# Patient Record
Sex: FEMALE | Race: WHITE | NOT HISPANIC OR LATINO | Employment: OTHER | URBAN - METROPOLITAN AREA
[De-identification: names, ages, dates, MRNs, and addresses within clinical notes are randomized per-mention and may not be internally consistent; named-entity substitution may affect disease eponyms.]

---

## 2018-12-16 ENCOUNTER — APPOINTMENT (EMERGENCY)
Dept: RADIOLOGY | Facility: HOSPITAL | Age: 73
DRG: 542 | End: 2018-12-16
Payer: MEDICARE

## 2018-12-16 ENCOUNTER — APPOINTMENT (EMERGENCY)
Dept: CT IMAGING | Facility: HOSPITAL | Age: 73
DRG: 542 | End: 2018-12-16
Payer: MEDICARE

## 2018-12-16 ENCOUNTER — HOSPITAL ENCOUNTER (INPATIENT)
Facility: HOSPITAL | Age: 73
LOS: 6 days | Discharge: NURSING, REHAB OR LONG-TERM CARE | DRG: 542 | End: 2018-12-23
Attending: EMERGENCY MEDICINE | Admitting: FAMILY MEDICINE
Payer: MEDICARE

## 2018-12-16 DIAGNOSIS — J44.1 COPD EXACERBATION (HCC): Primary | ICD-10-CM

## 2018-12-16 DIAGNOSIS — R26.2 AMBULATORY DYSFUNCTION: ICD-10-CM

## 2018-12-16 DIAGNOSIS — B37.0 CANDIDIASIS OF MOUTH: ICD-10-CM

## 2018-12-16 DIAGNOSIS — M54.50 ACUTE MIDLINE LOW BACK PAIN WITHOUT SCIATICA: ICD-10-CM

## 2018-12-16 DIAGNOSIS — Z72.0 TOBACCO ABUSE: ICD-10-CM

## 2018-12-16 PROBLEM — I10 ESSENTIAL HYPERTENSION: Status: ACTIVE | Noted: 2018-12-16

## 2018-12-16 PROBLEM — E87.6 HYPOKALEMIA: Status: ACTIVE | Noted: 2018-12-16

## 2018-12-16 LAB
ALBUMIN SERPL BCP-MCNC: 3.5 G/DL (ref 3.5–5)
ALP SERPL-CCNC: 120 U/L (ref 46–116)
ALT SERPL W P-5'-P-CCNC: 11 U/L (ref 12–78)
ANION GAP SERPL CALCULATED.3IONS-SCNC: 3 MMOL/L (ref 4–13)
AST SERPL W P-5'-P-CCNC: 10 U/L (ref 5–45)
ATRIAL RATE: 99 BPM
BASOPHILS # BLD AUTO: 0.01 THOUSANDS/ΜL (ref 0–0.1)
BASOPHILS NFR BLD AUTO: 0 % (ref 0–1)
BILIRUB SERPL-MCNC: 0.2 MG/DL (ref 0.2–1)
BUN SERPL-MCNC: 11 MG/DL (ref 5–25)
CALCIUM SERPL-MCNC: 9.5 MG/DL (ref 8.3–10.1)
CHLORIDE SERPL-SCNC: 96 MMOL/L (ref 100–108)
CO2 SERPL-SCNC: 42 MMOL/L (ref 21–32)
CREAT SERPL-MCNC: 0.6 MG/DL (ref 0.6–1.3)
EOSINOPHIL # BLD AUTO: 0.02 THOUSAND/ΜL (ref 0–0.61)
EOSINOPHIL NFR BLD AUTO: 0 % (ref 0–6)
ERYTHROCYTE [DISTWIDTH] IN BLOOD BY AUTOMATED COUNT: 12.8 % (ref 11.6–15.1)
GFR SERPL CREATININE-BSD FRML MDRD: 91 ML/MIN/1.73SQ M
GLUCOSE SERPL-MCNC: 101 MG/DL (ref 65–140)
HCT VFR BLD AUTO: 34.1 % (ref 34.8–46.1)
HGB BLD-MCNC: 11.1 G/DL (ref 11.5–15.4)
IMM GRANULOCYTES # BLD AUTO: 0.03 THOUSAND/UL (ref 0–0.2)
IMM GRANULOCYTES NFR BLD AUTO: 0 % (ref 0–2)
LACTATE SERPL-SCNC: 0.8 MMOL/L (ref 0.5–2)
LYMPHOCYTES # BLD AUTO: 4.39 THOUSANDS/ΜL (ref 0.6–4.47)
LYMPHOCYTES NFR BLD AUTO: 44 % (ref 14–44)
MAGNESIUM SERPL-MCNC: 1.7 MG/DL (ref 1.6–2.6)
MCH RBC QN AUTO: 31.3 PG (ref 26.8–34.3)
MCHC RBC AUTO-ENTMCNC: 32.6 G/DL (ref 31.4–37.4)
MCV RBC AUTO: 96 FL (ref 82–98)
MONOCYTES # BLD AUTO: 0.95 THOUSAND/ΜL (ref 0.17–1.22)
MONOCYTES NFR BLD AUTO: 10 % (ref 4–12)
NEUTROPHILS # BLD AUTO: 4.64 THOUSANDS/ΜL (ref 1.85–7.62)
NEUTS SEG NFR BLD AUTO: 46 % (ref 43–75)
NRBC BLD AUTO-RTO: 0 /100 WBCS
P AXIS: 53 DEGREES
PLATELET # BLD AUTO: 262 THOUSANDS/UL (ref 149–390)
PLATELET # BLD AUTO: 280 THOUSANDS/UL (ref 149–390)
PMV BLD AUTO: 8.8 FL (ref 8.9–12.7)
PMV BLD AUTO: 8.8 FL (ref 8.9–12.7)
POTASSIUM SERPL-SCNC: 3.1 MMOL/L (ref 3.5–5.3)
PR INTERVAL: 118 MS
PROT SERPL-MCNC: 7.3 G/DL (ref 6.4–8.2)
QRS AXIS: 94 DEGREES
QRSD INTERVAL: 86 MS
QT INTERVAL: 358 MS
QTC INTERVAL: 459 MS
RBC # BLD AUTO: 3.55 MILLION/UL (ref 3.81–5.12)
SODIUM SERPL-SCNC: 141 MMOL/L (ref 136–145)
T WAVE AXIS: 68 DEGREES
TROPONIN I SERPL-MCNC: 0.02 NG/ML
VENTRICULAR RATE: 99 BPM
WBC # BLD AUTO: 10.04 THOUSAND/UL (ref 4.31–10.16)

## 2018-12-16 PROCEDURE — 93010 ELECTROCARDIOGRAM REPORT: CPT | Performed by: INTERNAL MEDICINE

## 2018-12-16 PROCEDURE — 74177 CT ABD & PELVIS W/CONTRAST: CPT

## 2018-12-16 PROCEDURE — 36415 COLL VENOUS BLD VENIPUNCTURE: CPT | Performed by: PHYSICIAN ASSISTANT

## 2018-12-16 PROCEDURE — 80053 COMPREHEN METABOLIC PANEL: CPT | Performed by: PHYSICIAN ASSISTANT

## 2018-12-16 PROCEDURE — 94760 N-INVAS EAR/PLS OXIMETRY 1: CPT

## 2018-12-16 PROCEDURE — 83735 ASSAY OF MAGNESIUM: CPT | Performed by: NURSE PRACTITIONER

## 2018-12-16 PROCEDURE — 99220 PR INITIAL OBSERVATION CARE/DAY 70 MINUTES: CPT | Performed by: NURSE PRACTITIONER

## 2018-12-16 PROCEDURE — 96374 THER/PROPH/DIAG INJ IV PUSH: CPT

## 2018-12-16 PROCEDURE — 96375 TX/PRO/DX INJ NEW DRUG ADDON: CPT

## 2018-12-16 PROCEDURE — 84484 ASSAY OF TROPONIN QUANT: CPT | Performed by: PHYSICIAN ASSISTANT

## 2018-12-16 PROCEDURE — 71045 X-RAY EXAM CHEST 1 VIEW: CPT

## 2018-12-16 PROCEDURE — 94664 DEMO&/EVAL PT USE INHALER: CPT

## 2018-12-16 PROCEDURE — 85049 AUTOMATED PLATELET COUNT: CPT | Performed by: FAMILY MEDICINE

## 2018-12-16 PROCEDURE — 94640 AIRWAY INHALATION TREATMENT: CPT

## 2018-12-16 PROCEDURE — 83605 ASSAY OF LACTIC ACID: CPT | Performed by: PHYSICIAN ASSISTANT

## 2018-12-16 PROCEDURE — 85025 COMPLETE CBC W/AUTO DIFF WBC: CPT | Performed by: PHYSICIAN ASSISTANT

## 2018-12-16 PROCEDURE — 99285 EMERGENCY DEPT VISIT HI MDM: CPT

## 2018-12-16 PROCEDURE — 71275 CT ANGIOGRAPHY CHEST: CPT

## 2018-12-16 PROCEDURE — 84145 PROCALCITONIN (PCT): CPT | Performed by: FAMILY MEDICINE

## 2018-12-16 PROCEDURE — 93005 ELECTROCARDIOGRAM TRACING: CPT

## 2018-12-16 RX ORDER — LEVALBUTEROL 1.25 MG/.5ML
1.25 SOLUTION, CONCENTRATE RESPIRATORY (INHALATION)
Status: DISCONTINUED | OUTPATIENT
Start: 2018-12-16 | End: 2018-12-23 | Stop reason: HOSPADM

## 2018-12-16 RX ORDER — METHYLPREDNISOLONE SODIUM SUCCINATE 125 MG/2ML
125 INJECTION, POWDER, LYOPHILIZED, FOR SOLUTION INTRAMUSCULAR; INTRAVENOUS ONCE
Status: COMPLETED | OUTPATIENT
Start: 2018-12-16 | End: 2018-12-16

## 2018-12-16 RX ORDER — POTASSIUM CHLORIDE 14.9 MG/ML
20 INJECTION INTRAVENOUS
Status: COMPLETED | OUTPATIENT
Start: 2018-12-16 | End: 2018-12-16

## 2018-12-16 RX ORDER — CARVEDILOL 25 MG/1
25 TABLET ORAL 2 TIMES DAILY WITH MEALS
Status: ON HOLD | COMMUNITY
End: 2020-01-07 | Stop reason: SDUPTHER

## 2018-12-16 RX ORDER — ALBUTEROL SULFATE 90 UG/1
2 AEROSOL, METERED RESPIRATORY (INHALATION) EVERY 6 HOURS PRN
Status: ON HOLD | COMMUNITY
End: 2020-01-07 | Stop reason: SDUPTHER

## 2018-12-16 RX ORDER — NICOTINE 21 MG/24HR
1 PATCH, TRANSDERMAL 24 HOURS TRANSDERMAL DAILY
Status: DISCONTINUED | OUTPATIENT
Start: 2018-12-17 | End: 2018-12-23 | Stop reason: HOSPADM

## 2018-12-16 RX ORDER — CARVEDILOL 12.5 MG/1
25 TABLET ORAL 2 TIMES DAILY WITH MEALS
Status: DISCONTINUED | OUTPATIENT
Start: 2018-12-17 | End: 2018-12-23 | Stop reason: HOSPADM

## 2018-12-16 RX ORDER — PRASUGREL 10 MG/1
10 TABLET, FILM COATED ORAL
COMMUNITY

## 2018-12-16 RX ORDER — AMLODIPINE BESYLATE 5 MG/1
5 TABLET ORAL DAILY
Status: ON HOLD | COMMUNITY
End: 2020-01-07 | Stop reason: SDUPTHER

## 2018-12-16 RX ORDER — HEPARIN SODIUM 5000 [USP'U]/ML
5000 INJECTION, SOLUTION INTRAVENOUS; SUBCUTANEOUS EVERY 8 HOURS SCHEDULED
Status: DISCONTINUED | OUTPATIENT
Start: 2018-12-16 | End: 2018-12-23 | Stop reason: HOSPADM

## 2018-12-16 RX ORDER — ENALAPRIL MALEATE 20 MG/1
20 TABLET ORAL 2 TIMES DAILY
Status: ON HOLD | COMMUNITY
End: 2020-01-07 | Stop reason: SDUPTHER

## 2018-12-16 RX ORDER — SODIUM CHLORIDE FOR INHALATION 0.9 %
3 VIAL, NEBULIZER (ML) INHALATION
Status: DISCONTINUED | OUTPATIENT
Start: 2018-12-16 | End: 2018-12-23 | Stop reason: HOSPADM

## 2018-12-16 RX ORDER — AMLODIPINE BESYLATE 5 MG/1
5 TABLET ORAL DAILY
Status: DISCONTINUED | OUTPATIENT
Start: 2018-12-17 | End: 2018-12-23 | Stop reason: HOSPADM

## 2018-12-16 RX ORDER — LISINOPRIL 20 MG/1
20 TABLET ORAL DAILY
Status: DISCONTINUED | OUTPATIENT
Start: 2018-12-17 | End: 2018-12-23 | Stop reason: HOSPADM

## 2018-12-16 RX ORDER — HYDROCODONE BITARTRATE AND ACETAMINOPHEN 5; 325 MG/1; MG/1
1 TABLET ORAL EVERY 6 HOURS PRN
COMMUNITY
End: 2018-12-23 | Stop reason: HOSPADM

## 2018-12-16 RX ORDER — IPRATROPIUM BROMIDE AND ALBUTEROL SULFATE 2.5; .5 MG/3ML; MG/3ML
3 SOLUTION RESPIRATORY (INHALATION) ONCE
Status: COMPLETED | OUTPATIENT
Start: 2018-12-16 | End: 2018-12-16

## 2018-12-16 RX ORDER — PREDNISONE 20 MG/1
40 TABLET ORAL DAILY
Status: DISCONTINUED | OUTPATIENT
Start: 2018-12-17 | End: 2018-12-20

## 2018-12-16 RX ORDER — ASCORBIC ACID 500 MG
500 TABLET ORAL DAILY
COMMUNITY

## 2018-12-16 RX ORDER — POTASSIUM CHLORIDE 29.8 MG/ML
40 INJECTION INTRAVENOUS ONCE
Status: DISCONTINUED | OUTPATIENT
Start: 2018-12-16 | End: 2018-12-16 | Stop reason: SDUPTHER

## 2018-12-16 RX ORDER — PREDNISONE 10 MG/1
10 TABLET ORAL DAILY
COMMUNITY
End: 2018-12-23 | Stop reason: HOSPADM

## 2018-12-16 RX ORDER — VENLAFAXINE HYDROCHLORIDE 150 MG/1
150 CAPSULE, EXTENDED RELEASE ORAL DAILY
COMMUNITY
End: 2020-01-08 | Stop reason: HOSPADM

## 2018-12-16 RX ORDER — HEPARIN SODIUM 5000 [USP'U]/ML
5000 INJECTION, SOLUTION INTRAVENOUS; SUBCUTANEOUS EVERY 8 HOURS SCHEDULED
Status: DISCONTINUED | OUTPATIENT
Start: 2018-12-16 | End: 2018-12-16 | Stop reason: SDUPTHER

## 2018-12-16 RX ORDER — HYDROCODONE BITARTRATE AND ACETAMINOPHEN 5; 325 MG/1; MG/1
1 TABLET ORAL EVERY 6 HOURS PRN
Status: DISCONTINUED | OUTPATIENT
Start: 2018-12-16 | End: 2018-12-21

## 2018-12-16 RX ORDER — BUDESONIDE AND FORMOTEROL FUMARATE DIHYDRATE 160; 4.5 UG/1; UG/1
2 AEROSOL RESPIRATORY (INHALATION) 2 TIMES DAILY
Status: DISCONTINUED | OUTPATIENT
Start: 2018-12-16 | End: 2018-12-23 | Stop reason: HOSPADM

## 2018-12-16 RX ORDER — ALBUTEROL SULFATE 90 UG/1
2 AEROSOL, METERED RESPIRATORY (INHALATION) EVERY 4 HOURS PRN
Status: DISCONTINUED | OUTPATIENT
Start: 2018-12-16 | End: 2018-12-23 | Stop reason: HOSPADM

## 2018-12-16 RX ORDER — VENLAFAXINE HYDROCHLORIDE 150 MG/1
150 CAPSULE, EXTENDED RELEASE ORAL DAILY
Status: DISCONTINUED | OUTPATIENT
Start: 2018-12-17 | End: 2018-12-23 | Stop reason: HOSPADM

## 2018-12-16 RX ORDER — PRASUGREL 10 MG/1
10 TABLET, FILM COATED ORAL DAILY
Status: DISCONTINUED | OUTPATIENT
Start: 2018-12-17 | End: 2018-12-23 | Stop reason: HOSPADM

## 2018-12-16 RX ORDER — BUDESONIDE AND FORMOTEROL FUMARATE DIHYDRATE 160; 4.5 UG/1; UG/1
2 AEROSOL RESPIRATORY (INHALATION) 2 TIMES DAILY
Status: ON HOLD | COMMUNITY
End: 2020-01-07 | Stop reason: SDUPTHER

## 2018-12-16 RX ADMIN — POTASSIUM CHLORIDE 20 MEQ: 200 INJECTION, SOLUTION INTRAVENOUS at 21:46

## 2018-12-16 RX ADMIN — IPRATROPIUM BROMIDE 0.5 MG: 0.5 SOLUTION RESPIRATORY (INHALATION) at 20:53

## 2018-12-16 RX ADMIN — IOHEXOL 100 ML: 350 INJECTION, SOLUTION INTRAVENOUS at 16:08

## 2018-12-16 RX ADMIN — METHYLPREDNISOLONE SODIUM SUCCINATE 125 MG: 125 INJECTION, POWDER, FOR SOLUTION INTRAMUSCULAR; INTRAVENOUS at 13:15

## 2018-12-16 RX ADMIN — HEPARIN SODIUM 5000 UNITS: 5000 INJECTION, SOLUTION INTRAVENOUS; SUBCUTANEOUS at 22:08

## 2018-12-16 RX ADMIN — HYDROCODONE BITARTRATE AND ACETAMINOPHEN 1 TABLET: 5; 325 TABLET ORAL at 19:53

## 2018-12-16 RX ADMIN — IPRATROPIUM BROMIDE AND ALBUTEROL SULFATE 3 ML: 2.5; .5 SOLUTION RESPIRATORY (INHALATION) at 13:14

## 2018-12-16 RX ADMIN — LEVALBUTEROL HYDROCHLORIDE 1.25 MG: 1.25 SOLUTION, CONCENTRATE RESPIRATORY (INHALATION) at 20:53

## 2018-12-16 RX ADMIN — MORPHINE SULFATE 2 MG: 2 INJECTION, SOLUTION INTRAMUSCULAR; INTRAVENOUS at 14:42

## 2018-12-16 RX ADMIN — BUDESONIDE AND FORMOTEROL FUMARATE DIHYDRATE 2 PUFF: 160; 4.5 AEROSOL RESPIRATORY (INHALATION) at 20:21

## 2018-12-16 RX ADMIN — POTASSIUM CHLORIDE 20 MEQ: 200 INJECTION, SOLUTION INTRAVENOUS at 19:59

## 2018-12-16 RX ADMIN — NYSTATIN 500000 UNITS: 500000 SUSPENSION ORAL at 22:08

## 2018-12-16 NOTE — H&P
H&P- Earlyne Diss 1945, 68 y o  female MRN: [id number]    Unit/Bed#: ED 27 Encounter: [id number]    Primary Care Provider: Quin Jordan MD   Date and time admitted to hospital: 12/16/2018 12:40 PM        * COPD exacerbation ([facility] [address] )   Assessment & Plan    CT chest no acute abnormalities  -IV Steroid:  Transitioned to by mouth tomorrow  -respiratory protocol  -continue nasal cannula patient is on home O2 she states at 5 L     Acute low back pain   Assessment & Plan    CT Age indeterminate severe compression fracture deformity of L1 with associated slight retropulsion of the posterior cortex into the spinal canal resulting in mild canal narrowing  There are also age indeterminate compression fracture deformities of the  superior endplate of W47, L3 and L4  Lidoderm patch, Aqua K  -Acute on chromic  -Continue Pain medication   -PT/OT      Essential hypertension   Assessment & Plan    Elevated 180/90 mmhg  Denies any headaches or blurry vision at this time  Patient did not take her medications this morning, will reorder home medications and continue to monitor blood pressure       Hypokalemia   Assessment & Plan    Replace K  BMP am     Ambulatory dysfunction   Assessment & Plan    PT/OT evaluation   Patient uses a walker at baseline  Continue Pain medication      Tobacco abuse   Assessment & Plan    · Patient reports she smokes 6-8 cigarettes daily  · Counseled on cessation  · Nicotine patch       VTE Prophylaxis: Heparin  / sequential compression device   Code Status:   POLST: There is no POLST form on file for this patient (pre-hospital)  Discussion with family:     Anticipated Length of Stay:  Patient will be admitted on an Observation basis with an anticipated length of stay of  <2 midnights  Justification for Hospital Stay:  Patient will require IV steroids, physical therapy consultation and Respiratory protocol    Total Time for Visit, including Counseling / Coordination of Care: 45 minutes  Greater than 50% of this total time spent on direct patient counseling and coordination of care  Chief Complaint:   Shortness of breath and back pain    History of Present Illness:    Raheel Hylton is a 68 y o  female who presents with increased shortness of breath and lower back pain  Patient continues to smoke at home and uses 5 L oxygen home baseline  She presents to the ED with increased shortness of breath that she reports started worsening this morning  She is also complaining of low back pain that she has been experiencing for approximately 1 month she recently had some type of spine surgery  She denies any chest pain or chest tightness reports dyspnea and shortness of breath  She denies any headaches lightheadedness dizziness  Patient's story is very confusing  Patient provides multiple answers when asked what caused charge to the ED  Patient states that she can't walk and then reports that she can walk with a walker  Review of Systems:    Review of Systems   Constitutional: Negative for activity change, appetite change, chills, diaphoresis and fatigue  HENT: Negative for congestion, dental problem, drooling and ear discharge  Eyes: Negative for pain, discharge, redness and itching  Respiratory: Positive for cough and shortness of breath  Negative for apnea, choking and chest tightness  Cardiovascular: Negative for chest pain, palpitations and leg swelling  Gastrointestinal: Negative for abdominal distention, abdominal pain, anal bleeding and blood in stool  Genitourinary: Negative for difficulty urinating, dyspareunia, dysuria and enuresis  Musculoskeletal: Positive for back pain and gait problem  Negative for joint swelling, neck pain and neck stiffness  Skin: Negative for color change  Neurological: Positive for weakness and light-headedness  Negative for dizziness, facial asymmetry and headaches  Psychiatric/Behavioral: Negative for agitation         Past Medical and Surgical History:     Past Medical History:   Diagnosis Date    COPD (chronic obstructive pulmonary disease) ([facility] [address] )     Hyperlipidemia     Hypertension     Renal disorder     benign kidney tumor       Past Surgical History:   Procedure Laterality Date    APPENDECTOMY         Meds/Allergies:    Prior to Admission medications    Medication Sig Start Date End Date Taking? Authorizing Provider   albuterol (VENTOLIN HFA) 90 mcg/act inhaler Inhale 2 puffs every 6 (six) hours as needed for wheezing   Yes Historical Provider, MD   amLODIPine (NORVASC) 5 mg tablet Take 5 mg by mouth daily   Yes Historical Provider, MD   ascorbic acid (VITAMIN C) 500 mg tablet Take 500 mg by mouth daily   Yes Historical Provider, MD   budesonide-formoterol (SYMBICORT) 160-4 5 mcg/act inhaler Inhale 2 puffs 2 (two) times a day Rinse mouth after use  Yes Historical Provider, MD   Calcium-Magnesium-Vitamin D (CALCIUM 500 PO) Take 1,000 mg by mouth daily   Yes Historical Provider, MD   carvedilol (COREG) 25 mg tablet Take 25 mg by mouth 2 (two) times a day with meals   Yes Historical Provider, MD   enalapril (VASOTEC) 20 mg tablet Take 20 mg by mouth 2 (two) times a day   Yes Historical Provider, MD   HYDROcodone-acetaminophen (NORCO) 5-325 mg per tablet Take 1 tablet by mouth every 6 (six) hours as needed for pain   Yes Historical Provider, MD   Potassium 99 MG TABS Take 99 mg by mouth   Yes Historical Provider, MD   prasugrel (EFFIENT) tablet Take 10 mg by mouth   Yes Historical Provider, MD   predniSONE 10 mg tablet Take 10 mg by mouth daily   Yes Historical Provider, MD   traMADol-acetaminophen (ULTRACET) 37 5-325 mg per tablet Take 1 tablet by mouth 2 (two) times a day as needed for moderate pain   Yes Historical Provider, MD   venlafaxine (EFFEXOR-XR) 150 mg 24 hr capsule Take 150 mg by mouth daily   Yes Historical Provider, MD     I have reviewed home medications with patient personally  Allergies:    Allergies   Allergen Reactions  Sulfa Antibiotics Anaphylaxis       Social History:     Marital Status:    Occupation:  Retired  Patient Pre-hospital Living Situation:  Private residence  Patient Pre-hospital Level of Mobility:  Walker  Patient Pre-hospital Diet Restrictions:  None  Substance Use History:   History   Alcohol Use No     History   Smoking Status    Current Every Day Smoker   Smokeless Tobacco    Never Used     History   Drug Use No       Family History:    No family history on file  Physical Exam:     Vitals:   Blood Pressure: (!) 199/91 (12/16/18 1709)  Pulse: (!) 117 (12/16/18 1840)  Temperature: 98 4 °F (36 9 °C) (12/16/18 1316)  Temp Source: Oral (12/16/18 1316)  Respirations: (!) 26 (12/16/18 1840)  Weight - Scale: 61 8 kg (136 lb 3 9 oz) (12/16/18 1250)  SpO2: (!) 86 % (SLIM at bedside) (12/16/18 1840)    Physical Exam   Constitutional: She is oriented to person, place, and time  She appears well-developed  She appears cachectic  She has a sickly appearance  Nasal cannula in place  HENT:   Head: Normocephalic  Eyes: Pupils are equal, round, and reactive to light  Neck: Normal range of motion  Cardiovascular: Normal rate  Pulmonary/Chest: Effort normal  Tachypnea noted  She has wheezes  She has rhonchi  Abdominal: Soft  Bowel sounds are normal    Musculoskeletal: Normal range of motion  Neurological: She is alert and oriented to person, place, and time  Skin: Skin is warm and dry  Psychiatric: She has a normal mood and affect  Her behavior is normal    Nursing note and vitals reviewed  Additional Data:     Lab Results: I have personally reviewed pertinent reports          Results from last 7 days  Lab Units 12/16/18  1307   WBC Thousand/uL 10 04   HEMOGLOBIN g/dL 11 1*   HEMATOCRIT % 34 1*   PLATELETS Thousands/uL 262   NEUTROS PCT % 46   LYMPHS PCT % 44   MONOS PCT % 10   EOS PCT % 0       Results from last 7 days  Lab Units 12/16/18  1307   SODIUM mmol/L 141   POTASSIUM mmol/L 3 1* CHLORIDE mmol/L 96*   CO2 mmol/L 42*   BUN mg/dL 11   CREATININE mg/dL 0 60   ANION GAP mmol/L 3*   CALCIUM mg/dL 9 5   ALBUMIN g/dL 3 5   TOTAL BILIRUBIN mg/dL 0 20   ALK PHOS U/L 120*   ALT U/L 11*   AST U/L 10   GLUCOSE RANDOM mg/dL 101                   Results from last 7 days  Lab Units 12/16/18  1307   LACTIC ACID mmol/L 0 8       Imaging: I have personally reviewed pertinent reports  CT recon only thoracolumbar   Final Result by Raimundo Morin MD (12/16 1728)      Age indeterminate severe compression fracture deformity of L1 with associated slight retropulsion of the posterior cortex into the spinal canal resulting in mild canal narrowing  There are also age indeterminate compression fracture deformities of the    superior endplate of B18, L3 and L4  Workstation performed: [id number]         PE Study with CT abdomen & pelvis with contrast   Final Result by Raimundo Morin MD (12/16 9269)      1  No evidence of pulmonary embolism  2   Well-circumscribed nodule in the left adrenal gland  This may represent a small adrenal adenoma and correlation with older studies to determine if this has been stable is recommended  If older studies are not available, nonemergent CT adrenal    protocol can be performed for further evaluation  3   Diverticulosis coli  4   Multiple age-indeterminate compression deformities in the thoracolumbar spine as above  Workstation performed: [id number]         XR chest portable    (Results Pending)       EKG, Pathology, and Other Studies Reviewed on Admission:   · EKG:  Not available    Allscripts / Epic Records Reviewed: Yes     ** Please Note: This note has been constructed using a voice recognition system   **

## 2018-12-16 NOTE — ASSESSMENT & PLAN NOTE
CT Age indeterminate severe compression fracture deformity of L1 with associated slight retropulsion of the posterior cortex into the spinal canal resulting in mild canal narrowing  There are also age indeterminate compression fracture deformities of the  superior endplate of X53, L3 and L4      Lidoderm patch, Aqua K  -Acute on chromic  -Continue Pain medication   -PT/OT

## 2018-12-16 NOTE — ASSESSMENT & PLAN NOTE
CT chest no acute abnormalities  -IV Steroid:  Transitioned to by mouth tomorrow  -respiratory protocol  -continue nasal cannula patient is on home O2 she states at 5 L

## 2018-12-16 NOTE — ED CARE HANDOFF
Emergency Department Sign Out Note        Sign out and transfer of care from [facility]  See Separate Emergency Department note  The patient, Carla Turner, was evaluated by the previous provider for sob back pain  Workup Completed:  labs    ED Course / Workup Pending (followup):  PE study                             Procedures  MDM  Number of Diagnoses or Management Options  Risk of Complications, Morbidity, and/or Mortality  Presenting problems: moderate  Management options: low  General comments: 69-year-old female with back pain and shortness of breath  Shortness of breath consistent with COPD exacerbation  CT shows no evidence of PE or pneumonia  Regards to her back pain she reports that she is unable walk because of this pain it is too severe  She also reports that she does not have any oxygen available at home  For these reasons she will be admitted, ambulatory dysfunction, COPD exacerbation  Patient Progress  Patient progress: stable    CritCare Time      Disposition  Final diagnoses:   COPD exacerbation ([facility] [address] )   Ambulatory dysfunction     Time reflects when diagnosis was documented in both MDM as applicable and the Disposition within this note     Time User Action Codes Description Comment    12/16/2018  5:53 PM Alexi PHILIP Add [J44 1] COPD exacerbation ([facility] [address] )     12/16/2018  5:53 PM Ranjeet Smith Add [R26 2] Ambulatory dysfunction       ED Disposition     ED Disposition Condition Comment    Admit  Case was discussed with Dr Tremaine Barcenas and the patient's admission status was agreed to be Admission Status: observation status to the service of Dr Tremaine Barcenas  Follow-up Information    None       Patient's Medications   Discharge Prescriptions    No medications on file     No discharge procedures on file         ED Provider  Electronically Signed by     Ted Johnson PA-C  12/16/18 4115

## 2018-12-16 NOTE — ASSESSMENT & PLAN NOTE
Elevated 180/90 mmhg  Denies any headaches or blurry vision at this time  Patient did not take her medications this morning, will reorder home medications and continue to monitor blood pressure

## 2018-12-17 LAB
ANION GAP SERPL CALCULATED.3IONS-SCNC: 7 MMOL/L (ref 4–13)
BUN SERPL-MCNC: 13 MG/DL (ref 5–25)
CALCIUM SERPL-MCNC: 9.7 MG/DL (ref 8.3–10.1)
CHLORIDE SERPL-SCNC: 101 MMOL/L (ref 100–108)
CO2 SERPL-SCNC: 35 MMOL/L (ref 21–32)
CREAT SERPL-MCNC: 0.67 MG/DL (ref 0.6–1.3)
GFR SERPL CREATININE-BSD FRML MDRD: 88 ML/MIN/1.73SQ M
GLUCOSE SERPL-MCNC: 170 MG/DL (ref 65–140)
POTASSIUM SERPL-SCNC: 3.5 MMOL/L (ref 3.5–5.3)
PROCALCITONIN SERPL-MCNC: <0.05 NG/ML
SODIUM SERPL-SCNC: 143 MMOL/L (ref 136–145)

## 2018-12-17 PROCEDURE — 97167 OT EVAL HIGH COMPLEX 60 MIN: CPT

## 2018-12-17 PROCEDURE — 80048 BASIC METABOLIC PNL TOTAL CA: CPT | Performed by: INTERNAL MEDICINE

## 2018-12-17 PROCEDURE — G8988 SELF CARE GOAL STATUS: HCPCS

## 2018-12-17 PROCEDURE — 99226 PR SBSQ OBSERVATION CARE/DAY 35 MINUTES: CPT | Performed by: GENERAL PRACTICE

## 2018-12-17 PROCEDURE — 94760 N-INVAS EAR/PLS OXIMETRY 1: CPT

## 2018-12-17 PROCEDURE — 94640 AIRWAY INHALATION TREATMENT: CPT

## 2018-12-17 PROCEDURE — G8987 SELF CARE CURRENT STATUS: HCPCS

## 2018-12-17 RX ORDER — HYDROMORPHONE HCL/PF 1 MG/ML
0.5 SYRINGE (ML) INJECTION EVERY 4 HOURS PRN
Status: DISCONTINUED | OUTPATIENT
Start: 2018-12-17 | End: 2018-12-21

## 2018-12-17 RX ORDER — METHOCARBAMOL 500 MG/1
500 TABLET, FILM COATED ORAL EVERY 6 HOURS PRN
Status: DISCONTINUED | OUTPATIENT
Start: 2018-12-17 | End: 2018-12-23 | Stop reason: HOSPADM

## 2018-12-17 RX ADMIN — LEVALBUTEROL HYDROCHLORIDE 1.25 MG: 1.25 SOLUTION, CONCENTRATE RESPIRATORY (INHALATION) at 14:18

## 2018-12-17 RX ADMIN — HYDROCODONE BITARTRATE AND ACETAMINOPHEN 1 TABLET: 5; 325 TABLET ORAL at 04:17

## 2018-12-17 RX ADMIN — CARVEDILOL 25 MG: 12.5 TABLET, FILM COATED ORAL at 08:04

## 2018-12-17 RX ADMIN — PRASUGREL HYDROCHLORIDE 10 MG: 10 TABLET, FILM COATED ORAL at 08:04

## 2018-12-17 RX ADMIN — AMLODIPINE BESYLATE 5 MG: 5 TABLET ORAL at 08:04

## 2018-12-17 RX ADMIN — NYSTATIN 500000 UNITS: 500000 SUSPENSION ORAL at 08:05

## 2018-12-17 RX ADMIN — ISODIUM CHLORIDE 3 ML: 0.03 SOLUTION RESPIRATORY (INHALATION) at 14:18

## 2018-12-17 RX ADMIN — HYDROCODONE BITARTRATE AND ACETAMINOPHEN 1 TABLET: 5; 325 TABLET ORAL at 15:34

## 2018-12-17 RX ADMIN — ISODIUM CHLORIDE 3 ML: 0.03 SOLUTION RESPIRATORY (INHALATION) at 09:46

## 2018-12-17 RX ADMIN — HEPARIN SODIUM 5000 UNITS: 5000 INJECTION, SOLUTION INTRAVENOUS; SUBCUTANEOUS at 13:10

## 2018-12-17 RX ADMIN — NYSTATIN 500000 UNITS: 500000 SUSPENSION ORAL at 17:29

## 2018-12-17 RX ADMIN — METHOCARBAMOL 500 MG: 500 TABLET ORAL at 23:20

## 2018-12-17 RX ADMIN — BUDESONIDE AND FORMOTEROL FUMARATE DIHYDRATE 2 PUFF: 160; 4.5 AEROSOL RESPIRATORY (INHALATION) at 08:05

## 2018-12-17 RX ADMIN — BUDESONIDE AND FORMOTEROL FUMARATE DIHYDRATE 2 PUFF: 160; 4.5 AEROSOL RESPIRATORY (INHALATION) at 17:28

## 2018-12-17 RX ADMIN — NYSTATIN 500000 UNITS: 500000 SUSPENSION ORAL at 13:03

## 2018-12-17 RX ADMIN — HYDROCODONE BITARTRATE AND ACETAMINOPHEN 1 TABLET: 5; 325 TABLET ORAL at 22:48

## 2018-12-17 RX ADMIN — NYSTATIN 500000 UNITS: 500000 SUSPENSION ORAL at 21:17

## 2018-12-17 RX ADMIN — CARVEDILOL 25 MG: 12.5 TABLET, FILM COATED ORAL at 16:12

## 2018-12-17 RX ADMIN — HEPARIN SODIUM 5000 UNITS: 5000 INJECTION, SOLUTION INTRAVENOUS; SUBCUTANEOUS at 21:17

## 2018-12-17 RX ADMIN — NICOTINE 1 PATCH: 14 PATCH TRANSDERMAL at 08:04

## 2018-12-17 RX ADMIN — LEVALBUTEROL HYDROCHLORIDE 1.25 MG: 1.25 SOLUTION, CONCENTRATE RESPIRATORY (INHALATION) at 21:01

## 2018-12-17 RX ADMIN — LEVALBUTEROL HYDROCHLORIDE 1.25 MG: 1.25 SOLUTION, CONCENTRATE RESPIRATORY (INHALATION) at 09:46

## 2018-12-17 RX ADMIN — LISINOPRIL 20 MG: 20 TABLET ORAL at 08:04

## 2018-12-17 RX ADMIN — ISODIUM CHLORIDE 3 ML: 0.03 SOLUTION RESPIRATORY (INHALATION) at 21:01

## 2018-12-17 RX ADMIN — VENLAFAXINE HYDROCHLORIDE 150 MG: 150 CAPSULE, EXTENDED RELEASE ORAL at 08:04

## 2018-12-17 RX ADMIN — HEPARIN SODIUM 5000 UNITS: 5000 INJECTION, SOLUTION INTRAVENOUS; SUBCUTANEOUS at 05:49

## 2018-12-17 RX ADMIN — PREDNISONE 40 MG: 20 TABLET ORAL at 08:04

## 2018-12-17 NOTE — PHYSICIAN ADVISOR
Current patient class: Observation  The patient is currently on Hospital Day: 2 at [address]      The patient was admitted to the hospital at N/A on N/A for the following diagnosis:  Back pain [M54 9]  SOB (shortness of breath) [R06 02]  COPD exacerbation (HCC) [J44 1]  Ambulatory dysfunction [R26 2]       There is documentation in the medical record of an expected length of stay of at least 2 midnights  The patient is therefore expected to satisfy the 2 midnight benchmark and given the 2 midnight presumption is appropriate for INPATIENT ADMISSION  Given this expectation of a satisfying stay, CMS instructs us that the patient is most often appropriate for inpatient admission under part A provided medical necessity is documented in the chart  After review of the relevant documentation, labs, vital signs and test results, the patient is appropriate for INPATIENT ADMISSION  Admission to the hospital as an inpatient is a complex decision making process which requires the practitioner to consider the patients presenting complaint, history and physical examination and all relevant testing  With this in mind, in this case, the patient was deemed appropriate for INPATIENT ADMISSION  After review of the documentation and testing available at the time of the admission I concur with this clinical determination of medical necessity  Rationale is as follows: The patient is a 68 yrs old Female who presented to the ED at 12/16/2018 12:40 PM with a chief complaint of Shortness of Breath (patient presents with c/o increased SOB over the last week  Patient with hx of COPD and chronically uses 5L O2  patient tachypnic with anxiety during triage ) and Back Pain     The patient presented with shortness of breath and back pain  The patient continues to smoke and utilizes 5 L of oxygen at home  Patient is being admitted secondary to acute COPD exacerbation    The plan of care includes intravenous steroids, respiratory protocol, nasal cannula  This patient is appropriate for inpatient admission continued hospitalization is necessary to ensure stabilization her clinical status  The patient's potassium will be replaced as well as blood pressure monitored    Patient will be given pain control for acute low back pain as imaging showed age-indeterminate compression fractures full all    The patients vitals on arrival were ED Triage Vitals   Temperature Pulse Respirations Blood Pressure SpO2   12/16/18 1316 12/16/18 1247 12/16/18 1247 12/16/18 1247 12/16/18 1247   98 4 °F (36 9 °C) 99 21 (!) 184/92 95 %      Temp Source Heart Rate Source Patient Position - Orthostatic VS BP Location FiO2 (%)   12/16/18 1316 12/16/18 1400 12/16/18 1400 12/16/18 1400 --   Oral Monitor Lying Left arm       Pain Score       12/16/18 1709       Worst Possible Pain           Past Medical History:   Diagnosis Date    COPD [facility]chronic obstructive pulmonary disease) ([facility] [address] )     Hyperlipidemia     Hypertension     Renal disorder     benign kidney tumor     Past Surgical History:   Procedure Laterality Date    APPENDECTOMY      SPINAL FUSION      L7 L9           Consults have been placed to:   IP CONSULT TO CASE MANAGEMENT    Vitals:    12/17/18 0722 12/17/18 0948 12/17/18 1418 12/17/18 1522   BP: (!) 186/86   161/78   BP Location: Left arm   Left arm   Pulse: 92   97   Resp: 20   18   Temp: 97 8 °F (36 6 °C)   97 6 °F (36 4 °C)   TempSrc: Oral   Oral   SpO2: 96% 94% 94% 97%   Weight:       Height:           Most recent labs:    Recent Labs      12/16/18   1307  12/16/18   1605  12/16/18 2015 12/17/18   1024   WBC  10 04   --    --    --    HGB  11 1*   --    --    --    HCT  34 1*   --    --    --    PLT  262   --   280   --    K  3 1*   --    --   3 5   CALCIUM  9 5   --    --   9 7   BUN  11   --    --   13   CREATININE  0 60   --    --   0 67   TROPONINI   --   0 02   --    --    AST  10   --    --    --    ALT  11*   -- --    --    ALKPHOS  120*   --    --    --        Scheduled Meds:  Current Facility-Administered Medications:  albuterol 2 puff Inhalation Q4H PRN Maty Booth MD   amLODIPine 5 mg Oral Daily Rebekah Nunez MD   budesonide-formoterol 2 puff Inhalation BID Rebekah Nunez MD   carvedilol 25 mg Oral BID With Meals Rebekah Nunez MD   heparin (porcine) 5,000 Units Subcutaneous Q8H [address] Rebekah Nunez MD   HYDROcodone-acetaminophen 1 tablet Oral Q6H PRN Rebekah Nunez MD   levalbuterol 1 25 mg Nebulization TID Rebekah Nunez MD   And       sodium chloride 3 mL Nebulization TID Rebekah Nunez MD   lisinopril 20 mg Oral Daily Rebekah Nunez MD   nicotine 1 patch Transdermal Daily Rebekah Nunez MD   nystatin 500,000 Units Swish & Swallow 4x Daily Rebekah Nunez MD   prasugrel 10 mg Oral Daily Rebekah Nunez MD   predniSONE 40 mg Oral Daily Rebekah Nunez MD   venlafaxine 150 mg Oral Daily Rebekah Nunez MD     Continuous Infusions:   PRN Meds:   albuterol    HYDROcodone-acetaminophen    Surgical procedures (if appropriate):

## 2018-12-17 NOTE — ASSESSMENT & PLAN NOTE
CT chest no acute abnormalities  -IV Steroid  -respiratory protocol  -continue nasal cannula patient is on home O2 she states at 5 L

## 2018-12-17 NOTE — PLAN OF CARE
DISCHARGE PLANNING     Discharge to home or other facility with appropriate resources Progressing        Knowledge Deficit     Patient/family/caregiver demonstrates understanding of disease process, treatment plan, medications, and discharge instructions Progressing        METABOLIC, FLUID AND ELECTROLYTES - ADULT     Fluid balance maintained Progressing        Nutrition/Hydration-ADULT     Nutrient/Hydration intake appropriate for improving, restoring or maintaining nutritional needs Progressing        PAIN - ADULT     Verbalizes/displays adequate comfort level or baseline comfort level Progressing        Potential for Falls     Patient will remain free of falls Progressing        Prexisting or High Potential for Compromised Skin Integrity     Skin integrity is maintained or improved Progressing        RESPIRATORY - ADULT     Achieves optimal ventilation and oxygenation Progressing        SAFETY ADULT     Patient will remain free of falls Progressing

## 2018-12-17 NOTE — PLAN OF CARE
Problem: OCCUPATIONAL THERAPY ADULT  Goal: Performs self-care activities at highest level of function for planned discharge setting  See evaluation for individualized goals  Treatment Interventions: ADL retraining, Functional transfer training, UE strengthening/ROM, Endurance training, Patient/family training, Equipment evaluation/education, Fine motor coordination activities, Compensatory technique education, Continued evaluation, Energy conservation, Cardiac education          See flowsheet documentation for full assessment, interventions and recommendations  Limitation: Decreased ADL status, Decreased UE strength, Decreased endurance, Decreased fine motor control, Decreased self-care trans, Decreased high-level ADLs  Prognosis: Good  Assessment: Patient is a 68 y o  female seen for OT evaluation s/p admit to [facility]  on 12/16/2018 w/COPD exacerbation ([facility] [address] )  Patient presented to ED with  increased shortness of breath and lower back pain  Commorbidities affecting patient's functional performance at time of assessment include: HTN, hypokalemia, ambulatory dysfunction, tobacco abuse  Orders placed for OT evaluation and treatment  Performed at least two patient identifiers during session including name and wristband  Prior to admission, Patient reporting independent with ADLs/ IADLs up to 3 weeks ago when the back pain excacerbated to the point of requiring assistance in all areas  Patient ambulates with RW,  drives and manages her affairs independently  Personal factors affecting patient at time of initial evaluation include: limited caregiver support, steps to enter, decreased initiation and engagement, difficulty performing ADLs and difficulty performing IADLs   Upon evaluation, patient requires moderate assist for UB ADLs, maximal   Occupational performance is affected by the following deficits: irritability, decreased functional use of BUEs, decreased muscle strength, degenerative arthritic joint changes, impaired gross motor coordination, dynamic sit/ stand balance deficit with poor standing tolerance time for self care and functional mobility, decreased activity tolerance, decreased safety awareness and postural control and postural alignment deficit, requiring external assistance to complete transitional movements  Therapist completed  extensive additional review of medical records and additional review of physical, cognitive or psychosocial history, clinical examination identifying 5 or more performance deficits, clinical decision making of a high complexity , consistent with a high complexity level evaluation  Patient to benefit from continued Occupational Therapy treatment while in the hospital to address deficits as defined above and maximize level of functional independence with ADLs and functional mobility  Occupational Performance areas to address include: grooming , bathing/ shower, dressing, toilet hygiene, transfer to all surfaces, functional mobility, health maintenance, medication routine/ management, IADLS: Household maintenance, IADLs: safety procedures, IADLs: meal prep/ clean up, Leisure Participation and Social participation  From OT standpoint, recommendation at time of d/c would be Short Term Rehab         OT Discharge Recommendation: Short Term Rehab

## 2018-12-17 NOTE — UTILIZATION REVIEW
WAS OBSERVATION 12/16/18 @1752 CONVERTED TO INPATIENT ADMISSION 12/17/18 @1624 DUE TO>2MN REQUIRED TO CARE FOR PATIENT WITH INCREASED SHORTNESS OF BREATH AND COPD EXAC WITH IV STEROIDS NEEDED; AND BACK PAIN   12/17/18 1624  Inpatient Admission Once     Transfer Service: Hospitalist       Question Answer Comment   Admitting Physician Angie Forde A    Level of Care Med Surg    Estimated length of stay More than 2 Midnights    Certification I certify that inpatient services are medically necessary for this patient for a duration of greater than two midnights  See H&P and MD Progress Notes for additional information about the patient's course of treatment          64/54/98 5031     Certification Statement: The patient will continue to require additional inpatient hospital stay due to back pain

## 2018-12-17 NOTE — ED PROVIDER NOTES
History  Chief Complaint   Patient presents with    Shortness of Breath     patient presents with c/o increased SOB over the last week  Patient with hx of COPD and chronically uses 5L O2  patient tachypnic with anxiety during triage     Back Pain     Carla Turner is a 68 y o  female w PMH COPD, HTN, HLD, renal disorder who presents for evaluation of back pain  Pt is here w her family members  She reports severe back pain  Family reports this has been going on "forever" and describe this as months  Apparently had a kyphoplasty in september  She has had pain persisently since  She has copd and wears 5L at home but is more sob now as well  No prior pe / dvt  No recent trauma / travel  No malignancy  No leg swelling or pain  She has no f/c  No worsening coughing from baseline  No cp  Prior to Admission Medications   Prescriptions Last Dose Informant Patient Reported? Taking? Calcium-Magnesium-Vitamin D (CALCIUM 500 PO)   Yes Yes   Sig: Take 1,000 mg by mouth daily   HYDROcodone-acetaminophen (NORCO) 5-325 mg per tablet   Yes Yes   Sig: Take 1 tablet by mouth every 6 (six) hours as needed for pain   Potassium 99 MG TABS   Yes Yes   Sig: Take 99 mg by mouth   albuterol (VENTOLIN HFA) 90 mcg/act inhaler   Yes Yes   Sig: Inhale 2 puffs every 6 (six) hours as needed for wheezing   amLODIPine (NORVASC) 5 mg tablet   Yes Yes   Sig: Take 5 mg by mouth daily   ascorbic acid (VITAMIN C) 500 mg tablet   Yes Yes   Sig: Take 500 mg by mouth daily   budesonide-formoterol (SYMBICORT) 160-4 5 mcg/act inhaler   Yes Yes   Sig: Inhale 2 puffs 2 (two) times a day Rinse mouth after use     carvedilol (COREG) 25 mg tablet   Yes Yes   Sig: Take 25 mg by mouth 2 (two) times a day with meals   enalapril (VASOTEC) 20 mg tablet   Yes Yes   Sig: Take 20 mg by mouth 2 (two) times a day   prasugrel (EFFIENT) tablet   Yes Yes   Sig: Take 10 mg by mouth   predniSONE 10 mg tablet   Yes Yes   Sig: Take 10 mg by mouth daily traMADol-acetaminophen (ULTRACET) 37 5-325 mg per tablet   Yes Yes   Sig: Take 1 tablet by mouth 2 (two) times a day as needed for moderate pain   venlafaxine (EFFEXOR-XR) 150 mg 24 hr capsule   Yes Yes   Sig: Take 150 mg by mouth daily      Facility-Administered Medications: None       Past Medical History:   Diagnosis Date    COPD [facility]chronic obstructive pulmonary disease) ([facility] [address] )     Hyperlipidemia     Hypertension     Renal disorder     benign kidney tumor       Past Surgical History:   Procedure Laterality Date    APPENDECTOMY      SPINAL FUSION      L7 L9       Family History   Problem Relation Age of Onset    Cancer Father      I have reviewed and agree with the history as documented  Social History   Substance Use Topics    Smoking status: Current Every Day Smoker    Smokeless tobacco: Never Used    Alcohol use No        Review of Systems   Constitutional: Negative for chills, diaphoresis and fever  HENT: Negative for congestion and sore throat  Eyes: Negative for visual disturbance  Respiratory: Positive for cough and shortness of breath  Negative for chest tightness and wheezing  Cardiovascular: Negative for chest pain and leg swelling  Gastrointestinal: Negative for abdominal pain, constipation, diarrhea, nausea and vomiting  Genitourinary: Negative for difficulty urinating, dysuria, frequency, hematuria, urgency, vaginal bleeding, vaginal discharge and vaginal pain  Musculoskeletal: Negative for arthralgias and myalgias  Neurological: Negative for dizziness, weakness, light-headedness, numbness and headaches  Psychiatric/Behavioral: The patient is not nervous/anxious  Physical Exam  Physical Exam   Constitutional: She is oriented to person, place, and time  She appears well-developed and well-nourished  No distress  HENT:   Head: Normocephalic and atraumatic  Eyes: Pupils are equal, round, and reactive to light  Neck: Normal range of motion  Neck supple   No tracheal deviation present  Cardiovascular: Normal rate, regular rhythm, normal heart sounds and intact distal pulses  Exam reveals no gallop and no friction rub  No murmur heard  Pulmonary/Chest: Effort normal  No respiratory distress  She has wheezes (scattered, no distress, good air movement on home 5L)  She has no rales  Abdominal: Soft  Bowel sounds are normal  She exhibits no distension and no mass  There is no tenderness  There is no guarding  Musculoskeletal: She exhibits no edema or deformity  Tenderness across entire spine, most notable in lumbar region  Normal rom and strength of extremities  Normal sensation   Neurological: She is alert and oriented to person, place, and time  Skin: Skin is warm and dry  She is not diaphoretic  Psychiatric: She has a normal mood and affect  Her behavior is normal    Nursing note and vitals reviewed        Vital Signs  ED Triage Vitals   Temperature Pulse Respirations Blood Pressure SpO2   12/16/18 1316 12/16/18 1247 12/16/18 1247 12/16/18 1247 12/16/18 1247   98 4 °F (36 9 °C) 99 21 (!) 184/92 95 %      Temp Source Heart Rate Source Patient Position - Orthostatic VS BP Location FiO2 (%)   12/16/18 1316 12/16/18 1400 12/16/18 1400 12/16/18 1400 --   Oral Monitor Lying Left arm       Pain Score       12/16/18 1709       Worst Possible Pain           Vitals:    12/16/18 2054 12/16/18 2259 12/17/18 0722 12/17/18 1522   BP:  141/72 (!) 186/86 161/78   Pulse: (!) 110 (!) 112 92 97   Patient Position - Orthostatic VS:   Lying Sitting       Visual Acuity      ED Medications  Medications   amLODIPine (NORVASC) tablet 5 mg (5 mg Oral Given 12/17/18 0804)   budesonide-formoterol (SYMBICORT) 160-4 5 mcg/act inhaler 2 puff (2 puffs Inhalation Given 12/17/18 0805)   carvedilol (COREG) tablet 25 mg (25 mg Oral Given 12/17/18 0804)   lisinopril (ZESTRIL) tablet 20 mg (20 mg Oral Given 12/17/18 0804)   HYDROcodone-acetaminophen (NORCO) 5-325 mg per tablet 1 tablet (1 tablet Oral Given 12/17/18 1534)   prasugrel (EFFIENT) tablet 10 mg (10 mg Oral Given 12/17/18 0804)   venlafaxine (EFFEXOR-XR) 24 hr capsule 150 mg (150 mg Oral Given 12/17/18 0804)   nicotine (NICODERM CQ) 14 mg/24hr TD 24 hr patch 1 patch (1 patch Transdermal Medication Applied 12/17/18 0804)   heparin (porcine) subcutaneous injection 5,000 Units (5,000 Units Subcutaneous Given 12/17/18 1310)   predniSONE tablet 40 mg (40 mg Oral Given 12/17/18 0804)   levalbuterol (XOPENEX) inhalation solution 1 25 mg (1 25 mg Nebulization Given 12/17/18 1418)     And   sodium chloride 0 9 % inhalation solution 3 mL (3 mL Nebulization Given 12/17/18 1418)   nystatin (MYCOSTATIN) oral suspension 500,000 Units (500,000 Units Swish & Swallow Given 12/17/18 1303)   albuterol (PROVENTIL HFA,VENTOLIN HFA) inhaler 2 puff (not administered)   ipratropium-albuterol (DUO-NEB) 0 5-2 5 mg/3 mL inhalation solution 3 mL (3 mL Nebulization Given 12/16/18 1314)   methylPREDNISolone sodium succinate (Solu-MEDROL) injection 125 mg (125 mg Intravenous Given 12/16/18 1315)   morphine injection 2 mg (2 mg Intravenous Given 12/16/18 1442)   iohexol (OMNIPAQUE) 350 MG/ML injection (MULTI-DOSE) 100 mL (100 mL Intravenous Given 12/16/18 1608)   potassium chloride 20 mEq IVPB (premix) (20 mEq Intravenous New Bag 12/16/18 2146)       Diagnostic Studies  Results Reviewed     Procedure Component Value Units Date/Time    Procalcitonin [[id number]]  (Normal) Collected:  12/16/18 2015    Lab Status:  Final result Specimen:  Blood from Arm, Left Updated:  12/17/18 0001     Procalcitonin <0 05 ng/ml     Magnesium [[id number]]  (Normal) Collected:  12/16/18 2015    Lab Status:  Final result Specimen:  Blood from Hand, Left Updated:  12/16/18 2030     Magnesium 1 7 mg/dL     Platelet count [[id number]]  (Abnormal) Collected:  12/16/18 2015    Lab Status:  Final result Specimen:  Blood from Hand, Left Updated:  12/16/18 2021     Platelets 022 Thousands/uL      MPV 8 8 (L) fL     Troponin I [[id number]]  (Normal) Collected:  12/16/18 1605    Lab Status:  Final result Specimen:  Blood from Arm, Right Updated:  12/16/18 1628     Troponin I 0 02 ng/mL     Lactic acid, plasma [[id number]]  (Normal) Collected:  12/16/18 1307    Lab Status:  Final result Specimen:  Blood from Arm, Left Updated:  12/16/18 1347     LACTIC ACID 0 8 mmol/L     Narrative:         Result may be elevated if tourniquet was used during collection  Comprehensive metabolic panel [[id number]]  (Abnormal) Collected:  12/16/18 1307    Lab Status:  Final result Specimen:  Blood from Arm, Right Updated:  12/16/18 1346     Sodium 141 mmol/L      Potassium 3 1 (L) mmol/L      Chloride 96 (L) mmol/L      CO2 42 (H) mmol/L      ANION GAP 3 (L) mmol/L      BUN 11 mg/dL      Creatinine 0 60 mg/dL      Glucose 101 mg/dL      Calcium 9 5 mg/dL      AST 10 U/L      ALT 11 (L) U/L      Alkaline Phosphatase 120 (H) U/L      Total Protein 7 3 g/dL      Albumin 3 5 g/dL      Total Bilirubin 0 20 mg/dL      eGFR 91 ml/min/1 73sq m     Narrative:         National Kidney Disease Education Program recommendations are as follows:  GFR calculation is accurate only with a steady state creatinine  Chronic Kidney disease less than 60 ml/min/1 73 sq  meters  Kidney failure less than 15 ml/min/1 73 sq  meters      CBC and differential [[id number]]  (Abnormal) Collected:  12/16/18 1307    Lab Status:  Final result Specimen:  Blood from Arm, Left Updated:  12/16/18 1328     WBC 10 04 Thousand/uL      RBC 3 55 (L) Million/uL      Hemoglobin 11 1 (L) g/dL      Hematocrit 34 1 (L) %      MCV 96 fL      MCH 31 3 pg      MCHC 32 6 g/dL      RDW 12 8 %      MPV 8 8 (L) fL      Platelets 770 Thousands/uL      nRBC 0 /100 WBCs      Neutrophils Relative 46 %      Immat GRANS % 0 %      Lymphocytes Relative 44 %      Monocytes Relative 10 %      Eosinophils Relative 0 %      Basophils Relative 0 %      Neutrophils Absolute 4 64 Thousands/µL      Immature Grans Absolute 0 03 Thousand/uL      Lymphocytes Absolute 4 39 Thousands/µL      Monocytes Absolute 0 95 Thousand/µL      Eosinophils Absolute 0 02 Thousand/µL      Basophils Absolute 0 01 Thousands/µL                  XR chest portable   Final Result by Aydee Campos MD (12/17 8042)      No acute cardiopulmonary disease  Workstation performed: [id number]         CT recon only thoracolumbar   Final Result by Manju Pizano MD (12/16 6508)      Age indeterminate severe compression fracture deformity of L1 with associated slight retropulsion of the posterior cortex into the spinal canal resulting in mild canal narrowing  There are also age indeterminate compression fracture deformities of the    superior endplate of K37, L3 and L4  Workstation performed: [id number]         PE Study with CT abdomen & pelvis with contrast   Final Result by Manju Pizano MD (12/16 2774)      1  No evidence of pulmonary embolism  2   Well-circumscribed nodule in the left adrenal gland  This may represent a small adrenal adenoma and correlation with older studies to determine if this has been stable is recommended  If older studies are not available, nonemergent CT adrenal    protocol can be performed for further evaluation  3   Diverticulosis coli  4   Multiple age-indeterminate compression deformities in the thoracolumbar spine as above  Workstation performed: [id number]                    Procedures  Procedures       Phone Contacts  ED Phone Contact    ED Course  ED Course as of Dec 17 1541   Sun Dec 16, 2018   1622 EKG Interpretation    Rate: 99 BPM  Rhythm: NSR  Axis: R  Intervals: Normal, no blocks, QTc 459ms  Q waves: III  T waves: normal  ST segments: no dep / elevation    Impression: normal EKG  EKG for comparison: no prior  EKG interpreted by me                                     MDM  Number of Diagnoses or Management Options  Ambulatory dysfunction:   COPD exacerbation [facility]):   Diagnosis management comments: DDX includes but not ltd to:   Likely copd ex, consider pna / bronchitis  At risk for pe bc of recent kyphoplasty  Consider acute on chronic back pain, no apparent new trauma  Doubt acs as no cp but consider  Plan is to obtain:  CBC to check for anemia, leukocytosis, hydration status  Chemistry panel to check for lyte abnormalities, organ function   EKG/trop to check for ischemic changes   CXR to check for congestive changes, active pulmonary disease   CTA PE study for PE w spine recons     Based on results:  Pt signed out to Alvena Eisenmenger PA-C w plan to admit her if PE, acute NEW abnormality of back or if pna, or increased home O2 requirements  If all is neg and pt is on her home O2 she can go home  Return parameters discussed  Pt requires f/u as an outpt  Pt expresses understanding w above treatment plan  All questions answered prior to d/c  Portions of the record may have been created with voice recognition software   Occasional wrong word or "sound a like" substitutions may have occurred due to the inherent limitations of voice recognition software   Read the chart carefully and recognize, using context, where substitutions have occurred  CritCare Time    Disposition  Final diagnoses:   [facility]   Ambulatory dysfunction     Time reflects when diagnosis was documented in both MDM as applicable and the Disposition within this note     Time User Action Codes Description Comment    12/16/2018  5:53 PM Chepe PHILIP Add [J44 1] COPD exacerbation ([facility] [address] )     12/16/2018  5:53 PM Cheyanne Vieira Add [R26 2] Ambulatory dysfunction       ED Disposition     ED Disposition Condition Comment    Admit  Case was discussed with Dr Yumi Fabian and the patient's admission status was agreed to be Admission Status: observation status to the service of Dr Yumi Fabian          Follow-up Information    None         Current Discharge Medication List      CONTINUE these medications which have NOT CHANGED    Details   albuterol (VENTOLIN HFA) 90 mcg/act inhaler Inhale 2 puffs every 6 (six) hours as needed for wheezing      amLODIPine (NORVASC) 5 mg tablet Take 5 mg by mouth daily      ascorbic acid (VITAMIN C) 500 mg tablet Take 500 mg by mouth daily      budesonide-formoterol (SYMBICORT) 160-4 5 mcg/act inhaler Inhale 2 puffs 2 (two) times a day Rinse mouth after use  Calcium-Magnesium-Vitamin D (CALCIUM 500 PO) Take 1,000 mg by mouth daily      carvedilol (COREG) 25 mg tablet Take 25 mg by mouth 2 (two) times a day with meals      enalapril (VASOTEC) 20 mg tablet Take 20 mg by mouth 2 (two) times a day      HYDROcodone-acetaminophen (NORCO) 5-325 mg per tablet Take 1 tablet by mouth every 6 (six) hours as needed for pain      Potassium 99 MG TABS Take 99 mg by mouth      prasugrel (EFFIENT) tablet Take 10 mg by mouth      predniSONE 10 mg tablet Take 10 mg by mouth daily      traMADol-acetaminophen (ULTRACET) 37 5-325 mg per tablet Take 1 tablet by mouth 2 (two) times a day as needed for moderate pain      venlafaxine (EFFEXOR-XR) 150 mg 24 hr capsule Take 150 mg by mouth daily           No discharge procedures on file      ED Provider  Electronically Signed by           Henrry Pope PA-C  12/17/18 0926

## 2018-12-17 NOTE — PROGRESS NOTES
Progress Note - Raheel Hylton 1945, 68 y o  female MRN: [id number]    Unit/Bed#: -01 Encounter: [id number]    Primary Care Provider: Patricia Maya MD   Date and time admitted to hospital: 12/16/2018 12:40 PM        Tobacco abuse   Assessment & Plan    · Patient reports she smokes 6-8 cigarettes daily  · Counseled on cessation  · Nicotine patch     Acute low back pain   Assessment & Plan    CT Age indeterminate severe compression fracture deformity of L1 with associated slight retropulsion of the posterior cortex into the spinal canal resulting in mild canal narrowing  There are also age indeterminate compression fracture deformities of the  superior endplate of Z93, L3 and L4  Lidoderm patch, Aqua K  -Continue Pain medication   -PT/OT   Will d/w neurosurgery  Receiving steroid     Essential hypertension   Assessment & Plan    Elevated 180/90 mmhg  Denies any headaches or blurry vision at this time  Patient did not take her medications this morning, will reorder home medications and continue to monitor blood pressure       Hypokalemia   Assessment & Plan    Replace K  BMP am     Ambulatory dysfunction   Assessment & Plan    PT/OT evaluation   Patient uses a walker at baseline  Continue Pain medication      * COPD exacerbation (HCC)   Assessment & Plan    CT chest no acute abnormalities  -IV Steroid  -respiratory protocol  -continue nasal cannula patient is on home O2 she states at 5 L         VTE Pharmacologic Prophylaxis:   Pharmacologic: Heparin  Mechanical VTE Prophylaxis in Place: Yes    Patient Centered Rounds: I have performed bedside rounds with nursing staff today  Discussions with Specialists or Other Care Team Provider:     Education and Discussions with Family / Patient:     Time Spent for Care: 30 minutes  More than 50% of total time spent on counseling and coordination of care as described above      Current Length of Stay: 0 day(s)    Current Patient Status: Observation Certification Statement: The patient will continue to require additional inpatient hospital stay due to back pain    Discharge Plan: PT consult    Code Status: Level 1 - Full Code      Subjective:   C/o continued mid-low back pain    Objective:     Vitals:   Temp (24hrs), Av 1 °F (36 7 °C), Min:97 8 °F (36 6 °C), Max:98 4 °F (36 9 °C)    Temp:  [97 8 °F (36 6 °C)-98 4 °F (36 9 °C)] 97 8 °F (36 6 °C)  HR:  [] 92  Resp:  [18-26] 20  BP: (141-201)/(72-92) 186/86  SpO2:  [86 %-97 %] 94 %  Body mass index is 21 34 kg/m²  Input and Output Summary (last 24 hours): Intake/Output Summary (Last 24 hours) at 18 1043  Last data filed at 18   Gross per 24 hour   Intake              240 ml   Output                0 ml   Net              240 ml       Physical Exam:     Physical Exam   Constitutional: She is oriented to person, place, and time  She appears well-developed and well-nourished  HENT:   Head: Normocephalic and atraumatic  Eyes: Pupils are equal, round, and reactive to light  Cardiovascular: Normal rate and regular rhythm  Pulmonary/Chest: Effort normal and breath sounds normal    Abdominal: Soft  Bowel sounds are normal    Musculoskeletal: She exhibits no edema  Neurological: She is alert and oriented to person, place, and time  Skin: Skin is warm and dry           Additional Data:     Labs:      Results from last 7 days  Lab Units 18  1307   WBC Thousand/uL  --  10 04   HEMOGLOBIN g/dL  --  11 1*   HEMATOCRIT %  --  34 1*   PLATELETS Thousands/uL 280 262   NEUTROS PCT %  --  46   LYMPHS PCT %  --  44   MONOS PCT %  --  10   EOS PCT %  --  0       Results from last 7 days  Lab Units 18  1307   SODIUM mmol/L 141   POTASSIUM mmol/L 3 1*   CHLORIDE mmol/L 96*   CO2 mmol/L 42*   BUN mg/dL 11   CREATININE mg/dL 0 60   ANION GAP mmol/L 3*   CALCIUM mg/dL 9 5   ALBUMIN g/dL 3 5   TOTAL BILIRUBIN mg/dL 0 20   ALK PHOS U/L 120*   ALT U/L 11*   AST U/L 10   GLUCOSE RANDOM mg/dL 101                   Results from last 7 days  Lab Units 12/16/18 2015 12/16/18  1307   LACTIC ACID mmol/L  --  0 8   PROCALCITONIN ng/ml <0 05  --            * I Have Reviewed All Lab Data Listed Above  * Additional Pertinent Lab Tests Reviewed: All Labs Within Last 24 Hours Reviewed    Imaging:    Imaging Reports Reviewed Today Include:   Imaging Personally Reviewed by Myself Includes:      Recent Cultures (last 7 days):           Last 24 Hours Medication List:     Current Facility-Administered Medications:  albuterol 2 puff Inhalation Q4H PRN Rito Mayberry MD   amLODIPine 5 mg Oral Daily Rito Mayberry MD   budesonide-formoterol 2 puff Inhalation BID Rito Mayberry MD   carvedilol 25 mg Oral BID With Meals Rito Mayberry MD   heparin (porcine) 5,000 Units Subcutaneous Q8H [facility] Rito Mayberry MD   HYDROcodone-acetaminophen 1 tablet Oral Q6H PRN Rito Mayberry MD   levalbuterol 1 25 mg Nebulization TID Rito Mayberry MD   And       sodium chloride 3 mL Nebulization TID Rito Mayberry MD   lisinopril 20 mg Oral Daily Rito Mayberry MD   nicotine 1 patch Transdermal Daily Rito Mayberry MD   nystatin 500,000 Units Swish & Swallow 4x Daily Rito Mayberry MD   prasugrel 10 mg Oral Daily Rito Mayberry MD   predniSONE 40 mg Oral Daily Rito Mayberry MD   venlafaxine 150 mg Oral Daily Rito Mayberry MD        Today, Patient Was Seen By: Basilia Potts MD    ** Please Note: Dictation voice to text software may have been used in the creation of this document   **

## 2018-12-17 NOTE — SOCIAL WORK
CM met with patient at bedside  Patient was informed of OBS status and given a copy of OBS notice  Signed OBS is in the chart  Nurse and SLIM notified

## 2018-12-17 NOTE — ASSESSMENT & PLAN NOTE
CT Age indeterminate severe compression fracture deformity of L1 with associated slight retropulsion of the posterior cortex into the spinal canal resulting in mild canal narrowing  There are also age indeterminate compression fracture deformities of the  superior endplate of D20, L3 and L4      Lidoderm patch, Aqua K  -Continue Pain medication   -PT/OT   Will d/w neurosurgery  Receiving steroid

## 2018-12-17 NOTE — OCCUPATIONAL THERAPY NOTE
Occupational Therapy Evaluation        Patient Name: Jimmy ALLISONBN'F Date: 12/17/2018 12/17/18 1824   Note Type   Note type Eval only   Restrictions/Precautions   Weight Bearing Precautions Per Order No   Braces or Orthoses Other (Comment)  (none per patient)   Pain Assessment   Pain Assessment 0-10   Pain Score 8   Pain Location Back; Hip   Pain Orientation Right   Pain Radiating Towards rigth hip and upper leg   Pain Descriptors Aching;Cramping;Discomfort   Pain Frequency Constant/continuous   Pain Onset Ongoing   Clinical Progression Not changed   Patient's Stated Pain Goal No pain   Hospital Pain Intervention(s) Emotional support;Repositioned;Relaxation technique   Response to Interventions patient was reporsitioned in bed with assist of 2, semi upright sitting position in bed with bed pillows supporting legs,    Home Living   Type of [address] [place] [address] One level;Stairs to enter with rails  (4 ADELINA)   Bathroom Shower/Tub Tub/shower unit   Bathroom Toilet Standard   Bathroom Equipment Grab bars in shower; Shower chair   [address] [place]   Additional Comments ambulatory with RW indoors/ outdoors   Prior Function   Level of [place] Independent with ADLs and functional mobility   Lives With Alone   Receives Help From Friend(s)   ADL Assistance Independent   IADLs Independent   Falls in the last 6 months 0   Vocational Retired   Lifestyle   Autonomy Patient reporting indepependnet with ADLs/ IADLs up to 3 weeks ago when the back pain excacerbated to the point of requiring assistance in all areas  Patient ambulates with RW,  drives and manages her affairs independently     Reciprocal Relationships Supportive Friends   Psychosocial   Psychosocial (WDL) WDL   ADL   Eating Assistance 5  Supervision/Setup   Grooming Assistance 4  Minimal Assistance   UB Bathing Assistance 4  Minimal Assistance   LB Bathing Assistance 2  Maximal Assistance   UB Dressing Assistance 3  Moderate Assistance   LB Dressing Assistance 2  Maximal Assistance   Toileting Assistance  2  Maximal Assistance   Functional Assistance 2  Maximal Assistance   Bed Mobility   Additional Comments maintained log rolling precautions to reposition patient in bed, patient complained of severe back pain with positional changes  Transfers   Sit to Stand Unable to assess  (deferred, further clarification needed for OOB status)   Functional Mobility   Additional Comments OT completed bed level assessment secondary to significant  pain and patient's disposition  Further clarification needed on OOB statua, RN Elaina made aware  Activity Tolerance   Activity Tolerance Patient limited by pain   Nurse Made Aware RN Elaina verblaized patient appropriate for therapy  RUE Assessment   RUE Assessment X  (AROM WFL, strength deficit)   RUE Strength   RUE Overall Strength Deficits  (3/5)   LUE Assessment   LUE Assessment X  (AROM WFL, strength deficit)   LUE Strength   LUE Overall Strength Deficits  (3/5)   Hand Function   Gross Motor Coordination Impaired   Fine Motor Coordination Impaired   Sensation   Light Touch No apparent deficits   Vision-Basic Assessment   Current Vision Does not wear glasses   Patient Visual Report (No significant changes reported)   Cognition   Overall Cognitive Status WFL   Arousal/Participation Alert; Responsive   Attention Within functional limits   Orientation Level Oriented X4   Memory Within functional limits   Following Commands Follows all commands and directions without difficulty   Assessment   Limitation Decreased ADL status; Decreased UE strength;Decreased endurance;Decreased fine motor control;Decreased self-care trans;Decreased high-level ADLs   Prognosis Good   Assessment Patient is a 68 y o  female seen for OT evaluation s/p admit to [id number] [facility] on 12/16/2018 w/COPD exacerbation ([facility] [address] )   Patient presented to ED with  increased shortness of breath and lower back pain  Commorbidities affecting patient's functional performance at time of assessment include: HTN, hypokalemia, ambulatory dysfunction, tobacco abuse  Orders placed for OT evaluation and treatment  Performed at least two patient identifiers during session including name and wristband  Prior to admission, Patient reporting independent with ADLs/ IADLs up to 3 weeks ago when the back pain excacerbated to the point of requiring assistance in all areas  Patient ambulates with RW,  drives and manages her affairs independently  Personal factors affecting patient at time of initial evaluation include: limited caregiver support, steps to enter, decreased initiation and engagement, difficulty performing ADLs and difficulty performing IADLs  Upon evaluation, patient requires moderate assist for UB ADLs, maximal   Occupational performance is affected by the following deficits: irritability, decreased functional use of BUEs, decreased muscle strength, degenerative arthritic joint changes, impaired gross motor coordination, dynamic sit/ stand balance deficit with poor standing tolerance time for self care and functional mobility, decreased activity tolerance, decreased safety awareness and postural control and postural alignment deficit, requiring external assistance to complete transitional movements  Therapist completed  extensive additional review of medical records and additional review of physical, cognitive or psychosocial history, clinical examination identifying 5 or more performance deficits, clinical decision making of a high complexity , consistent with a high complexity level evaluation  Patient to benefit from continued Occupational Therapy treatment while in the hospital to address deficits as defined above and maximize level of functional independence with ADLs and functional mobility   Occupational Performance areas to address include: grooming , bathing/ shower, dressing, toilet hygiene, transfer to all surfaces, functional mobility, health maintenance, medication routine/ management, IADLS: Household maintenance, IADLs: safety procedures, IADLs: meal prep/ clean up, Leisure Participation and Social participation  From OT standpoint, recommendation at time of d/c would be Short Term Rehab  Plan   Treatment Interventions ADL retraining;Functional transfer training;UE strengthening/ROM; Endurance training;Patient/family training;Equipment evaluation/education; Fine motor coordination activities; Compensatory technique education;Continued evaluation; Energy conservation;Cardiac education   Goal Expiration Date 12/31/18   OT Frequency 3-5x/wk   Recommendation   OT Discharge Recommendation Short Term Rehab   Barthel Index   Feeding 5   Bathing 0   Grooming Score 0   Dressing Score 5   Bladder Score 5   Bowels Score 5   Toilet Use Score 5   Transfers (Bed/Chair) Score 5   Mobility (Level Surface) Score 0   Stairs Score 0   Barthel Index Score 30   Modified Dennis Scale   Modified [place] Scale 4     Occupational Therapy goals: In 7-14 days:     1- Patient will verbalize and demonstrate use of energy conservation/ deep breathing technique and work simplification skills during functional activity with no verbal cues  2-Patient will verbalize and demonstrate good body mechanics and joint protection techniques during  ADLs/ IADLs with no verbal cues  3- Patient will increase OOB/ sitting tolerance to 2-4 hours per day for increased participation in self care and leisure tasks with no s/s of exertion  4-Patient will increase standing tolerance time to 5  minutes with unilateral UE support to complete sink level ADLs@ mod I level   5- Patient will increase sitting tolerance at edge of bed to 20 minutes to complete UB ADLs @ set up assist level  6- Patient will transfer bed to Chair / toilet at Set up assist level with AD as indicated    7- Patient will complete UB ADLs with set up assist  8- Patient will complete LB ADLs with min assist with the use of adaptive equipment  9- Patient will complete toileting hygiene with set up assist/ supervision for thoroughness    10-Patient/ Family  will demonstrate competency with UE Home Exercise Program

## 2018-12-17 NOTE — UTILIZATION REVIEW
Initial Clinical Review    Admission: Date/Time/Statement: 12/16/18 @ 1752 Observation Written     Orders Placed This Encounter   Procedures    Place in Observation (expected length of stay for this patient is less than two midnights)     Standing Status:   Standing     Number of Occurrences:   1     Order Specific Question:   Admitting Physician     Answer:   Manju Montgomery [[id number]]     Order Specific Question:   Level of Care     Answer:   Med Surg [16]         ED: Date/Time/Mode of Arrival:   ED Arrival Information     Expected Arrival Acuity Means of Arrival Escorted By Service Admission Type    - 12/16/2018 12:34 Emergent Walk-In Family Member General Medicine Emergency    Arrival Complaint    back pain/sob          Chief Complaint:   Chief Complaint   Patient presents with    Shortness of Breath     patient presents with c/o increased SOB over the last week  Patient with hx of COPD and chronically uses 5L O2  patient tachypnic with anxiety during triage     Back Pain       History of Illness: Radha Schwartz is a 68 y o  female who presents with increased shortness of breath and lower back pain  Patient continues to smoke at home and uses 5 L oxygen home baseline  She presents to the ED with increased shortness of breath that she reports started worsening this morning  She is also complaining of low back pain that she has been experiencing for approximately 1 month she recently had some type of spine surgery  She denies any chest pain or chest tightness reports dyspnea and shortness of breath  She denies any headaches lightheadedness dizziness  Patient's story is very confusing  Patient provides multiple answers when asked what caused charge to the ED  Patient states that she can't walk and then reports that she can walk with a walker      ED Vital Signs:   ED Triage Vitals   Temperature Pulse Respirations Blood Pressure SpO2   12/16/18 1316 12/16/18 1247 12/16/18 1247 12/16/18 1247 12/16/18 1247   98 4 °F (36 9 °C) 99 21 (!) 184/92 95 %      Temp Source Heart Rate Source Patient Position - Orthostatic VS BP Location FiO2 (%)   12/16/18 1316 12/16/18 1400 12/16/18 1400 12/16/18 1400 --   Oral Monitor Lying Left arm       Pain Score       12/16/18 1709       Worst Possible Pain        Wt Readings from Last 1 Encounters:   12/16/18 61 8 kg (136 lb 3 9 oz)       Vital Signs (abnormal):   Date/Time  Pulse  Resp  BP  SpO2  O2 Device   12/17/18 0722  92  20   186/86  96 %  Nasal cannula   12/16/18 2259   112  --  141/72  96 %  --   12/16/18 2054   110  20  --  94 %  Nasal cannula   12/16/18 1935   108  20   176/86  95 %  Nasal cannula   12/16/18 1840   117   26  --   86 %  Nasal cannula   12/16/18 1709   109  18   199/91  95 %  Nasal cannula   12/16/18 1500  102  20   188/90  97 %  --   12/16/18 1400   107  18   201/91  --  Nasal cannula     Abnormal Labs/Diagnostic Test Results:   HEMOGLOBIN 11 1*   HEMATOCRIT 34 1*     POTASSIUM 3 1*   CHLORIDE 96*   CO2 42*   ANION GAP 3*   ALK PHOS 120*   ALT 11*     CT recon only thoracolumbar   Age indeterminate severe compression fracture deformity of L1 with associated slight retropulsion of the posterior cortex into the spinal canal resulting in mild canal narrowing  There are also age indeterminate compression fracture deformities of the    superior endplate of G48, L3 and L4  PE Study with CT abdomen & pelvis with contrast   1  No evidence of pulmonary embolism  2   Well-circumscribed nodule in the left adrenal gland  This may represent a small adrenal adenoma and correlation with older studies to determine if this has been stable is recommended  If older studies are not available, nonemergent CT adrenal    protocol can be performed for further evaluation  3   Diverticulosis coli  4   Multiple age-indeterminate compression deformities in the thoracolumbar spine as above       CXR:  NAD    ED Treatment:   Medication Administration from 12/16/2018 1234 to 12/16/2018 1932       Date/Time Order Dose Route Action     12/16/2018 1314 ipratropium-albuterol (DUO-NEB) 0 5-2 5 mg/3 mL inhalation solution 3 mL 3 mL Nebulization Given     12/16/2018 1315 methylPREDNISolone sodium succinate (Solu-MEDROL) injection 125 mg 125 mg Intravenous Given     12/16/2018 1442 morphine injection 2 mg 2 mg Intravenous Given     12/16/2018 1608 iohexol (OMNIPAQUE) 350 MG/ML injection (MULTI-DOSE) 100 mL 100 mL Intravenous Given          Past Medical/Surgical History:    Active Ambulatory Problems     Diagnosis Date Noted    No Active Ambulatory Problems     Resolved Ambulatory Problems     Diagnosis Date Noted    No Resolved Ambulatory Problems     Past Medical History:   Diagnosis Date    COPD [facility]chronic obstructive pulmonary disease) ([facility] [address]     Hyperlipidemia     Hypertension     Renal disorder        Admitting Diagnosis: Back pain [M54 9]  SOB (shortness of breath) [R06 02]  COPD exacerbation (HCC) [J44 1]  Ambulatory dysfunction [R26 2]    Age/Sex: 68 y o  female    Assessment/Plan:   COPD exacerbation (HCC)   Assessment & Plan     CT chest no acute abnormalities  -IV Steroid:  Transitioned to by mouth tomorrow  -respiratory protocol  -continue nasal cannula patient is on home O2 she states at 5 L      Acute low back pain   Assessment & Plan     CT Age indeterminate severe compression fracture deformity of L1 with associated slight retropulsion of the posterior cortex into the spinal canal resulting in mild canal narrowing   There are also age indeterminate compression fracture deformities of the  superior endplate of G11, L3 and L4     Lidoderm patch, Aqua K  -Acute on chromic  -Continue Pain medication   -PT/OT       Essential hypertension   Assessment & Plan     Elevated 180/90 mmhg  Denies any headaches or blurry vision at this time  Patient did not take her medications this morning, will reorder home medications and continue to monitor blood pressure         Hypokalemia Assessment & Plan     Replace K  BMP am      Ambulatory dysfunction   Assessment & Plan     PT/OT evaluation   Patient uses a walker at baseline  Continue Pain medication       Tobacco abuse   Assessment & Plan     · Patient reports she smokes 6-8 cigarettes daily  · Counseled on cessation  · Nicotine patch         VTE Prophylaxis: Heparin  / sequential compression device   Anticipated Length of Stay:  Patient will be admitted on an Observation basis with an anticipated length of stay of  <2 midnights     Justification for Hospital Stay:  Patient will require IV steroids, physical therapy consultation and Respiratory protocol    Admission Orders:  Regular diet  Pt, ot  Consult cm  Sequential compression device  EKG    Scheduled Meds:   Current Facility-Administered Medications:  albuterol 2 puff Inhalation Q4H PRN   amLODIPine 5 mg Oral Daily   budesonide-formoterol 2 puff Inhalation BID   carvedilol 25 mg Oral BID With Meals   heparin (porcine) 5,000 Units Subcutaneous Q8H [facility]   HYDROcodone-acetaminophen 1 tablet Oral Q6H PRN   levalbuterol 1 25 mg Nebulization TID   And      sodium chloride 3 mL Nebulization TID   lisinopril 20 mg Oral Daily   nicotine 1 patch Transdermal Daily   nystatin 500,000 Units Swish & Swallow 4x Daily   prasugrel 10 mg Oral Daily   predniSONE 40 mg Oral Daily   venlafaxine 150 mg Oral Daily     Continuous Infusions:    PRN Meds:   albuterol    HYDROcodone-acetaminophen

## 2018-12-18 ENCOUNTER — APPOINTMENT (INPATIENT)
Dept: RADIOLOGY | Facility: HOSPITAL | Age: 73
DRG: 542 | End: 2018-12-18
Payer: MEDICARE

## 2018-12-18 ENCOUNTER — APPOINTMENT (INPATIENT)
Dept: MRI IMAGING | Facility: HOSPITAL | Age: 73
DRG: 542 | End: 2018-12-18
Payer: MEDICARE

## 2018-12-18 PROCEDURE — 94640 AIRWAY INHALATION TREATMENT: CPT

## 2018-12-18 PROCEDURE — 99222 1ST HOSP IP/OBS MODERATE 55: CPT | Performed by: ORTHOPAEDIC SURGERY

## 2018-12-18 PROCEDURE — 99233 SBSQ HOSP IP/OBS HIGH 50: CPT | Performed by: INTERNAL MEDICINE

## 2018-12-18 PROCEDURE — 72110 X-RAY EXAM L-2 SPINE 4/>VWS: CPT

## 2018-12-18 PROCEDURE — 72148 MRI LUMBAR SPINE W/O DYE: CPT

## 2018-12-18 PROCEDURE — 94760 N-INVAS EAR/PLS OXIMETRY 1: CPT

## 2018-12-18 RX ORDER — ALENDRONATE SODIUM 70 MG/1
70 TABLET ORAL
Status: DISCONTINUED | OUTPATIENT
Start: 2018-12-19 | End: 2018-12-18

## 2018-12-18 RX ADMIN — VENLAFAXINE HYDROCHLORIDE 150 MG: 150 CAPSULE, EXTENDED RELEASE ORAL at 10:49

## 2018-12-18 RX ADMIN — PRASUGREL HYDROCHLORIDE 10 MG: 10 TABLET, FILM COATED ORAL at 09:34

## 2018-12-18 RX ADMIN — CARVEDILOL 25 MG: 12.5 TABLET, FILM COATED ORAL at 07:28

## 2018-12-18 RX ADMIN — HEPARIN SODIUM 5000 UNITS: 5000 INJECTION, SOLUTION INTRAVENOUS; SUBCUTANEOUS at 07:00

## 2018-12-18 RX ADMIN — HYDROCODONE BITARTRATE AND ACETAMINOPHEN 1 TABLET: 5; 325 TABLET ORAL at 19:01

## 2018-12-18 RX ADMIN — LISINOPRIL 20 MG: 20 TABLET ORAL at 09:34

## 2018-12-18 RX ADMIN — METHOCARBAMOL 500 MG: 500 TABLET ORAL at 19:01

## 2018-12-18 RX ADMIN — NYSTATIN 500000 UNITS: 500000 SUSPENSION ORAL at 22:32

## 2018-12-18 RX ADMIN — PREDNISONE 40 MG: 20 TABLET ORAL at 09:34

## 2018-12-18 RX ADMIN — METHOCARBAMOL 500 MG: 500 TABLET ORAL at 12:46

## 2018-12-18 RX ADMIN — HYDROCODONE BITARTRATE AND ACETAMINOPHEN 1 TABLET: 5; 325 TABLET ORAL at 12:44

## 2018-12-18 RX ADMIN — ISODIUM CHLORIDE 3 ML: 0.03 SOLUTION RESPIRATORY (INHALATION) at 13:22

## 2018-12-18 RX ADMIN — HYDROMORPHONE HYDROCHLORIDE 0.5 MG: 1 INJECTION, SOLUTION INTRAMUSCULAR; INTRAVENOUS; SUBCUTANEOUS at 03:16

## 2018-12-18 RX ADMIN — NICOTINE 1 PATCH: 14 PATCH TRANSDERMAL at 09:34

## 2018-12-18 RX ADMIN — LEVALBUTEROL HYDROCHLORIDE 1.25 MG: 1.25 SOLUTION, CONCENTRATE RESPIRATORY (INHALATION) at 19:59

## 2018-12-18 RX ADMIN — ISODIUM CHLORIDE 3 ML: 0.03 SOLUTION RESPIRATORY (INHALATION) at 19:59

## 2018-12-18 RX ADMIN — HEPARIN SODIUM 5000 UNITS: 5000 INJECTION, SOLUTION INTRAVENOUS; SUBCUTANEOUS at 22:32

## 2018-12-18 RX ADMIN — METHOCARBAMOL 500 MG: 500 TABLET ORAL at 06:14

## 2018-12-18 RX ADMIN — NYSTATIN 500000 UNITS: 500000 SUSPENSION ORAL at 17:39

## 2018-12-18 RX ADMIN — BUDESONIDE AND FORMOTEROL FUMARATE DIHYDRATE 2 PUFF: 160; 4.5 AEROSOL RESPIRATORY (INHALATION) at 09:35

## 2018-12-18 RX ADMIN — BUDESONIDE AND FORMOTEROL FUMARATE DIHYDRATE 2 PUFF: 160; 4.5 AEROSOL RESPIRATORY (INHALATION) at 17:37

## 2018-12-18 RX ADMIN — CARVEDILOL 25 MG: 12.5 TABLET, FILM COATED ORAL at 16:04

## 2018-12-18 RX ADMIN — NYSTATIN 500000 UNITS: 500000 SUSPENSION ORAL at 09:33

## 2018-12-18 RX ADMIN — NYSTATIN 500000 UNITS: 500000 SUSPENSION ORAL at 14:48

## 2018-12-18 RX ADMIN — HYDROMORPHONE HYDROCHLORIDE 0.5 MG: 1 INJECTION, SOLUTION INTRAMUSCULAR; INTRAVENOUS; SUBCUTANEOUS at 09:34

## 2018-12-18 RX ADMIN — HYDROCODONE BITARTRATE AND ACETAMINOPHEN 1 TABLET: 5; 325 TABLET ORAL at 06:14

## 2018-12-18 RX ADMIN — AMLODIPINE BESYLATE 5 MG: 5 TABLET ORAL at 09:34

## 2018-12-18 RX ADMIN — LEVALBUTEROL HYDROCHLORIDE 1.25 MG: 1.25 SOLUTION, CONCENTRATE RESPIRATORY (INHALATION) at 13:22

## 2018-12-18 RX ADMIN — HEPARIN SODIUM 5000 UNITS: 5000 INJECTION, SOLUTION INTRAVENOUS; SUBCUTANEOUS at 14:48

## 2018-12-18 NOTE — PLAN OF CARE

## 2018-12-18 NOTE — ASSESSMENT & PLAN NOTE
Patient currently on Prednisone 30 mg daily  For the first day will continue x 2 days then decrease by 10 mg

## 2018-12-18 NOTE — CONSULTS
Orthopedics   Azeb Tay 68 y o  female MRN: [id number]  Unit/Bed#: -01      Chief Complaint:   Back Pain    HPI:   68 y  o female complaining of Lumbar back pain  Patient presented to the emergency department with a chief complaint of shortness of breath and increased lower back pain  Patient states she started to notice an increase in her lower back pain over the last 3-4 months  She denies any new injuries or trauma to the area  Patient previously had a kyphoplasty of T7 and T9 done in [address] [place] [address]  She states that the pain feels very similar to what she experienced before her surgery  In September of 2018 she had an heart attack  She was previously getting steroid injections for her lower back which seemed to help with the pain  But since having the heart attack she has not been able to obtain these injections  She has been seen pain management and receiving Norco   She states her pain is dull an achy non moving and sharp when moving  Sometimes it radiates around the hip but does not go past the hip and into the lower extremity  She denies any numbness or tingling  She denies any constitutional signs or symptoms  Patient is on 5 L of oxygen for COPD while at home                                                                                  Review Of Systems:   · Skin: Normal  · Neuro: See HPI  · Musculoskeletal: See HPI  · 14 point review of systems negative except as stated above     Past Medical History:   Past Medical History:   Diagnosis Date    COPD [facility]chronic obstructive pulmonary disease) ([facility] [address]     Hyperlipidemia     Hypertension     Renal disorder     benign kidney tumor       Past Surgical History:   Past Surgical History:   Procedure Laterality Date    APPENDECTOMY      SPINAL FUSION      L7 L9       Family History:  Family history reviewed and non-contributory  Family History   Problem Relation Age of Onset    Cancer Father        Social History:  Social History Social History    Marital status:      Spouse name: N/A    Number of children: N/A    Years of education: N/A     Social History Main Topics    Smoking status: Current Every Day Smoker    Smokeless tobacco: Never Used    Alcohol use No    Drug use: No    Sexual activity: Not Asked     Other Topics Concern    None     Social History Narrative    None       Allergies:    Allergies   Allergen Reactions    Sulfa Antibiotics Anaphylaxis           Labs:    0  Lab Value Date/Time   HCT 34 1 (L) 12/16/2018 1307   HGB 11 1 (L) 12/16/2018 1307   WBC 10 04 12/16/2018 1307       Meds:    Current Facility-Administered Medications:     albuterol (PROVENTIL HFA,VENTOLIN HFA) inhaler 2 puff, 2 puff, Inhalation, Q4H PRN, Cheryle Skillern A Jimenez-Arias, MD    amLODIPine (NORVASC) tablet 5 mg, 5 mg, Oral, Daily, Venkatesh Booth MD, 5 mg at 12/18/18 0934    budesonide-formoterol (SYMBICORT) 160-4 5 mcg/act inhaler 2 puff, 2 puff, Inhalation, BID, Claudine Haney MD, 2 puff at 12/18/18 0935    carvedilol (COREG) tablet 25 mg, 25 mg, Oral, BID With Meals, Claudine Haney MD, 25 mg at 12/18/18 0728    heparin (porcine) subcutaneous injection 5,000 Units, 5,000 Units, Subcutaneous, Q8H [address], 5,000 Units at 12/18/18 0700 **AND** Platelet count, , , Once, Claudine Haney MD    HYDROcodone-acetaminophen (NORCO) 5-325 mg per tablet 1 tablet, 1 tablet, Oral, Q6H PRN, Claudine Haney MD, 1 tablet at 12/18/18 1244    HYDROmorphone (DILAUDID) injection 0 5 mg, 0 5 mg, Intravenous, Q4H PRN, Kat Alan MD, 0 5 mg at 12/18/18 0934    levalbuterol ([place]) inhalation solution 1 25 mg, 1 25 mg, Nebulization, TID, 1 25 mg at 12/17/18 2101 **AND** sodium chloride 0 9 % inhalation solution 3 mL, 3 mL, Nebulization, TID, Claudine Haney MD, 3 mL at 12/17/18 2101    lisinopril (ZESTRIL) tablet 20 mg, 20 mg, Oral, Daily, Claudine Haney MD, 20 mg at 12/18/18 8906    methocarbamol (ROBAXIN) tablet 500 mg, 500 mg, Oral, Q6H PRN, Rox Alan MD, 500 mg at 12/18/18 1246    nicotine (NICODERM CQ) 14 mg/24hr TD 24 hr patch 1 patch, 1 patch, Transdermal, Daily, Latoya Cortes MD, 1 patch at 12/18/18 0934    nystatin (MYCOSTATIN) oral suspension 500,000 Units, 500,000 Units, Swish & Swallow, 4x Daily, Latoya Cortes MD, 500,000 Units at 12/18/18 0933    prasugrel (EFFIENT) tablet 10 mg, 10 mg, Oral, Daily, Latoya Cortes MD, 10 mg at 12/18/18 0934    predniSONE tablet 40 mg, 40 mg, Oral, Daily, Venkatesh Booth MD, 40 mg at 12/18/18 0934    venlafaxine (EFFEXOR-XR) 24 hr capsule 150 mg, 150 mg, Oral, Daily, Latoya Cortes MD, 150 mg at 12/18/18 1049    Blood Culture:   No results found for: BLOODCX    Wound Culture:   No results found for: WOUNDCULT    Ins and Outs:  I/O last 24 hours: In: 360 [P O :360]  Out: 1100 [Urine:1100]          Physical Exam:   BP (!) 172/92   Pulse 91   Temp 97 6 °F (36 4 °C)   Resp 18   Ht 5' 7" (1 702 m)   Wt 61 7 kg (136 lb)   SpO2 97%   BMI 21 30 kg/m²   Gen: Alert and oriented to person, place, time  HEENT: EOMI, eyes clear, moist mucus membranes, hearing intact  Respiratory: Bilateral chest rise  No audible wheezing found  Cardiovascular: Regular Rate and Rhythm  Abdomen: soft nontender/nondistended  Musculoskeletal: BACK  · Skin intact, no open lesions  · Tenderness to palpation over Lumbar spine  · 4/5 strength with hip flexion/extension/abduction, Knee Flexion/extension, ankle dorsi/plantar flexion, EHL/FHL bilateral lower extremities  · Sensation intact L1-S1 bilateral lower extremities  · positive Straight leg raise  · 2+ deep tendon reflexes noted at patella tendon, achilles tendon bilateral lower extremities    Radiology:   I personally reviewed the films  MRI   Lumbar spine shows acute to subacute fractures seen involving T12, L1 and L4    Spondylolisthesis of L3 upon L4 and L5 upon S1  L5-S1 spondylolisthesis with moderate bilateral foraminal narrowing     _*_*_*_*_*_*_*_*_*_*_*_*_*_*_*_*_*_*_*_*_*_*_*_*_*_*_*_*_*_*_*_*_*_*_*_*_*_*_*_*_*    Assessment:  68 y  o female complaining of  Lumbar back pain    Plan:   · Patient has been having ongoing pain of the lumbar spine for 3-4 months  It has gotten progressively worse of recently  She recently had a heart attack in September and had a stent placed  She was getting cortisone injections for her back and has not been able to get an injection due to this  It was discussed with the patient that surgical intervention could be discussed as an outpatient given her medical comorbidities  Could consider consult to interventional Radiology if needed for immediate intervention due to ambulatory dysfunction and patient's comorbidities  Orthopedics would follow up with her as an outpatient otherwise  · Dispo: Ortho signing off      Markus Persaud PA-C

## 2018-12-18 NOTE — PHYSICAL THERAPY NOTE
Physical Therapy Cancellation Note      PT orders received  Pt currently pending ortho c/s regarding L1 compression deformity  Pt also pending lumbar spine MRI  Will follow up c pt once cleared by ortho       Mauricio Alfred, PT

## 2018-12-18 NOTE — ASSESSMENT & PLAN NOTE
With acute fractures in addition to chronic issues/fractures and significant osteoporosis  Back brace as above  Short-term rehab hopefully soon  · Will trial interval for pain management to q 4 hours for the hydrocodone   Will decrease dilaudid to 0 2 mg  · Continue TSLO brace

## 2018-12-18 NOTE — NUTRITION
Recommend add chocolate ensure compact BID at breakfast and dinner; discussed with patient and she is agreeable   RD does not have protocol and cannot make diet adjustments  -Davis Rios MPH RDN LDN

## 2018-12-18 NOTE — PROGRESS NOTES
[facility] Progress Note  Patient: Sravani Abarca 68 y o  female   MRN: [id number]  PCP: Daria Del Cid MD  Unit/Bed#: -01 Encounter: [id number]  Date Of Visit: 12/18/18          * COPD exacerbation [facility])   Assessment & Plan    Patient with chronic COPD-continued tobacco addiction  Strongly encouraged to quit  Continue with current treatment including supplemental oxygen     Tobacco abuse   Assessment & Plan    Encouraged to quit     Acute low back pain   Assessment & Plan    With fractures  On steroids for COPD  Hopefully that may also help  PT/OT and rehab as above  Essential hypertension   Assessment & Plan    Blood pressure has been labile at times especially when she has more back pain  Hypokalemia   Assessment & Plan    Replete and follow-up as needed  Ambulatory dysfunction   Assessment & Plan    Likely multifactorial including osteoporosis/DJD and now with fractures  As per patient, she has had fractures in her back-repaired before  Would consult orthopedic service as she would require back brace-being recommended by Physical therapy although not sure what type or kind  Present on Admission:   COPD exacerbation ([address] )   Ambulatory dysfunction   Hypokalemia   Essential hypertension   Acute low back pain   Tobacco abuse            VTE Pharmacologic Prophylaxis:   Pharmacologic: Heparin  Mechanical VTE Prophylaxis in Place: Yes    Patient Centered Rounds: I have performed bedside rounds with nursing staff today  Discussions with Specialists or Other Care Team Provider:  Yes    Education and Discussions with Family / Patient: yes    Time Spent for Care: 35+ minutes  More than 50% of total time spent on counseling and coordination of care as described above      Current Length of Stay: 1 day(s)    Current Patient Status: Inpatient   Certification Statement: The patient will continue to require additional inpatient hospital stay due to Pain control/placement    Discharge Plan:  Short-term rehab    Code Status: Level 1 - Full Code      Subjective:   Continues to complain of via back pain  Not comfortable  Denies any chest pain  She is chronically short of breath  On oxygen at home  Denies any abdominal pain  No fever    Objective:     Vitals:   Temp (24hrs), Av 8 °F (36 6 °C), Min:97 6 °F (36 4 °C), Max:97 9 °F (36 6 °C)    Temp:  [97 6 °F (36 4 °C)-97 9 °F (36 6 °C)] 97 9 °F (36 6 °C)  HR:  [84-91] 88  Resp:  [18-20] 20  BP: (136-172)/(73-92) 142/76  SpO2:  [96 %-97 %] 96 %  Body mass index is 21 3 kg/m²  Input and Output Summary (last 24 hours): Intake/Output Summary (Last 24 hours) at 18 1658  Last data filed at 18 1417   Gross per 24 hour   Intake              240 ml   Output              650 ml   Net             -410 ml           Physical Exam:     Vital signs are reviewed as above  Constitutional:  Uncomfortable because of back pain  Also short of breath on oxygen   Eyes: EOM grossly intact  Conjunctivae are slightly  HENT: Oropharynx are slightly dry  Neck: Neck is supple  Cardiac: I did not hear any rubs or gallop  Patient appears to be in sinus rhythm  Respiratory:  Short of breath  Bilateral coarse crackles with decreased breath sounds intensity  GI: Abdomen is soft  It is grossly nontender  Bowel sounds are audible  Neurologic:  Patient is awake and alert    She knows that she is in the hospice  Psychiatric:  Anxious  Musculoskeletal   Complaining of lot of back pain  Extremities: Patient has no significant cyanosis, clubbing, or lower extremity edema       Additional Data:     Labs:      Results from last 7 days  Lab Units 18  1307   WBC Thousand/uL  --  10 04   HEMOGLOBIN g/dL  --  11 1*   HEMATOCRIT %  --  34 1*   PLATELETS Thousands/uL 280 262   NEUTROS PCT %  --  46   LYMPHS PCT %  --  44   MONOS PCT %  --  10   EOS PCT %  --  0       Results from last 7 days  Lab Units 12/17/18  1024 12/16/18  1307   POTASSIUM mmol/L 3 5 3 1*   CHLORIDE mmol/L 101 96*   CO2 mmol/L 35* 42*   BUN mg/dL 13 11   CREATININE mg/dL 0 67 0 60   CALCIUM mg/dL 9 7 9 5   ALK PHOS U/L  --  120*   ALT U/L  --  11*   AST U/L  --  10           * I Have Reviewed All Lab Data Listed Above  * Additional Pertinent Lab Tests Reviewed: All Labs Within Last 24 Hours Reviewed    MRI/standing x-rays pending      Recent Cultures (last 7 days):           Last 24 Hours Medication List:     Current Facility-Administered Medications:  albuterol 2 puff Inhalation Q4H PRN Per Posada MD   amLODIPine 5 mg Oral Daily Per Posada MD   budesonide-formoterol 2 puff Inhalation BID Per Posada MD   carvedilol 25 mg Oral BID With Meals Per Posada MD   heparin (porcine) 5,000 Units Subcutaneous Q8H [address] Per Posada MD   HYDROcodone-acetaminophen 1 tablet Oral Q6H PRN Shirley Booth MD   HYDROmorphone 0 5 mg Intravenous Q4H PRN Ruben Alan MD   levalbuterol 1 25 mg Nebulization TID Per Posada MD   And       sodium chloride 3 mL Nebulization TID Per Posada MD   lisinopril 20 mg Oral Daily Per Posada MD   methocarbamol 500 mg Oral Q6H PRN Shira Perez MD   nicotine 1 patch Transdermal Daily Per Posada MD   nystatin 500,000 Units Swish & Swallow 4x Daily Per Posada MD   prasugrel 10 mg Oral Daily Per Posada MD   predniSONE 40 mg Oral Daily Per Posada MD   venlafaxine 150 mg Oral Daily Per Posada MD        Today, Patient Was Seen By: Neo Sanchez MD    ** Please Note: Dragon 360 Dictation voice to text software may have been used in the creation of this document   **

## 2018-12-19 PROBLEM — J96.21 ACUTE ON CHRONIC RESPIRATORY FAILURE WITH HYPOXIA (HCC): Status: ACTIVE | Noted: 2018-12-19

## 2018-12-19 PROCEDURE — 94640 AIRWAY INHALATION TREATMENT: CPT

## 2018-12-19 PROCEDURE — 99232 SBSQ HOSP IP/OBS MODERATE 35: CPT | Performed by: INTERNAL MEDICINE

## 2018-12-19 PROCEDURE — 94760 N-INVAS EAR/PLS OXIMETRY 1: CPT

## 2018-12-19 RX ADMIN — LISINOPRIL 20 MG: 20 TABLET ORAL at 08:13

## 2018-12-19 RX ADMIN — BUDESONIDE AND FORMOTEROL FUMARATE DIHYDRATE 2 PUFF: 160; 4.5 AEROSOL RESPIRATORY (INHALATION) at 17:36

## 2018-12-19 RX ADMIN — NYSTATIN 500000 UNITS: 500000 SUSPENSION ORAL at 12:30

## 2018-12-19 RX ADMIN — HYDROCODONE BITARTRATE AND ACETAMINOPHEN 1 TABLET: 5; 325 TABLET ORAL at 21:49

## 2018-12-19 RX ADMIN — ISODIUM CHLORIDE 3 ML: 0.03 SOLUTION RESPIRATORY (INHALATION) at 19:32

## 2018-12-19 RX ADMIN — NICOTINE 1 PATCH: 14 PATCH TRANSDERMAL at 08:14

## 2018-12-19 RX ADMIN — CARVEDILOL 25 MG: 12.5 TABLET, FILM COATED ORAL at 17:36

## 2018-12-19 RX ADMIN — NYSTATIN 500000 UNITS: 500000 SUSPENSION ORAL at 21:49

## 2018-12-19 RX ADMIN — HYDROMORPHONE HYDROCHLORIDE 0.5 MG: 1 INJECTION, SOLUTION INTRAMUSCULAR; INTRAVENOUS; SUBCUTANEOUS at 08:10

## 2018-12-19 RX ADMIN — ISODIUM CHLORIDE 3 ML: 0.03 SOLUTION RESPIRATORY (INHALATION) at 13:32

## 2018-12-19 RX ADMIN — HEPARIN SODIUM 5000 UNITS: 5000 INJECTION, SOLUTION INTRAVENOUS; SUBCUTANEOUS at 05:42

## 2018-12-19 RX ADMIN — PREDNISONE 40 MG: 20 TABLET ORAL at 08:13

## 2018-12-19 RX ADMIN — LEVALBUTEROL HYDROCHLORIDE 1.25 MG: 1.25 SOLUTION, CONCENTRATE RESPIRATORY (INHALATION) at 07:22

## 2018-12-19 RX ADMIN — METHOCARBAMOL 500 MG: 500 TABLET ORAL at 06:17

## 2018-12-19 RX ADMIN — HYDROCODONE BITARTRATE AND ACETAMINOPHEN 1 TABLET: 5; 325 TABLET ORAL at 12:31

## 2018-12-19 RX ADMIN — HEPARIN SODIUM 5000 UNITS: 5000 INJECTION, SOLUTION INTRAVENOUS; SUBCUTANEOUS at 21:49

## 2018-12-19 RX ADMIN — CARVEDILOL 25 MG: 12.5 TABLET, FILM COATED ORAL at 08:13

## 2018-12-19 RX ADMIN — HYDROMORPHONE HYDROCHLORIDE 0.5 MG: 1 INJECTION, SOLUTION INTRAMUSCULAR; INTRAVENOUS; SUBCUTANEOUS at 00:13

## 2018-12-19 RX ADMIN — HEPARIN SODIUM 5000 UNITS: 5000 INJECTION, SOLUTION INTRAVENOUS; SUBCUTANEOUS at 12:33

## 2018-12-19 RX ADMIN — BUDESONIDE AND FORMOTEROL FUMARATE DIHYDRATE 2 PUFF: 160; 4.5 AEROSOL RESPIRATORY (INHALATION) at 08:16

## 2018-12-19 RX ADMIN — NYSTATIN 500000 UNITS: 500000 SUSPENSION ORAL at 08:13

## 2018-12-19 RX ADMIN — ISODIUM CHLORIDE 3 ML: 0.03 SOLUTION RESPIRATORY (INHALATION) at 07:22

## 2018-12-19 RX ADMIN — HYDROCODONE BITARTRATE AND ACETAMINOPHEN 1 TABLET: 5; 325 TABLET ORAL at 06:17

## 2018-12-19 RX ADMIN — AMLODIPINE BESYLATE 5 MG: 5 TABLET ORAL at 08:13

## 2018-12-19 RX ADMIN — NYSTATIN 500000 UNITS: 500000 SUSPENSION ORAL at 17:36

## 2018-12-19 RX ADMIN — LEVALBUTEROL HYDROCHLORIDE 1.25 MG: 1.25 SOLUTION, CONCENTRATE RESPIRATORY (INHALATION) at 13:32

## 2018-12-19 RX ADMIN — PRASUGREL HYDROCHLORIDE 10 MG: 10 TABLET, FILM COATED ORAL at 08:12

## 2018-12-19 RX ADMIN — VENLAFAXINE HYDROCHLORIDE 150 MG: 150 CAPSULE, EXTENDED RELEASE ORAL at 08:16

## 2018-12-19 RX ADMIN — LEVALBUTEROL HYDROCHLORIDE 1.25 MG: 1.25 SOLUTION, CONCENTRATE RESPIRATORY (INHALATION) at 19:32

## 2018-12-19 NOTE — PHYSICAL THERAPY NOTE
Physical Therapy Cancellation Note      Pt fit for TLSO brace  Pt c TLSO donned while in bed upon PT arrival  Pt refusing PT evaluation stating, "I don't want Physical Therapy  I had Physical Therapy for my knees and they messed them up  I don't want therapy"  Pt also stated, "if I don't like this brace, it's going out the window"  Will cont to follow pt during hospital course as agreeable to participate  nsg notified       Willa Ang, PT

## 2018-12-19 NOTE — PROGRESS NOTES
[facility] Progress Note  Patient: Lisha Enriquez 68 y o  female   MRN: [id number]  PCP: Antonio Koenig MD  Unit/Bed#: -01 Encounter: [id number]  Date Of Visit: 12/19/18          * Acute on chronic respiratory failure with hypoxia ([address] )   Assessment & Plan    ch resp failure on O2 at 5 lit via nc at home  Better now  On ch steroids for about 2 years  COPD exacerbation [facility])   Assessment & Plan    Patient has chronic hypoxic respiratory [id number] and steroid dependent at home  She is on less oxygen now  Continue to maintain  She also continues to smoke  Encouraged to quit     Tobacco abuse   Assessment & Plan    Encouraged to quit     Acute low back pain   Assessment & Plan    Patient with history of previous compression fractures  Also chronically on steroids and has significant osteoporosis  Plan as above     Essential hypertension   Assessment & Plan    Continue with current treatment  Her pressure goes up especially when she gets more pain in her back     Hypokalemia   Assessment & Plan    Replete and follow-up as needed  Ambulatory dysfunction   Assessment & Plan    Significant osteoporosis  Orthopedic input appreciated  Patient have a TLSO  ? Need for kyphoplasty  Present on Admission:   COPD exacerbation ([address] )   Ambulatory dysfunction   Hypokalemia   Essential hypertension   Acute low back pain   Tobacco abuse   Acute on chronic respiratory failure with hypoxia (HCC)            VTE Pharmacologic Prophylaxis:   Pharmacologic: Heparin  Mechanical VTE Prophylaxis in Place: Yes    Patient Centered Rounds: I have performed bedside rounds with nursing staff today      Discussions with Specialists or Other Care Team Provider:  Yes    Education and Discussions with Family / Patient:  Yes      Current Length of Stay: 2 day(s)    Current Patient Status: Inpatient   Certification Statement: The patient will continue to require additional inpatient hospital stay due to Back pain/short-term rehab placement    Discharge Plan:  Short-term rehab    Code Status: Level 1 - Full Code      Subjective:   Breathing is better  She is on 5 L of oxygen at home  Currently on 3 and 0 5 L and saturating well  Continues have lower back pain although not worse  Denies any fever or chills  Awaiting a back brace to arrive  Objective:     Vitals:   Temp (24hrs), Av 6 °F (36 4 °C), Min:97 4 °F (36 3 °C), Max:97 9 °F (36 6 °C)    Temp:  [97 4 °F (36 3 °C)-97 9 °F (36 6 °C)] 97 5 °F (36 4 °C)  HR:  [88] 88  Resp:  [18-20] 18  BP: (139-188)/(76-87) 188/87  SpO2:  [96 %-100 %] 100 %  Body mass index is 21 3 kg/m²  Input and Output Summary (last 24 hours): Intake/Output Summary (Last 24 hours) at 18 1215  Last data filed at 18 0900   Gross per 24 hour   Intake              200 ml   Output              350 ml   Net             -150 ml           Physical Exam:     Vital signs are reviewed as above  Constitutional:  Lying in bed  Somewhat sleepy earlier  Eyes: EOM grossly intact    HENT: Oropharynx are slightly dry  Has oxygen  Neck: Neck is supple  Cardiac: I did not hear any rubs or gallop  Patient has stable rhythm  Respiratory: Patient is on oxygen  Has poor air entry in general    GI: Abdomen is soft  It is grossly nontender  Bowel sounds are adequate  I was not able to appreciate any hepatosplenomegaly  Neurologic:  Sleepy although easily arousable and oriented x3  Skin:  Bruising/ecchymotic areas on her forearms  Psychiatric:  Anxious/depressed  Musculoskeletal   Back pain    Has chronic arthritic joint   Extremities: Patient has no significant cyanosis, clubbing, or lower extremity edema       Additional Data:     Labs:      Results from last 7 days  Lab Units 18  1307   WBC Thousand/uL  --  10 04   HEMOGLOBIN g/dL  --  11 1*   HEMATOCRIT %  --  34 1*   PLATELETS Thousands/uL 280 262   NEUTROS PCT %  --  46   LYMPHS PCT %  -- 44   MONOS PCT %  --  10   EOS PCT %  --  0       Results from last 7 days  Lab Units 12/17/18  1024 12/16/18  1307   POTASSIUM mmol/L 3 5 3 1*   CHLORIDE mmol/L 101 96*   CO2 mmol/L 35* 42*   BUN mg/dL 13 11   CREATININE mg/dL 0 67 0 60   CALCIUM mg/dL 9 7 9 5   ALK PHOS U/L  --  120*   ALT U/L  --  11*   AST U/L  --  10           * I Have Reviewed All Lab Data Listed Above  * Additional Pertinent Lab Tests Reviewed: All Labs Within Last 24 Hours Reviewed    Imaging:    Imaging Reports Reviewed Today Include:  MRI results showing compression fracture    X-rays showing stable appearance of the compression fractures T12, L1, and L4    Recent Cultures (last 7 days):           Last 24 Hours Medication List:     Current Facility-Administered Medications:  albuterol 2 puff Inhalation Q4H PRN Venkatesh Booth MD   amLODIPine 5 mg Oral Daily Ethyl Kayser, MD   budesonide-formoterol 2 puff Inhalation BID Ethyl Kayser, MD   carvedilol 25 mg Oral BID With Meals Ethyl Kayser, MD   heparin (porcine) 5,000 Units Subcutaneous Q8H [address] Ethyl Kayser, MD   HYDROcodone-acetaminophen 1 tablet Oral Q6H PRN Jones Booth MD   HYDROmorphone 0 5 mg Intravenous Q4H PRN Herb Alan MD   levalbuterol 1 25 mg Nebulization TID Ethyl Kayser, MD   And       sodium chloride 3 mL Nebulization TID Ethyl Kayser, MD   lisinopril 20 mg Oral Daily Ethyl Kayser, MD   methocarbamol 500 mg Oral Q6H PRN Ilir Harper MD   nicotine 1 patch Transdermal Daily Ethyl Kayser, MD   nystatin 500,000 Units Swish & Swallow 4x Daily Ethyl Kayser, MD   prasugrel 10 mg Oral Daily Ethyl Kayser, MD   predniSONE 40 mg Oral Daily Ethyl Kayser, MD   venlafaxine 150 mg Oral Daily Ethyl Kayser, MD        Today, Patient Was Seen By: Arsh Quarles MD    ** Please Note: Dragon 360 Dictation voice to text software may have been used in the creation of this document   **

## 2018-12-19 NOTE — PHYSICAL THERAPY NOTE
PHYSICAL THERAPY NOTE          Patient Name: Dea LEYP Date: 12/19/2018     Call placed to [facility] for TLSO brace  Awaiting call back from rep for ETA  nsg notified       Ene Erazo PT

## 2018-12-19 NOTE — ASSESSMENT & PLAN NOTE
Patient remains with loose congestion in the upper airway  · mucinex  · Check chest xray  · Mucous clearance protocol

## 2018-12-20 PROCEDURE — 97163 PT EVAL HIGH COMPLEX 45 MIN: CPT

## 2018-12-20 PROCEDURE — 97535 SELF CARE MNGMENT TRAINING: CPT

## 2018-12-20 PROCEDURE — G8978 MOBILITY CURRENT STATUS: HCPCS

## 2018-12-20 PROCEDURE — 97530 THERAPEUTIC ACTIVITIES: CPT

## 2018-12-20 PROCEDURE — 94640 AIRWAY INHALATION TREATMENT: CPT

## 2018-12-20 PROCEDURE — 99233 SBSQ HOSP IP/OBS HIGH 50: CPT | Performed by: INTERNAL MEDICINE

## 2018-12-20 PROCEDURE — 94760 N-INVAS EAR/PLS OXIMETRY 1: CPT

## 2018-12-20 PROCEDURE — G8979 MOBILITY GOAL STATUS: HCPCS

## 2018-12-20 RX ORDER — PREDNISONE 20 MG/1
20 TABLET ORAL DAILY
Status: DISCONTINUED | OUTPATIENT
Start: 2018-12-21 | End: 2018-12-20

## 2018-12-20 RX ADMIN — CARVEDILOL 25 MG: 12.5 TABLET, FILM COATED ORAL at 08:22

## 2018-12-20 RX ADMIN — HYDROCODONE BITARTRATE AND ACETAMINOPHEN 1 TABLET: 5; 325 TABLET ORAL at 03:59

## 2018-12-20 RX ADMIN — ISODIUM CHLORIDE 3 ML: 0.03 SOLUTION RESPIRATORY (INHALATION) at 14:38

## 2018-12-20 RX ADMIN — HYDROMORPHONE HYDROCHLORIDE 0.5 MG: 1 INJECTION, SOLUTION INTRAMUSCULAR; INTRAVENOUS; SUBCUTANEOUS at 02:35

## 2018-12-20 RX ADMIN — NYSTATIN 500000 UNITS: 500000 SUSPENSION ORAL at 21:35

## 2018-12-20 RX ADMIN — HEPARIN SODIUM 5000 UNITS: 5000 INJECTION, SOLUTION INTRAVENOUS; SUBCUTANEOUS at 21:38

## 2018-12-20 RX ADMIN — ISODIUM CHLORIDE 3 ML: 0.03 SOLUTION RESPIRATORY (INHALATION) at 07:40

## 2018-12-20 RX ADMIN — ALBUTEROL SULFATE 2 PUFF: 90 AEROSOL, METERED RESPIRATORY (INHALATION) at 02:34

## 2018-12-20 RX ADMIN — LISINOPRIL 20 MG: 20 TABLET ORAL at 08:22

## 2018-12-20 RX ADMIN — VENLAFAXINE HYDROCHLORIDE 150 MG: 150 CAPSULE, EXTENDED RELEASE ORAL at 08:24

## 2018-12-20 RX ADMIN — HYDROMORPHONE HYDROCHLORIDE 0.5 MG: 1 INJECTION, SOLUTION INTRAMUSCULAR; INTRAVENOUS; SUBCUTANEOUS at 22:58

## 2018-12-20 RX ADMIN — PRASUGREL HYDROCHLORIDE 10 MG: 10 TABLET, FILM COATED ORAL at 08:22

## 2018-12-20 RX ADMIN — NYSTATIN 500000 UNITS: 500000 SUSPENSION ORAL at 13:00

## 2018-12-20 RX ADMIN — HYDROMORPHONE HYDROCHLORIDE 0.5 MG: 1 INJECTION, SOLUTION INTRAMUSCULAR; INTRAVENOUS; SUBCUTANEOUS at 07:34

## 2018-12-20 RX ADMIN — LEVALBUTEROL HYDROCHLORIDE 1.25 MG: 1.25 SOLUTION, CONCENTRATE RESPIRATORY (INHALATION) at 07:40

## 2018-12-20 RX ADMIN — NICOTINE 1 PATCH: 14 PATCH TRANSDERMAL at 08:24

## 2018-12-20 RX ADMIN — HYDROCODONE BITARTRATE AND ACETAMINOPHEN 1 TABLET: 5; 325 TABLET ORAL at 21:32

## 2018-12-20 RX ADMIN — LEVALBUTEROL HYDROCHLORIDE 1.25 MG: 1.25 SOLUTION, CONCENTRATE RESPIRATORY (INHALATION) at 14:38

## 2018-12-20 RX ADMIN — ISODIUM CHLORIDE 3 ML: 0.03 SOLUTION RESPIRATORY (INHALATION) at 19:36

## 2018-12-20 RX ADMIN — BUDESONIDE AND FORMOTEROL FUMARATE DIHYDRATE 2 PUFF: 160; 4.5 AEROSOL RESPIRATORY (INHALATION) at 18:53

## 2018-12-20 RX ADMIN — PREDNISONE 40 MG: 20 TABLET ORAL at 08:22

## 2018-12-20 RX ADMIN — NYSTATIN 500000 UNITS: 500000 SUSPENSION ORAL at 18:52

## 2018-12-20 RX ADMIN — HEPARIN SODIUM 5000 UNITS: 5000 INJECTION, SOLUTION INTRAVENOUS; SUBCUTANEOUS at 14:55

## 2018-12-20 RX ADMIN — BUDESONIDE AND FORMOTEROL FUMARATE DIHYDRATE 2 PUFF: 160; 4.5 AEROSOL RESPIRATORY (INHALATION) at 08:24

## 2018-12-20 RX ADMIN — LEVALBUTEROL HYDROCHLORIDE 1.25 MG: 1.25 SOLUTION, CONCENTRATE RESPIRATORY (INHALATION) at 19:36

## 2018-12-20 RX ADMIN — CARVEDILOL 25 MG: 12.5 TABLET, FILM COATED ORAL at 18:53

## 2018-12-20 RX ADMIN — NYSTATIN 500000 UNITS: 500000 SUSPENSION ORAL at 08:23

## 2018-12-20 RX ADMIN — HYDROCODONE BITARTRATE AND ACETAMINOPHEN 1 TABLET: 5; 325 TABLET ORAL at 11:15

## 2018-12-20 RX ADMIN — HYDROMORPHONE HYDROCHLORIDE 0.5 MG: 1 INJECTION, SOLUTION INTRAMUSCULAR; INTRAVENOUS; SUBCUTANEOUS at 13:00

## 2018-12-20 RX ADMIN — HEPARIN SODIUM 5000 UNITS: 5000 INJECTION, SOLUTION INTRAVENOUS; SUBCUTANEOUS at 05:24

## 2018-12-20 RX ADMIN — AMLODIPINE BESYLATE 5 MG: 5 TABLET ORAL at 08:22

## 2018-12-20 NOTE — OCCUPATIONAL THERAPY NOTE
OCCUPATIONAL THERAPY TREATMENT  NOTE       12/20/18 0542   Restrictions/Precautions   Weight Bearing Precautions Per Order No   Braces or Orthoses TLSO  (brace on when OOB)   Other Precautions Chair Alarm; Bed Alarm;Multiple lines;O2;Fall Risk;Spinal precautions   Bed Mobility   Rolling L 4  Minimal assistance  (cues for spinal precautions)   Additional items Assist x 1;Bedrails; Increased time required;Verbal cues   Supine to Sit 5  Supervision   Additional items Assist x 1;Bedrails; Increased time required;Verbal cues   Sit to Supine 4  Minimal assistance   Additional items Assist x 1;HOB elevated; Bedrails; Increased time required;Verbal cues;LE management   Transfers   Sit to Stand 4  Minimal assistance  (Min A to don TLSO prior to OOB mobility)   Additional items Assist x 1;Bedrails;Armrests; Increased time required;Verbal cues   Stand to Sit 4  Minimal assistance   Additional items Assist x 1;Bedrails;Armrests; Increased time required;Verbal cues   Functional Mobility   Functional Mobility 4  Minimal assistance   Additional Comments (CGA  with RW)   Additional items Rolling walker   Cognition   Overall Cognitive Status WFL   Arousal/Participation Alert; Cooperative   Attention Within functional limits   Orientation Level Oriented X4   Memory Within functional limits   Following Commands Follows all commands and directions without difficulty   Additional Activities   Additional Activities Other (Comment)   Additional Activities Comments Pt ed on HS awareness/ recommendations   Activity Tolerance   Activity Tolerance Patient tolerated treatment well   Assessment   Assessment Pt cooperative and agreeable to skilled OT services with focus on improving safety during functional mobility and self care skills  Pt supine in bed upon start of session with daughter present during start of session, however daughter left shortly after session started    Pt roll > Lside > supine with min A and cues for technique  Pt able to don TLSO brace when seated on EOB with min A  Therapist reviewed need for TLSO brace with pt  providing reinforcement for safety precautions  Pt sit>stand at [facility] with GCA during ambulation  Stand <>sit req  Cues for hand placement  Education provided on home safety awareness with therapist recommending shower bench for use in home bathtub rather than currently used shower chair  Pt states that she had grab bars installed  Therapist ed  pt on use of AE for LB ADL to maintain back precaution with pt demonstrating good understanding of  material presesented  Pt demonstrated increased activity tolerance requiring fewer rest breaks during presented tasks  Pt performance demonstrated improving carryover of learned techniques and strategies to facilitate safety during self care and daily routine, however pt continues to demonstrate deficits in the areas of self care and functional mobility  Pt would continue to benefit from skilled OT POC to facilitate return to PLOF  Plan   Treatment Interventions ADL retraining;Visual perceptual retraining;Functional transfer training;UE strengthening/ROM; Endurance training;Patient/family training;Equipment evaluation/education; Fine motor coordination activities; Compensatory technique education;Continued evaluation; Energy conservation; Activityengagement   Goal Expiration Date 12/31/18   OT Frequency 3-5x/wk   Recommendation   OT Discharge Recommendation Short Term Rehab                                                       SANJU Evans/CEDRICK

## 2018-12-20 NOTE — PLAN OF CARE
Problem: OCCUPATIONAL THERAPY ADULT  Goal: Performs self-care activities at highest level of function for planned discharge setting  See evaluation for individualized goals  Treatment Interventions: ADL retraining, Functional transfer training, UE strengthening/ROM, Endurance training, Patient/family training, Equipment evaluation/education, Fine motor coordination activities, Compensatory technique education, Continued evaluation, Energy conservation, Cardiac education          See flowsheet documentation for full assessment, interventions and recommendations  Outcome: Progressing  Limitation: Decreased ADL status, Decreased UE strength, Decreased endurance, Decreased fine motor control, Decreased self-care trans, Decreased high-level ADLs  Prognosis: Good  Assessment: Pt cooperative and agreeable to skilled OT services with focus on improving safety during functional mobility and self care skills  Pt supine in bed upon start of session with daughter present during start of session, however daughter left shortly after session started    Pt roll > Lside > supine with min A and cues for technique  Pt able to don TLSO brace when seated on EOB with min A  Therapist reviewed need for TLSO brace with pt  providing reinforcement for safety precautions  Pt sit>stand at [facility] with GCA during ambulation  Stand <>sit req  Cues for hand placement  Education provided on home safety awareness with therapist recommending shower bench for use in home bathtub rather than currently used shower chair  Pt states that she had grab bars installed  Therapist ed  pt on use of AE for LB ADL to maintain back precaution with pt demonstrating good understanding of  material presesented  Pt demonstrated increased activity tolerance requiring fewer rest breaks during presented tasks   Pt performance demonstrated improving carryover of learned techniques and strategies to facilitate safety during self care and daily routine, however pt continues to demonstrate deficits in the areas of self care and functional mobility  Pt would continue to benefit from skilled OT POC to facilitate return to PLOF        OT Discharge Recommendation: Short Term Rehab

## 2018-12-20 NOTE — PHYSICAL THERAPY NOTE
PT Evaluation (16min)  (11:19-11:35)    Past Medical History:   Diagnosis Date    COPD (chronic obstructive pulmonary disease) (HCC)     Hyperlipidemia     Hypertension     Renal disorder     benign kidney tumor      12/20/18 1119   Note Type   Note type Eval only   Pain Assessment   Pain Assessment 0-10   Pain Score 8   Pain Type Chronic pain   Pain Location Back   Hospital Pain Intervention(s) Ambulation/increased activity   Home Living   Type of [address] One level;Stairs to enter with rails  (4 ADELINA)   Bathroom Shower/Tub Tub/shower unit   Bathroom Toilet Standard   Bathroom Equipment Grab bars in [facility] Other (Comment)  (rollator; home O2 (5L cont))   Prior Function   Level of [place] Independent with ADLs and functional mobility  (ambulates c rollator)   Lives With Alone   Receives Help From Friend(s); Family   ADL Assistance Independent   IADLs Independent   Falls in the last 6 months 0   Vocational Retired   Comments (+) drives   Restrictions/Precautions   Braces or Orthoses TLSO  (when OOB)   Other Precautions Bed Alarm; Chair Alarm;Multiple lines;Spinal precautions; Fall Risk;O2   General   Additional Pertinent History pt presents to [facility] c COPD exacerbation + acute low back pain  imaging noted acute vs subacute T12, L1 + L4 compression fxs  per ortho, pt to be WBAT B/L LE c TLSO brace when OOB  PT consulted for mobility + d/c planning  up c (A)  Family/Caregiver Present No   Cognition   Orientation Level Oriented X4   RUE Assessment   RUE Assessment X  (3/5)   LUE Assessment   LUE Assessment X  (3/5)   RLE Assessment   RLE Assessment WFL  (4-/5)   LLE Assessment   LLE Assessment WFL  (4-/5)   Coordination   Sensation WFL   Bed Mobility   Supine to Sit Unable to assess  (pt sitting EOB upon arrival )   Additional Comments edcuated on back safety precautions c bed mobility (log roll) c good verbal understanding     Transfers   Sit to Stand 4  Minimal assistance Additional items Assist x 1;Verbal cues; Increased time required   Stand to Sit 4  Minimal assistance   Additional items Assist x 1;Verbal cues; Increased time required   Ambulation/Elevation   Gait pattern Forward Flexion;Decreased foot clearance; Short stride; Antalgic   Gait Assistance 4  Minimal assist   Additional items Assist x 1;Verbal cues   Assistive Device Rolling walker   Distance 45'   Balance   Static Sitting Good   Dynamic Sitting Good   Static Standing Fair   Dynamic Standing Fair -   Ambulatory Poor +   Activity Tolerance   Activity Tolerance Patient limited by fatigue;Patient limited by pain   Nurse Made Aware Simin   Assessment   Prognosis Good   Problem List Decreased strength;Decreased endurance; Impaired balance;Decreased mobility;Orthopedic restrictions;Pain   Assessment pt is a 72y/o f who presents to [facility] c COPD exacerbation + acute low back pain  imaging noted acute vs subacute T12, L1 + L4 compression fxs  per ortho, pt to be WBAT B/L LE c TLSO brace when OOB  PMH significant for COPD, acute low back pain, tobacco abuse, + HTN  at baseline, pt mod (I) c functional mobility c rollator  resides alone in ranch style home c 4 ADELINA  currently requires min (A)x1 to safely perform mobility tasks 2* deficits in strength, balance, gait quality, pain, spine precautions, + activity tolerance noted in PT exam above  Barthel Index 30/100  pt sitting EOB upon arrival c TLSO donned  pt educated on back safety precautions c bed mobility via log roll technique c good verbal understanding  ambulated 39' c RW c min verbal cues for safe technique  declined sitting OOB in chair at this time 2* discomfort  pt seated comfortably at EOB c bed alarm activated  would benefit from skilled PT to maximize functional mobility + improve quality of life  upon d/c, recommend STR  PT eval of high complexity 2* unstable med status c pt requiring ongoing medical management 2* acute low back pain + COPD exacerbation   pt educated on spinal precautions; now has TLSO to be worn when OOB  pt lives alone in ranch home c 4 ADELINA  (I) at baseline  Barriers to Discharge Inaccessible home environment;Decreased caregiver support   Goals   Patient Goals "to go home and take care of myself"  STG Expiration Date 12/30/18   Short Term Goal #1 1  increase strength 1/2 grade to improve overall functional mobility, 2  perform bed mobility mod (I) c log roll technique 100% of the time to decrease caregiver burden, 3  perform transfers mod (I) to safely perform ADLs, 4  ambulate 150' mod (I) c RW to safely navigate home environment, 5  negotiate 4 stairs mod (I) to safely enter home   Plan   Treatment/Interventions Functional transfer training;LE strengthening/ROM; Elevations; Therapeutic exercise; Endurance training;Patient/family training;Equipment eval/education; Bed mobility;Gait training;Spoke to nursing;Spoke to case management;Spoke to MD   PT Frequency Other (Comment)  (3-5x/wk)   Recommendation   Recommendation Short-term skilled PT   PT - OK to Discharge Yes  (to STR )   Barthel Index   Feeding 5   Bathing 0   Grooming Score 0   Dressing Score 5   Bladder Score 5   Bowels Score 5   Toilet Use Score 5   Transfers (Bed/Chair) Score 5   Mobility (Level Surface) Score 0   Stairs Score 0   Barthel Index Score 30     Adina Singh, PT

## 2018-12-20 NOTE — PROGRESS NOTES
[facility] Progress Note  Patient: Stephanie Yi 68 y o  female   MRN: [id number]  PCP: Avis Juarez MD  Unit/Bed#: -01 Encounter: [id number]  Date Of Visit: 12/20/18          * Acute on chronic respiratory failure with hypoxia [facility])   Assessment & Plan    Slowly improving and appears to be very much at her baseline now  COPD exacerbation ([facility] [address] [address] )   Assessment & Plan    Continue with current treatment     Tobacco abuse   Assessment & Plan    Encouraged to quit     Acute low back pain   Assessment & Plan    With acute fractures in addition to chronic issues/fractures and significant osteoporosis  Back brace as above  Short-term rehab hopefully soon     Essential hypertension   Assessment & Plan    Continue with current treatment  Her pressure goes up especially when she gets more pain in her back     Ambulatory dysfunction   Assessment & Plan    Patient with back pain  Back brace has arrived  Discussed with physical therapy  She was refusing to work with physical therapy before  I encouraged her to go to short-term rehab  She later on wore the brace and work with PT  Present on Admission:   COPD exacerbation ([facility] [address] )   Ambulatory dysfunction   Acute low back pain   Tobacco abuse   Acute on chronic respiratory failure with hypoxia (HCC)            VTE Pharmacologic Prophylaxis:   Pharmacologic: Heparin  Mechanical VTE Prophylaxis in Place: Yes    Patient Centered Rounds: I have performed bedside rounds with nursing staff today  Discussions with Specialists or Other Care Team Provider:  Yes    Education and Discussions with Family / Patient:  Yes    Time Spent for Care: 35+ minutes  More than 50% of total time spent on counseling and coordination of care as described above      Current Length of Stay: 3 day(s)    Current Patient Status: Inpatient   Certification Statement: The patient will continue to require additional inpatient hospital stay due to Back pain/short-term rehab    Discharge Plan:  Short-term rehab hopefully soon    Code Status: Level 1 - Full Code      Subjective:   Complaining of back pain/spasm  No nausea or vomiting  No fever or chills  She is worried that if she goes to rehab, her condition may get worse  After lengthy discussion, able to convince her to work with PT and go to short-term rehab  Breathing is better and stable    Objective:     Vitals:   Temp (24hrs), Av 9 °F (36 6 °C), Min:97 5 °F (36 4 °C), Max:98 3 °F (36 8 °C)    Temp:  [97 5 °F (36 4 °C)-98 3 °F (36 8 °C)] 97 5 °F (36 4 °C)  HR:  [] 91  Resp:  [18-22] 20  BP: (120-194)/(66-88) 194/88  SpO2:  [95 %-99 %] 98 %  Body mass index is 21 3 kg/m²  Input and Output Summary (last 24 hours): Intake/Output Summary (Last 24 hours) at 18 1217  Last data filed at 18 1115   Gross per 24 hour   Intake              360 ml   Output                0 ml   Net              360 ml           Physical Exam:     Vital signs reviewed as above  In bed  On oxygen  Chronically short of breath  Poor air entry in general with few rhonchi/crackles on auscultation  Awake and alert  Oriented x3  Abdomen is soft  Nontender  Bowel sounds are audible  Bruising on the arms as noticed  Later on as I saw her 2nd time, she was able to sit up in the bed and had her back brace on  Got quite upset about the back pain/spasms  Also questioning about surgery  Has chronic back pain in addition to acute now and also chronic arthritic joints  Lips slightly dry  S1 and S2 audible    In sinus rhythm    Additional Data:     Labs:      Results from last 7 days  Lab Units 18  2015 18  1307   WBC Thousand/uL  --  10 04   HEMOGLOBIN g/dL  --  11 1*   HEMATOCRIT %  --  34 1*   PLATELETS Thousands/uL 280 262   NEUTROS PCT %  --  46   LYMPHS PCT %  --  44   MONOS PCT %  --  10   EOS PCT %  --  0       Results from last 7 days  Lab Units 18  1024 18  1307   POTASSIUM mmol/L 3 5 3 1*   CHLORIDE mmol/L 101 96*   CO2 mmol/L 35* 42*   BUN mg/dL 13 11   CREATININE mg/dL 0 67 0 60   CALCIUM mg/dL 9 7 9 5   ALK PHOS U/L  --  120*   ALT U/L  --  11*   AST U/L  --  10           * I Have Reviewed All Lab Data Listed Above  * Additional Pertinent Lab Tests Reviewed: All Labs Within Last 24 Hours Reviewed      Recent Cultures (last 7 days):           Last 24 Hours Medication List:     Current Facility-Administered Medications:  albuterol 2 puff Inhalation Q4H PRN Jessika Lopez MD   amLODIPine 5 mg Oral Daily Jessika Lopez MD   budesonide-formoterol 2 puff Inhalation BID Jessika Lopez MD   carvedilol 25 mg Oral BID With Meals Jessika Lopez MD   heparin (porcine) 5,000 Units Subcutaneous Q8H [facility] Jessika Lopez MD   HYDROcodone-acetaminophen 1 tablet Oral Q6H PRN Tayler Booth MD   HYDROmorphone 0 5 mg Intravenous Q4H PRN Car Merrill Alan MD   levalbuterol 1 25 mg Nebulization TID Jessika Lopez MD   And       sodium chloride 3 mL Nebulization TID Jessika Lopez MD   lisinopril 20 mg Oral Daily Jessika Lopez MD   methocarbamol 500 mg Oral Q6H PRN Irais Fragoso MD   nicotine 1 patch Transdermal Daily Jessika Lopez MD   nystatin 500,000 Units Swish & Swallow 4x Daily Jessika Lopez MD   prasugrel 10 mg Oral Daily Jessika Lopez MD   predniSONE 40 mg Oral Daily Jessika Lopez MD   venlafaxine 150 mg Oral Daily Jessika Lopez MD        Today, Patient Was Seen By: Nathan Prieto MD    ** Please Note: Dragon 360 Dictation voice to text software may have been used in the creation of this document   **

## 2018-12-20 NOTE — PLAN OF CARE
Problem: PHYSICAL THERAPY ADULT  Goal: Performs mobility at highest level of function for planned discharge setting  See evaluation for individualized goals  Treatment/Interventions: Functional transfer training, LE strengthening/ROM, Elevations, Therapeutic exercise, Endurance training, Patient/family training, Equipment eval/education, Bed mobility, Gait training, Spoke to nursing, Spoke to case management, Spoke to MD          See flowsheet documentation for full assessment, interventions and recommendations  Prognosis: Good  Problem List: Decreased strength, Decreased endurance, Impaired balance, Decreased mobility, Orthopedic restrictions, Pain  Assessment: pt is a 72y/o f who presents to [facility] c COPD exacerbation + acute low back pain  imaging noted acute vs subacute T12, L1 + L4 compression fxs  per ortho, pt to be WBAT B/L LE c TLSO brace when OOB  PMH significant for COPD, acute low back pain, tobacco abuse, + HTN  at baseline, pt mod (I) c functional mobility c rollator  resides alone in ranch style home c 4 ADELINA  currently requires min (A)x1 to safely perform mobility tasks 2* deficits in strength, balance, gait quality, pain, spine precautions, + activity tolerance noted in PT exam above  Barthel Index 30/100  pt sitting EOB upon arrival c TLSO donned  pt educated on back safety precautions c bed mobility via log roll technique c good verbal understanding  ambulated 39' c RW c min verbal cues for safe technique  declined sitting OOB in chair at this time 2* discomfort  pt seated comfortably at EOB c bed alarm activated  would benefit from skilled PT to maximize functional mobility + improve quality of life  upon d/c, recommend STR  PT eval of high complexity 2* unstable med status c pt requiring ongoing medical management 2* acute low back pain + COPD exacerbation  pt educated on spinal precautions; now has TLSO to be worn when OOB  pt lives alone in ranch home c 4 ADELINA  (I) at baseline    Barriers to Discharge: Inaccessible home environment, Decreased caregiver support     Recommendation: Short-term skilled PT     PT - OK to Discharge: Yes (to STR )    See flowsheet documentation for full assessment

## 2018-12-21 ENCOUNTER — APPOINTMENT (INPATIENT)
Dept: RADIOLOGY | Facility: HOSPITAL | Age: 73
DRG: 542 | End: 2018-12-21
Payer: MEDICARE

## 2018-12-21 PROBLEM — I25.10 CAD (CORONARY ARTERY DISEASE), NATIVE CORONARY ARTERY: Status: ACTIVE | Noted: 2018-12-21

## 2018-12-21 LAB
ANION GAP SERPL CALCULATED.3IONS-SCNC: 7 MMOL/L (ref 4–13)
BASOPHILS # BLD AUTO: 0.03 THOUSANDS/ΜL (ref 0–0.1)
BASOPHILS NFR BLD AUTO: 0 % (ref 0–1)
BUN SERPL-MCNC: 18 MG/DL (ref 5–25)
CALCIUM SERPL-MCNC: 9.4 MG/DL (ref 8.3–10.1)
CHLORIDE SERPL-SCNC: 103 MMOL/L (ref 100–108)
CO2 SERPL-SCNC: 35 MMOL/L (ref 21–32)
CREAT SERPL-MCNC: 0.79 MG/DL (ref 0.6–1.3)
EOSINOPHIL # BLD AUTO: 0.02 THOUSAND/ΜL (ref 0–0.61)
EOSINOPHIL NFR BLD AUTO: 0 % (ref 0–6)
ERYTHROCYTE [DISTWIDTH] IN BLOOD BY AUTOMATED COUNT: 13.6 % (ref 11.6–15.1)
GFR SERPL CREATININE-BSD FRML MDRD: 75 ML/MIN/1.73SQ M
GLUCOSE SERPL-MCNC: 102 MG/DL (ref 65–140)
HCT VFR BLD AUTO: 38.6 % (ref 34.8–46.1)
HGB BLD-MCNC: 12.2 G/DL (ref 11.5–15.4)
IMM GRANULOCYTES # BLD AUTO: 0.24 THOUSAND/UL (ref 0–0.2)
IMM GRANULOCYTES NFR BLD AUTO: 2 % (ref 0–2)
LYMPHOCYTES # BLD AUTO: 5.72 THOUSANDS/ΜL (ref 0.6–4.47)
LYMPHOCYTES NFR BLD AUTO: 47 % (ref 14–44)
MCH RBC QN AUTO: 30.7 PG (ref 26.8–34.3)
MCHC RBC AUTO-ENTMCNC: 31.6 G/DL (ref 31.4–37.4)
MCV RBC AUTO: 97 FL (ref 82–98)
MONOCYTES # BLD AUTO: 0.61 THOUSAND/ΜL (ref 0.17–1.22)
MONOCYTES NFR BLD AUTO: 5 % (ref 4–12)
NEUTROPHILS # BLD AUTO: 5.57 THOUSANDS/ΜL (ref 1.85–7.62)
NEUTS SEG NFR BLD AUTO: 46 % (ref 43–75)
NRBC BLD AUTO-RTO: 0 /100 WBCS
PLATELET # BLD AUTO: 323 THOUSANDS/UL (ref 149–390)
PMV BLD AUTO: 9.1 FL (ref 8.9–12.7)
POTASSIUM SERPL-SCNC: 2.8 MMOL/L (ref 3.5–5.3)
RBC # BLD AUTO: 3.98 MILLION/UL (ref 3.81–5.12)
SODIUM SERPL-SCNC: 145 MMOL/L (ref 136–145)
WBC # BLD AUTO: 12.19 THOUSAND/UL (ref 4.31–10.16)

## 2018-12-21 PROCEDURE — 94640 AIRWAY INHALATION TREATMENT: CPT

## 2018-12-21 PROCEDURE — 99232 SBSQ HOSP IP/OBS MODERATE 35: CPT | Performed by: INTERNAL MEDICINE

## 2018-12-21 PROCEDURE — 94664 DEMO&/EVAL PT USE INHALER: CPT

## 2018-12-21 PROCEDURE — 71046 X-RAY EXAM CHEST 2 VIEWS: CPT

## 2018-12-21 PROCEDURE — 94760 N-INVAS EAR/PLS OXIMETRY 1: CPT

## 2018-12-21 PROCEDURE — 94668 MNPJ CHEST WALL SBSQ: CPT

## 2018-12-21 PROCEDURE — 85025 COMPLETE CBC W/AUTO DIFF WBC: CPT | Performed by: INTERNAL MEDICINE

## 2018-12-21 PROCEDURE — 80048 BASIC METABOLIC PNL TOTAL CA: CPT | Performed by: INTERNAL MEDICINE

## 2018-12-21 RX ORDER — HYDROCODONE BITARTRATE AND ACETAMINOPHEN 5; 325 MG/1; MG/1
1 TABLET ORAL EVERY 4 HOURS PRN
Status: DISCONTINUED | OUTPATIENT
Start: 2018-12-21 | End: 2018-12-23 | Stop reason: HOSPADM

## 2018-12-21 RX ORDER — GUAIFENESIN 600 MG
600 TABLET, EXTENDED RELEASE 12 HR ORAL 2 TIMES DAILY
Status: DISCONTINUED | OUTPATIENT
Start: 2018-12-21 | End: 2018-12-23 | Stop reason: HOSPADM

## 2018-12-21 RX ORDER — HYDROMORPHONE HCL/PF 1 MG/ML
0.2 SYRINGE (ML) INJECTION EVERY 4 HOURS PRN
Status: DISCONTINUED | OUTPATIENT
Start: 2018-12-21 | End: 2018-12-22

## 2018-12-21 RX ORDER — POTASSIUM CHLORIDE 20 MEQ/1
40 TABLET, EXTENDED RELEASE ORAL 2 TIMES DAILY
Status: COMPLETED | OUTPATIENT
Start: 2018-12-21 | End: 2018-12-22

## 2018-12-21 RX ADMIN — HYDROCODONE BITARTRATE AND ACETAMINOPHEN 1 TABLET: 5; 325 TABLET ORAL at 21:08

## 2018-12-21 RX ADMIN — ISODIUM CHLORIDE 3 ML: 0.03 SOLUTION RESPIRATORY (INHALATION) at 07:26

## 2018-12-21 RX ADMIN — HYDROMORPHONE HYDROCHLORIDE 0.5 MG: 1 INJECTION, SOLUTION INTRAMUSCULAR; INTRAVENOUS; SUBCUTANEOUS at 03:01

## 2018-12-21 RX ADMIN — LISINOPRIL 20 MG: 20 TABLET ORAL at 10:20

## 2018-12-21 RX ADMIN — HYDROMORPHONE HYDROCHLORIDE 0.2 MG: 1 INJECTION, SOLUTION INTRAMUSCULAR; INTRAVENOUS; SUBCUTANEOUS at 18:30

## 2018-12-21 RX ADMIN — BUDESONIDE AND FORMOTEROL FUMARATE DIHYDRATE 2 PUFF: 160; 4.5 AEROSOL RESPIRATORY (INHALATION) at 10:21

## 2018-12-21 RX ADMIN — PRASUGREL HYDROCHLORIDE 10 MG: 10 TABLET, FILM COATED ORAL at 10:18

## 2018-12-21 RX ADMIN — METHOCARBAMOL 500 MG: 500 TABLET ORAL at 00:49

## 2018-12-21 RX ADMIN — NYSTATIN 500000 UNITS: 500000 SUSPENSION ORAL at 13:35

## 2018-12-21 RX ADMIN — AMLODIPINE BESYLATE 5 MG: 5 TABLET ORAL at 10:17

## 2018-12-21 RX ADMIN — NYSTATIN 500000 UNITS: 500000 SUSPENSION ORAL at 10:20

## 2018-12-21 RX ADMIN — ISODIUM CHLORIDE 3 ML: 0.03 SOLUTION RESPIRATORY (INHALATION) at 13:06

## 2018-12-21 RX ADMIN — METHOCARBAMOL 500 MG: 500 TABLET ORAL at 21:08

## 2018-12-21 RX ADMIN — LEVALBUTEROL HYDROCHLORIDE 1.25 MG: 1.25 SOLUTION, CONCENTRATE RESPIRATORY (INHALATION) at 07:26

## 2018-12-21 RX ADMIN — HYDROMORPHONE HYDROCHLORIDE 0.5 MG: 1 INJECTION, SOLUTION INTRAMUSCULAR; INTRAVENOUS; SUBCUTANEOUS at 07:43

## 2018-12-21 RX ADMIN — LEVALBUTEROL HYDROCHLORIDE 1.25 MG: 1.25 SOLUTION, CONCENTRATE RESPIRATORY (INHALATION) at 19:43

## 2018-12-21 RX ADMIN — BUDESONIDE AND FORMOTEROL FUMARATE DIHYDRATE 2 PUFF: 160; 4.5 AEROSOL RESPIRATORY (INHALATION) at 18:31

## 2018-12-21 RX ADMIN — LEVALBUTEROL HYDROCHLORIDE 1.25 MG: 1.25 SOLUTION, CONCENTRATE RESPIRATORY (INHALATION) at 13:06

## 2018-12-21 RX ADMIN — VENLAFAXINE HYDROCHLORIDE 150 MG: 150 CAPSULE, EXTENDED RELEASE ORAL at 10:17

## 2018-12-21 RX ADMIN — ISODIUM CHLORIDE 3 ML: 0.03 SOLUTION RESPIRATORY (INHALATION) at 19:43

## 2018-12-21 RX ADMIN — HEPARIN SODIUM 5000 UNITS: 5000 INJECTION, SOLUTION INTRAVENOUS; SUBCUTANEOUS at 13:45

## 2018-12-21 RX ADMIN — CARVEDILOL 25 MG: 12.5 TABLET, FILM COATED ORAL at 10:17

## 2018-12-21 RX ADMIN — CARVEDILOL 25 MG: 12.5 TABLET, FILM COATED ORAL at 16:40

## 2018-12-21 RX ADMIN — PREDNISONE 30 MG: 20 TABLET ORAL at 10:17

## 2018-12-21 RX ADMIN — POTASSIUM CHLORIDE 40 MEQ: 1500 TABLET, EXTENDED RELEASE ORAL at 18:30

## 2018-12-21 RX ADMIN — NICOTINE 1 PATCH: 14 PATCH TRANSDERMAL at 10:21

## 2018-12-21 RX ADMIN — HYDROMORPHONE HYDROCHLORIDE 0.5 MG: 1 INJECTION, SOLUTION INTRAMUSCULAR; INTRAVENOUS; SUBCUTANEOUS at 13:45

## 2018-12-21 RX ADMIN — HYDROMORPHONE HYDROCHLORIDE 0.2 MG: 1 INJECTION, SOLUTION INTRAMUSCULAR; INTRAVENOUS; SUBCUTANEOUS at 22:42

## 2018-12-21 RX ADMIN — HEPARIN SODIUM 5000 UNITS: 5000 INJECTION, SOLUTION INTRAVENOUS; SUBCUTANEOUS at 21:08

## 2018-12-21 RX ADMIN — NYSTATIN 500000 UNITS: 500000 SUSPENSION ORAL at 18:30

## 2018-12-21 RX ADMIN — NYSTATIN 500000 UNITS: 500000 SUSPENSION ORAL at 21:08

## 2018-12-21 RX ADMIN — HYDROCODONE BITARTRATE AND ACETAMINOPHEN 1 TABLET: 5; 325 TABLET ORAL at 10:43

## 2018-12-21 RX ADMIN — GUAIFENESIN 600 MG: 600 TABLET, EXTENDED RELEASE ORAL at 18:31

## 2018-12-21 NOTE — RESPIRATORY THERAPY NOTE
RT Protocol Note  Jan Mt 68 y o  female MRN: [id number]  Unit/Bed#: -01 Encounter: [id number]    Assessment    Principal Problem:    Acute on chronic respiratory failure with hypoxia ([facility] )  Active Problems:    COPD exacerbation ([facility] )    Ambulatory dysfunction    Acute low back pain    Tobacco abuse      Home Pulmonary Medications:  Home Devices/Therapy: Home O2    Past Medical History:   Diagnosis Date    COPD (chronic obstructive pulmonary disease) ([facility] )     Hyperlipidemia     Hypertension     Renal disorder     benign kidney tumor     Social History     Social History    Marital status:      Spouse name: N/A    Number of children: N/A    Years of education: N/A     Social History Main Topics    Smoking status: Current Every Day Smoker    Smokeless tobacco: Never Used    Alcohol use No    Drug use: No    Sexual activity: Not Asked     Other Topics Concern    None     Social History Narrative    None       Subjective         Objective    Physical Exam:   Assessment Type: Assess only  General Appearance: Awake, Alert  Respiratory Pattern: Dyspnea with exertion  Chest Assessment: Chest expansion symmetrical  Bilateral Breath Sounds: Diminished, Coarse, Expiratory wheezes    Vitals:  Blood pressure 121/61, pulse 77, temperature 97 8 °F (36 6 °C), temperature source Oral, resp  rate 18, height 5' 7" (1 702 m), weight 61 7 kg (136 lb), SpO2 95 %, not currently breastfeeding  Imaging and other studies: I have personally reviewed pertinent reports  Plan    Respiratory Plan: Mild Distress pathway  Airway Clearance Plan: Discontinue Protocol     Resp Comments: Airway clearance protocol completed at this time  Pt's goal on incentive spirometer is 616mL; pt reached 1000mL 10 times  Encouraged pt to use incentive spirometer 10 times per hour  Pt also stated she has a acapella at home but would also like another one here because it helps her cough up mucus   Pt is a current smoker and stated she wants a cigarette  Discontinue airway clearance protocol at this time; will give pt acapella

## 2018-12-21 NOTE — UTILIZATION REVIEW
Continued Stay Review    Date: 12/21    Vital Signs: /88 (BP Location: Left arm)   Pulse 96   Temp (!) 97 3 °F (36 3 °C) (Axillary)   Resp 18   Ht 5' 7" (1 702 m)   Wt 61 7 kg (136 lb)   SpO2 99%   BMI 21 30 kg/m²     Medications:   Scheduled Meds:   Current Facility-Administered Medications:  albuterol 2 puff Inhalation Q4H PRN    amLODIPine 5 mg Oral Daily    budesonide-formoterol 2 puff Inhalation BID    carvedilol 25 mg Oral BID With Meals    heparin (porcine) 5,000 Units Subcutaneous Q8H [address]    HYDROcodone-acetaminophen 1 tablet Oral Q6H PRN x1   HYDROmorphone 0 5 mg Intravenous Q4H PRN x2   levalbuterol 1 25 mg Nebulization TID    And       sodium chloride 3 mL Nebulization TID    lisinopril 20 mg Oral Daily    methocarbamol 500 mg Oral Q6H PRN    nicotine 1 patch Transdermal Daily    nystatin 500,000 Units Swish & Swallow 4x Daily    prasugrel 10 mg Oral Daily    predniSONE 30 mg Oral Daily    venlafaxine 150 mg Oral Daily        Abnormal Labs/Diagnostic Results: K   2 8, co2 35, wbc  12 19    Age/Sex: 68 y o  female     Assessment/Plan: 67 yo female admitted w/ resp failure is slowly improving and back to baseline , cont on NC w/ sats in high 90's    Cont w/ amb dysfunction , back braces has arrived is working with PT and placement to STR

## 2018-12-22 LAB
ANION GAP SERPL CALCULATED.3IONS-SCNC: 5 MMOL/L (ref 4–13)
BUN SERPL-MCNC: 22 MG/DL (ref 5–25)
CALCIUM SERPL-MCNC: 9.2 MG/DL (ref 8.3–10.1)
CHLORIDE SERPL-SCNC: 105 MMOL/L (ref 100–108)
CO2 SERPL-SCNC: 34 MMOL/L (ref 21–32)
CREAT SERPL-MCNC: 0.68 MG/DL (ref 0.6–1.3)
ERYTHROCYTE [DISTWIDTH] IN BLOOD BY AUTOMATED COUNT: 13.8 % (ref 11.6–15.1)
GFR SERPL CREATININE-BSD FRML MDRD: 88 ML/MIN/1.73SQ M
GLUCOSE SERPL-MCNC: 100 MG/DL (ref 65–140)
HCT VFR BLD AUTO: 33.9 % (ref 34.8–46.1)
HGB BLD-MCNC: 10.8 G/DL (ref 11.5–15.4)
MCH RBC QN AUTO: 31.4 PG (ref 26.8–34.3)
MCHC RBC AUTO-ENTMCNC: 31.9 G/DL (ref 31.4–37.4)
MCV RBC AUTO: 99 FL (ref 82–98)
PLATELET # BLD AUTO: 272 THOUSANDS/UL (ref 149–390)
PMV BLD AUTO: 9.3 FL (ref 8.9–12.7)
POTASSIUM SERPL-SCNC: 3.8 MMOL/L (ref 3.5–5.3)
RBC # BLD AUTO: 3.44 MILLION/UL (ref 3.81–5.12)
SODIUM SERPL-SCNC: 144 MMOL/L (ref 136–145)
WBC # BLD AUTO: 12.22 THOUSAND/UL (ref 4.31–10.16)

## 2018-12-22 PROCEDURE — 85027 COMPLETE CBC AUTOMATED: CPT | Performed by: INTERNAL MEDICINE

## 2018-12-22 PROCEDURE — 80048 BASIC METABOLIC PNL TOTAL CA: CPT | Performed by: INTERNAL MEDICINE

## 2018-12-22 PROCEDURE — 94640 AIRWAY INHALATION TREATMENT: CPT

## 2018-12-22 PROCEDURE — 94668 MNPJ CHEST WALL SBSQ: CPT

## 2018-12-22 PROCEDURE — 94760 N-INVAS EAR/PLS OXIMETRY 1: CPT

## 2018-12-22 PROCEDURE — 99232 SBSQ HOSP IP/OBS MODERATE 35: CPT | Performed by: INTERNAL MEDICINE

## 2018-12-22 RX ORDER — HYDROMORPHONE HCL/PF 1 MG/ML
0.2 SYRINGE (ML) INJECTION EVERY 8 HOURS PRN
Status: DISCONTINUED | OUTPATIENT
Start: 2018-12-22 | End: 2018-12-23 | Stop reason: HOSPADM

## 2018-12-22 RX ORDER — LIDOCAINE 50 MG/G
1 PATCH TOPICAL DAILY
Status: DISCONTINUED | OUTPATIENT
Start: 2018-12-22 | End: 2018-12-23 | Stop reason: HOSPADM

## 2018-12-22 RX ADMIN — LEVALBUTEROL HYDROCHLORIDE 1.25 MG: 1.25 SOLUTION, CONCENTRATE RESPIRATORY (INHALATION) at 13:42

## 2018-12-22 RX ADMIN — METHOCARBAMOL 500 MG: 500 TABLET ORAL at 05:31

## 2018-12-22 RX ADMIN — HEPARIN SODIUM 5000 UNITS: 5000 INJECTION, SOLUTION INTRAVENOUS; SUBCUTANEOUS at 13:37

## 2018-12-22 RX ADMIN — HYDROMORPHONE HYDROCHLORIDE 0.2 MG: 1 INJECTION, SOLUTION INTRAMUSCULAR; INTRAVENOUS; SUBCUTANEOUS at 20:51

## 2018-12-22 RX ADMIN — ISODIUM CHLORIDE 3 ML: 0.03 SOLUTION RESPIRATORY (INHALATION) at 20:41

## 2018-12-22 RX ADMIN — PRASUGREL HYDROCHLORIDE 10 MG: 10 TABLET, FILM COATED ORAL at 09:53

## 2018-12-22 RX ADMIN — POTASSIUM CHLORIDE 40 MEQ: 1500 TABLET, EXTENDED RELEASE ORAL at 09:52

## 2018-12-22 RX ADMIN — HYDROCODONE BITARTRATE AND ACETAMINOPHEN 1 TABLET: 5; 325 TABLET ORAL at 09:52

## 2018-12-22 RX ADMIN — NICOTINE 1 PATCH: 14 PATCH TRANSDERMAL at 09:53

## 2018-12-22 RX ADMIN — HEPARIN SODIUM 5000 UNITS: 5000 INJECTION, SOLUTION INTRAVENOUS; SUBCUTANEOUS at 05:31

## 2018-12-22 RX ADMIN — LEVALBUTEROL HYDROCHLORIDE 1.25 MG: 1.25 SOLUTION, CONCENTRATE RESPIRATORY (INHALATION) at 20:41

## 2018-12-22 RX ADMIN — NYSTATIN 500000 UNITS: 500000 SUSPENSION ORAL at 09:53

## 2018-12-22 RX ADMIN — HYDROCODONE BITARTRATE AND ACETAMINOPHEN 1 TABLET: 5; 325 TABLET ORAL at 01:20

## 2018-12-22 RX ADMIN — HYDROCODONE BITARTRATE AND ACETAMINOPHEN 1 TABLET: 5; 325 TABLET ORAL at 05:31

## 2018-12-22 RX ADMIN — PREDNISONE 30 MG: 20 TABLET ORAL at 09:52

## 2018-12-22 RX ADMIN — BUDESONIDE AND FORMOTEROL FUMARATE DIHYDRATE 2 PUFF: 160; 4.5 AEROSOL RESPIRATORY (INHALATION) at 09:53

## 2018-12-22 RX ADMIN — NYSTATIN 500000 UNITS: 500000 SUSPENSION ORAL at 17:44

## 2018-12-22 RX ADMIN — BUDESONIDE AND FORMOTEROL FUMARATE DIHYDRATE 2 PUFF: 160; 4.5 AEROSOL RESPIRATORY (INHALATION) at 17:43

## 2018-12-22 RX ADMIN — LISINOPRIL 20 MG: 20 TABLET ORAL at 09:53

## 2018-12-22 RX ADMIN — HYDROCODONE BITARTRATE AND ACETAMINOPHEN 1 TABLET: 5; 325 TABLET ORAL at 14:30

## 2018-12-22 RX ADMIN — GUAIFENESIN 600 MG: 600 TABLET, EXTENDED RELEASE ORAL at 09:52

## 2018-12-22 RX ADMIN — HEPARIN SODIUM 5000 UNITS: 5000 INJECTION, SOLUTION INTRAVENOUS; SUBCUTANEOUS at 21:07

## 2018-12-22 RX ADMIN — NYSTATIN 500000 UNITS: 500000 SUSPENSION ORAL at 12:05

## 2018-12-22 RX ADMIN — AMLODIPINE BESYLATE 5 MG: 5 TABLET ORAL at 09:53

## 2018-12-22 RX ADMIN — CARVEDILOL 25 MG: 12.5 TABLET, FILM COATED ORAL at 15:40

## 2018-12-22 RX ADMIN — VENLAFAXINE HYDROCHLORIDE 150 MG: 150 CAPSULE, EXTENDED RELEASE ORAL at 09:53

## 2018-12-22 RX ADMIN — LIDOCAINE 1 PATCH: 50 PATCH CUTANEOUS at 15:28

## 2018-12-22 RX ADMIN — LEVALBUTEROL HYDROCHLORIDE 1.25 MG: 1.25 SOLUTION, CONCENTRATE RESPIRATORY (INHALATION) at 07:39

## 2018-12-22 RX ADMIN — NYSTATIN 500000 UNITS: 500000 SUSPENSION ORAL at 21:08

## 2018-12-22 RX ADMIN — HYDROMORPHONE HYDROCHLORIDE 0.2 MG: 1 INJECTION, SOLUTION INTRAMUSCULAR; INTRAVENOUS; SUBCUTANEOUS at 03:08

## 2018-12-22 RX ADMIN — HYDROCODONE BITARTRATE AND ACETAMINOPHEN 1 TABLET: 5; 325 TABLET ORAL at 17:44

## 2018-12-22 RX ADMIN — ISODIUM CHLORIDE 3 ML: 0.03 SOLUTION RESPIRATORY (INHALATION) at 13:42

## 2018-12-22 RX ADMIN — HYDROMORPHONE HYDROCHLORIDE 0.2 MG: 1 INJECTION, SOLUTION INTRAMUSCULAR; INTRAVENOUS; SUBCUTANEOUS at 12:06

## 2018-12-22 RX ADMIN — GUAIFENESIN 600 MG: 600 TABLET, EXTENDED RELEASE ORAL at 17:44

## 2018-12-22 RX ADMIN — ISODIUM CHLORIDE 3 ML: 0.03 SOLUTION RESPIRATORY (INHALATION) at 07:39

## 2018-12-22 RX ADMIN — CARVEDILOL 25 MG: 12.5 TABLET, FILM COATED ORAL at 07:37

## 2018-12-22 RX ADMIN — HYDROMORPHONE HYDROCHLORIDE 0.2 MG: 1 INJECTION, SOLUTION INTRAMUSCULAR; INTRAVENOUS; SUBCUTANEOUS at 07:37

## 2018-12-22 NOTE — SOCIAL WORK
LOS 4  [facility]  No 30 readmit  CM met with patient at bedside  Patient states she lives alone in her one story house  There are four steps to enter the house from outside  Patient uses a walker and continuous oxygen  Patient states she is not  Independent with ADLs  Patient does not have rehab hx however patient is open to going to a STR upon clearance for discharge  Patient would like to see if a bed is available in [facility] if not she is willing to go outside of [facility]  Patient has hhc hx with [facility]  Patient fills her prescriptions at [facility] [phone]  Patient states she has hx of depression  Patient does not have substance abuse hx  Patient's son -Zachary Lopez is patient's POA  Patient is retired  Patient states she does not usually go to her PCP appointment  Patient states when she does go to visit PCP her family member would transport her  Upon clearance for discharge patient will be transported to first available bed [facility]  Dorinda Muss transport will need to be scheduled to [facility]  CM will follow patient's needs  nurse and SLIM notified  CM reviewed discharge planning process including the following: identifying help at home, patient preference for discharge planning needs, pharmacy preference, and availability of treatment team to discuss questions or concerns patient and/or family may have regarding understanding medications and recognizing signs and symptoms once discharged  CM also encouraged patient to follow up with all recommended appointments after discharge  Patient advised of importance for patient and family to participate in managing patients medical well being  CM name and role reviewed  Discharge Checklist reviewed and CM will continue to monitor for progress toward discharge goals in nursing and provider rounds

## 2018-12-22 NOTE — PLAN OF CARE
DISCHARGE PLANNING     Discharge to home or other facility with appropriate resources Progressing        DISCHARGE PLANNING - CARE MANAGEMENT     Discharge to post-acute care or home with appropriate resources Progressing        Knowledge Deficit     Patient/family/caregiver demonstrates understanding of disease process, treatment plan, medications, and discharge instructions Progressing        METABOLIC, FLUID AND ELECTROLYTES - ADULT     Fluid balance maintained Progressing        Nutrition/Hydration-ADULT     Nutrient/Hydration intake appropriate for improving, restoring or maintaining nutritional needs Progressing        PAIN - ADULT     Verbalizes/displays adequate comfort level or baseline comfort level Progressing        Potential for Falls     Patient will remain free of falls Progressing        Prexisting or High Potential for Compromised Skin Integrity     Skin integrity is maintained or improved Progressing        RESPIRATORY - ADULT     Achieves optimal ventilation and oxygenation Progressing        SAFETY ADULT     Patient will remain free of falls Progressing

## 2018-12-22 NOTE — SOCIAL WORK
Per SLIM pt is clear for dc tomorrow and she remains agreeable to STR  Per Marilou Munson pt can be accepted tomorrow for admission  CM s/w pt and she is agreeable to [facility]  CM s/w [facility] and setup transport for 11am  CM notified SLIM

## 2018-12-22 NOTE — ASSESSMENT & PLAN NOTE
"(9/17-9/21) and seen by Dr Kj Martell for CAD/ non-STEMI and PCI/DANIELLA mid RCA  "  Per Care everywhere chart  · Continue effient

## 2018-12-22 NOTE — PROGRESS NOTES
[facility] Progress Note  Patient: Raheel Hylton 68 y o  female   MRN: [id number]  PCP: Patricia Maya MD  Unit/Bed#: [facility] Encounter: [id number]  Date Of Visit: 12/22/18    Assessment:    Principal Problem:    Acute on chronic respiratory failure with hypoxia ([facility] )  Active Problems:    Hypokalemia    COPD exacerbation ([facility] )    CAD (coronary artery disease), native coronary artery    Ambulatory dysfunction    Acute low back pain    Tobacco abuse      Plan:    · Hypokalemia resolved  Continue to monitor with serial laboratories as an outpatient  · COPD improving exacerbation will decrease the prednisone after a m  Jose King Patient with less rhonchi after the addition of Mucinex and aggressive pulmonary toilet  · Coronary artery disease native coronary arteries  Continue Effient  · Acute lower back pain:  Patient with improved pain control on current regimen  Negotiated with her to decrease the IV Dilaudid use  Plan for addition of lidocaine patch and continuation of T SL 0 brace  Patient agreeable to transition to rehab in a m  If all goes well  · Essential hypertension continue Zestril and Coreg  · Tobacco abuse:  Patient agrees that a rehab that would not parameters smoke would be best as she would like to quit      VTE Pharmacologic Prophylaxis:   Pharmacologic: Heparin  Mechanical VTE Prophylaxis in Place: Yes    Patient Centered Rounds: Discussed with nursing and care management    Discussions with Specialists or Other Care Team Provider:  Care Management    Education and Discussions with Family / Patient:  Updated patient who is doing much better today    Time Spent for Care: 20 minutes  More than 50% of total time spent on counseling and coordination of care as described above      Current Length of Stay: 5 day(s)    Current Patient Status: Inpatient   Certification Statement: The patient will continue to require additional inpatient hospital stay due to Titration of medication    Discharge Plan / Estimated Discharge Date:  Likely in a m  Code Status: Level 1 - Full Code      Subjective:   Patient in much better spirits today sitting up with her back rounded an unsupported not showing signs of excessive pain  She states the current regimen is working well  She is agreeable to decreasing the IV Dilaudid and trialing a lidocaine patch  She is also agreeable to transition to rehab in a m  Care management has been asked to discuss her plans    Objective:     Vitals:   Temp (24hrs), Av 1 °F (36 7 °C), Min:98 1 °F (36 7 °C), Max:98 1 °F (36 7 °C)    Temp:  [98 1 °F (36 7 °C)] 98 1 °F (36 7 °C)  HR:  [87-93] 93  Resp:  [18] 18  BP: (139-169)/(73-84) 146/73  SpO2:  [95 %-98 %] 95 %  Body mass index is 21 3 kg/m²  Input and Output Summary (last 24 hours):        Intake/Output Summary (Last 24 hours) at 18 1749  Last data filed at 18 1301   Gross per 24 hour   Intake              680 ml   Output              400 ml   Net              280 ml       Physical Exam:     Physical Exam    Well-developed moderately nourished female sitting in the bed up without support or brace in no acute distress she is normocephalic atraumatic pupils equal round and reactive to light extraocular muscles intact mucous membranes are moist neck is supple there is no JVD no lymph nodes no carotid bruits chest is decreased with poor air entry there is no rhonchi rales or wheezes  Cardiovascular regular rate rhythm positive S1 and S2 no S3-S4 murmur or gallop  Abdomen soft nontender nondistended with positive bowel sounds no hepatosplenomegaly no guarding or rebound  Neurologically patient awake alert oriented cranial nerves 2-12 intact    Additional Data:     Labs:      Results from last 7 days  Lab Units 18  0544 18  1055   WBC Thousand/uL 12 22* 12 19*   HEMOGLOBIN g/dL 10 8* 12 2   HEMATOCRIT % 33 9* 38 6   PLATELETS Thousands/uL 272 323   NEUTROS PCT %  --  46   LYMPHS PCT % --  47*   MONOS PCT %  --  5   EOS PCT %  --  0       Results from last 7 days  Lab Units 12/22/18  0544  12/16/18  1307   POTASSIUM mmol/L 3 8  < > 3 1*   CHLORIDE mmol/L 105  < > 96*   CO2 mmol/L 34*  < > 42*   BUN mg/dL 22  < > 11   CREATININE mg/dL 0 68  < > 0 60   CALCIUM mg/dL 9 2  < > 9 5   ALK PHOS U/L  --   --  120*   ALT U/L  --   --  11*   AST U/L  --   --  10   < > = values in this interval not displayed  * I Have Reviewed All Lab Data Listed Above  * Additional Pertinent Lab Tests Reviewed:  All Labs Within Last 24 Hours Reviewed    Imaging:    Imaging Reports Reviewed Today Include:  No  Imaging Personally Reviewed by Myself Includes:  No    Recent Cultures (last 7 days):           Last 24 Hours Medication List:     Current Facility-Administered Medications:  albuterol 2 puff Inhalation Q4H PRN Won Kristi Booth MD   amLODIPine 5 mg Oral Daily Venkatesh Booth MD   budesonide-formoterol 2 puff Inhalation BID Jeff Rudolph MD   carvedilol 25 mg Oral BID With Meals Venkatesh Booth MD   guaiFENesin 600 mg Oral BID Trena Calderon MD   heparin (porcine) 5,000 Units Subcutaneous Q8H [address] Jeff Rudolph MD   HYDROcodone-acetaminophen 1 tablet Oral Q4H PRN Trena Calderon MD   HYDROmorphone 0 2 mg Intravenous Q8H PRN Trena Calderon MD   levalbuterol 1 25 mg Nebulization TID Jeff Rudolph MD   And       sodium chloride 3 mL Nebulization TID Jeff Rduolph MD   lidocaine 1 patch Topical Daily Trena Calderon MD   lisinopril 20 mg Oral Daily Venkatesh Booth MD   methocarbamol 500 mg Oral Q6H PRN Jose Mitchell MD   nicotine 1 patch Transdermal Daily Jeff Rudolph MD   nystatin 500,000 Units Swish & Swallow 4x Daily Jeff Rudolph MD   prasugrel 10 mg Oral Daily Jeff Rudolph MD   predniSONE 30 mg Oral Daily Carey Mosqueda MD   venlafaxine 150 mg Oral Daily Venkatesh Booth, MD        Today, Patient Was Seen By: Bucky Muhammad MD Pager : [phone]     ** Please Note: This note has been constructed using a voice recognition system   **

## 2018-12-22 NOTE — PROGRESS NOTES
Progress Note - Lisha Enriquez 1945, 68 y o  female MRN: [id number]    Unit/Bed#: -01 Encounter: [id number]    Primary Care Provider: Antonio Koenig MD   Date and time admitted to hospital: 12/16/2018 12:40 PM        Hypokalemia   Assessment & Plan    Potassium 2 8  Patient refuses IV repletion  Will give 40 meq po tonight then in the am and recheck labs  COPD exacerbation [facility])   Assessment & Plan    Patient currently on Prednisone 30 mg daily  For the first day will continue x 2 days then decrease by 10 mg      * Acute on chronic respiratory failure with hypoxia [facility])   Assessment & Plan    Patient remains with loose congestion in the upper airway  · mucinex  · Check chest xray  · Mucous clearance protocol     CAD (coronary artery disease), native coronary artery   Assessment & Plan    "(9/17-9/21) and seen by Dr Pura Koch for CAD/ non-STEMI and PCI/DANIELLA mid RCA  "  Per Care everywhere chart  · Continue effient  Ambulatory dysfunction   Assessment & Plan    Continue with PT and patient considering short term rehab  Acute low back pain   Assessment & Plan    With acute fractures in addition to chronic issues/fractures and significant osteoporosis  Back brace as above  Short-term rehab hopefully soon  · Will trial interval for pain management to q 4 hours for the hydrocodone  Will decrease dilaudid to 0 2 mg  · Continue TSLO brace     Essential hypertension   Assessment & Plan    Continue norvasc and zetril, and coreg  Tobacco abuse   Assessment & Plan    Encouraged to quit         VTE Pharmacologic Prophylaxis:   Pharmacologic: Heparin  Mechanical: Mechanical VTE prophylaxis in place  Patient Centered Rounds: Discussed with nursing on walking rounds  Discussions with Specialists or Other Care Team Provider:  None  Education and Discussions with Family / Patient:  Updated patient  Time Spent for Care: 30 minutes    If More than 50% of total time spent on counseling and coordination of care as described above indicate yes or no and described the counseling and coordination:  No    Current Length of Stay: 4 day(s)  Current Patient Status: Inpatient   Certification Statement: The patient will continue to require additional inpatient hospital stay due to Titrating medications    Discharge Plan:  Considering rehab  Code Status: Level 1 - Full Code    Subjective:   Patient states she is discussed it with continued to be sick  She states she still having significant pain  She is agreeable to considering possible rehab  We agreed to adjust pain medications to achieve longer lasting effects of oral medications  Objective:   Vitals:   Temp (24hrs), Av 8 °F (36 6 °C), Min:97 3 °F (36 3 °C), Max:98 3 °F (36 8 °C)    Temp:  [97 3 °F (36 3 °C)-98 3 °F (36 8 °C)] 97 8 °F (36 6 °C)  HR:  [77-97] 77  Resp:  [18] 18  BP: (121-166)/(61-88) 121/61  SpO2:  [88 %-99 %] 96 %  Body mass index is 21 3 kg/m²  Input and Output Summary (last 24 hours): Intake/Output Summary (Last 24 hours) at 18 2304  Last data filed at 18 2201   Gross per 24 hour   Intake              920 ml   Output              400 ml   Net              520 ml       Physical Exam:  General frail ill-appearing female mild distress secondary to loose cough otherwise normocephalic atraumatic pupils equal round and reactive to light extraocular muscles intact mucous membranes are moist neck is supple there is no JVD no lymph nodes no carotid bruits chest is decreased with poor air entry at the bases upper mid field and apices have coarse wet rhonchi    Cardiovascular regular rhythm positive S1 and S2 no B7-G5 of 1 a 6 systolic ejection murmur  Abdomen soft nontender nondistended with positive bowel sounds no hepatosplenomegaly no guarding or rebound  Neurologically patient awake alert oriented cranial nerves 2-12 intact    Physical Exam    Additional Data:   Labs:    Results from last 7 days  Lab Units 12/21/18  1055   WBC Thousand/uL 12 19*   HEMOGLOBIN g/dL 12 2   HEMATOCRIT % 38 6   PLATELETS Thousands/uL 323   NEUTROS PCT % 46   LYMPHS PCT % 47*   MONOS PCT % 5   EOS PCT % 0       Results from last 7 days  Lab Units 12/21/18  1055  12/16/18  1307   POTASSIUM mmol/L 2 8*  < > 3 1*   CHLORIDE mmol/L 103  < > 96*   CO2 mmol/L 35*  < > 42*   BUN mg/dL 18  < > 11   CREATININE mg/dL 0 79  < > 0 60   CALCIUM mg/dL 9 4  < > 9 5   ALK PHOS U/L  --   --  120*   ALT U/L  --   --  11*   AST U/L  --   --  10   < > = values in this interval not displayed  * I Have Reviewed All Lab Data Listed Above  * Additional Pertinent Lab Tests Reviewed: All Labs Within Last 24 Hours Reviewed    Imaging:    Imaging Reports Reviewed Today Include:  Chest x-ray ordered      Cultures:   Blood Culture: No results found for: BLOODCX  Urine Culture: No results found for: URINECX  Sputum Culture: No components found for: SPUTUMCX  Wound Culture: No results found for: WOUNDCULT    Last 24 Hours Medication List:     Current Facility-Administered Medications:  albuterol 2 puff Inhalation Q4H PRN Venkatesh Booth MD   amLODIPine 5 mg Oral Daily Venkatesh Booth MD   budesonide-formoterol 2 puff Inhalation BID Edgar Loving MD   carvedilol 25 mg Oral BID With Meals Venkatesh Booth MD   guaiFENesin 600 mg Oral BID Teddy Michael MD   heparin (porcine) 5,000 Units Subcutaneous Q8H [address] Edgar Loving MD   HYDROcodone-acetaminophen 1 tablet Oral Q4H PRN Teddy Michael MD   HYDROmorphone 0 2 mg Intravenous Q4H PRN Teddy Michael MD   levalbuterol 1 25 mg Nebulization TID Edgar Loving MD   And       sodium chloride 3 mL Nebulization TID Edgar Loving MD   lisinopril 20 mg Oral Daily Venkatesh Booth MD   methocarbamol 500 mg Oral Q6H PRN Valdemar Workman MD   nicotine 1 patch Transdermal Daily Edgar Loving MD   nystatin 500,000 Units Swish & Swallow 4x Daily Marizol Vuong MD   potassium chloride 40 mEq Oral BID Julissa Madsen MD   prasugrel 10 mg Oral Daily Marizol Vuong MD   predniSONE 30 mg Oral Daily Mj Anderson MD   venlafaxine 150 mg Oral Daily Marizol Vuong MD        Today, Patient Was Seen By: Julissa Madsen MD    ** Please Note: Dragon 360 Dictation voice to text software may have been used in the creation of this document   **

## 2018-12-22 NOTE — PLAN OF CARE
Problem: DISCHARGE PLANNING - CARE MANAGEMENT  Goal: Discharge to post-acute care or home with appropriate resources  INTERVENTIONS:  - Conduct assessment to determine patient/family and health care team treatment goals, and need for post-acute services based on payer coverage, community resources, and patient preferences, and barriers to discharge  - Address psychosocial, clinical, and financial barriers to discharge as identified in assessment in conjunction with the patient/family and health care team  - Arrange appropriate level of post-acute services according to patient's   needs and preference and payer coverage in collaboration with the physician and health care team  - Communicate with and update the patient/family, physician, and health care team regarding progress on the discharge plan  - Arrange appropriate transportation to post-acute venues  Outcome: Progressing  LOS 4  LACE 74  No 30 readmit  CM met with patient at bedside  Patient states she lives alone in her one story house  There are four steps to enter the house from outside  Patient uses a walker and continuous oxygen  Patient states she is not  Independent with ADLs  Patient does not have rehab hx however patient is open to going to a STR upon clearance for discharge  Patient would like to see if a bed is available in [place] if not she is willing to go outside of [facility]  Patient has hhc hx with residential care center  Patient fills her prescriptions at [facility] - [phone]  Patient states she has hx of depression  Patient does not have substance abuse hx  Patient's son -Bentley Ariza is patient's POA  Patient is retired  Patient states she does not usually go to her PCP appointment  Patient states when she does go to visit PCP her family member would transport her  Upon clearance for discharge patient will be transported to first available bed [facility]  [facility] will need to be scheduled to [facility]    [facility] will follow patient's needs  nurse and SLIM notified

## 2018-12-22 NOTE — ASSESSMENT & PLAN NOTE
Potassium 2 8  Patient refuses IV repletion  Will give 40 meq po tonight then in the am and recheck labs

## 2018-12-23 VITALS
RESPIRATION RATE: 18 BRPM | SYSTOLIC BLOOD PRESSURE: 144 MMHG | HEART RATE: 97 BPM | HEIGHT: 67 IN | TEMPERATURE: 98.2 F | BODY MASS INDEX: 21.35 KG/M2 | DIASTOLIC BLOOD PRESSURE: 65 MMHG | OXYGEN SATURATION: 97 % | WEIGHT: 136 LBS

## 2018-12-23 PROCEDURE — 99239 HOSP IP/OBS DSCHRG MGMT >30: CPT | Performed by: INTERNAL MEDICINE

## 2018-12-23 PROCEDURE — 94760 N-INVAS EAR/PLS OXIMETRY 1: CPT

## 2018-12-23 PROCEDURE — 94668 MNPJ CHEST WALL SBSQ: CPT

## 2018-12-23 PROCEDURE — 94640 AIRWAY INHALATION TREATMENT: CPT

## 2018-12-23 RX ORDER — PREDNISONE 10 MG/1
20 TABLET ORAL DAILY
Refills: 0
Start: 2018-12-23 | End: 2020-01-08 | Stop reason: HOSPADM

## 2018-12-23 RX ORDER — GUAIFENESIN 600 MG
600 TABLET, EXTENDED RELEASE 12 HR ORAL 2 TIMES DAILY
Qty: 60 TABLET | Refills: 0 | Status: SHIPPED | OUTPATIENT
Start: 2018-12-23

## 2018-12-23 RX ORDER — OXYCODONE AND ACETAMINOPHEN 7.5; 325 MG/1; MG/1
1 TABLET ORAL EVERY 4 HOURS PRN
Qty: 30 TABLET | Refills: 0 | Status: SHIPPED | OUTPATIENT
Start: 2018-12-23 | End: 2019-01-02

## 2018-12-23 RX ORDER — NICOTINE 21 MG/24HR
1 PATCH, TRANSDERMAL 24 HOURS TRANSDERMAL DAILY
Qty: 28 PATCH | Refills: 0 | Status: SHIPPED | OUTPATIENT
Start: 2018-12-23 | End: 2019-04-12

## 2018-12-23 RX ORDER — SODIUM CHLORIDE FOR INHALATION 0.9 %
3 VIAL, NEBULIZER (ML) INHALATION
Refills: 0
Start: 2018-12-23

## 2018-12-23 RX ORDER — METHOCARBAMOL 500 MG/1
500 TABLET, FILM COATED ORAL EVERY 6 HOURS PRN
Refills: 0
Start: 2018-12-23 | End: 2021-08-30 | Stop reason: HOSPADM

## 2018-12-23 RX ORDER — LIDOCAINE 50 MG/G
1 PATCH TOPICAL DAILY
Qty: 30 PATCH | Refills: 0 | Status: ON HOLD | OUTPATIENT
Start: 2018-12-23 | End: 2020-01-07 | Stop reason: SDUPTHER

## 2018-12-23 RX ORDER — HYDROCODONE BITARTRATE AND ACETAMINOPHEN 5; 325 MG/1; MG/1
1 TABLET ORAL EVERY 4 HOURS PRN
Qty: 60 TABLET | Refills: 0 | Status: SHIPPED | OUTPATIENT
Start: 2018-12-23 | End: 2019-01-02

## 2018-12-23 RX ORDER — LEVALBUTEROL 1.25 MG/.5ML
1.25 SOLUTION, CONCENTRATE RESPIRATORY (INHALATION)
Refills: 0
Start: 2018-12-23

## 2018-12-23 RX ADMIN — CARVEDILOL 25 MG: 12.5 TABLET, FILM COATED ORAL at 09:11

## 2018-12-23 RX ADMIN — GUAIFENESIN 600 MG: 600 TABLET, EXTENDED RELEASE ORAL at 09:11

## 2018-12-23 RX ADMIN — LIDOCAINE 1 PATCH: 50 PATCH CUTANEOUS at 09:12

## 2018-12-23 RX ADMIN — LISINOPRIL 20 MG: 20 TABLET ORAL at 09:11

## 2018-12-23 RX ADMIN — NICOTINE 1 PATCH: 14 PATCH TRANSDERMAL at 09:10

## 2018-12-23 RX ADMIN — AMLODIPINE BESYLATE 5 MG: 5 TABLET ORAL at 09:12

## 2018-12-23 RX ADMIN — PRASUGREL HYDROCHLORIDE 10 MG: 10 TABLET, FILM COATED ORAL at 09:11

## 2018-12-23 RX ADMIN — BUDESONIDE AND FORMOTEROL FUMARATE DIHYDRATE 2 PUFF: 160; 4.5 AEROSOL RESPIRATORY (INHALATION) at 09:13

## 2018-12-23 RX ADMIN — VENLAFAXINE HYDROCHLORIDE 150 MG: 150 CAPSULE, EXTENDED RELEASE ORAL at 09:13

## 2018-12-23 RX ADMIN — HYDROMORPHONE HYDROCHLORIDE 0.2 MG: 1 INJECTION, SOLUTION INTRAMUSCULAR; INTRAVENOUS; SUBCUTANEOUS at 05:08

## 2018-12-23 RX ADMIN — ISODIUM CHLORIDE 3 ML: 0.03 SOLUTION RESPIRATORY (INHALATION) at 07:25

## 2018-12-23 RX ADMIN — HYDROCODONE BITARTRATE AND ACETAMINOPHEN 1 TABLET: 5; 325 TABLET ORAL at 09:11

## 2018-12-23 RX ADMIN — NYSTATIN 500000 UNITS: 500000 SUSPENSION ORAL at 09:11

## 2018-12-23 RX ADMIN — HYDROCODONE BITARTRATE AND ACETAMINOPHEN 1 TABLET: 5; 325 TABLET ORAL at 13:05

## 2018-12-23 RX ADMIN — LEVALBUTEROL HYDROCHLORIDE 1.25 MG: 1.25 SOLUTION, CONCENTRATE RESPIRATORY (INHALATION) at 07:25

## 2018-12-23 RX ADMIN — HEPARIN SODIUM 5000 UNITS: 5000 INJECTION, SOLUTION INTRAVENOUS; SUBCUTANEOUS at 05:08

## 2018-12-23 RX ADMIN — PREDNISONE 30 MG: 20 TABLET ORAL at 09:12

## 2018-12-23 RX ADMIN — HYDROCODONE BITARTRATE AND ACETAMINOPHEN 1 TABLET: 5; 325 TABLET ORAL at 04:13

## 2018-12-23 RX ADMIN — HYDROCODONE BITARTRATE AND ACETAMINOPHEN 1 TABLET: 5; 325 TABLET ORAL at 00:01

## 2018-12-23 NOTE — DISCHARGE SUMMARY
Discharge Summary - [facility]    Patient Information: Kathy Patricio 68 y o  female MRN: [id number]  Unit/Bed#: -01 Encounter: [id number]    Discharging Physician / Practitioner: Ramakrishna Dueñas MD  PCP: Nicole Bunn MD  Admission Date: 12/16/2018  Discharge Date: 12/23/18    Reason for Admission:  Shortness of breath    Discharge Diagnoses:     Principal Problem:  ·   Acute on chronic respiratory failure with hypoxia [facility]):  Exacerbation of COPD and congestive heart failure:  Patient will need slow taper from steroids and continued mucolytic as well as aggressive pulmonary toilet  Active Problems:  ·   Hypokalemia:  Resolved at the time of discharge will need labs in the next 10 days to evaluate  ·   COPD exacerbation [facility]):  Continue aggressive pulmonary toilet and tapering steroid  ·   CAD (coronary artery disease), native coronary artery:  Continue Zestril and Coreg  Patient in agreement to Tobacco cessation  ·   Ambulatory dysfunction: To stay skilled nursing facility for rehabilitation  Use T SL 0 brace to help with pain  ·   Acute low back pain:  Acute T12 and L1 compression fractures Secondary to multiple vertebral fractures  Continue T SL 0 brace, calcium supplementation  Would recommend oxycodone limited doses as needed for pain control  Continue lidocaine patch  Patient can be referred to Endocrinology for definitive osteoporosis treatment once more stable  Would not recommend this during a period of significant steroid use as this may be irritative to stomach causing significant GI complications  Continue calcium replacement for now  ·   Tobacco abuse:  Nicotine replacement  Resolved Problems:    * No resolved hospital problems  *      Consultations During Hospital Stay:  [id number] fractures continue TF fellow brace  When medically stable consider endocrinology evaluation for definitive osteoporosis including either a bisphosphonate or Forteo  Procedures Performed:     · Admitting chest x-ray:  No acute cardiopulmonary disease  · PEs CT a:  No pulmonary embolus  While some described nodule on the left adrenal gland this may represent adenoma  Non emergent CT adrenal protocol fall can be performed for further evaluation  Diverticulosis coli  Multiple age indeterminate compression deformities  Reconstruction of the thoracolumbar area shows hold vertebroplasties at T7 and T8  Age-indeterminate severe compression of L1 with slight retropulsion mild compression fractures of T12-L3 L4  Severe disc space narrowing for L5-S1  Mild to moderate disc space narrowing for T11-T12 L1-L2 L3 and L4  L1 compression was slight retropulsion mild canal narrowing deemed medical management by Orthopedics  · MRI acute and subacute fractures are seen in T12-L1 and L4  T12 appear severe  Discussed extrusion in the left paramedian portion of the disc at L2-L3 with mild distortion of the CT equal sac and compression on the left L3 nerve  · Upright thoracolumbar study of the spine:  Stable T12-L1 L4 compression fractures slight retropulsion of the superior endplate stable since CT  · Repeat chest x-ray shows no abnormality or pneumonia    Significant Findings / Test Results:     · As above  · Discharging potassium 3 8  · Discharging creatinine 0 68  · Discharging WBC count 12 22 discharging hemoglobin 10 8    Incidental Findings:   · Adrenal gland nodule felt to be probable adenoma  Once stabilized could have repeat non emergent CT scan of the abdomen with adrenal protocol  Test Results Pending at Discharge (will require follow up): · None     Outpatient Tests Requested:  · Patient may need further imaging as outpatient depending on her symptom management    Patient will need future CT of the adrenal gland to can be done when medically stable    Complications:  None, although pain management is going to be an issue for this patient with underlying COPD     Hospital Course:     Ananda Tapia is a 68 y o  female patient who originally presented to the hospital on 12/16/2018 due to shortness of breath and back pain  In the emergency room patient found to have hypertensive urgency with tachycardia and hypoxia likely secondary to pain and exacerbation of COPD with mild rhonchi and wheezing  Patient was admitted to the hospital found to have no evidence for infection but was started on steroids for COPD exacerbation  The be ticks was consulted for the finding of multiple compression fractures  She was found to have a acute L1, T12 fracture with mild retropulsion and potential evidence for slight compression on L3 nerve root was undetermined whether this is chronic or acute  Orthopedics saw the patient and requested medical management at this time  Opiates were titrated as best we could in light of her underlying pulmonary disease  A TSL0 brace was placed  Lidocaine patch was also used  Patient had reasonable day prior to discharge on the day of discharge she had slight exacerbation of her pain but I think this was more related to anxiety  Patient definitely has difficulty with pain management objective  The patient was tapered back to oral steroids which will need to slowly be tapered  She can follow up as an outpatient with Orthopedics and will need to see her primary care physician for the finding of a small adrenal mass which will need further characterization non emergently on a CT scan dedicated to the adrenal gland  Condition at Discharge: fair     Discharge Day Visit / Exam:     Subjective:  Patient had a rough morning but was able to be get dressed and be a little bit more comfortable by afternoon  Slightly emotional about going to rehab    Vitals: Blood Pressure: 144/65 (12/23/18 0700)  Pulse: 97 (12/23/18 0700)  Temperature: 98 2 °F (36 8 °C) (12/23/18 0700)  Temp Source: Oral (12/23/18 0700)  Respirations: 18 (12/23/18 0700)  Height: 5' 7" (170 2 cm) (12/18/18 1145)  Weight - Scale: 61 7 kg (136 lb) (12/18/18 1142)  SpO2: 97 % (12/23/18 0725)  Exam:   Physical Exam    Generally frail elderly female mild distress over the course of the morning due to anxiety about moving to rehab  Also still having some pain  We are utilizing increased dose of opiate and trying Atrovent therapy  Otherwise normocephalic atraumatic pupils equal round and reactive to light extraocular muscles intact mucous membranes are moist neck is supple there is no JVD no lymph nodes no carotid bruits chest is decreased with poor air entry there is no rhonchi rales or wheezes  Cardiovascular regular rate rhythm positive S1-S2  Abdomen soft nontender nondistended with positive bowel sounds no hepatosplenomegaly no guarding or rebound  Neurologically awake alert oriented cranial nerves 2-12 intact no radiating pain down the legs, patient able to bear weight    Discharge instructions/Information to patient and family:   See after visit summary for information provided to patient and family  Provisions for Follow-Up Care:  See after visit summary for information related to follow-up care and any pertinent home health orders  Disposition:     [facility] [address] SN[address]:   · Not Applicable to this Patient - Not Applicable to this Patient    Planned Readmission:  Patient is at high risk for readmission due to pain issues     Discharge Statement:  I spent 35 minutes discharging the patient  Updated patient's son prior to discharge This time was spent on the day of discharge  I had direct contact with the patient on the day of discharge  Greater than 50% of the total time was spent examining patient, answering all patient questions, arranging and discussing plan of care with patient as well as directly providing post-discharge instructions  Additional time then spent on discharge activities      Discharge Medications:  See after visit summary for reconciled discharge medications provided to patient and family        ** Please Note: This note has been constructed using a voice recognition system **

## 2018-12-23 NOTE — SOCIAL WORK
Per RN transport did not arrive and called earlier to say they'd be here 1300  CM s/w Laura Urbina who reported transport was with [facility]gr[facility] Boast s/w Jeronimo Castaneda and they will arrive shortly after 1400  Marygrace Boast requested cb if pt is not picked up  CM notified RN

## 2018-12-23 NOTE — DISCHARGE INSTRUCTIONS
Acute Low Back Pain   WHAT YOU NEED TO KNOW:   Acute low back pain is sudden discomfort in your lower back area that lasts for up to 6 weeks  The discomfort makes it difficult to tolerate activity  DISCHARGE INSTRUCTIONS:   Seek care immediately or call 911 if:   · You have severe pain  · You have sudden stiffness and heaviness on both buttocks down to both legs  · You have numbness or weakness in one leg, or pain in both legs  · You have numbness in your genital area or across your lower back  · You cannot control your urine or bowel movements  Contact your healthcare provider if:   · You have a fever  · You have pain at night or when you rest     · Your pain does not get better with treatment  · You have pain that worsens when you cough or sneeze  · You suddenly feel something pop or snap in your back  · You have questions or concerns about your condition or care  Medicines: The following medicines may be ordered by your healthcare provider:  · Acetaminophen  decreases pain  It is available without a doctor's order  Ask how much to take and how often to take it  Follow directions  Acetaminophen can cause liver damage if not taken correctly  · NSAIDs  help decrease swelling and pain  This medicine is available with or without a doctor's order  NSAIDs can cause stomach bleeding or kidney problems in certain people  If you take blood thinner medicine, always ask your healthcare provider if NSAIDs are safe for you  Always read the medicine label and follow directions  · Prescription pain medicine  may be given  Ask your healthcare provider how to take this medicine safely  · Muscle relaxers  decrease pain by relaxing the muscles in your lower spine  · Take your medicine as directed  Contact your healthcare provider if you think your medicine is not helping or if you have side effects  Tell him of her if you are allergic to any medicine   Keep a list of the medicines, vitamins, and herbs you take  Include the amounts, and when and why you take them  Bring the list or the pill bottles to follow-up visits  Carry your medicine list with you in case of an emergency  Self-care:   · Stay active  as much as you can without causing more pain  Bed rest could make your back pain worse  Start with some light exercises such as walking  Avoid heavy lifting until your pain is gone  Ask for more information about the activities or exercises that are right for you  · Ice  helps decrease swelling, pain, and muscle spams  Put crushed ice in a plastic bag  Cover it with a towel  Place it on your lower back for 20 to 30 minutes every 2 hours  Do this for about 2 to 3 days after your pain starts, or as directed  · Heat  helps decrease pain and muscle spasms  Start to use heat after treatment with ice has stopped  Use a small towel dampened with warm water or a heating pad, or sit in a warm bath  Apply heat on the area for 20 to 30 minutes every 2 hours for as many days as directed  Alternate heat and ice  Prevent acute low back pain:   · Use proper body mechanics  ¨ Bend at the hips and knees when you  objects  Do not bend from the waist  Use your leg muscles as you lift the load  Do not use your back  Keep the object close to your chest as you lift it  Try not to twist or lift anything above your waist     ¨ Change your position often when you stand for long periods of time  Rest one foot on a small box or footrest, and then switch to the other foot often  ¨ Try not to sit for long periods of time  When you do, sit in a straight-backed chair with your feet flat on the floor  Never reach, pull, or push while you are sitting  · Do exercises that strengthen your back muscles  Warm up before you exercise  Ask your healthcare provider the best exercises for you  · Maintain a healthy weight  Ask your healthcare provider how much you should weigh   Ask him to help you create a weight loss plan if you are overweight  Follow up with your healthcare provider as directed:  Return for a follow-up visit if you still have pain after 1 to 3 weeks of treatment  You may need to visit an orthopedist if your back pain lasts more than 6 to 12 weeks  Write down your questions so you remember to ask them during your visits  © 2017 [address] [employer] Information is for End User's use only and may not be sold, redistributed or otherwise used for commercial purposes  All illustrations and images included in CareNotes® are the copyrighted property of A D A M , Inc  or Luis M Edmonds  The above information is an  only  It is not intended as medical advice for individual conditions or treatments  Talk to your doctor, nurse or pharmacist before following any medical regimen to see if it is safe and effective for you  Chronic Respiratory Failure   WHAT YOU NEED TO KNOW:   Chronic respiratory failure (CRF) is a long-term condition that happens when your lungs cannot get enough oxygen into your blood  Your heart, brain, and other organs depend on the oxygen to work properly  CRF can also happen when your lungs cannot get the carbon dioxide out of your blood  A buildup of carbon dioxide in your blood can cause damage to your organs  The decrease in oxygen and the buildup of carbon dioxide can happen at the same time  CRF happens may develop over a period of days or years  DISCHARGE INSTRUCTIONS:   Call 911 for any of the following:   · You feel lightheaded or you develop a sudden cold sweat, especially with chest pain or trouble breathing  · You are having more trouble catching your breath (you cannot speak in full sentences)  · You cough up blood  Contact your healthcare provider if:   · You have a fever  · You have new symptoms or your symptoms get worse  · You have swelling in your legs or ankles       · You gain 3 or more pounds (1 4 kg) in a week (or more than your healthcare provider says you should)  · You have questions or concerns about your condition or care  Management:   · Use your oxygen as directed  You may need extra oxygen if your blood oxygen level is lower than it should be  · Do not smoke and avoid others who smoke  If you smoke, it is never too late to quit  You are likely to live longer and breathe easier if you quit smoking  Ask for information about medicines and support programs that can help you quit  Being around others who smoke can also make your symptoms worse  · Prevent infections  Get a flu vaccine every year as soon as it becomes available  Ask your healthcare provider if you should also get vaccines to prevent pneumonia, whooping cough, tetanus, and diphtheria  Avoid people who are sick, and wash your hands often  · Use pursed-lip breathing any time you feel short of breath  Take a deep breath in through your nose  Slowly breathe out through your mouth with your lips pursed for twice as long as you inhaled  You can also practice this breathing pattern while you bend, lift, climb stairs, or exercise  It slows down your breathing and helps move more air in and out of your lungs  · Prepare for emergencies  Keep phone numbers for your healthcare provider, hospital, and someone close to you with you at all times  Also, keep a list of the medicines you take  · Go to pulmonary rehabilitation (rehab)  Your healthcare provider may suggest pulmonary rehab  Pulmonary rehab will educate you about your condition and will help you improve your quality of life  Follow up with your healthcare provider as directed: You may need more tests or treatments  Write down your questions so you remember to ask them during your visits  © 2017 [employer] Information is for End User's use only and may not be sold, redistributed or otherwise used for commercial purposes   All illustrations and images included in [facility] are the copyrighted property of A D A M , Inc  or Luis M Edmonds  The above information is an  only  It is not intended as medical advice for individual conditions or treatments  Talk to your doctor, nurse or pharmacist before following any medical regimen to see if it is safe and effective for you  How to Stop Smoking   WHAT YOU NEED TO KNOW:   You will improve your health and the health of others around you if you stop smoking  Your risk for heart and lung disease, cancer, stroke, heart attack, and vision problems will also decrease  You can benefit from quitting no matter how long you have smoked  DISCHARGE INSTRUCTIONS:   Prepare to stop smoking:  Nicotine is a highly addictive drug found in cigarettes  Withdrawal symptoms can happen when you stop smoking and make it hard to quit  These include anxiety, depression, irritability, trouble sleeping, and increased appetite  You increase your chances of success if you prepare to quit  · Set a quit date  Madhuri [place] a date that is within the next 2 weeks  Do not pick a day that you think may be stressful or busy  Write down the day or [place] it on your calender  · Tell friends and family that you plan to quit  Explain that you may have withdrawal symptoms when you try to quit  Ask them to support you  They may be able to encourage you and help reduce your stress to make it easier for you to quit  · Make a list of your reasons for quitting  Put the list somewhere you will see it every day, such as your refrigerator  You can look at the list when you have a craving  · Remove all tobacco and nicotine products from your home, car, and workplace  Also, remove anything else that will tempt you to smoke, such as lighters, matches, or ashtrays  Clean your car, home, and places at work that smell like smoke  The smell of smoke can trigger a craving  · Identify triggers that make you want to smoke    This may include activities, feelings, or people  Also write down 1 way you can deal with each of your triggers  For example, if you want to smoke as soon as you wake up, plan another activity during this time, such as exercise  · Make a plan for how you will quit  Learn about the tools that can help you quit, such as medicine, counseling, or nicotine replacement therapy  Choose at least 2 options to help you quit  Tools to help you stop smoking:   · Counseling  from a trained healthcare provider can provide you with support and skills to quit smoking  The provider will also teach you to manage your withdrawal symptoms and cravings  You may receive counseling from one counselor, in group therapy, or through phone therapy called a quit line  · Nicotine replacement therapy (NRT)  such as nicotine patches, gum, or lozenges may help reduce your nicotine cravings  You may get these without a doctor's order  Do not use e-cigarettes or smokeless tobacco in place of cigarettes or to help you quit  They still contain nicotine  · Prescription medicines  such as nasal sprays or nicotine inhalers may help reduce your withdrawal symptoms  Other medicines may also be used to reduce your urge to smoke  Ask your healthcare provider about these medicines  You may need to start certain medicines 2 weeks before your quit date for them to work well  · Hypnosis  is a practice that helps guide you through thoughts and feelings  Hypnosis may help decrease your cravings and make you more willing to quit  · Acupuncture therapy  uses very thin needles to balance energy channels in the body  This is thought to help decrease cravings and symptoms of nicotine withdrawal      · Support groups  let you talk to others who are trying to quit or have already quit  It may be helpful to speak with others about how they quit  Manage your cravings:   · Avoid situations, people, and places that tempt you to smoke    Go to nonsmoking places, such as libraries or restaurants  Understand what tempts you and try to avoid these things  · Keep your hands busy  Hold things such as a stress ball or pen  · Put candy or toothpicks in your mouth  Keep lollipops, sugarless gum, or toothpicks with you at all times  · Do not have alcohol or caffeine  These drinks may tempt you to smoke  Drink healthy liquids such as water or juice instead  · Reward yourself when you resist your cravings  Rewards will motivate you and help you stay positive  · Do an activity that distracts you from your craving  Examples include going for a walk, exercising, or cleaning  Prevent weight gain after you quit:  You may gain a few pounds after you quit smoking  It is healthier for you to gain a few pounds than to continue to smoke  The following can help you prevent weight gain:  · Eat healthy foods  These include fruits, vegetables, whole-grain breads, low-fat dairy products, beans, lean meats, and fish  Eat healthy snacks, such as low-fat yogurt, if you get hungry between meals  · Drink water before, during, and between meals  This will make your stomach feel full and help prevent you from overeating  Ask your healthcare provider how much liquid to drink each day and which liquids are best for you  · Exercise  Take a walk or do some kind of exercise every day  Ask your healthcare provider what exercise is right for you  This may help reduce your cravings and reduce stress  For support and more information:   · Smokefree  gov  Phone: 3- 020 - 587-3106  Web Address: www smokefree  gov  © 2017 [address] [employer] [employer] Information is for End User's use only and may not be sold, redistributed or otherwise used for commercial purposes  All illustrations and images included in CareNotes® are the copyrighted property of A D A [employer] , [employer]  or Luis M Edmonds  The above information is an  only   It is not intended as medical advice for individual conditions or treatments  Talk to your doctor, nurse or pharmacist before following any medical regimen to see if it is safe and effective for you

## 2018-12-25 ENCOUNTER — DOCUMENTATION (OUTPATIENT)
Dept: MEDSURG UNIT | Facility: HOSPITAL | Age: 73
End: 2018-12-25

## 2019-04-12 ENCOUNTER — APPOINTMENT (EMERGENCY)
Dept: CT IMAGING | Facility: HOSPITAL | Age: 74
DRG: 871 | End: 2019-04-12
Payer: MEDICARE

## 2019-04-12 ENCOUNTER — HOSPITAL ENCOUNTER (INPATIENT)
Facility: HOSPITAL | Age: 74
LOS: 6 days | Discharge: HOME HEALTH | DRG: 871 | End: 2019-04-18
Attending: EMERGENCY MEDICINE | Admitting: HOSPITALIST
Payer: MEDICARE

## 2019-04-12 DIAGNOSIS — R10.9 LEFT FLANK PAIN: ICD-10-CM

## 2019-04-12 DIAGNOSIS — N17.9 AKI (ACUTE KIDNEY INJURY) (HCC): ICD-10-CM

## 2019-04-12 DIAGNOSIS — J18.9 MULTIFOCAL PNEUMONIA: Primary | ICD-10-CM

## 2019-04-12 DIAGNOSIS — A41.9 SEPSIS (HCC): ICD-10-CM

## 2019-04-12 DIAGNOSIS — J96.21 ACUTE ON CHRONIC RESPIRATORY FAILURE WITH HYPOXIA (HCC): ICD-10-CM

## 2019-04-12 DIAGNOSIS — E87.6 HYPOKALEMIA: ICD-10-CM

## 2019-04-12 LAB
ALBUMIN SERPL BCP-MCNC: 2.5 G/DL (ref 3.5–5)
ALP SERPL-CCNC: 215 U/L (ref 46–116)
ALT SERPL W P-5'-P-CCNC: 17 U/L (ref 12–78)
ANION GAP SERPL CALCULATED.3IONS-SCNC: 6 MMOL/L (ref 4–13)
ANION GAP SERPL CALCULATED.3IONS-SCNC: 8 MMOL/L (ref 4–13)
APTT PPP: 30 SECONDS (ref 26–38)
AST SERPL W P-5'-P-CCNC: 28 U/L (ref 5–45)
ATRIAL RATE: 110 BPM
BACTERIA UR QL AUTO: NORMAL /HPF
BASOPHILS # BLD AUTO: 0.05 THOUSANDS/ΜL (ref 0–0.1)
BASOPHILS # BLD AUTO: 0.05 THOUSANDS/ΜL (ref 0–0.1)
BASOPHILS NFR BLD AUTO: 0 % (ref 0–1)
BASOPHILS NFR BLD AUTO: 0 % (ref 0–1)
BILIRUB DIRECT SERPL-MCNC: 0.13 MG/DL (ref 0–0.2)
BILIRUB SERPL-MCNC: 0.3 MG/DL (ref 0.2–1)
BILIRUB UR QL STRIP: NEGATIVE
BUN SERPL-MCNC: 25 MG/DL (ref 5–25)
BUN SERPL-MCNC: 30 MG/DL (ref 5–25)
CALCIUM SERPL-MCNC: 8.5 MG/DL (ref 8.3–10.1)
CALCIUM SERPL-MCNC: 9.2 MG/DL (ref 8.3–10.1)
CHLORIDE SERPL-SCNC: 100 MMOL/L (ref 100–108)
CHLORIDE SERPL-SCNC: 99 MMOL/L (ref 100–108)
CLARITY UR: CLEAR
CO2 SERPL-SCNC: 30 MMOL/L (ref 21–32)
CO2 SERPL-SCNC: 30 MMOL/L (ref 21–32)
COLOR UR: YELLOW
CREAT SERPL-MCNC: 0.83 MG/DL (ref 0.6–1.3)
CREAT SERPL-MCNC: 1.02 MG/DL (ref 0.6–1.3)
EOSINOPHIL # BLD AUTO: 0.04 THOUSAND/ΜL (ref 0–0.61)
EOSINOPHIL # BLD AUTO: 0.06 THOUSAND/ΜL (ref 0–0.61)
EOSINOPHIL NFR BLD AUTO: 0 % (ref 0–6)
EOSINOPHIL NFR BLD AUTO: 0 % (ref 0–6)
ERYTHROCYTE [DISTWIDTH] IN BLOOD BY AUTOMATED COUNT: 13.7 % (ref 11.6–15.1)
ERYTHROCYTE [DISTWIDTH] IN BLOOD BY AUTOMATED COUNT: 13.8 % (ref 11.6–15.1)
GFR SERPL CREATININE-BSD FRML MDRD: 55 ML/MIN/1.73SQ M
GFR SERPL CREATININE-BSD FRML MDRD: 71 ML/MIN/1.73SQ M
GLUCOSE SERPL-MCNC: 117 MG/DL (ref 65–140)
GLUCOSE SERPL-MCNC: 129 MG/DL (ref 65–140)
GLUCOSE UR STRIP-MCNC: NEGATIVE MG/DL
HCT VFR BLD AUTO: 31.6 % (ref 34.8–46.1)
HCT VFR BLD AUTO: 33.8 % (ref 34.8–46.1)
HGB BLD-MCNC: 10.2 G/DL (ref 11.5–15.4)
HGB BLD-MCNC: 10.9 G/DL (ref 11.5–15.4)
HGB UR QL STRIP.AUTO: NEGATIVE
IMM GRANULOCYTES # BLD AUTO: 0.13 THOUSAND/UL (ref 0–0.2)
IMM GRANULOCYTES # BLD AUTO: 0.13 THOUSAND/UL (ref 0–0.2)
IMM GRANULOCYTES NFR BLD AUTO: 1 % (ref 0–2)
IMM GRANULOCYTES NFR BLD AUTO: 1 % (ref 0–2)
INR PPP: 1.11 (ref 0.86–1.17)
KETONES UR STRIP-MCNC: NEGATIVE MG/DL
LACTATE SERPL-SCNC: 0.9 MMOL/L (ref 0.5–2)
LEUKOCYTE ESTERASE UR QL STRIP: NEGATIVE
LIPASE SERPL-CCNC: 78 U/L (ref 73–393)
LYMPHOCYTES # BLD AUTO: 3.82 THOUSANDS/ΜL (ref 0.6–4.47)
LYMPHOCYTES # BLD AUTO: 4.1 THOUSANDS/ΜL (ref 0.6–4.47)
LYMPHOCYTES NFR BLD AUTO: 21 % (ref 14–44)
LYMPHOCYTES NFR BLD AUTO: 22 % (ref 14–44)
MCH RBC QN AUTO: 30.3 PG (ref 26.8–34.3)
MCH RBC QN AUTO: 30.7 PG (ref 26.8–34.3)
MCHC RBC AUTO-ENTMCNC: 32.2 G/DL (ref 31.4–37.4)
MCHC RBC AUTO-ENTMCNC: 32.3 G/DL (ref 31.4–37.4)
MCV RBC AUTO: 94 FL (ref 82–98)
MCV RBC AUTO: 95 FL (ref 82–98)
MONOCYTES # BLD AUTO: 1.14 THOUSAND/ΜL (ref 0.17–1.22)
MONOCYTES # BLD AUTO: 1.34 THOUSAND/ΜL (ref 0.17–1.22)
MONOCYTES NFR BLD AUTO: 6 % (ref 4–12)
MONOCYTES NFR BLD AUTO: 7 % (ref 4–12)
NEUTROPHILS # BLD AUTO: 12.76 THOUSANDS/ΜL (ref 1.85–7.62)
NEUTROPHILS # BLD AUTO: 13.28 THOUSANDS/ΜL (ref 1.85–7.62)
NEUTS SEG NFR BLD AUTO: 70 % (ref 43–75)
NEUTS SEG NFR BLD AUTO: 72 % (ref 43–75)
NITRITE UR QL STRIP: NEGATIVE
NON-SQ EPI CELLS URNS QL MICRO: NORMAL /HPF
NRBC BLD AUTO-RTO: 0 /100 WBCS
NRBC BLD AUTO-RTO: 0 /100 WBCS
P AXIS: 70 DEGREES
PH UR STRIP.AUTO: 5.5 [PH]
PLATELET # BLD AUTO: 284 THOUSANDS/UL (ref 149–390)
PLATELET # BLD AUTO: 305 THOUSANDS/UL (ref 149–390)
PMV BLD AUTO: 9 FL (ref 8.9–12.7)
PMV BLD AUTO: 9 FL (ref 8.9–12.7)
POTASSIUM SERPL-SCNC: 3.1 MMOL/L (ref 3.5–5.3)
POTASSIUM SERPL-SCNC: 3.5 MMOL/L (ref 3.5–5.3)
PR INTERVAL: 132 MS
PROT SERPL-MCNC: 7.4 G/DL (ref 6.4–8.2)
PROT UR STRIP-MCNC: ABNORMAL MG/DL
PROTHROMBIN TIME: 14.2 SECONDS (ref 11.8–14.2)
QRS AXIS: 81 DEGREES
QRSD INTERVAL: 90 MS
QT INTERVAL: 344 MS
QTC INTERVAL: 465 MS
RBC # BLD AUTO: 3.32 MILLION/UL (ref 3.81–5.12)
RBC # BLD AUTO: 3.6 MILLION/UL (ref 3.81–5.12)
RBC #/AREA URNS AUTO: NORMAL /HPF
SODIUM SERPL-SCNC: 136 MMOL/L (ref 136–145)
SODIUM SERPL-SCNC: 137 MMOL/L (ref 136–145)
SP GR UR STRIP.AUTO: 1.01 (ref 1–1.03)
T WAVE AXIS: 43 DEGREES
TROPONIN I SERPL-MCNC: <0.02 NG/ML
UROBILINOGEN UR QL STRIP.AUTO: 0.2 E.U./DL
VENTRICULAR RATE: 110 BPM
WBC # BLD AUTO: 18.44 THOUSAND/UL (ref 4.31–10.16)
WBC # BLD AUTO: 18.46 THOUSAND/UL (ref 4.31–10.16)
WBC #/AREA URNS AUTO: NORMAL /HPF

## 2019-04-12 PROCEDURE — 96374 THER/PROPH/DIAG INJ IV PUSH: CPT

## 2019-04-12 PROCEDURE — 81001 URINALYSIS AUTO W/SCOPE: CPT | Performed by: EMERGENCY MEDICINE

## 2019-04-12 PROCEDURE — 87086 URINE CULTURE/COLONY COUNT: CPT | Performed by: EMERGENCY MEDICINE

## 2019-04-12 PROCEDURE — 94640 AIRWAY INHALATION TREATMENT: CPT

## 2019-04-12 PROCEDURE — 99223 1ST HOSP IP/OBS HIGH 75: CPT | Performed by: HOSPITALIST

## 2019-04-12 PROCEDURE — 99285 EMERGENCY DEPT VISIT HI MDM: CPT

## 2019-04-12 PROCEDURE — 84484 ASSAY OF TROPONIN QUANT: CPT | Performed by: EMERGENCY MEDICINE

## 2019-04-12 PROCEDURE — 94760 N-INVAS EAR/PLS OXIMETRY 1: CPT

## 2019-04-12 PROCEDURE — 83605 ASSAY OF LACTIC ACID: CPT | Performed by: EMERGENCY MEDICINE

## 2019-04-12 PROCEDURE — 92610 EVALUATE SWALLOWING FUNCTION: CPT

## 2019-04-12 PROCEDURE — 80076 HEPATIC FUNCTION PANEL: CPT | Performed by: EMERGENCY MEDICINE

## 2019-04-12 PROCEDURE — 74174 CTA ABD&PLVS W/CONTRAST: CPT

## 2019-04-12 PROCEDURE — 80048 BASIC METABOLIC PNL TOTAL CA: CPT | Performed by: HOSPITALIST

## 2019-04-12 PROCEDURE — 1123F ACP DISCUSS/DSCN MKR DOCD: CPT | Performed by: EMERGENCY MEDICINE

## 2019-04-12 PROCEDURE — 83690 ASSAY OF LIPASE: CPT | Performed by: EMERGENCY MEDICINE

## 2019-04-12 PROCEDURE — 93010 ELECTROCARDIOGRAM REPORT: CPT | Performed by: INTERNAL MEDICINE

## 2019-04-12 PROCEDURE — 85730 THROMBOPLASTIN TIME PARTIAL: CPT | Performed by: EMERGENCY MEDICINE

## 2019-04-12 PROCEDURE — 85610 PROTHROMBIN TIME: CPT | Performed by: EMERGENCY MEDICINE

## 2019-04-12 PROCEDURE — 36415 COLL VENOUS BLD VENIPUNCTURE: CPT | Performed by: EMERGENCY MEDICINE

## 2019-04-12 PROCEDURE — 96361 HYDRATE IV INFUSION ADD-ON: CPT

## 2019-04-12 PROCEDURE — 71275 CT ANGIOGRAPHY CHEST: CPT

## 2019-04-12 PROCEDURE — 85025 COMPLETE CBC W/AUTO DIFF WBC: CPT | Performed by: EMERGENCY MEDICINE

## 2019-04-12 PROCEDURE — 99285 EMERGENCY DEPT VISIT HI MDM: CPT | Performed by: EMERGENCY MEDICINE

## 2019-04-12 PROCEDURE — 85025 COMPLETE CBC W/AUTO DIFF WBC: CPT | Performed by: HOSPITALIST

## 2019-04-12 PROCEDURE — 80048 BASIC METABOLIC PNL TOTAL CA: CPT | Performed by: EMERGENCY MEDICINE

## 2019-04-12 PROCEDURE — 93005 ELECTROCARDIOGRAM TRACING: CPT

## 2019-04-12 PROCEDURE — 96375 TX/PRO/DX INJ NEW DRUG ADDON: CPT

## 2019-04-12 RX ORDER — BUDESONIDE AND FORMOTEROL FUMARATE DIHYDRATE 160; 4.5 UG/1; UG/1
2 AEROSOL RESPIRATORY (INHALATION) 2 TIMES DAILY
Status: DISCONTINUED | OUTPATIENT
Start: 2019-04-12 | End: 2019-04-15

## 2019-04-12 RX ORDER — LEVALBUTEROL 1.25 MG/.5ML
1.25 SOLUTION, CONCENTRATE RESPIRATORY (INHALATION)
Status: DISCONTINUED | OUTPATIENT
Start: 2019-04-12 | End: 2019-04-13

## 2019-04-12 RX ORDER — VENLAFAXINE HYDROCHLORIDE 37.5 MG/1
150 CAPSULE, EXTENDED RELEASE ORAL DAILY
Status: DISCONTINUED | OUTPATIENT
Start: 2019-04-12 | End: 2019-04-18 | Stop reason: HOSPADM

## 2019-04-12 RX ORDER — GUAIFENESIN 600 MG
600 TABLET, EXTENDED RELEASE 12 HR ORAL 2 TIMES DAILY
Status: DISCONTINUED | OUTPATIENT
Start: 2019-04-12 | End: 2019-04-18 | Stop reason: HOSPADM

## 2019-04-12 RX ORDER — ONDANSETRON 2 MG/ML
4 INJECTION INTRAMUSCULAR; INTRAVENOUS EVERY 6 HOURS PRN
Status: DISCONTINUED | OUTPATIENT
Start: 2019-04-12 | End: 2019-04-18 | Stop reason: HOSPADM

## 2019-04-12 RX ORDER — CARVEDILOL 12.5 MG/1
25 TABLET ORAL 2 TIMES DAILY WITH MEALS
Status: DISCONTINUED | OUTPATIENT
Start: 2019-04-12 | End: 2019-04-18 | Stop reason: HOSPADM

## 2019-04-12 RX ORDER — SODIUM CHLORIDE FOR INHALATION 0.9 %
3 VIAL, NEBULIZER (ML) INHALATION
Status: DISCONTINUED | OUTPATIENT
Start: 2019-04-12 | End: 2019-04-12

## 2019-04-12 RX ORDER — AMLODIPINE BESYLATE 5 MG/1
5 TABLET ORAL DAILY
Status: DISCONTINUED | OUTPATIENT
Start: 2019-04-12 | End: 2019-04-18 | Stop reason: HOSPADM

## 2019-04-12 RX ORDER — ACETAMINOPHEN 325 MG/1
650 TABLET ORAL EVERY 6 HOURS PRN
Status: DISCONTINUED | OUTPATIENT
Start: 2019-04-12 | End: 2019-04-18 | Stop reason: HOSPADM

## 2019-04-12 RX ORDER — SODIUM CHLORIDE 9 MG/ML
75 INJECTION, SOLUTION INTRAVENOUS CONTINUOUS
Status: DISPENSED | OUTPATIENT
Start: 2019-04-12 | End: 2019-04-13

## 2019-04-12 RX ORDER — PRASUGREL 10 MG/1
10 TABLET, FILM COATED ORAL DAILY
Status: DISCONTINUED | OUTPATIENT
Start: 2019-04-12 | End: 2019-04-18 | Stop reason: HOSPADM

## 2019-04-12 RX ORDER — METHYLPREDNISOLONE SODIUM SUCCINATE 40 MG/ML
20 INJECTION, POWDER, LYOPHILIZED, FOR SOLUTION INTRAMUSCULAR; INTRAVENOUS 3 TIMES DAILY
Status: DISCONTINUED | OUTPATIENT
Start: 2019-04-12 | End: 2019-04-13

## 2019-04-12 RX ORDER — MORPHINE SULFATE 4 MG/ML
4 INJECTION, SOLUTION INTRAMUSCULAR; INTRAVENOUS ONCE
Status: COMPLETED | OUTPATIENT
Start: 2019-04-12 | End: 2019-04-12

## 2019-04-12 RX ORDER — LISINOPRIL 20 MG/1
40 TABLET ORAL DAILY
Status: DISCONTINUED | OUTPATIENT
Start: 2019-04-12 | End: 2019-04-18 | Stop reason: HOSPADM

## 2019-04-12 RX ORDER — POTASSIUM CHLORIDE 20 MEQ/1
40 TABLET, EXTENDED RELEASE ORAL ONCE
Status: COMPLETED | OUTPATIENT
Start: 2019-04-12 | End: 2019-04-12

## 2019-04-12 RX ORDER — VANCOMYCIN HYDROCHLORIDE 1 G/200ML
15 INJECTION, SOLUTION INTRAVENOUS ONCE
Status: COMPLETED | OUTPATIENT
Start: 2019-04-12 | End: 2019-04-12

## 2019-04-12 RX ORDER — METHOCARBAMOL 500 MG/1
500 TABLET, FILM COATED ORAL EVERY 6 HOURS PRN
Status: DISCONTINUED | OUTPATIENT
Start: 2019-04-12 | End: 2019-04-18 | Stop reason: HOSPADM

## 2019-04-12 RX ORDER — CALCIUM CARBONATE 500(1250)
2 TABLET ORAL
Status: DISCONTINUED | OUTPATIENT
Start: 2019-04-12 | End: 2019-04-18 | Stop reason: HOSPADM

## 2019-04-12 RX ORDER — ASCORBIC ACID 500 MG
500 TABLET ORAL DAILY
Status: DISCONTINUED | OUTPATIENT
Start: 2019-04-12 | End: 2019-04-18 | Stop reason: HOSPADM

## 2019-04-12 RX ADMIN — SODIUM CHLORIDE 500 ML: 0.9 INJECTION, SOLUTION INTRAVENOUS at 03:00

## 2019-04-12 RX ADMIN — MORPHINE SULFATE 4 MG: 4 INJECTION INTRAVENOUS at 03:02

## 2019-04-12 RX ADMIN — CALCIUM 2 TABLET: 500 TABLET ORAL at 08:44

## 2019-04-12 RX ADMIN — METHYLPREDNISOLONE SODIUM SUCCINATE 20 MG: 40 INJECTION, POWDER, FOR SOLUTION INTRAMUSCULAR; INTRAVENOUS at 09:39

## 2019-04-12 RX ADMIN — AMPICILLIN SODIUM AND SULBACTAM SODIUM 1.5 G: 1; .5 INJECTION, POWDER, FOR SOLUTION INTRAMUSCULAR; INTRAVENOUS at 16:05

## 2019-04-12 RX ADMIN — METHYLPREDNISOLONE SODIUM SUCCINATE 20 MG: 40 INJECTION, POWDER, FOR SOLUTION INTRAMUSCULAR; INTRAVENOUS at 21:07

## 2019-04-12 RX ADMIN — BUDESONIDE AND FORMOTEROL FUMARATE DIHYDRATE 2 PUFF: 160; 4.5 AEROSOL RESPIRATORY (INHALATION) at 09:37

## 2019-04-12 RX ADMIN — VANCOMYCIN HYDROCHLORIDE 1000 MG: 1 INJECTION, SOLUTION INTRAVENOUS at 06:03

## 2019-04-12 RX ADMIN — ISODIUM CHLORIDE 3 ML: 0.03 SOLUTION RESPIRATORY (INHALATION) at 13:41

## 2019-04-12 RX ADMIN — AZITHROMYCIN MONOHYDRATE 500 MG: 500 INJECTION, POWDER, LYOPHILIZED, FOR SOLUTION INTRAVENOUS at 04:45

## 2019-04-12 RX ADMIN — LEVALBUTEROL HYDROCHLORIDE 1.25 MG: 1.25 SOLUTION, CONCENTRATE RESPIRATORY (INHALATION) at 08:46

## 2019-04-12 RX ADMIN — OXYCODONE HYDROCHLORIDE AND ACETAMINOPHEN 500 MG: 500 TABLET ORAL at 09:38

## 2019-04-12 RX ADMIN — IPRATROPIUM BROMIDE 0.5 MG: 0.5 SOLUTION RESPIRATORY (INHALATION) at 08:46

## 2019-04-12 RX ADMIN — IPRATROPIUM BROMIDE 0.5 MG: 0.5 SOLUTION RESPIRATORY (INHALATION) at 20:19

## 2019-04-12 RX ADMIN — MORPHINE SULFATE 2 MG: 2 INJECTION, SOLUTION INTRAMUSCULAR; INTRAVENOUS at 16:01

## 2019-04-12 RX ADMIN — CARVEDILOL 25 MG: 12.5 TABLET, FILM COATED ORAL at 08:44

## 2019-04-12 RX ADMIN — ALBUTEROL SULFATE 5 MG: 2.5 SOLUTION RESPIRATORY (INHALATION) at 03:03

## 2019-04-12 RX ADMIN — LISINOPRIL 40 MG: 20 TABLET ORAL at 09:39

## 2019-04-12 RX ADMIN — VENLAFAXINE HYDROCHLORIDE 150 MG: 37.5 CAPSULE, EXTENDED RELEASE ORAL at 09:38

## 2019-04-12 RX ADMIN — POTASSIUM CHLORIDE 40 MEQ: 1500 TABLET, EXTENDED RELEASE ORAL at 03:27

## 2019-04-12 RX ADMIN — LEVALBUTEROL HYDROCHLORIDE 1.25 MG: 1.25 SOLUTION, CONCENTRATE RESPIRATORY (INHALATION) at 20:19

## 2019-04-12 RX ADMIN — GUAIFENESIN 600 MG: 600 TABLET, EXTENDED RELEASE ORAL at 19:09

## 2019-04-12 RX ADMIN — CEFEPIME 2000 MG: 2 INJECTION, POWDER, FOR SOLUTION INTRAVENOUS at 04:05

## 2019-04-12 RX ADMIN — AMLODIPINE BESYLATE 5 MG: 5 TABLET ORAL at 09:38

## 2019-04-12 RX ADMIN — BUDESONIDE AND FORMOTEROL FUMARATE DIHYDRATE 2 PUFF: 160; 4.5 AEROSOL RESPIRATORY (INHALATION) at 19:10

## 2019-04-12 RX ADMIN — METHYLPREDNISOLONE SODIUM SUCCINATE 20 MG: 40 INJECTION, POWDER, FOR SOLUTION INTRAMUSCULAR; INTRAVENOUS at 16:04

## 2019-04-12 RX ADMIN — GUAIFENESIN 600 MG: 600 TABLET, EXTENDED RELEASE ORAL at 09:38

## 2019-04-12 RX ADMIN — MORPHINE SULFATE 2 MG: 2 INJECTION, SOLUTION INTRAMUSCULAR; INTRAVENOUS at 11:52

## 2019-04-12 RX ADMIN — ENOXAPARIN SODIUM 40 MG: 40 INJECTION SUBCUTANEOUS at 09:39

## 2019-04-12 RX ADMIN — AMPICILLIN SODIUM AND SULBACTAM SODIUM 1.5 G: 1; .5 INJECTION, POWDER, FOR SOLUTION INTRAMUSCULAR; INTRAVENOUS at 21:12

## 2019-04-12 RX ADMIN — IPRATROPIUM BROMIDE 0.5 MG: 0.5 SOLUTION RESPIRATORY (INHALATION) at 13:41

## 2019-04-12 RX ADMIN — VANCOMYCIN HYDROCHLORIDE 750 MG: 750 INJECTION, SOLUTION INTRAVENOUS at 19:04

## 2019-04-12 RX ADMIN — MORPHINE SULFATE 2 MG: 2 INJECTION, SOLUTION INTRAMUSCULAR; INTRAVENOUS at 06:41

## 2019-04-12 RX ADMIN — PRASUGREL HYDROCHLORIDE 10 MG: 10 TABLET, FILM COATED ORAL at 09:38

## 2019-04-12 RX ADMIN — ISODIUM CHLORIDE 3 ML: 0.03 SOLUTION RESPIRATORY (INHALATION) at 08:46

## 2019-04-12 RX ADMIN — SODIUM CHLORIDE 75 ML/HR: 0.9 INJECTION, SOLUTION INTRAVENOUS at 04:44

## 2019-04-12 RX ADMIN — LEVALBUTEROL HYDROCHLORIDE 1.25 MG: 1.25 SOLUTION, CONCENTRATE RESPIRATORY (INHALATION) at 13:41

## 2019-04-12 RX ADMIN — IOHEXOL 100 ML: 350 INJECTION, SOLUTION INTRAVENOUS at 03:33

## 2019-04-12 RX ADMIN — CARVEDILOL 25 MG: 12.5 TABLET, FILM COATED ORAL at 16:47

## 2019-04-12 RX ADMIN — AMPICILLIN SODIUM AND SULBACTAM SODIUM 1.5 G: 1; .5 INJECTION, POWDER, FOR SOLUTION INTRAMUSCULAR; INTRAVENOUS at 09:46

## 2019-04-12 RX ADMIN — IPRATROPIUM BROMIDE 0.5 MG: 0.5 SOLUTION RESPIRATORY (INHALATION) at 03:03

## 2019-04-13 LAB
ANION GAP SERPL CALCULATED.3IONS-SCNC: 8 MMOL/L (ref 4–13)
BACTERIA UR CULT: NORMAL
BASOPHILS # BLD MANUAL: 0 THOUSAND/UL (ref 0–0.1)
BASOPHILS NFR MAR MANUAL: 0 % (ref 0–1)
BUN SERPL-MCNC: 21 MG/DL (ref 5–25)
CALCIUM SERPL-MCNC: 8.9 MG/DL (ref 8.3–10.1)
CHLORIDE SERPL-SCNC: 103 MMOL/L (ref 100–108)
CO2 SERPL-SCNC: 27 MMOL/L (ref 21–32)
CREAT SERPL-MCNC: 0.75 MG/DL (ref 0.6–1.3)
EOSINOPHIL # BLD MANUAL: 0 THOUSAND/UL (ref 0–0.4)
EOSINOPHIL NFR BLD MANUAL: 0 % (ref 0–6)
ERYTHROCYTE [DISTWIDTH] IN BLOOD BY AUTOMATED COUNT: 13.8 % (ref 11.6–15.1)
GFR SERPL CREATININE-BSD FRML MDRD: 80 ML/MIN/1.73SQ M
GLUCOSE SERPL-MCNC: 144 MG/DL (ref 65–140)
HCT VFR BLD AUTO: 30.4 % (ref 34.8–46.1)
HGB BLD-MCNC: 9.8 G/DL (ref 11.5–15.4)
LYMPHOCYTES # BLD AUTO: 17 % (ref 14–44)
LYMPHOCYTES # BLD AUTO: 2.04 THOUSAND/UL (ref 0.6–4.47)
MCH RBC QN AUTO: 30.4 PG (ref 26.8–34.3)
MCHC RBC AUTO-ENTMCNC: 32.2 G/DL (ref 31.4–37.4)
MCV RBC AUTO: 94 FL (ref 82–98)
MONOCYTES # BLD AUTO: 0.36 THOUSAND/UL (ref 0–1.22)
MONOCYTES NFR BLD: 3 % (ref 4–12)
NEUTROPHILS # BLD MANUAL: 9.58 THOUSAND/UL (ref 1.85–7.62)
NEUTS BAND NFR BLD MANUAL: 1 % (ref 0–8)
NEUTS SEG NFR BLD AUTO: 79 % (ref 43–75)
NRBC BLD AUTO-RTO: 0 /100 WBCS
PLATELET # BLD AUTO: 289 THOUSANDS/UL (ref 149–390)
PLATELET BLD QL SMEAR: ADEQUATE
PMV BLD AUTO: 9.2 FL (ref 8.9–12.7)
POTASSIUM SERPL-SCNC: 3.6 MMOL/L (ref 3.5–5.3)
RBC # BLD AUTO: 3.22 MILLION/UL (ref 3.81–5.12)
RBC MORPH BLD: NORMAL
SODIUM SERPL-SCNC: 138 MMOL/L (ref 136–145)
TOTAL CELLS COUNTED SPEC: 100
WBC # BLD AUTO: 11.98 THOUSAND/UL (ref 4.31–10.16)

## 2019-04-13 PROCEDURE — 80048 BASIC METABOLIC PNL TOTAL CA: CPT | Performed by: HOSPITALIST

## 2019-04-13 PROCEDURE — 99233 SBSQ HOSP IP/OBS HIGH 50: CPT | Performed by: INTERNAL MEDICINE

## 2019-04-13 PROCEDURE — 94760 N-INVAS EAR/PLS OXIMETRY 1: CPT

## 2019-04-13 PROCEDURE — 87070 CULTURE OTHR SPECIMN AEROBIC: CPT | Performed by: HOSPITALIST

## 2019-04-13 PROCEDURE — 87205 SMEAR GRAM STAIN: CPT | Performed by: HOSPITALIST

## 2019-04-13 PROCEDURE — 87040 BLOOD CULTURE FOR BACTERIA: CPT | Performed by: INTERNAL MEDICINE

## 2019-04-13 PROCEDURE — 87147 CULTURE TYPE IMMUNOLOGIC: CPT | Performed by: HOSPITALIST

## 2019-04-13 PROCEDURE — 85007 BL SMEAR W/DIFF WBC COUNT: CPT | Performed by: HOSPITALIST

## 2019-04-13 PROCEDURE — 87186 SC STD MICRODIL/AGAR DIL: CPT | Performed by: HOSPITALIST

## 2019-04-13 PROCEDURE — 85027 COMPLETE CBC AUTOMATED: CPT | Performed by: HOSPITALIST

## 2019-04-13 PROCEDURE — 94640 AIRWAY INHALATION TREATMENT: CPT

## 2019-04-13 PROCEDURE — 84145 PROCALCITONIN (PCT): CPT | Performed by: INTERNAL MEDICINE

## 2019-04-13 RX ORDER — AZITHROMYCIN 250 MG/1
500 TABLET, FILM COATED ORAL EVERY 24 HOURS
Status: DISCONTINUED | OUTPATIENT
Start: 2019-04-13 | End: 2019-04-15

## 2019-04-13 RX ORDER — LEVALBUTEROL 1.25 MG/.5ML
1.25 SOLUTION, CONCENTRATE RESPIRATORY (INHALATION)
Status: DISCONTINUED | OUTPATIENT
Start: 2019-04-13 | End: 2019-04-18 | Stop reason: HOSPADM

## 2019-04-13 RX ORDER — LEVALBUTEROL 1.25 MG/.5ML
1.25 SOLUTION, CONCENTRATE RESPIRATORY (INHALATION)
Status: DISCONTINUED | OUTPATIENT
Start: 2019-04-13 | End: 2019-04-13

## 2019-04-13 RX ORDER — METHYLPREDNISOLONE SODIUM SUCCINATE 40 MG/ML
40 INJECTION, POWDER, LYOPHILIZED, FOR SOLUTION INTRAMUSCULAR; INTRAVENOUS EVERY 6 HOURS SCHEDULED
Status: DISCONTINUED | OUTPATIENT
Start: 2019-04-13 | End: 2019-04-15

## 2019-04-13 RX ADMIN — VENLAFAXINE HYDROCHLORIDE 150 MG: 37.5 CAPSULE, EXTENDED RELEASE ORAL at 08:42

## 2019-04-13 RX ADMIN — MORPHINE SULFATE 2 MG: 2 INJECTION, SOLUTION INTRAMUSCULAR; INTRAVENOUS at 13:27

## 2019-04-13 RX ADMIN — GUAIFENESIN 600 MG: 600 TABLET, EXTENDED RELEASE ORAL at 17:23

## 2019-04-13 RX ADMIN — PRASUGREL HYDROCHLORIDE 10 MG: 10 TABLET, FILM COATED ORAL at 08:42

## 2019-04-13 RX ADMIN — LEVALBUTEROL HYDROCHLORIDE 1.25 MG: 1.25 SOLUTION, CONCENTRATE RESPIRATORY (INHALATION) at 13:15

## 2019-04-13 RX ADMIN — METHYLPREDNISOLONE SODIUM SUCCINATE 40 MG: 40 INJECTION, POWDER, FOR SOLUTION INTRAMUSCULAR; INTRAVENOUS at 17:24

## 2019-04-13 RX ADMIN — LISINOPRIL 40 MG: 20 TABLET ORAL at 08:41

## 2019-04-13 RX ADMIN — IPRATROPIUM BROMIDE 0.5 MG: 0.5 SOLUTION RESPIRATORY (INHALATION) at 07:46

## 2019-04-13 RX ADMIN — GUAIFENESIN 600 MG: 600 TABLET, EXTENDED RELEASE ORAL at 08:41

## 2019-04-13 RX ADMIN — LEVALBUTEROL HYDROCHLORIDE 1.25 MG: 1.25 SOLUTION, CONCENTRATE RESPIRATORY (INHALATION) at 20:08

## 2019-04-13 RX ADMIN — BUDESONIDE AND FORMOTEROL FUMARATE DIHYDRATE 2 PUFF: 160; 4.5 AEROSOL RESPIRATORY (INHALATION) at 11:22

## 2019-04-13 RX ADMIN — CARVEDILOL 25 MG: 12.5 TABLET, FILM COATED ORAL at 17:23

## 2019-04-13 RX ADMIN — MORPHINE SULFATE 2 MG: 2 INJECTION, SOLUTION INTRAMUSCULAR; INTRAVENOUS at 22:17

## 2019-04-13 RX ADMIN — IPRATROPIUM BROMIDE 0.5 MG: 0.5 SOLUTION RESPIRATORY (INHALATION) at 13:15

## 2019-04-13 RX ADMIN — ENOXAPARIN SODIUM 40 MG: 40 INJECTION SUBCUTANEOUS at 08:49

## 2019-04-13 RX ADMIN — LEVALBUTEROL HYDROCHLORIDE 1.25 MG: 1.25 SOLUTION, CONCENTRATE RESPIRATORY (INHALATION) at 07:46

## 2019-04-13 RX ADMIN — MORPHINE SULFATE 2 MG: 2 INJECTION, SOLUTION INTRAMUSCULAR; INTRAVENOUS at 06:27

## 2019-04-13 RX ADMIN — AMLODIPINE BESYLATE 5 MG: 5 TABLET ORAL at 08:42

## 2019-04-13 RX ADMIN — CEFEPIME HYDROCHLORIDE 1000 MG: 1 INJECTION, POWDER, FOR SOLUTION INTRAMUSCULAR; INTRAVENOUS at 14:25

## 2019-04-13 RX ADMIN — VANCOMYCIN HYDROCHLORIDE 750 MG: 750 INJECTION, SOLUTION INTRAVENOUS at 06:23

## 2019-04-13 RX ADMIN — CALCIUM 2 TABLET: 500 TABLET ORAL at 08:41

## 2019-04-13 RX ADMIN — OXYCODONE HYDROCHLORIDE AND ACETAMINOPHEN 500 MG: 500 TABLET ORAL at 08:42

## 2019-04-13 RX ADMIN — METHYLPREDNISOLONE SODIUM SUCCINATE 20 MG: 40 INJECTION, POWDER, FOR SOLUTION INTRAMUSCULAR; INTRAVENOUS at 08:46

## 2019-04-13 RX ADMIN — IPRATROPIUM BROMIDE 0.5 MG: 0.5 SOLUTION RESPIRATORY (INHALATION) at 20:08

## 2019-04-13 RX ADMIN — AMPICILLIN SODIUM AND SULBACTAM SODIUM 1.5 G: 1; .5 INJECTION, POWDER, FOR SOLUTION INTRAMUSCULAR; INTRAVENOUS at 04:50

## 2019-04-13 RX ADMIN — METHOCARBAMOL TABLETS 500 MG: 500 TABLET, COATED ORAL at 07:19

## 2019-04-13 RX ADMIN — AZITHROMYCIN 500 MG: 250 TABLET, FILM COATED ORAL at 14:24

## 2019-04-13 RX ADMIN — CARVEDILOL 25 MG: 12.5 TABLET, FILM COATED ORAL at 08:42

## 2019-04-13 RX ADMIN — AMPICILLIN SODIUM AND SULBACTAM SODIUM 1.5 G: 1; .5 INJECTION, POWDER, FOR SOLUTION INTRAMUSCULAR; INTRAVENOUS at 11:19

## 2019-04-13 RX ADMIN — BUDESONIDE AND FORMOTEROL FUMARATE DIHYDRATE 2 PUFF: 160; 4.5 AEROSOL RESPIRATORY (INHALATION) at 17:24

## 2019-04-14 LAB
PROCALCITONIN SERPL-MCNC: 0.43 NG/ML
PROCALCITONIN SERPL-MCNC: 0.66 NG/ML

## 2019-04-14 PROCEDURE — 99233 SBSQ HOSP IP/OBS HIGH 50: CPT | Performed by: INTERNAL MEDICINE

## 2019-04-14 PROCEDURE — 94760 N-INVAS EAR/PLS OXIMETRY 1: CPT

## 2019-04-14 PROCEDURE — 84145 PROCALCITONIN (PCT): CPT | Performed by: INTERNAL MEDICINE

## 2019-04-14 PROCEDURE — 94640 AIRWAY INHALATION TREATMENT: CPT

## 2019-04-14 RX ORDER — LANOLIN ALCOHOL/MO/W.PET/CERES
9 CREAM (GRAM) TOPICAL ONCE
Status: COMPLETED | OUTPATIENT
Start: 2019-04-14 | End: 2019-04-14

## 2019-04-14 RX ORDER — LANOLIN ALCOHOL/MO/W.PET/CERES
9 CREAM (GRAM) TOPICAL
Status: DISCONTINUED | OUTPATIENT
Start: 2019-04-14 | End: 2019-04-18 | Stop reason: HOSPADM

## 2019-04-14 RX ORDER — ALENDRONATE SODIUM 35 MG/1
35 TABLET ORAL
COMMUNITY

## 2019-04-14 RX ORDER — LIDOCAINE 50 MG/G
2 PATCH TOPICAL DAILY
Status: DISCONTINUED | OUTPATIENT
Start: 2019-04-14 | End: 2019-04-18 | Stop reason: HOSPADM

## 2019-04-14 RX ORDER — ALENDRONATE SODIUM 35 MG/1
35 TABLET ORAL
Status: DISCONTINUED | OUTPATIENT
Start: 2019-04-14 | End: 2019-04-15

## 2019-04-14 RX ADMIN — CALCIUM 2 TABLET: 500 TABLET ORAL at 08:36

## 2019-04-14 RX ADMIN — METHOCARBAMOL TABLETS 500 MG: 500 TABLET, COATED ORAL at 00:57

## 2019-04-14 RX ADMIN — METHYLPREDNISOLONE SODIUM SUCCINATE 40 MG: 40 INJECTION, POWDER, FOR SOLUTION INTRAMUSCULAR; INTRAVENOUS at 00:58

## 2019-04-14 RX ADMIN — CEFEPIME HYDROCHLORIDE 1000 MG: 1 INJECTION, POWDER, FOR SOLUTION INTRAMUSCULAR; INTRAVENOUS at 01:12

## 2019-04-14 RX ADMIN — OXYCODONE HYDROCHLORIDE AND ACETAMINOPHEN 500 MG: 500 TABLET ORAL at 08:37

## 2019-04-14 RX ADMIN — ENOXAPARIN SODIUM 40 MG: 40 INJECTION SUBCUTANEOUS at 08:37

## 2019-04-14 RX ADMIN — MORPHINE SULFATE 2 MG: 2 INJECTION, SOLUTION INTRAMUSCULAR; INTRAVENOUS at 23:09

## 2019-04-14 RX ADMIN — LEVALBUTEROL HYDROCHLORIDE 1.25 MG: 1.25 SOLUTION, CONCENTRATE RESPIRATORY (INHALATION) at 07:29

## 2019-04-14 RX ADMIN — METHYLPREDNISOLONE SODIUM SUCCINATE 40 MG: 40 INJECTION, POWDER, FOR SOLUTION INTRAMUSCULAR; INTRAVENOUS at 05:29

## 2019-04-14 RX ADMIN — PRASUGREL HYDROCHLORIDE 10 MG: 10 TABLET, FILM COATED ORAL at 08:37

## 2019-04-14 RX ADMIN — LISINOPRIL 40 MG: 20 TABLET ORAL at 08:36

## 2019-04-14 RX ADMIN — CEFEPIME HYDROCHLORIDE 1000 MG: 1 INJECTION, POWDER, FOR SOLUTION INTRAMUSCULAR; INTRAVENOUS at 13:21

## 2019-04-14 RX ADMIN — GUAIFENESIN 600 MG: 600 TABLET, EXTENDED RELEASE ORAL at 08:36

## 2019-04-14 RX ADMIN — CARVEDILOL 25 MG: 12.5 TABLET, FILM COATED ORAL at 08:36

## 2019-04-14 RX ADMIN — MELATONIN 9 MG: at 23:07

## 2019-04-14 RX ADMIN — MORPHINE SULFATE 2 MG: 2 INJECTION, SOLUTION INTRAMUSCULAR; INTRAVENOUS at 02:30

## 2019-04-14 RX ADMIN — LEVALBUTEROL HYDROCHLORIDE 1.25 MG: 1.25 SOLUTION, CONCENTRATE RESPIRATORY (INHALATION) at 13:24

## 2019-04-14 RX ADMIN — AZITHROMYCIN 500 MG: 250 TABLET, FILM COATED ORAL at 13:21

## 2019-04-14 RX ADMIN — LEVALBUTEROL HYDROCHLORIDE 1.25 MG: 1.25 SOLUTION, CONCENTRATE RESPIRATORY (INHALATION) at 19:53

## 2019-04-14 RX ADMIN — CARVEDILOL 25 MG: 12.5 TABLET, FILM COATED ORAL at 17:23

## 2019-04-14 RX ADMIN — IPRATROPIUM BROMIDE 0.5 MG: 0.5 SOLUTION RESPIRATORY (INHALATION) at 19:53

## 2019-04-14 RX ADMIN — AMLODIPINE BESYLATE 5 MG: 5 TABLET ORAL at 08:36

## 2019-04-14 RX ADMIN — MELATONIN 9 MG: at 04:02

## 2019-04-14 RX ADMIN — METHYLPREDNISOLONE SODIUM SUCCINATE 40 MG: 40 INJECTION, POWDER, FOR SOLUTION INTRAMUSCULAR; INTRAVENOUS at 17:23

## 2019-04-14 RX ADMIN — IPRATROPIUM BROMIDE 0.5 MG: 0.5 SOLUTION RESPIRATORY (INHALATION) at 07:29

## 2019-04-14 RX ADMIN — METHYLPREDNISOLONE SODIUM SUCCINATE 40 MG: 40 INJECTION, POWDER, FOR SOLUTION INTRAMUSCULAR; INTRAVENOUS at 23:08

## 2019-04-14 RX ADMIN — IPRATROPIUM BROMIDE 0.5 MG: 0.5 SOLUTION RESPIRATORY (INHALATION) at 13:24

## 2019-04-14 RX ADMIN — VENLAFAXINE HYDROCHLORIDE 150 MG: 37.5 CAPSULE, EXTENDED RELEASE ORAL at 08:36

## 2019-04-14 RX ADMIN — MORPHINE SULFATE 2 MG: 2 INJECTION, SOLUTION INTRAMUSCULAR; INTRAVENOUS at 11:31

## 2019-04-14 RX ADMIN — METHYLPREDNISOLONE SODIUM SUCCINATE 40 MG: 40 INJECTION, POWDER, FOR SOLUTION INTRAMUSCULAR; INTRAVENOUS at 11:32

## 2019-04-14 RX ADMIN — LIDOCAINE 2 PATCH: 50 PATCH TOPICAL at 11:30

## 2019-04-14 RX ADMIN — GUAIFENESIN 600 MG: 600 TABLET, EXTENDED RELEASE ORAL at 17:22

## 2019-04-15 LAB — PROCALCITONIN SERPL-MCNC: 0.15 NG/ML

## 2019-04-15 PROCEDURE — 94760 N-INVAS EAR/PLS OXIMETRY 1: CPT

## 2019-04-15 PROCEDURE — 94640 AIRWAY INHALATION TREATMENT: CPT

## 2019-04-15 PROCEDURE — 99232 SBSQ HOSP IP/OBS MODERATE 35: CPT | Performed by: INTERNAL MEDICINE

## 2019-04-15 PROCEDURE — 99222 1ST HOSP IP/OBS MODERATE 55: CPT | Performed by: PHYSICIAN ASSISTANT

## 2019-04-15 PROCEDURE — 84145 PROCALCITONIN (PCT): CPT | Performed by: INTERNAL MEDICINE

## 2019-04-15 RX ORDER — METHYLPREDNISOLONE SODIUM SUCCINATE 40 MG/ML
40 INJECTION, POWDER, LYOPHILIZED, FOR SOLUTION INTRAMUSCULAR; INTRAVENOUS EVERY 12 HOURS SCHEDULED
Status: DISCONTINUED | OUTPATIENT
Start: 2019-04-16 | End: 2019-04-18 | Stop reason: HOSPADM

## 2019-04-15 RX ORDER — METHYLPREDNISOLONE SODIUM SUCCINATE 40 MG/ML
40 INJECTION, POWDER, LYOPHILIZED, FOR SOLUTION INTRAMUSCULAR; INTRAVENOUS EVERY 8 HOURS SCHEDULED
Status: DISCONTINUED | OUTPATIENT
Start: 2019-04-15 | End: 2019-04-15

## 2019-04-15 RX ADMIN — ENOXAPARIN SODIUM 40 MG: 40 INJECTION SUBCUTANEOUS at 09:32

## 2019-04-15 RX ADMIN — IPRATROPIUM BROMIDE 0.5 MG: 0.5 SOLUTION RESPIRATORY (INHALATION) at 13:20

## 2019-04-15 RX ADMIN — CARVEDILOL 25 MG: 12.5 TABLET, FILM COATED ORAL at 16:58

## 2019-04-15 RX ADMIN — LEVOFLOXACIN 750 MG: 250 TABLET, FILM COATED ORAL at 09:36

## 2019-04-15 RX ADMIN — METHYLPREDNISOLONE SODIUM SUCCINATE 40 MG: 40 INJECTION, POWDER, FOR SOLUTION INTRAMUSCULAR; INTRAVENOUS at 06:32

## 2019-04-15 RX ADMIN — METHYLPREDNISOLONE SODIUM SUCCINATE 40 MG: 40 INJECTION, POWDER, FOR SOLUTION INTRAMUSCULAR; INTRAVENOUS at 14:58

## 2019-04-15 RX ADMIN — GUAIFENESIN 600 MG: 600 TABLET, EXTENDED RELEASE ORAL at 09:32

## 2019-04-15 RX ADMIN — CALCIUM 2 TABLET: 500 TABLET ORAL at 09:31

## 2019-04-15 RX ADMIN — LEVALBUTEROL HYDROCHLORIDE 1.25 MG: 1.25 SOLUTION, CONCENTRATE RESPIRATORY (INHALATION) at 07:21

## 2019-04-15 RX ADMIN — OXYCODONE HYDROCHLORIDE AND ACETAMINOPHEN 500 MG: 500 TABLET ORAL at 09:38

## 2019-04-15 RX ADMIN — LIDOCAINE 2 PATCH: 50 PATCH TOPICAL at 09:32

## 2019-04-15 RX ADMIN — AMLODIPINE BESYLATE 5 MG: 5 TABLET ORAL at 09:40

## 2019-04-15 RX ADMIN — CARVEDILOL 25 MG: 12.5 TABLET, FILM COATED ORAL at 09:30

## 2019-04-15 RX ADMIN — LEVALBUTEROL HYDROCHLORIDE 1.25 MG: 1.25 SOLUTION, CONCENTRATE RESPIRATORY (INHALATION) at 19:14

## 2019-04-15 RX ADMIN — GUAIFENESIN 600 MG: 600 TABLET, EXTENDED RELEASE ORAL at 21:20

## 2019-04-15 RX ADMIN — IPRATROPIUM BROMIDE 0.5 MG: 0.5 SOLUTION RESPIRATORY (INHALATION) at 07:21

## 2019-04-15 RX ADMIN — BUDESONIDE AND FORMOTEROL FUMARATE DIHYDRATE 2 PUFF: 160; 4.5 AEROSOL RESPIRATORY (INHALATION) at 09:38

## 2019-04-15 RX ADMIN — IPRATROPIUM BROMIDE 0.5 MG: 0.5 SOLUTION RESPIRATORY (INHALATION) at 19:14

## 2019-04-15 RX ADMIN — PRASUGREL HYDROCHLORIDE 10 MG: 10 TABLET, FILM COATED ORAL at 09:31

## 2019-04-15 RX ADMIN — CEFEPIME HYDROCHLORIDE 1000 MG: 1 INJECTION, POWDER, FOR SOLUTION INTRAMUSCULAR; INTRAVENOUS at 00:52

## 2019-04-15 RX ADMIN — LEVALBUTEROL HYDROCHLORIDE 1.25 MG: 1.25 SOLUTION, CONCENTRATE RESPIRATORY (INHALATION) at 13:20

## 2019-04-15 RX ADMIN — LISINOPRIL 40 MG: 20 TABLET ORAL at 09:31

## 2019-04-15 RX ADMIN — MELATONIN 9 MG: at 21:20

## 2019-04-15 RX ADMIN — VENLAFAXINE HYDROCHLORIDE 150 MG: 37.5 CAPSULE, EXTENDED RELEASE ORAL at 09:30

## 2019-04-16 LAB
BACTERIA SPT RESP CULT: ABNORMAL
BACTERIA SPT RESP CULT: ABNORMAL
BASOPHILS # BLD MANUAL: 0 THOUSAND/UL (ref 0–0.1)
BASOPHILS NFR MAR MANUAL: 0 % (ref 0–1)
EOSINOPHIL # BLD MANUAL: 0 THOUSAND/UL (ref 0–0.4)
EOSINOPHIL NFR BLD MANUAL: 0 % (ref 0–6)
ERYTHROCYTE [DISTWIDTH] IN BLOOD BY AUTOMATED COUNT: 13.8 % (ref 11.6–15.1)
GRAM STN SPEC: ABNORMAL
HCT VFR BLD AUTO: 34.7 % (ref 34.8–46.1)
HGB BLD-MCNC: 11.2 G/DL (ref 11.5–15.4)
LYMPHOCYTES # BLD AUTO: 28 % (ref 14–44)
LYMPHOCYTES # BLD AUTO: 4.42 THOUSAND/UL (ref 0.6–4.47)
MCH RBC QN AUTO: 30.4 PG (ref 26.8–34.3)
MCHC RBC AUTO-ENTMCNC: 32.3 G/DL (ref 31.4–37.4)
MCV RBC AUTO: 94 FL (ref 82–98)
MONOCYTES # BLD AUTO: 0.32 THOUSAND/UL (ref 0–1.22)
MONOCYTES NFR BLD: 2 % (ref 4–12)
NEUTROPHILS # BLD MANUAL: 10.74 THOUSAND/UL (ref 1.85–7.62)
NEUTS BAND NFR BLD MANUAL: 10 % (ref 0–8)
NEUTS SEG NFR BLD AUTO: 58 % (ref 43–75)
NRBC BLD AUTO-RTO: 0 /100 WBCS
PLATELET # BLD AUTO: 397 THOUSANDS/UL (ref 149–390)
PLATELET BLD QL SMEAR: ADEQUATE
PMV BLD AUTO: 8.7 FL (ref 8.9–12.7)
RBC # BLD AUTO: 3.68 MILLION/UL (ref 3.81–5.12)
TOTAL CELLS COUNTED SPEC: 100
VARIANT LYMPHS # BLD AUTO: 2 %
WBC # BLD AUTO: 15.79 THOUSAND/UL (ref 4.31–10.16)

## 2019-04-16 PROCEDURE — 85027 COMPLETE CBC AUTOMATED: CPT | Performed by: INTERNAL MEDICINE

## 2019-04-16 PROCEDURE — G8979 MOBILITY GOAL STATUS: HCPCS

## 2019-04-16 PROCEDURE — 94640 AIRWAY INHALATION TREATMENT: CPT

## 2019-04-16 PROCEDURE — 94760 N-INVAS EAR/PLS OXIMETRY 1: CPT

## 2019-04-16 PROCEDURE — 82103 ALPHA-1-ANTITRYPSIN TOTAL: CPT | Performed by: INTERNAL MEDICINE

## 2019-04-16 PROCEDURE — 97535 SELF CARE MNGMENT TRAINING: CPT

## 2019-04-16 PROCEDURE — 97163 PT EVAL HIGH COMPLEX 45 MIN: CPT

## 2019-04-16 PROCEDURE — 82784 ASSAY IGA/IGD/IGG/IGM EACH: CPT | Performed by: INTERNAL MEDICINE

## 2019-04-16 PROCEDURE — G8978 MOBILITY CURRENT STATUS: HCPCS

## 2019-04-16 PROCEDURE — 85007 BL SMEAR W/DIFF WBC COUNT: CPT | Performed by: INTERNAL MEDICINE

## 2019-04-16 PROCEDURE — 82785 ASSAY OF IGE: CPT | Performed by: INTERNAL MEDICINE

## 2019-04-16 PROCEDURE — 99232 SBSQ HOSP IP/OBS MODERATE 35: CPT | Performed by: FAMILY MEDICINE

## 2019-04-16 RX ORDER — DOXYCYCLINE HYCLATE 100 MG/1
100 CAPSULE ORAL EVERY 12 HOURS SCHEDULED
Status: DISCONTINUED | OUTPATIENT
Start: 2019-04-16 | End: 2019-04-17

## 2019-04-16 RX ADMIN — IPRATROPIUM BROMIDE 0.5 MG: 0.5 SOLUTION RESPIRATORY (INHALATION) at 07:51

## 2019-04-16 RX ADMIN — CARVEDILOL 25 MG: 12.5 TABLET, FILM COATED ORAL at 09:36

## 2019-04-16 RX ADMIN — GUAIFENESIN 600 MG: 600 TABLET, EXTENDED RELEASE ORAL at 09:35

## 2019-04-16 RX ADMIN — OXYCODONE HYDROCHLORIDE AND ACETAMINOPHEN 500 MG: 500 TABLET ORAL at 09:37

## 2019-04-16 RX ADMIN — AMLODIPINE BESYLATE 5 MG: 5 TABLET ORAL at 09:37

## 2019-04-16 RX ADMIN — METHYLPREDNISOLONE SODIUM SUCCINATE 40 MG: 40 INJECTION, POWDER, FOR SOLUTION INTRAMUSCULAR; INTRAVENOUS at 03:08

## 2019-04-16 RX ADMIN — LISINOPRIL 40 MG: 20 TABLET ORAL at 09:36

## 2019-04-16 RX ADMIN — VENLAFAXINE HYDROCHLORIDE 150 MG: 37.5 CAPSULE, EXTENDED RELEASE ORAL at 09:35

## 2019-04-16 RX ADMIN — LEVOFLOXACIN 750 MG: 250 TABLET, FILM COATED ORAL at 09:37

## 2019-04-16 RX ADMIN — CALCIUM 2 TABLET: 500 TABLET ORAL at 09:37

## 2019-04-16 RX ADMIN — LEVALBUTEROL HYDROCHLORIDE 1.25 MG: 1.25 SOLUTION, CONCENTRATE RESPIRATORY (INHALATION) at 19:41

## 2019-04-16 RX ADMIN — GUAIFENESIN 600 MG: 600 TABLET, EXTENDED RELEASE ORAL at 17:03

## 2019-04-16 RX ADMIN — LEVALBUTEROL HYDROCHLORIDE 1.25 MG: 1.25 SOLUTION, CONCENTRATE RESPIRATORY (INHALATION) at 14:11

## 2019-04-16 RX ADMIN — METHYLPREDNISOLONE SODIUM SUCCINATE 40 MG: 40 INJECTION, POWDER, FOR SOLUTION INTRAMUSCULAR; INTRAVENOUS at 14:16

## 2019-04-16 RX ADMIN — ENOXAPARIN SODIUM 40 MG: 40 INJECTION SUBCUTANEOUS at 09:35

## 2019-04-16 RX ADMIN — DOXYCYCLINE HYCLATE 100 MG: 100 CAPSULE ORAL at 14:16

## 2019-04-16 RX ADMIN — LEVALBUTEROL HYDROCHLORIDE 1.25 MG: 1.25 SOLUTION, CONCENTRATE RESPIRATORY (INHALATION) at 07:51

## 2019-04-16 RX ADMIN — IPRATROPIUM BROMIDE 0.5 MG: 0.5 SOLUTION RESPIRATORY (INHALATION) at 14:11

## 2019-04-16 RX ADMIN — DOXYCYCLINE HYCLATE 100 MG: 100 CAPSULE ORAL at 21:24

## 2019-04-16 RX ADMIN — IPRATROPIUM BROMIDE 0.5 MG: 0.5 SOLUTION RESPIRATORY (INHALATION) at 19:41

## 2019-04-16 RX ADMIN — PRASUGREL HYDROCHLORIDE 10 MG: 10 TABLET, FILM COATED ORAL at 09:36

## 2019-04-16 RX ADMIN — MELATONIN 9 MG: at 21:24

## 2019-04-16 RX ADMIN — CARVEDILOL 25 MG: 12.5 TABLET, FILM COATED ORAL at 17:03

## 2019-04-16 RX ADMIN — LIDOCAINE 2 PATCH: 50 PATCH TOPICAL at 09:35

## 2019-04-17 ENCOUNTER — APPOINTMENT (INPATIENT)
Dept: RADIOLOGY | Facility: HOSPITAL | Age: 74
DRG: 871 | End: 2019-04-17
Payer: MEDICARE

## 2019-04-17 LAB
A1AT SERPL-MCNC: 223 MG/DL (ref 90–200)
BASOPHILS # BLD AUTO: 0.13 THOUSANDS/ΜL (ref 0–0.1)
BASOPHILS NFR BLD AUTO: 1 % (ref 0–1)
EOSINOPHIL # BLD AUTO: 0.01 THOUSAND/ΜL (ref 0–0.61)
EOSINOPHIL NFR BLD AUTO: 0 % (ref 0–6)
ERYTHROCYTE [DISTWIDTH] IN BLOOD BY AUTOMATED COUNT: 13.9 % (ref 11.6–15.1)
HCT VFR BLD AUTO: 35.2 % (ref 34.8–46.1)
HGB BLD-MCNC: 11.5 G/DL (ref 11.5–15.4)
IGA SERPL-MCNC: 203 MG/DL (ref 70–400)
IGG SERPL-MCNC: 891 MG/DL (ref 700–1600)
IMM GRANULOCYTES # BLD AUTO: >0.5 THOUSAND/UL (ref 0–0.2)
IMM GRANULOCYTES NFR BLD AUTO: 12 % (ref 0–2)
LYMPHOCYTES # BLD AUTO: 4.88 THOUSANDS/ΜL (ref 0.6–4.47)
LYMPHOCYTES NFR BLD AUTO: 27 % (ref 14–44)
MCH RBC QN AUTO: 30.6 PG (ref 26.8–34.3)
MCHC RBC AUTO-ENTMCNC: 32.7 G/DL (ref 31.4–37.4)
MCV RBC AUTO: 94 FL (ref 82–98)
MONOCYTES # BLD AUTO: 0.82 THOUSAND/ΜL (ref 0.17–1.22)
MONOCYTES NFR BLD AUTO: 5 % (ref 4–12)
NEUTROPHILS # BLD AUTO: 10.15 THOUSANDS/ΜL (ref 1.85–7.62)
NEUTS SEG NFR BLD AUTO: 55 % (ref 43–75)
NRBC BLD AUTO-RTO: 0 /100 WBCS
PLATELET # BLD AUTO: 405 THOUSANDS/UL (ref 149–390)
PMV BLD AUTO: 8.7 FL (ref 8.9–12.7)
RBC # BLD AUTO: 3.76 MILLION/UL (ref 3.81–5.12)
WBC # BLD AUTO: 18.17 THOUSAND/UL (ref 4.31–10.16)

## 2019-04-17 PROCEDURE — 85025 COMPLETE CBC W/AUTO DIFF WBC: CPT | Performed by: FAMILY MEDICINE

## 2019-04-17 PROCEDURE — 71046 X-RAY EXAM CHEST 2 VIEWS: CPT

## 2019-04-17 PROCEDURE — 99232 SBSQ HOSP IP/OBS MODERATE 35: CPT | Performed by: FAMILY MEDICINE

## 2019-04-17 PROCEDURE — 94640 AIRWAY INHALATION TREATMENT: CPT

## 2019-04-17 PROCEDURE — 94760 N-INVAS EAR/PLS OXIMETRY 1: CPT

## 2019-04-17 PROCEDURE — 99223 1ST HOSP IP/OBS HIGH 75: CPT | Performed by: INTERNAL MEDICINE

## 2019-04-17 PROCEDURE — NC001 PR NO CHARGE: Performed by: INTERNAL MEDICINE

## 2019-04-17 RX ORDER — VANCOMYCIN HYDROCHLORIDE 1 G/200ML
12.5 INJECTION, SOLUTION INTRAVENOUS EVERY 12 HOURS
Status: DISCONTINUED | OUTPATIENT
Start: 2019-04-17 | End: 2019-04-17

## 2019-04-17 RX ORDER — DOXYCYCLINE HYCLATE 100 MG/1
100 CAPSULE ORAL EVERY 12 HOURS SCHEDULED
Status: DISCONTINUED | OUTPATIENT
Start: 2019-04-17 | End: 2019-04-18

## 2019-04-17 RX ORDER — VANCOMYCIN HYDROCHLORIDE 1 G/200ML
15 INJECTION, SOLUTION INTRAVENOUS EVERY 12 HOURS
Status: DISCONTINUED | OUTPATIENT
Start: 2019-04-17 | End: 2019-04-17

## 2019-04-17 RX ADMIN — LEVOFLOXACIN 750 MG: 250 TABLET, FILM COATED ORAL at 09:08

## 2019-04-17 RX ADMIN — MELATONIN 9 MG: at 21:34

## 2019-04-17 RX ADMIN — ENOXAPARIN SODIUM 40 MG: 40 INJECTION SUBCUTANEOUS at 09:10

## 2019-04-17 RX ADMIN — IPRATROPIUM BROMIDE 0.5 MG: 0.5 SOLUTION RESPIRATORY (INHALATION) at 14:08

## 2019-04-17 RX ADMIN — AMLODIPINE BESYLATE 5 MG: 5 TABLET ORAL at 09:09

## 2019-04-17 RX ADMIN — VENLAFAXINE HYDROCHLORIDE 150 MG: 37.5 CAPSULE, EXTENDED RELEASE ORAL at 09:10

## 2019-04-17 RX ADMIN — IPRATROPIUM BROMIDE 0.5 MG: 0.5 SOLUTION RESPIRATORY (INHALATION) at 20:29

## 2019-04-17 RX ADMIN — IPRATROPIUM BROMIDE 0.5 MG: 0.5 SOLUTION RESPIRATORY (INHALATION) at 07:24

## 2019-04-17 RX ADMIN — LEVALBUTEROL HYDROCHLORIDE 1.25 MG: 1.25 SOLUTION, CONCENTRATE RESPIRATORY (INHALATION) at 14:08

## 2019-04-17 RX ADMIN — OXYCODONE HYDROCHLORIDE AND ACETAMINOPHEN 500 MG: 500 TABLET ORAL at 09:10

## 2019-04-17 RX ADMIN — CALCIUM 2 TABLET: 500 TABLET ORAL at 09:09

## 2019-04-17 RX ADMIN — GUAIFENESIN 600 MG: 600 TABLET, EXTENDED RELEASE ORAL at 09:08

## 2019-04-17 RX ADMIN — GUAIFENESIN 600 MG: 600 TABLET, EXTENDED RELEASE ORAL at 18:33

## 2019-04-17 RX ADMIN — DOXYCYCLINE HYCLATE 100 MG: 100 CAPSULE ORAL at 09:08

## 2019-04-17 RX ADMIN — LEVALBUTEROL HYDROCHLORIDE 1.25 MG: 1.25 SOLUTION, CONCENTRATE RESPIRATORY (INHALATION) at 07:24

## 2019-04-17 RX ADMIN — PRASUGREL HYDROCHLORIDE 10 MG: 10 TABLET, FILM COATED ORAL at 09:09

## 2019-04-17 RX ADMIN — LIDOCAINE 2 PATCH: 50 PATCH TOPICAL at 09:10

## 2019-04-17 RX ADMIN — METHYLPREDNISOLONE SODIUM SUCCINATE 40 MG: 40 INJECTION, POWDER, FOR SOLUTION INTRAMUSCULAR; INTRAVENOUS at 03:20

## 2019-04-17 RX ADMIN — CARVEDILOL 25 MG: 12.5 TABLET, FILM COATED ORAL at 09:09

## 2019-04-17 RX ADMIN — LEVALBUTEROL HYDROCHLORIDE 1.25 MG: 1.25 SOLUTION, CONCENTRATE RESPIRATORY (INHALATION) at 20:28

## 2019-04-17 RX ADMIN — CARVEDILOL 25 MG: 12.5 TABLET, FILM COATED ORAL at 16:36

## 2019-04-17 RX ADMIN — DOXYCYCLINE HYCLATE 100 MG: 100 CAPSULE ORAL at 18:33

## 2019-04-17 RX ADMIN — LISINOPRIL 40 MG: 20 TABLET ORAL at 09:10

## 2019-04-17 RX ADMIN — METHYLPREDNISOLONE SODIUM SUCCINATE 40 MG: 40 INJECTION, POWDER, FOR SOLUTION INTRAMUSCULAR; INTRAVENOUS at 16:36

## 2019-04-18 VITALS
RESPIRATION RATE: 20 BRPM | HEART RATE: 107 BPM | WEIGHT: 156.09 LBS | DIASTOLIC BLOOD PRESSURE: 84 MMHG | HEIGHT: 67 IN | TEMPERATURE: 97.3 F | BODY MASS INDEX: 24.5 KG/M2 | SYSTOLIC BLOOD PRESSURE: 142 MMHG | OXYGEN SATURATION: 99 %

## 2019-04-18 LAB
PROCALCITONIN SERPL-MCNC: <0.05 NG/ML
TOTAL IGE SMQN RAST: 110 KU/L (ref 0–113)

## 2019-04-18 PROCEDURE — 84145 PROCALCITONIN (PCT): CPT | Performed by: INTERNAL MEDICINE

## 2019-04-18 PROCEDURE — 94760 N-INVAS EAR/PLS OXIMETRY 1: CPT

## 2019-04-18 PROCEDURE — 94640 AIRWAY INHALATION TREATMENT: CPT

## 2019-04-18 PROCEDURE — 99233 SBSQ HOSP IP/OBS HIGH 50: CPT | Performed by: INTERNAL MEDICINE

## 2019-04-18 PROCEDURE — 99238 HOSP IP/OBS DSCHRG MGMT 30/<: CPT | Performed by: FAMILY MEDICINE

## 2019-04-18 RX ORDER — DOXYCYCLINE HYCLATE 100 MG/1
100 CAPSULE ORAL EVERY 12 HOURS SCHEDULED
Qty: 10 CAPSULE | Refills: 0 | Status: SHIPPED | OUTPATIENT
Start: 2019-04-18 | End: 2019-04-18 | Stop reason: HOSPADM

## 2019-04-18 RX ADMIN — CARVEDILOL 25 MG: 12.5 TABLET, FILM COATED ORAL at 08:21

## 2019-04-18 RX ADMIN — VENLAFAXINE HYDROCHLORIDE 150 MG: 37.5 CAPSULE, EXTENDED RELEASE ORAL at 08:20

## 2019-04-18 RX ADMIN — LIDOCAINE 2 PATCH: 50 PATCH TOPICAL at 08:21

## 2019-04-18 RX ADMIN — CALCIUM 2 TABLET: 500 TABLET ORAL at 08:21

## 2019-04-18 RX ADMIN — ENOXAPARIN SODIUM 40 MG: 40 INJECTION SUBCUTANEOUS at 08:21

## 2019-04-18 RX ADMIN — GUAIFENESIN 600 MG: 600 TABLET, EXTENDED RELEASE ORAL at 08:21

## 2019-04-18 RX ADMIN — AMLODIPINE BESYLATE 5 MG: 5 TABLET ORAL at 08:21

## 2019-04-18 RX ADMIN — OXYCODONE HYDROCHLORIDE AND ACETAMINOPHEN 500 MG: 500 TABLET ORAL at 08:21

## 2019-04-18 RX ADMIN — CARVEDILOL 25 MG: 12.5 TABLET, FILM COATED ORAL at 15:51

## 2019-04-18 RX ADMIN — IPRATROPIUM BROMIDE 0.5 MG: 0.5 SOLUTION RESPIRATORY (INHALATION) at 07:14

## 2019-04-18 RX ADMIN — DOXYCYCLINE HYCLATE 100 MG: 100 CAPSULE ORAL at 08:21

## 2019-04-18 RX ADMIN — LEVALBUTEROL HYDROCHLORIDE 1.25 MG: 1.25 SOLUTION, CONCENTRATE RESPIRATORY (INHALATION) at 13:50

## 2019-04-18 RX ADMIN — PRASUGREL HYDROCHLORIDE 10 MG: 10 TABLET, FILM COATED ORAL at 08:21

## 2019-04-18 RX ADMIN — LEVALBUTEROL HYDROCHLORIDE 1.25 MG: 1.25 SOLUTION, CONCENTRATE RESPIRATORY (INHALATION) at 07:14

## 2019-04-18 RX ADMIN — LISINOPRIL 40 MG: 20 TABLET ORAL at 08:21

## 2019-04-18 RX ADMIN — IPRATROPIUM BROMIDE 0.5 MG: 0.5 SOLUTION RESPIRATORY (INHALATION) at 13:50

## 2019-04-18 RX ADMIN — METHYLPREDNISOLONE SODIUM SUCCINATE 40 MG: 40 INJECTION, POWDER, FOR SOLUTION INTRAMUSCULAR; INTRAVENOUS at 02:38

## 2019-04-19 LAB
BACTERIA BLD CULT: NORMAL
BACTERIA BLD CULT: NORMAL

## 2019-05-06 ENCOUNTER — HOSPITAL ENCOUNTER (EMERGENCY)
Facility: HOSPITAL | Age: 74
Discharge: HOME | End: 2019-05-07
Attending: EMERGENCY MEDICINE | Admitting: EMERGENCY MEDICINE
Payer: MEDICARE

## 2019-05-06 DIAGNOSIS — R04.0 EPISTAXIS: Primary | ICD-10-CM

## 2019-05-06 PROCEDURE — 99283 EMERGENCY DEPT VISIT LOW MDM: CPT | Performed by: EMERGENCY MEDICINE

## 2019-05-06 PROCEDURE — 99283 EMERGENCY DEPT VISIT LOW MDM: CPT

## 2019-05-06 RX ORDER — HYDROCODONE BITARTRATE AND ACETAMINOPHEN 5; 325 MG/1; MG/1
1 TABLET ORAL ONCE
Status: COMPLETED | OUTPATIENT
Start: 2019-05-06 | End: 2019-05-07

## 2019-05-06 RX ORDER — OXYMETAZOLINE HYDROCHLORIDE 0.05 G/100ML
2 SPRAY NASAL ONCE
Status: COMPLETED | OUTPATIENT
Start: 2019-05-06 | End: 2019-05-06

## 2019-05-06 RX ADMIN — OXYMETAZOLINE HYDROCHLORIDE 2 SPRAY: 0.05 SPRAY NASAL at 22:56

## 2019-05-07 VITALS
DIASTOLIC BLOOD PRESSURE: 88 MMHG | BODY MASS INDEX: 22.86 KG/M2 | SYSTOLIC BLOOD PRESSURE: 142 MMHG | OXYGEN SATURATION: 100 % | RESPIRATION RATE: 20 BRPM | TEMPERATURE: 98.2 F | WEIGHT: 145.94 LBS | HEART RATE: 100 BPM

## 2019-05-07 RX ADMIN — HYDROCODONE BITARTRATE AND ACETAMINOPHEN 1 TABLET: 5; 325 TABLET ORAL at 00:48

## 2019-12-27 ENCOUNTER — APPOINTMENT (EMERGENCY)
Dept: RADIOLOGY | Facility: HOSPITAL | Age: 74
End: 2019-12-27
Payer: MEDICARE

## 2019-12-27 ENCOUNTER — APPOINTMENT (EMERGENCY)
Dept: CT IMAGING | Facility: HOSPITAL | Age: 74
End: 2019-12-27
Payer: MEDICARE

## 2019-12-27 ENCOUNTER — HOSPITAL ENCOUNTER (EMERGENCY)
Facility: HOSPITAL | Age: 74
End: 2019-12-28
Attending: EMERGENCY MEDICINE | Admitting: EMERGENCY MEDICINE
Payer: MEDICARE

## 2019-12-27 DIAGNOSIS — R63.0 ANOREXIA: Primary | ICD-10-CM

## 2019-12-27 DIAGNOSIS — N39.0 UTI (URINARY TRACT INFECTION): ICD-10-CM

## 2019-12-27 DIAGNOSIS — R45.851 SUICIDAL IDEATION: ICD-10-CM

## 2019-12-27 PROCEDURE — 71046 X-RAY EXAM CHEST 2 VIEWS: CPT

## 2019-12-27 PROCEDURE — 99285 EMERGENCY DEPT VISIT HI MDM: CPT

## 2019-12-27 PROCEDURE — 93005 ELECTROCARDIOGRAM TRACING: CPT

## 2019-12-27 RX ORDER — SODIUM CHLORIDE 9 MG/ML
3 INJECTION INTRAVENOUS AS NEEDED
Status: DISCONTINUED | OUTPATIENT
Start: 2019-12-27 | End: 2019-12-28 | Stop reason: HOSPADM

## 2019-12-27 NOTE — LETTER
[address] [place] [address] 20  [address] [address] [place] [address]  Dept: [phone]      EMTALA TRANSFER CONSENT    NAME Sawyer Delgado                                                                       MRN [id number]    I have been informed of my rights regarding examination, treatment, and transfer   by Dr Billie Thacker, *    Benefits: Specialized equipment and/or services available at the receiving facility (Include comment)________________________(Behavioral health Treatment)    Risks: Potential for delay in receiving treatment      Transfer Request   I acknowledge that my medical condition has been evaluated and explained to me by the emergency department physician or other qualified medical person and/or my attending physician who has recommended and offered to me further medical examination and treatment  I understand the Hospital's obligation with respect to the treatment and stabilization of my emergency medical condition  I nevertheless request to be transferred  I release the Hospital, the doctor, and any other persons caring for me from all responsibility or liability for any injury or ill effects that may result from my transfer and agree to accept all responsibility for the consequences of my choice to transfer, rather than receive stabilizing treatment at the Hospital  I understand that because the transfer is my request, my insurance may not provide reimbursement for the services  The Hospital will assist and direct me and my family in how to make arrangements for transfer, but the hospital is not liable for any fees charged by the transport service  In spite of this understanding, I refuse to consent to further medical examination and treatment which has been offered to me, and request transfer to [address], [address] : [place]   I authorize the performance of emergency medical procedures and treatments upon me in both transit and upon arrival at the receiving facility  Additionally, I authorize the release of any and all medical records to the receiving facility and request they be transported with me, if possible  I authorize the performance of emergency medical procedures and treatments upon me in both transit and upon arrival at the receiving facility  Additionally, I authorize the release of any and all medical records to the receiving facility and request they be transported with me, if possible  I understand that the safest mode of transportation during a medical emergency is an ambulance and that the Hospital advocates the use of this mode of transport  Risks of traveling to the receiving facility by car, including absence of medical control, life sustaining equipment, such as oxygen, and medical personnel has been explained to me and I fully understand them  (ANNIE CORRECT BOX BELOW)  [x ]  I consent to the stated transfer and to be transported by ambulance/helicopter  [  ]  I consent to the stated transfer, but refuse transportation by ambulance and accept full responsibility for my transportation by car  I understand the risks of non-ambulance transfers and I exonerate the Hospital and its staff from any deterioration in my condition that results from this refusal     X___________________________________________    DATE  19  TIME________  Signature of patient or legally responsible individual signing on patient behalf           RELATIONSHIP TO PATIENT_________________________          Provider Certification    NAME Gustavo Sommer                                        [facility] 1945                              MRN [id number]    A medical screening exam was performed on the above named patient  Based on the examination:    Condition Necessitating Transfer The primary encounter diagnosis was Anorexia  A diagnosis of Suicidal ideation was also pertinent to this visit      Patient Condition: The patient has been stabilized such that within reasonable medical probability, no material deterioration of the patient condition or the condition of the unborn child(lyly) is likely to result from the transfer    Reason for Transfer: Level of Care needed not available at this facility    Transfer Requirements: Any [place], [place]   · Space available and qualified personnel available for treatment as acknowledged by Dari Jeong, [phone]  · Agreed to accept transfer and to provide appropriate medical treatment as acknowledged by       Dr Yeimi Haas MD  · Appropriate medical records of the examination and treatment of the patient are provided at the time of transfer   [address] [facility],[address] _______  · Transfer will be performed by qualified personnel from    and appropriate transfer equipment as required, including the use of necessary and appropriate life support measures  Provider Certification: I have examined the patient and explained the following risks and benefits of being transferred/refusing transfer to the patient/family:  General risk, such as traffic hazards, adverse weather conditions, rough terrain or turbulence, possible failure of equipment (including vehicle or aircraft), or consequences of actions of persons outside the control of the transport personnel      Based on these reasonable risks and benefits to the patient and/or the unborn child(lyly), and based upon the information available at the time of the patients examination, I certify that the medical benefits reasonably to be expected from the provision of appropriate medical treatments at another medical facility outweigh the increasing risks, if any, to the individuals medical condition, and in the case of labor to the unborn child, from effecting the transfer      X____________________________________________ DATE 12/28/19        TIME_______      ORIGINAL - SEND TO MEDICAL RECORDS   COPY - SEND WITH PATIENT DURING TRANSFER

## 2019-12-28 ENCOUNTER — HOSPITAL ENCOUNTER (INPATIENT)
Facility: HOSPITAL | Age: 74
LOS: 11 days | Discharge: HOME HEALTH | DRG: 885 | End: 2020-01-08
Attending: PSYCHIATRY & NEUROLOGY | Admitting: PSYCHIATRY & NEUROLOGY
Payer: MEDICARE

## 2019-12-28 ENCOUNTER — APPOINTMENT (EMERGENCY)
Dept: CT IMAGING | Facility: HOSPITAL | Age: 74
End: 2019-12-28
Payer: MEDICARE

## 2019-12-28 VITALS
DIASTOLIC BLOOD PRESSURE: 76 MMHG | TEMPERATURE: 97.7 F | HEART RATE: 79 BPM | OXYGEN SATURATION: 100 % | BODY MASS INDEX: 19.62 KG/M2 | SYSTOLIC BLOOD PRESSURE: 141 MMHG | HEIGHT: 67 IN | RESPIRATION RATE: 17 BRPM | WEIGHT: 125 LBS

## 2019-12-28 DIAGNOSIS — R26.2 AMBULATORY DYSFUNCTION: ICD-10-CM

## 2019-12-28 DIAGNOSIS — E55.9 VITAMIN D DEFICIENCY: ICD-10-CM

## 2019-12-28 DIAGNOSIS — M54.50 ACUTE MIDLINE LOW BACK PAIN WITHOUT SCIATICA: ICD-10-CM

## 2019-12-28 DIAGNOSIS — F33.1 MAJOR DEPRESSIVE DISORDER, RECURRENT EPISODE, MODERATE (HCC): Primary | ICD-10-CM

## 2019-12-28 DIAGNOSIS — J44.1 COPD EXACERBATION (HCC): ICD-10-CM

## 2019-12-28 DIAGNOSIS — R63.0 ANOREXIA: ICD-10-CM

## 2019-12-28 DIAGNOSIS — I10 ESSENTIAL HYPERTENSION: ICD-10-CM

## 2019-12-28 LAB
ALBUMIN SERPL BCP-MCNC: 3.6 G/DL (ref 3.5–5)
ALP SERPL-CCNC: 95 U/L (ref 46–116)
ALT SERPL W P-5'-P-CCNC: 9 U/L (ref 12–78)
AMPHETAMINES SERPL QL SCN: NEGATIVE
ANION GAP SERPL CALCULATED.3IONS-SCNC: 6 MMOL/L (ref 4–13)
AST SERPL W P-5'-P-CCNC: 10 U/L (ref 5–45)
ATRIAL RATE: 75 BPM
BACTERIA UR QL AUTO: ABNORMAL /HPF
BARBITURATES UR QL: NEGATIVE
BASOPHILS # BLD MANUAL: 0 THOUSAND/UL (ref 0–0.1)
BASOPHILS NFR MAR MANUAL: 0 % (ref 0–1)
BENZODIAZ UR QL: NEGATIVE
BILIRUB SERPL-MCNC: 0.3 MG/DL (ref 0.2–1)
BILIRUB UR QL STRIP: NEGATIVE
BUN SERPL-MCNC: 24 MG/DL (ref 5–25)
CALCIUM SERPL-MCNC: 9.5 MG/DL (ref 8.3–10.1)
CHLORIDE SERPL-SCNC: 102 MMOL/L (ref 100–108)
CLARITY UR: CLEAR
CO2 SERPL-SCNC: 32 MMOL/L (ref 21–32)
COCAINE UR QL: NEGATIVE
COLOR UR: YELLOW
CREAT SERPL-MCNC: 0.88 MG/DL (ref 0.6–1.3)
EOSINOPHIL # BLD MANUAL: 0.22 THOUSAND/UL (ref 0–0.4)
EOSINOPHIL NFR BLD MANUAL: 2 % (ref 0–6)
ERYTHROCYTE [DISTWIDTH] IN BLOOD BY AUTOMATED COUNT: 13.7 % (ref 11.6–15.1)
ETHANOL SERPL-MCNC: <3 MG/DL (ref 0–3)
GFR SERPL CREATININE-BSD FRML MDRD: 65 ML/MIN/1.73SQ M
GLUCOSE SERPL-MCNC: 88 MG/DL (ref 65–140)
GLUCOSE UR STRIP-MCNC: NEGATIVE MG/DL
HCT VFR BLD AUTO: 33.5 % (ref 34.8–46.1)
HGB BLD-MCNC: 10.6 G/DL (ref 11.5–15.4)
HGB UR QL STRIP.AUTO: NEGATIVE
KETONES UR STRIP-MCNC: NEGATIVE MG/DL
LEUKOCYTE ESTERASE UR QL STRIP: ABNORMAL
LYMPHOCYTES # BLD AUTO: 69 % (ref 14–44)
LYMPHOCYTES # BLD AUTO: 7.56 THOUSAND/UL (ref 0.6–4.47)
MAGNESIUM SERPL-MCNC: 1.9 MG/DL (ref 1.6–2.6)
MCH RBC QN AUTO: 30.1 PG (ref 26.8–34.3)
MCHC RBC AUTO-ENTMCNC: 31.6 G/DL (ref 31.4–37.4)
MCV RBC AUTO: 95 FL (ref 82–98)
METHADONE UR QL: NEGATIVE
MONOCYTES # BLD AUTO: 0.66 THOUSAND/UL (ref 0–1.22)
MONOCYTES NFR BLD: 6 % (ref 4–12)
NEUTROPHILS # BLD MANUAL: 2.08 THOUSAND/UL (ref 1.85–7.62)
NEUTS SEG NFR BLD AUTO: 19 % (ref 43–75)
NITRITE UR QL STRIP: POSITIVE
NON-SQ EPI CELLS URNS QL MICRO: ABNORMAL /HPF
NRBC BLD AUTO-RTO: 0 /100 WBCS
OPIATES UR QL SCN: POSITIVE
P AXIS: 75 DEGREES
PCP UR QL: NEGATIVE
PH UR STRIP.AUTO: 5.5 [PH]
PHOSPHATE SERPL-MCNC: 3.3 MG/DL (ref 2.3–4.1)
PLATELET # BLD AUTO: 223 THOUSANDS/UL (ref 149–390)
PLATELET BLD QL SMEAR: ADEQUATE
PMV BLD AUTO: 8.8 FL (ref 8.9–12.7)
POTASSIUM SERPL-SCNC: 3.5 MMOL/L (ref 3.5–5.3)
PR INTERVAL: 146 MS
PROT SERPL-MCNC: 7.8 G/DL (ref 6.4–8.2)
PROT UR STRIP-MCNC: NEGATIVE MG/DL
QRS AXIS: 87 DEGREES
QRSD INTERVAL: 86 MS
QT INTERVAL: 402 MS
QTC INTERVAL: 448 MS
RBC # BLD AUTO: 3.52 MILLION/UL (ref 3.81–5.12)
RBC #/AREA URNS AUTO: ABNORMAL /HPF
SODIUM SERPL-SCNC: 140 MMOL/L (ref 136–145)
SP GR UR STRIP.AUTO: 1.01 (ref 1–1.03)
T WAVE AXIS: 80 DEGREES
THC UR QL: NEGATIVE
TOTAL CELLS COUNTED SPEC: 100
TROPONIN I SERPL-MCNC: <0.02 NG/ML
TSH SERPL DL<=0.05 MIU/L-ACNC: 0.84 UIU/ML (ref 0.36–3.74)
UROBILINOGEN UR QL STRIP.AUTO: 0.2 E.U./DL
VARIANT LYMPHS # BLD AUTO: 4 %
VENTRICULAR RATE: 75 BPM
WBC # BLD AUTO: 10.95 THOUSAND/UL (ref 4.31–10.16)
WBC #/AREA URNS AUTO: ABNORMAL /HPF
WBC CLUMPS # UR AUTO: PRESENT /UL

## 2019-12-28 PROCEDURE — 94760 N-INVAS EAR/PLS OXIMETRY 1: CPT

## 2019-12-28 PROCEDURE — 85007 BL SMEAR W/DIFF WBC COUNT: CPT | Performed by: EMERGENCY MEDICINE

## 2019-12-28 PROCEDURE — 80320 DRUG SCREEN QUANTALCOHOLS: CPT | Performed by: EMERGENCY MEDICINE

## 2019-12-28 PROCEDURE — 99285 EMERGENCY DEPT VISIT HI MDM: CPT | Performed by: EMERGENCY MEDICINE

## 2019-12-28 PROCEDURE — 80307 DRUG TEST PRSMV CHEM ANLYZR: CPT | Performed by: EMERGENCY MEDICINE

## 2019-12-28 PROCEDURE — 84484 ASSAY OF TROPONIN QUANT: CPT | Performed by: EMERGENCY MEDICINE

## 2019-12-28 PROCEDURE — 83735 ASSAY OF MAGNESIUM: CPT | Performed by: EMERGENCY MEDICINE

## 2019-12-28 PROCEDURE — 87186 SC STD MICRODIL/AGAR DIL: CPT | Performed by: EMERGENCY MEDICINE

## 2019-12-28 PROCEDURE — 93010 ELECTROCARDIOGRAM REPORT: CPT | Performed by: INTERNAL MEDICINE

## 2019-12-28 PROCEDURE — 94640 AIRWAY INHALATION TREATMENT: CPT

## 2019-12-28 PROCEDURE — 84443 ASSAY THYROID STIM HORMONE: CPT | Performed by: EMERGENCY MEDICINE

## 2019-12-28 PROCEDURE — 81001 URINALYSIS AUTO W/SCOPE: CPT | Performed by: EMERGENCY MEDICINE

## 2019-12-28 PROCEDURE — 85027 COMPLETE CBC AUTOMATED: CPT | Performed by: EMERGENCY MEDICINE

## 2019-12-28 PROCEDURE — 70450 CT HEAD/BRAIN W/O DYE: CPT

## 2019-12-28 PROCEDURE — 84100 ASSAY OF PHOSPHORUS: CPT | Performed by: EMERGENCY MEDICINE

## 2019-12-28 PROCEDURE — 87077 CULTURE AEROBIC IDENTIFY: CPT | Performed by: EMERGENCY MEDICINE

## 2019-12-28 PROCEDURE — 80053 COMPREHEN METABOLIC PANEL: CPT | Performed by: EMERGENCY MEDICINE

## 2019-12-28 PROCEDURE — 87086 URINE CULTURE/COLONY COUNT: CPT | Performed by: EMERGENCY MEDICINE

## 2019-12-28 PROCEDURE — 36415 COLL VENOUS BLD VENIPUNCTURE: CPT | Performed by: EMERGENCY MEDICINE

## 2019-12-28 RX ORDER — CALCIUM CARBONATE 500(1250)
2 TABLET ORAL
Status: DISCONTINUED | OUTPATIENT
Start: 2019-12-29 | End: 2020-01-08 | Stop reason: HOSPADM

## 2019-12-28 RX ORDER — HYDROCHLOROTHIAZIDE 25 MG/1
25 TABLET ORAL DAILY
Status: ON HOLD | COMMUNITY
End: 2020-01-07 | Stop reason: SDUPTHER

## 2019-12-28 RX ORDER — BUDESONIDE AND FORMOTEROL FUMARATE DIHYDRATE 160; 4.5 UG/1; UG/1
2 AEROSOL RESPIRATORY (INHALATION) 2 TIMES DAILY
Status: DISCONTINUED | OUTPATIENT
Start: 2019-12-28 | End: 2020-01-08 | Stop reason: HOSPADM

## 2019-12-28 RX ORDER — ACETAMINOPHEN 325 MG/1
975 TABLET ORAL EVERY 6 HOURS PRN
Status: CANCELLED | OUTPATIENT
Start: 2019-12-28

## 2019-12-28 RX ORDER — CEPHALEXIN 250 MG/1
500 CAPSULE ORAL ONCE
Status: COMPLETED | OUTPATIENT
Start: 2019-12-28 | End: 2019-12-28

## 2019-12-28 RX ORDER — ACETAMINOPHEN 325 MG/1
975 TABLET ORAL EVERY 6 HOURS PRN
Status: DISCONTINUED | OUTPATIENT
Start: 2019-12-28 | End: 2020-01-02

## 2019-12-28 RX ORDER — HYDROXYZINE HYDROCHLORIDE 25 MG/1
25 TABLET, FILM COATED ORAL EVERY 6 HOURS PRN
Status: CANCELLED | OUTPATIENT
Start: 2019-12-28

## 2019-12-28 RX ORDER — ACETAMINOPHEN 325 MG/1
650 TABLET ORAL EVERY 6 HOURS PRN
Status: CANCELLED | OUTPATIENT
Start: 2019-12-28

## 2019-12-28 RX ORDER — TRAZODONE HYDROCHLORIDE 50 MG/1
50 TABLET ORAL
Status: CANCELLED | OUTPATIENT
Start: 2019-12-28

## 2019-12-28 RX ORDER — LEVALBUTEROL 1.25 MG/.5ML
1.25 SOLUTION, CONCENTRATE RESPIRATORY (INHALATION)
Status: DISCONTINUED | OUTPATIENT
Start: 2019-12-28 | End: 2019-12-28

## 2019-12-28 RX ORDER — HALOPERIDOL 2 MG/1
2 TABLET ORAL EVERY 8 HOURS PRN
Status: DISCONTINUED | OUTPATIENT
Start: 2019-12-28 | End: 2020-01-08 | Stop reason: HOSPADM

## 2019-12-28 RX ORDER — MAGNESIUM HYDROXIDE/ALUMINUM HYDROXICE/SIMETHICONE 120; 1200; 1200 MG/30ML; MG/30ML; MG/30ML
30 SUSPENSION ORAL EVERY 4 HOURS PRN
Status: DISCONTINUED | OUTPATIENT
Start: 2019-12-28 | End: 2020-01-08 | Stop reason: HOSPADM

## 2019-12-28 RX ORDER — HYDROXYZINE HYDROCHLORIDE 25 MG/1
25 TABLET, FILM COATED ORAL EVERY 6 HOURS PRN
Status: DISCONTINUED | OUTPATIENT
Start: 2019-12-28 | End: 2019-12-31

## 2019-12-28 RX ORDER — MAGNESIUM HYDROXIDE/ALUMINUM HYDROXICE/SIMETHICONE 120; 1200; 1200 MG/30ML; MG/30ML; MG/30ML
30 SUSPENSION ORAL EVERY 4 HOURS PRN
Status: CANCELLED | OUTPATIENT
Start: 2019-12-28

## 2019-12-28 RX ORDER — LEVALBUTEROL 1.25 MG/.5ML
1.25 SOLUTION, CONCENTRATE RESPIRATORY (INHALATION)
Status: DISCONTINUED | OUTPATIENT
Start: 2019-12-28 | End: 2020-01-04

## 2019-12-28 RX ORDER — ACETAMINOPHEN 325 MG/1
650 TABLET ORAL EVERY 6 HOURS PRN
Status: DISCONTINUED | OUTPATIENT
Start: 2019-12-28 | End: 2020-01-08 | Stop reason: HOSPADM

## 2019-12-28 RX ORDER — HALOPERIDOL 5 MG/ML
2 INJECTION INTRAMUSCULAR EVERY 8 HOURS PRN
Status: DISCONTINUED | OUTPATIENT
Start: 2019-12-28 | End: 2020-01-08 | Stop reason: HOSPADM

## 2019-12-28 RX ORDER — TRAZODONE HYDROCHLORIDE 50 MG/1
50 TABLET ORAL
Status: DISCONTINUED | OUTPATIENT
Start: 2019-12-28 | End: 2020-01-03

## 2019-12-28 RX ORDER — ACETAMINOPHEN 325 MG/1
650 TABLET ORAL EVERY 4 HOURS PRN
Status: CANCELLED | OUTPATIENT
Start: 2019-12-28

## 2019-12-28 RX ORDER — ACETAMINOPHEN 325 MG/1
650 TABLET ORAL EVERY 4 HOURS PRN
Status: DISCONTINUED | OUTPATIENT
Start: 2019-12-28 | End: 2020-01-08 | Stop reason: HOSPADM

## 2019-12-28 RX ORDER — SODIUM CHLORIDE FOR INHALATION 0.9 %
3 VIAL, NEBULIZER (ML) INHALATION
Status: DISCONTINUED | OUTPATIENT
Start: 2019-12-28 | End: 2020-01-04

## 2019-12-28 RX ORDER — PREDNISONE 10 MG/1
20 TABLET ORAL DAILY
Status: DISCONTINUED | OUTPATIENT
Start: 2019-12-29 | End: 2020-01-08 | Stop reason: HOSPADM

## 2019-12-28 RX ORDER — ALBUTEROL SULFATE 90 UG/1
2 AEROSOL, METERED RESPIRATORY (INHALATION) EVERY 6 HOURS PRN
Status: DISCONTINUED | OUTPATIENT
Start: 2019-12-28 | End: 2020-01-06

## 2019-12-28 RX ORDER — HALOPERIDOL 1 MG/1
2 TABLET ORAL EVERY 8 HOURS PRN
Status: CANCELLED | OUTPATIENT
Start: 2019-12-28

## 2019-12-28 RX ORDER — HALOPERIDOL 5 MG/ML
2 INJECTION INTRAMUSCULAR EVERY 8 HOURS PRN
Status: CANCELLED | OUTPATIENT
Start: 2019-12-28

## 2019-12-28 RX ORDER — AMLODIPINE BESYLATE 5 MG/1
5 TABLET ORAL DAILY
Status: DISCONTINUED | OUTPATIENT
Start: 2019-12-29 | End: 2020-01-08 | Stop reason: HOSPADM

## 2019-12-28 RX ORDER — CARVEDILOL 25 MG/1
25 TABLET ORAL 2 TIMES DAILY WITH MEALS
Status: DISCONTINUED | OUTPATIENT
Start: 2019-12-29 | End: 2020-01-08 | Stop reason: HOSPADM

## 2019-12-28 RX ORDER — LISINOPRIL 20 MG/1
20 TABLET ORAL DAILY
Status: DISCONTINUED | OUTPATIENT
Start: 2019-12-29 | End: 2019-12-31

## 2019-12-28 RX ORDER — HYDROCHLOROTHIAZIDE 12.5 MG/1
25 TABLET ORAL DAILY
Status: DISCONTINUED | OUTPATIENT
Start: 2019-12-29 | End: 2020-01-08 | Stop reason: HOSPADM

## 2019-12-28 RX ADMIN — ACETAMINOPHEN 975 MG: 325 TABLET ORAL at 20:35

## 2019-12-28 RX ADMIN — LEVALBUTEROL HYDROCHLORIDE 1.25 MG: 1.25 SOLUTION, CONCENTRATE RESPIRATORY (INHALATION) at 21:15

## 2019-12-28 RX ADMIN — TRAZODONE HYDROCHLORIDE 50 MG: 50 TABLET ORAL at 20:36

## 2019-12-28 RX ADMIN — CEPHALEXIN 500 MG: 250 CAPSULE ORAL at 04:52

## 2019-12-28 RX ADMIN — ISODIUM CHLORIDE 3 ML: 0.03 SOLUTION RESPIRATORY (INHALATION) at 21:16

## 2019-12-28 NOTE — PROGRESS NOTES
Patient admitted to unit via litter escorted by ambulance personnel under a 201  Patient presented to [facility] ED with family with SI to OD on her medication, poor oral intake, increasing depression over the past 2-3 months  Patient is alert and oriented x 4, verbalizes needs  Patient endorses anxiety/depression of 8/10, denies wanting to harm self while in the hospital as she feels safe, denies HI, hallucinations  Patient triggers for increasing depression include isolation at home, medical ailments, chronic pain, lack of support, aging process  Patient was noted to be on close observation in ED due to [place] [address]  Upon unit assessment patient did admit to SI but stated she would not act on it, will not harm self in hospital as she feels safe  Patient suicide risk assessment reviewed with Holly Osborn, may place patient on 7" checks for safety and behaviors

## 2019-12-28 NOTE — PROGRESS NOTES
Name: Shana Guillen    : 47  Date: 19     Time: 6 AM  Location of patient: [facility] emergency room, to be admitted to the [address] [place] [address] older adult psychiatric unit      [facility]                                                   Phone Note     I received a phone consult to place admission orders on this patient, she came to the emergency room, with anxiety, depression, not eating, suicidal thoughts to overdose on pills  She was found to have a UTI, she was started on Keflex in the emergency room  I briefly reviewed the chart and placed admission orders        Alphonso Montemayor MD
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
## 2019-12-28 NOTE — ED PROVIDER NOTES
History  Chief Complaint   Patient presents with    Anorexia     not eating for 3 weaks, not drinking, chills, hotflashes, weakness    Suicidal     depression, suicidal thoughts     81-year-old female presents to the emergency room for decreased appetite, depression, and suicidal thoughts  Patient states that she has a long history of chronic back pain  States that she often has difficulty ambulating because of it  She states that she has been lying in bed for the last couple of weeks and has had decreased p o  Intake  She states that she was also nauseous and stopped her antidepressive meds 3 weeks ago  She states that over the last few weeks she has been crying more frequently and has had increased depression  She reports that she has also had suicidal thoughts with plan to overdose on her medications  States that she does not have any history of suicide attempts in the past   She denies any prior psychiatric admissions  She denies any URI symptoms, fever, chest pain, shortness of breath, abdominal pain, urinary symptoms, or diarrhea/constipation  No other complaints  History provided by:  Patient      Prior to Admission Medications   Prescriptions Last Dose Informant Patient Reported? Taking? Calcium-Magnesium-Vitamin D (CALCIUM 500 PO)   Yes No   Sig: Take 1,000 mg by mouth daily   Potassium 99 MG TABS   Yes No   Sig: Take 99 mg by mouth   albuterol (VENTOLIN HFA) 90 mcg/act inhaler   Yes No   Sig: Inhale 2 puffs every 6 (six) hours as needed for wheezing   alendronate (FOSAMAX) 35 mg tablet   Yes No   Sig: Take 35 mg by mouth every 7 days Patient takes this med every Sunday  amLODIPine (NORVASC) 5 mg tablet   Yes No   Sig: Take 5 mg by mouth daily   ascorbic acid (VITAMIN C) 500 mg tablet   Yes No   Sig: Take 500 mg by mouth daily   budesonide-formoterol (SYMBICORT) 160-4 5 mcg/act inhaler   Yes No   Sig: Inhale 2 puffs 2 (two) times a day Rinse mouth after use     carvedilol (COREG) 25 mg tablet   Yes No   Sig: Take 25 mg by mouth 2 (two) times a day with meals   enalapril (VASOTEC) 20 mg tablet   Yes No   Sig: Take 20 mg by mouth 2 (two) times a day   guaiFENesin (MUCINEX) 600 mg 12 hr tablet   No No   Sig: Take 1 tablet (600 mg total) by mouth 2 (two) times a day   levalbuterol (XOPENEX) 1 25 mg/0 5 mL nebulizer solution   No No   Sig: Take 0 5 mL (1 25 mg total) by nebulization 3 (three) times a day   lidocaine (LIDODERM) 5 %   No No   Sig: Apply 1 patch topically daily Remove & Discard patch within 12 hours or as directed by MD   methocarbamol (ROBAXIN) 500 mg tablet   No No   Sig: Take 1 tablet (500 mg total) by mouth every 6 (six) hours as needed for muscle spasms   nystatin (MYCOSTATIN) 100,000 units/mL suspension   No No   Sig: Swish and swallow 5 mL (500,000 Units total) 4 (four) times a day   Patient not taking: Reported on 4/12/2019   prasugrel (EFFIENT) tablet   Yes No   Sig: Take 10 mg by mouth   predniSONE 10 mg tablet   No No   Sig: Take 2 tablets (20 mg total) by mouth daily For next 3 days, then decrease to 10 mg for 3 days then discontinue   sodium chloride 0 9 % nebulizer solution   No No   Sig: Take 3 mL by nebulization 3 (three) times a day   venlafaxine (EFFEXOR-XR) 150 mg 24 hr capsule   Yes No   Sig: Take 150 mg by mouth daily      Facility-Administered Medications: None       Past Medical History:   Diagnosis Date    Anxiety     Cardiac disease     Chronic pain disorder     COPD (chronic obstructive pulmonary disease) (HCC)     Depression     Heart disease     Hyperlipidemia     Hypertension     [facility] infarction[facility] ([facility] [address] )     Renal disorder     benign kidney tumor       Past Surgical History:   Procedure Laterality Date    APPENDECTOMY      ELBOW BURSA SURGERY Left 02/2018    D/T MRSA INFECTION    SPINAL FUSION      L7 L9       Family History   Problem Relation Age of Onset    Cancer Father      I have reviewed and agree with the history as documented  Social History     Tobacco Use    Smoking status: Former Smoker     Packs/day: 0 00     Years: 0 00     Pack years: 0 00     Types: Cigarettes     Last attempt to quit: 2018     Years since quittin 1    Smokeless tobacco: Never Used   Substance Use Topics    Alcohol use: Never     Frequency: Never    Drug use: No        Review of Systems   Constitutional: Positive for activity change, appetite change and fatigue  Negative for chills and fever  HENT: Negative for congestion, ear pain and sore throat  Eyes: Negative for pain and visual disturbance  Respiratory: Negative for cough, shortness of breath and wheezing  Cardiovascular: Negative for chest pain and leg swelling  Gastrointestinal: Positive for nausea  Negative for abdominal pain, diarrhea and vomiting  Genitourinary: Negative for dysuria, frequency, hematuria and urgency  Musculoskeletal: Negative for neck pain and neck stiffness  Skin: Negative for rash and wound  Neurological: Negative for weakness, numbness and headaches  Psychiatric/Behavioral: Positive for suicidal ideas  Negative for agitation and confusion  All other systems reviewed and are negative  Physical Exam  Physical Exam   Constitutional: She is oriented to person, place, and time  She appears well-developed and well-nourished  HENT:   Head: Normocephalic and atraumatic  Mouth/Throat: Mucous membranes are dry  Eyes: Pupils are equal, round, and reactive to light  EOM are normal    Neck: Normal range of motion  Neck supple  Cardiovascular: Normal rate and regular rhythm  Pulmonary/Chest: Effort normal and breath sounds normal  No stridor  No respiratory distress  She has no wheezes  She has no rales  Abdominal: Soft  Bowel sounds are normal  She exhibits no distension  There is no tenderness  Musculoskeletal: Normal range of motion  Neurological: She is alert and oriented to person, place, and time     No focal deficits Skin: Skin is warm and dry  Psychiatric: Her speech is normal  She is withdrawn  Cognition and memory are normal  She expresses inappropriate judgment  She exhibits a depressed mood  She expresses suicidal ideation  She expresses suicidal plans  Nursing note and vitals reviewed  Vital Signs  ED Triage Vitals   Temperature Pulse Respirations Blood Pressure SpO2   12/27/19 2151 12/27/19 2151 12/27/19 2151 12/27/19 2154 12/27/19 2151   97 7 °F (36 5 °C) 91 22 128/68 99 %      Temp Source Heart Rate Source Patient Position - Orthostatic VS BP Location FiO2 (%)   12/27/19 2151 12/27/19 2305 12/28/19 0148 12/28/19 0148 --   Oral Monitor Lying Left arm       Pain Score       12/27/19 2155       2           Vitals:    12/28/19 0530 12/28/19 0600 12/28/19 0630 12/28/19 0945   BP: 136/71 146/76 151/77 141/76   Pulse: 77 84 81 79   Patient Position - Orthostatic VS: Lying Lying Lying Lying         Visual Acuity      ED Medications  Medications   cephalexin (KEFLEX) capsule 500 mg (500 mg Oral Given 12/28/19 0452)       Diagnostic Studies  Results Reviewed     Procedure Component Value Units Date/Time    Urine Microscopic [[id number]]  (Abnormal) Collected:  12/28/19 0256    Lab Status:  Final result Specimen:  Urine, Clean Catch Updated:  12/28/19 0329     RBC, UA None Seen /hpf      WBC, UA 20-30 /hpf      Epithelial Cells Occasional /hpf      Bacteria, UA Moderate /hpf      WBC Clumps PRESENT    Urine culture [[id number]] Collected:  12/28/19 0256    Lab Status:   In process Specimen:  Urine, Clean Catch Updated:  12/28/19 0328    Rapid drug screen, urine [[id number]]  (Abnormal) Collected:  12/28/19 0256    Lab Status:  Final result Specimen:  Urine, Clean Catch Updated:  12/28/19 0320     Amph/Meth UR Negative     Barbiturate Ur Negative     Benzodiazepine Urine Negative     Cocaine Urine Negative     Methadone Urine Negative     Opiate Urine Positive     PCP Ur Negative     THC Urine Negative    Narrative: Presumptive report  If requested, specimen will be sent to reference lab for confirmation  FOR MEDICAL PURPOSES ONLY  IF CONFIRMATION NEEDED PLEASE CONTACT THE LAB WITHIN 5 DAYS      Drug Screen Cutoff Levels:  AMPHETAMINE/METHAMPHETAMINES  1000 ng/mL  BARBITURATES     200 ng/mL  BENZODIAZEPINES     200 ng/mL  COCAINE      300 ng/mL  METHADONE      300 ng/mL  OPIATES      300 ng/mL  PHENCYCLIDINE     25 ng/mL  THC       50 ng/mL      UA w Reflex to Microscopic w Reflex to Culture [[id number]]  (Abnormal) Collected:  12/28/19 0256    Lab Status:  Final result Specimen:  Urine, Clean Catch Updated:  12/28/19 0305     Color, UA Yellow     Clarity, UA Clear     Specific Gravity, UA 1 015     pH, UA 5 5     Leukocytes, UA Large     Nitrite, UA Positive     Protein, UA Negative mg/dl      Glucose, UA Negative mg/dl      Ketones, UA Negative mg/dl      Urobilinogen, UA 0 2 E U /dl      Bilirubin, UA Negative     Blood, UA Negative    CBC and differential [[id number]]  (Abnormal) Collected:  12/28/19 0006    Lab Status:  Final result Specimen:  Blood from Arm, Right Updated:  12/28/19 0109     WBC 10 95 Thousand/uL      RBC 3 52 Million/uL      Hemoglobin 10 6 g/dL      Hematocrit 33 5 %      MCV 95 fL      MCH 30 1 pg      MCHC 31 6 g/dL      RDW 13 7 %      MPV 8 8 fL      Platelets 744 Thousands/uL      nRBC 0 /100 WBCs     Ethanol [[id number]]  (Normal) Collected:  12/28/19 0006    Lab Status:  Final result Specimen:  Blood from Arm, Right Updated:  12/28/19 0053     Ethanol Lvl <3 mg/dL     Magnesium [[id number]]  (Normal) Collected:  12/28/19 0006    Lab Status:  Final result Specimen:  Blood from Arm, Right Updated:  12/28/19 0039     Magnesium 1 9 mg/dL     Phosphorus [[id number]]  (Normal) Collected:  12/28/19 0006    Lab Status:  Final result Specimen:  Blood from Arm, Right Updated:  12/28/19 0039     Phosphorus 3 3 mg/dL     TSH, 3rd generation with Free T4 reflex [[id number]]  (Normal) Collected:  12/28/19 0006 Lab Status:  Final result Specimen:  Blood from Arm, Right Updated:  12/28/19 0039     TSH 3RD GENERATON 0 841 uIU/mL     Narrative:       Patients undergoing fluorescein dye angiography may retain small amounts of fluorescein in the body for 48-72 hours post procedure  Samples containing fluorescein can produce falsely depressed TSH values  If the patient had this procedure,a specimen should be resubmitted post fluorescein clearance        Troponin I [[id number]]  (Normal) Collected:  12/28/19 0006    Lab Status:  Final result Specimen:  Blood from Arm, Right Updated:  12/28/19 0033     Troponin I <0 02 ng/mL     Comprehensive metabolic panel [[id number]]  (Abnormal) Collected:  12/28/19 0006    Lab Status:  Final result Specimen:  Blood from Arm, Right Updated:  12/28/19 0028     Sodium 140 mmol/L      Potassium 3 5 mmol/L      Chloride 102 mmol/L      CO2 32 mmol/L      ANION GAP 6 mmol/L      BUN 24 mg/dL      Creatinine 0 88 mg/dL      Glucose 88 mg/dL      Calcium 9 5 mg/dL      AST 10 U/L      ALT 9 U/L      Alkaline Phosphatase 95 U/L      Total Protein 7 8 g/dL      Albumin 3 6 g/dL      Total Bilirubin 0 30 mg/dL      eGFR 65 ml/min/1 73sq m     Narrative:       [place] guidelines for Chronic Kidney Disease (CKD):     Stage 1 with normal or high GFR (GFR > 90 mL/min/1 73 square meters)    Stage 2 Mild CKD (GFR = 60-89 mL/min/1 73 square meters)    Stage 3A Moderate CKD (GFR = 45-59 mL/min/1 73 square meters)    Stage 3B Moderate CKD (GFR = 30-44 mL/min/1 73 square meters)    Stage 4 Severe CKD (GFR = 15-29 mL/min/1 73 square meters)    Stage 5 End Stage CKD (GFR <15 mL/min/1 73 square meters)  Note: GFR calculation is accurate only with a steady state creatinine                 CT head without contrast   Final Result by Margaret Riley DO (12/28 0134)      Fluid opacifies multiple mastoid air cells on the left, nonspecific but consider an otomastoiditis in the appropriate clinical setting  No acute intracranial process is seen otherwise  Other findings as above  Workstation performed: [id number]         X-ray chest 2 views   ED Interpretation by Estefany Vizcarra DO (12/28 0143)   NAP       Final Result by Addy Manzano MD (12/28 5906)      Hyperinflated lungs without focal consolidation  Workstation performed: [id number]                    Procedures  ECG 12 Lead Documentation Only  Date/Time: 12/27/2019 11:58 PM  Performed by: Estefany Vizcarra DO  Authorized by: Estefany Vizcarra DO     Indications / Diagnosis:  Depression   Patient location:  ED  Previous ECG:     Previous ECG:  Compared to current    Comparison ECG info:  4/12/19  Rate:     ECG rate:  75    ECG rate assessment: normal    Rhythm:     Rhythm: sinus rhythm    Ectopy:     Ectopy: none    QRS:     QRS axis:  Normal    QRS intervals:  Normal  ST segments:     ST segments:  Normal  T waves:     T waves: normal               ED Course  ED Course as of Dec 28 1730   Fri Dec 27, 2019   2230 Decreased appetitie and drinking x 3 weeks  Nausea  Feeling depressed, crying  Has chronic back pain-       Sat Dec 28, 2019   0138 Patient does not clinically have any complaints of sinus pressure/ ear pain/ or cold like symptoms    CT head without contrast   0301 Case signed out to Dr Deluna Failing pending placement and UA results                                   MDM  Number of Diagnoses or Management Options  Anorexia: new and requires workup  Suicidal ideation: new and requires workup  UTI (urinary tract infection): new and requires workup  Diagnosis management comments: Patient with depression, decreased appetite, and suicidal ideation  Will do labs, UA, and CT head to rule out intracranial abnormality  If patient is medically cleared, will have crisis evaluate patient and she will require admission for suicidal ideation with plan      The patient medically cleared for psychiatric admission  Case signed out to Dr Darrick Newsome pending placement and UA results        Amount and/or Complexity of Data Reviewed  Clinical lab tests: ordered and reviewed  Tests in the radiology section of CPT®: ordered and reviewed  Tests in the medicine section of CPT®: ordered and reviewed  Discussion of test results with the performing providers: yes  Decide to obtain previous medical records or to obtain history from someone other than the patient: yes  Obtain history from someone other than the patient: yes  Review and summarize past medical records: yes  Discuss the patient with other providers: yes  Independent visualization of images, tracings, or specimens: yes    Patient Progress  Patient progress: improved        Disposition  Final diagnoses:   Anorexia   Suicidal ideation   UTI (urinary tract infection)     Time reflects when diagnosis was documented in both MDM as applicable and the Disposition within this note     Time User Action Codes Description Comment    12/28/2019  3:02 AM Scar Segura Add [R63 0] Anorexia     12/28/2019  3:02 AM Aquilino Segura Add [R45 851] Suicidal ideation     12/28/2019  5:30 PM Sam Jamison Add [N39 0] UTI (urinary tract infection)       ED Disposition     ED Disposition Condition Date/Time Comment    Transfer to Another Facility  Sat Dec 28, 2019  2:37 PM [facility]      Most Recent Value   Patient Condition  The patient has been stabilized such that within reasonable medical probability, no material deterioration of the patient condition or the condition of the unborn child(lyly) is likely to result from the transfer   Reason for Transfer  Level of Care needed not available at this facility   Benefits of Transfer  Specialized equipment and/or services available at the receiving facility (Include comment)________________________ [facility]]   Risks of Transfer  Potential for delay in receiving treatment   Accepting Physician  Dr Debra Flores MD   Accepting Facility Name, [address] [place] [address]    (Name & Tel number)  Daniellegurwinder[facility]acher[facility] Blocker, [phone]   Sending MD  Dr Tuan Mazariegos,    Provider Certification  General risk, such as traffic hazards, adverse weather conditions, rough terrain or turbulence, possible failure of equipment (including vehicle or aircraft), or consequences of actions of persons outside the control of the transport personnel      RN Documentation      Most [address] [place] [address] Name, [address] [place], [place] [address]olivia [place], [place]    (Name & Tel number)  Agustin Leonardo, [phone]   Report Given to  Amanda Gaviria RN   Transport Mode  Ambulance   Level of Care  Basic life support   Patient Belongings Disposition  Sent with patient   Transfer Date  12/28/19      Follow-up Information    None         Discharge Medication List as of 12/28/2019  2:37 PM      CONTINUE these medications which have NOT CHANGED    Details   albuterol (VENTOLIN HFA) 90 mcg/act inhaler Inhale 2 puffs every 6 (six) hours as needed for wheezing, Historical Med      alendronate (FOSAMAX) 35 mg tablet Take 35 mg by mouth every 7 days Patient takes this med every Sunday , Historical Med      amLODIPine (NORVASC) 5 mg tablet Take 5 mg by mouth daily, Historical Med      ascorbic acid (VITAMIN C) 500 mg tablet Take 500 mg by mouth daily, Historical Med      budesonide-formoterol (SYMBICORT) 160-4 5 mcg/act inhaler Inhale 2 puffs 2 (two) times a day Rinse mouth after use , Historical Med      Calcium-Magnesium-Vitamin D (CALCIUM 500 PO) Take 1,000 mg by mouth daily, Historical Med      carvedilol (COREG) 25 mg tablet Take 25 mg by mouth 2 (two) times a day with meals, Historical Med      enalapril (VASOTEC) 20 mg tablet Take 20 mg by mouth 2 (two) times a day, Historical Med guaiFENesin (MUCINEX) 600 mg 12 hr tablet Take 1 tablet (600 mg total) by mouth 2 (two) times a day, Starting Sun 12/23/2018, Normal      levalbuterol (XOPENEX) 1 25 mg/0 5 mL nebulizer solution Take 0 5 mL (1 25 mg total) by nebulization 3 (three) times a day, Starting Sun 12/23/2018, No Print      lidocaine (LIDODERM) 5 % Apply 1 patch topically daily Remove & Discard patch within 12 hours or as directed by MD, Starting Sun 12/23/2018, Normal      methocarbamol (ROBAXIN) 500 mg tablet Take 1 tablet (500 mg total) by mouth every 6 (six) hours as needed for muscle spasms, Starting Sun 12/23/2018, No Print      nystatin (MYCOSTATIN) 100,000 units/mL suspension Swish and swallow 5 mL (500,000 Units total) 4 (four) times a day, Starting Sun 12/23/2018, Normal      Potassium 99 MG TABS Take 99 mg by mouth, Historical Med      prasugrel (EFFIENT) tablet Take 10 mg by mouth, Historical Med      predniSONE 10 mg tablet Take 2 tablets (20 mg total) by mouth daily For next 3 days, then decrease to 10 mg for 3 days then discontinue, Starting Sun 12/23/2018, No Print      sodium chloride 0 9 % nebulizer solution Take 3 mL by nebulization 3 (three) times a day, Starting Sun 12/23/2018, No Print      venlafaxine (EFFEXOR-XR) 150 mg 24 hr capsule Take 150 mg by mouth daily, Historical Med           No discharge procedures on file      ED Provider  Electronically Signed by           David Mccollum DO  12/28/19 3290

## 2019-12-28 NOTE — ED NOTES
Insurance Authorization for admission:   Patient enrolled in Medicare A and B: no precert required    EVS (Eligibility Verification System) called - [phone]    Automated system indicates: patient is not eligible for [employer] Authorization for Transportation:    Patient enrolled in Medicare A and B: no authorization required

## 2019-12-28 NOTE — ED CARE HANDOFF
Emergency Department Sign Out Note         Sign out and transfer of care from [facility]  See Separate Emergency Department note  The patient, Isamar Deluna, was evaluated by the previous provider for Anorexia, SI  Workup Completed:  Medical evaluation clearance  Patient has been seen by crisis  A 201 is signed  Pending placement  ED Course / Workup Pending (followup): Psychiatric placement  Still awaiting urinalysis  ED Course as of Dec 28 0339   Sat Dec 28, 2019   0338 Bacteria, UA(!): Moderate     Procedures  MDM  Number of Diagnoses or Management Options  Anorexia:   Suicidal ideation:   Diagnosis management comments: Patient is suicidal, signed a 201  She is medically cleared for psychiatric treatment  Awaiting placement into a psychiatric facility  Patient is urinary tract infection  She is treated with Keflex  She is awaiting evaluation by -  Amount and/or Complexity of Data Reviewed  Clinical lab tests: reviewed        Disposition  Final diagnoses:   Anorexia   Suicidal ideation     Time reflects when diagnosis was documented in both MDM as applicable and the Disposition within this note     Time User Action Codes Description Comment    12/28/2019  3:02 AM Carol Segura Add [R63 0] Anorexia     12/28/2019  3:02 AM Marissa Segura Add [R45 851] Suicidal ideation       ED Disposition     None      MD Documentation      Most Recent Value   Sending MD Dr Rikki Small, DO      Follow-up Information    None       Patient's Medications   Discharge Prescriptions    No medications on file     No discharge procedures on file         ED Provider  Electronically Signed by     Sheila Crespo,   12/28/19 Sara 1343,   12/28/19 9331

## 2019-12-28 NOTE — ED NOTES
CW received return call from [facility]  As per Claudene Kitchens is 13:00  Transport paperwork placed on pt's chart  CW advised pt's nurse w/ETA  Nurse to nurse report required 1 hr prior to arrival [phone]      TDS, CW

## 2019-12-28 NOTE — PLAN OF CARE
Problem: Alteration in Thoughts and Perception  Goal: Treatment Goal: Gain control of psychotic behaviors/thinking, reduce/eliminate presenting symptoms and demonstrate improved reality functioning upon discharge  Outcome: Progressing  Goal: Verbalize thoughts and feelings  Description  Interventions:  - Promote a nonjudgmental and trusting relationship with the patient through active listening and therapeutic communication  - Assess patient's level of functioning, behavior and potential for risk  - Engage patient in 1 on 1 interactions  - Encourage patient to express fears, feelings, frustrations, and discuss symptoms    - [place] patient to reality, help patient recognize reality-based thinking   - Administer medications as ordered and assess for potential side effects  - Provide the patient education related to the signs and symptoms of the illness and desired effects of prescribed medications  Outcome: Progressing  Goal: Refrain from acting on delusional thinking/internal stimuli  Description  Interventions:  - Monitor patient closely, per order   - Utilize least restrictive measures   - Set reasonable limits, give positive feedback for acceptable   - Administer medications as ordered and monitor of potential side effects  Outcome: Progressing  Goal: Agree to be compliant with medication regime, as prescribed and report medication side effects  Description  Interventions:  - Offer appropriate PRN medication and supervise ingestion; conduct AIMS, as needed   Outcome: Progressing  Goal: Attend and participate in unit activities, including therapeutic, recreational, and educational groups  Description  Interventions:  -Encourage Visitation and family involvement in care  Outcome: Progressing  Goal: Recognize dysfunctional thoughts, communicate reality-based thoughts at the time of discharge  Description  Interventions:  - Provide medication and psycho-education to assist patient in compliance and developing insight into his/her illness   Outcome: Progressing  Goal: Complete daily ADLs, including personal hygiene independently, as able  Description  Interventions:  - Observe, teach, and assist patient with ADLS  - Monitor and promote a balance of rest/activity, with adequate nutrition and elimination   Outcome: Progressing     Problem: Ineffective Coping  Goal: Cooperates with admission process  Description  Interventions:   - Complete admission process  Outcome: Progressing  Goal: Identifies ineffective coping skills  Outcome: Progressing  Goal: Identifies healthy coping skills  Outcome: Progressing  Goal: Demonstrates healthy coping skills  Outcome: Progressing  Goal: Participates in unit activities  Description  Interventions:  - Provide therapeutic environment   - Provide required programming   - Redirect inappropriate behaviors   Outcome: Progressing  Goal: Patient/Family participate in treatment and DC plans  Description  Interventions:  - Provide therapeutic environment  Outcome: Progressing  Goal: Patient/Family verbalizes awareness of resources  Outcome: Progressing  Goal: Understands least restrictive measures  Description  Interventions:  - Utilize least restrictive behavior  Outcome: Progressing  Goal: Free from restraint events  Description  - Utilize least restrictive measures   - Provide behavioral interventions   - Redirect inappropriate behaviors   Outcome: Progressing     Problem: Risk for Self Injury/Neglect  Goal: Treatment Goal: Remain safe during length of stay, learn and adopt new coping skills, and be free of self-injurious ideation, impulses and acts at the time of discharge  Outcome: Progressing  Goal: Verbalize thoughts and feelings  Description  Interventions:  - Assess and re-assess patient's lethality and potential for self-injury  - Engage patient in 1:1 interactions, daily, for a minimum of 15 minutes  - Encourage patient to express feelings, fears, frustrations, hopes  - Establish rapport/trust with patient   Outcome: Progressing  Goal: Refrain from harming self  Description  Interventions:  - Monitor patient closely, per order  - Develop a trusting relationship  - Supervise medication ingestion, monitor effects and side effects   Outcome: Progressing  Goal: Attend and participate in unit activities, including therapeutic, recreational, and educational groups  Description  Interventions:  - Provide therapeutic and educational activities daily, encourage attendance and participation, and document same in the medical record  - Obtain collateral information, encourage visitation and family involvement in care   Outcome: Progressing  Goal: Recognize maladaptive responses and adopt new coping mechanisms  Outcome: Progressing  Goal: Complete daily ADLs, including personal hygiene independently, as able  Description  Interventions:  - Observe, teach, and assist patient with ADLS  - Monitor and promote a balance of rest/activity, with adequate nutrition and elimination  Outcome: Progressing     Problem: Depression  Goal: Treatment Goal: Demonstrate behavioral control of depressive symptoms, verbalize feelings of improved mood/affect, and adopt new coping skills prior to discharge  Outcome: Progressing  Goal: Verbalize thoughts and feelings  Description  Interventions:  - Assess and re-assess patient's level of risk   - Engage patient in 1:1 interactions, daily, for a minimum of 15 minutes   - Encourage patient to express feelings, fears, frustrations, hopes   Outcome: Progressing  Goal: Refrain from harming self  Description  Interventions:  - Monitor patient closely, per order   - Supervise medication ingestion, monitor effects and side effects   Outcome: Progressing  Goal: Refrain from isolation  Description  Interventions:  - Develop a trusting relationship   - Encourage socialization   Outcome: Progressing  Goal: Refrain from self-neglect  Outcome: Progressing  Goal: Attend and participate in unit activities, including therapeutic, recreational, and educational groups  Description  Interventions:  - Provide therapeutic and educational activities daily, encourage attendance and participation, and document same in the medical record   Outcome: Progressing  Goal: Complete daily ADLs, including personal hygiene independently, as able  Description  Interventions:  - Observe, teach, and assist patient with ADLS  -  Monitor and promote a balance of rest/activity, with adequate nutrition and elimination   Outcome: Progressing     Problem: Anxiety  Goal: Anxiety is at manageable level  Description  Interventions:  - Assess and monitor patient's anxiety level  - Monitor for signs and symptoms (heart palpitations, chest pain, shortness of breath, headaches, nausea, feeling jumpy, restlessness, irritable, apprehensive)  - Collaborate with interdisciplinary team and initiate plan and interventions as ordered    - [place] patient to unit/surroundings  - Explain treatment plan  - Encourage participation in care  - Encourage verbalization of concerns/fears  - Identify coping mechanisms  - Assist in developing anxiety-reducing skills  - Administer/offer alternative therapies  - Limit or eliminate stimulants  Outcome: Progressing

## 2019-12-28 NOTE — PROGRESS NOTES
White PT:  1 pair of black sleepers([employer])  1 hair brush   1 green sweater  1 white/beige sweater  1 long white under pant  6 white under wears  3 colorate under wears  1 beige bra  1 pair of white socks  1 pair of white/blue socks  1 black/white/grey PJ's pant    In PT's room contraband:  1 black suitcase  1 blue makeup bag with makeup inside  1 red/pink makeup bag with (0) inside  1 black sweat pant  1 1 yellow back item  1 grey/blue sweat pant  3 mail envelopes plus 1 photo card  1 box of denture cleanse    In safe: number [phone]  2 yellow/blue stone rings

## 2019-12-28 NOTE — ED NOTES
[facility] faxed referral to [address] [facility] for review  Vivienne called [facility] to report need to determine availability of bed due to patient having MRSA and will call back

## 2019-12-29 PROCEDURE — 94640 AIRWAY INHALATION TREATMENT: CPT

## 2019-12-29 PROCEDURE — 94760 N-INVAS EAR/PLS OXIMETRY 1: CPT

## 2019-12-29 PROCEDURE — 99231 SBSQ HOSP IP/OBS SF/LOW 25: CPT | Performed by: PSYCHIATRY & NEUROLOGY

## 2019-12-29 RX ORDER — ESCITALOPRAM OXALATE 5 MG/1
5 TABLET ORAL DAILY
Status: DISCONTINUED | OUTPATIENT
Start: 2019-12-30 | End: 2019-12-30

## 2019-12-29 RX ORDER — LOPERAMIDE HYDROCHLORIDE 2 MG/1
2 CAPSULE ORAL 3 TIMES DAILY PRN
Status: DISPENSED | OUTPATIENT
Start: 2019-12-29 | End: 2020-01-05

## 2019-12-29 RX ORDER — CIPROFLOXACIN 500 MG/1
500 TABLET, FILM COATED ORAL EVERY 12 HOURS SCHEDULED
Status: DISCONTINUED | OUTPATIENT
Start: 2019-12-29 | End: 2019-12-31

## 2019-12-29 RX ADMIN — CIPROFLOXACIN HYDROCHLORIDE 500 MG: 500 TABLET, FILM COATED ORAL at 21:25

## 2019-12-29 RX ADMIN — LEVALBUTEROL HYDROCHLORIDE 1.25 MG: 1.25 SOLUTION, CONCENTRATE RESPIRATORY (INHALATION) at 13:54

## 2019-12-29 RX ADMIN — CARVEDILOL 25 MG: 25 TABLET, FILM COATED ORAL at 08:56

## 2019-12-29 RX ADMIN — LEVALBUTEROL HYDROCHLORIDE 1.25 MG: 1.25 SOLUTION, CONCENTRATE RESPIRATORY (INHALATION) at 08:12

## 2019-12-29 RX ADMIN — LEVALBUTEROL HYDROCHLORIDE 1.25 MG: 1.25 SOLUTION, CONCENTRATE RESPIRATORY (INHALATION) at 20:22

## 2019-12-29 RX ADMIN — HYDROCHLOROTHIAZIDE 25 MG: 12.5 TABLET ORAL at 08:56

## 2019-12-29 RX ADMIN — ISODIUM CHLORIDE 3 ML: 0.03 SOLUTION RESPIRATORY (INHALATION) at 13:54

## 2019-12-29 RX ADMIN — ISODIUM CHLORIDE 3 ML: 0.03 SOLUTION RESPIRATORY (INHALATION) at 20:22

## 2019-12-29 RX ADMIN — LISINOPRIL 20 MG: 20 TABLET ORAL at 08:56

## 2019-12-29 RX ADMIN — TRAZODONE HYDROCHLORIDE 50 MG: 50 TABLET ORAL at 21:33

## 2019-12-29 RX ADMIN — CALCIUM 2 TABLET: 500 TABLET ORAL at 08:55

## 2019-12-29 RX ADMIN — LOPERAMIDE HYDROCHLORIDE 2 MG: 2 CAPSULE ORAL at 21:58

## 2019-12-29 RX ADMIN — ACETAMINOPHEN 975 MG: 325 TABLET ORAL at 15:37

## 2019-12-29 RX ADMIN — AMLODIPINE BESYLATE 5 MG: 5 TABLET ORAL at 08:55

## 2019-12-29 RX ADMIN — ISODIUM CHLORIDE 3 ML: 0.03 SOLUTION RESPIRATORY (INHALATION) at 08:12

## 2019-12-29 RX ADMIN — PREDNISONE 20 MG: 10 TABLET ORAL at 08:56

## 2019-12-29 NOTE — PROGRESS NOTES
Patient accompanied to breakfast by staff with walker assist, for safety, and O2 via NC  Gait steady; safety reinforced; respirations easy at 98% PO  Patient alert, oriented, pleasant and receptive to approach  Denies A/V hallucinations/delusions  Readily discloses history and stressors contributing to increased depression/ anxiety, passive SI without intent or plan  "Just thoughts of 'when will this be over'? "--making reference to chronic intense pain, lonliness, loss of independence, limited support, significant health concerns  Reported having incurred 2 vertebral fx's 2 years ago for which 2 surgical interventions were ineffective  Patient relayed a subsequent downhill course of ongoing medical challenges, including an MI in Sept '18, exaccerbation of COPD, adverse reaction to anesthesia used during further spinal procedures in the Fall of '19, and progressive dependence in a formerly active and independent individual  She stated that she has been rarely out of her apartment since June; has had the support of one of her 3 sons, whom she believes is now feeling the stressful effects; has not had contact from another son for the past 10 years; no support reported from the 3rd son  Patient stated that she lost 30 lbs over the past few months due to repeated bouts of N/V and ill effects of Colitis and IBS  Patient presently reported 2 episodes of diarrhea since breakfast  Stated she takes an 'over-the-counter' anti- diarrheal med which is always effective  She does not recall the name  PC placed to Dr Lorena Long; VM message left requesting an order related to same  Patient has remained out of her room throughout the shift  Cooperative with unit protocol  Will continue to encourage expression of thoughts/ feelings and assist with unit transition

## 2019-12-29 NOTE — PROGRESS NOTES
Maintain 1:1 observation due to poor safety awareness and unpredictable behaviors  Patient on continuous 2L O2 via nasal canula  No SOB or distress noted  Patient appears to be sleeping without difficulty throughout the night during safety checks  Patient was up to the BR x2  No behavioral issues  No complaints or concerns  Will continue to monitor safety and behaviors every 7 minutes

## 2019-12-29 NOTE — ASSESSMENT & PLAN NOTE
Will continue with Norvasc 5 mg  Patient also on Coreg, Vasotec 20 mg daily and hydrochlorothiazide  Adjust medications according to blood pressure response throughout the course of admission

## 2019-12-29 NOTE — PLAN OF CARE
Problem: Alteration in Thoughts and Perception  Goal: Verbalize thoughts and feelings  Description  Interventions:  - Promote a nonjudgmental and trusting relationship with the patient through active listening and therapeutic communication  - Assess patient's level of functioning, behavior and potential for risk  - Engage patient in 1 on 1 interactions  - Encourage patient to express fears, feelings, frustrations, and discuss symptoms    - [place] patient to reality, help patient recognize reality-based thinking   - Administer medications as ordered and assess for potential side effects  - Provide the patient education related to the signs and symptoms of the illness and desired effects of prescribed medications  Outcome: Progressing     Problem: Ineffective Coping  Goal: Participates in unit activities  Description  Interventions:  - Provide therapeutic environment   - Provide required programming   - Redirect inappropriate behaviors   Outcome: Progressing

## 2019-12-29 NOTE — PROGRESS NOTES
Pt cooperative with admission process  Admits to increasing depression in recent weeks  Believes she has had a 30 lb weight loss due to lack of appetite in recent months  Chronic back pain and isolation are stressors  Pt requesting a private room but appears to socialize while in the dining room  Limited appetite but ate the yogurt that was offered  Pt able to ambulate independently and has appropriate safety awareness

## 2019-12-29 NOTE — PROGRESS NOTES
Maintain 2 L O2 via nasal canula SaO2 was 100%  Patient out in the milieu social with peers and staff  Ate HS snack but refused group due to back pain  Upon approach patient's mood and affect is flat and depressed  Patient rated her depression 8/10 and anxiety 4/10  Patient stated her anxiety increases due to her depression  Patient does have an irritable edge and a spunkiness about her  Patient complained of 8/10 back pain  Gave 975 mg Tylenol given for symptoms  Gave PRN 50 mg trazodone per patient's request for sleep  Will continue to monitor safety and behaviors every 7 minutes

## 2019-12-29 NOTE — RESPIRATORY THERAPY NOTE
RT Protocol Note  Garcia Mow 69 y o  female MRN: [id number]  Unit/Bed#: [facility]- Encounter: [id number]    Assessment    Active Problems:    COPD exacerbation ([place] [address] )    Essential hypertension    Acute low back pain    CAD (coronary artery disease), native coronary artery      Home Pulmonary Medications:    Home Devices/Therapy: Home O2(albuterol MDI Q6prn/whz, Symbicort, 02 4lpm )    Past Medical History:   Diagnosis Date    Anxiety     Cardiac disease     Chronic pain disorder     COPD (chronic obstructive pulmonary disease) ([place] [address] )     Depression     Heart disease     Hyperlipidemia     Hypertension     MI (myocardial infarction) ([place] [address] )     Renal disorder     benign kidney tumor     Social History     Socioeconomic History    Marital status:      Spouse name: None    Number of children: None    Years of education: None    Highest education level: None   Occupational History    None   Social Needs    Financial resource strain: None    Food insecurity:     Worry: None     Inability: None    Transportation needs:     Medical: None     Non-medical: None   Tobacco Use    Smoking status: Former Smoker     Packs/day: 0 00     Years: 0 00     Pack years: 0 00     Types: Cigarettes     Last attempt to quit: 2018     Years since quittin 1    Smokeless tobacco: Never Used   Substance and Sexual Activity    Alcohol use: Never     Frequency: Never    Drug use: No    Sexual activity: Not Currently   Lifestyle    Physical activity:     Days per week: None     Minutes per session: None    Stress: None   Relationships    Social connections:     Talks on phone: None     Gets together: None     Attends [employer] service: None     Active member of club or organization: None     Attends meetings of clubs or organizations: None     Relationship status: None    Intimate partner violence:     Fear of current or ex partner: None     Emotionally abused: None     Physically abused: None Forced sexual activity: None   Other Topics Concern    None   Social History Narrative    None       Subjective Will continue prior orders from Dr María Pino and continue to eval  Pt  And protocol as needed  Objective    Physical Exam:   Assessment Type: (P) During-treatment  General Appearance: (P) Alert, Awake  Respiratory Pattern: (P) Dyspnea with exertion  Chest Assessment: (P) Chest expansion symmetrical  Bilateral Breath Sounds: (P) Clear, Diminished    Vitals:  Blood pressure 107/62, pulse 96, temperature 97 6 °F (36 4 °C), temperature source Temporal, resp  rate 17, height 5' 7" (1 702 m), weight 54 6 kg (120 lb 5 9 oz), SpO2 98 %, not currently breastfeeding  Imaging and other studies: I have personally reviewed pertinent reports  Plan    Respiratory Plan: (P) No distress/Pulmonary history(Hx : COPD,resp  failure and pneumonia)        Resp Comments: pt  is cooperative for therapy

## 2019-12-29 NOTE — CONSULTS
Recommendations for Discharge:  · Per Primary Service    Counseling / Coordination of Care Time: 45 minutes  Greater than 50% of total time spent on patient counseling and coordination of care  History of Present Illness:  Roscoe Kayser is a 69 y o  female who is originally admitted to the emergency room then to the psychiatric service service due to worsening depression and suicidal ideation  We are consulted for management of patient's medical comorbidities       Review of Systems:  Review of Systems   Constitutional: Positive for activity change  HENT: Negative  Eyes: Negative  Respiratory: Positive for cough, shortness of breath and wheezing  Cardiovascular: Negative  Gastrointestinal: Negative  Endocrine: Negative  Genitourinary: Negative  Musculoskeletal: Positive for arthralgias and back pain  Skin: Negative  Allergic/Immunologic: Negative  Neurological: Negative  Past Medical and Surgical History:   Past Medical History:   Diagnosis Date    Anxiety     Cardiac disease     Chronic pain disorder     COPD [facility]chronic obstructive pulmonary disease) ([facility] )     Depression     Heart disease     Hyperlipidemia     Hypertension     MI (myocardial infarction) ([facility] )     Renal disorder     benign kidney tumor       Past Surgical History:   Procedure Laterality Date    APPENDECTOMY      ELBOW BURSA SURGERY Left 02/2018    D/T MRSA INFECTION    SPINAL FUSION      L7 L9       Meds/Allergies:  all medications and allergies reviewed    Allergies:    Allergies   Allergen Reactions    Sulfa Antibiotics Anaphylaxis    Niacin     Statins Other (See Comments)     cramps       Social History:     Marital Status:     Substance Use History:   Social History     Substance and Sexual Activity   Alcohol Use Never    Frequency: Never     Social History     Tobacco Use   Smoking Status Former Smoker    Packs/day: 0 00    Years: 0 00    Pack years: 0 00    Types: Cigarettes  Last attempt to quit: 2018    Years since quittin 1   Smokeless Tobacco Never Used     Social History     Substance and Sexual Activity   Drug Use No       Family History:  I have reviewed the patients family history    Physical Exam:   Vitals:   Blood Pressure: 107/62 (19 162)  Pulse: 96 (19)  Temperature: 97 6 °F (36 4 °C)(97 6) (19)  Temp Source: Temporal (19)  Respirations: 17(17) (19)  Height: 5' 7" (170 2 cm)(5' 7") (19)  Weight - Scale: 54 6 kg (120 lb 5 9 oz)(54 6) (19)  SpO2: 93 %(93) (19)    Physical Exam   Constitutional: She is oriented to person, place, and time  She appears well-developed and well-nourished  Eyes: Pupils are equal, round, and reactive to light  Conjunctivae and EOM are normal    Neck: Normal range of motion  Neck supple  Cardiovascular: Normal rate, regular rhythm and normal heart sounds  Pulmonary/Chest: Effort normal and breath sounds normal    Abdominal: Soft  Bowel sounds are normal    Musculoskeletal: Normal range of motion  Neurological: She is alert and oriented to person, place, and time  Skin: Skin is warm and dry  Nursing note and vitals reviewed  Additional Data:   Lab Results: I have personally reviewed pertinent reports  Results from last 7 days   Lab Units 19  0006   WBC Thousand/uL 10 95*   HEMOGLOBIN g/dL 10 6*   HEMATOCRIT % 33 5*   PLATELETS Thousands/uL 223   LYMPHO PCT % 69*   MONO PCT % 6   EOS PCT % 2     Results from last 7 days   Lab Units 19  0006   POTASSIUM mmol/L 3 5   CHLORIDE mmol/L 102   CO2 mmol/L 32   BUN mg/dL 24   CREATININE mg/dL 0 88   CALCIUM mg/dL 9 5   ALK PHOS U/L 95   ALT U/L 9*   AST U/L 10         Results from last 7 days   Lab Units 19  0006   TROPONIN I ng/mL <0 02     No results found for: HGBA1C        Imaging: I have personally reviewed pertinent reports      No orders to display       EKG, Pathology, and Other Studies Reviewed on Admission:   · EKG:  Pending    ** Please Note: This note has been constructed using a voice recognition system  **    Consult- Monico Charles 1945, 69 y o  female MRN: [id number]    Unit/Bed#: [facility]02 Encounter: [id number]    Primary Care Provider: Mayra Zapata MD   Date and time admitted to hospital: 12/28/2019  3:48 PM      Consults    CAD (coronary artery disease), native coronary artery  Assessment & Plan  Continue beta-blocker  Patient is on Coreg    Acute low back pain  Assessment & Plan  Will continue with well lighted derm patch  PTOT evaluation if indicated  Essential hypertension  Assessment & Plan  Will continue with Norvasc 5 mg  Patient also on Coreg, Vasotec 20 mg daily and hydrochlorothiazide  Adjust medications according to blood pressure response throughout the course of admission  COPD exacerbation ([address] )  Assessment & Plan  Continue with albuterol meter dose inhaler q 6, Symbicort, Xopenex nebulizer

## 2019-12-30 PROBLEM — F33.1 MAJOR DEPRESSIVE DISORDER, RECURRENT EPISODE, MODERATE (HCC): Status: ACTIVE | Noted: 2019-12-30

## 2019-12-30 LAB
ALBUMIN SERPL BCP-MCNC: 4 G/DL (ref 3.5–5.7)
ALP SERPL-CCNC: 73 U/L (ref 55–165)
ALT SERPL W P-5'-P-CCNC: 4 U/L (ref 7–52)
ANION GAP SERPL CALCULATED.3IONS-SCNC: 10 MMOL/L (ref 4–13)
AST SERPL W P-5'-P-CCNC: 9 U/L (ref 13–39)
BACTERIA UR CULT: ABNORMAL
BILIRUB SERPL-MCNC: 0.3 MG/DL (ref 0.2–1)
BUN SERPL-MCNC: 25 MG/DL (ref 7–25)
CALCIUM SERPL-MCNC: 10 MG/DL (ref 8.6–10.5)
CHLORIDE SERPL-SCNC: 103 MMOL/L (ref 98–107)
CO2 SERPL-SCNC: 27 MMOL/L (ref 21–31)
CREAT SERPL-MCNC: 1.4 MG/DL (ref 0.6–1.2)
GFR SERPL CREATININE-BSD FRML MDRD: 37 ML/MIN/1.73SQ M
GLUCOSE SERPL-MCNC: 167 MG/DL (ref 65–99)
IRON SERPL-MCNC: 96 UG/DL (ref 50–170)
POTASSIUM SERPL-SCNC: 3.9 MMOL/L (ref 3.5–5.5)
PROT SERPL-MCNC: 7.4 G/DL (ref 6.4–8.9)
SODIUM SERPL-SCNC: 140 MMOL/L (ref 134–143)
VIT B12 SERPL-MCNC: 440 PG/ML (ref 100–900)

## 2019-12-30 PROCEDURE — 94640 AIRWAY INHALATION TREATMENT: CPT

## 2019-12-30 PROCEDURE — 82607 VITAMIN B-12: CPT | Performed by: INTERNAL MEDICINE

## 2019-12-30 PROCEDURE — 94760 N-INVAS EAR/PLS OXIMETRY 1: CPT

## 2019-12-30 PROCEDURE — 83540 ASSAY OF IRON: CPT | Performed by: INTERNAL MEDICINE

## 2019-12-30 PROCEDURE — 87081 CULTURE SCREEN ONLY: CPT | Performed by: INTERNAL MEDICINE

## 2019-12-30 PROCEDURE — 80053 COMPREHEN METABOLIC PANEL: CPT | Performed by: INTERNAL MEDICINE

## 2019-12-30 PROCEDURE — 99232 SBSQ HOSP IP/OBS MODERATE 35: CPT | Performed by: PSYCHIATRY & NEUROLOGY

## 2019-12-30 RX ORDER — ESCITALOPRAM OXALATE 10 MG/1
10 TABLET ORAL DAILY
Status: DISCONTINUED | OUTPATIENT
Start: 2019-12-31 | End: 2020-01-08 | Stop reason: HOSPADM

## 2019-12-30 RX ADMIN — LEVALBUTEROL HYDROCHLORIDE 1.25 MG: 1.25 SOLUTION, CONCENTRATE RESPIRATORY (INHALATION) at 08:27

## 2019-12-30 RX ADMIN — LISINOPRIL 20 MG: 20 TABLET ORAL at 08:25

## 2019-12-30 RX ADMIN — CIPROFLOXACIN HYDROCHLORIDE 500 MG: 500 TABLET, FILM COATED ORAL at 22:53

## 2019-12-30 RX ADMIN — ISODIUM CHLORIDE 3 ML: 0.03 SOLUTION RESPIRATORY (INHALATION) at 08:27

## 2019-12-30 RX ADMIN — ACETAMINOPHEN 975 MG: 325 TABLET ORAL at 17:45

## 2019-12-30 RX ADMIN — HYDROXYZINE HYDROCHLORIDE 25 MG: 25 TABLET ORAL at 00:29

## 2019-12-30 RX ADMIN — ESCITALOPRAM 5 MG: 5 TABLET, FILM COATED ORAL at 08:25

## 2019-12-30 RX ADMIN — CALCIUM 2 TABLET: 500 TABLET ORAL at 08:24

## 2019-12-30 RX ADMIN — TRAZODONE HYDROCHLORIDE 50 MG: 50 TABLET ORAL at 22:53

## 2019-12-30 RX ADMIN — HYDROCHLOROTHIAZIDE 25 MG: 12.5 TABLET ORAL at 08:25

## 2019-12-30 RX ADMIN — LEVALBUTEROL HYDROCHLORIDE 1.25 MG: 1.25 SOLUTION, CONCENTRATE RESPIRATORY (INHALATION) at 21:46

## 2019-12-30 RX ADMIN — PREDNISONE 20 MG: 10 TABLET ORAL at 08:25

## 2019-12-30 RX ADMIN — ACETAMINOPHEN 975 MG: 325 TABLET ORAL at 10:06

## 2019-12-30 RX ADMIN — ACETAMINOPHEN 650 MG: 325 TABLET ORAL at 00:28

## 2019-12-30 RX ADMIN — CIPROFLOXACIN HYDROCHLORIDE 500 MG: 500 TABLET, FILM COATED ORAL at 08:25

## 2019-12-30 RX ADMIN — AMLODIPINE BESYLATE 5 MG: 5 TABLET ORAL at 08:25

## 2019-12-30 RX ADMIN — ISODIUM CHLORIDE 3 ML: 0.03 SOLUTION RESPIRATORY (INHALATION) at 21:46

## 2019-12-30 RX ADMIN — CARVEDILOL 25 MG: 25 TABLET, FILM COATED ORAL at 08:24

## 2019-12-30 NOTE — PROGRESS NOTES
Pt present in the dining room through much of the shift returning to room for naps  Pt walks well with walker but requires assistance for oxygen transport  Pt eager to interact with staff and selectively with peers  Pt requested PRN medication for back pain of 8/10 at 1537  Reported pain reduced to 4/10  Pt maintained on 2L  No breathing distress noted  Pt refused scheduled symbicort  Reported thrush infections in the past that she attributes to this medication  Pt dismissive of explanation regarding proper technique and rinsing after medication administration  Provider advised

## 2019-12-30 NOTE — PROGRESS NOTES
Atarax 25 mg given at 25 for anxiety  Medication effective after 30 minutes  O2 at 2 lpm via NC  Maintained on Q 7 minute safety checks  No acting out, suicidal ideations, and/or homicidal behaviors  No changes in medical condition or further complaints voiced  Fluids maintained at bedside to promote hydration

## 2019-12-30 NOTE — PLAN OF CARE
Problem: Alteration in Thoughts and Perception  Goal: Treatment Goal: Gain control of psychotic behaviors/thinking, reduce/eliminate presenting symptoms and demonstrate improved reality functioning upon discharge  Outcome: Progressing  Goal: Verbalize thoughts and feelings  Description  Interventions:  - Promote a nonjudgmental and trusting relationship with the patient through active listening and therapeutic communication  - Assess patient's level of functioning, behavior and potential for risk  - Engage patient in 1 on 1 interactions  - Encourage patient to express fears, feelings, frustrations, and discuss symptoms    - [place] patient to reality, help patient recognize reality-based thinking   - Administer medications as ordered and assess for potential side effects  - Provide the patient education related to the signs and symptoms of the illness and desired effects of prescribed medications  Outcome: Progressing  Goal: Refrain from acting on delusional thinking/internal stimuli  Description  Interventions:  - Monitor patient closely, per order   - Utilize least restrictive measures   - Set reasonable limits, give positive feedback for acceptable   - Administer medications as ordered and monitor of potential side effects  Outcome: Progressing  Goal: Agree to be compliant with medication regime, as prescribed and report medication side effects  Description  Interventions:  - Offer appropriate PRN medication and supervise ingestion; conduct AIMS, as needed   Outcome: Progressing  Goal: Attend and participate in unit activities, including therapeutic, recreational, and educational groups  Description  Interventions:  -Encourage Visitation and family involvement in care  Outcome: Progressing  Goal: Recognize dysfunctional thoughts, communicate reality-based thoughts at the time of discharge  Description  Interventions:  - Provide medication and psycho-education to assist patient in compliance and developing insight into his/her illness   Outcome: Progressing  Goal: Complete daily ADLs, including personal hygiene independently, as able  Description  Interventions:  - Observe, teach, and assist patient with ADLS  - Monitor and promote a balance of rest/activity, with adequate nutrition and elimination   Outcome: Progressing     Problem: Ineffective Coping  Goal: Cooperates with admission process  Description  Interventions:   - Complete admission process  Outcome: Progressing  Goal: Identifies ineffective coping skills  Outcome: Progressing  Goal: Identifies healthy coping skills  Outcome: Progressing  Goal: Participates in unit activities  Description  Interventions:  - Provide therapeutic environment   - Provide required programming   - Redirect inappropriate behaviors   Outcome: Progressing  Goal: Patient/Family participate in treatment and DC plans  Description  Interventions:  - Provide therapeutic environment  Outcome: Progressing  Goal: Patient/Family verbalizes awareness of resources  Outcome: Progressing  Goal: Understands least restrictive measures  Description  Interventions:  - Utilize least restrictive behavior  Outcome: Progressing  Goal: Free from restraint events  Description  - Utilize least restrictive measures   - Provide behavioral interventions   - Redirect inappropriate behaviors   Outcome: Progressing     Problem: Risk for Self Injury/Neglect  Goal: Treatment Goal: Remain safe during length of stay, learn and adopt new coping skills, and be free of self-injurious ideation, impulses and acts at the time of discharge  Outcome: Progressing  Goal: Verbalize thoughts and feelings  Description  Interventions:  - Assess and re-assess patient's lethality and potential for self-injury  - Engage patient in 1:1 interactions, daily, for a minimum of 15 minutes  - Encourage patient to express feelings, fears, frustrations, hopes  - Establish rapport/trust with patient   Outcome: Progressing  Goal: Refrain from harming self  Description  Interventions:  - Monitor patient closely, per order  - Develop a trusting relationship  - Supervise medication ingestion, monitor effects and side effects   Outcome: Progressing  Goal: Recognize maladaptive responses and adopt new coping mechanisms  Outcome: Progressing  Goal: Complete daily ADLs, including personal hygiene independently, as able  Description  Interventions:  - Observe, teach, and assist patient with ADLS  - Monitor and promote a balance of rest/activity, with adequate nutrition and elimination  Outcome: Progressing     Problem: Depression  Goal: Treatment Goal: Demonstrate behavioral control of depressive symptoms, verbalize feelings of improved mood/affect, and adopt new coping skills prior to discharge  Outcome: Progressing  Goal: Verbalize thoughts and feelings  Description  Interventions:  - Assess and re-assess patient's level of risk   - Engage patient in 1:1 interactions, daily, for a minimum of 15 minutes   - Encourage patient to express feelings, fears, frustrations, hopes   Outcome: Progressing  Goal: Refrain from harming self  Description  Interventions:  - Monitor patient closely, per order   - Supervise medication ingestion, monitor effects and side effects   Outcome: Progressing  Goal: Refrain from isolation  Description  Interventions:  - Develop a trusting relationship   - Encourage socialization   Outcome: Progressing  Goal: Refrain from self-neglect  Outcome: Progressing  Goal: Attend and participate in unit activities, including therapeutic, recreational, and educational groups  Description  Interventions:  - Provide therapeutic and educational activities daily, encourage attendance and participation, and document same in the medical record   Outcome: Progressing  Goal: Complete daily ADLs, including personal hygiene independently, as able  Description  Interventions:  - Observe, teach, and assist patient with ADLS  -  Monitor and promote a balance of rest/activity, with adequate nutrition and elimination   Outcome: Progressing     Problem: Anxiety  Goal: Anxiety is at manageable level  Description  Interventions:  - Assess and monitor patient's anxiety level  - Monitor for signs and symptoms (heart palpitations, chest pain, shortness of breath, headaches, nausea, feeling jumpy, restlessness, irritable, apprehensive)  - Collaborate with interdisciplinary team and initiate plan and interventions as ordered    - [place] patient to unit/surroundings  - Explain treatment plan  - Encourage participation in care  - Encourage verbalization of concerns/fears  - Identify coping mechanisms  - Assist in developing anxiety-reducing skills  - Administer/offer alternative therapies  - Limit or eliminate stimulants  Outcome: Progressing

## 2019-12-30 NOTE — PROGRESS NOTES
12/30/19 1408   Team Meeting   Meeting Type Tx Team Meeting   Initial Conference Date 12/30/19   Next Conference Date 01/29/20   Team Members Present   Team Members Present Physician;Nurse;; Occupational Therapist   Physician Team Member Dr Villa Ervin MD    Nursing Team Member Mindy Marr, RN    Social Work Team Member Carola Alonzo    OT Team Member Henrietta Rob RN (covering RT)   Patient/Family Present   Patient Present Yes   Patient's Family Present No     Initial Team Meeting     Pt and team met to review tx plan/goals;   Pt goal: "to get out of my depression"; pt agreeable to goals and signed; copy on chart
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
## 2019-12-30 NOTE — PROGRESS NOTES
[id number]  Patient present in the community throughout this shift  She is calm, pleasant, appropriate in conversation and jokes with staff and peers  She does admit to feeling depressed lately but states it is all due to her physical health and feeling like a burden on her son  She denies any suicidal thoughts and feels hopeful for the future  She was encouraged to speak to SW about home health care  She states she is starting to feel better but understands her medications may take several weeks to take full effect  She is on continuous O2 at 4L  No complaints of SOB  Requested 975mg of Tylenol for back pain at 1745 and verbalized relief  Will continue monitoring

## 2019-12-30 NOTE — PROGRESS NOTES
Pt visible out in milieu for breakfast  Pt on 4L O2, stated this is what she is on at home  Sats 90% at 2L at rest  Received breathing treatment  Pt states she was tired of the pain and being dependent on her son that she no longer wanted to live  Denies SI/HI/AH/VH  Alert and oriented x 4  Pt states anxiety and depression isn't bad but would be better if she was home because she would be able to do her eyebrows and wear her watch unlike this place she is in  Pt has pain when she walks only due to sciatica  Pt ambulates well with walker  Q 7 min checks maintained

## 2019-12-30 NOTE — PLAN OF CARE
Problem: Alteration in Thoughts and Perception  Goal: Treatment Goal: Gain control of psychotic behaviors/thinking, reduce/eliminate presenting symptoms and demonstrate improved reality functioning upon discharge  Outcome: Progressing  Goal: Verbalize thoughts and feelings  Description  Interventions:  - Promote a nonjudgmental and trusting relationship with the patient through active listening and therapeutic communication  - Assess patient's level of functioning, behavior and potential for risk  - Engage patient in 1 on 1 interactions  - Encourage patient to express fears, feelings, frustrations, and discuss symptoms    - [place] patient to reality, help patient recognize reality-based thinking   - Administer medications as ordered and assess for potential side effects  - Provide the patient education related to the signs and symptoms of the illness and desired effects of prescribed medications  Outcome: Progressing  Goal: Refrain from acting on delusional thinking/internal stimuli  Description  Interventions:  - Monitor patient closely, per order   - Utilize least restrictive measures   - Set reasonable limits, give positive feedback for acceptable   - Administer medications as ordered and monitor of potential side effects  Outcome: Progressing  Goal: Agree to be compliant with medication regime, as prescribed and report medication side effects  Description  Interventions:  - Offer appropriate PRN medication and supervise ingestion; conduct AIMS, as needed   Outcome: Progressing  Goal: Complete daily ADLs, including personal hygiene independently, as able  Description  Interventions:  - Observe, teach, and assist patient with ADLS  - Monitor and promote a balance of rest/activity, with adequate nutrition and elimination   Outcome: Progressing

## 2019-12-30 NOTE — TREATMENT PLAN
Treatment Plan [address] [facility] 69 y o  female MRN: [id number]    [address]  Encounter: [id number]          Admit Date/Time:  12/28/2019  3:48 PM    Treatment Team: Attending Provider: Viki Clay MD; Consulting Physician: Spenser Franks MD; Certified Nursing Assistant: Alvarez Manley; Patient Care Assistant: Meena Urrutia; Respiratory Therapist: Jojo Lynch RT; Registered Nurse: Christina Farmer RN    Diagnosis: Principal Problem:    Major depressive disorder, recurrent episode, moderate ([facility] [address] )  Active Problems:    COPD exacerbation ([facility] [address] )    Essential hypertension    Acute low back pain    CAD (coronary artery disease), native coronary artery        Patient Strengths: Buddhist affiliation, special hobby/interest, supportive family/friends     Patient Barriers: poor physical health    Progress Toward Goals: ongoing    Recommended Treatment: discharge planning     Treatment Frequency: 1-2 times per week     Expected Discharge Date:     Discharge Plan: referrals as indicated     Treatment Plan Created/Updated By: Viki Clay MD

## 2019-12-30 NOTE — PLAN OF CARE
Problem: Ineffective Coping  Goal: Participates in unit activities  Description  Interventions:  - Provide therapeutic environment   - Provide required programming   - Redirect inappropriate behaviors   Outcome: Progressing   Patient has been cooperative and interactive with peers/ staff  Attended AM and afternoon RT groups  Participated with good attention and focus  Reports feeling emotionally and physically "worn out " Remains motivated toward recovery

## 2019-12-30 NOTE — PROGRESS NOTES
Progress Note - [address] 69 y o  female MRN: [id number]   Unit/Bed#: [facility] 202-02 Encounter: [id number]    Behavior over the last 24 hours: minimal improvement  Gege Loza was seen today  Patient reports that she has been depressed for a long time but her symptoms has been getting worse for the past 2-3 months  She endorses increased anxiety, poor appetite, with intermittent suicide ideation  She denies any active suicidal plan and she is able to contract for safety presently  Patient also struggles in coping with multiple medical conditions including her chronic back pain and COPD  Patient is in need of continuous oxygen administration  Staff reports fair participation in groups and on the unit  Sleep: slept off and on  Appetite: poor  Medication side effects: denies  ROS: all other systems are negative, chronic back pain and SOB    Mental Status Evaluation:    Appearance:  age appropriate   Behavior:  cooperative   Speech:  normal rate and volume   Mood:  anxious   Affect:  appropriate   Thought Process:  goal directed   Associations: intact associations   Thought Content:  no overt delusions   Perceptual Disturbances: none   Risk Potential: Suicidal ideation - Yes, without plan  Homicidal ideation - None at present   Sensorium:  oriented to person, place and time/date   Memory:  recent and remote memory grossly intact   Consciousness:  alert and awake   Attention: attention span and concentration are age appropriate   Insight:  limited   Judgment: limited   Gait/Station: slow gait   Motor Activity: no abnormal movements     Vital signs in last 24 hours:    Temp:  [97 5 °F (36 4 °C)-97 9 °F (36 6 °C)] 97 5 °F (36 4 °C)  HR:  [77-99] 77  Resp:  [18-19] 19  BP: (104-135)/(54-56) 115/56    Laboratory results: I have personally reviewed all pertinent laboratory/tests results      Assessment/Plan   Principal Problem:    Major depressive disorder, recurrent episode, moderate (HCC)  Active Problems:    COPD exacerbation (HCC)    Essential hypertension    Acute low back pain    CAD (coronary artery disease), native coronary artery    Recommended Treatment:     Planned medication and treatment changes:     All current active medications have been reviewed  Encourage group therapy, milieu therapy and occupational therapy  Behavioral Health checks every 7 minutes   Increase Lexapro to 10 mg daily    Current Facility-Administered Medications:  acetaminophen 650 mg Oral Q6H PRN Seraetoslav RADHA Haas MD   acetaminophen 650 mg Oral Q4H PRN Svclarenceslav RADHA Haas MD   acetaminophen 975 mg Oral Q6H PRN Svetoslav P MD Samina   albuterol 2 puff Inhalation Q6H PRN Margarita Alfaro MD   aluminum-magnesium hydroxide-simethicone 30 mL Oral Q4H PRN Iwonasnury Haas MD   amLODIPine 5 mg Oral Daily Margarita Alfaro MD   budesonide-formoterol 2 puff Inhalation BID Margarita Alfaro MD   calcium carbonate 2 tablet Oral Daily With Breakfast Margarita Alfaro MD   carvedilol 25 mg Oral BID With Meals Margarita Alfaro MD   ciprofloxacin 500 mg Oral Q12H [address] Margarita Alfaro MD   escitalopram 5 mg Oral Daily Katerine Salvador MD   haloperidol 2 mg Oral Q8H PRN Iwonaslav RADHA Haas MD   haloperidol lactate 2 mg Intramuscular Q8H PRN Iwonaslav RADHA Haas MD   hydrochlorothiazide 25 mg Oral Daily Margarita Alfaro MD   hydrOXYzine HCL 25 mg Oral Q6H PRN Iwonasnury Haas MD   levalbuterol 1 25 mg Nebulization TID Margarita Alfaro MD   And       sodium chloride 3 mL Nebulization TID Margarita Alfaro MD   lisinopril 20 mg Oral Daily Margarita Alfaro MD   loperamide 2 mg Oral TID PRN Margarita Alfaro MD   magnesium hydroxide 30 mL Oral Daily PRN Iwonasnury Haas MD   predniSONE 20 mg Oral Daily Margarita Alfaro MD   traZODone 50 mg Oral HS PRN Iwonaslajax Haas MD       Risks / Benefits of Treatment:    Risks, benefits, and possible side effects of medications explained to patient and patient verbalizes understanding and agreement for treatment  Counseling / Coordination of Care:    Patient's progress discussed with staff in treatment team meeting  Medications, treatment progress and treatment plan reviewed with patient  Patient's diagnosis and treatment indicated reviewed with patient      Wen Villareal MD 12/30/19
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
## 2019-12-31 LAB
HEMOCCULT STL QL: NEGATIVE
MRSA NOSE QL CULT: NORMAL

## 2019-12-31 PROCEDURE — 94640 AIRWAY INHALATION TREATMENT: CPT

## 2019-12-31 PROCEDURE — 82272 OCCULT BLD FECES 1-3 TESTS: CPT | Performed by: INTERNAL MEDICINE

## 2019-12-31 PROCEDURE — 94760 N-INVAS EAR/PLS OXIMETRY 1: CPT

## 2019-12-31 PROCEDURE — 99232 SBSQ HOSP IP/OBS MODERATE 35: CPT | Performed by: PHYSICIAN ASSISTANT

## 2019-12-31 RX ORDER — LANOLIN ALCOHOL/MO/W.PET/CERES
3 CREAM (GRAM) TOPICAL
Status: DISCONTINUED | OUTPATIENT
Start: 2019-12-31 | End: 2020-01-08 | Stop reason: HOSPADM

## 2019-12-31 RX ORDER — HYDROXYZINE HYDROCHLORIDE 25 MG/1
50 TABLET, FILM COATED ORAL EVERY 6 HOURS PRN
Status: DISCONTINUED | OUTPATIENT
Start: 2019-12-31 | End: 2020-01-08 | Stop reason: HOSPADM

## 2019-12-31 RX ORDER — DIPHENHYDRAMINE HYDROCHLORIDE 50 MG/ML
50 INJECTION INTRAMUSCULAR; INTRAVENOUS EVERY 6 HOURS PRN
Status: DISCONTINUED | OUTPATIENT
Start: 2019-12-31 | End: 2020-01-08 | Stop reason: HOSPADM

## 2019-12-31 RX ORDER — LISINOPRIL 10 MG/1
10 TABLET ORAL DAILY
Status: DISCONTINUED | OUTPATIENT
Start: 2020-01-01 | End: 2020-01-08 | Stop reason: HOSPADM

## 2019-12-31 RX ORDER — DIPHENHYDRAMINE HCL 25 MG
50 TABLET ORAL EVERY 6 HOURS PRN
Status: DISCONTINUED | OUTPATIENT
Start: 2019-12-31 | End: 2020-01-08 | Stop reason: HOSPADM

## 2019-12-31 RX ORDER — HYDROXYZINE HYDROCHLORIDE 25 MG/1
25 TABLET, FILM COATED ORAL EVERY 4 HOURS PRN
Status: DISCONTINUED | OUTPATIENT
Start: 2019-12-31 | End: 2020-01-08 | Stop reason: HOSPADM

## 2019-12-31 RX ORDER — CIPROFLOXACIN 250 MG/1
250 TABLET, FILM COATED ORAL EVERY 12 HOURS SCHEDULED
Status: COMPLETED | OUTPATIENT
Start: 2019-12-31 | End: 2020-01-03

## 2019-12-31 RX ADMIN — TRAZODONE HYDROCHLORIDE 50 MG: 50 TABLET ORAL at 23:17

## 2019-12-31 RX ADMIN — PREDNISONE 20 MG: 10 TABLET ORAL at 08:15

## 2019-12-31 RX ADMIN — CIPROFLOXACIN HYDROCHLORIDE 500 MG: 500 TABLET, FILM COATED ORAL at 08:15

## 2019-12-31 RX ADMIN — CALCIUM 2 TABLET: 500 TABLET ORAL at 08:15

## 2019-12-31 RX ADMIN — ISODIUM CHLORIDE 3 ML: 0.03 SOLUTION RESPIRATORY (INHALATION) at 14:11

## 2019-12-31 RX ADMIN — LEVALBUTEROL HYDROCHLORIDE 1.25 MG: 1.25 SOLUTION, CONCENTRATE RESPIRATORY (INHALATION) at 08:40

## 2019-12-31 RX ADMIN — LISINOPRIL 20 MG: 20 TABLET ORAL at 08:15

## 2019-12-31 RX ADMIN — ACETAMINOPHEN 975 MG: 325 TABLET ORAL at 10:14

## 2019-12-31 RX ADMIN — ISODIUM CHLORIDE 3 ML: 0.03 SOLUTION RESPIRATORY (INHALATION) at 21:13

## 2019-12-31 RX ADMIN — LOPERAMIDE HYDROCHLORIDE 2 MG: 2 CAPSULE ORAL at 12:38

## 2019-12-31 RX ADMIN — CARVEDILOL 25 MG: 25 TABLET, FILM COATED ORAL at 08:16

## 2019-12-31 RX ADMIN — CIPROFLOXACIN HYDROCHLORIDE 250 MG: 250 TABLET, FILM COATED ORAL at 21:01

## 2019-12-31 RX ADMIN — ISODIUM CHLORIDE 3 ML: 0.03 SOLUTION RESPIRATORY (INHALATION) at 08:40

## 2019-12-31 RX ADMIN — ACETAMINOPHEN 975 MG: 325 TABLET ORAL at 19:54

## 2019-12-31 RX ADMIN — AMLODIPINE BESYLATE 5 MG: 5 TABLET ORAL at 08:16

## 2019-12-31 RX ADMIN — ESCITALOPRAM OXALATE 10 MG: 10 TABLET ORAL at 08:16

## 2019-12-31 RX ADMIN — LEVALBUTEROL HYDROCHLORIDE 1.25 MG: 1.25 SOLUTION, CONCENTRATE RESPIRATORY (INHALATION) at 21:13

## 2019-12-31 RX ADMIN — HYDROXYZINE HYDROCHLORIDE 25 MG: 25 TABLET ORAL at 00:32

## 2019-12-31 RX ADMIN — LEVALBUTEROL HYDROCHLORIDE 1.25 MG: 1.25 SOLUTION, CONCENTRATE RESPIRATORY (INHALATION) at 14:11

## 2019-12-31 RX ADMIN — HYDROCHLOROTHIAZIDE 25 MG: 12.5 TABLET ORAL at 08:16

## 2019-12-31 NOTE — PROGRESS NOTES
The following items were dropped off for the PT:    1 purple xl long sleeve shirt  1 royal blue long sleeve shirt  1 lt purple/pink long sleeve shirt  1 red long sleeve shirt  1 dark blue long sleeve shirt  1 long blue flower night gown  1 leopard print house coat    All the shirts are of the brand [employer]      All items have been signed for by the PT

## 2019-12-31 NOTE — PLAN OF CARE
Problem: Risk for Self Injury/Neglect  Goal: Treatment Goal: Remain safe during length of stay, learn and adopt new coping skills, and be free of self-injurious ideation, impulses and acts at the time of discharge  Outcome: Progressing  Goal: Verbalize thoughts and feelings  Description  Interventions:  - Assess and re-assess patient's lethality and potential for self-injury  - Engage patient in 1:1 interactions, daily, for a minimum of 15 minutes  - Encourage patient to express feelings, fears, frustrations, hopes  - Establish rapport/trust with patient   Outcome: Progressing  Goal: Refrain from harming self  Description  Interventions:  - Monitor patient closely, per order  - Develop a trusting relationship  - Supervise medication ingestion, monitor effects and side effects   Outcome: Progressing     Problem: Alteration in Thoughts and Perception  Goal: Treatment Goal: Gain control of psychotic behaviors/thinking, reduce/eliminate presenting symptoms and demonstrate improved reality functioning upon discharge  Outcome: Completed  Goal: Verbalize thoughts and feelings  Description  Interventions:  - Promote a nonjudgmental and trusting relationship with the patient through active listening and therapeutic communication  - Assess patient's level of functioning, behavior and potential for risk  - Engage patient in 1 on 1 interactions  - Encourage patient to express fears, feelings, frustrations, and discuss symptoms    - [place] patient to reality, help patient recognize reality-based thinking   - Administer medications as ordered and assess for potential side effects  - Provide the patient education related to the signs and symptoms of the illness and desired effects of prescribed medications  Outcome: Completed  Goal: Refrain from acting on delusional thinking/internal stimuli  Description  Interventions:  - Monitor patient closely, per order   - Utilize least restrictive measures   - Set reasonable limits, give positive feedback for acceptable   - Administer medications as ordered and monitor of potential side effects  Outcome: Completed  Goal: Agree to be compliant with medication regime, as prescribed and report medication side effects  Description  Interventions:  - Offer appropriate PRN medication and supervise ingestion; conduct AIMS, as needed   Outcome: Completed  Goal: Complete daily ADLs, including personal hygiene independently, as able  Description  Interventions:  - Observe, teach, and assist patient with ADLS  - Monitor and promote a balance of rest/activity, with adequate nutrition and elimination   Outcome: Completed

## 2019-12-31 NOTE — PROGRESS NOTES
Visible out in milieu after breakfast  Affect constricted and mood anxious  Rates anxiety and depression 4/10  Fluids encouraged  Requested/recevied tylenol 975 mg for 8/10 back pain  Denies SI/HI and any hallucinations  Q 7 min checks maintained  1/3 occults collected and sent this morning

## 2019-12-31 NOTE — NURSING NOTE
Pt out to request something for sleep; reports I cant sleep "I'm shaking like a leaf " prn anxiolytic administered

## 2019-12-31 NOTE — PROGRESS NOTES
12/31/19 0987   Team Meeting   Meeting Type Daily Rounds   Team Members Present   Team Members Present Physician;Nurse;;; Occupational Therapist   Physician Team Member Dr Jack Santos; Esteban Bermeo PA-C   Nursing Team Member Jordan Bird, YASIR   Care Management Team Member Maximiliano Harp MS, [facility]   Social Work Team Member Nanci Eaton, [place]   OT Team Member Mirian Georges RN   Patient/Family Present   Patient Present No   Patient's Family Present No     On 4L oxygen, pleasant and visible, irritable last night due to not being able to sleep PRN atarx this helped PT sleep, MRSA swab is pending  PT will return home at D/C no D/C date at this time

## 2019-12-31 NOTE — NURSING NOTE
Pt found to be resting quietly since last note; No acute behavioral issues noted  Q 7 min checks ongoing

## 2019-12-31 NOTE — H&P
Melody Hutchison  VIQ:[birth year] F  FVQ:[id number]    TFW:[id number]  Adm Date: 12/28/2019 1548  3:48 PM   ATT PHY: [facility] Lee [facility]  [address]         Chief Complaint: depression/SI    History of Presenting Illness: Liat Vinson is a(n) 69y o  year old female  presenting to [address] [place] [address] for worsening depression and suicidal ideations  We are asked to follow the patient along with reference to her medical co-morbidities  The patient was seen after reviewing the chart and discussing the case with caring staff  Today during our encounter, the patient is requesting a boost supplement given reported weight loss  Also of note, the patient's renal function acutely worsened over the course of a few days  Creatinine increased from 0 88 to 1 40 and GFR dropped from 65 to 37  Other significant labs include WBC of 10 95, H/H of 10 6/33 5 and UA showing overt evidence of UTI, sensitive to Cipro  Allergies   Allergen Reactions    Sulfa Antibiotics Anaphylaxis    Niacin     Statins Other (See Comments)     cramps       No current facility-administered medications on file prior to encounter  Current Outpatient Medications on File Prior to Encounter   Medication Sig Dispense Refill    albuterol (VENTOLIN HFA) 90 mcg/act inhaler Inhale 2 puffs every 6 (six) hours as needed for wheezing      alendronate (FOSAMAX) 35 mg tablet Take 35 mg by mouth every 7 days Patient takes this med every Sunday   amLODIPine (NORVASC) 5 mg tablet Take 5 mg by mouth daily      budesonide-formoterol (SYMBICORT) 160-4 5 mcg/act inhaler Inhale 2 puffs 2 (two) times a day Rinse mouth after use        carvedilol (COREG) 25 mg tablet Take 25 mg by mouth 2 (two) times a day with meals      enalapril (VASOTEC) 20 mg tablet Take 20 mg by mouth 2 (two) times a day      hydrochlorothiazide (HYDRODIURIL) 25 mg tablet Take 25 mg by mouth daily      levalbuterol ([place]) 1 25 mg/0 5 mL nebulizer solution Take 0 5 mL (1 25 mg total) by nebulization 3 (three) times a day  0    prasugrel (EFFIENT) tablet Take 10 mg by mouth      ascorbic acid (VITAMIN C) 500 mg tablet Take 500 mg by mouth daily      Calcium-Magnesium-Vitamin D (CALCIUM 500 PO) Take 1,000 mg by mouth daily      guaiFENesin (MUCINEX) 600 mg 12 hr tablet Take 1 tablet (600 mg total) by mouth 2 (two) times a day 60 tablet 0    lidocaine (LIDODERM) 5 % Apply 1 patch topically daily Remove & Discard patch within 12 hours or as directed by MD 30 patch 0    methocarbamol (ROBAXIN) 500 mg tablet Take 1 tablet (500 mg total) by mouth every 6 (six) hours as needed for muscle spasms  0    nystatin (MYCOSTATIN) 100,000 units/mL suspension Swish and swallow 5 mL (500,000 Units total) 4 (four) times a day (Patient not taking: Reported on 4/12/2019) 60 mL 0    Potassium 99 MG TABS Take 99 mg by mouth      predniSONE 10 mg tablet Take 2 tablets (20 mg total) by mouth daily For next 3 days, then decrease to 10 mg for 3 days then discontinue  0    sodium chloride 0 9 % nebulizer solution Take 3 mL by nebulization 3 (three) times a day  0    venlafaxine (EFFEXOR-XR) 150 mg 24 hr capsule Take 150 mg by mouth daily         Active Ambulatory Problems     Diagnosis Date Noted    [facility] ) 12/16/2018    Ambulatory dysfunction 12/16/2018    Hypokalemia 12/16/2018    Essential hypertension 12/16/2018    Acute low back pain 12/16/2018    Tobacco abuse 12/16/2018    Acute on chronic respiratory failure with hypoxia (HCC) 12/19/2018    CAD (coronary artery disease), native coronary artery 12/21/2018    Flank pain 04/12/2019    Pneumonia 04/12/2019    Sepsis ([facility] [address] ) 04/12/2019     Resolved Ambulatory Problems     Diagnosis Date Noted    No Resolved Ambulatory Problems     Past Medical History:   Diagnosis Date    Anxiety     Cardiac disease     Chronic pain disorder     [facility]     Depression     Heart disease     Hyperlipidemia     Hypertension     MI [facility]myocardial infarction) ([facility] )     MRSA (methicillin resistant Staphylococcus aureus)     Renal disorder        Past Surgical History:   Procedure Laterality Date    APPENDECTOMY      ELBOW BURSA SURGERY Left 2018    D/T MRSA INFECTION    SPINAL FUSION      L7 L9       Social History     Socioeconomic History    Marital status:      Spouse name: None    Number of children: None    Years of education: None    Highest education level: None   Occupational History    None   Social Needs    Financial resource strain: None    Food insecurity:     Worry: None     Inability: None    Transportation needs:     Medical: None     Non-medical: None   Tobacco Use    Smoking status: Former Smoker     Packs/day: 0 00     Years: 0 00     Pack years: 0 00     Types: Cigarettes     Last attempt to quit: 2018     Years since quittin 1    Smokeless tobacco: Never Used   Substance and Sexual Activity    Alcohol use: Never     Frequency: Never    Drug use: No    Sexual activity: Not Currently   Lifestyle    Physical activity:     Days per week: None     Minutes per session: None    Stress: None   Relationships    Social connections:     Talks on phone: None     Gets together: None     Attends [employer] service: None     Active member of club or organization: None     Attends meetings of clubs or organizations: None     Relationship status: None    Intimate partner violence:     Fear of current or ex partner: None     Emotionally abused: None     Physically abused: None     Forced sexual activity: None   Other Topics Concern    None   Social History Narrative    None       Family History   Problem Relation Age of Onset    Cancer Father        Review of Systems   All other systems reviewed and are negative        Vitals:    19 0702   BP: 129/56   Pulse: 79   Resp: 18   Temp: 97 7 °F (36 5 °C) SpO2: 98%       Physical Exam   Constitutional: Awake and Alert  Well-developed and well-nourished  No distress  HENT:   Head: Normocephalic and atraumatic  Cardiovascular: RRR with normal heart sounds  Exam reveals no friction rub  No murmur heard  Pulmonary/Chest: Effort normal and breath sounds normal  No respiratory distress  Patient has no wheezes, rales, or rhonchi  Abdominal: Soft  Bowel sounds are normal  No distension  There is no tenderness  There is no rebound and no guarding  Neurological: Cranial Nerves grossly intact  No sensory deficit  Coordination normal    Skin: Skin is warm and dry  No rash noted  No diaphoresis  No erythema  No edema  No cyanosis  Assessment     Carlie Giron is a(n) 69y o  year old female with MDD  1  Cardiac with history of Hypertension, Hyperlipidemia, and CAD s/p MI  Amlodipine 5mg, Coreg 25mg BID, HCTZ 25mg, and lisinopril 10mg  2  COPD  Symbicort, Xopenex, PRN albuterol, prednisone 20mg, supplemental oxygen  3  DARLING  Decreased Cipro to 250mg every 12 hours, lisinopril to 10mg once daily, and orders to encourage fluids  BMP in 2 days  4  UTI  Cipro has been changed to 250mg every 12 hours (day #2/5) in view of worsened renal function  5  Weight loss  Ensure TID  6  DJD/OA  Tylenol as needed  7  Diarrhea  Imodium as needed  8  MDD  Defer to psychiatry  Prognosis: Fair  Discharge Plan: In progress  Advanced Directives: I have discussed in detail the patient the advanced directives  The patient does have a POA and a living will  First contact/POA is Nhung Seth who can be reached at [phone]  When discussing cardiac and pulmonary resuscitation efforts with the patient, the patient wishes to be a FULL CODE    I have spent more than 50 minutes gathering data, doing physical examination, and discussing the advanced directives, which was witnessed by caring staff      The patient was discussed with Dr Villa Kelly and he is in agreement with the above note

## 2019-12-31 NOTE — PLAN OF CARE
Problem: Ineffective Coping  Goal: Participates in unit activities  Description  Interventions:  - Provide therapeutic environment   - Provide required programming   - Redirect inappropriate behaviors   Outcome: Progressing   Remains social and interactive  Participated in AM and afternoon RT groups with good attention and concentration  Appropriate feedback offered to others

## 2020-01-01 LAB
25(OH)D3 SERPL-MCNC: 28.3 NG/ML (ref 30–100)
HEMOCCULT STL QL: NEGATIVE

## 2020-01-01 PROCEDURE — 82306 VITAMIN D 25 HYDROXY: CPT | Performed by: PHYSICIAN ASSISTANT

## 2020-01-01 PROCEDURE — 82272 OCCULT BLD FECES 1-3 TESTS: CPT | Performed by: PHYSICIAN ASSISTANT

## 2020-01-01 PROCEDURE — 99232 SBSQ HOSP IP/OBS MODERATE 35: CPT | Performed by: NURSE PRACTITIONER

## 2020-01-01 PROCEDURE — 94760 N-INVAS EAR/PLS OXIMETRY 1: CPT

## 2020-01-01 PROCEDURE — 94640 AIRWAY INHALATION TREATMENT: CPT

## 2020-01-01 RX ADMIN — TRAZODONE HYDROCHLORIDE 50 MG: 50 TABLET ORAL at 22:30

## 2020-01-01 RX ADMIN — AMLODIPINE BESYLATE 5 MG: 5 TABLET ORAL at 08:45

## 2020-01-01 RX ADMIN — LISINOPRIL 10 MG: 10 TABLET ORAL at 08:44

## 2020-01-01 RX ADMIN — CIPROFLOXACIN HYDROCHLORIDE 250 MG: 250 TABLET, FILM COATED ORAL at 09:30

## 2020-01-01 RX ADMIN — ACETAMINOPHEN 650 MG: 325 TABLET ORAL at 14:31

## 2020-01-01 RX ADMIN — ISODIUM CHLORIDE 3 ML: 0.03 SOLUTION RESPIRATORY (INHALATION) at 15:27

## 2020-01-01 RX ADMIN — CALCIUM 2 TABLET: 500 TABLET ORAL at 08:45

## 2020-01-01 RX ADMIN — ISODIUM CHLORIDE 3 ML: 0.03 SOLUTION RESPIRATORY (INHALATION) at 21:01

## 2020-01-01 RX ADMIN — PREDNISONE 20 MG: 10 TABLET ORAL at 08:44

## 2020-01-01 RX ADMIN — LEVALBUTEROL HYDROCHLORIDE 1.25 MG: 1.25 SOLUTION, CONCENTRATE RESPIRATORY (INHALATION) at 09:46

## 2020-01-01 RX ADMIN — ESCITALOPRAM OXALATE 10 MG: 10 TABLET ORAL at 08:46

## 2020-01-01 RX ADMIN — ISODIUM CHLORIDE 3 ML: 0.03 SOLUTION RESPIRATORY (INHALATION) at 09:48

## 2020-01-01 RX ADMIN — LEVALBUTEROL HYDROCHLORIDE 1.25 MG: 1.25 SOLUTION, CONCENTRATE RESPIRATORY (INHALATION) at 15:26

## 2020-01-01 RX ADMIN — CIPROFLOXACIN HYDROCHLORIDE 250 MG: 250 TABLET, FILM COATED ORAL at 21:17

## 2020-01-01 RX ADMIN — LEVALBUTEROL HYDROCHLORIDE 1.25 MG: 1.25 SOLUTION, CONCENTRATE RESPIRATORY (INHALATION) at 21:01

## 2020-01-01 RX ADMIN — HYDROCHLOROTHIAZIDE 25 MG: 12.5 TABLET ORAL at 08:44

## 2020-01-01 RX ADMIN — CARVEDILOL 25 MG: 25 TABLET, FILM COATED ORAL at 08:44

## 2020-01-01 NOTE — PROGRESS NOTES
Progress Note - Behavioral Health   Laura Myers 69 y o  female MRN: [id number]  Unit/Bed#Joyce Simpson 202-02 Encounter: [id number]    The patient was seen for continuing care and reviewed with treatment team   Gegeerich Loza initially presents with mild irritability but was overall humorous and pleasant  Her main concerns are her home environment, stating she is alone and is limited in what she can do for herself  Her son that lives nearby is very helpful she states states he is getting worn out  She states her depression varies but she is not suicidal and would not harm anyone else  She has appropriate safety awareness and is clear and logical in her thought process  She admits to 'blowing up at things quickly' but also states she is able to calm down quickly too  Gegeerich Loza denies side effects of medications      Mental Status Evaluation:  Appearance:  Adequate hygiene and grooming and Good eye contact   Behavior:  cooperative   Fund of knowledge  vocabulary Average   Speech:   Language: Normal rate and Normal volume  No overt abnormality   Mood:  irritable initially but became euthymic through conversation   Affect:   Associations: appropriate  Tightly connected   Thought Process:  Goal directed and coherent   Thought Content:  Does not verbalize delusional material   Perceptual Disturbances: Denies hallucinations and does not appear to be responding to internal stimuli   Risk Potential: No suicidal or homicidal ideation   Orientation  Oriented x 3   Memory No deficits   Attention/Concentration attention span and concentration were age appropriate   Insight:  Good insight   Judgment: Good judgment   Gait/Station:  needs assistive device   Motor Activity: No abnormal movement noted     Vitals:    01/01/20 0948   BP:    Pulse:    Resp:    Temp:    SpO2: 98%     Progress Toward Goals: medication and treatment adherent    Assessment/Plan    Principal Problem:    Major depressive disorder, recurrent episode, moderate [facility])  Active Problems:    COPD exacerbation ([address] )    Essential hypertension    Acute low back pain    CAD (coronary artery disease), native coronary artery    Plan:  Continue q 7 minute safety checks  Encourage group and milieu therapy  Continue current medications  Discharge planning and disposition are going    Recommended Treatment: Continue with pharmacotherapy, group therapy, milieu therapy and occupational therapy    The patient will be maintained on the following medications:    Current Facility-Administered Medications:  acetaminophen 650 mg Oral Q6H PRN Iwonasnury Haas MD   acetaminophen 650 mg Oral Q4H PRN Iwonasnury Haas MD   acetaminophen 975 mg Oral Q6H PRN Navin Haas MD   albuterol 2 puff Inhalation Q6H PRN Arsalan Pelletier MD   aluminum-magnesium hydroxide-simethicone 30 mL Oral Q4H PRN Navin Haas MD   amLODIPine 5 mg Oral Daily Arsalan Pelletier MD   budesonide-formoterol 2 puff Inhalation BID Arsalan Pelletier MD   calcium carbonate 2 tablet Oral Daily With Breakfast Arsalan Pelletier MD   carvedilol 25 mg Oral BID With Meals Arsalan Pelletier MD   ciprofloxacin 250 mg Oral Q12H [address] Roderick Garcia PA-C   diphenhydrAMINE 50 mg Intramuscular Q6H PRN Ever Scales, PA-C   diphenhydrAMINE 50 mg Oral Q6H PRN Ever Scales, PA-C   escitalopram 10 mg Oral Daily Ca Sánchez MD   haloperidol 2 mg Oral Q8H PRN Navin Haas MD   haloperidol lactate 2 mg Intramuscular Q8H PRN Navin Haas MD   hydrochlorothiazide 25 mg Oral Daily Arsalan Pelletier MD   hydrOXYzine HCL 25 mg Oral Q4H PRN Ever Scales, PA-C   hydrOXYzine HCL 50 mg Oral Q6H PRN Ever Scales, PA-C   levalbuterol 1 25 mg Nebulization TID Arsalan Pelletier MD   And       sodium chloride 3 mL Nebulization TID Arsalan Pelletier MD   lisinopril 10 mg Oral Daily Roderick Garcia PA-C   loperamide 2 mg Oral TID PRN Arsalan Pelletier MD   magnesium hydroxide 30 mL Oral Daily PRN Navin Haas MD   melatonin 3 mg Oral HS PRN Nicolle Zapien PA-C   predniSONE 20 mg Oral Daily Migel Mcneill MD   traZODone 50 mg Oral HS PRN Navin Haas MD       Risks, benefits and possible side effects of Medications:   Risks, benefits, and possible side effects of medications explained to patient and patient verbalizes understanding        TAMMIE Osuna  1/1/2020

## 2020-01-01 NOTE — PROGRESS NOTES
Pt on continuous Nasal O2 on @ 4L/min via cannula  Lungs clear upon auscultation  Pt denies any cough  Pt c/o depression 5/10 & denies any anxiety  Pt denies any suicidal or homicidal ideations  Pt up ad isabel with steady gait  Q 7 min checks maintained to monitor pt's behavior & safety  Pt is pleasant & cooperative

## 2020-01-01 NOTE — NURSING NOTE
[phone]  Pt found to be sleeping on most of authors rounds  No acute behavioral issues noted  Q 7 min checks maintained  Fall protocol in place   02 running 4l

## 2020-01-01 NOTE — PROGRESS NOTES
[phone]  Patient visible out and about in the community this evening  She is pleasant, bright, and social with peers  She denies depression, anxiety, and suicidal thoughts  She is on 4L O2 continuous  She refused her Symbicort inhaler and stated she gets thrush every time she uses it  No complaints of SOB  She does report occasional lower back pain and received Tylenol at [address] and later reported relief  Will continue monitoring

## 2020-01-01 NOTE — PROGRESS NOTES
Pt's 2nd stool for occult blood is negative  Pt's MRSA swab is negative  Q 7 min checks maintained to monitor pt's behavior & safety

## 2020-01-02 LAB
ANION GAP SERPL CALCULATED.3IONS-SCNC: 7 MMOL/L (ref 4–13)
BUN SERPL-MCNC: 31 MG/DL (ref 7–25)
CALCIUM SERPL-MCNC: 9.5 MG/DL (ref 8.6–10.5)
CHLORIDE SERPL-SCNC: 106 MMOL/L (ref 98–107)
CO2 SERPL-SCNC: 29 MMOL/L (ref 21–31)
CREAT SERPL-MCNC: 0.97 MG/DL (ref 0.6–1.2)
GFR SERPL CREATININE-BSD FRML MDRD: 58 ML/MIN/1.73SQ M
GLUCOSE P FAST SERPL-MCNC: 87 MG/DL (ref 65–99)
GLUCOSE SERPL-MCNC: 87 MG/DL (ref 65–99)
POTASSIUM SERPL-SCNC: 3.7 MMOL/L (ref 3.5–5.5)
SODIUM SERPL-SCNC: 142 MMOL/L (ref 134–143)

## 2020-01-02 PROCEDURE — 80048 BASIC METABOLIC PNL TOTAL CA: CPT | Performed by: PHYSICIAN ASSISTANT

## 2020-01-02 PROCEDURE — 94640 AIRWAY INHALATION TREATMENT: CPT

## 2020-01-02 PROCEDURE — 94760 N-INVAS EAR/PLS OXIMETRY 1: CPT

## 2020-01-02 PROCEDURE — 99232 SBSQ HOSP IP/OBS MODERATE 35: CPT | Performed by: PHYSICIAN ASSISTANT

## 2020-01-02 RX ORDER — TRAMADOL HYDROCHLORIDE 50 MG/1
50 TABLET ORAL EVERY 6 HOURS PRN
Status: DISCONTINUED | OUTPATIENT
Start: 2020-01-02 | End: 2020-01-08 | Stop reason: HOSPADM

## 2020-01-02 RX ORDER — MELATONIN
1000 DAILY
Status: DISCONTINUED | OUTPATIENT
Start: 2020-01-02 | End: 2020-01-08 | Stop reason: HOSPADM

## 2020-01-02 RX ADMIN — HYDROCHLOROTHIAZIDE 25 MG: 12.5 TABLET ORAL at 08:23

## 2020-01-02 RX ADMIN — TRAMADOL HYDROCHLORIDE 50 MG: 50 TABLET, FILM COATED ORAL at 15:11

## 2020-01-02 RX ADMIN — ISODIUM CHLORIDE 3 ML: 0.03 SOLUTION RESPIRATORY (INHALATION) at 20:55

## 2020-01-02 RX ADMIN — VITAMIN D, TAB 1000IU (100/BT) 1000 UNITS: 25 TAB at 10:26

## 2020-01-02 RX ADMIN — ISODIUM CHLORIDE 3 ML: 0.03 SOLUTION RESPIRATORY (INHALATION) at 15:24

## 2020-01-02 RX ADMIN — TRAZODONE HYDROCHLORIDE 50 MG: 50 TABLET ORAL at 23:33

## 2020-01-02 RX ADMIN — CARVEDILOL 25 MG: 25 TABLET, FILM COATED ORAL at 08:21

## 2020-01-02 RX ADMIN — ESCITALOPRAM OXALATE 10 MG: 10 TABLET ORAL at 08:23

## 2020-01-02 RX ADMIN — LEVALBUTEROL HYDROCHLORIDE 1.25 MG: 1.25 SOLUTION, CONCENTRATE RESPIRATORY (INHALATION) at 20:55

## 2020-01-02 RX ADMIN — CIPROFLOXACIN HYDROCHLORIDE 250 MG: 250 TABLET, FILM COATED ORAL at 20:27

## 2020-01-02 RX ADMIN — CALCIUM 2 TABLET: 500 TABLET ORAL at 08:22

## 2020-01-02 RX ADMIN — MELATONIN 3 MG: at 00:14

## 2020-01-02 RX ADMIN — LEVALBUTEROL HYDROCHLORIDE 1.25 MG: 1.25 SOLUTION, CONCENTRATE RESPIRATORY (INHALATION) at 09:22

## 2020-01-02 RX ADMIN — LEVALBUTEROL HYDROCHLORIDE 1.25 MG: 1.25 SOLUTION, CONCENTRATE RESPIRATORY (INHALATION) at 15:23

## 2020-01-02 RX ADMIN — AMLODIPINE BESYLATE 5 MG: 5 TABLET ORAL at 08:22

## 2020-01-02 RX ADMIN — PREDNISONE 20 MG: 10 TABLET ORAL at 08:22

## 2020-01-02 RX ADMIN — ACETAMINOPHEN 975 MG: 325 TABLET ORAL at 08:26

## 2020-01-02 RX ADMIN — LISINOPRIL 10 MG: 10 TABLET ORAL at 08:22

## 2020-01-02 RX ADMIN — CIPROFLOXACIN HYDROCHLORIDE 250 MG: 250 TABLET, FILM COATED ORAL at 08:22

## 2020-01-02 RX ADMIN — ISODIUM CHLORIDE 3 ML: 0.03 SOLUTION RESPIRATORY (INHALATION) at 09:23

## 2020-01-02 NOTE — NURSING NOTE
Pt requested/ received prn trazodone; after reassessment reported ineffective; prn melatonin administered

## 2020-01-02 NOTE — PROGRESS NOTES
01/02/20 0947   Team Meeting   Meeting Type Daily Rounds   Team Members Present   Team Members Present Physician;Nurse;;; Occupational Therapist   Physician Team Member Dr Marysue Ahumada, MD; Xin Acosta PA-C   Nursing Team Member Stanley Hale    Care Management Team Member Katya Sebastian MS, [facility]   Social Work Team Member Meka Jolley [facility]    OT Team Member Nannette Vinson [place]    Patient/Family Present   Patient Present No   Patient's Family Present No     O2; pleasant, cooperative; prn trazodone and melatonin; c/o pain; MRSA -

## 2020-01-02 NOTE — PROGRESS NOTES
Progress Note - [address] 69 y o  female MRN: [id number]   Unit/Bed#: Mouna Gonzalez 202-02 Encounter: [id number]    Behavior over the last 24 hours: john Chatterjee was seen today for continuation of care and case was reviewed with treatment team   Per staff, patient remains pleasant and cooperative  Had poor sleep last night and complaining of chronic pain issues  Today patient is pleasant, cooperative, and calm upon approach  She reports I am feeling okay    She expresses her main concern is the fact that she has had 2 nights of poor sleep with little help from the p r n  Insomnia meds  She reports she is unsure why she is having poor sleep  Did discuss that could be due to a different environment and not being in her own bed  However, overall she feels depression is still decreasing and denies any anxiety  Denies SI/HI and does report she is happy that her appetite has greatly improved she reports she was not eating had all at home and feels with worsening depression at home she was only having 1 cup of tea per day  Continues to provide insight into triggers for her depression and feels that speaking with others on the unit and group therapy has been helpful with coping with her depression      Sleep: decreased  Appetite: improved  Medication side effects: No   ROS: reports tiredness and myalgias, all other systems are negative    Mental Status Evaluation:    Appearance:  casually dressed, adequate grooming, marginal hygiene   Behavior:  pleasant, cooperative, calm, good eye contact   Speech:  normal rate, normal volume, normal pitch   Mood:  improving   Affect:  brighter, mild irritablilty related to not sleeping   Thought Process:  coherent, goal directed   Associations: intact associations   Thought Content:  no overt delusions   Perceptual Disturbances: no auditory hallucinations, no visual hallucinations, does not appear responding to internal stimuli   Risk Potential: Suicidal ideation - None  Homicidal ideation - None  Potential for aggression - No   Sensorium:  oriented to person, place and time/date   Memory:  recent and remote memory grossly intact   Consciousness:  alert and awake   Attention: attention span and concentration are age appropriate   Insight:  fair   Judgment: fair   Gait/Station: uses walker   Motor Activity: no abnormal movements     Vital signs in last 24 hours:    Temp:  [97 5 °F (36 4 °C)-98 2 °F (36 8 °C)] 98 2 °F (36 8 °C)  HR:  [] 74  Resp:  [18-20] 18  BP: ()/(52-57) 115/52    Laboratory results:    I have personally reviewed all pertinent laboratory/tests results  Most Recent Labs:   Lab Results   Component Value Date    WBC 10 95 (H) 12/28/2019    RBC 3 52 (L) 12/28/2019    HGB 10 6 (L) 12/28/2019    HCT 33 5 (L) 12/28/2019     12/28/2019    RDW 13 7 12/28/2019    NEUTROABS 10 15 (H) 04/17/2019    SODIUM 142 01/02/2020    K 3 7 01/02/2020     01/02/2020    CO2 29 01/02/2020    BUN 31 (H) 01/02/2020    CREATININE 0 97 01/02/2020    GLUC 87 01/02/2020    GLUF 87 01/02/2020    CALCIUM 9 5 01/02/2020    AST 9 (L) 12/30/2019    ALT 4 (L) 12/30/2019    ALKPHOS 73 12/30/2019    TP 7 4 12/30/2019    ALB 4 0 12/30/2019    TBILI 0 30 12/30/2019    [id number] 0 841 12/28/2019       Progress Toward Goals: making gradual progress    Assessment/Plan   Principal Problem:    Major depressive disorder, recurrent episode, moderate (HCC)  Active Problems:    COPD exacerbation (HCC)    Essential hypertension    Acute low back pain    CAD (coronary artery disease), native coronary artery    Recommended Treatment:     Planned medication and treatment changes:     All current active medications have been reviewed  Encourage group therapy, milieu therapy and occupational therapy  Behavioral Health checks every 7 minutes  Continue all medications as prescribed      Current Facility-Administered Medications:  acetaminophen 650 mg Oral Q6H PRN Navin Haas MD   acetaminophen 650 mg Oral Q4H PRN Navin Haas MD   albuterol 2 puff Inhalation Q6H PRN Juwan Oquendo MD   aluminum-magnesium hydroxide-simethicone 30 mL Oral Q4H PRN Navin Haas MD   amLODIPine 5 mg Oral Daily Juwan Oquendo MD   budesonide-formoterol 2 puff Inhalation BID Juwan Oquendo MD   calcium carbonate 2 tablet Oral Daily With Breakfast Juwan Oquendo MD   carvedilol 25 mg Oral BID With Meals Juwan Oquendo MD   cholecalciferol 1,000 Units Oral Daily Aubrie Garcia PA-C   ciprofloxacin 250 mg Oral Q12H [facility] Roderick Garcia PA-C   diphenhydrAMINE 50 mg Intramuscular Q6H PRN Felicita Garcia PA-C   diphenhydrAMINE 50 mg Oral Q6H PRN Felicita Garcia PA-C   escitalopram 10 mg Oral Daily Jayne Simms MD   haloperidol 2 mg Oral Q8H PRN Navin Haas MD   haloperidol lactate 2 mg Intramuscular Q8H PRN Navin Haas MD   hydrochlorothiazide 25 mg Oral Daily Juwan Oquendo MD   hydrOXYzine HCL 25 mg Oral Q4H PRN Felicita Garcia PA-C   hydrOXYzine HCL 50 mg Oral Q6H PRN Felicita Garcia PA-C   levalbuterol 1 25 mg Nebulization TID Juwan Oquendo MD   And       sodium chloride 3 mL Nebulization TID Juwan Oquendo MD   lisinopril 10 mg Oral Daily Sammy Alatorre PA-C   loperamide 2 mg Oral TID PRN Juwan Oquendo MD   magnesium hydroxide 30 mL Oral Daily PRN Navin Haas MD   melatonin 3 mg Oral HS PRN Felicita Garcia PA-C   predniSONE 20 mg Oral Daily Juwan Oquendo MD   traMADol 50 mg Oral Q6H PRN Aubrie Garcia PA-C   traZODone 50 mg Oral HS PRN Navin Haas MD       Risks / Benefits of Treatment:    Risks, benefits, and possible side effects of medications explained to patient and patient verbalizes understanding and agreement for treatment  Counseling / Coordination of Care: Total floor / unit time spent today 15 minutes   Greater than 50% of total time was spent with the patient and / or family counseling and / or coordination of care  A description of counseling / coordination of care:  Patient's progress discussed with staff in treatment team meeting  Medications, treatment progress and treatment plan reviewed with patient  Coping with chronic pain and Depression reviewed with patient  Importance of medication and treatment compliance reviewed with patient  Supportive counseling provided to the patient

## 2020-01-02 NOTE — PLAN OF CARE
Problem: Ineffective Coping  Goal: Identifies ineffective coping skills  Outcome: Progressing  Goal: Identifies healthy coping skills  Outcome: Progressing  Goal: Demonstrates healthy coping skills  Outcome: Progressing  Goal: Participates in unit activities  Description  Interventions:  - Provide therapeutic environment   - Provide required programming   - Redirect inappropriate behaviors   Outcome: Progressing  Goal: Patient/Family participate in treatment and DC plans  Description  Interventions:  - Provide therapeutic environment  Outcome: Progressing  Goal: Patient/Family verbalizes awareness of resources  Outcome: Progressing  Goal: Understands least restrictive measures  Description  Interventions:  - Utilize least restrictive behavior  Outcome: Progressing  Goal: Free from restraint events  Description  - Utilize least restrictive measures   - Provide behavioral interventions   - Redirect inappropriate behaviors   Outcome: Progressing     Problem: Risk for Self Injury/Neglect  Goal: Treatment Goal: Remain safe during length of stay, learn and adopt new coping skills, and be free of self-injurious ideation, impulses and acts at the time of discharge  Outcome: Progressing  Goal: Verbalize thoughts and feelings  Description  Interventions:  - Assess and re-assess patient's lethality and potential for self-injury  - Engage patient in 1:1 interactions, daily, for a minimum of 15 minutes  - Encourage patient to express feelings, fears, frustrations, hopes  - Establish rapport/trust with patient   Outcome: Progressing  Goal: Refrain from harming self  Description  Interventions:  - Monitor patient closely, per order  - Develop a trusting relationship  - Supervise medication ingestion, monitor effects and side effects   Outcome: Progressing     Problem: Depression  Goal: Treatment Goal: Demonstrate behavioral control of depressive symptoms, verbalize feelings of improved mood/affect, and adopt new coping skills prior to discharge  Outcome: Progressing  Goal: Verbalize thoughts and feelings  Description  Interventions:  - Assess and re-assess patient's level of risk   - Engage patient in 1:1 interactions, daily, for a minimum of 15 minutes   - Encourage patient to express feelings, fears, frustrations, hopes   Outcome: Progressing  Goal: Refrain from isolation  Description  Interventions:  - Develop a trusting relationship   - Encourage socialization   Outcome: Progressing  Goal: Refrain from self-neglect  Outcome: Progressing     Problem: Anxiety  Goal: Anxiety is at manageable level  Description  Interventions:  - Assess and monitor patient's anxiety level  - Monitor for signs and symptoms (heart palpitations, chest pain, shortness of breath, headaches, nausea, feeling jumpy, restlessness, irritable, apprehensive)  - Collaborate with interdisciplinary team and initiate plan and interventions as ordered    - [place] patient to unit/surroundings  - Explain treatment plan  - Encourage participation in care  - Encourage verbalization of concerns/fears  - Identify coping mechanisms  - Assist in developing anxiety-reducing skills  - Administer/offer alternative therapies  - Limit or eliminate stimulants  Outcome: Progressing     Problem: Ineffective Coping  Goal: Participates in unit activities  Description  Interventions:  - Provide therapeutic environment   - Provide required programming   - Redirect inappropriate behaviors   Outcome: Progressing

## 2020-01-02 NOTE — PLAN OF CARE
Problem: Ineffective Coping  Goal: Participates in unit activities  Description  Interventions:  - Provide therapeutic environment   - Provide required programming   - Redirect inappropriate behaviors   Outcome: Progressing   Patient joins groups daily; she is very social   She was more positive and interactive in afternoon group today  She was smiling and joking as well as encouraging to peers

## 2020-01-02 NOTE — NURSING NOTE
[id number]     Pt present in the milieu; Affect bright on approach  Mood stable; continues to maintain gains  Q 7 min checks ongoing

## 2020-01-02 NOTE — PROGRESS NOTES
Jack Cueto  WHS:[id number] F  LTW:[id number]    BAX:[id number]  Adm Date: 12/28/2019 1548  3:48 PM   ATT PHY: Angela Gan, [address]         Subjective     The patient was seen after reviewing the chart and discussing the case with caring staff  Today during our encounter, the patient reported worsening in her sciatica  She complains of low back pain radiating to the left posterior leg, extending down to the posterior knee  Tylenol is not helping the pain  She previously received hydrocodone from her PCP with relief  Notable labs include low vitamin D level at 28 3 and improvement in her renal function (creatinine from 1 40 to 0 97 and GFR from 37 to 58)  Objective     Vitals:    01/02/20 0923   BP:    Pulse:    Resp:    Temp:    SpO2: 98%       General Appearance: Awake and Alert  No acute distress  HEENT: Normocephalic, atraumatic  PERRLA, EOMI, MMM  Heart: RRR, no murmurs  Normal S1 and S2   Lungs: CTA bilaterally with fair air entry  Assessment     Gustavo Sommer is a(n) 69y o  year old female with MDD      1  Cardiac with history of Hypertension, Hyperlipidemia, and CAD s/p MI  Amlodipine 5mg, Coreg 25mg BID, HCTZ 25mg, and lisinopril 10mg  2  COPD  Symbicort, Xopenex, PRN albuterol, prednisone 20mg, supplemental oxygen  3  DARLING  Much improved  Caution NSAID's   4  UTI  Cipro 250mg every 12 hours, day #5/5   5  Weight loss  Ensure TID  6  DJD/OA  Tylenol as needed  For severe pain, I have add Tramadol 50mg every 6 hours as needed  7  Diarrhea  Imodium as needed  8  Vitamin D Deficiency  Vitamin D3 1000U daily  The patient was discussed with Dr Yazmin García and he is in agreement with the above note

## 2020-01-02 NOTE — PROGRESS NOTES
Patient visible in milieu, pleasant and cooperative in interaction, irritable edge due to pain, patient spoke with Aneesh Esquivel PA-C in regards to pain control, PRN Tylenol given as ordered  Patient appeared anxious but stated, "I'm OK"  Patient denies SI/HI, hallucinations  Remains medication compliant and on 7" checks for safety and behaviors

## 2020-01-02 NOTE — SOCIAL WORK
SW met with patient in pt room; pt flat, depressed, smiles with contact; Pt denies SI/HI/AH/VH, dep "it's ok", denies anxiety; Pt requested assistance from SW in obtaining waiver services in the home - SW agreeable to making the referral;  Pt states that she applied for shared ride but was denied due to O2 use - pt questioned "how does someone with oxygen get to dr's appts then when they don't drive?" - SW agreeable to speaking with aging office to obtain answers for patient; Pt agreeable to [facility] including RN, PT/OT services upon discharge - hx with [facility] "but they were bought out by someone" - PABLO will print list of [facility] providers in pt home area and allow pt to choose provider;   No additional questions or concerns for PABLO at this time

## 2020-01-02 NOTE — NURSING NOTE
Prn appears effective; No acute behavioral issues noted 02 maintained on 4L ongoing monitoring will continue

## 2020-01-03 PROCEDURE — 99232 SBSQ HOSP IP/OBS MODERATE 35: CPT | Performed by: PHYSICIAN ASSISTANT

## 2020-01-03 PROCEDURE — 94640 AIRWAY INHALATION TREATMENT: CPT

## 2020-01-03 PROCEDURE — 94760 N-INVAS EAR/PLS OXIMETRY 1: CPT

## 2020-01-03 RX ORDER — TRAZODONE HYDROCHLORIDE 50 MG/1
50 TABLET ORAL
Status: DISCONTINUED | OUTPATIENT
Start: 2020-01-03 | End: 2020-01-08 | Stop reason: HOSPADM

## 2020-01-03 RX ADMIN — VITAMIN D, TAB 1000IU (100/BT) 1000 UNITS: 25 TAB at 08:11

## 2020-01-03 RX ADMIN — TRAZODONE HYDROCHLORIDE 50 MG: 50 TABLET ORAL at 21:20

## 2020-01-03 RX ADMIN — TRAMADOL HYDROCHLORIDE 50 MG: 50 TABLET, FILM COATED ORAL at 08:17

## 2020-01-03 RX ADMIN — CARVEDILOL 25 MG: 25 TABLET, FILM COATED ORAL at 08:12

## 2020-01-03 RX ADMIN — PREDNISONE 20 MG: 10 TABLET ORAL at 08:11

## 2020-01-03 RX ADMIN — MELATONIN 3 MG: at 22:57

## 2020-01-03 RX ADMIN — TRAMADOL HYDROCHLORIDE 50 MG: 50 TABLET, FILM COATED ORAL at 19:10

## 2020-01-03 RX ADMIN — ISODIUM CHLORIDE 3 ML: 0.03 SOLUTION RESPIRATORY (INHALATION) at 23:34

## 2020-01-03 RX ADMIN — CARVEDILOL 25 MG: 25 TABLET, FILM COATED ORAL at 17:27

## 2020-01-03 RX ADMIN — LEVALBUTEROL HYDROCHLORIDE 1.25 MG: 1.25 SOLUTION, CONCENTRATE RESPIRATORY (INHALATION) at 23:33

## 2020-01-03 RX ADMIN — ESCITALOPRAM OXALATE 10 MG: 10 TABLET ORAL at 08:12

## 2020-01-03 RX ADMIN — AMLODIPINE BESYLATE 5 MG: 5 TABLET ORAL at 08:11

## 2020-01-03 RX ADMIN — CIPROFLOXACIN HYDROCHLORIDE 250 MG: 250 TABLET, FILM COATED ORAL at 08:12

## 2020-01-03 RX ADMIN — ISODIUM CHLORIDE 3 ML: 0.03 SOLUTION RESPIRATORY (INHALATION) at 08:48

## 2020-01-03 RX ADMIN — HYDROCHLOROTHIAZIDE 25 MG: 12.5 TABLET ORAL at 08:11

## 2020-01-03 RX ADMIN — ISODIUM CHLORIDE 3 ML: 0.03 SOLUTION RESPIRATORY (INHALATION) at 16:02

## 2020-01-03 RX ADMIN — LEVALBUTEROL HYDROCHLORIDE 1.25 MG: 1.25 SOLUTION, CONCENTRATE RESPIRATORY (INHALATION) at 08:48

## 2020-01-03 RX ADMIN — LOPERAMIDE HYDROCHLORIDE 2 MG: 2 CAPSULE ORAL at 12:12

## 2020-01-03 RX ADMIN — LEVALBUTEROL HYDROCHLORIDE 1.25 MG: 1.25 SOLUTION, CONCENTRATE RESPIRATORY (INHALATION) at 16:02

## 2020-01-03 RX ADMIN — LISINOPRIL 10 MG: 10 TABLET ORAL at 08:12

## 2020-01-03 RX ADMIN — CALCIUM 2 TABLET: 500 TABLET ORAL at 08:12

## 2020-01-03 NOTE — PROGRESS NOTES
Progress Note - [address] 69 y o  female MRN: [id number]   Unit/Bed#: Althea Fierro 202-02 Encounter: [id number]    Behavior over the last 24 hours: john Peguero was seen today for continuation of care and case was reviewed with treatment team   Per staff, patient remains pleasant and cooperative and continues to ask for p r n  Trazodone for insomnia  Today upon approach patient is pleasant, cooperative, calm  She reports I am feeling good today because I had better sleep last night    She reports she had better sleep and fell asleep right away so she is feeling more energized today  Currently admits to minimal depression  Denies SI/HI  Does not endorse psychosis  Continues to provide insight into triggers for her depression      Sleep: slept better  Appetite: improved  Medication side effects: No   ROS: reports Arthralgias, myalgias, all other systems are negative    Mental Status Evaluation:    Appearance:  casually dressed, dressed appropriately, marginal hygiene   Behavior:  pleasant, cooperative, calm, good eye contact   Speech:  normal rate, normal volume, normal pitch   Mood:  normal   Affect:  normal range and intensity, appropriate   Thought Process:  coherent, goal directed   Associations: intact associations   Thought Content:  no overt delusions   Perceptual Disturbances: no auditory hallucinations, no visual hallucinations, does not appear responding to internal stimuli   Risk Potential: Suicidal ideation - None  Homicidal ideation - None  Potential for aggression - No   Sensorium:  oriented to person, place, time/date and situation   Memory:  recent and remote memory grossly intact   Consciousness:  alert and awake   Attention: attention span and concentration are age appropriate   Insight:  good   Judgment: fair   Gait/Station: slow gait   Motor Activity: no abnormal movements     Vital signs in last 24 hours:    Temp:  [97 7 °F (36 5 °C)-98 7 °F (37 1 °C)] 97 7 °F (36 5 °C)  HR:  [79-96] 79  Resp:  [18] 18  BP: (102-118)/(53-58) 118/58    Laboratory results:    I have personally reviewed all pertinent laboratory/tests results  Most Recent Labs:   Lab Results   Component Value Date    WBC 10 95 (H) 12/28/2019    RBC 3 52 (L) 12/28/2019    HGB 10 6 (L) 12/28/2019    HCT 33 5 (L) 12/28/2019     12/28/2019    RDW 13 7 12/28/2019    NEUTROABS 10 15 (H) 04/17/2019    SODIUM 142 01/02/2020    K 3 7 01/02/2020     01/02/2020    CO2 29 01/02/2020    BUN 31 (H) 01/02/2020    CREATININE 0 97 01/02/2020    GLUC 87 01/02/2020    GLUF 87 01/02/2020    CALCIUM 9 5 01/02/2020    AST 9 (L) 12/30/2019    ALT 4 (L) 12/30/2019    ALKPHOS 73 12/30/2019    TP 7 4 12/30/2019    ALB 4 0 12/30/2019    TBILI 0 30 12/30/2019    [id number] 0 841 12/28/2019       Progress Toward Goals: progressing    Assessment/Plan   Principal Problem:    Major depressive disorder, recurrent episode, moderate (HCC)  Active Problems:    COPD exacerbation (HCC)    Essential hypertension    Acute low back pain    CAD (coronary artery disease), native coronary artery    Recommended Treatment:     Planned medication and treatment changes: All current active medications have been reviewed  Encourage group therapy, milieu therapy and occupational therapy  Behavioral Health checks every 7 minutes  Change p r n  Trazodone to scheduled trazodone 50 mg q h s    Continue all other medications as prescribed      Current Facility-Administered Medications:  acetaminophen 650 mg Oral Q6H PRN Iwonasnury Haas MD   acetaminophen 650 mg Oral Q4H PRN Iwonasnury Haas MD   albuterol 2 puff Inhalation Q6H PRN Ángel Plata MD   aluminum-magnesium hydroxide-simethicone 30 mL Oral Q4H PRN Iwonasnury Haas MD   amLODIPine 5 mg Oral Daily Ángel Plata MD   budesonide-formoterol 2 puff Inhalation BID Ángel Plata MD   calcium carbonate 2 tablet Oral Daily With Breakfast Ángel Plata MD   carvedilol 25 mg Oral BID With Meals Curtis Lopes MD   cholecalciferol 1,000 Units Oral Daily Rosario Garcia PA-C   diphenhydrAMINE 50 mg Intramuscular Q6H PRN Umana Emirea, PA-CASSIUS   diphenhydrAMINE 50 mg Oral Q6H PRN Lyndsay Selby, ANGÉLICA   escitalopram 10 mg Oral Daily Vibha Shaikh MD   haloperidol 2 mg Oral Q8H PRN Navin Haas MD   haloperidol lactate 2 mg Intramuscular Q8H PRN Navin Haas MD   hydrochlorothiazide 25 mg Oral Daily Curtis Lopes MD   hydrOXYzine HCL 25 mg Oral Q4H PRN Lyndsay Selby, ANGÉLICA   hydrOXYzine HCL 50 mg Oral Q6H PRN Lyndsay Selby PA-C   levalbuterol 1 25 mg Nebulization TID Curtis Lopes MD   And       sodium chloride 3 mL Nebulization TID Curtis Lopes MD   lisinopril 10 mg Oral Daily Meir Norton PA-C   loperamide 2 mg Oral TID PRN Curtis Lopes MD   magnesium hydroxide 30 mL Oral Daily PRN Navin Haas MD   melatonin 3 mg Oral HS PRN Lyndsay Selby PA-C   predniSONE 20 mg Oral Daily Curtis Lopes MD   traMADol 50 mg Oral Q6H PRN Meir Norton PA-C   traZODone 50 mg Oral HS Vibha Shaikh MD       Risks / Benefits of Treatment:    Risks, benefits, and possible side effects of medications explained to patient and patient verbalizes understanding and agreement for treatment  Counseling / Coordination of Care: Total floor / unit time spent today 15 minutes  Greater than 50% of total time was spent with the patient and / or family counseling and / or coordination of care  A description of counseling / coordination of care:  Patient's progress discussed with staff in treatment team meeting  Medications, treatment progress and treatment plan reviewed with patient  Medication changes discussed with patient  Supportive counseling provided to the patient

## 2020-01-03 NOTE — PROGRESS NOTES
Patient complained of restlessness and unable to fall asleep  Gave PRN 50 mg Trazodone per patient's request at 3166  Will continue to monitor safety and behaviors every 7 minutes

## 2020-01-03 NOTE — PROGRESS NOTES
[id number]    Patient was observed to be in the community today, attending groups, meals and snack time  No acute behaviors observed  Patient denies any anxiety and depression, denies SI, HI and hallucinations  Held 1630 Coreg d/t /53  Patient refused scheduled Symbicort, informing "Even with a lot of rinsing, I still get thrush  I'm not taking it "  She received one PRN dose of Ultra at 1511 for 8/10 left sciatica pain  On reassessment, she informs the pain medication was effective as it reduced her pain to 4/10 and is tolerable now  Oxygen maintained at 4L via NC; respirations are easy and non-labored  Will CTM via q7 minute safety checks

## 2020-01-03 NOTE — PLAN OF CARE
Problem: Ineffective Coping  Goal: Identifies ineffective coping skills  Outcome: Progressing  Goal: Identifies healthy coping skills  Outcome: Progressing  Goal: Demonstrates healthy coping skills  Outcome: Progressing  Goal: Participates in unit activities  Description  Interventions:  - Provide therapeutic environment   - Provide required programming   - Redirect inappropriate behaviors   Outcome: Progressing  Goal: Patient/Family participate in treatment and DC plans  Description  Interventions:  - Provide therapeutic environment  Outcome: Progressing  Goal: Patient/Family verbalizes awareness of resources  Outcome: Progressing  Goal: Understands least restrictive measures  Description  Interventions:  - Utilize least restrictive behavior  Outcome: Progressing  Goal: Free from restraint events  Description  - Utilize least restrictive measures   - Provide behavioral interventions   - Redirect inappropriate behaviors   Outcome: Progressing

## 2020-01-03 NOTE — PROGRESS NOTES
01/03/20 0942   Team Meeting   Meeting Type Daily Rounds   Team Members Present   Team Members Present Physician;Nurse;;; Occupational Therapist   Physician Team Member Dr Alexus Crane MD; Katya Urena PA-C   Nursing Team Member Fransico Marie, RN   Care Management Team Member Sam Lee MS, [facility]   Social Work Team Member Mode Argueta [facility]   OT Team Member Adi Blue [place]   Patient/Family Present   Patient Present No   Patient's Family Present No      PT took Prn trazodone last night  PT on ultram for sciatic pain  No D/C date at this time  Will make referral for waiver program  PT to D/C next week Wednesday or Thursday

## 2020-01-03 NOTE — PROGRESS NOTES
Maintain 4L O2 via nasal canula  No SOB or distress noted during 7 minute safety checks  Patient slept without difficulty after PRN Trazodone was administered  No behavioral issues  Will continue to monitor safety and behaviors every 7 minutes

## 2020-01-03 NOTE — PROGRESS NOTES
Patient compliant with morning medications and meals  Pleasant and cooperative  Patient denies any problems this am, stated she is depressed "because she is still here" denies any SI/HI  Patient denies any anxiety  Pain medication given with morning medications  Patient requested imodium for irritable bowel  She is currently in group  No other concerns or problems reported  Q 7 mins checks in place for safety  Will continue to monitor for behaviors and changes

## 2020-01-03 NOTE — PROGRESS NOTES
Rosalinda Guzman  XED:[birth year] F  QMU:[id number]    BOW:[id number]  Adm Date: 12/28/2019 1548  3:48 PM   ATT PHY: Kathy Lopes, [address]         Subjective     The patient was seen after reviewing the chart and discussing the case with caring staff  Today during our encounter, the patient reported no acute concerns  She reports relief from Tramadol 50mg for the low back pain/sciatica  Objective     Vitals:    01/03/20 0848   BP:    Pulse:    Resp:    Temp:    SpO2: 98%       General Appearance: Awake and Alert  No acute distress  HEENT: Normocephalic, atraumatic  PERRLA, EOMI, MMM  Heart: RRR, no murmurs  Normal S1 and S2   Lungs: CTA bilaterally with fair air entry  Assessment     Margaret Buenrostro is a(n) 69y o  year old female with MDD      1  Cardiac with history of Hypertension, Hyperlipidemia, and CAD s/p MI  Amlodipine 5mg, Coreg 25mg BID, HCTZ 25mg, and lisinopril 10mg  2  COPD  Symbicort, Xopenex, PRN albuterol, prednisone 20mg, supplemental oxygen  3  DARLING  Much improved  Caution NSAID's   4  Weight loss  Ensure TID  5  DJD/OA  Tylenol as needed for mild-moderate pain and Tramadol 50mg every 6 hours as needed for severe pain  6  Diarrhea  Imodium as needed  7  Vitamin D Deficiency  Vitamin D3 1000U daily  The patient was discussed with Dr Valerie Martinez and he is in agreement with the above note

## 2020-01-03 NOTE — SOCIAL WORK
SW met with patient to discuss discharge planning;  D/c scheduled for Weds 1/8 pending no changes prior; pt declined meals on wheels stating "they taste gross"; Pt remains concerned about getting to 's appts as she did not qualify for shared ride due to O2 - sw will contact [facility] office to discuss ([phone]); Pt continues to be agreeable to Waiver program referral - SW will make referral to [facility] ([facility]) prior to discharge;   Pt will require transport via BLS due to O2 - SW will arrange

## 2020-01-04 PROCEDURE — 99232 SBSQ HOSP IP/OBS MODERATE 35: CPT | Performed by: PSYCHIATRY & NEUROLOGY

## 2020-01-04 RX ADMIN — ESCITALOPRAM OXALATE 10 MG: 10 TABLET ORAL at 08:48

## 2020-01-04 RX ADMIN — PREDNISONE 20 MG: 10 TABLET ORAL at 08:49

## 2020-01-04 RX ADMIN — MELATONIN 3 MG: at 22:41

## 2020-01-04 RX ADMIN — LISINOPRIL 10 MG: 10 TABLET ORAL at 08:49

## 2020-01-04 RX ADMIN — CALCIUM 2 TABLET: 500 TABLET ORAL at 08:48

## 2020-01-04 RX ADMIN — HYDROCHLOROTHIAZIDE 25 MG: 12.5 TABLET ORAL at 08:49

## 2020-01-04 RX ADMIN — CARVEDILOL 25 MG: 25 TABLET, FILM COATED ORAL at 08:49

## 2020-01-04 RX ADMIN — TRAZODONE HYDROCHLORIDE 50 MG: 50 TABLET ORAL at 21:13

## 2020-01-04 RX ADMIN — TRAMADOL HYDROCHLORIDE 50 MG: 50 TABLET, FILM COATED ORAL at 09:19

## 2020-01-04 RX ADMIN — VITAMIN D, TAB 1000IU (100/BT) 1000 UNITS: 25 TAB at 08:49

## 2020-01-04 RX ADMIN — AMLODIPINE BESYLATE 5 MG: 5 TABLET ORAL at 08:48

## 2020-01-04 NOTE — PROGRESS NOTES
Patient out, watching TV in the small dinning room  O2 N/C in place  Patient pleasant and cooperative  Patient refused 1800 symbicort  No behaviors noted this shift  Patient pleasant and cooperative  Q 7 mins checks in place for safety  Will continue to monitor for behaviors and changes

## 2020-01-04 NOTE — RESPIRATORY THERAPY NOTE
RT Protocol Note  Spenser Parra 69 y o  female MRN: [id number]  Unit/Bed#: [facility]  Encounter: [id number]    Assessment    Principal Problem:    Major depressive disorder, recurrent episode, moderate ([place] [address] )  Active Problems:    COPD exacerbation ([place] [address] )    Essential hypertension    Acute low back pain    CAD (coronary artery disease), native coronary artery        Home Devices/Therapy: Home O2(albuterol MDI Q6prn/whz, Symbicort, 02 4lpm )    Past Medical History:   Diagnosis Date    Anxiety     Cardiac disease     Chronic pain disorder     COPD (chronic obstructive pulmonary disease) ([place] [address] )     Depression     Heart disease     Hyperlipidemia     Hypertension     MI (myocardial infarction) ([place] [address] )     MRSA (methicillin resistant Staphylococcus aureus)     Renal disorder     benign kidney tumor     Social History     Socioeconomic History    Marital status:      Spouse name: None    Number of children: 3    Years of education: 15    Highest education level: High school graduate   Occupational History    Occupation: No [employer]     Employer: [employer]     Comment: retired    Social Needs    Financial resource strain: Somewhat hard   [place] insecurity:     Worry: Sometimes true     Inability: Sometimes true    Transportation needs:     Medical: Yes     Non-medical: Yes   Tobacco Use    Smoking status: Former Smoker     Packs/day: 0 00     Years: 0 00     Pack years: 0 00     Types: Cigarettes     Last attempt to quit: 2018     Years since quittin 1    Smokeless tobacco: Never Used   Substance and Sexual Activity    Alcohol use: Never     Frequency: Never    Drug use: No    Sexual activity: Not Currently   Lifestyle    Physical activity:     Days per week: 0 days     Minutes per session: 0 min    Stress:  Only a little   Relationships    Social connections:     Talks on phone: Twice a week     Gets together: Once a week     Attends Yazdanism service: Never Active member of club or organization: No     Attends meetings of clubs or organizations: Never     Relationship status:     Intimate partner violence:     Fear of current or ex partner: No     Emotionally abused: No     Physically abused: No     Forced sexual activity: No   Other Topics Concern    None   Social History Narrative    None       Subjective         Objective    Physical Exam:        Vitals:  Blood pressure 110/57, pulse 79, temperature 98 °F (36 7 °C), temperature source Temporal, resp  rate 18, height 5' 7" (1 702 m), weight 53 2 kg (117 lb 4 6 oz), SpO2 98 %, not currently breastfeeding  Imaging and other studies: I have personally reviewed pertinent reports              Plan    Respiratory Plan: No distress/Pulmonary history(Hx : COPD,resp  failure and pneumonia)        Resp Comments: Post treatment evaluation    Pt will use Albuteral MDII as PRN Q4 hrs, no respiratory symptoms present at this time will monitor

## 2020-01-04 NOTE — PROGRESS NOTES
Patient compliant with medications and meals  Pleasant and cooperative  Requested ultram with morning medications for 8 out of 10 left leg pain  Patient denies any anxiety and stated her depression is "ok"  No concerns or problems reported this shift  Q 7 mins checks in place for safety  Will continue to monitor for behaviors and changes

## 2020-01-04 NOTE — PROGRESS NOTES
Patient was able to sleep through the night after receiving PRN melatonin at 2257 per her request   No acute behaviors exhibited  Maintained oxygen at 4L via NC; breathing is easy and non-labored  No complaints voiced  Will CTM via q7 minute safety checks

## 2020-01-04 NOTE — PROGRESS NOTES
Contacted RT to follow up on outstanding neb treatment ordered  RT will be on the floor shortly to administer

## 2020-01-04 NOTE — PROGRESS NOTES
[phone]    Patient was present in the community this evening  She is social with peers  Patient is pleasant and cooperative with this writer at time of assessment  Patient denies anxiety, endorses depression, denies SI, HI and hallucinations  She received one PRN dose of Ultram at 1910 for 8/10 left sciatica aching which was reduced to a tolerable 4/10 per patient  Oxygen maintained at 4L via NC; breathing remains easy and non-labored  No acute behaviors observed  Will CTM via q7 minute safety checks

## 2020-01-04 NOTE — PLAN OF CARE
Problem: Ineffective Coping  Goal: Identifies ineffective coping skills  Outcome: Progressing  Goal: Identifies healthy coping skills  Outcome: Progressing  Goal: Demonstrates healthy coping skills  Outcome: Progressing  Goal: Participates in unit activities  Description  Interventions:  - Provide therapeutic environment   - Provide required programming   - Redirect inappropriate behaviors   Outcome: Progressing  Goal: Patient/Family participate in treatment and DC plans  Description  Interventions:  - Provide therapeutic environment  Outcome: Progressing  Goal: Patient/Family verbalizes awareness of resources  Outcome: Progressing  Goal: Understands least restrictive measures  Description  Interventions:  - Utilize least restrictive behavior  Outcome: Progressing  Goal: Free from restraint events  Description  - Utilize least restrictive measures   - Provide behavioral interventions   - Redirect inappropriate behaviors   Outcome: Progressing     Problem: Risk for Self Injury/Neglect  Goal: Treatment Goal: Remain safe during length of stay, learn and adopt new coping skills, and be free of self-injurious ideation, impulses and acts at the time of discharge  Outcome: Progressing  Goal: Verbalize thoughts and feelings  Description  Interventions:  - Assess and re-assess patient's lethality and potential for self-injury  - Engage patient in 1:1 interactions, daily, for a minimum of 15 minutes  - Encourage patient to express feelings, fears, frustrations, hopes  - Establish rapport/trust with patient   Outcome: Progressing  Goal: Refrain from harming self  Description  Interventions:  - Monitor patient closely, per order  - Develop a trusting relationship  - Supervise medication ingestion, monitor effects and side effects   Outcome: Progressing     Problem: Depression  Goal: Treatment Goal: Demonstrate behavioral control of depressive symptoms, verbalize feelings of improved mood/affect, and adopt new coping skills prior to discharge  Outcome: Progressing  Goal: Verbalize thoughts and feelings  Description  Interventions:  - Assess and re-assess patient's level of risk   - Engage patient in 1:1 interactions, daily, for a minimum of 15 minutes   - Encourage patient to express feelings, fears, frustrations, hopes   Outcome: Progressing  Goal: Refrain from isolation  Description  Interventions:  - Develop a trusting relationship   - Encourage socialization   Outcome: Progressing  Goal: Refrain from self-neglect  Outcome: Progressing     Problem: Anxiety  Goal: Anxiety is at manageable level  Description  Interventions:  - Assess and monitor patient's anxiety level  - Monitor for signs and symptoms (heart palpitations, chest pain, shortness of breath, headaches, nausea, feeling jumpy, restlessness, irritable, apprehensive)  - Collaborate with interdisciplinary team and initiate plan and interventions as ordered    - [place] patient to unit/surroundings  - Explain treatment plan  - Encourage participation in care  - Encourage verbalization of concerns/fears  - Identify coping mechanisms  - Assist in developing anxiety-reducing skills  - Administer/offer alternative therapies  - Limit or eliminate stimulants  Outcome: Progressing

## 2020-01-04 NOTE — PROGRESS NOTES
Patient received PRN melatonin for c/o insomnia  Administered medication as per order  Will monitor for medication effectiveness

## 2020-01-04 NOTE — PROGRESS NOTES
Progress Note - [address] 69 y o  female MRN: [id number]   Unit/Bed#: Dennis Theodore 202-02 Encounter: [id number]    Behavior over the last 24 hours: slowly improving  Boyd Osei was seen with her son today  She reports reduced depressed mood and anxiety  She also states she slept better with Trazodone last night in denies any side effect  Patient remains pleasant but states she has been trying to participate more in groups and activities in the unit  She denies any current suicide ideation or active plan  Patient has been compliant with medications and treatment goal in the unit  Sleep: slept better  Appetite: fair  Medication side effects: denies  ROS: no complaints, all other systems are negative    Mental Status Evaluation:    Appearance:  age appropriate   Behavior:  cooperative   Speech:  normal rate and volume   Mood:  depressed   Affect:  brighter   Thought Process:  goal directed   Associations: intact associations   Thought Content:  no overt delusions   Perceptual Disturbances: none   Risk Potential: Suicidal ideation - None at present  Homicidal ideation - None at present   Sensorium:  oriented to person, place and time/date   Memory:  recent and remote memory grossly intact   Consciousness:  alert and awake   Attention: attention span and concentration are age appropriate   Insight:  limited   Judgment: fair   Gait/Station: use walker   Motor Activity: no abnormal movements     Vital signs in last 24 hours:    Temp:  [97 5 °F (36 4 °C)-98 5 °F (36 9 °C)] 98 °F (36 7 °C)  HR:  [79-91] 79  Resp:  [18-22] 18  BP: (110-132)/(57-64) 110/57    Laboratory results: I have personally reviewed all pertinent laboratory/tests results      Assessment/Plan   Principal Problem:    Major depressive disorder, recurrent episode, moderate (HCC)  Active Problems:    COPD exacerbation ([facility])    Essential hypertension    Acute low back pain    CAD (coronary artery disease), native coronary artery    Recommended Treatment:     Planned medication and treatment changes: All current active medications have been reviewed  Encourage group therapy, milieu therapy and occupational therapy  Behavioral Health checks every 7 minutes   Ct with current meds regimen    Current Facility-Administered Medications:  acetaminophen 650 mg Oral Q6H PRN Navin Haas MD   acetaminophen 650 mg Oral Q4H PRN Navin Haas MD   albuterol 2 puff Inhalation Q6H PRN Curtis Lopes MD   aluminum-magnesium hydroxide-simethicone 30 mL Oral Q4H PRN Iwonasnury Haas MD   amLODIPine 5 mg Oral Daily Curtis Lopes MD   budesonide-formoterol 2 puff Inhalation BID Curtis Lopes MD   calcium carbonate 2 tablet Oral Daily With Breakfast Curtis Lopes MD   carvedilol 25 mg Oral BID With Meals Curtis Lopes MD   cholecalciferol 1,000 Units Oral Daily Rosario Garcia PA-C   diphenhydrAMINE 50 mg Intramuscular Q6H PRN Lyndsay Selby, ANGÉLICA   diphenhydrAMINE 50 mg Oral Q6H PRN Lyndsay Selby PA-C   escitalopram 10 mg Oral Daily Vibha Shaikh MD   haloperidol 2 mg Oral Q8H PRN Navin Haas MD   haloperidol lactate 2 mg Intramuscular Q8H PRN Navin Haas MD   hydrochlorothiazide 25 mg Oral Daily Curtis Lopes MD   hydrOXYzine HCL 25 mg Oral Q4H PRN Lyndsay Selby PA-C   hydrOXYzine HCL 50 mg Oral Q6H PRN Lyndsay Selby PA-C   lisinopril 10 mg Oral Daily Roderick Garcia PA-C   loperamide 2 mg Oral TID PRN Curtis Lopes MD   magnesium hydroxide 30 mL Oral Daily PRN Navin Haas MD   melatonin 3 mg Oral HS PRN Lyndsay Selby PA-C   predniSONE 20 mg Oral Daily Curtis Lopes MD   traMADol 50 mg Oral Q6H PRN Meir Norton PA-C   traZODone 50 mg Oral HS Vibha Shaikh MD       Risks / Benefits of Treatment:    Risks, benefits, and possible side effects of medications explained to patient and patient verbalizes understanding and agreement for treatment      Counseling / Coordination of Care:    Patient's progress discussed with staff in treatment team meeting  Medications, treatment progress and treatment plan reviewed with patient  Medication education provided to patient  D/C plan for next week      Noel Pepe MD 01/04/20

## 2020-01-04 NOTE — PROGRESS NOTES
Donna Mehta  RTU:[birth year] F  CCB:[id number]    XXI:[id number]  Adm Date: 12/28/2019 1548  3:48 PM   ATT PHY: Valeria Nicolas, [address]         Subjective     The patient was seen after reviewing the chart and discussing the case with caring staff  Today during our encounter, the patient reported no acute concerns  Objective     Vitals:    01/04/20 1522   BP: 107/56   Pulse: 78   Resp: 16   Temp: 97 5 °F (36 4 °C)   SpO2: 100%       General Appearance: Awake and Alert  No acute distress  HEENT: Normocephalic, atraumatic  PERRLA, EOMI, MMM  Heart: RRR, no murmurs  Normal S1 and S2   Lungs: CTA bilaterally with fair air entry  Assessment     Daquan Green is a(n) 69y o  year old female with MDD      1  Cardiac with history of Hypertension, Hyperlipidemia, and CAD s/p MI  Amlodipine 5mg, Coreg 25mg BID, HCTZ 25mg, and lisinopril 10mg  2  COPD  Symbicort, Xopenex, PRN albuterol, prednisone 20mg, supplemental oxygen  3  DARLING  Much improved  Caution NSAID's   4  Weight loss  Ensure TID  5  DJD/OA  Tylenol as needed for mild-moderate pain and Tramadol 50mg every 6 hours as needed for severe pain  6  Diarrhea  Imodium as needed  7  Vitamin D Deficiency  Vitamin D3 1000U daily  The patient was discussed with Dr Sheila Gibbons and he is in agreement with the above note

## 2020-01-05 PROCEDURE — 99232 SBSQ HOSP IP/OBS MODERATE 35: CPT | Performed by: PSYCHIATRY & NEUROLOGY

## 2020-01-05 RX ADMIN — HYDROCHLOROTHIAZIDE 25 MG: 12.5 TABLET ORAL at 09:02

## 2020-01-05 RX ADMIN — PREDNISONE 20 MG: 10 TABLET ORAL at 09:02

## 2020-01-05 RX ADMIN — CARVEDILOL 25 MG: 25 TABLET, FILM COATED ORAL at 16:33

## 2020-01-05 RX ADMIN — MELATONIN 3 MG: at 22:14

## 2020-01-05 RX ADMIN — TRAMADOL HYDROCHLORIDE 50 MG: 50 TABLET, FILM COATED ORAL at 09:03

## 2020-01-05 RX ADMIN — VITAMIN D, TAB 1000IU (100/BT) 1000 UNITS: 25 TAB at 09:04

## 2020-01-05 RX ADMIN — TRAZODONE HYDROCHLORIDE 50 MG: 50 TABLET ORAL at 21:34

## 2020-01-05 RX ADMIN — CARVEDILOL 25 MG: 25 TABLET, FILM COATED ORAL at 09:02

## 2020-01-05 RX ADMIN — ESCITALOPRAM OXALATE 10 MG: 10 TABLET ORAL at 09:02

## 2020-01-05 RX ADMIN — LISINOPRIL 10 MG: 10 TABLET ORAL at 09:02

## 2020-01-05 RX ADMIN — AMLODIPINE BESYLATE 5 MG: 5 TABLET ORAL at 09:02

## 2020-01-05 RX ADMIN — CALCIUM 2 TABLET: 500 TABLET ORAL at 09:03

## 2020-01-05 NOTE — CMS CERTIFICATION NOTE
Recertification: Based upon physical, mental and social evaluations, I certify that inpatient psychiatric services continue to be medically necessary for this patient for a duration of 10 midnights for the treatment of Major depressive disorder, recurrent episode, moderate ([facility] [address] )   Available alternative community resources still do not meet the patient's mental health care needs  I further attest that an established written individualized plan of care has been updated and is outlined in the patient's medical records

## 2020-01-05 NOTE — PROGRESS NOTES
Patient has been visible in milieu this morning for morning medications and breakfast  She is social with peers and staff  Oxygen intact at 4L/min NC with no respiratory distress noted  She is pleasant and cooperative  She complained of left leg and hip area pain #8 and requested and was given ultram at 9:06   Upon reassessment patient was sleeping in dining room  She denied depression,anxiety,SI/HI, or hallucinations  Q 7 minute safety checks maintained

## 2020-01-05 NOTE — PROGRESS NOTES
Progress Note - Behavioral Health   Salena File 69 y o  female MRN: [id number]  Unit/Bed#: Cherelle Jung 202-02 Encounter: [id number]    Assessment/Plan   Principal Problem:    Major depressive disorder, recurrent episode, moderate (HCC)  Active Problems:    COPD exacerbation ([address] )    Essential hypertension    Acute low back pain    CAD (coronary artery disease), native coronary artery      Behavior over the last 24 hours:  improved  Sleep: improving  Appetite: increased  Medication side effects: No  ROS: no complaints, all other systems are negative    Mental Status Evaluation:  Appearance:  well groomed   Behavior:  cooperative   Speech:  normal volume   Mood:  normal   Affect:  mood-congruent   Thought Process:  normal   Thought Content:  normal   Perceptual Disturbances: None   Risk Potential: Suicidal Ideations none   Sensorium:  person, place and time/date   Cognition:  grossly intact   Consciousness:  alert and awake    Attention: attention span and concentration were age appropriate   Insight:  fair   Judgment: fair   Gait/Station: uses walker   Motor Activity: no abnormal movements     Progress Toward Goals: improving  Recommended Treatment: Continue with group therapy, milieu therapy and occupational therapy  Risks, benefits and possible side effects of Medications:   Risks, benefits, and possible side effects of medications explained to patient and patient verbalizes understanding  Medications: all current active meds have been reviewed  Labs: were reviewed    Counseling / Coordination of Care  Total floor / unit time spent today 20 minutes  Greater than 50% of total time was spent with the patient and / or family counseling and / or coordination of care

## 2020-01-05 NOTE — PROGRESS NOTES
Patient is currently in her bed, sleeping  No obvious signs of distress or discomfort observed  Patient did wake once thus far to utilize the bathroom but was able to return to sleep  PRN Melatonin appears to be effective for patient  Oxygen maintained at 4L via NC; breathing is easy and non-labored  No complaints voiced  Q7 minute safety checks in place

## 2020-01-05 NOTE — PROGRESS NOTES
[phone]    Patient was observed to be in the community this evening; attending both group and snack times  She is pleasant and cooperative with this writer at time of assessment  Patient denies any pain  She denies anxiety and depression, denies SI, HI and hallucinations  She states, "I am feeling good  It feels like I'm on a vacation "  Oxygen maintained via 4L NC; she denies any SOB; respirations are easy and non-labored  Patient was medication compliant at   She did receive one PRN dose of Melatonin per patient request for insomnia; will monitor for medication effectiveness  No acute behaviors exhibited  Will CTM via q7 minute safety checks

## 2020-01-05 NOTE — PROGRESS NOTES
Rolly Conn  BWF:[birth year] F  EBL:[id number]    OLZ:[id number]  Adm Date: 12/28/2019 1548  3:48 PM   [facility] [address]         Subjective     The patient was seen after reviewing the chart and discussing the case with caring staff  Today during our encounter, the patient reported no acute concerns  Objective     Vitals:    01/05/20 0723   BP: 128/70   Pulse: 81   Resp: 18   Temp: (!) 97 3 °F (36 3 °C)   SpO2: 96%       General Appearance: Awake and Alert  No acute distress  HEENT: Normocephalic, atraumatic  PERRLA, EOMI, MMM  Heart: RRR, no murmurs  Normal S1 and S2   Lungs: CTA bilaterally with fair air entry  Assessment     Nolan Carlton is a(n) 69y o  year old female with MDD      1  Cardiac with history of Hypertension, Hyperlipidemia, and CAD s/p MI  Amlodipine 5mg, Coreg 25mg BID, HCTZ 25mg, and lisinopril 10mg  2  COPD  Symbicort, Xopenex, PRN albuterol, prednisone 20mg, supplemental oxygen  3  DARLING  Much improved  Caution NSAID's   4  Weight loss  Ensure TID  5  DJD/OA  Tylenol as needed for mild-moderate pain and Tramadol 50mg every 6 hours as needed for severe pain  6  Diarrhea  Imodium as needed  7  Vitamin D Deficiency  Vitamin D3 1000U daily  The patient was discussed with Dr Simran Montiel and he is in agreement with the above note

## 2020-01-06 PROCEDURE — 99232 SBSQ HOSP IP/OBS MODERATE 35: CPT | Performed by: NURSE PRACTITIONER

## 2020-01-06 PROCEDURE — 94760 N-INVAS EAR/PLS OXIMETRY 1: CPT

## 2020-01-06 PROCEDURE — 94664 DEMO&/EVAL PT USE INHALER: CPT

## 2020-01-06 RX ORDER — LIDOCAINE 50 MG/G
1 PATCH TOPICAL DAILY
Status: DISCONTINUED | OUTPATIENT
Start: 2020-01-06 | End: 2020-01-08 | Stop reason: HOSPADM

## 2020-01-06 RX ORDER — ALBUTEROL SULFATE 90 UG/1
2 AEROSOL, METERED RESPIRATORY (INHALATION) EVERY 4 HOURS PRN
Status: DISCONTINUED | OUTPATIENT
Start: 2020-01-06 | End: 2020-01-08 | Stop reason: HOSPADM

## 2020-01-06 RX ADMIN — AMLODIPINE BESYLATE 5 MG: 5 TABLET ORAL at 08:26

## 2020-01-06 RX ADMIN — TRAZODONE HYDROCHLORIDE 50 MG: 50 TABLET ORAL at 21:36

## 2020-01-06 RX ADMIN — HYDROCHLOROTHIAZIDE 25 MG: 12.5 TABLET ORAL at 08:26

## 2020-01-06 RX ADMIN — LISINOPRIL 10 MG: 10 TABLET ORAL at 08:26

## 2020-01-06 RX ADMIN — LIDOCAINE 1 PATCH: 50 PATCH TOPICAL at 11:59

## 2020-01-06 RX ADMIN — CARVEDILOL 25 MG: 25 TABLET, FILM COATED ORAL at 08:26

## 2020-01-06 RX ADMIN — ESCITALOPRAM OXALATE 10 MG: 10 TABLET ORAL at 08:26

## 2020-01-06 RX ADMIN — BUDESONIDE AND FORMOTEROL FUMARATE DIHYDRATE 2 PUFF: 160; 4.5 AEROSOL RESPIRATORY (INHALATION) at 17:10

## 2020-01-06 RX ADMIN — PREDNISONE 20 MG: 10 TABLET ORAL at 08:26

## 2020-01-06 RX ADMIN — VITAMIN D, TAB 1000IU (100/BT) 1000 UNITS: 25 TAB at 08:26

## 2020-01-06 RX ADMIN — TRAMADOL HYDROCHLORIDE 50 MG: 50 TABLET, FILM COATED ORAL at 10:52

## 2020-01-06 RX ADMIN — CARVEDILOL 25 MG: 25 TABLET, FILM COATED ORAL at 17:09

## 2020-01-06 RX ADMIN — MELATONIN 3 MG: at 22:26

## 2020-01-06 RX ADMIN — CALCIUM 2 TABLET: 500 TABLET ORAL at 08:26

## 2020-01-06 NOTE — PLAN OF CARE
Problem: Ineffective Coping  Goal: Participates in unit activities  Description  Interventions:  - Provide therapeutic environment   - Provide required programming   - Redirect inappropriate behaviors   Outcome: Progressing   Patient joins groups daily she is bright and pleasant; she enjoys socializing and coloring independently

## 2020-01-06 NOTE — PROGRESS NOTES
Maintain 4L O2 via nasal canula  No SOB or any respiratory distress  Patient appears to be sleeping without difficulty throughout the night during 7 minute safety checks  No behavioral issues  Will continue to monitor safety and behaviors every 7 minutes

## 2020-01-06 NOTE — PROGRESS NOTES
01/06/20 0957   Team Meeting   Meeting Type Daily Rounds   Team Members Present   Team Members Present Physician;Nurse;;; Occupational Therapist   Physician Team Member Dr Niraj Jones MD; José Miguel D[address]   Nursing Team Member Lance Sinclair, YASIR   Care Management Team Member MS Piotr, [place], [place]   OT Team Member Smita Moreno, [place]   Patient/Family Present   Patient Present No   Patient's Family Present No     PT to D/C wed , more visible, reports feeling better and is glad she came in for treatment

## 2020-01-06 NOTE — PROGRESS NOTES
Pt up ad isabel with walker  Continuous Nasal O2 on @ 4L/min via cannula  Lungs clear upon auscultation  Pt medicated for lower back pain  with Ultram po as ordered by MD  Pt c/o depression 2/10 & denied any anxiety  Q 7 min checks maintained to monitor pt's behavior & safety  Pt denies any suicidal or homicidal ideations  Pt does attend Group & socializes with other patients

## 2020-01-06 NOTE — PROGRESS NOTES
Maintain 4L O2 via nasal canula SaO2 98%  No SOB or distress noted  Patient out in the milieu social with peers and staff  Ate HS snack  Upon approach patient's mood and affect is bright and pleasant  Patient does have a sarcastic edge but in a subtle way  Denies SI/HI/hallucinationspain/anxiety/depression  Took medications without difficulty  Patient requested Melatonin given at 2214  No behavioral issues  Will continue to monitor safety and behaviors every 7 minutes

## 2020-01-06 NOTE — PROGRESS NOTES
Donna Mehta  AQF:[birth year] F  QJE:[id number]    MSM:[id number]  Adm Date: 12/28/2019 1548  3:48 PM   ATT PHY: Valeria Nicolas, [address]         Subjective     The patient was seen after reviewing the chart and discussing the case with caring staff  Today during our encounter, the patient reported worsening in her low back pain in sciatica  It is mildly helped by Tramadol, but she is asking for an increase in the medication or a change in medication  Patient is a possible discharge for Wednesday  Objective     Vitals:    01/06/20 1026   BP:    Pulse: 80   Resp: 16   Temp:    SpO2: 98%       General Appearance: Awake and Alert  No acute distress  HEENT: Normocephalic, atraumatic  PERRLA, EOMI, MMM  Heart: RRR, no murmurs  Normal S1 and S2   Lungs: CTA bilaterally with fair air entry  Assessment     Daquan Green is a(n) 69y o  year old female with MDD      1  Cardiac with history of Hypertension, Hyperlipidemia, and CAD s/p MI  Amlodipine 5mg, Coreg 25mg BID, HCTZ 25mg, and lisinopril 10mg  2  COPD  Symbicort, Xopenex, PRN albuterol, prednisone 20mg, supplemental oxygen  3  DARLING  Much improved  Caution NSAID's   4  Weight loss  Ensure TID  5  DJD/OA  Tylenol as needed for mild-moderate pain and Tramadol 50mg every 6 hours as needed for severe pain  Will add Lidoderm Patch for the low back  6  Diarrhea  Imodium as needed  7  Vitamin D Deficiency  Vitamin D3 1000U daily  The patient was discussed with Dr Sheila Gibbons and he is in agreement with the above note

## 2020-01-07 PROBLEM — M54.50 ACUTE LOW BACK PAIN: Status: RESOLVED | Noted: 2018-12-16 | Resolved: 2020-01-07

## 2020-01-07 PROBLEM — J44.1 COPD EXACERBATION (HCC): Status: RESOLVED | Noted: 2018-12-16 | Resolved: 2020-01-07

## 2020-01-07 PROCEDURE — 99232 SBSQ HOSP IP/OBS MODERATE 35: CPT | Performed by: NURSE PRACTITIONER

## 2020-01-07 RX ORDER — ESCITALOPRAM OXALATE 10 MG/1
10 TABLET ORAL DAILY
Qty: 30 TABLET | Refills: 0 | Status: SHIPPED | OUTPATIENT
Start: 2020-01-08

## 2020-01-07 RX ORDER — PREDNISONE 20 MG/1
20 TABLET ORAL DAILY
Qty: 30 TABLET | Refills: 0 | Status: SHIPPED | OUTPATIENT
Start: 2020-01-08

## 2020-01-07 RX ORDER — MELATONIN
1000 DAILY
Qty: 30 TABLET | Refills: 1 | Status: SHIPPED | OUTPATIENT
Start: 2020-01-08

## 2020-01-07 RX ORDER — TRAZODONE HYDROCHLORIDE 50 MG/1
50 TABLET ORAL
Qty: 30 TABLET | Refills: 0 | Status: SHIPPED | OUTPATIENT
Start: 2020-01-07 | End: 2020-01-07

## 2020-01-07 RX ORDER — LANOLIN ALCOHOL/MO/W.PET/CERES
3 CREAM (GRAM) TOPICAL
Qty: 30 TABLET | Refills: 0 | Status: SHIPPED | OUTPATIENT
Start: 2020-01-07 | End: 2020-01-07

## 2020-01-07 RX ORDER — BUDESONIDE AND FORMOTEROL FUMARATE DIHYDRATE 160; 4.5 UG/1; UG/1
2 AEROSOL RESPIRATORY (INHALATION) 2 TIMES DAILY
Qty: 1 INHALER | Refills: 0 | Status: SHIPPED | OUTPATIENT
Start: 2020-01-07

## 2020-01-07 RX ORDER — ALBUTEROL SULFATE 90 UG/1
2 AEROSOL, METERED RESPIRATORY (INHALATION) EVERY 6 HOURS PRN
Qty: 1 INHALER | Refills: 0 | Status: SHIPPED | OUTPATIENT
Start: 2020-01-07

## 2020-01-07 RX ORDER — HYDROCHLOROTHIAZIDE 25 MG/1
25 TABLET ORAL DAILY
Qty: 30 TABLET | Refills: 0 | Status: SHIPPED | OUTPATIENT
Start: 2020-01-07

## 2020-01-07 RX ORDER — ENALAPRIL MALEATE 20 MG/1
20 TABLET ORAL 2 TIMES DAILY
Qty: 60 TABLET | Refills: 0 | Status: SHIPPED | OUTPATIENT
Start: 2020-01-07 | End: 2020-01-08 | Stop reason: HOSPADM

## 2020-01-07 RX ORDER — LANOLIN ALCOHOL/MO/W.PET/CERES
3 CREAM (GRAM) TOPICAL
Qty: 30 TABLET | Refills: 0 | Status: SHIPPED | OUTPATIENT
Start: 2020-01-07

## 2020-01-07 RX ORDER — LIDOCAINE 50 MG/G
1 PATCH TOPICAL DAILY
Qty: 30 PATCH | Refills: 0 | Status: SHIPPED | OUTPATIENT
Start: 2020-01-07 | End: 2021-08-30 | Stop reason: HOSPADM

## 2020-01-07 RX ORDER — CARVEDILOL 25 MG/1
25 TABLET ORAL 2 TIMES DAILY WITH MEALS
Qty: 60 TABLET | Refills: 0 | Status: SHIPPED | OUTPATIENT
Start: 2020-01-07 | End: 2021-08-30 | Stop reason: HOSPADM

## 2020-01-07 RX ORDER — AMLODIPINE BESYLATE 5 MG/1
5 TABLET ORAL DAILY
Qty: 30 TABLET | Refills: 0 | Status: SHIPPED | OUTPATIENT
Start: 2020-01-07

## 2020-01-07 RX ORDER — ESCITALOPRAM OXALATE 10 MG/1
10 TABLET ORAL DAILY
Qty: 30 TABLET | Refills: 0 | Status: SHIPPED | OUTPATIENT
Start: 2020-01-08 | End: 2020-01-07

## 2020-01-07 RX ORDER — TRAZODONE HYDROCHLORIDE 50 MG/1
50 TABLET ORAL
Qty: 30 TABLET | Refills: 0 | Status: SHIPPED | OUTPATIENT
Start: 2020-01-07 | End: 2021-08-30 | Stop reason: HOSPADM

## 2020-01-07 RX ADMIN — HYDROCHLOROTHIAZIDE 25 MG: 12.5 TABLET ORAL at 08:58

## 2020-01-07 RX ADMIN — LIDOCAINE 1 PATCH: 50 PATCH TOPICAL at 09:01

## 2020-01-07 RX ADMIN — TRAZODONE HYDROCHLORIDE 50 MG: 50 TABLET ORAL at 21:20

## 2020-01-07 RX ADMIN — CARVEDILOL 25 MG: 25 TABLET, FILM COATED ORAL at 16:14

## 2020-01-07 RX ADMIN — TRAMADOL HYDROCHLORIDE 50 MG: 50 TABLET, FILM COATED ORAL at 10:28

## 2020-01-07 RX ADMIN — LISINOPRIL 10 MG: 10 TABLET ORAL at 09:01

## 2020-01-07 RX ADMIN — ESCITALOPRAM OXALATE 10 MG: 10 TABLET ORAL at 08:58

## 2020-01-07 RX ADMIN — AMLODIPINE BESYLATE 5 MG: 5 TABLET ORAL at 09:00

## 2020-01-07 RX ADMIN — TRAMADOL HYDROCHLORIDE 50 MG: 50 TABLET, FILM COATED ORAL at 19:52

## 2020-01-07 RX ADMIN — MELATONIN 3 MG: at 22:26

## 2020-01-07 RX ADMIN — CARVEDILOL 25 MG: 25 TABLET, FILM COATED ORAL at 09:00

## 2020-01-07 RX ADMIN — CALCIUM 2 TABLET: 500 TABLET ORAL at 08:59

## 2020-01-07 RX ADMIN — PREDNISONE 20 MG: 10 TABLET ORAL at 08:58

## 2020-01-07 RX ADMIN — VITAMIN D, TAB 1000IU (100/BT) 1000 UNITS: 25 TAB at 09:01

## 2020-01-07 NOTE — PROGRESS NOTES
Maintain 4 L O2 via nasal cannula  SaO2 was 99%  Patient out in the milieu social with peers and staff  Ate HS snack and attended group  Upon approach patient's mood and affect is bright and pleasant  Patient continues to feel good and happy she sought out help for her depression  Denies SI/HI/hallucinations/pain/depression/anxiety  Took medications without difficulty  Patient stated the scheduled Trazodone does not help her fall asleep  Patient requested PRN 3 mg Melatonin  Will continue to monitor safety and behaviors every 7 minutes

## 2020-01-07 NOTE — PROGRESS NOTES
01/07/20 0949   Team Meeting   Meeting Type Daily Rounds   Team Members Present   Team Members Present Physician;Nurse;;Occupational Therapist   Physician Team Member Dr Sakina Mccauley MD; Nolvia Kent, [place]   Nursing Team Member Maureen Priest RN   Care Management Team Member MS Piotr, [facility]   OT Team Member [place], [place]   Patient/Family Present   Patient Present No   Patient's Family Present No     D/C tomorrow at 1100am  Taking PRNs  Denies anxiety and depression  Make a referral for home health  Ok with PT following up with PCP, vs  Psych  Will provide psych contact information

## 2020-01-07 NOTE — PROGRESS NOTES
Progress Note - [address] 69 y o  female MRN: [id number]   Unit/Bed#: [facility] 206-01 Encounter: [id number]    Behavior over the last 24 hours: improved  Tori Pal  Reports she continues to feel improvement in mood and looking forward to discharge  Some concern about how she will her medications, will discuss with   Participates appropriately in milieu  Attends groups  Sleep: improved  Appetite: fair  Medication side effects: No   ROS: all other systems are negative    Mental Status Evaluation:    Appearance:  age appropriate   Behavior:  pleasant, cooperative   Speech:  normal rate, normal volume   Mood:  improved   Affect:  brighter   Thought Process:  logical, coherent   Associations: intact associations   Thought Content:  no overt delusions   Perceptual Disturbances: none   Risk Potential: Suicidal ideation - None  Homicidal ideation - None  Potential for aggression - No   Sensorium:  oriented to person, place and time/date   Memory:  recent and remote memory grossly intact   Consciousness:  alert and awake   Attention: attention span and concentration are age appropriate   Insight:  improving   Judgment: fair   Gait/Station: uses walker   Motor Activity: no abnormal movements     Vital signs in last 24 hours:    Temp:  [97 5 °F (36 4 °C)-97 8 °F (36 6 °C)] 97 5 °F (36 4 °C)  HR:  [82-92] 82  Resp:  [16-18] 16  BP: (109-120)/(61-66) 120/66    Laboratory results: I have personally reviewed all pertinent laboratory/tests results      Suicide/Homicide Risk Assessment:    Risk of Harm to Self:   Current Specific Risk Factors include: diagnosis of depression  Protective Factors: no current suicidal ideation, ability to communicate with staff on the unit, able to contract for safety on the unit, taking medications as ordered on the unit  Based on today's assessment, Tori Pal presents the following risk of harm to self: low    Risk of Harm to Others:  Current Specific Risk Factors include: none  Based on today's assessment, Honey Cuellar presents the following risk of harm to others: none    The following interventions are recommended: behavioral checks every 7 minutes, continued hospitalization on locked unit     Progress Toward Goals: continues to improve    Assessment/Plan   Principal Problem:    Major depressive disorder, recurrent episode, moderate (HCC)  Active Problems:    Essential hypertension    CAD (coronary artery disease), native coronary artery    Recommended Treatment:     Planned medication and treatment changes:     All current active medications have been reviewed  Encourage group therapy, milieu therapy and occupational therapy  Behavioral Health checks every 7 minutes  Continue current medications:    Current Facility-Administered Medications:  acetaminophen 650 mg Oral Q6H PRN Navin Haas MD   acetaminophen 650 mg Oral Q4H PRN Navin Haas MD   albuterol 2 puff Inhalation Q4H PRN Valeria Nicolas MD   aluminum-magnesium hydroxide-simethicone 30 mL Oral Q4H PRN Navin Haas MD   amLODIPine 5 mg Oral Daily Curtis Lopes MD   budesonide-formoterol 2 puff Inhalation BID Curtis Lopes MD   calcium carbonate 2 tablet Oral Daily With Breakfast Curtis Lopes MD   carvedilol 25 mg Oral BID With Meals Curtis Lopes MD   cholecalciferol 1,000 Units Oral Daily Rosario Garcia PA-C   diphenhydrAMINE 50 mg Intramuscular Q6H PRN Lyndsya Selby PA-C   diphenhydrAMINE 50 mg Oral Q6H PRN Lyndsay Selby PA-C   escitalopram 10 mg Oral Daily Vibha Shaikh MD   haloperidol 2 mg Oral Q8H PRN Navin Haas MD   haloperidol lactate 2 mg Intramuscular Q8H PRN Iwonasnury Haas MD   hydrochlorothiazide 25 mg Oral Daily Curtis Lopes MD   hydrOXYzine HCL 25 mg Oral Q4H PRN Lyndsay Selby PA-C   hydrOXYzine HCL 50 mg Oral Q6H PRN Lyndsay Selby PA-C   lidocaine 1 patch Topical Daily Roderick Garcia PA-C   lisinopril 10 mg Oral Daily ANGÉLICA Ewing magnesium hydroxide 30 mL Oral Daily PRN Navin Haas MD   melatonin 3 mg Oral HS PRN Krista Rodriguez PA-C   predniSONE 20 mg Oral Daily Rona Patrick MD   traMADol 50 mg Oral Q6H PRN Juan Garcia PA-C   traZODone 50 mg Oral HS Kae Cazares MD       Risks / Benefits of Treatment:    Risks, benefits, and possible side effects of medications explained to patient and patient verbalizes understanding and agreement for treatment  Counseling / Coordination of Care:    Patient's progress discussed with staff in treatment team meeting  Medications, treatment progress and treatment plan reviewed with patient      TAMMIE Patel 01/07/20

## 2020-01-07 NOTE — PROGRESS NOTES
Progress Note - [address] 69 y o  female MRN: [id number]   Unit/Bed#: [facility] 206-01 Encounter: [id number]    Behavior over the last 24 hours: improved  Tony Chatterjee reports depression is significantly improved  Participates appropriately in milieu  Attends groups  Sleep: improved  Appetite: normal  Medication side effects: No   ROS: all other systems are negative    Mental Status Evaluation:    Appearance:  age appropriate   Behavior:  cooperative   Speech:  normal rate, normal volume   Mood:  depressed   Affect:  flat   Thought Process:  logical   Associations: intact associations   Thought Content:  normal   Perceptual Disturbances: none   Risk Potential: Suicidal ideation - None  Homicidal ideation - None  Potential for aggression - No   Sensorium:  oriented to person, place, time/date and situation   Memory:  recent and remote memory grossly intact   Consciousness:  alert and awake   Attention: attention span and concentration are age appropriate   Insight:  improved   Judgment: age appropriate   Gait/Station: needs assistive device   Motor Activity: no abnormal movements     Vital signs in last 24 hours:    Temp:  [97 5 °F (36 4 °C)-97 8 °F (36 6 °C)] 97 5 °F (36 4 °C)  HR:  [82-92] 82  Resp:  [16-18] 16  BP: (109-120)/(61-66) 120/66    Laboratory results: I have personally reviewed all pertinent laboratory/tests results      Suicide/Homicide Risk Assessment:    Risk of Harm to Self:   Current Specific Risk Factors include: recent suicidal ideation  Protective Factors: no current suicidal ideation, ability to communicate with staff on the unit, able to contract for safety on the unit, taking medications as ordered on the unit  Based on today's assessment, Tony Chatterjee presents the following risk of harm to self: low    Risk of Harm to Others:  Current Specific Risk Factors include: none  Based on today's assessment, Tony Chatterjee presents the following risk of harm to others: none    The following interventions are recommended: behavioral checks every 7 minutes, continued hospitalization on locked unit     Progress Toward Goals: progressing    Assessment/Plan   Principal Problem:    Major depressive disorder, recurrent episode, moderate (HCC)  Active Problems:    Essential hypertension    CAD (coronary artery disease), native coronary artery    Recommended Treatment:     Planned medication and treatment changes:     All current active medications have been reviewed  Encourage group therapy, milieu therapy and occupational therapy  Behavioral Health checks every 7 minutes  Continue current medications:    Current Facility-Administered Medications:  acetaminophen 650 mg Oral Q6H PRN Navin Haas MD   acetaminophen 650 mg Oral Q4H PRN Navin Haas MD   albuterol 2 puff Inhalation Q4H PRN Ritu Mcmahon MD   aluminum-magnesium hydroxide-simethicone 30 mL Oral Q4H PRN Navin Haas MD   amLODIPine 5 mg Oral Daily Bekah Cruz MD   budesonide-formoterol 2 puff Inhalation BID Bekah Cruz MD   calcium carbonate 2 tablet Oral Daily With Breakfast Bekah Cruz MD   carvedilol 25 mg Oral BID With Meals Bekah Cruz MD   cholecalciferol 1,000 Units Oral Daily Jose F Garcia PA-C   diphenhydrAMINE 50 mg Intramuscular Q6H PRN May Fitzpatrick PA-C   diphenhydrAMINE 50 mg Oral Q6H PRN May Fitzpatrick PA-C   escitalopram 10 mg Oral Daily Yobany Alicea MD   haloperidol 2 mg Oral Q8H PRN Navin Haas MD   haloperidol lactate 2 mg Intramuscular Q8H PRN Navin Haas MD   hydrochlorothiazide 25 mg Oral Daily Bekah Cruz MD   hydrOXYzine HCL 25 mg Oral Q4H PRN May Fitzpatrick PA-C   hydrOXYzine HCL 50 mg Oral Q6H PRN May Fitzpatrick PA-C   lidocaine 1 patch Topical Daily Roderick Garcia PA-C   lisinopril 10 mg Oral Daily Roderick Garica PA-C   magnesium hydroxide 30 mL Oral Daily PRN Navin Haas MD   melatonin 3 mg Oral HS PRN May Fitzpatrick PA-C   predniSONE 20 mg Oral Daily Connie Owusu MD   traMADol 50 mg Oral Q6H PRN Osmel Garcia PA-C   traZODone 50 mg Oral HS Rufino Hernandez MD       Risks / Benefits of Treatment:    Risks, benefits, and possible side effects of medications explained to patient and patient verbalizes understanding and agreement for treatment  Counseling / Coordination of Care:    Patient's progress discussed with staff in treatment team meeting  Medications, treatment progress and treatment plan reviewed with patient      TAMMIE Agarwal 01/07/20

## 2020-01-07 NOTE — PROGRESS NOTES
Pt denies any depression or anxiety  Q 7 min checks maintained to monitor pt's behavior & safety  Pt medicated for lower back & lt hip pain with Ultram po as ordered by MD with moderate relief obtained  Pt denies any suicidal or homicidal ideations  Pt is pleasant & cooperative  Pt is on continuous Nasal O2 @ 4L/min via cannula  Lungs clear upon auscultation  Pt up ad isabel with walker

## 2020-01-07 NOTE — BH TRANSITION RECORD
Contact Information: If you have any questions, concerns, pended studies, tests and/or procedures, or emergencies regarding your inpatient behavioral health visit  Please contact [facility] [phone] and ask to speak to a , nurse or physician  A contact is available 24 hours/ 7 days a week at this number  Summary of Procedures Performed During your Stay:  Below is a list of major procedures performed during your hospital stay and a summary of results:  - No major procedures performed  Pending Studies (From admission, onward)    None        If studies are pending at discharge, follow up with your PCP and/or referring provider

## 2020-01-07 NOTE — PROGRESS NOTES
Jack Cueto  :[birth year] F  UBE:[id number]    DHC:[id number]  Adm Date: 12/28/2019 1548  3:48 PM   ATT PHY: Angela Gan, [address]         Subjective     The patient was seen after reviewing the chart and discussing the case with caring staff  Today during our encounter, the patient reported no acute concerns  Of note, patient is scheduled for discharge tomorrow  Patient is medically cleared for discharge  Medication list has been reconciled  Scripts have been filled out  Objective     Vitals:    01/07/20 0730   BP: 120/66   Pulse: 82   Resp: 16   Temp: 97 5 °F (36 4 °C)   SpO2: 100%       General Appearance: Awake and Alert  No acute distress  HEENT: Normocephalic, atraumatic  PERRLA, EOMI, MMM  Heart: RRR, no murmurs  Normal S1 and S2   Lungs: CTA bilaterally with fair air entry  Assessment     Gustavo Sommer is a(n) 69y o  year old female with MDD  Medical Clearance: Patient is medically cleared for discharge  Medication list has been reconciled  Scripts have been addressed      1  Cardiac with history of Hypertension, Hyperlipidemia, and CAD s/p MI  Amlodipine 5mg, Coreg 25mg BID, HCTZ 25mg, and lisinopril 10mg  2  COPD  Symbicort, Xopenex, PRN albuterol, prednisone 20mg, supplemental oxygen  3  DARLING  Much improved  Caution NSAID's   4  Weight loss  Ensure TID  5  DJD/OA  Tylenol as needed for mild-moderate pain and Tramadol 50mg every 6 hours as needed for severe pain  Lidoderm Patch for the low back  6  Diarrhea  Imodium as needed  7  Vitamin D Deficiency  Vitamin D3 1000U daily  The patient was discussed with Dr Yazmin García and he is in agreement with the above note

## 2020-01-07 NOTE — PROGRESS NOTES
Pt socializes with other patients & attends Group  Q 7 min checks maintained to monitor pt's behavior & safety

## 2020-01-07 NOTE — MALNUTRITION/BMI
This medical record reflects one or more clinical indicators suggestive of malnutrition and/or morbid obesity  BMI Findings: Body mass index is 18 37 kg/m²  See Nutrition note dated 1/7/2020 for additional details  Completed nutrition assessment is viewable in the nutrition documentation

## 2020-01-07 NOTE — PROGRESS NOTES
Maintain 4 L O2 via nasal canula  NO SOB or respiratory distress noted  Patient appears to be sleeping without difficulty throughout the night  No behavioral issues  No complaints or concerns  Will continue to monitor safety and behaviors every 7 minutes

## 2020-01-07 NOTE — PLAN OF CARE
Problem: Ineffective Coping  Goal: Participates in unit activities  Description  Interventions:  - Provide therapeutic environment   - Provide required programming   - Redirect inappropriate behaviors   Outcome: Progressing   Patient continues to be pleasant and friendly; she is social with peers and enjoys coloring and watching TV in downtime with staff and peers

## 2020-01-08 ENCOUNTER — TELEPHONE (OUTPATIENT)
Dept: CASE MANAGEMENT | Facility: HOSPITAL | Age: 75
End: 2020-01-08

## 2020-01-08 VITALS
WEIGHT: 117.28 LBS | DIASTOLIC BLOOD PRESSURE: 58 MMHG | RESPIRATION RATE: 18 BRPM | BODY MASS INDEX: 18.41 KG/M2 | SYSTOLIC BLOOD PRESSURE: 119 MMHG | OXYGEN SATURATION: 100 % | HEART RATE: 87 BPM | HEIGHT: 67 IN | TEMPERATURE: 98.1 F

## 2020-01-08 PROCEDURE — 99238 HOSP IP/OBS DSCHRG MGMT 30/<: CPT | Performed by: PSYCHIATRY & NEUROLOGY

## 2020-01-08 RX ORDER — ENALAPRIL MALEATE 5 MG/1
5 TABLET ORAL 2 TIMES DAILY
Qty: 60 TABLET | Refills: 1 | Status: SHIPPED | OUTPATIENT
Start: 2020-01-08

## 2020-01-08 RX ADMIN — CALCIUM 2 TABLET: 500 TABLET ORAL at 08:39

## 2020-01-08 RX ADMIN — AMLODIPINE BESYLATE 5 MG: 5 TABLET ORAL at 08:39

## 2020-01-08 RX ADMIN — LIDOCAINE 1 PATCH: 50 PATCH TOPICAL at 08:34

## 2020-01-08 RX ADMIN — VITAMIN D, TAB 1000IU (100/BT) 1000 UNITS: 25 TAB at 08:42

## 2020-01-08 RX ADMIN — ESCITALOPRAM OXALATE 10 MG: 10 TABLET ORAL at 08:40

## 2020-01-08 RX ADMIN — TRAMADOL HYDROCHLORIDE 50 MG: 50 TABLET, FILM COATED ORAL at 08:43

## 2020-01-08 RX ADMIN — CARVEDILOL 25 MG: 25 TABLET, FILM COATED ORAL at 08:40

## 2020-01-08 RX ADMIN — LISINOPRIL 10 MG: 10 TABLET ORAL at 08:41

## 2020-01-08 RX ADMIN — PREDNISONE 20 MG: 10 TABLET ORAL at 08:40

## 2020-01-08 RX ADMIN — HYDROCHLOROTHIAZIDE 25 MG: 12.5 TABLET ORAL at 08:40

## 2020-01-08 NOTE — DISCHARGE INSTR - OTHER ORDERS
You are being discharged to your home at [address], [place], [address]; Phone: [phone]    Triggers you have identified during your hospitalization that led to your distressed mood include isolation and ineffective coping skills  Coping skills you have identified during your hospitalization include reading and watching tv  If you are unable to deal with your distressed mood alone please contact your son, Aida Angeles or your primary care physician, Dr Lana Dockery  If that is not effective and you continue to have distressed mood, please contact [address] at [phone], dial [phone], or go to the nearest emergency center  *National Suicide Prevention Lifeline:  4-605.650.8680  *[address]  on Mental Illness ([place]) [employer]: 353.625.9340/Website: www yamileth org  *Substance Abuse and Mental Health Services Administration(SAMHSA) [employer], which is a confidential, free, 24-hour-a-day, 365-day-a-year, information service for individuals and family members facing mental health and/or substance use disorders  This service provides referrals to local treatment facilities, support groups, and community-based organizations  Callers can also order free publications and other information  Call 9-127.893.3682/Website: www [facility] gov  *[facility] 2-1-1: This is a toll free, confidential, 24-hour-a-day service which connects you to a community  in your area who can help you find services and resources that are available to you locally and provide critical services that can improve and save lives    Call: 211  /Website: https://jenaroEducabiliaduncan rodas/

## 2020-01-08 NOTE — PROGRESS NOTES
Patient ambulated to the nurses station to inform of inability to sleep and requests PRN Melatonin  Administered medication as per order for insomnia  Will monitor for medication effectiveness

## 2020-01-08 NOTE — PROGRESS NOTES
01/08/20 1018   Team Meeting   Meeting Type Daily Rounds   Team Members Present   Team Members Present Physician;Nurse;;; Occupational Therapist   Physician Team Member Dr Ramírez Tavares MD; Benjamin Haley, [address] [place] [address]    Nursing Team Member Janice Pichardo, YASIR    Care Management Team Member Michelle Grimes 88, [facility], [facility]    Social Work Team Member Zane Whittaker, [facility]    OT Team Member Tate Pedroza [place]    Patient/Family Present   Patient Present No   Patient's Family Present No     Discharge today at 11am to home with services; waiver services and home health referrals to be made by SW

## 2020-01-08 NOTE — PROGRESS NOTES
Patient was observed to be in the community this evening  She is pleasant and cooperative with this writer at time of assessment  She denies any anxiety or depression, denies SI, HI and hallucinations  She c/o chronic, left sciatic pain with a rating of 8/10 for which she received one dose of PRN Ultram   On reassessment, pain medication was reduced to 3/10  No acute behaviors exhibited  Lidoderm patch removed as per order  Oxygen maintained at 4L via NC; breathing is easy and non-labored  Q7 minute safety checks in place

## 2020-01-08 NOTE — PROGRESS NOTES
Pt visible out in milieu for meals and group  Brightens within conversation  Happy to be discharged today  Denies SI/HI and any hallucinations  Q 7 min checks maintained

## 2020-01-08 NOTE — TELEPHONE ENCOUNTER
LSW received phone call from Josseline Gibbons at [facility] - will accept patient for RN, PT/OT services to start tomorrow;  Provider requested Scout Jacob order, face sheet, med list, H&P and AVS be faxed to [phone]     faxed requested information (2) good, crying

## 2020-01-08 NOTE — BH TRANSITION RECORD
Contact Information: If you have any questions, concerns, pended studies, tests and/or procedures, or emergencies regarding your inpatient behavioral health visit  Please contact [facility] older adult behavioral health unit [phone] and ask to speak to a , nurse or physician  A contact is available 24 hours/ 7 days a week at this number  Summary of Procedures Performed During your Stay:  Below is a list of major procedures performed during your hospital stay and a summary of results:  - No major procedures performed  Pending Studies (From admission, onward)    None        If studies are pending at discharge, follow up with your PCP and/or referring provider

## 2020-01-08 NOTE — DISCHARGE INSTR - APPOINTMENTS
The treatment team recommends ongoing medication management upon discharge  You requested to follow up with your primary care physician, who is currently prescribing your psychiatric medications, Dr Shimon Gamboa, [facility], [address] [place] [address] , Phone: [phone]  You are scheduled to follow up with Dr Pelon Garcia on Thursday, Jan 16th at 215pm   Your discharge will be faxed to this provider for continuity of care  A referral was made on your behalf for waiver services through [facility] to [facility] ([address] ) Phone: [phone]  A  will call you within 7 days to schedule an intake appointment in your home  Your home telephone number was provided: [phone]  A referral has also been made on your behalf for home health care services with [address] [place] [address] [phone] for nursing, PT/OT services within your home  A staff member from this agency will contact you directly within 48 hours of discharge to determine the best time to begin services  Your discharge will be faxed to this provider for continuity of care

## 2020-01-08 NOTE — DISCHARGE INSTRUCTIONS
Trazodone (By mouth)   Trazodone (TRAZ-oh-done)  Treats depression  Brand Name(s): Catherinero   There may be other brand names for this medicine  When This Medicine Should Not Be Used: This medicine is not right for everyone  Do not use it if you had an allergic reaction to trazodone  How to Use This Medicine:   Tablet, Long Acting Tablet  · Take your medicine as directed  Your dose may need to be changed several times to find what works best for you  · Regular tablet: Take it with or shortly after a meal or light snack  · Extended-release tablet: Take it at the same time each day, preferably at bedtime, without food  · The tablet can be swallowed whole, or you may break the tablet in half along the score line  Do not break the tablet unless your doctor tells you to  Do not crush or chew the tablet  · This medicine should come with a Medication Guide  Ask your pharmacist for a copy if you do not have one  · Missed dose: Take a dose as soon as you remember  If it is almost time for your next dose, wait until then and take a regular dose  Do not take extra medicine to make up for a missed dose  · Store the medicine in a closed container at room temperature, away from heat, moisture, and direct light  Drugs and Foods to Avoid:   Ask your doctor or pharmacist before using any other medicine, including over-the-counter medicines, vitamins, and herbal products  · Do not use trazodone if you currently take an MAO inhibitor (MAOI) or have used an MAOI in the past 14 days    · Tell your doctor if you also use any of the following:  ¨ Carbamazepine, digoxin, phenytoin, indinavir, ritonavir, buspirone, fentanyl, lithium, tryptophan, Gilbert's wort, tramadol  ¨ Medicine to treat a fungal infection (such as itraconazole, ketoconazole), a diuretic (water pill), blood pressure medicine, an NSAID pain or arthritis medicine (such as aspirin, celecoxib, diclofenac, ibuprofen, naproxen), a blood thinner (such as warfarin), other medicine for depression, or triptan medicine to treat migraine headaches  · Do not drink alcohol while you are using this medicine  · Tell your doctor if you use anything else that makes you sleepy  Some examples are allergy medicine, narcotic pain medicine, and alcohol  Warnings While Using This Medicine:   · Tell your doctor if you are pregnant or breastfeeding, or if you have kidney disease, liver disease, bleeding problems, glaucoma, heart disease, heart rhythm problems, or low blood pressure  Tell your doctor if you recently had a heart attack  · For some children, teenagers, and young adults, this medicine may increase mental or emotional problems  This may lead to thoughts of suicide and violence  Talk with your doctor right away if you have any thoughts or behavior changes that concern you  Tell your doctor if you or anyone in your family has a history of bipolar disorder or suicide attempts  · This medicine may cause the following problems:  ¨ Serotonin syndrome (more likely when used with certain other medicines)  ¨ Heart rhythm problems (QT prolongation)  ¨ Low sodium levels  ¨ Higher risk of bleeding  · Do not stop using this medicine suddenly  Your doctor will need to slowly decrease your dose before you stop it completely  · This medicine may make you dizzy or drowsy  Do not drive or do anything that could be dangerous until you know how this medicine affects you  Stand or sit up slowly if you are dizzy  · Tell any doctor or dentist who treats you that you are using this medicine  You may need to stop using this medicine several days before you have surgery or medical tests  · Your doctor will check your progress and the effects of this medicine at regular visits  Keep all appointments  · Keep all medicine out of the reach of children  Never share your medicine with anyone    Possible Side Effects While Using This Medicine:   Call your doctor right away if you notice any of these side effects:  · Allergic reaction: Itching or hives, swelling in your face or hands, swelling or tingling in your mouth or throat, chest tightness, trouble breathing  · Anxiety, restlessness, fever, sweating, muscle spasms, nausea, vomiting, diarrhea, seeing or hearing things that are not there  · Confusion, weakness, muscle twitching  · Fast, pounding, or uneven heartbeat  · Lightheadedness, dizziness, fainting  · Painful, prolonged erection of your penis  · Sudden increase in energy, feeling irritable, trouble sleeping  · Thoughts of hurting yourself or others, unusual behavior  · Unusual bleeding or bruising  If you notice these less serious side effects, talk with your doctor:   · Constipation, mild nausea  · Dry mouth  · Eye pain, vision changes, seeing halos around lights  · Headache  · Sleepiness or unusual drowsiness  If you notice other side effects that you think are caused by this medicine, tell your doctor  Call your doctor for medical advice about side effects  You may report side effects to FDA at 6-126-FDA-3477  © 2017 [address] [employer] Information is for End User's use only and may not be sold, redistributed or otherwise used for commercial purposes  The above information is an  only  It is not intended as medical advice for individual conditions or treatments  Talk to your doctor, nurse or pharmacist before following any medical regimen to see if it is safe and effective for you

## 2020-01-08 NOTE — SOCIAL WORK
PABLO placed phone call to [address] at [facility] [phone] to make referral for Waiver Services; Corinne advised PABLO that a [facility] representative will contact pt directly within the next 10 days to schedule an intake at patient home;  PABLO was referred to [employer] to print out detailed instructions on waiver referral process to provide to patient - SW printed and placed in patient discharge folder     Home Care Referrals: (RN, PT/OT)  PABLO placed phone call to [address] [place] [address]  [phone] - Spoke to Veodin [facility]; no availability in schedule    PABLO placed phone call to [facility] [address] [place] [address] [phone] - Spoke to Jordyn Solomon - no PT/OT services available    Pablo placed phone call to [address] [place] [address] [phone]; spoke to Xavier Camacho 90 referred to [facility] RN to discuss referral - left message on voicemail - awaiting response

## 2020-01-08 NOTE — PROGRESS NOTES
Pt discharged with list of belongings:    Eyeglasses  Black slippers  Hair brush  Green sweater  White beige sweater  Long white underpants  6 white prs of underwear  3 colored underwear  Beige bra  White socks  pj pants  White blue socks  Black suit case  Blue makeup bag  Black sweat pants  Back scratcher  3 envelopes  Photo card  Box of denture cleaning tablets  2 yellow rings with blue stones  Purple long sleeve shirt  Blue long sleeve shirt  Light purple long sleeve shirt  Long red sleeve  Dark blue long sleeve  Blue flower night gown  leopard print house coat

## 2020-01-08 NOTE — PLAN OF CARE
Problem: Risk for Self Injury/Neglect  Goal: Treatment Goal: Remain safe during length of stay, learn and adopt new coping skills, and be free of self-injurious ideation, impulses and acts at the time of discharge  Outcome: Adequate for Discharge  Goal: Verbalize thoughts and feelings  Description  Interventions:  - Assess and re-assess patient's lethality and potential for self-injury  - Engage patient in 1:1 interactions, daily, for a minimum of 15 minutes  - Encourage patient to express feelings, fears, frustrations, hopes  - Establish rapport/trust with patient   Outcome: Adequate for Discharge  Goal: Refrain from harming self  Description  Interventions:  - Monitor patient closely, per order  - Develop a trusting relationship  - Supervise medication ingestion, monitor effects and side effects   Outcome: Adequate for Discharge     Problem: Depression  Goal: Treatment Goal: Demonstrate behavioral control of depressive symptoms, verbalize feelings of improved mood/affect, and adopt new coping skills prior to discharge  Outcome: Adequate for Discharge  Goal: Verbalize thoughts and feelings  Description  Interventions:  - Assess and re-assess patient's level of risk   - Engage patient in 1:1 interactions, daily, for a minimum of 15 minutes   - Encourage patient to express feelings, fears, frustrations, hopes   Outcome: Adequate for Discharge  Goal: Refrain from isolation  Description  Interventions:  - Develop a trusting relationship   - Encourage socialization   Outcome: Adequate for Discharge  Goal: Refrain from self-neglect  Outcome: Adequate for Discharge     Problem: Anxiety  Goal: Anxiety is at manageable level  Description  Interventions:  - Assess and monitor patient's anxiety level  - Monitor for signs and symptoms (heart palpitations, chest pain, shortness of breath, headaches, nausea, feeling jumpy, restlessness, irritable, apprehensive)     - Collaborate with interdisciplinary team and initiate plan and interventions as ordered    - [place] patient to unit/surroundings  - Explain treatment plan  - Encourage participation in care  - Encourage verbalization of concerns/fears  - Identify coping mechanisms  - Assist in developing anxiety-reducing skills  - Administer/offer alternative therapies  - Limit or eliminate stimulants  Outcome: Adequate for Discharge     Problem: DISCHARGE PLANNING - CARE MANAGEMENT  Goal: Discharge to post-acute care or home with appropriate resources  Description  INTERVENTIONS:  - Conduct assessment to determine patient/family and health care team treatment goals, and need for post-acute services based on payer coverage, community resources, and patient preferences, and barriers to discharge  - Address psychosocial, clinical, and financial barriers to discharge as identified in assessment in conjunction with the patient/family and health care team  - Arrange appropriate level of post-acute services according to patients   needs and preference and payer coverage in collaboration with the physician and health care team  - Communicate with and update the patient/family, physician, and health care team regarding progress on the discharge plan  - Arrange appropriate transportation to post-acute venues  Outcome: Adequate for Discharge     Problem: Ineffective Coping  Goal: Participates in unit activities  Description  Interventions:  - Provide therapeutic environment   - Provide required programming   - Redirect inappropriate behaviors   Outcome: Adequate for Discharge

## 2020-01-08 NOTE — NURSING NOTE
Reviewed AVS/discharge packet with patient  No further questions  Belongings, scripts, and AVS/discharge packet given to patient upon discharge  Son, Celia Sanchez, here for   Advised to  prescriptions today  Discharge to home

## 2020-01-08 NOTE — PROGRESS NOTES
Azeem Sport  XUP:1/35/1862 F  CAN:[id number]    FJR:[id number]  Adm Date: 12/28/2019 1548  3:48 PM   ATT PHY: Angelique Yanez, [facility]         Subjective     The patient was seen after reviewing the chart and discussing the case with caring staff  Today during our encounter, the patient reported no acute concerns  Of note, patient is scheduled for discharge today  Patient is medically cleared for discharge  Medication list has been reconciled  Scripts have been filled out  Objective     Vitals:    01/08/20 0729   BP: 119/58   Pulse: 87   Resp: 18   Temp: 98 1 °F (36 7 °C)   SpO2: 100%       General Appearance: Awake and Alert  No acute distress  HEENT: Normocephalic, atraumatic  PERRLA, EOMI, MMM  Heart: RRR, no murmurs  Normal S1 and S2   Lungs: CTA bilaterally with fair air entry  Assessment     Katy Moore is a(n) 69y o  year old female with MDD  Medical Clearance: Patient is medically cleared for discharge  Medication list has been reconciled  Scripts have been addressed      1  Cardiac with history of Hypertension, Hyperlipidemia, and CAD s/p MI  Amlodipine 5mg, Coreg 25mg BID, HCTZ 25mg, and lisinopril 10mg  2  COPD  Symbicort, Xopenex, PRN albuterol, prednisone 20mg, supplemental oxygen  3  DARLING  Much improved  Caution NSAID's   4  Weight loss  Ensure TID  5  DJD/OA  Tylenol as needed for mild-moderate pain and Tramadol 50mg every 6 hours as needed for severe pain  Lidoderm Patch for the low back  6  Diarrhea  Imodium as needed  7  Vitamin D Deficiency  Vitamin D3 1000U daily  The patient was discussed with Dr Suresh Carey and he is in agreement with the above note

## 2020-01-08 NOTE — PROGRESS NOTES
PRN Melatonin appears to have been effective as patient was able to sleep through the remainder of the overnight hours  No complaints voiced  Oxygen maintained via 4L NC with respirations easy and non-labored  Q7 minute safety checks in progress

## 2020-01-08 NOTE — SOCIAL WORK
PABLO placed phone call to pt PCP for follow up appt - offices closed until [facility] placed phone call to [facility] for waiver services referral - office closed until [phone]    SW will attempt both calls again after drs rounds this morning
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
## 2020-09-21 LAB — HCV AB SER-ACNC: NEGATIVE

## 2021-03-30 ENCOUNTER — HOSPITAL ENCOUNTER (EMERGENCY)
Facility: HOSPITAL | Age: 76
Discharge: HOME | End: 2021-03-30
Attending: EMERGENCY MEDICINE
Payer: MEDICARE

## 2021-03-30 ENCOUNTER — APPOINTMENT (EMERGENCY)
Dept: CT IMAGING | Facility: HOSPITAL | Age: 76
End: 2021-03-30
Payer: MEDICARE

## 2021-03-30 VITALS
BODY MASS INDEX: 18.37 KG/M2 | DIASTOLIC BLOOD PRESSURE: 78 MMHG | OXYGEN SATURATION: 100 % | RESPIRATION RATE: 12 BRPM | SYSTOLIC BLOOD PRESSURE: 166 MMHG | TEMPERATURE: 97.8 F | HEIGHT: 67 IN | HEART RATE: 80 BPM

## 2021-03-30 DIAGNOSIS — R53.83 FATIGUE: Primary | ICD-10-CM

## 2021-03-30 LAB
ALBUMIN SERPL BCP-MCNC: 3 G/DL (ref 3.5–5)
ALP SERPL-CCNC: 86 U/L (ref 46–116)
ALT SERPL W P-5'-P-CCNC: 12 U/L (ref 12–78)
ANION GAP SERPL CALCULATED.3IONS-SCNC: -2 MMOL/L (ref 4–13)
AST SERPL W P-5'-P-CCNC: 11 U/L (ref 5–45)
BASOPHILS # BLD MANUAL: 0 THOUSAND/UL (ref 0–0.1)
BASOPHILS NFR MAR MANUAL: 0 % (ref 0–1)
BILIRUB SERPL-MCNC: 0.47 MG/DL (ref 0.2–1)
BILIRUB UR QL STRIP: NEGATIVE
BUN SERPL-MCNC: 12 MG/DL (ref 5–25)
CALCIUM ALBUM COR SERPL-MCNC: 9.9 MG/DL (ref 8.3–10.1)
CALCIUM SERPL-MCNC: 9.1 MG/DL (ref 8.3–10.1)
CHLORIDE SERPL-SCNC: 102 MMOL/L (ref 100–108)
CLARITY UR: CLEAR
CO2 SERPL-SCNC: 43 MMOL/L (ref 21–32)
COLOR UR: YELLOW
CREAT SERPL-MCNC: 0.74 MG/DL (ref 0.6–1.3)
EOSINOPHIL # BLD MANUAL: 0.17 THOUSAND/UL (ref 0–0.4)
EOSINOPHIL NFR BLD MANUAL: 1 % (ref 0–6)
ERYTHROCYTE [DISTWIDTH] IN BLOOD BY AUTOMATED COUNT: 13.5 % (ref 11.6–15.1)
GFR SERPL CREATININE-BSD FRML MDRD: 80 ML/MIN/1.73SQ M
GLUCOSE SERPL-MCNC: 102 MG/DL (ref 65–140)
GLUCOSE UR STRIP-MCNC: NEGATIVE MG/DL
HCT VFR BLD AUTO: 31 % (ref 34.8–46.1)
HGB BLD-MCNC: 9 G/DL (ref 11.5–15.4)
HGB UR QL STRIP.AUTO: NEGATIVE
KETONES UR STRIP-MCNC: NEGATIVE MG/DL
LACTATE SERPL-SCNC: 0.4 MMOL/L (ref 0.5–2)
LEUKOCYTE ESTERASE UR QL STRIP: NEGATIVE
LIPASE SERPL-CCNC: 69 U/L (ref 73–393)
LYMPHOCYTES # BLD AUTO: 14.86 THOUSAND/UL (ref 0.6–4.47)
LYMPHOCYTES # BLD AUTO: 85 % (ref 14–44)
MCH RBC QN AUTO: 28.7 PG (ref 26.8–34.3)
MCHC RBC AUTO-ENTMCNC: 29 G/DL (ref 31.4–37.4)
MCV RBC AUTO: 99 FL (ref 82–98)
MONOCYTES # BLD AUTO: 0.17 THOUSAND/UL (ref 0–1.22)
MONOCYTES NFR BLD: 1 % (ref 4–12)
NEUTROPHILS # BLD MANUAL: 1.22 THOUSAND/UL (ref 1.85–7.62)
NEUTS SEG NFR BLD AUTO: 7 % (ref 43–75)
NITRITE UR QL STRIP: NEGATIVE
NRBC BLD AUTO-RTO: 0 /100 WBCS
PH UR STRIP.AUTO: 6.5 [PH]
PLATELET # BLD AUTO: 147 THOUSANDS/UL (ref 149–390)
PLATELET BLD QL SMEAR: ADEQUATE
PMV BLD AUTO: 9.1 FL (ref 8.9–12.7)
POTASSIUM SERPL-SCNC: 3.7 MMOL/L (ref 3.5–5.3)
PROT SERPL-MCNC: 7.2 G/DL (ref 6.4–8.2)
PROT UR STRIP-MCNC: NEGATIVE MG/DL
RBC # BLD AUTO: 3.14 MILLION/UL (ref 3.81–5.12)
SODIUM SERPL-SCNC: 143 MMOL/L (ref 136–145)
SP GR UR STRIP.AUTO: 1.01 (ref 1–1.03)
TOTAL CELLS COUNTED SPEC: 100
TROPONIN I SERPL-MCNC: <0.02 NG/ML
UROBILINOGEN UR QL STRIP.AUTO: 0.2 E.U./DL
VARIANT LYMPHS # BLD AUTO: 6 %
WBC # BLD AUTO: 17.48 THOUSAND/UL (ref 4.31–10.16)

## 2021-03-30 PROCEDURE — 84484 ASSAY OF TROPONIN QUANT: CPT | Performed by: PHYSICIAN ASSISTANT

## 2021-03-30 PROCEDURE — 71260 CT THORAX DX C+: CPT

## 2021-03-30 PROCEDURE — 74177 CT ABD & PELVIS W/CONTRAST: CPT

## 2021-03-30 PROCEDURE — 85027 COMPLETE CBC AUTOMATED: CPT | Performed by: PHYSICIAN ASSISTANT

## 2021-03-30 PROCEDURE — 99285 EMERGENCY DEPT VISIT HI MDM: CPT | Performed by: PHYSICIAN ASSISTANT

## 2021-03-30 PROCEDURE — 96361 HYDRATE IV INFUSION ADD-ON: CPT

## 2021-03-30 PROCEDURE — 93005 ELECTROCARDIOGRAM TRACING: CPT

## 2021-03-30 PROCEDURE — 96360 HYDRATION IV INFUSION INIT: CPT

## 2021-03-30 PROCEDURE — 36415 COLL VENOUS BLD VENIPUNCTURE: CPT | Performed by: PHYSICIAN ASSISTANT

## 2021-03-30 PROCEDURE — 81003 URINALYSIS AUTO W/O SCOPE: CPT | Performed by: PHYSICIAN ASSISTANT

## 2021-03-30 PROCEDURE — 99284 EMERGENCY DEPT VISIT MOD MDM: CPT

## 2021-03-30 PROCEDURE — 85007 BL SMEAR W/DIFF WBC COUNT: CPT | Performed by: PHYSICIAN ASSISTANT

## 2021-03-30 PROCEDURE — 83690 ASSAY OF LIPASE: CPT | Performed by: PHYSICIAN ASSISTANT

## 2021-03-30 PROCEDURE — 80053 COMPREHEN METABOLIC PANEL: CPT | Performed by: PHYSICIAN ASSISTANT

## 2021-03-30 PROCEDURE — 83605 ASSAY OF LACTIC ACID: CPT | Performed by: PHYSICIAN ASSISTANT

## 2021-03-30 RX ORDER — FERROUS SULFATE 325(65) MG
325 TABLET ORAL
COMMUNITY

## 2021-03-30 RX ORDER — HYDROCODONE BITARTRATE AND ACETAMINOPHEN 5; 325 MG/1; MG/1
1 TABLET ORAL EVERY 6 HOURS PRN
COMMUNITY

## 2021-03-30 RX ORDER — NITROGLYCERIN 0.4 MG/1
0.4 TABLET SUBLINGUAL
COMMUNITY

## 2021-03-30 RX ORDER — LOPERAMIDE HYDROCHLORIDE 2 MG/1
2 CAPSULE ORAL AS NEEDED
COMMUNITY
End: 2021-08-30 | Stop reason: HOSPADM

## 2021-03-30 RX ADMIN — IOHEXOL 100 ML: 350 INJECTION, SOLUTION INTRAVENOUS at 21:14

## 2021-03-30 RX ADMIN — SODIUM CHLORIDE 1000 ML: 0.9 INJECTION, SOLUTION INTRAVENOUS at 20:26

## 2021-03-31 LAB
ATRIAL RATE: 77 BPM
P AXIS: 82 DEGREES
PR INTERVAL: 148 MS
QRS AXIS: 89 DEGREES
QRSD INTERVAL: 78 MS
QT INTERVAL: 392 MS
QTC INTERVAL: 443 MS
T WAVE AXIS: 83 DEGREES
VENTRICULAR RATE: 77 BPM

## 2021-03-31 PROCEDURE — 93010 ELECTROCARDIOGRAM REPORT: CPT | Performed by: INTERNAL MEDICINE

## 2021-03-31 NOTE — ED PROVIDER NOTES
History  Chief Complaint   Patient presents with    Fatigue     Pt  presents to ER via EMS with c/o weakness and wt  loss of 60lbs in the past 6 months  Patient is a 80-year-old female presents emergency department with complaints of generalized fatigue and weakness  Patient states that she has lost 60 lb in the last 6 months  She states that she has decreased appetite  She denies any nausea or vomiting  Patient denies any diarrhea, bloody stool  She denies any abdominal pain, fever, chills, chest pain  Patient states that she is chronically short of breath secondary to emphysema  Patient states that she does smoke  Patient is on 4 L of oxygen at home  Patient's son states that she does not eat that much  He states that he is constantly encouraging her to eat high-protein and high-calorie diet to help increase her weight  He states that most the time she will resist   Patient's son states that she does have depression  Prior to Admission Medications   Prescriptions Last Dose Informant Patient Reported? Taking? Calcium-Magnesium-Vitamin D (CALCIUM 500 PO) Not Taking at Unknown time  Yes No   Sig: Take 1,000 mg by mouth daily   HYDROcodone-acetaminophen (NORCO) 5-325 mg per tablet Past Week at Unknown time  Yes Yes   Sig: Take 1 tablet by mouth every 6 (six) hours as needed for pain   Potassium 99 MG TABS Not Taking at Unknown time  Yes No   Sig: Take 99 mg by mouth   albuterol (VENTOLIN HFA) 90 mcg/act inhaler More than a month at Unknown time  No No   Sig: Inhale 2 puffs every 6 (six) hours as needed for wheezing   alendronate (FOSAMAX) 35 mg tablet Not Taking at Unknown time  Yes No   Sig: Take 35 mg by mouth every 7 days Patient takes this med every Sunday     amLODIPine (NORVASC) 5 mg tablet Not Taking at Unknown time  No No   Sig: Take 1 tablet (5 mg total) by mouth daily   Patient not taking: Reported on 3/30/2021   ascorbic acid (VITAMIN C) 500 mg tablet Not Taking at Unknown time Yes No   Sig: Take 500 mg by mouth daily   budesonide-formoterol (SYMBICORT) 160-4 5 mcg/act inhaler Not Taking at Unknown time  No No   Sig: Inhale 2 puffs 2 (two) times a day Rinse mouth after use     Patient not taking: Reported on 3/30/2021   carvedilol (COREG) 25 mg tablet 3/30/2021 at Unknown time  No Yes   Sig: Take 1 tablet (25 mg total) by mouth 2 (two) times a day with meals   cholecalciferol (VITAMIN D3) 1,000 units tablet Not Taking at Unknown time  No No   Sig: Take 1 tablet (1,000 Units total) by mouth daily   Patient not taking: Reported on 3/30/2021   enalapril (VASOTEC) 5 mg tablet 3/30/2021 at Unknown time  No Yes   Sig: Take 1 tablet (5 mg total) by mouth 2 (two) times a day   escitalopram (LEXAPRO) 10 mg tablet 3/30/2021 at Unknown time  No Yes   Sig: Take 1 tablet (10 mg total) by mouth daily   Patient taking differently: Take 20 mg by mouth daily    ferrous sulfate 325 (65 Fe) mg tablet 3/30/2021 at Unknown time  Yes Yes   Sig: Take 325 mg by mouth daily with breakfast   guaiFENesin (MUCINEX) 600 mg 12 hr tablet Not Taking at Unknown time  No No   Sig: Take 1 tablet (600 mg total) by mouth 2 (two) times a day   Patient not taking: Reported on 3/30/2021   hydrochlorothiazide (HYDRODIURIL) 25 mg tablet Not Taking at Unknown time  No No   Sig: Take 1 tablet (25 mg total) by mouth daily   Patient not taking: Reported on 3/30/2021   levalbuterol (XOPENEX) 1 25 mg/0 5 mL nebulizer solution Past Month at Unknown time  No Yes   Sig: Take 0 5 mL (1 25 mg total) by nebulization 3 (three) times a day   lidocaine (LIDODERM) 5 % Not Taking at Unknown time  No No   Sig: Apply 1 patch topically daily Remove & Discard patch within 12 hours or as directed by MD   Patient not taking: Reported on 3/30/2021   loperamide (IMODIUM) 2 mg capsule Past Week at Unknown time  Yes Yes   Sig: Take 2 mg by mouth as needed for diarrhea   melatonin 3 mg Not Taking at Unknown time  No No   Sig: Take 1 tablet (3 mg total) by mouth daily at bedtime as needed (insomnia)   Patient not taking: Reported on 3/30/2021   methocarbamol (ROBAXIN) 500 mg tablet Not Taking at Unknown time  No No   Sig: Take 1 tablet (500 mg total) by mouth every 6 (six) hours as needed for muscle spasms   Patient not taking: Reported on 3/30/2021   nitroglycerin (NITROSTAT) 0 4 mg SL tablet   Yes Yes   Sig: Place 0 4 mg under the tongue every 5 (five) minutes as needed for chest pain   prasugrel (EFFIENT) tablet Not Taking at Unknown time  Yes No   Sig: Take 10 mg by mouth   predniSONE 20 mg tablet Not Taking at Unknown time  No No   Sig: Take 1 tablet (20 mg total) by mouth daily   Patient not taking: Reported on 3/30/2021   sodium chloride 0 9 % nebulizer solution Past Month at Unknown time  No Yes   Sig: Take 3 mL by nebulization 3 (three) times a day   traZODone (DESYREL) 50 mg tablet Not Taking at Unknown time  No No   Sig: Take 1 tablet (50 mg total) by mouth daily at bedtime as needed for sleep   Patient not taking: Reported on 3/30/2021      Facility-Administered Medications: None       Past Medical History:   Diagnosis Date    Anxiety     Cardiac disease     Chronic pain disorder     COPD (chronic obstructive pulmonary disease) (HCC)     Depression     Heart disease     Hyperlipidemia     Hypertension     [facility]     MRSA (methicillin resistant Staphylococcus aureus)     Renal disorder     benign kidney tumor       Past Surgical History:   Procedure Laterality Date    APPENDECTOMY      ELBOW BURSA SURGERY Left 02/2018    D/T MRSA INFECTION    SPINAL FUSION      L7 L9       Family History   Problem Relation Age of Onset    Cancer Father      I have reviewed and agree with the history as documented      E-Cigarette/Vaping     E-Cigarette/Vaping Substances     Social History     Tobacco Use    Smoking status: Former Smoker     Packs/day: 0 25     Years: 0 00     Pack years: 0 00     Types: Cigarettes     Quit date: 2020     Years since quittin 5    Smokeless tobacco: Never Used   Substance Use Topics    Alcohol use: Never     Frequency: Never    Drug use: No       Review of Systems   Constitutional: Positive for fatigue  Negative for fever  Respiratory: Negative for shortness of breath  Cardiovascular: Negative for chest pain  Gastrointestinal: Negative for abdominal pain  Neurological: Positive for weakness  All other systems reviewed and are negative  Physical Exam  Physical Exam  Vitals signs reviewed  Constitutional:       Comments: Cachectic   HENT:      Head: Normocephalic and atraumatic  Right Ear: External ear normal       Left Ear: External ear normal       Nose: Nose normal       Mouth/Throat:      Mouth: Mucous membranes are moist    Eyes:      Extraocular Movements: Extraocular movements intact  Conjunctiva/sclera: Conjunctivae normal       Pupils: Pupils are equal, round, and reactive to light  Neck:      Musculoskeletal: Normal range of motion  Cardiovascular:      Rate and Rhythm: Normal rate and regular rhythm  Pulses: Normal pulses  Heart sounds: Normal heart sounds  Pulmonary:      Effort: Pulmonary effort is normal       Breath sounds: Normal breath sounds  Abdominal:      General: Bowel sounds are normal       Palpations: Abdomen is soft  Tenderness: There is no abdominal tenderness  Skin:     General: Skin is warm  Capillary Refill: Capillary refill takes less than 2 seconds  Neurological:      General: No focal deficit present  Mental Status: She is alert and oriented to person, place, and time  Cranial Nerves: No cranial nerve deficit  Psychiatric:         Mood and Affect: Mood normal          Thought Content:  Thought content normal          Judgment: Judgment normal          Vital Signs  ED Triage Vitals   Temperature Pulse Respirations Blood Pressure SpO2   21 03/1945   97 8 °F (36 6 °C) 79 16 132/76 100 %      Temp Source Heart Rate Source Patient Position - Orthostatic VS BP Location FiO2 (%)   03/30/21 2203 03/1945 03[birth year] --   Oral Monitor Sitting Right arm       Pain Score       03/1945       No Pain           Vitals:    03/1945 03/30/21 2200   BP: 132/76 166/78   Pulse: 79 80   Patient Position - Orthostatic VS: Sitting Lying         Visual Acuity      ED Medications  Medications   sodium chloride 0 9 % bolus 1,000 mL (0 mL Intravenous Stopped 3/30/21 2307)   iohexol (OMNIPAQUE) 350 MG/ML injection (SINGLE-DOSE) 100 mL (100 mL Intravenous Given 3/30/21 2114)       Diagnostic Studies  Results Reviewed     Procedure Component Value Units Date/Time    UA w Reflex to Microscopic w Reflex to Culture [[id number]] Collected: 03/30/21 2218    Lab Status: Final result Specimen: Urine, Other Updated: 03/30/21 2225     Color, UA Yellow     Clarity, UA Clear     Specific Gravity, UA 1 010     pH, UA 6 5     Leukocytes, UA Negative     Nitrite, UA Negative     Protein, UA Negative mg/dl      Glucose, UA Negative mg/dl      Ketones, UA Negative mg/dl      Urobilinogen, UA 0 2 E U /dl      Bilirubin, UA Negative     Blood, UA Negative    CBC and differential [[id number]]  (Abnormal) Collected: 03/30/21 2007    Lab Status: Final result Specimen: Blood from Arm, Left Updated: 03/30/21 2128     WBC 17 48 Thousand/uL      RBC 3 14 Million/uL      Hemoglobin 9 0 g/dL      Hematocrit 31 0 %      MCV 99 fL      MCH 28 7 pg      MCHC 29 0 g/dL      RDW 13 5 %      MPV 9 1 fL      Platelets 779 Thousands/uL      nRBC 0 /100 WBCs     Manual Differential(PHLEBS Do Not Order) [[id number]]  (Abnormal) Collected: 03/30/21 2007    Lab Status: Final result Specimen: Blood from Arm, Left Updated: 03/30/21 2128     Segmented % 7 %      Lymphocytes % 85 %      Monocytes % 1 %      Eosinophils, % 1 %      Basophils % 0 %      Atypical Lymphocytes % 6 % Absolute Neutrophils 1 22 Thousand/uL      Lymphocytes Absolute 14 86 Thousand/uL      Monocytes Absolute 0 17 Thousand/uL      Eosinophils Absolute 0 17 Thousand/uL      Basophils Absolute 0 00 Thousand/uL      Total Counted 100     Platelet Estimate Adequate    Comprehensive metabolic panel [[id number]]  (Abnormal) Collected: 03/30/21 2007    Lab Status: Final result Specimen: Blood from Arm, Left Updated: 03/30/21 2105     Sodium 143 mmol/L      Potassium 3 7 mmol/L      Chloride 102 mmol/L      CO2 43 mmol/L      ANION GAP -2 mmol/L      BUN 12 mg/dL      Creatinine 0 74 mg/dL      Glucose 102 mg/dL      Calcium 9 1 mg/dL      Corrected Calcium 9 9 mg/dL      AST 11 U/L      ALT 12 U/L      Alkaline Phosphatase 86 U/L      Total Protein 7 2 g/dL      Albumin 3 0 g/dL      Total Bilirubin 0 47 mg/dL      eGFR 80 ml/min/1 73sq m     Narrative:      [facility] guidelines for Chronic Kidney Disease (CKD):     Stage 1 with normal or high GFR (GFR > 90 mL/min/1 73 square meters)    Stage 2 Mild CKD (GFR = 60-89 mL/min/1 73 square meters)    Stage 3A Moderate CKD (GFR = 45-59 mL/min/1 73 square meters)    Stage 3B Moderate CKD (GFR = 30-44 mL/min/1 73 square meters)    Stage 4 Severe CKD (GFR = 15-29 mL/min/1 73 square meters)    Stage 5 End Stage CKD (GFR <15 mL/min/1 73 square meters)  Note: GFR calculation is accurate only with a steady state creatinine    Lipase [[id number]]  (Abnormal) Collected: 03/30/21 2007    Lab Status: Final result Specimen: Blood from Arm, Left Updated: 03/30/21 2105     Lipase 69 u/L     Lactic acid, plasma [[id number]]  (Abnormal) Collected: 03/30/21 2007    Lab Status: Final result Specimen: Blood from Arm, Left Updated: 03/30/21 2053     LACTIC ACID 0 4 mmol/L     Narrative:      Result may be elevated if tourniquet was used during collection      Troponin I [[id number]]  (Normal) Collected: 03/30/21 2007    Lab Status: Final result Specimen: Blood from Arm, Left Updated: 03/30/21 2053     Troponin I <0 02 ng/mL                  CT chest abdomen pelvis w contrast   Final Result by Bria Alfaro MD (03/30 2248)      Hepatosplenomegaly and periportal edema  Scattered ill-defined low attenuating splenic lesions  Given nonspecific findings, further workup for systemic illness/malignancy is recommended  Cachexia  Mild centrilobular emphysema  Scattered mild tree-in-bud nodularity, may reflect infectious versus inflammatory bronchiolitis in the appropriate clinical setting  Findings discussed with Dr Gera Galvez at 10:25 PM on 3/30/2021 with verbal confirmation by the recipient  Workstation performed: [id number]                    Procedures  ECG 12 Lead Documentation Only    Date/Time: 3/30/2021 11:00 PM  Performed by: Anh Carrasco PA-C  Authorized by: Anh Carrasco PA-C     Indications / Diagnosis:  Weakness  Patient location:  ED  Previous ECG:     Previous ECG:  Compared to current    Similarity:  No change  Interpretation:     Interpretation: abnormal    Rate:     ECG rate:  77    ECG rate assessment: normal    Rhythm:     Rhythm: sinus rhythm    ST segments:     ST segments:  Non-specific             ED Course               Identification of Seniors at Risk      Most Recent Value   (ISAR) Identification of Seniors at Risk   Before the illness or injury that brought you to the Emergency, did you need someone to help you on a regular basis? 1 Filed at: 03/30/2021 2000   In the last 24 hours, have you needed more help than usual?  1 Filed at: 03/30/2021 2000   Have you been hospitalized for one or more nights during the past 6 months? 0 Filed at: 03/30/2021 2000   In general, do you see well?  0 Filed at: 03/30/2021 2000   In general, do you have serious problems with your memory? 0 Filed at: 03/30/2021 2000   Do you take more than three different medications every day?   1 Filed at: 03/30/2021 2000   ISAR Score  3 Filed at: 03/30/2021 2000                    SBIRT 22yo+      Most Recent Value   SBIRT (24 yo +)   In order to provide better care to our patients, we are screening all of our patients for alcohol and drug use  Would it be okay to ask you these screening questions? Yes Filed at: 03/30/2021 2003   Initial Alcohol Screen: US AUDIT-C    1  How often do you have a drink containing alcohol?  0 Filed at: 03/30/2021 2003   2  How many drinks containing alcohol do you have on a typical day you are drinking? 0 Filed at: 03/30/2021 2003   3b  FEMALE Any Age, or MALE 65+: How often do you have 4 or more drinks on one occassion? 0 Filed at: 03/30/2021 2003   Audit-C Score  0 Filed at: 03/30/2021 2003   NIA: How many times in the past year have you    Used an illegal drug or used a prescription medication for non-medical reasons? Never Filed at: 03/30/2021 2003                    MDM  Number of Diagnoses or Management Options  Fatigue:   Diagnosis management comments: Patient is a 67-year-old female presents emergency department with complaints of generalized fatigue and weakness  Patient states that she has lost 60 lb in the last 6 months  She states that she has decreased appetite  She denies any nausea or vomiting  Patient denies any diarrhea, bloody stool  She denies any abdominal pain, fever, chills, chest pain  Patient states that she is chronically short of breath secondary to emphysema  Patient states that she does smoke  Patient is on 4 L of oxygen at home  Patient's son states that she does not eat that much  He states that he is constantly encouraging her to eat high-protein and high-calorie diet to help increase her weight  He states that most the time she will resist   Patient's son states that she does have depression  On examination, patient is a cachectic  Remainder of physical exam is unremarkable  Lab evaluations performed does show an elevated white count  Remainder lab evaluations unremarkable    Patient has no known source of infection  In patient has had elevated white counts in the past   EKG is unremarkable  CT of chest abdomen pelvis was obtained and does show evidence of emphysema  Patient has known emphysema  Patient also has hepatosplenomegaly noted on CT scan  Findings were discussed patient her son  I recommend close follow-up with her primary care physician for continued monitoring and evaluation  Return parameters were discussed  Patient stable for discharge  Amount and/or Complexity of Data Reviewed  Clinical lab tests: ordered and reviewed  Tests in the radiology section of CPT®: ordered and reviewed  Review and summarize past medical records: yes  Independent visualization of images, tracings, or specimens: yes    Risk of Complications, Morbidity, and/or Mortality  Presenting problems: moderate  Diagnostic procedures: moderate  Management options: moderate    Patient Progress  Patient progress: stable      Disposition  Final diagnoses:   Fatigue     Time reflects when diagnosis was documented in both MDM as applicable and the Disposition within this note     Time User Action Codes Description Comment    3/30/2021 10:59 PM Tiny Room Add [R53 83] Fatigue       ED Disposition     ED Disposition Condition Date/Time Comment    Discharge Good Tue Mar 30, 2021 [address] discharge to home/self care              Follow-up Information     Follow up With Specialties Details Why Contact Info Additional Information    Mario Varela MD Internal Medicine Schedule an appointment as soon as possible for a visit   [address] [place] [address] [phone]       [facility] Emergency Department Emergency Medicine  If symptoms worsen [facility] 109 [facility] Emergency Department, [address] [place] [address], [address] [place] [address], [id number]          Discharge Medication List as of 3/30/2021 10:59 PM      CONTINUE these medications which have NOT CHANGED    Details   carvedilol (COREG) 25 mg tablet Take 1 tablet (25 mg total) by mouth 2 (two) times a day with meals, Starting Tue 1/7/2020, Print      enalapril (VASOTEC) 5 mg tablet Take 1 tablet (5 mg total) by mouth 2 (two) times a day, Starting Wed 1/8/2020, Print      escitalopram (LEXAPRO) 10 mg tablet Take 1 tablet (10 mg total) by mouth daily, Starting Wed 1/8/2020, Normal      ferrous sulfate 325 (65 Fe) mg tablet Take 325 mg by mouth daily with breakfast, Historical Med      HYDROcodone-acetaminophen (NORCO) 5-325 mg per tablet Take 1 tablet by mouth every 6 (six) hours as needed for pain, Historical Med      levalbuterol (XOPENEX) 1 25 mg/0 5 mL nebulizer solution Take 0 5 mL (1 25 mg total) by nebulization 3 (three) times a day, Starting Sun 12/23/2018, No Print      loperamide (IMODIUM) 2 mg capsule Take 2 mg by mouth as needed for diarrhea, Historical Med      nitroglycerin (NITROSTAT) 0 4 mg SL tablet Place 0 4 mg under the tongue every 5 (five) minutes as needed for chest pain, Historical Med      sodium chloride 0 9 % nebulizer solution Take 3 mL by nebulization 3 (three) times a day, Starting Sun 12/23/2018, No Print      albuterol (VENTOLIN HFA) 90 mcg/act inhaler Inhale 2 puffs every 6 (six) hours as needed for wheezing, Starting Tue 1/7/2020, Print      alendronate (FOSAMAX) 35 mg tablet Take 35 mg by mouth every 7 days Patient takes this med every Sunday , Historical Med      amLODIPine (NORVASC) 5 mg tablet Take 1 tablet (5 mg total) by mouth daily, Starting Tue 1/7/2020, Print      ascorbic acid (VITAMIN C) 500 mg tablet Take 500 mg by mouth daily, Historical Med      budesonide-formoterol (SYMBICORT) 160-4 5 mcg/act inhaler Inhale 2 puffs 2 (two) times a day Rinse mouth after use , Starting Tue 1/7/2020, Print      Calcium-Magnesium-Vitamin D (CALCIUM 500 PO) Take 1,000 mg by mouth daily, Historical Med      cholecalciferol (VITAMIN D3) 1,000 units tablet Take 1 tablet (1,000 Units total) by mouth daily, Starting Wed 1/8/2020, Print      guaiFENesin (MUCINEX) 600 mg 12 hr tablet Take 1 tablet (600 mg total) by mouth 2 (two) times a day, Starting Sun 12/23/2018, Normal      hydrochlorothiazide (HYDRODIURIL) 25 mg tablet Take 1 tablet (25 mg total) by mouth daily, Starting Tue 1/7/2020, Print      lidocaine (LIDODERM) 5 % Apply 1 patch topically daily Remove & Discard patch within 12 hours or as directed by MD, Starting Tue 1/7/2020, Print      melatonin 3 mg Take 1 tablet (3 mg total) by mouth daily at bedtime as needed (insomnia), Starting Tue 1/7/2020, Normal      methocarbamol (ROBAXIN) 500 mg tablet Take 1 tablet (500 mg total) by mouth every 6 (six) hours as needed for muscle spasms, Starting Sun 12/23/2018, No Print      Potassium 99 MG TABS Take 99 mg by mouth, Historical Med      prasugrel (EFFIENT) tablet Take 10 mg by mouth, Historical Med      predniSONE 20 mg tablet Take 1 tablet (20 mg total) by mouth daily, Starting Wed 1/8/2020, Print      traZODone (DESYREL) 50 mg tablet Take 1 tablet (50 mg total) by mouth daily at bedtime as needed for sleep, Starting Tue 1/7/2020, Normal           No discharge procedures on file      PDMP Review     None          ED Provider  Electronically Signed by           Bucky Marinelli PA-C  03/31/21 2566

## 2021-08-26 ENCOUNTER — APPOINTMENT (EMERGENCY)
Dept: CT IMAGING | Facility: HOSPITAL | Age: 76
DRG: 291 | End: 2021-08-26
Payer: MEDICARE

## 2021-08-26 ENCOUNTER — HOSPITAL ENCOUNTER (INPATIENT)
Facility: HOSPITAL | Age: 76
LOS: 3 days | Discharge: HOME | DRG: 291 | End: 2021-08-30
Attending: EMERGENCY MEDICINE | Admitting: FAMILY MEDICINE
Payer: MEDICARE

## 2021-08-26 ENCOUNTER — APPOINTMENT (EMERGENCY)
Dept: RADIOLOGY | Facility: HOSPITAL | Age: 76
DRG: 291 | End: 2021-08-26
Payer: MEDICARE

## 2021-08-26 DIAGNOSIS — F33.1 MAJOR DEPRESSIVE DISORDER, RECURRENT EPISODE, MODERATE (HCC): ICD-10-CM

## 2021-08-26 DIAGNOSIS — I50.9 CHF (CONGESTIVE HEART FAILURE) (HCC): Primary | ICD-10-CM

## 2021-08-26 DIAGNOSIS — J44.9 COPD (CHRONIC OBSTRUCTIVE PULMONARY DISEASE) (HCC): ICD-10-CM

## 2021-08-26 DIAGNOSIS — R93.89 ABNORMAL COMPUTED TOMOGRAPHY ANGIOGRAPHY (CTA): ICD-10-CM

## 2021-08-26 LAB
ALBUMIN SERPL BCP-MCNC: 2.6 G/DL (ref 3.5–5)
ALP SERPL-CCNC: 130 U/L (ref 46–116)
ALT SERPL W P-5'-P-CCNC: 10 U/L (ref 12–78)
ANION GAP SERPL CALCULATED.3IONS-SCNC: 3 MMOL/L (ref 4–13)
APTT PPP: 34 SECONDS (ref 23–37)
AST SERPL W P-5'-P-CCNC: 13 U/L (ref 5–45)
BASOPHILS # BLD MANUAL: 0 THOUSAND/UL (ref 0–0.1)
BASOPHILS NFR MAR MANUAL: 0 % (ref 0–1)
BILIRUB SERPL-MCNC: 0.32 MG/DL (ref 0.2–1)
BUN SERPL-MCNC: 15 MG/DL (ref 5–25)
CALCIUM ALBUM COR SERPL-MCNC: 9.8 MG/DL (ref 8.3–10.1)
CALCIUM SERPL-MCNC: 8.7 MG/DL (ref 8.3–10.1)
CHLORIDE SERPL-SCNC: 102 MMOL/L (ref 100–108)
CO2 SERPL-SCNC: 38 MMOL/L (ref 21–32)
CREAT SERPL-MCNC: 0.7 MG/DL (ref 0.6–1.3)
EOSINOPHIL # BLD MANUAL: 0.26 THOUSAND/UL (ref 0–0.4)
EOSINOPHIL NFR BLD MANUAL: 1 % (ref 0–6)
ERYTHROCYTE [DISTWIDTH] IN BLOOD BY AUTOMATED COUNT: 13.7 % (ref 11.6–15.1)
GFR SERPL CREATININE-BSD FRML MDRD: 86 ML/MIN/1.73SQ M
GLUCOSE SERPL-MCNC: 114 MG/DL (ref 65–140)
HCT VFR BLD AUTO: 28.4 % (ref 34.8–46.1)
HGB BLD-MCNC: 8.2 G/DL (ref 11.5–15.4)
INR PPP: 1.09 (ref 0.84–1.19)
LYMPHOCYTES # BLD AUTO: 17.55 THOUSAND/UL (ref 0.6–4.47)
LYMPHOCYTES # BLD AUTO: 67 % (ref 14–44)
MAGNESIUM SERPL-MCNC: 1.7 MG/DL (ref 1.6–2.6)
MCH RBC QN AUTO: 29.1 PG (ref 26.8–34.3)
MCHC RBC AUTO-ENTMCNC: 28.9 G/DL (ref 31.4–37.4)
MCV RBC AUTO: 101 FL (ref 82–98)
MONOCYTES # BLD AUTO: 1.57 THOUSAND/UL (ref 0–1.22)
MONOCYTES NFR BLD: 6 % (ref 4–12)
NEUTROPHILS # BLD MANUAL: 6.81 THOUSAND/UL (ref 1.85–7.62)
NEUTS BAND NFR BLD MANUAL: 2 % (ref 0–8)
NEUTS SEG NFR BLD AUTO: 24 % (ref 43–75)
NT-PROBNP SERPL-MCNC: 1728 PG/ML
PLATELET # BLD AUTO: 188 THOUSANDS/UL (ref 149–390)
PLATELET BLD QL SMEAR: ADEQUATE
PMV BLD AUTO: 9.1 FL (ref 8.9–12.7)
POTASSIUM SERPL-SCNC: 3.7 MMOL/L (ref 3.5–5.3)
PROT SERPL-MCNC: 7.4 G/DL (ref 6.4–8.2)
PROTHROMBIN TIME: 14.3 SECONDS (ref 11.6–14.5)
RBC # BLD AUTO: 2.82 MILLION/UL (ref 3.81–5.12)
RBC MORPH BLD: NORMAL
SODIUM SERPL-SCNC: 143 MMOL/L (ref 136–145)
TROPONIN I SERPL-MCNC: <0.02 NG/ML
WBC # BLD AUTO: 26.2 THOUSAND/UL (ref 4.31–10.16)

## 2021-08-26 PROCEDURE — 85007 BL SMEAR W/DIFF WBC COUNT: CPT | Performed by: EMERGENCY MEDICINE

## 2021-08-26 PROCEDURE — 85027 COMPLETE CBC AUTOMATED: CPT | Performed by: EMERGENCY MEDICINE

## 2021-08-26 PROCEDURE — 96361 HYDRATE IV INFUSION ADD-ON: CPT

## 2021-08-26 PROCEDURE — 83880 ASSAY OF NATRIURETIC PEPTIDE: CPT | Performed by: EMERGENCY MEDICINE

## 2021-08-26 PROCEDURE — 99285 EMERGENCY DEPT VISIT HI MDM: CPT | Performed by: EMERGENCY MEDICINE

## 2021-08-26 PROCEDURE — 71275 CT ANGIOGRAPHY CHEST: CPT

## 2021-08-26 PROCEDURE — 1124F ACP DISCUSS-NO DSCNMKR DOCD: CPT | Performed by: EMERGENCY MEDICINE

## 2021-08-26 PROCEDURE — 85730 THROMBOPLASTIN TIME PARTIAL: CPT | Performed by: EMERGENCY MEDICINE

## 2021-08-26 PROCEDURE — 99285 EMERGENCY DEPT VISIT HI MDM: CPT

## 2021-08-26 PROCEDURE — 74174 CTA ABD&PLVS W/CONTRAST: CPT

## 2021-08-26 PROCEDURE — 84484 ASSAY OF TROPONIN QUANT: CPT | Performed by: EMERGENCY MEDICINE

## 2021-08-26 PROCEDURE — 36415 COLL VENOUS BLD VENIPUNCTURE: CPT | Performed by: EMERGENCY MEDICINE

## 2021-08-26 PROCEDURE — 93005 ELECTROCARDIOGRAM TRACING: CPT

## 2021-08-26 PROCEDURE — 83735 ASSAY OF MAGNESIUM: CPT | Performed by: EMERGENCY MEDICINE

## 2021-08-26 PROCEDURE — 71046 X-RAY EXAM CHEST 2 VIEWS: CPT

## 2021-08-26 PROCEDURE — 80053 COMPREHEN METABOLIC PANEL: CPT | Performed by: EMERGENCY MEDICINE

## 2021-08-26 PROCEDURE — 85610 PROTHROMBIN TIME: CPT | Performed by: EMERGENCY MEDICINE

## 2021-08-26 RX ADMIN — IOHEXOL 100 ML: 350 INJECTION, SOLUTION INTRAVENOUS at 23:00

## 2021-08-26 RX ADMIN — SODIUM CHLORIDE 500 ML: 0.9 INJECTION, SOLUTION INTRAVENOUS at 22:13

## 2021-08-27 ENCOUNTER — APPOINTMENT (INPATIENT)
Dept: NON INVASIVE DIAGNOSTICS | Facility: HOSPITAL | Age: 76
DRG: 291 | End: 2021-08-27
Payer: MEDICARE

## 2021-08-27 PROBLEM — I50.9 ACUTE CONGESTIVE HEART FAILURE (HCC): Status: ACTIVE | Noted: 2021-08-27

## 2021-08-27 PROBLEM — D64.9 ANEMIA: Status: ACTIVE | Noted: 2021-08-27

## 2021-08-27 PROBLEM — R93.89 ABNORMAL COMPUTED TOMOGRAPHY ANGIOGRAPHY (CTA): Status: ACTIVE | Noted: 2021-08-27

## 2021-08-27 PROBLEM — R06.02 SHORTNESS OF BREATH: Status: ACTIVE | Noted: 2021-08-27

## 2021-08-27 PROBLEM — R65.10 SIRS (SYSTEMIC INFLAMMATORY RESPONSE SYNDROME) (HCC): Status: ACTIVE | Noted: 2021-08-27

## 2021-08-27 PROBLEM — R07.9 CHEST PAIN: Status: ACTIVE | Noted: 2021-08-27

## 2021-08-27 LAB
ABO GROUP BLD: NORMAL
ATRIAL RATE: 90 BPM
BLD GP AB SCN SERPL QL: NEGATIVE
ERYTHROCYTE [DISTWIDTH] IN BLOOD BY AUTOMATED COUNT: 13.9 % (ref 11.6–15.1)
HCT VFR BLD AUTO: 27.9 % (ref 34.8–46.1)
HGB BLD-MCNC: 8.1 G/DL (ref 11.5–15.4)
LACTATE SERPL-SCNC: 0.6 MMOL/L (ref 0.5–2)
MCH RBC QN AUTO: 30.1 PG (ref 26.8–34.3)
MCHC RBC AUTO-ENTMCNC: 29 G/DL (ref 31.4–37.4)
MCV RBC AUTO: 104 FL (ref 82–98)
P AXIS: 78 DEGREES
PLATELET # BLD AUTO: 183 THOUSANDS/UL (ref 149–390)
PMV BLD AUTO: 8.7 FL (ref 8.9–12.7)
PR INTERVAL: 134 MS
PROCALCITONIN SERPL-MCNC: 0.06 NG/ML
QRS AXIS: 80 DEGREES
QRSD INTERVAL: 82 MS
QT INTERVAL: 358 MS
QTC INTERVAL: 437 MS
RBC # BLD AUTO: 2.69 MILLION/UL (ref 3.81–5.12)
RH BLD: NEGATIVE
SARS-COV-2 RNA RESP QL NAA+PROBE: NEGATIVE
SPECIMEN EXPIRATION DATE: NORMAL
T WAVE AXIS: 83 DEGREES
TROPONIN I SERPL-MCNC: <0.02 NG/ML
TROPONIN I SERPL-MCNC: <0.02 NG/ML
VENTRICULAR RATE: 90 BPM
WBC # BLD AUTO: 20.45 THOUSAND/UL (ref 4.31–10.16)

## 2021-08-27 PROCEDURE — 87040 BLOOD CULTURE FOR BACTERIA: CPT | Performed by: PHYSICIAN ASSISTANT

## 2021-08-27 PROCEDURE — U0003 INFECTIOUS AGENT DETECTION BY NUCLEIC ACID (DNA OR RNA); SEVERE ACUTE RESPIRATORY SYNDROME CORONAVIRUS 2 (SARS-COV-2) (CORONAVIRUS DISEASE [COVID-19]), AMPLIFIED PROBE TECHNIQUE, MAKING USE OF HIGH THROUGHPUT TECHNOLOGIES AS DESCRIBED BY CMS-2020-01-R: HCPCS | Performed by: EMERGENCY MEDICINE

## 2021-08-27 PROCEDURE — U0005 INFEC AGEN DETEC AMPLI PROBE: HCPCS | Performed by: EMERGENCY MEDICINE

## 2021-08-27 PROCEDURE — 99223 1ST HOSP IP/OBS HIGH 75: CPT | Performed by: STUDENT IN AN ORGANIZED HEALTH CARE EDUCATION/TRAINING PROGRAM

## 2021-08-27 PROCEDURE — 93306 TTE W/DOPPLER COMPLETE: CPT | Performed by: INTERNAL MEDICINE

## 2021-08-27 PROCEDURE — 85027 COMPLETE CBC AUTOMATED: CPT | Performed by: PHYSICIAN ASSISTANT

## 2021-08-27 PROCEDURE — 86900 BLOOD TYPING SEROLOGIC ABO: CPT | Performed by: PHYSICIAN ASSISTANT

## 2021-08-27 PROCEDURE — 83605 ASSAY OF LACTIC ACID: CPT | Performed by: PHYSICIAN ASSISTANT

## 2021-08-27 PROCEDURE — 99222 1ST HOSP IP/OBS MODERATE 55: CPT | Performed by: INTERNAL MEDICINE

## 2021-08-27 PROCEDURE — 96361 HYDRATE IV INFUSION ADD-ON: CPT

## 2021-08-27 PROCEDURE — 84145 PROCALCITONIN (PCT): CPT | Performed by: PHYSICIAN ASSISTANT

## 2021-08-27 PROCEDURE — 93010 ELECTROCARDIOGRAM REPORT: CPT | Performed by: INTERNAL MEDICINE

## 2021-08-27 PROCEDURE — 94760 N-INVAS EAR/PLS OXIMETRY 1: CPT

## 2021-08-27 PROCEDURE — 93306 TTE W/DOPPLER COMPLETE: CPT

## 2021-08-27 PROCEDURE — 87077 CULTURE AEROBIC IDENTIFY: CPT | Performed by: PHYSICIAN ASSISTANT

## 2021-08-27 PROCEDURE — 96374 THER/PROPH/DIAG INJ IV PUSH: CPT

## 2021-08-27 PROCEDURE — 94640 AIRWAY INHALATION TREATMENT: CPT

## 2021-08-27 PROCEDURE — 86850 RBC ANTIBODY SCREEN: CPT | Performed by: PHYSICIAN ASSISTANT

## 2021-08-27 PROCEDURE — 86901 BLOOD TYPING SEROLOGIC RH(D): CPT | Performed by: PHYSICIAN ASSISTANT

## 2021-08-27 PROCEDURE — 84484 ASSAY OF TROPONIN QUANT: CPT | Performed by: PHYSICIAN ASSISTANT

## 2021-08-27 PROCEDURE — 36415 COLL VENOUS BLD VENIPUNCTURE: CPT | Performed by: PHYSICIAN ASSISTANT

## 2021-08-27 RX ORDER — PRAVASTATIN SODIUM 20 MG
20 TABLET ORAL
Status: DISCONTINUED | OUTPATIENT
Start: 2021-08-27 | End: 2021-08-30 | Stop reason: HOSPADM

## 2021-08-27 RX ORDER — SODIUM CHLORIDE FOR INHALATION 0.9 %
3 VIAL, NEBULIZER (ML) INHALATION
Status: DISCONTINUED | OUTPATIENT
Start: 2021-08-27 | End: 2021-08-27

## 2021-08-27 RX ORDER — ACETAMINOPHEN 325 MG/1
650 TABLET ORAL EVERY 6 HOURS PRN
Status: DISCONTINUED | OUTPATIENT
Start: 2021-08-27 | End: 2021-08-30 | Stop reason: HOSPADM

## 2021-08-27 RX ORDER — HYDROCODONE BITARTRATE AND ACETAMINOPHEN 5; 325 MG/1; MG/1
1 TABLET ORAL EVERY 8 HOURS PRN
Status: DISCONTINUED | OUTPATIENT
Start: 2021-08-27 | End: 2021-08-30 | Stop reason: HOSPADM

## 2021-08-27 RX ORDER — PRASUGREL 10 MG/1
10 TABLET, FILM COATED ORAL DAILY
Status: CANCELLED | OUTPATIENT
Start: 2021-08-27

## 2021-08-27 RX ORDER — FUROSEMIDE 40 MG/1
40 TABLET ORAL DAILY
Status: DISCONTINUED | OUTPATIENT
Start: 2021-08-28 | End: 2021-08-28

## 2021-08-27 RX ORDER — ASPIRIN 81 MG/1
81 TABLET ORAL DAILY
Status: DISCONTINUED | OUTPATIENT
Start: 2021-08-27 | End: 2021-08-30 | Stop reason: HOSPADM

## 2021-08-27 RX ORDER — VILAZODONE HYDROCHLORIDE 40 MG/1
40 TABLET ORAL
COMMUNITY

## 2021-08-27 RX ORDER — VILAZODONE HYDROCHLORIDE 20 MG/1
40 TABLET ORAL
Status: DISCONTINUED | OUTPATIENT
Start: 2021-08-27 | End: 2021-08-30 | Stop reason: HOSPADM

## 2021-08-27 RX ORDER — ALBUTEROL SULFATE 2.5 MG/3ML
2.5 SOLUTION RESPIRATORY (INHALATION)
Status: DISCONTINUED | OUTPATIENT
Start: 2021-08-27 | End: 2021-08-27

## 2021-08-27 RX ORDER — AZITHROMYCIN 500 MG/1
500 TABLET, FILM COATED ORAL ONCE
Status: COMPLETED | OUTPATIENT
Start: 2021-08-27 | End: 2021-08-27

## 2021-08-27 RX ORDER — LEVALBUTEROL 1.25 MG/.5ML
1.25 SOLUTION, CONCENTRATE RESPIRATORY (INHALATION) EVERY 6 HOURS PRN
Status: DISCONTINUED | OUTPATIENT
Start: 2021-08-27 | End: 2021-08-27

## 2021-08-27 RX ORDER — B-COMPLEX WITH VITAMIN C
1 TABLET ORAL
Status: DISCONTINUED | OUTPATIENT
Start: 2021-08-27 | End: 2021-08-30 | Stop reason: HOSPADM

## 2021-08-27 RX ORDER — CARVEDILOL 6.25 MG/1
6.25 TABLET ORAL 2 TIMES DAILY WITH MEALS
Status: DISCONTINUED | OUTPATIENT
Start: 2021-08-27 | End: 2021-08-30 | Stop reason: HOSPADM

## 2021-08-27 RX ORDER — METHYLPREDNISOLONE SODIUM SUCCINATE 40 MG/ML
40 INJECTION, POWDER, LYOPHILIZED, FOR SOLUTION INTRAMUSCULAR; INTRAVENOUS EVERY 12 HOURS SCHEDULED
Status: DISCONTINUED | OUTPATIENT
Start: 2021-08-27 | End: 2021-08-29

## 2021-08-27 RX ORDER — FUROSEMIDE 10 MG/ML
40 INJECTION INTRAMUSCULAR; INTRAVENOUS ONCE
Status: COMPLETED | OUTPATIENT
Start: 2021-08-27 | End: 2021-08-27

## 2021-08-27 RX ORDER — ALBUTEROL SULFATE 90 UG/1
2 AEROSOL, METERED RESPIRATORY (INHALATION) EVERY 4 HOURS PRN
Status: DISCONTINUED | OUTPATIENT
Start: 2021-08-27 | End: 2021-08-30 | Stop reason: HOSPADM

## 2021-08-27 RX ORDER — CARVEDILOL 12.5 MG/1
25 TABLET ORAL 2 TIMES DAILY WITH MEALS
Status: DISCONTINUED | OUTPATIENT
Start: 2021-08-27 | End: 2021-08-27

## 2021-08-27 RX ORDER — FUROSEMIDE 10 MG/ML
20 INJECTION INTRAMUSCULAR; INTRAVENOUS EVERY 12 HOURS SCHEDULED
Status: DISCONTINUED | OUTPATIENT
Start: 2021-08-27 | End: 2021-08-27

## 2021-08-27 RX ORDER — LEVALBUTEROL 1.25 MG/.5ML
1.25 SOLUTION, CONCENTRATE RESPIRATORY (INHALATION)
Status: DISCONTINUED | OUTPATIENT
Start: 2021-08-27 | End: 2021-08-30 | Stop reason: HOSPADM

## 2021-08-27 RX ORDER — IPRATROPIUM BROMIDE AND ALBUTEROL SULFATE 2.5; .5 MG/3ML; MG/3ML
3 SOLUTION RESPIRATORY (INHALATION)
Status: DISCONTINUED | OUTPATIENT
Start: 2021-08-27 | End: 2021-08-27

## 2021-08-27 RX ORDER — AZITHROMYCIN 250 MG/1
250 TABLET, FILM COATED ORAL EVERY 24 HOURS
Status: DISCONTINUED | OUTPATIENT
Start: 2021-08-28 | End: 2021-08-30 | Stop reason: HOSPADM

## 2021-08-27 RX ADMIN — IPRATROPIUM BROMIDE 0.5 MG: 0.5 SOLUTION RESPIRATORY (INHALATION) at 19:58

## 2021-08-27 RX ADMIN — ALBUTEROL SULFATE 2.5 MG: 2.5 SOLUTION RESPIRATORY (INHALATION) at 09:05

## 2021-08-27 RX ADMIN — HYDROCODONE BITARTRATE AND ACETAMINOPHEN 1 TABLET: 5; 325 TABLET ORAL at 04:18

## 2021-08-27 RX ADMIN — METHYLPREDNISOLONE SODIUM SUCCINATE 40 MG: 40 INJECTION, POWDER, FOR SOLUTION INTRAMUSCULAR; INTRAVENOUS at 23:17

## 2021-08-27 RX ADMIN — ASPIRIN 81 MG: 81 TABLET, COATED ORAL at 09:56

## 2021-08-27 RX ADMIN — PRAVASTATIN SODIUM 20 MG: 20 TABLET ORAL at 17:10

## 2021-08-27 RX ADMIN — FUROSEMIDE 40 MG: 10 INJECTION, SOLUTION INTRAVENOUS at 01:44

## 2021-08-27 RX ADMIN — IPRATROPIUM BROMIDE 0.5 MG: 0.5 SOLUTION RESPIRATORY (INHALATION) at 14:17

## 2021-08-27 RX ADMIN — LEVALBUTEROL HYDROCHLORIDE 1.25 MG: 1.25 SOLUTION, CONCENTRATE RESPIRATORY (INHALATION) at 19:58

## 2021-08-27 RX ADMIN — CARVEDILOL 25 MG: 12.5 TABLET, FILM COATED ORAL at 08:55

## 2021-08-27 RX ADMIN — CEFTRIAXONE SODIUM 1000 MG: 10 INJECTION, POWDER, FOR SOLUTION INTRAVENOUS at 04:18

## 2021-08-27 RX ADMIN — CARVEDILOL 6.25 MG: 6.25 TABLET, FILM COATED ORAL at 17:10

## 2021-08-27 RX ADMIN — VILAZODONE HYDROCHLORIDE 40 MG: 20 TABLET ORAL at 08:55

## 2021-08-27 RX ADMIN — AZITHROMYCIN 500 MG: 500 TABLET, FILM COATED ORAL at 09:57

## 2021-08-27 RX ADMIN — Medication 1 TABLET: at 08:55

## 2021-08-27 RX ADMIN — LEVALBUTEROL HYDROCHLORIDE 1.25 MG: 1.25 SOLUTION, CONCENTRATE RESPIRATORY (INHALATION) at 14:17

## 2021-08-27 NOTE — RESPIRATORY THERAPY NOTE
RT Protocol Note  Ayanna Driscoll 77 y o  female MRN: [id number]  Unit/Bed#: ED 23 Encounter: [id number]    Assessment    Principal Problem:    Acute congestive heart failure (HCC)  Active Problems:    Shortness of breath    Chest pain    SIRS (systemic inflammatory response syndrome) (HCC)    Anemia    Abnormal computed tomography angiography (CTA)      Home Pulmonary Medications:  Incruse, albuterol neb TID; albuterol MDI PRN   Home Devices/Therapy: Home O2    Past Medical History:   Diagnosis Date    Anxiety     Cardiac disease     Chronic pain disorder     [facility] obstructive pulmonary disease) ([facility] )     Depression     Heart disease     Hyperlipidemia     Hypertension     MI (myocardial infarction) ([place] [address] )     MRSA (methicillin resistant Staphylococcus aureus)     Renal disorder     benign kidney tumor     Social History     Socioeconomic History    Marital status:      Spouse name: Not on file    Number of children: 3    Years of education: 15    Highest education level: High school graduate   Occupational History    Occupation:      Employer: [employer]     Comment: retired    Tobacco Use    Smoking status: Former Smoker     Packs/day: 0 25     Years: 0 00     Pack years: 0 00     Types: Cigarettes     Quit date: 2020     Years since quittin 9    Smokeless tobacco: Never Used   Substance and Sexual Activity    Alcohol use: Never    Drug use: No    Sexual activity: Not Currently   Other Topics Concern    Not on file   Social History Narrative    Not on file     Social Determinants of Health     Financial Resource Strain:     Difficulty of Paying Living Expenses:    Food Insecurity:     Worried About Running Out of Food in the Last Year:     Ran Out of Food in the Last Year:    Transportation Needs:     Lack of Transportation (Medical):      Lack of Transportation (Non-Medical):    Physical Activity:     Days of Exercise per Week:     Minutes of Exercise per Session:    Stress:     Feeling of Stress :    Social Connections:     Frequency of Communication with Friends and Family:     Frequency of Social Gatherings with Friends and Family:     Attends Jehovah's witness Services:     Active Member of Clubs or Organizations:     Attends Club or Organization Meetings:     Marital Status:    Intimate Partner Violence:     Fear of Current or Ex-Partner:     Emotionally Abused:     Physically Abused:     Sexually Abused:        Subjective         Objective    Physical Exam:   Assessment Type: Assess only  General Appearance: Awake, Alert  Respiratory Pattern: Normal  R Breath Sounds: Diminished  L Breath Sounds: Coarse  Cough: Non-productive (at this time)    Vitals:  Blood pressure 108/64, pulse 93, temperature 98 2 °F (36 8 °C), temperature source Oral, resp  rate 22, weight 46 9 kg (103 lb 6 3 oz), SpO2 100 %, not currently breastfeeding  Imaging and other studies: I have personally reviewed pertinent reports  Plan    Respiratory Plan: Mild Distress pathway        Resp Comments: Pt appears in no distress at this time and is resting  Pt uses Incruse at home and albuterol neb TID, albuterol MDI prn  Pt states she does not use saline nebulizer  Pt wears 5L NC at home  Will continue with pts home regimen

## 2021-08-27 NOTE — ASSESSMENT & PLAN NOTE
· Hemoglobin currently 8 2, baseline around 9-10  · No active bleeding noted  · Monitor CBC, check type and screen  · Will hold VTE prophylaxis for now

## 2021-08-27 NOTE — H&P
[address] [facility]  H&P- Bishop Gama 1945, 77 y o  female MRN: [id number]  Unit/Bed#: ED 23 Encounter: [id number]  Primary Care Provider: Daniele Petty MD   Date and time admitted to hospital: 8/26/2021  9:22 PM    * Acute congestive heart failure [facility])  Assessment & Plan  Wt Readings from Last 3 Encounters:   08/26/21 46 9 kg (103 lb 6 3 oz)   01/04/20 53 2 kg (117 lb 4 6 oz)   12/27/19 56 7 kg (125 lb)   · BNP elevated at 1728  · CT a revealing pulmonary edema, no clinical signs of volume overload though  · Echocardiogram ordered  · Appreciate cardiology consult  · Intake and output monitoring, daily weights, low-sodium diet with 1500 mL fluid restriction  · Initiated on IV Lasix in ED, will continue IV Lasix 20 mg q 12 hours          Shortness of breath  Assessment & Plan  · CT a revealing clustered pulmonary nodular changes rule out infection versus inflammatory process  · Patient currently saturating 100% on baseline 5 L nasal cannula, will continue along with incentive spirometry  · Appreciate pulmonology consult  · Will add IV ceftriaxone 1 g q d   To rule out pneumonia  · Check procalcitonin    Abnormal computed tomography angiography (CTA)  Assessment & Plan  · CT a negative for aneurysm, dissection, but does reveal mediastinal and hilar adenopathy  · Appreciate pulmonology consult  · Also incidental finding of hepatic lesion and hepatomegaly  · Discussed finding with patient and discussed following up outpatient with PCP for an abdomen MRI    Anemia  Assessment & Plan  · Hemoglobin currently 8 2, baseline around 9-10  · No active bleeding noted  · Monitor CBC, check type and screen  · Will hold VTE prophylaxis for now    SIRS (systemic inflammatory response syndrome) ([facility] [address] )  Assessment & Plan  · Meeting criteria due to tachycardia, tachypnea most likely in setting of pulmonary etiology  · Will start IV antibiotic  · Due to pulmonary edema contraindicated to start IV fluids, BP stable  · Check lactic acid, blood culture x2 pending    Chest pain  Assessment & Plan  · ADRIEN score 2  · EKG revealing normal sinus rhythm  · Initial troponin negative, will trend 2 more times q 3 hours with EKG  · Telemetry      VTE Pharmacologic Prophylaxis: VTE Score: 4 Moderate Risk (Score 3-4) - Pharmacological DVT Prophylaxis Contraindicated  Sequential Compression Devices Ordered  Code Status: Level 1 - Full Code per pt      Anticipated Length of Stay: Patient will be admitted on an inpatient basis with an anticipated length of stay of greater than 2 midnights secondary to See above  Total Time for Visit, including Counseling / Coordination of Care: 70 minutes Greater than 50% of this total time spent on direct patient counseling and coordination of care  Chief Complaint:    Chief Complaint   Patient presents with    Shortness of Breath     Pt reports shortness of breath x 3 days and chest tightness  Pt reports left sided back pain yesterday, pt also has unplanned weight loss of greater than 60 pounds in the past year  History of Present Illness:  Paul Shin is a 77 y o  female with a PMH of CAD, COPD who presents with complaint of increased work of breathing, shortness of breath and pressure-like chest pain over the last week  Reports that has been constant, no worsening over the last week  Denies dizziness, headaches, abdominal pain  Denies any leg swelling       Review of Systems:  Review of Systems   Constitutional: Positive for activity change  Respiratory: Positive for shortness of breath  Cardiovascular: Positive for chest pain  Negative for leg swelling  Gastrointestinal: Negative for abdominal pain and nausea  Neurological: Negative for dizziness and headaches         Past Medical and Surgical History:   Past Medical History:   Diagnosis Date    Anxiety     Cardiac disease     Chronic pain disorder     COPD (chronic obstructive pulmonary disease) (HCC)     Depression  Heart disease     Hyperlipidemia     Hypertension     MI [facility]myocardial infarction) ([facility] [address] )     MRSA (methicillin resistant Staphylococcus aureus)     Renal disorder     benign kidney tumor       Past Surgical History:   Procedure Laterality Date    APPENDECTOMY      ELBOW BURSA SURGERY Left 02/2018    D/T MRSA INFECTION    SPINAL FUSION      L7 L9       Meds/Allergies:  Prior to Admission medications    Medication Sig Start Date End Date Taking? Authorizing Provider   albuterol (VENTOLIN HFA) 90 mcg/act inhaler Inhale 2 puffs every 6 (six) hours as needed for wheezing 1/7/20  Yes Jayant Álvarez PA-C   carvedilol (COREG) 25 mg tablet Take 1 tablet (25 mg total) by mouth 2 (two) times a day with meals 1/7/20  Yes Jayant Álvarez PA-C   enalapril (VASOTEC) 5 mg tablet Take 1 tablet (5 mg total) by mouth 2 (two) times a day 1/8/20  Yes Jayant Álvarez PA-C   nitroglycerin (NITROSTAT) 0 4 mg SL tablet Place 0 4 mg under the tongue every 5 (five) minutes as needed for chest pain   Yes Historical Provider, MD   vilazodone (Viibryd) 40 mg tablet Take 40 mg by mouth daily with breakfast   Yes Historical Provider, MD   alendronate (FOSAMAX) 35 mg tablet Take 35 mg by mouth every 7 days Patient takes this med every Sunday  Patient not taking: Reported on 8/27/2021    Historical Provider, MD   amLODIPine (NORVASC) 5 mg tablet Take 1 tablet (5 mg total) by mouth daily  Patient not taking: Reported on 3/30/2021 1/7/20   Derrick Garcia PA-C   ascorbic acid (VITAMIN C) 500 mg tablet Take 500 mg by mouth daily  Patient not taking: Reported on 8/27/2021    Historical Provider, MD   budesonide-formoterol (SYMBICORT) 160-4 5 mcg/act inhaler Inhale 2 puffs 2 (two) times a day Rinse mouth after use    Patient not taking: Reported on 3/30/2021 1/7/20   Derrick Garcia PA-C   Calcium-Magnesium-Vitamin D (CALCIUM 500 PO) Take 1,000 mg by mouth daily    Historical Provider, MD   cholecalciferol (VITAMIN D3) 1,000 units tablet Take 1 tablet (1,000 Units total) by mouth daily  Patient not taking: Reported on 3/30/2021 1/8/20   Miki Ambrocio PA-C   escitalopram (LEXAPRO) 10 mg tablet Take 1 tablet (10 mg total) by mouth daily  Patient taking differently: Take 20 mg by mouth daily  1/8/20   TAMMIE Sargent   ferrous sulfate 325 (65 Fe) mg tablet Take 325 mg by mouth daily with breakfast  Patient not taking: Reported on 8/27/2021    Historical Provider, MD   guaiFENesin (MUCINEX) 600 mg 12 hr tablet Take 1 tablet (600 mg total) by mouth 2 (two) times a day  Patient not taking: Reported on 3/30/2021 12/23/18   Leigha Carrero MD   hydrochlorothiazide (HYDRODIURIL) 25 mg tablet Take 1 tablet (25 mg total) by mouth daily  Patient not taking: Reported on 3/30/2021 1/7/20   Miki Ambrocio PA-C   HYDROcodone-acetaminophen (NORCO) 5-325 mg per tablet Take 1 tablet by mouth every 6 (six) hours as needed for pain    Historical Provider, MD   levalbuterol (XOPENEX) 1 25 mg/0 5 mL nebulizer solution Take 0 5 mL (1 25 mg total) by nebulization 3 (three) times a day  Patient not taking: Reported on 8/27/2021 12/23/18   Leigha Carrero MD   lidocaine (LIDODERM) 5 % Apply 1 patch topically daily Remove & Discard patch within 12 hours or as directed by MD  Patient not taking: Reported on 3/30/2021 1/7/20   Miki Ambrocio PA-C   loperamide (IMODIUM) 2 mg capsule Take 2 mg by mouth as needed for diarrhea  Patient not taking: Reported on 8/27/2021    Historical Provider, MD   melatonin 3 mg Take 1 tablet (3 mg total) by mouth daily at bedtime as needed (insomnia)  Patient not taking: Reported on 3/30/2021 1/7/20   TAMMIE Sargent   methocarbamol (ROBAXIN) 500 mg tablet Take 1 tablet (500 mg total) by mouth every 6 (six) hours as needed for muscle spasms  Patient not taking: Reported on 3/30/2021 12/23/18   Leigha Carrero MD   Potassium 99 MG TABS Take 99 mg by mouth  Patient not taking: Reported on 8/27/2021    Historical Provider, MD prasugrel (EFFIENT) tablet Take 10 mg by mouth  Patient not taking: Reported on 2021    Historical Provider, MD   predniSONE 20 mg tablet Take 1 tablet (20 mg total) by mouth daily  Patient not taking: Reported on 3/30/2021 1/8/20   Silvia Solis PA-C   sodium chloride 0 9 % nebulizer solution Take 3 mL by nebulization 3 (three) times a day 18   Luh Lyon MD   traZODone (DESYREL) 50 mg tablet Take 1 tablet (50 mg total) by mouth daily at bedtime as needed for sleep  Patient not taking: Reported on 3/30/2021 1/7/20   TAMMIE Duran     I have reviewed home medications per review of chart and PDMP  Allergies: Allergies   Allergen Reactions    Sulfa Antibiotics Anaphylaxis    Niacin     Statins Other (See Comments)     cramps       Social History:  Marital Status:      Patient Pre-hospital Living Situation: Home    Patient Pre-hospital Diet Restrictions: n/a  Substance Use History:   Social History     Substance and Sexual Activity   Alcohol Use Never     Social History     Tobacco Use   Smoking Status Former Smoker    Packs/day: 0 25    Years: 0 00    Pack years: 0 00    Types: Cigarettes    Quit date: 2020    Years since quittin 9   Smokeless Tobacco Never Used     Social History     Substance and Sexual Activity   Drug Use No       Family History:  Family History   Problem Relation Age of Onset    Cancer Father        Physical Exam:     Vitals:   Blood Pressure: 156/70 (21)  Pulse: 90 (21)  Temperature: 98 2 °F (36 8 °C) (21)  Temp Source: Oral (21)  Respirations: 18 (21)  Weight - Scale: 46 9 kg (103 lb 6 3 oz) (21)  SpO2: 98 % (21)    Physical Exam  Vitals and nursing note reviewed  Constitutional:       General: She is not in acute distress  Appearance: She is not diaphoretic  Comments: Thin, cachectic   HENT:      Head: Normocephalic     Cardiovascular:      Rate and Rhythm: Normal rate and regular rhythm  Pulses: Normal pulses  Heart sounds: Normal heart sounds  Pulmonary:      Effort: Pulmonary effort is normal  No respiratory distress  Breath sounds: Normal breath sounds  No stridor  No wheezing or rales  Abdominal:      General: Abdomen is flat  Bowel sounds are normal  There is no distension  Palpations: Abdomen is soft  Tenderness: There is no abdominal tenderness  There is no guarding  Musculoskeletal:         General: No tenderness  Right lower leg: No edema  Left lower leg: No edema  Skin:     General: Skin is warm and dry  Neurological:      General: No focal deficit present  Mental Status: She is alert and oriented to person, place, and time  Psychiatric:         Mood and Affect: Mood normal          Behavior: Behavior normal          Thought Content: Thought content normal          Judgment: Judgment normal          Additional Data:     Lab Results:  Results from last 7 days   Lab Units 08/26/21  2213   WBC Thousand/uL 26 20*   HEMOGLOBIN g/dL 8 2*   HEMATOCRIT % 28 4*   PLATELETS Thousands/uL 188   BANDS PCT % 2   LYMPHO PCT % 67*   MONO PCT % 6   EOS PCT % 1     Results from last 7 days   Lab Units 08/26/21  2213   SODIUM mmol/L 143   POTASSIUM mmol/L 3 7   CHLORIDE mmol/L 102   CO2 mmol/L 38*   BUN mg/dL 15   CREATININE mg/dL 0 70   ANION GAP mmol/L 3*   CALCIUM mg/dL 8 7   ALBUMIN g/dL 2 6*   TOTAL BILIRUBIN mg/dL 0 32   ALK PHOS U/L 130*   ALT U/L 10*   AST U/L 13   GLUCOSE RANDOM mg/dL 114     Results from last 7 days   Lab Units 08/26/21  2213   INR  1 09                   Imaging: Reviewed radiology reports from this admission including: CT a chest abdomen pelvis  CTA dissection protocol chest abdomen pelvis w wo contrast   Final Result by Arpan Noonan MD (08/27 0055)   Lack of mesenteric fat and oral contrast limits evaluation of the abdomen and pelvis        Findings in the lungs, detailed further above, suggest an infectious or inflammatory process possibly with superimposed pulmonary edema  Bilateral hilar adenopathy is noted    Conglomerate mediastinal adenopathy is noted  Recommend pulmonary consultation and follow-up to resolution  Persistent hepatosplenomegaly  Focal focal hyperenhancing lesions in the liver are noted  Recommend MRI of the abdomen without and with contrast, hepatic protocol, for further evaluation, electively  No acute aortic injury or dissection  Cachexia  Workstation performed: [id number]         XR chest 2 views   ED Interpretation by Paulino Montanez DO (08/26 2203)   Interstitial edema          EKG and Other Studies Reviewed on Admission:   · EKG: Normal sinus rhythm  ** Please Note: This note has been constructed using a voice recognition system   **

## 2021-08-27 NOTE — ASSESSMENT & PLAN NOTE
· Meeting criteria due to tachycardia, tachypnea most likely in setting of pulmonary etiology  · Will start IV antibiotic  · Due to pulmonary edema contraindicated to start IV fluids, BP stable  · Check lactic acid, blood culture x2 pending

## 2021-08-27 NOTE — ASSESSMENT & PLAN NOTE
· CT a revealing clustered pulmonary nodular changes rule out infection versus inflammatory process  · Patient currently saturating 100% on baseline 5 L nasal cannula, will continue along with incentive spirometry  · Appreciate pulmonology consult  · Will add IV ceftriaxone 1 g q d   To rule out pneumonia  · Check procalcitonin

## 2021-08-27 NOTE — CONSULTS
Pulmonary Consultation   Jc Street 77 y o  female MRN: [id number]  Unit/Bed#: -01 Encounter: [id number]      Reason for consultation:  COPD exacerbation    Requesting physician:  Arturo Mendez    Impressions/Plans:    1  Acute pulmonary insufficiency with chronic hypoxic respiratory failure-multifactorial  · Patient states she has uses 5 L oxygen at home, here she is on 3 L and saturating well  · Will do reassessment prior to discharge home to accurately document needs    2  COPD/bronchiectasis with acute exacerbation  · Solu-Medrol 40 mg IV q 12 hours  · Atrovent/Xopenex t i d   · Home regimen Incruse daily with albuterol p r n  · Would benefit from outpatient pulmonary rehab  · I encouraged the patient to continue follow-up in our outpatient pulmonary office after discharge as I believe she would benefit from ongoing pulmonary evaluation/treatments    3  Abnormal CT chest concerning for pulmonary edema with possible superimposed infectious/inflammatory infiltrates  Negative procalcitonin on admission  · Recheck procalcitonin in a m  · Continue empiric Zithromax and ceftriaxone for now  · Repeat CT chest in 4 weeks to document resolution    4  Nicotine dependence with ongoing cigarette use  · Strongly encourage smoking cessation  · Nicotine patch if needed    History of Present Illness   HPI:  Jc Street is a 77 y o  female who has a history of COPD and bronchiectasis who started with shortness of breath and heart palpitations 3-4 days ago  She thought that the symptoms were related to the heat and humidity  Unfortunately her shortness of breath worsened at which time she also developed some chest tightness, and minimal cough  She states that she may have had 1 mild fever  She denies any sick contacts  She states overall she did not feel well   In addition to above symptoms she had had some increased fatigue and also was starting to feel nervous/anxious about her breathing      She has a history of COPD for which she uses Incruse once daily and Bentyl in p r n  Korin Gibbons She used her Bentyl in over the past couple of days but felt that it did not help  She previously had a lung doctor in [place] but states that she has not followed with anyone since she has moved here  She quit smoking 4 years ago but then unfortunately approximately 7 months ago started smoking again  She is currently smoking 3-4 cigarettes a day  In addition to above she has chronic diarrhea related to irritable bowel syndrome  She has had significant weight loss of approximately 60 lb of unknown reason over the past year  Review of systems:  12 point review of systems was completed and was otherwise negative except as listed in HPI        Historical Information   Past Medical History:   Diagnosis Date    Anxiety     Cardiac disease     Chronic pain disorder     [facility] disease) ([facility] [address] )     Depression     Heart disease     Hyperlipidemia     Hypertension     [facility]) ([facility] )     MRSA (methicillin resistant Staphylococcus aureus)     Renal disorder     benign kidney tumor     Past Surgical History:   Procedure Laterality Date    APPENDECTOMY      ELBOW BURSA SURGERY Left 02/2018    D/T MRSA INFECTION    SPINAL FUSION      L7 L9     Family History   Problem Relation Age of Onset    Heart disease Mother     Cancer Father        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    Social History:  Social History     Tobacco Use   Smoking Status Current Every Day Smoker    Packs/day: 1 00    Years: 60 00    Pack years: 60 00    Types: Cigarettes   Smokeless Tobacco Never Used   Tobacco Comment    She has close to a 60 pack-year smoking history, most recently has cut down to 4-5 cigarettes daily     Social History     Substance and Sexual Activity   Alcohol Use Never     Social History     Substance and Sexual Activity   Drug Use No     Marital Status:       Meds/Allergies   Current Facility-Administered Medications   Medication Dose Route Frequency    acetaminophen (TYLENOL) tablet 650 mg  650 mg Oral Q6H PRN    albuterol (PROVENTIL HFA,VENTOLIN HFA) inhaler 2 puff  2 puff Inhalation Q4H PRN    aspirin (ECOTRIN LOW STRENGTH) EC tablet 81 mg  81 mg Oral Daily    [START ON 8/28/2021] azithromycin (ZITHROMAX) tablet 250 mg  250 mg Oral Q24H    calcium carbonate-vitamin D (OSCAL-D) 500 mg-200 units per tablet 1 tablet  1 tablet Oral Daily With Breakfast    carvedilol (COREG) tablet 6 25 mg  6 25 mg Oral BID With Meals    ceftriaxone (ROCEPHIN) 1 g/50 mL in dextrose IVPB  1,000 mg Intravenous Q24H    [START ON 8/28/2021] furosemide (LASIX) tablet 40 mg  40 mg Oral Daily    HYDROcodone-acetaminophen (NORCO) 5-325 mg per tablet 1 tablet  1 tablet Oral Q8H PRN    ipratropium (ATROVENT) 0 02 % inhalation solution 0 5 mg  0 5 mg Nebulization TID    levalbuterol (XOPENEX) inhalation solution 1 25 mg  1 25 mg Nebulization TID    methylPREDNISolone sodium succinate (Solu-MEDROL) injection 40 mg  40 mg Intravenous Q12H ROSELINE    pravastatin (PRAVACHOL) tablet 20 mg  20 mg Oral Daily With Dinner    vilazodone (VIIBRYD) tablet 40 mg  40 mg Oral Daily With Breakfast     Medications Prior to Admission   Medication    albuterol (VENTOLIN HFA) 90 mcg/act inhaler    carvedilol (COREG) 25 mg tablet    enalapril (VASOTEC) 5 mg tablet    nitroglycerin (NITROSTAT) 0 4 mg SL tablet    vilazodone (Viibryd) 40 mg tablet    alendronate (FOSAMAX) 35 mg tablet    amLODIPine (NORVASC) 5 mg tablet    ascorbic acid (VITAMIN C) 500 mg tablet    budesonide-formoterol (SYMBICORT) 160-4 5 mcg/act inhaler    Calcium-Magnesium-Vitamin D (CALCIUM 500 PO)    cholecalciferol (VITAMIN D3) 1,000 units tablet    escitalopram (LEXAPRO) 10 mg tablet    ferrous sulfate 325 (65 Fe) mg tablet    guaiFENesin (MUCINEX) 600 mg 12 hr tablet    hydrochlorothiazide (HYDRODIURIL) 25 mg tablet    HYDROcodone-acetaminophen (NORCO) 5-325 mg per tablet    levalbuterol (XOPENEX) 1 25 mg/0 5 mL nebulizer solution    lidocaine (LIDODERM) 5 %    loperamide (IMODIUM) 2 mg capsule    melatonin 3 mg    methocarbamol (ROBAXIN) 500 mg tablet    Potassium 99 MG TABS    prasugrel (EFFIENT) tablet    predniSONE 20 mg tablet    sodium chloride 0 9 % nebulizer solution    traZODone (DESYREL) 50 mg tablet     Allergies   Allergen Reactions    Sulfa Antibiotics Anaphylaxis    Niacin     Statins Other (See Comments)     cramps       Vitals: Blood pressure 140/73, pulse 93, temperature 98 6 °F (37 °C), resp  rate 17, height 5' 8" (1 727 m), weight 46 9 kg (103 lb 6 3 oz), SpO2 97 %, not currently breastfeeding  , 3 L nasal cannula, Body mass index is 15 72 kg/m²        Intake/Output Summary (Last 24 hours) at 8/27/2021 1622  Last data filed at 8/27/2021 0450  Gross per 24 hour   Intake 550 ml   Output --   Net 550 ml       Physical exam:    General Appearance:    Alert, cooperative, no conversational dyspnea or accessory     muscle use       Head/eyes:    Normocephalic, without obvious abnormality, atraumatic,         PERRL, sclera non-injected, nonicteric    Nose:   Nares normal, mucosa normal, no drainage or sinus tenderness   Mouth:   Moist mucous membranes, no thrush, normal dentition   Neck:   Supple, trachea midline, no adenopathy; JVD not elevated   Lungs:     Diminished breath sounds in all lung fields with some rhonchi at left lung base, mild expiratory wheeze   Chest Wall:    No tenderness or deformity    Heart:    Regular rate and rhythm, S1 and S2 normal, no murmur, rub   or gallop   Abdomen:     Soft, non-tender, bowel sounds active all four quadrants,     no masses, no organomegaly   Extremities:   Extremities normal, atraumatic, no cyanosis or edema   Skin:   Warm, dry, turgor normal, no rashes or lesions   Lymph nodes:   No Cervical or supraclavicular lymphadenopathy   Neurologic:   CNII-XII grossly intact, normal strength, non-focal         Labs:   CBC:   Lab Results   Component Value Date    WBC 20 45 (H) 08/27/2021    HGB 8 1 (L) 08/27/2021    HCT 27 9 (L) 08/27/2021     (H) 08/27/2021     08/27/2021    MCH 30 1 08/27/2021    MCHC 29 0 (L) 08/27/2021    RDW 13 9 08/27/2021    MPV 8 7 (L) 08/27/2021   , CMP:   Lab Results   Component Value Date    SODIUM 143 08/26/2021    K 3 7 08/26/2021     08/26/2021    CO2 38 (H) 08/26/2021    BUN 15 08/26/2021    CREATININE 0 70 08/26/2021    CALCIUM 8 7 08/26/2021    AST 13 08/26/2021    ALT 10 (L) 08/26/2021    ALKPHOS 130 (H) 08/26/2021    EGFR 86 08/26/2021       Imaging and other studies: I have personally reviewed pertinent films in PACS  CT chest 8/27-diffuse emphysema with bilateral posterior consolidations/in inflammatory changes  Mild pulmonary edema  Hilar lymphadenopathy  Pulmonary function testing:  Not available        Nika Walsh DO      Portions of the record may have been created with voice recognition software  Occasional wrong word or "sound a like" substitutions may have occurred due to the inherent limitations of voice recognition software  Read the chart carefully and recognize, using context, where substitutions have occurred

## 2021-08-27 NOTE — ED PROVIDER NOTES
History  Chief Complaint   Patient presents with    Shortness of Breath     Pt reports shortness of breath x 3 days and chest tightness  Pt reports left sided back pain yesterday, pt also has unplanned weight loss of greater than 60 pounds in the past year  Patient is a 28-year-old female past medical history of hypertension, hyperlipidemia, CAD, COPD on 5 L nasal cannula with no prior intubations, anxiety depression presenting with chest pain shortness of breath  Patient states for the last 3 days she has had central chest tightness/heaviness which is nonradiating and constant, gradually worsening over the past 3 days associated with exertional shortness of breath, dizziness and palpitations  She also notes left-sided low back pain which radiated up her back yesterday but has since resolved  Notes unexplained 60 lb weight loss in the last year  She notes a cough which is usually productive of sputum but has been dry for the last 2-3 days  Denies any prior blood clots, fevers, upper respiratory symptoms, nausea/vomiting/diarrhea/constipation, leg pain or swelling, rashes, vision changes, dysuria  Notes compliance with her medications  Prior to Admission Medications   Prescriptions Last Dose Informant Patient Reported? Taking? Calcium-Magnesium-Vitamin D (CALCIUM 500 PO)   Yes No   Sig: Take 1,000 mg by mouth daily   HYDROcodone-acetaminophen (NORCO) 5-325 mg per tablet   Yes No   Sig: Take 1 tablet by mouth every 6 (six) hours as needed for pain   Potassium 99 MG TABS Not Taking at Unknown time  Yes No   Sig: Take 99 mg by mouth   Patient not taking: Reported on 8/27/2021   albuterol (VENTOLIN HFA) 90 mcg/act inhaler   No Yes   Sig: Inhale 2 puffs every 6 (six) hours as needed for wheezing   alendronate (FOSAMAX) 35 mg tablet Not Taking at Unknown time  Yes No   Sig: Take 35 mg by mouth every 7 days Patient takes this med every Sunday     Patient not taking: Reported on 8/27/2021   amLODIPine (NORVASC) 5 mg tablet Not Taking at Unknown time  No No   Sig: Take 1 tablet (5 mg total) by mouth daily   Patient not taking: Reported on 3/30/2021   ascorbic acid (VITAMIN C) 500 mg tablet Not Taking at Unknown time  Yes No   Sig: Take 500 mg by mouth daily   Patient not taking: Reported on 8/27/2021   budesonide-formoterol (SYMBICORT) 160-4 5 mcg/act inhaler   No No   Sig: Inhale 2 puffs 2 (two) times a day Rinse mouth after use     Patient not taking: Reported on 3/30/2021   carvedilol (COREG) 25 mg tablet   No Yes   Sig: Take 1 tablet (25 mg total) by mouth 2 (two) times a day with meals   cholecalciferol (VITAMIN D3) 1,000 units tablet   No No   Sig: Take 1 tablet (1,000 Units total) by mouth daily   Patient not taking: Reported on 3/30/2021   enalapril (VASOTEC) 5 mg tablet   No Yes   Sig: Take 1 tablet (5 mg total) by mouth 2 (two) times a day   escitalopram (LEXAPRO) 10 mg tablet   No No   Sig: Take 1 tablet (10 mg total) by mouth daily   Patient taking differently: Take 20 mg by mouth daily    ferrous sulfate 325 (65 Fe) mg tablet Not Taking at Unknown time  Yes No   Sig: Take 325 mg by mouth daily with breakfast   Patient not taking: Reported on 8/27/2021   guaiFENesin (MUCINEX) 600 mg 12 hr tablet Not Taking at Unknown time  No No   Sig: Take 1 tablet (600 mg total) by mouth 2 (two) times a day   Patient not taking: Reported on 3/30/2021   hydrochlorothiazide (HYDRODIURIL) 25 mg tablet Not Taking at Unknown time  No No   Sig: Take 1 tablet (25 mg total) by mouth daily   Patient not taking: Reported on 3/30/2021   levalbuterol (XOPENEX) 1 25 mg/0 5 mL nebulizer solution Not Taking at Unknown time  No No   Sig: Take 0 5 mL (1 25 mg total) by nebulization 3 (three) times a day   Patient not taking: Reported on 8/27/2021   lidocaine (LIDODERM) 5 % Not Taking at Unknown time  No No   Sig: Apply 1 patch topically daily Remove & Discard patch within 12 hours or as directed by MD   Patient not taking: Reported on 3/30/2021   loperamide (IMODIUM) 2 mg capsule Not Taking at Unknown time  Yes No   Sig: Take 2 mg by mouth as needed for diarrhea   Patient not taking: Reported on 8/27/2021   melatonin 3 mg Not Taking at Unknown time  No No   Sig: Take 1 tablet (3 mg total) by mouth daily at bedtime as needed (insomnia)   Patient not taking: Reported on 3/30/2021   methocarbamol (ROBAXIN) 500 mg tablet Not Taking at Unknown time  No No   Sig: Take 1 tablet (500 mg total) by mouth every 6 (six) hours as needed for muscle spasms   Patient not taking: Reported on 3/30/2021   nitroglycerin (NITROSTAT) 0 4 mg SL tablet   Yes Yes   Sig: Place 0 4 mg under the tongue every 5 (five) minutes as needed for chest pain   prasugrel (EFFIENT) tablet Not Taking at Unknown time  Yes No   Sig: Take 10 mg by mouth   Patient not taking: Reported on 8/27/2021   predniSONE 20 mg tablet Not Taking at Unknown time  No No   Sig: Take 1 tablet (20 mg total) by mouth daily   Patient not taking: Reported on 3/30/2021   sodium chloride 0 9 % nebulizer solution   No No   Sig: Take 3 mL by nebulization 3 (three) times a day   traZODone (DESYREL) 50 mg tablet Not Taking at Unknown time  No No   Sig: Take 1 tablet (50 mg total) by mouth daily at bedtime as needed for sleep   Patient not taking: Reported on 3/30/2021   vilazodone (Viibryd) 40 mg tablet   Yes Yes   Sig: Take 40 mg by mouth daily with breakfast      Facility-Administered Medications: None       Past Medical History:   Diagnosis Date    Anxiety     Cardiac disease     Chronic pain disorder     COPD [facility]ch[facility] obstructive pulmonary disease) ([facility] )     Depression     Heart disease     Hyperlipidemia     Hypertension     MI (myocardial infarction) ([facility] )     MRSA (methicillin resistant Staphylococcus aureus)     Renal disorder     benign kidney tumor       Past Surgical History:   Procedure Laterality Date    APPENDECTOMY      ELBOW BURSA SURGERY Left 02/2018    D/T MRSA INFECTION    SPINAL FUSION      L7 L9       Family History   Problem Relation Age of Onset    Cancer Father      I have reviewed and agree with the history as documented  E-Cigarette/Vaping     E-Cigarette/Vaping Substances     Social History     Tobacco Use    Smoking status: Former Smoker     Packs/day: 0 25     Years: 0 00     Pack years: 0 00     Types: Cigarettes     Quit date: 2020     Years since quittin 9    Smokeless tobacco: Never Used   Substance Use Topics    Alcohol use: Never    Drug use: No       Review of Systems   All other systems reviewed and are negative  Physical Exam  Physical Exam  Vitals reviewed  Constitutional:       General: She is not in acute distress  Appearance: Normal appearance  She is not ill-appearing  HENT:      Mouth/Throat:      Mouth: Mucous membranes are moist    Eyes:      Conjunctiva/sclera: Conjunctivae normal       Comments: Normal conjunctiva   Cardiovascular:      Rate and Rhythm: Normal rate and regular rhythm  Pulses: Normal pulses  Heart sounds: Normal heart sounds  Pulmonary:      Effort: Pulmonary effort is normal       Breath sounds: Examination of the right-upper field reveals decreased breath sounds and rales  Examination of the left-upper field reveals decreased breath sounds and rales  Examination of the right-middle field reveals decreased breath sounds and rales  Examination of the left-middle field reveals decreased breath sounds and rales  Examination of the right-lower field reveals decreased breath sounds and rales  Examination of the left-lower field reveals decreased breath sounds and rales  Decreased breath sounds and rales present  Abdominal:      General: Abdomen is flat  Palpations: Abdomen is soft  Tenderness: There is no abdominal tenderness  Musculoskeletal:         General: No swelling  Normal range of motion  Cervical back: Neck supple  Right lower leg: No tenderness  No edema        Left lower leg: No tenderness  No edema  Skin:     General: Skin is warm and dry  Neurological:      General: No focal deficit present  Mental Status: She is alert     Psychiatric:         Mood and Affect: Mood normal          Vital Signs  ED Triage Vitals   Temperature Pulse Respirations Blood Pressure SpO2   08/26/21 2210 08/26/21 2127 08/26/21 2127 08/26/21 2127 08/26/21 2127   98 2 °F (36 8 °C) 95 (!) 24 158/68 100 %      Temp Source Heart Rate Source Patient Position - Orthostatic VS BP Location FiO2 (%)   08/26/21 2210 08/26/21 2127 08/27/21 0000 08/27/21 0000 --   Oral Monitor Lying Right arm       Pain Score       08/27/21 0418       6           Vitals:    08/27/21 0300 08/27/21 0326 08/27/21 0400 08/27/21 0450   BP: 155/70 156/70 108/62 108/64   Pulse: 94 90 92 93   Patient Position - Orthostatic VS: Lying Lying Lying Lying         Visual Acuity  Visual Acuity      Most Recent Value   L Pupil Size (mm)  3   R Pupil Size (mm)  3   L Pupil Shape  Round   R Pupil Shape  Round          ED Medications  Medications   albuterol (PROVENTIL HFA,VENTOLIN HFA) inhaler 2 puff (has no administration in time range)   calcium carbonate-vitamin D (OSCAL-D) 500 mg-200 units per tablet 1 tablet (has no administration in time range)   carvedilol (COREG) tablet 25 mg (has no administration in time range)   HYDROcodone-acetaminophen (NORCO) 5-325 mg per tablet 1 tablet (1 tablet Oral Given 8/27/21 0418)   vilazodone (VIIBRYD) tablet 40 mg (has no administration in time range)   acetaminophen (TYLENOL) tablet 650 mg (has no administration in time range)   furosemide (LASIX) injection 20 mg (has no administration in time range)   ceftriaxone (ROCEPHIN) 1 g/50 mL in dextrose IVPB (0 mg Intravenous Stopped 8/27/21 0450)   albuterol inhalation solution 2 5 mg (has no administration in time range)   sodium chloride 0 9 % bolus 500 mL (0 mL Intravenous Stopped 8/27/21 0053)   iohexol (OMNIPAQUE) 350 MG/ML injection (SINGLE-DOSE) 100 mL (100 mL Intravenous Given 8/26/21 2300)   furosemide (LASIX) injection 40 mg (40 mg Intravenous Given 8/27/21 0144)       Diagnostic Studies  Results Reviewed     Procedure Component Value Units Date/Time    Troponin I [[id number]]  (Normal) Collected: 08/27/21 0414    Lab Status: Final result Specimen: Blood from Arm, Right Updated: 08/27/21 0441     Troponin I <0 02 ng/mL     CBC and Platelet [[id number]]  (Abnormal) Collected: 08/27/21 0414    Lab Status: Final result Specimen: Blood from Arm, Right Updated: 08/27/21 0421     WBC 20 45 Thousand/uL      RBC 2 69 Million/uL      Hemoglobin 8 1 g/dL      Hematocrit 27 9 %       fL      MCH 30 1 pg      MCHC 29 0 g/dL      RDW 13 9 %      Platelets 325 Thousands/uL      MPV 8 7 fL     Lactic acid, plasma [[id number]]  (Normal) Collected: 08/27/21 0324    Lab Status: Final result Specimen: Blood from Arm, Right Updated: 08/27/21 0356     LACTIC ACID 0 6 mmol/L     Narrative:      Result may be elevated if tourniquet was used during collection  Troponin I [[id number]]  (Normal) Collected: 08/27/21 0324    Lab Status: Final result Specimen: Blood from Arm, Right Updated: 08/27/21 0355     Troponin I <0 02 ng/mL     Procalcitonin with AM Reflex [[id number]] Collected: 08/27/21 0324    Lab Status: In process Specimen: Blood from Arm, Right Updated: 08/27/21 0333    Blood culture [[id number]] Collected: 08/27/21 0324    Lab Status: In process Specimen: Blood from Arm, Left Updated: 08/27/21 0333    Blood culture [[id number]] Collected: 08/27/21 0324    Lab Status: In process Specimen: Blood from Arm, Right Updated: 08/27/21 0333    Novel Coronavirus (Covid-19),PCR [facility] - 2 Hour Stat [[id number]]  (Normal) Collected: 08/27/21 0052    Lab Status: Final result Specimen: Nares from Nose Updated: 08/27/21 0148     SARS-CoV-2 Negative    Narrative:       The specimen collection materials, transport medium, and/or testing methodology utilized in the production of these test results have been proven to be reliable in a limited validation with an abbreviated program under the Emergency Utilization Authorization provided by the FDA  Testing reported as "Presumptive positive" will be confirmed with secondary testing to ensure result accuracy  Clinical caution and judgement should be used with the interpretation of these results with consideration of the clinical impression and other laboratory testing  Testing reported as "Positive" or "Negative" has been proven to be accurate according to standard laboratory validation requirements  All testing is performed with control materials showing appropriate reactivity at standard intervals        CBC and differential [[id number]]  (Abnormal) Collected: 08/26/21 2213    Lab Status: Final result Specimen: Blood from Arm, Left Updated: 08/26/21 2307     WBC 26 20 Thousand/uL      RBC 2 82 Million/uL      Hemoglobin 8 2 g/dL      Hematocrit 28 4 %       fL      MCH 29 1 pg      MCHC 28 9 g/dL      RDW 13 7 %      MPV 9 1 fL      Platelets 841 Thousands/uL     Manual Differential(PHLEBS Do Not Order) [[id number]]  (Abnormal) Collected: 08/26/21 2213    Lab Status: Final result Specimen: Blood from Arm, Left Updated: 08/26/21 2307     Segmented % 24 %      Bands % 2 %      Lymphocytes % 67 %      Monocytes % 6 %      Eosinophils, % 1 %      Basophils % 0 %      Absolute Neutrophils 6 81 Thousand/uL      Lymphocytes Absolute 17 55 Thousand/uL      Monocytes Absolute 1 57 Thousand/uL      Eosinophils Absolute 0 26 Thousand/uL      Basophils Absolute 0 00 Thousand/uL      Total Counted --     RBC Morphology Normal     Platelet Estimate Adequate    NT-BNP PRO [[id number]]  (Abnormal) Collected: 08/26/21 2213    Lab Status: Final result Specimen: Blood from Arm, Left Updated: 08/26/21 2246     NT-proBNP 1,728 pg/mL     Troponin I [[id number]]  (Normal) Collected: 08/26/21 2213    Lab Status: Final result Specimen: Blood from Arm, Left Updated: 08/26/21 2244 Troponin I <0 02 ng/mL     Comprehensive metabolic panel [[id number]]  (Abnormal) Collected: 08/26/21 2213    Lab Status: Final result Specimen: Blood from Arm, Left Updated: 08/26/21 2240     Sodium 143 mmol/L      Potassium 3 7 mmol/L      Chloride 102 mmol/L      CO2 38 mmol/L      ANION GAP 3 mmol/L      BUN 15 mg/dL      Creatinine 0 70 mg/dL      Glucose 114 mg/dL      Calcium 8 7 mg/dL      Corrected Calcium 9 8 mg/dL      AST 13 U/L      ALT 10 U/L      Alkaline Phosphatase 130 U/L      Total Protein 7 4 g/dL      Albumin 2 6 g/dL      Total Bilirubin 0 32 mg/dL      eGFR 86 ml/min/1 73sq m     Narrative:      [place] guidelines for Chronic Kidney Disease (CKD):     Stage 1 with normal or high GFR (GFR > 90 mL/min/1 73 square meters)    Stage 2 Mild CKD (GFR = 60-89 mL/min/1 73 square meters)    Stage 3A Moderate CKD (GFR = 45-59 mL/min/1 73 square meters)    Stage 3B Moderate CKD (GFR = 30-44 mL/min/1 73 square meters)    Stage 4 Severe CKD (GFR = 15-29 mL/min/1 73 square meters)    Stage 5 End Stage CKD (GFR <15 mL/min/1 73 square meters)  Note: GFR calculation is accurate only with a steady state creatinine    Magnesium [[id number]]  (Normal) Collected: 08/26/21 2213    Lab Status: Final result Specimen: Blood from Arm, Left Updated: 08/26/21 2240     Magnesium 1 7 mg/dL     Protime-INR [[id number]]  (Normal) Collected: 08/26/21 2213    Lab Status: Final result Specimen: Blood from Arm, Left Updated: 08/26/21 2237     Protime 14 3 seconds      INR 1 09    APTT [[id number]]  (Normal) Collected: 08/26/21 2213    Lab Status: Final result Specimen: Blood from Arm, Left Updated: 08/26/21 2237     PTT 34 seconds                  CTA dissection protocol chest abdomen pelvis w wo contrast   Final Result by Vani Boles MD (08/27 0055)   Lack of mesenteric fat and oral contrast limits evaluation of the abdomen and pelvis        Findings in the lungs, detailed further above, suggest an infectious or inflammatory process possibly with superimposed pulmonary edema  Bilateral hilar adenopathy is noted    Conglomerate mediastinal adenopathy is noted  Recommend pulmonary consultation and follow-up to resolution  Persistent hepatosplenomegaly  Focal focal hyperenhancing lesions in the liver are noted  Recommend MRI of the abdomen without and with contrast, hepatic protocol, for further evaluation, electively  No acute aortic injury or dissection  Cachexia  Workstation performed: [id number]         XR chest 2 views   ED Interpretation by Irene Hyde DO (08/26 2203)   Interstitial edema                 Procedures  ECG 12 Lead Documentation Only    Date/Time: 8/26/2021 9:40 PM  Performed by: Irene Hyde DO  Authorized by: Irene Hyde DO     ECG reviewed by me, the ED Provider: yes    Patient location:  ED  Previous ECG:     Previous ECG:  Compared to current    Similarity:  No change  Interpretation:     Interpretation: normal    Rate:     ECG rate assessment: normal    Rhythm:     Rhythm: sinus rhythm    Ectopy:     Ectopy: none    QRS:     QRS axis:  Normal    QRS intervals:  Normal  Conduction:     Conduction: normal    ST segments:     ST segments:  Normal  T waves:     T waves: normal               ED Course  ED Course as of Aug 27 0614   Fri Aug 27, 2021   0107 Patient with CT findings of pulmonary edema otherwise unremarkable, will give Lasix and admit                                    ADRIEN Risk Score      Most Recent Value   Age >= 72  1 Filed at: 08/27/2021 0327   Known CAD (stenosis >= 50%)  0 Filed at: 08/27/2021 0327   Recent (<=24 hrs) Service Angina  1 Filed at: 08/27/2021 0327   ST Deviation >= 0 5 mm  0 Filed at: 08/27/2021 0327   3+ CAD Risk Factors (FHx, HTN, HLP, DM, Smoker)  0 Filed at: 08/27/2021 0327   Aspirin Use Past 7 Days  0 Filed at: 08/27/2021 0327   Elevated Cardiac Markers  0 Filed at: 08/27/2021 0327   ADRIEN Risk Score (Calculated)  2 Filed at: 08/27/2021 0327                  MDM  Number of Diagnoses or Management Options  Diagnosis management comments: Patient is a 18-year-old female past medical history of hypertension, CAD, hyperlipidemia, COPD on 5 L nasal cannula presenting with chest pain, shortness of breath, palpitations  Patient is well-appearing at bedside with stable vitals and saturating appropriately on her home 5 L nasal cannula but with rales throughout bilateral lung fields and decreased lung sounds but with no retractions, speaking in full sentences with no conversational dyspnea  Patient has no lower extremity edema, equal radial pulses, no abdominal tenderness and no other significant physical exam findings  Will obtain cardiac workup, administer small fluid bolus due to lightheadedness and palpitations with heart rate 90s to 100s, and obtain CT dissection study due to complaint of chest pain, back pain, mild hypertension  Disposition  Final diagnoses:   CHF (congestive heart failure) ([facility] )     Time reflects when diagnosis was documented in both MDM as applicable and the Disposition within this note     Time User Action Codes Description Comment    8/27/2021  1:44 AM Александр Paulino Add [I50 9] CHF (congestive heart failure) ([facility] )     8/27/2021  3:17 AM Braulio Bell Add [R93 89] Abnormal computed tomography angiography (CTA)     8/27/2021  3:17 AM Braulio Bell Add [J44 9] COPD (chronic obstructive pulmonary disease) [facility])       ED Disposition     ED Disposition Condition Date/Time Comment    Admit Stable Fri Aug 27, 2021  1:44 AM Case was discussed with Birgit Damico and the patient's admission status was agreed to be Admission Status: inpatient status to the service of Dr Andrews Host   Follow-up Information    None         Patient's Medications   Discharge Prescriptions    No medications on file     No discharge procedures on file      PDMP Review       Value Time User    PDMP Reviewed  Yes 8/27/2021  3:13 AM Myrtle Kenyon PA-C          ED Provider  Electronically Signed by           Neida Gomes DO  08/27/21 9893

## 2021-08-27 NOTE — ASSESSMENT & PLAN NOTE
· CT a negative for aneurysm, dissection, but does reveal mediastinal and hilar adenopathy  · Appreciate pulmonology consult  · Also incidental finding of hepatic lesion and hepatomegaly  · Discussed finding with patient and discussed following up outpatient with PCP for an abdomen MRI

## 2021-08-27 NOTE — PLAN OF CARE
Problem: Potential for Falls  Goal: Patient will remain free of falls  Description: INTERVENTIONS:  - Educate patient/family on patient safety including physical limitations  - Instruct patient to call for assistance with activity   - Consult OT/PT to assist with strengthening/mobility   - Keep Call bell within reach  - Keep bed low and locked with side rails adjusted as appropriate  - Keep care items and personal belongings within reach  - Initiate and maintain comfort rounds  - Make Fall Risk Sign visible to staff  - Offer Toileting every 2 Hours, in advance of need  - Initiate/Maintain bed alarm  - Obtain necessary fall risk management equipment: nonskid slippers  - Apply yellow socks and bracelet for high fall risk patients  - Consider moving patient to room near nurses station  Outcome: Progressing     Problem: Nutrition/Hydration-ADULT  Goal: Nutrient/Hydration intake appropriate for improving, restoring or maintaining nutritional needs  Description: Monitor and assess patient's nutrition/hydration status for malnutrition  Collaborate with interdisciplinary team and initiate plan and interventions as ordered  Monitor patient's weight and dietary intake as ordered or per policy  Utilize nutrition screening tool and intervene as necessary  Determine patient's food preferences and provide high-protein, high-caloric foods as appropriate       INTERVENTIONS:  - Monitor oral intake, urinary output, labs, and treatment plans  - Assess nutrition and hydration status and recommend course of action  - Evaluate amount of meals eaten  - Assist patient with eating if necessary   - Allow adequate time for meals  - Recommend/ encourage appropriate diets, oral nutritional supplements, and vitamin/mineral supplements  - Order, calculate, and assess calorie counts as needed  - Recommend, monitor, and adjust tube feedings and TPN/PPN based on assessed needs  - Assess need for intravenous fluids  - Provide specific nutrition/hydration education as appropriate  - Include patient/family/caregiver in decisions related to nutrition  Outcome: Progressing     Problem: MOBILITY - ADULT  Goal: Maintain or return to baseline ADL function  Description: INTERVENTIONS:  -  Assess patient's ability to carry out ADLs; assess patient's baseline for ADL function and identify physical deficits which impact ability to perform ADLs (bathing, care of mouth/teeth, toileting, grooming, dressing, etc )  - Assess/evaluate cause of self-care deficits   - Assess range of motion  - Assess patient's mobility; develop plan if impaired  - Assess patient's need for assistive devices and provide as appropriate  - Encourage maximum independence but intervene and supervise when necessary  - Involve family in performance of ADLs  - Assess for home care needs following discharge   - Consider OT consult to assist with ADL evaluation and planning for discharge  - Provide patient education as appropriate  Outcome: Progressing  Goal: Maintains/Returns to pre admission functional level  Description: INTERVENTIONS:  - Perform BMAT or MOVE assessment daily    - Set and communicate daily mobility goal to care team and patient/family/caregiver  - Collaborate with rehabilitation services on mobility goals if consulted  - Perform Range of Motion 2 times a day  - Reposition patient every 2 hours    - Dangle patient 2 times a day  - Stand patient 2 times a day  - Ambulate patient 2 times a day  - Out of bed to chair 2 times a day   - Out of bed for meals 2 times a day  - Out of bed for toileting  - Record patient progress and toleration of activity level   Outcome: Progressing

## 2021-08-27 NOTE — CONSULTS
Consultation - Cardiology Team One  Jose J Ulloa 77 y o  female MRN: [id number]  Unit/Bed#: ED 23 Encounter: [id number]    Inpatient consult to Cardiology  Consult performed by: [address], CRNP  Consult ordered by: Dayday Chino PA-C          Physician Requesting Consult: Darrin Gan MD  Reason for Consult / Principal Problem: chest pain and shortness of breath    Assessment/Plan:    1  Chest pain, palpitations and shortness of breath  -troponin negativen x 3  -CT of the chest with pulmonary edema superimposed over inflammation   -TTE ordered and pending, further plan of care to be established once those results obtained   -continue carvedilol, but decrease to 6 25 mg   -start aspirin and pravastatin   -telemetry monitoring    2  CAD s/p DANIELLA x1 to mRCA  -continue carvedilol, pravastatin and aspirin  -see plan as above   -telemetry monitoring    3  Hypertension  -overall well controlled  -continue carvedilol, other anti-hypertensives on hold for soft blood pressures    -continue to monitor blood pressures     4  Hyperlipidemia  -check lipid panel  -start pravastatin     5  History of COPD with chronic respiratory failure  -possible pulmonary infection going on  -start azithromycin 500 mg today, then 250 mg for 4 days after    -continue with nebulizer and inhalers  -wears 5L O2 at baseline        HPI: Cardiologist Dr Monse Flores is a 77y o  year old female who has a history of CAD s/p DANIELLA x1 to mRCA, COPD, hypertension, hyperlipidemia who presented to the emergency room for shortness of breath, palpitations and chest pain  She states these symptoms have been going on for the last week and getting progressively worse  She also states she experienced an episode like this 2 months ago, but did not seek medical care  Unfortunately, she had caffeine this morning therefore stress testing cannot be performed  She is a past heavy tobacco user and currently smokes 1-2 cigarettes a day          Troponin <0 02 x3, CT of the chest with interlobar septal thickening with possibly superimposed pulmonary edema  NT-proBNP 1728    She has followed up with cardiology in the past and was lost to follow-up  She states she has been noncompliant with aspirin due to nose bleeds and has not taken statins due to muscle cramps          REVIEW OF SYSTEMS:  Constitutional:  Denies fever or chills   Eyes:  Denies change in visual acuity   HENT:  Denies nasal congestion or sore throat   Respiratory:  Denies cough, orthopnea, PND , + shortness of breath   Cardiovascular: + chest pain, denies palpitations or edema   GI:  Denies abdominal pain, nausea, vomiting, bloody stools or diarrhea   :  Denies dysuria, frequency, difficulty in micturition and nocturia  Musculoskeletal:  Denies back pain or joint pain   Neurologic:  Denies headache, focal weakness or sensory changes   Endocrine:  Denies polyuria or polydipsia   Lymphatic:  Denies swollen glands   Psychiatric:  Denies depression or anxiety     Historical Information   Past Medical History:   Diagnosis Date    Anxiety     Cardiac disease     Chronic pain disorder     [facility] disease[facility] ([facility]     Depression     Heart disease     Hyperlipidemia     Hypertension     MI (myocardial infarction) ([facility] )     MRSA (methicillin resistant Staphylococcus aureus)     Renal disorder     benign kidney tumor     Past Surgical History:   Procedure Laterality Date    APPENDECTOMY      ELBOW BURSA SURGERY Left 2018    D/T MRSA INFECTION    SPINAL FUSION      L7 L9     Social History     Substance and Sexual Activity   Alcohol Use Never     Social History     Substance and Sexual Activity   Drug Use No     Social History     Tobacco Use   Smoking Status Former Smoker    Packs/day: 0 25    Years: 0 00    Pack years: 0 00    Types: Cigarettes    Quit date: 2020    Years since quittin 9   Smokeless Tobacco Never Used       Family History:   Family History   Problem Relation Age of Onset    Cancer Father        MEDS & ALLERGIES:  all current active meds have been reviewed and current meds:   Current Facility-Administered Medications   Medication Dose Route Frequency    acetaminophen (TYLENOL) tablet 650 mg  650 mg Oral Q6H PRN    albuterol (PROVENTIL HFA,VENTOLIN HFA) inhaler 2 puff  2 puff Inhalation Q4H PRN    albuterol inhalation solution 2 5 mg  2 5 mg Nebulization TID    calcium carbonate-vitamin D (OSCAL-D) 500 mg-200 units per tablet 1 tablet  1 tablet Oral Daily With Breakfast    carvedilol (COREG) tablet 25 mg  25 mg Oral BID With Meals    ceftriaxone (ROCEPHIN) 1 g/50 mL in dextrose IVPB  1,000 mg Intravenous Q24H    furosemide (LASIX) injection 20 mg  20 mg Intravenous Q12H ROSELINE    HYDROcodone-acetaminophen (NORCO) 5-325 mg per tablet 1 tablet  1 tablet Oral Q8H PRN    vilazodone (VIIBRYD) tablet 40 mg  40 mg Oral Daily With Breakfast        Allergies   Allergen Reactions    Sulfa Antibiotics Anaphylaxis    Niacin     Statins Other (See Comments)     cramps       OBJECTIVE:  Vitals:   Vitals:    08/27/21 0450   BP: 108/64   Pulse: 93   Resp: 22   Temp:    SpO2: 100%     Body mass index is 16 19 kg/m²  Systolic (66SSR), AZR:131 , Min:108 , SVH:176     Diastolic (27HMG), IRK:84, Min:62, Max:72      Intake/Output Summary (Last 24 hours) at 8/27/2021 0914  Last data filed at 8/27/2021 0450  Gross per 24 hour   Intake 550 ml   Output --   Net 550 ml     Weight (last 2 days)     Date/Time   Weight    08/26/21 2126   46 9 (103 4)            Invasive Devices     Peripheral Intravenous Line            Peripheral IV 08/26/21 Distal;Left;Ventral (anterior) Forearm 1 day    Peripheral IV 08/26/21 Left Antecubital <1 day                PHYSICAL EXAMS:  General:  Patient is not in acute distress, laying in the bed comfortably, awake, alert   Head: Normocephalic, Atraumatic     HEENT: White sclera, pink conjunctiva  Neck:  Supple, no neck vein distention  Respiratory: clear to auscultation   Cardiovascular:  PMI normal, S1-S2 normal, no murmurs, thrills, gallops, rubs, regular rhythm  GI:  Abdomen soft, non-tender, non-distended  Extremities: No edema, normal pulses  Integument:  No skin rashes or ulceration  Lymphatic:  No cervical or inguinal lymphadenopathy  Neurologic:  Patient is awake alert, responding to command, oriented to person, place and time     LABORATORY RESULTS:  Results from last 7 days   Lab Units 08/27/21  0414 08/27/21  0324 08/26/21 2213   TROPONIN I ng/mL <0 02 <0 02 <0 02     CBC with diff:   Results from last 7 days   Lab Units 08/27/21  0414 08/26/21 2213   WBC Thousand/uL 20 45* 26 20*   HEMOGLOBIN g/dL 8 1* 8 2*   HEMATOCRIT % 27 9* 28 4*   MCV fL 104* 101*   PLATELETS Thousands/uL 183 188   MCH pg 30 1 29 1   MCHC g/dL 29 0* 28 9*   RDW % 13 9 13 7   MPV fL 8 7* 9 1       CMP:  Results from last 7 days   Lab Units 08/26/21  2213   POTASSIUM mmol/L 3 7   CHLORIDE mmol/L 102   CO2 mmol/L 38*   BUN mg/dL 15   CREATININE mg/dL 0 70   CALCIUM mg/dL 8 7   AST U/L 13   ALT U/L 10*   ALK PHOS U/L 130*   EGFR ml/min/1 73sq m 86       BMP:  Results from last 7 days   Lab Units 08/26/21  2213   POTASSIUM mmol/L 3 7   CHLORIDE mmol/L 102   CO2 mmol/L 38*   BUN mg/dL 15   CREATININE mg/dL 0 70   CALCIUM mg/dL 8 7       Results from last 7 days   Lab Units 08/26/21  2213   NT-PRO BNP pg/mL 1,728*      Results from last 7 days   Lab Units 08/26/21  2213   MAGNESIUM mg/dL 1 7             Results from last 7 days   Lab Units 08/26/21  2213   INR  1 09       Lipid Profile:   No results found for: CHOL  No results found for: HDL  No results found for: LDLCALC  No results found for: TRIG    Cardiac testing:   No results found for this or any previous visit  No results found for this or any previous visit  No valid procedures specified  No results found for this or any previous visit      Imaging:   I have personally reviewed pertinent reports          EKG reviewed personally:  None available for my review     Telemetry reviewed personally:   Sinus rhythm in the 80's       Code Status: Level 1 - Full Code      TAMMIE Bennett  8/27/2021,9:14 AM

## 2021-08-27 NOTE — ASSESSMENT & PLAN NOTE
Wt Readings from Last 3 Encounters:   08/26/21 46 9 kg (103 lb 6 3 oz)   01/04/20 53 2 kg (117 lb 4 6 oz)   12/27/19 56 7 kg (125 lb)   · BNP elevated at 1728  · CT a revealing pulmonary edema, no clinical signs of volume overload though  · Echocardiogram ordered  · Appreciate cardiology consult  · Intake and output monitoring, daily weights, low-sodium diet with 1500 mL fluid restriction  · Initiated on IV Lasix in ED, will continue IV Lasix 20 mg q 12 hours

## 2021-08-27 NOTE — ASSESSMENT & PLAN NOTE
· ADRIEN score 2  · EKG revealing normal sinus rhythm  · Initial troponin negative, will trend 2 more times q 3 hours with EKG  · Telemetry

## 2021-08-27 NOTE — INCIDENTAL FINDINGS
The following findings require follow up:  Radiographic finding   Finding: "Persistent hepatosplenomegaly  Focal focal hyperenhancing lesions in the liver are noted   Recommend MRI of the abdomen without and with contrast, hepatic protocol, for further evaluation, electively  "     Follow up required:  With primary care physician   Follow up should be done within 1 month(s)    Please notify the following clinician to assist with the follow up:   Primary care physician

## 2021-08-28 PROBLEM — J44.1 COPD WITH ACUTE EXACERBATION (HCC): Status: ACTIVE | Noted: 2021-08-27

## 2021-08-28 PROBLEM — R07.9 CHEST PAIN: Status: RESOLVED | Noted: 2021-08-27 | Resolved: 2021-08-28

## 2021-08-28 PROBLEM — E43 SEVERE PROTEIN-CALORIE MALNUTRITION (HCC): Status: ACTIVE | Noted: 2021-08-28

## 2021-08-28 PROBLEM — R78.81 POSITIVE BLOOD CULTURE: Status: ACTIVE | Noted: 2021-08-28

## 2021-08-28 LAB
ANION GAP SERPL CALCULATED.3IONS-SCNC: 7 MMOL/L (ref 4–13)
BUN SERPL-MCNC: 25 MG/DL (ref 5–25)
CALCIUM SERPL-MCNC: 9.7 MG/DL (ref 8.3–10.1)
CHLORIDE SERPL-SCNC: 99 MMOL/L (ref 100–108)
CHOLEST SERPL-MCNC: 141 MG/DL (ref 50–200)
CO2 SERPL-SCNC: 36 MMOL/L (ref 21–32)
CREAT SERPL-MCNC: 0.87 MG/DL (ref 0.6–1.3)
GFR SERPL CREATININE-BSD FRML MDRD: 66 ML/MIN/1.73SQ M
GLUCOSE SERPL-MCNC: 228 MG/DL (ref 65–140)
HDLC SERPL-MCNC: 36 MG/DL
LDLC SERPL CALC-MCNC: 95 MG/DL (ref 0–100)
POTASSIUM SERPL-SCNC: 3.6 MMOL/L (ref 3.5–5.3)
PROCALCITONIN SERPL-MCNC: 0.09 NG/ML
SODIUM SERPL-SCNC: 142 MMOL/L (ref 136–145)
TRIGL SERPL-MCNC: 52 MG/DL

## 2021-08-28 PROCEDURE — 80048 BASIC METABOLIC PNL TOTAL CA: CPT | Performed by: INTERNAL MEDICINE

## 2021-08-28 PROCEDURE — 99232 SBSQ HOSP IP/OBS MODERATE 35: CPT | Performed by: PHYSICIAN ASSISTANT

## 2021-08-28 PROCEDURE — 99232 SBSQ HOSP IP/OBS MODERATE 35: CPT | Performed by: INTERNAL MEDICINE

## 2021-08-28 PROCEDURE — 94760 N-INVAS EAR/PLS OXIMETRY 1: CPT

## 2021-08-28 PROCEDURE — 84145 PROCALCITONIN (PCT): CPT | Performed by: PHYSICIAN ASSISTANT

## 2021-08-28 PROCEDURE — 82728 ASSAY OF FERRITIN: CPT | Performed by: STUDENT IN AN ORGANIZED HEALTH CARE EDUCATION/TRAINING PROGRAM

## 2021-08-28 PROCEDURE — 80061 LIPID PANEL: CPT | Performed by: NURSE PRACTITIONER

## 2021-08-28 PROCEDURE — 83550 IRON BINDING TEST: CPT | Performed by: STUDENT IN AN ORGANIZED HEALTH CARE EDUCATION/TRAINING PROGRAM

## 2021-08-28 PROCEDURE — 82607 VITAMIN B-12: CPT | Performed by: STUDENT IN AN ORGANIZED HEALTH CARE EDUCATION/TRAINING PROGRAM

## 2021-08-28 PROCEDURE — 99233 SBSQ HOSP IP/OBS HIGH 50: CPT | Performed by: STUDENT IN AN ORGANIZED HEALTH CARE EDUCATION/TRAINING PROGRAM

## 2021-08-28 PROCEDURE — 94664 DEMO&/EVAL PT USE INHALER: CPT

## 2021-08-28 PROCEDURE — 83540 ASSAY OF IRON: CPT | Performed by: STUDENT IN AN ORGANIZED HEALTH CARE EDUCATION/TRAINING PROGRAM

## 2021-08-28 PROCEDURE — 94640 AIRWAY INHALATION TREATMENT: CPT

## 2021-08-28 PROCEDURE — 82746 ASSAY OF FOLIC ACID SERUM: CPT | Performed by: STUDENT IN AN ORGANIZED HEALTH CARE EDUCATION/TRAINING PROGRAM

## 2021-08-28 RX ORDER — HEPARIN SODIUM 5000 [USP'U]/ML
5000 INJECTION, SOLUTION INTRAVENOUS; SUBCUTANEOUS EVERY 8 HOURS SCHEDULED
Status: DISCONTINUED | OUTPATIENT
Start: 2021-08-28 | End: 2021-08-30 | Stop reason: HOSPADM

## 2021-08-28 RX ORDER — FUROSEMIDE 20 MG/1
20 TABLET ORAL DAILY
Status: DISCONTINUED | OUTPATIENT
Start: 2021-08-29 | End: 2021-08-30 | Stop reason: HOSPADM

## 2021-08-28 RX ADMIN — LEVALBUTEROL HYDROCHLORIDE 1.25 MG: 1.25 SOLUTION, CONCENTRATE RESPIRATORY (INHALATION) at 14:09

## 2021-08-28 RX ADMIN — LEVALBUTEROL HYDROCHLORIDE 1.25 MG: 1.25 SOLUTION, CONCENTRATE RESPIRATORY (INHALATION) at 08:13

## 2021-08-28 RX ADMIN — FUROSEMIDE 40 MG: 40 TABLET ORAL at 08:59

## 2021-08-28 RX ADMIN — IPRATROPIUM BROMIDE 0.5 MG: 0.5 SOLUTION RESPIRATORY (INHALATION) at 20:11

## 2021-08-28 RX ADMIN — CARVEDILOL 6.25 MG: 6.25 TABLET, FILM COATED ORAL at 17:09

## 2021-08-28 RX ADMIN — CEFTRIAXONE SODIUM 1000 MG: 10 INJECTION, POWDER, FOR SOLUTION INTRAVENOUS at 07:05

## 2021-08-28 RX ADMIN — AZITHROMYCIN MONOHYDRATE 250 MG: 250 TABLET ORAL at 08:59

## 2021-08-28 RX ADMIN — METHYLPREDNISOLONE SODIUM SUCCINATE 40 MG: 40 INJECTION, POWDER, FOR SOLUTION INTRAMUSCULAR; INTRAVENOUS at 08:59

## 2021-08-28 RX ADMIN — LEVALBUTEROL HYDROCHLORIDE 1.25 MG: 1.25 SOLUTION, CONCENTRATE RESPIRATORY (INHALATION) at 20:11

## 2021-08-28 RX ADMIN — CARVEDILOL 6.25 MG: 6.25 TABLET, FILM COATED ORAL at 08:22

## 2021-08-28 RX ADMIN — IPRATROPIUM BROMIDE 0.5 MG: 0.5 SOLUTION RESPIRATORY (INHALATION) at 14:09

## 2021-08-28 RX ADMIN — PRAVASTATIN SODIUM 20 MG: 20 TABLET ORAL at 17:09

## 2021-08-28 RX ADMIN — HEPARIN SODIUM 5000 UNITS: 5000 INJECTION INTRAVENOUS; SUBCUTANEOUS at 13:58

## 2021-08-28 RX ADMIN — IPRATROPIUM BROMIDE 0.5 MG: 0.5 SOLUTION RESPIRATORY (INHALATION) at 08:13

## 2021-08-28 RX ADMIN — ASPIRIN 81 MG: 81 TABLET, COATED ORAL at 08:59

## 2021-08-28 RX ADMIN — Medication 1 TABLET: at 08:22

## 2021-08-28 RX ADMIN — VILAZODONE HYDROCHLORIDE 40 MG: 20 TABLET ORAL at 08:25

## 2021-08-28 RX ADMIN — METHYLPREDNISOLONE SODIUM SUCCINATE 40 MG: 40 INJECTION, POWDER, FOR SOLUTION INTRAMUSCULAR; INTRAVENOUS at 20:56

## 2021-08-28 RX ADMIN — HEPARIN SODIUM 5000 UNITS: 5000 INJECTION INTRAVENOUS; SUBCUTANEOUS at 21:00

## 2021-08-28 NOTE — ASSESSMENT & PLAN NOTE
Malnutrition Findings:   Adult Malnutrition type: Chronic illness  Adult Degree of Malnutrition: Other severe protein calorie malnutrition    BMI Findings:  Adult BMI Classifications: Underweight < 18 5     Body mass index is 16 13 kg/m²     · Add ensure  · Secondary to severe COPD

## 2021-08-28 NOTE — MALNUTRITION/BMI
This medical record reflects one or more clinical indicators suggestive of malnutrition and/or morbid obesity  Malnutrition Findings:   Adult Malnutrition type: Chronic illness  Adult Degree of Malnutrition: Other severe protein calorie malnutrition  Malnutrition Characteristics: Fat loss, Muscle loss, Inadequate energy     Malnutrition related to inadequate energy intake as evidenced by temporal wasting, sternum and clavicle protruding/visible, subcutaneous fat loss orbital/cheek region and extremities, <75% energy intake compared to estimated needs > 1 month  Recommend chocolate ensure compact BID  BMI Findings:  Adult BMI Classifications: Underweight < 18 5     Body mass index is 16 13 kg/m²  See Nutrition note dated 08/28/2021 for additional details  Completed nutrition assessment is viewable in the nutrition documentation

## 2021-08-28 NOTE — ASSESSMENT & PLAN NOTE
· Meeting criteria due to tachycardia, tachypnea most likely in setting of pulmonary etiology  · Started on IV antibiotics, follow up infectious work up

## 2021-08-28 NOTE — PROGRESS NOTES
Cardiology Progress Note - Brandie Priest 77 y o  female MRN: [id number]    Unit/Bed#: -01 Encounter: [id number]      Assessment:    Acute diastolic CHF, mild  COPD exacerbation  CAD s/p PCI with DANIELLA to mRCA  Hypertension  Hyperlipidemia    Plan:    Trop negative  Echo shows preserved LV function and wall motion  Appears euvolemic  Switch lasix to 20 mg po daily    CAD: Cont with ASA, beta blocker and statin    Tobacco abuse: Encourage cessation    CP: Atypical  Resolved  Likely in setting of COPD exacerbration  Patient to establish care with OP cardiologist  Consider OP stress testing if needed    Cardiology service will sign off  Please call with questions  Please arrange follow up with cardiology service in 2 - 3 weeks  Subjective:   Patient seen and examined  No significant events overnight  Denies chest pain, pnd, orthopnea, denies abdominal pain, nausea    She is a past heavy tobacco user and currently smokes 1-2 cigarettes a day  She has followed up with cardiology in the past and was lost to follow-up  She states she has been noncompliant with aspirin due to nose bleeds and has not taken statins due to muscle cramps  Echo 8/27/2021:  LEFT VENTRICLE:  Systolic function was normal  Ejection fraction was estimated to be 60 %  There were no regional wall motion abnormalities  There was mild concentric hypertrophy  Doppler parameters were consistent with abnormal left ventricular relaxation (grade 1 diastolic dysfunction)      RIGHT VENTRICLE:  The size was normal   Systolic function was normal      LEFT ATRIUM:  The atrium was mildly dilated      MITRAL VALVE:  There was mild to moderate annular calcification  There was trace to mild regurgitation      TRICUSPID VALVE:  There was trace to mild regurgitation      IVC, HEPATIC VEINS:  The inferior vena cava was dilated  The respirophasic change in diameter was more than 50%      Objective:     Vitals: Blood pressure 138/63, pulse 96, temperature 99 1 °F (37 3 °C), resp  rate 18, height 5' 7" (1 702 m), weight 46 7 kg (103 lb), SpO2 92 %, not currently breastfeeding , Body mass index is 16 13 kg/m² ,   Orthostatic Blood Pressures      Most Recent Value   Blood Pressure  138/63 filed at 08/28/2021 0702   Patient Position - Orthostatic VS  Lying filed at 08/27/2021 1529            Intake/Output Summary (Last 24 hours) at 8/28/2021 1102  Last data filed at 8/28/2021 1029  Gross per 24 hour   Intake 880 ml   Output 250 ml   Net 630 ml       No significant arrhythmias seen on telemetry review   NSR no significant arrhythmias    Physical Exam:    GEN: Marilou Bedolla appears well, alert and oriented x 3, pleasant and cooperative   HEENT: anicteric, mucous membranes moist  NECK: no jvd, carotid bruits   HEART: regular rhythm, normal S1 and S2, no murmurs, clicks, gallops or rubs   LUNGS: clear to auscultation bilaterally; no wheezes, rales, or rhonchi   ABDOMEN: normal bowel sounds, soft, no tenderness, no distention  EXTREMITIES: peripheral pulses normal; no clubbing, cyanosis, or edema  NEURO: no focal findings   SKIN: normal without suspicious lesions on exposed skin      Current Facility-Administered Medications:     acetaminophen (TYLENOL) tablet 650 mg, 650 mg, Oral, Q6H PRN, Kelly Peña PA-C    albuterol (PROVENTIL HFA,VENTOLIN HFA) inhaler 2 puff, 2 puff, Inhalation, Q4H PRN, Kelly Peña PA-C    aspirin (ECOTRIN LOW STRENGTH) EC tablet 81 mg, 81 mg, Oral, Daily, TAMMIE Terrell, 81 mg at 08/28/21 0859    azithromycin (ZITHROMAX) tablet 250 mg, 250 mg, Oral, Q24H, TAMMIE Terrell, 250 mg at 08/28/21 5397    calcium carbonate-vitamin D (OSCAL-D) 500 mg-200 units per tablet 1 tablet, 1 tablet, Oral, Daily With Breakfast, Kelly Peña PA-C, 1 tablet at 08/28/21 0085    carvedilol (COREG) tablet 6 25 mg, 6 25 mg, Oral, BID With Meals, TAMMIE Terrell, 6 25 mg at 08/28/21 7558    ceftriaxone (ROCEPHIN) 1 g/50 mL in dextrose IVPB, 1,000 mg, Intravenous, Q24H, Stephanie Day PA-C, Stopped at 08/28/21 0800    furosemide (LASIX) tablet 40 mg, 40 mg, Oral, Daily, TAMMIE Terrell, 40 mg at 08/28/21 0859    heparin (porcine) subcutaneous injection 5,000 Units, 5,000 Units, Subcutaneous, Q8H [address], Jen Martínez MD    HYDROcodone-acetaminophen [facility]) 5-325 mg per tablet 1 tablet, 1 tablet, Oral, Q8H PRN, Kacey Kidd PA-C, 1 tablet at 08/27/21 0418    ipratropium (ATROVENT) 0 02 % inhalation solution 0 5 mg, 0 5 mg, Nebulization, TID, Jen Martínez MD, 0 5 mg at 08/28/21 0813    levalbuterol ([place]) inhalation solution 1 25 mg, 1 25 mg, Nebulization, TID, Jen Martínez MD, 1 25 mg at 08/28/21 0813    methylPREDNISolone sodium succinate (Solu-MEDROL) injection 40 mg, 40 mg, Intravenous, Q12H [address], Jen Martínez MD, 40 mg at 08/28/21 0859    pravastatin (PRAVACHOL) tablet 20 mg, 20 mg, Oral, Daily With Dinner, TAMMIE Terrell, 20 mg at 08/27/21 1710    vilazodone (VIIBRYD) tablet 40 mg, 40 mg, Oral, Daily With Breakfast, Kacey Kidd PA-C, 40 mg at 08/28/21 0825    Labs & Results:    Lab Results   Component Value Date    TROPONINI <0 02 08/27/2021    TROPONINI <0 02 08/27/2021    TROPONINI <0 02 08/26/2021       Lab Results   Component Value Date    CALCIUM 8 7 08/26/2021    K 3 7 08/26/2021    CO2 38 (H) 08/26/2021     08/26/2021    BUN 15 08/26/2021    CREATININE 0 70 08/26/2021       Lab Results   Component Value Date    WBC 20 45 (H) 08/27/2021    HGB 8 1 (L) 08/27/2021    HCT 27 9 (L) 08/27/2021     (H) 08/27/2021     08/27/2021     Results from last 7 days   Lab Units 08/26/21 2213   INR  1 09       No results found for: CHOL  Lab Results   Component Value Date    HDL 36 (L) 08/28/2021     Lab Results   Component Value Date    LDLCALC 95 08/28/2021     Lab Results   Component Value Date    TRIG 52 08/28/2021       Lab Results   Component Value Date    ALT 10 (L) 08/26/2021    AST 13 08/26/2021

## 2021-08-28 NOTE — PROGRESS NOTES
Progress Note - Pulmonary   Terrie Jefferson 77 y o  female MRN: [id number]  Unit/Bed#: -01 Encounter: [id number]    Assessment:  Chronic hypoxic respiratory failure  COPD with acute exacerbation  Abnormal chest CT  Nicotine dependence    Plan:  Patient currently on 3 L by nasal cannula typically uses 5 L at baseline  Abnormal chest imaging on admission concerning for pulmonary edema versus possible superimposed infection however 2nd procalcitonin was negative  Will discontinue ceftriaxone and continue with azithromycin  Continue IV Solu-Medrol, consider transition to prednisone tomorrow  Continue ATC nebs  NRT    Patient would like our information to possibly follow-up and scheduled appointment after discharge  She would benefit from pulmonary rehab    Will need follow-up CT in 4 weeks    Discussed with SLIM and nursing    Subjective:   Patient says that her breathing is better  She mainly complains about needle sticks  Objective:     Vitals: Blood pressure 138/63, pulse 96, temperature 99 1 °F (37 3 °C), resp  rate 18, height 5' 7" (1 702 m), weight 46 7 kg (103 lb), SpO2 92 %, not currently breastfeeding  ,Body mass index is 16 13 kg/m²  Intake/Output Summary (Last 24 hours) at 8/28/2021 1058  Last data filed at 8/28/2021 1029  Gross per 24 hour   Intake 880 ml   Output 250 ml   Net 630 ml       Invasive Devices     Peripheral Intravenous Line            Peripheral IV 08/26/21 Left Antecubital 1 day                Physical Exam:  General appearance: Alert and oriented, in no acute distress  Head: Normocephalic, without obvious abnormality, atraumatic  Eyes: EOMI  No discharge bilaterally  No scleral icterus     Neck: Supple, symmetrical, trachea midline  Lungs:  Decreased breath sounds  Heart: Regular rate and rhythm, S1, S2 normal, no murmur  Abdomen:  No appreciable distension or tenderness  Extremities:  no edema or tenderness  Skin: Warm and dry  Neurologic: No acute focal deficits are noted    Labs: I have personally reviewed pertinent lab results  Imaging and other studies: I have personally reviewed pertinent reports

## 2021-08-28 NOTE — ASSESSMENT & PLAN NOTE
· 1 of 2 blood cultures +  · Unclear if true infection vs contaminant, but initial procalcitonin negative, so inclined to suspect contaminant  · On ceftriaxone and azithromycin for possible community acquired pneumonia  · Follow up culture speciation

## 2021-08-28 NOTE — ASSESSMENT & PLAN NOTE
Results from last 7 days   Lab Units 08/27/21  0414 08/26/21  2213   HEMOGLOBIN g/dL 8 1* 8 2*     · Stable, continue DVT prophylaxis  · Likely anemia of chronic disease, check iron panel, folic acid, L27

## 2021-08-28 NOTE — PROGRESS NOTES
[address] [facility]  Progress Note Marcia Oark 1945, 77 y o  female MRN: [id number]  Unit/Bed#: -01 Encounter: [id number]  Primary Care Provider: Bhavana Campoverde MD   Date and time admitted to hospital: 8/26/2021  9:22 PM      Sepsis, POA, due to possible PNA evidenced by Wbc 26 20; Tachycardia (HR 95);  Tachypnea (RR24) treated with IV Rocephin, small IVF bolus and blood cultures    Severe protein-calorie malnutrition in setting of chronic illness related to inadequate energy intake as evidenced by temporal wasting, sternum and clavicle protruding/visible, subcutaneous fat loss orbital/cheek region and extremities, <75% energy intake compared to estimated needs > 1 month treated with supplements, diet and dietician assessment on  08/28/2021    Positive blood culture  Assessment & Plan  · 1 of 2 blood cultures +  · Unclear if true infection vs contaminant, but initial procalcitonin negative, so inclined to suspect contaminant  · On ceftriaxone and azithromycin for possible community acquired pneumonia      COPD with acute exacerbation ([facility] )  Assessment & Plan  · Multifactorial in setting of COPD exacerbation with possible infection  · Initial procalcitonin negative, 2nd in process  · Continue ceftriaxone and azithromycin for now  · Transition IV solumedrol to prednisone, coordinate with pulmonology   · Monitor respiratory status, continue nebulizer treatments     * Acute congestive heart failure ([facility] )  Assessment & Plan  Wt Readings from Last 3 Encounters:   08/28/21 46 7 kg (103 lb)   12/27/19 56 7 kg (125 lb)   05/06/19 66 2 kg (145 lb 15 1 oz)   · BNP elevated at 1728  · CT a revealing pulmonary edema, no clinical signs of volume overload though  · Echocardiogram showing grade 1 diastolic heart failure  · Cardiology was consulted, see recommendations   · Net fluid status +1 liter  · Given dose of IV lasix in ED, now transitioned to PO lasix  · CHF exacerbation resolved     Severe protein-calorie malnutrition ([facility] )  Assessment & Plan  Malnutrition Findings:   Adult Malnutrition type: Chronic illness  Adult Degree of Malnutrition: Other severe protein calorie malnutrition    BMI Findings:  Adult BMI Classifications: Underweight < 18 5     Body mass index is 16 13 kg/m²  · Add ensure  · Secondary to severe COPD    Anemia  Assessment & Plan  Results from last 7 days   Lab Units 21  0414 21  2213   HEMOGLOBIN g/dL 8 1* 8 2*     · Stable, continue DVT prophylaxis  · Likely anemia of chronic disease, check iron panel, folic acid, L31    SIRS (systemic inflammatory response syndrome) (HCC)  Assessment & Plan  · Meeting criteria due to tachycardia, tachypnea most likely in setting of pulmonary etiology  · Started on IV antibiotics, follow up infectious work up    Chest pain-resolved as of 2021  Assessment & Plan  · ACS ruled out with serial troponins         VTE Pharmacologic Prophylaxis: VTE Score: 4 Moderate Risk (Score 3-4) - Pharmacological DVT Prophylaxis Ordered: heparin  Patient Centered Rounds: I performed bedside rounds with nursing staff today  Discussions with Specialists or Other Care Team Provider: Pulmonology     Education and Discussions with Family / Patient: Patient declined call to   Time Spent for Care: 30 minutes  More than 50% of total time spent on counseling and coordination of care as described above  Current Length of Stay: 1 day(s)  Current Patient Status: Inpatient   Certification Statement: The patient will continue to require additional inpatient hospital stay due to + blood cultures  Discharge Plan: Anticipate discharge in 24-48 hrs to home  Code Status: Level 1 - Full Code    Subjective:   No chest pain or chest palpitations  No shortness of breath  Appetite is good         Objective:     Vitals:   Temp (24hrs), Av 6 °F (37 °C), Min:98 1 °F (36 7 °C), Max:99 1 °F (37 3 °C)    Temp:  [98 1 °F (36 7 °C)-99 1 °F (37 3 °C)] 99 1 °F (37 3 °C)  HR:  [89-96] 96  Resp:  [17-20] 18  BP: (120-142)/(63-73) 138/63  SpO2:  [92 %-100 %] 92 %  Body mass index is 16 13 kg/m²  Input and Output Summary (last 24 hours): Intake/Output Summary (Last 24 hours) at 8/28/2021 1019  Last data filed at 8/28/2021 0430  Gross per 24 hour   Intake 700 ml   Output 250 ml   Net 450 ml       Physical Exam:   Physical Exam  Vitals and nursing note reviewed  Constitutional:       Appearance: Normal appearance  HENT:      Head: Normocephalic and atraumatic  Right Ear: External ear normal       Left Ear: External ear normal       Nose: No congestion or rhinorrhea  Mouth/Throat:      Mouth: Mucous membranes are dry  Pharynx: Oropharynx is clear  Eyes:      General:         Right eye: No discharge  Left eye: No discharge  Cardiovascular:      Rate and Rhythm: Normal rate and regular rhythm  Pulmonary:      Effort: Pulmonary effort is normal  No respiratory distress  Abdominal:      General: Abdomen is flat  Palpations: Abdomen is soft  Musculoskeletal:      Cervical back: Normal range of motion and neck supple  Right lower leg: No edema  Left lower leg: No edema  Skin:     General: Skin is warm and dry  Neurological:      General: No focal deficit present  Mental Status: She is alert  Mental status is at baseline     Psychiatric:         Mood and Affect: Mood normal          Behavior: Behavior normal           Additional Data:     Labs:  Results from last 7 days   Lab Units 08/27/21  0414 08/26/21  2213   WBC Thousand/uL 20 45* 26 20*   HEMOGLOBIN g/dL 8 1* 8 2*   HEMATOCRIT % 27 9* 28 4*   PLATELETS Thousands/uL 183 188   BANDS PCT %  --  2   LYMPHO PCT %  --  67*   MONO PCT %  --  6   EOS PCT %  --  1     Results from last 7 days   Lab Units 08/26/21  2213   SODIUM mmol/L 143   POTASSIUM mmol/L 3 7   CHLORIDE mmol/L 102   CO2 mmol/L 38*   BUN mg/dL 15   CREATININE mg/dL 0 70   ANION GAP mmol/L 3* CALCIUM mg/dL 8 7   ALBUMIN g/dL 2 6*   TOTAL BILIRUBIN mg/dL 0 32   ALK PHOS U/L 130*   ALT U/L 10*   AST U/L 13   GLUCOSE RANDOM mg/dL 114     Results from last 7 days   Lab Units 08/26/21  2213   INR  1 09             Results from last 7 days   Lab Units 08/27/21  0324   LACTIC ACID mmol/L 0 6   PROCALCITONIN ng/ml 0 06       Lines/Drains:  Invasive Devices     Peripheral Intravenous Line            Peripheral IV 08/26/21 Left Antecubital 1 day                  Telemetry:  Telemetry Orders (From admission, onward)             48 Hour Telemetry Monitoring  Continuous x 48 hours     Question:  Reason for 48 Hour Telemetry  Answer:  Acute MI, chest pain - R/O MI, or unstable angina                 Telemetry Reviewed: Normal Sinus Rhythm  Indication for Continued Telemetry Use: No indication for continued use  Will discontinue  Imaging: Reviewed radiology reports from this admission including: chest CT scan    Recent Cultures (last 7 days):   Results from last 7 days   Lab Units 08/27/21  0324   BLOOD CULTURE  No Growth at 24 hrs  Received in Microbiology Lab  Culture in Progress     GRAM STAIN RESULT  Gram positive cocci in clusters*       Last 24 Hours Medication List:   Current Facility-Administered Medications   Medication Dose Route Frequency Provider Last Rate    acetaminophen  650 mg Oral Q6H PRN Kelly Peña PA-C      albuterol  2 puff Inhalation Q4H PRN Kelly Peña PA-C      aspirin  81 mg Oral Daily TAMMIE Gresham      azithromycin  250 mg Oral Q24H TAMMIE Gresham      calcium carbonate-vitamin D  1 tablet Oral Daily With Breakfast Kelly Peña PA-C      carvedilol  6 25 mg Oral BID With Meals TAMMIE Gresham      cefTRIAXone  1,000 mg Intravenous Q24H Kelly Peña PA-C Stopped (08/28/21 0800)    furosemide  40 mg Oral Daily TAMMIE Terrell      heparin (porcine)  5,000 Units Subcutaneous Q8H [address] Rosendo Carty MD     [employer] HYDROcodone-acetaminophen  1 tablet Oral Q8H PRN Júnior Foote PA-C      ipratropium  0 5 mg Nebulization TID Charisse Landaverde MD      levalbuterol  1 25 mg Nebulization TID Charisse Landaverde MD      methylPREDNISolone sodium succinate  40 mg Intravenous Q12H [address] Charisse Landaverde MD      pravastatin  20 mg Oral Daily With Bank  Mariella, CRNP      vilazodone  40 mg Oral Daily With Breakfast Júnior Foote PA-C          Today, Patient Was Seen By: Che Cruz MD    **Please Note: This note may have been constructed using a voice recognition system  **

## 2021-08-28 NOTE — RESPIRATORY THERAPY NOTE
08/27/21 2002   Inhalation Therapy Tx   $ Inhalation Therapy Performed Yes   $ Pulse Oximetry Spot Check Charge Completed   SpO2 100 %   Pre-Treatment Pulse 86   Pre-Treatment Respirations 20   Duration 9   Breath Sounds Pre-Treatment Bilateral Clear;Diminished   Delivery Source UDN   Position Semi Rangel's   Treatment Tolerance Tolerated well   Resp Comments even non labored respirations, bilat clear but dimin bs, weaned o2 from 3L to 2L, continue to monitor and give meds as ordered

## 2021-08-28 NOTE — ASSESSMENT & PLAN NOTE
· Multifactorial in setting of COPD exacerbation with possible infection  · Initial procalcitonin negative, 2nd in process  · Continue ceftriaxone and azithromycin for now  · Transition IV solumedrol to prednisone, coordinate with pulmonology   · Monitor respiratory status, continue nebulizer treatments

## 2021-08-28 NOTE — PLAN OF CARE
Diet education provided  Problem: Nutrition/Hydration-ADULT  Goal: Nutrient/Hydration intake appropriate for improving, restoring or maintaining nutritional needs  Description: Monitor and assess patient's nutrition/hydration status for malnutrition  Collaborate with interdisciplinary team and initiate plan and interventions as ordered  Monitor patient's weight and dietary intake as ordered or per policy  Utilize nutrition screening tool and intervene as necessary  Determine patient's food preferences and provide high-protein, high-caloric foods as appropriate       INTERVENTIONS:  - Monitor oral intake, urinary output, labs, and treatment plans  - Assess nutrition and hydration status and recommend course of action  - Evaluate amount of meals eaten  - Assist patient with eating if necessary   - Allow adequate time for meals  - Recommend/ encourage appropriate diets, oral nutritional supplements, and vitamin/mineral supplements  - Order, calculate, and assess calorie counts as needed  - Recommend, monitor, and adjust tube feedings and TPN/PPN based on assessed needs  - Assess need for intravenous fluids  - Provide specific nutrition/hydration education as appropriate  - Include patient/family/caregiver in decisions related to nutrition  Outcome: Progressing

## 2021-08-28 NOTE — ASSESSMENT & PLAN NOTE
Wt Readings from Last 3 Encounters:   08/28/21 46 7 kg (103 lb)   12/27/19 56 7 kg (125 lb)   05/06/19 66 2 kg (145 lb 15 1 oz)   · BNP elevated at 1728  · CT a revealing pulmonary edema, no clinical signs of volume overload though  · Echocardiogram showing grade 1 diastolic heart failure  · Cardiology was consulted, see recommendations   · Net fluid status +1 liter  · Given dose of IV lasix in ED, now transitioned to PO lasix  · CHF exacerbation resolved

## 2021-08-29 PROBLEM — I50.9 ACUTE CONGESTIVE HEART FAILURE (HCC): Status: RESOLVED | Noted: 2021-08-27 | Resolved: 2021-08-29

## 2021-08-29 LAB
FERRITIN SERPL-MCNC: 614 NG/ML (ref 8–388)
FOLATE SERPL-MCNC: >20 NG/ML (ref 3.1–17.5)
IRON SATN MFR SERPL: 34 %
IRON SERPL-MCNC: 55 UG/DL (ref 50–170)
TIBC SERPL-MCNC: 160 UG/DL (ref 250–450)
VIT B12 SERPL-MCNC: 833 PG/ML (ref 100–900)

## 2021-08-29 PROCEDURE — 94640 AIRWAY INHALATION TREATMENT: CPT

## 2021-08-29 PROCEDURE — 99232 SBSQ HOSP IP/OBS MODERATE 35: CPT | Performed by: STUDENT IN AN ORGANIZED HEALTH CARE EDUCATION/TRAINING PROGRAM

## 2021-08-29 PROCEDURE — 94760 N-INVAS EAR/PLS OXIMETRY 1: CPT

## 2021-08-29 RX ORDER — METHYLPREDNISOLONE SODIUM SUCCINATE 40 MG/ML
40 INJECTION, POWDER, LYOPHILIZED, FOR SOLUTION INTRAMUSCULAR; INTRAVENOUS ONCE
Status: COMPLETED | OUTPATIENT
Start: 2021-08-30 | End: 2021-08-30

## 2021-08-29 RX ADMIN — VILAZODONE HYDROCHLORIDE 40 MG: 20 TABLET ORAL at 08:40

## 2021-08-29 RX ADMIN — HEPARIN SODIUM 5000 UNITS: 5000 INJECTION INTRAVENOUS; SUBCUTANEOUS at 05:46

## 2021-08-29 RX ADMIN — CARVEDILOL 6.25 MG: 6.25 TABLET, FILM COATED ORAL at 17:09

## 2021-08-29 RX ADMIN — LEVALBUTEROL HYDROCHLORIDE 1.25 MG: 1.25 SOLUTION, CONCENTRATE RESPIRATORY (INHALATION) at 07:15

## 2021-08-29 RX ADMIN — IPRATROPIUM BROMIDE 0.5 MG: 0.5 SOLUTION RESPIRATORY (INHALATION) at 07:15

## 2021-08-29 RX ADMIN — Medication 1 TABLET: at 08:44

## 2021-08-29 RX ADMIN — LEVALBUTEROL HYDROCHLORIDE 1.25 MG: 1.25 SOLUTION, CONCENTRATE RESPIRATORY (INHALATION) at 21:18

## 2021-08-29 RX ADMIN — CARVEDILOL 6.25 MG: 6.25 TABLET, FILM COATED ORAL at 08:40

## 2021-08-29 RX ADMIN — IPRATROPIUM BROMIDE 0.5 MG: 0.5 SOLUTION RESPIRATORY (INHALATION) at 13:15

## 2021-08-29 RX ADMIN — PRAVASTATIN SODIUM 20 MG: 20 TABLET ORAL at 17:09

## 2021-08-29 RX ADMIN — ASPIRIN 81 MG: 81 TABLET, COATED ORAL at 08:39

## 2021-08-29 RX ADMIN — IPRATROPIUM BROMIDE 0.5 MG: 0.5 SOLUTION RESPIRATORY (INHALATION) at 21:18

## 2021-08-29 RX ADMIN — FUROSEMIDE 20 MG: 20 TABLET ORAL at 08:39

## 2021-08-29 RX ADMIN — METHYLPREDNISOLONE SODIUM SUCCINATE 40 MG: 40 INJECTION, POWDER, FOR SOLUTION INTRAMUSCULAR; INTRAVENOUS at 08:40

## 2021-08-29 RX ADMIN — HEPARIN SODIUM 5000 UNITS: 5000 INJECTION INTRAVENOUS; SUBCUTANEOUS at 15:24

## 2021-08-29 RX ADMIN — LEVALBUTEROL HYDROCHLORIDE 1.25 MG: 1.25 SOLUTION, CONCENTRATE RESPIRATORY (INHALATION) at 13:15

## 2021-08-29 RX ADMIN — AZITHROMYCIN MONOHYDRATE 250 MG: 250 TABLET ORAL at 08:39

## 2021-08-29 NOTE — ASSESSMENT & PLAN NOTE
· Meeting criteria due to tachycardia, tachypnea most likely in setting of pulmonary etiology  · Started on IV antibiotics, Blood cultures are + but suspecting contaminant given negative procalcitonin   · Continue azithromycin for COPD exacerbation, discontinue all other antibiotics

## 2021-08-29 NOTE — CASE MANAGEMENT
Case Management Assessment & Discharge Planning Note    Patient name Wandy Mccrary  Location [facility] [address] [id number] MRN [id number]  : 1945 Date 2021       Current Admission Date: 2021  Current Admission Diagnosis:  Acute congestive heart failure [facility])   Patient Active Problem List   Diagnosis    Ambulatory dysfunction    Hypokalemia    Essential hypertension    Tobacco abuse    Acute on chronic respiratory failure with hypoxia (HCC)    CAD (coronary artery disease), native coronary artery    Flank pain    Pneumonia    Sepsis ([facility] )    Major depressive disorder, recurrent episode, moderate (HCC)    Fatigue    Acute congestive heart failure ([facility] )    COPD with acute exacerbation (HCC)    SIRS (systemic inflammatory response syndrome) (HCC)    Anemia    Abnormal computed tomography angiography (CTA)    Severe protein-calorie malnutrition (HCC)    Positive blood culture    Previous Admission - Discharge Date:3/30/21   LOS (days): 2  Geometric Mean LOS (GMLOS) (days): 4 00  Days to GMLOS:1 5 Previous Discharge Diagnosis:  There are no discharge diagnoses documented for the most recent discharge         Risk of Unplanned Readmission Score  Predictive Model Details          18 (Low)  Factor Value    Calculated 2021 12:03 29% Number of active Rx orders 40    Risk of Unplanned Readmission Model 12% Number of ED visits in last six months 2     10% ECG/EKG order present in last 6 months     8% Diagnosis of electrolyte disorder present     7% Imaging order present in last 6 months     6% Latest hemoglobin low (8 1 g/dL)     6% Age 77     5% Charlson Comorbidity Index 4     5% Diagnosis of deficiency anemia present     5% Active anticoagulant Rx order present     5% Active corticosteroid Rx order present     3% Current length of stay 2 43 days         BUNDLE:      Reason for Referral:    OBJECTIVE:  Pt is a 77y o  year old , white or  [1], female with Buddhism preference of None admitted on 8/26/2021  9:22 PM  Pt is admitted to [id number] [phone] at [facility] with complaints of Acute congestive heart failure ([address] )   Current admission status: Inpatient       Preferred Pharmacy:   [facility] [place] [address] [facility] [address] [facility] [phone] [facility] [address]  Phone: [phone] Fax: [phone]    Primary Care Provider: Mary Bustillos MD    Primary Insurance: MEDICARE  Secondary Insurance: Cemassimo Uribe    ASSESSMENT:  [address], [address] [facility]   Primary Phone: [phone] [facility])                         [facility] of Residence: [place]    Readmission Root Cause  30 Day Readmission: No    Patient Information  Mental Status: Alert  Primary Caregiver: Self  Support Systems: Children, Spouse/significant other  Type of Current Residence: Private residence  Living Arrangements: Lives w/ Children              Patient Information Continued  Income Source: Pension/senior living         Means of Transportation  Means of Transport to Appts[de-identified] Family transport    DISCHARGE DETAILS:    Discharge planning discussed with[de-identified] Patient  Freedom of Choice: Yes    Contacts  Patient Contacts: Gareth Jane - son  Realtionship to Patient[de-identified] Family  Contact Method: Phone  Phone Number: [phone]  Reason/Outcome: Emergency Contact       CM met with patient with introduction and to discuss d/c planning  Pt reports she lives with son in [facility] w/railing  Pt reports son Sobia Lopez is emergency contact/health care representative  Reports that she is retired, independent in ADLs/IAdLs at baseline  Pt reports she drives  Reports son will transport @ discharge  Hx depression; denies inpatient treatment  Denies hx substance abuse treatment  Has RW but does not use to ambulate  On 2L O2 @ baseline  Hx HHC  Declines HHC at this time  Hx Kay  Reports bad experience and declines rehab   Pt made aware that these are not recommendations at this time, CM will discuss with her if this becomes the recommendation  Preferred [address]  Pt made aware that [employer] added to follow up providers for assistance with identifying a new PCP who accepts her insurance  Pt stated that she is no longer in need of a new PCP because her friend has helped her with finding one previously  CM will continue to follow for discharge needs  CM reviewed d/c planning process including the following: identifying help at home, patient preference for d/c planning needs, Discharge Lounge, [facility] Meds to Bed program, availability of treatment team to discuss questions or concerns patient and/or family may have regarding understanding medications and recognizing signs and symptoms once discharged  CM also encouraged patient to follow up with all recommended appointments after discharge  Patient advised of importance for patient and family to participate in managing patients medical well being

## 2021-08-29 NOTE — ASSESSMENT & PLAN NOTE
Wt Readings from Last 3 Encounters:   08/29/21 44 5 kg (98 lb 1 7 oz)   12/27/19 56 7 kg (125 lb)   05/06/19 66 2 kg (145 lb 15 1 oz)   · BNP elevated at 1728  · CT a revealing pulmonary edema, no clinical signs of volume overload though  · Echocardiogram showing grade 1 diastolic heart failure  · Cardiology was consulted, see recommendations   · Net fluid status +1 liter  · Given dose of IV lasix in ED, now transitioned to PO lasix  · CHF exacerbation resolved

## 2021-08-29 NOTE — PROGRESS NOTES
[address] [facility]  Progress Note Harjinder Steve 1945, 77 y o  female MRN: [id number]  Unit/Bed#: -01 Encounter: [id number]  Primary Care Provider: Merle Pisano MD   Date and time admitted to hospital: 8/26/2021  9:22 PM    * Acute congestive heart failure (HCC)-resolved as of 8/29/2021  Assessment & Plan  Wt Readings from Last 3 Encounters:   08/29/21 44 5 kg (98 lb 1 7 oz)   12/27/19 56 7 kg (125 lb)   05/06/19 66 2 kg (145 lb 15 1 oz)   · BNP elevated at 1728  · CT a revealing pulmonary edema, no clinical signs of volume overload though  · Echocardiogram showing grade 1 diastolic heart failure  · Cardiology was consulted, see recommendations   · Net fluid status +1 liter  · Given dose of IV lasix in ED, now transitioned to PO lasix  · CHF exacerbation resolved     Severe protein-calorie malnutrition ([facility] )  Assessment & Plan  Malnutrition Findings:   Adult Malnutrition type: Chronic illness  Adult Degree of Malnutrition: Other severe protein calorie malnutrition    BMI Findings:  Adult BMI Classifications: Underweight < 18 5     Body mass index is 15 37 kg/m²     · Add ensure  · Secondary to severe COPD    Abnormal computed tomography angiography (CTA)  Assessment & Plan  · CT a negative for aneurysm, dissection, but does reveal mediastinal and hilar adenopathy  · Appreciate pulmonology consult  · Also incidental finding of hepatic lesion and hepatomegaly  · Discussed finding with patient and discussed following up outpatient with PCP for an abdomen MRI    Anemia  Assessment & Plan  Results from last 7 days   Lab Units 08/27/21  0414 08/26/21  2213   HEMOGLOBIN g/dL 8 1* 8 2*     · Stable, continue DVT prophylaxis  · Suspecting anemia of chronic disease based on uron studies     SIRS (systemic inflammatory response syndrome) ([facility] )  Assessment & Plan  · Meeting criteria due to tachycardia, tachypnea most likely in setting of pulmonary etiology  · Started on IV antibiotics, Blood cultures are + but suspecting contaminant given negative procalcitonin   · Continue azithromycin for COPD exacerbation, discontinue all other antibiotics         VTE Pharmacologic Prophylaxis: VTE Score: 4 Moderate Risk (Score 3-4) - Pharmacological DVT Prophylaxis Ordered: heparin  Patient Centered Rounds: I performed bedside rounds with nursing staff today  Discussions with Specialists or Other Care Team Provider: meaghan    Education and Discussions with Family / Patient: Patient declined call to   Time Spent for Care: 30 minutes  More than 50% of total time spent on counseling and coordination of care as described above  Current Length of Stay: 2 day(s)  Current Patient Status: Inpatient   Certification Statement: The patient will continue to require additional inpatient hospital stay due to antibiotics, IV steroids  Discharge Plan: Anticipate discharge tomorrow to home  Code Status: Level 1 - Full Code    Subjective:   No chest pain or chest palpitations  No shortness of breath  Appetite is good  Objective:     Vitals:   Temp (24hrs), Av 4 °F (36 9 °C), Min:98 2 °F (36 8 °C), Max:98 9 °F (37 2 °C)    Temp:  [98 2 °F (36 8 °C)-98 9 °F (37 2 °C)] 98 2 °F (36 8 °C)  HR:  [82-89] 83  Resp:  [15-16] 16  BP: (137-152)/(69-79) 149/77  SpO2:  [90 %-97 %] 96 %  Body mass index is 15 37 kg/m²  Input and Output Summary (last 24 hours): Intake/Output Summary (Last 24 hours) at 2021 1433  Last data filed at 2021 1330  Gross per 24 hour   Intake 1240 ml   Output 630 ml   Net 610 ml       Physical Exam:   Physical Exam  Vitals and nursing note reviewed  Constitutional:       Appearance: Normal appearance  HENT:      Head: Normocephalic and atraumatic  Right Ear: External ear normal       Left Ear: External ear normal       Nose: No congestion or rhinorrhea  Mouth/Throat:      Mouth: Mucous membranes are dry  Pharynx: Oropharynx is clear     Eyes: General:         Right eye: No discharge  Left eye: No discharge  Cardiovascular:      Rate and Rhythm: Normal rate and regular rhythm  Pulmonary:      Effort: Pulmonary effort is normal  No respiratory distress  Abdominal:      General: Abdomen is flat  Palpations: Abdomen is soft  Musculoskeletal:      Cervical back: Normal range of motion and neck supple  Right lower leg: No edema  Left lower leg: No edema  Skin:     General: Skin is warm and dry  Neurological:      General: No focal deficit present  Mental Status: She is alert  Mental status is at baseline  Psychiatric:         Mood and Affect: Mood normal          Behavior: Behavior normal           Additional Data:     Labs:  Results from last 7 days   Lab Units 08/27/21  0414 08/26/21  2213   WBC Thousand/uL 20 45* 26 20*   HEMOGLOBIN g/dL 8 1* 8 2*   HEMATOCRIT % 27 9* 28 4*   PLATELETS Thousands/uL 183 188   BANDS PCT %  --  2   LYMPHO PCT %  --  67*   MONO PCT %  --  6   EOS PCT %  --  1     Results from last 7 days   Lab Units 08/28/21  1224 08/26/21  2213   SODIUM mmol/L 142 143   POTASSIUM mmol/L 3 6 3 7   CHLORIDE mmol/L 99* 102   CO2 mmol/L 36* 38*   BUN mg/dL 25 15   CREATININE mg/dL 0 87 0 70   ANION GAP mmol/L 7 3*   CALCIUM mg/dL 9 7 8 7   ALBUMIN g/dL  --  2 6*   TOTAL BILIRUBIN mg/dL  --  0 32   ALK PHOS U/L  --  130*   ALT U/L  --  10*   AST U/L  --  13   GLUCOSE RANDOM mg/dL 228* 114     Results from last 7 days   Lab Units 08/26/21  2213   INR  1 09             Results from last 7 days   Lab Units 08/28/21  0512 08/27/21  0324   LACTIC ACID mmol/L  --  0 6   PROCALCITONIN ng/ml 0 09 0 06       Lines/Drains:  Invasive Devices     Peripheral Intravenous Line            Peripheral IV 08/28/21 Dorsal (posterior); Left Forearm 1 day                      Imaging: No pertinent imaging reviewed      Recent Cultures (last 7 days):   Results from last 7 days   Lab Units 08/27/21  0324   BLOOD CULTURE Staphylococcus hominis*  No Growth at 24 hrs  GRAM STAIN RESULT  Gram positive cocci in clusters*  Gram positive cocci in clusters*       Last 24 Hours Medication List:   Current Facility-Administered Medications   Medication Dose Route Frequency Provider Last Rate    acetaminophen  650 mg Oral Q6H PRN Carol Worrell PA-C      albuterol  2 puff Inhalation Q4H PRN Carol Worrell PA-C      aspirin  81 mg Oral Daily TAMMIE Gresham      azithromycin  250 mg Oral Q24H TAMMIE Gresham      calcium carbonate-vitamin D  1 tablet Oral Daily With Breakfast Carol Worrell PA-C      carvedilol  6 25 mg Oral BID With Meals TAMMIE Gresham      furosemide  20 mg Oral Daily Nicolas Joyce MD      heparin (porcine)  5,000 Units Subcutaneous [facility] Anya Hurtado MD      HYDROcodone-acetaminophen  1 tablet Oral Q8H PRN Carol Worrell PA-C      ipratropium  0 5 mg Nebulization TID Anya Hurtado MD      levalbuterol  1 25 mg Nebulization TID MD Greg[facility] ON 8/30/2021] methylPREDNISolone sodium succinate  40 mg Intravenous Once Anya Hurtado MD      pravastatin  20 mg Oral Daily With [employer]      vilazodone  40 mg Oral Daily With Breakfast Carol Worrell PA-C          Today, Patient Was Seen By: Taco Posadas MD    **Please Note: This note may have been constructed using a voice recognition system  **

## 2021-08-29 NOTE — ASSESSMENT & PLAN NOTE
Results from last 7 days   Lab Units 08/27/21  0414 08/26/21  2213   HEMOGLOBIN g/dL 8 1* 8 2*     · Stable, continue DVT prophylaxis  · Suspecting anemia of chronic disease based on uron studies

## 2021-08-29 NOTE — PLAN OF CARE
Problem: Potential for Falls  Goal: Patient will remain free of falls  Description: INTERVENTIONS:  - Educate patient/family on patient safety including physical limitations  - Instruct patient to call for assistance with activity   - Consult OT/PT to assist with strengthening/mobility   - Keep Call bell within reach  - Keep bed low and locked with side rails adjusted as appropriate  - Keep care items and personal belongings within reach  - Initiate and maintain comfort rounds  - Make Fall Risk Sign visible to staff  - Offer Toileting every 2 Hours, in advance of need  - Initiate/Maintain bedalarm  - Obtain necessary fall risk management equipment:   - Apply yellow socks and bracelet for high fall risk patients  - Consider moving patient to room near nurses station  Outcome: Progressing     Problem: Nutrition/Hydration-ADULT  Goal: Nutrient/Hydration intake appropriate for improving, restoring or maintaining nutritional needs  Description: Monitor and assess patient's nutrition/hydration status for malnutrition  Collaborate with interdisciplinary team and initiate plan and interventions as ordered  Monitor patient's weight and dietary intake as ordered or per policy  Utilize nutrition screening tool and intervene as necessary  Determine patient's food preferences and provide high-protein, high-caloric foods as appropriate       INTERVENTIONS:  - Monitor oral intake, urinary output, labs, and treatment plans  - Assess nutrition and hydration status and recommend course of action  - Evaluate amount of meals eaten  - Assist patient with eating if necessary   - Allow adequate time for meals  - Recommend/ encourage appropriate diets, oral nutritional supplements, and vitamin/mineral supplements  - Order, calculate, and assess calorie counts as needed  - Recommend, monitor, and adjust tube feedings and TPN/PPN based on assessed needs  - Assess need for intravenous fluids  - Provide specific nutrition/hydration education as appropriate  - Include patient/family/caregiver in decisions related to nutrition  Outcome: Progressing     Problem: MOBILITY - ADULT  Goal: Maintain or return to baseline ADL function  Description: INTERVENTIONS:  -  Assess patient's ability to carry out ADLs; assess patient's baseline for ADL function and identify physical deficits which impact ability to perform ADLs (bathing, care of mouth/teeth, toileting, grooming, dressing, etc )  - Assess/evaluate cause of self-care deficits   - Assess range of motion  - Assess patient's mobility; develop plan if impaired  - Assess patient's need for assistive devices and provide as appropriate  - Encourage maximum independence but intervene and supervise when necessary  - Involve family in performance of ADLs  - Assess for home care needs following discharge   - Consider OT consult to assist with ADL evaluation and planning for discharge  - Provide patient education as appropriate  Outcome: Progressing  Goal: Maintains/Returns to pre admission functional level  Description: INTERVENTIONS:  - Perform BMAT or MOVE assessment daily    - Set and communicate daily mobility goal to care team and patient/family/caregiver  - Collaborate with rehabilitation services on mobility goals if consulted  - Perform Range of Motion 2 times a day  - Reposition patient every 3 hours    - Dangle patient 3 times a day  - Stand patient 3 times a day  - Ambulate patient 3 times a day  - Out of bed to chair 3 times a day   - Out of bed for meals 3 times a day  - Out of bed for toileting  - Record patient progress and toleration of activity level   Outcome: Progressing

## 2021-08-29 NOTE — ASSESSMENT & PLAN NOTE
Malnutrition Findings:   Adult Malnutrition type: Chronic illness  Adult Degree of Malnutrition: Other severe protein calorie malnutrition    BMI Findings:  Adult BMI Classifications: Underweight < 18 5     Body mass index is 15 37 kg/m²     · Add ensure  · Secondary to severe COPD

## 2021-08-30 ENCOUNTER — TELEPHONE (OUTPATIENT)
Dept: CARDIOLOGY CLINIC | Facility: CLINIC | Age: 76
End: 2021-08-30

## 2021-08-30 VITALS
HEIGHT: 67 IN | HEART RATE: 89 BPM | WEIGHT: 98.11 LBS | RESPIRATION RATE: 18 BRPM | BODY MASS INDEX: 15.4 KG/M2 | TEMPERATURE: 97.4 F | DIASTOLIC BLOOD PRESSURE: 74 MMHG | SYSTOLIC BLOOD PRESSURE: 156 MMHG | OXYGEN SATURATION: 95 %

## 2021-08-30 LAB
ALBUMIN SERPL BCP-MCNC: 2.5 G/DL (ref 3.5–5)
ALP SERPL-CCNC: 139 U/L (ref 46–116)
ALT SERPL W P-5'-P-CCNC: 12 U/L (ref 12–78)
ANION GAP SERPL CALCULATED.3IONS-SCNC: 9 MMOL/L (ref 4–13)
AST SERPL W P-5'-P-CCNC: 27 U/L (ref 5–45)
BACTERIA BLD CULT: ABNORMAL
BASOPHILS # BLD MANUAL: 0 THOUSAND/UL (ref 0–0.1)
BASOPHILS NFR MAR MANUAL: 0 % (ref 0–1)
BILIRUB SERPL-MCNC: 0.13 MG/DL (ref 0.2–1)
BUN SERPL-MCNC: 43 MG/DL (ref 5–25)
CALCIUM ALBUM COR SERPL-MCNC: 10.3 MG/DL (ref 8.3–10.1)
CALCIUM SERPL-MCNC: 9.1 MG/DL (ref 8.3–10.1)
CHLORIDE SERPL-SCNC: 103 MMOL/L (ref 100–108)
CO2 SERPL-SCNC: 31 MMOL/L (ref 21–32)
CREAT SERPL-MCNC: 0.88 MG/DL (ref 0.6–1.3)
EOSINOPHIL # BLD MANUAL: 0 THOUSAND/UL (ref 0–0.4)
EOSINOPHIL NFR BLD MANUAL: 0 % (ref 0–6)
ERYTHROCYTE [DISTWIDTH] IN BLOOD BY AUTOMATED COUNT: 13.7 % (ref 11.6–15.1)
GFR SERPL CREATININE-BSD FRML MDRD: 65 ML/MIN/1.73SQ M
GLUCOSE SERPL-MCNC: 83 MG/DL (ref 65–140)
GRAM STN SPEC: ABNORMAL
HCT VFR BLD AUTO: 30.6 % (ref 34.8–46.1)
HGB BLD-MCNC: 9.2 G/DL (ref 11.5–15.4)
LYMPHOCYTES # BLD AUTO: 40.35 THOUSAND/UL (ref 0.6–4.47)
LYMPHOCYTES # BLD AUTO: 84 % (ref 14–44)
MAGNESIUM SERPL-MCNC: 1.9 MG/DL (ref 1.6–2.6)
MCH RBC QN AUTO: 29.6 PG (ref 26.8–34.3)
MCHC RBC AUTO-ENTMCNC: 30.1 G/DL (ref 31.4–37.4)
MCV RBC AUTO: 98 FL (ref 82–98)
MONOCYTES # BLD AUTO: 0.48 THOUSAND/UL (ref 0–1.22)
MONOCYTES NFR BLD: 1 % (ref 4–12)
NEUTROPHILS # BLD MANUAL: 6.73 THOUSAND/UL (ref 1.85–7.62)
NEUTS BAND NFR BLD MANUAL: 2 % (ref 0–8)
NEUTS SEG NFR BLD AUTO: 12 % (ref 43–75)
PLATELET # BLD AUTO: 264 THOUSANDS/UL (ref 149–390)
PLATELET BLD QL SMEAR: ADEQUATE
PMV BLD AUTO: 9.6 FL (ref 8.9–12.7)
POTASSIUM SERPL-SCNC: 3.9 MMOL/L (ref 3.5–5.3)
PROT SERPL-MCNC: 7.3 G/DL (ref 6.4–8.2)
RBC # BLD AUTO: 3.11 MILLION/UL (ref 3.81–5.12)
SODIUM SERPL-SCNC: 143 MMOL/L (ref 136–145)
VARIANT LYMPHS # BLD AUTO: 1 %
WBC # BLD AUTO: 48.04 THOUSAND/UL (ref 4.31–10.16)

## 2021-08-30 PROCEDURE — 94640 AIRWAY INHALATION TREATMENT: CPT

## 2021-08-30 PROCEDURE — 99239 HOSP IP/OBS DSCHRG MGMT >30: CPT | Performed by: STUDENT IN AN ORGANIZED HEALTH CARE EDUCATION/TRAINING PROGRAM

## 2021-08-30 PROCEDURE — 94761 N-INVAS EAR/PLS OXIMETRY MLT: CPT

## 2021-08-30 PROCEDURE — 83735 ASSAY OF MAGNESIUM: CPT | Performed by: STUDENT IN AN ORGANIZED HEALTH CARE EDUCATION/TRAINING PROGRAM

## 2021-08-30 PROCEDURE — 80053 COMPREHEN METABOLIC PANEL: CPT | Performed by: STUDENT IN AN ORGANIZED HEALTH CARE EDUCATION/TRAINING PROGRAM

## 2021-08-30 PROCEDURE — 85027 COMPLETE CBC AUTOMATED: CPT | Performed by: STUDENT IN AN ORGANIZED HEALTH CARE EDUCATION/TRAINING PROGRAM

## 2021-08-30 PROCEDURE — 99232 SBSQ HOSP IP/OBS MODERATE 35: CPT | Performed by: NURSE PRACTITIONER

## 2021-08-30 PROCEDURE — 94760 N-INVAS EAR/PLS OXIMETRY 1: CPT

## 2021-08-30 PROCEDURE — 85007 BL SMEAR W/DIFF WBC COUNT: CPT | Performed by: STUDENT IN AN ORGANIZED HEALTH CARE EDUCATION/TRAINING PROGRAM

## 2021-08-30 RX ORDER — PRAVASTATIN SODIUM 20 MG
20 TABLET ORAL
Qty: 30 TABLET | Refills: 0 | Status: SHIPPED | OUTPATIENT
Start: 2021-08-30 | End: 2021-09-29

## 2021-08-30 RX ORDER — AZITHROMYCIN 250 MG/1
250 TABLET, FILM COATED ORAL EVERY 24 HOURS
Qty: 1 TABLET | Refills: 0 | Status: SHIPPED | OUTPATIENT
Start: 2021-08-31 | End: 2021-09-01

## 2021-08-30 RX ORDER — ASPIRIN 81 MG/1
81 TABLET ORAL DAILY
Qty: 30 TABLET | Refills: 0 | Status: SHIPPED | OUTPATIENT
Start: 2021-08-31 | End: 2021-09-30

## 2021-08-30 RX ORDER — PREDNISONE 20 MG/1
40 TABLET ORAL DAILY
Status: DISCONTINUED | OUTPATIENT
Start: 2021-08-31 | End: 2021-08-30 | Stop reason: HOSPADM

## 2021-08-30 RX ORDER — LANOLIN ALCOHOL/MO/W.PET/CERES
6 CREAM (GRAM) TOPICAL
Status: DISCONTINUED | OUTPATIENT
Start: 2021-08-30 | End: 2021-08-30 | Stop reason: HOSPADM

## 2021-08-30 RX ORDER — PREDNISONE 20 MG/1
TABLET ORAL
Qty: 10 TABLET | Refills: 0 | Status: SHIPPED | OUTPATIENT
Start: 2021-08-31 | End: 2021-09-08

## 2021-08-30 RX ORDER — CARVEDILOL 6.25 MG/1
6.25 TABLET ORAL 2 TIMES DAILY WITH MEALS
Qty: 60 TABLET | Refills: 0 | Status: SHIPPED | OUTPATIENT
Start: 2021-08-30 | End: 2021-09-29

## 2021-08-30 RX ORDER — FUROSEMIDE 20 MG/1
20 TABLET ORAL DAILY
Qty: 30 TABLET | Refills: 0 | Status: SHIPPED | OUTPATIENT
Start: 2021-08-31 | End: 2021-09-30

## 2021-08-30 RX ADMIN — LEVALBUTEROL HYDROCHLORIDE 1.25 MG: 1.25 SOLUTION, CONCENTRATE RESPIRATORY (INHALATION) at 14:23

## 2021-08-30 RX ADMIN — Medication 1 TABLET: at 08:47

## 2021-08-30 RX ADMIN — ASPIRIN 81 MG: 81 TABLET, COATED ORAL at 08:47

## 2021-08-30 RX ADMIN — PRAVASTATIN SODIUM 20 MG: 20 TABLET ORAL at 16:25

## 2021-08-30 RX ADMIN — AZITHROMYCIN MONOHYDRATE 250 MG: 250 TABLET ORAL at 08:47

## 2021-08-30 RX ADMIN — LEVALBUTEROL HYDROCHLORIDE 1.25 MG: 1.25 SOLUTION, CONCENTRATE RESPIRATORY (INHALATION) at 07:18

## 2021-08-30 RX ADMIN — VILAZODONE HYDROCHLORIDE 40 MG: 20 TABLET ORAL at 08:49

## 2021-08-30 RX ADMIN — CARVEDILOL 6.25 MG: 6.25 TABLET, FILM COATED ORAL at 08:47

## 2021-08-30 RX ADMIN — CARVEDILOL 6.25 MG: 6.25 TABLET, FILM COATED ORAL at 16:25

## 2021-08-30 RX ADMIN — METHYLPREDNISOLONE SODIUM SUCCINATE 40 MG: 40 INJECTION, POWDER, FOR SOLUTION INTRAMUSCULAR; INTRAVENOUS at 08:48

## 2021-08-30 RX ADMIN — FUROSEMIDE 20 MG: 20 TABLET ORAL at 08:47

## 2021-08-30 RX ADMIN — IPRATROPIUM BROMIDE 0.5 MG: 0.5 SOLUTION RESPIRATORY (INHALATION) at 07:18

## 2021-08-30 RX ADMIN — IPRATROPIUM BROMIDE 0.5 MG: 0.5 SOLUTION RESPIRATORY (INHALATION) at 14:23

## 2021-08-30 NOTE — RESPIRATORY THERAPY NOTE
Home Oxygen Qualifying Test       Patient name: Yolis David        :    Date of Test:  2021  Diagnosis: copd exacerbation     Home Oxygen Test:    **Medicare Guidelines require item(s) 1-5 on all ambulatory patients or 1 and 2 on non-ambulatory patients  1   Baseline SPO2 on Room Air at rest 92 %  2   SPO2 during exercise on Room Air 87 %  During exercise monitor SpO2  If SPO2 increases >=89% with ambulation do not add supplemental             oxygen  If <= 88% on room air add O2 via NC and titrate patient  Patient must be ambulated with O2 and titrated to > 88% with exertion  3   SPO2 on Oxygen at rest 98 % 2 lpm     4   SPO2 during exercise on Oxygen  92% a liter flow of 1 lpm     5   Exercise performed:          duration 6 (min)          [x]  Supplemental Home Oxygen is indicated  []  Client does not qualify for home oxygen        Respiratory Additional Notes-patient can not walk very far due to spinal stenosis , Patient felt mildly short of breath at the end of walf [facility]

## 2021-08-30 NOTE — ASSESSMENT & PLAN NOTE
Results from last 7 days   Lab Units 08/30/21  0439 08/27/21  0414 08/26/21  2213   HEMOGLOBIN g/dL 9 2* 8 1* 8 2*     · Stable, continue DVT prophylaxis  · Suspecting anemia of chronic disease based on iron studies

## 2021-08-30 NOTE — PLAN OF CARE
Problem: Potential for Falls  Goal: Patient will remain free of falls  Description: INTERVENTIONS:  - Educate patient/family on patient safety including physical limitations  - Instruct patient to call for assistance with activity   - Consult OT/PT to assist with strengthening/mobility   - Keep Call bell within reach  - Keep bed low and locked with side rails adjusted as appropriate  - Keep care items and personal belongings within reach  - Initiate and maintain comfort rounds  - Make Fall Risk Sign visible to staff  - Offer Toileting every  Hours, in advance of need  - Initiate/Maintain alarm  - Obtain necessary fall risk management equipment:   - Apply yellow socks and bracelet for high fall risk patients  - Consider moving patient to room near nurses station  Outcome: Progressing     Problem: Nutrition/Hydration-ADULT  Goal: Nutrient/Hydration intake appropriate for improving, restoring or maintaining nutritional needs  Description: Monitor and assess patient's nutrition/hydration status for malnutrition  Collaborate with interdisciplinary team and initiate plan and interventions as ordered  Monitor patient's weight and dietary intake as ordered or per policy  Utilize nutrition screening tool and intervene as necessary  Determine patient's food preferences and provide high-protein, high-caloric foods as appropriate       INTERVENTIONS:  - Monitor oral intake, urinary output, labs, and treatment plans  - Assess nutrition and hydration status and recommend course of action  - Evaluate amount of meals eaten  - Assist patient with eating if necessary   - Allow adequate time for meals  - Recommend/ encourage appropriate diets, oral nutritional supplements, and vitamin/mineral supplements  - Order, calculate, and assess calorie counts as needed  - Recommend, monitor, and adjust tube feedings and TPN/PPN based on assessed needs  - Assess need for intravenous fluids  - Provide specific nutrition/hydration education as appropriate  - Include patient/family/caregiver in decisions related to nutrition  Outcome: Progressing     Problem: MOBILITY - ADULT  Goal: Maintain or return to baseline ADL function  Description: INTERVENTIONS:  -  Assess patient's ability to carry out ADLs; assess patient's baseline for ADL function and identify physical deficits which impact ability to perform ADLs (bathing, care of mouth/teeth, toileting, grooming, dressing, etc )  - Assess/evaluate cause of self-care deficits   - Assess range of motion  - Assess patient's mobility; develop plan if impaired  - Assess patient's need for assistive devices and provide as appropriate  - Encourage maximum independence but intervene and supervise when necessary  - Involve family in performance of ADLs  - Assess for home care needs following discharge   - Consider OT consult to assist with ADL evaluation and planning for discharge  - Provide patient education as appropriate  Outcome: Progressing  Goal: Maintains/Returns to pre admission functional level  Description: INTERVENTIONS:  - Perform BMAT or MOVE assessment daily    - Set and communicate daily mobility goal to care team and patient/family/caregiver  - Collaborate with rehabilitation services on mobility goals if consulted  - Perform Range of Motion  times a day  - Reposition patient every  hours    - Dangle patient  times a day  - Stand patient  times a day  - Ambulate patient  times a day  - Out of bed to chair  times a day   - Out of bed for meals  times a day  - Out of bed for toileting  - Record patient progress and toleration of activity level   Outcome: Progressing

## 2021-08-30 NOTE — ASSESSMENT & PLAN NOTE
· 2 of 2 blood cultures +, 1 for coag negative staph, 1 for staph hominis   · Unlikely true infection, procalcitonin negative, so inclined to suspect contaminant  · On ceftriaxone and azithromycin for possible community acquired pneumonia, now discontinued   · Patient has a leukocytosis but it is likely reactive in setting of steroids

## 2021-08-30 NOTE — DISCHARGE SUMMARY
[address] [facility]  Discharge- Joelene Spurling 1945, 77 y o  female MRN: [id number]  Unit/Bed#: -01 Encounter: [id number]  Primary Care Provider: Edwin Peres MD   Date and time admitted to hospital: 8/26/2021  9:22 PM    Positive blood culture  Assessment & Plan  · 2 of 2 blood cultures +, 1 for coag negative staph, 1 for staph hominis   · Unlikely true infection, procalcitonin negative, so inclined to suspect contaminant  · On ceftriaxone and azithromycin for possible community acquired pneumonia, now discontinued   · Patient has a leukocytosis but it is likely reactive in setting of steroids     COPD with acute exacerbation ([facility] )  Assessment & Plan  · Multifactorial in setting of COPD exacerbation with possible infection  · Initial procalcitonin negative, 2nd in process  · Continue ceftriaxone and azithromycin for now  · Transitioned IV solumedrol to prednisone, pulmonology recommending steroid taper as an outpatient  · Stable for discharge     * Acute congestive heart failure (HCC)-resolved as of 8/29/2021  Assessment & Plan  Wt Readings from Last 3 Encounters:   08/30/21 44 5 kg (98 lb 1 7 oz)   12/27/19 56 7 kg (125 lb)   05/06/19 66 2 kg (145 lb 15 1 oz)   · BNP elevated at 1728  · CT a revealing pulmonary edema, no clinical signs of volume overload though  · Echocardiogram showing grade 1 diastolic heart failure  · Cardiology was consulted, see recommendations   · Net fluid status +1 liter  · Given dose of IV lasix in ED, now transitioned to PO lasix  · CHF exacerbation resolved   · Stable for discharge     Severe protein-calorie malnutrition ([facility] )  Assessment & Plan  Malnutrition Findings:   Adult Malnutrition type: Chronic illness  Adult Degree of Malnutrition: Other severe protein calorie malnutrition    BMI Findings:  Adult BMI Classifications: Underweight < 18 5     Body mass index is 15 37 kg/m²     · Add ensure  · Secondary to severe COPD    Abnormal computed tomography angiography (CTA)  Assessment & Plan  · CT a negative for aneurysm, dissection, but does reveal mediastinal and hilar adenopathy  · Appreciate pulmonology consult  · Also incidental finding of hepatic lesion and hepatomegaly  · Discussed finding with patient and discussed following up outpatient with PCP for an abdomen MRI    Anemia  Assessment & Plan  Results from last 7 days   Lab Units 08/30/21  0439 08/27/21  0414 08/26/21  2213   HEMOGLOBIN g/dL 9 2* 8 1* 8 2*     · Stable, continue DVT prophylaxis  · Suspecting anemia of chronic disease based on iron studies     SIRS (systemic inflammatory response syndrome) ([facility] )  Assessment & Plan  · Meeting criteria due to tachycardia, tachypnea most likely in setting of pulmonary etiology  · Started on IV antibiotics, Blood cultures are + but suspecting contaminant given negative procalcitonin   · Continue azithromycin for COPD exacerbation, discontinue all other antibiotics     Chest pain-resolved as of 8/28/2021  Assessment & Plan  · ACS ruled out with serial troponins         Medical Problems     Resolved Problems  Date Reviewed: 8/27/2021        Resolved    * (Principal) Acute congestive heart failure ([place] [address] ) 8/29/2021     Resolved by  Itz Medley MD    Chest pain 8/28/2021     Resolved by  Itz Medley MD              Discharging Physician / Practitioner: Yane Acuna MD  PCP: Yunior Guevara MD  Admission Date:   Admission Orders (From admission, onward)     Ordered        08/27/21 0144  Inpatient Admission  Once                   Discharge Date: 08/30/21    Consultations During Hospital Stay:  IP CONSULT TO NUTRITION SERVICES  IP CONSULT TO PULMONOLOGY  IP CONSULT TO CARDIOLOGY  · IP CONSULT TO CASE MANAGEMENT  ·     Procedures Performed:   · imaging    Significant Findings / Test Results:   Principal Problem (Resolved):    Acute congestive heart failure ([place] [address] )  Active Problems:    COPD with acute exacerbation ([place] [address] )    Positive blood culture    Severe protein-calorie malnutrition ([facility] )    SIRS (systemic inflammatory response syndrome) (HCC)    Anemia    Abnormal computed tomography angiography (CTA)  Resolved Problems:    Chest pain  ·   ·     Incidental Findings:   · See above      Test Results Pending at Discharge (will require follow up):   · na     Outpatient Tests Requested:  · Follow up with PCP     Complications:  See above     Reason for Admission: shortness of breath     Hospital Course:   Matthias Leigh is a 77 y o  female patient who originally presented to the hospital on 8/26/2021 due to shortness of breath secondary to COPD exacerbation but did have clinical exam findings concerning for CHF exacerbation  Pulmonology and cardiology were consulted, patient was medically stabilized for discharge on 8/30/21  Condition at Discharge: good    Discharge Day Visit / Exam:   * Please refer to separate progress note for these details *    Discussion with Family: Patient declined call to   Discharge instructions/Information to patient and family:   See after visit summary for information provided to patient and family  Provisions for Follow-Up Care:  See after visit summary for information related to follow-up care and any pertinent home health orders  Disposition:   Home    Planned Readmission: none      Discharge Statement:  I spent 30+ minutes discharging the patient  This time was spent on the day of discharge  I had direct contact with the patient on the day of discharge  Greater than 50% of the total time was spent examining patient, answering all patient questions, arranging and discussing plan of care with patient as well as directly providing post-discharge instructions  Additional time then spent on discharge activities  Discharge Medications:  See after visit summary for reconciled discharge medications provided to patient and/or family        **Please Note: This note may have been constructed using a voice recognition system**

## 2021-08-30 NOTE — CASE MANAGEMENT
Case Management Discharge Planning Note    Patient name Dillan Model  Location [id number] MRN [id number]  : 1945 Date 2021       Current Admission Date: 2021  Current Admission Diagnosis:  COPD with acute exacerbation [facility])   Patient Active Problem List   Diagnosis    Ambulatory dysfunction    Hypokalemia    Essential hypertension    Tobacco abuse    Acute on chronic respiratory failure with hypoxia (HCC)    CAD (coronary artery disease), native coronary artery    Flank pain    Pneumonia    Sepsis ([address] )    Major depressive disorder, recurrent episode, moderate (HCC)    Fatigue    COPD with acute exacerbation (HCC)    SIRS (systemic inflammatory response syndrome) (HCC)    Anemia    Abnormal computed tomography angiography (CTA)    Severe protein-calorie malnutrition (HCC)    Positive blood culture    Previous Admission - Discharge Date:3/30/21   LOS (days): 3  Geometric Mean LOS (GMLOS) (days): 4 00  Days to GMLOS:0 4 Previous Discharge Diagnosis:  There are no discharge diagnoses documented for the most recent discharge       Risk of Unplanned Readmission Score  Predictive Model Details          22 (Low)  Factor Value    Calculated 2021 16:04 30% Number of active Rx orders 47    Risk of Unplanned Readmission Model 10% Number of ED visits in last six months 2     9% ECG/EKG order present in last 6 months     7% Latest BUN high (43 mg/dL)     7% Diagnosis of electrolyte disorder present     6% Imaging order present in last 6 months     6% Latest hemoglobin low (9 2 g/dL)     6% Age 77     4% Charlson Comorbidity Index 4     4% Diagnosis of deficiency anemia present     4% Active anticoagulant Rx order present     4% Active corticosteroid Rx order present     3% Current length of stay 3 597 days         BUNDLE: Bundled Patient Payment: CHF SNF LOS 13-15 (DRG 26)    OBJECTIVE:  Pt is a 77y o  year old , white or  [1], female with Mormon preference of None admitted on 8/26/2021  9:22 PM  Pt is admitted to [id number] at [address] [place] [address] with complaints of COPD with acute exacerbation ([address] )     Current admission status: Inpatient  Preferred Pharmacy:   [facility] [id number] [facility] [address]  Phone: [phone] Fax: [phone]    Primary Care Provider: Vanita Lynn MD    Primary Insurance: MEDICARE  Secondary Insurance: Stanley Oliveira    DISCHARGE DETAILS:    Discharge planning discussed with[de-identified] Pt and her son

## 2021-08-30 NOTE — ASSESSMENT & PLAN NOTE
· Multifactorial in setting of COPD exacerbation with possible infection  · Initial procalcitonin negative, 2nd in process  · Continue ceftriaxone and azithromycin for now  · Transitioned IV solumedrol to prednisone, pulmonology recommending steroid taper as an outpatient  · Stable for discharge

## 2021-08-30 NOTE — ASSESSMENT & PLAN NOTE
Wt Readings from Last 3 Encounters:   08/30/21 44 5 kg (98 lb 1 7 oz)   12/27/19 56 7 kg (125 lb)   05/06/19 66 2 kg (145 lb 15 1 oz)   · BNP elevated at 1728  · CT a revealing pulmonary edema, no clinical signs of volume overload though  · Echocardiogram showing grade 1 diastolic heart failure  · Cardiology was consulted, see recommendations   · Net fluid status +1 liter  · Given dose of IV lasix in ED, now transitioned to PO lasix  · CHF exacerbation resolved   · Stable for discharge

## 2021-08-30 NOTE — PLAN OF CARE
Problem: Potential for Falls  Goal: Patient will remain free of falls  Description: INTERVENTIONS:  - Educate patient/family on patient safety including physical limitations  - Instruct patient to call for assistance with activity   - Consult OT/PT to assist with strengthening/mobility   - Keep Call bell within reach  - Keep bed low and locked with side rails adjusted as appropriate  - Keep care items and personal belongings within reach  - Initiate and maintain comfort rounds  - Make Fall Risk Sign visible to staff  - Offer Toileting every  Hours, in advance of need  - Initiate/Maintain alarm  - Obtain necessary fall risk management equipment:   - Apply yellow socks and bracelet for high fall risk patients  - Consider moving patient to room near nurses station  8/30/2021 1847 by Silvina Kaufman RN  Outcome: Adequate for Discharge  8/30/2021 1122 by Silvina Kaufman RN  Outcome: Progressing     Problem: Nutrition/Hydration-ADULT  Goal: Nutrient/Hydration intake appropriate for improving, restoring or maintaining nutritional needs  Description: Monitor and assess patient's nutrition/hydration status for malnutrition  Collaborate with interdisciplinary team and initiate plan and interventions as ordered  Monitor patient's weight and dietary intake as ordered or per policy  Utilize nutrition screening tool and intervene as necessary  Determine patient's food preferences and provide high-protein, high-caloric foods as appropriate       INTERVENTIONS:  - Monitor oral intake, urinary output, labs, and treatment plans  - Assess nutrition and hydration status and recommend course of action  - Evaluate amount of meals eaten  - Assist patient with eating if necessary   - Allow adequate time for meals  - Recommend/ encourage appropriate diets, oral nutritional supplements, and vitamin/mineral supplements  - Order, calculate, and assess calorie counts as needed  - Recommend, monitor, and adjust tube feedings and TPN/PPN based on assessed needs  - Assess need for intravenous fluids  - Provide specific nutrition/hydration education as appropriate  - Include patient/family/caregiver in decisions related to nutrition  8/30/2021 1847 by Oc Cordova RN  Outcome: Adequate for Discharge  8/30/2021 1122 by Oc Cordova RN  Outcome: Progressing     Problem: MOBILITY - ADULT  Goal: Maintain or return to baseline ADL function  Description: INTERVENTIONS:  -  Assess patient's ability to carry out ADLs; assess patient's baseline for ADL function and identify physical deficits which impact ability to perform ADLs (bathing, care of mouth/teeth, toileting, grooming, dressing, etc )  - Assess/evaluate cause of self-care deficits   - Assess range of motion  - Assess patient's mobility; develop plan if impaired  - Assess patient's need for assistive devices and provide as appropriate  - Encourage maximum independence but intervene and supervise when necessary  - Involve family in performance of ADLs  - Assess for home care needs following discharge   - Consider OT consult to assist with ADL evaluation and planning for discharge  - Provide patient education as appropriate  8/30/2021 1847 by Oc Cordova RN  Outcome: Adequate for Discharge  8/30/2021 1122 by Oc Cordova RN  Outcome: Progressing  Goal: Maintains/Returns to pre admission functional level  Description: INTERVENTIONS:  - Perform BMAT or MOVE assessment daily    - Set and communicate daily mobility goal to care team and patient/family/caregiver  - Collaborate with rehabilitation services on mobility goals if consulted  - Perform Range of Motion  times a day  - Reposition patient every  hours    - Dangle patient  times a day  - Stand patient  times a day  - Ambulate patient  times a day  - Out of bed to chair  times a day   - Out of bed for meals times a day  - Out of bed for toileting  - Record patient progress and toleration of activity level   8/30/2021 1847 by Janina Suarez RN  Outcome: Adequate for Discharge  8/30/2021 1122 by Janina Suarez RN  Outcome: Progressing

## 2021-08-30 NOTE — PROGRESS NOTES
Progress Note - Pulmonary   Amedeo Angles 77 y o  female MRN: [id number]  Unit/Bed#: -01 Encounter: [id number]    Assessment/Plan:    Acute pulmonary insufficiency with chronic hypoxic respiratory failure, multifactorial due to below   Baseline oxygen requirement is reported as 5L NC  Titrate oxygen to maintain POX > or = 89%  OOB as tolerated  Ambulatory POX ordered to delineate oxygen needs for D/C  COPD/bronchiectasis with acute exacerbation   Continue steroid with prednisone 40 mg daily and taper by 10 mg q2days as tolerated  Continue Xopenex/Atrovent TID  Complete 5 day course of azithromycin as ordered  Home regimen is Incruse with albuterol as needed  Acute diastolic CHF   Management per Cardiology  Monitor I/Os and daily weights  Abnormal CT chest with pulmonary edema and possible superimposed infectious/inflammatory infiltrates and adenopathy   Procalcitonin negative x 2  Continue to monitor off antibiotics  Repeat CT Chest 4-6 weeks  Nicotine dependence   Recommend complete cessation  NRT  Leukocytosis   No symptoms to suggest new pulmonary infection  Defer further management to primary team     Outpatient follow-up as per D/C instructions  Would benefit from outpatient pulmonary rehab  D/W primary team and nursing  Chief Complaint:    I feel okay, but I am anxious "    Subjective:    Shree Hernandez is sitting up in bed  She reports her breathing feels okay; however, she reports to me that she is anxious about discharge planning  She reports that her son helps take care of her and she currently lives with him  She also mentions that [facility] helps her with some needs at home  She would like to speak with a /case management because she is anxious about this  She feels her breathing is close to baseline  She denies any sputum production, but does have a baseline dry cough  She denies chest pain      Objective:    Vitals: Blood pressure 156/74, pulse 89, temperature (!) 97 4 °F (36 3 °C), resp  rate 18, height 5' 7" (1 702 m), weight 44 5 kg (98 lb 1 7 oz), SpO2 95 %, not currently breastfeeding  2L NC,Body mass index is 15 37 kg/m²  Intake/Output Summary (Last 24 hours) at 8/30/2021 0827  Last data filed at 8/30/2021 0443  Gross per 24 hour   Intake 1500 ml   Output 330 ml   Net 1170 ml       Invasive Devices     Peripheral Intravenous Line            Peripheral IV 08/28/21 Dorsal (posterior); Left Forearm 1 day                Physical Exam:     Physical Exam  Vitals reviewed  Constitutional:       General: She is not in acute distress  Appearance: She is well-developed  She is cachectic  She is not toxic-appearing or diaphoretic  Interventions: Nasal cannula in place  HENT:      Head: Normocephalic and atraumatic  Eyes:      General: No scleral icterus  Neck:      Trachea: No tracheal deviation  Cardiovascular:      Rate and Rhythm: Normal rate and regular rhythm  Heart sounds: S1 normal and S2 normal  No murmur heard  No friction rub  No gallop  Pulmonary:      Effort: Pulmonary effort is normal  No tachypnea, accessory muscle usage or respiratory distress  Breath sounds: Normal breath sounds  No stridor  No decreased breath sounds, wheezing, rhonchi or rales  Chest:      Chest wall: No tenderness  Abdominal:      General: Bowel sounds are normal  There is no distension  Palpations: Abdomen is soft  Tenderness: There is no abdominal tenderness  There is no guarding or rebound  Musculoskeletal:      Cervical back: Neck supple  Skin:     General: Skin is warm and dry  Findings: No rash  Neurological:      Mental Status: She is alert and oriented to person, place, and time  GCS: GCS eye subscore is 4  GCS verbal subscore is 5  GCS motor subscore is 6  Psychiatric:         Speech: Speech normal          Behavior: Behavior normal  Behavior is cooperative         Labs:   CBC:   Lab Results Component Value Date    WBC 48 04 (HH) 08/30/2021    HGB 9 2 (L) 08/30/2021    HCT 30 6 (L) 08/30/2021    MCV 98 08/30/2021     08/30/2021    MCH 29 6 08/30/2021    MCHC 30 1 (L) 08/30/2021    RDW 13 7 08/30/2021    MPV 9 6 08/30/2021   , CMP:   Lab Results   Component Value Date    SODIUM 143 08/30/2021    K 3 9 08/30/2021     08/30/2021    CO2 31 08/30/2021    BUN 43 (H) 08/30/2021    CREATININE 0 88 08/30/2021    CALCIUM 9 1 08/30/2021    AST 27 08/30/2021    ALT 12 08/30/2021    ALKPHOS 139 (H) 08/30/2021    EGFR 65 08/30/2021     Procalcitonin 0 09, 0 06    Imaging and other studies: None new

## 2021-08-31 LAB
BACTERIA BLD CULT: ABNORMAL
GRAM STN SPEC: ABNORMAL

## 2021-09-01 ENCOUNTER — PATIENT OUTREACH (OUTPATIENT)
Dept: CASE MANAGEMENT | Facility: OTHER | Age: 76
End: 2021-09-01

## 2021-09-02 ENCOUNTER — PATIENT OUTREACH (OUTPATIENT)
Dept: CASE MANAGEMENT | Facility: OTHER | Age: 76
End: 2021-09-02

## 2021-09-03 ENCOUNTER — PATIENT OUTREACH (OUTPATIENT)
Dept: CASE MANAGEMENT | Facility: OTHER | Age: 76
End: 2021-09-03

## 2021-09-07 ENCOUNTER — PATIENT OUTREACH (OUTPATIENT)
Dept: CASE MANAGEMENT | Facility: OTHER | Age: 76
End: 2021-09-07

## 2021-09-07 NOTE — LETTER
Date: 09/07/21    Dear Mariela Mendez,   My name is Jayjay Payton; I am a registered nurse care manager working with [employer]  I have not been able to reach you and would like to set a time that I can talk with you over the phone  My work is to help patients that have complex medical conditions get the care they need  This includes patients who may have been in the hospital or emergency room  Please call me with any questions you may have  I look forward to speaking with you    Sincerely,  Vinicius Yoo RN  [phone]  Outpatient Care Manager

## 2021-11-30 ENCOUNTER — EPISODE CHANGES (OUTPATIENT)
Dept: CASE MANAGEMENT | Facility: OTHER | Age: 76
End: 2021-11-30

## 2022-04-20 NOTE — PROGRESS NOTES
In basket message received of hospital discharge  Chart reviewed  I left a message on patient's voicemail with my contact information  Detail Level: Detailed

## 2022-08-14 ENCOUNTER — APPOINTMENT (EMERGENCY)
Dept: RADIOLOGY | Facility: HOSPITAL | Age: 77
End: 2022-08-14
Payer: MEDICARE

## 2022-08-14 ENCOUNTER — HOSPITAL ENCOUNTER (EMERGENCY)
Facility: HOSPITAL | Age: 77
Discharge: HOME | End: 2022-08-14
Attending: EMERGENCY MEDICINE
Payer: MEDICARE

## 2022-08-14 VITALS
RESPIRATION RATE: 26 BRPM | HEART RATE: 85 BPM | SYSTOLIC BLOOD PRESSURE: 174 MMHG | DIASTOLIC BLOOD PRESSURE: 82 MMHG | OXYGEN SATURATION: 100 % | TEMPERATURE: 98.1 F

## 2022-08-14 DIAGNOSIS — R53.1 WEAKNESS: Primary | ICD-10-CM

## 2022-08-14 DIAGNOSIS — D72.829 LEUKOCYTOSIS: ICD-10-CM

## 2022-08-14 DIAGNOSIS — C85.90 LYMPHOMA (HCC): ICD-10-CM

## 2022-08-14 LAB
ALBUMIN SERPL BCP-MCNC: 3.4 G/DL (ref 3.5–5)
ALP SERPL-CCNC: 105 U/L (ref 46–116)
ALT SERPL W P-5'-P-CCNC: 8 U/L (ref 12–78)
ANION GAP SERPL CALCULATED.3IONS-SCNC: 3 MMOL/L (ref 4–13)
APTT PPP: 29 SECONDS (ref 23–37)
AST SERPL W P-5'-P-CCNC: 15 U/L (ref 5–45)
ATRIAL RATE: 79 BPM
BASE EXCESS BLDV CALC-SCNC: 9 MMOL/L
BASOPHILS # BLD MANUAL: 0 THOUSAND/UL (ref 0–0.1)
BASOPHILS NFR MAR MANUAL: 0 % (ref 0–1)
BILIRUB DIRECT SERPL-MCNC: 0.09 MG/DL (ref 0–0.2)
BILIRUB SERPL-MCNC: 0.26 MG/DL (ref 0.2–1)
BUN SERPL-MCNC: 18 MG/DL (ref 5–25)
CALCIUM SERPL-MCNC: 9.5 MG/DL (ref 8.3–10.1)
CARDIAC TROPONIN I PNL SERPL HS: 8 NG/L
CHLORIDE SERPL-SCNC: 103 MMOL/L (ref 96–108)
CO2 SERPL-SCNC: 39 MMOL/L (ref 21–32)
CREAT SERPL-MCNC: 0.87 MG/DL (ref 0.6–1.3)
EOSINOPHIL # BLD MANUAL: 0 THOUSAND/UL (ref 0–0.4)
EOSINOPHIL NFR BLD MANUAL: 0 % (ref 0–6)
ERYTHROCYTE [DISTWIDTH] IN BLOOD BY AUTOMATED COUNT: 14.6 % (ref 11.6–15.1)
FLUAV RNA RESP QL NAA+PROBE: NEGATIVE
FLUBV RNA RESP QL NAA+PROBE: NEGATIVE
GFR SERPL CREATININE-BSD FRML MDRD: 65 ML/MIN/1.73SQ M
GLUCOSE SERPL-MCNC: 96 MG/DL (ref 65–140)
HCO3 BLDV-SCNC: 38.9 MMOL/L (ref 24–30)
HCT VFR BLD AUTO: 36.1 % (ref 34.8–46.1)
HGB BLD-MCNC: 10.4 G/DL (ref 11.5–15.4)
INR PPP: 1.07 (ref 0.84–1.19)
LACTATE SERPL-SCNC: 0.3 MMOL/L (ref 0.5–2)
LYMPHOCYTES # BLD AUTO: 82.34 THOUSAND/UL (ref 0.6–4.47)
LYMPHOCYTES # BLD AUTO: 91 % (ref 14–44)
MCH RBC QN AUTO: 29.5 PG (ref 26.8–34.3)
MCHC RBC AUTO-ENTMCNC: 28.8 G/DL (ref 31.4–37.4)
MCV RBC AUTO: 103 FL (ref 82–98)
MONOCYTES # BLD AUTO: 0 THOUSAND/UL (ref 0–1.22)
MONOCYTES NFR BLD: 0 % (ref 4–12)
NEUTROPHILS # BLD MANUAL: 8.14 THOUSAND/UL (ref 1.85–7.62)
NEUTS SEG NFR BLD AUTO: 9 % (ref 43–75)
P AXIS: 84 DEGREES
PCO2 BLDV: 79.3 MM HG (ref 42–50)
PH BLDV: 7.31 [PH] (ref 7.3–7.4)
PLATELET # BLD AUTO: 149 THOUSANDS/UL (ref 149–390)
PLATELET BLD QL SMEAR: ADEQUATE
PMV BLD AUTO: 10.7 FL (ref 8.9–12.7)
PO2 BLDV: 31 MM HG (ref 35–45)
POTASSIUM SERPL-SCNC: 3.5 MMOL/L (ref 3.5–5.3)
PR INTERVAL: 148 MS
PROT SERPL-MCNC: 7.2 G/DL (ref 6.4–8.4)
PROTHROMBIN TIME: 13.7 SECONDS (ref 11.6–14.5)
QRS AXIS: 82 DEGREES
QRSD INTERVAL: 98 MS
QT INTERVAL: 406 MS
QTC INTERVAL: 465 MS
RBC # BLD AUTO: 3.52 MILLION/UL (ref 3.81–5.12)
RSV RNA RESP QL NAA+PROBE: NEGATIVE
SARS-COV-2 RNA RESP QL NAA+PROBE: NEGATIVE
SODIUM SERPL-SCNC: 145 MMOL/L (ref 135–147)
T WAVE AXIS: 61 DEGREES
VENTRICULAR RATE: 79 BPM
WBC # BLD AUTO: 90.48 THOUSAND/UL (ref 4.31–10.16)

## 2022-08-14 PROCEDURE — 84295 ASSAY OF SERUM SODIUM: CPT

## 2022-08-14 PROCEDURE — 36415 COLL VENOUS BLD VENIPUNCTURE: CPT | Performed by: EMERGENCY MEDICINE

## 2022-08-14 PROCEDURE — 83605 ASSAY OF LACTIC ACID: CPT | Performed by: EMERGENCY MEDICINE

## 2022-08-14 PROCEDURE — 85730 THROMBOPLASTIN TIME PARTIAL: CPT | Performed by: EMERGENCY MEDICINE

## 2022-08-14 PROCEDURE — 80048 BASIC METABOLIC PNL TOTAL CA: CPT | Performed by: EMERGENCY MEDICINE

## 2022-08-14 PROCEDURE — 93005 ELECTROCARDIOGRAM TRACING: CPT

## 2022-08-14 PROCEDURE — 96361 HYDRATE IV INFUSION ADD-ON: CPT

## 2022-08-14 PROCEDURE — 87040 BLOOD CULTURE FOR BACTERIA: CPT | Performed by: EMERGENCY MEDICINE

## 2022-08-14 PROCEDURE — 82947 ASSAY GLUCOSE BLOOD QUANT: CPT

## 2022-08-14 PROCEDURE — 82803 BLOOD GASES ANY COMBINATION: CPT

## 2022-08-14 PROCEDURE — 0241U HB NFCT DS VIR RESP RNA 4 TRGT: CPT | Performed by: EMERGENCY MEDICINE

## 2022-08-14 PROCEDURE — 85007 BL SMEAR W/DIFF WBC COUNT: CPT | Performed by: EMERGENCY MEDICINE

## 2022-08-14 PROCEDURE — 85014 HEMATOCRIT: CPT

## 2022-08-14 PROCEDURE — 99285 EMERGENCY DEPT VISIT HI MDM: CPT | Performed by: EMERGENCY MEDICINE

## 2022-08-14 PROCEDURE — 84132 ASSAY OF SERUM POTASSIUM: CPT

## 2022-08-14 PROCEDURE — 93010 ELECTROCARDIOGRAM REPORT: CPT | Performed by: INTERNAL MEDICINE

## 2022-08-14 PROCEDURE — 71046 X-RAY EXAM CHEST 2 VIEWS: CPT

## 2022-08-14 PROCEDURE — 84484 ASSAY OF TROPONIN QUANT: CPT | Performed by: EMERGENCY MEDICINE

## 2022-08-14 PROCEDURE — 85027 COMPLETE CBC AUTOMATED: CPT | Performed by: EMERGENCY MEDICINE

## 2022-08-14 PROCEDURE — 99285 EMERGENCY DEPT VISIT HI MDM: CPT

## 2022-08-14 PROCEDURE — 96374 THER/PROPH/DIAG INJ IV PUSH: CPT

## 2022-08-14 PROCEDURE — 82805 BLOOD GASES W/O2 SATURATION: CPT | Performed by: EMERGENCY MEDICINE

## 2022-08-14 PROCEDURE — 94640 AIRWAY INHALATION TREATMENT: CPT

## 2022-08-14 PROCEDURE — 80076 HEPATIC FUNCTION PANEL: CPT | Performed by: EMERGENCY MEDICINE

## 2022-08-14 PROCEDURE — 82330 ASSAY OF CALCIUM: CPT

## 2022-08-14 PROCEDURE — 85610 PROTHROMBIN TIME: CPT | Performed by: EMERGENCY MEDICINE

## 2022-08-14 RX ORDER — METHYLPREDNISOLONE SODIUM SUCCINATE 125 MG/2ML
60 INJECTION, POWDER, LYOPHILIZED, FOR SOLUTION INTRAMUSCULAR; INTRAVENOUS ONCE
Status: COMPLETED | OUTPATIENT
Start: 2022-08-14 | End: 2022-08-14

## 2022-08-14 RX ORDER — IPRATROPIUM BROMIDE AND ALBUTEROL SULFATE 2.5; .5 MG/3ML; MG/3ML
3 SOLUTION RESPIRATORY (INHALATION) ONCE
Status: COMPLETED | OUTPATIENT
Start: 2022-08-14 | End: 2022-08-14

## 2022-08-14 RX ADMIN — SODIUM CHLORIDE 1000 ML: 0.9 INJECTION, SOLUTION INTRAVENOUS at 10:08

## 2022-08-14 RX ADMIN — IPRATROPIUM BROMIDE AND ALBUTEROL SULFATE 3 ML: 2.5; .5 SOLUTION RESPIRATORY (INHALATION) at 10:08

## 2022-08-14 RX ADMIN — METHYLPREDNISOLONE SODIUM SUCCINATE 60 MG: 125 INJECTION, POWDER, FOR SOLUTION INTRAMUSCULAR; INTRAVENOUS at 10:08

## 2022-08-15 LAB
BASE EXCESS BLDA CALC-SCNC: 11 MMOL/L (ref -2–3)
CA-I BLD-SCNC: 1.3 MMOL/L (ref 1.12–1.32)
GLUCOSE SERPL-MCNC: 92 MG/DL (ref 65–140)
HCO3 BLDA-SCNC: 40.6 MMOL/L (ref 24–30)
HCT VFR BLD CALC: 34 % (ref 34.8–46.1)
HGB BLDA-MCNC: 11.6 G/DL (ref 11.5–15.4)
PCO2 BLD: 43 MMOL/L (ref 21–32)
PCO2 BLD: 82.2 MM HG (ref 42–50)
PH BLD: 7.3 [PH] (ref 7.3–7.4)
PO2 BLD: 34 MM HG (ref 35–45)
POTASSIUM BLD-SCNC: 3.3 MMOL/L (ref 3.5–5.3)
SAO2 % BLD FROM PO2: 55 % (ref 60–85)
SODIUM BLD-SCNC: 143 MMOL/L (ref 136–145)
SPECIMEN SOURCE: ABNORMAL

## 2022-08-20 LAB
BACTERIA BLD CULT: NORMAL
BACTERIA BLD CULT: NORMAL

## 2023-02-20 ENCOUNTER — APPOINTMENT (EMERGENCY)
Dept: VASCULAR ULTRASOUND | Facility: HOSPITAL | Age: 78
End: 2023-02-20

## 2023-02-20 ENCOUNTER — HOSPITAL ENCOUNTER (EMERGENCY)
Facility: HOSPITAL | Age: 78
Discharge: HOME | End: 2023-02-20
Attending: EMERGENCY MEDICINE

## 2023-02-20 VITALS
DIASTOLIC BLOOD PRESSURE: 67 MMHG | SYSTOLIC BLOOD PRESSURE: 144 MMHG | HEART RATE: 97 BPM | OXYGEN SATURATION: 99 % | TEMPERATURE: 97.4 F | BODY MASS INDEX: 15.37 KG/M2 | WEIGHT: 98.11 LBS | RESPIRATION RATE: 19 BRPM

## 2023-02-20 DIAGNOSIS — T14.8XXA BRUISING: Primary | ICD-10-CM

## 2023-02-20 LAB
ANION GAP SERPL CALCULATED.3IONS-SCNC: 4 MMOL/L (ref 4–13)
BASOPHILS # BLD MANUAL: 0.1 THOUSAND/UL (ref 0–0.1)
BASOPHILS NFR MAR MANUAL: 1 % (ref 0–1)
BUN SERPL-MCNC: 13 MG/DL (ref 5–25)
CALCIUM SERPL-MCNC: 9.1 MG/DL (ref 8.4–10.2)
CARDIAC TROPONIN I PNL SERPL HS: 4 NG/L
CHLORIDE SERPL-SCNC: 100 MMOL/L (ref 96–108)
CO2 SERPL-SCNC: 38 MMOL/L (ref 21–32)
CREAT SERPL-MCNC: 0.65 MG/DL (ref 0.6–1.3)
EOSINOPHIL # BLD MANUAL: 0.1 THOUSAND/UL (ref 0–0.4)
EOSINOPHIL NFR BLD MANUAL: 1 % (ref 0–6)
ERYTHROCYTE [DISTWIDTH] IN BLOOD BY AUTOMATED COUNT: 13.6 % (ref 11.6–15.1)
GFR SERPL CREATININE-BSD FRML MDRD: 86 ML/MIN/1.73SQ M
GLUCOSE SERPL-MCNC: 84 MG/DL (ref 65–140)
HCT VFR BLD AUTO: 25.8 % (ref 34.8–46.1)
HGB BLD-MCNC: 8 G/DL (ref 11.5–15.4)
HYPERCHROMIA BLD QL SMEAR: PRESENT
LYMPHOCYTES # BLD AUTO: 4.23 THOUSAND/UL (ref 0.6–4.47)
LYMPHOCYTES # BLD AUTO: 41 % (ref 14–44)
MCH RBC QN AUTO: 31.6 PG (ref 26.8–34.3)
MCHC RBC AUTO-ENTMCNC: 31 G/DL (ref 31.4–37.4)
MCV RBC AUTO: 102 FL (ref 82–98)
METAMYELOCYTES NFR BLD MANUAL: 1 % (ref 0–1)
MONOCYTES # BLD AUTO: 0.93 THOUSAND/UL (ref 0–1.22)
MONOCYTES NFR BLD: 9 % (ref 4–12)
MYELOCYTES NFR BLD MANUAL: 2 % (ref 0–1)
NEUTROPHILS # BLD MANUAL: 4.64 THOUSAND/UL (ref 1.85–7.62)
NEUTS SEG NFR BLD AUTO: 45 % (ref 43–75)
PLATELET # BLD AUTO: 156 THOUSANDS/UL (ref 149–390)
PLATELET BLD QL SMEAR: ADEQUATE
PMV BLD AUTO: 9.7 FL (ref 8.9–12.7)
POLYCHROMASIA BLD QL SMEAR: PRESENT
POTASSIUM SERPL-SCNC: 3.6 MMOL/L (ref 3.5–5.3)
RBC # BLD AUTO: 2.53 MILLION/UL (ref 3.81–5.12)
RBC MORPH BLD: PRESENT
SODIUM SERPL-SCNC: 142 MMOL/L (ref 135–147)
WBC # BLD AUTO: 10.31 THOUSAND/UL (ref 4.31–10.16)

## 2023-02-20 NOTE — ED PROVIDER NOTES
History  Chief Complaint   Patient presents with   • Bleeding/Bruising     BP being taken hourly at home, woke up w/ bruising to L arm  Visiting nurse concerned for cellulitis, states reddened and warm to touch  Pt denies any fevers  81-year-old female presents to the ER for bruising of her left arm  Patient stated that 2 days ago the son took her blood pressure and it was elevated in the 170s  Son called home health nurse that office recommended her to take her blood pressure hourly and trend them  Son did as recommended and took the patient's blood pressure every hour on the left arm  Patient blood pressure did decrease with her daily medication after taking it  Yesterday the son was helping the patient out of the shower and noticed large bruising on the left arm  The bruising to her left arm is circumferential   Pain with palpation  When home health nurse arrived to the home today the nurse was concerned that the patient had cellulitis  Son brought her to the ER immediately for further evaluation  Patient takes anticoagulants and antiplatelets  History of cancer  Patient skin is dry and frail  Patient notes that she gets bruises very easily and this is not very uncommon for her  Patient denies fever, chills, nausea, vomiting  History provided by:  Patient      Prior to Admission Medications   Prescriptions Last Dose Informant Patient Reported? Taking? Calcium-Magnesium-Vitamin D (CALCIUM 500 PO)   Yes No   Sig: Take 1,000 mg by mouth daily   HYDROcodone-acetaminophen (NORCO) 5-325 mg per tablet   Yes No   Sig: Take 1 tablet by mouth every 6 (six) hours as needed for pain   albuterol (VENTOLIN HFA) 90 mcg/act inhaler   No No   Sig: Inhale 2 puffs every 6 (six) hours as needed for wheezing   alendronate (FOSAMAX) 35 mg tablet   Yes No   Sig: Take 35 mg by mouth every 7 days Patient takes this med every Sunday     Patient not taking: Reported on 8/27/2021   amLODIPine (NORVASC) 5 mg tablet No No   Sig: Take 1 tablet (5 mg total) by mouth daily   Patient not taking: Reported on 3/30/2021   ascorbic acid (VITAMIN C) 500 mg tablet   Yes No   Sig: Take 500 mg by mouth daily   Patient not taking: Reported on 8/27/2021   aspirin (ECOTRIN LOW STRENGTH) 81 mg EC tablet   No No   Sig: Take 1 tablet (81 mg total) by mouth daily   budesonide-formoterol (SYMBICORT) 160-4 5 mcg/act inhaler   No No   Sig: Inhale 2 puffs 2 (two) times a day Rinse mouth after use     Patient not taking: Reported on 3/30/2021   carvedilol (COREG) 6 25 mg tablet   No No   Sig: Take 1 tablet (6 25 mg total) by mouth 2 (two) times a day with meals   cholecalciferol (VITAMIN D3) 1,000 units tablet   No No   Sig: Take 1 tablet (1,000 Units total) by mouth daily   Patient not taking: Reported on 3/30/2021   enalapril (VASOTEC) 5 mg tablet   No No   Sig: Take 1 tablet (5 mg total) by mouth 2 (two) times a day   escitalopram (LEXAPRO) 10 mg tablet   No No   Sig: Take 1 tablet (10 mg total) by mouth daily   Patient taking differently: Take 20 mg by mouth daily    ferrous sulfate 325 (65 Fe) mg tablet   Yes No   Sig: Take 325 mg by mouth daily with breakfast   Patient not taking: Reported on 8/27/2021   furosemide (LASIX) 20 mg tablet   No No   Sig: Take 1 tablet (20 mg total) by mouth daily   guaiFENesin (MUCINEX) 600 mg 12 hr tablet   No No   Sig: Take 1 tablet (600 mg total) by mouth 2 (two) times a day   Patient not taking: Reported on 3/30/2021   hydrochlorothiazide (HYDRODIURIL) 25 mg tablet   No No   Sig: Take 1 tablet (25 mg total) by mouth daily   Patient not taking: Reported on 3/30/2021   levalbuterol (XOPENEX) 1 25 mg/0 5 mL nebulizer solution   No No   Sig: Take 0 5 mL (1 25 mg total) by nebulization 3 (three) times a day   Patient not taking: Reported on 8/27/2021   melatonin 3 mg   No No   Sig: Take 1 tablet (3 mg total) by mouth daily at bedtime as needed (insomnia)   Patient not taking: Reported on 3/30/2021   nitroglycerin (NITROSTAT) 0 4 mg SL tablet   Yes No   Sig: Place 0 4 mg under the tongue every 5 (five) minutes as needed for chest pain   prasugrel (EFFIENT) tablet   Yes No   Sig: Take 10 mg by mouth   Patient not taking: Reported on 8/27/2021   pravastatin (PRAVACHOL) 20 mg tablet   No No   Sig: Take 1 tablet (20 mg total) by mouth daily with dinner   predniSONE 20 mg tablet   No No   Sig: Take 1 tablet (20 mg total) by mouth daily   Patient not taking: Reported on 3/30/2021   sodium chloride 0 9 % nebulizer solution   No No   Sig: Take 3 mL by nebulization 3 (three) times a day   vilazodone (Viibryd) 40 mg tablet   Yes No   Sig: Take 40 mg by mouth daily with breakfast      Facility-Administered Medications: None       Past Medical History:   Diagnosis Date   • Acute congestive heart failure (HCC) 8/27/2021   • Anxiety    • Cardiac disease    • Chest pain 8/27/2021   • Chronic pain disorder    • COPD (chronic obstructive pulmonary disease) ([facility])    • Depression    • Heart disease    • Hyperlipidemia    • Hypertension    • MI (myocardial infarction) ([facility] [address] )    • MRSA (methicillin resistant Staphylococcus aureus)    • Renal disorder     benign kidney tumor       Past Surgical History:   Procedure Laterality Date   • APPENDECTOMY     • ELBOW BURSA SURGERY Left 02/2018    D/T MRSA INFECTION   • SPINAL FUSION      L7 L9       Family History   Problem Relation Age of Onset   • Heart disease Mother    • Cancer Father      I have reviewed and agree with the history as documented      E-Cigarette/Vaping     E-Cigarette/Vaping Substances     Social History     Tobacco Use   • Smoking status: Every Day     Packs/day: 1 00     Years: 60 00     Pack years: 60 00     Types: Cigarettes   • Smokeless tobacco: Never   • Tobacco comments:     She has close to a 60 pack-year smoking history, most recently has cut down to 4-5 cigarettes daily   Substance Use Topics   • Alcohol use: Never   • Drug use: No       Review of Systems   Constitutional: Negative for fever  Cardiovascular: Negative for chest pain and palpitations  Gastrointestinal: Negative for abdominal pain  Neurological: Negative for dizziness, syncope, weakness, numbness and headaches  Hematological: Bruises/bleeds easily  Physical Exam  Physical Exam  HENT:      Head: Normocephalic and atraumatic  Cardiovascular:      Rate and Rhythm: Normal rate and regular rhythm  Pulses: Normal pulses  Heart sounds: Normal heart sounds  Pulmonary:      Effort: Pulmonary effort is normal       Breath sounds: Normal breath sounds  Comments: Chronic O2   Musculoskeletal:         General: No swelling or signs of injury  Normal range of motion  Skin:     Findings: Bruising present  Neurological:      Mental Status: She is alert  Mental status is at baseline     Psychiatric:         Mood and Affect: Mood normal          Vital Signs  ED Triage Vitals   Temperature Pulse Respirations Blood Pressure SpO2   02/20/23 1219 02/20/23 1219 02/20/23 1219 02/20/23 1219 02/20/23 1219   (!) 97 4 °F (36 3 °C) 88 18 (!) 185/83 97 %      Temp src Heart Rate Source Patient Position - Orthostatic VS BP Location FiO2 (%)   -- 02/20/23 1219 02/20/23 1602 02/20/23 1602 --    Monitor Sitting Right arm       Pain Score       --                  Vitals:    02/20/23 1219 02/20/23 1602   BP: (!) 185/83 144/67   Pulse: 88 97   Patient Position - Orthostatic VS:  Sitting         Visual Acuity      ED Medications  Medications - No data to display    Diagnostic Studies  Results Reviewed     Procedure Component Value Units Date/Time    CBC and differential [[id number]]  (Abnormal) Collected: 02/20/23 1433    Lab Status: Final result Specimen: Blood from Arm, Left Updated: 02/20/23 1517     WBC 10 31 Thousand/uL      RBC 2 53 Million/uL      Hemoglobin 8 0 g/dL      Hematocrit 25 8 %       fL      MCH 31 6 pg      MCHC 31 0 g/dL      RDW 13 6 %      MPV 9 7 fL      Platelets 048 Thousands/uL Narrative: This is an appended report  These results have been appended to a previously verified report      Manual Differential(PHLEBS Do Not Order) [[id number]]  (Abnormal) Collected: 02/20/23 1433    Lab Status: Final result Specimen: Blood from Arm, Left Updated: 02/20/23 1517     Segmented % 45 %      Lymphocytes % 41 %      Monocytes % 9 %      Eosinophils, % 1 %      Basophils % 1 %      Metamyelocytes% 1 %      Myelocytes % 2 %      Absolute Neutrophils 4 64 Thousand/uL      Lymphocytes Absolute 4 23 Thousand/uL      Monocytes Absolute 0 93 Thousand/uL      Eosinophils Absolute 0 10 Thousand/uL      Basophils Absolute 0 10 Thousand/uL      Total Counted --     RBC Morphology Present     Hypochromia Present     Polychromasia Present     Platelet Estimate Adequate    HS Troponin 0hr (reflex protocol) [[id number]]  (Normal) Collected: 02/20/23 1433    Lab Status: Final result Specimen: Blood from Arm, Left Updated: 02/20/23 1505     hs TnI 0hr 4 ng/L     Basic metabolic panel [[id number]]  (Abnormal) Collected: 02/20/23 1433    Lab Status: Final result Specimen: Blood from Arm, Left Updated: 02/20/23 1453     Sodium 142 mmol/L      Potassium 3 6 mmol/L      Chloride 100 mmol/L      CO2 38 mmol/L      ANION GAP 4 mmol/L      BUN 13 mg/dL      Creatinine 0 65 mg/dL      Glucose 84 mg/dL      Calcium 9 1 mg/dL      eGFR 86 ml/min/1 73sq m     Narrative:      [facility] guidelines for Chronic Kidney Disease (CKD):   •  Stage 1 with normal or high GFR (GFR > 90 mL/min/1 73 square meters)  •  Stage 2 Mild CKD (GFR = 60-89 mL/min/1 73 square meters)  •  Stage 3A Moderate CKD (GFR = 45-59 mL/min/1 73 square meters)  •  Stage 3B Moderate CKD (GFR = 30-44 mL/min/1 73 square meters)  •  Stage 4 Severe CKD (GFR = 15-29 mL/min/1 73 square meters)  •  Stage 5 End Stage CKD (GFR <15 mL/min/1 73 square meters)  Note: GFR calculation is accurate only with a steady state creatinine                 VAS upper limb venous duplex scan, unilateral/limited    (Results Pending)              Procedures  ECG 12 Lead Documentation Only    Date/Time: 2/20/2023 2:33 PM  Performed by: TAMMIE Valente  Authorized by: TAMMIE Valente     Indications / Diagnosis:  Abnormal bruising, HTN  Patient location:  ED  Previous ECG:     Previous ECG:  Compared to current    Comparison ECG info:  8/14/22    Similarity:  No change  Interpretation:     Interpretation: normal    Rate:     ECG rate:  88    ECG rate assessment: normal    Rhythm:     Rhythm: sinus rhythm    Ectopy:     Ectopy: none    QRS:     QRS axis:  Normal  Conduction:     Conduction: normal    ST segments:     ST segments:  Normal  T waves:     T waves: normal               ED Course                               SBIRT 20yo+    Flowsheet Row Most Recent Value   SBIRT (25 yo +)    In order to provide better care to our patients, we are screening all of our patients for alcohol and drug use  Would it be okay to ask you these screening questions? Yes Filed at: 02/20/2023 1609   Initial Alcohol Screen: US AUDIT-C     1  How often do you have a drink containing alcohol? 0 Filed at: 02/20/2023 1609   2  How many drinks containing alcohol do you have on a typical day you are drinking? 0 Filed at: 02/20/2023 1609   3b  FEMALE Any Age, or MALE 65+: How often do you have 4 or more drinks on one occassion? 0 Filed at: 02/20/2023 1609   Audit-C Score 0 Filed at: 02/20/2023 1609   NIA: How many times in the past year have you    Used an illegal drug or used a prescription medication for non-medical reasons? Never Filed at: 02/20/2023 1609                    Medical Decision Making  68 y/o female presents to the ER for evaluation of bruising to her left arm  Patient stated that her son was taking her blood pressure every hour on Saturday because she had a high reading and that is what the home health nurse told them to do   She noticed a large bruise on her left arm where the blood pressure was last night  The day time home health nurse was concerned with cellulitis and told the son to bring the patient into the ER  Patient's presentation most consistent with a contusion secondary to repeative blood pressure readings  Patient had a vascular duplex and was negative for DVT or superficial thrombophlebitis  Patient had and EKG and trop which showed nonischemic events  Patient hemoglobin dropped 2 grams since the last CBC which further makes contusion more consistent than cellulitis  Patient's bruise on the left arm was outlined; advised son to watch contusion and if there is spreading upward with severe tenderness to the arm come back to the ER for evaluation  Follow up with PCP and cardiologist for HTN  Patient and son agreed with plan of care  Bruising: acute illness or injury  Amount and/or Complexity of Data Reviewed  Labs: ordered  ECG/medicine tests: ordered  Disposition  Final diagnoses:   Bruising     Time reflects when diagnosis was documented in both MDM as applicable and the Disposition within this note     Time User Action Codes Description Comment    2/20/2023  3:51 PM Julien Heath  [address] [facility] Bruising       ED Disposition     ED Disposition   Discharge    Condition   Stable    Date/Time   Mon Feb 20, 2023  3:53 PM    Tyson Aguilar discharge to home/self care  Follow-up Information     Follow up With Specialties Details Why Contact Info Additional Information    Mario Varela MD Internal Medicine   [address] [place] [id number]  [address] [facility] Emergency Department Emergency Medicine   [address] [facility] [id number] [facility] Emergency Department, [address] [place], [place], [address]          Patient's Medications   Discharge Prescriptions    No medications on file       No discharge procedures on file      PDMP Review       Value Time User    PDMP Reviewed  Yes 8/27/2021  3:13 AM Verónica Basilio PA-C          ED Provider  Electronically Signed by           Dayan Mathew  02/20/23 8572

## 2023-02-20 NOTE — ED ATTENDING ATTESTATION
2/20/2023  I, Chelsea Thereasa Nageotte, DO, saw and evaluated the patient  I have discussed the patient with the resident/non-physician practitioner and agree with the resident's/non-physician practitioner's findings, Plan of Care, and MDM as documented in the resident's/non-physician practitioner's note, except where noted  All available labs and Radiology studies were reviewed  I was present for key portions of any procedure(s) performed by the resident/non-physician practitioner and I was immediately available to provide assistance  At this point I agree with the current assessment done in the Emergency Department  I have conducted an independent evaluation of this patient a history and physical is as follows:    ED Course    Patient is 73-year-old female past medical history of hypertension,, COPD, CAD, CHF, CKD presenting with bruising to her arm  Patient states that 2 days ago she had elevated blood pressure at home, son states accompanied by chest pain shortness of breath, and was unsure whether or not she was compliant with her medication at the time  Called home nursing number who told him to give her a dose of her medication and then to take the patient's blood pressure every hour for the rest of the night to see if it went down  He states that the next day after his mother was getting out of the shower he noticed bruising to her left arm which she assumed was from the blood pressure cuff however when home nurse saw her today stated it was warm to the touch and was concerned for cellulitis and sent to the emergency department for evaluation  Patient denies any pain to the area, fevers, purulent drainage and states she is no longer having any chest pain, shortness of breath, dizziness  Does not take any blood thinners  Denies any falls  Patient is well-appearing at bedside with stable vitals and in no acute distress    She has diffuse ecchymosis to the left upper arm which is not circumferential, nontender, not with increased warmth as compared to the other arm and tracking in a distribution expected for ecchymosis and hematoma  Have low suspicion for cellulitis however will obtain labs both to assess for leukocytosis as well as cardiac work-up in the setting of possible hypertensive emergency 2 days ago, obtain DVT ultrasound and continue to monitor  ED Course as of 02/22/23 2003 Mon Feb 20, 2023   1553 Patient has 2 g drop in hemoglobin from recent labs, likely due to hematoma to the left arm, DVT ultrasound unremarkable, no elevated white count, no tenderness on exam   Very low concern for cellulitis, patient hemodynamically stable, have discussed return precautions relative to infection as well as return precautions of chest pain, shortness of breath, nausea or vomiting or dizziness with son and PCP follow-up for uncontrolled hypertension and son states that he understands           Critical Care Time  Procedures

## 2023-02-20 NOTE — DISCHARGE INSTRUCTIONS
Return to the ER if symptoms worsen: red streaking, fever, tenderness, swelling, hot to touch, drainage  Chest pain/hypertension return precautions: Shortness of breath (more than baseline), chest pain, headache, dizziness  Follow up with PCP and/ or Cardiologist

## 2023-02-21 LAB
ATRIAL RATE: 88 BPM
P AXIS: 67 DEGREES
PR INTERVAL: 142 MS
QRS AXIS: 79 DEGREES
QRSD INTERVAL: 88 MS
QT INTERVAL: 382 MS
QTC INTERVAL: 462 MS
T WAVE AXIS: 53 DEGREES
VENTRICULAR RATE: 88 BPM

## 2023-03-06 ENCOUNTER — APPOINTMENT (EMERGENCY)
Dept: RADIOLOGY | Facility: HOSPITAL | Age: 78
End: 2023-03-06

## 2023-03-06 ENCOUNTER — HOSPITAL ENCOUNTER (OUTPATIENT)
Facility: HOSPITAL | Age: 78
Setting detail: OBSERVATION
Discharge: HOME HEALTH | End: 2023-03-07
Attending: EMERGENCY MEDICINE | Admitting: INTERNAL MEDICINE

## 2023-03-06 DIAGNOSIS — R26.2 AMBULATORY DYSFUNCTION: ICD-10-CM

## 2023-03-06 DIAGNOSIS — R53.1 WEAKNESS: ICD-10-CM

## 2023-03-06 DIAGNOSIS — D64.9 ANEMIA, UNSPECIFIED TYPE: ICD-10-CM

## 2023-03-06 DIAGNOSIS — J44.1 COPD EXACERBATION (HCC): Primary | ICD-10-CM

## 2023-03-06 PROBLEM — J96.11 CHRONIC RESPIRATORY FAILURE WITH HYPOXIA (HCC): Status: ACTIVE | Noted: 2018-12-19

## 2023-03-06 PROBLEM — C88.0 WALDENSTROM MACROGLOBULINEMIA (HCC): Status: ACTIVE | Noted: 2023-03-06

## 2023-03-06 LAB
2HR DELTA HS TROPONIN: 0 NG/L
ALBUMIN SERPL BCP-MCNC: 3.8 G/DL (ref 3.5–5)
ALP SERPL-CCNC: 63 U/L (ref 34–104)
ALT SERPL W P-5'-P-CCNC: 3 U/L (ref 7–52)
ANION GAP SERPL CALCULATED.3IONS-SCNC: 3 MMOL/L (ref 4–13)
AST SERPL W P-5'-P-CCNC: 9 U/L (ref 13–39)
ATRIAL RATE: 84 BPM
BASOPHILS # BLD AUTO: 0.03 THOUSANDS/ÂΜL (ref 0–0.1)
BASOPHILS NFR BLD AUTO: 0 % (ref 0–1)
BILIRUB SERPL-MCNC: 0.59 MG/DL (ref 0.2–1)
BNP SERPL-MCNC: 146 PG/ML (ref 0–100)
BUN SERPL-MCNC: 10 MG/DL (ref 5–25)
CALCIUM SERPL-MCNC: 9.3 MG/DL (ref 8.4–10.2)
CARDIAC TROPONIN I PNL SERPL HS: 10 NG/L
CARDIAC TROPONIN I PNL SERPL HS: 10 NG/L
CHLORIDE SERPL-SCNC: 99 MMOL/L (ref 96–108)
CO2 SERPL-SCNC: 38 MMOL/L (ref 21–32)
CREAT SERPL-MCNC: 0.69 MG/DL (ref 0.6–1.3)
EOSINOPHIL # BLD AUTO: 0.12 THOUSAND/ÂΜL (ref 0–0.61)
EOSINOPHIL NFR BLD AUTO: 1 % (ref 0–6)
ERYTHROCYTE [DISTWIDTH] IN BLOOD BY AUTOMATED COUNT: 13.3 % (ref 11.6–15.1)
FLUAV RNA RESP QL NAA+PROBE: NEGATIVE
FLUBV RNA RESP QL NAA+PROBE: NEGATIVE
GFR SERPL CREATININE-BSD FRML MDRD: 84 ML/MIN/1.73SQ M
GLUCOSE SERPL-MCNC: 112 MG/DL (ref 65–140)
HCT VFR BLD AUTO: 26.6 % (ref 34.8–46.1)
HGB BLD-MCNC: 8.1 G/DL (ref 11.5–15.4)
IMM GRANULOCYTES # BLD AUTO: 0.02 THOUSAND/UL (ref 0–0.2)
IMM GRANULOCYTES NFR BLD AUTO: 0 % (ref 0–2)
LACTATE SERPL-SCNC: 0.5 MMOL/L (ref 0.5–2)
LYMPHOCYTES # BLD AUTO: 4.64 THOUSANDS/ÂΜL (ref 0.6–4.47)
LYMPHOCYTES NFR BLD AUTO: 47 % (ref 14–44)
MCH RBC QN AUTO: 30.9 PG (ref 26.8–34.3)
MCHC RBC AUTO-ENTMCNC: 30.5 G/DL (ref 31.4–37.4)
MCV RBC AUTO: 102 FL (ref 82–98)
MONOCYTES # BLD AUTO: 0.94 THOUSAND/ÂΜL (ref 0.17–1.22)
MONOCYTES NFR BLD AUTO: 9 % (ref 4–12)
NEUTROPHILS # BLD AUTO: 4.4 THOUSANDS/ÂΜL (ref 1.85–7.62)
NEUTS SEG NFR BLD AUTO: 43 % (ref 43–75)
NRBC BLD AUTO-RTO: 0 /100 WBCS
P AXIS: 78 DEGREES
PLATELET # BLD AUTO: 184 THOUSANDS/UL (ref 149–390)
PMV BLD AUTO: 10.2 FL (ref 8.9–12.7)
POTASSIUM SERPL-SCNC: 3.9 MMOL/L (ref 3.5–5.3)
PR INTERVAL: 150 MS
PROCALCITONIN SERPL-MCNC: <0.05 NG/ML
PROT SERPL-MCNC: 6.9 G/DL (ref 6.4–8.4)
QRS AXIS: 83 DEGREES
QRSD INTERVAL: 88 MS
QT INTERVAL: 388 MS
QTC INTERVAL: 458 MS
RBC # BLD AUTO: 2.62 MILLION/UL (ref 3.81–5.12)
RSV RNA RESP QL NAA+PROBE: NEGATIVE
SARS-COV-2 RNA RESP QL NAA+PROBE: NEGATIVE
SODIUM SERPL-SCNC: 140 MMOL/L (ref 135–147)
T WAVE AXIS: 80 DEGREES
VENTRICULAR RATE: 84 BPM
WBC # BLD AUTO: 10.15 THOUSAND/UL (ref 4.31–10.16)

## 2023-03-06 RX ORDER — BUPROPION HYDROCHLORIDE 75 MG/1
75 TABLET ORAL
Status: DISCONTINUED | OUTPATIENT
Start: 2023-03-06 | End: 2023-03-07 | Stop reason: HOSPADM

## 2023-03-06 RX ORDER — ACETAMINOPHEN 325 MG/1
650 TABLET ORAL EVERY 6 HOURS PRN
Status: DISCONTINUED | OUTPATIENT
Start: 2023-03-06 | End: 2023-03-07 | Stop reason: HOSPADM

## 2023-03-06 RX ORDER — ENOXAPARIN SODIUM 100 MG/ML
40 INJECTION SUBCUTANEOUS DAILY
Status: DISCONTINUED | OUTPATIENT
Start: 2023-03-06 | End: 2023-03-07 | Stop reason: HOSPADM

## 2023-03-06 RX ORDER — LISINOPRIL 20 MG/1
40 TABLET ORAL DAILY
Status: DISCONTINUED | OUTPATIENT
Start: 2023-03-06 | End: 2023-03-07 | Stop reason: HOSPADM

## 2023-03-06 RX ORDER — PANTOPRAZOLE SODIUM 40 MG/1
40 TABLET, DELAYED RELEASE ORAL
Status: DISCONTINUED | OUTPATIENT
Start: 2023-03-07 | End: 2023-03-07 | Stop reason: HOSPADM

## 2023-03-06 RX ORDER — CARVEDILOL 12.5 MG/1
25 TABLET ORAL 2 TIMES DAILY WITH MEALS
Status: DISCONTINUED | OUTPATIENT
Start: 2023-03-06 | End: 2023-03-07 | Stop reason: HOSPADM

## 2023-03-06 RX ORDER — METHYLPREDNISOLONE SODIUM SUCCINATE 40 MG/ML
40 INJECTION, POWDER, LYOPHILIZED, FOR SOLUTION INTRAMUSCULAR; INTRAVENOUS EVERY 8 HOURS
Status: DISCONTINUED | OUTPATIENT
Start: 2023-03-06 | End: 2023-03-06

## 2023-03-06 RX ORDER — LEVALBUTEROL 1.25 MG/.5ML
1.25 SOLUTION, CONCENTRATE RESPIRATORY (INHALATION)
Status: DISCONTINUED | OUTPATIENT
Start: 2023-03-06 | End: 2023-03-07 | Stop reason: HOSPADM

## 2023-03-06 RX ORDER — METHYLPREDNISOLONE SODIUM SUCCINATE 40 MG/ML
40 INJECTION, POWDER, LYOPHILIZED, FOR SOLUTION INTRAMUSCULAR; INTRAVENOUS EVERY 12 HOURS SCHEDULED
Status: DISCONTINUED | OUTPATIENT
Start: 2023-03-07 | End: 2023-03-07 | Stop reason: HOSPADM

## 2023-03-06 RX ORDER — METHYLPREDNISOLONE SODIUM SUCCINATE 125 MG/2ML
125 INJECTION, POWDER, LYOPHILIZED, FOR SOLUTION INTRAMUSCULAR; INTRAVENOUS ONCE
Status: COMPLETED | OUTPATIENT
Start: 2023-03-06 | End: 2023-03-06

## 2023-03-06 RX ORDER — BENZONATATE 100 MG/1
100 CAPSULE ORAL 3 TIMES DAILY PRN
Status: DISCONTINUED | OUTPATIENT
Start: 2023-03-06 | End: 2023-03-07 | Stop reason: HOSPADM

## 2023-03-06 RX ORDER — FENOFIBRATE 145 MG/1
145 TABLET, COATED ORAL DAILY
Status: DISCONTINUED | OUTPATIENT
Start: 2023-03-06 | End: 2023-03-07 | Stop reason: HOSPADM

## 2023-03-06 RX ORDER — GUAIFENESIN 600 MG/1
1200 TABLET, EXTENDED RELEASE ORAL 2 TIMES DAILY
Status: DISCONTINUED | OUTPATIENT
Start: 2023-03-06 | End: 2023-03-07 | Stop reason: HOSPADM

## 2023-03-06 RX ORDER — ESCITALOPRAM OXALATE 10 MG/1
20 TABLET ORAL DAILY
Status: DISCONTINUED | OUTPATIENT
Start: 2023-03-06 | End: 2023-03-07 | Stop reason: HOSPADM

## 2023-03-06 RX ORDER — VILAZODONE HYDROCHLORIDE 20 MG/1
40 TABLET ORAL
Status: DISCONTINUED | OUTPATIENT
Start: 2023-03-07 | End: 2023-03-07 | Stop reason: HOSPADM

## 2023-03-06 RX ORDER — AMLODIPINE BESYLATE 10 MG/1
10 TABLET ORAL DAILY
Status: DISCONTINUED | OUTPATIENT
Start: 2023-03-07 | End: 2023-03-07 | Stop reason: HOSPADM

## 2023-03-06 RX ORDER — ALBUTEROL SULFATE 2.5 MG/3ML
5 SOLUTION RESPIRATORY (INHALATION) ONCE
Status: COMPLETED | OUTPATIENT
Start: 2023-03-06 | End: 2023-03-06

## 2023-03-06 RX ORDER — NICOTINE 21 MG/24HR
1 PATCH, TRANSDERMAL 24 HOURS TRANSDERMAL DAILY
Status: DISCONTINUED | OUTPATIENT
Start: 2023-03-06 | End: 2023-03-07 | Stop reason: HOSPADM

## 2023-03-06 RX ORDER — ASPIRIN 81 MG/1
81 TABLET ORAL DAILY
Status: DISCONTINUED | OUTPATIENT
Start: 2023-03-06 | End: 2023-03-07 | Stop reason: HOSPADM

## 2023-03-06 RX ADMIN — ENOXAPARIN SODIUM 40 MG: 40 INJECTION SUBCUTANEOUS at 16:38

## 2023-03-06 RX ADMIN — IPRATROPIUM BROMIDE 0.5 MG: 0.5 SOLUTION RESPIRATORY (INHALATION) at 15:00

## 2023-03-06 RX ADMIN — IPRATROPIUM BROMIDE 0.5 MG: 0.5 SOLUTION RESPIRATORY (INHALATION) at 13:24

## 2023-03-06 RX ADMIN — METHYLPREDNISOLONE SODIUM SUCCINATE 125 MG: 125 INJECTION, POWDER, FOR SOLUTION INTRAMUSCULAR; INTRAVENOUS at 13:24

## 2023-03-06 RX ADMIN — LISINOPRIL 40 MG: 20 TABLET ORAL at 16:38

## 2023-03-06 RX ADMIN — FENOFIBRATE 145 MG: 145 TABLET, FILM COATED ORAL at 16:38

## 2023-03-06 RX ADMIN — ALBUTEROL SULFATE 5 MG: 2.5 SOLUTION RESPIRATORY (INHALATION) at 13:24

## 2023-03-06 RX ADMIN — IPRATROPIUM BROMIDE 0.5 MG: 0.5 SOLUTION RESPIRATORY (INHALATION) at 19:28

## 2023-03-06 RX ADMIN — LEVALBUTEROL HYDROCHLORIDE 1.25 MG: 1.25 SOLUTION, CONCENTRATE RESPIRATORY (INHALATION) at 19:28

## 2023-03-06 RX ADMIN — GUAIFENESIN 1200 MG: 600 TABLET ORAL at 19:20

## 2023-03-06 RX ADMIN — BUPROPION HYDROCHLORIDE TABLETS 75 MG: 75 TABLET, FILM COATED ORAL at 21:18

## 2023-03-06 RX ADMIN — SODIUM CHLORIDE 500 ML: 0.9 INJECTION, SOLUTION INTRAVENOUS at 11:29

## 2023-03-06 RX ADMIN — LEVALBUTEROL HYDROCHLORIDE 1.25 MG: 1.25 SOLUTION, CONCENTRATE RESPIRATORY (INHALATION) at 15:00

## 2023-03-06 RX ADMIN — ESCITALOPRAM OXALATE 20 MG: 10 TABLET ORAL at 16:38

## 2023-03-06 RX ADMIN — CARVEDILOL 25 MG: 12.5 TABLET, FILM COATED ORAL at 16:38

## 2023-03-06 RX ADMIN — ZANUBRUTINIB 160 MG: 80 CAPSULE, GELATIN COATED ORAL at 21:18

## 2023-03-06 RX ADMIN — ASPIRIN 81 MG: 81 TABLET, COATED ORAL at 16:38

## 2023-03-06 NOTE — ED PROVIDER NOTES
History  Chief Complaint   Patient presents with   • Weakness - Generalized     Pt BiBA for generalized weakness, cough, congestion for the past 3 days  Chronic 4L of O2  Productive cough     69 y o  female with past medical history significant for COPD, chf, HTN, chronic back pain and CLL presents to ED with chief complaint of generalized weakness, cough with increasing mucus production and SOB  Onset of symptoms reported as 3 days ago  Location of symptoms reported as chest, upper respiratory tract  Quality is reported as increasing mucus production, shortness of breath, generalized weakness  Severity is reported as moderate  Associated symptoms: denies syncope, denies chest pain, denies fevers  Modifying factors: tried mucinex at home without relief  Symptoms worse with lying flat    Context: Pt arrived by ambulance for generalized weakness, cough, congestion for the past 3 days  Chronic 4L of O2  Productive cough,  Has home health care services at home but was has been declining over past 3 days due to increasing weakness, cough and congestion         History provided by:  Patient, relative and EMS personnel   used: No        Prior to Admission Medications   Prescriptions Last Dose Informant Patient Reported? Taking? Calcium-Magnesium-Vitamin D (CALCIUM 500 PO)   Yes No   Sig: Take 1,000 mg by mouth daily   HYDROcodone-acetaminophen (NORCO) 5-325 mg per tablet   Yes No   Sig: Take 1 tablet by mouth every 6 (six) hours as needed for pain   albuterol (VENTOLIN HFA) 90 mcg/act inhaler   No No   Sig: Inhale 2 puffs every 6 (six) hours as needed for wheezing   alendronate (FOSAMAX) 35 mg tablet   Yes No   Sig: Take 35 mg by mouth every 7 days Patient takes this med every Sunday     Patient not taking: Reported on 8/27/2021   amLODIPine (NORVASC) 5 mg tablet   No No   Sig: Take 1 tablet (5 mg total) by mouth daily   Patient not taking: Reported on 3/30/2021   ascorbic acid (VITAMIN C) 500 mg tablet   Yes No   Sig: Take 500 mg by mouth daily   Patient not taking: Reported on 8/27/2021   aspirin (ECOTRIN LOW STRENGTH) 81 mg EC tablet   No No   Sig: Take 1 tablet (81 mg total) by mouth daily   budesonide-formoterol (SYMBICORT) 160-4 5 mcg/act inhaler   No No   Sig: Inhale 2 puffs 2 (two) times a day Rinse mouth after use     Patient not taking: Reported on 3/30/2021   carvedilol (COREG) 6 25 mg tablet   No No   Sig: Take 1 tablet (6 25 mg total) by mouth 2 (two) times a day with meals   cholecalciferol (VITAMIN D3) 1,000 units tablet   No No   Sig: Take 1 tablet (1,000 Units total) by mouth daily   Patient not taking: Reported on 3/30/2021   enalapril (VASOTEC) 5 mg tablet   No No   Sig: Take 1 tablet (5 mg total) by mouth 2 (two) times a day   escitalopram (LEXAPRO) 10 mg tablet   No No   Sig: Take 1 tablet (10 mg total) by mouth daily   Patient taking differently: Take 20 mg by mouth daily    ferrous sulfate 325 (65 Fe) mg tablet   Yes No   Sig: Take 325 mg by mouth daily with breakfast   Patient not taking: Reported on 8/27/2021   furosemide (LASIX) 20 mg tablet   No No   Sig: Take 1 tablet (20 mg total) by mouth daily   guaiFENesin (MUCINEX) 600 mg 12 hr tablet   No No   Sig: Take 1 tablet (600 mg total) by mouth 2 (two) times a day   Patient not taking: Reported on 3/30/2021   hydrochlorothiazide (HYDRODIURIL) 25 mg tablet   No No   Sig: Take 1 tablet (25 mg total) by mouth daily   Patient not taking: Reported on 3/30/2021   levalbuterol (XOPENEX) 1 25 mg/0 5 mL nebulizer solution   No No   Sig: Take 0 5 mL (1 25 mg total) by nebulization 3 (three) times a day   Patient not taking: Reported on 8/27/2021   melatonin 3 mg   No No   Sig: Take 1 tablet (3 mg total) by mouth daily at bedtime as needed (insomnia)   Patient not taking: Reported on 3/30/2021   nitroglycerin (NITROSTAT) 0 4 mg SL tablet   Yes No   Sig: Place 0 4 mg under the tongue every 5 (five) minutes as needed for chest pain   prasugrel (EFFIENT) tablet   Yes No   Sig: Take 10 mg by mouth   Patient not taking: Reported on 8/27/2021   pravastatin (PRAVACHOL) 20 mg tablet   No No   Sig: Take 1 tablet (20 mg total) by mouth daily with dinner   predniSONE 20 mg tablet   No No   Sig: Take 1 tablet (20 mg total) by mouth daily   Patient not taking: Reported on 3/30/2021   sodium chloride 0 9 % nebulizer solution   No No   Sig: Take 3 mL by nebulization 3 (three) times a day   vilazodone (Viibryd) 40 mg tablet   Yes No   Sig: Take 40 mg by mouth daily with breakfast      Facility-Administered Medications: None       Past Medical History:   Diagnosis Date   • Acute congestive heart failure (HCC) 8/27/2021   • Anxiety    • Cardiac disease    • Chest pain 8/27/2021   • Chronic pain disorder    • COPD (chronic obstructive pulmonary disease) ([facility])    • Depression    • Heart disease    • Hyperlipidemia    • Hypertension    • MI (myocardial infarction) ([facility] [address] )    • MRSA (methicillin resistant Staphylococcus aureus)    • Renal disorder     benign kidney tumor       Past Surgical History:   Procedure Laterality Date   • APPENDECTOMY     • ELBOW BURSA SURGERY Left 02/2018    D/T MRSA INFECTION   • SPINAL FUSION      L7 L9       Family History   Problem Relation Age of Onset   • Heart disease Mother    • Cancer Father      I have reviewed and agree with the history as documented  E-Cigarette/Vaping     E-Cigarette/Vaping Substances     Social History     Tobacco Use   • Smoking status: Every Day     Packs/day: 1 00     Years: 60 00     Pack years: 60 00     Types: Cigarettes   • Smokeless tobacco: Never   • Tobacco comments:     She has close to a 60 pack-year smoking history, most recently has cut down to 4-5 cigarettes daily   Substance Use Topics   • Alcohol use: Never   • Drug use: No       Review of Systems   Constitutional: Negative for chills and fever  HENT: Positive for congestion  Respiratory: Positive for cough, shortness of breath and wheezing  Negative for choking, chest tightness and stridor  Cardiovascular: Negative for chest pain  Gastrointestinal: Negative for constipation, diarrhea, nausea and vomiting  Musculoskeletal: Negative for gait problem  Skin: Negative for rash  Neurological: Positive for weakness  Negative for seizures, syncope, facial asymmetry and numbness  All other systems reviewed and are negative  Physical Exam  Physical Exam  Vitals and nursing note reviewed  Constitutional:       General: She is not in acute distress  Appearance: Normal appearance  Comments: /78 (BP Location: Right arm)   Pulse 86   Temp 98 3 °F (36 8 °C) (Oral)   Resp 22   SpO2 100%      HENT:      Head: Normocephalic and atraumatic  Right Ear: External ear normal       Left Ear: External ear normal       Nose: Nose normal       Mouth/Throat:      Mouth: Mucous membranes are dry  Comments: Yellow mucus drainage noted to posterior pharynx  Eyes:      General: No scleral icterus  Right eye: No discharge  Left eye: No discharge  Cardiovascular:      Rate and Rhythm: Normal rate  Pulses: Normal pulses  Pulmonary:      Effort: Pulmonary effort is normal       Breath sounds: Wheezing and rhonchi present  Musculoskeletal:         General: No tenderness, deformity or signs of injury  Normal range of motion  Cervical back: Normal range of motion and neck supple  No rigidity or tenderness  Skin:     General: Skin is dry  Capillary Refill: Capillary refill takes less than 2 seconds  Coloration: Skin is pale  Skin is not jaundiced  Findings: No erythema or rash  Neurological:      General: No focal deficit present  Mental Status: She is alert and oriented to person, place, and time  Mental status is at baseline  Sensory: No sensory deficit  Psychiatric:         Mood and Affect: Mood normal          Behavior: Behavior normal          Thought Content:  Thought content normal          Vital Signs  ED Triage Vitals [03/06/23 0933]   Temperature Pulse Respirations Blood Pressure SpO2   98 3 °F (36 8 °C) 84 22 138/76 92 %      Temp Source Heart Rate Source Patient Position - Orthostatic VS BP Location FiO2 (%)   Oral Monitor Sitting Right arm --      Pain Score       --           Vitals:    03/06/23 0945 03/06/23 1100 03/06/23 1200 03/06/23 1300   BP: 138/76 139/71 156/76 158/78   Pulse: 83 85 84 86   Patient Position - Orthostatic VS:  Lying           Visual Acuity  Visual Acuity    Flowsheet Row Most Recent Value   L Pupil Size (mm) 3   R Pupil Size (mm) 3          ED Medications  Medications   sodium chloride 0 9 % bolus 500 mL (0 mL Intravenous Stopped 3/6/23 1312)   albuterol inhalation solution 5 mg (5 mg Nebulization Given 3/6/23 1324)   ipratropium (ATROVENT) 0 02 % inhalation solution 0 5 mg (0 5 mg Nebulization Given 3/6/23 1324)   methylPREDNISolone sodium succinate (Solu-MEDROL) injection 125 mg (125 mg Intravenous Given 3/6/23 1324)       Diagnostic Studies  Results Reviewed     Procedure Component Value Units Date/Time    HS Troponin I 2hr [[id number]]  (Normal) Collected: 03/06/23 1208    Lab Status: Final result Specimen: Blood from Arm, Right Updated: 03/06/23 1253     hs TnI 2hr 10 ng/L      Delta 2hr hsTnI 0 ng/L     HS Troponin I 4hr [[id number]]     Lab Status: No result Specimen: Blood     B-Type Natriuretic Peptide(BNP) [[id number]]  (Abnormal) Collected: 03/06/23 1019    Lab Status: Final result Specimen: Blood from Arm, Left Updated: 03/06/23 1112      pg/mL     FLU/RSV/COVID - if FLU/RSV clinically relevant [[id number]]  (Normal) Collected: 03/06/23 1019    Lab Status: Final result Specimen: Nares from Nose Updated: 03/06/23 1105     SARS-CoV-2 Negative     INFLUENZA A PCR Negative     INFLUENZA B PCR Negative     RSV PCR Negative    Narrative:      FOR PEDIATRIC PATIENTS - copy/paste COVID Guidelines URL to browser: [email or web address]    SARS-CoV-2 assay is a Nucleic Acid Amplification assay intended for the  qualitative detection of nucleic acid from SARS-CoV-2 in nasopharyngeal  swabs  Results are for the presumptive identification of SARS-CoV-2 RNA  Positive results are indicative of infection with SARS-CoV-2, the virus  causing COVID-19, but do not rule out bacterial infection or co-infection  with other viruses  Laboratories within the [place] and its  territories are required to report all positive results to the appropriate  public health authorities  Negative results do not preclude SARS-CoV-2  infection and should not be used as the sole basis for treatment or other  patient management decisions  Negative results must be combined with  clinical observations, patient history, and epidemiological information  This test has not been FDA cleared or approved  This test has been authorized by FDA under an Emergency Use Authorization  (EUA)  This test is only authorized for the duration of time the  declaration that circumstances exist justifying the authorization of the  emergency use of an in vitro diagnostic tests for detection of SARS-CoV-2  virus and/or diagnosis of COVID-19 infection under section 564(b)(1) of  the Act, 21 U  S C  134HWD-6(F)(2), unless the authorization is terminated  or revoked sooner  The test has been validated but independent review by FDA  and CLIA is pending  Test performed using [employer] Gene[employer]pert: This RT-PCR assay targets N2,  a region unique to SARS-CoV-2  A conserved region in the E-gene was chosen  for pan-Sarbecovirus detection which includes SARS-CoV-2  According to CMS-2020-01-R, this platform meets the definition of high-throughput technology      HS Troponin 0hr (reflex protocol) [[id number]]  (Normal) Collected: 03/06/23 1019    Lab Status: Final result Specimen: Blood from Arm, Left Updated: 03/06/23 1054     hs TnI 0hr 10 ng/L     Comprehensive metabolic panel [[id number]]  (Abnormal) Collected: 03/06/23 1019    Lab Status: Final result Specimen: Blood from Arm, Left Updated: 03/06/23 1046     Sodium 140 mmol/L      Potassium 3 9 mmol/L      Chloride 99 mmol/L      CO2 38 mmol/L      ANION GAP 3 mmol/L      BUN 10 mg/dL      Creatinine 0 69 mg/dL      Glucose 112 mg/dL      Calcium 9 3 mg/dL      AST 9 U/L      ALT 3 U/L      Alkaline Phosphatase 63 U/L      Total Protein 6 9 g/dL      Albumin 3 8 g/dL      Total Bilirubin 0 59 mg/dL      eGFR 84 ml/min/1 73sq m     Narrative:      [place] guidelines for Chronic Kidney Disease (CKD):   •  Stage 1 with normal or high GFR (GFR > 90 mL/min/1 73 square meters)  •  Stage 2 Mild CKD (GFR = 60-89 mL/min/1 73 square meters)  •  Stage 3A Moderate CKD (GFR = 45-59 mL/min/1 73 square meters)  •  Stage 3B Moderate CKD (GFR = 30-44 mL/min/1 73 square meters)  •  Stage 4 Severe CKD (GFR = 15-29 mL/min/1 73 square meters)  •  Stage 5 End Stage CKD (GFR <15 mL/min/1 73 square meters)  Note: GFR calculation is accurate only with a steady state creatinine    Lactic acid [[id number]]  (Normal) Collected: 03/06/23 1019    Lab Status: Final result Specimen: Blood from Arm, Left Updated: 03/06/23 1045     LACTIC ACID 0 5 mmol/L     Narrative:      Result may be elevated if tourniquet was used during collection      CBC and differential [[id number]]  (Abnormal) Collected: 03/06/23 1019    Lab Status: Final result Specimen: Blood from Arm, Left Updated: 03/06/23 1028     WBC 10 15 Thousand/uL      RBC 2 62 Million/uL      Hemoglobin 8 1 g/dL      Hematocrit 26 6 %       fL      MCH 30 9 pg      MCHC 30 5 g/dL      RDW 13 3 %      MPV 10 2 fL      Platelets 891 Thousands/uL      nRBC 0 /100 WBCs      Neutrophils Relative 43 %      Immat GRANS % 0 %      Lymphocytes Relative 47 %      Monocytes Relative 9 %      Eosinophils Relative 1 %      Basophils Relative 0 % Neutrophils Absolute 4 40 Thousands/µL      Immature Grans Absolute 0 02 Thousand/uL      Lymphocytes Absolute 4 64 Thousands/µL      Monocytes Absolute 0 94 Thousand/µL      Eosinophils Absolute 0 12 Thousand/µL      Basophils Absolute 0 03 Thousands/µL     Procalcitonin [[id number]] Collected: 03/06/23 1019    Lab Status: In process Specimen: Blood from Arm, Left Updated: 03/06/23 1025    Blood culture #2 [[id number]] Collected: 03/06/23 1019    Lab Status: In process Specimen: Blood from Arm, Left Updated: 03/06/23 1024    Blood culture #1 [[id number]] Collected: 03/06/23 1019    Lab Status:  In process Specimen: Blood from Arm, Right Updated: 03/06/23 1024                 XR chest 2 views    (Results Pending)              Procedures  ECG 12 Lead Documentation Only    Date/Time: 3/6/2023 9:39 AM  Performed by: Wali Davis PA-C  Authorized by: Wali Davis PA-C     Indications / Diagnosis:  SOB  ECG reviewed by me, the ED Provider: yes    Patient location:  ED  Previous ECG:     Previous ECG:  Unavailable    Comparison to cardiac monitor: Yes    Interpretation:     Interpretation: normal    Rate:     ECG rate:  84    ECG rate assessment: normal    Rhythm:     Rhythm: sinus rhythm    Ectopy:     Ectopy: none    QRS:     QRS axis:  Normal    QRS intervals:  Normal  Conduction:     Conduction: normal    ST segments:     ST segments:  Normal  T waves:     T waves: normal               ED Course             HEART Risk Score    Flowsheet Row Most Recent Value   Heart Score Risk Calculator    History 0 Filed at: 03/06/2023 1414   ECG 1 Filed at: 03/06/2023 1414   Age 2 Filed at: 03/06/2023 1414   Risk Factors 2 Filed at: 03/06/2023 1414   Troponin 0 Filed at: 03/06/2023 1414   HEART Score 5 Filed at: 03/06/2023 1414        Identification of Seniors at [facility] Most Recent Value   (ISAR) Identification of Seniors at Risk    Before the illness or injury that brought you to the Emergency, did you need someone to help you on a regular basis? 1 Filed at: 03/06/2023 0936   In the last 24 hours, have you needed more help than usual? 1 Filed at: 03/06/2023 1069   Have you been hospitalized for one or more nights during the past 6 months? 1 Filed at: 03/06/2023 0936   In general, do you see well? 1 Filed at: 03/06/2023 0936   In general, do you have serious problems with your memory? 0 Filed at: 03/06/2023 5154   Do you take more than three different medications every day? 1 Filed at: 03/06/2023 0936   ISAR Score 5 Filed at: 03/06/2023 8637                      SBIRT 20yo+    Flowsheet Row Most Recent Value   SBIRT (25 yo +)    In order to provide better care to our patients, we are screening all of our patients for alcohol and drug use  Would it be okay to ask you these screening questions? Yes Filed at: 03/06/2023 5360   Initial Alcohol Screen: US AUDIT-C     1  How often do you have a drink containing alcohol? 0 Filed at: 03/06/2023 0952   2  How many drinks containing alcohol do you have on a typical day you are drinking? 0 Filed at: 03/06/2023 0952   3a  Male UNDER 65: How often do you have five or more drinks on one occasion? 0 Filed at: 03/06/2023 0952   3b  FEMALE Any Age, or MALE 65+: How often do you have 4 or more drinks on one occassion? 0 Filed at: 03/06/2023 3306   Audit-C Score 0 Filed at: 03/06/2023 8924   NIA: How many times in the past year have you    Used an illegal drug or used a prescription medication for non-medical reasons?  Never Filed at: 03/06/2023 5318                    Medical Decision Making  MDM:     DDX includes but is not limited to: Dehydration, electrolyte abnormality, deconditioning, CVA/TIA, infection, anemia, thyroid disorder, cardiac disorder, dysrhythmia, pneumonia, COPD exacerbation, chronic bronchitis, emphysema, asthma exacerbation, interstitial lung disease, bronchiectasis, congestive heart failure, ACS, pleural effusion, influenza, viral syndrome, GERD, post nasal drip, pericardial effusion, pericarditis,     ED plan SOB workup including labs, cxr, labs to evaluate for anemia, troponin, EKG    ED results:   I have reviewed the patient's vital signs, nursing notes, and other relevant ancillary testing/information  I have had a detailed discussion with the patient regarding the historical points, examination findings, and any diagnostic results    Relevant results: troponin 10,  Lactic acid 0 5   2 hour troponin 10 - delta 0, hgb 8 1, hct 26 6 low but consistent with patient's baseline  Bun 10, creat 0 69 no renal failure  Chest x-ray interpretation: No acute infiltrate or pneumothorax   ekg images independently visualized and interpreted by me  I was the initial   Normal sinus rhythm  No STEMI  ED summary: 41-year-old female with history of COPD, CLL, presents with increasing shortness of breath, cough and generalized weakness over the past 3 days  Increasing sputum production contributing to coughing/shorthess of breath  Patient with difficulty clearing mucus  CXR no infiltrate  Labs reviewed  No STEMI  ACS workup negative  D/w son at bedside and patient - symptoms most consistent with COPD exacerbation likely 2/2 increasing mucus production  Patient declining at home despite home health and physical therapy services  IV solumedrol and bronchodilator treatments given in ED  Patient still with significant secretions  Will admit for further treatment of COPD exacerbation and further evaluation of weakness  Case discussed with STEVE Londono regarding admission             Amount and/or Complexity of Data Reviewed  Labs: ordered  Radiology: ordered  Risk  Prescription drug management            Disposition  Final diagnoses:   COPD exacerbation ([address] )   Weakness     Time reflects when diagnosis was documented in both MDM as applicable and the Disposition within this note     Time User Action Codes Description Comment    3/6/2023  2:17 PM Davi Naylor Add [J44 1] COPD exacerbation ([address] )     3/6/2023  2:17 PM Davi Naylor Add [R53 1] Weakness       ED Disposition     ED Disposition   Admit    Condition   Stable    Date/Time   Mon Mar 6, 2023  2:17 PM    Comment   Case was discussed with Dr Pascual Franklin and the patient's admission status was agreed to be Admission Status: observation status to the service of Dr Pascual Franklin   Follow-up Information    None         Patient's Medications   Discharge Prescriptions    No medications on file       No discharge procedures on file      PDMP Review       Value Time User    PDMP Reviewed  Yes 8/27/2021  3:13 AM Jeffy Fernandes PA-C          ED Provider  Electronically Signed by           Jami Garvin PA-C  03/06/23 4711

## 2023-03-06 NOTE — ED NOTES
Patient transported to [facility], RN  03/06/23 102

## 2023-03-06 NOTE — ASSESSMENT & PLAN NOTE
· Stable on chronic O2 needs at 4 LNC  · Respiratory protocol  · Treatment of COPD exacerbation as above

## 2023-03-06 NOTE — ASSESSMENT & PLAN NOTE
· Presents with progressive worsening shortness of breath and sputum production    Denies fevers or chills  · Last hospitalization for COPD back in January 2023 at [facility]  · COVID/flu/RSV negative  · Monitor off abx, check procalcitonin  · Consider azithromycin for anti-inflammatory benefits  · Scheduled nebs, IV steroids  · Respiratory protocol, IS  · Stable currently at baseline chronic O2 needs 4LNC

## 2023-03-06 NOTE — ASSESSMENT & PLAN NOTE
· Suspect in the setting of underlying malignancy, no reports of bleeding   Follows with Oncology outpatient  · Check iron panel  · Monitor CBC

## 2023-03-06 NOTE — ASSESSMENT & PLAN NOTE
· Continue home amlodipine 10 mg daily, enalapril 20 mg daily (per formulary will switch to lisinopril)  · BP stable, continue to monitor

## 2023-03-06 NOTE — H&P
[address] [facility]  H&P- Jerome Greene 1945, 69 y o  female MRN: [id number]  Unit/Bed#: -[facility] Encounter: [id number]  Primary Care Provider: Alanna Asencio MD   Date and time admitted to hospital: 3/6/2023  9:30 AM    * COPD with acute exacerbation [facility])  Assessment & Plan  · Presents with progressive worsening shortness of breath and sputum production  Denies fevers or chills  · Last hospitalization for COPD back in January 2023 at [facility]  · COVID/flu/RSV negative  · Monitor off abx, check procalcitonin  · Consider azithromycin for anti-inflammatory benefits  · Scheduled nebs, IV steroids  · Respiratory protocol, IS  · Stable currently at baseline chronic O2 needs 4LNC    Waldenstrom macroglobulinemia (HCC)  Assessment & Plan  · Stable  Continue outpatient follow up with Oncology    Anemia  Assessment & Plan  · Suspect in the setting of underlying malignancy, no reports of bleeding  Follows with Oncology outpatient  · Check iron panel  · Monitor CBC    Chronic respiratory failure with hypoxia (HCC)  Assessment & Plan  · Stable on chronic O2 needs at 4 LNC  · Respiratory protocol  · Treatment of COPD exacerbation as above    Tobacco abuse  Assessment & Plan  · Smokes a pack per day  · Counseled regarding cessation for 5 minutes  · Nicotine patch    Essential hypertension  Assessment & Plan  · Continue home amlodipine 10 mg daily, enalapril 20 mg daily (per formulary will switch to lisinopril)  · BP stable, continue to monitor    Ambulatory dysfunction  Assessment & Plan  · Reports ongoing weakness for the past 2 to 3 days  · PT/OT    VTE Pharmacologic Prophylaxis: VTE Score: 4 Moderate Risk (Score 3-4) - Pharmacological DVT Prophylaxis Ordered: enoxaparin (Lovenox)  Code Status: Level 3 - DNAR and DNI  Discussion with family: Patient declined call to        Anticipated Length of Stay: Patient will be admitted on an observation basis with an anticipated length of stay of less than 2 midnights secondary to copd exacarbation, gen weakness  Total Time Spent on Date of Encounter in care of patient: 55 minutes This time was spent on one or more of the following: performing physical exam; counseling and coordination of care; obtaining or reviewing history; documenting in the medical record; reviewing/ordering tests, medications or procedures; communicating with other healthcare professionals and discussing with patient's family/caregivers  Chief Complaint: shortness of breath, productive cough, weakness    History of Present Illness:  Jesse Rios is a 69 y o  female with a PMH of COPD, chronic respiratory failure, hypertension, Jojo Greening macroglobulinemia, who presents with shortness of breath  Patient also complaining of productive cough  Also reports generalized weakness that has been progressively worsening over the past 2 to 3 days  Denies fevers or chills  Denies chest pain or abdominal pain  No nausea or vomiting       Review of Systems:  Review of Systems   Constitutional: Positive for fatigue  Negative for chills and fever  HENT: Positive for congestion  Negative for drooling, tinnitus and trouble swallowing  Eyes: Negative for pain and visual disturbance  Respiratory: Positive for cough, shortness of breath and wheezing  Cardiovascular: Negative for chest pain and leg swelling  Gastrointestinal: Negative for abdominal pain, nausea and vomiting  Genitourinary: Negative for dysuria and hematuria  Musculoskeletal: Negative for neck pain and neck stiffness  Skin: Negative for color change  Neurological: Positive for weakness  Negative for dizziness and light-headedness  Hematological: Negative for adenopathy  Psychiatric/Behavioral: Negative for confusion and hallucinations  All other systems reviewed and are negative        Past Medical and Surgical History:   Past Medical History:   Diagnosis Date   • Acute congestive heart failure ([facility] [address] ) 8/27/2021 • Anxiety    • Cardiac disease    • Chest pain 8/27/2021   • Chronic pain disorder    • COPD (chronic obstructive pulmonary disease) ([facility])    • Depression    • Heart disease    • Hyperlipidemia    • Hypertension    • MI (myocardial infarction) ([facility] [address] )    • MRSA (methicillin resistant Staphylococcus aureus)    • Renal disorder     benign kidney tumor       Past Surgical History:   Procedure Laterality Date   • APPENDECTOMY     • ELBOW BURSA SURGERY Left 02/2018    D/T MRSA INFECTION   • SPINAL FUSION      L7 L9       Meds/Allergies:  Prior to Admission medications    Medication Sig Start Date End Date Taking? Authorizing Provider   albuterol (VENTOLIN HFA) 90 mcg/act inhaler Inhale 2 puffs every 6 (six) hours as needed for wheezing 1/7/20   Dar Garcia PA-C   alendronate (FOSAMAX) 35 mg tablet Take 35 mg by mouth every 7 days Patient takes this med every Sunday  Patient not taking: Reported on 8/27/2021    Historical Provider, MD   amLODIPine (NORVASC) 5 mg tablet Take 1 tablet (5 mg total) by mouth daily  Patient not taking: Reported on 3/30/2021 1/7/20   Dar Garcia PA-C   ascorbic acid (VITAMIN C) 500 mg tablet Take 500 mg by mouth daily  Patient not taking: Reported on 8/27/2021    Historical Provider, MD   aspirin (ECOTRIN LOW STRENGTH) 81 mg EC tablet Take 1 tablet (81 mg total) by mouth daily 8/31/21 9/30/21  Merrill Barker MD   budesonide-formoterol [facility]) 160-4 5 mcg/act inhaler Inhale 2 puffs 2 (two) times a day Rinse mouth after use    Patient not taking: Reported on 3/30/2021 1/7/20   Dar Garcia PA-C   Calcium-Magnesium-Vitamin D (CALCIUM 500 PO) Take 1,000 mg by mouth daily    Historical Provider, MD   carvedilol (COREG) 6 25 mg tablet Take 1 tablet (6 25 mg total) by mouth 2 (two) times a day with meals 8/30/21 9/29/21  Merrill Barker MD   cholecalciferol (VITAMIN D3) 1,000 units tablet Take 1 tablet (1,000 Units total) by mouth daily  Patient not taking: Reported on 3/30/2021 1/8/20   Le Garcia PA-C   enalapril (VASOTEC) 5 mg tablet Take 1 tablet (5 mg total) by mouth 2 (two) times a day 1/8/20   Le Garcia PA-C   escitalopram (LEXAPRO) 10 mg tablet Take 1 tablet (10 mg total) by mouth daily  Patient taking differently: Take 20 mg by mouth daily  1/8/20   TAMMIE Bonner   ferrous sulfate 325 (65 Fe) mg tablet Take 325 mg by mouth daily with breakfast  Patient not taking: Reported on 8/27/2021    Historical Provider, MD   furosemide (LASIX) 20 mg tablet Take 1 tablet (20 mg total) by mouth daily 8/31/21 9/30/21  Maik Stearns MD   guaiFENesin (MUCINEX) 600 mg 12 hr tablet Take 1 tablet (600 mg total) by mouth 2 (two) times a day  Patient not taking: Reported on 3/30/2021 12/23/18   Abel Delacruz MD   hydrochlorothiazide (HYDRODIURIL) 25 mg tablet Take 1 tablet (25 mg total) by mouth daily  Patient not taking: Reported on 3/30/2021 1/7/20   Le Garcia PA-C   HYDROcodone-acetaminophen (NORCO) 5-325 mg per tablet Take 1 tablet by mouth every 6 (six) hours as needed for pain    Historical Provider, MD   levalbuterol (XOPENEX) 1 25 mg/0 5 mL nebulizer solution Take 0 5 mL (1 25 mg total) by nebulization 3 (three) times a day  Patient not taking: Reported on 8/27/2021 12/23/18   Abel Delacruz MD   melatonin 3 mg Take 1 tablet (3 mg total) by mouth daily at bedtime as needed (insomnia)  Patient not taking: Reported on 3/30/2021 1/7/20   TAMMIE Bonner   nitroglycerin (NITROSTAT) 0 4 mg SL tablet Place 0 4 mg under the tongue every 5 (five) minutes as needed for chest pain    Historical Provider, MD   prasugrel (EFFIENT) tablet Take 10 mg by mouth  Patient not taking: Reported on 8/27/2021    Historical Provider, MD   pravastatin (PRAVACHOL) 20 mg tablet Take 1 tablet (20 mg total) by mouth daily with dinner 8/30/21 9/29/21  Maik Stearns MD   predniSONE 20 mg tablet Take 1 tablet (20 mg total) by mouth daily  Patient not taking: Reported on 3/30/2021 1/8/20   August Garcia PA-C   sodium chloride 0 9 % nebulizer solution Take 3 mL by nebulization 3 (three) times a day 12/23/18   Everett Doss MD   vilazodone (Viibryd) 40 mg tablet Take 40 mg by mouth daily with breakfast    Historical Provider, MD     I have reviewed home medications with patient personally  Allergies: Allergies   Allergen Reactions   • Sulfa Antibiotics Anaphylaxis   • Niacin    • Statins Other (See Comments)     cramps       Social History:  Marital Status:    Occupation: NA  Patient Pre-hospital Living Situation: Home  Patient Pre-hospital Diet Restrictions: NA  Substance Use History:   Social History     Substance and Sexual Activity   Alcohol Use Never     Social History     Tobacco Use   Smoking Status Every Day   • Packs/day: 1 00   • Years: 60 00   • Pack years: 60 00   • Types: Cigarettes   Smokeless Tobacco Never   Tobacco Comments    She has close to a 60 pack-year smoking history, most recently has cut down to 4-5 cigarettes daily     Social History     Substance and Sexual Activity   Drug Use No       Family History:  Family History   Problem Relation Age of Onset   • Heart disease Mother    • Cancer Father        Physical Exam:     Vitals:   Blood Pressure: 123/72 (03/06/23 1522)  Pulse: 94 (03/06/23 1522)  Temperature: 98 2 °F (36 8 °C) (03/06/23 1522)  Temp Source: Oral (03/06/23 1522)  Respirations: 18 (03/06/23 1522)  Height: 5' 7" (170 2 cm) (03/06/23 1522)  Weight - Scale: 44 5 kg (98 lb) (03/06/23 1522)  SpO2: 96 % (03/06/23 1522)    Physical Exam  Vitals and nursing note reviewed  Constitutional:       General: She is not in acute distress  Appearance: She is not toxic-appearing  HENT:      Head: Normocephalic and atraumatic  Nose: Nose normal       Mouth/Throat:      Mouth: Mucous membranes are moist       Pharynx: Oropharynx is clear     Eyes:      Conjunctiva/sclera: Conjunctivae normal       Pupils: Pupils are equal, round, and reactive to light  Cardiovascular:      Rate and Rhythm: Normal rate  Pulses: Normal pulses  Pulmonary:      Effort: Pulmonary effort is normal       Breath sounds: Wheezing (minimal) present  Comments: Diminished air entry  Abdominal:      Palpations: Abdomen is soft  Tenderness: There is no abdominal tenderness  There is no guarding  Musculoskeletal:         General: No swelling or tenderness  Normal range of motion  Cervical back: Normal range of motion and neck supple  Skin:     General: Skin is warm and dry  Neurological:      General: No focal deficit present  Mental Status: She is alert and oriented to person, place, and time  Mental status is at baseline  Psychiatric:         Mood and Affect: Mood normal          Thought Content:  Thought content normal          Additional Data:     Lab Results:  Results from last 7 days   Lab Units 03/06/23  1019   WBC Thousand/uL 10 15   HEMOGLOBIN g/dL 8 1*   HEMATOCRIT % 26 6*   PLATELETS Thousands/uL 184   NEUTROS PCT % 43   LYMPHS PCT % 47*   MONOS PCT % 9   EOS PCT % 1     Results from last 7 days   Lab Units 03/06/23  1019   SODIUM mmol/L 140   POTASSIUM mmol/L 3 9   CHLORIDE mmol/L 99   CO2 mmol/L 38*   BUN mg/dL 10   CREATININE mg/dL 0 69   ANION GAP mmol/L 3*   CALCIUM mg/dL 9 3   ALBUMIN g/dL 3 8   TOTAL BILIRUBIN mg/dL 0 59   ALK PHOS U/L 63   ALT U/L 3*   AST U/L 9*   GLUCOSE RANDOM mg/dL 112                 Results from last 7 days   Lab Units 03/06/23  1019   LACTIC ACID mmol/L 0 5       Lines/Drains:  Invasive Devices     Peripheral Intravenous Line  Duration           Peripheral IV 03/06/23 Left;Ventral (anterior) Forearm <1 day                    Imaging: Reviewed radiology reports from this admission including: chest xray and Personally reviewed the following imaging: chest xray  XR chest 2 views    (Results Pending)       EKG and Other Studies Reviewed on Admission:   · EKG: NSR  HR 80s  ** Please Note: This note has been constructed using a voice recognition system   **

## 2023-03-07 VITALS
DIASTOLIC BLOOD PRESSURE: 77 MMHG | HEIGHT: 67 IN | OXYGEN SATURATION: 97 % | WEIGHT: 98 LBS | BODY MASS INDEX: 15.38 KG/M2 | SYSTOLIC BLOOD PRESSURE: 163 MMHG | TEMPERATURE: 98.4 F | HEART RATE: 91 BPM | RESPIRATION RATE: 18 BRPM

## 2023-03-07 LAB
ANION GAP SERPL CALCULATED.3IONS-SCNC: 4 MMOL/L (ref 4–13)
BUN SERPL-MCNC: 13 MG/DL (ref 5–25)
CALCIUM SERPL-MCNC: 9 MG/DL (ref 8.4–10.2)
CHLORIDE SERPL-SCNC: 101 MMOL/L (ref 96–108)
CO2 SERPL-SCNC: 35 MMOL/L (ref 21–32)
CREAT SERPL-MCNC: 0.65 MG/DL (ref 0.6–1.3)
ERYTHROCYTE [DISTWIDTH] IN BLOOD BY AUTOMATED COUNT: 13.2 % (ref 11.6–15.1)
FERRITIN SERPL-MCNC: 197 NG/ML (ref 8–388)
GFR SERPL CREATININE-BSD FRML MDRD: 86 ML/MIN/1.73SQ M
GLUCOSE P FAST SERPL-MCNC: 152 MG/DL (ref 65–99)
GLUCOSE SERPL-MCNC: 152 MG/DL (ref 65–140)
HCT VFR BLD AUTO: 24 % (ref 34.8–46.1)
HGB BLD-MCNC: 7.5 G/DL (ref 11.5–15.4)
IRON SATN MFR SERPL: 26 % (ref 15–50)
IRON SERPL-MCNC: 41 UG/DL (ref 50–170)
MCH RBC QN AUTO: 30.9 PG (ref 26.8–34.3)
MCHC RBC AUTO-ENTMCNC: 31.3 G/DL (ref 31.4–37.4)
MCV RBC AUTO: 99 FL (ref 82–98)
PLATELET # BLD AUTO: 172 THOUSANDS/UL (ref 149–390)
PMV BLD AUTO: 9.7 FL (ref 8.9–12.7)
POTASSIUM SERPL-SCNC: 3.5 MMOL/L (ref 3.5–5.3)
PROCALCITONIN SERPL-MCNC: <0.05 NG/ML
RBC # BLD AUTO: 2.43 MILLION/UL (ref 3.81–5.12)
SODIUM SERPL-SCNC: 140 MMOL/L (ref 135–147)
TIBC SERPL-MCNC: 158 UG/DL (ref 250–450)
WBC # BLD AUTO: 7.64 THOUSAND/UL (ref 4.31–10.16)

## 2023-03-07 RX ORDER — SCOLOPAMINE TRANSDERMAL SYSTEM 1 MG/1
1 PATCH, EXTENDED RELEASE TRANSDERMAL ONCE
Status: DISCONTINUED | OUTPATIENT
Start: 2023-03-07 | End: 2023-03-07 | Stop reason: HOSPADM

## 2023-03-07 RX ORDER — PREDNISONE 20 MG/1
TABLET ORAL
Qty: 6 TABLET | Refills: 0 | Status: SHIPPED | OUTPATIENT
Start: 2023-03-07 | End: 2023-03-13

## 2023-03-07 RX ORDER — BENZONATATE 100 MG/1
100 CAPSULE ORAL 3 TIMES DAILY PRN
Qty: 20 CAPSULE | Refills: 0 | Status: SHIPPED | OUTPATIENT
Start: 2023-03-07

## 2023-03-07 RX ORDER — NICOTINE 21 MG/24HR
1 PATCH, TRANSDERMAL 24 HOURS TRANSDERMAL DAILY
Qty: 28 PATCH | Refills: 0 | Status: SHIPPED | OUTPATIENT
Start: 2023-03-08

## 2023-03-07 RX ADMIN — FENOFIBRATE 145 MG: 145 TABLET, FILM COATED ORAL at 09:09

## 2023-03-07 RX ADMIN — PANTOPRAZOLE SODIUM 40 MG: 40 TABLET, DELAYED RELEASE ORAL at 05:14

## 2023-03-07 RX ADMIN — METHYLPREDNISOLONE SODIUM SUCCINATE 40 MG: 40 INJECTION, POWDER, FOR SOLUTION INTRAMUSCULAR; INTRAVENOUS at 09:09

## 2023-03-07 RX ADMIN — LEVALBUTEROL HYDROCHLORIDE 1.25 MG: 1.25 SOLUTION, CONCENTRATE RESPIRATORY (INHALATION) at 07:23

## 2023-03-07 RX ADMIN — AMLODIPINE BESYLATE 10 MG: 10 TABLET ORAL at 09:09

## 2023-03-07 RX ADMIN — CARVEDILOL 25 MG: 12.5 TABLET, FILM COATED ORAL at 09:09

## 2023-03-07 RX ADMIN — IPRATROPIUM BROMIDE 0.5 MG: 0.5 SOLUTION RESPIRATORY (INHALATION) at 07:23

## 2023-03-07 RX ADMIN — ASPIRIN 81 MG: 81 TABLET, COATED ORAL at 09:09

## 2023-03-07 RX ADMIN — BENZONATATE 100 MG: 100 CAPSULE ORAL at 01:25

## 2023-03-07 RX ADMIN — ENOXAPARIN SODIUM 40 MG: 40 INJECTION SUBCUTANEOUS at 09:09

## 2023-03-07 RX ADMIN — GUAIFENESIN 1200 MG: 600 TABLET ORAL at 09:09

## 2023-03-07 RX ADMIN — SCOPALAMINE 1 PATCH: 1 PATCH, EXTENDED RELEASE TRANSDERMAL at 03:47

## 2023-03-07 RX ADMIN — IRON SUCROSE 100 MG: 20 INJECTION, SOLUTION INTRAVENOUS at 11:14

## 2023-03-07 RX ADMIN — VILAZODONE HYDROCHLORIDE 40 MG: 20 TABLET ORAL at 09:08

## 2023-03-07 RX ADMIN — ZANUBRUTINIB 160 MG: 80 CAPSULE, GELATIN COATED ORAL at 09:08

## 2023-03-07 RX ADMIN — ESCITALOPRAM OXALATE 20 MG: 10 TABLET ORAL at 09:09

## 2023-03-07 RX ADMIN — LISINOPRIL 40 MG: 20 TABLET ORAL at 09:09

## 2023-03-07 NOTE — DISCHARGE SUMMARY
Medical Problems     Resolved Problems  Date Reviewed: 3/6/2023   None       Discharging Physician / Practitioner: Kimi Mejia  PCP: Karely Lou MD  Admission Date:   Admission Orders (From admission, onward)     Ordered        03/06/23 1417  Place in Observation  Once                      Discharge Date: 03/07/23    Consultations During Hospital Stay:  · IP CONSULT TO CASE MANAGEMENT  · PT/OT     Procedures Performed:   · CXR     Significant Findings / Test Results:   · Increased bilateral reticular markings consistent with pulmonary edema secondary to CHF or fluid overloadIncreased bilateral reticular markings consistent with pulmonary edema secondary to CHF or fluid overload  · Procalcitonin normal     Incidental Findings:     Test Results Pending at Discharge (will require follow up): Outpatient Tests Requested:  · None     Complications:  None     Reason for Admission: COPD with acute exacerbation     Hospital Course:   Flori Acevedo is a 69 y o  female patient who originally presented to the hospital on 3/6/2023 due to acute exacerbation of COPD presenting with progressive SOB, weakness, cough and sputum production for 3 days prior to admission  She was admitted for observation  She was given schedualed nebulizer treatments of ipratropium and levabuteriland IV steroids and was on 4LNC O2  Pt had improved with nebulizer and steroid treatment     {Complete this smartphrase if the patient is an inpatient and being discharged earlier than 2 midnights  If this does not apply, please delete this line:[id number]}    Please see above list of diagnoses and related plan for additional information  Condition at Discharge: stable    Discharge Day Visit / Exam:   Subjective:  Pt is seated comfortably in bed and reports that she is feeling much better than yesterday and feels like she can go home today  She has no other complaints at this time    Vitals: Blood Pressure: 163/77 (03/07/23 0807)  Pulse: 91 (03/07/23 1543)  Temperature: 98 4 °F (36 9 °C) (03/07/23 0807)  Temp Source: Oral (03/06/23 1522)  Respirations: 18 (03/07/23 0807)  Height: 5' 7" (170 2 cm) (03/06/23 1522)  Weight - Scale: 44 5 kg (98 lb) (03/06/23 1522)  SpO2: 97 % (03/07/23 0858)  Exam:   Physical Exam  Constitutional:       Appearance: Normal appearance  HENT:      Mouth/Throat:      Mouth: Mucous membranes are moist    Cardiovascular:      Rate and Rhythm: Normal rate and regular rhythm  Pulses: Normal pulses  Pulmonary:      Effort: Pulmonary effort is normal       Breath sounds: Wheezing (localized to the left lung ) present  Skin:     General: Skin is warm and dry  Neurological:      Mental Status: She is alert and oriented to person, place, and time  Psychiatric:         Mood and Affect: Mood normal           Discussion with Family: Patient declined call to   Discharge instructions/Information to patient and family:   See after visit summary for information provided to patient and family  Provisions for Follow-Up Care:  See after visit summary for information related to follow-up care and any pertinent home health orders  Disposition:   Other: Home with [place] [id number]     Planned Readmission:      Discharge Statement:  I spent *** minutes discharging the patient  This time was spent on the day of discharge  I had direct contact with the patient on the day of discharge  Greater than 50% of the total time was spent examining patient, answering all patient questions, arranging and discussing plan of care with patient as well as directly providing post-discharge instructions  Additional time then spent on discharge activities  Discharge Medications:  See after visit summary for reconciled discharge medications provided to patient and/or family        **Please Note: This note may have been constructed using a voice recognition system**

## 2023-03-07 NOTE — NURSING NOTE
Pt complaining of increased mucus production overnight interfering with sleep  Pt states, "Everytime I fall asleep, the mucus collects in my throat and blocks my airway  Then I cough a lot  "Tessalon pearls provided for cough but still not able to sleep  Mucinex already ordered  SLIM notified  Scopolamine 1 mg/72 hr patch ordered and placed behind right ear  Pt instructed to keep the HOB 45 degrees or higher to prevent aspiration

## 2023-03-07 NOTE — OCCUPATIONAL THERAPY NOTE
Occupational Therapy Evaluation        Patient Name: Russell PENA Date: 3/7/2023         03/07/23 0859   OT Last Visit   OT Visit Date 03/07/23   Note Type   Note type Evaluation   Pain Assessment   Pain Assessment Tool 0-10   Pain Score No Pain   Restrictions/Precautions   Weight Bearing Precautions Per Order No   Braces or Orthoses Other (Comment)  (none per patient)   Other Precautions Chair Alarm; Bed Alarm;Multiple lines;O2;Fall Risk   Home Living   Type of [facility] One level; Able to live on main level with bedroom/bathroom; Performs ADLs on one level;Stairs to enter with rails  (4 ADELINA)   Bathroom Shower/Tub Tub/shower unit   Bathroom Toilet Standard   Bathroom Equipment Shower chair;Grab bars in shower;Grab bars around toilet; Toilet raiser   [address]  ([address] [place] [address] / Jessica Cheema)   Additional Comments Pt ambulates without an AD  Prior Function   Level of [place] Independent with functional mobility; Independent with ADLs; Independent with IADLS   Lives With Alone   Receives Help From Family  (son-has been staying with patient)   IADLs Family/Friend/Other provides transportation; Independent with meal prep; Independent with medication management   Falls in the last 6 months 0   Vocational Retired   Lifestyle   Autonomy Patient reports independent with ADLs,  patient ambulates without AD, lives alone in a one story house with 4 ADELINA  Patient's son lives nearby and assists patient      Reciprocal Relationships Supportive family   Service to Others Retired   General   Family/Caregiver Present No   ADL   Where Assessed   (Assist levels for self care tasks,  are based on functional assessment of performance skills and deficits observed during functional mobility assessement)   Eating Assistance 7  Independent   Grooming Assistance 6  [address] 6  [address] 5  Supervision/Setup UB Dressing Assistance 5  Supervision/Setup   LB Dressing Assistance 4  Minimal Assistance   Toileting Assistance  5  Supervision/Setup   Functional Assistance 5  Supervision/Setup   Bed Mobility   Sit to Supine 5  Supervision   Additional items Assist x 1;HOB elevated; Bedrails; Increased time required;Verbal cues   Additional Comments Pt was received seated at EOB in NAD  Transfers   Sit to Stand 5  Supervision   Additional items Assist x 1; Increased time required;Verbal cues   Stand to Sit 5  Supervision   Additional items Assist x 1; Increased time required;Verbal cues   Balance   Static Sitting Good   Dynamic Sitting Fair +   Static Standing Fair   Dynamic Standing Poor +   Activity Tolerance   Activity Tolerance Patient limited by fatigue   Medical Staff Made Aware Co-evaluation performed with PT secondary to complex medical condition of patient and regression of functional status from baseline  PT/OT goals were addressed separately  Nurse Made Aware YASIR Lorenzo   RUE Assessment   RUE Assessment X  (AROM grossly WFL, strength deficit)   RUE Strength   RUE Overall Strength Deficits  (3+/5)   LUE Assessment   LUE Assessment X  (AROM grossly WFL, strength deficit)   LUE Strength   LUE Overall Strength Deficits  (3+/5)   Hand Function   Gross Motor Coordination Functional   Fine Motor Coordination Functional   Sensation   Light Touch No apparent deficits  (BUEs)   Vision-Basic Assessment   Current Vision Wears glasses all the time   Psychosocial   Psychosocial (WDL) WDL   Perception   Inattention/Neglect Appears intact   Cognition   Overall Cognitive Status WFL   Arousal/Participation Alert; Responsive; Cooperative   Attention Within functional limits   Orientation Level Oriented X4   Memory Within functional limits   Following Commands Follows all commands and directions without difficulty   Assessment   Limitation Decreased ADL status; Decreased Safe judgement during ADL;Decreased endurance;Decreased self-care trans;Decreased high-level ADLs   Prognosis Good   Assessment Patient is a 69 y o  female seen for OT evaluation s/p admit to [address] [facility] on 3/6/2023 w/COPD with acute exacerbation ([facility] [address] )  Commorbidities affecting patient's functional performance at time of assessment include: ambulatory dysfunction, essential hypertension, tobacco abuse, chronic respiratory failure with hypoxia, anemia, and Waldenstrom macroglobulinemia  Orders placed for OT evaluation and treatment  Performed at least two patient identifiers during session including name and wristband  Prior to admission,  Patient reports independent with ADLs,  patient ambulates without AD, lives alone in a one story house with 4 ADELINA  Patient's son lives nearby and assists patient   Personal factors affecting patient at time of initial evaluation include: limited caregiver support, steps to enter, limited insight into deficits, difficulty performing ADLs and difficulty performing IADLs  Upon evaluation, patient requires supervision and set up assist for UB ADLs, minimal  assist for LB ADLs, transfers and functional ambulation in room and bathroom with minimal  assist, with the use of Rolling Walker  Occupational performance is affected by the following deficits: decreased functional use of BUEs, decreased muscle strength, degenerative arthritic joint changes, dynamic sit/ stand balance deficit with poor standing tolerance time for self care and functional mobility and decreased activity tolerance  Patient to benefit from continued Occupational Therapy treatment while in the hospital to address deficits as defined above and maximize level of functional independence with ADLs and functional mobility  Occupational Performance areas to address include: bathing/ shower, transfer to all surfaces, functional mobility, emergency response, health maintenance, IADLs: safety procedures and Leisure Participation   From OT standpoint, recommendation at time of d/c would be Home with family support, [address] and Home OT  Plan   Treatment Interventions ADL retraining;Functional transfer training;UE strengthening/ROM; Endurance training;Patient/family training; Compensatory technique education;Continued evaluation; Energy conservation; Activityengagement   Goal Expiration Date 03/21/23   OT Frequency 2-3x/wk   Recommendation   OT Discharge Recommendation Home with home health rehabilitation   AM-PAC Daily Activity Inpatient   Lower Body Dressing 3   Bathing 3   Toileting 3   Upper Body Dressing 3   Grooming 4   Eating 4   Daily Activity Raw Score 20   Daily Activity Standardized Score (Calc for Raw Score >=11) 42 03   AM-PAC Applied Cognition Inpatient   Following a Speech/Presentation 4   Understanding Ordinary Conversation 4   Taking Medications 4   Remembering Where Things Are Placed or Put Away 4   Remembering List of 4-5 Errands 4   Taking Care of Complicated Tasks 4   Applied Cognition Raw Score 24   Applied Cognition Standardized Score 62 21   Barthel Index   Feeding 10   Bathing 0   Grooming Score 5   Dressing Score 5   Bladder Score 10   Bowels Score 10   Toilet Use Score 5   Transfers (Bed/Chair) Score 10   Mobility (Level Surface) Score 0   Stairs Score 5   Barthel Index Score 60         1 - Patient will verbalize and demonstrate use of energy conservation/ deep breathing technique and work simplification skills during functional activity with no verbal cues  2 - Patient will verbalize and demonstrate good body mechanics and joint protection techniques during  ADLs/ IADLs with no verbal cues  3 - Patient will increase OOB/ sitting tolerance to 2-4 hours per day for increased participation in self care and leisure tasks with no s/s of exertion  4 - Patient will increase standing tolerance time to 5  minutes with unilateral UE support to complete sink level ADLs@ mod I level      5 - Patient will increase sitting tolerance at edge of bed to 20 minutes to complete UB ADLs @ set up assist level  6 - Patient will transfer bed to Chair / toilet at Set up assist level with AD as indicated  7 - Patient will complete UB ADLs with set up assist      8 - Patient will complete LB ADLs with min assist with the use of adaptive equipment       9 - Patient will complete toileting hygiene with set up assist/ supervision for thoroughness    10 - Patient/ Family  will demonstrate competency with UE Home Exercise Program

## 2023-03-07 NOTE — PLAN OF CARE
Problem: OCCUPATIONAL THERAPY ADULT  Goal: Performs self-care activities at highest level of function for planned discharge setting  See evaluation for individualized goals  Description: Treatment Interventions: ADL retraining, Functional transfer training, UE strengthening/ROM, Endurance training, Patient/family training, Compensatory technique education, Continued evaluation, Energy conservation, Activityengagement          See flowsheet documentation for full assessment, interventions and recommendations  Note: Limitation: Decreased ADL status, Decreased Safe judgement during ADL, Decreased endurance, Decreased self-care trans, Decreased high-level ADLs  Prognosis: Good  Assessment: Patient is a 69 y o  female seen for OT evaluation s/p admit to [id number] [place] [address] on 3/6/2023 w/COPD with acute exacerbation ([facility] [address] )  Commorbidities affecting patient's functional performance at time of assessment include: ambulatory dysfunction, essential hypertension, tobacco abuse, chronic respiratory failure with hypoxia, anemia, and Waldenstrom macroglobulinemia  Orders placed for OT evaluation and treatment  Performed at least two patient identifiers during session including name and wristband  Prior to admission,  Patient reports independent with ADLs,  patient ambulates without AD, lives alone in a one story house with 4 ADELINA  Patient's son lives nearby and assists patient   Personal factors affecting patient at time of initial evaluation include: limited caregiver support, steps to enter, limited insight into deficits, difficulty performing ADLs and difficulty performing IADLs  Upon evaluation, patient requires supervision and set up assist for UB ADLs, minimal  assist for LB ADLs, transfers and functional ambulation in room and bathroom with minimal  assist, with the use of Rolling Walker    Occupational performance is affected by the following deficits: decreased functional use of BUEs, decreased muscle strength, degenerative arthritic joint changes, dynamic sit/ stand balance deficit with poor standing tolerance time for self care and functional mobility and decreased activity tolerance  Patient to benefit from continued Occupational Therapy treatment while in the hospital to address deficits as defined above and maximize level of functional independence with ADLs and functional mobility  Occupational Performance areas to address include: bathing/ shower, transfer to all surfaces, functional mobility, emergency response, health maintenance, IADLs: safety procedures and Leisure Participation  From OT standpoint, recommendation at time of d/c would be Home with family support, [address] and Home OT       OT Discharge Recommendation: Home with home health rehabilitation

## 2023-03-07 NOTE — ASSESSMENT & PLAN NOTE
· Presents with progressive worsening shortness of breath and sputum production    Denies fevers or chills  · [facility] [address]  · COVID/flu/RSV negative; notable that she was (+) for Flu A 12/2022  · Monitor off abx, procalcitonin normal  · Continue nebs, PO steroids, home O2 supplement on discharge  · Stable currently at baseline chronic O2 needs 4LNC

## 2023-03-07 NOTE — MALNUTRITION/BMI
This medical record reflects one or more clinical indicators suggestive of Underweight      BMI Findings:  Adult BMI Classifications: Underweight < 18 5        Body mass index is 15 35 kg/m²  See Nutrition note dated 3/7/23 for additional details  Completed nutrition assessment is viewable in the nutrition documentation

## 2023-03-07 NOTE — DISCHARGE SUMMARY
[address]  Discharge- Mckenna Appl 1945, 69 y o  female MRN: [id number]  Unit/Bed#: -01 Encounter: [id number]  Primary Care Provider: Shaun Alex MD   Date and time admitted to hospital: 3/6/2023  9:30 AM    * COPD with acute exacerbation [facility])  Assessment & Plan  · Presents with progressive worsening shortness of breath and sputum production  Denies fevers or chills  · Last hospitalization for COPD back in January 2023 at [facility]  · COVID/flu/RSV negative  · Monitor off abx, check procalcitonin  · Consider azithromycin for anti-inflammatory benefits  · Scheduled nebs, IV steroids  · Respiratory protocol, IS  · Stable currently at baseline chronic O2 needs 4LNC    Chronic respiratory failure with hypoxia (HCC)  Assessment & Plan  · Stable on chronic O2 needs at 4 LNC  · Respiratory protocol  · Treatment of COPD exacerbation as above    Tobacco abuse  Assessment & Plan  · Smokes a pack per day  · Counseled regarding cessation for 5 minutes  · Nicotine patch    Waldenstrom macroglobulinemia (HCC)  Assessment & Plan  · Stable  Continue outpatient follow up with Oncology    Essential hypertension  Assessment & Plan  · Continue home amlodipine 10 mg daily, enalapril 20 mg daily (per formulary will switch to lisinopril)  · BP stable, continue to monitor    Anemia  Assessment & Plan  · Suspect in the setting of underlying malignancy, no reports of bleeding   Follows with Oncology outpatient  · Check iron panel  · Monitor CBC    Ambulatory dysfunction  Assessment & Plan  · Reports ongoing weakness for the past 2 to 3 days  · PT/OT      Medical Problems     Resolved Problems  Date Reviewed: 3/7/2023   None       Discharging Physician / Practitioner: Jonathan Booker PA-C  PCP: Shaun Alex MD  Admission Date:   Admission Orders (From admission, onward)     Ordered        03/06/23 1417  Place in Observation  Once                      Discharge Date: 03/07/23    Consultations During Hospital Stay:  · IP CONSULT TO CASE MANAGEMENT   · PT/OT     Procedures Performed:   · CXR    Significant Findings / Test Results:   · CXR with possible vascular congestion  · BNP <200  · COVID/FLU/RSV negative     Incidental Findings:     Test Results Pending at Discharge (will require follow up): Outpatient Tests Requested:  · None     Complications:  None     Reason for Admission: COPD exacerbation     Hospital Course:   Jordan Solomon is a 69 y o  female patient who originally presented to the hospital on 3/6/2023 due to shortness of breath and cough  Has been treated at least 3 times in the last several month for various viral and bacterial illnesses  Was doing better  Reports her son is presently staying with her until she is better  At baseline, she is on 4L via NC  Treated for acute COPD, CXR showing ? Vascular congestion, but BNP low, thus low suspicion for acute CHF  Patient hemodynamically stable and feeling better, recommend discharge to home with outpatient pulmonology follow up  Please see above list of diagnoses and related plan for additional information  Condition at Discharge: good    Discharge Day Visit / Exam:   Subjective:  Patient seen this am, anxious to head home  Reports she is feeling better, no hemoptysis  Reports shortness of breath is better  Denies chest pain  Vitals: Blood Pressure: 163/77 (03/07/23 0807)  Pulse: 91 (03/07/23 0807)  Temperature: 98 4 °F (36 9 °C) (03/07/23 0807)  Temp Source: Oral (03/06/23 1522)  Respirations: 18 (03/07/23 0807)  Height: 5' 7" (170 2 cm) (03/06/23 1522)  Weight - Scale: 44 5 kg (98 lb) (03/06/23 1522)  SpO2: 97 % (03/07/23 0858)  Exam:   Physical Exam  Vitals and nursing note reviewed  Constitutional:       General: She is not in acute distress  Appearance: She is ill-appearing (chronically)  She is not toxic-appearing  Cardiovascular:      Rate and Rhythm: Normal rate and regular rhythm     Pulmonary: Effort: Pulmonary effort is normal       Breath sounds: Examination of the right-middle field reveals rhonchi  Examination of the right-lower field reveals rhonchi  Decreased breath sounds and rhonchi present  Neurological:      Mental Status: She is alert and oriented to person, place, and time  Psychiatric:         Mood and Affect: Mood normal            Discussion with Family: Updated  (son) via phone  Discharge instructions/Information to patient and family:   See after visit summary for information provided to patient and family  Provisions for Follow-Up Care:  See after visit summary for information related to follow-up care and any pertinent home health orders  Disposition:   Home with VNA Services (Reminder: Complete face to face encounter)    Planned Readmission: no     Discharge Statement:  I spent >50 minutes discharging the patient  This time was spent on the day of discharge  I had direct contact with the patient on the day of discharge  Greater than 50% of the total time was spent examining patient, answering all patient questions, arranging and discussing plan of care with patient as well as directly providing post-discharge instructions  Additional time then spent on discharge activities  Discharge Medications:  See after visit summary for reconciled discharge medications provided to patient and/or family        **Please Note: This note may have been constructed using a voice recognition system**

## 2023-03-07 NOTE — PHYSICAL THERAPY NOTE
Physical Therapy Evaluation     Patient's Name: Petey Shields    Admitting Diagnosis  Weakness [R53 1]  COPD exacerbation ([facility] ) [J44 1]  Generalized weakness [R53 1]    Problem List  Patient Active Problem List   Diagnosis    Ambulatory dysfunction    Hypokalemia    Essential hypertension    Tobacco abuse    Chronic respiratory failure with hypoxia (HCC)    CAD (coronary artery disease), native coronary artery    Flank pain    Pneumonia    Sepsis ([facility] )    Major depressive disorder, recurrent episode, moderate (HCC)    Fatigue    COPD with acute exacerbation (HCC)    SIRS (systemic inflammatory response syndrome) (HCC)    Anemia    Abnormal computed tomography angiography (CTA)    Severe protein-calorie malnutrition (HCC)    Positive blood culture    Waldenstrom macroglobulinemia (HCC)     Past Medical History  Past Medical History:   Diagnosis Date    Acute congestive heart failure ([place] [address] ) 8/27/2021    Anxiety     Cardiac disease     Chest pain 8/27/2021    Chronic pain disorder     COPD (chronic obstructive pulmonary disease) (HCC)     Depression     Heart disease     Hyperlipidemia     Hypertension     MI (myocardial infarction) ([place] [address] )     MRSA (methicillin resistant Staphylococcus aureus)     Renal disorder     benign kidney tumor     Past Surgical History  Past Surgical History:   Procedure Laterality Date    APPENDECTOMY      ELBOW BURSA SURGERY Left 02/2018    D/T MRSA INFECTION    SPINAL FUSION      L7 L9      03/07/23 0841   PT Last Visit   PT Visit Date 03/07/23   Note Type   Note type Evaluation   Pain Assessment   Pain Assessment Tool 0-10   Pain Score No Pain   Restrictions/Precautions   Weight Bearing Precautions Per Order No   Braces or Orthoses Other (Comment)  (none per patient)   Other Precautions Chair Alarm; Bed Alarm;Multiple lines;O2;Fall Risk  (+4L O2 via NC)   Home Living   Type of [address] [place] [address] One level; Able to live on main level with bedroom/bathroom; Performs ADLs on one level;Stairs to enter with rails  (4 ADELINA)   Bathroom Shower/Tub Tub/shower unit   Bathroom Toilet Standard   Bathroom Equipment Shower chair;Grab bars in shower;Grab bars around toilet; Toilet raiser   [address] Walker  (RW and rollator)   Additional Comments Pt ambulates without an AD  Prior Function   Level of [place] Independent with functional mobility; Independent with ADLs; Independent with IADLS   Lives With Alone   Receives Help From Family  (son-has been staying with patient)   IADLs Family/Friend/Other provides transportation; Independent with meal prep; Independent with medication management   Falls in the last 6 months 0   Vocational Retired   General   Family/Caregiver Present No   Cognition   Overall Cognitive Status WFL   Arousal/Participation Alert   Orientation Level Oriented X4   Memory Within functional limits   Following Commands Follows all commands and directions without difficulty   Comments Pt agreeable to PT  Subjective   Subjective "My knees are weak "   RUE Assessment   RUE Assessment   (defer to OT assessment)   LUE Assessment   LUE Assessment   (defer to OT assessment)   RLE Assessment   RLE Assessment X   Strength RLE   RLE Overall Strength 4-/5   LLE Assessment   LLE Assessment X   Strength LLE   LLE Overall Strength 4-/5   Light Touch   RLE Light Touch Grossly intact   LLE Light Touch Grossly intact   Bed Mobility   Sit to Supine 5  Supervision   Additional items Assist x 1;HOB elevated; Bedrails; Increased time required;Verbal cues   Additional Comments Pt was received seated at EOB in NAD  Transfers   Sit to Stand 5  Supervision   Additional items Assist x 1; Increased time required;Verbal cues   Stand to Sit 5  Supervision   Additional items Assist x 1; Increased time required;Verbal cues   Ambulation/Elevation   Gait pattern Decreased toe off;Decreased heel strike;Decreased hip extension; Short stride; Shuffling   Gait Assistance   (CG assist)   Additional items Assist x 1;Verbal cues   Assistive Device Rolling walker   Distance 40 feet   Stair Management Assistance 4  Minimal assist   Additional items Assist x 1;Verbal cues   Stair Management Technique One rail R;Alternating pattern; Foreward   Number of Stairs 4  (4 inch)   Balance   Static Sitting Good   Dynamic Sitting Fair +   Static Standing Fair   Dynamic Standing Fair -   Ambulatory Fair -   Endurance Deficit   Endurance Deficit Yes   Endurance Deficit Description decreased activity tolerance; dyspnea on exertion; pt was received on 4L O2 via NC   Activity Tolerance   Activity Tolerance Patient limited by fatigue   Medical Staff Made Aware OT Nasim Zapata; Discussed case wtih MIRIAN Lang  (Co-evaluation performed with OT secondary to complex medical condition of patient and regression of functional status from baseline  PT/OT goals were addressed separately )   Nurse Made Aware RN Juanita   Assessment   Prognosis Good   Problem List Decreased strength;Decreased endurance; Impaired balance;Decreased mobility   Assessment Pt is 69year old female seen for PT evaluation s/p admit to [facility] on 3/6/2023 with COPD with acute exacerbation ([facility] )  PT consulted to assess pt's functional mobility and discharge needs  Order placed for PT evaluation and treatment  Comorbidities affecting pt's physical performance at time of assessment include ambulatory dysfunction, essential hypertension, tobacco abuse, chronic respiratory failure with hypoxia, anemia, and Waldenstrom macroglobulinemia  Prior to hospitalization, pt was independent with all functional mobility without an AD  Pt ambulates household and community distances  Pt resides alone, in a one level house, with 4 steps to enter   Personal factors affecting pt at time of initial evaluation include stairs to enter home, ambulating with an assistive device, inability to ambulate community distances, inability to navigate level surfaces without external assistance, limited home support, and difficulty performing ADLs and IADLs  Please find objective findings from PT assessment regarding body systems outlined above with impairments and limitations including weakness, impaired balance, decreased endurance, gait deviations, decreased activity tolerance, decreased functional mobility tolerance, and fall risk  The following objective measures were performed on initial evaluation Barthel Index: 60/100, Modified Dennis: 4 (moderate/severe disability) and AM-PAC 6-Clicks: 90/54  Pt's clinical presentation is currently evolving seen in pt's presentation of need for ongoing medical management/monitoring, pt is a fall risk, pt currently requires use of RW for safe ambulation, and pt requires cues and assist for safety with functional mobility  Pt to benefit from continued PT treatment to address deficits as defined above and maximize pt's level of function and independence with mobility  From a PT standpoint, recommendation at time of discharge would be Home PT with family support pending pt's progress in order to facilitate return to prior level of function  Barriers to Discharge None   Goals   STG Expiration Date 03/17/23   Short Term Goal #1 In 10 days: Increase bilateral LE strength 1/2 grade to facilitate independent mobility, Perform all bed mobility tasks modified independent to decrease caregiver burden, Perform all transfers modified independent to improve independence, Ambulate > 150 ft  with least restrictive assistive device modified independent w/o LOB and w/ normalized gait pattern 100% of the time, Navigate 4 stairs modified independent with unilateral handrail to facilitate return to previous living environment and Increase all balance 1/2 grade to decrease risk for falls   Plan   Treatment/Interventions Functional transfer training;LE strengthening/ROM; Elevations; Therapeutic exercise; Endurance training;Patient/family training;Bed mobility;Gait training;Spoke to nursing;Spoke to advanced practitioner;OT   PT Frequency 2-3x/wk   Recommendation   PT Discharge Recommendation Home with home health rehabilitation   Equipment Recommended [facility] Recommended Wheeled walker   Change/add to [employer]?  No   AM-PAC Basic Mobility Inpatient   Turning in Flat Bed Without Bedrails 3   Lying on Back to Sitting on Edge of Flat Bed Without Bedrails 3   Moving Bed to Chair 3   Standing Up From Chair Using Arms 3   Walk in Room 3   Climb 3-5 Stairs With Railing 3   Basic Mobility Inpatient Raw Score 18   Basic Mobility Standardized Score 41 05   Highest Level Of Mobility   JH-HLM Goal 6: Walk 10 steps or more   JH-HLM Achieved 7: Walk 25 feet or more   Modified Lemoyne Scale   Modified Lemoyne Scale 4   Barthel Index   Feeding 10   Bathing 0   Grooming Score 5   Dressing Score 5   Bladder Score 10   Bowels Score 10   Toilet Use Score 5   Transfers (Bed/Chair) Score 10   Mobility (Level Surface) Score 0   Stairs Score 5   Barthel Index Score 60     PT Evaluation Time: 1686-7066  Jaelyn Smith, PT, DPT

## 2023-03-07 NOTE — PLAN OF CARE
Problem: DISCHARGE PLANNING  Goal: Discharge to home or other facility with appropriate resources  Description: INTERVENTIONS:  - Identify barriers to discharge w/patient and caregiver  - Arrange for needed discharge resources and transportation as appropriate  - Identify discharge learning needs (meds, wound care, etc )  - Arrange for interpretive services to assist at discharge as needed  - Refer to Case Management Department for coordinating discharge planning if the patient needs post-hospital services based on physician/advanced practitioner order or complex needs related to functional status, cognitive ability, or social support system  Outcome: Progressing     Problem: Knowledge Deficit  Goal: Patient/family/caregiver demonstrates understanding of disease process, treatment plan, medications, and discharge instructions  Description: Complete learning assessment and assess knowledge base    Interventions:  - Provide teaching at level of understanding  - Provide teaching via preferred learning methods  Outcome: Progressing     Problem: CARDIOVASCULAR - ADULT  Goal: Maintains optimal cardiac output and hemodynamic stability  Description: INTERVENTIONS:  - Monitor I/O, vital signs and rhythm  - Monitor for S/S and trends of decreased cardiac output  - Administer and titrate ordered vasoactive medications to optimize hemodynamic stability  - Assess quality of pulses, skin color and temperature  - Assess for signs of decreased coronary artery perfusion  - Instruct patient to report change in severity of symptoms  Outcome: Progressing  Goal: Absence of cardiac dysrhythmias or at baseline rhythm  Description: INTERVENTIONS:  - Continuous cardiac monitoring, vital signs, obtain 12 lead EKG if ordered  - Administer antiarrhythmic and heart rate control medications as ordered  - Monitor electrolytes and administer replacement therapy as ordered  Outcome: Progressing     Problem: RESPIRATORY - ADULT  Goal: Achieves optimal ventilation and oxygenation  Description: INTERVENTIONS:  - Assess for changes in respiratory status  - Assess for changes in mentation and behavior  - Position to facilitate oxygenation and minimize respiratory effort  - Oxygen administered by appropriate delivery if ordered  - Initiate smoking cessation education as indicated  - Encourage broncho-pulmonary hygiene including cough, deep breathe, Incentive Spirometry  - Assess the need for suctioning and aspirate as needed  - Assess and instruct to report SOB or any respiratory difficulty  - Respiratory Therapy support as indicated  Outcome: Progressing     Problem: MUSCULOSKELETAL - ADULT  Goal: Maintain or return mobility to safest level of function  Description: INTERVENTIONS:  - Assess patient's ability to carry out ADLs; assess patient's baseline for ADL function and identify physical deficits which impact ability to perform ADLs (bathing, care of mouth/teeth, toileting, grooming, dressing, etc )  - Assess/evaluate cause of self-care deficits   - Assess range of motion  - Assess patient's mobility  - Assess patient's need for assistive devices and provide as appropriate  - Encourage maximum independence but intervene and supervise when necessary  - Involve family in performance of ADLs  - Assess for home care needs following discharge   - Consider OT consult to assist with ADL evaluation and planning for discharge  - Provide patient education as appropriate  Outcome: Progressing  Goal: Maintain proper alignment of affected body part  Description: INTERVENTIONS:  - Support, maintain and protect limb and body alignment  - Provide patient/ family with appropriate education  Outcome: Progressing     Problem: Nutrition/Hydration-ADULT  Goal: Nutrient/Hydration intake appropriate for improving, restoring or maintaining nutritional needs  Description: Monitor and assess patient's nutrition/hydration status for malnutrition   Collaborate with interdisciplinary team and initiate plan and interventions as ordered  Monitor patient's weight and dietary intake as ordered or per policy  Utilize nutrition screening tool and intervene as necessary  Determine patient's food preferences and provide high-protein, high-caloric foods as appropriate       INTERVENTIONS:  - Monitor oral intake, urinary output, labs, and treatment plans  - Assess nutrition and hydration status and recommend course of action  - Evaluate amount of meals eaten  - Assist patient with eating if necessary   - Allow adequate time for meals  - Recommend/ encourage appropriate diets, oral nutritional supplements, and vitamin/mineral supplements  - Order, calculate, and assess calorie counts as needed  - Recommend, monitor, and adjust tube feedings and TPN/PPN based on assessed needs  - Assess need for intravenous fluids  - Provide specific nutrition/hydration education as appropriate  - Include patient/family/caregiver in decisions related to nutrition  Outcome: Progressing

## 2023-03-07 NOTE — CASE MANAGEMENT
Case Management Assessment & Discharge Planning Note    Patient name Kya Carrillo  Location [address] [id number] MRN [id number]  : 1945 Date 3/7/2023       Current Admission Date: 3/6/2023  Current Admission Diagnosis:COPD with acute exacerbation [facility])   Patient Active Problem List    Diagnosis Date Noted   • Waldenstrom macroglobulinemia ([address] ) 2023   • Severe protein-calorie malnutrition ([address] ) 2021   • Positive blood culture 2021   • COPD with acute exacerbation ([address] ) 2021   • SIRS (systemic inflammatory response syndrome) ([address] ) 2021   • Anemia 2021   • Abnormal computed tomography angiography (CTA) 2021   • Fatigue 2021   • Major depressive disorder, recurrent episode, moderate ([address] ) 2019   • Flank pain 2019   • Pneumonia 2019   • Sepsis ([address] ) 2019   • CAD (coronary artery disease), native coronary artery 2018   • Chronic respiratory failure with hypoxia ([address] ) 2018   • Ambulatory dysfunction 2018   • Hypokalemia 2018   • Essential hypertension 2018   • Tobacco abuse 2018      LOS (days): 0  Geometric Mean LOS (GMLOS) (days):   Days to GMLOS:     OBJECTIVE:              Current admission status: Observation       Preferred Pharm[facility]:   [facility] [facility] [address]  Phone: [phone] Fax: [phone]    Primary Care Provider: Nohemy Flaherty MD    Primary Insurance: MEDICARE  Secondary Insurance: Myrna Butcher 118:  [address], [address] [place] [address] - [facility]   Primary Phone: [phone] (Home)                  CM was consulted for ISAR  CM attempted to meet with patient at bedside  CM explained CM role for discharge planning  CM attempted to speak with patient to complete assessment as well as planning for a safe discharge   Patient refused to speak with CM and denied needing assistance with discharge planning  Patient stated that they have VNA services already  CM asked if patient needed CM to inform VNA on discharge date, patient said no they did not want any problems caused

## 2023-03-07 NOTE — ASSESSMENT & PLAN NOTE
· Suspect in the setting of underlying malignancy, no reports of bleeding   Follows with Oncology outpatient  · Check iron panel - pending  · Give IV venofer x 100 mg now  · Monitor CBC

## 2023-03-07 NOTE — PLAN OF CARE
Problem: Potential for Falls  Goal: Patient will remain free of falls  Description: INTERVENTIONS:  - Educate patient/family on patient safety including physical limitations  - Instruct patient to call for assistance with activity   - Consult OT/PT to assist with strengthening/mobility   - Keep Call bell within reach  - Keep bed low and locked with side rails adjusted as appropriate  - Keep care items and personal belongings within reach  - Initiate and maintain comfort rounds  - Make Fall Risk Sign visible to staff  - Offer Toileting every 2 Hours, in advance of need  - Initiate/Maintain alarm  - Obtain necessary fall risk management equipment  - Apply yellow socks and bracelet for high fall risk patients  - Consider moving patient to room near nurses station  Outcome: Progressing     Problem: MOBILITY - ADULT  Goal: Maintain or return to baseline ADL function  Description: INTERVENTIONS:  -  Assess patient's ability to carry out ADLs; assess patient's baseline for ADL function and identify physical deficits which impact ability to perform ADLs (bathing, care of mouth/teeth, toileting, grooming, dressing, etc )  - Assess/evaluate cause of self-care deficits   - Assess range of motion  - Assess patient's mobility; develop plan if impaired  - Assess patient's need for assistive devices and provide as appropriate  - Encourage maximum independence but intervene and supervise when necessary  - Involve family in performance of ADLs  - Assess for home care needs following discharge   - Consider OT consult to assist with ADL evaluation and planning for discharge  - Provide patient education as appropriate  Outcome: Progressing  Goal: Maintains/Returns to pre admission functional level  Description: INTERVENTIONS:  - Perform BMAT or MOVE assessment daily    - Set and communicate daily mobility goal to care team and patient/family/caregiver     - Collaborate with rehabilitation services on mobility goals if consulted  - Perform Range of Motion 2 times a day  - Reposition patient every 2 hours    - Dangle patient  - Stand patient   - Ambulate patient   - Out of bed to chair    - Out of bed for meals   - Out of bed for toileting  - Record patient progress and toleration of activity level   Outcome: Progressing     Problem: SAFETY ADULT  Goal: Patient will remain free of falls  Description: INTERVENTIONS:  - Educate patient/family on patient safety including physical limitations  - Instruct patient to call for assistance with activity   - Consult OT/PT to assist with strengthening/mobility   - Keep Call bell within reach  - Keep bed low and locked with side rails adjusted as appropriate  - Keep care items and personal belongings within reach  - Initiate and maintain comfort rounds  - Make Fall Risk Sign visible to staff  - Offer Toileting every 2 Hours, in advance of need  - Initiate/Maintain alarm  - Obtain necessary fall risk management equipment  - Apply yellow socks and bracelet for high fall risk patients  - Consider moving patient to room near nurses station  Outcome: Progressing  Goal: Maintain or return to baseline ADL function  Description: INTERVENTIONS:  -  Assess patient's ability to carry out ADLs; assess patient's baseline for ADL function and identify physical deficits which impact ability to perform ADLs (bathing, care of mouth/teeth, toileting, grooming, dressing, etc )  - Assess/evaluate cause of self-care deficits   - Assess range of motion  - Assess patient's mobility; develop plan if impaired  - Assess patient's need for assistive devices and provide as appropriate  - Encourage maximum independence but intervene and supervise when necessary  - Involve family in performance of ADLs  - Assess for home care needs following discharge   - Consider OT consult to assist with ADL evaluation and planning for discharge  - Provide patient education as appropriate  Outcome: Progressing  Goal: Maintains/Returns to pre admission functional level  Description: INTERVENTIONS:  - Perform BMAT or MOVE assessment daily    - Set and communicate daily mobility goal to care team and patient/family/caregiver  - Collaborate with rehabilitation services on mobility goals if consulted  - Perform Range of Motion 2 times a day  - Reposition patient every 2 hours    - Dangle patient  - Stand patient   - Ambulate patient   - Out of bed to chair    - Out of bed for meals   - Out of bed for toileting  - Record patient progress and toleration of activity level   Outcome: Progressing

## 2023-03-07 NOTE — ASSESSMENT & PLAN NOTE
· Stable   Continue outpatient follow up with Oncology  · Given anemia, will give IV venofer today as she is due for this outpatient

## 2023-03-07 NOTE — RESPIRATORY THERAPY NOTE
RT Protocol Note  Karen Juarez 69 y o  female MRN: [id number]  Unit/Bed#: -01 Encounter: [id number]    Assessment    Principal Problem:    COPD with acute exacerbation ([facility] )  Active Problems:    Ambulatory dysfunction    Essential hypertension    Tobacco abuse    Chronic respiratory failure with hypoxia (HCC)    Anemia    Waldenstrom macroglobulinemia (HCC)      Home Pulmonary Medications:  TID nebs, inhalers    Home Devices/Therapy: Home O2 (4L)    Past Medical History:   Diagnosis Date   • Acute congestive heart failure ([facility] ) 8/27/2021   • Anxiety    • Cardiac disease    • Chest pain 8/27/2021   • Chronic pain disorder    • COPD (chronic obstructive pulmonary disease) ([facility])    • Depression    • Heart disease    • Hyperlipidemia    • Hypertension    • MI (myocardial infarction) ([facility])    • MRSA (methicillin resistant Staphylococcus aureus)    • Renal disorder     benign kidney tumor     Social History     Socioeconomic History   • Marital status:      Spouse name: None   • Number of children: 3   • Years of education: 15   • Highest education level: High school graduate   Occupational History   • Occupation: [employer]      Employer: [employer]     Comment: retired    Tobacco Use   • Smoking status: Every Day     Packs/day: 1 00     Years: 60 00     Pack years: 60 00     Types: Cigarettes   • Smokeless tobacco: Never   • Tobacco comments:     She has close to a 60 pack-year smoking history, most recently has cut down to 4-5 cigarettes daily   Vaping Use   • Vaping Use: Never used   Substance and Sexual Activity   • Alcohol use: Never   • Drug use: No   • Sexual activity: Not Currently   Other Topics Concern   • None   Social History Narrative   • None     Social Determinants of Health     Financial Resource Strain: Not on file   Food Insecurity: Not on file   Transportation Needs: Not on file   Physical Activity: Not on file   Stress: Not on file   Social Connections: Not on file Intimate Partner Violence: Not on file   Housing Stability: Not on file       Subjective         Objective    Physical Exam:   General Appearance: Awake, Alert  Respiratory Pattern: Normal  Chest Assessment: Chest expansion symmetrical  Bilateral Breath Sounds: Crackles, Scattered  Cough: None  O2 Device: 4L nasal cannula    Vitals:  Blood pressure 123/72, pulse 94, temperature 98 2 °F (36 8 °C), temperature source Oral, resp  rate 18, height 5' 7" (1 702 m), weight 44 5 kg (98 lb), SpO2 96 %, not currently breastfeeding            Imaging and other studies:     O2 Device: 4L nasal cannula     Plan    Respiratory Plan: Mild Distress pathway        Resp Comments: resting quietly with even non labored respirations, hx of copd with home medication use, o2 dependent

## 2023-03-07 NOTE — PLAN OF CARE
Problem: PHYSICAL THERAPY ADULT  Goal: Performs mobility at highest level of function for planned discharge setting  See evaluation for individualized goals  Description: Treatment/Interventions: Functional transfer training, LE strengthening/ROM, Elevations, Therapeutic exercise, Endurance training, Patient/family training, Bed mobility, Gait training, Spoke to nursing, Spoke to advanced practitioner, OT  Equipment Recommended: Crystal Sotomayor       See flowsheet documentation for full assessment, interventions and recommendations  Note: Prognosis: Good  Problem List: Decreased strength, Decreased endurance, Impaired balance, Decreased mobility  Assessment: Pt is 69year old female seen for PT evaluation s/p admit to [facility] on 3/6/2023 with COPD with acute exacerbation ([facility] [address] )  PT consulted to assess pt's functional mobility and discharge needs  Order placed for PT evaluation and treatment  Comorbidities affecting pt's physical performance at time of assessment include ambulatory dysfunction, essential hypertension, tobacco abuse, chronic respiratory failure with hypoxia, anemia, and Waldenstrom macroglobulinemia  Prior to hospitalization, pt was independent with all functional mobility without an AD  Pt ambulates household and community distances  Pt resides alone, in a one level house, with 4 steps to enter  Personal factors affecting pt at time of initial evaluation include stairs to enter home, ambulating with an assistive device, inability to ambulate community distances, inability to navigate level surfaces without external assistance, limited home support, and difficulty performing ADLs and IADLs  Please find objective findings from PT assessment regarding body systems outlined above with impairments and limitations including weakness, impaired balance, decreased endurance, gait deviations, decreased activity tolerance, decreased functional mobility tolerance, and fall risk   The following objective measures were performed on initial evaluation Barthel Index: 60/100, Modified Cameron: 4 (moderate/severe disability) and AM-PAC 6-Clicks: 35/80  Pt's clinical presentation is currently evolving seen in pt's presentation of need for ongoing medical management/monitoring, pt is a fall risk, pt currently requires use of RW for safe ambulation, and pt requires cues and assist for safety with functional mobility  Pt to benefit from continued PT treatment to address deficits as defined above and maximize pt's level of function and independence with mobility  From a PT standpoint, recommendation at time of discharge would be Home PT with family support pending pt's progress in order to facilitate return to prior level of function  Barriers to Discharge: None     PT Discharge Recommendation: Home with home health rehabilitation    See flowsheet documentation for full assessment

## 2023-03-09 ENCOUNTER — APPOINTMENT (OUTPATIENT)
Dept: LAB | Facility: HOSPITAL | Age: 78
End: 2023-03-09

## 2023-03-09 DIAGNOSIS — D64.9 ANEMIA, UNSPECIFIED TYPE: ICD-10-CM

## 2023-03-09 LAB
ERYTHROCYTE [DISTWIDTH] IN BLOOD BY AUTOMATED COUNT: 13.7 % (ref 11.6–15.1)
HCT VFR BLD AUTO: 27.9 % (ref 34.8–46.1)
HGB BLD-MCNC: 8.4 G/DL (ref 11.5–15.4)
MCH RBC QN AUTO: 30.7 PG (ref 26.8–34.3)
MCHC RBC AUTO-ENTMCNC: 30.1 G/DL (ref 31.4–37.4)
MCV RBC AUTO: 102 FL (ref 82–98)
PLATELET # BLD AUTO: 224 THOUSANDS/UL (ref 149–390)
PMV BLD AUTO: 9.9 FL (ref 8.9–12.7)
RBC # BLD AUTO: 2.74 MILLION/UL (ref 3.81–5.12)
WBC # BLD AUTO: 11.71 THOUSAND/UL (ref 4.31–10.16)

## 2023-03-10 ENCOUNTER — HOSPITAL ENCOUNTER (INPATIENT)
Facility: HOSPITAL | Age: 78
LOS: 2 days | Discharge: HOME HEALTH | End: 2023-03-13
Attending: EMERGENCY MEDICINE | Admitting: FAMILY MEDICINE

## 2023-03-10 ENCOUNTER — APPOINTMENT (EMERGENCY)
Dept: RADIOLOGY | Facility: HOSPITAL | Age: 78
End: 2023-03-10

## 2023-03-10 DIAGNOSIS — J44.1 COPD EXACERBATION (HCC): Primary | ICD-10-CM

## 2023-03-10 DIAGNOSIS — F33.1 MAJOR DEPRESSIVE DISORDER, RECURRENT EPISODE, MODERATE (HCC): ICD-10-CM

## 2023-03-10 LAB
ALBUMIN SERPL BCP-MCNC: 3.8 G/DL (ref 3.5–5)
ALP SERPL-CCNC: 69 U/L (ref 34–104)
ALT SERPL W P-5'-P-CCNC: 4 U/L (ref 7–52)
ANION GAP SERPL CALCULATED.3IONS-SCNC: 5 MMOL/L (ref 4–13)
AST SERPL W P-5'-P-CCNC: 9 U/L (ref 13–39)
BASOPHILS # BLD MANUAL: 0 THOUSAND/UL (ref 0–0.1)
BASOPHILS NFR MAR MANUAL: 0 % (ref 0–1)
BILIRUB SERPL-MCNC: 0.3 MG/DL (ref 0.2–1)
BUN SERPL-MCNC: 18 MG/DL (ref 5–25)
CALCIUM SERPL-MCNC: 9.4 MG/DL (ref 8.4–10.2)
CARDIAC TROPONIN I PNL SERPL HS: 7 NG/L
CHLORIDE SERPL-SCNC: 100 MMOL/L (ref 96–108)
CO2 SERPL-SCNC: 36 MMOL/L (ref 21–32)
CREAT SERPL-MCNC: 0.72 MG/DL (ref 0.6–1.3)
D DIMER PPP FEU-MCNC: 0.37 UG/ML FEU
EOSINOPHIL # BLD MANUAL: 0.36 THOUSAND/UL (ref 0–0.4)
EOSINOPHIL NFR BLD MANUAL: 2 % (ref 0–6)
ERYTHROCYTE [DISTWIDTH] IN BLOOD BY AUTOMATED COUNT: 14 % (ref 11.6–15.1)
FLUAV RNA RESP QL NAA+PROBE: NEGATIVE
FLUBV RNA RESP QL NAA+PROBE: NEGATIVE
GFR SERPL CREATININE-BSD FRML MDRD: 81 ML/MIN/1.73SQ M
GLUCOSE SERPL-MCNC: 104 MG/DL (ref 65–140)
HCT VFR BLD AUTO: 29.3 % (ref 34.8–46.1)
HGB BLD-MCNC: 8.9 G/DL (ref 11.5–15.4)
LYMPHOCYTES # BLD AUTO: 40 % (ref 14–44)
LYMPHOCYTES # BLD AUTO: 7.17 THOUSAND/UL (ref 0.6–4.47)
MCH RBC QN AUTO: 31 PG (ref 26.8–34.3)
MCHC RBC AUTO-ENTMCNC: 30.4 G/DL (ref 31.4–37.4)
MCV RBC AUTO: 102 FL (ref 82–98)
MONOCYTES # BLD AUTO: 1.08 THOUSAND/UL (ref 0–1.22)
MONOCYTES NFR BLD: 6 % (ref 4–12)
NEUTROPHILS # BLD MANUAL: 9.32 THOUSAND/UL (ref 1.85–7.62)
NEUTS SEG NFR BLD AUTO: 52 % (ref 43–75)
PLATELET # BLD AUTO: 267 THOUSANDS/UL (ref 149–390)
PLATELET BLD QL SMEAR: ADEQUATE
PMV BLD AUTO: 9.8 FL (ref 8.9–12.7)
POTASSIUM SERPL-SCNC: 3.9 MMOL/L (ref 3.5–5.3)
PROT SERPL-MCNC: 6.9 G/DL (ref 6.4–8.4)
RBC # BLD AUTO: 2.87 MILLION/UL (ref 3.81–5.12)
RBC MORPH BLD: PRESENT
RSV RNA RESP QL NAA+PROBE: NEGATIVE
SARS-COV-2 RNA RESP QL NAA+PROBE: NEGATIVE
SODIUM SERPL-SCNC: 141 MMOL/L (ref 135–147)
WBC # BLD AUTO: 17.93 THOUSAND/UL (ref 4.31–10.16)

## 2023-03-10 RX ORDER — HYDROCHLOROTHIAZIDE 25 MG/1
25 TABLET ORAL DAILY
Status: DISCONTINUED | OUTPATIENT
Start: 2023-03-11 | End: 2023-03-13 | Stop reason: HOSPADM

## 2023-03-10 RX ORDER — IPRATROPIUM BROMIDE AND ALBUTEROL SULFATE .5; 3 MG/3ML; MG/3ML
1 SOLUTION RESPIRATORY (INHALATION) ONCE
Status: COMPLETED | OUTPATIENT
Start: 2023-03-10 | End: 2023-03-10

## 2023-03-10 RX ORDER — FERROUS SULFATE 325(65) MG
325 TABLET ORAL
Status: DISCONTINUED | OUTPATIENT
Start: 2023-03-11 | End: 2023-03-13 | Stop reason: HOSPADM

## 2023-03-10 RX ORDER — BENZONATATE 100 MG/1
100 CAPSULE ORAL 3 TIMES DAILY PRN
Status: DISCONTINUED | OUTPATIENT
Start: 2023-03-10 | End: 2023-03-13 | Stop reason: HOSPADM

## 2023-03-10 RX ORDER — CARVEDILOL 6.25 MG/1
6.25 TABLET ORAL 2 TIMES DAILY WITH MEALS
Status: DISCONTINUED | OUTPATIENT
Start: 2023-03-11 | End: 2023-03-13 | Stop reason: HOSPADM

## 2023-03-10 RX ORDER — FUROSEMIDE 20 MG/1
20 TABLET ORAL DAILY
Status: DISCONTINUED | OUTPATIENT
Start: 2023-03-11 | End: 2023-03-13 | Stop reason: HOSPADM

## 2023-03-10 RX ORDER — ENOXAPARIN SODIUM 100 MG/ML
40 INJECTION SUBCUTANEOUS DAILY
Status: DISCONTINUED | OUTPATIENT
Start: 2023-03-11 | End: 2023-03-13 | Stop reason: HOSPADM

## 2023-03-10 RX ORDER — ALBUTEROL SULFATE 90 UG/1
2 AEROSOL, METERED RESPIRATORY (INHALATION) EVERY 6 HOURS PRN
Status: DISCONTINUED | OUTPATIENT
Start: 2023-03-10 | End: 2023-03-13 | Stop reason: HOSPADM

## 2023-03-10 RX ORDER — ESCITALOPRAM OXALATE 10 MG/1
20 TABLET ORAL DAILY
Status: DISCONTINUED | OUTPATIENT
Start: 2023-03-11 | End: 2023-03-13 | Stop reason: HOSPADM

## 2023-03-10 RX ORDER — LISINOPRIL 10 MG/1
10 TABLET ORAL DAILY
Status: DISCONTINUED | OUTPATIENT
Start: 2023-03-11 | End: 2023-03-12

## 2023-03-10 RX ORDER — METHYLPREDNISOLONE SODIUM SUCCINATE 40 MG/ML
40 INJECTION, POWDER, LYOPHILIZED, FOR SOLUTION INTRAMUSCULAR; INTRAVENOUS EVERY 8 HOURS
Status: DISCONTINUED | OUTPATIENT
Start: 2023-03-11 | End: 2023-03-11

## 2023-03-10 RX ORDER — ACETAMINOPHEN 325 MG/1
650 TABLET ORAL EVERY 6 HOURS PRN
Status: DISCONTINUED | OUTPATIENT
Start: 2023-03-10 | End: 2023-03-13 | Stop reason: HOSPADM

## 2023-03-10 RX ORDER — NICOTINE 21 MG/24HR
1 PATCH, TRANSDERMAL 24 HOURS TRANSDERMAL DAILY
Status: DISCONTINUED | OUTPATIENT
Start: 2023-03-11 | End: 2023-03-13 | Stop reason: HOSPADM

## 2023-03-10 RX ORDER — METHYLPREDNISOLONE SODIUM SUCCINATE 125 MG/2ML
125 INJECTION, POWDER, LYOPHILIZED, FOR SOLUTION INTRAMUSCULAR; INTRAVENOUS ONCE
Status: COMPLETED | OUTPATIENT
Start: 2023-03-10 | End: 2023-03-10

## 2023-03-10 RX ORDER — GUAIFENESIN 600 MG/1
600 TABLET, EXTENDED RELEASE ORAL 2 TIMES DAILY
Status: DISCONTINUED | OUTPATIENT
Start: 2023-03-10 | End: 2023-03-13 | Stop reason: HOSPADM

## 2023-03-10 RX ORDER — NICOTINE 21 MG/24HR
1 PATCH, TRANSDERMAL 24 HOURS TRANSDERMAL DAILY
Status: DISCONTINUED | OUTPATIENT
Start: 2023-03-11 | End: 2023-03-10 | Stop reason: SDUPTHER

## 2023-03-10 RX ORDER — ALBUTEROL SULFATE 2.5 MG/3ML
5 SOLUTION RESPIRATORY (INHALATION) ONCE
Status: COMPLETED | OUTPATIENT
Start: 2023-03-10 | End: 2023-03-10

## 2023-03-10 RX ORDER — VILAZODONE HYDROCHLORIDE 20 MG/1
40 TABLET ORAL
Status: DISCONTINUED | OUTPATIENT
Start: 2023-03-11 | End: 2023-03-13 | Stop reason: HOSPADM

## 2023-03-10 RX ORDER — ASPIRIN 81 MG/1
81 TABLET ORAL DAILY
Status: DISCONTINUED | OUTPATIENT
Start: 2023-03-11 | End: 2023-03-13 | Stop reason: HOSPADM

## 2023-03-10 RX ORDER — LEVALBUTEROL 1.25 MG/.5ML
1.25 SOLUTION, CONCENTRATE RESPIRATORY (INHALATION)
Status: DISCONTINUED | OUTPATIENT
Start: 2023-03-10 | End: 2023-03-13 | Stop reason: HOSPADM

## 2023-03-10 RX ORDER — BUDESONIDE AND FORMOTEROL FUMARATE DIHYDRATE 160; 4.5 UG/1; UG/1
2 AEROSOL RESPIRATORY (INHALATION) 2 TIMES DAILY
Status: DISCONTINUED | OUTPATIENT
Start: 2023-03-10 | End: 2023-03-13 | Stop reason: HOSPADM

## 2023-03-10 RX ORDER — ALBUTEROL SULFATE 2.5 MG/3ML
1 SOLUTION RESPIRATORY (INHALATION) ONCE
Status: COMPLETED | OUTPATIENT
Start: 2023-03-10 | End: 2023-03-10

## 2023-03-10 RX ORDER — PRAVASTATIN SODIUM 20 MG
20 TABLET ORAL
Status: DISCONTINUED | OUTPATIENT
Start: 2023-03-11 | End: 2023-03-13 | Stop reason: HOSPADM

## 2023-03-10 RX ADMIN — BUDESONIDE AND FORMOTEROL FUMARATE DIHYDRATE 2 PUFF: 160; 4.5 AEROSOL RESPIRATORY (INHALATION) at 22:00

## 2023-03-10 RX ADMIN — IPRATROPIUM BROMIDE 0.5 MG: 0.5 SOLUTION RESPIRATORY (INHALATION) at 20:03

## 2023-03-10 RX ADMIN — LEVALBUTEROL HYDROCHLORIDE 1.25 MG: 1.25 SOLUTION, CONCENTRATE RESPIRATORY (INHALATION) at 22:55

## 2023-03-10 RX ADMIN — GUAIFENESIN 600 MG: 600 TABLET ORAL at 22:00

## 2023-03-10 RX ADMIN — IPRATROPIUM BROMIDE 0.5 MG: 0.5 SOLUTION RESPIRATORY (INHALATION) at 22:55

## 2023-03-10 RX ADMIN — METHYLPREDNISOLONE SODIUM SUCCINATE 125 MG: 125 INJECTION, POWDER, FOR SOLUTION INTRAMUSCULAR; INTRAVENOUS at 20:03

## 2023-03-10 RX ADMIN — AZITHROMYCIN MONOHYDRATE 500 MG: 500 INJECTION, POWDER, LYOPHILIZED, FOR SOLUTION INTRAVENOUS at 22:00

## 2023-03-10 RX ADMIN — ALBUTEROL SULFATE 5 MG: 2.5 SOLUTION RESPIRATORY (INHALATION) at 20:03

## 2023-03-11 LAB
2HR DELTA HS TROPONIN: 0 NG/L
4HR DELTA HS TROPONIN: -2 NG/L
ALBUMIN SERPL BCP-MCNC: 3.6 G/DL (ref 3.5–5)
ALP SERPL-CCNC: 58 U/L (ref 34–104)
ALT SERPL W P-5'-P-CCNC: 4 U/L (ref 7–52)
ANION GAP SERPL CALCULATED.3IONS-SCNC: 6 MMOL/L (ref 4–13)
AST SERPL W P-5'-P-CCNC: 8 U/L (ref 13–39)
BACTERIA BLD CULT: NORMAL
BACTERIA BLD CULT: NORMAL
BILIRUB SERPL-MCNC: 0.31 MG/DL (ref 0.2–1)
BUN SERPL-MCNC: 17 MG/DL (ref 5–25)
CALCIUM SERPL-MCNC: 9.2 MG/DL (ref 8.4–10.2)
CARDIAC TROPONIN I PNL SERPL HS: 5 NG/L
CARDIAC TROPONIN I PNL SERPL HS: 7 NG/L
CHLORIDE SERPL-SCNC: 101 MMOL/L (ref 96–108)
CO2 SERPL-SCNC: 33 MMOL/L (ref 21–32)
CREAT SERPL-MCNC: 0.65 MG/DL (ref 0.6–1.3)
ERYTHROCYTE [DISTWIDTH] IN BLOOD BY AUTOMATED COUNT: 14 % (ref 11.6–15.1)
GFR SERPL CREATININE-BSD FRML MDRD: 86 ML/MIN/1.73SQ M
GLUCOSE P FAST SERPL-MCNC: 144 MG/DL (ref 65–99)
GLUCOSE SERPL-MCNC: 144 MG/DL (ref 65–140)
HCT VFR BLD AUTO: 26.4 % (ref 34.8–46.1)
HGB BLD-MCNC: 8.2 G/DL (ref 11.5–15.4)
MCH RBC QN AUTO: 30.5 PG (ref 26.8–34.3)
MCHC RBC AUTO-ENTMCNC: 31.1 G/DL (ref 31.4–37.4)
MCV RBC AUTO: 98 FL (ref 82–98)
PLATELET # BLD AUTO: 230 THOUSANDS/UL (ref 149–390)
PMV BLD AUTO: 9.7 FL (ref 8.9–12.7)
POTASSIUM SERPL-SCNC: 4.1 MMOL/L (ref 3.5–5.3)
PROCALCITONIN SERPL-MCNC: <0.05 NG/ML
PROCALCITONIN SERPL-MCNC: <0.05 NG/ML
PROT SERPL-MCNC: 6.8 G/DL (ref 6.4–8.4)
RBC # BLD AUTO: 2.69 MILLION/UL (ref 3.81–5.12)
SODIUM SERPL-SCNC: 140 MMOL/L (ref 135–147)
WBC # BLD AUTO: 11.15 THOUSAND/UL (ref 4.31–10.16)

## 2023-03-11 RX ORDER — METHYLPREDNISOLONE SODIUM SUCCINATE 40 MG/ML
40 INJECTION, POWDER, LYOPHILIZED, FOR SOLUTION INTRAMUSCULAR; INTRAVENOUS EVERY 12 HOURS SCHEDULED
Status: DISCONTINUED | OUTPATIENT
Start: 2023-03-11 | End: 2023-03-12

## 2023-03-11 RX ADMIN — VILAZODONE HYDROCHLORIDE 40 MG: 20 TABLET ORAL at 08:02

## 2023-03-11 RX ADMIN — GUAIFENESIN 600 MG: 600 TABLET ORAL at 17:02

## 2023-03-11 RX ADMIN — LISINOPRIL 10 MG: 10 TABLET ORAL at 08:01

## 2023-03-11 RX ADMIN — HYDROCHLOROTHIAZIDE 25 MG: 25 TABLET ORAL at 08:01

## 2023-03-11 RX ADMIN — ASPIRIN 81 MG: 81 TABLET, COATED ORAL at 08:00

## 2023-03-11 RX ADMIN — ENOXAPARIN SODIUM 40 MG: 40 INJECTION SUBCUTANEOUS at 08:01

## 2023-03-11 RX ADMIN — ESCITALOPRAM OXALATE 20 MG: 10 TABLET, FILM COATED ORAL at 08:01

## 2023-03-11 RX ADMIN — LEVALBUTEROL HYDROCHLORIDE 1.25 MG: 1.25 SOLUTION, CONCENTRATE RESPIRATORY (INHALATION) at 19:52

## 2023-03-11 RX ADMIN — IPRATROPIUM BROMIDE 0.5 MG: 0.5 SOLUTION RESPIRATORY (INHALATION) at 19:52

## 2023-03-11 RX ADMIN — BENZONATATE 100 MG: 100 CAPSULE ORAL at 15:35

## 2023-03-11 RX ADMIN — CARVEDILOL 6.25 MG: 6.25 TABLET, FILM COATED ORAL at 15:35

## 2023-03-11 RX ADMIN — GUAIFENESIN 600 MG: 600 TABLET ORAL at 08:01

## 2023-03-11 RX ADMIN — LEVALBUTEROL HYDROCHLORIDE 1.25 MG: 1.25 SOLUTION, CONCENTRATE RESPIRATORY (INHALATION) at 08:28

## 2023-03-11 RX ADMIN — METHYLPREDNISOLONE SODIUM SUCCINATE 40 MG: 40 INJECTION, POWDER, FOR SOLUTION INTRAMUSCULAR; INTRAVENOUS at 08:00

## 2023-03-11 RX ADMIN — CARVEDILOL 6.25 MG: 6.25 TABLET, FILM COATED ORAL at 08:00

## 2023-03-11 RX ADMIN — LEVALBUTEROL HYDROCHLORIDE 1.25 MG: 1.25 SOLUTION, CONCENTRATE RESPIRATORY (INHALATION) at 14:11

## 2023-03-11 RX ADMIN — AZITHROMYCIN MONOHYDRATE 500 MG: 500 INJECTION, POWDER, LYOPHILIZED, FOR SOLUTION INTRAVENOUS at 21:03

## 2023-03-11 RX ADMIN — BUDESONIDE AND FORMOTEROL FUMARATE DIHYDRATE 2 PUFF: 160; 4.5 AEROSOL RESPIRATORY (INHALATION) at 08:02

## 2023-03-11 RX ADMIN — Medication 325 MG: at 08:01

## 2023-03-11 RX ADMIN — IPRATROPIUM BROMIDE 0.5 MG: 0.5 SOLUTION RESPIRATORY (INHALATION) at 08:28

## 2023-03-11 RX ADMIN — FUROSEMIDE 20 MG: 20 TABLET ORAL at 08:01

## 2023-03-11 RX ADMIN — ZANUBRUTINIB 160 MG: 80 CAPSULE, GELATIN COATED ORAL at 21:02

## 2023-03-11 RX ADMIN — BUDESONIDE AND FORMOTEROL FUMARATE DIHYDRATE 2 PUFF: 160; 4.5 AEROSOL RESPIRATORY (INHALATION) at 17:02

## 2023-03-11 RX ADMIN — IPRATROPIUM BROMIDE 0.5 MG: 0.5 SOLUTION RESPIRATORY (INHALATION) at 14:11

## 2023-03-11 RX ADMIN — METHYLPREDNISOLONE SODIUM SUCCINATE 40 MG: 40 INJECTION, POWDER, FOR SOLUTION INTRAMUSCULAR; INTRAVENOUS at 22:22

## 2023-03-11 RX ADMIN — PRAVASTATIN SODIUM 20 MG: 20 TABLET ORAL at 15:34

## 2023-03-11 NOTE — ASSESSMENT & PLAN NOTE
· Suspected in the setting of underlying malignancy  · No active signs of bleeding  · Follows outpatient with Oncology  · Monitor CBC as needed

## 2023-03-11 NOTE — ASSESSMENT & PLAN NOTE
Patient presenting to the ED for worsening shortness of breath and weakness  Patient was admitted for similar on 3/6/2023  She reports associated productive cough  Patient is a daily smoker  Denies any associated fever/chills, chest pain, abdominal pain, nausea or vomiting    · Chronically on 4L NC at home  · Does not have additional oxygen requirements on admission  · Received IV solumedrol and nebulizers in the ED with minimal relief  · Continue home inhalers, scheduled nebs, IV steroids, azithromycin  · Respiratory protocol  · Continuous pulse ox, monitor respiratory status closely

## 2023-03-11 NOTE — ASSESSMENT & PLAN NOTE
· Blood pressure was elevated on presentation with systolic in the 297L  · Continue home carvedilol, enalapril, lasix, HCTZ  · Monitor vitals per routine

## 2023-03-11 NOTE — RESPIRATORY THERAPY NOTE
RT Protocol Note  Mary Anne Macias 69 y o  female MRN: [id number]  Unit/Bed#: -01 Encounter: [id number]    Assessment    Principal Problem:    COPD with acute exacerbation ([place] [address] )  Active Problems:    Essential hypertension    CAD (coronary artery disease), native coronary artery    SIRS (systemic inflammatory response syndrome) ([facility])    Anemia    Waldenstrom macroglobulinemia ([facility])      Home Pulmonary Medications:  Symbicort, Albuterol Neb Tid  Home Devices/Therapy: Home O2 (4L)    Past Medical History:   Diagnosis Date   • Acute congestive heart failure ([place] [address] ) 8/27/2021   • Anxiety    • Cardiac disease    • Chest pain 8/27/2021   • Chronic pain disorder    • COPD (chronic obstructive pulmonary disease) ([facility])    • Depression    • Heart disease    • Hyperlipidemia    • Hypertension    • MI (myocardial infarction) ([place] [address] )    • MRSA (methicillin resistant Staphylococcus aureus)    • Renal disorder     benign kidney tumor     Social History     Socioeconomic History   • Marital status:      Spouse name: None   • Number of children: 3   • Years of education: 15   • Highest education level: High school graduate   Occupational History   • Occupation: [employer]     Employer: [employer]     Comment: retired    Tobacco Use   • Smoking status: Every Day     Packs/day: 1 00     Years: 60 00     Pack years: 60 00     Types: Cigarettes   • Smokeless tobacco: Never   • Tobacco comments:     She has close to a 60 pack-year smoking history, most recently has cut down to 4-5 cigarettes daily   Vaping Use   • Vaping Use: Never used   Substance and Sexual Activity   • Alcohol use: Never   • Drug use: No   • Sexual activity: Not Currently   Other Topics Concern   • None   Social History Narrative   • None     Social Determinants of Health     Financial Resource Strain: Not on file   Food Insecurity: Not on file   Transportation Needs: Not on file   Physical Activity: Not on file   Stress: Not on file   Social Connections: Not on file   Intimate Partner Violence: Not on file   Housing Stability: Not on file       Subjective    Subjective Data: continue with scheduled nebs    Objective    Physical Exam:   General Appearance: Alert, Awake  Respiratory Pattern: Dyspnea with exertion  Chest Assessment: Chest expansion symmetrical  Bilateral Breath Sounds: Crackles, Scattered  Cough: Non-productive, Strong, Moist  O2 Device: 4L o2 nc    Vitals:  Blood pressure (!) 183/93, pulse 101, temperature 97 8 °F (36 6 °C), resp  rate 15, height 5' 7" (1 702 m), weight 50 2 kg (110 lb 10 7 oz), SpO2 92 %, not currently breastfeeding            Imaging and other studies:     O2 Device: 4L o2 nc     Plan    Respiratory Plan: Home Bronchodilator Patient pathway  Airway Clearance Plan: Incentive Spirometer     Resp Comments: resting quietly with even non labored respirations, hx of copd with home medication use, o2 dependent at 4L, IS teaching complete and pt able to obtain goal, exertional dyspnea, continue with scheduled nebulizers

## 2023-03-11 NOTE — ASSESSMENT & PLAN NOTE
· Meeting SIRS criteria on admission due to tachycardia, tachypnea, leukocytosis  · Here for COPD exacerbation  · CXR negative for pneumonia  · Flu RSV COVID PCR negative  · Procalcitonin x2 negative  · On azithromycin due to COPD exacerbation  · Monitor infection parameters closely

## 2023-03-11 NOTE — ASSESSMENT & PLAN NOTE
· Meeting SIRS criteria for tachycardia, tachypnea, leukocytosis  · Here for COPD exacerbation  · CXR pending  · Start on azithromycin, check procalcitonin  · Follow up infectious labs

## 2023-03-11 NOTE — PLAN OF CARE
Problem: RESPIRATORY - ADULT  Goal: Achieves optimal ventilation and oxygenation  Description: INTERVENTIONS:  - Assess for changes in respiratory status  - Assess for changes in mentation and behavior  - Position to facilitate oxygenation and minimize respiratory effort  - Oxygen administered by appropriate delivery if ordered  - Initiate smoking cessation education as indicated  - Encourage broncho-pulmonary hygiene including cough, deep breathe, Incentive Spirometry  - Assess the need for suctioning and aspirate as needed  - Assess and instruct to report SOB or any respiratory difficulty  - Respiratory Therapy support as indicated  Outcome: Progressing     Problem: MOBILITY - ADULT  Goal: Maintain or return to baseline ADL function  Description: INTERVENTIONS:  -  Assess patient's ability to carry out ADLs; assess patient's baseline for ADL function and identify physical deficits which impact ability to perform ADLs (bathing, care of mouth/teeth, toileting, grooming, dressing, etc )  - Assess/evaluate cause of self-care deficits   - Assess range of motion  - Assess patient's mobility; develop plan if impaired  - Assess patient's need for assistive devices and provide as appropriate  - Encourage maximum independence but intervene and supervise when necessary  - Involve family in performance of ADLs  - Assess for home care needs following discharge   - Consider OT consult to assist with ADL evaluation and planning for discharge  - Provide patient education as appropriate  Outcome: Progressing

## 2023-03-11 NOTE — MALNUTRITION/BMI
This medical record reflects one or more clinical indicators suggestive of malnutrition and/or morbid obesity  Malnutrition Findings:   Adult Malnutrition type: Chronic illness  Adult Degree of Malnutrition: Malnutrition of moderate degree  Malnutrition Characteristics: Fat loss, Muscle loss, Inadequate energy              360 Statement: Malnutrition related to inadequate energy intake as evidenced by subcutaneous fat loss orbital/cheek region, extremities and trunk, temporal wasting, <75% energy intake compared to estimated needs >1 month  Regular diet thin liquids with chocolate ensure plus high protein at breakfast    BMI Findings:  Adult BMI Classifications: Underweight < 18 5        Body mass index is 17 33 kg/m²  See Nutrition note dated 3/11/2023  for additional details  Completed nutrition assessment is viewable in the nutrition documentation

## 2023-03-11 NOTE — ASSESSMENT & PLAN NOTE
Patient presenting to the ED for worsening shortness of breath and weakness  associated productive cough  Patient was admitted for similar symptoms on 3/6/2023  Patient is a daily smoker  · Chronically on 4L NC at home  · Does not have additional oxygen requirements on admission  · Received IV solumedrol and nebulizers in the ED with minimal relief  · Continue IV Solu-Medrol  · Symbicort inhaler/as needed albuterol inhaler  · Atrovent/Xopenex 3 times daily  · Mucinex/Tessalon Perles    · Respiratory protocol

## 2023-03-11 NOTE — PROGRESS NOTES
[facility]  Progress Note Micaela Bowie 1945, 69 y o  female MRN: [id number]  Unit/Bed#: -[place] Encounter: [id number]  Primary Care Provider: Curry Hollis MD   Date and time admitted to hospital: 3/10/2023  7:53 PM    SIRS (systemic inflammatory response syndrome) ([facility] )  Assessment & Plan  · Meeting SIRS criteria on admission due to tachycardia, tachypnea, leukocytosis  · Here for COPD exacerbation  · CXR negative for pneumonia  · Flu RSV COVID PCR negative  · Procalcitonin x2 negative  · On azithromycin due to COPD exacerbation  · Monitor infection parameters closely  * COPD with acute exacerbation [facility])  Assessment & Plan  Patient presenting to the ED for worsening shortness of breath and weakness  associated productive cough  Patient was admitted for similar symptoms on 3/6/2023  Patient is a daily smoker  · Chronically on 4L NC at home  · Does not have additional oxygen requirements on admission  · Received IV solumedrol and nebulizers in the ED with minimal relief  · Continue IV Solu-Medrol  · Symbicort inhaler/as needed albuterol inhaler  · Atrovent/Xopenex 3 times daily  · Mucinex/Tessalon Perles    · Respiratory protocol      Waldenstrom macroglobulinemia ([facility] )  Assessment & Plan  · Outpatient follow up with Oncology    Anemia  Assessment & Plan  · Suspected in the setting of underlying malignancy  · No active signs of bleeding  · Follows outpatient with Oncology  · Monitor CBC as needed    CAD (coronary artery disease), native coronary artery  Assessment & Plan  · Denies any chest pain  · Continue asa and statin therapy    Essential hypertension  Assessment & Plan  · Blood pressure was elevated on presentation with systolic in the 910E  · Continue home carvedilol, enalapril, lasix, HCTZ  · Monitor vitals per routine      VTE Pharmacologic Prophylaxis:   Pharmacologic: Enoxaparin (Lovenox)  Mechanical VTE Prophylaxis in Place: Yes    Patient Centered Rounds: I have performed bedside rounds with nursing staff today  Education and Discussions with Family / Patient: patient  Declines call to family    Time Spent for Care: 30 minutes  More than 50% of total time spent on counseling and coordination of care as described above  Current Length of Stay: 0 day(s)    Current Patient Status: Inpatient   Certification Statement: The patient will continue to require additional inpatient hospital stay due to needs iv steroids, nebs    Discharge Plan: ?48hrs    Code Status: Level 3 - DNAR and DNI      Subjective:   Currently saturating 92% on 4 L which is her baseline  Does report feeling improvement regarding shortness of breath/wheezing  Still having symptoms on exertion  She was also concerned about why her blood pressures were running on the higher range but now improved with systolic in the 514L, continue to monitor  Objective:     Vitals:   Temp (24hrs), Av 9 °F (36 6 °C), Min:97 8 °F (36 6 °C), Max:98 °F (36 7 °C)    Temp:  [97 8 °F (36 6 °C)-98 °F (36 7 °C)] 98 °F (36 7 °C)  HR:  [] 114  Resp:  [15-22] 19  BP: (148-195)/() 148/103  SpO2:  [91 %-95 %] 94 %  Body mass index is 17 33 kg/m²  Input and Output Summary (last 24 hours): Intake/Output Summary (Last 24 hours) at 3/11/2023 1050  Last data filed at 3/11/2023 0901  Gross per 24 hour   Intake 720 ml   Output --   Net 720 ml       Physical Exam:     Physical Exam  Constitutional:       General: She is not in acute distress  Comments: Frail in appearance   HENT:      Mouth/Throat:      Mouth: Mucous membranes are moist       Pharynx: Oropharynx is clear  Cardiovascular:      Rate and Rhythm: Regular rhythm  Tachycardia present  Pulses: Normal pulses  Pulmonary:      Comments: Diminished breath sounds bilaterally  Scattered expiratory wheezes  Abdominal:      General: Abdomen is flat  Bowel sounds are normal       Palpations: Abdomen is soft     Musculoskeletal:      Right lower leg: No edema  Left lower leg: No edema  Neurological:      General: No focal deficit present  Mental Status: She is alert and oriented to person, place, and time  Additional Data:     Labs:    Results from last 7 days   Lab Units 03/11/23  0506 03/10/23  2003 03/07/23  0549 03/06/23  1019   WBC Thousand/uL 11 15* 17 93*   < > 10 15   HEMOGLOBIN g/dL 8 2* 8 9*   < > 8 1*   HEMATOCRIT % 26 4* 29 3*   < > 26 6*   PLATELETS Thousands/uL 230 267   < > 184   NEUTROS PCT %  --   --   --  43   LYMPHS PCT %  --   --   --  47*   LYMPHO PCT %  --  40  --   --    MONOS PCT %  --   --   --  9   MONO PCT %  --  6  --   --    EOS PCT %  --  2  --  1    < > = values in this interval not displayed  Results from last 7 days   Lab Units 03/11/23  0506   SODIUM mmol/L 140   POTASSIUM mmol/L 4 1   CHLORIDE mmol/L 101   CO2 mmol/L 33*   BUN mg/dL 17   CREATININE mg/dL 0 65   ANION GAP mmol/L 6   CALCIUM mg/dL 9 2   ALBUMIN g/dL 3 6   TOTAL BILIRUBIN mg/dL 0 31   ALK PHOS U/L 58   ALT U/L 4*   AST U/L 8*   GLUCOSE RANDOM mg/dL 144*                 Results from last 7 days   Lab Units 03/11/23  0506 03/10/23  2343 03/07/23  0549 03/06/23  1019   LACTIC ACID mmol/L  --   --   --  0 5   PROCALCITONIN ng/ml <0 05 <0 05 <0 05 <0 05       * I Have Reviewed All Lab Data Listed Above  * Additional Pertinent Lab Tests Reviewed: All Labs Within Last 24 Hours Reviewed      Recent Cultures (last 7 days):     Results from last 7 days   Lab Units 03/06/23  1019   BLOOD CULTURE  No Growth After 4 Days  No Growth After 4 Days         Last 24 Hours Medication List:   Current Facility-Administered Medications   Medication Dose Route Frequency Provider Last Rate   • acetaminophen  650 mg Oral Q6H PRN Carnell Gosselin, PA-C     • albuterol  2 puff Inhalation Q6H PRN Carnell Gosselin, PA-C     • aspirin  81 mg Oral Daily Margot Carlisle PA-C     • azithromycin  500 mg Intravenous Q24H Margot Carlisle PA-C     • benzonatate  100 mg Oral TID PRN Margot Carlisle PA-C     • budesonide-formoterol  2 puff Inhalation BID Anirudh Yuan PA-C     • carvedilol  6 25 mg Oral BID With Meals Anirudh Yuan PA-C     • enoxaparin  40 mg Subcutaneous Daily Margot Carlisle PA-C     • escitalopram  20 mg Oral Daily Margot Carlisle PA-C     • ferrous sulfate  325 mg Oral Daily With Breakfast Margot Carlisle PA-C     • furosemide  20 mg Oral Daily Margot Carlisle PA-C     • guaiFENesin  600 mg Oral BID Margot Carlisle PA-C     • hydrochlorothiazide  25 mg Oral Daily Margot Carlisle PA-C     • ipratropium  0 5 mg Nebulization TID Anirudh Yuan PA-C     • levalbuterol  1 25 mg Nebulization TID Domenic YAÑEZ MD     • lisinopril  10 mg Oral Daily Margot Carlisle PA-C     • methylPREDNISolone sodium succinate  40 mg Intravenous Q12H Cathlyn Goodell, MD     • nicotine  1 patch Transdermal Daily Margot Carlisle PA-C     • pravastatin  20 mg Oral Daily With [employer], PA-C     • vilazodone  40 mg Oral Daily With Breakfast Anirudh Yuan PA-C          Today, Patient Was Seen By: MILI Esparza      ** Please Note: Dictation voice to text software may have been used in the creation of this document   **

## 2023-03-11 NOTE — H&P
[facility]  H&P- Mary Anne Macias 1945, 69 y o  female MRN: [id number]  Unit/Bed#: MATEO Encounter: [id number]  Primary Care Provider: J Carlos Carrero MD   Date and time admitted to hospital: 3/10/2023  7:53 PM    * COPD with acute exacerbation [facility])  Assessment & Plan  Patient presenting to the ED for worsening shortness of breath and weakness  Patient was admitted for similar on 3/6/2023  She reports associated productive cough  Patient is a daily smoker  Denies any associated fever/chills, chest pain, abdominal pain, nausea or vomiting    · Chronically on 4L NC at home  · Does not have additional oxygen requirements on admission  · Received IV solumedrol and nebulizers in the ED with minimal relief  · Continue home inhalers, scheduled nebs, IV steroids, azithromycin  · Respiratory protocol  · Continuous pulse ox, monitor respiratory status closely    SIRS (systemic inflammatory response syndrome) (HCC)  Assessment & Plan  · Meeting SIRS criteria for tachycardia, tachypnea, leukocytosis  · Here for COPD exacerbation  · CXR pending  · Start on azithromycin, check procalcitonin  · Follow up infectious labs    Waldenstrom macroglobulinemia ([facility] [address] )  Assessment & Plan  · Outpatient follow up with Oncology    Anemia  Assessment & Plan  · Suspected in the setting of underlying malignancy  · No active signs of bleeding  · Follows outpatient with Oncology  · Monitor CBC as needed    CAD (coronary artery disease), native coronary artery  Assessment & Plan  · Denies any chest pain  · Continue asa and statin therapy    Essential hypertension  Assessment & Plan  · Continue home amlodipine, carvedilol, enalapril, lasix, HCTZ  · Monitor vitals per routine    VTE Prophylaxis: Enoxaparin (Lovenox)  / sequential compression device   Code Status: DNR/DNI  Discussion with family: Update in the AM    Anticipated Length of Stay:  Patient will be admitted on an Observation basis with an anticipated length of stay of  Less than 2 midnights  Justification for Hospital Stay: COPD exacerbation    Total Time for Visit, including Counseling / Coordination of Care: 90 minutes  Greater than 50% of this total time spent on direct patient counseling and coordination of care  Chief Complaint:   SOB    History of Present Illness:    Mckenna Stout is a 69 y o  female who has a past medical history significant for hypertension, coronary artery disease, COPD, anemia, macroglobulinemia  Patient presenting to the ED for worsening shortness of breath and weakness  Patient was admitted for similar on 3/6/2023  She reports associated productive cough  Patient is a daily smoker  Denies any associated fever/chills, chest pain, abdominal pain, nausea or vomiting  Patient requiring medical admission for COPD exacerbation  All patient questions answered to the best of my ability  Review of Systems:    Review of Systems   Constitutional: Negative for chills and fever  HENT: Negative for ear pain and sore throat  Eyes: Negative for pain and visual disturbance  Respiratory: Positive for cough, shortness of breath and wheezing  Cardiovascular: Negative for chest pain and palpitations  Gastrointestinal: Negative for abdominal pain and vomiting  Genitourinary: Negative for dysuria and hematuria  Musculoskeletal: Negative for arthralgias and back pain  Skin: Negative for color change and rash  Neurological: Positive for weakness  Negative for seizures and syncope  All other systems reviewed and are negative        Past Medical and Surgical History:     Past Medical History:   Diagnosis Date   • Acute congestive heart failure ([facility] [address] ) 8/27/2021   • Anxiety    • Cardiac disease    • Chest pain 8/27/2021   • Chronic pain disorder    • COPD (chronic obstructive pulmonary disease) ([facility])    • Depression    • Heart disease    • Hyperlipidemia    • Hypertension    • MI (myocardial infarction) ([facility])    • MRSA (methicillin resistant Staphylococcus aureus)    • Renal disorder     benign kidney tumor       Past Surgical History:   Procedure Laterality Date   • APPENDECTOMY     • ELBOW BURSA SURGERY Left 02/2018    D/T MRSA INFECTION   • SPINAL FUSION      L7 L9       Meds/Allergies:    Prior to Admission medications    Medication Sig Start Date End Date Taking? Authorizing Provider   albuterol (VENTOLIN HFA) 90 mcg/act inhaler Inhale 2 puffs every 6 (six) hours as needed for wheezing 1/7/20   Natali Garcia PA-C   alendronate (FOSAMAX) 35 mg tablet Take 35 mg by mouth every 7 days Patient takes this med every Sunday  Patient not taking: Reported on 8/27/2021    Historical Provider, MD   amLODIPine (NORVASC) 5 mg tablet Take 1 tablet (5 mg total) by mouth daily  Patient not taking: Reported on 3/30/2021 1/7/20   Natali Garcia PA-C   ascorbic acid (VITAMIN C) 500 mg tablet Take 500 mg by mouth daily  Patient not taking: Reported on 8/27/2021    Historical Provider, MD   aspirin (ECOTRIN LOW STRENGTH) 81 mg EC tablet Take 1 tablet (81 mg total) by mouth daily 8/31/21 9/30/21  Maureen Alcantara MD   benzonatate (TESSALON PERLES) 100 mg capsule Take 1 capsule (100 mg total) by mouth 3 (three) times a day as needed for cough 3/7/23   Gillian Wang PA-C   budesonide-formoterol (SYMBICORT) 160-4 5 mcg/act inhaler Inhale 2 puffs 2 (two) times a day Rinse mouth after use   1/7/20   Natali Garcia PA-C   Calcium-Magnesium-Vitamin D (CALCIUM 500 PO) Take 1,000 mg by mouth daily    Historical Provider, MD   carvedilol (COREG) 6 25 mg tablet Take 1 tablet (6 25 mg total) by mouth 2 (two) times a day with meals 8/30/21 9/29/21  Maureen Alcantara MD   cholecalciferol (VITAMIN D3) 1,000 units tablet Take 1 tablet (1,000 Units total) by mouth daily  Patient not taking: Reported on 3/30/2021 1/8/20   Natali Garcia PA-C   enalapril (VASOTEC) 5 mg tablet Take 1 tablet (5 mg total) by mouth 2 (two) times a day 1/8/20   Estephanie Garcia PA-C   escitalopram (LEXAPRO) 10 mg tablet Take 1 tablet (10 mg total) by mouth daily  Patient taking differently: Take 20 mg by mouth daily  1/8/20   TAMMIE Solares   ferrous sulfate 325 (65 Fe) mg tablet Take 325 mg by mouth daily with breakfast    Historical Provider, MD   furosemide (LASIX) 20 mg tablet Take 1 tablet (20 mg total) by mouth daily 8/31/21 9/30/21  Daniel Carrillo MD   guaiFENesin (MUCINEX) 600 mg 12 hr tablet Take 1 tablet (600 mg total) by mouth 2 (two) times a day  Patient not taking: Reported on 3/30/2021 12/23/18   Daquan Oneil MD   hydrochlorothiazide (HYDRODIURIL) 25 mg tablet Take 1 tablet (25 mg total) by mouth daily 1/7/20   Estephanie Garcia PA-C   HYDROcodone-acetaminophen (NORCO) 5-325 mg per tablet Take 1 tablet by mouth every 6 (six) hours as needed for pain    Historical Provider, MD   levalbuterol (XOPENEX) 1 25 mg/0 5 mL nebulizer solution Take 0 5 mL (1 25 mg total) by nebulization 3 (three) times a day  Patient not taking: Reported on 8/27/2021 12/23/18   Daquan Oneil MD   melatonin 3 mg Take 1 tablet (3 mg total) by mouth daily at bedtime as needed (insomnia) 1/7/20   Tennille DecemberTAMMIE   nicotine (NICODERM CQ) 21 mg/24 hr TD 24 hr patch Place 1 patch on the skin over 24 hours daily Do not start before March 8, 2023  3/8/23   Néstor Wang PA-C   nitroglycerin (NITROSTAT) 0 4 mg SL tablet Place 0 4 mg under the tongue every 5 (five) minutes as needed for chest pain    Historical Provider, MD   prasugrel (EFFIENT) tablet Take 10 mg by mouth  Patient not taking: Reported on 8/27/2021    Historical Provider, MD   pravastatin (PRAVACHOL) 20 mg tablet Take 1 tablet (20 mg total) by mouth daily with dinner 8/30/21 9/29/21  Daniel Carrillo MD   predniSONE 20 mg tablet Take 2 tablets (40 mg total) by mouth daily for 2 days, THEN 1 tablet (20 mg total) daily for 2 days   3/7/23 3/11/23  Néstor Wang PA-C   sodium chloride 0 9 % nebulizer solution Take 3 mL by nebulization 3 (three) times a day 12/23/18   Clara Garza MD   vilazodone (Viibryd) 40 mg tablet Take 40 mg by mouth daily with breakfast    Historical Provider, MD     I have reviewed home medications using allscripts  Allergies: Allergies   Allergen Reactions   • Sulfa Antibiotics Anaphylaxis   • Niacin    • Statins Other (See Comments)     cramps       Social History:     Marital Status:    Occupation: NA  Patient Pre-hospital Living Situation: Home  Patient Pre-hospital Level of Mobility: Walks  Patient Pre-hospital Diet Restrictions: None  Substance Use History:   Social History     Substance and Sexual Activity   Alcohol Use Never     Social History     Tobacco Use   Smoking Status Every Day   • Packs/day: 1 00   • Years: 60 00   • Pack years: 60 00   • Types: Cigarettes   Smokeless Tobacco Never   Tobacco Comments    She has close to a 60 pack-year smoking history, most recently has cut down to 4-5 cigarettes daily     Social History     Substance and Sexual Activity   Drug Use No       Family History:    Family History   Problem Relation Age of Onset   • Heart disease Mother    • Cancer Father        Physical Exam:     Vitals:   Blood Pressure: (!) 195/92 (03/10/23 1957)  Pulse: 99 (03/10/23 1957)  Temperature: 97 9 °F (36 6 °C) (03/10/23 1957)  Temp Source: Oral (03/10/23 1957)  Respirations: 22 (03/10/23 1957)  SpO2: 91 % (03/10/23 1957)    Physical Exam  Vitals and nursing note reviewed  Constitutional:       General: She is not in acute distress  Appearance: She is well-developed  HENT:      Head: Normocephalic and atraumatic  Eyes:      Conjunctiva/sclera: Conjunctivae normal    Cardiovascular:      Rate and Rhythm: Normal rate and regular rhythm  Heart sounds: No murmur heard  Pulmonary:      Effort: Respiratory distress present  Breath sounds: Wheezing present        Comments: 4L NC which is her baseline  Abdominal: Palpations: Abdomen is soft  Tenderness: There is no abdominal tenderness  Musculoskeletal:         General: No swelling  Cervical back: Neck supple  Skin:     General: Skin is warm and dry  Capillary Refill: Capillary refill takes less than 2 seconds  Neurological:      Mental Status: She is alert and oriented to person, place, and time  Psychiatric:         Mood and Affect: Mood normal          Additional Data:     Lab Results: I have personally reviewed pertinent reports  Results from last 7 days   Lab Units 03/10/23  2003 03/07/23  0549 03/06/23  1019   WBC Thousand/uL 17 93*   < > 10 15   HEMOGLOBIN g/dL 8 9*   < > 8 1*   HEMATOCRIT % 29 3*   < > 26 6*   PLATELETS Thousands/uL 267   < > 184   NEUTROS PCT %  --   --  43   LYMPHS PCT %  --   --  47*   LYMPHO PCT % 40  --   --    MONOS PCT %  --   --  9   MONO PCT % 6  --   --    EOS PCT % 2  --  1    < > = values in this interval not displayed  Results from last 7 days   Lab Units 03/10/23  2003   SODIUM mmol/L 141   POTASSIUM mmol/L 3 9   CHLORIDE mmol/L 100   CO2 mmol/L 36*   BUN mg/dL 18   CREATININE mg/dL 0 72   ANION GAP mmol/L 5   CALCIUM mg/dL 9 4   ALBUMIN g/dL 3 8   TOTAL BILIRUBIN mg/dL 0 30   ALK PHOS U/L 69   ALT U/L 4*   AST U/L 9*   GLUCOSE RANDOM mg/dL 104                 Results from last 7 days   Lab Units 03/07/23  0549 03/06/23  1019   LACTIC ACID mmol/L  --  0 5   PROCALCITONIN ng/ml <0 05 <0 05       Imaging: I have personally reviewed pertinent reports  XR chest 1 view portable    (Results Pending)       EKG, Pathology, and Other Studies Reviewed on Admission:   · EKG: None obtained    Allscripts / Epic Records Reviewed: Yes     ** Please Note: This note has been constructed using a voice recognition system   **

## 2023-03-12 LAB
ANION GAP SERPL CALCULATED.3IONS-SCNC: 9 MMOL/L (ref 4–13)
BUN SERPL-MCNC: 23 MG/DL (ref 5–25)
CALCIUM SERPL-MCNC: 9.8 MG/DL (ref 8.4–10.2)
CHLORIDE SERPL-SCNC: 99 MMOL/L (ref 96–108)
CO2 SERPL-SCNC: 32 MMOL/L (ref 21–32)
CREAT SERPL-MCNC: 0.82 MG/DL (ref 0.6–1.3)
ERYTHROCYTE [DISTWIDTH] IN BLOOD BY AUTOMATED COUNT: 14.5 % (ref 11.6–15.1)
GFR SERPL CREATININE-BSD FRML MDRD: 69 ML/MIN/1.73SQ M
GLUCOSE SERPL-MCNC: 134 MG/DL (ref 65–140)
HCT VFR BLD AUTO: 29.3 % (ref 34.8–46.1)
HGB BLD-MCNC: 9.1 G/DL (ref 11.5–15.4)
MCH RBC QN AUTO: 30.2 PG (ref 26.8–34.3)
MCHC RBC AUTO-ENTMCNC: 31.1 G/DL (ref 31.4–37.4)
MCV RBC AUTO: 97 FL (ref 82–98)
NRBC BLD AUTO-RTO: 0 /100 WBCS
PLATELET # BLD AUTO: 318 THOUSANDS/UL (ref 149–390)
PMV BLD AUTO: 9.9 FL (ref 8.9–12.7)
POTASSIUM SERPL-SCNC: 3.8 MMOL/L (ref 3.5–5.3)
RBC # BLD AUTO: 3.01 MILLION/UL (ref 3.81–5.12)
SODIUM SERPL-SCNC: 140 MMOL/L (ref 135–147)
WBC # BLD AUTO: 18.34 THOUSAND/UL (ref 4.31–10.16)

## 2023-03-12 RX ORDER — PREDNISONE 20 MG/1
60 TABLET ORAL DAILY
Status: DISCONTINUED | OUTPATIENT
Start: 2023-03-12 | End: 2023-03-13 | Stop reason: HOSPADM

## 2023-03-12 RX ORDER — LISINOPRIL 20 MG/1
20 TABLET ORAL DAILY
Status: DISCONTINUED | OUTPATIENT
Start: 2023-03-12 | End: 2023-03-13 | Stop reason: HOSPADM

## 2023-03-12 RX ORDER — LANOLIN ALCOHOL/MO/W.PET/CERES
6 CREAM (GRAM) TOPICAL
Status: DISCONTINUED | OUTPATIENT
Start: 2023-03-12 | End: 2023-03-13 | Stop reason: HOSPADM

## 2023-03-12 RX ADMIN — LEVALBUTEROL HYDROCHLORIDE 1.25 MG: 1.25 SOLUTION, CONCENTRATE RESPIRATORY (INHALATION) at 20:05

## 2023-03-12 RX ADMIN — CARVEDILOL 6.25 MG: 6.25 TABLET, FILM COATED ORAL at 08:52

## 2023-03-12 RX ADMIN — ESCITALOPRAM OXALATE 20 MG: 10 TABLET, FILM COATED ORAL at 08:48

## 2023-03-12 RX ADMIN — LEVALBUTEROL HYDROCHLORIDE 1.25 MG: 1.25 SOLUTION, CONCENTRATE RESPIRATORY (INHALATION) at 13:38

## 2023-03-12 RX ADMIN — GUAIFENESIN 600 MG: 600 TABLET ORAL at 08:48

## 2023-03-12 RX ADMIN — CARVEDILOL 6.25 MG: 6.25 TABLET, FILM COATED ORAL at 17:28

## 2023-03-12 RX ADMIN — ZANUBRUTINIB 160 MG: 80 CAPSULE, GELATIN COATED ORAL at 08:49

## 2023-03-12 RX ADMIN — BUDESONIDE AND FORMOTEROL FUMARATE DIHYDRATE 2 PUFF: 160; 4.5 AEROSOL RESPIRATORY (INHALATION) at 08:50

## 2023-03-12 RX ADMIN — ACETAMINOPHEN 650 MG: 325 TABLET, FILM COATED ORAL at 04:08

## 2023-03-12 RX ADMIN — ASPIRIN 81 MG: 81 TABLET, COATED ORAL at 08:48

## 2023-03-12 RX ADMIN — IPRATROPIUM BROMIDE 0.5 MG: 0.5 SOLUTION RESPIRATORY (INHALATION) at 07:29

## 2023-03-12 RX ADMIN — Medication 325 MG: at 08:52

## 2023-03-12 RX ADMIN — ZANUBRUTINIB 160 MG: 80 CAPSULE, GELATIN COATED ORAL at 21:38

## 2023-03-12 RX ADMIN — PREDNISONE 60 MG: 20 TABLET ORAL at 08:52

## 2023-03-12 RX ADMIN — IPRATROPIUM BROMIDE 0.5 MG: 0.5 SOLUTION RESPIRATORY (INHALATION) at 20:05

## 2023-03-12 RX ADMIN — GUAIFENESIN 600 MG: 600 TABLET ORAL at 17:28

## 2023-03-12 RX ADMIN — BENZONATATE 100 MG: 100 CAPSULE ORAL at 12:31

## 2023-03-12 RX ADMIN — BUDESONIDE AND FORMOTEROL FUMARATE DIHYDRATE 2 PUFF: 160; 4.5 AEROSOL RESPIRATORY (INHALATION) at 17:30

## 2023-03-12 RX ADMIN — AZITHROMYCIN MONOHYDRATE 500 MG: 500 INJECTION, POWDER, LYOPHILIZED, FOR SOLUTION INTRAVENOUS at 21:39

## 2023-03-12 RX ADMIN — IPRATROPIUM BROMIDE 0.5 MG: 0.5 SOLUTION RESPIRATORY (INHALATION) at 13:39

## 2023-03-12 RX ADMIN — FUROSEMIDE 20 MG: 20 TABLET ORAL at 08:48

## 2023-03-12 RX ADMIN — ENOXAPARIN SODIUM 40 MG: 40 INJECTION SUBCUTANEOUS at 08:48

## 2023-03-12 RX ADMIN — LEVALBUTEROL HYDROCHLORIDE 1.25 MG: 1.25 SOLUTION, CONCENTRATE RESPIRATORY (INHALATION) at 07:29

## 2023-03-12 RX ADMIN — MELATONIN TAB 3 MG 6 MG: 3 TAB at 21:38

## 2023-03-12 RX ADMIN — HYDROCHLOROTHIAZIDE 25 MG: 25 TABLET ORAL at 08:48

## 2023-03-12 RX ADMIN — LISINOPRIL 20 MG: 20 TABLET ORAL at 08:49

## 2023-03-12 RX ADMIN — VILAZODONE HYDROCHLORIDE 40 MG: 20 TABLET ORAL at 08:49

## 2023-03-12 RX ADMIN — PRAVASTATIN SODIUM 20 MG: 20 TABLET ORAL at 17:28

## 2023-03-12 NOTE — CASE MANAGEMENT
Case Management Discharge Planning Note    Patient name Jesse Rios  Location [address] [phone] MRN [id number]  : 1945 Date 3/12/2023       Current Admission Date: 3/10/2023  Current Admission Diagnosis:COPD with acute exacerbation [facility])   Patient Active Problem List    Diagnosis Date Noted   • Waldenstrom macroglobulinemia ([address] ) 2023   • Severe protein-calorie malnutrition ([address] ) 2021   • Positive blood culture 2021   • COPD with acute exacerbation ([address] ) 2021   • SIRS (systemic inflammatory response syndrome) ([address] ) 2021   • Anemia 2021   • Abnormal computed tomography angiography (CTA) 2021   • Fatigue 2021   • Major depressive disorder, recurrent episode, moderate ([address] ) 2019   • Flank pain 2019   • Pneumonia 2019   • Sepsis ([address] ) 2019   • CAD (coronary artery disease), native coronary artery 2018   • Chronic respiratory failure with hypoxia ([address] ) 2018   • Ambulatory dysfunction 2018   • Hypokalemia 2018   • Essential hypertension 2018   • Tobacco abuse 2018      LOS (days): 1  Geometric Mean LOS (GMLOS) (days):   Days to GMLOS:     OBJECTIVE:  Risk of Unplanned Readmission Score: 22 21         Current admission status: Inpatient   Preferred Pharmacy:   [facility]/pharmacy [address] [place] [address] [address] [place] [address]  Phone: [phone] Fax: [phone]    Primary Care Provider: Mario Crespo MD    Primary Insurance: MEDICARE  Secondary Insurance: [place] [employer] [place]    DISCHARGE DETAILS:    Discharge planning discussed with[de-identified] patient  Freedom of Choice: Yes  Comments - Freedom of Choice: CM addressed consult for "medications"  Nursing informs CM that son feels that [address] have made errors when they fill pt's pill box and that is what caused this admission    CM spoke to pt and she is very happy with [address] and is asking for a referral to them on d/c  She does not agree with son's assessment    CM contacted family/caregiver?: No- see comments (pt will update her son herself)  Were Treatment Team discharge recommendations reviewed with patient/caregiver?: Yes  Did patient/caregiver verbalize understanding of patient care needs?: Yes  Were patient/caregiver advised of the risks associated with not following Treatment Team discharge recommendations?: Yes    Contacts  Patient Contacts: Alessandra Wiggins  Relationship to Patient[de-identified] [address]         Is the patient interested in [place] [address] at discharge?: Yes  [address] requested[de-identified] [address], Nursing, Occupational Therapy, Physical [address] Name[de-identified] [address] Provider[de-identified] PCP  Home Health Services Needed[de-identified] COPD Management, Evaluate Functional Status and Safety, Gait/ADL Training, Oxygen Via Nasal Cannula, Strengthening/Theraputic Exercises to Improve Function  Oxygen LPM Ordered (if applicable based on home health services needed):: 4 LPM  Homebound Criteria Met[de-identified] Requires the Assistance of Another Person for Safe Ambulation or to Leave the Home, Uses an Assist Device (i e  cane, walker, etc)  Supporting Clincal Findings[de-identified] Fatigues Easliy in Short Distances, Dyspnea with Exertion, Requires Oxygen, Limited Endurance    DME Referral Provided  Referral made for DME?: No    Other Referral/Resources/Interventions Provided:  Interventions: [facility]  Referral Comments: Refusing STR-wishes to be discharged home and resume services with [facility]    Would you like to participate in our [address] service program?  : No - Declined    Treatment Team Recommendation: Home with [facility]  Discharge Destination Plan[de-identified] Home with Margo at Discharge : Family

## 2023-03-12 NOTE — ASSESSMENT & PLAN NOTE
Patient presenting to the ED for worsening shortness of breath and weakness  associated productive cough  Patient was admitted for similar symptoms on 3/6/2023  Patient is a daily smoker  · Chronically on 4L NC at home  · Does not have additional oxygen requirements on admission  · Received IV solumedrol and nebulizers in the ED with minimal relief  · s/p IV Solu-Medrol - > transition to prednisone taper  · Symbicort inhaler/as needed albuterol inhaler  · Atrovent/Xopenex 3 times daily  · Mucinex/Tessalon Perles    · Respiratory protocol

## 2023-03-12 NOTE — PROGRESS NOTES
[facility]  Progress Note Flaco Hoyt 1945, 69 y o  female MRN: [id number]  Unit/Bed#: -01 Encounter: [id number]  Primary Care Provider: Deneise Hammans, MD   Date and time admitted to hospital: 3/10/2023  7:53 PM    SIRS (systemic inflammatory response syndrome) ([facility] )  Assessment & Plan  · Meeting SIRS criteria on admission due to tachycardia, tachypnea, leukocytosis  · Here for COPD exacerbation  · CXR negative for pneumonia  · Flu RSV COVID PCR negative  · Procalcitonin x2 negative  · On azithromycin due to COPD exacerbation  · Monitor infection parameters closely  * COPD with acute exacerbation [facility])  Assessment & Plan  Patient presenting to the ED for worsening shortness of breath and weakness  associated productive cough  Patient was admitted for similar symptoms on 3/6/2023  Patient is a daily smoker  · Chronically on 4L NC at home  · Does not have additional oxygen requirements on admission  · Received IV solumedrol and nebulizers in the ED with minimal relief  · s/p IV Solu-Medrol - > transition to prednisone taper  · Symbicort inhaler/as needed albuterol inhaler  · Atrovent/Xopenex 3 times daily  · Mucinex/Tessalon Perles    · Respiratory protocol      Waldenstrom macroglobulinemia ([facility] )  Assessment & Plan  · Outpatient follow up with Oncology    Anemia  Assessment & Plan  · Suspected in the setting of underlying malignancy  · No active signs of bleeding  · Follows outpatient with Oncology  · Monitor CBC as needed    CAD (coronary artery disease), native coronary artery  Assessment & Plan  · Denies any chest pain  · Continue asa and statin therapy    Essential hypertension  Assessment & Plan  · Blood pressure was elevated on presentation with systolic in the 312F  · Still elevated but improved since admit  · Continue home carvedilol, enalapril, lasix, HCTZ  · Monitor vitals per routine      VTE Pharmacologic Prophylaxis:   Pharmacologic: Enoxaparin (Lovenox)  Mechanical VTE Prophylaxis in Place: Yes    Patient Centered Rounds: I have performed bedside rounds with nursing staff today  Education and Discussions with Family / Patient: patient    Time Spent for Care: 30 minutes  More than 50% of total time spent on counseling and coordination of care as described above  Current Length of Stay: 1 day(s)    Current Patient Status: Inpatient   Certification Statement: The patient will continue to require additional inpatient hospital stay due to needs monitoring for copd on steroids    Discharge Plan: likely tomorrow    Code Status: Level 3 - DNAR and DNI      Subjective:   Overall much improved  Reports her shortness of breath/wheezing significantly better  Energy levels are improved  Blood pressure slightly elevated with systolic in the 867B currently [overall improved from systolic in 458H yesterday]  Objective:     Vitals:   Temp (24hrs), Av 9 °F (36 6 °C), Min:97 6 °F (36 4 °C), Max:98 1 °F (36 7 °C)    Temp:  [97 6 °F (36 4 °C)-98 1 °F (36 7 °C)] 97 6 °F (36 4 °C)  HR:  [] 102  Resp:  [16] 16  BP: (173-196)/() 173/97  SpO2:  [90 %-99 %] 90 %  Body mass index is 17 33 kg/m²  Input and Output Summary (last 24 hours): Intake/Output Summary (Last 24 hours) at 3/12/2023 1029  Last data filed at 3/12/2023 0118  Gross per 24 hour   Intake 730 ml   Output 1300 ml   Net -570 ml       Physical Exam:     Physical Exam  Constitutional:       General: She is not in acute distress  Comments: Frail in appearance   HENT:      Mouth/Throat:      Mouth: Mucous membranes are moist       Pharynx: Oropharynx is clear  Cardiovascular:      Rate and Rhythm: Normal rate and regular rhythm  Pulses: Normal pulses  Pulmonary:      Effort: Pulmonary effort is normal  No respiratory distress  Breath sounds: Wheezing present  Abdominal:      General: Abdomen is flat  Bowel sounds are normal       Palpations: Abdomen is soft  Neurological:      Mental Status: She is alert and oriented to person, place, and time  Additional Data:     Labs:    Results from last 7 days   Lab Units 03/12/23  0616 03/11/23  0506 03/10/23  2003 03/07/23  0549 03/06/23  1019   WBC Thousand/uL 18 34*   < > 17 93*   < > 10 15   HEMOGLOBIN g/dL 9 1*   < > 8 9*   < > 8 1*   HEMATOCRIT % 29 3*   < > 29 3*   < > 26 6*   PLATELETS Thousands/uL 318   < > 267   < > 184   NEUTROS PCT %  --   --   --   --  43   LYMPHS PCT %  --   --   --   --  47*   LYMPHO PCT %  --   --  40  --   --    MONOS PCT %  --   --   --   --  9   MONO PCT %  --   --  6  --   --    EOS PCT %  --   --  2  --  1    < > = values in this interval not displayed  Results from last 7 days   Lab Units 03/12/23  0616 03/11/23  0506   SODIUM mmol/L 140 140   POTASSIUM mmol/L 3 8 4 1   CHLORIDE mmol/L 99 101   CO2 mmol/L 32 33*   BUN mg/dL 23 17   CREATININE mg/dL 0 82 0 65   ANION GAP mmol/L 9 6   CALCIUM mg/dL 9 8 9 2   ALBUMIN g/dL  --  3 6   TOTAL BILIRUBIN mg/dL  --  0 31   ALK PHOS U/L  --  58   ALT U/L  --  4*   AST U/L  --  8*   GLUCOSE RANDOM mg/dL 134 144*                 Results from last 7 days   Lab Units 03/11/23  0506 03/10/23  2343 03/07/23  0549 03/06/23  1019   LACTIC ACID mmol/L  --   --   --  0 5   PROCALCITONIN ng/ml <0 05 <0 05 <0 05 <0 05           * I Have Reviewed All Lab Data Listed Above  * Additional Pertinent Lab Tests Reviewed: All Labs Within Last 24 Hours Reviewed    Recent Cultures (last 7 days):     Results from last 7 days   Lab Units 03/10/23  2343 03/06/23  1019   BLOOD CULTURE  Received in Microbiology Lab  Culture in Progress  Received in Microbiology Lab  Culture in Progress  No Growth After 5 Days  No Growth After 5 Days         Last 24 Hours Medication List:   Current Facility-Administered Medications   Medication Dose Route Frequency Provider Last Rate   • acetaminophen  650 mg Oral Q6H PRN Evan Nati, PA-C     • albuterol  2 puff Inhalation Q6H PRN Evan Damian PA-C     • aspirin  81 mg Oral Daily Margot Carlisle PA-C     • azithromycin  500 mg Intravenous Q24H Margot Carlisle PA-C     • benzonatate  100 mg Oral TID PRN Evan Damian PA-C     • budesonide-formoterol  2 puff Inhalation BID Margot Carlisle PA-C     • carvedilol  6 25 mg Oral BID With Meals Margot Carlisle PA-C     • enoxaparin  40 mg Subcutaneous Daily Margot Carlisle PA-C     • escitalopram  20 mg Oral Daily Margot Carlisle PA-C     • ferrous sulfate  325 mg Oral Daily With Breakfast Margot Carlisle PA-C     • furosemide  20 mg Oral Daily Margot Carlisle PA-C     • guaiFENesin  600 mg Oral BID Margot Carlisle PA-C     • hydrochlorothiazide  25 mg Oral Daily Margot Carlisle PA-C     • ipratropium  0 5 mg Nebulization TID Evan Damian PA-C     • levalbuterol  1 25 mg Nebulization TID June YAÑEZ MD     • lisinopril  20 mg Oral Daily Mary Fonseca MD     • melatonin  6 mg Oral HS Mary Fonseca MD     • nicotine  1 patch Transdermal Daily Margot Carlisle PA-C     • pravastatin  20 mg Oral Daily With [employer], PA-C     • predniSONE  60 mg Oral Daily Mary Fonseca MD     • vilazodone  40 mg Oral Daily With Breakfast Margot Carlisle PA-C     • Zanubrutinib  160 mg Oral Q12H Hebert Valdovinos MD          Today, Patient Was Seen By: MILI Pickens      ** Please Note: Dictation voice to text software may have been used in the creation of this document   **

## 2023-03-12 NOTE — ASSESSMENT & PLAN NOTE
· Blood pressure was elevated on presentation with systolic in the 646A  · Still elevated but improved since admit  · Continue home carvedilol, enalapril, lasix, HCTZ  · Monitor vitals per routine

## 2023-03-12 NOTE — RESPIRATORY THERAPY NOTE
RT Protocol Note  Johana Szymanski 69 y o  female MRN: [id number]  Unit/Bed#: -01 Encounter: [id number]    Assessment    Principal Problem:    COPD with acute exacerbation ([place] [address] )  Active Problems:    Essential hypertension    CAD (coronary artery disease), native coronary artery    SIRS (systemic inflammatory response syndrome) ([facility])    Anemia    Waldenstrom macroglobulinemia ([facility])      Home Pulmonary Medications:  symbicort/albuterol  Home Devices/Therapy: Home O2    Past Medical History:   Diagnosis Date   • Acute congestive heart failure ([place] [address] ) 8/27/2021   • Anxiety    • Cardiac disease    • Chest pain 8/27/2021   • Chronic pain disorder    • COPD (chronic obstructive pulmonary disease) ([facility])    • Depression    • Heart disease    • Hyperlipidemia    • Hypertension    • MI (myocardial infarction) ([place] [address] )    • MRSA (methicillin resistant Staphylococcus aureus)    • Renal disorder     benign kidney tumor     Social History     Socioeconomic History   • Marital status:      Spouse name: None   • Number of children: 3   • Years of education: 15   • Highest education level: High school graduate   Occupational History   • Occupation: [address]     Employer: [employer]     Comment: retired    Tobacco Use   • Smoking status: Every Day     Packs/day: 1 00     Years: 60 00     Pack years: 60 00     Types: Cigarettes   • Smokeless tobacco: Never   • Tobacco comments:     She has close to a 60 pack-year smoking history, most recently has cut down to 4-5 cigarettes daily   Vaping Use   • Vaping Use: Never used   Substance and Sexual Activity   • Alcohol use: Never   • Drug use: No   • Sexual activity: Not Currently   Other Topics Concern   • None   Social History Narrative   • None     Social Determinants of Health     Financial Resource Strain: Not on file   Food Insecurity: Not on file   Transportation Needs: Not on file   Physical Activity: Not on file   Stress: Not on file   Social Connections: Not on file   Intimate Partner Violence: Not on file   Housing Stability: Not on file       Subjective    Subjective Data: continue with scheduled nebs    Objective    Physical Exam:        Vitals:  Blood pressure (!) 173/97, pulse 102, temperature 97 6 °F (36 4 °C), resp  rate 16, height 5' 7" (1 702 m), weight 50 2 kg (110 lb 10 7 oz), SpO2 90 %, not currently breastfeeding  Imaging and other studies: I have personally reviewed pertinent reports        O2 Device: NC     Plan    Respiratory Plan: Home Bronchodilator Patient pathway  Airway Clearance Plan: Incentive Spirometer     Resp Comments: will continue with current tx

## 2023-03-12 NOTE — CASE MANAGEMENT
Case Management Assessment & Discharge Planning Note    Patient name Ishaan Garsia  Location [address] 319/-99 MRN [id number]  : 1945 Date 3/12/2023       Current Admission Date: 3/10/2023  Current Admission Diagnosis:COPD with acute exacerbation [facility])   Patient Active Problem List    Diagnosis Date Noted   • Waldenstrom macroglobulinemia ([address] ) 2023   • Severe protein-calorie malnutrition ([address] ) 2021   • Positive blood culture 2021   • COPD with acute exacerbation ([address] ) 2021   • SIRS (systemic inflammatory response syndrome) ([address] ) 2021   • Anemia 2021   • Abnormal computed tomography angiography (CTA) 2021   • Fatigue 2021   • Major depressive disorder, recurrent episode, moderate ([address] ) 2019   • Flank pain 2019   • Pneumonia 2019   • Sepsis ([address] ) 2019   • CAD (coronary artery disease), native coronary artery 2018   • Chronic respiratory failure with hypoxia ([address] ) 2018   • Ambulatory dysfunction 2018   • Hypokalemia 2018   • Essential hypertension 2018   • Tobacco abuse 2018      LOS (days): 1  Geometric Mean LOS (GMLOS) (days):   Days to GMLOS:     OBJECTIVE:    Risk of Unplanned Readmission Score: 22 21         Current admission status: Inpatient       Preferred Pharmacy:   [facility]/pharmacy [address] [address] [address] [place] [address]  Phone: [phone] Fax: [phone]    Primary Care Provider: Julita Dutton MD    Primary Insurance: MEDICARE  Secondary [place]    ASSESSMENT:  [facility], [address] [place] [address] Phone: [phone] (Home)               Advance Directives  Does patient have a [address]?: Yes  Was patient offered paperwork?: No  Does patient currently have a Health Care decision maker?: Yes, please see Health Care Proxy section  Does patient have Advance Directives?: Yes  Advance Directives: Living will, Power of  for health care  Primary Contact: fadia Tomlinson         Readmission Root Cause  30 Day Readmission: No    Patient Information  Admitted from[de-identified] Home  Mental Status: Alert  During Assessment patient was accompanied by: Not accompanied during assessment  Assessment information provided by[de-identified] Patient  Primary Caregiver: Self  Support Systems: Son  [place] of Residence: [address] do you live in?: [place] entry access options   Select all that apply : Stairs  Number of steps to enter home : 6  Do the steps have railings?: Yes  Type of Current Residence: [address] home  Upon entering residence, is there a bedroom on the main floor (no further steps)?: Yes  Upon entering residence, is there a bathroom on the main floor (no further steps)?: Yes  In the last 12 months, how many places have you lived?: 2  In the last 12 months, was there a time when you did not have a steady place to sleep or slept in a shelter (including now)?: No  Homeless/housing insecurity resource given?: No  Living Arrangements: Lives w/ Son (lives with son Norma Tomlinson)  Is patient a ?: No    Activities of Daily Living Prior to Admission  Functional Status: Independent  Completes ADLs independently?: Yes  Ambulates independently?: Yes  Does patient use assisted devices?: Yes  Assisted Devices (DME) used: [address], Wheelchair, Home Oxygen concentrator, Portable Oxygen tanks, [address]  Electrochaea Name (respiratory supplies): [employer]  O2 Rate(s): 4 lpm  Does patient currently own DME?: Yes  What DME does the patient currently own?: Evette Al, [address], Wheelchair, Shower Chair  Does patient have a history of Outpatient Therapy (PT/OT)?: No  Does the patient have a history of Short-Term Rehab?: Yes (Kay)  Does patient have a history of HHC?: Yes  Does patient currently have [address]?: Yes ([facility])    [address] [place] [address]  Type of Current Home Care Services: Home health aide, Home PT, Home OT, Nurse visit  [address][de-identified] [address] Provider[de-identified] PCP    Patient Information Continued  Income Source: Unemployed  Does patient have prescription coverage?: Yes  Within the past 12 months, you worried that your food would run out before you got the money to buy more : Never true  Within the past 12 months, the food you bought just didn't last and you didn't have money to get more : Never true  Food insecurity resource given?: No  Does patient receive dialysis treatments?: No  Does patient have a history of substance abuse?: No  Does patient have a history of Mental Health Diagnosis?: No    PHQ 2/9 Screening   Reviewed PHQ 2/9 Depression Screening Score?: No    Means of Transportation  Means of Transport to Appts[de-identified] Family transport  In the past 12 months, has lack of transportation kept you from medical appointments or from getting medications?: No  In the past 12 months, has lack of transportation kept you from meetings, work, or from getting things needed for daily living?: No  Was application for public transport provided?: No        DISCHARGE DETAILS:    Discharge planning discussed with[de-identified] patient  Freedom of Choice: Yes  Comments - Freedom of Choice: Pt refusing STR    Wishes to be discharged home and resume services with Feli Eli CM contacted family/caregiver?: No- see comments (pt will update her son herself)  Were Treatment Team discharge recommendations reviewed with patient/caregiver?: Yes  Did patient/caregiver verbalize understanding of patient care needs?: Yes  Were patient/caregiver advised of the risks associated with not following Treatment Team discharge recommendations?: Yes    Contacts  Patient Contacts: Tiarra Brooks  Relationship to Patient[de-identified] [address]         Is the patient interested in [address] at discharge?: Yes  [address] requested[de-identified] [address], Nursing, Occupational Therapy, Physical [address] Name[de-identified] [facility] Provider[de-identified] PCP  Home Health Services Needed[de-identified] COPD Management, Evaluate Functional Status and Safety, Gait/ADL Training, Oxygen Via Nasal Cannula, Strengthening/Theraputic Exercises to Improve Function  Oxygen LPM Ordered (if applicable based on home health services needed):: 4 LPM  Homebound Criteria Met[de-identified] Requires the Assistance of Another Person for Safe Ambulation or to Leave the Home, Uses an Assist Device (i e  cane, walker, etc)  Supporting Clincal Findings[de-identified] Fatigues Easliy in Short Distances, Dyspnea with Exertion, Requires Oxygen, Limited Endurance    DME Referral Provided  Referral made for DME?: No    Other Referral/Resources/Interventions Provided:  Interventions: [facility]  Referral Comments: Refusing STR-wishes to be discharged home and resume services with Lexa Asencio    Would you like to participate in our [address] service program?  : No - Declined    Treatment Team Recommendation: Home with [facility]  Discharge Destination Plan[de-identified] Home with Margo at Discharge : Family

## 2023-03-13 ENCOUNTER — APPOINTMENT (INPATIENT)
Dept: RADIOLOGY | Facility: HOSPITAL | Age: 78
End: 2023-03-13

## 2023-03-13 VITALS
SYSTOLIC BLOOD PRESSURE: 159 MMHG | HEART RATE: 92 BPM | BODY MASS INDEX: 17.37 KG/M2 | WEIGHT: 110.67 LBS | DIASTOLIC BLOOD PRESSURE: 97 MMHG | RESPIRATION RATE: 24 BRPM | HEIGHT: 67 IN | OXYGEN SATURATION: 97 % | TEMPERATURE: 97.8 F

## 2023-03-13 LAB
ANION GAP SERPL CALCULATED.3IONS-SCNC: 6 MMOL/L (ref 4–13)
ANISOCYTOSIS BLD QL SMEAR: PRESENT
BASOPHILS # BLD MANUAL: 0 THOUSAND/UL (ref 0–0.1)
BASOPHILS NFR MAR MANUAL: 0 % (ref 0–1)
BUN SERPL-MCNC: 30 MG/DL (ref 5–25)
CALCIUM SERPL-MCNC: 9.4 MG/DL (ref 8.4–10.2)
CHLORIDE SERPL-SCNC: 99 MMOL/L (ref 96–108)
CO2 SERPL-SCNC: 36 MMOL/L (ref 21–32)
CREAT SERPL-MCNC: 1.01 MG/DL (ref 0.6–1.3)
EOSINOPHIL # BLD MANUAL: 0 THOUSAND/UL (ref 0–0.4)
EOSINOPHIL NFR BLD MANUAL: 0 % (ref 0–6)
ERYTHROCYTE [DISTWIDTH] IN BLOOD BY AUTOMATED COUNT: 14.6 % (ref 11.6–15.1)
GFR SERPL CREATININE-BSD FRML MDRD: 54 ML/MIN/1.73SQ M
GLUCOSE SERPL-MCNC: 75 MG/DL (ref 65–140)
HCT VFR BLD AUTO: 28.7 % (ref 34.8–46.1)
HGB BLD-MCNC: 8.9 G/DL (ref 11.5–15.4)
LYMPHOCYTES # BLD AUTO: 17.38 THOUSAND/UL (ref 0.6–4.47)
LYMPHOCYTES # BLD AUTO: 66 % (ref 14–44)
MCH RBC QN AUTO: 30.7 PG (ref 26.8–34.3)
MCHC RBC AUTO-ENTMCNC: 31 G/DL (ref 31.4–37.4)
MCV RBC AUTO: 99 FL (ref 82–98)
MONOCYTES # BLD AUTO: 0 THOUSAND/UL (ref 0–1.22)
MONOCYTES NFR BLD: 0 % (ref 4–12)
NEUTROPHILS # BLD MANUAL: 8.95 THOUSAND/UL (ref 1.85–7.62)
NEUTS BAND NFR BLD MANUAL: 1 % (ref 0–8)
NEUTS SEG NFR BLD AUTO: 33 % (ref 43–75)
PLATELET # BLD AUTO: 296 THOUSANDS/UL (ref 149–390)
PLATELET BLD QL SMEAR: ADEQUATE
PMV BLD AUTO: 9.8 FL (ref 8.9–12.7)
POTASSIUM SERPL-SCNC: 3.4 MMOL/L (ref 3.5–5.3)
RBC # BLD AUTO: 2.9 MILLION/UL (ref 3.81–5.12)
SODIUM SERPL-SCNC: 141 MMOL/L (ref 135–147)
WBC # BLD AUTO: 26.33 THOUSAND/UL (ref 4.31–10.16)

## 2023-03-13 RX ORDER — PREDNISONE 20 MG/1
TABLET ORAL
Qty: 29 TABLET | Refills: 0 | Status: SHIPPED | OUTPATIENT
Start: 2023-03-14 | End: 2023-04-03

## 2023-03-13 RX ORDER — POTASSIUM CHLORIDE 20 MEQ/1
40 TABLET, EXTENDED RELEASE ORAL ONCE
Status: COMPLETED | OUTPATIENT
Start: 2023-03-13 | End: 2023-03-13

## 2023-03-13 RX ORDER — ESCITALOPRAM OXALATE 10 MG/1
20 TABLET ORAL DAILY
Qty: 60 TABLET | Refills: 0 | Status: ON HOLD | OUTPATIENT
Start: 2023-03-13 | End: 2023-03-31

## 2023-03-13 RX ORDER — LEVALBUTEROL 1.25 MG/.5ML
1.25 SOLUTION, CONCENTRATE RESPIRATORY (INHALATION) EVERY 8 HOURS PRN
Qty: 30 EACH | Refills: 0 | Status: SHIPPED | OUTPATIENT
Start: 2023-03-13 | End: 2023-04-12

## 2023-03-13 RX ORDER — HYDROXYZINE HYDROCHLORIDE 25 MG/1
25 TABLET, FILM COATED ORAL ONCE
Status: COMPLETED | OUTPATIENT
Start: 2023-03-13 | End: 2023-03-13

## 2023-03-13 RX ADMIN — IPRATROPIUM BROMIDE 0.5 MG: 0.5 SOLUTION RESPIRATORY (INHALATION) at 08:30

## 2023-03-13 RX ADMIN — FUROSEMIDE 20 MG: 20 TABLET ORAL at 09:36

## 2023-03-13 RX ADMIN — ASPIRIN 81 MG: 81 TABLET, COATED ORAL at 09:36

## 2023-03-13 RX ADMIN — LEVALBUTEROL HYDROCHLORIDE 1.25 MG: 1.25 SOLUTION, CONCENTRATE RESPIRATORY (INHALATION) at 08:30

## 2023-03-13 RX ADMIN — BUDESONIDE AND FORMOTEROL FUMARATE DIHYDRATE 2 PUFF: 160; 4.5 AEROSOL RESPIRATORY (INHALATION) at 09:38

## 2023-03-13 RX ADMIN — ZANUBRUTINIB 160 MG: 80 CAPSULE, GELATIN COATED ORAL at 09:38

## 2023-03-13 RX ADMIN — PREDNISONE 60 MG: 20 TABLET ORAL at 09:36

## 2023-03-13 RX ADMIN — LISINOPRIL 20 MG: 20 TABLET ORAL at 09:36

## 2023-03-13 RX ADMIN — HYDROXYZINE HYDROCHLORIDE 25 MG: 25 TABLET ORAL at 00:26

## 2023-03-13 RX ADMIN — IPRATROPIUM BROMIDE 0.5 MG: 0.5 SOLUTION RESPIRATORY (INHALATION) at 14:02

## 2023-03-13 RX ADMIN — ENOXAPARIN SODIUM 40 MG: 40 INJECTION SUBCUTANEOUS at 09:35

## 2023-03-13 RX ADMIN — LEVALBUTEROL HYDROCHLORIDE 1.25 MG: 1.25 SOLUTION, CONCENTRATE RESPIRATORY (INHALATION) at 14:02

## 2023-03-13 RX ADMIN — POTASSIUM CHLORIDE 40 MEQ: 1500 TABLET, EXTENDED RELEASE ORAL at 08:00

## 2023-03-13 RX ADMIN — Medication 325 MG: at 08:00

## 2023-03-13 RX ADMIN — HYDROCHLOROTHIAZIDE 25 MG: 25 TABLET ORAL at 09:36

## 2023-03-13 RX ADMIN — GUAIFENESIN 600 MG: 600 TABLET ORAL at 09:36

## 2023-03-13 RX ADMIN — VILAZODONE HYDROCHLORIDE 40 MG: 20 TABLET ORAL at 08:00

## 2023-03-13 RX ADMIN — CARVEDILOL 6.25 MG: 6.25 TABLET, FILM COATED ORAL at 08:00

## 2023-03-13 RX ADMIN — ESCITALOPRAM OXALATE 20 MG: 10 TABLET, FILM COATED ORAL at 09:36

## 2023-03-13 NOTE — ASSESSMENT & PLAN NOTE
· Blood pressure was elevated on presentation with systolic in the [address]  · Improved since admission  · Continue home carvedilol, enalapril, lasix, HCTZ  · Monitor vitals per routine

## 2023-03-13 NOTE — CASE MANAGEMENT
Case Management Discharge Planning Note    Patient name Katerina Enriquez  Location [address] 319/-40 MRN [id number]  : 1945 Date 3/13/2023       Current Admission Date: 3/10/2023  Current Admission Diagnosis:COPD with acute exacerbation [facility])   Patient Active Problem List    Diagnosis Date Noted   • Waldenstrom macroglobulinemia ([facility] ) 2023   • Severe protein-calorie malnutrition ([address] ) 2021   • Positive blood culture 2021   • COPD with acute exacerbation ([address] ) 2021   • SIRS (systemic inflammatory response syndrome) ([facility] ) 2021   • Anemia 2021   • Abnormal computed tomography angiography (CTA) 2021   • Fatigue 2021   • Major depressive disorder, recurrent episode, moderate ([facility] ) 2019   • Flank pain 2019   • Pneumonia 2019   • Sepsis ([facility] ) 2019   • CAD (coronary artery disease), native coronary artery 2018   • Chronic respiratory failure with hypoxia ([facility] ) 2018   • Ambulatory dysfunction 2018   • Hypokalemia 2018   • Essential hypertension 2018   • Tobacco abuse 2018      LOS (days): 2  Geometric Mean LOS (GMLOS) (days): 2 70  Days to GMLOS:0 5     OBJECTIVE:  Risk of Unplanned Readmission Score: 25 35         Current admission status: Inpatient   Preferred Pharmacy:   [facility]/pharmacy [address] [place] [address] [address]  Phone: [phone] Fax: [phone]    Primary Care Provider: Tori White MD    Primary Insurance: MEDICARE  [place]    DISCHARGE DETAILS:     CM reserved VNA services for patient       [address]   [facility] [address] [place] [address]  Phone: [phone]  Fax: [phone]

## 2023-03-13 NOTE — ASSESSMENT & PLAN NOTE
· Meeting SIRS criteria on admission due to tachycardia, tachypnea, leukocytosis  · Here for COPD exacerbation  · CXR negative for pneumonia  · Flu RSV COVID PCR negative  · Procalcitonin x2 negative  · S/p 3 days azithromycin due to COPD exacerbation  · Please note white blood cell count was elevated today 26,000 likely related to steroids repeat chest x-ray ordered which was negative for acute process  Patient without any significant complaints and reports feeling back to her baseline today  Recommended patient repeat her CBC in 2 days and follow-up outpatient with her oncologist/PCP  She is agreeable

## 2023-03-13 NOTE — PLAN OF CARE
Problem: Nutrition/Hydration-ADULT  Goal: Nutrient/Hydration intake appropriate for improving, restoring or maintaining nutritional needs  Description: Monitor and assess patient's nutrition/hydration status for malnutrition  Collaborate with interdisciplinary team and initiate plan and interventions as ordered  Monitor patient's weight and dietary intake as ordered or per policy  Utilize nutrition screening tool and intervene as necessary  Determine patient's food preferences and provide high-protein, high-caloric foods as appropriate       INTERVENTIONS:  - Monitor oral intake, urinary output, labs, and treatment plans  - Assess nutrition and hydration status and recommend course of action  - Evaluate amount of meals eaten  - Assist patient with eating if necessary   - Allow adequate time for meals  - Recommend/ encourage appropriate diets, oral nutritional supplements, and vitamin/mineral supplements  - Order, calculate, and assess calorie counts as needed  - Recommend, monitor, and adjust tube feedings and TPN/PPN based on assessed needs  - Assess need for intravenous fluids  - Provide specific nutrition/hydration education as appropriate  - Include patient/family/caregiver in decisions related to nutrition  Outcome: Progressing     Problem: RESPIRATORY - ADULT  Goal: Achieves optimal ventilation and oxygenation  Description: INTERVENTIONS:  - Assess for changes in respiratory status  - Assess for changes in mentation and behavior  - Position to facilitate oxygenation and minimize respiratory effort  - Oxygen administered by appropriate delivery if ordered  - Initiate smoking cessation education as indicated  - Encourage broncho-pulmonary hygiene including cough, deep breathe, Incentive Spirometry  - Assess the need for suctioning and aspirate as needed  - Assess and instruct to report SOB or any respiratory difficulty  - Respiratory Therapy support as indicated  Outcome: Progressing     Problem: SAFETY ADULT  Goal: Patient will remain free of falls  Description: INTERVENTIONS:  - Educate patient/family on patient safety including physical limitations  - Instruct patient to call for assistance with activity   - Consult OT/PT to assist with strengthening/mobility   - Keep Call bell within reach  - Keep bed low and locked with side rails adjusted as appropriate  - Keep care items and personal belongings within reach  - Initiate and maintain comfort rounds  - Make Fall Risk Sign visible to staff  - Offer Toileting every 2 Hours, in advance of need  - Initiate/Maintain bed alarm  - Obtain necessary fall risk management equipment  - Apply yellow socks and bracelet for high fall risk patients  - Consider moving patient to room near nurses station  Outcome: Progressing  Goal: Maintain or return to baseline ADL function  Description: INTERVENTIONS:  -  Assess patient's ability to carry out ADLs; assess patient's baseline for ADL function and identify physical deficits which impact ability to perform ADLs (bathing, care of mouth/teeth, toileting, grooming, dressing, etc )  - Assess/evaluate cause of self-care deficits   - Assess range of motion  - Assess patient's mobility; develop plan if impaired  - Assess patient's need for assistive devices and provide as appropriate  - Encourage maximum independence but intervene and supervise when necessary  - Involve family in performance of ADLs  - Assess for home care needs following discharge   - Consider OT consult to assist with ADL evaluation and planning for discharge  - Provide patient education as appropriate  Outcome: Progressing  Goal: Maintains/Returns to pre admission functional level  Description: INTERVENTIONS:  - Perform BMAT or MOVE assessment daily    - Set and communicate daily mobility goal to care team and patient/family/caregiver  - Collaborate with rehabilitation services on mobility goals if consulted  - Perform Range of Motion 4 times a day    - Reposition patient every 2 hours  - Dangle patient 3 times a day  - Stand patient 3 times a day  - Ambulate patient 3 times a day  - Out of bed to chair 3 times a day   - Out of bed for meals 3 times a day  - Out of bed for toileting  - Record patient progress and toleration of activity level   Outcome: Progressing     Problem: DISCHARGE PLANNING  Goal: Discharge to home or other facility with appropriate resources  Description: INTERVENTIONS:  - Identify barriers to discharge w/patient and caregiver  - Arrange for needed discharge resources and transportation as appropriate  - Identify discharge learning needs (meds, wound care, etc )  - Arrange for interpretive services to assist at discharge as needed  - Refer to Case Management Department for coordinating discharge planning if the patient needs post-hospital services based on physician/advanced practitioner order or complex needs related to functional status, cognitive ability, or social support system  Outcome: Progressing     Problem: Knowledge Deficit  Goal: Patient/family/caregiver demonstrates understanding of disease process, treatment plan, medications, and discharge instructions  Description: Complete learning assessment and assess knowledge base    Interventions:  - Provide teaching at level of understanding  - Provide teaching via preferred learning methods  Outcome: Progressing     Problem: MOBILITY - ADULT  Goal: Maintain or return to baseline ADL function  Description: INTERVENTIONS:  -  Assess patient's ability to carry out ADLs; assess patient's baseline for ADL function and identify physical deficits which impact ability to perform ADLs (bathing, care of mouth/teeth, toileting, grooming, dressing, etc )  - Assess/evaluate cause of self-care deficits   - Assess range of motion  - Assess patient's mobility; develop plan if impaired  - Assess patient's need for assistive devices and provide as appropriate  - Encourage maximum independence but intervene and supervise when necessary  - Involve family in performance of ADLs  - Assess for home care needs following discharge   - Consider OT consult to assist with ADL evaluation and planning for discharge  - Provide patient education as appropriate  Outcome: Progressing  Goal: Maintains/Returns to pre admission functional level  Description: INTERVENTIONS:  - Perform BMAT or MOVE assessment daily    - Set and communicate daily mobility goal to care team and patient/family/caregiver  - Collaborate with rehabilitation services on mobility goals if consulted  - Perform Range of Motion 4 times a day  - Reposition patient every 2 hours    - Dangle patient 3 times a day  - Stand patient 3 times a day  - Ambulate patient 3 times a day  - Out of bed to chair 3 times a day   - Out of bed for meals 3 times a day  - Out of bed for toileting  - Record patient progress and toleration of activity level   Outcome: Progressing

## 2023-03-13 NOTE — DISCHARGE SUMMARY
[address] [facility]  Discharge- Beth Wheat 1945, 69 y o  female MRN: [id number]  Unit/Bed#: -01 Encounter: [id number]  Primary Care Provider: Curtis Velazquez MD   Date and time admitted to hospital: 3/10/2023  7:53 PM    SIRS (systemic inflammatory response syndrome) ([facility] )  Assessment & Plan  · Meeting SIRS criteria on admission due to tachycardia, tachypnea, leukocytosis  · Here for COPD exacerbation  · CXR negative for pneumonia  · Flu RSV COVID PCR negative  · Procalcitonin x2 negative  · S/p 3 days azithromycin due to COPD exacerbation  · Please note white blood cell count was elevated today 26,000 likely related to steroids repeat chest x-ray ordered which was negative for acute process  Patient without any significant complaints and reports feeling back to her baseline today  Recommended patient repeat her CBC in 2 days and follow-up outpatient with her oncologist/PCP  She is agreeable  * COPD with acute exacerbation [facility])  Assessment & Plan  Patient presenting to the ED for worsening shortness of breath and weakness  associated productive cough  Patient was admitted for similar symptoms on 3/6/2023  Patient is a daily smoker  · Chronically on 4L NC at home  · Does not have additional oxygen requirements on admission  · Received IV solumedrol and nebulizers in the ED with minimal relief  · s/p IV Solu-Medrol - > transitioned to prednisone taper  · Symbicort inhaler/as needed albuterol inhaler  · Atrovent/Xopenex 3 times daily - cw prn xoponex neb at home  · Mucinex/Tessalon Perles    · Respiratory protocol      Waldenstrom macroglobulinemia ([facility] )  Assessment & Plan  · Outpatient follow up with Oncology    Anemia  Assessment & Plan  · Suspected in the setting of underlying malignancy  · No active signs of bleeding  · Follows outpatient with Oncology  · Monitor CBC as needed    CAD (coronary artery disease), native coronary artery  Assessment & Plan  · Denies any chest pain  · Continue asa and statin therapy    Essential hypertension  Assessment & Plan  · Blood pressure was elevated on presentation with systolic in the 569N  · Improved since admission  · Continue home carvedilol, enalapril, lasix, HCTZ  · Monitor vitals per routine      Discharging Physician / Practitioner: Pool Acuna MD  PCP: Karely Lou MD  Admission Date:   Admission Orders (From admission, onward)     Ordered        03/11/23 0848  Inpatient Admission  Once            03/10/23 2129  Place in Observation  Once                      Discharge Date: 03/13/23    Disposition:      Home with home health    Reason for Admission: shortness of breath    Discharge Diagnoses:     Please see assessment and plan section above for further details regarding discharge diagnoses  Medical Problems     Resolved Problems  Date Reviewed: 3/10/2023   None     ·     Medication Adjustments and Discharge Medications:  · Summary of Medication Adjustments made as a result of this hospitalization: see med rec  · Medication Dosing Tapers - Please refer to Discharge Medication List for details on any medication dosing tapers (if applicable to patient)  · Medications being temporarily held (include recommended restart time): see med rec  · Discharge Medication List: See after visit summary for reconciled discharge medications  Diet Recommendations at Discharge:  Diet -        Diet Orders   (From admission, onward)             Start     Ordered    03/11/23 1118  Dietary nutrition supplements  Once        Question Answer Comment   Select Supplement: Ensure Plus High Protein Chocolate    Frequency Breakfast        03/11/23 1118    03/10/23 2147  Diet Regular; Regular House  Diet effective now        References:    Nutrtion Support Algorithm Enteral vs  Parenteral   Question Answer Comment   Diet Type Regular    Regular Regular House    RD to adjust diet per protocol?  Yes        03/10/23 2147 Outpatient Tests Requested:  · OP CBC in 2-3 days    Hospital Course:     Beth Wheat is a 69 y o  female patient who originally presented to the hospital on 3/10/2023 with past medical history of chronic hypoxic respiratory failure secondary to COPD/CAD/hypertension/Ronnie Adiel macroglobulinemia/chronic anemia presenting to the ED with symptoms of worsening shortness of breath/weakness  Denied any associated productive cough  No fever/chills/chest pain  · Noted to meet SIRS criteria on admission due to tachycardia tachypnea/leukocytosis  Admitted for COPD exacerbation  2 chest x-rays negative for pneumonia  Flu RSV COVID PCR negative  Procalcitonin x2 negative  Patient did receive 3 days of azithromycin due to COPD exacerbation  White count elevated 26,000 likely related to steroids at discharge  Patient without any significant complaints  Reports feeling back to her baseline  Recommended repeat CBC in 2 days and outpatient follow-up with oncology/PCP  · As regards her COPD exacerbation, she is on her baseline 3 to 4 L oxygen  She received IV steroids transition to prednisone taper  Continue Symbicort inhaler/as needed albuterol inhaler at discharge  As needed Xopenex at home  Continue Mucinex/Tessalon Perles  Outpatient follow-up with pulmonology  · Above plan of care discussed with patient at [facility] and she is in agreement with plan above  DC home with home health  Condition at Discharge: fair     Discharge Day Visit / Exam:     Vitals: Blood Pressure: 156/83 (03/13/23 0643)  Pulse: 92 (03/13/23 0643)  Temperature: 98 °F (36 7 °C) (03/13/23 0643)  Temp Source: Oral (03/10/23 1957)  Respirations: (!) 24 (03/13/23 5846)  Height: 5' 7" (170 2 cm) (03/11/23 1112)  Weight - Scale: 50 2 kg (110 lb 10 7 oz) (03/10/23 2210)  SpO2: 97 % (03/13/23 0830)  Exam:   Physical Exam  Constitutional:       General: She is not in acute distress  Appearance: She is not toxic-appearing     HENT: Mouth/Throat:      Mouth: Mucous membranes are moist       Pharynx: Oropharynx is clear  Cardiovascular:      Rate and Rhythm: Normal rate and regular rhythm  Pulses: Normal pulses  Pulmonary:      Effort: Pulmonary effort is normal  No respiratory distress  Breath sounds: Normal breath sounds  Abdominal:      General: Abdomen is flat  Bowel sounds are normal       Palpations: Abdomen is soft  Musculoskeletal:      Right lower leg: No edema  Left lower leg: No edema  Neurological:      General: No focal deficit present  Mental Status: She is alert and oriented to person, place, and time  Goals of Care Discussions:  · Code Status at Discharge: Level 3 - DNAR and DNI    Discharge instructions/Information to patient and family:   See after visit summary section titled Discharge Instructions for information provided to patient and family  Discharge Statement:  I spent 40 minutes discharging the patient  This time was spent on the day of discharge  I had direct contact with the patient on the day of discharge  Greater than 50% of the total time was spent examining patient, answering all patient questions, arranging and discussing plan of care with patient as well as directly providing post-discharge instructions  Additional time then spent on discharge activities      ** Please Note: This note has been constructed using a voice recognition system **

## 2023-03-13 NOTE — ASSESSMENT & PLAN NOTE
Patient presenting to the ED for worsening shortness of breath and weakness  associated productive cough  Patient was admitted for similar symptoms on 3/6/2023  Patient is a daily smoker  · Chronically on 4L NC at home  · Does not have additional oxygen requirements on admission  · Received IV solumedrol and nebulizers in the ED with minimal relief  · s/p IV Solu-Medrol - > transitioned to prednisone taper  · Symbicort inhaler/as needed albuterol inhaler  · Atrovent/Xopenex 3 times daily - cw prn xoponex neb at home  · Mucinex/Tessalon Perles    · Respiratory protocol

## 2023-03-15 LAB
ATRIAL RATE: 98 BPM
P AXIS: 70 DEGREES
PR INTERVAL: 124 MS
QRS AXIS: 72 DEGREES
QRSD INTERVAL: 84 MS
QT INTERVAL: 366 MS
QTC INTERVAL: 467 MS
T WAVE AXIS: 63 DEGREES
VENTRICULAR RATE: 98 BPM

## 2023-03-16 LAB
BACTERIA BLD CULT: NORMAL
BACTERIA BLD CULT: NORMAL

## 2023-03-28 NOTE — ED PROVIDER NOTES
History  Chief Complaint   Patient presents with   • Shortness of Breath     Pt arrived via ems from home with c/o sob  Hx copd  4L at home  1duo neb + albuterol given by ems      -year-old female presented emergency department for evaluation of breath  Patient arrives by EMS, shortness of breath, has history of COPD, uses fluids at baseline at home, and had been given some DuoNebs prior to arrival by EMS, states that her symptoms are somewhat improved but she still is symptomatic here  Prior to Admission Medications   Prescriptions Last Dose Informant Patient Reported? Taking? Calcium-Magnesium-Vitamin D (CALCIUM 500 PO)   Yes No   Sig: Take 1,000 mg by mouth daily   HYDROcodone-acetaminophen (NORCO) 5-325 mg per tablet   Yes No   Sig: Take 1 tablet by mouth every 6 (six) hours as needed for pain   Zanubrutinib 80 MG CAPS   Yes Yes   Sig: Take 160 mg by mouth 2 (two) times a day   albuterol (VENTOLIN HFA) 90 mcg/act inhaler   No No   Sig: Inhale 2 puffs every 6 (six) hours as needed for wheezing   aspirin (ECOTRIN LOW STRENGTH) 81 mg EC tablet   No No   Sig: Take 1 tablet (81 mg total) by mouth daily   benzonatate (TESSALON PERLES) 100 mg capsule   No No   Sig: Take 1 capsule (100 mg total) by mouth 3 (three) times a day as needed for cough   budesonide-formoterol (SYMBICORT) 160-4 5 mcg/act inhaler   No No   Sig: Inhale 2 puffs 2 (two) times a day Rinse mouth after use     carvedilol (COREG) 6 25 mg tablet   No No   Sig: Take 1 tablet (6 25 mg total) by mouth 2 (two) times a day with meals   enalapril (VASOTEC) 5 mg tablet   No No   Sig: Take 1 tablet (5 mg total) by mouth 2 (two) times a day   escitalopram (LEXAPRO) 10 mg tablet   No No   Sig: Take 1 tablet (10 mg total) by mouth daily   Patient taking differently: Take 20 mg by mouth daily    ferrous sulfate 325 (65 Fe) mg tablet   Yes No   Sig: Take 325 mg by mouth daily with breakfast   furosemide (LASIX) 20 mg tablet   No No   Sig: Take 1 tablet (20 mg total) by mouth daily   guaiFENesin (MUCINEX) 600 mg 12 hr tablet   No No   Sig: Take 1 tablet (600 mg total) by mouth 2 (two) times a day   hydrochlorothiazide (HYDRODIURIL) 25 mg tablet   No No   Sig: Take 1 tablet (25 mg total) by mouth daily   melatonin 3 mg   No No   Sig: Take 1 tablet (3 mg total) by mouth daily at bedtime as needed (insomnia)   nicotine (NICODERM CQ) 21 mg/24 hr TD 24 hr patch   No No   Sig: Place 1 patch on the skin over 24 hours daily Do not start before March 8, 2023  nitroglycerin (NITROSTAT) 0 4 mg SL tablet   Yes No   Sig: Place 0 4 mg under the tongue every 5 (five) minutes as needed for chest pain   pravastatin (PRAVACHOL) 20 mg tablet   No No   Sig: Take 1 tablet (20 mg total) by mouth daily with dinner   predniSONE 20 mg tablet   No No   Sig: Take 2 tablets (40 mg total) by mouth daily for 2 days, THEN 1 tablet (20 mg total) daily for 2 days  sodium chloride 0 9 % nebulizer solution   No No   Sig: Take 3 mL by nebulization 3 (three) times a day   vilazodone (Viibryd) 40 mg tablet   Yes No   Sig: Take 40 mg by mouth daily with breakfast      Facility-Administered Medications: None       Past Medical History:   Diagnosis Date   • Acute congestive heart failure (HCC) 8/27/2021   • Anxiety    • Cardiac disease    • Chest pain 8/27/2021   • Chronic pain disorder    • COPD (chronic obstructive pulmonary disease) (HCC)    • Depression    • Heart disease    • Hyperlipidemia    • Hypertension    • MI (myocardial infarction) ([facility] [address] )    • MRSA (methicillin resistant Staphylococcus aureus)    • Renal disorder     benign kidney tumor       Past Surgical History:   Procedure Laterality Date   • APPENDECTOMY     • ELBOW BURSA SURGERY Left 02/2018    D/T MRSA INFECTION   • SPINAL FUSION      L7 L9       Family History   Problem Relation Age of Onset   • Heart disease Mother    • Cancer Father      I have reviewed and agree with the history as documented      E-Cigarette/Vaping   • E-Cigarette Use Never User      E-Cigarette/Vaping Substances   • Nicotine No    • THC No    • CBD No    • Flavoring No    • Other No    • Unknown No      Social History     Tobacco Use   • Smoking status: Every Day     Packs/day: 1 00     Years: 60 00     Pack years: 60 00     Types: Cigarettes   • Smokeless tobacco: Never   • Tobacco comments:     She has close to a 60 pack-year smoking history, most recently has cut down to 4-5 cigarettes daily   Vaping Use   • Vaping Use: Never used   Substance Use Topics   • Alcohol use: Never   • Drug use: No       Review of Systems   Constitutional: Negative for appetite change, chills, fatigue and fever  HENT: Negative for sneezing and sore throat  Eyes: Negative for visual disturbance  Respiratory: Positive for cough, shortness of breath and wheezing  Negative for choking and chest tightness  Cardiovascular: Negative for chest pain and palpitations  Gastrointestinal: Negative for abdominal pain, constipation, diarrhea, nausea and vomiting  Genitourinary: Negative for difficulty urinating and dysuria  Neurological: Negative for dizziness, weakness, light-headedness, numbness and headaches  All other systems reviewed and are negative  Physical Exam  Physical Exam  Constitutional:       General: She is not in acute distress  Appearance: She is well-developed  She is not diaphoretic  HENT:      Head: Normocephalic and atraumatic  Eyes:      Pupils: Pupils are equal, round, and reactive to light  Neck:      Vascular: No JVD  Trachea: No tracheal deviation  Cardiovascular:      Rate and Rhythm: Normal rate and regular rhythm  Heart sounds: Normal heart sounds  No murmur heard  No friction rub  No gallop  Pulmonary:      Effort: Pulmonary effort is normal  Tachypnea present  No respiratory distress  Breath sounds: Decreased breath sounds and wheezing present  No rales     Abdominal:      General: Bowel sounds are normal  There is no distension  Palpations: Abdomen is soft  Tenderness: There is no abdominal tenderness  There is no guarding or rebound  Skin:     General: Skin is warm and dry  Coloration: Skin is not pale  Neurological:      Mental Status: She is alert and oriented to person, place, and time  Cranial Nerves: No cranial nerve deficit  Motor: No abnormal muscle tone     Psychiatric:         Behavior: Behavior normal          Vital Signs  ED Triage Vitals   Temperature Pulse Respirations Blood Pressure SpO2   03/10/23 1957 03/10/23 1957 03/10/23 1957 03/10/23 1957 03/10/23 1957   97 9 °F (36 6 °C) 99 22 (!) 195/92 91 %      Temp Source Heart Rate Source Patient Position - Orthostatic VS BP Location FiO2 (%)   03/10/23 1957 03/10/23 1957 03/10/23 1957 03/10/23 1957 --   Oral Monitor Sitting Right arm       Pain Score       03/10/23 2300       No Pain           Vitals:    03/12/23 1500 03/12/23 2225 03/13/23 0643 03/13/23 1449   BP: 154/77 166/99 156/83 159/97   Pulse: 101 (!) 112 92    Patient Position - Orthostatic VS:             Visual Acuity      ED Medications  Medications   albuterol inhalation solution 5 mg (5 mg Nebulization Given 3/10/23 2003)   ipratropium (ATROVENT) 0 02 % inhalation solution 0 5 mg (0 5 mg Nebulization Given 3/10/23 2003)   methylPREDNISolone sodium succinate (Solu-MEDROL) injection 125 mg (125 mg Intravenous Given 3/10/23 2003)   albuterol (FOR EMS ONLY) (2 5 mg/3 mL) 0 083 % inhalation solution 2 5 mg (0 mg Does not apply Given to EMS 3/10/23 1958)   ipratropium-albuterol (FOR EMS ONLY) (DUO-NEB) 0 5-2 5 mg/3 mL inhalation solution 3 mL (0 mL Does not apply Given to EMS 3/10/23 1958)   azithromycin (ZITHROMAX) 500 mg in sodium chloride 0 9% 250mL IVPB 500 mg (0 mg Intravenous Stopped 3/12/23 2330)   hydrOXYzine HCL (ATARAX) tablet 25 mg (25 mg Oral Given 3/13/23 0026)   potassium chloride (K-DUR,KLOR-CON) CR tablet 40 mEq (40 mEq Oral Given 3/13/23 0800)       Diagnostic Studies  Results Reviewed     Procedure Component Value Units Date/Time    Blood culture [[id number]] Collected: 03/10/23 2343    Lab Status: Final result Specimen: Blood from Arm, Right Updated: 03/16/23 1201     Blood Culture No Growth After 5 Days  Blood culture [[id number]] Collected: 03/10/23 2343    Lab Status: Final result Specimen: Blood from Arm, Left Updated: 03/16/23 1201     Blood Culture No Growth After 5 Days      Procalcitonin, Next Day AM Collection [[id number]]  (Normal) Collected: 03/11/23 0506    Lab Status: Final result Specimen: Blood from Arm, Left Updated: 03/11/23 0550     Procalcitonin <0 05 ng/ml     HS Troponin I 4hr [[id number]]  (Normal) Collected: 03/11/23 0513    Lab Status: Final result Specimen: Blood from Arm, Left Updated: 03/11/23 0549     hs TnI 4hr 5 ng/L      Delta 4hr hsTnI -2 ng/L     Comprehensive metabolic panel [[id number]]  (Abnormal) Collected: 03/11/23 0506    Lab Status: Final result Specimen: Blood from Arm, Left Updated: 03/11/23 0541     Sodium 140 mmol/L      Potassium 4 1 mmol/L      Chloride 101 mmol/L      CO2 33 mmol/L      ANION GAP 6 mmol/L      BUN 17 mg/dL      Creatinine 0 65 mg/dL      Glucose 144 mg/dL      Glucose, Fasting 144 mg/dL      Calcium 9 2 mg/dL      AST 8 U/L      ALT 4 U/L      Alkaline Phosphatase 58 U/L      Total Protein 6 8 g/dL      Albumin 3 6 g/dL      Total Bilirubin 0 31 mg/dL      eGFR 86 ml/min/1 73sq m     Narrative:      [facility] guidelines for Chronic Kidney Disease (CKD):   •  Stage 1 with normal or high GFR (GFR > 90 mL/min/1 73 square meters)  •  Stage 2 Mild CKD (GFR = 60-89 mL/min/1 73 square meters)  •  Stage 3A Moderate CKD (GFR = 45-59 mL/min/1 73 square meters)  •  Stage 3B Moderate CKD (GFR = 30-44 mL/min/1 73 square meters)  •  Stage 4 Severe CKD (GFR = 15-29 mL/min/1 73 square meters)  •  Stage 5 End Stage CKD (GFR <15 mL/min/1 73 square meters)  Note: GFR calculation is accurate only with a steady state creatinine    CBC (With Platelets) [[id number]]  (Abnormal) Collected: 03/11/23 0506    Lab Status: Final result Specimen: Blood from Arm, Left Updated: 03/11/23 0522     WBC 11 15 Thousand/uL      RBC 2 69 Million/uL      Hemoglobin 8 2 g/dL      Hematocrit 26 4 %      MCV 98 fL      MCH 30 5 pg      MCHC 31 1 g/dL      RDW 14 0 %      Platelets 393 Thousands/uL      MPV 9 7 fL     Procalcitonin [[id number]]  (Normal) Collected: 03/10/23 2343    Lab Status: Final result Specimen: Blood from Arm, Left Updated: 03/11/23 0031     Procalcitonin <0 05 ng/ml     HS Troponin I 2hr [[id number]]  (Normal) Collected: 03/10/23 2343    Lab Status: Final result Specimen: Blood from Arm, Left Updated: 03/11/23 0028     hs TnI 2hr 7 ng/L      Delta 2hr hsTnI 0 ng/L     FLU/RSV/COVID - if FLU/RSV clinically relevant [[id number]]  (Normal) Collected: 03/10/23 2132    Lab Status: Final result Specimen: Nares from Nose Updated: 03/10/23 2220     SARS-CoV-2 Negative     INFLUENZA A PCR Negative     INFLUENZA B PCR Negative     RSV PCR Negative    Narrative:      FOR PEDIATRIC PATIENTS - copy/paste COVID Guidelines URL to browser: [email or web address]    SARS-CoV-2 assay is a Nucleic Acid Amplification assay intended for the  qualitative detection of nucleic acid from SARS-CoV-2 in nasopharyngeal  swabs  Results are for the presumptive identification of SARS-CoV-2 RNA  Positive results are indicative of infection with SARS-CoV-2, the virus  causing COVID-19, but do not rule out bacterial infection or co-infection  with other viruses  Laboratories within the [place] and its  territories are required to report all positive results to the appropriate  public health authorities  Negative results do not preclude SARS-CoV-2  infection and should not be used as the sole basis for treatment or other  patient management decisions   Negative results must be combined with  clinical observations, patient history, and epidemiological information  This test has not been FDA cleared or approved  This test has been authorized by FDA under an Emergency Use Authorization  (EUA)  This test is only authorized for the duration of time the  declaration that circumstances exist justifying the authorization of the  emergency use of an in vitro diagnostic tests for detection of SARS-CoV-2  virus and/or diagnosis of COVID-19 infection under section 564(b)(1) of  the Act, 21 U  S C  695HFU-3(S)(6), unless the authorization is terminated  or revoked sooner  The test has been validated but independent review by FDA  and CLIA is pending  Test performed using [employer] GeneXpert: This RT-PCR assay targets N2,  a region unique to SARS-CoV-2  A conserved region in the E-gene was chosen  for pan-Sarbecovirus detection which includes SARS-CoV-2  According to CMS-2020-01-R, this platform meets the definition of high-throughput technology  CBC and differential [[id number]]  (Abnormal) Collected: 03/10/23 2003    Lab Status: Final result Specimen: Blood from Arm, Left Updated: 03/10/23 2056     WBC 17 93 Thousand/uL      RBC 2 87 Million/uL      Hemoglobin 8 9 g/dL      Hematocrit 29 3 %       fL      MCH 31 0 pg      MCHC 30 4 g/dL      RDW 14 0 %      MPV 9 8 fL      Platelets 212 Thousands/uL     Narrative: This is an appended report  These results have been appended to a previously verified report      Manual Differential(PHLEBS Do Not Order) [[id number]]  (Abnormal) Collected: 03/10/23 2003    Lab Status: Final result Specimen: Blood from Arm, Left Updated: 03/10/23 2056     Segmented % 52 %      Lymphocytes % 40 %      Monocytes % 6 %      Eosinophils, % 2 %      Basophils % 0 %      Absolute Neutrophils 9 32 Thousand/uL      Lymphocytes Absolute 7 17 Thousand/uL      Monocytes Absolute 1 08 Thousand/uL      Eosinophils Absolute 0 36 Thousand/uL      Basophils Absolute 0 00 Thousand/uL      Total Counted --     RBC Morphology Present     Platelet Estimate Adequate    HS Troponin 0hr (reflex protocol) [[id number]]  (Normal) Collected: 03/10/23 2003    Lab Status: Final result Specimen: Blood from Arm, Left Updated: 03/10/23 2041     hs TnI 0hr 7 ng/L     D-dimer, quantitative [[id number]]  (Normal) Collected: 03/10/23 2003    Lab Status: Final result Specimen: Blood from Arm, Left Updated: 03/10/23 2041     D-Dimer, Quant 0 37 ug/ml FEU     Narrative: In the evaluation for possible pulmonary embolism, in the appropriate (Well's Score of 4 or less) patient, the age adjusted d-dimer cutoff for this patient can be calculated as:    Age x 0 01 (in ug/mL) for Age-adjusted D-dimer exclusion threshold for a patient over 50 years      Comprehensive metabolic panel [[id number]]  (Abnormal) Collected: 03/10/23 2003    Lab Status: Final result Specimen: Blood from Arm, Left Updated: 03/10/23 2032     Sodium 141 mmol/L      Potassium 3 9 mmol/L      Chloride 100 mmol/L      CO2 36 mmol/L      ANION GAP 5 mmol/L      BUN 18 mg/dL      Creatinine 0 72 mg/dL      Glucose 104 mg/dL      Calcium 9 4 mg/dL      AST 9 U/L      ALT 4 U/L      Alkaline Phosphatase 69 U/L      Total Protein 6 9 g/dL      Albumin 3 8 g/dL      Total Bilirubin 0 30 mg/dL      eGFR 81 ml/min/1 73sq m     Narrative:      [facility] guidelines for Chronic Kidney Disease (CKD):   •  Stage 1 with normal or high GFR (GFR > 90 mL/min/1 73 square meters)  •  Stage 2 Mild CKD (GFR = 60-89 mL/min/1 73 square meters)  •  Stage 3A Moderate CKD (GFR = 45-59 mL/min/1 73 square meters)  •  Stage 3B Moderate CKD (GFR = 30-44 mL/min/1 73 square meters)  •  Stage 4 Severe CKD (GFR = 15-29 mL/min/1 73 square meters)  •  Stage 5 End Stage CKD (GFR <15 mL/min/1 73 square meters)  Note: GFR calculation is accurate only with a steady state creatinine                 XR chest portable   Final Result by Minh Beebe MD (03/13 1103)      No acute cardiopulmonary disease  Workstation performed: [id number]         XR chest 1 view portable   Final Result by Terra Al MD (03/11 5387)      No acute cardiopulmonary disease  Workstation performed: [id number]                    Procedures  Procedures         ED Course                                             Medical Decision Making  50-year-old female with COPD exacerbation, recently admitted for the same, in mild respiratory distress here, improving with nebs, will give Solu-Medrol as well, continue to titrate oxygen to affect, she is on 4 L chronically, likely) given increased work of breathing present on arrival here  Amount and/or Complexity of Data Reviewed  Labs: ordered  Risk  Prescription drug management  Decision regarding hospitalization  Disposition  Final diagnoses:   COPD exacerbation ([facility] [address] )     Time reflects when diagnosis was documented in both MDM as applicable and the Disposition within this note     Time User Action Codes Description Comment    3/10/2023  9:27 PM Oscar Izaguirre Add [J44 1] COPD exacerbation ([facility] [address] )     3/13/2023 11:13 AM Lexie Arellano Add [F33 1] Major depressive disorder, recurrent episode, moderate [facility])       ED Disposition     ED Disposition   Admit    Condition   Stable    Date/Time   Fri Mar 10, 2023  9:27 PM    Comment   Case was discussed with STEVE and the patient's admission status was agreed to be Admission Status: observation status to the service of Dr France Pope             Follow-up Information     Follow up With Specialties Details Why Contact Info    [address] [facility] Follow up [address] [facility] [address] TO Fax: [phone] [address]      Moreno Gandhi MD Internal Medicine Follow up in 1 week(s)  Calvin Craig [address] [place] [address]  [phone]            Discharge Medication List as of 3/13/2023 11:38 AM START taking these medications    Details   levalbuterol (XOPENEX) 1 25 mg/0 5 mL nebulizer solution Take 0 5 mL (1 25 mg total) by nebulization every 8 (eight) hours as needed for wheezing, Starting Mon 3/13/2023, Until Wed 4/12/2023 at 2359, Normal         CONTINUE these medications which have CHANGED    Details   escitalopram (LEXAPRO) 10 mg tablet Take 2 tablets (20 mg total) by mouth daily, Starting Mon 3/13/2023, Until Wed 4/12/2023, Normal      predniSONE 20 mg tablet Multiple Dosages:Starting Tue 3/14/2023, Until Wed 3/15/2023 at 2359, THEN Starting Thu 3/16/2023, Until Sat 3/18/2023 at 2359, THEN Starting Sun 3/19/2023, Until Tue 3/21/2023 at 2359, THEN Starting Wed 3/22/2023, Until Fri 3/24/2023 at 2359, THEN  Starting Sat 3/25/2023, Until Mon 3/27/2023 at 2359, THEN Starting Tue 3/28/2023, Until Thu 3/30/2023 at 2359Take 3 tablets (60 mg total) by mouth daily for 2 days, THEN 2 5 tablets (50 mg total) daily for 3 days, THEN 2 tablets (40 mg total) daily for  3 days, THEN 1 5 tablets (30 mg total) daily for 3 days, THEN 1 tablet (20 mg total) daily for 3 days, THEN 0 5 tablets (10 mg total) daily for 3 days   Do not start before March 14, 2023 , Normal         CONTINUE these medications which have NOT CHANGED    Details   Zanubrutinib 80 MG CAPS Take 160 mg by mouth 2 (two) times a day, Historical Med      albuterol (VENTOLIN HFA) 90 mcg/act inhaler Inhale 2 puffs every 6 (six) hours as needed for wheezing, Starting Tue 1/7/2020, Print      aspirin (ECOTRIN LOW STRENGTH) 81 mg EC tablet Take 1 tablet (81 mg total) by mouth daily, Starting Tue 8/31/2021, Until Thu 9/30/2021, Normal      benzonatate (TESSALON PERLES) 100 mg capsule Take 1 capsule (100 mg total) by mouth 3 (three) times a day as needed for cough, Starting Tue 3/7/2023, Normal      budesonide-formoterol (SYMBICORT) 160-4 5 mcg/act inhaler Inhale 2 puffs 2 (two) times a day Rinse mouth after use , Starting Tue 1/7/2020, Print Calcium-Magnesium-Vitamin D (CALCIUM 500 PO) Take 1,000 mg by mouth daily, Historical Med      carvedilol (COREG) 6 25 mg tablet Take 1 tablet (6 25 mg total) by mouth 2 (two) times a day with meals, Starting Mon 8/30/2021, Until Wed 9/29/2021, Normal      enalapril (VASOTEC) 5 mg tablet Take 1 tablet (5 mg total) by mouth 2 (two) times a day, Starting Wed 1/8/2020, Print      ferrous sulfate 325 (65 Fe) mg tablet Take 325 mg by mouth daily with breakfast, Historical Med      furosemide (LASIX) 20 mg tablet Take 1 tablet (20 mg total) by mouth daily, Starting Tue 8/31/2021, Until Thu 9/30/2021, Normal      guaiFENesin (MUCINEX) 600 mg 12 hr tablet Take 1 tablet (600 mg total) by mouth 2 (two) times a day, Starting Sun 12/23/2018, Normal      hydrochlorothiazide (HYDRODIURIL) 25 mg tablet Take 1 tablet (25 mg total) by mouth daily, Starting Tue 1/7/2020, Print      HYDROcodone-acetaminophen (NORCO) 5-325 mg per tablet Take 1 tablet by mouth every 6 (six) hours as needed for pain, Historical Med      melatonin 3 mg Take 1 tablet (3 mg total) by mouth daily at bedtime as needed (insomnia), Starting Tue 1/7/2020, Normal      nicotine (NICODERM CQ) 21 mg/24 hr TD 24 hr patch Place 1 patch on the skin over 24 hours daily Do not start before March 8, 2023 , Starting Wed 3/8/2023, Normal      nitroglycerin (NITROSTAT) 0 4 mg SL tablet Place 0 4 mg under the tongue every 5 (five) minutes as needed for chest pain, Historical Med      pravastatin (PRAVACHOL) 20 mg tablet Take 1 tablet (20 mg total) by mouth daily with dinner, Starting Mon 8/30/2021, Until Wed 9/29/2021, Normal      sodium chloride 0 9 % nebulizer solution Take 3 mL by nebulization 3 (three) times a day, Starting Sun 12/23/2018, No Print      vilazodone (Viibryd) 40 mg tablet Take 40 mg by mouth daily with breakfast, Historical Med                 PDMP Review       Value Time User    PDMP Reviewed  Yes 8/27/2021  3:13 AM Nay Perez PA-C          ED Provider  Electronically Signed by           Km Meadows MD  03/27/23 2059

## 2023-03-30 ENCOUNTER — APPOINTMENT (EMERGENCY)
Dept: CT IMAGING | Facility: HOSPITAL | Age: 78
End: 2023-03-30

## 2023-03-30 ENCOUNTER — APPOINTMENT (EMERGENCY)
Dept: RADIOLOGY | Facility: HOSPITAL | Age: 78
End: 2023-03-30

## 2023-03-30 ENCOUNTER — HOSPITAL ENCOUNTER (INPATIENT)
Facility: HOSPITAL | Age: 78
LOS: 3 days | Discharge: NURSING, REHAB OR LONG-TERM CARE | End: 2023-04-03
Attending: EMERGENCY MEDICINE | Admitting: INTERNAL MEDICINE

## 2023-03-30 DIAGNOSIS — R40.4 STARING EPISODES: ICD-10-CM

## 2023-03-30 DIAGNOSIS — R40.4 EPISODES OF STARING: Primary | ICD-10-CM

## 2023-03-30 DIAGNOSIS — R53.1 GENERALIZED WEAKNESS: ICD-10-CM

## 2023-03-30 DIAGNOSIS — J18.9 PNEUMONIA: ICD-10-CM

## 2023-03-30 PROBLEM — J44.9 COPD (CHRONIC OBSTRUCTIVE PULMONARY DISEASE) (HCC): Status: ACTIVE | Noted: 2021-08-27

## 2023-03-30 LAB
2HR DELTA HS TROPONIN: -1 NG/L
4HR DELTA HS TROPONIN: 0 NG/L
ANION GAP SERPL CALCULATED.3IONS-SCNC: 5 MMOL/L (ref 4–13)
APTT PPP: 33 SECONDS (ref 23–37)
ATRIAL RATE: 85 BPM
BUN SERPL-MCNC: 11 MG/DL (ref 5–25)
CALCIUM SERPL-MCNC: 8.6 MG/DL (ref 8.4–10.2)
CARDIAC TROPONIN I PNL SERPL HS: 4 NG/L
CARDIAC TROPONIN I PNL SERPL HS: 5 NG/L
CARDIAC TROPONIN I PNL SERPL HS: 5 NG/L
CHLORIDE SERPL-SCNC: 100 MMOL/L (ref 96–108)
CO2 SERPL-SCNC: 35 MMOL/L (ref 21–32)
CREAT SERPL-MCNC: 0.68 MG/DL (ref 0.6–1.3)
ERYTHROCYTE [DISTWIDTH] IN BLOOD BY AUTOMATED COUNT: 14.6 % (ref 11.6–15.1)
FLUAV RNA RESP QL NAA+PROBE: NEGATIVE
FLUBV RNA RESP QL NAA+PROBE: NEGATIVE
GFR SERPL CREATININE-BSD FRML MDRD: 85 ML/MIN/1.73SQ M
GLUCOSE SERPL-MCNC: 96 MG/DL (ref 65–140)
GLUCOSE SERPL-MCNC: 97 MG/DL (ref 65–140)
HCT VFR BLD AUTO: 26.5 % (ref 34.8–46.1)
HGB BLD-MCNC: 8.1 G/DL (ref 11.5–15.4)
INR PPP: 1.07 (ref 0.84–1.19)
LACTATE SERPL-SCNC: 0.4 MMOL/L (ref 0.5–2)
MCH RBC QN AUTO: 30.5 PG (ref 26.8–34.3)
MCHC RBC AUTO-ENTMCNC: 30.6 G/DL (ref 31.4–37.4)
MCV RBC AUTO: 100 FL (ref 82–98)
P AXIS: 70 DEGREES
PLATELET # BLD AUTO: 168 THOUSANDS/UL (ref 149–390)
PMV BLD AUTO: 9.8 FL (ref 8.9–12.7)
POTASSIUM SERPL-SCNC: 3.9 MMOL/L (ref 3.5–5.3)
PR INTERVAL: 138 MS
PROCALCITONIN SERPL-MCNC: <0.05 NG/ML
PROTHROMBIN TIME: 13.7 SECONDS (ref 11.6–14.5)
QRS AXIS: 86 DEGREES
QRSD INTERVAL: 86 MS
QT INTERVAL: 370 MS
QTC INTERVAL: 440 MS
RBC # BLD AUTO: 2.66 MILLION/UL (ref 3.81–5.12)
RSV RNA RESP QL NAA+PROBE: NEGATIVE
SARS-COV-2 RNA RESP QL NAA+PROBE: NEGATIVE
SODIUM SERPL-SCNC: 140 MMOL/L (ref 135–147)
T WAVE AXIS: 85 DEGREES
TSH SERPL DL<=0.05 MIU/L-ACNC: 0.51 UIU/ML (ref 0.45–4.5)
VENTRICULAR RATE: 85 BPM
WBC # BLD AUTO: 8.82 THOUSAND/UL (ref 4.31–10.16)

## 2023-03-30 RX ORDER — HYDROCHLOROTHIAZIDE 25 MG/1
25 TABLET ORAL DAILY
Status: DISCONTINUED | OUTPATIENT
Start: 2023-03-31 | End: 2023-04-03 | Stop reason: HOSPADM

## 2023-03-30 RX ORDER — ONDANSETRON 2 MG/ML
4 INJECTION INTRAMUSCULAR; INTRAVENOUS EVERY 6 HOURS PRN
Status: DISCONTINUED | OUTPATIENT
Start: 2023-03-30 | End: 2023-04-03 | Stop reason: HOSPADM

## 2023-03-30 RX ORDER — CARVEDILOL 6.25 MG/1
6.25 TABLET ORAL 2 TIMES DAILY WITH MEALS
Status: DISCONTINUED | OUTPATIENT
Start: 2023-03-30 | End: 2023-04-03 | Stop reason: HOSPADM

## 2023-03-30 RX ORDER — SODIUM CHLORIDE FOR INHALATION 0.9 %
3 VIAL, NEBULIZER (ML) INHALATION EVERY 8 HOURS PRN
Status: DISCONTINUED | OUTPATIENT
Start: 2023-03-30 | End: 2023-04-03 | Stop reason: HOSPADM

## 2023-03-30 RX ORDER — FERROUS SULFATE 325(65) MG
325 TABLET ORAL
Status: DISCONTINUED | OUTPATIENT
Start: 2023-03-31 | End: 2023-04-03 | Stop reason: HOSPADM

## 2023-03-30 RX ORDER — ASPIRIN 81 MG/1
81 TABLET ORAL DAILY
Status: DISCONTINUED | OUTPATIENT
Start: 2023-03-31 | End: 2023-04-03 | Stop reason: HOSPADM

## 2023-03-30 RX ORDER — ESCITALOPRAM OXALATE 10 MG/1
20 TABLET ORAL DAILY
Status: DISCONTINUED | OUTPATIENT
Start: 2023-03-31 | End: 2023-04-03 | Stop reason: HOSPADM

## 2023-03-30 RX ORDER — NITROGLYCERIN 0.4 MG/1
0.4 TABLET SUBLINGUAL
Status: DISCONTINUED | OUTPATIENT
Start: 2023-03-30 | End: 2023-04-03 | Stop reason: HOSPADM

## 2023-03-30 RX ORDER — ENOXAPARIN SODIUM 100 MG/ML
40 INJECTION SUBCUTANEOUS DAILY
Status: DISCONTINUED | OUTPATIENT
Start: 2023-03-31 | End: 2023-04-03 | Stop reason: HOSPADM

## 2023-03-30 RX ORDER — ALBUTEROL SULFATE 90 UG/1
2 AEROSOL, METERED RESPIRATORY (INHALATION) EVERY 6 HOURS PRN
Status: DISCONTINUED | OUTPATIENT
Start: 2023-03-30 | End: 2023-03-31

## 2023-03-30 RX ORDER — ACETAMINOPHEN 325 MG/1
650 TABLET ORAL EVERY 6 HOURS PRN
Status: DISCONTINUED | OUTPATIENT
Start: 2023-03-30 | End: 2023-04-03 | Stop reason: HOSPADM

## 2023-03-30 RX ORDER — VILAZODONE HYDROCHLORIDE 20 MG/1
40 TABLET ORAL
Status: DISCONTINUED | OUTPATIENT
Start: 2023-03-31 | End: 2023-04-03 | Stop reason: HOSPADM

## 2023-03-30 RX ORDER — LISINOPRIL 10 MG/1
10 TABLET ORAL DAILY
Status: DISCONTINUED | OUTPATIENT
Start: 2023-03-31 | End: 2023-04-03 | Stop reason: HOSPADM

## 2023-03-30 RX ORDER — PRAVASTATIN SODIUM 20 MG
20 TABLET ORAL
Status: DISCONTINUED | OUTPATIENT
Start: 2023-03-30 | End: 2023-04-03 | Stop reason: HOSPADM

## 2023-03-30 RX ORDER — SODIUM CHLORIDE FOR INHALATION 0.9 %
3 VIAL, NEBULIZER (ML) INHALATION
Status: DISCONTINUED | OUTPATIENT
Start: 2023-03-30 | End: 2023-03-30

## 2023-03-30 RX ORDER — BENZONATATE 100 MG/1
100 CAPSULE ORAL 3 TIMES DAILY PRN
Status: DISCONTINUED | OUTPATIENT
Start: 2023-03-30 | End: 2023-04-03 | Stop reason: HOSPADM

## 2023-03-30 RX ORDER — FUROSEMIDE 20 MG/1
20 TABLET ORAL DAILY
Status: DISCONTINUED | OUTPATIENT
Start: 2023-03-31 | End: 2023-04-03 | Stop reason: HOSPADM

## 2023-03-30 RX ORDER — BUDESONIDE AND FORMOTEROL FUMARATE DIHYDRATE 160; 4.5 UG/1; UG/1
2 AEROSOL RESPIRATORY (INHALATION) 2 TIMES DAILY
Status: DISCONTINUED | OUTPATIENT
Start: 2023-03-30 | End: 2023-04-03 | Stop reason: HOSPADM

## 2023-03-30 RX ORDER — GUAIFENESIN 600 MG/1
600 TABLET, EXTENDED RELEASE ORAL 2 TIMES DAILY
Status: DISCONTINUED | OUTPATIENT
Start: 2023-03-30 | End: 2023-04-03 | Stop reason: HOSPADM

## 2023-03-30 RX ORDER — HYDROCODONE BITARTRATE AND ACETAMINOPHEN 5; 325 MG/1; MG/1
1 TABLET ORAL EVERY 6 HOURS PRN
Status: DISCONTINUED | OUTPATIENT
Start: 2023-03-30 | End: 2023-04-03 | Stop reason: HOSPADM

## 2023-03-30 RX ORDER — LANOLIN ALCOHOL/MO/W.PET/CERES
3 CREAM (GRAM) TOPICAL
Status: DISCONTINUED | OUTPATIENT
Start: 2023-03-30 | End: 2023-04-03 | Stop reason: HOSPADM

## 2023-03-30 RX ORDER — LEVALBUTEROL 1.25 MG/.5ML
1.25 SOLUTION, CONCENTRATE RESPIRATORY (INHALATION) EVERY 8 HOURS PRN
Status: DISCONTINUED | OUTPATIENT
Start: 2023-03-30 | End: 2023-04-03 | Stop reason: HOSPADM

## 2023-03-30 RX ADMIN — HYDROCODONE BITARTRATE AND ACETAMINOPHEN 1 TABLET: 5; 325 TABLET ORAL at 18:08

## 2023-03-30 RX ADMIN — ZANUBRUTINIB 160 MG: 80 CAPSULE, GELATIN COATED ORAL at 22:00

## 2023-03-30 RX ADMIN — PRAVASTATIN SODIUM 20 MG: 20 TABLET ORAL at 18:08

## 2023-03-30 RX ADMIN — CEFTRIAXONE SODIUM 1000 MG: 10 INJECTION, POWDER, FOR SOLUTION INTRAVENOUS at 14:05

## 2023-03-30 RX ADMIN — ACETAMINOPHEN 650 MG: 325 TABLET ORAL at 23:03

## 2023-03-30 RX ADMIN — GUAIFENESIN 600 MG: 600 TABLET, EXTENDED RELEASE ORAL at 18:08

## 2023-03-30 RX ADMIN — CARVEDILOL 6.25 MG: 6.25 TABLET, FILM COATED ORAL at 18:08

## 2023-03-30 RX ADMIN — BUDESONIDE AND FORMOTEROL FUMARATE DIHYDRATE 2 PUFF: 160; 4.5 AEROSOL RESPIRATORY (INHALATION) at 20:06

## 2023-03-30 RX ADMIN — IOHEXOL 85 ML: 350 INJECTION, SOLUTION INTRAVENOUS at 13:09

## 2023-03-30 NOTE — ASSESSMENT & PLAN NOTE
· Suspect contributing to current symptoms  · Patient endorses that she is not compliant with prescribed medications  · Offered psychiatry evaluation, however patient is refusing at this time  · Continue home Lexapro, Viibryd

## 2023-03-30 NOTE — ASSESSMENT & PLAN NOTE
· On 4 LNC at baseline, stable at baseline O2 needs  · Does not have any wheezing on examination  · Pulmonary hygiene  · Noted to have suspected pneumonia on CT scan, given 1 dose of IV Ceftriaxone in the ED   No fevers/chills, no worsening cough/sputum production, no change in O2 baseline needs  · procalcitonin WNL, no leukocytosis  · Monitor off antibiotics

## 2023-03-30 NOTE — ASSESSMENT & PLAN NOTE
· Reports generalized weakness, feeling down and concerned regarding multiple hospitalizations recently   Was just in the hospital around 2 weeks ago for COPD exacerbation, reports went to rehab and eventually went home to live with son  · Suspect primarily deconditioning due to multiple hospitalizations, progression of underlying chronic illnesses, with possible component of noncompliance with medications, especially her antidepressants  · PT/OT evaluation

## 2023-03-30 NOTE — ED PROVIDER NOTES
History  Chief Complaint   Patient presents with   • Weakness - Generalized     Pt from home  Reports several days of generalized weakness  Pt A&Ox3 on arrival      HPI patient is a 15-year-old female history of COPD, dependent on 4 L  Presents emergency department son reports she is staring off into space  Son gives a long history of difficulty caring for his mom apparently the last 9 weeks she has been home after being discharged from a skilled rehab  Patient reports just feeling weak and out of it reports that she stares away because there is nothing to do  Orts that she is supposed to be on medicines for depression but apparently not taking them  Son gives a long difficult history of caring for her at home since she was discharged from rehabilitation center  Apparently when she was discharged from rehab she was ambulatory but recently has been unable to walk due to severe dyspnea  Apparently has episodes now where she is staring and son reports that she seems out of it  Had these episodes in December also  Son is concerned because apparently she is not taking her antidepressant medications  Son reports that he tried several times to get her daily medications in a blister pack but unable to get them delivered that way  He is requesting help from case management on getting her medications coordinated  Past medical history of COPD, chronic pain, just of heart failure, benign kidney tumor, depression  Family history noncontributory  Social history smoker, denies drug abuse    Prior to Admission Medications   Prescriptions Last Dose Informant Patient Reported? Taking?    Calcium-Magnesium-Vitamin D (CALCIUM 500 PO)   Yes No   Sig: Take 1,000 mg by mouth daily   HYDROcodone-acetaminophen (NORCO) 5-325 mg per tablet   Yes No   Sig: Take 1 tablet by mouth every 6 (six) hours as needed for pain   Zanubrutinib 80 MG CAPS   Yes No   Sig: Take 160 mg by mouth 2 (two) times a day   albuterol (VENTOLIN HFA) 90 mcg/act inhaler   No No   Sig: Inhale 2 puffs every 6 (six) hours as needed for wheezing   aspirin (ECOTRIN LOW STRENGTH) 81 mg EC tablet   No No   Sig: Take 1 tablet (81 mg total) by mouth daily   benzonatate (TESSALON PERLES) 100 mg capsule   No No   Sig: Take 1 capsule (100 mg total) by mouth 3 (three) times a day as needed for cough   budesonide-formoterol (SYMBICORT) 160-4 5 mcg/act inhaler   No No   Sig: Inhale 2 puffs 2 (two) times a day Rinse mouth after use  carvedilol (COREG) 6 25 mg tablet   No No   Sig: Take 1 tablet (6 25 mg total) by mouth 2 (two) times a day with meals   enalapril (VASOTEC) 5 mg tablet   No No   Sig: Take 1 tablet (5 mg total) by mouth 2 (two) times a day   escitalopram (LEXAPRO) 10 mg tablet   No No   Sig: Take 2 tablets (20 mg total) by mouth daily   ferrous sulfate 325 (65 Fe) mg tablet   Yes No   Sig: Take 325 mg by mouth daily with breakfast   furosemide (LASIX) 20 mg tablet   No No   Sig: Take 1 tablet (20 mg total) by mouth daily   guaiFENesin (MUCINEX) 600 mg 12 hr tablet   No No   Sig: Take 1 tablet (600 mg total) by mouth 2 (two) times a day   hydrochlorothiazide (HYDRODIURIL) 25 mg tablet   No No   Sig: Take 1 tablet (25 mg total) by mouth daily   levalbuterol (XOPENEX) 1 25 mg/0 5 mL nebulizer solution   No No   Sig: Take 0 5 mL (1 25 mg total) by nebulization every 8 (eight) hours as needed for wheezing   melatonin 3 mg   No No   Sig: Take 1 tablet (3 mg total) by mouth daily at bedtime as needed (insomnia)   nicotine (NICODERM CQ) 21 mg/24 hr TD 24 hr patch   No No   Sig: Place 1 patch on the skin over 24 hours daily Do not start before March 8, 2023     nitroglycerin (NITROSTAT) 0 4 mg SL tablet   Yes No   Sig: Place 0 4 mg under the tongue every 5 (five) minutes as needed for chest pain   pravastatin (PRAVACHOL) 20 mg tablet   No No   Sig: Take 1 tablet (20 mg total) by mouth daily with dinner   predniSONE 20 mg tablet   No No   Sig: Take 3 tablets (60 mg total) by mouth daily for 2 days, THEN 2 5 tablets (50 mg total) daily for 3 days, THEN 2 tablets (40 mg total) daily for 3 days, THEN 1 5 tablets (30 mg total) daily for 3 days, THEN 1 tablet (20 mg total) daily for 3 days, THEN 0 5 tablets (10 mg total) daily for 3 days  Do not start before March 14, 2023  sodium chloride 0 9 % nebulizer solution   No No   Sig: Take 3 mL by nebulization 3 (three) times a day   vilazodone (Viibryd) 40 mg tablet   Yes No   Sig: Take 40 mg by mouth daily with breakfast      Facility-Administered Medications: None       Past Medical History:   Diagnosis Date   • Acute congestive heart failure (HCC) 8/27/2021   • Anxiety    • Cardiac disease    • Chest pain 8/27/2021   • Chronic pain disorder    • COPD (chronic obstructive pulmonary disease) ([facility])    • Depression    • Heart disease    • Hyperlipidemia    • Hypertension    • MI (myocardial infarction) ([facility] [address] )    • MRSA (methicillin resistant Staphylococcus aureus)    • Renal disorder     benign kidney tumor       Past Surgical History:   Procedure Laterality Date   • APPENDECTOMY     • ELBOW BURSA SURGERY Left 02/2018    D/T MRSA INFECTION   • SPINAL FUSION      L7 L9       Family History   Problem Relation Age of Onset   • Heart disease Mother    • Cancer Father      I have reviewed and agree with the history as documented      E-Cigarette/Vaping   • E-Cigarette Use Never User      E-Cigarette/Vaping Substances   • Nicotine No    • THC No    • CBD No    • Flavoring No    • Other No    • Unknown No      Social History     Tobacco Use   • Smoking status: Every Day     Packs/day: 1 00     Years: 60 00     Pack years: 60 00     Types: Cigarettes   • Smokeless tobacco: Never   • Tobacco comments:     She has close to a 60 pack-year smoking history, most recently has cut down to 4-5 cigarettes daily   Vaping Use   • Vaping Use: Never used   Substance Use Topics   • Alcohol use: Never   • Drug use: No       Review of Systems   Constitutional: Negative for diaphoresis, fatigue and fever  HENT: Negative for congestion, ear pain, nosebleeds and sore throat  Eyes: Negative for photophobia, pain, discharge and visual disturbance  Respiratory: Negative for cough, choking, chest tightness, shortness of breath and wheezing  Cardiovascular: Negative for chest pain and palpitations  Gastrointestinal: Negative for abdominal distention, abdominal pain, diarrhea and vomiting  Genitourinary: Negative for dysuria, flank pain and frequency  Musculoskeletal: Negative for back pain, gait problem and joint swelling  Skin: Negative for color change and rash  Neurological: Negative for dizziness, syncope and headaches  Psychiatric/Behavioral: Negative for behavioral problems and confusion  The patient is not nervous/anxious  All other systems reviewed and are negative  Staring episodes    Physical Exam  Physical Exam  Vitals and nursing note reviewed  Constitutional:       Appearance: She is well-developed  HENT:      Head: Normocephalic  Right Ear: External ear normal       Left Ear: External ear normal       Nose: Nose normal    Eyes:      General: Lids are normal       Pupils: Pupils are equal, round, and reactive to light  Cardiovascular:      Rate and Rhythm: Normal rate and regular rhythm  Pulses: Normal pulses  Heart sounds: Normal heart sounds  Pulmonary:      Effort: No respiratory distress  Breath sounds: Wheezing present  Abdominal:      General: Abdomen is flat  Bowel sounds are normal       Tenderness: There is no abdominal tenderness  Musculoskeletal:         General: No deformity  Normal range of motion  Cervical back: Normal range of motion and neck supple  Skin:     General: Skin is warm and dry  Neurological:      General: No focal deficit present  Mental Status: She is alert and oriented to person, place, and time     Psychiatric:      Comments: Sad, depressed         Vital Signs  ED Triage Vitals [03/30/23 1220]   Temperature Pulse Respirations Blood Pressure SpO2   98 2 °F (36 8 °C) 84 19 146/75 98 %      Temp Source Heart Rate Source Patient Position - Orthostatic VS BP Location FiO2 (%)   Oral -- -- -- --      Pain Score       No Pain           Vitals:    03/30/23 1300 03/30/23 1315 03/30/23 1330 03/30/23 1400   BP: 155/81 152/76 137/70 156/83   Pulse: 80 86 83 80         Visual Acuity  Visual Acuity    Flowsheet Row Most Recent Value   L Pupil Size (mm) 4   R Pupil Size (mm) 4          ED Medications  Medications   iohexol (OMNIPAQUE) 350 MG/ML injection (SINGLE-DOSE) 85 mL (85 mL Intravenous Given 3/30/23 1309)   ceftriaxone (ROCEPHIN) 1 g/50 mL in dextrose IVPB (1,000 mg Intravenous New Bag 3/30/23 1405)       Diagnostic Studies  Results Reviewed     Procedure Component Value Units Date/Time    HS Troponin I 4hr [[id number]]     Lab Status: No result Specimen: Blood     Procalcitonin [[id number]]  (Normal) Collected: 03/30/23 1343    Lab Status: Final result Specimen: Blood from Arm, Right Updated: 03/30/23 1424     Procalcitonin <0 05 ng/ml     Lactic acid [[id number]]  (Abnormal) Collected: 03/30/23 1343    Lab Status: Final result Specimen: Blood from Arm, Right Updated: 03/30/23 1409     LACTIC ACID 0 4 mmol/L     Narrative:      Result may be elevated if tourniquet was used during collection  Blood culture #2 [[id number]] Collected: 03/30/23 1343    Lab Status: In process Specimen: Blood from Arm, Right Updated: 03/30/23 1349    Blood culture #1 [[id number]] Collected: 03/30/23 1343    Lab Status:  In process Specimen: Blood from Arm, Right Updated: 03/30/23 1349    FLU/RSV/COVID - if FLU/RSV clinically relevant [[id number]]  (Normal) Collected: 03/30/23 1256    Lab Status: Final result Specimen: Nares from Nose Updated: 03/30/23 1342     SARS-CoV-2 Negative     INFLUENZA A PCR Negative     INFLUENZA B PCR Negative     RSV PCR Negative    Narrative:      FOR PEDIATRIC PATIENTS - copy/paste COVID Guidelines URL to browser: [email or web address]    SARS-CoV-2 assay is a Nucleic Acid Amplification assay intended for the  qualitative detection of nucleic acid from SARS-CoV-2 in nasopharyngeal  swabs  Results are for the presumptive identification of SARS-CoV-2 RNA  Positive results are indicative of infection with SARS-CoV-2, the virus  causing COVID-19, but do not rule out bacterial infection or co-infection  with other viruses  Laboratories within the [place] and its  territories are required to report all positive results to the appropriate  public health authorities  Negative results do not preclude SARS-CoV-2  infection and should not be used as the sole basis for treatment or other  patient management decisions  Negative results must be combined with  clinical observations, patient history, and epidemiological information  This test has not been FDA cleared or approved  This test has been authorized by FDA under an Emergency Use Authorization  (EUA)  This test is only authorized for the duration of time the  declaration that circumstances exist justifying the authorization of the  emergency use of an in vitro diagnostic tests for detection of SARS-CoV-2  virus and/or diagnosis of COVID-19 infection under section 564(b)(1) of  the Act, 21 U  S C  530EXW-2(C)(7), unless the authorization is terminated  or revoked sooner  The test has been validated but independent review by FDA  and CLIA is pending  Test performed using [employer] Gene[employer]pert: This RT-PCR assay targets N2,  a region unique to SARS-CoV-2  A conserved region in the E-gene was chosen  for pan-Sarbecovirus detection which includes SARS-CoV-2  According to CMS-2020-01-R, this platform meets the definition of high-throughput technology      HS Troponin 0hr (reflex protocol) [[id number]]  (Normal) Collected: 03/30/23 1256    Lab Status: Final result Specimen: Blood from Arm, Left Updated: 03/30/23 1329     hs TnI 0hr 5 ng/L     HS Troponin I 2hr [[id number]]     Lab Status: No result Specimen: Blood     Protime-INR [[id number]]  (Normal) Collected: 03/30/23 1256    Lab Status: Final result Specimen: Blood from Arm, Left Updated: 03/30/23 1324     Protime 13 7 seconds      INR 1 07    APTT [[id number]]  (Normal) Collected: 03/30/23 1256    Lab Status: Final result Specimen: Blood from Arm, Left Updated: 03/30/23 1324     PTT 33 seconds     Basic metabolic panel [[id number]]  (Abnormal) Collected: 03/30/23 1256    Lab Status: Final result Specimen: Blood from Arm, Left Updated: 03/30/23 1322     Sodium 140 mmol/L      Potassium 3 9 mmol/L      Chloride 100 mmol/L      CO2 35 mmol/L      ANION GAP 5 mmol/L      BUN 11 mg/dL      Creatinine 0 68 mg/dL      Glucose 97 mg/dL      Calcium 8 6 mg/dL      eGFR 85 ml/min/1 73sq m     Narrative:      [place] guidelines for Chronic Kidney Disease (CKD):   •  Stage 1 with normal or high GFR (GFR > 90 mL/min/1 73 square meters)  •  Stage 2 Mild CKD (GFR = 60-89 mL/min/1 73 square meters)  •  Stage 3A Moderate CKD (GFR = 45-59 mL/min/1 73 square meters)  •  Stage 3B Moderate CKD (GFR = 30-44 mL/min/1 73 square meters)  •  Stage 4 Severe CKD (GFR = 15-29 mL/min/1 73 square meters)  •  Stage 5 End Stage CKD (GFR <15 mL/min/1 73 square meters)  Note: GFR calculation is accurate only with a steady state creatinine    CBC and Platelet [[id number]]  (Abnormal) Collected: 03/30/23 1256    Lab Status: Final result Specimen: Blood from Arm, Left Updated: 03/30/23 1303     WBC 8 82 Thousand/uL      RBC 2 66 Million/uL      Hemoglobin 8 1 g/dL      Hematocrit 26 5 %       fL      MCH 30 5 pg      MCHC 30 6 g/dL      RDW 14 6 %      Platelets 148 Thousands/uL      MPV 9 8 fL     Fingerstick Glucose (POCT) [[id number]]  (Normal) Collected: 03/30/23 1259    Lab Status: Final result Updated: 03/30/23 1300     POC Glucose 96 mg/dl CTA stroke alert (head/neck)   Final Result by Eliane Blandon MD (03/30 1334)      Severe stenosis origin right vertebral artery  Moderate stenosis proximal left ICA  No high-grade intracranial stenosis, large vessel occlusion or aneurysm  Left upper lobe consolidation suspicious for pneumonia    Recommend follow-up to ensure resolution  New nodular opacities in the upper lobes may also be infectious/inflammatory but 3 month follow-up is recommended  Other nodules in the upper lungs are    decreased and likely postinflammatory  Findings were directly discussed with Madi Levi at 1:21 PM          Study marked in Epic for follow-up  Workstation performed: [id number]         CT stroke alert brain   Final Result by Eliane Blandon MD (03/30 1334)      No acute intracranial abnormality  Mild chronic microangiopathy  Findings were directly discussed with Madi Levi at 1:21 PM       Workstation performed: [id number]         XR chest 2 views    (Results Pending)              Procedures  ECG 12 Lead Documentation Only    Date/Time: 3/30/2023 12:54 PM  Performed by: Thyra Mcburney, MD  Authorized by: Thyra Mcburney, MD     Indications / Diagnosis:  Weakness  ECG reviewed by me, the ED Provider: yes    Patient location:  ED  Previous ECG:     Previous ECG:  Compared to current    Comparison ECG info:  March 10, 2023    Similarity:  Changes noted  Interpretation:     Interpretation: non-specific    Quality:     Tracing quality:  Limited by artifact  Rate:     ECG rate:  85    ECG rate assessment: normal    Rhythm:     Rhythm: sinus rhythm    Comments:      Normal sinus rhythm no acute ST-T wave changes, LVH by voltage             ED Course       Diagnostic testing, it was low no sign of severe sepsis,  Procalcitonin was normal  COVID testing was negative, influenza testing was negative, RSV testing was negative    Electrolytes were within normal limits normal BUN/creatinine no sign of renal dysfunction  White count was normal at 8 8 no sign of inflammation hemoglobin was 8 1 consistent with chronic anemia  CT scan was performed because The patient complained of some staring episode concern for stroke versus seizure  There is no acute pathology on CT scan other than her left lower lobe showed a new consolidation suspicious for pneumonia  Chest x-ray also showed increased markings at the left base consistent with pneumonia, interpreted by me, COPD also,                    SBIRT 22yo+    Flowsheet Row Most Recent Value   SBIRT (23 yo +)    In order to provide better care to our patients, we are screening all of our patients for alcohol and drug use  Would it be okay to ask you these screening questions? Yes Filed at: 03/30/2023 1222   Initial Alcohol Screen: US AUDIT-C     1  How often do you have a drink containing alcohol? 0 Filed at: 03/30/2023 1222   2  How many drinks containing alcohol do you have on a typical day you are drinking? 0 Filed at: 03/30/2023 1222   3a  Male UNDER 65: How often do you have five or more drinks on one occasion? 0 Filed at: 03/30/2023 1222   3b  FEMALE Any Age, or MALE 65+: How often do you have 4 or more drinks on one occassion? 0 Filed at: 03/30/2023 1222   Audit-C Score 0 Filed at: 03/30/2023 1222   NIA: How many times in the past year have you    Used an illegal drug or used a prescription medication for non-medical reasons? Never Filed at: 03/30/2023 1222                    Medical Decision Making  Medical decision making 81-year-old female presents emergency department, episodes of staring and unresponsiveness according to her son, there was no loss of consciousness  Concern for seizure or stroke a stroke alert was called  I discussed with neurology at length they feel these are probably related to the patient's ongoing depression  On CT scan she did have a pneumonia which is new    Discussed with the hospitalist service, discussed with the son at length  The patient is clinically depressed and apparently discontinued her psychiatric medicines  Discussed with the son possibility of psychiatric consultation while in the hospital   She reports that she understands difficulty and being cared for at home and feels she may require a nursing home at this point  Discussed case management will get involved  Episodes of staring: acute illness or injury  Pneumonia: acute illness or injury  Amount and/or Complexity of Data Reviewed  Labs: ordered  Radiology: ordered  Risk  Prescription drug management  Decision regarding hospitalization  Disposition  Final diagnoses:   Episodes of staring   Pneumonia     Time reflects when diagnosis was documented in both MDM as applicable and the Disposition within this note     Time User Action Codes Description Comment    3/30/2023 12:53 PM Camilo Ferreira Add [R40 4] Episodes of staring     3/30/2023  1:46 PM Camilo Krueger [J18 9] Pneumonia       ED Disposition     ED Disposition   Admit    Condition   Stable    Date/Time   u Mar 30, 2023  1:55 PM    Comment   Case was discussed with hospitalist service and the patient's admission status was agreed to be The patient status to the service of [address] [facility]  Follow-up Information    None         Patient's Medications   Discharge Prescriptions    No medications on file       No discharge procedures on file      PDMP Review       Value Time User    PDMP Reviewed  Yes 8/27/2021  3:13 AM Brian Paula PA-C          ED Provider  Electronically Signed by           Kelsie Briggs MD  03/30/23 1744

## 2023-03-30 NOTE — ACP (ADVANCE CARE PLANNING)
Advanced Care Planning Progress Note    Serious Illness Conversation    1  What is your understanding now of where you are with your illness? Prognostic Understanding: appropriate understanding of prognosis     2  How much information about what is likely to be ahead with your illness would you like to have? Information: patient wants to be fully informed     3  What did you (clinician) communicate to the patient? Prognostic Communication: Function - I hope that this is not the case, but I’m worried that this may be as strong as you will feel, and things are likely to get more difficult  4  If your health situation worsens, what are your most important goals? Goals: have my medical decisions respected     5  If you become sicker, how much are you willing to go through for the possibility of gaining more time? Be in the hospital: Yes Have a feeding tube: No   Be in the ICU: No    Be on a ventilator: No Be uncomfortable: No   Be on dialysis: Yes    6  How much does your proxy and family know about your priorities and wishes? Discussion Discussion: extensive discussion with family about goals and wishes        How does this plan sound to you? I will do everything I can to help you through this    Patient verbalized understanding of the plan     I have spent 45 minutes speaking with my patient on advanced care planning today or during this visit     Advanced directives         Tammy Rodriguez MD

## 2023-03-30 NOTE — ASSESSMENT & PLAN NOTE
· Recently hospitalized for exacerbation, currently does not have any shortness of breath, no wheezing    Stable on chronic 4 LNC at baseline  · Recently finished a course of prednisone  · Does not appear to be in exacerbation at this time  · Continue home inhalers, nebs

## 2023-03-30 NOTE — ASSESSMENT & PLAN NOTE
· Reported staring episodes at home, son concerned regarding stroke or seizure   Initially presented as a stroke alert, CTA and CT head obtained  · Appreciate Neurology recommendations  · No need for stroke work up  · Stressed importance of compliance with meds, continue home ASA and statin  · Consider EEG, patient not interested at this time, consider outpatient

## 2023-03-30 NOTE — CONSULTS
"Consultation - Stroke   Norma Franz 69 y o  female MRN: [id number]  Unit/Bed#: FT 04 Encounter: [id number]      Assessment/Plan     Staring episodes  Assessment & Plan  69 y o  female with COPD with multiple recent admissions for acute exacerbation, HTN, HLD, and depression who presented to [facility] on 3/30/2023 as a stroke alert for generalized weakness x4 days and intermittent staring episodes noted by the patient's son  Patient states that she occasionally \"stares off into space,\" but can hear her son talking to her and quickly replies  (Son was not available to give more history)  · Upon arrival to the ED, /75  NIHSS 0     · CT head negative for acute intracranial abnormalities  · CTA head/neck revealed severe stenosis at the origin of the right vertebral artery, moderate (60%) atherosclerotic stenosis of the proximal left ICA, but no LVO  Incidentally noted left upper lobe pneumonia  · Patient was not a TNK candidate due to being outside the time window  Symptoms likely secondary to multiple recent hospitalizations, deconditioning, acute pneumonia, and underlying depression  Low suspicion for stroke  Low suspicion for seizure, however cannot fully rule out  Can obtain routine EEG  Plan:  - No need for stroke workup  - Routine EEG pending; if study will delay discharge, can be completed outpatient  - No need for AEDs at this time  - Continue home aspirin 81 mg daily  · Patient states that she only takes aspirin occasionally due to nosebleeds  Encourage compliance given atherosclerotic disease seen on CTA  - Continue home pravastatin 20 mg QHS  - Infectious workup and treatment per primary team given pneumonia  - Given depression and concern for medication compliance, recommend follow up with psychiatry  - PT/OT as tolerated  - No further inpatient neuro recommendations      Generalized weakness  Assessment & Plan  - PT/OT  - See assessment/plan above      Thrombolytic Decision: " Patient not a candidate  Unclear time of onset outside appropriate time window  (last known normal 4 days ago)      Recommendations for outpatient neurological follow up have yet to be determined  History of Present Illness     Reason for Consult / Principal Problem: staring episode and 4 days of generalized weakness  Hx and PE limited by: N/A  Patient last known well: unclear; slow decline for several months with worsening 4 days ago  Stroke alert called: 1254 on 3/30/2023  Neurology time of arrival: immediate  HPI: Norma Franz is a 69 y o  female with COPD with multiple recent admissions for acute exacerbation, HTN, HLD, and depression who presented to [facility] on 3/30/2023 as a stroke alert for generalized weakness x4 days and intermittent staring episodes  Patient states that over the last 7 months, she has been slowly declining  She has had multiple recent hospital admissions for COPD exacerbation  She was recently admitted from 3/10 - 3/13/2023 for COPD exacerbation and discharged to rehab  She then moved in with her son  Over the last 4 days, she has had more generalized weakness  Son has been concerned that patient has been having staring episodes  (Son is not available to provide more history)  Patient states that she occasionally stares off, but always hears her son talking to her and then quickly comes out of her staring spell  No history of seizures or strokes  Patient states that she is feeling down due to multiple hospitalizations and needing help with all ADLs  She was recently started on several antidepressants  She is currently living with her son  She uses a walker to ambulate  Upon arrival to the ED, /75  NIHSS 0   CT head negative for acute intracranial abnormalities  CTA head/neck revealed severe stenosis at the origin of the right vertebral artery, moderate (60%) atherosclerotic stenosis of the proximal left ICA  Incidentally noted left upper lobe pneumonia  Patient was not a TNK candidate due to being outside the time window  Due to low suspicion for stroke, patient was not loaded with any antiplatelet  Symptoms likely secondary to multiple recent hospitalizations, deconditioning, pneumonia, and underlying depression  Consult to Neurology  Consult performed by: Daniel Luz PA-C  Consult ordered by: Maicol Sanchez MD          Review of Systems   Musculoskeletal: Positive for gait problem  Neurological: Positive for weakness  Syncope: generalized  Psychiatric/Behavioral: Positive for dysphoric mood  All other systems reviewed and are negative        Historical Information   Past Medical History:   Diagnosis Date   • Acute congestive heart failure ([address] ) 8/27/2021   • Anxiety    • Cardiac disease    • Chest pain 8/27/2021   • Chronic pain disorder    • COPD (chronic obstructive pulmonary disease) ([facility])    • Depression    • Heart disease    • Hyperlipidemia    • Hypertension    • MI (myocardial infarction) ([facility])    • MRSA (methicillin resistant Staphylococcus aureus)    • Renal disorder     benign kidney tumor     Past Surgical History:   Procedure Laterality Date   • APPENDECTOMY     • ELBOW BURSA SURGERY Left 02/2018    D/T MRSA INFECTION   • SPINAL FUSION      L7 L9     Social History   Social History     Substance and Sexual Activity   Alcohol Use Never     Social History     Substance and Sexual Activity   Drug Use No     E-Cigarette/Vaping   • E-Cigarette Use Never User      E-Cigarette/Vaping Substances   • Nicotine No    • THC No    • CBD No    • Flavoring No    • Other No    • Unknown No      Social History     Tobacco Use   Smoking Status Every Day   • Packs/day: 1 00   • Years: 60 00   • Pack years: 60 00   • Types: Cigarettes   Smokeless Tobacco Never   Tobacco Comments    She has close to a 60 pack-year smoking history, most recently has cut down to 4-5 cigarettes daily     Family History:   Family History   Problem Relation Age of Onset   • Heart disease Mother    • Cancer Father        Review of previous medical records was completed  Reviewed prior notes, labs, CT head, CTA head/neck    Meds/Allergies   all current active meds have been reviewed, current meds:   No current facility-administered medications for this encounter  and PTA meds:   Prior to Admission Medications   Prescriptions Last Dose Informant Patient Reported? Taking? Calcium-Magnesium-Vitamin D (CALCIUM 500 PO)   Yes No   Sig: Take 1,000 mg by mouth daily   HYDROcodone-acetaminophen (NORCO) 5-325 mg per tablet   Yes No   Sig: Take 1 tablet by mouth every 6 (six) hours as needed for pain   Zanubrutinib 80 MG CAPS   Yes No   Sig: Take 160 mg by mouth 2 (two) times a day   albuterol (VENTOLIN HFA) 90 mcg/act inhaler   No No   Sig: Inhale 2 puffs every 6 (six) hours as needed for wheezing   aspirin (ECOTRIN LOW STRENGTH) 81 mg EC tablet   No No   Sig: Take 1 tablet (81 mg total) by mouth daily   benzonatate (TESSALON PERLES) 100 mg capsule   No No   Sig: Take 1 capsule (100 mg total) by mouth 3 (three) times a day as needed for cough   budesonide-formoterol (SYMBICORT) 160-4 5 mcg/act inhaler   No No   Sig: Inhale 2 puffs 2 (two) times a day Rinse mouth after use     carvedilol (COREG) 6 25 mg tablet   No No   Sig: Take 1 tablet (6 25 mg total) by mouth 2 (two) times a day with meals   enalapril (VASOTEC) 5 mg tablet   No No   Sig: Take 1 tablet (5 mg total) by mouth 2 (two) times a day   escitalopram (LEXAPRO) 10 mg tablet   No No   Sig: Take 2 tablets (20 mg total) by mouth daily   ferrous sulfate 325 (65 Fe) mg tablet   Yes No   Sig: Take 325 mg by mouth daily with breakfast   furosemide (LASIX) 20 mg tablet   No No   Sig: Take 1 tablet (20 mg total) by mouth daily   guaiFENesin (MUCINEX) 600 mg 12 hr tablet   No No   Sig: Take 1 tablet (600 mg total) by mouth 2 (two) times a day   hydrochlorothiazide (HYDRODIURIL) 25 mg tablet   No No   Sig: Take 1 tablet (25 mg total) by mouth daily   levalbuterol (XOPENEX) 1 25 mg/0 5 mL nebulizer solution   No No   Sig: Take 0 5 mL (1 25 mg total) by nebulization every 8 (eight) hours as needed for wheezing   melatonin 3 mg   No No   Sig: Take 1 tablet (3 mg total) by mouth daily at bedtime as needed (insomnia)   nicotine (NICODERM CQ) 21 mg/24 hr TD 24 hr patch   No No   Sig: Place 1 patch on the skin over 24 hours daily Do not start before March 8, 2023  nitroglycerin (NITROSTAT) 0 4 mg SL tablet   Yes No   Sig: Place 0 4 mg under the tongue every 5 (five) minutes as needed for chest pain   pravastatin (PRAVACHOL) 20 mg tablet   No No   Sig: Take 1 tablet (20 mg total) by mouth daily with dinner   predniSONE 20 mg tablet   No No   Sig: Take 3 tablets (60 mg total) by mouth daily for 2 days, THEN 2 5 tablets (50 mg total) daily for 3 days, THEN 2 tablets (40 mg total) daily for 3 days, THEN 1 5 tablets (30 mg total) daily for 3 days, THEN 1 tablet (20 mg total) daily for 3 days, THEN 0 5 tablets (10 mg total) daily for 3 days  Do not start before March 14, 2023  sodium chloride 0 9 % nebulizer solution   No No   Sig: Take 3 mL by nebulization 3 (three) times a day   vilazodone (Viibryd) 40 mg tablet   Yes No   Sig: Take 40 mg by mouth daily with breakfast      Facility-Administered Medications: None       Allergies   Allergen Reactions   • Sulfa Antibiotics Anaphylaxis   • Niacin    • Statins Other (See Comments)     cramps       Objective   Vitals:Blood pressure 130/76, pulse 83, temperature 98 2 °F (36 8 °C), temperature source Oral, resp  rate 22, SpO2 98 %, not currently breastfeeding  ,There is no height or weight on file to calculate BMI  No intake or output data in the 24 hours ending 03/30/23 1309    Invasive Devices: Invasive Devices     Peripheral Intravenous Line  Duration           Peripheral IV 03/30/23 Left Antecubital <1 day                Physical Exam  Vitals and nursing note reviewed     Constitutional:       Appearance: Normal appearance  Comments: Elderly, chronically ill appearing female lying in bed in no acute distress   HENT:      Head: Normocephalic and atraumatic  Mouth/Throat:      Mouth: Mucous membranes are moist       Pharynx: Oropharynx is clear  Eyes:      Extraocular Movements: Extraocular movements intact  Conjunctiva/sclera: Conjunctivae normal       Pupils: Pupils are equal, round, and reactive to light  Pulmonary:      Effort: Pulmonary effort is normal       Comments: Nasal cannula in place  Musculoskeletal:         General: Normal range of motion  Skin:     General: Skin is warm and dry  Neurological:      Mental Status: She is alert and oriented to person, place, and time  Comments: See full neuro exam below       Neurologic Exam     Mental Status   Oriented to person, place, and time  Patient awake and alert  Oriented to person, place, month, and   No dysarthria or aphasia  Able to follow simple midline and appendicular commands  Slightly flat affect     Cranial Nerves     CN III, IV, VI   Pupils are equal, round, and reactive to light  Pupils 3 mm, round, reactive to light bilaterally  EOMs intact without nystagmus  No visual field deficits  Facial sensation to light touch intact throughout  Facial expressions full and symmetric  Tongue midline  Motor Exam   Muscle bulk: normal  Overall muscle tone: normal  Right arm pronator drift: absent  Left arm pronator drift: absent  5/5 strength in bilateral upper and lower extremities except 4+/5 right iliopsoas muscle     Sensory Exam   Sensation to light touch intact throughout  No extinction abnormalities     Gait, Coordination, and Reflexes     Gait  Gait: (deferred for patient's safety)  No ataxia with finger to nose or heel to shin bilaterally  No resting or action tremor  No staring spells or clinical seizures on exam       NIHSS:  1a Level of Consciousness: 0 = Alert   1b  LOC Questions: 0 = Answers both correctly   1c  LOC Commands: 0 = Obeys both correctly   2  Best Gaze: 0 = Normal   3  Visual: 0 = No visual field loss   4  Facial Palsy: 0=Normal symmetric movement   5a  Motor Right Arm: 0=No drift, limb holds 90 (or 45) degrees for full 10 seconds   5b  Motor Left Arm: 0=No drift, limb holds 90 (or 45) degrees for full 10 seconds   6a  Motor Right Le=No drift, limb holds 90 (or 45) degrees for full 10 seconds   6b  Motor Left Le=No drift, limb holds 90 (or 45) degrees for full 10 seconds   7  Limb Ataxia:  0=Absent   8  Sensory: 0=Normal; no sensory loss   9  Best Language:  0=No aphasia, normal   10  Dysarthria: 0=Normal articulation   11  Extinction and Inattention (formerly Neglect): 0=No abnormality   Total Score: 0     Time NIHSS was completed: 1:13 PM    Modified [place] Score:  Unable to determine currently, will gather additional data    Lab Results:   I have personally reviewed pertinent reports  , CBC:   Results from last 7 days   Lab Units 23  1256   WBC Thousand/uL 8 82   RBC Million/uL 2 66*   HEMOGLOBIN g/dL 8 1*   HEMATOCRIT % 26 5*   MCV fL 100*   PLATELETS Thousands/uL 168   , BMP/CMP:   Results from last 7 days   Lab Units 23  1256   SODIUM mmol/L 140   POTASSIUM mmol/L 3 9   CHLORIDE mmol/L 100   CO2 mmol/L 35*   BUN mg/dL 11   CREATININE mg/dL 0 68   CALCIUM mg/dL 8 6   EGFR ml/min/1 73sq m 85   , Vitamin B12:   , HgBA1C:   , TSH:   , Coagulation:   Results from last 7 days   Lab Units 23  1256   INR  1 07   , Lipid Profile:   , Ammonia:   , Urinalysis:       Invalid input(s): URIBILINOGEN, Drug Screen:   , Medication Drug Levels:       Invalid input(s): CARBAMAZEPINE,  PHENOBARB, LACOSAMIDE, OXCARBAZEPINE  Imaging Studies: I have personally reviewed pertinent reports  and I have personally reviewed pertinent films in PACS  EKG, Pathology, and Other Studies: I have personally reviewed pertinent reports     and I have personally reviewed pertinent films in PACS  VTE Prophylaxis: Sequential compression device Dejuan Jean)     Code Status: Prior  Advance Directive and Living Will:      Power of :    POLST:      Please refer to attending's attestation for critical care time

## 2023-03-30 NOTE — H&P
[address] [place] [address]  H&P  Name: Quin Obrien  MRN: [id number]  Unit/Bed#: FT 04 I Date of Admission: 3/30/2023   Date of Service: 3/30/2023 I Hospital Day: 0      Assessment/Plan   * Generalized weakness  Assessment & Plan  · Reports generalized weakness, feeling down and concerned regarding multiple hospitalizations recently  Was just in the hospital around 2 weeks ago for COPD exacerbation, reports went to rehab and eventually went home to live with son  · Suspect primarily deconditioning due to multiple hospitalizations, progression of underlying chronic illnesses, with possible component of noncompliance with medications, especially her antidepressants  · PT/OT evaluation    Staring episodes  Assessment & Plan  · Reported staring episodes at home, son concerned regarding stroke or seizure  Initially presented as a stroke alert, CTA and CT head obtained  · Appreciate Neurology recommendations  · No need for stroke work up  · Stressed importance of compliance with meds, continue home ASA and statin  · Consider EEG, patient not interested at this time, consider outpatient    Anemia  Assessment & Plan  · Appears to be at baseline, no reports of bleeding  · Continue iron supplementation    COPD (chronic obstructive pulmonary disease) ([facility] )  Assessment & Plan  · Recently hospitalized for exacerbation, currently does not have any shortness of breath, no wheezing    Stable on chronic 4 LNC at baseline  · Recently finished a course of prednisone  · Does not appear to be in exacerbation at this time  · Continue home inhalers, nebs    Major depressive disorder, recurrent episode, moderate ([facility] )  Assessment & Plan  · Suspect contributing to current symptoms  · Patient endorses that she is not compliant with prescribed medications  · Offered psychiatry evaluation, however patient is refusing at this time  · Continue home Lexapro, Viibryd    Chronic respiratory failure with hypoxia ([facility] )  Assessment & Plan  · On 4 LNC at baseline, stable at baseline O2 needs  · Does not have any wheezing on examination  · Pulmonary hygiene  · Noted to have suspected pneumonia on CT scan, given 1 dose of IV Ceftriaxone in the ED  No fevers/chills, no worsening cough/sputum production, no change in O2 baseline needs  · procalcitonin WNL, no leukocytosis  · Monitor off antibiotics    Tobacco abuse  Assessment & Plan  · Discussed regarding cessation for 5 minutes  · Offered nicotine patch    Essential hypertension  Assessment & Plan  · Continue home Coreg, enalapril, Lasix, HCTZ  · Monitor BP        VTE Pharmacologic Prophylaxis: VTE Score: 3 Moderate Risk (Score 3-4) - Pharmacological DVT Prophylaxis Ordered: enoxaparin (Lovenox)  Code Status: DNR/DNI  Discussion with family: Patient declined call to   Anticipated Length of Stay: Patient will be admitted on an observation basis with an anticipated length of stay of less than 2 midnights secondary to generalized weakness, PT/OT evaluation  Total Time Spent on Date of Encounter in care of patient: 60 minutes This time was spent on one or more of the following: performing physical exam; counseling and coordination of care; obtaining or reviewing history; documenting in the medical record; reviewing/ordering tests, medications or procedures; communicating with other healthcare professionals and discussing with patient's family/caregivers  Chief Complaint: weakness, staring episodes at home    History of Present Illness:  Deloris Simmons is a 69 y o  female with a PMH of HTN, HLD, [place] macroglobulinemia, tobacco abuse, depression, COPD, chronic hypoxic respiratory failure who presents with generalized weakness and staring episodes at home  Patient reported multiple hospitalizations for COPD exacerbation, most recently around 2 weeks ago for which she was treated with nebs and was sent home with steroid tapering dose    Patient reported she finished her course  At baseline with cough and sputum production  No noted worsening  No fevers or chills at home  Reports she has been feeling generally weak and has been on a decline for the past few months  She also reports that her son was concerned that she was having some strokelike symptoms or seizure episodes at home  For that she was having some staring spells  Although according to patient, she was aware of what was going on and was just trying to not pay attention to her surroundings  Review of Systems:  Review of Systems   Constitutional: Negative for chills and fever  HENT: Negative for tinnitus and trouble swallowing  Eyes: Negative for pain and visual disturbance  Respiratory: Positive for cough  Negative for shortness of breath  Cardiovascular: Negative for chest pain  Gastrointestinal: Negative for abdominal pain, nausea and vomiting  Genitourinary: Negative for dysuria and hematuria  Musculoskeletal: Positive for arthralgias  Skin: Negative for pallor and rash  Neurological: Positive for weakness  Negative for dizziness  Hematological: Negative for adenopathy  Psychiatric/Behavioral:        (+) depression       Past Medical and Surgical History:   Past Medical History:   Diagnosis Date   • Acute congestive heart failure ([facility] ) 8/27/2021   • Anxiety    • Cardiac disease    • Chest pain 8/27/2021   • Chronic pain disorder    • COPD (chronic obstructive pulmonary disease) ([facility])    • Depression    • Heart disease    • Hyperlipidemia    • Hypertension    • MI (myocardial infarction) ([facility] )    • MRSA (methicillin resistant Staphylococcus aureus)    • Renal disorder     benign kidney tumor       Past Surgical History:   Procedure Laterality Date   • APPENDECTOMY     • ELBOW BURSA SURGERY Left 02/2018    D/T MRSA INFECTION   • SPINAL FUSION      L7 L9       Meds/Allergies:  Prior to Admission medications    Medication Sig Start Date End Date Taking?  Authorizing Provider   albuterol (VENTOLIN HFA) 90 mcg/act inhaler Inhale 2 puffs every 6 (six) hours as needed for wheezing 1/7/20   Raghav Garcia PA-C   aspirin (ECOTRIN LOW STRENGTH) 81 mg EC tablet Take 1 tablet (81 mg total) by mouth daily 8/31/21 9/30/21  Mario Vogt MD   benzonatate (TESSALON PERLES) 100 mg capsule Take 1 capsule (100 mg total) by mouth 3 (three) times a day as needed for cough 3/7/23   Gillian Wang PA-C   budesonide-formoterol (SYMBICORT) 160-4 5 mcg/act inhaler Inhale 2 puffs 2 (two) times a day Rinse mouth after use   1/7/20   Raghav Garcia PA-C   Calcium-Magnesium-Vitamin D (CALCIUM 500 PO) Take 1,000 mg by mouth daily    Historical Provider, MD   carvedilol (COREG) 6 25 mg tablet Take 1 tablet (6 25 mg total) by mouth 2 (two) times a day with meals 8/30/21 9/29/21  Mario Vogt MD   enalapril (VASOTEC) 5 mg tablet Take 1 tablet (5 mg total) by mouth 2 (two) times a day 1/8/20   Raghav Gacria PA-C   escitalopram (LEXAPRO) 10 mg tablet Take 2 tablets (20 mg total) by mouth daily 3/13/23 4/12/23  Namrata Cuellar MD   ferrous sulfate 325 (65 Fe) mg tablet Take 325 mg by mouth daily with breakfast    Historical Provider, MD   furosemide (LASIX) 20 mg tablet Take 1 tablet (20 mg total) by mouth daily 8/31/21 9/30/21  Mario Vogt MD   guaiFENesin (MUCINEX) 600 mg 12 hr tablet Take 1 tablet (600 mg total) by mouth 2 (two) times a day 12/23/18   Carmen Lang MD   hydrochlorothiazide (HYDRODIURIL) 25 mg tablet Take 1 tablet (25 mg total) by mouth daily 1/7/20   Raghav Garcia PA-C   HYDROcodone-acetaminophen (NORCO) 5-325 mg per tablet Take 1 tablet by mouth every 6 (six) hours as needed for pain    Historical Provider, MD   levalbuterol (XOPENEX) 1 25 mg/0 5 mL nebulizer solution Take 0 5 mL (1 25 mg total) by nebulization every 8 (eight) hours as needed for wheezing 3/13/23 4/12/23  Namrata Cuellar MD   melatonin 3 mg Take 1 tablet (3 mg total) by mouth daily at bedtime as needed (insomnia) 1/7/20   TAMMIE Rg   nicotine (NICODERM CQ) 21 mg/24 hr TD 24 hr patch Place 1 patch on the skin over 24 hours daily Do not start before March 8, 2023  3/8/23   Alex Wang PA-C   nitroglycerin (NITROSTAT) 0 4 mg SL tablet Place 0 4 mg under the tongue every 5 (five) minutes as needed for chest pain    Historical Provider, MD   pravastatin (PRAVACHOL) 20 mg tablet Take 1 tablet (20 mg total) by mouth daily with dinner 8/30/21 9/29/21  Ene Coleman MD   predniSONE 20 mg tablet Take 3 tablets (60 mg total) by mouth daily for 2 days, THEN 2 5 tablets (50 mg total) daily for 3 days, THEN 2 tablets (40 mg total) daily for 3 days, THEN 1 5 tablets (30 mg total) daily for 3 days, THEN 1 tablet (20 mg total) daily for 3 days, THEN 0 5 tablets (10 mg total) daily for 3 days  Do not start before March 14, 2023  3/14/23 3/31/23  Diane Harris MD   sodium chloride 0 9 % nebulizer solution Take 3 mL by nebulization 3 (three) times a day 12/23/18   Rosy Peralta MD   vilazodone (Viibryd) 40 mg tablet Take 40 mg by mouth daily with breakfast    Historical Provider, MD   Zanubrutinib 80 MG CAPS Take 160 mg by mouth 2 (two) times a day    Historical Provider, MD VALDEZ have reviewed home medications with patient personally  Allergies:    Allergies   Allergen Reactions   • Sulfa Antibiotics Anaphylaxis   • Niacin    • Statins Other (See Comments)     cramps       Social History:  Marital Status:    Occupation: NA  Patient Pre-hospital Living Situation: Home  Patient Pre-hospital Level of Mobility: walks with walker  Patient Pre-hospital Diet Restrictions: NA  Substance Use History:   Social History     Substance and Sexual Activity   Alcohol Use Never     Social History     Tobacco Use   Smoking Status Every Day   • Packs/day: 1 00   • Years: 60 00   • Pack years: 60 00   • Types: Cigarettes   Smokeless Tobacco Never   Tobacco Comments    She has close to a 60 pack-year smoking history, most recently has cut down to 4-5 cigarettes daily     Social History     Substance and Sexual Activity   Drug Use No       Family History:  Family History   Problem Relation Age of Onset   • Heart disease Mother    • Cancer Father        Physical Exam:     Vitals:   Blood Pressure: 168/78 (03/30/23 1530)  Pulse: 87 (03/30/23 1530)  Temperature: 98 2 °F (36 8 °C) (03/30/23 1220)  Temp Source: Oral (03/30/23 1220)  Respirations: 19 (03/30/23 1530)  SpO2: 98 % (03/30/23 1530)    Physical Exam  Vitals and nursing note reviewed  Constitutional:       General: She is not in acute distress  Appearance: She is not toxic-appearing  Comments: Chronically ill appearing, frail looking   HENT:      Head: Normocephalic and atraumatic  Nose: Nose normal       Mouth/Throat:      Mouth: Mucous membranes are moist       Pharynx: Oropharynx is clear  Eyes:      Conjunctiva/sclera: Conjunctivae normal       Pupils: Pupils are equal, round, and reactive to light  Cardiovascular:      Rate and Rhythm: Normal rate  Pulses: Normal pulses  Pulmonary:      Effort: Pulmonary effort is normal       Breath sounds: Normal breath sounds  No wheezing  Comments: On 4L NC  Abdominal:      Palpations: Abdomen is soft  Tenderness: There is no abdominal tenderness  There is no guarding  Musculoskeletal:         General: No swelling or tenderness  Normal range of motion  Cervical back: Normal range of motion  Skin:     General: Skin is warm and dry  Neurological:      General: No focal deficit present  Mental Status: She is alert and oriented to person, place, and time  Mental status is at baseline     Psychiatric:         Behavior: Behavior normal       Comments: Depressed mood         Additional Data:     Lab Results:  Results from last 7 days   Lab Units 03/30/23  1256   WBC Thousand/uL 8 82   HEMOGLOBIN g/dL 8 1*   HEMATOCRIT % 26 5*   PLATELETS Thousands/uL 168     Results from last 7 days   Lab Units 03/30/23  1256   SODIUM mmol/L 140   POTASSIUM mmol/L 3 9   CHLORIDE mmol/L 100   CO2 mmol/L 35*   BUN mg/dL 11   CREATININE mg/dL 0 68   ANION GAP mmol/L 5   CALCIUM mg/dL 8 6   GLUCOSE RANDOM mg/dL 97     Results from last 7 days   Lab Units 03/30/23  1256   INR  1 07     Results from last 7 days   Lab Units 03/30/23  1259   POC GLUCOSE mg/dl 96         Results from last 7 days   Lab Units 03/30/23  1343   LACTIC ACID mmol/L 0 4*   PROCALCITONIN ng/ml <0 05       Lines/Drains:  Invasive Devices     Peripheral Intravenous Line  Duration           Peripheral IV 03/30/23 Left Antecubital <1 day    Peripheral IV 03/30/23 Right Antecubital <1 day                    Imaging: Reviewed radiology reports from this admission including: chest xray and CT head  CTA stroke alert (head/neck)   Final Result by Shirley Sanchez MD (03/30 1334)      Severe stenosis origin right vertebral artery  Moderate stenosis proximal left ICA  No high-grade intracranial stenosis, large vessel occlusion or aneurysm  Left upper lobe consolidation suspicious for pneumonia    Recommend follow-up to ensure resolution  New nodular opacities in the upper lobes may also be infectious/inflammatory but 3 month follow-up is recommended  Other nodules in the upper lungs are    decreased and likely postinflammatory  Findings were directly discussed with Blayne Velasquez at 1:21 PM          Study marked in Epic for follow-up  Workstation performed: [id number]         CT stroke alert brain   Final Result by Shirley Sanchez MD (03/30 1334)      No acute intracranial abnormality  Mild chronic microangiopathy  Findings were directly discussed with Blayne Velasquez at 1:21 PM       Workstation performed: [id number]         XR chest 2 views    (Results Pending)       EKG and Other Studies Reviewed on Admission:   · EKG: NSR  HR 80s      ** Please Note: This note has been constructed using a voice recognition system   **

## 2023-03-30 NOTE — ASSESSMENT & PLAN NOTE
"69 y o  female with COPD with multiple recent admissions for acute exacerbation, HTN, HLD, and depression who presented to [facility] on 3/30/2023 as a stroke alert for generalized weakness x4 days and intermittent staring episodes noted by the patient's son  Patient states that she occasionally \"stares off into space,\" but can hear her son talking to her and quickly replies  (Son was not available to give more history)  · Upon arrival to the ED, /75  NIHSS 0     · CT head negative for acute intracranial abnormalities  · CTA head/neck revealed severe stenosis at the origin of the right vertebral artery, moderate (60%) atherosclerotic stenosis of the proximal left ICA, but no LVO  Incidentally noted left upper lobe pneumonia  · Patient was not a TNK candidate due to being outside the time window  Symptoms likely secondary to multiple recent hospitalizations, deconditioning, acute pneumonia, and underlying depression  Low suspicion for stroke  Low suspicion for seizure, however cannot fully rule out  Can obtain routine EEG  Plan:  - No need for stroke workup  - Routine EEG pending; if study will delay discharge, can be completed outpatient  - No need for AEDs at this time  - Continue home aspirin 81 mg daily  · Patient states that she only takes aspirin occasionally due to nosebleeds  Encourage compliance given atherosclerotic disease seen on CTA  - Continue home pravastatin 20 mg QHS  - Infectious workup and treatment per primary team given pneumonia  - Given depression and concern for medication compliance, recommend follow up with psychiatry  - PT/OT as tolerated  - No further inpatient neuro recommendations    "

## 2023-03-31 LAB
ANION GAP SERPL CALCULATED.3IONS-SCNC: 5 MMOL/L (ref 4–13)
BASOPHILS # BLD AUTO: 0.03 THOUSANDS/ÂΜL (ref 0–0.1)
BASOPHILS NFR BLD AUTO: 0 % (ref 0–1)
BUN SERPL-MCNC: 11 MG/DL (ref 5–25)
CALCIUM SERPL-MCNC: 8.8 MG/DL (ref 8.4–10.2)
CHLORIDE SERPL-SCNC: 99 MMOL/L (ref 96–108)
CO2 SERPL-SCNC: 35 MMOL/L (ref 21–32)
CREAT SERPL-MCNC: 0.76 MG/DL (ref 0.6–1.3)
EOSINOPHIL # BLD AUTO: 0.3 THOUSAND/ÂΜL (ref 0–0.61)
EOSINOPHIL NFR BLD AUTO: 3 % (ref 0–6)
ERYTHROCYTE [DISTWIDTH] IN BLOOD BY AUTOMATED COUNT: 14.4 % (ref 11.6–15.1)
GFR SERPL CREATININE-BSD FRML MDRD: 76 ML/MIN/1.73SQ M
GLUCOSE SERPL-MCNC: 89 MG/DL (ref 65–140)
HCT VFR BLD AUTO: 23.4 % (ref 34.8–46.1)
HGB BLD-MCNC: 7.3 G/DL (ref 11.5–15.4)
IMM GRANULOCYTES # BLD AUTO: 0.02 THOUSAND/UL (ref 0–0.2)
IMM GRANULOCYTES NFR BLD AUTO: 0 % (ref 0–2)
LYMPHOCYTES # BLD AUTO: 5.32 THOUSANDS/ÂΜL (ref 0.6–4.47)
LYMPHOCYTES NFR BLD AUTO: 60 % (ref 14–44)
MCH RBC QN AUTO: 30.4 PG (ref 26.8–34.3)
MCHC RBC AUTO-ENTMCNC: 31.2 G/DL (ref 31.4–37.4)
MCV RBC AUTO: 98 FL (ref 82–98)
MONOCYTES # BLD AUTO: 0.84 THOUSAND/ÂΜL (ref 0.17–1.22)
MONOCYTES NFR BLD AUTO: 9 % (ref 4–12)
NEUTROPHILS # BLD AUTO: 2.5 THOUSANDS/ÂΜL (ref 1.85–7.62)
NEUTS SEG NFR BLD AUTO: 28 % (ref 43–75)
NRBC BLD AUTO-RTO: 0 /100 WBCS
PLATELET # BLD AUTO: 166 THOUSANDS/UL (ref 149–390)
PMV BLD AUTO: 9.8 FL (ref 8.9–12.7)
POTASSIUM SERPL-SCNC: 3.7 MMOL/L (ref 3.5–5.3)
PROCALCITONIN SERPL-MCNC: <0.05 NG/ML
RBC # BLD AUTO: 2.4 MILLION/UL (ref 3.81–5.12)
SODIUM SERPL-SCNC: 139 MMOL/L (ref 135–147)
WBC # BLD AUTO: 9.01 THOUSAND/UL (ref 4.31–10.16)

## 2023-03-31 RX ORDER — CARVEDILOL 25 MG/1
25 TABLET ORAL 2 TIMES DAILY WITH MEALS
COMMUNITY

## 2023-03-31 RX ORDER — ESCITALOPRAM OXALATE 20 MG/1
20 TABLET ORAL DAILY
COMMUNITY
End: 2023-04-03

## 2023-03-31 RX ORDER — BUPROPION HYDROCHLORIDE 75 MG/1
75 TABLET ORAL DAILY
COMMUNITY

## 2023-03-31 RX ORDER — LEVALBUTEROL 1.25 MG/.5ML
1.25 SOLUTION, CONCENTRATE RESPIRATORY (INHALATION)
Status: DISCONTINUED | OUTPATIENT
Start: 2023-03-31 | End: 2023-04-03 | Stop reason: HOSPADM

## 2023-03-31 RX ORDER — ALBUTEROL SULFATE 90 UG/1
2 AEROSOL, METERED RESPIRATORY (INHALATION) EVERY 4 HOURS PRN
Status: DISCONTINUED | OUTPATIENT
Start: 2023-03-31 | End: 2023-04-03 | Stop reason: HOSPADM

## 2023-03-31 RX ADMIN — HYDROCODONE BITARTRATE AND ACETAMINOPHEN 1 TABLET: 5; 325 TABLET ORAL at 00:06

## 2023-03-31 RX ADMIN — GUAIFENESIN 600 MG: 600 TABLET, EXTENDED RELEASE ORAL at 17:47

## 2023-03-31 RX ADMIN — VILAZODONE HYDROCHLORIDE 40 MG: 20 TABLET ORAL at 08:30

## 2023-03-31 RX ADMIN — HYDROCODONE BITARTRATE AND ACETAMINOPHEN 1 TABLET: 5; 325 TABLET ORAL at 19:58

## 2023-03-31 RX ADMIN — ZANUBRUTINIB 160 MG: 80 CAPSULE, GELATIN COATED ORAL at 22:15

## 2023-03-31 RX ADMIN — HYDROCODONE BITARTRATE AND ACETAMINOPHEN 1 TABLET: 5; 325 TABLET ORAL at 08:55

## 2023-03-31 RX ADMIN — FERROUS SULFATE TAB 325 MG (65 MG ELEMENTAL FE) 325 MG: 325 (65 FE) TAB at 08:30

## 2023-03-31 RX ADMIN — IPRATROPIUM BROMIDE 0.5 MG: 0.5 SOLUTION RESPIRATORY (INHALATION) at 20:38

## 2023-03-31 RX ADMIN — ENOXAPARIN SODIUM 40 MG: 40 INJECTION SUBCUTANEOUS at 08:31

## 2023-03-31 RX ADMIN — BUDESONIDE AND FORMOTEROL FUMARATE DIHYDRATE 2 PUFF: 160; 4.5 AEROSOL RESPIRATORY (INHALATION) at 08:41

## 2023-03-31 RX ADMIN — GUAIFENESIN 600 MG: 600 TABLET, EXTENDED RELEASE ORAL at 08:30

## 2023-03-31 RX ADMIN — CARVEDILOL 6.25 MG: 6.25 TABLET, FILM COATED ORAL at 08:30

## 2023-03-31 RX ADMIN — LEVALBUTEROL HYDROCHLORIDE 1.25 MG: 1.25 SOLUTION, CONCENTRATE RESPIRATORY (INHALATION) at 20:38

## 2023-03-31 RX ADMIN — ZANUBRUTINIB 160 MG: 80 CAPSULE, GELATIN COATED ORAL at 06:57

## 2023-03-31 RX ADMIN — ASPIRIN 81 MG: 81 TABLET ORAL at 08:30

## 2023-03-31 RX ADMIN — ESCITALOPRAM OXALATE 20 MG: 10 TABLET ORAL at 08:30

## 2023-03-31 RX ADMIN — BUDESONIDE AND FORMOTEROL FUMARATE DIHYDRATE 2 PUFF: 160; 4.5 AEROSOL RESPIRATORY (INHALATION) at 17:48

## 2023-03-31 RX ADMIN — LISINOPRIL 10 MG: 10 TABLET ORAL at 08:43

## 2023-03-31 RX ADMIN — PRAVASTATIN SODIUM 20 MG: 20 TABLET ORAL at 17:47

## 2023-03-31 RX ADMIN — HYDROCHLOROTHIAZIDE 25 MG: 25 TABLET ORAL at 08:30

## 2023-03-31 RX ADMIN — CARVEDILOL 6.25 MG: 6.25 TABLET, FILM COATED ORAL at 17:47

## 2023-03-31 RX ADMIN — FUROSEMIDE 20 MG: 20 TABLET ORAL at 08:30

## 2023-03-31 NOTE — PLAN OF CARE
Problem: INFECTION - ADULT  Goal: Absence or prevention of progression during hospitalization  Description: INTERVENTIONS:  - Assess and monitor for signs and symptoms of infection  - Monitor lab/diagnostic results  - Monitor all insertion sites, i e  indwelling lines, tubes, and drains  - Monitor endotracheal if appropriate and nasal secretions for changes in amount and color  - [place] appropriate cooling/warming therapies per order  - Administer medications as ordered  - Instruct and encourage patient and family to use good hand hygiene technique  - Identify and instruct in appropriate isolation precautions for identified infection/condition  Outcome: Progressing     Problem: Knowledge Deficit  Goal: Patient/family/caregiver demonstrates understanding of disease process, treatment plan, medications, and discharge instructions  Description: Complete learning assessment and assess knowledge base    Interventions:  - Provide teaching at level of understanding  - Provide teaching via preferred learning methods  Outcome: Progressing

## 2023-03-31 NOTE — PROGRESS NOTES
[facility] [facility]  Progress Note  Name: Britni Pugh  MRN: [id number]  Unit/Bed#: -01 I Date of Admission: 3/30/2023   Date of Service: 3/31/2023 I Hospital Day: 0    Assessment/Plan   * Generalized weakness  Assessment & Plan  · Reports generalized weakness, feeling down and concerned regarding multiple hospitalizations recently  Was just in the hospital around 2 weeks ago for COPD exacerbation, reports went to rehab and eventually went home to live with son  · Suspect primarily deconditioning due to multiple hospitalizations, progression of underlying chronic illnesses, with possible component of noncompliance with medications, especially her antidepressants  · PT/OT evaluation-postacute rehab    Staring episodes  Assessment & Plan  · Reported staring episodes at home, son concerned regarding stroke or seizure  Initially presented as a stroke alert, CTA and CT head obtained  · Appreciate Neurology recommendations  · No need for stroke work up  · Stressed importance of compliance with meds, continue home ASA and statin  · Consider EEG, patient not interested at this time, consider outpatient    Anemia  Assessment & Plan  · Appears to be at baseline, no reports of bleeding  · Continue iron supplementation    COPD (chronic obstructive pulmonary disease) ([facility] [address] )  Assessment & Plan  · Recently hospitalized for exacerbation, currently does not have any shortness of breath, no wheezing    Stable on chronic 4 LNC at baseline  · Recently finished a course of prednisone  · Does not appear to be in exacerbation at this time  · Continue home inhalers, nebs    Major depressive disorder, recurrent episode, moderate ([facility] [address] )  Assessment & Plan  · Suspect contributing to current symptoms  · Patient endorses that she is not compliant with prescribed medications  · Offered psychiatry evaluation, however patient is refusing at this time  · Continue home Lexapro, Viibryd    Chronic respiratory failure with hypoxia ([facility] [address] )  Assessment & Plan  · On 4 LNC at baseline, stable at baseline O2 needs  · Does not have any wheezing on examination  · Pulmonary hygiene  · Noted to have suspected pneumonia on CT scan, given 1 dose of IV Ceftriaxone in the ED  No fevers/chills, no worsening cough/sputum production, no change in O2 baseline needs  · procalcitonin WNL, no leukocytosis  · Monitor off antibiotics    Tobacco abuse  Assessment & Plan  · Discussed regarding cessation for 5 minutes  · Offered nicotine patch    Essential hypertension  Assessment & Plan  · Continue home Coreg, enalapril, Lasix, HCTZ  · Monitor BP           VTE Pharmacologic Prophylaxis: VTE Score: 3 Moderate Risk (Score 3-4) - Pharmacological DVT Prophylaxis Ordered: enoxaparin (Lovenox)  Patient Centered Rounds: I performed bedside rounds with nursing staff today  Discussions with Specialists or Other Care Team Provider: Cm, Neuro    Education and Discussions with Family / Patient: Updated  (son) at bedside  Total Time Spent on Date of Encounter in care of patient: 35 minutes This time was spent on one or more of the following: performing physical exam; counseling and coordination of care; obtaining or reviewing history; documenting in the medical record; reviewing/ordering tests, medications or procedures; communicating with other healthcare professionals and discussing with patient's family/caregivers  Current Length of Stay: 0 day(s)  Current Patient Status: Observation   Certification Statement: The patient will continue to require additional inpatient hospital stay due to Awaiting placement for short-term rehab, continuing psych medication  Discharge Plan: Anticipate discharge in 24-48 hrs to rehab facility  Code Status: Level 3 - DNAR and DNI    Subjective:   Patient denies shortness of breath, chest pain/pressure, palpitations, lightheadedness, nausea, chills, or GI discomfort      Objective:     Vitals:   Temp (24hrs), Av 1 °F (36 7 °C), Min:97 8 °F (36 6 °C), Max:98 5 °F (36 9 °C)    Temp:  [97 8 °F (36 6 °C)-98 5 °F (36 9 °C)] 97 9 °F (36 6 °C)  HR:  [80-96] 96  Resp:  [17-22] 17  BP: (130-174)/(70-87) 168/87  SpO2:  [82 %-99 %] 97 %  Body mass index is 19 82 kg/m²  Input and Output Summary (last 24 hours): Intake/Output Summary (Last 24 hours) at 3/31/2023 1156  Last data filed at 3/31/2023 1002  Gross per 24 hour   Intake 300 ml   Output 750 ml   Net -450 ml       Physical Exam:   Physical Exam  Vitals and nursing note reviewed  Constitutional:       Appearance: She is normal weight  HENT:      Head: Normocephalic  Nose: Nose normal       Mouth/Throat:      Mouth: Mucous membranes are moist       Pharynx: Oropharynx is clear  Eyes:      General: No scleral icterus  Conjunctiva/sclera: Conjunctivae normal       Pupils: Pupils are equal, round, and reactive to light  Cardiovascular:      Rate and Rhythm: Normal rate and regular rhythm  Heart sounds: No murmur heard  No friction rub  No gallop  Pulmonary:      Effort: Pulmonary effort is normal  No respiratory distress  Breath sounds: No stridor  Wheezing ( Wheezing on inspiration and apical areas bilaterally) present  No rhonchi or rales  Abdominal:      General: Abdomen is flat  Palpations: Abdomen is soft  Musculoskeletal:         General: Normal range of motion  Cervical back: Normal range of motion and neck supple  Right lower leg: No edema  Left lower leg: No edema  Lymphadenopathy:      Cervical: No cervical adenopathy  Skin:     General: Skin is warm  Coloration: Skin is not jaundiced or pale  Findings: No bruising, erythema or lesion  Neurological:      General: No focal deficit present  Mental Status: She is alert and oriented to person, place, and time  Mental status is at baseline  Cranial Nerves: No cranial nerve deficit  Motor: No weakness     Psychiatric:         Mood and Affect: Mood normal          Behavior: Behavior normal          Thought Content: Thought content normal           Additional Data:     Labs:  Results from last 7 days   Lab Units 03/31/23  0455   WBC Thousand/uL 9 01   HEMOGLOBIN g/dL 7 3*   HEMATOCRIT % 23 4*   PLATELETS Thousands/uL 166   NEUTROS PCT % 28*   LYMPHS PCT % 60*   MONOS PCT % 9   EOS PCT % 3     Results from last 7 days   Lab Units 03/31/23  0455   SODIUM mmol/L 139   POTASSIUM mmol/L 3 7   CHLORIDE mmol/L 99   CO2 mmol/L 35*   BUN mg/dL 11   CREATININE mg/dL 0 76   ANION GAP mmol/L 5   CALCIUM mg/dL 8 8   GLUCOSE RANDOM mg/dL 89     Results from last 7 days   Lab Units 03/30/23  1256   INR  1 07     Results from last 7 days   Lab Units 03/30/23  1259   POC GLUCOSE mg/dl 96         Results from last 7 days   Lab Units 03/31/23  0455 03/30/23  1343   LACTIC ACID mmol/L  --  0 4*   PROCALCITONIN ng/ml <0 05 <0 05       Lines/Drains:  Invasive Devices     Peripheral Intravenous Line  Duration           Peripheral IV 03/30/23 Right Antecubital <1 day          Drain  Duration           External Urinary Catheter <1 day                      Imaging: Reviewed radiology reports from this admission including: chest xray and chest CT scan    Recent Cultures (last 7 days):   Results from last 7 days   Lab Units 03/30/23  1343   BLOOD CULTURE  Received in Microbiology Lab  Culture in Progress  Received in Microbiology Lab  Culture in Progress         Last 24 Hours Medication List:   Current Facility-Administered Medications   Medication Dose Route Frequency Provider Last Rate   • acetaminophen  650 mg Oral Q6H PRN Max Lewis MD     • albuterol  2 puff Inhalation Q6H PRN Max Lewis MD     • aspirin  81 mg Oral Daily Max Lewis MD     • benzonatate  100 mg Oral TID PRN Max Lewis MD     • budesonide-formoterol  2 puff Inhalation BID Max Lewis MD     • carvedilol  6 25 mg Oral BID With Meals Andrea Duncan MD     • enoxaparin  40 mg Subcutaneous Daily Andrea Duncan MD     • escitalopram  20 mg Oral Daily Andrea Duncan MD     • ferrous sulfate  325 mg Oral Daily With Breakfast Andrea Duncan MD     • furosemide  20 mg Oral Daily Andrea Duncan MD     • guaiFENesin  600 mg Oral BID Andrea Duncan MD     • hydrochlorothiazide  25 mg Oral Daily Andrea Duncan MD     • HYDROcodone-acetaminophen  1 tablet Oral Q6H PRN Andrea Duncan MD     • levalbuterol  1 25 mg Nebulization Q8H PRN Andrea Duncan MD     • lisinopril  10 mg Oral Daily Andrea Duncan MD     • melatonin  3 mg Oral HS PRN Andrea Duncan MD     • nitroglycerin  0 4 mg Sublingual Q5 Min PRN Andrea Duncan MD     • ondansetron  4 mg Intravenous Q6H PRN Andrea Duncan MD     • pravastatin  20 mg Oral Daily With [facility] Felisha Weeks MD     • sodium chloride  3 mL Nebulization Q8H PRN Maciel Mg DO     • vilazodone  40 mg Oral Daily With Breakfast Andrea Duncan MD     • Zanubrutinib  160 mg Oral BID Andrea Duncan MD          Today, Patient Was Seen By: Jacey Olivo PA-C    **Please Note: This note may have been constructed using a voice recognition system  **

## 2023-03-31 NOTE — RESPIRATORY THERAPY NOTE
RT Protocol Note  Yamel Whitaker 69 y o  female MRN: [id number]  Unit/Bed#: -01 Encounter: [id number]    Assessment    Principal Problem:    Generalized weakness  Active Problems:    Essential hypertension    Tobacco abuse    Chronic respiratory failure with hypoxia ([facility])    Major depressive disorder, recurrent episode, moderate (HCC)    COPD (chronic obstructive pulmonary disease) ([facility])    Anemia    Staring episodes      Home Pulmonary Medications:     03/31/23 1602   Respiratory Protocol   Protocol Initiated? Yes   Protocol Selection Respiratory   Language Barrier? No   Medical & Social History Reviewed? Yes   Diagnostic Studies Reviewed? Yes   Physical Assessment Performed? Yes   Home Devices/Therapy Home O2   Respiratory Plan Home Bronchodilator Patient pathway   Respiratory Assessment   Assessment Type Pre-treatment   General Appearance Alert; Awake   Respiratory Pattern Normal   Chest Assessment Chest expansion symmetrical   Bilateral Breath Sounds Crackles   Resp Comments resp protocol complete, patient has HX copd, wears 4L O2 at home, takes nebs 3 times a day, bbs fine crackles at bases, respirations even and non labored, will continue home therapy nebs TID   O2 Device nc   Additional Assessments   Pulse 91   SpO2 94 %       Home Devices/Therapy: Home O2    Past Medical History:   Diagnosis Date    Acute congestive heart failure (HCC) 8/27/2021    Anxiety     Cardiac disease     Chest pain 8/27/2021    Chronic pain disorder     COPD (chronic obstructive pulmonary disease) ([facility])     Depression     Heart disease     Hyperlipidemia     Hypertension     MI (myocardial infarction) ([facility])     MRSA (methicillin resistant Staphylococcus aureus)     Renal disorder     benign kidney tumor     Social History     Socioeconomic History    Marital status:      Spouse name: Not on file    Number of children: 3    Years of education: 13    Highest education level: High school graduate   Occupational History "Occupation:      Employer: [employer]     Comment: retired    Tobacco Use    Smoking status: Every Day     Packs/day: 1 00     Years: 60 00     Pack years: 60 00     Types: Cigarettes    Smokeless tobacco: Never    Tobacco comments:     She has close to a 60 pack-year smoking history, most recently has cut down to 4-5 cigarettes daily   Vaping Use    Vaping Use: Never used   Substance and Sexual Activity    Alcohol use: Never    Drug use: No    Sexual activity: Not Currently   Other Topics Concern    Not on file   Social History Narrative    Not on file     Social Determinants of Health     Financial Resource Strain: Not on file   Food Insecurity: No Food Insecurity    Worried About Running Out of Food in the Last Year: Never true    [address] in the Last Year: Never true   Transportation Needs: No Transportation Needs    Lack of Transportation (Medical): No    Lack of Transportation (Non-Medical): No   Physical Activity: Not on file   Stress: Not on file   Social Connections: Not on file   Intimate Partner Violence: Not on file   Housing Stability: Unknown    Unable to Pay for Housing in the Last Year: Not on file    Number of Places Lived in the Last Year: 2    Unstable Housing in the Last Year: No       Subjective         Objective    Physical Exam:   Assessment Type: Pre-treatment  General Appearance: Alert, Awake  Respiratory Pattern: Normal  Chest Assessment: Chest expansion symmetrical  Bilateral Breath Sounds: Crackles  O2 Device: nc    Vitals:  Blood pressure 167/88, pulse 91, temperature 98 4 °F (36 9 °C), resp  rate 17, height 5' 7\" (1 702 m), weight 57 4 kg (126 lb 8 7 oz), SpO2 94 %, not currently breastfeeding            Imaging and other studies:     O2 Device: nc     Plan    Respiratory Plan: Home Bronchodilator Patient pathway        Resp Comments: resp protocol complete, patient has HX copd, wears 4L O2 at home, takes nebs 3 times a day, bbs fine crackles at " bases, respirations even and non labored, will continue home therapy nebs TID

## 2023-03-31 NOTE — PLAN OF CARE
Problem: OCCUPATIONAL THERAPY ADULT  Goal: Performs self-care activities at highest level of function for planned discharge setting  See evaluation for individualized goals  Description: Treatment Interventions: ADL retraining, Functional transfer training, UE strengthening/ROM, Endurance training, Patient/family training, Equipment evaluation/education, Compensatory technique education, Continued evaluation, Energy conservation, Activityengagement          See flowsheet documentation for full assessment, interventions and recommendations  Note: Limitation: Decreased ADL status, Decreased UE strength, Decreased endurance, Decreased self-care trans, Decreased high-level ADLs  Prognosis: Good  Assessment: Patient is a 69 y o  female seen for OT evaluation s/p admit to [address] [place] [address] on 3/30/2023 w/Generalized weakness  Commorbidities affecting patient's functional performance at time of assessment include: essential hypertension, tobacco abuse, chronic respiratory failure with hypoxia, major depressive disorder, COPD, anemia, and staring episodes  Orders placed for OT evaluation and treatment  Performed at least two patient identifiers during session including name and wristband  Prior to admission, Patient reports independent with ADLs and functional mobility with a Rollator  Patient lives in a one [facility], reports that her Son has been staying with her for the last couple of months  Personal factors affecting patient at time of initial evaluation include: limited caregiver support, steps to enter, limited insight into deficits, decreased initiation and engagement, difficulty performing ADLs and difficulty performing IADLs  Upon evaluation, patient requires minimal  assist for UB ADLs, moderate and maximal assist for LB ADLs    Occupational performance is affected by the following deficits: orientation, decreased functional use of BUEs, limited active ROM, decreased muscle strength, degenerative arthritic joint changes, impaired gross motor coordination, dynamic sit/ stand balance deficit with poor standing tolerance time for self care and functional mobility, decreased activity tolerance, impaired memory, impaired problem solving, decreased safety awareness and postural control and postural alignment deficit, requiring external assistance to complete transitional movements  Patient to benefit from continued Occupational Therapy treatment while in the hospital to address deficits as defined above and maximize level of functional independence with ADLs and functional mobility  Occupational Performance areas to address include: bathing/ shower, dressing, toilet hygiene, transfer to all surfaces, functional ambulation, functional mobility, emergency response, health maintenance, IADLs: safety procedures and Leisure Participation  From OT standpoint, recommendation at time of d/c would be Short Term Rehab       OT Discharge Recommendation: Post acute rehabilitation services

## 2023-03-31 NOTE — PHYSICAL THERAPY NOTE
"   Physical Therapy Evaluation     Patient's Name: Kady Holder    Admitting Diagnosis  Pneumonia [J18 9]  Weakness [R53 1]  Episodes of staring [R40 4]    Problem List  Patient Active Problem List   Diagnosis    Ambulatory dysfunction    Hypokalemia    Essential hypertension    Tobacco abuse    Chronic respiratory failure with hypoxia (HCC)    CAD (coronary artery disease), native coronary artery    Flank pain    Pneumonia    Sepsis ([facility] [address] )    Major depressive disorder, recurrent episode, moderate (HCC)    Fatigue    COPD (chronic obstructive pulmonary disease) ([facility])    SIRS (systemic inflammatory response syndrome) ([facility])    Anemia    Abnormal computed tomography angiography (CTA)    Severe protein-calorie malnutrition (HCC)    Positive blood culture    Waldenstrom macroglobulinemia ([facility])    Generalized weakness    Staring episodes     Past Medical History  Past Medical History:   Diagnosis Date    Ac[facility] congest[facility] ) 8/27/2021    Anxiety     Cardiac disease     Chest pain 8/27/2021    Chronic pain disorder     COPD (chronic obstructive pulmonary disease) ([facility])     Depression     Heart disease     Hyperlipidemia     Hypertension     MI (myocardial infarction) ([facility] )     MRSA (methicillin resistant Staphylococcus aureus)     Renal disorder     benign kidney tumor     Past Surgical History  Past Surgical History:   Procedure Laterality Date    APPENDECTOMY      ELBOW BURSA SURGERY Left 02/2018    D/T MRSA INFECTION    SPINAL FUSION      L7 L9      03/31/23 1057   PT Last Visit   PT Visit Date 03/31/23   Note Type   Note type Evaluation   Pain Assessment   Pain Assessment Tool 0-10   Pain Score 8   Pain Location/Orientation Orientation: Right  (lung)   Pain Onset/Description Other (Comment)  (\"when I cough\")   Hospital Pain Intervention(s) Repositioned; Ambulation/increased activity   Restrictions/Precautions   Weight Bearing Precautions Per Order No   Other Precautions Chair Alarm; Bed Alarm;O2;Fall " "Risk;Pain  (+3L O2 via NC)   Home Living   Type of [address] One level; Able to live on main level with bedroom/bathroom; Performs ADLs on one level;Stairs to enter with rails  (4 ADELINA)   Bathroom Shower/Tub Tub/shower unit   [employer] Raised   Bathroom Equipment Grab bars in shower; Shower chair;Grab bars around toilet   [address] Walker;Cane;Other (Comment)  (rollator; home O2)   Additional Comments Pt ambulates with a rollator  Prior Function   Level of [place] Independent with functional mobility; Independent with ADLs; Needs assistance with IADLS   Lives With Patrice Anju Help From Family   IADLs Family/Friend/Other provides transportation; Family/Friend/Other provides meals; Family/Friend/Other provides medication management   Falls in the last 6 months 0   Vocational Retired   General   Family/Caregiver Present Yes  (pt's son)   Cognition   Overall Cognitive Status WFL   Arousal/Participation Alert   Orientation Level Oriented X4   Memory Within functional limits   Following Commands Follows all commands and directions without difficulty   Comments Pt agreeable to PT  Subjective   Subjective \"I'm tired  \"   RLE Assessment   RLE Assessment X   Strength RLE   RLE Overall Strength 4-/5   LLE Assessment   LLE Assessment X   Strength LLE   LLE Overall Strength 4-/5   Light Touch   RLE Light Touch Grossly intact   LLE Light Touch Grossly intact   Bed Mobility   Supine to Sit 4  Minimal assistance  (CG assist)   Additional items Assist x 1;HOB elevated; Bedrails; Increased time required;Verbal cues;LE management   Transfers   Sit to Stand 4  Minimal assistance   Additional items Assist x 1; Increased time required;Verbal cues   Stand to Sit 4  Minimal assistance   Additional items Assist x 1; Armrests; Increased time required;Verbal cues   Ambulation/Elevation   Gait pattern Decreased toe off;Decreased heel strike;Decreased hip extension; Excessively " slow;Short stride; Shuffling   Gait Assistance 4  Minimal assist   Additional items Assist x 1;Verbal cues   Assistive Device Rolling walker   Distance 5 feet   Ambulation/Elevation Additional Comments Pt's ambulation distance limited secondary to fatigue  Balance   Static Sitting Good   Dynamic Sitting Fair +   Static Standing Fair -   Dynamic Standing Poor +   Ambulatory Poor +   Endurance Deficit   Endurance Deficit Yes   Endurance Deficit Description decreased activity tolerance   Activity Tolerance   Activity Tolerance Patient limited by fatigue   Medical Staff Made Aware OT Mick Marin  (Co-evaluation performed with OT secondary to complex medical condition of patient and regression of functional status from baseline  PT/OT goals were addressed separately )   Assessment   Prognosis Good   Problem List Decreased strength;Decreased endurance; Impaired balance;Decreased mobility;Pain   Assessment Pt is 69year old female seen for PT evaluation s/p admit to [facility] on 3/30/2023 with Generalized weakness  PT consulted to assess pt's functional mobility and discharge needs  Order placed for PT evaluation and treatment, with up and out of bed as tolerated order  Comorbidities affecting pt's physical performance at time of assessment include essential hypertension, tobacco abuse, chronic respiratory failure with hypoxia, major depressive disorder, COPD, anemia, and staring episodes  Prior to hospitalization, pt was independent with all functional mobility with a walker  Pt ambulates household and community distances  Pt resides with her son in a one level house with 4 steps to enter   Personal factors affecting pt at time of initial evaluation include stairs to enter home, ambulating with an assistive device, inability to ambulate household distances, inability to ambulate community distances, inability to navigate level surfaces without external assistance, unable to perform dynamic tasks in the community, difficulty performing ADLs, and inability to perform IADLs  Please find objective findings from PT assessment regarding body systems outlined above with impairments and limitations including weakness, impaired balance, decreased endurance, gait deviations, pain, decreased activity tolerance, decreased functional mobility tolerance, and fall risk  The following objective measures were performed on initial evaluation Barthel Index: 45/100, Modified Dennis: 4 (moderate/severe disability) and AM-PAC 6-Clicks: 74/65  Pt's clinical presentation is currently unstable/unpredictable seen in pt's presentation of need for ongoing medical management/monitoring, pt is a fall risk, and pt requires cues and assist for safety with functional mobility  Pt to benefit from continued PT treatment to address deficits as defined above and maximize pt's level of function and independence with mobility  From a PT standpoint, recommendation at time of discharge would be STR pending pt's progress in order to facilitate return to prior level of function  Barriers to Discharge Inaccessible home environment   Goals   STG Expiration Date 04/10/23   Short Term Goal #1 In 10 days: Increase bilateral LE strength 1/2 grade to facilitate independent mobility, Perform all bed mobility tasks modified independent to decrease caregiver burden, Perform all transfers modified independent to improve independence, Ambulate > 75 ft  with RW modified independent w/o LOB and w/ normalized gait pattern 100% of the time, Navigate 4 stairs modified independent with unilateral handrail to facilitate return to previous living environment and Increase all balance 1/2 grade to decrease risk for falls   Plan   Treatment/Interventions Functional transfer training;LE strengthening/ROM; Elevations; Therapeutic exercise; Endurance training;Patient/family training;Bed mobility;Gait training;OT   PT Frequency 3-5x/wk   Recommendation   PT Discharge Recommendation Post acute rehabilitation services   AM-PAC Basic Mobility Inpatient   Turning in Flat Bed Without Bedrails 3   Lying on Back to Sitting on Edge of Flat Bed Without Bedrails 3   Moving Bed to Chair 3   Standing Up From Chair Using Arms 3   Walk in Room 3   Climb 3-5 Stairs With Railing 2   Basic Mobility Inpatient Raw Score 17   Basic Mobility Standardized Score 39 67   Highest Level Of Mobility   -[facility] Goal 5: Stand one or more mins   -[facility] Achieved 4: Move to chair/commode   Modified [place] Scale   Modified Dennis Scale 4   Barthel Index   Feeding 10   Bathing 0   Grooming Score 0   Dressing Score 5   Bladder Score 5   Bowels Score 10   Toilet Use Score 5   Transfers (Bed/Chair) Score 10   Mobility (Level Surface) Score 0   Stairs Score 0   Barthel Index Score 45     PT Evaluation Time: 4125-6642    Carolyn Becerril, PT, DPT

## 2023-03-31 NOTE — OCCUPATIONAL THERAPY NOTE
"          Occupational Therapy Evaluation        Patient Name: Kel Peoples  AZFOM'X Date: 3/31/2023       03/31/23 1112   OT Last Visit   OT Visit Date 03/31/23   Note Type   Note type Evaluation   Pain Assessment   Pain Assessment Tool 0-10   Pain Score 8   Pain Location/Orientation Orientation: Right  (\"lung\")   Pain Onset/Description Other (Comment)  (\"when I cough\")   Hospital Pain Intervention(s) Repositioned; Ambulation/increased activity   Restrictions/Precautions   Weight Bearing Precautions Per Order No   Other Precautions Chair Alarm; Bed Alarm;O2;Fall Risk;Pain  (+3L O2 via NC)   Home Living   Type of [address] [place] [address] One level; Able to live on main level with bedroom/bathroom; Performs ADLs on one level;Stairs to enter with rails  (4 ADELINA)   Bathroom Shower/Tub Tub/shower unit   [employer] Raised   Bathroom Equipment Grab bars in shower; Shower chair;Grab bars around toilet   [address] Walker;Cane;Other (Comment)  (rollator; home O2)   Additional Comments Pt ambulates with a rollator  Prior Function   Level of [place] Independent with functional mobility; Independent with ADLs; Needs assistance with IADLS   Lives With Jhonny Cary Help From Family   IADLs Family/Friend/Other provides transportation; Family/Friend/Other provides meals; Family/Friend/Other provides medication management   Falls in the last 6 months 0   Vocational Retired   Lifestyle   Autonomy Patient reports independent with ADLs and functional mobility with a Rollator  Patient lives in a one story house, 4 [address], reports that her Son has been staying with her for the last couple of months     Reciprocal Relationships Supportive Son   Service to Others Retired   General   Family/Caregiver Present No   ADL   Where Assessed   (Assist levels for self care tasks,  are based on functional assessment of performance skills and deficits observed during functional mobility assessement)   Eating " Assistance 5  Supervision/Setup   Grooming Assistance 5  Supervision/Setup   UB Bathing Assistance 4  Minimal Assistance   LB Bathing Assistance 4  Minimal Assistance   UB Dressing Assistance 5  Supervision/Setup   LB Dressing Assistance 3  Moderate Assistance   Toileting Assistance  3  Moderate Assistance   Functional Assistance 3  Moderate Assistance   Bed Mobility   Supine to Sit 4  Minimal assistance   Additional items Assist x 1;HOB elevated; Bedrails; Increased time required;Verbal cues;LE management   Transfers   Sit to Stand 4  Minimal assistance   Additional items Assist x 1; Increased time required;Verbal cues   Stand to Sit 4  Minimal assistance   Additional items Assist x 1; Armrests; Increased time required;Verbal cues   Balance   Static Sitting Good   Dynamic Sitting Fair +   Static Standing Fair -   Dynamic Standing Poor +   Ambulatory Poor +   Activity Tolerance   Activity Tolerance Patient limited by fatigue   Medical Staff Made Aware Pt seen as a co-eval with PT due to the patient's co-morbidities, clinically unstable presentation, and present impairments which are a regression from the patient's baseline  RUE Assessment   RUE Assessment X  (limited overhead movements, strength deficit)   RUE Strength   RUE Overall Strength Deficits   LUE Assessment   LUE Assessment X  (limited overhead movements, strength deficit;)   LUE Strength   LUE Overall Strength Deficits  (3/5)   Hand Function   Gross Motor Coordination Functional   Fine Motor Coordination Functional   Sensation   Light Touch No apparent deficits  (BUEs)   Vision-Basic Assessment   Current Vision Wears glasses only for reading   Perception   Inattention/Neglect Appears intact   Cognition   Overall Cognitive Status WFL   Arousal/Participation Alert; Responsive; Cooperative   Attention Within functional limits   Orientation Level Oriented X4   Memory Within functional limits   Following Commands Follows all commands and directions without difficulty   Assessment   Limitation Decreased ADL status; Decreased UE strength;Decreased endurance;Decreased self-care trans;Decreased high-level ADLs   Prognosis Good   Assessment Patient is a 69 y o  female seen for OT evaluation s/p admit to [id number] [place] [address] on 3/30/2023 w/Generalized weakness  Commorbidities affecting patient's functional performance at time of assessment include: essential hypertension, tobacco abuse, chronic respiratory failure with hypoxia, major depressive disorder, COPD, anemia, and staring episodes  Orders placed for OT evaluation and treatment  Performed at least two patient identifiers during session including name and wristband  Prior to admission, Patient reports independent with ADLs and functional mobility with a Rollator  Patient lives in a one story house, 4 [address], reports that her Son has been staying with her for the last couple of months  Personal factors affecting patient at time of initial evaluation include: limited caregiver support, steps to enter, limited insight into deficits, decreased initiation and engagement, difficulty performing ADLs and difficulty performing IADLs  Upon evaluation, patient requires minimal  assist for UB ADLs, moderate and maximal assist for LB ADLs  Occupational performance is affected by the following deficits: orientation, decreased functional use of BUEs, limited active ROM, decreased muscle strength, degenerative arthritic joint changes, impaired gross motor coordination, dynamic sit/ stand balance deficit with poor standing tolerance time for self care and functional mobility, decreased activity tolerance, impaired memory, impaired problem solving, decreased safety awareness and postural control and postural alignment deficit, requiring external assistance to complete transitional movements    Patient to benefit from continued Occupational Therapy treatment while in the hospital to address deficits as defined above and maximize level of functional independence with ADLs and functional mobility  Occupational Performance areas to address include: bathing/ shower, dressing, toilet hygiene, transfer to all surfaces, functional ambulation, functional mobility, emergency response, health maintenance, IADLs: safety procedures and Leisure Participation  From OT standpoint, recommendation at time of d/c would be Short Term Rehab  Plan   Treatment Interventions ADL retraining;Functional transfer training;UE strengthening/ROM; Endurance training;Patient/family training;Equipment evaluation/education; Compensatory technique education;Continued evaluation; Energy conservation; Activityengagement   Goal Expiration Date 04/13/23   OT Frequency 3-5x/wk   Recommendation   OT Discharge Recommendation Post acute rehabilitation services   AM-PAC Daily Activity Inpatient   Lower Body Dressing 2   Bathing 2   Toileting 3   Upper Body Dressing 3   Grooming 3   Eating 4   Daily Activity Raw Score 17   Daily Activity Standardized Score (Calc for Raw Score >=11) 37 26   AM-PAC Applied Cognition Inpatient   Following a Speech/Presentation 4   Understanding Ordinary Conversation 4   Taking Medications 2   Remembering Where Things Are Placed or Put Away 2   Remembering List of 4-5 Errands 2   Taking Care of Complicated Tasks 1   Applied Cognition Raw Score 15   Applied Cognition Standardized Score 33 54   Barthel Index   Feeding 10   Bathing 0   Grooming Score 0   Dressing Score 5   Bladder Score 5   Bowels Score 10   Toilet Use Score 5   Transfers (Bed/Chair) Score 10   Mobility (Level Surface) Score 0   Stairs Score 0   Barthel Index Score 45       1 - Patient will verbalize and demonstrate use of energy conservation/ deep breathing technique and work simplification skills during functional activity with no verbal cues  2 - Patient will verbalize and demonstrate good body mechanics and joint protection techniques during  ADLs/ IADLs with no verbal cues      3 - Patient will increase OOB/ sitting tolerance to 2-4 hours per day for increased participation in self care and leisure tasks with no s/s of exertion  4 - Patient will increase standing tolerance time to 5  minutes with unilateral UE support to complete sink level ADLs@ mod I level  5 - Patient will increase sitting tolerance at edge of bed to 20 minutes to complete UB ADLs @ set up assist level  6 - Patient will transfer bed to Chair / toilet at Set up assist level with AD as indicated  7 - Patient will complete UB ADLs with set up assist      8 - Patient will complete LB ADLs with min assist with the use of adaptive equipment       9 - Patient will complete toileting hygiene with set up assist/ supervision for thoroughness    10 - Patient/ Family  will demonstrate competency with UE Home Exercise Program

## 2023-03-31 NOTE — PLAN OF CARE
Problem: PHYSICAL THERAPY ADULT  Goal: Performs mobility at highest level of function for planned discharge setting  See evaluation for individualized goals  Description: Treatment/Interventions: Functional transfer training, LE strengthening/ROM, Elevations, Therapeutic exercise, Endurance training, Patient/family training, Bed mobility, Gait training, OT          See flowsheet documentation for full assessment, interventions and recommendations  Note: Prognosis: Good  Problem List: Decreased strength, Decreased endurance, Impaired balance, Decreased mobility, Pain  Assessment: Pt is 69year old female seen for PT evaluation s/p admit to [facility] on 3/30/2023 with Generalized weakness  PT consulted to assess pt's functional mobility and discharge needs  Order placed for PT evaluation and treatment, with up and out of bed as tolerated order  Comorbidities affecting pt's physical performance at time of assessment include essential hypertension, tobacco abuse, chronic respiratory failure with hypoxia, major depressive disorder, COPD, anemia, and staring episodes  Prior to hospitalization, pt was independent with all functional mobility with a walker  Pt ambulates household and community distances  Pt resides with her son in a one level house with 4 steps to enter  Personal factors affecting pt at time of initial evaluation include stairs to enter home, ambulating with an assistive device, inability to ambulate household distances, inability to ambulate community distances, inability to navigate level surfaces without external assistance, unable to perform dynamic tasks in the community, difficulty performing ADLs, and inability to perform IADLs   Please find objective findings from PT assessment regarding body systems outlined above with impairments and limitations including weakness, impaired balance, decreased endurance, gait deviations, pain, decreased activity tolerance, decreased functional mobility tolerance, and fall risk  The following objective measures were performed on initial evaluation Barthel Index: 45/100, Modified Dennis: 4 (moderate/severe disability) and AM-PAC 6-Clicks: 14/04  Pt's clinical presentation is currently unstable/unpredictable seen in pt's presentation of need for ongoing medical management/monitoring, pt is a fall risk, and pt requires cues and assist for safety with functional mobility  Pt to benefit from continued PT treatment to address deficits as defined above and maximize pt's level of function and independence with mobility  From a PT standpoint, recommendation at time of discharge would be STR pending pt's progress in order to facilitate return to prior level of function  Barriers to Discharge: Inaccessible home environment     PT Discharge Recommendation: Post acute rehabilitation services    See flowsheet documentation for full assessment

## 2023-03-31 NOTE — ASSESSMENT & PLAN NOTE
· Reports generalized weakness, feeling down and concerned regarding multiple hospitalizations recently   Was just in the hospital around 2 weeks ago for COPD exacerbation, reports went to rehab and eventually went home to live with son  · Suspect primarily deconditioning due to multiple hospitalizations, progression of underlying chronic illnesses, with possible component of noncompliance with medications, especially her antidepressants  · PT/OT evaluation-postacute rehab

## 2023-04-01 ENCOUNTER — APPOINTMENT (INPATIENT)
Dept: RADIOLOGY | Facility: HOSPITAL | Age: 78
End: 2023-04-01

## 2023-04-01 LAB
ANION GAP SERPL CALCULATED.3IONS-SCNC: 9 MMOL/L (ref 4–13)
BUN SERPL-MCNC: 13 MG/DL (ref 5–25)
CALCIUM SERPL-MCNC: 8.9 MG/DL (ref 8.4–10.2)
CHLORIDE SERPL-SCNC: 96 MMOL/L (ref 96–108)
CO2 SERPL-SCNC: 33 MMOL/L (ref 21–32)
CREAT SERPL-MCNC: 0.74 MG/DL (ref 0.6–1.3)
ERYTHROCYTE [DISTWIDTH] IN BLOOD BY AUTOMATED COUNT: 14.4 % (ref 11.6–15.1)
GFR SERPL CREATININE-BSD FRML MDRD: 78 ML/MIN/1.73SQ M
GLUCOSE SERPL-MCNC: 86 MG/DL (ref 65–140)
HCT VFR BLD AUTO: 26.4 % (ref 34.8–46.1)
HGB BLD-MCNC: 8.2 G/DL (ref 11.5–15.4)
MCH RBC QN AUTO: 29.6 PG (ref 26.8–34.3)
MCHC RBC AUTO-ENTMCNC: 31.1 G/DL (ref 31.4–37.4)
MCV RBC AUTO: 95 FL (ref 82–98)
PLATELET # BLD AUTO: 204 THOUSANDS/UL (ref 149–390)
PMV BLD AUTO: 9.5 FL (ref 8.9–12.7)
POTASSIUM SERPL-SCNC: 3.4 MMOL/L (ref 3.5–5.3)
RBC # BLD AUTO: 2.77 MILLION/UL (ref 3.81–5.12)
SODIUM SERPL-SCNC: 138 MMOL/L (ref 135–147)
WBC # BLD AUTO: 10.31 THOUSAND/UL (ref 4.31–10.16)

## 2023-04-01 RX ADMIN — FERROUS SULFATE TAB 325 MG (65 MG ELEMENTAL FE) 325 MG: 325 (65 FE) TAB at 07:31

## 2023-04-01 RX ADMIN — ASPIRIN 81 MG: 81 TABLET ORAL at 08:15

## 2023-04-01 RX ADMIN — CARVEDILOL 6.25 MG: 6.25 TABLET, FILM COATED ORAL at 17:44

## 2023-04-01 RX ADMIN — GUAIFENESIN 600 MG: 600 TABLET, EXTENDED RELEASE ORAL at 17:47

## 2023-04-01 RX ADMIN — CARVEDILOL 6.25 MG: 6.25 TABLET, FILM COATED ORAL at 07:31

## 2023-04-01 RX ADMIN — ENOXAPARIN SODIUM 40 MG: 40 INJECTION SUBCUTANEOUS at 08:15

## 2023-04-01 RX ADMIN — IPRATROPIUM BROMIDE 0.5 MG: 0.5 SOLUTION RESPIRATORY (INHALATION) at 19:19

## 2023-04-01 RX ADMIN — LEVALBUTEROL HYDROCHLORIDE 1.25 MG: 1.25 SOLUTION, CONCENTRATE RESPIRATORY (INHALATION) at 19:19

## 2023-04-01 RX ADMIN — ZANUBRUTINIB 160 MG: 80 CAPSULE, GELATIN COATED ORAL at 21:56

## 2023-04-01 RX ADMIN — PRAVASTATIN SODIUM 20 MG: 20 TABLET ORAL at 17:47

## 2023-04-01 RX ADMIN — LEVALBUTEROL HYDROCHLORIDE 1.25 MG: 1.25 SOLUTION, CONCENTRATE RESPIRATORY (INHALATION) at 13:50

## 2023-04-01 RX ADMIN — VILAZODONE HYDROCHLORIDE 40 MG: 20 TABLET ORAL at 07:32

## 2023-04-01 RX ADMIN — FUROSEMIDE 20 MG: 20 TABLET ORAL at 08:16

## 2023-04-01 RX ADMIN — HYDROCODONE BITARTRATE AND ACETAMINOPHEN 1 TABLET: 5; 325 TABLET ORAL at 07:38

## 2023-04-01 RX ADMIN — LEVALBUTEROL HYDROCHLORIDE 1.25 MG: 1.25 SOLUTION, CONCENTRATE RESPIRATORY (INHALATION) at 07:11

## 2023-04-01 RX ADMIN — LISINOPRIL 10 MG: 10 TABLET ORAL at 08:15

## 2023-04-01 RX ADMIN — BUDESONIDE AND FORMOTEROL FUMARATE DIHYDRATE 2 PUFF: 160; 4.5 AEROSOL RESPIRATORY (INHALATION) at 08:24

## 2023-04-01 RX ADMIN — HYDROCODONE BITARTRATE AND ACETAMINOPHEN 1 TABLET: 5; 325 TABLET ORAL at 17:43

## 2023-04-01 RX ADMIN — GUAIFENESIN 600 MG: 600 TABLET, EXTENDED RELEASE ORAL at 08:19

## 2023-04-01 RX ADMIN — BUDESONIDE AND FORMOTEROL FUMARATE DIHYDRATE 2 PUFF: 160; 4.5 AEROSOL RESPIRATORY (INHALATION) at 17:47

## 2023-04-01 RX ADMIN — ZANUBRUTINIB 160 MG: 80 CAPSULE, GELATIN COATED ORAL at 07:31

## 2023-04-01 RX ADMIN — IPRATROPIUM BROMIDE 0.5 MG: 0.5 SOLUTION RESPIRATORY (INHALATION) at 13:50

## 2023-04-01 RX ADMIN — ESCITALOPRAM OXALATE 20 MG: 10 TABLET ORAL at 08:16

## 2023-04-01 RX ADMIN — HYDROCHLOROTHIAZIDE 25 MG: 25 TABLET ORAL at 08:15

## 2023-04-01 RX ADMIN — IPRATROPIUM BROMIDE 0.5 MG: 0.5 SOLUTION RESPIRATORY (INHALATION) at 07:11

## 2023-04-01 NOTE — PHYSICAL THERAPY NOTE
Physical Therapy Cancellation     Patient's Name: Jose Manuel Zimmerman    Admitting Diagnosis  Pneumonia [J18 9]  Weakness [R53 1]  Episodes of staring [R40 4]    Problem List  Patient Active Problem List   Diagnosis    Ambulatory dysfunction    Hypokalemia    Essential hypertension    Tobacco abuse    Chronic respiratory failure with hypoxia (HCC)    CAD (coronary artery disease), native coronary artery    Flank pain    Pneumonia    Sepsis ([facility] [address] )    Major depressive disorder, recurrent episode, moderate (HCC)    Fatigue    COPD (chronic obstructive pulmonary disease) (HCC)    SIRS (systemic inflammatory response syndrome) ([facility])    Anemia    Abnormal computed tomography angiography (CTA)    Severe protein-calorie malnutrition (HCC)    Positive blood culture    Waldenstrom macroglobulinemia (HCC)    Generalized weakness    Staring episodes       Past Medical History  Past Medical History:   Diagnosis Date    Acute congestive heart failure ([facility] [address] ) 8/27/2021    Anxiety     Cardiac disease     Chest pain 8/27/2021    Chronic pain disorder     COPD (chronic obstructive pulmonary disease) ([facility])     Depression     Heart disease     Hyperlipidemia     Hypertension     MI (myocardial infarction) ([facility] [address] )     MRSA (methicillin resistant Staphylococcus aureus)     Renal disorder     benign kidney tumor       Past Surgical History  Past Surgical History:   Procedure Laterality Date    APPENDECTOMY      ELBOW BURSA SURGERY Left 02/2018    D/T MRSA INFECTION    SPINAL FUSION      L7 L9        04/01/23 1337   PT Last Visit   PT Visit Date 04/01/23   Note Type   Note Type Cancelled Session   Cancel Reasons Refusal  (Chart review performed  At this time, PT treatment session cancelled per pt refusal; pt reports she just got back from walking with [facility] staff   PT will follow and provide PT interventions as appropriate )             Violetta Denis, PT, DPT

## 2023-04-01 NOTE — CASE MANAGEMENT
Case Management Assessment & Discharge Planning Note    Patient name Deloris Simmons  Location [address] 305/-01 MRN [id number]  : 1945 Date 3/31/2023       Current Admission Date: 3/30/2023  Current Admission Diagnosis:Generalized weakness   Patient Active Problem List    Diagnosis Date Noted   • Generalized weakness 2023   • Staring episodes 2023   • Waldenstrom macroglobulinemia ([address] ) 2023   • Severe protein-calorie malnutrition ([address] ) 2021   • Positive blood culture 2021   • COPD (chronic obstructive pulmonary disease) ([address] ) 2021   • SIRS (systemic inflammatory response syndrome) ([address] ) 2021   • Anemia 2021   • Abnormal computed tomography angiography (CTA) 2021   • Fatigue 2021   • Major depressive disorder, recurrent episode, moderate ([address] ) 2019   • Flank pain 2019   • Pneumonia 2019   • Sepsis ([address] ) 2019   • CAD (coronary artery disease), native coronary artery 2018   • Chronic respiratory failure with hypoxia ([address] ) 2018   • Ambulatory dysfunction 2018   • Hypokalemia 2018   • Essential hypertension 2018   • Tobacco abuse 2018      LOS (days): 0  Geometric Mean LOS (GMLOS) (days): 6 20  Days to GMLOS:5 8     OBJECTIVE:  PATIENT READMITTED TO HOSPITAL  Risk of Unplanned Readmission Score: 26 59         Current admission status: Inpatient       Preferred Pharmacy:   [facility] [address]  Phone: [phone] Fax: [phone], [address] [address] [address] [place] [address]  Phone: [phone] Fax: [phone]    Primary Care Provider: Leighton Belle MD    Primary Insurance: MEDICARE  Secondary Insurance: Myrna Butcher 118:  [address] [facility], [address] [facility] "Phone: [phone] (Home)                         Readmission Root Cause  30 Day Readmission: Yes  Who directed you to return to the hospital?: Family  Patient was readmitted due to: generalized weakness    Patient Information  Admitted from[de-identified] Home  Mental Status: Other (Comment) (pt w flat affect, staring into space; responded to her name, how are you inquiry, but did not engage in conversation )  During Assessment patient was accompanied by: Son  Assessment information provided by[de-identified] Son  Primary Caregiver: Self  Support Systems: [address] of Residence: [address] do you live in?: [place], [place]; currently staying w son Claudell Roan in [facility] entry access options   Select all that apply : Stairs  Number of steps to enter home : 3  Do the steps have railings?: Yes  Type of Current Residence: [place]  In the last 12 months, was there a time when you were not able to pay the mortgage or rent on time?: No  In the last 12 months, how many places have you lived?: 2  In the last 12 months, was there a time when you did not have a steady place to sleep or slept in a shelter (including now)?: No  Homeless/housing insecurity resource given?: N/A  Living Arrangements: Lives w/ Son, Other (Comment) (at present time;  usually lives alone in own home)  Is patient a ?: No    Activities of Daily Living Prior to Admission  Functional Status: Assistance  Completes ADLs independently?: No  Level of ADL dependence: Assistance  Ambulates independently?: Yes  Does patient use assisted devices?: Yes  Assisted Devices (DME) used: Walker, Bedside Commode, Portable Oxygen tanks, Home Oxygen concentrator, Other (Comment), Nebulizer (tub bench)  DME Company Name (respiratory supplies): \"someplace in [place]\" per son  Does patient currently own DME?: Yes  What DME does the patient currently own?: Ruma Orozco, Hospital Bed, Home Oxygen concentrator, Portable Oxygen tanks, Other (Comment) (tub bench; hospital bed is at her " home, not at son's)  Does patient have a history of Outpatient Therapy (PT/OT)?: No  Does the patient have a history of Short-Term Rehab?: Yes ([facility] in Jan 23)  Does patient have a history of HHC?: Yes  Does patient currently have [facility]?: Yes (current w [facility])    [address] [facility]  Type of Current Home Care Services: Nurse visit, Home PT, Home OT  [address][de-identified] [address] Provider[de-identified] PCP    Patient Information Continued  Income Source: SSI/SSD  Does patient have prescription coverage?: Yes  Within the past 12 months, you worried that your food would run out before you got the money to buy more : Never true  Within the past 12 months, the food you bought just didn't last and you didn't have money to get more : Never true  Food insecurity resource given?: N/A  Does patient receive dialysis treatments?: No  Does patient have a history of substance abuse?: No  Does patient have a history of Mental Health Diagnosis?: Yes (major depressive d/o)  Is patient receiving treatment for mental health?: Yes  Has patient received inpatient treatment related to mental health in the last 2 years?: No         Means of Transportation  Means of Transport to Appts[de-identified] Family transport  In the past 12 months, has lack of transportation kept you from medical appointments or from getting medications?: No  In the past 12 months, has lack of transportation kept you from meetings, work, or from getting things needed for daily living?: No  Was application for public transport provided?: N/A        DISCHARGE DETAILS:    Discharge planning discussed with[de-identified] son Minna Fernando of Choice: Yes  Comments - Freedom of Choice: Met w pt and son, introduced self and explained role of CM, including arranging DME, STR, home health, out pt tx   Advised pt/family that CM will make appropriate referrals based on treatment team recommendations and MD orders, and they can consider recommended plan of "care and choose from available vendors  Pt non contributory to conversation, son anxious, loud, weeping at times, repeatedly asking for help to take care of his mother  Per son, pt has been staying w him since her d/c from [facility] 9 weeks ago  He has taken a 10 month family leave which is ending soon  Pt normally resides in [facility], was associated w AAA there  In addition to home health for PT, OT, nsg, pt had waiver services that provided a HHA M/W/F, 5 hrs per day, also MOW  Son says he is interested in the same services at his home through [facility], and says he \" has been trying for seven weeks to get someone to assist\" him from [facility]  Son reports pt is due for an evaluation for services by [facility] on Monday, 4/3  Son reports his mother does her own med administration; he said he recently got a med list from pt's PCP and decided to look through her med box  He says the meds \"did not line up\", and he found she had not been taking her two psychiatric medications  Son blames pt's present condition of staring into space, not interacting, minimally verbal w her lack of meds, want's help to prevent it from happening again  Implied that lack of services in community is the reason for pt's presentation  Suggested he prepare and give pt medications; son's response was always \"I need help, who is going to help me take care of her? \"  Says he has reached out to home health and pt's PCP and both said they could not help him  Son requesting someone arrange for pt to get blister pack med prep so it will be easier for her to take her meds, says he has been told the [address] has the service, and his father had med packs prepared at [facility] which would be more convenient for him  Suggested he call pt's pharmacy and ask them to transfer Rxs to [facility]; he says there are \"insurance issues\" and he has been unsuccessful in completing the task   Son also wants pt to have an iron infusion while " in the hospital, says she is scheduled for same on 4/11  Son also requesting pt be seen by a psychiatrist regarding her medications  Son says he has s/w PA Teagan Corona re: all his issues  This CM s/w Teagan Corona and he reports he will f/u tomorrow w pt and son  (cont'd)  CM contacted family/caregiver?: Yes  Were Treatment Team discharge recommendations reviewed with patient/caregiver?: No (none at time of interview)          Contacts  Patient Contacts: Shaniqua Joshi (Son) [phone] [facility] Phone)  Relationship to Patient[de-identified] Family  Contact Method: In Person  Reason/Outcome: Continuity of Care, Discharge [address]         Is the patient interested in Scout Jacob at discharge?: Yes  [address] requested[de-identified] [address], Nursing, Medical Social Work, Occupational Therapy, Physical [address] Agency Name[de-identified] [address] Provider[de-identified] PCP  Home Health Services Needed[de-identified] Evaluate Functional Status and Safety, Gait/ADL Training, Oxygen Via Nasal Cannula, Strengthening/Theraputic Exercises to Improve Function, Other (comment) (med management)  Homebound Criteria Met[de-identified] Requires the Assistance of Another Person for Safe Ambulation or to Leave the Home, Uses an Assist Device (i e  cane, walker, etc)  Supporting Clincal Findings[de-identified] Fatigues Easliy in [employer], Limited Endurance, Requires Oxygen    DME Referral Provided  Referral made for DME?: No    Other Referral/Resources/Interventions Provided:  Referral Comments: (cont'd)  T/C to [email or web address] on son's behalf  Conda Cowden there said anyone can get med packs made  Pts have to present all new Rxs at the same time OR have their current Rxs transferred to [email or web address]  As long as Rxs have refills, they will prepare them in packs  Only 30 days supply are given at any time  Provided Conda Cowden w pt's present pharmacy, [facility] [address]; she said she would call and have the Rxs transferred to her pharmacy   Conda Cowden says meds will be prepared Monday or Tuesday  Referral made in [address] to [facility], pt's current home health agency  Referral also made to Art[facility] Bennett as PT/OT today are recommending STR placement for pt  Son reports that pt was there for one month and both of them were extremely pleased w pt's care  Son says pt was interactive there, joking and laughing, dancing and singing w Sukhi impersonator and enjoying all activities  CM to discuss d/c options and recommendations w son, pt  Note left for w/e CM Pamela  Treatment Team Recommendation: Short Term Rehab  Discharge Destination Plan[de-identified] Other (TBD)  Transport at Discharge :  Other (Comment) (TBD)

## 2023-04-01 NOTE — CASE MANAGEMENT
Case Management Assessment & Discharge Planning Note    Patient name Yamel Whitaker  Location [address] 305/-01 MRN [id number]  : 1945 Date 3/31/2023       Current Admission Date: 3/30/2023  Current Admission Diagnosis:Generalized weakness   Patient Active Problem List    Diagnosis Date Noted   • Generalized weakness 2023   • Staring episodes 2023   • Waldenstrom macroglobulinemia ([address] ) 2023   • Severe protein-calorie malnutrition ([address] ) 2021   • Positive blood culture 2021   • COPD (chronic obstructive pulmonary disease) ([address] ) 2021   • SIRS (systemic inflammatory response syndrome) ([address] ) 2021   • Anemia 2021   • Abnormal computed tomography angiography (CTA) 2021   • Fatigue 2021   • Major depressive disorder, recurrent episode, moderate ([address] ) 2019   • Flank pain 2019   • Pneumonia 2019   • Sepsis ([address] ) 2019   • CAD (coronary artery disease), native coronary artery 2018   • Chronic respiratory failure with hypoxia ([address] ) 2018   • Ambulatory dysfunction 2018   • Hypokalemia 2018   • Essential hypertension 2018   • Tobacco abuse 2018      LOS (days): 0  Geometric Mean LOS (GMLOS) (days): 6 20  Days to GMLOS:5 8     OBJECTIVE:  PATIENT READMITTED TO HOSPITAL  Risk of Unplanned Readmission Score: 26 59         Current admission status: Inpatient       Preferred Pharmacy:   [facility] [place] [address] Drive  Phone: [phone] Fax: [phone], [address] [address] [address] [place] [address]  Phone: [phone] Fax: [phone]    Primary Care Provider: Simona Waddell MD    Primary Insurance: MEDICARE  Secondary Insurance: Myrna Butcher 118:  [address] [facility], [address] [facility] Son   Primary Phone: [phone] (Home)                                                                DISCHARGE DETAILS:

## 2023-04-01 NOTE — PROGRESS NOTES
[facility]  Progress Note  Name: Malachi Guerrero  MRN: [id number]  Unit/Bed#: -01 I Date of Admission: 3/30/2023   Date of Service: 4/1/2023 I Hospital Day: 1    Assessment/Plan   * Generalized weakness  Assessment & Plan  · Reports generalized weakness, feeling down and concerned regarding multiple hospitalizations recently  Was just in the hospital around 2 weeks ago for COPD exacerbation, reports went to rehab and eventually went home to live with son  · Suspect primarily deconditioning due to multiple hospitalizations, progression of underlying chronic illnesses, with possible component of noncompliance with medications, especially her antidepressants  · PT/OT evaluation-postacute rehab    Staring episodes  Assessment & Plan  · Reported staring episodes at home, son concerned regarding stroke or seizure  Initially presented as a stroke alert, CTA and CT head obtained  · Appreciate Neurology recommendations  · No need for stroke work up  · Stressed importance of compliance with meds, continue home ASA and statin  · Consider EEG, patient not interested at this time, consider outpatient    COPD (chronic obstructive pulmonary disease) ([facility] )  Assessment & Plan  · Recently hospitalized for exacerbation, currently does not have any shortness of breath, no wheezing    Stable on chronic 4 LNC at baseline  · Recently finished a course of prednisone  · Does not appear to be in exacerbation at this time  · Continue home inhalers, nebs    Major depressive disorder, recurrent episode, moderate ([facility] )  Assessment & Plan  · Suspect contributing to current symptoms  · Patient endorses that she is not compliant with prescribed medications  · Offered psychiatry evaluation, however patient is refusing at this time  · Continue home Lexapro, Viibryd    Chronic respiratory failure with hypoxia ([facility] )  Assessment & Plan  · On 4 LNC at baseline, stable at baseline O2 needs  · Does not have any wheezing on examination  · Pulmonary hygiene  · Noted to have suspected pneumonia on CT scan, given 1 dose of IV Ceftriaxone in the ED  No fevers/chills, no worsening cough/sputum production, no change in O2 baseline needs  · procalcitonin WNL, no leukocytosis  · Repeat chest x-ray pending official read  · Monitor off antibiotics    Tobacco abuse  Assessment & Plan  · Discussed regarding cessation for 5 minutes  · Offered nicotine patch    Essential hypertension  Assessment & Plan  · Continue home Coreg, enalapril, Lasix, HCTZ  · Monitor BP           VTE Pharmacologic Prophylaxis: VTE Score: 3 Moderate Risk (Score 3-4) - Pharmacological DVT Prophylaxis Ordered: enoxaparin (Lovenox)  Patient Centered Rounds: I performed bedside rounds with nursing staff today  Discussions with Specialists or Other Care Team Provider: Jelani    Education and Discussions with Family / Patient: Updated  (son) at bedside  Total Time Spent on Date of Encounter in care of patient: 45 minutes This time was spent on one or more of the following: performing physical exam; counseling and coordination of care; obtaining or reviewing history; documenting in the medical record; reviewing/ordering tests, medications or procedures; communicating with other healthcare professionals and discussing with patient's family/caregivers  Current Length of Stay: 1 day(s)  Current Patient Status: Inpatient   Certification Statement: The patient will continue to require additional inpatient hospital stay due to Awaiting placement  Discharge Plan: Anticipate discharge in 24-48 hrs to rehab facility  Code Status: Level 3 - DNAR and DNI    Subjective:   Patient reports feeling depressed  She has no motivation to eat or exercise  She denies chest pressure/pain, palpitations, lightheadedness, nausea, shortness of breath, or chills      Objective:     Vitals:   Temp (24hrs), Av 4 °F (36 9 °C), Min:97 9 °F (36 6 °C), Max:98 8 °F (37 1 °C)    Temp: [97 9 °F (36 6 °C)-98 8 °F (37 1 °C)] 98 8 °F (37 1 °C)  HR:  [] 107  BP: (125-152)/(73-96) 125/85  SpO2:  [88 %-94 %] 94 %  Body mass index is 19 82 kg/m²  Input and Output Summary (last 24 hours): Intake/Output Summary (Last 24 hours) at 4/1/2023 1519  Last data filed at 4/1/2023 1515  Gross per 24 hour   Intake 440 ml   Output 700 ml   Net -260 ml       Physical Exam:   Physical Exam  Vitals and nursing note reviewed  Constitutional:       Appearance: She is normal weight  HENT:      Head: Normocephalic  Nose: Nose normal       Mouth/Throat:      Mouth: Mucous membranes are moist       Pharynx: Oropharynx is clear  Eyes:      General: No scleral icterus  Conjunctiva/sclera: Conjunctivae normal       Pupils: Pupils are equal, round, and reactive to light  Cardiovascular:      Rate and Rhythm: Normal rate and regular rhythm  Heart sounds: No murmur heard  No friction rub  No gallop  Pulmonary:      Effort: Pulmonary effort is normal  No respiratory distress  Breath sounds: Normal breath sounds  No stridor  No wheezing, rhonchi or rales  Abdominal:      General: Abdomen is flat  Palpations: Abdomen is soft  Musculoskeletal:         General: Normal range of motion  Cervical back: Normal range of motion and neck supple  Right lower leg: No edema  Left lower leg: No edema  Lymphadenopathy:      Cervical: No cervical adenopathy  Skin:     General: Skin is warm  Coloration: Skin is not jaundiced or pale  Findings: No bruising, erythema or lesion  Neurological:      General: No focal deficit present  Mental Status: She is alert and oriented to person, place, and time  Mental status is at baseline  Cranial Nerves: No cranial nerve deficit  Motor: No weakness  Psychiatric:         Mood and Affect: Mood normal          Behavior: Behavior normal          Thought Content:  Thought content normal           Additional Data: Labs:  Results from last 7 days   Lab Units 04/01/23  0456 03/31/23  0455   WBC Thousand/uL 10 31* 9 01   HEMOGLOBIN g/dL 8 2* 7 3*   HEMATOCRIT % 26 4* 23 4*   PLATELETS Thousands/uL 204 166   NEUTROS PCT %  --  28*   LYMPHS PCT %  --  60*   MONOS PCT %  --  9   EOS PCT %  --  3     Results from last 7 days   Lab Units 04/01/23  0456   SODIUM mmol/L 138   POTASSIUM mmol/L 3 4*   CHLORIDE mmol/L 96   CO2 mmol/L 33*   BUN mg/dL 13   CREATININE mg/dL 0 74   ANION GAP mmol/L 9   CALCIUM mg/dL 8 9   GLUCOSE RANDOM mg/dL 86     Results from last 7 days   Lab Units 03/30/23  1256   INR  1 07     Results from last 7 days   Lab Units 03/30/23  1259   POC GLUCOSE mg/dl 96         Results from last 7 days   Lab Units 03/31/23  0455 03/30/23  1343   LACTIC ACID mmol/L  --  0 4*   PROCALCITONIN ng/ml <0 05 <0 05       Lines/Drains:  Invasive Devices     Peripheral Intravenous Line  Duration           Peripheral IV 03/30/23 Right Antecubital 2 days          Drain  Duration           External Urinary Catheter 1 day                      Imaging: Reviewed radiology reports from this admission including: chest xray    Recent Cultures (last 7 days):   Results from last 7 days   Lab Units 03/30/23  1343   BLOOD CULTURE  No Growth at 24 hrs  No Growth at 24 hrs         Last 24 Hours Medication List:   Current Facility-Administered Medications   Medication Dose Route Frequency Provider Last Rate   • acetaminophen  650 mg Oral Q6H PRN Jennifer Blue MD     • albuterol  2 puff Inhalation Q4H PRN Stanfield Dry, DO     • aspirin  81 mg Oral Daily Jennifer Blue MD     • benzonatate  100 mg Oral TID PRN Jennifer Blue MD     • budesonide-formoterol  2 puff Inhalation BID Jennifer Blue MD     • carvedilol  6 25 mg Oral BID With Meals Jennifer Blue MD     • enoxaparin  40 mg Subcutaneous Daily Jennifer Blue MD     • escitalopram  20 mg Oral Daily Caitlyn Zamudio MD     • ferrous sulfate  325 mg Oral Daily With Breakfast Caitlyn Zamudio MD     • furosemide  20 mg Oral Daily Caitlyn Zamudio MD     • guaiFENesin  600 mg Oral BID Caitlyn Zamudio MD     • hydrochlorothiazide  25 mg Oral Daily Caitlyn Zamudio MD     • HYDROcodone-acetaminophen  1 tablet Oral Q6H PRN Caitlyn Zamudio MD     • ipratropium  0 5 mg Nebulization TID Durene Fruit, DO     • levalbuterol  1 25 mg Nebulization Q8H PRN Caitlyn Zamudio MD     • levalbuterol  1 25 mg Nebulization TID Durene Fruit, DO     • lisinopril  10 mg Oral Daily Caitlyn Zamudio MD     • melatonin  3 mg Oral HS PRN Caitlyn Zamudio MD     • nitroglycerin  0 4 mg Sublingual Q5 Min PRN Caitlyn Zamudio MD     • ondansetron  4 mg Intravenous Q6H PRN Caitlyn Zamudio MD     • pravastatin  20 mg Oral Daily With ripplrr inc Felisha Gomez MD     • sodium chloride  3 mL Nebulization Q8H PRN Durene Fruit, DO     • vilazodone  40 mg Oral Daily With Breakfast Caitlyn Zamudio MD     • Zanubrutinib  160 mg Oral BID Caitlyn Zamudio MD          Today, Patient Was Seen By: Roxane Olivo PA-C    **Please Note: This note may have been constructed using a voice recognition system  **

## 2023-04-01 NOTE — PLAN OF CARE
Problem: PAIN - ADULT  Goal: Verbalizes/displays adequate comfort level or baseline comfort level  Description: Interventions:  - Encourage patient to monitor pain and request assistance  - Assess pain using appropriate pain scale  - Administer analgesics based on type and severity of pain and evaluate response  - Implement non-pharmacological measures as appropriate and evaluate response  - Consider cultural and social influences on pain and pain management  - Notify physician/advanced practitioner if interventions unsuccessful or patient reports new pain  4/1/2023 1518 by Raymond Morton RN  Outcome: Progressing  4/1/2023 1518 by Raymond Morton RN  Outcome: Progressing     Problem: MOBILITY - ADULT  Goal: Maintain or return to baseline ADL function  Description: INTERVENTIONS:  -  Assess patient's ability to carry out ADLs; assess patient's baseline for ADL function and identify physical deficits which impact ability to perform ADLs (bathing, care of mouth/teeth, toileting, grooming, dressing, etc )  - Assess/evaluate cause of self-care deficits   - Assess range of motion  - Assess patient's mobility; develop plan if impaired  - Assess patient's need for assistive devices and provide as appropriate  - Encourage maximum independence but intervene and supervise when necessary  - Involve family in performance of ADLs  - Assess for home care needs following discharge   - Consider OT consult to assist with ADL evaluation and planning for discharge  - Provide patient education as appropriate  Outcome: Adequate for Discharge

## 2023-04-01 NOTE — CASE MANAGEMENT
Case Management Discharge Planning Note    Patient name Daily Castaneda  Location [address] [id number] MRN [id number]  : 1945 Date 2023       Current Admission Date: 3/30/2023  Current Admission Diagnosis:Generalized weakness   Patient Active Problem List    Diagnosis Date Noted   • Generalized weakness 2023   • Staring episodes 2023   • Waldenstrom macroglobulinemia ([address] ) 2023   • Severe protein-calorie malnutrition ([address] ) 2021   • Positive blood culture 2021   • COPD (chronic obstructive pulmonary disease) ([address] ) 2021   • SIRS (systemic inflammatory response syndrome) ([address] ) 2021   • Anemia 2021   • Abnormal computed tomography angiography (CTA) 2021   • Fatigue 2021   • Major depressive disorder, recurrent episode, moderate ([address] ) 2019   • Flank pain 2019   • Pneumonia 2019   • Sepsis ([address] ) 2019   • CAD (coronary artery disease), native coronary artery 2018   • Chronic respiratory failure with hypoxia ([address] ) 2018   • Ambulatory dysfunction 2018   • Hypokalemia 2018   • Essential hypertension 2018   • Tobacco abuse 2018      LOS (days): 1  Geometric Mean LOS (GMLOS) (days): 6 20  Days to GMLOS:5 1     OBJECTIVE:  Risk of Unplanned Readmission Score: 25 06         Current admission status: Inpatient   Preferred Pharmacy:   [facility] [address] [address] [address] [place] [address] Drive  Phone: [phone] Fax: [phone], [address] [address] [place] [address]  Phone: [phone] Fax: [phone]    Primary Care Provider: Wes Martin MD    Primary Insurance: MEDICARE  Secondary Insurance[facility] [address] [place], [place] [address]:    Discharge planning discussed with[de-identified] Kaylynn Hash of Choice: Yes  Comments - Freedom of Choice: Son Nataly Parks feels that this admission was caused by pt not taking her pysch meds  He is hoping that since they have been restarted, she will continue to improve and will be able to be discharged home  He does admit that he only has 1 week left of his family leave and will have to be returning to work  CM contacted [address] [place] [address] and spoke to Edgar Weller about possible admission  Edgar Weller will speak to  and someone will be calling back with confirmation if they can offer a bed  Awaiting call back  CM contacted family/caregiver?: Yes  Were Treatment Team discharge recommendations reviewed with patient/caregiver?: Yes  Did patient/caregiver verbalize understanding of patient care needs?: Yes  Were patient/caregiver advised of the risks associated with not following Treatment Team discharge recommendations?: Yes    Contacts  Patient Contacts: Bi Peres  Relationship to Patient[de-identified] Family  Contact Method: In Person  Reason/Outcome: Discharge Planning              Other Referral/Resources/Interventions Provided:  Interventions: [address] [facility], Short Term Rehab  Referral Comments: Decision about pt discharging either home or to [address] [place] [address] is still undetermined  Son Bi Peres feels that this admission was caused by pt not taking her pysch meds  He is hoping that since they have been restarted, she will continue to improve and will be able to be discharged home  He does admit that he only has 1 week left of his family leave and will have to be returning to work  CM contacted [address] [place] [address] and spoke to Edgar Weller about possible admission  Edgar Weller will speak to  and someone will be calling back with confirmation if they can offer a bed    Awaiting call back-    Would you like to participate in our [address] service program?  : No - Declined    Treatment Team Recommendation: Short Term Rehab  Discharge Destination Plan[de-identified] Short Term Rehab, Home with [facility]

## 2023-04-01 NOTE — ASSESSMENT & PLAN NOTE
· On 4 LNC at baseline, stable at baseline O2 needs  · Does not have any wheezing on examination  · Pulmonary hygiene  · Noted to have suspected pneumonia on CT scan, given 1 dose of IV Ceftriaxone in the ED   No fevers/chills, no worsening cough/sputum production, no change in O2 baseline needs  · procalcitonin WNL, no leukocytosis  · Repeat chest x-ray pending official read  · Monitor off antibiotics

## 2023-04-01 NOTE — PLAN OF CARE
Problem: INFECTION - ADULT  Goal: Absence or prevention of progression during hospitalization  Description: INTERVENTIONS:  - Assess and monitor for signs and symptoms of infection  - Monitor lab/diagnostic results  - Monitor all insertion sites, i e  indwelling lines, tubes, and drains  - Monitor endotracheal if appropriate and nasal secretions for changes in amount and color  - [place] appropriate cooling/warming therapies per order  - Administer medications as ordered  - Instruct and encourage patient and family to use good hand hygiene technique  - Identify and instruct in appropriate isolation precautions for identified infection/condition  Outcome: Progressing

## 2023-04-01 NOTE — RESPIRATORY THERAPY NOTE
RT Protocol Note  Jose Manuel Zimmerman 69 y o  female MRN: [id number]  Unit/Bed#: -01 Encounter: [id number]    Assessment    Principal Problem:    Generalized weakness  Active Problems:    Essential hypertension    Tobacco abuse    Chronic respiratory failure with hypoxia ([facility])    Major depressive disorder, recurrent episode, moderate (HCC)    COPD (chronic obstructive pulmonary disease) ([facility])    Anemia    Staring episodes      Home Pulmonary Medications:     04/01/23 0711   Respiratory Protocol   Protocol Initiated? No   Protocol Selection Respiratory   Language Barrier? No   Medical & Social History Reviewed? Yes   Diagnostic Studies Reviewed? Yes   Physical Assessment Performed? Yes   Home Devices/Therapy Home O2   Respiratory Plan Home Bronchodilator Patient pathway   Respiratory Assessment   Assessment Type Pre-treatment   General Appearance Alert; Awake   Respiratory Pattern Normal   Chest Assessment Chest expansion symmetrical   Bilateral Breath Sounds Diminished   Resp Comments takes nebs at home, continue with home therapy   O2 Device nc   Additional Assessments   SpO2 93 %       Home Devices/Therapy: Home O2    Past Medical History:   Diagnosis Date    Acute congestive heart failure ([facility] ) 8/27/2021    Anxiety     Cardiac disease     Chest pain 8/27/2021    Chronic pain disorder     COPD (chronic obstructive pulmonary disease) ([facility])     Depression     Heart disease     Hyperlipidemia     Hypertension     MI (myocardial infarction) ([facility] )     MRSA (methicillin resistant Staphylococcus aureus)     Renal disorder     benign kidney tumor     Social History     Socioeconomic History    Marital status:      Spouse name: Not on file    Number of children: 3    Years of education: 13    Highest education level: High school graduate   Occupational History    Occupation: [employer]     Employer: [employer]     Comment: retired    Tobacco Use    Smoking status: Every Day     Packs/day: 1 00 " Years: 60 00     Pack years: 60 00     Types: Cigarettes    Smokeless tobacco: Never    Tobacco comments:     She has close to a 60 pack-year smoking history, most recently has cut down to 4-5 cigarettes daily   Vaping Use    Vaping Use: Never used   Substance and Sexual Activity    Alcohol use: Never    Drug use: No    Sexual activity: Not Currently   Other Topics Concern    Not on file   Social History Narrative    Not on file     Social Determinants of Health     Financial Resource Strain: Not on file   Food Insecurity: No Food Insecurity    Worried About Running Out of Food in the Last Year: Never true    Ran Out of Food in the Last Year: Never true   Transportation Needs: No Transportation Needs    Lack of Transportation (Medical): No    Lack of Transportation (Non-Medical): No   Physical Activity: Not on file   Stress: Not on file   Social Connections: Not on file   Intimate Partner Violence: Not on file   Housing Stability: Low Risk     Unable to Pay for Housing in the Last Year: No    Number of Places Lived in the Last Year: 2    Unstable Housing in the Last Year: No       Subjective    Subjective Data: continue as ordered per home regimen    Objective    Physical Exam:   Assessment Type: Pre-treatment  General Appearance: Alert, Awake  Respiratory Pattern: Normal  Chest Assessment: Chest expansion symmetrical  Bilateral Breath Sounds: Diminished  O2 Device: nc    Vitals:  Blood pressure 125/85, pulse (!) 107, temperature 98 8 °F (37 1 °C), resp  rate 17, height 5' 7\" (1 702 m), weight 57 4 kg (126 lb 8 7 oz), SpO2 94 %, not currently breastfeeding  Imaging and other studies: I have personally reviewed pertinent reports        O2 Device: nc     Plan    Respiratory Plan: Home Bronchodilator Patient pathway        Resp Comments: takes nebs at home, continue with home therapy   "

## 2023-04-02 LAB
ANION GAP SERPL CALCULATED.3IONS-SCNC: 8 MMOL/L (ref 4–13)
BUN SERPL-MCNC: 22 MG/DL (ref 5–25)
CALCIUM SERPL-MCNC: 8.5 MG/DL (ref 8.4–10.2)
CHLORIDE SERPL-SCNC: 96 MMOL/L (ref 96–108)
CO2 SERPL-SCNC: 34 MMOL/L (ref 21–32)
CREAT SERPL-MCNC: 0.98 MG/DL (ref 0.6–1.3)
ERYTHROCYTE [DISTWIDTH] IN BLOOD BY AUTOMATED COUNT: 14.6 % (ref 11.6–15.1)
GFR SERPL CREATININE-BSD FRML MDRD: 56 ML/MIN/1.73SQ M
GLUCOSE SERPL-MCNC: 92 MG/DL (ref 65–140)
HCT VFR BLD AUTO: 28.2 % (ref 34.8–46.1)
HGB BLD-MCNC: 8.7 G/DL (ref 11.5–15.4)
MCH RBC QN AUTO: 29.2 PG (ref 26.8–34.3)
MCHC RBC AUTO-ENTMCNC: 30.9 G/DL (ref 31.4–37.4)
MCV RBC AUTO: 95 FL (ref 82–98)
PLATELET # BLD AUTO: 225 THOUSANDS/UL (ref 149–390)
PMV BLD AUTO: 9.8 FL (ref 8.9–12.7)
POTASSIUM SERPL-SCNC: 3.5 MMOL/L (ref 3.5–5.3)
RBC # BLD AUTO: 2.98 MILLION/UL (ref 3.81–5.12)
SODIUM SERPL-SCNC: 138 MMOL/L (ref 135–147)
WBC # BLD AUTO: 10.07 THOUSAND/UL (ref 4.31–10.16)

## 2023-04-02 RX ADMIN — IPRATROPIUM BROMIDE 0.5 MG: 0.5 SOLUTION RESPIRATORY (INHALATION) at 19:06

## 2023-04-02 RX ADMIN — IPRATROPIUM BROMIDE 0.5 MG: 0.5 SOLUTION RESPIRATORY (INHALATION) at 07:13

## 2023-04-02 RX ADMIN — ZANUBRUTINIB 160 MG: 80 CAPSULE, GELATIN COATED ORAL at 08:06

## 2023-04-02 RX ADMIN — VILAZODONE HYDROCHLORIDE 40 MG: 20 TABLET ORAL at 08:05

## 2023-04-02 RX ADMIN — LEVALBUTEROL HYDROCHLORIDE 1.25 MG: 1.25 SOLUTION, CONCENTRATE RESPIRATORY (INHALATION) at 07:13

## 2023-04-02 RX ADMIN — GUAIFENESIN 600 MG: 600 TABLET, EXTENDED RELEASE ORAL at 17:42

## 2023-04-02 RX ADMIN — FERROUS SULFATE TAB 325 MG (65 MG ELEMENTAL FE) 325 MG: 325 (65 FE) TAB at 08:07

## 2023-04-02 RX ADMIN — FUROSEMIDE 20 MG: 20 TABLET ORAL at 08:08

## 2023-04-02 RX ADMIN — LEVALBUTEROL HYDROCHLORIDE 1.25 MG: 1.25 SOLUTION, CONCENTRATE RESPIRATORY (INHALATION) at 19:06

## 2023-04-02 RX ADMIN — GUAIFENESIN 600 MG: 600 TABLET, EXTENDED RELEASE ORAL at 08:07

## 2023-04-02 RX ADMIN — CARVEDILOL 6.25 MG: 6.25 TABLET, FILM COATED ORAL at 08:08

## 2023-04-02 RX ADMIN — ESCITALOPRAM OXALATE 20 MG: 10 TABLET ORAL at 08:07

## 2023-04-02 RX ADMIN — ASPIRIN 81 MG: 81 TABLET ORAL at 08:10

## 2023-04-02 RX ADMIN — IPRATROPIUM BROMIDE 0.5 MG: 0.5 SOLUTION RESPIRATORY (INHALATION) at 13:38

## 2023-04-02 RX ADMIN — PRAVASTATIN SODIUM 20 MG: 20 TABLET ORAL at 17:42

## 2023-04-02 RX ADMIN — ENOXAPARIN SODIUM 40 MG: 40 INJECTION SUBCUTANEOUS at 08:10

## 2023-04-02 RX ADMIN — HYDROCODONE BITARTRATE AND ACETAMINOPHEN 1 TABLET: 5; 325 TABLET ORAL at 14:19

## 2023-04-02 RX ADMIN — CARVEDILOL 6.25 MG: 6.25 TABLET, FILM COATED ORAL at 17:42

## 2023-04-02 RX ADMIN — BUDESONIDE AND FORMOTEROL FUMARATE DIHYDRATE 2 PUFF: 160; 4.5 AEROSOL RESPIRATORY (INHALATION) at 17:42

## 2023-04-02 RX ADMIN — LEVALBUTEROL HYDROCHLORIDE 1.25 MG: 1.25 SOLUTION, CONCENTRATE RESPIRATORY (INHALATION) at 13:38

## 2023-04-02 RX ADMIN — ZANUBRUTINIB 160 MG: 80 CAPSULE, GELATIN COATED ORAL at 21:53

## 2023-04-02 RX ADMIN — BUDESONIDE AND FORMOTEROL FUMARATE DIHYDRATE 2 PUFF: 160; 4.5 AEROSOL RESPIRATORY (INHALATION) at 08:04

## 2023-04-02 RX ADMIN — HYDROCODONE BITARTRATE AND ACETAMINOPHEN 1 TABLET: 5; 325 TABLET ORAL at 08:09

## 2023-04-02 RX ADMIN — HYDROCHLOROTHIAZIDE 25 MG: 25 TABLET ORAL at 08:09

## 2023-04-02 RX ADMIN — LISINOPRIL 10 MG: 10 TABLET ORAL at 08:09

## 2023-04-02 NOTE — PLAN OF CARE
Problem: INFECTION - ADULT  Goal: Absence or prevention of progression during hospitalization  Description: INTERVENTIONS:  - Assess and monitor for signs and symptoms of infection  - Monitor lab/diagnostic results  - Monitor all insertion sites, i e  indwelling lines, tubes, and drains  - Monitor endotracheal if appropriate and nasal secretions for changes in amount and color  - [place] appropriate cooling/warming therapies per order  - Administer medications as ordered  - Instruct and encourage patient and family to use good hand hygiene technique  - Identify and instruct in appropriate isolation precautions for identified infection/condition  Outcome: Progressing

## 2023-04-02 NOTE — RESPIRATORY THERAPY NOTE
RT Protocol Note  Simona Rae 69 y o  female MRN: [id number]  Unit/Bed#: -01 Encounter: [id number]    Assessment    Principal Problem:    Generalized weakness  Active Problems:    Essential hypertension    Tobacco abuse    Chronic respiratory failure with hypoxia (HCC)    Major depressive disorder, recurrent episode, moderate (HCC)    COPD (chronic obstructive pulmonary disease) ([facility])    Anemia    Staring episodes      Home Pulmonary Medications:     04/02/23 0713   Respiratory Protocol   Protocol Initiated? No   Protocol Selection Respiratory   Language Barrier? No   Medical & Social History Reviewed? Yes   Diagnostic Studies Reviewed? Yes   Physical Assessment Performed? Yes   Home Devices/Therapy Home O2   Respiratory Plan Home Bronchodilator Patient pathway   Respiratory Assessment   Assessment Type Pre-treatment   General Appearance Alert; Awake   Respiratory Pattern Normal   Chest Assessment Chest expansion symmetrical   Resp Comments continue home regimen   O2 Device nc   Additional Assessments   SpO2 92 %       Home Devices/Therapy: Home O2    Past Medical History:   Diagnosis Date    Acute congestive heart failure ([facility] ) 8/27/2021    Anxiety     Cardiac disease     Chest pain 8/27/2021    Chronic pain disorder     COPD (chronic obstructive pulmonary disease) ([facility])     Depression     Heart disease     Hyperlipidemia     Hypertension     MI (myocardial infarction) ([facility] )     MRSA (methicillin resistant Staphylococcus aureus)     Renal disorder     benign kidney tumor     Social History     Socioeconomic History    Marital status:      Spouse name: Not on file    Number of children: 3    Years of education: 13    Highest education level: High school graduate   Occupational History    Occupation:      Employer: [employer]     Comment: retired    Tobacco Use    Smoking status: Every Day     Packs/day: 1 00     Years: 60 00     Pack years: 60 00     Types: Cigarettes "Smokeless tobacco: Never    Tobacco comments:     She has close to a 60 pack-year smoking history, most recently has cut down to 4-5 cigarettes daily   Vaping Use    Vaping Use: Never used   Substance and Sexual Activity    Alcohol use: Never    Drug use: No    Sexual activity: Not Currently   Other Topics Concern    Not on file   Social History Narrative    Not on file     Social Determinants of Health     Financial Resource Strain: Not on file   Food Insecurity: No Food Insecurity    Worried About Running Out of Food in the Last Year: Never true    Ran Out of Food in the Last Year: Never true   Transportation Needs: No Transportation Needs    Lack of Transportation (Medical): No    Lack of Transportation (Non-Medical): No   Physical Activity: Not on file   Stress: Not on file   Social Connections: Not on file   Intimate Partner Violence: Not on file   Housing Stability: Low Risk     Unable to Pay for Housing in the Last Year: No    Number of Places Lived in the Last Year: 2    Unstable Housing in the Last Year: No       Subjective    Subjective Data: continue as ordered    Objective    Physical Exam:   Assessment Type: Pre-treatment  General Appearance: Alert, Awake  Respiratory Pattern: Normal  Chest Assessment: Chest expansion symmetrical  O2 Device: nc    Vitals:  Blood pressure (!) 173/91, pulse 105, temperature 97 7 °F (36 5 °C), resp  rate 17, height 5' 7\" (1 702 m), weight 57 4 kg (126 lb 8 7 oz), SpO2 93 %, not currently breastfeeding  Imaging and other studies: I have personally reviewed pertinent reports        O2 Device: nc     Plan    Respiratory Plan: Home Bronchodilator Patient pathway        Resp Comments: continue home regimen   "

## 2023-04-02 NOTE — PROGRESS NOTES
Patient alert and oriented ate breakfast and lunch well  Appetite improving  Pain medication given 2x throughout the shift  Patient had 1 medium soft brown bowel movement during day shift  Patient worked with physical therapy ambulated and did side of bed exercises  Patient also ambulated with student nurse  Patient had all hygiene activities performed  Therapeutic communication given and patient was receptive and is now resting comfortably in bed  3 side rails up bed locked and in the lowest position  Bed alarm activated and will continue to monitor

## 2023-04-02 NOTE — PLAN OF CARE
Problem: PHYSICAL THERAPY ADULT  Goal: Performs mobility at highest level of function for planned discharge setting  See evaluation for individualized goals  Description: Treatment/Interventions: Functional transfer training, LE strengthening/ROM, Elevations, Therapeutic exercise, Endurance training, Patient/family training, Bed mobility, Gait training, OT          See flowsheet documentation for full assessment, interventions and recommendations  Outcome: Progressing  Note: Prognosis: Good  Problem List: Decreased strength, Decreased range of motion, Decreased endurance, Impaired balance, Decreased mobility  Assessment: Pt tolerated ambulation and transfer treatment well, needing Min A x 2 while on 3 L 02 via NC  Pt was able to complete exercises as detailed below  Pt was limited in ambulation distance due to pain but son thinks that this will get better as patient continues more on her psych meds  Cont PT per POC  Barriers to Discharge: Inaccessible home environment     PT Discharge Recommendation: Post acute rehabilitation services    See flowsheet documentation for full assessment

## 2023-04-02 NOTE — PHYSICAL THERAPY NOTE
04/02/23 1410   PT Last Visit   PT Visit Date 04/02/23   Pain Assessment   Pain Assessment Tool 0-10   Pain Score 8   Pain Location/Orientation Location: Knee;Orientation: Right   Hospital Pain Intervention(s) Medication (See MAR)   Restrictions/Precautions   Weight Bearing Precautions Per Order No   Other Precautions Bed Alarm;O2;Fall Risk  (3 L O2 via NC)   General   Chart Reviewed Yes   Family/Caregiver Present No   Cognition   Overall Cognitive Status WFL   Arousal/Participation Alert; Responsive; Cooperative   Attention Within functional limits   Orientation Level Oriented X4   Memory Within functional limits   Following Commands Follows all commands and directions without difficulty   Comments Pt agreeable to PT  Transfers   Sit to Stand 4  Minimal assistance   Additional items Increased time required;Verbal cues;Armrests;Assist x 2  (One person to walk pt, one person to manage O2 tank)   Stand to Sit 4  Minimal assistance   Additional items Assist x 2;Bedrails;Verbal cues  (One person to walk pt, one person to manage O2 tank)   Toilet transfer 4  Minimal assistance   Additional items Bedrails;Raised toilet seat;Assist x 1   Ambulation/Elevation   Gait pattern Decreased foot clearance; Excessively slow;Decreased hip extension;Decreased heel strike;Decreased toe off;Step through pattern   Gait Assistance 4  Minimal assist   Additional items Assist x 1;Verbal cues   Assistive Device Rolling walker   Distance 10 ft   Ambulation/Elevation Additional Comments Pt was previously walked with RN but pt's son asked for patient to be treated again  Pt's son thinks that her difficulty with being agreeable to walking is due to not taking her psych meds     Balance   Static Sitting Good   Dynamic Sitting Fair +   Static Standing Fair -   Dynamic Standing Poor +   Ambulatory Poor +   Endurance Deficit   Endurance Deficit Yes   Endurance Deficit Description decreased activity tolerance   Activity Tolerance   Activity Tolerance Patient limited by fatigue;Patient limited by pain   Exercises   Hip Flexion Sitting;20 reps;Bilateral;AROM   Knee AROM Long Arc Quad Sitting;20 reps;AROM; Bilateral   Ankle Pumps Sitting;20 reps;AROM; Bilateral   Balance Training Sitting  (Pt completed reaching exercises while sitting at EOB )   Assessment   Prognosis Good   Problem List Decreased strength;Decreased range of motion;Decreased endurance; Impaired balance;Decreased mobility   Assessment Pt tolerated ambulation and transfer treatment well, needing Min A x 2 while on 3 L 02 via NC  Pt was able to complete exercises as detailed below  Pt was limited in ambulation distance due to pain but son thinks that this will get better as patient continues more on her psych meds  Cont PT per POC     Barriers to Discharge Inaccessible home environment 49 yo female is scheduled for "Laparoscopic sleeve gastrectomy w/upper endoscopy req PA" On 2/26/18  Pre op instructions were reviewed and signed  Patient will obtain medical clearance

## 2023-04-02 NOTE — PROGRESS NOTES
[facility]  Progress Note  Name: Palak Uribe  MRN: [id number]  Unit/Bed#: -01 I Date of Admission: 3/30/2023   Date of Service: 4/2/2023 I Hospital Day: 2    Assessment/Plan   * Generalized weakness  Assessment & Plan  · Reports generalized weakness, feeling down and concerned regarding multiple hospitalizations recently  Was just in the hospital around 2 weeks ago for COPD exacerbation, reports went to rehab and eventually went home to live with son  · Suspect primarily deconditioning due to multiple hospitalizations, progression of underlying chronic illnesses, with possible component of noncompliance with medications, especially her antidepressants  · PT/OT evaluation-postacute rehab    Anemia  Assessment & Plan  · Appears to be at baseline, no reports of bleeding  · Continue iron supplementation    COPD (chronic obstructive pulmonary disease) ([facility] [address] )  Assessment & Plan  · Recently hospitalized for exacerbation, currently does not have any shortness of breath, no wheezing  Stable on chronic 4 LNC at baseline  · Recently finished a course of prednisone  · Does not appear to be in exacerbation at this time  · Continue home inhalers, nebs    Chronic respiratory failure with hypoxia (HCC)  Assessment & Plan  · On 4 LNC at baseline, stable at baseline O2 needs  · Does not have any wheezing on examination  · Pulmonary hygiene  · Noted to have suspected pneumonia on CT scan, given 1 dose of IV Ceftriaxone in the ED   No fevers/chills, no worsening cough/sputum production, no change in O2 baseline needs  · procalcitonin WNL, no leukocytosis  · Repeat chest x-ray pending official read  · Monitor off antibiotics    Tobacco abuse  Assessment & Plan  · Discussed regarding cessation for 5 minutes  · Offered nicotine patch    Essential hypertension  Assessment & Plan  · Continue home Coreg, enalapril, Lasix, HCTZ  · Monitor BP            VTE Pharmacologic Prophylaxis: VTE Score: 3 Moderate Risk (Score 3-4) - Pharmacological DVT Prophylaxis Ordered: enoxaparin (Lovenox)  Patient Centered Rounds: I performed bedside rounds with nursing staff today  Discussions with Specialists or Other Care Team Provider: Jelani    Education and Discussions with Family / Patient: Updated  (son) at bedside  Total Time Spent on Date of Encounter in care of patient: 45 minutes This time was spent on one or more of the following: performing physical exam; counseling and coordination of care; obtaining or reviewing history; documenting in the medical record; reviewing/ordering tests, medications or procedures; communicating with other healthcare professionals and discussing with patient's family/caregivers  Current Length of Stay: 2 day(s)  Current Patient Status: Inpatient   Certification Statement: The patient will continue to require additional inpatient hospital stay due to Awaiting placement  Discharge Plan: Anticipate discharge tomorrow to rehab facility  Code Status: Level 3 - DNAR and DNI    Subjective:   Patient reports still feeling relatively same  She has a lack of motivation and generalized fatigue  She denies chest pain/pressure, palpitations, lightheadedness, nausea, shortness of breath, or chills  Objective:     Vitals:   Temp (24hrs), Av 1 °F (36 7 °C), Min:97 7 °F (36 5 °C), Max:98 7 °F (37 1 °C)    Temp:  [97 7 °F (36 5 °C)-98 7 °F (37 1 °C)] 98 °F (36 7 °C)  HR:  [] 88  Resp:  [17] 17  BP: (111-173)/(65-93) 111/65  SpO2:  [90 %-96 %] 95 %  Body mass index is 19 82 kg/m²  Input and Output Summary (last 24 hours): Intake/Output Summary (Last 24 hours) at 2023 1547  Last data filed at 2023 0801  Gross per 24 hour   Intake 720 ml   Output 50 ml   Net 670 ml       Physical Exam:   Physical Exam  Vitals and nursing note reviewed  Constitutional:       Appearance: She is normal weight  HENT:      Head: Normocephalic        Nose: Nose normal       Mouth/Throat: Mouth: Mucous membranes are moist       Pharynx: Oropharynx is clear  Eyes:      General: No scleral icterus  Conjunctiva/sclera: Conjunctivae normal       Pupils: Pupils are equal, round, and reactive to light  Cardiovascular:      Rate and Rhythm: Normal rate and regular rhythm  Heart sounds: No murmur heard  No friction rub  No gallop  Pulmonary:      Effort: Pulmonary effort is normal  No respiratory distress  Breath sounds: Normal breath sounds  No stridor  No wheezing, rhonchi or rales  Abdominal:      General: Abdomen is flat  There is no distension  Palpations: Abdomen is soft  Tenderness: There is no abdominal tenderness  There is no guarding or rebound  Hernia: No hernia is present  Musculoskeletal:         General: Normal range of motion  Cervical back: Normal range of motion and neck supple  Right lower leg: No edema  Left lower leg: No edema  Lymphadenopathy:      Cervical: No cervical adenopathy  Skin:     General: Skin is warm  Coloration: Skin is not jaundiced or pale  Findings: No bruising, erythema or lesion  Neurological:      General: No focal deficit present  Mental Status: She is alert and oriented to person, place, and time  Mental status is at baseline  Cranial Nerves: No cranial nerve deficit  Motor: No weakness  Psychiatric:         Mood and Affect: Mood normal          Behavior: Behavior normal          Thought Content: Thought content normal           Additional Data:     Labs:  Results from last 7 days   Lab Units 04/02/23  0453 04/01/23  0456 03/31/23  0455   WBC Thousand/uL 10 07   < > 9 01   HEMOGLOBIN g/dL 8 7*   < > 7 3*   HEMATOCRIT % 28 2*   < > 23 4*   PLATELETS Thousands/uL 225   < > 166   NEUTROS PCT %  --   --  28*   LYMPHS PCT %  --   --  60*   MONOS PCT %  --   --  9   EOS PCT %  --   --  3    < > = values in this interval not displayed       Results from last 7 days   Lab Units 04/02/23  0453   SODIUM mmol/L 138   POTASSIUM mmol/L 3 5   CHLORIDE mmol/L 96   CO2 mmol/L 34*   BUN mg/dL 22   CREATININE mg/dL 0 98   ANION GAP mmol/L 8   CALCIUM mg/dL 8 5   GLUCOSE RANDOM mg/dL 92     Results from last 7 days   Lab Units 03/30/23  1256   INR  1 07     Results from last 7 days   Lab Units 03/30/23  1259   POC GLUCOSE mg/dl 96         Results from last 7 days   Lab Units 03/31/23  0455 03/30/23  1343   LACTIC ACID mmol/L  --  0 4*   PROCALCITONIN ng/ml <0 05 <0 05       Lines/Drains:  Invasive Devices     Peripheral Intravenous Line  Duration           Peripheral IV 03/30/23 Right Antecubital 3 days          Drain  Duration           External Urinary Catheter 2 days                      Imaging: Reviewed radiology reports from this admission including: chest xray    Recent Cultures (last 7 days):   Results from last 7 days   Lab Units 03/30/23  1343   BLOOD CULTURE  No Growth at 48 hrs  No Growth at 48 hrs         Last 24 Hours Medication List:   Current Facility-Administered Medications   Medication Dose Route Frequency Provider Last Rate   • acetaminophen  650 mg Oral Q6H PRN Edson Dancer, MD     • albuterol  2 puff Inhalation Q4H PRN Jason Suazo DO     • aspirin  81 mg Oral Daily Edson Dancer, MD     • benzonatate  100 mg Oral TID PRN Edson Dancer, MD     • budesonide-formoterol  2 puff Inhalation BID Edson Dancer, MD     • carvedilol  6 25 mg Oral BID With Meals Edson Dancer, MD     • enoxaparin  40 mg Subcutaneous Daily Edson Dancer, MD     • escitalopram  20 mg Oral Daily Edson Dancer, MD     • ferrous sulfate  325 mg Oral Daily With [address] Mick Romero MD     • furosemide  20 mg Oral Daily Zahira Romero MD     • guaiFENesin  600 mg Oral BID Anita Romero MD     • hydrochlorothiazide  25 mg Oral Daily Zahira Romero MD     • HYDROcodone-acetaminophen  1 tablet Oral Q6H PRN Bianka Crowley MD     • ipratropium  0 5 mg Nebulization TID Espiridion Carolyn, DO     • levalbuterol  1 25 mg Nebulization Q8H PRN Bianka Crowley MD     • levalbuterol  1 25 mg Nebulization TID Espiridion Carolyn, DO     • lisinopril  10 mg Oral Daily Bianka Crowley MD     • melatonin  3 mg Oral HS PRN Bianka Crowley MD     • nitroglycerin  0 4 mg Sublingual Q5 Min PRN Bianka Crowley MD     • ondansetron  4 mg Intravenous Q6H PRN Bianka Crowley MD     • pravastatin  20 mg Oral Daily With Lexi Castro MD     • sodium chloride  3 mL Nebulization Q8H PRN Espiridion Carolyn, DO     • vilazodone  40 mg Oral Daily With Breakfast Bianka Crowley MD     • Zanubrutinib  160 mg Oral BID Bianka Crowley MD          Today, Patient Was Seen By: Evangelista Olivo PA-C    **Please Note: This note may have been constructed using a voice recognition system  **

## 2023-04-03 VITALS
RESPIRATION RATE: 17 BRPM | BODY MASS INDEX: 19.86 KG/M2 | SYSTOLIC BLOOD PRESSURE: 129 MMHG | OXYGEN SATURATION: 94 % | HEIGHT: 67 IN | TEMPERATURE: 98.8 F | HEART RATE: 105 BPM | DIASTOLIC BLOOD PRESSURE: 75 MMHG | WEIGHT: 126.54 LBS

## 2023-04-03 RX ADMIN — GUAIFENESIN 600 MG: 600 TABLET, EXTENDED RELEASE ORAL at 08:01

## 2023-04-03 RX ADMIN — ENOXAPARIN SODIUM 40 MG: 40 INJECTION SUBCUTANEOUS at 08:00

## 2023-04-03 RX ADMIN — LISINOPRIL 10 MG: 10 TABLET ORAL at 08:01

## 2023-04-03 RX ADMIN — VILAZODONE HYDROCHLORIDE 40 MG: 20 TABLET ORAL at 07:58

## 2023-04-03 RX ADMIN — FUROSEMIDE 20 MG: 20 TABLET ORAL at 08:01

## 2023-04-03 RX ADMIN — FERROUS SULFATE TAB 325 MG (65 MG ELEMENTAL FE) 325 MG: 325 (65 FE) TAB at 07:58

## 2023-04-03 RX ADMIN — IPRATROPIUM BROMIDE 0.5 MG: 0.5 SOLUTION RESPIRATORY (INHALATION) at 07:09

## 2023-04-03 RX ADMIN — CARVEDILOL 6.25 MG: 6.25 TABLET, FILM COATED ORAL at 07:58

## 2023-04-03 RX ADMIN — ZANUBRUTINIB 160 MG: 80 CAPSULE, GELATIN COATED ORAL at 07:56

## 2023-04-03 RX ADMIN — BUDESONIDE AND FORMOTEROL FUMARATE DIHYDRATE 2 PUFF: 160; 4.5 AEROSOL RESPIRATORY (INHALATION) at 08:01

## 2023-04-03 RX ADMIN — LEVALBUTEROL HYDROCHLORIDE 1.25 MG: 1.25 SOLUTION, CONCENTRATE RESPIRATORY (INHALATION) at 13:23

## 2023-04-03 RX ADMIN — HYDROCODONE BITARTRATE AND ACETAMINOPHEN 1 TABLET: 5; 325 TABLET ORAL at 03:15

## 2023-04-03 RX ADMIN — ASPIRIN 81 MG: 81 TABLET ORAL at 08:00

## 2023-04-03 RX ADMIN — HYDROCHLOROTHIAZIDE 25 MG: 25 TABLET ORAL at 08:01

## 2023-04-03 RX ADMIN — ESCITALOPRAM OXALATE 20 MG: 10 TABLET ORAL at 08:00

## 2023-04-03 RX ADMIN — LEVALBUTEROL HYDROCHLORIDE 1.25 MG: 1.25 SOLUTION, CONCENTRATE RESPIRATORY (INHALATION) at 07:09

## 2023-04-03 RX ADMIN — IPRATROPIUM BROMIDE 0.5 MG: 0.5 SOLUTION RESPIRATORY (INHALATION) at 13:23

## 2023-04-03 NOTE — DISCHARGE INSTR - AVS FIRST PAGE
Please obtain the repeat chest x-ray that was ordered in 6 months to help determine if pneumonia is fully completed and please follow-up with results with your PCP

## 2023-04-03 NOTE — CASE MANAGEMENT
Case Management Discharge Planning Note    Patient name Eyad Drivers  Location [facility] 305/-01 MRN [id number]  : 1945 Date 4/3/2023       Current Admission Date: 3/30/2023  Current Admission Diagnosis:Generalized weakness   Patient Active Problem List    Diagnosis Date Noted   • Generalized weakness 2023   • Staring episodes 2023   • Waldenstrom macroglobulinemia ([address] ) 2023   • Severe protein-calorie malnutrition ([address] ) 2021   • Positive blood culture 2021   • COPD (chronic obstructive pulmonary disease) ([address] ) 2021   • SIRS (systemic inflammatory response syndrome) ([address] ) 2021   • Anemia 2021   • Abnormal computed tomography angiography (CTA) 2021   • Fatigue 2021   • Major depressive disorder, recurrent episode, moderate ([address] ) 2019   • Flank pain 2019   • Pneumonia 2019   • Sepsis ([address] ) 2019   • CAD (coronary artery disease), native coronary artery 2018   • Chronic respiratory failure with hypoxia ([address] ) 2018   • Ambulatory dysfunction 2018   • Hypokalemia 2018   • Essential hypertension 2018   • Tobacco abuse 2018      LOS (days): 3  Geometric Mean LOS (GMLOS) (days): 6 20  Days to GMLOS:3 1     OBJECTIVE:  Risk of Unplanned Readmission Score: 25 72         Current admission status: Inpatient   Preferred Pharmacy:   [facility] [address] [address] [address] [place] [address],[facility] [address] [address]  Phone: [phone] Fax: [phone], [address] [address] [place] [address]  Phone: [phone] Fax: [phone]    Primary Care Provider: Tracy Herrera MD    Primary Insurance: MEDICARE  Secondary Insurance: [id number]    DISCHARGE DETAILS:                                          Other Referral/Resources/Interventions Provided:  Referral Comments: PASSR completed and sent via [address], also sent p/u time via [address]           Treatment Team Recommendation: Short Term Rehab, SNF  Discharge Destination Plan[de-identified] Short Term Rehab, SNF  Transport at Discharge : BLS Ambulance                             IMM Given (Date):: 04/03/23  IMM Given to[de-identified] Patient

## 2023-04-03 NOTE — DISCHARGE SUMMARY
[facility]  Discharge- Raul Hopping 1945, 69 y o  female MRN: [id number]  Unit/Bed#: -E[facility] Encounter: [id number]  Primary Care Provider: Suresh Cullen MD   Date and time admitted to hospital: 3/30/2023 12:13 PM    * Generalized weakness  Assessment & Plan  · Reports generalized weakness, feeling down and concerned regarding multiple hospitalizations recently  Was just in the hospital around 2 weeks ago for COPD exacerbation, reports went to rehab and eventually went home to live with son  · Suspect primarily deconditioning due to multiple hospitalizations, progression of underlying chronic illnesses, with possible component of noncompliance with medications, especially her antidepressants  · PT/OT evaluation-postacute rehab    Anemia  Assessment & Plan  · Appears to be at baseline, no reports of bleeding  · Continue iron supplementation    COPD (chronic obstructive pulmonary disease) ([facility] [address] )  Assessment & Plan  · Recently hospitalized for exacerbation, currently does not have any shortness of breath, no wheezing  Stable on chronic 4 LNC at baseline  · Recently finished a course of prednisone  · Does not appear to be in exacerbation at this time  · Continue home inhalers, nebs    Major depressive disorder, recurrent episode, moderate (HCC)  Assessment & Plan  · Suspect contributing to current symptoms  · Patient endorses that she is not compliant with prescribed medications  · Offered psychiatry evaluation, however patient is refusing at this time  · Continue home Lexapro, Viibryd    Chronic respiratory failure with hypoxia (HCC)  Assessment & Plan  · On 4 LNC at baseline, stable at baseline O2 needs  · Does not have any wheezing on examination  · Pulmonary hygiene  · Noted to have suspected pneumonia on CT scan, given 1 dose of IV Ceftriaxone in the ED   No fevers/chills, no worsening cough/sputum production, no change in O2 baseline needs  · procalcitonin WNL, no leukocytosis  · Monitor off antibiotics    Tobacco abuse  Assessment & Plan  · Discussed regarding cessation for 5 minutes  · Offered nicotine patch    Essential hypertension  Assessment & Plan  · Continue home Coreg, enalapril, Lasix, HCTZ  · Monitor BP      Medical Problems     Resolved Problems  Date Reviewed: 3/10/2023   None       Discharging Physician / Practitioner: Roxane Olivo PA-C  PCP: Dylan Sarah MD  Admission Date:   Admission Orders (From admission, onward)     Ordered        03/31/23 1202  Inpatient Admission  Once            03/30/23 1356  Place in Observation  Once                      Discharge Date: 04/03/23    Consultations During Hospital Stay:  · Neurology    Procedures Performed:   XR chest portable  Impression: Stable multifocal patchy bilateral airspace opacities, compatible with pneumonia in the appropriate clinical context  XR chest portable  Impression: No acute cardiopulmonary disease  XR chest 1 view portable  Impression: No acute cardiopulmonary disease  XR chest 2 views  Impression: Left greater than right upper lobe pneumonia  CT stroke alert brain  Impression: No acute intracranial abnormality  Mild chronic microangiopathy  CTA stroke alert (head/neck)  Impression: Severe stenosis origin right vertebral artery  Moderate stenosis proximal left ICA  No high-grade intracranial stenosis, large vessel occlusion or aneurysm  Left upper lobe consolidation suspicious for pneumonia        Significant Findings / Test Results:   · Procalcitonin remains less than 0 05  · Blood culture showed no growth at 42 hours    Incidental Findings:   · None      Test Results Pending at Discharge (will require follow up):    · None     Outpatient Tests Requested:  · Chest x-ray in 6 months to determine resolution of pneumonia    Complications: None    Reason for Admission: Generalized weakness    Hospital Course:   Adrienne Linares is a 69 y o  female patient who originally "presented to the hospital on 3/30/2023 due to experiencing generalized weakness and staring episodes at home  Son was concerned for strokelike symptoms and when she presented to the ED, she was started on a stroke alert  A CTA and CT of the head was obtained and neurology was consulted  After imaging and discussed few discussions neurology signed off and determined that patient likely did not have a stroke  It was reported that the patient is not taking her psych medications for a few weeks  This was likely etiology of cause of newfound weakness  The patient was recently seen to have pneumonia as well and an infectious work-up was conducted and was negative for any acute infection  PT/OT saw the patient and recommended short-term rehab  She will be sent to [facility] to continue rehab as well as continue psych medication regimen  It is recommended that the patient would have a follow-up for a chest x-ray in 6 months to determine resolution of previous pneumonia  At this time the patient is medically stable for discharge and agreeable for plan of discharge  For further information in regards to course of hospitalization, please see notes attached  Please see above list of diagnoses and related plan for additional information  Condition at Discharge: good    Discharge Day Visit / Exam:   Subjective: Patient reports feeling more improved today  She denies chest pressure/pain, palpitations, lightheadedness, nausea, or shortness of breath  Vitals: Blood Pressure: 129/75 (04/03/23 0757)  Pulse: 105 (04/03/23 0757)  Temperature: 98 8 °F (37 1 °C) (04/02/23 2301)  Temp Source: Oral (03/30/23 1220)  Respirations: 17 (04/01/23 1633)  Height: 5' 7\" (170 2 cm) (03/30/23 1741)  Weight - Scale: 57 4 kg (126 lb 8 7 oz) (03/30/23 1741)  SpO2: 94 % (04/03/23 0757)  Exam:   Physical Exam  Vitals and nursing note reviewed  Constitutional:       Appearance: She is normal weight     HENT:      Head: " Normocephalic  Nose: Nose normal       Mouth/Throat:      Mouth: Mucous membranes are moist       Pharynx: Oropharynx is clear  Eyes:      General: No scleral icterus  Conjunctiva/sclera: Conjunctivae normal       Pupils: Pupils are equal, round, and reactive to light  Cardiovascular:      Rate and Rhythm: Normal rate and regular rhythm  Heart sounds: No murmur heard  No friction rub  No gallop  Pulmonary:      Effort: Pulmonary effort is normal  No respiratory distress  Breath sounds: Normal breath sounds  No stridor  No wheezing, rhonchi or rales  Abdominal:      General: Abdomen is flat  Palpations: Abdomen is soft  Musculoskeletal:         General: Normal range of motion  Cervical back: Normal range of motion and neck supple  Right lower leg: No edema  Left lower leg: No edema  Lymphadenopathy:      Cervical: No cervical adenopathy  Skin:     General: Skin is warm  Coloration: Skin is not jaundiced or pale  Findings: No bruising, erythema or lesion  Neurological:      General: No focal deficit present  Mental Status: She is alert and oriented to person, place, and time  Mental status is at baseline  Cranial Nerves: No cranial nerve deficit  Motor: No weakness  Psychiatric:         Mood and Affect: Mood normal          Behavior: Behavior normal          Thought Content: Thought content normal            Discussion with Family: Updated  (daughter) via phone  Discharge instructions/Information to patient and family:   See after visit summary for information provided to patient and family  Provisions for Follow-Up Care:  See after visit summary for information related to follow-up care and any pertinent home health orders  Disposition:   Assisted Living Facility at [facility] rest    Planned Readmission: no     Discharge Statement:  I spent 60 minutes discharging the patient   This time was spent on the day of discharge  I had direct contact with the patient on the day of discharge  Greater than 50% of the total time was spent examining patient, answering all patient questions, arranging and discussing plan of care with patient as well as directly providing post-discharge instructions  Additional time then spent on discharge activities  Discharge Medications:  See after visit summary for reconciled discharge medications provided to patient and/or family        **Please Note: This note may have been constructed using a voice recognition system**

## 2023-04-03 NOTE — CASE MANAGEMENT
Case Management Discharge Planning Note    Patient name Flory Medel  Location [address] 305/-01 MRN [id number]  : 1945 Date 4/3/2023       Current Admission Date: 3/30/2023  Current Admission Diagnosis:Generalized weakness   Patient Active Problem List    Diagnosis Date Noted   • Generalized weakness 2023   • Staring episodes 2023   • Waldenstrom macroglobulinemia ([address] ) 2023   • Severe protein-calorie malnutrition ([address] ) 2021   • Positive blood culture 2021   • COPD (chronic obstructive pulmonary disease) ([address] ) 2021   • SIRS (systemic inflammatory response syndrome) ([address] ) 2021   • Anemia 2021   • Abnormal computed tomography angiography (CTA) 2021   • Fatigue 2021   • Major depressive disorder, recurrent episode, moderate ([address] ) 2019   • Flank pain 2019   • Pneumonia 2019   • Sepsis ([address] ) 2019   • CAD (coronary artery disease), native coronary artery 2018   • Chronic respiratory failure with hypoxia ([address] ) 2018   • Ambulatory dysfunction 2018   • Hypokalemia 2018   • Essential hypertension 2018   • Tobacco abuse 2018      LOS (days): 3  Geometric Mean LOS (GMLOS) (days): 6 20  Days to GMLOS:3 1     OBJECTIVE:  Risk of Unplanned Readmission Score: 25 72         Current admission status: Inpatient   Preferred Pharmacy:   [facility] [address] [address] [address] [place] [address] Drive  Phone: [phone] Fax: [phone], [address] [address] [place] [address]  Phone: [phone] Fax: [phone]    Primary Care Provider: Delma Rose MD    Primary Insurance: MEDICARE  Secondary Insurance: [address] [place] [address] DETAILS:                                          Other Referral/Resources/Interventions Provided:  Referral Comments: T/C this AM from [place] at [facility];  willing to accept pt for STR  Discussed case w MIRIAN Norton, who reports pt is ready for d/c, and both son and pt want placement at [facility]  LMN completed and placed in pt's chart  Transport requested through Elaine Arndt and 1430 p/u time assigned  Nurse Jett Duncan and MIRIAN Bear made aware;  nurse Jett Duncan calling son per his request to make him aware of p/u time, and pt made aware at bedside  AVS and d/c summary sent to facility via [address]           Treatment Team Recommendation: Short Term Rehab, SNF  Discharge Destination Plan[de-identified] Short Term Rehab, SNF  Transport at Discharge : S Ambulance                             IMM Given (Date):: 04/03/23  IMM Given to[de-identified] Patient

## 2023-04-04 LAB
BACTERIA BLD CULT: NORMAL
BACTERIA BLD CULT: NORMAL

## 2023-04-21 NOTE — ED PROVIDER NOTES
History  Chief Complaint   Patient presents with    Shortness of Breath     Sob for a few days with fatigue  States "has not eaten in 3 days due to being tired"      76 yo female with lymphoma and COPD on 4LNC home oxygen who presents to ED with several concerns including generalized weakness, anorexia, fatigue, poor PO intake for the last 3 days, nonproductive cough and dyspnea  No fever  No chest pain  No abdominal pain  Severity of sxs is moderate  There are no aggravating or alleviating factors  Social history - smoker, lives alone  Prior to Admission Medications   Prescriptions Last Dose Informant Patient Reported? Taking? Calcium-Magnesium-Vitamin D (CALCIUM 500 PO)   Yes No   Sig: Take 1,000 mg by mouth daily   HYDROcodone-acetaminophen (NORCO) 5-325 mg per tablet   Yes No   Sig: Take 1 tablet by mouth every 6 (six) hours as needed for pain   albuterol (VENTOLIN HFA) 90 mcg/act inhaler   No No   Sig: Inhale 2 puffs every 6 (six) hours as needed for wheezing   alendronate (FOSAMAX) 35 mg tablet   Yes No   Sig: Take 35 mg by mouth every 7 days Patient takes this med every Sunday  Patient not taking: Reported on 8/27/2021   amLODIPine (NORVASC) 5 mg tablet   No No   Sig: Take 1 tablet (5 mg total) by mouth daily   Patient not taking: Reported on 3/30/2021   ascorbic acid (VITAMIN C) 500 mg tablet   Yes No   Sig: Take 500 mg by mouth daily   Patient not taking: Reported on 8/27/2021   aspirin (ECOTRIN LOW STRENGTH) 81 mg EC tablet   No No   Sig: Take 1 tablet (81 mg total) by mouth daily   budesonide-formoterol (SYMBICORT) 160-4 5 mcg/act inhaler   No No   Sig: Inhale 2 puffs 2 (two) times a day Rinse mouth after use     Patient not taking: Reported on 3/30/2021   carvedilol (COREG) 6 25 mg tablet   No No   Sig: Take 1 tablet (6 25 mg total) by mouth 2 (two) times a day with meals   cholecalciferol (VITAMIN D3) 1,000 units tablet   No No   Sig: Take 1 tablet (1,000 Units total) by mouth daily Alert and oriented, no focal deficits, no motor or sensory deficits. Patient not taking: Reported on 3/30/2021   enalapril (VASOTEC) 5 mg tablet   No No   Sig: Take 1 tablet (5 mg total) by mouth 2 (two) times a day   escitalopram (LEXAPRO) 10 mg tablet   No No   Sig: Take 1 tablet (10 mg total) by mouth daily   Patient taking differently: Take 20 mg by mouth daily    ferrous sulfate 325 (65 Fe) mg tablet   Yes No   Sig: Take 325 mg by mouth daily with breakfast   Patient not taking: Reported on 8/27/2021   furosemide (LASIX) 20 mg tablet   No No   Sig: Take 1 tablet (20 mg total) by mouth daily   guaiFENesin (MUCINEX) 600 mg 12 hr tablet   No No   Sig: Take 1 tablet (600 mg total) by mouth 2 (two) times a day   Patient not taking: Reported on 3/30/2021   hydrochlorothiazide (HYDRODIURIL) 25 mg tablet   No No   Sig: Take 1 tablet (25 mg total) by mouth daily   Patient not taking: Reported on 3/30/2021   levalbuterol (XOPENEX) 1 25 mg/0 5 mL nebulizer solution   No No   Sig: Take 0 5 mL (1 25 mg total) by nebulization 3 (three) times a day   Patient not taking: Reported on 8/27/2021   melatonin 3 mg   No No   Sig: Take 1 tablet (3 mg total) by mouth daily at bedtime as needed (insomnia)   Patient not taking: Reported on 3/30/2021   nitroglycerin (NITROSTAT) 0 4 mg SL tablet   Yes No   Sig: Place 0 4 mg under the tongue every 5 (five) minutes as needed for chest pain   prasugrel (EFFIENT) tablet   Yes No   Sig: Take 10 mg by mouth   Patient not taking: Reported on 8/27/2021   pravastatin (PRAVACHOL) 20 mg tablet   No No   Sig: Take 1 tablet (20 mg total) by mouth daily with dinner   predniSONE 20 mg tablet   No No   Sig: Take 1 tablet (20 mg total) by mouth daily   Patient not taking: Reported on 3/30/2021   sodium chloride 0 9 % nebulizer solution   No No   Sig: Take 3 mL by nebulization 3 (three) times a day   vilazodone (Viibryd) 40 mg tablet   Yes No   Sig: Take 40 mg by mouth daily with breakfast      Facility-Administered Medications: None       Past Medical History: Diagnosis Date    Acute [facility]ive heart failure ([facility] ) 8/27/2021    Anxiety     Cardiac disease     Chest pain 8/27/2021    Chronic pain disorder     [facility] obstructive pulmonary disease) ([facility])     Depression     Heart disease     Hyperlipidemia     Hypertension     MI (myocardial infarction) ([facility] )     MRSA (methicillin resistant Staphylococcus aureus)     Renal disorder     benign kidney tumor       Past Surgical History:   Procedure Laterality Date    APPENDECTOMY      ELBOW BURSA SURGERY Left 02/2018    D/T MRSA INFECTION    SPINAL FUSION      L7 L9       Family History   Problem Relation Age of Onset    Heart disease Mother     Cancer Father      I have reviewed and agree with the history as documented  E-Cigarette/Vaping     E-Cigarette/Vaping Substances     Social History     Tobacco Use    Smoking status: Current Every Day Smoker     Packs/day: 1 00     Years: 60 00     Pack years: 60 00     Types: Cigarettes    Smokeless tobacco: Never Used    Tobacco comment: She has close to a 60 pack-year smoking history, most recently has cut down to 4-5 cigarettes daily   Substance Use Topics    Alcohol use: Never    Drug use: No       Review of Systems   Constitutional: Positive for fatigue  Negative for fever  Respiratory: Positive for cough and shortness of breath  Negative for chest tightness  Neurological: Positive for weakness  Negative for dizziness, facial asymmetry, light-headedness and headaches  All other systems reviewed and are negative  Physical Exam  Physical Exam  Vitals and nursing note reviewed  Constitutional:       General: She is not in acute distress  Appearance: She is well-developed  She is ill-appearing (chronically ill appearing, no acute distress  awake, conversant, pleasant)  She is not diaphoretic  HENT:      Head: Normocephalic and atraumatic  Nose: Nose normal  No congestion        Mouth/Throat:      Mouth: Mucous membranes are dry       Pharynx: No oropharyngeal exudate  Eyes:      Conjunctiva/sclera: Conjunctivae normal       Pupils: Pupils are equal, round, and reactive to light  Neck:      Vascular: No JVD  Cardiovascular:      Rate and Rhythm: Normal rate and regular rhythm  Pulses: Normal pulses  Heart sounds: Normal heart sounds  Pulmonary:      Effort: Pulmonary effort is normal  No respiratory distress  Breath sounds: No stridor  Wheezing and rhonchi present  Abdominal:      General: There is no distension  Palpations: Abdomen is soft  Tenderness: There is no abdominal tenderness  There is no guarding or rebound  Musculoskeletal:         General: No tenderness or deformity  Normal range of motion  Cervical back: Normal range of motion and neck supple  No rigidity  Skin:     General: Skin is warm and dry  Capillary Refill: Capillary refill takes less than 2 seconds  Coloration: Skin is not jaundiced or pale  Findings: No bruising, erythema, lesion or rash  Neurological:      General: No focal deficit present  Mental Status: She is alert and oriented to person, place, and time  Cranial Nerves: No cranial nerve deficit  Sensory: No sensory deficit  Motor: No weakness or abnormal muscle tone        Coordination: Coordination normal          Vital Signs  ED Triage Vitals   Temperature Pulse Respirations Blood Pressure SpO2   08/14/22 0909 08/14/22 0909 08/14/22 0909 08/14/22 0909 08/14/22 0909   98 1 °F (36 7 °C) 85 18 (!) 191/90 98 %      Temp Source Heart Rate Source Patient Position - Orthostatic VS BP Location FiO2 (%)   08/14/22 0909 08/14/22 1010 08/14/22 0909 08/14/22 0909 --   Oral Monitor Sitting Right arm       Pain Score       --                  Vitals:    08/14/22 1010 08/14/22 1112 08/14/22 1228 08/14/22 1230   BP: (!) 192/84 (!) 178/83 (!) 174/82 (!) 174/82   Pulse: 76 81 84 85   Patient Position - Orthostatic VS: Sitting Lying Lying Sitting Visual Acuity      ED Medications  Medications   sodium chloride 0 9 % bolus 1,000 mL (0 mL Intravenous Stopped 8/14/22 1113)   methylPREDNISolone sodium succinate (Solu-MEDROL) injection 60 mg (60 mg Intravenous Given 8/14/22 1008)   ipratropium-albuterol (DUO-NEB) 0 5-2 5 mg/3 mL inhalation solution 3 mL (3 mL Nebulization Given 8/14/22 1008)       Diagnostic Studies  Results Reviewed     Procedure Component Value Units Date/Time    HS Troponin I 4hr [[id number]]     Lab Status: No result Specimen: Blood     Blood gas, venous [[id number]]  (Abnormal) Collected: 08/14/22 0956    Lab Status: Final result Specimen: Blood from Arm, Left Updated: 08/14/22 1126     pH, Alex 7 308     pCO2, Alex 79 3 mm Hg      pO2, Alex 31 0 mm Hg      HCO3, Alex 38 9 mmol/L      Base Excess, Alex 9 0 mmol/L      O2 Content, Alex 43 0 ml/dL      O2 HGB, VENOUS --    Narrative:      Performed on I-stat, O2 HGB unable to perform on I-stat analyzer     Manual Differential(PHLEBS Do Not Order) [[id number]]  (Abnormal) Collected: 08/14/22 0956    Lab Status: Final result Specimen: Blood from Arm, Left Updated: 08/14/22 1111     Segmented % 9 %      Lymphocytes % 91 %      Monocytes % 0 %      Eosinophils, % 0 %      Basophils % 0 %      Absolute Neutrophils 8 14 Thousand/uL      Lymphocytes Absolute 82 34 Thousand/uL      Monocytes Absolute 0 00 Thousand/uL      Eosinophils Absolute 0 00 Thousand/uL      Basophils Absolute 0 00 Thousand/uL      Total Counted --     Platelet Estimate Adequate    CBC and differential [[id number]]  (Abnormal) Collected: 08/14/22 0956    Lab Status: Final result Specimen: Blood from Arm, Left Updated: 08/14/22 1107     WBC 90 48 Thousand/uL      RBC 3 52 Million/uL      Hemoglobin 10 4 g/dL      Hematocrit 36 1 %       fL      MCH 29 5 pg      MCHC 28 8 g/dL      RDW 14 6 %      MPV 10 7 fL      Platelets 646 Thousands/uL     FLU/RSV/COVID - if FLU/RSV clinically relevant [[id number]]  (Normal) Collected: 08/14/22 1013    Lab Status: Final result Specimen: Nares from Nose Updated: 08/14/22 1055     SARS-CoV-2 Negative     INFLUENZA A PCR Negative     INFLUENZA B PCR Negative     RSV PCR Negative    Narrative:      FOR PEDIATRIC PATIENTS - copy/paste COVID Guidelines URL to browser: [email or web address]    SARS-CoV-2 assay is a Nucleic Acid Amplification assay intended for the  qualitative detection of nucleic acid from SARS-CoV-2 in nasopharyngeal  swabs  Results are for the presumptive identification of SARS-CoV-2 RNA  Positive results are indicative of infection with SARS-CoV-2, the virus  causing COVID-19, but do not rule out bacterial infection or co-infection  with other viruses  Laboratories within the [place] and its  territories are required to report all positive results to the appropriate  public health authorities  Negative results do not preclude SARS-CoV-2  infection and should not be used as the sole basis for treatment or other  patient management decisions  Negative results must be combined with  clinical observations, patient history, and epidemiological information  This test has not been FDA cleared or approved  This test has been authorized by FDA under an Emergency Use Authorization  (EUA)  This test is only authorized for the duration of time the  declaration that circumstances exist justifying the authorization of the  emergency use of an in vitro diagnostic tests for detection of SARS-CoV-2  virus and/or diagnosis of COVID-19 infection under section 564(b)(1) of  the Act, 21 U  S C  450NWF-4(N)(3), unless the authorization is terminated  or revoked sooner  The test has been validated but independent review by FDA  and CLIA is pending  Test performed using [id number] GeneXpert: This RT-PCR assay targets N2,  a region unique to SARS-CoV-2   A conserved region in the E-gene was chosen  for pan-Sarbecovirus detection which includes SARS-CoV-2  Protime-INR [[id number]]  (Normal) Collected: 08/14/22 0956    Lab Status: Final result Specimen: Blood from Arm, Left Updated: 08/14/22 1042     Protime 13 7 seconds      INR 1 07    APTT [[id number]]  (Normal) Collected: 08/14/22 0956    Lab Status: Final result Specimen: Blood from Arm, Left Updated: 08/14/22 1042     PTT 29 seconds     Lactic acid [[id number]]  (Abnormal) Collected: 08/14/22 0956    Lab Status: Final result Specimen: Blood from Arm, Left Updated: 08/14/22 1041     LACTIC ACID 0 3 mmol/L     Narrative:      Result may be elevated if tourniquet was used during collection  Blood culture #2 [[id number]] Collected: 08/14/22 1036    Lab Status:  In process Specimen: Blood from Arm, Left Updated: 08/14/22 1396    Basic metabolic panel [[id number]]  (Abnormal) Collected: 08/14/22 0956    Lab Status: Final result Specimen: Blood from Arm, Left Updated: 08/14/22 1032     Sodium 145 mmol/L      Potassium 3 5 mmol/L      Chloride 103 mmol/L      CO2 39 mmol/L      ANION GAP 3 mmol/L      BUN 18 mg/dL      Creatinine 0 87 mg/dL      Glucose 96 mg/dL      Calcium 9 5 mg/dL      eGFR 65 ml/min/1 73sq m     Narrative:      [place] guidelines for Chronic Kidney Disease (CKD):     Stage 1 with normal or high GFR (GFR > 90 mL/min/1 73 square meters)    Stage 2 Mild CKD (GFR = 60-89 mL/min/1 73 square meters)    Stage 3A Moderate CKD (GFR = 45-59 mL/min/1 73 square meters)    Stage 3B Moderate CKD (GFR = 30-44 mL/min/1 73 square meters)    Stage 4 Severe CKD (GFR = 15-29 mL/min/1 73 square meters)    Stage 5 End Stage CKD (GFR <15 mL/min/1 73 square meters)  Note: GFR calculation is accurate only with a steady state creatinine    Hepatic function panel [[id number]]  (Abnormal) Collected: 08/14/22 0956    Lab Status: Final result Specimen: Blood from Arm, Left Updated: 08/14/22 1032     Total Bilirubin 0 26 mg/dL      Bilirubin, Direct 0 09 mg/dL      Alkaline Phosphatase 105 U/L      AST 15 U/L      ALT 8 U/L      Total Protein 7 2 g/dL      Albumin 3 4 g/dL     HS Troponin I 2hr [[id number]]     Lab Status: No result Specimen: Blood     HS Troponin 0hr (reflex protocol) [[id number]]  (Normal) Collected: 08/14/22 0956    Lab Status: Final result Specimen: Blood from Arm, Left Updated: 08/14/22 1030     hs TnI 0hr 8 ng/L     Blood culture #1 [[id number]] Collected: 08/14/22 0956    Lab Status: In process Specimen: Blood from Arm, Left Updated: 08/14/22 1002                 XR chest 2 views   ED Interpretation by Radhika Duncan MD (08/14 1017)   No acute process  Final Result by Courtney Guzman MD (08/14 1026)      No active pulmonary disease  Workstation performed: [id number]                    Procedures  ECG 12 Lead Documentation Only    Date/Time: 8/14/2022 9:33 AM  Performed by: Radhika Duncan MD  Authorized by: Radhika Duncan MD     Indications / Diagnosis:  Weakness  ECG reviewed by me, the ED Provider: yes    Patient location:  ED  Previous ECG:     Previous ECG:  Compared to current    Comparison ECG info:  26 aug 2021    Similarity:  No change  Interpretation:     Interpretation: normal    Rate:     ECG rate:  79    ECG rate assessment: normal    Rhythm:     Rhythm: sinus rhythm    Comments:      Normal ekg  Unchanged from prior  ED Course                               SBIRT 20yo+    Flowsheet Row Most Recent Value   SBIRT (23 yo +)    In order to provide better care to our patients, we are screening all of our patients for alcohol and drug use  Would it be okay to ask you these screening questions? Yes Filed at: 08/14/2022 1374   Initial Alcohol Screen: US AUDIT-C     1  How often do you have a drink containing alcohol? 0 Filed at: 08/14/2022 0912   2  How many drinks containing alcohol do you have on a typical day you are drinking? 0 Filed at: 08/14/2022 0912   3a  Male UNDER 65:  How often do you have five or more drinks on one occasion? 0 Filed at: 08/14/2022 0912   3b  FEMALE Any Age, or MALE 65+: How often do you have 4 or more drinks on one occassion? 0 Filed at: 08/14/2022 0912   Audit-C Score 0 Filed at: 08/14/2022 1907   NIA: How many times in the past year have you    Used an illegal drug or used a prescription medication for non-medical reasons? Never Filed at: 08/14/2022 0912                    MDM  Number of Diagnoses or Management Options  Leukocytosis  Lymphoma ([place] [address] )  Weakness  Diagnosis management comments: Generalized weakness and poor appetite in setting of known malignancy  Leukocytosis; son and pt report that this is improved from last week's value as outpt which is not viewable in epic  I offered her admission for acute rehab placement, she refuses this and wants to be discharged home  Son at bedside will take pt home and provide short-term care for her  I placed referral for outpt PT  Amount and/or Complexity of Data Reviewed  Clinical lab tests: reviewed and ordered  Tests in the radiology section of CPT®: reviewed and ordered  Tests in the medicine section of CPT®: ordered and reviewed        Disposition  Final diagnoses:   Weakness   Lymphoma ([facility] )   Leukocytosis     Time reflects when diagnosis was documented in both MDM as applicable and the Disposition within this note     Time User Action Codes Description Comment    8/14/2022 12:53 PM Rena Kocher Add [R53 1] Weakness     8/14/2022 12:53 PM PatelJohn Add [C92 10] CML (chronic myelocytic leukemia) ([place] [address] )     8/14/2022 12:53 PM John Patel Remove [C92 10] CML (chronic myelocytic leukemia) ([place] [address] )     8/14/2022 12:53 PM Elwyn Slipper [C85 90] Lymphoma ([place] [address] )     8/14/2022 12:53 PM Rena Kocher Add [I89 330] Leukocytosis       ED Disposition     ED Disposition   Discharge    Condition   Stable    Date/Time   Sun Aug 14, 2022 12:53 PM    Comment   [place] discharge to home/self care                 Follow-up Information     Follow up With Specialties Details Why Contact Info Additional Information    [id number] [facility] Emergency Department Emergency Medicine  If symptoms worsen [address] Emergency Department, [address] [place] [address], [id number]          Patient's Medications   Discharge Prescriptions    No medications on file           PDMP Review       Value Time User    PDMP Reviewed  Yes 8/27/2021  3:13 AM Azeb Morales PA-C          ED Provider  Electronically Signed by           Jean Claude Waller MD  08/14/22 0271

## 2023-04-30 ENCOUNTER — APPOINTMENT (EMERGENCY)
Dept: RADIOLOGY | Facility: HOSPITAL | Age: 78
End: 2023-04-30

## 2023-04-30 ENCOUNTER — APPOINTMENT (EMERGENCY)
Dept: CT IMAGING | Facility: HOSPITAL | Age: 78
End: 2023-04-30

## 2023-04-30 ENCOUNTER — HOSPITAL ENCOUNTER (INPATIENT)
Facility: HOSPITAL | Age: 78
LOS: 4 days | Discharge: NURSING, REHAB OR LONG-TERM CARE | End: 2023-05-05
Attending: EMERGENCY MEDICINE | Admitting: INTERNAL MEDICINE

## 2023-04-30 DIAGNOSIS — J44.1 COPD WITH ACUTE EXACERBATION (HCC): Primary | ICD-10-CM

## 2023-04-30 DIAGNOSIS — J69.0 ASPIRATION PNEUMONIA (HCC): ICD-10-CM

## 2023-04-30 DIAGNOSIS — K62.5 BRBPR (BRIGHT RED BLOOD PER RECTUM): ICD-10-CM

## 2023-04-30 DIAGNOSIS — D64.9 ANEMIA, UNSPECIFIED TYPE: ICD-10-CM

## 2023-04-30 LAB
2HR DELTA HS TROPONIN: 1 NG/L
ALBUMIN SERPL BCP-MCNC: 3.7 G/DL (ref 3.5–5)
ALP SERPL-CCNC: 74 U/L (ref 34–104)
ALT SERPL W P-5'-P-CCNC: 7 U/L (ref 7–52)
ANION GAP SERPL CALCULATED.3IONS-SCNC: 2 MMOL/L (ref 4–13)
APTT PPP: 30 SECONDS (ref 23–37)
AST SERPL W P-5'-P-CCNC: 16 U/L (ref 13–39)
ATRIAL RATE: 80 BPM
BACTERIA UR QL AUTO: ABNORMAL /HPF
BASOPHILS # BLD AUTO: 0.03 THOUSANDS/ΜL (ref 0–0.1)
BASOPHILS NFR BLD AUTO: 0 % (ref 0–1)
BILIRUB SERPL-MCNC: 0.25 MG/DL (ref 0.2–1)
BILIRUB UR QL STRIP: NEGATIVE
BNP SERPL-MCNC: 46 PG/ML (ref 0–100)
BUN SERPL-MCNC: 18 MG/DL (ref 5–25)
CALCIUM SERPL-MCNC: 10 MG/DL (ref 8.4–10.2)
CARDIAC TROPONIN I PNL SERPL HS: 5 NG/L
CARDIAC TROPONIN I PNL SERPL HS: 6 NG/L
CHLORIDE SERPL-SCNC: 88 MMOL/L (ref 96–108)
CLARITY UR: CLEAR
CO2 SERPL-SCNC: 44 MMOL/L (ref 21–32)
COLOR UR: ABNORMAL
CREAT SERPL-MCNC: 0.64 MG/DL (ref 0.6–1.3)
EOSINOPHIL # BLD AUTO: 0.05 THOUSAND/ΜL (ref 0–0.61)
EOSINOPHIL NFR BLD AUTO: 0 % (ref 0–6)
ERYTHROCYTE [DISTWIDTH] IN BLOOD BY AUTOMATED COUNT: 15.1 % (ref 11.6–15.1)
FLUAV RNA RESP QL NAA+PROBE: NEGATIVE
FLUBV RNA RESP QL NAA+PROBE: NEGATIVE
GFR SERPL CREATININE-BSD FRML MDRD: 86 ML/MIN/1.73SQ M
GLUCOSE SERPL-MCNC: 121 MG/DL (ref 65–140)
GLUCOSE UR STRIP-MCNC: NEGATIVE MG/DL
HCT VFR BLD AUTO: 33.3 % (ref 34.8–46.1)
HGB BLD-MCNC: 10.3 G/DL (ref 11.5–15.4)
HGB UR QL STRIP.AUTO: NEGATIVE
IMM GRANULOCYTES # BLD AUTO: 0.06 THOUSAND/UL (ref 0–0.2)
IMM GRANULOCYTES NFR BLD AUTO: 0 % (ref 0–2)
INR PPP: 1.04 (ref 0.84–1.19)
KETONES UR STRIP-MCNC: NEGATIVE MG/DL
LACTATE SERPL-SCNC: 0.6 MMOL/L (ref 0.5–2)
LEUKOCYTE ESTERASE UR QL STRIP: NEGATIVE
LYMPHOCYTES # BLD AUTO: 5.54 THOUSANDS/ΜL (ref 0.6–4.47)
LYMPHOCYTES NFR BLD AUTO: 34 % (ref 14–44)
MAGNESIUM SERPL-MCNC: 1.4 MG/DL (ref 1.9–2.7)
MCH RBC QN AUTO: 29.6 PG (ref 26.8–34.3)
MCHC RBC AUTO-ENTMCNC: 30.9 G/DL (ref 31.4–37.4)
MCV RBC AUTO: 96 FL (ref 82–98)
MONOCYTES # BLD AUTO: 1.16 THOUSAND/ΜL (ref 0.17–1.22)
MONOCYTES NFR BLD AUTO: 7 % (ref 4–12)
NEUTROPHILS # BLD AUTO: 9.41 THOUSANDS/ΜL (ref 1.85–7.62)
NEUTS SEG NFR BLD AUTO: 59 % (ref 43–75)
NITRITE UR QL STRIP: NEGATIVE
NON-SQ EPI CELLS URNS QL MICRO: ABNORMAL /HPF
NRBC BLD AUTO-RTO: 0 /100 WBCS
P AXIS: 83 DEGREES
PH UR STRIP.AUTO: 5.5 [PH]
PLATELET # BLD AUTO: 192 THOUSANDS/UL (ref 149–390)
PMV BLD AUTO: 10.1 FL (ref 8.9–12.7)
POTASSIUM SERPL-SCNC: 4.2 MMOL/L (ref 3.5–5.3)
PR INTERVAL: 124 MS
PROCALCITONIN SERPL-MCNC: <0.05 NG/ML
PROT SERPL-MCNC: 7.3 G/DL (ref 6.4–8.4)
PROT UR STRIP-MCNC: ABNORMAL MG/DL
PROTHROMBIN TIME: 13.4 SECONDS (ref 11.6–14.5)
QRS AXIS: 85 DEGREES
QRSD INTERVAL: 82 MS
QT INTERVAL: 376 MS
QTC INTERVAL: 433 MS
RBC # BLD AUTO: 3.48 MILLION/UL (ref 3.81–5.12)
RBC #/AREA URNS AUTO: ABNORMAL /HPF
RSV RNA RESP QL NAA+PROBE: NEGATIVE
SARS-COV-2 RNA RESP QL NAA+PROBE: NEGATIVE
SODIUM SERPL-SCNC: 134 MMOL/L (ref 135–147)
SP GR UR STRIP.AUTO: 1.02 (ref 1–1.03)
T WAVE AXIS: 69 DEGREES
TSH SERPL DL<=0.05 MIU/L-ACNC: 0.42 UIU/ML (ref 0.45–4.5)
UROBILINOGEN UR STRIP-ACNC: <2 MG/DL
VENTRICULAR RATE: 80 BPM
WBC # BLD AUTO: 16.25 THOUSAND/UL (ref 4.31–10.16)
WBC #/AREA URNS AUTO: ABNORMAL /HPF

## 2023-04-30 RX ORDER — VILAZODONE HYDROCHLORIDE 20 MG/1
40 TABLET ORAL
Status: DISCONTINUED | OUTPATIENT
Start: 2023-05-01 | End: 2023-05-05 | Stop reason: HOSPADM

## 2023-04-30 RX ORDER — ENOXAPARIN SODIUM 100 MG/ML
40 INJECTION SUBCUTANEOUS DAILY
Status: DISCONTINUED | OUTPATIENT
Start: 2023-04-30 | End: 2023-05-01

## 2023-04-30 RX ORDER — BUPROPION HYDROCHLORIDE 75 MG/1
75 TABLET ORAL DAILY
Status: DISCONTINUED | OUTPATIENT
Start: 2023-04-30 | End: 2023-05-05 | Stop reason: HOSPADM

## 2023-04-30 RX ORDER — CARVEDILOL 12.5 MG/1
25 TABLET ORAL 2 TIMES DAILY WITH MEALS
Status: DISCONTINUED | OUTPATIENT
Start: 2023-04-30 | End: 2023-05-05 | Stop reason: HOSPADM

## 2023-04-30 RX ORDER — LISINOPRIL 20 MG/1
20 TABLET ORAL 2 TIMES DAILY
Status: DISCONTINUED | OUTPATIENT
Start: 2023-04-30 | End: 2023-05-05 | Stop reason: HOSPADM

## 2023-04-30 RX ORDER — FERROUS SULFATE 325(65) MG
325 TABLET ORAL
Status: DISCONTINUED | OUTPATIENT
Start: 2023-05-01 | End: 2023-05-05 | Stop reason: HOSPADM

## 2023-04-30 RX ORDER — GUAIFENESIN 600 MG/1
600 TABLET, EXTENDED RELEASE ORAL 2 TIMES DAILY
Status: DISCONTINUED | OUTPATIENT
Start: 2023-04-30 | End: 2023-05-05 | Stop reason: HOSPADM

## 2023-04-30 RX ORDER — FUROSEMIDE 20 MG/1
20 TABLET ORAL DAILY
Status: DISCONTINUED | OUTPATIENT
Start: 2023-04-30 | End: 2023-05-05 | Stop reason: HOSPADM

## 2023-04-30 RX ORDER — METHYLPREDNISOLONE SODIUM SUCCINATE 125 MG/2ML
125 INJECTION, POWDER, LYOPHILIZED, FOR SOLUTION INTRAMUSCULAR; INTRAVENOUS ONCE
Status: COMPLETED | OUTPATIENT
Start: 2023-04-30 | End: 2023-04-30

## 2023-04-30 RX ORDER — METHYLPREDNISOLONE SODIUM SUCCINATE 40 MG/ML
40 INJECTION, POWDER, LYOPHILIZED, FOR SOLUTION INTRAMUSCULAR; INTRAVENOUS EVERY 12 HOURS SCHEDULED
Status: DISCONTINUED | OUTPATIENT
Start: 2023-04-30 | End: 2023-05-01

## 2023-04-30 RX ORDER — BUDESONIDE AND FORMOTEROL FUMARATE DIHYDRATE 160; 4.5 UG/1; UG/1
2 AEROSOL RESPIRATORY (INHALATION) 2 TIMES DAILY
Status: DISCONTINUED | OUTPATIENT
Start: 2023-04-30 | End: 2023-05-05 | Stop reason: HOSPADM

## 2023-04-30 RX ORDER — MAGNESIUM SULFATE HEPTAHYDRATE 40 MG/ML
2 INJECTION, SOLUTION INTRAVENOUS ONCE
Status: COMPLETED | OUTPATIENT
Start: 2023-04-30 | End: 2023-04-30

## 2023-04-30 RX ORDER — NICOTINE 21 MG/24HR
1 PATCH, TRANSDERMAL 24 HOURS TRANSDERMAL DAILY
Status: DISCONTINUED | OUTPATIENT
Start: 2023-04-30 | End: 2023-05-05 | Stop reason: HOSPADM

## 2023-04-30 RX ORDER — NITROGLYCERIN 0.4 MG/1
0.4 TABLET SUBLINGUAL
Status: DISCONTINUED | OUTPATIENT
Start: 2023-04-30 | End: 2023-05-05 | Stop reason: HOSPADM

## 2023-04-30 RX ORDER — IPRATROPIUM BROMIDE AND ALBUTEROL SULFATE 2.5; .5 MG/3ML; MG/3ML
3 SOLUTION RESPIRATORY (INHALATION) ONCE
Status: COMPLETED | OUTPATIENT
Start: 2023-04-30 | End: 2023-04-30

## 2023-04-30 RX ORDER — BENZONATATE 100 MG/1
100 CAPSULE ORAL 3 TIMES DAILY PRN
Status: DISCONTINUED | OUTPATIENT
Start: 2023-04-30 | End: 2023-05-05 | Stop reason: HOSPADM

## 2023-04-30 RX ORDER — ASPIRIN 81 MG/1
81 TABLET ORAL DAILY
Status: DISCONTINUED | OUTPATIENT
Start: 2023-04-30 | End: 2023-05-05 | Stop reason: HOSPADM

## 2023-04-30 RX ORDER — LEVALBUTEROL 1.25 MG/.5ML
1.25 SOLUTION, CONCENTRATE RESPIRATORY (INHALATION)
Status: DISCONTINUED | OUTPATIENT
Start: 2023-04-30 | End: 2023-05-02 | Stop reason: CLARIF

## 2023-04-30 RX ORDER — ACETAMINOPHEN 325 MG/1
650 TABLET ORAL EVERY 6 HOURS PRN
Status: DISCONTINUED | OUTPATIENT
Start: 2023-04-30 | End: 2023-05-05 | Stop reason: HOSPADM

## 2023-04-30 RX ORDER — IPRATROPIUM BROMIDE AND ALBUTEROL SULFATE 2.5; .5 MG/3ML; MG/3ML
3 SOLUTION RESPIRATORY (INHALATION)
Status: DISCONTINUED | OUTPATIENT
Start: 2023-04-30 | End: 2023-04-30

## 2023-04-30 RX ORDER — LANOLIN ALCOHOL/MO/W.PET/CERES
3 CREAM (GRAM) TOPICAL
Status: DISCONTINUED | OUTPATIENT
Start: 2023-04-30 | End: 2023-05-05 | Stop reason: HOSPADM

## 2023-04-30 RX ORDER — HYDROCODONE BITARTRATE AND ACETAMINOPHEN 5; 325 MG/1; MG/1
1 TABLET ORAL EVERY 6 HOURS PRN
Status: DISCONTINUED | OUTPATIENT
Start: 2023-04-30 | End: 2023-05-05 | Stop reason: HOSPADM

## 2023-04-30 RX ORDER — HYDROCHLOROTHIAZIDE 25 MG/1
25 TABLET ORAL DAILY
Status: DISCONTINUED | OUTPATIENT
Start: 2023-04-30 | End: 2023-05-05 | Stop reason: HOSPADM

## 2023-04-30 RX ORDER — FENTANYL CITRATE 50 UG/ML
50 INJECTION, SOLUTION INTRAMUSCULAR; INTRAVENOUS ONCE
Status: COMPLETED | OUTPATIENT
Start: 2023-04-30 | End: 2023-04-30

## 2023-04-30 RX ADMIN — IPRATROPIUM BROMIDE AND ALBUTEROL SULFATE 3 ML: .5; 3 SOLUTION RESPIRATORY (INHALATION) at 12:41

## 2023-04-30 RX ADMIN — IPRATROPIUM BROMIDE 0.5 MG: 0.5 SOLUTION RESPIRATORY (INHALATION) at 19:22

## 2023-04-30 RX ADMIN — AZITHROMYCIN MONOHYDRATE 500 MG: 500 INJECTION, POWDER, LYOPHILIZED, FOR SOLUTION INTRAVENOUS at 16:19

## 2023-04-30 RX ADMIN — LISINOPRIL 20 MG: 20 TABLET ORAL at 17:54

## 2023-04-30 RX ADMIN — FENTANYL CITRATE 50 MCG: 50 INJECTION INTRAMUSCULAR; INTRAVENOUS at 10:24

## 2023-04-30 RX ADMIN — FUROSEMIDE 20 MG: 20 TABLET ORAL at 16:05

## 2023-04-30 RX ADMIN — METHYLPREDNISOLONE SODIUM SUCCINATE 125 MG: 125 INJECTION, POWDER, FOR SOLUTION INTRAMUSCULAR; INTRAVENOUS at 14:24

## 2023-04-30 RX ADMIN — BUDESONIDE AND FORMOTEROL FUMARATE DIHYDRATE 2 PUFF: 160; 4.5 AEROSOL RESPIRATORY (INHALATION) at 18:00

## 2023-04-30 RX ADMIN — BUPROPION HYDROCHLORIDE TABLETS 75 MG: 75 TABLET, FILM COATED ORAL at 16:05

## 2023-04-30 RX ADMIN — METHYLPREDNISOLONE SODIUM SUCCINATE 40 MG: 40 INJECTION, POWDER, FOR SOLUTION INTRAMUSCULAR; INTRAVENOUS at 20:53

## 2023-04-30 RX ADMIN — MAGNESIUM SULFATE HEPTAHYDRATE 2 G: 40 INJECTION, SOLUTION INTRAVENOUS at 10:40

## 2023-04-30 RX ADMIN — CARVEDILOL 25 MG: 12.5 TABLET, FILM COATED ORAL at 16:05

## 2023-04-30 RX ADMIN — LEVALBUTEROL HYDROCHLORIDE 1.25 MG: 1.25 SOLUTION, CONCENTRATE RESPIRATORY (INHALATION) at 19:22

## 2023-04-30 RX ADMIN — ENOXAPARIN SODIUM 40 MG: 40 INJECTION SUBCUTANEOUS at 16:05

## 2023-04-30 RX ADMIN — ASPIRIN 81 MG: 81 TABLET, COATED ORAL at 16:05

## 2023-04-30 RX ADMIN — GUAIFENESIN 600 MG: 600 TABLET ORAL at 17:54

## 2023-04-30 RX ADMIN — HYDROCHLOROTHIAZIDE 25 MG: 25 TABLET ORAL at 16:05

## 2023-04-30 RX ADMIN — IPRATROPIUM BROMIDE AND ALBUTEROL SULFATE 3 ML: .5; 3 SOLUTION RESPIRATORY (INHALATION) at 10:24

## 2023-04-30 RX ADMIN — IOHEXOL 80 ML: 350 INJECTION, SOLUTION INTRAVENOUS at 11:37

## 2023-04-30 NOTE — ASSESSMENT & PLAN NOTE
· Blood pressure adequately controlled at this time  · Continue home blood pressure medication  · Continue to monitor

## 2023-04-30 NOTE — ASSESSMENT & PLAN NOTE
· Patient with baseline anemia around 8-9  · Hemoglobin currently 10 6  · May be hemoconcentrated  · Continue to monitor

## 2023-04-30 NOTE — ASSESSMENT & PLAN NOTE
· Patient coming in secondary to worsening shortness of breath over the past couple days  · Patient received nebulizer and steroids in the emergency department  · CTA chest abdomen pelvis-no pulmonary embolism, emphysema, chronic bronchitis, tree-in-bud opacities throughout both lungs  · Slight leukocytosis  · will hold off on any antibiotics for possible pneumonia given negative imaging findings  · Follow-up procalcitonin  · Continue IV steroids  · Nebulizers as needed  · Respiratory protocol  · Continue to monitor

## 2023-04-30 NOTE — ASSESSMENT & PLAN NOTE
· Patient with baseline anemia around 8-9  · Hemoglobin currently 10 3  · May be hemoconcentrated  · Continue to monitor

## 2023-04-30 NOTE — ASSESSMENT & PLAN NOTE
· Patient coming in secondary to worsening shortness of breath over the past couple days  · Patient received nebulizer and steroids in the emergency department  · CTA chest abdomen pelvis: no pulmonary embolism, emphysema, chronic bronchitis, tree-in-bud opacities throughout both lungs  · Slight leukocytosis  · Will hold off on any antibiotics for possible pneumonia given negative imaging findings  · 5/1: Procalcitonin <0 05  · Continue IV steroids  · Nebulizers as needed  · Respiratory protocol  · Continue to monitor

## 2023-04-30 NOTE — H&P
[address] [place] [address]  H&P  Name: Kristin Jeffrey 69 y o  female I MRN: [id number]  Unit/Bed#: -01 I Date of Admission: 4/30/2023   Date of Service: 4/30/2023 I Hospital Day: 0      Assessment/Plan   * Chronic obstructive pulmonary disease with acute exacerbation ([facility] )  Assessment & Plan  · Patient coming in secondary to worsening shortness of breath over the past couple days  · Patient received nebulizer and steroids in the emergency department  · CTA chest abdomen pelvis-no pulmonary embolism, emphysema, chronic bronchitis, tree-in-bud opacities throughout both lungs  · Slight leukocytosis  · will hold off on any antibiotics for possible pneumonia given negative imaging findings  · Follow-up procalcitonin  · Continue IV steroids  · Nebulizers as needed  · Respiratory protocol  · Continue to monitor    Anemia  Assessment & Plan  · Patient with baseline anemia around 8-9  · Hemoglobin currently 10 3  · May be hemoconcentrated  · Continue to monitor    Major depressive disorder, recurrent episode, moderate ([facility] )  Assessment & Plan  · Continue home medications Wellbutrin and viibryd    CAD (coronary artery disease), native coronary artery  Assessment & Plan  · Continue home medications    Chronic respiratory failure with hypoxia (HCC)  Assessment & Plan  · Chronically on 3-4 L nasal cannula  · Currently on 3 L nasal cannula    Essential hypertension  Assessment & Plan  · Blood pressure adequately controlled at this time  · Continue home blood pressure medication  · Continue to monitor    VTE Pharmacologic Prophylaxis: VTE Score: 3 Moderate Risk (Score 3-4) - Pharmacological DVT Prophylaxis Ordered: enoxaparin (Lovenox)  Code Status: Level 3 - DNAR and DNI   Discussion with family: Patient declined call to   Anticipated Length of Stay: Patient will be admitted on an observation basis with an anticipated length of stay of less than 2 midnights secondary to COPD exacerbation      Total Time Spent on Date of Encounter in care of patient: 75 minutes This time was spent on one or more of the following: performing physical exam; counseling and coordination of care; obtaining or reviewing history; documenting in the medical record; reviewing/ordering tests, medications or procedures; communicating with other healthcare professionals and discussing with patient's family/caregivers  Chief Complaint: Shortness of breath    History of Present Illness:  Samantha Laws is a 69 y o  female with a PMH of COPD, hypertension, CAD who presents with worsening shortness of breath  Patient has had worsening shortness of breath over the past couple days and has not been able to receive nebulizer treatments as the facility she is at does not do these she states  Patient denies any associated fever, chill, nausea, lightheadedness, dizziness, or sputum production  Review of Systems:  Review of Systems   Constitutional: Negative for chills, fatigue, fever and unexpected weight change  HENT: Negative for congestion, ear pain, sore throat and tinnitus  Eyes: Negative for visual disturbance  Respiratory: Positive for cough and shortness of breath  Negative for chest tightness and wheezing  Cardiovascular: Negative for chest pain, palpitations and leg swelling  Gastrointestinal: Negative for abdominal pain, constipation, diarrhea, nausea and vomiting  Genitourinary: Negative for dysuria and frequency  Skin: Negative for rash  Neurological: Negative for dizziness, weakness, light-headedness, numbness and headaches  All other systems reviewed and are negative        Past Medical and Surgical History:   Past Medical History:   Diagnosis Date    Acute congestive heart failure ([facility] ) 8/27/2021    Anxiety     Cardiac disease     Chest pain 8/27/2021    Chronic pain disorder     COPD (chronic obstructive pulmonary disease) (HCC)     Depression     Heart disease     Hyperlipidemia     Hypertension  MI (myocardial infarction) ([facility] )     MRSA (methicillin resistant Staphylococcus aureus)     Renal disorder     benign kidney tumor       Past Surgical History:   Procedure Laterality Date    APPENDECTOMY      ELBOW BURSA SURGERY Left 02/2018    D/T MRSA INFECTION    SPINAL FUSION      L7 L9       Meds/Allergies:  Prior to Admission medications    Medication Sig Start Date End Date Taking? Authorizing Provider   albuterol (VENTOLIN HFA) 90 mcg/act inhaler Inhale 2 puffs every 6 (six) hours as needed for wheezing 1/7/20   Enoc Garcia PA-C   aspirin (ECOTRIN LOW STRENGTH) 81 mg EC tablet Take 1 tablet (81 mg total) by mouth daily 8/31/21 9/30/21  Yris Holloway MD   benzonatate (TESSALON PERLES) 100 mg capsule Take 1 capsule (100 mg total) by mouth 3 (three) times a day as needed for cough 3/7/23   Gillian Wang PA-C   budesonide-formoterol (SYMBICORT) 160-4 5 mcg/act inhaler Inhale 2 puffs 2 (two) times a day Rinse mouth after use   1/7/20   Enoc Garcia PA-C   buPROPion [facility]) 75 mg tablet Take 75 mg by mouth in the morning    Historical Provider, MD   Calcium-Magnesium-Vitamin D (CALCIUM 500 PO) Take 1,000 mg by mouth daily    Historical Provider, MD   carvedilol (COREG) 25 mg tablet Take 25 mg by mouth 2 (two) times a day with meals    Historical Provider, MD   enalapril (VASOTEC) 5 mg tablet Take 1 tablet (5 mg total) by mouth 2 (two) times a day  Patient taking differently: Take 20 mg by mouth 2 (two) times a day 1/8/20   Enoc Garcia PA-C   ferrous sulfate 325 (65 Fe) mg tablet Take 325 mg by mouth daily with breakfast    Historical Provider, MD   furosemide (LASIX) 20 mg tablet Take 1 tablet (20 mg total) by mouth daily 8/31/21 9/30/21  Yris Holloway MD   guaiFENesin (MUCINEX) 600 mg 12 hr tablet Take 1 tablet (600 mg total) by mouth 2 (two) times a day 12/23/18   Russell Valerio MD   hydrochlorothiazide (HYDRODIURIL) 25 mg tablet Take 1 tablet (25 mg total) by mouth daily 1/7/20   Althea Garcia PA-C   HYDROcodone-acetaminophen [facility]) 5-325 mg per tablet Take 1 tablet by mouth every 6 (six) hours as needed for pain    Historical Provider, MD   melatonin 3 mg Take 1 tablet (3 mg total) by mouth daily at bedtime as needed (insomnia) 1/7/20   TAMMIE Marte   nitroglycerin (NITROSTAT) 0 4 mg SL tablet Place 0 4 mg under the tongue every 5 (five) minutes as needed for chest pain    Historical Provider, MD   pravastatin (PRAVACHOL) 20 mg tablet Take 1 tablet (20 mg total) by mouth daily with dinner  Patient not taking: Reported on 3/31/2023 8/30/21 9/29/21  Bishnu Gupta MD   sodium chloride 0 9 % nebulizer solution Take 3 mL by nebulization 3 (three) times a day 12/23/18   Freedom Geiger MD   vilazodone (Viibryd) 40 mg tablet Take 40 mg by mouth daily with breakfast    Historical Provider, MD Schneiderrutinib 80 MG CAPS Take 160 mg by mouth 2 (two) times a day    Historical Provider, MD VALDEZ have reviewed home medications with patient personally  Allergies:    Allergies   Allergen Reactions    Sulfa Antibiotics Anaphylaxis    Niacin     Statins Other (See Comments)     cramps       Social History:  Marital Status:    Substance Use History:   Social History     Substance and Sexual Activity   Alcohol Use Never     Social History     Tobacco Use   Smoking Status Every Day    Packs/day: 1 00    Years: 60 00    Pack years: 60 00    Types: Cigarettes   Smokeless Tobacco Never   Tobacco Comments    She has close to a 60 pack-year smoking history, most recently has cut down to 4-5 cigarettes daily     Social History     Substance and Sexual Activity   Drug Use No       Family History:  Family History   Problem Relation Age of Onset    Heart disease Mother     Cancer Father        Physical Exam:     Vitals:   Blood Pressure: (!) 180/88 (04/30/23 1453)  Pulse: 99 (04/30/23 1451)  Temperature: 97 8 °F (36 6 °C) (04/30/23 0917)  Temp Source: Oral (04/30/23 6983)  Respirations: 18 (04/30/23 1451)  SpO2: 99 % (04/30/23 1451)    Physical Exam  Vitals and nursing note reviewed  Constitutional:       General: She is not in acute distress  Appearance: She is well-developed  Comments: 4 L nasal cannula  Chronically ill-appearing   HENT:      Head: Normocephalic and atraumatic  Eyes:      General: No scleral icterus  Conjunctiva/sclera: Conjunctivae normal    Cardiovascular:      Rate and Rhythm: Normal rate and regular rhythm  Heart sounds: Normal heart sounds  No murmur heard  No friction rub  No gallop  Pulmonary:      Effort: Pulmonary effort is normal  No respiratory distress  Breath sounds: Wheezing present  No rales  Abdominal:      General: Bowel sounds are normal  There is no distension  Palpations: Abdomen is soft  Tenderness: There is no abdominal tenderness  Musculoskeletal:         General: Normal range of motion  Skin:     General: Skin is warm  Findings: No rash  Neurological:      Mental Status: She is alert and oriented to person, place, and time            Additional Data:     Lab Results:  Results from last 7 days   Lab Units 04/30/23  0939   WBC Thousand/uL 16 25*   HEMOGLOBIN g/dL 10 3*   HEMATOCRIT % 33 3*   PLATELETS Thousands/uL 192   NEUTROS PCT % 59   LYMPHS PCT % 34   MONOS PCT % 7   EOS PCT % 0     Results from last 7 days   Lab Units 04/30/23  0939   SODIUM mmol/L 134*   POTASSIUM mmol/L 4 2   CHLORIDE mmol/L 88*   CO2 mmol/L 44*   BUN mg/dL 18   CREATININE mg/dL 0 64   ANION GAP mmol/L 2*   CALCIUM mg/dL 10 0   ALBUMIN g/dL 3 7   TOTAL BILIRUBIN mg/dL 0 25   ALK PHOS U/L 74   ALT U/L 7   AST U/L 16   GLUCOSE RANDOM mg/dL 121     Results from last 7 days   Lab Units 04/30/23  0939   INR  1 04             Results from last 7 days   Lab Units 04/30/23  0939   LACTIC ACID mmol/L 0 6       Lines/Drains:  Invasive Devices     Peripheral Intravenous Line  Duration Peripheral IV 04/30/23 Left Antecubital <1 day          Drain  Duration           External Urinary Catheter 30 days                    Imaging: Reviewed radiology reports from this admission including: chest CT scan and abdominal/pelvic CT  PE Study with CT Abdomen and Pelvis with contrast   Final Result by Yeimy Santos MD (04/30 3710)      1  No pulmonary embolus  2   Emphysema  3   Chronic bronchitis  4   Tree-in-bud opacities throughout both lungs, most likely due to bronchiolitis  5   No evidence of acute abnormality in the abdomen or pelvis  Workstation performed: [id number]         XR chest 1 view portable   ED Interpretation by Avila Olvera MD (04/30 1031)   NAD          EKG and Other Studies Reviewed on Admission:   · EKG: NSR  HR 80     ** Please Note: This note has been constructed using a voice recognition system   **

## 2023-04-30 NOTE — RESPIRATORY THERAPY NOTE
RT Protocol Note  Rufino Thompson 69 y o  female MRN: [id number]  Unit/Bed#: -01 Encounter: [id number]    Assessment    Principal Problem:    Chronic obstructive pulmonary disease with acute exacerbation ([facility] )  Active Problems:    Essential hypertension    Tobacco abuse    Chronic respiratory failure with hypoxia (HCC)    CAD (coronary artery disease), native coronary artery    Major depressive disorder, recurrent episode, moderate (HCC)    Anemia      Home Pulmonary Medications:     04/30/23 1715   Respiratory Protocol   Protocol Initiated? Yes   Protocol Selection Respiratory   Language Barrier? No   Medical & Social History Reviewed? Yes   Diagnostic Studies Reviewed? Yes   Physical Assessment Performed? Yes   Home Devices/Therapy Home O2   Respiratory Plan Mild Distress pathway   Respiratory Assessment   Assessment Type Assess only   General Appearance Alert; Awake   Respiratory Pattern Dyspnea with exertion;Labored   Chest Assessment Chest expansion symmetrical   Bilateral Breath Sounds Rhonchi;Expiratory wheezes   Resp Comments resp protocol completed, HX COPD wears 4L O2 and takes nebs TID at home   BBS scattered wheezes and rhonchi, spo2 94%, will continue home regimen   O2 Device nc       Home Devices/Therapy: Home O2    Past Medical History:   Diagnosis Date    Acute congestive heart failure ([facility] ) 8/27/2021    Anxiety     Cardiac disease     Chest pain 8/27/2021    Chronic pain disorder     COPD (chronic obstructive pulmonary disease) ([facility])     Depression     Heart disease     Hyperlipidemia     Hypertension     MI (myocardial infarction) ([place] [address] )     MRSA (methicillin resistant Staphylococcus aureus)     Renal disorder     benign kidney tumor     Social History     Socioeconomic History    Marital status:      Spouse name: None    Number of children: 3    Years of education: 13    Highest education level: High school graduate   Occupational History    Occupation: [address]     Employer: "[employer]     Comment: retired    Tobacco Use    Smoking status: Every Day     Packs/day: 1 00     Years: 60 00     Pack years: 60 00     Types: Cigarettes    Smokeless tobacco: Never    Tobacco comments:     She has close to a 60 pack-year smoking history, most recently has cut down to 4-5 cigarettes daily   Vaping Use    Vaping Use: Never used   Substance and Sexual Activity    Alcohol use: Never    Drug use: No    Sexual activity: Not Currently   Other Topics Concern    None   Social History Narrative    None     Social Determinants of Health     Financial Resource Strain: Not on file   Food Insecurity: No Food Insecurity    Worried About Running Out of Food in the Last Year: Never true    Ran Out of Food in the Last Year: Never true   Transportation Needs: No Transportation Needs    Lack of Transportation (Medical): No    Lack of Transportation (Non-Medical): No   Physical Activity: Not on file   Stress: Not on file   Social Connections: Not on file   Intimate Partner Violence: Not on file   Housing Stability: Low Risk     Unable to Pay for Housing in the Last Year: No    Number of Places Lived in the Last Year: 2    Unstable Housing in the Last Year: No       Subjective         Objective    Physical Exam:   Assessment Type: Assess only  General Appearance: Alert, Awake  Respiratory Pattern: Dyspnea with exertion, Labored  Chest Assessment: Chest expansion symmetrical  Bilateral Breath Sounds: Rhonchi, Expiratory wheezes  O2 Device: nc    Vitals:  Blood pressure (!) 190/93, pulse 93, temperature 98 °F (36 7 °C), temperature source Oral, resp  rate 20, height 5' 7\" (1 702 m), weight 46 3 kg (102 lb 1 2 oz), SpO2 91 %, not currently breastfeeding  Imaging and other studies: I have personally reviewed pertinent reports  O2 Device: nc     Plan    Respiratory Plan: Mild Distress pathway        Resp Comments: resp protocol completed, HX COPD wears 4L O2 and takes nebs TID at home   BBS " scattered wheezes and rhonchi, spo2 94%, will continue home regimen

## 2023-04-30 NOTE — ED PROVIDER NOTES
"History  Chief Complaint   Patient presents with    Shortness of Breath     Pt c/o worsening sob since last night, pt has a hx of copd, pt wears 2-3 L O2 at baseline and was 94% on 5L on EMS arrival        History provided by:  Patient  Shortness of Breath  Severity:  Moderate  Onset quality:  Gradual  Duration:  2 days  Timing:  Intermittent  Progression:  Worsening  Chronicity:  Recurrent  Context: not weather changes    Context comment:  H/o COPD and CHF recurrent hospitalizations recently, states \"hasn't felt well since iron infusion\"  Relieved by: nebs  Worsened by:  Coughing  Ineffective treatments:  Oxygen  Associated symptoms: cough (dry) and wheezing    Associated symptoms: no abdominal pain, no chest pain, no fever, no headaches, no sputum production and no vomiting    Risk factors: tobacco use        Prior to Admission Medications   Prescriptions Last Dose Informant Patient Reported? Taking? Calcium-Magnesium-Vitamin D (CALCIUM 500 PO) 4/29/2023  Yes Yes   Sig: Take 1,000 mg by mouth daily   HYDROcodone-acetaminophen (NORCO) 5-325 mg per tablet 4/29/2023  Yes Yes   Sig: Take 1 tablet by mouth every 6 (six) hours as needed for pain   Zanubrutinib 80 MG CAPS 4/29/2023  Yes Yes   Sig: Take 160 mg by mouth 2 (two) times a day   albuterol (VENTOLIN HFA) 90 mcg/act inhaler 4/29/2023  No Yes   Sig: Inhale 2 puffs every 6 (six) hours as needed for wheezing   aspirin (ECOTRIN LOW STRENGTH) 81 mg EC tablet   No No   Sig: Take 1 tablet (81 mg total) by mouth daily   benzonatate (TESSALON PERLES) 100 mg capsule Not Taking  No No   Sig: Take 1 capsule (100 mg total) by mouth 3 (three) times a day as needed for cough   Patient not taking: Reported on 4/30/2023   buPROPion (WELLBUTRIN) 75 mg tablet Unknown  Yes No   Sig: Take 75 mg by mouth in the morning   budesonide-formoterol (SYMBICORT) 160-4 5 mcg/act inhaler 4/29/2023  No Yes   Sig: Inhale 2 puffs 2 (two) times a day Rinse mouth after use     carvedilol (COREG) " 25 mg tablet 4/29/2023  Yes Yes   Sig: Take 25 mg by mouth 2 (two) times a day with meals   enalapril (VASOTEC) 5 mg tablet 4/29/2023  No Yes   Sig: Take 1 tablet (5 mg total) by mouth 2 (two) times a day   Patient taking differently: Take 20 mg by mouth 2 (two) times a day   ferrous sulfate 325 (65 Fe) mg tablet Not Taking  Yes No   Sig: Take 325 mg by mouth daily with breakfast   Patient not taking: Reported on 4/30/2023   furosemide (LASIX) 20 mg tablet   No No   Sig: Take 1 tablet (20 mg total) by mouth daily   guaiFENesin (MUCINEX) 600 mg 12 hr tablet 4/29/2023  No Yes   Sig: Take 1 tablet (600 mg total) by mouth 2 (two) times a day   hydrochlorothiazide (HYDRODIURIL) 25 mg tablet 4/29/2023  No Yes   Sig: Take 1 tablet (25 mg total) by mouth daily   melatonin 3 mg Not Taking  No No   Sig: Take 1 tablet (3 mg total) by mouth daily at bedtime as needed (insomnia)   Patient not taking: Reported on 4/30/2023   nitroglycerin (NITROSTAT) 0 4 mg SL tablet Unknown  Yes No   Sig: Place 0 4 mg under the tongue every 5 (five) minutes as needed for chest pain   pravastatin (PRAVACHOL) 20 mg tablet   No No   Sig: Take 1 tablet (20 mg total) by mouth daily with dinner   Patient not taking: Reported on 3/31/2023   sodium chloride 0 9 % nebulizer solution Past Month  No Yes   Sig: Take 3 mL by nebulization 3 (three) times a day   vilazodone (VIIBRYD) 40 mg tablet 4/29/2023  Yes Yes   Sig: Take 40 mg by mouth daily with breakfast      Facility-Administered Medications: None       Past Medical History:   Diagnosis Date    Acute congestive heart failure (HCC) 8/27/2021    Anxiety     Cardiac disease     Chest pain 8/27/2021    Chronic pain disorder     [facility] disease[facility] )     Depression     Heart disease     Hyperlipidemia     Hypertension     MI (myocardial infarction) ([facility] )     MRSA (methicillin resistant Staphylococcus aureus)     Renal disorder     benign kidney tumor       Past Surgical History:   Procedure Laterality Date    APPENDECTOMY      ELBOW BURSA SURGERY Left 02/2018    D/T MRSA INFECTION    SPINAL FUSION      L7 L9       Family History   Problem Relation Age of Onset    Heart disease Mother     Cancer Father      I have reviewed and agree with the history as documented  E-Cigarette/Vaping    E-Cigarette Use Never User      E-Cigarette/Vaping Substances    Nicotine No     THC No     CBD No     Flavoring No     Other No     Unknown No      Social History     Tobacco Use    Smoking status: Every Day     Packs/day: 1 00     Years: 60 00     Pack years: 60 00     Types: Cigarettes    Smokeless tobacco: Never    Tobacco comments:     She has close to a 60 pack-year smoking history, most recently has cut down to 4-5 cigarettes daily   Vaping Use    Vaping Use: Never used   Substance Use Topics    Alcohol use: Never    Drug use: No       Review of Systems   Constitutional: Negative for fever  Respiratory: Positive for cough (dry), shortness of breath and wheezing  Negative for sputum production  Cardiovascular: Negative for chest pain  Gastrointestinal: Negative for abdominal pain and vomiting  Neurological: Negative for headaches  All other systems reviewed and are negative  Physical Exam  Physical Exam  Vitals and nursing note reviewed  Constitutional:       General: She is not in acute distress  Appearance: She is well-developed  She is ill-appearing  She is not diaphoretic  Comments: Kyphotic   HENT:      Head: Normocephalic and atraumatic  Nose: Nose normal    Eyes:      Conjunctiva/sclera: Conjunctivae normal    Cardiovascular:      Rate and Rhythm: Normal rate and regular rhythm  Heart sounds: Normal heart sounds  Pulmonary:      Effort: Pulmonary effort is normal  Tachypnea present  No respiratory distress  Breath sounds: Decreased breath sounds present  Abdominal:      Palpations: Abdomen is soft  Musculoskeletal:         General: Normal range of motion  Cervical back: Normal range of motion and neck supple  Right lower leg: No edema  Left lower leg: No edema  Skin:     General: Skin is warm and dry  Coloration: Skin is pale  Neurological:      General: No focal deficit present  Mental Status: She is alert and oriented to person, place, and time  Mental status is at baseline  Psychiatric:         Mood and Affect: Mood is anxious           Vital Signs  ED Triage Vitals   Temperature Pulse Respirations Blood Pressure SpO2   04/30/23 0917 04/30/23 0917 04/30/23 0917 04/30/23 0917 04/30/23 0917   97 8 °F (36 6 °C) 76 19 (!) 223/95 90 %      Temp Source Heart Rate Source Patient Position - Orthostatic VS BP Location FiO2 (%)   04/30/23 0917 04/30/23 0917 04/30/23 1030 04/30/23 1030 --   Oral Monitor Sitting Right arm       Pain Score       04/30/23 1024       7           Vitals:    05/01/23 0551 05/01/23 0745 05/01/23 1500 05/01/23 1847   BP: 155/93 161/93 141/77 121/63   Pulse: 78 87 97 88   Patient Position - Orthostatic VS:  Lying Lying Lying         Visual Acuity      ED Medications  Medications   aspirin (ECOTRIN LOW STRENGTH) EC tablet 81 mg (81 mg Oral Given 5/1/23 0820)   benzonatate (TESSALON PERLES) capsule 100 mg (100 mg Oral Not Given 5/1/23 0317)   budesonide-formoterol (SYMBICORT) 160-4 5 mcg/act inhaler 2 puff (2 puffs Inhalation Given 5/1/23 1722)   buPROPion (WELLBUTRIN) tablet 75 mg (75 mg Oral Given 5/1/23 0819)   carvedilol (COREG) tablet 25 mg (25 mg Oral Given 5/1/23 1651)   lisinopril (ZESTRIL) tablet 20 mg (20 mg Oral Given 5/1/23 1722)   ferrous sulfate tablet 325 mg (325 mg Oral Given 5/1/23 0820)   furosemide (LASIX) tablet 20 mg (20 mg Oral Given 5/1/23 0823)   guaiFENesin (MUCINEX) 12 hr tablet 600 mg (600 mg Oral Given 5/1/23 5598)   hydrochlorothiazide (HYDRODIURIL) tablet 25 mg (25 mg Oral Given 5/1/23 9959)   HYDROcodone-acetaminophen (Jose Manuel Armas) 5325 mg per tablet 1 tablet (1 tablet Oral Given 5/1/23 0951)   melatonin tablet 3 mg (has no administration in time range)   nitroglycerin (NITROSTAT) SL tablet 0 4 mg (has no administration in time range)   vilazodone (VIIBRYD) tablet 40 mg (40 mg Oral Given 5/1/23 0819)   nicotine (NICODERM CQ) 14 mg/24hr TD 24 hr patch 1 patch (1 patch Transdermal Medication Applied 5/1/23 0820)   acetaminophen (TYLENOL) tablet 650 mg (650 mg Oral Given 5/1/23 0550)   levalbuterol (XOPENEX) inhalation solution 1 25 mg (1 25 mg Nebulization Given 5/1/23 1958)   ipratropium (ATROVENT) 0 02 % inhalation solution 0 5 mg (0 5 mg Nebulization Given 5/1/23 1958)   ondansetron (ZOFRAN) injection 4 mg (4 mg Intravenous Given 5/1/23 0951)   hydrOXYzine HCL (ATARAX) tablet 25 mg (has no administration in time range)   predniSONE tablet 40 mg (40 mg Oral Not Given 5/1/23 1234)   hydrocortisone (ANUSOL-HC) 2 5 % rectal cream ( Topical Given 5/1/23 1722)   ipratropium-albuterol (DUO-NEB) 0 5-2 5 mg/3 mL inhalation solution 3 mL (3 mL Nebulization Given 4/30/23 1024)   fentanyl citrate (PF) 100 MCG/2ML 50 mcg (50 mcg Intravenous Given 4/30/23 1024)   magnesium sulfate 2 g/50 mL IVPB (premix) 2 g (0 g Intravenous Stopped 4/30/23 1238)   iohexol (OMNIPAQUE) 350 MG/ML injection (MULTI-DOSE) 80 mL (80 mL Intravenous Given 4/30/23 1137)   ipratropium-albuterol (DUO-NEB) 0 5-2 5 mg/3 mL inhalation solution 3 mL (3 mL Nebulization Given 4/30/23 1241)   methylPREDNISolone sodium succinate (Solu-MEDROL) injection 125 mg (125 mg Intravenous Given 4/30/23 1424)   polyethylene glycol (GOLYTELY) bowel prep 4,000 mL (4,000 mL Oral Given 5/1/23 1722)       Diagnostic Studies  Results Reviewed     Procedure Component Value Units Date/Time    Basic metabolic panel [[id number]]  (Abnormal) Collected: 05/01/23 0418    Lab Status: Final result Specimen: Blood Updated: 05/01/23 0433     Sodium 136 mmol/L      Potassium 3 6 mmol/L      Chloride 89 mmol/L      CO2 42 mmol/L      ANION GAP 5 mmol/L      BUN 15 mg/dL      Creatinine 0 65 mg/dL      Glucose 146 mg/dL      Calcium 9 7 mg/dL      eGFR 86 ml/min/1 73sq m     Narrative:      [place] guidelines for Chronic Kidney Disease (CKD):     Stage 1 with normal or high GFR (GFR > 90 mL/min/1 73 square meters)    Stage 2 Mild CKD (GFR = 60-89 mL/min/1 73 square meters)    Stage 3A Moderate CKD (GFR = 45-59 mL/min/1 73 square meters)    Stage 3B Moderate CKD (GFR = 30-44 mL/min/1 73 square meters)    Stage 4 Severe CKD (GFR = 15-29 mL/min/1 73 square meters)    Stage 5 End Stage CKD (GFR <15 mL/min/1 73 square meters)  Note: GFR calculation is accurate only with a steady state creatinine    CBC (With Platelets) [[id number]]  (Abnormal) Collected: 05/01/23 0417    Lab Status: Final result Specimen: Blood Updated: 05/01/23 0418     WBC 10 69 Thousand/uL      RBC 3 56 Million/uL      Hemoglobin 10 6 g/dL      Hematocrit 33 1 %      MCV 93 fL      MCH 29 8 pg      MCHC 32 0 g/dL      RDW 15 4 %      Platelets 668 Thousands/uL      MPV 9 7 fL     Blood culture #1 [[id number]] Collected: 04/30/23 0943    Lab Status: Preliminary result Specimen: Blood from Arm, Right Updated: 05/01/23 0101     Blood Culture Received in Microbiology Lab  Culture in Progress  Blood culture #2 [[id number]] Collected: 04/30/23 0940    Lab Status: Preliminary result Specimen: Blood from Arm, Left Updated: 05/01/23 0101     Blood Culture Received in Microbiology Lab  Culture in Progress      HS Troponin I 2hr [[id number]]  (Normal) Collected: 04/30/23 1244    Lab Status: Final result Specimen: Blood from Arm, Left Updated: 04/30/23 1310     hs TnI 2hr 6 ng/L      Delta 2hr hsTnI 1 ng/L     FLU/RSV/COVID - if FLU/RSV clinically relevant [[id number]]  (Normal) Collected: 04/30/23 0939    Lab Status: Final result Specimen: Nares from Nose Updated: 04/30/23 1036     SARS-CoV-2 Negative     INFLUENZA A PCR Negative     INFLUENZA B PCR Negative     RSV PCR Negative    Narrative:      FOR PEDIATRIC PATIENTS - copy/paste COVID Guidelines URL to browser: [email or web address]    SARS-CoV-2 assay is a Nucleic Acid Amplification assay intended for the  qualitative detection of nucleic acid from SARS-CoV-2 in nasopharyngeal  swabs  Results are for the presumptive identification of SARS-CoV-2 RNA  Positive results are indicative of infection with SARS-CoV-2, the virus  causing COVID-19, but do not rule out bacterial infection or co-infection  with other viruses  Laboratories within the [place] and its  territories are required to report all positive results to the appropriate  public health authorities  Negative results do not preclude SARS-CoV-2  infection and should not be used as the sole basis for treatment or other  patient management decisions  Negative results must be combined with  clinical observations, patient history, and epidemiological information  This test has not been FDA cleared or approved  This test has been authorized by FDA under an Emergency Use Authorization  (EUA)  This test is only authorized for the duration of time the  declaration that circumstances exist justifying the authorization of the  emergency use of an in vitro diagnostic tests for detection of SARS-CoV-2  virus and/or diagnosis of COVID-19 infection under section 564(b)(1) of  the Act, 21 U  S C  102ILH-2(M)(6), unless the authorization is terminated  or revoked sooner  The test has been validated but independent review by FDA  and CLIA is pending  Test performed using [employer] Gene[employer]pert: This RT-PCR assay targets N2,  a region unique to SARS-CoV-2  A conserved region in the E-gene was chosen  for pan-Sarbecovirus detection which includes SARS-CoV-2  According to CMS-2020-01-R, this platform meets the definition of high-throughput technology      TSH [[id number]]  (Abnormal) Collected: 04/30/23 7389 Lab Status: Final result Specimen: Blood from Arm, Left Updated: 04/30/23 1034     TSH 3RD GENERATON 0 421 uIU/mL     B-Type Natriuretic Peptide(BNP) [[id number]]  (Normal) Collected: 04/30/23 0939    Lab Status: Final result Specimen: Blood from Arm, Left Updated: 04/30/23 1023     BNP 46 pg/mL     HS Troponin 0hr (reflex protocol) [[id number]]  (Normal) Collected: 04/30/23 0939    Lab Status: Final result Specimen: Blood from Arm, Left Updated: 04/30/23 1020     hs TnI 0hr 5 ng/L     Comprehensive metabolic panel [[id number]]  (Abnormal) Collected: 04/30/23 0939    Lab Status: Final result Specimen: Blood from Arm, Left Updated: 04/30/23 1010     Sodium 134 mmol/L      Potassium 4 2 mmol/L      Chloride 88 mmol/L      CO2 44 mmol/L      ANION GAP 2 mmol/L      BUN 18 mg/dL      Creatinine 0 64 mg/dL      Glucose 121 mg/dL      Calcium 10 0 mg/dL      AST 16 U/L      ALT 7 U/L      Alkaline Phosphatase 74 U/L      Total Protein 7 3 g/dL      Albumin 3 7 g/dL      Total Bilirubin 0 25 mg/dL      eGFR 86 ml/min/1 73sq m     Narrative:      [place] guidelines for Chronic Kidney Disease (CKD):     Stage 1 with normal or high GFR (GFR > 90 mL/min/1 73 square meters)    Stage 2 Mild CKD (GFR = 60-89 mL/min/1 73 square meters)    Stage 3A Moderate CKD (GFR = 45-59 mL/min/1 73 square meters)    Stage 3B Moderate CKD (GFR = 30-44 mL/min/1 73 square meters)    Stage 4 Severe CKD (GFR = 15-29 mL/min/1 73 square meters)    Stage 5 End Stage CKD (GFR <15 mL/min/1 73 square meters)  Note: GFR calculation is accurate only with a steady state creatinine    Magnesium [[id number]]  (Abnormal) Collected: 04/30/23 0939    Lab Status: Final result Specimen: Blood from Arm, Left Updated: 04/30/23 1010     Magnesium 1 4 mg/dL     Protime-INR [[id number]]  (Normal) Collected: 04/30/23 0939    Lab Status: Final result Specimen: Blood from Arm, Left Updated: 04/30/23 1008     Protime 13 4 seconds      INR 1 04 APTT [[id number]]  (Normal) Collected: 04/30/23 0939    Lab Status: Final result Specimen: Blood from Arm, Left Updated: 04/30/23 1008     PTT 30 seconds     Lactic acid, plasma (w/reflex if result > 2 0) [[id number]]  (Normal) Collected: 04/30/23 0939    Lab Status: Final result Specimen: Blood from Arm, Left Updated: 04/30/23 1008     LACTIC ACID 0 6 mmol/L     Narrative:      Result may be elevated if tourniquet was used during collection      Urine Microscopic [[id number]]  (Abnormal) Collected: 04/30/23 0944    Lab Status: Final result Specimen: Urine, Clean Catch Updated: 04/30/23 0958     RBC, UA 2-4 /hpf      WBC, UA 1-2 /hpf      Epithelial Cells Occasional /hpf      Bacteria, UA None Seen /hpf     UA w Reflex to Microscopic w Reflex to Culture [[id number]]  (Abnormal) Collected: 04/30/23 0944    Lab Status: Final result Specimen: Urine, Clean Catch Updated: 04/30/23 0957     Color, UA Light Yellow     Clarity, UA Clear     Specific Gravity, UA 1 021     pH, UA 5 5     Leukocytes, UA Negative     Nitrite, UA Negative     Protein, UA Trace mg/dl      Glucose, UA Negative mg/dl      Ketones, UA Negative mg/dl      Urobilinogen, UA <2 0 mg/dl      Bilirubin, UA Negative     Occult Blood, UA Negative    CBC and differential [[id number]]  (Abnormal) Collected: 04/30/23 0939    Lab Status: Final result Specimen: Blood from Arm, Left Updated: 04/30/23 0955     WBC 16 25 Thousand/uL      RBC 3 48 Million/uL      Hemoglobin 10 3 g/dL      Hematocrit 33 3 %      MCV 96 fL      MCH 29 6 pg      MCHC 30 9 g/dL      RDW 15 1 %      MPV 10 1 fL      Platelets 974 Thousands/uL      nRBC 0 /100 WBCs      Neutrophils Relative 59 %      Immat GRANS % 0 %      Lymphocytes Relative 34 %      Monocytes Relative 7 %      Eosinophils Relative 0 %      Basophils Relative 0 %      Neutrophils Absolute 9 41 Thousands/µL      Immature Grans Absolute 0 06 Thousand/uL      Lymphocytes Absolute 5 54 Thousands/µL      Monocytes Absolute 1 16 Thousand/µL      Eosinophils Absolute 0 05 Thousand/µL      Basophils Absolute 0 03 Thousands/µL                  PE Study with CT Abdomen and Pelvis with contrast   Final Result by Yeimy Santos MD (04/30 6842)      1  No pulmonary embolus  2   Emphysema  3   Chronic bronchitis  4   Tree-in-bud opacities throughout both lungs, most likely due to bronchiolitis  5   No evidence of acute abnormality in the abdomen or pelvis  Workstation performed: [id number]         XR chest 1 view portable   ED Interpretation by Avila Olvera MD (04/30 1031)   NAD      Final Result by Joselo Osei MD (05/01 8579)      No radiographic evidence of acute intrathoracic process  Workstation performed: [id number]                    Procedures  ECG 12 Lead Documentation Only    Date/Time: 4/30/2023 9:41 AM  Performed by: Avila Olvera MD  Authorized by: Avila Olvera MD     Indications / Diagnosis:  Dyspnea  Rate:     ECG rate:  80    ECG rate assessment: normal    Rhythm:     Rhythm: sinus rhythm    ST segments:     ST segments:  Normal  T waves:     T waves: normal               ED Course                               SBIRT 20yo+    Flowsheet Row Most Recent Value   Initial Alcohol Screen: US AUDIT-C     1  How often do you have a drink containing alcohol? 0 Filed at: 04/30/2023 0919   2  How many drinks containing alcohol do you have on a typical day you are drinking? 0 Filed at: 04/30/2023 0919   3b  FEMALE Any Age, or MALE 65+: How often do you have 4 or more drinks on one occassion? 0 Filed at: 04/30/2023 0919   Audit-C Score 0 Filed at: 04/30/2023 0007   NIA: How many times in the past year have you    Used an illegal drug or used a prescription medication for non-medical reasons?  Never Filed at: 04/30/2023 1840                    Medical Decision Making  69-year-old female brought in from rehab facility for acute on chronic shortness of breath  Patient has history of COPD and has had issues with depression and anxiety and got more short of breath in the past 2 days and worse right before she came in  No reported fevers, vomiting or diarrhea  Denies any abdominal pain or chest pain  Complains of just feeling unwell and fatigued ever since she reportedly got an iron infusion 3 weeks ago  However the son states that she has been very on well mentally since he found that she was living alone and was doing her own pills and stopped taking her mental health medication and she decompensated and she has been significantly depressed and anxious since then which is now going on a few months to weeks  She was restarted on those medications about 3 to 4 weeks ago but has not seem to improve  Patient a few months ago was living independently  Now she is currently in a rehab facility and she frequently gets short of breath and anxious but the rehab facilities in [place] and per their policies with COVID they do not do any nebulizer treatments in the building at all, [place] just lifted all restrictions but they have not gone back to doing any nebulizers, so she only gets inhalers for her breathing treatment which are often inadequate to help her when she gets severely short of breath or anxious which has now led her to have frequent hospitalizations over the past month since she has been at this rehab facility  She has not had any fever in the past day  She does have history of chronic leukemia, which would explain her mildly elevated white count today  We will get labs and chest x-ray to evaluate for shortness of breath rule out pneumonia and pneumothorax  We will get EKG for arrhythmia and ischemic EKG changes  We will get labs for CBC for white count and hemoglobin and platelets  Has history of anemia we will make sure not anemic contributing to her dyspnea  Has history of CHF so we will also get BNP    Will evaluate for ACS with troponins  Given her frequent hospitalizations we will also make sure we rule out infectious source for dyspnea with recurrent pneumonia and viral source  We will also rule out PE as a potential trigger  We will give her nebs and steroids and reassess how she is feeling if patient is not improved given that her facility, does not seem to be adequately able to treat her decompensated COPD because they do not give nebulizers may need to admit her, discussed with son whether or not alternative rehab or discussing with the facility about when they are removing the no nebulizer policy is going to change  COPD with acute exacerbation [facility]): acute illness or injury  Amount and/or Complexity of Data Reviewed  Independent Historian: guardian     Details: Son  Labs: ordered  Radiology: ordered and independent interpretation performed  Details: No acute evidence of infection on CAT scan  Mildly elevated white blood cell count but patient has history of chronic leukemia, no elevated lactate  No fever  Would not treat this is an infection at this time  We will treat as COPD exacerbation  Likely also triggered by inability to get neb treatments at her current facility  ECG/medicine tests: ordered and independent interpretation performed  Risk  Prescription drug management  Decision regarding hospitalization            Disposition  Final diagnoses:   COPD with acute exacerbation ([facility] )     Time reflects when diagnosis was documented in both MDM as applicable and the Disposition within this note     Time User Action Codes Description Comment    4/30/2023  2:22 PM David GARCES Add [J44 1] COPD with acute exacerbation ([facility] )     5/1/2023  7:57 AM Lorenza Olivo Add [K62 5] BRBPR (bright red blood per rectum)     5/1/2023  2:01 PM Elena Kelly Add [D64 9] Anemia, unspecified type       ED Disposition     ED Disposition   Admit    Condition   Stable    Date/Time   Sun Apr 30, 2023  2:22 PM Comment   --         MD Documentation    Flowsheet Row Most Recent Value   Accepting Facility Name, [address] [place] [address]      RN Documentation    Flowsheet Row Most [address] Name, [address] [place] [address]      Follow-up Information    None         Current Discharge Medication List      CONTINUE these medications which have NOT CHANGED    Details   albuterol (VENTOLIN HFA) 90 mcg/act inhaler Inhale 2 puffs every 6 (six) hours as needed for wheezing  Qty: 1 Inhaler, Refills: 0    Comments: Substitution to a formulary equivalent within the same pharmaceutical class is authorized  Associated Diagnoses: COPD exacerbation (HCC)      budesonide-formoterol (SYMBICORT) 160-4 5 mcg/act inhaler Inhale 2 puffs 2 (two) times a day Rinse mouth after use    Qty: 1 Inhaler, Refills: 0    Associated Diagnoses: COPD exacerbation (HCC)      Calcium-Magnesium-Vitamin D (CALCIUM 500 PO) Take 1,000 mg by mouth daily      carvedilol (COREG) 25 mg tablet Take 25 mg by mouth 2 (two) times a day with meals      enalapril (VASOTEC) 5 mg tablet Take 1 tablet (5 mg total) by mouth 2 (two) times a day  Qty: 60 tablet, Refills: 1    Associated Diagnoses: Essential hypertension      guaiFENesin (MUCINEX) 600 mg 12 hr tablet Take 1 tablet (600 mg total) by mouth 2 (two) times a day  Qty: 60 tablet, Refills: 0    Associated Diagnoses: COPD exacerbation (HCC)      hydrochlorothiazide (HYDRODIURIL) 25 mg tablet Take 1 tablet (25 mg total) by mouth daily  Qty: 30 tablet, Refills: 0    Associated Diagnoses: Essential hypertension      HYDROcodone-acetaminophen (NORCO) 5-325 mg per tablet Take 1 tablet by mouth every 6 (six) hours as needed for pain      sodium chloride 0 9 % nebulizer solution Take 3 mL by nebulization 3 (three) times a day  Refills: 0    Associated Diagnoses: COPD exacerbation (HCC)      vilazodone (VIIBRYD) 40 mg tablet Take 40 mg by mouth daily with breakfast Zanubrutinib 80 MG CAPS Take 160 mg by mouth 2 (two) times a day      aspirin (ECOTRIN LOW STRENGTH) 81 mg EC tablet Take 1 tablet (81 mg total) by mouth daily  Qty: 30 tablet, Refills: 0    Associated Diagnoses: COPD (chronic obstructive pulmonary disease) ([facility])      benzonatate (TESSALON PERLES) 100 mg capsule Take 1 capsule (100 mg total) by mouth 3 (three) times a day as needed for cough  Qty: 20 capsule, Refills: 0    Associated Diagnoses: COPD exacerbation ([facility])      buPROPion (WELLBUTRIN) 75 mg tablet Take 75 mg by mouth in the morning      ferrous sulfate 325 (65 Fe) mg tablet Take 325 mg by mouth daily with breakfast      furosemide (LASIX) 20 mg tablet Take 1 tablet (20 mg total) by mouth daily  Qty: 30 tablet, Refills: 0    Associated Diagnoses: COPD (chronic obstructive pulmonary disease) ([facility])      melatonin 3 mg Take 1 tablet (3 mg total) by mouth daily at bedtime as needed (insomnia)  Qty: 30 tablet, Refills: 0    Associated Diagnoses: Major depressive disorder, recurrent episode, moderate ([facility])      nitroglycerin (NITROSTAT) 0 4 mg SL tablet Place 0 4 mg under the tongue every 5 (five) minutes as needed for chest pain      pravastatin (PRAVACHOL) 20 mg tablet Take 1 tablet (20 mg total) by mouth daily with dinner  Qty: 30 tablet, Refills: 0    Associated Diagnoses: COPD (chronic obstructive pulmonary disease) ([facility])             No discharge procedures on file      PDMP Review       Value Time User    PDMP Reviewed  Yes 8/27/2021  3:13 AM Abel Del Rio PA-C          ED Provider  Electronically Signed by           Faizan Duque MD  05/01/23 8782

## 2023-05-01 PROBLEM — K62.5 BRBPR (BRIGHT RED BLOOD PER RECTUM): Status: ACTIVE | Noted: 2023-05-01

## 2023-05-01 LAB
ANION GAP SERPL CALCULATED.3IONS-SCNC: 5 MMOL/L (ref 4–13)
BUN SERPL-MCNC: 15 MG/DL (ref 5–25)
CALCIUM SERPL-MCNC: 9.7 MG/DL (ref 8.4–10.2)
CHLORIDE SERPL-SCNC: 89 MMOL/L (ref 96–108)
CO2 SERPL-SCNC: 42 MMOL/L (ref 21–32)
CREAT SERPL-MCNC: 0.65 MG/DL (ref 0.6–1.3)
ERYTHROCYTE [DISTWIDTH] IN BLOOD BY AUTOMATED COUNT: 15.4 % (ref 11.6–15.1)
GFR SERPL CREATININE-BSD FRML MDRD: 86 ML/MIN/1.73SQ M
GLUCOSE SERPL-MCNC: 146 MG/DL (ref 65–140)
HCT VFR BLD AUTO: 33.1 % (ref 34.8–46.1)
HGB BLD-MCNC: 10.6 G/DL (ref 11.5–15.4)
LACTATE SERPL-SCNC: 0.7 MMOL/L (ref 0.5–2)
LACTATE SERPL-SCNC: 2.7 MMOL/L (ref 0.5–2)
MCH RBC QN AUTO: 29.8 PG (ref 26.8–34.3)
MCHC RBC AUTO-ENTMCNC: 32 G/DL (ref 31.4–37.4)
MCV RBC AUTO: 93 FL (ref 82–98)
PLATELET # BLD AUTO: 200 THOUSANDS/UL (ref 149–390)
PMV BLD AUTO: 9.7 FL (ref 8.9–12.7)
POTASSIUM SERPL-SCNC: 3.6 MMOL/L (ref 3.5–5.3)
PROCALCITONIN SERPL-MCNC: <0.05 NG/ML
RBC # BLD AUTO: 3.56 MILLION/UL (ref 3.81–5.12)
SODIUM SERPL-SCNC: 136 MMOL/L (ref 135–147)
WBC # BLD AUTO: 10.69 THOUSAND/UL (ref 4.31–10.16)

## 2023-05-01 RX ORDER — SODIUM CHLORIDE 9 MG/ML
125 INJECTION, SOLUTION INTRAVENOUS CONTINUOUS
Status: DISCONTINUED | OUTPATIENT
Start: 2023-05-01 | End: 2023-05-01

## 2023-05-01 RX ORDER — PREDNISONE 20 MG/1
40 TABLET ORAL DAILY
Status: DISCONTINUED | OUTPATIENT
Start: 2023-05-01 | End: 2023-05-05 | Stop reason: HOSPADM

## 2023-05-01 RX ORDER — HYDROXYZINE HYDROCHLORIDE 25 MG/1
25 TABLET, FILM COATED ORAL EVERY 6 HOURS PRN
Status: DISCONTINUED | OUTPATIENT
Start: 2023-05-01 | End: 2023-05-05 | Stop reason: HOSPADM

## 2023-05-01 RX ORDER — ONDANSETRON 2 MG/ML
4 INJECTION INTRAMUSCULAR; INTRAVENOUS EVERY 6 HOURS PRN
Status: DISCONTINUED | OUTPATIENT
Start: 2023-05-01 | End: 2023-05-05 | Stop reason: HOSPADM

## 2023-05-01 RX ORDER — HYDROCORTISONE 25 MG/G
CREAM TOPICAL 2 TIMES DAILY
Status: DISCONTINUED | OUTPATIENT
Start: 2023-05-01 | End: 2023-05-05 | Stop reason: HOSPADM

## 2023-05-01 RX ADMIN — LEVALBUTEROL HYDROCHLORIDE 1.25 MG: 1.25 SOLUTION, CONCENTRATE RESPIRATORY (INHALATION) at 19:58

## 2023-05-01 RX ADMIN — FUROSEMIDE 20 MG: 20 TABLET ORAL at 08:23

## 2023-05-01 RX ADMIN — BUPROPION HYDROCHLORIDE TABLETS 75 MG: 75 TABLET, FILM COATED ORAL at 08:19

## 2023-05-01 RX ADMIN — HYDROCODONE BITARTRATE AND ACETAMINOPHEN 1 TABLET: 5; 325 TABLET ORAL at 09:51

## 2023-05-01 RX ADMIN — ACETAMINOPHEN 650 MG: 325 TABLET ORAL at 05:50

## 2023-05-01 RX ADMIN — HYDROXYZINE HYDROCHLORIDE 25 MG: 25 TABLET ORAL at 23:51

## 2023-05-01 RX ADMIN — ASPIRIN 81 MG: 81 TABLET, COATED ORAL at 08:20

## 2023-05-01 RX ADMIN — METHYLPREDNISOLONE SODIUM SUCCINATE 40 MG: 40 INJECTION, POWDER, FOR SOLUTION INTRAMUSCULAR; INTRAVENOUS at 08:19

## 2023-05-01 RX ADMIN — GUAIFENESIN 600 MG: 600 TABLET ORAL at 17:22

## 2023-05-01 RX ADMIN — FERROUS SULFATE TAB 325 MG (65 MG ELEMENTAL FE) 325 MG: 325 (65 FE) TAB at 08:20

## 2023-05-01 RX ADMIN — IPRATROPIUM BROMIDE 0.5 MG: 0.5 SOLUTION RESPIRATORY (INHALATION) at 13:15

## 2023-05-01 RX ADMIN — LEVALBUTEROL HYDROCHLORIDE 1.25 MG: 1.25 SOLUTION, CONCENTRATE RESPIRATORY (INHALATION) at 07:15

## 2023-05-01 RX ADMIN — IPRATROPIUM BROMIDE 0.5 MG: 0.5 SOLUTION RESPIRATORY (INHALATION) at 19:58

## 2023-05-01 RX ADMIN — HYDROCHLOROTHIAZIDE 25 MG: 25 TABLET ORAL at 08:20

## 2023-05-01 RX ADMIN — BUDESONIDE AND FORMOTEROL FUMARATE DIHYDRATE 2 PUFF: 160; 4.5 AEROSOL RESPIRATORY (INHALATION) at 08:22

## 2023-05-01 RX ADMIN — ONDANSETRON 4 MG: 2 INJECTION INTRAMUSCULAR; INTRAVENOUS at 09:51

## 2023-05-01 RX ADMIN — CARVEDILOL 25 MG: 12.5 TABLET, FILM COATED ORAL at 16:51

## 2023-05-01 RX ADMIN — LISINOPRIL 20 MG: 20 TABLET ORAL at 08:20

## 2023-05-01 RX ADMIN — LEVALBUTEROL HYDROCHLORIDE 1.25 MG: 1.25 SOLUTION, CONCENTRATE RESPIRATORY (INHALATION) at 13:15

## 2023-05-01 RX ADMIN — GUAIFENESIN 600 MG: 600 TABLET ORAL at 08:20

## 2023-05-01 RX ADMIN — POLYETHYLENE GLYCOL 3350, SODIUM SULFATE ANHYDROUS, SODIUM BICARBONATE, SODIUM CHLORIDE, POTASSIUM CHLORIDE 4000 ML: 236; 22.74; 6.74; 5.86; 2.97 POWDER, FOR SOLUTION ORAL at 17:22

## 2023-05-01 RX ADMIN — CARVEDILOL 25 MG: 12.5 TABLET, FILM COATED ORAL at 08:19

## 2023-05-01 RX ADMIN — SODIUM CHLORIDE 125 ML/HR: 0.9 INJECTION, SOLUTION INTRAVENOUS at 03:57

## 2023-05-01 RX ADMIN — LISINOPRIL 20 MG: 20 TABLET ORAL at 17:22

## 2023-05-01 RX ADMIN — VILAZODONE HYDROCHLORIDE 40 MG: 20 TABLET ORAL at 08:19

## 2023-05-01 RX ADMIN — NICOTINE 1 PATCH: 14 PATCH, EXTENDED RELEASE TRANSDERMAL at 08:20

## 2023-05-01 RX ADMIN — BUDESONIDE AND FORMOTEROL FUMARATE DIHYDRATE 2 PUFF: 160; 4.5 AEROSOL RESPIRATORY (INHALATION) at 17:22

## 2023-05-01 RX ADMIN — IPRATROPIUM BROMIDE 0.5 MG: 0.5 SOLUTION RESPIRATORY (INHALATION) at 07:15

## 2023-05-01 RX ADMIN — HYDROCORTISONE: 25 CREAM TOPICAL at 17:22

## 2023-05-01 NOTE — CONSULTS
Consultation - [facility]  Dar Barbosa 69 y o  female MRN: [id number]  Unit/Bed#: -01 Encounter: [id number]         Reason for Consult / Principal Problem: Rectal bleeding    HPI: Devin Donaldson is a 20-year-old female with history of COPD on supplemental oxygen according to the patient between 3 to 4 L of oxygen, CAD, tobacco dependence and hypertension  Patient was initially admitted over the weekend for shortness of breath  Patient reports that she has been in and out of the hospital with COPD exacerbations and pneumonia  GI was consulted for acute onset of rectal bleeding  Patient reports that she was diagnosed with IBS at age 17  Patient has history of alternating bowel habits, but reports that she normally does not look at her stool color  This morning, it was noted that the patient had 2 episodes of diarrhea with bright red bleeding  Patient reports this was painless bleeding in the bedpan  CTA performed admission revealing no pulmonary embolism, known emphysema and bronchiolitis  No evidence of acute abnormality in the abdomen or pelvis  Lactic acid normal on admission  Hemoglobin trending in the 10s  BUN 15  Patient reports that her last colonoscopy was 5 years ago, and she was told that she had a precancerous polyp  There are no family members with history of colon cancer to her knowledge  Patient reports that she cannot travel back as an outpatient for a colonoscopy since she lives by herself and would be fearful with prepping at home  Review of Systems:    CONSTITUTIONAL: Denies any fever, chills, or rigors  HEENT: No earache or tinnitus  Denies hearing loss or visual disturbances  CARDIOVASCULAR: No chest pain or palpitations  RESPIRATORY: Denies any cough, hemoptysis, shortness of breath or dyspnea on exertion  GASTROINTESTINAL: As noted in the History of Present Illness  GENITOURINARY: No problems with urination   Denies any hematuria or dysuria  NEUROLOGIC: No dizziness or vertigo, denies headaches  MUSCULOSKELETAL: Denies any muscle or joint pain  SKIN: Denies skin rashes or itching  ENDOCRINE: Denies excessive thirst  Denies intolerance to heat or cold  PSYCHOSOCIAL: Denies depression or anxiety  Denies any recent memory loss         Historical Information   Past Medical History:   Diagnosis Date    Acute congestive heart failure ([place] [address] ) 8/27/2021    Anxiety     Cardiac disease     Chest pain 8/27/2021    Chronic pain disorder     [facility])     Depression     Heart disease     Hyperlipidemia     Hypertension     MI (myocardial infarction) ([place] [address] )     MRSA (methicillin resistant Staphylococcus aureus)     Renal disorder     benign kidney tumor     Past Surgical History:   Procedure Laterality Date    APPENDECTOMY      ELBOW BURSA SURGERY Left 02/2018    D/T MRSA INFECTION    SPINAL FUSION      L7 L9     Social History   Social History     Substance and Sexual Activity   Alcohol Use Never     Social History     Substance and Sexual Activity   Drug Use No     Social History     Tobacco Use   Smoking Status Every Day    Packs/day: 1 00    Years: 60 00    Pack years: 60 00    Types: Cigarettes   Smokeless Tobacco Never   Tobacco Comments    She has close to a 60 pack-year smoking history, most recently has cut down to 4-5 cigarettes daily     Family History   Problem Relation Age of Onset    Heart disease Mother     Cancer Father         Meds/Allergies     Current Facility-Administered Medications   Medication Dose Route Frequency    acetaminophen (TYLENOL) tablet 650 mg  650 mg Oral Q6H PRN    aspirin (ECOTRIN LOW STRENGTH) EC tablet 81 mg  81 mg Oral Daily    benzonatate (TESSALON PERLES) capsule 100 mg  100 mg Oral TID PRN    budesonide-formoterol (SYMBICORT) 160-4 5 mcg/act inhaler 2 puff  2 puff Inhalation BID    buPROPion (WELLBUTRIN) tablet 75 mg  75 mg Oral Daily    "carvedilol (COREG) tablet 25 mg  25 mg Oral BID With Meals    ferrous sulfate tablet 325 mg  325 mg Oral Daily With Breakfast    furosemide (LASIX) tablet 20 mg  20 mg Oral Daily    guaiFENesin (MUCINEX) 12 hr tablet 600 mg  600 mg Oral BID    hydrochlorothiazide (HYDRODIURIL) tablet 25 mg  25 mg Oral Daily    HYDROcodone-acetaminophen (NORCO) 5-325 mg per tablet 1 tablet  1 tablet Oral Q6H PRN    hydrOXYzine HCL (ATARAX) tablet 25 mg  25 mg Oral Q6H PRN    ipratropium (ATROVENT) 0 02 % inhalation solution 0 5 mg  0 5 mg Nebulization TID    levalbuterol (XOPENEX) inhalation solution 1 25 mg  1 25 mg Nebulization TID    lisinopril (ZESTRIL) tablet 20 mg  20 mg Oral BID    melatonin tablet 3 mg  3 mg Oral HS PRN    nicotine (NICODERM CQ) 14 mg/24hr TD 24 hr patch 1 patch  1 patch Transdermal Daily    nitroglycerin (NITROSTAT) SL tablet 0 4 mg  0 4 mg Sublingual Q5 Min PRN    ondansetron (ZOFRAN) injection 4 mg  4 mg Intravenous Q6H PRN    predniSONE tablet 40 mg  40 mg Oral Daily    vilazodone (VIIBRYD) tablet 40 mg  40 mg Oral Daily With Breakfast       Allergies   Allergen Reactions    Sulfa Antibiotics Anaphylaxis    Niacin     Statins Other (See Comments)     cramps         Objective     Blood pressure 161/93, pulse 87, temperature 97 7 °F (36 5 °C), temperature source Oral, resp  rate 16, height 5' 7\" (1 702 m), weight 46 8 kg (103 lb 2 8 oz), SpO2 91 %, not currently breastfeeding  Intake/Output Summary (Last 24 hours) at 5/1/2023 1240  Last data filed at 5/1/2023 0357  Gross per 24 hour   Intake --   Output 750 ml   Net -750 ml         PHYSICAL EXAM:      General Appearance:   Alert and oriented x 3   Cooperative, and in no respiratory distress   HEENT:   Normocephalic, atraumatic, anicteric      Neck:  Supple, symmetrical, trachea midline   Lungs:   Clear to auscultation bilaterally; no rales, rhonchi or wheezing; respirations unlabored    Heart[de-identified]   S1 and S2 normal; regular rate and " rhythm; no murmur, rub, or gallop  Abdomen:   Soft, non-tender, non-distended; normal bowel sounds; no masses, no organomegaly    Genitalia:   Deferred    Rectal:   Deferred    Extremities:  No cyanosis, clubbing or edema    Pulses:  2+ and symmetric all extremities    Skin:  Skin color, texture, turgor normal, no rashes or lesions    Lymph nodes:  No palpable cervical or supraclavicular lymphadenopathy        Lab Results:   Results from last 7 days   Lab Units 05/01/23 0417 04/30/23  0939   WBC Thousand/uL 10 69* 16 25*   HEMOGLOBIN g/dL 10 6* 10 3*   HEMATOCRIT % 33 1* 33 3*   PLATELETS Thousands/uL 200 192   NEUTROS PCT %  --  59   LYMPHS PCT %  --  34   MONOS PCT %  --  7   EOS PCT %  --  0     Results from last 7 days   Lab Units 05/01/23 0417 04/30/23  0939   POTASSIUM mmol/L 3 6 4 2   CHLORIDE mmol/L 89* 88*   CO2 mmol/L 42* 44*   BUN mg/dL 15 18   CREATININE mg/dL 0 65 0 64   CALCIUM mg/dL 9 7 10 0   ALK PHOS U/L  --  74   ALT U/L  --  7   AST U/L  --  16     Results from last 7 days   Lab Units 04/30/23  0939   INR  1 04           Imaging Studies: I have personally reviewed pertinent imaging studies  XR chest 1 view portable    Result Date: 5/1/2023  Impression: No radiographic evidence of acute intrathoracic process  Workstation performed: [id number]     PE Study with CT Abdomen and Pelvis with contrast    Result Date: 4/30/2023  Impression: 1  No pulmonary embolus  2   Emphysema  3   Chronic bronchitis  4   Tree-in-bud opacities throughout both lungs, most likely due to bronchiolitis  5   No evidence of acute abnormality in the abdomen or pelvis  Workstation performed: [id number]       ASSESSMENT and PLAN:      1) Rectal bleeding, history of precancerous colon polyps - Patient's hemoglobin stable despite two episodes of large rectal bleeding  CT showing no obvious bowel wall thickening   Bleeding may be hemorrhoidal, colon polyp, AVM, diverticular, less likely ischemic colitis, etc    - Spoke with internal medicine team, we will plan for colonoscopy during this admission given patient has no way to come back as an outpatient for follow-up  O2 requirements are at baseline     - Anusol rectal cream BID  - Recheck lactic acid   - Continue to monitor hemoglobin closely and transfuse as neccessary with a goal hemoglobin above 7-8  - Please notify GI if patient has brisk GI bleeding for consideration of sooner evaluation    The patient was seen and examined by Dr Ashok Lynch, all portillo medical decisions were made with Dr Ashok Lynch  Thank you for allowing us to participate in the care of this pleasant patient  We will follow up with you closely

## 2023-05-01 NOTE — CASE MANAGEMENT
Case Management Discharge Planning Note    Patient name Ann Martins  Location [address] 210/-03 MRN [id number]  : 1945 Date 2023       Current Admission Date: 2023  Current Admission Diagnosis:Chronic obstructive pulmonary disease with acute exacerbation [facility])   Patient Active Problem List    Diagnosis Date Noted    BRBPR (bright red blood per rectum) 2023    Generalized weakness 2023    Staring episodes 2023    Waldenstrom macroglobulinemia ([facility] ) 2023    Severe protein-calorie malnutrition ([facility] ) 2021    Positive blood culture 2021    Chronic obstructive pulmonary disease with acute exacerbation ([facility] ) 2021    SIRS (systemic inflammatory response syndrome) ([facility] ) 2021    Anemia 2021    Abnormal computed tomography angiography (CTA) 2021    Fatigue 2021    Major depressive disorder, recurrent episode, moderate ([address] ) 2019    Flank pain 2019    Pneumonia 2019    Sepsis ([address] ) 2019    CAD (coronary artery disease), native coronary artery 2018    Chronic respiratory failure with hypoxia ([facility] ) 2018    Ambulatory dysfunction 2018    Hypokalemia 2018    Essential hypertension 2018    Tobacco abuse 2018      LOS (days): 0  Geometric Mean LOS (GMLOS) (days):   Days to GMLOS:     OBJECTIVE:            Current admission status: Observation   Preferred Pharmacy:   [facility] [address] [place] [address] [address]  Phone: [phone] Fax: [phone], [address] [facility] [address]  [address] [facility] [address] [address]  Phone: [phone] Fax: [phone]    Primary Care Provider: Ian Billings MD    Primary Insurance: MEDICARE  Secondary Insurance:     DISCHARGE DETAILS:    Other Referral/Resources/Interventions Provided:  Interventions: Short Term Rehab  Referral Comments: CM reviewed [facility] and found that [facility] is holding patient's bed for her, and she can return there once she is medically stable  CM reserved [facility] in [facility] and updated patient's Facility Contact to reflect the same      Accepting Facility Name, [address] [facility] : [address]  Receiving Facility/Agency Phone Number: [phone]  Facility/Agency Fax Number: [phone]

## 2023-05-01 NOTE — ASSESSMENT & PLAN NOTE
· Nursing report 2 episodes of BRBPR over the course of last night  · This is patient's first occurrence and reports not having history   · GI consulted:   · Colonoscopy scheduled for today  · Hemoglobin at baseline, currently 9 2  · We will continue ASA, however stop Lovenox

## 2023-05-01 NOTE — CASE MANAGEMENT
Case Management Assessment & Discharge Planning Note    Patient name Mary Hernandez  Location [address] 210/-62 MRN [id number]  : 1945 Date 2023       Current Admission Date: 2023  Current Admission Diagnosis:Chronic obstructive pulmonary disease with acute exacerbation [facility])   Patient Active Problem List    Diagnosis Date Noted    Generalized weakness 2023    Staring episodes 2023    Waldenstrom macroglobulinemia ([address] ) 2023    Severe protein-calorie malnutrition ([address] ) 2021    Positive blood culture 2021    Chronic obstructive pulmonary disease with acute exacerbation ([address] ) 2021    SIRS (systemic inflammatory response syndrome) ([address] ) 2021    Anemia 2021    Abnormal computed tomography angiography (CTA) 2021    Fatigue 2021    Major depressive disorder, recurrent episode, moderate ([address] ) 2019    Flank pain 2019    Pneumonia 2019    Sepsis ([address] ) 2019    CAD (coronary artery disease), native coronary artery 2018    Chronic respiratory failure with hypoxia ([address] ) 2018    Ambulatory dysfunction 2018    Hypokalemia 2018    Essential hypertension 2018    Tobacco abuse 2018      LOS (days): 0  Geometric Mean LOS (GMLOS) (days):   Days to GMLOS:     OBJECTIVE:              Current admission status: Observation       Preferred Pharmacy:   [facility]pharmacy [address] [place] [address] [address] [place] [address]  Phone: [phone] Fax: [phone], [address]  [address] [address]  Phone: [phone] Fax: [phone]    Primary Care Provider: Alex Coronado MD    Primary Insurance: MEDICARE  Secondary Insurance:     ASSESSMENT:  [address], [address] [facility]   Primary Phone: [phone] (Home)               Advance Directives  Does patient have a [address]?: Yes  Does patient have Advance Directives?: Yes  Advance Directives: Power of  for health care, Power of  for finance, POLST  Primary Contact: Patient's Son    Readmission Root Cause  30 Day Readmission: No    Patient Information  Admitted from[de-identified] Facility (STR at [facility])  Mental Status: Alert  During Assessment patient was accompanied by: Not accompanied during assessment  Assessment information provided by[de-identified] Son  Primary Caregiver: Family  Caregiver's Name[de-identified] Pablo Media Relationship to Patient[de-identified] Family Member  Support Systems: Self, [address] of Residence: [address] do you live in?: [place] entry access options  Select all that apply : Stairs  Number of steps to enter home : 3  Do the steps have railings?: Yes  Type of Current Residence: [place]  In the last 12 months, was there a time when you were not able to pay the mortgage or rent on time?: No  In the last 12 months, how many places have you lived?: 2  In the last 12 months, was there a time when you did not have a steady place to sleep or slept in a shelter (including now)?: No  Homeless/housing insecurity resource given?: N/A  Living Arrangements: Lives w/ Son  Is patient a ?: No    Activities of Daily Living Prior to Admission  Functional Status: Assistance  Completes ADLs independently?: No  Level of ADL dependence: Assistance  Ambulates independently?: No  Level of ambulatory dependence: Assistance  Does patient use assisted devices?: Yes  Assisted Devices (DME) used: Catherin Headings, Bedside Commode, Nebulizer, Hospital Bed, Home Oxygen concentrator, Portable Oxygen concentrator  DME Company Name (respiratory supplies):  Unknown  O2 Rate(s): 3-4L  Does patient currently own DME?: Yes  What DME does the patient currently own?: Bedside Commode, Walker, Nebulizer  Does patient have a history of Outpatient Therapy (PT/OT)?: No  Does the patient have a history of Short-Term Rehab?: Yes (Patient was admitted to [facility] from [facility] )  Does patient have a history of HHC?: Yes (Patient has [address] Hx with Izmasha Custard )  Does patient currently have [address]?: No    Patient Information Continued  Income Source: SSI/SSD  Does patient have prescription coverage?: Yes (Patient's son reported that patient has been using [employer], because they offer pill packs  Son denied any barriers to obtaining or affording prescriptions )  Within the past 12 months, you worried that your food would run out before you got the money to buy more : Never true  Within the past 12 months, the food you bought just didn't last and you didn't have money to get more : Never true  Food insecurity resource given?: N/A  Does patient receive dialysis treatments?: No  Does patient have a history of substance abuse?: No  Does patient have a history of Mental Health Diagnosis?: Yes (anxiety, depression)  Is patient receiving treatment for mental health?: Yes  Has patient received inpatient treatment related to mental health in the last 2 years?: No    PHQ 2/9 Screening   Reviewed PHQ 2/9 Depression Screening Score?: No    Means of Transportation  Means of Transport to Appts[de-identified] Family transport  In the past 12 months, has lack of transportation kept you from medical appointments or from getting medications?: No  In the past 12 months, has lack of transportation kept you from meetings, work, or from getting things needed for daily living?: No  Was application for public transport provided?: N/A    DISCHARGE DETAILS:    Discharge planning discussed with[de-identified] Patient's Son  Freedom of Choice: Yes  Comments - Freedom of Choice: CM discussed freedom of choice as it pertains to discharge planning  Patient's son reported that he prefers that patient returns to [facility] to finish her STR and then transition back home with waiver services and [facility]   Patient's son reported that he is involved with [employer] to arrange home care for after STR  CM contacted family/caregiver?: Yes  Were Treatment Team discharge recommendations reviewed with patient/caregiver?: Yes  Did patient/caregiver verbalize understanding of patient care needs?: Yes  Were patient/caregiver advised of the risks associated with not following Treatment Team discharge recommendations?: Yes    Contacts  Patient Contacts: Juany Urban  Relationship to Patient[de-identified] Family  Contact Method: Phone  Phone Number: [phone]  Reason/Outcome: Continuity of Care, Discharge Planning    [facility]         [facility]?: No    DME Referral Provided  Referral made for DME?: No    Other Referral/Resources/Interventions Provided:  Interventions: Short Term Rehab  Referral Comments: CM sent a referral to [address] via [facility] to confirm they can accept her back once medically stable  Would you like to participate in our [address] service program?  : No - Declined    Treatment Team Recommendation: Short Term Rehab  Discharge Destination Plan[de-identified] Short Term Rehab  Transport at Discharge : [facility]  Dispatcher Contacted:  No

## 2023-05-01 NOTE — PROGRESS NOTES
[address] [place] [address]  Progress Note  Name: Angela Gotti  MRN: [id number]  Unit/Bed#: -01 I Date of Admission: 4/30/2023   Date of Service: 5/1/2023 I Hospital Day: 0    Assessment/Plan   * Chronic obstructive pulmonary disease with acute exacerbation ([facility] )  Assessment & Plan  · Patient coming in secondary to worsening shortness of breath over the past couple days  · Patient received nebulizer and steroids in the emergency department  · CTA chest abdomen pelvis: no pulmonary embolism, emphysema, chronic bronchitis, tree-in-bud opacities throughout both lungs  · Slight leukocytosis  · Will hold off on any antibiotics for possible pneumonia given negative imaging findings  · 5/1: Procalcitonin <0 05  · Continue IV steroids  · Nebulizers as needed  · Respiratory protocol  · Continue to monitor    BRBPR (bright red blood per rectum)  Assessment & Plan  · Nursing report 2 episodes of BRBPR over the course of last night  · This is patient's first occurrence and reports not having history  · GI consulted-we will start bowel prep tonight and conduct colonoscopy tomorrow  · Hemoglobin improved from baseline, currently at 10 6  · We will continue ASA, however stop Lovenox    Anemia  Assessment & Plan  · Patient with baseline anemia around 8-9  · Hemoglobin currently 10 6  · May be hemoconcentrated  · Continue to monitor    Major depressive disorder, recurrent episode, moderate ([facility] )  Assessment & Plan  · Continue home medications Wellbutrin and viibryd    CAD (coronary artery disease), native coronary artery  Assessment & Plan  · Continue home medications    Chronic respiratory failure with hypoxia (HCC)  Assessment & Plan  · Chronically on 3-4 L nasal cannula  · Currently on 3 L nasal cannula    Tobacco abuse  Assessment & Plan  · Discussed importance of smoking cessation for greater than 3 minutes  · Nicotine patch ordered    Essential hypertension  Assessment & Plan  · Blood pressure adequately controlled at this time  · Continue home blood pressure medication  · Continue to monitor         VTE Pharmacologic Prophylaxis: VTE Score: 3 Moderate Risk (Score 3-4) - Pharmacological DVT Prophylaxis Contraindicated  Sequential Compression Devices Ordered  Patient Centered Rounds: I performed bedside rounds with nursing staff today  Discussions with Specialists or Other Care Team Provider: Beto Morataya    Education and Discussions with Family / Patient: Updated  (son) at bedside  Total Time Spent on Date of Encounter in care of patient: 35 minutes This time was spent on one or more of the following: performing physical exam; counseling and coordination of care; obtaining or reviewing history; documenting in the medical record; reviewing/ordering tests, medications or procedures; communicating with other healthcare professionals and discussing with patient's family/caregivers  Current Length of Stay: 0 day(s)  Current Patient Status: Observation   Certification Statement: The patient will continue to require additional inpatient hospital stay due to Awaiting GI recommendations  Discharge Plan: Anticipate discharge in 24-48 hrs to discharge location to be determined pending rehab evaluations  Code Status: Level 3 - DNAR and DNI    Subjective:   Patient reports having some shortness of breath  Additionally she reports improvement every day since she has been here  Last night she reported to have multiple bowel movements with bright red blood  This is causing her to have significant anxiety  She denies chest pain/pressure, palpitations, lightheadedness, or nausea  Objective:     Vitals:   Temp (24hrs), Av °F (36 7 °C), Min:97 7 °F (36 5 °C), Max:98 2 °F (36 8 °C)    Temp:  [97 7 °F (36 5 °C)-98 2 °F (36 8 °C)] 97 7 °F (36 5 °C)  HR:  [] 87  Resp:  [16-21] 16  BP: (140-203)/() 161/93  SpO2:  [91 %-99 %] 91 %  Body mass index is 16 16 kg/m²       Input and Output Summary (last 24 hours): Intake/Output Summary (Last 24 hours) at 5/1/2023 1045  Last data filed at 5/1/2023 0357  Gross per 24 hour   Intake 50 ml   Output 750 ml   Net -700 ml       Physical Exam:   Physical Exam  Vitals and nursing note reviewed  Constitutional:       Appearance: She is normal weight  HENT:      Head: Normocephalic  Nose: Nose normal       Mouth/Throat:      Mouth: Mucous membranes are moist       Pharynx: Oropharynx is clear  Eyes:      General: No scleral icterus  Conjunctiva/sclera: Conjunctivae normal       Pupils: Pupils are equal, round, and reactive to light  Cardiovascular:      Rate and Rhythm: Normal rate and regular rhythm  Heart sounds: No friction rub  No gallop  Pulmonary:      Effort: Pulmonary effort is normal  No respiratory distress  Breath sounds: Normal breath sounds  No stridor  No wheezing, rhonchi or rales  Abdominal:      General: Abdomen is flat  Palpations: Abdomen is soft  Musculoskeletal:         General: Normal range of motion  Cervical back: Normal range of motion and neck supple  Right lower leg: No edema  Left lower leg: No edema  Lymphadenopathy:      Cervical: No cervical adenopathy  Skin:     General: Skin is warm  Coloration: Skin is not jaundiced or pale  Findings: No bruising, erythema or lesion  Neurological:      General: No focal deficit present  Mental Status: She is alert and oriented to person, place, and time  Mental status is at baseline  Cranial Nerves: No cranial nerve deficit  Motor: No weakness  Psychiatric:         Mood and Affect: Mood normal          Behavior: Behavior normal          Thought Content:  Thought content normal           Additional Data:     Labs:  Results from last 7 days   Lab Units 05/01/23  0417 04/30/23  0939   WBC Thousand/uL 10 69* 16 25*   HEMOGLOBIN g/dL 10 6* 10 3*   HEMATOCRIT % 33 1* 33 3*   PLATELETS Thousands/uL 200 192   NEUTROS PCT %  -- 59   LYMPHS PCT %  --  34   MONOS PCT %  --  7   EOS PCT %  --  0     Results from last 7 days   Lab Units 05/01/23  0417 04/30/23  0939   SODIUM mmol/L 136 134*   POTASSIUM mmol/L 3 6 4 2   CHLORIDE mmol/L 89* 88*   CO2 mmol/L 42* 44*   BUN mg/dL 15 18   CREATININE mg/dL 0 65 0 64   ANION GAP mmol/L 5 2*   CALCIUM mg/dL 9 7 10 0   ALBUMIN g/dL  --  3 7   TOTAL BILIRUBIN mg/dL  --  0 25   ALK PHOS U/L  --  74   ALT U/L  --  7   AST U/L  --  16   GLUCOSE RANDOM mg/dL 146* 121     Results from last 7 days   Lab Units 04/30/23  0939   INR  1 04             Results from last 7 days   Lab Units 05/01/23  0417 04/30/23  1609 04/30/23  0939   LACTIC ACID mmol/L  --   --  0 6   PROCALCITONIN ng/ml <0 05 <0 05  --        Lines/Drains:  Invasive Devices     Peripheral Intravenous Line  Duration           Peripheral IV 05/01/23 Distal;Dorsal (posterior); Left Forearm <1 day          Drain  Duration           External Urinary Catheter 31 days                      Imaging: Reviewed radiology reports from this admission including: chest xray and CTA chest abdomen pelvis    Recent Cultures (last 7 days):   Results from last 7 days   Lab Units 04/30/23  0943 04/30/23  0940   BLOOD CULTURE  Received in Microbiology Lab  Culture in Progress  Received in Microbiology Lab  Culture in Progress         Last 24 Hours Medication List:   Current Facility-Administered Medications   Medication Dose Route Frequency Provider Last Rate    acetaminophen  650 mg Oral Q6H PRN Osprey Balm, DO      aspirin  81 mg Oral Daily Osprey Balm, DO      azithromycin  500 mg Intravenous Q24H Osprey Balm,  mg (04/30/23 1619)    benzonatate  100 mg Oral TID PRN Osprey Balm, DO      budesonide-formoterol  2 puff Inhalation BID Osprey Balm, DO      buPROPion  75 mg Oral Daily Osprey Balm, DO      carvedilol  25 mg Oral BID With Meals Osprey Balm, DO      ferrous sulfate  325 mg Oral Daily With Breakfast Mila Mohr, DO      furosemide  20 mg Oral Daily Mila Mohr, DO      guaiFENesin  600 mg Oral BID Mila Mohr, DO      hydrochlorothiazide  25 mg Oral Daily Mila Mohr, DO      HYDROcodone-acetaminophen  1 tablet Oral Q6H PRN Mila Mohr, DO      hydrOXYzine HCL  25 mg Oral Q6H PRN Manuel Olivo PA-C      ipratropium  0 5 mg Nebulization TID Mila Mohr, DO      levalbuterol  1 25 mg Nebulization TID Mila Mohr, DO      lisinopril  20 mg Oral BID Mila Mohr, DO      melatonin  3 mg Oral HS PRN Mila Mohr, DO      methylPREDNISolone sodium succinate  40 mg Intravenous Q12H [address] Mila Mohr, DO      nicotine  1 patch Transdermal Daily Mila Mohr, DO      nitroglycerin  0 4 mg Sublingual Q5 Min PRN Mila Mohr, DO      ondansetron  4 mg Intravenous Q6H PRN Barry Puckett PA-C      vilazodone  40 mg Oral Daily With Breakfast Mila Mohr, DO          Today, Patient Was Seen By: Manuel Olivo PA-C    **Please Note: This note may have been constructed using a voice recognition system  **

## 2023-05-01 NOTE — ASSESSMENT & PLAN NOTE
· Patient with baseline anemia around 8-9  · Hemoglobin currently 9 2  · May be hemoconcentrated  · Continue to monitor

## 2023-05-01 NOTE — PLAN OF CARE
Problem: Potential for Falls  Goal: Patient will remain free of falls  Description: INTERVENTIONS:  - Educate patient/family on patient safety includng physical limitations  - Instruct patient to call for assistance with activity   - Consult OT/PT to assist with strengthening/mobility   - Keep Call bell within reach        - Keep bed low and locked with side rails adjusted as appropriate  - Keep care items and personal belongings within reach  - Initiate and maintain comfort rounds  - Make Fall Risk Sign visible to staff  - Offer Toileting every  Hours, in advance of need  - Initiate/Maintain alarm  - Obtain necessary fall risk management equipment  - Apply yellow socks and bracelet for high fall risk patients  - Consider moving patient to room near nurses station  4/30/2023 1959 by Stephen Cary, RN  Outcome: Progressing  4/30/2023 1544 by Stephen Cary, RN  Outcome: Progressing

## 2023-05-01 NOTE — ASSESSMENT & PLAN NOTE
· Nursing report 2 episodes of BRBPR over the course of last night  · This is patient's first occurrence and reports not having history  · GI consulted  · Hemoglobin improved from baseline, currently at 10 6  · We will continue ASA, however stop Lovenox

## 2023-05-01 NOTE — RESPIRATORY THERAPY NOTE
RT Protocol Note  Starr Sprain 69 y o  female MRN: [id number]  Unit/Bed#: -01 Encounter: [id number]    Assessment    Principal Problem:    Chronic obstructive pulmonary disease with acute exacerbation ([place] [address] )  Active Problems:    Essential hypertension    Tobacco abuse    Chronic respiratory failure with hypoxia ([facility])    CAD (coronary artery disease), native coronary artery    Major depressive disorder, recurrent episode, moderate (HCC)    Anemia    BRBPR (bright red blood per rectum)      Home Pulmonary Medications:    Home Devices/Therapy: Home O2    Past Medical History:   Diagnosis Date    Acute congestive heart failure ([place] [address] ) 8/27/2021    Anxiety     Cardiac disease     Chest pain 8/27/2021    Chronic pain disorder     COPD (chronic obstructive pulmonary disease) ([facility])     Depression     Heart disease     Hyperlipidemia     Hypertension     MI (myocardial infarction) ([place] [address] )     MRSA (methicillin resistant Staphylococcus aureus)     Renal disorder     benign kidney tumor     Social History     Socioeconomic History    Marital status:      Spouse name: None    Number of children: 3    Years of education: 15    Highest education level: High school graduate   Occupational History    Occupation: [address]     Employer: [employer]     Comment: retired    Tobacco Use    Smoking status: Every Day     Packs/day: 1 00     Years: 60 00     Pack years: 60 00     Types: Cigarettes    Smokeless tobacco: Never    Tobacco comments:     She has close to a 60 pack-year smoking history, most recently has cut down to 4-5 cigarettes daily   Vaping Use    Vaping Use: Never used   Substance and Sexual Activity    Alcohol use: Never    Drug use: No    Sexual activity: Not Currently   Other Topics Concern    None   Social History Narrative    None     Social Determinants of Health     Financial Resource Strain: Not on file   Food Insecurity: No Food Insecurity    Worried About "Running Out of Food in the Last Year: Never true    Ran Out of Food in the Last Year: Never true   Transportation Needs: No Transportation Needs    Lack of Transportation (Medical): No    Lack of Transportation (Non-Medical): No   Physical Activity: Not on file   Stress: Not on file   Social Connections: Not on file   Intimate Partner Violence: Not on file   Housing Stability: Low Risk     Unable to Pay for Housing in the Last Year: No    Number of Places Lived in the Last Year: 2    Unstable Housing in the Last Year: No       Subjective         Objective    Physical Exam:        Vitals:  Blood pressure 161/93, pulse 87, temperature 97 7 °F (36 5 °C), temperature source Oral, resp  rate 16, height 5' 7\" (1 702 m), weight 46 8 kg (103 lb 2 8 oz), SpO2 93 %, not currently breastfeeding  Imaging and other studies: I have personally reviewed pertinent reports        O2 Device: NC     Plan    Respiratory Plan: Mild Distress pathway        Resp Comments: will continue with current tx     "

## 2023-05-01 NOTE — PLAN OF CARE
Problem: Potential for Falls  Goal: Patient will remain free of falls  Description: INTERVENTIONS:  - Educate patient/family on patient safety including physical limitations  - Instruct patient to call for assistance with activity   - Consult OT/PT to assist with strengthening/mobility   - Keep Call bell within reach  - Keep bed low and locked with side rails adjusted as appropriate  - Keep care items and personal belongings within reach  - Initiate and maintain comfort rounds  - Make Fall Risk Sign visible to staff  - Offer Toileting every  Hours, in advance of need  - Initiate/Maintain alarm  - Obtain necessary fall risk management equipment:   - Apply yellow socks and bracelet for high fall risk patients  - Consider moving patient to room near nurses station  Outcome: Progressing     Problem: PAIN - ADULT  Goal: Verbalizes/displays adequate comfort level or baseline comfort level  Description: Interventions:  - Encourage patient to monitor pain and request assistance  - Assess pain using appropriate pain scale  - Administer analgesics based on type and severity of pain and evaluate response  - Implement non-pharmacological measures as appropriate and evaluate response  - Consider cultural and social influences on pain and pain management  - Notify physician/advanced practitioner if interventions unsuccessful or patient reports new pain  Outcome: Progressing     Problem: INFECTION - ADULT  Goal: Absence or prevention of progression during hospitalization  Description: INTERVENTIONS:  - Assess and monitor for signs and symptoms of infection  - Monitor lab/diagnostic results  - Monitor all insertion sites, i e  indwelling lines, tubes, and drains  - Monitor endotracheal if appropriate and nasal secretions for changes in amount and color  - [place] appropriate cooling/warming therapies per order  - Administer medications as ordered  - Instruct and encourage patient and family to use good hand hygiene technique  - Identify and instruct in appropriate isolation precautions for identified infection/condition  Outcome: Progressing  Goal: Absence of fever/infection during neutropenic period  Description: INTERVENTIONS:  - Monitor WBC    Outcome: Progressing     Problem: SAFETY ADULT  Goal: Patient will remain free of falls  Description: INTERVENTIONS:  - Educate patient/family on patient safety including physical limitations  - Instruct patient to call for assistance with activity   - Consult OT/PT to assist with strengthening/mobility   - Keep Call bell within reach  - Keep bed low and locked with side rails adjusted as appropriate  - Keep care items and personal belongings within reach  - Initiate and maintain comfort rounds  - Make Fall Risk Sign visible to staff  - Offer Toileting every  Hours, in advance of need  - Initiate/Maintain alarm  - Obtain necessary fall risk management equipment:   - Apply yellow socks and bracelet for high fall risk patients  - Consider moving patient to room near nurses station  Outcome: Progressing  Goal: Maintain or return to baseline ADL function  Description: INTERVENTIONS:  -  Assess patient's ability to carry out ADLs; assess patient's baseline for ADL function and identify physical deficits which impact ability to perform ADLs (bathing, care of mouth/teeth, toileting, grooming, dressing, etc )  - Assess/evaluate cause of self-care deficits   - Assess range of motion  - Assess patient's mobility; develop plan if impaired  - Assess patient's need for assistive devices and provide as appropriate  - Encourage maximum independence but intervene and supervise when necessary  - Involve family in performance of ADLs  - Assess for home care needs following discharge   - Consider OT consult to assist with ADL evaluation and planning for discharge  - Provide patient education as appropriate  Outcome: Progressing  Goal: Maintains/Returns to pre admission functional level  Description: INTERVENTIONS:  - Perform BMAT or MOVE assessment daily    - Set and communicate daily mobility goal to care team and patient/family/caregiver  - Collaborate with rehabilitation services on mobility goals if consulted  - Perform Range of Motion  times a day  - Reposition patient every  hours  - Dangle patient  times a day  - Stand patient  times a day  - Ambulate patient  times a day  - Out of bed to chair  times a day   - Out of bed for meals times a day  - Out of bed for toileting  - Record patient progress and toleration of activity level   Outcome: Progressing     Problem: DISCHARGE PLANNING  Goal: Discharge to home or other facility with appropriate resources  Description: INTERVENTIONS:  - Identify barriers to discharge w/patient and caregiver  - Arrange for needed discharge resources and transportation as appropriate  - Identify discharge learning needs (meds, wound care, etc )  - Arrange for interpretive services to assist at discharge as needed  - Refer to Case Management Department for coordinating discharge planning if the patient needs post-hospital services based on physician/advanced practitioner order or complex needs related to functional status, cognitive ability, or social support system  Outcome: Progressing     Problem: Knowledge Deficit  Goal: Patient/family/caregiver demonstrates understanding of disease process, treatment plan, medications, and discharge instructions  Description: Complete learning assessment and assess knowledge base    Interventions:  - Provide teaching at level of understanding  - Provide teaching via preferred learning methods  Outcome: Progressing

## 2023-05-01 NOTE — MALNUTRITION/BMI
This medical record reflects one or more clinical indicators suggestive of malnutrition    Malnutrition Findings:   Adult Malnutrition type: Chronic illness  Adult Degree of Malnutrition: Malnutrition of moderate degree  Malnutrition Characteristics: Muscle loss, Fat loss          360 Statement: Related to catabolic illness as evidenced by mild depletion of body fat ([place]) and mild depletion of muscle mass ([place]) treated with diet and oral nutrition supplements  See Nutrition note dated 5/1/23 for additional details  Completed nutrition assessment is viewable in the nutrition documentation

## 2023-05-02 PROBLEM — E44.0 MODERATE PROTEIN-CALORIE MALNUTRITION (HCC): Status: ACTIVE | Noted: 2023-05-02

## 2023-05-02 LAB
ANION GAP SERPL CALCULATED.3IONS-SCNC: 4 MMOL/L (ref 4–13)
BUN SERPL-MCNC: 18 MG/DL (ref 5–25)
CALCIUM SERPL-MCNC: 9.4 MG/DL (ref 8.4–10.2)
CHLORIDE SERPL-SCNC: 93 MMOL/L (ref 96–108)
CO2 SERPL-SCNC: 44 MMOL/L (ref 21–32)
CREAT SERPL-MCNC: 0.69 MG/DL (ref 0.6–1.3)
ERYTHROCYTE [DISTWIDTH] IN BLOOD BY AUTOMATED COUNT: 15.5 % (ref 11.6–15.1)
GFR SERPL CREATININE-BSD FRML MDRD: 84 ML/MIN/1.73SQ M
GLUCOSE SERPL-MCNC: 99 MG/DL (ref 65–140)
HCT VFR BLD AUTO: 30.1 % (ref 34.8–46.1)
HGB BLD-MCNC: 9.2 G/DL (ref 11.5–15.4)
MCH RBC QN AUTO: 28.8 PG (ref 26.8–34.3)
MCHC RBC AUTO-ENTMCNC: 30.6 G/DL (ref 31.4–37.4)
MCV RBC AUTO: 94 FL (ref 82–98)
MRSA NOSE QL CULT: NORMAL
PLATELET # BLD AUTO: 194 THOUSANDS/UL (ref 149–390)
PMV BLD AUTO: 9.5 FL (ref 8.9–12.7)
POTASSIUM SERPL-SCNC: 3.3 MMOL/L (ref 3.5–5.3)
RBC # BLD AUTO: 3.2 MILLION/UL (ref 3.81–5.12)
SODIUM SERPL-SCNC: 141 MMOL/L (ref 135–147)
WBC # BLD AUTO: 18.78 THOUSAND/UL (ref 4.31–10.16)

## 2023-05-02 RX ORDER — POLYETHYLENE GLYCOL 3350 17 G/17G
238 POWDER, FOR SOLUTION ORAL ONCE
Status: COMPLETED | OUTPATIENT
Start: 2023-05-02 | End: 2023-05-02

## 2023-05-02 RX ORDER — LEVALBUTEROL INHALATION SOLUTION 1.25 MG/3ML
1.25 SOLUTION RESPIRATORY (INHALATION)
Status: DISCONTINUED | OUTPATIENT
Start: 2023-05-02 | End: 2023-05-05 | Stop reason: HOSPADM

## 2023-05-02 RX ADMIN — BUDESONIDE AND FORMOTEROL FUMARATE DIHYDRATE 2 PUFF: 160; 4.5 AEROSOL RESPIRATORY (INHALATION) at 08:15

## 2023-05-02 RX ADMIN — BENZONATATE 100 MG: 100 CAPSULE ORAL at 06:34

## 2023-05-02 RX ADMIN — LISINOPRIL 20 MG: 20 TABLET ORAL at 08:13

## 2023-05-02 RX ADMIN — LEVALBUTEROL HYDROCHLORIDE 1.25 MG: 1.25 SOLUTION RESPIRATORY (INHALATION) at 19:26

## 2023-05-02 RX ADMIN — CARVEDILOL 25 MG: 12.5 TABLET, FILM COATED ORAL at 08:13

## 2023-05-02 RX ADMIN — LISINOPRIL 20 MG: 20 TABLET ORAL at 17:21

## 2023-05-02 RX ADMIN — FUROSEMIDE 20 MG: 20 TABLET ORAL at 08:14

## 2023-05-02 RX ADMIN — HYDROCODONE BITARTRATE AND ACETAMINOPHEN 1 TABLET: 5; 325 TABLET ORAL at 15:35

## 2023-05-02 RX ADMIN — HYDROCHLOROTHIAZIDE 25 MG: 25 TABLET ORAL at 08:13

## 2023-05-02 RX ADMIN — CARVEDILOL 25 MG: 12.5 TABLET, FILM COATED ORAL at 15:36

## 2023-05-02 RX ADMIN — LEVALBUTEROL HYDROCHLORIDE 1.25 MG: 1.25 SOLUTION, CONCENTRATE RESPIRATORY (INHALATION) at 07:07

## 2023-05-02 RX ADMIN — BUDESONIDE AND FORMOTEROL FUMARATE DIHYDRATE 2 PUFF: 160; 4.5 AEROSOL RESPIRATORY (INHALATION) at 17:21

## 2023-05-02 RX ADMIN — BUPROPION HYDROCHLORIDE TABLETS 75 MG: 75 TABLET, FILM COATED ORAL at 08:14

## 2023-05-02 RX ADMIN — GUAIFENESIN 600 MG: 600 TABLET ORAL at 08:13

## 2023-05-02 RX ADMIN — IPRATROPIUM BROMIDE 0.5 MG: 0.5 SOLUTION RESPIRATORY (INHALATION) at 07:07

## 2023-05-02 RX ADMIN — VILAZODONE HYDROCHLORIDE 40 MG: 20 TABLET ORAL at 08:15

## 2023-05-02 RX ADMIN — HYDROXYZINE HYDROCHLORIDE 25 MG: 25 TABLET ORAL at 09:33

## 2023-05-02 RX ADMIN — ASPIRIN 81 MG: 81 TABLET, COATED ORAL at 08:14

## 2023-05-02 RX ADMIN — IPRATROPIUM BROMIDE 0.5 MG: 0.5 SOLUTION RESPIRATORY (INHALATION) at 19:26

## 2023-05-02 RX ADMIN — BENZONATATE 100 MG: 100 CAPSULE ORAL at 15:35

## 2023-05-02 RX ADMIN — POLYETHYLENE GLYCOL 3350 238 G: 17 POWDER, FOR SOLUTION ORAL at 10:33

## 2023-05-02 RX ADMIN — IPRATROPIUM BROMIDE 0.5 MG: 0.5 SOLUTION RESPIRATORY (INHALATION) at 13:14

## 2023-05-02 RX ADMIN — LEVALBUTEROL HYDROCHLORIDE 1.25 MG: 1.25 SOLUTION, CONCENTRATE RESPIRATORY (INHALATION) at 13:14

## 2023-05-02 RX ADMIN — GUAIFENESIN 600 MG: 600 TABLET ORAL at 17:21

## 2023-05-02 RX ADMIN — PREDNISONE 40 MG: 20 TABLET ORAL at 08:13

## 2023-05-02 RX ADMIN — FERROUS SULFATE TAB 325 MG (65 MG ELEMENTAL FE) 325 MG: 325 (65 FE) TAB at 08:13

## 2023-05-02 NOTE — PLAN OF CARE
Problem: Potential for Falls  Goal: Patient will remain free of falls  Description: INTERVENTIONS:  - Educate patient/family on patient safety including physical limitations  - Instruct patient to call for assistance with activity   - Consult OT/PT to assist with strengthening/mobility   - Keep Call bell within reach  - Keep bed low and locked with side rails adjusted as appropriate  - Keep care items and personal belongings within reach  - Initiate and maintain comfort rounds  - Make Fall Risk Sign visible to staff  - Offer Toileting every 2 Hours, in advance of need  - Initiate/Maintain bed alarm  - Obtain necessary fall risk management equipment:    - Apply yellow socks and bracelet for high fall risk patients  - Consider moving patient to room near nurses station  Outcome: Progressing     Problem: PAIN - ADULT  Goal: Verbalizes/displays adequate comfort level or baseline comfort level  Description: Interventions:  - Encourage patient to monitor pain and request assistance  - Assess pain using appropriate pain scale  - Administer analgesics based on type and severity of pain and evaluate response  - Implement non-pharmacological measures as appropriate and evaluate response  - Consider cultural and social influences on pain and pain management  - Notify physician/advanced practitioner if interventions unsuccessful or patient reports new pain  Outcome: Progressing     Problem: INFECTION - ADULT  Goal: Absence or prevention of progression during hospitalization  Description: INTERVENTIONS:  - Assess and monitor for signs and symptoms of infection  - Monitor lab/diagnostic results  - Monitor all insertion sites, i e  indwelling lines, tubes, and drains  - Monitor endotracheal if appropriate and nasal secretions for changes in amount and color  - [place] appropriate cooling/warming therapies per order  - Administer medications as ordered  - Instruct and encourage patient and family to use good hand hygiene technique  - Identify and instruct in appropriate isolation precautions for identified infection/condition  Outcome: Progressing  Goal: Absence of fever/infection during neutropenic period  Description: INTERVENTIONS:  - Monitor WBC    Outcome: Progressing     Problem: SAFETY ADULT  Goal: Patient will remain free of falls  Description: INTERVENTIONS:  - Educate patient/family on patient safety including physical limitations  - Instruct patient to call for assistance with activity   - Consult OT/PT to assist with strengthening/mobility   - Keep Call bell within reach  - Keep bed low and locked with side rails adjusted as appropriate  - Keep care items and personal belongings within reach  - Initiate and maintain comfort rounds  - Make Fall Risk Sign visible to staff  - Offer Toileting every 2 Hours, in advance of need  - Initiate/Maintain bed alarm  - Obtain necessary fall risk management equipment:    - Apply yellow socks and bracelet for high fall risk patients  - Consider moving patient to room near nurses station  Outcome: Progressing  Goal: Maintain or return to baseline ADL function  Description: INTERVENTIONS:  -  Assess patient's ability to carry out ADLs; assess patient's baseline for ADL function and identify physical deficits which impact ability to perform ADLs (bathing, care of mouth/teeth, toileting, grooming, dressing, etc )  - Assess/evaluate cause of self-care deficits   - Assess range of motion  - Assess patient's mobility; develop plan if impaired  - Assess patient's need for assistive devices and provide as appropriate  - Encourage maximum independence but intervene and supervise when necessary  - Involve family in performance of ADLs  - Assess for home care needs following discharge   - Consider OT consult to assist with ADL evaluation and planning for discharge  - Provide patient education as appropriate  Outcome: Progressing  Goal: Maintains/Returns to pre admission functional level  Description: INTERVENTIONS:  - Perform BMAT or MOVE assessment daily    - Set and communicate daily mobility goal to care team and patient/family/caregiver  - Collaborate with rehabilitation services on mobility goals if consulted  - Perform Range of Motion 3 times a day  - Reposition patient every 2 hours  - Dangle patient 3 times a day  - Stand patient 3 times a day  - Ambulate patient 3 times a day  - Out of bed to chair 3 times a day   - Out of bed for meals 3 times a day  - Out of bed for toileting  - Record patient progress and toleration of activity level   Outcome: Progressing     Problem: DISCHARGE PLANNING  Goal: Discharge to home or other facility with appropriate resources  Description: INTERVENTIONS:  - Identify barriers to discharge w/patient and caregiver  - Arrange for needed discharge resources and transportation as appropriate  - Identify discharge learning needs (meds, wound care, etc )  - Arrange for interpretive services to assist at discharge as needed  - Refer to Case Management Department for coordinating discharge planning if the patient needs post-hospital services based on physician/advanced practitioner order or complex needs related to functional status, cognitive ability, or social support system  Outcome: Progressing     Problem: Knowledge Deficit  Goal: Patient/family/caregiver demonstrates understanding of disease process, treatment plan, medications, and discharge instructions  Description: Complete learning assessment and assess knowledge base    Interventions:  - Provide teaching at level of understanding  - Provide teaching via preferred learning methods  Outcome: Progressing     Problem: MOBILITY - ADULT  Goal: Maintain or return to baseline ADL function  Description: INTERVENTIONS:  -  Assess patient's ability to carry out ADLs; assess patient's baseline for ADL function and identify physical deficits which impact ability to perform ADLs (bathing, care of mouth/teeth, toileting, grooming, dressing, etc )  - Assess/evaluate cause of self-care deficits   - Assess range of motion  - Assess patient's mobility; develop plan if impaired  - Assess patient's need for assistive devices and provide as appropriate  - Encourage maximum independence but intervene and supervise when necessary  - Involve family in performance of ADLs  - Assess for home care needs following discharge   - Consider OT consult to assist with ADL evaluation and planning for discharge  - Provide patient education as appropriate  Outcome: Progressing  Goal: Maintains/Returns to pre admission functional level  Description: INTERVENTIONS:  - Perform BMAT or MOVE assessment daily    - Set and communicate daily mobility goal to care team and patient/family/caregiver  - Collaborate with rehabilitation services on mobility goals if consulted  - Perform Range of Motion 3 times a day  - Reposition patient every 2 hours    - Dangle patient 3 times a day  - Stand patient 3 times a day  - Ambulate patient 3 times a day  - Out of bed to chair 3 times a day   - Out of bed for meals 3 times a day  - Out of bed for toileting  - Record patient progress and toleration of activity level   Outcome: Progressing     Problem: Prexisting or High Potential for Compromised Skin Integrity  Goal: Skin integrity is maintained or improved  Description: INTERVENTIONS:  - Identify patients at risk for skin breakdown  - Assess and monitor skin integrity  - Assess and monitor nutrition and hydration status  - Monitor labs   - Assess for incontinence   - Turn and reposition patient  - Assist with mobility/ambulation  - Relieve pressure over bony prominences  - Avoid friction and shearing  - Provide appropriate hygiene as needed including keeping skin clean and dry  - Evaluate need for skin moisturizer/barrier cream  - Collaborate with interdisciplinary team   - Patient/family teaching  - Consider wound care consult   Outcome: Progressing Problem: Nutrition/Hydration-ADULT  Goal: Nutrient/Hydration intake appropriate for improving, restoring or maintaining nutritional needs  Description: Monitor and assess patient's nutrition/hydration status for malnutrition  Collaborate with interdisciplinary team and initiate plan and interventions as ordered  Monitor patient's weight and dietary intake as ordered or per policy  Utilize nutrition screening tool and intervene as necessary  Determine patient's food preferences and provide high-protein, high-caloric foods as appropriate       INTERVENTIONS:  - Monitor oral intake, urinary output, labs, and treatment plans  - Assess nutrition and hydration status and recommend course of action  - Evaluate amount of meals eaten  - Assist patient with eating if necessary   - Allow adequate time for meals  - Recommend/ encourage appropriate diets, oral nutritional supplements, and vitamin/mineral supplements  - Order, calculate, and assess calorie counts as needed  - Recommend, monitor, and adjust tube feedings and TPN/PPN based on assessed needs  - Assess need for intravenous fluids  - Provide specific nutrition/hydration education as appropriate  - Include patient/family/caregiver in decisions related to nutrition  Outcome: Progressing

## 2023-05-02 NOTE — RESPIRATORY THERAPY NOTE
RT Protocol Note  Samantha Laws 69 y o  female MRN: [id number]  Unit/Bed#: -01 Encounter: [id number]    Assessment    Principal Problem:    Chronic obstructive pulmonary disease with acute exacerbation ([place] [address] )  Active Problems:    Essential hypertension    Tobacco abuse    Chronic respiratory failure with hypoxia ([facility])    CAD (coronary artery disease), native coronary artery    Major depressive disorder, recurrent episode, moderate (HCC)    Anemia    BRBPR (bright red blood per rectum)    Moderate protein-calorie malnutrition (HCC)      Home Pulmonary Medications:    Home Devices/Therapy: Home O2    Past Medical History:   Diagnosis Date    Acute congestive heart failure ([place] [address] ) 8/27/2021    Anxiety     Cardiac disease     Chest pain 8/27/2021    Chronic pain disorder     COPD (chronic obstructive pulmonary disease) ([facility])     Depression     Heart disease     Hyperlipidemia     Hypertension     MI (myocardial infarction) ([place] [address] )     MRSA (methicillin resistant Staphylococcus aureus)     Renal disorder     benign kidney tumor     Social History     Socioeconomic History    Marital status:      Spouse name: None    Number of children: 3    Years of education: 15    Highest education level: High school graduate   Occupational History    Occupation: [employer]     Employer: [employer]     Comment: retired    Tobacco Use    Smoking status: Every Day     Packs/day: 1 00     Years: 60 00     Pack years: 60 00     Types: Cigarettes    Smokeless tobacco: Never    Tobacco comments:     She has close to a 60 pack-year smoking history, most recently has cut down to 4-5 cigarettes daily   Vaping Use    Vaping Use: Never used   Substance and Sexual Activity    Alcohol use: Never    Drug use: No    Sexual activity: Not Currently   Other Topics Concern    None   Social History Narrative    None     Social Determinants of Health     Financial Resource Strain: Not on file   Food "Insecurity: No Food Insecurity    Worried About Running Out of Food in the Last Year: Never true    Ran Out of Food in the Last Year: Never true   Transportation Needs: No Transportation Needs    Lack of Transportation (Medical): No    Lack of Transportation (Non-Medical): No   Physical Activity: Not on file   Stress: Not on file   Social Connections: Not on file   Intimate Partner Violence: Not on file   Housing Stability: Low Risk     Unable to Pay for Housing in the Last Year: No    Number of Places Lived in the Last Year: 2    Unstable Housing in the Last Year: No       Subjective         Objective    Physical Exam:        Vitals:  Blood pressure 124/69, pulse 88, temperature 98 3 °F (36 8 °C), temperature source Oral, resp  rate 20, height 5' 7\" (1 702 m), weight 41 2 kg (90 lb 13 3 oz), SpO2 90 %, not currently breastfeeding  Imaging and other studies: I have personally reviewed pertinent reports        O2 Device: NC     Plan    Respiratory Plan: Mild Distress pathway        Resp Comments: will continue with current tx plan     "

## 2023-05-02 NOTE — PLAN OF CARE
Problem: OCCUPATIONAL THERAPY ADULT  Goal: Performs self-care activities at highest level of function for planned discharge setting  See evaluation for individualized goals  Description: Treatment Interventions: ADL retraining, Functional transfer training, UE strengthening/ROM, Endurance training, Patient/family training, Compensatory technique education, Activityengagement, Equipment evaluation/education, Energy conservation          See flowsheet documentation for full assessment, interventions and recommendations  Note: Limitation: Decreased ADL status, Decreased UE strength, Decreased endurance, Decreased self-care trans, Decreased high-level ADLs  Prognosis: Good  Assessment: Patient is a 69 y o  female seen for OT evaluation s/p admit to [id number] [place] [address]  on 4/30/2023 w/Chronic obstructive pulmonary disease with acute exacerbation ([facility] [address] )  Commorbidities affecting patient's functional performance at time of assessment include: HTN, tobacco abuse, chronic respiratory failure c hypoxia, CAD, MDD, and malnutrition  Orders placed for OT evaluation and treatment and up and OOB as tolerated   Performed at least two patient identifiers during session including name and wristband  Prior to admission, Patient reporting being independent with ADLs, ambulatory with RW, and lives with son  Personal factors affecting patient at time of initial evaluation include: steps to enter, difficulty performing ADLs and difficulty performing IADLs  Upon evaluation, patient requires supervision and minimal  assist for UB ADLs, minimal  and moderate assist for LB ADLs, transfers and functional ambulation in room and bathroom with minimal  assist, with the use of Rolling Walker  Patient is oriented x 4    Occupational performance is affected by the following deficits: decreased functional reach, limited active ROM, decreased muscle strength, dynamic sit/ stand balance deficit with poor standing tolerance time for self care and functional mobility, decreased activity tolerance and delayed righting and equilibrium reactions  Patient to benefit from continued Occupational Therapy treatment while in the hospital to address deficits as defined above and maximize level of functional independence with ADLs and functional mobility  Occupational Performance areas to address include: grooming , bathing/ shower, dressing, toilet hygiene, transfer to all surfaces and functional ambulation  From OT standpoint, recommendation at time of d/c would be Post acute rehabilitation services       OT Discharge Recommendation: Post acute rehabilitation services     [facility] OTD, OTR/L

## 2023-05-02 NOTE — OCCUPATIONAL THERAPY NOTE
Occupational Therapy Evaluation      Jun Lopez    5/2/2023    Patient Active Problem List   Diagnosis    Ambulatory dysfunction    Hypokalemia    Essential hypertension    Tobacco abuse    Chronic respiratory failure with hypoxia (HCC)    CAD (coronary artery disease), native coronary artery    Flank pain    Pneumonia    Sepsis ([facility] )    Major depressive disorder, recurrent episode, moderate (HCC)    Fatigue    Chronic obstructive pulmonary disease with acute exacerbation (HCC)    SIRS (systemic inflammatory response syndrome) (HCC)    Anemia    Abnormal computed tomography angiography (CTA)    Severe protein-calorie malnutrition (HCC)    Positive blood culture    Waldenstrom macroglobulinemia (HCC)    Generalized weakness    Staring episodes    BRBPR (bright red blood per rectum)    Moderate protein-calorie malnutrition (HCC)       Past Medical History:   Diagnosis Date    Acute congestive heart failure ([facility] [facility] ) 8/27/2021    Anxiety     Cardiac disease     Chest pain 8/27/2021    Chronic pain disorder     COPD (chronic obstructive pulmonary disease) (HCC)     Depression     Heart disease     Hyperlipidemia     Hypertension     MI (myocardial infarction) ([facility] [address] )     MRSA (methicillin resistant Staphylococcus aureus)     Renal disorder     benign kidney tumor       Past Surgical History:   Procedure Laterality Date    APPENDECTOMY      ELBOW BURSA SURGERY Left 02/2018    D/T MRSA INFECTION    SPINAL FUSION      L7 L9        05/02/23 0811   OT Last Visit   OT Visit Date 05/02/23   Note Type   Note type Evaluation   Additional Comments Pt agreeable to OT eval  Upon arrival supine in bed with HOB elevated  Pain Assessment   Pain Assessment Tool 0-10   Pain Score No Pain   Restrictions/Precautions   Weight Bearing Precautions Per Order No   Braces or Orthoses   (none reported)   Other Precautions Chair Alarm; Bed Alarm;Multiple lines;O2;Fall Risk  (4 L O2 via NC- used at baseline)   Home Living   Type of [place] [address] Layout Two level;Performs ADLs on one level; Able to live on main level with bedroom/bathroom;Stairs to enter with rails  (4 [address]LINA; 1st floor set-up)   Bathroom Shower/Tub Walk-in shower   [address] [place] [address]  chair;Grab bars in shower;Grab bars around toilet; Toilet raiser   [facility]; Wheelchair-manual  (utilizes RW at baseline)   Prior Function   Level of [place] Independent with ADLs; Independent with functional mobility; Needs assistance with IADLS   Lives With Beverlee Foots Help From Family   IADLs Family/Friend/Other provides transportation; Family/Friend/Other provides meals; Independent with medication management   Falls in the last 6 months 0   Vocational Retired   Comments SpO2 decreased to 85-86% with interview questions/discussion while supine in bed  Lifestyle   Autonomy Patient reporting being independent with ADLs, ambulatory with RW, and lives with son   Reciprocal Relationships Son assists c IADLs   Service to Others Retired   ADL   Eating Assistance 6  Modified independent   [facility] 5  Supervision/Setup   [address] 5  Supervision/Setup   LB [address] 4  [address] [facility] 4  [address] 3  Moderate Assistance   [address]  3  Moderate Assistance   Additional Comments ADL levels based on functional performance during OT eval    Bed Mobility   Supine to Sit 5  Supervision   Additional items Assist x 1;HOB elevated; Bedrails; Increased time required;Verbal cues   Sit to Supine 5  Supervision   Additional items Assist x 1;HOB elevated; Bedrails; Increased time required;Verbal cues   Transfers   Sit to Stand 4  Minimal assistance   Additional items Assist x 1;Bedrails; Increased time required;Verbal cues   Stand to Sit 4  Minimal assistance   Additional items Assist x 1;Bedrails; Increased time required;Verbal cues   Additional Comments Pt requires verbal cues for safety and proper body mechanics   Functional Mobility   Functional Mobility 4  Minimal assistance  (Assist of 2)   Additional Comments Pt ambulated short side-steps towards HOB  SpO2 decreased to 81% on 4 L O2  Encouraged PLB and deep breathing upon return to bed  RN made aware  Additional items Rolling walker   Balance   Static Sitting Fair +   Dynamic Sitting Fair   Static Standing Fair -   Dynamic Standing Poor +   Activity Tolerance   Activity Tolerance Patient limited by fatigue;Treatment limited secondary to medical complications (Comment)  (SOB/FISHMAN)   RUE Assessment   RUE Assessment WFL  (~110 shoulder flex, 3+/5 MMT)   LUE Assessment   LUE Assessment WFL  (~110 shoulder flex, 3+/5 MMT)   Hand Function   Gross Motor Coordination Functional   Fine Motor Coordination Functional   Sensation   Light Touch No apparent deficits   Vision-Basic Assessment   Current Vision Wears glasses all the time   Cognition   Overall Cognitive Status [facility]   Arousal/Participation Alert; Responsive; Cooperative   Attention Within functional limits   Orientation Level Oriented to person;Oriented to place;Oriented to time;Oriented to situation   Memory Within functional limits   Following Commands Follows all commands and directions without difficulty   Assessment   Limitation Decreased ADL status; Decreased UE strength;Decreased endurance;Decreased self-care trans;Decreased high-level ADLs   Prognosis Good   Assessment Patient is a 69 y o  female seen for OT evaluation s/p admit to [address] [facility]  on 4/30/2023 w/Chronic obstructive pulmonary disease with acute exacerbation ([facility] [address] )  Commorbidities affecting patient's functional performance at time of assessment include: HTN, tobacco abuse, chronic respiratory failure c hypoxia, CAD, MDD, and malnutrition  Orders placed for OT evaluation and treatment and up and OOB as tolerated    Performed at least two patient identifiers during session including name and wristband  Prior to admission, Patient reporting being independent with ADLs, ambulatory with RW, and lives with son  Personal factors affecting patient at time of initial evaluation include: steps to enter, difficulty performing ADLs and difficulty performing IADLs  Upon evaluation, patient requires supervision and minimal  assist for UB ADLs, minimal  and moderate assist for LB ADLs, transfers and functional ambulation in room and bathroom with minimal  assist, with the use of Rolling Walker  Patient is oriented x 4  Occupational performance is affected by the following deficits: decreased functional reach, limited active ROM, decreased muscle strength, dynamic sit/ stand balance deficit with poor standing tolerance time for self care and functional mobility, decreased activity tolerance and delayed righting and equilibrium reactions  Patient to benefit from continued Occupational Therapy treatment while in the hospital to address deficits as defined above and maximize level of functional independence with ADLs and functional mobility  Occupational Performance areas to address include: grooming , bathing/ shower, dressing, toilet hygiene, transfer to all surfaces and functional ambulation  From OT standpoint, recommendation at time of d/c would be Post acute rehabilitation services  Goals   Patient Goals to get better   Plan   Treatment Interventions ADL retraining;Functional transfer training;UE strengthening/ROM; Endurance training;Patient/family training; Compensatory technique education; Activityengagement;Equipment evaluation/education; Energy conservation   Goal Expiration Date 05/17/23   OT Treatment Day 0   OT Frequency 3-5x/wk   Recommendation   OT Discharge Recommendation Post acute rehabilitation services   Additional Comments  The patient's raw score on the AM-PAC Daily Activity Inpatient Short Form is 17   A raw score of less than 19 suggests the patient may benefit from discharge to post-acute rehabilitation services  Please refer to the recommendation of the Occupational Therapist for safe discharge planning  AM-PAC Daily Activity Inpatient   Lower Body Dressing 2   Bathing 3   Toileting 2   Upper Body Dressing 3   Grooming 3   Eating 4   Daily Activity Raw Score 17   Daily Activity Standardized Score (Calc for Raw Score >=11) 37 26   AM-PAC Applied Cognition Inpatient   Following a Speech/Presentation 4   Understanding Ordinary Conversation 4   Taking Medications 3   Remembering Where Things Are Placed or Put Away 3   Remembering List of 4-5 Errands 3   Taking Care of Complicated Tasks 3   Applied Cognition Raw Score 20   Applied Cognition Standardized Score 41 76   Modified Jones Scale   Modified Dennis Scale 4   End of Consult   Patient Position at End of Consult Supine;Bed/Chair alarm activated; All needs within reach   Nurse Communication Nurse aware of consult     GOALS:    *ADL transfers with (I) for inc'd independence with ADLs/purposeful tasks    *UB ADL with (I) for inc'd independence with self cares    *LB ADL with (I) using AE prn for inc'd independence with self cares    *Toileting with (I) for clothing management and hygiene for return to [facility] with personal care    *Increase stand tolerance x 5  m for inc'd tolerance with standing purposeful tasks    *Participate in 10m UE therex to increase overall stamina/activity tolerance for purposeful tasks    *Bed mobility- (I) for inc'd independence to manage own comfort and initiate EOB & OOB purposeful tasks    *Patient will verbalize 3 safety awareness/ principles to prevent falls in the home setting  *Patient will verbalize and demonstrate use of energy conservation/deep breathing techniques and work simplification skills during functional activities with no verbal cues       *Patient will increase OOB/sitting tolerance to 2-4 hours per day to increase participation in self-care and leisure tasks with no s/s of exertion Frankey Coho, OTD, OTR/L

## 2023-05-02 NOTE — PROGRESS NOTES
"Progress Note - Bernard Torres 69 y o  female MRN: [id number]    Unit/Bed#: -01 Encounter: [id number]        Subjective:     Patient drank a little less than 1/2 of her bowel prep  Per nursing, night shift reported mix of brown stool and bright red blood during prep  Patient was upset that she will need to drink more prep, but agreeable so that she can have a thorough evaluation prior to discharge  ROS: As noted in the HPI, otherwise all others negative  Objective:     Vitals: Blood pressure 130/72, pulse 90, temperature 98 9 °F (37 2 °C), temperature source Oral, resp  rate 18, height 5' 7\" (1 702 m), weight 41 2 kg (90 lb 13 3 oz), SpO2 90 %, not currently breastfeeding  ,Body mass index is 14 23 kg/m²  Intake/Output Summary (Last 24 hours) at 5/2/2023 1019  Last data filed at 5/2/2023 0541  Gross per 24 hour   Intake --   Output 1750 ml   Net -1750 ml       Physical Exam:     General Appearance: Alert and oriented x 3  In no respiratory distress on nasal canula   Lungs: Clear to auscultation bilaterally, no rales or rhonchi  Heart: Regular rate and rhythm, S1, S2 normal, no murmur, click, rub or gallop  Abdomen: Soft, non-tender, non-distended; bowel sounds normal; no masses or no organomegaly  Extremities: No cyanosis, edema    Invasive Devices     Peripheral Intravenous Line  Duration           Peripheral IV 05/01/23 Distal;Dorsal (posterior); Left Forearm 1 day                Lab Results:  Results from last 7 days   Lab Units 05/02/23 0529 05/01/23 0417 04/30/23  0939   WBC Thousand/uL 18 78*   < > 16 25*   HEMOGLOBIN g/dL 9 2*   < > 10 3*   HEMATOCRIT % 30 1*   < > 33 3*   PLATELETS Thousands/uL 194   < > 192   NEUTROS PCT %  --   --  59   LYMPHS PCT %  --   --  34   MONOS PCT %  --   --  7   EOS PCT %  --   --  0    < > = values in this interval not displayed       Results from last 7 days   Lab Units 05/02/23 0529 05/01/23 0417 04/30/23  0939   POTASSIUM mmol/L 3 3*   < > 4 2   CHLORIDE " mmol/L 93*   < > 88*   CO2 mmol/L 44*   < > 44*   BUN mg/dL 18   < > 18   CREATININE mg/dL 0 69   < > 0 64   CALCIUM mg/dL 9 4   < > 10 0   ALK PHOS U/L  --   --  74   ALT U/L  --   --  7   AST U/L  --   --  16    < > = values in this interval not displayed  Results from last 7 days   Lab Units 04/30/23  0939   INR  1 04           Imaging Studies: I have personally reviewed pertinent imaging studies  XR chest 1 view portable    Result Date: 5/1/2023  Impression: No radiographic evidence of acute intrathoracic process  Workstation performed: [id number]     PE Study with CT Abdomen and Pelvis with contrast    Result Date: 4/30/2023  Impression: 1  No pulmonary embolus  2   Emphysema  3   Chronic bronchitis  4   Tree-in-bud opacities throughout both lungs, most likely due to bronchiolitis  5   No evidence of acute abnormality in the abdomen or pelvis  Workstation performed: [id number]         Assessment and Plan:     1) Rectal bleeding, history of precancerous colon polyps - Patient with anemis at baseline  9 0 this morning  CT on admission showing no obvious bowel wall thickening  Lactic WNL  Bleeding may be hemorrhoidal, colon polyp, AVM, diverticular, less likely ischemic colitis, etc    - Prep with Miralax and ginger ale today per patient preference as she had a very difficult time with Golytely, attempt colonoscopy tomorrow  Monitor for lower extremity edema or worsening SOB    - Anusol rectal cream BID  - Continue to monitor hemoglobin closely and transfuse as neccessary with a goal hemoglobin above 7-8  - Please notify GI if patient has brisk GI bleeding for consideration of sooner evaluation

## 2023-05-02 NOTE — CASE MANAGEMENT
Case Management Discharge Planning Note    Patient name Hebert Hicks  Location [address] 210/-86 MRN [id number]  : 1945 Date 2023       Current Admission Date: 2023  Current Admission Diagnosis:Chronic obstructive pulmonary disease with acute exacerbation [facility])   Patient Active Problem List    Diagnosis Date Noted    Moderate protein-calorie malnutrition ([address] ) 2023    BRBPR (bright red blood per rectum) 2023    Generalized weakness 2023    Staring episodes 2023    Waldenstrom macroglobulinemia ([facility] ) 2023    Severe protein-calorie malnutrition ([facility] ) 2021    Positive blood culture 2021    Chronic obstructive pulmonary disease with acute exacerbation ([facility] ) 2021    SIRS (systemic inflammatory response syndrome) ([facility] [address] ) 2021    Anemia 2021    Abnormal computed tomography angiography (CTA) 2021    Fatigue 2021    Major depressive disorder, recurrent episode, moderate ([address] ) 2019    Flank pain 2019    Pneumonia 2019    Sepsis ([facility] ) 2019    CAD (coronary artery disease), native coronary artery 2018    Chronic respiratory failure with hypoxia ([facility] ) 2018    Ambulatory dysfunction 2018    Hypokalemia 2018    Essential hypertension 2018    Tobacco abuse 2018      LOS (days): 1  Geometric Mean LOS (GMLOS) (days): 2 70  Days to GMLOS:1 9     OBJECTIVE:  Risk of Unplanned Readmission Score: 26 96         Current admission status: Inpatient   Preferred Pharmacy:   [facility]/pharmacy #[id number] Mcclain - [address]  Phone: [phone] Fax: [phone]    [facility] [address]  [address] [place] [address]  Phone: [phone] Fax: [phone]    Primary Care Provider: Ayo Snidre MD    Primary Insurance: MEDICARE  Secondary Insurance:     DISCHARGE DETAILS:    Discharge planning discussed with[de-identified] Patient's Son  Freedom of Choice: Yes  Comments - Freedom of Choice: CM called patient's son to inform him that [facility] can accept patient back once medically stable  patient's son reported that he has also been in touch with their nursing staff at the facility and was already aware, but he thanked CM for the update  CM contacted family/caregiver?: Yes  Were Treatment Team discharge recommendations reviewed with patient/caregiver?: Yes  Did patient/caregiver verbalize understanding of patient care needs?: Yes  Were patient/caregiver advised of the risks associated with not following Treatment Team discharge recommendations?: Yes    Contacts  Patient Contacts: Salena Craig  Relationship to Patient[de-identified] Family  Contact Method: Phone  Phone Number: [phone]  Reason/Outcome: Continuity of Care, Discharge Planning    [facility]         Is the patient interested in [facility] at discharge?: No    DME Referral Provided  Referral made for DME?: No    Other Referral/Resources/Interventions Provided:  Interventions: Short Term Rehab  Referral Comments: CM sent a message to [facility] in [place] reporting that patient's colonoscopy has been rescheduled for tomorrow, and she is anticipated to be discharged after that  Would you like to participate in our [address] service program?  : No - Declined    Treatment Team Recommendation: Short Term Rehab  Discharge Destination Plan[de-identified] Short Term Rehab  Transport at Discharge : [facility]  Dispatcher Contacted: No     IMM Given (Date):: 05/02/23  IMM Given to[de-identified] Family (CM reviewed IMM with patient's son via phone  Son reported understanding of these rights and denied any questions or concerns at this time   IMM placed in medical records )    Accepting Facility Name, [address] : [address]  Receiving Facility/Agency Phone Number: [phone]  Facility/Agency Fax Number: [phone]

## 2023-05-02 NOTE — PLAN OF CARE
Problem: Potential for Falls  Goal: Patient will remain free of falls  Description: INTERVENTIONS:  - Educate patient/family on patient safety including physical limitations  - Instruct patient to call for assistance with activity   - Consult OT/PT to assist with strengthening/mobility   - Keep Call bell within reach  - Keep bed low and locked with side rails adjusted as appropriate  - Keep care items and personal belongings within reach  - Initiate and maintain comfort rounds  - Make Fall Risk Sign visible to staff  - Offer Toileting every 2 Hours, in advance of need  - Initiate/Maintain bed alarm  - Obtain necessary fall risk management equipment:   - Apply yellow socks and bracelet for high fall risk patients  - Consider moving patient to room near nurses station  Outcome: Progressing     Problem: PAIN - ADULT  Goal: Verbalizes/displays adequate comfort level or baseline comfort level  Description: Interventions:  - Encourage patient to monitor pain and request assistance  - Assess pain using appropriate pain scale  - Administer analgesics based on type and severity of pain and evaluate response  - Implement non-pharmacological measures as appropriate and evaluate response  - Consider cultural and social influences on pain and pain management  - Notify physician/advanced practitioner if interventions unsuccessful or patient reports new pain  Outcome: Progressing     Problem: INFECTION - ADULT  Goal: Absence or prevention of progression during hospitalization  Description: INTERVENTIONS:  - Assess and monitor for signs and symptoms of infection  - Monitor lab/diagnostic results  - Monitor all insertion sites, i e  indwelling lines, tubes, and drains  - Monitor endotracheal if appropriate and nasal secretions for changes in amount and color  - [place] appropriate cooling/warming therapies per order  - Administer medications as ordered  - Instruct and encourage patient and family to use good hand hygiene technique  - Identify and instruct in appropriate isolation precautions for identified infection/condition  Outcome: Progressing  Goal: Absence of fever/infection during neutropenic period  Description: INTERVENTIONS:  - Monitor WBC    Outcome: Progressing     Problem: SAFETY ADULT  Goal: Patient will remain free of falls  Description: INTERVENTIONS:  - Educate patient/family on patient safety including physical limitations  - Instruct patient to call for assistance with activity   - Consult OT/PT to assist with strengthening/mobility   - Keep Call bell within reach  - Keep bed low and locked with side rails adjusted as appropriate  - Keep care items and personal belongings within reach  - Initiate and maintain comfort rounds  - Make Fall Risk Sign visible to staff  - Offer Toileting every 2 Hours, in advance of need  - Initiate/Maintain bed alarm  - Obtain necessary fall risk management equipment:   - Apply yellow socks and bracelet for high fall risk patients  - Consider moving patient to room near nurses station  Outcome: Progressing  Goal: Maintain or return to baseline ADL function  Description: INTERVENTIONS:  -  Assess patient's ability to carry out ADLs; assess patient's baseline for ADL function and identify physical deficits which impact ability to perform ADLs (bathing, care of mouth/teeth, toileting, grooming, dressing, etc )  - Assess/evaluate cause of self-care deficits   - Assess range of motion  - Assess patient's mobility; develop plan if impaired  - Assess patient's need for assistive devices and provide as appropriate  - Encourage maximum independence but intervene and supervise when necessary  - Involve family in performance of ADLs  - Assess for home care needs following discharge   - Consider OT consult to assist with ADL evaluation and planning for discharge  - Provide patient education as appropriate  Outcome: Progressing  Goal: Maintains/Returns to pre admission functional level  Description: INTERVENTIONS:  - Perform BMAT or MOVE assessment daily    - Set and communicate daily mobility goal to care team and patient/family/caregiver  - Collaborate with rehabilitation services on mobility goals if consulted  - Perform Range of Motion 3 times a day  - Reposition patient every 2 hours  - Dangle patient 3 times a day  - Stand patient 3 times a day  - Ambulate patient 3 times a day  - Out of bed to chair 3 times a day   - Out of bed for meals 3 times a day  - Out of bed for toileting  - Record patient progress and toleration of activity level   Outcome: Progressing     Problem: DISCHARGE PLANNING  Goal: Discharge to home or other facility with appropriate resources  Description: INTERVENTIONS:  - Identify barriers to discharge w/patient and caregiver  - Arrange for needed discharge resources and transportation as appropriate  - Identify discharge learning needs (meds, wound care, etc )  - Arrange for interpretive services to assist at discharge as needed  - Refer to Case Management Department for coordinating discharge planning if the patient needs post-hospital services based on physician/advanced practitioner order or complex needs related to functional status, cognitive ability, or social support system  Outcome: Progressing     Problem: Knowledge Deficit  Goal: Patient/family/caregiver demonstrates understanding of disease process, treatment plan, medications, and discharge instructions  Description: Complete learning assessment and assess knowledge base    Interventions:  - Provide teaching at level of understanding  - Provide teaching via preferred learning methods  Outcome: Progressing     Problem: MOBILITY - ADULT  Goal: Maintain or return to baseline ADL function  Description: INTERVENTIONS:  -  Assess patient's ability to carry out ADLs; assess patient's baseline for ADL function and identify physical deficits which impact ability to perform ADLs (bathing, care of mouth/teeth, toileting, grooming, dressing, etc )  - Assess/evaluate cause of self-care deficits   - Assess range of motion  - Assess patient's mobility; develop plan if impaired  - Assess patient's need for assistive devices and provide as appropriate  - Encourage maximum independence but intervene and supervise when necessary  - Involve family in performance of ADLs  - Assess for home care needs following discharge   - Consider OT consult to assist with ADL evaluation and planning for discharge  - Provide patient education as appropriate  Outcome: Progressing  Goal: Maintains/Returns to pre admission functional level  Description: INTERVENTIONS:  - Perform BMAT or MOVE assessment daily    - Set and communicate daily mobility goal to care team and patient/family/caregiver  - Collaborate with rehabilitation services on mobility goals if consulted  - Perform Range of Motion 3 times a day  - Reposition patient every 2 hours    - Dangle patient 3 times a day  - Stand patient 3 times a day  - Ambulate patient 3 times a day  - Out of bed to chair 3 times a day   - Out of bed for meals 3 times a day  - Out of bed for toileting  - Record patient progress and toleration of activity level   Outcome: Progressing     Problem: Prexisting or High Potential for Compromised Skin Integrity  Goal: Skin integrity is maintained or improved  Description: INTERVENTIONS:  - Identify patients at risk for skin breakdown  - Assess and monitor skin integrity  - Assess and monitor nutrition and hydration status  - Monitor labs   - Assess for incontinence   - Turn and reposition patient  - Assist with mobility/ambulation  - Relieve pressure over bony prominences  - Avoid friction and shearing  - Provide appropriate hygiene as needed including keeping skin clean and dry  - Evaluate need for skin moisturizer/barrier cream  - Collaborate with interdisciplinary team   - Patient/family teaching  - Consider wound care consult   Outcome: Progressing Problem: Nutrition/Hydration-ADULT  Goal: Nutrient/Hydration intake appropriate for improving, restoring or maintaining nutritional needs  Description: Monitor and assess patient's nutrition/hydration status for malnutrition  Collaborate with interdisciplinary team and initiate plan and interventions as ordered  Monitor patient's weight and dietary intake as ordered or per policy  Utilize nutrition screening tool and intervene as necessary  Determine patient's food preferences and provide high-protein, high-caloric foods as appropriate       INTERVENTIONS:  - Monitor oral intake, urinary output, labs, and treatment plans  - Assess nutrition and hydration status and recommend course of action  - Evaluate amount of meals eaten  - Assist patient with eating if necessary   - Allow adequate time for meals  - Recommend/ encourage appropriate diets, oral nutritional supplements, and vitamin/mineral supplements  - Order, calculate, and assess calorie counts as needed  - Recommend, monitor, and adjust tube feedings and TPN/PPN based on assessed needs  - Assess need for intravenous fluids  - Provide specific nutrition/hydration education as appropriate  - Include patient/family/caregiver in decisions related to nutrition  Outcome: Progressing

## 2023-05-02 NOTE — ASSESSMENT & PLAN NOTE
Malnutrition Findings:   Adult Malnutrition type: Chronic illness  Adult Degree of Malnutrition: Malnutrition of moderate degree  Malnutrition Characteristics: Muscle loss, Fat loss                  360 Statement: Related to catabolic illness as evidenced by mild depletion of body fat (Orbitals) and mild depletion of muscle mass (Temples) treated with diet and oral nutrition supplements  BMI Findings: Body mass index is 14 23 kg/m²

## 2023-05-02 NOTE — PROGRESS NOTES
[facility]  Progress Note  Name: Kate Ayala  MRN: [id number]  Unit/Bed#: -01 I Date of Admission: 4/30/2023   Date of Service: 5/2/2023 I Hospital Day: 1    Assessment/Plan   * Chronic obstructive pulmonary disease with acute exacerbation ([facility] )  Assessment & Plan  · Patient coming in secondary to worsening shortness of breath over the past couple days  · Patient received nebulizer and steroids in the emergency department  · CTA chest abdomen pelvis: no pulmonary embolism, emphysema, chronic bronchitis, tree-in-bud opacities throughout both lungs  · Slight leukocytosis  · Will hold off on any antibiotics for possible pneumonia given negative imaging findings  · 5/1: Procalcitonin <0 05  · Continue IV steroids  · Nebulizers as needed  · Respiratory protocol  · Continue to monitor    Chronic respiratory failure with hypoxia (HCC)  Assessment & Plan  · Chronically on 3-4 L nasal cannula  · Currently on 3 L nasal cannula    Anemia  Assessment & Plan  · Patient with baseline anemia around 8-9  · Hemoglobin currently 9 2  · May be hemoconcentrated  · Continue to monitor    CAD (coronary artery disease), native coronary artery  Assessment & Plan  · Continue home medications    Essential hypertension  Assessment & Plan  · Blood pressure adequately controlled at this time  · Continue home blood pressure medication  · Continue to monitor    Tobacco abuse  Assessment & Plan  · Discussed importance of smoking cessation for greater than 3 minutes  · Nicotine patch ordered    Major depressive disorder, recurrent episode, moderate (HCC)  Assessment & Plan  · Continue home medications Wellbutrin and viibryd    Moderate protein-calorie malnutrition ([facility] )  Assessment & Plan  Malnutrition Findings:   Adult Malnutrition type: Chronic illness  Adult Degree of Malnutrition: Malnutrition of moderate degree  Malnutrition Characteristics: Muscle loss, Fat loss                  360 Statement: Related to catabolic illness as evidenced by mild depletion of body fat (Orbitals) and mild depletion of muscle mass (Temples) treated with diet and oral nutrition supplements  BMI Findings: Body mass index is 14 23 kg/m²  BRBPR (bright red blood per rectum)  Assessment & Plan  · Nursing report 2 episodes of BRBPR over the course of last night  · This is patient's first occurrence and reports not having history   · GI consulted:   · Colonoscopy scheduled for today  · Hemoglobin at baseline, currently 9 2  · We will continue ASA, however stop Lovenox         VTE Pharmacologic Prophylaxis: VTE Score: 3 Moderate Risk (Score 3-4) - Pharmacological DVT Prophylaxis Contraindicated  Sequential Compression Devices Ordered  Patient Centered Rounds: I performed bedside rounds with nursing staff today  Discussions with Specialists or Other Care Team Provider: CM, GI    Education and Discussions with Family / Patient: Updated  (son) at bedside  Total Time Spent on Date of Encounter in care of patient: 35 minutes This time was spent on one or more of the following: performing physical exam; counseling and coordination of care; obtaining or reviewing history; documenting in the medical record; reviewing/ordering tests, medications or procedures; communicating with other healthcare professionals and discussing with patient's family/caregivers  Current Length of Stay: 1 day(s)  Current Patient Status: Inpatient   Certification Statement: The patient will continue to require additional inpatient hospital stay due to colonoscopy scheduled tomorrow  Discharge Plan: Anticipate discharge in 24-48 hrs to discharge location to be determined pending rehab evaluations  Code Status: Level 3 - DNAR and DNI    Subjective:   Patient seen awake laying in hospital bed  Reports cough and nausea  Currently denies chest pain/pressure, SOB, fever, chills, vomiting, and diarrhea   Patient informed colonoscopy will be performed tomorrow due to incompletion of bowel prep today  Objective:     Vitals:   Temp (24hrs), Av 3 °F (36 8 °C), Min:97 7 °F (36 5 °C), Max:98 9 °F (37 2 °C)    Temp:  [97 7 °F (36 5 °C)-98 9 °F (37 2 °C)] 98 9 °F (37 2 °C)  HR:  [] 90  Resp:  [18-20] 18  BP: (121-141)/(63-77) 130/72  SpO2:  [90 %-98 %] 90 %  Body mass index is 14 23 kg/m²  Input and Output Summary (last 24 hours): Intake/Output Summary (Last 24 hours) at 2023 1100  Last data filed at 2023 1001  Gross per 24 hour   Intake --   Output 1800 ml   Net -1800 ml       Physical Exam:   Physical Exam  Vitals and nursing note reviewed  Constitutional:       General: She is not in acute distress  Appearance: She is underweight  She is not ill-appearing or toxic-appearing  HENT:      Head: Normocephalic  Nose: Nose normal       Mouth/Throat:      Mouth: Mucous membranes are moist       Pharynx: Oropharynx is clear  Eyes:      General: No scleral icterus  Conjunctiva/sclera: Conjunctivae normal       Pupils: Pupils are equal, round, and reactive to light  Cardiovascular:      Rate and Rhythm: Normal rate and regular rhythm  Pulses: Normal pulses  Heart sounds: Normal heart sounds  No murmur heard  No friction rub  No gallop  Pulmonary:      Effort: Pulmonary effort is normal  No respiratory distress  Breath sounds: No stridor  Wheezing present  No rhonchi or rales  Abdominal:      General: Abdomen is flat  Bowel sounds are normal       Palpations: Abdomen is soft  Musculoskeletal:         General: Normal range of motion  Cervical back: Normal range of motion and neck supple  Right lower leg: No edema  Left lower leg: No edema  Lymphadenopathy:      Cervical: No cervical adenopathy  Skin:     General: Skin is warm and dry  Coloration: Skin is not jaundiced or pale  Findings: No bruising, erythema or lesion     Neurological:      General: No focal deficit present  Mental Status: She is alert and oriented to person, place, and time  Mental status is at baseline  Cranial Nerves: No cranial nerve deficit  Motor: No weakness  Psychiatric:      Comments: Flat affect     Additional Data:     Labs:  Results from last 7 days   Lab Units 05/02/23  0529 05/01/23 0417 04/30/23  0939   WBC Thousand/uL 18 78*   < > 16 25*   HEMOGLOBIN g/dL 9 2*   < > 10 3*   HEMATOCRIT % 30 1*   < > 33 3*   PLATELETS Thousands/uL 194   < > 192   NEUTROS PCT %  --   --  59   LYMPHS PCT %  --   --  34   MONOS PCT %  --   --  7   EOS PCT %  --   --  0    < > = values in this interval not displayed  Results from last 7 days   Lab Units 05/02/23 0529 05/01/23 0417 04/30/23  0939   SODIUM mmol/L 141   < > 134*   POTASSIUM mmol/L 3 3*   < > 4 2   CHLORIDE mmol/L 93*   < > 88*   CO2 mmol/L 44*   < > 44*   BUN mg/dL 18   < > 18   CREATININE mg/dL 0 69   < > 0 64   ANION GAP mmol/L 4   < > 2*   CALCIUM mg/dL 9 4   < > 10 0   ALBUMIN g/dL  --   --  3 7   TOTAL BILIRUBIN mg/dL  --   --  0 25   ALK PHOS U/L  --   --  74   ALT U/L  --   --  7   AST U/L  --   --  16   GLUCOSE RANDOM mg/dL 99   < > 121    < > = values in this interval not displayed  Results from last 7 days   Lab Units 04/30/23  0939   INR  1 04             Results from last 7 days   Lab Units 05/01/23  1602 05/01/23  1314 05/01/23 0417 04/30/23  1609 04/30/23  0939   LACTIC ACID mmol/L 0 7 2 7*  --   --  0 6   PROCALCITONIN ng/ml  --   --  <0 05 <0 05  --        Lines/Drains:  Invasive Devices     Peripheral Intravenous Line  Duration           Peripheral IV 05/01/23 Distal;Dorsal (posterior); Left Forearm 1 day              Imaging: No pertinent imaging reviewed  Recent Cultures (last 7 days):   Results from last 7 days   Lab Units 04/30/23  0943 04/30/23  0940   BLOOD CULTURE  No Growth at 24 hrs  No Growth at 24 hrs         Last 24 Hours Medication List:   Current Facility-Administered Medications   Medication Dose Route Frequency Provider Last Rate    acetaminophen  650 mg Oral Q6H PRN [facility], DO      aspirin  81 mg Oral Daily [facility], DO      benzonatate  100 mg Oral TID PRN [facility], DO      budesonide-formoterol  2 puff Inhalation BID [facility], DO      buPROPion  75 mg Oral Daily [facility], DO      carvedilol  25 mg Oral BID With Meals [facility], DO      ferrous sulfate  325 mg Oral Daily With Breakfast [facility], DO      furosemide  20 mg Oral Daily [facility], DO      guaiFENesin  600 mg Oral BID [facility], DO      hydrochlorothiazide  25 mg Oral Daily [facility], DO      HYDROcodone-acetaminophen  1 tablet Oral Q6H PRN [facility],       hydrocortisone   Topical BID Judeth Mohs, PA-C      hydrOXYzine HCL  25 mg Oral Q6H PRN [place] Jose Olivo PA-C      ipratropium  0 5 mg Nebulization TID [facility], DO      levalbuterol  1 25 mg Nebulization TID [facility], DO      lisinopril  20 mg Oral BID [facility], DO      melatonin  3 mg Oral HS PRN [facility], DO      nicotine  1 patch Transdermal Daily [facility], DO      nitroglycerin  0 4 mg Sublingual Q5 Min PRN [facility], DO      ondansetron  4 mg Intravenous Q6H PRN Kyrie Davila PA-C      predniSONE  40 mg Oral Daily [place] Jose [facility], [place]      vilazodone  40 mg Oral Daily With Breakfast [facility], DO          Today, Patient Was Seen By: Arlyce Libman    **Please Note: This note may have been constructed using a voice recognition system  **

## 2023-05-02 NOTE — RESPIRATORY THERAPY NOTE
RT Protocol Note  Lexi Rubio 69 y o  female MRN: [id number]  Unit/Bed#: -01 Encounter: [id number]    Assessment    Principal Problem:    Chronic obstructive pulmonary disease with acute exacerbation ([place] [address] )  Active Problems:    Essential hypertension    Tobacco abuse    Chronic respiratory failure with hypoxia (HCC)    CAD (coronary artery disease), native coronary artery    Major depressive disorder, recurrent episode, moderate (HCC)    Anemia    BRBPR (bright red blood per rectum)    Moderate protein-calorie malnutrition (HCC)      Home Pulmonary Medications:    Home Devices/Therapy: Home O2    Past Medical History:   Diagnosis Date    Acute congestive heart failure ([place] [address] ) 8/27/2021    Anxiety     Cardiac disease     Chest pain 8/27/2021    Chronic pain disorder     [facility]     Depression     Heart disease     Hyperlipidemia     Hypertension     MI (myocardial infarction) ([place] [address] )     MRSA (methicillin resistant Staphylococcus aureus)     Renal disorder     benign kidney tumor     Social History     Socioeconomic History    Marital status:      Spouse name: None    Number of children: 3    Years of education: 15    Highest education level: High school graduate   Occupational History    Occupation: [employer]     Employer: [employer]     Comment: retired    Tobacco Use    Smoking status: Every Day     Packs/day: 1 00     Years: 60 00     Pack years: 60 00     Types: Cigarettes    Smokeless tobacco: Never    Tobacco comments:     She has close to a 60 pack-year smoking history, most recently has cut down to 4-5 cigarettes daily   Vaping Use    Vaping Use: Never used   Substance and Sexual Activity    Alcohol use: Never    Drug use: No    Sexual activity: Not Currently   Other Topics Concern    None   Social History Narrative    None     Social Determinants of Health     Financial Resource Strain: Not on file   Food "Insecurity: No Food Insecurity    Worried About Running Out of Food in the Last Year: Never true    Ran Out of Food in the Last Year: Never true   Transportation Needs: No Transportation Needs    Lack of Transportation (Medical): No    Lack of Transportation (Non-Medical): No   Physical Activity: Not on file   Stress: Not on file   Social Connections: Not on file   Intimate Partner Violence: Not on file   Housing Stability: Low Risk     Unable to Pay for Housing in the Last Year: No    Number of Places Lived in the Last Year: 2    Unstable Housing in the Last Year: No       Subjective         Objective    Physical Exam:   Assessment Type: Pre-treatment  General Appearance: Alert, Awake  Respiratory Pattern: Normal, Dyspnea with exertion  Chest Assessment: Chest expansion symmetrical  Bilateral Breath Sounds: Diminished  O2 Device: nc    Vitals:  Blood pressure 124/69, pulse 88, temperature 98 3 °F (36 8 °C), temperature source Oral, resp  rate 20, height 5' 7\" (1 702 m), weight 41 2 kg (90 lb 13 3 oz), SpO2 92 %, not currently breastfeeding  Imaging and other studies: I have personally reviewed pertinent reports  O2 Device: nc     Plan    Respiratory Plan: Home Bronchodilator Patient pathway        Resp Comments: pt not in distress  lungs are diminished clear  spo2 on 4L nc 91%  pt wears 4L nc at home and takes breathing tx's TID at home  Continue as ordered       "

## 2023-05-02 NOTE — PLAN OF CARE
Problem: PHYSICAL THERAPY ADULT  Goal: Performs mobility at highest level of function for planned discharge setting  See evaluation for individualized goals  Description: Treatment/Interventions: Functional transfer training, LE strengthening/ROM, Elevations, Therapeutic exercise, Endurance training, Patient/family training, Equipment eval/education, Bed mobility, Gait training, Spoke to nursing, OT  Equipment Recommended: [place] Bath (RW)       See flowsheet documentation for full assessment, interventions and recommendations  5/2/2023 1142 by Faraz Stevens, PT  Note: Prognosis: Good  Problem List: Decreased strength, Decreased endurance, Impaired balance, Decreased mobility  Assessment: Pt is 69 y o  female seen for PT evaluation on 5/2/2023 s/p admit to [facility] on 4/30/2023 w/ Chronic obstructive pulmonary disease with acute exacerbation ([facility] [address] )  PT was consulted to assess pt's functional mobility and d/c needs  Order placed for PT eval and tx  PTA, pt resides with son in [facility] with 1st floor set up 4 ADELINA, ambulates with walker  At time of eval, pt performing bed mobility SBA, transfers min A, short gait trial with RW min A x2  Upon evaluation, pt presenting with impaired functional mobility d/t decreased strength, decreased endurance, impaired balance, decreased mobility, decreased coordination and activity intolerance  Pertinent PMHx and current co-morbidities affecting pt's physical performance at time of assessment include: COPD, HTN, tobacco abuse, chronic respiratory failure with hypoxia, CAD, major depressive disorder, anemia, BRBPR, moderate protein calorie malnutrition, ambulatory dysfunction, hypokalemia, sepsis, SIRS, generalized weakness  Personal factors affecting pt at time of eval include: lives in 2 story house, ambulating w/ assistive device, stairs to enter home and inability to navigate community distances   The following objective measures performed on IE also reveal limitations: Barthel Index: 45/100, Modified [place]: 4 (moderate/severe disability) and AM-PAC 6-Clicks: 05/19  Pt's clinical presentation is currently unstable/unpredictable seen in pt's presentation of abnormal lab value(s), need for input for task focus and mobility technique and ongoing medical assessment  Overall, pt's rehab potential and prognosis to return to PLOF is good as impacted by objective findings, warranting pt to receive further skilled PT interventions to address identified impairments, activity limitation(s), and participation restriction(s)  Pt to benefit from continued PT tx to address deficits as defined above and maximize level of functional independent mobility  From PT/mobility standpoint, recommendation at time of d/c would be post acute rehabilitation services pending progress in order to facilitate return to PLOF  Barriers to Discharge: None     PT Discharge Recommendation: Post acute rehabilitation services    See flowsheet documentation for full assessment  5/2/2023 1142 by Stacy Velazquez, PT  Note: Prognosis: Good  Problem List: Decreased strength, Decreased endurance, Impaired balance, Decreased mobility  Assessment: Pt is 69 y o  female seen for PT evaluation on 5/2/2023 s/p admit to [facility] on 4/30/2023 w/ Chronic obstructive pulmonary disease with acute exacerbation ([facility] [address] )  PT was consulted to assess pt's functional mobility and d/c needs  Order placed for PT eval and tx  PTA, pt resides with son in [facility] with 1st floor set up 4 ADELINA, ambulates with walker  At time of eval, pt performing bed mobility SBA, transfers min A, short gait trial with RW min A x2  Upon evaluation, pt presenting with impaired functional mobility d/t decreased strength, decreased endurance, impaired balance, decreased mobility, decreased coordination and activity intolerance   Pertinent PMHx and current co-morbidities affecting pt's physical performance at time of assessment include: COPD, HTN, tobacco abuse, chronic respiratory failure with hypoxia, CAD, major depressive disorder, anemia, BRBPR, moderate protein calorie malnutrition, ambulatory dysfunction, hypokalemia, sepsis, SIRS, generalized weakness  Personal factors affecting pt at time of eval include: lives in 2 story house, ambulating w/ assistive device, stairs to enter home and inability to navigate community distances  The following objective measures performed on IE also reveal limitations: Barthel Index: 45/100, Modified [place]: 4 (moderate/severe disability) and AM-PAC 6-Clicks: 16/39  Pt's clinical presentation is currently unstable/unpredictable seen in pt's presentation of abnormal lab value(s), need for input for task focus and mobility technique and ongoing medical assessment  Overall, pt's rehab potential and prognosis to return to PLOF is good as impacted by objective findings, warranting pt to receive further skilled PT interventions to address identified impairments, activity limitation(s), and participation restriction(s)  Pt to benefit from continued PT tx to address deficits as defined above and maximize level of functional independent mobility  From PT/mobility standpoint, recommendation at time of d/c would be post acute rehabilitation services pending progress in order to facilitate return to PLOF  Barriers to Discharge: None     PT Discharge Recommendation: Post acute rehabilitation services    See flowsheet documentation for full assessment

## 2023-05-02 NOTE — PHYSICAL THERAPY NOTE
Physical Therapy Evaluation     Patient's Name: Bernard Torres    Admitting Diagnosis  COPD with acute exacerbation ([place] [address] ) [J44 [id number]]    Problem List  Patient Active Problem List   Diagnosis    Ambulatory dysfunction    Hypokalemia    Essential hypertension    Tobacco abuse    Chronic respiratory failure with hypoxia (HCC)    CAD (coronary artery disease), native coronary artery    Flank pain    Pneumonia    Sepsis ([facility] )    Major depressive disorder, recurrent episode, moderate (HCC)    Fatigue    Chronic obstructive pulmonary disease with acute exacerbation (HCC)    SIRS (systemic inflammatory response syndrome) ([facility])    Anemia    Abnormal computed tomography angiography (CTA)    Severe protein-calorie malnutrition (HCC)    Positive blood culture    Waldenstrom macroglobulinemia (HCC)    Generalized weakness    Staring episodes    BRBPR (bright red blood per rectum)    Moderate protein-calorie malnutrition (HCC)       Past Medical History  Past Medical History:   Diagnosis Date    Acute congestive heart failure ([place] [address] ) 8/27/2021    Anxiety     Cardiac disease     Chest pain 8/27/2021    Chronic pain disorder     COPD (chronic obstructive pulmonary disease) ([facility])     Depression     Heart disease     Hyperlipidemia     Hypertension     MI (myocardial infarction) ([place] [address] )     MRSA (methicillin resistant Staphylococcus aureus)     Renal disorder     benign kidney tumor       Past Surgical History  Past Surgical History:   Procedure Laterality Date    APPENDECTOMY      ELBOW BURSA SURGERY Left 02/2018    D/T MRSA INFECTION    SPINAL FUSION      L7 L9        05/02/23 0800   PT Last Visit   PT Visit Date 05/02/23   Note Type   Note type Evaluation   Pain Assessment   Pain Assessment Tool 0-10   Pain Score No Pain   Restrictions/Precautions   Weight Bearing Precautions Per Order No   Braces or Orthoses   (none reported)   Other Precautions Chair Alarm; Bed Alarm;Multiple lines;O2;Fall Risk  (4L O2 NC)   Home Living   Type of Home House   Home Layout Able to live on main level with bedroom/bathroom; Performs ADLs on one level;Stairs to enter with rails; Two level  (4 ADELINA, 1st floor set up)   Bathroom Shower/Tub Walk-in shower   Bathroom Toilet Standard   Bathroom Equipment Shower chair;Grab bars in shower;Grab bars around toilet; Toilet raiser   [facility]; Wheelchair-manual  (RW and rollator)   Additional Comments pt ambulates with walker PTA  wears 4L O2 NC at baseline   Prior Function   Level of [place] Independent with ADLs; Independent with functional mobility; Needs assistance with IADLS   Lives With Russell Puentes Help From Family   IADLs Family/Friend/Other provides transportation; Family/Friend/Other provides meals; Independent with medication management   Falls in the last 6 months 0   Vocational Retired   Comments home set up listed above is son's where she will be returning to post d/c   General   Family/Caregiver Present No   Cognition   Overall Cognitive Status WFL   Arousal/Participation Alert   Orientation Level Oriented to person;Oriented to place;Oriented to time;Oriented to situation   Memory Within functional limits   Following Commands Follows all commands and directions without difficulty   Comments pt agreeable to PT evaluation   RLE Assessment   RLE Assessment   (grossly 3+/5)   LLE Assessment   LLE Assessment   (grossly 3+/5)   Vision-Basic Assessment   Current Vision Wears glasses all the time   Coordination   Movements are Fluid and Coordinated 0  (generalized shakiness)   Sensation WFL   Bed Mobility   Supine to Sit 5  Supervision   Additional items Assist x 1;HOB elevated; Bedrails; Increased time required;Verbal cues   Sit to Supine 5  Supervision   Additional items Assist x 1;HOB elevated; Bedrails; Increased time required;Verbal cues   Additional Comments SpO2 down to 81% on 4L O2 NC during functional mobility, requiring several min for recovery   Transfers   Sit to Stand 4  Minimal assistance   Additional items Assist x 1;Bedrails; Increased time required;Verbal cues   Stand to Sit 4  Minimal assistance   Additional items Assist x 1;Bedrails; Increased time required;Verbal cues   Additional Comments Pt requires verbal cues for safety and proper body mechanics   Ambulation/Elevation   Gait pattern Decreased foot clearance;Shuffling; Short stride; Step to  (unsteady)   Gait Assistance 4  Minimal assist   Additional items Assist x 2; Tactile cues; Verbal cues   Assistive Device Rolling walker   Distance 3'   Balance   Static Sitting Fair +   Dynamic Sitting Fair   Static Standing Fair -   Dynamic Standing Poor +   Ambulatory Poor +   Endurance Deficit   Endurance Deficit Yes   Activity Tolerance   Activity Tolerance Patient limited by fatigue   Medical Staff Made Aware OT Vera Errol   Nurse Made Aware RN Devan Wells   Assessment   Prognosis Good   Problem List Decreased strength;Decreased endurance; Impaired balance;Decreased mobility   Assessment Pt is 69 y o  female seen for PT evaluation on 5/2/2023 s/p admit to [facility] on 4/30/2023 w/ Chronic obstructive pulmonary disease with acute exacerbation ([facility] [address] )  PT was consulted to assess pt's functional mobility and d/c needs  Order placed for PT eval and tx  PTA, pt resides with son in [facility] with 1st floor set up 4 ADELINA, ambulates with walker  At time of eval, pt performing bed mobility SBA, transfers min A, short gait trial with RW min A x2  Upon evaluation, pt presenting with impaired functional mobility d/t decreased strength, decreased endurance, impaired balance, decreased mobility, decreased coordination and activity intolerance   Pertinent PMHx and current co-morbidities affecting pt's physical performance at time of assessment include: COPD, HTN, tobacco abuse, chronic respiratory failure with hypoxia, CAD, major depressive disorder, anemia, BRBPR, moderate protein calorie malnutrition, ambulatory dysfunction, hypokalemia, sepsis, SIRS, generalized weakness  Personal factors affecting pt at time of eval include: lives in 2 story house, ambulating w/ assistive device, stairs to enter home and inability to navigate community distances  The following objective measures performed on IE also reveal limitations: Barthel Index: 45/100, Modified [place]: 4 (moderate/severe disability) and AM-PAC 6-Clicks: 05/75  Pt's clinical presentation is currently unstable/unpredictable seen in pt's presentation of abnormal lab value(s), need for input for task focus and mobility technique and ongoing medical assessment  Overall, pt's rehab potential and prognosis to return to PLOF is good as impacted by objective findings, warranting pt to receive further skilled PT interventions to address identified impairments, activity limitation(s), and participation restriction(s)  Pt to benefit from continued PT tx to address deficits as defined above and maximize level of functional independent mobility  From PT/mobility standpoint, recommendation at time of d/c would be post acute rehabilitation services pending progress in order to facilitate return to PLOF     Barriers to Discharge None   Goals   Patient Goals to get test   STG Expiration Date 05/12/23   Short Term Goal #1 In 7-10 days: Increase bilateral LE strength 1/2 grade to facilitate independent mobility, Perform all bed mobility tasks modified independent to decrease caregiver burden, Perform all transfers modified independent to improve independence, Ambulate > 50 ft  with least restrictive assistive device with distant S w/o LOB and w/ normalized gait pattern 100% of the time, Navigate 4 stairs with SBA with unilateral handrail to facilitate return to previous living environment, Increase all balance 1/2 grade to decrease risk for falls, Improve Barthel Index score to 60 or greater to facilitate independence and PT provider will perform functional balance assessment to determine fall risk   PT Treatment Day 0   Plan Treatment/Interventions Functional transfer training;LE strengthening/ROM; Elevations; Therapeutic exercise; Endurance training;Patient/family training;Equipment eval/education; Bed mobility;Gait training;Spoke to nursing;OT   PT Frequency 3-5x/wk   Recommendation   PT Discharge Recommendation Post acute rehabilitation services   Equipment Recommended Walker  (RW)   AM-PAC Basic Mobility Inpatient   Turning in Flat Bed Without Bedrails 3   Lying on Back to Sitting on Edge of Flat Bed Without Bedrails 3   Moving Bed to Chair 2   Standing Up From Chair Using Arms 3   Walk in Room 2   Climb 3-5 Stairs With Railing 2   Basic Mobility Inpatient Raw Score 15   Basic Mobility Standardized Score 36 97   Highest Level Of Mobility   -[facility] Goal 4: Move to chair/commode   -[facility] Achieved 5: Stand (1 or more minutes)   Modified [place] Scale   Modified [place] Scale 4   Barthel Index   Feeding 5  (currently NPO)   Bathing 0   Grooming Score 0   Dressing Score 5   Bladder Score 10   Bowels Score 10   Toilet Use Score 5   Transfers (Bed/Chair) Score 10   Mobility (Level Surface) Score 0   Stairs Score 0   Barthel Index Score 45           Kinza Gonzalez, PT, DPT

## 2023-05-03 ENCOUNTER — APPOINTMENT (INPATIENT)
Dept: RADIOLOGY | Facility: HOSPITAL | Age: 78
End: 2023-05-03

## 2023-05-03 LAB
ANION GAP SERPL CALCULATED.3IONS-SCNC: 6 MMOL/L (ref 4–13)
BASE EX.OXY STD BLDV CALC-SCNC: 86.3 % (ref 60–80)
BASE EXCESS BLDV CALC-SCNC: 18.6 MMOL/L
BUN SERPL-MCNC: 19 MG/DL (ref 5–25)
CALCIUM SERPL-MCNC: 9.7 MG/DL (ref 8.4–10.2)
CHLORIDE SERPL-SCNC: 90 MMOL/L (ref 96–108)
CO2 SERPL-SCNC: 43 MMOL/L (ref 21–32)
CREAT SERPL-MCNC: 0.65 MG/DL (ref 0.6–1.3)
ERYTHROCYTE [DISTWIDTH] IN BLOOD BY AUTOMATED COUNT: 15.9 % (ref 11.6–15.1)
GFR SERPL CREATININE-BSD FRML MDRD: 86 ML/MIN/1.73SQ M
GLUCOSE SERPL-MCNC: 90 MG/DL (ref 65–140)
HCO3 BLDV-SCNC: 43.6 MMOL/L (ref 24–30)
HCT VFR BLD AUTO: 32.8 % (ref 34.8–46.1)
HGB BLD-MCNC: 10.2 G/DL (ref 11.5–15.4)
MCH RBC QN AUTO: 29.3 PG (ref 26.8–34.3)
MCHC RBC AUTO-ENTMCNC: 31.1 G/DL (ref 31.4–37.4)
MCV RBC AUTO: 94 FL (ref 82–98)
O2 CT BLDV-SCNC: 14.3 ML/DL
PCO2 BLDV: 51.9 MM HG (ref 42–50)
PH BLDV: 7.54 [PH] (ref 7.3–7.4)
PLATELET # BLD AUTO: 233 THOUSANDS/UL (ref 149–390)
PMV BLD AUTO: 9.7 FL (ref 8.9–12.7)
PO2 BLDV: 49.5 MM HG (ref 35–45)
POTASSIUM SERPL-SCNC: 2.9 MMOL/L (ref 3.5–5.3)
RBC # BLD AUTO: 3.48 MILLION/UL (ref 3.81–5.12)
SODIUM SERPL-SCNC: 139 MMOL/L (ref 135–147)
WBC # BLD AUTO: 19.4 THOUSAND/UL (ref 4.31–10.16)

## 2023-05-03 RX ORDER — LEVALBUTEROL INHALATION SOLUTION 1.25 MG/3ML
1.25 SOLUTION RESPIRATORY (INHALATION) EVERY 6 HOURS PRN
Status: DISCONTINUED | OUTPATIENT
Start: 2023-05-03 | End: 2023-05-05 | Stop reason: HOSPADM

## 2023-05-03 RX ORDER — POTASSIUM CHLORIDE 14.9 MG/ML
20 INJECTION INTRAVENOUS
Status: COMPLETED | OUTPATIENT
Start: 2023-05-03 | End: 2023-05-03

## 2023-05-03 RX ADMIN — HYDROCHLOROTHIAZIDE 25 MG: 25 TABLET ORAL at 08:32

## 2023-05-03 RX ADMIN — LEVALBUTEROL HYDROCHLORIDE 1.25 MG: 1.25 SOLUTION RESPIRATORY (INHALATION) at 05:37

## 2023-05-03 RX ADMIN — GUAIFENESIN 600 MG: 600 TABLET ORAL at 17:07

## 2023-05-03 RX ADMIN — BUDESONIDE AND FORMOTEROL FUMARATE DIHYDRATE 2 PUFF: 160; 4.5 AEROSOL RESPIRATORY (INHALATION) at 08:32

## 2023-05-03 RX ADMIN — IPRATROPIUM BROMIDE 0.5 MG: 0.5 SOLUTION RESPIRATORY (INHALATION) at 19:58

## 2023-05-03 RX ADMIN — SODIUM CHLORIDE 3 G: 900 INJECTION, SOLUTION INTRAVENOUS at 22:31

## 2023-05-03 RX ADMIN — GUAIFENESIN 600 MG: 600 TABLET ORAL at 08:32

## 2023-05-03 RX ADMIN — POTASSIUM CHLORIDE 20 MEQ: 14.9 INJECTION, SOLUTION INTRAVENOUS at 08:26

## 2023-05-03 RX ADMIN — VILAZODONE HYDROCHLORIDE 40 MG: 20 TABLET ORAL at 08:32

## 2023-05-03 RX ADMIN — BUPROPION HYDROCHLORIDE TABLETS 75 MG: 75 TABLET, FILM COATED ORAL at 08:31

## 2023-05-03 RX ADMIN — ASPIRIN 81 MG: 81 TABLET, COATED ORAL at 08:31

## 2023-05-03 RX ADMIN — LEVALBUTEROL HYDROCHLORIDE 1.25 MG: 1.25 SOLUTION RESPIRATORY (INHALATION) at 19:58

## 2023-05-03 RX ADMIN — IPRATROPIUM BROMIDE 0.5 MG: 0.5 SOLUTION RESPIRATORY (INHALATION) at 07:13

## 2023-05-03 RX ADMIN — IPRATROPIUM BROMIDE 0.5 MG: 0.5 SOLUTION RESPIRATORY (INHALATION) at 05:37

## 2023-05-03 RX ADMIN — CARVEDILOL 25 MG: 12.5 TABLET, FILM COATED ORAL at 08:31

## 2023-05-03 RX ADMIN — BENZONATATE 100 MG: 100 CAPSULE ORAL at 14:32

## 2023-05-03 RX ADMIN — FUROSEMIDE 20 MG: 20 TABLET ORAL at 08:32

## 2023-05-03 RX ADMIN — BUDESONIDE AND FORMOTEROL FUMARATE DIHYDRATE 2 PUFF: 160; 4.5 AEROSOL RESPIRATORY (INHALATION) at 17:09

## 2023-05-03 RX ADMIN — HYDROCODONE BITARTRATE AND ACETAMINOPHEN 1 TABLET: 5; 325 TABLET ORAL at 23:25

## 2023-05-03 RX ADMIN — POTASSIUM CHLORIDE 20 MEQ: 14.9 INJECTION, SOLUTION INTRAVENOUS at 10:04

## 2023-05-03 RX ADMIN — HYDROCODONE BITARTRATE AND ACETAMINOPHEN 1 TABLET: 5; 325 TABLET ORAL at 10:13

## 2023-05-03 RX ADMIN — PREDNISONE 40 MG: 20 TABLET ORAL at 08:32

## 2023-05-03 RX ADMIN — POTASSIUM CHLORIDE 20 MEQ: 14.9 INJECTION, SOLUTION INTRAVENOUS at 12:06

## 2023-05-03 RX ADMIN — SODIUM CHLORIDE 3 G: 900 INJECTION, SOLUTION INTRAVENOUS at 17:54

## 2023-05-03 RX ADMIN — LEVALBUTEROL HYDROCHLORIDE 1.25 MG: 1.25 SOLUTION RESPIRATORY (INHALATION) at 07:14

## 2023-05-03 RX ADMIN — LISINOPRIL 20 MG: 20 TABLET ORAL at 17:07

## 2023-05-03 RX ADMIN — LISINOPRIL 20 MG: 20 TABLET ORAL at 08:32

## 2023-05-03 RX ADMIN — FERROUS SULFATE TAB 325 MG (65 MG ELEMENTAL FE) 325 MG: 325 (65 FE) TAB at 08:31

## 2023-05-03 RX ADMIN — IPRATROPIUM BROMIDE 0.5 MG: 0.5 SOLUTION RESPIRATORY (INHALATION) at 13:32

## 2023-05-03 RX ADMIN — SODIUM CHLORIDE 3 G: 900 INJECTION, SOLUTION INTRAVENOUS at 12:55

## 2023-05-03 RX ADMIN — LEVALBUTEROL HYDROCHLORIDE 1.25 MG: 1.25 SOLUTION RESPIRATORY (INHALATION) at 13:32

## 2023-05-03 RX ADMIN — CARVEDILOL 25 MG: 12.5 TABLET, FILM COATED ORAL at 17:07

## 2023-05-03 NOTE — ASSESSMENT & PLAN NOTE
· Patient with baseline anemia around 8-9  · Hemoglobin currently 10 2, improving since admission  · May be hemoconcentrated  · Continue to monitor

## 2023-05-03 NOTE — PLAN OF CARE
Problem: Potential for Falls  Goal: Patient will remain free of falls  Description: INTERVENTIONS:  - Educate patient/family on patient safety including physical limitations  - Instruct patient to call for assistance with activity   - Consult OT/PT to assist with strengthening/mobility   - Keep Call bell within reach  - Keep bed low and locked with side rails adjusted as appropriate  - Keep care items and personal belongings within reach  - Initiate and maintain comfort rounds  - Make Fall Risk Sign visible to staff  - Offer Toileting every 2 Hours, in advance of need  - Initiate/Maintain bed alarm  - Obtain necessary fall risk management equipment:   - Apply yellow socks and bracelet for high fall risk patients  - Consider moving patient to room near nurses station  Outcome: Progressing     Problem: PAIN - ADULT  Goal: Verbalizes/displays adequate comfort level or baseline comfort level  Description: Interventions:  - Encourage patient to monitor pain and request assistance  - Assess pain using appropriate pain scale  - Administer analgesics based on type and severity of pain and evaluate response  - Implement non-pharmacological measures as appropriate and evaluate response  - Consider cultural and social influences on pain and pain management  - Notify physician/advanced practitioner if interventions unsuccessful or patient reports new pain  Outcome: Progressing     Problem: INFECTION - ADULT  Goal: Absence or prevention of progression during hospitalization  Description: INTERVENTIONS:  - Assess and monitor for signs and symptoms of infection  - Monitor lab/diagnostic results  - Monitor all insertion sites, i e  indwelling lines, tubes, and drains  - Monitor endotracheal if appropriate and nasal secretions for changes in amount and color  - [place] appropriate cooling/warming therapies per order  - Administer medications as ordered  - Instruct and encourage patient and family to use good hand hygiene technique  - Identify and instruct in appropriate isolation precautions for identified infection/condition  Outcome: Progressing  Goal: Absence of fever/infection during neutropenic period  Description: INTERVENTIONS:  - Monitor WBC    Outcome: Progressing     Problem: SAFETY ADULT  Goal: Patient will remain free of falls  Description: INTERVENTIONS:  - Educate patient/family on patient safety including physical limitations  - Instruct patient to call for assistance with activity   - Consult OT/PT to assist with strengthening/mobility   - Keep Call bell within reach  - Keep bed low and locked with side rails adjusted as appropriate  - Keep care items and personal belongings within reach  - Initiate and maintain comfort rounds  - Make Fall Risk Sign visible to staff  - Offer Toileting every 2 Hours, in advance of need  - Initiate/Maintain bed alarm  - Obtain necessary fall risk management equipment:   - Apply yellow socks and bracelet for high fall risk patients  - Consider moving patient to room near nurses station  Outcome: Progressing  Goal: Maintain or return to baseline ADL function  Description: INTERVENTIONS:  -  Assess patient's ability to carry out ADLs; assess patient's baseline for ADL function and identify physical deficits which impact ability to perform ADLs (bathing, care of mouth/teeth, toileting, grooming, dressing, etc )  - Assess/evaluate cause of self-care deficits   - Assess range of motion  - Assess patient's mobility; develop plan if impaired  - Assess patient's need for assistive devices and provide as appropriate  - Encourage maximum independence but intervene and supervise when necessary  - Involve family in performance of ADLs  - Assess for home care needs following discharge   - Consider OT consult to assist with ADL evaluation and planning for discharge  - Provide patient education as appropriate  Outcome: Progressing  Goal: Maintains/Returns to pre admission functional level  Description: INTERVENTIONS:  - Perform BMAT or MOVE assessment daily    - Set and communicate daily mobility goal to care team and patient/family/caregiver  - Collaborate with rehabilitation services on mobility goals if consulted  - Perform Range of Motion 3 times a day  - Reposition patient every 2 hours  - Dangle patient 3 times a day  - Stand patient 3 times a day  - Ambulate patient 3 times a day  - Out of bed to chair 3 times a day   - Out of bed for meals 3 times a day  - Out of bed for toileting  - Record patient progress and toleration of activity level   Outcome: Progressing     Problem: DISCHARGE PLANNING  Goal: Discharge to home or other facility with appropriate resources  Description: INTERVENTIONS:  - Identify barriers to discharge w/patient and caregiver  - Arrange for needed discharge resources and transportation as appropriate  - Identify discharge learning needs (meds, wound care, etc )  - Arrange for interpretive services to assist at discharge as needed  - Refer to Case Management Department for coordinating discharge planning if the patient needs post-hospital services based on physician/advanced practitioner order or complex needs related to functional status, cognitive ability, or social support system  Outcome: Progressing     Problem: Knowledge Deficit  Goal: Patient/family/caregiver demonstrates understanding of disease process, treatment plan, medications, and discharge instructions  Description: Complete learning assessment and assess knowledge base    Interventions:  - Provide teaching at level of understanding  - Provide teaching via preferred learning methods  Outcome: Progressing     Problem: MOBILITY - ADULT  Goal: Maintain or return to baseline ADL function  Description: INTERVENTIONS:  -  Assess patient's ability to carry out ADLs; assess patient's baseline for ADL function and identify physical deficits which impact ability to perform ADLs (bathing, care of mouth/teeth, toileting, grooming, dressing, etc )  - Assess/evaluate cause of self-care deficits   - Assess range of motion  - Assess patient's mobility; develop plan if impaired  - Assess patient's need for assistive devices and provide as appropriate  - Encourage maximum independence but intervene and supervise when necessary  - Involve family in performance of ADLs  - Assess for home care needs following discharge   - Consider OT consult to assist with ADL evaluation and planning for discharge  - Provide patient education as appropriate  Outcome: Progressing  Goal: Maintains/Returns to pre admission functional level  Description: INTERVENTIONS:  - Perform BMAT or MOVE assessment daily    - Set and communicate daily mobility goal to care team and patient/family/caregiver  - Collaborate with rehabilitation services on mobility goals if consulted  - Perform Range of Motion 3 times a day  - Reposition patient every 2 hours    - Dangle patient 3 times a day  - Stand patient 3 times a day  - Ambulate patient 3 times a day  - Out of bed to chair 3 times a day   - Out of bed for meals 3 times a day  - Out of bed for toileting  - Record patient progress and toleration of activity level   Outcome: Progressing     Problem: Prexisting or High Potential for Compromised Skin Integrity  Goal: Skin integrity is maintained or improved  Description: INTERVENTIONS:  - Identify patients at risk for skin breakdown  - Assess and monitor skin integrity  - Assess and monitor nutrition and hydration status  - Monitor labs   - Assess for incontinence   - Turn and reposition patient  - Assist with mobility/ambulation  - Relieve pressure over bony prominences  - Avoid friction and shearing  - Provide appropriate hygiene as needed including keeping skin clean and dry  - Evaluate need for skin moisturizer/barrier cream  - Collaborate with interdisciplinary team   - Patient/family teaching  - Consider wound care consult   Outcome: Progressing Problem: Nutrition/Hydration-ADULT  Goal: Nutrient/Hydration intake appropriate for improving, restoring or maintaining nutritional needs  Description: Monitor and assess patient's nutrition/hydration status for malnutrition  Collaborate with interdisciplinary team and initiate plan and interventions as ordered  Monitor patient's weight and dietary intake as ordered or per policy  Utilize nutrition screening tool and intervene as necessary  Determine patient's food preferences and provide high-protein, high-caloric foods as appropriate       INTERVENTIONS:  - Monitor oral intake, urinary output, labs, and treatment plans  - Assess nutrition and hydration status and recommend course of action  - Evaluate amount of meals eaten  - Assist patient with eating if necessary   - Allow adequate time for meals  - Recommend/ encourage appropriate diets, oral nutritional supplements, and vitamin/mineral supplements  - Order, calculate, and assess calorie counts as needed  - Recommend, monitor, and adjust tube feedings and TPN/PPN based on assessed needs  - Assess need for intravenous fluids  - Provide specific nutrition/hydration education as appropriate  - Include patient/family/caregiver in decisions related to nutrition  Outcome: Progressing

## 2023-05-03 NOTE — QUICK NOTE
Updated by nursing, patient receiving bowel prep overnight  Was found asleep, oxygen desaturated to the low 80's on 4L NC and appearing lethargic, with cup in hand having completed around 2/3 of the prep  Patient was repositioned and aroused, became more responsive (was reportedly AAOx4), O2 saturation improved on 5L NC, and patient was noted to have a wet cough  At that time further bowel prep was held due to concern over aspiration risk  Patient continues to have black/brown liquid stools at this time  Patient currently AAOx4, with scattered rales of the BL lungs on physical exam and wet cough  Oxygen in the mid 80's on 5L NC  Due to concern over aspiration risk, and it currently being past 0000 for which patient was supposed to be NPO prior to procedure, will hold further bowel prep administration at this time  To check VBG and CXR  Ordered aspiration precautions and speech eval  Discussed plan with nursing, RT to assess patient and continue to assist with oxygen uptitration

## 2023-05-03 NOTE — PROGRESS NOTES
"Progress Note - Iam Benitez 69 y o  female MRN: [id number]    Unit/Bed#: -01 Encounter: [id number]        Subjective:     Patient was planned to have colonoscopy today  Unfortunately patient desaturated requiring 15 L of oxygen this morning, fortunately down to 7 L this afternoon  Per internal medicine note, it was noted that she was still drinking colonoscopy prep, even though her orders state NPO after midnight  Updated patient's son over the phone  Per nursing, night shift noting dark stool during bowel prep  ROS: As noted in the HPI, otherwise all others negative  Objective:     Vitals: Blood pressure 119/64, pulse 82, temperature 99 2 °F (37 3 °C), temperature source Axillary, resp  rate 18, height 5' 7\" (1 702 m), weight 41 3 kg (91 lb 0 8 oz), SpO2 93 %, not currently breastfeeding  ,Body mass index is 14 26 kg/m²  Intake/Output Summary (Last 24 hours) at 5/3/2023 1505  Last data filed at 5/3/2023 1333  Gross per 24 hour   Intake 300 ml   Output 200 ml   Net 100 ml       Physical Exam:     General Appearance: Alert and oriented x 3  Some conversational dyspnea while on mid flow nasal cannula  Lungs: Clear to auscultation bilaterally, no rales or rhonchi  Heart: Regular rate and rhythm, S1, S2 normal, no murmur, click, rub or gallop  Abdomen: Soft, non-tender, non-distended; bowel sounds normal; no masses or no organomegaly  Extremities: No cyanosis, edema    Invasive Devices     Peripheral Intravenous Line  Duration           Peripheral IV 05/01/23 Distal;Dorsal (posterior); Left Forearm 2 days                Lab Results:  Results from last 7 days   Lab Units 05/03/23  0450 05/01/23  0417 04/30/23  0939   WBC Thousand/uL 19 40*   < > 16 25*   HEMOGLOBIN g/dL 10 2*   < > 10 3*   HEMATOCRIT % 32 8*   < > 33 3*   PLATELETS Thousands/uL 233   < > 192   NEUTROS PCT %  --   --  59   LYMPHS PCT %  --   --  34   MONOS PCT %  --   --  7   EOS PCT %  --   --  0    < > = values in this interval not " displayed  Results from last 7 days   Lab Units 05/03/23  0450 05/01/23  0417 04/30/23  0939   POTASSIUM mmol/L 2 9*   < > 4 2   CHLORIDE mmol/L 90*   < > 88*   CO2 mmol/L 43*   < > 44*   BUN mg/dL 19   < > 18   CREATININE mg/dL 0 65   < > 0 64   CALCIUM mg/dL 9 7   < > 10 0   ALK PHOS U/L  --   --  74   ALT U/L  --   --  7   AST U/L  --   --  16    < > = values in this interval not displayed  Results from last 7 days   Lab Units 04/30/23  0939   INR  1 04           Imaging Studies: I have personally reviewed pertinent imaging studies  XR chest 1 view portable    Result Date: 5/1/2023  Impression: No radiographic evidence of acute intrathoracic process  Workstation performed: [id number]     PE Study with CT Abdomen and Pelvis with contrast    Result Date: 4/30/2023  Impression: 1  No pulmonary embolus  2   Emphysema  3   Chronic bronchitis  4   Tree-in-bud opacities throughout both lungs, most likely due to bronchiolitis  5   No evidence of acute abnormality in the abdomen or pelvis  Workstation performed: [id number]         Assessment and Plan:     1) Rectal bleeding, history of precancerous colon polyps - Patient with anemia at baseline  CT on admission showing no obvious bowel wall thickening  Lactic WNL   Bleeding may be hemorrhoidal, colon polyp, AVM, diverticular, less likely ischemic colitis, etc    - Of course, we are holding off on endoscopic evaluation at this time  -Discussed with patient's son over the phone  -Continue to monitor hemoglobin closely and transfuse as necessary to goal hemoglobin above 7-8  - Please notify GI if patient were to have recurrence of rectal bleeding or other signs of or GI bleeding  - Recommend speech evaluation once patient's respiratory status improves

## 2023-05-03 NOTE — ASSESSMENT & PLAN NOTE
· Patient coming in secondary to worsening shortness of breath over the past couple days  · Patient received nebulizer and steroids in the emergency department  · CTA chest abdomen pelvis: no pulmonary embolism, emphysema, chronic bronchitis, tree-in-bud opacities throughout both lungs  · Worsening leukocytosis, possible etiology is steroid induced versus aspiration pneumonia  · We will start Unasyn  · 5/1: Procalcitonin <0 05, will repeat in a m    · Continue IV steroids  · Nebulizers as needed  · Respiratory protocol  · Continue to monitor

## 2023-05-03 NOTE — RESPIRATORY THERAPY NOTE
RT Protocol Note  Nicolasa Chester 69 y o  female MRN: [id number]  Unit/Bed#: -01 Encounter: [id number]    Assessment    Principal Problem:    Chronic obstructive pulmonary disease with acute exacerbation ([place] [address] )  Active Problems:    Hypokalemia    Essential hypertension    Tobacco abuse    Chronic respiratory failure with hypoxia ([facility])    CAD (coronary artery disease), native coronary artery    Major depressive disorder, recurrent episode, moderate (HCC)    Anemia    BRBPR (bright red blood per rectum)    Moderate protein-calorie malnutrition (HCC)      Home Pulmonary Medications:    Home Devices/Therapy: Home O2    Past Medical History:   Diagnosis Date    Acute congestive heart failure ([place] [address] ) 8/27/2021    Anxiety     Cardiac disease     Chest pain 8/27/2021    Chronic pain disorder     COPD (chronic obstructive pulmonary disease) ([facility])     Depression     Heart disease     Hyperlipidemia     Hypertension     MI (myocardial infarction) ([place] [address] )     MRSA (methicillin resistant Staphylococcus aureus)     Renal disorder     benign kidney tumor     Social History     Socioeconomic History    Marital status:      Spouse name: None    Number of children: 3    Years of education: 15    Highest education level: High school graduate   Occupational History    Occupation: [employer]     Employer: [employer]     Comment: retired    Tobacco Use    Smoking status: Every Day     Packs/day: 1 00     Years: 60 00     Pack years: 60 00     Types: Cigarettes    Smokeless tobacco: Never    Tobacco comments:     She has close to a 60 pack-year smoking history, most recently has cut down to 4-5 cigarettes daily   Vaping Use    Vaping Use: Never used   Substance and Sexual Activity    Alcohol use: Never    Drug use: No    Sexual activity: Not Currently   Other Topics Concern    None   Social History Narrative    None     Social Determinants of Health     Financial Resource Strain: Not "on file   Food Insecurity: No Food Insecurity    Worried About Running Out of Food in the Last Year: Never true    Ran Out of Food in the Last Year: Never true   Transportation Needs: No Transportation Needs    Lack of Transportation (Medical): No    Lack of Transportation (Non-Medical): No   Physical Activity: Not on file   Stress: Not on file   Social Connections: Not on file   Intimate Partner Violence: Not on file   Housing Stability: Low Risk     Unable to Pay for Housing in the Last Year: No    Number of Places Lived in the Last Year: 2    Unstable Housing in the Last Year: No       Subjective         Objective    Physical Exam:   Assessment Type: Post-treatment  General Appearance: Lethargic  Respiratory Pattern: Labored, Shallow  Chest Assessment: Chest expansion symmetrical  Bilateral Breath Sounds: Diminished, Inspiratory wheezes, Coarse  Cough: Moist, Strong  O2 Device: [address]    Vitals:  Blood pressure 121/65, pulse 82, temperature 98 6 °F (37 °C), resp  rate 16, height 5' 7\" (1 702 m), weight 41 3 kg (91 lb 0 8 oz), SpO2 92 %, not currently breastfeeding  Imaging and other studies: I have personally reviewed pertinent reports        O2 Device: [address]     Plan    Respiratory Plan: Home Bronchodilator Patient pathway        Resp Comments: will continue with current tx plan     "

## 2023-05-03 NOTE — SPEECH THERAPY NOTE
Speech-Language Pathology Bedside Swallow Evaluation        Patient Name: Catalino Guardado  LDSHB'H Date: 5/3/2023     Problem List  Principal Problem:    Chronic obstructive pulmonary disease with acute exacerbation ([place] [address] )  Active Problems:    Essential hypertension    Tobacco abuse    Chronic respiratory failure with hypoxia (HCC)    CAD (coronary artery disease), native coronary artery    Major depressive disorder, recurrent episode, moderate (HCC)    Anemia    BRBPR (bright red blood per rectum)    Moderate protein-calorie malnutrition (HCC)       Past Medical History  Past Medical History:   Diagnosis Date    Acute congestive heart failure ([place] [address] ) 8/27/2021    Anxiety     Cardiac disease     Chest pain 8/27/2021    Chronic pain disorder     COPD (chronic obstructive pulmonary disease) (HCC)     Depression     Heart disease     Hyperlipidemia     Hypertension     MI (myocardial infarction) ([place] [address] )     MRSA (methicillin resistant Staphylococcus aureus)     Renal disorder     benign kidney tumor       Past Surgical History  Past Surgical History:   Procedure Laterality Date    APPENDECTOMY      ELBOW BURSA SURGERY Left 02/2018    D/T MRSA INFECTION    SPINAL FUSION      L7 L9       Summary/Impressions:    Pt presents with s/s suggestive of oropharyngeal dysphagia; noted sores/?skin tear on r-side of tongue and ?thrush on lingual surface  Discomfort noted with oral intake  Poor bolus breakdown of hard and small pieces of soft texture  Increased work of breath with all intake, O2 saturation remains 91%  Overt coughing following thin liquids, cannot r/o aspiration of same  Signs of dysphagia may be related to difficulty w/ coordination of breathing and swallowing         Recommendations:   Diet: mechanically altered/level 2 diet and nectar thick liquids   Meds: whole with puree   Feeding assistance: tray set up w/ assist   Frequent Oral care: 2-4x/day  Aspiration precautions and compensatory swallowing strategies: upright posture, only feed when fully alert, slow rate of feeding, small bites/sips and alternating bites and sips, resp monitoring   Other Recommendations/ considerations: SLP will continue to follow for diet tolerance/mgmt and adjust as indicated 1-3x       Current Medical Status  Pt is a 69 y o  female who presented to [facility] with Pt c/o worsening sob since last night, pt has a hx of copd, pt wears 2-3 L O2 at baseline and was 94% on 5L on EMS arrival     Per notes overnight:    Updated by nursing, patient receiving bowel prep overnight  Was found asleep, oxygen desaturated to the low 80's on 4L NC and appearing lethargic, with cup in hand having completed around 2/3 of the prep  Patient was repositioned and aroused, became more responsive (was reportedly AAOx4), O2 saturation improved on 5L NC, and patient was noted to have a wet cough  At that time further bowel prep was held due to concern over aspiration risk  Dysphagia evaluation orders placed  Past medical history:  Please see H&P for details    Special Studies:  4/30/23 Chest XR:  No radiographic evidence of acute intrathoracic process  4/30/23 CTA chest:  1  No pulmonary embolus  2   Emphysema  3   Chronic bronchitis  4   Tree-in-bud opacities throughout both lungs, most likely due to bronchiolitis  5   No evidence of acute abnormality in the abdomen or pelvis  Social/Education/Vocational Hx:  Pt lives in SNF/ECF    Swallow Information   Current Risks for Dysphagia & Aspiration: resp status  Current Symptoms/Concerns: coughing with po and change in respiratory status  Current Diet: NPO   Baseline Diet: regular diet and thin liquids    Baseline Assessment   Behavior/Cognition: alert  Speech/Language Status: able to participate in basic conversation  Patient Positioning: upright in bed    Swallow Mechanism Exam   Facial: symmetrical  Labial: WFL  ? brusing (right)  Lingual: skin tear (right)  Velum: unable to visualize  Mandible: adequate ROM  Dentition: edentulous (limited)   Vocal quality:breathy and weak   Volitional Cough: weak   Respiratory: 12L    Consistencies Assessed and Performance   Consistencies Administered: ice chips, thin liquids, nectar thick, honey thick, puree, mechanical soft solids and soft solids    Oral Stage: Adequate bolus retrieval and containment  Mastication prolonged yet incomplete for harder texture  Transfer prolonged  Mild retention (solids)  Oral efficiency improved w/ added moisture  Pharyngeal Stage: Laryngeal rise noted upon palpation  Swallow initiation appears delayed  Increased work of breath w/ PO trials  Coughing following thin liquid trials  No overt s/s of aspiration otherwise  Esophageal Concerns: none reported      Results Reviewed with: patient, RN and PA     Plan  Will continue to follow for 1-3x    Dysphagia Goals: pt will tolerate dys2 solids with nectar thick without s/s of aspiration x1-3  Pt will participate in trials for advancement across x1-3 diagnostic sessions      Discharge recommendation: home health    Speech Therapy Prognosis   Prognosis: fair  Prognosis Considerations: respiratory status        Michelle Robertson [address], [address] [facility]  Speech-Language Pathologist  [place] #[id number]  [address] [place] [address] #[id number]

## 2023-05-03 NOTE — ASSESSMENT & PLAN NOTE
Malnutrition Findings:   Adult Malnutrition type: Chronic illness  Adult Degree of Malnutrition: Malnutrition of moderate degree  Malnutrition Characteristics: Muscle loss, Fat loss                  360 Statement: Related to catabolic illness as evidenced by mild depletion of body fat ([place]) and mild depletion of muscle mass ([place]) treated with diet and oral nutrition supplements  BMI Findings: Body mass index is 14 26 kg/m²

## 2023-05-03 NOTE — PLAN OF CARE
Problem: Potential for Falls  Goal: Patient will remain free of falls  Description: INTERVENTIONS:  - Educate patient/family on patient safety including physical limitations  - Instruct patient to call for assistance with activity   - Consult OT/PT to assist with strengthening/mobility   - Keep Call bell within reach  - Keep bed low and locked with side rails adjusted as appropriate  - Keep care items and personal belongings within reach  - Initiate and maintain comfort rounds  - Make Fall Risk Sign visible to staff  - Offer Toileting every  Hours, in advance of need  - Initiate/Maintain alarm  - Obtain necessary fall risk management equipment:   - Apply yellow socks and bracelet for high fall risk patients  - Consider moving patient to room near nurses station  Outcome: Progressing     Problem: PAIN - ADULT  Goal: Verbalizes/displays adequate comfort level or baseline comfort level  Description: Interventions:  - Encourage patient to monitor pain and request assistance  - Assess pain using appropriate pain scale  - Administer analgesics based on type and severity of pain and evaluate response  - Implement non-pharmacological measures as appropriate and evaluate response  - Consider cultural and social influences on pain and pain management  - Notify physician/advanced practitioner if interventions unsuccessful or patient reports new pain  Outcome: Progressing     Problem: INFECTION - ADULT  Goal: Absence or prevention of progression during hospitalization  Description: INTERVENTIONS:  - Assess and monitor for signs and symptoms of infection  - Monitor lab/diagnostic results  - Monitor all insertion sites, i e  indwelling lines, tubes, and drains  - Monitor endotracheal if appropriate and nasal secretions for changes in amount and color  - [place] appropriate cooling/warming therapies per order  - Administer medications as ordered  - Instruct and encourage patient and family to use good hand hygiene technique  - Identify and instruct in appropriate isolation precautions for identified infection/condition  Outcome: Progressing  Goal: Absence of fever/infection during neutropenic period  Description: INTERVENTIONS:  - Monitor WBC    Outcome: Progressing     Problem: SAFETY ADULT  Goal: Patient will remain free of falls  Description: INTERVENTIONS:  - Educate patient/family on patient safety including physical limitations  - Instruct patient to call for assistance with activity   - Consult OT/PT to assist with strengthening/mobility   - Keep Call bell within reach  - Keep bed low and locked with side rails adjusted as appropriate  - Keep care items and personal belongings within reach  - Initiate and maintain comfort rounds  - Make Fall Risk Sign visible to staff  - Offer Toileting every  Hours, in advance of need  - Initiate/Maintain alarm  - Obtain necessary fall risk management equipment:   - Apply yellow socks and bracelet for high fall risk patients  - Consider moving patient to room near nurses station  Outcome: Progressing  Goal: Maintain or return to baseline ADL function  Description: INTERVENTIONS:  -  Assess patient's ability to carry out ADLs; assess patient's baseline for ADL function and identify physical deficits which impact ability to perform ADLs (bathing, care of mouth/teeth, toileting, grooming, dressing, etc )  - Assess/evaluate cause of self-care deficits   - Assess range of motion  - Assess patient's mobility; develop plan if impaired  - Assess patient's need for assistive devices and provide as appropriate  - Encourage maximum independence but intervene and supervise when necessary  - Involve family in performance of ADLs  - Assess for home care needs following discharge   - Consider OT consult to assist with ADL evaluation and planning for discharge  - Provide patient education as appropriate  Outcome: Progressing  Goal: Maintains/Returns to pre admission functional level  Description: INTERVENTIONS:  - Perform BMAT or MOVE assessment daily    - Set and communicate daily mobility goal to care team and patient/family/caregiver  - Collaborate with rehabilitation services on mobility goals if consulted  - Perform Range of Motion  times a day  - Reposition patient every  hours  - Dangle patient  times a day  - Stand patient  times a day  - Ambulate patient  times a day  - Out of bed to chair  times a day   - Out of bed for meals  times a day  - Out of bed for toileting  - Record patient progress and toleration of activity level   Outcome: Progressing     Problem: DISCHARGE PLANNING  Goal: Discharge to home or other facility with appropriate resources  Description: INTERVENTIONS:  - Identify barriers to discharge w/patient and caregiver  - Arrange for needed discharge resources and transportation as appropriate  - Identify discharge learning needs (meds, wound care, etc )  - Arrange for interpretive services to assist at discharge as needed  - Refer to Case Management Department for coordinating discharge planning if the patient needs post-hospital services based on physician/advanced practitioner order or complex needs related to functional status, cognitive ability, or social support system  Outcome: Progressing     Problem: Knowledge Deficit  Goal: Patient/family/caregiver demonstrates understanding of disease process, treatment plan, medications, and discharge instructions  Description: Complete learning assessment and assess knowledge base    Interventions:  - Provide teaching at level of understanding  - Provide teaching via preferred learning methods  Outcome: Progressing     Problem: MOBILITY - ADULT  Goal: Maintain or return to baseline ADL function  Description: INTERVENTIONS:  -  Assess patient's ability to carry out ADLs; assess patient's baseline for ADL function and identify physical deficits which impact ability to perform ADLs (bathing, care of mouth/teeth, toileting, grooming, dressing, etc )  - Assess/evaluate cause of self-care deficits   - Assess range of motion  - Assess patient's mobility; develop plan if impaired  - Assess patient's need for assistive devices and provide as appropriate  - Encourage maximum independence but intervene and supervise when necessary  - Involve family in performance of ADLs  - Assess for home care needs following discharge   - Consider OT consult to assist with ADL evaluation and planning for discharge  - Provide patient education as appropriate  Outcome: Progressing  Goal: Maintains/Returns to pre admission functional level  Description: INTERVENTIONS:  - Perform BMAT or MOVE assessment daily    - Set and communicate daily mobility goal to care team and patient/family/caregiver  - Collaborate with rehabilitation services on mobility goals if consulted  - Perform Range of Motion  times a day  - Reposition patient every  hours    - Dangle patient  times a day  - Stand patient  times a day  - Ambulate patient  times a day  - Out of bed to chair  times a day   - Out of bed for meals times a day  - Out of bed for toileting  - Record patient progress and toleration of activity level   Outcome: Progressing     Problem: Prexisting or High Potential for Compromised Skin Integrity  Goal: Skin integrity is maintained or improved  Description: INTERVENTIONS:  - Identify patients at risk for skin breakdown  - Assess and monitor skin integrity  - Assess and monitor nutrition and hydration status  - Monitor labs   - Assess for incontinence   - Turn and reposition patient  - Assist with mobility/ambulation  - Relieve pressure over bony prominences  - Avoid friction and shearing  - Provide appropriate hygiene as needed including keeping skin clean and dry  - Evaluate need for skin moisturizer/barrier cream  - Collaborate with interdisciplinary team   - Patient/family teaching  - Consider wound care consult   Outcome: Progressing     Problem: Nutrition/Hydration-ADULT  Goal: Nutrient/Hydration intake appropriate for improving, restoring or maintaining nutritional needs  Description: Monitor and assess patient's nutrition/hydration status for malnutrition  Collaborate with interdisciplinary team and initiate plan and interventions as ordered  Monitor patient's weight and dietary intake as ordered or per policy  Utilize nutrition screening tool and intervene as necessary  Determine patient's food preferences and provide high-protein, high-caloric foods as appropriate       INTERVENTIONS:  - Monitor oral intake, urinary output, labs, and treatment plans  - Assess nutrition and hydration status and recommend course of action  - Evaluate amount of meals eaten  - Assist patient with eating if necessary   - Allow adequate time for meals  - Recommend/ encourage appropriate diets, oral nutritional supplements, and vitamin/mineral supplements  - Order, calculate, and assess calorie counts as needed  - Recommend, monitor, and adjust tube feedings and TPN/PPN based on assessed needs  - Assess need for intravenous fluids  - Provide specific nutrition/hydration education as appropriate  - Include patient/family/caregiver in decisions related to nutrition  Outcome: Progressing

## 2023-05-03 NOTE — CASE MANAGEMENT
Case Management Discharge Planning Note    Patient name Danyelle Mcintosh  Location [address] [id number] MRN [id number]  : 1945 Date 5/3/2023       Current Admission Date: 2023  Current Admission Diagnosis:Chronic obstructive pulmonary disease with acute exacerbation [facility])   Patient Active Problem List    Diagnosis Date Noted    Moderate protein-calorie malnutrition ([address] ) 2023    BRBPR (bright red blood per rectum) 2023    Generalized weakness 2023    Staring episodes 2023    Waldenstrom macroglobulinemia ([facility] ) 2023    Severe protein-calorie malnutrition ([facility] ) 2021    Positive blood culture 2021    Chronic obstructive pulmonary disease with acute exacerbation ([facility] ) 2021    SIRS (systemic inflammatory response syndrome) ([facility] ) 2021    Anemia 2021    Abnormal computed tomography angiography (CTA) 2021    Fatigue 2021    Major depressive disorder, recurrent episode, moderate ([address] ) 2019    Flank pain 2019    Pneumonia 2019    Sepsis ([address] ) 2019    CAD (coronary artery disease), native coronary artery 2018    Chronic respiratory failure with hypoxia ([facility] ) 2018    Ambulatory dysfunction 2018    Hypokalemia 2018    Essential hypertension 2018    Tobacco abuse 2018      LOS (days): 2  Geometric Mean LOS (GMLOS) (days): 2 70  Days to GMLOS:0 8     OBJECTIVE:  Risk of Unplanned Readmission Score: 28 06         Current admission status: Inpatient   Preferred Pharmacy:   [facility]/pharmacy #[id number]- [place] - [address]  Phone: [phone] Fax: [phone], [address]  [address] [place] [address]  Phone: [phone] Fax: [phone]    Primary Care Provider: Airam Escalante MD    Primary Insurance: MEDICARE  Secondary Insurance: [place] [employer] [facility]    DISCHARGE DETAILS:    Other Referral/Resources/Interventions Provided:  Interventions: Short Term Rehab  Referral Comments: Per rounding with [facility], patient had an aspiration event overnight  She has a speech eval pending, and her colonoscopy has been rescheduled for tomorrow  She is anticipated to be discharged in 48 hours  SUZETTE sent a message to [facility] via [employer] Gabriella rojas of the same

## 2023-05-03 NOTE — PROGRESS NOTES
[facility]  Progress Note  Name: Iglesia Killian  MRN: [id number]  Unit/Bed#: -01 I Date of Admission: 4/30/2023   Date of Service: 5/3/2023 I Hospital Day: 2    Assessment/Plan   * Chronic obstructive pulmonary disease with acute exacerbation ([facility] )  Assessment & Plan  · Patient coming in secondary to worsening shortness of breath over the past couple days  · Patient received nebulizer and steroids in the emergency department  · CTA chest abdomen pelvis: no pulmonary embolism, emphysema, chronic bronchitis, tree-in-bud opacities throughout both lungs  · Worsening leukocytosis, possible etiology is steroid induced versus aspiration pneumonia  · We will start Unasyn  · 5/1: Procalcitonin <0 05, will repeat in a m  · Continue IV steroids  · Nebulizers as needed  · Respiratory protocol  · Continue to monitor    Moderate protein-calorie malnutrition (HCC)  Assessment & Plan  Malnutrition Findings:   Adult Malnutrition type: Chronic illness  Adult Degree of Malnutrition: Malnutrition of moderate degree  Malnutrition Characteristics: Muscle loss, Fat loss                  360 Statement: Related to catabolic illness as evidenced by mild depletion of body fat ([place]) and mild depletion of muscle mass ([place]) treated with diet and oral nutrition supplements  BMI Findings: Body mass index is 14 26 kg/m²         BRBPR (bright red blood per rectum)  Assessment & Plan  · Nursing report 2 episodes of BRBPR over the course of last night  · This is patient's first occurrence and reports not having history   · GI consulted:   · Colonoscopy was canceled a second time, patient aspirated last night on bowel prep, bowel Prep was not completed  · Hemoglobin at baseline, currently 10 2  · We will continue ASA, however stop Lovenox    Anemia  Assessment & Plan  · Patient with baseline anemia around 8-9  · Hemoglobin currently 10 2, improving since admission  · May be hemoconcentrated  · Continue to monitor    Major depressive disorder, recurrent episode, moderate (HCC)  Assessment & Plan  · Continue home medications Wellbutrin and viibryd    CAD (coronary artery disease), native coronary artery  Assessment & Plan  · Continue home medications    Chronic respiratory failure with hypoxia (HCC)  Assessment & Plan  · Chronically on 3-4 L nasal cannula  · Currently on 10 L, patient had aspiration event last night  · Chest x-ray per my read was relatively unimpressive, did have flattening of costophrenic angles, awaiting official read  · Given worsening leukocytosis and worsening shortness of breath, will start Unasyn    Tobacco abuse  Assessment & Plan  · Discussed importance of smoking cessation for greater than 3 minutes  · Nicotine patch ordered    Essential hypertension  Assessment & Plan  · Blood pressure adequately controlled at this time  · Continue home blood pressure medication  · Continue to monitor    Hypokalemia  Assessment & Plan  · Potassium today 2 9, will receive 60 mEq of IV K  · Placed on telemetry  · Repeat potassium in a m  VTE Pharmacologic Prophylaxis: VTE Score: 3 Moderate Risk (Score 3-4) - Pharmacological DVT Prophylaxis Contraindicated  Sequential Compression Devices Ordered  Patient Centered Rounds: I performed bedside rounds with nursing staff today  Discussions with Specialists or Other Care Team Provider: Orpha Aschoff    Education and Discussions with Family / Patient: Updated  (son) via phone  Total Time Spent on Date of Encounter in care of patient: 35 minutes This time was spent on one or more of the following: performing physical exam; counseling and coordination of care; obtaining or reviewing history; documenting in the medical record; reviewing/ordering tests, medications or procedures; communicating with other healthcare professionals and discussing with patient's family/caregivers      Current Length of Stay: 2 day(s)  Current Patient Status: Inpatient   Certification Statement: The patient will continue to require additional inpatient hospital stay due to Possible aspiration pneumonia, continued diarrhea with GI bleed, GI may need to decide outpatient versus inpatient colonoscopy  Discharge Plan: Anticipate discharge in 48 hrs to rehab facility  Code Status: Level 3 - DNAR and DNI    Subjective:   Patient reports being significantly short of breath and having some chills at times  She denies chest pain/pressure, palpitations, lightheadedness, nausea, or a bowel movement yet today  Objective:     Vitals:   Temp (24hrs), Av 4 °F (36 9 °C), Min:97 5 °F (36 4 °C), Max:99 9 °F (37 7 °C)    Temp:  [97 5 °F (36 4 °C)-99 9 °F (37 7 °C)] 99 2 °F (37 3 °C)  HR:  [] 82  Resp:  [18-20] 18  BP: (119-172)/(63-94) 119/64  SpO2:  [83 %-95 %] 90 %  Body mass index is 14 26 kg/m²  Input and Output Summary (last 24 hours): Intake/Output Summary (Last 24 hours) at 5/3/2023 1144  Last data filed at 5/3/2023 1030  Gross per 24 hour   Intake 0 ml   Output 200 ml   Net -200 ml       Physical Exam:   Physical Exam  Vitals and nursing note reviewed  Constitutional:       General: She is not in acute distress  Appearance: She is normal weight  She is ill-appearing  She is not toxic-appearing  HENT:      Head: Normocephalic  Nose: Nose normal       Mouth/Throat:      Mouth: Mucous membranes are moist       Pharynx: Oropharynx is clear  Eyes:      General: No scleral icterus  Conjunctiva/sclera: Conjunctivae normal       Pupils: Pupils are equal, round, and reactive to light  Cardiovascular:      Rate and Rhythm: Normal rate and regular rhythm  Heart sounds: No murmur heard  No friction rub  No gallop  Pulmonary:      Effort: Pulmonary effort is normal  No respiratory distress  Breath sounds: No stridor  Wheezing ( Significant  wheezing at bilateral bases of lungs) present   No rhonchi or rales    Abdominal:      General: Abdomen is flat  Palpations: Abdomen is soft  Musculoskeletal:         General: Normal range of motion  Cervical back: Normal range of motion and neck supple  Right lower leg: No edema  Left lower leg: No edema  Lymphadenopathy:      Cervical: No cervical adenopathy  Skin:     General: Skin is warm  Coloration: Skin is not jaundiced or pale  Findings: No bruising, erythema or lesion  Neurological:      General: No focal deficit present  Mental Status: She is alert and oriented to person, place, and time  Mental status is at baseline  Cranial Nerves: No cranial nerve deficit  Motor: No weakness  Additional Data:     Labs:  Results from last 7 days   Lab Units 05/03/23 0450 05/01/23 0417 04/30/23  0939   WBC Thousand/uL 19 40*   < > 16 25*   HEMOGLOBIN g/dL 10 2*   < > 10 3*   HEMATOCRIT % 32 8*   < > 33 3*   PLATELETS Thousands/uL 233   < > 192   NEUTROS PCT %  --   --  59   LYMPHS PCT %  --   --  34   MONOS PCT %  --   --  7   EOS PCT %  --   --  0    < > = values in this interval not displayed  Results from last 7 days   Lab Units 05/03/23 0450 05/01/23 0417 04/30/23  0939   SODIUM mmol/L 139   < > 134*   POTASSIUM mmol/L 2 9*   < > 4 2   CHLORIDE mmol/L 90*   < > 88*   CO2 mmol/L 43*   < > 44*   BUN mg/dL 19   < > 18   CREATININE mg/dL 0 65   < > 0 64   ANION GAP mmol/L 6   < > 2*   CALCIUM mg/dL 9 7   < > 10 0   ALBUMIN g/dL  --   --  3 7   TOTAL BILIRUBIN mg/dL  --   --  0 25   ALK PHOS U/L  --   --  74   ALT U/L  --   --  7   AST U/L  --   --  16   GLUCOSE RANDOM mg/dL 90   < > 121    < > = values in this interval not displayed       Results from last 7 days   Lab Units 04/30/23  0939   INR  1 04             Results from last 7 days   Lab Units 05/01/23  1602 05/01/23  1314 05/01/23 0417 04/30/23  1609 04/30/23  0939   LACTIC ACID mmol/L 0 7 2 7*  --   --  0 6   PROCALCITONIN ng/ml  --   --  <0 05 <0 05  -- Lines/Drains:  Invasive Devices     Peripheral Intravenous Line  Duration           Peripheral IV 05/01/23 Distal;Dorsal (posterior); Left Forearm 2 days                  Telemetry:  Telemetry Orders (From admission, onward)             24 Hour Telemetry Monitoring  Continuous x 24 Hours ([place])        References:    Telemetry Guidelines   Question:  Reason for 24 Hour Telemetry  Answer:  Metabolic/Electrolyte Disturbance with High Probability of Dysrhythmia (K level <3 or >6, or KCL infusion >10mEq/hr)                 Telemetry Reviewed: Normal Sinus Rhythm  Indication for Continued Telemetry Use: Metabolic/electrolyte disturbance with high probability of dysrhythmia             Imaging: Reviewed radiology reports from this admission including: chest xray, chest CT scan and abdominal/pelvic CT    Recent Cultures (last 7 days):   Results from last 7 days   Lab Units 04/30/23  0943 04/30/23  0940   BLOOD CULTURE  No Growth at 48 hrs  No Growth at 48 hrs         Last 24 Hours Medication List:   Current Facility-Administered Medications   Medication Dose Route Frequency Provider Last Rate    acetaminophen  650 mg Oral Q6H PRN [facility], DO      ampicillin-sulbactam  3 g Intravenous Q6H Sedmelyssa Olivo PA-C      aspirin  81 mg Oral Daily [place], [place]      benzonatate  100 mg Oral TID PRN [facility], DO      budesonide-formoterol  2 puff Inhalation BID [facility], DO      buPROPion  75 mg Oral Daily [facility], DO      carvedilol  25 mg Oral BID With Meals [facility], DO      ferrous sulfate  325 mg Oral Daily With Breakfast [facility], DO      furosemide  20 mg Oral Daily [facility], DO      guaiFENesin  600 mg Oral BID [facility], DO      hydrochlorothiazide  25 mg Oral Daily [facility], DO      HYDROcodone-acetaminophen  1 tablet Oral Q6H PRN [facility], DO      hydrocortisone   Topical BID Judeth Mohs, PA-C  hydrOXYzine HCL  25 mg Oral Q6H PRN Dior Olivo PA-C      ipratropium  0 5 mg Nebulization TID Amrit Lawrence,       ipratropium  0 5 mg Nebulization Q6H PRN Nando Cortez MD      levalbuterol  1 25 mg Nebulization TID Nando Cortez MD      levalbuterol  1 25 mg Nebulization Q6H PRN Nando Cortez MD      lisinopril  20 mg Oral BID Amrit Lawrence, DO      melatonin  3 mg Oral HS PRN Amrit Lawrence,       nicotine  1 patch Transdermal Daily Amrit Lawrence,       nitroglycerin  0 4 mg Sublingual Q5 Min PRN Amrit Lawrence,       ondansetron  4 mg Intravenous Q6H PRN Ananda Brown PA-C      potassium chloride  20 mEq Intravenous Q2H Dior Olivo PA-C 20 mEq (05/03/23 1004)    predniSONE  40 mg Oral Daily Dior Olivo [place]      vilazodone  40 mg Oral Daily With Breakfast Amrit Lawrence DO          Today, Patient Was Seen By: Dior Olivo PA-C    **Please Note: This note may have been constructed using a voice recognition system  **

## 2023-05-03 NOTE — ASSESSMENT & PLAN NOTE
· Nursing report 2 episodes of BRBPR over the course of last night  · This is patient's first occurrence and reports not having history   · GI consulted:   · Colonoscopy was canceled a second time, patient aspirated last night on bowel prep, bowel Prep was not completed  · Hemoglobin at baseline, currently 10 2  · We will continue ASA, however stop Lovenox

## 2023-05-03 NOTE — ASSESSMENT & PLAN NOTE
· Chronically on 3-4 L nasal cannula  · Currently on 10 L, patient had aspiration event last night  · Chest x-ray per my read was relatively unimpressive, did have flattening of costophrenic angles, awaiting official read  · Given worsening leukocytosis and worsening shortness of breath, will start Unasyn

## 2023-05-03 NOTE — ASSESSMENT & PLAN NOTE
· Potassium today 2 9, will receive 60 mEq of IV K  · Placed on telemetry  · Repeat potassium in a m

## 2023-05-03 NOTE — PLAN OF CARE
Recommendations:   Diet: mechanically altered/level 2 diet and nectar thick liquids   Meds: whole with puree   Feeding assistance: tray set up w/ assist   Frequent Oral care: 2-4x/day  Aspiration precautions and compensatory swallowing strategies: upright posture, only feed when fully alert, slow rate of feeding, small bites/sips and alternating bites and sips, resp monitoring   Other Recommendations/ considerations: SLP will continue to follow for diet tolerance/mgmt and adjust as indicated 1-3x

## 2023-05-04 ENCOUNTER — APPOINTMENT (INPATIENT)
Dept: RADIOLOGY | Facility: HOSPITAL | Age: 78
End: 2023-05-04

## 2023-05-04 LAB
ANION GAP SERPL CALCULATED.3IONS-SCNC: 5 MMOL/L (ref 4–13)
BUN SERPL-MCNC: 23 MG/DL (ref 5–25)
CALCIUM SERPL-MCNC: 9.1 MG/DL (ref 8.4–10.2)
CHLORIDE SERPL-SCNC: 96 MMOL/L (ref 96–108)
CO2 SERPL-SCNC: 41 MMOL/L (ref 21–32)
CREAT SERPL-MCNC: 0.72 MG/DL (ref 0.6–1.3)
ERYTHROCYTE [DISTWIDTH] IN BLOOD BY AUTOMATED COUNT: 16 % (ref 11.6–15.1)
GFR SERPL CREATININE-BSD FRML MDRD: 81 ML/MIN/1.73SQ M
GLUCOSE SERPL-MCNC: 117 MG/DL (ref 65–140)
HCT VFR BLD AUTO: 29.3 % (ref 34.8–46.1)
HGB BLD-MCNC: 9.1 G/DL (ref 11.5–15.4)
MCH RBC QN AUTO: 29.5 PG (ref 26.8–34.3)
MCHC RBC AUTO-ENTMCNC: 31.1 G/DL (ref 31.4–37.4)
MCV RBC AUTO: 95 FL (ref 82–98)
PLATELET # BLD AUTO: 186 THOUSANDS/UL (ref 149–390)
PMV BLD AUTO: 9.7 FL (ref 8.9–12.7)
POTASSIUM SERPL-SCNC: 3.5 MMOL/L (ref 3.5–5.3)
PROCALCITONIN SERPL-MCNC: 0.07 NG/ML
RBC # BLD AUTO: 3.08 MILLION/UL (ref 3.81–5.12)
SODIUM SERPL-SCNC: 142 MMOL/L (ref 135–147)
WBC # BLD AUTO: 15.17 THOUSAND/UL (ref 4.31–10.16)

## 2023-05-04 RX ORDER — POTASSIUM CHLORIDE 14.9 MG/ML
20 INJECTION INTRAVENOUS
Status: COMPLETED | OUTPATIENT
Start: 2023-05-04 | End: 2023-05-04

## 2023-05-04 RX ADMIN — IPRATROPIUM BROMIDE 0.5 MG: 0.5 SOLUTION RESPIRATORY (INHALATION) at 07:12

## 2023-05-04 RX ADMIN — CARVEDILOL 25 MG: 12.5 TABLET, FILM COATED ORAL at 15:57

## 2023-05-04 RX ADMIN — FUROSEMIDE 20 MG: 20 TABLET ORAL at 09:06

## 2023-05-04 RX ADMIN — POTASSIUM CHLORIDE 20 MEQ: 14.9 INJECTION, SOLUTION INTRAVENOUS at 12:23

## 2023-05-04 RX ADMIN — IPRATROPIUM BROMIDE 0.5 MG: 0.5 SOLUTION RESPIRATORY (INHALATION) at 20:18

## 2023-05-04 RX ADMIN — CARVEDILOL 25 MG: 12.5 TABLET, FILM COATED ORAL at 09:05

## 2023-05-04 RX ADMIN — HYDROCODONE BITARTRATE AND ACETAMINOPHEN 1 TABLET: 5; 325 TABLET ORAL at 09:06

## 2023-05-04 RX ADMIN — SODIUM CHLORIDE 3 G: 900 INJECTION, SOLUTION INTRAVENOUS at 23:41

## 2023-05-04 RX ADMIN — LISINOPRIL 20 MG: 20 TABLET ORAL at 09:06

## 2023-05-04 RX ADMIN — VILAZODONE HYDROCHLORIDE 40 MG: 20 TABLET ORAL at 09:09

## 2023-05-04 RX ADMIN — HYDROCHLOROTHIAZIDE 25 MG: 25 TABLET ORAL at 09:06

## 2023-05-04 RX ADMIN — HYDROCODONE BITARTRATE AND ACETAMINOPHEN 1 TABLET: 5; 325 TABLET ORAL at 15:57

## 2023-05-04 RX ADMIN — POTASSIUM CHLORIDE 20 MEQ: 14.9 INJECTION, SOLUTION INTRAVENOUS at 10:02

## 2023-05-04 RX ADMIN — FERROUS SULFATE TAB 325 MG (65 MG ELEMENTAL FE) 325 MG: 325 (65 FE) TAB at 09:06

## 2023-05-04 RX ADMIN — ASPIRIN 81 MG: 81 TABLET, COATED ORAL at 09:05

## 2023-05-04 RX ADMIN — GUAIFENESIN 600 MG: 600 TABLET ORAL at 09:06

## 2023-05-04 RX ADMIN — POTASSIUM CHLORIDE 20 MEQ: 14.9 INJECTION, SOLUTION INTRAVENOUS at 16:43

## 2023-05-04 RX ADMIN — SODIUM CHLORIDE 3 G: 900 INJECTION, SOLUTION INTRAVENOUS at 17:45

## 2023-05-04 RX ADMIN — LEVALBUTEROL HYDROCHLORIDE 1.25 MG: 1.25 SOLUTION RESPIRATORY (INHALATION) at 07:12

## 2023-05-04 RX ADMIN — LISINOPRIL 20 MG: 20 TABLET ORAL at 17:45

## 2023-05-04 RX ADMIN — BUPROPION HYDROCHLORIDE TABLETS 75 MG: 75 TABLET, FILM COATED ORAL at 09:05

## 2023-05-04 RX ADMIN — LEVALBUTEROL HYDROCHLORIDE 1.25 MG: 1.25 SOLUTION RESPIRATORY (INHALATION) at 20:18

## 2023-05-04 RX ADMIN — PREDNISONE 40 MG: 20 TABLET ORAL at 09:06

## 2023-05-04 RX ADMIN — HYDROCODONE BITARTRATE AND ACETAMINOPHEN 1 TABLET: 5; 325 TABLET ORAL at 23:40

## 2023-05-04 RX ADMIN — LEVALBUTEROL HYDROCHLORIDE 1.25 MG: 1.25 SOLUTION RESPIRATORY (INHALATION) at 13:35

## 2023-05-04 RX ADMIN — SODIUM CHLORIDE 3 G: 900 INJECTION, SOLUTION INTRAVENOUS at 11:11

## 2023-05-04 RX ADMIN — SODIUM CHLORIDE 3 G: 900 INJECTION, SOLUTION INTRAVENOUS at 04:42

## 2023-05-04 RX ADMIN — IPRATROPIUM BROMIDE 0.5 MG: 0.5 SOLUTION RESPIRATORY (INHALATION) at 13:35

## 2023-05-04 RX ADMIN — GUAIFENESIN 600 MG: 600 TABLET ORAL at 17:45

## 2023-05-04 NOTE — ASSESSMENT & PLAN NOTE
· Nursing report 2 episodes of BRBPR over the course of night of 4/30  · This is patient's first occurrence and reports not having history   · GI consulted:   · 5/3: Colonoscopy was canceled a second time due to patient's aspiration on bowel prep and incompletion of bowel prep   Colonoscopy will be rescheduled for a future date per GI  · Hemoglobin at baseline, currently 9 1  · Continue ASA, however stop Lovenox

## 2023-05-04 NOTE — SPEECH THERAPY NOTE
Speech Language/Pathology     Speech/Language Pathology Progress Note     Patient Name: Bernard Torres    ZNUOZ'F Date: 5/4/2023     Problem List  Principal Problem:    Chronic obstructive pulmonary disease with acute exacerbation ([address] )  Active Problems:    Hypokalemia    Essential hypertension    Tobacco abuse    Chronic respiratory failure with hypoxia (HCC)    CAD (coronary artery disease), native coronary artery    Major depressive disorder, recurrent episode, moderate (HCC)    Anemia    BRBPR (bright red blood per rectum)    Moderate protein-calorie malnutrition (HCC)        Subjective:  Patient received awake and alert  Noted to be in bed, sipping thin liquids w/ increased work of breath  Previous/current diet: dys2/nectar     Objective: The following consistencies were tested thin liquids, nectar thick  Patient agreeable to minimal trials today  C/o head/neck pain  Patient presents with functional bolus containment, manipulation and control for liquid textures  Increased work of breath noted; pt requires frequent breaks (thin>nectar)  Some improvement with thicker liquid  No overt s/s of aspiration or distress though cannot r/o silent aspiration  Assessment:  Given recurrent pneumonia over the last few months and signs of distress w/ oral intake, cannot r/o aspiration/pharyngeal dysphagia  Further assessment recommended by way of modified barium swallow study to determine safest/least restrictive diet and r/o aspiration/silent aspiration        Plan:  MBS- orders requested       Michelle Lawrence Angel [address], [address] [facility]  Speech-Language Pathologist  [place] #[id number]  [place] #[id number]

## 2023-05-04 NOTE — PROCEDURES
Speech-Language Pathology Videofluoroscopic Swallow Study (Modified Barium Swallow Study)   (VFSS/MBSS)      Patient Name: Catalino Guardado    QVLUW'V Date: 5/4/2023     Problem List  Patient Active Problem List   Diagnosis    Ambulatory dysfunction    Hypokalemia    Essential hypertension    Tobacco abuse    Chronic respiratory failure with hypoxia (HCC)    CAD (coronary artery disease), native coronary artery    Flank pain    Pneumonia    Sepsis ([facility] )    Major depressive disorder, recurrent episode, moderate (HCC)    Fatigue    Chronic obstructive pulmonary disease with acute exacerbation (HCC)    SIRS (systemic inflammatory response syndrome) (HCC)    Anemia    Abnormal computed tomography angiography (CTA)    Severe protein-calorie malnutrition (HCC)    Positive blood culture    Waldenstrom macroglobulinemia (HCC)    Generalized weakness    Staring episodes    BRBPR (bright red blood per rectum)    Moderate protein-calorie malnutrition (HCC)       Past Medical History  Past Medical History:   Diagnosis Date    Acute congestive heart failure (HCC) 8/27/2021    Anxiety     Cardiac disease     Chest pain 8/27/2021    Chronic pain disorder     [facility]     Depression     Heart disease     Hyperlipidemia     Hypertension     MI (myocardial infarction) ([facility] )     MRSA (methicillin resistant Staphylococcus aureus)     Renal disorder     benign kidney tumor       Past Surgical History  Past Surgical History:   Procedure Laterality Date    APPENDECTOMY      ELBOW BURSA SURGERY Left 02/2018    D/T MRSA INFECTION    SPINAL FUSION      L7 L9       General Information;  Pt is a 69 y o  female who presented to [address] [place] [address] [address] with worsening sob   Pt was seen for a BSE, with recommendations for dys2/nectar   VFSS (MBS) was recommended due to concerns for recurrent pneumonia and signs of aspiration/changes in resp status with thin liquids  Concerns for silent aspiration  Previous VFSS ([facility]): none on file     Consistencies Administered:  Pt was viewed sitting upright in the lateral position  Trials administered were consistent with [facility] Protocol: 5-mL thin liquid x2, 20-mL cup sip thin, 40-mL sequential swallow thin, 5-mL nectar thick, 20-mL cup sip nectar thick, 40-mL sequential swallow nectar thick, 5-mL Honey thick, 5-mL pudding, ½ cookie coated with 3-mL pudding  Thin liquids given by straw  Results are as follows:   Oral stage; Pt presents with minimal oral dysphagia characterized by reduced tongue control and posterior loss of the bolus to the level of the valleculae during mastication  Mastication prolonged  AP transfer is slowed  Collection of oral retention  Pharyngeal stage; Pt presents with mild-moderate pharyngeal dysphagia characterized by delayed swallow response with spillage to the valleculae and pyriforms  Thin liquid material noted to fill the pyriforms and fall into the airway upon trigger of pharyngeal swallow, resulting in aspiration  Aspiration silent in nature as patient became short of breath but elicited no protective cough at any point  This appeared to occur across multiple trials and attempts w/ use of chin tuck  Strategy ineffective  CP prominence with trace coating at the UES w/o retropulsion  Airway closure appears incomplete though no aspiration or pharyngeal retention noted otherwise           Penetration/Aspiration:  Thin: PAS-8  Nectar: PAS-1  Honey: PAS-1  Puree:  PAS-1  Solid: PAS-1   Response to Aspiration: silent; shortness of breath observed   Strategies/Efficacy:  chin tuck- ineffective          8-Point Penetration-Aspiration Scale   1 Material does not enter the airway   2 Material enters the airway, remains above the vocal folds, and is ejected  from the  airway    3 Material enters the airway, remains above the vocal folds, and is not ejected from the airway   4 Material enters the airway, contacts the vocal folds, and is ejected from the airway   5 Material enters the airway, contacts the vocal folds, and is not ejected from the airway    6 Material enters the airway, passes below the vocal folds and is ejected into the larynx or out of the airway    7 Material enters the airway, passes below the vocal folds, and is not ejected from the trachea despite effort    8 Material enters the airway, passes below the vocal folds, and no effort is made to eject       Screening of Esophageal stage:  Esophageal screening was completed  Evidence of barium in the esophagus following the swallow w/ retrograde flow below the UES  Assessment Summary; Pt presents with mild oropharyngeal dysphagia characterized by silent aspiration of thin liquids  Delayed swallow with spillage of all textures in  pharynx  Aspiration of pooled thin liquid material in the pyriforms  No patient response  Chin tuck, cup v straw sip ineffective in preventing aspiration  CP prominence without retropulsion  *Images available for direct review in PACS     Recommendations; Recommend soft/level 3 diet and nectar thick liquids, with upright posture, slow rate of feeding, small bites/sips and respiratory monitoring  Recommended Form of Meds: whole with puree   Intermittent Supervision  Aspiration precautions   Aspiration precautions posted  If a dedicated assessment of the esophagus is desired, consider esophagram/barium swallow  Results reviewed with patient and CRNP  Goals:  Pt will tolerate least restrictive diet w/out s/s aspiration or oral/pharyngeal difficulties      Treatment Recommendations: SLP follow up at next level of care; will continue to monitor while in the acute care setting      Discharge recommendation: SNF/ST rehab at next level of care       Michelle Lawrence Angel [address], [address] Pathologist  [place] #[id number]  [address] [place] [address] #[id number]

## 2023-05-04 NOTE — ASSESSMENT & PLAN NOTE
· Chronically on 3-4 L nasal cannula  · Currently on 6L; initially required 15 L overnight on 5/3  · Patient had aspiration event the night of 5/2  · CXR 5/3: Right lung base opacity which may reflect atelectasis or pneumonia    · Given leukocytosis and shortness of breath, will continue Unasyn

## 2023-05-04 NOTE — RESPIRATORY THERAPY NOTE
RT Protocol Note  Samantha Laws 69 y o  female MRN: [id number]  Unit/Bed#: -01 Encounter: [id number]    Assessment    Principal Problem:    Chronic obstructive pulmonary disease with acute exacerbation ([facility] )  Active Problems:    Hypokalemia    Tobacco abuse    Chronic respiratory failure with hypoxia (HCC)    Anemia    BRBPR (bright red blood per rectum)    Moderate protein-calorie malnutrition (HCC)      Home Pulmonary Medications:  Inhalers/neb    Home Devices/Therapy: Home O2    Past Medical History:   Diagnosis Date    Acute congestive heart failure ([facility] ) 8/27/2021    Anxiety     Cardiac disease     Chest pain 8/27/2021    Chronic pain disorder     COPD (chronic obstructive pulmonary disease) (HCC)     Depression     Heart disease     Hyperlipidemia     Hypertension     MI (myocardial infarction) ([facility])     MRSA (methicillin resistant Staphylococcus aureus)     Renal disorder     benign kidney tumor     Social History     Socioeconomic History    Marital status:      Spouse name: None    Number of children: 3    Years of education: 13    Highest education level: High school graduate   Occupational History    Occupation:      Employer: [employer]     Comment: retired    Tobacco Use    Smoking status: Every Day     Packs/day: 1 00     Years: 60 00     Pack years: 60 00     Types: Cigarettes    Smokeless tobacco: Never    Tobacco comments:     She has close to a 60 pack-year smoking history, most recently has cut down to 4-5 cigarettes daily   Vaping Use    Vaping Use: Never used   Substance and Sexual Activity    Alcohol use: Never    Drug use: No    Sexual activity: Not Currently   Other Topics Concern    None   Social History Narrative    None     Social Determinants of Health     Financial Resource Strain: Not on file   Food Insecurity: No Food Insecurity    Worried About Running Out of Food in the Last Year: Never true    Ran Out of Food in the Last Year: Never true "  Transportation Needs: No Transportation Needs    Lack of Transportation (Medical): No    Lack of Transportation (Non-Medical): No   Physical Activity: Not on file   Stress: Not on file   Social Connections: Not on file   Intimate Partner Violence: Not on file   Housing Stability: Low Risk     Unable to Pay for Housing in the Last Year: No    Number of Places Lived in the Last Year: 2    Unstable Housing in the Last Year: No       Subjective    Subjective Data: awake and alert    Objective    Physical Exam:   Assessment Type: Post-treatment  General Appearance: Awake, Alert  Respiratory Pattern: Normal  Chest Assessment: Chest expansion symmetrical  Bilateral Breath Sounds: Diminished, Scattered, Crackles (crackles in the bases)  Cough: None  O2 Device: NC    Vitals:  Blood pressure 148/77, pulse 85, temperature 98 7 °F (37 1 °C), temperature source Axillary, resp  rate 18, height 5' 7\" (1 702 m), weight 40 3 kg (88 lb 13 5 oz), SpO2 93 %, not currently breastfeeding  Imaging and other studies: I have personally reviewed pertinent reports  O2 Device: NC     Plan    Respiratory Plan: Home Bronchodilator Patient pathway        Resp Comments: Pt resting comfortably and in no apparent distress  Offers no complaints  Will cont w/ edith tx     "

## 2023-05-04 NOTE — PLAN OF CARE
Recommendations; Recommend soft/level 3 diet and nectar thick liquids, with upright posture, slow rate of feeding, small bites/sips and respiratory monitoring  Recommended Form of Meds: whole with puree   Intermittent Supervision  Aspiration precautions   Aspiration precautions posted  If a dedicated assessment of the esophagus is desired, consider esophagram/barium swallow

## 2023-05-04 NOTE — PLAN OF CARE
Problem: Potential for Falls  Goal: Patient will remain free of falls  Description: INTERVENTIONS:  - Educate patient/family on patient safety including physical limitations  - Instruct patient to call for assistance with activity   - Consult OT/PT to assist with strengthening/mobility   - Keep Call bell within reach  - Keep bed low and locked with side rails adjusted as appropriate  - Keep care items and personal belongings within reach  - Initiate and maintain comfort rounds  - Make Fall Risk Sign visible to staff  - Offer Toileting every 2 Hours, in advance of need  - Initiate/Maintain bed alarm  - Obtain necessary fall risk management equipment:   - Apply yellow socks and bracelet for high fall risk patients  - Consider moving patient to room near nurses station  Outcome: Progressing     Problem: PAIN - ADULT  Goal: Verbalizes/displays adequate comfort level or baseline comfort level  Description: Interventions:  - Encourage patient to monitor pain and request assistance  - Assess pain using appropriate pain scale  - Administer analgesics based on type and severity of pain and evaluate response  - Implement non-pharmacological measures as appropriate and evaluate response  - Consider cultural and social influences on pain and pain management  - Notify physician/advanced practitioner if interventions unsuccessful or patient reports new pain  Outcome: Progressing     Problem: INFECTION - ADULT  Goal: Absence or prevention of progression during hospitalization  Description: INTERVENTIONS:  - Assess and monitor for signs and symptoms of infection  - Monitor lab/diagnostic results  - Monitor all insertion sites, i e  indwelling lines, tubes, and drains  - Monitor endotracheal if appropriate and nasal secretions for changes in amount and color  - [place] appropriate cooling/warming therapies per order  - Administer medications as ordered  - Instruct and encourage patient and family to use good hand hygiene technique  - Identify and instruct in appropriate isolation precautions for identified infection/condition  Outcome: Progressing  Goal: Absence of fever/infection during neutropenic period  Description: INTERVENTIONS:  - Monitor WBC    Outcome: Progressing     Problem: SAFETY ADULT  Goal: Patient will remain free of falls  Description: INTERVENTIONS:  - Educate patient/family on patient safety including physical limitations  - Instruct patient to call for assistance with activity   - Consult OT/PT to assist with strengthening/mobility   - Keep Call bell within reach  - Keep bed low and locked with side rails adjusted as appropriate  - Keep care items and personal belongings within reach  - Initiate and maintain comfort rounds  - Make Fall Risk Sign visible to staff  - Offer Toileting every 2 Hours, in advance of need  - Initiate/Maintain bed alarm  - Obtain necessary fall risk management equipment:   - Apply yellow socks and bracelet for high fall risk patients  - Consider moving patient to room near nurses station  Outcome: Progressing  Goal: Maintain or return to baseline ADL function  Description: INTERVENTIONS:  -  Assess patient's ability to carry out ADLs; assess patient's baseline for ADL function and identify physical deficits which impact ability to perform ADLs (bathing, care of mouth/teeth, toileting, grooming, dressing, etc )  - Assess/evaluate cause of self-care deficits   - Assess range of motion  - Assess patient's mobility; develop plan if impaired  - Assess patient's need for assistive devices and provide as appropriate  - Encourage maximum independence but intervene and supervise when necessary  - Involve family in performance of ADLs  - Assess for home care needs following discharge   - Consider OT consult to assist with ADL evaluation and planning for discharge  - Provide patient education as appropriate  Outcome: Progressing  Goal: Maintains/Returns to pre admission functional level  Description: INTERVENTIONS:  - Perform BMAT or MOVE assessment daily    - Set and communicate daily mobility goal to care team and patient/family/caregiver  - Collaborate with rehabilitation services on mobility goals if consulted  - Perform Range of Motion 3 times a day  - Reposition patient every 2 hours  - Dangle patient 3 times a day  - Stand patient 3 times a day  - Ambulate patient 3 times a day  - Out of bed to chair 3 times a day   - Out of bed for meals 3 times a day  - Out of bed for toileting  - Record patient progress and toleration of activity level   Outcome: Progressing     Problem: DISCHARGE PLANNING  Goal: Discharge to home or other facility with appropriate resources  Description: INTERVENTIONS:  - Identify barriers to discharge w/patient and caregiver  - Arrange for needed discharge resources and transportation as appropriate  - Identify discharge learning needs (meds, wound care, etc )  - Arrange for interpretive services to assist at discharge as needed  - Refer to Case Management Department for coordinating discharge planning if the patient needs post-hospital services based on physician/advanced practitioner order or complex needs related to functional status, cognitive ability, or social support system  Outcome: Progressing     Problem: Knowledge Deficit  Goal: Patient/family/caregiver demonstrates understanding of disease process, treatment plan, medications, and discharge instructions  Description: Complete learning assessment and assess knowledge base    Interventions:  - Provide teaching at level of understanding  - Provide teaching via preferred learning methods  Outcome: Progressing     Problem: MOBILITY - ADULT  Goal: Maintain or return to baseline ADL function  Description: INTERVENTIONS:  -  Assess patient's ability to carry out ADLs; assess patient's baseline for ADL function and identify physical deficits which impact ability to perform ADLs (bathing, care of mouth/teeth, toileting, grooming, dressing, etc )  - Assess/evaluate cause of self-care deficits   - Assess range of motion  - Assess patient's mobility; develop plan if impaired  - Assess patient's need for assistive devices and provide as appropriate  - Encourage maximum independence but intervene and supervise when necessary  - Involve family in performance of ADLs  - Assess for home care needs following discharge   - Consider OT consult to assist with ADL evaluation and planning for discharge  - Provide patient education as appropriate  Outcome: Progressing  Goal: Maintains/Returns to pre admission functional level  Description: INTERVENTIONS:  - Perform BMAT or MOVE assessment daily    - Set and communicate daily mobility goal to care team and patient/family/caregiver  - Collaborate with rehabilitation services on mobility goals if consulted  - Perform Range of Motion 3 times a day  - Reposition patient every 2 hours    - Dangle patient 3 times a day  - Stand patient 3 times a day  - Ambulate patient 3 times a day  - Out of bed to chair 3 times a day   - Out of bed for meals 3 times a day  - Out of bed for toileting  - Record patient progress and toleration of activity level   Outcome: Progressing     Problem: Prexisting or High Potential for Compromised Skin Integrity  Goal: Skin integrity is maintained or improved  Description: INTERVENTIONS:  - Identify patients at risk for skin breakdown  - Assess and monitor skin integrity  - Assess and monitor nutrition and hydration status  - Monitor labs   - Assess for incontinence   - Turn and reposition patient  - Assist with mobility/ambulation  - Relieve pressure over bony prominences  - Avoid friction and shearing  - Provide appropriate hygiene as needed including keeping skin clean and dry  - Evaluate need for skin moisturizer/barrier cream  - Collaborate with interdisciplinary team   - Patient/family teaching  - Consider wound care consult   Outcome: Progressing Problem: Nutrition/Hydration-ADULT  Goal: Nutrient/Hydration intake appropriate for improving, restoring or maintaining nutritional needs  Description: Monitor and assess patient's nutrition/hydration status for malnutrition  Collaborate with interdisciplinary team and initiate plan and interventions as ordered  Monitor patient's weight and dietary intake as ordered or per policy  Utilize nutrition screening tool and intervene as necessary  Determine patient's food preferences and provide high-protein, high-caloric foods as appropriate       INTERVENTIONS:  - Monitor oral intake, urinary output, labs, and treatment plans  - Assess nutrition and hydration status and recommend course of action  - Evaluate amount of meals eaten  - Assist patient with eating if necessary   - Allow adequate time for meals  - Recommend/ encourage appropriate diets, oral nutritional supplements, and vitamin/mineral supplements  - Order, calculate, and assess calorie counts as needed  - Recommend, monitor, and adjust tube feedings and TPN/PPN based on assessed needs  - Assess need for intravenous fluids  - Provide specific nutrition/hydration education as appropriate  - Include patient/family/caregiver in decisions related to nutrition  Outcome: Progressing

## 2023-05-04 NOTE — OCCUPATIONAL THERAPY NOTE
Occupational Therapy Treatment Note     Patient Name: Lilly Miguel  AJULB'X Date: 5/4/2023  Problem List  Principal Problem:    Chronic obstructive pulmonary disease with acute exacerbation ([facility] [address] )  Active Problems:    Hypokalemia    Tobacco abuse    Chronic respiratory failure with hypoxia (HCC)    Anemia    BRBPR (bright red blood per rectum)    Moderate protein-calorie malnutrition ([facility] [address] )        05/04/23 1205   OT Last Visit   OT Visit Date 05/04/23   Note Type   Note Type Treatment   Pain Assessment   Pain Assessment Tool 0-10   Pain Score 9   Pain Location/Orientation Location: Generalized   Pain Onset/Description Onset: Ongoing;Frequency: Constant/Continuous   Hospital Pain Intervention(s) Ambulation/increased activity;Repositioned;Medication (See MAR)   Restrictions/Precautions   Weight Bearing Precautions Per Order No   Other Precautions Chair Alarm; Bed Alarm;Multiple lines;Telemetry;O2;Fall Risk;Pain  (6 L O2 via NC)   Lifestyle   Autonomy Patient reporting being independent with ADLs, ambulatory with RW, and lives with son   Reciprocal Relationships Son assists c IADLs   Service to Others Retired   ADL   Toileting Assistance  2  Maximal Assistance   Toileting Deficit Setup;Steadying;Verbal cueing;Supervison/safety; Increased time to complete; Bedside commode;Perineal hygiene   Bed Mobility   Supine to Sit 5  Supervision   Additional items Assist x 1;HOB elevated; Increased time required;Verbal cues   Sit to Supine   (DNT: pt seated OOB in recliner at end of session)   Additional Comments Pt reported (+) lightheadedness upon sitting at EOB  Symptoms resolved with increased seated rest   Transfers   Sit to Stand 4  Minimal assistance   Additional items Assist x 2;Armrests; Increased time required;Verbal cues   Stand to Sit 4  Minimal assistance   Additional items Assist x 2; Increased time required;Verbal cues;Armrests   Additional Comments Pt requires verbal cues for safety and proper body mechanics   Functional Mobility   Functional Mobility 4  Minimal assistance  (Assist of 2)   Additional Comments Pt ambulated short distance from bed>commode>recliner  No overt SOB  Pt grossly unsteady  Limited by pain  Additional items Rolling walker   Toilet Transfers   Toilet Transfer From [employer] Transfer Type To and from   Toilet Transfer to Standard bedside commode   Toilet Transfer Technique Ambulating   Toilet Transfers Minimal assistance  (Assist of 2)   Cognition   Overall Cognitive Status [facility]   Arousal/Participation Alert; Responsive; Cooperative   Attention Within functional limits   Orientation Level Oriented to person;Oriented to place;Oriented to situation  (Requires cues for month/year)   Memory Decreased recall of recent events;Decreased recall of precautions   Following Commands Follows multistep commands with increased time or repetition   Comments Pt agreeable to OT session   Activity Tolerance   Activity Tolerance Patient limited by pain; Patient limited by fatigue   Assessment   Assessment Patient participated in Skilled OT session this date with interventions consisting of ADL re training with the use of correct body mechnaics,  therapeutic activities to: increase activity tolerance and increase dynamic sit/ stand balance during functional activity    Patient agreeable to OT treatment session, upon arrival patient was found supine in bed and in no apparent distress  Patient completed bed mobility with supervision and functional transfers with min assist of 2  Pt ambulated from bed>commode>recliner with min assist of 2  Pt required min assist of 2 for standard BSC  Pt required max assist for toilet hygiene  In comparison to previous session, patient with improvements in endurance and balance  Patient requiring verbal cues for correct technique, one step directives and frequent rest periods   Patient continues to be functioning below baseline level, occupational performance remains limited secondary to factors listed above and increased risk for falls and injury  From OT standpoint, recommendation at time of d/c would be Post acute rehabilitation services  Patient to benefit from continued Occupational Therapy treatment while in the hospital to address deficits as defined above and maximize level of functional independence with ADLs and functional mobility  Plan   Treatment Interventions ADL retraining;Functional transfer training; Endurance training;Patient/family training; Compensatory technique education   Goal Expiration Date 05/17/23   OT Treatment Day 1   OT Frequency 3-5x/wk   Recommendation   OT Discharge Recommendation Post acute rehabilitation services   Additional Comments  The patient's raw score on the AM-PAC Daily Activity Inpatient Short Form is 14  A raw score of less than 19 suggests the patient may benefit from discharge to post-acute rehabilitation services  Please refer to the recommendation of the Occupational Therapist for safe discharge planning  AM-PAC Daily Activity Inpatient   Lower Body Dressing 1   Bathing 2   Toileting 1   Upper Body Dressing 3   Grooming 3   Eating 4   Daily Activity Raw Score 14   Daily Activity Standardized Score (Calc for Raw Score >=11) 33 39   AM-PAC Applied Cognition Inpatient   Following a Speech/Presentation 4   Understanding Ordinary Conversation 4   Taking Medications 3   Remembering Where Things Are Placed or Put Away 3   Remembering List of 4-5 Errands 3   Taking Care of Complicated Tasks 3   Applied Cognition Raw Score 20   Applied Cognition Standardized Score 41 76   Modified [place] Scale   Modified [place] Scale 4   End of Consult   Patient Position at End of Consult Bedside chair;Bed/Chair alarm activated; All needs within reach   Nurse Communication Nurse aware of consult     Army Plush OTD, OTR/L

## 2023-05-04 NOTE — PLAN OF CARE
Problem: PHYSICAL THERAPY ADULT  Goal: Performs mobility at highest level of function for planned discharge setting  See evaluation for individualized goals  Description: Treatment/Interventions: Functional transfer training, LE strengthening/ROM, Elevations, Therapeutic exercise, Endurance training, Patient/family training, Equipment eval/education, Bed mobility, Gait training, Spoke to nursing, OT  Equipment Recommended: Ramírez Ramirez ([employer]IA)       See flowsheet documentation for full assessment, interventions and recommendations  Outcome: Progressing  Note: Prognosis: Good  Problem List: Decreased strength, Decreased endurance, Impaired balance, Decreased mobility  Assessment: Pt seen for PT treatment session this date, consisting of ther act focused on bed mobility, transfers, short gait trial from EOB to [facility] to recliner with use of RW and ther ex focused on strengthening  Since previous session, pt has made fair progress in terms of initiation of LB exercises; min A x2 for transfers and short gait trial; pt limited d/t fatigue, SOB/FISHMAN  Current goals and POC remain appropriate, pt continues to have rehab potential and is making fair progress towards STGs  Pt prognosis for achieving goals is good, pending pt progress with hospitalization/medical status improvements, and indicated by [facility], ability to follow directions and self-expression (thoughts, feelings, needs)  PT recommends post acute rehabilitation services upon discharge  Pt continues to be functioning below baseline level, and remains limited 2* factors listed above  PT will continue to see pt during current hospitalization in order to address the deficits listed above and provide interventions consistent w/ POC in effort to achieve STGs  Barriers to Discharge: None     PT Discharge Recommendation: Post acute rehabilitation services    See flowsheet documentation for full assessment

## 2023-05-04 NOTE — PHYSICAL THERAPY NOTE
"Physical Therapy Treatment Note       05/04/23 1150   PT Last Visit   PT Visit Date 05/04/23   Note Type   Note Type Treatment   Pain Assessment   Pain Assessment Tool 0-10   Pain Score   (\"9 5\")   Pain Location/Orientation Location: Generalized   Restrictions/Precautions   Weight Bearing Precautions Per Order No   Other Precautions Chair Alarm; Bed Alarm;Multiple lines;O2;Fall Risk;Hard of hearing;Pain  (6L O2 NC)   General   Chart Reviewed Yes   Response to Previous Treatment Patient with no complaints from previous session  Family/Caregiver Present No   Cognition   Arousal/Participation Alert; Responsive; Cooperative   Attention Within functional limits   Orientation Level Oriented to person;Oriented to place  (inconsistent to time)   Memory Decreased recall of recent events   Following Commands Follows multistep commands with increased time or repetition   Comments pt agreeable to PT session   Bed Mobility   Supine to Sit 5  Supervision   Additional items Assist x 1;HOB elevated; Bedrails; Increased time required;Verbal cues   Transfers   Sit to Stand 4  Minimal assistance   Additional items Assist x 2;Armrests; Increased time required;Verbal cues   Stand to Sit 4  Minimal assistance   Additional items Assist x 2;Armrests; Increased time required;Verbal cues   Toilet transfer 4  Minimal assistance   Additional items Assist x 2; Increased time required;Verbal cues; Commode;Armrests   Additional Comments Pt requires verbal cues for safety and proper body mechanics   Ambulation/Elevation   Gait pattern Decreased foot clearance;Shuffling; Short stride; Step to   Gait Assistance 4  Minimal assist   Additional items Assist x 2;Verbal cues; Tactile cues   Assistive Device Rolling walker   Distance 3' x2   Balance   Static Sitting Fair +   Dynamic Sitting Fair   Static Standing Fair -   Dynamic Standing Poor +   Ambulatory Poor +   Endurance Deficit   Endurance Deficit Yes   Activity Tolerance   Activity Tolerance Patient limited " by fatigue   Medical Staff Made Aware OT Beatriz Lockett   Nurse Made Aware RN [facility]   Exercises   Hip Abduction Sitting;10 reps;AROM; Bilateral   Knee AROM Long Arc Quad Sitting;10 reps;AROM; Bilateral   Ankle Pumps Sitting;10 reps;AROM; Bilateral   Marching Sitting;10 reps;AROM; Bilateral   Assessment   Prognosis Good   Problem List Decreased strength;Decreased endurance; Impaired balance;Decreased mobility   Assessment Pt seen for PT treatment session this date, consisting of ther act focused on bed mobility, transfers, [facility] use of [facility]W and ther ex focused on strengthening  Since previous session, pt has made fair progress in terms of initiation of LB exercises; min A x2 for transfers and short gait trial; pt limited d/t fatigue, SOB/FISHMAN  Current goals and POC remain appropriate, pt continues to have rehab potential and is making fair progress towards STGs  Pt prognosis for achieving goals is good, pending pt progress with hospitalization/medical status improvements, and indicated by [facility], ability to follow directions and self-expression (thoughts, feelings, needs)  PT recommends post acute rehabilitation services upon discharge  Pt continues to be functioning below baseline level, and remains limited 2* factors listed above  PT will continue to see pt during current hospitalization in order to address the deficits listed above and provide interventions consistent w/ POC in effort to achieve STGs  Goals   Patient Goals none expressed   STG Expiration Date 05/12/23   PT Treatment Day 1   Plan   Treatment/Interventions Functional transfer training;LE strengthening/ROM; Therapeutic exercise; Endurance training;Patient/family training;Equipment eval/education; Bed mobility;Gait training;Spoke to nursing;OT   Progress Slow progress, decreased activity tolerance   PT Frequency 3-5x/wk   Recommendation   PT Discharge Recommendation Post acute rehabilitation services   Equipment Recommended Walker  (RW)   AM-PAC Basic Mobility Inpatient   Turning in Flat Bed Without Bedrails 3   Lying on Back to Sitting on Edge of Flat Bed Without Bedrails 3   Moving Bed to Chair 2   Standing Up From Chair Using Arms 2   Walk in Room 2   Climb 3-5 Stairs With Railing 2   Basic Mobility Inpatient Raw Score 14   Basic Mobility Standardized Score 35 55   Highest Level Of Mobility   -HLM Goal 4: Move to chair/commode   -HLM Achieved 4: Move to chair/commode   Education   Education Provided Mobility training;Home exercise program;Assistive device   Patient Demonstrates acceptance/verbal understanding;Reinforcement needed   End of Consult   Patient Position at End of Consult Bedside chair; All needs within reach       Naresh Vieira, PT, DPT

## 2023-05-04 NOTE — ASSESSMENT & PLAN NOTE
· Patient coming in secondary to worsening shortness of breath over the past couple days  · CTA chest abdomen pelvis: no pulmonary embolism, emphysema, chronic bronchitis, tree-in-bud opacities throughout both lungs  · Leukocytosis improved today, however still elevated (15 17)   Possible etiology is steroid induced versus aspiration pneumonia  · Speech eval completed requesting video barium swallow  · Continue Unasyn; started on 5/3  · Procalcitonin <0 05/0 07  · Continue IV steroids  · Nebulizers as needed  · Respiratory protocol

## 2023-05-04 NOTE — PROGRESS NOTES
[facility]  Progress Note  Name: Madi Leiva  MRN: [id number]  Unit/Bed#: -01 I Date of Admission: 4/30/2023   Date of Service: 5/4/2023 I Hospital Day: 3    Assessment/Plan   * Chronic obstructive pulmonary disease with acute exacerbation ([facility] [address] )  Assessment & Plan  · Patient coming in secondary to worsening shortness of breath over the past couple days  · CTA chest abdomen pelvis: no pulmonary embolism, emphysema, chronic bronchitis, tree-in-bud opacities throughout both lungs  · Leukocytosis improved today, however still elevated (15 17)  Possible etiology is steroid induced versus aspiration pneumonia  · Speech eval completed requesting video barium swallow  · Continue Unasyn; started on 5/3  · Procalcitonin <0 05/0 07  · Continue IV steroids  · Nebulizers as needed  · Respiratory protocol    Chronic respiratory failure with hypoxia (HCC)  Assessment & Plan  · Chronically on 3-4 L nasal cannula  · Currently on 6L; initially required 15 L overnight on 5/3  · Patient had aspiration event the night of 5/2  · CXR 5/3: Right lung base opacity which may reflect atelectasis or pneumonia  · Given leukocytosis and shortness of breath, will continue Unasyn    BRBPR (bright red blood per rectum)  Assessment & Plan  · Nursing report 2 episodes of BRBPR over the course of night of 4/30  · This is patient's first occurrence and reports not having history   · GI consulted:   · 5/3: Colonoscopy was canceled a second time due to patient's aspiration on bowel prep and incompletion of bowel prep   Colonoscopy will be rescheduled for a future date per GI  · Hemoglobin at baseline, currently 9 1  · Continue ASA, however stop Lovenox    Essential hypertension  Assessment & Plan  · Blood pressure adequately controlled at this time  · Continue home blood pressure medication  · Continue to monitor    Hypokalemia  Assessment & Plan  · Improving  · Continue supplementation    Moderate protein-calorie malnutrition [facility])  Assessment & Plan  alnutrition Findings:   Adult Malnutrition type: Chronic illness  Adult Degree of Malnutrition: Malnutrition of moderate degree  Malnutrition Characteristics: Muscle loss, Fat loss      360 Statement: Related to catabolic illness as evidenced by mild depletion of body fat ([place]) and mild depletion of muscle mass ([place]) treated with diet and oral nutrition supplements  BMI Findings: Body mass index is 13 92 kg/m²  Anemia  Assessment & Plan  · Patient with baseline anemia around 8-9  · Hemoglobin currently 9 1  · May be hemoconcentrated  · Continue to monitor    Lab Results   Component Value Date    HGB 9 1 (L) 05/04/2023    HGB 10 2 (L) 05/03/2023    HGB 9 2 (L) 05/02/2023       Major depressive disorder, recurrent episode, moderate (HCC)  Assessment & Plan  · Continue home medications Wellbutrin and viibryd    CAD (coronary artery disease), native coronary artery  Assessment & Plan  · Continue home medications    Tobacco abuse  Assessment & Plan  · Discussed importance of smoking cessation for greater than 3 minutes  · Nicotine patch ordered       VTE Pharmacologic Prophylaxis: VTE Score: 3 Moderate Risk (Score 3-4) - Pharmacological DVT Prophylaxis Contraindicated  Sequential Compression Devices Ordered  Patient Centered Rounds: I performed bedside rounds with nursing staff today  Discussions with Specialists or Other Care Team Provider: CM, Reviewed GI and Pulmonology notes     Education and Discussions with Family / Patient: Patient declined call to        Total Time Spent on Date of Encounter in care of patient: 35 minutes This time was spent on one or more of the following: performing physical exam; counseling and coordination of care; obtaining or reviewing history; documenting in the medical record; reviewing/ordering tests, medications or procedures; communicating with other healthcare professionals and discussing with patient's family/caregivers  Current Length of Stay: 3 day(s)  Current Patient Status: Inpatient   Certification Statement: The patient will continue to require additional inpatient hospital stay due to possible aspiration pneumonia, continued diarrhea with GI bleed, GI may need to decide outpatient versus inpatient colonoscopy  Discharge Plan: Anticipate discharge in 24-48 hrs to rehab facility  Code Status: Level 3 - DNAR and DNI    Subjective:   Patient seen awake laying in hospital bed  Reports slight shortness of breath  States she still has a cough, however it seems to be improved today  Reports last bowel movement was this morning and states nursing staff informed her there was no noticeable blood in her BM  Currently denies chest pain, nausea, vomiting, fever, and chills  Objective:     Vitals:   Temp (24hrs), Av 6 °F (37 °C), Min:98 4 °F (36 9 °C), Max:98 7 °F (37 1 °C)    Temp:  [98 4 °F (36 9 °C)-98 7 °F (37 1 °C)] 98 7 °F (37 1 °C)  HR:  [] 102  Resp:  [16-22] 20  BP: (118-148)/(65-88) 148/77  SpO2:  [91 %-94 %] 91 %  Body mass index is 13 92 kg/m²  Input and Output Summary (last 24 hours): Intake/Output Summary (Last 24 hours) at 2023 1328  Last data filed at 2023 1300  Gross per 24 hour   Intake 1360 ml   Output 775 ml   Net 585 ml       Physical Exam:   Physical Exam  Vitals and nursing note reviewed  Constitutional:       General: She is awake  She is not in acute distress  Appearance: She is underweight  She is ill-appearing  She is not toxic-appearing  Cardiovascular:      Rate and Rhythm: Normal rate and regular rhythm  Pulses: Normal pulses  Heart sounds: Normal heart sounds  No murmur heard  Pulmonary:      Effort: Pulmonary effort is normal  No respiratory distress  Breath sounds: Wheezing (bilateral) present  Abdominal:      General: Bowel sounds are normal       Palpations: Abdomen is soft  Musculoskeletal:      Right lower leg: No edema  Left lower leg: No edema  Skin:     General: Skin is warm and dry  Psychiatric:         Mood and Affect: Mood normal          Behavior: Behavior normal        Additional Data:     Labs:  Results from last 7 days   Lab Units 23  0542 23  0939   WBC Thousand/uL 15 17*   < > 16 25*   HEMOGLOBIN g/dL 9 1*   < > 10 3*   HEMATOCRIT % 29 3*   < > 33 3*   PLATELETS Thousands/uL 186   < > 192   NEUTROS PCT %  --   --  59   LYMPHS PCT %  --   --  34   MONOS PCT %  --   --  7   EOS PCT %  --   --  0    < > = values in this interval not displayed  Results from last 7 days   Lab Units 23  0542 23  0939   SODIUM mmol/L 142   < > 134*   POTASSIUM mmol/L 3 5   < > 4 2   CHLORIDE mmol/L 96   < > 88*   CO2 mmol/L 41*   < > 44*   BUN mg/dL 23   < > 18   CREATININE mg/dL 0 72   < > 0 64   ANION GAP mmol/L 5   < > 2*   CALCIUM mg/dL 9 1   < > 10 0   ALBUMIN g/dL  --   --  3 7   TOTAL BILIRUBIN mg/dL  --   --  0 25   ALK PHOS U/L  --   --  74   ALT U/L  --   --  7   AST U/L  --   --  16   GLUCOSE RANDOM mg/dL 117   < > 121    < > = values in this interval not displayed  Results from last 7 days   Lab Units 23  0939   INR  1 04             Results from last 7 days   Lab Units 23  1135 23  1602 23  1314 23  1609 23  0939   LACTIC ACID mmol/L  --  0 7 2 7*  --   --  0 6   PROCALCITONIN ng/ml 0 07  --   --  <0 05 <0 05  --      Lines/Drains:  Invasive Devices     Peripheral Intravenous Line  Duration           Peripheral IV 23 Distal;Dorsal (posterior); Left Forearm 3 days              Telemetry:  Telemetry Orders (From admission, onward)             24 Hour Telemetry Monitoring  Continuous x 24 Hours ([place])           References:    Telemetry Guidelines   Question:  Reason for 24 Hour Telemetry  Answer:  Metabolic/Electrolyte Disturbance with High Probability of Dysrhythmia (K level <3 or >6, or KCL infusion >10mEq/hr)                      Imaging: Reviewed radiology reports from this admission including: chest xray and CTA Chest CT Abdomen Pelvis w Contrast    Recent Cultures (last 7 days):   Results from last 7 days   Lab Units 04/30/23  0943 04/30/23  0940   BLOOD CULTURE  No Growth at 72 hrs  No Growth at 72 hrs       Last 24 Hours Medication List:   Current Facility-Administered Medications   Medication Dose Route Frequency Provider Last Rate    acetaminophen  650 mg Oral Q6H PRN Michelle Peed, DO      ampicillin-sulbactam  3 g Intravenous Q6H Misty Lanes Prator, PA-C 3 g (05/04/23 1111)    aspirin  81 mg Oral Daily Michelle Peed, DO      benzonatate  100 mg Oral TID PRN Michelle Peed, DO      budesonide-formoterol  2 puff Inhalation BID Michelle Peed, DO      buPROPion  75 mg Oral Daily Michelle Peed, DO      carvedilol  25 mg Oral BID With Meals Michelle Peed, DO      ferrous sulfate  325 mg Oral Daily With Breakfast Michelle Peed, DO      furosemide  20 mg Oral Daily Michelle Peed, DO      guaiFENesin  600 mg Oral BID Michelle Peed, DO      hydrochlorothiazide  25 mg Oral Daily Michelle Peed, DO      HYDROcodone-acetaminophen  1 tablet Oral Q6H PRN Michelle Peed, DO      hydrocortisone   Topical BID Lillian Friday, PA-CASSIUS      hydrOXYzine HCL  25 mg Oral Q6H PRN Tona Lanes Prator, PA-C      ipratropium  0 5 mg Nebulization TID Michelle Peed, DO      ipratropium  0 5 mg Nebulization Q6H PRN Douglas Caraballo MD      levalbuterol  1 25 mg Nebulization TID Douglas Caraballo MD      levalbuterol  1 25 mg Nebulization Q6H PRN Douglas Caraballo MD      lisinopril  20 mg Oral BID Michelle Peed, DO      melatonin  3 mg Oral HS PRN Michelle Peed, DO      nicotine  1 patch Transdermal Daily Michelle Peed, DO      nitroglycerin  0 4 mg Sublingual Q5 Min PRN Michelle Peed, DO      ondansetron  4 mg Intravenous Q6H PRN Octavio Diaz PA-C      potassium chloride  20 mEq Intravenous Q2H TAMMIE Schaeffer 20 mEq (05/04/23 1223)    predniSONE  40 mg Oral Daily [place], [place]      vilazodone  40 mg Oral Daily With Breakfast Quilla Plain, DO          Today, Patient Was Seen By: TAMMIE Schaeffer    **Please Note: This note may have been constructed using a voice recognition system  **

## 2023-05-04 NOTE — RESPIRATORY THERAPY NOTE
05/04/23 0448   Respiratory Assessment   General Appearance Sleeping   Respiratory Pattern Normal   Resp Comments pt resting quietly with even non labored respirations   O2 Device mid flow   Oxygen Therapy/Pulse Ox   O2 Device Mid flow nasal cannula   O2 Therapy Oxygen humidified  (water replenished)   Nasal Cannula O2 Flow Rate (L/min) 6 L/min  (found at)   Calculated FIO2 (%) - Nasal Cannula 44   SpO2 92 %   SpO2 Activity At Rest   $ Pulse Oximetry Spot Check Charge Completed

## 2023-05-04 NOTE — PLAN OF CARE
Problem: OCCUPATIONAL THERAPY ADULT  Goal: Performs self-care activities at highest level of function for planned discharge setting  See evaluation for individualized goals  Description: Treatment Interventions: ADL retraining, Functional transfer training, Endurance training, Patient/family training, Compensatory technique education          See flowsheet documentation for full assessment, interventions and recommendations  Outcome: Progressing  Note: Limitation: Decreased ADL status, Decreased UE strength, Decreased endurance, Decreased self-care trans, Decreased high-level ADLs  Prognosis: Good  Assessment: Patient participated in Skilled OT session this date with interventions consisting of ADL re training with the use of correct body mechnaics,  therapeutic activities to: increase activity tolerance and increase dynamic sit/ stand balance during functional activity    Patient agreeable to OT treatment session, upon arrival patient was found supine in bed and in no apparent distress  Patient completed bed mobility with supervision and functional transfers with min assist of 2  Pt ambulated from bed>commode>recliner with min assist of 2  Pt required min assist of 2 for standard BSC  Pt required max assist for toilet hygiene  In comparison to previous session, patient with improvements in endurance and balance  Patient requiring verbal cues for correct technique, one step directives and frequent rest periods  Patient continues to be functioning below baseline level, occupational performance remains limited secondary to factors listed above and increased risk for falls and injury  From OT standpoint, recommendation at time of d/c would be Post acute rehabilitation services  Patient to benefit from continued Occupational Therapy treatment while in the hospital to address deficits as defined above and maximize level of functional independence with ADLs and functional mobility       OT Discharge Recommendation: Post acute rehabilitation services        Demetrio Manzano OTD, OTR/L

## 2023-05-04 NOTE — PLAN OF CARE
Problem: Potential for Falls  Goal: Patient will remain free of falls  Description: INTERVENTIONS:  - Educate patient/family on patient safety including physical limitations  - Instruct patient to call for assistance with activity   - Consult OT/PT to assist with strengthening/mobility   - Keep Call bell within reach  - Keep bed low and locked with side rails adjusted as appropriate  - Keep care items and personal belongings within reach  - Initiate and maintain comfort rounds  - Make Fall Risk Sign visible to staff  - Offer Toileting every 2 Hours, in advance of need  - Initiate/Maintain bed alarm  - Obtain necessary fall risk management equipment: yes   - Apply yellow socks and bracelet for high fall risk patients  - Consider moving patient to room near nurses station  Outcome: Progressing     Problem: PAIN - ADULT  Goal: Verbalizes/displays adequate comfort level or baseline comfort level  Description: Interventions:  - Encourage patient to monitor pain and request assistance  - Assess pain using appropriate pain scale  - Administer analgesics based on type and severity of pain and evaluate response  - Implement non-pharmacological measures as appropriate and evaluate response  - Consider cultural and social influences on pain and pain management  - Notify physician/advanced practitioner if interventions unsuccessful or patient reports new pain  Outcome: Progressing     Problem: INFECTION - ADULT  Goal: Absence or prevention of progression during hospitalization  Description: INTERVENTIONS:  - Assess and monitor for signs and symptoms of infection  - Monitor lab/diagnostic results  - Monitor all insertion sites, i e  indwelling lines, tubes, and drains  - Monitor endotracheal if appropriate and nasal secretions for changes in amount and color  - [place] appropriate cooling/warming therapies per order  - Administer medications as ordered  - Instruct and encourage patient and family to use good hand hygiene technique  - Identify and instruct in appropriate isolation precautions for identified infection/condition  Outcome: Progressing  Goal: Absence of fever/infection during neutropenic period  Description: INTERVENTIONS:  - Monitor WBC    Outcome: Progressing     Problem: SAFETY ADULT  Goal: Patient will remain free of falls  Description: INTERVENTIONS:  - Educate patient/family on patient safety including physical limitations  - Instruct patient to call for assistance with activity   - Consult OT/PT to assist with strengthening/mobility   - Keep Call bell within reach  - Keep bed low and locked with side rails adjusted as appropriate  - Keep care items and personal belongings within reach  - Initiate and maintain comfort rounds  - Make Fall Risk Sign visible to staff  - Offer Toileting every 2 Hours, in advance of need  - Initiate/Maintain bed alarm  - Obtain necessary fall risk management equipment: yes   - Apply yellow socks and bracelet for high fall risk patients  - Consider moving patient to room near nurses station  Outcome: Progressing     Problem: DISCHARGE PLANNING  Goal: Discharge to home or other facility with appropriate resources  Description: INTERVENTIONS:  - Identify barriers to discharge w/patient and caregiver  - Arrange for needed discharge resources and transportation as appropriate  - Identify discharge learning needs (meds, wound care, etc )  - Arrange for interpretive services to assist at discharge as needed  - Refer to Case Management Department for coordinating discharge planning if the patient needs post-hospital services based on physician/advanced practitioner order or complex needs related to functional status, cognitive ability, or social support system  Outcome: Progressing     Problem: Knowledge Deficit  Goal: Patient/family/caregiver demonstrates understanding of disease process, treatment plan, medications, and discharge instructions  Description: Complete learning assessment and assess knowledge base    Interventions:  - Provide teaching at level of understanding  - Provide teaching via preferred learning methods  Outcome: Progressing     Problem: MOBILITY - ADULT  Goal: Maintain or return to baseline ADL function  Description: INTERVENTIONS:  -  Assess patient's ability to carry out ADLs; assess patient's baseline for ADL function and identify physical deficits which impact ability to perform ADLs (bathing, care of mouth/teeth, toileting, grooming, dressing, etc )  - Assess/evaluate cause of self-care deficits   - Assess range of motion  - Assess patient's mobility; develop plan if impaired  - Assess patient's need for assistive devices and provide as appropriate  - Encourage maximum independence but intervene and supervise when necessary  - Involve family in performance of ADLs  - Assess for home care needs following discharge   - Consider OT consult to assist with ADL evaluation and planning for discharge  - Provide patient education as appropriate  Outcome: Progressing     Problem: Prexisting or High Potential for Compromised Skin Integrity  Goal: Skin integrity is maintained or improved  Description: INTERVENTIONS:  - Identify patients at risk for skin breakdown  - Assess and monitor skin integrity  - Assess and monitor nutrition and hydration status  - Monitor labs   - Assess for incontinence   - Turn and reposition patient  - Assist with mobility/ambulation  - Relieve pressure over bony prominences  - Avoid friction and shearing  - Provide appropriate hygiene as needed including keeping skin clean and dry  - Evaluate need for skin moisturizer/barrier cream  - Collaborate with interdisciplinary team   - Patient/family teaching  - Consider wound care consult   Outcome: Progressing     Problem: Nutrition/Hydration-ADULT  Goal: Nutrient/Hydration intake appropriate for improving, restoring or maintaining nutritional needs  Description: Monitor and assess patient's nutrition/hydration status for malnutrition  Collaborate with interdisciplinary team and initiate plan and interventions as ordered  Monitor patient's weight and dietary intake as ordered or per policy  Utilize nutrition screening tool and intervene as necessary  Determine patient's food preferences and provide high-protein, high-caloric foods as appropriate       INTERVENTIONS:  - Monitor oral intake, urinary output, labs, and treatment plans  - Assess nutrition and hydration status and recommend course of action  - Evaluate amount of meals eaten  - Assist patient with eating if necessary   - Allow adequate time for meals  - Recommend/ encourage appropriate diets, oral nutritional supplements, and vitamin/mineral supplements  - Order, calculate, and assess calorie counts as needed  - Recommend, monitor, and adjust tube feedings and TPN/PPN based on assessed needs  - Assess need for intravenous fluids  - Provide specific nutrition/hydration education as appropriate  - Include patient/family/caregiver in decisions related to nutrition  Outcome: Progressing

## 2023-05-04 NOTE — RESPIRATORY THERAPY NOTE
05/04/23 0713   Respiratory Assessment   Assessment Type Pre-treatment   General Appearance Drowsy   Respiratory Pattern Labored   Chest Assessment Chest expansion symmetrical   Bilateral Breath Sounds Diminished;Scattered;Crackles  (crackles in the bases)   Cough Congested;Non-productive   Resp Comments Pt resting comfortably w/ mild labored respirations  States breathing is not that bad today  Pt transitioned to nasal cannula at this time  Will wean as able  Tx given     O2 Device NC   Oxygen Therapy/Pulse Ox   O2 Device (S)  Nasal cannula   O2 Therapy Oxygen humidified   Nasal Cannula O2 Flow Rate (L/min) 6 L/min   Calculated FIO2 (%) - Nasal Cannula 44   SpO2 91 %   SpO2 Activity At Rest   $ Pulse Oximetry Spot Check Charge Completed

## 2023-05-04 NOTE — ASSESSMENT & PLAN NOTE
· Patient with baseline anemia around 8-9  · Hemoglobin currently 9 1  · May be hemoconcentrated  · Continue to monitor    Lab Results   Component Value Date    HGB 9 1 (L) 05/04/2023    HGB 10 2 (L) 05/03/2023    HGB 9 2 (L) 05/02/2023

## 2023-05-04 NOTE — CASE MANAGEMENT
Case Management Discharge Planning Note    Patient name Dar Barbosa  Location [address] 210/-[id number] MRN [id number]  : 1945 Date 2023       Current Admission Date: 2023  Current Admission Diagnosis:Chronic obstructive pulmonary disease with acute exacerbation [facility])   Patient Active Problem List    Diagnosis Date Noted    Moderate protein-calorie malnutrition ([facility] [address] ) 2023    BRBPR (bright red blood per rectum) 2023    Generalized weakness 2023    Staring episodes 2023    Waldenstrom macroglobulinemia ([facility] [address] ) 2023    Severe protein-calorie malnutrition ([facility] [address] ) 2021    Positive blood culture 2021    Chronic obstructive pulmonary disease with acute exacerbation ([facility] [address] ) 2021    SIRS (systemic inflammatory response syndrome) ([facility] [address] ) 2021    Anemia 2021    Abnormal computed tomography angiography (CTA) 2021    Fatigue 2021    Major depressive disorder, recurrent episode, moderate ([address] ) 2019    Flank pain 2019    Pneumonia 2019    Sepsis ([facility] [address] ) 2019    CAD (coronary artery disease), native coronary artery 2018    Chronic respiratory failure with hypoxia ([facility] [address] ) 2018    Ambulatory dysfunction 2018    Hypokalemia 2018    Essential hypertension 2018    Tobacco abuse 2018      LOS (days): 3  Geometric Mean LOS (GMLOS) (days): 2 70  Days to GMLOS:-0 3     OBJECTIVE:  Risk of Unplanned Readmission Score: 29 45         Current admission status: Inpatient   Preferred Pharmacy:   [facility]/pharmacy #[id number]- [place] - [address]  Phone: [phone] Fax: [phone], [address] [place] [address]  [address] [place] [place] [address]  Phone: [phone] Fax: [phone]    Primary Care Provider: Lisette Goodson MD    Primary Insurance: MEDICARE  Secondary Insurance: [place] WELLNESS [facility]    DISCHARGE DETAILS:    Other Referral/Resources/Interventions Provided:  Interventions: Short Term Rehab  Referral Comments: Per rounding with [facility], patient is having a video barium swallow done today, and she is up to 6L of O2  Anticipated to be discharged in 24 hours  CM informed [address] of the same via A[place]

## 2023-05-04 NOTE — ASSESSMENT & PLAN NOTE
alnutrition Findings:   Adult Malnutrition type: Chronic illness  Adult Degree of Malnutrition: Malnutrition of moderate degree  Malnutrition Characteristics: Muscle loss, Fat loss      360 Statement: Related to catabolic illness as evidenced by mild depletion of body fat (Orbitals) and mild depletion of muscle mass (Temples) treated with diet and oral nutrition supplements  BMI Findings: Body mass index is 13 92 kg/m²

## 2023-05-05 VITALS
SYSTOLIC BLOOD PRESSURE: 131 MMHG | TEMPERATURE: 98 F | OXYGEN SATURATION: 93 % | BODY MASS INDEX: 13.94 KG/M2 | HEART RATE: 81 BPM | WEIGHT: 88.85 LBS | HEIGHT: 67 IN | DIASTOLIC BLOOD PRESSURE: 68 MMHG | RESPIRATION RATE: 18 BRPM

## 2023-05-05 LAB
ANION GAP SERPL CALCULATED.3IONS-SCNC: 4 MMOL/L (ref 4–13)
BUN SERPL-MCNC: 19 MG/DL (ref 5–25)
CALCIUM SERPL-MCNC: 9 MG/DL (ref 8.4–10.2)
CHLORIDE SERPL-SCNC: 97 MMOL/L (ref 96–108)
CO2 SERPL-SCNC: 39 MMOL/L (ref 21–32)
CREAT SERPL-MCNC: 0.73 MG/DL (ref 0.6–1.3)
ERYTHROCYTE [DISTWIDTH] IN BLOOD BY AUTOMATED COUNT: 15.9 % (ref 11.6–15.1)
GFR SERPL CREATININE-BSD FRML MDRD: 80 ML/MIN/1.73SQ M
GLUCOSE SERPL-MCNC: 91 MG/DL (ref 65–140)
HCT VFR BLD AUTO: 28.4 % (ref 34.8–46.1)
HGB BLD-MCNC: 8.6 G/DL (ref 11.5–15.4)
MCH RBC QN AUTO: 29 PG (ref 26.8–34.3)
MCHC RBC AUTO-ENTMCNC: 30.3 G/DL (ref 31.4–37.4)
MCV RBC AUTO: 96 FL (ref 82–98)
PLATELET # BLD AUTO: 196 THOUSANDS/UL (ref 149–390)
PMV BLD AUTO: 9.2 FL (ref 8.9–12.7)
POTASSIUM SERPL-SCNC: 3.7 MMOL/L (ref 3.5–5.3)
RBC # BLD AUTO: 2.97 MILLION/UL (ref 3.81–5.12)
SODIUM SERPL-SCNC: 140 MMOL/L (ref 135–147)
WBC # BLD AUTO: 11.68 THOUSAND/UL (ref 4.31–10.16)

## 2023-05-05 RX ORDER — PREDNISONE 10 MG/1
TABLET ORAL
Qty: 30 TABLET | Refills: 0
Start: 2023-05-05 | End: 2023-05-17

## 2023-05-05 RX ORDER — LEVALBUTEROL INHALATION SOLUTION 1.25 MG/3ML
1.25 SOLUTION RESPIRATORY (INHALATION)
Qty: 270 ML | Refills: 0
Start: 2023-05-05 | End: 2023-06-04

## 2023-05-05 RX ORDER — HYDROCORTISONE 25 MG/G
CREAM TOPICAL 2 TIMES DAILY
Qty: 28 G | Refills: 0
Start: 2023-05-05

## 2023-05-05 RX ORDER — AMOXICILLIN AND CLAVULANATE POTASSIUM 875; 125 MG/1; MG/1
1 TABLET, FILM COATED ORAL EVERY 12 HOURS SCHEDULED
Qty: 6 TABLET | Refills: 0
Start: 2023-05-05 | End: 2023-05-08

## 2023-05-05 RX ORDER — HYDROXYZINE HYDROCHLORIDE 25 MG/1
25 TABLET, FILM COATED ORAL EVERY 6 HOURS PRN
Refills: 0
Start: 2023-05-05

## 2023-05-05 RX ADMIN — SODIUM CHLORIDE 3 G: 900 INJECTION, SOLUTION INTRAVENOUS at 05:39

## 2023-05-05 RX ADMIN — BUDESONIDE AND FORMOTEROL FUMARATE DIHYDRATE 2 PUFF: 160; 4.5 AEROSOL RESPIRATORY (INHALATION) at 08:29

## 2023-05-05 RX ADMIN — IPRATROPIUM BROMIDE 0.5 MG: 0.5 SOLUTION RESPIRATORY (INHALATION) at 07:07

## 2023-05-05 RX ADMIN — HYDROCODONE BITARTRATE AND ACETAMINOPHEN 1 TABLET: 5; 325 TABLET ORAL at 09:38

## 2023-05-05 RX ADMIN — VILAZODONE HYDROCHLORIDE 40 MG: 20 TABLET ORAL at 08:29

## 2023-05-05 RX ADMIN — LEVALBUTEROL HYDROCHLORIDE 1.25 MG: 1.25 SOLUTION RESPIRATORY (INHALATION) at 07:07

## 2023-05-05 RX ADMIN — GUAIFENESIN 600 MG: 600 TABLET ORAL at 08:26

## 2023-05-05 RX ADMIN — PREDNISONE 40 MG: 20 TABLET ORAL at 08:26

## 2023-05-05 RX ADMIN — LEVALBUTEROL HYDROCHLORIDE 1.25 MG: 1.25 SOLUTION RESPIRATORY (INHALATION) at 13:34

## 2023-05-05 RX ADMIN — FUROSEMIDE 20 MG: 20 TABLET ORAL at 08:26

## 2023-05-05 RX ADMIN — ACETAMINOPHEN 650 MG: 325 TABLET ORAL at 14:24

## 2023-05-05 RX ADMIN — CARVEDILOL 25 MG: 12.5 TABLET, FILM COATED ORAL at 08:26

## 2023-05-05 RX ADMIN — LISINOPRIL 20 MG: 20 TABLET ORAL at 08:26

## 2023-05-05 RX ADMIN — HYDROCHLOROTHIAZIDE 25 MG: 25 TABLET ORAL at 08:26

## 2023-05-05 RX ADMIN — FERROUS SULFATE TAB 325 MG (65 MG ELEMENTAL FE) 325 MG: 325 (65 FE) TAB at 08:26

## 2023-05-05 RX ADMIN — IPRATROPIUM BROMIDE 0.5 MG: 0.5 SOLUTION RESPIRATORY (INHALATION) at 13:34

## 2023-05-05 RX ADMIN — ASPIRIN 81 MG: 81 TABLET, COATED ORAL at 08:26

## 2023-05-05 RX ADMIN — BUPROPION HYDROCHLORIDE TABLETS 75 MG: 75 TABLET, FILM COATED ORAL at 08:26

## 2023-05-05 NOTE — DISCHARGE SUMMARY
[address] [facility]  Discharge- Twin Rodriguez 1945, 69 y o  female MRN: [id number]  Unit/Bed#: -01 Encounter: [id number]  Primary Care Provider: Irving Marie MD   Date and time admitted to hospital: 4/30/2023  9:14 AM    * Chronic obstructive pulmonary disease with acute exacerbation (HCC)  Assessment & Plan  · Continue 3-4 L nasal canula   · Continue Unasyn      BRBPR (bright red blood per rectum)  Assessment & Plan  · Continue ASA  · Follow-up with GI for outpatient flex sigmoidoscopy    Chronic respiratory failure with hypoxia (HCC)  Assessment & Plan  · Chronically on 3-4 L nasal cannula  · Continue Unasyn    Hypokalemia  Assessment & Plan  · Resolved    Anemia  Assessment & Plan  · Patient with baseline anemia around 8-9  · 5/5: Hemoglobin 8 6      Tobacco abuse  Assessment & Plan  · Discussed importance of smoking cessation for greater than 3 minutes    Moderate protein-calorie malnutrition (HCC)  Assessment & Plan  Malnutrition Findings:   Adult Malnutrition type: Chronic illness  Adult Degree of Malnutrition: Malnutrition of moderate degree  Malnutrition Characteristics: Muscle loss, Fat loss      360 Statement: Related to catabolic illness as evidenced by mild depletion of body fat (Orbitals) and mild depletion of muscle mass (Temples) treated with diet and oral nutrition supplements  BMI Findings: Body mass index is 13 92 kg/m²       Medical Problems     Resolved Problems  Date Reviewed: 3/10/2023   None       Discharging Physician / Practitioner: Ludy Chiang  PCP: Irving Marie MD  Admission Date:   Admission Orders (From admission, onward)     Ordered        05/01/23 1425  Inpatient Admission  Once            04/30/23 1430  Place in Observation  Once                      Discharge Date: 05/05/23    Consultations During Hospital Stay:  [id number] [facility]  · Consult to Physical Therapy  · Consult to Occupational Therapy  · Consult to Speech Pathology  · Consult to Respiratory Therapy    Procedures Performed:   · FL Barium Swallow Video With Speech (5/4/23): Minimal oral dysphagia characterized by reduced tongue control and posterior loss of the bolus to the level of the valleculae during mastication  Mild-moderate pharyngeal dysphagia characterized by delayed swallow response with spillage to the valleculae and pyriforms  Aspiration silent in nature as patient became short of breath but elicited no protective cough at any point    Significant Findings / Test Results:   · CTA Chest (4/30/23): Emphysema  Chronic bronchitis  Tree-in-bud opacities throughout both lungs, most likely due to bronchiolitis  · Chest X-ray (5/3/23): Right lung base opacity which may reflect atelectasis or pneumonia  Incidental Findings: None    Test Results Pending at Discharge (will require follow up):   · No     Outpatient Tests Requested:  · Follow-up with GI for outpatient flex sigmoidoscopy  · Follow-up with PT/OT for post acute rehabilitation services    Complications:  No    Reason for Admission: shortness of breath    Hospital Course:   Lexi Rubio is a 69 y o  female patient with PMH of HTN, COPD, and CAD who originally presented to the hospital on 4/30/2023 due to worsening shortness of breath x a few days  CXR and CTA chest were performed  Patient was diagnosed with COPD with acute exacerbation  Administered nebulizer as needed and IV steroids  Per nursing report, the night of 4/30 patient had 2 episodes of bright red blood per rectum  GI was consulted and scheduled a colonoscopy  The night of 5/2 patient had an aspiration episode while on bowel prep requiring 15L O2 overnight  Patient was started on Unasyn and Speech Pathology was consulted  Barium Swallow demonstrated silent aspiration  Patient's symptoms improved with Unasyn and she returned to her baseline of 4L O2 nasally with no additional exacerbations   GI recommends a flex sigmoidoscopy be performed "outpatient  Please see above list of diagnoses and related plan for additional information  Condition at Discharge: stable    Discharge Day Visit / Exam:   Subjective: She is lying in bed resting comfortably she is eating her lunch she offers no complaints at      Vitals: Blood Pressure: 131/68 (05/05/23 0826)  Pulse: 81 (05/05/23 0708)  Temperature: 98 °F (36 7 °C) (05/05/23 0708)  Temp Source: Oral (05/05/23 0708)  Respirations: 18 (05/05/23 0708)  Height: 5' 7\" (170 2 cm) (04/30/23 1542)  Weight - Scale: 40 3 kg (88 lb 13 5 oz) (05/04/23 0600)  SpO2: 90 % (05/05/23 0708)  Exam:   Physical Exam  Vitals reviewed  Cardiovascular:      Rate and Rhythm: Normal rate  Pulmonary:      Effort: No respiratory distress  Abdominal:      Palpations: Abdomen is soft  Musculoskeletal:         General: Normal range of motion  Skin:     General: Skin is warm  Neurological:      Mental Status: She is alert  Mental status is at baseline  Psychiatric:         Mood and Affect: Mood normal            Discussion with Family: Updated  (son) via phone  Discharge instructions/Information to patient and family:   See after visit summary for information provided to patient and family  Provisions for Follow-Up Care:  See after visit summary for information related to follow-up care and any pertinent home health orders  Disposition:   Other: Bj otto  Planned Readmission: No     Discharge Statement:  I spent 65 minutes discharging the patient  This time was spent on the day of discharge  I had direct contact with the patient on the day of discharge  Greater than 50% of the total time was spent examining patient, answering all patient questions, arranging and discussing plan of care with patient as well as directly providing post-discharge instructions  Additional time then spent on discharge activities      Discharge Medications:  See after visit summary for reconciled discharge medications " provided to patient and/or family        **Please Note: This note may have been constructed using a voice recognition system**

## 2023-05-05 NOTE — PLAN OF CARE
Problem: INFECTION - ADULT  Goal: Absence or prevention of progression during hospitalization  Description: INTERVENTIONS:  - Assess and monitor for signs and symptoms of infection  - Monitor lab/diagnostic results  - Monitor all insertion sites, i e  indwelling lines, tubes, and drains  - Monitor endotracheal if appropriate and nasal secretions for changes in amount and color  - [place] appropriate cooling/warming therapies per order  - Administer medications as ordered  - Instruct and encourage patient and family to use good hand hygiene technique  - Identify and instruct in appropriate isolation precautions for identified infection/condition  Outcome: Progressing  Goal: Absence of fever/infection during neutropenic period  Description: INTERVENTIONS:  - Monitor WBC    Outcome: Progressing     Problem: PAIN - ADULT  Goal: Verbalizes/displays adequate comfort level or baseline comfort level  Description: Interventions:  - Encourage patient to monitor pain and request assistance  - Assess pain using appropriate pain scale  - Administer analgesics based on type and severity of pain and evaluate response  - Implement non-pharmacological measures as appropriate and evaluate response  - Consider cultural and social influences on pain and pain management  - Notify physician/advanced practitioner if interventions unsuccessful or patient reports new pain  Outcome: Progressing

## 2023-05-05 NOTE — PLAN OF CARE
Problem: Potential for Falls  Goal: Patient will remain free of falls  Description: INTERVENTIONS:  - Educate patient/family on patient safety including physical limitations  - Instruct patient to call for assistance with activity   - Consult OT/PT to assist with strengthening/mobility   - Keep Call bell within reach  - Keep bed low and locked with side rails adjusted as appropriate  - Keep care items and personal belongings within reach  - Initiate and maintain comfort rounds  - Make Fall Risk Sign visible to staff  - Offer Toileting every 2 Hours, in advance of need  - Initiate/Maintain bed alarm  - Obtain necessary fall risk management equipment  - Apply yellow socks and bracelet for high fall risk patients  - Consider moving patient to room near nurses station  Outcome: Progressing     Problem: PAIN - ADULT  Goal: Verbalizes/displays adequate comfort level or baseline comfort level  Description: Interventions:  - Encourage patient to monitor pain and request assistance  - Assess pain using appropriate pain scale  - Administer analgesics based on type and severity of pain and evaluate response  - Implement non-pharmacological measures as appropriate and evaluate response  - Consider cultural and social influences on pain and pain management  - Notify physician/advanced practitioner if interventions unsuccessful or patient reports new pain  Outcome: Progressing     Problem: INFECTION - ADULT  Goal: Absence or prevention of progression during hospitalization  Description: INTERVENTIONS:  - Assess and monitor for signs and symptoms of infection  - Monitor lab/diagnostic results  - Monitor all insertion sites, i e  indwelling lines, tubes, and drains  - Monitor endotracheal if appropriate and nasal secretions for changes in amount and color  - [place] appropriate cooling/warming therapies per order  - Administer medications as ordered  - Instruct and encourage patient and family to use good hand hygiene technique  - Identify and instruct in appropriate isolation precautions for identified infection/condition  Outcome: Progressing  Goal: Absence of fever/infection during neutropenic period  Description: INTERVENTIONS:  - Monitor WBC    Outcome: Progressing     Problem: SAFETY ADULT  Goal: Patient will remain free of falls  Description: INTERVENTIONS:  - Educate patient/family on patient safety including physical limitations  - Instruct patient to call for assistance with activity   - Consult OT/PT to assist with strengthening/mobility   - Keep Call bell within reach  - Keep bed low and locked with side rails adjusted as appropriate  - Keep care items and personal belongings within reach  - Initiate and maintain comfort rounds  - Make Fall Risk Sign visible to staff  - Offer Toileting every 2 Hours, in advance of need  - Initiate/Maintain bed alarm  - Obtain necessary fall risk management equipment  - Apply yellow socks and bracelet for high fall risk patients  - Consider moving patient to room near nurses station  Outcome: Progressing  Goal: Maintain or return to baseline ADL function  Description: INTERVENTIONS:  -  Assess patient's ability to carry out ADLs; assess patient's baseline for ADL function and identify physical deficits which impact ability to perform ADLs (bathing, care of mouth/teeth, toileting, grooming, dressing, etc )  - Assess/evaluate cause of self-care deficits   - Assess range of motion  - Assess patient's mobility; develop plan if impaired  - Assess patient's need for assistive devices and provide as appropriate  - Encourage maximum independence but intervene and supervise when necessary  - Involve family in performance of ADLs  - Assess for home care needs following discharge   - Consider OT consult to assist with ADL evaluation and planning for discharge  - Provide patient education as appropriate  Outcome: Progressing  Goal: Maintains/Returns to pre admission functional level  Description: INTERVENTIONS:  - Perform BMAT or MOVE assessment daily    - Set and communicate daily mobility goal to care team and patient/family/caregiver  - Collaborate with rehabilitation services on mobility goals if consulted  - Perform Range of Motion 3 times a day  - Reposition patient every 2 hours  - Dangle patient 3 times a day  - Stand patient 3 times a day  - Ambulate patient 3 times a day  - Out of bed to chair 3 times a day   - Out of bed for meals 3 times a day  - Out of bed for toileting  - Record patient progress and toleration of activity level   Outcome: Progressing     Problem: DISCHARGE PLANNING  Goal: Discharge to home or other facility with appropriate resources  Description: INTERVENTIONS:  - Identify barriers to discharge w/patient and caregiver  - Arrange for needed discharge resources and transportation as appropriate  - Identify discharge learning needs (meds, wound care, etc )  - Arrange for interpretive services to assist at discharge as needed  - Refer to Case Management Department for coordinating discharge planning if the patient needs post-hospital services based on physician/advanced practitioner order or complex needs related to functional status, cognitive ability, or social support system  Outcome: Progressing     Problem: Knowledge Deficit  Goal: Patient/family/caregiver demonstrates understanding of disease process, treatment plan, medications, and discharge instructions  Description: Complete learning assessment and assess knowledge base    Interventions:  - Provide teaching at level of understanding  - Provide teaching via preferred learning methods  Outcome: Progressing     Problem: MOBILITY - ADULT  Goal: Maintain or return to baseline ADL function  Description: INTERVENTIONS:  -  Assess patient's ability to carry out ADLs; assess patient's baseline for ADL function and identify physical deficits which impact ability to perform ADLs (bathing, care of mouth/teeth, toileting, grooming, dressing, etc )  - Assess/evaluate cause of self-care deficits   - Assess range of motion  - Assess patient's mobility; develop plan if impaired  - Assess patient's need for assistive devices and provide as appropriate  - Encourage maximum independence but intervene and supervise when necessary  - Involve family in performance of ADLs  - Assess for home care needs following discharge   - Consider OT consult to assist with ADL evaluation and planning for discharge  - Provide patient education as appropriate  Outcome: Progressing  Goal: Maintains/Returns to pre admission functional level  Description: INTERVENTIONS:  - Perform BMAT or MOVE assessment daily    - Set and communicate daily mobility goal to care team and patient/family/caregiver  - Collaborate with rehabilitation services on mobility goals if consulted  - Perform Range of Motion 3 times a day  - Reposition patient every 2 hours    - Dangle patient 3 times a day  - Stand patient 3 times a day  - Ambulate patient 3 times a day  - Out of bed to chair 3 times a day   - Out of bed for meals 3 times a day  - Out of bed for toileting  - Record patient progress and toleration of activity level   Outcome: Progressing     Problem: Prexisting or High Potential for Compromised Skin Integrity  Goal: Skin integrity is maintained or improved  Description: INTERVENTIONS:  - Identify patients at risk for skin breakdown  - Assess and monitor skin integrity  - Assess and monitor nutrition and hydration status  - Monitor labs   - Assess for incontinence   - Turn and reposition patient  - Assist with mobility/ambulation  - Relieve pressure over bony prominences  - Avoid friction and shearing  - Provide appropriate hygiene as needed including keeping skin clean and dry  - Evaluate need for skin moisturizer/barrier cream  - Collaborate with interdisciplinary team   - Patient/family teaching  - Consider wound care consult   Outcome: Progressing     Problem: Nutrition/Hydration-ADULT  Goal: Nutrient/Hydration intake appropriate for improving, restoring or maintaining nutritional needs  Description: Monitor and assess patient's nutrition/hydration status for malnutrition  Collaborate with interdisciplinary team and initiate plan and interventions as ordered  Monitor patient's weight and dietary intake as ordered or per policy  Utilize nutrition screening tool and intervene as necessary  Determine patient's food preferences and provide high-protein, high-caloric foods as appropriate       INTERVENTIONS:  - Monitor oral intake, urinary output, labs, and treatment plans  - Assess nutrition and hydration status and recommend course of action  - Evaluate amount of meals eaten  - Assist patient with eating if necessary   - Allow adequate time for meals  - Recommend/ encourage appropriate diets, oral nutritional supplements, and vitamin/mineral supplements  - Order, calculate, and assess calorie counts as needed  - Recommend, monitor, and adjust tube feedings and TPN/PPN based on assessed needs  - Assess need for intravenous fluids  - Provide specific nutrition/hydration education as appropriate  - Include patient/family/caregiver in decisions related to nutrition  Outcome: Progressing

## 2023-05-05 NOTE — CASE MANAGEMENT
Case Management Progress Note    Patient name Sameer Wagoner  Location [address] 210/-01 MRN [id number]  : 1945 Date 2023       LOS (days): 4  Geometric Mean LOS (GMLOS) (days): 2 70  Days to GMLOS:-1 3        OBJECTIVE:        Current admission status: Inpatient  Preferred Pharmacy:   [facility]/pharmacy [address] [place] [address] [address]  Phone: [phone] Fax: [phone]    [address] [place] [address], [address] [facility] [address]  Phone: [phone] Fax: [phone]    Primary Care Provider: Zacarias Medina MD    Primary Insurance: MEDICARE  Secondary Insurance: [address] [place] [address] NOTE:IMM reviewed with patient and caregiver, patient and caregiver agrees with discharge determination

## 2023-05-05 NOTE — CASE MANAGEMENT
Case Management Discharge Planning Note    Patient name Violette Parrish  Location [address] 210/-90 MRN [id number]  : 1945 Date 2023       Current Admission Date: 2023  Current Admission Diagnosis:Chronic obstructive pulmonary disease with acute exacerbation [facility])   Patient Active Problem List    Diagnosis Date Noted    Moderate protein-calorie malnutrition ([address] ) 2023    BRBPR (bright red blood per rectum) 2023    Generalized weakness 2023    Staring episodes 2023    Waldenstrom macroglobulinemia ([facility] ) 2023    Severe protein-calorie malnutrition ([facility] ) 2021    Positive blood culture 2021    Chronic obstructive pulmonary disease with acute exacerbation ([facility] ) 2021    SIRS (systemic inflammatory response syndrome) ([facility] ) 2021    Anemia 2021    Abnormal computed tomography angiography (CTA) 2021    Fatigue 2021    Major depressive disorder, recurrent episode, moderate ([address] ) 2019    Flank pain 2019    Pneumonia 2019    Sepsis ([facility] ) 2019    CAD (coronary artery disease), native coronary artery 2018    Chronic respiratory failure with hypoxia ([facility] ) 2018    Ambulatory dysfunction 2018    Hypokalemia 2018    Essential hypertension 2018    Tobacco abuse 2018      LOS (days): 4  Geometric Mean LOS (GMLOS) (days): 2 70  Days to GMLOS:-1 3     OBJECTIVE:  Risk of Unplanned Readmission Score: 29 55         Current admission status: Inpatient   Preferred Pharmacy:   [id number]- [place] - [address] [facility] [address] [address]  Phone: [phone] Fax: [phone], [address]  [address] [address]  Phone: [phone] Fax: [phone]    Primary Care Provider: Miky Torres MD    Primary Insurance: MEDICARE  Secondary Insurance: [place] [facility]    DISCHARGE DETAILS:    Discharge planning discussed with[de-identified] pt at bedside and son Damian Robledo via phone  Freedom of Choice: Yes  Comments - Freedom of Choice: Pt to be discharged to [address] [place] [address] with transport arranged for 15:00  Son Damian Robledo and pt are agreeable to this dcp  CM contacted family/caregiver?: Yes  Were Treatment Team discharge recommendations reviewed with patient/caregiver?: Yes  Did patient/caregiver verbalize understanding of patient care needs?: Yes  Were patient/caregiver advised of the risks associated with not following Treatment Team discharge recommendations?: Yes    Contacts  Patient Contacts: Damian Robledo  Relationship to Patient[de-identified] Family  Contact Method: Phone  Phone Number: on facesheet  Reason/Outcome: Discharge [address]         Is the patient interested in [place] [address] at discharge?: No    DME Referral Provided  Referral made for DME?: No    Other Referral/Resources/Interventions Provided:  Interventions: Short Term Rehab  Referral Comments: [facility] informed of transport time along with SLIM and Nursing  It was determined that pt does not need a colonoscopy, but will have an OP flex sig per SLIM    No signs of bleeding-pt has been stable for days and is cleared to return to [address] [place] [address] today    Would you like to participate in our [address] service program?  : No - Declined             Dispatcher Contacted: Yes  Number/Name of Dispatcher: Roundtrip  Transported by [employer] and Unit #): SLETs  ETA of Transport (Date): 05/05/23  ETA of Transport (Time): 1500              IMM Given (Date):: 05/05/23  IMM Given to[de-identified] Patient  Family notified[de-identified] fadia Mcgill Name, [address] : [address] [place] [address]  Receiving Facility/Agency Phone Number: [phone]  Facility/Agency Fax Number: [phone]

## 2023-05-05 NOTE — ASSESSMENT & PLAN NOTE
Malnutrition Findings:   Adult Malnutrition type: Chronic illness  Adult Degree of Malnutrition: Malnutrition of moderate degree  Malnutrition Characteristics: Muscle loss, Fat loss      360 Statement: Related to catabolic illness as evidenced by mild depletion of body fat (Orbit[place]) and mild depletion of muscle mass ([place]) treated with diet and oral nutrition supplements  BMI Findings: Body mass index is 13 92 kg/m²

## 2023-05-05 NOTE — SPEECH THERAPY NOTE
Speech Language/Pathology    Speech/Language Pathology Progress Note    Patient Name: Starr Kaur  GNCSD'Z Date: 5/5/2023     Subjective:  Patient reports she does not like the nectar thick but understands why she needs it  Objective:  Patient able to recall majority of VBS results  Results were reviewed with her in detail as well as recommendations for diet/stratgeies ( this was also written)  Additionally, information was provided ( and written) on types of thickener and where to acquire them  Reciprocal comprehension was verbally expressed  She consumed yogurt with no sxs of aspiration today  Assessment:  Swallow function likely unchanged  Patient reports she is happy with the food- does not like the  Thickener very much but expresses understanding as to why she needs it  Plan/Recommendations:  Continue current diet  Monitor for dehydration      Will f/u    MILI Elizabeth S , [address] [facility]  Speech Language Pathologist   Available via [address] [place] [address] #[id number]  [place] #[id number]

## 2023-05-06 LAB
BACTERIA BLD CULT: NORMAL
BACTERIA BLD CULT: NORMAL

## 2023-10-28 ENCOUNTER — HOSPITAL ENCOUNTER (INPATIENT)
Facility: HOSPITAL | Age: 78
LOS: 5 days | Discharge: HOME | DRG: 377 | End: 2023-11-03
Attending: EMERGENCY MEDICINE | Admitting: INTERNAL MEDICINE
Payer: MEDICARE

## 2023-10-28 ENCOUNTER — APPOINTMENT (EMERGENCY)
Dept: RADIOLOGY | Facility: HOSPITAL | Age: 78
DRG: 377 | End: 2023-10-28
Payer: MEDICARE

## 2023-10-28 ENCOUNTER — APPOINTMENT (EMERGENCY)
Dept: CT IMAGING | Facility: HOSPITAL | Age: 78
DRG: 377 | End: 2023-10-28
Payer: MEDICARE

## 2023-10-28 DIAGNOSIS — I10 ESSENTIAL HYPERTENSION: ICD-10-CM

## 2023-10-28 DIAGNOSIS — K62.5 BRBPR (BRIGHT RED BLOOD PER RECTUM): ICD-10-CM

## 2023-10-28 DIAGNOSIS — K57.92 DIVERTICULITIS: Primary | ICD-10-CM

## 2023-10-28 DIAGNOSIS — K62.5 BRIGHT RED BLOOD PER RECTUM: ICD-10-CM

## 2023-10-28 DIAGNOSIS — R53.1 GENERALIZED WEAKNESS: ICD-10-CM

## 2023-10-28 DIAGNOSIS — D64.9 ANEMIA, UNSPECIFIED TYPE: ICD-10-CM

## 2023-10-28 LAB
ALBUMIN SERPL BCP-MCNC: 3.6 G/DL (ref 3.5–5)
ALP SERPL-CCNC: 59 U/L (ref 34–104)
ALT SERPL W P-5'-P-CCNC: 3 U/L (ref 7–52)
ANION GAP SERPL CALCULATED.3IONS-SCNC: 3 MMOL/L
APTT PPP: 28 SECONDS (ref 23–37)
AST SERPL W P-5'-P-CCNC: 9 U/L (ref 13–39)
BASOPHILS # BLD AUTO: 0.07 THOUSANDS/ÂΜL (ref 0–0.1)
BASOPHILS NFR BLD AUTO: 1 % (ref 0–1)
BILIRUB SERPL-MCNC: 0.2 MG/DL (ref 0.2–1)
BNP SERPL-MCNC: 22 PG/ML (ref 0–100)
BUN SERPL-MCNC: 33 MG/DL (ref 5–25)
CALCIUM SERPL-MCNC: 8.9 MG/DL (ref 8.4–10.2)
CARDIAC TROPONIN I PNL SERPL HS: 6 NG/L
CHLORIDE SERPL-SCNC: 101 MMOL/L (ref 96–108)
CO2 SERPL-SCNC: 38 MMOL/L (ref 21–32)
CREAT SERPL-MCNC: 1.07 MG/DL (ref 0.6–1.3)
EOSINOPHIL # BLD AUTO: 0.58 THOUSAND/ÂΜL (ref 0–0.61)
EOSINOPHIL NFR BLD AUTO: 4 % (ref 0–6)
ERYTHROCYTE [DISTWIDTH] IN BLOOD BY AUTOMATED COUNT: 13.3 % (ref 11.6–15.1)
FLUAV RNA RESP QL NAA+PROBE: NEGATIVE
FLUBV RNA RESP QL NAA+PROBE: NEGATIVE
GFR SERPL CREATININE-BSD FRML MDRD: 50 ML/MIN/1.73SQ M
GLUCOSE SERPL-MCNC: 113 MG/DL (ref 65–140)
HCT VFR BLD AUTO: 21.8 % (ref 34.8–46.1)
HGB BLD-MCNC: 7 G/DL (ref 11.5–15.4)
IMM GRANULOCYTES # BLD AUTO: 0.06 THOUSAND/UL (ref 0–0.2)
IMM GRANULOCYTES NFR BLD AUTO: 0 % (ref 0–2)
INR PPP: 1.01 (ref 0.84–1.19)
LIPASE SERPL-CCNC: 15 U/L (ref 11–82)
LYMPHOCYTES # BLD AUTO: 7.65 THOUSANDS/ÂΜL (ref 0.6–4.47)
LYMPHOCYTES NFR BLD AUTO: 55 % (ref 14–44)
MCH RBC QN AUTO: 31.1 PG (ref 26.8–34.3)
MCHC RBC AUTO-ENTMCNC: 32.1 G/DL (ref 31.4–37.4)
MCV RBC AUTO: 97 FL (ref 82–98)
MONOCYTES # BLD AUTO: 0.82 THOUSAND/ÂΜL (ref 0.17–1.22)
MONOCYTES NFR BLD AUTO: 6 % (ref 4–12)
NEUTROPHILS # BLD AUTO: 4.66 THOUSANDS/ÂΜL (ref 1.85–7.62)
NEUTS SEG NFR BLD AUTO: 34 % (ref 43–75)
NRBC BLD AUTO-RTO: 0 /100 WBCS
PLATELET # BLD AUTO: 216 THOUSANDS/UL (ref 149–390)
PMV BLD AUTO: 10 FL (ref 8.9–12.7)
POTASSIUM SERPL-SCNC: 3.7 MMOL/L (ref 3.5–5.3)
PROT SERPL-MCNC: 5.8 G/DL (ref 6.4–8.4)
PROTHROMBIN TIME: 13.9 SECONDS (ref 11.6–14.5)
RBC # BLD AUTO: 2.25 MILLION/UL (ref 3.81–5.12)
RSV RNA RESP QL NAA+PROBE: NEGATIVE
SARS-COV-2 RNA RESP QL NAA+PROBE: NEGATIVE
SODIUM SERPL-SCNC: 142 MMOL/L (ref 135–147)
WBC # BLD AUTO: 13.84 THOUSAND/UL (ref 4.31–10.16)

## 2023-10-28 PROCEDURE — 83880 ASSAY OF NATRIURETIC PEPTIDE: CPT

## 2023-10-28 PROCEDURE — 85730 THROMBOPLASTIN TIME PARTIAL: CPT

## 2023-10-28 PROCEDURE — 94640 AIRWAY INHALATION TREATMENT: CPT

## 2023-10-28 PROCEDURE — 36415 COLL VENOUS BLD VENIPUNCTURE: CPT

## 2023-10-28 PROCEDURE — 71046 X-RAY EXAM CHEST 2 VIEWS: CPT

## 2023-10-28 PROCEDURE — 74177 CT ABD & PELVIS W/CONTRAST: CPT

## 2023-10-28 PROCEDURE — 80053 COMPREHEN METABOLIC PANEL: CPT

## 2023-10-28 PROCEDURE — 0241U HB NFCT DS VIR RESP RNA 4 TRGT: CPT

## 2023-10-28 PROCEDURE — 85025 COMPLETE CBC W/AUTO DIFF WBC: CPT

## 2023-10-28 PROCEDURE — G1004 CDSM NDSC: HCPCS

## 2023-10-28 PROCEDURE — 99285 EMERGENCY DEPT VISIT HI MDM: CPT

## 2023-10-28 PROCEDURE — 83690 ASSAY OF LIPASE: CPT

## 2023-10-28 PROCEDURE — 84484 ASSAY OF TROPONIN QUANT: CPT

## 2023-10-28 PROCEDURE — 93005 ELECTROCARDIOGRAM TRACING: CPT

## 2023-10-28 PROCEDURE — 85610 PROTHROMBIN TIME: CPT

## 2023-10-28 RX ORDER — IPRATROPIUM BROMIDE AND ALBUTEROL SULFATE 2.5; .5 MG/3ML; MG/3ML
3 SOLUTION RESPIRATORY (INHALATION)
Status: DISCONTINUED | OUTPATIENT
Start: 2023-10-28 | End: 2023-10-29

## 2023-10-28 RX ADMIN — IOHEXOL 100 ML: 350 INJECTION, SOLUTION INTRAVENOUS at 22:00

## 2023-10-28 RX ADMIN — IPRATROPIUM BROMIDE AND ALBUTEROL SULFATE 3 ML: 2.5; .5 SOLUTION RESPIRATORY (INHALATION) at 21:19

## 2023-10-29 PROBLEM — K57.92 DIVERTICULITIS: Status: ACTIVE | Noted: 2023-10-29

## 2023-10-29 LAB
2HR DELTA HS TROPONIN: 2 NG/L
4HR DELTA HS TROPONIN: 6 NG/L
ABO GROUP BLD: NORMAL
ANION GAP SERPL CALCULATED.3IONS-SCNC: 6 MMOL/L
ATRIAL RATE: 120 BPM
BASOPHILS # BLD AUTO: 0.07 THOUSANDS/ÂΜL (ref 0–0.1)
BASOPHILS NFR BLD AUTO: 1 % (ref 0–1)
BLD GP AB SCN SERPL QL: NEGATIVE
BUN SERPL-MCNC: 28 MG/DL (ref 5–25)
CALCIUM SERPL-MCNC: 8.8 MG/DL (ref 8.4–10.2)
CARDIAC TROPONIN I PNL SERPL HS: 12 NG/L
CARDIAC TROPONIN I PNL SERPL HS: 8 NG/L
CHLORIDE SERPL-SCNC: 100 MMOL/L (ref 96–108)
CO2 SERPL-SCNC: 34 MMOL/L (ref 21–32)
CREAT SERPL-MCNC: 1 MG/DL (ref 0.6–1.3)
EOSINOPHIL # BLD AUTO: 0.42 THOUSAND/ÂΜL (ref 0–0.61)
EOSINOPHIL NFR BLD AUTO: 4 % (ref 0–6)
ERYTHROCYTE [DISTWIDTH] IN BLOOD BY AUTOMATED COUNT: 15 % (ref 11.6–15.1)
GFR SERPL CREATININE-BSD FRML MDRD: 54 ML/MIN/1.73SQ M
GLUCOSE SERPL-MCNC: 102 MG/DL (ref 65–140)
HCT VFR BLD AUTO: 24.6 % (ref 34.8–46.1)
HGB BLD-MCNC: 7.2 G/DL (ref 11.5–15.4)
HGB BLD-MCNC: 7.9 G/DL (ref 11.5–15.4)
HGB BLD-MCNC: 8.3 G/DL (ref 11.5–15.4)
IMM GRANULOCYTES # BLD AUTO: 0.05 THOUSAND/UL (ref 0–0.2)
IMM GRANULOCYTES NFR BLD AUTO: 1 % (ref 0–2)
LYMPHOCYTES # BLD AUTO: 5.75 THOUSANDS/ÂΜL (ref 0.6–4.47)
LYMPHOCYTES NFR BLD AUTO: 51 % (ref 14–44)
MCH RBC QN AUTO: 30.5 PG (ref 26.8–34.3)
MCHC RBC AUTO-ENTMCNC: 33.7 G/DL (ref 31.4–37.4)
MCV RBC AUTO: 90 FL (ref 82–98)
MONOCYTES # BLD AUTO: 0.67 THOUSAND/ÂΜL (ref 0.17–1.22)
MONOCYTES NFR BLD AUTO: 6 % (ref 4–12)
NEUTROPHILS # BLD AUTO: 4.11 THOUSANDS/ÂΜL (ref 1.85–7.62)
NEUTS SEG NFR BLD AUTO: 37 % (ref 43–75)
NRBC BLD AUTO-RTO: 0 /100 WBCS
P AXIS: 81 DEGREES
PLATELET # BLD AUTO: 176 THOUSANDS/UL (ref 149–390)
PMV BLD AUTO: 9.6 FL (ref 8.9–12.7)
POTASSIUM SERPL-SCNC: 3.5 MMOL/L (ref 3.5–5.3)
PR INTERVAL: 142 MS
QRS AXIS: 88 DEGREES
QRSD INTERVAL: 82 MS
QT INTERVAL: 328 MS
QTC INTERVAL: 463 MS
RBC # BLD AUTO: 2.72 MILLION/UL (ref 3.81–5.12)
RH BLD: NEGATIVE
SODIUM SERPL-SCNC: 140 MMOL/L (ref 135–147)
SPECIMEN EXPIRATION DATE: NORMAL
T WAVE AXIS: 63 DEGREES
VENTRICULAR RATE: 120 BPM
WBC # BLD AUTO: 11.07 THOUSAND/UL (ref 4.31–10.16)

## 2023-10-29 PROCEDURE — 93010 ELECTROCARDIOGRAM REPORT: CPT | Performed by: INTERNAL MEDICINE

## 2023-10-29 PROCEDURE — 36415 COLL VENOUS BLD VENIPUNCTURE: CPT

## 2023-10-29 PROCEDURE — 85025 COMPLETE CBC W/AUTO DIFF WBC: CPT | Performed by: INTERNAL MEDICINE

## 2023-10-29 PROCEDURE — 94664 DEMO&/EVAL PT USE INHALER: CPT

## 2023-10-29 PROCEDURE — 84484 ASSAY OF TROPONIN QUANT: CPT

## 2023-10-29 PROCEDURE — 86900 BLOOD TYPING SEROLOGIC ABO: CPT

## 2023-10-29 PROCEDURE — 99223 1ST HOSP IP/OBS HIGH 75: CPT | Performed by: INTERNAL MEDICINE

## 2023-10-29 PROCEDURE — 94760 N-INVAS EAR/PLS OXIMETRY 1: CPT

## 2023-10-29 PROCEDURE — 80048 BASIC METABOLIC PNL TOTAL CA: CPT | Performed by: INTERNAL MEDICINE

## 2023-10-29 PROCEDURE — 86850 RBC ANTIBODY SCREEN: CPT

## 2023-10-29 PROCEDURE — 94640 AIRWAY INHALATION TREATMENT: CPT

## 2023-10-29 PROCEDURE — 85018 HEMOGLOBIN: CPT | Performed by: PHYSICIAN ASSISTANT

## 2023-10-29 PROCEDURE — 86923 COMPATIBILITY TEST ELECTRIC: CPT

## 2023-10-29 PROCEDURE — P9016 RBC LEUKOCYTES REDUCED: HCPCS

## 2023-10-29 PROCEDURE — 86901 BLOOD TYPING SEROLOGIC RH(D): CPT

## 2023-10-29 PROCEDURE — C9113 INJ PANTOPRAZOLE SODIUM, VIA: HCPCS | Performed by: PHYSICIAN ASSISTANT

## 2023-10-29 RX ORDER — PANTOPRAZOLE SODIUM 40 MG/10ML
40 INJECTION, POWDER, LYOPHILIZED, FOR SOLUTION INTRAVENOUS EVERY 12 HOURS
Status: DISCONTINUED | OUTPATIENT
Start: 2023-10-29 | End: 2023-11-03 | Stop reason: HOSPADM

## 2023-10-29 RX ORDER — ALBUTEROL SULFATE 90 UG/1
2 AEROSOL, METERED RESPIRATORY (INHALATION) EVERY 6 HOURS PRN
Status: DISCONTINUED | OUTPATIENT
Start: 2023-10-29 | End: 2023-11-03 | Stop reason: HOSPADM

## 2023-10-29 RX ORDER — VILAZODONE HYDROCHLORIDE 20 MG/1
40 TABLET ORAL
Status: DISCONTINUED | OUTPATIENT
Start: 2023-10-29 | End: 2023-11-03 | Stop reason: HOSPADM

## 2023-10-29 RX ORDER — BUPROPION HYDROCHLORIDE 75 MG/1
75 TABLET ORAL DAILY
Status: DISCONTINUED | OUTPATIENT
Start: 2023-10-29 | End: 2023-11-03 | Stop reason: HOSPADM

## 2023-10-29 RX ORDER — BUDESONIDE AND FORMOTEROL FUMARATE DIHYDRATE 160; 4.5 UG/1; UG/1
2 AEROSOL RESPIRATORY (INHALATION) 2 TIMES DAILY
Status: DISCONTINUED | OUTPATIENT
Start: 2023-10-29 | End: 2023-11-03 | Stop reason: HOSPADM

## 2023-10-29 RX ORDER — CARVEDILOL 12.5 MG/1
25 TABLET ORAL 2 TIMES DAILY WITH MEALS
Status: DISCONTINUED | OUTPATIENT
Start: 2023-10-29 | End: 2023-11-03 | Stop reason: HOSPADM

## 2023-10-29 RX ORDER — POLYETHYLENE GLYCOL 3350 17 G/17G
238 POWDER, FOR SOLUTION ORAL ONCE
Status: COMPLETED | OUTPATIENT
Start: 2023-10-29 | End: 2023-10-29

## 2023-10-29 RX ADMIN — PIPERACILLIN AND TAZOBACTAM 2.25 G: 2; .25 INJECTION, POWDER, LYOPHILIZED, FOR SOLUTION INTRAVENOUS at 01:56

## 2023-10-29 RX ADMIN — VILAZODONE HYDROCHLORIDE 40 MG: 20 TABLET ORAL at 08:17

## 2023-10-29 RX ADMIN — BUDESONIDE AND FORMOTEROL FUMARATE DIHYDRATE 2 PUFF: 160; 4.5 AEROSOL RESPIRATORY (INHALATION) at 08:17

## 2023-10-29 RX ADMIN — CARVEDILOL 25 MG: 12.5 TABLET, FILM COATED ORAL at 08:19

## 2023-10-29 RX ADMIN — IPRATROPIUM BROMIDE 0.5 MG: 0.5 SOLUTION RESPIRATORY (INHALATION) at 19:22

## 2023-10-29 RX ADMIN — PIPERACILLIN AND TAZOBACTAM 2.25 G: 2; .25 INJECTION, POWDER, LYOPHILIZED, FOR SOLUTION INTRAVENOUS at 14:10

## 2023-10-29 RX ADMIN — IPRATROPIUM BROMIDE 0.5 MG: 0.5 SOLUTION RESPIRATORY (INHALATION) at 07:11

## 2023-10-29 RX ADMIN — CARVEDILOL 25 MG: 12.5 TABLET, FILM COATED ORAL at 17:15

## 2023-10-29 RX ADMIN — IPRATROPIUM BROMIDE 0.5 MG: 0.5 SOLUTION RESPIRATORY (INHALATION) at 13:42

## 2023-10-29 RX ADMIN — PANTOPRAZOLE SODIUM 40 MG: 40 INJECTION, POWDER, FOR SOLUTION INTRAVENOUS at 14:32

## 2023-10-29 RX ADMIN — ZANUBRUTINIB 160 MG: 80 CAPSULE, GELATIN COATED ORAL at 17:16

## 2023-10-29 RX ADMIN — BUDESONIDE AND FORMOTEROL FUMARATE DIHYDRATE 2 PUFF: 160; 4.5 AEROSOL RESPIRATORY (INHALATION) at 17:16

## 2023-10-29 RX ADMIN — PIPERACILLIN AND TAZOBACTAM 2.25 G: 2; .25 INJECTION, POWDER, LYOPHILIZED, FOR SOLUTION INTRAVENOUS at 19:36

## 2023-10-29 RX ADMIN — ZANUBRUTINIB 160 MG: 80 CAPSULE, GELATIN COATED ORAL at 08:17

## 2023-10-29 RX ADMIN — BUPROPION HYDROCHLORIDE 75 MG: 75 TABLET, FILM COATED ORAL at 08:15

## 2023-10-29 RX ADMIN — POLYETHYLENE GLYCOL 3350 238 G: 17 POWDER, FOR SOLUTION ORAL at 12:37

## 2023-10-29 RX ADMIN — PIPERACILLIN AND TAZOBACTAM 2.25 G: 2; .25 INJECTION, POWDER, LYOPHILIZED, FOR SOLUTION INTRAVENOUS at 08:00

## 2023-10-29 NOTE — RESPIRATORY THERAPY NOTE
RT Protocol Note  Dodie España 69 y.o. female MRN: [id number]  Unit/Bed#: -01 Encounter: [id number]    Assessment    Principal Problem:    Diverticulitis  Active Problems:    Hypokalemia    Essential hypertension    Chronic respiratory failure with hypoxia (HCC)    Major depressive disorder, recurrent episode, moderate (HCC)    Anemia    Severe protein-calorie malnutrition (HCC)    BRBPR (bright red blood per rectum)      Home Pulmonary Medications:    Home Devices/Therapy: Home O2    Past Medical History:   Diagnosis Date    Acute congestive heart failure ([address]) 2021    Anxiety     Cardiac disease     Chest pain 2021    Chronic pain disorder     COPD (chronic obstructive pulmonary disease) (HCC)     Depression     Heart disease     Hyperlipidemia     Hypertension     MI (myocardial infarction) ([address])     MRSA (methicillin resistant Staphylococcus aureus)     Renal disorder     benign kidney tumor     Social History     Socioeconomic History    Marital status:      Spouse name: None    Number of children: 3    Years of education: 15    Highest education level: High school graduate   Occupational History    Occupation: [address]      Employer: [employer]     Comment: retired    Tobacco Use    Smoking status: Former     Packs/day: 1.00     Years: 60.00     Total pack years: 60.00     Types: Cigarettes     Quit date: 2016     Years since quittin.8    Smokeless tobacco: Never    Tobacco comments:     She has close to a 60 pack-year smoking history, most recently has cut down to 4-5 cigarettes daily   Vaping Use    Vaping Use: Never used   Substance and Sexual Activity    Alcohol use: Never    Drug use: No    Sexual activity: Not Currently   Other Topics Concern    None   Social History Narrative    None     Social Determinants of Health     Financial Resource Strain: Medium Risk (1/3/2020)    Overall Financial Resource Strain (CARDIA)     Difficulty of Paying Living Expenses: Somewhat hard   Food Insecurity: No Food Insecurity (5/1/2023)    Hunger Vital Sign     Worried About Running Out of Food in the Last Year: Never true     Ran Out of Food in the Last Year: Never true   Transportation Needs: No Transportation Needs (5/1/2023)    PRAPARE - Transportation     Lack of Transportation (Medical): No     Lack of Transportation (Non-Medical): No   Physical Activity: Inactive (1/3/2020)    Exercise Vital Sign     Days of Exercise per Week: 0 days     Minutes of Exercise per Session: 0 min   Stress: No Stress Concern Present (1/3/2020)    [facility]     Feeling of Stress : Only a little   Social Connections: Socially Isolated (1/3/2020)    Social Connection and Isolation Panel [NHANES]     Frequency of Communication with Friends and Family: Twice a week     Frequency of Social Gatherings with Friends and Family: Once a week     Attends Religion Services: Never     Active Member of Clubs or Organizations: No     Attends Club or Organization Meetings: Never     Marital Status:    Intimate Partner Violence: Not At Risk (1/3/2020)    Humiliation, Afraid, Rape, and Kick questionnaire     Fear of Current or Ex-Partner: No     Emotionally Abused: No     Physically Abused: No     Sexually Abused: No   Housing Stability: Low Risk  (5/1/2023)    Housing Stability Vital Sign     Unable to Pay for Housing in the Last Year: No     Number of [address] in the Last Year: 2     Unstable Housing in the Last Year: No       Subjective         Objective    Physical Exam:   Assessment Type: Pre-treatment  General Appearance: Alert, Awake  Respiratory Pattern: Normal  Chest Assessment: Chest expansion symmetrical  Bilateral Breath Sounds: Diminished, Expiratory wheezes  O2 Device: nc    Vitals:  Blood pressure 135/68, pulse 104, temperature (!) 97.2 °F (36.2 °C), resp. rate 17, SpO2 94 %, not currently breastfeeding. Imaging and other studies: I have personally reviewed pertinent reports. O2 Device: nc     Plan    Respiratory Plan: Home Bronchodilator Patient pathway        Resp Comments: pt not in distress. lungs diminished with exp wheezes scattered. spo2 on 2L nc 94%. continue tx;s as ordered.

## 2023-10-29 NOTE — RESPIRATORY THERAPY NOTE
RT Protocol Note  Sully Moreira 69 y.o. female MRN: [id number]  Unit/Bed#: -01 Encounter: [id number]    Assessment    Principal Problem:    Diverticulitis  Active Problems:    Hypokalemia    Essential hypertension    Chronic respiratory failure with hypoxia (HCC)    Major depressive disorder, recurrent episode, moderate (HCC)    Anemia    Severe protein-calorie malnutrition (HCC)    BRBPR (bright red blood per rectum)    Physical Exam:   Assessment Type: Post-treatment  General Appearance: Alert, Awake  Respiratory Pattern: Normal  Chest Assessment: Chest expansion symmetrical  Bilateral Breath Sounds: Diminished  Cough: None  O2 Device: bc==nc    Vitals:  Blood pressure 151/69, pulse 87, temperature 97.9 °F (36.6 °C), temperature source Oral, resp. rate 16, SpO2 97 %, not currently breastfeeding. O2 Device: bc==nc     Plan    Respiratory Plan: Home Bronchodilator Patient pathway        Resp Comments: no changes will cont as ordered as per home regimen        10/29/23 0736   Respiratory Protocol   Protocol Initiated? No   Protocol Selection Respiratory   Language Barrier? No   Medical & Social History Reviewed? Yes   Diagnostic Studies Reviewed? Yes   Physical Assessment Performed? Yes   Respiratory Plan Home Bronchodilator Patient pathway   Respiratory Assessment   Assessment Type Post-treatment   General Appearance Alert; Awake   Respiratory Pattern Normal   Chest Assessment Chest expansion symmetrical   Bilateral Breath Sounds Diminished   Resp Comments no changes will cont as ordered as per home regimen   O2 Device bc==nc   Additional Assessments   Pulse 87   Respirations 16   SpO2 97 %

## 2023-10-29 NOTE — ED PROVIDER NOTES
History  Chief Complaint   Patient presents with    Rectal Bleeding     Pt came by EMS from home c/o bright red rectal bleeding x 4 days with BM.  found a hemorrhoid three months ago. Also c/o SOB x 2 days. Pt chronically on 4L NC. EMS gave 200mL NS en route. 69 female with history of COPD, CHF, hyperlipidemia, hypertension, MI presents ED for evaluation of bright red blood per rectum, shortness of breath. Patient states that she has had rectal bleeding for approximately 4 days. She noticed today that the bleeding increased in severity. She noticed clots in toilet today. This is not the first time that patient has had blood per rectum. . Review of documentation from hospitalization in May 2023 demonstrates that she was advised to have a outpatient sigmoid colonoscopy performed to further work-up her bright red blood per rectum. No record of this follow-up in chart. Patient has history of tobacco smoking. Patient reports during interview that she has not smoked cigarettes in years. Denies alcohol consumption, NSAID use. Patient does take aspirin daily. Denies history of colon cancer, diverticulitis, bowel obstruction, IBD, pancreatitis, hepatitis, peptic ulcer. In regards to patient's shortness of breath, she has chronic respiratory failure with hypoxia. She uses 4 L nasal cannula at baseline. Denies any sick contacts. Denies cough, congestion, chest pain, seizure, syncope, fever, chills, pain with urination, blood in urine. Prior to Admission Medications   Prescriptions Last Dose Informant Patient Reported? Taking?    Calcium-Magnesium-Vitamin D (CALCIUM 500 PO) 10/28/2023  Yes Yes   Sig: Take 1,000 mg by mouth daily   HYDROcodone-acetaminophen (NORCO) 5-325 mg per tablet 10/28/2023  Yes Yes   Sig: Take 1 tablet by mouth every 6 (six) hours as needed for pain   Zanubrutinib 80 MG CAPS 10/28/2023  Yes Yes   Sig: Take 160 mg by mouth 2 (two) times a day   albuterol (VENTOLIN HFA) 90 mcg/act inhaler   No Yes   Sig: Inhale 2 puffs every 6 (six) hours as needed for wheezing   aspirin (ECOTRIN LOW STRENGTH) 81 mg EC tablet   No No   Sig: Take 1 tablet (81 mg total) by mouth daily   benzonatate (TESSALON PERLES) 100 mg capsule   No No   Sig: Take 1 capsule (100 mg total) by mouth 3 (three) times a day as needed for cough   Patient not taking: Reported on 4/30/2023   buPROPion (WELLBUTRIN) 75 mg tablet 10/28/2023  Yes Yes   Sig: Take 75 mg by mouth in the morning   budesonide-formoterol (SYMBICORT) 160-4.5 mcg/act inhaler   No Yes   Sig: Inhale 2 puffs 2 (two) times a day Rinse mouth after use.    cariprazine (Vraylar) 1.5 MG capsule   Yes Yes   Sig: Take 1.5 mg by mouth daily   carvedilol (COREG) 25 mg tablet 10/28/2023  Yes Yes   Sig: Take 25 mg by mouth 2 (two) times a day with meals   enalapril (VASOTEC) 5 mg tablet   No No   Sig: Take 1 tablet (5 mg total) by mouth 2 (two) times a day   Patient taking differently: Take 20 mg by mouth 2 (two) times a day   ferrous sulfate 325 (65 Fe) mg tablet   Yes No   Sig: Take 325 mg by mouth daily with breakfast   furosemide (LASIX) 20 mg tablet   No No   Sig: Take 1 tablet (20 mg total) by mouth daily   guaiFENesin (MUCINEX) 600 mg 12 hr tablet   No No   Sig: Take 1 tablet (600 mg total) by mouth 2 (two) times a day   hydrOXYzine HCL (ATARAX) 25 mg tablet Not Taking  No No   Sig: Take 1 tablet (25 mg total) by mouth every 6 (six) hours as needed for anxiety   Patient not taking: Reported on 10/29/2023   hydrochlorothiazide (HYDRODIURIL) 25 mg tablet   No No   Sig: Take 1 tablet (25 mg total) by mouth daily   hydrocortisone (ANUSOL-HC) 2.5 % rectal cream   No No   Sig: Apply topically 2 (two) times a day   ipratropium (ATROVENT) 0.02 % nebulizer solution   No No   Sig: Take 2.5 mL (0.5 mg total) by nebulization 3 (three) times a day   melatonin 3 mg 10/28/2023  No Yes   Sig: Take 1 tablet (3 mg total) by mouth daily at bedtime as needed (insomnia) nitroglycerin (NITROSTAT) 0.4 mg SL tablet   Yes No   Sig: Place 0.4 mg under the tongue every 5 (five) minutes as needed for chest pain   sodium chloride 0.9 % nebulizer solution   No No   Sig: Take 3 mL by nebulization 3 (three) times a day   vilazodone (VIIBRYD) 40 mg tablet   Yes No   Sig: Take 40 mg by mouth daily with breakfast      Facility-Administered Medications: None       Past Medical History:   Diagnosis Date    Acute congestive heart failure ([address]) 2021    Anxiety     Cardiac disease     Chest pain 2021    Chronic pain disorder     COPD (chronic obstructive pulmonary disease) (HCC)     Depression     Heart disease     Hyperlipidemia     Hypertension     MI (myocardial infarction) ([address])     MRSA (methicillin resistant Staphylococcus aureus)     Renal disorder     benign kidney tumor       Past Surgical History:   Procedure Laterality Date    APPENDECTOMY      ELBOW BURSA SURGERY Left 2018    D/T MRSA INFECTION    SPINAL FUSION      L7 L9       Family History   Problem Relation Age of Onset    Heart disease Mother     Cancer Father      I have reviewed and agree with the history as documented. E-Cigarette/Vaping    E-Cigarette Use Never User      E-Cigarette/Vaping Substances    Nicotine No     THC No     CBD No     Flavoring No     Other No     Unknown No      Social History     Tobacco Use    Smoking status: Former     Packs/day: 1.00     Years: 60.00     Total pack years: 60.00     Types: Cigarettes     Quit date: 2016     Years since quittin.8    Smokeless tobacco: Never    Tobacco comments:     She has close to a 60 pack-year smoking history, most recently has cut down to 4-5 cigarettes daily   Vaping Use    Vaping Use: Never used   Substance Use Topics    Alcohol use: Never    Drug use: No       Review of Systems   Constitutional:  Negative for chills, diaphoresis, fatigue and fever. HENT:  Negative for congestion, ear pain and sore throat.     Eyes:  Negative for pain and visual disturbance. Respiratory:  Positive for shortness of breath. Negative for cough, wheezing and stridor. Cardiovascular:  Negative for chest pain and palpitations. Gastrointestinal:  Positive for anal bleeding and blood in stool. Negative for abdominal pain, constipation, diarrhea, nausea, rectal pain and vomiting. Genitourinary:  Negative for dysuria, flank pain, frequency, genital sores, hematuria, pelvic pain, urgency, vaginal bleeding and vaginal discharge. Musculoskeletal:  Negative for arthralgias and back pain. Skin:  Negative for color change and rash. Neurological:  Negative for dizziness, tremors, seizures, syncope, facial asymmetry, speech difficulty, weakness, light-headedness, numbness and headaches. All other systems reviewed and are negative. Physical Exam  Physical Exam  Vitals and nursing note reviewed. Exam conducted with a chaperone present. Constitutional:       General: She is not in acute distress. Appearance: She is well-developed. She is ill-appearing. She is not toxic-appearing or diaphoretic. HENT:      Head: Normocephalic and atraumatic. Eyes:      General:         Right eye: No discharge. Left eye: No discharge. Extraocular Movements: Extraocular movements intact. Conjunctiva/sclera: Conjunctivae normal.      Pupils: Pupils are equal, round, and reactive to light. Cardiovascular:      Rate and Rhythm: Regular rhythm. Tachycardia present. Pulses: Normal pulses. Heart sounds: No murmur heard. No friction rub. No gallop. Pulmonary:      Effort: Pulmonary effort is normal. No respiratory distress. Breath sounds: No stridor. Examination of the right-lower field reveals rales. Examination of the left-lower field reveals rales. Rales present. No wheezing or rhonchi. Abdominal:      General: There is no distension. Palpations: Abdomen is soft.  There is no shifting dullness, fluid wave, hepatomegaly, splenomegaly, mass or pulsatile mass. Tenderness: There is no abdominal tenderness. There is no right CVA tenderness, left CVA tenderness, guarding or rebound. Negative signs include Wen's sign, Rovsing's sign and McBurney's sign. Hernia: No hernia is present. Genitourinary:     Rectum: No anal fissure, external hemorrhoid or internal hemorrhoid. Musculoskeletal:         General: No swelling. Cervical back: Neck supple. Right lower leg: No edema. Left lower leg: No edema. Skin:     General: Skin is warm and dry. Capillary Refill: Capillary refill takes less than 2 seconds. Neurological:      General: No focal deficit present. Mental Status: She is alert and oriented to person, place, and time. Mental status is at baseline. Cranial Nerves: No cranial nerve deficit. Sensory: No sensory deficit. Motor: No weakness.       Coordination: Coordination normal.   Psychiatric:         Mood and Affect: Mood normal.         Vital Signs  ED Triage Vitals   Temperature Pulse Respirations Blood Pressure SpO2   10/28/23 2021 10/28/23 2017 10/28/23 2017 10/28/23 2017 10/28/23 2017   98.2 °F (36.8 °C) (!) 122 20 130/79 100 %      Temp Source Heart Rate Source Patient Position - Orthostatic VS BP Location FiO2 (%)   10/28/23 2021 10/28/23 2017 10/28/23 2017 10/28/23 2017 --   Oral Monitor Sitting Right arm       Pain Score       10/29/23 0125       No Pain           Vitals:    10/29/23 0100 10/29/23 0409 10/29/23 0432 10/29/23 0444   BP: 103/63 132/63 117/62 127/62   Pulse: (!) 110 (!) 108 96 97   Patient Position - Orthostatic VS: Sitting Lying           Visual Acuity      ED Medications  Medications   nicotine (NICODERM CQ) 7 mg/24hr TD 24 hr patch 1 patch (has no administration in time range)   albuterol (PROVENTIL HFA,VENTOLIN HFA) inhaler 2 puff (has no administration in time range)   budesonide-formoterol (SYMBICORT) 160-4.5 mcg/act inhaler 2 puff (has no administration in time range)   buPROPion [facility]) tablet 75 mg (has no administration in time range)   carvedilol (COREG) tablet 25 mg (has no administration in time range)   vilazodone (VIIBRYD) tablet 40 mg (has no administration in time range)   Zanubrutinib CAPS 160 mg (has no administration in time range)   piperacillin-tazobactam (ZOSYN) 2.25 g in sodium chloride 0.9 % 50 mL IVPB (2.25 g Intravenous New Bag 10/29/23 0156)   ipratropium (ATROVENT) 0.02 % inhalation solution 0.5 mg (has no administration in time range)   iohexol (OMNIPAQUE) 350 MG/ML injection (MULTI-DOSE) 100 mL (100 mL Intravenous Given 10/28/23 2200)       Diagnostic Studies  Results Reviewed       Procedure Component Value Units Date/Time    Basic metabolic panel [[id number]]     Lab Status: No result Specimen: Blood     CBC and differential [[id number]]     Lab Status: No result Specimen: Blood     HS Troponin I 4hr [[id number]]  (Normal) Collected: 10/29/23 0121    Lab Status: Final result Specimen: Blood from Arm, Left Updated: 10/29/23 0151     hs TnI 4hr 12 ng/L      Delta 4hr hsTnI 6 ng/L     HS Troponin I 2hr [[id number]]  (Normal) Collected: 10/28/23 2322    Lab Status: Final result Specimen: Blood from Arm, Left Updated: 10/29/23 0023     hs TnI 2hr 8 ng/L      Delta 2hr hsTnI 2 ng/L     FLU/RSV/COVID - if FLU/RSV clinically relevant [[id number]]  (Normal) Collected: 10/28/23 2121    Lab Status: Final result Specimen: Nares from Nose Updated: 10/28/23 2211     SARS-CoV-2 Negative     INFLUENZA A PCR Negative     INFLUENZA B PCR Negative     RSV PCR Negative    Narrative:      FOR PEDIATRIC PATIENTS - copy/paste COVID Guidelines URL to browser: [email or web address]    SARS-CoV-2 assay is a Nucleic Acid Amplification assay intended for the  qualitative detection of nucleic acid from SARS-CoV-2 in nasopharyngeal  swabs. Results are for the presumptive identification of SARS-CoV-2 RNA.     Positive results are indicative of infection with SARS-CoV-2, the virus  causing COVID-19, but do not rule out bacterial infection or co-infection  with other viruses. Laboratories within the [facility] and its  territories are required to report all positive results to the appropriate  public health authorities. Negative results do not preclude SARS-CoV-2  infection and should not be used as the sole basis for treatment or other  patient management decisions. Negative results must be combined with  clinical observations, patient history, and epidemiological information. This test has not been FDA cleared or approved. This test has been authorized by FDA under an Emergency Use Authorization  (EUA). This test is only authorized for the duration of time the  declaration that circumstances exist justifying the authorization of the  emergency use of an in vitro diagnostic tests for detection of SARS-CoV-2  virus and/or diagnosis of COVID-19 infection under section 564(b)(1) of  the Act, 21 U. S.C. 492VRN-8(R)(5), unless the authorization is terminated  or revoked sooner. The test has been validated but independent review by FDA  and CLIA is pending. Test performed using [employer] GeneXpert: This RT-PCR assay targets N2,  a region unique to SARS-CoV-2. A conserved region in the E-gene was chosen  for pan-Sarbecovirus detection which includes SARS-CoV-2. According to CMS-2020-01-R, this platform meets the definition of high-throughput technology.     HS Troponin 0hr (reflex protocol) [[id number]]  (Normal) Collected: 10/28/23 2121    Lab Status: Final result Specimen: Blood from Arm, Left Updated: 10/28/23 2154     hs TnI 0hr 6 ng/L     B-Type Natriuretic Peptide(BNP) [[id number]]  (Normal) Collected: 10/28/23 2121    Lab Status: Final result Specimen: Blood from Arm, Left Updated: 10/28/23 2153     BNP 22 pg/mL     Comprehensive metabolic panel [[id number]]  (Abnormal) Collected: 10/28/23 2121    Lab Status: Final result Specimen: Blood from Arm, Left Updated: 10/28/23 2148     Sodium 142 mmol/L      Potassium 3.7 mmol/L      Chloride 101 mmol/L      CO2 38 mmol/L      ANION GAP 3 mmol/L      BUN 33 mg/dL      Creatinine 1.07 mg/dL      Glucose 113 mg/dL      Calcium 8.9 mg/dL      AST 9 U/L      ALT 3 U/L      Alkaline Phosphatase 59 U/L      Total Protein 5.8 g/dL      Albumin 3.6 g/dL      Total Bilirubin 0.20 mg/dL      eGFR 50 ml/min/1.73sq m     Narrative:      National Kidney Disease Foundation guidelines for Chronic Kidney Disease (CKD):     Stage 1 with normal or high GFR (GFR > 90 mL/min/1.73 square meters)    Stage 2 Mild CKD (GFR = 60-89 mL/min/1.73 square meters)    Stage 3A Moderate CKD (GFR = 45-59 mL/min/1.73 square meters)    Stage 3B Moderate CKD (GFR = 30-44 mL/min/1.73 square meters)    Stage 4 Severe CKD (GFR = 15-29 mL/min/1.73 square meters)    Stage 5 End Stage CKD (GFR <15 mL/min/1.73 square meters)  Note: GFR calculation is accurate only with a steady state creatinine    Lipase [[id number]]  (Normal) Collected: 10/28/23 2121    Lab Status: Final result Specimen: Blood from Arm, Left Updated: 10/28/23 2148     Lipase 15 u/L     Protime-INR [[id number]]  (Normal) Collected: 10/28/23 2121    Lab Status: Final result Specimen: Blood from Arm, Left Updated: 10/28/23 2139     Protime 13.9 seconds      INR 1.01    APTT [[id number]]  (Normal) Collected: 10/28/23 2121    Lab Status: Final result Specimen: Blood from Arm, Left Updated: 10/28/23 2139     PTT 28 seconds     CBC and differential [[id number]]  (Abnormal) Collected: 10/28/23 2121    Lab Status: Final result Specimen: Blood from Arm, Left Updated: 10/28/23 2128     WBC 13.84 Thousand/uL      RBC 2.25 Million/uL      Hemoglobin 7.0 g/dL      Hematocrit 21.8 %      MCV 97 fL      MCH 31.1 pg      MCHC 32.1 g/dL      RDW 13.3 %      MPV 10.0 fL      Platelets 584 Thousands/uL      nRBC 0 /100 WBCs      Neutrophils Relative 34 %      Immat GRANS % 0 %      Lymphocytes Relative 55 %      Monocytes Relative 6 %      Eosinophils Relative 4 %      Basophils Relative 1 %      Neutrophils Absolute 4.66 Thousands/µL      Immature Grans Absolute 0.06 Thousand/uL      Lymphocytes Absolute 7.65 Thousands/µL      Monocytes Absolute 0.82 Thousand/µL      Eosinophils Absolute 0.58 Thousand/µL      Basophils Absolute 0.07 Thousands/µL                    CT abdomen pelvis with contrast   Final Result by Jorge Rodriguez MD (10/28 4560)      Colonic diverticulosis. There is mild thickening of a short segment of the sigmoid colon with minimal adjacent fat stranding suspicious for acute diverticulitis. The study was marked in [facility] for immediate notification. Workstation performed: [id number]         XR chest 2 views   ED Interpretation by Nelida Skelton PA-C (10/29 7902)   No pneumonia, pneumothorax, tension pneumothorax, pneumomediastinum seen. Procedures  ECG 12 Lead Documentation Only    Date/Time: 10/28/2023 9:30 PM    Performed by: Nelida Skelton PA-C  Authorized by: Nelida Skelton PA-C    Indications / Diagnosis:  SOB  Patient location:  ED  Previous ECG:     Previous ECG:  Compared to current    Similarity:  No change  Interpretation:     Interpretation: normal    Rate:     ECG rate:  120    ECG rate assessment: tachycardic    Rhythm:     Rhythm: sinus tachycardia    Ectopy:     Ectopy: none    QRS:     QRS axis:  Normal    QRS intervals:  Normal  Conduction:     Conduction: normal    ST segments:     ST segments:  Normal  T waves:     T waves: normal             ED Course                               SBIRT 22yo+      Flowsheet Row Most Recent Value   Initial Alcohol Screen: US AUDIT-C     1. How often do you have a drink containing alcohol? 0 Filed at: 10/28/2023 2019   2. How many drinks containing alcohol do you have on a typical day you are drinking? 0 Filed at: 10/28/2023 2019   3b. FEMALE Any Age, or MALE 65+:  How often do you have 4 or more drinks on one occassion? 0 Filed at: 10/28/2023 2019   Audit-C Score 0 Filed at: 10/28/2023 2019   NIA: How many times in the past year have you. .. Used an illegal drug or used a prescription medication for non-medical reasons? Never Filed at: 10/28/2023 2019                      Medical Decision Making  78-year-old female with history of COPD, CHF, hyperlipidemia, hypertension, MI presents ED for evaluation of bright red blood per rectum. The past 4 days she has noticed blood in her stool. Today the stool contained noticeably more blood. She previously has had bloody stool and was advised to have a sigmoid colonoscopy performed. This follow-up has not occurred. Patient take aspirin daily. On presentation she is slightly tachycardic at 122 bpm.  Normotensive, afebrile, without respiratory distress. She is on 4 L nasal cannula at baseline. Do hear some rales in the bilateral lower lobes. No abnormal heart sounds heard. No abdominal tenderness. Abdomen soft. Visual inspection of patient's rectal area demonstrates no hemorrhoids, fissures. Chaperone present during examination. Given patient's symptoms, will obtain work-up consisting of CBC, CMP, BNP, serial troponin, lipase, APTT, pro time INR, flu/RSV/COVID, CT abdomen pelvis with IV contrast, and serial EKGs. Providing DuoNeb for comfort. Differential diagnosis includes but is not limited to lower GI bleed, upper GI bleed, diverticulitis, MI, ACS, COPD exacerbation, hemorrhoid, pneumonia, pneumothorax, tension pneumothorax, chronic respiratory failure, cancer. CBC returned with a mildly elevated WBC of 13.84. Hemoglobin of 7.0. Retacrit 21.8% WNL. aPTT WNL. UA returned without lipase returned WNL. BMP returned WNL. Initial troponin of 6 NG/L.  8 NG/L. Flu/RSV/COVID returned negative. CT abdomen pelvis returned demonstrating: "Colonic diverticulosis.  There is mild thickening of a short segment of the sigmoid colon with minimal adjacent fat stranding suspicious for acute diverticulitis"    Reached out to [facility] attending Dr. Marcus Long. Patient to be admitted to [facility] service. Will type and cross, administer 1 unit of blood given patient's hbg of 7.0, tachycardia and evidence of GI bleed. While approaching patient's room to obtain consent for blood transfusion, she was on commode. She had large bloody bowel movement. This is the first time the patient has had a bloody bowel movement in the ED. This bloody bowel movement further necessitates administration of blood transfusion. Consent obtained for blood transfusion. Patient in agreement with plan for admission to [facility] service. Amount and/or Complexity of Data Reviewed  Labs: ordered. Radiology: ordered. Risk  Prescription drug management. Decision regarding hospitalization. Disposition  Final diagnoses:   Diverticulitis   Bright red blood per rectum     Time reflects when diagnosis was documented in both MDM as applicable and the Disposition within this note       Time User Action Codes Description Comment    10/29/2023 12:28 AM Santino Valdovinos Add [K57.92] Diverticulitis     10/29/2023 12:28 AM Santino Valdovinos Add [K62.5] Bright red blood per rectum     10/29/2023 12:36 AM Jeri Anthony Covert Add [K62.5] BRBPR (bright red blood per rectum)           ED Disposition       ED Disposition   Admit    Condition   Stable    Date/Time   Sun Oct 29, 2023 0028    Comment   Case was discussed with Dr. Marcus Long and the patient's admission status was agreed to be Admission Status: inpatient status to the service of Dr. Marcus Long.                 Follow-up Information    None         Current Discharge Medication List        CONTINUE these medications which have NOT CHANGED    Details   albuterol (VENTOLIN HFA) 90 mcg/act inhaler Inhale 2 puffs every 6 (six) hours as needed for wheezing  Qty: 1 Inhaler, Refills: 0    Comments: Substitution to a formulary equivalent within the same pharmaceutical class is authorized. Associated Diagnoses: COPD exacerbation (HCC)      budesonide-formoterol (SYMBICORT) 160-4.5 mcg/act inhaler Inhale 2 puffs 2 (two) times a day Rinse mouth after use.   Qty: 1 Inhaler, Refills: 0    Associated Diagnoses: COPD exacerbation ([address])      buPROPion (WELLBUTRIN) 75 mg tablet Take 75 mg by mouth in the morning      Calcium-Magnesium-Vitamin D (CALCIUM 500 PO) Take 1,000 mg by mouth daily      cariprazine (Vraylar) 1.5 MG capsule Take 1.5 mg by mouth daily      carvedilol (COREG) 25 mg tablet Take 25 mg by mouth 2 (two) times a day with meals      HYDROcodone-acetaminophen (NORCO) 5-325 mg per tablet Take 1 tablet by mouth every 6 (six) hours as needed for pain      melatonin 3 mg Take 1 tablet (3 mg total) by mouth daily at bedtime as needed (insomnia)  Qty: 30 tablet, Refills: 0    Associated Diagnoses: Major depressive disorder, recurrent episode, moderate (HCC)      Zanubrutinib 80 MG CAPS Take 160 mg by mouth 2 (two) times a day      aspirin (ECOTRIN LOW STRENGTH) 81 mg EC tablet Take 1 tablet (81 mg total) by mouth daily  Qty: 30 tablet, Refills: 0    Associated Diagnoses: COPD (chronic obstructive pulmonary disease) (HCC)      benzonatate (TESSALON PERLES) 100 mg capsule Take 1 capsule (100 mg total) by mouth 3 (three) times a day as needed for cough  Qty: 20 capsule, Refills: 0    Associated Diagnoses: COPD exacerbation (HCC)      enalapril (VASOTEC) 5 mg tablet Take 1 tablet (5 mg total) by mouth 2 (two) times a day  Qty: 60 tablet, Refills: 1    Associated Diagnoses: Essential hypertension      ferrous sulfate 325 (65 Fe) mg tablet Take 325 mg by mouth daily with breakfast      furosemide (LASIX) 20 mg tablet Take 1 tablet (20 mg total) by mouth daily  Qty: 30 tablet, Refills: 0    Associated Diagnoses: COPD (chronic obstructive pulmonary disease) (HCC)      guaiFENesin (MUCINEX) 600 mg 12 hr tablet Take 1 tablet (600 mg total) by mouth 2 (two) times a day  Qty: 60 tablet, Refills: 0    Associated Diagnoses: COPD exacerbation (HCC)      hydrochlorothiazide (HYDRODIURIL) 25 mg tablet Take 1 tablet (25 mg total) by mouth daily  Qty: 30 tablet, Refills: 0    Associated Diagnoses: Essential hypertension      hydrocortisone (ANUSOL-HC) 2.5 % rectal cream Apply topically 2 (two) times a day  Qty: 28 g, Refills: 0    Associated Diagnoses: COPD with acute exacerbation (HCC)      hydrOXYzine HCL (ATARAX) 25 mg tablet Take 1 tablet (25 mg total) by mouth every 6 (six) hours as needed for anxiety  Refills: 0    Associated Diagnoses: COPD with acute exacerbation (HCC)      ipratropium (ATROVENT) 0.02 % nebulizer solution Take 2.5 mL (0.5 mg total) by nebulization 3 (three) times a day  Qty: 225 mL, Refills: 0    Associated Diagnoses: COPD with acute exacerbation (HCC)      nitroglycerin (NITROSTAT) 0.4 mg SL tablet Place 0.4 mg under the tongue every 5 (five) minutes as needed for chest pain      sodium chloride 0.9 % nebulizer solution Take 3 mL by nebulization 3 (three) times a day  Refills: 0    Associated Diagnoses: COPD exacerbation (HCC)      vilazodone (VIIBRYD) 40 mg tablet Take 40 mg by mouth daily with breakfast             No discharge procedures on file.     PDMP Review         Value Time User    PDMP Reviewed  Yes 8/27/2021  3:13 AM Oscar Mcallister PA-C            ED Provider  Electronically Signed by             Raghavendra Friedman PA-C  10/29/23 1569

## 2023-10-29 NOTE — ASSESSMENT & PLAN NOTE
Acute on chronic anemia  Now symptomatic anemia with tachycardia with ongoing bright red blood per rectum  See above for management of BRBPR  Continue PRBC transfusion after which continue ferrous sulfate  Old aspirin

## 2023-10-29 NOTE — QUICK NOTE
Patient states that she feels a little better. Abdominal pain and bleeding both seems slightly improved. No nausea or vomiting. Does have generalized weakness. Hemoglobin improved to 8.3. We will continue to hold aspirin.   Await gastroenterology input on further plan including possible colonoscopy

## 2023-10-29 NOTE — ASSESSMENT & PLAN NOTE
Patient presented with bright red blood per rectum for about 4 days, denies any nausea vomiting abdominal pain but does have leukocytosis. CT abdomen showed diverticulitis  We will start on IV antibiotics.   GI consultation  CLD

## 2023-10-29 NOTE — CONSULTS
Consultation - [facility]  Priti Ocampo 69 y.o. female MRN: [id number]  Unit/Bed#: -01 Encounter: [id number]         Reason for Consult / Principal Problem: Hematochezia, anemia    HPI: Marilyn Sumner is a 15-year-old female with history of COPD on supplemental oxygen between 3 to 4 L at baseline, CAD, tobacco dependence and hypertension. GI is being consulted for hematochezia. On this admission, she presented with a hemoglobin of 7. CT revealing colonic diverticulosis with mild thickening of the sigmoid with minimal adjacent fat stranding suspicious for acute diverticulitis. She received 1 unit of packed red blood cells, and hemoglobin this morning 8.3. BUN 33. Patient reports that she has chronic rectal bleeding, but began having brisk bleeding over the weekend. Patient was hospitalized for rectal bleeding in the past, but unfortunately had aspirated during last colonoscopy prep in May, and required up to 15 L of oxygen. Per patient, she reports having a colonoscopy 5 years ago with history of precancerous colon polyps. Unfortunately, those records are unavailable. She denies family history of colon cancer. The only record of a colonoscopy available is in care everywhere, there is mention of a colonoscopy in 2017 noting diverticulosis and large internal hemorrhoids. Review of Systems:    CONSTITUTIONAL: Denies any fever, chills, or rigors. Good appetite, and no recent weight loss. HEENT: No earache or tinnitus. Denies hearing loss or visual disturbances. CARDIOVASCULAR: No chest pain or palpitations. RESPIRATORY: Denies any cough, hemoptysis, shortness of breath or dyspnea on exertion. GASTROINTESTINAL: As noted in the History of Present Illness. GENITOURINARY: No problems with urination. Denies any hematuria or dysuria. NEUROLOGIC: No dizziness or vertigo, denies headaches. MUSCULOSKELETAL: Denies any muscle or joint pain. SKIN: Denies skin rashes or itching. ENDOCRINE: Denies excessive thirst. Denies intolerance to heat or cold. PSYCHOSOCIAL: Denies depression or anxiety. Denies any recent memory loss.        Historical Information   Past Medical History:   Diagnosis Date    Acute congestive heart failure ([address]) 2021    Anxiety     Cardiac disease     Chest pain 2021    Chronic pain disorder     COPD (chronic obstructive pulmonary disease) (HCC)     Depression     Heart disease     Hyperlipidemia     Hypertension     MI (myocardial infarction) ([address])     MRSA (methicillin resistant Staphylococcus aureus)     Renal disorder     benign kidney tumor     Past Surgical History:   Procedure Laterality Date    APPENDECTOMY      ELBOW BURSA SURGERY Left 2018    D/T MRSA INFECTION    SPINAL FUSION      L7 L9     Social History   Social History     Substance and Sexual Activity   Alcohol Use Never     Social History     Substance and Sexual Activity   Drug Use No     Social History     Tobacco Use   Smoking Status Former    Packs/day: 1.00    Years: 60.00    Total pack years: 60.00    Types: Cigarettes    Quit date:     Years since quittin.8   Smokeless Tobacco Never   Tobacco Comments    She has close to a 60 pack-year smoking history, most recently has cut down to 4-5 cigarettes daily     Family History   Problem Relation Age of Onset    Heart disease Mother     Cancer Father         Meds/Allergies     Current Facility-Administered Medications   Medication Dose Route Frequency    albuterol (PROVENTIL HFA,VENTOLIN HFA) inhaler 2 puff  2 puff Inhalation Q6H PRN    budesonide-formoterol (SYMBICORT) 160-4.5 mcg/act inhaler 2 puff  2 puff Inhalation BID    buPROPion (WELLBUTRIN) tablet 75 mg  75 mg Oral Daily    carvedilol (COREG) tablet 25 mg  25 mg Oral BID With Meals    ipratropium (ATROVENT) 0.02 % inhalation solution 0.5 mg  0.5 mg Nebulization TID    nicotine (NICODERM CQ) 7 mg/24hr TD 24 hr patch 1 patch  1 patch Transdermal Daily piperacillin-tazobactam (ZOSYN) 2.25 g in sodium chloride 0.9 % 50 mL IVPB  2.25 g Intravenous Q6H    vilazodone (VIIBRYD) tablet 40 mg  40 mg Oral Daily With Breakfast    Zanubrutinib CAPS 160 mg  160 mg Oral BID       Allergies   Allergen Reactions    Sulfa Antibiotics Anaphylaxis    Niacin     Statins Other (See Comments)     cramps         Objective     Blood pressure 151/69, pulse 87, temperature 97.9 °F (36.6 °C), temperature source Oral, resp. rate 16, SpO2 97 %, not currently breastfeeding. Intake/Output Summary (Last 24 hours) at 10/29/2023 0910  Last data filed at 10/29/2023 4022  Gross per 24 hour   Intake 540 ml   Output --   Net 540 ml         PHYSICAL EXAM:      General Appearance:   Alert and oriented x 3. Cooperative, and in no respiratory distress   HEENT:   Normocephalic, atraumatic, anicteric. Neck:  Supple, symmetrical, trachea midline   Lungs:   Clear to auscultation bilaterally; no rales, rhonchi or wheezing; respirations unlabored    Heart[de-identified]   S1 and S2 normal; regular rate and rhythm; no murmur, rub, or gallop.    Abdomen:   Soft, non-tender, non-distended; normal bowel sounds; no masses, no organomegaly    Genitalia:   Deferred    Rectal:   Deferred    Extremities:  No cyanosis, clubbing or edema    Pulses:  2+ and symmetric all extremities    Skin:  Skin color, texture, turgor normal, no rashes or lesions    Lymph nodes:  No palpable cervical or supraclavicular lymphadenopathy        Lab Results:   Results from last 7 days   Lab Units 10/29/23  0719   WBC Thousand/uL 11.07*   HEMOGLOBIN g/dL 8.3*   HEMATOCRIT % 24.6*   PLATELETS Thousands/uL 176   NEUTROS PCT % 37*   LYMPHS PCT % 51*   MONOS PCT % 6   EOS PCT % 4     Results from last 7 days   Lab Units 10/29/23  0719 10/28/23  2121   POTASSIUM mmol/L 3.5 3.7   CHLORIDE mmol/L 100 101   CO2 mmol/L 34* 38*   BUN mg/dL 28* 33*   CREATININE mg/dL 1.00 1.07   CALCIUM mg/dL 8.8 8.9   ALK PHOS U/L  --  59   ALT U/L  --  3*   AST U/L  -- 9*     Results from last 7 days   Lab Units 10/28/23  2121   INR  1.01     Results from last 7 days   Lab Units 10/28/23  2121   LIPASE u/L 15       Imaging Studies:  CT abdomen pelvis with contrast    Result Date: 10/28/2023  Impression: Colonic diverticulosis. There is mild thickening of a short segment of the sigmoid colon with minimal adjacent fat stranding suspicious for acute diverticulitis. The study was marked in [facility] for immediate notification. Workstation performed: [id number]       ASSESSMENT and PLAN:      1) Hematochezia, acute on chronic anemia and history of diverticulosis - May be secondary to diverticular bleeding, malignancy, AVM, less likely ischemic colitis. Consideration of rapid upper GI cause, although is hemodynamically stable now. BUN/Cr ratio less than 36. Unfortunately during her last bowel prep, the patient aspirated and required up to 15 L of oxygen, currently she is satting at her baseline.   - Continue to monitor hemoglobin closely and transfuse as necessary with a goal hemoglobin above 7-8  - Discussed with Dr. Deyanira Senior, plan for EGD and colonoscopy vs flex sig to investigate tomorrow  - Strict aspiration precautions   - Please notify GI if patient develops signs of hemodynamic instability warranting sooner endoscopic evaluation    - Could also consider CTA if large volume bleeding occurs     2) Sigmoid thickening, suspicion for diverticulitis?- Her above symptoms do not seem consistent with diverticulitis given the amount of hematochezia that she is having. No fever or elevated white count from baseline. She is currently on IV Zosyn. ADDENDUM: Spoke with her son. He reports she moved in with him recently because she lived an hour away and was not taking medications correctly. Had light rectal bleeding last week which is her baseline due to self reported history of IBS per son, but Saturday became voluminous. We went over differential diagnoses, benefits and risks of procedure. Patient's son agrees with plan for EGD flexible sigmoidoscopy vs colonoscopy depending how well she can prep. Updated Dr Darryl Cruz. The patient was seen and examined by Dr. Ever Edgar, all portillo medical decisions were made with Dr. Ever Edgar. Thank you for allowing us to participate in the care of this pleasant patient. We will follow up with you closely. Portions of the record may have been created with voice recognition software. Occasional wrong word or "sound a like" substitutions may have occurred due to the inherent limitations of voice recognition software. Read the chart carefully and recognize, using context, where substitutions have occurred.

## 2023-10-29 NOTE — RESPIRATORY THERAPY NOTE
RT Protocol Note  Sammy Talamantes 69 y.o. female MRN: [id number]  Unit/Bed#: -01 Encounter: [id number]    Assessment    Principal Problem:    Diverticulitis  Active Problems:    Hypokalemia    Essential hypertension    Chronic respiratory failure with hypoxia (HCC)    Major depressive disorder, recurrent episode, moderate (HCC)    Anemia    Severe protein-calorie malnutrition (HCC)    BRBPR (bright red blood per rectum)      Home Pulmonary Medications:  Atrovent neb 2-3 times a day, Albuterol MDI prn       Past Medical History:   Diagnosis Date    Acute congestive heart failure ([address]) 2021    Anxiety     Cardiac disease     Chest pain 2021    Chronic pain disorder     COPD (chronic obstructive pulmonary disease) (HCC)     Depression     Heart disease     Hyperlipidemia     Hypertension     MI (myocardial infarction) ([address])     MRSA (methicillin resistant Staphylococcus aureus)     Renal disorder     benign kidney tumor     Social History     Socioeconomic History    Marital status:      Spouse name: None    Number of children: 3    Years of education: 13    Highest education level: High school graduate   Occupational History    Occupation: [address] [place] [address]      Employer: [employer]     Comment: retired    Tobacco Use    Smoking status: Former     Packs/day: 1.00     Years: 60.00     Total pack years: 60.00     Types: Cigarettes     Quit date: 2016     Years since quittin.8    Smokeless tobacco: Never    Tobacco comments:     She has close to a 60 pack-year smoking history, most recently has cut down to 4-5 cigarettes daily   Vaping Use    Vaping Use: Never used   Substance and Sexual Activity    Alcohol use: Never    Drug use: No    Sexual activity: Not Currently   Other Topics Concern    None   Social History Narrative    None     Social Determinants of Health     Financial Resource Strain: Medium Risk (1/3/2020)    Overall Financial Resource Strain (CARDIA)     Difficulty of Paying Living Expenses: Somewhat hard   Food Insecurity: No Food Insecurity (5/1/2023)    Hunger Vital Sign     Worried About Running Out of Food in the Last Year: Never true     Ran Out of Food in the Last Year: Never true   Transportation Needs: No Transportation Needs (5/1/2023)    PRAPARE - Transportation     Lack of Transportation (Medical): No     Lack of Transportation (Non-Medical): No   Physical Activity: Inactive (1/3/2020)    Exercise Vital Sign     Days of Exercise per Week: 0 days     Minutes of Exercise per Session: 0 min   Stress: No Stress Concern Present (1/3/2020)    [facility]     Feeling of Stress : Only a little   Social Connections: Socially Isolated (1/3/2020)    Social Connection and Isolation Panel [NHANES]     Frequency of Communication with Friends and Family: Twice a week     Frequency of Social Gatherings with Friends and Family: Once a week     Attends [employer] Services: Never     Active Member of Clubs or Organizations: No     Attends Club or Organization Meetings: Never     Marital Status:    Intimate Partner Violence: Not At Risk (1/3/2020)    Humiliation, Afraid, Rape, and Kick questionnaire     Fear of Current or Ex-Partner: No     Emotionally Abused: No     Physically Abused: No     Sexually Abused: No   Housing Stability: Low Risk  (5/1/2023)    Housing Stability Vital Sign     Unable to Pay for Housing in the Last Year: No     Number of [address] in the Last Year: 2     Unstable Housing in the Last Year: No       Subjective         Objective    Physical Exam:   General Appearance: Alert, Awake  Respiratory Pattern: Normal  Chest Assessment: Chest expansion symmetrical  Bilateral Breath Sounds: Diminished  Cough: None  O2 Device: nasal cannula    Vitals:  Blood pressure 103/63, pulse (!) 110, temperature 98.2 °F (36.8 °C), temperature source Oral, resp.  rate 21, SpO2 100 %, not currently breastfeeding.           Imaging and other studies:     O2 Device: nasal cannula     Plan    Respiratory Plan: Home Bronchodilator Patient pathway        Resp Comments: respirations are even and non labored, hx of COPD with home medication use, pt not in exacerbation, will continue with scheduled nebulizers per home regimen

## 2023-10-29 NOTE — H&P
[address]  H&P  Name: Tess Reese 69 y.o. female I MRN: [id number]  Unit/Bed#: ED 24 I Date of Admission: 10/28/2023   Date of Service: 10/29/2023 I Hospital Day: 0      Assessment/Plan   * Diverticulitis  Assessment & Plan  Patient presented with bright red blood per rectum for about 4 days, denies any nausea vomiting abdominal pain but does have leukocytosis. CT abdomen showed diverticulitis  We will start on IV antibiotics. GI consultation  CLD    BRBPR (bright red blood per rectum)  Assessment & Plan  Presented with bright red blood per rectum, does have history of hemorrhoids. Patient was admitted earlier this year for similar complaints and was supposed to get colonoscopy in house which she did not get as she had aspiration episode while doing colonoscopy prep she was supposed to get outpatient flex sig ureteroscopy which per patient did not get it done. Rectal bleed likely related to diverticulitis and hemorrhoids. However need to rule out other cause of bleeding she is actively having rectal bleeding in the ER. Hemoglobin has dropped to 7.0 and she is tachycardic. Discussed with the ER physician and started PRBC transfusion. Hold aspirin  GI consultation-May not be able to do colonoscopy at this time given acute diverticulitis, definitely needs a colonoscopy. ? Bleeding scan    Severe protein-calorie malnutrition (HCC)  Assessment & Plan  Malnutrition Findings:       BMI 13.92  Dietary consultation     Anemia  Assessment & Plan  Acute on chronic anemia  Now symptomatic anemia with tachycardia with ongoing bright red blood per rectum  See above for management of BRBPR  Continue PRBC transfusion after which continue ferrous sulfate  Old aspirin    Major depressive disorder, recurrent episode, moderate (HCC)  Assessment & Plan  Continue bupropion    Chronic respiratory failure with hypoxia (HCC)  Assessment & Plan  Use 4 L oxygen via nasal cannula at home secondary to COPD. Continue same here    Essential hypertension  Assessment & Plan  BP low normal.  Will hold Lasix and hydrochlorothiazide for now monitor BP closely    Hypokalemia  Assessment & Plan  Takes potassium supplement along with Lasix. Currently we will hold Lasix given ongoing GI bleed       VTE Pharmacologic Prophylaxis:    SCDs, pharmacological VTE secondary to active bleeding  Code Status: Level 1 - Full Code   Discussion with family:   . Anticipated Length of Stay: Patient will be admitted on an inpatient basis with an anticipated length of stay of greater than 2 midnights secondary to diverticulitis and BR IL. Total Time Spent on Date of Encounter in care of patient: 60 mins. This time was spent on one or more of the following: performing physical exam; counseling and coordination of care; obtaining or reviewing history; documenting in the medical record; reviewing/ordering tests, medications or procedures; communicating with other healthcare professionals and discussing with patient's family/caregivers. Chief Complaint: Rectal bleeding    History of Present Illness:  Edin Ibrahim is a 69 y.o. female with a PMH of multiple myeloma on treatment, hypertension, chronic hypoxia on home oxygen secondary to COPD, tobacco abuse, protein energy malnutrition, hypertension, CAD who presents with rectal bleeding. Patient does have a history of IBS for a long time and has intermittent rectal bleeding per also has hemorrhoids. This time bleeding has been going on for 4 days. Bright red blood per rectum. Denies any nausea or abdominal pain or vomiting. Denies any fever, chills. No chest pain, palpitation. Shortness of breath is her baseline with COPD but she tells me she did get anxious when she saw continuous bleeding for the last several days. No focal deficit, no headache. No recent travel. Continues to take aspirin. Has not had colonoscopy done recently.     In ER she was found to be tachycardic, blood pressure low normal, hemoglobin dropped to 7.0 and in ER had an episode of bowel movement which was bright red blood per rectum. Discussed with the ER and started PRBC transfusion. Review of Systems:  Review of Systems 10 point review of system negative except for that mentioned in HPI    Past Medical and Surgical History:   Past Medical History:   Diagnosis Date    Acute congestive heart failure ([address]) 8/27/2021    Anxiety     Cardiac disease     Chest pain 8/27/2021    Chronic pain disorder     COPD (chronic obstructive pulmonary disease) (HCC)     Depression     Heart disease     Hyperlipidemia     Hypertension     MI (myocardial infarction) ([address])     MRSA (methicillin resistant Staphylococcus aureus)     Renal disorder     benign kidney tumor       Past Surgical History:   Procedure Laterality Date    APPENDECTOMY      ELBOW BURSA SURGERY Left 02/2018    D/T MRSA INFECTION    SPINAL FUSION      L7 L9       Meds/Allergies:  Prior to Admission medications    Medication Sig Start Date End Date Taking? Authorizing Provider   albuterol (VENTOLIN HFA) 90 mcg/act inhaler Inhale 2 puffs every 6 (six) hours as needed for wheezing 1/7/20   Obey Garcia PA-C   aspirin (ECOTRIN LOW STRENGTH) 81 mg EC tablet Take 1 tablet (81 mg total) by mouth daily 8/31/21 9/30/21  Kate Jaeger MD   benzonatate (TESSALON PERLES) 100 mg capsule Take 1 capsule (100 mg total) by mouth 3 (three) times a day as needed for cough  Patient not taking: Reported on 4/30/2023 3/7/23   Bev Wang PA-C   budesonide-formoterol [facility]) 160-4.5 mcg/act inhaler Inhale 2 puffs 2 (two) times a day Rinse mouth after use.  1/7/20   Obey Garcia PA-C   buPROPion [facility] 75 mg tablet Take 75 mg by mouth in the morning    Historical Provider, MD   Calcium-Magnesium-Vitamin D (CALCIUM 500 PO) Take 1,000 mg by mouth daily    Historical Provider, MD   carvedilol (COREG) 25 mg tablet Take 25 mg by mouth 2 (two) times a day with meals    Historical Provider, MD   enalapril (VASOTEC) 5 mg tablet Take 1 tablet (5 mg total) by mouth 2 (two) times a day  Patient taking differently: Take 20 mg by mouth 2 (two) times a day 1/8/20   Shy Garcia PA-C   ferrous sulfate 325 (65 Fe) mg tablet Take 325 mg by mouth daily with breakfast    Historical Provider, MD   furosemide (LASIX) 20 mg tablet Take 1 tablet (20 mg total) by mouth daily 8/31/21 9/30/21  Tala Lopez MD   guaiFENesin (MUCINEX) 600 mg 12 hr tablet Take 1 tablet (600 mg total) by mouth 2 (two) times a day 12/23/18   Osvaldo Beck MD   hydrochlorothiazide (HYDRODIURIL) 25 mg tablet Take 1 tablet (25 mg total) by mouth daily 1/7/20   Shy Garcia PA-C   HYDROcodone-acetaminophen (NORCO) 5-325 mg per tablet Take 1 tablet by mouth every 6 (six) hours as needed for pain    Historical Provider, MD   hydrocortisone (ANUSOL-HC) 2.5 % rectal cream Apply topically 2 (two) times a day 5/5/23   TAMMIE Kirk   hydrOXYzine HCL (ATARAX) 25 mg tablet Take 1 tablet (25 mg total) by mouth every 6 (six) hours as needed for anxiety 5/5/23   TAMMIE Kirk   ipratropium (ATROVENT) 0.02 % nebulizer solution Take 2.5 mL (0.5 mg total) by nebulization 3 (three) times a day 5/5/23 6/4/23  TAMMIE Kirk   melatonin 3 mg Take 1 tablet (3 mg total) by mouth daily at bedtime as needed (insomnia)  Patient not taking: Reported on 4/30/2023 1/7/20   TAMMIE Singh   nitroglycerin (NITROSTAT) 0.4 mg SL tablet Place 0.4 mg under the tongue every 5 (five) minutes as needed for chest pain    Historical Provider, MD   sodium chloride 0.9 % nebulizer solution Take 3 mL by nebulization 3 (three) times a day 12/23/18   Osvaldo Beck MD   vilazodone (VIIBRYD) 40 mg tablet Take 40 mg by mouth daily with breakfast    Historical Provider, MD Leblanctinib 80 MG CAPS Take 160 mg by mouth 2 (two) times a day    Historical Provider, MD VALDEZ have reviewed home medications with patient personally. Allergies: Allergies   Allergen Reactions    Sulfa Antibiotics Anaphylaxis    Niacin     Statins Other (See Comments)     cramps       Social History:  Marital Status:    Occupation:   Patient Pre-hospital Living Situation: Home  Patient Pre-hospital Level of Mobility: walks  Patient Pre-hospital Diet Restrictions:   Substance Use History:   Social History     Substance and Sexual Activity   Alcohol Use Never     Social History     Tobacco Use   Smoking Status Every Day    Packs/day: 1.00    Years: 60.00    Total pack years: 60.00    Types: Cigarettes   Smokeless Tobacco Never   Tobacco Comments    She has close to a 60 pack-year smoking history, most recently has cut down to 4-5 cigarettes daily     Social History     Substance and Sexual Activity   Drug Use No       Family History:  Family History   Problem Relation Age of Onset    Heart disease Mother     Cancer Father        Physical Exam:     Vitals:   Blood Pressure: 120/69 (10/29/23 0042)  Pulse: (!) 109 (10/29/23 0042)  Temperature: 98.2 °F (36.8 °C) (10/28/23 2021)  Temp Source: Oral (10/28/23 2021)  Respirations: 22 (10/29/23 0042)  SpO2: 99 % (10/29/23 0042)    Physical Exam  Constitutional:       Comments: Frail-appearing   HENT:      Head: Normocephalic. Nose: Nose normal.      Mouth/Throat:      Mouth: Mucous membranes are moist.   Eyes:      Pupils: Pupils are equal, round, and reactive to light. Cardiovascular:      Rate and Rhythm: Regular rhythm. Tachycardia present. Pulmonary:      Effort: Pulmonary effort is normal.      Breath sounds: Normal breath sounds. Abdominal:      General: Bowel sounds are normal.      Palpations: Abdomen is soft. Musculoskeletal:         General: Normal range of motion. Cervical back: Normal range of motion. Skin:     General: Skin is warm. Neurological:      General: No focal deficit present.       Mental Status: She is alert and oriented to person, place, and time. Psychiatric:         Mood and Affect: Mood normal.          Additional Data:     Lab Results:  Results from last 7 days   Lab Units 10/28/23  2121   WBC Thousand/uL 13.84*   HEMOGLOBIN g/dL 7.0*   HEMATOCRIT % 21.8*   PLATELETS Thousands/uL 216   NEUTROS PCT % 34*   LYMPHS PCT % 55*   MONOS PCT % 6   EOS PCT % 4     Results from last 7 days   Lab Units 10/28/23  2121   SODIUM mmol/L 142   POTASSIUM mmol/L 3.7   CHLORIDE mmol/L 101   CO2 mmol/L 38*   BUN mg/dL 33*   CREATININE mg/dL 1.07   ANION GAP mmol/L 3   CALCIUM mg/dL 8.9   ALBUMIN g/dL 3.6   TOTAL BILIRUBIN mg/dL 0.20   ALK PHOS U/L 59   ALT U/L 3*   AST U/L 9*   GLUCOSE RANDOM mg/dL 113     Results from last 7 days   Lab Units 10/28/23  2121   INR  1.01                   Lines/Drains:  Invasive Devices       Peripheral Intravenous Line  Duration             Peripheral IV 10/28/23 Left Antecubital <1 day                        Imaging: Reviewed radiology reports from this admission including: abdominal/pelvic CT  CT abdomen pelvis with contrast   Final Result by Ivette Goldman MD (10/28 2350)      Colonic diverticulosis. There is mild thickening of a short segment of the sigmoid colon with minimal adjacent fat stranding suspicious for acute diverticulitis. The study was marked in [facility] for immediate notification. Workstation performed: [id number]         XR chest 2 views    (Results Pending)       EKG and Other Studies Reviewed on Admission:   EKG: Sinus Tachycardia. . ** Please Note: This note has been constructed using a voice recognition system.  **

## 2023-10-29 NOTE — ASSESSMENT & PLAN NOTE
Presented with bright red blood per rectum, does have history of hemorrhoids. Patient was admitted earlier this year for similar complaints and was supposed to get colonoscopy in house which she did not get as she had aspiration episode while doing colonoscopy prep she was supposed to get outpatient flex sig ureteroscopy which per patient did not get it done. Rectal bleed likely related to diverticulitis and hemorrhoids. However need to rule out other cause of bleeding she is actively having rectal bleeding in the ER. Hemoglobin has dropped to 7.0 and she is tachycardic. Discussed with the ER physician and started PRBC transfusion. Hold aspirin  GI consultation.

## 2023-10-29 NOTE — ASSESSMENT & PLAN NOTE
Malnutrition Findings:       BMI 13.92  Dietary consultation                          BMI Findings: There is no height or weight on file to calculate BMI.

## 2023-10-29 NOTE — ED NOTES
Patient transported to [address] [place] [address], [address] [place] [address]  10/28/23 2078

## 2023-10-30 ENCOUNTER — ANESTHESIA (INPATIENT)
Dept: GASTROENTEROLOGY | Facility: HOSPITAL | Age: 78
DRG: 377 | End: 2023-10-30
Payer: MEDICARE

## 2023-10-30 ENCOUNTER — APPOINTMENT (INPATIENT)
Dept: GASTROENTEROLOGY | Facility: HOSPITAL | Age: 78
DRG: 377 | End: 2023-10-30
Payer: MEDICARE

## 2023-10-30 ENCOUNTER — ANESTHESIA EVENT (INPATIENT)
Dept: GASTROENTEROLOGY | Facility: HOSPITAL | Age: 78
DRG: 377 | End: 2023-10-30
Payer: MEDICARE

## 2023-10-30 LAB
HGB BLD-MCNC: 7.7 G/DL (ref 11.5–15.4)
HGB BLD-MCNC: 8 G/DL (ref 11.5–15.4)
HGB BLD-MCNC: 8.3 G/DL (ref 11.5–15.4)

## 2023-10-30 PROCEDURE — 0D598ZZ DESTRUCTION OF DUODENUM, VIA NATURAL OR ARTIFICIAL OPENING ENDOSCOPIC: ICD-10-PCS | Performed by: INTERNAL MEDICINE

## 2023-10-30 PROCEDURE — 30233N1 TRANSFUSION OF NONAUTOLOGOUS RED BLOOD CELLS INTO PERIPHERAL VEIN, PERCUTANEOUS APPROACH: ICD-10-PCS | Performed by: INTERNAL MEDICINE

## 2023-10-30 PROCEDURE — 85018 HEMOGLOBIN: CPT | Performed by: PHYSICIAN ASSISTANT

## 2023-10-30 PROCEDURE — 0DB98ZX EXCISION OF DUODENUM, VIA NATURAL OR ARTIFICIAL OPENING ENDOSCOPIC, DIAGNOSTIC: ICD-10-PCS | Performed by: INTERNAL MEDICINE

## 2023-10-30 PROCEDURE — C9113 INJ PANTOPRAZOLE SODIUM, VIA: HCPCS | Performed by: PHYSICIAN ASSISTANT

## 2023-10-30 PROCEDURE — 93005 ELECTROCARDIOGRAM TRACING: CPT

## 2023-10-30 PROCEDURE — 94760 N-INVAS EAR/PLS OXIMETRY 1: CPT

## 2023-10-30 PROCEDURE — 94664 DEMO&/EVAL PT USE INHALER: CPT

## 2023-10-30 PROCEDURE — 0DJD8ZZ INSPECTION OF LOWER INTESTINAL TRACT, VIA NATURAL OR ARTIFICIAL OPENING ENDOSCOPIC: ICD-10-PCS | Performed by: INTERNAL MEDICINE

## 2023-10-30 PROCEDURE — 94640 AIRWAY INHALATION TREATMENT: CPT

## 2023-10-30 PROCEDURE — 99232 SBSQ HOSP IP/OBS MODERATE 35: CPT | Performed by: INTERNAL MEDICINE

## 2023-10-30 PROCEDURE — 88305 TISSUE EXAM BY PATHOLOGIST: CPT | Performed by: SPECIALIST

## 2023-10-30 RX ORDER — PROPOFOL 10 MG/ML
INJECTION, EMULSION INTRAVENOUS AS NEEDED
Status: DISCONTINUED | OUTPATIENT
Start: 2023-10-30 | End: 2023-10-30

## 2023-10-30 RX ORDER — SODIUM CHLORIDE, SODIUM GLUCONATE, SODIUM ACETATE, POTASSIUM CHLORIDE, MAGNESIUM CHLORIDE, SODIUM PHOSPHATE, DIBASIC, AND POTASSIUM PHOSPHATE .53; .5; .37; .037; .03; .012; .00082 G/100ML; G/100ML; G/100ML; G/100ML; G/100ML; G/100ML; G/100ML
500 INJECTION, SOLUTION INTRAVENOUS ONCE
Status: COMPLETED | OUTPATIENT
Start: 2023-10-30 | End: 2023-10-31

## 2023-10-30 RX ORDER — DIPHENHYDRAMINE HCL 25 MG
25 TABLET ORAL
Status: DISCONTINUED | OUTPATIENT
Start: 2023-10-30 | End: 2023-11-03 | Stop reason: HOSPADM

## 2023-10-30 RX ORDER — LIDOCAINE HYDROCHLORIDE 20 MG/ML
INJECTION, SOLUTION EPIDURAL; INFILTRATION; INTRACAUDAL; PERINEURAL AS NEEDED
Status: DISCONTINUED | OUTPATIENT
Start: 2023-10-30 | End: 2023-10-30

## 2023-10-30 RX ORDER — SODIUM CHLORIDE, SODIUM LACTATE, POTASSIUM CHLORIDE, CALCIUM CHLORIDE 600; 310; 30; 20 MG/100ML; MG/100ML; MG/100ML; MG/100ML
INJECTION, SOLUTION INTRAVENOUS CONTINUOUS PRN
Status: DISCONTINUED | OUTPATIENT
Start: 2023-10-30 | End: 2023-10-30

## 2023-10-30 RX ADMIN — BUDESONIDE AND FORMOTEROL FUMARATE DIHYDRATE 2 PUFF: 160; 4.5 AEROSOL RESPIRATORY (INHALATION) at 17:24

## 2023-10-30 RX ADMIN — PIPERACILLIN AND TAZOBACTAM 2.25 G: 2; .25 INJECTION, POWDER, LYOPHILIZED, FOR SOLUTION INTRAVENOUS at 08:39

## 2023-10-30 RX ADMIN — PIPERACILLIN AND TAZOBACTAM 2.25 G: 2; .25 INJECTION, POWDER, LYOPHILIZED, FOR SOLUTION INTRAVENOUS at 00:21

## 2023-10-30 RX ADMIN — PROPOFOL 20 MG: 10 INJECTION, EMULSION INTRAVENOUS at 16:21

## 2023-10-30 RX ADMIN — SODIUM CHLORIDE, SODIUM GLUCONATE, SODIUM ACETATE, POTASSIUM CHLORIDE, MAGNESIUM CHLORIDE, SODIUM PHOSPHATE, DIBASIC, AND POTASSIUM PHOSPHATE 500 ML: .53; .5; .37; .037; .03; .012; .00082 INJECTION, SOLUTION INTRAVENOUS at 22:45

## 2023-10-30 RX ADMIN — SODIUM CHLORIDE, SODIUM LACTATE, POTASSIUM CHLORIDE, AND CALCIUM CHLORIDE: .6; .31; .03; .02 INJECTION, SOLUTION INTRAVENOUS at 15:56

## 2023-10-30 RX ADMIN — PIPERACILLIN AND TAZOBACTAM 2.25 G: 2; .25 INJECTION, POWDER, LYOPHILIZED, FOR SOLUTION INTRAVENOUS at 13:52

## 2023-10-30 RX ADMIN — ZANUBRUTINIB 160 MG: 80 CAPSULE, GELATIN COATED ORAL at 08:35

## 2023-10-30 RX ADMIN — PANTOPRAZOLE SODIUM 40 MG: 40 INJECTION, POWDER, FOR SOLUTION INTRAVENOUS at 00:21

## 2023-10-30 RX ADMIN — PROPOFOL 10 MG: 10 INJECTION, EMULSION INTRAVENOUS at 16:26

## 2023-10-30 RX ADMIN — PROPOFOL 60 MG: 10 INJECTION, EMULSION INTRAVENOUS at 16:10

## 2023-10-30 RX ADMIN — DIPHENHYDRAMINE HYDROCHLORIDE 25 MG: 25 TABLET ORAL at 01:48

## 2023-10-30 RX ADMIN — CARVEDILOL 25 MG: 12.5 TABLET, FILM COATED ORAL at 08:37

## 2023-10-30 RX ADMIN — IPRATROPIUM BROMIDE 0.5 MG: 0.5 SOLUTION RESPIRATORY (INHALATION) at 13:08

## 2023-10-30 RX ADMIN — PANTOPRAZOLE SODIUM 40 MG: 40 INJECTION, POWDER, FOR SOLUTION INTRAVENOUS at 12:15

## 2023-10-30 RX ADMIN — PROPOFOL 40 MG: 10 INJECTION, EMULSION INTRAVENOUS at 16:12

## 2023-10-30 RX ADMIN — PROPOFOL 30 MG: 10 INJECTION, EMULSION INTRAVENOUS at 16:17

## 2023-10-30 RX ADMIN — IPRATROPIUM BROMIDE 0.5 MG: 0.5 SOLUTION RESPIRATORY (INHALATION) at 07:07

## 2023-10-30 RX ADMIN — IPRATROPIUM BROMIDE 0.5 MG: 0.5 SOLUTION RESPIRATORY (INHALATION) at 20:00

## 2023-10-30 RX ADMIN — BUDESONIDE AND FORMOTEROL FUMARATE DIHYDRATE 2 PUFF: 160; 4.5 AEROSOL RESPIRATORY (INHALATION) at 08:38

## 2023-10-30 RX ADMIN — PIPERACILLIN AND TAZOBACTAM 2.25 G: 2; .25 INJECTION, POWDER, LYOPHILIZED, FOR SOLUTION INTRAVENOUS at 19:52

## 2023-10-30 RX ADMIN — ZANUBRUTINIB 160 MG: 80 CAPSULE, GELATIN COATED ORAL at 17:24

## 2023-10-30 RX ADMIN — VILAZODONE HYDROCHLORIDE 40 MG: 20 TABLET ORAL at 08:38

## 2023-10-30 RX ADMIN — PROPOFOL 30 MG: 10 INJECTION, EMULSION INTRAVENOUS at 16:14

## 2023-10-30 RX ADMIN — LIDOCAINE HYDROCHLORIDE 80 MG: 20 INJECTION, SOLUTION EPIDURAL; INFILTRATION; INTRACAUDAL; PERINEURAL at 16:10

## 2023-10-30 RX ADMIN — BUPROPION HYDROCHLORIDE 75 MG: 75 TABLET, FILM COATED ORAL at 08:35

## 2023-10-30 NOTE — ANESTHESIA PREPROCEDURE EVALUATION
Procedure:  COLONOSCOPY  EGD    Relevant Problems   CARDIO   (+) CAD (coronary artery disease), native coronary artery   (+) Essential hypertension      GI/HEPATIC   (+) BRBPR (bright red blood per rectum)      HEMATOLOGY   (+) Anemia   (+) Waldenstrom macroglobulinemia (HCC)      NEURO/PSYCH   (+) Major depressive disorder, recurrent episode, moderate (HCC)      PULMONARY   (+) Chronic obstructive pulmonary disease with acute exacerbation (HCC)   (+) Chronic respiratory failure with hypoxia (HCC)   (+) Pneumonia        Physical Exam    Airway    Mallampati score: II  TM Distance: >3 FB  Neck ROM: full     Dental    upper dentures and lower dentures    Cardiovascular  Cardiovascular exam normal    Pulmonary  Pulmonary exam normal     Other Findings    4L home O2. Breathing stable  Distant stents  Denies N/V      Anesthesia Plan  ASA Score- 4     Anesthesia Type- IV sedation with anesthesia with ASA Monitors. Additional Monitors:     Airway Plan:            Plan Factors-Exercise tolerance (METS): <4 METS. Chart reviewed. Patient summary reviewed. Patient is not a current smoker. Induction- intravenous. Postoperative Plan-     Informed Consent- Anesthetic plan and risks discussed with patient.

## 2023-10-30 NOTE — ASSESSMENT & PLAN NOTE
Presented with bright red blood per rectum, does have history of hemorrhoids. S/p blood transfusion for the same. Hemoglobin improved.   Bleeding now stopped

## 2023-10-30 NOTE — CASE MANAGEMENT
Case Management Assessment & Discharge Planning Note    Patient name Yolis Gan  Location [address] [facility] [place] 329/-[id number] MRN [id number]  : 1945 Date 10/30/2023       Current Admission Date: 10/28/2023  Current Admission Diagnosis:Diverticulitis   Patient Active Problem List    Diagnosis Date Noted    Diverticulitis 10/29/2023    Moderate protein-calorie malnutrition ([address]) 2023    BRBPR (bright red blood per rectum) 2023    Generalized weakness 2023    Staring episodes 2023    Waldenstrom macroglobulinemia ([address]) 2023    Severe protein-calorie malnutrition ([address]) 2021    Positive blood culture 2021    Chronic obstructive pulmonary disease with acute exacerbation ([address]) 2021    SIRS (systemic inflammatory response syndrome) ([address]) 2021    Anemia 2021    Abnormal computed tomography angiography (CTA) 2021    Fatigue 2021    Major depressive disorder, recurrent episode, moderate ([address]) 2019    Flank pain 2019    Pneumonia 2019    Sepsis ([address]) 2019    CAD (coronary artery disease), native coronary artery 2018    Chronic respiratory failure with hypoxia ([address]) 2018    Ambulatory dysfunction 2018    Hypokalemia 2018    Essential hypertension 2018    Tobacco abuse 2018      LOS (days): 1  Geometric Mean LOS (GMLOS) (days): 3.90  Days to GMLOS:2.3     OBJECTIVE:    Risk of Unplanned Readmission Score: 27         Current admission status: Inpatient       Preferred Pharmacy:   [id number] [place] - [address] [facility] [address]  Phone: [phone] Fax: [phone], [address]  [address] [place] [address] [address] [address]  Phone: [phone] Fax: [phone]    Primary Care Provider: Mora Bagley MD    Primary Insurance: MEDICARE  Secondary Insurance:     ASSESSMENT:  [address] [place], [address] [facility] 5 - Son   Primary Phone: [phone] (Home)                           Readmission Root Cause  30 Day Readmission: No    Patient Information  Admitted from[de-identified] Home  Mental Status: Alert  During Assessment patient was accompanied by: Son  Assessment information provided by[de-identified] Son  Primary Caregiver: Family  Caregiver's Name[de-identified] Enrico Obrien [phone]  Caregiver's Relationship to Patient[de-identified] Family Member  Support Systems: Self, [place] of Residence: [address] [place] [address] do you live in?: [address] entry access options.  Select all that apply.: Stairs  Number of steps to enter home.: 5  Do the steps have railings?: Yes  Type of Current Residence: [place]  In the last 12 months, was there a time when you were not able to pay the mortgage or rent on time?: No  In the last 12 months, how many places have you lived?: 2  In the last 12 months, was there a time when you did not have a steady place to sleep or slept in a shelter (including now)?: No  Homeless/housing insecurity resource given?: N/A  Living Arrangements: Lives w/ Son  Is patient a ?: No    Activities of Daily Living Prior to Admission  Functional Status: Assistance  Completes ADLs independently?: No  Level of ADL dependence: Assistance  Ambulates independently?: No  Level of ambulatory dependence: Assistance  Does patient use assisted devices?: Yes  Assisted Devices (DME) used: Walker, Home Oxygen concentrator, Shower Chair, Bedside Commode (These are items at [address])  Does patient currently own DME?: Yes  What DME does the patient currently own?: Briana Rim, Bedside Commode, Shower Chair  Does patient have a history of Outpatient Therapy (PT/OT)?: No  Does the patient have a history of Short-Term Rehab?: Yes ([facility])  Does patient have a history of HHC?: Yes ([facility])  Does patient currently have [facility]?: Yes    Current Home Health Care  Type of Current Home Care Services: Nurse visit, Home PT, Home OT  [address][de-identified] [address]  Provider[de-identified] PCP    Patient Information Continued  Income Source: SSI/SSD  Does patient have prescription coverage?: Yes  Within the past 12 months, you worried that your food would run out before you got the money to buy more.: Never true  Within the past 12 months, the food you bought just didn't last and you didn't have money to get more.: Never true  Food insecurity resource given?: N/A  Does patient receive dialysis treatments?: No  Does patient have a history of substance abuse?: No  Does patient have a history of Mental Health Diagnosis?: Yes (anxiety, depression)  Is patient receiving treatment for mental health?: Yes  Has patient received inpatient treatment related to mental health in the last 2 years?: No    PHQ 2/9 Screening   Reviewed PHQ 2/9 Depression Screening Score?: No    Means of Transportation  Means of Transport to Appts[de-identified] Family transport (son stated he will bring patients O2 from home.)  In the past 12 months, has lack of transportation kept you from medical appointments or from getting medications?: No  In the past 12 months, has lack of transportation kept you from meetings, work, or from getting things needed for daily living?: No  Was application for public transport provided?: N/A        DISCHARGE DETAILS:    Discharge planning discussed with[de-identified] Son via phone  Freedom of Choice: Yes  Comments - Freedom of Choice: CM discussed freedom of choice as it pertains to discharge planning. Patients son stated his mother said she would never go back to rehab due to experience with prior facility. Son stated mother would want to continue with [address]   CM contacted family/caregiver?: Yes  Were Treatment Team discharge recommendations reviewed with patient/caregiver?: Yes  Did patient/caregiver verbalize understanding of patient care needs?: Yes  Were patient/caregiver advised of the risks associated with not following Treatment Team discharge recommendations?: Yes    Contacts  Patient Contacts: Roland Stanley  Relationship to Patient[de-identified] Family  Contact Method: Phone  Phone Number: on facesheet  Reason/Outcome: Discharge [address]         Is the patient interested in [facility] at discharge?: Yes  [facility] requested[de-identified] Nursing, Occupational Therapy, Physical [address] [place] Name[de-identified] [facility] Reason (only applicable if external HHA name selected): Patient has established relationship with provider  [address] [facility] Provider[de-identified] PCP  Home Health Services Needed[de-identified] COPD Management, Evaluate Functional Status and Safety, Strengthening/Theraputic Exercises to Improve Function, Gait/ADL Training, Oxygen Via Nasal Cannula  Homebound Criteria Met[de-identified] Requires the Assistance of Another Person for Safe Ambulation or to Leave the Home, Uses an Assist Device (i.e. cane, walker, etc)  Supporting Clincal Findings[de-identified] Limited Endurance, Fatigues Easliy in [employer], Requires Oxygen      Other Referral/Resources/Interventions Provided:  Interventions: [facility]  Referral Comments: CM sent referral to [facility]    Would you like to participate in our [address] service program?  : No - Declined    Treatment Team Recommendation: Other (TBD)  Discharge Destination Plan[de-identified] Home with [address] [place] [address] at Discharge : Family

## 2023-10-30 NOTE — ANESTHESIA POSTPROCEDURE EVALUATION
Post-Op Assessment Note    CV Status:  Stable  Pain Score: 0    Pain management: adequate     Mental Status:  Alert and awake   Hydration Status:  Stable   PONV Controlled:  None   Airway Patency:  Patent and adequate      Post Op Vitals Reviewed: Yes      Staff: Anesthesiologist, CRNA         No notable events documented.     /58 (10/30/23 1635)    Temp (!) 97.3 °F (36.3 °C) (10/30/23 1635)    Pulse 102 (10/30/23 1635)   Resp 16 (10/30/23 1635)    SpO2 100 % (10/30/23 1635)

## 2023-10-30 NOTE — PROGRESS NOTES
[address]  Progress Note  Name: Luís Tellez  MRN: [id number]  Unit/Bed#: -01 I Date of Admission: 10/28/2023   Date of Service: 10/30/2023 I Hospital Day: 1    Assessment/Plan   BRBPR (bright red blood per rectum)  Assessment & Plan  Presented with bright red blood per rectum, does have history of hemorrhoids. S/p blood transfusion for the same. Hemoglobin improved. Bleeding now stopped    Severe protein-calorie malnutrition ([address])  Assessment & Plan  Malnutrition Findings:                        High-protein diet advised         BMI Findings: There is no height or weight on file to calculate BMI. Major depressive disorder, recurrent episode, moderate (HCC)  Assessment & Plan  Continue bupropion    Chronic respiratory failure with hypoxia (HCC)  Assessment & Plan  Continue 4 L of oxygen for her COPD   avoid smoking    Essential hypertension  Assessment & Plan  Continue carvedilol with holding parameters    Hypokalemia  Assessment & Plan  Replaced, check BMP in a.m. * Diverticulitis  Assessment & Plan  Patient presented with bright red blood per rectum for about 4 days, denies any nausea vomiting abdominal pain but does have leukocytosis. CT abdomen showed diverticulitis   GI input appreciated. When I saw the patient in the morning the patient awaiting colonoscopy           TE Pharmacologic Prophylaxis: Pharmacologic VTE Prophylaxis contraindicated due to GI bleed    Patient Centered Rounds: I have performed bedside rounds with nursing staff today.   Discussions with Specialists or Other Care Team Provider: Gastroenterology  Education and Discussions with Family / Patient: Patient    Current Length of Stay: 1 day(s)  Current Patient Status: Inpatient     Certification Statement: The patient will continue to require additional inpatient hospital stay due to Diverticulitis  Discharge Plan / Estimated Discharge Date: 24 to 48 hours    Code Status: Level 1 - Full Code  ______________________________________________________________________________    Subjective:   Patient seen and examined by me. Patient states that the bleeding has stopped. No fever or chills. No chest pain, palpitations or diaphoresis    Objective:   Vitals: Blood pressure 142/89, pulse (!) 117, temperature 98 °F (36.7 °C), temperature source Temporal, resp. rate 16, SpO2 94 %, not currently breastfeeding. Physical Exam:   General appearance: alert, fatigued, appears stated age, and cooperative  Constitutional- looks a little weak  HEENT - atraumatic and normocephalic  Neck- supple  Skin - no fresh rash  Extremities no fresh focal deformities  Cardiovascular- S1-S2 heard  Respiratory- bilateral air entry present, no crackles or rhonchi  Skin - no fresh rash  Abdomen - normal bowel sounds present, no rebound tenderness  CNS- No fresh focal deficits  Psych- no acute psychosis     Additional Data:   Labs:  Results from last 7 days   Lab Units 10/30/23  1223 10/30/23  0555 10/30/23  0005 10/29/23  2026 10/29/23  1320 10/29/23  0719 10/28/23  2121   WBC Thousand/uL  --   --   --   --   --  11.07* 13.84*   HEMOGLOBIN g/dL 7.7* 8.0* 8.3* 7.9* 7.2* 8.3* 7.0*   HEMATOCRIT %  --   --   --   --   --  24.6* 21.8*   MCV fL  --   --   --   --   --  90 97   PLATELETS Thousands/uL  --   --   --   --   --  176 216   INR   --   --   --   --   --   --  1.01     Results from last 7 days   Lab Units 10/29/23  0719 10/28/23  2121   SODIUM mmol/L 140 142   POTASSIUM mmol/L 3.5 3.7   CHLORIDE mmol/L 100 101   CO2 mmol/L 34* 38*   ANION GAP mmol/L 6 3   BUN mg/dL 28* 33*   CREATININE mg/dL 1.00 1.07   CALCIUM mg/dL 8.8 8.9   ALBUMIN g/dL  --  3.6   TOTAL BILIRUBIN mg/dL  --  0.20   ALK PHOS U/L  --  59   ALT U/L  --  3*   AST U/L  --  9*   EGFR ml/min/1.73sq m 54 50   GLUCOSE RANDOM mg/dL 102 113                                  * I Have Reviewed All Lab Data Listed Above.     Cultures:   Results from last 7 days   Lab Units 10/28/23  2121   INFLUENZA A PCR  Negative     Results from last 7 days   Lab Units 10/28/23  2121   INFLUENZA A PCR  Negative   INFLUENZA B PCR  Negative   RSV PCR  Negative         Imaging:  Imaging Reports Reviewed Today Include:   Colonoscopy    Result Date: 10/30/2023  Impression: Left-sided diverticulosis with old blood clots likely indicating a diverticular bleed RECOMMENDATION:  No further screening colonoscopies necessary  Age greater than 65  Venofer course Monitor CBC Diet as tolerated Discharge per [facility] We will arrange for an outpatient video capsule endoscopy. At the present time I will sign off please reconsult as needed  Marshall Koenig MD     EGD    Result Date: 10/30/2023  Impression: Mild erosive gastritis Duodenal bulb angiectasia status post APC RECOMMENDATION: PPI course Venofer course Monitor CBC   Marshall Koenig MD     XR chest 2 views    Result Date: 10/29/2023  Impression: No active pulmonary disease. Workstation performed: [id number]     CT abdomen pelvis with contrast    Result Date: 10/28/2023  Impression: Colonic diverticulosis. There is mild thickening of a short segment of the sigmoid colon with minimal adjacent fat stranding suspicious for acute diverticulitis. The study was marked in [facility] for immediate notification.  Workstation performed: [id number]     Scheduled Meds:  Current Facility-Administered Medications   Medication Dose Route Frequency Provider Last Rate    albuterol  2 puff Inhalation Q6H PRN Marshall Koenig MD      budesonide-formoterol  2 puff Inhalation BID Marshall Koenig MD      buPROPion  75 mg Oral Daily Paty Mercado III, MD      carvedilol  25 mg Oral BID With Meals Paty Mercado III, MD      diphenhydrAMINE  25 mg Oral HS PRN Marshall Koenig MD      ipratropium  0.5 mg Nebulization TID Marshall Koenig MD      nicotine  1 patch Transdermal Daily Paty Mercado III, MD      pantoprazole  40 mg Intravenous Q12H Cynthia Minor Rodri Archibald MD      piperacillin-tazobactam  2.25 g Intravenous Q6H Louis Mckay MD 2.25 g (10/30/23 6522)    vilazodone  40 mg Oral Daily With Breakfast Louis Mckay MD      Zanubrutinib  160 mg Oral BID MD Stella Torres MD  [facility] Internal Medicine    ** Please Note: This note has been constructed using a voice recognition system.  **

## 2023-10-30 NOTE — ASSESSMENT & PLAN NOTE
Patient presented with bright red blood per rectum for about 4 days, denies any nausea vomiting abdominal pain but does have leukocytosis. CT abdomen showed diverticulitis   GI input appreciated.   When I saw the patient in the morning the patient awaiting colonoscopy

## 2023-10-30 NOTE — CASE MANAGEMENT
Case Management Discharge Planning Note    Patient name Kj Spain  Location [address] [facility] 329/-01 MRN [id number]  : 1945 Date 10/30/2023       Current Admission Date: 10/28/2023  Current Admission Diagnosis:Diverticulitis   Patient Active Problem List    Diagnosis Date Noted    Diverticulitis 10/29/2023    Moderate protein-calorie malnutrition ([address]) 2023    BRBPR (bright red blood per rectum) 2023    Generalized weakness 2023    Staring episodes 2023    Waldenstrom macroglobulinemia ([address]) 2023    Severe protein-calorie malnutrition ([address]) 2021    Positive blood culture 2021    Chronic obstructive pulmonary disease with acute exacerbation ([address]) 2021    SIRS (systemic inflammatory response syndrome) ([address]) 2021    Anemia 2021    Abnormal computed tomography angiography (CTA) 2021    Fatigue 2021    Major depressive disorder, recurrent episode, moderate ([address]) 2019    Flank pain 2019    Pneumonia 2019    Sepsis ([address]) 2019    CAD (coronary artery disease), native coronary artery 2018    Chronic respiratory failure with hypoxia ([address]) 2018    Ambulatory dysfunction 2018    Hypokalemia 2018    Essential hypertension 2018    Tobacco abuse 2018      LOS (days): 1  Geometric Mean LOS (GMLOS) (days): 3.90  Days to GMLOS:2.2     OBJECTIVE:  Risk of Unplanned Readmission Score: 27.5         Current admission status: Inpatient   Preferred Pharmacy:   [id number] [address] [address] [address]  [address]  Phone: [phone] Fax: [phone], [address]  [address] [address] [place] [facility] [address]  Phone: [phone] Fax: [phone]    Primary Care Provider: Josy Arroyo MD    Primary Insurance: MEDICARE  Secondary Insurance:     DISCHARGE DETAILS:  [address] reserved.      [facility] [address], [address] [place] [address]  Phone: [phone]  Fax: [phone]

## 2023-10-30 NOTE — QUICK NOTE
Notified by RN that pt reported to PCA that earlier tonight she fell unwitnessed. Per RN room near nurses station and no fall was seen or heard. Pt reports she went to get up off the bed onto the commode right next to the bedside and her foot slipped but she caught herself and did not actually fall. denies any pain or head strike. Was able get back into bed on her own no issues before letting PCA know what happened. 2/2 no new pain and pt without actual fall on to the floor will defer imaging at this time.   Pt declined provider reaching out to family now, family to be updated later in am.

## 2023-10-30 NOTE — RESPIRATORY THERAPY NOTE
RT Protocol Note  Yolis Gan 69 y.o. female MRN: [id number]  Unit/Bed#: -01 Encounter: [id number]    Assessment    Principal Problem:    Diverticulitis  Active Problems:    Hypokalemia    Essential hypertension    Chronic respiratory failure with hypoxia (HCC)    Major depressive disorder, recurrent episode, moderate (HCC)    Anemia    Severe protein-calorie malnutrition (HCC)    BRBPR (bright red blood per rectum)      Home Pulmonary Medications:     10/30/23 1308   Respiratory Protocol   Protocol Initiated? No   Protocol Selection Respiratory   Language Barrier? No   Medical & Social History Reviewed? Yes   Diagnostic Studies Reviewed? Yes   Physical Assessment Performed? Yes   Home Devices/Therapy Home O2   Respiratory Plan Home Bronchodilator Patient pathway   Respiratory Assessment   Assessment Type Pre-treatment   General Appearance Alert; Awake   Respiratory Pattern Normal   Chest Assessment Chest expansion symmetrical   Bilateral Breath Sounds Diminished   Resp Comments continue home regimen   O2 Device nc   Additional Assessments   Pulse (!) 113   SpO2 99 %       Home Devices/Therapy: Home O2    Past Medical History:   Diagnosis Date    Acute congestive heart failure (HCC) 8/27/2021    Anxiety     Cardiac disease     Chest pain 8/27/2021    Chronic pain disorder     COPD (chronic obstructive pulmonary disease) (HCC)     Depression     Heart disease     Hyperlipidemia     Hypertension     MI (myocardial infarction) (HCC)     MRSA (methicillin resistant Staphylococcus aureus)     Renal disorder     benign kidney tumor     Social History     Socioeconomic History    Marital status:      Spouse name: None    Number of children: 3    Years of education: 13    Highest education level: High school graduate   Occupational History    Occupation:      Employer: [employer]     Comment: retired    Tobacco Use    Smoking status: Former     Packs/day: 1.00     Years: 60.00 Total pack years: 60.00     Types: Cigarettes     Quit date: 2016     Years since quittin.8    Smokeless tobacco: Never    Tobacco comments:     She has close to a 60 pack-year smoking history, most recently has cut down to 4-5 cigarettes daily   Vaping Use    Vaping Use: Never used   Substance and Sexual Activity    Alcohol use: Never    Drug use: No    Sexual activity: Not Currently   Other Topics Concern    None   Social History Narrative    None     Social Determinants of Health     Financial Resource Strain: Medium Risk (1/3/2020)    Overall Financial Resource Strain (CARDIA)     Difficulty of Paying Living Expenses: Somewhat hard   Food Insecurity: No Food Insecurity (2023)    Hunger Vital Sign     Worried About Running Out of Food in the Last Year: Never true     Ran Out of Food in the Last Year: Never true   Transportation Needs: No Transportation Needs (2023)    PRAPARE - Transportation     Lack of Transportation (Medical): No     Lack of Transportation (Non-Medical): No   Physical Activity: Inactive (1/3/2020)    Exercise Vital Sign     Days of Exercise per Week: 0 days     Minutes of Exercise per Session: 0 min   Stress: No Stress Concern Present (1/3/2020)    [facility]     Feeling of Stress : Only a little   Social Connections: Socially Isolated (1/3/2020)    Social Connection and Isolation Panel [NHANES]     Frequency of Communication with Friends and Family: Twice a week     Frequency of Social Gatherings with Friends and Family: Once a week     Attends Yazdanism Services: Never     Active Member of Clubs or Organizations: No     Attends Club or Organization Meetings: Never     Marital Status:    Intimate Partner Violence: Not At Risk (1/3/2020)    Humiliation, Afraid, Rape, and Kick questionnaire     Fear of Current or Ex-Partner: No     Emotionally Abused: No     Physically Abused: No     Sexually Abused:  No Housing Stability: Low Risk  (5/1/2023)    Housing Stability Vital Sign     Unable to Pay for Housing in the Last Year: No     Number of Places Lived in the Last Year: 2     Unstable Housing in the Last Year: No       Subjective         Objective    Physical Exam:   Assessment Type: Pre-treatment  General Appearance: Alert, Awake  Respiratory Pattern: Normal  Chest Assessment: Chest expansion symmetrical  Bilateral Breath Sounds: Diminished  O2 Device: nc    Vitals:  Blood pressure 145/91, pulse (!) 113, temperature 97.9 °F (36.6 °C), resp. rate 17, SpO2 99 %, not currently breastfeeding. Imaging and other studies: I have personally reviewed pertinent reports.       O2 Device: nc     Plan    Respiratory Plan: Home Bronchodilator Patient pathway        Resp Comments: continue home regimen

## 2023-10-30 NOTE — NURSING NOTE
Patient rang call bell and told PCA that she had fallen. PCA alerted me. Upon entering the room patient was on the bed. She looked comfortable, not in distress. Patient reported not hitting head. No complaints of pain. Notified on call provider. Upon further evaluation with the provider patient stated she did not actually fall on the ground. Patient reenacted how she grabbed onto the commode, then tried to take a step forward and her foot slid and she fell back onto the bed.

## 2023-10-30 NOTE — ASSESSMENT & PLAN NOTE
Malnutrition Findings:                        High-protein diet advised         BMI Findings: There is no height or weight on file to calculate BMI.

## 2023-10-30 NOTE — PLAN OF CARE
Problem: GASTROINTESTINAL - ADULT  Goal: Maintains or returns to baseline bowel function  Description: INTERVENTIONS:  - Assess bowel function  - Encourage oral fluids to ensure adequate hydration  - Administer IV fluids if ordered to ensure adequate hydration  - Administer ordered medications as needed  - Encourage mobilization and activity  - Consider nutritional services referral to assist patient with adequate nutrition and appropriate food choices  Outcome: Progressing  Note: Continues to have melena and carlos red blood in stool at times. Due for EGD today. Bowel prep complete.     Goal: Maintains adequate nutritional intake  Description: INTERVENTIONS:  - Monitor percentage of each meal consumed  - Identify factors contributing to decreased intake, treat as appropriate  - Assist with meals as needed  - Monitor I&O, weight, and lab values if indicated  - Obtain nutrition services referral as needed  Outcome: Progressing

## 2023-10-31 ENCOUNTER — TELEPHONE (OUTPATIENT)
Dept: GASTROENTEROLOGY | Facility: CLINIC | Age: 78
End: 2023-10-31

## 2023-10-31 LAB
ANION GAP SERPL CALCULATED.3IONS-SCNC: 3 MMOL/L
ANION GAP SERPL CALCULATED.3IONS-SCNC: 6 MMOL/L
BUN SERPL-MCNC: 11 MG/DL (ref 5–25)
BUN SERPL-MCNC: 14 MG/DL (ref 5–25)
CALCIUM SERPL-MCNC: 8.2 MG/DL (ref 8.4–10.2)
CALCIUM SERPL-MCNC: 8.4 MG/DL (ref 8.4–10.2)
CHLORIDE SERPL-SCNC: 103 MMOL/L (ref 96–108)
CHLORIDE SERPL-SCNC: 107 MMOL/L (ref 96–108)
CO2 SERPL-SCNC: 32 MMOL/L (ref 21–32)
CO2 SERPL-SCNC: 32 MMOL/L (ref 21–32)
CREAT SERPL-MCNC: 0.92 MG/DL (ref 0.6–1.3)
CREAT SERPL-MCNC: 1 MG/DL (ref 0.6–1.3)
GFR SERPL CREATININE-BSD FRML MDRD: 54 ML/MIN/1.73SQ M
GFR SERPL CREATININE-BSD FRML MDRD: 60 ML/MIN/1.73SQ M
GLUCOSE SERPL-MCNC: 100 MG/DL (ref 65–140)
GLUCOSE SERPL-MCNC: 98 MG/DL (ref 65–140)
HGB BLD-MCNC: 7 G/DL (ref 11.5–15.4)
HGB BLD-MCNC: 7.2 G/DL (ref 11.5–15.4)
POTASSIUM SERPL-SCNC: 3 MMOL/L (ref 3.5–5.3)
POTASSIUM SERPL-SCNC: 3.5 MMOL/L (ref 3.5–5.3)
SODIUM SERPL-SCNC: 141 MMOL/L (ref 135–147)
SODIUM SERPL-SCNC: 142 MMOL/L (ref 135–147)

## 2023-10-31 PROCEDURE — C9113 INJ PANTOPRAZOLE SODIUM, VIA: HCPCS | Performed by: INTERNAL MEDICINE

## 2023-10-31 PROCEDURE — 85018 HEMOGLOBIN: CPT | Performed by: INTERNAL MEDICINE

## 2023-10-31 PROCEDURE — 99233 SBSQ HOSP IP/OBS HIGH 50: CPT | Performed by: STUDENT IN AN ORGANIZED HEALTH CARE EDUCATION/TRAINING PROGRAM

## 2023-10-31 PROCEDURE — 80048 BASIC METABOLIC PNL TOTAL CA: CPT

## 2023-10-31 PROCEDURE — 94664 DEMO&/EVAL PT USE INHALER: CPT

## 2023-10-31 PROCEDURE — 80048 BASIC METABOLIC PNL TOTAL CA: CPT | Performed by: STUDENT IN AN ORGANIZED HEALTH CARE EDUCATION/TRAINING PROGRAM

## 2023-10-31 PROCEDURE — 94640 AIRWAY INHALATION TREATMENT: CPT

## 2023-10-31 PROCEDURE — 94760 N-INVAS EAR/PLS OXIMETRY 1: CPT

## 2023-10-31 RX ORDER — MAGNESIUM SULFATE 1 G/100ML
1 INJECTION INTRAVENOUS ONCE
Status: COMPLETED | OUTPATIENT
Start: 2023-10-31 | End: 2023-11-01

## 2023-10-31 RX ORDER — POTASSIUM CHLORIDE 20 MEQ/1
40 TABLET, EXTENDED RELEASE ORAL ONCE
Status: COMPLETED | OUTPATIENT
Start: 2023-10-31 | End: 2023-10-31

## 2023-10-31 RX ORDER — POTASSIUM CHLORIDE 14.9 MG/ML
20 INJECTION INTRAVENOUS ONCE
Status: COMPLETED | OUTPATIENT
Start: 2023-10-31 | End: 2023-11-01

## 2023-10-31 RX ORDER — MAGNESIUM SULFATE HEPTAHYDRATE 40 MG/ML
2 INJECTION, SOLUTION INTRAVENOUS ONCE
Status: COMPLETED | OUTPATIENT
Start: 2023-10-31 | End: 2023-11-01

## 2023-10-31 RX ADMIN — PIPERACILLIN AND TAZOBACTAM 2.25 G: 2; .25 INJECTION, POWDER, LYOPHILIZED, FOR SOLUTION INTRAVENOUS at 01:28

## 2023-10-31 RX ADMIN — BUPROPION HYDROCHLORIDE 75 MG: 75 TABLET, FILM COATED ORAL at 08:09

## 2023-10-31 RX ADMIN — PANTOPRAZOLE SODIUM 40 MG: 40 INJECTION, POWDER, FOR SOLUTION INTRAVENOUS at 14:37

## 2023-10-31 RX ADMIN — IPRATROPIUM BROMIDE 0.5 MG: 0.5 SOLUTION RESPIRATORY (INHALATION) at 13:27

## 2023-10-31 RX ADMIN — IPRATROPIUM BROMIDE 0.5 MG: 0.5 SOLUTION RESPIRATORY (INHALATION) at 07:06

## 2023-10-31 RX ADMIN — CARVEDILOL 25 MG: 12.5 TABLET, FILM COATED ORAL at 18:04

## 2023-10-31 RX ADMIN — PIPERACILLIN AND TAZOBACTAM 2.25 G: 2; .25 INJECTION, POWDER, LYOPHILIZED, FOR SOLUTION INTRAVENOUS at 08:08

## 2023-10-31 RX ADMIN — CARVEDILOL 25 MG: 12.5 TABLET, FILM COATED ORAL at 08:08

## 2023-10-31 RX ADMIN — IRON SUCROSE 200 MG: 20 INJECTION, SOLUTION INTRAVENOUS at 19:20

## 2023-10-31 RX ADMIN — POTASSIUM CHLORIDE 20 MEQ: 14.9 INJECTION, SOLUTION INTRAVENOUS at 02:30

## 2023-10-31 RX ADMIN — POTASSIUM CHLORIDE 40 MEQ: 1500 TABLET, EXTENDED RELEASE ORAL at 01:28

## 2023-10-31 RX ADMIN — DIPHENHYDRAMINE HYDROCHLORIDE 25 MG: 25 TABLET ORAL at 01:56

## 2023-10-31 RX ADMIN — PANTOPRAZOLE SODIUM 40 MG: 40 INJECTION, POWDER, FOR SOLUTION INTRAVENOUS at 23:56

## 2023-10-31 RX ADMIN — ZANUBRUTINIB 160 MG: 80 CAPSULE, GELATIN COATED ORAL at 18:05

## 2023-10-31 RX ADMIN — MAGNESIUM SULFATE HEPTAHYDRATE 2 G: 40 INJECTION, SOLUTION INTRAVENOUS at 01:40

## 2023-10-31 RX ADMIN — MAGNESIUM SULFATE HEPTAHYDRATE 1 G: 1 INJECTION, SOLUTION INTRAVENOUS at 18:04

## 2023-10-31 RX ADMIN — PANTOPRAZOLE SODIUM 40 MG: 40 INJECTION, POWDER, FOR SOLUTION INTRAVENOUS at 00:45

## 2023-10-31 RX ADMIN — PIPERACILLIN AND TAZOBACTAM 2.25 G: 2; .25 INJECTION, POWDER, LYOPHILIZED, FOR SOLUTION INTRAVENOUS at 14:37

## 2023-10-31 RX ADMIN — VILAZODONE HYDROCHLORIDE 40 MG: 20 TABLET ORAL at 08:11

## 2023-10-31 RX ADMIN — IPRATROPIUM BROMIDE 0.5 MG: 0.5 SOLUTION RESPIRATORY (INHALATION) at 19:37

## 2023-10-31 RX ADMIN — BUDESONIDE AND FORMOTEROL FUMARATE DIHYDRATE 2 PUFF: 160; 4.5 AEROSOL RESPIRATORY (INHALATION) at 18:05

## 2023-10-31 RX ADMIN — ZANUBRUTINIB 160 MG: 80 CAPSULE, GELATIN COATED ORAL at 08:10

## 2023-10-31 NOTE — TELEPHONE ENCOUNTER
Patient is currently hospitalized. Quinn Rodriguez She will phone back to schedule the capsule endoscopy. Quinn Rodriguez

## 2023-10-31 NOTE — ASSESSMENT & PLAN NOTE
Malnutrition Findings:                        High-protein diet advised         BMI Findings: Body mass index is 18.5 kg/m².

## 2023-10-31 NOTE — PLAN OF CARE
Problem: MOBILITY - ADULT  Goal: Maintain or return to baseline ADL function  Description: INTERVENTIONS:  -  Assess patient's ability to carry out ADLs; assess patient's baseline for ADL function and identify physical deficits which impact ability to perform ADLs (bathing, care of mouth/teeth, toileting, grooming, dressing, etc.)  - Assess/evaluate cause of self-care deficits   - Assess range of motion  - Assess patient's mobility; develop plan if impaired  - Assess patient's need for assistive devices and provide as appropriate  - Encourage maximum independence but intervene and supervise when necessary  - Involve family in performance of ADLs  - Assess for home care needs following discharge   - Consider OT consult to assist with ADL evaluation and planning for discharge  - Provide patient education as appropriate  Outcome: Progressing  Goal: Maintains/Returns to pre admission functional level  Description: INTERVENTIONS:  - Perform BMAT or MOVE assessment daily.   - Set and communicate daily mobility goal to care team and patient/family/caregiver. - Collaborate with rehabilitation services on mobility goals if consulted  - Perform Range of Motion  times a day. - Reposition patient every  hours.   - Dangle patient  times a day  - Stand patient  times a day  - Ambulate patient  times a day  - Out of bed to chair times a day   - Out of bed for meals times a day  - Out of bed for toileting  - Record patient progress and toleration of activity level   Outcome: Progressing     Problem: DISCHARGE PLANNING  Goal: Discharge to home or other facility with appropriate resources  Description: INTERVENTIONS:  - Identify barriers to discharge w/patient and caregiver  - Arrange for needed discharge resources and transportation as appropriate  - Identify discharge learning needs (meds, wound care, etc.)  - Arrange for interpretive services to assist at discharge as needed  - Refer to Case Management Department for coordinating discharge planning if the patient needs post-hospital services based on physician/advanced practitioner order or complex needs related to functional status, cognitive ability, or social support system  Outcome: Progressing     Problem: Knowledge Deficit  Goal: Patient/family/caregiver demonstrates understanding of disease process, treatment plan, medications, and discharge instructions  Description: Complete learning assessment and assess knowledge base.   Interventions:  - Provide teaching at level of understanding  - Provide teaching via preferred learning methods  Outcome: Progressing     Problem: Prexisting or High Potential for Compromised Skin Integrity  Goal: Skin integrity is maintained or improved  Description: INTERVENTIONS:  - Identify patients at risk for skin breakdown  - Assess and monitor skin integrity  - Assess and monitor nutrition and hydration status  - Monitor labs   - Assess for incontinence   - Turn and reposition patient  - Assist with mobility/ambulation  - Relieve pressure over bony prominences  - Avoid friction and shearing  - Provide appropriate hygiene as needed including keeping skin clean and dry  - Evaluate need for skin moisturizer/barrier cream  - Collaborate with interdisciplinary team   - Patient/family teaching  - Consider wound care consult   Outcome: Progressing     Problem: GASTROINTESTINAL - ADULT  Goal: Maintains or returns to baseline bowel function  Description: INTERVENTIONS:  - Assess bowel function  - Encourage oral fluids to ensure adequate hydration  - Administer IV fluids if ordered to ensure adequate hydration  - Administer ordered medications as needed  - Encourage mobilization and activity  - Consider nutritional services referral to assist patient with adequate nutrition and appropriate food choices  Outcome: Progressing  Goal: Maintains adequate nutritional intake  Description: INTERVENTIONS:  - Monitor percentage of each meal consumed  - Identify factors contributing to decreased intake, treat as appropriate  - Assist with meals as needed  - Monitor I&O, weight, and lab values if indicated  - Obtain nutrition services referral as needed  Outcome: Progressing

## 2023-10-31 NOTE — CASE MANAGEMENT
Case Management Discharge Planning Note    Patient name Sully Moreira  Location [address] [facility] [place] 329/-01 MRN [id number]  : 1945 Date 10/31/2023       Current Admission Date: 10/28/2023  Current Admission Diagnosis:Diverticulitis   Patient Active Problem List    Diagnosis Date Noted    Diverticulitis 10/29/2023    Moderate protein-calorie malnutrition ([address]) 2023    BRBPR (bright red blood per rectum) 2023    Generalized weakness 2023    Staring episodes 2023    Waldenstrom macroglobulinemia ([address]) 2023    Severe protein-calorie malnutrition ([address]) 2021    Positive blood culture 2021    Chronic obstructive pulmonary disease with acute exacerbation ([address]) 2021    SIRS (systemic inflammatory response syndrome) ([address]) 2021    Anemia 2021    Abnormal computed tomography angiography (CTA) 2021    Fatigue 2021    Major depressive disorder, recurrent episode, moderate ([address]) 2019    Flank pain 2019    Pneumonia 2019    Sepsis ([address]) 2019    CAD (coronary artery disease), native coronary artery 2018    Chronic respiratory failure with hypoxia ([address]) 2018    Ambulatory dysfunction 2018    Hypokalemia 2018    Essential hypertension 2018    Tobacco abuse 2018      LOS (days): 2  Geometric Mean LOS (GMLOS) (days): 4.60  Days to GMLOS:2.1     OBJECTIVE:  Risk of Unplanned Readmission Score: 29.28         Current admission status: Inpatient   Preferred Pharmacy:   [id number] [place] [address]  [address] [facility] [address]  Phone: [phone] Fax: [phone], [address] [facility]  [address] [address] [address] [address]  Phone: [phone] Fax: [phone]    Primary Care Provider: Kirsty De Leon MD    Primary Insurance: MEDICARE  Secondary Insurance:     DISCHARGE DETAILS:     IMM Given (Date):: 10/31/23  IMM Given to[de-identified] Patient (CM reviewed IMM with patient at bedside.  Patient reported understanding their rights and denied any questions or concerns Patient was given a copy and signed copy was placed in medical records.)

## 2023-10-31 NOTE — RESPIRATORY THERAPY NOTE
RT Protocol Note  Eden Villafuerte 69 y.o. female MRN: [id number]  Unit/Bed#: -01 Encounter: [id number]    Assessment    Principal Problem:    Diverticulitis  Active Problems:    Hypokalemia    Essential hypertension    Chronic respiratory failure with hypoxia (HCC)    Major depressive disorder, recurrent episode, moderate (HCC)    Anemia    Severe protein-calorie malnutrition (HCC)    BRBPR (bright red blood per rectum)      Home Pulmonary Medications:     10/30/23 2000   Respiratory Protocol   Protocol Initiated? No   Protocol Selection Respiratory   Language Barrier? No   Medical & Social History Reviewed? Yes   Diagnostic Studies Reviewed? Yes   Physical Assessment Performed? Yes   Home Devices/Therapy Home O2   Respiratory Plan Home Bronchodilator Patient pathway   Respiratory Assessment   Assessment Type Assess only   General Appearance Alert; Awake   Respiratory Pattern Normal   Chest Assessment Chest expansion symmetrical   Bilateral Breath Sounds Diminished   Location Specific No   Cough None   Resp Comments Resp protocol completed.  Continue with current tx plan   O2 Device NC   Additional Assessments   Pulse (!) 126   Respirations 18   SpO2 98 %       Home Devices/Therapy: Home O2    Past Medical History:   Diagnosis Date    Acute congestive heart failure ([address]) 8/27/2021    Anxiety     Cardiac disease     Chest pain 8/27/2021    Chronic pain disorder     COPD (chronic obstructive pulmonary disease) (HCC)     Depression     Heart disease     Hyperlipidemia     Hypertension     MI (myocardial infarction) ([address])     MRSA (methicillin resistant Staphylococcus aureus)     Renal disorder     benign kidney tumor     Social History     Socioeconomic History    Marital status:      Spouse name: None    Number of children: 3    Years of education: 13    Highest education level: High school graduate   Occupational History    Occupation: [address]      Employer: [employer]     Comment: retired    Tobacco Use    Smoking status: Former     Packs/day: 1.00     Years: 60.00     Total pack years: 60.00     Types: Cigarettes     Quit date: 2016     Years since quittin.8    Smokeless tobacco: Never    Tobacco comments:     She has close to a 60 pack-year smoking history, most recently has cut down to 4-5 cigarettes daily   Vaping Use    Vaping Use: Never used   Substance and Sexual Activity    Alcohol use: Never    Drug use: No    Sexual activity: Not Currently   Other Topics Concern    None   Social History Narrative    None     Social Determinants of Health     Financial Resource Strain: Medium Risk (1/3/2020)    Overall Financial Resource Strain (CARDIA)     Difficulty of Paying Living Expenses: Somewhat hard   Food Insecurity: No Food Insecurity (10/30/2023)    Hunger Vital Sign     Worried About Running Out of Food in the Last Year: Never true     Ran Out of Food in the Last Year: Never true   Transportation Needs: No Transportation Needs (10/30/2023)    PRAPARE - Transportation     Lack of Transportation (Medical): No     Lack of Transportation (Non-Medical): No   Physical Activity: Inactive (1/3/2020)    Exercise Vital Sign     Days of Exercise per Week: 0 days     Minutes of Exercise per Session: 0 min   Stress: No Stress Concern Present (1/3/2020)    [facility]     Feeling of Stress :  Only a little   Social Connections: Socially Isolated (1/3/2020)    Social Connection and Isolation Panel [NHANES]     Frequency of Communication with Friends and Family: Twice a week     Frequency of Social Gatherings with Friends and Family: Once a week     Attends Restoration Services: Never     Active Member of Clubs or Organizations: No     Attends Club or Organization Meetings: Never     Marital Status:    Intimate Partner Violence: Not At Risk (1/3/2020)    Humiliation, Afraid, Rape, and Kick questionnaire     Fear of Current or Ex-Partner: No     Emotionally Abused: No     Physically Abused: No     Sexually Abused: No   Housing Stability: Low Risk  (10/30/2023)    Housing Stability Vital Sign     Unable to Pay for Housing in the Last Year: No     Number of Places Lived in the Last Year: 2     Unstable Housing in the Last Year: No       Subjective         Objective    Physical Exam:   Assessment Type: Assess only  General Appearance: Alert, Awake  Respiratory Pattern: Normal  Chest Assessment: Chest expansion symmetrical  Bilateral Breath Sounds: Diminished  Location Specific: No  Cough: None  O2 Device: NC    Vitals:  Blood pressure 142/89, pulse (!) 126, temperature 98 °F (36.7 °C), temperature source Temporal, resp. rate 18, SpO2 98 %, not currently breastfeeding. Imaging and other studies: I have personally reviewed pertinent reports. O2 Device: NC     Plan    Respiratory Plan: Home Bronchodilator Patient pathway        Resp Comments: Resp protocol completed.  Continue with current tx plan

## 2023-10-31 NOTE — RESPIRATORY THERAPY NOTE
10/31/23 0719   Respiratory Protocol   Protocol Initiated? No   Protocol Selection Respiratory   Language Barrier? No   Medical & Social History Reviewed? Yes   Diagnostic Studies Reviewed? Yes   Physical Assessment Performed? Yes   Home Devices/Therapy Home O2   Respiratory Plan Home Bronchodilator Patient pathway   Respiratory Assessment   Assessment Type Post-treatment   General Appearance Alert; Awake   Respiratory Pattern Normal   Chest Assessment Chest expansion symmetrical   Bilateral Breath Sounds Diminished   Resp Comments RN notified about high HR. pt tolerated tx well. will keep txs the same as per home regimen   O2 Device nc   Additional Assessments   Respirations 18

## 2023-10-31 NOTE — PROGRESS NOTES
[address]  Progress Note  Name: Kyle Gardiner  MRN: [id number]  Unit/Bed#: -01 I Date of Admission: 10/28/2023   Date of Service: 10/31/2023 I Hospital Day: 2    Assessment/Plan   * Diverticulitis  Assessment & Plan  Patient presented with bright red blood per rectum for about 4 days, denies any nausea vomiting abdominal pain but does have leukocytosis. .  CT abdomen showed likely diverticulitis   EGD noted with gastritis, bulb angiectasia status post APC treatment with  Colonoscopy noted with left-sided diverticulosis with blood clot. Diverticulitis has been ruled out. Discontinue IV Zosyn. Continue PPI, Venofer course. Trend hemoglobin level. Cleared by GI for discharge with outpatient capsule endoscopy. Currently plan for discharge tomorrow if hemoglobin remain stable. BRBPR (bright red blood per rectum)  Assessment & Plan  Diverticular bleed based on colonoscopy report. Patient does report having some dark stool however hemoglobin stable. Trend hemoglobin level. Severe protein-calorie malnutrition ([address])  Assessment & Plan  Malnutrition Findings:                        High-protein diet advised         BMI Findings: Body mass index is 18.5 kg/m². Anemia  Assessment & Plan  Secondary to diverticular bleed. Current hemoglobin stable around 7.2. Trend hemoglobin level. Major depressive disorder, recurrent episode, moderate (HCC)  Assessment & Plan  Continue bupropion    Chronic respiratory failure with hypoxia (HCC)  Assessment & Plan  Continue 4 L of oxygen for her COPD   Former smoker. Essential hypertension  Assessment & Plan  Continue carvedilol with holding parameters    Hypokalemia  Assessment & Plan  Resolved with repeat BMP later on in the afternoon. Currently K  3.5                 VTE Pharmacologic Prophylaxis:   Moderate Risk (Score 3-4) - Pharmacological DVT Prophylaxis Contraindicated.  Sequential Compression Devices Ordered. Patient Centered Rounds: I performed bedside rounds with nursing staff today. Discussions with Specialists or Other Care Team Provider: GI      Current Length of Stay: 2 day(s)  Current Patient Status: Inpatient   Certification Statement: The patient will continue to require additional inpatient hospital stay due to trending hemoglobin level. Discharge Plan: Anticipate discharge tomorrow to home. Code Status: Level 1 - Full Code    Subjective:   Seen during AM rounds. Patient overall reports feeling well and eager to go home. Does report having some dark stool however hemoglobin stable around 7.2. Denies fever, chills, nausea, vomiting, abdominal pain, dysuria, diarrhea, any other new complaints. No other events reported. Of note: Patient denies having fall 2 nights ago. Patient reports that when she was trying to get out of the bed her foot slide a litter bit and she did not have a fall. Denies any new complaints. Objective:     Vitals:   Temp (24hrs), Av.2 °F (36.8 °C), Min:97.9 °F (36.6 °C), Max:98.5 °F (36.9 °C)    Temp:  [97.9 °F (36.6 °C)-98.5 °F (36.9 °C)] 97.9 °F (36.6 °C)  HR:  [102-126] 102  Resp:  [18-20] 18  BP: (140-180)/() 141/82  SpO2:  [92 %-100 %] 99 %  Body mass index is 18.5 kg/m². Input and Output Summary (last 24 hours): Intake/Output Summary (Last 24 hours) at 10/31/2023 1719  Last data filed at 10/30/2023 2240  Gross per 24 hour   Intake 400 ml   Output --   Net 400 ml       Physical Exam:   Physical Exam  Constitutional:       General: She is not in acute distress. Appearance: Normal appearance. She is not ill-appearing, toxic-appearing or diaphoretic. HENT:      Head: Normocephalic and atraumatic. Eyes:      Pupils: Pupils are equal, round, and reactive to light. Cardiovascular:      Rate and Rhythm: Normal rate. Pulses: Normal pulses. Pulmonary:      Effort: Pulmonary effort is normal. No respiratory distress.       Breath sounds: Normal breath sounds. No wheezing. Comments: O2 sat 99% on 2L NC. Abdominal:      General: Bowel sounds are normal. There is no distension. Palpations: Abdomen is soft. Tenderness: There is no abdominal tenderness. Musculoskeletal:      Cervical back: No rigidity. Right lower leg: No edema. Left lower leg: No edema. Neurological:      Mental Status: She is alert and oriented to person, place, and time. Mental status is at baseline. Psychiatric:         Mood and Affect: Mood normal.         Behavior: Behavior normal.         Additional Data:     Labs:  Results from last 7 days   Lab Units 10/31/23  0647 10/29/23  1320 10/29/23  0719   WBC Thousand/uL  --   --  11.07*   HEMOGLOBIN g/dL 7.2*   < > 8.3*   HEMATOCRIT %  --   --  24.6*   PLATELETS Thousands/uL  --   --  176   NEUTROS PCT %  --   --  37*   LYMPHS PCT %  --   --  51*   MONOS PCT %  --   --  6   EOS PCT %  --   --  4    < > = values in this interval not displayed. Results from last 7 days   Lab Units 10/31/23  1219 10/29/23  0719 10/28/23  2121   SODIUM mmol/L 142   < > 142   POTASSIUM mmol/L 3.5   < > 3.7   CHLORIDE mmol/L 107   < > 101   CO2 mmol/L 32   < > 38*   BUN mg/dL 11   < > 33*   CREATININE mg/dL 0.92   < > 1.07   ANION GAP mmol/L 3   < > 3   CALCIUM mg/dL 8.4   < > 8.9   ALBUMIN g/dL  --   --  3.6   TOTAL BILIRUBIN mg/dL  --   --  0.20   ALK PHOS U/L  --   --  59   ALT U/L  --   --  3*   AST U/L  --   --  9*   GLUCOSE RANDOM mg/dL 100   < > 113    < > = values in this interval not displayed.      Results from last 7 days   Lab Units 10/28/23  2121   INR  1.01                   Lines/Drains:  Invasive Devices       Peripheral Intravenous Line  Duration             Peripheral IV 10/28/23 Left Antecubital 2 days    Peripheral IV 10/30/23 Right;Dorsal (posterior) Forearm 1 day    Peripheral IV 10/31/23 Left;Ventral (anterior) Forearm <1 day                  2      Recent Cultures (last 7 days):         Last 24 Hours Medication List:   Current Facility-Administered Medications   Medication Dose Route Frequency Provider Last Rate    albuterol  2 puff Inhalation Q6H PRN Roney Haney MD      budesonide-formoterol  2 puff Inhalation BID Roney Haney MD      buPROPion  75 mg Oral Daily Aaron Vuong III, MD      carvedilol  25 mg Oral BID With Meals Aaron Vuong III, MD      diphenhydrAMINE  25 mg Oral HS PRN Aaron Vuong III, MD      ipratropium  0.5 mg Nebulization TID Roney Haney MD      nicotine  1 patch Transdermal Daily Aaron Vuong III, MD      pantoprazole  40 mg Intravenous Q12H Roney Haney MD      vilazodone  40 mg Oral Daily With Breakfast Roney Haney MD      Zanubrutinib  160 mg Oral BID Roney Haney MD          Today, Patient Was Seen By: Adrianne Marks MD    **Please Note: This note may have been constructed using a voice recognition system. **

## 2023-10-31 NOTE — RESPIRATORY THERAPY NOTE
RT Protocol Note  Orlena Numbers 69 y.o. female MRN: [id number]  Unit/Bed#: -01 Encounter: [id number]    Assessment    Principal Problem:    Diverticulitis  Active Problems:    Hypokalemia    Essential hypertension    Chronic respiratory failure with hypoxia (HCC)    Major depressive disorder, recurrent episode, moderate (HCC)    Anemia    Severe protein-calorie malnutrition (HCC)    BRBPR (bright red blood per rectum)      Home Pulmonary Medications:     10/31/23 1937   Respiratory Protocol   Protocol Initiated? No   Protocol Selection Respiratory   Language Barrier? No   Medical & Social History Reviewed? Yes   Diagnostic Studies Reviewed? Yes   Physical Assessment Performed? Yes   Home Devices/Therapy Home O2   Respiratory Plan Home Bronchodilator Patient pathway   Respiratory Assessment   Assessment Type During-treatment   General Appearance Alert; Awake   Respiratory Pattern Normal   Chest Assessment Chest expansion symmetrical   Bilateral Breath Sounds Diminished   Location Specific No   Cough None   Resp Comments Resp protocol completed.  Will continue with current tx plan   O2 Device NC   Cough Description   Sputum Amount None   Additional Assessments   Pulse (!) 117   Respirations 20   SpO2 99 %       Home Devices/Therapy: Home O2    Past Medical History:   Diagnosis Date    Acute congestive heart failure (HCC) 8/27/2021    Anxiety     Cardiac disease     Chest pain 8/27/2021    Chronic pain disorder     COPD (chronic obstructive pulmonary disease) (HCC)     Depression     Heart disease     Hyperlipidemia     Hypertension     MI (myocardial infarction) ([address])     MRSA (methicillin resistant Staphylococcus aureus)     Renal disorder     benign kidney tumor     Social History     Socioeconomic History    Marital status:      Spouse name: None    Number of children: 3    Years of education: 13    Highest education level: High school graduate   Occupational History    Occupation: [address]  Employer: [employer]     Comment: retired    Tobacco Use    Smoking status: Former     Packs/day: 1.00     Years: 60.00     Total pack years: 60.00     Types: Cigarettes     Quit date: 2016     Years since quittin.8    Smokeless tobacco: Never    Tobacco comments:     She has close to a 60 pack-year smoking history, most recently has cut down to 4-5 cigarettes daily   Vaping Use    Vaping Use: Never used   Substance and Sexual Activity    Alcohol use: Never    Drug use: No    Sexual activity: Not Currently   Other Topics Concern    None   Social History Narrative    None     Social Determinants of Health     Financial Resource Strain: Medium Risk (1/3/2020)    Overall Financial Resource Strain (CARDIA)     Difficulty of Paying Living Expenses: Somewhat hard   Food Insecurity: No Food Insecurity (10/30/2023)    Hunger Vital Sign     Worried About Running Out of Food in the Last Year: Never true     Ran Out of Food in the Last Year: Never true   Transportation Needs: No Transportation Needs (10/30/2023)    PRAPARE - Transportation     Lack of Transportation (Medical): No     Lack of Transportation (Non-Medical): No   Physical Activity: Inactive (1/3/2020)    Exercise Vital Sign     Days of Exercise per Week: 0 days     Minutes of Exercise per Session: 0 min   Stress: No Stress Concern Present (1/3/2020)    [facility]     Feeling of Stress :  Only a little   Social Connections: Socially Isolated (1/3/2020)    Social Connection and Isolation Panel [NHANES]     Frequency of Communication with Friends and Family: Twice a week     Frequency of Social Gatherings with Friends and Family: Once a week     Attends Quaker Services: Never     Active Member of Clubs or Organizations: No     Attends Club or Organization Meetings: Never     Marital Status:    Intimate Partner Violence: Not At Risk (1/3/2020)    Humiliation, Afraid, Rape, and Kick questionnaire     Fear of Current or Ex-Partner: No     Emotionally Abused: No     Physically Abused: No     Sexually Abused: No   Housing Stability: Low Risk  (10/30/2023)    Housing Stability Vital Sign     Unable to Pay for Housing in the Last Year: No     Number of Places Lived in the Last Year: 2     Unstable Housing in the Last Year: No       Subjective         Objective    Physical Exam:   Assessment Type: During-treatment  General Appearance: Alert, Awake  Respiratory Pattern: Normal  Chest Assessment: Chest expansion symmetrical  Bilateral Breath Sounds: Diminished  Location Specific: No  Cough: None  O2 Device: NC    Vitals:  Blood pressure 141/82, pulse (!) 117, temperature 97.9 °F (36.6 °C), resp. rate 20, height 5' 8" (1.727 m), weight 55.2 kg (121 lb 11.1 oz), SpO2 99 %, not currently breastfeeding. Imaging and other studies: I have personally reviewed pertinent reports. O2 Device: NC     Plan    Respiratory Plan: Home Bronchodilator Patient pathway        Resp Comments: Resp protocol completed.  Will continue with current tx plan

## 2023-10-31 NOTE — ASSESSMENT & PLAN NOTE
Patient presented with bright red blood per rectum for about 4 days, denies any nausea vomiting abdominal pain but does have leukocytosis. .  CT abdomen showed likely diverticulitis   EGD noted with gastritis, bulb angiectasia status post APC treatment with  Colonoscopy noted with left-sided diverticulosis with blood clot. Diverticulitis has been ruled out. Discontinue IV Zosyn. Continue PPI, Venofer course. Trend hemoglobin level. Cleared by GI for discharge with outpatient capsule endoscopy. Currently plan for discharge tomorrow if hemoglobin remain stable.

## 2023-10-31 NOTE — ASSESSMENT & PLAN NOTE
Diverticular bleed based on colonoscopy report. Patient does report having some dark stool however hemoglobin stable. Trend hemoglobin level. details…

## 2023-11-01 LAB
ATRIAL RATE: 121 BPM
ATRIAL RATE: 122 BPM
BASOPHILS # BLD AUTO: 0.03 THOUSANDS/ÂΜL (ref 0–0.1)
BASOPHILS NFR BLD AUTO: 1 % (ref 0–1)
EOSINOPHIL # BLD AUTO: 0.43 THOUSAND/ÂΜL (ref 0–0.61)
EOSINOPHIL NFR BLD AUTO: 7 % (ref 0–6)
ERYTHROCYTE [DISTWIDTH] IN BLOOD BY AUTOMATED COUNT: 16 % (ref 11.6–15.1)
ERYTHROCYTE [DISTWIDTH] IN BLOOD BY AUTOMATED COUNT: 17 % (ref 11.6–15.1)
HCT VFR BLD AUTO: 22 % (ref 34.8–46.1)
HCT VFR BLD AUTO: 23.2 % (ref 34.8–46.1)
HGB BLD-MCNC: 6.9 G/DL (ref 11.5–15.4)
HGB BLD-MCNC: 7.2 G/DL (ref 11.5–15.4)
IMM GRANULOCYTES # BLD AUTO: 0.03 THOUSAND/UL (ref 0–0.2)
IMM GRANULOCYTES NFR BLD AUTO: 1 % (ref 0–2)
LYMPHOCYTES # BLD AUTO: 3.12 THOUSANDS/ÂΜL (ref 0.6–4.47)
LYMPHOCYTES NFR BLD AUTO: 49 % (ref 14–44)
MAGNESIUM SERPL-MCNC: 2.3 MG/DL (ref 1.9–2.7)
MCH RBC QN AUTO: 30.3 PG (ref 26.8–34.3)
MCH RBC QN AUTO: 30.6 PG (ref 26.8–34.3)
MCHC RBC AUTO-ENTMCNC: 31 G/DL (ref 31.4–37.4)
MCHC RBC AUTO-ENTMCNC: 31.4 G/DL (ref 31.4–37.4)
MCV RBC AUTO: 97 FL (ref 82–98)
MCV RBC AUTO: 99 FL (ref 82–98)
MONOCYTES # BLD AUTO: 0.48 THOUSAND/ÂΜL (ref 0.17–1.22)
MONOCYTES NFR BLD AUTO: 8 % (ref 4–12)
NEUTROPHILS # BLD AUTO: 2.09 THOUSANDS/ÂΜL (ref 1.85–7.62)
NEUTS SEG NFR BLD AUTO: 34 % (ref 43–75)
NRBC BLD AUTO-RTO: 0 /100 WBCS
P AXIS: 78 DEGREES
P AXIS: 80 DEGREES
PLATELET # BLD AUTO: 153 THOUSANDS/UL (ref 149–390)
PLATELET # BLD AUTO: 179 THOUSANDS/UL (ref 149–390)
PMV BLD AUTO: 10.1 FL (ref 8.9–12.7)
PMV BLD AUTO: 9.4 FL (ref 8.9–12.7)
PR INTERVAL: 144 MS
PR INTERVAL: 144 MS
QRS AXIS: 88 DEGREES
QRS AXIS: 89 DEGREES
QRSD INTERVAL: 86 MS
QRSD INTERVAL: 88 MS
QT INTERVAL: 310 MS
QT INTERVAL: 324 MS
QTC INTERVAL: 440 MS
QTC INTERVAL: 461 MS
RBC # BLD AUTO: 2.28 MILLION/UL (ref 3.81–5.12)
RBC # BLD AUTO: 2.35 MILLION/UL (ref 3.81–5.12)
T WAVE AXIS: -5 DEGREES
T WAVE AXIS: -61 DEGREES
VENTRICULAR RATE: 121 BPM
VENTRICULAR RATE: 122 BPM
WBC # BLD AUTO: 6.18 THOUSAND/UL (ref 4.31–10.16)
WBC # BLD AUTO: 8.45 THOUSAND/UL (ref 4.31–10.16)

## 2023-11-01 PROCEDURE — 97163 PT EVAL HIGH COMPLEX 45 MIN: CPT

## 2023-11-01 PROCEDURE — 94640 AIRWAY INHALATION TREATMENT: CPT

## 2023-11-01 PROCEDURE — 97535 SELF CARE MNGMENT TRAINING: CPT

## 2023-11-01 PROCEDURE — C9113 INJ PANTOPRAZOLE SODIUM, VIA: HCPCS | Performed by: INTERNAL MEDICINE

## 2023-11-01 PROCEDURE — 94664 DEMO&/EVAL PT USE INHALER: CPT

## 2023-11-01 PROCEDURE — 99232 SBSQ HOSP IP/OBS MODERATE 35: CPT | Performed by: STUDENT IN AN ORGANIZED HEALTH CARE EDUCATION/TRAINING PROGRAM

## 2023-11-01 PROCEDURE — 97110 THERAPEUTIC EXERCISES: CPT

## 2023-11-01 PROCEDURE — 97166 OT EVAL MOD COMPLEX 45 MIN: CPT

## 2023-11-01 PROCEDURE — 83735 ASSAY OF MAGNESIUM: CPT | Performed by: STUDENT IN AN ORGANIZED HEALTH CARE EDUCATION/TRAINING PROGRAM

## 2023-11-01 PROCEDURE — 85025 COMPLETE CBC W/AUTO DIFF WBC: CPT | Performed by: STUDENT IN AN ORGANIZED HEALTH CARE EDUCATION/TRAINING PROGRAM

## 2023-11-01 PROCEDURE — 94760 N-INVAS EAR/PLS OXIMETRY 1: CPT

## 2023-11-01 PROCEDURE — 93010 ELECTROCARDIOGRAM REPORT: CPT | Performed by: INTERNAL MEDICINE

## 2023-11-01 PROCEDURE — 85027 COMPLETE CBC AUTOMATED: CPT | Performed by: STUDENT IN AN ORGANIZED HEALTH CARE EDUCATION/TRAINING PROGRAM

## 2023-11-01 RX ORDER — LANOLIN ALCOHOL/MO/W.PET/CERES
6 CREAM (GRAM) TOPICAL
Status: DISCONTINUED | OUTPATIENT
Start: 2023-11-01 | End: 2023-11-03 | Stop reason: HOSPADM

## 2023-11-01 RX ADMIN — MELATONIN 6 MG: at 21:27

## 2023-11-01 RX ADMIN — VILAZODONE HYDROCHLORIDE 40 MG: 20 TABLET ORAL at 08:08

## 2023-11-01 RX ADMIN — IPRATROPIUM BROMIDE 0.5 MG: 0.5 SOLUTION RESPIRATORY (INHALATION) at 19:39

## 2023-11-01 RX ADMIN — IPRATROPIUM BROMIDE 0.5 MG: 0.5 SOLUTION RESPIRATORY (INHALATION) at 13:25

## 2023-11-01 RX ADMIN — ZANUBRUTINIB 160 MG: 80 CAPSULE, GELATIN COATED ORAL at 08:05

## 2023-11-01 RX ADMIN — CARVEDILOL 25 MG: 12.5 TABLET, FILM COATED ORAL at 17:07

## 2023-11-01 RX ADMIN — CARVEDILOL 25 MG: 12.5 TABLET, FILM COATED ORAL at 08:07

## 2023-11-01 RX ADMIN — BUDESONIDE AND FORMOTEROL FUMARATE DIHYDRATE 2 PUFF: 160; 4.5 AEROSOL RESPIRATORY (INHALATION) at 17:07

## 2023-11-01 RX ADMIN — PANTOPRAZOLE SODIUM 40 MG: 40 INJECTION, POWDER, FOR SOLUTION INTRAVENOUS at 13:30

## 2023-11-01 RX ADMIN — BUDESONIDE AND FORMOTEROL FUMARATE DIHYDRATE 2 PUFF: 160; 4.5 AEROSOL RESPIRATORY (INHALATION) at 08:08

## 2023-11-01 RX ADMIN — DIPHENHYDRAMINE HYDROCHLORIDE 25 MG: 25 TABLET ORAL at 21:27

## 2023-11-01 RX ADMIN — DIPHENHYDRAMINE HYDROCHLORIDE 25 MG: 25 TABLET ORAL at 02:28

## 2023-11-01 RX ADMIN — BUPROPION HYDROCHLORIDE 75 MG: 75 TABLET, FILM COATED ORAL at 08:07

## 2023-11-01 RX ADMIN — ZANUBRUTINIB 160 MG: 80 CAPSULE, GELATIN COATED ORAL at 17:07

## 2023-11-01 NOTE — PLAN OF CARE
Problem: OCCUPATIONAL THERAPY ADULT  Goal: Performs self-care activities at highest level of function for planned discharge setting. See evaluation for individualized goals. Description: Treatment Interventions: ADL retraining, Functional transfer training, Endurance training, Patient/family training          See flowsheet documentation for full assessment, interventions and recommendations. Outcome: Progressing  Note: Limitation: Decreased ADL status, Decreased endurance, Decreased high-level ADLs     Assessment: Pt is a 69 y.o. female seen for OT evaluation s/p admit to [facility] on 10/28/2023 w/ Diverticulitis. Comorbidities affecting pt's functional performance at time of assessment include: HTN, limited hearing, previous surgery, COPD, and CAD, malnutrition . Personal factors affecting pt at time of IE include:steps to enter environment, limited home support, difficulty performing ADLS, difficulty performing IADLS , and health management . Prior to admission, pt was independent with most basic ADLs and functional mobility with RW but with significantly limited activity tolerance. Upon evaluation: Pt requires supervision to Minimal Assistance with RW for mobility, and Minimal Assistance with some basic ADLs 2* the following deficits impacting occupational performance: decreased strength, decreased balance, and decreased tolerance. Pt to benefit from continued skilled OT tx while in the hospital to address deficits as defined above and maximize level of functional independence w ADL's and functional mobility. Occupational Performance areas to address include: grooming, bathing/shower, toilet hygiene, dressing, and functional mobility. From OT standpoint, recommendation at time of d/c would be Level 2 Moderate Resource Intensity.      Rehab Resource Intensity Level, OT: II (Moderate Resource Intensity)        SELINA Angela/CEDRICK

## 2023-11-01 NOTE — ASSESSMENT & PLAN NOTE
Diverticular bleed based on colonoscopy report. Patient does report having some dark stool however hemoglobin stable. Hemoglobin currently 6.9. Threshold to transfuse below 6.5 due to O - blood type. Trend hemoglobin level.

## 2023-11-01 NOTE — PROGRESS NOTES
[address]  Progress Note  Name: Peter Garza  MRN: [id number]  Unit/Bed#: -01 I Date of Admission: 10/28/2023   Date of Service: 11/1/2023 I Hospital Day: 3    Assessment/Plan   * Diverticulitis  Assessment & Plan  Patient presented with bright red blood per rectum for about 4 days, denies any nausea vomiting abdominal pain but does have leukocytosis. .  CT abdomen showed likely diverticulitis   EGD noted with gastritis, bulb angiectasia status post APC treatment with  Colonoscopy noted with left-sided diverticulosis with blood clot. Diverticulitis has been ruled out. Discontinue IV Zosyn. Continue PPI, Venofer course. Trend hemoglobin level. Cleared by GI for discharge with outpatient capsule endoscopy. Currently plan for discharge tomorrow if hemoglobin remain stable. BRBPR (bright red blood per rectum)  Assessment & Plan  Diverticular bleed based on colonoscopy report. Patient does report having some dark stool however hemoglobin stable. Hemoglobin currently 6.9. Threshold to transfuse below 6.5 due to O - blood type. Trend hemoglobin level. Severe protein-calorie malnutrition ([address])  Assessment & Plan  Malnutrition Findings:                        High-protein diet advised         BMI Findings: Body mass index is 18.5 kg/m². Anemia  Assessment & Plan  Secondary to diverticular bleed. Current hemoglobin stable around 6.9  Trend hemoglobin level. Major depressive disorder, recurrent episode, moderate (HCC)  Assessment & Plan  Continue bupropion    Chronic respiratory failure with hypoxia [facility])  Assessment & Plan  Patient is home oxygen dependent 4 L at baseline. Currently O2 saturation 99% on 2 L nasal cannula. Former smoker. Essential hypertension  Assessment & Plan  Continue carvedilol with holding parameters    Hypokalemia  Assessment & Plan  Resolved with repeat BMP later on in the afternoon.   Currently K  3.5                 VTE Pharmacologic Prophylaxis:   Moderate Risk (Score 3-4) - Pharmacological DVT Prophylaxis Contraindicated. Sequential Compression Devices Ordered. Patient Centered Rounds: I performed bedside rounds with nursing staff today. Current Length of Stay: 3 day(s)  Current Patient Status: Inpatient   Certification Statement: The patient will continue to require additional inpatient hospital stay due to hemoglobin downtrending 6.9. Discharge Plan: Anticipate discharge in 48-72 hrs to discharge location to be determined pending rehab evaluations. Code Status: Level 1 - Full Code    Subjective:   Seen during AM rounds. Patient does complain of having poor sleep and a bit of confusion due to lack of sleep. Also complaining of having some dark bowel movement. Denies any other new complaints. No other events reported. Objective:     Vitals:   Temp (24hrs), Av.8 °F (36.6 °C), Min:97.6 °F (36.4 °C), Max:97.9 °F (36.6 °C)    Temp:  [97.6 °F (36.4 °C)-97.9 °F (36.6 °C)] 97.9 °F (36.6 °C)  HR:  [] 110  Resp:  [20] 20  BP: (118-175)/() 144/77  SpO2:  [93 %-100 %] 93 %  Body mass index is 18.5 kg/m². Input and Output Summary (last 24 hours): Intake/Output Summary (Last 24 hours) at 2023 1613  Last data filed at 2023 0851  Gross per 24 hour   Intake 660 ml   Output --   Net 660 ml       Physical Exam:   Physical Exam  Constitutional:       General: She is not in acute distress. Appearance: Normal appearance. She is not ill-appearing, toxic-appearing or diaphoretic. HENT:      Head: Normocephalic and atraumatic. Eyes:      Pupils: Pupils are equal, round, and reactive to light. Cardiovascular:      Rate and Rhythm: Normal rate. Pulses: Normal pulses. Pulmonary:      Effort: Pulmonary effort is normal. No respiratory distress. Breath sounds: Normal breath sounds. No wheezing. Comments: O2 sat 99% on 2L NC.    Abdominal:      General: Bowel sounds are normal. There is no distension. Palpations: Abdomen is soft. Tenderness: There is no abdominal tenderness. Musculoskeletal:      Cervical back: No rigidity. Right lower leg: No edema. Left lower leg: No edema. Neurological:      Mental Status: She is alert and oriented to person, place, and time. Mental status is at baseline. Psychiatric:         Mood and Affect: Mood normal.         Behavior: Behavior normal.         Additional Data:     Labs:  Results from last 7 days   Lab Units 11/01/23  1009   WBC Thousand/uL 6.18   HEMOGLOBIN g/dL 6.9*   HEMATOCRIT % 22.0*   PLATELETS Thousands/uL 153   NEUTROS PCT % 34*   LYMPHS PCT % 49*   MONOS PCT % 8   EOS PCT % 7*     Results from last 7 days   Lab Units 10/31/23  1219 10/29/23  0719 10/28/23  2121   SODIUM mmol/L 142   < > 142   POTASSIUM mmol/L 3.5   < > 3.7   CHLORIDE mmol/L 107   < > 101   CO2 mmol/L 32   < > 38*   BUN mg/dL 11   < > 33*   CREATININE mg/dL 0.92   < > 1.07   ANION GAP mmol/L 3   < > 3   CALCIUM mg/dL 8.4   < > 8.9   ALBUMIN g/dL  --   --  3.6   TOTAL BILIRUBIN mg/dL  --   --  0.20   ALK PHOS U/L  --   --  59   ALT U/L  --   --  3*   AST U/L  --   --  9*   GLUCOSE RANDOM mg/dL 100   < > 113    < > = values in this interval not displayed.      Results from last 7 days   Lab Units 10/28/23  2121   INR  1.01                   Lines/Drains:  Invasive Devices       Peripheral Intravenous Line  Duration             Peripheral IV 10/30/23 Right;Dorsal (posterior) Forearm 2 days    Peripheral IV 10/31/23 Left;Ventral (anterior) Forearm 1 day                          Recent Cultures (last 7 days):         Last 24 Hours Medication List:   Current Facility-Administered Medications   Medication Dose Route Frequency Provider Last Rate    albuterol  2 puff Inhalation Q6H PRN John Gaming MD      budesonide-formoterol  2 puff Inhalation BID John Gaming MD      buPROPion  75 mg Oral Daily John Gaming MD      carvedilol  25 mg Oral BID With Meals Nannette Becerra III, MD      diphenhydrAMINE  25 mg Oral HS PRN Nannette Becerra III, MD      ipratropium  0.5 mg Nebulization TID Louis Mckay MD      nicotine  1 patch Transdermal Daily Nannette Becerra III, MD      pantoprazole  40 mg Intravenous Q12H Louis Mckay MD      vilazodone  40 mg Oral Daily With Breakfast Louis Mckay MD      Zanubrutinib  160 mg Oral BID Louis Mckay MD          Today, Patient Was Seen By: Kenneth Rossi MD    **Please Note: This note may have been constructed using a voice recognition system. **

## 2023-11-01 NOTE — PHYSICAL THERAPY NOTE
Physical Therapy Evaluation     Patient's Name: Marsha Stanford    Admitting Diagnosis  Diverticulitis [K57.92]  Bright red blood per rectum [K62.5]  Rectal bleeding [K62.5]  BRBPR (bright red blood per rectum) [K62.5]    Problem List  Patient Active Problem List   Diagnosis    Ambulatory dysfunction    Hypokalemia    Essential hypertension    Tobacco abuse    Chronic respiratory failure with hypoxia (HCC)    CAD (coronary artery disease), native coronary artery    Flank pain    Pneumonia    Sepsis ([address])    Major depressive disorder, recurrent episode, moderate (HCC)    Fatigue    Chronic obstructive pulmonary disease with acute exacerbation (HCC)    SIRS (systemic inflammatory response syndrome) ([facility])    Anemia    Abnormal computed tomography angiography (CTA)    Severe protein-calorie malnutrition (HCC)    Positive blood culture    Waldenstrom macroglobulinemia (HCC)    Generalized weakness    Staring episodes    BRBPR (bright red blood per rectum)    Moderate protein-calorie malnutrition ([address])    Diverticulitis       Past Medical History  Past Medical History:   Diagnosis Date    Acute congestive heart failure ([address]) 8/27/2021    Anxiety     Cardiac disease     Chest pain 8/27/2021    Chronic pain disorder     COPD (chronic obstructive pulmonary disease) (HCC)     Depression     Heart disease     Hyperlipidemia     Hypertension     MI (myocardial infarction) ([address])     MRSA (methicillin resistant Staphylococcus aureus)     Renal disorder     benign kidney tumor       Past Surgical History  Past Surgical History:   Procedure Laterality Date    APPENDECTOMY      ELBOW BURSA SURGERY Left 02/2018    D/T MRSA INFECTION    SPINAL FUSION      L7 L9            11/01/23 1010   PT Last Visit   PT Visit Date 11/01/23   Note Type   Note type Evaluation   Pain Assessment   Pain Assessment Tool 0-10   Pain Score No Pain   Restrictions/Precautions   Weight Bearing Precautions Per Order No   Other Precautions Fall Risk;Multiple lines; O2;Bed Alarm; Chair Alarm   Home Living   Type of [address] [place] [address] Dr One level;Stairs to enter with rails  (4)   Bathroom Shower/Tub Tub/shower unit   [address] bars in shower;Grab bars around toilet; Tub transfer bench   Bathroom Accessibility Accessible;Accessible via walker   Home Equipment Walker;Cane;Wheelchair-manual   Additional Comments Pt. ambulates short distances at baseline with rollator   Prior Function   Level of [place] Independent with ADLs; Independent with functional mobility; Independent with IADLS   Lives With Son  (pt wants to eventually go back to her own home)   Receives Help From Family   IADLs Family/Friend/Other provides transportation; Independent with medication management; Family/Friend/Other provides meals   Falls in the last 6 months 0   Comments Pt. hasn't been walking longer distances. Cognition   Overall Cognitive Status WFL   Arousal/Participation Alert   Attention Within functional limits   Orientation Level Oriented X4   Memory Within functional limits   Following Commands Follows multistep commands without difficulty   Subjective   Subjective "I want to go back to my own home eventually"   RLE Assessment   RLE Assessment X   Strength RLE   RLE Overall Strength 4-/5   LLE Assessment   LLE Assessment X   Strength LLE   LLE Overall Strength 4-/5   Bed Mobility   Supine to Sit 5  Supervision   Additional items Assist x 1;HOB elevated; Increased time required   Sit to Supine 5  Supervision   Additional items Assist x 1; Increased time required;Verbal cues;HOB elevated; Bedrails   Transfers   Sit to Stand 5  Supervision   Additional items Assist x 1; Increased time required;Verbal cues   Stand to Sit 5  Supervision   Additional items Assist x 1; Increased time required   Toilet transfer 4  Minimal assistance   Additional items Assist x 1; Increased time required;Standard toilet  (patricia basreed)   Additional Comments RW utilized for upright mobility   Ambulation/Elevation   Gait pattern Decreased foot clearance; Short stride; Excessively slow   Gait Assistance 4  Minimal assist   Additional items Assist x 1   Assistive Device Rolling walker   Distance 20'   Stair Management Assistance Not tested   Balance   Static Sitting Fair +   Dynamic Sitting Fair   Static Standing Fair -   Dynamic Standing Poor +   Ambulatory Fair -   Activity Tolerance   Activity Tolerance Patient limited by fatigue   Medical Staff Made Aware [address] OT   Nurse Made Aware yes   Assessment   Prognosis Good   Problem List Decreased strength;Decreased endurance; Impaired balance;Decreased mobility; Decreased safety awareness   Assessment Pt is 69 y.o. female seen for PT evaluation s/p admit to [address] [facility] on 10/28/2023 w/ Diverticulitis. PT consulted to assess pt's functional mobility and d/c needs. Order placed for PT eval and tx, w/  activity  order. Comorbidities affecting pt's physical performance at time of assessment include: HTN, chronic respiratory failure, Major depressive order, anemia, diverticulitis, ambulatory dysfunction. PTA, pt was independent w/ all functional mobility w/ RW . Personal factors affecting pt at time of IE include: lives in one story house, ambulating w/ assistive device, stairs to enter home, limited home support, and inability to perform IADLs. Please find objective findings from PT assessment regarding body systems outlined above with impairments and limitations including weakness, impaired balance, decreased endurance, gait deviations, pain, decreased functional mobility tolerance, fall risk, and SOB upon exertion. The following objective measures performed on IE also reveal limitations: AM-PAC 6-Clicks: 59/20. Pt's clinical presentation is currently unstable/unpredictable seen in pt's presentation. Pt to benefit from continued PT tx to address deficits as defined above and maximize level of functional independent mobility and consistency.  From PT/mobility standpoint, recommendation at time of d/c would be Moderate resource intensity pending progress in order to facilitate return to PLOF. Barriers to Discharge Decreased caregiver support; Other (Comment)  (decline inf unctional baseline)   Goals   Patient Goals to go to rehab to get stronger   STG Expiration Date 11/15/23   Short Term Goal #1 1. Pt will complete bed mobility with Mod I to increase functional mobility. 2. Pt will complete sit to stand transfers with Mod I to increase functional mobility. 3. Pt will ambulate 150 ft with RW with Mod I without LOB 4. Pt will increase B/L LE strength by 1 grade to facilitate improved functional mobility with decreased risk of falls. 5. Pt will increase standing balance to fair in order to decrease risk of falls. PT Treatment Day 0   Plan   Treatment/Interventions Functional transfer training;LE strengthening/ROM; Therapeutic exercise; Endurance training;Bed mobility; Equipment eval/education;Gait training;Patient/family training;Spoke to nursing   PT Frequency 3-5x/wk   Discharge Recommendation   Rehab Resource Intensity Level, PT II (Moderate Resource Intensity)   AM-PAC Basic Mobility Inpatient   Turning in Flat Bed Without Bedrails 4   Lying on Back to Sitting on Edge of Flat Bed Without Bedrails 4   Moving Bed to Chair 3   Standing Up From Chair Using Arms 3   Walk in Room 3   Climb 3-5 Stairs With Railing 3   Basic Mobility Inpatient Raw Score 20   Basic Mobility Standardized Score 43.99   Highest Level Of Mobility   JH-HLM Goal 6: Walk 10 steps or more   JH-HLM Achieved 6: Walk 10 steps or more   Pt. Seen for PT treatment s/p IE. PT treatment consisted of TE including: 10x STS, LAQ x 15, Marching x 15, resisted hip abduction x 15, resisted hip adduction x 15, Heel raises x 20. Pt. Seated in recliner s/p session with all needs in reach.     Pilar Valdovinos, PT

## 2023-11-01 NOTE — RESPIRATORY THERAPY NOTE
11/01/23 1939   Respiratory Protocol   Protocol Initiated? No   Protocol Selection Respiratory   Language Barrier? No   Medical & Social History Reviewed? Yes   Diagnostic Studies Reviewed? Yes   Physical Assessment Performed? Yes   Home Devices/Therapy Home O2   Respiratory Plan Home Bronchodilator Patient pathway   Respiratory Assessment   General Appearance Awake; Alert   Respiratory Pattern Normal   Chest Assessment Chest expansion symmetrical   Bilateral Breath Sounds Clear   Cough None   Resp Comments talkative with even non labored respirations   O2 Device 2L o2 nc   Additional Assessments   SpO2 97 %     Continue with scheduled therapy as ordered per home regimen

## 2023-11-01 NOTE — RESPIRATORY THERAPY NOTE
RT Protocol Note  Mitra Caraballo 69 y.o. female MRN: [id number]  Unit/Bed#: -01 Encounter: [id number]    Assessment    Principal Problem:    Diverticulitis  Active Problems:    Hypokalemia    Essential hypertension    Chronic respiratory failure with hypoxia (HCC)    Major depressive disorder, recurrent episode, moderate (HCC)    Anemia    Severe protein-calorie malnutrition (HCC)    BRBPR (bright red blood per rectum)      Home Pulmonary Medications:     23 1415   Respiratory Protocol   Protocol Initiated? No   Protocol Selection Respiratory   Language Barrier? No   Medical & Social History Reviewed? Yes   Diagnostic Studies Reviewed? Yes   Physical Assessment Performed?  Yes   Home Devices/Therapy Home O2   Respiratory Plan Home Bronchodilator Patient pathway   Respiratory Assessment   Resp Comments patient admitted for diverticulitis, hx copd, takes nebs at home, continue home regimen       Home Devices/Therapy: Home O2    Past Medical History:   Diagnosis Date    Acute congestive heart failure ([address]) 2021    Anxiety     Cardiac disease     Chest pain 2021    Chronic pain disorder     COPD (chronic obstructive pulmonary disease) (HCC)     Depression     Heart disease     Hyperlipidemia     Hypertension     MI (myocardial infarction) ([address])     MRSA (methicillin resistant Staphylococcus aureus)     Renal disorder     benign kidney tumor     Social History     Socioeconomic History    Marital status:      Spouse name: None    Number of children: 3    Years of education: 13    Highest education level: High school graduate   Occupational History    Occupation:      Employer: [employer]     Comment: retired    Tobacco Use    Smoking status: Former     Packs/day: 1.00     Years: 60.00     Total pack years: 60.00     Types: Cigarettes     Quit date: 2016     Years since quittin.8    Smokeless tobacco: Never    Tobacco comments:     She has close to a 60 pack-year smoking history, most recently has cut down to 4-5 cigarettes daily   Vaping Use    Vaping Use: Never used   Substance and Sexual Activity    Alcohol use: Never    Drug use: No    Sexual activity: Not Currently   Other Topics Concern    None   Social History Narrative    None     Social Determinants of Health     Financial Resource Strain: Medium Risk (1/3/2020)    Overall Financial Resource Strain (CARDIA)     Difficulty of Paying Living Expenses: Somewhat hard   Food Insecurity: No Food Insecurity (10/30/2023)    Hunger Vital Sign     Worried About Running Out of Food in the Last Year: Never true     Ran Out of Food in the Last Year: Never true   Transportation Needs: No Transportation Needs (10/30/2023)    PRAPARE - Transportation     Lack of Transportation (Medical): No     Lack of Transportation (Non-Medical): No   Physical Activity: Inactive (1/3/2020)    Exercise Vital Sign     Days of Exercise per Week: 0 days     Minutes of Exercise per Session: 0 min   Stress: No Stress Concern Present (1/3/2020)    [facility]     Feeling of Stress :  Only a little   Social Connections: Socially Isolated (1/3/2020)    Social Connection and Isolation Panel [NHANES]     Frequency of Communication with Friends and Family: Twice a week     Frequency of Social Gatherings with Friends and Family: Once a week     Attends [employer] Services: Never     Active Member of Clubs or Organizations: No     Attends Club or Organization Meetings: Never     Marital Status:    Intimate Partner Violence: Not At Risk (1/3/2020)    Humiliation, Afraid, Rape, and Kick questionnaire     Fear of Current or Ex-Partner: No     Emotionally Abused: No     Physically Abused: No     Sexually Abused: No   Housing Stability: Low Risk  (10/30/2023)    Housing Stability Vital Sign     Unable to Pay for Housing in the Last Year: No     Number of [address] in the Last Year: 2 Unstable Housing in the Last Year: No       Subjective         Objective    Physical Exam:   Assessment Type: Pre-treatment  General Appearance: Alert, Awake  Respiratory Pattern: Normal  Chest Assessment: Chest expansion symmetrical  Bilateral Breath Sounds: Diminished    Vitals:  Blood pressure 118/63, pulse 73, temperature 97.6 °F (36.4 °C), temperature source Oral, resp. rate 20, height 5' 8" (1.727 m), weight 55.2 kg (121 lb 11.1 oz), SpO2 95 %, not currently breastfeeding. Imaging and other studies: I have personally reviewed pertinent reports.       O2 Device: NC     Plan    Respiratory Plan: Home Bronchodilator Patient pathway        Resp Comments: patient admitted for diverticulitis, hx copd, takes nebs at home, continue home regimen

## 2023-11-01 NOTE — PLAN OF CARE
Problem: PHYSICAL THERAPY ADULT  Goal: Performs mobility at highest level of function for planned discharge setting. See evaluation for individualized goals. Description: Treatment/Interventions: Functional transfer training, LE strengthening/ROM, Therapeutic exercise, Endurance training, Bed mobility, Equipment eval/education, Gait training, Patient/family training, Spoke to nursing          See flowsheet documentation for full assessment, interventions and recommendations. Outcome: Progressing  Note: Prognosis: Good  Problem List: Decreased strength, Decreased endurance, Impaired balance, Decreased mobility, Decreased safety awareness  Assessment: Pt is 69 y.o. female seen for PT evaluation s/p admit to [id number] [facility] on 10/28/2023 w/ Diverticulitis. PT consulted to assess pt's functional mobility and d/c needs. Order placed for PT eval and tx, w/  activity  order. Comorbidities affecting pt's physical performance at time of assessment include: HTN, chronic respiratory failure, Major depressive order, anemia, diverticulitis, ambulatory dysfunction. PTA, pt was independent w/ all functional mobility w/ RW . Personal factors affecting pt at time of IE include: lives in one story house, ambulating w/ assistive device, stairs to enter home, limited home support, and inability to perform IADLs. Please find objective findings from PT assessment regarding body systems outlined above with impairments and limitations including weakness, impaired balance, decreased endurance, gait deviations, pain, decreased functional mobility tolerance, fall risk, and SOB upon exertion. The following objective measures performed on IE also reveal limitations: AM-PAC 6-Clicks: 27/78. Pt's clinical presentation is currently unstable/unpredictable seen in pt's presentation. Pt to benefit from continued PT tx to address deficits as defined above and maximize level of functional independent mobility and consistency.  From PT/mobility lmom that refill can not be done until Friday  standpoint, recommendation at time of d/c would be Moderate resource intensity pending progress in order to facilitate return to PLOF. Barriers to Discharge: Decreased caregiver support, Other (Comment) (decline inf unctional baseline)     Rehab Resource Intensity Level, PT: II (Moderate Resource Intensity)    See flowsheet documentation for full assessment.

## 2023-11-01 NOTE — PLAN OF CARE
Problem: MOBILITY - ADULT  Goal: Maintain or return to baseline ADL function  Description: INTERVENTIONS:  -  Assess patient's ability to carry out ADLs; assess patient's baseline for ADL function and identify physical deficits which impact ability to perform ADLs (bathing, care of mouth/teeth, toileting, grooming, dressing, etc.)  - Assess/evaluate cause of self-care deficits   - Assess range of motion  - Assess patient's mobility; develop plan if impaired  - Assess patient's need for assistive devices and provide as appropriate  - Encourage maximum independence but intervene and supervise when necessary  - Involve family in performance of ADLs  - Assess for home care needs following discharge   - Consider OT consult to assist with ADL evaluation and planning for discharge  - Provide patient education as appropriate  Outcome: Progressing  Goal: Maintains/Returns to pre admission functional level  Description: INTERVENTIONS:  - Perform BMAT or MOVE assessment daily.   - Set and communicate daily mobility goal to care team and patient/family/caregiver. - Collaborate with rehabilitation services on mobility goals if consulted  - Perform Range of Motion 4 times a day. - Reposition patient every 2 hours.   - Dangle patient 3 times a day  - Stand patient 3 times a day  - Ambulate patient 3 times a day  - Out of bed to chair 3 times a day   - Out of bed for meals 3 times a day  - Out of bed for toileting  - Record patient progress and toleration of activity level   Outcome: Progressing     Problem: DISCHARGE PLANNING  Goal: Discharge to home or other facility with appropriate resources  Description: INTERVENTIONS:  - Identify barriers to discharge w/patient and caregiver  - Arrange for needed discharge resources and transportation as appropriate  - Identify discharge learning needs (meds, wound care, etc.)  - Arrange for interpretive services to assist at discharge as needed  - Refer to Case Management Department for coordinating discharge planning if the patient needs post-hospital services based on physician/advanced practitioner order or complex needs related to functional status, cognitive ability, or social support system  Outcome: Progressing     Problem: Knowledge Deficit  Goal: Patient/family/caregiver demonstrates understanding of disease process, treatment plan, medications, and discharge instructions  Description: Complete learning assessment and assess knowledge base.   Interventions:  - Provide teaching at level of understanding  - Provide teaching via preferred learning methods  Outcome: Progressing     Problem: Prexisting or High Potential for Compromised Skin Integrity  Goal: Skin integrity is maintained or improved  Description: INTERVENTIONS:  - Identify patients at risk for skin breakdown  - Assess and monitor skin integrity  - Assess and monitor nutrition and hydration status  - Monitor labs   - Assess for incontinence   - Turn and reposition patient  - Assist with mobility/ambulation  - Relieve pressure over bony prominences  - Avoid friction and shearing  - Provide appropriate hygiene as needed including keeping skin clean and dry  - Evaluate need for skin moisturizer/barrier cream  - Collaborate with interdisciplinary team   - Patient/family teaching  - Consider wound care consult   Outcome: Progressing     Problem: GASTROINTESTINAL - ADULT  Goal: Maintains or returns to baseline bowel function  Description: INTERVENTIONS:  - Assess bowel function  - Encourage oral fluids to ensure adequate hydration  - Administer IV fluids if ordered to ensure adequate hydration  - Administer ordered medications as needed  - Encourage mobilization and activity  - Consider nutritional services referral to assist patient with adequate nutrition and appropriate food choices  Outcome: Progressing  Goal: Maintains adequate nutritional intake  Description: INTERVENTIONS:  - Monitor percentage of each meal consumed  - Identify factors contributing to decreased intake, treat as appropriate  - Assist with meals as needed  - Monitor I&O, weight, and lab values if indicated  - Obtain nutrition services referral as needed  Outcome: Progressing

## 2023-11-02 ENCOUNTER — TELEPHONE (OUTPATIENT)
Dept: GASTROENTEROLOGY | Facility: CLINIC | Age: 78
End: 2023-11-02

## 2023-11-02 LAB
ABO GROUP BLD BPU: NORMAL
ABO GROUP BLD BPU: NORMAL
ABO GROUP BLD: NORMAL
BLD GP AB SCN SERPL QL: NEGATIVE
BPU ID: NORMAL
BPU ID: NORMAL
CROSSMATCH: NORMAL
CROSSMATCH: NORMAL
ERYTHROCYTE [DISTWIDTH] IN BLOOD BY AUTOMATED COUNT: 17.2 % (ref 11.6–15.1)
HCT VFR BLD AUTO: 20.7 % (ref 34.8–46.1)
HGB BLD-MCNC: 6.4 G/DL (ref 11.5–15.4)
MCH RBC QN AUTO: 30.6 PG (ref 26.8–34.3)
MCHC RBC AUTO-ENTMCNC: 30.9 G/DL (ref 31.4–37.4)
MCV RBC AUTO: 99 FL (ref 82–98)
PLATELET # BLD AUTO: 153 THOUSANDS/UL (ref 149–390)
PMV BLD AUTO: 9.4 FL (ref 8.9–12.7)
RBC # BLD AUTO: 2.09 MILLION/UL (ref 3.81–5.12)
RH BLD: NEGATIVE
SPECIMEN EXPIRATION DATE: NORMAL
UNIT DISPENSE STATUS: NORMAL
UNIT DISPENSE STATUS: NORMAL
UNIT PRODUCT CODE: NORMAL
UNIT PRODUCT CODE: NORMAL
UNIT PRODUCT VOLUME: 350 ML
UNIT PRODUCT VOLUME: 350 ML
UNIT RH: NORMAL
UNIT RH: NORMAL
WBC # BLD AUTO: 6.62 THOUSAND/UL (ref 4.31–10.16)

## 2023-11-02 PROCEDURE — 94760 N-INVAS EAR/PLS OXIMETRY 1: CPT

## 2023-11-02 PROCEDURE — 94664 DEMO&/EVAL PT USE INHALER: CPT

## 2023-11-02 PROCEDURE — 86923 COMPATIBILITY TEST ELECTRIC: CPT

## 2023-11-02 PROCEDURE — 94640 AIRWAY INHALATION TREATMENT: CPT

## 2023-11-02 PROCEDURE — 85027 COMPLETE CBC AUTOMATED: CPT | Performed by: STUDENT IN AN ORGANIZED HEALTH CARE EDUCATION/TRAINING PROGRAM

## 2023-11-02 PROCEDURE — 86850 RBC ANTIBODY SCREEN: CPT | Performed by: STUDENT IN AN ORGANIZED HEALTH CARE EDUCATION/TRAINING PROGRAM

## 2023-11-02 PROCEDURE — 86900 BLOOD TYPING SEROLOGIC ABO: CPT | Performed by: STUDENT IN AN ORGANIZED HEALTH CARE EDUCATION/TRAINING PROGRAM

## 2023-11-02 PROCEDURE — 88305 TISSUE EXAM BY PATHOLOGIST: CPT | Performed by: SPECIALIST

## 2023-11-02 PROCEDURE — 86901 BLOOD TYPING SEROLOGIC RH(D): CPT | Performed by: STUDENT IN AN ORGANIZED HEALTH CARE EDUCATION/TRAINING PROGRAM

## 2023-11-02 PROCEDURE — P9016 RBC LEUKOCYTES REDUCED: HCPCS

## 2023-11-02 PROCEDURE — 99232 SBSQ HOSP IP/OBS MODERATE 35: CPT | Performed by: STUDENT IN AN ORGANIZED HEALTH CARE EDUCATION/TRAINING PROGRAM

## 2023-11-02 PROCEDURE — C9113 INJ PANTOPRAZOLE SODIUM, VIA: HCPCS | Performed by: INTERNAL MEDICINE

## 2023-11-02 RX ORDER — ACETAMINOPHEN 325 MG/1
650 TABLET ORAL EVERY 6 HOURS PRN
Status: DISCONTINUED | OUTPATIENT
Start: 2023-11-02 | End: 2023-11-03 | Stop reason: HOSPADM

## 2023-11-02 RX ADMIN — PANTOPRAZOLE SODIUM 40 MG: 40 INJECTION, POWDER, FOR SOLUTION INTRAVENOUS at 02:59

## 2023-11-02 RX ADMIN — MELATONIN 6 MG: at 21:44

## 2023-11-02 RX ADMIN — ZANUBRUTINIB 160 MG: 80 CAPSULE, GELATIN COATED ORAL at 18:05

## 2023-11-02 RX ADMIN — ACETAMINOPHEN 650 MG: 325 TABLET, FILM COATED ORAL at 03:09

## 2023-11-02 RX ADMIN — BUDESONIDE AND FORMOTEROL FUMARATE DIHYDRATE 2 PUFF: 160; 4.5 AEROSOL RESPIRATORY (INHALATION) at 18:06

## 2023-11-02 RX ADMIN — VILAZODONE HYDROCHLORIDE 40 MG: 20 TABLET ORAL at 08:16

## 2023-11-02 RX ADMIN — IPRATROPIUM BROMIDE 0.5 MG: 0.5 SOLUTION RESPIRATORY (INHALATION) at 07:24

## 2023-11-02 RX ADMIN — DIPHENHYDRAMINE HYDROCHLORIDE 25 MG: 25 TABLET ORAL at 21:44

## 2023-11-02 RX ADMIN — CARVEDILOL 25 MG: 12.5 TABLET, FILM COATED ORAL at 08:16

## 2023-11-02 RX ADMIN — CARVEDILOL 25 MG: 12.5 TABLET, FILM COATED ORAL at 16:54

## 2023-11-02 RX ADMIN — BUPROPION HYDROCHLORIDE 75 MG: 75 TABLET, FILM COATED ORAL at 08:16

## 2023-11-02 RX ADMIN — IPRATROPIUM BROMIDE 0.5 MG: 0.5 SOLUTION RESPIRATORY (INHALATION) at 13:49

## 2023-11-02 RX ADMIN — PANTOPRAZOLE SODIUM 40 MG: 40 INJECTION, POWDER, FOR SOLUTION INTRAVENOUS at 15:06

## 2023-11-02 RX ADMIN — IPRATROPIUM BROMIDE 0.5 MG: 0.5 SOLUTION RESPIRATORY (INHALATION) at 20:36

## 2023-11-02 RX ADMIN — ZANUBRUTINIB 160 MG: 80 CAPSULE, GELATIN COATED ORAL at 08:15

## 2023-11-02 RX ADMIN — BUDESONIDE AND FORMOTEROL FUMARATE DIHYDRATE 2 PUFF: 160; 4.5 AEROSOL RESPIRATORY (INHALATION) at 08:16

## 2023-11-02 NOTE — TELEPHONE ENCOUNTER
Called patient 2x. Number justs rings " busy " no other # listed on patients file. Will try again later. ..

## 2023-11-02 NOTE — NURSING NOTE
Patient hemoglobin found to be critical at 6.4 at 1158. Dr. Deirdre Coleman notified at [phone]. Orders for 1 unit RBC ordered at 1426 as well as type and screen. Type and screen is in process and report given to oncoming nurse who will be requesting the blood once the type & screen results.

## 2023-11-02 NOTE — ASSESSMENT & PLAN NOTE
Patient presented with bright red blood per rectum for about 4 days, denies any nausea vomiting abdominal pain but does have leukocytosis. .  CT abdomen showed likely diverticulitis   EGD noted with gastritis, bulb angiectasia status post APC treatment with  Colonoscopy noted with left-sided diverticulosis with blood clot. Diverticulitis has been ruled out. Discontinue IV Zosyn. Continue PPI,  Hgb 6.4  Will give 1 U prbc transfusion  Cleared by GI for discharge with outpatient capsule endoscopy. Currently plan for discharge tomorrow if hemoglobin remain stable.

## 2023-11-02 NOTE — ASSESSMENT & PLAN NOTE
Diverticular bleed based on colonoscopy report. Patient does report having some dark stool however hemoglobin stable. Hemoglobin currently 6.4  Ordered 1 u PRBC transfusion. Trend hemoglobin level.

## 2023-11-02 NOTE — ASSESSMENT & PLAN NOTE
Secondary to diverticular bleed. Current hemoglobin stable around 6.5  Ordered 1 unit PRBC transfusion. Trend hemoglobin level.

## 2023-11-02 NOTE — RESPIRATORY THERAPY NOTE
RT Protocol Note  Colonel Pierce 69 y.o. female MRN: [id number]  Unit/Bed#: -01 Encounter: [id number]    Assessment    Principal Problem:    Diverticulitis  Active Problems:    Hypokalemia    Essential hypertension    Chronic respiratory failure with hypoxia (HCC)    Major depressive disorder, recurrent episode, moderate (HCC)    Anemia    Severe protein-calorie malnutrition (HCC)    BRBPR (bright red blood per rectum)      Home Pulmonary Medications:  Inhaler/neb    Home Devices/Therapy: Home O2    Past Medical History:   Diagnosis Date    Acute congestive heart failure ([address]) 2021    Anxiety     Cardiac disease     Chest pain 2021    Chronic pain disorder     COPD (chronic obstructive pulmonary disease) (HCC)     Depression     Heart disease     Hyperlipidemia     Hypertension     MI (myocardial infarction) ([address])     MRSA (methicillin resistant Staphylococcus aureus)     Renal disorder     benign kidney tumor     Social History     Socioeconomic History    Marital status:      Spouse name: None    Number of children: 3    Years of education: 15    Highest education level: High school graduate   Occupational History    Occupation:      Employer: [employer]     Comment: retired    Tobacco Use    Smoking status: Former     Packs/day: 1.00     Years: 60.00     Total pack years: 60.00     Types: Cigarettes     Quit date: 2016     Years since quittin.8    Smokeless tobacco: Never    Tobacco comments:     She has close to a 60 pack-year smoking history, most recently has cut down to 4-5 cigarettes daily   Vaping Use    Vaping Use: Never used   Substance and Sexual Activity    Alcohol use: Never    Drug use: No    Sexual activity: Not Currently   Other Topics Concern    None   Social History Narrative    None     Social Determinants of Health     Financial Resource Strain: Medium Risk (1/3/2020)    Overall Financial Resource Strain (CARDIA)     Difficulty of Paying Living Expenses: Somewhat hard   Food Insecurity: No Food Insecurity (10/30/2023)    Hunger Vital Sign     Worried About Running Out of Food in the Last Year: Never true     Ran Out of Food in the Last Year: Never true   Transportation Needs: No Transportation Needs (10/30/2023)    PRAPARE - Transportation     Lack of Transportation (Medical): No     Lack of Transportation (Non-Medical): No   Physical Activity: Inactive (1/3/2020)    Exercise Vital Sign     Days of Exercise per Week: 0 days     Minutes of Exercise per Session: 0 min   Stress: No Stress Concern Present (1/3/2020)    [address] [facility]     Feeling of Stress : Only a little   Social Connections: Socially Isolated (1/3/2020)    Social Connection and Isolation Panel [NHANES]     Frequency of Communication with Friends and Family: Twice a week     Frequency of Social Gatherings with Friends and Family: Once a week     Attends Congregational Services: Never     Active Member of Clubs or Organizations: No     Attends Club or Organization Meetings: Never     Marital Status:    Intimate Partner Violence: Not At Risk (1/3/2020)    Humiliation, Afraid, Rape, and Kick questionnaire     Fear of Current or Ex-Partner: No     Emotionally Abused: No     Physically Abused: No     Sexually Abused: No   Housing Stability: Low Risk  (10/30/2023)    Housing Stability Vital Sign     Unable to Pay for Housing in the Last Year: No     Number of Places Lived in the Last Year: 2     Unstable Housing in the Last Year: No       Subjective         Objective    Physical Exam:   Assessment Type: During-treatment  General Appearance: Awake, Alert  Respiratory Pattern: Normal  Chest Assessment: Chest expansion symmetrical  Bilateral Breath Sounds: Clear, Diminished  Cough: None  O2 Device: NC    Vitals:  Blood pressure 133/67, pulse 99, temperature 98 °F (36.7 °C), resp.  rate 16, height 5' 8" (1.727 m), weight 55.2 kg (121 lb 11.1 oz), SpO2 99 %, not currently breastfeeding. Imaging and other studies: I have personally reviewed pertinent reports. O2 Device: NC     Plan    Respiratory Plan: Home Bronchodilator Patient pathway        Resp Comments: Pt sitting in bed in no apparent distress. Offers no complaints. Will cont w/ edith tx.

## 2023-11-02 NOTE — ASSESSMENT & PLAN NOTE
Patient is home oxygen dependent 4 L at baseline. Currently O2 saturation 99% on 2 L nasal cannula. Former smoker.

## 2023-11-02 NOTE — PLAN OF CARE
Problem: MOBILITY - ADULT  Goal: Maintain or return to baseline ADL function  Description: INTERVENTIONS:  -  Assess patient's ability to carry out ADLs; assess patient's baseline for ADL function and identify physical deficits which impact ability to perform ADLs (bathing, care of mouth/teeth, toileting, grooming, dressing, etc.)  - Assess/evaluate cause of self-care deficits   - Assess range of motion  - Assess patient's mobility; develop plan if impaired  - Assess patient's need for assistive devices and provide as appropriate  - Encourage maximum independence but intervene and supervise when necessary  - Involve family in performance of ADLs  - Assess for home care needs following discharge   - Consider OT consult to assist with ADL evaluation and planning for discharge  - Provide patient education as appropriate  Outcome: Progressing  Goal: Maintains/Returns to pre admission functional level  Description: INTERVENTIONS:  - Perform BMAT or MOVE assessment daily.   - Set and communicate daily mobility goal to care team and patient/family/caregiver.    - Collaborate with rehabilitation services on mobility goals if consulted  - Out of bed for toileting  - Record patient progress and toleration of activity level   Outcome: Progressing     Problem: DISCHARGE PLANNING  Goal: Discharge to home or other facility with appropriate resources  Description: INTERVENTIONS:  - Identify barriers to discharge w/patient and caregiver  - Arrange for needed discharge resources and transportation as appropriate  - Identify discharge learning needs (meds, wound care, etc.)  - Arrange for interpretive services to assist at discharge as needed  - Refer to Case Management Department for coordinating discharge planning if the patient needs post-hospital services based on physician/advanced practitioner order or complex needs related to functional status, cognitive ability, or social support system  Outcome: Progressing     Problem: Knowledge Deficit  Goal: Patient/family/caregiver demonstrates understanding of disease process, treatment plan, medications, and discharge instructions  Description: Complete learning assessment and assess knowledge base.   Interventions:  - Provide teaching at level of understanding  - Provide teaching via preferred learning methods  Outcome: Progressing     Problem: Prexisting or High Potential for Compromised Skin Integrity  Goal: Skin integrity is maintained or improved  Description: INTERVENTIONS:  - Identify patients at risk for skin breakdown  - Assess and monitor skin integrity  - Assess and monitor nutrition and hydration status  - Monitor labs   - Assess for incontinence   - Turn and reposition patient  - Assist with mobility/ambulation  - Relieve pressure over bony prominences  - Avoid friction and shearing  - Provide appropriate hygiene as needed including keeping skin clean and dry  - Evaluate need for skin moisturizer/barrier cream  - Collaborate with interdisciplinary team   - Patient/family teaching  - Consider wound care consult   Outcome: Progressing     Problem: GASTROINTESTINAL - ADULT  Goal: Maintains or returns to baseline bowel function  Description: INTERVENTIONS:  - Assess bowel function  - Encourage oral fluids to ensure adequate hydration  - Administer IV fluids if ordered to ensure adequate hydration  - Administer ordered medications as needed  - Encourage mobilization and activity  - Consider nutritional services referral to assist patient with adequate nutrition and appropriate food choices  Outcome: Progressing  Goal: Maintains adequate nutritional intake  Description: INTERVENTIONS:  - Monitor percentage of each meal consumed  - Identify factors contributing to decreased intake, treat as appropriate  - Assist with meals as needed  - Monitor I&O, weight, and lab values if indicated  - Obtain nutrition services referral as needed  Outcome: Progressing

## 2023-11-02 NOTE — PROGRESS NOTES
[address]  Progress Note  Name: Anuel Swenson  MRN: [id number]  Unit/Bed#: -01 I Date of Admission: 10/28/2023   Date of Service: 11/2/2023 I Hospital Day: 4    Assessment/Plan   * Diverticulitis  Assessment & Plan  Patient presented with bright red blood per rectum for about 4 days, denies any nausea vomiting abdominal pain but does have leukocytosis. .  CT abdomen showed likely diverticulitis   EGD noted with gastritis, bulb angiectasia status post APC treatment with  Colonoscopy noted with left-sided diverticulosis with blood clot. Diverticulitis has been ruled out. Discontinue IV Zosyn. Continue PPI,  Hgb 6.4  Will give 1 U prbc transfusion  Cleared by GI for discharge with outpatient capsule endoscopy. Currently plan for discharge tomorrow if hemoglobin remain stable. BRBPR (bright red blood per rectum)  Assessment & Plan  Diverticular bleed based on colonoscopy report. Patient does report having some dark stool however hemoglobin stable. Hemoglobin currently 6.4  Ordered 1 u PRBC transfusion. Trend hemoglobin level. Severe protein-calorie malnutrition ([address])  Assessment & Plan  Malnutrition Findings:                        High-protein diet advised         BMI Findings: Body mass index is 18.5 kg/m². Anemia  Assessment & Plan  Secondary to diverticular bleed. Current hemoglobin stable around 6.5  Ordered 1 unit PRBC transfusion. Trend hemoglobin level. Major depressive disorder, recurrent episode, moderate (HCC)  Assessment & Plan  Continue bupropion    Chronic respiratory failure with hypoxia [facility])  Assessment & Plan  Patient is home oxygen dependent 4 L at baseline. Currently O2 saturation 99% on 2 L nasal cannula. Former smoker. Essential hypertension  Assessment & Plan  Continue carvedilol with holding parameters    Hypokalemia  Assessment & Plan  Resolved with repeat BMP later on in the afternoon.   Currently K  3.5 VTE Pharmacologic Prophylaxis:   Moderate Risk (Score 3-4) - Pharmacological DVT Prophylaxis Contraindicated. Sequential Compression Devices Ordered. Patient Centered Rounds: I performed bedside rounds with nursing staff today. Current Length of Stay: 4 day(s)  Current Patient Status: Inpatient   Certification Statement: The patient will continue to require additional inpatient hospital stay due to anemia with hemoglobin 6.4. Discharge Plan: Anticipate discharge tomorrow to home. with VNA. Pt declined to go to Rehab. Code Status: Level 1 - Full Code    Subjective:   Hemoglobin down to 6.4. Patient does report having episode of dark bowel movement as well as bright red blood yesterday evening. Denies any other new complaints. No other events reported. Objective:     Vitals:   Temp (24hrs), Av °F (36.7 °C), Min:97.9 °F (36.6 °C), Max:98.1 °F (36.7 °C)    Temp:  [97.9 °F (36.6 °C)-98.1 °F (36.7 °C)] 98 °F (36.7 °C)  HR:  [] 99  Resp:  [16] 16  BP: (131-144)/(66-77) 133/67  SpO2:  [93 %-99 %] 98 %  Body mass index is 18.5 kg/m². Input and Output Summary (last 24 hours):   No intake or output data in the 24 hours ending 23 1427    Physical Exam:   Physical Exam  Constitutional:       General: She is not in acute distress. Appearance: Normal appearance. She is not ill-appearing, toxic-appearing or diaphoretic. HENT:      Head: Normocephalic and atraumatic. Eyes:      Pupils: Pupils are equal, round, and reactive to light. Cardiovascular:      Rate and Rhythm: Normal rate. Pulses: Normal pulses. Pulmonary:      Effort: Pulmonary effort is normal. No respiratory distress. Breath sounds: Normal breath sounds. No wheezing. Comments: O2 sat 99% on 2L NC. Abdominal:      General: Bowel sounds are normal. There is no distension. Palpations: Abdomen is soft. Tenderness: There is no abdominal tenderness.    Musculoskeletal:      Cervical back: No rigidity. Right lower leg: No edema. Left lower leg: No edema. Neurological:      Mental Status: She is alert and oriented to person, place, and time. Mental status is at baseline. Psychiatric:         Mood and Affect: Mood normal.         Behavior: Behavior normal.          Additional Data:     Labs:  Results from last 7 days   Lab Units 11/02/23  1133 11/01/23 2025 11/01/23  1009   WBC Thousand/uL 6.62   < > 6.18   HEMOGLOBIN g/dL 6.4*   < > 6.9*   HEMATOCRIT % 20.7*   < > 22.0*   PLATELETS Thousands/uL 153   < > 153   NEUTROS PCT %  --   --  34*   LYMPHS PCT %  --   --  49*   MONOS PCT %  --   --  8   EOS PCT %  --   --  7*    < > = values in this interval not displayed. Results from last 7 days   Lab Units 10/31/23  1219 10/29/23  0719 10/28/23  2121   SODIUM mmol/L 142   < > 142   POTASSIUM mmol/L 3.5   < > 3.7   CHLORIDE mmol/L 107   < > 101   CO2 mmol/L 32   < > 38*   BUN mg/dL 11   < > 33*   CREATININE mg/dL 0.92   < > 1.07   ANION GAP mmol/L 3   < > 3   CALCIUM mg/dL 8.4   < > 8.9   ALBUMIN g/dL  --   --  3.6   TOTAL BILIRUBIN mg/dL  --   --  0.20   ALK PHOS U/L  --   --  59   ALT U/L  --   --  3*   AST U/L  --   --  9*   GLUCOSE RANDOM mg/dL 100   < > 113    < > = values in this interval not displayed.      Results from last 7 days   Lab Units 10/28/23  2121   INR  1.01                   Lines/Drains:  Invasive Devices       Peripheral Intravenous Line  Duration             Peripheral IV 10/30/23 Right;Dorsal (posterior) Forearm 2 days    Peripheral IV 10/31/23 Left;Ventral (anterior) Forearm 2 days                      Recent Cultures (last 7 days):         Last 24 Hours Medication List:   Current Facility-Administered Medications   Medication Dose Route Frequency Provider Last Rate    acetaminophen  650 mg Oral Q6H PRN Kerry Chadwick PA-C      albuterol  2 puff Inhalation Q6H PRN Richard Genao MD      budesonide-formoterol  2 puff Inhalation BID Richard Genao MD buPROPion  75 mg Oral Daily Yelena Gomez III, MD      carvedilol  25 mg Oral BID With Meals Yelena Gomez III, MD      diphenhydrAMINE  25 mg Oral HS PRN Yelena Gomez III, MD      ipratropium  0.5 mg Nebulization TID Bhakti Lui MD      melatonin  6 mg Oral HS Gabby Cardenas MD      nicotine  1 patch Transdermal Daily Yelena Gomez III, MD      pantoprazole  40 mg Intravenous Q12H Bhakti Lui MD      vilazodone  40 mg Oral Daily With Breakfast Bhakti Lui MD      Zanubrutinib  160 mg Oral BID Bhakti Lui MD          Today, Patient Was Seen By: Gabby Cardenas MD    **Please Note: This note may have been constructed using a voice recognition system. **

## 2023-11-03 ENCOUNTER — TELEPHONE (OUTPATIENT)
Dept: GASTROENTEROLOGY | Facility: CLINIC | Age: 78
End: 2023-11-03

## 2023-11-03 VITALS
SYSTOLIC BLOOD PRESSURE: 152 MMHG | OXYGEN SATURATION: 99 % | DIASTOLIC BLOOD PRESSURE: 79 MMHG | HEART RATE: 96 BPM | WEIGHT: 121.69 LBS | BODY MASS INDEX: 18.44 KG/M2 | RESPIRATION RATE: 18 BRPM | TEMPERATURE: 98 F | HEIGHT: 68 IN

## 2023-11-03 LAB
ABO GROUP BLD BPU: NORMAL
BPU ID: NORMAL
CROSSMATCH: NORMAL
ERYTHROCYTE [DISTWIDTH] IN BLOOD BY AUTOMATED COUNT: 17 % (ref 11.6–15.1)
HCT VFR BLD AUTO: 23 % (ref 34.8–46.1)
HGB BLD-MCNC: 7.5 G/DL (ref 11.5–15.4)
MCH RBC QN AUTO: 31.9 PG (ref 26.8–34.3)
MCHC RBC AUTO-ENTMCNC: 32.6 G/DL (ref 31.4–37.4)
MCV RBC AUTO: 98 FL (ref 82–98)
PLATELET # BLD AUTO: 149 THOUSANDS/UL (ref 149–390)
PMV BLD AUTO: 10 FL (ref 8.9–12.7)
RBC # BLD AUTO: 2.35 MILLION/UL (ref 3.81–5.12)
UNIT DISPENSE STATUS: NORMAL
UNIT PRODUCT CODE: NORMAL
UNIT PRODUCT VOLUME: 350 ML
UNIT RH: NORMAL
WBC # BLD AUTO: 6.99 THOUSAND/UL (ref 4.31–10.16)

## 2023-11-03 PROCEDURE — 99239 HOSP IP/OBS DSCHRG MGMT >30: CPT | Performed by: STUDENT IN AN ORGANIZED HEALTH CARE EDUCATION/TRAINING PROGRAM

## 2023-11-03 PROCEDURE — 94760 N-INVAS EAR/PLS OXIMETRY 1: CPT

## 2023-11-03 PROCEDURE — C9113 INJ PANTOPRAZOLE SODIUM, VIA: HCPCS | Performed by: INTERNAL MEDICINE

## 2023-11-03 PROCEDURE — 94640 AIRWAY INHALATION TREATMENT: CPT

## 2023-11-03 PROCEDURE — 85027 COMPLETE CBC AUTOMATED: CPT | Performed by: STUDENT IN AN ORGANIZED HEALTH CARE EDUCATION/TRAINING PROGRAM

## 2023-11-03 RX ORDER — PANTOPRAZOLE SODIUM 40 MG/1
40 TABLET, DELAYED RELEASE ORAL DAILY
Qty: 30 TABLET | Refills: 0 | Status: SHIPPED | OUTPATIENT
Start: 2023-11-03

## 2023-11-03 RX ORDER — DOCUSATE SODIUM 100 MG/1
100 CAPSULE, LIQUID FILLED ORAL 2 TIMES DAILY PRN
Qty: 60 CAPSULE | Refills: 0 | Status: SHIPPED | OUTPATIENT
Start: 2023-11-03

## 2023-11-03 RX ORDER — FERROUS SULFATE 325(65) MG
325 TABLET ORAL 2 TIMES DAILY WITH MEALS
Qty: 60 TABLET | Refills: 0 | Status: SHIPPED | OUTPATIENT
Start: 2023-11-03

## 2023-11-03 RX ADMIN — ALBUTEROL SULFATE 2 PUFF: 90 AEROSOL, METERED RESPIRATORY (INHALATION) at 09:33

## 2023-11-03 RX ADMIN — PANTOPRAZOLE SODIUM 40 MG: 40 INJECTION, POWDER, FOR SOLUTION INTRAVENOUS at 13:35

## 2023-11-03 RX ADMIN — BUPROPION HYDROCHLORIDE 75 MG: 75 TABLET, FILM COATED ORAL at 08:57

## 2023-11-03 RX ADMIN — PANTOPRAZOLE SODIUM 40 MG: 40 INJECTION, POWDER, FOR SOLUTION INTRAVENOUS at 01:54

## 2023-11-03 RX ADMIN — CARVEDILOL 25 MG: 12.5 TABLET, FILM COATED ORAL at 08:57

## 2023-11-03 RX ADMIN — ALBUTEROL SULFATE 2 PUFF: 90 AEROSOL, METERED RESPIRATORY (INHALATION) at 09:32

## 2023-11-03 RX ADMIN — IPRATROPIUM BROMIDE 0.5 MG: 0.5 SOLUTION RESPIRATORY (INHALATION) at 13:23

## 2023-11-03 RX ADMIN — ZANUBRUTINIB 160 MG: 80 CAPSULE, GELATIN COATED ORAL at 09:31

## 2023-11-03 RX ADMIN — BUDESONIDE AND FORMOTEROL FUMARATE DIHYDRATE 2 PUFF: 160; 4.5 AEROSOL RESPIRATORY (INHALATION) at 10:50

## 2023-11-03 RX ADMIN — VILAZODONE HYDROCHLORIDE 40 MG: 20 TABLET ORAL at 09:31

## 2023-11-03 RX ADMIN — IPRATROPIUM BROMIDE 0.5 MG: 0.5 SOLUTION RESPIRATORY (INHALATION) at 07:39

## 2023-11-03 NOTE — DISCHARGE SUMMARY
[address]  Discharge- Dodie España 1945, 69 y.o. female MRN: [id number]  Unit/Bed#: -[facility] Encounter: [id number]  Primary Care Provider: Magdaleno Mohr MD   Date and time admitted to hospital: 10/28/2023  8:09 PM    * Diverticulitis  Assessment & Plan  Patient presented with bright red blood per rectum for about 4 days, denies any nausea vomiting abdominal pain but does have leukocytosis. .  CT abdomen showed likely diverticulitis   EGD noted with gastritis, bulb angiectasia status post APC treatment with  Colonoscopy noted with left-sided diverticulosis with blood clot indicating diverticular bleed. Diverticulitis has been ruled out. Discontinue IV Zosyn and patient admissible of antibiotics. Current hemoglobin 7.5 after 2 units of previous interventions since admission. Cleared by GI for discharge with outpatient capsule endoscopy. Patient declined to go to rehab and requesting to go home with VNA services. Recommended repeat CBC within 1 week with primary care. Continue Protonix. Patient is agreeable with outpatient follow-up plan. BRBPR (bright red blood per rectum)  Assessment & Plan  Diverticular bleed based on colonoscopy report. Currently patient denies having any further episodes of dark stool or blood per rectum. Hemoglobin currently 7.5 units of 2 PRBC transfusion since admission. Severe protein-calorie malnutrition ([address])  Assessment & Plan  Malnutrition Findings:                        High-protein diet advised         BMI Findings: Body mass index is 18.5 kg/m². Anemia  Assessment & Plan  Secondary to diverticular bleed. Current hemoglobin stable around 7.5 post 2 unit PRBC transfusion since admission. Recommended repeat CBC in 1 week with primary care provider. Recommend to continue iron supplement. Recommend outpatient follow-up with GI for pill capsule endoscopy.         Major depressive disorder, recurrent episode, moderate [facility])  Assessment & Plan  Continue bupropion    Chronic respiratory failure with hypoxia [facility])  Assessment & Plan  Patient is home oxygen dependent 4 L at baseline. Currently O2 saturation 99% on 2 L nasal cannula. Former smoker. Essential hypertension  Assessment & Plan  Resume Coreg on discharge. Continue patient follow-up. Hypokalemia  Assessment & Plan  Resolved with repeat BMP later on in the afternoon. Currently K  3.5      Discharging Physician / Practitioner: Josué Del Cid MD  PCP: Surjit Zimmer MD  Admission Date:   Admission Orders (From admission, onward)       Ordered        10/29/23 0027  INPATIENT ADMISSION  Once                          Discharge Date: 11/03/23    Medical Problems       Resolved Problems  Date Reviewed: 11/2/2023   None         Consultations During Hospital Stay:  Gastroenterology. Procedures Performed:   EGD noted with mild erosive gastritis, duodenal bulb angiectasia status post APC. Colonoscopy noted with left-sided diverticulosis with old blood clots       Outpatient Tests Requested:  Recommended repeat CBC in 1 week with PCP. Recommend outpatient follow-up with GI for capsule endoscopy. Reason for Admission: Hematochezia. Likely secondary to diverticular bleed. Hospital Course:     Perez Sethi is a 69 y.o. female patient with past medical history of COPD, chronic hypoxic respiratory failure chronically on home oxygen 3 to 4 L at baseline, history of CAD, depression, hypertension, chronic anemia, who originally presented to the hospital on 10/28/2023 due to hematochezia. Patient reported rectal bleeding has been ongoing for 4 days prior to presenting to the emergency room. Reported blood red blood per rectum, denies any other associate symptoms. CT abdomen pelvis report noted with finding of colonic diverticulosis as well as possible finding of diverticulitis and patient was empirically treated with IV Zosyn. Presented with hemoglobin of 7.0. Baseline hemoglobin appears to be stable around 8-9 range. Patient was seen by gastroenterology had EGD and colonoscopy on this admission report noted as above. No signs of diverticulitis noted on colonoscopy therefore IV Zosyn was discontinued per GI recommendation. Patient remained stable off antibiotics. Hemoglobin currently stable 7.5 after total 2 unit PRBC transfusion on this admission. Also received 1 dose of IV Venofer 200 mg.  GI recommend to continue Protonix, outpatient follow-up for video capsule endoscopy. Patient reports she would prefer to go home with VNA services at home rather than going to rehab. Currently she is otherwise hemodynamically stable for discharge. Recommended to have repeat CBC with primary care provider in 1 week to trend hemoglobin level. Also recommended outpatient follow-up with GI as able. Patient is in agreement with above plan. No other events reported. Refer to earlier notes for further clarification. Please see above list of diagnoses and related plan for additional information. Condition at Discharge: good     Discharge Day Visit / Exam:     Subjective: Patient denies any further episode of bright red blood per rectum or dark stool. Patient denies chest pain, dyspnea, fever, chills, nausea, vomiting, abdominal pain, dysuria, diarrhea, any other new illness. No other events reported. Vitals: Blood Pressure: 152/79 (11/03/23 0759)  Pulse: 96 (11/03/23 0759)  Temperature: 98 °F (36.7 °C) (11/03/23 0759)  Temp Source: Oral (11/02/23 2022)  Respirations: 18 (11/02/23 2022)  Height: 5' 8" (172.7 cm) (10/31/23 0419)  Weight - Scale: 55.2 kg (121 lb 11.1 oz) (10/31/23 0419)  SpO2: 99 % (11/03/23 1324)  Exam:   Physical Exam  Constitutional:       General: She is not in acute distress. Appearance: Normal appearance. She is not ill-appearing, toxic-appearing or diaphoretic. HENT:      Head: Normocephalic and atraumatic.    Eyes:      Pupils: Pupils are equal, round, and reactive to light. Cardiovascular:      Rate and Rhythm: Normal rate. Pulses: Normal pulses. Pulmonary:      Effort: Pulmonary effort is normal. No respiratory distress. Breath sounds: Normal breath sounds. No wheezing. Comments: O2 sat 99% on 2L NC. Abdominal:      General: Bowel sounds are normal. There is no distension. Palpations: Abdomen is soft. Tenderness: There is no abdominal tenderness. Musculoskeletal:      Cervical back: No rigidity. Right lower leg: No edema. Left lower leg: No edema. Neurological:      Mental Status: She is alert and oriented to person, place, and time. Mental status is at baseline. Psychiatric:         Mood and Affect: Mood normal.         Behavior: Behavior normal.         Discharge instructions/Information to patient and family:   See after visit summary for information provided to patient and family. Provisions for Follow-Up Care:  See after visit summary for information related to follow-up care and any pertinent home health orders. Disposition:     Home with VNA Services (Reminder: Complete face to face encounter)      Planned Readmission:      Discharge Statement:  I spent 35 minutes discharging the patient. This time was spent on the day of discharge. I had direct contact with the patient on the day of discharge. Greater than 50% of the total time was spent examining patient, answering all patient questions, arranging and discussing plan of care with patient as well as directly providing post-discharge instructions. Additional time then spent on discharge activities. Discharge Medications:  See after visit summary for reconciled discharge medications provided to patient and family.       ** Please Note: This note has been constructed using a voice recognition system **

## 2023-11-03 NOTE — TELEPHONE ENCOUNTER
----- Message from Zuleyma Alexander PA-C sent at 11/2/2023 11:16 AM EDT -----  Please call patient's son to let him know that biopsies from the endoscopy were negative, thank you

## 2023-11-03 NOTE — RESPIRATORY THERAPY NOTE
11/02/23 2037   Respiratory Protocol   Protocol Initiated? No   Protocol Selection Respiratory   Language Barrier? No   Medical & Social History Reviewed? Yes   Diagnostic Studies Reviewed? Yes   Physical Assessment Performed? Yes   Home Devices/Therapy Home O2   Respiratory Plan Home Bronchodilator Patient pathway   Respiratory Assessment   General Appearance Alert; Awake   Respiratory Pattern Normal   Chest Assessment Chest expansion symmetrical   Bilateral Breath Sounds Clear   Cough None   Resp Comments even non labored respirations   O2 Device 2L o2 nc   Additional Assessments   SpO2 95 %     Continue with Tid aerosol per home regimen

## 2023-11-03 NOTE — DISCHARGE INSTR - AVS FIRST PAGE
Recommended close follow-up with [facility] with 2 to 5 days of discharge. Recommended to have repeat CBC in 1 week with primary care provider to trend hemoglobin  Continue outpatient follow-up with gastroenterology for capsule endoscopy.

## 2023-11-03 NOTE — NURSING NOTE
Discharge documents given to the patient. Patient demonstrated understanding of given instructions. IV removed. [address]. Son picked up the patient. Patient discharged on 2 L of supplemental o2/

## 2023-11-06 ENCOUNTER — TELEPHONE (OUTPATIENT)
Dept: GASTROENTEROLOGY | Facility: CLINIC | Age: 78
End: 2023-11-06

## 2023-11-06 NOTE — TELEPHONE ENCOUNTER
Scheduled patient for capsule endoscopy on 11/10 emailed instructions to patients son at [email or web address]

## 2023-11-06 NOTE — TELEPHONE ENCOUNTER
Patients GI provider:  Dr. Coco Oswald    Number to return call: [phone]    Reason for call: Pt son, rahul calling stating  pt was seen in the hospital by Dr. Coco Oswald. Pt son states pt needs labs drawn per discharge instructions. I do not see any labs have been ordered. Pt son states his girlfriend is a nurse and would like to know what labs are being ordered so she can draw them and take them to the lab. Please advise.      Scheduled procedure/appointment date if applicable: capsule endoscopy 11/10

## 2023-11-06 NOTE — TELEPHONE ENCOUNTER
Patients son, Telma Hannah, calling office to schedule capsule endoscopy for patient.  Telma Hannah can be reached at [phone]

## 2023-11-07 DIAGNOSIS — K62.5 BRBPR (BRIGHT RED BLOOD PER RECTUM): Primary | ICD-10-CM

## 2023-11-09 ENCOUNTER — NURSE TRIAGE (OUTPATIENT)
Age: 78
End: 2023-11-09

## 2023-11-09 NOTE — TELEPHONE ENCOUNTER
Reason for Disposition   Information only question and nurse able to answer    Answer Assessment - Initial Assessment Questions  1. REASON FOR CALL or QUESTION: Patient is scheduled for EGD tomorrow. She took a dose of Pepto yesterday and has been taking iron. She is aware that she is ok to proceed. Protocols used:  Information Only Call - No Triage-ADULT-[place]

## 2023-11-10 ENCOUNTER — PROCEDURE VISIT (OUTPATIENT)
Dept: GASTROENTEROLOGY | Facility: CLINIC | Age: 78
End: 2023-11-10
Payer: MEDICARE

## 2023-11-10 DIAGNOSIS — D50.9 IRON DEFICIENCY ANEMIA, UNSPECIFIED IRON DEFICIENCY ANEMIA TYPE: Primary | ICD-10-CM

## 2023-11-12 PROCEDURE — 91110 GI TRC IMG INTRAL ESOPH-ILE: CPT | Performed by: INTERNAL MEDICINE

## 2023-11-12 NOTE — PROGRESS NOTES
She is a 81-year-old female who was admitted to the hospital with critical anemia with hemoglobin of 7 iron deficiency anemia hematochezia. She had endoscopy colonoscopy on October 30, 2023. The endoscopy showed mild erosive gastritis and a duodenal bulb angiectasia treated with APC. She had a colonoscopy that showed left-sided diverticulosis and old clots in the sigmoid suspicious for diverticular bleed. The video capsule endoscopy is normal.  There are no further small bowel angiectasia. I spoke to her son over the phone and told him to have her do the iron pill and the antacid pantoprazole pill for another 8 weeks. I told her to have her blood level rechecked in a few months with her primary.   And I told him to have her be on a high-fiber diet

## 2023-11-18 ENCOUNTER — HOSPITAL ENCOUNTER (INPATIENT)
Facility: HOSPITAL | Age: 78
LOS: 3 days | Discharge: HOME HEALTH | DRG: 191 | End: 2023-11-21
Attending: EMERGENCY MEDICINE | Admitting: INTERNAL MEDICINE
Payer: MEDICARE

## 2023-11-18 ENCOUNTER — APPOINTMENT (EMERGENCY)
Dept: RADIOLOGY | Facility: HOSPITAL | Age: 78
DRG: 191 | End: 2023-11-18
Payer: MEDICARE

## 2023-11-18 DIAGNOSIS — I10 ESSENTIAL HYPERTENSION: ICD-10-CM

## 2023-11-18 DIAGNOSIS — J44.1 COPD EXACERBATION (HCC): Primary | ICD-10-CM

## 2023-11-18 LAB
2HR DELTA HS TROPONIN: 3 NG/L
4HR DELTA HS TROPONIN: 2 NG/L
ALBUMIN SERPL BCP-MCNC: 3.9 G/DL (ref 3.5–5)
ALP SERPL-CCNC: 91 U/L (ref 34–104)
ALT SERPL W P-5'-P-CCNC: 3 U/L (ref 7–52)
ANION GAP SERPL CALCULATED.3IONS-SCNC: 4 MMOL/L
AST SERPL W P-5'-P-CCNC: 13 U/L (ref 13–39)
ATRIAL RATE: 93 BPM
BASE EX.OXY STD BLDV CALC-SCNC: 50.6 % (ref 60–80)
BASE EXCESS BLDV CALC-SCNC: 8.5 MMOL/L
BASOPHILS # BLD AUTO: 0.03 THOUSANDS/ÂΜL (ref 0–0.1)
BASOPHILS NFR BLD AUTO: 0 % (ref 0–1)
BILIRUB SERPL-MCNC: 0.3 MG/DL (ref 0.2–1)
BNP SERPL-MCNC: 51 PG/ML (ref 0–100)
BUN SERPL-MCNC: 17 MG/DL (ref 5–25)
CALCIUM SERPL-MCNC: 9 MG/DL (ref 8.4–10.2)
CARDIAC TROPONIN I PNL SERPL HS: 6 NG/L
CARDIAC TROPONIN I PNL SERPL HS: 8 NG/L
CARDIAC TROPONIN I PNL SERPL HS: 9 NG/L
CHLORIDE SERPL-SCNC: 95 MMOL/L (ref 96–108)
CO2 SERPL-SCNC: 38 MMOL/L (ref 21–32)
CREAT SERPL-MCNC: 0.85 MG/DL (ref 0.6–1.3)
EOSINOPHIL # BLD AUTO: 0.26 THOUSAND/ÂΜL (ref 0–0.61)
EOSINOPHIL NFR BLD AUTO: 3 % (ref 0–6)
ERYTHROCYTE [DISTWIDTH] IN BLOOD BY AUTOMATED COUNT: 14.6 % (ref 11.6–15.1)
GFR SERPL CREATININE-BSD FRML MDRD: 66 ML/MIN/1.73SQ M
GLUCOSE SERPL-MCNC: 95 MG/DL (ref 65–140)
HCO3 BLDV-SCNC: 37.4 MMOL/L (ref 24–30)
HCT VFR BLD AUTO: 30.8 % (ref 34.8–46.1)
HGB BLD-MCNC: 9.3 G/DL (ref 11.5–15.4)
IMM GRANULOCYTES # BLD AUTO: 0.05 THOUSAND/UL (ref 0–0.2)
IMM GRANULOCYTES NFR BLD AUTO: 1 % (ref 0–2)
LYMPHOCYTES # BLD AUTO: 2.01 THOUSANDS/ÂΜL (ref 0.6–4.47)
LYMPHOCYTES NFR BLD AUTO: 27 % (ref 14–44)
MCH RBC QN AUTO: 32.3 PG (ref 26.8–34.3)
MCHC RBC AUTO-ENTMCNC: 30.2 G/DL (ref 31.4–37.4)
MCV RBC AUTO: 107 FL (ref 82–98)
MONOCYTES # BLD AUTO: 0.55 THOUSAND/ÂΜL (ref 0.17–1.22)
MONOCYTES NFR BLD AUTO: 7 % (ref 4–12)
NEUTROPHILS # BLD AUTO: 4.68 THOUSANDS/ÂΜL (ref 1.85–7.62)
NEUTS SEG NFR BLD AUTO: 62 % (ref 43–75)
NRBC BLD AUTO-RTO: 0 /100 WBCS
O2 CT BLDV-SCNC: 7 ML/DL
P AXIS: 88 DEGREES
PCO2 BLDV: 82.5 MM HG (ref 42–50)
PH BLDV: 7.27 [PH] (ref 7.3–7.4)
PLATELET # BLD AUTO: 169 THOUSANDS/UL (ref 149–390)
PMV BLD AUTO: 10 FL (ref 8.9–12.7)
PO2 BLDV: 27.6 MM HG (ref 35–45)
POTASSIUM SERPL-SCNC: 4.1 MMOL/L (ref 3.5–5.3)
PR INTERVAL: 150 MS
PROT SERPL-MCNC: 6.3 G/DL (ref 6.4–8.4)
QRS AXIS: 91 DEGREES
QRSD INTERVAL: 88 MS
QT INTERVAL: 370 MS
QTC INTERVAL: 460 MS
RBC # BLD AUTO: 2.88 MILLION/UL (ref 3.81–5.12)
SODIUM SERPL-SCNC: 137 MMOL/L (ref 135–147)
T WAVE AXIS: 82 DEGREES
VENTRICULAR RATE: 93 BPM
WBC # BLD AUTO: 7.58 THOUSAND/UL (ref 4.31–10.16)

## 2023-11-18 PROCEDURE — 94644 CONT INHLJ TX 1ST HOUR: CPT

## 2023-11-18 PROCEDURE — 83880 ASSAY OF NATRIURETIC PEPTIDE: CPT | Performed by: EMERGENCY MEDICINE

## 2023-11-18 PROCEDURE — 96366 THER/PROPH/DIAG IV INF ADDON: CPT

## 2023-11-18 PROCEDURE — 94760 N-INVAS EAR/PLS OXIMETRY 1: CPT

## 2023-11-18 PROCEDURE — 85025 COMPLETE CBC W/AUTO DIFF WBC: CPT | Performed by: EMERGENCY MEDICINE

## 2023-11-18 PROCEDURE — 84484 ASSAY OF TROPONIN QUANT: CPT | Performed by: EMERGENCY MEDICINE

## 2023-11-18 PROCEDURE — 93010 ELECTROCARDIOGRAM REPORT: CPT | Performed by: INTERNAL MEDICINE

## 2023-11-18 PROCEDURE — 71046 X-RAY EXAM CHEST 2 VIEWS: CPT

## 2023-11-18 PROCEDURE — 94640 AIRWAY INHALATION TREATMENT: CPT

## 2023-11-18 PROCEDURE — 80053 COMPREHEN METABOLIC PANEL: CPT | Performed by: EMERGENCY MEDICINE

## 2023-11-18 PROCEDURE — 36415 COLL VENOUS BLD VENIPUNCTURE: CPT | Performed by: EMERGENCY MEDICINE

## 2023-11-18 PROCEDURE — 99223 1ST HOSP IP/OBS HIGH 75: CPT | Performed by: INTERNAL MEDICINE

## 2023-11-18 PROCEDURE — 96365 THER/PROPH/DIAG IV INF INIT: CPT

## 2023-11-18 PROCEDURE — 94664 DEMO&/EVAL PT USE INHALER: CPT

## 2023-11-18 PROCEDURE — 99285 EMERGENCY DEPT VISIT HI MDM: CPT

## 2023-11-18 PROCEDURE — 93005 ELECTROCARDIOGRAM TRACING: CPT

## 2023-11-18 PROCEDURE — 82805 BLOOD GASES W/O2 SATURATION: CPT | Performed by: EMERGENCY MEDICINE

## 2023-11-18 RX ORDER — PREDNISONE 20 MG/1
60 TABLET ORAL ONCE
Status: COMPLETED | OUTPATIENT
Start: 2023-11-18 | End: 2023-11-18

## 2023-11-18 RX ORDER — METHYLPREDNISOLONE SODIUM SUCCINATE 40 MG/ML
40 INJECTION, POWDER, LYOPHILIZED, FOR SOLUTION INTRAMUSCULAR; INTRAVENOUS EVERY 8 HOURS
Status: DISCONTINUED | OUTPATIENT
Start: 2023-11-18 | End: 2023-11-19

## 2023-11-18 RX ORDER — PANTOPRAZOLE SODIUM 40 MG/1
40 TABLET, DELAYED RELEASE ORAL DAILY
Status: DISCONTINUED | OUTPATIENT
Start: 2023-11-19 | End: 2023-11-21 | Stop reason: HOSPADM

## 2023-11-18 RX ORDER — SODIUM CHLORIDE FOR INHALATION 0.9 %
12 VIAL, NEBULIZER (ML) INHALATION ONCE
Status: COMPLETED | OUTPATIENT
Start: 2023-11-18 | End: 2023-11-18

## 2023-11-18 RX ORDER — LISINOPRIL 20 MG/1
40 TABLET ORAL DAILY
Status: DISCONTINUED | OUTPATIENT
Start: 2023-11-19 | End: 2023-11-21 | Stop reason: HOSPADM

## 2023-11-18 RX ORDER — ALBUTEROL SULFATE 2.5 MG/3ML
1 SOLUTION RESPIRATORY (INHALATION) ONCE
Status: COMPLETED | OUTPATIENT
Start: 2023-11-18 | End: 2023-11-18

## 2023-11-18 RX ORDER — ALBUTEROL SULFATE 90 UG/1
2 AEROSOL, METERED RESPIRATORY (INHALATION) EVERY 6 HOURS PRN
Status: DISCONTINUED | OUTPATIENT
Start: 2023-11-18 | End: 2023-11-21 | Stop reason: HOSPADM

## 2023-11-18 RX ORDER — CEFTRIAXONE 1 G/50ML
1000 INJECTION, SOLUTION INTRAVENOUS ONCE
Status: COMPLETED | OUTPATIENT
Start: 2023-11-18 | End: 2023-11-18

## 2023-11-18 RX ORDER — BUDESONIDE AND FORMOTEROL FUMARATE DIHYDRATE 160; 4.5 UG/1; UG/1
2 AEROSOL RESPIRATORY (INHALATION) 2 TIMES DAILY
Status: DISCONTINUED | OUTPATIENT
Start: 2023-11-18 | End: 2023-11-21 | Stop reason: HOSPADM

## 2023-11-18 RX ORDER — SODIUM CHLORIDE FOR INHALATION 0.9 %
3 VIAL, NEBULIZER (ML) INHALATION
Status: DISCONTINUED | OUTPATIENT
Start: 2023-11-18 | End: 2023-11-18

## 2023-11-18 RX ORDER — ACETAMINOPHEN 325 MG/1
650 TABLET ORAL EVERY 6 HOURS PRN
Status: DISCONTINUED | OUTPATIENT
Start: 2023-11-18 | End: 2023-11-21 | Stop reason: HOSPADM

## 2023-11-18 RX ORDER — ALBUTEROL SULFATE 2.5 MG/3ML
2.5 SOLUTION RESPIRATORY (INHALATION) ONCE
Status: COMPLETED | OUTPATIENT
Start: 2023-11-18 | End: 2023-11-18

## 2023-11-18 RX ORDER — VILAZODONE HYDROCHLORIDE 20 MG/1
40 TABLET ORAL
Status: DISCONTINUED | OUTPATIENT
Start: 2023-11-19 | End: 2023-11-21 | Stop reason: HOSPADM

## 2023-11-18 RX ORDER — HYDROCHLOROTHIAZIDE 25 MG/1
25 TABLET ORAL DAILY
Status: DISCONTINUED | OUTPATIENT
Start: 2023-11-19 | End: 2023-11-21 | Stop reason: HOSPADM

## 2023-11-18 RX ORDER — CARVEDILOL 12.5 MG/1
25 TABLET ORAL 2 TIMES DAILY WITH MEALS
Status: DISCONTINUED | OUTPATIENT
Start: 2023-11-18 | End: 2023-11-21 | Stop reason: HOSPADM

## 2023-11-18 RX ORDER — LEVALBUTEROL INHALATION SOLUTION 1.25 MG/3ML
1.25 SOLUTION RESPIRATORY (INHALATION)
Status: DISCONTINUED | OUTPATIENT
Start: 2023-11-18 | End: 2023-11-19

## 2023-11-18 RX ORDER — HEPARIN SODIUM 5000 [USP'U]/ML
5000 INJECTION, SOLUTION INTRAVENOUS; SUBCUTANEOUS EVERY 8 HOURS SCHEDULED
Status: DISCONTINUED | OUTPATIENT
Start: 2023-11-19 | End: 2023-11-21 | Stop reason: HOSPADM

## 2023-11-18 RX ORDER — MAGNESIUM SULFATE HEPTAHYDRATE 40 MG/ML
2 INJECTION, SOLUTION INTRAVENOUS ONCE
Status: COMPLETED | OUTPATIENT
Start: 2023-11-18 | End: 2023-11-18

## 2023-11-18 RX ORDER — BUPROPION HYDROCHLORIDE 75 MG/1
75 TABLET ORAL DAILY
Status: DISCONTINUED | OUTPATIENT
Start: 2023-11-18 | End: 2023-11-21 | Stop reason: HOSPADM

## 2023-11-18 RX ADMIN — METHYLPREDNISOLONE SODIUM SUCCINATE 40 MG: 40 INJECTION, POWDER, FOR SOLUTION INTRAMUSCULAR; INTRAVENOUS at 22:15

## 2023-11-18 RX ADMIN — ALBUTEROL SULFATE 2.5 MG: 2.5 SOLUTION RESPIRATORY (INHALATION) at 14:46

## 2023-11-18 RX ADMIN — LEVALBUTEROL HYDROCHLORIDE 1.25 MG: 1.25 SOLUTION RESPIRATORY (INHALATION) at 19:35

## 2023-11-18 RX ADMIN — BUPROPION HYDROCHLORIDE 75 MG: 75 TABLET, FILM COATED ORAL at 17:40

## 2023-11-18 RX ADMIN — BUDESONIDE AND FORMOTEROL FUMARATE DIHYDRATE 2 PUFF: 160; 4.5 AEROSOL RESPIRATORY (INHALATION) at 19:01

## 2023-11-18 RX ADMIN — CARVEDILOL 25 MG: 12.5 TABLET, FILM COATED ORAL at 17:40

## 2023-11-18 RX ADMIN — ZANUBRUTINIB 160 MG: 80 CAPSULE, GELATIN COATED ORAL at 18:58

## 2023-11-18 RX ADMIN — CEFTRIAXONE 1000 MG: 1 INJECTION, SOLUTION INTRAVENOUS at 14:46

## 2023-11-18 RX ADMIN — ALBUTEROL SULFATE 10 MG: 2.5 SOLUTION RESPIRATORY (INHALATION) at 12:20

## 2023-11-18 RX ADMIN — PREDNISONE 60 MG: 20 TABLET ORAL at 12:22

## 2023-11-18 RX ADMIN — IPRATROPIUM BROMIDE 1 MG: 0.5 SOLUTION RESPIRATORY (INHALATION) at 12:20

## 2023-11-18 RX ADMIN — IPRATROPIUM BROMIDE 0.5 MG: 0.5 SOLUTION RESPIRATORY (INHALATION) at 19:35

## 2023-11-18 RX ADMIN — Medication 12 ML: at 12:21

## 2023-11-18 RX ADMIN — MAGNESIUM SULFATE HEPTAHYDRATE 2 G: 40 INJECTION, SOLUTION INTRAVENOUS at 12:30

## 2023-11-18 RX ADMIN — METHYLPREDNISOLONE SODIUM SUCCINATE 40 MG: 40 INJECTION, POWDER, FOR SOLUTION INTRAMUSCULAR; INTRAVENOUS at 14:46

## 2023-11-18 NOTE — ASSESSMENT & PLAN NOTE
27-year-old male who initially presented with shortness of breath and wheezing received IV steroids and nebs in the ER and still with significant wheezing  Currently at baseline 4 L nasal cannula  We will place on IV steroids 40 every 8 hours  Scheduled nebs  Wean steroids as able

## 2023-11-18 NOTE — ED PROVIDER NOTES
History  Chief Complaint   Patient presents with    Shortness of Breath     Patient arrives via EMS with co SOB that started 5 hours ago. Per EMS Patient spo2 in the 80%. Patient received 1 albuterol via EMS spo02 98%. Hx of COPD. Patient is a 60-year-old female past medical history of CAD, hypertension, COPD on 4 L, anemia, diverticulitis, CHF presenting with shortness of breath. Patient notes shortness of breath beginning 5 hours ago which she states is exertional in nature, associated with mild cough which is productive, nausea. States that she has been compliant with her medications has never been intubated for COPD wears 4 L at baseline. Was saturating 80% on 4 L per EMS and improved to the 90s after 1 albuterol. She denies any fevers, upper respiratory symptoms, rashes, vision changes, dysuria, leg pain or swelling, chest pain, vomiting, dizziness. Was recently admitted for diverticulitis. Prior to Admission Medications   Prescriptions Last Dose Informant Patient Reported? Taking?    Calcium-Magnesium-Vitamin D (CALCIUM 500 PO)   Yes No   Sig: Take 1,000 mg by mouth daily   HYDROcodone-acetaminophen (NORCO) 5-325 mg per tablet   Yes No   Sig: Take 1 tablet by mouth every 6 (six) hours as needed for pain   Zanubrutinib 80 MG CAPS   Yes No   Sig: Take 160 mg by mouth 2 (two) times a day   albuterol (VENTOLIN HFA) 90 mcg/act inhaler   No No   Sig: Inhale 2 puffs every 6 (six) hours as needed for wheezing   aspirin (ECOTRIN LOW STRENGTH) 81 mg EC tablet   No No   Sig: Take 1 tablet (81 mg total) by mouth daily   benzonatate (TESSALON PERLES) 100 mg capsule   No No   Sig: Take 1 capsule (100 mg total) by mouth 3 (three) times a day as needed for cough   Patient not taking: Reported on 4/30/2023   buPROPion (WELLBUTRIN) 75 mg tablet   Yes No   Sig: Take 75 mg by mouth in the morning   budesonide-formoterol (SYMBICORT) 160-4.5 mcg/act inhaler   No No   Sig: Inhale 2 puffs 2 (two) times a day Rinse mouth after use.    cariprazine (Vraylar) 1.5 MG capsule   Yes No   Sig: Take 1.5 mg by mouth daily   carvedilol (COREG) 25 mg tablet   Yes No   Sig: Take 25 mg by mouth 2 (two) times a day with meals   docusate sodium (COLACE) 100 mg capsule Not Taking  No No   Sig: Take 1 capsule (100 mg total) by mouth 2 (two) times a day as needed for constipation   Patient not taking: Reported on 11/18/2023   enalapril (VASOTEC) 5 mg tablet   No No   Sig: Take 1 tablet (5 mg total) by mouth 2 (two) times a day   Patient taking differently: Take 20 mg by mouth 2 (two) times a day   ferrous sulfate 325 (65 Fe) mg tablet Not Taking  No No   Sig: Take 1 tablet (325 mg total) by mouth 2 (two) times a day with meals   Patient not taking: Reported on 11/18/2023   furosemide (LASIX) 20 mg tablet   No No   Sig: Take 1 tablet (20 mg total) by mouth daily   guaiFENesin (MUCINEX) 600 mg 12 hr tablet   No No   Sig: Take 1 tablet (600 mg total) by mouth 2 (two) times a day   hydrOXYzine HCL (ATARAX) 25 mg tablet Past Month  No Yes   Sig: Take 1 tablet (25 mg total) by mouth every 6 (six) hours as needed for anxiety   hydrochlorothiazide (HYDRODIURIL) 25 mg tablet   No No   Sig: Take 1 tablet (25 mg total) by mouth daily   hydrocortisone (ANUSOL-HC) 2.5 % rectal cream Not Taking  No No   Sig: Apply topically 2 (two) times a day   Patient not taking: Reported on 11/18/2023   ipratropium (ATROVENT) 0.02 % nebulizer solution   No No   Sig: Take 2.5 mL (0.5 mg total) by nebulization 3 (three) times a day   melatonin 3 mg   No No   Sig: Take 1 tablet (3 mg total) by mouth daily at bedtime as needed (insomnia)   nitroglycerin (NITROSTAT) 0.4 mg SL tablet   Yes No   Sig: Place 0.4 mg under the tongue every 5 (five) minutes as needed for chest pain   pantoprazole (PROTONIX) 40 mg tablet   No No   Sig: Take 1 tablet (40 mg total) by mouth daily   sodium chloride 0.9 % nebulizer solution   No No   Sig: Take 3 mL by nebulization 3 (three) times a day vilazodone (VIIBRYD) 40 mg tablet   Yes No   Sig: Take 40 mg by mouth daily with breakfast      Facility-Administered Medications: None       Past Medical History:   Diagnosis Date    Acute congestive heart failure ([address]) 2021    Anxiety     Cardiac disease     Chest pain 2021    Chronic pain disorder     COPD (chronic obstructive pulmonary disease) (HCC)     Depression     Heart disease     Hyperlipidemia     Hypertension     MI (myocardial infarction) ([address])     MRSA (methicillin resistant Staphylococcus aureus)     Renal disorder     benign kidney tumor       Past Surgical History:   Procedure Laterality Date    APPENDECTOMY      ELBOW BURSA SURGERY Left 2018    D/T MRSA INFECTION    SPINAL FUSION      L7 L9       Family History   Problem Relation Age of Onset    Heart disease Mother     Cancer Father      I have reviewed and agree with the history as documented. E-Cigarette/Vaping    E-Cigarette Use Never User      E-Cigarette/Vaping Substances    Nicotine No     THC No     CBD No     Flavoring No     Other No     Unknown No      Social History     Tobacco Use    Smoking status: Former     Packs/day: 1.00     Years: 60.00     Total pack years: 60.00     Types: Cigarettes     Quit date:      Years since quittin.8    Smokeless tobacco: Never    Tobacco comments:     She has close to a 60 pack-year smoking history, most recently has cut down to 4-5 cigarettes daily   Vaping Use    Vaping Use: Never used   Substance Use Topics    Alcohol use: Never    Drug use: No       Review of Systems   All other systems reviewed and are negative. Physical Exam  Physical Exam  Vitals reviewed. Constitutional:       General: She is not in acute distress. Appearance: Normal appearance. She is not ill-appearing.    HENT:      Mouth/Throat:      Mouth: Mucous membranes are moist.   Eyes:      Conjunctiva/sclera: Conjunctivae normal.      Comments: Pale conjunctiva   Cardiovascular:      Rate and Rhythm: Normal rate and regular rhythm. Pulses: Normal pulses. Heart sounds: Normal heart sounds. Pulmonary:      Effort: Pulmonary effort is normal.      Breath sounds: Examination of the right-upper field reveals decreased breath sounds and wheezing. Examination of the left-upper field reveals decreased breath sounds and wheezing. Examination of the right-middle field reveals decreased breath sounds and wheezing. Examination of the left-middle field reveals decreased breath sounds and wheezing. Examination of the right-lower field reveals decreased breath sounds, wheezing and rales. Examination of the left-lower field reveals decreased breath sounds, wheezing and rales. Decreased breath sounds, wheezing and rales present. Abdominal:      General: Abdomen is flat. Palpations: Abdomen is soft. Tenderness: There is no abdominal tenderness. Musculoskeletal:         General: No swelling. Normal range of motion. Cervical back: Neck supple. Right lower leg: No tenderness. No edema. Left lower leg: No tenderness. No edema. Skin:     General: Skin is warm and dry. Neurological:      General: No focal deficit present. Mental Status: She is alert.    Psychiatric:         Mood and Affect: Mood normal.         Vital Signs  ED Triage Vitals   Temperature Pulse Respirations Blood Pressure SpO2   11/18/23 1539 11/18/23 1154 11/18/23 1154 11/18/23 1154 11/18/23 1154   97.9 °F (36.6 °C) 92 20 128/62 93 %      Temp Source Heart Rate Source Patient Position - Orthostatic VS BP Location FiO2 (%)   11/18/23 1154 11/18/23 1154 11/18/23 1154 11/18/23 1154 --   Oral Monitor Sitting Right arm       Pain Score       --                  Vitals:    11/18/23 1430 11/18/23 1500 11/18/23 1539 11/18/23 1554   BP: 111/62 130/65 138/72    Pulse: 93 86 105 87   Patient Position - Orthostatic VS: Sitting Sitting           Visual Acuity      ED Medications  Medications   heparin (porcine) subcutaneous injection 5,000 Units (has no administration in time range)   acetaminophen (TYLENOL) tablet 650 mg (has no administration in time range)   levalbuterol (XOPENEX) inhalation solution 1.25 mg ( Nebulization Canceled Entry 11/18/23 1426)   ipratropium (ATROVENT) 0.02 % inhalation solution 0.5 mg ( Nebulization Canceled Entry 11/18/23 1426)   methylPREDNISolone sodium succinate (Solu-MEDROL) injection 40 mg (40 mg Intravenous Given 11/18/23 1446)   albuterol (FOR EMS ONLY) (2.5 mg/3 mL) 0.083 % inhalation solution 2.5 mg (0 mg Does not apply Given to EMS 11/18/23 1201)   albuterol inhalation solution 10 mg (10 mg Nebulization Given 11/18/23 1220)   ipratropium (ATROVENT) 0.02 % inhalation solution 1 mg (1 mg Nebulization Given 11/18/23 1220)   sodium chloride 0.9 % inhalation solution 12 mL (12 mL Nebulization Given 11/18/23 1221)   predniSONE tablet 60 mg (60 mg Oral Given 11/18/23 1222)   magnesium sulfate 2 g/50 mL IVPB (premix) 2 g (0 g Intravenous Stopped 11/18/23 1430)   albuterol inhalation solution 2.5 mg (2.5 mg Nebulization Given 11/18/23 1446)   cefTRIAXone (ROCEPHIN) IVPB (premix in dextrose) 1,000 mg 50 mL (0 mg Intravenous Stopped 11/18/23 1620)       Diagnostic Studies  Results Reviewed       Procedure Component Value Units Date/Time    HS Troponin I 2hr [[id number]]  (Normal) Resulted: 11/18/23 1534    Lab Status: Final result Specimen: Blood Updated: 11/18/23 1534     hs TnI 2hr 9 ng/L      Delta 2hr hsTnI 3 ng/L     HS Troponin I 4hr [[id number]]     Lab Status: No result Specimen: Blood     HS Troponin 0hr (reflex protocol) [[id number]]  (Normal) Collected: 11/18/23 1230    Lab Status: Final result Specimen: Blood from Arm, Left Updated: 11/18/23 1312     hs TnI 0hr 6 ng/L     B-Type Natriuretic Peptide(BNP) [[id number]]  (Normal) Collected: 11/18/23 1230    Lab Status: Final result Specimen: Blood from Arm, Left Updated: 11/18/23 1310     BNP 51 pg/mL     Comprehensive metabolic panel [[id number]] (Abnormal) Collected: 11/18/23 1230    Lab Status: Final result Specimen: Blood from Arm, Left Updated: 11/18/23 1305     Sodium 137 mmol/L      Potassium 4.1 mmol/L      Chloride 95 mmol/L      CO2 38 mmol/L      ANION GAP 4 mmol/L      BUN 17 mg/dL      Creatinine 0.85 mg/dL      Glucose 95 mg/dL      Calcium 9.0 mg/dL      AST 13 U/L      ALT 3 U/L      Alkaline Phosphatase 91 U/L      Total Protein 6.3 g/dL      Albumin 3.9 g/dL      Total Bilirubin 0.30 mg/dL      eGFR 66 ml/min/1.73sq m     Narrative:      [facility] ([facility]D):     Stage 1 with normal or high GFR (GFR > 90 mL/min/1.73 square meters)    Stage 2 Mild CKD (GFR = 60-89 mL/min/1.73 square meters)    Stage 3A Moderate CKD (GFR = 45-59 mL/min/1.73 square meters)    Stage 3B Moderate CKD (GFR = 30-44 mL/min/1.73 square meters)    Stage 4 Severe CKD (GFR = 15-29 mL/min/1.73 square meters)    Stage 5 End Stage CKD (GFR <15 mL/min/1.73 square meters)  Note: GFR calculation is accurate only with a steady state creatinine    Blood gas, venous [[id number]]  (Abnormal) Collected: 11/18/23 1230    Lab Status: Final result Specimen: Blood from Arm, Left Updated: 11/18/23 1242     pH, Alex 7.274     pCO2, Alex 82.5 mm Hg      pO2, Alex 27.6 mm Hg      HCO3, Alex 37.4 mmol/L      Base Excess, Alxe 8.5 mmol/L      O2 Content, Alex 7.0 ml/dL      O2 HGB, VENOUS 50.6 %     CBC and differential [[id number]]  (Abnormal) Collected: 11/18/23 1230    Lab Status: Final result Specimen: Blood from Arm, Left Updated: 11/18/23 1240     WBC 7.58 Thousand/uL      RBC 2.88 Million/uL      Hemoglobin 9.3 g/dL      Hematocrit 30.8 %       fL      MCH 32.3 pg      MCHC 30.2 g/dL      RDW 14.6 %      MPV 10.0 fL      Platelets 013 Thousands/uL      nRBC 0 /100 WBCs      Neutrophils Relative 62 %      Immat GRANS % 1 %      Lymphocytes Relative 27 %      Monocytes Relative 7 %      Eosinophils Relative 3 %      Basophils Relative 0 %      Neutrophils Absolute 4.68 Thousands/µL      Immature Grans Absolute 0.05 Thousand/uL      Lymphocytes Absolute 2.01 Thousands/µL      Monocytes Absolute 0.55 Thousand/µL      Eosinophils Absolute 0.26 Thousand/µL      Basophils Absolute 0.03 Thousands/µL                    XR chest 2 views   ED Interpretation by Eliel Reardon DO (11/18 1226)   NAD                 Procedures  ECG 12 Lead Documentation Only    Date/Time: 11/18/2023 12:06 PM    Performed by: Eliel Reardon DO  Authorized by: Eliel Reardon DO    Patient location:  ED  Previous ECG:     Previous ECG:  Compared to current    Comparison ECG info:  Improved nonspecific T wave abnormality in the inferolateral leads  Interpretation:     Interpretation: normal    Rate:     ECG rate assessment: normal    Rhythm:     Rhythm: sinus rhythm    Ectopy:     Ectopy: none    QRS:     QRS axis:  Right    QRS intervals:  Normal  Conduction:     Conduction: normal    ST segments:     ST segments:  Normal  T waves:     T waves: non-specific    CriticalCare Time    Date/Time: 11/18/2023 4:21 PM    Performed by: Eliel Reardon DO  Authorized by: Eliel Reardon DO    Critical care provider statement:     Critical care time (minutes):  32    Critical care was necessary to treat or prevent imminent or life-threatening deterioration of the following conditions:  Respiratory failure    Critical care was time spent personally by me on the following activities:  Obtaining history from patient or surrogate, development of treatment plan with patient or surrogate, evaluation of patient's response to treatment, examination of patient, interpretation of cardiac output measurements, ordering and performing treatments and interventions, ordering and review of laboratory studies, ordering and review of radiographic studies, re-evaluation of patient's condition and review of old charts           ED Course  ED Course as of 11/18/23 7328 Sat Nov 18, 2023   1347 Patient saturating appropriately on home 3 L however initial VBG shows acidosis and retention, still very tight and wheezy, will admit for COPD exacerbation and give ceftriaxone. Patient does note improvement of her symptoms. SBIRT 20yo+      Flowsheet Row Most Recent Value   Initial Alcohol Screen: US AUDIT-C     1. How often do you have a drink containing alcohol? 0 Filed at: 11/18/2023 1159   Audit-C Score 0 Filed at: 11/18/2023 1150   NIA: How many times in the past year have you. .. Used an illegal drug or used a prescription medication for non-medical reasons? Never Filed at: 11/18/2023 1157                      Medical Decision Making  Patient 60-year-old male past medical [place] CAD, hypertension, COPD, anemia, diverticulitis, CHF presenting with shortness of breath. Patient is in no acute distress at bedside saturating appropriately on 4-1/2 L with diffuse expiratory wheezing, diminished lung sounds throughout, rales at the bases, no lower extremity edema, equal pulses and no other significant physical exam findings. Will give treatment for COPD exacerbation, obtain chest x-ray to assess for pneumonia, labs to assess for electrolyte abnormalities, anemia and continue to monitor. Amount and/or Complexity of Data Reviewed  Labs: ordered. Radiology: ordered and independent interpretation performed. Risk  Prescription drug management. Decision regarding hospitalization.              Disposition  Final diagnoses:   COPD exacerbation ([address])     Time reflects when diagnosis was documented in both MDM as applicable and the Disposition within this note       Time User Action Codes Description Comment    11/18/2023  2:06 PM Deniz Kam Add [J44.1] COPD exacerbation [facility])           ED Disposition       ED Disposition   Admit    Condition   Stable    Date/Time   Sat Nov 18, 2023 1406    Comment   Case was discussed with Dr. Alice Morrison and the patient's admission status was agreed to be Admission Status: inpatient status to the service of Dr. He Castillo . Follow-up Information    None         Current Discharge Medication List        CONTINUE these medications which have NOT CHANGED    Details   hydrOXYzine HCL (ATARAX) 25 mg tablet Take 1 tablet (25 mg total) by mouth every 6 (six) hours as needed for anxiety  Refills: 0    Associated Diagnoses: COPD with acute exacerbation (HCC)      albuterol (VENTOLIN HFA) 90 mcg/act inhaler Inhale 2 puffs every 6 (six) hours as needed for wheezing  Qty: 1 Inhaler, Refills: 0    Comments: Substitution to a formulary equivalent within the same pharmaceutical class is authorized. Associated Diagnoses: COPD exacerbation (HCC)      aspirin (ECOTRIN LOW STRENGTH) 81 mg EC tablet Take 1 tablet (81 mg total) by mouth daily  Qty: 30 tablet, Refills: 0    Associated Diagnoses: COPD (chronic obstructive pulmonary disease) (HCC)      benzonatate (TESSALON PERLES) 100 mg capsule Take 1 capsule (100 mg total) by mouth 3 (three) times a day as needed for cough  Qty: 20 capsule, Refills: 0    Associated Diagnoses: COPD exacerbation (HCC)      budesonide-formoterol (SYMBICORT) 160-4.5 mcg/act inhaler Inhale 2 puffs 2 (two) times a day Rinse mouth after use.   Qty: 1 Inhaler, Refills: 0    Associated Diagnoses: COPD exacerbation ([address])      buPROPion (WELLBUTRIN) 75 mg tablet Take 75 mg by mouth in the morning      Calcium-Magnesium-Vitamin D (CALCIUM 500 PO) Take 1,000 mg by mouth daily      cariprazine (Vraylar) 1.5 MG capsule Take 1.5 mg by mouth daily      carvedilol (COREG) 25 mg tablet Take 25 mg by mouth 2 (two) times a day with meals      docusate sodium (COLACE) 100 mg capsule Take 1 capsule (100 mg total) by mouth 2 (two) times a day as needed for constipation  Qty: 60 capsule, Refills: 0    Associated Diagnoses: Anemia, unspecified type      enalapril (VASOTEC) 5 mg tablet Take 1 tablet (5 mg total) by mouth 2 (two) times a day  Qty: 60 tablet, Refills: 1    Associated Diagnoses: Essential hypertension      ferrous sulfate 325 (65 Fe) mg tablet Take 1 tablet (325 mg total) by mouth 2 (two) times a day with meals  Qty: 60 tablet, Refills: 0    Associated Diagnoses: Anemia, unspecified type      furosemide (LASIX) 20 mg tablet Take 1 tablet (20 mg total) by mouth daily  Qty: 30 tablet, Refills: 0    Associated Diagnoses: COPD (chronic obstructive pulmonary disease) (HCC)      guaiFENesin (MUCINEX) 600 mg 12 hr tablet Take 1 tablet (600 mg total) by mouth 2 (two) times a day  Qty: 60 tablet, Refills: 0    Associated Diagnoses: COPD exacerbation (HCC)      hydrochlorothiazide (HYDRODIURIL) 25 mg tablet Take 1 tablet (25 mg total) by mouth daily  Qty: 30 tablet, Refills: 0    Associated Diagnoses: Essential hypertension      HYDROcodone-acetaminophen (NORCO) 5-325 mg per tablet Take 1 tablet by mouth every 6 (six) hours as needed for pain      hydrocortisone (ANUSOL-HC) 2.5 % rectal cream Apply topically 2 (two) times a day  Qty: 28 g, Refills: 0    Associated Diagnoses: COPD with acute exacerbation (HCC)      ipratropium (ATROVENT) 0.02 % nebulizer solution Take 2.5 mL (0.5 mg total) by nebulization 3 (three) times a day  Qty: 225 mL, Refills: 0    Associated Diagnoses: COPD with acute exacerbation (HCC)      melatonin 3 mg Take 1 tablet (3 mg total) by mouth daily at bedtime as needed (insomnia)  Qty: 30 tablet, Refills: 0    Associated Diagnoses: Major depressive disorder, recurrent episode, moderate (HCC)      nitroglycerin (NITROSTAT) 0.4 mg SL tablet Place 0.4 mg under the tongue every 5 (five) minutes as needed for chest pain      pantoprazole (PROTONIX) 40 mg tablet Take 1 tablet (40 mg total) by mouth daily  Qty: 30 tablet, Refills: 0    Associated Diagnoses: Anemia, unspecified type      sodium chloride 0.9 % nebulizer solution Take 3 mL by nebulization 3 (three) times a day  Refills: 0    Associated Diagnoses: COPD exacerbation (HCC)      vilazodone (VIIBRYD) 40 mg tablet Take 40 mg by mouth daily with breakfast      Zanubrutinib 80 MG CAPS Take 160 mg by mouth 2 (two) times a day             No discharge procedures on file.     PDMP Review         Value Time User    PDMP Reviewed  Yes 8/27/2021  3:13 AM Autumn Middleton PA-C            ED Provider  Electronically Signed by             Cisco Alford DO  11/18/23 1137

## 2023-11-18 NOTE — RESPIRATORY THERAPY NOTE
RT Protocol Note  Jesus Grant 69 y.o. female MRN: [id number]  Unit/Bed#: -01 Encounter: [id number]    Assessment    Principal Problem:    Chronic obstructive pulmonary disease with acute exacerbation ([address])  Active Problems:    Essential hypertension    Tobacco abuse    Chronic respiratory failure with hypoxia (HCC)    CAD (coronary artery disease), native coronary artery    Major depressive disorder, recurrent episode, moderate (HCC)      Home Pulmonary Medications:  Inhalers/neb    Home Devices/Therapy: Home O2    Past Medical History:   Diagnosis Date    Acute congestive heart failure ([address]) 2021    Anxiety     Cardiac disease     Chest pain 2021    Chronic pain disorder     COPD (chronic obstructive pulmonary disease) (HCC)     Depression     Heart disease     Hyperlipidemia     Hypertension     MI (myocardial infarction) ([address])     MRSA (methicillin resistant Staphylococcus aureus)     Renal disorder     benign kidney tumor     Social History     Socioeconomic History    Marital status:      Spouse name: None    Number of children: 3    Years of education: 13    Highest education level: High school graduate   Occupational History    Occupation:      Employer: [employer]     Comment: retired    Tobacco Use    Smoking status: Former     Packs/day: 1.00     Years: 60.00     Total pack years: 60.00     Types: Cigarettes     Quit date: 2016     Years since quittin.8    Smokeless tobacco: Never    Tobacco comments:     She has close to a 60 pack-year smoking history, most recently has cut down to 4-5 cigarettes daily   Vaping Use    Vaping Use: Never used   Substance and Sexual Activity    Alcohol use: Never    Drug use: No    Sexual activity: Not Currently   Other Topics Concern    None   Social History Narrative    None     Social Determinants of Health     Financial Resource Strain: Medium Risk (1/3/2020)    Overall Financial Resource Strain (CARDIA) Difficulty of Paying Living Expenses: Somewhat hard   Food Insecurity: No Food Insecurity (10/30/2023)    Hunger Vital Sign     Worried About Running Out of Food in the Last Year: Never true     Ran Out of Food in the Last Year: Never true   Transportation Needs: No Transportation Needs (10/30/2023)    PRAPARE - Transportation     Lack of Transportation (Medical): No     Lack of Transportation (Non-Medical): No   Physical Activity: Inactive (1/3/2020)    Exercise Vital Sign     Days of Exercise per Week: 0 days     Minutes of Exercise per Session: 0 min   Stress: No Stress Concern Present (1/3/2020)    [facility]     Feeling of Stress : Only a little   Social Connections: Socially Isolated (1/3/2020)    Social Connection and Isolation Panel [NHANES]     Frequency of Communication with Friends and Family: Twice a week     Frequency of Social Gatherings with Friends and Family: Once a week     Attends Mormonism Services: Never     Active Member of Clubs or Organizations: No     Attends Club or Organization Meetings: Never     Marital Status:    Intimate Partner Violence: Not At Risk (1/3/2020)    Humiliation, Afraid, Rape, and Kick questionnaire     Fear of Current or Ex-Partner: No     Emotionally Abused: No     Physically Abused: No     Sexually Abused: No   Housing Stability: Low Risk  (10/30/2023)    Housing Stability Vital Sign     Unable to Pay for Housing in the Last Year: No     Number of Places Lived in the Last Year: 2     Unstable Housing in the Last Year: No       Subjective         Objective    Physical Exam:   Assessment Type: Assess only  General Appearance: Awake, Alert  Respiratory Pattern: Normal  Chest Assessment: Chest expansion symmetrical  Bilateral Breath Sounds: Diminished, Crackles  Cough: None  O2 Device: NC    Vitals:  Blood pressure 138/72, pulse (P) 87, temperature 97.9 °F (36.6 °C), resp.  rate (P) 18, height 5' 8" (1.727 m), weight 55.2 kg (121 lb 11.1 oz), SpO2 (P) 98 %, not currently breastfeeding. Imaging and other studies: I have personally reviewed pertinent reports. O2 Device: NC     Plan    Respiratory Plan: Mild Distress pathway        Resp Comments: Protocol completed. Pt w/ PMH of COPD and uses inhalers and nebs; states she uses nebs 2x/day, but will cont w/ 3x at this time. Also has O2 at home (4L).

## 2023-11-18 NOTE — H&P
[address]  H&P  Name: Perez Sethi 69 y.o. female I MRN: [id number]  Unit/Bed#: ED 13 I Date of Admission: 11/18/2023   Date of Service: 11/18/2023 I Hospital Day: 0      Assessment/Plan   * Chronic obstructive pulmonary disease with acute exacerbation ([address])  Assessment & Plan  73-year-old male who initially presented with shortness of breath and wheezing received IV steroids and nebs in the ER and still with significant wheezing  Currently at baseline 4 L nasal cannula  We will place on IV steroids 40 every 8 hours  Scheduled nebs  Wean steroids as able    Major depressive disorder, recurrent episode, moderate ([address])  Assessment & Plan  Continue bupropion   Mood stable        CAD (coronary artery disease), native coronary artery  Assessment & Plan  Continue carvedilol   Currently without chest pain    Chronic respiratory failure with hypoxia (HCC)  Assessment & Plan  On 4 L nasal cannula at baseline  Secondary to COPD    Tobacco abuse  Assessment & Plan  Smoker    Essential hypertension  Assessment & Plan  Continue enalapril, HCTZ, carvedilol, Lasix  BP currently controlled           VTE Prophylaxis: Heparin  / sequential compression device   Code Status: full code  POLST: There is no POLST form on file for this patient (pre-hospital)  Discussion with family : patient, declined family update   Anticipated Length of Stay:  Patient will be admitted on an Inpatient basis with an anticipated length of stay of  > 2 midnights. Justification for Hospital Stay:      Total Time for Visit, including Counseling / Coordination of Care: 60 minutes. Greater than 50% of this total time spent on direct patient counseling and coordination of care.     Chief Complaint:      Shortness of breath    History of Present Illness:    Perez Sethi is a 69 y.o. female with past medical history significant coronary artery disease, COPD on 4 L nasal cannula at baseline, hypertension initially presented with shortness of breath. Patient reports shortness of breath that began earlier today reports using nebs at home without significant improvement reports some mild cough as well with some sputum. Otherwise denies any acute complaints. Denies chest pain, fevers, chills, abdominal pain, nausea, vomiting, diarrhea, constipation or any other complaints. Denies any leg swelling or orthopnea. Review of Systems:    Review of Systems   Constitutional:  Negative for activity change, appetite change, chills, diaphoresis, fever and unexpected weight change. HENT:  Negative for congestion, facial swelling and rhinorrhea. Eyes:  Negative for photophobia and visual disturbance. Respiratory:  Positive for shortness of breath and wheezing. Negative for cough. Cardiovascular:  Negative for chest pain and palpitations. Gastrointestinal:  Negative for abdominal pain, blood in stool, constipation, diarrhea, nausea and vomiting. Genitourinary:  Negative for decreased urine volume, difficulty urinating, dysuria, flank pain, frequency, hematuria and urgency. Musculoskeletal:  Negative for arthralgias, back pain, joint swelling and myalgias. Neurological:  Negative for dizziness, syncope, facial asymmetry, light-headedness, numbness and headaches. Psychiatric/Behavioral:  Negative for confusion and decreased concentration. The patient is not nervous/anxious.         Past Medical and Surgical History:     Past Medical History:   Diagnosis Date    Acute congestive heart failure ([address]) 8/27/2021    Anxiety     Cardiac disease     Chest pain 8/27/2021    Chronic pain disorder     COPD (chronic obstructive pulmonary disease) (HCC)     Depression     Heart disease     Hyperlipidemia     Hypertension     MI (myocardial infarction) ([address])     MRSA (methicillin resistant Staphylococcus aureus)     Renal disorder     benign kidney tumor       Past Surgical History:   Procedure Laterality Date    APPENDECTOMY      ELBOW BURSA SURGERY Left 02/2018    D/T MRSA INFECTION    SPINAL FUSION      L7 L9       Meds/Allergies:    Prior to Admission medications    Medication Sig Start Date End Date Taking? Authorizing Provider   albuterol (VENTOLIN HFA) 90 mcg/act inhaler Inhale 2 puffs every 6 (six) hours as needed for wheezing 1/7/20   Gabriela Garcia PA-C   aspirin (ECOTRIN LOW STRENGTH) 81 mg EC tablet Take 1 tablet (81 mg total) by mouth daily 8/31/21 9/30/21  Elgin Patten MD   benzonatate (TESSALON PERLES) 100 mg capsule Take 1 capsule (100 mg total) by mouth 3 (three) times a day as needed for cough  Patient not taking: Reported on 4/30/2023 3/7/23   Yusuf Wang PA-C   budesonide-formoterol [facility]) 160-4.5 mcg/act inhaler Inhale 2 puffs 2 (two) times a day Rinse mouth after use.  1/7/20   Gabriela Garcia PA-C   buPROPion [facility]) 75 mg tablet Take 75 mg by mouth in the morning    Historical Provider, MD   Calcium-Magnesium-Vitamin D (CALCIUM 500 PO) Take 1,000 mg by mouth daily    Historical Provider, MD   cariprazine (Vraylar) 1.5 MG capsule Take 1.5 mg by mouth daily    Historical Provider, MD   carvedilol (COREG) 25 mg tablet Take 25 mg by mouth 2 (two) times a day with meals    Historical Provider, MD   docusate sodium (COLACE) 100 mg capsule Take 1 capsule (100 mg total) by mouth 2 (two) times a day as needed for constipation 11/3/23   Domenic YAÑEZ MD   enalapril (VASOTEC) 5 mg tablet Take 1 tablet (5 mg total) by mouth 2 (two) times a day  Patient taking differently: Take 20 mg by mouth 2 (two) times a day 1/8/20   Gabriela Garcia PA-C   ferrous sulfate 325 (65 Fe) mg tablet Take 1 tablet (325 mg total) by mouth 2 (two) times a day with meals 11/3/23   Ofelia YAÑEZ MD   furosemide (LASIX) 20 mg tablet Take 1 tablet (20 mg total) by mouth daily 8/31/21 9/30/21  Early MD Sandor   guaiFENesin (MUCINEX) 600 mg 12 hr tablet Take 1 tablet (600 mg total) by mouth 2 (two) times a day 12/23/18 Ryder Collazo MD   hydrochlorothiazide (HYDRODIURIL) 25 mg tablet Take 1 tablet (25 mg total) by mouth daily 1/7/20   Claudy Lung ANGÉLICA Garcia   HYDROcodone-acetaminophen [facility]) 5-325 mg per tablet Take 1 tablet by mouth every 6 (six) hours as needed for pain    Historical Provider, MD   hydrocortisone (ANUSOL-HC) 2.5 % rectal cream Apply topically 2 (two) times a day 5/5/23   TAMMIE Strange   hydrOXYzine HCL (ATARAX) 25 mg tablet Take 1 tablet (25 mg total) by mouth every 6 (six) hours as needed for anxiety  Patient not taking: Reported on 10/29/2023 5/5/23   TAMMIE Strange   ipratropium (ATROVENT) 0.02 % nebulizer solution Take 2.5 mL (0.5 mg total) by nebulization 3 (three) times a day 5/5/23 6/4/23  TAMMIE Strange   melatonin 3 mg Take 1 tablet (3 mg total) by mouth daily at bedtime as needed (insomnia) 1/7/20   TAMMIE Ames   nitroglycerin (NITROSTAT) 0.4 mg SL tablet Place 0.4 mg under the tongue every 5 (five) minutes as needed for chest pain    Historical Provider, MD   pantoprazole (PROTONIX) 40 mg tablet Take 1 tablet (40 mg total) by mouth daily 11/3/23   Anthony YAÑEZ MD   sodium chloride 0.9 % nebulizer solution Take 3 mL by nebulization 3 (three) times a day 12/23/18   Ryder Collazo MD   vilazodone (VIIBRYD) 40 mg tablet Take 40 mg by mouth daily with breakfast    Historical Provider, MD   Zanubrutinib 80 MG CAPS Take 160 mg by mouth 2 (two) times a day    Historical Provider, MD VALDEZ have reviewed home medications with patient personally. Allergies:    Allergies   Allergen Reactions    Sulfa Antibiotics Anaphylaxis    Iron GI Intolerance    Niacin     Statins Other (See Comments)     cramps       Social History:     Marital Status:      Patient Pre-hospital Living Situation: home  Patient Pre-hospital Level of Mobility: independent  Patient Pre-hospital Diet Restrictions: none  Substance Use History:   Social History     Substance and Sexual Activity   Alcohol Use Never     Social History     Tobacco Use   Smoking Status Former    Packs/day: 1.00    Years: 60.00    Total pack years: 60.00    Types: Cigarettes    Quit date:     Years since quittin.8   Smokeless Tobacco Never   Tobacco Comments    She has close to a 60 pack-year smoking history, most recently has cut down to 4-5 cigarettes daily     Social History     Substance and Sexual Activity   Drug Use No       Family History:    Family History   Problem Relation Age of Onset    Heart disease Mother     Cancer Father        Physical Exam:     Vitals:   Blood Pressure: 123/80 (23 1330)  Pulse: 94 (23 1330)  Temp Source: Oral (23 1154)  Respirations: 21 (23 1330)  Height: 5' 8" (172.7 cm) (23 1154)  Weight - Scale: 55.3 kg (122 lb) (23 1154)  SpO2: 95 % (23 1330)    Physical Exam  Constitutional:       General: She is not in acute distress. Appearance: She is well-developed. She is not diaphoretic. HENT:      Head: Normocephalic and atraumatic. Nose: Nose normal.      Mouth/Throat:      Pharynx: No oropharyngeal exudate. Eyes:      General: No scleral icterus. Right eye: No discharge. Left eye: No discharge. Conjunctiva/sclera: Conjunctivae normal.   Neck:      Thyroid: No thyromegaly. Vascular: No JVD. Cardiovascular:      Rate and Rhythm: Normal rate and regular rhythm. Heart sounds: Normal heart sounds. No murmur heard. No friction rub. No gallop. Pulmonary:      Effort: Respiratory distress present. Breath sounds: Wheezing present. No rales. Chest:      Chest wall: No tenderness. Abdominal:      General: Bowel sounds are normal. There is no distension. Palpations: Abdomen is soft. Tenderness: There is no abdominal tenderness. There is no guarding or rebound. Musculoskeletal:         General: No tenderness or deformity. Normal range of motion.       Cervical back: Normal range of motion and neck supple. Skin:     General: Skin is warm and dry. Findings: No erythema or rash. Neurological:      Mental Status: She is alert. Mental status is at baseline. Cranial Nerves: No cranial nerve deficit. Sensory: No sensory deficit. Motor: No abnormal muscle tone. Coordination: Coordination normal.         =    Additional Data:     Lab Results: I have personally reviewed pertinent reports. Results from last 7 days   Lab Units 11/18/23  1230   WBC Thousand/uL 7.58   HEMOGLOBIN g/dL 9.3*   HEMATOCRIT % 30.8*   PLATELETS Thousands/uL 169   NEUTROS PCT % 62   LYMPHS PCT % 27   MONOS PCT % 7   EOS PCT % 3     Results from last 7 days   Lab Units 11/18/23  1230   SODIUM mmol/L 137   POTASSIUM mmol/L 4.1   CHLORIDE mmol/L 95*   CO2 mmol/L 38*   BUN mg/dL 17   CREATININE mg/dL 0.85   ANION GAP mmol/L 4   CALCIUM mg/dL 9.0   ALBUMIN g/dL 3.9   TOTAL BILIRUBIN mg/dL 0.30   ALK PHOS U/L 91   ALT U/L 3*   AST U/L 13   GLUCOSE RANDOM mg/dL 95                       Imaging: I have personally reviewed pertinent reports. XR chest 2 views   ED Interpretation by Yue Anderson DO (11/18 4646)   NAD          EKG, Pathology, and Other Studies Reviewed on Admission:   EKG: reviewed    Allscripts / Epic Records Reviewed: Yes     ** Please Note: This note has been constructed using a voice recognition system.  **

## 2023-11-19 LAB
ANION GAP SERPL CALCULATED.3IONS-SCNC: 6 MMOL/L
BASOPHILS # BLD AUTO: 0 THOUSANDS/ÂΜL (ref 0–0.1)
BASOPHILS NFR BLD AUTO: 0 % (ref 0–1)
BUN SERPL-MCNC: 23 MG/DL (ref 5–25)
CALCIUM SERPL-MCNC: 9.1 MG/DL (ref 8.4–10.2)
CHLORIDE SERPL-SCNC: 94 MMOL/L (ref 96–108)
CO2 SERPL-SCNC: 39 MMOL/L (ref 21–32)
CREAT SERPL-MCNC: 0.87 MG/DL (ref 0.6–1.3)
EOSINOPHIL # BLD AUTO: 0 THOUSAND/ÂΜL (ref 0–0.61)
EOSINOPHIL NFR BLD AUTO: 0 % (ref 0–6)
ERYTHROCYTE [DISTWIDTH] IN BLOOD BY AUTOMATED COUNT: 14.6 % (ref 11.6–15.1)
GFR SERPL CREATININE-BSD FRML MDRD: 64 ML/MIN/1.73SQ M
GLUCOSE SERPL-MCNC: 158 MG/DL (ref 65–140)
HCT VFR BLD AUTO: 29 % (ref 34.8–46.1)
HGB BLD-MCNC: 9.1 G/DL (ref 11.5–15.4)
IMM GRANULOCYTES # BLD AUTO: 0.08 THOUSAND/UL (ref 0–0.2)
IMM GRANULOCYTES NFR BLD AUTO: 2 % (ref 0–2)
LYMPHOCYTES # BLD AUTO: 0.97 THOUSANDS/ÂΜL (ref 0.6–4.47)
LYMPHOCYTES NFR BLD AUTO: 21 % (ref 14–44)
MCH RBC QN AUTO: 32.6 PG (ref 26.8–34.3)
MCHC RBC AUTO-ENTMCNC: 31.4 G/DL (ref 31.4–37.4)
MCV RBC AUTO: 104 FL (ref 82–98)
MONOCYTES # BLD AUTO: 0.24 THOUSAND/ÂΜL (ref 0.17–1.22)
MONOCYTES NFR BLD AUTO: 5 % (ref 4–12)
NEUTROPHILS # BLD AUTO: 3.37 THOUSANDS/ÂΜL (ref 1.85–7.62)
NEUTS SEG NFR BLD AUTO: 72 % (ref 43–75)
NRBC BLD AUTO-RTO: 0 /100 WBCS
PLATELET # BLD AUTO: 177 THOUSANDS/UL (ref 149–390)
PMV BLD AUTO: 9.9 FL (ref 8.9–12.7)
POTASSIUM SERPL-SCNC: 3.7 MMOL/L (ref 3.5–5.3)
PROCALCITONIN SERPL-MCNC: <0.05 NG/ML
RBC # BLD AUTO: 2.79 MILLION/UL (ref 3.81–5.12)
SODIUM SERPL-SCNC: 139 MMOL/L (ref 135–147)
WBC # BLD AUTO: 4.66 THOUSAND/UL (ref 4.31–10.16)

## 2023-11-19 PROCEDURE — 99232 SBSQ HOSP IP/OBS MODERATE 35: CPT | Performed by: FAMILY MEDICINE

## 2023-11-19 PROCEDURE — 94760 N-INVAS EAR/PLS OXIMETRY 1: CPT

## 2023-11-19 PROCEDURE — 80048 BASIC METABOLIC PNL TOTAL CA: CPT | Performed by: INTERNAL MEDICINE

## 2023-11-19 PROCEDURE — 94664 DEMO&/EVAL PT USE INHALER: CPT

## 2023-11-19 PROCEDURE — 85025 COMPLETE CBC W/AUTO DIFF WBC: CPT | Performed by: INTERNAL MEDICINE

## 2023-11-19 PROCEDURE — 84145 PROCALCITONIN (PCT): CPT | Performed by: INTERNAL MEDICINE

## 2023-11-19 PROCEDURE — 94640 AIRWAY INHALATION TREATMENT: CPT

## 2023-11-19 RX ORDER — METHYLPREDNISOLONE SODIUM SUCCINATE 40 MG/ML
40 INJECTION, POWDER, LYOPHILIZED, FOR SOLUTION INTRAMUSCULAR; INTRAVENOUS EVERY 12 HOURS SCHEDULED
Status: COMPLETED | OUTPATIENT
Start: 2023-11-19 | End: 2023-11-20

## 2023-11-19 RX ORDER — LEVALBUTEROL INHALATION SOLUTION 1.25 MG/3ML
1.25 SOLUTION RESPIRATORY (INHALATION)
Status: DISCONTINUED | OUTPATIENT
Start: 2023-11-19 | End: 2023-11-21 | Stop reason: HOSPADM

## 2023-11-19 RX ORDER — HYDROCODONE BITARTRATE AND ACETAMINOPHEN 5; 325 MG/1; MG/1
1 TABLET ORAL EVERY 6 HOURS PRN
Status: DISCONTINUED | OUTPATIENT
Start: 2023-11-19 | End: 2023-11-21 | Stop reason: HOSPADM

## 2023-11-19 RX ORDER — LANOLIN ALCOHOL/MO/W.PET/CERES
3 CREAM (GRAM) TOPICAL
Status: DISCONTINUED | OUTPATIENT
Start: 2023-11-19 | End: 2023-11-21 | Stop reason: HOSPADM

## 2023-11-19 RX ADMIN — LEVALBUTEROL HYDROCHLORIDE 1.25 MG: 1.25 SOLUTION RESPIRATORY (INHALATION) at 19:35

## 2023-11-19 RX ADMIN — HEPARIN SODIUM 5000 UNITS: 5000 INJECTION INTRAVENOUS; SUBCUTANEOUS at 14:22

## 2023-11-19 RX ADMIN — HEPARIN SODIUM 5000 UNITS: 5000 INJECTION INTRAVENOUS; SUBCUTANEOUS at 05:11

## 2023-11-19 RX ADMIN — ZANUBRUTINIB 160 MG: 80 CAPSULE, GELATIN COATED ORAL at 08:10

## 2023-11-19 RX ADMIN — MELATONIN 3 MG: at 21:18

## 2023-11-19 RX ADMIN — BUDESONIDE AND FORMOTEROL FUMARATE DIHYDRATE 2 PUFF: 160; 4.5 AEROSOL RESPIRATORY (INHALATION) at 09:08

## 2023-11-19 RX ADMIN — LEVALBUTEROL HYDROCHLORIDE 1.25 MG: 1.25 SOLUTION RESPIRATORY (INHALATION) at 08:29

## 2023-11-19 RX ADMIN — CARVEDILOL 25 MG: 12.5 TABLET, FILM COATED ORAL at 08:08

## 2023-11-19 RX ADMIN — LEVALBUTEROL HYDROCHLORIDE 1.25 MG: 1.25 SOLUTION RESPIRATORY (INHALATION) at 13:54

## 2023-11-19 RX ADMIN — METHYLPREDNISOLONE SODIUM SUCCINATE 40 MG: 40 INJECTION, POWDER, FOR SOLUTION INTRAMUSCULAR; INTRAVENOUS at 21:18

## 2023-11-19 RX ADMIN — HYDROCODONE BITARTRATE AND ACETAMINOPHEN 1 TABLET: 5; 325 TABLET ORAL at 11:21

## 2023-11-19 RX ADMIN — IPRATROPIUM BROMIDE 0.5 MG: 0.5 SOLUTION RESPIRATORY (INHALATION) at 08:29

## 2023-11-19 RX ADMIN — ACETAMINOPHEN 650 MG: 325 TABLET, FILM COATED ORAL at 21:18

## 2023-11-19 RX ADMIN — HYDROCHLOROTHIAZIDE 25 MG: 25 TABLET ORAL at 08:08

## 2023-11-19 RX ADMIN — METHYLPREDNISOLONE SODIUM SUCCINATE 40 MG: 40 INJECTION, POWDER, FOR SOLUTION INTRAMUSCULAR; INTRAVENOUS at 05:11

## 2023-11-19 RX ADMIN — IPRATROPIUM BROMIDE 0.5 MG: 0.5 SOLUTION RESPIRATORY (INHALATION) at 19:35

## 2023-11-19 RX ADMIN — CARVEDILOL 25 MG: 12.5 TABLET, FILM COATED ORAL at 18:29

## 2023-11-19 RX ADMIN — ZANUBRUTINIB 160 MG: 80 CAPSULE, GELATIN COATED ORAL at 18:30

## 2023-11-19 RX ADMIN — ASPIRIN 81 MG: 81 TABLET, COATED ORAL at 08:08

## 2023-11-19 RX ADMIN — PANTOPRAZOLE SODIUM 40 MG: 40 TABLET, DELAYED RELEASE ORAL at 08:09

## 2023-11-19 RX ADMIN — VILAZODONE HYDROCHLORIDE 40 MG: 20 TABLET ORAL at 08:11

## 2023-11-19 RX ADMIN — IPRATROPIUM BROMIDE 0.5 MG: 0.5 SOLUTION RESPIRATORY (INHALATION) at 13:54

## 2023-11-19 RX ADMIN — HYDROCODONE BITARTRATE AND ACETAMINOPHEN 1 TABLET: 5; 325 TABLET ORAL at 21:18

## 2023-11-19 RX ADMIN — LISINOPRIL 40 MG: 20 TABLET ORAL at 08:08

## 2023-11-19 RX ADMIN — BUPROPION HYDROCHLORIDE 75 MG: 75 TABLET, FILM COATED ORAL at 08:08

## 2023-11-19 RX ADMIN — BUDESONIDE AND FORMOTEROL FUMARATE DIHYDRATE 2 PUFF: 160; 4.5 AEROSOL RESPIRATORY (INHALATION) at 18:30

## 2023-11-19 RX ADMIN — HEPARIN SODIUM 5000 UNITS: 5000 INJECTION INTRAVENOUS; SUBCUTANEOUS at 21:18

## 2023-11-19 NOTE — PLAN OF CARE
Problem: PAIN - ADULT  Goal: Verbalizes/displays adequate comfort level or baseline comfort level  Description: Interventions:  - Encourage patient to monitor pain and request assistance  - Assess pain using appropriate pain scale  - Administer analgesics based on type and severity of pain and evaluate response  - Implement non-pharmacological measures as appropriate and evaluate response  - Consider cultural and social influences on pain and pain management  - Notify physician/advanced practitioner if interventions unsuccessful or patient reports new pain  Outcome: Progressing     Problem: INFECTION - ADULT  Goal: Absence or prevention of progression during hospitalization  Description: INTERVENTIONS:  - Assess and monitor for signs and symptoms of infection  - Monitor lab/diagnostic results  - Monitor all insertion sites, i.e. indwelling lines, tubes, and drains  - [place] appropriate cooling/warming therapies per order  - Administer medications as ordered  - Instruct and encourage patient and family to use good hand hygiene technique  - Identify and instruct in appropriate isolation precautions for identified infection/condition  Outcome: Progressing     Problem: SAFETY ADULT  Goal: Patient will remain free of falls  Description: INTERVENTIONS:  - Educate patient/family on patient safety including physical limitations  - Instruct patient to call for assistance with activity   - Consult OT/PT to assist with strengthening/mobility   - Keep Call bell within reach  - Keep bed low and locked with side rails adjusted as appropriate  - Keep care items and personal belongings within reach  - Initiate and maintain comfort rounds  - Make Fall Risk Sign visible to staff  - Offer Toileting every 4 Hours, in advance of need  - Initiate/Maintain bed/ chair alarm  - Patient wears shoes when ambulating to the restroom  - Consider moving patient to room near nurses station  Outcome: Progressing     Problem: RESPIRATORY - ADULT  Goal: Achieves optimal ventilation and oxygenation  Description: INTERVENTIONS:  - Assess for changes in respiratory status  - Assess for changes in mentation and behavior  - Position to facilitate oxygenation and minimize respiratory effort  - Oxygen 5 liters via nasal cannula   - Initiate smoking cessation education as indicated  - Encourage broncho-pulmonary hygiene including cough, deep breathe, Incentive Spirometry  - Assess the need for suctioning and aspirate as needed  - Assess and instruct to report SOB or any respiratory difficulty  - Respiratory Therapy support as indicated  Outcome: Progressing

## 2023-11-19 NOTE — RESPIRATORY THERAPY NOTE
RT Protocol Note  Colonel Pierce 69 y.o. female MRN: [id number]  Unit/Bed#: -01 Encounter: [id number]    Assessment    Principal Problem:    Chronic obstructive pulmonary disease with acute exacerbation ([address])  Active Problems:    Essential hypertension    Tobacco abuse    Chronic respiratory failure with hypoxia (HCC)    CAD (coronary artery disease), native coronary artery    Major depressive disorder, recurrent episode, moderate ([address])      Home Pulmonary Medications:     11/18/23 1936   Respiratory Protocol   Protocol Initiated? Yes   Protocol Selection Respiratory   Language Barrier? No   Medical & Social History Reviewed? Yes   Diagnostic Studies Reviewed? Yes   Physical Assessment Performed? Yes   Home Devices/Therapy Home O2   Respiratory Plan Mild Distress pathway   Respiratory Assessment   Assessment Type During-treatment   General Appearance Alert; Awake   Respiratory Pattern Normal   Chest Assessment Chest expansion symmetrical   Bilateral Breath Sounds Diminished;Crackles   Location Specific No   Cough None   Resp Comments Resp protocol complete.  Will continue with current tx plan   O2 Device NC   Cough Description   Sputum Amount None   Additional Assessments   Pulse 90   Respirations 18   SpO2 99 %       Home Devices/Therapy: Home O2    Past Medical History:   Diagnosis Date    Acute congestive heart failure ([address]) 8/27/2021    Anxiety     Cardiac disease     Chest pain 8/27/2021    Chronic pain disorder     COPD (chronic obstructive pulmonary disease) (HCC)     Depression     Heart disease     Hyperlipidemia     Hypertension     MI (myocardial infarction) ([address])     MRSA (methicillin resistant Staphylococcus aureus)     Renal disorder     benign kidney tumor     Social History     Socioeconomic History    Marital status:      Spouse name: None    Number of children: 3    Years of education: 13    Highest education level: High school graduate   Occupational History    Occupation: Reservation      Employer: [employer]     Comment: retired    Tobacco Use    Smoking status: Former     Packs/day: 1.00     Years: 60.00     Total pack years: 60.00     Types: Cigarettes     Quit date: 2016     Years since quittin.8    Smokeless tobacco: Never    Tobacco comments:     She has close to a 60 pack-year smoking history, most recently has cut down to 4-5 cigarettes daily   Vaping Use    Vaping Use: Never used   Substance and Sexual Activity    Alcohol use: Never    Drug use: No    Sexual activity: Not Currently   Other Topics Concern    None   Social History Narrative    None     Social Determinants of Health     Financial Resource Strain: Medium Risk (1/3/2020)    Overall Financial Resource Strain (CARDIA)     Difficulty of Paying Living Expenses: Somewhat hard   Food Insecurity: No Food Insecurity (10/30/2023)    Hunger Vital Sign     Worried About Running Out of Food in the Last Year: Never true     Ran Out of Food in the Last Year: Never true   Transportation Needs: No Transportation Needs (10/30/2023)    PRAPARE - Transportation     Lack of Transportation (Medical): No     Lack of Transportation (Non-Medical): No   Physical Activity: Inactive (1/3/2020)    Exercise Vital Sign     Days of Exercise per Week: 0 days     Minutes of Exercise per Session: 0 min   Stress: No Stress Concern Present (1/3/2020)    [facility]     Feeling of Stress :  Only a little   Social Connections: Socially Isolated (1/3/2020)    Social Connection and Isolation Panel [NHANES]     Frequency of Communication with Friends and Family: Twice a week     Frequency of Social Gatherings with Friends and Family: Once a week     Attends [employer]ist Services: Never     Active Member of Clubs or Organizations: No     Attends Club or Organization Meetings: Never     Marital Status:    Intimate Partner Violence: Not At Risk (1/3/2020)    Humiliation, Afraid, Rape, and Kick questionnaire     Fear of Current or Ex-Partner: No     Emotionally Abused: No     Physically Abused: No     Sexually Abused: No   Housing Stability: Low Risk  (10/30/2023)    Housing Stability Vital Sign     Unable to Pay for Housing in the Last Year: No     Number of Places Lived in the Last Year: 2     Unstable Housing in the Last Year: No       Subjective         Objective    Physical Exam:   Assessment Type: During-treatment  General Appearance: Alert, Awake  Respiratory Pattern: Normal  Chest Assessment: Chest expansion symmetrical  Bilateral Breath Sounds: Diminished, Crackles  Location Specific: No  Cough: None  O2 Device: NC    Vitals:  Blood pressure 138/72, pulse 90, temperature 97.9 °F (36.6 °C), resp. rate 18, height 5' 8" (1.727 m), weight 55.2 kg (121 lb 11.1 oz), SpO2 99 %, not currently breastfeeding. Imaging and other studies: I have personally reviewed pertinent reports. O2 Device: NC     Plan    Respiratory Plan: Mild Distress pathway        Resp Comments: Resp protocol complete.  Will continue with current tx plan

## 2023-11-19 NOTE — ASSESSMENT & PLAN NOTE
On 4 L nasal cannula at baseline.   Patient is currently requiring 5 L we will wean to 4 L  Secondary to COPD

## 2023-11-19 NOTE — RESPIRATORY THERAPY NOTE
RT Protocol Note  Jesus Grant 69 y.o. female MRN: [id number]  Unit/Bed#: -01 Encounter: [id number]    Assessment    Principal Problem:    Chronic obstructive pulmonary disease with acute exacerbation ([address])  Active Problems:    Essential hypertension    Tobacco abuse    Chronic respiratory failure with hypoxia (HCC)    CAD (coronary artery disease), native coronary artery    Major depressive disorder, recurrent episode, moderate (HCC)      Home Pulmonary Medications:  Inhalers/neb    Home Devices/Therapy: Home O2    Past Medical History:   Diagnosis Date    Acute congestive heart failure ([address]) 2021    Anxiety     Cardiac disease     Chest pain 2021    Chronic pain disorder     COPD (chronic obstructive pulmonary disease) (HCC)     Depression     Heart disease     Hyperlipidemia     Hypertension     MI (myocardial infarction) ([address])     MRSA (methicillin resistant Staphylococcus aureus)     Renal disorder     benign kidney tumor     Social History     Socioeconomic History    Marital status:      Spouse name: None    Number of children: 3    Years of education: 13    Highest education level: High school graduate   Occupational History    Occupation:      Employer: [employer]     Comment: retired    Tobacco Use    Smoking status: Former     Packs/day: 1.00     Years: 60.00     Total pack years: 60.00     Types: Cigarettes     Quit date: 2016     Years since quittin.8    Smokeless tobacco: Never    Tobacco comments:     She has close to a 60 pack-year smoking history, most recently has cut down to 4-5 cigarettes daily   Vaping Use    Vaping Use: Never used   Substance and Sexual Activity    Alcohol use: Never    Drug use: No    Sexual activity: Not Currently   Other Topics Concern    None   Social History Narrative    None     Social Determinants of Health     Financial Resource Strain: Medium Risk (1/3/2020)    Overall Financial Resource Strain (CARDIA) Difficulty of Paying Living Expenses: Somewhat hard   Food Insecurity: No Food Insecurity (10/30/2023)    Hunger Vital Sign     Worried About Running Out of Food in the Last Year: Never true     Ran Out of Food in the Last Year: Never true   Transportation Needs: No Transportation Needs (10/30/2023)    PRAPARE - Transportation     Lack of Transportation (Medical): No     Lack of Transportation (Non-Medical): No   Physical Activity: Inactive (1/3/2020)    Exercise Vital Sign     Days of Exercise per Week: 0 days     Minutes of Exercise per Session: 0 min   Stress: No Stress Concern Present (1/3/2020)    [address] [facility]     Feeling of Stress : Only a little   Social Connections: Socially Isolated (1/3/2020)    Social Connection and Isolation Panel [NHANES]     Frequency of Communication with Friends and Family: Twice a week     Frequency of Social Gatherings with Friends and Family: Once a week     Attends Lutheran Services: Never     Active Member of Clubs or Organizations: No     Attends Club or Organization Meetings: Never     Marital Status:    Intimate Partner Violence: Not At Risk (1/3/2020)    Humiliation, Afraid, Rape, and Kick questionnaire     Fear of Current or Ex-Partner: No     Emotionally Abused: No     Physically Abused: No     Sexually Abused: No   Housing Stability: Low Risk  (10/30/2023)    Housing Stability Vital Sign     Unable to Pay for Housing in the Last Year: No     Number of Places Lived in the Last Year: 2     Unstable Housing in the Last Year: No       Subjective         Objective    Physical Exam:   Assessment Type: During-treatment  General Appearance: Awake, Alert  Respiratory Pattern: Normal  Chest Assessment: Chest expansion symmetrical  Bilateral Breath Sounds: Crackles, Scattered, Diminished  Cough: None  O2 Device: NC    Vitals:  Blood pressure 158/100, pulse (P) 99, temperature 98.7 °F (37.1 °C), resp.  rate (P) 18, height 5' 8" (1.727 m), weight 55.2 kg (121 lb 11.1 oz), SpO2 94 %, not currently breastfeeding. Imaging and other studies: I have personally reviewed pertinent reports. O2 Device: NC     Plan    Respiratory Plan: Mild Distress pathway        Resp Comments: Pt resting in bed in no apparent distress. States breathing is getting better. Pt has PMH of COPD and uses inhalers and nebs at home. Will cont w/ edith tx at this time.

## 2023-11-19 NOTE — ASSESSMENT & PLAN NOTE
71-year-old male who initially presented with shortness of breath and wheezing received IV steroids and nebs in the ER and still with significant wheezing  Currently at baseline 4 L nasal cannula  Decrease the steroids to every 8 hours today

## 2023-11-19 NOTE — NURSING NOTE
Pt educated on how to properly use inhaler, pt should be given spacer attachment in D/C as she is forgetful at baseline.

## 2023-11-19 NOTE — PROGRESS NOTES
[address]  Progress Note  Name: Anuel Swenson  MRN: [id number]  Unit/Bed#: -01 I Date of Admission: 11/18/2023   Date of Service: 11/19/2023 I Hospital Day: 1    Assessment/Plan   Major depressive disorder, recurrent episode, moderate (HCC)  Assessment & Plan  Continue bupropion   Mood stable        CAD (coronary artery disease), native coronary artery  Assessment & Plan  Continue carvedilol   Currently without chest pain    Chronic respiratory failure with hypoxia (HCC)  Assessment & Plan  On 4 L nasal cannula at baseline. Patient is currently requiring 5 L we will wean to 4 L  Secondary to COPD    Tobacco abuse  Assessment & Plan  Encourage cessation    Essential hypertension  Assessment & Plan  Continue enalapril, HCTZ, carvedilol, Lasix  BP currently controlled    * Chronic obstructive pulmonary disease with acute exacerbation ([address])  Assessment & Plan  71-year-old male who initially presented with shortness of breath and wheezing received IV steroids and nebs in the ER and still with significant wheezing  Currently at baseline 4 L nasal cannula  Decrease the steroids to every 8 hours today             VTE Pharmacologic Prophylaxis:   Pharmacologic: Heparin  Mechanical VTE Prophylaxis in Place: Yes    Patient Centered Rounds: I have performed bedside rounds with nursing staff today. Time Spent for Care: 20 minutes. More than 50% of total time spent on counseling and coordination of care as described above. Current Length of Stay: 1 day(s)    Current Patient Status: Inpatient   Certification Statement: The patient will continue to require additional inpatient hospital stay due to needing IV steroids    Discharge Plan: Anticipate discharge in the next 24 to 48 hours    Code Status: Level 1 - Full Code      Subjective:   Patient seen and examined. States she is feeling better today. Still little short of breath but improved.   Still short of breath with exertion    Objective:     Vitals:   Temp (24hrs), Av.1 °F (36.7 °C), Min:97.8 °F (36.6 °C), Max:98.7 °F (37.1 °C)    Temp:  [97.8 °F (36.6 °C)-98.7 °F (37.1 °C)] 98.7 °F (37.1 °C)  HR:  [] 99  Resp:  [18-21] 18  BP: (111-158)/() 158/100  SpO2:  [91 %-99 %] 94 %  Body mass index is 18.5 kg/m². Input and Output Summary (last 24 hours): Intake/Output Summary (Last 24 hours) at 2023 1313  Last data filed at 2023 1707  Gross per 24 hour   Intake 290 ml   Output --   Net 290 ml       Physical Exam:     Physical Exam  (   General Appearance:    Alert, cooperative, no distress, appears stated age                               Lungs:   Diffuse expiratory wheezes       Heart:    Regular rate and rhythm, S1 and S2 normal, no murmur, rub    or gallop   Abdomen:     Soft, non-tender, bowel sounds active all four quadrants,     no masses, no organomegaly           Extremities:   Extremities normal, atraumatic, no cyanosis or edema     Additional Data:     Labs:    Results from last 7 days   Lab Units 23  0619   WBC Thousand/uL 4.66   HEMOGLOBIN g/dL 9.1*   HEMATOCRIT % 29.0*   PLATELETS Thousands/uL 177   NEUTROS PCT % 72   LYMPHS PCT % 21   MONOS PCT % 5   EOS PCT % 0     Results from last 7 days   Lab Units 23  0619 23  1230   SODIUM mmol/L 139 137   POTASSIUM mmol/L 3.7 4.1   CHLORIDE mmol/L 94* 95*   CO2 mmol/L 39* 38*   BUN mg/dL 23 17   CREATININE mg/dL 0.87 0.85   ANION GAP mmol/L 6 4   CALCIUM mg/dL 9.1 9.0   ALBUMIN g/dL  --  3.9   TOTAL BILIRUBIN mg/dL  --  0.30   ALK PHOS U/L  --  91   ALT U/L  --  3*   AST U/L  --  13   GLUCOSE RANDOM mg/dL 158* 95                 Results from last 7 days   Lab Units 23  0619   PROCALCITONIN ng/ml <0.05           * I Have Reviewed All Lab Data Listed Above. * Additional Pertinent Lab Tests Reviewed:  All [facility] Admission Reviewed        Recent Cultures (last 7 days):           Last 24 Hours Medication List:   Current Facility-Administered Medications   Medication Dose Route Frequency Provider Last Rate    acetaminophen  650 mg Oral Q6H PRN Jimmy Rosen MD      albuterol  2 puff Inhalation Q6H PRN Jimmy Rosen MD      aspirin  81 mg Oral Daily Jimmy Rosen MD      budesonide-formoterol  2 puff Inhalation BID Jimmy Rosen MD      buPROPion  75 mg Oral Daily Jimmy Rosen MD      carvedilol  25 mg Oral BID With Meals Jimmy Rosen MD      heparin (porcine)  5,000 Units Subcutaneous Q8H [address] Jimmy Rosen MD      hydrochlorothiazide  25 mg Oral Daily Jimmy Rosen MD      HYDROcodone-acetaminophen  1 tablet Oral Q6H PRN Berto Raza MD      ipratropium  0.5 mg Nebulization TID Jimmy Rosen MD      levalbuterol  1.25 mg Nebulization TID Jimmy Rosen MD      lisinopril  40 mg Oral Daily Jimmy Rosen MD      methylPREDNISolone sodium succinate  40 mg Intravenous Q12H [address] Berto Raza MD      pantoprazole  40 mg Oral Daily Jimmy Rosen MD      vilazodone  40 mg Oral Daily With Roxana Blevins MD      Zanubrutinib  160 mg Oral BID Leslie Lilly MD          Today, Patient Was Seen By: Emerson Holder MD    ** Please Note: Dictation voice to text software may have been used in the creation of this document.  **

## 2023-11-20 LAB
ANION GAP SERPL CALCULATED.3IONS-SCNC: 4 MMOL/L
BUN SERPL-MCNC: 24 MG/DL (ref 5–25)
CALCIUM SERPL-MCNC: 9.2 MG/DL (ref 8.4–10.2)
CHLORIDE SERPL-SCNC: 96 MMOL/L (ref 96–108)
CO2 SERPL-SCNC: 39 MMOL/L (ref 21–32)
CREAT SERPL-MCNC: 0.94 MG/DL (ref 0.6–1.3)
GFR SERPL CREATININE-BSD FRML MDRD: 58 ML/MIN/1.73SQ M
GLUCOSE SERPL-MCNC: 128 MG/DL (ref 65–140)
POTASSIUM SERPL-SCNC: 3.7 MMOL/L (ref 3.5–5.3)
SODIUM SERPL-SCNC: 139 MMOL/L (ref 135–147)

## 2023-11-20 PROCEDURE — 99233 SBSQ HOSP IP/OBS HIGH 50: CPT | Performed by: STUDENT IN AN ORGANIZED HEALTH CARE EDUCATION/TRAINING PROGRAM

## 2023-11-20 PROCEDURE — 94664 DEMO&/EVAL PT USE INHALER: CPT

## 2023-11-20 PROCEDURE — 94760 N-INVAS EAR/PLS OXIMETRY 1: CPT

## 2023-11-20 PROCEDURE — 94640 AIRWAY INHALATION TREATMENT: CPT

## 2023-11-20 PROCEDURE — 80048 BASIC METABOLIC PNL TOTAL CA: CPT | Performed by: FAMILY MEDICINE

## 2023-11-20 RX ORDER — PREDNISONE 20 MG/1
40 TABLET ORAL DAILY
Status: DISCONTINUED | OUTPATIENT
Start: 2023-11-21 | End: 2023-11-21 | Stop reason: HOSPADM

## 2023-11-20 RX ORDER — LIDOCAINE 50 MG/G
2 PATCH TOPICAL DAILY
Status: DISCONTINUED | OUTPATIENT
Start: 2023-11-20 | End: 2023-11-21 | Stop reason: HOSPADM

## 2023-11-20 RX ADMIN — HYDROCODONE BITARTRATE AND ACETAMINOPHEN 1 TABLET: 5; 325 TABLET ORAL at 10:23

## 2023-11-20 RX ADMIN — IPRATROPIUM BROMIDE 0.5 MG: 0.5 SOLUTION RESPIRATORY (INHALATION) at 07:21

## 2023-11-20 RX ADMIN — ZANUBRUTINIB 160 MG: 80 CAPSULE, GELATIN COATED ORAL at 10:25

## 2023-11-20 RX ADMIN — HEPARIN SODIUM 5000 UNITS: 5000 INJECTION INTRAVENOUS; SUBCUTANEOUS at 21:29

## 2023-11-20 RX ADMIN — HYDROCHLOROTHIAZIDE 25 MG: 25 TABLET ORAL at 10:19

## 2023-11-20 RX ADMIN — VILAZODONE HYDROCHLORIDE 40 MG: 20 TABLET ORAL at 10:26

## 2023-11-20 RX ADMIN — PANTOPRAZOLE SODIUM 40 MG: 40 TABLET, DELAYED RELEASE ORAL at 10:20

## 2023-11-20 RX ADMIN — HYDROCODONE BITARTRATE AND ACETAMINOPHEN 1 TABLET: 5; 325 TABLET ORAL at 17:58

## 2023-11-20 RX ADMIN — CARVEDILOL 25 MG: 12.5 TABLET, FILM COATED ORAL at 18:02

## 2023-11-20 RX ADMIN — ZANUBRUTINIB 160 MG: 80 CAPSULE, GELATIN COATED ORAL at 17:59

## 2023-11-20 RX ADMIN — LISINOPRIL 40 MG: 20 TABLET ORAL at 10:21

## 2023-11-20 RX ADMIN — BUPROPION HYDROCHLORIDE 75 MG: 75 TABLET, FILM COATED ORAL at 10:19

## 2023-11-20 RX ADMIN — LIDOCAINE 5% 2 PATCH: 700 PATCH TOPICAL at 13:37

## 2023-11-20 RX ADMIN — CARVEDILOL 25 MG: 12.5 TABLET, FILM COATED ORAL at 10:22

## 2023-11-20 RX ADMIN — HEPARIN SODIUM 5000 UNITS: 5000 INJECTION INTRAVENOUS; SUBCUTANEOUS at 13:38

## 2023-11-20 RX ADMIN — BUDESONIDE AND FORMOTEROL FUMARATE DIHYDRATE 2 PUFF: 160; 4.5 AEROSOL RESPIRATORY (INHALATION) at 18:04

## 2023-11-20 RX ADMIN — ASPIRIN 81 MG: 81 TABLET, COATED ORAL at 10:20

## 2023-11-20 RX ADMIN — IPRATROPIUM BROMIDE 0.5 MG: 0.5 SOLUTION RESPIRATORY (INHALATION) at 19:53

## 2023-11-20 RX ADMIN — BUDESONIDE AND FORMOTEROL FUMARATE DIHYDRATE 2 PUFF: 160; 4.5 AEROSOL RESPIRATORY (INHALATION) at 10:26

## 2023-11-20 RX ADMIN — HYDROCODONE BITARTRATE AND ACETAMINOPHEN 1 TABLET: 5; 325 TABLET ORAL at 05:05

## 2023-11-20 RX ADMIN — METHYLPREDNISOLONE SODIUM SUCCINATE 40 MG: 40 INJECTION, POWDER, FOR SOLUTION INTRAMUSCULAR; INTRAVENOUS at 21:29

## 2023-11-20 RX ADMIN — LEVALBUTEROL HYDROCHLORIDE 1.25 MG: 1.25 SOLUTION RESPIRATORY (INHALATION) at 19:53

## 2023-11-20 RX ADMIN — MELATONIN 3 MG: at 21:29

## 2023-11-20 RX ADMIN — METHYLPREDNISOLONE SODIUM SUCCINATE 40 MG: 40 INJECTION, POWDER, FOR SOLUTION INTRAMUSCULAR; INTRAVENOUS at 10:24

## 2023-11-20 RX ADMIN — LEVALBUTEROL HYDROCHLORIDE 1.25 MG: 1.25 SOLUTION RESPIRATORY (INHALATION) at 07:21

## 2023-11-20 NOTE — RESPIRATORY THERAPY NOTE
RT Protocol Note  Hazard Sor 69 y.o. female MRN: [id number]  Unit/Bed#: -01 Encounter: [id number]    Assessment    Principal Problem:    Chronic obstructive pulmonary disease with acute exacerbation ([address])  Active Problems:    Essential hypertension    Tobacco abuse    Chronic respiratory failure with hypoxia (HCC)    CAD (coronary artery disease), native coronary artery    Major depressive disorder, recurrent episode, moderate ([address])      Home Pulmonary Medications:     11/20/23 3316   Respiratory Protocol   Protocol Initiated? No   Protocol Selection Respiratory   Language Barrier? No   Medical & Social History Reviewed? Yes   Diagnostic Studies Reviewed? Yes   Physical Assessment Performed? Yes   Home Devices/Therapy Home O2   Respiratory Plan Mild Distress pathway   Respiratory Assessment   Assessment Type Pre-treatment   General Appearance Alert; Awake   Respiratory Pattern Normal   Chest Assessment Chest expansion symmetrical   Bilateral Breath Sounds Diminished   Resp Comments takes nebs BID at home, continue with home regimen   O2 Device nc   Additional Assessments   SpO2 98 %       Home Devices/Therapy: Home O2    Past Medical History:   Diagnosis Date    Acute congestive heart failure ([address]) 8/27/2021    Anxiety     Cardiac disease     Chest pain 8/27/2021    Chronic pain disorder     COPD (chronic obstructive pulmonary disease) (HCC)     Depression     Heart disease     Hyperlipidemia     Hypertension     MI (myocardial infarction) ([address])     MRSA (methicillin resistant Staphylococcus aureus)     Renal disorder     benign kidney tumor     Social History     Socioeconomic History    Marital status:      Spouse name: None    Number of children: 3    Years of education: 13    Highest education level: High school graduate   Occupational History    Occupation: [employer]     Employer: [employer]     Comment: retired    Tobacco Use    Smoking status: Former     Packs/day: 1.00 Years: 60.00     Total pack years: 60.00     Types: Cigarettes     Quit date: 2016     Years since quittin.8    Smokeless tobacco: Never    Tobacco comments:     She has close to a 60 pack-year smoking history, most recently has cut down to 4-5 cigarettes daily   Vaping Use    Vaping Use: Never used   Substance and Sexual Activity    Alcohol use: Never    Drug use: No    Sexual activity: Not Currently   Other Topics Concern    None   Social History Narrative    None     Social Determinants of Health     Financial Resource Strain: Medium Risk (1/3/2020)    Overall Financial Resource Strain (CARDIA)     Difficulty of Paying Living Expenses: Somewhat hard   Food Insecurity: No Food Insecurity (2023)    Hunger Vital Sign     Worried About Running Out of Food in the Last Year: Never true     Ran Out of Food in the Last Year: Never true   Transportation Needs: No Transportation Needs (2023)    PRAPARE - Transportation     Lack of Transportation (Medical): No     Lack of Transportation (Non-Medical): No   Physical Activity: Inactive (1/3/2020)    Exercise Vital Sign     Days of Exercise per Week: 0 days     Minutes of Exercise per Session: 0 min   Stress: No Stress Concern Present (1/3/2020)    [address] [place] [address]     Feeling of Stress : Only a little   Social Connections: Socially Isolated (1/3/2020)    Social Connection and Isolation Panel [NHANES]     Frequency of Communication with Friends and Family: Twice a week     Frequency of Social Gatherings with Friends and Family: Once a week     Attends Restoration Services: Never     Active Member of Clubs or Organizations: No     Attends Club or Organization Meetings: Never     Marital Status:    Intimate Partner Violence: Not At Risk (1/3/2020)    Humiliation, Afraid, Rape, and Kick questionnaire     Fear of Current or Ex-Partner: No     Emotionally Abused: No     Physically Abused:  No Sexually Abused: No   Housing Stability: Low Risk  (11/20/2023)    Housing Stability Vital Sign     Unable to Pay for Housing in the Last Year: No     Number of Places Lived in the Last Year: 2     Unstable Housing in the Last Year: No       Subjective         Objective    Physical Exam:   Assessment Type: Pre-treatment  General Appearance: Alert, Awake  Respiratory Pattern: Normal  Chest Assessment: Chest expansion symmetrical  Bilateral Breath Sounds: Diminished  O2 Device: nc    Vitals:  Blood pressure 130/66, pulse 94, temperature 98 °F (36.7 °C), resp. rate 16, height 5' 8" (1.727 m), weight 55.2 kg (121 lb 11.1 oz), SpO2 98 %, not currently breastfeeding. Imaging and other studies: I have personally reviewed pertinent reports.       O2 Device: nc     Plan    Respiratory Plan: Mild Distress pathway        Resp Comments: takes nebs BID at home, continue with home regimen

## 2023-11-20 NOTE — PLAN OF CARE
Problem: PAIN - ADULT  Goal: Verbalizes/displays adequate comfort level or baseline comfort level  Description: Interventions:  - Encourage patient to monitor pain and request assistance  - Assess pain using appropriate pain scale  - Administer analgesics based on type and severity of pain and evaluate response  - Implement non-pharmacological measures as appropriate and evaluate response  - Consider cultural and social influences on pain and pain management  - Notify physician/advanced practitioner if interventions unsuccessful or patient reports new pain  Outcome: Progressing     Problem: INFECTION - ADULT  Goal: Absence or prevention of progression during hospitalization  Description: INTERVENTIONS:  - Assess and monitor for signs and symptoms of infection  - Monitor lab/diagnostic results  - Monitor all insertion sites, i.e. indwelling lines, tubes, and drains  - [place] appropriate cooling/warming therapies per order  - Administer medications as ordered  - Instruct and encourage patient and family to use good hand hygiene technique  - Identify and instruct in appropriate isolation precautions for identified infection/condition  Outcome: Progressing  Goal: Absence of fever/infection during neutropenic period  Description: INTERVENTIONS:  - Monitor WBC    Outcome: Progressing     Problem: SAFETY ADULT  Goal: Maintain or return to baseline ADL function  Description: INTERVENTIONS:  -  Assess patient's ability to carry out ADLs; assess patient's baseline for ADL function and identify physical deficits which impact ability to perform ADLs (bathing, care of mouth/teeth, toileting, grooming, dressing, etc.)  - Assess/evaluate cause of self-care deficits   - Assess range of motion  - Assess patient's mobility; develop plan if impaired  - Assess patient's need for assistive devices and provide as appropriate  - Encourage maximum independence but intervene and supervise when necessary  - Involve family in performance of ADLs  - Assess for home care needs following discharge   - Consider OT consult to assist with ADL evaluation and planning for discharge  - Provide patient education as appropriate  Outcome: Progressing     Problem: DISCHARGE PLANNING  Goal: Discharge to home or other facility with appropriate resources  Description: INTERVENTIONS:  - Identify barriers to discharge w/patient and caregiver  - Arrange for needed discharge resources and transportation as appropriate  - Identify discharge learning needs (meds, wound care, etc.)  - Arrange for interpretive services to assist at discharge as needed  - Refer to Case Management Department for coordinating discharge planning if the patient needs post-hospital services based on physician/advanced practitioner order or complex needs related to functional status, cognitive ability, or social support system  Outcome: Progressing     Problem: Knowledge Deficit  Goal: Patient/family/caregiver demonstrates understanding of disease process, treatment plan, medications, and discharge instructions  Description: Complete learning assessment and assess knowledge base.   Interventions:  - Provide teaching at level of understanding  - Provide teaching via preferred learning methods  Outcome: Progressing     Problem: RESPIRATORY - ADULT  Goal: Achieves optimal ventilation and oxygenation  Description: INTERVENTIONS:  - Assess for changes in respiratory status  - Assess for changes in mentation and behavior  - Position to facilitate oxygenation and minimize respiratory effort  - Oxygen 5 liters via nasal cannula   - Initiate smoking cessation education as indicated  - Encourage broncho-pulmonary hygiene including cough, deep breathe, Incentive Spirometry  - Assess the need for suctioning and aspirate as needed  - Assess and instruct to report SOB or any respiratory difficulty  - Respiratory Therapy support as indicated  Outcome: Progressing

## 2023-11-20 NOTE — RESPIRATORY THERAPY NOTE
RT Protocol Note  Orciara Numbers 69 y.o. female MRN: [id number]  Unit/Bed#: -01 Encounter: [id number]    Assessment    Principal Problem:    Chronic obstructive pulmonary disease with acute exacerbation ([address])  Active Problems:    Essential hypertension    Tobacco abuse    Chronic respiratory failure with hypoxia (HCC)    CAD (coronary artery disease), native coronary artery    Major depressive disorder, recurrent episode, moderate ([address])      Home Pulmonary Medications:     11/19/23 1935   Respiratory Protocol   Protocol Initiated? No   Protocol Selection Respiratory   Language Barrier? No   Medical & Social History Reviewed? Yes   Diagnostic Studies Reviewed? Yes   Physical Assessment Performed? Yes   Home Devices/Therapy Home O2   Respiratory Plan Mild Distress pathway   Respiratory Assessment   Assessment Type During-treatment   General Appearance Alert; Awake   Respiratory Pattern Normal   Chest Assessment Chest expansion symmetrical   Bilateral Breath Sounds Crackles   Location Specific No   Cough None   Resp Comments Protocol complete.  Will continue with tx plan   O2 Device NC   Cough Description   Sputum Amount None   Additional Assessments   Pulse (!) 1   Respirations 18   SpO2 98 %       Home Devices/Therapy: Home O2    Past Medical History:   Diagnosis Date    Acute congestive heart failure (HCC) 8/27/2021    Anxiety     Cardiac disease     Chest pain 8/27/2021    Chronic pain disorder     COPD (chronic obstructive pulmonary disease) (HCC)     Depression     Heart disease     Hyperlipidemia     Hypertension     MI (myocardial infarction) ([address])     MRSA (methicillin resistant Staphylococcus aureus)     Renal disorder     benign kidney tumor     Social History     Socioeconomic History    Marital status:      Spouse name: None    Number of children: 3    Years of education: 13    Highest education level: High school graduate   Occupational History    Occupation: [address]      Employer: [employer]     Comment: retired    Tobacco Use    Smoking status: Former     Packs/day: 1.00     Years: 60.00     Total pack years: 60.00     Types: Cigarettes     Quit date: 2016     Years since quittin.8    Smokeless tobacco: Never    Tobacco comments:     She has close to a 60 pack-year smoking history, most recently has cut down to 4-5 cigarettes daily   Vaping Use    Vaping Use: Never used   Substance and Sexual Activity    Alcohol use: Never    Drug use: No    Sexual activity: Not Currently   Other Topics Concern    None   Social History Narrative    None     Social Determinants of Health     Financial Resource Strain: Medium Risk (1/3/2020)    Overall Financial Resource Strain (CARDIA)     Difficulty of Paying Living Expenses: Somewhat hard   Food Insecurity: No Food Insecurity (10/30/2023)    Hunger Vital Sign     Worried About Running Out of Food in the Last Year: Never true     Ran Out of Food in the Last Year: Never true   Transportation Needs: No Transportation Needs (10/30/2023)    PRAPARE - Transportation     Lack of Transportation (Medical): No     Lack of Transportation (Non-Medical): No   Physical Activity: Inactive (1/3/2020)    Exercise Vital Sign     Days of Exercise per Week: 0 days     Minutes of Exercise per Session: 0 min   Stress: No Stress Concern Present (1/3/2020)    [address] [facility]     Feeling of Stress :  Only a little   Social Connections: Socially Isolated (1/3/2020)    Social Connection and Isolation Panel [NHANES]     Frequency of Communication with Friends and Family: Twice a week     Frequency of Social Gatherings with Friends and Family: Once a week     Attends Denominational Services: Never     Active Member of Clubs or Organizations: No     Attends Club or Organization Meetings: Never     Marital Status:    Intimate Partner Violence: Not At Risk (1/3/2020)    Humiliation, Afraid, Rape, and Kick questionnaire     Fear of Current or Ex-Partner: No     Emotionally Abused: No     Physically Abused: No     Sexually Abused: No   Housing Stability: Low Risk  (10/30/2023)    Housing Stability Vital Sign     Unable to Pay for Housing in the Last Year: No     Number of Places Lived in the Last Year: 2     Unstable Housing in the Last Year: No       Subjective         Objective    Physical Exam:   Assessment Type: During-treatment  General Appearance: Alert, Awake  Respiratory Pattern: Normal  Chest Assessment: Chest expansion symmetrical  Bilateral Breath Sounds: Crackles  Location Specific: No  Cough: None  O2 Device: NC    Vitals:  Blood pressure 158/73, pulse (!) 1, temperature 98.2 °F (36.8 °C), resp. rate 18, height 5' 8" (1.727 m), weight 55.2 kg (121 lb 11.1 oz), SpO2 98 %, not currently breastfeeding. Imaging and other studies: I have personally reviewed pertinent reports. O2 Device: NC     Plan    Respiratory Plan: Mild Distress pathway        Resp Comments: Protocol complete.  Will continue with tx plan

## 2023-11-20 NOTE — PROGRESS NOTES
[address]  Progress Note  Name: Bing Wilson  MRN: [id number]  Unit/Bed#: -01 I Date of Admission: 11/18/2023   Date of Service: 11/20/2023 I Hospital Day: 2    Assessment/Plan   Major depressive disorder, recurrent episode, moderate (HCC)  Assessment & Plan  Continue bupropion   Mood stable        CAD (coronary artery disease), native coronary artery  Assessment & Plan  Continue carvedilol   Currently without chest pain    Chronic respiratory failure with hypoxia (HCC)  Assessment & Plan  On 4 L nasal cannula at baseline. Patient is currently requiring 5 L we will wean to 4 L  Secondary to COPD    Tobacco abuse  Assessment & Plan  Encourage cessation    Essential hypertension  Assessment & Plan  Continue enalapril, HCTZ, carvedilol, Lasix  BP currently controlled    * Chronic obstructive pulmonary disease with acute exacerbation ([address])  Assessment & Plan  62-year-old male who initially presented with shortness of breath and wheezing received IV steroids and nebs in the ER and still with significant wheezing  Currently at baseline 4 L nasal cannula  Reduced to IV steroids 40 mg bid, continue for today. Likely taper to po prednisone tomorrow                VTE Pharmacologic Prophylaxis: VTE Score: 6 High Risk (Score >/= 5) - Pharmacological DVT Prophylaxis Ordered: heparin. Sequential Compression Devices Ordered. Mobility:   Basic Mobility Inpatient Raw Score: 17  JH-HLM Goal: 5: Stand one or more mins  JH-HLM Achieved: 7: Walk 25 feet or more  HLM Goal achieved. Continue to encourage appropriate mobility. Patient Centered Rounds: I performed bedside rounds with nursing staff today. Discussions with Specialists or Other Care Team Provider: CM    Education and Discussions with Family / Patient: Updated  (son) via phone. Total Time Spent on Date of Encounter in care of patient: 45 mins.  This time was spent on one or more of the following: performing physical exam; counseling and coordination of care; obtaining or reviewing history; documenting in the medical record; reviewing/ordering tests, medications or procedures; communicating with other healthcare professionals and discussing with patient's family/caregivers. Current Length of Stay: 2 day(s)  Current Patient Status: Inpatient   Certification Statement: The patient will continue to require additional inpatient hospital stay due to IV steroids  Discharge Plan: Anticipate discharge tomorrow to home. Code Status: Level 1 - Full Code    Subjective:   Pt reports improvement in sob. Objective:     Vitals:   Temp (24hrs), Av °F (36.7 °C), Min:97.4 °F (36.3 °C), Max:98.6 °F (37 °C)    Temp:  [97.4 °F (36.3 °C)-98.6 °F (37 °C)] 98.6 °F (37 °C)  HR:  [1-106] 94  Resp:  [15-21] 15  BP: (110-158)/(62-73) 130/65  SpO2:  [98 %-99 %] 98 %  Body mass index is 18.5 kg/m². Input and Output Summary (last 24 hours):   No intake or output data in the 24 hours ending 23 1319    Physical Exam:   Physical Exam  Vitals reviewed. Constitutional:       General: She is not in acute distress. Appearance: She is well-developed. She is not diaphoretic. HENT:      Head: Normocephalic and atraumatic. Nose: Nose normal.      Mouth/Throat:      Pharynx: No oropharyngeal exudate. Eyes:      General: No scleral icterus. Right eye: No discharge. Left eye: No discharge. Conjunctiva/sclera: Conjunctivae normal.   Cardiovascular:      Rate and Rhythm: Normal rate and regular rhythm. Heart sounds: Normal heart sounds. No murmur heard. No friction rub. No gallop. Pulmonary:      Effort: No respiratory distress. Breath sounds: Wheezing present. No rales. Comments: 2L  Abdominal:      General: Bowel sounds are normal. There is no distension. Palpations: Abdomen is soft. Tenderness: There is no abdominal tenderness. There is no guarding.    Musculoskeletal: General: Normal range of motion. Cervical back: Normal range of motion and neck supple. Skin:     General: Skin is warm and dry. Findings: No erythema. Neurological:      Mental Status: She is alert and oriented to person, place, and time. Psychiatric:         Behavior: Behavior normal.         Additional Data:     Labs:  Results from last 7 days   Lab Units 11/19/23  0619   WBC Thousand/uL 4.66   HEMOGLOBIN g/dL 9.1*   HEMATOCRIT % 29.0*   PLATELETS Thousands/uL 177   NEUTROS PCT % 72   LYMPHS PCT % 21   MONOS PCT % 5   EOS PCT % 0     Results from last 7 days   Lab Units 11/20/23  0456 11/19/23  0619 11/18/23  1230   SODIUM mmol/L 139   < > 137   POTASSIUM mmol/L 3.7   < > 4.1   CHLORIDE mmol/L 96   < > 95*   CO2 mmol/L 39*   < > 38*   BUN mg/dL 24   < > 17   CREATININE mg/dL 0.94   < > 0.85   ANION GAP mmol/L 4   < > 4   CALCIUM mg/dL 9.2   < > 9.0   ALBUMIN g/dL  --   --  3.9   TOTAL BILIRUBIN mg/dL  --   --  0.30   ALK PHOS U/L  --   --  91   ALT U/L  --   --  3*   AST U/L  --   --  13   GLUCOSE RANDOM mg/dL 128   < > 95    < > = values in this interval not displayed. Results from last 7 days   Lab Units 11/19/23  0619   PROCALCITONIN ng/ml <0.05       Lines/Drains:  Invasive Devices       Peripheral Intravenous Line  Duration             Peripheral IV 11/18/23 Distal;Dorsal (posterior); Left Forearm 2 days                          Imaging: No pertinent imaging reviewed.     Recent Cultures (last 7 days):         Last 24 Hours Medication List:   Current Facility-Administered Medications   Medication Dose Route Frequency Provider Last Rate    acetaminophen  650 mg Oral Q6H PRN Jimmy Rosen MD      albuterol  2 puff Inhalation Q6H PRN Jimmy Rosen MD      aspirin  81 mg Oral Daily Jimmy Rosen MD      budesonide-formoterol  2 puff Inhalation BID Jimmy Rosen MD      buPROPion  75 mg Oral Daily Jimmy Rosen MD      carvedilol  25 mg Oral BID With John Hall MD heparin (porcine)  5,000 Units Subcutaneous Q8H [address] Jimmy Rosen MD      hydrochlorothiazide  25 mg Oral Daily Jimmy Rosen MD      HYDROcodone-acetaminophen  1 tablet Oral Q6H PRN Berto Navarro MD      ipratropium  0.5 mg Nebulization BID Berto Navarro MD      levalbuterol  1.25 mg Nebulization BID Berto Navarro MD      lidocaine  2 patch Topical Daily Latia Cortes DO      lisinopril  40 mg Oral Daily Jimmy Rosen MD      melatonin  3 mg Oral HS Margot Carlisle PA-C      methylPREDNISolone sodium succinate  40 mg Intravenous Q12H [address] Berto Navarro MD      pantoprazole  40 mg Oral Daily Jimmy Rosen MD      vilazodone  40 mg Oral Daily With Carol Nguyen MD      Zanubrutinib  160 mg Oral BID Veronika Solomon MD          Today, Patient Was Seen By: Chad Hogue DO    **Please Note: This note may have been constructed using a voice recognition system. **

## 2023-11-20 NOTE — PLAN OF CARE
Problem: PAIN - ADULT  Goal: Verbalizes/displays adequate comfort level or baseline comfort level  Description: Interventions:  - Encourage patient to monitor pain and request assistance  - Assess pain using appropriate pain scale  - Administer analgesics based on type and severity of pain and evaluate response  - Implement non-pharmacological measures as appropriate and evaluate response  - Consider cultural and social influences on pain and pain management  - Notify physician/advanced practitioner if interventions unsuccessful or patient reports new pain  Outcome: Progressing     Problem: INFECTION - ADULT  Goal: Absence or prevention of progression during hospitalization  Description: INTERVENTIONS:  - Assess and monitor for signs and symptoms of infection  - Monitor lab/diagnostic results  - Monitor all insertion sites, i.e. indwelling lines, tubes, and drains  - [place] appropriate cooling/warming therapies per order  - Administer medications as ordered  - Instruct and encourage patient and family to use good hand hygiene technique  - Identify and instruct in appropriate isolation precautions for identified infection/condition  Outcome: Progressing  Goal: Absence of fever/infection during neutropenic period  Description: INTERVENTIONS:  - Monitor WBC    Outcome: Progressing     Problem: RESPIRATORY - ADULT  Goal: Achieves optimal ventilation and oxygenation  Description: INTERVENTIONS:  - Assess for changes in respiratory status  - Assess for changes in mentation and behavior  - Position to facilitate oxygenation and minimize respiratory effort  - Oxygen 5 liters via nasal cannula   - Initiate smoking cessation education as indicated  - Encourage broncho-pulmonary hygiene including cough, deep breathe, Incentive Spirometry  - Assess the need for suctioning and aspirate as needed  - Assess and instruct to report SOB or any respiratory difficulty  - Respiratory Therapy support as indicated  Outcome: Progressing     Problem: Knowledge Deficit  Goal: Patient/family/caregiver demonstrates understanding of disease process, treatment plan, medications, and discharge instructions  Description: Complete learning assessment and assess knowledge base.   Interventions:  - Provide teaching at level of understanding  - Provide teaching via preferred learning methods  Outcome: Progressing

## 2023-11-20 NOTE — CASE MANAGEMENT
Case Management Assessment & Discharge Planning Note    Patient name Dodie España  Location [address] [facility] Sabrina 305/-[id number] MRN [id number]  : 1945 Date 2023       Current Admission Date: 2023  Current Admission Diagnosis:Chronic obstructive pulmonary disease with acute exacerbation [facility])   Patient Active Problem List    Diagnosis Date Noted    Diverticulitis 10/29/2023    Moderate protein-calorie malnutrition ([address]) 2023    BRBPR (bright red blood per rectum) 2023    Generalized weakness 2023    Staring episodes 2023    [place] macroglobulinemia ([address]) 2023    Severe protein-calorie malnutrition ([address]) 2021    Positive blood culture 2021    Chronic obstructive pulmonary disease with acute exacerbation ([address]) 2021    SIRS (systemic inflammatory response syndrome) ([address]) 2021    Anemia 2021    Abnormal computed tomography angiography (CTA) 2021    Fatigue 2021    Major depressive disorder, recurrent episode, moderate ([address]) 2019    Flank pain 2019    Pneumonia 2019    Sepsis ([address]) 2019    CAD (coronary artery disease), native coronary artery 2018    Chronic respiratory failure with hypoxia ([address]) 2018    Ambulatory dysfunction 2018    Hypokalemia 2018    Essential hypertension 2018    Tobacco abuse 2018      LOS (days): 2  Geometric Mean LOS (GMLOS) (days): 2.70  Days to GMLOS:0.7     OBJECTIVE:  PATIENT READMITTED TO HOSPITAL  Risk of Unplanned Readmission Score: 32.11         Current admission status: Inpatient       Preferred Pharmacy:   [facility] [address] [address] Amanda Nguyen  [address] [place] [address] Drive  Phone: [phone] Fax: [phone]    [facility], [address] [address]  Phone: [phone] Fax: [phone]    Primary Care Provider: Magdaleno Mohr MD    Primary Insurance: MEDICARE  Secondary Insurance:     ASSESSMENT:  [address], [address] - Son   Primary Phone: [phone] (Home)                           Readmission Root Cause  30 Day Readmission: Yes  Who directed you to return to the hospital?: Self  Did you understand whom to contact if you had questions or problems?: Yes  Did you get your prescriptions before you left the hospital?: Yes  Were you able to get your prescriptions filled when you left the hospital?: Yes  Did you take your medications as prescribed?: Yes  Were you able to get to your follow-up appointments?: Yes  During previous admission, was a post-acute recommendation made?: No  Patient was readmitted due to: copd exacerbation  Action Plan: nebs, inhalers, iv steroids, 5LNC    Patient Information  Admitted from[de-identified] Home  Mental Status: Alert  During Assessment patient was accompanied by: Not accompanied during assessment  Assessment information provided by[de-identified] Patient  Primary Caregiver: Self  Caregiver's Relationship to Patient[de-identified] Family Member  Support Systems: Self, Александр Alexander of Residence: [address] [place] [address] do you live in?: [employer] entry access options.  Select all that apply.: Stairs  Number of steps to enter home.: 4  Do the steps have railings?: Yes  Type of Current Residence: [facility]  Living Arrangements: Lives w/ Son  Is patient a ?: No    Activities of Daily Living Prior to Admission  Functional Status: Independent  Completes ADLs independently?: Yes  Ambulates independently?: No  Level of ambulatory dependence: Assistance  Does patient use assisted devices?: Yes  Assisted Devices (DME) used: Jonh Vivas  Does patient currently own DME?: Yes  What DME does the patient currently own?: Jonh Sangeetha  Does patient have a history of Outpatient Therapy (PT/OT)?: No  Does the patient have a history of Short-Term Rehab?: Yes ([place], does not want to go back there)  Does patient have a history of HHC?: Yes  Does patient currently have [address]?: Yes (cant remember name of agency)    Current Home Health Care  Type of Current Home Care Services: Nurse visit, Home PT, [facility] Provider[de-identified] PCP    Patient Information Continued  Income Source: Pension/senior care  Does patient have prescription coverage?: Yes  Does patient receive dialysis treatments?: No  Does patient have a history of substance abuse?: No  Does patient have a history of Mental Health Diagnosis?: Yes  Is patient receiving treatment for mental health?: Yes  Has patient received inpatient treatment related to mental health in the last 2 years?: No (3 years ago)    [facility] 2/9 Screening   Reviewed PHQ 2/9 Depression Screening Score?: No    Means of Transportation  Means of Transport to Appts[de-identified] Drives Self      Housing Stability: Low Risk  (11/20/2023)    Housing Stability Vital Sign     Unable to Pay for Housing in the Last Year: No     Number of [address] in the Last Year: 2     Unstable Housing in the Last Year: No   Food Insecurity: No Food Insecurity (11/20/2023)    Hunger Vital Sign     Worried About Running Out of Food in the Last Year: Never true     [address] in the Last Year: Never true   Transportation Needs: No Transportation Needs (11/20/2023)    PRAPARE - Transportation     Lack of Transportation (Medical): No     Lack of Transportation (Non-Medical): No   Utilities: Not At Risk (11/20/2023)    [facility] Utilities     Threatened with loss of utilities: No       DISCHARGE DETAILS:    Discharge planning discussed with[de-identified] patient at the bedside  Freedom of Choice: Yes  Comments - Freedom of Choice: FOC maintained in discussion. Alert and oriented. able to make own decisions and encouraged to do so. Patient lives with son who helps her at home, uses a walker to ambulate. IPTA, son drives where needed.  Is open to STR if absolutely necessary but would prefer home with [address] (cannot remember name of company that works with her).  CM contacted family/caregiver?: No- see comments (patient alert and oriented, declined family contact)  Were Treatment Team discharge recommendations reviewed with patient/caregiver?: Yes  Did patient/caregiver verbalize understanding of patient care needs?: Yes  Were patient/caregiver advised of the risks associated with not following Treatment Team discharge recommendations?: Yes    Contacts  Patient Contacts: Telma Hannah  Relationship to Patient[de-identified] Family  Contact Method: Phone  Phone Number: on facesheet  Reason/Outcome: Discharge [facility]         Is the patient interested in [facility] at discharge?: Yes  [facility] requested[de-identified] [address] [facility] [address], Occupational Therapy, Physical [address] [facility] Name[de-identified] Other  HHA External Referral Reason (only applicable if external HHA name selected): Patient has established relationship with provider  [facility] Provider[de-identified] PCP  Home Health Services Needed[de-identified] Strengthening/Theraputic Exercises to Improve Function, Oxygen Via Nasal Cannula, Gait/ADL Training  Oxygen LPM Ordered (if applicable based on home health services needed):: 4 LPM  Homebound Criteria Met[de-identified] Uses an Assist Device (i.e. cane, walker, etc)  Supporting Clincal Findings[de-identified] Requires Oxygen, Dyspnea with Exertion, Fatigues Easliy in [employer], Limited Endurance    DME Referral Provided  Referral made for DME?: No    Other Referral/Resources/Interventions Provided:  Interventions: HHC  Referral Comments: will determine current HHC and refer in Aidin for ADELINE    Would you like to participate in our [facility] service program?  : No - Declined    Treatment Team Recommendation: Home with [facility]  Discharge Destination Plan[de-identified] Home with [address] [facility] [address] at Discharge : Family

## 2023-11-20 NOTE — ASSESSMENT & PLAN NOTE
59-year-old male who initially presented with shortness of breath and wheezing received IV steroids and nebs in the ER and still with significant wheezing  Currently at baseline 4 L nasal cannula  Reduced to IV steroids 40 mg bid, continue for today.  Likely taper to po prednisone tomorrow

## 2023-11-21 VITALS
OXYGEN SATURATION: 95 % | SYSTOLIC BLOOD PRESSURE: 153 MMHG | WEIGHT: 121.69 LBS | HEIGHT: 68 IN | TEMPERATURE: 98 F | BODY MASS INDEX: 18.44 KG/M2 | DIASTOLIC BLOOD PRESSURE: 77 MMHG | RESPIRATION RATE: 17 BRPM | HEART RATE: 88 BPM

## 2023-11-21 LAB
ANION GAP SERPL CALCULATED.3IONS-SCNC: 4 MMOL/L
BUN SERPL-MCNC: 25 MG/DL (ref 5–25)
CALCIUM SERPL-MCNC: 8.9 MG/DL (ref 8.4–10.2)
CHLORIDE SERPL-SCNC: 97 MMOL/L (ref 96–108)
CO2 SERPL-SCNC: 39 MMOL/L (ref 21–32)
CREAT SERPL-MCNC: 0.89 MG/DL (ref 0.6–1.3)
ERYTHROCYTE [DISTWIDTH] IN BLOOD BY AUTOMATED COUNT: 14.1 % (ref 11.6–15.1)
GFR SERPL CREATININE-BSD FRML MDRD: 62 ML/MIN/1.73SQ M
GLUCOSE SERPL-MCNC: 127 MG/DL (ref 65–140)
HCT VFR BLD AUTO: 29.2 % (ref 34.8–46.1)
HGB BLD-MCNC: 8.9 G/DL (ref 11.5–15.4)
MCH RBC QN AUTO: 32.1 PG (ref 26.8–34.3)
MCHC RBC AUTO-ENTMCNC: 30.5 G/DL (ref 31.4–37.4)
MCV RBC AUTO: 105 FL (ref 82–98)
PLATELET # BLD AUTO: 179 THOUSANDS/UL (ref 149–390)
PMV BLD AUTO: 10.4 FL (ref 8.9–12.7)
POTASSIUM SERPL-SCNC: 3.8 MMOL/L (ref 3.5–5.3)
RBC # BLD AUTO: 2.77 MILLION/UL (ref 3.81–5.12)
SODIUM SERPL-SCNC: 140 MMOL/L (ref 135–147)
WBC # BLD AUTO: 4.09 THOUSAND/UL (ref 4.31–10.16)

## 2023-11-21 PROCEDURE — 85027 COMPLETE CBC AUTOMATED: CPT | Performed by: STUDENT IN AN ORGANIZED HEALTH CARE EDUCATION/TRAINING PROGRAM

## 2023-11-21 PROCEDURE — 94640 AIRWAY INHALATION TREATMENT: CPT

## 2023-11-21 PROCEDURE — 99239 HOSP IP/OBS DSCHRG MGMT >30: CPT | Performed by: INTERNAL MEDICINE

## 2023-11-21 PROCEDURE — 94760 N-INVAS EAR/PLS OXIMETRY 1: CPT

## 2023-11-21 PROCEDURE — 94664 DEMO&/EVAL PT USE INHALER: CPT

## 2023-11-21 PROCEDURE — 80048 BASIC METABOLIC PNL TOTAL CA: CPT | Performed by: STUDENT IN AN ORGANIZED HEALTH CARE EDUCATION/TRAINING PROGRAM

## 2023-11-21 RX ORDER — PREDNISONE 20 MG/1
TABLET ORAL
Qty: 11 TABLET | Refills: 0 | Status: SHIPPED | OUTPATIENT
Start: 2023-11-21 | End: 2023-12-01

## 2023-11-21 RX ORDER — LIDOCAINE 50 MG/G
2 PATCH TOPICAL DAILY
Qty: 20 PATCH | Refills: 0 | Status: SHIPPED | OUTPATIENT
Start: 2023-11-21 | End: 2023-12-01

## 2023-11-21 RX ADMIN — LEVALBUTEROL HYDROCHLORIDE 1.25 MG: 1.25 SOLUTION RESPIRATORY (INHALATION) at 08:01

## 2023-11-21 RX ADMIN — CARVEDILOL 25 MG: 12.5 TABLET, FILM COATED ORAL at 09:34

## 2023-11-21 RX ADMIN — IPRATROPIUM BROMIDE 0.5 MG: 0.5 SOLUTION RESPIRATORY (INHALATION) at 08:01

## 2023-11-21 RX ADMIN — BUDESONIDE AND FORMOTEROL FUMARATE DIHYDRATE 2 PUFF: 160; 4.5 AEROSOL RESPIRATORY (INHALATION) at 09:34

## 2023-11-21 RX ADMIN — LISINOPRIL 40 MG: 20 TABLET ORAL at 09:35

## 2023-11-21 RX ADMIN — HYDROCODONE BITARTRATE AND ACETAMINOPHEN 1 TABLET: 5; 325 TABLET ORAL at 00:21

## 2023-11-21 RX ADMIN — HEPARIN SODIUM 5000 UNITS: 5000 INJECTION INTRAVENOUS; SUBCUTANEOUS at 05:17

## 2023-11-21 RX ADMIN — BUPROPION HYDROCHLORIDE 75 MG: 75 TABLET, FILM COATED ORAL at 09:35

## 2023-11-21 RX ADMIN — HYDROCHLOROTHIAZIDE 25 MG: 25 TABLET ORAL at 09:35

## 2023-11-21 RX ADMIN — LIDOCAINE 5% 2 PATCH: 700 PATCH TOPICAL at 09:31

## 2023-11-21 RX ADMIN — VILAZODONE HYDROCHLORIDE 40 MG: 20 TABLET ORAL at 09:40

## 2023-11-21 RX ADMIN — ASPIRIN 81 MG: 81 TABLET, COATED ORAL at 09:34

## 2023-11-21 RX ADMIN — PANTOPRAZOLE SODIUM 40 MG: 40 TABLET, DELAYED RELEASE ORAL at 09:34

## 2023-11-21 RX ADMIN — PREDNISONE 40 MG: 20 TABLET ORAL at 09:35

## 2023-11-21 RX ADMIN — HYDROCODONE BITARTRATE AND ACETAMINOPHEN 1 TABLET: 5; 325 TABLET ORAL at 09:35

## 2023-11-21 RX ADMIN — ZANUBRUTINIB 160 MG: 80 CAPSULE, GELATIN COATED ORAL at 09:33

## 2023-11-21 NOTE — RESPIRATORY THERAPY NOTE
RT Protocol Note  Keisha Pod 69 y.o. female MRN: [id number]  Unit/Bed#: -01 Encounter: [id number]    Assessment    Principal Problem:    Chronic obstructive pulmonary disease with acute exacerbation ([address])  Active Problems:    Essential hypertension    Tobacco abuse    Chronic respiratory failure with hypoxia (HCC)    CAD (coronary artery disease), native coronary artery    Major depressive disorder, recurrent episode, moderate (HCC)  Physical Exam:   Assessment Type: (P) Post-treatment  General Appearance: (P) Awake, Alert  Respiratory Pattern: (P) Normal  Chest Assessment: (P) Chest expansion symmetrical  Bilateral Breath Sounds: (P) Diminished  O2 Device: (P) nc    Vitals:  Blood pressure 153/77, pulse (P) 88, temperature 97.6 °F (36.4 °C), resp. rate (P) 17, height 5' 8" (1.727 m), weight 55.2 kg (121 lb 11.1 oz), SpO2 (P) 95 %, not currently breastfeeding. Imaging and other studies:     O2 Device: (P) nc     Plan    Respiratory Plan: (P) Mild Distress pathway        Resp Comments: (P) no changes at this time, pt up walking around with walker upon entering room. no distress noted at this time BBS diminished. will cont txs as ordered.

## 2023-11-21 NOTE — PLAN OF CARE
Problem: PAIN - ADULT  Goal: Verbalizes/displays adequate comfort level or baseline comfort level  Description: Interventions:  - Encourage patient to monitor pain and request assistance  - Assess pain using appropriate pain scale  - Administer analgesics based on type and severity of pain and evaluate response  - Implement non-pharmacological measures as appropriate and evaluate response  - Consider cultural and social influences on pain and pain management  - Notify physician/advanced practitioner if interventions unsuccessful or patient reports new pain  Outcome: Progressing     Problem: INFECTION - ADULT  Goal: Absence or prevention of progression during hospitalization  Description: INTERVENTIONS:  - Assess and monitor for signs and symptoms of infection  - Monitor lab/diagnostic results  - Monitor all insertion sites, i.e. indwelling lines, tubes, and drains  - [place] appropriate cooling/warming therapies per order  - Administer medications as ordered  - Instruct and encourage patient and family to use good hand hygiene technique  - Identify and instruct in appropriate isolation precautions for identified infection/condition  Outcome: Progressing  Goal: Absence of fever/infection during neutropenic period  Description: INTERVENTIONS:  - Monitor WBC    Outcome: Progressing     Problem: SAFETY ADULT  Goal: Maintain or return to baseline ADL function  Description: INTERVENTIONS:  -  Assess patient's ability to carry out ADLs; assess patient's baseline for ADL function and identify physical deficits which impact ability to perform ADLs (bathing, care of mouth/teeth, toileting, grooming, dressing, etc.)  - Assess/evaluate cause of self-care deficits   - Assess range of motion  - Assess patient's mobility; develop plan if impaired  - Assess patient's need for assistive devices and provide as appropriate  - Encourage maximum independence but intervene and supervise when necessary  - Involve family in performance of ADLs  - Assess for home care needs following discharge   - Consider OT consult to assist with ADL evaluation and planning for discharge  - Provide patient education as appropriate  Outcome: Progressing     Problem: DISCHARGE PLANNING  Goal: Discharge to home or other facility with appropriate resources  Description: INTERVENTIONS:  - Identify barriers to discharge w/patient and caregiver  - Arrange for needed discharge resources and transportation as appropriate  - Identify discharge learning needs (meds, wound care, etc.)  - Arrange for interpretive services to assist at discharge as needed  - Refer to Case Management Department for coordinating discharge planning if the patient needs post-hospital services based on physician/advanced practitioner order or complex needs related to functional status, cognitive ability, or social support system  Outcome: Progressing     Problem: Knowledge Deficit  Goal: Patient/family/caregiver demonstrates understanding of disease process, treatment plan, medications, and discharge instructions  Description: Complete learning assessment and assess knowledge base.   Interventions:  - Provide teaching at level of understanding  - Provide teaching via preferred learning methods  Outcome: Progressing     Problem: RESPIRATORY - ADULT  Goal: Achieves optimal ventilation and oxygenation  Description: INTERVENTIONS:  - Assess for changes in respiratory status  - Assess for changes in mentation and behavior  - Position to facilitate oxygenation and minimize respiratory effort  - Oxygen 5 liters via nasal cannula   - Initiate smoking cessation education as indicated  - Encourage broncho-pulmonary hygiene including cough, deep breathe, Incentive Spirometry  - Assess the need for suctioning and aspirate as needed  - Assess and instruct to report SOB or any respiratory difficulty  - Respiratory Therapy support as indicated  Outcome: Progressing

## 2023-11-21 NOTE — DISCHARGE SUMMARY
[address]  Discharge- Keisha Pod 1945, 69 y.o. female MRN: [id number]  Unit/Bed#: -[facility] Encounter: [id number]  Primary Care Provider: Dionicio Jones MD   Date and time admitted to hospital: 11/18/2023 11:50 AM    Admitting Provider:  Doug Perez MD  Discharge Provider:  Luis Miguel Blanco MD  Admission Date: 11/18/2023       Discharge Date: 11/21/23   LOS: 3  Primary Care Physician at Discharge: Dionicio Jones, [address] COURSE:  Keisha Pod is a 69 y.o. female who presented with significant shortness of breath along with cough and nausea. The patient was diagnosed with COPD acute exacerbation and acute hypoxic respiratory failure as the patient was saturating 80% on 4 L nasal cannula. Patient improved well with intravenous steroids and is being started on oral steroid taper. Patient advised to quit smoking. Patient is being discharged with home oxygen. REASON FOR ADMISSION/ ADMISSION DIAGNOSES    Shortness of breath sec to COPD    DISCHARGE DIAGNOSES    Assessment/Plan   * Chronic obstructive pulmonary disease with acute exacerbation (HCC)  Assessment & Plan  -Pt presented to the ED with SOB, cough and nausea that began prior to admission  -Was saturating 80% on 4L NC per EMS  -Wears 4L NC at baseline; placed on 5L NC due to COPD exacerbation, now weaned back to 4L NC and maintaining O2 saturations above 88%  -IV steroid discontinued yesterday and PO steroid started today  -Will continue PO prednisone post discharge and taper as tolerated     Major depressive disorder, recurrent episode, moderate (HCC)  Assessment & Plan  -Continue home viibryd and wellbutrin  -Mood stable     CAD (coronary artery disease), native coronary artery  Assessment & Plan  -Continue home aspirin, carvedilol     Chronic respiratory failure with hypoxia ([address])  Assessment & Plan  -Secondary to COPD  -On 4L NC at baseline.  Pt weaned from 5L NC back to baseline Tobacco abuse  Assessment & Plan  -Smoking cessation education     Essential hypertension  Assessment & Plan  -BP stable throughout hospitalization  -Continue enalapril, HCTZ, carvedilol, lasix    CONSULTING PROVIDERS   None    PROCEDURES PERFORMED  * No surgery found *    RADIOLOGY RESULTS  XR chest 2 views    Result Date: 11/19/2023  Impression: No acute cardiopulmonary disease.  Stable chest radiograph Workstation performed: [id number]       LABS  Results from last 7 days   Lab Units 11/21/23  0505 11/19/23  0619 11/18/23  1230   WBC Thousand/uL 4.09* 4.66 7.58   HEMOGLOBIN g/dL 8.9* 9.1* 9.3*   HEMATOCRIT % 29.2* 29.0* 30.8*   MCV fL 105* 104* 107*   PLATELETS Thousands/uL 179 177 169     Results from last 7 days   Lab Units 11/21/23  0505 11/20/23  0456 11/19/23 0619 11/18/23  1230   SODIUM mmol/L 140 139 139 137   POTASSIUM mmol/L 3.8 3.7 3.7 4.1   CHLORIDE mmol/L 97 96 94* 95*   CO2 mmol/L 39* 39* 39* 38*   BUN mg/dL 25 24 23 17   CREATININE mg/dL 0.89 0.94 0.87 0.85   CALCIUM mg/dL 8.9 9.2 9.1 9.0   ALBUMIN g/dL  --   --   --  3.9   TOTAL BILIRUBIN mg/dL  --   --   --  0.30   ALK PHOS U/L  --   --   --  91   ALT U/L  --   --   --  3*   AST U/L  --   --   --  13   EGFR ml/min/1.73sq m 62 58 64 66   GLUCOSE RANDOM mg/dL 127 128 158* 95                              Results from last 7 days   Lab Units 11/19/23  0619   PROCALCITONIN ng/ml <0.05           Cultures:                PHYSICAL EXAM:  Vitals:   Blood Pressure: 153/77 (11/21/23 0739)  Pulse: 88 (11/21/23 0816)  Temperature: 98 °F (36.7 °C) (11/21/23 1414)  Temp Source: Oral (11/20/23 2209)  Respirations: 17 (11/21/23 0816)  Height: 5' 8" (172.7 cm) (11/18/23 1539)  Weight - Scale: 55.2 kg (121 lb 11.1 oz) (11/18/23 1539)  SpO2: 95 % (11/21/23 0816)    General appearance: alert, fatigued, appears stated age, and cooperative  HEENT - atraumatic and normocephalic  Neck- supple  Skin - no fresh rash  Extremities no fresh focal deformities  Cardiovascular- S1-S2 heard  Respiratory- bilateral air entry present, no crackles or rhonchi  Skin - no fresh rash  Abdomen - normal bowel sounds present, no rebound tenderness  CNS- No fresh focal deficits  Psych- no acute psychosis     Planned Re-admission: No  Discharge Disposition: Home with Home Health Care      Test Results Pending at Discharge:   Incidental findings: None    Medications   Summary of Medication Adjustments made as a result of this hospitalization: Steroids being tapered  Medication Dosing Tapers - Please refer to Discharge Medication List for details on any medication dosing tapers (if applicable to patient). Discharge Medication List: See after visit summary for reconciled discharge medications. Diet restrictions: None       Diet Orders   (From admission, onward)                 Start     Ordered    11/18/23 1420  Diet Regular; Regular House  Diet effective now        References:    Adult Nutrition Support Algorithm    RD Therapeutic Diet Order Protocol   Question Answer Comment   Diet Type Regular    Regular Regular House    RD to adjust diet per protocol? Yes        11/18/23 1421                  Activity restrictions: No strenuous activity  Discharge Condition: stable    Outpatient Follow-Up and Discharge Instructions  See after visit summary section titled Discharge Instructions for information provided to patient and family. Code Status: Level 1 - Full Code  Discharge Statement   I spent 35 minutes discharging the patient. This time was spent on the day of discharge. Greater than 50% of total time was spent with the patient and / or family counseling and / or coordination of care. Olvin Flaherty MD  [facility]    ** Please Note: This note has been constructed using a voice recognition system.  **

## 2023-11-21 NOTE — CASE MANAGEMENT
Case Management Discharge Planning Note    Patient name Jeremi Spaulding  Location [address] [facility] [place] 305/-[id number] MRN [id number]  : 1945 Date 2023       Current Admission Date: 2023  Current Admission Diagnosis:Chronic obstructive pulmonary disease with acute exacerbation [facility])   Patient Active Problem List    Diagnosis Date Noted    Diverticulitis 10/29/2023    Moderate protein-calorie malnutrition ([address]) 2023    BRBPR (bright red blood per rectum) 2023    Generalized weakness 2023    Staring episodes 2023    Waldenstrom macroglobulinemia ([address]) 2023    Severe protein-calorie malnutrition ([address]) 2021    Positive blood culture 2021    Chronic obstructive pulmonary disease with acute exacerbation ([address]) 2021    SIRS (systemic inflammatory response syndrome) ([address]) 2021    Anemia 2021    Abnormal computed tomography angiography (CTA) 2021    Fatigue 2021    Major depressive disorder, recurrent episode, moderate ([address]) 2019    Flank pain 2019    Pneumonia 2019    Sepsis ([address]) 2019    CAD (coronary artery disease), native coronary artery 2018    Chronic respiratory failure with hypoxia ([address]) 2018    Ambulatory dysfunction 2018    Hypokalemia 2018    Essential hypertension 2018    Tobacco abuse 2018      LOS (days): 3  Geometric Mean LOS (GMLOS) (days): 2.70  Days to GMLOS:-0.2     OBJECTIVE:  Risk of Unplanned Readmission Score: 32.7         Current admission status: Inpatient   Preferred Pharmacy:   [id number] [address] [address]  [address] [place] [address]  Phone: [phone] Fax: [phone], [address]  [address] [address] [address] [address]  Phone: [phone] Fax: [phone]    Primary Care Provider: Mario Kat MD    Primary Insurance: MEDICARE  Secondary Insurance: DISCHARGE DETAILS:                                                                                        IMM Given (Date):: 11/21/23  IMM Given to[de-identified] Patient     Additional Comments: IMM & Medicare rights reviewed with patient at the bedside. Patient verbalized understanding and signed original, filed to medical records.  Copy given to patient

## 2023-11-21 NOTE — PLAN OF CARE
Problem: SAFETY ADULT  Goal: Maintain or return to baseline ADL function  Description: INTERVENTIONS:  -  Assess patient's ability to carry out ADLs; assess patient's baseline for ADL function and identify physical deficits which impact ability to perform ADLs (bathing, care of mouth/teeth, toileting, grooming, dressing, etc.)  - Assess/evaluate cause of self-care deficits   - Assess range of motion  - Assess patient's mobility; develop plan if impaired  - Assess patient's need for assistive devices and provide as appropriate  - Encourage maximum independence but intervene and supervise when necessary  - Involve family in performance of ADLs  - Assess for home care needs following discharge   - Consider OT consult to assist with ADL evaluation and planning for discharge  - Provide patient education as appropriate  11/21/2023 0804 by Amanda Landry RN  Outcome: Progressing  11/21/2023 0803 by Amanda Landry RN  Outcome: Progressing     Problem: DISCHARGE PLANNING  Goal: Discharge to home or other facility with appropriate resources  Description: INTERVENTIONS:  - Identify barriers to discharge w/patient and caregiver  - Arrange for needed discharge resources and transportation as appropriate  - Identify discharge learning needs (meds, wound care, etc.)  - Arrange for interpretive services to assist at discharge as needed  - Refer to Case Management Department for coordinating discharge planning if the patient needs post-hospital services based on physician/advanced practitioner order or complex needs related to functional status, cognitive ability, or social support system  11/21/2023 0804 by Amanda Landry RN  Outcome: Progressing  11/21/2023 0803 by Amanda Landry RN  Outcome: Progressing     Problem: Knowledge Deficit  Goal: Patient/family/caregiver demonstrates understanding of disease process, treatment plan, medications, and discharge instructions  Description: Complete learning assessment and assess knowledge base.   Interventions:  - Provide teaching at level of understanding  - Provide teaching via preferred learning methods  11/21/2023 0804 by Ender Zamarripa RN  Outcome: Progressing  11/21/2023 0803 by Ender Zamarripa RN  Outcome: Progressing     Problem: RESPIRATORY - ADULT  Goal: Achieves optimal ventilation and oxygenation  Description: INTERVENTIONS:  - Assess for changes in respiratory status  - Assess for changes in mentation and behavior  - Position to facilitate oxygenation and minimize respiratory effort  - Oxygen 5 liters via nasal cannula   - Initiate smoking cessation education as indicated  - Encourage broncho-pulmonary hygiene including cough, deep breathe, Incentive Spirometry  - Assess the need for suctioning and aspirate as needed  - Assess and instruct to report SOB or any respiratory difficulty  - Respiratory Therapy support as indicated  11/21/2023 0804 by Ender Zamarripa RN  Outcome: Progressing  11/21/2023 0803 by Ender Zamarripa RN  Outcome: Progressing

## 2023-11-21 NOTE — PROGRESS NOTES
[address]  Progress Note  Name: Cali Pritchard  MRN: [id number]  Unit/Bed#: -01 I Date of Admission: 11/18/2023   Date of Service: 11/21/2023 I Hospital Day: 3    Assessment/Plan   * Chronic obstructive pulmonary disease with acute exacerbation (HCC)  Assessment & Plan  -Pt presented to the ED with SOB, cough and nausea that began prior to admission  -Was saturating 80% on 4L NC per EMS  -Wears 4L NC at baseline; placed on 5L NC due to COPD exacerbation, now weaned back to 4L NC and maintaining O2 saturations above 88%  -IV steroid discontinued yesterday and PO steroid started today  -Will continue PO prednisone post discharge and taper as tolerated    Major depressive disorder, recurrent episode, moderate (HCC)  Assessment & Plan  -Continue home viibryd and wellbutrin  -Mood stable    CAD (coronary artery disease), native coronary artery  Assessment & Plan  -Continue home aspirin, carvedilol    Chronic respiratory failure with hypoxia ([address])  Assessment & Plan  -Secondary to COPD  -On 4L NC at baseline. Pt weaned from 5L NC back to baseline    Tobacco abuse  Assessment & Plan  -Smoking cessation education    Essential hypertension  Assessment & Plan  -BP stable throughout hospitalization  -Continue enalapril, HCTZ, carvedilol, lasix    VTE Pharmacologic Prophylaxis: VTE Score: 6 High Risk (Score >/= 5) - Pharmacological DVT Prophylaxis Ordered: heparin. Sequential Compression Devices Ordered. Mobility:   Basic Mobility Inpatient Raw Score: 22  JH-HLM Goal: 7: Walk 25 feet or more  JH-HLM Achieved: 7: Walk 25 feet or more  HLM Goal achieved. Continue to encourage appropriate mobility. Patient Centered Rounds: I performed bedside rounds with nursing staff today. Discussions with Specialists or Other Care Team Provider: n/a    Education and Discussions with Family / Patient:  n/a. Total Time Spent on Date of Encounter in care of patient: 20 mins.  This time was spent on one or more of the following: performing physical exam; counseling and coordination of care; obtaining or reviewing history; documenting in the medical record; reviewing/ordering tests, medications or procedures; communicating with other healthcare professionals and discussing with patient's family/caregivers. Current Length of Stay: 3 day(s)  Current Patient Status: Inpatient   Certification Statement:  Planned discharge today  Discharge Plan: Anticipate discharge later today or tomorrow to home. Code Status: Level 1 - Full Code    Subjective:   Pt examined at bedside. Doing well with no acute complaints. Weaned back to baseline 4L NC and tolerating it. Would like to go home today. Objective:     Vitals:   Temp (24hrs), Av.8 °F (36.6 °C), Min:97.6 °F (36.4 °C), Max:98 °F (36.7 °C)    Temp:  [97.6 °F (36.4 °C)-98 °F (36.7 °C)] 97.6 °F (36.4 °C)  HR:  [12-91] 88  Resp:  [16-20] 17  BP: (128-153)/(66-77) 153/77  SpO2:  [95 %-98 %] 95 %  Body mass index is 18.5 kg/m². Input and Output Summary (last 24 hours): Intake/Output Summary (Last 24 hours) at 2023 1157  Last data filed at 2023 0900  Gross per 24 hour   Intake 240 ml   Output 500 ml   Net -260 ml       Physical Exam:   Physical Exam  Constitutional:       Appearance: Normal appearance. HENT:      Head: Normocephalic and atraumatic. Mouth/Throat:      Mouth: Mucous membranes are moist.      Pharynx: No oropharyngeal exudate or posterior oropharyngeal erythema. Cardiovascular:      Rate and Rhythm: Normal rate and regular rhythm. Pulses: Normal pulses. Heart sounds: Normal heart sounds. Pulmonary:      Effort: Pulmonary effort is normal.      Breath sounds: Normal breath sounds. No wheezing. Abdominal:      General: Bowel sounds are normal. There is no distension. Palpations: Abdomen is soft. Tenderness: There is no abdominal tenderness. Musculoskeletal:         General: No swelling.  Normal range of motion. Skin:     General: Skin is warm and dry. Neurological:      General: No focal deficit present. Mental Status: She is alert and oriented to person, place, and time. Mental status is at baseline. Psychiatric:         Mood and Affect: Mood normal.       Additional Data:     Labs:  Results from last 7 days   Lab Units 11/21/23  0505 11/19/23  0619   WBC Thousand/uL 4.09* 4.66   HEMOGLOBIN g/dL 8.9* 9.1*   HEMATOCRIT % 29.2* 29.0*   PLATELETS Thousands/uL 179 177   NEUTROS PCT %  --  72   LYMPHS PCT %  --  21   MONOS PCT %  --  5   EOS PCT %  --  0     Results from last 7 days   Lab Units 11/21/23  0505 11/19/23  0619 11/18/23  1230   SODIUM mmol/L 140   < > 137   POTASSIUM mmol/L 3.8   < > 4.1   CHLORIDE mmol/L 97   < > 95*   CO2 mmol/L 39*   < > 38*   BUN mg/dL 25   < > 17   CREATININE mg/dL 0.89   < > 0.85   ANION GAP mmol/L 4   < > 4   CALCIUM mg/dL 8.9   < > 9.0   ALBUMIN g/dL  --   --  3.9   TOTAL BILIRUBIN mg/dL  --   --  0.30   ALK PHOS U/L  --   --  91   ALT U/L  --   --  3*   AST U/L  --   --  13   GLUCOSE RANDOM mg/dL 127   < > 95    < > = values in this interval not displayed. Results from last 7 days   Lab Units 11/19/23  0619   PROCALCITONIN ng/ml <0.05       Lines/Drains:  Invasive Devices       Peripheral Intravenous Line  Duration             Peripheral IV 11/18/23 Distal;Dorsal (posterior); Left Forearm 3 days                          Imaging: No pertinent imaging reviewed.     Recent Cultures (last 7 days):         Last 24 Hours Medication List:   Current Facility-Administered Medications   Medication Dose Route Frequency Provider Last Rate    acetaminophen  650 mg Oral Q6H PRN Jimmy Rosen MD      albuterol  2 puff Inhalation Q6H PRN Jimmy Rosen MD      aspirin  81 mg Oral Daily Jimmy Rosen MD      budesonide-formoterol  2 puff Inhalation BID Jimmy Rosen MD      buPROPion  75 mg Oral Daily Jimmy Rosen MD      carvedilol  25 mg Oral BID With Meals Jimmy Rosen MD      heparin (porcine)  5,000 Units Subcutaneous Q8H [address] Jimmy Rosen MD      hydrochlorothiazide  25 mg Oral Daily Jimmy Rosen MD      HYDROcodone-acetaminophen  1 tablet Oral Q6H PRN Berto Mercedes MD      ipratropium  0.5 mg Nebulization BID Berto Mercedes MD      levalbuterol  1.25 mg Nebulization BID Berto Mercedes MD      lidocaine  2 patch Topical Daily Latiajayson Cortes,       lisinopril  40 mg Oral Daily Jimmy Rosen MD      melatonin  3 mg Oral HS Margot Carlisle PA-C      pantoprazole  40 mg Oral Daily Jimmy Rosen MD      predniSONE  40 mg Oral Daily Latiajayson Cortes,       vilazodone  40 mg Oral Daily With Breakfast Jimmy Rosen MD      Zanubrutinib  160 mg Oral BID Carina Hernadez MD          Today, Patient Was Seen By: Liat Matos    **Please Note: This note may have been constructed using a voice recognition system. **

## 2023-11-21 NOTE — ASSESSMENT & PLAN NOTE
-Pt presented to the ED with SOB, cough and nausea that began prior to admission  -Was saturating 80% on 4L NC per EMS  -Wears 4L NC at baseline; placed on 5L NC due to COPD exacerbation, now weaned back to 4L NC and maintaining O2 saturations above 88%  -On 40mg PO prednisone  -Will continue PO prednisone post discharge and taper as tolerated

## 2023-12-12 ENCOUNTER — APPOINTMENT (EMERGENCY)
Dept: RADIOLOGY | Facility: HOSPITAL | Age: 78
DRG: 189 | End: 2023-12-12
Payer: MEDICARE

## 2023-12-12 ENCOUNTER — HOSPITAL ENCOUNTER (INPATIENT)
Facility: HOSPITAL | Age: 78
LOS: 3 days | Discharge: HOME HEALTH | DRG: 189 | End: 2023-12-15
Attending: EMERGENCY MEDICINE | Admitting: STUDENT IN AN ORGANIZED HEALTH CARE EDUCATION/TRAINING PROGRAM
Payer: MEDICARE

## 2023-12-12 DIAGNOSIS — E87.29 RESPIRATORY ACIDOSIS: ICD-10-CM

## 2023-12-12 DIAGNOSIS — J44.1 COPD WITH ACUTE EXACERBATION (HCC): Primary | ICD-10-CM

## 2023-12-12 DIAGNOSIS — J44.1 COPD EXACERBATION (HCC): ICD-10-CM

## 2023-12-12 DIAGNOSIS — H81.10 BPPV (BENIGN PAROXYSMAL POSITIONAL VERTIGO): ICD-10-CM

## 2023-12-12 PROBLEM — J96.21 ACUTE ON CHRONIC RESPIRATORY FAILURE WITH HYPOXIA AND HYPERCAPNIA (HCC): Status: ACTIVE | Noted: 2023-12-12

## 2023-12-12 PROBLEM — J96.22 ACUTE ON CHRONIC RESPIRATORY FAILURE WITH HYPOXIA AND HYPERCAPNIA (HCC): Status: ACTIVE | Noted: 2023-12-12

## 2023-12-12 LAB
2HR DELTA HS TROPONIN: 0 NG/L
4HR DELTA HS TROPONIN: 0 NG/L
ALBUMIN SERPL BCP-MCNC: 4.2 G/DL (ref 3.5–5)
ALP SERPL-CCNC: 141 U/L (ref 34–104)
ALT SERPL W P-5'-P-CCNC: 4 U/L (ref 7–52)
APTT PPP: 28 SECONDS (ref 23–37)
AST SERPL W P-5'-P-CCNC: 13 U/L (ref 13–39)
BASE EX.OXY STD BLDV CALC-SCNC: 61.1 % (ref 60–80)
BASE EX.OXY STD BLDV CALC-SCNC: 88.6 % (ref 60–80)
BASE EXCESS BLDV CALC-SCNC: 11.7 MMOL/L
BASE EXCESS BLDV CALC-SCNC: 19.9 MMOL/L
BASOPHILS # BLD AUTO: 0.03 THOUSANDS/ÂΜL (ref 0–0.1)
BASOPHILS NFR BLD AUTO: 0 % (ref 0–1)
BILIRUB SERPL-MCNC: 0.36 MG/DL (ref 0.2–1)
BNP SERPL-MCNC: 58 PG/ML (ref 0–100)
BUN SERPL-MCNC: 19 MG/DL (ref 5–25)
CALCIUM SERPL-MCNC: 10.2 MG/DL (ref 8.4–10.2)
CARDIAC TROPONIN I PNL SERPL HS: 7 NG/L
CHLORIDE SERPL-SCNC: 93 MMOL/L (ref 96–108)
CO2 SERPL-SCNC: >45 MMOL/L (ref 21–32)
CREAT SERPL-MCNC: 0.91 MG/DL (ref 0.6–1.3)
EOSINOPHIL # BLD AUTO: 0.14 THOUSAND/ÂΜL (ref 0–0.61)
EOSINOPHIL NFR BLD AUTO: 1 % (ref 0–6)
ERYTHROCYTE [DISTWIDTH] IN BLOOD BY AUTOMATED COUNT: 12.6 % (ref 11.6–15.1)
FLUAV RNA RESP QL NAA+PROBE: NEGATIVE
FLUBV RNA RESP QL NAA+PROBE: NEGATIVE
GFR SERPL CREATININE-BSD FRML MDRD: 61 ML/MIN/1.73SQ M
GLUCOSE SERPL-MCNC: 153 MG/DL (ref 65–140)
HCO3 BLDV-SCNC: 39.9 MMOL/L (ref 24–30)
HCO3 BLDV-SCNC: 53.3 MMOL/L (ref 24–30)
HCT VFR BLD AUTO: 38.8 % (ref 34.8–46.1)
HGB BLD-MCNC: 12 G/DL (ref 11.5–15.4)
IMM GRANULOCYTES # BLD AUTO: 0.16 THOUSAND/UL (ref 0–0.2)
IMM GRANULOCYTES NFR BLD AUTO: 2 % (ref 0–2)
INR PPP: 0.95 (ref 0.84–1.19)
LACTATE SERPL-SCNC: 1 MMOL/L (ref 0.5–2)
LYMPHOCYTES # BLD AUTO: 1.53 THOUSANDS/ÂΜL (ref 0.6–4.47)
LYMPHOCYTES NFR BLD AUTO: 15 % (ref 14–44)
MAGNESIUM SERPL-MCNC: 1.7 MG/DL (ref 1.9–2.7)
MCH RBC QN AUTO: 32.4 PG (ref 26.8–34.3)
MCHC RBC AUTO-ENTMCNC: 30.9 G/DL (ref 31.4–37.4)
MCV RBC AUTO: 105 FL (ref 82–98)
MONOCYTES # BLD AUTO: 0.33 THOUSAND/ÂΜL (ref 0.17–1.22)
MONOCYTES NFR BLD AUTO: 3 % (ref 4–12)
NEUTROPHILS # BLD AUTO: 8.04 THOUSANDS/ÂΜL (ref 1.85–7.62)
NEUTS SEG NFR BLD AUTO: 79 % (ref 43–75)
NRBC BLD AUTO-RTO: 0 /100 WBCS
O2 CT BLDV-SCNC: 10.6 ML/DL
O2 CT BLDV-SCNC: 15.5 ML/DL
PCO2 BLDV: 131.7 MM HG (ref 42–50)
PCO2 BLDV: 71.5 MM HG (ref 42–50)
PH BLDV: 7.22 [PH] (ref 7.3–7.4)
PH BLDV: 7.36 [PH] (ref 7.3–7.4)
PLATELET # BLD AUTO: 239 THOUSANDS/UL (ref 149–390)
PMV BLD AUTO: 9.7 FL (ref 8.9–12.7)
PO2 BLDV: 36.2 MM HG (ref 35–45)
PO2 BLDV: 61.3 MM HG (ref 35–45)
POTASSIUM SERPL-SCNC: 4.5 MMOL/L (ref 3.5–5.3)
PROCALCITONIN SERPL-MCNC: <0.05 NG/ML
PROT SERPL-MCNC: 7.2 G/DL (ref 6.4–8.4)
PROTHROMBIN TIME: 13.3 SECONDS (ref 11.6–14.5)
RBC # BLD AUTO: 3.7 MILLION/UL (ref 3.81–5.12)
RSV RNA RESP QL NAA+PROBE: NEGATIVE
SARS-COV-2 RNA RESP QL NAA+PROBE: NEGATIVE
SODIUM SERPL-SCNC: 143 MMOL/L (ref 135–147)
WBC # BLD AUTO: 10.23 THOUSAND/UL (ref 4.31–10.16)

## 2023-12-12 PROCEDURE — 96375 TX/PRO/DX INJ NEW DRUG ADDON: CPT

## 2023-12-12 PROCEDURE — 94002 VENT MGMT INPAT INIT DAY: CPT

## 2023-12-12 PROCEDURE — 82805 BLOOD GASES W/O2 SATURATION: CPT | Performed by: PHYSICIAN ASSISTANT

## 2023-12-12 PROCEDURE — 83880 ASSAY OF NATRIURETIC PEPTIDE: CPT

## 2023-12-12 PROCEDURE — 85610 PROTHROMBIN TIME: CPT | Performed by: PHYSICIAN ASSISTANT

## 2023-12-12 PROCEDURE — 0241U HB NFCT DS VIR RESP RNA 4 TRGT: CPT | Performed by: PHYSICIAN ASSISTANT

## 2023-12-12 PROCEDURE — 99285 EMERGENCY DEPT VISIT HI MDM: CPT | Performed by: PHYSICIAN ASSISTANT

## 2023-12-12 PROCEDURE — 99223 1ST HOSP IP/OBS HIGH 75: CPT | Performed by: INTERNAL MEDICINE

## 2023-12-12 PROCEDURE — 84145 PROCALCITONIN (PCT): CPT | Performed by: INTERNAL MEDICINE

## 2023-12-12 PROCEDURE — 80053 COMPREHEN METABOLIC PANEL: CPT

## 2023-12-12 PROCEDURE — 85025 COMPLETE CBC W/AUTO DIFF WBC: CPT

## 2023-12-12 PROCEDURE — 84484 ASSAY OF TROPONIN QUANT: CPT

## 2023-12-12 PROCEDURE — 83735 ASSAY OF MAGNESIUM: CPT | Performed by: PHYSICIAN ASSISTANT

## 2023-12-12 PROCEDURE — 85730 THROMBOPLASTIN TIME PARTIAL: CPT | Performed by: PHYSICIAN ASSISTANT

## 2023-12-12 PROCEDURE — 71045 X-RAY EXAM CHEST 1 VIEW: CPT

## 2023-12-12 PROCEDURE — 94760 N-INVAS EAR/PLS OXIMETRY 1: CPT

## 2023-12-12 PROCEDURE — 96366 THER/PROPH/DIAG IV INF ADDON: CPT

## 2023-12-12 PROCEDURE — 99285 EMERGENCY DEPT VISIT HI MDM: CPT

## 2023-12-12 PROCEDURE — 96365 THER/PROPH/DIAG IV INF INIT: CPT

## 2023-12-12 PROCEDURE — 83605 ASSAY OF LACTIC ACID: CPT | Performed by: INTERNAL MEDICINE

## 2023-12-12 PROCEDURE — 87040 BLOOD CULTURE FOR BACTERIA: CPT | Performed by: PHYSICIAN ASSISTANT

## 2023-12-12 PROCEDURE — 84484 ASSAY OF TROPONIN QUANT: CPT | Performed by: INTERNAL MEDICINE

## 2023-12-12 PROCEDURE — 93005 ELECTROCARDIOGRAM TRACING: CPT

## 2023-12-12 PROCEDURE — 36415 COLL VENOUS BLD VENIPUNCTURE: CPT

## 2023-12-12 RX ORDER — ALBUTEROL SULFATE 90 UG/1
2 AEROSOL, METERED RESPIRATORY (INHALATION) EVERY 6 HOURS PRN
Status: DISCONTINUED | OUTPATIENT
Start: 2023-12-12 | End: 2023-12-15 | Stop reason: HOSPADM

## 2023-12-12 RX ORDER — CEFTRIAXONE 1 G/50ML
1000 INJECTION, SOLUTION INTRAVENOUS EVERY 24 HOURS
Status: DISCONTINUED | OUTPATIENT
Start: 2023-12-12 | End: 2023-12-14

## 2023-12-12 RX ORDER — MAGNESIUM SULFATE HEPTAHYDRATE 40 MG/ML
2 INJECTION, SOLUTION INTRAVENOUS ONCE
Status: COMPLETED | OUTPATIENT
Start: 2023-12-12 | End: 2023-12-12

## 2023-12-12 RX ORDER — MAGNESIUM SULFATE HEPTAHYDRATE 40 MG/ML
2 INJECTION, SOLUTION INTRAVENOUS ONCE
Status: DISCONTINUED | OUTPATIENT
Start: 2023-12-12 | End: 2023-12-12

## 2023-12-12 RX ORDER — METHYLPREDNISOLONE SODIUM SUCCINATE 125 MG/2ML
80 INJECTION, POWDER, LYOPHILIZED, FOR SOLUTION INTRAMUSCULAR; INTRAVENOUS ONCE
Status: COMPLETED | OUTPATIENT
Start: 2023-12-12 | End: 2023-12-12

## 2023-12-12 RX ORDER — ALBUTEROL SULFATE 2.5 MG/3ML
5 SOLUTION RESPIRATORY (INHALATION) ONCE
Status: COMPLETED | OUTPATIENT
Start: 2023-12-12 | End: 2023-12-12

## 2023-12-12 RX ORDER — HYDROXYZINE HYDROCHLORIDE 25 MG/1
25 TABLET, FILM COATED ORAL EVERY 6 HOURS PRN
Status: DISCONTINUED | OUTPATIENT
Start: 2023-12-12 | End: 2023-12-15 | Stop reason: HOSPADM

## 2023-12-12 RX ORDER — PANTOPRAZOLE SODIUM 40 MG/1
40 TABLET, DELAYED RELEASE ORAL DAILY
Status: DISCONTINUED | OUTPATIENT
Start: 2023-12-13 | End: 2023-12-15 | Stop reason: HOSPADM

## 2023-12-12 RX ORDER — CARVEDILOL 12.5 MG/1
25 TABLET ORAL 2 TIMES DAILY WITH MEALS
Status: DISCONTINUED | OUTPATIENT
Start: 2023-12-13 | End: 2023-12-15 | Stop reason: HOSPADM

## 2023-12-12 RX ORDER — HEPARIN SODIUM 5000 [USP'U]/ML
5000 INJECTION, SOLUTION INTRAVENOUS; SUBCUTANEOUS EVERY 8 HOURS SCHEDULED
Status: DISCONTINUED | OUTPATIENT
Start: 2023-12-12 | End: 2023-12-15 | Stop reason: HOSPADM

## 2023-12-12 RX ORDER — HYDROCHLOROTHIAZIDE 25 MG/1
25 TABLET ORAL DAILY
Status: DISCONTINUED | OUTPATIENT
Start: 2023-12-13 | End: 2023-12-15 | Stop reason: HOSPADM

## 2023-12-12 RX ORDER — LANOLIN ALCOHOL/MO/W.PET/CERES
3 CREAM (GRAM) TOPICAL
Status: DISCONTINUED | OUTPATIENT
Start: 2023-12-12 | End: 2023-12-15 | Stop reason: HOSPADM

## 2023-12-12 RX ORDER — GUAIFENESIN 600 MG/1
600 TABLET, EXTENDED RELEASE ORAL 2 TIMES DAILY
Status: DISCONTINUED | OUTPATIENT
Start: 2023-12-12 | End: 2023-12-15 | Stop reason: HOSPADM

## 2023-12-12 RX ORDER — ACETAMINOPHEN 325 MG/1
650 TABLET ORAL EVERY 6 HOURS PRN
Status: DISCONTINUED | OUTPATIENT
Start: 2023-12-12 | End: 2023-12-15 | Stop reason: HOSPADM

## 2023-12-12 RX ORDER — BUPROPION HYDROCHLORIDE 75 MG/1
75 TABLET ORAL DAILY
Status: DISCONTINUED | OUTPATIENT
Start: 2023-12-12 | End: 2023-12-15 | Stop reason: HOSPADM

## 2023-12-12 RX ORDER — VILAZODONE HYDROCHLORIDE 20 MG/1
40 TABLET ORAL
Status: DISCONTINUED | OUTPATIENT
Start: 2023-12-13 | End: 2023-12-15 | Stop reason: HOSPADM

## 2023-12-12 RX ORDER — METHYLPREDNISOLONE SODIUM SUCCINATE 40 MG/ML
40 INJECTION, POWDER, LYOPHILIZED, FOR SOLUTION INTRAMUSCULAR; INTRAVENOUS EVERY 8 HOURS SCHEDULED
Status: DISCONTINUED | OUTPATIENT
Start: 2023-12-13 | End: 2023-12-13

## 2023-12-12 RX ORDER — BUDESONIDE AND FORMOTEROL FUMARATE DIHYDRATE 160; 4.5 UG/1; UG/1
2 AEROSOL RESPIRATORY (INHALATION) 2 TIMES DAILY
Status: DISCONTINUED | OUTPATIENT
Start: 2023-12-12 | End: 2023-12-15 | Stop reason: HOSPADM

## 2023-12-12 RX ADMIN — METHYLPREDNISOLONE SODIUM SUCCINATE 80 MG: 125 INJECTION, POWDER, FOR SOLUTION INTRAMUSCULAR; INTRAVENOUS at 16:16

## 2023-12-12 RX ADMIN — CEFTRIAXONE 1000 MG: 1 INJECTION, SOLUTION INTRAVENOUS at 21:33

## 2023-12-12 RX ADMIN — ALBUTEROL SULFATE 5 MG: 2.5 SOLUTION RESPIRATORY (INHALATION) at 16:22

## 2023-12-12 RX ADMIN — IPRATROPIUM BROMIDE 0.5 MG: 0.5 SOLUTION RESPIRATORY (INHALATION) at 16:22

## 2023-12-12 RX ADMIN — HEPARIN SODIUM 5000 UNITS: 5000 INJECTION INTRAVENOUS; SUBCUTANEOUS at 21:34

## 2023-12-12 RX ADMIN — GUAIFENESIN 600 MG: 600 TABLET ORAL at 21:26

## 2023-12-12 RX ADMIN — BUPROPION HYDROCHLORIDE 75 MG: 75 TABLET, FILM COATED ORAL at 23:11

## 2023-12-12 RX ADMIN — BUDESONIDE AND FORMOTEROL FUMARATE DIHYDRATE 2 PUFF: 160; 4.5 AEROSOL RESPIRATORY (INHALATION) at 23:11

## 2023-12-12 RX ADMIN — MAGNESIUM SULFATE HEPTAHYDRATE 2 G: 40 INJECTION, SOLUTION INTRAVENOUS at 16:11

## 2023-12-12 RX ADMIN — IPRATROPIUM BROMIDE 0.5 MG: 0.5 SOLUTION RESPIRATORY (INHALATION) at 21:34

## 2023-12-12 NOTE — ED NOTES
Patient pulled off bipap mask and refusing at this time. Provider made aware.       Sedrick Santillan RN  12/12/23 5724

## 2023-12-12 NOTE — ED PROVIDER NOTES
History  Chief Complaint   Patient presents with    Shortness of Breath     Pt arrived via ems with SOB , 4 NC at baseline, ems reports a kink was in the line, also complaining of high BP as well as a run of [place] with a rapid drop of spo2 on the way in      70 yo with sob. Acute on chronic. Recently admitted for COPD. Wears 4L baseline. Upon EMS arriving on scene there was a kink in her oxygen tubing. She was hypoxic in the mid 80s. Improved by placing her on 4L NC. En route to hospital she reports she had a sudden funny feeling in her chest and per EMS when this occurred her sat dropped and she had a run of [place]. It had spontaneously resolved. No tele strip available for this event. EMS estimates it was only about 5 seconds before resolved. History provided by:  Patient   used: No    Shortness of Breath  Associated symptoms: no abdominal pain, no chest pain, no cough, no ear pain, no fever, no rash, no sore throat and no vomiting        Prior to Admission Medications   Prescriptions Last Dose Informant Patient Reported? Taking? Calcium-Magnesium-Vitamin D (CALCIUM 500 PO) 12/13/2023  Yes Yes   Sig: Take 1,000 mg by mouth daily   HYDROcodone-acetaminophen (NORCO) 5-325 mg per tablet   Yes No   Sig: Take 1 tablet by mouth every 6 (six) hours as needed for pain   Zanubrutinib 80 MG CAPS 12/13/2023  Yes Yes   Sig: Take 160 mg by mouth 2 (two) times a day   albuterol (VENTOLIN HFA) 90 mcg/act inhaler 12/13/2023  No Yes   Sig: Inhale 2 puffs every 6 (six) hours as needed for wheezing   aspirin (ECOTRIN LOW STRENGTH) 81 mg EC tablet   No No   Sig: Take 1 tablet (81 mg total) by mouth daily   buPROPion (WELLBUTRIN) 75 mg tablet 12/13/2023  Yes Yes   Sig: Take 75 mg by mouth in the morning   budesonide-formoterol (SYMBICORT) 160-4.5 mcg/act inhaler 12/13/2023  No Yes   Sig: Inhale 2 puffs 2 (two) times a day Rinse mouth after use.    cariprazine (Vraylar) 1.5 MG capsule 12/13/2023  Yes Yes Sig: Take 1.5 mg by mouth daily   carvedilol (COREG) 25 mg tablet 12/13/2023  Yes Yes   Sig: Take 25 mg by mouth 2 (two) times a day with meals   guaiFENesin (MUCINEX) 600 mg 12 hr tablet 12/13/2023  No Yes   Sig: Take 1 tablet (600 mg total) by mouth 2 (two) times a day   hydrOXYzine HCL (ATARAX) 25 mg tablet   No No   Sig: Take 1 tablet (25 mg total) by mouth every 6 (six) hours as needed for anxiety   hydrochlorothiazide (HYDRODIURIL) 25 mg tablet 12/13/2023  No Yes   Sig: Take 1 tablet (25 mg total) by mouth daily   ipratropium (ATROVENT) 0.02 % nebulizer solution   No No   Sig: Take 2.5 mL (0.5 mg total) by nebulization 3 (three) times a day   lidocaine (LIDODERM) 5 %   No No   Sig: Apply 2 patches topically over 12 hours daily for 10 days Remove & Discard patch within 12 hours or as directed by MD   melatonin 3 mg   No No   Sig: Take 1 tablet (3 mg total) by mouth daily at bedtime as needed (insomnia)   nitroglycerin (NITROSTAT) 0.4 mg SL tablet   Yes No   Sig: Place 0.4 mg under the tongue every 5 (five) minutes as needed for chest pain   pantoprazole (PROTONIX) 40 mg tablet 12/13/2023  No Yes   Sig: Take 1 tablet (40 mg total) by mouth daily   vilazodone (VIIBRYD) 40 mg tablet 12/13/2023  Yes Yes   Sig: Take 40 mg by mouth daily with breakfast      Facility-Administered Medications: None       Past Medical History:   Diagnosis Date    Acute congestive heart failure ([address]) 8/27/2021    Anxiety     Cardiac disease     Chest pain 8/27/2021    Chronic pain disorder     COPD (chronic obstructive pulmonary disease) ([address])     Depression     Heart disease     Hyperlipidemia     Hypertension     MI (myocardial infarction) ([address])     MRSA (methicillin resistant Staphylococcus aureus)     Renal disorder     benign kidney tumor       Past Surgical History:   Procedure Laterality Date    APPENDECTOMY      ELBOW BURSA SURGERY Left 02/2018    D/T MRSA INFECTION    SPINAL FUSION      L7 L9       Family History   Problem Relation Age of Onset    Heart disease Mother     Cancer Father      I have reviewed and agree with the history as documented. E-Cigarette/Vaping    E-Cigarette Use Never User      E-Cigarette/Vaping Substances    Nicotine No     THC No     CBD No     Flavoring No     Other No     Unknown No      Social History     Tobacco Use    Smoking status: Former     Current packs/day: 0.00     Average packs/day: 1 pack/day for 60.0 years (60.0 ttl pk-yrs)     Types: Cigarettes     Start date:      Quit date:      Years since quittin.9    Smokeless tobacco: Never    Tobacco comments:     She has close to a 60 pack-year smoking history, most recently has cut down to 4-5 cigarettes daily   Vaping Use    Vaping status: Never Used   Substance Use Topics    Alcohol use: Never    Drug use: No       Review of Systems   Constitutional:  Negative for chills and fever. HENT:  Negative for ear pain and sore throat. Eyes:  Negative for pain and visual disturbance. Respiratory:  Positive for shortness of breath. Negative for cough. Cardiovascular:  Negative for chest pain and palpitations. Gastrointestinal:  Negative for abdominal pain and vomiting. Genitourinary:  Negative for dysuria and hematuria. Musculoskeletal:  Negative for arthralgias and back pain. Skin:  Negative for color change and rash. Neurological:  Negative for seizures and syncope. All other systems reviewed and are negative. Physical Exam  Physical Exam  Vitals and nursing note reviewed. Constitutional:       General: She is not in acute distress. Appearance: She is well-developed. HENT:      Head: Normocephalic and atraumatic. Eyes:      Conjunctiva/sclera: Conjunctivae normal.   Cardiovascular:      Rate and Rhythm: Normal rate and regular rhythm. Heart sounds: No murmur heard. Pulmonary:      Effort: Pulmonary effort is normal. No respiratory distress. Breath sounds: Normal breath sounds.    Abdominal: Palpations: Abdomen is soft. Tenderness: There is no abdominal tenderness. Musculoskeletal:         General: No swelling. Cervical back: Neck supple. Skin:     General: Skin is warm and dry. Capillary Refill: Capillary refill takes less than 2 seconds. Neurological:      Mental Status: She is alert and oriented to person, place, and time. GCS: GCS eye subscore is 4. GCS verbal subscore is 5. GCS motor subscore is 6. Comments: GCS 15. AAOx3. CN II-XII grossly intact. No focal neuro deficits.      Psychiatric:         Mood and Affect: Mood normal.         Vital Signs  ED Triage Vitals   Temperature Pulse Respirations Blood Pressure SpO2   12/12/23 1636 12/12/23 1537 12/12/23 1537 12/12/23 1537 12/12/23 1537   97.8 °F (36.6 °C) 98 (!) 23 142/91 (!) 88 %      Temp Source Heart Rate Source Patient Position - Orthostatic VS BP Location FiO2 (%)   12/12/23 1636 12/12/23 1537 12/12/23 1537 12/12/23 1537 --   Oral Monitor Lying Right arm       Pain Score       12/12/23 1636       No Pain           Vitals:    12/14/23 1213 12/14/23 1232 12/14/23 1234 12/14/23 1235   BP: 129/68 141/68 127/67 132/67   Pulse: 89 90 87 98   Patient Position - Orthostatic VS:  Sitting - Orthostatic VS Lying - Orthostatic VS Standing - Orthostatic VS         Visual Acuity      ED Medications  Medications   albuterol (PROVENTIL HFA,VENTOLIN HFA) inhaler 2 puff (has no administration in time range)   aspirin (ECOTRIN LOW STRENGTH) EC tablet 81 mg (81 mg Oral Given 12/14/23 0819)   budesonide-formoterol (SYMBICORT) 160-4.5 mcg/act inhaler 2 puff (2 puffs Inhalation Given 12/14/23 0831)   buPROPion (WELLBUTRIN) tablet 75 mg (75 mg Oral Given 12/14/23 0819)   carvedilol (COREG) tablet 25 mg (25 mg Oral Given 12/14/23 0819)   guaiFENesin (MUCINEX) 12 hr tablet 600 mg (600 mg Oral Given 12/14/23 0819)   hydrochlorothiazide (HYDRODIURIL) tablet 25 mg (25 mg Oral Given 12/14/23 9733)   hydrOXYzine HCL (ATARAX) tablet 25 mg (25 mg Oral Given 12/13/23 0910)   melatonin tablet 3 mg (3 mg Oral Given 12/14/23 0130)   pantoprazole (PROTONIX) EC tablet 40 mg (40 mg Oral Given 12/14/23 0819)   vilazodone (VIIBRYD) tablet 40 mg (40 mg Oral Given 12/14/23 0821)   acetaminophen (TYLENOL) tablet 650 mg (has no administration in time range)   ipratropium (ATROVENT) 0.02 % inhalation solution 0.5 mg (0.5 mg Nebulization Given 12/14/23 1331)   heparin (porcine) subcutaneous injection 5,000 Units (5,000 Units Subcutaneous Given 12/14/23 0629)   hydrALAZINE (APRESOLINE) injection 5 mg (has no administration in time range)   levalbuterol (XOPENEX) inhalation solution 1.25 mg (1.25 mg Nebulization Given 12/14/23 1331)   Zanubrutinib CAPS 160 mg (160 mg Oral Given 12/14/23 0821)   predniSONE tablet 40 mg (has no administration in time range)   meclizine (ANTIVERT) tablet 25 mg (has no administration in time range)   magnesium sulfate 2 g/50 mL IVPB (premix) 2 g (0 g Intravenous Stopped 12/12/23 1916)   albuterol inhalation solution 5 mg (5 mg Nebulization Given 12/12/23 1622)   ipratropium (ATROVENT) 0.02 % inhalation solution 0.5 mg (0.5 mg Nebulization Given 12/12/23 1622)   methylPREDNISolone sodium succinate (Solu-MEDROL) injection 80 mg (80 mg Intravenous Given 12/12/23 1616)   magnesium sulfate 2 g/50 mL IVPB (premix) 2 g (0 g Intravenous Stopped 12/13/23 0305)       Diagnostic Studies  Results Reviewed       Procedure Component Value Units Date/Time    Basic metabolic panel [[id number]]  (Abnormal) Collected: 12/14/23 0449    Lab Status: Final result Specimen: Blood from Arm, Left Updated: 12/14/23 0527     Sodium 140 mmol/L      Potassium 3.7 mmol/L      Chloride 94 mmol/L      CO2 42 mmol/L      ANION GAP 4 mmol/L      BUN 33 mg/dL      Creatinine 0.98 mg/dL      Glucose 152 mg/dL      Calcium 8.8 mg/dL      eGFR 55 ml/min/1.73sq m     Narrative:      [facility] guidelines for Chronic Kidney Disease (CKD):     Stage 1 with normal or high GFR (GFR > 90 mL/min/1.73 square meters)    Stage 2 Mild CKD (GFR = 60-89 mL/min/1.73 square meters)    Stage 3A Moderate CKD (GFR = 45-59 mL/min/1.73 square meters)    Stage 3B Moderate CKD (GFR = 30-44 mL/min/1.73 square meters)    Stage 4 Severe CKD (GFR = 15-29 mL/min/1.73 square meters)    Stage 5 End Stage CKD (GFR <15 mL/min/1.73 square meters)  Note: GFR calculation is accurate only with a steady state creatinine    CBC [[id number]]  (Abnormal) Collected: 12/14/23 0449    Lab Status: Final result Specimen: Blood from Arm, Left Updated: 12/14/23 0508     WBC 5.93 Thousand/uL      RBC 3.17 Million/uL      Hemoglobin 10.1 g/dL      Hematocrit 31.9 %       fL      MCH 31.9 pg      MCHC 31.7 g/dL      RDW 12.8 %      Platelets 562 Thousands/uL      MPV 10.0 fL     Blood culture #1 [[id number]] Collected: 12/12/23 1625    Lab Status: Preliminary result Specimen: Blood from Arm, Right Updated: 12/13/23 2201     Blood Culture No Growth at 24 hrs. Blood culture #2 [[id number]] Collected: 12/12/23 1625    Lab Status: Preliminary result Specimen: Blood from Arm, Left Updated: 12/13/23 2201     Blood Culture No Growth at 24 hrs.     Procalcitonin, Next Day AM Collection [[id number]]  (Normal) Collected: 12/13/23 0513    Lab Status: Final result Specimen: Blood from Arm, Right Updated: 12/13/23 0551     Procalcitonin <0.05 ng/ml     Basic metabolic panel [[id number]]  (Abnormal) Collected: 12/13/23 0513    Lab Status: Final result Specimen: Blood from Arm, Right Updated: 12/13/23 0548     Sodium 141 mmol/L      Potassium 4.1 mmol/L      Chloride 92 mmol/L      CO2 43 mmol/L      ANION GAP 6 mmol/L      BUN 21 mg/dL      Creatinine 0.86 mg/dL      Glucose 113 mg/dL      Calcium 9.8 mg/dL      eGFR 65 ml/min/1.73sq m     Narrative:      [place] guidelines for Chronic Kidney Disease (CKD):     Stage 1 with normal or high GFR (GFR > 90 mL/min/1.73 square meters)    Stage 2 Mild CKD (GFR = 60-89 mL/min/1.73 square meters)    Stage 3A Moderate CKD (GFR = 45-59 mL/min/1.73 square meters)    Stage 3B Moderate CKD (GFR = 30-44 mL/min/1.73 square meters)    Stage 4 Severe CKD (GFR = 15-29 mL/min/1.73 square meters)    Stage 5 End Stage CKD (GFR <15 mL/min/1.73 square meters)  Note: GFR calculation is accurate only with a steady state creatinine    CBC (With Platelets) [[id number]]  (Abnormal) Collected: 12/13/23 0513    Lab Status: Final result Specimen: Blood from Arm, Right Updated: 12/13/23 0528     WBC 6.01 Thousand/uL      RBC 3.29 Million/uL      Hemoglobin 10.5 g/dL      Hematocrit 33.5 %       fL      MCH 31.9 pg      MCHC 31.3 g/dL      RDW 12.6 %      Platelets 068 Thousands/uL      MPV 10.0 fL     Blood gas, venous [[id number]]  (Abnormal) Collected: 12/13/23 0513    Lab Status: Final result Specimen: Blood from Arm, Right Updated: 12/13/23 0525     pH, Alex 7.470     pCO2, Alex 59.9 mm Hg      pO2, Alex 95.8 mm Hg      HCO3, Alex 42.6 mmol/L      Base Excess, Alex 16.3 mmol/L      O2 Content, Alex 16.0 ml/dL      O2 HGB, VENOUS 95.6 %     Procalcitonin [[id number]]  (Normal) Collected: 12/12/23 2132    Lab Status: Final result Specimen: Blood from Arm, Left Updated: 12/12/23 2212     Procalcitonin <0.05 ng/ml     HS Troponin I 4hr [[id number]]  (Normal) Collected: 12/12/23 2132    Lab Status: Final result Specimen: Blood from Arm, Left Updated: 12/12/23 2211     hs TnI 4hr 7 ng/L      Delta 4hr hsTnI 0 ng/L     Lactic acid, plasma (w/reflex if result > 2.0) [[id number]]  (Normal) Collected: 12/12/23 2132    Lab Status: Final result Specimen: Blood from Arm, Left Updated: 12/12/23 2204     LACTIC ACID 1.0 mmol/L     Narrative:      Result may be elevated if tourniquet was used during collection.     HS Troponin I 2hr [[id number]]  (Normal) Collected: 12/12/23 1805    Lab Status: Final result Specimen: Blood Updated: 12/12/23 1837     hs TnI 2hr 7 ng/L      Delta 2hr hsTnI 0 ng/L     Blood gas, venous [[id number]]  (Abnormal) Collected: 12/12/23 1758    Lab Status: Final result Specimen: Blood from Arm, Left Updated: 12/12/23 1808     pH, Alex 7.364     pCO2, Alex 71.5 mm Hg      pO2, Alex 61.3 mm Hg      HCO3, Alex 39.9 mmol/L      Base Excess, Alex 11.7 mmol/L      O2 Content, Alex 15.5 ml/dL      O2 HGB, VENOUS 88.6 %     Magnesium [[id number]]  (Abnormal) Collected: 12/12/23 1549    Lab Status: Final result Specimen: Blood from Arm, Right Updated: 12/12/23 1717     Magnesium 1.7 mg/dL     Comprehensive metabolic panel [[id number]]  (Abnormal) Collected: 12/12/23 1541    Lab Status: Final result Specimen: Blood from Arm, Right Updated: 12/12/23 1639     Sodium 143 mmol/L      Potassium 4.5 mmol/L      Chloride 93 mmol/L      CO2 >45 mmol/L      ANION GAP --     BUN 19 mg/dL      Creatinine 0.91 mg/dL      Glucose 153 mg/dL      Calcium 10.2 mg/dL      AST 13 U/L      ALT 4 U/L      Alkaline Phosphatase 141 U/L      Total Protein 7.2 g/dL      Albumin 4.2 g/dL      Total Bilirubin 0.36 mg/dL      eGFR 61 ml/min/1.73sq m     Narrative:      [place] guidelines for Chronic Kidney Disease (CKD):     Stage 1 with normal or high GFR (GFR > 90 mL/min/1.73 square meters)    Stage 2 Mild CKD (GFR = 60-89 mL/min/1.73 square meters)    Stage 3A Moderate CKD (GFR = 45-59 mL/min/1.73 square meters)    Stage 3B Moderate CKD (GFR = 30-44 mL/min/1.73 square meters)    Stage 4 Severe CKD (GFR = 15-29 mL/min/1.73 square meters)    Stage 5 End Stage CKD (GFR <15 mL/min/1.73 square meters)  Note: GFR calculation is accurate only with a steady state creatinine    HS Troponin 0hr (reflex protocol) [[id number]]  (Normal) Collected: 12/12/23 1541    Lab Status: Final result Specimen: Blood from Arm, Right Updated: 12/12/23 1630     hs TnI 0hr 7 ng/L     B-Type Natriuretic Peptide(BNP) [[id number]]  (Normal) Collected: 12/12/23 1540    Lab Status: Final result Specimen: Blood from Arm, Right Updated: 12/12/23 1630     BNP 58 pg/mL     FLU/RSV/COVID - if FLU/RSV clinically relevant [[id number]]  (Normal) Collected: 12/12/23 1541    Lab Status: Final result Specimen: Nares from Nose Updated: 12/12/23 1626     SARS-CoV-2 Negative     INFLUENZA A PCR Negative     INFLUENZA B PCR Negative     RSV PCR Negative    Narrative:      FOR PEDIATRIC PATIENTS - copy/paste COVID Guidelines URL to browser: [email or web address]    SARS-CoV-2 assay is a Nucleic Acid Amplification assay intended for the  qualitative detection of nucleic acid from SARS-CoV-2 in nasopharyngeal  swabs. Results are for the presumptive identification of SARS-CoV-2 RNA. Positive results are indicative of infection with SARS-CoV-2, the virus  causing COVID-19, but do not rule out bacterial infection or co-infection  with other viruses. Laboratories within the [facility] and its  territories are required to report all positive results to the appropriate  public health authorities. Negative results do not preclude SARS-CoV-2  infection and should not be used as the sole basis for treatment or other  patient management decisions. Negative results must be combined with  clinical observations, patient history, and epidemiological information. This test has not been FDA cleared or approved. This test has been authorized by FDA under an Emergency Use Authorization  (EUA). This test is only authorized for the duration of time the  declaration that circumstances exist justifying the authorization of the  emergency use of an in vitro diagnostic tests for detection of SARS-CoV-2  virus and/or diagnosis of COVID-19 infection under section 564(b)(1) of  the Act, 21 U. S.C. 484HST-7(V)(2), unless the authorization is terminated  or revoked sooner. The test has been validated but independent review by FDA  and CLIA is pending. Test performed using [employer]:  This RT-PCR assay targets N2,  a region unique to SARS-CoV-2. A conserved region in the E-gene was chosen  for pan-Sarbecovirus detection which includes SARS-CoV-2. According to CMS-2020-01-R, this platform meets the definition of high-throughput technology. Blood gas, venous [[id number]]  (Abnormal) Collected: 12/12/23 1541    Lab Status: Final result Specimen: Blood from Arm, Right Updated: 12/12/23 1614     pH, Alex 7.225     pCO2, Alex 131.7 mm Hg      pO2, Alex 36.2 mm Hg      HCO3, Alex 53.3 mmol/L      Base Excess, Alex 19.9 mmol/L      O2 Content, Alex 10.6 ml/dL      O2 HGB, VENOUS 61.1 %     Protime-INR [[id number]]  (Normal) Collected: 12/12/23 1541    Lab Status: Final result Specimen: Blood from Arm, Right Updated: 12/12/23 1604     Protime 13.3 seconds      INR 0.95    APTT [[id number]]  (Normal) Collected: 12/12/23 1541    Lab Status: Final result Specimen: Blood from Arm, Right Updated: 12/12/23 1604     PTT 28 seconds     CBC and differential [[id number]]  (Abnormal) Collected: 12/12/23 1541    Lab Status: Final result Specimen: Blood from Arm, Right Updated: 12/12/23 1550     WBC 10.23 Thousand/uL      RBC 3.70 Million/uL      Hemoglobin 12.0 g/dL      Hematocrit 38.8 %       fL      MCH 32.4 pg      MCHC 30.9 g/dL      RDW 12.6 %      MPV 9.7 fL      Platelets 867 Thousands/uL      nRBC 0 /100 WBCs      Neutrophils Relative 79 %      Immat GRANS % 2 %      Lymphocytes Relative 15 %      Monocytes Relative 3 %      Eosinophils Relative 1 %      Basophils Relative 0 %      Neutrophils Absolute 8.04 Thousands/µL      Immature Grans Absolute 0.16 Thousand/uL      Lymphocytes Absolute 1.53 Thousands/µL      Monocytes Absolute 0.33 Thousand/µL      Eosinophils Absolute 0.14 Thousand/µL      Basophils Absolute 0.03 Thousands/µL                    XR chest 1 view portable   ED Interpretation by Neetu Kennedy PA-C (12/12 1608)   No acute abnormalities.       Final Result by Symone Hollis MD (12/13 1103)      No acute cardiopulmonary disease. Resident: Cholo Bowens, the attending radiologist, have reviewed the images and agree with the final report above. Workstation performed: [id number]                    Procedures  ECG 12 Lead Documentation Only    Date/Time: 12/12/2023 6:55 PM    Performed by: Flores Jimenez PA-C  Authorized by: Flores Jimenez PA-C    ECG reviewed by me, the ED Provider: yes    Patient location:  ED  Interpretation:     Interpretation: normal    Quality:     Tracing quality:  Limited by artifact  Rate:     ECG rate:  98    ECG rate assessment: normal    Rhythm:     Rhythm: sinus rhythm    Ectopy:     Ectopy: none    QRS:     QRS axis:  Normal    QRS intervals:  Normal  Conduction:     Conduction: normal    ST segments:     ST segments:  Normal  T waves:     T waves: normal             ED Course                               SBIRT 20yo+      Flowsheet Row Most Recent Value   Initial Alcohol Screen: US AUDIT-C     1. How often do you have a drink containing alcohol? 0 Filed at: 12/12/2023 1553   2. How many drinks containing alcohol do you have on a typical day you are drinking? 0 Filed at: 12/12/2023 1553   3a. Male UNDER 65: How often do you have five or more drinks on one occasion? 0 Filed at: 12/12/2023 1553   3b. FEMALE Any Age, or MALE 65+: How often do you have 4 or more drinks on one occassion? 0 Filed at: 12/12/2023 1553   Audit-C Score 0 Filed at: 12/12/2023 1553   NIA: How many times in the past year have you. .. Used an illegal drug or used a prescription medication for non-medical reasons? Never Filed at: 12/12/2023 1553                      Medical Decision Making  DDX including but not limited to: pneumonia, pleural effusion, CHF, PE, PTX, ACS, MI, asthma exacerbation, COPD exacerbation, COVID 19, EVALI, anemia, metabolic abnormality, renal failure. Plan: Cardiac workup. Continue O2 by NC as pt feels better and has normal sat. Tele monitor given run of NSVT.  Check VBG    MDM: 70 yo with sob. Improved now. VBG shows resp acidosis from hypercapnea likely 2/2 her copd and O2 tubing being kinked at home. Placed on BIPAP for about 1 hour. Symptoms improved. VBG improving. She is not and at no point was she ever somnolent or obtunded thankfully. Plan to admit for COPD exacerbation and tele obs for her NSVT. Pt in agreement. Amount and/or Complexity of Data Reviewed  Labs: ordered. Radiology: independent interpretation performed. Risk  Prescription drug management. Decision regarding hospitalization. Disposition  Final diagnoses:   COPD with acute exacerbation ([address])   Respiratory acidosis     Time reflects when diagnosis was documented in both MDM as applicable and the Disposition within this note       Time User Action Codes Description Comment    12/12/2023  7:51 PM Kizzy Karlo PHILIP Add [J44.1] COPD with acute exacerbation ([address])     12/12/2023  7:51 PM Janetholivia Murrietas Add [E87.29] Respiratory acidosis           ED Disposition       ED Disposition   Admit    Condition   Stable    Date/Time   Tue Dec 12, 2023 1951    Comment   Case was discussed with Dr. Eryn Anderson and the patient's admission status was agreed to be Admission Status: inpatient status to the service of Dr. Eryn Anderson . Follow-up Information    None         Current Discharge Medication List        CONTINUE these medications which have NOT CHANGED    Details   albuterol (VENTOLIN HFA) 90 mcg/act inhaler Inhale 2 puffs every 6 (six) hours as needed for wheezing  Qty: 1 Inhaler, Refills: 0    Comments: Substitution to a formulary equivalent within the same pharmaceutical class is authorized. Associated Diagnoses: COPD exacerbation (HCC)      budesonide-formoterol (SYMBICORT) 160-4.5 mcg/act inhaler Inhale 2 puffs 2 (two) times a day Rinse mouth after use.   Qty: 1 Inhaler, Refills: 0    Associated Diagnoses: COPD exacerbation (HCC)      buPROPion (WELLBUTRIN) 75 mg tablet Take 75 mg by mouth in the morning      Calcium-Magnesium-Vitamin D (CALCIUM 500 PO) Take 1,000 mg by mouth daily      cariprazine (Vraylar) 1.5 MG capsule Take 1.5 mg by mouth daily      carvedilol (COREG) 25 mg tablet Take 25 mg by mouth 2 (two) times a day with meals      guaiFENesin (MUCINEX) 600 mg 12 hr tablet Take 1 tablet (600 mg total) by mouth 2 (two) times a day  Qty: 60 tablet, Refills: 0    Associated Diagnoses: COPD exacerbation (HCC)      hydrochlorothiazide (HYDRODIURIL) 25 mg tablet Take 1 tablet (25 mg total) by mouth daily  Qty: 30 tablet, Refills: 0    Associated Diagnoses: Essential hypertension      pantoprazole (PROTONIX) 40 mg tablet Take 1 tablet (40 mg total) by mouth daily  Qty: 30 tablet, Refills: 0    Associated Diagnoses: Anemia, unspecified type      vilazodone (VIIBRYD) 40 mg tablet Take 40 mg by mouth daily with breakfast      Zanubrutinib 80 MG CAPS Take 160 mg by mouth 2 (two) times a day      aspirin (ECOTRIN LOW STRENGTH) 81 mg EC tablet Take 1 tablet (81 mg total) by mouth daily  Qty: 30 tablet, Refills: 0    Associated Diagnoses: COPD (chronic obstructive pulmonary disease) (HCC)      HYDROcodone-acetaminophen (NORCO) 5-325 mg per tablet Take 1 tablet by mouth every 6 (six) hours as needed for pain      hydrOXYzine HCL (ATARAX) 25 mg tablet Take 1 tablet (25 mg total) by mouth every 6 (six) hours as needed for anxiety  Refills: 0    Associated Diagnoses: COPD with acute exacerbation (HCC)      ipratropium (ATROVENT) 0.02 % nebulizer solution Take 2.5 mL (0.5 mg total) by nebulization 3 (three) times a day  Qty: 225 mL, Refills: 0    Associated Diagnoses: COPD with acute exacerbation (HCC)      lidocaine (LIDODERM) 5 % Apply 2 patches topically over 12 hours daily for 10 days Remove & Discard patch within 12 hours or as directed by MD  Qty: 20 patch, Refills: 0    Associated Diagnoses: COPD exacerbation (HCC)      melatonin 3 mg Take 1 tablet (3 mg total) by mouth daily at bedtime as needed (insomnia)  Qty: 30 tablet, Refills: 0    Associated Diagnoses: Major depressive disorder, recurrent episode, moderate (HCC)      nitroglycerin (NITROSTAT) 0.4 mg SL tablet Place 0.4 mg under the tongue every 5 (five) minutes as needed for chest pain             No discharge procedures on file.     PDMP Review         Value Time User    PDMP Reviewed  Yes 11/21/2023  9:21 Emily Rodriguez MD            ED Provider  Electronically Signed by             Júnior Carpenter PA-C  12/14/23 2266

## 2023-12-12 NOTE — RESPIRATORY THERAPY NOTE
12/12/23 1715   Respiratory Assessment   Assessment Type Assess only   General Appearance Drowsy   Respiratory Pattern Normal   Chest Assessment Chest expansion symmetrical   Resp Comments Patient was placed on Bipap for a concern of hypercapnea and drowsiness.   spo2 98% on 14/6 40%   O2 Device v60   Non-Invasive Information   O2 Interface Device Face mask   Non-Invasive Ventilation Mode BiPAP   $ Continous NIV Initial   SpO2 95 %   $ Pulse Oximetry Spot Check Charge Completed   Non-Invasive Settings   IPAP (cm) 14 cm   EPAP (cm) 6 cm   Rate (Set) 8   FiO2 (%) 40   Pressure Support (cm H2O) 8   Rise Time 2   Inspiratory Time (Set) 0.9   Non-Invasive Readings   Skin Intervention Skin intact   Total Rate 24   MV (Mech) 8.4   Peak Pressure (Obs) 15   Spontaneous Vt (mL) 354   Leak (lpm) 0   Non-Invasive Alarms   Insp Pressure High (cm H20) 25   Insp Pressure Low (cm H20) 6   Low Insp Pressure Time (sec) 20 sec   MV Low (L/min) 3   Vt High (mL) 1200   Vt Low (mL) 120   High Resp Rate (BPM) 50 BPM   Low Resp Rate (BPM) 6 BPM

## 2023-12-13 PROBLEM — Z60.9 SOCIAL PROBLEM: Status: ACTIVE | Noted: 2023-12-13

## 2023-12-13 PROBLEM — Z65.9 SOCIAL PROBLEM: Status: ACTIVE | Noted: 2023-12-13

## 2023-12-13 LAB
ANION GAP SERPL CALCULATED.3IONS-SCNC: 6 MMOL/L
ATRIAL RATE: 98 BPM
BASE EX.OXY STD BLDV CALC-SCNC: 95.6 % (ref 60–80)
BASE EXCESS BLDV CALC-SCNC: 16.3 MMOL/L
BUN SERPL-MCNC: 21 MG/DL (ref 5–25)
CALCIUM SERPL-MCNC: 9.8 MG/DL (ref 8.4–10.2)
CHLORIDE SERPL-SCNC: 92 MMOL/L (ref 96–108)
CO2 SERPL-SCNC: 43 MMOL/L (ref 21–32)
CREAT SERPL-MCNC: 0.86 MG/DL (ref 0.6–1.3)
ERYTHROCYTE [DISTWIDTH] IN BLOOD BY AUTOMATED COUNT: 12.6 % (ref 11.6–15.1)
GFR SERPL CREATININE-BSD FRML MDRD: 65 ML/MIN/1.73SQ M
GLUCOSE SERPL-MCNC: 113 MG/DL (ref 65–140)
HCO3 BLDV-SCNC: 42.6 MMOL/L (ref 24–30)
HCT VFR BLD AUTO: 33.5 % (ref 34.8–46.1)
HGB BLD-MCNC: 10.5 G/DL (ref 11.5–15.4)
MCH RBC QN AUTO: 31.9 PG (ref 26.8–34.3)
MCHC RBC AUTO-ENTMCNC: 31.3 G/DL (ref 31.4–37.4)
MCV RBC AUTO: 102 FL (ref 82–98)
O2 CT BLDV-SCNC: 16 ML/DL
P AXIS: 87 DEGREES
PCO2 BLDV: 59.9 MM HG (ref 42–50)
PH BLDV: 7.47 [PH] (ref 7.3–7.4)
PLATELET # BLD AUTO: 178 THOUSANDS/UL (ref 149–390)
PMV BLD AUTO: 10 FL (ref 8.9–12.7)
PO2 BLDV: 95.8 MM HG (ref 35–45)
POTASSIUM SERPL-SCNC: 4.1 MMOL/L (ref 3.5–5.3)
PR INTERVAL: 132 MS
PROCALCITONIN SERPL-MCNC: <0.05 NG/ML
QRS AXIS: 91 DEGREES
QRSD INTERVAL: 84 MS
QT INTERVAL: 354 MS
QTC INTERVAL: 451 MS
RBC # BLD AUTO: 3.29 MILLION/UL (ref 3.81–5.12)
SODIUM SERPL-SCNC: 141 MMOL/L (ref 135–147)
T WAVE AXIS: 73 DEGREES
VENTRICULAR RATE: 98 BPM
WBC # BLD AUTO: 6.01 THOUSAND/UL (ref 4.31–10.16)

## 2023-12-13 PROCEDURE — 82805 BLOOD GASES W/O2 SATURATION: CPT | Performed by: INTERNAL MEDICINE

## 2023-12-13 PROCEDURE — 94660 CPAP INITIATION&MGMT: CPT

## 2023-12-13 PROCEDURE — 85027 COMPLETE CBC AUTOMATED: CPT | Performed by: INTERNAL MEDICINE

## 2023-12-13 PROCEDURE — 84145 PROCALCITONIN (PCT): CPT | Performed by: INTERNAL MEDICINE

## 2023-12-13 PROCEDURE — 94760 N-INVAS EAR/PLS OXIMETRY 1: CPT

## 2023-12-13 PROCEDURE — 99233 SBSQ HOSP IP/OBS HIGH 50: CPT | Performed by: FAMILY MEDICINE

## 2023-12-13 PROCEDURE — 36415 COLL VENOUS BLD VENIPUNCTURE: CPT | Performed by: INTERNAL MEDICINE

## 2023-12-13 PROCEDURE — 94640 AIRWAY INHALATION TREATMENT: CPT

## 2023-12-13 PROCEDURE — 99223 1ST HOSP IP/OBS HIGH 75: CPT | Performed by: INTERNAL MEDICINE

## 2023-12-13 PROCEDURE — 80048 BASIC METABOLIC PNL TOTAL CA: CPT | Performed by: INTERNAL MEDICINE

## 2023-12-13 PROCEDURE — 94664 DEMO&/EVAL PT USE INHALER: CPT

## 2023-12-13 RX ORDER — MAGNESIUM SULFATE HEPTAHYDRATE 40 MG/ML
2 INJECTION, SOLUTION INTRAVENOUS ONCE
Status: COMPLETED | OUTPATIENT
Start: 2023-12-13 | End: 2023-12-13

## 2023-12-13 RX ORDER — HYDRALAZINE HYDROCHLORIDE 20 MG/ML
5 INJECTION INTRAMUSCULAR; INTRAVENOUS EVERY 6 HOURS PRN
Status: DISCONTINUED | OUTPATIENT
Start: 2023-12-13 | End: 2023-12-15 | Stop reason: HOSPADM

## 2023-12-13 RX ORDER — LEVALBUTEROL INHALATION SOLUTION 1.25 MG/3ML
1.25 SOLUTION RESPIRATORY (INHALATION)
Status: DISCONTINUED | OUTPATIENT
Start: 2023-12-13 | End: 2023-12-15 | Stop reason: HOSPADM

## 2023-12-13 RX ORDER — METHYLPREDNISOLONE SODIUM SUCCINATE 40 MG/ML
40 INJECTION, POWDER, LYOPHILIZED, FOR SOLUTION INTRAMUSCULAR; INTRAVENOUS EVERY 12 HOURS SCHEDULED
Status: DISCONTINUED | OUTPATIENT
Start: 2023-12-13 | End: 2023-12-14

## 2023-12-13 RX ADMIN — ZANUBRUTINIB 160 MG: 80 CAPSULE, GELATIN COATED ORAL at 21:25

## 2023-12-13 RX ADMIN — HYDROCHLOROTHIAZIDE 25 MG: 25 TABLET ORAL at 09:10

## 2023-12-13 RX ADMIN — CARVEDILOL 25 MG: 12.5 TABLET, FILM COATED ORAL at 09:10

## 2023-12-13 RX ADMIN — CARVEDILOL 25 MG: 12.5 TABLET, FILM COATED ORAL at 16:43

## 2023-12-13 RX ADMIN — ASPIRIN 81 MG: 81 TABLET, COATED ORAL at 09:10

## 2023-12-13 RX ADMIN — HYDROXYZINE HYDROCHLORIDE 25 MG: 25 TABLET, FILM COATED ORAL at 09:10

## 2023-12-13 RX ADMIN — LEVALBUTEROL HYDROCHLORIDE 1.25 MG: 1.25 SOLUTION RESPIRATORY (INHALATION) at 19:48

## 2023-12-13 RX ADMIN — HEPARIN SODIUM 5000 UNITS: 5000 INJECTION INTRAVENOUS; SUBCUTANEOUS at 05:13

## 2023-12-13 RX ADMIN — LEVALBUTEROL HYDROCHLORIDE 1.25 MG: 1.25 SOLUTION RESPIRATORY (INHALATION) at 13:30

## 2023-12-13 RX ADMIN — CEFTRIAXONE 1000 MG: 1 INJECTION, SOLUTION INTRAVENOUS at 19:57

## 2023-12-13 RX ADMIN — METHYLPREDNISOLONE SODIUM SUCCINATE 40 MG: 40 INJECTION, POWDER, FOR SOLUTION INTRAMUSCULAR; INTRAVENOUS at 21:04

## 2023-12-13 RX ADMIN — VILAZODONE HYDROCHLORIDE 40 MG: 20 TABLET ORAL at 09:15

## 2023-12-13 RX ADMIN — METHYLPREDNISOLONE SODIUM SUCCINATE 40 MG: 40 INJECTION, POWDER, FOR SOLUTION INTRAMUSCULAR; INTRAVENOUS at 05:13

## 2023-12-13 RX ADMIN — BUDESONIDE AND FORMOTEROL FUMARATE DIHYDRATE 2 PUFF: 160; 4.5 AEROSOL RESPIRATORY (INHALATION) at 17:25

## 2023-12-13 RX ADMIN — GUAIFENESIN 600 MG: 600 TABLET ORAL at 17:24

## 2023-12-13 RX ADMIN — PANTOPRAZOLE SODIUM 40 MG: 40 TABLET, DELAYED RELEASE ORAL at 09:10

## 2023-12-13 RX ADMIN — IPRATROPIUM BROMIDE 0.5 MG: 0.5 SOLUTION RESPIRATORY (INHALATION) at 13:30

## 2023-12-13 RX ADMIN — MAGNESIUM SULFATE HEPTAHYDRATE 2 G: 40 INJECTION, SOLUTION INTRAVENOUS at 01:56

## 2023-12-13 RX ADMIN — BUDESONIDE AND FORMOTEROL FUMARATE DIHYDRATE 2 PUFF: 160; 4.5 AEROSOL RESPIRATORY (INHALATION) at 09:16

## 2023-12-13 RX ADMIN — BUPROPION HYDROCHLORIDE 75 MG: 75 TABLET, FILM COATED ORAL at 09:16

## 2023-12-13 RX ADMIN — HEPARIN SODIUM 5000 UNITS: 5000 INJECTION INTRAVENOUS; SUBCUTANEOUS at 21:04

## 2023-12-13 RX ADMIN — GUAIFENESIN 600 MG: 600 TABLET ORAL at 09:10

## 2023-12-13 RX ADMIN — IPRATROPIUM BROMIDE 0.5 MG: 0.5 SOLUTION RESPIRATORY (INHALATION) at 19:48

## 2023-12-13 RX ADMIN — HEPARIN SODIUM 5000 UNITS: 5000 INJECTION INTRAVENOUS; SUBCUTANEOUS at 13:35

## 2023-12-13 RX ADMIN — IPRATROPIUM BROMIDE 0.5 MG: 0.5 SOLUTION RESPIRATORY (INHALATION) at 09:00

## 2023-12-13 NOTE — ASSESSMENT & PLAN NOTE
Presented to the ED initially requiring BiPAP for increased work of breathing  Chronically requires 4L NC for COPD history  Initial VBG: pH 7.2, pCO2 131.7, HCO3 53  Repeat improved after BiPAP: pH 7.3, pCO2 71.5, HCO3 39.9  Per chart review, patient with chronic hypercapnia  Will trial BiPAP at night  Repeat AM VBG

## 2023-12-13 NOTE — ED NOTES
Pt requesting to remove bipap at this time. Pt doesn't want it on. Provider made aware. Bipap removed, pt placed back on 4L o2 nc at this time. Pt incontinent, cleaned, changed, and purewick placed.       Corey Willson RN  12/13/23 9489

## 2023-12-13 NOTE — ASSESSMENT & PLAN NOTE
Was initially requiring bipap, now on 4L.  Was recently discharged on home O2  Cont symbicort inhaler  Cont with albuterol and atrovent nebs  IV solumdedrol 40mg Q8h, de-escalate as there is an improvement  Consult pulmonary  XR on my review shows no PNA  Cont ceftriaxone considering second exacerbation in a month and that she met SIRS criteria on admission  Procal negative, follow cultures

## 2023-12-13 NOTE — ASSESSMENT & PLAN NOTE
Patient presents per EMS to the ED for increased work of breathing. Patient had a recent admission for COPD last month. Patient reports that about 2 weeks ago she had an upper respiratory virus, but denies any flulike symptoms over the last few days. Patient reports that her shortness of breath has been getting worse over the last 2 days. Denies any fever/chills, chest pain, abdominal pain, nausea or vomiting.   Initially requiring BiPAP on arrival, now titrated down to 4L NC (which is baseline)  Viral panel negative  CXR pending  Continue home inhalers, scheduled nebs, IV solumedrol, ceftriaxone  Respiratory failure with hypercapnia, will trial BiPAP again overnight with repeat VBG (see tx below)  Respiratory protocol  Continuous pulse ox

## 2023-12-13 NOTE — PROGRESS NOTES
[address]  Progress Note  Name: Davis Wilson  MRN: [id number]  Unit/Bed#: ED 12 I Date of Admission: 12/12/2023   Date of Service: 12/13/2023 I Hospital Day: 1    Assessment/Plan   * Chronic obstructive pulmonary disease with acute exacerbation ([address])  Assessment & Plan  Was initially requiring bipap, now on 4L. Was recently discharged on home O2  Cont symbicort inhaler  Cont with albuterol and atrovent nebs  IV solumdedrol 40mg Q8h, de-escalate as there is an improvement  Consult pulmonary  XR on my review shows no PNA  Cont ceftriaxone considering second exacerbation in a month and that she met SIRS criteria on admission  Procal negative, follow cultures    Acute on chronic respiratory failure with hypoxia and hypercapnia (HCC)  Assessment & Plan  Presented to the ED initially requiring BiPAP for increased work of breathing  Chronically requires 4L NC for COPD history, now on 4L   Pco2 improved with bipap  Per chart review, patient with chronic hypercapnia      Social problem  Assessment & Plan  Discussed with pts son and he tells me AOA is involved , CM informed      SIRS (systemic inflammatory response syndrome) ([address])  Assessment & Plan  Procal negative  No leukoytosis  Will consider DC anitbiotics if cultures negative    CAD (coronary artery disease), native coronary artery  Assessment & Plan  Stable, Denies any chest pain  Continue asa and coreg    Essential hypertension  Assessment & Plan  /84   Pulse (!) 107   Temp 97.8 °F (36.6 °C) (Oral)   Resp (!) 25   Ht 5' 8" (1.727 m)   Wt 57.3 kg (126 lb 5.2 oz)   SpO2 93%   BMI 19.21 kg/m²   Elevated pressures this AM  Continue home anti-hypertensive medications. Prn iv meds for sbp >180  Monitor vitals per routine         VTE Pharmacologic Prophylaxis: VTE Score: 9 High Risk (Score >/= 5) - Pharmacological DVT Prophylaxis Ordered: heparin. Sequential Compression Devices Ordered.     Mobility:      No [facility] data yet    Patient Centered Rounds: I performed bedside rounds with nursing staff today. Discussions with Specialists or Other Care Team Provider: yes pulmonary    Education and Discussions with Family / Patient: Updated  (son) via phone. Total Time Spent on Date of Encounter in care of patient: 67 mins. This time was spent on one or more of the following: performing physical exam; counseling and coordination of care; obtaining or reviewing history; documenting in the medical record; reviewing/ordering tests, medications or procedures; communicating with other healthcare professionals and discussing with patient's family/caregivers. Current Length of Stay: 1 day(s)  Current Patient Status: Inpatient   Certification Statement: The patient will continue to require additional inpatient hospital stay due to ongoing treatment for copd exacerbation and then to determine if pt needs rehab   Discharge Plan:  undecided timeline     Code Status: Level 3 - DNAR and DNI    Subjective:   Seen and examined at bedside  Pt is very anxious and says her son wants her out of the house  Also discussed medical case with her son and he informed me that his mother has some issues and he cannot take her back   Breathing is better than on admssion  No fever or cp       Objective:     Vitals:   Temp (24hrs), Av.8 °F (36.6 °C), Min:97.8 °F (36.6 °C), Max:97.8 °F (36.6 °C)    Temp:  [97.8 °F (36.6 °C)] 97.8 °F (36.6 °C)  HR:  [] 87  Resp:  [14-35] 23  BP: (128-172)/(67-91) 130/68  SpO2:  [88 %-100 %] 97 %  Body mass index is 19.21 kg/m². Input and Output Summary (last 24 hours):      Intake/Output Summary (Last 24 hours) at 2023 1210  Last data filed at 2023 0305  Gross per 24 hour   Intake 50 ml   Output --   Net 50 ml       Physical Exam:   Physical Exam s1,2 (+)  Diminished air b/l  Minimal wheezing audible  A&Ox3  Abd nt nd bs (+) 4 quads  No new focal neuro deficit    Additional Data:     Labs:  Results from last 7 days   Lab Units 12/13/23  0513 12/12/23  1541   WBC Thousand/uL 6.01 10.23*   HEMOGLOBIN g/dL 10.5* 12.0   HEMATOCRIT % 33.5* 38.8   PLATELETS Thousands/uL 178 239   NEUTROS PCT %  --  79*   LYMPHS PCT %  --  15   MONOS PCT %  --  3*   EOS PCT %  --  1     Results from last 7 days   Lab Units 12/13/23  0513 12/12/23  1541   SODIUM mmol/L 141 143   POTASSIUM mmol/L 4.1 4.5   CHLORIDE mmol/L 92* 93*   CO2 mmol/L 43* >45*   BUN mg/dL 21 19   CREATININE mg/dL 0.86 0.91   ANION GAP mmol/L 6  --    CALCIUM mg/dL 9.8 10.2   ALBUMIN g/dL  --  4.2   TOTAL BILIRUBIN mg/dL  --  0.36   ALK PHOS U/L  --  141*   ALT U/L  --  4*   AST U/L  --  13   GLUCOSE RANDOM mg/dL 113 153*     Results from last 7 days   Lab Units 12/12/23  1541   INR  0.95             Results from last 7 days   Lab Units 12/13/23  0513 12/12/23  2132   LACTIC ACID mmol/L  --  1.0   PROCALCITONIN ng/ml <0.05 <0.05       Lines/Drains:  Invasive Devices       Peripheral Intravenous Line  Duration             Peripheral IV 12/12/23 Left Antecubital <1 day    Peripheral IV 12/12/23 Right Antecubital <1 day                      Telemetry:  Telemetry Orders (From admission, onward)               24 Hour Telemetry Monitoring  Continuous x 24 Hours (Telem)        Question:  Reason for 24 Hour Telemetry  Answer:  Arrhythmias requiring acute medical intervention / PPM or ICD malfunction                     Telemetry Reviewed: Sinus Tachycardia  Indication for Continued Telemetry Use: No indication for continued use. Will discontinue. Imaging: Reviewed radiology reports from this admission including: chest xray    Recent Cultures (last 7 days):   Results from last 7 days   Lab Units 12/12/23  1625   BLOOD CULTURE  Received in Microbiology Lab. Culture in Progress. Received in Microbiology Lab. Culture in Progress.        Last 24 Hours Medication List:   Current Facility-Administered Medications   Medication Dose Route Frequency Provider Last Rate acetaminophen  650 mg Oral Q6H PRN Delilah Nagy PA-C      albuterol  2 puff Inhalation Q6H PRN Delilah Nagy PA-C      aspirin  81 mg Oral Daily Margot Carlisle PA-C      budesonide-formoterol  2 puff Inhalation BID Delilah Nagy PA-C      buPROPion  75 mg Oral Daily Margot Carlisle PA-C      carvedilol  25 mg Oral BID With Meals Delilah Nagy PA-C      cefTRIAXone  1,000 mg Intravenous Q24H Delilah Nagy PA-C Stopped (12/12/23 2311)    guaiFENesin  600 mg Oral BID Margot Carlisle PA-C      heparin (porcine)  5,000 Units Subcutaneous Q8H [address] Margot Carlisle PA-C      hydrALAZINE  5 mg Intravenous Q6H PRN Samreen Rawls MD      hydrochlorothiazide  25 mg Oral Daily Margot Carlisle PA-C      hydrOXYzine HCL  25 mg Oral Q6H PRN Margot Carlisle PA-C      ipratropium  0.5 mg Nebulization TID Delilah Nagy PA-C      levalbuterol  1.25 mg Nebulization TID Jahaira Blackwood PA-C      melatonin  3 mg Oral HS PRN Delilah Nagy PA-C      methylPREDNISolone sodium succinate  40 mg Intravenous Q12H [address]  Herve Jerez PA-C      pantoprazole  40 mg Oral Daily Margot Carlisle PA-C      vilazodone  40 mg Oral Daily With Breakfast Delialh Nagy PA-C          Today, Patient Was Seen By: Samreen Rawls MD    **Please Note: This note may have been constructed using a voice recognition system. **

## 2023-12-13 NOTE — H&P
[address]  H&P  Name: Alexsandra Nunez 69 y.o. female I MRN: [id number]  Unit/Bed#: ED 15 I Date of Admission: 12/12/2023   Date of Service: 12/12/2023 I Hospital Day: 0      Assessment/Plan   Chronic obstructive pulmonary disease with acute exacerbation [facility])  Assessment & Plan  Patient presents per EMS to the ED for increased work of breathing. Patient had a recent admission for COPD last month. Patient reports that about 2 weeks ago she had an upper respiratory virus, but denies any flulike symptoms over the last few days. Patient reports that her shortness of breath has been getting worse over the last 2 days. Denies any fever/chills, chest pain, abdominal pain, nausea or vomiting.   Initially requiring BiPAP on arrival, now titrated down to 4L NC (which is baseline)  Viral panel negative  CXR pending  Continue home inhalers, scheduled nebs, IV solumedrol, ceftriaxone  Respiratory failure with hypercapnia, will trial BiPAP again overnight with repeat VBG (see tx below)  Respiratory protocol  Continuous pulse ox    SIRS (systemic inflammatory response syndrome) (HCC)  Assessment & Plan  Meeting SIRS for tachypnea and tachycardia  In the setting of COPD exacerbation  CXR pending  Start on IV ceftriaxone  Obtain BC x2, procalcitonin, lactic acid    Acute on chronic respiratory failure with hypoxia and hypercapnia (HCC)  Assessment & Plan  Presented to the ED initially requiring BiPAP for increased work of breathing  Chronically requires 4L NC for COPD history  Initial VBG: pH 7.2, pCO2 131.7, HCO3 53  Repeat improved after BiPAP: pH 7.3, pCO2 71.5, HCO3 39.9  Per chart review, patient with chronic hypercapnia  Will trial BiPAP at night  Repeat AM VBG    CAD (coronary artery disease), native coronary artery  Assessment & Plan  Denies any chest pain  Continue asa and coreg    Essential hypertension  Assessment & Plan  BP stable on admission  Continue home anti-hypertensive medications  Monitor vitals per routine         VTE Pharmacologic Prophylaxis: VTE Score: 9 Moderate Risk (Score 3-4) - Pharmacological DVT Prophylaxis Ordered: heparin. Code Status: Level 3 - DNAR and DNI   Discussion with family:  Update in the AM.     Anticipated Length of Stay: Patient will be admitted on an inpatient basis with an anticipated length of stay of greater than 2 midnights secondary to COPD exacerbation. Chief Complaint: SOB    History of Present Illness:  Jeovany Pugh is a 69 y.o. female with a PMH of hypertension, coronary artery disease, COPD, chronic respiratory failure. Patient presents per EMS to the ED for increased work of breathing. Patient had a recent admission for COPD last month. Patient reports that about 2 weeks ago she had an upper respiratory virus, but denies any flulike symptoms over the last few days. Patient reports that her shortness of breath has been getting worse over the last 2 days. Denies any fever/chills, chest pain, abdominal pain, nausea or vomiting. Patient requiring medical mission for COPD exacerbation, acute on chronic respiratory failure, SIRS. All patient questions answered to the best of my ability. Review of Systems:  Review of Systems   Constitutional:  Negative for chills and fever. HENT:  Negative for ear pain and sore throat. Eyes:  Negative for pain and visual disturbance. Respiratory:  Positive for shortness of breath and wheezing. Negative for cough. Cardiovascular:  Negative for chest pain and palpitations. Gastrointestinal:  Negative for abdominal pain and vomiting. Genitourinary:  Negative for dysuria and hematuria. Musculoskeletal:  Negative for arthralgias and back pain. Skin:  Negative for color change and rash. Neurological:  Negative for seizures and syncope. All other systems reviewed and are negative.       Past Medical and Surgical History:   Past Medical History:   Diagnosis Date    Acute congestive heart failure ([address]) 8/27/2021    Anxiety     Cardiac disease     Chest pain 8/27/2021    Chronic pain disorder     COPD (chronic obstructive pulmonary disease) (HCC)     Depression     Heart disease     Hyperlipidemia     Hypertension     MI (myocardial infarction) ([facility])     MRSA (methicillin resistant Staphylococcus aureus)     Renal disorder     benign kidney tumor       Past Surgical History:   Procedure Laterality Date    APPENDECTOMY      ELBOW BURSA SURGERY Left 02/2018    D/T MRSA INFECTION    SPINAL FUSION      L7 L9       Meds/Allergies:  Prior to Admission medications    Medication Sig Start Date End Date Taking? Authorizing Provider   albuterol (VENTOLIN HFA) 90 mcg/act inhaler Inhale 2 puffs every 6 (six) hours as needed for wheezing 1/7/20   Katerine Garcia PA-C   aspirin (ECOTRIN LOW STRENGTH) 81 mg EC tablet Take 1 tablet (81 mg total) by mouth daily 8/31/21 9/30/21  Hina Hopper MD   budesonide-formoterol [facility]) 160-4.5 mcg/act inhaler Inhale 2 puffs 2 (two) times a day Rinse mouth after use.  1/7/20   Katerine Garcia PA-C   buPROPion [facility]) 75 mg tablet Take 75 mg by mouth in the morning    Historical Provider, MD   Calcium-Magnesium-Vitamin D (CALCIUM 500 PO) Take 1,000 mg by mouth daily    Historical Provider, MD   cariprazine (Vraylar) 1.5 MG capsule Take 1.5 mg by mouth daily    Historical Provider, MD   carvedilol (COREG) 25 mg tablet Take 25 mg by mouth 2 (two) times a day with meals    Historical Provider, MD   guaiFENesin (MUCINEX) 600 mg 12 hr tablet Take 1 tablet (600 mg total) by mouth 2 (two) times a day 12/23/18   Sajan Ren MD   hydrochlorothiazide (HYDRODIURIL) 25 mg tablet Take 1 tablet (25 mg total) by mouth daily 1/7/20   Katerine Garcia PA-C   HYDROcodone-acetaminophen (NORCO) 5-325 mg per tablet Take 1 tablet by mouth every 6 (six) hours as needed for pain    Historical Provider, MD   hydrOXYzine HCL (ATARAX) 25 mg tablet Take 1 tablet (25 mg total) by mouth every 6 (six) hours as needed for anxiety 23   Linard Nageotte, CRNP   ipratropium (ATROVENT) 0.02 % nebulizer solution Take 2.5 mL (0.5 mg total) by nebulization 3 (three) times a day 23  Linard Nageotte, CRNP   lidocaine (LIDODERM) 5 % Apply 2 patches topically over 12 hours daily for 10 days Remove & Discard patch within 12 hours or as directed by MD 23  Gerri Saldana MD   melatonin 3 mg Take 1 tablet (3 mg total) by mouth daily at bedtime as needed (insomnia) 20   TAMMIE Leavitt   nitroglycerin (NITROSTAT) 0.4 mg SL tablet Place 0.4 mg under the tongue every 5 (five) minutes as needed for chest pain    Historical Provider, MD   pantoprazole (PROTONIX) 40 mg tablet Take 1 tablet (40 mg total) by mouth daily 11/3/23   Pam Tirado MD   vilazodone (VIIBRYD) 40 mg tablet Take 40 mg by mouth daily with breakfast    Historical Provider, MD   Zanubrutinib 80 MG CAPS Take 160 mg by mouth 2 (two) times a day    Historical Provider, MD VALDEZ have reviewed home medications using recent Epic encounter. Allergies:    Allergies   Allergen Reactions    Sulfa Antibiotics Anaphylaxis    Iron GI Intolerance    Niacin     Statins Other (See Comments)     cramps       Social History:  Marital Status:    Occupation: NA  Patient Pre-hospital Living Situation: Home  Patient Pre-hospital Level of Mobility: unable to be assessed at time of evaluation  Patient Pre-hospital Diet Restrictions: None  Substance Use History:   Social History     Substance and Sexual Activity   Alcohol Use Never     Social History     Tobacco Use   Smoking Status Former    Packs/day: 1.00    Years: 60.00    Total pack years: 60.00    Types: Cigarettes    Quit date:     Years since quittin.9   Smokeless Tobacco Never   Tobacco Comments    She has close to a 60 pack-year smoking history, most recently has cut down to 4-5 cigarettes daily     Social History     Substance and Sexual Activity   Drug Use No       Family History:  Family History   Problem Relation Age of Onset    Heart disease Mother     Cancer Father        Physical Exam:     Vitals:   Blood Pressure: 137/67 (12/12/23 1800)  Pulse: 93 (12/12/23 1800)  Temperature: 97.8 °F (36.6 °C) (12/12/23 1636)  Temp Source: Oral (12/12/23 1636)  Respirations: 17 (12/12/23 1800)  Height: 5' 8" (172.7 cm) (12/12/23 1636)  Weight - Scale: 57.3 kg (126 lb 5.2 oz) (12/12/23 1636)  SpO2: 100 % (12/12/23 1800)    Physical Exam  Vitals and nursing note reviewed. Constitutional:       Appearance: She is well-developed. She is ill-appearing. HENT:      Head: Normocephalic and atraumatic. Eyes:      Conjunctiva/sclera: Conjunctivae normal.   Cardiovascular:      Rate and Rhythm: Normal rate and regular rhythm. Heart sounds: No murmur heard. Pulmonary:      Effort: Respiratory distress present. Breath sounds: Wheezing present. Comments: 4l nc  Abdominal:      Palpations: Abdomen is soft. Tenderness: There is no abdominal tenderness. Musculoskeletal:         General: No swelling. Cervical back: Neck supple. Skin:     General: Skin is warm and dry. Capillary Refill: Capillary refill takes less than 2 seconds. Neurological:      Mental Status: She is alert. Mental status is at baseline.    Psychiatric:         Mood and Affect: Mood normal.       Additional Data:     Lab Results:  Results from last 7 days   Lab Units 12/12/23  1541   WBC Thousand/uL 10.23*   HEMOGLOBIN g/dL 12.0   HEMATOCRIT % 38.8   PLATELETS Thousands/uL 239   NEUTROS PCT % 79*   LYMPHS PCT % 15   MONOS PCT % 3*   EOS PCT % 1     Results from last 7 days   Lab Units 12/12/23  1541   SODIUM mmol/L 143   POTASSIUM mmol/L 4.5   CHLORIDE mmol/L 93*   CO2 mmol/L >45*   BUN mg/dL 19   CREATININE mg/dL 0.91   CALCIUM mg/dL 10.2   ALBUMIN g/dL 4.2   TOTAL BILIRUBIN mg/dL 0.36   ALK PHOS U/L 141*   ALT U/L 4*   AST U/L 13   GLUCOSE RANDOM mg/dL 153*     Results from last 7 days   Lab Units 12/12/23  1541   INR  0.95                   Lines/Drains:  Invasive Devices       Peripheral Intravenous Line  Duration             Peripheral IV 12/12/23 Left Antecubital <1 day    Peripheral IV 12/12/23 Right Antecubital <1 day                        Imaging: Reviewed radiology reports from this admission including: chest xray  XR chest 1 view portable   ED Interpretation by Darleen Maciel PA-C (12/12 1853)   No acute abnormalities. EKG and Other Studies Reviewed on Admission:   EKG: NSR. HR 98.    ** Please Note: This note has been constructed using a voice recognition system.  **

## 2023-12-13 NOTE — PLAN OF CARE

## 2023-12-13 NOTE — CONSULTS
Consultation - Pulmonary Medicine   Arnaldo Alvarado 69 y.o. female MRN: [id number]  Unit/Bed#: ED 12 Encounter: [id number]      Assessment:  Acute on chronic hypoxemic and hypercapnic respiratory failure  COPD with acute exacerbation  Anxiety    Plan:   Acute on chronic hypoxemic and hypercapnic respiratory failure in the setting of COPD exacerbation, also suspect anxiety playing a part in her symptoms  Chest x-ray is clear  She did require BiPAP initially but is currently on 4 L nasal cannula which has been her baseline  Hypercapnia did improve with the use of the BiPAP, suspect she has chronic hypercapnia at baseline and would benefit from noninvasive ventilator at night, not currently interested in this at this time-can address as an outpatient. Continue Symbicort, Atrovent, will add Xopenex as well, continue Mucinex  Can consider monitoring off antibiotics  Will decrease Solu-Medrol to 40 every 12 hours, can likely do a quick taper and transition to prednisone tomorrow, minimal wheezing on exam currently  She will need outpatient follow-up for PFTs, COPD management  Will continue to follow    History of Present Illness   Physician Requesting Consult: Saturnino Ramirez MD  Reason for Consult / Principal Problem: COPD exacerbation  Hx and PE limited by: None  HPI: Arnaldo Alvarado is a 69y.o. year old female former smoker with past medical history of COPD, chronic respiratory failure, hypertension, CHF, CAD who presents with complaint of worsening shortness of breath over the past 3 days or so associated with a cough which is mostly dry. She is also having a lot of stress at home which is likely contributing to her shortness of breath. She is feeling slightly better from prior to her admission this morning. She was on BiPAP briefly but requested to be off the BiPAP. She does have a history of COPD, uses 4 L of oxygen at baseline. She does use Symbicort as well as albuterol as needed.   Has not been seen by us as an outpatient but has had a couple of hospitalizations over the years. Inpatient consult to Pulmonology  Consult performed by: Dillan Benitez PA-C  Consult ordered by: Bryson Martell MD          Review of Systems   Constitutional: Negative. HENT: Negative. Respiratory:  Positive for cough and shortness of breath. Cardiovascular: Negative. Gastrointestinal: Negative. Genitourinary: Negative. Musculoskeletal: Negative. Skin: Negative. Allergic/Immunologic: Negative. Neurological: Negative. Psychiatric/Behavioral: Negative.          Historical Information   Past Medical History:   Diagnosis Date    Acute congestive heart failure ([address]) 2021    Anxiety     Cardiac disease     Chest pain 2021    Chronic pain disorder     COPD (chronic obstructive pulmonary disease) (HCC)     Depression     Heart disease     Hyperlipidemia     Hypertension     MI (myocardial infarction) ([address])     MRSA (methicillin resistant Staphylococcus aureus)     Renal disorder     benign kidney tumor     Past Surgical History:   Procedure Laterality Date    APPENDECTOMY      ELBOW BURSA SURGERY Left 2018    D/T MRSA INFECTION    SPINAL FUSION      L7 L9     Social History   Social History     Substance and Sexual Activity   Alcohol Use Never     Social History     Substance and Sexual Activity   Drug Use No     E-Cigarette/Vaping    E-Cigarette Use Never User      E-Cigarette/Vaping Substances    Nicotine No     THC No     CBD No     Flavoring No     Other No     Unknown No      Social History     Tobacco Use   Smoking Status Former    Current packs/day: 0.00    Average packs/day: 1 pack/day for 60.0 years (60.0 ttl pk-yrs)    Types: Cigarettes    Start date:     Quit date:     Years since quittin.9   Smokeless Tobacco Never   Tobacco Comments    She has close to a 60 pack-year smoking history, most recently has cut down to 4-5 cigarettes daily     Occupational History:     Family History:   Family History   Problem Relation Age of Onset    Heart disease Mother     Cancer Father        Meds/Allergies   all current active meds have been reviewed, pertinent pulmonary meds have been reviewed, and current meds:   Current Facility-Administered Medications   Medication Dose Route Frequency    acetaminophen (TYLENOL) tablet 650 mg  650 mg Oral Q6H PRN    albuterol (PROVENTIL HFA,VENTOLIN HFA) inhaler 2 puff  2 puff Inhalation Q6H PRN    aspirin (ECOTRIN LOW STRENGTH) EC tablet 81 mg  81 mg Oral Daily    budesonide-formoterol (SYMBICORT) 160-4.5 mcg/act inhaler 2 puff  2 puff Inhalation BID    buPROPion (WELLBUTRIN) tablet 75 mg  75 mg Oral Daily    carvedilol (COREG) tablet 25 mg  25 mg Oral BID With Meals    cefTRIAXone (ROCEPHIN) IVPB (premix in dextrose) 1,000 mg 50 mL  1,000 mg Intravenous Q24H    guaiFENesin (MUCINEX) 12 hr tablet 600 mg  600 mg Oral BID    heparin (porcine) subcutaneous injection 5,000 Units  5,000 Units Subcutaneous Q8H ROSELINE    hydrALAZINE (APRESOLINE) injection 5 mg  5 mg Intravenous Q6H PRN    hydrochlorothiazide (HYDRODIURIL) tablet 25 mg  25 mg Oral Daily    hydrOXYzine HCL (ATARAX) tablet 25 mg  25 mg Oral Q6H PRN    ipratropium (ATROVENT) 0.02 % inhalation solution 0.5 mg  0.5 mg Nebulization TID    levalbuterol (XOPENEX) inhalation solution 1.25 mg  1.25 mg Nebulization TID    melatonin tablet 3 mg  3 mg Oral HS PRN    methylPREDNISolone sodium succinate (Solu-MEDROL) injection 40 mg  40 mg Intravenous Q12H ROSELINE    pantoprazole (PROTONIX) EC tablet 40 mg  40 mg Oral Daily    vilazodone (VIIBRYD) tablet 40 mg  40 mg Oral Daily With Breakfast       Allergies   Allergen Reactions    Sulfa Antibiotics Anaphylaxis    Iron GI Intolerance    Niacin     Statins Other (See Comments)     cramps       Objective   Vitals: Blood pressure 130/68, pulse 87, temperature 97.8 °F (36.6 °C), temperature source Oral, resp.  rate (!) 23, height 5' 8" (1.727 m), weight 57.3 kg (126 lb 5.2 oz), SpO2 97%, not currently breastfeeding. ,Body mass index is 19.21 kg/m². Intake/Output Summary (Last 24 hours) at 12/13/2023 1157  Last data filed at 12/13/2023 0305  Gross per 24 hour   Intake 50 ml   Output --   Net 50 ml     Invasive Devices       Peripheral Intravenous Line  Duration             Peripheral IV 12/12/23 Left Antecubital <1 day    Peripheral IV 12/12/23 Right Antecubital <1 day                    Physical Exam  Pulmonary:      Breath sounds: Decreased breath sounds and wheezing present. Lab Results: I have personally reviewed pertinent lab results. , CBC:   Lab Results   Component Value Date    WBC 6.01 12/13/2023    HGB 10.5 (L) 12/13/2023    HCT 33.5 (L) 12/13/2023     (H) 12/13/2023     12/13/2023    RBC 3.29 (L) 12/13/2023    MCH 31.9 12/13/2023    MCHC 31.3 (L) 12/13/2023    RDW 12.6 12/13/2023    MPV 10.0 12/13/2023    NRBC 0 12/12/2023   , CMP:   Lab Results   Component Value Date    SODIUM 141 12/13/2023    K 4.1 12/13/2023    CL 92 (L) 12/13/2023    CO2 43 (H) 12/13/2023    BUN 21 12/13/2023    CREATININE 0.86 12/13/2023    CALCIUM 9.8 12/13/2023    AST 13 12/12/2023    ALT 4 (L) 12/12/2023    ALKPHOS 141 (H) 12/12/2023    EGFR 65 12/13/2023     Imaging Studies: I have personally reviewed pertinent reports. and I have personally reviewed pertinent films in PACS  EKG, Pathology, and Other Studies: I have personally reviewed pertinent reports.     VTE Prophylaxis: Sequential compression device (Venodyne)  and Heparin    Code Status: Level 3 - DNAR and DNI  Advance Directive and Living Will:      Power of :    POLST:

## 2023-12-13 NOTE — ASSESSMENT & PLAN NOTE
Meeting SIRS for tachypnea and tachycardia  In the setting of COPD exacerbation  CXR pending  Start on IV ceftriaxone  Obtain BC x2, procalcitonin, lactic acid

## 2023-12-13 NOTE — RESPIRATORY THERAPY NOTE
RT Ventilator Management Note  Alysha Lawton 69 y.o. female MRN: [id number]  Unit/Bed#: ED 12 Encounter: [id number]      Daily Screen    No data found in the last 10 encounters. 12/13/23 0143   Respiratory Assessment   Assessment Type Assess only   General Appearance Awake   Respiratory Pattern Spontaneous;Assisted   Chest Assessment Chest expansion symmetrical   Resp Comments placed on bipap   O2 Device v60   Non-Invasive Information   O2 Interface Device Face mask   Non-Invasive Ventilation Mode BiPAP   $ Intermittent NIV Yes   SpO2 97 %   $ Pulse Oximetry Spot Check Charge Completed   Non-Invasive Settings   IPAP (cm) 12 cm   EPAP (cm) 6 cm   Rate (Set) 8   FiO2 (%) 40   Pressure Support (cm H2O) 6   Rise Time 2   Inspiratory Time (Set) 1   Non-Invasive Readings   Skin Intervention Skin intact   Total Rate 18   MV (Mech) 9.2   Peak Pressure (Obs) 13   Spontaneous Vt (mL) 477   Leak (lpm) 0   Non-Invasive Alarms   Insp Pressure High (cm H20) 25   Insp Pressure Low (cm H20) 5   Low Insp Pressure Time (sec) 20 sec   MV Low (L/min) 3   Vt High (mL) 1100   Vt Low (mL) 150   High Resp Rate (BPM) 40 BPM   Low Resp Rate (BPM) 8 BPM         Physical Exam:   Assessment Type: Assess only  General Appearance: Awake  Respiratory Pattern: Spontaneous, Assisted  Chest Assessment: Chest expansion symmetrical  Bilateral Breath Sounds:  Inspiratory wheezes, Diminished, Expiratory wheezes  O2 Device: v60      Resp Comments: placed on bipap

## 2023-12-13 NOTE — ASSESSMENT & PLAN NOTE
/84   Pulse (!) 107   Temp 97.8 °F (36.6 °C) (Oral)   Resp (!) 25   Ht 5' 8" (1.727 m)   Wt 57.3 kg (126 lb 5.2 oz)   SpO2 93%   BMI 19.21 kg/m²   Elevated pressures this AM  Continue home anti-hypertensive medications.  Prn iv meds for sbp >180  Monitor vitals per routine

## 2023-12-13 NOTE — ASSESSMENT & PLAN NOTE
Presented to the ED initially requiring BiPAP for increased work of breathing  Chronically requires 4L NC for COPD history, now on 4L   Pco2 improved with bipap  Per chart review, patient with chronic hypercapnia

## 2023-12-14 PROBLEM — R42 DIZZINESS: Status: ACTIVE | Noted: 2023-12-14

## 2023-12-14 LAB
ANION GAP SERPL CALCULATED.3IONS-SCNC: 4 MMOL/L
BUN SERPL-MCNC: 33 MG/DL (ref 5–25)
CALCIUM SERPL-MCNC: 8.8 MG/DL (ref 8.4–10.2)
CHLORIDE SERPL-SCNC: 94 MMOL/L (ref 96–108)
CO2 SERPL-SCNC: 42 MMOL/L (ref 21–32)
CREAT SERPL-MCNC: 0.98 MG/DL (ref 0.6–1.3)
ERYTHROCYTE [DISTWIDTH] IN BLOOD BY AUTOMATED COUNT: 12.8 % (ref 11.6–15.1)
GFR SERPL CREATININE-BSD FRML MDRD: 55 ML/MIN/1.73SQ M
GLUCOSE SERPL-MCNC: 152 MG/DL (ref 65–140)
HCT VFR BLD AUTO: 31.9 % (ref 34.8–46.1)
HGB BLD-MCNC: 10.1 G/DL (ref 11.5–15.4)
MCH RBC QN AUTO: 31.9 PG (ref 26.8–34.3)
MCHC RBC AUTO-ENTMCNC: 31.7 G/DL (ref 31.4–37.4)
MCV RBC AUTO: 101 FL (ref 82–98)
PLATELET # BLD AUTO: 189 THOUSANDS/UL (ref 149–390)
PMV BLD AUTO: 10 FL (ref 8.9–12.7)
POTASSIUM SERPL-SCNC: 3.7 MMOL/L (ref 3.5–5.3)
RBC # BLD AUTO: 3.17 MILLION/UL (ref 3.81–5.12)
SODIUM SERPL-SCNC: 140 MMOL/L (ref 135–147)
WBC # BLD AUTO: 5.93 THOUSAND/UL (ref 4.31–10.16)

## 2023-12-14 PROCEDURE — 94640 AIRWAY INHALATION TREATMENT: CPT

## 2023-12-14 PROCEDURE — 99233 SBSQ HOSP IP/OBS HIGH 50: CPT | Performed by: FAMILY MEDICINE

## 2023-12-14 PROCEDURE — 94760 N-INVAS EAR/PLS OXIMETRY 1: CPT

## 2023-12-14 PROCEDURE — 99232 SBSQ HOSP IP/OBS MODERATE 35: CPT | Performed by: INTERNAL MEDICINE

## 2023-12-14 PROCEDURE — 94664 DEMO&/EVAL PT USE INHALER: CPT

## 2023-12-14 PROCEDURE — 80048 BASIC METABOLIC PNL TOTAL CA: CPT | Performed by: FAMILY MEDICINE

## 2023-12-14 PROCEDURE — 85027 COMPLETE CBC AUTOMATED: CPT | Performed by: FAMILY MEDICINE

## 2023-12-14 RX ORDER — PREDNISONE 20 MG/1
40 TABLET ORAL DAILY
Status: DISCONTINUED | OUTPATIENT
Start: 2023-12-15 | End: 2023-12-15 | Stop reason: HOSPADM

## 2023-12-14 RX ORDER — MECLIZINE HYDROCHLORIDE 25 MG/1
25 TABLET ORAL EVERY 8 HOURS SCHEDULED
Status: DISCONTINUED | OUTPATIENT
Start: 2023-12-14 | End: 2023-12-15 | Stop reason: HOSPADM

## 2023-12-14 RX ADMIN — ASPIRIN 81 MG: 81 TABLET, COATED ORAL at 08:19

## 2023-12-14 RX ADMIN — LEVALBUTEROL HYDROCHLORIDE 1.25 MG: 1.25 SOLUTION RESPIRATORY (INHALATION) at 13:31

## 2023-12-14 RX ADMIN — IPRATROPIUM BROMIDE 0.5 MG: 0.5 SOLUTION RESPIRATORY (INHALATION) at 07:53

## 2023-12-14 RX ADMIN — LEVALBUTEROL HYDROCHLORIDE 1.25 MG: 1.25 SOLUTION RESPIRATORY (INHALATION) at 20:11

## 2023-12-14 RX ADMIN — PANTOPRAZOLE SODIUM 40 MG: 40 TABLET, DELAYED RELEASE ORAL at 08:19

## 2023-12-14 RX ADMIN — LEVALBUTEROL HYDROCHLORIDE 1.25 MG: 1.25 SOLUTION RESPIRATORY (INHALATION) at 07:53

## 2023-12-14 RX ADMIN — HEPARIN SODIUM 5000 UNITS: 5000 INJECTION INTRAVENOUS; SUBCUTANEOUS at 16:12

## 2023-12-14 RX ADMIN — IPRATROPIUM BROMIDE 0.5 MG: 0.5 SOLUTION RESPIRATORY (INHALATION) at 13:31

## 2023-12-14 RX ADMIN — GUAIFENESIN 600 MG: 600 TABLET ORAL at 08:19

## 2023-12-14 RX ADMIN — IPRATROPIUM BROMIDE 0.5 MG: 0.5 SOLUTION RESPIRATORY (INHALATION) at 20:11

## 2023-12-14 RX ADMIN — BUPROPION HYDROCHLORIDE 75 MG: 75 TABLET, FILM COATED ORAL at 08:19

## 2023-12-14 RX ADMIN — CARVEDILOL 25 MG: 12.5 TABLET, FILM COATED ORAL at 08:19

## 2023-12-14 RX ADMIN — CARVEDILOL 25 MG: 12.5 TABLET, FILM COATED ORAL at 16:12

## 2023-12-14 RX ADMIN — MECLIZINE HYDROCHLORIDE 25 MG: 25 TABLET ORAL at 16:12

## 2023-12-14 RX ADMIN — ZANUBRUTINIB 160 MG: 80 CAPSULE, GELATIN COATED ORAL at 17:15

## 2023-12-14 RX ADMIN — MECLIZINE HYDROCHLORIDE 25 MG: 25 TABLET ORAL at 21:37

## 2023-12-14 RX ADMIN — HEPARIN SODIUM 5000 UNITS: 5000 INJECTION INTRAVENOUS; SUBCUTANEOUS at 06:29

## 2023-12-14 RX ADMIN — GUAIFENESIN 600 MG: 600 TABLET ORAL at 17:15

## 2023-12-14 RX ADMIN — HYDROCHLOROTHIAZIDE 25 MG: 25 TABLET ORAL at 08:18

## 2023-12-14 RX ADMIN — ZANUBRUTINIB 160 MG: 80 CAPSULE, GELATIN COATED ORAL at 08:21

## 2023-12-14 RX ADMIN — HEPARIN SODIUM 5000 UNITS: 5000 INJECTION INTRAVENOUS; SUBCUTANEOUS at 21:37

## 2023-12-14 RX ADMIN — Medication 3 MG: at 21:42

## 2023-12-14 RX ADMIN — METHYLPREDNISOLONE SODIUM SUCCINATE 40 MG: 40 INJECTION, POWDER, FOR SOLUTION INTRAMUSCULAR; INTRAVENOUS at 08:18

## 2023-12-14 RX ADMIN — VILAZODONE HYDROCHLORIDE 40 MG: 20 TABLET ORAL at 08:21

## 2023-12-14 RX ADMIN — BUDESONIDE AND FORMOTEROL FUMARATE DIHYDRATE 2 PUFF: 160; 4.5 AEROSOL RESPIRATORY (INHALATION) at 08:31

## 2023-12-14 RX ADMIN — Medication 3 MG: at 01:30

## 2023-12-14 RX ADMIN — BUDESONIDE AND FORMOTEROL FUMARATE DIHYDRATE 2 PUFF: 160; 4.5 AEROSOL RESPIRATORY (INHALATION) at 17:16

## 2023-12-14 NOTE — PROGRESS NOTES
[address]  Progress Note  Name: Sam Cerrato  MRN: [id number]  Unit/Bed#: -01 I Date of Admission: 12/12/2023   Date of Service: 12/14/2023 I Hospital Day: 2    Assessment/Plan   * Chronic obstructive pulmonary disease with acute exacerbation ([address])  Assessment & Plan  Was initially requiring bipap, now on 4L. Was recently discharged on home O2  Cont symbicort inhaler  Cont with albuterol and atrovent nebs  IV solumdedrol 40mg Q12h, de-escalate to PO today as there is an improvement  Consult pulmonary, recommends OP PFTs, BIPAP  XR-  no PNA  DC antibiotics  Procal negative, follow cultures: no growth at 24 hours    Acute on chronic respiratory failure with hypoxia and hypercapnia (HCC)  Assessment & Plan  Presented to the ED initially requiring BiPAP for increased work of breathing, cont bipap HS  Will need to be evaluated for bipap/trelegy OP  Chronically requires 4L NC for COPD history, now on 4L   Pco2 improved with bipap  Per chart review, patient with chronic hypercapnia      Dizziness  Assessment & Plan  Postural   Will check orthostatic BP  Meclizine as there is a suspicion of BPPV    Social problem  Assessment & Plan  Discussed with pts son and he tells me AOA is involved , CM informed      SIRS (systemic inflammatory response syndrome) ([address])  Assessment & Plan  Procal negative  No leukoytosis  DC antibiotics     CAD (coronary artery disease), native coronary artery  Assessment & Plan  Stable, Denies any chest pain  Continue asa and coreg    Essential hypertension  Assessment & Plan  /84   Pulse 99   Temp 97.5 °F (36.4 °C)   Resp 17   Ht 5' 8" (1.727 m)   Wt 56.7 kg (125 lb)   SpO2 96%   BMI 19.01 kg/m²   stable  Continue home anti-hypertensive medications. Prn iv meds for sbp >180  Monitor vitals per routine               VTE Pharmacologic Prophylaxis: VTE Score: 9 High Risk (Score >/= 5) - Pharmacological DVT Prophylaxis Ordered: heparin.  Sequential Compression Devices Ordered. Mobility:   Basic Mobility Inpatient Raw Score: 19  JH-HLM Goal: 6: Walk 10 steps or more  JH-HLM Achieved: 5: Stand (1 or more minutes)  HLM Goal achieved. Continue to encourage appropriate mobility. Patient Centered Rounds: I performed bedside rounds with nursing staff today. Discussions with Specialists or Other Care Team Provider: yes, pulm    Education and Discussions with Family / Patient: Updated  (son) via phone. Current Length of Stay: 2 day(s)  Current Patient Status: Inpatient   Certification Statement: The patient will continue to require additional inpatient hospital stay due to ongoing treatment for copd exacerbation, disposition planning by   Discharge Plan: Anticipate discharge in 24-48 hrs to home vs rehab    Code Status: Level 3 - DNAR and DNI    Subjective:   Seen and examined at bedside  Pt says her sob is much better today  She c/o dizziness on sitting up and moving her head  Dizziness resolves when she lays down and closes her eyes  No loc or headaches or cp or blurry visioni    Objective:     Vitals:   Temp (24hrs), Av.2 °F (36.2 °C), Min:96.9 °F (36.1 °C), Max:97.5 °F (36.4 °C)    Temp:  [96.9 °F (36.1 °C)-97.5 °F (36.4 °C)] 97.5 °F (36.4 °C)  HR:  [] 98  Resp:  [17-27] 17  BP: (124-145)/(67-84) 132/67  SpO2:  [94 %-97 %] 96 %  Body mass index is 19.01 kg/m². Input and Output Summary (last 24 hours):      Intake/Output Summary (Last 24 hours) at 2023 1354  Last data filed at 2023 2100  Gross per 24 hour   Intake 420 ml   Output --   Net 420 ml       Physical Exam:   Physical Exam General- Awake, alert and oriented x 3, looks comfortable  HEENT- Normocephalic, atraumatic, oral mucosa- moist  Neck- Supple, No carotid bruit, no JVD  CVS- Normal S1/ S2, Regular rate and rhythm, No murmur, No edema  Respiratory system- B/L clear breath sounds, no wheezing  Abdomen- Soft, Non distended, no tenderness, Bowel sound- present 4 quads  Genitourinary- No suprapubic tenderness, No CVA tenderness  Skin- No new bruise or rash  Musculoskeletal- No gross deformity  Psych- No acute psychosis  CNS- CN II- XII grossly intact, No acute focal neurologic deficit noted      Additional Data:     Labs:  Results from last 7 days   Lab Units 12/14/23 0449 12/13/23 0513 12/12/23  1541   WBC Thousand/uL 5.93   < > 10.23*   HEMOGLOBIN g/dL 10.1*   < > 12.0   HEMATOCRIT % 31.9*   < > 38.8   PLATELETS Thousands/uL 189   < > 239   NEUTROS PCT %  --   --  79*   LYMPHS PCT %  --   --  15   MONOS PCT %  --   --  3*   EOS PCT %  --   --  1    < > = values in this interval not displayed. Results from last 7 days   Lab Units 12/14/23 0449 12/13/23 0513 12/12/23  1541   SODIUM mmol/L 140   < > 143   POTASSIUM mmol/L 3.7   < > 4.5   CHLORIDE mmol/L 94*   < > 93*   CO2 mmol/L 42*   < > >45*   BUN mg/dL 33*   < > 19   CREATININE mg/dL 0.98   < > 0.91   ANION GAP mmol/L 4   < >  --    CALCIUM mg/dL 8.8   < > 10.2   ALBUMIN g/dL  --   --  4.2   TOTAL BILIRUBIN mg/dL  --   --  0.36   ALK PHOS U/L  --   --  141*   ALT U/L  --   --  4*   AST U/L  --   --  13   GLUCOSE RANDOM mg/dL 152*   < > 153*    < > = values in this interval not displayed. Results from last 7 days   Lab Units 12/12/23  1541   INR  0.95             Results from last 7 days   Lab Units 12/13/23 0513 12/12/23  2132   LACTIC ACID mmol/L  --  1.0   PROCALCITONIN ng/ml <0.05 <0.05       Lines/Drains:  Invasive Devices       Peripheral Intravenous Line  Duration             Peripheral IV 12/12/23 Left Antecubital 1 day    Peripheral IV 12/12/23 Right Antecubital 1 day                          Imaging: No pertinent imaging reviewed. Recent Cultures (last 7 days):   Results from last 7 days   Lab Units 12/12/23  1625   BLOOD CULTURE  No Growth at 24 hrs. No Growth at 24 hrs.        Last 24 Hours Medication List:   Current Facility-Administered Medications   Medication Dose Route Frequency Provider Last Rate    acetaminophen  650 mg Oral Q6H PRN Salas Hein PA-C      albuterol  2 puff Inhalation Q6H PRN Salas Hein PA-C      aspirin  81 mg Oral Daily Margot Carlisle PA-C      budesonide-formoterol  2 puff Inhalation BID Salas Hein PA-C      buPROPion  75 mg Oral Daily Margot Carlisle PA-C      carvedilol  25 mg Oral BID With Meals Salas Hein PA-C      guaiFENesin  600 mg Oral BID Margot Carlisle PA-C      heparin (porcine)  5,000 Units Subcutaneous Q8H [facility] Margot Carlisle PA-C      hydrALAZINE  5 mg Intravenous Q6H PRN Eleanor Snellen, MD      hydrochlorothiazide  25 mg Oral Daily Margot Carlisle PA-C      hydrOXYzine HCL  25 mg Oral Q6H PRN Margot Carlisle PA-C      ipratropium  0.5 mg Nebulization TID Salas Hein PA-C      levalbuterol  1.25 mg Nebulization TID [place] AxANGÉLICA bingham      meclizine  25 mg Oral [facility] Eleanor Snellen, MD      melatonin  3 mg Oral HS PRN Salas Hein PA-C      pantoprazole  40 mg Oral Daily Margot Carlisle PA-C      [START ON 12/15/2023] predniSONE  40 mg Oral Daily Mayda AxANGÉLICA bingham      vilazodone  40 mg Oral Daily With Breakfast Margot Carlisle PA-C      Zanubrutinib  160 mg Oral BID Eleanor Snellen, MD          Today, Patient Was Seen By: Eleanor Snellen, MD    **Please Note: This note may have been constructed using a voice recognition system. **
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
## 2023-12-14 NOTE — PROGRESS NOTES
Progress Note - Pulmonary   Pelon Irizarry 69 y.o. female MRN: [id number]  Unit/Bed#: -01 Encounter: [id number]    Assessment:  Acute on chronic hypoxemic and hypercapnic respiratory failure  COPD with acute exacerbation  Anxiety    Plan:  Acute on chronic hypoxemic and hypercapnic respiratory failure in the setting of COPD exacerbation. Anxiety also contributing to her symptoms  CXR is clear  She did initially require BiPAP, now on her baseline of 4L NC  Continue Symbicort, Xopenex, Atrovent, mucinex  Will transition to prednisone today, can decrease by 10 mg every 3 days  She will need outpatient follow up for PFTs, COPD management  Anticipate discharge in the next 24 hours  We will sign off, please call with questions    Subjective:   Patient resting in bed. She is feeling better with her breathing    Objective:     Vitals: Blood pressure 135/84, pulse 87, temperature (!) 96.9 °F (36.1 °C), resp. rate 17, height 5' 8" (1.727 m), weight 56.7 kg (125 lb), SpO2 95%, not currently breastfeeding. ,Body mass index is 19.01 kg/m².       Intake/Output Summary (Last 24 hours) at 12/14/2023 0820  Last data filed at 12/13/2023 2100  Gross per 24 hour   Intake 420 ml   Output --   Net 420 ml       Invasive Devices       Peripheral Intravenous Line  Duration             Peripheral IV 12/12/23 Left Antecubital 1 day    Peripheral IV 12/12/23 Right Antecubital 1 day                    Physical Exam: /84   Pulse 87   Temp (!) 96.9 °F (36.1 °C)   Resp 17   Ht 5' 8" (1.727 m)   Wt 56.7 kg (125 lb)   SpO2 95%   BMI 19.01 kg/m²   General appearance: alert and oriented, in no acute distress  Head: Normocephalic, without obvious abnormality, atraumatic  Eyes: negative findings: conjunctivae and sclerae normal  Lungs:  faint expiratory wheeze  Heart: regular rate and rhythm  Abdomen: normal findings: soft, non-tender  Extremities:  no edema  Skin:  warm and dry  Neurologic: Mental status: Alert, oriented, thought content appropriate     Labs: I have personally reviewed pertinent lab results. , CBC:   Lab Results   Component Value Date    WBC 5.93 12/14/2023    HGB 10.1 (L) 12/14/2023    HCT 31.9 (L) 12/14/2023     (H) 12/14/2023     12/14/2023    RBC 3.17 (L) 12/14/2023    MCH 31.9 12/14/2023    MCHC 31.7 12/14/2023    RDW 12.8 12/14/2023    MPV 10.0 12/14/2023   , CMP:   Lab Results   Component Value Date    SODIUM 140 12/14/2023    K 3.7 12/14/2023    CL 94 (L) 12/14/2023    CO2 42 (H) 12/14/2023    BUN 33 (H) 12/14/2023    CREATININE 0.98 12/14/2023    CALCIUM 8.8 12/14/2023    EGFR 55 12/14/2023     Imaging and other studies: I have personally reviewed pertinent reports.    and I have personally reviewed pertinent films in PACS

## 2023-12-14 NOTE — CASE MANAGEMENT
Case Management Assessment & Discharge Planning Note    Patient name Kj Spain  Location [address] [facility] [place] 416/-24 MRN [id number]  : 1945 Date 2023       Current Admission Date: 2023  Current Admission Diagnosis:Chronic obstructive pulmonary disease with acute exacerbation [facility])   Patient Active Problem List    Diagnosis Date Noted    Dizziness 2023    Social problem 2023    Acute on chronic respiratory failure with hypoxia and hypercapnia ([address]) 2023    Diverticulitis 10/29/2023    Moderate protein-calorie malnutrition ([address]) 2023    BRBPR (bright red blood per rectum) 2023    Generalized weakness 2023    Staring episodes 2023    [place] macroglobulinemia ([address]) 2023    Severe protein-calorie malnutrition ([address]) 2021    Positive blood culture 2021    Chronic obstructive pulmonary disease with acute exacerbation ([address]) 2021    SIRS (systemic inflammatory response syndrome) ([address]) 2021    Anemia 2021    Abnormal computed tomography angiography (CTA) 2021    Fatigue 2021    Major depressive disorder, recurrent episode, moderate ([address]) 2019    Flank pain 2019    Pneumonia 2019    Sepsis ([address]) 2019    CAD (coronary artery disease), native coronary artery 2018    Chronic respiratory failure with hypoxia ([address]) 2018    Ambulatory dysfunction 2018    Hypokalemia 2018    Essential hypertension 2018    Tobacco abuse 2018      LOS (days): 2  Geometric Mean LOS (GMLOS) (days): 3.6  Days to GMLOS:1.8     OBJECTIVE:  PATIENT READMITTED TO HOSPITAL  Risk of Unplanned Readmission Score: 38.14         Current admission status: Inpatient       Preferred Pharmacy:   [id number] [id number] [facility], [place] - [address] [facility] [address]  Phone: [phone] Fax: [phone], [place] - [address] [facility] [address] Antonio Gal[address]za  [address] [address]  Phone: [phone] Fax: [phone]    Primary Care Provider: Azeb Drew MD    Primary Insurance: MEDICARE  Secondary Insurance:     ASSESSMENT:  [address], [address] - Son   Primary Phone: [phone] (Home)                 Advance Directives  Does patient have a [address]?: Yes  Does patient have Advance Directives?: Yes  Advance Directives: Power of  for health care  Primary Contact: Imer Quintero (Son)  [phone]         Readmission Root Cause  30 Day Readmission: Yes  Who directed you to return to the hospital?: Family  Did you understand whom to contact if you had questions or problems?: Yes  Did you get your prescriptions before you left the hospital?: Yes  Were you able to get your prescriptions filled when you left the hospital?: Yes  Did you take your medications as prescribed?: Yes  Were you able to get to your follow-up appointments?: No, Refused to provide information  During previous admission, was a post-acute recommendation made?: No  Patient was readmitted due to: COPD`  Action Plan: tbd    Patient Information  Admitted from[de-identified] Home  Mental Status: Alert  During Assessment patient was accompanied by: Not accompanied during assessment  Assessment information provided by[de-identified] Patient  Primary Caregiver: Self  Support Systems: Александр Alexander of Residence: [address] [place] [address] do you live in?: [address] entry access options.  Select all that apply.: Stairs  Number of steps to enter home.: 4  Do the steps have railings?: Yes  Type of Current Residence: [facility]  Living Arrangements: Lives w/ Son  Is patient a ?: No    Activities of Daily Living Prior to Admission  Functional Status: Independent  Completes ADLs independently?: Yes  Ambulates independently?: Yes  Does patient use assisted devices?: Yes  Assisted Devices (DME) used: Home Oxygen concentrator, Beata Ball, Wheelchair, [employer] Chair  Does patient currently own DME?: Yes  What DME does the patient currently own?: Home Oxygen concentrator, Shower Chair, Walker, Wheelchair  Does patient have a history of Outpatient Therapy (PT/OT)?: No  Does the patient have a history of Short-Term Rehab?: Yes ([facility])  Does patient have a history of HHC?: Yes ([address])  Does patient currently have [facility]?: Yes    Current Home Health Care  Type of Current Home Care Services: Nurse visit, Home PT, [address][de-identified] Other (please enter name in comment) ([address])  [address] Provider[de-identified] PCP    Patient Information Continued  Income Source: SSI/SSD  Does patient have prescription coverage?: Yes  Does patient receive dialysis treatments?: Yes  Does patient have a history of substance abuse?: No         Means of Transportation  Means of Transport to Appts[de-identified] Family transport      Housing Stability: Low Risk  (12/14/2023)    Housing Stability Vital Sign     Unable to Pay for Housing in the Last Year: No     Number of [address] in the Last Year: 2     Unstable Housing in the Last Year: No   Food Insecurity: No Food Insecurity (12/14/2023)    Hunger Vital Sign     Worried About Running Out of Food in the Last Year: Never true     Ran Out of Food in the Last Year: Never true   Transportation Needs: No Transportation Needs (12/14/2023)    PRAPARE - Transportation     Lack of Transportation (Medical): No     Lack of Transportation (Non-Medical): No   Utilities: Not At Risk (12/14/2023)    [facility] Utilities     Threatened with loss of utilities: No       DISCHARGE DETAILS:    Discharge planning discussed with[de-identified] Pt at bedside  [place] of Choice: Yes  Comments - Freedom of Choice: CM met with pt at bedside and introduced self/role. Pt is alert and oriented x3 able to make her needs known and encouraged to do.  Pt states she has an argument with her son however will let him "cool off" as she is currently in the hospital. Pt states that she is involved with APS. CM contacted [facility] [phone] who confirmed they are following pt. As per Samina, pt and son are " oil and water" however pt has a home in [facility] that she can return to however does not wish to return. APS states that it is up to pt son if pt is able to return to pt home. CM called pt son rahul and discussed dc plans. As per rahul, pt is able to return to his home once dc. CM informed rahul that pt is anticiapted for dc tomorrow in which pt son states he will be picking pt up after 2pm. Pt son in concern about pt mental capacity and was encouraged to follow up with pt PCP out patient. Pt states she is active with [facility]. Referral sent to confirm care. CM continues to follow.   CM contacted family/caregiver?: Yes  Were Treatment Team discharge recommendations reviewed with patient/caregiver?: Yes  Did patient/caregiver verbalize understanding of patient care needs?: Yes  Were patient/caregiver advised of the risks associated with not following Treatment Team discharge recommendations?: Yes    Contacts  Patient Contacts: [facility]  Relationship to Patient[de-identified] Family  Contact Method: Phone  Phone Number: [phone]  Reason/Outcome: Continuity of Care, Emergency Contact, Discharge [facility]         Is the patient interested in [facility] at discharge?: Yes  [facility] requested[de-identified] Nursing, Occupational Therapy, [facility] Name[de-identified] [facility] External Referral Reason (only applicable if external HHA name selected): Patient has established relationship with provider  [facility] Provider[de-identified] PCP  Home Health Services Needed[de-identified] COPD Management, Evaluate Functional Status and Safety, Oxygen Via Nasal Cannula, Strengthening/Theraputic Exercises to Improve Function  Oxygen LPM Ordered (if applicable based on home health services needed):: 3 LPM  Homebound Criteria Met[de-identified] Requires the Assistance of Another Person for Safe Ambulation or to Leave the Home, Uses an Assist Device (i.e. cane, walker, etc)  Supporting Clincal Findings[de-identified] Limited Endurance, Requires Oxygen, Fatigues Easliy in Short Distances              Would you like to participate in our [facility] [a]list games service program?  : No - Declined    Treatment Team Recommendation: Home with [address] [facility]  Discharge Destination Plan[de-identified] Home with [facility] at Discharge : Family

## 2023-12-14 NOTE — ASSESSMENT & PLAN NOTE
Presented to the ED initially requiring BiPAP for increased work of breathing, cont bipap HS  Will need to be evaluated for bipap/trelegy OP  Chronically requires 4L NC for COPD history, now on 4L   Pco2 improved with bipap  Per chart review, patient with chronic hypercapnia

## 2023-12-14 NOTE — ASSESSMENT & PLAN NOTE
/84   Pulse 99   Temp 97.5 °F (36.4 °C)   Resp 17   Ht 5' 8" (1.727 m)   Wt 56.7 kg (125 lb)   SpO2 96%   BMI 19.01 kg/m²   stable  Continue home anti-hypertensive medications.  Prn iv meds for sbp >180  Monitor vitals per routine

## 2023-12-14 NOTE — ASSESSMENT & PLAN NOTE
Was initially requiring bipap, now on 4L.  Was recently discharged on home O2  Cont symbicort inhaler  Cont with albuterol and atrovent nebs  IV solumdedrol 40mg Q12h, de-escalate to PO today as there is an improvement  Consult pulmonary, recommends OP PFTs, BIPAP  XR-  no PNA  DC antibiotics  Procal negative, follow cultures: no growth at 24 hours

## 2023-12-14 NOTE — PLAN OF CARE
Problem: Potential for Falls  Goal: Patient will remain free of falls  Description: INTERVENTIONS:  - Educate patient/family on patient safety including physical limitations  - Instruct patient to call for assistance with activity   - Consult OT/PT to assist with strengthening/mobility   - Keep Call bell within reach  - Keep bed low and locked with side rails adjusted as appropriate  - Keep care items and personal belongings within reach  - Initiate and maintain comfort rounds  - Make Fall Risk Sign visible to staff  - Offer Toileting every 2 Hours, in advance of need  - Initiate/Maintain bed alarm  - Obtain necessary fall risk management equipment: call bell within reach  - Apply yellow socks and bracelet for high fall risk patients  - Consider moving patient to room near nurses station  Outcome: Progressing     Problem: PAIN - ADULT  Goal: Verbalizes/displays adequate comfort level or baseline comfort level  Description: Interventions:  - Encourage patient to monitor pain and request assistance  - Assess pain using appropriate pain scale  - Administer analgesics based on type and severity of pain and evaluate response  - Implement non-pharmacological measures as appropriate and evaluate response  - Consider cultural and social influences on pain and pain management  - Notify physician/advanced practitioner if interventions unsuccessful or patient reports new pain  Outcome: Progressing     Problem: INFECTION - ADULT  Goal: Absence or prevention of progression during hospitalization  Description: INTERVENTIONS:  - Assess and monitor for signs and symptoms of infection  - Monitor lab/diagnostic results  - Monitor all insertion sites, i.e. indwelling lines, tubes, and drains  - Monitor endotracheal if appropriate and nasal secretions for changes in amount and color  - [place] appropriate cooling/warming therapies per order  - Administer medications as ordered  - Instruct and encourage patient and family to use good hand hygiene technique  - Identify and instruct in appropriate isolation precautions for identified infection/condition  Outcome: Progressing  Goal: Absence of fever/infection during neutropenic period  Description: INTERVENTIONS:  - Monitor WBC    Outcome: Progressing

## 2023-12-15 VITALS
SYSTOLIC BLOOD PRESSURE: 162 MMHG | TEMPERATURE: 98.6 F | HEART RATE: 90 BPM | HEIGHT: 68 IN | OXYGEN SATURATION: 99 % | RESPIRATION RATE: 20 BRPM | WEIGHT: 125 LBS | DIASTOLIC BLOOD PRESSURE: 79 MMHG | BODY MASS INDEX: 18.94 KG/M2

## 2023-12-15 LAB
ANION GAP SERPL CALCULATED.3IONS-SCNC: 5 MMOL/L
BUN SERPL-MCNC: 25 MG/DL (ref 5–25)
CALCIUM SERPL-MCNC: 8.8 MG/DL (ref 8.4–10.2)
CHLORIDE SERPL-SCNC: 98 MMOL/L (ref 96–108)
CO2 SERPL-SCNC: 40 MMOL/L (ref 21–32)
CREAT SERPL-MCNC: 0.88 MG/DL (ref 0.6–1.3)
ERYTHROCYTE [DISTWIDTH] IN BLOOD BY AUTOMATED COUNT: 12.9 % (ref 11.6–15.1)
GFR SERPL CREATININE-BSD FRML MDRD: 63 ML/MIN/1.73SQ M
GLUCOSE SERPL-MCNC: 79 MG/DL (ref 65–140)
HCT VFR BLD AUTO: 33.2 % (ref 34.8–46.1)
HGB BLD-MCNC: 10.5 G/DL (ref 11.5–15.4)
MCH RBC QN AUTO: 31.8 PG (ref 26.8–34.3)
MCHC RBC AUTO-ENTMCNC: 31.6 G/DL (ref 31.4–37.4)
MCV RBC AUTO: 101 FL (ref 82–98)
PLATELET # BLD AUTO: 183 THOUSANDS/UL (ref 149–390)
PMV BLD AUTO: 10 FL (ref 8.9–12.7)
POTASSIUM SERPL-SCNC: 3.3 MMOL/L (ref 3.5–5.3)
RBC # BLD AUTO: 3.3 MILLION/UL (ref 3.81–5.12)
SODIUM SERPL-SCNC: 143 MMOL/L (ref 135–147)
WBC # BLD AUTO: 8.86 THOUSAND/UL (ref 4.31–10.16)

## 2023-12-15 PROCEDURE — 80048 BASIC METABOLIC PNL TOTAL CA: CPT | Performed by: FAMILY MEDICINE

## 2023-12-15 PROCEDURE — 85027 COMPLETE CBC AUTOMATED: CPT | Performed by: FAMILY MEDICINE

## 2023-12-15 PROCEDURE — 94640 AIRWAY INHALATION TREATMENT: CPT

## 2023-12-15 PROCEDURE — 99239 HOSP IP/OBS DSCHRG MGMT >30: CPT | Performed by: FAMILY MEDICINE

## 2023-12-15 PROCEDURE — 94760 N-INVAS EAR/PLS OXIMETRY 1: CPT

## 2023-12-15 RX ORDER — MECLIZINE HYDROCHLORIDE 25 MG/1
25 TABLET ORAL EVERY 8 HOURS PRN
Qty: 10 TABLET | Refills: 0 | Status: SHIPPED | OUTPATIENT
Start: 2023-12-15 | End: 2023-12-22

## 2023-12-15 RX ORDER — POTASSIUM CHLORIDE 20 MEQ/1
40 TABLET, EXTENDED RELEASE ORAL ONCE
Status: COMPLETED | OUTPATIENT
Start: 2023-12-15 | End: 2023-12-15

## 2023-12-15 RX ORDER — PREDNISONE 20 MG/1
TABLET ORAL DAILY
Qty: 15 TABLET | Refills: 0 | Status: SHIPPED | OUTPATIENT
Start: 2023-12-16 | End: 2023-12-28

## 2023-12-15 RX ORDER — BUDESONIDE AND FORMOTEROL FUMARATE DIHYDRATE 160; 4.5 UG/1; UG/1
2 AEROSOL RESPIRATORY (INHALATION) 2 TIMES DAILY
Qty: 10.2 G | Refills: 0 | Status: SHIPPED | OUTPATIENT
Start: 2023-12-15

## 2023-12-15 RX ADMIN — MECLIZINE HYDROCHLORIDE 25 MG: 25 TABLET ORAL at 06:05

## 2023-12-15 RX ADMIN — BUPROPION HYDROCHLORIDE 75 MG: 75 TABLET, FILM COATED ORAL at 08:31

## 2023-12-15 RX ADMIN — POTASSIUM CHLORIDE 40 MEQ: 1500 TABLET, EXTENDED RELEASE ORAL at 11:36

## 2023-12-15 RX ADMIN — CARVEDILOL 25 MG: 12.5 TABLET, FILM COATED ORAL at 08:29

## 2023-12-15 RX ADMIN — PANTOPRAZOLE SODIUM 40 MG: 40 TABLET, DELAYED RELEASE ORAL at 08:31

## 2023-12-15 RX ADMIN — HYDROCHLOROTHIAZIDE 25 MG: 25 TABLET ORAL at 08:31

## 2023-12-15 RX ADMIN — HEPARIN SODIUM 5000 UNITS: 5000 INJECTION INTRAVENOUS; SUBCUTANEOUS at 06:05

## 2023-12-15 RX ADMIN — IPRATROPIUM BROMIDE 0.5 MG: 0.5 SOLUTION RESPIRATORY (INHALATION) at 07:26

## 2023-12-15 RX ADMIN — PREDNISONE 40 MG: 20 TABLET ORAL at 08:31

## 2023-12-15 RX ADMIN — BUDESONIDE AND FORMOTEROL FUMARATE DIHYDRATE 2 PUFF: 160; 4.5 AEROSOL RESPIRATORY (INHALATION) at 08:32

## 2023-12-15 RX ADMIN — ASPIRIN 81 MG: 81 TABLET, COATED ORAL at 08:31

## 2023-12-15 RX ADMIN — GUAIFENESIN 600 MG: 600 TABLET ORAL at 08:31

## 2023-12-15 RX ADMIN — HYDROXYZINE HYDROCHLORIDE 25 MG: 25 TABLET, FILM COATED ORAL at 00:16

## 2023-12-15 RX ADMIN — LEVALBUTEROL HYDROCHLORIDE 1.25 MG: 1.25 SOLUTION RESPIRATORY (INHALATION) at 07:26

## 2023-12-15 RX ADMIN — ZANUBRUTINIB 160 MG: 80 CAPSULE, GELATIN COATED ORAL at 08:33

## 2023-12-15 RX ADMIN — VILAZODONE HYDROCHLORIDE 40 MG: 20 TABLET ORAL at 08:31

## 2023-12-15 NOTE — PLAN OF CARE

## 2023-12-15 NOTE — ASSESSMENT & PLAN NOTE
Patient presented to the ED with complaints of shortness of breath  Requiring BiPAP initially, now weaned to 3 L of supplemental oxygen.    Continue with Symbicort, Nebulizers  Pulmonology following, appreciate input  Follow up OP for PFTs, BiPAP  Continue with Prednisone taper  No antibiotics recommended at this time

## 2023-12-15 NOTE — NURSING NOTE
AVS reviewed with the patient. Patient educated on COPD exacerbation prevention and proper use of inhaler. Patient demonstrated understanding by performing teach back method. Patient is ready for discharge.

## 2023-12-15 NOTE — DISCHARGE SUMMARY
[address]  Discharge- Jose Din 1945, 69 y.o. female MRN: [id number]  Unit/Bed#: -01 Encounter: [id number]  Primary Care Provider: Rhonda Sawant MD   Date and time admitted to hospital: 12/12/2023  3:32 PM    * Chronic obstructive pulmonary disease with acute exacerbation [facility])  Assessment & Plan  Patient presented to the ED with complaints of shortness of breath  Requiring BiPAP initially, now weaned to 3 L of supplemental oxygen. Continue with Symbicort, Nebulizers  Pulmonology following, appreciate input  Follow up OP for PFTs, BiPAP  Continue with Prednisone taper  No antibiotics recommended at this time    Acute on chronic respiratory failure with hypoxia and hypercapnia (HCC)  Assessment & Plan  Presented to the ED initially requiring BiPAP for increased work of breathing, cont bipap HS  Will need to be evaluated for bipap/trelegy OP  Chronically requires 4L NC for COPD history, now on 4L   Pco2 improved with bipap  Per chart review, patient with chronic hypercapnia      Dizziness  Assessment & Plan  Postural   Orthostatic blood pressures appear negative  Continue Meclizine    Social problem  Assessment & Plan  Discussed with pts son and he tells me AOA is involved , CM informed      SIRS (systemic inflammatory response syndrome) ([address])  Assessment & Plan  Procal negative  No leukoytosis  DC antibiotics     CAD (coronary artery disease), native coronary artery  Assessment & Plan  Stable, Denies any chest pain  Continue asa and coreg    Essential hypertension  Assessment & Plan  /79   Pulse 90   Temp 98.6 °F (37 °C) (Oral)   Resp 20   Ht 5' 8" (1.727 m)   Wt 56.7 kg (125 lb)   SpO2 99%   BMI 19.01 kg/m²   Stable  Continue home anti-hypertensive medications.         Medical Problems       Resolved Problems  Date Reviewed: 12/15/2023   None       Discharging Physician / Practitioner: Mayra Etienne MD  PCP: Rhonda Sawant MD  Admission Date:   Admission Orders (From admission, onward)       Ordered        12/12/23 [address] [place] [address]  Once                          Discharge Date: 12/15/23    Consultations During Hospital Stay:  IP CONSULT TO PULMONOLOGY    Procedures Performed:   none    Significant Findings / Test Results:   none    Incidental Findings:   none     Test Results Pending at Discharge (will require follow up):   none     Outpatient Tests Requested:  none    Complications:  none    Reason for Admission: Shortness of breath and hypoxia    Hospital Course:   Marilyn Marrero is a 69 y.o. female patient who originally presented to the hospital on 12/12/2023 due to COPD exacerbation with acute on chronic respiratory failure. Patient was started on IV steroids which was de-escalated gradually. Now has been transitioned to p.o. and will be tapered by 10 mg every 3 days, starting at 40 mg.  Pulmonary was consulted as this was a readmission. Patient initially required BiPAP which she is off now. She is on her baseline dose of oxygen which is around 4 L. Blood cultures were negative. Pro-James was negative. Antibiotics were discontinued. Patient states that her shortness of breath likely was from panic secondary to social conditions at home. Case management was consulted secondary to her social situation and they have discussed with area of aging and everyone involved  Recommendation is to send patient home with home health under the care of her son. Pulmonary saw the patient and they recommend outpatient high-resolution CT, PFT and echocardiogram.  This has been discussed with the patient and the patient's son in detail    Please see above list of diagnoses and related plan for additional information.      Condition at Discharge: stable    Discharge Day Visit / Exam:   Subjective:  " I feel better with my breathing"  Vitals: Blood Pressure: 162/79 (12/15/23 0738)  Pulse: 90 (12/15/23 0738)  Temperature: 98.6 °F (37 °C) (12/15/23 0738)  Temp Source: Oral (12/15/23 3113)  Respirations: 20 (12/15/23 0738)  Height: 5' 8" (172.7 cm) (12/13/23 1551)  Weight - Scale: 56.7 kg (125 lb) (12/13/23 1551)  SpO2: 99 % (12/15/23 0738)  Exam:   Physical Exam General- Awake, alert and oriented x 3, looks comfortable  HEENT- Normocephalic, atraumatic, oral mucosa- moist  Neck- Supple, No carotid bruit, no JVD  CVS- Normal S1/ S2, Regular rate and rhythm, No murmur, No edema  Respiratory system-diminished bilaterally   abdomen- Soft, Non distended, no tenderness, Bowel sound- present 4 quads  Genitourinary- No suprapubic tenderness, No CVA tenderness  Skin- No new bruise or rash  Musculoskeletal- No gross deformity  Psych- No acute psychosis  CNS- CN II- XII grossly intact, No acute focal neurologic deficit noted      Discussion with Family: Updated  (son) via phone. Discharge instructions/Information to patient and family:   See after visit summary for information provided to patient and family. Provisions for Follow-Up Care:  See after visit summary for information related to follow-up care and any pertinent home health orders. Mobility at time of Discharge:   Basic Mobility Inpatient Raw Score: 19  JH-HLM Goal: 6: Walk 10 steps or more  JH-HLM Achieved: 6: Walk 10 steps or more  HLM Goal achieved. Continue to encourage appropriate mobility. Disposition:   Home with VNA Services (Reminder: Complete face to face encounter)    Planned Readmission: no     Discharge Statement:  I spent 75 minutes discharging the patient. This time was spent on the day of discharge. I had direct contact with the patient on the day of discharge. Greater than 50% of the total time was spent examining patient, answering all patient questions, arranging and discussing plan of care with patient as well as directly providing post-discharge instructions. Additional time then spent on discharge activities.     Discharge Medications:  See after visit summary for reconciled discharge medications provided to patient and/or family.       **Please Note: This note may have been constructed using a voice recognition system**

## 2023-12-15 NOTE — RESPIRATORY THERAPY NOTE
RT Protocol Note  Mitra Caraballo 69 y.o. female MRN: [id number]  Unit/Bed#: -01 Encounter: [id number]    Assessment    Principal Problem:    Chronic obstructive pulmonary disease with acute exacerbation ([address])  Active Problems:    Essential hypertension    CAD (coronary artery disease), native coronary artery    SIRS (systemic inflammatory response syndrome) (HCC)    Acute on chronic respiratory failure with hypoxia and hypercapnia ([address])    Social problem    Dizziness      Home Pulmonary Medications:     12/14/23 2015   Respiratory Protocol   Protocol Initiated? Yes   Protocol Selection Airway Clearance   Language Barrier? No   Medical & Social History Reviewed? Yes   Diagnostic Studies Reviewed? Yes   Physical Assessment Performed? Yes   Airway Clearance Plan Incentive Spirometer   Respiratory Assessment   Assessment Type Assess only   General Appearance Alert; Awake   Respiratory Pattern Dyspnea with exertion   Chest Assessment Chest expansion symmetrical   Bilateral Breath Sounds Diminished   Location Specific No   Cough None   Resp Comments Protocol completed.  Will continue with IS for airway clearence   O2 Device NC   Cough Description   Sputum Amount None   Additional Assessments   Pulse 99   Respirations 20   SpO2 97 %            Past Medical History:   Diagnosis Date    Acute congestive heart failure ([address]) 8/27/2021    Anxiety     Cardiac disease     Chest pain 8/27/2021    Chronic pain disorder     COPD (chronic obstructive pulmonary disease) (HCC)     Depression     Heart disease     Hyperlipidemia     Hypertension     MI (myocardial infarction) ([address])     MRSA (methicillin resistant Staphylococcus aureus)     Renal disorder     benign kidney tumor     Social History     Socioeconomic History    Marital status:      Spouse name: None    Number of children: 3    Years of education: 13    Highest education level: High school graduate   Occupational History    Occupation: [employer]  Employer: [employer]     Comment: retired    Tobacco Use    Smoking status: Former     Current packs/day: 0.00     Average packs/day: 1 pack/day for 60.0 years (60.0 ttl pk-yrs)     Types: Cigarettes     Start date:      Quit date: 2016     Years since quittin.9    Smokeless tobacco: Never    Tobacco comments:     She has close to a 60 pack-year smoking history, most recently has cut down to 4-5 cigarettes daily   Vaping Use    Vaping status: Never Used   Substance and Sexual Activity    Alcohol use: Never    Drug use: No    Sexual activity: Not Currently   Other Topics Concern    None   Social History Narrative    None     Social Determinants of Health     Financial Resource Strain: Medium Risk (1/3/2020)    Overall Financial Resource Strain (CARDIA)     Difficulty of Paying Living Expenses: Somewhat hard   Food Insecurity: No Food Insecurity (2023)    Hunger Vital Sign     Worried About Running Out of Food in the Last Year: Never true     Ran Out of Food in the Last Year: Never true   Transportation Needs: No Transportation Needs (2023)    PRAPARE - Transportation     Lack of Transportation (Medical): No     Lack of Transportation (Non-Medical): No   Physical Activity: Inactive (1/3/2020)    Exercise Vital Sign     Days of Exercise per Week: 0 days     Minutes of Exercise per Session: 0 min   Stress: No Stress Concern Present (1/3/2020)    [facility]     Feeling of Stress :  Only a little   Social Connections: Socially Isolated (1/3/2020)    Social Connection and Isolation Panel [NHANES]     Frequency of Communication with Friends and Family: Twice a week     Frequency of Social Gatherings with Friends and Family: Once a week     Attends Yarsanism Services: Never     Active Member of Clubs or Organizations: No     Attends Club or Organization Meetings: Never     Marital Status:    Intimate Partner Violence: Not At Risk (1/3/2020)    Humiliation, Afraid, Rape, and Kick questionnaire     Fear of Current or Ex-Partner: No     Emotionally Abused: No     Physically Abused: No     Sexually Abused: No   Housing Stability: Low Risk  (12/14/2023)    Housing Stability Vital Sign     Unable to Pay for Housing in the Last Year: No     Number of Places Lived in the Last Year: 2     Unstable Housing in the Last Year: No       Subjective         Objective    Physical Exam:   Assessment Type: Assess only  General Appearance: Alert, Awake  Respiratory Pattern: Dyspnea with exertion  Chest Assessment: Chest expansion symmetrical  Bilateral Breath Sounds: Diminished  Location Specific: No  Cough: None  O2 Device: NC    Vitals:  Blood pressure 134/74, pulse 99, temperature (!) 96 °F (35.6 °C), resp. rate 20, height 5' 8" (1.727 m), weight 56.7 kg (125 lb), SpO2 97%, not currently breastfeeding. Imaging and other studies: I have personally reviewed pertinent reports. O2 Device: NC     Plan       Airway Clearance Plan: Incentive Spirometer     Resp Comments: Protocol completed.  Will continue with IS for airway clearence

## 2023-12-15 NOTE — PLAN OF CARE
Problem: Potential for Falls  Goal: Patient will remain free of falls  Description: INTERVENTIONS:  - Educate patient/family on patient safety including physical limitations  - Instruct patient to call for assistance with activity   - Consult OT/PT to assist with strengthening/mobility   - Keep Call bell within reach  - Keep bed low and locked with side rails adjusted as appropriate  - Keep care items and personal belongings within reach  - Initiate and maintain comfort rounds  - Make Fall Risk Sign visible to staff  - Offer Toileting every 2 Hours, in advance of need  - Initiate/Maintain alarms  - Obtain necessary fall risk management equipment  - Apply yellow socks and bracelet for high fall risk patients  - Consider moving patient to room near nurses station  12/15/2023 1205 by Glen Lilly RN  Outcome: Adequate for Discharge  12/15/2023 0747 by Glen Lilly RN  Outcome: Progressing     Problem: PAIN - ADULT  Goal: Verbalizes/displays adequate comfort level or baseline comfort level  Description: Interventions:  - Encourage patient to monitor pain and request assistance  - Assess pain using appropriate pain scale  - Administer analgesics based on type and severity of pain and evaluate response  - Implement non-pharmacological measures as appropriate and evaluate response  - Consider cultural and social influences on pain and pain management  - Notify physician/advanced practitioner if interventions unsuccessful or patient reports new pain  12/15/2023 1205 by Glen Lilly RN  Outcome: Adequate for Discharge  12/15/2023 0747 by Glen Lilly RN  Outcome: Progressing     Problem: INFECTION - ADULT  Goal: Absence or prevention of progression during hospitalization  Description: INTERVENTIONS:  - Assess and monitor for signs and symptoms of infection  - Monitor lab/diagnostic results  - Monitor all insertion sites, i.e. indwelling lines, tubes, and drains  - Monitor endotracheal if appropriate and nasal secretions for changes in amount and color  - [place] appropriate cooling/warming therapies per order  - Administer medications as ordered  - Instruct and encourage patient and family to use good hand hygiene technique  - Identify and instruct in appropriate isolation precautions for identified infection/condition  12/15/2023 1205 by Sharmin Richter RN  Outcome: Adequate for Discharge  12/15/2023 0747 by Sharmin Richter RN  Outcome: Progressing  Goal: Absence of fever/infection during neutropenic period  Description: INTERVENTIONS:  - Monitor WBC    12/15/2023 1205 by Sharmin Richter RN  Outcome: Adequate for Discharge  12/15/2023 0747 by Sharmin Richter RN  Outcome: Progressing     Problem: SAFETY ADULT  Goal: Patient will remain free of falls  Description: INTERVENTIONS:  - Educate patient/family on patient safety including physical limitations  - Instruct patient to call for assistance with activity   - Consult OT/PT to assist with strengthening/mobility   - Keep Call bell within reach  - Keep bed low and locked with side rails adjusted as appropriate  - Keep care items and personal belongings within reach  - Initiate and maintain comfort rounds  - Make Fall Risk Sign visible to staff  - Offer Toileting every 2 Hours, in advance of need  - Initiate/Maintain alarms  - Obtain necessary fall risk management equipment  - Apply yellow socks and bracelet for high fall risk patients  - Consider moving patient to room near nurses station  12/15/2023 1205 by Sharmin Richter RN  Outcome: Adequate for Discharge  12/15/2023 0747 by Sharmin Richter RN  Outcome: Progressing  Goal: Maintain or return to baseline ADL function  Description: INTERVENTIONS:  -  Assess patient's ability to carry out ADLs; assess patient's baseline for ADL function and identify physical deficits which impact ability to perform ADLs (bathing, care of mouth/teeth, toileting, grooming, dressing, etc.)  - Assess/evaluate cause of self-care deficits   - Assess range of motion  - Assess patient's mobility; develop plan if impaired  - Assess patient's need for assistive devices and provide as appropriate  - Encourage maximum independence but intervene and supervise when necessary  - Involve family in performance of ADLs  - Assess for home care needs following discharge   - Consider OT consult to assist with ADL evaluation and planning for discharge  - Provide patient education as appropriate  12/15/2023 1205 by Autumn Lewis RN  Outcome: Adequate for Discharge  12/15/2023 0747 by Autumn Lewis RN  Outcome: Progressing  Goal: Maintains/Returns to pre admission functional level  Description: INTERVENTIONS:  - Perform AM-PAC 6 Click Basic Mobility/ Daily Activity assessment daily.  - Set and communicate daily mobility goal to care team and patient/family/caregiver. - Collaborate with rehabilitation services on mobility goals if consulted  - Perform Range of Motion 3 times a day. - Reposition patient every 2 hours.   - Dangle patient 3 times a day  - Stand patient 3 times a day  - Ambulate patient 3 times a day  - Out of bed to chair 3 times a day   - Out of bed for meals 3 times a day  - Out of bed for toileting  - Record patient progress and toleration of activity level   12/15/2023 1205 by Autumn Lewis RN  Outcome: Adequate for Discharge  12/15/2023 0747 by Autumn Lewis RN  Outcome: Progressing     Problem: DISCHARGE PLANNING  Goal: Discharge to home or other facility with appropriate resources  Description: INTERVENTIONS:  - Identify barriers to discharge w/patient and caregiver  - Arrange for needed discharge resources and transportation as appropriate  - Identify discharge learning needs (meds, wound care, etc.)  - Arrange for interpretive services to assist at discharge as needed  - Refer to Case Management Department for coordinating discharge planning if the patient needs post-hospital services based on physician/advanced practitioner order or complex needs related to functional status, cognitive ability, or social support system  12/15/2023 1205 by Jessica Lacey RN  Outcome: Adequate for Discharge  12/15/2023 0747 by Jessica Lacey RN  Outcome: Progressing     Problem: Knowledge Deficit  Goal: Patient/family/caregiver demonstrates understanding of disease process, treatment plan, medications, and discharge instructions  Description: Complete learning assessment and assess knowledge base.   Interventions:  - Provide teaching at level of understanding  - Provide teaching via preferred learning methods  12/15/2023 1205 by Jessica Lacey RN  Outcome: Adequate for Discharge  12/15/2023 0747 by Jessica Lacey RN  Outcome: Progressing

## 2023-12-15 NOTE — PLAN OF CARE
Problem: Potential for Falls  Goal: Patient will remain free of falls  Description: INTERVENTIONS:  - Educate patient/family on patient safety including physical limitations  - Instruct patient to call for assistance with activity   - Consult OT/PT to assist with strengthening/mobility   - Keep Call bell within reach  - Keep bed low and locked with side rails adjusted as appropriate  - Keep care items and personal belongings within reach  - Initiate and maintain comfort rounds  - Make Fall Risk Sign visible to staff  - Offer Toileting every 2 Hours, in advance of need  - Initiate/Maintain bed alarm  - Obtain necessary fall risk management equipment  - Apply yellow socks and bracelet for high fall risk patients  - Consider moving patient to room near nurses station  Outcome: Progressing     Problem: SAFETY ADULT  Goal: Patient will remain free of falls  Description: INTERVENTIONS:  - Educate patient/family on patient safety including physical limitations  - Instruct patient to call for assistance with activity   - Consult OT/PT to assist with strengthening/mobility   - Keep Call bell within reach  - Keep bed low and locked with side rails adjusted as appropriate  - Keep care items and personal belongings within reach  - Initiate and maintain comfort rounds  - Make Fall Risk Sign visible to staff  - Offer Toileting every 2 Hours, in advance of need  - Initiate/Maintain bed alarm  - Obtain necessary fall risk management equipment  - Apply yellow socks and bracelet for high fall risk patients  - Consider moving patient to room near nurses station  Outcome: Progressing  Goal: Maintain or return to baseline ADL function  Description: INTERVENTIONS:  -  Assess patient's ability to carry out ADLs; assess patient's baseline for ADL function and identify physical deficits which impact ability to perform ADLs (bathing, care of mouth/teeth, toileting, grooming, dressing, etc.)  - Assess/evaluate cause of self-care deficits - Assess range of motion  - Assess patient's mobility; develop plan if impaired  - Assess patient's need for assistive devices and provide as appropriate  - Encourage maximum independence but intervene and supervise when necessary  - Involve family in performance of ADLs  - Assess for home care needs following discharge   - Consider OT consult to assist with ADL evaluation and planning for discharge  - Provide patient education as appropriate  Outcome: Progressing  Goal: Maintains/Returns to pre admission functional level  Description: INTERVENTIONS:  - Perform AM-PAC 6 Click Basic Mobility/ Daily Activity assessment daily.  - Set and communicate daily mobility goal to care team and patient/family/caregiver. - Collaborate with rehabilitation services on mobility goals if consulted  - Perform Range of Motion 2 times a day. - Reposition patient every 2 hours.   - Dangle patient 2 times a day  - Stand patient 2 times a day  - Ambulate patient 2 times a day  - Out of bed to chair 2 times a day   - Out of bed for meals 2 times a day  - Out of bed for toileting  - Record patient progress and toleration of activity level   Outcome: Progressing

## 2023-12-15 NOTE — CASE MANAGEMENT
Case Management Discharge Planning Note    Patient name Jose Bonilla  Location [address] [facility] [place] 416/-95 MRN [id number]  : 1945 Date 12/15/2023       Current Admission Date: 2023  Current Admission Diagnosis:Chronic obstructive pulmonary disease with acute exacerbation [facility])   Patient Active Problem List    Diagnosis Date Noted    Dizziness 2023    Social problem 2023    Acute on chronic respiratory failure with hypoxia and hypercapnia ([address]) 2023    Diverticulitis 10/29/2023    Moderate protein-calorie malnutrition ([address]) 2023    BRBPR (bright red blood per rectum) 2023    Generalized weakness 2023    Staring episodes 2023    [place] macroglobulinemia ([address]) 2023    Severe protein-calorie malnutrition ([address]) 2021    Positive blood culture 2021    Chronic obstructive pulmonary disease with acute exacerbation ([address]) 2021    SIRS (systemic inflammatory response syndrome) ([address]) 2021    Anemia 2021    Abnormal computed tomography angiography (CTA) 2021    Fatigue 2021    Major depressive disorder, recurrent episode, moderate ([address]) 2019    Flank pain 2019    Pneumonia 2019    Sepsis ([address]) 2019    CAD (coronary artery disease), native coronary artery 2018    Chronic respiratory failure with hypoxia ([address]) 2018    Ambulatory dysfunction 2018    Hypokalemia 2018    Essential hypertension 2018    Tobacco abuse 2018      LOS (days): 3  Geometric Mean LOS (GMLOS) (days): 3.6  Days to GMLOS:0.9     OBJECTIVE:  Risk of Unplanned Readmission Score: 35.28         Current admission status: Inpatient   Preferred Pharmacy:   [id number] [address] [address] [place] [address]  Phone: [phone] Fax: [phone]    [facility] - [address]  [address]  [place] [address] [address]  Phone: [phone] Fax: [phone]    Primary Care Provider: Rich Hassan MD    Primary Insurance: MEDICARE  Secondary Insurance:     DISCHARGE DETAILS:  As per SLIM, pt is being dc home today. Pt son transporting pt home. Samina from [facility] made aware. CM continues to follow.

## 2023-12-15 NOTE — ASSESSMENT & PLAN NOTE
/79   Pulse 90   Temp 98.6 °F (37 °C) (Oral)   Resp 20   Ht 5' 8" (1.727 m)   Wt 56.7 kg (125 lb)   SpO2 99%   BMI 19.01 kg/m²   Stable  Continue home anti-hypertensive medications.

## 2023-12-17 LAB
BACTERIA BLD CULT: NORMAL
BACTERIA BLD CULT: NORMAL

## 2024-01-01 ENCOUNTER — APPOINTMENT (INPATIENT)
Dept: RADIOLOGY | Facility: HOSPITAL | Age: 79
DRG: 177 | End: 2024-01-01
Payer: MEDICARE

## 2024-01-01 ENCOUNTER — HOSPITAL ENCOUNTER (INPATIENT)
Facility: HOSPITAL | Age: 79
LOS: 2 days | DRG: 177 | End: 2024-09-02
Attending: EMERGENCY MEDICINE | Admitting: INTERNAL MEDICINE
Payer: MEDICARE

## 2024-01-01 ENCOUNTER — APPOINTMENT (EMERGENCY)
Dept: RADIOLOGY | Facility: HOSPITAL | Age: 79
DRG: 177 | End: 2024-01-01
Payer: MEDICARE

## 2024-01-01 VITALS
RESPIRATION RATE: 26 BRPM | BODY MASS INDEX: 17.65 KG/M2 | TEMPERATURE: 98 F | OXYGEN SATURATION: 91 % | WEIGHT: 112.43 LBS | HEIGHT: 67 IN | DIASTOLIC BLOOD PRESSURE: 55 MMHG | SYSTOLIC BLOOD PRESSURE: 90 MMHG | HEART RATE: 127 BPM

## 2024-01-01 DIAGNOSIS — M62.81 GENERALIZED MUSCLE WEAKNESS: ICD-10-CM

## 2024-01-01 DIAGNOSIS — R07.9 CHEST PAIN: ICD-10-CM

## 2024-01-01 DIAGNOSIS — R26.2 AMBULATORY DYSFUNCTION: Primary | ICD-10-CM

## 2024-01-01 DIAGNOSIS — R06.02 SOB (SHORTNESS OF BREATH): ICD-10-CM

## 2024-01-01 DIAGNOSIS — Z60.9 SOCIAL PROBLEM: ICD-10-CM

## 2024-01-01 LAB
2HR DELTA HS TROPONIN: -1 NG/L
ALBUMIN SERPL BCG-MCNC: 3.7 G/DL (ref 3.5–5)
ALBUMIN SERPL BCG-MCNC: 3.9 G/DL (ref 3.5–5)
ALBUMIN SERPL BCG-MCNC: 4 G/DL (ref 3.5–5)
ALP SERPL-CCNC: 77 U/L (ref 34–104)
ALP SERPL-CCNC: 87 U/L (ref 34–104)
ALP SERPL-CCNC: 87 U/L (ref 34–104)
ALT SERPL W P-5'-P-CCNC: 3 U/L (ref 7–52)
ALT SERPL W P-5'-P-CCNC: 3 U/L (ref 7–52)
ALT SERPL W P-5'-P-CCNC: <3 U/L (ref 7–52)
ANION GAP SERPL CALCULATED.3IONS-SCNC: 10 MMOL/L (ref 4–13)
ANION GAP SERPL CALCULATED.3IONS-SCNC: 5 MMOL/L (ref 4–13)
ANION GAP SERPL CALCULATED.3IONS-SCNC: 6 MMOL/L (ref 4–13)
ANION GAP SERPL CALCULATED.3IONS-SCNC: 7 MMOL/L (ref 4–13)
ANISOCYTOSIS BLD QL SMEAR: PRESENT
AST SERPL W P-5'-P-CCNC: 13 U/L (ref 13–39)
AST SERPL W P-5'-P-CCNC: 13 U/L (ref 13–39)
AST SERPL W P-5'-P-CCNC: 8 U/L (ref 13–39)
ATRIAL RATE: 131 BPM
ATRIAL RATE: 80 BPM
BASE EXCESS BLDA CALC-SCNC: 3 MMOL/L (ref -2–3)
BASOPHILS # BLD AUTO: 0.03 THOUSANDS/ÂΜL (ref 0–0.1)
BASOPHILS # BLD AUTO: 0.04 THOUSANDS/ÂΜL (ref 0–0.1)
BASOPHILS # BLD MANUAL: 0 THOUSAND/UL (ref 0–0.1)
BASOPHILS NFR BLD AUTO: 1 % (ref 0–1)
BASOPHILS NFR BLD AUTO: 1 % (ref 0–1)
BASOPHILS NFR MAR MANUAL: 0 % (ref 0–1)
BILIRUB SERPL-MCNC: 0.33 MG/DL (ref 0.2–1)
BILIRUB SERPL-MCNC: 0.42 MG/DL (ref 0.2–1)
BILIRUB SERPL-MCNC: 0.78 MG/DL (ref 0.2–1)
BNP SERPL-MCNC: 65 PG/ML (ref 0–100)
BUN SERPL-MCNC: 23 MG/DL (ref 5–25)
BUN SERPL-MCNC: 24 MG/DL (ref 5–25)
BUN SERPL-MCNC: 24 MG/DL (ref 5–25)
BUN SERPL-MCNC: 42 MG/DL (ref 5–25)
CA-I BLD-SCNC: 1.18 MMOL/L (ref 1.12–1.32)
CALCIUM SERPL-MCNC: 9.3 MG/DL (ref 8.4–10.2)
CALCIUM SERPL-MCNC: 9.6 MG/DL (ref 8.4–10.2)
CALCIUM SERPL-MCNC: 9.7 MG/DL (ref 8.4–10.2)
CALCIUM SERPL-MCNC: 9.9 MG/DL (ref 8.4–10.2)
CARDIAC TROPONIN I PNL SERPL HS: 5 NG/L
CARDIAC TROPONIN I PNL SERPL HS: 6 NG/L
CHLORIDE SERPL-SCNC: 100 MMOL/L (ref 96–108)
CHLORIDE SERPL-SCNC: 101 MMOL/L (ref 96–108)
CHLORIDE SERPL-SCNC: 99 MMOL/L (ref 96–108)
CHLORIDE SERPL-SCNC: 99 MMOL/L (ref 96–108)
CO2 SERPL-SCNC: 31 MMOL/L (ref 21–32)
CO2 SERPL-SCNC: 32 MMOL/L (ref 21–32)
CO2 SERPL-SCNC: 35 MMOL/L (ref 21–32)
CO2 SERPL-SCNC: 36 MMOL/L (ref 21–32)
CREAT SERPL-MCNC: 1.26 MG/DL (ref 0.6–1.3)
CREAT SERPL-MCNC: 1.36 MG/DL (ref 0.6–1.3)
CREAT SERPL-MCNC: 1.45 MG/DL (ref 0.6–1.3)
CREAT SERPL-MCNC: 2.67 MG/DL (ref 0.6–1.3)
EOSINOPHIL # BLD AUTO: 0.18 THOUSAND/ÂΜL (ref 0–0.61)
EOSINOPHIL # BLD AUTO: 0.24 THOUSAND/ÂΜL (ref 0–0.61)
EOSINOPHIL # BLD MANUAL: 0 THOUSAND/UL (ref 0–0.4)
EOSINOPHIL NFR BLD AUTO: 3 % (ref 0–6)
EOSINOPHIL NFR BLD AUTO: 5 % (ref 0–6)
EOSINOPHIL NFR BLD MANUAL: 0 % (ref 0–6)
ERYTHROCYTE [DISTWIDTH] IN BLOOD BY AUTOMATED COUNT: 13.1 % (ref 11.6–15.1)
ERYTHROCYTE [DISTWIDTH] IN BLOOD BY AUTOMATED COUNT: 13.2 % (ref 11.6–15.1)
ERYTHROCYTE [DISTWIDTH] IN BLOOD BY AUTOMATED COUNT: 13.2 % (ref 11.6–15.1)
ERYTHROCYTE [DISTWIDTH] IN BLOOD BY AUTOMATED COUNT: 13.7 % (ref 11.6–15.1)
FLUAV RNA RESP QL NAA+PROBE: NEGATIVE
FLUBV RNA RESP QL NAA+PROBE: NEGATIVE
GFR SERPL CREATININE-BSD FRML MDRD: 16 ML/MIN/1.73SQ M
GFR SERPL CREATININE-BSD FRML MDRD: 34 ML/MIN/1.73SQ M
GFR SERPL CREATININE-BSD FRML MDRD: 37 ML/MIN/1.73SQ M
GFR SERPL CREATININE-BSD FRML MDRD: 41 ML/MIN/1.73SQ M
GLUCOSE P FAST SERPL-MCNC: 81 MG/DL (ref 65–99)
GLUCOSE SERPL-MCNC: 113 MG/DL (ref 65–140)
GLUCOSE SERPL-MCNC: 115 MG/DL (ref 65–140)
GLUCOSE SERPL-MCNC: 138 MG/DL (ref 65–140)
GLUCOSE SERPL-MCNC: 154 MG/DL (ref 65–140)
GLUCOSE SERPL-MCNC: 160 MG/DL (ref 65–140)
GLUCOSE SERPL-MCNC: 170 MG/DL (ref 65–140)
GLUCOSE SERPL-MCNC: 81 MG/DL (ref 65–140)
GLUCOSE SERPL-MCNC: 92 MG/DL (ref 65–140)
HCO3 BLDA-SCNC: 30.1 MMOL/L (ref 24–30)
HCT VFR BLD AUTO: 36.5 % (ref 34.8–46.1)
HCT VFR BLD AUTO: 36.5 % (ref 34.8–46.1)
HCT VFR BLD AUTO: 36.8 % (ref 34.8–46.1)
HCT VFR BLD AUTO: 38.2 % (ref 34.8–46.1)
HCT VFR BLD CALC: 38 % (ref 34.8–46.1)
HGB BLD-MCNC: 11.4 G/DL (ref 11.5–15.4)
HGB BLD-MCNC: 11.6 G/DL (ref 11.5–15.4)
HGB BLD-MCNC: 11.8 G/DL (ref 11.5–15.4)
HGB BLD-MCNC: 12 G/DL (ref 11.5–15.4)
HGB BLDA-MCNC: 12.9 G/DL (ref 11.5–15.4)
IMM GRANULOCYTES # BLD AUTO: 0 THOUSAND/UL (ref 0–0.2)
IMM GRANULOCYTES # BLD AUTO: 0.01 THOUSAND/UL (ref 0–0.2)
IMM GRANULOCYTES NFR BLD AUTO: 0 % (ref 0–2)
IMM GRANULOCYTES NFR BLD AUTO: 0 % (ref 0–2)
LACTATE SERPL-SCNC: 1.9 MMOL/L (ref 0.5–2)
LYMPHOCYTES # BLD AUTO: 12 % (ref 14–44)
LYMPHOCYTES # BLD AUTO: 2.06 THOUSAND/UL (ref 0.6–4.47)
LYMPHOCYTES # BLD AUTO: 2.23 THOUSANDS/ÂΜL (ref 0.6–4.47)
LYMPHOCYTES # BLD AUTO: 2.62 THOUSANDS/ÂΜL (ref 0.6–4.47)
LYMPHOCYTES NFR BLD AUTO: 43 % (ref 14–44)
LYMPHOCYTES NFR BLD AUTO: 47 % (ref 14–44)
MAGNESIUM SERPL-MCNC: 1.6 MG/DL (ref 1.9–2.7)
MAGNESIUM SERPL-MCNC: 1.6 MG/DL (ref 1.9–2.7)
MAGNESIUM SERPL-MCNC: 2 MG/DL (ref 1.9–2.7)
MCH RBC QN AUTO: 29.4 PG (ref 26.8–34.3)
MCH RBC QN AUTO: 30.1 PG (ref 26.8–34.3)
MCH RBC QN AUTO: 30.1 PG (ref 26.8–34.3)
MCH RBC QN AUTO: 30.4 PG (ref 26.8–34.3)
MCHC RBC AUTO-ENTMCNC: 30.9 G/DL (ref 31.4–37.4)
MCHC RBC AUTO-ENTMCNC: 31.2 G/DL (ref 31.4–37.4)
MCHC RBC AUTO-ENTMCNC: 31.5 G/DL (ref 31.4–37.4)
MCHC RBC AUTO-ENTMCNC: 32.9 G/DL (ref 31.4–37.4)
MCV RBC AUTO: 92 FL (ref 82–98)
MCV RBC AUTO: 95 FL (ref 82–98)
MCV RBC AUTO: 95 FL (ref 82–98)
MCV RBC AUTO: 97 FL (ref 82–98)
METAMYELOCYTE ABSOLUTE CT: 0.68 THOUSAND/UL (ref 0–0.1)
METAMYELOCYTES NFR BLD MANUAL: 5 % (ref 0–1)
MONOCYTES # BLD AUTO: 0.53 THOUSAND/ÂΜL (ref 0.17–1.22)
MONOCYTES # BLD AUTO: 0.55 THOUSAND/ÂΜL (ref 0.17–1.22)
MONOCYTES # BLD AUTO: 1.23 THOUSAND/UL (ref 0–1.22)
MONOCYTES NFR BLD AUTO: 10 % (ref 4–12)
MONOCYTES NFR BLD AUTO: 11 % (ref 4–12)
MONOCYTES NFR BLD: 9 % (ref 4–12)
MYELOCYTE ABSOLUTE CT: 0.14 THOUSAND/UL (ref 0–0.1)
MYELOCYTES NFR BLD MANUAL: 1 % (ref 0–1)
NEUTROPHILS # BLD AUTO: 2.02 THOUSANDS/ÂΜL (ref 1.85–7.62)
NEUTROPHILS # BLD AUTO: 2.15 THOUSANDS/ÂΜL (ref 1.85–7.62)
NEUTROPHILS # BLD MANUAL: 9.59 THOUSAND/UL (ref 1.85–7.62)
NEUTS BAND NFR BLD MANUAL: 19 % (ref 0–8)
NEUTS SEG NFR BLD AUTO: 39 % (ref 43–75)
NEUTS SEG NFR BLD AUTO: 40 % (ref 43–75)
NEUTS SEG NFR BLD AUTO: 51 % (ref 43–75)
NRBC BLD AUTO-RTO: 0 /100 WBCS
NRBC BLD AUTO-RTO: 0 /100 WBCS
P AXIS: 64 DEGREES
P AXIS: 65 DEGREES
PCO2 BLD: 32 MMOL/L (ref 21–32)
PCO2 BLD: 54.2 MM HG (ref 42–50)
PH BLD: 7.35 [PH] (ref 7.3–7.4)
PHOSPHATE SERPL-MCNC: 2.9 MG/DL (ref 2.3–4.1)
PLATELET # BLD AUTO: 169 THOUSANDS/UL (ref 149–390)
PLATELET # BLD AUTO: 181 THOUSANDS/UL (ref 149–390)
PLATELET # BLD AUTO: 183 THOUSANDS/UL (ref 149–390)
PLATELET # BLD AUTO: 193 THOUSANDS/UL (ref 149–390)
PLATELET BLD QL SMEAR: ADEQUATE
PMV BLD AUTO: 9.4 FL (ref 8.9–12.7)
PMV BLD AUTO: 9.5 FL (ref 8.9–12.7)
PMV BLD AUTO: 9.6 FL (ref 8.9–12.7)
PMV BLD AUTO: 9.9 FL (ref 8.9–12.7)
PO2 BLD: 33 MM HG (ref 35–45)
POTASSIUM BLD-SCNC: 4 MMOL/L (ref 3.5–5.3)
POTASSIUM SERPL-SCNC: 4.3 MMOL/L (ref 3.5–5.3)
POTASSIUM SERPL-SCNC: 4.4 MMOL/L (ref 3.5–5.3)
POTASSIUM SERPL-SCNC: 4.4 MMOL/L (ref 3.5–5.3)
POTASSIUM SERPL-SCNC: 4.5 MMOL/L (ref 3.5–5.3)
PR INTERVAL: 136 MS
PR INTERVAL: 158 MS
PROCALCITONIN SERPL-MCNC: 0.08 NG/ML
PROCALCITONIN SERPL-MCNC: 2.84 NG/ML
PROT SERPL-MCNC: 8 G/DL (ref 6.4–8.4)
PROT SERPL-MCNC: 8 G/DL (ref 6.4–8.4)
PROT SERPL-MCNC: 8.2 G/DL (ref 6.4–8.4)
QRS AXIS: 61 DEGREES
QRS AXIS: 73 DEGREES
QRSD INTERVAL: 84 MS
QRSD INTERVAL: 84 MS
QT INTERVAL: 296 MS
QT INTERVAL: 384 MS
QTC INTERVAL: 437 MS
QTC INTERVAL: 442 MS
RBC # BLD AUTO: 3.75 MILLION/UL (ref 3.81–5.12)
RBC # BLD AUTO: 3.86 MILLION/UL (ref 3.81–5.12)
RBC # BLD AUTO: 3.99 MILLION/UL (ref 3.81–5.12)
RBC # BLD AUTO: 4.01 MILLION/UL (ref 3.81–5.12)
RSV RNA RESP QL NAA+PROBE: NEGATIVE
SAO2 % BLD FROM PO2: 59 % (ref 60–85)
SARS-COV-2 RNA RESP QL NAA+PROBE: NEGATIVE
SODIUM BLD-SCNC: 141 MMOL/L (ref 136–145)
SODIUM SERPL-SCNC: 138 MMOL/L (ref 135–147)
SODIUM SERPL-SCNC: 140 MMOL/L (ref 135–147)
SODIUM SERPL-SCNC: 141 MMOL/L (ref 135–147)
SODIUM SERPL-SCNC: 142 MMOL/L (ref 135–147)
SPECIMEN SOURCE: ABNORMAL
T WAVE AXIS: 15 DEGREES
T WAVE AXIS: 67 DEGREES
VARIANT LYMPHS # BLD AUTO: 3 %
VENTRICULAR RATE: 131 BPM
VENTRICULAR RATE: 80 BPM
WBC # BLD AUTO: 11.37 THOUSAND/UL (ref 4.31–10.16)
WBC # BLD AUTO: 13.7 THOUSAND/UL (ref 4.31–10.16)
WBC # BLD AUTO: 5.06 THOUSAND/UL (ref 4.31–10.16)
WBC # BLD AUTO: 5.54 THOUSAND/UL (ref 4.31–10.16)

## 2024-01-01 PROCEDURE — 94640 AIRWAY INHALATION TREATMENT: CPT

## 2024-01-01 PROCEDURE — 94760 N-INVAS EAR/PLS OXIMETRY 1: CPT

## 2024-01-01 PROCEDURE — 83735 ASSAY OF MAGNESIUM: CPT | Performed by: INTERNAL MEDICINE

## 2024-01-01 PROCEDURE — 94668 MNPJ CHEST WALL SBSQ: CPT

## 2024-01-01 PROCEDURE — 97167 OT EVAL HIGH COMPLEX 60 MIN: CPT

## 2024-01-01 PROCEDURE — 85025 COMPLETE CBC W/AUTO DIFF WBC: CPT | Performed by: EMERGENCY MEDICINE

## 2024-01-01 PROCEDURE — 84132 ASSAY OF SERUM POTASSIUM: CPT

## 2024-01-01 PROCEDURE — 94664 DEMO&/EVAL PT USE INHALER: CPT

## 2024-01-01 PROCEDURE — 82330 ASSAY OF CALCIUM: CPT

## 2024-01-01 PROCEDURE — 94002 VENT MGMT INPAT INIT DAY: CPT

## 2024-01-01 PROCEDURE — NC001 PR NO CHARGE: Performed by: NURSE PRACTITIONER

## 2024-01-01 PROCEDURE — 36415 COLL VENOUS BLD VENIPUNCTURE: CPT | Performed by: EMERGENCY MEDICINE

## 2024-01-01 PROCEDURE — 99497 ADVNCD CARE PLAN 30 MIN: CPT | Performed by: NURSE PRACTITIONER

## 2024-01-01 PROCEDURE — 93005 ELECTROCARDIOGRAM TRACING: CPT

## 2024-01-01 PROCEDURE — 83880 ASSAY OF NATRIURETIC PEPTIDE: CPT | Performed by: EMERGENCY MEDICINE

## 2024-01-01 PROCEDURE — 87449 NOS EACH ORGANISM AG IA: CPT | Performed by: NURSE PRACTITIONER

## 2024-01-01 PROCEDURE — 84100 ASSAY OF PHOSPHORUS: CPT

## 2024-01-01 PROCEDURE — 82947 ASSAY GLUCOSE BLOOD QUANT: CPT

## 2024-01-01 PROCEDURE — 85025 COMPLETE CBC W/AUTO DIFF WBC: CPT

## 2024-01-01 PROCEDURE — 87040 BLOOD CULTURE FOR BACTERIA: CPT | Performed by: INTERNAL MEDICINE

## 2024-01-01 PROCEDURE — 80053 COMPREHEN METABOLIC PANEL: CPT | Performed by: EMERGENCY MEDICINE

## 2024-01-01 PROCEDURE — 97110 THERAPEUTIC EXERCISES: CPT

## 2024-01-01 PROCEDURE — 97163 PT EVAL HIGH COMPLEX 45 MIN: CPT

## 2024-01-01 PROCEDURE — 83605 ASSAY OF LACTIC ACID: CPT | Performed by: NURSE PRACTITIONER

## 2024-01-01 PROCEDURE — 99223 1ST HOSP IP/OBS HIGH 75: CPT | Performed by: STUDENT IN AN ORGANIZED HEALTH CARE EDUCATION/TRAINING PROGRAM

## 2024-01-01 PROCEDURE — 80053 COMPREHEN METABOLIC PANEL: CPT

## 2024-01-01 PROCEDURE — 99285 EMERGENCY DEPT VISIT HI MDM: CPT

## 2024-01-01 PROCEDURE — 84295 ASSAY OF SERUM SODIUM: CPT

## 2024-01-01 PROCEDURE — 85014 HEMATOCRIT: CPT

## 2024-01-01 PROCEDURE — 85027 COMPLETE CBC AUTOMATED: CPT | Performed by: INTERNAL MEDICINE

## 2024-01-01 PROCEDURE — 93010 ELECTROCARDIOGRAM REPORT: CPT | Performed by: STUDENT IN AN ORGANIZED HEALTH CARE EDUCATION/TRAINING PROGRAM

## 2024-01-01 PROCEDURE — 71045 X-RAY EXAM CHEST 1 VIEW: CPT

## 2024-01-01 PROCEDURE — 82948 REAGENT STRIP/BLOOD GLUCOSE: CPT

## 2024-01-01 PROCEDURE — 82803 BLOOD GASES ANY COMBINATION: CPT

## 2024-01-01 PROCEDURE — 99233 SBSQ HOSP IP/OBS HIGH 50: CPT | Performed by: INTERNAL MEDICINE

## 2024-01-01 PROCEDURE — 83735 ASSAY OF MAGNESIUM: CPT

## 2024-01-01 PROCEDURE — 36600 WITHDRAWAL OF ARTERIAL BLOOD: CPT

## 2024-01-01 PROCEDURE — NC001 PR NO CHARGE: Performed by: STUDENT IN AN ORGANIZED HEALTH CARE EDUCATION/TRAINING PROGRAM

## 2024-01-01 PROCEDURE — 84145 PROCALCITONIN (PCT): CPT | Performed by: INTERNAL MEDICINE

## 2024-01-01 PROCEDURE — 99222 1ST HOSP IP/OBS MODERATE 55: CPT

## 2024-01-01 PROCEDURE — 84484 ASSAY OF TROPONIN QUANT: CPT | Performed by: EMERGENCY MEDICINE

## 2024-01-01 PROCEDURE — 0241U HB NFCT DS VIR RESP RNA 4 TRGT: CPT | Performed by: NURSE PRACTITIONER

## 2024-01-01 PROCEDURE — 85007 BL SMEAR W/DIFF WBC COUNT: CPT | Performed by: INTERNAL MEDICINE

## 2024-01-01 PROCEDURE — 80053 COMPREHEN METABOLIC PANEL: CPT | Performed by: INTERNAL MEDICINE

## 2024-01-01 PROCEDURE — 80048 BASIC METABOLIC PNL TOTAL CA: CPT | Performed by: INTERNAL MEDICINE

## 2024-01-01 PROCEDURE — 99285 EMERGENCY DEPT VISIT HI MDM: CPT | Performed by: EMERGENCY MEDICINE

## 2024-01-01 RX ORDER — LORAZEPAM 2 MG/ML
1 INJECTION INTRAMUSCULAR
Status: DISCONTINUED | OUTPATIENT
Start: 2024-01-01 | End: 2024-09-03 | Stop reason: HOSPADM

## 2024-01-01 RX ORDER — HALOPERIDOL 5 MG/ML
2 INJECTION INTRAMUSCULAR EVERY 2 HOUR PRN
Status: DISCONTINUED | OUTPATIENT
Start: 2024-01-01 | End: 2024-09-03 | Stop reason: HOSPADM

## 2024-01-01 RX ORDER — ALBUMIN, HUMAN INJ 5% 5 %
12.5 SOLUTION INTRAVENOUS ONCE
Status: COMPLETED | OUTPATIENT
Start: 2024-01-01 | End: 2024-01-01

## 2024-01-01 RX ORDER — HEPARIN SODIUM 5000 [USP'U]/ML
5000 INJECTION, SOLUTION INTRAVENOUS; SUBCUTANEOUS EVERY 12 HOURS SCHEDULED
Status: DISCONTINUED | OUTPATIENT
Start: 2024-01-01 | End: 2024-01-01

## 2024-01-01 RX ORDER — VILAZODONE HYDROCHLORIDE 20 MG/1
40 TABLET ORAL
Status: DISCONTINUED | OUTPATIENT
Start: 2024-01-01 | End: 2024-01-01

## 2024-01-01 RX ORDER — ACETAMINOPHEN 325 MG/1
650 TABLET ORAL EVERY 6 HOURS PRN
Status: DISCONTINUED | OUTPATIENT
Start: 2024-01-01 | End: 2024-01-01

## 2024-01-01 RX ORDER — GLYCOPYRROLATE 0.2 MG/ML
0.1 INJECTION INTRAMUSCULAR; INTRAVENOUS ONCE
Status: COMPLETED | OUTPATIENT
Start: 2024-01-01 | End: 2024-01-01

## 2024-01-01 RX ORDER — SODIUM CHLORIDE, SODIUM GLUCONATE, SODIUM ACETATE, POTASSIUM CHLORIDE, MAGNESIUM CHLORIDE, SODIUM PHOSPHATE, DIBASIC, AND POTASSIUM PHOSPHATE .53; .5; .37; .037; .03; .012; .00082 G/100ML; G/100ML; G/100ML; G/100ML; G/100ML; G/100ML; G/100ML
250 INJECTION, SOLUTION INTRAVENOUS ONCE
Status: COMPLETED | OUTPATIENT
Start: 2024-01-01 | End: 2024-01-01

## 2024-01-01 RX ORDER — GABAPENTIN 100 MG/1
100 CAPSULE ORAL
Status: DISCONTINUED | OUTPATIENT
Start: 2024-01-01 | End: 2024-01-01

## 2024-01-01 RX ORDER — FUROSEMIDE 10 MG/ML
20 INJECTION INTRAMUSCULAR; INTRAVENOUS ONCE
Status: DISCONTINUED | OUTPATIENT
Start: 2024-01-01 | End: 2024-01-01

## 2024-01-01 RX ORDER — ONDANSETRON 2 MG/ML
4 INJECTION INTRAMUSCULAR; INTRAVENOUS EVERY 6 HOURS PRN
Status: DISCONTINUED | OUTPATIENT
Start: 2024-01-01 | End: 2024-09-03 | Stop reason: HOSPADM

## 2024-01-01 RX ORDER — LEVALBUTEROL INHALATION SOLUTION 1.25 MG/3ML
1.25 SOLUTION RESPIRATORY (INHALATION)
Status: DISCONTINUED | OUTPATIENT
Start: 2024-01-01 | End: 2024-09-03 | Stop reason: HOSPADM

## 2024-01-01 RX ORDER — PANTOPRAZOLE SODIUM 40 MG/1
40 TABLET, DELAYED RELEASE ORAL DAILY
Status: DISCONTINUED | OUTPATIENT
Start: 2024-01-01 | End: 2024-01-01

## 2024-01-01 RX ORDER — METOPROLOL SUCCINATE 100 MG/1
100 TABLET, EXTENDED RELEASE ORAL DAILY
Status: DISCONTINUED | OUTPATIENT
Start: 2024-01-01 | End: 2024-01-01

## 2024-01-01 RX ORDER — SCOLOPAMINE TRANSDERMAL SYSTEM 1 MG/1
1 PATCH, EXTENDED RELEASE TRANSDERMAL
Status: DISCONTINUED | OUTPATIENT
Start: 2024-01-01 | End: 2024-09-03 | Stop reason: HOSPADM

## 2024-01-01 RX ORDER — POLYETHYLENE GLYCOL 3350 17 G/17G
17 POWDER, FOR SOLUTION ORAL DAILY PRN
Status: DISCONTINUED | OUTPATIENT
Start: 2024-01-01 | End: 2024-01-01

## 2024-01-01 RX ORDER — HEPARIN SODIUM 5000 [USP'U]/ML
5000 INJECTION, SOLUTION INTRAVENOUS; SUBCUTANEOUS EVERY 8 HOURS SCHEDULED
Status: DISCONTINUED | OUTPATIENT
Start: 2024-01-01 | End: 2024-01-01

## 2024-01-01 RX ORDER — GUAIFENESIN 600 MG/1
1200 TABLET, EXTENDED RELEASE ORAL EVERY 12 HOURS SCHEDULED
Status: DISCONTINUED | OUTPATIENT
Start: 2024-01-01 | End: 2024-01-01

## 2024-01-01 RX ORDER — ENOXAPARIN SODIUM 100 MG/ML
40 INJECTION SUBCUTANEOUS DAILY
Status: DISCONTINUED | OUTPATIENT
Start: 2024-01-01 | End: 2024-01-01

## 2024-01-01 RX ORDER — PRAVASTATIN SODIUM 80 MG/1
80 TABLET ORAL EVERY EVENING
Status: DISCONTINUED | OUTPATIENT
Start: 2024-01-01 | End: 2024-01-01

## 2024-01-01 RX ORDER — METRONIDAZOLE 500 MG/100ML
500 INJECTION, SOLUTION INTRAVENOUS EVERY 8 HOURS
Status: DISCONTINUED | OUTPATIENT
Start: 2024-01-01 | End: 2024-01-01

## 2024-01-01 RX ORDER — GLYCOPYRROLATE 0.2 MG/ML
0.1 INJECTION INTRAMUSCULAR; INTRAVENOUS EVERY 4 HOURS PRN
Status: DISCONTINUED | OUTPATIENT
Start: 2024-01-01 | End: 2024-09-03 | Stop reason: HOSPADM

## 2024-01-01 RX ORDER — HYDROXYZINE HYDROCHLORIDE 25 MG/1
25 TABLET, FILM COATED ORAL EVERY 6 HOURS PRN
Status: DISCONTINUED | OUTPATIENT
Start: 2024-01-01 | End: 2024-01-01

## 2024-01-01 RX ORDER — MAGNESIUM SULFATE HEPTAHYDRATE 40 MG/ML
2 INJECTION, SOLUTION INTRAVENOUS ONCE
Status: COMPLETED | OUTPATIENT
Start: 2024-01-01 | End: 2024-01-01

## 2024-01-01 RX ORDER — LANOLIN ALCOHOL/MO/W.PET/CERES
3 CREAM (GRAM) TOPICAL
Status: DISCONTINUED | OUTPATIENT
Start: 2024-01-01 | End: 2024-01-01

## 2024-01-01 RX ORDER — MAGNESIUM HYDROXIDE/ALUMINUM HYDROXICE/SIMETHICONE 120; 1200; 1200 MG/30ML; MG/30ML; MG/30ML
30 SUSPENSION ORAL EVERY 6 HOURS PRN
Status: DISCONTINUED | OUTPATIENT
Start: 2024-01-01 | End: 2024-01-01

## 2024-01-01 RX ORDER — HYDRALAZINE HYDROCHLORIDE 20 MG/ML
10 INJECTION INTRAMUSCULAR; INTRAVENOUS ONCE
Status: DISCONTINUED | OUTPATIENT
Start: 2024-01-01 | End: 2024-01-01

## 2024-01-01 RX ORDER — LEVALBUTEROL INHALATION SOLUTION 1.25 MG/3ML
1.25 SOLUTION RESPIRATORY (INHALATION)
Status: DISCONTINUED | OUTPATIENT
Start: 2024-01-01 | End: 2024-01-01

## 2024-01-01 RX ORDER — BUPROPION HYDROCHLORIDE 75 MG/1
75 TABLET ORAL DAILY
Status: DISCONTINUED | OUTPATIENT
Start: 2024-01-01 | End: 2024-01-01

## 2024-01-01 RX ADMIN — METOPROLOL SUCCINATE 100 MG: 100 TABLET, EXTENDED RELEASE ORAL at 08:23

## 2024-01-01 RX ADMIN — HEPARIN SODIUM 5000 UNITS: 5000 INJECTION INTRAVENOUS; SUBCUTANEOUS at 13:18

## 2024-01-01 RX ADMIN — Medication 3 MG: at 21:10

## 2024-01-01 RX ADMIN — LEVALBUTEROL HYDROCHLORIDE 1.25 MG: 1.25 SOLUTION RESPIRATORY (INHALATION) at 07:04

## 2024-01-01 RX ADMIN — LEVALBUTEROL HYDROCHLORIDE 1.25 MG: 1.25 SOLUTION RESPIRATORY (INHALATION) at 19:23

## 2024-01-01 RX ADMIN — PRAVASTATIN SODIUM 80 MG: 80 TABLET ORAL at 17:03

## 2024-01-01 RX ADMIN — PANTOPRAZOLE SODIUM 40 MG: 40 TABLET, DELAYED RELEASE ORAL at 08:23

## 2024-01-01 RX ADMIN — GABAPENTIN 100 MG: 100 CAPSULE ORAL at 21:10

## 2024-01-01 RX ADMIN — SACUBITRIL AND VALSARTAN 1 TABLET: 24; 26 TABLET, FILM COATED ORAL at 08:22

## 2024-01-01 RX ADMIN — GLYCOPYRROLATE 0.1 MG: 0.2 INJECTION, SOLUTION INTRAMUSCULAR; INTRAVENOUS at 21:01

## 2024-01-01 RX ADMIN — BUPROPION HYDROCHLORIDE 75 MG: 75 TABLET, FILM COATED ORAL at 08:20

## 2024-01-01 RX ADMIN — IPRATROPIUM BROMIDE 0.5 MG: 0.5 SOLUTION RESPIRATORY (INHALATION) at 13:11

## 2024-01-01 RX ADMIN — METRONIDAZOLE 500 MG: 500 INJECTION, SOLUTION INTRAVENOUS at 09:55

## 2024-01-01 RX ADMIN — ASPIRIN 81 MG: 81 TABLET, COATED ORAL at 08:23

## 2024-01-01 RX ADMIN — LEVALBUTEROL HYDROCHLORIDE 1.25 MG: 1.25 SOLUTION RESPIRATORY (INHALATION) at 07:07

## 2024-01-01 RX ADMIN — HEPARIN SODIUM 5000 UNITS: 5000 INJECTION INTRAVENOUS; SUBCUTANEOUS at 14:31

## 2024-01-01 RX ADMIN — VILAZODONE HYDROCHLORIDE 40 MG: 20 TABLET ORAL at 08:15

## 2024-01-01 RX ADMIN — MORPHINE SULFATE 2 MG: 2 INJECTION, SOLUTION INTRAMUSCULAR; INTRAVENOUS at 21:21

## 2024-01-01 RX ADMIN — MORPHINE SULFATE 2 MG: 2 INJECTION, SOLUTION INTRAMUSCULAR; INTRAVENOUS at 20:57

## 2024-01-01 RX ADMIN — VILAZODONE HYDROCHLORIDE 40 MG: 20 TABLET ORAL at 08:22

## 2024-01-01 RX ADMIN — MAGNESIUM SULFATE HEPTAHYDRATE 2 G: 40 INJECTION, SOLUTION INTRAVENOUS at 10:56

## 2024-01-01 RX ADMIN — HYDROXYZINE HYDROCHLORIDE 25 MG: 25 TABLET ORAL at 21:10

## 2024-01-01 RX ADMIN — GUAIFENESIN 1200 MG: 600 TABLET ORAL at 10:40

## 2024-01-01 RX ADMIN — IPRATROPIUM BROMIDE 0.5 MG: 0.5 SOLUTION RESPIRATORY (INHALATION) at 19:07

## 2024-01-01 RX ADMIN — HEPARIN SODIUM 5000 UNITS: 5000 INJECTION INTRAVENOUS; SUBCUTANEOUS at 21:10

## 2024-01-01 RX ADMIN — LEVALBUTEROL HYDROCHLORIDE 1.25 MG: 1.25 SOLUTION RESPIRATORY (INHALATION) at 19:07

## 2024-01-01 RX ADMIN — BUPROPION HYDROCHLORIDE 75 MG: 75 TABLET, FILM COATED ORAL at 08:24

## 2024-01-01 RX ADMIN — HYDROXYZINE HYDROCHLORIDE 25 MG: 25 TABLET ORAL at 22:28

## 2024-01-01 RX ADMIN — ALBUMIN (HUMAN) 12.5 G: 12.5 INJECTION, SOLUTION INTRAVENOUS at 18:01

## 2024-01-01 RX ADMIN — LORAZEPAM 1 MG: 2 INJECTION INTRAMUSCULAR; INTRAVENOUS at 21:26

## 2024-01-01 RX ADMIN — SACUBITRIL AND VALSARTAN 1 TABLET: 24; 26 TABLET, FILM COATED ORAL at 17:06

## 2024-01-01 RX ADMIN — GABAPENTIN 100 MG: 100 CAPSULE ORAL at 22:28

## 2024-01-01 RX ADMIN — PANTOPRAZOLE SODIUM 40 MG: 40 TABLET, DELAYED RELEASE ORAL at 08:14

## 2024-01-01 RX ADMIN — HEPARIN SODIUM 5000 UNITS: 5000 INJECTION INTRAVENOUS; SUBCUTANEOUS at 22:28

## 2024-01-01 RX ADMIN — Medication 3 MG: at 22:28

## 2024-01-01 RX ADMIN — UMECLIDINIUM BROMIDE AND VILANTEROL TRIFENATATE 1 PUFF: 62.5; 25 POWDER RESPIRATORY (INHALATION) at 08:02

## 2024-01-01 RX ADMIN — HEPARIN SODIUM 5000 UNITS: 5000 INJECTION INTRAVENOUS; SUBCUTANEOUS at 06:06

## 2024-01-01 RX ADMIN — UMECLIDINIUM BROMIDE AND VILANTEROL TRIFENATATE 1 PUFF: 62.5; 25 POWDER RESPIRATORY (INHALATION) at 08:20

## 2024-01-01 RX ADMIN — SACUBITRIL AND VALSARTAN 1 TABLET: 24; 26 TABLET, FILM COATED ORAL at 08:20

## 2024-01-01 RX ADMIN — Medication 3 MG: at 03:05

## 2024-01-01 RX ADMIN — ASPIRIN 81 MG: 81 TABLET, COATED ORAL at 08:14

## 2024-01-01 RX ADMIN — GLYCOPYRROLATE 0.1 MG: 0.2 INJECTION, SOLUTION INTRAMUSCULAR; INTRAVENOUS at 21:10

## 2024-01-01 RX ADMIN — ALBUMIN (HUMAN) 12.5 G: 12.5 INJECTION, SOLUTION INTRAVENOUS at 19:40

## 2024-01-01 RX ADMIN — LEVALBUTEROL HYDROCHLORIDE 1.25 MG: 1.25 SOLUTION RESPIRATORY (INHALATION) at 13:11

## 2024-01-01 RX ADMIN — SODIUM CHLORIDE, SODIUM GLUCONATE, SODIUM ACETATE, POTASSIUM CHLORIDE, MAGNESIUM CHLORIDE, SODIUM PHOSPHATE, DIBASIC, AND POTASSIUM PHOSPHATE 250 ML: .53; .5; .37; .037; .03; .012; .00082 INJECTION, SOLUTION INTRAVENOUS at 11:43

## 2024-01-01 RX ADMIN — SCOPALAMINE 1 PATCH: 1 PATCH, EXTENDED RELEASE TRANSDERMAL at 21:22

## 2024-01-01 RX ADMIN — PIPERACILLIN AND TAZOBACTAM 4.5 G: 36; 4.5 INJECTION, POWDER, FOR SOLUTION INTRAVENOUS at 16:23

## 2024-01-01 RX ADMIN — METOPROLOL SUCCINATE 100 MG: 100 TABLET, EXTENDED RELEASE ORAL at 08:14

## 2024-01-01 RX ADMIN — HEPARIN SODIUM 5000 UNITS: 5000 INJECTION INTRAVENOUS; SUBCUTANEOUS at 07:02

## 2024-01-01 RX ADMIN — LEVALBUTEROL HYDROCHLORIDE 1.25 MG: 1.25 SOLUTION RESPIRATORY (INHALATION) at 20:01

## 2024-01-01 RX ADMIN — GUAIFENESIN 1200 MG: 600 TABLET ORAL at 21:10

## 2024-01-01 RX ADMIN — HEPARIN SODIUM 5000 UNITS: 5000 INJECTION INTRAVENOUS; SUBCUTANEOUS at 12:55

## 2024-01-01 RX ADMIN — LEVALBUTEROL HYDROCHLORIDE 1.25 MG: 1.25 SOLUTION RESPIRATORY (INHALATION) at 13:23

## 2024-01-01 RX ADMIN — CEFTRIAXONE SODIUM 2000 MG: 10 INJECTION, POWDER, FOR SOLUTION INTRAVENOUS at 09:02

## 2024-01-01 RX ADMIN — ACETAMINOPHEN 650 MG: 325 TABLET, FILM COATED ORAL at 08:20

## 2024-01-01 SDOH — SOCIAL STABILITY - SOCIAL INSECURITY: PROBLEM RELATED TO SOCIAL ENVIRONMENT, UNSPECIFIED: Z60.9

## 2024-02-15 ENCOUNTER — APPOINTMENT (EMERGENCY)
Dept: CT IMAGING | Facility: HOSPITAL | Age: 79
DRG: 871 | End: 2024-02-15
Payer: MEDICARE

## 2024-02-15 ENCOUNTER — APPOINTMENT (EMERGENCY)
Dept: RADIOLOGY | Facility: HOSPITAL | Age: 79
DRG: 871 | End: 2024-02-15
Payer: MEDICARE

## 2024-02-15 ENCOUNTER — HOSPITAL ENCOUNTER (INPATIENT)
Facility: HOSPITAL | Age: 79
LOS: 21 days | Discharge: NURSING, REHAB OR LONG-TERM CARE | DRG: 871 | End: 2024-03-07
Attending: STUDENT IN AN ORGANIZED HEALTH CARE EDUCATION/TRAINING PROGRAM | Admitting: INTERNAL MEDICINE
Payer: MEDICARE

## 2024-02-15 DIAGNOSIS — U07.1 COVID-19: ICD-10-CM

## 2024-02-15 DIAGNOSIS — I21.4 NSTEMI (NON-ST ELEVATED MYOCARDIAL INFARCTION) (HCC): ICD-10-CM

## 2024-02-15 DIAGNOSIS — R42 DIZZINESS: ICD-10-CM

## 2024-02-15 DIAGNOSIS — R07.9 CHEST PAIN: ICD-10-CM

## 2024-02-15 DIAGNOSIS — R06.00 DYSPNEA: Primary | ICD-10-CM

## 2024-02-15 DIAGNOSIS — J44.9 COPD (CHRONIC OBSTRUCTIVE PULMONARY DISEASE) (HCC): ICD-10-CM

## 2024-02-15 DIAGNOSIS — R94.31 ABNORMAL EKG: ICD-10-CM

## 2024-02-15 DIAGNOSIS — R79.89 ELEVATED TROPONIN: ICD-10-CM

## 2024-02-15 LAB
2HR DELTA HS TROPONIN: 1607 NG/L
4HR DELTA HS TROPONIN: 1698 NG/L
ABO GROUP BLD: NORMAL
ALBUMIN SERPL BCP-MCNC: 3.9 G/DL (ref 3.5–5)
ALP SERPL-CCNC: 70 U/L (ref 34–104)
ALT SERPL W P-5'-P-CCNC: 3 U/L (ref 7–52)
ANION GAP SERPL CALCULATED.3IONS-SCNC: 10 MMOL/L
APTT PPP: 30 SECONDS (ref 23–37)
AST SERPL W P-5'-P-CCNC: 17 U/L (ref 13–39)
BASOPHILS # BLD AUTO: 0.02 THOUSANDS/ÂΜL (ref 0–0.1)
BASOPHILS NFR BLD AUTO: 0 % (ref 0–1)
BILIRUB SERPL-MCNC: 0.35 MG/DL (ref 0.2–1)
BNP SERPL-MCNC: 135 PG/ML (ref 0–100)
BUN SERPL-MCNC: 14 MG/DL (ref 5–25)
CALCIUM SERPL-MCNC: 8.7 MG/DL (ref 8.4–10.2)
CARDIAC TROPONIN I PNL SERPL HS: 1440 NG/L
CARDIAC TROPONIN I PNL SERPL HS: 3047 NG/L
CARDIAC TROPONIN I PNL SERPL HS: 3138 NG/L
CHLORIDE SERPL-SCNC: 107 MMOL/L (ref 96–108)
CO2 SERPL-SCNC: 25 MMOL/L (ref 21–32)
CREAT SERPL-MCNC: 0.9 MG/DL (ref 0.6–1.3)
CRP SERPL QL: 2 MG/L
EOSINOPHIL # BLD AUTO: 0.2 THOUSAND/ÂΜL (ref 0–0.61)
EOSINOPHIL NFR BLD AUTO: 3 % (ref 0–6)
ERYTHROCYTE [DISTWIDTH] IN BLOOD BY AUTOMATED COUNT: 11.9 % (ref 11.6–15.1)
FLUAV RNA RESP QL NAA+PROBE: NEGATIVE
FLUBV RNA RESP QL NAA+PROBE: NEGATIVE
GFR SERPL CREATININE-BSD FRML MDRD: 61 ML/MIN/1.73SQ M
GLUCOSE SERPL-MCNC: 167 MG/DL (ref 65–140)
HCT VFR BLD AUTO: 40.1 % (ref 34.8–46.1)
HGB BLD-MCNC: 12.8 G/DL (ref 11.5–15.4)
IMM GRANULOCYTES # BLD AUTO: 0.01 THOUSAND/UL (ref 0–0.2)
IMM GRANULOCYTES NFR BLD AUTO: 0 % (ref 0–2)
INR PPP: 1.03 (ref 0.84–1.19)
LACTATE SERPL-SCNC: 0.8 MMOL/L (ref 0.5–2)
LYMPHOCYTES # BLD AUTO: 1.07 THOUSANDS/ÂΜL (ref 0.6–4.47)
LYMPHOCYTES NFR BLD AUTO: 18 % (ref 14–44)
MCH RBC QN AUTO: 30.8 PG (ref 26.8–34.3)
MCHC RBC AUTO-ENTMCNC: 31.9 G/DL (ref 31.4–37.4)
MCV RBC AUTO: 97 FL (ref 82–98)
MONOCYTES # BLD AUTO: 0.3 THOUSAND/ÂΜL (ref 0.17–1.22)
MONOCYTES NFR BLD AUTO: 5 % (ref 4–12)
NEUTROPHILS # BLD AUTO: 4.44 THOUSANDS/ÂΜL (ref 1.85–7.62)
NEUTS SEG NFR BLD AUTO: 74 % (ref 43–75)
NRBC BLD AUTO-RTO: 0 /100 WBCS
PLATELET # BLD AUTO: 182 THOUSANDS/UL (ref 149–390)
PMV BLD AUTO: 10.3 FL (ref 8.9–12.7)
POTASSIUM SERPL-SCNC: 4 MMOL/L (ref 3.5–5.3)
PROCALCITONIN SERPL-MCNC: <0.05 NG/ML
PROT SERPL-MCNC: 6.3 G/DL (ref 6.4–8.4)
PROTHROMBIN TIME: 14.1 SECONDS (ref 11.6–14.5)
RBC # BLD AUTO: 4.15 MILLION/UL (ref 3.81–5.12)
RH BLD: NEGATIVE
RSV RNA RESP QL NAA+PROBE: NEGATIVE
SARS-COV-2 RNA RESP QL NAA+PROBE: POSITIVE
SODIUM SERPL-SCNC: 142 MMOL/L (ref 135–147)
WBC # BLD AUTO: 6.04 THOUSAND/UL (ref 4.31–10.16)

## 2024-02-15 PROCEDURE — 94640 AIRWAY INHALATION TREATMENT: CPT

## 2024-02-15 PROCEDURE — 87154 CUL TYP ID BLD PTHGN 6+ TRGT: CPT | Performed by: INTERNAL MEDICINE

## 2024-02-15 PROCEDURE — 99223 1ST HOSP IP/OBS HIGH 75: CPT | Performed by: INTERNAL MEDICINE

## 2024-02-15 PROCEDURE — 80053 COMPREHEN METABOLIC PANEL: CPT | Performed by: STUDENT IN AN ORGANIZED HEALTH CARE EDUCATION/TRAINING PROGRAM

## 2024-02-15 PROCEDURE — 84484 ASSAY OF TROPONIN QUANT: CPT | Performed by: STUDENT IN AN ORGANIZED HEALTH CARE EDUCATION/TRAINING PROGRAM

## 2024-02-15 PROCEDURE — 84145 PROCALCITONIN (PCT): CPT | Performed by: INTERNAL MEDICINE

## 2024-02-15 PROCEDURE — 83605 ASSAY OF LACTIC ACID: CPT | Performed by: INTERNAL MEDICINE

## 2024-02-15 PROCEDURE — 83036 HEMOGLOBIN GLYCOSYLATED A1C: CPT | Performed by: INTERNAL MEDICINE

## 2024-02-15 PROCEDURE — 87040 BLOOD CULTURE FOR BACTERIA: CPT | Performed by: INTERNAL MEDICINE

## 2024-02-15 PROCEDURE — 85610 PROTHROMBIN TIME: CPT | Performed by: STUDENT IN AN ORGANIZED HEALTH CARE EDUCATION/TRAINING PROGRAM

## 2024-02-15 PROCEDURE — 85025 COMPLETE CBC W/AUTO DIFF WBC: CPT | Performed by: STUDENT IN AN ORGANIZED HEALTH CARE EDUCATION/TRAINING PROGRAM

## 2024-02-15 PROCEDURE — 93005 ELECTROCARDIOGRAM TRACING: CPT

## 2024-02-15 PROCEDURE — G1004 CDSM NDSC: HCPCS

## 2024-02-15 PROCEDURE — 87077 CULTURE AEROBIC IDENTIFY: CPT | Performed by: INTERNAL MEDICINE

## 2024-02-15 PROCEDURE — 96361 HYDRATE IV INFUSION ADD-ON: CPT

## 2024-02-15 PROCEDURE — 86900 BLOOD TYPING SEROLOGIC ABO: CPT | Performed by: INTERNAL MEDICINE

## 2024-02-15 PROCEDURE — 85730 THROMBOPLASTIN TIME PARTIAL: CPT | Performed by: STUDENT IN AN ORGANIZED HEALTH CARE EDUCATION/TRAINING PROGRAM

## 2024-02-15 PROCEDURE — 86901 BLOOD TYPING SEROLOGIC RH(D): CPT | Performed by: INTERNAL MEDICINE

## 2024-02-15 PROCEDURE — 71045 X-RAY EXAM CHEST 1 VIEW: CPT

## 2024-02-15 PROCEDURE — 86140 C-REACTIVE PROTEIN: CPT | Performed by: INTERNAL MEDICINE

## 2024-02-15 PROCEDURE — 36415 COLL VENOUS BLD VENIPUNCTURE: CPT | Performed by: STUDENT IN AN ORGANIZED HEALTH CARE EDUCATION/TRAINING PROGRAM

## 2024-02-15 PROCEDURE — 83880 ASSAY OF NATRIURETIC PEPTIDE: CPT | Performed by: STUDENT IN AN ORGANIZED HEALTH CARE EDUCATION/TRAINING PROGRAM

## 2024-02-15 PROCEDURE — 99285 EMERGENCY DEPT VISIT HI MDM: CPT | Performed by: STUDENT IN AN ORGANIZED HEALTH CARE EDUCATION/TRAINING PROGRAM

## 2024-02-15 PROCEDURE — 0241U HB NFCT DS VIR RESP RNA 4 TRGT: CPT | Performed by: STUDENT IN AN ORGANIZED HEALTH CARE EDUCATION/TRAINING PROGRAM

## 2024-02-15 PROCEDURE — 99285 EMERGENCY DEPT VISIT HI MDM: CPT

## 2024-02-15 PROCEDURE — 96365 THER/PROPH/DIAG IV INF INIT: CPT

## 2024-02-15 PROCEDURE — XW033E5 INTRODUCTION OF REMDESIVIR ANTI-INFECTIVE INTO PERIPHERAL VEIN, PERCUTANEOUS APPROACH, NEW TECHNOLOGY GROUP 5: ICD-10-PCS | Performed by: INTERNAL MEDICINE

## 2024-02-15 PROCEDURE — 96375 TX/PRO/DX INJ NEW DRUG ADDON: CPT

## 2024-02-15 PROCEDURE — 71260 CT THORAX DX C+: CPT

## 2024-02-15 PROCEDURE — 84484 ASSAY OF TROPONIN QUANT: CPT | Performed by: INTERNAL MEDICINE

## 2024-02-15 PROCEDURE — 94760 N-INVAS EAR/PLS OXIMETRY 1: CPT

## 2024-02-15 RX ORDER — CARVEDILOL 12.5 MG/1
25 TABLET ORAL 2 TIMES DAILY WITH MEALS
Status: DISCONTINUED | OUTPATIENT
Start: 2024-02-16 | End: 2024-03-07 | Stop reason: HOSPADM

## 2024-02-15 RX ORDER — MAGNESIUM HYDROXIDE/ALUMINUM HYDROXICE/SIMETHICONE 120; 1200; 1200 MG/30ML; MG/30ML; MG/30ML
30 SUSPENSION ORAL EVERY 6 HOURS PRN
Status: DISCONTINUED | OUTPATIENT
Start: 2024-02-15 | End: 2024-03-07 | Stop reason: HOSPADM

## 2024-02-15 RX ORDER — VILAZODONE HYDROCHLORIDE 20 MG/1
40 TABLET ORAL
Status: DISCONTINUED | OUTPATIENT
Start: 2024-02-16 | End: 2024-03-07 | Stop reason: HOSPADM

## 2024-02-15 RX ORDER — HYDROCHLOROTHIAZIDE 12.5 MG/1
25 TABLET ORAL DAILY
Status: DISCONTINUED | OUTPATIENT
Start: 2024-02-16 | End: 2024-02-16

## 2024-02-15 RX ORDER — PANTOPRAZOLE SODIUM 40 MG/1
40 TABLET, DELAYED RELEASE ORAL DAILY
Status: DISCONTINUED | OUTPATIENT
Start: 2024-02-16 | End: 2024-03-07 | Stop reason: HOSPADM

## 2024-02-15 RX ORDER — ALBUTEROL SULFATE 90 UG/1
2 AEROSOL, METERED RESPIRATORY (INHALATION) EVERY 4 HOURS PRN
Status: DISCONTINUED | OUTPATIENT
Start: 2024-02-15 | End: 2024-03-07 | Stop reason: HOSPADM

## 2024-02-15 RX ORDER — LABETALOL HYDROCHLORIDE 5 MG/ML
10 INJECTION, SOLUTION INTRAVENOUS EVERY 6 HOURS PRN
Status: DISCONTINUED | OUTPATIENT
Start: 2024-02-15 | End: 2024-03-07 | Stop reason: HOSPADM

## 2024-02-15 RX ORDER — NITROGLYCERIN 0.4 MG/1
0.4 TABLET SUBLINGUAL
Status: DISCONTINUED | OUTPATIENT
Start: 2024-02-15 | End: 2024-03-07 | Stop reason: HOSPADM

## 2024-02-15 RX ORDER — ASPIRIN 325 MG
325 TABLET ORAL ONCE
Status: COMPLETED | OUTPATIENT
Start: 2024-02-15 | End: 2024-02-15

## 2024-02-15 RX ORDER — LEVALBUTEROL INHALATION SOLUTION 1.25 MG/3ML
SOLUTION RESPIRATORY (INHALATION)
Status: COMPLETED
Start: 2024-02-15 | End: 2024-02-15

## 2024-02-15 RX ORDER — PRAVASTATIN SODIUM 80 MG/1
80 TABLET ORAL EVERY EVENING
Status: DISCONTINUED | OUTPATIENT
Start: 2024-02-15 | End: 2024-03-07 | Stop reason: HOSPADM

## 2024-02-15 RX ORDER — BUDESONIDE AND FORMOTEROL FUMARATE DIHYDRATE 160; 4.5 UG/1; UG/1
2 AEROSOL RESPIRATORY (INHALATION) 2 TIMES DAILY
Status: DISCONTINUED | OUTPATIENT
Start: 2024-02-15 | End: 2024-03-07 | Stop reason: HOSPADM

## 2024-02-15 RX ORDER — IPRATROPIUM BROMIDE AND ALBUTEROL SULFATE 2.5; .5 MG/3ML; MG/3ML
3 SOLUTION RESPIRATORY (INHALATION) ONCE
Status: COMPLETED | OUTPATIENT
Start: 2024-02-15 | End: 2024-02-15

## 2024-02-15 RX ORDER — HEPARIN SODIUM 1000 [USP'U]/ML
3300 INJECTION, SOLUTION INTRAVENOUS; SUBCUTANEOUS ONCE
Status: COMPLETED | OUTPATIENT
Start: 2024-02-15 | End: 2024-02-15

## 2024-02-15 RX ORDER — IPRATROPIUM BROMIDE AND ALBUTEROL SULFATE .5; 3 MG/3ML; MG/3ML
1 SOLUTION RESPIRATORY (INHALATION) ONCE
Status: COMPLETED | OUTPATIENT
Start: 2024-02-15 | End: 2024-02-15

## 2024-02-15 RX ORDER — ALBUTEROL SULFATE 90 UG/1
2 AEROSOL, METERED RESPIRATORY (INHALATION) EVERY 6 HOURS PRN
Status: DISCONTINUED | OUTPATIENT
Start: 2024-02-15 | End: 2024-02-15

## 2024-02-15 RX ORDER — DOCUSATE SODIUM 100 MG/1
100 CAPSULE, LIQUID FILLED ORAL 2 TIMES DAILY
Status: DISCONTINUED | OUTPATIENT
Start: 2024-02-15 | End: 2024-03-07 | Stop reason: HOSPADM

## 2024-02-15 RX ORDER — ONDANSETRON 2 MG/ML
4 INJECTION INTRAMUSCULAR; INTRAVENOUS EVERY 6 HOURS PRN
Status: DISCONTINUED | OUTPATIENT
Start: 2024-02-15 | End: 2024-03-07 | Stop reason: HOSPADM

## 2024-02-15 RX ORDER — DEXAMETHASONE SODIUM PHOSPHATE 10 MG/ML
6 INJECTION, SOLUTION INTRAMUSCULAR; INTRAVENOUS EVERY 24 HOURS
Status: DISCONTINUED | OUTPATIENT
Start: 2024-02-15 | End: 2024-02-22

## 2024-02-15 RX ORDER — SODIUM CHLORIDE, SODIUM GLUCONATE, SODIUM ACETATE, POTASSIUM CHLORIDE, MAGNESIUM CHLORIDE, SODIUM PHOSPHATE, DIBASIC, AND POTASSIUM PHOSPHATE .53; .5; .37; .037; .03; .012; .00082 G/100ML; G/100ML; G/100ML; G/100ML; G/100ML; G/100ML; G/100ML
500 INJECTION, SOLUTION INTRAVENOUS ONCE
Status: COMPLETED | OUTPATIENT
Start: 2024-02-15 | End: 2024-02-15

## 2024-02-15 RX ORDER — BUPROPION HYDROCHLORIDE 75 MG/1
75 TABLET ORAL DAILY
Status: DISCONTINUED | OUTPATIENT
Start: 2024-02-15 | End: 2024-03-07 | Stop reason: HOSPADM

## 2024-02-15 RX ORDER — HEPARIN SODIUM 1000 [USP'U]/ML
1650 INJECTION, SOLUTION INTRAVENOUS; SUBCUTANEOUS EVERY 6 HOURS PRN
Status: DISCONTINUED | OUTPATIENT
Start: 2024-02-15 | End: 2024-02-22

## 2024-02-15 RX ORDER — LEVALBUTEROL INHALATION SOLUTION 1.25 MG/3ML
1.25 SOLUTION RESPIRATORY (INHALATION)
Status: DISCONTINUED | OUTPATIENT
Start: 2024-02-15 | End: 2024-02-28

## 2024-02-15 RX ORDER — HEPARIN SODIUM 1000 [USP'U]/ML
3300 INJECTION, SOLUTION INTRAVENOUS; SUBCUTANEOUS EVERY 6 HOURS PRN
Status: DISCONTINUED | OUTPATIENT
Start: 2024-02-15 | End: 2024-02-22

## 2024-02-15 RX ORDER — HEPARIN SODIUM 10000 [USP'U]/100ML
3-20 INJECTION, SOLUTION INTRAVENOUS
Status: DISCONTINUED | OUTPATIENT
Start: 2024-02-15 | End: 2024-02-22

## 2024-02-15 RX ORDER — LISINOPRIL 5 MG/1
5 TABLET ORAL DAILY
Status: DISCONTINUED | OUTPATIENT
Start: 2024-02-16 | End: 2024-02-16

## 2024-02-15 RX ADMIN — IPRATROPIUM BROMIDE 0.5 MG: 0.5 SOLUTION RESPIRATORY (INHALATION) at 22:34

## 2024-02-15 RX ADMIN — SODIUM CHLORIDE 500 ML: 0.9 INJECTION, SOLUTION INTRAVENOUS at 17:48

## 2024-02-15 RX ADMIN — BUDESONIDE AND FORMOTEROL FUMARATE DIHYDRATE 2 PUFF: 160; 4.5 AEROSOL RESPIRATORY (INHALATION) at 22:09

## 2024-02-15 RX ADMIN — IPRATROPIUM BROMIDE AND ALBUTEROL SULFATE 3 ML: 2.5; .5 SOLUTION RESPIRATORY (INHALATION) at 17:39

## 2024-02-15 RX ADMIN — LEVALBUTEROL HYDROCHLORIDE 1.25 MG: 1.25 SOLUTION RESPIRATORY (INHALATION) at 22:35

## 2024-02-15 RX ADMIN — DOCUSATE SODIUM 100 MG: 100 CAPSULE, LIQUID FILLED ORAL at 22:09

## 2024-02-15 RX ADMIN — PRAVASTATIN SODIUM 80 MG: 80 TABLET ORAL at 22:09

## 2024-02-15 RX ADMIN — IPRATROPIUM BROMIDE AND ALBUTEROL SULFATE 3 ML: 2.5; .5 SOLUTION RESPIRATORY (INHALATION) at 19:40

## 2024-02-15 RX ADMIN — HEPARIN SODIUM 3300 UNITS: 1000 INJECTION INTRAVENOUS; SUBCUTANEOUS at 19:33

## 2024-02-15 RX ADMIN — BUPROPION HYDROCHLORIDE 75 MG: 75 TABLET, FILM COATED ORAL at 22:08

## 2024-02-15 RX ADMIN — SODIUM CHLORIDE, SODIUM GLUCONATE, SODIUM ACETATE, POTASSIUM CHLORIDE, MAGNESIUM CHLORIDE, SODIUM PHOSPHATE, DIBASIC, AND POTASSIUM PHOSPHATE 500 ML: .53; .5; .37; .037; .03; .012; .00082 INJECTION, SOLUTION INTRAVENOUS at 20:36

## 2024-02-15 RX ADMIN — IOHEXOL 85 ML: 350 INJECTION, SOLUTION INTRAVENOUS at 18:47

## 2024-02-15 RX ADMIN — HEPARIN SODIUM 12 UNITS/KG/HR: 10000 INJECTION, SOLUTION INTRAVENOUS at 19:33

## 2024-02-15 RX ADMIN — REMDESIVIR 200 MG: 100 INJECTION, POWDER, LYOPHILIZED, FOR SOLUTION INTRAVENOUS at 21:54

## 2024-02-15 RX ADMIN — DEXAMETHASONE SODIUM PHOSPHATE 6 MG: 10 INJECTION, SOLUTION INTRAMUSCULAR; INTRAVENOUS at 22:09

## 2024-02-15 RX ADMIN — ASPIRIN 325 MG ORAL TABLET 325 MG: 325 PILL ORAL at 20:30

## 2024-02-16 LAB
2HR DELTA HS TROPONIN: 747 NG/L
4HR DELTA HS TROPONIN: 877 NG/L
ALBUMIN SERPL BCP-MCNC: 4.1 G/DL (ref 3.5–5)
ALP SERPL-CCNC: 79 U/L (ref 34–104)
ALT SERPL W P-5'-P-CCNC: 5 U/L (ref 7–52)
ANION GAP SERPL CALCULATED.3IONS-SCNC: 14 MMOL/L
APTT PPP: 152 SECONDS (ref 23–37)
APTT PPP: 75 SECONDS (ref 23–37)
APTT PPP: 75 SECONDS (ref 23–37)
AST SERPL W P-5'-P-CCNC: 26 U/L (ref 13–39)
BASOPHILS # BLD AUTO: 0.01 THOUSANDS/ÂΜL (ref 0–0.1)
BASOPHILS NFR BLD AUTO: 0 % (ref 0–1)
BILIRUB SERPL-MCNC: 0.39 MG/DL (ref 0.2–1)
BUN SERPL-MCNC: 15 MG/DL (ref 5–25)
CALCIUM SERPL-MCNC: 9.2 MG/DL (ref 8.4–10.2)
CARDIAC TROPONIN I PNL SERPL HS: 3874 NG/L
CARDIAC TROPONIN I PNL SERPL HS: 4621 NG/L
CARDIAC TROPONIN I PNL SERPL HS: 4751 NG/L
CHLORIDE SERPL-SCNC: 102 MMOL/L (ref 96–108)
CHOLEST SERPL-MCNC: 223 MG/DL
CO2 SERPL-SCNC: 26 MMOL/L (ref 21–32)
CREAT SERPL-MCNC: 0.97 MG/DL (ref 0.6–1.3)
CRP SERPL QL: 3.8 MG/L
EOSINOPHIL # BLD AUTO: 0.01 THOUSAND/ÂΜL (ref 0–0.61)
EOSINOPHIL NFR BLD AUTO: 0 % (ref 0–6)
ERYTHROCYTE [DISTWIDTH] IN BLOOD BY AUTOMATED COUNT: 11.9 % (ref 11.6–15.1)
EST. AVERAGE GLUCOSE BLD GHB EST-MCNC: 105 MG/DL
GFR SERPL CREATININE-BSD FRML MDRD: 56 ML/MIN/1.73SQ M
GLUCOSE SERPL-MCNC: 202 MG/DL (ref 65–140)
HBA1C MFR BLD: 5.3 %
HCT VFR BLD AUTO: 42.1 % (ref 34.8–46.1)
HDLC SERPL-MCNC: 55 MG/DL
HGB BLD-MCNC: 13.4 G/DL (ref 11.5–15.4)
IMM GRANULOCYTES # BLD AUTO: 0.03 THOUSAND/UL (ref 0–0.2)
IMM GRANULOCYTES NFR BLD AUTO: 0 % (ref 0–2)
LDLC SERPL CALC-MCNC: 139 MG/DL (ref 0–100)
LYMPHOCYTES # BLD AUTO: 4.45 THOUSANDS/ÂΜL (ref 0.6–4.47)
LYMPHOCYTES NFR BLD AUTO: 48 % (ref 14–44)
MAGNESIUM SERPL-MCNC: 1.7 MG/DL (ref 1.9–2.7)
MCH RBC QN AUTO: 30.7 PG (ref 26.8–34.3)
MCHC RBC AUTO-ENTMCNC: 31.8 G/DL (ref 31.4–37.4)
MCV RBC AUTO: 96 FL (ref 82–98)
MONOCYTES # BLD AUTO: 0.28 THOUSAND/ÂΜL (ref 0.17–1.22)
MONOCYTES NFR BLD AUTO: 3 % (ref 4–12)
NEUTROPHILS # BLD AUTO: 4.42 THOUSANDS/ÂΜL (ref 1.85–7.62)
NEUTS SEG NFR BLD AUTO: 49 % (ref 43–75)
NRBC BLD AUTO-RTO: 0 /100 WBCS
PLATELET # BLD AUTO: 302 THOUSANDS/UL (ref 149–390)
PMV BLD AUTO: 10.3 FL (ref 8.9–12.7)
POTASSIUM SERPL-SCNC: 4.2 MMOL/L (ref 3.5–5.3)
PROCALCITONIN SERPL-MCNC: 0.11 NG/ML
PROT SERPL-MCNC: 6.7 G/DL (ref 6.4–8.4)
RBC # BLD AUTO: 4.37 MILLION/UL (ref 3.81–5.12)
SODIUM SERPL-SCNC: 142 MMOL/L (ref 135–147)
TRIGL SERPL-MCNC: 143 MG/DL
WBC # BLD AUTO: 9.2 THOUSAND/UL (ref 4.31–10.16)

## 2024-02-16 PROCEDURE — 86140 C-REACTIVE PROTEIN: CPT | Performed by: INTERNAL MEDICINE

## 2024-02-16 PROCEDURE — 94640 AIRWAY INHALATION TREATMENT: CPT

## 2024-02-16 PROCEDURE — 93005 ELECTROCARDIOGRAM TRACING: CPT

## 2024-02-16 PROCEDURE — 84145 PROCALCITONIN (PCT): CPT | Performed by: INTERNAL MEDICINE

## 2024-02-16 PROCEDURE — 82728 ASSAY OF FERRITIN: CPT | Performed by: INTERNAL MEDICINE

## 2024-02-16 PROCEDURE — 85730 THROMBOPLASTIN TIME PARTIAL: CPT | Performed by: INTERNAL MEDICINE

## 2024-02-16 PROCEDURE — 99223 1ST HOSP IP/OBS HIGH 75: CPT | Performed by: INTERNAL MEDICINE

## 2024-02-16 PROCEDURE — 94760 N-INVAS EAR/PLS OXIMETRY 1: CPT

## 2024-02-16 PROCEDURE — 80061 LIPID PANEL: CPT | Performed by: INTERNAL MEDICINE

## 2024-02-16 PROCEDURE — 36415 COLL VENOUS BLD VENIPUNCTURE: CPT | Performed by: INTERNAL MEDICINE

## 2024-02-16 PROCEDURE — XW0DXM6 INTRODUCTION OF BARICITINIB INTO MOUTH AND PHARYNX, EXTERNAL APPROACH, NEW TECHNOLOGY GROUP 6: ICD-10-PCS | Performed by: INTERNAL MEDICINE

## 2024-02-16 PROCEDURE — 85652 RBC SED RATE AUTOMATED: CPT | Performed by: INTERNAL MEDICINE

## 2024-02-16 PROCEDURE — 85025 COMPLETE CBC W/AUTO DIFF WBC: CPT | Performed by: INTERNAL MEDICINE

## 2024-02-16 PROCEDURE — 84484 ASSAY OF TROPONIN QUANT: CPT

## 2024-02-16 PROCEDURE — 99233 SBSQ HOSP IP/OBS HIGH 50: CPT | Performed by: INTERNAL MEDICINE

## 2024-02-16 PROCEDURE — 80053 COMPREHEN METABOLIC PANEL: CPT | Performed by: INTERNAL MEDICINE

## 2024-02-16 PROCEDURE — 83735 ASSAY OF MAGNESIUM: CPT | Performed by: INTERNAL MEDICINE

## 2024-02-16 RX ORDER — MIDODRINE HYDROCHLORIDE 5 MG/1
10 TABLET ORAL
Status: DISCONTINUED | OUTPATIENT
Start: 2024-02-16 | End: 2024-02-19

## 2024-02-16 RX ORDER — FUROSEMIDE 10 MG/ML
40 INJECTION INTRAMUSCULAR; INTRAVENOUS ONCE
Status: COMPLETED | OUTPATIENT
Start: 2024-02-16 | End: 2024-02-16

## 2024-02-16 RX ORDER — ALBUTEROL SULFATE 2.5 MG/3ML
2.5 SOLUTION RESPIRATORY (INHALATION) EVERY 4 HOURS PRN
Status: DISCONTINUED | OUTPATIENT
Start: 2024-02-16 | End: 2024-03-07 | Stop reason: HOSPADM

## 2024-02-16 RX ORDER — LIDOCAINE 50 MG/G
2 PATCH TOPICAL DAILY PRN
Status: DISCONTINUED | OUTPATIENT
Start: 2024-02-16 | End: 2024-03-07 | Stop reason: HOSPADM

## 2024-02-16 RX ORDER — METHYLPREDNISOLONE SODIUM SUCCINATE 40 MG/ML
40 INJECTION, POWDER, LYOPHILIZED, FOR SOLUTION INTRAMUSCULAR; INTRAVENOUS ONCE
Status: COMPLETED | OUTPATIENT
Start: 2024-02-16 | End: 2024-02-16

## 2024-02-16 RX ORDER — METOPROLOL TARTRATE 1 MG/ML
5 INJECTION, SOLUTION INTRAVENOUS ONCE
Status: COMPLETED | OUTPATIENT
Start: 2024-02-16 | End: 2024-02-16

## 2024-02-16 RX ORDER — MAGNESIUM SULFATE HEPTAHYDRATE 40 MG/ML
2 INJECTION, SOLUTION INTRAVENOUS ONCE
Status: COMPLETED | OUTPATIENT
Start: 2024-02-16 | End: 2024-02-16

## 2024-02-16 RX ORDER — ALBUMIN (HUMAN) 12.5 G/50ML
50 SOLUTION INTRAVENOUS ONCE
Status: COMPLETED | OUTPATIENT
Start: 2024-02-16 | End: 2024-02-16

## 2024-02-16 RX ORDER — IPRATROPIUM BROMIDE AND ALBUTEROL SULFATE 2.5; .5 MG/3ML; MG/3ML
3 SOLUTION RESPIRATORY (INHALATION) ONCE
Status: DISCONTINUED | OUTPATIENT
Start: 2024-02-16 | End: 2024-02-16

## 2024-02-16 RX ORDER — GUAIFENESIN/DEXTROMETHORPHAN 100-10MG/5
10 SYRUP ORAL EVERY 6 HOURS PRN
Status: DISCONTINUED | OUTPATIENT
Start: 2024-02-16 | End: 2024-02-24

## 2024-02-16 RX ORDER — ACETAMINOPHEN 325 MG/1
975 TABLET ORAL EVERY 8 HOURS PRN
Status: DISCONTINUED | OUTPATIENT
Start: 2024-02-16 | End: 2024-02-18

## 2024-02-16 RX ADMIN — ALBUMIN (HUMAN) 50 G: 0.25 INJECTION, SOLUTION INTRAVENOUS at 08:59

## 2024-02-16 RX ADMIN — MIDODRINE HYDROCHLORIDE 10 MG: 5 TABLET ORAL at 08:58

## 2024-02-16 RX ADMIN — METHYLPREDNISOLONE SODIUM SUCCINATE 40 MG: 40 INJECTION, POWDER, FOR SOLUTION INTRAMUSCULAR; INTRAVENOUS at 05:03

## 2024-02-16 RX ADMIN — IPRATROPIUM BROMIDE 0.5 MG: 0.5 SOLUTION RESPIRATORY (INHALATION) at 19:49

## 2024-02-16 RX ADMIN — REMDESIVIR 100 MG: 100 INJECTION, POWDER, LYOPHILIZED, FOR SOLUTION INTRAVENOUS at 22:29

## 2024-02-16 RX ADMIN — BUDESONIDE AND FORMOTEROL FUMARATE DIHYDRATE 2 PUFF: 160; 4.5 AEROSOL RESPIRATORY (INHALATION) at 18:10

## 2024-02-16 RX ADMIN — BARICITINIB 2 MG: 2 TABLET, FILM COATED ORAL at 09:22

## 2024-02-16 RX ADMIN — LEVALBUTEROL HYDROCHLORIDE 1.25 MG: 1.25 SOLUTION RESPIRATORY (INHALATION) at 19:49

## 2024-02-16 RX ADMIN — ALBUTEROL SULFATE 2 PUFF: 90 AEROSOL, METERED RESPIRATORY (INHALATION) at 04:09

## 2024-02-16 RX ADMIN — FUROSEMIDE 40 MG: 10 INJECTION, SOLUTION INTRAMUSCULAR; INTRAVENOUS at 06:16

## 2024-02-16 RX ADMIN — BUPROPION HYDROCHLORIDE 75 MG: 75 TABLET, FILM COATED ORAL at 08:39

## 2024-02-16 RX ADMIN — DEXAMETHASONE SODIUM PHOSPHATE 6 MG: 10 INJECTION, SOLUTION INTRAMUSCULAR; INTRAVENOUS at 20:48

## 2024-02-16 RX ADMIN — CARVEDILOL 25 MG: 12.5 TABLET, FILM COATED ORAL at 16:05

## 2024-02-16 RX ADMIN — LEVALBUTEROL HYDROCHLORIDE 1.25 MG: 1.25 SOLUTION RESPIRATORY (INHALATION) at 07:33

## 2024-02-16 RX ADMIN — ALBUTEROL SULFATE 2.5 MG: 2.5 SOLUTION RESPIRATORY (INHALATION) at 05:02

## 2024-02-16 RX ADMIN — MAGNESIUM SULFATE HEPTAHYDRATE 2 G: 40 INJECTION, SOLUTION INTRAVENOUS at 07:26

## 2024-02-16 RX ADMIN — PANTOPRAZOLE SODIUM 40 MG: 40 TABLET, DELAYED RELEASE ORAL at 08:40

## 2024-02-16 RX ADMIN — PRAVASTATIN SODIUM 80 MG: 80 TABLET ORAL at 17:21

## 2024-02-16 RX ADMIN — IPRATROPIUM BROMIDE 0.5 MG: 0.5 SOLUTION RESPIRATORY (INHALATION) at 07:33

## 2024-02-16 RX ADMIN — METOPROLOL TARTRATE 5 MG: 5 INJECTION INTRAVENOUS at 06:25

## 2024-02-16 RX ADMIN — DOCUSATE SODIUM 100 MG: 100 CAPSULE, LIQUID FILLED ORAL at 17:21

## 2024-02-16 RX ADMIN — VILAZODONE HYDROCHLORIDE 40 MG: 20 TABLET ORAL at 09:22

## 2024-02-16 RX ADMIN — BUDESONIDE AND FORMOTEROL FUMARATE DIHYDRATE 2 PUFF: 160; 4.5 AEROSOL RESPIRATORY (INHALATION) at 08:41

## 2024-02-16 RX ADMIN — MIDODRINE HYDROCHLORIDE 10 MG: 5 TABLET ORAL at 16:04

## 2024-02-16 RX ADMIN — ASPIRIN 81 MG: 81 TABLET, COATED ORAL at 08:40

## 2024-02-16 RX ADMIN — DOCUSATE SODIUM 100 MG: 100 CAPSULE, LIQUID FILLED ORAL at 08:40

## 2024-02-16 NOTE — CONSULTS
Consultation - Cardiology   Lauren Quintero 78 y.o. female MRN: [id number]  Unit/Bed#: FT 04 Encounter: [id number]  02/16/24  8:54 AM    Assessment/ Plan:  1.  Sepsis, COVID-19 infection  2.  Acute on chronic hypoxic respiratory failure - baseline 4L NC    3.  Chest pain  Troponins elevated as per below.  EKG shows sinus tachycardia without acute ischemic abnormalities.  TTE ordered.  Continue heparin gtt.  Would be reasonable to consider further ischemic evaluation when clinically optimized.    4.  Elevated troponin  Troponins uptrending to 3188, continue to trend.  Possibly secondary to sepsis/COVID/acute respiratory failure, however cannot rule out ischemic etiology.    5.  CAD s/p DANIELLA to mRCA (9/2018)  Continue ASA, pravastatin, and Coreg.    6.  Chronic HFpEF  Clinically appears euvolemic on exam, hold HCTZ given soft BP.  , mild pulmonary edema on CT chest.  Strict I/O's and daily weights; recommend sodium restriction.    7.  Hypertension  BP has been labile.  Midodrine 10 mg tid initiated by primary service. Hold lisinopril and HCTZ. Continue Coreg.    8.  Hyperlipidemia  Continue pravastatin    9.  CKD 2-3  10.  COPD  11.  IFG        History of Present Illness   Physician Requesting Consult: Jose Shelton DO    Reason for Consult / Principal Problem: Chest pain, elevated troponin    HPI: Lauren Quintero is a 78 y.o. year old female with history of CAD s/p PCI, chronic HFpEF, hypertension, hyperlipidemia, CKD, COPD with chronic hypoxic respiratory failure, and IFG who presents with shortness of breath and increased oxygen demand.  Shortness of breath has been progressively worsening over the past week.  Patient endorses associated cough.  She was found to be COVID-positive in the ED.  EKG without acute ischemic abnormalities, however troponins noted to be uptrending.  Cardiology consulted for further evaluation and management.  Previously followed with cardiology at [facility], however has not followed up  recently.      Inpatient consult to Cardiology  Consult performed by: Mio Montgomery PA-C  Consult ordered by: Jose Shelton DO          EKG: Sinus tachycardia with PACs; rightward axis; anterior infarct, age undetermined      Review of Systems   Constitutional:  Negative for chills and diaphoresis.   HENT:  Negative for ear pain and sore throat.    Eyes:  Negative for pain and visual disturbance.   Respiratory:  Positive for cough and shortness of breath.    Cardiovascular:  Negative for chest pain, palpitations and leg swelling.   Gastrointestinal:  Negative for abdominal pain and vomiting.   Genitourinary:  Negative for dysuria and hematuria.   Musculoskeletal:  Negative for arthralgias and back pain.   Skin:  Negative for color change and rash.   Neurological:  Negative for seizures and syncope.   All other systems reviewed and are negative.      Historical Information   Past Medical History:   Diagnosis Date    Acute congestive heart failure (HCC) 2021    Anxiety     Cardiac disease     Chest pain 2021    Chronic pain disorder     COPD (chronic obstructive pulmonary disease) ([facility])     Depression     Heart disease     Hyperlipidemia     Hypertension     MI (myocardial infarction) ([facility])     MRSA (methicillin resistant Staphylococcus aureus)     Renal disorder     benign kidney tumor     Past Surgical History:   Procedure Laterality Date    APPENDECTOMY      ELBOW BURSA SURGERY Left 2018    D/T MRSA INFECTION    SPINAL FUSION      L7 L9     Social History     Substance and Sexual Activity   Alcohol Use Never     Social History     Substance and Sexual Activity   Drug Use No     Social History     Tobacco Use   Smoking Status Former    Current packs/day: 0.00    Average packs/day: 1 pack/day for 60.0 years (60.0 ttl pk-yrs)    Types: Cigarettes    Start date:     Quit date: 2016    Years since quittin.1   Smokeless Tobacco Never   Tobacco Comments    She has close to a 60 pack-year  smoking history, most recently has cut down to 4-5 cigarettes daily       Family History:   Family History   Problem Relation Age of Onset    Heart disease Mother     Cancer Father        Meds/Allergies   all current active meds have been reviewed  Allergies   Allergen Reactions    Sulfa Antibiotics Anaphylaxis    Iron GI Intolerance    Niacin     Statins Other (See Comments)     cramps       Objective   Vitals: Blood pressure (!) 82/56, pulse 103, temperature 98.5 °F (36.9 °C), temperature source Oral, resp. rate 21, weight 60.3 kg (132 lb 15 oz), SpO2 95%, not currently breastfeeding., Body mass index is 20.21 kg/m².,   Orthostatic Blood Pressures      Flowsheet Row Most Recent Value   Blood Pressure 82/56 filed at 2024 0800   Patient Position - Orthostatic VS Lying filed at 2024 0800            Systolic (24hrs), Av , Min:82 , Max:179     Diastolic (24hrs), Av, Min:54, Max:129        Intake/Output Summary (Last 24 hours) at 2024 0854  Last data filed at 2024 0834  Gross per 24 hour   Intake 1050 ml   Output --   Net 1050 ml       Invasive Devices       Peripheral Intravenous Line  Duration             Peripheral IV 02/15/24 Dorsal (posterior);Left Hand <1 day    Peripheral IV 02/15/24 Right Antecubital <1 day                        Physical Exam:  GEN: Alert and oriented x 3, in no acute distress.  Ill appearing and well nourished.  NC in place.  HEENT: Sclera anicteric, conjunctivae pink, mucous membranes moist. Oropharynx clear.   NECK: Supple, no carotid bruits, no significant JVD. Trachea midline, no thyromegaly.   HEART: Regular rhythm, normal S1 and S2, no murmurs, clicks, gallops or rubs. PMI nondisplaced, no thrills.   LUNGS: Clear to auscultation bilaterally; no wheezes, rales, or rhonchi. No increased work of breathing or signs of respiratory distress.   ABDOMEN: Soft, nontender, nondistended, normoactive bowel sounds.   EXTREMITIES: Skin warm and well perfused, no  clubbing, cyanosis, or edema.  NEURO: No focal findings. Normal speech. Mood and affect normal.   SKIN: Normal without suspicious lesions on exposed skin.    Lab Results:     Cardiac Profile:   Results from last 7 days   Lab Units 02/15/24  2154 02/15/24  1956 02/15/24  1756   HS TNI 0HR ng/L  --   --  1,440*   HS TNI 2HR ng/L  --  3,047*  --    HSTNI D2 ng/L  --  1,607*  --    HS TNI 4HR ng/L 3,138*  --   --    HSTNI D4 ng/L 1,698*  --   --        CBC with diff:   Results from last 7 days   Lab Units 02/16/24  0521 02/15/24  1756   WBC Thousand/uL 9.20 6.04   HEMOGLOBIN g/dL 13.4 12.8   HEMATOCRIT % 42.1 40.1   MCV fL 96 97   PLATELETS Thousands/uL 302 182   RBC Million/uL 4.37 4.15   MCH pg 30.7 30.8   MCHC g/dL 31.8 31.9   RDW % 11.9 11.9   MPV fL 10.3 10.3   NRBC AUTO /100 WBCs 0 0         CMP:   Results from last 7 days   Lab Units 02/16/24  0521 02/15/24  1756   POTASSIUM mmol/L 4.2 4.0   CHLORIDE mmol/L 102 107   CO2 mmol/L 26 25   BUN mg/dL 15 14   CREATININE mg/dL 0.97 0.90   CALCIUM mg/dL 9.2 8.7   AST U/L 26 17   ALT U/L 5* 3*   ALK PHOS U/L 79 70   EGFR ml/min/1.73sq m 56 61

## 2024-02-16 NOTE — RESPIRATORY THERAPY NOTE
RT Protocol Note  Lauren Quintero 78 y.o. female MRN: [id number]  Unit/Bed#: FT 04 Encounter: [id number]    Assessment    Principal Problem:    NSTEMI (non-ST elevated myocardial infarction) (Formerly [facility])  Active Problems:    Essential hypertension    Acute on chronic respiratory failure with hypoxia (Former[facility])    Sepsis (Formerly [facility])    Major depressive disorder, recurrent episode, moderate ([facility]ly [facility])    COPD without exacerbation (Formerly [facility])    COVID-19      Home Pulmonary Medications:  nebulizers  Home Devices/Therapy: Home O2 (4 lpm all the time)    Past Medical History:   Diagnosis Date    Acute congestive heart failure (HCC) 2021    Anxiety     Cardiac disease     Chest pain 2021    Chronic pain disorder     COPD (chronic obstructive pulmonary disease) ([facility])     Depression     Heart disease     Hyperlipidemia     Hypertension     MI (myocardial infarction) ([facility])     MRSA (methicillin resistant Staphylococcus aureus)     Renal disorder     benign kidney tumor     Social History     Socioeconomic History    Marital status:      Spouse name: None    Number of children: 3    Years of education: 13    Highest education level: High school graduate   Occupational History    Occupation:      Employer: [employer]     Comment: retired    Tobacco Use    Smoking status: Former     Current packs/day: 0.00     Average packs/day: 1 pack/day for 60.0 years (60.0 ttl pk-yrs)     Types: Cigarettes     Start date:      Quit date: 2016     Years since quittin.1    Smokeless tobacco: Never    Tobacco comments:     She has close to a 60 pack-year smoking history, most recently has cut down to 4-5 cigarettes daily   Vaping Use    Vaping status: Never Used   Substance and Sexual Activity    Alcohol use: Never    Drug use: No    Sexual activity: Not Currently   Other Topics Concern    None   Social History Narrative    None     Social Determinants of Health     Financial Resource Strain: Patient Declined  (1/6/2024)    Received from [facility]    Overall Financial Resource Strain (CARDIA)     Difficulty of Paying Living Expenses: Patient declined   Food Insecurity: Patient Declined (1/6/2024)    Received from [facility]    Hunger Vital Sign     Worried About Running Out of Food in the Last Year: Patient declined     Ran Out of Food in the Last Year: Patient declined   Transportation Needs: Patient Declined (1/6/2024)    Received from [facility]    PRAPARE - Transportation     Lack of Transportation (Medical): Patient declined     Lack of Transportation (Non-Medical): Patient declined   Physical Activity: Patient Declined (1/6/2024)    Received from [facility]    Exercise Vital Sign     Days of Exercise per Week: Patient declined     Minutes of Exercise per Session: Patient declined   Stress: Patient Declined (1/6/2024)    Received from [facility]    [place] citizen [employer] - Occupational Stress Questionnaire     Feeling of Stress : Patient declined   Social Connections: Patient Declined (1/6/2024)    Received from [facility]    Social Connection and Isolation Panel [NHANES]     Frequency of Communication with Friends and Family: Patient declined     Frequency of Social Gatherings with Friends and Family: Patient declined     Attends [employer] Services: Patient declined     Active Member of Clubs or Organizations: Patient declined     Attends Club or Organization Meetings: Patient declined     Marital Status: Patient declined   Intimate Partner Violence: Patient Declined (1/6/2024)    Received from [facility]    Humiliation, Afraid, Rape, and Kick questionnaire     Fear of Current or Ex-Partner: Patient declined     Emotionally Abused: Patient declined     Physically Abused: Patient declined     Sexually Abused: Patient declined   Housing Stability: Unknown (1/6/2024)    Received from [facility]    Housing Stability Vital Sign     Unable to Pay for  Housing in the Last Year: Patient refused     Number of Places Lived in the Last Year: Not on file     Unstable Housing in the Last Year: Patient refused       Subjective    Subjective Data: awake and alert    Objective    Physical Exam:   Assessment Type: Assess only  General Appearance: Alert, Awake  Respiratory Pattern: Shallow, Spontaneous  Chest Assessment: Chest expansion symmetrical  Bilateral Breath Sounds: Clear, Diminished, Scattered, Coarse  Cough: Non-productive, Moist, Strong  O2 Device: nasal cannula    Vitals:  Blood pressure 117/72, pulse 98, temperature 98.5 °F (36.9 °C), temperature source Oral, resp. rate 20, weight 60.3 kg (132 lb 15 oz), SpO2 99%, not currently breastfeeding.          Imaging and other studies: I have personally reviewed pertinent reports.      O2 Device: nasal cannula     Plan    Respiratory Plan: Mild Distress pathway        Resp Comments: respiratory protocol completed patient being admitted for NSTEMI with signiifcant pulmonary PMH for COPD now diagnosed with COVID patient uses nebulzuiers at home and will be continued here

## 2024-02-16 NOTE — PLAN OF CARE
Problem: Potential for Falls  Goal: Patient will remain free of falls  Description: INTERVENTIONS:  - Educate patient/family on patient safety including physical limitations  - Instruct patient to call for assistance with activity   - Consult OT/PT to assist with strengthening/mobility   - Keep Call bell within reach  - Keep bed low and locked with side rails adjusted as appropriate  - Keep care items and personal belongings within reach  - Initiate and maintain comfort rounds  - Make Fall Risk Sign visible to staff    - Apply yellow socks and bracelet for high fall risk patients  - Consider moving patient to room near nurses station  Outcome: Progressing     Problem: RESPIRATORY - ADULT  Goal: Achieves optimal ventilation and oxygenation  Description: INTERVENTIONS:  - Assess for changes in respiratory status  - Assess for changes in mentation and behavior  - Position to facilitate oxygenation and minimize respiratory effort  - Oxygen administered by appropriate delivery if ordered  - Initiate smoking cessation education as indicated  - Encourage broncho-pulmonary hygiene including cough, deep breathe, Incentive Spirometry  - Assess the need for suctioning and aspirate as needed  - Assess and instruct to report SOB or any respiratory difficulty  - Respiratory Therapy support as indicated  Outcome: Progressing

## 2024-02-16 NOTE — ED PROVIDER NOTES
History  Chief Complaint   Patient presents with    Shortness of Breath     Patient's son was bring patient home from [facility], and patient became short of breath.  Patient reports sob over past week at least, with wet sounding cough.     HPI    Patient is a 78-year-old female present emerged department for chest pain and shortness of breath.  Patient had become increased shortness of breath over the last few days.  Son [facility] facility and noticed she might need additional management from a medical perspective.  Patient notes a wet cough.  Denies any recent fevers or chills nausea or vomiting.  Denies any change in bowel bladder habits.  History includes CHF, CAD, COPD, hypertension hyperlipidemia.  Former smoker.    Prior to Admission Medications   Prescriptions Last Dose Informant Patient Reported? Taking?   Calcium-Magnesium-Vitamin D (CALCIUM 500 PO) Unknown  Yes No   Sig: Take 1,000 mg by mouth daily   HYDROcodone-acetaminophen (NORCO) 5-325 mg per tablet Unknown  Yes No   Sig: Take 1 tablet by mouth every 6 (six) hours as needed for pain   Zanubrutinib 80 MG CAPS Unknown  Yes No   Sig: Take 160 mg by mouth 2 (two) times a day   albuterol (VENTOLIN HFA) 90 mcg/act inhaler 2/17/2024  No Yes   Sig: Inhale 2 puffs every 6 (six) hours as needed for wheezing   aspirin (ECOTRIN LOW STRENGTH) 81 mg EC tablet   No No   Sig: Take 1 tablet (81 mg total) by mouth daily   buPROPion (WELLBUTRIN) 75 mg tablet Unknown  Yes No   Sig: Take 75 mg by mouth in the morning   budesonide-formoterol (SYMBICORT) 160-4.5 mcg/act inhaler 2/17/2024  No Yes   Sig: Inhale 2 puffs 2 (two) times a day Rinse mouth after use.   cariprazine (Vraylar) 1.5 MG capsule Unknown  Yes No   Sig: Take 1.5 mg by mouth daily   carvedilol (COREG) 25 mg tablet Unknown  Yes No   Sig: Take 25 mg by mouth 2 (two) times a day with meals   guaiFENesin (MUCINEX) 600 mg 12 hr tablet 2/16/2024  No Yes   Sig: Take 1  tablet (600 mg total) by mouth 2 (two) times a day   hydrOXYzine HCL (ATARAX) 25 mg tablet Unknown  No No   Sig: Take 1 tablet (25 mg total) by mouth every 6 (six) hours as needed for anxiety   hydrochlorothiazide (HYDRODIURIL) 25 mg tablet Unknown  No No   Sig: Take 1 tablet (25 mg total) by mouth daily   ipratropium (ATROVENT) 0.02 % nebulizer solution   No No   Sig: Take 2.5 mL (0.5 mg total) by nebulization 3 (three) times a day   lidocaine (LIDODERM) 5 %   No No   Sig: Apply 2 patches topically over 12 hours daily for 10 days Remove & Discard patch within 12 hours or as directed by MD   meclizine (ANTIVERT) 25 mg tablet   No No   Sig: Take 1 tablet (25 mg total) by mouth every 8 (eight) hours as needed for dizziness for up to 7 days   melatonin 3 mg 2/17/2024  No Yes   Sig: Take 1 tablet (3 mg total) by mouth daily at bedtime as needed (insomnia)   nitroglycerin (NITROSTAT) 0.4 mg SL tablet 2/17/2024  Yes Yes   Sig: Place 0.4 mg under the tongue every 5 (five) minutes as needed for chest pain   pantoprazole (PROTONIX) 40 mg tablet Unknown  No No   Sig: Take 1 tablet (40 mg total) by mouth daily   vilazodone (VIIBRYD) 40 mg tablet Unknown  Yes No   Sig: Take 40 mg by mouth daily with breakfast      Facility-Administered Medications: None       Past Medical History:   Diagnosis Date    Acute congestive heart failure (HCC) 8/27/2021    Anxiety     Cardiac disease     Chest pain 8/27/2021    Chronic pain disorder     COPD (chronic obstructive pulmonary disease) (HCC)     Depression     Heart disease     Hyperlipidemia     Hypertension     MI (myocardial infarction) (HCC)     MRSA (methicillin resistant Staphylococcus aureus)     Renal disorder     benign kidney tumor       Past Surgical History:   Procedure Laterality Date    APPENDECTOMY      ELBOW BURSA SURGERY Left 02/2018    D/T MRSA INFECTION    SPINAL FUSION      L7 L9       Family History   Problem Relation Age of Onset    Heart disease Mother     Cancer  Father      I have reviewed and agree with the history as documented.    E-Cigarette/Vaping    E-Cigarette Use Never User      E-Cigarette/Vaping Substances    Nicotine No     THC No     CBD No     Flavoring No     Other No     Unknown No      Social History     Tobacco Use    Smoking status: Former     Current packs/day: 0.00     Average packs/day: 1 pack/day for 60.0 years (60.0 ttl pk-yrs)     Types: Cigarettes     Start date:      Quit date: 2016     Years since quittin.1    Smokeless tobacco: Never    Tobacco comments:     She has close to a 60 pack-year smoking history, most recently has cut down to 4-5 cigarettes daily   Vaping Use    Vaping status: Never Used   Substance Use Topics    Alcohol use: Never    Drug use: No       Review of Systems   Constitutional:  Negative for chills and fever.   HENT:  Negative for ear pain and sore throat.    Eyes:  Negative for pain and visual disturbance.   Respiratory:  Positive for cough and shortness of breath.    Cardiovascular:  Negative for chest pain and palpitations.   Gastrointestinal:  Negative for abdominal pain and vomiting.   Genitourinary:  Negative for dysuria and hematuria.   Musculoskeletal:  Negative for arthralgias and back pain.   Skin:  Negative for color change and rash.   Neurological:  Negative for seizures and syncope.   All other systems reviewed and are negative.      Physical Exam  Physical Exam  Vitals and nursing note reviewed.   Constitutional:       General: She is not in acute distress.     Appearance: She is well-developed.   HENT:      Head: Normocephalic and atraumatic.   Eyes:      Conjunctiva/sclera: Conjunctivae normal.   Cardiovascular:      Rate and Rhythm: Tachycardia present.      Heart sounds: No murmur heard.  Pulmonary:      Effort: Pulmonary effort is normal. No respiratory distress.      Breath sounds: Examination of the right-lower field reveals decreased breath sounds and wheezing. Examination of the left-lower field  reveals decreased breath sounds and wheezing. Decreased breath sounds and wheezing present.   Abdominal:      Palpations: Abdomen is soft.      Tenderness: There is no abdominal tenderness.   Musculoskeletal:         General: No swelling.      Cervical back: Neck supple.   Skin:     General: Skin is warm and dry.      Capillary Refill: Capillary refill takes less than 2 seconds.   Neurological:      Mental Status: She is alert.   Psychiatric:         Mood and Affect: Mood normal.         Vital Signs  ED Triage Vitals [02/15/24 1726]   Temperature Pulse Respirations Blood Pressure SpO2   98.5 °F (36.9 °C) (!) 112 (!) 46 114/62 91 %      Temp Source Heart Rate Source Patient Position - Orthostatic VS BP Location FiO2 (%)   Oral Monitor Sitting Left arm --      Pain Score       No Pain           Vitals:    02/19/24 2217 02/20/24 0300 02/20/24 0720 02/20/24 1100   BP: 115/70 142/80 151/72 112/64   Pulse: 77      Patient Position - Orthostatic VS:  Lying Lying Lying         Visual Acuity      ED Medications  Medications   heparin (porcine) 25,000 units in 0.45% NaCl 250 mL infusion (premix) (10.1 Units/kg/hr × 55 kg (Order-Specific) Intravenous Restarted 2/20/24 1012)   heparin (porcine) injection 3,300 Units (has no administration in time range)   heparin (porcine) injection 1,650 Units (1,650 Units Intravenous Given 2/20/24 0025)   buPROPion (WELLBUTRIN) tablet 75 mg (75 mg Oral Given 2/20/24 0920)   budesonide-formoterol (SYMBICORT) 160-4.5 mcg/act inhaler 2 puff (2 puffs Inhalation Given 2/20/24 0919)   aspirin (ECOTRIN LOW STRENGTH) EC tablet 81 mg (81 mg Oral Given 2/20/24 0920)   carvedilol (COREG) tablet 25 mg (25 mg Oral Given 2/20/24 0920)   nitroglycerin (NITROSTAT) SL tablet 0.4 mg (0.4 mg Sublingual Given 2/17/24 0132)   pantoprazole (PROTONIX) EC tablet 40 mg (40 mg Oral Given 2/20/24 0920)   vilazodone (VIIBRYD) tablet 40 mg (40 mg Oral Given 2/20/24 0923)   ondansetron (ZOFRAN) injection 4 mg (has no  administration in time range)   aluminum-magnesium hydroxide-simethicone (MAALOX) oral suspension 30 mL (has no administration in time range)   docusate sodium (COLACE) capsule 100 mg (100 mg Oral Given 2/20/24 0920)   pravastatin (PRAVACHOL) tablet 80 mg (80 mg Oral Given 2/19/24 1705)   labetalol (NORMODYNE) injection 10 mg (10 mg Intravenous Not Given 2/17/24 2056)   dexamethasone (PF) (DECADRON) injection 6 mg (6 mg Intravenous Given 2/19/24 2048)   levalbuterol (XOPENEX) inhalation solution 1.25 mg (1.25 mg Nebulization Given 2/20/24 0720)   ipratropium (ATROVENT) 0.02 % inhalation solution 0.5 mg (0.5 mg Nebulization Given 2/20/24 0720)   albuterol (PROVENTIL HFA,VENTOLIN HFA) inhaler 2 puff (2 puffs Inhalation Given 2/19/24 1255)   albuterol inhalation solution 2.5 mg (2.5 mg Nebulization Given 2/20/24 1406)   baricitinib (OLUMIANT) tablet 2 mg (2 mg Oral Given 2/20/24 0920)   lidocaine (LIDODERM) 5 % patch 2 patch (2 patches Topical Not Given 2/18/24 0735)   dextromethorphan-guaiFENesin (ROBITUSSIN DM) oral syrup 10 mL (10 mL Oral Given 2/18/24 2210)   Zanubrutinib CAPS 160 mg (160 mg Oral Given 2/20/24 0921)   melatonin tablet 3 mg (3 mg Oral Given 2/19/24 2048)   furosemide (LASIX) injection 40 mg (40 mg Intravenous Given 2/20/24 0919)   sodium chloride 0.9 % infusion (75 mL/hr Intravenous New Bag 2/19/24 0521)   acetaminophen (TYLENOL) tablet 975 mg (975 mg Oral Given 2/19/24 2034)   midodrine (PROAMATINE) tablet 5 mg (5 mg Oral Given 2/20/24 1154)   ipratropium-albuterol (DUO-NEB) 0.5-2.5 mg/3 mL inhalation solution 3 mL (3 mL Nebulization Given 2/15/24 1739)   sodium chloride 0.9 % bolus 500 mL (0 mL Intravenous Stopped 2/15/24 1953)   ipratropium-albuterol (DUO-NEB) 0.5-2.5 mg/3 mL inhalation solution 3 mL (3 mL Nebulization Given 2/15/24 1940)   iohexol (OMNIPAQUE) 350 MG/ML injection (MULTI-DOSE) 85 mL (85 mL Intravenous Given 2/15/24 1847)   heparin (porcine) injection 3,300 Units (3,300 Units  Intravenous Given 2/15/24 1933)   aspirin tablet 325 mg (325 mg Oral Given 2/15/24 2030)   multi-electrolyte (ISOLYTE-S PH 7.4) bolus 500 mL (0 mL Intravenous Stopped 2/15/24 2152)   remdesivir (Veklury) 200 mg in sodium chloride 0.9 % 290 mL IVPB (0 mg Intravenous Stopped 2/15/24 2224)     Followed by   remdesivir (Veklury) 100 mg in sodium chloride 0.9 % 270 mL IVPB (100 mg Intravenous New Bag 2/19/24 2020)   ipratropium-albuterol (FOR EMS ONLY) (DUO-NEB) 0.5-2.5 mg/3 mL inhalation solution 3 mL (0 mL Does not apply Given to EMS 2/15/24 2126)   methylPREDNISolone sodium succinate (Solu-MEDROL) injection 40 mg (40 mg Intravenous Given 2/16/24 0503)   furosemide (LASIX) injection 40 mg (40 mg Intravenous Given 2/16/24 0616)   metoprolol (LOPRESSOR) injection 5 mg (5 mg Intravenous Given 2/16/24 0625)   magnesium sulfate 2 g/50 mL IVPB (premix) 2 g (0 g Intravenous Stopped 2/16/24 0834)   albumin human (FLEXBUMIN) 25 % injection 50 g (0 g Intravenous Stopped 2/16/24 1000)   lidocaine (LIDODERM) 5 % patch 1 patch (1 patch Topical Patch Removed 2/19/24 1023)   acetaminophen (Ofirmev) injection 1,000 mg (1,000 mg Intravenous New Bag 2/18/24 2204)   potassium chloride (Klor-Con M20) CR tablet 40 mEq (40 mEq Oral Given 2/20/24 0920)       Diagnostic Studies  Results Reviewed       Procedure Component Value Units Date/Time    Blood culture [[id number]]  (Abnormal)  (Susceptibility) Collected: 02/15/24 2030    Lab Status: Final result Specimen: Blood from Arm, Right Updated: 02/19/24 1537     Blood Culture Staphylococcus epidermidis     Gram Stain Result Gram positive cocci in clusters    Narrative:      Susceptibility testing will not be performed as this organism, when isolated from a single set of blood cultures, represents probable skin melissa contamination. Please call the Microbiology Laboratory within 5 days at [phone] if further workup is required.    Susceptibility       Staphylococcus epidermidis (1)        Antibiotic Interpretation Microscan Method Status    ZID Performed  Yes RENEE Final                       Blood Culture Identification Panel [[id number]]  (Abnormal) Collected: 02/15/24 2030    Lab Status: Final result Specimen: Blood from Arm, Right Updated: 02/19/24 1537     Staphylococcus epidermidis Detected     mecA/C (Methicillin-resistance gene) Detected    Narrative:      Film Array panel tests for 11 gram positive organisms, 15 gram negative organisms, 7 yeast species and 10 resistance genes.     Blood culture [[id number]]  (Abnormal)  (Susceptibility) Collected: 02/15/24 2030    Lab Status: Final result Specimen: Blood from Hand, Left Updated: 02/19/24 1536     Blood Culture Staphylococcus pettenkoferi     Gram Stain Result Gram positive cocci in clusters    Narrative:      Susceptibility testing will not be performed as this organism, when isolated from a single set of blood cultures, represents probable skin melissa contamination. Please call the Microbiology Laboratory within 5 days at [phone] if further workup is required.    Susceptibility       Staphylococcus pettenkoferi (1)       Antibiotic Interpretation Microscan Method Status    ZID Performed  Yes RENEE Final                       Ferritin [[id number]]  (Normal) Collected: 02/16/24 0521    Lab Status: Final result Specimen: Blood from Arm, Right Updated: 02/17/24 1529     Ferritin 121 ng/mL     Comprehensive metabolic panel [[id number]]  (Abnormal) Collected: 02/17/24 0444    Lab Status: Final result Specimen: Blood from Arm, Right Updated: 02/17/24 0608     Sodium 143 mmol/L      Potassium 3.6 mmol/L      Chloride 106 mmol/L      CO2 29 mmol/L      ANION GAP 8 mmol/L      BUN 24 mg/dL      Creatinine 0.88 mg/dL      Glucose 138 mg/dL      Calcium 9.0 mg/dL      AST 27 U/L      ALT 7 U/L      Alkaline Phosphatase 57 U/L      Total Protein 6.1 g/dL      Albumin 4.2 g/dL      Total Bilirubin 0.40 mg/dL      eGFR 63 ml/min/1.73sq m     Narrative:       National Kidney Disease Foundation guidelines for Chronic Kidney Disease (CKD):     Stage 1 with normal or high GFR (GFR > 90 mL/min/1.73 square meters)    Stage 2 Mild CKD (GFR = 60-89 mL/min/1.73 square meters)    Stage 3A Moderate CKD (GFR = 45-59 mL/min/1.73 square meters)    Stage 3B Moderate CKD (GFR = 30-44 mL/min/1.73 square meters)    Stage 4 Severe CKD (GFR = 15-29 mL/min/1.73 square meters)    Stage 5 End Stage CKD (GFR <15 mL/min/1.73 square meters)  Note: GFR calculation is accurate only with a steady state creatinine    CBC and differential [[id number]]  (Abnormal) Collected: 02/17/24 0444    Lab Status: Final result Specimen: Blood from Arm, Right Updated: 02/17/24 0556     WBC 6.59 Thousand/uL      RBC 3.43 Million/uL      Hemoglobin 10.5 g/dL      Hematocrit 32.2 %      MCV 94 fL      MCH 30.6 pg      MCHC 32.6 g/dL      RDW 12.0 %      MPV 10.7 fL      Platelets 156 Thousands/uL      nRBC 0 /100 WBCs      Neutrophils Relative 81 %      Immat GRANS % 0 %      Lymphocytes Relative 13 %      Monocytes Relative 6 %      Eosinophils Relative 0 %      Basophils Relative 0 %      Neutrophils Absolute 5.31 Thousands/µL      Immature Grans Absolute 0.02 Thousand/uL      Lymphocytes Absolute 0.84 Thousands/µL      Monocytes Absolute 0.41 Thousand/µL      Eosinophils Absolute 0.00 Thousand/µL      Basophils Absolute 0.01 Thousands/µL     Sedimentation rate, automated [[id number]]  (Normal) Collected: 02/16/24 0521    Lab Status: Final result Specimen: Blood from Arm, Right Updated: 02/17/24 0440     Sed Rate 21 mm/hour     C-reactive protein [[id number]]  (Abnormal) Collected: 02/16/24 0521    Lab Status: Final result Specimen: Blood from Arm, Right Updated: 02/17/24 0423     CRP 3.8 mg/L     APTT [[id number]]  (Abnormal) Collected: 02/16/24 1644    Lab Status: Final result Specimen: Blood from Arm, Right Updated: 02/16/24 1714     PTT 75 seconds     HS Troponin I 4hr [[id number]]  (Abnormal) Collected: 02/16/24  1309    Lab Status: Final result Specimen: Blood from Arm, Right Updated: 02/16/24 1337     hs TnI 4hr 4,751 ng/L      Delta 4hr hsTnI 877 ng/L     Hemoglobin A1C w/ EAG Estimation [[id number]] Collected: 02/15/24 1756    Lab Status: Final result Specimen: Blood from Arm, Left Updated: 02/16/24 1311     Hemoglobin A1C 5.3 %       mg/dl     HS Troponin I 2hr [[id number]]  (Abnormal) Collected: 02/16/24 1139    Lab Status: Final result Specimen: Blood from Arm, Right Updated: 02/16/24 1209     hs TnI 2hr 4,621 ng/L      Delta 2hr hsTnI 747 ng/L     APTT [[id number]]  (Abnormal) Collected: 02/16/24 1003    Lab Status: Final result Specimen: Blood from Arm, Right Updated: 02/16/24 1023     PTT 75 seconds     HS Troponin 0hr (reflex protocol) [[id number]]  (Abnormal) Collected: 02/16/24 0921    Lab Status: Final result Specimen: Blood from Arm, Right Updated: 02/16/24 1002     hs TnI 0hr 3,874 ng/L     Procalcitonin, Next Day AM Collection [[id number]]  (Normal) Collected: 02/16/24 0521    Lab Status: Final result Specimen: Blood from Arm, Right Updated: 02/16/24 0607     Procalcitonin 0.11 ng/ml     Lipid Panel with Direct LDL reflex [[id number]]  (Abnormal) Collected: 02/16/24 0521    Lab Status: Final result Specimen: Blood from Arm, Right Updated: 02/16/24 0558     Cholesterol 223 mg/dL      Triglycerides 143 mg/dL      HDL, Direct 55 mg/dL      LDL Calculated 139 mg/dL     Comprehensive metabolic panel [[id number]]  (Abnormal) Collected: 02/16/24 0521    Lab Status: Final result Specimen: Blood from Arm, Right Updated: 02/16/24 0558     Sodium 142 mmol/L      Potassium 4.2 mmol/L      Chloride 102 mmol/L      CO2 26 mmol/L      ANION GAP 14 mmol/L      BUN 15 mg/dL      Creatinine 0.97 mg/dL      Glucose 202 mg/dL      Calcium 9.2 mg/dL      AST 26 U/L      ALT 5 U/L      Alkaline Phosphatase 79 U/L      Total Protein 6.7 g/dL      Albumin 4.1 g/dL      Total Bilirubin 0.39 mg/dL      eGFR 56 ml/min/1.73sq m      Narrative:      National Kidney Disease Foundation guidelines for Chronic Kidney Disease (CKD):     Stage 1 with normal or high GFR (GFR > 90 mL/min/1.73 square meters)    Stage 2 Mild CKD (GFR = 60-89 mL/min/1.73 square meters)    Stage 3A Moderate CKD (GFR = 45-59 mL/min/1.73 square meters)    Stage 3B Moderate CKD (GFR = 30-44 mL/min/1.73 square meters)    Stage 4 Severe CKD (GFR = 15-29 mL/min/1.73 square meters)    Stage 5 End Stage CKD (GFR <15 mL/min/1.73 square meters)  Note: GFR calculation is accurate only with a steady state creatinine    Magnesium [[id number]]  (Abnormal) Collected: 02/16/24 0521    Lab Status: Final result Specimen: Blood from Arm, Right Updated: 02/16/24 0558     Magnesium 1.7 mg/dL     C-reactive protein [[id number]]  (Abnormal) Collected: 02/16/24 0521    Lab Status: Final result Specimen: Blood from Arm, Right Updated: 02/16/24 0558     CRP 3.8 mg/L     CBC and differential [[id number]]  (Abnormal) Collected: 02/16/24 0521    Lab Status: Final result Specimen: Blood from Arm, Right Updated: 02/16/24 0540     WBC 9.20 Thousand/uL      RBC 4.37 Million/uL      Hemoglobin 13.4 g/dL      Hematocrit 42.1 %      MCV 96 fL      MCH 30.7 pg      MCHC 31.8 g/dL      RDW 11.9 %      MPV 10.3 fL      Platelets 302 Thousands/uL      nRBC 0 /100 WBCs      Neutrophils Relative 49 %      Immat GRANS % 0 %      Lymphocytes Relative 48 %      Monocytes Relative 3 %      Eosinophils Relative 0 %      Basophils Relative 0 %      Neutrophils Absolute 4.42 Thousands/µL      Immature Grans Absolute 0.03 Thousand/uL      Lymphocytes Absolute 4.45 Thousands/µL      Monocytes Absolute 0.28 Thousand/µL      Eosinophils Absolute 0.01 Thousand/µL      Basophils Absolute 0.01 Thousands/µL     APTT [[id number]]  (Abnormal) Collected: 02/16/24 0206    Lab Status: Final result Specimen: Blood from Arm, Right Updated: 02/16/24 0230      seconds     Procalcitonin [[id number]]  (Normal) Collected: 02/15/24 6068     Lab Status: Final result Specimen: Blood from Arm, Left Updated: 02/15/24 2241     Procalcitonin <0.05 ng/ml     HS Troponin I 4hr [[id number]]  (Abnormal) Collected: 02/15/24 2154    Lab Status: Final result Specimen: Blood from Arm, Right Updated: 02/15/24 2231     hs TnI 4hr 3,138 ng/L      Delta 4hr hsTnI 1,698 ng/L     C-reactive protein [[id number]]  (Normal) Collected: 02/15/24 1756    Lab Status: Final result Specimen: Blood from Arm, Left Updated: 02/15/24 2113     CRP 2.0 mg/L     Lactic acid, plasma (w/reflex if result > 2.0) [[id number]]  (Normal) Collected: 02/15/24 2030    Lab Status: Final result Specimen: Blood from Arm, Right Updated: 02/15/24 2103     LACTIC ACID 0.8 mmol/L     Narrative:      Result may be elevated if tourniquet was used during collection.    HS Troponin I 2hr [[id number]]  (Abnormal) Collected: 02/15/24 1956    Lab Status: Final result Specimen: Blood from Arm, Right Updated: 02/15/24 2025     hs TnI 2hr 3,047 ng/L      Delta 2hr hsTnI 1,607 ng/L     APTT [[id number]]  (Normal) Collected: 02/15/24 1908    Lab Status: Final result Specimen: Blood from Arm, Right Updated: 02/15/24 1938     PTT 30 seconds     Protime-INR [[id number]]  (Normal) Collected: 02/15/24 1908    Lab Status: Final result Specimen: Blood from Arm, Right Updated: 02/15/24 1938     Protime 14.1 seconds      INR 1.03    HS Troponin 0hr (reflex protocol) [[id number]]  (Abnormal) Collected: 02/15/24 1756    Lab Status: Final result Specimen: Blood from Arm, Left Updated: 02/15/24 1840     hs TnI 0hr 1,440 ng/L     COVID/FLU/RSV [[id number]]  (Abnormal) Collected: 02/15/24 1743    Lab Status: Final result Specimen: Nares from Nose Updated: 02/15/24 1839     SARS-CoV-2 Positive     INFLUENZA A PCR Negative     INFLUENZA B PCR Negative     RSV PCR Negative    Narrative:      FOR PEDIATRIC PATIENTS - copy/paste COVID Guidelines URL to browser:  [email or web address]    SARS-CoV-2 assay is a Nucleic Acid Amplification assay intended for the  qualitative detection of nucleic acid from SARS-CoV-2 in nasopharyngeal  swabs. Results are for the presumptive identification of SARS-CoV-2 RNA.    Positive results are indicative of infection with SARS-CoV-2, the virus  causing COVID-19, but do not rule out bacterial infection or co-infection  with other viruses. Laboratories within the [place] and its  territories are required to report all positive results to the appropriate  public health authorities. Negative results do not preclude SARS-CoV-2  infection and should not be used as the sole basis for treatment or other  patient management decisions. Negative results must be combined with  clinical observations, patient history, and epidemiological information.  This test has not been FDA cleared or approved.    This test has been authorized by FDA under an Emergency Use Authorization  (EUA). This test is only authorized for the duration of time the  declaration that circumstances exist justifying the authorization of the  emergency use of an in vitro diagnostic tests for detection of SARS-CoV-2  virus and/or diagnosis of COVID-19 infection under section 564(b)(1) of  the Act, 21 U.S.C. 360bbb-3(b)(1), unless the authorization is terminated  or revoked sooner. The test has been validated but independent review by FDA  and CLIA is pending.    Test performed using [employer] GeneXpert: This RT-PCR assay targets N2,  a region unique to SARS-CoV-2. A conserved region in the E-gene was chosen  for pan-Sarbecovirus detection which includes SARS-CoV-2.    According to CMS-2020-01-R, this platform meets the definition of high-throughput technology.    B-Type Natriuretic Peptide(BNP) [[id number]]  (Abnormal) Collected: 02/15/24 1756    Lab Status: Final result Specimen: Blood from Arm, Left Updated: 02/15/24 7499     BNP  135 pg/mL     Comprehensive metabolic panel [[id number]]  (Abnormal) Collected: 02/15/24 1756    Lab Status: Final result Specimen: Blood from Arm, Left Updated: 02/15/24 1832     Sodium 142 mmol/L      Potassium 4.0 mmol/L      Chloride 107 mmol/L      CO2 25 mmol/L      ANION GAP 10 mmol/L      BUN 14 mg/dL      Creatinine 0.90 mg/dL      Glucose 167 mg/dL      Calcium 8.7 mg/dL      AST 17 U/L      ALT 3 U/L      Alkaline Phosphatase 70 U/L      Total Protein 6.3 g/dL      Albumin 3.9 g/dL      Total Bilirubin 0.35 mg/dL      eGFR 61 ml/min/1.73sq m     Narrative:      National Kidney Disease Foundation guidelines for Chronic Kidney Disease (CKD):     Stage 1 with normal or high GFR (GFR > 90 mL/min/1.73 square meters)    Stage 2 Mild CKD (GFR = 60-89 mL/min/1.73 square meters)    Stage 3A Moderate CKD (GFR = 45-59 mL/min/1.73 square meters)    Stage 3B Moderate CKD (GFR = 30-44 mL/min/1.73 square meters)    Stage 4 Severe CKD (GFR = 15-29 mL/min/1.73 square meters)    Stage 5 End Stage CKD (GFR <15 mL/min/1.73 square meters)  Note: GFR calculation is accurate only with a steady state creatinine    CBC and differential [[id number]] Collected: 02/15/24 1756    Lab Status: Final result Specimen: Blood from Arm, Left Updated: 02/15/24 1813     WBC 6.04 Thousand/uL      RBC 4.15 Million/uL      Hemoglobin 12.8 g/dL      Hematocrit 40.1 %      MCV 97 fL      MCH 30.8 pg      MCHC 31.9 g/dL      RDW 11.9 %      MPV 10.3 fL      Platelets 182 Thousands/uL      nRBC 0 /100 WBCs      Neutrophils Relative 74 %      Immat GRANS % 0 %      Lymphocytes Relative 18 %      Monocytes Relative 5 %      Eosinophils Relative 3 %      Basophils Relative 0 %      Neutrophils Absolute 4.44 Thousands/µL      Immature Grans Absolute 0.01 Thousand/uL      Lymphocytes Absolute 1.07 Thousands/µL      Monocytes Absolute 0.30 Thousand/µL      Eosinophils Absolute 0.20 Thousand/µL      Basophils Absolute 0.02 Thousands/µL                    CT  chest with contrast   Final Result by Gal Stephenson MD (02/15 1934)      No evidence for pulmonary embolism.      Mild pulmonary edema.      Scattered areas of tree-in-bud opacity consistent with endobronchial impaction/infection.               Workstation performed: [id number]         XR chest 1 view portable   Final Result by Alaina Raya MD (02/16 1245)      Mild pulmonary interstitial edema.            Workstation performed: [id number]                    Procedures  Procedures         ED Course  ED Course as of 02/20/24 1411   Thu Feb 15, 2024   1844 hs TnI 0hr(!): 1,440                                 ADRIEN Risk Score      Flowsheet Row Most Recent Value   Age >= 65 1 Filed at: 02/15/2024 1950   Known CAD (stenosis >= 50%) 1 Filed at: 02/15/2024 1950   Recent (<=24 hrs) Service Angina 1 Filed at: 02/15/2024 1950   ST Deviation >= 0.5 mm 0 Filed at: 02/15/2024 1950   3+ CAD Risk Factors (FHx, HTN, HLP, DM, Smoker) 1 Filed at: 02/15/2024 1950   Aspirin Use Past 7 Days 0 Filed at: 02/15/2024 1950   Elevated Cardiac Markers 1 Filed at: 02/15/2024 1950   ADRIEN Risk Score (Calculated) 5 Filed at: 02/15/2024 1950                    Medical Decision Making  Differential pneumonia, URI, pleural effusions, ACS.    Patient is a 78-year-old female present emerged department with increased work of breathing, to tachypnea and requiring DuoNebs.  Patient is using accessory muscles and initially seems fatigued.  DuoNebs helped with work of breathing.    CBC and CMP overall unremarkable.  Troponin noted to be significantly elevated.  X-ray was performed of the chest which shows some interstitial edema.  Given the patient's presentation we will order a CT to further evaluate.  Tested positive for COVID.    CT of the chest does not show any pulmonary embolism some mild pulmonary edema.  Patient was started on heparin given the elevation of the troponins.  Discussed case with hospitalist for admission and further  management.    EKG: rate 112, sinus tachycardia without signs of acute ischemic change. Compared to prior     Amount and/or Complexity of Data Reviewed  Labs: ordered. Decision-making details documented in ED Course.  Radiology: ordered and independent interpretation performed.  ECG/medicine tests: ordered and independent interpretation performed.    Risk  Prescription drug management.  Decision regarding hospitalization.             Disposition  Final diagnoses:   Dyspnea   COVID-19   NSTEMI (non-ST elevated myocardial infarction) (HCC)     Time reflects when diagnosis was documented in both MDM as applicable and the Disposition within this note       Time User Action Codes Description Comment    2/15/2024  6:45 PM Zhanna Cordon Add [R06.00] Dyspnea     2/15/2024  6:45 PM Zhanna Cordon M Add [U07.1] COVID-19     2/15/2024  6:45 PM Zhanna Cordon M Add [I21.4] NSTEMI (non-ST elevated myocardial infarction) (HCC)     2/18/2024  3:36 PM Mio Montgomery Add [R07.9] Chest pain     2/18/2024  3:36 PM Mio Montgomery Add [R79.89] Elevated troponin     2/18/2024  3:36 PM Mio Montgomery Add [R94.31] Abnormal EKG     2/19/2024  6:36 AM Ruben Hillman Modify [R07.9] Chest pain     2/19/2024  6:36 AM Ruben Hillman Modify [R79.89] Elevated troponin     2/19/2024  6:36 AM Ruben Hillman Modify [R94.31] Abnormal EKG           ED Disposition       ED Disposition   Admit    Condition   Stable    Date/Time   Thu Feb 15, 2024 1946    Comment   Case was discussed with hospitalist and the patient's admission status was agreed to be Admission Status: inpatient status to the service of Dr. Shelton .               Follow-up Information    None         Current Discharge Medication List        CONTINUE these medications which have NOT CHANGED    Details   albuterol (VENTOLIN HFA) 90 mcg/act inhaler Inhale 2 puffs every 6 (six) hours as needed for wheezing  Qty: 1 Inhaler, Refills: 0    Comments: Substitution to a formulary  equivalent within the same pharmaceutical class is authorized.  Associated Diagnoses: COPD exacerbation (HCC)      budesonide-formoterol (SYMBICORT) 160-4.5 mcg/act inhaler Inhale 2 puffs 2 (two) times a day Rinse mouth after use.  Qty: 10.2 g, Refills: 0    Associated Diagnoses: COPD exacerbation (HCC)      guaiFENesin (MUCINEX) 600 mg 12 hr tablet Take 1 tablet (600 mg total) by mouth 2 (two) times a day  Qty: 60 tablet, Refills: 0    Associated Diagnoses: COPD exacerbation (HCC)      melatonin 3 mg Take 1 tablet (3 mg total) by mouth daily at bedtime as needed (insomnia)  Qty: 30 tablet, Refills: 0    Associated Diagnoses: Major depressive disorder, recurrent episode, moderate (HCC)      nitroglycerin (NITROSTAT) 0.4 mg SL tablet Place 0.4 mg under the tongue every 5 (five) minutes as needed for chest pain      aspirin (ECOTRIN LOW STRENGTH) 81 mg EC tablet Take 1 tablet (81 mg total) by mouth daily  Qty: 30 tablet, Refills: 0    Associated Diagnoses: COPD (chronic obstructive pulmonary disease) (HCC)      buPROPion (WELLBUTRIN) 75 mg tablet Take 75 mg by mouth in the morning      Calcium-Magnesium-Vitamin D (CALCIUM 500 PO) Take 1,000 mg by mouth daily      cariprazine (Vraylar) 1.5 MG capsule Take 1.5 mg by mouth daily      carvedilol (COREG) 25 mg tablet Take 25 mg by mouth 2 (two) times a day with meals      hydrochlorothiazide (HYDRODIURIL) 25 mg tablet Take 1 tablet (25 mg total) by mouth daily  Qty: 30 tablet, Refills: 0    Associated Diagnoses: Essential hypertension      HYDROcodone-acetaminophen (NORCO) 5-325 mg per tablet Take 1 tablet by mouth every 6 (six) hours as needed for pain      hydrOXYzine HCL (ATARAX) 25 mg tablet Take 1 tablet (25 mg total) by mouth every 6 (six) hours as needed for anxiety  Refills: 0    Associated Diagnoses: COPD with acute exacerbation (HCC)      ipratropium (ATROVENT) 0.02 % nebulizer solution Take 2.5 mL (0.5 mg total) by nebulization 3 (three) times a day  Qty: 225  mL, Refills: 0    Associated Diagnoses: COPD with acute exacerbation (HCC)      lidocaine (LIDODERM) 5 % Apply 2 patches topically over 12 hours daily for 10 days Remove & Discard patch within 12 hours or as directed by MD  Qty: 20 patch, Refills: 0    Associated Diagnoses: COPD exacerbation (HCC)      meclizine (ANTIVERT) 25 mg tablet Take 1 tablet (25 mg total) by mouth every 8 (eight) hours as needed for dizziness for up to 7 days  Qty: 10 tablet, Refills: 0    Associated Diagnoses: BPPV (benign paroxysmal positional vertigo)      pantoprazole (PROTONIX) 40 mg tablet Take 1 tablet (40 mg total) by mouth daily  Qty: 30 tablet, Refills: 0    Associated Diagnoses: Anemia, unspecified type      vilazodone (VIIBRYD) 40 mg tablet Take 40 mg by mouth daily with breakfast      Zanubrutinib 80 MG CAPS Take 160 mg by mouth 2 (two) times a day             No discharge procedures on file.    PDMP Review         Value Time User    PDMP Reviewed  Yes 11/21/2023  9:21 AM Uche Purdy MD            ED Provider  Electronically Signed by             Zhanna Cordon DO  02/20/24 7651

## 2024-02-16 NOTE — ASSESSMENT & PLAN NOTE
Lab Results   Component Value Date    HSTNI0 1,440 (H) 02/15/2024    HSTNI2 3,047 (H) 02/15/2024    HSTNID2 1,607 (H) 02/15/2024      CT chest showing no evidence of pulmonary embolism, did note mild pulmonary edema  No chest pain present, but significant SOB, anginal equivalent  EKG: Sinus Tachycardia with no obvious STEMI  Likely non ST elevation MI   ADRIEN Score: 5    The patient's presentation with shortness of breath was somewhat sudden according to the son, he was driving her home from the rehab facility if she got acutely short of breath and hypoxemic again.  Certainly it is possible that the COVID infection may have triggered some degree of underlying CAD leading to an NSTEMI.  Continue heparin infusion  Cardiology follow-up appreciated

## 2024-02-16 NOTE — ED NOTES
B/P meds held for now as pt's B/P is low. MD Olmos made aware.     Helen Tyler RN  02/16/24 6359

## 2024-02-16 NOTE — ASSESSMENT & PLAN NOTE
POA as evidenced by tachycardia, tachypnea  Sepsis Unlikely as no source present    Likely 2/2/ NSTEMI; less likely thought to be infectious etiology    Plan:  Blood clx  Procal trend  Trend WBC Fever Curve  Abx- monitor off

## 2024-02-16 NOTE — ED NOTES
Request sent to Pharmacy re: deliver Wellbutrin and Symbicort. Awaiting delivery     Gabino Joy RN  02/15/24 2007

## 2024-02-16 NOTE — ASSESSMENT & PLAN NOTE
Continue home meds; initiate lisinopril as part of NSTEMI management  Monitor blood pressure  PRN IV Labetalol for SBP > 160

## 2024-02-16 NOTE — NURSING NOTE
Continual elevation of Troponins acknowledged by SLIM. Patient is on continual Heparin Drip, Cardiology Consultation done. Patient does not complain about chest pain. All vitals WNL.

## 2024-02-16 NOTE — ASSESSMENT & PLAN NOTE
Lab Results   Component Value Date    SARSCOV2 Positive (A) 02/15/2024    SARSCOV2 Negative 12/12/2023    SARSCOV2 Negative 10/28/2023       Symptoms of COVID-19 SYMPTOMS: shortness of breath and Tachycardia (more than 100 beats per min)  Vaccine status: vaccinated  The patient does have acute hypoxemic respiratory failure given the same    CT chest w contrast showing Scattered areas of tree-in-bud opacity consistent with endobronchial impaction/infection      Continue remdesevir for 5 days, Dexamethasone for 10 days  And baricitinib given worsening oxygen requirement on 2/16  Check inflammatory markers  Therapeutic Anticoagulation per guidelines -the patient is on heparin infusion given her elevated troponins as well  Attempt to wean oxygen if possible

## 2024-02-16 NOTE — ASSESSMENT & PLAN NOTE
Currently SpO2: 99 % on Nasal Cannula O2 Flow Rate (L/min): 6 L/min    At baseline, uses 4L NC  CT Imaging showing mild pulm edema, scattered areas of tree-in-bud opacity    Plan:  Treat underlying NSTEMI, COVID, mild pulm edema  Wean O2 as able

## 2024-02-16 NOTE — ASSESSMENT & PLAN NOTE
POA as evidenced by tachycardia, tachypnea  Sepsis source: COVID postiive, CT imaging showing signs of infection    Continue COVID treatment with baricitinib, dexamethasone, remdesivir  Continue to attempt wean oxygen  Prognosis overall guarded given age, comorbidities, poor functional status at baseline, discussed with patient and son in detail, both endorse DNR/DNI status.

## 2024-02-16 NOTE — PROGRESS NOTES
[facility]  Progress Note  Name: Lauren Quintero I  MRN: [id number]  Unit/Bed#: ED 20 I Date of Admission: 2/15/2024   Date of Service: 2/16/2024 I Hospital Day: 1    Assessment/Plan   COVID-19  Assessment & Plan  Lab Results   Component Value Date    SARSCOV2 Positive (A) 02/15/2024    SARSCOV2 Negative 12/12/2023    SARSCOV2 Negative 10/28/2023       Symptoms of COVID-19 SYMPTOMS: shortness of breath and Tachycardia (more than 100 beats per min)  Vaccine status: vaccinated  The patient does have acute hypoxemic respiratory failure given the same    CT chest w contrast showing Scattered areas of tree-in-bud opacity consistent with endobronchial impaction/infection      Continue remdesevir for 5 days, Dexamethasone for 10 days  And baricitinib given worsening oxygen requirement on 2/16  Check inflammatory markers  Therapeutic Anticoagulation per guidelines -the patient is on heparin infusion given her elevated troponins as well  Attempt to wean oxygen if possible      * NSTEMI (non-ST elevated myocardial infarction) (HCC)  Assessment & Plan  Lab Results   Component Value Date    HSTNI0 1,440 (H) 02/15/2024    HSTNI2 3,047 (H) 02/15/2024    HSTNID2 1,607 (H) 02/15/2024      CT chest showing no evidence of pulmonary embolism, did note mild pulmonary edema  No chest pain present, but significant SOB, anginal equivalent  EKG: Sinus Tachycardia with no obvious STEMI  Likely non ST elevation MI   ADRIEN Score: 5    The patient's presentation with shortness of breath was somewhat sudden according to the son, he was driving her home from the rehab facility if she got acutely short of breath and hypoxemic again.  Certainly it is possible that the COVID infection may have triggered some degree of underlying CAD leading to an NSTEMI.  Continue heparin infusion  Cardiology follow-up appreciated      COPD without exacerbation (HCC)  Assessment & Plan  Not wheezing but SOB due to COVID  Continue  steroids    Major depressive disorder, recurrent episode, moderate (HCC)  Assessment & Plan  Continue wellbutrin, vilazodone  Also takes Vraylar; recommend bringing from home as we do not have it on formulary    Sepsis (HCC)  Assessment & Plan  POA as evidenced by tachycardia, tachypnea  Sepsis source: COVID postiive, CT imaging showing signs of infection    Continue COVID treatment with baricitinib, dexamethasone, remdesivir  Continue to attempt wean oxygen  Prognosis overall guarded given age, comorbidities, poor functional status at baseline, discussed with patient and son in detail, both endorse DNR/DNI status.      Acute on chronic respiratory failure with hypoxia (HCC)  Assessment & Plan  Currently SpO2: 99 % on Nasal Cannula O2 Flow Rate (L/min): 6 L/min    At baseline, uses 4L NC  CT Imaging showing mild pulm edema, scattered areas of tree-in-bud opacity    Plan:  Treat underlying NSTEMI, COVID, mild pulm edema  Wean O2 as able      Essential hypertension  Assessment & Plan    Continue home meds; initiate lisinopril as part of NSTEMI management  Monitor blood pressure  PRN IV Labetalol for SBP > 160               VTE Pharmacologic Prophylaxis:   Pharmacologic: Heparin Drip  Mechanical VTE Prophylaxis in Place: Yes    Patient Centered Rounds: I have performed bedside rounds with nursing staff today.    Discussions with Specialists or Other Care Team Provider: Discussed with care management team    Education and Discussions with Family / Patient:       Time Spent for Care: 45 minutes.  More than 50% of total time spent on counseling and coordination of care as described above.    Current Length of Stay: 1 day(s)    Current Patient Status: Inpatient   Certification Statement: The patient will continue to require additional inpatient hospital stay due to need for IV treatments    Discharge Plan:       Code Status: Level 3 - DNAR and DNI      Subjective:     The patient was evaluated this morning in the emergency  department.  She appears to be fairly short of breath this morning.  She is on 15 L of oxygen.  She is also hypotensive.  Despite that she is not encephalopathic and is able to have a conversation.  The patient states that she feels that the shortness of breath and the cough are her main issues right now.  She denies any chest pain nausea or vomiting.    Objective:     Vitals:   Temp (24hrs), Av.5 °F (36.9 °C), Min:98.5 °F (36.9 °C), Max:98.5 °F (36.9 °C)    Temp:  [98.5 °F (36.9 °C)] 98.5 °F (36.9 °C)  HR:  [] 95  Resp:  [17-46] 17  BP: ()/() 109/66  SpO2:  [80 %-99 %] 99 %  Body mass index is 20.21 kg/m².     Input and Output Summary (last 24 hours):       Intake/Output Summary (Last 24 hours) at 2024 1346  Last data filed at 2024 1000  Gross per 24 hour   Intake 1250 ml   Output --   Net 1250 ml       Physical Exam:     Physical Exam  Vitals and nursing note reviewed.   Constitutional:       Appearance: Normal appearance. She is normal weight. She is ill-appearing.      Comments: Chronically ill-appearing in bed awake   HENT:      Head: Normocephalic and atraumatic.      Right Ear: External ear normal.      Left Ear: External ear normal.      Nose: Nose normal. No congestion.      Mouth/Throat:      Mouth: Mucous membranes are moist.      Pharynx: Oropharynx is clear. No oropharyngeal exudate or posterior oropharyngeal erythema.   Eyes:      General: No scleral icterus.        Right eye: No discharge.         Left eye: No discharge.      Extraocular Movements: Extraocular movements intact.      Conjunctiva/sclera: Conjunctivae normal.      Pupils: Pupils are equal, round, and reactive to light.   Cardiovascular:      Rate and Rhythm: Normal rate and regular rhythm.      Pulses: Normal pulses.      Heart sounds: Normal heart sounds. No murmur heard.     No friction rub. No gallop.   Pulmonary:      Effort: Pulmonary effort is normal. No respiratory distress.      Breath sounds:  Normal breath sounds. No stridor. No wheezing, rhonchi or rales.   Chest:      Chest wall: No tenderness.   Abdominal:      General: Abdomen is flat. Bowel sounds are normal. There is no distension.      Palpations: Abdomen is soft. There is no mass.      Tenderness: There is no abdominal tenderness. There is no guarding or rebound.   Musculoskeletal:         General: No swelling, tenderness, deformity or signs of injury. Normal range of motion.      Cervical back: Normal range of motion and neck supple. No rigidity. No muscular tenderness.   Skin:     General: Skin is warm and dry.      Capillary Refill: Capillary refill takes less than 2 seconds.      Coloration: Skin is not jaundiced or pale.      Findings: No bruising, erythema, lesion or rash.   Neurological:      General: No focal deficit present.      Mental Status: She is alert and oriented to person, place, and time. Mental status is at baseline.      Cranial Nerves: No cranial nerve deficit.      Sensory: No sensory deficit.      Motor: No weakness.      Coordination: Coordination normal.   Psychiatric:         Mood and Affect: Mood normal.         Behavior: Behavior normal.         Thought Content: Thought content normal.         Judgment: Judgment normal.         Additional Data:     Labs:    Results from last 7 days   Lab Units 02/16/24  0521   WBC Thousand/uL 9.20   HEMOGLOBIN g/dL 13.4   HEMATOCRIT % 42.1   PLATELETS Thousands/uL 302   NEUTROS PCT % 49   LYMPHS PCT % 48*   MONOS PCT % 3*   EOS PCT % 0     Results from last 7 days   Lab Units 02/16/24  0521   SODIUM mmol/L 142   POTASSIUM mmol/L 4.2   CHLORIDE mmol/L 102   CO2 mmol/L 26   BUN mg/dL 15   CREATININE mg/dL 0.97   ANION GAP mmol/L 14   CALCIUM mg/dL 9.2   ALBUMIN g/dL 4.1   TOTAL BILIRUBIN mg/dL 0.39   ALK PHOS U/L 79   ALT U/L 5*   AST U/L 26   GLUCOSE RANDOM mg/dL 202*     Results from last 7 days   Lab Units 02/15/24  1908   INR  1.03         Results from last 7 days   Lab Units  02/15/24  1756   HEMOGLOBIN A1C % 5.3     Results from last 7 days   Lab Units 02/16/24  0521 02/15/24  2030 02/15/24  1756   LACTIC ACID mmol/L  --  0.8  --    PROCALCITONIN ng/ml 0.11  --  <0.05           * I Have Reviewed All Lab Data Listed Above.  * Additional Pertinent Lab Tests Reviewed: All Labs For Current Hospital Admission Reviewed      Recent Cultures (last 7 days):     Results from last 7 days   Lab Units 02/15/24  2030   BLOOD CULTURE  Received in Microbiology Lab. Culture in Progress.  Received in Microbiology Lab. Culture in Progress.       Last 24 Hours Medication List:   Current Facility-Administered Medications   Medication Dose Route Frequency Provider Last Rate    albuterol  2 puff Inhalation Q4H PRN [facility] Nikita, DO      albuterol  2.5 mg Nebulization Q4H PRN TAMMIE Christensen      aluminum-magnesium hydroxide-simethicone  30 mL Oral Q6H PRN [facility] Ilanan, DO      aspirin  81 mg Oral Daily [facility]      baricitinib  2 mg Oral Q24H Kenny Stone MD      budesonide-formoterol  2 puff Inhalation BID [facility] NaviChicagoan, DO      buPROPion  75 mg Oral Daily [facility], [facility]      carvedilol  25 mg Oral BID With Meals [facility] Nikita, DO      dexamethasone  6 mg Intravenous Q24H [facility]      docusate sodium  100 mg Oral BID [facility], [facility]      heparin (porcine)  3-20 Units/kg/hr (Order-Specific) Intravenous Titrated [facility] Parameswaran, DO 9 Units/kg/hr (02/16/24 1110)    heparin (porcine)  1,650 Units Intravenous Q6H PRN [facility] Paramwaran, DO      heparin (porcine)  3,300 Units Intravenous Q6H PRN [facility] Paramemeliwaran, DO      ipratropium  0.5 mg Nebulization BID [facility] Parameswaran, DO      labetalol  10 mg Intravenous Q6H PRN [facility]      levalbuterol  1.25 mg Nebulization BID [facility], DO      midodrine  10 mg Oral TID AC Kenny Stone MD      nitroglycerin  0.4 mg Sublingual Q5 Min PRN Jose  Paramamish, DO      ondansetron  4 mg Intravenous Q6H PRN [facility], [facility]      pantoprazole  40 mg Oral Daily [facility], [facility]      pravastatin  80 mg Oral QPM [facility]      remdesivir  100 mg Intravenous Q24H [facility], DO      vilazodone  40 mg Oral Daily With Breakfast [facility] Nikita, DO          Today, Patient Was Seen By: Kenny Alan MD    ** Please Note: Dictation voice to text software may have been used in the creation of this document. **

## 2024-02-16 NOTE — ED NOTES
Respiratory in to see pt. Filtered ventilation machine in use and functioning.     Gabino Joy RN  02/15/24 9102

## 2024-02-16 NOTE — ASSESSMENT & PLAN NOTE
Continue wellbutrin, vilazodone  Also takes Vraylar; recommend bringing from home as we do not have it on formulary

## 2024-02-16 NOTE — QUICK NOTE
Notified by nurse about increasing work of breathing, tachycardia and oxygen saturation in the low 80's. Solumedrol, furosemide and albuterol administrated. Respiratory therapist at bedside and changed from nasal cannula to mid-flow, intervention was effective bringing oxygen saturation to 92-94%. Reviewed blood work and noted magnesium 1.7, replacement ordered. Nurse at bedside was instructed to monitor patient oxygen demand closely.

## 2024-02-16 NOTE — RESPIRATORY THERAPY NOTE
RT Ventilator Management Note  Lauren Quintero 78 y.o. female MRN: [id number]  Unit/Bed#: FT 04 Encounter: [id number]      Daily Screen    No data found in the last 10 encounters.           Physical Exam:   Assessment Type: Assess only  General Appearance: Alert, Awake  Respiratory Pattern: Irregular, Labored, Accessory muscle use, Tachypneic, Spontaneous  Chest Assessment: Chest expansion symmetrical  Bilateral Breath Sounds: Diminished, Scattered, Coarse, Crackles, Expiratory wheezes, Rales  Cough: Non-productive, Moist, Strong  O2 Device: (S) MFNC  Subjective Data: awake and alert      Resp Comments: patient placed on MFNC at this time due to acute shortness of breath unresolved by increased nasal cannula

## 2024-02-16 NOTE — H&P
[facility]   H&P  Name: Lauren Quintero I  MRN: [id number]  Unit/Bed#: FT 04 I Date of Admission: 2/15/2024   Date of Service: 2/15/2024 I Hospital Day: 0    Acute on chronic respiratory failure with hypoxia (HCC)  Assessment & Plan  Currently SpO2: 99 % on Nasal Cannula O2 Flow Rate (L/min): 6 L/min    At baseline, uses 4L NC  CT Imaging showing mild pulm edema, scattered areas of tree-in-bud opacity    Plan:  Treat underlying NSTEMI, COVID, mild pulm edema  Wean O2 as able      * NSTEMI (non-ST elevated myocardial infarction) ([facility])  Assessment & Plan  Lab Results   Component Value Date    HSTNI0 1,440 (H) 02/15/2024    HSTNI2 3,047 (H) 02/15/2024    HSTNID2 1,607 (H) 02/15/2024      CT chest showing no evidence of pulmonary embolism, did note mild pulmonary edema  No chest pain present, but significant SOB, anginal equivalent  EKG: Sinus Tachycardia with no obvious STEMI  Likely non ST elevation MI   ADRIEN Score: 5     Plan:   STAT EKG and troponin if chest pain, Telemetry for arrhythmias  Monitor Troponin Trend  Lipid panel, HbA1C  Nitroglycerin p.r.n., ASA, BB, ACE-I  Heparin drip  Consult cardiology for further workup and likely catheterization/PCI      Sepsis ([facility])  Assessment & Plan  POA as evidenced by tachycardia, tachypnea  Sepsis source: COVID postiive, CT imaging showing signs of infection    Plan:  Blood clx  Procal trend  Trend WBC Fever Curve  Treat COVID  IV Sepsis fluids limited given pulm edema      COPD without exacerbation (HCC)  Assessment & Plan  Not in exacerbation  Chronic 4L NC  Continue home meds and nebs  Treat COVID as noted    Major depressive disorder, recurrent episode, moderate (HCC)  Assessment & Plan  Continue wellbutrin, vilazodone  Also takes Vraylar; recommend bringing from home as we do not have it on formulary    COVID-19  Assessment & Plan  Lab Results   Component Value Date    SARSCOV2 Positive (A) 02/15/2024    SARSCOV2 Negative 12/12/2023    SARSCOV2  Negative 10/28/2023       Symptoms of COVID-19 SYMPTOMS: shortness of breath and Tachycardia (more than 100 beats per min)  Vaccine status: vaccinated    CT chest w contrast showing Scattered areas of tree-in-bud opacity consistent with endobronchial impaction/infection    Plan:  Remdesevir for 5 days, Dexamethasone for 10 days  Therapeutic Anticoagulation per guidelines  OOB TID, PT and OT eval and treat, Self-proning if able, Incentive spirometry  Trend labs as needed; Inflammatory markers, daily labs      Essential hypertension  Assessment & Plan  Blood Pressure: 121/71      Plan:  Continue home meds; initiate lisinopril as part of NSTEMI management  Monitor blood pressure  PRN IV Labetalol for SBP > 160            VTE Pharmacologic Prophylaxis: VTE Score: 7 High Risk (Score >/= 5) - Pharmacological DVT Prophylaxis Ordered: heparin drip. Sequential Compression Devices Ordered.  Code Status: Level 3 - DNAR and DNI   Discussion with Patient/Family: patient    Anticipated Length of Stay: Patient will be admitted on an inpatient basis with an anticipated length of stay of greater than 2 midnights secondary to NSTEMI., SIRS, respiratory failure    Total Time for Visit, including Counseling / Coordination of Care: 85 minutes Greater than 50% of this total time spent on direct patient counseling and coordination of care.    Chief Complaint:   Chief Complaint   Patient presents with    Shortness of Breath     Patient's son was bring patient home from [facility] rehab, and patient became short of breath.  Patient reports sob over past week at least, with wet sounding cough.       History of Present Illness:  Lauern Quintero is a 78 y.o. female who presents with worsening shortness of breath and increased oxygen demand.    PMHx of COPD, HLD, HTN, CAD, anxiety/depression    Presented with worsening respiratory failure and increasing oxygen demand.  Was notably positive for COVID when tested in the ED.  Shortness of breath  persisted and CT imaging showed presence of tree-in-bud opacities reflecting infection along with mild pulmonary edema.  Troponin was also notably elevated; patient did not have significant chest pain, but did have worsening shortness of breath.  Heparin drip was initiated while in the ED.  Of note, patient did have bruising on the right cheekbone area which patient attributed to recent fall a few weeks prior; did not lose consciousness at that time.    Patient was admitted to inpatient for management of presumed NSTEMI, acute respiratory failure, sepsis, COVID.    Review of Systems:  A 10-point review of systems was obtained.  Pertinent positives and negatives are outlined in the HPI above.  Remainder of review of systems are otherwise negative.     Past Medical and Surgical History:   Past Medical History:   Diagnosis Date    Acute congestive heart failure (HCC) 8/27/2021    Anxiety     Cardiac disease     Chest pain 8/27/2021    Chronic pain disorder     COPD (chronic obstructive pulmonary disease) ([facility])     Depression     Heart disease     Hyperlipidemia     Hypertension     MI (myocardial infarction) ([facility])     MRSA (methicillin resistant Staphylococcus aureus)     Renal disorder     benign kidney tumor       Past Surgical History:   Procedure Laterality Date    APPENDECTOMY      ELBOW BURSA SURGERY Left 02/2018    D/T MRSA INFECTION    SPINAL FUSION      L7 L9       Meds/Allergies:  Prior to Admission medications    Medication Sig Start Date End Date Taking? Authorizing Provider   albuterol (VENTOLIN HFA) 90 mcg/act inhaler Inhale 2 puffs every 6 (six) hours as needed for wheezing 1/7/20   Roderick Garcia PA-C   aspirin (ECOTRIN LOW STRENGTH) 81 mg EC tablet Take 1 tablet (81 mg total) by mouth daily 8/31/21 9/30/21  Lico Larson MD   budesonide-formoterol (SYMBICORT) 160-4.5 mcg/act inhaler Inhale 2 puffs 2 (two) times a day Rinse mouth after use. 12/15/23   Sha Brantley MD   buPROPion  (WELLBUTRIN) 75 mg tablet Take 75 mg by mouth in the morning    Historical Provider, MD   Calcium-Magnesium-Vitamin D (CALCIUM 500 PO) Take 1,000 mg by mouth daily    Historical Provider, MD   cariprazine (Vraylar) 1.5 MG capsule Take 1.5 mg by mouth daily    Historical Provider, MD   carvedilol (COREG) 25 mg tablet Take 25 mg by mouth 2 (two) times a day with meals    Historical Provider, MD   guaiFENesin (MUCINEX) 600 mg 12 hr tablet Take 1 tablet (600 mg total) by mouth 2 (two) times a day 12/23/18   Pam Cope MD   hydrochlorothiazide (HYDRODIURIL) 25 mg tablet Take 1 tablet (25 mg total) by mouth daily 1/7/20   Roderick Garcia PA-C   HYDROcodone-acetaminophen (NORCO) 5-325 mg per tablet Take 1 tablet by mouth every 6 (six) hours as needed for pain    Historical Provider, MD   hydrOXYzine HCL (ATARAX) 25 mg tablet Take 1 tablet (25 mg total) by mouth every 6 (six) hours as needed for anxiety 5/5/23   TAMMIE Chen   ipratropium (ATROVENT) 0.02 % nebulizer solution Take 2.5 mL (0.5 mg total) by nebulization 3 (three) times a day 5/5/23 6/4/23  TAMMIE Chen   lidocaine (LIDODERM) 5 % Apply 2 patches topically over 12 hours daily for 10 days Remove & Discard patch within 12 hours or as directed by MD 11/21/23 12/1/23  Uche Purdy MD   meclizine (ANTIVERT) 25 mg tablet Take 1 tablet (25 mg total) by mouth every 8 (eight) hours as needed for dizziness for up to 7 days 12/15/23 12/22/23  Sha Brantley MD   melatonin 3 mg Take 1 tablet (3 mg total) by mouth daily at bedtime as needed (insomnia) 1/7/20   TAMMIE English   nitroglycerin (NITROSTAT) 0.4 mg SL tablet Place 0.4 mg under the tongue every 5 (five) minutes as needed for chest pain    Historical Provider, MD   pantoprazole (PROTONIX) 40 mg tablet Take 1 tablet (40 mg total) by mouth daily 11/3/23   Domenic YAÑEZ MD   vilazodone (VIIBRYD) 40 mg tablet Take 40 mg by mouth daily with breakfast    Historical Provider,  MD Leblanctinib 80 MG CAPS Take 160 mg by mouth 2 (two) times a day    Historical Provider, MD VALDEZ have reviewed home medications using recent Epic encounter.    Allergies:   Allergies   Allergen Reactions    Sulfa Antibiotics Anaphylaxis    Iron GI Intolerance    Niacin     Statins Other (See Comments)     cramps       Social History:  Marital Status:    Patient Pre-hospital Living Situation: Home  Patient Pre-hospital Level of Mobility: unable to be assessed at time of evaluation  Patient Pre-hospital Diet Restrictions: none  Substance Use History:   Social History     Substance and Sexual Activity   Alcohol Use Never     Social History     Tobacco Use   Smoking Status Former    Current packs/day: 0.00    Average packs/day: 1 pack/day for 60.0 years (60.0 ttl pk-yrs)    Types: Cigarettes    Start date:     Quit date:     Years since quittin.1   Smokeless Tobacco Never   Tobacco Comments    She has close to a 60 pack-year smoking history, most recently has cut down to 4-5 cigarettes daily     Social History     Substance and Sexual Activity   Drug Use No       Family History:  Family History   Problem Relation Age of Onset    Heart disease Mother     Cancer Father        Physical Exam:     Vitals:   Blood Pressure: 117/70 (02/15/24 2115)  Pulse: 100 (02/15/24 2115)  Temperature: 98.5 °F (36.9 °C) (02/15/24 1726)  Temp Source: Oral (02/15/24 1726)  Respirations: 20 (02/15/24 2115)  Weight - Scale: 60.3 kg (132 lb 15 oz) (02/15/24 1859)  SpO2: 97 % (02/15/24 2115)    Physical Exam  Vitals reviewed.   Constitutional:       Appearance: She is well-developed. She is ill-appearing.   HENT:      Head: Normocephalic and atraumatic.     Eyes:      Conjunctiva/sclera: Conjunctivae normal.   Cardiovascular:      Rate and Rhythm: Normal rate and regular rhythm.      Heart sounds: No murmur heard.  Pulmonary:      Effort: Respiratory distress present.      Breath sounds: Rales present.   Abdominal:       Palpations: Abdomen is soft.      Tenderness: There is no abdominal tenderness.   Musculoskeletal:         General: No swelling.      Cervical back: Neck supple.   Skin:     General: Skin is warm and dry.      Capillary Refill: Capillary refill takes less than 2 seconds.   Neurological:      Mental Status: She is alert. Mental status is at baseline.   Psychiatric:         Mood and Affect: Mood normal.         Behavior: Behavior normal.       Additional Data:     Lab Results:  Results from last 7 days   Lab Units 02/15/24  1756   WBC Thousand/uL 6.04   HEMOGLOBIN g/dL 12.8   HEMATOCRIT % 40.1   PLATELETS Thousands/uL 182   NEUTROS PCT % 74   LYMPHS PCT % 18   MONOS PCT % 5   EOS PCT % 3     Results from last 7 days   Lab Units 02/15/24  1756   SODIUM mmol/L 142   POTASSIUM mmol/L 4.0   CHLORIDE mmol/L 107   CO2 mmol/L 25   BUN mg/dL 14   CREATININE mg/dL 0.90   ANION GAP mmol/L 10   CALCIUM mg/dL 8.7   ALBUMIN g/dL 3.9   TOTAL BILIRUBIN mg/dL 0.35   ALK PHOS U/L 70   ALT U/L 3*   AST U/L 17   GLUCOSE RANDOM mg/dL 167*     Results from last 7 days   Lab Units 02/15/24  1908   INR  1.03             Results from last 7 days   Lab Units 02/15/24  2030   LACTIC ACID mmol/L 0.8       Imaging Results Reviewed as noted below  CT chest with contrast   Final Result by Gal Stephenson MD (02/15 1934)      No evidence for pulmonary embolism.      Mild pulmonary edema.      Scattered areas of tree-in-bud opacity consistent with endobronchial impaction/infection.               Workstation performed: [id number]         XR chest 1 view portable    (Results Pending)       CT chest with contrast    Result Date: 2/15/2024  Impression: No evidence for pulmonary embolism. Mild pulmonary edema. Scattered areas of tree-in-bud opacity consistent with endobronchial impaction/infection. Workstation performed: [id number]     No Chest XR results available for this patient.       EKG and Other Studies Reviewed on Admission:   EKG: sinus tachycardia  without obvious STEMI     Recent Labs     02/15/24  2005   VENTRATE 108         ** Please Note: This note has been constructed using a voice recognition system. **

## 2024-02-17 LAB
2HR DELTA HS TROPONIN: -535 NG/L
4HR DELTA HS TROPONIN: -372 NG/L
ALBUMIN SERPL BCP-MCNC: 4.2 G/DL (ref 3.5–5)
ALP SERPL-CCNC: 57 U/L (ref 34–104)
ALT SERPL W P-5'-P-CCNC: 7 U/L (ref 7–52)
ANION GAP SERPL CALCULATED.3IONS-SCNC: 8 MMOL/L
APTT PPP: 52 SECONDS (ref 23–37)
APTT PPP: 61 SECONDS (ref 23–37)
APTT PPP: 79 SECONDS (ref 23–37)
AST SERPL W P-5'-P-CCNC: 27 U/L (ref 13–39)
ATRIAL RATE: 100 BPM
ATRIAL RATE: 108 BPM
ATRIAL RATE: 110 BPM
ATRIAL RATE: 132 BPM
BASOPHILS # BLD AUTO: 0.01 THOUSANDS/ÂΜL (ref 0–0.1)
BASOPHILS NFR BLD AUTO: 0 % (ref 0–1)
BILIRUB SERPL-MCNC: 0.4 MG/DL (ref 0.2–1)
BUN SERPL-MCNC: 24 MG/DL (ref 5–25)
CALCIUM SERPL-MCNC: 9 MG/DL (ref 8.4–10.2)
CARDIAC TROPONIN I PNL SERPL HS: 2049 NG/L
CARDIAC TROPONIN I PNL SERPL HS: 2212 NG/L
CARDIAC TROPONIN I PNL SERPL HS: 2584 NG/L
CARDIAC TROPONIN I PNL SERPL HS: 2861 NG/L (ref 8–18)
CHLORIDE SERPL-SCNC: 106 MMOL/L (ref 96–108)
CO2 SERPL-SCNC: 29 MMOL/L (ref 21–32)
CREAT SERPL-MCNC: 0.88 MG/DL (ref 0.6–1.3)
CRP SERPL QL: 3.8 MG/L
EOSINOPHIL # BLD AUTO: 0 THOUSAND/ÂΜL (ref 0–0.61)
EOSINOPHIL NFR BLD AUTO: 0 % (ref 0–6)
ERYTHROCYTE [DISTWIDTH] IN BLOOD BY AUTOMATED COUNT: 12 % (ref 11.6–15.1)
ERYTHROCYTE [SEDIMENTATION RATE] IN BLOOD: 21 MM/HOUR (ref 0–29)
FERRITIN SERPL-MCNC: 121 NG/ML (ref 11–307)
GFR SERPL CREATININE-BSD FRML MDRD: 63 ML/MIN/1.73SQ M
GLUCOSE SERPL-MCNC: 138 MG/DL (ref 65–140)
HCT VFR BLD AUTO: 32.2 % (ref 34.8–46.1)
HGB BLD-MCNC: 10.5 G/DL (ref 11.5–15.4)
IMM GRANULOCYTES # BLD AUTO: 0.02 THOUSAND/UL (ref 0–0.2)
IMM GRANULOCYTES NFR BLD AUTO: 0 % (ref 0–2)
LYMPHOCYTES # BLD AUTO: 0.84 THOUSANDS/ÂΜL (ref 0.6–4.47)
LYMPHOCYTES NFR BLD AUTO: 13 % (ref 14–44)
MCH RBC QN AUTO: 30.6 PG (ref 26.8–34.3)
MCHC RBC AUTO-ENTMCNC: 32.6 G/DL (ref 31.4–37.4)
MCV RBC AUTO: 94 FL (ref 82–98)
MECA+MECC ISLT/SPM QL: DETECTED
MONOCYTES # BLD AUTO: 0.41 THOUSAND/ÂΜL (ref 0.17–1.22)
MONOCYTES NFR BLD AUTO: 6 % (ref 4–12)
NEUTROPHILS # BLD AUTO: 5.31 THOUSANDS/ÂΜL (ref 1.85–7.62)
NEUTS SEG NFR BLD AUTO: 81 % (ref 43–75)
NRBC BLD AUTO-RTO: 0 /100 WBCS
P AXIS: 76 DEGREES
P AXIS: 76 DEGREES
P AXIS: 77 DEGREES
P AXIS: 80 DEGREES
PLATELET # BLD AUTO: 156 THOUSANDS/UL (ref 149–390)
PMV BLD AUTO: 10.7 FL (ref 8.9–12.7)
POTASSIUM SERPL-SCNC: 3.6 MMOL/L (ref 3.5–5.3)
PR INTERVAL: 136 MS
PR INTERVAL: 146 MS
PR INTERVAL: 146 MS
PR INTERVAL: 154 MS
PROT SERPL-MCNC: 6.1 G/DL (ref 6.4–8.4)
QRS AXIS: 91 DEGREES
QRS AXIS: 91 DEGREES
QRS AXIS: 93 DEGREES
QRS AXIS: 96 DEGREES
QRSD INTERVAL: 78 MS
QRSD INTERVAL: 90 MS
QRSD INTERVAL: 90 MS
QRSD INTERVAL: 92 MS
QT INTERVAL: 310 MS
QT INTERVAL: 368 MS
QT INTERVAL: 378 MS
QT INTERVAL: 422 MS
QTC INTERVAL: 459 MS
QTC INTERVAL: 498 MS
QTC INTERVAL: 506 MS
QTC INTERVAL: 544 MS
RBC # BLD AUTO: 3.43 MILLION/UL (ref 3.81–5.12)
S EPIDERMIDIS DNA BLD POS QL NAA+NON-PRB: DETECTED
SODIUM SERPL-SCNC: 143 MMOL/L (ref 135–147)
T WAVE AXIS: 79 DEGREES
T WAVE AXIS: 82 DEGREES
T WAVE AXIS: 83 DEGREES
T WAVE AXIS: 85 DEGREES
VENTRICULAR RATE: 100 BPM
VENTRICULAR RATE: 108 BPM
VENTRICULAR RATE: 110 BPM
VENTRICULAR RATE: 132 BPM
WBC # BLD AUTO: 6.59 THOUSAND/UL (ref 4.31–10.16)

## 2024-02-17 PROCEDURE — 94640 AIRWAY INHALATION TREATMENT: CPT

## 2024-02-17 PROCEDURE — 94760 N-INVAS EAR/PLS OXIMETRY 1: CPT

## 2024-02-17 PROCEDURE — 93010 ELECTROCARDIOGRAM REPORT: CPT | Performed by: INTERNAL MEDICINE

## 2024-02-17 PROCEDURE — 84484 ASSAY OF TROPONIN QUANT: CPT | Performed by: PHYSICIAN ASSISTANT

## 2024-02-17 PROCEDURE — 99233 SBSQ HOSP IP/OBS HIGH 50: CPT | Performed by: INTERNAL MEDICINE

## 2024-02-17 PROCEDURE — 85730 THROMBOPLASTIN TIME PARTIAL: CPT | Performed by: INTERNAL MEDICINE

## 2024-02-17 PROCEDURE — 80053 COMPREHEN METABOLIC PANEL: CPT | Performed by: INTERNAL MEDICINE

## 2024-02-17 PROCEDURE — 85025 COMPLETE CBC W/AUTO DIFF WBC: CPT | Performed by: INTERNAL MEDICINE

## 2024-02-17 PROCEDURE — 94664 DEMO&/EVAL PT USE INHALER: CPT

## 2024-02-17 RX ORDER — LANOLIN ALCOHOL/MO/W.PET/CERES
3 CREAM (GRAM) TOPICAL
Status: DISCONTINUED | OUTPATIENT
Start: 2024-02-17 | End: 2024-03-07 | Stop reason: HOSPADM

## 2024-02-17 RX ADMIN — HEPARIN SODIUM 1650 UNITS: 1000 INJECTION INTRAVENOUS; SUBCUTANEOUS at 06:24

## 2024-02-17 RX ADMIN — CARVEDILOL 25 MG: 12.5 TABLET, FILM COATED ORAL at 17:39

## 2024-02-17 RX ADMIN — ALBUTEROL SULFATE 2 PUFF: 90 AEROSOL, METERED RESPIRATORY (INHALATION) at 18:58

## 2024-02-17 RX ADMIN — IPRATROPIUM BROMIDE 0.5 MG: 0.5 SOLUTION RESPIRATORY (INHALATION) at 07:22

## 2024-02-17 RX ADMIN — CARVEDILOL 25 MG: 12.5 TABLET, FILM COATED ORAL at 08:24

## 2024-02-17 RX ADMIN — REMDESIVIR 100 MG: 100 INJECTION, POWDER, LYOPHILIZED, FOR SOLUTION INTRAVENOUS at 20:39

## 2024-02-17 RX ADMIN — BUDESONIDE AND FORMOTEROL FUMARATE DIHYDRATE 2 PUFF: 160; 4.5 AEROSOL RESPIRATORY (INHALATION) at 08:24

## 2024-02-17 RX ADMIN — NITROGLYCERIN 0.4 MG: 0.4 TABLET SUBLINGUAL at 01:32

## 2024-02-17 RX ADMIN — BUPROPION HYDROCHLORIDE 75 MG: 75 TABLET, FILM COATED ORAL at 08:24

## 2024-02-17 RX ADMIN — DEXAMETHASONE SODIUM PHOSPHATE 6 MG: 10 INJECTION, SOLUTION INTRAMUSCULAR; INTRAVENOUS at 23:16

## 2024-02-17 RX ADMIN — ZANUBRUTINIB 160 MG: 80 CAPSULE, GELATIN COATED ORAL at 18:38

## 2024-02-17 RX ADMIN — VILAZODONE HYDROCHLORIDE 40 MG: 20 TABLET ORAL at 08:25

## 2024-02-17 RX ADMIN — ALBUTEROL SULFATE 2.5 MG: 2.5 SOLUTION RESPIRATORY (INHALATION) at 22:55

## 2024-02-17 RX ADMIN — LEVALBUTEROL HYDROCHLORIDE 1.25 MG: 1.25 SOLUTION RESPIRATORY (INHALATION) at 07:22

## 2024-02-17 RX ADMIN — PANTOPRAZOLE SODIUM 40 MG: 40 TABLET, DELAYED RELEASE ORAL at 08:24

## 2024-02-17 RX ADMIN — MIDODRINE HYDROCHLORIDE 10 MG: 5 TABLET ORAL at 17:39

## 2024-02-17 RX ADMIN — BARICITINIB 2 MG: 2 TABLET, FILM COATED ORAL at 08:24

## 2024-02-17 RX ADMIN — MIDODRINE HYDROCHLORIDE 10 MG: 5 TABLET ORAL at 08:31

## 2024-02-17 RX ADMIN — BUDESONIDE AND FORMOTEROL FUMARATE DIHYDRATE 2 PUFF: 160; 4.5 AEROSOL RESPIRATORY (INHALATION) at 17:40

## 2024-02-17 RX ADMIN — HEPARIN SODIUM 11.1 UNITS/KG/HR: 10000 INJECTION, SOLUTION INTRAVENOUS at 16:25

## 2024-02-17 RX ADMIN — DOCUSATE SODIUM 100 MG: 100 CAPSULE, LIQUID FILLED ORAL at 17:39

## 2024-02-17 RX ADMIN — GUAIFENESIN AND DEXTROMETHORPHAN 10 ML: 100; 10 SYRUP ORAL at 00:31

## 2024-02-17 RX ADMIN — DOCUSATE SODIUM 100 MG: 100 CAPSULE, LIQUID FILLED ORAL at 08:24

## 2024-02-17 RX ADMIN — MIDODRINE HYDROCHLORIDE 10 MG: 5 TABLET ORAL at 12:05

## 2024-02-17 RX ADMIN — Medication 3 MG: at 23:10

## 2024-02-17 RX ADMIN — ASPIRIN 81 MG: 81 TABLET, COATED ORAL at 08:24

## 2024-02-17 RX ADMIN — NITROGLYCERIN 0.4 MG: 0.4 TABLET SUBLINGUAL at 01:04

## 2024-02-17 RX ADMIN — LIDOCAINE 5% 2 PATCH: 700 PATCH TOPICAL at 00:31

## 2024-02-17 RX ADMIN — PRAVASTATIN SODIUM 80 MG: 80 TABLET ORAL at 17:39

## 2024-02-17 RX ADMIN — ACETAMINOPHEN 975 MG: 325 TABLET, FILM COATED ORAL at 00:31

## 2024-02-17 NOTE — RESPIRATORY THERAPY NOTE
02/16/24 1949   Respiratory Protocol   Protocol Initiated? No   Protocol Selection Respiratory   Language Barrier? No   Medical & Social History Reviewed? Yes   Diagnostic Studies Reviewed? Yes   Physical Assessment Performed? Yes   Home Devices/Therapy Home O2   Respiratory Plan Mild Distress pathway   Respiratory Assessment   Assessment Type Pre-treatment   General Appearance Awake   Respiratory Pattern Dyspnea with exertion   Chest Assessment Chest expansion symmetrical   Bilateral Breath Sounds Diminished;Crackles   Resp Comments patient admitted with NSTEMI, pt is covid positive and has a pulmonary history for which she takes scheduled txs at home, patient is unablet to appropriately use MDI's therefore eventhough she is covid positive she will require nebulized txs. Patient's MFNC was titrated to 10L at this time.   O2 Device MFNC

## 2024-02-17 NOTE — ASSESSMENT & PLAN NOTE
Lab Results   Component Value Date    SARSCOV2 Positive (A) 02/15/2024    SARSCOV2 Negative 12/12/2023    SARSCOV2 Negative 10/28/2023       Symptoms of COVID-19 SYMPTOMS: shortness of breath and Tachycardia (more than 100 beats per min)  Vaccine status: vaccinated  The patient does have acute hypoxemic respiratory failure given the same    CT chest w contrast showing Scattered areas of tree-in-bud opacity consistent with endobronchial impaction/infection      Continue remdesevir for 5 days, Dexamethasone for 10 days  Continue baricitinib given worsening oxygen requirement on 2/16  Check inflammatory markers  Therapeutic Anticoagulation per guidelines -the patient is on heparin infusion given her elevated troponins as well  Continue to monitor and attempt wean oxygen  Patient requires continued inpatient stay

## 2024-02-17 NOTE — RESPIRATORY THERAPY NOTE
02/17/24 0211   Respiratory Assessment   Resp Comments MFNC titrated to 8L   Oxygen Therapy/Pulse Ox   O2 Device Mid flow nasal cannula   O2 Therapy Oxygen humidified   Nasal Cannula O2 Flow Rate (L/min) (S)  8 L/min   Calculated FIO2 (%) - Nasal Cannula 52   SpO2 95 %   SpO2 Activity At Rest   $ Pulse Oximetry Spot Check Charge Completed

## 2024-02-17 NOTE — RESPIRATORY THERAPY NOTE
02/17/24 1128   Respiratory Assessment   Assessment Type Assess only   General Appearance Awake;Alert   Respiratory Pattern Dyspnea with exertion   Chest Assessment Chest expansion symmetrical   Bilateral Breath Sounds Diminished   Resp Comments pt transitioned to 5L NC   O2 Device mfnc   Oxygen Therapy/Pulse Ox   O2 Device Nasal cannula   O2 Therapy Oxygen   Nasal Cannula O2 Flow Rate (L/min) 5 L/min   Calculated FIO2 (%) - Nasal Cannula 40   SpO2 95 %   SpO2 Activity At Rest   $ Pulse Oximetry Spot Check Charge Completed

## 2024-02-17 NOTE — RESPIRATORY THERAPY NOTE
RT Protocol Note  Lauren Quintero 78 y.o. female MRN: [id number]  Unit/Bed#: -01 Encounter: [id number]    Assessment    Principal Problem:    NSTEMI (non-ST elevated myocardial infarction) ([facility])  Active Problems:    Essential hypertension    Acute on chronic respiratory failure with hypoxia ([facility])    Sepsis ([facility])    Major depressive disorder, recurrent episode, moderate ([facility])    COPD without exacerbation ([facility])    COVID-19      Home Pulmonary Medications:     02/17/24 0724   Respiratory Protocol   Protocol Initiated? No   Protocol Selection Respiratory   Language Barrier? No   Medical & Social History Reviewed? Yes   Diagnostic Studies Reviewed? Yes   Physical Assessment Performed? Yes   Home Devices/Therapy Home O2   Respiratory Plan Home Bronchodilator Patient pathway   Respiratory Assessment   Assessment Type Pre-treatment   General Appearance Alert;Awake   Respiratory Pattern Dyspnea with exertion   Chest Assessment Chest expansion symmetrical   Bilateral Breath Sounds Diminished   Resp Comments continue home regimen of nebs, lowered O2 to 6L mfnc   O2 Device mfnc   Additional Assessments   SpO2 100 %       Home Devices/Therapy: Home O2    Past Medical History:   Diagnosis Date    Acute congestive heart failure (HCC) 8/27/2021    Anxiety     Cardiac disease     Chest pain 8/27/2021    Chronic pain disorder     COPD (chronic obstructive pulmonary disease) ([facility])     Depression     Heart disease     Hyperlipidemia     Hypertension     MI (myocardial infarction) ([facility])     MRSA (methicillin resistant Staphylococcus aureus)     Renal disorder     benign kidney tumor     Social History     Socioeconomic History    Marital status:      Spouse name: None    Number of children: 3    Years of education: 13    Highest education level: High school graduate   Occupational History    Occupation:      Employer: [employer]     Comment: retired    Tobacco Use    Smoking status: Former      Current packs/day: 0.00     Average packs/day: 1 pack/day for 60.0 years (60.0 ttl pk-yrs)     Types: Cigarettes     Start date:      Quit date: 2016     Years since quittin.1    Smokeless tobacco: Never    Tobacco comments:     She has close to a 60 pack-year smoking history, most recently has cut down to 4-5 cigarettes daily   Vaping Use    Vaping status: Never Used   Substance and Sexual Activity    Alcohol use: Never    Drug use: No    Sexual activity: Not Currently   Other Topics Concern    None   Social History Narrative    None     Social Determinants of Health     Financial Resource Strain: Patient Declined (2024)    Received from [facility]    Overall Financial Resource Strain (CARDIA)     Difficulty of Paying Living Expenses: Patient declined   Food Insecurity: No Food Insecurity (2024)    Hunger Vital Sign     Worried About Running Out of Food in the Last Year: Never true     Ran Out of Food in the Last Year: Never true   Transportation Needs: No Transportation Needs (2024)    PRAPARE - Transportation     Lack of Transportation (Medical): No     Lack of Transportation (Non-Medical): No   Physical Activity: Patient Declined (2024)    Received from [facility]    Exercise Vital Sign     Days of Exercise per Week: Patient declined     Minutes of Exercise per Session: Patient declined   Stress: Patient Declined (2024)    Received from [facility]    [employer] [place] [employer] - Occupational Stress Questionnaire     Feeling of Stress : Patient declined   Social Connections: Patient Declined (2024)    Received from [facility]    Social Connection and Isolation Panel [NHANES]     Frequency of Communication with Friends and Family: Patient declined     Frequency of Social Gatherings with Friends and Family: Patient declined     Attends Shinto Services: Patient declined     Active Member of Clubs or Organizations: Patient declined     Attends  "Club or Organization Meetings: Patient declined     Marital Status: Patient declined   Intimate Partner Violence: Patient Declined (1/6/2024)    Received from [facility]    Humiliation, Afraid, Rape, and Kick questionnaire     Fear of Current or Ex-Partner: Patient declined     Emotionally Abused: Patient declined     Physically Abused: Patient declined     Sexually Abused: Patient declined   Housing Stability: Low Risk  (2/17/2024)    Housing Stability Vital Sign     Unable to Pay for Housing in the Last Year: No     Number of Places Lived in the Last Year: 2     Unstable Housing in the Last Year: No       Subjective    Subjective Data: awake and alert    Objective    Physical Exam:   Assessment Type: Pre-treatment  General Appearance: Alert, Awake  Respiratory Pattern: Dyspnea with exertion  Chest Assessment: Chest expansion symmetrical  Bilateral Breath Sounds: Diminished  O2 Device: mfnc    Vitals:  Blood pressure 115/70, pulse 79, temperature 97.8 °F (36.6 °C), temperature source Oral, resp. rate 20, height 5' 8\" (1.727 m), weight 59.9 kg (132 lb), SpO2 100%, not currently breastfeeding.          Imaging and other studies: I have personally reviewed pertinent reports.      O2 Device: mfnc     Plan    Respiratory Plan: Home Bronchodilator Patient pathway        Resp Comments: continue home regimen of nebs, lowered O2 to 6L mfnc   "

## 2024-02-17 NOTE — ASSESSMENT & PLAN NOTE
Lab Results   Component Value Date    HSTNI0 2,584 (H) 02/17/2024    HSTNI2 2,049 (H) 02/17/2024    HSTNID2 -535 02/17/2024      CT chest showing no evidence of pulmonary embolism, did note mild pulmonary edema  No chest pain present, but significant SOB, anginal equivalent, sudden onset, fairly elevated troponins  EKG: Sinus Tachycardia with no obvious STEMI  Suspect a true type I NSTEMI  ADRIEN Score: 5    The patient's presentation with shortness of breath was somewhat sudden according to the son, he was driving her home from the rehab facility if she got acutely short of breath and hypoxemic again.  Certainly it is possible that the COVID infection may have triggered some degree of underlying CAD leading to an NSTEMI.  Continue heparin infusion for now  Cardiology follow-up appreciated - plan for cardiac cath tomorrow 02/19.

## 2024-02-17 NOTE — ASSESSMENT & PLAN NOTE
Continue wellbutrin, vilazodone  Also takes Vraylar; recommend bringing from home as we do not have it on formulary - patient now has it

## 2024-02-17 NOTE — ASSESSMENT & PLAN NOTE
POA as evidenced by tachycardia, tachypnea  Sepsis source: COVID postiive, CT imaging showing signs of infection    Continue COVID treatment with baricitinib, dexamethasone, remdesivir  Continue to attempt wean oxygen  Prognosis overall guarded given age, comorbidities, poor functional status at baseline, discussed with patient and son in detail, both endorse DNR/DNI status.  Continue current treatment

## 2024-02-17 NOTE — QUICK NOTE
Notified by RN patient complaining of chest pain, vital signs stable except BP slightly soft at 97/60, MAP 73.  Patient now status post 2 nitro with some relief in chest pain now a 2 out of 10 pain down from severe pain.    However, there are changes in EKG tonight revealing new T wave inversions in V2 through V6 and also abnormal ST in V2 and V3.  Troponin downtrending to 2861 down from 4751 yesterday    Due to EKG changes did review with on-call cardiologist who reports at this time it is okay to continue as needed nitro for chest pain and to make patient n.p.o. for most likely cath this morning.    Will continue to trend troponins and EKG, continue telemetry.  Patient made n.p.o. for possible catheterization this morning.  Continue to closely monitor vitals

## 2024-02-17 NOTE — PLAN OF CARE
Problem: PAIN - ADULT  Goal: Verbalizes/displays adequate comfort level or baseline comfort level  Description: Interventions:  - Encourage patient to monitor pain and request assistance  - Assess pain using appropriate pain scale  - Administer analgesics based on type and severity of pain and evaluate response  - Implement non-pharmacological measures as appropriate and evaluate response  - Consider cultural and social influences on pain and pain management  - Notify physician/advanced practitioner if interventions unsuccessful or patient reports new pain  Outcome: Progressing     Problem: Knowledge Deficit  Goal: Patient/family/caregiver demonstrates understanding of disease process, treatment plan, medications, and discharge instructions  Description: Complete learning assessment and assess knowledge base.  Interventions:  - Provide teaching at level of understanding  - Provide teaching via preferred learning methods  Outcome: Progressing     Problem: MOBILITY - ADULT  Goal: Maintain or return to baseline ADL function  Description: INTERVENTIONS:  -  Assess patient's ability to carry out ADLs; assess patient's baseline for ADL function and identify physical deficits which impact ability to perform ADLs (bathing, care of mouth/teeth, toileting, grooming, dressing, etc.)  - Assess/evaluate cause of self-care deficits   - Assess range of motion  - Assess patient's mobility; develop plan if impaired  - Assess patient's need for assistive devices and provide as appropriate  - Encourage maximum independence but intervene and supervise when necessary  - Involve family in performance of ADLs  - Assess for home care needs following discharge   - Consider OT consult to assist with ADL evaluation and planning for discharge  - Provide patient education as appropriate  Outcome: Progressing     Problem: RESPIRATORY - ADULT  Goal: Achieves optimal ventilation and oxygenation  Description: INTERVENTIONS:  - Assess for changes  in respiratory status  - Assess for changes in mentation and behavior  - Position to facilitate oxygenation and minimize respiratory effort  - Oxygen administered by appropriate delivery if ordered  - Initiate smoking cessation education as indicated  - Encourage broncho-pulmonary hygiene including cough, deep breathe, Incentive Spirometry  - Assess the need for suctioning and aspirate as needed  - Assess and instruct to report SOB or any respiratory difficulty  - Respiratory Therapy support as indicated  2/16/2024 2320 by Gillian Gonzalez RN  Outcome: Progressing  2/16/2024 2319 by Gillian Gonzalez, RN  Outcome: Progressing

## 2024-02-17 NOTE — ASSESSMENT & PLAN NOTE
Continue home meds; initiate lisinopril as part of NSTEMI management  Continue carvedilol  PRN IV Labetalol for SBP > 160  Monitor blood pressure

## 2024-02-17 NOTE — PROGRESS NOTES
"Cardiology Progress Note - Lauren Quintero 78 y.o. female MRN: [id number]    Unit/Bed#: -01 Encounter: [id number]      Assessment/Plan:  1.  Sepsis, COVID-19 infection  2.  Acute on chronic hypoxic respiratory failure - baseline 4L NC  3.  Chest pain  Troponins elevated with peak of 4751.  Patient developed EKG changes overnight suggestive of Wellens syndrome.  TTE pending.  Continue heparin gtt.  Discussed risk and benefits of further ischemic evaluation with cardiac catheterization on Monday, patient agreeable.  Continue heparin gtt.     4.  Elevated troponin  Troponins with peak of 4751, see plan above     5.  CAD s/p DANIELLA to mRCA (9/2018)  Continue ASA, pravastatin, and Coreg.     6.  Chronic HFpEF  Euvolemic on exam.  Continue to monitor off HCTZ due to soft BPs.  Strict I/O's and daily weights; recommend sodium restriction.     7.  Hypertension  BP running soft.  Continue Coreg with midodrine.  Lisinopril and HCTZ held upon admission.      8.  Hyperlipidemia  Continue pravastatin     9.  CKD 2-3  10.  COPD  11.  IFG        Subjective:   Patient seen and examined.  Patient did experience some chest pain and EKG changes overnight.  Reportedly feels slightly better today.  Denies any additional complaints at this time.  All questions were answered.    Objective:     Vitals: Blood pressure 115/70, pulse 79, temperature 97.8 °F (36.6 °C), temperature source Oral, resp. rate 20, height 5' 8\" (1.727 m), weight 59.9 kg (132 lb), SpO2 95%, not currently breastfeeding., Body mass index is 20.07 kg/m².,   Orthostatic Blood Pressures      Flowsheet Row Most Recent Value   Blood Pressure 115/70 filed at 02/17/2024 0700   Patient Position - Orthostatic VS Lying filed at 02/17/2024 0700            No intake or output data in the 24 hours ending 02/17/24 1515      Physical Exam:  GEN: Alert and oriented x 3, in no acute distress.  Ill appearing and well nourished.  NC in place.  HEENT: Sclera anicteric, conjunctivae pink, " mucous membranes moist. Oropharynx clear.   NECK: Supple, no carotid bruits, no significant JVD. Trachea midline, no thyromegaly.   HEART: Regular rhythm, normal S1 and S2, no murmurs, clicks, gallops or rubs. PMI nondisplaced, no thrills.   LUNGS: Clear to auscultation bilaterally; no wheezes, rales, or rhonchi. No increased work of breathing or signs of respiratory distress.   ABDOMEN: Soft, nontender, nondistended, normoactive bowel sounds.   EXTREMITIES: Skin warm and well perfused, no clubbing, cyanosis, or edema.  NEURO: No focal findings. Normal speech. Mood and affect normal.   SKIN: Normal without suspicious lesions on exposed skin.        Medications:      Current Facility-Administered Medications:     acetaminophen (TYLENOL) tablet 975 mg, 975 mg, Oral, Q8H PRN, Stephanie Day PA-C, 975 mg at 02/17/24 0031    albuterol (PROVENTIL HFA,VENTOLIN HFA) inhaler 2 puff, 2 puff, Inhalation, Q4H PRN, Jose Shelton, DO, 2 puff at 02/16/24 0409    albuterol inhalation solution 2.5 mg, 2.5 mg, Nebulization, Q4H PRN, TAMIME Christensen, 2.5 mg at 02/16/24 0502    aluminum-magnesium hydroxide-simethicone (MAALOX) oral suspension 30 mL, 30 mL, Oral, Q6H PRN, Jose Shelton, DO    aspirin (ECOTRIN LOW STRENGTH) EC tablet 81 mg, 81 mg, Oral, Daily, Jose Mcelroywaran, DO, 81 mg at 02/17/24 0824    baricitinib (OLUMIANT) tablet 2 mg, 2 mg, Oral, Q24H, Kenny Stone MD, 2 mg at 02/17/24 0824    budesonide-formoterol (SYMBICORT) 160-4.5 mcg/act inhaler 2 puff, 2 puff, Inhalation, BID, Jose Shelton, DO, 2 puff at 02/17/24 0824    buPROPion (WELLBUTRIN) tablet 75 mg, 75 mg, Oral, Daily, Jose Shelton DO, 75 mg at 02/17/24 0824    carvedilol (COREG) tablet 25 mg, 25 mg, Oral, BID With Meals, Jose Shelton DO, 25 mg at 02/17/24 0824    dexamethasone (PF) (DECADRON) injection 6 mg, 6 mg, Intravenous, Q24H, Jose Shelton DO, 6 mg at 02/16/24 2048    dextromethorphan-guaiFENesin  (ROBITUSSIN DM) oral syrup 10 mL, 10 mL, Oral, Q6H PRN, Stephanie Day PA-C, 10 mL at 02/17/24 0031    docusate sodium (COLACE) capsule 100 mg, 100 mg, Oral, BID, Jose Parameswaran, DO, 100 mg at 02/17/24 0824    heparin (porcine) 25,000 units in 0.45% NaCl 250 mL infusion (premix), 3-20 Units/kg/hr (Order-Specific), Intravenous, Titrated, Jose Parameswaran, DO, Last Rate: 6.1 mL/hr at 02/17/24 0621, 11 Units/kg/hr at 02/17/24 0621    heparin (porcine) injection 1,650 Units, 1,650 Units, Intravenous, Q6H PRN, Jose Parameswaran, DO, 1,650 Units at 02/17/24 0624    heparin (porcine) injection 3,300 Units, 3,300 Units, Intravenous, Q6H PRN, Jose Parameswaran, DO    ipratropium (ATROVENT) 0.02 % inhalation solution 0.5 mg, 0.5 mg, Nebulization, BID, Jose Parameswaran, DO, 0.5 mg at 02/17/24 0722    labetalol (NORMODYNE) injection 10 mg, 10 mg, Intravenous, Q6H PRN, Jose Parameswaran, DO    levalbuterol (XOPENEX) inhalation solution 1.25 mg, 1.25 mg, Nebulization, BID, Jose Parameswaran, DO, 1.25 mg at 02/17/24 0722    lidocaine (LIDODERM) 5 % patch 2 patch, 2 patch, Topical, Daily PRN, Stephanie Day PA-C, 2 patch at 02/17/24 0031    midodrine (PROAMATINE) tablet 10 mg, 10 mg, Oral, TID AC, Kenny Stone MD, 10 mg at 02/17/24 1205    nitroglycerin (NITROSTAT) SL tablet 0.4 mg, 0.4 mg, Sublingual, Q5 Min PRN, Jose Parameswaran, DO, 0.4 mg at 02/17/24 0132    ondansetron (ZOFRAN) injection 4 mg, 4 mg, Intravenous, Q6H PRN, Jose Shelton DO    pantoprazole (PROTONIX) EC tablet 40 mg, 40 mg, Oral, Daily, Jose Shelton DO, 40 mg at 02/17/24 0824    pravastatin (PRAVACHOL) tablet 80 mg, 80 mg, Oral, QPM, Jose Shelton DO, 80 mg at 02/16/24 1721    [COMPLETED] remdesivir (Veklury) 200 mg in sodium chloride 0.9 % 290 mL IVPB, 200 mg, Intravenous, Q24H, Stopped at 02/15/24 2224 **FOLLOWED BY** remdesivir (Veklury) 100 mg in sodium chloride 0.9 % 270 mL IVPB, 100 mg, Intravenous, Q24H, [facility]  "DO Nikita, 100 mg at 02/16/24 2229    vilazodone (VIIBRYD) tablet 40 mg, 40 mg, Oral, Daily With Breakfast, Jose DO Nikita, 40 mg at 02/17/24 0825     Labs & Results:     Results from last 7 days   Lab Units 02/17/24  0910 02/17/24  0656 02/17/24  0444 02/16/24  1309 02/16/24  1139 02/16/24  0921 02/15/24  2154 02/15/24  1956 02/15/24  1756   HS TNI 0HR ng/L  --   --  2,584*  --   --  3,874*  --   --  1,440*   HS TNI 2HR ng/L  --  2,049*  --   --  4,621*  --   --  3,047*  --    HSTNI D2 ng/L  --  -535  --   --  747*  --   --  1,607*  --    HS TNI 4HR ng/L 2,212*  --   --  4,751*  --   --  3,138*  --   --    HSTNI D4 ng/L -372  --   --  877*  --   --  1,698*  --   --      Results from last 7 days   Lab Units 02/17/24 0444 02/16/24  0521 02/15/24  1756   WBC Thousand/uL 6.59 9.20 6.04   HEMOGLOBIN g/dL 10.5* 13.4 12.8   HEMATOCRIT % 32.2* 42.1 40.1   PLATELETS Thousands/uL 156 302 182     Results from last 7 days   Lab Units 02/16/24  0521   TRIGLYCERIDES mg/dL 143   HDL mg/dL 55     Results from last 7 days   Lab Units 02/17/24 0444 02/16/24  0521 02/15/24  1756   POTASSIUM mmol/L 3.6 4.2 4.0   CHLORIDE mmol/L 106 102 107   CO2 mmol/L 29 26 25   BUN mg/dL 24 15 14   CREATININE mg/dL 0.88 0.97 0.90   CALCIUM mg/dL 9.0 9.2 8.7   ALK PHOS U/L 57 79 70   ALT U/L 7 5* 3*   AST U/L 27 26 17     Results from last 7 days   Lab Units 02/17/24  1259 02/17/24  0444 02/16/24  1644 02/16/24  0206 02/15/24  1908   INR   --   --   --   --  1.03   PTT seconds 79* 52* 75*   < > 30    < > = values in this interval not displayed.     Results from last 7 days   Lab Units 02/16/24  0521   MAGNESIUM mg/dL 1.7*       Vitals: Blood pressure 115/70, pulse 79, temperature 97.8 °F (36.6 °C), temperature source Oral, resp. rate 20, height 5' 8\" (1.727 m), weight 59.9 kg (132 lb), SpO2 95%, not currently breastfeeding., Body mass index is 20.07 kg/m².,   Orthostatic Blood Pressures      Flowsheet Row Most Recent Value   Blood " Pressure 115/70 filed at 2024 0700   Patient Position - Orthostatic VS Lying filed at 2024 0700            Systolic (24hrs), Av , Min:92 , Max:122     Diastolic (24hrs), Av, Min:55, Max:75      No intake or output data in the 24 hours ending 24 1515    Invasive Devices       Peripheral Intravenous Line  Duration             Peripheral IV 02/15/24 Dorsal (posterior);Left Hand 1 day    Peripheral IV 02/15/24 Right Antecubital 1 day                    Telemetry:  Telemetry Orders (From admission, onward)               24 Hour Telemetry Monitoring  Continuous x 24 Hours (Telem)        Question:  Reason for 24 Hour Telemetry  Answer:  PCI/EP study (including pacer and ICD implementation), Cardiac surgery, MI, abnormal cardiac cath, and chest pain- rule out MI                       BP Readings from Last 3 Encounters:   24 115/70   12/15/23 162/79   23 153/77      Wt Readings from Last 3 Encounters:   24 59.9 kg (132 lb)   23 56.7 kg (125 lb)   23 55.2 kg (121 lb 11.1 oz)

## 2024-02-17 NOTE — ASSESSMENT & PLAN NOTE
Currently SpO2: 97 % on Nasal Cannula O2 Flow Rate (L/min): 6 L/min    At baseline, uses 4L NC  CT Imaging showing mild pulm edema, scattered areas of tree-in-bud opacity    Continue to treat underlying NSTEMI, COVID, mild pulm edema  Wean O2 as able

## 2024-02-17 NOTE — PROGRESS NOTES
[facility]  Progress Note  Name: Lauren Quintero I  MRN: [id number]  Unit/Bed#: -01 I Date of Admission: 2/15/2024   Date of Service: 2/17/2024 I Hospital Day: 2    Assessment/Plan   COVID-19  Assessment & Plan  Lab Results   Component Value Date    SARSCOV2 Positive (A) 02/15/2024    SARSCOV2 Negative 12/12/2023    SARSCOV2 Negative 10/28/2023       Symptoms of COVID-19 SYMPTOMS: shortness of breath and Tachycardia (more than 100 beats per min)  Vaccine status: vaccinated  The patient does have acute hypoxemic respiratory failure given the same    CT chest w contrast showing Scattered areas of tree-in-bud opacity consistent with endobronchial impaction/infection      Continue remdesevir for 5 days, Dexamethasone for 10 days  Continue baricitinib given worsening oxygen requirement on 2/16  Check inflammatory markers  Therapeutic Anticoagulation per guidelines -the patient is on heparin infusion given her elevated troponins as well  Continue to monitor and attempt wean oxygen    * NSTEMI (non-ST elevated myocardial infarction) (HCC)  Assessment & Plan  Lab Results   Component Value Date    HSTNI0 2,584 (H) 02/17/2024    HSTNI2 2,049 (H) 02/17/2024    HSTNID2 -535 02/17/2024      CT chest showing no evidence of pulmonary embolism, did note mild pulmonary edema  No chest pain present, but significant SOB, anginal equivalent, sudden onset, fairly elevated troponins  EKG: Sinus Tachycardia with no obvious STEMI  Suspect a true type I NSTEMI  ADRIEN Score: 5    The patient's presentation with shortness of breath was somewhat sudden according to the son, he was driving her home from the rehab facility if she got acutely short of breath and hypoxemic again.  Certainly it is possible that the COVID infection may have triggered some degree of underlying CAD leading to an NSTEMI.  Continue heparin infusion  Cardiology follow-up appreciated - patient NPO in case cardiac cath needed today      COPD  without exacerbation (HCC)  Assessment & Plan  Not wheezing but SOB due to COVID  Continue steroids    Major depressive disorder, recurrent episode, moderate (HCC)  Assessment & Plan  Continue wellbutrin, vilazodone  Also takes Vraylar; recommend bringing from home as we do not have it on formulary    Sepsis (HCC)  Assessment & Plan  POA as evidenced by tachycardia, tachypnea  Sepsis source: COVID postiive, CT imaging showing signs of infection    Continue COVID treatment with baricitinib, dexamethasone, remdesivir  Continue to attempt wean oxygen  Prognosis overall guarded given age, comorbidities, poor functional status at baseline, discussed with patient and son in detail, both endorse DNR/DNI status.  Continue current treatment      Acute on chronic respiratory failure with hypoxia (HCC)  Assessment & Plan  Currently SpO2: 97 % on Nasal Cannula O2 Flow Rate (L/min): 6 L/min    At baseline, uses 4L NC  CT Imaging showing mild pulm edema, scattered areas of tree-in-bud opacity    Continue to treat underlying NSTEMI, COVID, mild pulm edema  Wean O2 as able      Essential hypertension  Assessment & Plan    Continue home meds; initiate lisinopril as part of NSTEMI management  Continue carvedilol  PRN IV Labetalol for SBP > 160  Monitor blood pressure                 VTE Pharmacologic Prophylaxis:   Pharmacologic: Heparin Drip  Mechanical VTE Prophylaxis in Place: Yes    Patient Centered Rounds: I have performed bedside rounds with nursing staff today.    Discussions with Specialists or Other Care Team Provider: Discussed with care management team    Education and Discussions with Family / Patient:       Time Spent for Care: 45 minutes.  More than 50% of total time spent on counseling and coordination of care as described above.    Current Length of Stay: 2 day(s)    Current Patient Status: Inpatient   Certification Statement: The patient will continue to require additional inpatient hospital stay due to need for IV  treatments    Discharge Plan:       Code Status: Level 3 - DNAR and DNI      Subjective:     Patient evaluated this morning.  Yesterday she did have some chest pain overnight.  Today she feels slightly better after medication.  Oxygen level steady currently requiring still 6 to 8 L.    Objective:     Vitals:   Temp (24hrs), Av.8 °F (36.6 °C), Min:97.8 °F (36.6 °C), Max:97.8 °F (36.6 °C)    Temp:  [97.8 °F (36.6 °C)] 97.8 °F (36.6 °C)  HR:  [78-96] 79  Resp:  [16-26] 20  BP: ()/(55-75) 115/70  SpO2:  [95 %-100 %] 97 %  Body mass index is 20.07 kg/m².     Input and Output Summary (last 24 hours):     No intake or output data in the 24 hours ending 24 1055      Physical Exam:     Physical Exam  Vitals and nursing note reviewed.   Constitutional:       Appearance: Normal appearance. She is normal weight. She is ill-appearing.      Comments: Chronically ill-appearing in bed awake   HENT:      Head: Normocephalic and atraumatic.      Right Ear: External ear normal.      Left Ear: External ear normal.      Nose: Nose normal. No congestion.      Mouth/Throat:      Mouth: Mucous membranes are moist.      Pharynx: Oropharynx is clear. No oropharyngeal exudate or posterior oropharyngeal erythema.   Eyes:      General: No scleral icterus.        Right eye: No discharge.         Left eye: No discharge.      Extraocular Movements: Extraocular movements intact.      Conjunctiva/sclera: Conjunctivae normal.      Pupils: Pupils are equal, round, and reactive to light.   Cardiovascular:      Rate and Rhythm: Normal rate and regular rhythm.      Pulses: Normal pulses.      Heart sounds: Normal heart sounds. No murmur heard.     No friction rub. No gallop.   Pulmonary:      Effort: Pulmonary effort is normal. No respiratory distress.      Breath sounds: Normal breath sounds. No stridor. No wheezing, rhonchi or rales.   Chest:      Chest wall: No tenderness.   Abdominal:      General: Abdomen is flat. Bowel sounds are  normal. There is no distension.      Palpations: Abdomen is soft. There is no mass.      Tenderness: There is no abdominal tenderness. There is no guarding or rebound.   Musculoskeletal:         General: No swelling, tenderness, deformity or signs of injury. Normal range of motion.      Cervical back: Normal range of motion and neck supple. No rigidity. No muscular tenderness.   Skin:     General: Skin is warm and dry.      Capillary Refill: Capillary refill takes less than 2 seconds.      Coloration: Skin is not jaundiced or pale.      Findings: No bruising, erythema, lesion or rash.   Neurological:      General: No focal deficit present.      Mental Status: She is alert and oriented to person, place, and time. Mental status is at baseline.      Cranial Nerves: No cranial nerve deficit.      Sensory: No sensory deficit.      Motor: No weakness.      Coordination: Coordination normal.   Psychiatric:         Mood and Affect: Mood normal.         Behavior: Behavior normal.         Thought Content: Thought content normal.         Judgment: Judgment normal.         Additional Data:     Labs:    Results from last 7 days   Lab Units 02/17/24  0444   WBC Thousand/uL 6.59   HEMOGLOBIN g/dL 10.5*   HEMATOCRIT % 32.2*   PLATELETS Thousands/uL 156   NEUTROS PCT % 81*   LYMPHS PCT % 13*   MONOS PCT % 6   EOS PCT % 0     Results from last 7 days   Lab Units 02/17/24  0444   SODIUM mmol/L 143   POTASSIUM mmol/L 3.6   CHLORIDE mmol/L 106   CO2 mmol/L 29   BUN mg/dL 24   CREATININE mg/dL 0.88   ANION GAP mmol/L 8   CALCIUM mg/dL 9.0   ALBUMIN g/dL 4.2   TOTAL BILIRUBIN mg/dL 0.40   ALK PHOS U/L 57   ALT U/L 7   AST U/L 27   GLUCOSE RANDOM mg/dL 138     Results from last 7 days   Lab Units 02/15/24  1908   INR  1.03         Results from last 7 days   Lab Units 02/15/24  1756   HEMOGLOBIN A1C % 5.3     Results from last 7 days   Lab Units 02/16/24  0521 02/15/24  2030 02/15/24  1756   LACTIC ACID mmol/L  --  0.8  --    PROCALCITONIN  ng/ml 0.11  --  <0.05           * I Have Reviewed All Lab Data Listed Above.  * Additional Pertinent Lab Tests Reviewed: All Labs For Current Hospital Admission Reviewed      Recent Cultures (last 7 days):     Results from last 7 days   Lab Units 02/15/24  2030   BLOOD CULTURE  No Growth at 24 hrs.   GRAM STAIN RESULT  Gram positive cocci in clusters*       Last 24 Hours Medication List:   Current Facility-Administered Medications   Medication Dose Route Frequency Provider Last Rate    acetaminophen  975 mg Oral Q8H PRN Stephanie Day PA-C      albuterol  2 puff Inhalation Q4H PRN [facility] Parameswaran, DO      albuterol  2.5 mg Nebulization Q4H PRN TAMMIE Christensen      aluminum-magnesium hydroxide-simethicone  30 mL Oral Q6H PRN [facility] Parameswaran, DO      aspirin  81 mg Oral Daily [facility] Parameswaran, DO      baricitinib  2 mg Oral Q24H Kenny Stone MD      budesonide-formoterol  2 puff Inhalation BID [facility], [facility]      buPROPion  75 mg Oral Daily [facility]      carvedilol  25 mg Oral BID With Meals [facility]      dexamethasone  6 mg Intravenous Q24H [facility] Parameswaran, DO      dextromethorphan-guaiFENesin  10 mL Oral Q6H PRN Stephanie Day PA-C      docusate sodium  100 mg Oral BID [facility] Parameswaran, DO      heparin (porcine)  3-20 Units/kg/hr (Order-Specific) Intravenous Titrated [facility] Parameswaran, DO 11 Units/kg/hr (02/17/24 0621)    heparin (porcine)  1,650 Units Intravenous Q6H PRN [facility] Parameswaran, DO      heparin (porcine)  3,300 Units Intravenous Q6H PRN [facility] Parameswaran, DO      ipratropium  0.5 mg Nebulization BID [facility] Parameswaran, DO      labetalol  10 mg Intravenous Q6H PRN [facility] Parameswaran, DO      levalbuterol  1.25 mg Nebulization BID [facility] Parameswaran, DO      lidocaine  2 patch Topical Daily PRN Stephanie Day PA-C      midodrine  10 mg Oral TID AC Kenny Stone MD      nitroglycerin  0.4 mg Sublingual Q5 Min PRN Jose  Paramamish, DO      ondansetron  4 mg Intravenous Q6H PRN [facility], DO      pantoprazole  40 mg Oral Daily [facility], [facility]      pravastatin  80 mg Oral QPM [facility], [facility]      remdesivir  100 mg Intravenous Q24H [facility]      vilazodone  40 mg Oral Daily With Breakfast [facility] Nikita, DO          Today, Patient Was Seen By: Kenny Alan MD    ** Please Note: Dictation voice to text software may have been used in the creation of this document. **

## 2024-02-18 ENCOUNTER — APPOINTMENT (INPATIENT)
Dept: NON INVASIVE DIAGNOSTICS | Facility: HOSPITAL | Age: 79
DRG: 871 | End: 2024-02-18
Payer: MEDICARE

## 2024-02-18 LAB
ANION GAP SERPL CALCULATED.3IONS-SCNC: 10 MMOL/L
AORTIC ROOT: 2.7 CM
APICAL FOUR CHAMBER EJECTION FRACTION: 45 %
APTT PPP: 86 SECONDS (ref 23–37)
ASCENDING AORTA: 2.6 CM
BASOPHILS # BLD AUTO: 0 THOUSANDS/ÂΜL (ref 0–0.1)
BASOPHILS NFR BLD AUTO: 0 % (ref 0–1)
BSA FOR ECHO PROCEDURE: 1.71 M2
BUN SERPL-MCNC: 27 MG/DL (ref 5–25)
CALCIUM SERPL-MCNC: 8.8 MG/DL (ref 8.4–10.2)
CHLORIDE SERPL-SCNC: 106 MMOL/L (ref 96–108)
CO2 SERPL-SCNC: 28 MMOL/L (ref 21–32)
CREAT SERPL-MCNC: 0.81 MG/DL (ref 0.6–1.3)
E WAVE DECELERATION TIME: 178 MS
E/A RATIO: 0.78
EOSINOPHIL # BLD AUTO: 0 THOUSAND/ÂΜL (ref 0–0.61)
EOSINOPHIL NFR BLD AUTO: 0 % (ref 0–6)
ERYTHROCYTE [DISTWIDTH] IN BLOOD BY AUTOMATED COUNT: 12 % (ref 11.6–15.1)
FRACTIONAL SHORTENING: 38 (ref 28–44)
GFR SERPL CREATININE-BSD FRML MDRD: 70 ML/MIN/1.73SQ M
GLUCOSE SERPL-MCNC: 117 MG/DL (ref 65–140)
HCT VFR BLD AUTO: 33.6 % (ref 34.8–46.1)
HGB BLD-MCNC: 10.9 G/DL (ref 11.5–15.4)
IMM GRANULOCYTES # BLD AUTO: 0.02 THOUSAND/UL (ref 0–0.2)
IMM GRANULOCYTES NFR BLD AUTO: 0 % (ref 0–2)
INTERVENTRICULAR SEPTUM IN DIASTOLE (PARASTERNAL SHORT AXIS VIEW): 1.8 CM
INTERVENTRICULAR SEPTUM: 1.8 CM (ref 0.6–1.1)
LAAS-AP2: 10.9 CM2
LAAS-AP4: 14.5 CM2
LEFT ATRIUM SIZE: 3.6 CM
LEFT ATRIUM VOLUME (MOD BIPLANE): 31 ML
LEFT ATRIUM VOLUME INDEX (MOD BIPLANE): 18.1 ML/M2
LEFT INTERNAL DIMENSION IN SYSTOLE: 1.8 CM (ref 2.1–4)
LEFT VENTRICLE DIASTOLIC VOLUME (MOD BIPLANE): 55 ML
LEFT VENTRICLE DIASTOLIC VOLUME INDEX (MOD BIPLANE): 32.2 ML/M2
LEFT VENTRICLE SYSTOLIC VOLUME (MOD BIPLANE): 33 ML
LEFT VENTRICLE SYSTOLIC VOLUME INDEX (MOD BIPLANE): 19.3 ML/M2
LEFT VENTRICULAR INTERNAL DIMENSION IN DIASTOLE: 2.9 CM (ref 3.5–6)
LEFT VENTRICULAR POSTERIOR WALL IN END DIASTOLE: 1.2 CM
LEFT VENTRICULAR STROKE VOLUME: 21 ML
LV EF: 41 %
LVSV (TEICH): 21 ML
LYMPHOCYTES # BLD AUTO: 0.73 THOUSANDS/ÂΜL (ref 0.6–4.47)
LYMPHOCYTES NFR BLD AUTO: 13 % (ref 14–44)
MCH RBC QN AUTO: 30.7 PG (ref 26.8–34.3)
MCHC RBC AUTO-ENTMCNC: 32.4 G/DL (ref 31.4–37.4)
MCV RBC AUTO: 95 FL (ref 82–98)
MONOCYTES # BLD AUTO: 0.24 THOUSAND/ÂΜL (ref 0.17–1.22)
MONOCYTES NFR BLD AUTO: 4 % (ref 4–12)
MV E'TISSUE VEL-SEP: 5 CM/S
MV PEAK A VEL: 1.19 M/S
MV PEAK E VEL: 93 CM/S
NEUTROPHILS # BLD AUTO: 4.54 THOUSANDS/ÂΜL (ref 1.85–7.62)
NEUTS SEG NFR BLD AUTO: 83 % (ref 43–75)
NRBC BLD AUTO-RTO: 0 /100 WBCS
PA SYSTOLIC PRESSURE: 40 MMHG
PLATELET # BLD AUTO: 160 THOUSANDS/UL (ref 149–390)
PMV BLD AUTO: 10.5 FL (ref 8.9–12.7)
POTASSIUM SERPL-SCNC: 3.9 MMOL/L (ref 3.5–5.3)
RBC # BLD AUTO: 3.55 MILLION/UL (ref 3.81–5.12)
RIGHT ATRIUM AREA SYSTOLE A4C: 12.1 CM2
RIGHT VENTRICLE ID DIMENSION: 3.1 CM
SL CV LEFT ATRIUM LENGTH A2C: 4 CM
SL CV LV EF: 35
SL CV PED ECHO LEFT VENTRICLE DIASTOLIC VOLUME (MOD BIPLANE) 2D: 31 ML
SL CV PED ECHO LEFT VENTRICLE SYSTOLIC VOLUME (MOD BIPLANE) 2D: 10 ML
SODIUM SERPL-SCNC: 144 MMOL/L (ref 135–147)
TR MAX PG: 27 MMHG
TR PEAK VELOCITY: 2.6 M/S
TRICUSPID ANNULAR PLANE SYSTOLIC EXCURSION: 2.5 CM
TRICUSPID VALVE PEAK REGURGITATION VELOCITY: 2.62 M/S
WBC # BLD AUTO: 5.53 THOUSAND/UL (ref 4.31–10.16)

## 2024-02-18 PROCEDURE — 99233 SBSQ HOSP IP/OBS HIGH 50: CPT | Performed by: INTERNAL MEDICINE

## 2024-02-18 PROCEDURE — 93306 TTE W/DOPPLER COMPLETE: CPT | Performed by: INTERNAL MEDICINE

## 2024-02-18 PROCEDURE — 80048 BASIC METABOLIC PNL TOTAL CA: CPT | Performed by: INTERNAL MEDICINE

## 2024-02-18 PROCEDURE — 94760 N-INVAS EAR/PLS OXIMETRY 1: CPT

## 2024-02-18 PROCEDURE — 85730 THROMBOPLASTIN TIME PARTIAL: CPT | Performed by: INTERNAL MEDICINE

## 2024-02-18 PROCEDURE — 93306 TTE W/DOPPLER COMPLETE: CPT

## 2024-02-18 PROCEDURE — 94664 DEMO&/EVAL PT USE INHALER: CPT

## 2024-02-18 PROCEDURE — 94640 AIRWAY INHALATION TREATMENT: CPT

## 2024-02-18 PROCEDURE — 99232 SBSQ HOSP IP/OBS MODERATE 35: CPT | Performed by: INTERNAL MEDICINE

## 2024-02-18 PROCEDURE — 85025 COMPLETE CBC W/AUTO DIFF WBC: CPT | Performed by: INTERNAL MEDICINE

## 2024-02-18 RX ORDER — ACETAMINOPHEN 10 MG/ML
1000 INJECTION, SOLUTION INTRAVENOUS ONCE
Status: COMPLETED | OUTPATIENT
Start: 2024-02-18 | End: 2024-02-18

## 2024-02-18 RX ORDER — SODIUM CHLORIDE 9 MG/ML
75 INJECTION, SOLUTION INTRAVENOUS CONTINUOUS
Status: DISPENSED | OUTPATIENT
Start: 2024-02-19 | End: 2024-02-19

## 2024-02-18 RX ORDER — ACETAMINOPHEN 325 MG/1
975 TABLET ORAL EVERY 8 HOURS PRN
Status: DISCONTINUED | OUTPATIENT
Start: 2024-02-19 | End: 2024-03-07 | Stop reason: HOSPADM

## 2024-02-18 RX ORDER — FUROSEMIDE 10 MG/ML
40 INJECTION INTRAMUSCULAR; INTRAVENOUS
Status: DISCONTINUED | OUTPATIENT
Start: 2024-02-18 | End: 2024-02-20

## 2024-02-18 RX ORDER — LIDOCAINE 50 MG/G
1 PATCH TOPICAL ONCE
Status: COMPLETED | OUTPATIENT
Start: 2024-02-18 | End: 2024-02-19

## 2024-02-18 RX ORDER — SODIUM CHLORIDE 9 MG/ML
75 INJECTION, SOLUTION INTRAVENOUS CONTINUOUS
Status: DISCONTINUED | OUTPATIENT
Start: 2024-02-18 | End: 2024-02-18

## 2024-02-18 RX ADMIN — ALBUTEROL SULFATE 2.5 MG: 2.5 SOLUTION RESPIRATORY (INHALATION) at 15:42

## 2024-02-18 RX ADMIN — GUAIFENESIN AND DEXTROMETHORPHAN 10 ML: 100; 10 SYRUP ORAL at 22:10

## 2024-02-18 RX ADMIN — DOCUSATE SODIUM 100 MG: 100 CAPSULE, LIQUID FILLED ORAL at 10:00

## 2024-02-18 RX ADMIN — FUROSEMIDE 40 MG: 10 INJECTION, SOLUTION INTRAMUSCULAR; INTRAVENOUS at 16:41

## 2024-02-18 RX ADMIN — LIDOCAINE 5% 1 PATCH: 700 PATCH TOPICAL at 22:05

## 2024-02-18 RX ADMIN — MIDODRINE HYDROCHLORIDE 10 MG: 5 TABLET ORAL at 07:26

## 2024-02-18 RX ADMIN — BUPROPION HYDROCHLORIDE 75 MG: 75 TABLET, FILM COATED ORAL at 10:00

## 2024-02-18 RX ADMIN — ZANUBRUTINIB 160 MG: 80 CAPSULE, GELATIN COATED ORAL at 17:44

## 2024-02-18 RX ADMIN — PANTOPRAZOLE SODIUM 40 MG: 40 TABLET, DELAYED RELEASE ORAL at 10:00

## 2024-02-18 RX ADMIN — LEVALBUTEROL HYDROCHLORIDE 1.25 MG: 1.25 SOLUTION RESPIRATORY (INHALATION) at 19:46

## 2024-02-18 RX ADMIN — ZANUBRUTINIB 160 MG: 80 CAPSULE, GELATIN COATED ORAL at 10:00

## 2024-02-18 RX ADMIN — ACETAMINOPHEN 1000 MG: 10 INJECTION INTRAVENOUS at 22:04

## 2024-02-18 RX ADMIN — ASPIRIN 81 MG: 81 TABLET, COATED ORAL at 10:00

## 2024-02-18 RX ADMIN — LEVALBUTEROL HYDROCHLORIDE 1.25 MG: 1.25 SOLUTION RESPIRATORY (INHALATION) at 07:30

## 2024-02-18 RX ADMIN — CARVEDILOL 25 MG: 12.5 TABLET, FILM COATED ORAL at 17:41

## 2024-02-18 RX ADMIN — VILAZODONE HYDROCHLORIDE 40 MG: 20 TABLET ORAL at 07:27

## 2024-02-18 RX ADMIN — DOCUSATE SODIUM 100 MG: 100 CAPSULE, LIQUID FILLED ORAL at 17:41

## 2024-02-18 RX ADMIN — DEXAMETHASONE SODIUM PHOSPHATE 6 MG: 10 INJECTION, SOLUTION INTRAMUSCULAR; INTRAVENOUS at 22:01

## 2024-02-18 RX ADMIN — PRAVASTATIN SODIUM 80 MG: 80 TABLET ORAL at 17:41

## 2024-02-18 RX ADMIN — REMDESIVIR 100 MG: 100 INJECTION, POWDER, LYOPHILIZED, FOR SOLUTION INTRAVENOUS at 20:19

## 2024-02-18 RX ADMIN — Medication 3 MG: at 23:19

## 2024-02-18 RX ADMIN — BUDESONIDE AND FORMOTEROL FUMARATE DIHYDRATE 2 PUFF: 160; 4.5 AEROSOL RESPIRATORY (INHALATION) at 17:45

## 2024-02-18 RX ADMIN — BUDESONIDE AND FORMOTEROL FUMARATE DIHYDRATE 2 PUFF: 160; 4.5 AEROSOL RESPIRATORY (INHALATION) at 10:00

## 2024-02-18 RX ADMIN — MIDODRINE HYDROCHLORIDE 10 MG: 5 TABLET ORAL at 11:00

## 2024-02-18 RX ADMIN — MIDODRINE HYDROCHLORIDE 10 MG: 5 TABLET ORAL at 17:41

## 2024-02-18 RX ADMIN — IPRATROPIUM BROMIDE 0.5 MG: 0.5 SOLUTION RESPIRATORY (INHALATION) at 19:46

## 2024-02-18 RX ADMIN — BARICITINIB 2 MG: 2 TABLET, FILM COATED ORAL at 10:00

## 2024-02-18 RX ADMIN — IPRATROPIUM BROMIDE 0.5 MG: 0.5 SOLUTION RESPIRATORY (INHALATION) at 07:30

## 2024-02-18 RX ADMIN — CARVEDILOL 25 MG: 12.5 TABLET, FILM COATED ORAL at 07:25

## 2024-02-18 NOTE — RESPIRATORY THERAPY NOTE
RT Protocol Note  Lauren Quintero 78 y.o. female MRN: [id number]  Unit/Bed#: -01 Encounter: [id number]    Assessment    Principal Problem:    NSTEMI (non-ST elevated myocardial infarction) ([facility])  Active Problems:    Essential hypertension    Acute on chronic respiratory failure with hypoxia ([facility])    Sepsis ([facility])    Major depressive disorder, recurrent episode, moderate ([facility])    COPD without exacerbation ([facility])    COVID-19      Home Pulmonary Medications:  DUO TID  Home Devices/Therapy: Home O2    Past Medical History:   Diagnosis Date    Acute congestive heart failure (HCC) 2021    Anxiety     Cardiac disease     Chest pain 2021    Chronic pain disorder     COPD (chronic obstructive pulmonary disease) ([facility])     Depression     Heart disease     Hyperlipidemia     Hypertension     MI (myocardial infarction) ([facility])     MRSA (methicillin resistant Staphylococcus aureus)     Renal disorder     benign kidney tumor     Social History     Socioeconomic History    Marital status:      Spouse name: None    Number of children: 3    Years of education: 13    Highest education level: High school graduate   Occupational History    Occupation:      Employer: [employer]     Comment: retired    Tobacco Use    Smoking status: Former     Current packs/day: 0.00     Average packs/day: 1 pack/day for 60.0 years (60.0 ttl pk-yrs)     Types: Cigarettes     Start date:      Quit date: 2016     Years since quittin.1    Smokeless tobacco: Never    Tobacco comments:     She has close to a 60 pack-year smoking history, most recently has cut down to 4-5 cigarettes daily   Vaping Use    Vaping status: Never Used   Substance and Sexual Activity    Alcohol use: Never    Drug use: No    Sexual activity: Not Currently   Other Topics Concern    None   Social History Narrative    None     Social Determinants of Health     Financial Resource Strain: Patient Declined (2024)    Received from  [facility]    Overall Financial Resource Strain (CARDIA)     Difficulty of Paying Living Expenses: Patient declined   Food Insecurity: No Food Insecurity (2/17/2024)    Hunger Vital Sign     Worried About Running Out of Food in the Last Year: Never true     Ran Out of Food in the Last Year: Never true   Transportation Needs: No Transportation Needs (2/17/2024)    PRAPARE - Transportation     Lack of Transportation (Medical): No     Lack of Transportation (Non-Medical): No   Physical Activity: Patient Declined (1/6/2024)    Received from [facility]    Exercise Vital Sign     Days of Exercise per Week: Patient declined     Minutes of Exercise per Session: Patient declined   Stress: Patient Declined (1/6/2024)    Received from [facility]    [place] [employer] - Occupational Stress Questionnaire     Feeling of Stress : Patient declined   Social Connections: Patient Declined (1/6/2024)    Received from [facility]    Social Connection and Isolation Panel [NHANES]     Frequency of Communication with Friends and Family: Patient declined     Frequency of Social Gatherings with Friends and Family: Patient declined     Attends Jehovah's witness Services: Patient declined     Active Member of Clubs or Organizations: Patient declined     Attends Club or Organization Meetings: Patient declined     Marital Status: Patient declined   Intimate Partner Violence: Patient Declined (1/6/2024)    Received from [facility]    Humiliation, Afraid, Rape, and Kick questionnaire     Fear of Current or Ex-Partner: Patient declined     Emotionally Abused: Patient declined     Physically Abused: Patient declined     Sexually Abused: Patient declined   Housing Stability: Low Risk  (2/17/2024)    Housing Stability Vital Sign     Unable to Pay for Housing in the Last Year: No     Number of Places Lived in the Last Year: 2     Unstable Housing in the Last Year: No       Subjective    Subjective Data: awake  "and alert    Objective    Physical Exam:   Assessment Type: Assess only  General Appearance: Awake  Respiratory Pattern: Dyspnea with exertion  Chest Assessment: Chest expansion symmetrical  Bilateral Breath Sounds: Clear, Diminished    Vitals:  Blood pressure 134/76, pulse 77, temperature (!) 97.1 °F (36.2 °C), resp. rate 20, height 5' 8\" (1.727 m), weight 59.9 kg (132 lb), SpO2 94%, not currently breastfeeding.          Imaging and other studies:     O2 Device: mfnc     Plan    Respiratory Plan: Home Bronchodilator Patient pathway        Resp Comments: patient with pulmonary history admitted for NSTEMI found to have covid upon admissiton. Continue with home medications and home O2.   "

## 2024-02-18 NOTE — PROGRESS NOTES
Cardiology Progress Note - Lauren Quintero 78 y.o. female MRN: [id number]    Unit/Bed#: -01 Encounter: [id number]      Assessment/Plan:  1.  Sepsis/COVID-19  2.  Acute on chronic hypoxic respiratory failure  3.  Chest pain/elevated troponins  Troponins elevated with peak of 4751.  EKG with anterolateral T wave inversions concerning for Wellens syndrome.  TTE shows new CM with EF 35%.  Continue ASA, pravastatin, Coreg, and heparin  gtt.  Plan for cardiac catheterization tomorrow.  Discussed the indications, alternatives, risks and benefit of cardiac catheterization and possible PCI. The procedure risks, benefits, and complications (including but not limited to bleeding, infection, arrhythmia, nephrotoxicity, vessel injury, myocardial infarction, CVA, and death) were reviewed.  Patient is alert and oriented x3 and wishes to proceed. All questions answered.     4.  New CM with EF 35%  TTE shows EF 35%, hypokinesis of the apex, mid anteroseptal, mid inferoseptal, apical anterior, apical septal, and apical inferior segments, G1 DD, severe MAC, mild MR, dilated IVC, and mildly elevated PASP (40.0 mmHg).  Cannot rule out ischemic etiology, see plan above.  Continue Coreg.  Plan for further optimization of GDMT following cardiac catheterization.  Patient will need consideration for LifeVest prior to discharge.    5.  Acute on chronic HFrEF  Hypervolemic on exam.  Start IV Lasix 40 mg bid.  Strict I/O's and daily weights; recommend sodium restriction    6.  CAD s/p DANIELLA to mRCA (2018)  Continue ASA, pravastatin, and Coreg    7.  PSVT  Brief episodes of PSVT noted on telemetry, continue Coreg.    8.  Hypertension  BP has improved, consider discontinuing midodrine.  Continue Coreg, start IV Lasix.    9.  Hyperlipidemia  Continue pravastatin    10.  CKD 2-3  11.  COPD  12.  IFG      Subjective:   Patient seen and examined.  No significant events overnight.  Patient resting in bed, endorses mild orthopnea.  Denies any  "additional complaints at this time.  All questions were answered.    Objective:     Vitals: Blood pressure 140/75, pulse 77, temperature 98 °F (36.7 °C), temperature source Oral, resp. rate 20, height 5' 8\" (1.727 m), weight 59.9 kg (132 lb), SpO2 94%, not currently breastfeeding., Body mass index is 20.07 kg/m².,   Orthostatic Blood Pressures      Flowsheet Row Most Recent Value   Blood Pressure 140/75 filed at 02/18/2024 1005   Patient Position - Orthostatic VS Lying filed at 02/18/2024 0700              Intake/Output Summary (Last 24 hours) at 2/18/2024 1503  Last data filed at 2/17/2024 1701  Gross per 24 hour   Intake --   Output 75 ml   Net -75 ml         Physical Exam:  GEN: Alert and oriented x3, in no acute distress.  Well appearing and well nourished.  NC in place.  HEENT: Sclera anicteric, conjunctivae pink, mucous membranes moist. Oropharynx clear.   NECK: Supple, no carotid bruits, + JVD. Trachea midline, no thyromegaly.   HEART: Regular rhythm, normal S1 and S2, no murmurs, clicks, gallops or rubs. PMI nondisplaced, no thrills.   LUNGS: Decreased breath sounds bilaterally; no wheezes, rales, or rhonchi. No increased work of breathing or signs of respiratory distress.   ABDOMEN: Soft, nontender, nondistended, normoactive bowel sounds.   EXTREMITIES: Skin warm and well perfused, no clubbing, cyanosis, or edema.  NEURO: No focal findings. Normal speech. Mood and affect normal.   SKIN: Normal without suspicious lesions on exposed skin.        Medications:      Current Facility-Administered Medications:     acetaminophen (TYLENOL) tablet 975 mg, 975 mg, Oral, Q8H PRN, Stephanie Day PA-C, 975 mg at 02/17/24 0031    albuterol (PROVENTIL HFA,VENTOLIN HFA) inhaler 2 puff, 2 puff, Inhalation, Q4H PRN, Jose Shelton DO, 2 puff at 02/17/24 1858    albuterol inhalation solution 2.5 mg, 2.5 mg, Nebulization, Q4H PRN, TAMMIE Christensen, 2.5 mg at 02/17/24 2255    aluminum-magnesium hydroxide-simethicone " (MAALOX) oral suspension 30 mL, 30 mL, Oral, Q6H PRN, Jose Parameswaran, DO    aspirin (ECOTRIN LOW STRENGTH) EC tablet 81 mg, 81 mg, Oral, Daily, Jose Parameswaran, DO, 81 mg at 02/18/24 1000    baricitinib (OLUMIANT) tablet 2 mg, 2 mg, Oral, Q24H, Kenny Stone MD, 2 mg at 02/18/24 1000    budesonide-formoterol (SYMBICORT) 160-4.5 mcg/act inhaler 2 puff, 2 puff, Inhalation, BID, Jose Parameswaran, DO, 2 puff at 02/18/24 1000    buPROPion (WELLBUTRIN) tablet 75 mg, 75 mg, Oral, Daily, Jose Parameswaran, DO, 75 mg at 02/18/24 1000    carvedilol (COREG) tablet 25 mg, 25 mg, Oral, BID With Meals, Jose Parameswaran, DO, 25 mg at 02/18/24 0725    dexamethasone (PF) (DECADRON) injection 6 mg, 6 mg, Intravenous, Q24H, Jose Parameswaran, DO, 6 mg at 02/17/24 2316    dextromethorphan-guaiFENesin (ROBITUSSIN DM) oral syrup 10 mL, 10 mL, Oral, Q6H PRN, Stephanie Day PA-C, 10 mL at 02/17/24 0031    docusate sodium (COLACE) capsule 100 mg, 100 mg, Oral, BID, Jose Parameswaran, DO, 100 mg at 02/18/24 1000    furosemide (LASIX) injection 40 mg, 40 mg, Intravenous, BID (diuretic), Mio Montgomery PA-C    heparin (porcine) 25,000 units in 0.45% NaCl 250 mL infusion (premix), 3-20 Units/kg/hr (Order-Specific), Intravenous, Titrated, Jose Parameswaran, DO, Last Rate: 6.1 mL/hr at 02/17/24 1625, 11.1 Units/kg/hr at 02/17/24 1625    heparin (porcine) injection 1,650 Units, 1,650 Units, Intravenous, Q6H PRN, Jose Parameswaran, DO, 1,650 Units at 02/17/24 0624    heparin (porcine) injection 3,300 Units, 3,300 Units, Intravenous, Q6H PRN, Jose Shelton, DO    ipratropium (ATROVENT) 0.02 % inhalation solution 0.5 mg, 0.5 mg, Nebulization, BID, Jose Shelton, DO, 0.5 mg at 02/18/24 0730    labetalol (NORMODYNE) injection 10 mg, 10 mg, Intravenous, Q6H PRN, Jose Shelton, DO    levalbuterol (XOPENEX) inhalation solution 1.25 mg, 1.25 mg, Nebulization, BID, Jose Shelton, , 1.25 mg at 02/18/24  0730    lidocaine (LIDODERM) 5 % patch 2 patch, 2 patch, Topical, Daily PRN, Stephanie Day PA-C, 2 patch at 02/17/24 0031    melatonin tablet 3 mg, 3 mg, Oral, HS PRN, TAMMIE Nunez, 3 mg at 02/17/24 2310    midodrine (PROAMATINE) tablet 10 mg, 10 mg, Oral, TID AC, Kenny Stone MD, 10 mg at 02/18/24 0726    nitroglycerin (NITROSTAT) SL tablet 0.4 mg, 0.4 mg, Sublingual, Q5 Min PRN, Jose Shelton, DO, 0.4 mg at 02/17/24 0132    ondansetron (ZOFRAN) injection 4 mg, 4 mg, Intravenous, Q6H PRN, Jose Talaveraan, DO    pantoprazole (PROTONIX) EC tablet 40 mg, 40 mg, Oral, Daily, Jose Shelton DO, 40 mg at 02/18/24 1000    pravastatin (PRAVACHOL) tablet 80 mg, 80 mg, Oral, QPM, Jose Marcelinoeswaran, DO, 80 mg at 02/17/24 1739    [COMPLETED] remdesivir (Veklury) 200 mg in sodium chloride 0.9 % 290 mL IVPB, 200 mg, Intravenous, Q24H, Stopped at 02/15/24 2224 **FOLLOWED BY** remdesivir (Veklury) 100 mg in sodium chloride 0.9 % 270 mL IVPB, 100 mg, Intravenous, Q24H, Jose Marcelinoeswaran, DO, 100 mg at 02/17/24 2039    vilazodone (VIIBRYD) tablet 40 mg, 40 mg, Oral, Daily With Breakfast, Jose Marcelinoeswaran, DO, 40 mg at 02/18/24 0727    Zanubrutinib CAPS 160 mg, 160 mg, Oral, BID, Kenny Stone MD, 160 mg at 02/18/24 1000     Labs & Results:     Results from last 7 days   Lab Units 02/17/24  0910 02/17/24  0656 02/17/24  0444 02/16/24  1309 02/16/24  1139 02/16/24  0921 02/15/24  2154 02/15/24  1956 02/15/24  1756   HS TNI 0HR ng/L  --   --  2,584*  --   --  3,874*  --   --  1,440*   HS TNI 2HR ng/L  --  2,049*  --   --  4,621*  --   --  3,047*  --    HSTNI D2 ng/L  --  -535  --   --  747*  --   --  1,607*  --    HS TNI 4HR ng/L 2,212*  --   --  4,751*  --   --  3,138*  --   --    HSTNI D4 ng/L -372  --   --  877*  --   --  1,698*  --   --      Results from last 7 days   Lab Units 02/18/24 0448 02/17/24 0444 02/16/24  0521   WBC Thousand/uL 5.53 6.59 9.20   HEMOGLOBIN g/dL  "10.9* 10.5* 13.4   HEMATOCRIT % 33.6* 32.2* 42.1   PLATELETS Thousands/uL 160 156 302     Results from last 7 days   Lab Units 24  0521   TRIGLYCERIDES mg/dL 143   HDL mg/dL 55     Results from last 7 days   Lab Units 24  0448 24  0444 24  0521 02/15/24  1756   POTASSIUM mmol/L 3.9 3.6 4.2 4.0   CHLORIDE mmol/L 106 106 102 107   CO2 mmol/L 28 29 26 25   BUN mg/dL 27* 24 15 14   CREATININE mg/dL 0.81 0.88 0.97 0.90   CALCIUM mg/dL 8.8 9.0 9.2 8.7   ALK PHOS U/L  --  57 79 70   ALT U/L  --  7 5* 3*   AST U/L  --  27 26 17     Results from last 7 days   Lab Units 24  0448 24  1914 24  1259 24  0206 02/15/24  1908   INR   --   --   --   --  1.03   PTT seconds 86* 61* 79*   < > 30    < > = values in this interval not displayed.     Results from last 7 days   Lab Units 24  0521   MAGNESIUM mg/dL 1.7*       Vitals: Blood pressure 140/75, pulse 77, temperature 98 °F (36.7 °C), temperature source Oral, resp. rate 20, height 5' 8\" (1.727 m), weight 59.9 kg (132 lb), SpO2 94%, not currently breastfeeding., Body mass index is 20.07 kg/m².,   Orthostatic Blood Pressures      Flowsheet Row Most Recent Value   Blood Pressure 140/75 filed at 2024 1005   Patient Position - Orthostatic VS Lying filed at 2024 0700            Systolic (24hrs), Av , Min:134 , Max:144     Diastolic (24hrs), Av, Min:75, Max:76        Intake/Output Summary (Last 24 hours) at 2024 1503  Last data filed at 2024 1701  Gross per 24 hour   Intake --   Output 75 ml   Net -75 ml       Invasive Devices       Peripheral Intravenous Line  Duration             Peripheral IV 02/15/24 Dorsal (posterior);Left Hand 2 days    Peripheral IV 02/15/24 Right Antecubital 2 days                    Telemetry:  Telemetry Orders (From admission, onward)               24 Hour Telemetry Monitoring  Continuous x 24 Hours (Telem)        Question:  Reason for 24 Hour Telemetry  Answer:  PCI/EP study " (including pacer and ICD implementation), Cardiac surgery, MI, abnormal cardiac cath, and chest pain- rule out MI                       BP Readings from Last 3 Encounters:   02/18/24 140/75   12/15/23 162/79   11/21/23 153/77      Wt Readings from Last 3 Encounters:   02/18/24 59.9 kg (132 lb)   12/13/23 56.7 kg (125 lb)   11/18/23 55.2 kg (121 lb 11.1 oz)

## 2024-02-18 NOTE — RESPIRATORY THERAPY NOTE
RT Protocol Note  Lauren Quintero 78 y.o. female MRN: [id number]  Unit/Bed#: -01 Encounter: [id number]    Assessment    Principal Problem:    NSTEMI (non-ST elevated myocardial infarction) ([facility])  Active Problems:    Essential hypertension    Acute on chronic respiratory failure with hypoxia ([facility])    Sepsis ([facility])    Major depressive disorder, recurrent episode, moderate ([facility])    COPD without exacerbation ([facility])    COVID-19      Home Pulmonary Medications:     02/18/24 0731   Respiratory Protocol   Protocol Initiated? No   Protocol Selection Respiratory   Language Barrier? No   Medical & Social History Reviewed? Yes   Diagnostic Studies Reviewed? Yes   Physical Assessment Performed? Yes   Home Devices/Therapy Home O2   Respiratory Plan Home Bronchodilator Patient pathway   Respiratory Assessment   Assessment Type Pre-treatment   General Appearance Alert;Awake   Respiratory Pattern Dyspnea at rest   Chest Assessment Chest expansion symmetrical   Bilateral Breath Sounds Diminished   Resp Comments continue home therapy   O2 Device nc   Additional Assessments   SpO2 94 %       Home Devices/Therapy: Home O2    Past Medical History:   Diagnosis Date    Acute congestive heart failure (HCC) 8/27/2021    Anxiety     Cardiac disease     Chest pain 8/27/2021    Chronic pain disorder     COPD (chronic obstructive pulmonary disease) ([facility])     Depression     Heart disease     Hyperlipidemia     Hypertension     MI (myocardial infarction) ([facility])     MRSA (methicillin resistant Staphylococcus aureus)     Renal disorder     benign kidney tumor     Social History     Socioeconomic History    Marital status:      Spouse name: None    Number of children: 3    Years of education: 13    Highest education level: High school graduate   Occupational History    Occupation:      Employer: [employer]     Comment: retired    Tobacco Use    Smoking status: Former     Current packs/day: 0.00     Average  packs/day: 1 pack/day for 60.0 years (60.0 ttl pk-yrs)     Types: Cigarettes     Start date:      Quit date: 2016     Years since quittin.1    Smokeless tobacco: Never    Tobacco comments:     She has close to a 60 pack-year smoking history, most recently has cut down to 4-5 cigarettes daily   Vaping Use    Vaping status: Never Used   Substance and Sexual Activity    Alcohol use: Never    Drug use: No    Sexual activity: Not Currently   Other Topics Concern    None   Social History Narrative    None     Social Determinants of Health     Financial Resource Strain: Patient Declined (2024)    Received from [facility]    Overall Financial Resource Strain (CARDIA)     Difficulty of Paying Living Expenses: Patient declined   Food Insecurity: No Food Insecurity (2024)    Hunger Vital Sign     Worried About Running Out of Food in the Last Year: Never true     Ran Out of Food in the Last Year: Never true   Transportation Needs: No Transportation Needs (2024)    PRAPARE - Transportation     Lack of Transportation (Medical): No     Lack of Transportation (Non-Medical): No   Physical Activity: Patient Declined (2024)    Received from [facility]    Exercise Vital Sign     Days of Exercise per Week: Patient declined     Minutes of Exercise per Session: Patient declined   Stress: Patient Declined (2024)    Received from [facility]    [place] [employer] - Occupational Stress Questionnaire     Feeling of Stress : Patient declined   Social Connections: Patient Declined (2024)    Received from [facility]    Social Connection and Isolation Panel [NHANES]     Frequency of Communication with Friends and Family: Patient declined     Frequency of Social Gatherings with Friends and Family: Patient declined     Attends Anabaptist Services: Patient declined     Active Member of Clubs or Organizations: Patient declined     Attends Club or Organization Meetings:  "Patient declined     Marital Status: Patient declined   Intimate Partner Violence: Patient Declined (1/6/2024)    Received from [facility]    Humiliation, Afraid, Rape, and Kick questionnaire     Fear of Current or Ex-Partner: Patient declined     Emotionally Abused: Patient declined     Physically Abused: Patient declined     Sexually Abused: Patient declined   Housing Stability: Low Risk  (2/17/2024)    Housing Stability Vital Sign     Unable to Pay for Housing in the Last Year: No     Number of Places Lived in the Last Year: 2     Unstable Housing in the Last Year: No       Subjective    Subjective Data: awake and alert    Objective    Physical Exam:   Assessment Type: Pre-treatment  General Appearance: Alert, Awake  Respiratory Pattern: Dyspnea at rest  Chest Assessment: Chest expansion symmetrical  Bilateral Breath Sounds: Diminished  O2 Device: nc    Vitals:  Blood pressure 140/75, pulse 77, temperature 98 °F (36.7 °C), temperature source Oral, resp. rate 20, height 5' 8\" (1.727 m), weight 59.9 kg (132 lb), SpO2 94%, not currently breastfeeding.          Imaging and other studies: I have personally reviewed pertinent reports.      O2 Device: nc     Plan    Respiratory Plan: Home Bronchodilator Patient pathway        Resp Comments: continue home therapy   "

## 2024-02-18 NOTE — PLAN OF CARE
Problem: Potential for Falls  Goal: Patient will remain free of falls  Description: INTERVENTIONS:  - Educate patient/family on patient safety including physical limitations  - Instruct patient to call for assistance with activity   - Consult OT/PT to assist with strengthening/mobility   - Keep Call bell within reach  - Keep bed low and locked with side rails adjusted as appropriate  - Keep care items and personal belongings within reach  - Initiate and maintain comfort rounds  - Make Fall Risk Sign visible to staff  - Offer Toileting every  Hours, in advance of need  - Initiate/Maintain alarm  - Obtain necessary fall risk management equipment:   - Apply yellow socks and bracelet for high fall risk patients  - Consider moving patient to room near nurses station  2/18/2024 1745 by Lorna Mead, RN  Outcome: Progressing  2/18/2024 1745 by Lorna Mead, RN  Outcome: Progressing

## 2024-02-18 NOTE — PROGRESS NOTES
[facility]  Progress Note  Name: Lauren Quintero I  MRN: [id number]  Unit/Bed#: -01 I Date of Admission: 2/15/2024   Date of Service: 2/18/2024 I Hospital Day: 3    Assessment/Plan   COVID-19  Assessment & Plan  Lab Results   Component Value Date    SARSCOV2 Positive (A) 02/15/2024    SARSCOV2 Negative 12/12/2023    SARSCOV2 Negative 10/28/2023       Symptoms of COVID-19 SYMPTOMS: shortness of breath and Tachycardia (more than 100 beats per min)  Vaccine status: vaccinated  The patient does have acute hypoxemic respiratory failure given the same    CT chest w contrast showing Scattered areas of tree-in-bud opacity consistent with endobronchial impaction/infection      Continue remdesevir for 5 days, Dexamethasone for 10 days  Continue baricitinib given worsening oxygen requirement on 2/16  Check inflammatory markers  Therapeutic Anticoagulation per guidelines -the patient is on heparin infusion given her elevated troponins as well  Continue to monitor and attempt wean oxygen  Patient requires continued inpatient stay    * NSTEMI (non-ST elevated myocardial infarction) (HCC)  Assessment & Plan  Lab Results   Component Value Date    HSTNI0 2,584 (H) 02/17/2024    HSTNI2 2,049 (H) 02/17/2024    HSTNID2 -535 02/17/2024      CT chest showing no evidence of pulmonary embolism, did note mild pulmonary edema  No chest pain present, but significant SOB, anginal equivalent, sudden onset, fairly elevated troponins  EKG: Sinus Tachycardia with no obvious STEMI  Suspect a true type I NSTEMI  ADRIEN Score: 5    The patient's presentation with shortness of breath was somewhat sudden according to the son, he was driving her home from the rehab facility if she got acutely short of breath and hypoxemic again.  Certainly it is possible that the COVID infection may have triggered some degree of underlying CAD leading to an NSTEMI.  Continue heparin infusion for now  Cardiology follow-up appreciated - plan for  cardiac cath tomorrow 02/19.        COPD without exacerbation (HCC)  Assessment & Plan  Not wheezing but SOB due to COVID  Continue IV steroids for COVID    Major depressive disorder, recurrent episode, moderate (HCC)  Assessment & Plan  Continue wellbutrin, vilazodone  Also takes Vraylar; recommend bringing from home as we do not have it on formulary - patient now has it     Sepsis (HCC)  Assessment & Plan  POA as evidenced by tachycardia, tachypnea  Sepsis source: COVID postiive, CT imaging showing signs of infection    Continue COVID treatment with baricitinib, dexamethasone, remdesivir  Continue to attempt wean oxygen  Prognosis overall guarded given age, comorbidities, poor functional status at baseline, discussed with patient and son in detail, both endorse DNR/DNI status.  Continue current treatment      Acute on chronic respiratory failure with hypoxia (HCC)  Assessment & Plan  Currently SpO2: 97 % on Nasal Cannula O2 Flow Rate (L/min): 6 L/min    At baseline, uses 4L NC  CT Imaging showing mild pulm edema, scattered areas of tree-in-bud opacity    Continue to treat underlying NSTEMI, COVID, mild pulm edema  Wean O2 as able      Essential hypertension  Assessment & Plan    Continue home meds; initiate lisinopril as part of NSTEMI management  Continue carvedilol  PRN IV Labetalol for SBP > 160  Monitor blood pressure                 VTE Pharmacologic Prophylaxis:   Pharmacologic: Heparin Drip  Mechanical VTE Prophylaxis in Place: Yes    Patient Centered Rounds: I have performed bedside rounds with nursing staff today.    Discussions with Specialists or Other Care Team Provider: Discussed with care management team    Education and Discussions with Family / Patient: Patient herself - she did not ask me to talk to anyone    Time Spent for Care: 45 minutes.  More than 50% of total time spent on counseling and coordination of care as described above.    Current Length of Stay: 3 day(s)    Current Patient Status:  Inpatient   Certification Statement: The patient will continue to require additional inpatient hospital stay due to need for IV heparin    Discharge Plan: 24-48h    Code Status: Level 3 - DNAR and DNI      Subjective:     Patient evaluated this morning, denies any CP but has some SOB.  Denies nausea, vomiting.    Objective:     Vitals:   Temp (24hrs), Av.6 °F (36.4 °C), Min:97.1 °F (36.2 °C), Max:98 °F (36.7 °C)    Temp:  [97.1 °F (36.2 °C)-98 °F (36.7 °C)] 98 °F (36.7 °C)  HR:  [77-99] 77  Resp:  [20-22] 20  BP: (134-144)/(75-76) 140/75  SpO2:  [87 %-96 %] 94 %  Body mass index is 20.07 kg/m².     Input and Output Summary (last 24 hours):       Intake/Output Summary (Last 24 hours) at 2024 1052  Last data filed at 2024 1701  Gross per 24 hour   Intake --   Output 75 ml   Net -75 ml       Physical Exam:     Physical Exam  Vitals and nursing note reviewed.   Constitutional:       Appearance: Normal appearance. She is normal weight.      Comments: Female in bed, awake   HENT:      Head: Normocephalic and atraumatic.      Right Ear: External ear normal.      Left Ear: External ear normal.      Nose: Nose normal. No congestion.      Mouth/Throat:      Mouth: Mucous membranes are moist.      Pharynx: Oropharynx is clear. No oropharyngeal exudate or posterior oropharyngeal erythema.   Eyes:      General: No scleral icterus.        Right eye: No discharge.         Left eye: No discharge.      Extraocular Movements: Extraocular movements intact.      Conjunctiva/sclera: Conjunctivae normal.      Pupils: Pupils are equal, round, and reactive to light.   Cardiovascular:      Rate and Rhythm: Normal rate and regular rhythm.      Pulses: Normal pulses.      Heart sounds: Normal heart sounds. No murmur heard.     No friction rub. No gallop.   Pulmonary:      Effort: Pulmonary effort is normal. No respiratory distress.      Breath sounds: No stridor. Rales present. No wheezing or rhonchi.      Comments: On O2  cannula  Rales in bases  Chest:      Chest wall: No tenderness.   Abdominal:      General: Abdomen is flat. Bowel sounds are normal. There is no distension.      Palpations: Abdomen is soft. There is no mass.      Tenderness: There is no abdominal tenderness. There is no guarding or rebound.   Musculoskeletal:         General: No swelling, tenderness, deformity or signs of injury. Normal range of motion.      Cervical back: Normal range of motion and neck supple. No rigidity. No muscular tenderness.   Skin:     General: Skin is warm and dry.      Capillary Refill: Capillary refill takes less than 2 seconds.      Coloration: Skin is not jaundiced or pale.      Findings: No bruising, erythema, lesion or rash.   Neurological:      General: No focal deficit present.      Mental Status: She is alert and oriented to person, place, and time. Mental status is at baseline.      Cranial Nerves: No cranial nerve deficit.      Sensory: No sensory deficit.      Motor: No weakness.      Coordination: Coordination normal.   Psychiatric:         Mood and Affect: Mood normal.         Behavior: Behavior normal.         Thought Content: Thought content normal.         Judgment: Judgment normal.         Additional Data:     Labs:    Results from last 7 days   Lab Units 02/18/24  0448   WBC Thousand/uL 5.53   HEMOGLOBIN g/dL 10.9*   HEMATOCRIT % 33.6*   PLATELETS Thousands/uL 160   NEUTROS PCT % 83*   LYMPHS PCT % 13*   MONOS PCT % 4   EOS PCT % 0     Results from last 7 days   Lab Units 02/18/24  0448 02/17/24  0444   SODIUM mmol/L 144 143   POTASSIUM mmol/L 3.9 3.6   CHLORIDE mmol/L 106 106   CO2 mmol/L 28 29   BUN mg/dL 27* 24   CREATININE mg/dL 0.81 0.88   ANION GAP mmol/L 10 8   CALCIUM mg/dL 8.8 9.0   ALBUMIN g/dL  --  4.2   TOTAL BILIRUBIN mg/dL  --  0.40   ALK PHOS U/L  --  57   ALT U/L  --  7   AST U/L  --  27   GLUCOSE RANDOM mg/dL 117 138     Results from last 7 days   Lab Units 02/15/24  1908   INR  1.03         Results from  last 7 days   Lab Units 02/15/24  1756   HEMOGLOBIN A1C % 5.3     Results from last 7 days   Lab Units 02/16/24  0521 02/15/24  2030 02/15/24  1756   LACTIC ACID mmol/L  --  0.8  --    PROCALCITONIN ng/ml 0.11  --  <0.05           * I Have Reviewed All Lab Data Listed Above.  * Additional Pertinent Lab Tests Reviewed: All Labs For Current Hospital Admission Reviewed      Recent Cultures (last 7 days):     Results from last 7 days   Lab Units 02/15/24  2030   BLOOD CULTURE  Staphylococcus species*  Staphylococcus species*   GRAM STAIN RESULT  Gram positive cocci in clusters*  Gram positive cocci in clusters*       Last 24 Hours Medication List:   Current Facility-Administered Medications   Medication Dose Route Frequency Provider Last Rate    acetaminophen  975 mg Oral Q8H PRN Stephanie Day PA-C      albuterol  2 puff Inhalation Q4H PRN [facility] Nikita, DO      albuterol  2.5 mg Nebulization Q4H PRN TAMMIE Christensen      aluminum-magnesium hydroxide-simethicone  30 mL Oral Q6H PRN [facility] Nikita, DO      aspirin  81 mg Oral Daily [facility] Nikita, DO      baricitinib  2 mg Oral Q24H Kenny Stone MD      budesonide-formoterol  2 puff Inhalation BID [facility] Nikita, DO      buPROPion  75 mg Oral Daily [facility] Ilanan, DO      carvedilol  25 mg Oral BID With Meals [facility] Nikita, DO      dexamethasone  6 mg Intravenous Q24H [facility] Nikita, DO      dextromethorphan-guaiFENesin  10 mL Oral Q6H PRN Stephanie Day PA-C      docusate sodium  100 mg Oral BID [facility] Ilanan, DO      heparin (porcine)  3-20 Units/kg/hr (Order-Specific) Intravenous Titrated [facility] Parameswaran, DO 11.1 Units/kg/hr (02/17/24 1625)    heparin (porcine)  1,650 Units Intravenous Q6H PRN [facility] Paramemeliwaran, DO      heparin (porcine)  3,300 Units Intravenous Q6H PRN [facility] Ilanan, DO      ipratropium  0.5 mg Nebulization BID [facility] Nikita, DO      labetalol  10 mg Intravenous Q6H PRN [facility]  Paramamish, DO      levalbuterol  1.25 mg Nebulization BID [facility], DO      lidocaine  2 patch Topical Daily PRN Stephanie Day PA-C      melatonin  3 mg Oral HS PRN TAMMIE Nunez      midodrine  10 mg Oral TID AC Kenny Alan MD      nitroglycerin  0.4 mg Sublingual Q5 Min PRN [facility], DO      ondansetron  4 mg Intravenous Q6H PRN [facility], DO      pantoprazole  40 mg Oral Daily [facility], DO      pravastatin  80 mg Oral QPM [facility], DO      remdesivir  100 mg Intravenous Q24H [facility], DO      vilazodone  40 mg Oral Daily With Breakfast [facility], DO      Zanubrutinib  160 mg Oral BID Kenny Alan MD          Today, Patient Was Seen By: Kenny Alan MD    ** Please Note: Dictation voice to text software may have been used in the creation of this document. **

## 2024-02-18 NOTE — PLAN OF CARE
Problem: Potential for Falls  Goal: Patient will remain free of falls  Description: INTERVENTIONS:  - Educate patient/family on patient safety including physical limitations  - Instruct patient to call for assistance with activity   - Consult OT/PT to assist with strengthening/mobility   - Keep Call bell within reach  - Keep bed low and locked with side rails adjusted as appropriate  - Keep care items and personal belongings within reach  - Initiate and maintain comfort rounds  - Make Fall Risk Sign visible to staff  - Offer Toileting every 2 Hours, in advance of need  - Initiate/Maintain bed and chair alarm  - Obtain necessary fall risk management equipment: non skid socks  - Apply yellow socks and bracelet for high fall risk patients  - Consider moving patient to room near nurses station  Outcome: Progressing     Problem: RESPIRATORY - ADULT  Goal: Achieves optimal ventilation and oxygenation  Description: INTERVENTIONS:  - Assess for changes in respiratory status  - Assess for changes in mentation and behavior  - Position to facilitate oxygenation and minimize respiratory effort  - Oxygen administered by appropriate delivery if ordered  - Initiate smoking cessation education as indicated  - Encourage broncho-pulmonary hygiene including cough, deep breathe, Incentive Spirometry  - Assess the need for suctioning and aspirate as needed  - Assess and instruct to report SOB or any respiratory difficulty  - Respiratory Therapy support as indicated  Outcome: Progressing     Problem: Prexisting or High Potential for Compromised Skin Integrity  Goal: Skin integrity is maintained or improved  Description: INTERVENTIONS:  - Identify patients at risk for skin breakdown  - Assess and monitor skin integrity  - Assess and monitor nutrition and hydration status  - Monitor labs   - Assess for incontinence   - Turn and reposition patient  - Assist with mobility/ambulation  - Relieve pressure over bony prominences  - Avoid  friction and shearing  - Provide appropriate hygiene as needed including keeping skin clean and dry  - Evaluate need for skin moisturizer/barrier cream  - Collaborate with interdisciplinary team   - Patient/family teaching  - Consider wound care consult   Outcome: Progressing     Problem: PAIN - ADULT  Goal: Verbalizes/displays adequate comfort level or baseline comfort level  Description: Interventions:  - Encourage patient to monitor pain and request assistance  - Assess pain using appropriate pain scale  - Administer analgesics based on type and severity of pain and evaluate response  - Implement non-pharmacological measures as appropriate and evaluate response  - Consider cultural and social influences on pain and pain management  - Notify physician/advanced practitioner if interventions unsuccessful or patient reports new pain  Outcome: Progressing     Problem: INFECTION - ADULT  Goal: Absence or prevention of progression during hospitalization  Description: INTERVENTIONS:  - Assess and monitor for signs and symptoms of infection  - Monitor lab/diagnostic results  - Monitor all insertion sites, i.e. indwelling lines, tubes, and drains  - Monitor endotracheal if appropriate and nasal secretions for changes in amount and color  - [place] appropriate cooling/warming therapies per order  - Administer medications as ordered  - Instruct and encourage patient and family to use good hand hygiene technique  - Identify and instruct in appropriate isolation precautions for identified infection/condition  Outcome: Progressing  Goal: Absence of fever/infection during neutropenic period  Description: INTERVENTIONS:  - Monitor WBC    Outcome: Progressing     Problem: SAFETY ADULT  Goal: Patient will remain free of falls  Description: INTERVENTIONS:  - Educate patient/family on patient safety including physical limitations  - Instruct patient to call for assistance with activity   - Consult OT/PT to assist with  strengthening/mobility   - Keep Call bell within reach  - Keep bed low and locked with side rails adjusted as appropriate  - Keep care items and personal belongings within reach  - Initiate and maintain comfort rounds  - Make Fall Risk Sign visible to staff  - Offer Toileting every 2 Hours, in advance of need  - Initiate/Maintain bed and chair alarm  - Obtain necessary fall risk management equipment: non skid socks  - Apply yellow socks and bracelet for high fall risk patients  - Consider moving patient to room near nurses station  Outcome: Progressing  Goal: Maintain or return to baseline ADL function  Description: INTERVENTIONS:  -  Assess patient's ability to carry out ADLs; assess patient's baseline for ADL function and identify physical deficits which impact ability to perform ADLs (bathing, care of mouth/teeth, toileting, grooming, dressing, etc.)  - Assess/evaluate cause of self-care deficits   - Assess range of motion  - Assess patient's mobility; develop plan if impaired  - Assess patient's need for assistive devices and provide as appropriate  - Encourage maximum independence but intervene and supervise when necessary  - Involve family in performance of ADLs  - Assess for home care needs following discharge   - Consider OT consult to assist with ADL evaluation and planning for discharge  - Provide patient education as appropriate  Outcome: Progressing  Goal: Maintains/Returns to pre admission functional level  Description: INTERVENTIONS:  - Perform AM-PAC 6 Click Basic Mobility/ Daily Activity assessment daily.  - Set and communicate daily mobility goal to care team and patient/family/caregiver.   - Collaborate with rehabilitation services on mobility goals if consulted  - Perform Range of Motion 4 times a day.  - Reposition patient every 2 hours.  - Dangle patient 4 times a day  - Stand patient 3 times a day  - Ambulate patient 3 times a day  - Out of bed to chair 3 times a day   - Out of bed for meals 3  times a day  - Out of bed for toileting  - Record patient progress and toleration of activity level   Outcome: Progressing     Problem: DISCHARGE PLANNING  Goal: Discharge to home or other facility with appropriate resources  Description: INTERVENTIONS:  - Identify barriers to discharge w/patient and caregiver  - Arrange for needed discharge resources and transportation as appropriate  - Identify discharge learning needs (meds, wound care, etc.)  - Arrange for interpretive services to assist at discharge as needed  - Refer to Case Management Department for coordinating discharge planning if the patient needs post-hospital services based on physician/advanced practitioner order or complex needs related to functional status, cognitive ability, or social support system  Outcome: Progressing     Problem: Knowledge Deficit  Goal: Patient/family/caregiver demonstrates understanding of disease process, treatment plan, medications, and discharge instructions  Description: Complete learning assessment and assess knowledge base.  Interventions:  - Provide teaching at level of understanding  - Provide teaching via preferred learning methods  Outcome: Progressing     Problem: MOBILITY - ADULT  Goal: Maintain or return to baseline ADL function  Description: INTERVENTIONS:  -  Assess patient's ability to carry out ADLs; assess patient's baseline for ADL function and identify physical deficits which impact ability to perform ADLs (bathing, care of mouth/teeth, toileting, grooming, dressing, etc.)  - Assess/evaluate cause of self-care deficits   - Assess range of motion  - Assess patient's mobility; develop plan if impaired  - Assess patient's need for assistive devices and provide as appropriate  - Encourage maximum independence but intervene and supervise when necessary  - Involve family in performance of ADLs  - Assess for home care needs following discharge   - Consider OT consult to assist with ADL evaluation and planning for  discharge  - Provide patient education as appropriate  Outcome: Progressing  Goal: Maintains/Returns to pre admission functional level  Description: INTERVENTIONS:  - Perform AM-PAC 6 Click Basic Mobility/ Daily Activity assessment daily.  - Set and communicate daily mobility goal to care team and patient/family/caregiver.   - Collaborate with rehabilitation services on mobility goals if consulted  - Perform Range of Motion 4 times a day.  - Reposition patient every 2 hours.  - Dangle patient 4 times a day  - Stand patient 4 times a day  - Ambulate patient 4 times a day  - Out of bed to chair 3 times a day   - Out of bed for meals 3 times a day  - Out of bed for toileting  - Record patient progress and toleration of activity level   Outcome: Progressing

## 2024-02-19 LAB
ANION GAP SERPL CALCULATED.3IONS-SCNC: 6 MMOL/L
APTT PPP: 46 SECONDS (ref 23–37)
APTT PPP: 46 SECONDS (ref 23–37)
APTT PPP: 92 SECONDS (ref 23–37)
BACTERIA BLD CULT: ABNORMAL
BACTERIA BLD CULT: ABNORMAL
BASOPHILS # BLD AUTO: 0 THOUSANDS/ÂΜL (ref 0–0.1)
BASOPHILS NFR BLD AUTO: 0 % (ref 0–1)
BUN SERPL-MCNC: 27 MG/DL (ref 5–25)
CALCIUM SERPL-MCNC: 8.6 MG/DL (ref 8.4–10.2)
CHLORIDE SERPL-SCNC: 108 MMOL/L (ref 96–108)
CO2 SERPL-SCNC: 31 MMOL/L (ref 21–32)
CREAT SERPL-MCNC: 0.79 MG/DL (ref 0.6–1.3)
EOSINOPHIL # BLD AUTO: 0 THOUSAND/ÂΜL (ref 0–0.61)
EOSINOPHIL NFR BLD AUTO: 0 % (ref 0–6)
ERYTHROCYTE [DISTWIDTH] IN BLOOD BY AUTOMATED COUNT: 12.1 % (ref 11.6–15.1)
GFR SERPL CREATININE-BSD FRML MDRD: 72 ML/MIN/1.73SQ M
GLUCOSE SERPL-MCNC: 154 MG/DL (ref 65–140)
GRAM STN SPEC: ABNORMAL
GRAM STN SPEC: ABNORMAL
HCT VFR BLD AUTO: 32.6 % (ref 34.8–46.1)
HGB BLD-MCNC: 10.5 G/DL (ref 11.5–15.4)
IMM GRANULOCYTES # BLD AUTO: 0.02 THOUSAND/UL (ref 0–0.2)
IMM GRANULOCYTES NFR BLD AUTO: 1 % (ref 0–2)
LYMPHOCYTES # BLD AUTO: 0.55 THOUSANDS/ÂΜL (ref 0.6–4.47)
LYMPHOCYTES NFR BLD AUTO: 16 % (ref 14–44)
MCH RBC QN AUTO: 30.6 PG (ref 26.8–34.3)
MCHC RBC AUTO-ENTMCNC: 32.2 G/DL (ref 31.4–37.4)
MCV RBC AUTO: 95 FL (ref 82–98)
MECA+MECC ISLT/SPM QL: DETECTED
MONOCYTES # BLD AUTO: 0.23 THOUSAND/ÂΜL (ref 0.17–1.22)
MONOCYTES NFR BLD AUTO: 7 % (ref 4–12)
NEUTROPHILS # BLD AUTO: 2.58 THOUSANDS/ÂΜL (ref 1.85–7.62)
NEUTS SEG NFR BLD AUTO: 76 % (ref 43–75)
NRBC BLD AUTO-RTO: 0 /100 WBCS
PLATELET # BLD AUTO: 172 THOUSANDS/UL (ref 149–390)
PMV BLD AUTO: 11.1 FL (ref 8.9–12.7)
POTASSIUM SERPL-SCNC: 3.8 MMOL/L (ref 3.5–5.3)
RBC # BLD AUTO: 3.43 MILLION/UL (ref 3.81–5.12)
S EPIDERMIDIS DNA BLD POS QL NAA+NON-PRB: DETECTED
SODIUM SERPL-SCNC: 145 MMOL/L (ref 135–147)
WBC # BLD AUTO: 3.38 THOUSAND/UL (ref 4.31–10.16)

## 2024-02-19 PROCEDURE — 85025 COMPLETE CBC W/AUTO DIFF WBC: CPT | Performed by: INTERNAL MEDICINE

## 2024-02-19 PROCEDURE — 94664 DEMO&/EVAL PT USE INHALER: CPT

## 2024-02-19 PROCEDURE — 99233 SBSQ HOSP IP/OBS HIGH 50: CPT | Performed by: INTERNAL MEDICINE

## 2024-02-19 PROCEDURE — 94640 AIRWAY INHALATION TREATMENT: CPT

## 2024-02-19 PROCEDURE — 85730 THROMBOPLASTIN TIME PARTIAL: CPT | Performed by: INTERNAL MEDICINE

## 2024-02-19 PROCEDURE — 99233 SBSQ HOSP IP/OBS HIGH 50: CPT

## 2024-02-19 PROCEDURE — 94760 N-INVAS EAR/PLS OXIMETRY 1: CPT

## 2024-02-19 PROCEDURE — 80048 BASIC METABOLIC PNL TOTAL CA: CPT | Performed by: INTERNAL MEDICINE

## 2024-02-19 RX ORDER — MIDODRINE HYDROCHLORIDE 5 MG/1
5 TABLET ORAL
Status: DISCONTINUED | OUTPATIENT
Start: 2024-02-20 | End: 2024-02-21

## 2024-02-19 RX ADMIN — FUROSEMIDE 40 MG: 10 INJECTION, SOLUTION INTRAMUSCULAR; INTRAVENOUS at 17:06

## 2024-02-19 RX ADMIN — DOCUSATE SODIUM 100 MG: 100 CAPSULE, LIQUID FILLED ORAL at 08:01

## 2024-02-19 RX ADMIN — IPRATROPIUM BROMIDE 0.5 MG: 0.5 SOLUTION RESPIRATORY (INHALATION) at 20:10

## 2024-02-19 RX ADMIN — HEPARIN SODIUM 1650 UNITS: 1000 INJECTION INTRAVENOUS; SUBCUTANEOUS at 17:11

## 2024-02-19 RX ADMIN — ZANUBRUTINIB 160 MG: 80 CAPSULE, GELATIN COATED ORAL at 08:03

## 2024-02-19 RX ADMIN — MIDODRINE HYDROCHLORIDE 10 MG: 5 TABLET ORAL at 11:42

## 2024-02-19 RX ADMIN — BUDESONIDE AND FORMOTEROL FUMARATE DIHYDRATE 2 PUFF: 160; 4.5 AEROSOL RESPIRATORY (INHALATION) at 17:07

## 2024-02-19 RX ADMIN — ACETAMINOPHEN 975 MG: 325 TABLET, FILM COATED ORAL at 20:34

## 2024-02-19 RX ADMIN — LEVALBUTEROL HYDROCHLORIDE 1.25 MG: 1.25 SOLUTION RESPIRATORY (INHALATION) at 07:01

## 2024-02-19 RX ADMIN — ASPIRIN 81 MG: 81 TABLET, COATED ORAL at 08:01

## 2024-02-19 RX ADMIN — REMDESIVIR 100 MG: 100 INJECTION, POWDER, LYOPHILIZED, FOR SOLUTION INTRAVENOUS at 20:20

## 2024-02-19 RX ADMIN — BUPROPION HYDROCHLORIDE 75 MG: 75 TABLET, FILM COATED ORAL at 08:01

## 2024-02-19 RX ADMIN — BUDESONIDE AND FORMOTEROL FUMARATE DIHYDRATE 2 PUFF: 160; 4.5 AEROSOL RESPIRATORY (INHALATION) at 08:04

## 2024-02-19 RX ADMIN — CARVEDILOL 25 MG: 12.5 TABLET, FILM COATED ORAL at 17:05

## 2024-02-19 RX ADMIN — ALBUTEROL SULFATE 2 PUFF: 90 AEROSOL, METERED RESPIRATORY (INHALATION) at 12:55

## 2024-02-19 RX ADMIN — Medication 3 MG: at 20:48

## 2024-02-19 RX ADMIN — LEVALBUTEROL HYDROCHLORIDE 1.25 MG: 1.25 SOLUTION RESPIRATORY (INHALATION) at 20:10

## 2024-02-19 RX ADMIN — SODIUM CHLORIDE 75 ML/HR: 0.9 INJECTION, SOLUTION INTRAVENOUS at 05:21

## 2024-02-19 RX ADMIN — VILAZODONE HYDROCHLORIDE 40 MG: 20 TABLET ORAL at 08:04

## 2024-02-19 RX ADMIN — BARICITINIB 2 MG: 2 TABLET, FILM COATED ORAL at 08:03

## 2024-02-19 RX ADMIN — PRAVASTATIN SODIUM 80 MG: 80 TABLET ORAL at 17:05

## 2024-02-19 RX ADMIN — CARVEDILOL 25 MG: 12.5 TABLET, FILM COATED ORAL at 08:01

## 2024-02-19 RX ADMIN — ALBUTEROL SULFATE 2.5 MG: 2.5 SOLUTION RESPIRATORY (INHALATION) at 16:17

## 2024-02-19 RX ADMIN — HEPARIN SODIUM 11.1 UNITS/KG/HR: 10000 INJECTION, SOLUTION INTRAVENOUS at 20:34

## 2024-02-19 RX ADMIN — ZANUBRUTINIB 160 MG: 80 CAPSULE, GELATIN COATED ORAL at 17:12

## 2024-02-19 RX ADMIN — IPRATROPIUM BROMIDE 0.5 MG: 0.5 SOLUTION RESPIRATORY (INHALATION) at 07:01

## 2024-02-19 RX ADMIN — FUROSEMIDE 40 MG: 10 INJECTION, SOLUTION INTRAMUSCULAR; INTRAVENOUS at 08:01

## 2024-02-19 RX ADMIN — PANTOPRAZOLE SODIUM 40 MG: 40 TABLET, DELAYED RELEASE ORAL at 08:01

## 2024-02-19 RX ADMIN — DEXAMETHASONE SODIUM PHOSPHATE 6 MG: 10 INJECTION, SOLUTION INTRAMUSCULAR; INTRAVENOUS at 20:48

## 2024-02-19 RX ADMIN — MIDODRINE HYDROCHLORIDE 10 MG: 5 TABLET ORAL at 08:01

## 2024-02-19 NOTE — CASE MANAGEMENT
Case Management Assessment & Discharge Planning Note    Patient name Lauren Quintero  Location /-01 MRN [id number]  : 1945 Date 2024       Current Admission Date: 2/15/2024  Current Admission Diagnosis:NSTEMI (non-ST elevated myocardial infarction) (HCC)   Patient Active Problem List    Diagnosis Date Noted    NSTEMI (non-ST elevated myocardial infarction) (HCC) 02/15/2024    COVID-19 02/15/2024    Dizziness 2023    Social problem 2023    Acute on chronic respiratory failure with hypoxia and hypercapnia (HCC) 2023    Diverticulitis 10/29/2023    Moderate protein-calorie malnutrition (HCC) 2023    BRBPR (bright red blood per rectum) 2023    Generalized weakness 2023    Staring episodes 2023    Waldenstrom macroglobulinemia (HCC) 2023    Severe protein-calorie malnutrition (HCC) 2021    Positive blood culture 2021    COPD without exacerbation (HCC) 2021    Anemia 2021    Abnormal computed tomography angiography (CTA) 2021    Fatigue 2021    Major depressive disorder, recurrent episode, moderate (HCC) 2019    Flank pain 2019    Pneumonia 2019    Sepsis (HCC) 2019    CAD (coronary artery disease), native coronary artery 2018    Acute on chronic respiratory failure with hypoxia (HCC) 2018    Ambulatory dysfunction 2018    Hypokalemia 2018    Essential hypertension 2018    Tobacco abuse 2018      LOS (days): 4  Geometric Mean LOS (GMLOS) (days): 5.1  Days to GMLOS:1.3     OBJECTIVE:    Risk of Unplanned Readmission Score: 48.42         Current admission status: Inpatient       Preferred Pharmacy:   [facility]/pharmacy #[id number] - [place] - [address]  [address]  [place] [address]  Phone: [phone] Fax: [phone]    [facility] - [place] - [address]  [address]  [address]  [place] [address]  Phone:  [phone] Fax: [phone]    Primary Care Provider: Starla Bateman MD    Primary Insurance: MEDICARE  Secondary Insurance:     ASSESSMENT:  Active Health Care Proxies       Imer Quintero Health Care Agent - Son   Primary Phone: [phone] (Home)                 Advance Directives  Does patient have a Health Care POA?: Yes  Does patient have Advance Directives?: Yes  Advance Directives: Living will, Power of  for health care  Primary Contact: fadia Willams         Readmission Root Cause  30 Day Readmission: No    Patient Information  Admitted from:: Home (pt was on her way home from a STR in [facility])  Mental Status: Alert  During Assessment patient was accompanied by: Not accompanied during assessment  Assessment information provided by:: Patient, Son (pt and son Imer via phone)  Primary Caregiver: Family  Caregiver's Name:: Imer  Caregiver's Relationship to Patient:: Family Member  Caregiver's Telephone Number:: [phone]  Support Systems: Son  County of Residence: [place]  What city do you live in?: [place]  Home entry access options. Select all that apply.: Stairs  Number of steps to enter home.: 4  Do the steps have railings?: Yes  Type of Current Residence: [facility]  Living Arrangements: Lives w/ Son  Is patient a ?: No    Activities of Daily Living Prior to Admission  Completes ADLs independently?: No  Level of ADL dependence: Assistance  Ambulates independently?: No  Level of ambulatory dependence: Assistance  Does patient use assisted devices?: Yes  Assisted Devices (DME) used: Home Oxygen concentrator, Walker, Wheelchair, Shower Chair  DME Company Name (respiratory supplies): pt unsure of the company  O2 Rate(s): 2lpm-but pt is currently on 5lpm at the hospital  Does patient currently own DME?: Yes  What DME does the patient currently own?: Wheelchair, Shower Chair, Walker  Does patient have a history of Outpatient Therapy (PT/OT)?: No  Does the patient have a history of  Short-Term Rehab?: Yes ([facility] and [facility])  Does patient have a history of HHC?: Yes ([facility])  Does patient currently have HHC?: No         Patient Information Continued  Income Source: SSI/SSD  Does patient have prescription coverage?: Yes  Does patient receive dialysis treatments?: Yes  Does patient have a history of substance abuse?: No  Does patient have a history of Mental Health Diagnosis?: Yes (MDD-was in a Adult Behavior Unit a few months ago per fadia Willams)  Is patient receiving treatment for mental health?: Yes  Has patient received inpatient treatment related to mental health in the last 2 years?: Yes    PHQ 2/9 Screening   Reviewed PHQ 2/9 Depression Screening Score?: No    Means of Transportation  Means of Transport to Appts:: Family transport      Social Determinants of Health (SDOH)      Flowsheet Row Most Recent Value   Housing Stability    In the last 12 months, was there a time when you were not able to pay the mortgage or rent on time? N   In the last 12 months, how many places have you lived? 2   In the last 12 months, was there a time when you did not have a steady place to sleep or slept in a shelter (including now)? N   Transportation Needs    In the past 12 months, has lack of transportation kept you from medical appointments or from getting medications? no   In the past 12 months, has lack of transportation kept you from meetings, work, or from getting things needed for daily living? No   Food Insecurity    Within the past 12 months, you worried that your food would run out before you got the money to buy more. Never true   Within the past 12 months, the food you bought just didn't last and you didn't have money to get more. Never true   Utilities    In the past 12 months has the electric, gas, oil, or water company threatened to shut off services in your home? No            DISCHARGE DETAILS:    Discharge planning discussed with:: pt and son Imer  [place] of Choice:  Yes  Comments - [place] of Choice: CM discussed dcp with pt and son Imer.  Pt feels she needs STR, but son Imer does not think she has bed days left.  CM will place referral to [facility] where she has been 2x in the past and to see if pt has any remaining bed days left.  If she does, pt will have to be optioned due to a recent adult behavior health admission  CM contacted family/caregiver?: Yes  Were Treatment Team discharge recommendations reviewed with patient/caregiver?: Yes  Did patient/caregiver verbalize understanding of patient care needs?: Yes  Were patient/caregiver advised of the risks associated with not following Treatment Team discharge recommendations?: Yes    Contacts  Patient Contacts: Imer  Relationship to Patient:: Family  Contact Method: Phone  Phone Number: [phone]  Reason/Outcome: Continuity of Care, Discharge Planning    Requested Home Health Care         Is the patient interested in HHC at discharge?: No    DME Referral Provided  Referral made for DME?: No    Other Referral/Resources/Interventions Provided:  Interventions: Short Term Rehab  Referral Comments: [facility] pended, but unsure if bed days are remaining    Would you like to participate in our [facility] service program?  : No - Declined    Treatment Team Recommendation: Short Term Rehab  Discharge Destination Plan:: Short Term Rehab  Transport at Discharge : BLS Ambulance

## 2024-02-19 NOTE — PROGRESS NOTES
Cardiology Progress Note - Lauren Quintero 78 y.o. female MRN: [id number]    Unit/Bed#: -01 Encounter: [id number]      Assessment/Plan:   Sepsis/COVID-19  2.  Acute on chronic hypoxic respiratory failure  3.  NSTEMI type I  Troponins peak 4751.  EKG with anterolateral T wave inversions concerning's for Wellens syndrome.  TTE shows new cardiomyopathy with EF 35% and wall motion abnormalities per #5.  Continue heparin drip, aspirin, statin, Coreg.   Plan for cardiac catheterization, will hold off at this time due to active COVID-19 infection.  Catheterization likely later this week pending further treatment for COVID-19.    4.  [facility]  See #2  Continue aspirin, statin, Coreg.    5.  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  Echo : 2/18/2024: EF 35%, mildly increased LV wall thickness, mild asymmetric hypertrophy of the basal septal wall, LV systolic function severely reduced, grade 1 diastolic dysfunction.  Hypokinetic mid anteroseptal, mid inferior septal, apical anterior, apical septal, apical inferior, and apex segments.  Pulmonary artery systolic pressure mildly increased at 40.0 mg mercury.  Appears mild hypervolemic on exam.  See #6.  Medication Regiment Includes:   GDMT:   Beta Blocker: Continue Coreg 25 mg twice daily  Plan for further optimization of GDMT following cardiac catheterization.  Patient will need consideration for LifeVest prior to discharge.     6. Acute on chronic HFrEF  Etiology: New cardiomyopathy of undetermined etiology per #5.  Appears mildly hypervolemic on exam  Medication Regiment Includes:   Diuretic: Continue Lasix 40 mg IV twice daily.  GDMT:   Beta Blocker: Continue Coreg 25 mg twice daily  Plan for further optimization of GDMT following cardiac catheterization.  Patient will need consideration for LifeVest prior to discharge.  Recommend low-sodium diet, strict I's and O's, daily weights.  Continue to monitor  "and replete electrolytes as needed.    7.  PSVT  Noted on telemetry.  Continue Coreg.    8. COPD with chronic hypoxic respiratory failure  9.  Hypertension  Decrease midodrine to 5 mg 3 times daily  Continue Coreg, Lasix 40 mg IV twice daily.    10.  Hyperlipidemia  Continue statin  11.  CKD  12.  Impaired fasting glucose.    Subjective:   Patient seen and examined.  She states her shortness of breath is improving.  She offers no further cardiac complaints.  She denies chest pain/pressure, palpitations, lightheadedness, dizziness, syncope, lower extremity edema.      Objective:     Vitals: Blood pressure 134/73, pulse 68, temperature 97.6 °F (36.4 °C), temperature source Oral, resp. rate 20, height 5' 8\" (1.727 m), weight 59.9 kg (132 lb), SpO2 98%, not currently breastfeeding., Body mass index is 20.07 kg/m².,   Orthostatic Blood Pressures      Flowsheet Row Most Recent Value   Blood Pressure 134/73 filed at 02/19/2024 0700   Patient Position - Orthostatic VS Lying filed at 02/19/2024 0700            No intake or output data in the 24 hours ending 02/19/24 1023      Physical Exam:   GEN: Alert and oriented x 3, in no acute distress.   HEENT: Sclera anicteric, conjunctivae pink, mucous membranes moist. Oropharynx clear.   NECK: Supple, no significant JVD. Trachea midline, no thyromegaly.   HEART: Regular rhythm, normal S1 and S2, no murmurs, clicks, gallops or rubs. PMI nondisplaced, no thrills.   LUNGS: On 4 L O2.  Bilateral wheezing/rhonchi to auscultation. Faint B/L crackles to auscultation. No increased work of breathing or signs of respiratory distress.   ABDOMEN: Soft, nontender, non-distended.   EXTREMITIES: Skin warm and well perfused, no clubbing, cyanosis, or edema.  NEURO: No focal findings. Normal speech. Mood and affect normal.   SKIN: Normal without suspicious lesions on exposed skin.      Telemetry:  Telemetry Orders (From admission, onward)               24 Hour Telemetry Monitoring  Continuous x 24 " Hours (Telem)        Expiring   Question:  Reason for 24 Hour Telemetry  Answer:  PCI/EP study (including pacer and ICD implementation), Cardiac surgery, MI, abnormal cardiac cath, and chest pain- rule out MI                     Telemetry Reviewed:  Sinus, occasional runs of SVT up to 12 beats noted.    Medications:      Current Facility-Administered Medications:     acetaminophen (TYLENOL) tablet 975 mg, 975 mg, Oral, Q8H PRN, Stephanie Day PA-C    albuterol (PROVENTIL HFA,VENTOLIN HFA) inhaler 2 puff, 2 puff, Inhalation, Q4H PRN, Jose Parameswaran, DO, 2 puff at 02/17/24 1858    albuterol inhalation solution 2.5 mg, 2.5 mg, Nebulization, Q4H PRN, TAMMIE Christensen, 2.5 mg at 02/18/24 1542    aluminum-magnesium hydroxide-simethicone (MAALOX) oral suspension 30 mL, 30 mL, Oral, Q6H PRN, Jose Parameswaran, DO    aspirin (ECOTRIN LOW STRENGTH) EC tablet 81 mg, 81 mg, Oral, Daily, Jose Parameswaran, DO, 81 mg at 02/19/24 0801    baricitinib (OLUMIANT) tablet 2 mg, 2 mg, Oral, Q24H, Kenny Stone MD, 2 mg at 02/19/24 0803    budesonide-formoterol (SYMBICORT) 160-4.5 mcg/act inhaler 2 puff, 2 puff, Inhalation, BID, Jose Parameswaran, DO, 2 puff at 02/19/24 0804    buPROPion (WELLBUTRIN) tablet 75 mg, 75 mg, Oral, Daily, Jose Parameswaran, DO, 75 mg at 02/19/24 0801    carvedilol (COREG) tablet 25 mg, 25 mg, Oral, BID With Meals, Jose Parameswaran, DO, 25 mg at 02/19/24 0801    dexamethasone (PF) (DECADRON) injection 6 mg, 6 mg, Intravenous, Q24H, Joes Parameswaran, DO, 6 mg at 02/18/24 2201    dextromethorphan-guaiFENesin (ROBITUSSIN DM) oral syrup 10 mL, 10 mL, Oral, Q6H PRN, Stephanie Day PA-C, 10 mL at 02/18/24 2210    docusate sodium (COLACE) capsule 100 mg, 100 mg, Oral, BID, Jose Shelton DO, 100 mg at 02/19/24 0801    furosemide (LASIX) injection 40 mg, 40 mg, Intravenous, BID (diuretic), Mio Montgomery PA-C, 40 mg at 02/19/24 0801    heparin (porcine) 25,000 units in 0.45% NaCl  250 mL infusion (premix), 3-20 Units/kg/hr (Order-Specific), Intravenous, Titrated, Jose Parameswaran, DO, Last Rate: 5 mL/hr at 02/19/24 0635, 9.1 Units/kg/hr at 02/19/24 0635    heparin (porcine) injection 1,650 Units, 1,650 Units, Intravenous, Q6H PRN, Jose Parameswaran, DO, 1,650 Units at 02/17/24 0624    heparin (porcine) injection 3,300 Units, 3,300 Units, Intravenous, Q6H PRN, Jose Parameswaran, DO    ipratropium (ATROVENT) 0.02 % inhalation solution 0.5 mg, 0.5 mg, Nebulization, BID, Jose Parameswaran, DO, 0.5 mg at 02/19/24 0701    labetalol (NORMODYNE) injection 10 mg, 10 mg, Intravenous, Q6H PRN, Jose Parameswaran, DO    levalbuterol (XOPENEX) inhalation solution 1.25 mg, 1.25 mg, Nebulization, BID, Jose Parameswaran, DO, 1.25 mg at 02/19/24 0701    lidocaine (LIDODERM) 5 % patch 2 patch, 2 patch, Topical, Daily PRN, Stephanie Day PA-C, 2 patch at 02/17/24 0031    melatonin tablet 3 mg, 3 mg, Oral, HS PRN, TAMMIE Nunez, 3 mg at 02/18/24 2319    midodrine (PROAMATINE) tablet 10 mg, 10 mg, Oral, TID AC, Kenny Stone MD, 10 mg at 02/19/24 0801    nitroglycerin (NITROSTAT) SL tablet 0.4 mg, 0.4 mg, Sublingual, Q5 Min PRN, Jose Parameswaran, DO, 0.4 mg at 02/17/24 0132    ondansetron (ZOFRAN) injection 4 mg, 4 mg, Intravenous, Q6H PRN, Jose Parameswaran, DO    pantoprazole (PROTONIX) EC tablet 40 mg, 40 mg, Oral, Daily, Jose Parameswaran, DO, 40 mg at 02/19/24 0801    pravastatin (PRAVACHOL) tablet 80 mg, 80 mg, Oral, QPM, Jose Shelton DO, 80 mg at 02/18/24 1741    [COMPLETED] remdesivir (Veklury) 200 mg in sodium chloride 0.9 % 290 mL IVPB, 200 mg, Intravenous, Q24H, Stopped at 02/15/24 2224 **FOLLOWED BY** remdesivir (Veklury) 100 mg in sodium chloride 0.9 % 270 mL IVPB, 100 mg, Intravenous, Q24H, Jose Shelton DO, 100 mg at 02/18/24 2019    vilazodone (VIIBRYD) tablet 40 mg, 40 mg, Oral, Daily With Breakfast, Jose Shelton DO, 40 mg at 02/19/24  0804    Zanubrutinib CAPS 160 mg, 160 mg, Oral, BID, Kenny Stone MD, 160 mg at 02/19/24 0803     Labs & Results:    Results from last 7 days   Lab Units 02/17/24  0910 02/17/24  0656 02/17/24  0444 02/16/24  1309 02/16/24  1139 02/16/24  0921 02/15/24  2154 02/15/24  1956 02/15/24  1756   HS TNI 0HR ng/L  --   --  2,584*  --   --  3,874*  --   --  1,440*   HS TNI 2HR ng/L  --  2,049*  --   --  4,621*  --   --  3,047*  --    HS TNI 4HR ng/L 2,212*  --   --  4,751*  --   --  3,138*  --   --    HSTNI D4 ng/L -372  --   --  877*  --   --  1,698*  --   --      Results from last 7 days   Lab Units 02/19/24 0434 02/18/24 0448 02/17/24  0444   WBC Thousand/uL 3.38* 5.53 6.59   HEMOGLOBIN g/dL 10.5* 10.9* 10.5*   HEMATOCRIT % 32.6* 33.6* 32.2*   PLATELETS Thousands/uL 172 160 156     Results from last 7 days   Lab Units 02/16/24  0521   TRIGLYCERIDES mg/dL 143   HDL mg/dL 55     Results from last 7 days   Lab Units 02/19/24 0434 02/18/24 0448 02/17/24  0444 02/16/24  0521 02/15/24  1756   POTASSIUM mmol/L 3.8 3.9 3.6 4.2 4.0   CHLORIDE mmol/L 108 106 106 102 107   CO2 mmol/L 31 28 29 26 25   BUN mg/dL 27* 27* 24 15 14   CREATININE mg/dL 0.79 0.81 0.88 0.97 0.90   CALCIUM mg/dL 8.6 8.8 9.0 9.2 8.7   ALK PHOS U/L  --   --  57 79 70   ALT U/L  --   --  7 5* 3*   AST U/L  --   --  27 26 17     Results from last 7 days   Lab Units 02/19/24  0434 02/18/24  0448 02/17/24  1914 02/16/24  0206 02/15/24  1908   INR   --   --   --   --  1.03   PTT seconds 92* 86* 61*   < > 30    < > = values in this interval not displayed.     Results from last 7 days   Lab Units 02/16/24  0521   MAGNESIUM mg/dL 1.7*

## 2024-02-19 NOTE — PLAN OF CARE
Problem: Potential for Falls  Goal: Patient will remain free of falls  Description: INTERVENTIONS:  - Educate patient/family on patient safety including physical limitations  - Instruct patient to call for assistance with activity   - Consult OT/PT to assist with strengthening/mobility   - Keep Call bell within reach  - Keep bed low and locked with side rails adjusted as appropriate  - Keep care items and personal belongings within reach  - Initiate and maintain comfort rounds  - Make Fall Risk Sign visible to staff  - Offer Toileting every  Hours, in advance of need  - Initiate/Maintain alarm  - Obtain necessary fall risk management equipment:  - Apply yellow socks and bracelet for high fall risk patients  - Consider moving patient to room near nurses station  Outcome: Progressing

## 2024-02-19 NOTE — ASSESSMENT & PLAN NOTE
POA as evidenced by tachycardia, tachypnea  Sepsis source: COVID postiive, CT imaging showing signs of infection    Continue COVID treatment with baricitinib, dexamethasone, remdesivir  Continue to attempt wean oxygen  Prognosis overall guarded given age, comorbidities, poor functional status at baseline, discussed with patient and son in detail, both endorse DNR/DNI status.  Continue current treatment for now

## 2024-02-19 NOTE — ASSESSMENT & PLAN NOTE
Lab Results   Component Value Date    SARSCOV2 Positive (A) 02/15/2024    SARSCOV2 Negative 12/12/2023    SARSCOV2 Negative 10/28/2023       Symptoms of COVID-19 SYMPTOMS: shortness of breath and Tachycardia (more than 100 beats per min)  Vaccine status: vaccinated  The patient does have acute hypoxemic respiratory failure given the same    CT chest w contrast showing Scattered areas of tree-in-bud opacity consistent with endobronchial impaction/infection      Continue remdesevir for 5 days -plan to finish today, Dexamethasone for 10 days  Continue baricitinib given worsening oxygen requirement on 2/16  The patient is on therapeutic anticoagulation given the positive troponins  Continue to monitor and attempt wean oxygen  Patient requires continued inpatient stay  Patient likely will need repeat rehab stay after discharge her

## 2024-02-19 NOTE — ASSESSMENT & PLAN NOTE
Currently SpO2: 98 % on Nasal Cannula O2 Flow Rate (L/min): 5 L/min    At baseline, uses 4L NC  CT Imaging showing mild pulm edema, scattered areas of tree-in-bud opacity    Continue to treat underlying NSTEMI, COVID, mild pulm edema  Wean O2 as able\

## 2024-02-19 NOTE — PROGRESS NOTES
[facility]  Progress Note  Name: Lauren Quintero I  MRN: [id number]  Unit/Bed#: -01 I Date of Admission: 2/15/2024   Date of Service: 2/19/2024 I Hospital Day: 4    Assessment/Plan   COVID-19  Assessment & Plan  Lab Results   Component Value Date    SARSCOV2 Positive (A) 02/15/2024    SARSCOV2 Negative 12/12/2023    SARSCOV2 Negative 10/28/2023       Symptoms of COVID-19 SYMPTOMS: shortness of breath and Tachycardia (more than 100 beats per min)  Vaccine status: vaccinated  The patient does have acute hypoxemic respiratory failure given the same    CT chest w contrast showing Scattered areas of tree-in-bud opacity consistent with endobronchial impaction/infection      Continue remdesevir for 5 days -plan to finish today, Dexamethasone for 10 days  Continue baricitinib given worsening oxygen requirement on 2/16  The patient is on therapeutic anticoagulation given the positive troponins  Continue to monitor and attempt wean oxygen  Patient requires continued inpatient stay  Patient likely will need repeat rehab stay after discharge her    * NSTEMI (non-ST elevated myocardial infarction) (HCC)  Assessment & Plan  Lab Results   Component Value Date    HSTNI0 2,584 (H) 02/17/2024    HSTNI2 2,049 (H) 02/17/2024    HSTNID2 -535 02/17/2024      CT chest showing no evidence of pulmonary embolism, did note mild pulmonary edema  No chest pain present, but significant SOB, anginal equivalent, sudden onset, fairly elevated troponins  EKG: Sinus Tachycardia with no obvious STEMI  Suspect a true type I NSTEMI  ADRIEN Score: 5    The patient's presentation with shortness of breath was somewhat sudden according to the son, he was driving her home from the rehab facility if she got acutely short of breath and hypoxemic again.  Certainly it is possible that the COVID infection may have triggered some degree of underlying CAD leading to an NSTEMI.  Continue heparin infusion for now  Cardiology  following.  Initial plan for cardiac catheterization as per cardiology        COPD without exacerbation (HCC)  Assessment & Plan  Not wheezing but SOB due to COVID  Continue IV steroids for COVID    Major depressive disorder, recurrent episode, moderate (HCC)  Assessment & Plan  Continue wellbutrin, vilazodone  Also takes Vraylar; recommend bringing from home as we do not have it on formulary - patient now has it     Sepsis (HCC)  Assessment & Plan  POA as evidenced by tachycardia, tachypnea  Sepsis source: COVID postiive, CT imaging showing signs of infection    Continue COVID treatment with baricitinib, dexamethasone, remdesivir  Continue to attempt wean oxygen  Prognosis overall guarded given age, comorbidities, poor functional status at baseline, discussed with patient and son in detail, both endorse DNR/DNI status.  Continue current treatment for now      Acute on chronic respiratory failure with hypoxia (HCC)  Assessment & Plan  Currently SpO2: 98 % on Nasal Cannula O2 Flow Rate (L/min): 5 L/min    At baseline, uses 4L NC  CT Imaging showing mild pulm edema, scattered areas of tree-in-bud opacity    Continue to treat underlying NSTEMI, COVID, mild pulm edema  Wean O2 as able\      Essential hypertension  Assessment & Plan    Continue home meds; initiate lisinopril as part of NSTEMI management  Continue carvedilol  PRN IV Labetalol for SBP > 160  Monitor blood pressure                 VTE Pharmacologic Prophylaxis:   Pharmacologic: Heparin Drip  Mechanical VTE Prophylaxis in Place: Yes    Patient Centered Rounds: I have performed bedside rounds with nursing staff today.    Discussions with Specialists or Other Care Team Provider: Discussed with care management team    Education and Discussions with Family / Patient: Discussed with son over the phone    Time Spent for Care: 45 minutes.  More than 50% of total time spent on counseling and coordination of care as described above.    Current Length of Stay: 4  day(s)    Current Patient Status: Inpatient   Certification Statement: The patient will continue to require additional inpatient hospital stay due to need for IV treatments, cardiac workup    Discharge Plan: 48-72h    Code Status: Level 3 - DNAR and DNI      Subjective:     Patient valuated this morning.  She does mention feeling short of breath.  Somewhat anxious.  Denies any nausea or vomiting.    Objective:     Vitals:   Temp (24hrs), Av.6 °F (36.4 °C), Min:97.3 °F (36.3 °C), Max:98 °F (36.7 °C)    Temp:  [97.3 °F (36.3 °C)-98 °F (36.7 °C)] 97.5 °F (36.4 °C)  HR:  [68-76] 68  Resp:  [20] 20  BP: (122-187)/(65-78) 134/74  SpO2:  [94 %-98 %] 98 %  Body mass index is 20.07 kg/m².     Input and Output Summary (last 24 hours):       Intake/Output Summary (Last 24 hours) at 2024 1350  Last data filed at 2024 1101  Gross per 24 hour   Intake --   Output 1200 ml   Net -1200 ml       Physical Exam:     Physical Exam  Vitals and nursing note reviewed.   Constitutional:       Appearance: Normal appearance. She is normal weight.      Comments: Female in bed, awake   HENT:      Head: Normocephalic and atraumatic.      Right Ear: External ear normal.      Left Ear: External ear normal.      Nose: Nose normal. No congestion.      Mouth/Throat:      Mouth: Mucous membranes are moist.      Pharynx: Oropharynx is clear. No oropharyngeal exudate or posterior oropharyngeal erythema.   Eyes:      General: No scleral icterus.        Right eye: No discharge.         Left eye: No discharge.      Extraocular Movements: Extraocular movements intact.      Conjunctiva/sclera: Conjunctivae normal.      Pupils: Pupils are equal, round, and reactive to light.   Cardiovascular:      Rate and Rhythm: Normal rate and regular rhythm.      Pulses: Normal pulses.      Heart sounds: Normal heart sounds. No murmur heard.     No friction rub. No gallop.   Pulmonary:      Effort: Pulmonary effort is normal. No respiratory distress.       Breath sounds: Normal breath sounds. No stridor. No wheezing, rhonchi or rales.      Comments: On oxygen cannula  Chest:      Chest wall: No tenderness.   Abdominal:      General: Abdomen is flat. Bowel sounds are normal. There is no distension.      Palpations: Abdomen is soft. There is no mass.      Tenderness: There is no abdominal tenderness. There is no guarding or rebound.   Musculoskeletal:         General: No swelling, tenderness, deformity or signs of injury. Normal range of motion.      Cervical back: Normal range of motion and neck supple. No rigidity. No muscular tenderness.   Skin:     General: Skin is warm and dry.      Capillary Refill: Capillary refill takes less than 2 seconds.      Coloration: Skin is not jaundiced or pale.      Findings: No bruising, erythema, lesion or rash.   Neurological:      General: No focal deficit present.      Mental Status: She is alert and oriented to person, place, and time. Mental status is at baseline.      Cranial Nerves: No cranial nerve deficit.      Sensory: No sensory deficit.      Motor: No weakness.      Coordination: Coordination normal.   Psychiatric:      Comments: Overall anxious today           Additional Data:     Labs:    Results from last 7 days   Lab Units 02/19/24  0434   WBC Thousand/uL 3.38*   HEMOGLOBIN g/dL 10.5*   HEMATOCRIT % 32.6*   PLATELETS Thousands/uL 172   NEUTROS PCT % 76*   LYMPHS PCT % 16   MONOS PCT % 7   EOS PCT % 0     Results from last 7 days   Lab Units 02/19/24  0434 02/18/24  0448 02/17/24  0444   SODIUM mmol/L 145   < > 143   POTASSIUM mmol/L 3.8   < > 3.6   CHLORIDE mmol/L 108   < > 106   CO2 mmol/L 31   < > 29   BUN mg/dL 27*   < > 24   CREATININE mg/dL 0.79   < > 0.88   ANION GAP mmol/L 6   < > 8   CALCIUM mg/dL 8.6   < > 9.0   ALBUMIN g/dL  --   --  4.2   TOTAL BILIRUBIN mg/dL  --   --  0.40   ALK PHOS U/L  --   --  57   ALT U/L  --   --  7   AST U/L  --   --  27   GLUCOSE RANDOM mg/dL 154*   < > 138    < > = values in this  interval not displayed.     Results from last 7 days   Lab Units 02/15/24  1908   INR  1.03         Results from last 7 days   Lab Units 02/15/24  1756   HEMOGLOBIN A1C % 5.3     Results from last 7 days   Lab Units 02/16/24  0521 02/15/24  2030 02/15/24  1756   LACTIC ACID mmol/L  --  0.8  --    PROCALCITONIN ng/ml 0.11  --  <0.05           * I Have Reviewed All Lab Data Listed Above.  * Additional Pertinent Lab Tests Reviewed: All Labs For Current Hospital Admission Reviewed      Recent Cultures (last 7 days):     Results from last 7 days   Lab Units 02/15/24  2030   BLOOD CULTURE  Staphylococcus pettenkoferi*  Staphylococcus epidermidis*   GRAM STAIN RESULT  Gram positive cocci in clusters*  Gram positive cocci in clusters*       Last 24 Hours Medication List:   Current Facility-Administered Medications   Medication Dose Route Frequency Provider Last Rate    acetaminophen  975 mg Oral Q8H PRN Stephanie Day PA-C      albuterol  2 puff Inhalation Q4H PRN [facility], DO      albuterol  2.5 mg Nebulization Q4H PRN TAMMIE Christensen      aluminum-magnesium hydroxide-simethicone  30 mL Oral Q6H PRN [facility] Parameswaran, DO      aspirin  81 mg Oral Daily [facility], [facility]      baricitinib  2 mg Oral Q24H Kenny Stone MD      budesonide-formoterol  2 puff Inhalation BID [facility] Parameswaran, DO      buPROPion  75 mg Oral Daily [facility]      carvedilol  25 mg Oral BID With Meals [facility], DO      dexamethasone  6 mg Intravenous Q24H [facility] Parameswaran, DO      dextromethorphan-guaiFENesin  10 mL Oral Q6H PRN Stephanie Day PA-C      docusate sodium  100 mg Oral BID [facility] Parameswaran, DO      furosemide  40 mg Intravenous BID (diuretic) Mio Montgomery PA-C      heparin (porcine)  3-20 Units/kg/hr (Order-Specific) Intravenous Titrated Jose Parameswaran, DO 9.1 Units/kg/hr (02/19/24 0635)    heparin (porcine)  1,650 Units Intravenous Q6H PRN [facility] Parameswaran, DO       heparin (porcine)  3,300 Units Intravenous Q6H PRN [facility], DO      ipratropium  0.5 mg Nebulization BID [facility], DO      labetalol  10 mg Intravenous Q6H PRN [facility], DO      levalbuterol  1.25 mg Nebulization BID [facility], DO      lidocaine  2 patch Topical Daily PRN Stephanie Day PA-C      melatonin  3 mg Oral HS PRN TAMMIE Nunez      midodrine  10 mg Oral TID AC Kenny Alan MD      nitroglycerin  0.4 mg Sublingual Q5 Min PRN [facility], DO      ondansetron  4 mg Intravenous Q6H PRN [facility], DO      pantoprazole  40 mg Oral Daily [facility], DO      pravastatin  80 mg Oral QPM [facility], DO      remdesivir  100 mg Intravenous Q24H [facility], DO      vilazodone  40 mg Oral Daily With Breakfast [facility], DO      Zanubrutinib  160 mg Oral BID Kenny Alan MD          Today, Patient Was Seen By: Kenny Alan MD    ** Please Note: Dictation voice to text software may have been used in the creation of this document. **

## 2024-02-19 NOTE — ASSESSMENT & PLAN NOTE
Lab Results   Component Value Date    HSTNI0 2,584 (H) 02/17/2024    HSTNI2 2,049 (H) 02/17/2024    HSTNID2 -535 02/17/2024      CT chest showing no evidence of pulmonary embolism, did note mild pulmonary edema  No chest pain present, but significant SOB, anginal equivalent, sudden onset, fairly elevated troponins  EKG: Sinus Tachycardia with no obvious STEMI  Suspect a true type I NSTEMI  ADRIEN Score: 5    The patient's presentation with shortness of breath was somewhat sudden according to the son, he was driving her home from the rehab facility if she got acutely short of breath and hypoxemic again.  Certainly it is possible that the COVID infection may have triggered some degree of underlying CAD leading to an NSTEMI.  Continue heparin infusion for now  Cardiology following.  Initial plan for cardiac catheterization as per cardiology

## 2024-02-19 NOTE — PLAN OF CARE
Problem: Potential for Falls  Goal: Patient will remain free of falls  Description: INTERVENTIONS:  - Educate patient/family on patient safety including physical limitations  - Instruct patient to call for assistance with activity   - Consult OT/PT to assist with strengthening/mobility   - Keep Call bell within reach  - Keep bed low and locked with side rails adjusted as appropriate  - Keep care items and personal belongings within reach  - Initiate and maintain comfort rounds  - Make Fall Risk Sign visible to staff  - Offer Toileting every 2 Hours, in advance of need  - Initiate/Maintain bed and chair alarm  - Obtain necessary fall risk management equipment: non skid socks  - Apply yellow socks and bracelet for high fall risk patients  - Consider moving patient to room near nurses station  Outcome: Progressing     Problem: RESPIRATORY - ADULT  Goal: Achieves optimal ventilation and oxygenation  Description: INTERVENTIONS:  - Assess for changes in respiratory status  - Assess for changes in mentation and behavior  - Position to facilitate oxygenation and minimize respiratory effort  - Oxygen administered by appropriate delivery if ordered  - Initiate smoking cessation education as indicated  - Encourage broncho-pulmonary hygiene including cough, deep breathe, Incentive Spirometry  - Assess the need for suctioning and aspirate as needed  - Assess and instruct to report SOB or any respiratory difficulty  - Respiratory Therapy support as indicated  Outcome: Progressing     Problem: Prexisting or High Potential for Compromised Skin Integrity  Goal: Skin integrity is maintained or improved  Description: INTERVENTIONS:  - Identify patients at risk for skin breakdown  - Assess and monitor skin integrity  - Assess and monitor nutrition and hydration status  - Monitor labs   - Assess for incontinence   - Turn and reposition patient  - Assist with mobility/ambulation  - Relieve pressure over bony prominences  - Avoid  Returned call to Ms. Renae. Would like a call back from Dr. Pelaez who called her yesterday when she was sleeping. Please call back after 10 am.     ----- Message from Santino Henry sent at 1/21/2022  9:02 AM CST -----  Name of Who is Calling: DARCY RENAE          What is the request in detail: The patient is returning a call back to the office. Please advise          Can the clinic reply by [facility]: No         What Number to Call Back if not in [facility]: [phone]       friction and shearing  - Provide appropriate hygiene as needed including keeping skin clean and dry  - Evaluate need for skin moisturizer/barrier cream  - Collaborate with interdisciplinary team   - Patient/family teaching  - Consider wound care consult   Outcome: Progressing     Problem: PAIN - ADULT  Goal: Verbalizes/displays adequate comfort level or baseline comfort level  Description: Interventions:  - Encourage patient to monitor pain and request assistance  - Assess pain using appropriate pain scale  - Administer analgesics based on type and severity of pain and evaluate response  - Implement non-pharmacological measures as appropriate and evaluate response  - Consider cultural and social influences on pain and pain management  - Notify physician/advanced practitioner if interventions unsuccessful or patient reports new pain  Outcome: Progressing     Problem: INFECTION - ADULT  Goal: Absence or prevention of progression during hospitalization  Description: INTERVENTIONS:  - Assess and monitor for signs and symptoms of infection  - Monitor lab/diagnostic results  - Monitor all insertion sites, i.e. indwelling lines, tubes, and drains  - Monitor endotracheal if appropriate and nasal secretions for changes in amount and color  - Theresa appropriate cooling/warming therapies per order  - Administer medications as ordered  - Instruct and encourage patient and family to use good hand hygiene technique  - Identify and instruct in appropriate isolation precautions for identified infection/condition  Outcome: Progressing  Goal: Absence of fever/infection during neutropenic period  Description: INTERVENTIONS:  - Monitor WBC    Outcome: Progressing     Problem: SAFETY ADULT  Goal: Patient will remain free of falls  Description: INTERVENTIONS:  - Educate patient/family on patient safety including physical limitations  - Instruct patient to call for assistance with activity   - Consult OT/PT to assist with  strengthening/mobility   - Keep Call bell within reach  - Keep bed low and locked with side rails adjusted as appropriate  - Keep care items and personal belongings within reach  - Initiate and maintain comfort rounds  - Make Fall Risk Sign visible to staff  - Offer Toileting every 2 Hours, in advance of need  - Initiate/Maintain bed and chair alarm  - Obtain necessary fall risk management equipment: non skid socks  - Apply yellow socks and bracelet for high fall risk patients  - Consider moving patient to room near nurses station  Outcome: Progressing  Goal: Maintain or return to baseline ADL function  Description: INTERVENTIONS:  -  Assess patient's ability to carry out ADLs; assess patient's baseline for ADL function and identify physical deficits which impact ability to perform ADLs (bathing, care of mouth/teeth, toileting, grooming, dressing, etc.)  - Assess/evaluate cause of self-care deficits   - Assess range of motion  - Assess patient's mobility; develop plan if impaired  - Assess patient's need for assistive devices and provide as appropriate  - Encourage maximum independence but intervene and supervise when necessary  - Involve family in performance of ADLs  - Assess for home care needs following discharge   - Consider OT consult to assist with ADL evaluation and planning for discharge  - Provide patient education as appropriate  Outcome: Progressing  Goal: Maintains/Returns to pre admission functional level  Description: INTERVENTIONS:  - Perform AM-PAC 6 Click Basic Mobility/ Daily Activity assessment daily.  - Set and communicate daily mobility goal to care team and patient/family/caregiver.   - Collaborate with rehabilitation services on mobility goals if consulted  - Perform Range of Motion 4 times a day.  - Reposition patient every 2 hours.  - Dangle patient 4 times a day  - Stand patient 4 times a day  - Ambulate patient 4 times a day  - Out of bed to chair 3 times a day   - Out of bed for meals 3  times a day  - Out of bed for toileting  - Record patient progress and toleration of activity level   Outcome: Progressing     Problem: DISCHARGE PLANNING  Goal: Discharge to home or other facility with appropriate resources  Description: INTERVENTIONS:  - Identify barriers to discharge w/patient and caregiver  - Arrange for needed discharge resources and transportation as appropriate  - Identify discharge learning needs (meds, wound care, etc.)  - Arrange for interpretive services to assist at discharge as needed  - Refer to Case Management Department for coordinating discharge planning if the patient needs post-hospital services based on physician/advanced practitioner order or complex needs related to functional status, cognitive ability, or social support system  Outcome: Progressing     Problem: Knowledge Deficit  Goal: Patient/family/caregiver demonstrates understanding of disease process, treatment plan, medications, and discharge instructions  Description: Complete learning assessment and assess knowledge base.  Interventions:  - Provide teaching at level of understanding  - Provide teaching via preferred learning methods  Outcome: Progressing     Problem: MOBILITY - ADULT  Goal: Maintain or return to baseline ADL function  Description: INTERVENTIONS:  -  Assess patient's ability to carry out ADLs; assess patient's baseline for ADL function and identify physical deficits which impact ability to perform ADLs (bathing, care of mouth/teeth, toileting, grooming, dressing, etc.)  - Assess/evaluate cause of self-care deficits   - Assess range of motion  - Assess patient's mobility; develop plan if impaired  - Assess patient's need for assistive devices and provide as appropriate  - Encourage maximum independence but intervene and supervise when necessary  - Involve family in performance of ADLs  - Assess for home care needs following discharge   - Consider OT consult to assist with ADL evaluation and planning for  discharge  - Provide patient education as appropriate  Outcome: Progressing  Goal: Maintains/Returns to pre admission functional level  Description: INTERVENTIONS:  - Perform AM-PAC 6 Click Basic Mobility/ Daily Activity assessment daily.  - Set and communicate daily mobility goal to care team and patient/family/caregiver.   - Collaborate with rehabilitation services on mobility goals if consulted  - Perform Range of Motion 4 times a day.  - Reposition patient every 2 hours.  - Dangle patient 4 times a day  - Stand patient 4 times a day  - Ambulate patient 4 times a day  - Out of bed to chair 3 times a day   - Out of bed for meals 3 times a day  - Out of bed for toileting  - Record patient progress and toleration of activity level   Outcome: Progressing

## 2024-02-20 LAB
ANION GAP SERPL CALCULATED.3IONS-SCNC: 7 MMOL/L
APTT PPP: 57 SECONDS (ref 23–37)
APTT PPP: 74 SECONDS (ref 23–37)
APTT PPP: >210 SECONDS (ref 23–37)
BASOPHILS # BLD AUTO: 0 THOUSANDS/ÂΜL (ref 0–0.1)
BASOPHILS NFR BLD AUTO: 0 % (ref 0–1)
BUN SERPL-MCNC: 33 MG/DL (ref 5–25)
CALCIUM SERPL-MCNC: 9.2 MG/DL (ref 8.4–10.2)
CHLORIDE SERPL-SCNC: 107 MMOL/L (ref 96–108)
CO2 SERPL-SCNC: 33 MMOL/L (ref 21–32)
CREAT SERPL-MCNC: 0.88 MG/DL (ref 0.6–1.3)
EOSINOPHIL # BLD AUTO: 0 THOUSAND/ÂΜL (ref 0–0.61)
EOSINOPHIL NFR BLD AUTO: 0 % (ref 0–6)
ERYTHROCYTE [DISTWIDTH] IN BLOOD BY AUTOMATED COUNT: 12.2 % (ref 11.6–15.1)
GFR SERPL CREATININE-BSD FRML MDRD: 63 ML/MIN/1.73SQ M
GLUCOSE SERPL-MCNC: 153 MG/DL (ref 65–140)
GLUCOSE SERPL-MCNC: 91 MG/DL (ref 65–140)
HCT VFR BLD AUTO: 35.5 % (ref 34.8–46.1)
HGB BLD-MCNC: 11.5 G/DL (ref 11.5–15.4)
IMM GRANULOCYTES # BLD AUTO: 0.02 THOUSAND/UL (ref 0–0.2)
IMM GRANULOCYTES NFR BLD AUTO: 1 % (ref 0–2)
LYMPHOCYTES # BLD AUTO: 0.59 THOUSANDS/ÂΜL (ref 0.6–4.47)
LYMPHOCYTES NFR BLD AUTO: 15 % (ref 14–44)
MAGNESIUM SERPL-MCNC: 2 MG/DL (ref 1.9–2.7)
MCH RBC QN AUTO: 30.3 PG (ref 26.8–34.3)
MCHC RBC AUTO-ENTMCNC: 32.4 G/DL (ref 31.4–37.4)
MCV RBC AUTO: 94 FL (ref 82–98)
MONOCYTES # BLD AUTO: 0.21 THOUSAND/ÂΜL (ref 0.17–1.22)
MONOCYTES NFR BLD AUTO: 5 % (ref 4–12)
NEUTROPHILS # BLD AUTO: 3.26 THOUSANDS/ÂΜL (ref 1.85–7.62)
NEUTS SEG NFR BLD AUTO: 79 % (ref 43–75)
NRBC BLD AUTO-RTO: 0 /100 WBCS
PLATELET # BLD AUTO: 188 THOUSANDS/UL (ref 149–390)
PMV BLD AUTO: 10.7 FL (ref 8.9–12.7)
POTASSIUM SERPL-SCNC: 3.2 MMOL/L (ref 3.5–5.3)
RBC # BLD AUTO: 3.79 MILLION/UL (ref 3.81–5.12)
SODIUM SERPL-SCNC: 147 MMOL/L (ref 135–147)
WBC # BLD AUTO: 4.08 THOUSAND/UL (ref 4.31–10.16)

## 2024-02-20 PROCEDURE — 83735 ASSAY OF MAGNESIUM: CPT | Performed by: STUDENT IN AN ORGANIZED HEALTH CARE EDUCATION/TRAINING PROGRAM

## 2024-02-20 PROCEDURE — 85730 THROMBOPLASTIN TIME PARTIAL: CPT | Performed by: STUDENT IN AN ORGANIZED HEALTH CARE EDUCATION/TRAINING PROGRAM

## 2024-02-20 PROCEDURE — 94640 AIRWAY INHALATION TREATMENT: CPT

## 2024-02-20 PROCEDURE — 99233 SBSQ HOSP IP/OBS HIGH 50: CPT | Performed by: INTERNAL MEDICINE

## 2024-02-20 PROCEDURE — 99232 SBSQ HOSP IP/OBS MODERATE 35: CPT | Performed by: STUDENT IN AN ORGANIZED HEALTH CARE EDUCATION/TRAINING PROGRAM

## 2024-02-20 PROCEDURE — 94760 N-INVAS EAR/PLS OXIMETRY 1: CPT

## 2024-02-20 PROCEDURE — 80048 BASIC METABOLIC PNL TOTAL CA: CPT | Performed by: INTERNAL MEDICINE

## 2024-02-20 PROCEDURE — 94664 DEMO&/EVAL PT USE INHALER: CPT

## 2024-02-20 PROCEDURE — 85025 COMPLETE CBC W/AUTO DIFF WBC: CPT | Performed by: INTERNAL MEDICINE

## 2024-02-20 PROCEDURE — 87040 BLOOD CULTURE FOR BACTERIA: CPT | Performed by: STUDENT IN AN ORGANIZED HEALTH CARE EDUCATION/TRAINING PROGRAM

## 2024-02-20 PROCEDURE — 85730 THROMBOPLASTIN TIME PARTIAL: CPT | Performed by: INTERNAL MEDICINE

## 2024-02-20 PROCEDURE — 82948 REAGENT STRIP/BLOOD GLUCOSE: CPT

## 2024-02-20 RX ORDER — HYDROXYZINE HYDROCHLORIDE 25 MG/1
25 TABLET, FILM COATED ORAL ONCE
Status: COMPLETED | OUTPATIENT
Start: 2024-02-20 | End: 2024-02-20

## 2024-02-20 RX ORDER — FUROSEMIDE 10 MG/ML
40 INJECTION INTRAMUSCULAR; INTRAVENOUS DAILY
Status: DISCONTINUED | OUTPATIENT
Start: 2024-02-21 | End: 2024-02-22

## 2024-02-20 RX ORDER — POTASSIUM CHLORIDE 20 MEQ/1
40 TABLET, EXTENDED RELEASE ORAL ONCE
Status: COMPLETED | OUTPATIENT
Start: 2024-02-20 | End: 2024-02-20

## 2024-02-20 RX ADMIN — DOCUSATE SODIUM 100 MG: 100 CAPSULE, LIQUID FILLED ORAL at 09:20

## 2024-02-20 RX ADMIN — PANTOPRAZOLE SODIUM 40 MG: 40 TABLET, DELAYED RELEASE ORAL at 09:20

## 2024-02-20 RX ADMIN — LEVALBUTEROL HYDROCHLORIDE 1.25 MG: 1.25 SOLUTION RESPIRATORY (INHALATION) at 20:11

## 2024-02-20 RX ADMIN — BUPROPION HYDROCHLORIDE 75 MG: 75 TABLET, FILM COATED ORAL at 09:20

## 2024-02-20 RX ADMIN — MIDODRINE HYDROCHLORIDE 5 MG: 5 TABLET ORAL at 17:15

## 2024-02-20 RX ADMIN — MIDODRINE HYDROCHLORIDE 5 MG: 5 TABLET ORAL at 11:54

## 2024-02-20 RX ADMIN — BUDESONIDE AND FORMOTEROL FUMARATE DIHYDRATE 2 PUFF: 160; 4.5 AEROSOL RESPIRATORY (INHALATION) at 09:19

## 2024-02-20 RX ADMIN — IPRATROPIUM BROMIDE 0.5 MG: 0.5 SOLUTION RESPIRATORY (INHALATION) at 20:11

## 2024-02-20 RX ADMIN — LEVALBUTEROL HYDROCHLORIDE 1.25 MG: 1.25 SOLUTION RESPIRATORY (INHALATION) at 07:20

## 2024-02-20 RX ADMIN — HEPARIN SODIUM 1650 UNITS: 1000 INJECTION INTRAVENOUS; SUBCUTANEOUS at 00:25

## 2024-02-20 RX ADMIN — DEXAMETHASONE SODIUM PHOSPHATE 6 MG: 10 INJECTION, SOLUTION INTRAMUSCULAR; INTRAVENOUS at 21:15

## 2024-02-20 RX ADMIN — Medication 3 MG: at 23:40

## 2024-02-20 RX ADMIN — HEPARIN SODIUM 1650 UNITS: 1000 INJECTION INTRAVENOUS; SUBCUTANEOUS at 23:00

## 2024-02-20 RX ADMIN — CARVEDILOL 25 MG: 12.5 TABLET, FILM COATED ORAL at 09:20

## 2024-02-20 RX ADMIN — ZANUBRUTINIB 160 MG: 80 CAPSULE, GELATIN COATED ORAL at 17:16

## 2024-02-20 RX ADMIN — ASPIRIN 81 MG: 81 TABLET, COATED ORAL at 09:20

## 2024-02-20 RX ADMIN — FUROSEMIDE 40 MG: 10 INJECTION, SOLUTION INTRAMUSCULAR; INTRAVENOUS at 09:19

## 2024-02-20 RX ADMIN — POTASSIUM CHLORIDE 40 MEQ: 1500 TABLET, EXTENDED RELEASE ORAL at 09:20

## 2024-02-20 RX ADMIN — DOCUSATE SODIUM 100 MG: 100 CAPSULE, LIQUID FILLED ORAL at 17:15

## 2024-02-20 RX ADMIN — VILAZODONE HYDROCHLORIDE 40 MG: 20 TABLET ORAL at 09:23

## 2024-02-20 RX ADMIN — HYDROXYZINE HYDROCHLORIDE 25 MG: 25 TABLET, FILM COATED ORAL at 20:04

## 2024-02-20 RX ADMIN — BARICITINIB 2 MG: 2 TABLET, FILM COATED ORAL at 09:20

## 2024-02-20 RX ADMIN — BUDESONIDE AND FORMOTEROL FUMARATE DIHYDRATE 2 PUFF: 160; 4.5 AEROSOL RESPIRATORY (INHALATION) at 17:15

## 2024-02-20 RX ADMIN — ALBUTEROL SULFATE 2.5 MG: 2.5 SOLUTION RESPIRATORY (INHALATION) at 14:06

## 2024-02-20 RX ADMIN — IPRATROPIUM BROMIDE 0.5 MG: 0.5 SOLUTION RESPIRATORY (INHALATION) at 07:20

## 2024-02-20 RX ADMIN — CARVEDILOL 25 MG: 12.5 TABLET, FILM COATED ORAL at 17:15

## 2024-02-20 RX ADMIN — ACETAMINOPHEN 975 MG: 325 TABLET, FILM COATED ORAL at 17:15

## 2024-02-20 RX ADMIN — MIDODRINE HYDROCHLORIDE 5 MG: 5 TABLET ORAL at 06:48

## 2024-02-20 RX ADMIN — PRAVASTATIN SODIUM 80 MG: 80 TABLET ORAL at 17:15

## 2024-02-20 RX ADMIN — ZANUBRUTINIB 160 MG: 80 CAPSULE, GELATIN COATED ORAL at 09:21

## 2024-02-20 NOTE — ASSESSMENT & PLAN NOTE
Currently SpO2: 92 % on Nasal Cannula O2 Flow Rate (L/min): 3 L/min    At baseline, uses 4L NC  CT Imaging showing mild pulm edema, scattered areas of tree-in-bud opacity  Continue to treat underlying NSTEMI, COVID, mild pulm edema  Wean O2 as able

## 2024-02-20 NOTE — RESPIRATORY THERAPY NOTE
RT Protocol Note  Lauren Quintero 78 y.o. female MRN: [id number]  Unit/Bed#: -01 Encounter: [id number]    Assessment    Principal Problem:    NSTEMI (non-ST elevated myocardial infarction) ([facility])  Active Problems:    Essential hypertension    Acute on chronic respiratory failure with hypoxia (HCC)    Sepsis (HCC)    Major depressive disorder, recurrent episode, moderate ([facility])    COPD without exacerbation ([facility])    COVID-19      Home Pulmonary Medications:    Home Devices/Therapy: Home O2    Past Medical History:   Diagnosis Date    Acute congestive heart failure (HCC) 2021    Anxiety     Cardiac disease     Chest pain 2021    Chronic pain disorder     COPD (chronic obstructive pulmonary disease) ([facility])     Depression     Heart disease     Hyperlipidemia     Hypertension     MI (myocardial infarction) ([facility])     MRSA (methicillin resistant Staphylococcus aureus)     Renal disorder     benign kidney tumor     Social History     Socioeconomic History    Marital status:      Spouse name: None    Number of children: 3    Years of education: 13    Highest education level: High school graduate   Occupational History    Occupation: [employer]     Employer: [employer]     Comment: retired    Tobacco Use    Smoking status: Former     Current packs/day: 0.00     Average packs/day: 1 pack/day for 60.0 years (60.0 ttl pk-yrs)     Types: Cigarettes     Start date:      Quit date: 2016     Years since quittin.1    Smokeless tobacco: Never    Tobacco comments:     She has close to a 60 pack-year smoking history, most recently has cut down to 4-5 cigarettes daily   Vaping Use    Vaping status: Never Used   Substance and Sexual Activity    Alcohol use: Never    Drug use: No    Sexual activity: Not Currently   Other Topics Concern    None   Social History Narrative    None     Social Determinants of Health     Financial Resource Strain: Patient Declined (2024)    Received from [facility]    Overall Financial Resource Strain (CARDIA)     Difficulty of Paying Living Expenses: Patient declined   Food Insecurity: No Food Insecurity (2/19/2024)    Hunger Vital Sign     Worried About Running Out of Food in the Last Year: Never true     Ran Out of Food in the Last Year: Never true   Transportation Needs: No Transportation Needs (2/19/2024)    PRAPARE - Transportation     Lack of Transportation (Medical): No     Lack of Transportation (Non-Medical): No   Physical Activity: Patient Declined (1/6/2024)    Received from [facility]    Exercise Vital Sign     Days of Exercise per Week: Patient declined     Minutes of Exercise per Session: Patient declined   Stress: Patient Declined (1/6/2024)    Received from [facility]    [employer] - Occupational Stress Questionnaire     Feeling of Stress : Patient declined   Social Connections: Patient Declined (1/6/2024)    Received from [facility]    Social Connection and Isolation Panel [NHANES]     Frequency of Communication with Friends and Family: Patient declined     Frequency of Social Gatherings with Friends and Family: Patient declined     Attends Jainism Services: Patient declined     Active Member of Clubs or Organizations: Patient declined     Attends Club or Organization Meetings: Patient declined     Marital Status: Patient declined   Intimate Partner Violence: Patient Declined (1/6/2024)    Received from [facility]    Humiliation, Afraid, Rape, and Kick questionnaire     Fear of Current or Ex-Partner: Patient declined     Emotionally Abused: Patient declined     Physically Abused: Patient declined     Sexually Abused: Patient declined   Housing Stability: Low Risk  (2/19/2024)    Housing Stability Vital Sign     Unable to Pay for Housing in the Last Year: No     Number of Places Lived in the Last Year: 2     Unstable Housing in the Last Year: No       Subjective    Subjective Data: awake and  "alert    Objective    Physical Exam:        Vitals:  Blood pressure 151/72, pulse 77, temperature 98.1 °F (36.7 °C), temperature source Oral, resp. rate 20, height 5' 8\" (1.727 m), weight 59.9 kg (132 lb), SpO2 92%, not currently breastfeeding.          Imaging and other studies: I have personally reviewed pertinent reports.      O2 Device: NC     Plan    Respiratory Plan: Home Bronchodilator Patient pathway        Resp Comments: pt with hx of copd, will continue with bid sop/atr   "

## 2024-02-20 NOTE — ASSESSMENT & PLAN NOTE
POA as evidenced by tachycardia, tachypnea  Sepsis source: COVID positive, CT imaging showing signs of infection  Continue COVID treatment with baricitinib, dexamethasone, remdesivir  Continue to attempt wean oxygen  Prognosis overall guarded given age, comorbidities, poor functional status at baseline, discussed with patient and son in detail, both endorse DNR/DNI status.  Continue current treatment for now

## 2024-02-20 NOTE — PROGRESS NOTES
[facility]  Progress Note  Name: Lauren Quintero I  MRN: [id number]  Unit/Bed#: -01 I Date of Admission: 2/15/2024   Date of Service: 2/20/2024 I Hospital Day: 5    Assessment/Plan   COVID-19  Assessment & Plan  Lab Results   Component Value Date    SARSCOV2 Positive (A) 02/15/2024    SARSCOV2 Negative 12/12/2023    SARSCOV2 Negative 10/28/2023       Symptoms of COVID-19 SYMPTOMS: shortness of breath and Tachycardia (more than 100 beats per min)  Vaccine status: vaccinated  The patient does have acute hypoxemic respiratory failure given the same    CT chest w contrast showing Scattered areas of tree-in-bud opacity consistent with endobronchial impaction/infection      Continue remdesevir for 5 days -plan to finish today, Dexamethasone for 10 days  Continue baricitinib given worsening oxygen requirement on 2/16  The patient is on therapeutic anticoagulation given the positive troponins  Continue to monitor and attempt wean oxygen  Patient requires continued inpatient stay  Patient likely will need repeat rehab stay after discharge her    COPD without exacerbation (HCC)  Assessment & Plan  Not wheezing but SOB due to COVID  Continue IV steroids for COVID    Major depressive disorder, recurrent episode, moderate (HCC)  Assessment & Plan  Continue wellbutrin, vilazodone  Also takes Vraylar; recommend bringing from home as we do not have it on formulary - patient now has it     Sepsis (HCC)  Assessment & Plan  POA as evidenced by tachycardia, tachypnea  Sepsis source: COVID positive, CT imaging showing signs of infection  Continue COVID treatment with baricitinib, dexamethasone, remdesivir  Continue to attempt wean oxygen  Prognosis overall guarded given age, comorbidities, poor functional status at baseline, discussed with patient and son in detail, both endorse DNR/DNI status.  Continue current treatment for now      Acute on chronic respiratory failure with hypoxia (HCC)  Assessment &  Plan  Currently SpO2: 92 % on Nasal Cannula O2 Flow Rate (L/min): 3 L/min    At baseline, uses 4L NC  CT Imaging showing mild pulm edema, scattered areas of tree-in-bud opacity  Continue to treat underlying NSTEMI, COVID, mild pulm edema  Wean O2 as able      Essential hypertension  Assessment & Plan  Continue home meds; initiate lisinopril as part of NSTEMI management  Continue carvedilol  PRN IV Labetalol for SBP > 160  Monitor blood pressure          * NSTEMI (non-ST elevated myocardial infarction) ([facility])  Assessment & Plan  Lab Results   Component Value Date    HSTNI0 2,584 (H) 02/17/2024    HSTNI2 2,049 (H) 02/17/2024    HSTNID2 -535 02/17/2024      CT chest showing no evidence of pulmonary embolism, did note mild pulmonary edema  No chest pain present, but significant SOB, anginal equivalent, sudden onset, fairly elevated troponins  EKG: Sinus Tachycardia with no obvious STEMI  Suspect a true type I NSTEMI  ADRIEN Score: 5  The patient's presentation with shortness of breath was somewhat sudden according to the son, he was driving her home from the rehab facility if she got acutely short of breath and hypoxemic again.  Certainly it is possible that the COVID infection may have triggered some degree of underlying CAD leading to an NSTEMI.  Continue heparin infusion for now  Cardiology following.  Initial plan for cardiac catheterization as per cardiology                   VTE Pharmacologic Prophylaxis: VTE Score: 7 Moderate Risk (Score 3-4) - Pharmacological DVT Prophylaxis Ordered: heparin drip.    Mobility:   Basic Mobility Inpatient Raw Score: 16  JH-HLM Goal: 5: Stand one or more mins  JH-HLM Achieved: 1: Laying in bed  HLM Goal NOT achieved. Continue with multidisciplinary rounding and encourage appropriate mobility to improve upon HLM goals.    Patient Centered Rounds: I performed bedside rounds with nursing staff today.   Discussions with Specialists or Other Care Team Provider: CM    Education and  Discussions with Family / Patient:  Pt.     Total Time Spent on Date of Encounter in care of patient: 50 mins. This time was spent on one or more of the following: performing physical exam; counseling and coordination of care; obtaining or reviewing history; documenting in the medical record; reviewing/ordering tests, medications or procedures; communicating with other healthcare professionals and discussing with patient's family/caregivers.    Current Length of Stay: 5 day(s)  Current Patient Status: Inpatient   Certification Statement: The patient will continue to require additional inpatient hospital stay due to cardiac cath  Discharge Plan: Anticipate discharge in 48-72 hrs to discharge location to be determined pending rehab evaluations.    Code Status: Level 3 - DNAR and DNI    Subjective:   Pt denies chest pain, sob. Good urinary output.     Objective:     Vitals:   Temp (24hrs), Av.9 °F (36.6 °C), Min:97.6 °F (36.4 °C), Max:98.1 °F (36.7 °C)    Temp:  [97.6 °F (36.4 °C)-98.1 °F (36.7 °C)] 98.1 °F (36.7 °C)  HR:  [77] 77  Resp:  [16-20] 20  BP: (111-151)/(60-80) 112/64  SpO2:  [92 %-98 %] 92 %  Body mass index is 20.07 kg/m².     Input and Output Summary (last 24 hours):     Intake/Output Summary (Last 24 hours) at 2024 1330  Last data filed at 2024 1009  Gross per 24 hour   Intake 75 ml   Output 840 ml   Net -765 ml       Physical Exam:   Physical Exam  Constitutional:       General: She is not in acute distress.     Appearance: She is well-developed. She is not diaphoretic.   HENT:      Head: Normocephalic and atraumatic.      Nose: Nose normal.      Mouth/Throat:      Pharynx: No oropharyngeal exudate.   Eyes:      General: No scleral icterus.        Right eye: No discharge.         Left eye: No discharge.      Conjunctiva/sclera: Conjunctivae normal.   Cardiovascular:      Rate and Rhythm: Normal rate and regular rhythm.      Heart sounds: Normal heart sounds. No murmur heard.     No  friction rub. No gallop.   Pulmonary:      Effort: Pulmonary effort is normal. No respiratory distress.      Breath sounds: Normal breath sounds. No wheezing or rales.   Abdominal:      General: Bowel sounds are normal. There is no distension.      Palpations: Abdomen is soft.      Tenderness: There is no abdominal tenderness. There is no guarding.   Musculoskeletal:         General: Normal range of motion.      Cervical back: Normal range of motion and neck supple.   Skin:     General: Skin is warm and dry.      Findings: No erythema.   Neurological:      Mental Status: She is oriented to person, place, and time.   Psychiatric:         Behavior: Behavior normal.          Additional Data:     Labs:  Results from last 7 days   Lab Units 02/20/24  0622   WBC Thousand/uL 4.08*   HEMOGLOBIN g/dL 11.5   HEMATOCRIT % 35.5   PLATELETS Thousands/uL 188   NEUTROS PCT % 79*   LYMPHS PCT % 15   MONOS PCT % 5   EOS PCT % 0     Results from last 7 days   Lab Units 02/20/24  0622 02/18/24  0448 02/17/24  0444   SODIUM mmol/L 147   < > 143   POTASSIUM mmol/L 3.2*   < > 3.6   CHLORIDE mmol/L 107   < > 106   CO2 mmol/L 33*   < > 29   BUN mg/dL 33*   < > 24   CREATININE mg/dL 0.88   < > 0.88   ANION GAP mmol/L 7   < > 8   CALCIUM mg/dL 9.2   < > 9.0   ALBUMIN g/dL  --   --  4.2   TOTAL BILIRUBIN mg/dL  --   --  0.40   ALK PHOS U/L  --   --  57   ALT U/L  --   --  7   AST U/L  --   --  27   GLUCOSE RANDOM mg/dL 153*   < > 138    < > = values in this interval not displayed.     Results from last 7 days   Lab Units 02/15/24  1908   INR  1.03         Results from last 7 days   Lab Units 02/15/24  1756   HEMOGLOBIN A1C % 5.3     Results from last 7 days   Lab Units 02/16/24  0521 02/15/24  2030 02/15/24  1756   LACTIC ACID mmol/L  --  0.8  --    PROCALCITONIN ng/ml 0.11  --  <0.05       Lines/Drains:  Invasive Devices       Peripheral Intravenous Line  Duration             Peripheral IV 02/19/24 Distal;Dorsal (posterior);Right Forearm 1  day                      Telemetry:  Telemetry Orders (From admission, onward)               24 Hour Telemetry Monitoring  Continuous x 24 Hours (Telem)        Question:  Reason for 24 Hour Telemetry  Answer:  PCI/EP study (including pacer and ICD implementation), Cardiac surgery, MI, abnormal cardiac cath, and chest pain- rule out MI                     Telemetry Reviewed: Normal Sinus Rhythm  Indication for Continued Telemetry Use: Awaiting PCI/EP Study/CABG             Imaging: No pertinent imaging reviewed.    Recent Cultures (last 7 days):   Results from last 7 days   Lab Units 02/15/24  2030   BLOOD CULTURE  Staphylococcus epidermidis*  Staphylococcus pettenkoferi*   GRAM STAIN RESULT  Gram positive cocci in clusters*  Gram positive cocci in clusters*       Last 24 Hours Medication List:   Current Facility-Administered Medications   Medication Dose Route Frequency Provider Last Rate    acetaminophen  975 mg Oral Q8H PRN Stephanie Day PA-C      albuterol  2 puff Inhalation Q4H PRN Jose Nikita, DO      albuterol  2.5 mg Nebulization Q4H PRN TAMMIE Christensen      aluminum-magnesium hydroxide-simethicone  30 mL Oral Q6H PRN Jose Nikita, DO      aspirin  81 mg Oral Daily [facility] Nikita, DO      baricitinib  2 mg Oral Q24H Kenny Stone MD      budesonide-formoterol  2 puff Inhalation BID [facility] Nikita, DO      buPROPion  75 mg Oral Daily [facility] Ilanan, DO      carvedilol  25 mg Oral BID With Meals Jose Nikita, DO      dexamethasone  6 mg Intravenous Q24H [facility] Ilanan, DO      dextromethorphan-guaiFENesin  10 mL Oral Q6H PRN Stephanie Day PA-C      docusate sodium  100 mg Oral BID [facility] Nikita, DO      furosemide  40 mg Intravenous BID (diuretic) Mio Montgomery PA-C      heparin (porcine)  3-20 Units/kg/hr (Order-Specific) Intravenous Titrated Jose Mcelroywaran, DO 10.1 Units/kg/hr (02/20/24 1012)    heparin (porcine)  1,650 Units Intravenous  Q6H PRN [facility]      heparin (porcine)  3,300 Units Intravenous Q6H [facility]      ipratropium  0.5 mg Nebulization BID [facility], DO      labetalol  10 mg Intravenous Q6H PRN [facility], [facility]      levalbuterol  1.25 mg Nebulization BID [facility], DO      lidocaine  2 patch Topical Daily PRN Stephanie Day PA-C      melatonin  3 mg Oral HS PRN TAMMIE Nunez      midodrine  5 mg Oral TID AC Vasu Ramos PA-C      nitroglycerin  0.4 mg Sublingual Q5 Min PRN [facility], DO      ondansetron  4 mg Intravenous Q6H PRN [facility], DO      pantoprazole  40 mg Oral Daily [facility], DO      pravastatin  80 mg Oral QPM [facility], DO      vilazodone  40 mg Oral Daily With Breakfast [facility], [facility]      Zanubrutinib  160 mg Oral BID Kenny Stone MD          Today, Patient Was Seen By: Latia Cortes DO    **Please Note: This note may have been constructed using a voice recognition system.**

## 2024-02-20 NOTE — PLAN OF CARE
Problem: RESPIRATORY - ADULT  Goal: Achieves optimal ventilation and oxygenation  Description: INTERVENTIONS:  - Assess for changes in respiratory status  - Assess for changes in mentation and behavior  - Position to facilitate oxygenation and minimize respiratory effort  - Oxygen administered by appropriate delivery if ordered  - Initiate smoking cessation education as indicated  - Encourage broncho-pulmonary hygiene including cough, deep breathe, Incentive Spirometry  - Assess the need for suctioning and aspirate as needed  - Assess and instruct to report SOB or any respiratory difficulty  - Respiratory Therapy support as indicated  Outcome: Progressing     Problem: INFECTION - ADULT  Goal: Absence or prevention of progression during hospitalization  Description: INTERVENTIONS:  - Assess and monitor for signs and symptoms of infection  - Monitor lab/diagnostic results  - Monitor all insertion sites, i.e. indwelling lines, tubes, and drains  - Monitor endotracheal if appropriate and nasal secretions for changes in amount and color  - [place] appropriate cooling/warming therapies per order  - Administer medications as ordered  - Instruct and encourage patient and family to use good hand hygiene technique  - Identify and instruct in appropriate isolation precautions for identified infection/condition  Outcome: Progressing  Goal: Absence of fever/infection during neutropenic period  Description: INTERVENTIONS:  - Monitor WBC    Outcome: Progressing     Problem: PAIN - ADULT  Goal: Verbalizes/displays adequate comfort level or baseline comfort level  Description: Interventions:  - Encourage patient to monitor pain and request assistance  - Assess pain using appropriate pain scale  - Administer analgesics based on type and severity of pain and evaluate response  - Implement non-pharmacological measures as appropriate and evaluate response  - Consider cultural and social influences on pain and pain management  -  Notify physician/advanced practitioner if interventions unsuccessful or patient reports new pain  Outcome: Progressing

## 2024-02-20 NOTE — PROGRESS NOTES
Cardiology Progress Note - Lauren Quintero 78 y.o. female MRN: [id number]    Unit/Bed#: -[facility] Encounter: [id number]      Assessment/Plan:   Sepsis/COVID-19  2.  Acute on chronic hypoxic respiratory failure  3.  Chest pain/elevated troponins  PMHx of CAD per #4.   She presented with elevated troponins, peak 4751.  She did experience an episode of chest discomfort overnight 2/16 to 2/17 with EKG changes concerning for Wellens syndrome.  Echo: 2/18/2024: EF 35%, LV wall thickness mildly increased, mild asymmetric hypertrophy of the basal septal wall.  Systolic function severely reduced.  Hypokinetic mid anteroseptal and anteroseptal apical anterior apical septal apical inferior and apex segments.    Plan for further evaluation with cardiac catheterization tomorrow. Discussed the indications, alternatives, risks and benefit of cardiac catheterization and possible PCI. The procedure risks, benefits, and complications (including but not limited to bleeding, infection, arrhythmia, nephrotoxicity, vessel injury, myocardial infarction, CVA, and death) were reviewed.  Patient is alert and oriented x3 and wishes to proceed. All questions answered.   Continue aspirin, Coreg, statin, heparin drip    4.  CAD status post DANIELLA to the mid RCA (2018)  See #3.  Continue aspirin, statin, Coreg    5.  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 Plan for further ischemic evaluation per #3.  Echo: 2/18/2024: EF 35%, LV wall thickness mildly increased, mild asymmetric hypertrophy of the basal septal wall.  Systolic function severely reduced.  Hypokinetic mid anteroseptal and anteroseptal apical anterior apical septal apical inferior and apex segments.  Grade 1 diastolic dysfunction.  Pulmonary artery systolic pressure mildly increased to 40.0 mmHg.   Medication Regiment Includes:   GDMT:   Beta Blocker: Continue Coreg 25 mg twice daily  Plan for further optimization of GDMT following  Given that he has not had improvement in symptoms with beta blockers (have tried in the past), I would have to defer to neurology for this and would be happy to get another opinion/refer to neuro if he would like to do so.     "cardiac catheterization  She will need consideration for LifeVest prior to discharge.    6.  Acute on chronic HFrEF  Etiology: New cardiomyopathy of undetermined etiology per #5  Medication Regiment Includes:   Diuretic: Lasix decreased to 40 mg IV once daily  GDMT:   Beta Blocker: Continue Coreg 25 mg twice daily  Plan for further optimization of GDMT following cardiac catheterization  Recommend strict I's and O's, daily weights, low-sodium diet.  Continue to monitor electrolytes and replete as needed.    7.  Paroxysmal SVT  Continue Coreg 25 mg twice daily    8.  COPD   9.  Hypertension  Continue Coreg 25 mg twice daily, Lasix.  Continue midodrine 5 mg 3 times daily, will consider decreasing dose after cardiac catheterization tomorrow.    10.  Hyperlipidemia  Continue statin    11.  CKD  12.  Impaired fasting glucose      Subjective:   Spoke with patient over telephone due to active COVID-19 infection.  She offers no cardiac concerns or complaints today.  She denies chest pain/pressure, shortness of breath, palpitations, lightheadedness, dizziness, syncope, lower extremity edema.    Objective:     Vitals: Blood pressure 112/64, pulse 77, temperature 98.1 °F (36.7 °C), temperature source Oral, resp. rate 20, height 5' 8\" (1.727 m), weight 59.9 kg (132 lb), SpO2 92%, not currently breastfeeding., Body mass index is 20.07 kg/m².,   Orthostatic Blood Pressures      Flowsheet Row Most Recent Value   Blood Pressure 112/64 filed at 02/20/2024 1100   Patient Position - Orthostatic VS Lying filed at 02/20/2024 1100              Intake/Output Summary (Last 24 hours) at 2/20/2024 1511  Last data filed at 2/20/2024 1009  Gross per 24 hour   Intake 75 ml   Output 840 ml   Net -765 ml         Physical Exam:   Patient not examined.  Spoke with patient over the phone given active COVID-19 infection.    Telemetry:  Telemetry Orders (From admission, onward)               24 Hour Telemetry Monitoring  Continuous x 24 Hours (Telem)   "      Question:  Reason for 24 Hour Telemetry  Answer:  PCI/EP study (including pacer and ICD implementation), Cardiac surgery, MI, abnormal cardiac cath, and chest pain- rule out MI                     Telemetry Reviewed:  Sinus Rhythm with ST-T wave abnormalities, occasional episodes of PSVT x8-10 beats noted       Medications:      Current Facility-Administered Medications:     acetaminophen (TYLENOL) tablet 975 mg, 975 mg, Oral, Q8H PRN, Stephanie Day PA-C, 975 mg at 02/19/24 2034    albuterol (PROVENTIL HFA,VENTOLIN HFA) inhaler 2 puff, 2 puff, Inhalation, Q4H PRN, Jose Marcelinoeswaran, DO, 2 puff at 02/19/24 1255    albuterol inhalation solution 2.5 mg, 2.5 mg, Nebulization, Q4H PRN, TAMMIE Christensen, 2.5 mg at 02/20/24 1406    aluminum-magnesium hydroxide-simethicone (MAALOX) oral suspension 30 mL, 30 mL, Oral, Q6H PRN, Jose Marcelinoeswaran, DO    aspirin (ECOTRIN LOW STRENGTH) EC tablet 81 mg, 81 mg, Oral, Daily, Jose Parameswaran, DO, 81 mg at 02/20/24 0920    baricitinib (OLUMIANT) tablet 2 mg, 2 mg, Oral, Q24H, Kenny Stone MD, 2 mg at 02/20/24 0920    budesonide-formoterol (SYMBICORT) 160-4.5 mcg/act inhaler 2 puff, 2 puff, Inhalation, BID, Jose Rylandeswaran, DO, 2 puff at 02/20/24 0919    buPROPion (WELLBUTRIN) tablet 75 mg, 75 mg, Oral, Daily, Jose Parameswaran, DO, 75 mg at 02/20/24 0920    carvedilol (COREG) tablet 25 mg, 25 mg, Oral, BID With Meals, Joseveronica Marcelinoeswaran, DO, 25 mg at 02/20/24 0920    dexamethasone (PF) (DECADRON) injection 6 mg, 6 mg, Intravenous, Q24H, Jose Marcelinoeswaran, DO, 6 mg at 02/19/24 2048    dextromethorphan-guaiFENesin (ROBITUSSIN DM) oral syrup 10 mL, 10 mL, Oral, Q6H PRN, Stephanie Day PA-C, 10 mL at 02/18/24 2210    docusate sodium (COLACE) capsule 100 mg, 100 mg, Oral, BID, Jose Shelton DO, 100 mg at 02/20/24 0920    [START ON 2/21/2024] furosemide (LASIX) injection 40 mg, 40 mg, Intravenous, Daily, Vasu Ramos PA-C    heparin (porcine)  25,000 units in 0.45% NaCl 250 mL infusion (premix), 3-20 Units/kg/hr (Order-Specific), Intravenous, Titrated, Jose Marcelinoeswaran, DO, Last Rate: 5.6 mL/hr at 02/20/24 1012, 10.1 Units/kg/hr at 02/20/24 1012    heparin (porcine) injection 1,650 Units, 1,650 Units, Intravenous, Q6H PRN, Jose Parameswaran, DO, 1,650 Units at 02/20/24 0025    heparin (porcine) injection 3,300 Units, 3,300 Units, Intravenous, Q6H PRN, Jose Parameswaran, DO    ipratropium (ATROVENT) 0.02 % inhalation solution 0.5 mg, 0.5 mg, Nebulization, BID, Jose Parameswaran, DO, 0.5 mg at 02/20/24 0720    labetalol (NORMODYNE) injection 10 mg, 10 mg, Intravenous, Q6H PRN, Jose Parameswaran, DO    levalbuterol (XOPENEX) inhalation solution 1.25 mg, 1.25 mg, Nebulization, BID, Jose Parameswaran, DO, 1.25 mg at 02/20/24 0720    lidocaine (LIDODERM) 5 % patch 2 patch, 2 patch, Topical, Daily PRN, Stephanie Day PA-C, 2 patch at 02/17/24 0031    melatonin tablet 3 mg, 3 mg, Oral, HS PRN, TAMMIE Nunez, 3 mg at 02/19/24 2048    midodrine (PROAMATINE) tablet 5 mg, 5 mg, Oral, TID Vasu RODRIGUEZ PA-C, 5 mg at 02/20/24 1154    nitroglycerin (NITROSTAT) SL tablet 0.4 mg, 0.4 mg, Sublingual, Q5 Min PRN, Jose Mcelroywaran, DO, 0.4 mg at 02/17/24 0132    ondansetron (ZOFRAN) injection 4 mg, 4 mg, Intravenous, Q6H PRN, Jose Marcelinoeswaran, DO    pantoprazole (PROTONIX) EC tablet 40 mg, 40 mg, Oral, Daily, Jose Talaveraan, DO, 40 mg at 02/20/24 0920    pravastatin (PRAVACHOL) tablet 80 mg, 80 mg, Oral, QPM, Jose Marcelinoesnehalan, DO, 80 mg at 02/19/24 1705    vilazodone (VIIBRYD) tablet 40 mg, 40 mg, Oral, Daily With Breakfast, Jose Shelton, DO, 40 mg at 02/20/24 0923    Zanubrutinib CAPS 160 mg, 160 mg, Oral, BID, Kenny Stone MD, 160 mg at 02/20/24 0921     Labs & Results:    Results from last 7 days   Lab Units 02/17/24  0910 02/17/24  0656 02/17/24  0444 02/16/24  1309 02/16/24  1139 02/16/24  0921  02/15/24  2154 02/15/24  1956 02/15/24  1756   HS TNI 0HR ng/L  --   --  2,584*  --   --  3,874*  --   --  1,440*   HS TNI 2HR ng/L  --  2,049*  --   --  4,621*  --   --  3,047*  --    HS TNI 4HR ng/L 2,212*  --   --  4,751*  --   --  3,138*  --   --    HSTNI D4 ng/L -372  --   --  877*  --   --  1,698*  --   --      Results from last 7 days   Lab Units 02/20/24 0622 02/19/24 0434 02/18/24 0448   WBC Thousand/uL 4.08* 3.38* 5.53   HEMOGLOBIN g/dL 11.5 10.5* 10.9*   HEMATOCRIT % 35.5 32.6* 33.6*   PLATELETS Thousands/uL 188 172 160     Results from last 7 days   Lab Units 02/16/24  0521   TRIGLYCERIDES mg/dL 143   HDL mg/dL 55     Results from last 7 days   Lab Units 02/20/24  0622 02/19/24  0434 02/18/24  0448 02/17/24  0444 02/16/24  0521 02/15/24  1756   POTASSIUM mmol/L 3.2* 3.8 3.9 3.6 4.2 4.0   CHLORIDE mmol/L 107 108 106 106 102 107   CO2 mmol/L 33* 31 28 29 26 25   BUN mg/dL 33* 27* 27* 24 15 14   CREATININE mg/dL 0.88 0.79 0.81 0.88 0.97 0.90   CALCIUM mg/dL 9.2 8.6 8.8 9.0 9.2 8.7   ALK PHOS U/L  --   --   --  57 79 70   ALT U/L  --   --   --  7 5* 3*   AST U/L  --   --   --  27 26 17     Results from last 7 days   Lab Units 02/20/24  0735 02/19/24  2312 02/19/24  1453 02/16/24  0206 02/15/24  1908   INR   --   --   --   --  1.03   PTT seconds >210* 46* 46*   < > 30    < > = values in this interval not displayed.     Results from last 7 days   Lab Units 02/20/24  0622 02/16/24  0521   MAGNESIUM mg/dL 2.0 1.7*

## 2024-02-20 NOTE — CASE MANAGEMENT
Case Management Discharge Planning Note    Patient name Lauren Quintero  Location /-01 MRN [id number]  : 1945 Date 2024       Current Admission Date: 2/15/2024  Current Admission Diagnosis:NSTEMI (non-ST elevated myocardial infarction) (HCC)   Patient Active Problem List    Diagnosis Date Noted    NSTEMI (non-ST elevated myocardial infarction) (HCC) 02/15/2024    COVID-19 02/15/2024    Dizziness 2023    Social problem 2023    Acute on chronic respiratory failure with hypoxia and hypercapnia (HCC) 2023    Diverticulitis 10/29/2023    Moderate protein-calorie malnutrition (HCC) 2023    BRBPR (bright red blood per rectum) 2023    Generalized weakness 2023    Staring episodes 2023    Waldenstrom macroglobulinemia (HCC) 2023    Severe protein-calorie malnutrition (HCC) 2021    Positive blood culture 2021    COPD without exacerbation (HCC) 2021    Anemia 2021    Abnormal computed tomography angiography (CTA) 2021    Fatigue 2021    Major depressive disorder, recurrent episode, moderate (HCC) 2019    Flank pain 2019    Pneumonia 2019    Sepsis (HCC) 2019    CAD (coronary artery disease), native coronary artery 2018    Acute on chronic respiratory failure with hypoxia (HCC) 2018    Ambulatory dysfunction 2018    Hypokalemia 2018    Essential hypertension 2018    Tobacco abuse 2018      LOS (days): 5  Geometric Mean LOS (GMLOS) (days): 5.1  Days to GMLOS:0.3     OBJECTIVE:  Risk of Unplanned Readmission Score: 45.52     Current admission status: Inpatient   Preferred Pharmacy:   [facility]/pharmacy #[id number] - [place] - [address]  [address]  [place] [address]  Phone: [phone] Fax: [phone]    [facility] - [place] - [address]  [address]  [address]  [place] [address]  Phone: [phone] Fax:  [phone]    Primary Care Provider: Starla Bateman MD  Primary Insurance: MEDICARE  Secondary Insurance:     DISCHARGE DETAILS:  Patient reviewed during Interdisciplinary Rounds with STEVE today.  Anticipated for d/c in 24hrs.  [facility] reserved in [facility].  Facility contacts updated.  Patient had IP psych stay in January 2024.  CM to review status for exceptional admission vs Options/LOC status prior to d/c.      Accepting Facility Name, City & State : [facility] - [address] [place] [address]  Receiving Facility/Agency Phone Number: Phone: [phone]  Facility/Agency Fax Number: Fax: [phone]

## 2024-02-21 PROBLEM — N18.9 CKD (CHRONIC KIDNEY DISEASE): Status: ACTIVE | Noted: 2024-02-21

## 2024-02-21 PROBLEM — J18.9 PNEUMONIA: Status: RESOLVED | Noted: 2019-04-12 | Resolved: 2024-02-21

## 2024-02-21 PROBLEM — A41.9 SEPSIS (HCC): Status: RESOLVED | Noted: 2019-04-12 | Resolved: 2024-02-21

## 2024-02-21 LAB
ANION GAP SERPL CALCULATED.3IONS-SCNC: 6 MMOL/L
APTT PPP: 177 SECONDS (ref 23–37)
BUN SERPL-MCNC: 42 MG/DL (ref 5–25)
CALCIUM SERPL-MCNC: 9.8 MG/DL (ref 8.4–10.2)
CHLORIDE SERPL-SCNC: 108 MMOL/L (ref 96–108)
CO2 SERPL-SCNC: 34 MMOL/L (ref 21–32)
CREAT SERPL-MCNC: 1.11 MG/DL (ref 0.6–1.3)
ERYTHROCYTE [DISTWIDTH] IN BLOOD BY AUTOMATED COUNT: 12.2 % (ref 11.6–15.1)
GFR SERPL CREATININE-BSD FRML MDRD: 48 ML/MIN/1.73SQ M
GLUCOSE SERPL-MCNC: 180 MG/DL (ref 65–140)
HCT VFR BLD AUTO: 36.3 % (ref 34.8–46.1)
HGB BLD-MCNC: 11.8 G/DL (ref 11.5–15.4)
MCH RBC QN AUTO: 30.9 PG (ref 26.8–34.3)
MCHC RBC AUTO-ENTMCNC: 32.5 G/DL (ref 31.4–37.4)
MCV RBC AUTO: 95 FL (ref 82–98)
PLATELET # BLD AUTO: 189 THOUSANDS/UL (ref 149–390)
PMV BLD AUTO: 10.9 FL (ref 8.9–12.7)
POTASSIUM SERPL-SCNC: 3.9 MMOL/L (ref 3.5–5.3)
RBC # BLD AUTO: 3.82 MILLION/UL (ref 3.81–5.12)
SODIUM SERPL-SCNC: 148 MMOL/L (ref 135–147)
WBC # BLD AUTO: 7.1 THOUSAND/UL (ref 4.31–10.16)

## 2024-02-21 PROCEDURE — 94664 DEMO&/EVAL PT USE INHALER: CPT

## 2024-02-21 PROCEDURE — 99152 MOD SED SAME PHYS/QHP 5/>YRS: CPT | Performed by: INTERNAL MEDICINE

## 2024-02-21 PROCEDURE — 94640 AIRWAY INHALATION TREATMENT: CPT

## 2024-02-21 PROCEDURE — B2111ZZ FLUOROSCOPY OF MULTIPLE CORONARY ARTERIES USING LOW OSMOLAR CONTRAST: ICD-10-PCS | Performed by: INTERNAL MEDICINE

## 2024-02-21 PROCEDURE — 85730 THROMBOPLASTIN TIME PARTIAL: CPT | Performed by: INTERNAL MEDICINE

## 2024-02-21 PROCEDURE — 99232 SBSQ HOSP IP/OBS MODERATE 35: CPT | Performed by: STUDENT IN AN ORGANIZED HEALTH CARE EDUCATION/TRAINING PROGRAM

## 2024-02-21 PROCEDURE — 80048 BASIC METABOLIC PNL TOTAL CA: CPT | Performed by: STUDENT IN AN ORGANIZED HEALTH CARE EDUCATION/TRAINING PROGRAM

## 2024-02-21 PROCEDURE — 94760 N-INVAS EAR/PLS OXIMETRY 1: CPT

## 2024-02-21 PROCEDURE — 85027 COMPLETE CBC AUTOMATED: CPT | Performed by: STUDENT IN AN ORGANIZED HEALTH CARE EDUCATION/TRAINING PROGRAM

## 2024-02-21 PROCEDURE — 93458 L HRT ARTERY/VENTRICLE ANGIO: CPT | Performed by: INTERNAL MEDICINE

## 2024-02-21 PROCEDURE — 4A023N7 MEASUREMENT OF CARDIAC SAMPLING AND PRESSURE, LEFT HEART, PERCUTANEOUS APPROACH: ICD-10-PCS | Performed by: INTERNAL MEDICINE

## 2024-02-21 RX ORDER — MIDODRINE HYDROCHLORIDE 5 MG/1
2.5 TABLET ORAL
Status: DISCONTINUED | OUTPATIENT
Start: 2024-02-22 | End: 2024-03-07 | Stop reason: HOSPADM

## 2024-02-21 RX ORDER — MIDAZOLAM HYDROCHLORIDE 2 MG/2ML
INJECTION, SOLUTION INTRAMUSCULAR; INTRAVENOUS CODE/TRAUMA/SEDATION MEDICATION
Status: DISCONTINUED | OUTPATIENT
Start: 2024-02-21 | End: 2024-02-21 | Stop reason: HOSPADM

## 2024-02-21 RX ORDER — FENTANYL CITRATE 50 UG/ML
INJECTION, SOLUTION INTRAMUSCULAR; INTRAVENOUS CODE/TRAUMA/SEDATION MEDICATION
Status: DISCONTINUED | OUTPATIENT
Start: 2024-02-21 | End: 2024-02-21 | Stop reason: HOSPADM

## 2024-02-21 RX ORDER — FUROSEMIDE 10 MG/ML
40 INJECTION INTRAMUSCULAR; INTRAVENOUS ONCE
Status: COMPLETED | OUTPATIENT
Start: 2024-02-21 | End: 2024-02-21

## 2024-02-21 RX ORDER — LIDOCAINE WITH 8.4% SOD BICARB 0.9%(10ML)
SYRINGE (ML) INJECTION CODE/TRAUMA/SEDATION MEDICATION
Status: DISCONTINUED | OUTPATIENT
Start: 2024-02-21 | End: 2024-02-21 | Stop reason: HOSPADM

## 2024-02-21 RX ADMIN — IPRATROPIUM BROMIDE 0.5 MG: 0.5 SOLUTION RESPIRATORY (INHALATION) at 20:46

## 2024-02-21 RX ADMIN — CARVEDILOL 25 MG: 12.5 TABLET, FILM COATED ORAL at 16:41

## 2024-02-21 RX ADMIN — ZANUBRUTINIB 160 MG: 80 CAPSULE, GELATIN COATED ORAL at 09:04

## 2024-02-21 RX ADMIN — BUPROPION HYDROCHLORIDE 75 MG: 75 TABLET, FILM COATED ORAL at 08:57

## 2024-02-21 RX ADMIN — ASPIRIN 81 MG: 81 TABLET, COATED ORAL at 08:57

## 2024-02-21 RX ADMIN — BUDESONIDE AND FORMOTEROL FUMARATE DIHYDRATE 2 PUFF: 160; 4.5 AEROSOL RESPIRATORY (INHALATION) at 18:13

## 2024-02-21 RX ADMIN — IPRATROPIUM BROMIDE 0.5 MG: 0.5 SOLUTION RESPIRATORY (INHALATION) at 08:07

## 2024-02-21 RX ADMIN — ALBUTEROL SULFATE 2 PUFF: 90 AEROSOL, METERED RESPIRATORY (INHALATION) at 22:16

## 2024-02-21 RX ADMIN — LEVALBUTEROL HYDROCHLORIDE 1.25 MG: 1.25 SOLUTION RESPIRATORY (INHALATION) at 08:07

## 2024-02-21 RX ADMIN — ZANUBRUTINIB 160 MG: 80 CAPSULE, GELATIN COATED ORAL at 18:12

## 2024-02-21 RX ADMIN — PANTOPRAZOLE SODIUM 40 MG: 40 TABLET, DELAYED RELEASE ORAL at 08:57

## 2024-02-21 RX ADMIN — VILAZODONE HYDROCHLORIDE 40 MG: 20 TABLET ORAL at 09:03

## 2024-02-21 RX ADMIN — BARICITINIB 2 MG: 2 TABLET, FILM COATED ORAL at 09:02

## 2024-02-21 RX ADMIN — CARVEDILOL 25 MG: 12.5 TABLET, FILM COATED ORAL at 08:58

## 2024-02-21 RX ADMIN — Medication 3 MG: at 22:22

## 2024-02-21 RX ADMIN — DEXAMETHASONE SODIUM PHOSPHATE 6 MG: 10 INJECTION, SOLUTION INTRAMUSCULAR; INTRAVENOUS at 22:22

## 2024-02-21 RX ADMIN — HEPARIN SODIUM 9.1 UNITS/KG/HR: 10000 INJECTION, SOLUTION INTRAVENOUS at 08:51

## 2024-02-21 RX ADMIN — BUDESONIDE AND FORMOTEROL FUMARATE DIHYDRATE 2 PUFF: 160; 4.5 AEROSOL RESPIRATORY (INHALATION) at 10:39

## 2024-02-21 RX ADMIN — PRAVASTATIN SODIUM 80 MG: 80 TABLET ORAL at 18:13

## 2024-02-21 RX ADMIN — FUROSEMIDE 40 MG: 10 INJECTION, SOLUTION INTRAMUSCULAR; INTRAVENOUS at 16:43

## 2024-02-21 RX ADMIN — LEVALBUTEROL HYDROCHLORIDE 1.25 MG: 1.25 SOLUTION RESPIRATORY (INHALATION) at 20:46

## 2024-02-21 NOTE — PLAN OF CARE
Problem: Potential for Falls  Goal: Patient will remain free of falls  Description: INTERVENTIONS:  - Educate patient/family on patient safety including physical limitations  - Instruct patient to call for assistance with activity   - Consult OT/PT to assist with strengthening/mobility   - Keep Call bell within reach  - Keep bed low and locked with side rails adjusted as appropriate  - Keep care items and personal belongings within reach  - Initiate and maintain comfort rounds  - Make Fall Risk Sign visible to staff  - Offer Toileting every 4 Hours, in advance of need  - Initiate/Maintain bed alarm  - Obtain necessary fall risk management equipment:   - Apply yellow socks and bracelet for high fall risk patients  - Consider moving patient to room near nurses station  Outcome: Progressing

## 2024-02-21 NOTE — ASSESSMENT & PLAN NOTE
Lab Results   Component Value Date    SARSCOV2 Positive (A) 02/15/2024    SARSCOV2 Negative 12/12/2023    SARSCOV2 Negative 10/28/2023       Symptoms of COVID-19 SYMPTOMS: shortness of breath and Tachycardia (more than 100 beats per min)  Vaccine status: vaccinated  The patient does have acute hypoxemic respiratory failure given the same    CT chest w contrast showing Scattered areas of tree-in-bud opacity consistent with endobronchial impaction/infection      Finished remdesevir yesterday, Dexamethasone for 10 days  Continue baricitinib given worsening oxygen requirement on 2/16  The patient is on therapeutic anticoagulation given the positive troponins  Continue to monitor and attempt wean oxygen  Patient requires continued inpatient stay  Patient likely will need repeat rehab stay after discharge her

## 2024-02-21 NOTE — ASSESSMENT & PLAN NOTE
Lab Results   Component Value Date    HSTNI0 2,584 (H) 02/17/2024    HSTNI2 2,049 (H) 02/17/2024    HSTNID2 -535 02/17/2024      CT chest showing no evidence of pulmonary embolism, did note mild pulmonary edema  No chest pain present, but significant SOB, anginal equivalent, sudden onset, fairly elevated troponins  EKG: Sinus Tachycardia with no obvious STEMI  Suspect a true type I NSTEMI  ADREIN Score: 5  The patient's presentation with shortness of breath was somewhat sudden according to the son, he was driving her home from the rehab facility if she got acutely short of breath and hypoxemic again.  Certainly it is possible that the COVID infection may have triggered some degree of underlying CAD leading to an NSTEMI.  Continue heparin infusion for now  Cardiology following.  Initial plan for cardiac catheterization today as per cardiology

## 2024-02-21 NOTE — ASSESSMENT & PLAN NOTE
Lab Results   Component Value Date    EGFR 48 02/21/2024    EGFR 63 02/20/2024    EGFR 72 02/19/2024    CREATININE 1.11 02/21/2024    CREATININE 0.88 02/20/2024    CREATININE 0.79 02/19/2024     Pt has a history of CKD stage 2  Slightly above baseline in setting of diuresis   Holding IV lasix today   Monitor with am bmp

## 2024-02-21 NOTE — PLAN OF CARE
Problem: Potential for Falls  Goal: Patient will remain free of falls  Description: INTERVENTIONS:  - Educate patient/family on patient safety including physical limitations  - Instruct patient to call for assistance with activity   - Consult OT/PT to assist with strengthening/mobility   - Keep Call bell within reach  - Keep bed low and locked with side rails adjusted as appropriate  - Keep care items and personal belongings within reach  - Initiate and maintain comfort rounds  - Make Fall Risk Sign visible to staff  - Offer Toileting every 2 Hours, in advance of need  - Initiate/Maintain bed/chair alarm  - Obtain necessary fall risk management equipment  - Apply yellow socks and bracelet for high fall risk patients  - Consider moving patient to room near nurses station  Outcome: Progressing     Problem: RESPIRATORY - ADULT  Goal: Achieves optimal ventilation and oxygenation  Description: INTERVENTIONS:  - Assess for changes in respiratory status  - Assess for changes in mentation and behavior  - Position to facilitate oxygenation and minimize respiratory effort  - Oxygen administered by appropriate delivery if ordered  - Initiate smoking cessation education as indicated  - Encourage broncho-pulmonary hygiene including cough, deep breathe, Incentive Spirometry  - Assess the need for suctioning and aspirate as needed  - Assess and instruct to report SOB or any respiratory difficulty  - Respiratory Therapy support as indicated  Outcome: Progressing     Problem: Prexisting or High Potential for Compromised Skin Integrity  Goal: Skin integrity is maintained or improved  Description: INTERVENTIONS:  - Identify patients at risk for skin breakdown  - Assess and monitor skin integrity  - Assess and monitor nutrition and hydration status  - Monitor labs   - Assess for incontinence   - Turn and reposition patient  - Assist with mobility/ambulation  - Relieve pressure over bony prominences  - Avoid friction and shearing  -  Provide appropriate hygiene as needed including keeping skin clean and dry  - Evaluate need for skin moisturizer/barrier cream  - Collaborate with interdisciplinary team   - Patient/family teaching  - Consider wound care consult   Outcome: Progressing     Problem: PAIN - ADULT  Goal: Verbalizes/displays adequate comfort level or baseline comfort level  Description: Interventions:  - Encourage patient to monitor pain and request assistance  - Assess pain using appropriate pain scale  - Administer analgesics based on type and severity of pain and evaluate response  - Implement non-pharmacological measures as appropriate and evaluate response  - Consider cultural and social influences on pain and pain management  - Notify physician/advanced practitioner if interventions unsuccessful or patient reports new pain  Outcome: Progressing     Problem: INFECTION - ADULT  Goal: Absence or prevention of progression during hospitalization  Description: INTERVENTIONS:  - Assess and monitor for signs and symptoms of infection  - Monitor lab/diagnostic results  - Monitor all insertion sites, i.e. indwelling lines, tubes, and drains  - Monitor endotracheal if appropriate and nasal secretions for changes in amount and color  - [place] appropriate cooling/warming therapies per order  - Administer medications as ordered  - Instruct and encourage patient and family to use good hand hygiene technique  - Identify and instruct in appropriate isolation precautions for identified infection/condition  Outcome: Progressing  Goal: Absence of fever/infection during neutropenic period  Description: INTERVENTIONS:  - Monitor WBC    Outcome: Progressing     Problem: SAFETY ADULT  Goal: Patient will remain free of falls  Description: INTERVENTIONS:  - Educate patient/family on patient safety including physical limitations  - Instruct patient to call for assistance with activity   - Consult OT/PT to assist with strengthening/mobility   - Keep Call  bell within reach  - Keep bed low and locked with side rails adjusted as appropriate  - Keep care items and personal belongings within reach  - Initiate and maintain comfort rounds  - Make Fall Risk Sign visible to staff  - Offer Toileting every 2 Hours, in advance of need  - Initiate/Maintain bed alarm  - Obtain necessary fall risk management equipment  - Apply yellow socks and bracelet for high fall risk patients  - Consider moving patient to room near nurses station  Outcome: Progressing  Goal: Maintain or return to baseline ADL function  Description: INTERVENTIONS:  -  Assess patient's ability to carry out ADLs; assess patient's baseline for ADL function and identify physical deficits which impact ability to perform ADLs (bathing, care of mouth/teeth, toileting, grooming, dressing, etc.)  - Assess/evaluate cause of self-care deficits   - Assess range of motion  - Assess patient's mobility; develop plan if impaired  - Assess patient's need for assistive devices and provide as appropriate  - Encourage maximum independence but intervene and supervise when necessary  - Involve family in performance of ADLs  - Assess for home care needs following discharge   - Consider OT consult to assist with ADL evaluation and planning for discharge  - Provide patient education as appropriate  Outcome: Progressing  Goal: Maintains/Returns to pre admission functional level  Description: INTERVENTIONS:  - Perform AM-PAC 6 Click Basic Mobility/ Daily Activity assessment daily.  - Set and communicate daily mobility goal to care team and patient/family/caregiver.   - Collaborate with rehabilitation services on mobility goals if consulted  - Perform Range of Motion 3 times a day.  - Reposition patient every 2 hours.  - Dangle patient 3 times a day  - Stand patient 3 times a day  - Ambulate patient 3 times a day  - Out of bed to chair 3 times a day   - Out of bed for meals 3 times a day  - Out of bed for toileting  - Record patient  progress and toleration of activity level   Outcome: Progressing     Problem: DISCHARGE PLANNING  Goal: Discharge to home or other facility with appropriate resources  Description: INTERVENTIONS:  - Identify barriers to discharge w/patient and caregiver  - Arrange for needed discharge resources and transportation as appropriate  - Identify discharge learning needs (meds, wound care, etc.)  - Arrange for interpretive services to assist at discharge as needed  - Refer to Case Management Department for coordinating discharge planning if the patient needs post-hospital services based on physician/advanced practitioner order or complex needs related to functional status, cognitive ability, or social support system  Outcome: Progressing     Problem: Knowledge Deficit  Goal: Patient/family/caregiver demonstrates understanding of disease process, treatment plan, medications, and discharge instructions  Description: Complete learning assessment and assess knowledge base.  Interventions:  - Provide teaching at level of understanding  - Provide teaching via preferred learning methods  Outcome: Progressing     Problem: MOBILITY - ADULT  Goal: Maintain or return to baseline ADL function  Description: INTERVENTIONS:  -  Assess patient's ability to carry out ADLs; assess patient's baseline for ADL function and identify physical deficits which impact ability to perform ADLs (bathing, care of mouth/teeth, toileting, grooming, dressing, etc.)  - Assess/evaluate cause of self-care deficits   - Assess range of motion  - Assess patient's mobility; develop plan if impaired  - Assess patient's need for assistive devices and provide as appropriate  - Encourage maximum independence but intervene and supervise when necessary  - Involve family in performance of ADLs  - Assess for home care needs following discharge   - Consider OT consult to assist with ADL evaluation and planning for discharge  - Provide patient education as  appropriate  Outcome: Progressing  Goal: Maintains/Returns to pre admission functional level  Description: INTERVENTIONS:  - Perform AM-PAC 6 Click Basic Mobility/ Daily Activity assessment daily.  - Set and communicate daily mobility goal to care team and patient/family/caregiver.   - Collaborate with rehabilitation services on mobility goals if consulted  - Perform Range of Motion 3 times a day.  - Reposition patient every 2 hours.  - Dangle patient 3 times a day  - Stand patient 3 times a day  - Ambulate patient 3 times a day  - Out of bed to chair 3 times a day   - Out of bed for meals 3 times a day  - Out of bed for toileting  - Record patient progress and toleration of activity level   Outcome: Progressing

## 2024-02-21 NOTE — RESPIRATORY THERAPY NOTE
RT Protocol Note  Lauren Quintero 78 y.o. female MRN: [id number]  Unit/Bed#: -01 Encounter: [id number]    Assessment    Principal Problem:    NSTEMI (non-ST elevated myocardial infarction) ([facility])  Active Problems:    Essential hypertension    Acute on chronic respiratory failure with hypoxia (HCC)    Sepsis (HCC)    Major depressive disorder, recurrent episode, moderate ([facility])    COPD without exacerbation ([facility])    COVID-19      Home Pulmonary Medications:    Home Devices/Therapy: Home O2    Past Medical History:   Diagnosis Date    Acute congestive heart failure (HCC) 2021    Anxiety     Cardiac disease     Chest pain 2021    Chronic pain disorder     COPD (chronic obstructive pulmonary disease) ([facility])     Depression     Heart disease     Hyperlipidemia     Hypertension     MI (myocardial infarction) ([facility])     MRSA (methicillin resistant Staphylococcus aureus)     Renal disorder     benign kidney tumor     Social History     Socioeconomic History    Marital status:      Spouse name: None    Number of children: 3    Years of education: 13    Highest education level: High school graduate   Occupational History    Occupation: [email or web address]     Employer: [employer]     Comment: retired    Tobacco Use    Smoking status: Former     Current packs/day: 0.00     Average packs/day: 1 pack/day for 60.0 years (60.0 ttl pk-yrs)     Types: Cigarettes     Start date:      Quit date: 2016     Years since quittin.1    Smokeless tobacco: Never    Tobacco comments:     She has close to a 60 pack-year smoking history, most recently has cut down to 4-5 cigarettes daily   Vaping Use    Vaping status: Never Used   Substance and Sexual Activity    Alcohol use: Never    Drug use: No    Sexual activity: Not Currently   Other Topics Concern    None   Social History Narrative    None     Social Determinants of Health     Financial Resource Strain: Patient Declined (2024)    Received from [facility]    Overall Financial Resource Strain (CARDIA)     Difficulty of Paying Living Expenses: Patient declined   Food Insecurity: No Food Insecurity (2/19/2024)    Hunger Vital Sign     Worried About Running Out of Food in the Last Year: Never true     Ran Out of Food in the Last Year: Never true   Transportation Needs: No Transportation Needs (2/19/2024)    PRAPARE - Transportation     Lack of Transportation (Medical): No     Lack of Transportation (Non-Medical): No   Physical Activity: Patient Declined (1/6/2024)    Received from [facility]    Exercise Vital Sign     Days of Exercise per Week: Patient declined     Minutes of Exercise per Session: Patient declined   Stress: Patient Declined (1/6/2024)    Received from [facility]    [employer] - Occupational Stress Questionnaire     Feeling of Stress : Patient declined   Social Connections: Patient Declined (1/6/2024)    Received from [facility]    Social Connection and Isolation Panel [NHANES]     Frequency of Communication with Friends and Family: Patient declined     Frequency of Social Gatherings with Friends and Family: Patient declined     Attends Jain Services: Patient declined     Active Member of Clubs or Organizations: Patient declined     Attends Club or Organization Meetings: Patient declined     Marital Status: Patient declined   Intimate Partner Violence: Patient Declined (1/6/2024)    Received from [facility]    Humiliation, Afraid, Rape, and Kick questionnaire     Fear of Current or Ex-Partner: Patient declined     Emotionally Abused: Patient declined     Physically Abused: Patient declined     Sexually Abused: Patient declined   Housing Stability: Low Risk  (2/19/2024)    Housing Stability Vital Sign     Unable to Pay for Housing in the Last Year: No     Number of Places Lived in the Last Year: 2     Unstable Housing in the Last Year: No       Subjective    Subjective Data: awake and  "alert    Objective    Physical Exam:        Vitals:  Blood pressure 155/81, pulse 70, temperature 97.5 °F (36.4 °C), temperature source Oral, resp. rate 18, height 5' 8\" (1.727 m), weight 59.9 kg (132 lb), SpO2 96%, not currently breastfeeding.          Imaging and other studies: I have personally reviewed pertinent reports.      O2 Device: NC     Plan    Respiratory Plan: Home Bronchodilator Patient pathway        Resp Comments: will continue with bid xop/atr   "

## 2024-02-21 NOTE — ASSESSMENT & PLAN NOTE
Currently SpO2: 96 % on Nasal Cannula O2 Flow Rate (L/min): 3 L/min    At baseline, uses 4L NC  CT Imaging showing mild pulm edema, scattered areas of tree-in-bud opacity  Continue to treat underlying NSTEMI, COVID, mild pulm edema  Wean O2 as able

## 2024-02-21 NOTE — PROGRESS NOTES
[facility]  Progress Note  Name: Lauren Quintero I  MRN: [id number]  Unit/Bed#: -01 I Date of Admission: 2/15/2024   Date of Service: 2/21/2024 I Hospital Day: 6    Assessment/Plan   CKD (chronic kidney disease)  Assessment & Plan  Lab Results   Component Value Date    EGFR 48 02/21/2024    EGFR 63 02/20/2024    EGFR 72 02/19/2024    CREATININE 1.11 02/21/2024    CREATININE 0.88 02/20/2024    CREATININE 0.79 02/19/2024     Pt has a history of CKD stage 2  - Over the past 3 days her BUN has slowly been creeping up 42<33<27 as well as creatinine 1.11<0.88<0.79. Seems that her baseline creatinine is about 0.9.   - Rising creatinine is most likely due to lasix diuresis causing some hypovolemia  - Since she is getting a cardiac cath today and will be exposed to contrast we will hold lasix today    COVID-19  Assessment & Plan  Lab Results   Component Value Date    SARSCOV2 Positive (A) 02/15/2024    SARSCOV2 Negative 12/12/2023    SARSCOV2 Negative 10/28/2023       Symptoms of COVID-19 SYMPTOMS: shortness of breath and Tachycardia (more than 100 beats per min)  Vaccine status: vaccinated  The patient does have acute hypoxemic respiratory failure given the same    CT chest w contrast showing Scattered areas of tree-in-bud opacity consistent with endobronchial impaction/infection      Finished remdesevir yesterday, Dexamethasone for 10 days  Continue baricitinib given worsening oxygen requirement on 2/16  The patient is on therapeutic anticoagulation given the positive troponins  Continue to monitor and attempt wean oxygen  Patient requires continued inpatient stay  Patient likely will need repeat rehab stay after discharge her    COPD without exacerbation (HCC)  Assessment & Plan  Not wheezing but SOB due to COVID  Continue IV steroids for COVID    Major depressive disorder, recurrent episode, moderate (HCC)  Assessment & Plan  Continue wellbutrin, vilazodone  Also takes Vraylar; recommend  bringing from home as we do not have it on formulary - patient now has it     Sepsis ([facility])  Assessment & Plan  POA as evidenced by tachycardia, tachypnea  Sepsis source: COVID positive, CT imaging showing signs of infection  Continue COVID treatment with baricitinib, dexamethasone, remdesivir  Continue to attempt wean oxygen  Prognosis overall guarded given age, comorbidities, poor functional status at baseline, discussed with patient and son in detail, both endorse DNR/DNI status.  Continue current treatment for now      Acute on chronic respiratory failure with hypoxia ([facility])  Assessment & Plan  Currently SpO2: 96 % on Nasal Cannula O2 Flow Rate (L/min): 3 L/min    At baseline, uses 4L NC  CT Imaging showing mild pulm edema, scattered areas of tree-in-bud opacity  Continue to treat underlying NSTEMI, COVID, mild pulm edema  Wean O2 as able      * NSTEMI (non-ST elevated myocardial infarction) ([facility])  Assessment & Plan  Lab Results   Component Value Date    HSTNI0 2,584 (H) 02/17/2024    HSTNI2 2,049 (H) 02/17/2024    HSTNID2 -535 02/17/2024      CT chest showing no evidence of pulmonary embolism, did note mild pulmonary edema  No chest pain present, but significant SOB, anginal equivalent, sudden onset, fairly elevated troponins  EKG: Sinus Tachycardia with no obvious STEMI  Suspect a true type I NSTEMI  ADRIEN Score: 5  The patient's presentation with shortness of breath was somewhat sudden according to the son, he was driving her home from the rehab facility if she got acutely short of breath and hypoxemic again.  Certainly it is possible that the COVID infection may have triggered some degree of underlying CAD leading to an NSTEMI.  Continue heparin infusion for now  Cardiology following.  Initial plan for cardiac catheterization today as per cardiology                    VTE Pharmacologic Prophylaxis: VTE Score: 7 High Risk (Score >/= 5) - Pharmacological DVT Prophylaxis Ordered: heparin drip. Sequential  Compression Devices Ordered.    Mobility:   Basic Mobility Inpatient Raw Score: 20  -HLM Goal: 6: Walk 10 steps or more  JH-HLM Achieved: 2: Bed activities/Dependent transfer  HLM Goal NOT achieved. Continue with multidisciplinary rounding and encourage appropriate mobility to improve upon HLM goals.    Patient Centered Rounds: I performed bedside rounds with nursing staff today.  Discussions with Specialists or Other Care Team Provider:  IP CONSULT TO CARDIOLOGY      Education and Discussions with Family / Patient:     Current Length of Stay: 6 day(s)  Current Patient Status: Inpatient   Certification Statement: The patient will continue to require additional inpatient hospital stay due to plan as noted above  Discharge Plan: Anticipate discharge in 24-48 hrs to rehab facility.    Code Status: Level 3 - DNAR and DNI    Subjective:   Pt is resting comfortably in bed and understands plan for today. Denies any fever, CP, SOB, palpitations, abdominal pain. She still has a cough. All questions answered.     Objective:     Vitals:   Temp (24hrs), Av.7 °F (36.5 °C), Min:97.5 °F (36.4 °C), Max:97.9 °F (36.6 °C)    Temp:  [97.5 °F (36.4 °C)-97.9 °F (36.6 °C)] 97.5 °F (36.4 °C)  HR:  [69-80] 80  Resp:  [18-20] 18  BP: (125-161)/(61-81) 134/75  SpO2:  [89 %-96 %] 96 %  Body mass index is 20.07 kg/m².     Input and Output Summary (last 24 hours):     Intake/Output Summary (Last 24 hours) at 2024 1122  Last data filed at 2024 0715  Gross per 24 hour   Intake 875.91 ml   Output 200 ml   Net 675.91 ml       Physical Exam:   Physical Exam  Constitutional:       Appearance: Normal appearance.   HENT:      Head: Normocephalic and atraumatic.      Right Ear: External ear normal.      Left Ear: External ear normal.      Nose: Nose normal.      Mouth/Throat:      Mouth: Mucous membranes are moist.      Pharynx: Oropharynx is clear.   Eyes:      Extraocular Movements: Extraocular movements intact.       Conjunctiva/sclera: Conjunctivae normal.   Cardiovascular:      Rate and Rhythm: Normal rate and regular rhythm.      Heart sounds: Normal heart sounds. No murmur heard.     No gallop.   Pulmonary:      Effort: Pulmonary effort is normal.      Breath sounds: Wheezing present.      Comments: Wheezing in RLL  Abdominal:      General: Bowel sounds are normal.      Palpations: Abdomen is soft.      Tenderness: There is no abdominal tenderness.   Skin:     General: Skin is warm and dry.      Capillary Refill: Capillary refill takes less than 2 seconds.      Findings: Bruising present.   Neurological:      General: No focal deficit present.      Mental Status: She is alert and oriented to person, place, and time.            Additional Data:     Labs:  Results from last 7 days   Lab Units 02/21/24  0556 02/20/24  0622   WBC Thousand/uL 7.10 4.08*   HEMOGLOBIN g/dL 11.8 11.5   HEMATOCRIT % 36.3 35.5   PLATELETS Thousands/uL 189 188   NEUTROS PCT %  --  79*   LYMPHS PCT %  --  15   MONOS PCT %  --  5   EOS PCT %  --  0     Results from last 7 days   Lab Units 02/21/24  0556 02/18/24  0448 02/17/24  0444   SODIUM mmol/L 148*   < > 143   POTASSIUM mmol/L 3.9   < > 3.6   CHLORIDE mmol/L 108   < > 106   CO2 mmol/L 34*   < > 29   BUN mg/dL 42*   < > 24   CREATININE mg/dL 1.11   < > 0.88   ANION GAP mmol/L 6   < > 8   CALCIUM mg/dL 9.8   < > 9.0   ALBUMIN g/dL  --   --  4.2   TOTAL BILIRUBIN mg/dL  --   --  0.40   ALK PHOS U/L  --   --  57   ALT U/L  --   --  7   AST U/L  --   --  27   GLUCOSE RANDOM mg/dL 180*   < > 138    < > = values in this interval not displayed.     Results from last 7 days   Lab Units 02/15/24  1908   INR  1.03     Results from last 7 days   Lab Units 02/20/24  1628   POC GLUCOSE mg/dl 91     Results from last 7 days   Lab Units 02/15/24  1756   HEMOGLOBIN A1C % 5.3     Results from last 7 days   Lab Units 02/16/24  0521 02/15/24  2030 02/15/24  1756   LACTIC ACID mmol/L  --  0.8  --    PROCALCITONIN ng/ml  0.11  --  <0.05       Lines/Drains:  Invasive Devices       Peripheral Intravenous Line  Duration             Peripheral IV 02/19/24 Distal;Dorsal (posterior);Right Forearm 1 day                      Telemetry:  Telemetry Orders (From admission, onward)               24 Hour Telemetry Monitoring  Continuous x 24 Hours (Telem)        Expiring   Question:  Reason for 24 Hour Telemetry  Answer:  PCI/EP study (including pacer and ICD implementation), Cardiac surgery, MI, abnormal cardiac cath, and chest pain- rule out MI                     Telemetry Reviewed: Normal Sinus Rhythm  Indication for Continued Telemetry Use: Acute MI/Unstable Angina/Rule out ACS             Imaging: Reviewed radiology reports from this admission including:   XR chest 1 view portable    Result Date: 2/16/2024  Impression: Mild pulmonary interstitial edema. Workstation performed: [id number]     CT chest with contrast    Result Date: 2/15/2024  Impression: No evidence for pulmonary embolism. Mild pulmonary edema. Scattered areas of tree-in-bud opacity consistent with endobronchial impaction/infection. Workstation performed: [id number]       No Chest XR results available for this patient.     Results for orders placed during the hospital encounter of 02/15/24    Echo complete w/ contrast if indicated    Interpretation Summary    Left Ventricle: Left ventricular cavity size is normal. Wall thickness is mildly increased. There is mild asymmetric hypertrophy of the basal septal wall. The left ventricular ejection fraction is 35%. Systolic function is severely reduced. Diastolic function is mildly abnormal, consistent with grade I (abnormal) relaxation.    The following segments are hypokinetic: mid anteroseptal, mid inferoseptal, apical anterior, apical septal, apical inferior and apex.    Mitral Valve: There is severe annular calcification.    Tricuspid Valve: There is mild regurgitation.    IVC/SVC: The inferior vena cava is dilated.    Pulmonary  Artery: The estimated pulmonary artery systolic pressure is 40.0 mmHg. The pulmonary artery systolic pressure is mildly increased.      Recent Cultures (last 7 days):   Results from last 7 days   Lab Units 02/20/24  1616 02/15/24  2030   BLOOD CULTURE  Received in Microbiology Lab. Culture in Progress.  Received in Microbiology Lab. Culture in Progress. Staphylococcus epidermidis*  Staphylococcus pettenkoferi*   GRAM STAIN RESULT   --  Gram positive cocci in clusters*  Gram positive cocci in clusters*       Last 24 Hours Medication List:   Current Facility-Administered Medications   Medication Dose Route Frequency Provider Last Rate    acetaminophen  975 mg Oral Q8H PRN Stephanie Day PA-C      albuterol  2 puff Inhalation Q4H PRN [facility] Nikita, DO      albuterol  2.5 mg Nebulization Q4H PRN TAMMIE Christensen      aluminum-magnesium hydroxide-simethicone  30 mL Oral Q6H PRN [facility] Ilanan, DO      aspirin  81 mg Oral Daily [facility] Nikita, DO      baricitinib  2 mg Oral Q24H Kenny Stone MD      budesonide-formoterol  2 puff Inhalation BID [facility] Ilanan, DO      buPROPion  75 mg Oral Daily [facility] Nikita, DO      carvedilol  25 mg Oral BID With Meals [facility] Nikita, DO      dexamethasone  6 mg Intravenous Q24H [facility] Ilanan, DO      dextromethorphan-guaiFENesin  10 mL Oral Q6H PRN Stephanie Day PA-C      docusate sodium  100 mg Oral BID [facility] Nikita, DO      furosemide  40 mg Intravenous Daily Vasu Ramos PA-C      heparin (porcine)  3-20 Units/kg/hr (Order-Specific) Intravenous Titrated [facility] Parameswaran, DO 9.2 Units/kg/hr (02/21/24 0851)    heparin (porcine)  1,650 Units Intravenous Q6H PRN [facility] Parameswaran, DO      heparin (porcine)  3,300 Units Intravenous Q6H PRN [facility] Parameswaran, DO      ipratropium  0.5 mg Nebulization BID [facility] Rylandeswaran, DO      labetalol  10 mg Intravenous Q6H PRN [facility] Rylandeswaran, DO      levalbuterol  1.25 mg  Nebulization BID [facility] Nikita, DO      lidocaine  2 patch Topical Daily PRN Stephanie Day PA-C      melatonin  3 mg Oral HS PRN TAMMIE Nunez      midodrine  5 mg Oral TID AC Vasu Ramos PA-C      nitroglycerin  0.4 mg Sublingual Q5 Min PRN [facility] Nikita, DO      ondansetron  4 mg Intravenous Q6H PRN [facility], [facility]      pantoprazole  40 mg Oral Daily [facility], DO      pravastatin  80 mg Oral QPM [facility] Nikita, DO      vilazodone  40 mg Oral Daily With Breakfast [facility] Nikita, DO      Zanubrutinib  160 mg Oral BID Kenny Stone MD          Today, Patient Was Seen By: Ander Duarte and the attending physician, Latia Cortes DO        **Please Note: This note may have been constructed using a voice recognition system.**

## 2024-02-22 LAB
ANION GAP SERPL CALCULATED.3IONS-SCNC: 9 MMOL/L
APTT PPP: 47 SECONDS (ref 23–37)
APTT PPP: 51 SECONDS (ref 23–37)
BUN SERPL-MCNC: 37 MG/DL (ref 5–25)
CALCIUM SERPL-MCNC: 9.5 MG/DL (ref 8.4–10.2)
CHLORIDE SERPL-SCNC: 101 MMOL/L (ref 96–108)
CO2 SERPL-SCNC: 33 MMOL/L (ref 21–32)
CREAT SERPL-MCNC: 0.93 MG/DL (ref 0.6–1.3)
ERYTHROCYTE [DISTWIDTH] IN BLOOD BY AUTOMATED COUNT: 12.2 % (ref 11.6–15.1)
GFR SERPL CREATININE-BSD FRML MDRD: 59 ML/MIN/1.73SQ M
GLUCOSE SERPL-MCNC: 172 MG/DL (ref 65–140)
HCT VFR BLD AUTO: 38.2 % (ref 34.8–46.1)
HGB BLD-MCNC: 12 G/DL (ref 11.5–15.4)
MCH RBC QN AUTO: 29.8 PG (ref 26.8–34.3)
MCHC RBC AUTO-ENTMCNC: 31.4 G/DL (ref 31.4–37.4)
MCV RBC AUTO: 95 FL (ref 82–98)
PLATELET # BLD AUTO: 187 THOUSANDS/UL (ref 149–390)
PMV BLD AUTO: 11.2 FL (ref 8.9–12.7)
POTASSIUM SERPL-SCNC: 3.4 MMOL/L (ref 3.5–5.3)
RBC # BLD AUTO: 4.03 MILLION/UL (ref 3.81–5.12)
SODIUM SERPL-SCNC: 143 MMOL/L (ref 135–147)
WBC # BLD AUTO: 14.56 THOUSAND/UL (ref 4.31–10.16)

## 2024-02-22 PROCEDURE — 85730 THROMBOPLASTIN TIME PARTIAL: CPT | Performed by: STUDENT IN AN ORGANIZED HEALTH CARE EDUCATION/TRAINING PROGRAM

## 2024-02-22 PROCEDURE — 94760 N-INVAS EAR/PLS OXIMETRY 1: CPT

## 2024-02-22 PROCEDURE — 94640 AIRWAY INHALATION TREATMENT: CPT

## 2024-02-22 PROCEDURE — 85027 COMPLETE CBC AUTOMATED: CPT | Performed by: STUDENT IN AN ORGANIZED HEALTH CARE EDUCATION/TRAINING PROGRAM

## 2024-02-22 PROCEDURE — 99232 SBSQ HOSP IP/OBS MODERATE 35: CPT | Performed by: STUDENT IN AN ORGANIZED HEALTH CARE EDUCATION/TRAINING PROGRAM

## 2024-02-22 PROCEDURE — 85730 THROMBOPLASTIN TIME PARTIAL: CPT

## 2024-02-22 PROCEDURE — 80048 BASIC METABOLIC PNL TOTAL CA: CPT | Performed by: STUDENT IN AN ORGANIZED HEALTH CARE EDUCATION/TRAINING PROGRAM

## 2024-02-22 PROCEDURE — 94664 DEMO&/EVAL PT USE INHALER: CPT

## 2024-02-22 PROCEDURE — 99232 SBSQ HOSP IP/OBS MODERATE 35: CPT | Performed by: INTERNAL MEDICINE

## 2024-02-22 RX ORDER — FUROSEMIDE 20 MG/1
20 TABLET ORAL DAILY
Status: DISCONTINUED | OUTPATIENT
Start: 2024-02-23 | End: 2024-02-24

## 2024-02-22 RX ORDER — POTASSIUM CHLORIDE 20 MEQ/1
40 TABLET, EXTENDED RELEASE ORAL ONCE
Status: COMPLETED | OUTPATIENT
Start: 2024-02-22 | End: 2024-02-22

## 2024-02-22 RX ADMIN — BUPROPION HYDROCHLORIDE 75 MG: 75 TABLET, FILM COATED ORAL at 09:01

## 2024-02-22 RX ADMIN — MIDODRINE HYDROCHLORIDE 2.5 MG: 5 TABLET ORAL at 07:50

## 2024-02-22 RX ADMIN — SODIUM CHLORIDE 250 ML: 0.9 INJECTION, SOLUTION INTRAVENOUS at 02:59

## 2024-02-22 RX ADMIN — GUAIFENESIN AND DEXTROMETHORPHAN 10 ML: 100; 10 SYRUP ORAL at 15:08

## 2024-02-22 RX ADMIN — ZANUBRUTINIB 160 MG: 80 CAPSULE, GELATIN COATED ORAL at 09:03

## 2024-02-22 RX ADMIN — BARICITINIB 2 MG: 2 TABLET, FILM COATED ORAL at 09:03

## 2024-02-22 RX ADMIN — ALBUTEROL SULFATE 2 PUFF: 90 AEROSOL, METERED RESPIRATORY (INHALATION) at 14:47

## 2024-02-22 RX ADMIN — BUDESONIDE AND FORMOTEROL FUMARATE DIHYDRATE 2 PUFF: 160; 4.5 AEROSOL RESPIRATORY (INHALATION) at 17:42

## 2024-02-22 RX ADMIN — DOCUSATE SODIUM 100 MG: 100 CAPSULE, LIQUID FILLED ORAL at 09:01

## 2024-02-22 RX ADMIN — ASPIRIN 81 MG: 81 TABLET, COATED ORAL at 09:01

## 2024-02-22 RX ADMIN — VILAZODONE HYDROCHLORIDE 40 MG: 20 TABLET ORAL at 07:50

## 2024-02-22 RX ADMIN — LEVALBUTEROL HYDROCHLORIDE 1.25 MG: 1.25 SOLUTION RESPIRATORY (INHALATION) at 19:19

## 2024-02-22 RX ADMIN — CARVEDILOL 25 MG: 12.5 TABLET, FILM COATED ORAL at 16:10

## 2024-02-22 RX ADMIN — POTASSIUM CHLORIDE 40 MEQ: 1500 TABLET, EXTENDED RELEASE ORAL at 09:01

## 2024-02-22 RX ADMIN — PANTOPRAZOLE SODIUM 40 MG: 40 TABLET, DELAYED RELEASE ORAL at 09:02

## 2024-02-22 RX ADMIN — CARVEDILOL 25 MG: 12.5 TABLET, FILM COATED ORAL at 07:50

## 2024-02-22 RX ADMIN — PRAVASTATIN SODIUM 80 MG: 80 TABLET ORAL at 17:41

## 2024-02-22 RX ADMIN — LEVALBUTEROL HYDROCHLORIDE 1.25 MG: 1.25 SOLUTION RESPIRATORY (INHALATION) at 07:16

## 2024-02-22 RX ADMIN — BUDESONIDE AND FORMOTEROL FUMARATE DIHYDRATE 2 PUFF: 160; 4.5 AEROSOL RESPIRATORY (INHALATION) at 09:02

## 2024-02-22 RX ADMIN — MIDODRINE HYDROCHLORIDE 2.5 MG: 5 TABLET ORAL at 16:10

## 2024-02-22 RX ADMIN — FUROSEMIDE 40 MG: 10 INJECTION, SOLUTION INTRAMUSCULAR; INTRAVENOUS at 09:02

## 2024-02-22 RX ADMIN — ZANUBRUTINIB 160 MG: 80 CAPSULE, GELATIN COATED ORAL at 17:41

## 2024-02-22 RX ADMIN — MIDODRINE HYDROCHLORIDE 2.5 MG: 5 TABLET ORAL at 12:32

## 2024-02-22 RX ADMIN — DOCUSATE SODIUM 100 MG: 100 CAPSULE, LIQUID FILLED ORAL at 17:41

## 2024-02-22 RX ADMIN — Medication 3 MG: at 22:40

## 2024-02-22 RX ADMIN — IPRATROPIUM BROMIDE 0.5 MG: 0.5 SOLUTION RESPIRATORY (INHALATION) at 07:16

## 2024-02-22 RX ADMIN — IPRATROPIUM BROMIDE 0.5 MG: 0.5 SOLUTION RESPIRATORY (INHALATION) at 19:19

## 2024-02-22 NOTE — PROGRESS NOTES
[facility]  Progress Note  Name: Lauren Quintero I  MRN: [id number]  Unit/Bed#: -01 I Date of Admission: 2/15/2024   Date of Service: 2/22/2024 I Hospital Day: 7    Assessment/Plan   CKD (chronic kidney disease)  Assessment & Plan  Lab Results   Component Value Date    EGFR 59 02/22/2024    EGFR 48 02/21/2024    EGFR 63 02/20/2024    CREATININE 0.93 02/22/2024    CREATININE 1.11 02/21/2024    CREATININE 0.88 02/20/2024     Pt has a history of CKD stage 2  Cr improved post-cath  Can continue giving lasix  Cont to monitor bmp    COVID-19  Assessment & Plan  Lab Results   Component Value Date    SARSCOV2 Positive (A) 02/15/2024    SARSCOV2 Negative 12/12/2023    SARSCOV2 Negative 10/28/2023       Symptoms of COVID-19 SYMPTOMS: shortness of breath and Tachycardia (more than 100 beats per min)  Vaccine status: vaccinated  The patient does have acute hypoxemic respiratory failure given the same    CT chest w contrast showing Scattered areas of tree-in-bud opacity consistent with endobronchial impaction/infection      Finished remdesevir yesterday, Dexamethasone for 10 days  Continue baricitinib given worsening oxygen requirement on 2/16  Oxygen requirements back to baseline, cough improving, afebrile, medically stable for discharge  Continue to monitor  Patient likely will need rehab stay after discharge    COPD without exacerbation (HCC)  Assessment & Plan  Not wheezing but SOB due to COVID  Continue IV steroids for COVID    Major depressive disorder, recurrent episode, moderate (HCC)  Assessment & Plan  Continue wellbutrin, vilazodone  Also takes Vraylar; recommend bringing from home as we do not have it on formulary - patient now has it     Acute on chronic respiratory failure with hypoxia (HCC)  Assessment & Plan  Currently SpO2: 93 % on Nasal Cannula O2 Flow Rate (L/min): 3 L/min    At baseline, uses 4L NC  CT Imaging showing mild pulm edema, scattered areas of tree-in-bud  opacity  Continue to treat underlying NSTEMI, COVID, mild pulm edema  Wean O2 as able, seems to be back to baseline      Essential hypertension  Assessment & Plan  Continue home meds; initiate lisinopril as part of NSTEMI management  Continue carvedilol  PRN IV Labetalol for SBP > 160  Overnight pressures dropped, but responded to IV saline bolus. Pt hadn't gotten midodrine all day, restarted on midodrine  Continue to monitor blood pressure          * NSTEMI (non-ST elevated myocardial infarction) (HCC)  Assessment & Plan  Lab Results   Component Value Date    HSTNI0 2,584 (H) 02/17/2024    HSTNI2 2,049 (H) 02/17/2024    HSTNID2 -535 02/17/2024      CT chest showing no evidence of pulmonary embolism, did note mild pulmonary edema  No chest pain present, but significant SOB, anginal equivalent, sudden onset, fairly elevated troponins  EKG: Sinus Tachycardia with no obvious STEMI  Suspect a true type I NSTEMI  ADRIEN Score: 5  The patient's presentation with shortness of breath was somewhat sudden according to the son, he was driving her home from the rehab facility if she got acutely short of breath and hypoxemic again.  Certainly it is possible that the COVID infection may have triggered some degree of underlying CAD leading to an NSTEMI.  Can stop heparin drip  Cardiology following.  Cardiac cath showed significant triple vessel disease with 90% stenosis in the mid LAD, 80% stenosis in the proximal LAD, 85% stenosis in the mid Cx, and 70% in the RCA with a patent mid RCA stent. Cardiology recommends maximizing medical therapy for her CAD and follow-up outpatient with a CT surgeon for a potential CABG once she fully recovers from COVID.  Given new reduced EF of 35%, will discuss LifeVest prior to discharge  Will d/c on PO Lasix 40mg daily               Patient will require less than 30 calendar days of NF service in her symptoms/behaviors are stable.     VTE Pharmacologic Prophylaxis: VTE Score: 7 High Risk (Score >/=  5) - Pharmacological DVT Prophylaxis Ordered: heparin drip. Sequential Compression Devices Ordered.    Mobility:   Basic Mobility Inpatient Raw Score: 20  JH-HLM Goal: 6: Walk 10 steps or more  JH-HLM Achieved: 2: Bed activities/Dependent transfer  HLM Goal NOT achieved. Continue with multidisciplinary rounding and encourage appropriate mobility to improve upon HLM goals.    Patient Centered Rounds: I performed bedside rounds with nursing staff today.  Discussions with Specialists or Other Care Team Provider:  IP CONSULT TO CARDIOLOGY      Education and Discussions with Family / Patient:     Current Length of Stay: 7 day(s)  Current Patient Status: Inpatient   Certification Statement: The patient will continue to require additional inpatient hospital stay due to plan as noted above  Discharge Plan: Anticipate discharge in 24-48 hrs to discharge location to be determined pending rehab evaluations.    Code Status: Level 3 - DNAR and DNI    Subjective:   Pt is resting comfortably in bed. Tolerated procedure well, denies any SOB, CP, palpitations, abdominal pain, fevers, N/V, dysuria. Had episode of low blood pressure overnight, but resolved after saline bolus. All questions answered.    Objective:     Vitals:   Temp (24hrs), Av.9 °F (36.6 °C), Min:97.3 °F (36.3 °C), Max:98.6 °F (37 °C)    Temp:  [97.3 °F (36.3 °C)-98.6 °F (37 °C)] 98 °F (36.7 °C)  HR:  [68-91] 81  Resp:  [16-22] 19  BP: ()/(56-80) 116/56  SpO2:  [92 %-97 %] 93 %  Body mass index is 20.07 kg/m².     Input and Output Summary (last 24 hours):   No intake or output data in the 24 hours ending 24 1111    Physical Exam:   Physical Exam  Constitutional:       General: She is not in acute distress.     Appearance: Normal appearance. She is not ill-appearing or toxic-appearing.   HENT:      Head: Normocephalic and atraumatic.      Right Ear: External ear normal.      Left Ear: External ear normal.      Nose: Nose normal.      Mouth/Throat:       Mouth: Mucous membranes are moist.      Pharynx: Oropharynx is clear.   Eyes:      Extraocular Movements: Extraocular movements intact.      Conjunctiva/sclera: Conjunctivae normal.   Cardiovascular:      Rate and Rhythm: Normal rate and regular rhythm.      Pulses: Normal pulses.      Heart sounds: Normal heart sounds.   Pulmonary:      Effort: Pulmonary effort is normal.      Breath sounds: Normal breath sounds.   Abdominal:      General: Bowel sounds are normal.      Palpations: Abdomen is soft.   Musculoskeletal:      Right lower leg: No edema.      Left lower leg: No edema.   Skin:     General: Skin is warm and dry.      Capillary Refill: Capillary refill takes less than 2 seconds.   Neurological:      General: No focal deficit present.      Mental Status: She is alert and oriented to person, place, and time.            Additional Data:     Labs:  Results from last 7 days   Lab Units 02/22/24  0447 02/21/24  0556 02/20/24  0622   WBC Thousand/uL 14.56*   < > 4.08*   HEMOGLOBIN g/dL 12.0   < > 11.5   HEMATOCRIT % 38.2   < > 35.5   PLATELETS Thousands/uL 187   < > 188   NEUTROS PCT %  --   --  79*   LYMPHS PCT %  --   --  15   MONOS PCT %  --   --  5   EOS PCT %  --   --  0    < > = values in this interval not displayed.     Results from last 7 days   Lab Units 02/22/24  0447 02/18/24  0448 02/17/24  0444   SODIUM mmol/L 143   < > 143   POTASSIUM mmol/L 3.4*   < > 3.6   CHLORIDE mmol/L 101   < > 106   CO2 mmol/L 33*   < > 29   BUN mg/dL 37*   < > 24   CREATININE mg/dL 0.93   < > 0.88   ANION GAP mmol/L 9   < > 8   CALCIUM mg/dL 9.5   < > 9.0   ALBUMIN g/dL  --   --  4.2   TOTAL BILIRUBIN mg/dL  --   --  0.40   ALK PHOS U/L  --   --  57   ALT U/L  --   --  7   AST U/L  --   --  27   GLUCOSE RANDOM mg/dL 172*   < > 138    < > = values in this interval not displayed.     Results from last 7 days   Lab Units 02/15/24  1908   INR  1.03     Results from last 7 days   Lab Units 02/20/24  1628   POC GLUCOSE mg/dl 91      Results from last 7 days   Lab Units 02/15/24  1756   HEMOGLOBIN A1C % 5.3     Results from last 7 days   Lab Units 02/16/24  0521 02/15/24  2030 02/15/24  1756   LACTIC ACID mmol/L  --  0.8  --    PROCALCITONIN ng/ml 0.11  --  <0.05       Lines/Drains:  Invasive Devices       Peripheral Intravenous Line  Duration             Peripheral IV 02/19/24 Distal;Dorsal (posterior);Right Forearm 2 days                      Telemetry:  Telemetry Orders (From admission, onward)               24 Hour Telemetry Monitoring  Continuous x 24 Hours (Telem)        Expiring   Question:  Reason for 24 Hour Telemetry  Answer:  PCI/EP study (including pacer and ICD implementation), Cardiac surgery, MI, abnormal cardiac cath, and chest pain- rule out MI                     Telemetry Reviewed: Normal Sinus Rhythm  Indication for Continued Telemetry Use: Acute MI/Unstable Angina/Rule out ACS             Imaging: Reviewed radiology reports from this admission including:   XR chest 1 view portable    Result Date: 2/16/2024  Impression: Mild pulmonary interstitial edema. Workstation performed: [id number]     CT chest with contrast    Result Date: 2/15/2024  Impression: No evidence for pulmonary embolism. Mild pulmonary edema. Scattered areas of tree-in-bud opacity consistent with endobronchial impaction/infection. Workstation performed: [id number]       No Chest XR results available for this patient.     Results for orders placed during the hospital encounter of 02/15/24    Echo complete w/ contrast if indicated    Interpretation Summary    Left Ventricle: Left ventricular cavity size is normal. Wall thickness is mildly increased. There is mild asymmetric hypertrophy of the basal septal wall. The left ventricular ejection fraction is 35%. Systolic function is severely reduced. Diastolic function is mildly abnormal, consistent with grade I (abnormal) relaxation.    The following segments are hypokinetic: mid anteroseptal, mid inferoseptal,  apical anterior, apical septal, apical inferior and apex.    Mitral Valve: There is severe annular calcification.    Tricuspid Valve: There is mild regurgitation.    IVC/SVC: The inferior vena cava is dilated.    Pulmonary Artery: The estimated pulmonary artery systolic pressure is 40.0 mmHg. The pulmonary artery systolic pressure is mildly increased.      Recent Cultures (last 7 days):   Results from last 7 days   Lab Units 02/20/24  1616 02/15/24  2030   BLOOD CULTURE  No Growth at 24 hrs.  No Growth at 24 hrs. Staphylococcus epidermidis*  Staphylococcus pettenkoferi*   GRAM STAIN RESULT   --  Gram positive cocci in clusters*  Gram positive cocci in clusters*       Last 24 Hours Medication List:   Current Facility-Administered Medications   Medication Dose Route Frequency Provider Last Rate    acetaminophen  975 mg Oral Q8H PRN Vasu Ramos PA-C      albuterol  2 puff Inhalation Q4H PRN Vasu Ramos PA-C      albuterol  2.5 mg Nebulization Q4H PRN Vasu Ramos PA-C      aluminum-magnesium hydroxide-simethicone  30 mL Oral Q6H PRN Vasu Ramos PA-C      aspirin  81 mg Oral Daily Vasu Ramos PA-C      baricitinib  2 mg Oral Q24H Vasu Ramos PA-C      budesonide-formoterol  2 puff Inhalation BID Vasu Ramos PA-C      buPROPion  75 mg Oral Daily Vasu Ramos PA-C      carvedilol  25 mg Oral BID With Meals Vasu Ramos PA-C      dexamethasone  6 mg Intravenous Q24H Vasu Ramos PA-C      dextromethorphan-guaiFENesin  10 mL Oral Q6H PRN Vasu Ramos PA-C      docusate sodium  100 mg Oral BID Vasu Ramos PA-C      furosemide  40 mg Intravenous Daily Vasu Ramos PA-C      heparin (porcine)  3-20 Units/kg/hr (Order-Specific) Intravenous Titrated Vasu Ramos PA-C 11.1 Units/kg/hr (02/22/24 0538)    heparin (porcine)  1,650 Units Intravenous Q6H PRN Vasu Ramos PA-C       heparin (porcine)  3,300 Units Intravenous Q6H PRN Vasu Ramos PA-C      ipratropium  0.5 mg Nebulization BID Vasu Ramos PA-C      labetalol  10 mg Intravenous Q6H PRN Vasu Ramos PA-C      levalbuterol  1.25 mg Nebulization BID Vasu Ramos PA-C      lidocaine  2 patch Topical Daily PRN Vasu Ramos PA-C      melatonin  3 mg Oral HS PRN Vasu Ramos PA-C      midodrine  2.5 mg Oral TID AC Vasu Ramos PA-C      nitroglycerin  0.4 mg Sublingual Q5 Min PRN Vasu Ramos PA-C      ondansetron  4 mg Intravenous Q6H PRN Vasu Ramos PA-C      pantoprazole  40 mg Oral Daily Vasu Ramos PA-C      pravastatin  80 mg Oral QPM Vasu Ramos PA-C      vilazodone  40 mg Oral Daily With Breakfast Vasu Ramos PA-C      Zanubrutinib  160 mg Oral BID Vasu Ramos PA-C          Today, Patient Was Seen By: Ander Duarte and the attending physician, Latia Cortes DO        **Please Note: This note may have been constructed using a voice recognition system.**

## 2024-02-22 NOTE — QUICK NOTE
Patient hypotensive. BP down trending, recently off Midodrine. Small  fluid bolus secondary to signs of hypovolemia. Continue to monitor BP. Consider restarting midodrine if BP remains soft.

## 2024-02-22 NOTE — ASSESSMENT & PLAN NOTE
Currently SpO2: 93 % on Nasal Cannula O2 Flow Rate (L/min): 3 L/min    At baseline, uses 4L NC  CT Imaging showing mild pulm edema, scattered areas of tree-in-bud opacity  Continue to treat underlying NSTEMI, COVID, mild pulm edema  Wean O2 as able, seems to be back to baseline

## 2024-02-22 NOTE — ASSESSMENT & PLAN NOTE
Lab Results   Component Value Date    SARSCOV2 Positive (A) 02/15/2024    SARSCOV2 Negative 12/12/2023    SARSCOV2 Negative 10/28/2023       Symptoms of COVID-19 SYMPTOMS: shortness of breath and Tachycardia (more than 100 beats per min)  Vaccine status: vaccinated  The patient does have acute hypoxemic respiratory failure given the same    CT chest w contrast showing Scattered areas of tree-in-bud opacity consistent with endobronchial impaction/infection      S/p remdesevir, Dexamethasone for 10 days  Continue baricitinib given worsening oxygen requirement on 2/16  Oxygen requirements back to baseline, cough improving, afebrile, medically stable for discharge  Continue to monitor  Patient likely will need rehab stay after discharge

## 2024-02-22 NOTE — CASE MANAGEMENT
Case Management Progress Note    Patient name Lauren Quintero  Location /-01 MRN [id number]  : 1945 Date 2024       LOS (days): 7  Geometric Mean LOS (GMLOS) (days): 5.1  Days to GMLOS:-1.6        OBJECTIVE:    Current admission status: Inpatient  Preferred Pharmacy:   [id number] - [place] - [address]  [address]  [place] [address]  Phone: [phone] Fax: [phone]    [facility]  [place] - [address]  [address]  [address]  [place] [address]  Phone: [phone] Fax: [phone]    Primary Care Provider: Starla Bateman MD  Primary Insurance: MEDICARE  Secondary Insurance:     PROGRESS NOTE:  PASRR completed in [facility].  Patient has a positive screen for mental health due to IP [facility] psych hospitalization in .  However, patient qualifies as exceptional admission.  All parties aware.  [facility] can accept today if patient is cleared.  CM will continue to follow.  Patient will need a Covid screen prior to d/c.

## 2024-02-22 NOTE — ASSESSMENT & PLAN NOTE
Lab Results   Component Value Date    HSTNI0 2,584 (H) 02/17/2024    HSTNI2 2,049 (H) 02/17/2024    HSTNID2 -535 02/17/2024      CT chest showing no evidence of pulmonary embolism, did note mild pulmonary edema  No chest pain present, but significant SOB, anginal equivalent, sudden onset, fairly elevated troponins  EKG: Sinus Tachycardia with no obvious STEMI  Suspect a true type I NSTEMI  ADRIEN Score: 5  The patient's presentation with shortness of breath was somewhat sudden according to the son, he was driving her home from the rehab facility if she got acutely short of breath and hypoxemic again.  Certainly it is possible that the COVID infection may have triggered some degree of underlying CAD leading to an NSTEMI.  Can stop heparin drip  Cardiology following.  Cardiac cath showed significant triple vessel disease with 90% stenosis in the mid LAD, 80% stenosis in the proximal LAD, 85% stenosis in the mid Cx, and 70% in the RCA with a patent mid RCA stent. Cardiology recommends maximizing medical therapy for her CAD and follow-up outpatient with a CT surgeon for a potential CABG once she fully recovers from COVID.  Given new reduced EF of 35%, will discuss LifeVest prior to discharge  Will d/c on PO Lasix 20mg daily

## 2024-02-22 NOTE — ASSESSMENT & PLAN NOTE
Continue home meds; initiate lisinopril as part of NSTEMI management  Continue carvedilol  PRN IV Labetalol for SBP > 160  Overnight pressures dropped, midodrine restarted  Continue to monitor blood pressure

## 2024-02-22 NOTE — RESPIRATORY THERAPY NOTE
RT Protocol Note  Lauren Quintero 78 y.o. female MRN: [id number]  Unit/Bed#: -01 Encounter: [id number]    Assessment    Principal Problem:    NSTEMI (non-ST elevated myocardial infarction) ([facility])  Active Problems:    Essential hypertension    Acute on chronic respiratory failure with hypoxia ([facility])    Major depressive disorder, recurrent episode, moderate ([facility])    COPD without exacerbation ([facility])    COVID-19    CKD (chronic kidney disease)      Home Pulmonary Medications:  Nebs/inhalers    Home Devices/Therapy: Home O2    Past Medical History:   Diagnosis Date    Acute congestive heart failure (HCC) 2021    Anxiety     Cardiac disease     Chest pain 2021    Chronic pain disorder     COPD (chronic obstructive pulmonary disease) ([facility])     Depression     Heart disease     Hyperlipidemia     Hypertension     MI (myocardial infarction) ([facility])     MRSA (methicillin resistant Staphylococcus aureus)     Renal disorder     benign kidney tumor     Social History     Socioeconomic History    Marital status:      Spouse name: None    Number of children: 3    Years of education: 13    Highest education level: High school graduate   Occupational History    Occupation:      Employer: [employer]     Comment: retired    Tobacco Use    Smoking status: Former     Current packs/day: 0.00     Average packs/day: 1 pack/day for 60.0 years (60.0 ttl pk-yrs)     Types: Cigarettes     Start date:      Quit date: 2016     Years since quittin.1    Smokeless tobacco: Never    Tobacco comments:     She has close to a 60 pack-year smoking history, most recently has cut down to 4-5 cigarettes daily   Vaping Use    Vaping status: Never Used   Substance and Sexual Activity    Alcohol use: Never    Drug use: No    Sexual activity: Not Currently   Other Topics Concern    None   Social History Narrative    None     Social Determinants of Health     Financial Resource Strain: Patient Declined  (1/6/2024)    Received from [facility] [employer]    Overall Financial Resource Strain (CARDIA)     Difficulty of Paying Living Expenses: Patient declined   Food Insecurity: No Food Insecurity (2/19/2024)    Hunger Vital Sign     Worried About Running Out of Food in the Last Year: Never true     Ran Out of Food in the Last Year: Never true   Transportation Needs: No Transportation Needs (2/19/2024)    PRAPARE - Transportation     Lack of Transportation (Medical): No     Lack of Transportation (Non-Medical): No   Physical Activity: Patient Declined (1/6/2024)    Received from [facility] [employer]    Exercise Vital Sign     Days of Exercise per Week: Patient declined     Minutes of Exercise per Session: Patient declined   Stress: Patient Declined (1/6/2024)    Received from [facility] [employer]    [employer] [employer] - Occupational Stress Questionnaire     Feeling of Stress : Patient declined   Social Connections: Patient Declined (1/6/2024)    Received from [facility] [employer]    Social Connection and Isolation Panel [NHANES]     Frequency of Communication with Friends and Family: Patient declined     Frequency of Social Gatherings with Friends and Family: Patient declined     Attends Ep[employer] Services: Patient declined     Active Member of Clubs or Organizations: Patient declined     Attends Club or Organization Meetings: Patient declined     Marital Status: Patient declined   Intimate Partner Violence: Patient Declined (1/6/2024)    Received from [facility] [employer]    Humiliation, Afraid, Rape, and Kick questionnaire     Fear of Current or Ex-Partner: Patient declined     Emotionally Abused: Patient declined     Physically Abused: Patient declined     Sexually Abused: Patient declined   Housing Stability: Low Risk  (2/19/2024)    Housing Stability Vital Sign     Unable to Pay for Housing in the Last Year: No     Number of Places Lived in the Last Year: 2     Unstable Housing in the Last Year: No  "      Subjective    Subjective Data: awake and alert    Objective    Physical Exam:   Assessment Type: During-treatment  General Appearance: Drowsy, Eyes open/responds to voice  Respiratory Pattern: Normal  Chest Assessment: Chest expansion symmetrical  Bilateral Breath Sounds: Diminished, Clear  Cough: Congested, Non-productive  O2 Device: NC    Vitals:  Blood pressure 113/62, pulse 68, temperature (!) 97.3 °F (36.3 °C), temperature source Oral, resp. rate 18, height 5' 8\" (1.727 m), weight 59.9 kg (132 lb), SpO2 93%, not currently breastfeeding.          Imaging and other studies: I have personally reviewed pertinent reports.      O2 Device: NC     Plan    Respiratory Plan: Home Bronchodilator Patient pathway        Resp Comments: Pt resting comfortably and in no apparent distress.  Offers no complaints.  Will cont w/ edith therapy.   "

## 2024-02-22 NOTE — ASSESSMENT & PLAN NOTE
Lab Results   Component Value Date    EGFR 59 02/22/2024    EGFR 48 02/21/2024    EGFR 63 02/20/2024    CREATININE 0.93 02/22/2024    CREATININE 1.11 02/21/2024    CREATININE 0.88 02/20/2024     Pt has a history of CKD stage 2  Cr improved post-cath  Transitioned to po lasix 20 mg daily   Cont to monitor bmp

## 2024-02-22 NOTE — PLAN OF CARE
Problem: Potential for Falls  Goal: Patient will remain free of falls  Description: INTERVENTIONS:  - Educate patient/family on patient safety including physical limitations  - Instruct patient to call for assistance with activity   - Consult OT/PT to assist with strengthening/mobility   - Keep Call bell within reach  - Keep bed low and locked with side rails adjusted as appropriate  - Keep care items and personal belongings within reach  - Initiate and maintain comfort rounds  - Make Fall Risk Sign visible to staff  - Offer Toileting every 2 Hours, in advance of need  - Initiate/Maintain bed alarm  - Obtain necessary fall risk management equipment  - Apply yellow socks and bracelet for high fall risk patients  - Consider moving patient to room near nurses station  Outcome: Progressing     Problem: RESPIRATORY - ADULT  Goal: Achieves optimal ventilation and oxygenation  Description: INTERVENTIONS:  - Assess for changes in respiratory status  - Assess for changes in mentation and behavior  - Position to facilitate oxygenation and minimize respiratory effort  - Oxygen administered by appropriate delivery if ordered  - Initiate smoking cessation education as indicated  - Encourage broncho-pulmonary hygiene including cough, deep breathe, Incentive Spirometry  - Assess the need for suctioning and aspirate as needed  - Assess and instruct to report SOB or any respiratory difficulty  - Respiratory Therapy support as indicated  Outcome: Progressing     Problem: Prexisting or High Potential for Compromised Skin Integrity  Goal: Skin integrity is maintained or improved  Description: INTERVENTIONS:  - Identify patients at risk for skin breakdown  - Assess and monitor skin integrity  - Assess and monitor nutrition and hydration status  - Monitor labs   - Assess for incontinence   - Turn and reposition patient  - Assist with mobility/ambulation  - Relieve pressure over bony prominences  - Avoid friction and shearing  - Provide  appropriate hygiene as needed including keeping skin clean and dry  - Evaluate need for skin moisturizer/barrier cream  - Collaborate with interdisciplinary team   - Patient/family teaching  - Consider wound care consult   Outcome: Progressing     Problem: PAIN - ADULT  Goal: Verbalizes/displays adequate comfort level or baseline comfort level  Description: Interventions:  - Encourage patient to monitor pain and request assistance  - Assess pain using appropriate pain scale  - Administer analgesics based on type and severity of pain and evaluate response  - Implement non-pharmacological measures as appropriate and evaluate response  - Consider cultural and social influences on pain and pain management  - Notify physician/advanced practitioner if interventions unsuccessful or patient reports new pain  Outcome: Progressing     Problem: INFECTION - ADULT  Goal: Absence or prevention of progression during hospitalization  Description: INTERVENTIONS:  - Assess and monitor for signs and symptoms of infection  - Monitor lab/diagnostic results  - Monitor all insertion sites, i.e. indwelling lines, tubes, and drains  - Monitor endotracheal if appropriate and nasal secretions for changes in amount and color  - [place] appropriate cooling/warming therapies per order  - Administer medications as ordered  - Instruct and encourage patient and family to use good hand hygiene technique  - Identify and instruct in appropriate isolation precautions for identified infection/condition  Outcome: Progressing  Goal: Absence of fever/infection during neutropenic period  Description: INTERVENTIONS:  - Monitor WBC    Outcome: Progressing     Problem: SAFETY ADULT  Goal: Patient will remain free of falls  Description: INTERVENTIONS:  - Educate patient/family on patient safety including physical limitations  - Instruct patient to call for assistance with activity   - Consult OT/PT to assist with strengthening/mobility   - Keep Call bell  within reach  - Keep bed low and locked with side rails adjusted as appropriate  - Keep care items and personal belongings within reach  - Initiate and maintain comfort rounds  - Make Fall Risk Sign visible to staff  - Offer Toileting every 2 Hours, in advance of need  - Initiate/Maintain bed alarm  - Obtain necessary fall risk management equipment  - Apply yellow socks and bracelet for high fall risk patients  - Consider moving patient to room near nurses station  Outcome: Progressing  Goal: Maintain or return to baseline ADL function  Description: INTERVENTIONS:  -  Assess patient's ability to carry out ADLs; assess patient's baseline for ADL function and identify physical deficits which impact ability to perform ADLs (bathing, care of mouth/teeth, toileting, grooming, dressing, etc.)  - Assess/evaluate cause of self-care deficits   - Assess range of motion  - Assess patient's mobility; develop plan if impaired  - Assess patient's need for assistive devices and provide as appropriate  - Encourage maximum independence but intervene and supervise when necessary  - Involve family in performance of ADLs  - Assess for home care needs following discharge   - Consider OT consult to assist with ADL evaluation and planning for discharge  - Provide patient education as appropriate  Outcome: Progressing  Goal: Maintains/Returns to pre admission functional level  Description: INTERVENTIONS:  - Perform AM-PAC 6 Click Basic Mobility/ Daily Activity assessment daily.  - Set and communicate daily mobility goal to care team and patient/family/caregiver.   - Collaborate with rehabilitation services on mobility goals if consulted  - Perform Range of Motion 3 times a day.  - Reposition patient every 2 hours.  - Dangle patient 3 times a day  - Stand patient 3 times a day  - Ambulate patient 3 times a day  - Out of bed to chair 3 times a day   - Out of bed for meals 3 times a day  - Out of bed for toileting  - Record patient progress  and toleration of activity level   Outcome: Progressing     Problem: DISCHARGE PLANNING  Goal: Discharge to home or other facility with appropriate resources  Description: INTERVENTIONS:  - Identify barriers to discharge w/patient and caregiver  - Arrange for needed discharge resources and transportation as appropriate  - Identify discharge learning needs (meds, wound care, etc.)  - Arrange for interpretive services to assist at discharge as needed  - Refer to Case Management Department for coordinating discharge planning if the patient needs post-hospital services based on physician/advanced practitioner order or complex needs related to functional status, cognitive ability, or social support system  Outcome: Progressing     Problem: Knowledge Deficit  Goal: Patient/family/caregiver demonstrates understanding of disease process, treatment plan, medications, and discharge instructions  Description: Complete learning assessment and assess knowledge base.  Interventions:  - Provide teaching at level of understanding  - Provide teaching via preferred learning methods  Outcome: Progressing     Problem: MOBILITY - ADULT  Goal: Maintain or return to baseline ADL function  Description: INTERVENTIONS:  -  Assess patient's ability to carry out ADLs; assess patient's baseline for ADL function and identify physical deficits which impact ability to perform ADLs (bathing, care of mouth/teeth, toileting, grooming, dressing, etc.)  - Assess/evaluate cause of self-care deficits   - Assess range of motion  - Assess patient's mobility; develop plan if impaired  - Assess patient's need for assistive devices and provide as appropriate  - Encourage maximum independence but intervene and supervise when necessary  - Involve family in performance of ADLs  - Assess for home care needs following discharge   - Consider OT consult to assist with ADL evaluation and planning for discharge  - Provide patient education as appropriate  Outcome:  Progressing  Goal: Maintains/Returns to pre admission functional level  Description: INTERVENTIONS:  - Perform AM-PAC 6 Click Basic Mobility/ Daily Activity assessment daily.  - Set and communicate daily mobility goal to care team and patient/family/caregiver.   - Collaborate with rehabilitation services on mobility goals if consulted  - Perform Range of Motion 3 times a day.  - Reposition patient every 2 hours.  - Dangle patient 3 times a day  - Stand patient 3 times a day  - Ambulate patient 3 times a day  - Out of bed to chair 3 times a day   - Out of bed for meals 3 times a day  - Out of bed for toileting  - Record patient progress and toleration of activity level   Outcome: Progressing

## 2024-02-22 NOTE — PROGRESS NOTES
Cardiology Progress Note - Lauren Quintero 78 y.o. female MRN: [id number]    Unit/Bed#: -01 Encounter: [id number]      Assessment/Plan:   Elevated troponin - [facility]ivessel [facility]  PMHx of CAD per #4.   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 The following segments are hypokinetic: mid anteroseptal, mid inferoseptal, apical anterior, apical septal, apical inferior and apex.   Cardiac cath 2/21: Three-vessel CAD with patent mid RCA stent.  Proximal to mid LAD 80% stenosed, mid LAD 90% stenosed, mid circumflex 85% stenosed, first marginal 75% stenosed, mid RCA 70% stenosis.  Continue aspirin, Coreg 25 mg twice daily, statin.  Heparin drip discontinued.  Goals of care and treatment options discussed with patient. Patient states she will consider her goals and options, if she would prefer to pursue surgical versus medical management versus palliative care. Will plan to discuss again tomorrow. May consider LifeVest upon discharge depending on patient's goals of care.      2.  New ischemic cardiomyopathy-EF 35%  Appears euvolemic on exam.  See #3.  Medication Regiment Includes:   GDMT:   Beta Blocker: Continue Coreg 25 mg twice daily  Will hold off on further GDMT at this point given hypotension/soft BPs.     3.  Acute on chronic HFrEF  Etiology: Ischemic cardiomyopathy per #2.  Appears euvolemic on exam  Medication Regiment Includes:   Diuretic: Given euvolemic clinical status, episode of hypotension overnight, and soft BPs, will decrease Lasix to 20 mg p.o. once daily.  GDMT:   Beta Blocker: Continue Coreg 25 mg twice daily.  Will hold off on further GDMT at this point given hypotension/soft BPs.  Recommend daily weights, strict I's and O's, low-sodium diet.  Recommend continuing to monitor  "and replete electrolytes as needed.    4.  Paroxysmal SVT  Continue Coreg 25 mg twice daily  5.  Sepsis/COVID-19  6.  Acute on chronic hypoxic respiratory failure  7.  COPD  8.  Hypertension - with recent hypotension/soft BP's  Continue Coreg.  Lasix decreased due to euvolemic clinical status and hypotension/soft BPs.  Continue midodrine 2.5 mg 3 times daily.    9. Hyperlipidemia  Continue statin    10. CKD  11. IFG    Subjective:   Patient seen and examined.  She states she feels tired, cold, and endorses a cough.  She denies chest pain/pressure, shortness of breath palpitations, lightheadedness, dizziness, syncope, lower extremity edema.   Per chart review, patient had an episode of hypotension last night that resolved with fluid bolus.  She is continued with soft BPs this a.m.    Objective:     Vitals: Blood pressure 111/57, pulse 81, temperature 98 °F (36.7 °C), temperature source Oral, resp. rate 19, height 5' 8\" (1.727 m), weight 59.9 kg (132 lb), SpO2 93%, not currently breastfeeding., Body mass index is 20.07 kg/m².,   Orthostatic Blood Pressures      Flowsheet Row Most Recent Value   Blood Pressure 111/57 filed at 02/22/2024 1100   Patient Position - Orthostatic VS Lying filed at 02/22/2024 1100              Intake/Output Summary (Last 24 hours) at 2/22/2024 1329  Last data filed at 2/22/2024 1100  Gross per 24 hour   Intake --   Output 800 ml   Net -800 ml         Physical Exam:   GEN: Alert and oriented x 3, in no acute distress.  Ill-appearing.   HEENT: Sclera anicteric, conjunctivae pink, mucous membranes moist. Oropharynx clear.   NECK: Supple, no significant JVD. Trachea midline, no thyromegaly.   HEART: Regular rhythm, normal S1 and S2, no murmurs, clicks, gallops or rubs. PMI nondisplaced, no thrills.   LUNGS: On 3 L O2 via nasal cannula.  Clear to auscultation bilaterally; no wheezes, rales, or rhonchi. No signs of respiratory distress. Productive cough.   ABDOMEN: Soft, nontender, non-distended. "   EXTREMITIES: Skin warm and well perfused, no clubbing, cyanosis, or edema.  DP pulses 1+ bilaterally.  NEURO: No focal findings. Normal speech. Mood and affect normal.   SKIN: Normal without suspicious lesions on exposed skin. Cardiac cath access site in R groin region without evidence of bleeding or hematoma.     Telemetry:  Telemetry Orders (From admission, onward)               24 Hour Telemetry Monitoring  Continuous x 24 Hours (Telem)        Question:  Reason for 24 Hour Telemetry  Answer:  PCI/EP study (including pacer and ICD implementation), Cardiac surgery, MI, abnormal cardiac cath, and chest pain- rule out MI                     Telemetry Reviewed:  Sinus rhythm with ST-T wave abnormalities, PVCs    Medications:      Current Facility-Administered Medications:     acetaminophen (TYLENOL) tablet 975 mg, 975 mg, Oral, Q8H PRN, Vasu Ramos PA-C, 975 mg at 02/20/24 1715    albuterol (PROVENTIL HFA,VENTOLIN HFA) inhaler 2 puff, 2 puff, Inhalation, Q4H PRN, Vasu Ramos PA-C, 2 puff at 02/21/24 2216    albuterol inhalation solution 2.5 mg, 2.5 mg, Nebulization, Q4H PRN, Vasu Ramos PA-C, 2.5 mg at 02/20/24 1406    aluminum-magnesium hydroxide-simethicone (MAALOX) oral suspension 30 mL, 30 mL, Oral, Q6H PRN, Vasu Ramos PA-C    aspirin (ECOTRIN LOW STRENGTH) EC tablet 81 mg, 81 mg, Oral, Daily, Vasu Ramos PA-C, 81 mg at 02/22/24 0901    baricitinib (OLUMIANT) tablet 2 mg, 2 mg, Oral, Q24H, Vasu Ramos PA-C, 2 mg at 02/22/24 0903    budesonide-formoterol (SYMBICORT) 160-4.5 mcg/act inhaler 2 puff, 2 puff, Inhalation, BID, Vasu Ramos PA-C, 2 puff at 02/22/24 0902    buPROPion (WELLBUTRIN) tablet 75 mg, 75 mg, Oral, Daily, Vasu Ramos PA-C, 75 mg at 02/22/24 0901    carvedilol (COREG) tablet 25 mg, 25 mg, Oral, BID With Meals, Vasu Ramos PA-C, 25 mg at 02/22/24 0750    dextromethorphan-guaiFENesin (ROBITUSSIN DM)  oral syrup 10 mL, 10 mL, Oral, Q6H PRN, Vasu Ramos, PA-C, 10 mL at 02/18/24 2210    docusate sodium (COLACE) capsule 100 mg, 100 mg, Oral, BID, Vasu Ramos, PA-C, 100 mg at 02/22/24 0901    [START ON 2/23/2024] furosemide (LASIX) tablet 20 mg, 20 mg, Oral, Daily, Vasu Ramos PA-C    ipratropium (ATROVENT) 0.02 % inhalation solution 0.5 mg, 0.5 mg, Nebulization, BID, Vasu Ramos, PA-C, 0.5 mg at 02/22/24 0716    labetalol (NORMODYNE) injection 10 mg, 10 mg, Intravenous, Q6H PRN, Vasu Ramos PA-C    levalbuterol (XOPENEX) inhalation solution 1.25 mg, 1.25 mg, Nebulization, BID, Vasu Ramos PA-C, 1.25 mg at 02/22/24 0716    lidocaine (LIDODERM) 5 % patch 2 patch, 2 patch, Topical, Daily PRN, Vasu Ramos PA-C, 2 patch at 02/17/24 0031    melatonin tablet 3 mg, 3 mg, Oral, HS PRN, Vasu Ramos, PA-C, 3 mg at 02/21/24 2222    midodrine (PROAMATINE) tablet 2.5 mg, 2.5 mg, Oral, TID AC, Vasu Ramos PA-C, 2.5 mg at 02/22/24 1232    nitroglycerin (NITROSTAT) SL tablet 0.4 mg, 0.4 mg, Sublingual, Q5 Min PRN, Vasu Ramos, PA-C, 0.4 mg at 02/17/24 0132    ondansetron (ZOFRAN) injection 4 mg, 4 mg, Intravenous, Q6H PRN, Vasu Ramos, PA-C    pantoprazole (PROTONIX) EC tablet 40 mg, 40 mg, Oral, Daily, Vasu Ramos, PA-C, 40 mg at 02/22/24 0902    pravastatin (PRAVACHOL) tablet 80 mg, 80 mg, Oral, QPM, Vasu Ramos PA-C, 80 mg at 02/21/24 1813    vilazodone (VIIBRYD) tablet 40 mg, 40 mg, Oral, Daily With Breakfast, Vasu Ramos PA-C, 40 mg at 02/22/24 0750    Zanubrutinib CAPS 160 mg, 160 mg, Oral, BID, Vasu Ramos PA-C, 160 mg at 02/22/24 0903     Labs & Results:    Results from last 7 days   Lab Units 02/17/24  0910 02/17/24  0656 02/17/24  0444 02/16/24  1309 02/16/24  1139 02/16/24  0921 02/15/24  2154 02/15/24  1956 02/15/24  1756   HS TNI 0HR ng/L  --   --  2,584*  --   --   3,874*  --   --  1,440*   HS TNI 2HR ng/L  --  2,049*  --   --  4,621*  --   --  3,047*  --    HS TNI 4HR ng/L 2,212*  --   --  4,751*  --   --  3,138*  --   --    HSTNI D4 ng/L -372  --   --  877*  --   --  1,698*  --   --      Results from last 7 days   Lab Units 02/22/24 0447 02/21/24 0556 02/20/24 0622   WBC Thousand/uL 14.56* 7.10 4.08*   HEMOGLOBIN g/dL 12.0 11.8 11.5   HEMATOCRIT % 38.2 36.3 35.5   PLATELETS Thousands/uL 187 189 188     Results from last 7 days   Lab Units 02/16/24 0521   TRIGLYCERIDES mg/dL 143   HDL mg/dL 55     Results from last 7 days   Lab Units 02/22/24 0447 02/21/24  0556 02/20/24  0622 02/18/24 0448 02/17/24 0444 02/16/24  0521 02/15/24  1756   POTASSIUM mmol/L 3.4* 3.9 3.2*   < > 3.6 4.2 4.0   CHLORIDE mmol/L 101 108 107   < > 106 102 107   CO2 mmol/L 33* 34* 33*   < > 29 26 25   BUN mg/dL 37* 42* 33*   < > 24 15 14   CREATININE mg/dL 0.93 1.11 0.88   < > 0.88 0.97 0.90   CALCIUM mg/dL 9.5 9.8 9.2   < > 9.0 9.2 8.7   ALK PHOS U/L  --   --   --   --  57 79 70   ALT U/L  --   --   --   --  7 5* 3*   AST U/L  --   --   --   --  27 26 17    < > = values in this interval not displayed.     Results from last 7 days   Lab Units 02/22/24  1142 02/22/24 0447 02/21/24  0556 02/16/24  0206 02/15/24  1908   INR   --   --   --   --  1.03   PTT seconds 51* 47* 177*   < > 30    < > = values in this interval not displayed.     Results from last 7 days   Lab Units 02/20/24  0622 02/16/24  0521   MAGNESIUM mg/dL 2.0 1.7*

## 2024-02-23 ENCOUNTER — TELEPHONE (OUTPATIENT)
Dept: CARDIOLOGY CLINIC | Facility: CLINIC | Age: 79
End: 2024-02-23

## 2024-02-23 ENCOUNTER — APPOINTMENT (INPATIENT)
Dept: RADIOLOGY | Facility: HOSPITAL | Age: 79
DRG: 871 | End: 2024-02-23
Payer: MEDICARE

## 2024-02-23 LAB
ANION GAP SERPL CALCULATED.3IONS-SCNC: 10 MMOL/L
BUN SERPL-MCNC: 37 MG/DL (ref 5–25)
CALCIUM SERPL-MCNC: 9.8 MG/DL (ref 8.4–10.2)
CHLORIDE SERPL-SCNC: 101 MMOL/L (ref 96–108)
CO2 SERPL-SCNC: 31 MMOL/L (ref 21–32)
CREAT SERPL-MCNC: 1.18 MG/DL (ref 0.6–1.3)
GFR SERPL CREATININE-BSD FRML MDRD: 44 ML/MIN/1.73SQ M
GLUCOSE SERPL-MCNC: 193 MG/DL (ref 65–140)
MAGNESIUM SERPL-MCNC: 1.5 MG/DL (ref 1.9–2.7)
POTASSIUM SERPL-SCNC: 3.6 MMOL/L (ref 3.5–5.3)
SODIUM SERPL-SCNC: 142 MMOL/L (ref 135–147)

## 2024-02-23 PROCEDURE — 94760 N-INVAS EAR/PLS OXIMETRY 1: CPT

## 2024-02-23 PROCEDURE — 94640 AIRWAY INHALATION TREATMENT: CPT

## 2024-02-23 PROCEDURE — 80048 BASIC METABOLIC PNL TOTAL CA: CPT | Performed by: STUDENT IN AN ORGANIZED HEALTH CARE EDUCATION/TRAINING PROGRAM

## 2024-02-23 PROCEDURE — 99232 SBSQ HOSP IP/OBS MODERATE 35: CPT | Performed by: STUDENT IN AN ORGANIZED HEALTH CARE EDUCATION/TRAINING PROGRAM

## 2024-02-23 PROCEDURE — 94664 DEMO&/EVAL PT USE INHALER: CPT

## 2024-02-23 PROCEDURE — 71045 X-RAY EXAM CHEST 1 VIEW: CPT

## 2024-02-23 PROCEDURE — 83735 ASSAY OF MAGNESIUM: CPT | Performed by: STUDENT IN AN ORGANIZED HEALTH CARE EDUCATION/TRAINING PROGRAM

## 2024-02-23 PROCEDURE — 99232 SBSQ HOSP IP/OBS MODERATE 35: CPT | Performed by: INTERNAL MEDICINE

## 2024-02-23 RX ORDER — LIDOCAINE 50 MG/G
1 PATCH TOPICAL ONCE
Status: COMPLETED | OUTPATIENT
Start: 2024-02-23 | End: 2024-02-23

## 2024-02-23 RX ORDER — METHYLPREDNISOLONE SODIUM SUCCINATE 40 MG/ML
40 INJECTION, POWDER, LYOPHILIZED, FOR SOLUTION INTRAMUSCULAR; INTRAVENOUS EVERY 12 HOURS SCHEDULED
Status: DISCONTINUED | OUTPATIENT
Start: 2024-02-23 | End: 2024-02-25

## 2024-02-23 RX ORDER — FUROSEMIDE 10 MG/ML
20 INJECTION INTRAMUSCULAR; INTRAVENOUS ONCE
Status: DISCONTINUED | OUTPATIENT
Start: 2024-02-23 | End: 2024-02-23

## 2024-02-23 RX ORDER — LORAZEPAM 0.5 MG/1
0.5 TABLET ORAL 2 TIMES DAILY PRN
Status: DISCONTINUED | OUTPATIENT
Start: 2024-02-23 | End: 2024-03-07 | Stop reason: HOSPADM

## 2024-02-23 RX ORDER — MAGNESIUM SULFATE HEPTAHYDRATE 40 MG/ML
2 INJECTION, SOLUTION INTRAVENOUS ONCE
Status: COMPLETED | OUTPATIENT
Start: 2024-02-23 | End: 2024-02-23

## 2024-02-23 RX ADMIN — ZANUBRUTINIB 160 MG: 80 CAPSULE, GELATIN COATED ORAL at 08:29

## 2024-02-23 RX ADMIN — IPRATROPIUM BROMIDE 0.5 MG: 0.5 SOLUTION RESPIRATORY (INHALATION) at 20:16

## 2024-02-23 RX ADMIN — BUDESONIDE AND FORMOTEROL FUMARATE DIHYDRATE 2 PUFF: 160; 4.5 AEROSOL RESPIRATORY (INHALATION) at 17:41

## 2024-02-23 RX ADMIN — METHYLPREDNISOLONE SODIUM SUCCINATE 40 MG: 40 INJECTION, POWDER, FOR SOLUTION INTRAMUSCULAR; INTRAVENOUS at 16:07

## 2024-02-23 RX ADMIN — ACETAMINOPHEN 975 MG: 325 TABLET, FILM COATED ORAL at 04:23

## 2024-02-23 RX ADMIN — LIDOCAINE 5% 2 PATCH: 700 PATCH TOPICAL at 18:36

## 2024-02-23 RX ADMIN — PANTOPRAZOLE SODIUM 40 MG: 40 TABLET, DELAYED RELEASE ORAL at 08:32

## 2024-02-23 RX ADMIN — DOCUSATE SODIUM 100 MG: 100 CAPSULE, LIQUID FILLED ORAL at 18:30

## 2024-02-23 RX ADMIN — ALBUTEROL SULFATE 2 PUFF: 90 AEROSOL, METERED RESPIRATORY (INHALATION) at 15:44

## 2024-02-23 RX ADMIN — ZANUBRUTINIB 160 MG: 80 CAPSULE, GELATIN COATED ORAL at 18:30

## 2024-02-23 RX ADMIN — ACETAMINOPHEN 975 MG: 325 TABLET, FILM COATED ORAL at 16:10

## 2024-02-23 RX ADMIN — ALBUTEROL SULFATE 2.5 MG: 2.5 SOLUTION RESPIRATORY (INHALATION) at 17:43

## 2024-02-23 RX ADMIN — MIDODRINE HYDROCHLORIDE 2.5 MG: 5 TABLET ORAL at 16:10

## 2024-02-23 RX ADMIN — PRAVASTATIN SODIUM 80 MG: 80 TABLET ORAL at 18:31

## 2024-02-23 RX ADMIN — MAGNESIUM SULFATE HEPTAHYDRATE 2 G: 40 INJECTION, SOLUTION INTRAVENOUS at 10:10

## 2024-02-23 RX ADMIN — LEVALBUTEROL HYDROCHLORIDE 1.25 MG: 1.25 SOLUTION RESPIRATORY (INHALATION) at 20:16

## 2024-02-23 RX ADMIN — MIDODRINE HYDROCHLORIDE 2.5 MG: 5 TABLET ORAL at 08:31

## 2024-02-23 RX ADMIN — ALBUTEROL SULFATE 2 PUFF: 90 AEROSOL, METERED RESPIRATORY (INHALATION) at 04:24

## 2024-02-23 RX ADMIN — LIDOCAINE 5% 1 PATCH: 700 PATCH TOPICAL at 05:52

## 2024-02-23 RX ADMIN — DOCUSATE SODIUM 100 MG: 100 CAPSULE, LIQUID FILLED ORAL at 08:32

## 2024-02-23 RX ADMIN — BUDESONIDE AND FORMOTEROL FUMARATE DIHYDRATE 2 PUFF: 160; 4.5 AEROSOL RESPIRATORY (INHALATION) at 10:15

## 2024-02-23 RX ADMIN — LEVALBUTEROL HYDROCHLORIDE 1.25 MG: 1.25 SOLUTION RESPIRATORY (INHALATION) at 07:10

## 2024-02-23 RX ADMIN — CARVEDILOL 25 MG: 12.5 TABLET, FILM COATED ORAL at 16:10

## 2024-02-23 RX ADMIN — VILAZODONE HYDROCHLORIDE 40 MG: 20 TABLET ORAL at 08:31

## 2024-02-23 RX ADMIN — ASPIRIN 81 MG: 81 TABLET, COATED ORAL at 08:32

## 2024-02-23 RX ADMIN — IPRATROPIUM BROMIDE 0.5 MG: 0.5 SOLUTION RESPIRATORY (INHALATION) at 07:10

## 2024-02-23 RX ADMIN — BUPROPION HYDROCHLORIDE 75 MG: 75 TABLET, FILM COATED ORAL at 08:32

## 2024-02-23 RX ADMIN — LORAZEPAM 0.5 MG: 0.5 TABLET ORAL at 16:06

## 2024-02-23 RX ADMIN — BARICITINIB 2 MG: 2 TABLET, FILM COATED ORAL at 08:30

## 2024-02-23 RX ADMIN — GUAIFENESIN AND DEXTROMETHORPHAN 10 ML: 100; 10 SYRUP ORAL at 10:15

## 2024-02-23 RX ADMIN — MIDODRINE HYDROCHLORIDE 2.5 MG: 5 TABLET ORAL at 11:51

## 2024-02-23 NOTE — ASSESSMENT & PLAN NOTE
Continue home meds; initiate lisinopril as part of NSTEMI management  Continue carvedilol  PRN IV Labetalol for SBP > 160  Current pressure a little soft, lasix held  Continue to monitor blood pressure

## 2024-02-23 NOTE — ASSESSMENT & PLAN NOTE
Lab Results   Component Value Date    SARSCOV2 Positive (A) 02/15/2024    SARSCOV2 Negative 12/12/2023    SARSCOV2 Negative 10/28/2023       Symptoms of COVID-19 SYMPTOMS: shortness of breath and Tachycardia (more than 100 beats per min)  Vaccine status: vaccinated  The patient does have acute hypoxemic respiratory failure given the same    CT chest w contrast showing Scattered areas of tree-in-bud opacity consistent with endobronchial impaction/infection      S/p remdesevir, Dexamethasone for 10 days  Continue baricitinib given worsening oxygen requirement on 2/16  Oxygen requirements back to baseline, cough improving, afebrile  Had episode of hypoxia overnight, but resolved after  given neb treatments.  Continue to monitor  Patient likely will need rehab stay after discharge

## 2024-02-23 NOTE — CASE MANAGEMENT
Case Management Discharge Planning Note    Patient name Lauren Quintero  Location /-01 MRN [id number]  : 1945 Date 2024       Current Admission Date: 2/15/2024  Current Admission Diagnosis:NSTEMI (non-ST elevated myocardial infarction) (HCC)   Patient Active Problem List    Diagnosis Date Noted    CKD (chronic kidney disease) 2024    NSTEMI (non-ST elevated myocardial infarction) (HCC) 02/15/2024    COVID-19 02/15/2024    Dizziness 2023    Social problem 2023    Acute on chronic respiratory failure with hypoxia and hypercapnia (HCC) 2023    Diverticulitis 10/29/2023    Moderate protein-calorie malnutrition (HCC) 2023    BRBPR (bright red blood per rectum) 2023    Generalized weakness 2023    Staring episodes 2023    Waldenstrom macroglobulinemia (HCC) 2023    Severe protein-calorie malnutrition (HCC) 2021    Positive blood culture 2021    COPD without exacerbation (HCC) 2021    Anemia 2021    Abnormal computed tomography angiography (CTA) 2021    Fatigue 2021    Major depressive disorder, recurrent episode, moderate (HCC) 2019    Flank pain 2019    CAD (coronary artery disease), native coronary artery 2018    Acute on chronic respiratory failure with hypoxia (HCC) 2018    Ambulatory dysfunction 2018    Hypokalemia 2018    Essential hypertension 2018    Tobacco abuse 2018      LOS (days): 8  Geometric Mean LOS (GMLOS) (days): 5.1  Days to GMLOS:-2.5     OBJECTIVE:  Risk of Unplanned Readmission Score: 43.62     Current admission status: Inpatient   Preferred Pharmacy:   [facility]/pharmacy #[id number] - [place] - [address]  [address]  [place] [address]  Phone: [phone] Fax: [phone]    [facility] - [place] - [address]  [address]  [address]  [place] [address]  Phone: [phone] Fax:  [phone]    Primary Care Provider: Starla Bateman MD  Primary Insurance: MEDICARE  Secondary Insurance:     DISCHARGE DETAILS:    Discharge planning discussed with:: Patient via phone.  Freedom of Choice: Yes  Comments - Freedom of Choice: FOC maintained - CM reviewed IMM and DCP.  Patient scheduled for d/c to [facility], pending life vest/GOC.  CM contacted family/caregiver?: Yes (Attempted to reach son via phone, unable to leave message.)  Were Treatment Team discharge recommendations reviewed with patient/caregiver?: Yes  Did patient/caregiver verbalize understanding of patient care needs?: N/A- going to facility  Were patient/caregiver advised of the risks associated with not following Treatment Team discharge recommendations?: Yes    Contacts  Patient Contacts: Imer (son)  Relationship to Patient:: Family  Contact Method: Phone  Phone Number: [phone]  Reason/Outcome: Continuity of Care, Discharge Planning    Requested Home Health Care         Is the patient interested in HHC at discharge?: No    DME Referral Provided  Referral made for DME?: No    IMM Given (Date):: 02/23/24  IMM Given to:: Patient     Additional Comments: Verbal review of IMM via phone due to Covid.  Patient reports understanding, questions answered.  Original to medical records.  Patient copy provided via nursing.

## 2024-02-23 NOTE — ASSESSMENT & PLAN NOTE
Lab Results   Component Value Date    EGFR 44 02/23/2024    EGFR 59 02/22/2024    EGFR 48 02/21/2024    CREATININE 1.18 02/23/2024    CREATININE 0.93 02/22/2024    CREATININE 1.11 02/21/2024     Pt has a history of CKD stage 2  Transitioned to po lasix 20 mg daily   Cont to monitor bmp

## 2024-02-23 NOTE — PROGRESS NOTES
[facility]  Progress Note  Name: Lauren Quintero I  MRN: [id number]  Unit/Bed#: -01 I Date of Admission: 2/15/2024   Date of Service: 2/23/2024 I Hospital Day: 8    Assessment/Plan   CKD (chronic kidney disease)  Assessment & Plan  Lab Results   Component Value Date    EGFR 44 02/23/2024    EGFR 59 02/22/2024    EGFR 48 02/21/2024    CREATININE 1.18 02/23/2024    CREATININE 0.93 02/22/2024    CREATININE 1.11 02/21/2024     Pt has a history of CKD stage 2  Transitioned to po lasix 20 mg daily   Cont to monitor bmp    COVID-19  Assessment & Plan  Lab Results   Component Value Date    SARSCOV2 Positive (A) 02/15/2024    SARSCOV2 Negative 12/12/2023    SARSCOV2 Negative 10/28/2023       Symptoms of COVID-19 SYMPTOMS: shortness of breath and Tachycardia (more than 100 beats per min)  Vaccine status: vaccinated  The patient does have acute hypoxemic respiratory failure given the same    CT chest w contrast showing Scattered areas of tree-in-bud opacity consistent with endobronchial impaction/infection      S/p remdesevir, Dexamethasone for 10 days  Continue baricitinib given worsening oxygen requirement on 2/16  Oxygen requirements back to baseline, cough improving, afebrile  Had episode of hypoxia overnight, but resolved after  given neb treatments.  Continue to monitor  Patient likely will need rehab stay after discharge    COPD without exacerbation (HCC)  Assessment & Plan  Not wheezing but SOB due to COVID  Continue with mild covid pathway     Major depressive disorder, recurrent episode, moderate (HCC)  Assessment & Plan  Continue wellbutrin, vilazodone  Also takes Vraylar; recommend bringing from home as we do not have it on formulary - patient now has it     Acute on chronic respiratory failure with hypoxia (HCC)  Assessment & Plan  Currently SpO2: 91 % on Nasal Cannula O2 Flow Rate (L/min): 5 L/min    At baseline, uses 4L NC  CT Imaging showing mild pulm edema, scattered areas of  tree-in-bud opacity  Continue to treat underlying NSTEMI, COVID, mild pulm edema  Overnight pt complained of right sided chest pain, had episode of desaturating into the 80s, was placed on nonrebreather. Currently satting well on 5L NC.      Essential hypertension  Assessment & Plan  Continue home meds; initiate lisinopril as part of NSTEMI management  Continue carvedilol  PRN IV Labetalol for SBP > 160  Current pressure a little soft, lasix held  Continue to monitor blood pressure          * NSTEMI (non-ST elevated myocardial infarction) (HCC)  Assessment & Plan  Lab Results   Component Value Date    HSTNI0 2,584 (H) 02/17/2024    HSTNI2 2,049 (H) 02/17/2024    HSTNID2 -535 02/17/2024      CT chest showing no evidence of pulmonary embolism, did note mild pulmonary edema  No chest pain present, but significant SOB, anginal equivalent, sudden onset, fairly elevated troponins  EKG: Sinus Tachycardia with no obvious STEMI  Suspect a true type I NSTEMI  ADRIEN Score: 5  The patient's presentation with shortness of breath was somewhat sudden according to the son, he was driving her home from the rehab facility if she got acutely short of breath and hypoxemic again.  Certainly it is possible that the COVID infection may have triggered some degree of underlying CAD leading to an NSTEMI.  Can stop heparin drip  Cardiology following.  Cardiac cath showed significant triple vessel disease with 90% stenosis in the mid LAD, 80% stenosis in the proximal LAD, 85% stenosis in the mid Cx, and 70% in the RCA with a patent mid RCA stent. Cardiology recommends maximizing medical therapy for her CAD and follow-up outpatient with a CT surgeon for a potential CABG once she fully recovers from COVID.  Given new reduced EF of 35%, will discuss LifeVest prior to discharge. Ongoing [facility] discussion for lifevest vs palliative care  Will d/c on PO Lasix 20mg daily                   VTE Pharmacologic Prophylaxis: VTE Score: 7 High Risk (Score >/=  5) - Pharmacological DVT Prophylaxis Contraindicated. Sequential Compression Devices Ordered.    Mobility:   Basic Mobility Inpatient Raw Score: 18  JH-HLM Goal: 6: Walk 10 steps or more  JH-HLM Achieved: 2: Bed activities/Dependent transfer  HLM Goal NOT achieved. Continue with multidisciplinary rounding and encourage appropriate mobility to improve upon HLM goals.    Patient Centered Rounds: I performed bedside rounds with nursing staff today.  Discussions with Specialists or Other Care Team Provider:  IP CONSULT TO CARDIOLOGY      Education and Discussions with Family / Patient:     Current Length of Stay: 8 day(s)  Current Patient Status: Inpatient   Certification Statement: The patient will continue to require additional inpatient hospital stay due to plan as noted above  Discharge Plan: Anticipate discharge in 24-48 hrs to discharge location to be determined pending rehab evaluations.    Code Status: Level 3 - DNAR and DNI    Subjective:   Pt is resting comfortably in bed. Denies any CP, SOB, palpitations, abd pain, dysuria. Complains of some right sided chest pain that is tender to palpation. Lidocaine patch helping to relieve pain. All other questions answered, no other complaints.     Objective:     Vitals:   Temp (24hrs), Av.9 °F (36.6 °C), Min:97.8 °F (36.6 °C), Max:98 °F (36.7 °C)    Temp:  [97.8 °F (36.6 °C)-98 °F (36.7 °C)] 98 °F (36.7 °C)  HR:  [] 88  Resp:  [18-25] 18  BP: (110-153)/(58-78) 115/61  SpO2:  [90 %-99 %] 91 %  Body mass index is 20.07 kg/m².     Input and Output Summary (last 24 hours):   No intake or output data in the 24 hours ending 24 1303      Physical Exam:   Physical Exam  Constitutional:       Appearance: Normal appearance. She is normal weight.   HENT:      Head: Normocephalic and atraumatic.      Right Ear: External ear normal.      Left Ear: External ear normal.      Nose: Nose normal.      Mouth/Throat:      Mouth: Mucous membranes are moist.      Pharynx:  Oropharynx is clear.   Eyes:      Extraocular Movements: Extraocular movements intact.      Conjunctiva/sclera: Conjunctivae normal.   Cardiovascular:      Rate and Rhythm: Normal rate and regular rhythm.      Pulses: Normal pulses.      Heart sounds: Normal heart sounds.   Pulmonary:      Effort: Pulmonary effort is normal.      Breath sounds: Normal breath sounds.   Chest:      Chest wall: Tenderness present.   Abdominal:      General: Bowel sounds are normal.      Palpations: Abdomen is soft.   Skin:     General: Skin is warm and dry.      Capillary Refill: Capillary refill takes less than 2 seconds.   Neurological:      General: No focal deficit present.      Mental Status: She is alert and oriented to person, place, and time.            Additional Data:     Labs:  Results from last 7 days   Lab Units 02/22/24  0447 02/21/24  0556 02/20/24  0622   WBC Thousand/uL 14.56*   < > 4.08*   HEMOGLOBIN g/dL 12.0   < > 11.5   HEMATOCRIT % 38.2   < > 35.5   PLATELETS Thousands/uL 187   < > 188   NEUTROS PCT %  --   --  79*   LYMPHS PCT %  --   --  15   MONOS PCT %  --   --  5   EOS PCT %  --   --  0    < > = values in this interval not displayed.     Results from last 7 days   Lab Units 02/23/24  0600 02/18/24  0448 02/17/24  0444   SODIUM mmol/L 142   < > 143   POTASSIUM mmol/L 3.6   < > 3.6   CHLORIDE mmol/L 101   < > 106   CO2 mmol/L 31   < > 29   BUN mg/dL 37*   < > 24   CREATININE mg/dL 1.18   < > 0.88   ANION GAP mmol/L 10   < > 8   CALCIUM mg/dL 9.8   < > 9.0   ALBUMIN g/dL  --   --  4.2   TOTAL BILIRUBIN mg/dL  --   --  0.40   ALK PHOS U/L  --   --  57   ALT U/L  --   --  7   AST U/L  --   --  27   GLUCOSE RANDOM mg/dL 193*   < > 138    < > = values in this interval not displayed.         Results from last 7 days   Lab Units 02/20/24  1628   POC GLUCOSE mg/dl 91               Lines/Drains:  Invasive Devices       Peripheral Intravenous Line  Duration             Peripheral IV 02/19/24 Distal;Dorsal  (posterior);Right Forearm 4 days                      Telemetry:  Telemetry Orders (From admission, onward)               24 Hour Telemetry Monitoring  Continuous x 24 Hours (Telem)        Question:  Reason for 24 Hour Telemetry  Answer:  PCI/EP study (including pacer and ICD implementation), Cardiac surgery, MI, abnormal cardiac cath, and chest pain- rule out MI                     Telemetry Reviewed: Normal Sinus Rhythm  Indication for Continued Telemetry Use: Acute MI/Unstable Angina/Rule out ACS             Imaging: Reviewed radiology reports from this admission including:   XR chest 1 view portable    Result Date: 2/16/2024  Impression: Mild pulmonary interstitial edema. Workstation performed: [id number]     CT chest with contrast    Result Date: 2/15/2024  Impression: No evidence for pulmonary embolism. Mild pulmonary edema. Scattered areas of tree-in-bud opacity consistent with endobronchial impaction/infection. Workstation performed: [id number]       No Chest XR results available for this patient.     Results for orders placed during the hospital encounter of 02/15/24    Echo complete w/ contrast if indicated    Interpretation Summary    Left Ventricle: Left ventricular cavity size is normal. Wall thickness is mildly increased. There is mild asymmetric hypertrophy of the basal septal wall. The left ventricular ejection fraction is 35%. Systolic function is severely reduced. Diastolic function is mildly abnormal, consistent with grade I (abnormal) relaxation.    The following segments are hypokinetic: mid anteroseptal, mid inferoseptal, apical anterior, apical septal, apical inferior and apex.    Mitral Valve: There is severe annular calcification.    Tricuspid Valve: There is mild regurgitation.    IVC/SVC: The inferior vena cava is dilated.    Pulmonary Artery: The estimated pulmonary artery systolic pressure is 40.0 mmHg. The pulmonary artery systolic pressure is mildly increased.      Recent Cultures (last  7 days):   Results from last 7 days   Lab Units 02/20/24  1616   BLOOD CULTURE  No Growth at 48 hrs.  No Growth at 48 hrs.       Last 24 Hours Medication List:   Current Facility-Administered Medications   Medication Dose Route Frequency Provider Last Rate    acetaminophen  975 mg Oral Q8H PRN Vasu Ramos PA-C      albuterol  2 puff Inhalation Q4H PRN Vasu Ramos PA-C      albuterol  2.5 mg Nebulization Q4H PRN Vasu Ramos PA-C      aluminum-magnesium hydroxide-simethicone  30 mL Oral Q6H PRN Vasu Ramos PA-C      aspirin  81 mg Oral Daily Vasu Ramos PA-C      baricitinib  2 mg Oral Q24H Vasu Ramos PA-C      budesonide-formoterol  2 puff Inhalation BID Vasu Ramos PA-C      buPROPion  75 mg Oral Daily Vasu Ramos PA-C      carvedilol  25 mg Oral BID With Meals Vasu Ramos PA-C      dextromethorphan-guaiFENesin  10 mL Oral Q6H PRN Vasu Ramos PA-C      docusate sodium  100 mg Oral BID Vasu Ramos PA-C      furosemide  20 mg Oral Daily Vasu Ramos PA-C      ipratropium  0.5 mg Nebulization BID Vasu Ramos PA-C      labetalol  10 mg Intravenous Q6H PRN Vasu Ramos PA-C      levalbuterol  1.25 mg Nebulization BID Vasu Ramos PA-C      lidocaine  1 patch Topical Once Stephanie Day PA-C      lidocaine  2 patch Topical Daily PRN Vasu Ramos PA-C      melatonin  3 mg Oral HS PRN Vasu Ramos PA-C      midodrine  2.5 mg Oral TID AC Vasu Ramos PA-C      nitroglycerin  0.4 mg Sublingual Q5 Min PRN Vasu Ramos PA-C      ondansetron  4 mg Intravenous Q6H PRN Vasu Ramos PA-C      pantoprazole  40 mg Oral Daily Vasu Ramos PA-C      pravastatin  80 mg Oral QPM Vasu Ramos PA-C      vilazodone  40 mg Oral Daily With Breakfast Vasu Ramos PA-C      Zanubrutinib  160 mg Oral BID  Vasu Ramos PA-C          Today, Patient Was Seen By: Latia Cortes DO and the attending physician, Latia Cortes DO        **Please Note: This note may have been constructed using a voice recognition system.**

## 2024-02-23 NOTE — TELEPHONE ENCOUNTER
----- Message from Vasu Ramos PA-C sent at 2/23/2024  3:34 PM EST -----  Regarding: Hospital Follow-Up  Cardiology Follow-up:    This patient was initially admitted with COVID-19 infection.  She was found to have acute on chronic HFrEF and multivessel CAD during hospitalization.    Please schedule patient for follow-up with an AP in 1 to 3 days after discharge per heart failure protocol.  This appointment may be virtual if needed due to COVID-19 infection.    Please schedule an additional appointment in 1 month, preferably with a physician, to discuss management of multivessel CAD.     Thank you

## 2024-02-23 NOTE — PLAN OF CARE
Problem: Potential for Falls  Goal: Patient will remain free of falls  Description: INTERVENTIONS:  - Educate patient/family on patient safety including physical limitations  - Instruct patient to call for assistance with activity   - Consult OT/PT to assist with strengthening/mobility   - Keep Call bell within reach  - Keep bed low and locked with side rails adjusted as appropriate  - Keep care items and personal belongings within reach  - Initiate and maintain comfort rounds  - Make Fall Risk Sign visible to staff  - Offer Toileting every 2 Hours, in advance of need  - Initiate/Maintain bed alarm  - Obtain necessary fall risk management equipment  - Apply yellow socks and bracelet for high fall risk patients  - Consider moving patient to room near nurses station  Outcome: Progressing     Problem: RESPIRATORY - ADULT  Goal: Achieves optimal ventilation and oxygenation  Description: INTERVENTIONS:  - Assess for changes in respiratory status  - Assess for changes in mentation and behavior  - Position to facilitate oxygenation and minimize respiratory effort  - Oxygen administered by appropriate delivery if ordered  - Initiate smoking cessation education as indicated  - Encourage broncho-pulmonary hygiene including cough, deep breathe, Incentive Spirometry  - Assess the need for suctioning and aspirate as needed  - Assess and instruct to report SOB or any respiratory difficulty  - Respiratory Therapy support as indicated  Outcome: Progressing     Problem: Prexisting or High Potential for Compromised Skin Integrity  Goal: Skin integrity is maintained or improved  Description: INTERVENTIONS:  - Identify patients at risk for skin breakdown  - Assess and monitor skin integrity  - Assess and monitor nutrition and hydration status  - Monitor labs   - Assess for incontinence   - Turn and reposition patient  - Assist with mobility/ambulation  - Relieve pressure over bony prominences  - Avoid friction and shearing  - Provide  appropriate hygiene as needed including keeping skin clean and dry  - Evaluate need for skin moisturizer/barrier cream  - Collaborate with interdisciplinary team   - Patient/family teaching  - Consider wound care consult   Outcome: Progressing     Problem: PAIN - ADULT  Goal: Verbalizes/displays adequate comfort level or baseline comfort level  Description: Interventions:  - Encourage patient to monitor pain and request assistance  - Assess pain using appropriate pain scale  - Administer analgesics based on type and severity of pain and evaluate response  - Implement non-pharmacological measures as appropriate and evaluate response  - Consider cultural and social influences on pain and pain management  - Notify physician/advanced practitioner if interventions unsuccessful or patient reports new pain  Outcome: Progressing     Problem: INFECTION - ADULT  Goal: Absence or prevention of progression during hospitalization  Description: INTERVENTIONS:  - Assess and monitor for signs and symptoms of infection  - Monitor lab/diagnostic results  - Monitor all insertion sites, i.e. indwelling lines, tubes, and drains  - Monitor endotracheal if appropriate and nasal secretions for changes in amount and color  - [place] appropriate cooling/warming therapies per order  - Administer medications as ordered  - Instruct and encourage patient and family to use good hand hygiene technique  - Identify and instruct in appropriate isolation precautions for identified infection/condition  Outcome: Progressing  Goal: Absence of fever/infection during neutropenic period  Description: INTERVENTIONS:  - Monitor WBC    Outcome: Progressing     Problem: SAFETY ADULT  Goal: Patient will remain free of falls  Description: INTERVENTIONS:  - Educate patient/family on patient safety including physical limitations  - Instruct patient to call for assistance with activity   - Consult OT/PT to assist with strengthening/mobility   - Keep Call bell  within reach  - Keep bed low and locked with side rails adjusted as appropriate  - Keep care items and personal belongings within reach  - Initiate and maintain comfort rounds  - Make Fall Risk Sign visible to staff  - Offer Toileting every 2 Hours, in advance of need  - Initiate/Maintain bed alarm  - Obtain necessary fall risk management equipment  - Apply yellow socks and bracelet for high fall risk patients  - Consider moving patient to room near nurses station  Outcome: Progressing  Goal: Maintain or return to baseline ADL function  Description: INTERVENTIONS:  -  Assess patient's ability to carry out ADLs; assess patient's baseline for ADL function and identify physical deficits which impact ability to perform ADLs (bathing, care of mouth/teeth, toileting, grooming, dressing, etc.)  - Assess/evaluate cause of self-care deficits   - Assess range of motion  - Assess patient's mobility; develop plan if impaired  - Assess patient's need for assistive devices and provide as appropriate  - Encourage maximum independence but intervene and supervise when necessary  - Involve family in performance of ADLs  - Assess for home care needs following discharge   - Consider OT consult to assist with ADL evaluation and planning for discharge  - Provide patient education as appropriate  Outcome: Progressing  Goal: Maintains/Returns to pre admission functional level  Description: INTERVENTIONS:  - Perform AM-PAC 6 Click Basic Mobility/ Daily Activity assessment daily.  - Set and communicate daily mobility goal to care team and patient/family/caregiver.   - Collaborate with rehabilitation services on mobility goals if consulted  - Perform Range of Motion 3 times a day.  - Reposition patient every 2 hours.  - Dangle patient 3 times a day  - Stand patient 3 times a day  - Ambulate patient 3 times a day  - Out of bed to chair 3 times a day   - Out of bed for meals 3 times a day  - Out of bed for toileting  - Record patient progress  and toleration of activity level   Outcome: Progressing     Problem: DISCHARGE PLANNING  Goal: Discharge to home or other facility with appropriate resources  Description: INTERVENTIONS:  - Identify barriers to discharge w/patient and caregiver  - Arrange for needed discharge resources and transportation as appropriate  - Identify discharge learning needs (meds, wound care, etc.)  - Arrange for interpretive services to assist at discharge as needed  - Refer to Case Management Department for coordinating discharge planning if the patient needs post-hospital services based on physician/advanced practitioner order or complex needs related to functional status, cognitive ability, or social support system  Outcome: Progressing     Problem: Knowledge Deficit  Goal: Patient/family/caregiver demonstrates understanding of disease process, treatment plan, medications, and discharge instructions  Description: Complete learning assessment and assess knowledge base.  Interventions:  - Provide teaching at level of understanding  - Provide teaching via preferred learning methods  Outcome: Progressing     Problem: MOBILITY - ADULT  Goal: Maintain or return to baseline ADL function  Description: INTERVENTIONS:  -  Assess patient's ability to carry out ADLs; assess patient's baseline for ADL function and identify physical deficits which impact ability to perform ADLs (bathing, care of mouth/teeth, toileting, grooming, dressing, etc.)  - Assess/evaluate cause of self-care deficits   - Assess range of motion  - Assess patient's mobility; develop plan if impaired  - Assess patient's need for assistive devices and provide as appropriate  - Encourage maximum independence but intervene and supervise when necessary  - Involve family in performance of ADLs  - Assess for home care needs following discharge   - Consider OT consult to assist with ADL evaluation and planning for discharge  - Provide patient education as appropriate  Outcome:  Progressing  Goal: Maintains/Returns to pre admission functional level  Description: INTERVENTIONS:  - Perform AM-PAC 6 Click Basic Mobility/ Daily Activity assessment daily.  - Set and communicate daily mobility goal to care team and patient/family/caregiver.   - Collaborate with rehabilitation services on mobility goals if consulted  - Perform Range of Motion 3 times a day.  - Reposition patient every 2 hours.  - Dangle patient 3 times a day  - Stand patient 3 times a day  - Ambulate patient 3 times a day  - Out of bed to chair 3 times a day   - Out of bed for meals 3 times a day  - Out of bed for toileting  - Record patient progress and toleration of activity level   Outcome: Progressing

## 2024-02-23 NOTE — PROGRESS NOTES
Cardiology Progress Note - Lauren Quintero 78 y.o. female MRN: [id number]    Unit/Bed#: -01 Encounter: [id number]      Assessment/Plan:   Elevated troponin-multivessel CAD s/p DANIELLA to the 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  The following segments are hypokinetic: Mid anteroseptal, mid inferoseptal, apical anterior, apical septal, apical inferior, and apex.  Cardiac cath 2/21/2024: Three-vessel CAD with patent mid RCA stent.  Proximal to mid LAD 80% stenosed, mid LAD 90% stenosed, mid circumflex 85% stenosed, first marginal 75% stenosed, mid RCA 70% stenosed.  Continue aspirin, Coreg 25 mg twice daily, statin.  Discussed with patient that options for management of her multivessel CAD include medical management alone, high risk PCI, or evaluation for CABG.  Given current clinical status, recommend reevaluation of her clinical status in 1 month to allow time to recover from COVID-19 infection and hospitalization and repeat discussion of options for CAD management at that time.  Plan discussed with patient, patient agreeable.  Will continue medical management at this time.    2.  New ischemic cardiomyopathy-EF 35%  Appears euvolemic on exam.  See #3  Continue Coreg 25 mg twice daily  Will hold off on further GDMT at this point given her current clinical status.  Will reevaluate the addition of further GDMT in the outpatient setting pending improved clinical status.    Will plan to discuss Life Vest with patient over the weekend.       3.  Acute on chronic HFrEF  Etiology: Ischemic cardiomyopathy per #2  Appears euvolemic on exam  Medication Regiment Includes:   Diuretic: Continue Lasix 20 mg p.o. once daily  Continue Coreg 25 mg twice daily  Will hold off on further GDMT at this point given her  "current clinical status.  Will reevaluate the addition of further GDMT in the outpatient setting pending improved clinical status.  Recommend daily weights, strict I's and O's, low-sodium diet.  Recommend continuing to monitor and replete electrolytes as needed.    4.  Paroxysmal SVT  Continue Coreg 25 mg twice daily    5.  Sepsis/COVID-19  6.  Acute on chronic hypoxic respiratory failure  7.  COPD  8.  Hypertension  She has intermittently had hypotension and soft BPs while in the hospital.  BP stable today.  Continue Coreg, Lasix, midodrine 2.5 mg 3 times daily     9.  Hyperlipidemia  Continue statin    10.  CKD  11.  IFG        Patient to follow-up with cardiology outpatient 24 to 72 hours after discharge for follow-up of acute on chronic HFrEF.  She is additionally to follow-up in about 1 month to reevaluate and discuss management of multivessel CAD.      Subjective:   Patient seen and examined.  She offers no cardiac concerns or complaints at this time.  She denies chest pain/pressure, shortness of breath, palpitations, lightheadedness, dizziness, syncope, lower extremity edema.  She endorses difficulty sleeping, but states this is not due to difficulty breathing, palpitations, chest discomfort.  She denies orthopnea or PND.    She states she has been experiencing abdominal pain since last night.  Overnight she did have an episode of right-sided abdominal pain associated with tachypnea, desaturation into the high 80s.  She was treated with albuterol nebulizer and placed on a non-rebreather, which was eventually removed and her O2 sats were noted to have improved.     Objective:     Vitals: Blood pressure 159/71, pulse 88, temperature 98.2 °F (36.8 °C), temperature source Oral, resp. rate 18, height 5' 8\" (1.727 m), weight 59.9 kg (132 lb), SpO2 91%, not currently breastfeeding., Body mass index is 20.07 kg/m².,   Orthostatic Blood Pressures      Flowsheet Row Most Recent Value   Blood Pressure 159/71 filed at " 02/23/2024 1500   Patient Position - Orthostatic VS Sitting filed at 02/23/2024 1500            No intake or output data in the 24 hours ending 02/23/24 1531      Physical Exam:   Physical Exam: Patient room not entered due to isolation precautions. Examination from hallway and discussion with patient over phone revealed:   GEN: Alert and oriented x 3, in no acute distress.  Ill-appearing.   LUNGS: On O2 via nasal cannula.  No signs of respiratory distress.  NEURO: Normal speech.  Mood and affect normal.    Telemetry:  Telemetry Orders (From admission, onward)               24 Hour Telemetry Monitoring  Continuous x 24 Hours (Telem)        Question:  Reason for 24 Hour Telemetry  Answer:  PCI/EP study (including pacer and ICD implementation), Cardiac surgery, MI, abnormal cardiac cath, and chest pain- rule out MI                     Telemetry Reviewed:  Sinus rhythm, PVCs, 10 beat run of SVT.      Medications:      Current Facility-Administered Medications:     acetaminophen (TYLENOL) tablet 975 mg, 975 mg, Oral, Q8H PRN, aVsu Ramos PA-C, 975 mg at 02/23/24 0423    albuterol (PROVENTIL HFA,VENTOLIN HFA) inhaler 2 puff, 2 puff, Inhalation, Q4H PRN, Vasu Ramos PA-C, 2 puff at 02/23/24 0424    albuterol inhalation solution 2.5 mg, 2.5 mg, Nebulization, Q4H PRN, Vasu Ramos PA-C, 2.5 mg at 02/20/24 1406    aluminum-magnesium hydroxide-simethicone (MAALOX) oral suspension 30 mL, 30 mL, Oral, Q6H PRN, Vasu Ramos PA-C    aspirin (ECOTRIN LOW STRENGTH) EC tablet 81 mg, 81 mg, Oral, Daily, Vasu Ramos PA-C, 81 mg at 02/23/24 0832    baricitinib (OLUMIANT) tablet 2 mg, 2 mg, Oral, Q24H, Vasu Ramos PA-C, 2 mg at 02/23/24 0830    budesonide-formoterol (SYMBICORT) 160-4.5 mcg/act inhaler 2 puff, 2 puff, Inhalation, BID, Vasu Ramos PA-C, 2 puff at 02/23/24 1015    buPROPion (WELLBUTRIN) tablet 75 mg, 75 mg, Oral, Daily, Vasu Ramos PA-C,  75 mg at 02/23/24 0832    carvedilol (COREG) tablet 25 mg, 25 mg, Oral, BID With Meals, MIRIAN Oswald-C, 25 mg at 02/22/24 1610    dextromethorphan-guaiFENesin (ROBITUSSIN DM) oral syrup 10 mL, 10 mL, Oral, Q6H PRN, MIRIAN Oswald-C, 10 mL at 02/23/24 1015    docusate sodium (COLACE) capsule 100 mg, 100 mg, Oral, BID, MIRIAN Oswald-C, 100 mg at 02/23/24 0832    furosemide (LASIX) tablet 20 mg, 20 mg, Oral, Daily, MIRIAN Oswald-C    ipratropium (ATROVENT) 0.02 % inhalation solution 0.5 mg, 0.5 mg, Nebulization, BID, MIRIAN Oswald-C, 0.5 mg at 02/23/24 0710    labetalol (NORMODYNE) injection 10 mg, 10 mg, Intravenous, Q6H PRN, MIRIAN Oswald-C    levalbuterol (XOPENEX) inhalation solution 1.25 mg, 1.25 mg, Nebulization, BID, MIRIAN Oswald-C, 1.25 mg at 02/23/24 0710    lidocaine (LIDODERM) 5 % patch 1 patch, 1 patch, Topical, Once, Stephanie Day PA-C, 1 patch at 02/23/24 0552    lidocaine (LIDODERM) 5 % patch 2 patch, 2 patch, Topical, Daily PRN, MIRIAN Oswald-C, 2 patch at 02/17/24 0031    melatonin tablet 3 mg, 3 mg, Oral, HS PRN, MIRIAN Oswald-C, 3 mg at 02/22/24 2240    midodrine (PROAMATINE) tablet 2.5 mg, 2.5 mg, Oral, TID AC, Vasu Ramos PA-C, 2.5 mg at 02/23/24 1151    nitroglycerin (NITROSTAT) SL tablet 0.4 mg, 0.4 mg, Sublingual, Q5 Min PRN, Vasu Ramos PA-C, 0.4 mg at 02/17/24 0132    ondansetron (ZOFRAN) injection 4 mg, 4 mg, Intravenous, Q6H PRN, Vasu Ramos PA-C    pantoprazole (PROTONIX) EC tablet 40 mg, 40 mg, Oral, Daily, Vasu Ramos PA-C, 40 mg at 02/23/24 0832    pravastatin (PRAVACHOL) tablet 80 mg, 80 mg, Oral, QPM, Vasu Ramos PA-C, 80 mg at 02/22/24 1741    vilazodone (VIIBRYD) tablet 40 mg, 40 mg, Oral, Daily With Breakfast, Vasu Ramos PA-C, 40 mg at 02/23/24 0831    Zanubrutinib CAPS 160 mg, 160 mg, Oral, BID, Vasu  Rebeca Ramos PA-C, 160 mg at 02/23/24 0829     Labs & Results:    Results from last 7 days   Lab Units 02/17/24  0910 02/17/24  0656 02/17/24  0444   HS TNI 0HR ng/L  --   --  2,584*   HS TNI 2HR ng/L  --  2,049*  --    HS TNI 4HR ng/L 2,212*  --   --    HSTNI D4 ng/L -372  --   --      Results from last 7 days   Lab Units 02/22/24  0447 02/21/24  0556 02/20/24  0622   WBC Thousand/uL 14.56* 7.10 4.08*   HEMOGLOBIN g/dL 12.0 11.8 11.5   HEMATOCRIT % 38.2 36.3 35.5   PLATELETS Thousands/uL 187 189 188         Results from last 7 days   Lab Units 02/23/24  0600 02/22/24  0447 02/21/24  0556 02/18/24  0448 02/17/24 0444   POTASSIUM mmol/L 3.6 3.4* 3.9   < > 3.6   CHLORIDE mmol/L 101 101 108   < > 106   CO2 mmol/L 31 33* 34*   < > 29   BUN mg/dL 37* 37* 42*   < > 24   CREATININE mg/dL 1.18 0.93 1.11   < > 0.88   CALCIUM mg/dL 9.8 9.5 9.8   < > 9.0   ALK PHOS U/L  --   --   --   --  57   ALT U/L  --   --   --   --  7   AST U/L  --   --   --   --  27    < > = values in this interval not displayed.     Results from last 7 days   Lab Units 02/22/24  1142 02/22/24 0447 02/21/24  0556   PTT seconds 51* 47* 177*     Results from last 7 days   Lab Units 02/23/24  0600 02/20/24  0622   MAGNESIUM mg/dL 1.5* 2.0

## 2024-02-23 NOTE — ASSESSMENT & PLAN NOTE
Lab Results   Component Value Date    HSTNI0 2,584 (H) 02/17/2024    HSTNI2 2,049 (H) 02/17/2024    HSTNID2 -535 02/17/2024      CT chest showing no evidence of pulmonary embolism, did note mild pulmonary edema  No chest pain present, but significant SOB, anginal equivalent, sudden onset, fairly elevated troponins  EKG: Sinus Tachycardia with no obvious STEMI  Suspect a true type I NSTEMI  ADRIEN Score: 5  The patient's presentation with shortness of breath was somewhat sudden according to the son, he was driving her home from the rehab facility if she got acutely short of breath and hypoxemic again.  Certainly it is possible that the COVID infection may have triggered some degree of underlying CAD leading to an NSTEMI.  Can stop heparin drip  Cardiology following.  Cardiac cath showed significant triple vessel disease with 90% stenosis in the mid LAD, 80% stenosis in the proximal LAD, 85% stenosis in the mid Cx, and 70% in the RCA with a patent mid RCA stent. Cardiology recommends maximizing medical therapy for her CAD and follow-up outpatient with a CT surgeon for a potential CABG once she fully recovers from COVID.  Given new reduced EF of 35%, will discuss LifeVest prior to discharge. Ongoing [facility] discussion for lifevest vs palliative care  Will d/c on PO Lasix 20mg daily

## 2024-02-23 NOTE — QUICK NOTE
"Notified by RN patient complaining of right sided severe abdominal pain, also little bit more tachypneic, currently saturating in high 80s on 5 L nasal cannula, briefly placed on nonrebreather and currently saturating 97%.  All other vital signs stable.    Upon approach patient is status post albuterol nebulizer treatment, and reports she already feels her shortness of breath is improving at this time.  Patient noted to be off of nonrebreather during entire conversation on 6 L nasal cannula saturating at 91 to 92%.  She reports she feels the nebulizer treatment helped.  She also reports her biggest complaint is the fact that she has in \"annoying constant aching\" at one particular point on her right thoracic side underneath her right breast.  She denies any injury, fall or being her right side on anything.  She denies any chest pain, abdominal pain.  On exam 1 particular spot about 2 cm across on her lateral thoracic wall between her lower lower ribs patient reports tenderness to palpation.  No ecchymosis, erythema, edema noted.  Skin intact, dry.    Did discuss it could be the possibility of pain from lungs secondary to COVID infection.  Will add a lidocaine patch for now.  Continue to monitor respiratory status and discussed with RN if patient requiring more than 6 L to contact respiratory therapy for possibility of mid flow  "

## 2024-02-23 NOTE — RESPIRATORY THERAPY NOTE
RT Protocol Note  Lauren Quintero 78 y.o. female MRN: [id number]  Unit/Bed#: -01 Encounter: [id number]    Assessment    Principal Problem:    NSTEMI (non-ST elevated myocardial infarction) ([facility])  Active Problems:    Essential hypertension    Acute on chronic respiratory failure with hypoxia ([facility])    Major depressive disorder, recurrent episode, moderate ([facility])    COPD without exacerbation ([facility])    COVID-19    CKD (chronic kidney disease)      Home Pulmonary Medications:  Nebs/inhalers    Home Devices/Therapy: Home O2    Past Medical History:   Diagnosis Date    Acute congestive heart failure (HCC) 2021    Anxiety     Cardiac disease     Chest pain 2021    Chronic pain disorder     COPD (chronic obstructive pulmonary disease) ([facility])     Depression     Heart disease     Hyperlipidemia     Hypertension     MI (myocardial infarction) ([facility])     MRSA (methicillin resistant Staphylococcus aureus)     Renal disorder     benign kidney tumor     Social History     Socioeconomic History    Marital status:      Spouse name: None    Number of children: 3    Years of education: 13    Highest education level: High school graduate   Occupational History    Occupation:      Employer: [employer]     Comment: retired    Tobacco Use    Smoking status: Former     Current packs/day: 0.00     Average packs/day: 1 pack/day for 60.0 years (60.0 ttl pk-yrs)     Types: Cigarettes     Start date:      Quit date: 2016     Years since quittin.1    Smokeless tobacco: Never    Tobacco comments:     She has close to a 60 pack-year smoking history, most recently has cut down to 4-5 cigarettes daily   Vaping Use    Vaping status: Never Used   Substance and Sexual Activity    Alcohol use: Never    Drug use: No    Sexual activity: Not Currently   Other Topics Concern    None   Social History Narrative    None     Social Determinants of Health     Financial Resource Strain: Patient Declined  (1/6/2024)    Received from [facility]    Overall Financial Resource Strain (CARDIA)     Difficulty of Paying Living Expenses: Patient declined   Food Insecurity: No Food Insecurity (2/19/2024)    Hunger Vital Sign     Worried About Running Out of Food in the Last Year: Never true     Ran Out of Food in the Last Year: Never true   Transportation Needs: No Transportation Needs (2/19/2024)    PRAPARE - Transportation     Lack of Transportation (Medical): No     Lack of Transportation (Non-Medical): No   Physical Activity: Patient Declined (1/6/2024)    Received from [facility]    Exercise Vital Sign     Days of Exercise per Week: Patient declined     Minutes of Exercise per Session: Patient declined   Stress: Patient Declined (1/6/2024)    Received from [facility]    [employer] [facility] - Occupational Stress Questionnaire     Feeling of Stress : Patient declined   Social Connections: Patient Declined (1/6/2024)    Received from [facility]    Social Connection and Isolation Panel [NHANES]     Frequency of Communication with Friends and Family: Patient declined     Frequency of Social Gatherings with Friends and Family: Patient declined     Attends Restorationist Services: Patient declined     Active Member of Clubs or Organizations: Patient declined     Attends Club or Organization Meetings: Patient declined     Marital Status: Patient declined   Intimate Partner Violence: Patient Declined (1/6/2024)    Received from [facility]    Humiliation, Afraid, Rape, and Kick questionnaire     Fear of Current or Ex-Partner: Patient declined     Emotionally Abused: Patient declined     Physically Abused: Patient declined     Sexually Abused: Patient declined   Housing Stability: Low Risk  (2/19/2024)    Housing Stability Vital Sign     Unable to Pay for Housing in the Last Year: No     Number of Places Lived in the Last Year: 2     Unstable Housing in the Last Year: No  "      Subjective    Subjective Data: awake and alert    Objective    Physical Exam:   Assessment Type: During-treatment  General Appearance: Drowsy, Eyes open/responds to voice  Respiratory Pattern: Normal  Chest Assessment: Chest expansion symmetrical  Bilateral Breath Sounds: Diminished, Clear  R Breath Sounds: Coarse (upper)  L Breath Sounds: Coarse (upper)  Cough: None  O2 Device: NC    Vitals:  Blood pressure 127/58, pulse 88, temperature 98 °F (36.7 °C), temperature source Oral, resp. rate 20, height 5' 8\" (1.727 m), weight 59.9 kg (132 lb), SpO2 91%, not currently breastfeeding.          Imaging and other studies: I have personally reviewed pertinent reports.      O2 Device: NC     Plan    Respiratory Plan: Home Bronchodilator Patient pathway        Resp Comments: Pt resting comfortably and in no apparent distress.  States her breathing is OK.  Will cont w/ edith tx.   "

## 2024-02-23 NOTE — RESPIRATORY THERAPY NOTE
02/22/24 1919   Respiratory Protocol   Protocol Initiated? No   Protocol Selection Respiratory   Language Barrier? No   Medical & Social History Reviewed? Yes   Diagnostic Studies Reviewed? Yes   Physical Assessment Performed? Yes   Respiratory Plan Home Bronchodilator Patient pathway   Respiratory Assessment   General Appearance Awake;Alert   Respiratory Pattern Normal   Chest Assessment Chest expansion symmetrical   Bilateral Breath Sounds Diminished   R Breath Sounds Coarse  (upper)   L Breath Sounds Coarse  (upper)   Cough Non-productive;Congested;Moist   Resp Comments even non labored respirations   O2 Device nasal cannula   Additional Assessments   SpO2 90 %     Continue with scheduled aerosol therapy

## 2024-02-24 LAB
ANION GAP SERPL CALCULATED.3IONS-SCNC: 11 MMOL/L
BUN SERPL-MCNC: 43 MG/DL (ref 5–25)
CALCIUM SERPL-MCNC: 9.2 MG/DL (ref 8.4–10.2)
CHLORIDE SERPL-SCNC: 100 MMOL/L (ref 96–108)
CO2 SERPL-SCNC: 30 MMOL/L (ref 21–32)
CREAT SERPL-MCNC: 1.18 MG/DL (ref 0.6–1.3)
ERYTHROCYTE [DISTWIDTH] IN BLOOD BY AUTOMATED COUNT: 12.7 % (ref 11.6–15.1)
GFR SERPL CREATININE-BSD FRML MDRD: 44 ML/MIN/1.73SQ M
GLUCOSE SERPL-MCNC: 344 MG/DL (ref 65–140)
HCT VFR BLD AUTO: 38.2 % (ref 34.8–46.1)
HGB BLD-MCNC: 12 G/DL (ref 11.5–15.4)
MCH RBC QN AUTO: 30.5 PG (ref 26.8–34.3)
MCHC RBC AUTO-ENTMCNC: 31.4 G/DL (ref 31.4–37.4)
MCV RBC AUTO: 97 FL (ref 82–98)
PLATELET # BLD AUTO: 165 THOUSANDS/UL (ref 149–390)
PMV BLD AUTO: 11.5 FL (ref 8.9–12.7)
POTASSIUM SERPL-SCNC: 3.6 MMOL/L (ref 3.5–5.3)
RBC # BLD AUTO: 3.93 MILLION/UL (ref 3.81–5.12)
SODIUM SERPL-SCNC: 141 MMOL/L (ref 135–147)
WBC # BLD AUTO: 14.74 THOUSAND/UL (ref 4.31–10.16)

## 2024-02-24 PROCEDURE — 85027 COMPLETE CBC AUTOMATED: CPT | Performed by: STUDENT IN AN ORGANIZED HEALTH CARE EDUCATION/TRAINING PROGRAM

## 2024-02-24 PROCEDURE — 94760 N-INVAS EAR/PLS OXIMETRY 1: CPT

## 2024-02-24 PROCEDURE — 99232 SBSQ HOSP IP/OBS MODERATE 35: CPT | Performed by: STUDENT IN AN ORGANIZED HEALTH CARE EDUCATION/TRAINING PROGRAM

## 2024-02-24 PROCEDURE — 99232 SBSQ HOSP IP/OBS MODERATE 35: CPT | Performed by: INTERNAL MEDICINE

## 2024-02-24 PROCEDURE — 94640 AIRWAY INHALATION TREATMENT: CPT

## 2024-02-24 PROCEDURE — 80048 BASIC METABOLIC PNL TOTAL CA: CPT | Performed by: STUDENT IN AN ORGANIZED HEALTH CARE EDUCATION/TRAINING PROGRAM

## 2024-02-24 PROCEDURE — 94664 DEMO&/EVAL PT USE INHALER: CPT

## 2024-02-24 RX ORDER — GUAIFENESIN 600 MG/1
1200 TABLET, EXTENDED RELEASE ORAL EVERY 12 HOURS SCHEDULED
Status: DISCONTINUED | OUTPATIENT
Start: 2024-02-24 | End: 2024-03-03

## 2024-02-24 RX ORDER — CEFTRIAXONE 1 G/50ML
1000 INJECTION, SOLUTION INTRAVENOUS EVERY 24 HOURS
Status: DISCONTINUED | OUTPATIENT
Start: 2024-02-24 | End: 2024-02-28

## 2024-02-24 RX ORDER — FUROSEMIDE 20 MG/1
20 TABLET ORAL
Status: DISCONTINUED | OUTPATIENT
Start: 2024-02-24 | End: 2024-02-25

## 2024-02-24 RX ORDER — MAGNESIUM SULFATE HEPTAHYDRATE 40 MG/ML
2 INJECTION, SOLUTION INTRAVENOUS ONCE
Status: COMPLETED | OUTPATIENT
Start: 2024-02-24 | End: 2024-02-24

## 2024-02-24 RX ADMIN — ACETAMINOPHEN 975 MG: 325 TABLET, FILM COATED ORAL at 10:39

## 2024-02-24 RX ADMIN — BUPROPION HYDROCHLORIDE 75 MG: 75 TABLET, FILM COATED ORAL at 08:36

## 2024-02-24 RX ADMIN — METHYLPREDNISOLONE SODIUM SUCCINATE 40 MG: 40 INJECTION, POWDER, FOR SOLUTION INTRAMUSCULAR; INTRAVENOUS at 08:37

## 2024-02-24 RX ADMIN — CEFTRIAXONE 1000 MG: 1 INJECTION, SOLUTION INTRAVENOUS at 08:52

## 2024-02-24 RX ADMIN — FUROSEMIDE 20 MG: 20 TABLET ORAL at 08:36

## 2024-02-24 RX ADMIN — LEVALBUTEROL HYDROCHLORIDE 1.25 MG: 1.25 SOLUTION RESPIRATORY (INHALATION) at 07:10

## 2024-02-24 RX ADMIN — BUDESONIDE AND FORMOTEROL FUMARATE DIHYDRATE 2 PUFF: 160; 4.5 AEROSOL RESPIRATORY (INHALATION) at 17:18

## 2024-02-24 RX ADMIN — BUDESONIDE AND FORMOTEROL FUMARATE DIHYDRATE 2 PUFF: 160; 4.5 AEROSOL RESPIRATORY (INHALATION) at 08:41

## 2024-02-24 RX ADMIN — BARICITINIB 2 MG: 2 TABLET, FILM COATED ORAL at 08:39

## 2024-02-24 RX ADMIN — ALBUTEROL SULFATE 2.5 MG: 2.5 SOLUTION RESPIRATORY (INHALATION) at 06:01

## 2024-02-24 RX ADMIN — GUAIFENESIN 1200 MG: 600 TABLET ORAL at 12:12

## 2024-02-24 RX ADMIN — PANTOPRAZOLE SODIUM 40 MG: 40 TABLET, DELAYED RELEASE ORAL at 08:37

## 2024-02-24 RX ADMIN — PRAVASTATIN SODIUM 80 MG: 80 TABLET ORAL at 17:15

## 2024-02-24 RX ADMIN — MIDODRINE HYDROCHLORIDE 2.5 MG: 5 TABLET ORAL at 08:51

## 2024-02-24 RX ADMIN — ZANUBRUTINIB 160 MG: 80 CAPSULE, GELATIN COATED ORAL at 17:17

## 2024-02-24 RX ADMIN — MAGNESIUM SULFATE HEPTAHYDRATE 2 G: 40 INJECTION, SOLUTION INTRAVENOUS at 08:52

## 2024-02-24 RX ADMIN — FUROSEMIDE 20 MG: 20 TABLET ORAL at 17:30

## 2024-02-24 RX ADMIN — Medication 3 MG: at 23:45

## 2024-02-24 RX ADMIN — MIDODRINE HYDROCHLORIDE 2.5 MG: 5 TABLET ORAL at 10:38

## 2024-02-24 RX ADMIN — ASPIRIN 81 MG: 81 TABLET, COATED ORAL at 08:36

## 2024-02-24 RX ADMIN — DOCUSATE SODIUM 100 MG: 100 CAPSULE, LIQUID FILLED ORAL at 08:37

## 2024-02-24 RX ADMIN — VILAZODONE HYDROCHLORIDE 40 MG: 20 TABLET ORAL at 08:39

## 2024-02-24 RX ADMIN — LORAZEPAM 0.5 MG: 0.5 TABLET ORAL at 08:52

## 2024-02-24 RX ADMIN — CARVEDILOL 25 MG: 12.5 TABLET, FILM COATED ORAL at 08:51

## 2024-02-24 RX ADMIN — MIDODRINE HYDROCHLORIDE 2.5 MG: 5 TABLET ORAL at 17:15

## 2024-02-24 RX ADMIN — METHYLPREDNISOLONE SODIUM SUCCINATE 40 MG: 40 INJECTION, POWDER, FOR SOLUTION INTRAMUSCULAR; INTRAVENOUS at 21:43

## 2024-02-24 RX ADMIN — ZANUBRUTINIB 160 MG: 80 CAPSULE, GELATIN COATED ORAL at 08:40

## 2024-02-24 RX ADMIN — DOCUSATE SODIUM 100 MG: 100 CAPSULE, LIQUID FILLED ORAL at 17:15

## 2024-02-24 RX ADMIN — GUAIFENESIN 1200 MG: 600 TABLET ORAL at 21:19

## 2024-02-24 RX ADMIN — GUAIFENESIN AND DEXTROMETHORPHAN 10 ML: 100; 10 SYRUP ORAL at 08:52

## 2024-02-24 RX ADMIN — LEVALBUTEROL HYDROCHLORIDE 1.25 MG: 1.25 SOLUTION RESPIRATORY (INHALATION) at 19:14

## 2024-02-24 RX ADMIN — CARVEDILOL 25 MG: 12.5 TABLET, FILM COATED ORAL at 17:14

## 2024-02-24 RX ADMIN — IPRATROPIUM BROMIDE 0.5 MG: 0.5 SOLUTION RESPIRATORY (INHALATION) at 07:10

## 2024-02-24 RX ADMIN — IPRATROPIUM BROMIDE 0.5 MG: 0.5 SOLUTION RESPIRATORY (INHALATION) at 19:14

## 2024-02-24 NOTE — RESPIRATORY THERAPY NOTE
02/23/24 2016   Respiratory Protocol   Protocol Initiated? No   Protocol Selection Respiratory   Language Barrier? No   Medical & Social History Reviewed? Yes   Diagnostic Studies Reviewed? Yes   Physical Assessment Performed? Yes   Home Devices/Therapy Home O2   Respiratory Plan Home Bronchodilator Patient pathway   Respiratory Assessment   General Appearance Sleeping   Respiratory Pattern Tachypneic   Chest Assessment Chest expansion symmetrical   Bilateral Breath Sounds Clear;Diminished   Cough None   Resp Comments respirations are even non labored, resting quietly   O2 Device 6L o2 nc   Additional Assessments   SpO2 92 %     Pt resting quietly with even non labored respirations, will wake and open eyes to voice, bilat clear slightly dimin breath sounds, no distress noted at this time, will continue with scheduled aerosol therapy

## 2024-02-24 NOTE — ASSESSMENT & PLAN NOTE
Lab Results   Component Value Date    HSTNI0 2,584 (H) 02/17/2024    HSTNI2 2,049 (H) 02/17/2024    HSTNID2 -535 02/17/2024      CT chest showing no evidence of pulmonary embolism, did note mild pulmonary edema  No chest pain present, but significant SOB, anginal equivalent, sudden onset, fairly elevated troponins  EKG: Sinus Tachycardia with no obvious STEMI  Suspect a true type I NSTEMI  ADRIEN Score: 5  The patient's presentation with shortness of breath was somewhat sudden according to the son, he was driving her home from the rehab facility if she got acutely short of breath and hypoxemic again.  Certainly it is possible that the COVID infection may have triggered some degree of underlying CAD leading to an NSTEMI.  Can stop heparin drip  Cardiology following.  Cardiac cath showed significant triple vessel disease with 90% stenosis in the mid LAD, 80% stenosis in the proximal LAD, 85% stenosis in the mid Cx, and 70% in the RCA with a patent mid RCA stent. Cardiology recommends maximizing medical therapy for her CAD and follow-up outpatient with a CT surgeon for a potential CABG once she fully recovers from COVID.  Given new reduced EF of 35%, will discuss LifeVest prior to discharge. Ongoing [facility] discussion for lifevest vs palliative care

## 2024-02-24 NOTE — PLAN OF CARE
Problem: Potential for Falls  Goal: Patient will remain free of falls  Description: INTERVENTIONS:  - Educate patient/family on patient safety including physical limitations  - Instruct patient to call for assistance with activity   - Consult OT/PT to assist with strengthening/mobility   - Keep Call bell within reach  - Keep bed low and locked with side rails adjusted as appropriate  - Keep care items and personal belongings within reach  - Initiate and maintain comfort rounds  - Make Fall Risk Sign visible to staff  - Offer Toileting every 2 Hours, in advance of need  - Initiate/Maintain bed and chair alarm  - Obtain necessary fall risk management equipment: non skid socks  - Apply yellow socks and bracelet for high fall risk patients  - Consider moving patient to room near nurses station  Outcome: Progressing     Problem: RESPIRATORY - ADULT  Goal: Achieves optimal ventilation and oxygenation  Description: INTERVENTIONS:  - Assess for changes in respiratory status  - Assess for changes in mentation and behavior  - Position to facilitate oxygenation and minimize respiratory effort  - Oxygen administered by appropriate delivery if ordered  - Initiate smoking cessation education as indicated  - Encourage broncho-pulmonary hygiene including cough, deep breathe, Incentive Spirometry  - Assess the need for suctioning and aspirate as needed  - Assess and instruct to report SOB or any respiratory difficulty  - Respiratory Therapy support as indicated  Outcome: Progressing     Problem: Prexisting or High Potential for Compromised Skin Integrity  Goal: Skin integrity is maintained or improved  Description: INTERVENTIONS:  - Identify patients at risk for skin breakdown  - Assess and monitor skin integrity  - Assess and monitor nutrition and hydration status  - Monitor labs   - Assess for incontinence   - Turn and reposition patient  - Assist with mobility/ambulation  - Relieve pressure over bony prominences  - Avoid  friction and shearing  - Provide appropriate hygiene as needed including keeping skin clean and dry  - Evaluate need for skin moisturizer/barrier cream  - Collaborate with interdisciplinary team   - Patient/family teaching  - Consider wound care consult   Outcome: Progressing     Problem: PAIN - ADULT  Goal: Verbalizes/displays adequate comfort level or baseline comfort level  Description: Interventions:  - Encourage patient to monitor pain and request assistance  - Assess pain using appropriate pain scale  - Administer analgesics based on type and severity of pain and evaluate response  - Implement non-pharmacological measures as appropriate and evaluate response  - Consider cultural and social influences on pain and pain management  - Notify physician/advanced practitioner if interventions unsuccessful or patient reports new pain  Outcome: Progressing     Problem: INFECTION - ADULT  Goal: Absence or prevention of progression during hospitalization  Description: INTERVENTIONS:  - Assess and monitor for signs and symptoms of infection  - Monitor lab/diagnostic results  - Monitor all insertion sites, i.e. indwelling lines, tubes, and drains  - Monitor endotracheal if appropriate and nasal secretions for changes in amount and color  - [place] appropriate cooling/warming therapies per order  - Administer medications as ordered  - Instruct and encourage patient and family to use good hand hygiene technique  - Identify and instruct in appropriate isolation precautions for identified infection/condition  Outcome: Progressing  Goal: Absence of fever/infection during neutropenic period  Description: INTERVENTIONS:  - Monitor WBC    Outcome: Progressing     Problem: SAFETY ADULT  Goal: Patient will remain free of falls  Description: INTERVENTIONS:  - Educate patient/family on patient safety including physical limitations  - Instruct patient to call for assistance with activity   - Consult OT/PT to assist with  strengthening/mobility   - Keep Call bell within reach  - Keep bed low and locked with side rails adjusted as appropriate  - Keep care items and personal belongings within reach  - Initiate and maintain comfort rounds  - Make Fall Risk Sign visible to staff  - Offer Toileting every 2 Hours, in advance of need  - Initiate/Maintain bed and chair alarm  - Obtain necessary fall risk management equipment: non skid socks  - Apply yellow socks and bracelet for high fall risk patients  - Consider moving patient to room near nurses station  Outcome: Progressing  Goal: Maintain or return to baseline ADL function  Description: INTERVENTIONS:  -  Assess patient's ability to carry out ADLs; assess patient's baseline for ADL function and identify physical deficits which impact ability to perform ADLs (bathing, care of mouth/teeth, toileting, grooming, dressing, etc.)  - Assess/evaluate cause of self-care deficits   - Assess range of motion  - Assess patient's mobility; develop plan if impaired  - Assess patient's need for assistive devices and provide as appropriate  - Encourage maximum independence but intervene and supervise when necessary  - Involve family in performance of ADLs  - Assess for home care needs following discharge   - Consider OT consult to assist with ADL evaluation and planning for discharge  - Provide patient education as appropriate  Outcome: Progressing  Goal: Maintains/Returns to pre admission functional level  Description: INTERVENTIONS:  - Perform AM-PAC 6 Click Basic Mobility/ Daily Activity assessment daily.  - Set and communicate daily mobility goal to care team and patient/family/caregiver.   - Collaborate with rehabilitation services on mobility goals if consulted  - Perform Range of Motion 4 times a day.  - Reposition patient every 2 hours.  - Dangle patient 4 times a day  - Stand patient 3 times a day  - Ambulate patient 3 times a day  - Out of bed to chair 3 times a day   - Out of bed for meals 3  times a day  - Out of bed for toileting  - Record patient progress and toleration of activity level   Outcome: Progressing     Problem: DISCHARGE PLANNING  Goal: Discharge to home or other facility with appropriate resources  Description: INTERVENTIONS:  - Identify barriers to discharge w/patient and caregiver  - Arrange for needed discharge resources and transportation as appropriate  - Identify discharge learning needs (meds, wound care, etc.)  - Arrange for interpretive services to assist at discharge as needed  - Refer to Case Management Department for coordinating discharge planning if the patient needs post-hospital services based on physician/advanced practitioner order or complex needs related to functional status, cognitive ability, or social support system  Outcome: Progressing     Problem: Knowledge Deficit  Goal: Patient/family/caregiver demonstrates understanding of disease process, treatment plan, medications, and discharge instructions  Description: Complete learning assessment and assess knowledge base.  Interventions:  - Provide teaching at level of understanding  - Provide teaching via preferred learning methods  Outcome: Progressing     Problem: MOBILITY - ADULT  Goal: Maintain or return to baseline ADL function  Description: INTERVENTIONS:  -  Assess patient's ability to carry out ADLs; assess patient's baseline for ADL function and identify physical deficits which impact ability to perform ADLs (bathing, care of mouth/teeth, toileting, grooming, dressing, etc.)  - Assess/evaluate cause of self-care deficits   - Assess range of motion  - Assess patient's mobility; develop plan if impaired  - Assess patient's need for assistive devices and provide as appropriate  - Encourage maximum independence but intervene and supervise when necessary  - Involve family in performance of ADLs  - Assess for home care needs following discharge   - Consider OT consult to assist with ADL evaluation and planning for  discharge  - Provide patient education as appropriate  Outcome: Progressing  Goal: Maintains/Returns to pre admission functional level  Description: INTERVENTIONS:  - Perform AM-PAC 6 Click Basic Mobility/ Daily Activity assessment daily.  - Set and communicate daily mobility goal to care team and patient/family/caregiver.   - Collaborate with rehabilitation services on mobility goals if consulted  - Perform Range of Motion 4 times a day.  - Reposition patient every 3 hours.  - Dangle patient 4 times a day  - Stand patient 3 times a day  - Ambulate patient 3 times a day  - Out of bed to chair 3 times a day   - Out of bed for meals 3 times a day  - Out of bed for toileting  - Record patient progress and toleration of activity level   Outcome: Progressing

## 2024-02-24 NOTE — PROGRESS NOTES
[facility]  Progress Note  Name: Lauren Quintero I  MRN: [id number]  Unit/Bed#: -01 I Date of Admission: 2/15/2024   Date of Service: 2/24/2024 I Hospital Day: 9    Assessment/Plan   CKD (chronic kidney disease)  Assessment & Plan  Lab Results   Component Value Date    EGFR 44 02/24/2024    EGFR 44 02/23/2024    EGFR 59 02/22/2024    CREATININE 1.18 02/24/2024    CREATININE 1.18 02/23/2024    CREATININE 0.93 02/22/2024     Pt has a history of CKD stage 2  Transitioned to po lasix 20 mg daily, will given additional dose of lasix 20 mg bid today given concern for vascular congestion  Cont to monitor bmp    COVID-19  Assessment & Plan  Lab Results   Component Value Date    SARSCOV2 Positive (A) 02/15/2024    SARSCOV2 Negative 12/12/2023    SARSCOV2 Negative 10/28/2023       Symptoms of COVID-19 SYMPTOMS: shortness of breath and Tachycardia (more than 100 beats per min)  Vaccine status: vaccinated  The patient does have acute hypoxemic respiratory failure given the same    CT chest w contrast showing Scattered areas of tree-in-bud opacity consistent with endobronchial impaction/infection  S/p remdesevir, IV steroids  Continue baricitinib given worsening oxygen requirement on 2/16  Oxygen requirements back to baseline, cough improving, afebrile  Had episode of hypoxia overnight, but resolved after  given neb treatments.  Continue to monitor  Patient likely will need rehab stay after discharge    COPD with exacerbation (HCC)  Assessment & Plan  Worsening Sob, wheezing, increased o2 requirements as above.   Will continue with IV solumedrol 40 mg bid, scheduled nebulizer treatment  Transition to po prednisone taper tomorrow if patient continues to improve    Major depressive disorder, recurrent episode, moderate (HCC)  Assessment & Plan  Continue wellbutrin, vilazodone  Also takes Vraylar; recommend bringing from home as we do not have it on formulary - patient now has it     Acute on chronic  respiratory failure with hypoxia (HCC)  Assessment & Plan  Currently SpO2: 93 % on Nasal Cannula O2 Flow Rate (L/min): 5 L/min    At baseline, uses 4L NC  CT Imaging showing mild pulm edema, scattered areas of tree-in-bud opacity  Continue to treat underlying NSTEMI, COVID, mild pulm edema  2/23 repeat chest xray Unchanged appearance of mild interstitial edema.Right basilar opacity likely representing small effusion with atelectasis. Please note pneumonia may demonstrate a similar appearance the appropriate clinical setting.  Pt with worsening cough, sob. Restart on IV solumedrol 40 mg bid, IV ceftriaxone. If procal negative x 2 continue d/c abx   Wean down to get to baseline 4L NC      Essential hypertension  Assessment & Plan  Continue home meds; initiate lisinopril as part of NSTEMI management  Continue carvedilol  PRN IV Labetalol for SBP > 160  Lasix 20 mg bid for today, plan to resume lasix 20 mg daily tomorrow   Continue to monitor blood pressure          * NSTEMI (non-ST elevated myocardial infarction) (HCC)  Assessment & Plan  Lab Results   Component Value Date    HSTNI0 2,584 (H) 02/17/2024    HSTNI2 2,049 (H) 02/17/2024    HSTNID2 -535 02/17/2024      CT chest showing no evidence of pulmonary embolism, did note mild pulmonary edema  No chest pain present, but significant SOB, anginal equivalent, sudden onset, fairly elevated troponins  EKG: Sinus Tachycardia with no obvious STEMI  Suspect a true type I NSTEMI  ADRIEN Score: 5  The patient's presentation with shortness of breath was somewhat sudden according to the son, he was driving her home from the rehab facility if she got acutely short of breath and hypoxemic again.  Certainly it is possible that the COVID infection may have triggered some degree of underlying CAD leading to an NSTEMI.  Can stop heparin drip  Cardiology following.  Cardiac cath showed significant triple vessel disease with 90% stenosis in the mid LAD, 80% stenosis in the proximal LAD,  85% stenosis in the mid Cx, and 70% in the RCA with a patent mid RCA stent. Cardiology recommends maximizing medical therapy for her CAD and follow-up outpatient with a CT surgeon for a potential CABG once she fully recovers from COVID.  Given new reduced EF of 35%, will discuss LifeVest prior to discharge. Ongoing [facility] discussion for lifevest vs palliative care                   VTE Pharmacologic Prophylaxis: VTE Score: 7 High Risk (Score >/= 5) - Pharmacological DVT Prophylaxis Ordered: heparin. Sequential Compression Devices Ordered.    Mobility:   Basic Mobility Inpatient Raw Score: 18  -HLM Goal: 6: Walk 10 steps or more  JH-HLM Achieved: 2: Bed activities/Dependent transfer  HLM Goal NOT achieved. Continue with multidisciplinary rounding and encourage appropriate mobility to improve upon HLM goals.    Patient Centered Rounds: I performed bedside rounds with nursing staff today.   Discussions with Specialists or Other Care Team Provider: CM    Education and Discussions with Family / Patient: Attempted to update  (son) via phone. Left voicemail.     Total Time Spent on Date of Encounter in care of patient: 50 mins. This time was spent on one or more of the following: performing physical exam; counseling and coordination of care; obtaining or reviewing history; documenting in the medical record; reviewing/ordering tests, medications or procedures; communicating with other healthcare professionals and discussing with patient's family/caregivers.    Current Length of Stay: 9 day(s)  Current Patient Status: Inpatient   Certification Statement: The patient will continue to require additional inpatient hospital stay due to lifevest  Discharge Plan: Anticipate discharge in 24-48 hrs to rehab facility.    Code Status: Level 3 - DNAR and DNI    Subjective:   Pt reports worsening cough today.     Objective:     Vitals:   Temp (24hrs), Av.8 °F (36.6 °C), Min:97.4 °F (36.3 °C), Max:98.2 °F (36.8  °C)    Temp:  [97.4 °F (36.3 °C)-98.2 °F (36.8 °C)] 97.4 °F (36.3 °C)  HR:  [79-98] 98  Resp:  [18-38] 20  BP: ()/(54-73) 131/60  SpO2:  [88 %-93 %] 93 %  Body mass index is 20.07 kg/m².     Input and Output Summary (last 24 hours):   No intake or output data in the 24 hours ending 02/24/24 1117    Physical Exam:   Physical Exam  Constitutional:       General: She is not in acute distress.     Appearance: She is well-developed. She is not diaphoretic.   HENT:      Head: Normocephalic and atraumatic.      Nose: Nose normal.      Mouth/Throat:      Pharynx: No oropharyngeal exudate.   Eyes:      General: No scleral icterus.        Right eye: No discharge.         Left eye: No discharge.      Conjunctiva/sclera: Conjunctivae normal.   Cardiovascular:      Rate and Rhythm: Normal rate and regular rhythm.      Heart sounds: Normal heart sounds. No murmur heard.     No friction rub. No gallop.   Pulmonary:      Effort: Pulmonary effort is normal. No respiratory distress.      Breath sounds: Wheezing and rhonchi present. No rales.      Comments: 5L NC  Abdominal:      General: Bowel sounds are normal. There is no distension.      Palpations: Abdomen is soft.      Tenderness: There is no abdominal tenderness. There is no guarding.   Musculoskeletal:         General: Normal range of motion.      Cervical back: Normal range of motion and neck supple.   Skin:     General: Skin is warm and dry.      Findings: No erythema.   Neurological:      Mental Status: She is oriented to person, place, and time.   Psychiatric:         Behavior: Behavior normal.          Additional Data:     Labs:  Results from last 7 days   Lab Units 02/24/24  0541 02/21/24  0556 02/20/24  0622   WBC Thousand/uL 14.74*   < > 4.08*   HEMOGLOBIN g/dL 12.0   < > 11.5   HEMATOCRIT % 38.2   < > 35.5   PLATELETS Thousands/uL 165   < > 188   NEUTROS PCT %  --   --  79*   LYMPHS PCT %  --   --  15   MONOS PCT %  --   --  5   EOS PCT %  --   --  0    < > =  values in this interval not displayed.     Results from last 7 days   Lab Units 02/24/24  0541   SODIUM mmol/L 141   POTASSIUM mmol/L 3.6   CHLORIDE mmol/L 100   CO2 mmol/L 30   BUN mg/dL 43*   CREATININE mg/dL 1.18   ANION GAP mmol/L 11   CALCIUM mg/dL 9.2   GLUCOSE RANDOM mg/dL 344*         Results from last 7 days   Lab Units 02/20/24  1628   POC GLUCOSE mg/dl 91               Lines/Drains:  Invasive Devices       Peripheral Intravenous Line  Duration             Peripheral IV 02/23/24 Right;Ventral (anterior) Forearm <1 day                      Telemetry:  Telemetry Orders (From admission, onward)               24 Hour Telemetry Monitoring  Continuous x 24 Hours (Telem)        Expiring   Question:  Reason for 24 Hour Telemetry  Answer:  PCI/EP study (including pacer and ICD implementation), Cardiac surgery, MI, abnormal cardiac cath, and chest pain- rule out MI                     Telemetry Reviewed: Normal Sinus Rhythm  Indication for Continued Telemetry Use: Lifevest (remains on tele entire hospital stay)             Imaging: No pertinent imaging reviewed.    Recent Cultures (last 7 days):   Results from last 7 days   Lab Units 02/20/24  1616   BLOOD CULTURE  No Growth at 72 hrs.  No Growth at 72 hrs.       Last 24 Hours Medication List:   Current Facility-Administered Medications   Medication Dose Route Frequency Provider Last Rate    acetaminophen  975 mg Oral Q8H PRN Vasu Ramos PA-C      albuterol  2 puff Inhalation Q4H PRN Vasu Ramos PA-C      albuterol  2.5 mg Nebulization Q4H PRN Vasu Ramos PA-C      aluminum-magnesium hydroxide-simethicone  30 mL Oral Q6H PRN Vasu Ramos PA-C      aspirin  81 mg Oral Daily Vasu Ramos PA-C      baricitinib  2 mg Oral Q24H Vasu Ramos PA-C      budesonide-formoterol  2 puff Inhalation BID Vasu Ramos PA-C      buPROPion  75 mg Oral Daily Vasu Ramos PA-C      carvedilol  25 mg  Oral BID With Meals Vasu Ramos PA-C      cefTRIAXone  1,000 mg Intravenous Q24H [facility] 1,000 mg (02/24/24 0852)    dextromethorphan-guaiFENesin  10 mL Oral Q6H PRN Vasu Ramos PA-C      docusate sodium  100 mg Oral BID Vasu Ramos PA-C      furosemide  20 mg Oral BID (diuretic) [facility]      ipratropium  0.5 mg Nebulization BID Vasu Ramos PA-C      labetalol  10 mg Intravenous Q6H PRN Vasu Ramos PA-C      levalbuterol  1.25 mg Nebulization BID Vasu Ramos PA-C      lidocaine  2 patch Topical Daily PRN Vasu Ramos PA-C      LORazepam  0.5 mg Oral BID PRN [facility]      melatonin  3 mg Oral HS PRN Vasu Ramos PA-C      methylPREDNISolone sodium succinate  40 mg Intravenous Q12H ROSELINE [facility]      midodrine  2.5 mg Oral TID AC Vasu Ramos PA-C      nitroglycerin  0.4 mg Sublingual Q5 Min PRN Vasu Ramos PA-C      ondansetron  4 mg Intravenous Q6H PRN Vasu Ramos PA-C      pantoprazole  40 mg Oral Daily Vasu Ramos PA-C      pravastatin  80 mg Oral QPM Vasu Ramos PA-C      vilazodone  40 mg Oral Daily With Breakfast Vasu Ramos PA-C      Zanubrutinib  160 mg Oral BID Vasu Ramos PA-C          Today, Patient Was Seen By: Latia Cortes DO    **Please Note: This note may have been constructed using a voice recognition system.**

## 2024-02-24 NOTE — ASSESSMENT & PLAN NOTE
Currently SpO2: 93 % on Nasal Cannula O2 Flow Rate (L/min): 5 L/min    At baseline, uses 4L NC  CT Imaging showing mild pulm edema, scattered areas of tree-in-bud opacity  Continue to treat underlying NSTEMI, COVID, mild pulm edema  2/23 repeat chest xray Unchanged appearance of mild interstitial edema.Right basilar opacity likely representing small effusion with atelectasis. Please note pneumonia may demonstrate a similar appearance the appropriate clinical setting.  Pt with worsening cough, sob. Restart on IV solumedrol 40 mg bid, IV ceftriaxone. If procal negative x 2 continue d/c abx   Wean down to get to baseline 4L NC

## 2024-02-24 NOTE — ASSESSMENT & PLAN NOTE
Continue home meds; initiate lisinopril as part of NSTEMI management  Continue carvedilol  PRN IV Labetalol for SBP > 160  Lasix 20 mg bid for today, plan to resume lasix 20 mg daily tomorrow   Continue to monitor blood pressure

## 2024-02-24 NOTE — RESPIRATORY THERAPY NOTE
RT Protocol Note  Lauren Quintero 78 y.o. female MRN: [id number]  Unit/Bed#: -01 Encounter: [id number]    Assessment    Principal Problem:    NSTEMI (non-ST elevated myocardial infarction) ([facility])  Active Problems:    Essential hypertension    Acute on chronic respiratory failure with hypoxia ([facility])    Major depressive disorder, recurrent episode, moderate ([facility])    COPD without exacerbation ([facility])    COVID-19    CKD (chronic kidney disease)      Home Pulmonary Medications:  Nebs/inhalers    Home Devices/Therapy: Home O2    Past Medical History:   Diagnosis Date    Acute congestive heart failure (HCC) 2021    Anxiety     Cardiac disease     Chest pain 2021    Chronic pain disorder     COPD (chronic obstructive pulmonary disease) ([facility])     Depression     Heart disease     Hyperlipidemia     Hypertension     MI (myocardial infarction) ([facility])     MRSA (methicillin resistant Staphylococcus aureus)     Renal disorder     benign kidney tumor     Social History     Socioeconomic History    Marital status:      Spouse name: None    Number of children: 3    Years of education: 13    Highest education level: High school graduate   Occupational History    Occupation:      Employer: [employer]     Comment: retired    Tobacco Use    Smoking status: Former     Current packs/day: 0.00     Average packs/day: 1 pack/day for 60.0 years (60.0 ttl pk-yrs)     Types: Cigarettes     Start date:      Quit date: 2016     Years since quittin.1    Smokeless tobacco: Never    Tobacco comments:     She has close to a 60 pack-year smoking history, most recently has cut down to 4-5 cigarettes daily   Vaping Use    Vaping status: Never Used   Substance and Sexual Activity    Alcohol use: Never    Drug use: No    Sexual activity: Not Currently   Other Topics Concern    None   Social History Narrative    None     Social Determinants of Health     Financial Resource Strain: Patient Declined  (1/6/2024)    Received from [facility] [employer]    Overall Financial Resource Strain (CARDIA)     Difficulty of Paying Living Expenses: Patient declined   Food Insecurity: No Food Insecurity (2/19/2024)    Hunger Vital Sign     Worried About Running Out of Food in the Last Year: Never true     Ran Out of Food in the Last Year: Never true   Transportation Needs: No Transportation Needs (2/19/2024)    PRAPARE - Transportation     Lack of Transportation (Medical): No     Lack of Transportation (Non-Medical): No   Physical Activity: Patient Declined (1/6/2024)    Received from [facility] [employer]    Exercise Vital Sign     Days of Exercise per Week: Patient declined     Minutes of Exercise per Session: Patient declined   Stress: Patient Declined (1/6/2024)    Received from [facility] [employer]    [employer] - Occupational Stress Questionnaire     Feeling of Stress : Patient declined   Social Connections: Patient Declined (1/6/2024)    Received from [facility] [employer]    Social Connection and Isolation Panel [NHANES]     Frequency of Communication with Friends and Family: Patient declined     Frequency of Social Gatherings with Friends and Family: Patient declined     Attends Shinto Services: Patient declined     Active Member of Clubs or Organizations: Patient declined     Attends Club or Organization Meetings: Patient declined     Marital Status: Patient declined   Intimate Partner Violence: Patient Declined (1/6/2024)    Received from [facility] [employer]    Humiliation, Afraid, Rape, and Kick questionnaire     Fear of Current or Ex-Partner: Patient declined     Emotionally Abused: Patient declined     Physically Abused: Patient declined     Sexually Abused: Patient declined   Housing Stability: Low Risk  (2/19/2024)    Housing Stability Vital Sign     Unable to Pay for Housing in the Last Year: No     Number of Places Lived in the Last Year: 2     Unstable Housing in the Last Year: No  "      Subjective    Subjective Data: awake and alert    Objective    Physical Exam:   Assessment Type: During-treatment  General Appearance: Awake, Alert  Respiratory Pattern: Normal  Chest Assessment: Chest expansion symmetrical  Bilateral Breath Sounds: Diminished, Clear  Cough: None  O2 Device: NC    Vitals:  Blood pressure 131/60, pulse 98, temperature (!) 97.4 °F (36.3 °C), resp. rate 20, height 5' 8\" (1.727 m), weight 59.9 kg (132 lb), SpO2 93%, not currently breastfeeding.          Imaging and other studies: I have personally reviewed pertinent reports.      O2 Device: NC     Plan    Respiratory Plan: Home Bronchodilator Patient pathway        Resp Comments: Pt resting comfortably and in no apparent distress.  Offers no complaints.  States she slept well last night.  Will cont w/ current therapy.   "

## 2024-02-24 NOTE — ASSESSMENT & PLAN NOTE
Lab Results   Component Value Date    SARSCOV2 Positive (A) 02/15/2024    SARSCOV2 Negative 12/12/2023    SARSCOV2 Negative 10/28/2023       Symptoms of COVID-19 SYMPTOMS: shortness of breath and Tachycardia (more than 100 beats per min)  Vaccine status: vaccinated  The patient does have acute hypoxemic respiratory failure given the same    CT chest w contrast showing Scattered areas of tree-in-bud opacity consistent with endobronchial impaction/infection  S/p remdesevir, IV steroids  Continue baricitinib given worsening oxygen requirement on 2/16  Oxygen requirements back to baseline, cough improving, afebrile  Had episode of hypoxia overnight, but resolved after  given neb treatments.  Continue to monitor  Patient likely will need rehab stay after discharge

## 2024-02-24 NOTE — ASSESSMENT & PLAN NOTE
Worsening Sob, wheezing, increased o2 requirements as above.   Will continue with IV solumedrol 40 mg bid, scheduled nebulizer treatment  Transition to po prednisone taper tomorrow if patient continues to improve

## 2024-02-24 NOTE — ASSESSMENT & PLAN NOTE
Lab Results   Component Value Date    EGFR 44 02/24/2024    EGFR 44 02/23/2024    EGFR 59 02/22/2024    CREATININE 1.18 02/24/2024    CREATININE 1.18 02/23/2024    CREATININE 0.93 02/22/2024     Pt has a history of CKD stage 2  Transitioned to po lasix 20 mg daily, will given additional dose of lasix 20 mg bid today given concern for vascular congestion  Cont to monitor bmp

## 2024-02-24 NOTE — PROGRESS NOTES
General Cardiology   Progress Note   Lauren Quintero 78 y.o. female MRN: [id number]  Unit/Bed#: -01 Encounter: [id number]    Assessment/Plan:    Elevated troponin  -S/p coronary angiography which revealed triple-vessel CAD with a patent mRCA stent, proximal to mLAD 80% stenosis, mLAD 97% stenosis, mCx 85% stenosis in first marginal 75% stenosis and mid RCA 70% stenosis  -Continue aspirin, carvedilol, atorvastatin  -Continue to monitor on telemetry  -Plan for CT surgery evaluation or high risk PCI once patient is overall medically recovered from COVID-19, pneumonia and sepsis    2.  Two-vessel CAD  - See plan as above    3.  New ischemic cardiomyopathy with an EF of 35%  - Continue carvedilol  -Discussed LifeVest with patient, she is willing to wear it  - Order submitted to [facility] and case management notified  - Further GDMT to be added on an outpatient basis, patient currently hypotensive at times    4.  New onset HFrEF  -See plan as above  - Strict I's and O's and daily weights    5.  Paroxysmal SVT  - Continue carvedilol    6.  Acute on chronic respiratory failure-further management per primary team    7.  Sepsis secondary to COVID-19   - Management per primary team    8. History of hypertension  - Blood pressures running soft currently  - Continue carvedilol with hold parameters  - Continue midodrine    9.  Hyperlipidemia  - Continue atorvastatin    10. CKD stage 2     Subjective:   Patient seen and examined. No significant events since the last encounter.     REVIEW OF SYSTEMS:  Constitutional:  Denies fever or chills   Eyes:  Denies change in visual acuity   HENT:  Denies nasal congestion or sore throat   Respiratory:  Denies cough, orthopnea, PND or shortness of breath   Cardiovascular:  Denies chest pain, palpitations or edema   GI:  Denies abdominal pain, nausea, vomiting, bloody stools, constipation or diarrhea   :  Denies dysuria, frequency, difficulty in urination or nocturia  Musculoskeletal:  Denies  back pain or joint pain   Neurologic:  Denies headache, focal weakness or sensory changes   Endocrine:  Denies polyuria or polydipsia   Lymphatic:  Denies swollen glands   Psychiatric:  Denies depression or anxiety     Objective:   Vitals:  Vitals:    24 0714   BP:    Pulse: 98   Resp: 20   Temp:    SpO2:        Body mass index is 20.07 kg/m².  Systolic (24hrs), Av , Min:86 , Max:159     Diastolic (24hrs), Av, Min:54, Max:73    No intake or output data in the 24 hours ending 24 1306  Weight (last 2 days)       None                  PHYSICAL EXAMS  General:  Patient is not in acute distress, laying in the bed   Head: Normocephalic, Atraumatic.   HEENT: White sclera, pink conjunctiva  Neck:  Supple, no neck vein distention  Extremities: No edema, normal pulses  Integument:  No skin rashes or ulceration  Neurologic:  Patient is awake alert, responding to command, oriented to person, place and time    LABORATORY RESULTS:      CBC with diff:   Results from last 7 days   Lab Units 24  0541 24  0447 24  0556 24  0622 24  0434 24  0448   WBC Thousand/uL 14.74* 14.56* 7.10 4.08* 3.38* 5.53   HEMOGLOBIN g/dL 12.0 12.0 11.8 11.5 10.5* 10.9*   HEMATOCRIT % 38.2 38.2 36.3 35.5 32.6* 33.6*   MCV fL 97 95 95 94 95 95   PLATELETS Thousands/uL 165 187 189 188 172 160   RBC Million/uL 3.93 4.03 3.82 3.79* 3.43* 3.55*   MCH pg 30.5 29.8 30.9 30.3 30.6 30.7   MCHC g/dL 31.4 31.4 32.5 32.4 32.2 32.4   RDW % 12.7 12.2 12.2 12.2 12.1 12.0   MPV fL 11.5 11.2 10.9 10.7 11.1 10.5   NRBC AUTO /100 WBCs  --   --   --  0 0 0       CMP:  Results from last 7 days   Lab Units 24  0541 24  0600 24  0447   POTASSIUM mmol/L 3.6 3.6 3.4*   CHLORIDE mmol/L 100 101 101   CO2 mmol/L 30 31 33*   BUN mg/dL 43* 37* 37*   CREATININE mg/dL 1.18 1.18 0.93   CALCIUM mg/dL 9.2 9.8 9.5   EGFR ml/min/1.73sq m 44 44 59       BMP:  Results from last 7 days   Lab Units 24  0541  "24  0600 24  0447   POTASSIUM mmol/L 3.6 3.6 3.4*   CHLORIDE mmol/L 100 101 101   CO2 mmol/L 30 31 33*   BUN mg/dL 43* 37* 37*   CREATININE mg/dL 1.18 1.18 0.93   CALCIUM mg/dL 9.2 9.8 9.5              Results from last 7 days   Lab Units 24  0600 24  0622   MAGNESIUM mg/dL 1.5* 2.0                   Lipid Profile:   No results found for: \"CHOL\"  Lab Results   Component Value Date    HDL 55 2024    HDL 36 (L) 2021     Lab Results   Component Value Date    LDLCALC 139 (H) 2024    LDLCALC 95 2021     Lab Results   Component Value Date    TRIG 143 2024    TRIG 52 2021       Cardiac testing:   Results for orders placed during the hospital encounter of 21    Echo complete with contrast if indicated    [address]  [place] [address]  [phone]    Transthoracic Echocardiogram  2D, M-mode, Doppler, and Color Doppler    Study date:  27-Aug-2021    Patient: NICOLE HANNA  MR number: [id number]  Account number: [id number]  : 1945  Age: 75 years  Gender: Female  Status: Inpatient  Location: Emergency room  Height: 67 in  Weight: 103 lb  BP: 108/ 64 mmHg    Indications: Heart failure    Diagnoses: I50.9 - Heart failure, unspecified    Sonographer:  Naresh Noriega RDCS  Referring Physician:  EMORY LorenzoC  Group:  [facility]  Interpreting Physician:  Velma St MD    SUMMARY    LEFT VENTRICLE:  Systolic function was normal. Ejection fraction was estimated to be 60 %.  There were no regional wall motion abnormalities.  There was mild concentric hypertrophy.  Doppler parameters were consistent with abnormal left ventricular relaxation (grade 1 diastolic dysfunction).    RIGHT VENTRICLE:  The size was normal.  Systolic function was normal.    LEFT ATRIUM:  The atrium was mildly dilated.    MITRAL VALVE:  There was mild to moderate annular " calcification.  There was trace to mild regurgitation.    TRICUSPID VALVE:  There was trace to mild regurgitation.    IVC, HEPATIC VEINS:  The inferior vena cava was dilated.  The respirophasic change in diameter was more than 50%.    PERICARDIUM:  A small pericardial effusion was identified. There was no evidence of hemodynamic compromise.    HISTORY: PRIOR HISTORY: COPD; Hypertension; Hyperlipidemia; MI; Congestive heart failure; SOB; Chest pain; Smoker; CAD; Sepsis; Pneumonia    PROCEDURE: The procedure was performed in the emergency room. This was a routine study. The transthoracic approach was used. The study included complete 2D imaging, M-mode, complete spectral Doppler, and color Doppler. The heart rate was  95 bpm, at the start of the study. Images were obtained from the parasternal, apical, subcostal, and suprasternal notch acoustic windows. Echocardiographic views were limited due to lung interference. Image quality was adequate.    LEFT VENTRICLE: Size was normal. Systolic function was normal. Ejection fraction was estimated to be 60 %. There were no regional wall motion abnormalities. There was mild concentric hypertrophy. No evidence of apical thrombus. DOPPLER:  Doppler parameters were consistent with abnormal left ventricular relaxation (grade 1 diastolic dysfunction).    VENTRICULAR SEPTUM: There was mild sigmoid septal appearance.    RIGHT VENTRICLE: The size was normal. Systolic function was normal. Wall thickness was normal.    LEFT ATRIUM: The atrium was mildly dilated.    RIGHT ATRIUM: Size was normal.    MITRAL VALVE: There was mild to moderate annular calcification. There was normal leaflet separation. DOPPLER: The transmitral velocity was within the normal range. There was no evidence for stenosis. There was trace to mild regurgitation.    AORTIC VALVE: The valve was trileaflet. Leaflets exhibited normal thickness and normal cuspal separation. DOPPLER: Transaortic velocity was within the  normal range. There was no evidence for stenosis. There was no significant  regurgitation.    TRICUSPID VALVE: The valve structure was normal. There was normal leaflet separation. DOPPLER: The transtricuspid velocity was within the normal range. There was no evidence for stenosis. There was trace to mild regurgitation.    PULMONIC VALVE: Leaflets exhibited normal thickness, no calcification, and normal cuspal separation. DOPPLER: The transpulmonic velocity was within the normal range. There was no significant regurgitation.    PERICARDIUM: A small pericardial effusion was identified. There was no evidence of hemodynamic compromise. The pericardium was normal in appearance.    AORTA: The root exhibited normal size.    SYSTEMIC VEINS: IVC: The inferior vena cava was dilated. The respirophasic change in diameter was more than 50%.    SYSTEM MEASUREMENT TABLES    2D  %FS: 26.5 %  Ao Diam: 3.2 cm  Ao asc: 3.3 cm  EDV(Teich): 66.3 ml  EF(Teich): 52.5 %  ESV(Teich): 31.5 ml  IVSd: 1.1 cm  LA Area: 21.8 cm2  LA Diam: 3.5 cm  LVEDV MOD A4C: 95.7 ml  LVEF MOD A4C: 57.4 %  LVESV MOD A4C: 40.7 ml  LVIDd: 3.9 cm  LVIDs: 2.9 cm  LVLd A4C: 7.7 cm  LVLs A4C: 6.4 cm  LVPWd: 0.9 cm  RA Area: 12.7 cm2  RVIDd: 3.4 cm  SV MOD A4C: 55 ml  SV(Teich): 34.8 ml    CW  TR Vmax: 2.8 m/s  TR maxP.6 mmHg    MM  TAPSE: 2.1 cm    PW  E' Sept: 0.1 m/s  E/E' Sept: 13.7  MV A Jacobo: 1.5 m/s  MV Dec Hall: 5.4 m/s2  MV DecT: 237 ms  MV E Jacobo: 1.3 m/s  MV E/A Ratio: 0.9  MV PHT: 68.7 ms  MVA By PHT: 3.2 cm2    [employer] Accredited Echocardiography Laboratory    Prepared and electronically signed by    Velma St MD  Signed 27-Aug-2021 13:44:21    No results found for this or any previous visit.    No results found for this or any previous visit.    No valid procedures specified.  No results found for this or any previous visit.      Meds/Allergies   all current active meds have been reviewed  Medications Prior to Admission    Medication    albuterol (VENTOLIN HFA) 90 mcg/act inhaler    budesonide-formoterol (SYMBICORT) 160-4.5 mcg/act inhaler    guaiFENesin (MUCINEX) 600 mg 12 hr tablet    melatonin 3 mg    nitroglycerin (NITROSTAT) 0.4 mg SL tablet    aspirin (ECOTRIN LOW STRENGTH) 81 mg EC tablet    buPROPion (WELLBUTRIN) 75 mg tablet    Calcium-Magnesium-Vitamin D (CALCIUM 500 PO)    cariprazine (Vraylar) 1.5 MG capsule    carvedilol (COREG) 25 mg tablet    hydrochlorothiazide (HYDRODIURIL) 25 mg tablet    HYDROcodone-acetaminophen (NORCO) 5-325 mg per tablet    hydrOXYzine HCL (ATARAX) 25 mg tablet    ipratropium (ATROVENT) 0.02 % nebulizer solution    lidocaine (LIDODERM) 5 %    meclizine (ANTIVERT) 25 mg tablet    pantoprazole (PROTONIX) 40 mg tablet    vilazodone (VIIBRYD) 40 mg tablet    Zanubrutinib 80 MG CAPS              ** Please Note: Dragon 360 Dictation voice to text software may have been used in the creation of this document. **

## 2024-02-25 LAB
ANION GAP SERPL CALCULATED.3IONS-SCNC: 8 MMOL/L
BACTERIA BLD CULT: NORMAL
BACTERIA BLD CULT: NORMAL
BUN SERPL-MCNC: 41 MG/DL (ref 5–25)
CALCIUM SERPL-MCNC: 9.6 MG/DL (ref 8.4–10.2)
CHLORIDE SERPL-SCNC: 100 MMOL/L (ref 96–108)
CO2 SERPL-SCNC: 32 MMOL/L (ref 21–32)
CREAT SERPL-MCNC: 1.11 MG/DL (ref 0.6–1.3)
ERYTHROCYTE [DISTWIDTH] IN BLOOD BY AUTOMATED COUNT: 12.7 % (ref 11.6–15.1)
GFR SERPL CREATININE-BSD FRML MDRD: 48 ML/MIN/1.73SQ M
GLUCOSE SERPL-MCNC: 122 MG/DL (ref 65–140)
HCT VFR BLD AUTO: 36.8 % (ref 34.8–46.1)
HGB BLD-MCNC: 11.8 G/DL (ref 11.5–15.4)
MAGNESIUM SERPL-MCNC: 2.4 MG/DL (ref 1.9–2.7)
MCH RBC QN AUTO: 30.3 PG (ref 26.8–34.3)
MCHC RBC AUTO-ENTMCNC: 32.1 G/DL (ref 31.4–37.4)
MCV RBC AUTO: 94 FL (ref 82–98)
PLATELET # BLD AUTO: 226 THOUSANDS/UL (ref 149–390)
PMV BLD AUTO: 12.2 FL (ref 8.9–12.7)
POTASSIUM SERPL-SCNC: 3.6 MMOL/L (ref 3.5–5.3)
PROCALCITONIN SERPL-MCNC: 0.3 NG/ML
RBC # BLD AUTO: 3.9 MILLION/UL (ref 3.81–5.12)
SODIUM SERPL-SCNC: 140 MMOL/L (ref 135–147)
WBC # BLD AUTO: 13.88 THOUSAND/UL (ref 4.31–10.16)

## 2024-02-25 PROCEDURE — 85027 COMPLETE CBC AUTOMATED: CPT | Performed by: STUDENT IN AN ORGANIZED HEALTH CARE EDUCATION/TRAINING PROGRAM

## 2024-02-25 PROCEDURE — 94760 N-INVAS EAR/PLS OXIMETRY 1: CPT

## 2024-02-25 PROCEDURE — 99232 SBSQ HOSP IP/OBS MODERATE 35: CPT | Performed by: INTERNAL MEDICINE

## 2024-02-25 PROCEDURE — 94664 DEMO&/EVAL PT USE INHALER: CPT

## 2024-02-25 PROCEDURE — 83735 ASSAY OF MAGNESIUM: CPT | Performed by: STUDENT IN AN ORGANIZED HEALTH CARE EDUCATION/TRAINING PROGRAM

## 2024-02-25 PROCEDURE — 94640 AIRWAY INHALATION TREATMENT: CPT

## 2024-02-25 PROCEDURE — 84145 PROCALCITONIN (PCT): CPT | Performed by: STUDENT IN AN ORGANIZED HEALTH CARE EDUCATION/TRAINING PROGRAM

## 2024-02-25 PROCEDURE — 80048 BASIC METABOLIC PNL TOTAL CA: CPT | Performed by: STUDENT IN AN ORGANIZED HEALTH CARE EDUCATION/TRAINING PROGRAM

## 2024-02-25 RX ORDER — PREDNISONE 20 MG/1
40 TABLET ORAL DAILY
Status: DISCONTINUED | OUTPATIENT
Start: 2024-02-26 | End: 2024-02-28

## 2024-02-25 RX ORDER — BENZONATATE 100 MG/1
100 CAPSULE ORAL 3 TIMES DAILY
Status: DISCONTINUED | OUTPATIENT
Start: 2024-02-25 | End: 2024-02-25

## 2024-02-25 RX ORDER — BENZONATATE 100 MG/1
100 CAPSULE ORAL 3 TIMES DAILY PRN
Status: DISCONTINUED | OUTPATIENT
Start: 2024-02-25 | End: 2024-02-25

## 2024-02-25 RX ORDER — FUROSEMIDE 20 MG/1
20 TABLET ORAL DAILY
Status: DISCONTINUED | OUTPATIENT
Start: 2024-02-26 | End: 2024-03-07 | Stop reason: HOSPADM

## 2024-02-25 RX ORDER — BENZONATATE 100 MG/1
100 CAPSULE ORAL 3 TIMES DAILY
Status: COMPLETED | OUTPATIENT
Start: 2024-02-25 | End: 2024-02-28

## 2024-02-25 RX ORDER — HEPARIN SODIUM 5000 [USP'U]/ML
5000 INJECTION, SOLUTION INTRAVENOUS; SUBCUTANEOUS EVERY 8 HOURS SCHEDULED
Status: DISCONTINUED | OUTPATIENT
Start: 2024-02-25 | End: 2024-03-07 | Stop reason: HOSPADM

## 2024-02-25 RX ADMIN — PANTOPRAZOLE SODIUM 40 MG: 40 TABLET, DELAYED RELEASE ORAL at 08:21

## 2024-02-25 RX ADMIN — IPRATROPIUM BROMIDE 0.5 MG: 0.5 SOLUTION RESPIRATORY (INHALATION) at 19:27

## 2024-02-25 RX ADMIN — LIDOCAINE 5% 2 PATCH: 700 PATCH TOPICAL at 23:47

## 2024-02-25 RX ADMIN — IPRATROPIUM BROMIDE 0.5 MG: 0.5 SOLUTION RESPIRATORY (INHALATION) at 07:34

## 2024-02-25 RX ADMIN — PRAVASTATIN SODIUM 80 MG: 80 TABLET ORAL at 18:17

## 2024-02-25 RX ADMIN — BENZONATATE 100 MG: 100 CAPSULE ORAL at 16:31

## 2024-02-25 RX ADMIN — VILAZODONE HYDROCHLORIDE 40 MG: 20 TABLET ORAL at 08:24

## 2024-02-25 RX ADMIN — BUPROPION HYDROCHLORIDE 75 MG: 75 TABLET, FILM COATED ORAL at 08:21

## 2024-02-25 RX ADMIN — BARICITINIB 2 MG: 2 TABLET, FILM COATED ORAL at 08:23

## 2024-02-25 RX ADMIN — LEVALBUTEROL HYDROCHLORIDE 1.25 MG: 1.25 SOLUTION RESPIRATORY (INHALATION) at 07:34

## 2024-02-25 RX ADMIN — FUROSEMIDE 20 MG: 20 TABLET ORAL at 08:19

## 2024-02-25 RX ADMIN — MIDODRINE HYDROCHLORIDE 2.5 MG: 5 TABLET ORAL at 12:32

## 2024-02-25 RX ADMIN — BUDESONIDE AND FORMOTEROL FUMARATE DIHYDRATE 2 PUFF: 160; 4.5 AEROSOL RESPIRATORY (INHALATION) at 08:22

## 2024-02-25 RX ADMIN — GUAIFENESIN 1200 MG: 600 TABLET ORAL at 21:27

## 2024-02-25 RX ADMIN — MIDODRINE HYDROCHLORIDE 2.5 MG: 5 TABLET ORAL at 16:32

## 2024-02-25 RX ADMIN — LEVALBUTEROL HYDROCHLORIDE 1.25 MG: 1.25 SOLUTION RESPIRATORY (INHALATION) at 19:27

## 2024-02-25 RX ADMIN — ZANUBRUTINIB 160 MG: 80 CAPSULE, GELATIN COATED ORAL at 18:18

## 2024-02-25 RX ADMIN — ACETAMINOPHEN 975 MG: 325 TABLET, FILM COATED ORAL at 23:40

## 2024-02-25 RX ADMIN — CEFTRIAXONE 1000 MG: 1 INJECTION, SOLUTION INTRAVENOUS at 08:22

## 2024-02-25 RX ADMIN — ASPIRIN 81 MG: 81 TABLET, COATED ORAL at 08:21

## 2024-02-25 RX ADMIN — GUAIFENESIN 1200 MG: 600 TABLET ORAL at 08:19

## 2024-02-25 RX ADMIN — HEPARIN SODIUM 5000 UNITS: 5000 INJECTION INTRAVENOUS; SUBCUTANEOUS at 16:32

## 2024-02-25 RX ADMIN — BUDESONIDE AND FORMOTEROL FUMARATE DIHYDRATE 2 PUFF: 160; 4.5 AEROSOL RESPIRATORY (INHALATION) at 18:17

## 2024-02-25 RX ADMIN — HEPARIN SODIUM 5000 UNITS: 5000 INJECTION INTRAVENOUS; SUBCUTANEOUS at 21:26

## 2024-02-25 RX ADMIN — CARVEDILOL 25 MG: 12.5 TABLET, FILM COATED ORAL at 16:31

## 2024-02-25 RX ADMIN — ALBUTEROL SULFATE 2.5 MG: 2.5 SOLUTION RESPIRATORY (INHALATION) at 17:01

## 2024-02-25 RX ADMIN — Medication 3 MG: at 21:27

## 2024-02-25 RX ADMIN — METHYLPREDNISOLONE SODIUM SUCCINATE 40 MG: 40 INJECTION, POWDER, FOR SOLUTION INTRAMUSCULAR; INTRAVENOUS at 08:21

## 2024-02-25 RX ADMIN — ZANUBRUTINIB 160 MG: 80 CAPSULE, GELATIN COATED ORAL at 08:25

## 2024-02-25 RX ADMIN — CARVEDILOL 25 MG: 12.5 TABLET, FILM COATED ORAL at 08:19

## 2024-02-25 RX ADMIN — MIDODRINE HYDROCHLORIDE 2.5 MG: 5 TABLET ORAL at 08:20

## 2024-02-25 RX ADMIN — BENZONATATE 100 MG: 100 CAPSULE ORAL at 21:27

## 2024-02-25 RX ADMIN — BENZONATATE 100 MG: 100 CAPSULE ORAL at 12:32

## 2024-02-25 NOTE — PROGRESS NOTES
General Cardiology   Progress Note   Lauren Quintero 78 y.o. female MRN: [id number]  Unit/Bed#: -01 Encounter: [id number]    Assessment/Plan:    Elevated troponin  -S/p coronary angiography which revealed multi-vessel CAD with a patent mRCA stent, proximal to mLAD 80% stenosis, mLAD 97% stenosis, mCx 85% stenosis in first marginal 75% stenosis and mid RCA 70% stenosis  -Continue aspirin, carvedilol, pravastatin  -Continue to monitor on telemetry  -Plan for CT surgery evaluation or high risk PCI once patient is overall medically recovered from COVID-19, pneumonia and sepsis     2.  Multi-vessel CAD  - See plan as above     3.  New ischemic cardiomyopathy with an EF of 35%  - Continue carvedilol and furosemide  -Discussed LifeVest with patient, she is willing to wear it  - Order submitted to [facility] and case management notified  - Further GDMT to be added on an outpatient basis, patient currently hypotensive at times     4.  New onset HFrEF  -See plan as above  - Strict I's and O's and daily weights     5.  Paroxysmal SVT  - Continue carvedilol     6.  Acute on chronic respiratory failure  -further management per primary team     7.  Sepsis secondary to COVID-19   - Management per primary team     8. History of hypertension  - Blood pressures running soft currently  - Continue carvedilol and furosemide with hold parameters  - Continue midodrine     9.  Hyperlipidemia  - Continue pravastatin     10. CKD stage 2       Subjective:   Patient seen and examined. No significant events since the last encounter.     REVIEW OF SYSTEMS:  Constitutional:  Denies fever or chills   Eyes:  Denies change in visual acuity   HENT:  Denies nasal congestion or sore throat   Respiratory:  Denies cough, orthopnea, PND, +  shortness of breath   Cardiovascular:  Denies chest pain, palpitations or edema   GI:  Denies abdominal pain, nausea, vomiting, bloody stools, constipation or diarrhea   :  Denies dysuria, frequency, difficulty in  urination or nocturia  Musculoskeletal:  Denies back pain or joint pain   Neurologic:  Denies headache, focal weakness or sensory changes   Endocrine:  Denies polyuria or polydipsia   Lymphatic:  Denies swollen glands   Psychiatric:  Denies depression or anxiety     Objective:   Vitals:  Vitals:    24 0738   BP:    Pulse: 85   Resp: 18   Temp:    SpO2:        Body mass index is 20.07 kg/m².  Systolic (24hrs), Av , Min:118 , Max:149     Diastolic (24hrs), Av, Min:56, Max:77      Intake/Output Summary (Last 24 hours) at 2024 0831  Last data filed at 2024 1851  Gross per 24 hour   Intake --   Output 600 ml   Net -600 ml     Weight (last 2 days)       None            Telemetry Review: No significant arrhythmias seen on telemetry review.     PHYSICAL EXAMS  General:  Patient is not in acute distress, laying in the bed comfortably, awake  Head: Normocephalic, Atraumatic.   HEENT: White sclera, pink conjunctiva  Neck:  Supple, no neck vein distention  GI:  Abdomen soft, nontender, non-distended  Extremities: No edema  Integument:  No skin rashes or ulceration  Neurologic:  Patient is awake alert, responding to command, oriented to person, place and time    LABORATORY RESULTS:      CBC with diff:   Results from last 7 days   Lab Units 24  0524 24  0541 24  0447 24  0556 24  0622 24  0434   WBC Thousand/uL 13.88* 14.74* 14.56*   < > 4.08* 3.38*   HEMOGLOBIN g/dL 11.8 12.0 12.0   < > 11.5 10.5*   HEMATOCRIT % 36.8 38.2 38.2   < > 35.5 32.6*   MCV fL 94 97 95   < > 94 95   PLATELETS Thousands/uL 226 165 187   < > 188 172   RBC Million/uL 3.90 3.93 4.03   < > 3.79* 3.43*   MCH pg 30.3 30.5 29.8   < > 30.3 30.6   MCHC g/dL 32.1 31.4 31.4   < > 32.4 32.2   RDW % 12.7 12.7 12.2   < > 12.2 12.1   MPV fL 12.2 11.5 11.2   < > 10.7 11.1   NRBC AUTO /100 WBCs  --   --   --   --  0 0    < > = values in this interval not displayed.       CMP:  Results from last 7 days   Lab  "Units 24  0524 24  0541 24  0600   POTASSIUM mmol/L 3.6 3.6 3.6   CHLORIDE mmol/L 100 100 101   CO2 mmol/L 32 30 31   BUN mg/dL 41* 43* 37*   CREATININE mg/dL 1.11 1.18 1.18   CALCIUM mg/dL 9.6 9.2 9.8   EGFR ml/min/1.73sq m 48 44 44       BMP:  Results from last 7 days   Lab Units 24  0524 24  0541 24  0600   POTASSIUM mmol/L 3.6 3.6 3.6   CHLORIDE mmol/L 100 100 101   CO2 mmol/L 32 30 31   BUN mg/dL 41* 43* 37*   CREATININE mg/dL 1.11 1.18 1.18   CALCIUM mg/dL 9.6 9.2 9.8              Results from last 7 days   Lab Units 24  0524 24  0600 24  0622   MAGNESIUM mg/dL 2.4 1.5* 2.0                   Lipid Profile:   No results found for: \"CHOL\"  Lab Results   Component Value Date    HDL 55 2024    HDL 36 (L) 2021     Lab Results   Component Value Date    LDLCALC 139 (H) 2024    LDLCALC 95 2021     Lab Results   Component Value Date    TRIG 143 2024    TRIG 52 2021       Cardiac testing:   Results for orders placed during the hospital encounter of 21    Echo complete with contrast if indicated    Narrative  [address]  [place] [address] [phone]    Transthoracic Echocardiogram  2D, M-mode, Doppler, and Color Doppler    Study date:  27-Aug-2021    Patient: NICOLE HANNA  MR number: [id number]  Account number: [id number]  : 1945  Age: 75 years  Gender: Female  Status: Inpatient  Location: Emergency room  Height: 67 in  Weight: 103 lb  BP: 108/ 64 mmHg    Indications: Heart failure    Diagnoses: I50.9 - Heart failure, unspecified    Sonographer:  Naresh Noriega RDCS  Referring Physician:  Stephanie Day PA-C  Group:  [facility]  Interpreting Physician:  Velma St MD    SUMMARY    LEFT VENTRICLE:  Systolic function was normal. Ejection fraction was estimated to be 60 %.  There were no regional wall motion abnormalities.  There was " mild concentric hypertrophy.  Doppler parameters were consistent with abnormal left ventricular relaxation (grade 1 diastolic dysfunction).    RIGHT VENTRICLE:  The size was normal.  Systolic function was normal.    LEFT ATRIUM:  The atrium was mildly dilated.    MITRAL VALVE:  There was mild to moderate annular calcification.  There was trace to mild regurgitation.    TRICUSPID VALVE:  There was trace to mild regurgitation.    IVC, HEPATIC VEINS:  The inferior vena cava was dilated.  The respirophasic change in diameter was more than 50%.    PERICARDIUM:  A small pericardial effusion was identified. There was no evidence of hemodynamic compromise.    HISTORY: PRIOR HISTORY: COPD; Hypertension; Hyperlipidemia; MI; Congestive heart failure; SOB; Chest pain; Smoker; CAD; Sepsis; Pneumonia    PROCEDURE: The procedure was performed in the emergency room. This was a routine study. The transthoracic approach was used. The study included complete 2D imaging, M-mode, complete spectral Doppler, and color Doppler. The heart rate was  95 bpm, at the start of the study. Images were obtained from the parasternal, apical, subcostal, and suprasternal notch acoustic windows. Echocardiographic views were limited due to lung interference. Image quality was adequate.    LEFT VENTRICLE: Size was normal. Systolic function was normal. Ejection fraction was estimated to be 60 %. There were no regional wall motion abnormalities. There was mild concentric hypertrophy. No evidence of apical thrombus. DOPPLER:  Doppler parameters were consistent with abnormal left ventricular relaxation (grade 1 diastolic dysfunction).    VENTRICULAR SEPTUM: There was mild sigmoid septal appearance.    RIGHT VENTRICLE: The size was normal. Systolic function was normal. Wall thickness was normal.    LEFT ATRIUM: The atrium was mildly dilated.    RIGHT ATRIUM: Size was normal.    MITRAL VALVE: There was mild to moderate annular calcification. There was normal  leaflet separation. DOPPLER: The transmitral velocity was within the normal range. There was no evidence for stenosis. There was trace to mild regurgitation.    AORTIC VALVE: The valve was trileaflet. Leaflets exhibited normal thickness and normal cuspal separation. DOPPLER: Transaortic velocity was within the normal range. There was no evidence for stenosis. There was no significant  regurgitation.    TRICUSPID VALVE: The valve structure was normal. There was normal leaflet separation. DOPPLER: The transtricuspid velocity was within the normal range. There was no evidence for stenosis. There was trace to mild regurgitation.    PULMONIC VALVE: Leaflets exhibited normal thickness, no calcification, and normal cuspal separation. DOPPLER: The transpulmonic velocity was within the normal range. There was no significant regurgitation.    PERICARDIUM: A small pericardial effusion was identified. There was no evidence of hemodynamic compromise. The pericardium was normal in appearance.    AORTA: The root exhibited normal size.    SYSTEMIC VEINS: IVC: The inferior vena cava was dilated. The respirophasic change in diameter was more than 50%.    SYSTEM MEASUREMENT TABLES    2D  %FS: 26.5 %  Ao Diam: 3.2 cm  Ao asc: 3.3 cm  EDV(Teich): 66.3 ml  EF(Teich): 52.5 %  ESV(Teich): 31.5 ml  IVSd: 1.1 cm  LA Area: 21.8 cm2  LA Diam: 3.5 cm  LVEDV MOD A4C: 95.7 ml  LVEF MOD A4C: 57.4 %  LVESV MOD A4C: 40.7 ml  LVIDd: 3.9 cm  LVIDs: 2.9 cm  LVLd A4C: 7.7 cm  LVLs A4C: 6.4 cm  LVPWd: 0.9 cm  RA Area: 12.7 cm2  RVIDd: 3.4 cm  SV MOD A4C: 55 ml  SV(Teich): 34.8 ml    CW  TR Vmax: 2.8 m/s  TR maxP.6 mmHg    MM  TAPSE: 2.1 cm    PW  E' Sept: 0.1 m/s  E/E' Sept: 13.7  MV A Jacobo: 1.5 m/s  MV Dec [place]: 5.4 m/s2  MV DecT: 237 ms  MV E Jacobo: 1.3 m/s  MV E/A Ratio: 0.9  MV PHT: 68.7 ms  MVA By PHT: 3.2 cm2    [employer] Accredited Echocardiography Laboratory    Prepared and electronically signed by    Velma St MD  Signed  27-Aug-2021 13:44:21    No results found for this or any previous visit.    No results found for this or any previous visit.    No valid procedures specified.  No results found for this or any previous visit.      Meds/Allergies   all current active meds have been reviewed  Medications Prior to Admission   Medication    albuterol (VENTOLIN HFA) 90 mcg/act inhaler    budesonide-formoterol (SYMBICORT) 160-4.5 mcg/act inhaler    guaiFENesin (MUCINEX) 600 mg 12 hr tablet    melatonin 3 mg    nitroglycerin (NITROSTAT) 0.4 mg SL tablet    aspirin (ECOTRIN LOW STRENGTH) 81 mg EC tablet    buPROPion (WELLBUTRIN) 75 mg tablet    Calcium-Magnesium-Vitamin D (CALCIUM 500 PO)    cariprazine (Vraylar) 1.5 MG capsule    carvedilol (COREG) 25 mg tablet    hydrochlorothiazide (HYDRODIURIL) 25 mg tablet    HYDROcodone-acetaminophen (NORCO) 5-325 mg per tablet    hydrOXYzine HCL (ATARAX) 25 mg tablet    ipratropium (ATROVENT) 0.02 % nebulizer solution    lidocaine (LIDODERM) 5 %    meclizine (ANTIVERT) 25 mg tablet    pantoprazole (PROTONIX) 40 mg tablet    vilazodone (VIIBRYD) 40 mg tablet    Zanubrutinib 80 MG CAPS              ** Please Note: Dragon 360 Dictation voice to text software may have been used in the creation of this document. **

## 2024-02-25 NOTE — ASSESSMENT & PLAN NOTE
Lab Results   Component Value Date    HSTNI0 2,584 (H) 02/17/2024    HSTNI2 2,049 (H) 02/17/2024    HSTNID2 -535 02/17/2024      CT chest showing no evidence of pulmonary embolism, did note mild pulmonary edema  No chest pain present, but significant SOB, anginal equivalent, sudden onset, fairly elevated troponins  EKG: Sinus Tachycardia with no obvious STEMI  Suspect a true type I NSTEMI  ADRIEN Score: 5  The patient's presentation with shortness of breath was somewhat sudden according to the son, he was driving her home from the rehab facility if she got acutely short of breath and hypoxemic again.  Certainly it is possible that the COVID infection may have triggered some degree of underlying CAD leading to an NSTEMI.  Can stop heparin drip  Cardiology following.  Cardiac cath showed significant triple vessel disease with 90% stenosis in the mid LAD, 80% stenosis in the proximal LAD, 85% stenosis in the mid Cx, and 70% in the RCA with a patent mid RCA stent. Cardiology recommends maximizing medical therapy for her CAD and follow-up outpatient with a CT surgeon for a potential CABG once she fully recovers from COVID.  Given new reduced EF of 35%, Lifevest has been fitted today  Patient is ready for rehab tomorrow

## 2024-02-25 NOTE — ASSESSMENT & PLAN NOTE
Continue home meds; initiate lisinopril as part of NSTEMI management  Continue carvedilol  PRN IV Labetalol for SBP > 160  Resume lasix 20 mg po daily  Continue to monitor blood pressure

## 2024-02-25 NOTE — ASSESSMENT & PLAN NOTE
Worsening Sob, wheezing, increased o2 requirements as above.   scheduled nebulizer treatment  Transition to po prednisone tomorrow

## 2024-02-25 NOTE — PLAN OF CARE
Problem: Potential for Falls  Goal: Patient will remain free of falls  Description: INTERVENTIONS:  - Educate patient/family on patient safety including physical limitations  - Instruct patient to call for assistance with activity   - Consult OT/PT to assist with strengthening/mobility   - Keep Call bell within reach  - Keep bed low and locked with side rails adjusted as appropriate  - Keep care items and personal belongings within reach  - Initiate and maintain comfort rounds  - Make Fall Risk Sign visible to staff  - Offer Toileting every 2 Hours, in advance of need  - Initiate/Maintain bed and chair alarm  - Obtain necessary fall risk management equipment: non skid socks  - Apply yellow socks and bracelet for high fall risk patients  - Consider moving patient to room near nurses station  Outcome: Progressing     Problem: RESPIRATORY - ADULT  Goal: Achieves optimal ventilation and oxygenation  Description: INTERVENTIONS:  - Assess for changes in respiratory status  - Assess for changes in mentation and behavior  - Position to facilitate oxygenation and minimize respiratory effort  - Oxygen administered by appropriate delivery if ordered  - Initiate smoking cessation education as indicated  - Encourage broncho-pulmonary hygiene including cough, deep breathe, Incentive Spirometry  - Assess the need for suctioning and aspirate as needed  - Assess and instruct to report SOB or any respiratory difficulty  - Respiratory Therapy support as indicated  Outcome: Progressing     Problem: Prexisting or High Potential for Compromised Skin Integrity  Goal: Skin integrity is maintained or improved  Description: INTERVENTIONS:  - Identify patients at risk for skin breakdown  - Assess and monitor skin integrity  - Assess and monitor nutrition and hydration status  - Monitor labs   - Assess for incontinence   - Turn and reposition patient  - Assist with mobility/ambulation  - Relieve pressure over bony prominences  - Avoid  friction and shearing  - Provide appropriate hygiene as needed including keeping skin clean and dry  - Evaluate need for skin moisturizer/barrier cream  - Collaborate with interdisciplinary team   - Patient/family teaching  - Consider wound care consult   Outcome: Progressing     Problem: PAIN - ADULT  Goal: Verbalizes/displays adequate comfort level or baseline comfort level  Description: Interventions:  - Encourage patient to monitor pain and request assistance  - Assess pain using appropriate pain scale  - Administer analgesics based on type and severity of pain and evaluate response  - Implement non-pharmacological measures as appropriate and evaluate response  - Consider cultural and social influences on pain and pain management  - Notify physician/advanced practitioner if interventions unsuccessful or patient reports new pain  Outcome: Progressing     Problem: INFECTION - ADULT  Goal: Absence or prevention of progression during hospitalization  Description: INTERVENTIONS:  - Assess and monitor for signs and symptoms of infection  - Monitor lab/diagnostic results  - Monitor all insertion sites, i.e. indwelling lines, tubes, and drains  - Monitor endotracheal if appropriate and nasal secretions for changes in amount and color  - [place] appropriate cooling/warming therapies per order  - Administer medications as ordered  - Instruct and encourage patient and family to use good hand hygiene technique  - Identify and instruct in appropriate isolation precautions for identified infection/condition  Outcome: Progressing  Goal: Absence of fever/infection during neutropenic period  Description: INTERVENTIONS:  - Monitor WBC    Outcome: Progressing     Problem: SAFETY ADULT  Goal: Patient will remain free of falls  Description: INTERVENTIONS:  - Educate patient/family on patient safety including physical limitations  - Instruct patient to call for assistance with activity   - Consult OT/PT to assist with  strengthening/mobility   - Keep Call bell within reach  - Keep bed low and locked with side rails adjusted as appropriate  - Keep care items and personal belongings within reach  - Initiate and maintain comfort rounds  - Make Fall Risk Sign visible to staff  - Offer Toileting every 2 Hours, in advance of need  - Initiate/Maintain bed and chair alarm  - Obtain necessary fall risk management equipment: non skid socks  - Apply yellow socks and bracelet for high fall risk patients  - Consider moving patient to room near nurses station  Outcome: Progressing  Goal: Maintain or return to baseline ADL function  Description: INTERVENTIONS:  -  Assess patient's ability to carry out ADLs; assess patient's baseline for ADL function and identify physical deficits which impact ability to perform ADLs (bathing, care of mouth/teeth, toileting, grooming, dressing, etc.)  - Assess/evaluate cause of self-care deficits   - Assess range of motion  - Assess patient's mobility; develop plan if impaired  - Assess patient's need for assistive devices and provide as appropriate  - Encourage maximum independence but intervene and supervise when necessary  - Involve family in performance of ADLs  - Assess for home care needs following discharge   - Consider OT consult to assist with ADL evaluation and planning for discharge  - Provide patient education as appropriate  Outcome: Progressing  Goal: Maintains/Returns to pre admission functional level  Description: INTERVENTIONS:  - Perform AM-PAC 6 Click Basic Mobility/ Daily Activity assessment daily.  - Set and communicate daily mobility goal to care team and patient/family/caregiver.   - Collaborate with rehabilitation services on mobility goals if consulted  - Perform Range of Motion 4 times a day.  - Reposition patient every 2 hours.  - Dangle patient 4 times a day  - Stand patient 3 times a day  - Ambulate patient 3 times a day  - Out of bed to chair 3 times a day   - Out of bed for meals 3  times a day  - Out of bed for toileting  - Record patient progress and toleration of activity level   Outcome: Progressing     Problem: DISCHARGE PLANNING  Goal: Discharge to home or other facility with appropriate resources  Description: INTERVENTIONS:  - Identify barriers to discharge w/patient and caregiver  - Arrange for needed discharge resources and transportation as appropriate  - Identify discharge learning needs (meds, wound care, etc.)  - Arrange for interpretive services to assist at discharge as needed  - Refer to Case Management Department for coordinating discharge planning if the patient needs post-hospital services based on physician/advanced practitioner order or complex needs related to functional status, cognitive ability, or social support system  Outcome: Progressing     Problem: Knowledge Deficit  Goal: Patient/family/caregiver demonstrates understanding of disease process, treatment plan, medications, and discharge instructions  Description: Complete learning assessment and assess knowledge base.  Interventions:  - Provide teaching at level of understanding  - Provide teaching via preferred learning methods  Outcome: Progressing     Problem: MOBILITY - ADULT  Goal: Maintain or return to baseline ADL function  Description: INTERVENTIONS:  -  Assess patient's ability to carry out ADLs; assess patient's baseline for ADL function and identify physical deficits which impact ability to perform ADLs (bathing, care of mouth/teeth, toileting, grooming, dressing, etc.)  - Assess/evaluate cause of self-care deficits   - Assess range of motion  - Assess patient's mobility; develop plan if impaired  - Assess patient's need for assistive devices and provide as appropriate  - Encourage maximum independence but intervene and supervise when necessary  - Involve family in performance of ADLs  - Assess for home care needs following discharge   - Consider OT consult to assist with ADL evaluation and planning for  discharge  - Provide patient education as appropriate  Outcome: Progressing  Goal: Maintains/Returns to pre admission functional level  Description: INTERVENTIONS:  - Perform AM-PAC 6 Click Basic Mobility/ Daily Activity assessment daily.  - Set and communicate daily mobility goal to care team and patient/family/caregiver.   - Collaborate with rehabilitation services on mobility goals if consulted  - Perform Range of Motion 4 times a day.  - Reposition patient every 2 hours.  - Dangle patient 4 times a day  - Stand patient 3 times a day  - Ambulate patient 3 times a day  - Out of bed to chair 3 times a day   - Out of bed for meals 3 times a day  - Out of bed for toileting  - Record patient progress and toleration of activity level   Outcome: Progressing

## 2024-02-25 NOTE — ASSESSMENT & PLAN NOTE
Currently SpO2: 95 % on Nasal Cannula O2 Flow Rate (L/min): 4 L/min    At baseline, uses 4L NC  CT Imaging showing mild pulm edema, scattered areas of tree-in-bud opacity  Continue to treat underlying NSTEMI, COVID, mild pulm edema  2/23 repeat chest xray Unchanged appearance of mild interstitial edema.Right basilar opacity likely representing small effusion with atelectasis. Please note pneumonia may demonstrate a similar appearance the appropriate clinical setting.  Pt still has cough, but sob has improved. Switch Solu-Medrol to prednisone.  Procalcitonin 0.33, IV ceftriaxone. recheck procalcitonin, plan to complete 3 doses  Oxygen wean down to baseline 4L NC

## 2024-02-25 NOTE — RESPIRATORY THERAPY NOTE
RT Protocol Note  Lauren Quintero 78 y.o. female MRN: [id number]  Unit/Bed#: -01 Encounter: [id number]    Assessment    Principal Problem:    NSTEMI (non-ST elevated myocardial infarction) ([facility])  Active Problems:    Essential hypertension    Acute on chronic respiratory failure with hypoxia ([facility])    Major depressive disorder, recurrent episode, moderate ([facility])    COPD with exacerbation ([facility])    COVID-19    CKD (chronic kidney disease)      Home Pulmonary Medications:  Inhalers/neb    Home Devices/Therapy: Home O2    Past Medical History:   Diagnosis Date    Acute congestive heart failure (HCC) 2021    Anxiety     Cardiac disease     Chest pain 2021    Chronic pain disorder     COPD (chronic obstructive pulmonary disease) ([facility])     Depression     Heart disease     Hyperlipidemia     Hypertension     MI (myocardial infarction) ([facility])     MRSA (methicillin resistant Staphylococcus aureus)     Renal disorder     benign kidney tumor     Social History     Socioeconomic History    Marital status:      Spouse name: None    Number of children: 3    Years of education: 13    Highest education level: High school graduate   Occupational History    Occupation:      Employer: [employer]     Comment: retired    Tobacco Use    Smoking status: Former     Current packs/day: 0.00     Average packs/day: 1 pack/day for 60.0 years (60.0 ttl pk-yrs)     Types: Cigarettes     Start date:      Quit date: 2016     Years since quittin.1    Smokeless tobacco: Never    Tobacco comments:     She has close to a 60 pack-year smoking history, most recently has cut down to 4-5 cigarettes daily   Vaping Use    Vaping status: Never Used   Substance and Sexual Activity    Alcohol use: Never    Drug use: No    Sexual activity: Not Currently   Other Topics Concern    None   Social History Narrative    None     Social Determinants of Health     Financial Resource Strain: Patient Declined  (1/6/2024)    Received from [facility] [employer]    Overall Financial Resource Strain (CARDIA)     Difficulty of Paying Living Expenses: Patient declined   Food Insecurity: No Food Insecurity (2/19/2024)    Hunger Vital Sign     Worried About Running Out of Food in the Last Year: Never true     Ran Out of Food in the Last Year: Never true   Transportation Needs: No Transportation Needs (2/19/2024)    PRAPARE - Transportation     Lack of Transportation (Medical): No     Lack of Transportation (Non-Medical): No   Physical Activity: Patient Declined (1/6/2024)    Received from [facility] [employer]    Exercise Vital Sign     Days of Exercise per Week: Patient declined     Minutes of Exercise per Session: Patient declined   Stress: Patient Declined (1/6/2024)    Received from [facility] [employer]    [place] [employer] - Occupational Stress Questionnaire     Feeling of Stress : Patient declined   Social Connections: Patient Declined (1/6/2024)    Received from [facility] [employer]    Social Connection and Isolation Panel [NHANES]     Frequency of Communication with Friends and Family: Patient declined     Frequency of Social Gatherings with Friends and Family: Patient declined     Attends Faith Services: Patient declined     Active Member of Clubs or Organizations: Patient declined     Attends Club or Organization Meetings: Patient declined     Marital Status: Patient declined   Intimate Partner Violence: Patient Declined (1/6/2024)    Received from [facility] [employer]    Humiliation, Afraid, Rape, and Kick questionnaire     Fear of Current or Ex-Partner: Patient declined     Emotionally Abused: Patient declined     Physically Abused: Patient declined     Sexually Abused: Patient declined   Housing Stability: Low Risk  (2/19/2024)    Housing Stability Vital Sign     Unable to Pay for Housing in the Last Year: No     Number of Places Lived in the Last Year: 2     Unstable Housing in the Last Year: No  "      Subjective    Subjective Data: awake and alert    Objective    Physical Exam:   Assessment Type: During-treatment  General Appearance: Drowsy  Respiratory Pattern: Normal  Chest Assessment: Chest expansion symmetrical  Bilateral Breath Sounds: Diminished  R Breath Sounds: Scattered, Expiratory wheezes  L Breath Sounds: Scattered, Coarse  Cough: Congested, Non-productive  O2 Device: NC    Vitals:  Blood pressure 149/77, pulse 85, temperature (!) 97.2 °F (36.2 °C), resp. rate (P) 18, height 5' 8\" (1.727 m), weight 59.9 kg (132 lb), SpO2 95%, not currently breastfeeding.          Imaging and other studies: I have personally reviewed pertinent reports.      O2 Device: NC     Plan    Respiratory Plan: Home Bronchodilator Patient pathway        Resp Comments: Pt resting comfortably and in no apparent distress.  Offers no complaints.  Will cont w/ current therapy.   "

## 2024-02-25 NOTE — ASSESSMENT & PLAN NOTE
Lab Results   Component Value Date    EGFR 48 02/25/2024    EGFR 44 02/24/2024    EGFR 44 02/23/2024    CREATININE 1.11 02/25/2024    CREATININE 1.18 02/24/2024    CREATININE 1.18 02/23/2024     Pt has a history of CKD stage 2  Transitioned to po lasix 20 mg daily,   Cont to monitor bmp

## 2024-02-25 NOTE — ASSESSMENT & PLAN NOTE
Currently SpO2: 92 % on Nasal Cannula O2 Flow Rate (L/min): 6 L/min    At baseline, uses 4L NC  CT Imaging showing mild pulm edema, scattered areas of tree-in-bud opacity  Continue to treat underlying NSTEMI, COVID, mild pulm edema  2/23 repeat chest xray Unchanged appearance of mild interstitial edema.Right basilar opacity likely representing small effusion with atelectasis. Please note pneumonia may demonstrate a similar appearance the appropriate clinical setting.  2/26 CXR was generally unchanged from 2/23 CXR. Continue treatment for pneumonia vs COPD exacerbation vs atelectasis. Pt continues to complain of pleuritic right lower chest pain.   Pt still has cough, but sob has improved. Continue prednisone taper.  Procalcitonin 0.24, IV ceftriaxone. Procalcitonin downtrending, likely transition to po Augmentin tomorrow   Oxygen wean down to baseline 4L NC

## 2024-02-25 NOTE — PROGRESS NOTES
[facility]  Progress Note  Name: Lauren Quintero I  MRN: [id number]  Unit/Bed#: -01 I Date of Admission: 2/15/2024   Date of Service: 2/25/2024 I Hospital Day: 10    Assessment/Plan   CKD (chronic kidney disease)  Assessment & Plan  Lab Results   Component Value Date    EGFR 48 02/25/2024    EGFR 44 02/24/2024    EGFR 44 02/23/2024    CREATININE 1.11 02/25/2024    CREATININE 1.18 02/24/2024    CREATININE 1.18 02/23/2024     Pt has a history of CKD stage 2  Transitioned to po lasix 20 mg daily,   Cont to monitor bmp    COVID-19  Assessment & Plan  Lab Results   Component Value Date    SARSCOV2 Positive (A) 02/15/2024    SARSCOV2 Negative 12/12/2023    SARSCOV2 Negative 10/28/2023       Symptoms of COVID-19 SYMPTOMS: shortness of breath and Tachycardia (more than 100 beats per min)  Vaccine status: vaccinated  The patient does have acute hypoxemic respiratory failure given the same    CT chest w contrast showing Scattered areas of tree-in-bud opacity consistent with endobronchial impaction/infection  S/p remdesevir, IV steroids  Continue baricitinib given worsening oxygen requirement on 2/16  Oxygen requirements back to baseline, cough improving, afebrile  Had episode of hypoxia overnight, but resolved after  given neb treatments.  Continue to monitor  Patient likely will need rehab    COPD with exacerbation (HCC)  Assessment & Plan  Worsening Sob, wheezing, increased o2 requirements as above.   scheduled nebulizer treatment  Transition to po prednisone tomorrow    Major depressive disorder, recurrent episode, moderate (HCC)  Assessment & Plan  Continue wellbutrin, vilazodone  Also takes Vraylar; recommend bringing from home as we do not have it on formulary - patient now has it     Acute on chronic respiratory failure with hypoxia (HCC)  Assessment & Plan  Currently SpO2: 95 % on Nasal Cannula O2 Flow Rate (L/min): 4 L/min    At baseline, uses 4L NC  CT Imaging showing mild pulm edema,  scattered areas of tree-in-bud opacity  Continue to treat underlying NSTEMI, COVID, mild pulm edema  2/23 repeat chest xray Unchanged appearance of mild interstitial edema.Right basilar opacity likely representing small effusion with atelectasis. Please note pneumonia may demonstrate a similar appearance the appropriate clinical setting.  Pt still has cough, but sob has improved. Switch Solu-Medrol to prednisone.  Procalcitonin 0.33, IV ceftriaxone. recheck procalcitonin, plan to complete 3 doses  Oxygen wean down to baseline 4L NC      Essential hypertension  Assessment & Plan  Continue home meds; initiate lisinopril as part of NSTEMI management  Continue carvedilol  PRN IV Labetalol for SBP > 160  Resume lasix 20 mg po daily  Continue to monitor blood pressure          * NSTEMI (non-ST elevated myocardial infarction) (HCC)  Assessment & Plan  Lab Results   Component Value Date    HSTNI0 2,584 (H) 02/17/2024    HSTNI2 2,049 (H) 02/17/2024    HSTNID2 -535 02/17/2024      CT chest showing no evidence of pulmonary embolism, did note mild pulmonary edema  No chest pain present, but significant SOB, anginal equivalent, sudden onset, fairly elevated troponins  EKG: Sinus Tachycardia with no obvious STEMI  Suspect a true type I NSTEMI  ADRIEN Score: 5  The patient's presentation with shortness of breath was somewhat sudden according to the son, he was driving her home from the rehab facility if she got acutely short of breath and hypoxemic again.  Certainly it is possible that the COVID infection may have triggered some degree of underlying CAD leading to an NSTEMI.  Can stop heparin drip  Cardiology following.  Cardiac cath showed significant triple vessel disease with 90% stenosis in the mid LAD, 80% stenosis in the proximal LAD, 85% stenosis in the mid Cx, and 70% in the RCA with a patent mid RCA stent. Cardiology recommends maximizing medical therapy for her CAD and follow-up outpatient with a CT surgeon for a potential  CABG once she fully recovers from COVID.  Given new reduced EF of 35%, Lifevest has been fitted today  Patient is ready for rehab tomorrow                   VTE Pharmacologic Prophylaxis: VTE Score: 7 High Risk (Score >/= 5) - Pharmacological DVT Prophylaxis Ordered: heparin. Sequential Compression Devices Ordered.    Mobility:   Basic Mobility Inpatient Raw Score: 18  JH-HLM Goal: 6: Walk 10 steps or more  JH-HLM Achieved: 1: Laying in bed  HLM Goal NOT achieved. Continue with multidisciplinary rounding and encourage appropriate mobility to improve upon HLM goals.    Patient Centered Rounds: I performed bedside rounds with nursing staff today.   Discussions with Specialists or Other Care Team Provider: cardiology    Education and Discussions with Family / Patient: Attempted to update  (son) via phone. Left voicemail.     Total Time Spent on Date of Encounter in care of patient: 30 mins. This time was spent on one or more of the following: performing physical exam; counseling and coordination of care; obtaining or reviewing history; documenting in the medical record; reviewing/ordering tests, medications or procedures; communicating with other healthcare professionals and discussing with patient's family/caregivers.    Current Length of Stay: 10 day(s)  Current Patient Status: Inpatient   Certification Statement: The patient will continue to require additional inpatient hospital stay due to IV antibiotics   Discharge Plan: Anticipate discharge in 24-48 hrs to rehab facility.    Code Status: Level 3 - DNAR and DNI    Subjective:   Patient still complained of cough and feels weak. SOB has improved.    Objective:     Vitals:   Temp (24hrs), Av.6 °F (36.4 °C), Min:97.2 °F (36.2 °C), Max:97.8 °F (36.6 °C)    Temp:  [97.2 °F (36.2 °C)-97.8 °F (36.6 °C)] 97.3 °F (36.3 °C)  HR:  [79-91] 89  Resp:  [18] 18  BP: (118-149)/(56-77) 118/66  SpO2:  [93 %-95 %] 95 %  Body mass index is 20.07 kg/m².     Input and  Output Summary (last 24 hours):     Intake/Output Summary (Last 24 hours) at 2/25/2024 1430  Last data filed at 2/24/2024 1851  Gross per 24 hour   Intake --   Output 600 ml   Net -600 ml       Physical Exam:   Physical Exam  Constitutional:       General: She is not in acute distress.     Appearance: She is well-developed. She is not diaphoretic.   HENT:      Head: Normocephalic and atraumatic.      Nose: Nose normal.      Mouth/Throat:      Pharynx: No oropharyngeal exudate.   Eyes:      General: No scleral icterus.        Right eye: No discharge.         Left eye: No discharge.      Conjunctiva/sclera: Conjunctivae normal.   Cardiovascular:      Rate and Rhythm: Normal rate and regular rhythm.      Heart sounds: Normal heart sounds. No murmur heard.     No friction rub. No gallop.   Pulmonary:      Effort: Pulmonary effort is normal. No respiratory distress.      Breath sounds: Rhonchi present. No wheezing or rales.      Comments: 4L NC  Abdominal:      General: Bowel sounds are normal. There is no distension.      Palpations: Abdomen is soft.      Tenderness: There is no abdominal tenderness. There is no guarding.   Musculoskeletal:         General: Normal range of motion.      Cervical back: Normal range of motion and neck supple.      Right lower leg: No edema.      Left lower leg: No edema.   Skin:     General: Skin is warm and dry.      Findings: No erythema.   Neurological:      Mental Status: She is oriented to person, place, and time.   Psychiatric:         Behavior: Behavior normal.          Additional Data:     Labs:  Results from last 7 days   Lab Units 02/25/24  0524 02/21/24  0556 02/20/24  0622   WBC Thousand/uL 13.88*   < > 4.08*   HEMOGLOBIN g/dL 11.8   < > 11.5   HEMATOCRIT % 36.8   < > 35.5   PLATELETS Thousands/uL 226   < > 188   NEUTROS PCT %  --   --  79*   LYMPHS PCT %  --   --  15   MONOS PCT %  --   --  5   EOS PCT %  --   --  0    < > = values in this interval not displayed.     Results  from last 7 days   Lab Units 02/25/24  0524   SODIUM mmol/L 140   POTASSIUM mmol/L 3.6   CHLORIDE mmol/L 100   CO2 mmol/L 32   BUN mg/dL 41*   CREATININE mg/dL 1.11   ANION GAP mmol/L 8   CALCIUM mg/dL 9.6   GLUCOSE RANDOM mg/dL 122         Results from last 7 days   Lab Units 02/20/24  1628   POC GLUCOSE mg/dl 91         Results from last 7 days   Lab Units 02/25/24  0524   PROCALCITONIN ng/ml 0.30*       Lines/Drains:  Invasive Devices       Peripheral Intravenous Line  Duration             Peripheral IV 02/23/24 Right;Ventral (anterior) Forearm 1 day                      Telemetry:  Telemetry Orders (From admission, onward)               24 Hour Telemetry Monitoring  Continuous x 24 Hours (Telem)        Question:  Reason for 24 Hour Telemetry  Answer:  PCI/EP study (including pacer and ICD implementation), Cardiac surgery, MI, abnormal cardiac cath, and chest pain- rule out MI                     Telemetry Reviewed: Normal Sinus Rhythm  Indication for Continued Telemetry Use: Lifevest (remains on tele entire hospital stay)             Imaging: No pertinent imaging reviewed.    Recent Cultures (last 7 days):   Results from last 7 days   Lab Units 02/20/24  1616   BLOOD CULTURE  No Growth After 4 Days.  No Growth After 4 Days.       Last 24 Hours Medication List:   Current Facility-Administered Medications   Medication Dose Route Frequency Provider Last Rate    acetaminophen  975 mg Oral Q8H PRN Vasu Ramos PA-C      albuterol  2 puff Inhalation Q4H PRN Vasu Ramos PA-C      albuterol  2.5 mg Nebulization Q4H PRN Vasu Ramos PA-C      aluminum-magnesium hydroxide-simethicone  30 mL Oral Q6H PRN Vasu Ramos PA-C      aspirin  81 mg Oral Daily Vasu Ramos PA-C      baricitinib  2 mg Oral Q24H Vasu Ramos PA-C      benzonatate  100 mg Oral TID Alcides Rutherford MD      budesonide-formoterol  2 puff Inhalation BID Vasu Ramos PA-C      buPROPion   75 mg Oral Daily Vasu Ramos PA-C      carvedilol  25 mg Oral BID With Meals Vasu Ramos PA-C      cefTRIAXone  1,000 mg Intravenous Q24H Latia Dominique Saraiya, DO 1,000 mg (02/25/24 0822)    docusate sodium  100 mg Oral BID Vasu Ramos PA-C      [START ON 2/26/2024] furosemide  20 mg Oral Daily Alcides Rutherford MD      guaiFENesin  1,200 mg Oral Q12H ROSELINE Latia Dominique Saraiya, DO      ipratropium  0.5 mg Nebulization BID Vasu Ramos PA-C      labetalol  10 mg Intravenous Q6H PRN Vasu Ramos PA-C      levalbuterol  1.25 mg Nebulization BID Vasu Ramos PA-C      lidocaine  2 patch Topical Daily PRN Vasu Ramos PA-C      LORazepam  0.5 mg Oral BID PRN Latia Dominique Saraiya, DO      melatonin  3 mg Oral HS PRN Vasu Ramos PA-C      midodrine  2.5 mg Oral TID AC Vasu Ramos PA-C      nitroglycerin  0.4 mg Sublingual Q5 Min PRN Vasu Ramos PA-C      ondansetron  4 mg Intravenous Q6H PRN Vasu Ramos PA-C      pantoprazole  40 mg Oral Daily Vasu Ramos PA-C      pravastatin  80 mg Oral QPM Vasu Ramos PA-C      [START ON 2/26/2024] predniSONE  40 mg Oral Daily Alcides Rutherford MD      vilazodone  40 mg Oral Daily With Breakfast Vasu Ramos PA-C      Zanubrutinib  160 mg Oral BID Vasu Ramos PA-C          Today, Patient Was Seen By: Alcides Rutherford MD    **Please Note: This note may have been constructed using a voice recognition system.**

## 2024-02-25 NOTE — ASSESSMENT & PLAN NOTE
Lab Results   Component Value Date    SARSCOV2 Positive (A) 02/15/2024    SARSCOV2 Negative 12/12/2023    SARSCOV2 Negative 10/28/2023       Symptoms of COVID-19 SYMPTOMS: shortness of breath and Tachycardia (more than 100 beats per min)  Vaccine status: vaccinated  The patient does have acute hypoxemic respiratory failure given the same    CT chest w contrast showing Scattered areas of tree-in-bud opacity consistent with endobronchial impaction/infection  S/p remdesevir, IV steroids  Continue baricitinib given worsening oxygen requirement on 2/16  Oxygen requirements back to baseline, cough improving, afebrile  Had episode of hypoxia overnight, but resolved after  given neb treatments.  Continue to monitor  Patient likely will need rehab

## 2024-02-25 NOTE — ASSESSMENT & PLAN NOTE
Lab Results   Component Value Date    SARSCOV2 Positive (A) 02/15/2024    SARSCOV2 Negative 12/12/2023    SARSCOV2 Negative 10/28/2023       Symptoms of COVID-19 SYMPTOMS: shortness of breath and Tachycardia (more than 100 beats per min)  Vaccine status: vaccinated  The patient does have acute hypoxemic respiratory failure given the same    CT chest w contrast showing Scattered areas of tree-in-bud opacity consistent with endobronchial impaction/infection  S/p remdesevir, IV steroids  Continue baricitinib given worsening oxygen requirement on 2/16  Oxygen requirements back to baseline, cough improving, afebrile  Continue to monitor

## 2024-02-25 NOTE — ASSESSMENT & PLAN NOTE
Worsening Sob, wheezing, increased o2 requirements as above.   scheduled nebulizer treatment  Continue with po prednisone taper

## 2024-02-26 ENCOUNTER — APPOINTMENT (INPATIENT)
Dept: RADIOLOGY | Facility: HOSPITAL | Age: 79
DRG: 871 | End: 2024-02-26
Payer: MEDICARE

## 2024-02-26 DIAGNOSIS — J44.1 COPD WITH EXACERBATION (HCC): Primary | ICD-10-CM

## 2024-02-26 LAB
ANION GAP SERPL CALCULATED.3IONS-SCNC: 7 MMOL/L
BASOPHILS # BLD AUTO: 0.04 THOUSANDS/ÂΜL (ref 0–0.1)
BASOPHILS NFR BLD AUTO: 0 % (ref 0–1)
BUN SERPL-MCNC: 41 MG/DL (ref 5–25)
CALCIUM SERPL-MCNC: 9.4 MG/DL (ref 8.4–10.2)
CHLORIDE SERPL-SCNC: 101 MMOL/L (ref 96–108)
CO2 SERPL-SCNC: 32 MMOL/L (ref 21–32)
CREAT SERPL-MCNC: 1.05 MG/DL (ref 0.6–1.3)
EOSINOPHIL # BLD AUTO: 0 THOUSAND/ÂΜL (ref 0–0.61)
EOSINOPHIL NFR BLD AUTO: 0 % (ref 0–6)
ERYTHROCYTE [DISTWIDTH] IN BLOOD BY AUTOMATED COUNT: 12.7 % (ref 11.6–15.1)
GFR SERPL CREATININE-BSD FRML MDRD: 51 ML/MIN/1.73SQ M
GLUCOSE SERPL-MCNC: 120 MG/DL (ref 65–140)
HCT VFR BLD AUTO: 36.4 % (ref 34.8–46.1)
HGB BLD-MCNC: 11.6 G/DL (ref 11.5–15.4)
IMM GRANULOCYTES # BLD AUTO: 0.09 THOUSAND/UL (ref 0–0.2)
IMM GRANULOCYTES NFR BLD AUTO: 1 % (ref 0–2)
LYMPHOCYTES # BLD AUTO: 0.99 THOUSANDS/ÂΜL (ref 0.6–4.47)
LYMPHOCYTES NFR BLD AUTO: 6 % (ref 14–44)
MCH RBC QN AUTO: 30.1 PG (ref 26.8–34.3)
MCHC RBC AUTO-ENTMCNC: 31.9 G/DL (ref 31.4–37.4)
MCV RBC AUTO: 94 FL (ref 82–98)
MONOCYTES # BLD AUTO: 1.76 THOUSAND/ÂΜL (ref 0.17–1.22)
MONOCYTES NFR BLD AUTO: 11 % (ref 4–12)
NEUTROPHILS # BLD AUTO: 13.71 THOUSANDS/ÂΜL (ref 1.85–7.62)
NEUTS SEG NFR BLD AUTO: 82 % (ref 43–75)
NRBC BLD AUTO-RTO: 0 /100 WBCS
PLATELET # BLD AUTO: 200 THOUSANDS/UL (ref 149–390)
PMV BLD AUTO: 12.6 FL (ref 8.9–12.7)
POTASSIUM SERPL-SCNC: 3.4 MMOL/L (ref 3.5–5.3)
PROCALCITONIN SERPL-MCNC: 0.24 NG/ML
RBC # BLD AUTO: 3.86 MILLION/UL (ref 3.81–5.12)
SODIUM SERPL-SCNC: 140 MMOL/L (ref 135–147)
WBC # BLD AUTO: 16.59 THOUSAND/UL (ref 4.31–10.16)

## 2024-02-26 PROCEDURE — 74018 RADEX ABDOMEN 1 VIEW: CPT

## 2024-02-26 PROCEDURE — 94640 AIRWAY INHALATION TREATMENT: CPT

## 2024-02-26 PROCEDURE — 94760 N-INVAS EAR/PLS OXIMETRY 1: CPT

## 2024-02-26 PROCEDURE — 80048 BASIC METABOLIC PNL TOTAL CA: CPT | Performed by: INTERNAL MEDICINE

## 2024-02-26 PROCEDURE — 71045 X-RAY EXAM CHEST 1 VIEW: CPT

## 2024-02-26 PROCEDURE — 85025 COMPLETE CBC W/AUTO DIFF WBC: CPT | Performed by: INTERNAL MEDICINE

## 2024-02-26 PROCEDURE — 94664 DEMO&/EVAL PT USE INHALER: CPT

## 2024-02-26 PROCEDURE — 84145 PROCALCITONIN (PCT): CPT | Performed by: INTERNAL MEDICINE

## 2024-02-26 PROCEDURE — 99232 SBSQ HOSP IP/OBS MODERATE 35: CPT | Performed by: STUDENT IN AN ORGANIZED HEALTH CARE EDUCATION/TRAINING PROGRAM

## 2024-02-26 RX ORDER — ALBUTEROL SULFATE 2.5 MG/3ML
2.5 SOLUTION RESPIRATORY (INHALATION) ONCE
Status: COMPLETED | OUTPATIENT
Start: 2024-02-26 | End: 2024-02-26

## 2024-02-26 RX ORDER — POTASSIUM CHLORIDE 20 MEQ/1
40 TABLET, EXTENDED RELEASE ORAL ONCE
Status: COMPLETED | OUTPATIENT
Start: 2024-02-26 | End: 2024-02-26

## 2024-02-26 RX ORDER — METHOCARBAMOL 500 MG/1
500 TABLET, FILM COATED ORAL ONCE
Status: COMPLETED | OUTPATIENT
Start: 2024-02-26 | End: 2024-02-26

## 2024-02-26 RX ADMIN — ZANUBRUTINIB 160 MG: 80 CAPSULE, GELATIN COATED ORAL at 09:05

## 2024-02-26 RX ADMIN — ALBUTEROL SULFATE 2.5 MG: 2.5 SOLUTION RESPIRATORY (INHALATION) at 06:34

## 2024-02-26 RX ADMIN — GUAIFENESIN 1200 MG: 600 TABLET ORAL at 21:14

## 2024-02-26 RX ADMIN — BENZONATATE 100 MG: 100 CAPSULE ORAL at 21:14

## 2024-02-26 RX ADMIN — BENZONATATE 100 MG: 100 CAPSULE ORAL at 17:45

## 2024-02-26 RX ADMIN — METHOCARBAMOL TABLETS 500 MG: 500 TABLET, COATED ORAL at 10:10

## 2024-02-26 RX ADMIN — MORPHINE SULFATE 2 MG: 2 INJECTION, SOLUTION INTRAMUSCULAR; INTRAVENOUS at 06:12

## 2024-02-26 RX ADMIN — LEVALBUTEROL HYDROCHLORIDE 1.25 MG: 1.25 SOLUTION RESPIRATORY (INHALATION) at 20:44

## 2024-02-26 RX ADMIN — DOCUSATE SODIUM 100 MG: 100 CAPSULE, LIQUID FILLED ORAL at 17:45

## 2024-02-26 RX ADMIN — IPRATROPIUM BROMIDE 0.5 MG: 0.5 SOLUTION RESPIRATORY (INHALATION) at 20:44

## 2024-02-26 RX ADMIN — FUROSEMIDE 20 MG: 20 TABLET ORAL at 08:49

## 2024-02-26 RX ADMIN — POTASSIUM CHLORIDE 40 MEQ: 1500 TABLET, EXTENDED RELEASE ORAL at 16:03

## 2024-02-26 RX ADMIN — BENZONATATE 100 MG: 100 CAPSULE ORAL at 08:51

## 2024-02-26 RX ADMIN — HEPARIN SODIUM 5000 UNITS: 5000 INJECTION INTRAVENOUS; SUBCUTANEOUS at 21:13

## 2024-02-26 RX ADMIN — PREDNISONE 40 MG: 20 TABLET ORAL at 08:50

## 2024-02-26 RX ADMIN — BUDESONIDE AND FORMOTEROL FUMARATE DIHYDRATE 2 PUFF: 160; 4.5 AEROSOL RESPIRATORY (INHALATION) at 09:10

## 2024-02-26 RX ADMIN — CARVEDILOL 25 MG: 12.5 TABLET, FILM COATED ORAL at 17:45

## 2024-02-26 RX ADMIN — ALBUTEROL SULFATE 2.5 MG: 2.5 SOLUTION RESPIRATORY (INHALATION) at 16:17

## 2024-02-26 RX ADMIN — BUPROPION HYDROCHLORIDE 75 MG: 75 TABLET, FILM COATED ORAL at 08:50

## 2024-02-26 RX ADMIN — HEPARIN SODIUM 5000 UNITS: 5000 INJECTION INTRAVENOUS; SUBCUTANEOUS at 16:02

## 2024-02-26 RX ADMIN — MIDODRINE HYDROCHLORIDE 2.5 MG: 5 TABLET ORAL at 17:45

## 2024-02-26 RX ADMIN — ASPIRIN 81 MG: 81 TABLET, COATED ORAL at 08:50

## 2024-02-26 RX ADMIN — ZANUBRUTINIB 160 MG: 80 CAPSULE, GELATIN COATED ORAL at 17:46

## 2024-02-26 RX ADMIN — HEPARIN SODIUM 5000 UNITS: 5000 INJECTION INTRAVENOUS; SUBCUTANEOUS at 08:59

## 2024-02-26 RX ADMIN — ACETAMINOPHEN 975 MG: 325 TABLET, FILM COATED ORAL at 12:48

## 2024-02-26 RX ADMIN — PRAVASTATIN SODIUM 80 MG: 80 TABLET ORAL at 17:45

## 2024-02-26 RX ADMIN — CARVEDILOL 25 MG: 12.5 TABLET, FILM COATED ORAL at 08:50

## 2024-02-26 RX ADMIN — BUDESONIDE AND FORMOTEROL FUMARATE DIHYDRATE 2 PUFF: 160; 4.5 AEROSOL RESPIRATORY (INHALATION) at 17:45

## 2024-02-26 RX ADMIN — CEFTRIAXONE 1000 MG: 1 INJECTION, SOLUTION INTRAVENOUS at 08:44

## 2024-02-26 RX ADMIN — DOCUSATE SODIUM 100 MG: 100 CAPSULE, LIQUID FILLED ORAL at 08:50

## 2024-02-26 RX ADMIN — GUAIFENESIN 1200 MG: 600 TABLET ORAL at 08:50

## 2024-02-26 RX ADMIN — MIDODRINE HYDROCHLORIDE 2.5 MG: 5 TABLET ORAL at 09:18

## 2024-02-26 RX ADMIN — PANTOPRAZOLE SODIUM 40 MG: 40 TABLET, DELAYED RELEASE ORAL at 08:50

## 2024-02-26 RX ADMIN — BARICITINIB 2 MG: 2 TABLET, FILM COATED ORAL at 09:03

## 2024-02-26 RX ADMIN — VILAZODONE HYDROCHLORIDE 40 MG: 20 TABLET ORAL at 09:00

## 2024-02-26 RX ADMIN — MIDODRINE HYDROCHLORIDE 2.5 MG: 5 TABLET ORAL at 12:29

## 2024-02-26 NOTE — PROGRESS NOTES
[facility]  Progress Note  Name: Lauren Quintero I  MRN: [id number]  Unit/Bed#: -01 I Date of Admission: 2/15/2024   Date of Service: 2/26/2024 I Hospital Day: 11    Assessment/Plan   CKD (chronic kidney disease)  Assessment & Plan  Lab Results   Component Value Date    EGFR 48 02/25/2024    EGFR 44 02/24/2024    EGFR 44 02/23/2024    CREATININE 1.11 02/25/2024    CREATININE 1.18 02/24/2024    CREATININE 1.18 02/23/2024     Pt has a history of CKD stage 2  Transitioned to po lasix 20 mg daily,   Cont to monitor bmp    COVID-19  Assessment & Plan  Lab Results   Component Value Date    SARSCOV2 Positive (A) 02/15/2024    SARSCOV2 Negative 12/12/2023    SARSCOV2 Negative 10/28/2023       Symptoms of COVID-19 SYMPTOMS: shortness of breath and Tachycardia (more than 100 beats per min)  Vaccine status: vaccinated  The patient does have acute hypoxemic respiratory failure given the same    CT chest w contrast showing Scattered areas of tree-in-bud opacity consistent with endobronchial impaction/infection  S/p remdesevir, IV steroids  Continue baricitinib given worsening oxygen requirement on 2/16  Oxygen requirements back to baseline, cough improving, afebrile  Continue to monitor    COPD with exacerbation (HCC)  Assessment & Plan  Worsening Sob, wheezing, increased o2 requirements as above.   scheduled nebulizer treatment  Continue with po prednisone taper    Major depressive disorder, recurrent episode, moderate (HCC)  Assessment & Plan  Continue wellbutrin, vilazodone  Also takes Vraylar; recommend bringing from home as we do not have it on formulary - patient now has it     Acute on chronic respiratory failure with hypoxia (HCC)  Assessment & Plan  Currently SpO2: 92 % on Nasal Cannula O2 Flow Rate (L/min): 6 L/min    At baseline, uses 4L NC  CT Imaging showing mild pulm edema, scattered areas of tree-in-bud opacity  Continue to treat underlying NSTEMI, COVID, mild pulm edema  2/23 repeat  chest xray Unchanged appearance of mild interstitial edema.Right basilar opacity likely representing small effusion with atelectasis. Please note pneumonia may demonstrate a similar appearance the appropriate clinical setting.  2/26 CXR was generally unchanged from 2/23 CXR. Continue treatment for pneumonia vs COPD exacerbation vs atelectasis. Pt continues to complain of pleuritic right lower chest pain.   Pt still has cough, but sob has improved. Continue prednisone taper.  Procalcitonin 0.24, IV ceftriaxone. Procalcitonin downtrending, likely transition to po Augmentin tomorrow   Oxygen wean down to baseline 4L NC      Essential hypertension  Assessment & Plan  Continue home meds; initiate lisinopril as part of NSTEMI management  Continue carvedilol  PRN IV Labetalol for SBP > 160  Resume lasix 20 mg po daily  Continue to monitor blood pressure          * NSTEMI (non-ST elevated myocardial infarction) (HCC)  Assessment & Plan  Lab Results   Component Value Date    HSTNI0 2,584 (H) 02/17/2024    HSTNI2 2,049 (H) 02/17/2024    HSTNID2 -535 02/17/2024      CT chest showing no evidence of pulmonary embolism, did note mild pulmonary edema  No chest pain present, but significant SOB, anginal equivalent, sudden onset, fairly elevated troponins  EKG: Sinus Tachycardia with no obvious STEMI  Suspect a true type I NSTEMI  ADRIEN Score: 5  The patient's presentation with shortness of breath was somewhat sudden according to the son, he was driving her home from the rehab facility if she got acutely short of breath and hypoxemic again.  Certainly it is possible that the COVID infection may have triggered some degree of underlying CAD leading to an NSTEMI.  Can stop heparin drip  Cardiology following.  Cardiac cath showed significant triple vessel disease with 90% stenosis in the mid LAD, 80% stenosis in the proximal LAD, 85% stenosis in the mid Cx, and 70% in the RCA with a patent mid RCA stent. Cardiology recommends maximizing  medical therapy for her CAD and follow-up outpatient with a CT surgeon for a potential CABG once she fully recovers from COVID.  Given new reduced EF of 35%, Lifevest has been fitted today  Patient is ready for rehab tomorrow                   VTE Pharmacologic Prophylaxis: VTE Score: 7 High Risk (Score >/= 5) - Pharmacological DVT Prophylaxis Ordered: heparin. Sequential Compression Devices Ordered.    Mobility:   Basic Mobility Inpatient Raw Score: 18  JH-HLM Goal: 6: Walk 10 steps or more  JH-HLM Achieved: 1: Laying in bed  HLM Goal NOT achieved. Continue with multidisciplinary rounding and encourage appropriate mobility to improve upon HLM goals.    Patient Centered Rounds: I performed bedside rounds with nursing staff today.  Discussions with Specialists or Other Care Team Provider:  IP CONSULT TO CARDIOLOGY      Education and Discussions with Family / Patient:     Current Length of Stay: 11 day(s)  Current Patient Status: Inpatient   Certification Statement: The patient will continue to require additional inpatient hospital stay due to plan as noted above  Discharge Plan: Anticipate discharge in 24-48 hrs to rehab facility.    Code Status: Level 3 - DNAR and DNI    Subjective:   Pt is resting comfortably in bed. Overnight she complained of right lower chest pain and required increasing oxygen requirements to 6L. Her chest pain is pleuritic in nature but also slightly tender to palpation. Her cough has improved and denies any fever. Denies any SOB, abdominal pain. All questions answered, likely good to go to rehab tomorrow.     Objective:     Vitals:   Temp (24hrs), Av.5 °F (36.9 °C), Min:97.5 °F (36.4 °C), Max:99.2 °F (37.3 °C)    Temp:  [97.5 °F (36.4 °C)-99.2 °F (37.3 °C)] 98.9 °F (37.2 °C)  HR:  [] 96  Resp:  [18-26] 19  BP: (137-176)/(54-89) 148/54  SpO2:  [89 %-95 %] 93 %  Body mass index is 20.07 kg/m².     Input and Output Summary (last 24 hours):     Intake/Output Summary (Last 24 hours) at  2/26/2024 1340  Last data filed at 2/25/2024 2153  Gross per 24 hour   Intake --   Output 600 ml   Net -600 ml       Physical Exam:   Physical Exam  Constitutional:       Appearance: Normal appearance.   HENT:      Head: Normocephalic and atraumatic.      Right Ear: External ear normal.      Left Ear: External ear normal.      Nose: Nose normal.      Mouth/Throat:      Mouth: Mucous membranes are moist.      Pharynx: Oropharynx is clear.   Eyes:      Extraocular Movements: Extraocular movements intact.      Conjunctiva/sclera: Conjunctivae normal.   Cardiovascular:      Rate and Rhythm: Normal rate and regular rhythm.      Pulses: Normal pulses.      Heart sounds: Normal heart sounds.   Pulmonary:      Effort: Pulmonary effort is normal.      Breath sounds: Rhonchi present.   Chest:      Chest wall: Tenderness present.   Abdominal:      General: Abdomen is flat. Bowel sounds are normal.      Palpations: Abdomen is soft.      Tenderness: There is no abdominal tenderness.   Musculoskeletal:         General: Normal range of motion.   Skin:     General: Skin is warm and dry.      Capillary Refill: Capillary refill takes less than 2 seconds.   Neurological:      General: No focal deficit present.      Mental Status: She is alert and oriented to person, place, and time.            Additional Data:     Labs:  Results from last 7 days   Lab Units 02/26/24  0438   WBC Thousand/uL 16.59*   HEMOGLOBIN g/dL 11.6   HEMATOCRIT % 36.4   PLATELETS Thousands/uL 200   NEUTROS PCT % 82*   LYMPHS PCT % 6*   MONOS PCT % 11   EOS PCT % 0     Results from last 7 days   Lab Units 02/26/24  0438   SODIUM mmol/L 140   POTASSIUM mmol/L 3.4*   CHLORIDE mmol/L 101   CO2 mmol/L 32   BUN mg/dL 41*   CREATININE mg/dL 1.05   ANION GAP mmol/L 7   CALCIUM mg/dL 9.4   GLUCOSE RANDOM mg/dL 120         Results from last 7 days   Lab Units 02/20/24  1628   POC GLUCOSE mg/dl 91         Results from last 7 days   Lab Units 02/26/24  0438 02/25/24  0561    PROCALCITONIN ng/ml 0.24 0.30*       Lines/Drains:  Invasive Devices       Peripheral Intravenous Line  Duration             Peripheral IV 02/23/24 Right;Ventral (anterior) Forearm 2 days                      Telemetry:  Telemetry Orders (From admission, onward)               24 Hour Telemetry Monitoring  Continuous x 24 Hours (Telem)        Question:  Reason for 24 Hour Telemetry  Answer:  Arrhythmias requiring acute medical intervention / PPM or ICD malfunction                     Telemetry Reviewed: Normal Sinus Rhythm  Indication for Continued Telemetry Use: Acute MI/Unstable Angina/Rule out ACS             Imaging: Reviewed radiology reports from this admission including:   XR chest 1 view portable    Result Date: 2/16/2024  Impression: Mild pulmonary interstitial edema. Workstation performed: [id number]     CT chest with contrast    Result Date: 2/15/2024  Impression: No evidence for pulmonary embolism. Mild pulmonary edema. Scattered areas of tree-in-bud opacity consistent with endobronchial impaction/infection. Workstation performed: [id number]       No Chest XR results available for this patient.     Results for orders placed during the hospital encounter of 02/15/24    Echo complete w/ contrast if indicated    Interpretation Summary    Left Ventricle: Left ventricular cavity size is normal. Wall thickness is mildly increased. There is mild asymmetric hypertrophy of the basal septal wall. The left ventricular ejection fraction is 35%. Systolic function is severely reduced. Diastolic function is mildly abnormal, consistent with grade I (abnormal) relaxation.    The following segments are hypokinetic: mid anteroseptal, mid inferoseptal, apical anterior, apical septal, apical inferior and apex.    Mitral Valve: There is severe annular calcification.    Tricuspid Valve: There is mild regurgitation.    IVC/SVC: The inferior vena cava is dilated.    Pulmonary Artery: The estimated pulmonary artery systolic  pressure is 40.0 mmHg. The pulmonary artery systolic pressure is mildly increased.      Recent Cultures (last 7 days):   Results from last 7 days   Lab Units 02/20/24  1616   BLOOD CULTURE  No Growth After 5 Days.  No Growth After 5 Days.       Last 24 Hours Medication List:   Current Facility-Administered Medications   Medication Dose Route Frequency Provider Last Rate    acetaminophen  975 mg Oral Q8H PRN Vasu Ramos PA-C      albuterol  2 puff Inhalation Q4H PRN Vasu Ramos PA-C      albuterol  2.5 mg Nebulization Q4H PRN Vasu Ramos PA-C      aluminum-magnesium hydroxide-simethicone  30 mL Oral Q6H PRN Vasu Ramos PA-C      aspirin  81 mg Oral Daily Vasu Ramos PA-C      baricitinib  2 mg Oral Q24H Vasu Ramos PA-C      benzonatate  100 mg Oral TID Alcides Rutherford MD      budesonide-formoterol  2 puff Inhalation BID Vasu Ramos PA-C      buPROPion  75 mg Oral Daily Vasu Ramos PA-C      carvedilol  25 mg Oral BID With Meals Vasu Ramos PA-C      cefTRIAXone  1,000 mg Intravenous Q24H Latia Dominique Saraiya, DO 1,000 mg (02/26/24 0844)    docusate sodium  100 mg Oral BID Vasu Ramos PA-C      furosemide  20 mg Oral Daily Alcides Rutherford MD      guaiFENesin  1,200 mg Oral Q12H [facility] Latia Dominique Saraiya, DO      heparin (porcine)  5,000 Units Subcutaneous Q8H [facility] Alcides Rutherford MD      ipratropium  0.5 mg Nebulization BID Vasu Ramos PA-C      labetalol  10 mg Intravenous Q6H PRN Vasu Ramos PA-C      levalbuterol  1.25 mg Nebulization BID Vasu Ramos PA-C      lidocaine  2 patch Topical Daily PRN Vasu Ramos PA-C      LORazepam  0.5 mg Oral BID PRN Latia Dominique Saraiya, DO      melatonin  3 mg Oral HS PRN Vasu Ramos PA-C      midodrine  2.5 mg Oral TID AC Vasu Ramos PA-C      nitroglycerin  0.4 mg Sublingual Q5 Min PRN Vasu Ramos PA-C       ondansetron  4 mg Intravenous Q6H PRN Vasu Ramos PA-C      pantoprazole  40 mg Oral Daily Vasu Ramos PA-C      potassium chloride  40 mEq Oral Once Latia Cortes DO      pravastatin  80 mg Oral QPM Vasu Ramos PA-C      predniSONE  40 mg Oral Daily Alcides Rutherford MD      vilazodone  40 mg Oral Daily With Breakfast Vasu Ramos PA-C      Zanubrutinib  160 mg Oral BID Vasu Ramos PA-C          Today, Patient Was Seen By: Latia Cortes DO and the attending physician, Latia Cortes DO        **Please Note: This note may have been constructed using a voice recognition system.**

## 2024-02-26 NOTE — CASE MANAGEMENT
Case Management Progress Note    Patient name Lauren Quintero  Location /-01 MRN [id number]  : 1945 Date 2024       LOS (days): 11  Geometric Mean LOS (GMLOS) (days): 5.1  Days to GMLOS:-5.6        OBJECTIVE:    Current admission status: Inpatient  Preferred Pharmacy:   [id number] [place] - [address]  [address]  [place] [address]  Phone: [phone] Fax: [phone]    [facility] - [place] - [address]  [address]  [address]  [place] [address]  Phone: [phone] Fax: [phone]    Primary Care Provider: Starla Bateman MD  Primary Insurance: MEDICARE  Secondary Insurance:     PROGRESS NOTE:  Patient reviewed during Interdisciplinary Rounds with [facility] today.  D/C held today due to increased O2 requirements.  Patient on 6L.  CM updated [facility] via [facility].  Will anticipate d/c to facility tomorrow.  CM will continue to follow.      Due to exceptional admission status, [facility] d/c note needs to include that patient will require less than 30 calendar days of NF placement and that behaviors are stable.      IMM Given (Date):: 24  IMM Given to:: Patient  Additional Comments: Verbal review of IMM via phone due to Covid. Patient reports understanding, questions answered. Original to medical records.    UPDATE @ [facility] - OP CM referral sent via [facility] Care Coordination line ([phone]).  Patient's URR score is 50.  PCP is Dr. Bateman in [place].

## 2024-02-26 NOTE — PLAN OF CARE
Problem: Potential for Falls  Goal: Patient will remain free of falls  Description: INTERVENTIONS:  - Educate patient/family on patient safety including physical limitations  - Instruct patient to call for assistance with activity   - Consult OT/PT to assist with strengthening/mobility   - Keep Call bell within reach  - Keep bed low and locked with side rails adjusted as appropriate  - Keep care items and personal belongings within reach  - Initiate and maintain comfort rounds  - Make Fall Risk Sign visible to staff  - Offer Toileting every 2 Hours, in advance of need  - Initiate/Maintain bed and chair alarm  - Obtain necessary fall risk management equipment: non skid socks  - Apply yellow socks and bracelet for high fall risk patients  - Consider moving patient to room near nurses station  Outcome: Progressing     Problem: RESPIRATORY - ADULT  Goal: Achieves optimal ventilation and oxygenation  Description: INTERVENTIONS:  - Assess for changes in respiratory status  - Assess for changes in mentation and behavior  - Position to facilitate oxygenation and minimize respiratory effort  - Oxygen administered by appropriate delivery if ordered  - Initiate smoking cessation education as indicated  - Encourage broncho-pulmonary hygiene including cough, deep breathe, Incentive Spirometry  - Assess the need for suctioning and aspirate as needed  - Assess and instruct to report SOB or any respiratory difficulty  - Respiratory Therapy support as indicated  Outcome: Progressing     Problem: Prexisting or High Potential for Compromised Skin Integrity  Goal: Skin integrity is maintained or improved  Description: INTERVENTIONS:  - Identify patients at risk for skin breakdown  - Assess and monitor skin integrity  - Assess and monitor nutrition and hydration status  - Monitor labs   - Assess for incontinence   - Turn and reposition patient  - Assist with mobility/ambulation  - Relieve pressure over bony prominences  - Avoid  friction and shearing  - Provide appropriate hygiene as needed including keeping skin clean and dry  - Evaluate need for skin moisturizer/barrier cream  - Collaborate with interdisciplinary team   - Patient/family teaching  - Consider wound care consult   Outcome: Progressing     Problem: PAIN - ADULT  Goal: Verbalizes/displays adequate comfort level or baseline comfort level  Description: Interventions:  - Encourage patient to monitor pain and request assistance  - Assess pain using appropriate pain scale  - Administer analgesics based on type and severity of pain and evaluate response  - Implement non-pharmacological measures as appropriate and evaluate response  - Consider cultural and social influences on pain and pain management  - Notify physician/advanced practitioner if interventions unsuccessful or patient reports new pain  Outcome: Progressing     Problem: INFECTION - ADULT  Goal: Absence or prevention of progression during hospitalization  Description: INTERVENTIONS:  - Assess and monitor for signs and symptoms of infection  - Monitor lab/diagnostic results  - Monitor all insertion sites, i.e. indwelling lines, tubes, and drains  - Monitor endotracheal if appropriate and nasal secretions for changes in amount and color  - [place] appropriate cooling/warming therapies per order  - Administer medications as ordered  - Instruct and encourage patient and family to use good hand hygiene technique  - Identify and instruct in appropriate isolation precautions for identified infection/condition  Outcome: Progressing  Goal: Absence of fever/infection during neutropenic period  Description: INTERVENTIONS:  - Monitor WBC    Outcome: Progressing     Problem: SAFETY ADULT  Goal: Patient will remain free of falls  Description: INTERVENTIONS:  - Educate patient/family on patient safety including physical limitations  - Instruct patient to call for assistance with activity   - Consult OT/PT to assist with  strengthening/mobility   - Keep Call bell within reach  - Keep bed low and locked with side rails adjusted as appropriate  - Keep care items and personal belongings within reach  - Initiate and maintain comfort rounds  - Make Fall Risk Sign visible to staff  - Offer Toileting every 2 Hours, in advance of need  - Initiate/Maintain bed and chair alarm  - Obtain necessary fall risk management equipment: non skid socks  - Apply yellow socks and bracelet for high fall risk patients  - Consider moving patient to room near nurses station  Outcome: Progressing  Goal: Maintain or return to baseline ADL function  Description: INTERVENTIONS:  -  Assess patient's ability to carry out ADLs; assess patient's baseline for ADL function and identify physical deficits which impact ability to perform ADLs (bathing, care of mouth/teeth, toileting, grooming, dressing, etc.)  - Assess/evaluate cause of self-care deficits   - Assess range of motion  - Assess patient's mobility; develop plan if impaired  - Assess patient's need for assistive devices and provide as appropriate  - Encourage maximum independence but intervene and supervise when necessary  - Involve family in performance of ADLs  - Assess for home care needs following discharge   - Consider OT consult to assist with ADL evaluation and planning for discharge  - Provide patient education as appropriate  Outcome: Progressing  Goal: Maintains/Returns to pre admission functional level  Description: INTERVENTIONS:  - Perform AM-PAC 6 Click Basic Mobility/ Daily Activity assessment daily.  - Set and communicate daily mobility goal to care team and patient/family/caregiver.   - Collaborate with rehabilitation services on mobility goals if consulted  - Perform Range of Motion 4 times a day.  - Reposition patient every 2 hours.  - Dangle patient 4 times a day  - Stand patient 3 times a day  - Ambulate patient 3 times a day  - Out of bed to chair 3 times a day   - Out of bed for meals 3  times a day  - Out of bed for toileting  - Record patient progress and toleration of activity level   Outcome: Progressing     Problem: DISCHARGE PLANNING  Goal: Discharge to home or other facility with appropriate resources  Description: INTERVENTIONS:  - Identify barriers to discharge w/patient and caregiver  - Arrange for needed discharge resources and transportation as appropriate  - Identify discharge learning needs (meds, wound care, etc.)  - Arrange for interpretive services to assist at discharge as needed  - Refer to Case Management Department for coordinating discharge planning if the patient needs post-hospital services based on physician/advanced practitioner order or complex needs related to functional status, cognitive ability, or social support system  Outcome: Progressing     Problem: Knowledge Deficit  Goal: Patient/family/caregiver demonstrates understanding of disease process, treatment plan, medications, and discharge instructions  Description: Complete learning assessment and assess knowledge base.  Interventions:  - Provide teaching at level of understanding  - Provide teaching via preferred learning methods  Outcome: Progressing     Problem: MOBILITY - ADULT  Goal: Maintain or return to baseline ADL function  Description: INTERVENTIONS:  -  Assess patient's ability to carry out ADLs; assess patient's baseline for ADL function and identify physical deficits which impact ability to perform ADLs (bathing, care of mouth/teeth, toileting, grooming, dressing, etc.)  - Assess/evaluate cause of self-care deficits   - Assess range of motion  - Assess patient's mobility; develop plan if impaired  - Assess patient's need for assistive devices and provide as appropriate  - Encourage maximum independence but intervene and supervise when necessary  - Involve family in performance of ADLs  - Assess for home care needs following discharge   - Consider OT consult to assist with ADL evaluation and planning for  discharge  - Provide patient education as appropriate  Outcome: Progressing  Goal: Maintains/Returns to pre admission functional level  Description: INTERVENTIONS:  - Perform AM-PAC 6 Click Basic Mobility/ Daily Activity assessment daily.  - Set and communicate daily mobility goal to care team and patient/family/caregiver.   - Collaborate with rehabilitation services on mobility goals if consulted  - Perform Range of Motion 4 times a day.  - Reposition patient every 2 hours.  - Dangle patient 4 times a day  - Stand patient 3 times a day  - Ambulate patient 3 times a day  - Out of bed to chair 3 times a day   - Out of bed for meals 3 times a day  - Out of bed for toileting  - Record patient progress and toleration of activity level   Outcome: Progressing

## 2024-02-26 NOTE — NURSING NOTE
Pt complained around 0130 of pain from her right neck down her right side. I administered to her Tylenol PRN 975mg and a lidocaine patch. She stated her pain went down to a 3. She then called me again around 0530 saying her pain has come back but now was under her right breast. I texted [facility] and they put in an order for morphine 2 mg. I had Loni Hamm RN administer the medication and upon assessing her she found her lungs sounded diminished in her right lower lobe. Her O2 Sats were 89% on 4L. [facility] came up to assess her along with Respiratory. They ordered a chest XRAY STAT and a nebulizer treatment. [facility] cleared her for the administration of the morphine 2mg for her pain. Pt is now stable at 6L of O2 NC.

## 2024-02-26 NOTE — RESPIRATORY THERAPY NOTE
"RT Protocol Note  Lauren Quintero 78 y.o. female MRN: [id number]  Unit/Bed#: -01 Encounter: [id number]    Assessment    Principal Problem:    NSTEMI (non-ST elevated myocardial infarction) ([facility])  Active Problems:    Essential hypertension    Acute on chronic respiratory failure with hypoxia (HCC)    Major depressive disorder, recurrent episode, moderate (HCC)    COPD with exacerbation ([facility])    COVID-19    CKD (chronic kidney disease)  Subjective Data: awake and alert    Objective    Physical Exam:   Assessment Type: Assess only  General Appearance: Sleeping  Respiratory Pattern: Normal  Chest Assessment: Chest expansion symmetrical  Bilateral Breath Sounds: Diminished, Coarse  O2 Device: nc    Vitals:  Blood pressure 148/54, pulse 96, temperature 98.9 °F (37.2 °C), temperature source Oral, resp. rate 19, height 5' 8\" (1.727 m), weight 59.9 kg (132 lb), SpO2 93%, not currently breastfeeding.  O2 Device: nc     Plan    Respiratory Plan: Home Bronchodilator Patient pathway        Resp Comments: will cont with BID txs at this time, BBS diminished coarse/ spo2 92%   "

## 2024-02-26 NOTE — PLAN OF CARE
Problem: Potential for Falls  Goal: Patient will remain free of falls  Description: INTERVENTIONS:  - Educate patient/family on patient safety including physical limitations  - Instruct patient to call for assistance with activity   - Consult OT/PT to assist with strengthening/mobility   - Keep Call bell within reach  - Keep bed low and locked with side rails adjusted as appropriate  - Keep care items and personal belongings within reach  - Initiate and maintain comfort rounds  - Make Fall Risk Sign visible to staff  - Offer Toileting every 2 Hours, in advance of need  - Initiate/Maintain bed alarm  - Obtain necessary fall risk management equipment  - Apply yellow socks and bracelet for high fall risk patients  - Consider moving patient to room near nurses station  Outcome: Progressing     Problem: RESPIRATORY - ADULT  Goal: Achieves optimal ventilation and oxygenation  Description: INTERVENTIONS:  - Assess for changes in respiratory status  - Assess for changes in mentation and behavior  - Position to facilitate oxygenation and minimize respiratory effort  - Oxygen administered by appropriate delivery if ordered  - Initiate smoking cessation education as indicated  - Encourage broncho-pulmonary hygiene including cough, deep breathe, Incentive Spirometry  - Assess the need for suctioning and aspirate as needed  - Assess and instruct to report SOB or any respiratory difficulty  - Respiratory Therapy support as indicated  Outcome: Progressing     Problem: Prexisting or High Potential for Compromised Skin Integrity  Goal: Skin integrity is maintained or improved  Description: INTERVENTIONS:  - Identify patients at risk for skin breakdown  - Assess and monitor skin integrity  - Assess and monitor nutrition and hydration status  - Monitor labs   - Assess for incontinence   - Turn and reposition patient  - Assist with mobility/ambulation  - Relieve pressure over bony prominences  - Avoid friction and shearing  - Provide  appropriate hygiene as needed including keeping skin clean and dry  - Evaluate need for skin moisturizer/barrier cream  - Collaborate with interdisciplinary team   - Patient/family teaching  - Consider wound care consult   Outcome: Progressing     Problem: PAIN - ADULT  Goal: Verbalizes/displays adequate comfort level or baseline comfort level  Description: Interventions:  - Encourage patient to monitor pain and request assistance  - Assess pain using appropriate pain scale  - Administer analgesics based on type and severity of pain and evaluate response  - Implement non-pharmacological measures as appropriate and evaluate response  - Consider cultural and social influences on pain and pain management  - Notify physician/advanced practitioner if interventions unsuccessful or patient reports new pain  Outcome: Progressing     Problem: INFECTION - ADULT  Goal: Absence or prevention of progression during hospitalization  Description: INTERVENTIONS:  - Assess and monitor for signs and symptoms of infection  - Monitor lab/diagnostic results  - Monitor all insertion sites, i.e. indwelling lines, tubes, and drains  - Monitor endotracheal if appropriate and nasal secretions for changes in amount and color  - [place] appropriate cooling/warming therapies per order  - Administer medications as ordered  - Instruct and encourage patient and family to use good hand hygiene technique  - Identify and instruct in appropriate isolation precautions for identified infection/condition  Outcome: Progressing  Goal: Absence of fever/infection during neutropenic period  Description: INTERVENTIONS:  - Monitor WBC    Outcome: Progressing     Problem: SAFETY ADULT  Goal: Patient will remain free of falls  Description: INTERVENTIONS:  - Educate patient/family on patient safety including physical limitations  - Instruct patient to call for assistance with activity   - Consult OT/PT to assist with strengthening/mobility   - Keep Call bell  within reach  - Keep bed low and locked with side rails adjusted as appropriate  - Keep care items and personal belongings within reach  - Initiate and maintain comfort rounds  - Make Fall Risk Sign visible to staff  - Offer Toileting every 2 Hours, in advance of need  - Initiate/Maintain bed alarm  - Obtain necessary fall risk management equipment  - Apply yellow socks and bracelet for high fall risk patients  - Consider moving patient to room near nurses station  Outcome: Progressing  Goal: Maintain or return to baseline ADL function  Description: INTERVENTIONS:  -  Assess patient's ability to carry out ADLs; assess patient's baseline for ADL function and identify physical deficits which impact ability to perform ADLs (bathing, care of mouth/teeth, toileting, grooming, dressing, etc.)  - Assess/evaluate cause of self-care deficits   - Assess range of motion  - Assess patient's mobility; develop plan if impaired  - Assess patient's need for assistive devices and provide as appropriate  - Encourage maximum independence but intervene and supervise when necessary  - Involve family in performance of ADLs  - Assess for home care needs following discharge   - Consider OT consult to assist with ADL evaluation and planning for discharge  - Provide patient education as appropriate  Outcome: Progressing  Goal: Maintains/Returns to pre admission functional level  Description: INTERVENTIONS:  - Perform AM-PAC 6 Click Basic Mobility/ Daily Activity assessment daily.  - Set and communicate daily mobility goal to care team and patient/family/caregiver.   - Collaborate with rehabilitation services on mobility goals if consulted  - Perform Range of Motion 3 times a day.  - Reposition patient every 2 hours.  - Dangle patient 3 times a day  - Stand patient 3 times a day  - Ambulate patient 3 times a day  - Out of bed to chair 3 times a day   - Out of bed for meals 3 times a day  - Out of bed for toileting  - Record patient progress  and toleration of activity level   Outcome: Progressing     Problem: DISCHARGE PLANNING  Goal: Discharge to home or other facility with appropriate resources  Description: INTERVENTIONS:  - Identify barriers to discharge w/patient and caregiver  - Arrange for needed discharge resources and transportation as appropriate  - Identify discharge learning needs (meds, wound care, etc.)  - Arrange for interpretive services to assist at discharge as needed  - Refer to Case Management Department for coordinating discharge planning if the patient needs post-hospital services based on physician/advanced practitioner order or complex needs related to functional status, cognitive ability, or social support system  Outcome: Progressing     Problem: Knowledge Deficit  Goal: Patient/family/caregiver demonstrates understanding of disease process, treatment plan, medications, and discharge instructions  Description: Complete learning assessment and assess knowledge base.  Interventions:  - Provide teaching at level of understanding  - Provide teaching via preferred learning methods  Outcome: Progressing     Problem: MOBILITY - ADULT  Goal: Maintain or return to baseline ADL function  Description: INTERVENTIONS:  -  Assess patient's ability to carry out ADLs; assess patient's baseline for ADL function and identify physical deficits which impact ability to perform ADLs (bathing, care of mouth/teeth, toileting, grooming, dressing, etc.)  - Assess/evaluate cause of self-care deficits   - Assess range of motion  - Assess patient's mobility; develop plan if impaired  - Assess patient's need for assistive devices and provide as appropriate  - Encourage maximum independence but intervene and supervise when necessary  - Involve family in performance of ADLs  - Assess for home care needs following discharge   - Consider OT consult to assist with ADL evaluation and planning for discharge  - Provide patient education as appropriate  Outcome:  Progressing  Goal: Maintains/Returns to pre admission functional level  Description: INTERVENTIONS:  - Perform AM-PAC 6 Click Basic Mobility/ Daily Activity assessment daily.  - Set and communicate daily mobility goal to care team and patient/family/caregiver.   - Collaborate with rehabilitation services on mobility goals if consulted  - Perform Range of Motion 3 times a day.  - Reposition patient every 2 hours.  - Dangle patient 3 times a day  - Stand patient 3 times a day  - Ambulate patient 3 times a day  - Out of bed to chair 3 times a day   - Out of bed for meals 3 times a day  - Out of bed for toileting  - Record patient progress and toleration of activity level   Outcome: Progressing

## 2024-02-27 ENCOUNTER — APPOINTMENT (OUTPATIENT)
Dept: NON INVASIVE DIAGNOSTICS | Facility: HOSPITAL | Age: 79
DRG: 871 | End: 2024-02-27
Attending: INTERNAL MEDICINE
Payer: MEDICARE

## 2024-02-27 ENCOUNTER — APPOINTMENT (INPATIENT)
Dept: ULTRASOUND IMAGING | Facility: HOSPITAL | Age: 79
DRG: 871 | End: 2024-02-27
Payer: MEDICARE

## 2024-02-27 LAB
ALBUMIN SERPL BCP-MCNC: 3.4 G/DL (ref 3.5–5)
ALP SERPL-CCNC: 63 U/L (ref 34–104)
ALT SERPL W P-5'-P-CCNC: 5 U/L (ref 7–52)
ANION GAP SERPL CALCULATED.3IONS-SCNC: 8 MMOL/L
AST SERPL W P-5'-P-CCNC: 8 U/L (ref 13–39)
BILIRUB DIRECT SERPL-MCNC: 0.04 MG/DL (ref 0–0.2)
BILIRUB SERPL-MCNC: 0.37 MG/DL (ref 0.2–1)
BUN SERPL-MCNC: 35 MG/DL (ref 5–25)
CALCIUM SERPL-MCNC: 9 MG/DL (ref 8.4–10.2)
CHLORIDE SERPL-SCNC: 103 MMOL/L (ref 96–108)
CO2 SERPL-SCNC: 29 MMOL/L (ref 21–32)
CREAT SERPL-MCNC: 0.82 MG/DL (ref 0.6–1.3)
ERYTHROCYTE [DISTWIDTH] IN BLOOD BY AUTOMATED COUNT: 12.9 % (ref 11.6–15.1)
GFR SERPL CREATININE-BSD FRML MDRD: 69 ML/MIN/1.73SQ M
GLUCOSE SERPL-MCNC: 122 MG/DL (ref 65–140)
HCT VFR BLD AUTO: 35.4 % (ref 34.8–46.1)
HGB BLD-MCNC: 11.6 G/DL (ref 11.5–15.4)
MCH RBC QN AUTO: 30.6 PG (ref 26.8–34.3)
MCHC RBC AUTO-ENTMCNC: 32.8 G/DL (ref 31.4–37.4)
MCV RBC AUTO: 93 FL (ref 82–98)
PLATELET # BLD AUTO: 219 THOUSANDS/UL (ref 149–390)
PMV BLD AUTO: 11.5 FL (ref 8.9–12.7)
POTASSIUM SERPL-SCNC: 4.3 MMOL/L (ref 3.5–5.3)
PROT SERPL-MCNC: 6.4 G/DL (ref 6.4–8.4)
RBC # BLD AUTO: 3.79 MILLION/UL (ref 3.81–5.12)
SODIUM SERPL-SCNC: 140 MMOL/L (ref 135–147)
WBC # BLD AUTO: 20.69 THOUSAND/UL (ref 4.31–10.16)

## 2024-02-27 PROCEDURE — 94664 DEMO&/EVAL PT USE INHALER: CPT

## 2024-02-27 PROCEDURE — 80048 BASIC METABOLIC PNL TOTAL CA: CPT | Performed by: STUDENT IN AN ORGANIZED HEALTH CARE EDUCATION/TRAINING PROGRAM

## 2024-02-27 PROCEDURE — 99233 SBSQ HOSP IP/OBS HIGH 50: CPT | Performed by: INTERNAL MEDICINE

## 2024-02-27 PROCEDURE — 94760 N-INVAS EAR/PLS OXIMETRY 1: CPT

## 2024-02-27 PROCEDURE — 94640 AIRWAY INHALATION TREATMENT: CPT

## 2024-02-27 PROCEDURE — 80076 HEPATIC FUNCTION PANEL: CPT | Performed by: INTERNAL MEDICINE

## 2024-02-27 PROCEDURE — 76604 US EXAM CHEST: CPT | Performed by: NURSE PRACTITIONER

## 2024-02-27 PROCEDURE — 85027 COMPLETE CBC AUTOMATED: CPT | Performed by: STUDENT IN AN ORGANIZED HEALTH CARE EDUCATION/TRAINING PROGRAM

## 2024-02-27 RX ADMIN — Medication 3 MG: at 03:00

## 2024-02-27 RX ADMIN — ACETAMINOPHEN 975 MG: 325 TABLET, FILM COATED ORAL at 03:00

## 2024-02-27 RX ADMIN — BUDESONIDE AND FORMOTEROL FUMARATE DIHYDRATE 2 PUFF: 160; 4.5 AEROSOL RESPIRATORY (INHALATION) at 09:27

## 2024-02-27 RX ADMIN — LEVALBUTEROL HYDROCHLORIDE 1.25 MG: 1.25 SOLUTION RESPIRATORY (INHALATION) at 08:01

## 2024-02-27 RX ADMIN — GUAIFENESIN 1200 MG: 600 TABLET ORAL at 22:14

## 2024-02-27 RX ADMIN — CEFTRIAXONE 1000 MG: 1 INJECTION, SOLUTION INTRAVENOUS at 07:45

## 2024-02-27 RX ADMIN — MIDODRINE HYDROCHLORIDE 2.5 MG: 5 TABLET ORAL at 07:49

## 2024-02-27 RX ADMIN — ALBUTEROL SULFATE 2.5 MG: 2.5 SOLUTION RESPIRATORY (INHALATION) at 15:56

## 2024-02-27 RX ADMIN — MIDODRINE HYDROCHLORIDE 2.5 MG: 5 TABLET ORAL at 11:11

## 2024-02-27 RX ADMIN — GUAIFENESIN 1200 MG: 600 TABLET ORAL at 09:28

## 2024-02-27 RX ADMIN — ZANUBRUTINIB 160 MG: 80 CAPSULE, GELATIN COATED ORAL at 17:42

## 2024-02-27 RX ADMIN — VILAZODONE HYDROCHLORIDE 40 MG: 20 TABLET ORAL at 07:51

## 2024-02-27 RX ADMIN — LIDOCAINE 5% 2 PATCH: 700 PATCH TOPICAL at 09:23

## 2024-02-27 RX ADMIN — HEPARIN SODIUM 5000 UNITS: 5000 INJECTION INTRAVENOUS; SUBCUTANEOUS at 22:15

## 2024-02-27 RX ADMIN — DOCUSATE SODIUM 100 MG: 100 CAPSULE, LIQUID FILLED ORAL at 09:29

## 2024-02-27 RX ADMIN — ACETAMINOPHEN 975 MG: 325 TABLET, FILM COATED ORAL at 11:11

## 2024-02-27 RX ADMIN — BENZONATATE 100 MG: 100 CAPSULE ORAL at 22:14

## 2024-02-27 RX ADMIN — PRAVASTATIN SODIUM 80 MG: 80 TABLET ORAL at 17:42

## 2024-02-27 RX ADMIN — MORPHINE SULFATE 2 MG: 2 INJECTION, SOLUTION INTRAMUSCULAR; INTRAVENOUS at 04:02

## 2024-02-27 RX ADMIN — HEPARIN SODIUM 5000 UNITS: 5000 INJECTION INTRAVENOUS; SUBCUTANEOUS at 14:00

## 2024-02-27 RX ADMIN — CARVEDILOL 25 MG: 12.5 TABLET, FILM COATED ORAL at 17:42

## 2024-02-27 RX ADMIN — PREDNISONE 40 MG: 20 TABLET ORAL at 09:29

## 2024-02-27 RX ADMIN — ASPIRIN 81 MG: 81 TABLET, COATED ORAL at 09:29

## 2024-02-27 RX ADMIN — BARICITINIB 2 MG: 2 TABLET, FILM COATED ORAL at 09:29

## 2024-02-27 RX ADMIN — IPRATROPIUM BROMIDE 0.5 MG: 0.5 SOLUTION RESPIRATORY (INHALATION) at 08:01

## 2024-02-27 RX ADMIN — FUROSEMIDE 20 MG: 20 TABLET ORAL at 09:29

## 2024-02-27 RX ADMIN — DOCUSATE SODIUM 100 MG: 100 CAPSULE, LIQUID FILLED ORAL at 17:42

## 2024-02-27 RX ADMIN — IPRATROPIUM BROMIDE 0.5 MG: 0.5 SOLUTION RESPIRATORY (INHALATION) at 19:18

## 2024-02-27 RX ADMIN — BENZONATATE 100 MG: 100 CAPSULE ORAL at 09:29

## 2024-02-27 RX ADMIN — BENZONATATE 100 MG: 100 CAPSULE ORAL at 17:41

## 2024-02-27 RX ADMIN — LEVALBUTEROL HYDROCHLORIDE 1.25 MG: 1.25 SOLUTION RESPIRATORY (INHALATION) at 19:18

## 2024-02-27 RX ADMIN — HEPARIN SODIUM 5000 UNITS: 5000 INJECTION INTRAVENOUS; SUBCUTANEOUS at 05:00

## 2024-02-27 RX ADMIN — PANTOPRAZOLE SODIUM 40 MG: 40 TABLET, DELAYED RELEASE ORAL at 09:29

## 2024-02-27 RX ADMIN — BUPROPION HYDROCHLORIDE 75 MG: 75 TABLET, FILM COATED ORAL at 09:29

## 2024-02-27 RX ADMIN — MIDODRINE HYDROCHLORIDE 2.5 MG: 5 TABLET ORAL at 17:41

## 2024-02-27 RX ADMIN — ZANUBRUTINIB 160 MG: 80 CAPSULE, GELATIN COATED ORAL at 09:29

## 2024-02-27 RX ADMIN — BUDESONIDE AND FORMOTEROL FUMARATE DIHYDRATE 2 PUFF: 160; 4.5 AEROSOL RESPIRATORY (INHALATION) at 17:43

## 2024-02-27 RX ADMIN — CARVEDILOL 25 MG: 12.5 TABLET, FILM COATED ORAL at 07:49

## 2024-02-27 NOTE — ASSESSMENT & PLAN NOTE
Lab Results   Component Value Date    HSTNI0 2,584 (H) 02/17/2024    HSTNI2 2,049 (H) 02/17/2024    HSTNID2 -535 02/17/2024      CT chest showing no evidence of pulmonary embolism, did note mild pulmonary edema  No chest pain present, but significant SOB, anginal equivalent, sudden onset, fairly elevated troponins  EKG: Sinus Tachycardia with no obvious STEMI  Suspect a true type I NSTEMI  ADRIEN Score: 5  The patient's presentation with shortness of breath was somewhat sudden according to the son, he was driving her home from the rehab facility if she got acutely short of breath and hypoxemic again.  Certainly it is possible that the COVID infection may have triggered some degree of underlying CAD leading to an NSTEMI.  Can stop heparin drip  Cardiology following.  Cardiac cath showed significant triple vessel disease with 90% stenosis in the mid LAD, 80% stenosis in the proximal LAD, 85% stenosis in the mid Cx, and 70% in the RCA with a patent mid RCA stent. Cardiology recommends maximizing medical therapy for her CAD and follow-up outpatient with a CT surgeon for a potential CABG once she fully recovers from COVID.  Given new reduced EF of 35%, Lifevest has been fitted today  Patient is ready for rehab from cardiac standpoint

## 2024-02-27 NOTE — BRIEF OP NOTE (RAD/CATH)
IR THORACENTESIS Procedure Note- Procedure Not Performed    PATIENT NAME: Lauren Quintero  : 1945  MRN: [id number]    Pre-op Diagnosis:   1. Dyspnea    2. COVID-19    3. NSTEMI (non-ST elevated myocardial infarction) (HCC)    4. Chest pain    5. Elevated troponin    6. Abnormal EKG      Post-op Diagnosis:   1. Dyspnea    2. COVID-19    3. NSTEMI (non-ST elevated myocardial infarction) (HCC)    4. Chest pain    5. Elevated troponin    6. Abnormal EKG        Provider:   TAMMIE Mcgill  Assistants:     No qualified resident was available, Resident is only observing    Estimated Blood Loss: None     Findings: RIGHT thorax ultrasound imaging performed with no identifiable fluid to safely access and drain for thoracentesis. Procedure not performed.    Specimens: None    Complications:  None immediate    Anesthesia: none    TAMMIE Mcgill     Date: 2024  Time: 3:14 PM

## 2024-02-27 NOTE — RESPIRATORY THERAPY NOTE
02/26/24 2044   Respiratory Protocol   Protocol Initiated? No   Protocol Selection Respiratory   Language Barrier? No   Medical & Social History Reviewed? Yes   Diagnostic Studies Reviewed? Yes   Physical Assessment Performed? Yes   Home Devices/Therapy Home O2   Respiratory Plan No distress/Pulmonary history   Respiratory Assessment   General Appearance Awake;Alert   Respiratory Pattern Tachypneic;Normal   Chest Assessment Chest expansion symmetrical   Bilateral Breath Sounds Coarse   Cough None   Resp Comments respirations are even and non labored   O2 Device nasal cannula   Additional Assessments   SpO2 95 %

## 2024-02-27 NOTE — PLAN OF CARE
Problem: Potential for Falls  Goal: Patient will remain free of falls  Description: INTERVENTIONS:  - Educate patient/family on patient safety including physical limitations  - Instruct patient to call for assistance with activity   - Consult OT/PT to assist with strengthening/mobility   - Keep Call bell within reach  - Keep bed low and locked with side rails adjusted as appropriate  - Keep care items and personal belongings within reach  - Initiate and maintain comfort rounds  - Make Fall Risk Sign visible to staff  - Offer Toileting every  Hours, in advance of need  - Initiate/Maintain alarm  - Obtain necessary fall risk management equipment:   - Apply yellow socks and bracelet for high fall risk patients  - Consider moving patient to room near nurses station  Outcome: Progressing     Problem: RESPIRATORY - ADULT  Goal: Achieves optimal ventilation and oxygenation  Description: INTERVENTIONS:  - Assess for changes in respiratory status  - Assess for changes in mentation and behavior  - Position to facilitate oxygenation and minimize respiratory effort  - Oxygen administered by appropriate delivery if ordered  - Initiate smoking cessation education as indicated  - Encourage broncho-pulmonary hygiene including cough, deep breathe, Incentive Spirometry  - Assess the need for suctioning and aspirate as needed  - Assess and instruct to report SOB or any respiratory difficulty  - Respiratory Therapy support as indicated  Outcome: Progressing     Problem: Prexisting or High Potential for Compromised Skin Integrity  Goal: Skin integrity is maintained or improved  Description: INTERVENTIONS:  - Identify patients at risk for skin breakdown  - Assess and monitor skin integrity  - Assess and monitor nutrition and hydration status  - Monitor labs   - Assess for incontinence   - Turn and reposition patient  - Assist with mobility/ambulation  - Relieve pressure over bony prominences  - Avoid friction and shearing  - Provide  appropriate hygiene as needed including keeping skin clean and dry  - Evaluate need for skin moisturizer/barrier cream  - Collaborate with interdisciplinary team   - Patient/family teaching  - Consider wound care consult   Outcome: Progressing     Problem: PAIN - ADULT  Goal: Verbalizes/displays adequate comfort level or baseline comfort level  Description: Interventions:  - Encourage patient to monitor pain and request assistance  - Assess pain using appropriate pain scale  - Administer analgesics based on type and severity of pain and evaluate response  - Implement non-pharmacological measures as appropriate and evaluate response  - Consider cultural and social influences on pain and pain management  - Notify physician/advanced practitioner if interventions unsuccessful or patient reports new pain  Outcome: Progressing     Problem: INFECTION - ADULT  Goal: Absence or prevention of progression during hospitalization  Description: INTERVENTIONS:  - Assess and monitor for signs and symptoms of infection  - Monitor lab/diagnostic results  - Monitor all insertion sites, i.e. indwelling lines, tubes, and drains  - Monitor endotracheal if appropriate and nasal secretions for changes in amount and color  - [place] appropriate cooling/warming therapies per order  - Administer medications as ordered  - Instruct and encourage patient and family to use good hand hygiene technique  - Identify and instruct in appropriate isolation precautions for identified infection/condition  Outcome: Progressing  Goal: Absence of fever/infection during neutropenic period  Description: INTERVENTIONS:  - Monitor WBC    Outcome: Progressing     Problem: SAFETY ADULT  Goal: Patient will remain free of falls  Description: INTERVENTIONS:  - Educate patient/family on patient safety including physical limitations  - Instruct patient to call for assistance with activity   - Consult OT/PT to assist with strengthening/mobility   - Keep Call bell  within reach  - Keep bed low and locked with side rails adjusted as appropriate  - Keep care items and personal belongings within reach  - Initiate and maintain comfort rounds  - Make Fall Risk Sign visible to staff  - Offer Toileting every  Hours, in advance of need  - Initiate/Maintain alarm  - Obtain necessary fall risk management equipment:   - Apply yellow socks and bracelet for high fall risk patients  - Consider moving patient to room near nurses station  Outcome: Progressing  Goal: Maintain or return to baseline ADL function  Description: INTERVENTIONS:  -  Assess patient's ability to carry out ADLs; assess patient's baseline for ADL function and identify physical deficits which impact ability to perform ADLs (bathing, care of mouth/teeth, toileting, grooming, dressing, etc.)  - Assess/evaluate cause of self-care deficits   - Assess range of motion  - Assess patient's mobility; develop plan if impaired  - Assess patient's need for assistive devices and provide as appropriate  - Encourage maximum independence but intervene and supervise when necessary  - Involve family in performance of ADLs  - Assess for home care needs following discharge   - Consider OT consult to assist with ADL evaluation and planning for discharge  - Provide patient education as appropriate  Outcome: Progressing  Goal: Maintains/Returns to pre admission functional level  Description: INTERVENTIONS:  - Perform AM-PAC 6 Click Basic Mobility/ Daily Activity assessment daily.  - Set and communicate daily mobility goal to care team and patient/family/caregiver.   - Collaborate with rehabilitation services on mobility goals if consulted  - Perform Range of Motion  times a day.  - Reposition patient every  hours.  - Dangle patient  times a day  - Stand patient  times a day  - Ambulate patient  times a day  - Out of bed to chair  times a day   - Out of bed for meals times a day  - Out of bed for toileting  - Record patient progress and  toleration of activity level   Outcome: Progressing     Problem: DISCHARGE PLANNING  Goal: Discharge to home or other facility with appropriate resources  Description: INTERVENTIONS:  - Identify barriers to discharge w/patient and caregiver  - Arrange for needed discharge resources and transportation as appropriate  - Identify discharge learning needs (meds, wound care, etc.)  - Arrange for interpretive services to assist at discharge as needed  - Refer to Case Management Department for coordinating discharge planning if the patient needs post-hospital services based on physician/advanced practitioner order or complex needs related to functional status, cognitive ability, or social support system  Outcome: Progressing     Problem: Knowledge Deficit  Goal: Patient/family/caregiver demonstrates understanding of disease process, treatment plan, medications, and discharge instructions  Description: Complete learning assessment and assess knowledge base.  Interventions:  - Provide teaching at level of understanding  - Provide teaching via preferred learning methods  Outcome: Progressing     Problem: MOBILITY - ADULT  Goal: Maintain or return to baseline ADL function  Description: INTERVENTIONS:  -  Assess patient's ability to carry out ADLs; assess patient's baseline for ADL function and identify physical deficits which impact ability to perform ADLs (bathing, care of mouth/teeth, toileting, grooming, dressing, etc.)  - Assess/evaluate cause of self-care deficits   - Assess range of motion  - Assess patient's mobility; develop plan if impaired  - Assess patient's need for assistive devices and provide as appropriate  - Encourage maximum independence but intervene and supervise when necessary  - Involve family in performance of ADLs  - Assess for home care needs following discharge   - Consider OT consult to assist with ADL evaluation and planning for discharge  - Provide patient education as appropriate  Outcome:  Progressing  Goal: Maintains/Returns to pre admission functional level  Description: INTERVENTIONS:  - Perform AM-PAC 6 Click Basic Mobility/ Daily Activity assessment daily.  - Set and communicate daily mobility goal to care team and patient/family/caregiver.   - Collaborate with rehabilitation services on mobility goals if consulted  - Perform Range of Motion  times a day.  - Reposition patient every  hours.  - Dangle patient  times a day  - Stand patient  times a day  - Ambulate patient  times a day  - Out of bed to chair  times a day   - Out of bed for meals  times a day  - Out of bed for toileting  - Record patient progress and toleration of activity level   Outcome: Progressing

## 2024-02-27 NOTE — RESPIRATORY THERAPY NOTE
RT Protocol Note  Lauren Quintero 78 y.o. female MRN: [id number]  Unit/Bed#: -01 Encounter: [id number]    Assessment    Principal Problem:    NSTEMI (non-ST elevated myocardial infarction) ([facility])  Active Problems:    Essential hypertension    Acute on chronic respiratory failure with hypoxia ([facility])    Major depressive disorder, recurrent episode, moderate ([facility])    COPD with exacerbation ([facility])    COVID-19    CKD (chronic kidney disease)      Home Pulmonary Medications:  Inhalers/neb    Home Devices/Therapy: Home O2    Past Medical History:   Diagnosis Date    Acute congestive heart failure (HCC) 2021    Anxiety     Cardiac disease     Chest pain 2021    Chronic pain disorder     COPD (chronic obstructive pulmonary disease) ([facility])     Depression     Heart disease     Hyperlipidemia     Hypertension     MI (myocardial infarction) ([facility])     MRSA (methicillin resistant Staphylococcus aureus)     Renal disorder     benign kidney tumor     Social History     Socioeconomic History    Marital status:      Spouse name: None    Number of children: 3    Years of education: 13    Highest education level: High school graduate   Occupational History    Occupation:      Employer: [employer]     Comment: retired    Tobacco Use    Smoking status: Former     Current packs/day: 0.00     Average packs/day: 1 pack/day for 60.0 years (60.0 ttl pk-yrs)     Types: Cigarettes     Start date:      Quit date: 2016     Years since quittin.1    Smokeless tobacco: Never    Tobacco comments:     She has close to a 60 pack-year smoking history, most recently has cut down to 4-5 cigarettes daily   Vaping Use    Vaping status: Never Used   Substance and Sexual Activity    Alcohol use: Never    Drug use: No    Sexual activity: Not Currently   Other Topics Concern    None   Social History Narrative    None     Social Determinants of Health     Financial Resource Strain: Patient Declined  (1/6/2024)    Received from [facility]    Overall Financial Resource Strain (CARDIA)     Difficulty of Paying Living Expenses: Patient declined   Food Insecurity: No Food Insecurity (2/19/2024)    Hunger Vital Sign     Worried About Running Out of Food in the Last Year: Never true     Ran Out of Food in the Last Year: Never true   Transportation Needs: No Transportation Needs (2/19/2024)    PRAPARE - Transportation     Lack of Transportation (Medical): No     Lack of Transportation (Non-Medical): No   Physical Activity: Patient Declined (1/6/2024)    Received from [facility]    Exercise Vital Sign     Days of Exercise per Week: Patient declined     Minutes of Exercise per Session: Patient declined   Stress: Patient Declined (1/6/2024)    Received from [facility]    [place] [employer] - Occupational Stress Questionnaire     Feeling of Stress : Patient declined   Social Connections: Patient Declined (1/6/2024)    Received from [facility]    Social Connection and Isolation Panel [NHANES]     Frequency of Communication with Friends and Family: Patient declined     Frequency of Social Gatherings with Friends and Family: Patient declined     Attends Gnosticist Services: Patient declined     Active Member of Clubs or Organizations: Patient declined     Attends Club or Organization Meetings: Patient declined     Marital Status: Patient declined   Intimate Partner Violence: Patient Declined (1/6/2024)    Received from [facility]    Humiliation, Afraid, Rape, and Kick questionnaire     Fear of Current or Ex-Partner: Patient declined     Emotionally Abused: Patient declined     Physically Abused: Patient declined     Sexually Abused: Patient declined   Housing Stability: Low Risk  (2/19/2024)    Housing Stability Vital Sign     Unable to Pay for Housing in the Last Year: No     Number of Places Lived in the Last Year: 2     Unstable Housing in the Last Year: No  "      Subjective    Subjective Data: awake and alert    Objective    Physical Exam:   Assessment Type: During-treatment  General Appearance: Awake, Alert  Respiratory Pattern: Normal  Chest Assessment: Chest expansion symmetrical  Bilateral Breath Sounds: Diminished  Cough: None  O2 Device: NC    Vitals:  Blood pressure 112/61, pulse 95, temperature 98 °F (36.7 °C), temperature source Oral, resp. rate 22, height 5' 8\" (1.727 m), weight 59.9 kg (132 lb), SpO2 95%, not currently breastfeeding.          Imaging and other studies: I have personally reviewed pertinent reports.      O2 Device: NC     Plan    Respiratory Plan: No distress/Pulmonary history        Resp Comments: Pt resting in bed and in no apparent distress.  States she's having pain under right breast that travels up to her shoulder.  RN made aware.  Will cont w/ current therapy.   "

## 2024-02-27 NOTE — ASSESSMENT & PLAN NOTE
Lab Results   Component Value Date    EGFR 69 02/27/2024    EGFR 51 02/26/2024    EGFR 48 02/25/2024    CREATININE 0.82 02/27/2024    CREATININE 1.05 02/26/2024    CREATININE 1.11 02/25/2024     Pt has a history of CKD stage 2  Transitioned to po lasix 20 mg daily,   Cont to monitor bmp

## 2024-02-27 NOTE — PROGRESS NOTES
Formerly [facility], [facility]  Progress Note  Name: Lauren Quintero I  MRN: [id number]  Unit/Bed#: -01 I Date of Admission: 2/15/2024   Date of Service: 2/27/2024 I Hospital Day: 12    Assessment/Plan   CKD (chronic kidney disease)  Assessment & Plan  Lab Results   Component Value Date    EGFR 69 02/27/2024    EGFR 51 02/26/2024    EGFR 48 02/25/2024    CREATININE 0.82 02/27/2024    CREATININE 1.05 02/26/2024    CREATININE 1.11 02/25/2024     Pt has a history of CKD stage 2  Transitioned to po lasix 20 mg daily,   Cont to monitor bmp    COVID-19  Assessment & Plan  Lab Results   Component Value Date    SARSCOV2 Positive (A) 02/15/2024    SARSCOV2 Negative 12/12/2023    SARSCOV2 Negative 10/28/2023       Symptoms of COVID-19 SYMPTOMS: shortness of breath and Tachycardia (more than 100 beats per min)  Vaccine status: vaccinated  The patient does have acute hypoxemic respiratory failure given the same    CT chest w contrast showing Scattered areas of tree-in-bud opacity consistent with endobronchial impaction/infection  S/p remdesevir, IV steroids  Oxygen requirements are still a problem.    COPD with exacerbation (HCC)  Assessment & Plan  Worsening Sob, wheezing, increased o2 requirements as above.   scheduled nebulizer treatment  Continue with po prednisone taper    Major depressive disorder, recurrent episode, moderate (HCC)  Assessment & Plan  Continue wellbutrin, vilazodone  Also takes Vraylar; recommend bringing from home as we do not have it on formulary - patient now has it     Acute on chronic respiratory failure with hypoxia (HCC)  Assessment & Plan  Currently SpO2: 90 % on Nasal Cannula O2 Flow Rate (L/min): 5 L/min    At baseline, uses 4L NC  CT Imaging showing mild pulm edema, scattered areas of tree-in-bud opacity  Continue to treat underlying NSTEMI, COVID, mild pulm edema  2/23 repeat chest xray Unchanged appearance of mild interstitial edema.Right basilar opacity likely representing small  effusion with atelectasis. Please note pneumonia may demonstrate a similar appearance the appropriate clinical setting.  2/26 CXR was generally unchanged from 2/23 CXR. Continue treatment for pneumonia vs COPD exacerbation vs atelectasis. Pt continues to complain of pleuritic right lower chest pain.   Pt continues to complain of right upper abdominal/lower chest wall pain. On exam today found to have a palpable lower liver edge that was tender to palpation. Will order hepatic function tests as well as RUQ US to evaluate any hepatic source of her pain.  Pt still has cough, but sob has improved. Continue prednisone taper.  Procalcitonin 0.24, IV ceftriaxone. Procalcitonin downtrending, likely transition to po Augmentin tomorrow   Oxygen requirement still present, the patient is complaining of some pain with deep inspiration/right upper quadrant as well.  She does have an effusion there which we will consider for thoracentesis if possible.      Essential hypertension  Assessment & Plan  Continue home meds; initiate lisinopril as part of NSTEMI management  Continue carvedilol  PRN IV Labetalol for SBP > 160  Resume lasix 20 mg po daily  Continue to monitor blood pressure          * NSTEMI (non-ST elevated myocardial infarction) (HCC)  Assessment & Plan  Lab Results   Component Value Date    HSTNI0 2,584 (H) 02/17/2024    HSTNI2 2,049 (H) 02/17/2024    HSTNID2 -535 02/17/2024      CT chest showing no evidence of pulmonary embolism, did note mild pulmonary edema  No chest pain present, but significant SOB, anginal equivalent, sudden onset, fairly elevated troponins  EKG: Sinus Tachycardia with no obvious STEMI  Suspect a true type I NSTEMI  ADRIEN Score: 5  The patient's presentation with shortness of breath was somewhat sudden according to the son, he was driving her home from the rehab facility if she got acutely short of breath and hypoxemic again.  Certainly it is possible that the COVID infection may have triggered  some degree of underlying CAD leading to an NSTEMI.  Can stop heparin drip  Cardiology following.  Cardiac cath showed significant triple vessel disease with 90% stenosis in the mid LAD, 80% stenosis in the proximal LAD, 85% stenosis in the mid Cx, and 70% in the RCA with a patent mid RCA stent. Cardiology recommends maximizing medical therapy for her CAD and follow-up outpatient with a CT surgeon for a potential CABG once she fully recovers from COVID.  Given new reduced EF of 35%, Lifevest has been fitted today  Patient is ready for rehab from cardiac standpoint                   VTE Pharmacologic Prophylaxis: VTE Score: 7 High Risk (Score >/= 5) - Pharmacological DVT Prophylaxis Ordered: heparin. Sequential Compression Devices Ordered.    Mobility:   Basic Mobility Inpatient Raw Score: 18  JH-HLM Goal: 6: Walk 10 steps or more  JH-HLM Achieved: 1: Laying in bed  HLM Goal NOT achieved. Continue with multidisciplinary rounding and encourage appropriate mobility to improve upon HLM goals.    Patient Centered Rounds: I performed bedside rounds with nursing staff today.  Discussions with Specialists or Other Care Team Provider:  IP CONSULT TO CARDIOLOGY      Education and Discussions with Family / Patient:  Patient    Current Length of Stay: 12 day(s)  Current Patient Status: Inpatient   Certification Statement: The patient will continue to require additional inpatient hospital stay due to plan as noted above  Discharge Plan: Anticipate discharge in 24-48 hrs to rehab facility.    Code Status: Level 3 - DNAR and DNI    Subjective:     Pt is resting in bed. She still complains of RUQ/RLL pain, especially overnight. Her pain increases with breathing and palpation. She denies any CP, SOB, palpitations, fever. All questions answered.     Objective:     Vitals:   Temp (24hrs), Av.8 °F (36.6 °C), Min:97.7 °F (36.5 °C), Max:98 °F (36.7 °C)    Temp:  [97.7 °F (36.5 °C)-98 °F (36.7 °C)] 98 °F (36.7 °C)  HR:  [95-96]  95  Resp:  [19-22] 22  BP: (110-176)/(54-86) 110/56  SpO2:  [90 %-95 %] 90 %  Body mass index is 20.07 kg/m².     Input and Output Summary (last 24 hours):     Intake/Output Summary (Last 24 hours) at 2/27/2024 1058  Last data filed at 2/27/2024 0900  Gross per 24 hour   Intake 680 ml   Output 950 ml   Net -270 ml       Physical Exam:   Physical Exam  Constitutional:       Appearance: Normal appearance.      Comments: Increased work of breathing compared to previously, has to take intermittent pauses while speaking to catch breath   HENT:      Head: Normocephalic and atraumatic.      Right Ear: External ear normal.      Left Ear: External ear normal.      Nose: Nose normal.      Mouth/Throat:      Mouth: Mucous membranes are moist.      Pharynx: Oropharynx is clear.   Eyes:      Extraocular Movements: Extraocular movements intact.      Conjunctiva/sclera: Conjunctivae normal.   Cardiovascular:      Rate and Rhythm: Normal rate and regular rhythm.      Pulses: Normal pulses.      Heart sounds: Normal heart sounds.   Pulmonary:      Effort: Pulmonary effort is normal.      Breath sounds: Normal breath sounds. No wheezing.   Abdominal:      General: Abdomen is flat. Bowel sounds are normal.      Palpations: Abdomen is soft.      Tenderness: There is abdominal tenderness. There is no guarding or rebound.      Comments: RUQ tenderness to palpation, palpable liver edge   Musculoskeletal:      Right lower leg: No edema.      Left lower leg: No edema.   Skin:     General: Skin is warm and dry.      Capillary Refill: Capillary refill takes less than 2 seconds.      Coloration: Skin is not jaundiced.   Neurological:      General: No focal deficit present.      Mental Status: She is alert and oriented to person, place, and time.            Additional Data:     Labs:  Results from last 7 days   Lab Units 02/27/24  0452 02/26/24  0438   WBC Thousand/uL 20.69* 16.59*   HEMOGLOBIN g/dL 11.6 11.6   HEMATOCRIT % 35.4 36.4    PLATELETS Thousands/uL 219 200   NEUTROS PCT %  --  82*   LYMPHS PCT %  --  6*   MONOS PCT %  --  11   EOS PCT %  --  0     Results from last 7 days   Lab Units 24  0944 24  0452   SODIUM mmol/L  --  140   POTASSIUM mmol/L  --  4.3   CHLORIDE mmol/L  --  103   CO2 mmol/L  --  29   BUN mg/dL  --  35*   CREATININE mg/dL  --  0.82   ANION GAP mmol/L  --  8   CALCIUM mg/dL  --  9.0   ALBUMIN g/dL 3.4*  --    TOTAL BILIRUBIN mg/dL 0.37  --    ALK PHOS U/L 63  --    ALT U/L 5*  --    AST U/L 8*  --    GLUCOSE RANDOM mg/dL  --  122         Results from last 7 days   Lab Units 24  1628   POC GLUCOSE mg/dl 91         Results from last 7 days   Lab Units 24  0438 24  0524   PROCALCITONIN ng/ml 0.24 0.30*       Lines/Drains:  Invasive Devices       Peripheral Intravenous Line  Duration             Peripheral IV 24 Right;Ventral (anterior) Forearm 3 days                      Telemetry:  Telemetry Orders (From admission, onward)               24 Hour Telemetry Monitoring  Continuous x 24 Hours (Telem)           Question:  Reason for 24 Hour Telemetry  Answer:  Arrhythmias requiring acute medical intervention / PPM or ICD malfunction                     Telemetry Reviewed: Normal Sinus Rhythm  Indication for Continued Telemetry Use: Acute MI/Unstable Angina/Rule out ACS             Imaging: Reviewed radiology reports from this admission including:   XR chest 1 view portable    Result Date: 2024  Impression: Mild pulmonary interstitial edema. Workstation performed: [id number]     CT chest with contrast    Result Date: 2/15/2024  Impression: No evidence for pulmonary embolism. Mild pulmonary edema. Scattered areas of tree-in-bud opacity consistent with endobronchial impaction/infection. Workstation performed: [id number]       XR abdomen 1 view kub    Result Date: 2024  Impression No acute findings. Workstation performed: [id number]     XR chest portable    Result Date:  2/26/2024  Impression Findings compatible with right middle lobe atelectasis. Workstation performed: [id number]        Results for orders placed during the hospital encounter of 02/15/24    Echo complete w/ contrast if indicated    Interpretation Summary    Left Ventricle: Left ventricular cavity size is normal. Wall thickness is mildly increased. There is mild asymmetric hypertrophy of the basal septal wall. The left ventricular ejection fraction is 35%. Systolic function is severely reduced. Diastolic function is mildly abnormal, consistent with grade I (abnormal) relaxation.    The following segments are hypokinetic: mid anteroseptal, mid inferoseptal, apical anterior, apical septal, apical inferior and apex.    Mitral Valve: There is severe annular calcification.    Tricuspid Valve: There is mild regurgitation.    IVC/SVC: The inferior vena cava is dilated.    Pulmonary Artery: The estimated pulmonary artery systolic pressure is 40.0 mmHg. The pulmonary artery systolic pressure is mildly increased.      Recent Cultures (last 7 days):   Results from last 7 days   Lab Units 02/20/24  1616   BLOOD CULTURE  No Growth After 5 Days.  No Growth After 5 Days.       Last 24 Hours Medication List:   Current Facility-Administered Medications   Medication Dose Route Frequency Provider Last Rate    acetaminophen  975 mg Oral Q8H PRN Vasu Ramos PA-C      albuterol  2 puff Inhalation Q4H PRN Vasu Ramos PA-C      albuterol  2.5 mg Nebulization Q4H PRN Vasu Ramos PA-C      aluminum-magnesium hydroxide-simethicone  30 mL Oral Q6H PRN Vasu Ramos PA-C      aspirin  81 mg Oral Daily Vasu Ramos PA-C      baricitinib  2 mg Oral Q24H Vasu Ramos PA-C      benzonatate  100 mg Oral TID Alcides Rutherford MD      budesonide-formoterol  2 puff Inhalation BID Vasu Ramos PA-C      buPROPion  75 mg Oral Daily Vasu Ramos PA-C      carvedilol  25 mg Oral BID  With Meals Vasu Ramos PA-C      cefTRIAXone  1,000 mg Intravenous Q24H Latia Dominique Saraiya, DO 1,000 mg (02/27/24 0745)    docusate sodium  100 mg Oral BID Vasu Ramos PA-C      furosemide  20 mg Oral Daily Alcides Rutherford MD      guaiFENesin  1,200 mg Oral Q12H ROSELINE Latia Dominique Saraiya, DO      heparin (porcine)  5,000 Units Subcutaneous Q8H [facility] Alcides Rutherford MD      ipratropium  0.5 mg Nebulization BID Vasu Ramos PA-C      labetalol  10 mg Intravenous Q6H PRN Vasu Ramos PA-C      levalbuterol  1.25 mg Nebulization BID Vasu Ramos PA-C      lidocaine  2 patch Topical Daily PRN Vasu Ramos PA-C      LORazepam  0.5 mg Oral BID PRN Latia Dominique Saraiya, DO      melatonin  3 mg Oral HS PRN Vasu Ramos PA-C      midodrine  2.5 mg Oral TID AC Vasu Ramos PA-C      nitroglycerin  0.4 mg Sublingual Q5 Min PRN Vasu Ramos PA-C      ondansetron  4 mg Intravenous Q6H PRN Vasu Ramos PA-C      pantoprazole  40 mg Oral Daily Vasu Ramos PA-C      pravastatin  80 mg Oral QPM Vasu Ramos PA-C      predniSONE  40 mg Oral Daily Alcides Rutherford MD      vilazodone  40 mg Oral Daily With Breakfast Vasu Ramos PA-C      Zanubrutinib  160 mg Oral BID Vasu Ramos PA-C          Today, Patient Was Seen By: Ander Duarte and the attending physician, eKnny Stone,*        **Please Note: This note may have been constructed using a voice recognition system.**

## 2024-02-27 NOTE — ASSESSMENT & PLAN NOTE
Currently SpO2: 90 % on Nasal Cannula O2 Flow Rate (L/min): 5 L/min    At baseline, uses 4L NC  CT Imaging showing mild pulm edema, scattered areas of tree-in-bud opacity  Continue to treat underlying NSTEMI, COVID, mild pulm edema  2/23 repeat chest xray Unchanged appearance of mild interstitial edema.Right basilar opacity likely representing small effusion with atelectasis. Please note pneumonia may demonstrate a similar appearance the appropriate clinical setting.  2/26 CXR was generally unchanged from 2/23 CXR. Continue treatment for pneumonia vs COPD exacerbation vs atelectasis. Pt continues to complain of pleuritic right lower chest pain.   Pt continues to complain of right upper abdominal/lower chest wall pain. On exam today found to have a palpable lower liver edge that was tender to palpation. Will order hepatic function tests as well as RUQ US to evaluate any hepatic source of her pain.  Pt still has cough, but sob has improved. Continue prednisone taper.  Procalcitonin 0.24, IV ceftriaxone. Procalcitonin downtrending, likely transition to po Augmentin tomorrow   Oxygen wean down to baseline 4L NC

## 2024-02-27 NOTE — PLAN OF CARE
Problem: Potential for Falls  Goal: Patient will remain free of falls  Description: INTERVENTIONS:  - Educate patient/family on patient safety including physical limitations  - Instruct patient to call for assistance with activity   - Consult OT/PT to assist with strengthening/mobility   - Keep Call bell within reach  - Keep bed low and locked with side rails adjusted as appropriate  - Keep care items and personal belongings within reach  - Initiate and maintain comfort rounds  - Make Fall Risk Sign visible to staff  - Offer Toileting every 2 Hours, in advance of need  - Initiate/Maintain bed alarm  - Obtain necessary fall risk management equipment  - Apply yellow socks and bracelet for high fall risk patients  - Consider moving patient to room near nurses station  2/27/2024 1043 by Brissa Braun RN  Outcome: Progressing  2/27/2024 1043 by Brissa Braun RN  Outcome: Progressing  2/27/2024 1043 by Brissa Braun RN  Outcome: Progressing     Problem: RESPIRATORY - ADULT  Goal: Achieves optimal ventilation and oxygenation  Description: INTERVENTIONS:  - Assess for changes in respiratory status  - Assess for changes in mentation and behavior  - Position to facilitate oxygenation and minimize respiratory effort  - Oxygen administered by appropriate delivery if ordered  - Initiate smoking cessation education as indicated  - Encourage broncho-pulmonary hygiene including cough, deep breathe, Incentive Spirometry  - Assess the need for suctioning and aspirate as needed  - Assess and instruct to report SOB or any respiratory difficulty  - Respiratory Therapy support as indicated  2/27/2024 1043 by Brissa Braun RN  Outcome: Progressing  2/27/2024 1043 by Brissa Braun RN  Outcome: Progressing  2/27/2024 1043 by Brissa Bruan RN  Outcome: Progressing     Problem: Prexisting or High Potential for Compromised Skin Integrity  Goal: Skin integrity is maintained or  improved  Description: INTERVENTIONS:  - Identify patients at risk for skin breakdown  - Assess and monitor skin integrity  - Assess and monitor nutrition and hydration status  - Monitor labs   - Assess for incontinence   - Turn and reposition patient  - Assist with mobility/ambulation  - Relieve pressure over bony prominences  - Avoid friction and shearing  - Provide appropriate hygiene as needed including keeping skin clean and dry  - Evaluate need for skin moisturizer/barrier cream  - Collaborate with interdisciplinary team   - Patient/family teaching  - Consider wound care consult   2/27/2024 1043 by Brissa Braun RN  Outcome: Progressing  2/27/2024 1043 by Brissa Braun RN  Outcome: Progressing  2/27/2024 1043 by Brissa Braun RN  Outcome: Progressing     Problem: PAIN - ADULT  Goal: Verbalizes/displays adequate comfort level or baseline comfort level  Description: Interventions:  - Encourage patient to monitor pain and request assistance  - Assess pain using appropriate pain scale  - Administer analgesics based on type and severity of pain and evaluate response  - Implement non-pharmacological measures as appropriate and evaluate response  - Consider cultural and social influences on pain and pain management  - Notify physician/advanced practitioner if interventions unsuccessful or patient reports new pain  2/27/2024 1043 by Brissa Braun RN  Outcome: Progressing  2/27/2024 1043 by Brissa Braun RN  Outcome: Progressing  2/27/2024 1043 by Brissa Braun RN  Outcome: Progressing     Problem: INFECTION - ADULT  Goal: Absence or prevention of progression during hospitalization  Description: INTERVENTIONS:  - Assess and monitor for signs and symptoms of infection  - Monitor lab/diagnostic results  - Monitor all insertion sites, i.e. indwelling lines, tubes, and drains  - Monitor endotracheal if appropriate and nasal secretions for changes in amount and color  -  Nebo appropriate cooling/warming therapies per order  - Administer medications as ordered  - Instruct and encourage patient and family to use good hand hygiene technique  - Identify and instruct in appropriate isolation precautions for identified infection/condition  2/27/2024 1043 by Brissa Braun RN  Outcome: Progressing  2/27/2024 1043 by Brissa Braun RN  Outcome: Progressing  Goal: Absence of fever/infection during neutropenic period  Description: INTERVENTIONS:  - Monitor WBC    2/27/2024 1043 by Brissa Braun RN  Outcome: Progressing  2/27/2024 1043 by Brissa Braun RN  Outcome: Progressing     Problem: SAFETY ADULT  Goal: Patient will remain free of falls  Description: INTERVENTIONS:  - Educate patient/family on patient safety including physical limitations  - Instruct patient to call for assistance with activity   - Consult OT/PT to assist with strengthening/mobility   - Keep Call bell within reach  - Keep bed low and locked with side rails adjusted as appropriate  - Keep care items and personal belongings within reach  - Initiate and maintain comfort rounds  - Make Fall Risk Sign visible to staff  - Offer Toileting every 2 Hours, in advance of need  - Initiate/Maintain bed alarm  - Obtain necessary fall risk management equipment  - Apply yellow socks and bracelet for high fall risk patients  - Consider moving patient to room near nurses station  2/27/2024 1043 by Brissa Braun RN  Outcome: Progressing  2/27/2024 1043 by Brissa Braun RN  Outcome: Progressing  2/27/2024 1043 by Brissa Braun RN  Outcome: Progressing  Goal: Maintain or return to baseline ADL function  Description: INTERVENTIONS:  -  Assess patient's ability to carry out ADLs; assess patient's baseline for ADL function and identify physical deficits which impact ability to perform ADLs (bathing, care of mouth/teeth, toileting, grooming, dressing, etc.)  - Assess/evaluate cause of  self-care deficits   - Assess range of motion  - Assess patient's mobility; develop plan if impaired  - Assess patient's need for assistive devices and provide as appropriate  - Encourage maximum independence but intervene and supervise when necessary  - Involve family in performance of ADLs  - Assess for home care needs following discharge   - Consider OT consult to assist with ADL evaluation and planning for discharge  - Provide patient education as appropriate  2/27/2024 1043 by Brissa Braun RN  Outcome: Progressing  2/27/2024 1043 by Brissa Braun RN  Outcome: Progressing  Goal: Maintains/Returns to pre admission functional level  Description: INTERVENTIONS:  - Perform AM-PAC 6 Click Basic Mobility/ Daily Activity assessment daily.  - Set and communicate daily mobility goal to care team and patient/family/caregiver.   - Collaborate with rehabilitation services on mobility goals if consulted  - Perform Range of Motion 3 times a day.  - Reposition patient every 2 hours.  - Dangle patient 3 times a day  - Stand patient 3 times a day  - Ambulate patient 3 times a day  - Out of bed to chair 3 times a day   - Out of bed for meals 3 times a day  - Out of bed for toileting  - Record patient progress and toleration of activity level   2/27/2024 1043 by Brissa Braun RN  Outcome: Progressing  2/27/2024 1043 by Brissa Braun RN  Outcome: Progressing     Problem: DISCHARGE PLANNING  Goal: Discharge to home or other facility with appropriate resources  Description: INTERVENTIONS:  - Identify barriers to discharge w/patient and caregiver  - Arrange for needed discharge resources and transportation as appropriate  - Identify discharge learning needs (meds, wound care, etc.)  - Arrange for interpretive services to assist at discharge as needed  - Refer to Case Management Department for coordinating discharge planning if the patient needs post-hospital services based on physician/advanced  practitioner order or complex needs related to functional status, cognitive ability, or social support system  2/27/2024 1043 by Brissa Braun RN  Outcome: Progressing  2/27/2024 1043 by Brissa Braun RN  Outcome: Progressing     Problem: Knowledge Deficit  Goal: Patient/family/caregiver demonstrates understanding of disease process, treatment plan, medications, and discharge instructions  Description: Complete learning assessment and assess knowledge base.  Interventions:  - Provide teaching at level of understanding  - Provide teaching via preferred learning methods  2/27/2024 1043 by Brissa Braun RN  Outcome: Progressing  2/27/2024 1043 by Brissa Braun RN  Outcome: Progressing     Problem: MOBILITY - ADULT  Goal: Maintain or return to baseline ADL function  Description: INTERVENTIONS:  -  Assess patient's ability to carry out ADLs; assess patient's baseline for ADL function and identify physical deficits which impact ability to perform ADLs (bathing, care of mouth/teeth, toileting, grooming, dressing, etc.)  - Assess/evaluate cause of self-care deficits   - Assess range of motion  - Assess patient's mobility; develop plan if impaired  - Assess patient's need for assistive devices and provide as appropriate  - Encourage maximum independence but intervene and supervise when necessary  - Involve family in performance of ADLs  - Assess for home care needs following discharge   - Consider OT consult to assist with ADL evaluation and planning for discharge  - Provide patient education as appropriate  2/27/2024 1043 by Brissa Braun RN  Outcome: Progressing  2/27/2024 1043 by Brissa Braun RN  Outcome: Progressing  Goal: Maintains/Returns to pre admission functional level  Description: INTERVENTIONS:  - Perform AM-PAC 6 Click Basic Mobility/ Daily Activity assessment daily.  - Set and communicate daily mobility goal to care team and patient/family/caregiver.   -  Collaborate with rehabilitation services on mobility goals if consulted  - Perform Range of Motion 3 times a day.  - Reposition patient every 2 hours.  - Dangle patient 3 times a day  - Stand patient 3 times a day  - Ambulate patient 3 times a day  - Out of bed to chair 3 times a day   - Out of bed for meals 3 times a day  - Out of bed for toileting  - Record patient progress and toleration of activity level   2/27/2024 1043 by Brissa Braun RN  Outcome: Progressing  2/27/2024 1043 by Brissa Braun, YASIR  Outcome: Progressing

## 2024-02-28 ENCOUNTER — APPOINTMENT (INPATIENT)
Dept: CT IMAGING | Facility: HOSPITAL | Age: 79
DRG: 871 | End: 2024-02-28
Payer: MEDICARE

## 2024-02-28 ENCOUNTER — APPOINTMENT (INPATIENT)
Dept: ULTRASOUND IMAGING | Facility: HOSPITAL | Age: 79
DRG: 871 | End: 2024-02-28
Payer: MEDICARE

## 2024-02-28 LAB
ALBUMIN SERPL BCP-MCNC: 3.2 G/DL (ref 3.5–5)
ALP SERPL-CCNC: 68 U/L (ref 34–104)
ALT SERPL W P-5'-P-CCNC: 6 U/L (ref 7–52)
ANION GAP SERPL CALCULATED.3IONS-SCNC: 8 MMOL/L
AST SERPL W P-5'-P-CCNC: 10 U/L (ref 13–39)
BASOPHILS # BLD AUTO: 0.02 THOUSANDS/ÂΜL (ref 0–0.1)
BASOPHILS NFR BLD AUTO: 0 % (ref 0–1)
BILIRUB SERPL-MCNC: 0.38 MG/DL (ref 0.2–1)
BUN SERPL-MCNC: 39 MG/DL (ref 5–25)
CALCIUM ALBUM COR SERPL-MCNC: 9.5 MG/DL (ref 8.3–10.1)
CALCIUM SERPL-MCNC: 8.9 MG/DL (ref 8.4–10.2)
CHLORIDE SERPL-SCNC: 102 MMOL/L (ref 96–108)
CO2 SERPL-SCNC: 27 MMOL/L (ref 21–32)
CREAT SERPL-MCNC: 0.96 MG/DL (ref 0.6–1.3)
EOSINOPHIL # BLD AUTO: 0.01 THOUSAND/ÂΜL (ref 0–0.61)
EOSINOPHIL NFR BLD AUTO: 0 % (ref 0–6)
ERYTHROCYTE [DISTWIDTH] IN BLOOD BY AUTOMATED COUNT: 13 % (ref 11.6–15.1)
GFR SERPL CREATININE-BSD FRML MDRD: 57 ML/MIN/1.73SQ M
GLUCOSE SERPL-MCNC: 110 MG/DL (ref 65–140)
HCT VFR BLD AUTO: 33.8 % (ref 34.8–46.1)
HGB BLD-MCNC: 10.9 G/DL (ref 11.5–15.4)
IMM GRANULOCYTES # BLD AUTO: 0.1 THOUSAND/UL (ref 0–0.2)
IMM GRANULOCYTES NFR BLD AUTO: 1 % (ref 0–2)
L PNEUMO1 AG UR QL IA.RAPID: NEGATIVE
LYMPHOCYTES # BLD AUTO: 1.09 THOUSANDS/ÂΜL (ref 0.6–4.47)
LYMPHOCYTES NFR BLD AUTO: 7 % (ref 14–44)
MCH RBC QN AUTO: 30.5 PG (ref 26.8–34.3)
MCHC RBC AUTO-ENTMCNC: 32.2 G/DL (ref 31.4–37.4)
MCV RBC AUTO: 95 FL (ref 82–98)
MONOCYTES # BLD AUTO: 0.95 THOUSAND/ÂΜL (ref 0.17–1.22)
MONOCYTES NFR BLD AUTO: 6 % (ref 4–12)
NEUTROPHILS # BLD AUTO: 13.17 THOUSANDS/ÂΜL (ref 1.85–7.62)
NEUTS SEG NFR BLD AUTO: 86 % (ref 43–75)
NRBC BLD AUTO-RTO: 0 /100 WBCS
PLATELET # BLD AUTO: 198 THOUSANDS/UL (ref 149–390)
PMV BLD AUTO: 11.4 FL (ref 8.9–12.7)
POTASSIUM SERPL-SCNC: 4.1 MMOL/L (ref 3.5–5.3)
PROCALCITONIN SERPL-MCNC: 0.92 NG/ML
PROT SERPL-MCNC: 6 G/DL (ref 6.4–8.4)
RBC # BLD AUTO: 3.57 MILLION/UL (ref 3.81–5.12)
S PNEUM AG UR QL: NEGATIVE
SODIUM SERPL-SCNC: 137 MMOL/L (ref 135–147)
WBC # BLD AUTO: 15.34 THOUSAND/UL (ref 4.31–10.16)

## 2024-02-28 PROCEDURE — 85025 COMPLETE CBC W/AUTO DIFF WBC: CPT | Performed by: INTERNAL MEDICINE

## 2024-02-28 PROCEDURE — 94640 AIRWAY INHALATION TREATMENT: CPT

## 2024-02-28 PROCEDURE — 94668 MNPJ CHEST WALL SBSQ: CPT

## 2024-02-28 PROCEDURE — 87449 NOS EACH ORGANISM AG IA: CPT | Performed by: INTERNAL MEDICINE

## 2024-02-28 PROCEDURE — 74177 CT ABD & PELVIS W/CONTRAST: CPT

## 2024-02-28 PROCEDURE — 94760 N-INVAS EAR/PLS OXIMETRY 1: CPT

## 2024-02-28 PROCEDURE — 84145 PROCALCITONIN (PCT): CPT | Performed by: PHYSICIAN ASSISTANT

## 2024-02-28 PROCEDURE — 99223 1ST HOSP IP/OBS HIGH 75: CPT | Performed by: INTERNAL MEDICINE

## 2024-02-28 PROCEDURE — 94664 DEMO&/EVAL PT USE INHALER: CPT

## 2024-02-28 PROCEDURE — 99233 SBSQ HOSP IP/OBS HIGH 50: CPT | Performed by: INTERNAL MEDICINE

## 2024-02-28 PROCEDURE — 92610 EVALUATE SWALLOWING FUNCTION: CPT

## 2024-02-28 PROCEDURE — 80053 COMPREHEN METABOLIC PANEL: CPT | Performed by: INTERNAL MEDICINE

## 2024-02-28 PROCEDURE — 76705 ECHO EXAM OF ABDOMEN: CPT

## 2024-02-28 PROCEDURE — 71275 CT ANGIOGRAPHY CHEST: CPT

## 2024-02-28 RX ORDER — SODIUM CHLORIDE FOR INHALATION 3 %
4 VIAL, NEBULIZER (ML) INHALATION
Status: DISCONTINUED | OUTPATIENT
Start: 2024-02-28 | End: 2024-03-07 | Stop reason: HOSPADM

## 2024-02-28 RX ORDER — LEVALBUTEROL INHALATION SOLUTION 1.25 MG/3ML
1.25 SOLUTION RESPIRATORY (INHALATION)
Status: DISCONTINUED | OUTPATIENT
Start: 2024-02-28 | End: 2024-03-07 | Stop reason: HOSPADM

## 2024-02-28 RX ADMIN — FUROSEMIDE 20 MG: 20 TABLET ORAL at 11:18

## 2024-02-28 RX ADMIN — HEPARIN SODIUM 5000 UNITS: 5000 INJECTION INTRAVENOUS; SUBCUTANEOUS at 21:05

## 2024-02-28 RX ADMIN — BARICITINIB 2 MG: 2 TABLET, FILM COATED ORAL at 11:25

## 2024-02-28 RX ADMIN — CARVEDILOL 25 MG: 12.5 TABLET, FILM COATED ORAL at 11:17

## 2024-02-28 RX ADMIN — BUDESONIDE AND FORMOTEROL FUMARATE DIHYDRATE 2 PUFF: 160; 4.5 AEROSOL RESPIRATORY (INHALATION) at 17:48

## 2024-02-28 RX ADMIN — SODIUM CHLORIDE SOLN NEBU 3% 4 ML: 3 NEBU SOLN at 20:58

## 2024-02-28 RX ADMIN — VILAZODONE HYDROCHLORIDE 40 MG: 20 TABLET ORAL at 11:23

## 2024-02-28 RX ADMIN — ASPIRIN 81 MG: 81 TABLET, COATED ORAL at 11:13

## 2024-02-28 RX ADMIN — SODIUM CHLORIDE SOLN NEBU 3% 4 ML: 3 NEBU SOLN at 13:44

## 2024-02-28 RX ADMIN — LEVALBUTEROL HYDROCHLORIDE 1.25 MG: 1.25 SOLUTION RESPIRATORY (INHALATION) at 13:44

## 2024-02-28 RX ADMIN — HEPARIN SODIUM 5000 UNITS: 5000 INJECTION INTRAVENOUS; SUBCUTANEOUS at 14:41

## 2024-02-28 RX ADMIN — LEVALBUTEROL HYDROCHLORIDE 1.25 MG: 1.25 SOLUTION RESPIRATORY (INHALATION) at 20:30

## 2024-02-28 RX ADMIN — NITROGLYCERIN 0.4 MG: 0.4 TABLET SUBLINGUAL at 08:08

## 2024-02-28 RX ADMIN — ZANUBRUTINIB 160 MG: 80 CAPSULE, GELATIN COATED ORAL at 11:24

## 2024-02-28 RX ADMIN — BUDESONIDE AND FORMOTEROL FUMARATE DIHYDRATE 2 PUFF: 160; 4.5 AEROSOL RESPIRATORY (INHALATION) at 11:22

## 2024-02-28 RX ADMIN — AMPICILLIN SODIUM AND SULBACTAM SODIUM 3 G: 100; 50 INJECTION, POWDER, FOR SOLUTION INTRAVENOUS at 20:26

## 2024-02-28 RX ADMIN — PREDNISONE 40 MG: 20 TABLET ORAL at 11:18

## 2024-02-28 RX ADMIN — GUAIFENESIN 1200 MG: 600 TABLET ORAL at 11:16

## 2024-02-28 RX ADMIN — DOCUSATE SODIUM 100 MG: 100 CAPSULE, LIQUID FILLED ORAL at 11:18

## 2024-02-28 RX ADMIN — IPRATROPIUM BROMIDE 0.5 MG: 0.5 SOLUTION RESPIRATORY (INHALATION) at 13:44

## 2024-02-28 RX ADMIN — HEPARIN SODIUM 5000 UNITS: 5000 INJECTION INTRAVENOUS; SUBCUTANEOUS at 06:04

## 2024-02-28 RX ADMIN — AMPICILLIN SODIUM AND SULBACTAM SODIUM 3 G: 100; 50 INJECTION, POWDER, FOR SOLUTION INTRAVENOUS at 14:46

## 2024-02-28 RX ADMIN — MORPHINE SULFATE 2 MG: 2 INJECTION, SOLUTION INTRAMUSCULAR; INTRAVENOUS at 14:41

## 2024-02-28 RX ADMIN — NITROGLYCERIN 0.4 MG: 0.4 TABLET SUBLINGUAL at 08:14

## 2024-02-28 RX ADMIN — ALBUTEROL SULFATE 2 PUFF: 90 AEROSOL, METERED RESPIRATORY (INHALATION) at 08:13

## 2024-02-28 RX ADMIN — BENZONATATE 100 MG: 100 CAPSULE ORAL at 11:16

## 2024-02-28 RX ADMIN — ACETAMINOPHEN 975 MG: 325 TABLET, FILM COATED ORAL at 04:12

## 2024-02-28 RX ADMIN — LEVALBUTEROL HYDROCHLORIDE 1.25 MG: 1.25 SOLUTION RESPIRATORY (INHALATION) at 07:34

## 2024-02-28 RX ADMIN — IOHEXOL 100 ML: 350 INJECTION, SOLUTION INTRAVENOUS at 09:23

## 2024-02-28 RX ADMIN — MIDODRINE HYDROCHLORIDE 2.5 MG: 5 TABLET ORAL at 11:15

## 2024-02-28 RX ADMIN — IPRATROPIUM BROMIDE 0.5 MG: 0.5 SOLUTION RESPIRATORY (INHALATION) at 07:34

## 2024-02-28 RX ADMIN — AMPICILLIN SODIUM AND SULBACTAM SODIUM 3 G: 100; 50 INJECTION, POWDER, FOR SOLUTION INTRAVENOUS at 11:29

## 2024-02-28 RX ADMIN — NITROGLYCERIN 0.4 MG: 0.4 TABLET SUBLINGUAL at 08:20

## 2024-02-28 RX ADMIN — MORPHINE SULFATE 2 MG: 2 INJECTION, SOLUTION INTRAMUSCULAR; INTRAVENOUS at 08:36

## 2024-02-28 RX ADMIN — BUPROPION HYDROCHLORIDE 75 MG: 75 TABLET, FILM COATED ORAL at 11:14

## 2024-02-28 RX ADMIN — IPRATROPIUM BROMIDE 0.5 MG: 0.5 SOLUTION RESPIRATORY (INHALATION) at 20:30

## 2024-02-28 RX ADMIN — MIDODRINE HYDROCHLORIDE 2.5 MG: 5 TABLET ORAL at 06:04

## 2024-02-28 RX ADMIN — MORPHINE SULFATE 2 MG: 2 INJECTION, SOLUTION INTRAMUSCULAR; INTRAVENOUS at 20:35

## 2024-02-28 RX ADMIN — PANTOPRAZOLE SODIUM 40 MG: 40 TABLET, DELAYED RELEASE ORAL at 11:17

## 2024-02-28 NOTE — PLAN OF CARE
Recommendations:   Diet: NPO   Meds: non-oral if possible   Feeding assistance: NA  Frequent Oral care: 2-4x/day  Aspiration precautions and compensatory swallowing strategies: upright posture and oral care   Other Recommendations/ considerations: MBS

## 2024-02-28 NOTE — ASSESSMENT & PLAN NOTE
Lab Results   Component Value Date    SARSCOV2 Positive (A) 02/15/2024    SARSCOV2 Negative 12/12/2023    SARSCOV2 Negative 10/28/2023       Symptoms of COVID-19 SYMPTOMS: shortness of breath and Tachycardia (more than 100 beats per min)  Vaccine status: vaccinated  The patient does have acute hypoxemic respiratory failure given the same    CT chest w contrast showing Scattered areas of tree-in-bud opacity consistent with endobronchial impaction/infection  S/p remdesevir, IV steroids  Continue baricitinib given worsening oxygen requirement on 2/16  Continue to monitor

## 2024-02-28 NOTE — NURSING NOTE
"Patient refused to have IV site changed despite education on why site needs to be rotated. Patient stated\" its still works\".     "

## 2024-02-28 NOTE — ASSESSMENT & PLAN NOTE
Lab Results   Component Value Date    EGFR 57 02/28/2024    EGFR 69 02/27/2024    EGFR 51 02/26/2024    CREATININE 0.96 02/28/2024    CREATININE 0.82 02/27/2024    CREATININE 1.05 02/26/2024     Pt has a history of CKD stage 2  Transitioned to po lasix 20 mg daily,   Cont to monitor bmp

## 2024-02-28 NOTE — ASSESSMENT & PLAN NOTE
Currently SpO2: 91 % on Nasal Cannula O2 Flow Rate (L/min): 6 L/min    At baseline, uses 4L NC  CT Imaging showing mild pulm edema, scattered areas of tree-in-bud opacity  Continue to treat underlying NSTEMI, COVID, mild pulm edema  2/23 repeat chest xray Unchanged appearance of mild interstitial edema.Right basilar opacity likely representing small effusion with atelectasis. Please note pneumonia may demonstrate a similar appearance the appropriate clinical setting.  2/26 CXR was generally unchanged from 2/23 CXR. Continue treatment for pneumonia vs COPD exacerbation vs atelectasis. Pt continues to complain of pleuritic right lower chest pain.   Pt continues to complain of right upper abdominal/lower chest wall pain. On exam today found to have a palpable lower liver edge that was tender to palpation. Hepatic function tests and RUQ US were negative for any acute process.   IR was consulted for drainage of right lower lobe fluid accumulation, but decided the accumulation was too small to tap. Will get a repeat chest CT to evaluate effusion as well as broaden antibiotic coverage to IV Unasyn for suspected pleural empyema  Pt still has cough, but sob has improved. Continue prednisone taper.  Oxygen wean down to baseline 4L NC

## 2024-02-28 NOTE — PLAN OF CARE
Problem: Potential for Falls  Goal: Patient will remain free of falls  Description: INTERVENTIONS:  - Educate patient/family on patient safety including physical limitations  - Instruct patient to call for assistance with activity   - Consult OT/PT to assist with strengthening/mobility   - Keep Call bell within reach  - Keep bed low and locked with side rails adjusted as appropriate  - Keep care items and personal belongings within reach  - Initiate and maintain comfort rounds  - Make Fall Risk Sign visible to staff  - Offer Toileting every 2  Hours, in advance of need  - Initiate/Maintain bed alarm  - Obtain necessary fall risk management equipment:   - Apply yellow socks and bracelet for high fall risk patients  - Consider moving patient to room near nurses station  Outcome: Progressing     Problem: RESPIRATORY - ADULT  Goal: Achieves optimal ventilation and oxygenation  Description: INTERVENTIONS:  - Assess for changes in respiratory status  - Assess for changes in mentation and behavior  - Position to facilitate oxygenation and minimize respiratory effort  - Oxygen administered by appropriate delivery if ordered  - Initiate smoking cessation education as indicated  - Encourage broncho-pulmonary hygiene including cough, deep breathe, Incentive Spirometry  - Assess the need for suctioning and aspirate as needed  - Assess and instruct to report SOB or any respiratory difficulty  - Respiratory Therapy support as indicated  Outcome: Progressing     Problem: SAFETY ADULT  Goal: Patient will remain free of falls  Description: INTERVENTIONS:  - Educate patient/family on patient safety including physical limitations  - Instruct patient to call for assistance with activity   - Consult OT/PT to assist with strengthening/mobility   - Keep Call bell within reach  - Keep bed low and locked with side rails adjusted as appropriate  - Keep care items and personal belongings within reach  - Initiate and maintain comfort  rounds  - Make Fall Risk Sign visible to staff  - Offer Toileting every 2  Hours, in advance of need  - Initiate/Maintain bed alarm  - Obtain necessary fall risk management equipment:   - Apply yellow socks and bracelet for high fall risk patients  - Consider moving patient to room near nurses station  Outcome: Progressing  Goal: Maintain or return to baseline ADL function  Description: INTERVENTIONS:  -  Assess patient's ability to carry out ADLs; assess patient's baseline for ADL function and identify physical deficits which impact ability to perform ADLs (bathing, care of mouth/teeth, toileting, grooming, dressing, etc.)  - Assess/evaluate cause of self-care deficits   - Assess range of motion  - Assess patient's mobility; develop plan if impaired  - Assess patient's need for assistive devices and provide as appropriate  - Encourage maximum independence but intervene and supervise when necessary  - Involve family in performance of ADLs  - Assess for home care needs following discharge   - Consider OT consult to assist with ADL evaluation and planning for discharge  - Provide patient education as appropriate  Outcome: Progressing     Problem: Knowledge Deficit  Goal: Patient/family/caregiver demonstrates understanding of disease process, treatment plan, medications, and discharge instructions  Description: Complete learning assessment and assess knowledge base.  Interventions:  - Provide teaching at level of understanding  - Provide teaching via preferred learning methods  Outcome: Progressing     Problem: MOBILITY - ADULT  Goal: Maintain or return to baseline ADL function  Description: INTERVENTIONS:  -  Assess patient's ability to carry out ADLs; assess patient's baseline for ADL function and identify physical deficits which impact ability to perform ADLs (bathing, care of mouth/teeth, toileting, grooming, dressing, etc.)  - Assess/evaluate cause of self-care deficits   - Assess range of motion  - Assess  patient's mobility; develop plan if impaired  - Assess patient's need for assistive devices and provide as appropriate  - Encourage maximum independence but intervene and supervise when necessary  - Involve family in performance of ADLs  - Assess for home care needs following discharge   - Consider OT consult to assist with ADL evaluation and planning for discharge  - Provide patient education as appropriate  Outcome: Progressing

## 2024-02-28 NOTE — PROGRESS NOTES
[facility]  Progress Note  Name: Lauren Quintero I  MRN: [id number]  Unit/Bed#: -01 I Date of Admission: 2/15/2024   Date of Service: 2/28/2024 I Hospital Day: 13    Assessment/Plan   CKD (chronic kidney disease)  Assessment & Plan  Lab Results   Component Value Date    EGFR 57 02/28/2024    EGFR 69 02/27/2024    EGFR 51 02/26/2024    CREATININE 0.96 02/28/2024    CREATININE 0.82 02/27/2024    CREATININE 1.05 02/26/2024     Pt has a history of CKD stage 2  Transitioned to po lasix 20 mg daily,   Cont to monitor bmp    COVID-19  Assessment & Plan  Lab Results   Component Value Date    SARSCOV2 Positive (A) 02/15/2024    SARSCOV2 Negative 12/12/2023    SARSCOV2 Negative 10/28/2023       Symptoms of COVID-19 SYMPTOMS: shortness of breath and Tachycardia (more than 100 beats per min)  Vaccine status: vaccinated  The patient does have acute hypoxemic respiratory failure given the same    CT chest w contrast showing Scattered areas of tree-in-bud opacity consistent with endobronchial impaction/infection  S/p remdesevir, IV steroids  Finishing baricitinib as well  Continue to monitor    COPD with exacerbation (HCC)  Assessment & Plan  Worsening Sob, wheezing, increased o2 requirements as above.   scheduled nebulizer treatment  Continue with po prednisone taper    Major depressive disorder, recurrent episode, moderate (HCC)  Assessment & Plan  Continue wellbutrin, vilazodone  Also takes Vraylar; recommend bringing from home as we do not have it on formulary - patient now has it     Acute on chronic respiratory failure with hypoxia (HCC)  Assessment & Plan  Currently SpO2: 91 % on Nasal Cannula O2 Flow Rate (L/min): 6 L/min    At baseline, uses 4L NC  CT Imaging showing mild pulm edema, scattered areas of tree-in-bud opacity  Continue to treat underlying NSTEMI, COVID, mild pulm edema  2/23 repeat chest xray Unchanged appearance of mild interstitial edema.Right basilar opacity likely representing  small effusion with atelectasis. Please note pneumonia may demonstrate a similar appearance the appropriate clinical setting.  2/26 CXR was generally unchanged from 2/23 CXR. Continue treatment for pneumonia vs COPD exacerbation vs atelectasis. Pt continues to complain of pleuritic right lower chest pain.   Pt continues to complain of right upper abdominal/lower chest wall pain. On exam today found to have a palpable lower liver edge that was tender to palpation. Hepatic function tests and RUQ US were negative for any acute process.   IR was consulted for drainage of right lower lobe fluid accumulation, but decided the accumulation was too small to tap.   Repeat chest CT with concern for RLL infiltrate - atelectasis - Start Unasyn, Pulmonary consult, check procal, airway clearance measures.  Prognosis guarded      Essential hypertension  Assessment & Plan  Continue home meds; initiate lisinopril as part of NSTEMI management  Continue carvedilol  PRN IV Labetalol for SBP > 160  Resume lasix 20 mg po daily  Continue to monitor blood pressure          * NSTEMI (non-ST elevated myocardial infarction) (HCC)  Assessment & Plan  Lab Results   Component Value Date    HSTNI0 2,584 (H) 02/17/2024    HSTNI2 2,049 (H) 02/17/2024    HSTNID2 -535 02/17/2024      CT chest showing no evidence of pulmonary embolism, did note mild pulmonary edema  No chest pain present, but significant SOB, anginal equivalent, sudden onset, fairly elevated troponins  EKG: Sinus Tachycardia with no obvious STEMI  Suspect a true type I NSTEMI  ADRIEN Score: 5  The patient's presentation with shortness of breath was somewhat sudden according to the son, he was driving her home from the rehab facility if she got acutely short of breath and hypoxemic again.  Certainly it is possible that the COVID infection may have triggered some degree of underlying CAD leading to an NSTEMI.  Can stop heparin drip  Cardiology following.  Cardiac cath showed significant  triple vessel disease with 90% stenosis in the mid LAD, 80% stenosis in the proximal LAD, 85% stenosis in the mid Cx, and 70% in the RCA with a patent mid RCA stent. Cardiology recommends maximizing medical therapy for her CAD and follow-up outpatient with a CT surgeon for a potential CABG once she fully recovers from COVID.  Given new reduced EF of 35%, Lifevest has been fitted today  Patient is ready for rehab from cardiac standpoint                   VTE Pharmacologic Prophylaxis: VTE Score: 7 High Risk (Score >/= 5) - Pharmacological DVT Prophylaxis Ordered: heparin. Sequential Compression Devices Ordered.    Mobility:   Basic Mobility Inpatient Raw Score: 18  JH-HLM Goal: 6: Walk 10 steps or more  JH-HLM Achieved: 1: Laying in bed  HLM Goal NOT achieved. Continue with multidisciplinary rounding and encourage appropriate mobility to improve upon HLM goals.    Patient Centered Rounds: I performed bedside rounds with nursing staff today.  Discussions with Specialists or Other Care Team Provider:  IP CONSULT TO CARDIOLOGY      Education and Discussions with Family / Patient:     Current Length of Stay: 13 day(s)  Current Patient Status: Inpatient   Certification Statement: The patient will continue to require additional inpatient hospital stay due to plan as noted above  Discharge Plan: Anticipate discharge in 24-48 hrs to rehab facility.    Code Status: Level 3 - DNAR and DNI    Subjective:   Pt is resting in bed. Still complains of pleuritic right chest pain that is not improving. Says her cough is getting better. Denies any CP, palpitations, abdominal pain, fever. All questions answered.     Objective:     Vitals:   Temp (24hrs), Av.8 °F (36.6 °C), Min:97.6 °F (36.4 °C), Max:98.1 °F (36.7 °C)    Temp:  [97.6 °F (36.4 °C)-98.1 °F (36.7 °C)] 97.9 °F (36.6 °C)  HR:  [84-89] 89  Resp:  [18-22] 18  BP: ()/(54-76) 113/58  SpO2:  [91 %-98 %] 91 %  Body mass index is 20.07 kg/m².     Input and Output Summary  (last 24 hours):   No intake or output data in the 24 hours ending 02/28/24 1010    Physical Exam:   Physical Exam  Constitutional:       Appearance: Normal appearance. She is not ill-appearing, toxic-appearing or diaphoretic.   HENT:      Head: Normocephalic and atraumatic.      Right Ear: External ear normal. There is no impacted cerumen.      Left Ear: External ear normal. There is no impacted cerumen.      Nose: Nose normal.      Mouth/Throat:      Mouth: Mucous membranes are moist.      Pharynx: Oropharynx is clear.   Eyes:      Extraocular Movements: Extraocular movements intact.      Conjunctiva/sclera: Conjunctivae normal.   Cardiovascular:      Rate and Rhythm: Normal rate and regular rhythm.      Pulses: Normal pulses.      Heart sounds: Normal heart sounds. No murmur heard.  Pulmonary:      Effort: Respiratory distress present.      Breath sounds: Rhonchi present.   Chest:      Chest wall: Tenderness present.   Abdominal:      General: Bowel sounds are normal. There is no distension.      Palpations: Abdomen is soft. There is no mass.      Tenderness: There is no abdominal tenderness. There is no guarding or rebound.      Hernia: No hernia is present.   Musculoskeletal:      Right lower leg: No edema.      Left lower leg: No edema.   Skin:     General: Skin is warm and dry.      Capillary Refill: Capillary refill takes less than 2 seconds.   Neurological:      General: No focal deficit present.      Mental Status: She is alert and oriented to person, place, and time.      Cranial Nerves: No cranial nerve deficit.      Sensory: No sensory deficit.      Motor: No weakness.      Coordination: Coordination normal.            Additional Data:     Labs:  Results from last 7 days   Lab Units 02/28/24  0517   WBC Thousand/uL 15.34*   HEMOGLOBIN g/dL 10.9*   HEMATOCRIT % 33.8*   PLATELETS Thousands/uL 198   NEUTROS PCT % 86*   LYMPHS PCT % 7*   MONOS PCT % 6   EOS PCT % 0     Results from last 7 days   Lab Units  02/28/24  0517   SODIUM mmol/L 137   POTASSIUM mmol/L 4.1   CHLORIDE mmol/L 102   CO2 mmol/L 27   BUN mg/dL 39*   CREATININE mg/dL 0.96   ANION GAP mmol/L 8   CALCIUM mg/dL 8.9   ALBUMIN g/dL 3.2*   TOTAL BILIRUBIN mg/dL 0.38   ALK PHOS U/L 68   ALT U/L 6*   AST U/L 10*   GLUCOSE RANDOM mg/dL 110                 Results from last 7 days   Lab Units 02/26/24  0438 02/25/24  0524   PROCALCITONIN ng/ml 0.24 0.30*       Lines/Drains:  Invasive Devices       Peripheral Intravenous Line  Duration             Peripheral IV 02/23/24 Right;Ventral (anterior) Forearm 4 days    Peripheral IV 02/28/24 Right Antecubital <1 day                      Telemetry:  Telemetry Orders (From admission, onward)               24 Hour Telemetry Monitoring  Continuous x 24 Hours (Telem)        Expiring   Question:  Reason for 24 Hour Telemetry  Answer:  Arrhythmias requiring acute medical intervention / PPM or ICD malfunction                     Telemetry Reviewed: Normal Sinus Rhythm  Indication for Continued Telemetry Use: Acute MI/Unstable Angina/Rule out ACS             Imaging: Reviewed radiology reports from this admission including:   XR chest 1 view portable    Result Date: 2/16/2024  Impression: Mild pulmonary interstitial edema. Workstation performed: [id number]     CT chest with contrast    Result Date: 2/15/2024  Impression: No evidence for pulmonary embolism. Mild pulmonary edema. Scattered areas of tree-in-bud opacity consistent with endobronchial impaction/infection. Workstation performed: [id number]       No Chest XR results available for this patient.     Results for orders placed during the hospital encounter of 02/15/24    Echo complete w/ contrast if indicated    Interpretation Summary    Left Ventricle: Left ventricular cavity size is normal. Wall thickness is mildly increased. There is mild asymmetric hypertrophy of the basal septal wall. The left ventricular ejection fraction is 35%. Systolic function is severely reduced.  Diastolic function is mildly abnormal, consistent with grade I (abnormal) relaxation.    The following segments are hypokinetic: mid anteroseptal, mid inferoseptal, apical anterior, apical septal, apical inferior and apex.    Mitral Valve: There is severe annular calcification.    Tricuspid Valve: There is mild regurgitation.    IVC/SVC: The inferior vena cava is dilated.    Pulmonary Artery: The estimated pulmonary artery systolic pressure is 40.0 mmHg. The pulmonary artery systolic pressure is mildly increased.      Recent Cultures (last 7 days):         Last 24 Hours Medication List:   Current Facility-Administered Medications   Medication Dose Route Frequency Provider Last Rate    acetaminophen  975 mg Oral Q8H PRN Vasu Ramos PA-C      albuterol  2 puff Inhalation Q4H PRN Vasu Ramos PA-C      albuterol  2.5 mg Nebulization Q4H PRN Vasu Ramos PA-C      aluminum-magnesium hydroxide-simethicone  30 mL Oral Q6H PRN Vasu Ramos PA-C      ampicillin-sulbactam  3 g Intravenous Q6H Kenny Stone MD      aspirin  81 mg Oral Daily Vasu Ramos PA-C      baricitinib  2 mg Oral Q24H Vasu Ramos PA-C      benzonatate  100 mg Oral TID Alcides Rutherford MD      budesonide-formoterol  2 puff Inhalation BID Vasu Ramos PA-C      buPROPion  75 mg Oral Daily Vasu Ramos PA-C      carvedilol  25 mg Oral BID With Meals Vasu Ramos PA-C      docusate sodium  100 mg Oral BID Vasu Ramos PA-C      furosemide  20 mg Oral Daily Alcides Rutherford MD      guaiFENesin  1,200 mg Oral Q12H [facility] Latia Cortes DO      heparin (porcine)  5,000 Units Subcutaneous Q8H [facility] Alcides Rutherford MD      ipratropium  0.5 mg Nebulization BID Vasu Ramos PA-C      labetalol  10 mg Intravenous Q6H PRN Vasu Ramos PA-C      levalbuterol  1.25 mg Nebulization BID Vasu Ramos PA-C      lidocaine  2 patch Topical Daily  PRN Vasu Ramos PA-C      LORazepam  0.5 mg Oral BID PRN Latia Dominique Dianeya, DO      melatonin  3 mg Oral HS PRN Vasu Ramos PA-C      midodrine  2.5 mg Oral TID AC Vasu Ramos PA-C      morphine injection  2 mg Intravenous Q6H PRN Kenny Stone MD      nitroglycerin  0.4 mg Sublingual Q5 Min PRN Vaus Ramos PA-C      ondansetron  4 mg Intravenous Q6H PRN Vasu Ramos PA-C      pantoprazole  40 mg Oral Daily Vasu Ramos PA-C      pravastatin  80 mg Oral QPM Vasu Ramos PA-C      predniSONE  40 mg Oral Daily Alcides Rutherford MD      vilazodone  40 mg Oral Daily With Breakfast Vasu Ramos PA-C      Zanubrutinib  160 mg Oral BID Vasu Ramos PA-C          Today, Patient Was Seen By: Ander Duarte and the attending physician, Kenny Stone,*        **Please Note: This note may have been constructed using a voice recognition system.**

## 2024-02-28 NOTE — CASE MANAGEMENT
Case Management Progress Note    Patient name Lauren Quintero  Location /-01 MRN [id number]  : 1945 Date 2024       LOS (days): 13  Geometric Mean LOS (GMLOS) (days): 5.1  Days to GMLOS:-7.8        OBJECTIVE:    Current admission status: Inpatient  Preferred Pharmacy:   [id number] - [place] - [address]  [address]  [place] [address]  Phone: [phone] Fax: [phone]    [facility]  [place] - [address]  [address]  [address]  [place] [address]  Phone: [phone] Fax: [phone]    Primary Care Provider: Starla Bateman MD  Primary Insurance: MEDICARE  Secondary Insurance:     PROGRESS NOTE:  CM continues to follow for d/c planning.  Contact maintained with [facility]ine at [facility].  Facility will accept patient for placement once medically cleared.

## 2024-02-28 NOTE — RESPIRATORY THERAPY NOTE
02/27/24 1918   Respiratory Protocol   Protocol Initiated? No   Protocol Selection Respiratory   Language Barrier? No   Medical & Social History Reviewed? Yes   Diagnostic Studies Reviewed? Yes   Physical Assessment Performed? Yes   Home Devices/Therapy Home O2   Respiratory Plan Home Bronchodilator Patient pathway   Respiratory Assessment   General Appearance Awake;Alert   Respiratory Pattern Normal   Chest Assessment Chest expansion symmetrical   Bilateral Breath Sounds Clear;Diminished   Cough None   Resp Comments no acute respiratory changes   O2 Device 5L nc   Additional Assessments   SpO2 94 %

## 2024-02-28 NOTE — CONSULTS
Consultation - Pulmonary Medicine   Lauren Ingrid 78 y.o. female MRN: [id number]  Unit/Bed#: -01 Encounter: [id number]      Assessment:  Acute on chronic hypoxemic respiratory failure  COVID-19 pneumonia  Superimposed bacterial pneumonia with abnormal chest CT  COPD with acute exacerbation  Triple-vessel CAD    Plan:   Acute on chronic hypoxemic respiratory failure multifactorial in the setting of COVID-19 pneumonia, COPD exacerbation, triple-vessel CAD/NSTEMI, also with right lower lobe pneumonia/mucus impaction likely related to her COVID infection  She has completed her treatment for COVID with remdesivir and Decadron, remains on baricitinib  Continues to require oxygen above her baseline of 4 L, currently on 6L  CTA done yesterday shows no PE, shows large area of consolidation/atelectasis in the right lower lung with opacification of the right lower lobe bronchus due to secretions/mucous plug, scattered multifocal areas of nodular densities suggesting pneumonia/aspiration  She did receive 4 days of ceftriaxone, today was switched to Unasyn given CT findings  She does have a leukocytosis likely contributed by steroids, procalcitonin last checked on 226 was normal, had been slightly elevated the day prior at 0.3  She has remained afebrile during admission  Currently on Symbicort, Xopenex and atrovent  Continue prednisone, tapering by 10 mg every 3 days    Will order repeat procal for this morning  Patient is high risk for bronchiscopy given NSTEMI and triple vessel disease  Possible this is related to aspiration especially given her acute illness.  Previously had required thickened liquids on prior speech evaluations  Will order another speech evaluation to be done this admission  Will attempt less invasive airway clearance methods with nebs, will add hypertonic saline, will add flutter valve, will consider VEST though given her discomfort she may not tolerate this  Will hold off on any plan for bronchoscopy  at this time  Will continue to follow      History of Present Illness   Physician Requesting Consult: Kenny Stone,*  Reason for Consult / Principal Problem: Pneumonia, COVID  Hx and PE limited by: none  HPI: Lauren Quintero is a 78 y.o. year old female former smoker with past medical history of COPD, chronic hypoxemic respiratory failure, hypertension, CHF, CAD who presents with complaint of worsening shortness of breath and worsening oxygen requirements.     During this hospitalization she was treated for COVID.  Also underwent cardiac catheterization and was found to have triple-vessel disease, decision made for medical management at this time with outpatient referral to CT surgery.  She has continued to require oxygen above her baseline.  Also complaining of worsening right-sided pleuritic pain and right upper quadrant pain during admission. Pain has been present the last 4 days. She is feeling short of breath due to the pain. She previously had been recommended to have nectar thick liquids but has not been compliant with this.      Inpatient consult to Pulmonology  Consult performed by: Herve Jerez PA-C  Consult ordered by: Kenny Stone MD          Review of Systems   Constitutional: Negative.    HENT: Negative.     Respiratory:  Positive for cough and shortness of breath.    Cardiovascular: Negative.    Gastrointestinal: Negative.    Genitourinary: Negative.    Musculoskeletal:  Positive for back pain.   Skin: Negative.    Allergic/Immunologic: Negative.    Neurological: Negative.    Psychiatric/Behavioral: Negative.         Historical Information   Past Medical History:   Diagnosis Date    Acute congestive heart failure (HCC) 8/27/2021    Anxiety     Cardiac disease     Chest pain 8/27/2021    Chronic pain disorder     COPD (chronic obstructive pulmonary disease) ([facility])     Depression     Heart disease     Hyperlipidemia     Hypertension     MI (myocardial infarction) ([facility])     MRSA  (methicillin resistant Staphylococcus aureus)     Renal disorder     benign kidney tumor     Past Surgical History:   Procedure Laterality Date    APPENDECTOMY      CARDIAC CATHETERIZATION N/A 2024    Procedure: Cardiac catheterization;  Surgeon: Luke Malagon MD;  Location: [facility];  Service: Cardiology    CARDIAC CATHETERIZATION N/A 2024    Procedure: Cardiac Coronary Angiogram;  Surgeon: Luke Malagon MD;  Location: [facility];  Service: Cardiology    CARDIAC CATHETERIZATION Left 2024    Procedure: Cardiac Left Heart Cath;  Surgeon: Luke Malagon MD;  Location: [facility];  Service: Cardiology    ELBOW BURSA SURGERY Left 2018    D/T MRSA INFECTION    SPINAL FUSION      L7 L9     Social History   Social History     Substance and Sexual Activity   Alcohol Use Never     Social History     Substance and Sexual Activity   Drug Use No     E-Cigarette/Vaping    E-Cigarette Use Never User      E-Cigarette/Vaping Substances    Nicotine No     THC No     CBD No     Flavoring No     Other No     Unknown No      Social History     Tobacco Use   Smoking Status Former    Current packs/day: 0.00    Average packs/day: 1 pack/day for 60.0 years (60.0 ttl pk-yrs)    Types: Cigarettes    Start date:     Quit date:     Years since quittin.1   Smokeless Tobacco Never   Tobacco Comments    She has close to a 60 pack-year smoking history, most recently has cut down to 4-5 cigarettes daily     Occupational History:     Family History:   Family History   Problem Relation Age of Onset    Heart disease Mother     Cancer Father        Meds/Allergies   all current active meds have been reviewed, pertinent pulmonary meds have been reviewed, and current meds:   Current Facility-Administered Medications   Medication Dose Route Frequency    acetaminophen (TYLENOL) tablet 975 mg  975 mg Oral Q8H PRN    albuterol (PROVENTIL HFA,VENTOLIN HFA) inhaler 2 puff  2 puff  Inhalation Q4H PRN    albuterol inhalation solution 2.5 mg  2.5 mg Nebulization Q4H PRN    aluminum-magnesium hydroxide-simethicone (MAALOX) oral suspension 30 mL  30 mL Oral Q6H PRN    ampicillin-sulbactam (UNASYN) 3 g in sodium chloride 0.9 % 100 mL IVPB  3 g Intravenous Q6H    aspirin (ECOTRIN LOW STRENGTH) EC tablet 81 mg  81 mg Oral Daily    baricitinib (OLUMIANT) tablet 2 mg  2 mg Oral Q24H    budesonide-formoterol (SYMBICORT) 160-4.5 mcg/act inhaler 2 puff  2 puff Inhalation BID    buPROPion (WELLBUTRIN) tablet 75 mg  75 mg Oral Daily    carvedilol (COREG) tablet 25 mg  25 mg Oral BID With Meals    docusate sodium (COLACE) capsule 100 mg  100 mg Oral BID    furosemide (LASIX) tablet 20 mg  20 mg Oral Daily    guaiFENesin (MUCINEX) 12 hr tablet 1,200 mg  1,200 mg Oral Q12H ROSELINE    heparin (porcine) subcutaneous injection 5,000 Units  5,000 Units Subcutaneous Q8H ROSELINE    ipratropium (ATROVENT) 0.02 % inhalation solution 0.5 mg  0.5 mg Nebulization BID    labetalol (NORMODYNE) injection 10 mg  10 mg Intravenous Q6H PRN    levalbuterol (XOPENEX) inhalation solution 1.25 mg  1.25 mg Nebulization BID    lidocaine (LIDODERM) 5 % patch 2 patch  2 patch Topical Daily PRN    LORazepam (ATIVAN) tablet 0.5 mg  0.5 mg Oral BID PRN    melatonin tablet 3 mg  3 mg Oral HS PRN    midodrine (PROAMATINE) tablet 2.5 mg  2.5 mg Oral TID AC    morphine injection 2 mg  2 mg Intravenous Q6H PRN    nitroglycerin (NITROSTAT) SL tablet 0.4 mg  0.4 mg Sublingual Q5 Min PRN    ondansetron (ZOFRAN) injection 4 mg  4 mg Intravenous Q6H PRN    pantoprazole (PROTONIX) EC tablet 40 mg  40 mg Oral Daily    pravastatin (PRAVACHOL) tablet 80 mg  80 mg Oral QPM    predniSONE tablet 40 mg  40 mg Oral Daily    vilazodone (VIIBRYD) tablet 40 mg  40 mg Oral Daily With Breakfast    Zanubrutinib CAPS 160 mg  160 mg Oral BID       Allergies   Allergen Reactions    Sulfa Antibiotics Anaphylaxis    Iron GI Intolerance    Niacin     Statins Other (See  "Comments)     cramps       Objective   Vitals: Blood pressure 125/63, pulse 89, temperature 97.9 °F (36.6 °C), temperature source Oral, resp. rate 18, height 5' 8\" (1.727 m), weight 59.9 kg (132 lb), SpO2 91%, not currently breastfeeding.,Body mass index is 20.07 kg/m².  No intake or output data in the 24 hours ending 02/28/24 1116  Invasive Devices       Peripheral Intravenous Line  Duration             Peripheral IV 02/23/24 Right;Ventral (anterior) Forearm 4 days    Peripheral IV 02/28/24 Right Antecubital <1 day                    Physical Exam  Vitals reviewed.   Constitutional:       General: She is not in acute distress.     Appearance: Normal appearance. She is obese. She is not ill-appearing.   HENT:      Head: Normocephalic and atraumatic.      Mouth/Throat:      Pharynx: Oropharynx is clear.   Eyes:      Conjunctiva/sclera: Conjunctivae normal.   Cardiovascular:      Rate and Rhythm: Normal rate and regular rhythm.   Pulmonary:      Effort: Pulmonary effort is normal. No respiratory distress.      Breath sounds: Examination of the right-lower field reveals decreased breath sounds. Decreased breath sounds and rhonchi (scatttered) present. No wheezing.   Abdominal:      General: Abdomen is flat. There is no distension.   Musculoskeletal:         General: Normal range of motion.      Cervical back: Normal range of motion.      Right lower leg: No edema.      Left lower leg: No edema.   Skin:     General: Skin is warm and dry.   Neurological:      Mental Status: She is alert and oriented to person, place, and time.   Psychiatric:         Mood and Affect: Mood normal.         Behavior: Behavior normal.         Lab Results: I have personally reviewed pertinent lab results., CBC:   Lab Results   Component Value Date    WBC 15.34 (H) 02/28/2024    HGB 10.9 (L) 02/28/2024    HCT 33.8 (L) 02/28/2024    MCV 95 02/28/2024     02/28/2024    RBC 3.57 (L) 02/28/2024    MCH 30.5 02/28/2024    MCHC 32.2 02/28/2024 "    RDW 13.0 02/28/2024    MPV 11.4 02/28/2024    NRBC 0 02/28/2024   , CMP:   Lab Results   Component Value Date    SODIUM 137 02/28/2024    K 4.1 02/28/2024     02/28/2024    CO2 27 02/28/2024    BUN 39 (H) 02/28/2024    CREATININE 0.96 02/28/2024    CALCIUM 8.9 02/28/2024    AST 10 (L) 02/28/2024    ALT 6 (L) 02/28/2024    ALKPHOS 68 02/28/2024    EGFR 57 02/28/2024     Imaging Studies: I have personally reviewed pertinent reports.   and I have personally reviewed pertinent films in PACS  EKG, Pathology, and Other Studies: I have personally reviewed pertinent reports.    VTE Prophylaxis: Sequential compression device (Venodyne)  and Heparin    Code Status: Level 3 - DNAR and DNI  Advance Directive and Living Will:      Power of :    POLST:

## 2024-02-28 NOTE — SPEECH THERAPY NOTE
Speech-Language Pathology Bedside Swallow Evaluation        Patient Name: Lauren Quintero  Today's Date: 2/28/2024     Problem List  Principal Problem:    NSTEMI (non-ST elevated myocardial infarction) ([facility])  Active Problems:    Essential hypertension    Acute on chronic respiratory failure with hypoxia ([facility])    Major depressive disorder, recurrent episode, moderate ([facility])    COPD with exacerbation ([facility])    COVID-19    CKD (chronic kidney disease)       Past Medical History  Past Medical History:   Diagnosis Date    Acute congestive heart failure ([facility]) 8/27/2021    Anxiety     Cardiac disease     Chest pain 8/27/2021    Chronic pain disorder     COPD (chronic obstructive pulmonary disease) ([facility])     Depression     Heart disease     Hyperlipidemia     Hypertension     MI (myocardial infarction) ([facility])     MRSA (methicillin resistant Staphylococcus aureus)     Renal disorder     benign kidney tumor       Past Surgical History  Past Surgical History:   Procedure Laterality Date    APPENDECTOMY      CARDIAC CATHETERIZATION N/A 2/21/2024    Procedure: Cardiac catheterization;  Surgeon: Luke Malagon MD;  Location: [facility];  Service: Cardiology    CARDIAC CATHETERIZATION N/A 2/21/2024    Procedure: Cardiac Coronary Angiogram;  Surgeon: Luke Malagon MD;  Location: [facility];  Service: Cardiology    CARDIAC CATHETERIZATION Left 2/21/2024    Procedure: Cardiac Left Heart Cath;  Surgeon: Luke Malagon MD;  Location: [facility];  Service: Cardiology    ELBOW BURSA SURGERY Left 02/2018    D/T MRSA INFECTION    SPINAL FUSION      L7 L9       Summary/Impressions:    Pt presents with s/s oropharyngeal dysphagia characterized by overt coughing, audible congestion and increased WOB across all PO trials, including dry swallow containing secretions.  Patient c/o pain in chest and back - requesting to d/c further trials.  Of note, patient known to this dept from May '23; completed MBS which was  significant for silent aspiration (thin liquid).  Cannot r/o pharyngeal dysphagia or aspiration at this point.  Recommend NPO w/ further assessment by way of modified barium swallow study to r/o aspiration and determine east restrictive diet.     Recommendations:   Diet: NPO   Meds: non-oral if possible   Feeding assistance: NA  Frequent Oral care: 2-4x/day  Aspiration precautions and compensatory swallowing strategies: upright posture and oral care   [place]ations/ considerations[place] [facility]      Current Medical Status  Pt is a 78 y.o. female who presented to [facility] with Acute on chronic hypoxemic respiratory failure multifactorial in the setting of COVID-19 pneumonia, COPD exacerbation, triple-vessel CAD/NSTEMI, also with right lower lobe pneumonia/mucus impaction likely related to her COVID infection.  Per review of chart known hx of dysphagia and aspiration     Past medical history:  Please see H&P for details    Special Studies:  CT chest 2/28/24:  Large area of consolidation/atelectasis right lower lung With opacification of the right lower lobe bronchus probably due to secretions/mucous plug. Consider pulmonary evaluation     There are scattered multifocal areas of centrilobular nodular densities, branching: Bilateral opacities and mild consolidations in the both upper lobes, posterior segment right upper lobe, suggesting pneumonia/aspiration.     [facility] [place]:  Assessment Summary;  Pt presents with mild oropharyngeal dysphagia characterized by silent aspiration of thin liquids.  Delayed swallow with spillage of all textures in  pharynx.  Aspiration of pooled thin liquid material in the pyriforms.  No patient response.  Chin tuck, cup v straw sip ineffective in preventing aspiration.  CP prominence without retropulsion.         Social/Education/Vocational Hx:  Pt lives alone    Swallow Information   Current Risks for Dysphagia & Aspiration: known history of dysphagia and known history of  aspiration  Current Symptoms/Concerns: change in respiratory status  Current Diet: regular diet and thin liquids   Baseline Diet: regular diet and thin liquids    Baseline Assessment   Behavior/Cognition: alert  Speech/Language Status: able to participate in conversation  Patient Positioning: upright in bed    Swallow Mechanism Exam   Facial: symmetrical  Labial: WFL  Lingual: WFL  Velum: symmetrical  Mandible: adequate ROM  Dentition: edentulous  Vocal quality:breathy   Volitional Cough: weak   Respiratory: 4L NC    Consistencies Assessed and Performance   Consistencies Administered: nectar thick, honey thick, and puree    Oral Stage: Adequate bolus retrieval with prolonged transfer.  Mastication not assessed.  No oral retention.     Pharyngeal Stage: Suspect pharyngeal dysphagia. Laryngeal rise noted upon palpation.  Swallow initation appears delayed.  Overt cough, audible congestion and increased WOB with all PO trials. Cannot r/o aspiration.    Esophageal Concerns: none reported      Results Reviewed with: patient, RN, and MD     Plan  Will continue to follow for x1-3    Dysphagia Goals: pt will participate in VBS    Discharge recommendation: home health    Speech Therapy Prognosis   Prognosis: fair  Prognosis Considerations: medical status        Corine Gastelum MS, CCC-SLP  Speech-Language Pathologist  [place] #[id number]  [place] #[id number]

## 2024-02-28 NOTE — RESPIRATORY THERAPY NOTE
RT Protocol Note  Lauren Quintero 78 y.o. female MRN: [id number]  Unit/Bed#: -01 Encounter: [id number]    Assessment    Principal Problem:    NSTEMI (non-ST elevated myocardial infarction) ([facility])  Active Problems:    Essential hypertension    Acute on chronic respiratory failure with hypoxia ([facility])    Major depressive disorder, recurrent episode, moderate ([facility])    COPD with exacerbation ([facility])    COVID-19    CKD (chronic kidney disease)      Home Pulmonary Medications:  Nebs/inhalers    Home Devices/Therapy: Home O2    Past Medical History:   Diagnosis Date    Acute congestive heart failure (HCC) 2021    Anxiety     Cardiac disease     Chest pain 2021    Chronic pain disorder     COPD (chronic obstructive pulmonary disease) ([facility])     Depression     Heart disease     Hyperlipidemia     Hypertension     MI (myocardial infarction) ([facility])     MRSA (methicillin resistant Staphylococcus aureus)     Renal disorder     benign kidney tumor     Social History     Socioeconomic History    Marital status:      Spouse name: None    Number of children: 3    Years of education: 13    Highest education level: High school graduate   Occupational History    Occupation:      Employer: [employer]     Comment: retired    Tobacco Use    Smoking status: Former     Current packs/day: 0.00     Average packs/day: 1 pack/day for 60.0 years (60.0 ttl pk-yrs)     Types: Cigarettes     Start date:      Quit date: 2016     Years since quittin.1    Smokeless tobacco: Never    Tobacco comments:     She has close to a 60 pack-year smoking history, most recently has cut down to 4-5 cigarettes daily   Vaping Use    Vaping status: Never Used   Substance and Sexual Activity    Alcohol use: Never    Drug use: No    Sexual activity: Not Currently   Other Topics Concern    None   Social History Narrative    None     Social Determinants of Health     Financial Resource Strain: Patient Declined  (1/6/2024)    Received from [facility]    Overall Financial Resource Strain (CARDIA)     Difficulty of Paying Living Expenses: Patient declined   Food Insecurity: No Food Insecurity (2/19/2024)    Hunger Vital Sign     Worried About Running Out of Food in the Last Year: Never true     Ran Out of Food in the Last Year: Never true   Transportation Needs: No Transportation Needs (2/19/2024)    PRAPARE - Transportation     Lack of Transportation (Medical): No     Lack of Transportation (Non-Medical): No   Physical Activity: Patient Declined (1/6/2024)    Received from [facility]    Exercise Vital Sign     Days of Exercise per Week: Patient declined     Minutes of Exercise per Session: Patient declined   Stress: Patient Declined (1/6/2024)    Received from [facility]    [employer] - Occupational Stress Questionnaire     Feeling of Stress : Patient declined   Social Connections: Patient Declined (1/6/2024)    Received from [facility]    Social Connection and Isolation Panel [NHANES]     Frequency of Communication with Friends and Family: Patient declined     Frequency of Social Gatherings with Friends and Family: Patient declined     Attends Mandaen Services: Patient declined     Active Member of Clubs or Organizations: Patient declined     Attends Club or Organization Meetings: Patient declined     Marital Status: Patient declined   Intimate Partner Violence: Patient Declined (1/6/2024)    Received from [facility]    Humiliation, Afraid, Rape, and Kick questionnaire     Fear of Current or Ex-Partner: Patient declined     Emotionally Abused: Patient declined     Physically Abused: Patient declined     Sexually Abused: Patient declined   Housing Stability: Low Risk  (2/19/2024)    Housing Stability Vital Sign     Unable to Pay for Housing in the Last Year: No     Number of Places Lived in the Last Year: 2     Unstable Housing in the Last Year: No  "      Subjective    Subjective Data: awake and alert    Objective    Physical Exam:   Assessment Type: During-treatment  General Appearance: Awake, Alert  Respiratory Pattern: Normal  Chest Assessment: Chest expansion symmetrical  Bilateral Breath Sounds: Diminished, Rhonchi  Cough: Congested, Non-productive  O2 Device: NC    Vitals:  Blood pressure 166/76, pulse 89, temperature 98.1 °F (36.7 °C), resp. rate (P) 18, height 5' 8\" (1.727 m), weight 59.9 kg (132 lb), SpO2 95%, not currently breastfeeding.          Imaging and other studies: I have personally reviewed pertinent reports.      O2 Device: NC     Plan    Respiratory Plan: Home Bronchodilator Patient pathway        Resp Comments: Pt resting comfortably and in no apparent distress.  States she's having trouble breathing and clearing secretions.  Pt w/ congested NPC.  Will cont w/ current therapy.   "

## 2024-02-29 ENCOUNTER — APPOINTMENT (INPATIENT)
Dept: RADIOLOGY | Facility: HOSPITAL | Age: 79
DRG: 871 | End: 2024-02-29
Payer: MEDICARE

## 2024-02-29 LAB
ALBUMIN SERPL BCP-MCNC: 3.1 G/DL (ref 3.5–5)
ALP SERPL-CCNC: 69 U/L (ref 34–104)
ALT SERPL W P-5'-P-CCNC: 6 U/L (ref 7–52)
ANION GAP SERPL CALCULATED.3IONS-SCNC: 7 MMOL/L
AST SERPL W P-5'-P-CCNC: 12 U/L (ref 13–39)
BASOPHILS # BLD AUTO: 0.02 THOUSANDS/ÂΜL (ref 0–0.1)
BASOPHILS NFR BLD AUTO: 0 % (ref 0–1)
BILIRUB SERPL-MCNC: 0.36 MG/DL (ref 0.2–1)
BUN SERPL-MCNC: 32 MG/DL (ref 5–25)
CALCIUM ALBUM COR SERPL-MCNC: 9.7 MG/DL (ref 8.3–10.1)
CALCIUM SERPL-MCNC: 9 MG/DL (ref 8.4–10.2)
CHLORIDE SERPL-SCNC: 105 MMOL/L (ref 96–108)
CO2 SERPL-SCNC: 30 MMOL/L (ref 21–32)
CREAT SERPL-MCNC: 0.88 MG/DL (ref 0.6–1.3)
EOSINOPHIL # BLD AUTO: 0 THOUSAND/ÂΜL (ref 0–0.61)
EOSINOPHIL NFR BLD AUTO: 0 % (ref 0–6)
ERYTHROCYTE [DISTWIDTH] IN BLOOD BY AUTOMATED COUNT: 13.2 % (ref 11.6–15.1)
GFR SERPL CREATININE-BSD FRML MDRD: 63 ML/MIN/1.73SQ M
GLUCOSE SERPL-MCNC: 105 MG/DL (ref 65–140)
HCT VFR BLD AUTO: 34.9 % (ref 34.8–46.1)
HGB BLD-MCNC: 11 G/DL (ref 11.5–15.4)
IMM GRANULOCYTES # BLD AUTO: 0.11 THOUSAND/UL (ref 0–0.2)
IMM GRANULOCYTES NFR BLD AUTO: 1 % (ref 0–2)
LYMPHOCYTES # BLD AUTO: 0.98 THOUSANDS/ÂΜL (ref 0.6–4.47)
LYMPHOCYTES NFR BLD AUTO: 10 % (ref 14–44)
MCH RBC QN AUTO: 30.4 PG (ref 26.8–34.3)
MCHC RBC AUTO-ENTMCNC: 31.5 G/DL (ref 31.4–37.4)
MCV RBC AUTO: 96 FL (ref 82–98)
MONOCYTES # BLD AUTO: 0.71 THOUSAND/ÂΜL (ref 0.17–1.22)
MONOCYTES NFR BLD AUTO: 7 % (ref 4–12)
NEUTROPHILS # BLD AUTO: 8.35 THOUSANDS/ÂΜL (ref 1.85–7.62)
NEUTS SEG NFR BLD AUTO: 82 % (ref 43–75)
NRBC BLD AUTO-RTO: 0 /100 WBCS
PLATELET # BLD AUTO: 216 THOUSANDS/UL (ref 149–390)
PMV BLD AUTO: 11.5 FL (ref 8.9–12.7)
POTASSIUM SERPL-SCNC: 4.4 MMOL/L (ref 3.5–5.3)
PROCALCITONIN SERPL-MCNC: 0.56 NG/ML
PROT SERPL-MCNC: 6.2 G/DL (ref 6.4–8.4)
RBC # BLD AUTO: 3.62 MILLION/UL (ref 3.81–5.12)
SODIUM SERPL-SCNC: 142 MMOL/L (ref 135–147)
WBC # BLD AUTO: 10.17 THOUSAND/UL (ref 4.31–10.16)

## 2024-02-29 PROCEDURE — 92611 MOTION FLUOROSCOPY/SWALLOW: CPT

## 2024-02-29 PROCEDURE — 74230 X-RAY XM SWLNG FUNCJ C+: CPT

## 2024-02-29 PROCEDURE — 99232 SBSQ HOSP IP/OBS MODERATE 35: CPT | Performed by: INTERNAL MEDICINE

## 2024-02-29 PROCEDURE — 94668 MNPJ CHEST WALL SBSQ: CPT

## 2024-02-29 PROCEDURE — 94640 AIRWAY INHALATION TREATMENT: CPT

## 2024-02-29 PROCEDURE — 99233 SBSQ HOSP IP/OBS HIGH 50: CPT | Performed by: INTERNAL MEDICINE

## 2024-02-29 PROCEDURE — 84145 PROCALCITONIN (PCT): CPT | Performed by: INTERNAL MEDICINE

## 2024-02-29 PROCEDURE — 94760 N-INVAS EAR/PLS OXIMETRY 1: CPT

## 2024-02-29 PROCEDURE — 85025 COMPLETE CBC W/AUTO DIFF WBC: CPT | Performed by: INTERNAL MEDICINE

## 2024-02-29 PROCEDURE — 80053 COMPREHEN METABOLIC PANEL: CPT | Performed by: INTERNAL MEDICINE

## 2024-02-29 RX ADMIN — ZANUBRUTINIB 160 MG: 80 CAPSULE, GELATIN COATED ORAL at 12:13

## 2024-02-29 RX ADMIN — AMPICILLIN SODIUM AND SULBACTAM SODIUM 3 G: 100; 50 INJECTION, POWDER, FOR SOLUTION INTRAVENOUS at 20:35

## 2024-02-29 RX ADMIN — DOCUSATE SODIUM 100 MG: 100 CAPSULE, LIQUID FILLED ORAL at 12:07

## 2024-02-29 RX ADMIN — HEPARIN SODIUM 5000 UNITS: 5000 INJECTION INTRAVENOUS; SUBCUTANEOUS at 21:19

## 2024-02-29 RX ADMIN — LEVALBUTEROL HYDROCHLORIDE 1.25 MG: 1.25 SOLUTION RESPIRATORY (INHALATION) at 13:53

## 2024-02-29 RX ADMIN — SODIUM CHLORIDE SOLN NEBU 3% 4 ML: 3 NEBU SOLN at 21:15

## 2024-02-29 RX ADMIN — HEPARIN SODIUM 5000 UNITS: 5000 INJECTION INTRAVENOUS; SUBCUTANEOUS at 06:39

## 2024-02-29 RX ADMIN — HEPARIN SODIUM 5000 UNITS: 5000 INJECTION INTRAVENOUS; SUBCUTANEOUS at 14:51

## 2024-02-29 RX ADMIN — MORPHINE SULFATE 2 MG: 2 INJECTION, SOLUTION INTRAMUSCULAR; INTRAVENOUS at 12:15

## 2024-02-29 RX ADMIN — AMPICILLIN SODIUM AND SULBACTAM SODIUM 3 G: 100; 50 INJECTION, POWDER, FOR SOLUTION INTRAVENOUS at 14:51

## 2024-02-29 RX ADMIN — AMPICILLIN SODIUM AND SULBACTAM SODIUM 3 G: 100; 50 INJECTION, POWDER, FOR SOLUTION INTRAVENOUS at 08:28

## 2024-02-29 RX ADMIN — IPRATROPIUM BROMIDE 0.5 MG: 0.5 SOLUTION RESPIRATORY (INHALATION) at 20:46

## 2024-02-29 RX ADMIN — GUAIFENESIN 1200 MG: 600 TABLET ORAL at 12:06

## 2024-02-29 RX ADMIN — CARVEDILOL 25 MG: 12.5 TABLET, FILM COATED ORAL at 17:32

## 2024-02-29 RX ADMIN — MORPHINE SULFATE 2 MG: 2 INJECTION, SOLUTION INTRAMUSCULAR; INTRAVENOUS at 20:51

## 2024-02-29 RX ADMIN — BUDESONIDE AND FORMOTEROL FUMARATE DIHYDRATE 2 PUFF: 160; 4.5 AEROSOL RESPIRATORY (INHALATION) at 12:05

## 2024-02-29 RX ADMIN — VILAZODONE HYDROCHLORIDE 40 MG: 20 TABLET ORAL at 12:15

## 2024-02-29 RX ADMIN — MIDODRINE HYDROCHLORIDE 2.5 MG: 5 TABLET ORAL at 17:28

## 2024-02-29 RX ADMIN — LEVALBUTEROL HYDROCHLORIDE 1.25 MG: 1.25 SOLUTION RESPIRATORY (INHALATION) at 20:46

## 2024-02-29 RX ADMIN — PRAVASTATIN SODIUM 80 MG: 80 TABLET ORAL at 17:28

## 2024-02-29 RX ADMIN — IPRATROPIUM BROMIDE 0.5 MG: 0.5 SOLUTION RESPIRATORY (INHALATION) at 13:53

## 2024-02-29 RX ADMIN — ASPIRIN 81 MG: 81 TABLET, COATED ORAL at 12:07

## 2024-02-29 RX ADMIN — SODIUM CHLORIDE SOLN NEBU 3% 4 ML: 3 NEBU SOLN at 07:18

## 2024-02-29 RX ADMIN — LEVALBUTEROL HYDROCHLORIDE 1.25 MG: 1.25 SOLUTION RESPIRATORY (INHALATION) at 07:18

## 2024-02-29 RX ADMIN — GUAIFENESIN 1200 MG: 600 TABLET ORAL at 20:35

## 2024-02-29 RX ADMIN — AMPICILLIN SODIUM AND SULBACTAM SODIUM 3 G: 100; 50 INJECTION, POWDER, FOR SOLUTION INTRAVENOUS at 02:32

## 2024-02-29 RX ADMIN — PREDNISONE 30 MG: 20 TABLET ORAL at 12:07

## 2024-02-29 RX ADMIN — Medication 3 MG: at 21:19

## 2024-02-29 RX ADMIN — PANTOPRAZOLE SODIUM 40 MG: 40 TABLET, DELAYED RELEASE ORAL at 12:06

## 2024-02-29 RX ADMIN — SODIUM CHLORIDE SOLN NEBU 3% 4 ML: 3 NEBU SOLN at 13:53

## 2024-02-29 RX ADMIN — FUROSEMIDE 20 MG: 20 TABLET ORAL at 12:06

## 2024-02-29 RX ADMIN — IPRATROPIUM BROMIDE 0.5 MG: 0.5 SOLUTION RESPIRATORY (INHALATION) at 07:18

## 2024-02-29 RX ADMIN — BUPROPION HYDROCHLORIDE 75 MG: 75 TABLET, FILM COATED ORAL at 12:07

## 2024-02-29 RX ADMIN — MORPHINE SULFATE 2 MG: 2 INJECTION, SOLUTION INTRAMUSCULAR; INTRAVENOUS at 02:44

## 2024-02-29 RX ADMIN — BUDESONIDE AND FORMOTEROL FUMARATE DIHYDRATE 2 PUFF: 160; 4.5 AEROSOL RESPIRATORY (INHALATION) at 17:28

## 2024-02-29 RX ADMIN — DOCUSATE SODIUM 100 MG: 100 CAPSULE, LIQUID FILLED ORAL at 17:29

## 2024-02-29 RX ADMIN — ZANUBRUTINIB 160 MG: 80 CAPSULE, GELATIN COATED ORAL at 17:30

## 2024-02-29 RX ADMIN — MIDODRINE HYDROCHLORIDE 2.5 MG: 5 TABLET ORAL at 12:06

## 2024-02-29 NOTE — CASE MANAGEMENT
Case Management Progress Note    Patient name Lauren Quintero  Location /-01 MRN [id number]  : 1945 Date 2024       LOS (days): 14  Geometric Mean LOS (GMLOS) (days): 5.1  Days to GMLOS:-8.8        OBJECTIVE:     Current admission status: Inpatient  Preferred Pharmacy:   [facility]/pharmacy #[id number] - [place] - [address]  [address]  [place] [address]  Phone: [phone] Fax: [phone]    [facility]  [place] - [address]  [address]  [address]  [place] [address]  Phone: [phone] Fax: [phone]    Primary Care Provider: Starla Bateman MD  Primary Insurance: MEDICARE  Secondary Insurance:     PROGRESS NOTE:  Patient reviewed during Interdisciplinary Rounds with [facility] today.  Anticipated for d/c in 48hrs.  Patient no longer wants to return to [facility] for STR.  New referral opened for placement options.  CM will continue to follow.  Medicare is primary, patient has met admission criteria.

## 2024-02-29 NOTE — PROGRESS NOTES
"Progress Note - Pulmonary   Lauren Quintero 78 y.o. female MRN: [id number]  Unit/Bed#: -01 Encounter: [id number]    Assessment:  Acute on chronic hypoxemic respiratory failure  COVID-19 pneumonia  Superimposed bacterial pneumonia with abnormal chest CT  COPD with acute exacerbation  Triple-vessel CAD    Plan:  Chronic hypoxemic leon failure multifactorial in the setting of COVID-19 pneumonia, COPD exacerbation, triple-vessel CAD/NSTEMI, also now with what appears to be aspiration pneumonia  She has completed her treatment for COVID with remdesivir and Decadron, remains on baricitinib  Currently back to her baseline O2 requirement of 4 L  CT scan shows a large consolidation in the right lower lung with opacification of the right lower lobe bronchus  Speech eval shows aspiration with all consistencies, currently NPO  Can continue Unasyn for 5 day course  Continue airway clearance measures with flutter valve, IS, nebs with hypertonic saline, xopenex and atrovent  No need for bronchoscopy at this time - would be high risk given her triple vessel CAD, recent NSTEMI  Continue Symbicort as well as prednisone taper, decreasing by 10 mg every 3 days  Will continue to follow    Subjective:   Patient resting in bed.  Reports some improvement in the pleuritic pain today.    Objective:     Vitals: Blood pressure 133/59, pulse 88, temperature (!) 97.4 °F (36.3 °C), temperature source Oral, resp. rate 19, height 5' 8\" (1.727 m), weight 59.9 kg (132 lb), SpO2 96%, not currently breastfeeding.,Body mass index is 20.07 kg/m².    No intake or output data in the 24 hours ending 02/29/24 0942    Invasive Devices       Peripheral Intravenous Line  Duration             Peripheral IV 02/23/24 Right;Ventral (anterior) Forearm 5 days    Peripheral IV 02/28/24 Right Antecubital 1 day                    Physical Exam: /59 (BP Location: Left arm)   Pulse 88   Temp (!) 97.4 °F (36.3 °C) (Oral)   Resp 19   Ht 5' 8\" (1.727 m)   Wt " 59.9 kg (132 lb)   SpO2 96%   BMI 20.07 kg/m²   General appearance: alert and oriented, in no acute distress  Head: Normocephalic, without obvious abnormality, atraumatic  Eyes: negative findings: conjunctivae and sclerae normal  Lungs:  Scattered rhonchi  Heart: regular rate and rhythm  Abdomen: normal findings: soft, non-tender  Extremities:  Trace edema  Skin:  Warm and dry  Neurologic: Mental status: Alert, oriented, thought content appropriate     Labs: I have personally reviewed pertinent lab results., CBC:   Lab Results   Component Value Date    WBC 10.17 (H) 02/29/2024    HGB 11.0 (L) 02/29/2024    HCT 34.9 02/29/2024    MCV 96 02/29/2024     02/29/2024    RBC 3.62 (L) 02/29/2024    MCH 30.4 02/29/2024    MCHC 31.5 02/29/2024    RDW 13.2 02/29/2024    MPV 11.5 02/29/2024    NRBC 0 02/29/2024   , CMP:   Lab Results   Component Value Date    SODIUM 142 02/29/2024    K 4.4 02/29/2024     02/29/2024    CO2 30 02/29/2024    BUN 32 (H) 02/29/2024    CREATININE 0.88 02/29/2024    CALCIUM 9.0 02/29/2024    AST 12 (L) 02/29/2024    ALT 6 (L) 02/29/2024    ALKPHOS 69 02/29/2024    EGFR 63 02/29/2024     Imaging and other studies: I have personally reviewed pertinent reports.   and I have personally reviewed pertinent films in PACS

## 2024-02-29 NOTE — ASSESSMENT & PLAN NOTE
Lab Results   Component Value Date    EGFR 63 02/29/2024    EGFR 57 02/28/2024    EGFR 69 02/27/2024    CREATININE 0.88 02/29/2024    CREATININE 0.96 02/28/2024    CREATININE 0.82 02/27/2024     Pt has a history of CKD stage 2  Transitioned to po lasix 20 mg daily,   Cont to monitor bmp

## 2024-02-29 NOTE — PROCEDURES
Speech Pathology - Modified Barium Swallow Study    Patient Name: Lauren Quintero    Today's Date: 2/29/2024     Problem List  Principal Problem:    NSTEMI (non-ST elevated myocardial infarction) ([facility])  Active Problems:    Essential hypertension    Acute on chronic respiratory failure with hypoxia ([facility])    Major depressive disorder, recurrent episode, moderate ([facility])    COPD with exacerbation ([facility])    COVID-19    CKD (chronic kidney disease)      Past Medical History  Past Medical History:   Diagnosis Date    Acute congestive heart failure (HCC) 8/27/2021    Anxiety     Cardiac disease     Chest pain 8/27/2021    Chronic pain disorder     COPD (chronic obstructive pulmonary disease) ([facility])     Depression     Heart disease     Hyperlipidemia     Hypertension     MI (myocardial infarction) ([facility])     MRSA (methicillin resistant Staphylococcus aureus)     Renal disorder     benign kidney tumor       Past Surgical History  Past Surgical History:   Procedure Laterality Date    APPENDECTOMY      CARDIAC CATHETERIZATION N/A 2/21/2024    Procedure: Cardiac catheterization;  Surgeon: Luke Malagon MD;  Location: [facility];  Service: Cardiology    CARDIAC CATHETERIZATION N/A 2/21/2024    Procedure: Cardiac Coronary Angiogram;  Surgeon: Luke Malagon MD;  Location: [facility];  Service: Cardiology    CARDIAC CATHETERIZATION Left 2/21/2024    Procedure: Cardiac Left Heart Cath;  Surgeon: Luke Malagon MD;  Location: [facility];  Service: Cardiology    ELBOW BURSA SURGERY Left 02/2018    D/T MRSA INFECTION    IR THORACENTESIS  2/27/2024    SPINAL FUSION      L7 L9       Assessment Summary:    Pt presents with mild oropharyngeal dysphagia characterized by delayed swallow, weak BOT retraction and reduced hyolaryngeal elevation.  Noted pharyngeal retention (<1/2 bolus) of solids>liquids, which patient makes no attempt to clear.  Requires verbal cues for multiple swallows.  Cannot r/o  diverticulum, noted retention is cleared w/ sips of liquid and multiple swallows by completion of study.  While view limited by patient postioning in fluoro, esophogeal sweep completed in lateral position and significant for retention in the upper esophagus as well.  No carlos aspiration observed under fluoro.  Cannot r/o post swallow aspiration given the above.  Patient c/o discomfort and requests to d/c study.    Note: Images are available for review in PACS as desired.    Recommendations:   Recommended Diet: soft/level 3 diet and thin liquids   Recommended Form of Medications: whole with puree   Aspiration precautions and compensatory swallowing strategies: upright posture, only feed when fully alert, slow rate of feeding, small bites/sips, alternating bites and sips, and multiple swallows w/ single bolus; HOLD if increased RR; aspiration & reflux precautions   Consider referral to:  consider GI workup/PPI  SLP Dysphagia therapy recommended: Y- will continue to follow while in the acute care setting for strategy reinforcement   Results Reviewed with: patient, RN, and MD   Pt/Family Education: initiated. Pt would benefit from continued education.    General Information;  See initial evaluation for PMH.   Current concerns for dysphagia include changes in respiratory status, s/s of aspiration/distress w/ PO intake, pulmonary concerns.  MBS was recommended to assess oropharyngeal stage swallowing skills at this time.     Prior MBS 5/4/23:  Pt presents with mild oropharyngeal dysphagia characterized by silent aspiration of thin liquids.  Delayed swallow with spillage of all textures in  pharynx. Aspiration of pooled thin liquid material in the pyriforms.  No patient response. Chin tuck, cup v straw sip ineffective in preventing aspiration.  CP prominence without retropulsion.          Pt was viewed sitting upright in the lateral and AP positions 4L NC in place. Due to concerns for patient safety / patient refusal,  trials provided deviated from the MBSImP Validated Protocol. Pt was given 5-mL thin liquid x2, 20-mL cup sip thin, 40-mL sequential swallow thin, 5-mL nectar thick, 20-mL cup sip nectar thick, 5-mL honey thick, and 5-mL pudding, thins by single straw sip .    Initial view observations/comments: Clear view of the upper airway      8-Point Penetration-Aspiration Scale   Thin liquid 1 - Material does not enter the airway   Nectar thick liquid 1 - Material does not enter the airway   Honey thick liquid 1 - Material does not enter the airway   Puree (pudding) 1 - Material does not enter the airway   Solid 1 - Material does not enter the airway     Strategies and Efficacy: Multiple swallows (cued) - clears majority of pharyngeal retention; single/controlled sips     Aspiration Response and Efficacy:  NA    MBS IMP Rating    ORAL Impairment  Compinent 1--Lip Closure  Judged at any point during the swallow.  2 - Escape from interlabial space or lateral juncture; no extension beyond vermilion border    Component 2--Tongue Control During Bolus Hold  Judged on held liquid boluses only and prior to productive tongue movement.   1 - Escape to lateral buccal cavity/floor of mouth (FOM)    Component 3--Bolus Preparation/Mastication  Judged only during presentation of 1/2 shortbread cookie coated in pudding.   NA    Component 4--Bolus Transport/Lingual Motion  Judged after first productive tongue movement for oral bolus transport.  3 - Repetitive/disorganized tongue motion    Component 5--Oral Residue  Judged after first swallow or after the last swallow of the sequential swallow task.  2 - Residue collection on oral structures   Location   C - Tongue    Component 6--Initiation of Pharyngeal Swallow  Judged at first movement of the brisk superior-anterior hyoid trajectory.  3 - Bolus head in pyriforms      PHARYNGEAL Impairment  Component 7--Soft Palate Elevation  Judged during maximum displacement of soft palate.  0 - No bolus  between the soft palate (SP)/pharyngeal wall (PW)    Component 8--Laryngeal Elevation  Judged when epiglottis is in its most horizontal position.  1 - Partial superior movement of thyroid cartilage/partial approximation of arytenoids to epiglottic petiole    Component 9--Anterior Hyoid Excursion  Judged at height of swallow/maximal anterior hyoid displacement.  1 - Partial anterior movement    Component 10--Epiglottic Movement  Judged at height of swallow/maximal anterior hyoid displacement.  0 - Complete inversion    Component 11--Laryngeal Vestibular Closure  Judged at height of swallow/maximal anterior hyoid displacement.  1 - Incomplete; narrow column air/contrast in laryngeal vestibule    Component 12--Pharyngeal Stripping Wave  Judged during the full duration of the pharyngeal swallow.  1 - Present - diminished    Component 13--Pharyngeal Contraction  Judged in AP view at rest and throughout maximum movement of structures.  NA    Component 14--Pharyngoesophageal Segment Opening  Judged during maximum distension of PES and throughout opening and closure.  1 - Partial distension/partial duration; partial obstruction to flow    Component 15--Tongue Base (TB) Retraction  Judged during maximum retraction of the tongue base.  2 - Narrow column of contrast or air between TB and PW    Component 16--Pharyngeal Residue  Judged after first swallow or after the last swallow of the sequential swallow task.  3 - Majority of contrast within or on pharyngeal structures   Location   F - Diffuse (>3 areas)      ESOPHAGEAL Impairment  Component 17--Esophageal Clearance Upright Position  Judged in AP view during bolus transit through the oral cavity to the LES  1 - Esophageal retention         Corine Gastelum MS, CCC-SLP  Speech-Language Pathologist  [place] #[id number]  [place] #[id number]

## 2024-02-29 NOTE — PLAN OF CARE
Problem: Potential for Falls  Goal: Patient will remain free of falls  Description: INTERVENTIONS:  - Educate patient/family on patient safety including physical limitations  - Instruct patient to call for assistance with activity   - Consult OT/PT to assist with strengthening/mobility   - Keep Call bell within reach  - Keep bed low and locked with side rails adjusted as appropriate  - Keep care items and personal belongings within reach  - Initiate and maintain comfort rounds  - Make Fall Risk Sign visible to staff  - Offer Toileting every Hours, in advance of need  - Initiate/Maintain alarm  - Obtain necessary fall risk management equipment:   - Apply yellow socks and bracelet for high fall risk patients  - Consider moving patient to room near nurses station  Outcome: Progressing     Problem: RESPIRATORY - ADULT  Goal: Achieves optimal ventilation and oxygenation  Description: INTERVENTIONS:  - Assess for changes in respiratory status  - Assess for changes in mentation and behavior  - Position to facilitate oxygenation and minimize respiratory effort  - Oxygen administered by appropriate delivery if ordered  - Initiate smoking cessation education as indicated  - Encourage broncho-pulmonary hygiene including cough, deep breathe, Incentive Spirometry  - Assess the need for suctioning and aspirate as needed  - Assess and instruct to report SOB or any respiratory difficulty  - Respiratory Therapy support as indicated  Outcome: Progressing     Problem: Prexisting or High Potential for Compromised Skin Integrity  Goal: Skin integrity is maintained or improved  Description: INTERVENTIONS:  - Identify patients at risk for skin breakdown  - Assess and monitor skin integrity  - Assess and monitor nutrition and hydration status  - Monitor labs   - Assess for incontinence   - Turn and reposition patient  - Assist with mobility/ambulation  - Relieve pressure over bony prominences  - Avoid friction and shearing  - Provide  appropriate hygiene as needed including keeping skin clean and dry  - Evaluate need for skin moisturizer/barrier cream  - Collaborate with interdisciplinary team   - Patient/family teaching  - Consider wound care consult   Outcome: Progressing     Problem: PAIN - ADULT  Goal: Verbalizes/displays adequate comfort level or baseline comfort level  Description: Interventions:  - Encourage patient to monitor pain and request assistance  - Assess pain using appropriate pain scale  - Administer analgesics based on type and severity of pain and evaluate response  - Implement non-pharmacological measures as appropriate and evaluate response  - Consider cultural and social influences on pain and pain management  - Notify physician/advanced practitioner if interventions unsuccessful or patient reports new pain  Outcome: Progressing     Problem: INFECTION - ADULT  Goal: Absence or prevention of progression during hospitalization  Description: INTERVENTIONS:  - Assess and monitor for signs and symptoms of infection  - Monitor lab/diagnostic results  - Monitor all insertion sites, i.e. indwelling lines, tubes, and drains  - Monitor endotracheal if appropriate and nasal secretions for changes in amount and color  - [place] appropriate cooling/warming therapies per order  - Administer medications as ordered  - Instruct and encourage patient and family to use good hand hygiene technique  - Identify and instruct in appropriate isolation precautions for identified infection/condition  Outcome: Progressing  Goal: Absence of fever/infection during neutropenic period  Description: INTERVENTIONS:  - Monitor WBC    Outcome: Progressing     Problem: SAFETY ADULT  Goal: Patient will remain free of falls  Description: INTERVENTIONS:  - Educate patient/family on patient safety including physical limitations  - Instruct patient to call for assistance with activity   - Consult OT/PT to assist with strengthening/mobility   - Keep Call bell  within reach  - Keep bed low and locked with side rails adjusted as appropriate  - Keep care items and personal belongings within reach  - Initiate and maintain comfort rounds  - Make Fall Risk Sign visible to staff  - Offer Toileting every  Hours, in advance of need  - Initiate/Maintainalarm  - Obtain necessary fall risk management equipment:   - Apply yellow socks and bracelet for high fall risk patients  - Consider moving patient to room near nurses station  Outcome: Progressing  Goal: Maintain or return to baseline ADL function  Description: INTERVENTIONS:  -  Assess patient's ability to carry out ADLs; assess patient's baseline for ADL function and identify physical deficits which impact ability to perform ADLs (bathing, care of mouth/teeth, toileting, grooming, dressing, etc.)  - Assess/evaluate cause of self-care deficits   - Assess range of motion  - Assess patient's mobility; develop plan if impaired  - Assess patient's need for assistive devices and provide as appropriate  - Encourage maximum independence but intervene and supervise when necessary  - Involve family in performance of ADLs  - Assess for home care needs following discharge   - Consider OT consult to assist with ADL evaluation and planning for discharge  - Provide patient education as appropriate  Outcome: Progressing  Goal: Maintains/Returns to pre admission functional level  Description: INTERVENTIONS:  - Perform AM-PAC 6 Click Basic Mobility/ Daily Activity assessment daily.  - Set and communicate daily mobility goal to care team and patient/family/caregiver.   - Collaborate with rehabilitation services on mobility goals if consulted  - Perform Range of Motion  times a day.  - Reposition patient every  hours.  - Dangle patient  times a day  - Stand patient times a day  - Ambulate patient  times a day  - Out of bed to chair  times a day   - Out of bed for meals  times a day  - Out of bed for toileting  - Record patient progress and  toleration of activity level   Outcome: Progressing     Problem: DISCHARGE PLANNING  Goal: Discharge to home or other facility with appropriate resources  Description: INTERVENTIONS:  - Identify barriers to discharge w/patient and caregiver  - Arrange for needed discharge resources and transportation as appropriate  - Identify discharge learning needs (meds, wound care, etc.)  - Arrange for interpretive services to assist at discharge as needed  - Refer to Case Management Department for coordinating discharge planning if the patient needs post-hospital services based on physician/advanced practitioner order or complex needs related to functional status, cognitive ability, or social support system  Outcome: Progressing     Problem: Knowledge Deficit  Goal: Patient/family/caregiver demonstrates understanding of disease process, treatment plan, medications, and discharge instructions  Description: Complete learning assessment and assess knowledge base.  Interventions:  - Provide teaching at level of understanding  - Provide teaching via preferred learning methods  Outcome: Progressing     Problem: MOBILITY - ADULT  Goal: Maintain or return to baseline ADL function  Description: INTERVENTIONS:  -  Assess patient's ability to carry out ADLs; assess patient's baseline for ADL function and identify physical deficits which impact ability to perform ADLs (bathing, care of mouth/teeth, toileting, grooming, dressing, etc.)  - Assess/evaluate cause of self-care deficits   - Assess range of motion  - Assess patient's mobility; develop plan if impaired  - Assess patient's need for assistive devices and provide as appropriate  - Encourage maximum independence but intervene and supervise when necessary  - Involve family in performance of ADLs  - Assess for home care needs following discharge   - Consider OT consult to assist with ADL evaluation and planning for discharge  - Provide patient education as appropriate  Outcome:  Progressing  Goal: Maintains/Returns to pre admission functional level  Description: INTERVENTIONS:  - Perform AM-PAC 6 Click Basic Mobility/ Daily Activity assessment daily.  - Set and communicate daily mobility goal to care team and patient/family/caregiver.   - Collaborate with rehabilitation services on mobility goals if consulted  - Perform Range of Motion times a day.  - Reposition patient every  hours.  - Dangle patient  times a day  - Stand patient  times a day  - Ambulate patient times a day  - Out of bed to chair  times a day   - Out of bed for meals  times a day  - Out of bed for toileting  - Record patient progress and toleration of activity level   Outcome: Progressing     Problem: Nutrition/Hydration-ADULT  Goal: Nutrient/Hydration intake appropriate for improving, restoring or maintaining nutritional needs  Description: Monitor and assess patient's nutrition/hydration status for malnutrition. Collaborate with interdisciplinary team and initiate plan and interventions as ordered.  Monitor patient's weight and dietary intake as ordered or per policy. Utilize nutrition screening tool and intervene as necessary. Determine patient's food preferences and provide high-protein, high-caloric foods as appropriate.     INTERVENTIONS:  - Monitor oral intake, urinary output, labs, and treatment plans  - Assess nutrition and hydration status and recommend course of action  - Evaluate amount of meals eaten  - Assist patient with eating if necessary   - Allow adequate time for meals  - Recommend/ encourage appropriate diets, oral nutritional supplements, and vitamin/mineral supplements  - Order, calculate, and assess calorie counts as needed  - Recommend, monitor, and adjust tube feedings and TPN/PPN based on assessed needs  - Assess need for intravenous fluids  - Provide specific nutrition/hydration education as appropriate  - Include patient/family/caregiver in decisions related to nutrition  Outcome: Progressing

## 2024-02-29 NOTE — ASSESSMENT & PLAN NOTE
Currently SpO2: 96 % on Nasal Cannula O2 Flow Rate (L/min): 4 L/min    At baseline, uses 4L NC  CT Imaging showing mild pulm edema, scattered areas of tree-in-bud opacity  Continue to treat underlying NSTEMI, COVID, mild pulm edema  2/23 repeat chest xray Unchanged appearance of mild interstitial edema.Right basilar opacity likely representing small effusion with atelectasis. Please note pneumonia may demonstrate a similar appearance the appropriate clinical setting.  2/26 CXR was generally unchanged from 2/23 CXR. Continue treatment for pneumonia vs COPD exacerbation vs atelectasis. Pt continues to complain of pleuritic right lower chest pain.   Pt continues to complain of right upper abdominal/lower chest wall pain. On exam today found to have a palpable lower liver edge that was tender to palpation. Hepatic function tests and RUQ US were negative for any acute process.   IR was consulted for drainage of right lower lobe fluid accumulation, but decided the accumulation was too small to tap. Will get a repeat chest CT to evaluate effusion as well as broaden antibiotic coverage to IV Unasyn  Repeat Chest CT 2/28 was negative for PE. Showed consolidation vs. Atelectasis in right lower lobe with mucus plugging in bronchi. Pulmonology was consulted, recommend flutter valve and vest therapy as well as continue Unasyn IV, prednisone taper, daily nebs. Pt is not a good candidate for bronchoscopy b/c of her recent NSTEMI, triple vessel disease, HFreF. Superimoposed bacterial pneumonia vs. Aspiration pneumonia. Will order a swallow study to further evaluate. Procalcitonin still elevated this morning at 0.56, down from 0.92 yesterday. WBC down trending at 10.17 today from 15.34 yesterday.  2/29: Oxygen wean down to baseline 4L NC, pt says her right lower chest pain is much improved compared to yesterday, breathing better on exam. Pt getting swallow study today

## 2024-02-29 NOTE — PROGRESS NOTES
[facility]  Progress Note  Name: Lauren Quintero I  MRN: [id number]  Unit/Bed#: -01 I Date of Admission: 2/15/2024   Date of Service: 2/29/2024 I Hospital Day: 14    Assessment/Plan   CKD (chronic kidney disease)  Assessment & Plan  Lab Results   Component Value Date    EGFR 63 02/29/2024    EGFR 57 02/28/2024    EGFR 69 02/27/2024    CREATININE 0.88 02/29/2024    CREATININE 0.96 02/28/2024    CREATININE 0.82 02/27/2024     Pt has a history of CKD stage 2  Transitioned to po lasix 20 mg daily,   Cont to monitor bmp    COVID-19  Assessment & Plan  Lab Results   Component Value Date    SARSCOV2 Positive (A) 02/15/2024    SARSCOV2 Negative 12/12/2023    SARSCOV2 Negative 10/28/2023       Symptoms of COVID-19 SYMPTOMS: shortness of breath and Tachycardia (more than 100 beats per min)  Vaccine status: vaccinated  The patient does have acute hypoxemic respiratory failure given the same    CT chest w contrast showing Scattered areas of tree-in-bud opacity consistent with endobronchial impaction/infection  S/p remdesevir, IV steroids  Status post baricitinib  Continue to monitor    COPD with exacerbation (HCC)  Assessment & Plan  Worsening Sob, wheezing, increased o2 requirements as above on admission  Continue prednisone taper    Major depressive disorder, recurrent episode, moderate (HCC)  Assessment & Plan  Continue wellbutrin, vilazodone  Also takes Vraylar; recommend bringing from home as we do not have it on formulary - patient now has it     Acute on chronic respiratory failure with hypoxia (HCC)  Assessment & Plan  Currently SpO2: 96 % on Nasal Cannula O2 Flow Rate (L/min): 4 L/min    At baseline, uses 4L NC  CT Imaging showing mild pulm edema, scattered areas of tree-in-bud opacity  Continue to treat underlying NSTEMI, COVID, mild pulm edema  2/23 repeat chest xray Unchanged appearance of mild interstitial edema.Right basilar opacity likely representing small effusion with atelectasis.  Please note pneumonia may demonstrate a similar appearance the appropriate clinical setting.  2/26 CXR was generally unchanged from 2/23 CXR. Continue treatment for pneumonia vs COPD exacerbation vs atelectasis. Pt continues to complain of pleuritic right lower chest pain.   Pt continues to complain of right upper abdominal/lower chest wall pain. On exam today found to have a palpable lower liver edge that was tender to palpation. Hepatic function tests and RUQ US were negative for any acute process.   IR was consulted for drainage of right lower lobe fluid accumulation, but decided the accumulation was too small to tap. Will get a repeat chest CT to evaluate effusion as well as broaden antibiotic coverage to IV Unasyn  Repeat Chest CT 2/28 was negative for PE. Showed consolidation vs. Atelectasis in right lower lobe with mucus plugging in bronchi. Pulmonology was consulted, recommend flutter valve and vest therapy as well as continue Unasyn IV, prednisone taper, daily nebs. Pt is not a good candidate for bronchoscopy b/c of her recent NSTEMI, triple vessel disease, HFreF. Superimoposed bacterial pneumonia vs. Aspiration pneumonia.   Patient was seen by speech-language pathology recommended dysphagia diet lvl 3 dental soft  Continue IV Unasyn  2/29: Oxygen wean down to baseline 4L NC, pt says her right lower chest pain is much improved compared to yesterday, breathing better on exam. Pt getting swallow study today      Essential hypertension  Assessment & Plan  Continue home meds; initiate lisinopril as part of NSTEMI management  Continue carvedilol  PRN IV Labetalol for SBP > 160  Resume lasix 20 mg po daily  Continue to monitor blood pressure          * NSTEMI (non-ST elevated myocardial infarction) ([facility])  Assessment & Plan  Lab Results   Component Value Date    HSTNI0 2,584 (H) 02/17/2024    HSTNI2 2,049 (H) 02/17/2024    HSTNID2 -535 02/17/2024      CT chest showing no evidence of pulmonary embolism, did note  mild pulmonary edema  No chest pain present, but significant SOB, anginal equivalent, sudden onset, fairly elevated troponins  EKG: Sinus Tachycardia with no obvious STEMI  Suspect a true type I NSTEMI  ADRIEN Score: 5  The patient's presentation with shortness of breath was somewhat sudden according to the son, he was driving her home from the rehab facility if she got acutely short of breath and hypoxemic again.  Certainly it is possible that the COVID infection may have triggered some degree of underlying CAD leading to an NSTEMI.  Can stop heparin drip  Cardiology following.  Cardiac cath showed significant triple vessel disease with 90% stenosis in the mid LAD, 80% stenosis in the proximal LAD, 85% stenosis in the mid Cx, and 70% in the RCA with a patent mid RCA stent. Cardiology recommends maximizing medical therapy for her CAD and follow-up outpatient with a CT surgeon for a potential CABG once she fully recovers from COVID.  Given new reduced EF of 35%, Lifevest has been fitted today                 VTE Pharmacologic Prophylaxis: VTE Score: 7 High Risk (Score >/= 5) - Pharmacological DVT Prophylaxis Ordered: enoxaparin (Lovenox). Sequential Compression Devices Ordered.    Mobility:   Basic Mobility Inpatient Raw Score: 13  -HLM Goal: 4: Move to chair/commode  -HLM Achieved: 1: Laying in bed  HLM Goal NOT achieved. Continue with multidisciplinary rounding and encourage appropriate mobility to improve upon HLM goals.    Patient Centered Rounds: I performed bedside rounds with nursing staff today.  Discussions with Specialists or Other Care Team Provider:  IP CONSULT TO CARDIOLOGY  IP CONSULT TO PULMONOLOGY      Education and Discussions with Family / Patient:   Discussed with son in detail, he is pretty overwhelmed about his mother's condition, he states that he is not able to take care of her anymore and he strongly thinks that she will should benefit from a facility.    Current Length of Stay: 14  day(s)  Current Patient Status: Inpatient   Certification Statement: The patient will continue to require additional inpatient hospital stay due to plan as noted above  Discharge Plan: Anticipate discharge in 24-48 hrs to rehab facility.    Code Status: Level 3 - DNAR and DNI    Subjective:   Pt is resting comfortably in bed. She is breathing easier and is back at baseline O2 requirements. She says her pleuritic right sided chest pain is much improved compared to yesterday and has been using the flutter valve and incentive spirometry. Denies any fever, CP, SOB, cough, abd pain. All questions answered. She still doesn't feel comfortable being d/c but is improving.      Objective:     Vitals:   Temp (24hrs), Av.8 °F (36.6 °C), Min:97.4 °F (36.3 °C), Max:98 °F (36.7 °C)    Temp:  [97.4 °F (36.3 °C)-98 °F (36.7 °C)] 97.4 °F (36.3 °C)  HR:  [80-88] 88  Resp:  [16-20] 19  BP: (120-149)/(58-65) 133/59  SpO2:  [91 %-97 %] 96 %  Body mass index is 20.07 kg/m².     Input and Output Summary (last 24 hours):   No intake or output data in the 24 hours ending 24 1022    Physical Exam:   Physical Exam  Constitutional:       General: She is not in acute distress.     Appearance: She is not ill-appearing or toxic-appearing.   HENT:      Head: Normocephalic and atraumatic.      Right Ear: External ear normal.      Left Ear: External ear normal.      Nose: Nose normal.      Mouth/Throat:      Mouth: Mucous membranes are moist.      Pharynx: Oropharynx is clear.   Eyes:      Extraocular Movements: Extraocular movements intact.      Conjunctiva/sclera: Conjunctivae normal.   Cardiovascular:      Rate and Rhythm: Normal rate and regular rhythm.      Pulses: Normal pulses.      Heart sounds: Normal heart sounds. No murmur heard.  Pulmonary:      Effort: Pulmonary effort is normal.      Breath sounds: Rhonchi present.   Chest:      Chest wall: No tenderness.   Abdominal:      General: Abdomen is flat. Bowel sounds are normal.       Palpations: Abdomen is soft.      Tenderness: There is no abdominal tenderness.   Musculoskeletal:      Right lower leg: No edema.      Left lower leg: No edema.   Skin:     General: Skin is warm and dry.      Capillary Refill: Capillary refill takes less than 2 seconds.   Neurological:      General: No focal deficit present.      Mental Status: She is alert and oriented to person, place, and time.            Additional Data:     Labs:  Results from last 7 days   Lab Units 02/29/24  0624   WBC Thousand/uL 10.17*   HEMOGLOBIN g/dL 11.0*   HEMATOCRIT % 34.9   PLATELETS Thousands/uL 216   NEUTROS PCT % 82*   LYMPHS PCT % 10*   MONOS PCT % 7   EOS PCT % 0     Results from last 7 days   Lab Units 02/29/24  0624   SODIUM mmol/L 142   POTASSIUM mmol/L 4.4   CHLORIDE mmol/L 105   CO2 mmol/L 30   BUN mg/dL 32*   CREATININE mg/dL 0.88   ANION GAP mmol/L 7   CALCIUM mg/dL 9.0   ALBUMIN g/dL 3.1*   TOTAL BILIRUBIN mg/dL 0.36   ALK PHOS U/L 69   ALT U/L 6*   AST U/L 12*   GLUCOSE RANDOM mg/dL 105                 Results from last 7 days   Lab Units 02/29/24  0624 02/28/24  0517 02/26/24  0438 02/25/24  0524   PROCALCITONIN ng/ml 0.56* 0.92* 0.24 0.30*       Lines/Drains:  Invasive Devices       Peripheral Intravenous Line  Duration             Peripheral IV 02/23/24 Right;Ventral (anterior) Forearm 5 days    Peripheral IV 02/28/24 Right Antecubital 1 day                      Telemetry:  Telemetry Orders (From admission, onward)               24 Hour Telemetry Monitoring  Continuous x 24 Hours (Telem)        Expiring   Question:  Reason for 24 Hour Telemetry  Answer:  Arrhythmias requiring acute medical intervention / PPM or ICD malfunction                     Telemetry Reviewed: Normal Sinus Rhythm  Indication for Continued Telemetry Use: Acute MI/Unstable Angina/Rule out ACS             Imaging: Reviewed radiology reports from this admission including:   XR chest 1 view portable    Result Date: 2/16/2024  Impression: Mild  pulmonary interstitial edema. Workstation performed: [id number]     CT chest with contrast    Result Date: 2/15/2024  Impression: No evidence for pulmonary embolism. Mild pulmonary edema. Scattered areas of tree-in-bud opacity consistent with endobronchial impaction/infection. Workstation performed: [id number]       No Chest XR results available for this patient.     Results for orders placed during the hospital encounter of 02/15/24    Echo complete w/ contrast if indicated    Interpretation Summary    Left Ventricle: Left ventricular cavity size is normal. Wall thickness is mildly increased. There is mild asymmetric hypertrophy of the basal septal wall. The left ventricular ejection fraction is 35%. Systolic function is severely reduced. Diastolic function is mildly abnormal, consistent with grade I (abnormal) relaxation.    The following segments are hypokinetic: mid anteroseptal, mid inferoseptal, apical anterior, apical septal, apical inferior and apex.    Mitral Valve: There is severe annular calcification.    Tricuspid Valve: There is mild regurgitation.    IVC/SVC: The inferior vena cava is dilated.    Pulmonary Artery: The estimated pulmonary artery systolic pressure is 40.0 mmHg. The pulmonary artery systolic pressure is mildly increased.      Recent Cultures (last 7 days):   Results from last 7 days   Lab Units 02/28/24  1342   LEGIONELLA URINARY ANTIGEN  Negative       Last 24 Hours Medication List:   Current Facility-Administered Medications   Medication Dose Route Frequency Provider Last Rate    acetaminophen  975 mg Oral Q8H PRN Vasu Ramos PA-C      albuterol  2 puff Inhalation Q4H PRN Vasu Ramos PA-C      albuterol  2.5 mg Nebulization Q4H PRN Vasu Ramos PA-C      aluminum-magnesium hydroxide-simethicone  30 mL Oral Q6H PRN Vasu Ramos PA-C      ampicillin-sulbactam  3 g Intravenous Q6H Kenny Stone MD 3 g (02/29/24 0828)    aspirin  81 mg Oral  Daily Vasu Ramos PA-C      baricitinib  2 mg Oral Q24H Vasu Ramos PA-C      budesonide-formoterol  2 puff Inhalation BID Vasu Ramos PA-C      buPROPion  75 mg Oral Daily Vasu Ramos PA-C      carvedilol  25 mg Oral BID With Meals Vasu Ramos PA-C      docusate sodium  100 mg Oral BID Vasu Ramos PA-C      furosemide  20 mg Oral Daily Alcides Rutherford MD      guaiFENesin  1,200 mg Oral Q12H ROSELINE Latia Dominique Sophia, DO      heparin (porcine)  5,000 Units Subcutaneous Q8H [facility] Alcides Rutherford MD      ipratropium  0.5 mg Nebulization TID Herve Jerez PA-C      labetalol  10 mg Intravenous Q6H PRN Vasu Ramos PA-C      levalbuterol  1.25 mg Nebulization TID Herve Jerez PA-C      lidocaine  2 patch Topical Daily PRN Vasu Ramos PA-C      LORazepam  0.5 mg Oral BID PRN Latia Dominiquelizeth Cortes DO      melatonin  3 mg Oral HS PRN Vasu Ramos PA-C      midodrine  2.5 mg Oral TID AC Vasu Ramos PA-C      morphine injection  2 mg Intravenous Q6H PRN Kenny Stone MD      nitroglycerin  0.4 mg Sublingual Q5 Min PRN Vasu Ramos PA-C      ondansetron  4 mg Intravenous Q6H PRN Vasu Ramos PA-C      pantoprazole  40 mg Oral Daily Vasu Ramos PA-C      pravastatin  80 mg Oral QPM Vasu Ramos PA-C      predniSONE  30 mg Oral Daily Herve Jerez PA-C      sodium chloride  4 mL Nebulization TID Herve Jerez PA-C      vilazodone  40 mg Oral Daily With Breakfast Vasu Ramos PA-C      Zanubrutinib  160 mg Oral BID Vasu Ramos PA-C          Today, Patient Was Seen By: Ander Duarte and the attending physician, Kenny Stone,*        **Please Note: This note may have been constructed using a voice recognition system.**

## 2024-03-01 ENCOUNTER — PATIENT OUTREACH (OUTPATIENT)
Dept: CASE MANAGEMENT | Facility: OTHER | Age: 79
End: 2024-03-01

## 2024-03-01 PROBLEM — R79.89 ELEVATED TROPONIN: Status: ACTIVE | Noted: 2024-03-01

## 2024-03-01 LAB
ANION GAP SERPL CALCULATED.3IONS-SCNC: 7 MMOL/L
BASOPHILS # BLD AUTO: 0.03 THOUSANDS/ÂΜL (ref 0–0.1)
BASOPHILS NFR BLD AUTO: 0 % (ref 0–1)
BUN SERPL-MCNC: 29 MG/DL (ref 5–25)
CALCIUM SERPL-MCNC: 8.5 MG/DL (ref 8.4–10.2)
CHLORIDE SERPL-SCNC: 104 MMOL/L (ref 96–108)
CO2 SERPL-SCNC: 32 MMOL/L (ref 21–32)
CREAT SERPL-MCNC: 0.91 MG/DL (ref 0.6–1.3)
EOSINOPHIL # BLD AUTO: 0.01 THOUSAND/ÂΜL (ref 0–0.61)
EOSINOPHIL NFR BLD AUTO: 0 % (ref 0–6)
ERYTHROCYTE [DISTWIDTH] IN BLOOD BY AUTOMATED COUNT: 13.2 % (ref 11.6–15.1)
GFR SERPL CREATININE-BSD FRML MDRD: 61 ML/MIN/1.73SQ M
GLUCOSE SERPL-MCNC: 128 MG/DL (ref 65–140)
HCT VFR BLD AUTO: 33.1 % (ref 34.8–46.1)
HGB BLD-MCNC: 10.1 G/DL (ref 11.5–15.4)
IMM GRANULOCYTES # BLD AUTO: 0.17 THOUSAND/UL (ref 0–0.2)
IMM GRANULOCYTES NFR BLD AUTO: 2 % (ref 0–2)
LYMPHOCYTES # BLD AUTO: 1.05 THOUSANDS/ÂΜL (ref 0.6–4.47)
LYMPHOCYTES NFR BLD AUTO: 12 % (ref 14–44)
MCH RBC QN AUTO: 29.8 PG (ref 26.8–34.3)
MCHC RBC AUTO-ENTMCNC: 30.5 G/DL (ref 31.4–37.4)
MCV RBC AUTO: 98 FL (ref 82–98)
MONOCYTES # BLD AUTO: 0.67 THOUSAND/ÂΜL (ref 0.17–1.22)
MONOCYTES NFR BLD AUTO: 8 % (ref 4–12)
NEUTROPHILS # BLD AUTO: 6.89 THOUSANDS/ÂΜL (ref 1.85–7.62)
NEUTS SEG NFR BLD AUTO: 78 % (ref 43–75)
NRBC BLD AUTO-RTO: 0 /100 WBCS
PLATELET # BLD AUTO: 212 THOUSANDS/UL (ref 149–390)
PMV BLD AUTO: 11.7 FL (ref 8.9–12.7)
POTASSIUM SERPL-SCNC: 3.8 MMOL/L (ref 3.5–5.3)
RBC # BLD AUTO: 3.39 MILLION/UL (ref 3.81–5.12)
SODIUM SERPL-SCNC: 143 MMOL/L (ref 135–147)
WBC # BLD AUTO: 8.82 THOUSAND/UL (ref 4.31–10.16)

## 2024-03-01 PROCEDURE — 97163 PT EVAL HIGH COMPLEX 45 MIN: CPT

## 2024-03-01 PROCEDURE — 99232 SBSQ HOSP IP/OBS MODERATE 35: CPT | Performed by: INTERNAL MEDICINE

## 2024-03-01 PROCEDURE — 85025 COMPLETE CBC W/AUTO DIFF WBC: CPT | Performed by: INTERNAL MEDICINE

## 2024-03-01 PROCEDURE — 94668 MNPJ CHEST WALL SBSQ: CPT

## 2024-03-01 PROCEDURE — 97167 OT EVAL HIGH COMPLEX 60 MIN: CPT

## 2024-03-01 PROCEDURE — 94760 N-INVAS EAR/PLS OXIMETRY 1: CPT

## 2024-03-01 PROCEDURE — 80048 BASIC METABOLIC PNL TOTAL CA: CPT | Performed by: INTERNAL MEDICINE

## 2024-03-01 PROCEDURE — 94640 AIRWAY INHALATION TREATMENT: CPT

## 2024-03-01 PROCEDURE — 99233 SBSQ HOSP IP/OBS HIGH 50: CPT | Performed by: INTERNAL MEDICINE

## 2024-03-01 PROCEDURE — 92526 ORAL FUNCTION THERAPY: CPT

## 2024-03-01 RX ADMIN — IPRATROPIUM BROMIDE 0.5 MG: 0.5 SOLUTION RESPIRATORY (INHALATION) at 19:39

## 2024-03-01 RX ADMIN — LEVALBUTEROL HYDROCHLORIDE 1.25 MG: 1.25 SOLUTION RESPIRATORY (INHALATION) at 07:23

## 2024-03-01 RX ADMIN — DOCUSATE SODIUM 100 MG: 100 CAPSULE, LIQUID FILLED ORAL at 17:23

## 2024-03-01 RX ADMIN — CARVEDILOL 25 MG: 12.5 TABLET, FILM COATED ORAL at 17:23

## 2024-03-01 RX ADMIN — BUPROPION HYDROCHLORIDE 75 MG: 75 TABLET, FILM COATED ORAL at 08:30

## 2024-03-01 RX ADMIN — GUAIFENESIN 1200 MG: 600 TABLET ORAL at 08:30

## 2024-03-01 RX ADMIN — MORPHINE SULFATE 2 MG: 2 INJECTION, SOLUTION INTRAMUSCULAR; INTRAVENOUS at 20:28

## 2024-03-01 RX ADMIN — PRAVASTATIN SODIUM 80 MG: 80 TABLET ORAL at 17:23

## 2024-03-01 RX ADMIN — HEPARIN SODIUM 5000 UNITS: 5000 INJECTION INTRAVENOUS; SUBCUTANEOUS at 21:05

## 2024-03-01 RX ADMIN — HEPARIN SODIUM 5000 UNITS: 5000 INJECTION INTRAVENOUS; SUBCUTANEOUS at 14:50

## 2024-03-01 RX ADMIN — AMPICILLIN SODIUM AND SULBACTAM SODIUM 3 G: 100; 50 INJECTION, POWDER, FOR SOLUTION INTRAVENOUS at 08:23

## 2024-03-01 RX ADMIN — SODIUM CHLORIDE SOLN NEBU 3% 4 ML: 3 NEBU SOLN at 07:23

## 2024-03-01 RX ADMIN — VILAZODONE HYDROCHLORIDE 40 MG: 20 TABLET ORAL at 08:39

## 2024-03-01 RX ADMIN — IPRATROPIUM BROMIDE 0.5 MG: 0.5 SOLUTION RESPIRATORY (INHALATION) at 14:14

## 2024-03-01 RX ADMIN — MIDODRINE HYDROCHLORIDE 2.5 MG: 5 TABLET ORAL at 17:23

## 2024-03-01 RX ADMIN — SODIUM CHLORIDE SOLN NEBU 3% 4 ML: 3 NEBU SOLN at 19:40

## 2024-03-01 RX ADMIN — MIDODRINE HYDROCHLORIDE 2.5 MG: 5 TABLET ORAL at 06:25

## 2024-03-01 RX ADMIN — GUAIFENESIN 1200 MG: 600 TABLET ORAL at 20:29

## 2024-03-01 RX ADMIN — AMPICILLIN SODIUM AND SULBACTAM SODIUM 3 G: 100; 50 INJECTION, POWDER, FOR SOLUTION INTRAVENOUS at 14:50

## 2024-03-01 RX ADMIN — DOCUSATE SODIUM 100 MG: 100 CAPSULE, LIQUID FILLED ORAL at 08:31

## 2024-03-01 RX ADMIN — MIDODRINE HYDROCHLORIDE 2.5 MG: 5 TABLET ORAL at 11:56

## 2024-03-01 RX ADMIN — Medication 3 MG: at 20:30

## 2024-03-01 RX ADMIN — ZANUBRUTINIB 160 MG: 80 CAPSULE, GELATIN COATED ORAL at 08:36

## 2024-03-01 RX ADMIN — ALBUTEROL SULFATE 2.5 MG: 2.5 SOLUTION RESPIRATORY (INHALATION) at 10:59

## 2024-03-01 RX ADMIN — FUROSEMIDE 20 MG: 20 TABLET ORAL at 08:31

## 2024-03-01 RX ADMIN — HEPARIN SODIUM 5000 UNITS: 5000 INJECTION INTRAVENOUS; SUBCUTANEOUS at 06:25

## 2024-03-01 RX ADMIN — MORPHINE SULFATE 2 MG: 2 INJECTION, SOLUTION INTRAMUSCULAR; INTRAVENOUS at 06:25

## 2024-03-01 RX ADMIN — IPRATROPIUM BROMIDE 0.5 MG: 0.5 SOLUTION RESPIRATORY (INHALATION) at 07:23

## 2024-03-01 RX ADMIN — PREDNISONE 30 MG: 20 TABLET ORAL at 08:31

## 2024-03-01 RX ADMIN — BUDESONIDE AND FORMOTEROL FUMARATE DIHYDRATE 2 PUFF: 160; 4.5 AEROSOL RESPIRATORY (INHALATION) at 17:24

## 2024-03-01 RX ADMIN — BUDESONIDE AND FORMOTEROL FUMARATE DIHYDRATE 2 PUFF: 160; 4.5 AEROSOL RESPIRATORY (INHALATION) at 08:37

## 2024-03-01 RX ADMIN — CARVEDILOL 25 MG: 12.5 TABLET, FILM COATED ORAL at 08:31

## 2024-03-01 RX ADMIN — LEVALBUTEROL HYDROCHLORIDE 1.25 MG: 1.25 SOLUTION RESPIRATORY (INHALATION) at 19:40

## 2024-03-01 RX ADMIN — ZANUBRUTINIB 160 MG: 80 CAPSULE, GELATIN COATED ORAL at 17:24

## 2024-03-01 RX ADMIN — PANTOPRAZOLE SODIUM 40 MG: 40 TABLET, DELAYED RELEASE ORAL at 08:30

## 2024-03-01 RX ADMIN — AMPICILLIN SODIUM AND SULBACTAM SODIUM 3 G: 100; 50 INJECTION, POWDER, FOR SOLUTION INTRAVENOUS at 20:29

## 2024-03-01 RX ADMIN — AMPICILLIN SODIUM AND SULBACTAM SODIUM 3 G: 100; 50 INJECTION, POWDER, FOR SOLUTION INTRAVENOUS at 02:48

## 2024-03-01 RX ADMIN — SODIUM CHLORIDE SOLN NEBU 3% 4 ML: 3 NEBU SOLN at 14:14

## 2024-03-01 RX ADMIN — LEVALBUTEROL HYDROCHLORIDE 1.25 MG: 1.25 SOLUTION RESPIRATORY (INHALATION) at 14:14

## 2024-03-01 RX ADMIN — ASPIRIN 81 MG: 81 TABLET, COATED ORAL at 08:31

## 2024-03-01 RX ADMIN — ACETAMINOPHEN 975 MG: 325 TABLET, FILM COATED ORAL at 18:27

## 2024-03-01 NOTE — ASSESSMENT & PLAN NOTE
Lab Results   Component Value Date    SARSCOV2 Positive (A) 02/15/2024    SARSCOV2 Negative 12/12/2023    SARSCOV2 Negative 10/28/2023       Symptoms of COVID-19 SYMPTOMS: shortness of breath and Tachycardia (more than 100 beats per min)  Vaccine status: vaccinated  The patient does have acute hypoxemic respiratory failure given the same    CT chest w contrast showing Scattered areas of tree-in-bud opacity consistent with endobronchial impaction/infection  S/p remdesevir, IV steroids  Finished baricitinib  Continue to monitor

## 2024-03-01 NOTE — PHYSICAL THERAPY NOTE
Physical Therapy Evaluation     Patient's Name: Lauren Quintero    Admitting Diagnosis  Dyspnea [R06.00]  SOB (shortness of breath) [R06.02]  NSTEMI (non-ST elevated myocardial infarction) ([facility]) [I21.4]  COVID-19 [U07.1]    Problem List  Patient Active Problem List   Diagnosis    Ambulatory dysfunction    Hypokalemia    Essential hypertension    Tobacco abuse    Acute on chronic respiratory failure with hypoxia (HCC)    CAD (coronary artery disease), native coronary artery    Flank pain    Major depressive disorder, recurrent episode, moderate (HCC)    Fatigue    COPD with exacerbation (HCC)    Anemia    Abnormal computed tomography angiography (CTA)    Severe protein-calorie malnutrition (HCC)    Positive blood culture    Waldenstrom macroglobulinemia (HCC)    Generalized weakness    Staring episodes    BRBPR (bright red blood per rectum)    Moderate protein-calorie malnutrition (HCC)    Diverticulitis    Acute on chronic respiratory failure with hypoxia and hypercapnia (HCC)    Social problem    Dizziness    NSTEMI (non-ST elevated myocardial infarction) ([facility])    COVID-19    CKD (chronic kidney disease)       Past Medical History  Past Medical History:   Diagnosis Date    Acute congestive heart failure (HCC) 8/27/2021    Anxiety     Cardiac disease     Chest pain 8/27/2021    Chronic pain disorder     COPD (chronic obstructive pulmonary disease) (HCC)     Depression     Heart disease     Hyperlipidemia     Hypertension     MI (myocardial infarction) (HCC)     MRSA (methicillin resistant Staphylococcus aureus)     Renal disorder     benign kidney tumor       Past Surgical History  Past Surgical History:   Procedure Laterality Date    APPENDECTOMY      CARDIAC CATHETERIZATION N/A 2/21/2024    Procedure: Cardiac catheterization;  Surgeon: Luke Malagon MD;  Location: [facility];  Service: Cardiology    CARDIAC CATHETERIZATION N/A 2/21/2024    Procedure: Cardiac Coronary Angiogram;  Surgeon: Luke REBOLLAR  "MD Manas;  Location: [facility];  Service: Cardiology    CARDIAC CATHETERIZATION Left 2/21/2024    Procedure: Cardiac Left Heart Cath;  Surgeon: Luke Malagon MD;  Location: [facility];  Service: Cardiology    ELBOW BURSA SURGERY Left 02/2018    D/T MRSA INFECTION    IR THORACENTESIS  2/27/2024    SPINAL FUSION      L7 L9          03/01/24 1046   PT Last Visit   PT Visit Date 03/01/24   Note Type   Note type Evaluation   Pain Assessment   Pain Assessment Tool 0-10   Pain Score 9   Pain Location/Orientation Location: Back   Restrictions/Precautions   Weight Bearing Precautions Per Order No   Other Precautions O2;Fall Risk;Pain;Telemetry  (4L O2 NC)   Home Living   Type of Home House   Home Layout One level;Stairs to enter with rails  (4 ADELINA)   Bathroom Shower/Tub Tub/shower unit   Bathroom Toilet Standard   Bathroom Equipment Grab bars in shower;Grab bars around toilet;Tub transfer bench   Bathroom Accessibility Accessible;Accessible via walker   Home Equipment Walker  (Pt. ambulates short distances at baseline with rollator)   Additional Comments pt presents here from [facility], pt reports she doesn't like it there   Prior Function   Level of [place] Independent with ADLs;Independent with functional mobility;Needs assistance with IADLS   Lives With Son   Receives Help From Family   IADLs Family/Friend/Other provides transportation;Family/Friend/Other provides meals;Family/Friend/Other provides medication management   Falls in the last 6 months 1 to 4  (\"1 bad one\")   Vocational Retired   General   Family/Caregiver Present No   Cognition   Arousal/Participation Alert   Orientation Level Oriented X4   Following Commands Follows one step commands without difficulty   Comments pt agreeable to PT evaluation with encouragement   RLE Assessment   RLE Assessment   (grossly 3+/5 observed with functional mobility)   LLE Assessment   LLE Assessment   (grossly 3+/5 observed with " functional mobility)   Coordination   Movements are Fluid and Coordinated 1   Sensation WFL   Bed Mobility   Supine to Sit 4  Minimal assistance   Additional items Assist x 1;HOB elevated;Bedrails;Increased time required;Verbal cues   Transfers   Sit to Stand 4  Minimal assistance   Additional items Assist x 1;Armrests;Increased time required;Verbal cues   Stand to Sit 4  Minimal assistance   Additional items Assist x 1;Armrests;Increased time required;Verbal cues   Additional Comments pt requesting to remain seated at EOB post session, all needs in reach, bed alarm on   Ambulation/Elevation   Gait pattern   (deferred by pt at this time 2* SOB/FISHMAN)   Balance   Static Sitting Fair   Dynamic Sitting Fair -   Static Standing Poor +   Endurance Deficit   Endurance Deficit Yes   Activity Tolerance   Activity Tolerance Patient limited by fatigue   Medical Staff Made Aware Pt seen as a co-eval with OT due to the patient's co-morbidities, clinically unstable presentation, and present impairments which are a regression from the patient's baseline.   Nurse Made Aware YASIR Brumfield   Assessment   Prognosis Good   Problem List Decreased strength;Decreased endurance;Impaired balance;Decreased mobility;Pain   Assessment Pt is 78 y.o. female seen for PT evaluation on 3/1/2024 s/p admit to [facility] on 2/15/2024 w/ NSTEMI (non-ST elevated myocardial infarction) (HCC). PT was consulted to assess pt's functional mobility and d/c needs. Order placed for PT eval and tx. PTA, pt resides in 1SH with 4 ADELINA, ambulates with rollator, + fall history. At time of eval, pt requiring min A x1 for bed mobility and transfers. Upon evaluation, pt presenting with impaired functional mobility d/t decreased strength, decreased endurance, impaired balance, decreased mobility, pain, and activity intolerance. Pertinent PMHx and current co-morbidities affecting pt's physical performance at time of assessment include: COPD, HTN, tobacco abuse,  chronic respiratory failure with hypoxia, CAD, major depressive disorder, anemia, BRBPR, moderate protein calorie malnutrition, ambulatory dysfunction, hypokalemia, sepsis, SIRS, generalized weakness. Personal factors affecting pt at time of eval include: inability to ambulate household distances, inability to navigate level surfaces w/o external assistance, and positive fall history. The following objective measures performed on IE also reveal limitations: Barthel Index: 25/100, Modified Dennis: 4 (moderate/severe disability), and AM-PAC 6-Clicks: 14/24. Pt's clinical presentation is currently unstable/unpredictable seen in pt's presentation of abnormal lab value(s) and ongoing medical assessment. Overall, pt's rehab potential and prognosis to return to PLOF is good as impacted by objective findings, warranting pt to receive further skilled PT interventions to address identified impairments, activity limitation(s), and participation restriction(s). Pt to benefit from continued PT tx to address deficits as defined above and maximize level of functional independent mobility. From PT/mobility standpoint, recommend level 2, moderate resource intensity in order to facilitate return to PLOF.   Barriers to Discharge Inaccessible home environment;Decreased caregiver support   Goals   Patient Goals to go back to her home   STG Expiration Date 03/11/24   Short Term Goal #1 In 7-10 days: Increase bilateral LE strength 1/2 grade to facilitate independent mobility, Perform all bed mobility tasks modified independent to decrease caregiver burden, Perform all transfers modified independent to improve independence, Ambulate > 50 ft. with least restrictive assistive device with close S w/o LOB and w/ normalized gait pattern 100% of the time, Navigate 4 stairs with close S with unilateral handrail to facilitate return to previous living environment, Increase all balance 1/2 grade to decrease risk for falls, Improve Barthel Index  score to 40 or greater to facilitate independence, and PT provider will perform functional balance assessment to determine fall risk   PT Treatment Day 0   Plan   Treatment/Interventions Functional transfer training;LE strengthening/ROM;Therapeutic exercise;Endurance training;Patient/family training;Equipment eval/education;Bed mobility;Continued evaluation;Spoke to nursing;OT;Elevations;Gait training   PT Frequency 3-5x/wk   Discharge Recommendation   Rehab Resource Intensity Level, PT II (Moderate Resource Intensity)   Equipment Recommended Walker  (RW)   AM-PAC Basic Mobility Inpatient   Turning in Flat Bed Without Bedrails 3   Lying on Back to Sitting on Edge of Flat Bed Without Bedrails 3   Moving Bed to Chair 2   Standing Up From Chair Using Arms 3   Walk in Room 2   Climb 3-5 Stairs With Railing 1   Basic Mobility Inpatient Raw Score 14   Basic Mobility Standardized Score 35.55   Highest Level Of Mobility   -HL Goal 4: Move to chair/commode   -HLM Achieved 5: Stand (1 or more minutes)   Modified Paxton Scale   Modified Paxton Scale 4   Barthel Index   Feeding 10   Bathing 0   Grooming Score 0   Dressing Score 5   Bladder Score 0   Bowels Score 0   Toilet Use Score 5   Transfers (Bed/Chair) Score 5   Mobility (Level Surface) Score 0   Stairs Score 0   Barthel Index Score 25         Amanda Murphy, PT, DPT

## 2024-03-01 NOTE — PLAN OF CARE
Problem: OCCUPATIONAL THERAPY ADULT  Goal: Performs self-care activities at highest level of function for planned discharge setting.  See evaluation for individualized goals.  Description: Treatment Interventions: ADL retraining, Functional transfer training, UE strengthening/ROM, Endurance training, Patient/family training, Equipment evaluation/education, Compensatory technique education, Activityengagement          See flowsheet documentation for full assessment, interventions and recommendations.   Note: Limitation: Decreased ADL status, Decreased UE strength, Decreased endurance, Decreased self-care trans, Decreased high-level ADLs  Prognosis: Good  Assessment: Patient is a 78 y.o. female seen for OT evaluation s/p admit to [facility]  on 2/15/2024 w/NSTEMI (non-ST elevated myocardial infarction) (HCC). Commorbidities affecting patient's functional performance at time of assessment include: HTN, acute on chronic respiratory failure, MDD, COPD, . Orders placed for OT evaluation and treatment and activity as tolerated . Performed at least two patient identifiers during session including name and wristband.  Prior to admission, Patient reporting being independent with ADLs, ambulatory with rollator, and lives with son. Pt has been residing at [facility] for STR PTA. Personal factors affecting patient at time of initial evaluation include: steps to enter, difficulty performing ADLs, and difficulty performing IADLs. Upon evaluation, patient requires minimal  assist for UB ADLs, moderate assist for LB ADLs, transfers and functional ambulation in room and bathroom with minimal  assist.  Patient is oriented x 4.  Occupational performance is affected by the following deficits: decreased initiation, decreased muscle strength, dynamic sit/ stand balance deficit with poor standing tolerance time for self care and functional mobility, decreased activity tolerance, increased pain, and delayed righting and  equilibrium reactions. Patient to benefit from continued Occupational Therapy treatment while in the hospital to address deficits as defined above and maximize level of functional independence with ADLs and functional mobility. Occupational Performance areas to address include: grooming , bathing/ shower, dressing, toilet hygiene, transfer to all surfaces, and functional ambulation. From OT standpoint, recommendation at time of d/c would be Level II (moderate resource intensity).     Rehab Resource Intensity Level, OT: II (Moderate Resource Intensity)     Renate GÓMEZ, OTR/L

## 2024-03-01 NOTE — PROGRESS NOTES
[facility]  Progress Note  Name: Lauren Quintero I  MRN: [id number]  Unit/Bed#: -01 I Date of Admission: 2/15/2024   Date of Service: 3/1/2024 I Hospital Day: 15    Assessment/Plan   * Acute on chronic respiratory failure with hypoxia (HCC)  Assessment & Plan  Currently SpO2: 96 % on Nasal Cannula O2 Flow Rate (L/min): 4 L/min    At baseline, uses 4L NC  CT Imaging showing mild pulm edema, scattered areas of tree-in-bud opacity  Continue to treat underlying NSTEMI, COVID, mild pulm edema  2/23 repeat chest xray Unchanged appearance of mild interstitial edema.Right basilar opacity likely representing small effusion with atelectasis. Please note pneumonia may demonstrate a similar appearance the appropriate clinical setting.  2/26 CXR was generally unchanged from 2/23 CXR. Continue treatment for pneumonia vs COPD exacerbation vs atelectasis. Pt continues to complain of pleuritic right lower chest pain.   Pt continues to complain of right upper abdominal/lower chest wall pain. On exam today found to have a palpable lower liver edge that was tender to palpation. Hepatic function tests and RUQ US were negative for any acute process.   IR was consulted for drainage of right lower lobe fluid accumulation, but decided the accumulation was too small to tap. Will get a repeat chest CT to evaluate effusion as well as broaden antibiotic coverage to IV Unasyn  Repeat Chest CT 2/28 was negative for PE. Showed consolidation vs. Atelectasis in right lower lobe with mucus plugging in bronchi. Pulmonology was consulted, recommend flutter valve and vest therapy as well as continue Unasyn IV, prednisone taper, daily nebs. Pt is not a good candidate for bronchoscopy b/c of her recent NSTEMI, triple vessel disease, HFreF. Superimoposed bacterial pneumonia vs. Aspiration pneumonia. Will order a swallow study to further evaluate. Procalcitonin still elevated this morning at 0.56, down from 0.92 yesterday. WBC  down trending at 10.17 today from 15.34 yesterday.  2/29: Oxygen wean down to baseline 4L NC, pt says her right lower chest pain is much improved compared to yesterday, breathing better on exam.   2/30: Swallow study showed mild esophageal retention, can't rule out aspiration. Pt's O2 requirements at baseline, RLL pain is improving, WBC normalized to 8.82, continues to be afebrile.     Patient does have some hemoptysis on 3/1 so we will continue to monitor  Continue antibiotic therapy with Unasyn  Continue flutter valve  Continues to be spirometry  Continue to monitoring oxygen just enough to keep saturation around 92%  Continue modified diet  Pulmonary input appreciated  Patient requires continued stay    COVID-19  Assessment & Plan  Lab Results   Component Value Date    SARSCOV2 Positive (A) 02/15/2024    SARSCOV2 Negative 12/12/2023    SARSCOV2 Negative 10/28/2023       Symptoms of COVID-19 SYMPTOMS: shortness of breath and Tachycardia (more than 100 beats per min)  Vaccine status: vaccinated  The patient does have acute hypoxemic respiratory failure given the same    CT chest w contrast showing Scattered areas of tree-in-bud opacity consistent with endobronchial impaction/infection  S/p remdesevir, IV steroids  Finished baricitinib  Continue to monitor    Elevated troponin  Assessment & Plan  Lab Results   Component Value Date    HSTNI0 2,584 (H) 02/17/2024    HSTNI2 2,049 (H) 02/17/2024    HSTNID2 -535 02/17/2024     Presented with elevated troponins, SOB, chest pain  CT chest showing no evidence of pulmonary embolism, did note mild pulmonary edema  No chest pain present, but significant SOB, anginal equivalent, sudden onset, fairly elevated troponins  EKG: Sinus Tachycardia with no obvious STEMI  ADRIEN Score: 5    Initially Suspected a true type I NSTEMI but cath despite revealing CAD did not reveal lesion that could be intervened  Cardiac cath showed significant triple vessel disease with 90% stenosis in the  mid LAD, 80% stenosis in the proximal LAD, 85% stenosis in the mid Cx, and 70% in the RCA with a patent mid RCA stent. Cardiology recommends maximizing medical therapy for her CAD and follow-up outpatient with a CT surgeon for a potential CABG once she fully recovers from COVID.  It is possible that she had Takotsubo's cardiomyopathy and underlying CAD. As per cath  Patient has ischemic cardiomyopathy with reduced EF of 35%, Lifevest has been fitted today        CKD (chronic kidney disease)  Assessment & Plan  Lab Results   Component Value Date    EGFR 61 03/01/2024    EGFR 63 02/29/2024    EGFR 57 02/28/2024    CREATININE 0.91 03/01/2024    CREATININE 0.88 02/29/2024    CREATININE 0.96 02/28/2024     Pt has a history of CKD stage 2  Transitioned to po lasix 20 mg daily,   Cont to monitor bmp    COPD with exacerbation (HCC)  Assessment & Plan  Worsening Sob, wheezing, increased o2 requirements as above.   scheduled nebulizer treatment  Continue with po prednisone taper    Major depressive disorder, recurrent episode, moderate (HCC)  Assessment & Plan  Continue wellbutrin, vilazodone  Also takes Vraylar; recommend bringing from home as we do not have it on formulary - patient now has it     Essential hypertension  Assessment & Plan  Continue home meds; initiate lisinopril as part of NSTEMI management  Continue carvedilol  PRN IV Labetalol for SBP > 160  Resume lasix 20 mg po daily  Continue to monitor blood pressure                     VTE Pharmacologic Prophylaxis: VTE Score: 7 Moderate Risk (Score 3-4) - Pharmacological DVT Prophylaxis Ordered: heparin.    Mobility:   Basic Mobility Inpatient Raw Score: 14  -HLM Goal: 4: Move to chair/commode  -HLM Achieved: 5: Stand (1 or more minutes)  HLM Goal achieved. Continue to encourage appropriate mobility.    Patient Centered Rounds: I performed bedside rounds with nursing staff today.  Discussions with Specialists or Other Care Team Provider:  IP CONSULT TO  CARDIOLOGY  IP CONSULT TO PULMONOLOGY      Education and Discussions with Family / Patient:   Patient - she did not want me to talk to her son today    Current Length of Stay: 15 day(s)  Current Patient Status: Inpatient   Certification Statement: The patient will continue to require additional inpatient hospital stay due to plan as noted above  Discharge Plan: Anticipate discharge tomorrow to rehab facility.    Code Status: Level 3 - DNAR and DNI    Subjective:     Pt is resting comfortably in bed. Slept well no events overnight. Still complains of right lower lobe pleuritic pain, only present when takes a deep breath. No fever, cough improving, on soft diet. No CP, SOB, abdominal pain, LE edema. Says she still doesn't feel comfortable being discharged to rehab facility and would like to stay. All questions answered.     Objective:     Vitals:   Temp (24hrs), Av.9 °F (36.6 °C), Min:97.7 °F (36.5 °C), Max:98.1 °F (36.7 °C)    Temp:  [97.7 °F (36.5 °C)-98.1 °F (36.7 °C)] 97.8 °F (36.6 °C)  HR:  [80-92] 84  Resp:  [18-20] 18  BP: (105-140)/(54-72) 112/59  SpO2:  [93 %-99 %] 96 %  Body mass index is 20.07 kg/m².     Input and Output Summary (last 24 hours):     Intake/Output Summary (Last 24 hours) at 3/1/2024 1642  Last data filed at 3/1/2024 1300  Gross per 24 hour   Intake 420 ml   Output --   Net 420 ml       Physical Exam:   Physical Exam  Constitutional:       General: She is not in acute distress.     Appearance: Normal appearance. She is not ill-appearing.   HENT:      Head: Normocephalic and atraumatic.      Right Ear: External ear normal. There is no impacted cerumen.      Left Ear: External ear normal. There is no impacted cerumen.      Nose: Nose normal.      Mouth/Throat:      Mouth: Mucous membranes are moist.      Pharynx: Oropharynx is clear.   Eyes:      Extraocular Movements: Extraocular movements intact.      Conjunctiva/sclera: Conjunctivae normal.   Cardiovascular:      Rate and Rhythm: Normal  rate and regular rhythm.      Pulses: Normal pulses.      Heart sounds: Normal heart sounds. No murmur heard.  Pulmonary:      Effort: Pulmonary effort is normal. No respiratory distress.      Breath sounds: No stridor. Rhonchi present. No wheezing.   Chest:      Chest wall: No tenderness.   Abdominal:      General: Abdomen is flat. Bowel sounds are normal.      Palpations: Abdomen is soft.      Tenderness: There is no abdominal tenderness.   Musculoskeletal:         General: Normal range of motion.      Right lower leg: No edema.      Left lower leg: No edema.   Skin:     General: Skin is warm and dry.      Capillary Refill: Capillary refill takes less than 2 seconds.      Coloration: Skin is not jaundiced or pale.      Findings: No bruising, erythema, lesion or rash.   Neurological:      General: No focal deficit present.      Mental Status: She is alert and oriented to person, place, and time.            Additional Data:     Labs:  Results from last 7 days   Lab Units 03/01/24  0447   WBC Thousand/uL 8.82   HEMOGLOBIN g/dL 10.1*   HEMATOCRIT % 33.1*   PLATELETS Thousands/uL 212   NEUTROS PCT % 78*   LYMPHS PCT % 12*   MONOS PCT % 8   EOS PCT % 0     Results from last 7 days   Lab Units 03/01/24  0447 02/29/24  0624   SODIUM mmol/L 143 142   POTASSIUM mmol/L 3.8 4.4   CHLORIDE mmol/L 104 105   CO2 mmol/L 32 30   BUN mg/dL 29* 32*   CREATININE mg/dL 0.91 0.88   ANION GAP mmol/L 7 7   CALCIUM mg/dL 8.5 9.0   ALBUMIN g/dL  --  3.1*   TOTAL BILIRUBIN mg/dL  --  0.36   ALK PHOS U/L  --  69   ALT U/L  --  6*   AST U/L  --  12*   GLUCOSE RANDOM mg/dL 128 105                 Results from last 7 days   Lab Units 02/29/24  0624 02/28/24  0517 02/26/24  0438 02/25/24  0524   PROCALCITONIN ng/ml 0.56* 0.92* 0.24 0.30*       Lines/Drains:  Invasive Devices       Peripheral Intravenous Line  Duration             Peripheral IV 02/28/24 Right Antecubital 2 days                      Telemetry:  Telemetry Orders (From admission,  onward)               24 Hour Telemetry Monitoring  Continuous x 24 Hours (Telem)        Expiring   Question:  Reason for 24 Hour Telemetry  Answer:  Arrhythmias requiring acute medical intervention / PPM or ICD malfunction                     Telemetry Reviewed: Normal Sinus Rhythm  Indication for Continued Telemetry Use: Acute MI/Unstable Angina/Rule out ACS             Imaging: Reviewed radiology reports from this admission including:   XR chest 1 view portable    Result Date: 2/16/2024  Impression: Mild pulmonary interstitial edema. Workstation performed: [id number]     CT chest with contrast    Result Date: 2/15/2024  Impression: No evidence for pulmonary embolism. Mild pulmonary edema. Scattered areas of tree-in-bud opacity consistent with endobronchial impaction/infection. Workstation performed: [id number]       No Chest XR results available for this patient.     Results for orders placed during the hospital encounter of 02/15/24    Echo complete w/ contrast if indicated    Interpretation Summary    Left Ventricle: Left ventricular cavity size is normal. Wall thickness is mildly increased. There is mild asymmetric hypertrophy of the basal septal wall. The left ventricular ejection fraction is 35%. Systolic function is severely reduced. Diastolic function is mildly abnormal, consistent with grade I (abnormal) relaxation.    The following segments are hypokinetic: mid anteroseptal, mid inferoseptal, apical anterior, apical septal, apical inferior and apex.    Mitral Valve: There is severe annular calcification.    Tricuspid Valve: There is mild regurgitation.    IVC/SVC: The inferior vena cava is dilated.    Pulmonary Artery: The estimated pulmonary artery systolic pressure is 40.0 mmHg. The pulmonary artery systolic pressure is mildly increased.      Recent Cultures (last 7 days):   Results from last 7 days   Lab Units 02/28/24  1342   LEGIONELLA URINARY ANTIGEN  Negative       Last 24 Hours Medication List:    Current Facility-Administered Medications   Medication Dose Route Frequency Provider Last Rate    acetaminophen  975 mg Oral Q8H PRN Vasu Ramos PA-C      albuterol  2 puff Inhalation Q4H PRN Vasu Ramos PA-C      albuterol  2.5 mg Nebulization Q4H PRN Vasu Ramos PA-C      aluminum-magnesium hydroxide-simethicone  30 mL Oral Q6H PRN Vasu Ramos PA-C      ampicillin-sulbactam  3 g Intravenous Q6H Kenny Stone MD 3 g (03/01/24 1450)    aspirin  81 mg Oral Daily Vasu Ramos PA-C      budesonide-formoterol  2 puff Inhalation BID Vasu Ramos PA-C      buPROPion  75 mg Oral Daily Vasu Ramos PA-C      carvedilol  25 mg Oral BID With Meals Vasu Ramos PA-C      docusate sodium  100 mg Oral BID Vasu Ramos PA-C      furosemide  20 mg Oral Daily Alcides Rutherford MD      guaiFENesin  1,200 mg Oral Q12H ROSELINE Latia Dominiquelizeth Cortes, DO      heparin (porcine)  5,000 Units Subcutaneous Q8H On license of [facility] Alcides Rutherford MD      ipratropium  0.5 mg Nebulization TID Herve Jerez PA-C      labetalol  10 mg Intravenous Q6H PRN Vasu Ramos PA-C      levalbuterol  1.25 mg Nebulization TID Herve Jerez PA-C      lidocaine  2 patch Topical Daily PRN Vasu Ramos PA-C      LORazepam  0.5 mg Oral BID PRN Latia Dominique DO Sophia      melatonin  3 mg Oral HS PRN Vasu Ramos PA-C      midodrine  2.5 mg Oral TID AC Vasu Ramos PA-C      morphine injection  2 mg Intravenous Q6H PRN Kenny Stone MD      nitroglycerin  0.4 mg Sublingual Q5 Min PRN Vasu Ramos PA-C      ondansetron  4 mg Intravenous Q6H PRN Annelyse Rebeca Cruzan, PA-C      pantoprazole  40 mg Oral Daily Vasu Ramos PA-C      pravastatin  80 mg Oral QPM Vasu Ramos PA-C      predniSONE  30 mg Oral Daily Herve Jerez PA-C      sodium chloride  4 mL Nebulization TID Herve Jerez PA-C       vilazodone  40 mg Oral Daily With Breakfast Vasu Ramos PA-C      Zanubrutinib  160 mg Oral BID Vasu Ramos PA-C          Today, Patient Was Seen By: Kenny Stone MD and the attending physician, Kenny Stone,*        **Please Note: This note may have been constructed using a voice recognition system.**

## 2024-03-01 NOTE — PROGRESS NOTES
OP RT CM received incoming referral and in basket message regarding patient for needed outreach.  Patient is currently hospitalized.  I will outreach once discharged to home.

## 2024-03-01 NOTE — PROGRESS NOTES
"Progress Note - Pulmonary   Lauren Quintero 78 y.o. female MRN: [id number]  Unit/Bed#: -01 Encounter: [id number]    Assessment:  Acute on chronic hypoxemic respiratory failure  COVID-19 pneumonia  Superimposed bacterial pneumonia with abnormal chest CT  COPD with acute exacerbation  Triple-vessel CAD    Plan:  Acute on chronic hypoxemic respiratory failure multifactorial in the setting of COVID-19 pneumonia, COPD exacerbation, triple-vessel CAD, NSTEMI, aspiration pneumonia  Currently back to her baseline O2 requirement of 4 L  Large consolidation of the right lower lung with opacification of the right lower lobe bronchus  Suspect related to aspiration, speech therapy is following  She is afebrile, her leukocytosis has resolved  Continue antibiotics to complete a 5-day course, today is day 3 of Unasyn  Airway clearance measures with flutter valve, incentive spirometer, would recommend out of bed to chair as able to aid in mucus clearance  No need for bronchoscopy at this time, would be high risk given her triple-vessel CAD, recent NSTEMI, has had improvement in her O2 requirement and symptoms overall  Would need repeat imaging in 6 weeks to assess for resolution of the right-sided pneumonia  Continue Symbicort as well as prednisone taper decreasing by 10 mg every 3 days  Will see patient again on 3/4/2024, please call over the weekend with questions    Subjective:   Patient resting in bed.  Still having some right-sided pleuritic pain.  Still coughing up some sputum.  Has not been out of bed.    Objective:     Vitals: Blood pressure 140/66, pulse 80, temperature 97.9 °F (36.6 °C), temperature source Oral, resp. rate 18, height 5' 8\" (1.727 m), weight 59.9 kg (132 lb), SpO2 95%, not currently breastfeeding.,Body mass index is 20.07 kg/m².      Intake/Output Summary (Last 24 hours) at 3/1/2024 1112  Last data filed at 3/1/2024 0900  Gross per 24 hour   Intake 180 ml   Output --   Net 180 ml       Invasive Devices  " "     Peripheral Intravenous Line  Duration             Peripheral IV 02/28/24 Right Antecubital 2 days                    Physical Exam: /66 (BP Location: Left arm)   Pulse 84   Temp 97.7 °F (36.5 °C)   Resp 18   Ht 5' 8\" (1.727 m)   Wt 59.9 kg (132 lb)   SpO2 96%   BMI 20.07 kg/m²   General appearance: alert and oriented, in no acute distress  Head: Normocephalic, without obvious abnormality, atraumatic  Eyes: negative findings: conjunctivae and sclerae normal  Lungs:  rhonchi, decreased right base, wet cough  Heart: regular rate and rhythm  Abdomen: normal findings: soft, non-tender  Extremities:  trace edema  Skin:  warm and dry  Neurologic: Mental status: Alert, oriented, thought content appropriate     Labs: I have personally reviewed pertinent lab results., CBC:   Lab Results   Component Value Date    WBC 8.82 03/01/2024    HGB 10.1 (L) 03/01/2024    HCT 33.1 (L) 03/01/2024    MCV 98 03/01/2024     03/01/2024    RBC 3.39 (L) 03/01/2024    MCH 29.8 03/01/2024    MCHC 30.5 (L) 03/01/2024    RDW 13.2 03/01/2024    MPV 11.7 03/01/2024    NRBC 0 03/01/2024   , CMP:   Lab Results   Component Value Date    SODIUM 143 03/01/2024    K 3.8 03/01/2024     03/01/2024    CO2 32 03/01/2024    BUN 29 (H) 03/01/2024    CREATININE 0.91 03/01/2024    CALCIUM 8.5 03/01/2024    EGFR 61 03/01/2024     Imaging and other studies: I have personally reviewed pertinent reports.   and I have personally reviewed pertinent films in PACS    "

## 2024-03-01 NOTE — OCCUPATIONAL THERAPY NOTE
Occupational Therapy Evaluation      Lauren Quintero    3/1/2024    Patient Active Problem List   Diagnosis    Ambulatory dysfunction    Hypokalemia    Essential hypertension    Tobacco abuse    Acute on chronic respiratory failure with hypoxia (HCC)    CAD (coronary artery disease), native coronary artery    Flank pain    Major depressive disorder, recurrent episode, moderate (HCC)    Fatigue    COPD with exacerbation (HCC)    Anemia    Abnormal computed tomography angiography (CTA)    Severe protein-calorie malnutrition (HCC)    Positive blood culture    Waldenstrom macroglobulinemia (HCC)    Generalized weakness    Staring episodes    BRBPR (bright red blood per rectum)    Moderate protein-calorie malnutrition (HCC)    Diverticulitis    Acute on chronic respiratory failure with hypoxia and hypercapnia (HCC)    Social problem    Dizziness    NSTEMI (non-ST elevated myocardial infarction) ([facility])    COVID-19    CKD (chronic kidney disease)       Past Medical History:   Diagnosis Date    Acute congestive heart failure ([facility]) 8/27/2021    Anxiety     Cardiac disease     Chest pain 8/27/2021    Chronic pain disorder     COPD (chronic obstructive pulmonary disease) ([facility])     Depression     Heart disease     Hyperlipidemia     Hypertension     MI (myocardial infarction) ([facility])     MRSA (methicillin resistant Staphylococcus aureus)     Renal disorder     benign kidney tumor       Past Surgical History:   Procedure Laterality Date    APPENDECTOMY      CARDIAC CATHETERIZATION N/A 2/21/2024    Procedure: Cardiac catheterization;  Surgeon: Luke Malagon MD;  Location: [facility];  Service: Cardiology    CARDIAC CATHETERIZATION N/A 2/21/2024    Procedure: Cardiac Coronary Angiogram;  Surgeon: Luke Malagon MD;  Location: [facility];  Service: Cardiology    CARDIAC CATHETERIZATION Left 2/21/2024    Procedure: Cardiac Left Heart Cath;  Surgeon: Luke Malagon MD;  Location: [facility];   "Service: Cardiology    ELBOW BURSA SURGERY Left 02/2018    D/T MRSA INFECTION    IR THORACENTESIS  2/27/2024    SPINAL FUSION      L7 L9        03/01/24 1059   OT Last Visit   OT Visit Date 03/01/24   Note Type   Note type Evaluation   Additional Comments Pt agreeable to OT eval. Upon arrival pt supine in bed with HOB elevated   Pain Assessment   Pain Assessment Tool 0-10   Pain Score 9   Pain Location/Orientation Location: Back   Pain Onset/Description Onset: Ongoing;Frequency: Constant/Continuous   Hospital Pain Intervention(s) Ambulation/increased activity;Repositioned;Medication (See MAR)   Restrictions/Precautions   Weight Bearing Precautions Per Order No   Other Precautions Fall Risk;Telemetry;Pain;O2  (4 L O2 via NC)   Home Living   Type of Home House   Home Layout One level;Performs ADLs on one level;Able to live on main level with bedroom/bathroom;Stairs to enter with rails  (4 ADELINA)   Bathroom Shower/Tub Tub/shower unit   Bathroom Toilet Standard   Bathroom Equipment Grab bars in shower;Grab bars around toilet;Tub transfer bench   Bathroom Accessibility Accessible   Home Equipment Walker  (utilizes rollator at baseline)   Prior Function   Level of [place] Independent with ADLs;Independent with functional mobility;Needs assistance with IADLS   Lives With Son   Receives Help From Family   IADLs Family/Friend/Other provides transportation;Family/Friend/Other provides meals;Family/Friend/Other provides medication management   Falls in the last 6 months 1 to 4  (\"1 bad one\")   Vocational Retired   Lifestyle   Autonomy Patient reporting being independent with ADLs, ambulatory with rollator, and lives with son. Pt has been residing at [facility] for STR PTA.   Reciprocal Relationships Son   Service to Others Retired   ADL   Eating Assistance 5  Supervision/Setup   Grooming Assistance 5  Supervision/Setup   UB Bathing Assistance 4  Minimal Assistance   LB Bathing Assistance 3  Moderate Assistance   UB " Dressing Assistance 4  Minimal Assistance   LB Dressing Assistance 3  Moderate Assistance   Toileting Assistance  3  Moderate Assistance   Additional Comments ADL levels based on functional performance during OT eval.   Bed Mobility   Supine to Sit 4  Minimal assistance   Additional items Assist x 1;HOB elevated;Bedrails;Increased time required;Verbal cues   Sit to Supine   (DNT: pt seated at EOB at end of session)   Transfers   Sit to Stand 4  Minimal assistance   Additional items Assist x 1;Increased time required;Verbal cues;Armrests   Stand to Sit 4  Minimal assistance   Additional items Assist x 1;Armrests;Increased time required;Verbal cues   Functional Mobility   Additional Comments Pt declined further mobility d/t pain and SOB.   Balance   Static Sitting Fair   Dynamic Sitting Fair -   Static Standing Poor +   Activity Tolerance   Activity Tolerance Treatment limited secondary to medical complications (Comment);Patient limited by pain  (SOB (RN & respiratory therapist made aware))   Medical Staff Made Aware Pt seen as a co-eval with PT due to the patient's co-morbidities and clinically unstable presentation indicated by chart review.   RUE Assessment   RUE Assessment WFL  (grossly 3+/5 MMT based on functional performance during OT session)   LUE Assessment   LUE Assessment WFL  (grossly 3+/5 MMT based on functional performance during OT session)   Hand Function   Gross Motor Coordination Functional   Fine Motor Coordination Functional   Cognition   Overall Cognitive Status WFL   Arousal/Participation Responsive;Alert;Cooperative   Attention Within functional limits   Orientation Level Oriented X4   Memory Within functional limits   Following Commands Follows one step commands without difficulty   Assessment   Limitation Decreased ADL status;Decreased UE strength;Decreased endurance;Decreased self-care trans;Decreased high-level ADLs   Prognosis Good   Assessment Patient is a 78 y.o. female seen for OT  evaluation s/p admit to [facility]  on 2/15/2024 w/NSTEMI (non-ST elevated myocardial infarction) (HCC). Commorbidities affecting patient's functional performance at time of assessment include: HTN, acute on chronic respiratory failure, MDD, COPD, . Orders placed for OT evaluation and treatment and activity as tolerated . Performed at least two patient identifiers during session including name and wristband.  Prior to admission, Patient reporting being independent with ADLs, ambulatory with rollator, and lives with son. Pt has been residing at [facility] for STR PTA. Personal factors affecting patient at time of initial evaluation include: steps to enter, difficulty performing ADLs, and difficulty performing IADLs. Upon evaluation, patient requires minimal  assist for UB ADLs, moderate assist for LB ADLs, transfers and functional ambulation in room and bathroom with minimal  assist.  Patient is oriented x 4.  Occupational performance is affected by the following deficits: decreased initiation, decreased muscle strength, dynamic sit/ stand balance deficit with poor standing tolerance time for self care and functional mobility, decreased activity tolerance, increased pain, and delayed righting and equilibrium reactions. Patient to benefit from continued Occupational Therapy treatment while in the hospital to address deficits as defined above and maximize level of functional independence with ADLs and functional mobility. Occupational Performance areas to address include: grooming , bathing/ shower, dressing, toilet hygiene, transfer to all surfaces, and functional ambulation. From OT standpoint, recommendation at time of d/c would be Level II (moderate resource intensity).   Goals   Patient Goals to get better   Plan   Treatment Interventions ADL retraining;Functional transfer training;UE strengthening/ROM;Endurance training;Patient/family training;Equipment evaluation/education;Compensatory  technique education;Activityengagement   Goal Expiration Date 03/16/24   OT Treatment Day 0   OT Frequency 3-5x/wk   Discharge Recommendation   Rehab Resource Intensity Level, OT II (Moderate Resource Intensity)   AM-PAC Daily Activity Inpatient   Lower Body Dressing 2   Bathing 2   Toileting 2   Upper Body Dressing 3   Grooming 3   Eating 3   Daily Activity Raw Score 15   Daily Activity Standardized Score (Calc for Raw Score >=11) 34.69   AM-PAC Applied Cognition Inpatient   Following a Speech/Presentation 3   Understanding Ordinary Conversation 4   Taking Medications 3   Remembering Where Things Are Placed or Put Away 3   Remembering List of 4-5 Errands 3   Taking Care of Complicated Tasks 3   Applied Cognition Raw Score 19   Applied Cognition Standardized Score 39.77   Modified Creek Scale   Modified [place] Scale 4   End of Consult   Patient Position at End of Consult Seated edge of bed;All needs within reach   Nurse Communication Nurse aware of consult     GOALS:    *ADL transfers with (I) for inc'd independence with ADLs/purposeful tasks    *UB ADL with (I) for inc'd independence with self cares    *LB ADL with (I) using AE prn for inc'd independence with self cares    *Toileting with (I) for clothing management and hygiene for return to PLOF with personal care    *Increase stand tolerance x 5  m for inc'd tolerance with standing purposeful tasks    *Participate in 10m UE therex to increase overall stamina/activity tolerance for purposeful tasks    *Bed mobility- (I) for inc'd independence to manage own comfort and initiate EOB & OOB purposeful tasks    *Patient will verbalize 3 safety awareness/ principles to prevent falls in the home setting.     *Patient will verbalize and demonstrate use of energy conservation/deep breathing techniques and work simplification skills during functional activities with no verbal cues.     *Patient will increase OOB/sitting tolerance to 2-4 hours per day to  increase participation in self-care and leisure tasks with no s/s of exertion.     *Pt will demonstrate use of long handled AE during 100% of tx sessions for increased ADL safety and independence following D/C     DALIA Segura, OTR/L

## 2024-03-01 NOTE — PLAN OF CARE
Problem: INFECTION - ADULT  Goal: Absence or prevention of progression during hospitalization  Description: INTERVENTIONS:  - Assess and monitor for signs and symptoms of infection  - Monitor lab/diagnostic results  - Monitor all insertion sites, i.e. indwelling lines, tubes, and drains  - Monitor endotracheal if appropriate and nasal secretions for changes in amount and color  - [place] appropriate cooling/warming therapies per order  - Administer medications as ordered  - Instruct and encourage patient and family to use good hand hygiene technique  - Identify and instruct in appropriate isolation precautions for identified infection/condition  Outcome: Progressing  Goal: Absence of fever/infection during neutropenic period  Description: INTERVENTIONS:  - Monitor WBC    Outcome: Progressing     Problem: RESPIRATORY - ADULT  Goal: Achieves optimal ventilation and oxygenation  Description: INTERVENTIONS:  - Assess for changes in respiratory status  - Assess for changes in mentation and behavior  - Position to facilitate oxygenation and minimize respiratory effort  - Oxygen administered by appropriate delivery if ordered  - Initiate smoking cessation education as indicated  - Encourage broncho-pulmonary hygiene including cough, deep breathe, Incentive Spirometry  - Assess the need for suctioning and aspirate as needed  - Assess and instruct to report SOB or any respiratory difficulty  - Respiratory Therapy support as indicated  Outcome: Progressing     Problem: PAIN - ADULT  Goal: Verbalizes/displays adequate comfort level or baseline comfort level  Description: Interventions:  - Encourage patient to monitor pain and request assistance  - Assess pain using appropriate pain scale  - Administer analgesics based on type and severity of pain and evaluate response  - Implement non-pharmacological measures as appropriate and evaluate response  - Consider cultural and social influences on pain and pain management  -  Notify physician/advanced practitioner if interventions unsuccessful or patient reports new pain  Outcome: Progressing

## 2024-03-01 NOTE — PLAN OF CARE
Dys3/thin  Meds c puree  Respiratory monitoring  Reflux/Aspiration precautions   ST will continue to follow x1-3

## 2024-03-01 NOTE — ASSESSMENT & PLAN NOTE
Currently SpO2: 96 % on Nasal Cannula O2 Flow Rate (L/min): 4 L/min    At baseline, uses 4L NC  CT Imaging showing mild pulm edema, scattered areas of tree-in-bud opacity  Continue to treat underlying NSTEMI, COVID, mild pulm edema  2/23 repeat chest xray Unchanged appearance of mild interstitial edema.Right basilar opacity likely representing small effusion with atelectasis. Please note pneumonia may demonstrate a similar appearance the appropriate clinical setting.  2/26 CXR was generally unchanged from 2/23 CXR. Continue treatment for pneumonia vs COPD exacerbation vs atelectasis. Pt continues to complain of pleuritic right lower chest pain.   Pt continues to complain of right upper abdominal/lower chest wall pain. On exam today found to have a palpable lower liver edge that was tender to palpation. Hepatic function tests and RUQ US were negative for any acute process.   IR was consulted for drainage of right lower lobe fluid accumulation, but decided the accumulation was too small to tap. Will get a repeat chest CT to evaluate effusion as well as broaden antibiotic coverage to IV Unasyn  Repeat Chest CT 2/28 was negative for PE. Showed consolidation vs. Atelectasis in right lower lobe with mucus plugging in bronchi. Pulmonology was consulted, recommend flutter valve and vest therapy as well as continue Unasyn IV, prednisone taper, daily nebs. Pt is not a good candidate for bronchoscopy b/c of her recent NSTEMI, triple vessel disease, HFreF. Superimoposed bacterial pneumonia vs. Aspiration pneumonia. Will order a swallow study to further evaluate. Procalcitonin still elevated this morning at 0.56, down from 0.92 yesterday. WBC down trending at 10.17 today from 15.34 yesterday.  2/29: Oxygen wean down to baseline 4L NC, pt says her right lower chest pain is much improved compared to yesterday, breathing better on exam.   2/30: Swallow study showed mild esophageal retention, can't rule out aspiration. Pt's O2  requirements at baseline, RLL pain is improving, WBC normalized to 8.82, continues to be afebrile.     Patient does have some hemoptysis on 3/1 so we will continue to monitor  Continue antibiotic therapy with Unasyn  Continue flutter valve  Continues to be spirometry  Continue to monitoring oxygen just enough to keep saturation around 92%  Continue modified diet  Pulmonary input appreciated  Patient requires continued stay

## 2024-03-01 NOTE — SPEECH THERAPY NOTE
Speech Language/Pathology     Speech/Language Pathology Progress Note     Patient Name: Lauren Quintero    Today's Date: 3/1/2024     Problem List  Principal Problem:    NSTEMI (non-ST elevated myocardial infarction) (HCC)  Active Problems:    Essential hypertension    Acute on chronic respiratory failure with hypoxia (HCC)    Major depressive disorder, recurrent episode, moderate (HCC)    COPD with exacerbation (HCC)    COVID-19    CKD (chronic kidney disease)       Subjective:  Patient received flat in bed, attempting to eat from lunch tray +coughing.     Previous/current diet: dys3/thin     Objective:  MBS results reviewed and recommendations/rationale described to patient.  Patient self feeds soft solids and thin liquids.  Episodic coughing noted throughout, not c/w specific texture.   Clears oral cavity.  Noted small bites with slow eating time.  O2 saturation remains 93% for duration of lunch; pt on 4L NC.    Patient benefits from reinforcement of strategies including but not limited to: optional feeding position,  softer options, extended mealtime. Verbalizes understanding of all information presented.  Cannot r/o chronic aspiration/reduced secretion mgmt.      Assessment:  See MBS report     Plan:  Dys3/thin  Meds c puree  Respiratory monitoring  Reflux/Aspiration precautions   ST will continue to follow x1-3       Corine Gastelum MS, CCC-SLP  Speech-Language Pathologist  [place] #[id number]  [place] #[id number]

## 2024-03-01 NOTE — ASSESSMENT & PLAN NOTE
Lab Results   Component Value Date    EGFR 61 03/01/2024    EGFR 63 02/29/2024    EGFR 57 02/28/2024    CREATININE 0.91 03/01/2024    CREATININE 0.88 02/29/2024    CREATININE 0.96 02/28/2024     Pt has a history of CKD stage 2  Transitioned to po lasix 20 mg daily,   Cont to monitor bmp

## 2024-03-01 NOTE — ASSESSMENT & PLAN NOTE
Lab Results   Component Value Date    HSTNI0 2,584 (H) 02/17/2024    HSTNI2 2,049 (H) 02/17/2024    HSTNID2 -535 02/17/2024     Presented with elevated troponins, SOB, chest pain  CT chest showing no evidence of pulmonary embolism, did note mild pulmonary edema  No chest pain present, but significant SOB, anginal equivalent, sudden onset, fairly elevated troponins  EKG: Sinus Tachycardia with no obvious STEMI  ADRIEN Score: 5    Initially Suspected a true type I NSTEMI but cath despite revealing CAD did not reveal lesion that could be intervened  Cardiac cath showed significant triple vessel disease with 90% stenosis in the mid LAD, 80% stenosis in the proximal LAD, 85% stenosis in the mid Cx, and 70% in the RCA with a patent mid RCA stent. Cardiology recommends maximizing medical therapy for her CAD and follow-up outpatient with a CT surgeon for a potential CABG once she fully recovers from COVID.  Patient has ischemic cardiomyopathy with reduced EF of 35%, Lifevest has been fitted today

## 2024-03-01 NOTE — PLAN OF CARE
Problem: PHYSICAL THERAPY ADULT  Goal: Performs mobility at highest level of function for planned discharge setting.  See evaluation for individualized goals.  Description: Treatment/Interventions: Functional transfer training, LE strengthening/ROM, Therapeutic exercise, Endurance training, Patient/family training, Equipment eval/education, Bed mobility, Continued evaluation, Spoke to nursing, OT, Elevations, Gait training  Equipment Recommended: Walker (RW)       See flowsheet documentation for full assessment, interventions and recommendations.  3/1/2024 1228 by Amanda Murphy PT  Note: Prognosis: Good  Problem List: Decreased strength, Decreased endurance, Impaired balance, Decreased mobility, Pain  Assessment: Pt is 78 y.o. female seen for PT evaluation on 3/1/2024 s/p admit to [facility] on 2/15/2024 w/ NSTEMI (non-ST elevated myocardial infarction) (HCC). PT was consulted to assess pt's functional mobility and d/c needs. Order placed for PT eval and tx. PTA, pt resides in 1SH with 4 ADELINA, ambulates with rollator, + fall history. At time of eval, pt requiring min A x1 for bed mobility and transfers. Upon evaluation, pt presenting with impaired functional mobility d/t decreased strength, decreased endurance, impaired balance, decreased mobility, pain, and activity intolerance. Pertinent PMHx and current co-morbidities affecting pt's physical performance at time of assessment include: COPD, HTN, tobacco abuse, chronic respiratory failure with hypoxia, CAD, major depressive disorder, anemia, BRBPR, moderate protein calorie malnutrition, ambulatory dysfunction, hypokalemia, sepsis, SIRS, generalized weakness. Personal factors affecting pt at time of eval include: inability to ambulate household distances, inability to navigate level surfaces w/o external assistance, and positive fall history. The following objective measures performed on IE also reveal limitations: Barthel Index: 25/100, Modified Dennis: 4  (moderate/severe disability), and AM-PAC 6-Clicks: 14/24. Pt's clinical presentation is currently unstable/unpredictable seen in pt's presentation of abnormal lab value(s) and ongoing medical assessment. Overall, pt's rehab potential and prognosis to return to PLOF is good as impacted by objective findings, warranting pt to receive further skilled PT interventions to address identified impairments, activity limitation(s), and participation restriction(s). Pt to benefit from continued PT tx to address deficits as defined above and maximize level of functional independent mobility. From PT/mobility standpoint, recommend level 2, moderate resource intensity in order to facilitate return to PLOF.  Barriers to Discharge: Inaccessible home environment, Decreased caregiver support     Rehab Resource Intensity Level, PT: II (Moderate Resource Intensity)    See flowsheet documentation for full assessment.     3/1/2024 1228 by Amanda Murphy PT  Note: Prognosis: Good  Problem List: Decreased strength, Decreased endurance, Impaired balance, Decreased mobility, Pain  Assessment: Pt is 78 y.o. female seen for PT evaluation on 3/1/2024 s/p admit to [facility] on 2/15/2024 w/ NSTEMI (non-ST elevated myocardial infarction) (HCC). PT was consulted to assess pt's functional mobility and d/c needs. Order placed for PT eval and tx. PTA, pt resides in 1SH with 4 ADELINA, ambulates with rollator, + fall history. At time of eval, pt requiring min A x1 for bed mobility and transfers. Upon evaluation, pt presenting with impaired functional mobility d/t decreased strength, decreased endurance, impaired balance, decreased mobility, pain, and activity intolerance. Pertinent PMHx and current co-morbidities affecting pt's physical performance at time of assessment include: COPD, HTN, tobacco abuse, chronic respiratory failure with hypoxia, CAD, major depressive disorder, anemia, BRBPR, moderate protein calorie malnutrition, ambulatory  dysfunction, hypokalemia, sepsis, SIRS, generalized weakness. Personal factors affecting pt at time of eval include: inability to ambulate household distances, inability to navigate level surfaces w/o external assistance, and positive fall history. The following objective measures performed on IE also reveal limitations: Barthel Index: 25/100, Modified Dennis: 4 (moderate/severe disability), and AM-PAC 6-Clicks: 14/24. Pt's clinical presentation is currently unstable/unpredictable seen in pt's presentation of abnormal lab value(s) and ongoing medical assessment. Overall, pt's rehab potential and prognosis to return to PLOF is good as impacted by objective findings, warranting pt to receive further skilled PT interventions to address identified impairments, activity limitation(s), and participation restriction(s). Pt to benefit from continued PT tx to address deficits as defined above and maximize level of functional independent mobility. From PT/mobility standpoint, recommend level 2, moderate resource intensity in order to facilitate return to PLOF.  Barriers to Discharge: Inaccessible home environment, Decreased caregiver support     Rehab Resource Intensity Level, PT: II (Moderate Resource Intensity)    See flowsheet documentation for full assessment.

## 2024-03-01 NOTE — PLAN OF CARE
Problem: Potential for Falls  Goal: Patient will remain free of falls  Description: INTERVENTIONS:  - Educate patient/family on patient safety including physical limitations  - Instruct patient to call for assistance with activity   - Consult OT/PT to assist with strengthening/mobility   - Keep Call bell within reach  - Keep bed low and locked with side rails adjusted as appropriate  - Keep care items and personal belongings within reach  - Initiate and maintain comfort rounds  - Make Fall Risk Sign visible to staff  - Offer Toileting every 2 Hours, in advance of need  - Initiate/Maintain bed alarm  - Obtain necessary fall risk management equipment  - Apply yellow socks and bracelet for high fall risk patients  - Consider moving patient to room near nurses station  Outcome: Progressing     Problem: RESPIRATORY - ADULT  Goal: Achieves optimal ventilation and oxygenation  Description: INTERVENTIONS:  - Assess for changes in respiratory status  - Assess for changes in mentation and behavior  - Position to facilitate oxygenation and minimize respiratory effort  - Oxygen administered by appropriate delivery if ordered  - Initiate smoking cessation education as indicated  - Encourage broncho-pulmonary hygiene including cough, deep breathe, Incentive Spirometry  - Assess the need for suctioning and aspirate as needed  - Assess and instruct to report SOB or any respiratory difficulty  - Respiratory Therapy support as indicated  Outcome: Progressing     Problem: Prexisting or High Potential for Compromised Skin Integrity  Goal: Skin integrity is maintained or improved  Description: INTERVENTIONS:  - Identify patients at risk for skin breakdown  - Assess and monitor skin integrity  - Assess and monitor nutrition and hydration status  - Monitor labs   - Assess for incontinence   - Turn and reposition patient  - Assist with mobility/ambulation  - Relieve pressure over bony prominences  - Avoid friction and shearing  - Provide  appropriate hygiene as needed including keeping skin clean and dry  - Evaluate need for skin moisturizer/barrier cream  - Collaborate with interdisciplinary team   - Patient/family teaching  - Consider wound care consult   Outcome: Progressing     Problem: PAIN - ADULT  Goal: Verbalizes/displays adequate comfort level or baseline comfort level  Description: Interventions:  - Encourage patient to monitor pain and request assistance  - Assess pain using appropriate pain scale  - Administer analgesics based on type and severity of pain and evaluate response  - Implement non-pharmacological measures as appropriate and evaluate response  - Consider cultural and social influences on pain and pain management  - Notify physician/advanced practitioner if interventions unsuccessful or patient reports new pain  Outcome: Progressing     Problem: INFECTION - ADULT  Goal: Absence or prevention of progression during hospitalization  Description: INTERVENTIONS:  - Assess and monitor for signs and symptoms of infection  - Monitor lab/diagnostic results  - Monitor all insertion sites, i.e. indwelling lines, tubes, and drains  - Monitor endotracheal if appropriate and nasal secretions for changes in amount and color  - [place] appropriate cooling/warming therapies per order  - Administer medications as ordered  - Instruct and encourage patient and family to use good hand hygiene technique  - Identify and instruct in appropriate isolation precautions for identified infection/condition  Outcome: Progressing  Goal: Absence of fever/infection during neutropenic period  Description: INTERVENTIONS:  - Monitor WBC    Outcome: Progressing     Problem: SAFETY ADULT  Goal: Patient will remain free of falls  Description: INTERVENTIONS:  - Educate patient/family on patient safety including physical limitations  - Instruct patient to call for assistance with activity   - Consult OT/PT to assist with strengthening/mobility   - Keep Call bell  within reach  - Keep bed low and locked with side rails adjusted as appropriate  - Keep care items and personal belongings within reach  - Initiate and maintain comfort rounds  - Make Fall Risk Sign visible to staff  - Offer Toileting every 2 Hours, in advance of need  - Initiate/Maintain bed alarm  - Obtain necessary fall risk management equipment  - Apply yellow socks and bracelet for high fall risk patients  - Consider moving patient to room near nurses station  Outcome: Progressing  Goal: Maintain or return to baseline ADL function  Description: INTERVENTIONS:  -  Assess patient's ability to carry out ADLs; assess patient's baseline for ADL function and identify physical deficits which impact ability to perform ADLs (bathing, care of mouth/teeth, toileting, grooming, dressing, etc.)  - Assess/evaluate cause of self-care deficits   - Assess range of motion  - Assess patient's mobility; develop plan if impaired  - Assess patient's need for assistive devices and provide as appropriate  - Encourage maximum independence but intervene and supervise when necessary  - Involve family in performance of ADLs  - Assess for home care needs following discharge   - Consider OT consult to assist with ADL evaluation and planning for discharge  - Provide patient education as appropriate  Outcome: Progressing  Goal: Maintains/Returns to pre admission functional level  Description: INTERVENTIONS:  - Perform AM-PAC 6 Click Basic Mobility/ Daily Activity assessment daily.  - Set and communicate daily mobility goal to care team and patient/family/caregiver.   - Collaborate with rehabilitation services on mobility goals if consulted  - Perform Range of Motion 3 times a day.  - Reposition patient every 2 hours.  - Dangle patient 3 times a day  - Stand patient 3 times a day  - Ambulate patient 3 times a day  - Out of bed to chair 3 times a day   - Out of bed for meals 3 times a day  - Out of bed for toileting  - Record patient progress  and toleration of activity level   Outcome: Progressing     Problem: DISCHARGE PLANNING  Goal: Discharge to home or other facility with appropriate resources  Description: INTERVENTIONS:  - Identify barriers to discharge w/patient and caregiver  - Arrange for needed discharge resources and transportation as appropriate  - Identify discharge learning needs (meds, wound care, etc.)  - Arrange for interpretive services to assist at discharge as needed  - Refer to Case Management Department for coordinating discharge planning if the patient needs post-hospital services based on physician/advanced practitioner order or complex needs related to functional status, cognitive ability, or social support system  Outcome: Progressing     Problem: Knowledge Deficit  Goal: Patient/family/caregiver demonstrates understanding of disease process, treatment plan, medications, and discharge instructions  Description: Complete learning assessment and assess knowledge base.  Interventions:  - Provide teaching at level of understanding  - Provide teaching via preferred learning methods  Outcome: Progressing     Problem: MOBILITY - ADULT  Goal: Maintain or return to baseline ADL function  Description: INTERVENTIONS:  -  Assess patient's ability to carry out ADLs; assess patient's baseline for ADL function and identify physical deficits which impact ability to perform ADLs (bathing, care of mouth/teeth, toileting, grooming, dressing, etc.)  - Assess/evaluate cause of self-care deficits   - Assess range of motion  - Assess patient's mobility; develop plan if impaired  - Assess patient's need for assistive devices and provide as appropriate  - Encourage maximum independence but intervene and supervise when necessary  - Involve family in performance of ADLs  - Assess for home care needs following discharge   - Consider OT consult to assist with ADL evaluation and planning for discharge  - Provide patient education as appropriate  Outcome:  Progressing  Goal: Maintains/Returns to pre admission functional level  Description: INTERVENTIONS:  - Perform AM-PAC 6 Click Basic Mobility/ Daily Activity assessment daily.  - Set and communicate daily mobility goal to care team and patient/family/caregiver.   - Collaborate with rehabilitation services on mobility goals if consulted  - Perform Range of Motion 3 times a day.  - Reposition patient every 2 hours.  - Dangle patient 3 times a day  - Stand patient 3 times a day  - Ambulate patient 3 times a day  - Out of bed to chair 3 times a day   - Out of bed for meals 3 times a day  - Out of bed for toileting  - Record patient progress and toleration of activity level   Outcome: Progressing     Problem: Nutrition/Hydration-ADULT  Goal: Nutrient/Hydration intake appropriate for improving, restoring or maintaining nutritional needs  Description: Monitor and assess patient's nutrition/hydration status for malnutrition. Collaborate with interdisciplinary team and initiate plan and interventions as ordered.  Monitor patient's weight and dietary intake as ordered or per policy. Utilize nutrition screening tool and intervene as necessary. Determine patient's food preferences and provide high-protein, high-caloric foods as appropriate.     INTERVENTIONS:  - Monitor oral intake, urinary output, labs, and treatment plans  - Assess nutrition and hydration status and recommend course of action  - Evaluate amount of meals eaten  - Assist patient with eating if necessary   - Allow adequate time for meals  - Recommend/ encourage appropriate diets, oral nutritional supplements, and vitamin/mineral supplements  - Order, calculate, and assess calorie counts as needed  - Recommend, monitor, and adjust tube feedings and TPN/PPN based on assessed needs  - Assess need for intravenous fluids  - Provide specific nutrition/hydration education as appropriate  - Include patient/family/caregiver in decisions related to nutrition  Outcome:  Progressing

## 2024-03-02 LAB
ANION GAP SERPL CALCULATED.3IONS-SCNC: 7 MMOL/L
BASOPHILS # BLD MANUAL: 0 THOUSAND/UL (ref 0–0.1)
BASOPHILS NFR MAR MANUAL: 0 % (ref 0–1)
BUN SERPL-MCNC: 24 MG/DL (ref 5–25)
CALCIUM SERPL-MCNC: 8.4 MG/DL (ref 8.4–10.2)
CHLORIDE SERPL-SCNC: 103 MMOL/L (ref 96–108)
CO2 SERPL-SCNC: 31 MMOL/L (ref 21–32)
CREAT SERPL-MCNC: 0.79 MG/DL (ref 0.6–1.3)
EOSINOPHIL # BLD MANUAL: 0 THOUSAND/UL (ref 0–0.4)
EOSINOPHIL NFR BLD MANUAL: 0 % (ref 0–6)
ERYTHROCYTE [DISTWIDTH] IN BLOOD BY AUTOMATED COUNT: 12.8 % (ref 11.6–15.1)
GFR SERPL CREATININE-BSD FRML MDRD: 72 ML/MIN/1.73SQ M
GLUCOSE SERPL-MCNC: 99 MG/DL (ref 65–140)
HCT VFR BLD AUTO: 31.2 % (ref 34.8–46.1)
HGB BLD-MCNC: 9.7 G/DL (ref 11.5–15.4)
LYMPHOCYTES # BLD AUTO: 0.96 THOUSAND/UL (ref 0.6–4.47)
LYMPHOCYTES # BLD AUTO: 10 % (ref 14–44)
MCH RBC QN AUTO: 30.2 PG (ref 26.8–34.3)
MCHC RBC AUTO-ENTMCNC: 31.1 G/DL (ref 31.4–37.4)
MCV RBC AUTO: 97 FL (ref 82–98)
MONOCYTES # BLD AUTO: 0.38 THOUSAND/UL (ref 0–1.22)
MONOCYTES NFR BLD: 4 % (ref 4–12)
NEUTROPHILS # BLD MANUAL: 8.23 THOUSAND/UL (ref 1.85–7.62)
NEUTS BAND NFR BLD MANUAL: 4 % (ref 0–8)
NEUTS SEG NFR BLD AUTO: 82 % (ref 43–75)
PLATELET # BLD AUTO: 213 THOUSANDS/UL (ref 149–390)
PLATELET BLD QL SMEAR: ADEQUATE
PMV BLD AUTO: 11 FL (ref 8.9–12.7)
POTASSIUM SERPL-SCNC: 3.3 MMOL/L (ref 3.5–5.3)
RBC # BLD AUTO: 3.21 MILLION/UL (ref 3.81–5.12)
RBC MORPH BLD: NORMAL
SODIUM SERPL-SCNC: 141 MMOL/L (ref 135–147)
WBC # BLD AUTO: 9.57 THOUSAND/UL (ref 4.31–10.16)

## 2024-03-02 PROCEDURE — 94668 MNPJ CHEST WALL SBSQ: CPT

## 2024-03-02 PROCEDURE — 80048 BASIC METABOLIC PNL TOTAL CA: CPT | Performed by: INTERNAL MEDICINE

## 2024-03-02 PROCEDURE — 99233 SBSQ HOSP IP/OBS HIGH 50: CPT | Performed by: INTERNAL MEDICINE

## 2024-03-02 PROCEDURE — 85027 COMPLETE CBC AUTOMATED: CPT | Performed by: INTERNAL MEDICINE

## 2024-03-02 PROCEDURE — 85007 BL SMEAR W/DIFF WBC COUNT: CPT | Performed by: INTERNAL MEDICINE

## 2024-03-02 PROCEDURE — 94640 AIRWAY INHALATION TREATMENT: CPT

## 2024-03-02 PROCEDURE — 94664 DEMO&/EVAL PT USE INHALER: CPT

## 2024-03-02 PROCEDURE — 94760 N-INVAS EAR/PLS OXIMETRY 1: CPT

## 2024-03-02 RX ORDER — LOPERAMIDE HYDROCHLORIDE 2 MG/1
2 CAPSULE ORAL 4 TIMES DAILY PRN
Status: DISCONTINUED | OUTPATIENT
Start: 2024-03-02 | End: 2024-03-07 | Stop reason: HOSPADM

## 2024-03-02 RX ORDER — POTASSIUM CHLORIDE 20 MEQ/1
40 TABLET, EXTENDED RELEASE ORAL ONCE
Status: COMPLETED | OUTPATIENT
Start: 2024-03-02 | End: 2024-03-02

## 2024-03-02 RX ADMIN — MIDODRINE HYDROCHLORIDE 2.5 MG: 5 TABLET ORAL at 17:06

## 2024-03-02 RX ADMIN — CARVEDILOL 25 MG: 12.5 TABLET, FILM COATED ORAL at 17:06

## 2024-03-02 RX ADMIN — MORPHINE SULFATE 2 MG: 2 INJECTION, SOLUTION INTRAMUSCULAR; INTRAVENOUS at 21:08

## 2024-03-02 RX ADMIN — IPRATROPIUM BROMIDE 0.5 MG: 0.5 SOLUTION RESPIRATORY (INHALATION) at 13:47

## 2024-03-02 RX ADMIN — SODIUM CHLORIDE SOLN NEBU 3% 4 ML: 3 NEBU SOLN at 13:47

## 2024-03-02 RX ADMIN — AMPICILLIN SODIUM AND SULBACTAM SODIUM 3 G: 100; 50 INJECTION, POWDER, FOR SOLUTION INTRAVENOUS at 02:17

## 2024-03-02 RX ADMIN — DOCUSATE SODIUM 100 MG: 100 CAPSULE, LIQUID FILLED ORAL at 08:09

## 2024-03-02 RX ADMIN — POTASSIUM CHLORIDE 40 MEQ: 1500 TABLET, EXTENDED RELEASE ORAL at 08:59

## 2024-03-02 RX ADMIN — ZANUBRUTINIB 160 MG: 80 CAPSULE, GELATIN COATED ORAL at 17:08

## 2024-03-02 RX ADMIN — IPRATROPIUM BROMIDE 0.5 MG: 0.5 SOLUTION RESPIRATORY (INHALATION) at 19:56

## 2024-03-02 RX ADMIN — LEVALBUTEROL HYDROCHLORIDE 1.25 MG: 1.25 SOLUTION RESPIRATORY (INHALATION) at 13:47

## 2024-03-02 RX ADMIN — BUPROPION HYDROCHLORIDE 75 MG: 75 TABLET, FILM COATED ORAL at 08:07

## 2024-03-02 RX ADMIN — VILAZODONE HYDROCHLORIDE 40 MG: 20 TABLET ORAL at 08:10

## 2024-03-02 RX ADMIN — HEPARIN SODIUM 5000 UNITS: 5000 INJECTION INTRAVENOUS; SUBCUTANEOUS at 05:09

## 2024-03-02 RX ADMIN — ZANUBRUTINIB 160 MG: 80 CAPSULE, GELATIN COATED ORAL at 08:09

## 2024-03-02 RX ADMIN — GUAIFENESIN 1200 MG: 600 TABLET ORAL at 21:11

## 2024-03-02 RX ADMIN — LORAZEPAM 0.5 MG: 0.5 TABLET ORAL at 02:22

## 2024-03-02 RX ADMIN — LEVALBUTEROL HYDROCHLORIDE 1.25 MG: 1.25 SOLUTION RESPIRATORY (INHALATION) at 19:56

## 2024-03-02 RX ADMIN — AMPICILLIN SODIUM AND SULBACTAM SODIUM 3 G: 100; 50 INJECTION, POWDER, FOR SOLUTION INTRAVENOUS at 14:33

## 2024-03-02 RX ADMIN — CARVEDILOL 25 MG: 12.5 TABLET, FILM COATED ORAL at 08:08

## 2024-03-02 RX ADMIN — MIDODRINE HYDROCHLORIDE 2.5 MG: 5 TABLET ORAL at 11:52

## 2024-03-02 RX ADMIN — BUDESONIDE AND FORMOTEROL FUMARATE DIHYDRATE 2 PUFF: 160; 4.5 AEROSOL RESPIRATORY (INHALATION) at 17:06

## 2024-03-02 RX ADMIN — GUAIFENESIN 1200 MG: 600 TABLET ORAL at 08:07

## 2024-03-02 RX ADMIN — HEPARIN SODIUM 5000 UNITS: 5000 INJECTION INTRAVENOUS; SUBCUTANEOUS at 14:33

## 2024-03-02 RX ADMIN — NYSTATIN 500000 UNITS: 100000 SUSPENSION ORAL at 17:08

## 2024-03-02 RX ADMIN — LOPERAMIDE HYDROCHLORIDE 2 MG: 2 CAPSULE ORAL at 17:08

## 2024-03-02 RX ADMIN — NYSTATIN 500000 UNITS: 100000 SUSPENSION ORAL at 11:52

## 2024-03-02 RX ADMIN — NYSTATIN 500000 UNITS: 100000 SUSPENSION ORAL at 21:11

## 2024-03-02 RX ADMIN — SODIUM CHLORIDE SOLN NEBU 3% 4 ML: 3 NEBU SOLN at 19:56

## 2024-03-02 RX ADMIN — AMPICILLIN SODIUM AND SULBACTAM SODIUM 3 G: 100; 50 INJECTION, POWDER, FOR SOLUTION INTRAVENOUS at 08:06

## 2024-03-02 RX ADMIN — Medication 3 MG: at 21:10

## 2024-03-02 RX ADMIN — AMPICILLIN SODIUM AND SULBACTAM SODIUM 3 G: 100; 50 INJECTION, POWDER, FOR SOLUTION INTRAVENOUS at 19:39

## 2024-03-02 RX ADMIN — PANTOPRAZOLE SODIUM 40 MG: 40 TABLET, DELAYED RELEASE ORAL at 08:08

## 2024-03-02 RX ADMIN — ASPIRIN 81 MG: 81 TABLET, COATED ORAL at 08:07

## 2024-03-02 RX ADMIN — PREDNISONE 30 MG: 20 TABLET ORAL at 08:07

## 2024-03-02 RX ADMIN — HEPARIN SODIUM 5000 UNITS: 5000 INJECTION INTRAVENOUS; SUBCUTANEOUS at 21:11

## 2024-03-02 RX ADMIN — PRAVASTATIN SODIUM 80 MG: 80 TABLET ORAL at 17:06

## 2024-03-02 RX ADMIN — FUROSEMIDE 20 MG: 20 TABLET ORAL at 08:07

## 2024-03-02 RX ADMIN — MIDODRINE HYDROCHLORIDE 2.5 MG: 5 TABLET ORAL at 08:07

## 2024-03-02 NOTE — ASSESSMENT & PLAN NOTE
Continue wellbutrin, vilazodone  Also takes Vraylar; recommend bringing from home as we do not have it on formulary - patient now has it and it is with pharmacy - continue

## 2024-03-02 NOTE — ASSESSMENT & PLAN NOTE
She did have Worsening Sob, wheezing, increased o2 requirements as above.   scheduled nebulizer treatment  Continue with po prednisone taper  Pulm following

## 2024-03-02 NOTE — ASSESSMENT & PLAN NOTE
Lab Results   Component Value Date    HSTNI0 2,584 (H) 02/17/2024    HSTNI2 2,049 (H) 02/17/2024    HSTNID2 -535 02/17/2024     Presented with elevated troponins, SOB, chest pain  CT chest showing no evidence of pulmonary embolism, did note mild pulmonary edema  No chest pain present, but significant SOB, anginal equivalent, sudden onset, fairly elevated troponins  As per cardiac cath report could also be Takotsubo cardiomyopathy  EKG: Sinus Tachycardia with no obvious STEMI  ADRIEN Score: 5    Initially Suspected a true type I NSTEMI but cath despite revealing CAD did not reveal lesion that could be intervened  Cardiac cath showed significant triple vessel disease with 90% stenosis in the mid LAD, 80% stenosis in the proximal LAD, 85% stenosis in the mid Cx, and 70% in the RCA with a patent mid RCA stent. Cardiology recommends maximizing medical therapy for her CAD and follow-up outpatient with a CT surgeon for a potential CABG once she fully recovers from COVID.  Patient has ischemic cardiomyopathy with reduced EF of 35%, Lifevest has been fitted today

## 2024-03-02 NOTE — ASSESSMENT & PLAN NOTE
Continue home meds; initiate lisinopril as part of NSTEMI management  Continue carvedilol  Continue lasix  Continue to monitor blood pressure

## 2024-03-02 NOTE — ASSESSMENT & PLAN NOTE
Lab Results   Component Value Date    EGFR 72 03/02/2024    EGFR 61 03/01/2024    EGFR 63 02/29/2024    CREATININE 0.79 03/02/2024    CREATININE 0.91 03/01/2024    CREATININE 0.88 02/29/2024     Pt has a history of CKD stage 2  Transitioned to po lasix 20 mg daily,   Cont to monitor bmp

## 2024-03-02 NOTE — PROGRESS NOTES
[facility]  Progress Note  Name: Lauren Quintero I  MRN: [id number]  Unit/Bed#: -01 I Date of Admission: 2/15/2024   Date of Service: 3/2/2024 I Hospital Day: 16    Assessment/Plan   * Acute on chronic respiratory failure with hypoxia (HCC)  Assessment & Plan  Currently SpO2: 95 % on Nasal Cannula O2 Flow Rate (L/min): 3 L/min    At baseline, uses 4L NC  CT Imaging showing mild pulm edema, scattered areas of tree-in-bud opacity  Continue to treat underlying NSTEMI, COVID, mild pulm edema  2/23 repeat chest xray Unchanged appearance of mild interstitial edema.Right basilar opacity likely representing small effusion with atelectasis. Please note pneumonia may demonstrate a similar appearance the appropriate clinical setting.  2/26 CXR was generally unchanged from 2/23 CXR. Continue treatment for pneumonia vs COPD exacerbation vs atelectasis. Pt continues to complain of pleuritic right lower chest pain.   Pt continues to complain of right upper abdominal/lower chest wall pain. On exam today found to have a palpable lower liver edge that was tender to palpation. Hepatic function tests and RUQ US were negative for any acute process.   IR was consulted for drainage of right lower lobe fluid accumulation, but decided the accumulation was too small to tap. Will get a repeat chest CT to evaluate effusion as well as broaden antibiotic coverage to IV Unasyn  Repeat Chest CT 2/28 was negative for PE. Showed consolidation vs. Atelectasis in right lower lobe with mucus plugging in bronchi. Pulmonology was consulted, recommend flutter valve and vest therapy as well as continue Unasyn IV, prednisone taper, daily nebs. Pt is not a good candidate for bronchoscopy b/c of her recent NSTEMI, triple vessel disease, HFreF. Superimoposed bacterial pneumonia vs. Aspiration pneumonia. Will order a swallow study to further evaluate. Procalcitonin still elevated this morning at 0.56, down from 0.92 yesterday. WBC  "down trending at 10.17 today from 15.34 yesterday.  2/29: Oxygen wean down to baseline 4L NC, pt says her right lower chest pain is much improved compared to yesterday, breathing better on exam.   2/30: Swallow study showed mild esophageal retention, can't rule out aspiration. Pt's O2 requirements at baseline, RLL pain is improving, WBC normalized to 8.82, continues to be afebrile.     Patient does have some hemoptysis on 3/1 so we will continue to monitor  Continue antibiotic therapy with Unasyn - plan for a 5 day course -if stable can consider transition to Augmentin  Continue flutter valve  Continues incentive spirometry  Continue to monitoring oxygen just enough to keep saturation around 92%  Continue modified diet  Pulmonary input noted and appreciated  If improvement hoping to discharge to rehab in the next 24 hours, patient appears overall tired and fatigued but this is somewhat a chronic issue.  I did ask her if she wants to continue with aggressive treatments moving forward, she is somewhat conflicted but at this time she told me she is not ready to \"give up\" yet.  Patient requires continued stay    Elevated troponin  Assessment & Plan  Lab Results   Component Value Date    HSTNI0 2,584 (H) 02/17/2024    HSTNI2 2,049 (H) 02/17/2024    HSTNID2 -535 02/17/2024     Presented with elevated troponins, SOB, chest pain  CT chest showing no evidence of pulmonary embolism, did note mild pulmonary edema  No chest pain present, but significant SOB, anginal equivalent, sudden onset, fairly elevated troponins  As per cardiac cath report could also be Takotsubo cardiomyopathy  EKG: Sinus Tachycardia with no obvious STEMI  ADRIEN Score: 5    Initially Suspected a true type I NSTEMI but cath despite revealing CAD did not reveal lesion that could be intervened  Cardiac cath showed significant triple vessel disease with 90% stenosis in the mid LAD, 80% stenosis in the proximal LAD, 85% stenosis in the mid Cx, and 70% in the " RCA with a patent mid RCA stent. Cardiology recommends maximizing medical therapy for her CAD and follow-up outpatient with a CT surgeon for a potential CABG once she fully recovers from COVID.  Patient has ischemic cardiomyopathy with reduced EF of 35%, Lifevest has been fitted today        COVID-19  Assessment & Plan  Lab Results   Component Value Date    SARSCOV2 Positive (A) 02/15/2024    SARSCOV2 Negative 12/12/2023    SARSCOV2 Negative 10/28/2023       Symptoms of COVID-19 SYMPTOMS: shortness of breath and Tachycardia (more than 100 beats per min)  Vaccine status: vaccinated  The patient does have acute hypoxemic respiratory failure given the same    CT chest w contrast showing Scattered areas of tree-in-bud opacity consistent with endobronchial impaction/infection  S/p remdesevir, IV steroids  Finished baricitinib  No need for further isolation  Continue to monitor    CKD (chronic kidney disease)  Assessment & Plan  Lab Results   Component Value Date    EGFR 72 03/02/2024    EGFR 61 03/01/2024    EGFR 63 02/29/2024    CREATININE 0.79 03/02/2024    CREATININE 0.91 03/01/2024    CREATININE 0.88 02/29/2024     Pt has a history of CKD stage 2  Transitioned to po lasix 20 mg daily,   Cont to monitor bmp    COPD with exacerbation (HCC)  Assessment & Plan  She did have Worsening Sob, wheezing, increased o2 requirements as above.   scheduled nebulizer treatment  Continue with po prednisone taper  Pulm following    Major depressive disorder, recurrent episode, moderate (HCC)  Assessment & Plan  Continue wellbutrin, vilazodone  Also takes Vraylar; recommend bringing from home as we do not have it on formulary - patient now has it and it is with pharmacy - continue    Essential hypertension  Assessment & Plan  Continue home meds; initiate lisinopril as part of NSTEMI management  Continue carvedilol  Continue lasix  Continue to monitor blood pressure                 VTE Pharmacologic Prophylaxis:   Pharmacologic:  Heparin  Mechanical VTE Prophylaxis in Place: Yes    Patient Centered Rounds: I have performed bedside rounds with nursing staff today.    Discussions with Specialists or Other Care Team Provider:   Discussed with care management team    Education and Discussions with Family / Patient: Patient herself - she did not want me to talk to her son today    Time Spent for Care: 45 minutes.  More than 50% of total time spent on counseling and coordination of care as described above.    Current Length of Stay: 16 day(s)    Current Patient Status: Inpatient   Certification Statement: The patient will continue to require additional inpatient hospital stay due to need for IV antibiotics    Discharge Plan: hopefully 24h    Code Status: Level 3 - DNAR and DNI      Subjective:     Patient valuated this morning.  She denies any chest pain nausea vomiting.  Patient does have some rhonchi on exam And is on her baseline oxygen requirement    Objective:     Vitals:   Temp (24hrs), Av.8 °F (36.6 °C), Min:97.5 °F (36.4 °C), Max:98 °F (36.7 °C)    Temp:  [97.5 °F (36.4 °C)-98 °F (36.7 °C)] 97.8 °F (36.6 °C)  HR:  [77-89] 79  Resp:  [18] 18  BP: (112-144)/(54-80) 144/80  SpO2:  [94 %-100 %] 95 %  Body mass index is 20.07 kg/m².     Input and Output Summary (last 24 hours):       Intake/Output Summary (Last 24 hours) at 3/2/2024 1051  Last data filed at 3/2/2024 0301  Gross per 24 hour   Intake 540 ml   Output 850 ml   Net -310 ml       Physical Exam:     Physical Exam  Vitals and nursing note reviewed.   Constitutional:       Appearance: Normal appearance. She is normal weight.      Comments: Female in bed, awake   HENT:      Head: Normocephalic and atraumatic.      Right Ear: External ear normal.      Left Ear: External ear normal.      Nose: Nose normal. No congestion.      Mouth/Throat:      Mouth: Mucous membranes are moist.      Pharynx: Oropharynx is clear. No oropharyngeal exudate or posterior oropharyngeal erythema.   Eyes:       General: No scleral icterus.        Right eye: No discharge.         Left eye: No discharge.      Extraocular Movements: Extraocular movements intact.      Conjunctiva/sclera: Conjunctivae normal.      Pupils: Pupils are equal, round, and reactive to light.   Cardiovascular:      Rate and Rhythm: Normal rate and regular rhythm.      Pulses: Normal pulses.      Heart sounds: Normal heart sounds. No murmur heard.     No friction rub. No gallop.   Pulmonary:      Effort: Pulmonary effort is normal. No respiratory distress.      Breath sounds: No stridor. Rhonchi present. No wheezing or rales.   Chest:      Chest wall: No tenderness.   Abdominal:      General: Abdomen is flat. Bowel sounds are normal. There is no distension.      Palpations: Abdomen is soft. There is no mass.      Tenderness: There is no abdominal tenderness. There is no guarding or rebound.   Musculoskeletal:         General: No swelling, tenderness, deformity or signs of injury. Normal range of motion.      Cervical back: Normal range of motion and neck supple. No rigidity. No muscular tenderness.   Skin:     General: Skin is warm and dry.      Capillary Refill: Capillary refill takes less than 2 seconds.      Coloration: Skin is not jaundiced or pale.      Findings: No bruising, erythema, lesion or rash.   Neurological:      General: No focal deficit present.      Mental Status: She is alert and oriented to person, place, and time. Mental status is at baseline.      Cranial Nerves: No cranial nerve deficit.      Sensory: No sensory deficit.      Motor: No weakness.      Coordination: Coordination normal.   Psychiatric:         Mood and Affect: Mood normal.         Behavior: Behavior normal.         Thought Content: Thought content normal.         Judgment: Judgment normal.           Additional Data:     Labs:    Results from last 7 days   Lab Units 03/02/24  0541 03/01/24  0447   WBC Thousand/uL 9.57 8.82   HEMOGLOBIN g/dL 9.7* 10.1*   HEMATOCRIT %  31.2* 33.1*   PLATELETS Thousands/uL 213 212   BANDS PCT % 4  --    NEUTROS PCT %  --  78*   LYMPHS PCT %  --  12*   LYMPHO PCT % 10*  --    MONOS PCT %  --  8   MONO PCT % 4  --    EOS PCT % 0 0     Results from last 7 days   Lab Units 03/02/24  0541 03/01/24  0447 02/29/24  0624   SODIUM mmol/L 141   < > 142   POTASSIUM mmol/L 3.3*   < > 4.4   CHLORIDE mmol/L 103   < > 105   CO2 mmol/L 31   < > 30   BUN mg/dL 24   < > 32*   CREATININE mg/dL 0.79   < > 0.88   ANION GAP mmol/L 7   < > 7   CALCIUM mg/dL 8.4   < > 9.0   ALBUMIN g/dL  --   --  3.1*   TOTAL BILIRUBIN mg/dL  --   --  0.36   ALK PHOS U/L  --   --  69   ALT U/L  --   --  6*   AST U/L  --   --  12*   GLUCOSE RANDOM mg/dL 99   < > 105    < > = values in this interval not displayed.                 Results from last 7 days   Lab Units 02/29/24  0624 02/28/24  0517 02/26/24  0438 02/25/24  0524   PROCALCITONIN ng/ml 0.56* 0.92* 0.24 0.30*           * I Have Reviewed All Lab Data Listed Above.  * Additional Pertinent Lab Tests Reviewed: All Labs For Current Hospital Admission Reviewed      Recent Cultures (last 7 days):     Results from last 7 days   Lab Units 02/28/24  1342   LEGIONELLA URINARY ANTIGEN  Negative       Last 24 Hours Medication List:   Current Facility-Administered Medications   Medication Dose Route Frequency Provider Last Rate    acetaminophen  975 mg Oral Q8H PRN Vasu Ramos PA-C      albuterol  2 puff Inhalation Q4H PRN Vasu Ramos PA-C      albuterol  2.5 mg Nebulization Q4H PRN Vasu Ramos PA-C      aluminum-magnesium hydroxide-simethicone  30 mL Oral Q6H PRN Vasu Ramos PA-C      ampicillin-sulbactam  3 g Intravenous Q6H Kenny Stone MD 3 g (03/02/24 0806)    aspirin  81 mg Oral Daily Vasu Ramos PA-C      budesonide-formoterol  2 puff Inhalation BID Vasu Ramos PA-C      buPROPion  75 mg Oral Daily Vasu Ramos PA-C      carvedilol  25 mg Oral BID  With Meals Vasu Ramos PA-C      docusate sodium  100 mg Oral BID Vasu Ramos PA-C      furosemide  20 mg Oral Daily Alcides Rutherford MD      guaiFENesin  1,200 mg Oral Q12H [facility] Latia Dominique Saraiya, DO      heparin (porcine)  5,000 Units Subcutaneous Q8H [facility] Alcides Rutherford MD      ipratropium  0.5 mg Nebulization TID Herve Jerez PA-C      labetalol  10 mg Intravenous Q6H PRN Vasu Ramos PA-C      levalbuterol  1.25 mg Nebulization TID Herve Jerez PA-C      lidocaine  2 patch Topical Daily PRN Vasu Ramos PA-C      LORazepam  0.5 mg Oral BID PRN Latia Dominique Sophia, DO      melatonin  3 mg Oral HS PRN Vasu Ramos PA-C      midodrine  2.5 mg Oral TID AC Vasu Ramos PA-C      morphine injection  2 mg Intravenous Q6H PRN Kenny Alan MD      nitroglycerin  0.4 mg Sublingual Q5 Min PRN Vasu Ramos PA-C      ondansetron  4 mg Intravenous Q6H PRN Vasu Ramos PA-C      pantoprazole  40 mg Oral Daily Vasu Ramos PA-C      pravastatin  80 mg Oral QPM Vasu Ramos PA-C      predniSONE  30 mg Oral Daily Herve Jerez PA-C      sodium chloride  4 mL Nebulization TID Herve Jerez PA-C      vilazodone  40 mg Oral Daily With Breakfast Vasu Ramos PA-C      Zanubrutinib  160 mg Oral BID Vasu Ramos PA-C          Today, Patient Was Seen By: Kenny Alan MD    ** Please Note: Dictation voice to text software may have been used in the creation of this document. **

## 2024-03-02 NOTE — PLAN OF CARE
Problem: INFECTION - ADULT  Goal: Absence or prevention of progression during hospitalization  Description: INTERVENTIONS:  - Assess and monitor for signs and symptoms of infection  - Monitor lab/diagnostic results  - Monitor all insertion sites, i.e. indwelling lines, tubes, and drains  - Monitor endotracheal if appropriate and nasal secretions for changes in amount and color  - [place] appropriate cooling/warming therapies per order  - Administer medications as ordered  - Instruct and encourage patient and family to use good hand hygiene technique  - Identify and instruct in appropriate isolation precautions for identified infection/condition  Outcome: Progressing  Goal: Absence of fever/infection during neutropenic period  Description: INTERVENTIONS:  - Monitor WBC    Outcome: Progressing

## 2024-03-02 NOTE — ASSESSMENT & PLAN NOTE
Lab Results   Component Value Date    SARSCOV2 Positive (A) 02/15/2024    SARSCOV2 Negative 12/12/2023    SARSCOV2 Negative 10/28/2023       Symptoms of COVID-19 SYMPTOMS: shortness of breath and Tachycardia (more than 100 beats per min)  Vaccine status: vaccinated  The patient does have acute hypoxemic respiratory failure given the same    CT chest w contrast showing Scattered areas of tree-in-bud opacity consistent with endobronchial impaction/infection  S/p remdesevir, IV steroids  Finished baricitinib  No need for further isolation  Continue to monitor

## 2024-03-02 NOTE — PLAN OF CARE
Problem: Potential for Falls  Goal: Patient will remain free of falls  Description: INTERVENTIONS:  - Educate patient/family on patient safety including physical limitations  - Instruct patient to call for assistance with activity   - Consult OT/PT to assist with strengthening/mobility   - Keep Call bell within reach  - Keep bed low and locked with side rails adjusted as appropriate  - Keep care items and personal belongings within reach  - Initiate and maintain comfort rounds  - Make Fall Risk Sign visible to staff  - Offer Toileting every  Hours, in advance of need  - Initiate/Maintain alarm  - Obtain necessary fall risk management equipment:   - Apply yellow socks and bracelet for high fall risk patients  - Consider moving patient to room near nurses station  Outcome: Progressing     Problem: RESPIRATORY - ADULT  Goal: Achieves optimal ventilation and oxygenation  Description: INTERVENTIONS:  - Assess for changes in respiratory status  - Assess for changes in mentation and behavior  - Position to facilitate oxygenation and minimize respiratory effort  - Oxygen administered by appropriate delivery if ordered  - Initiate smoking cessation education as indicated  - Encourage broncho-pulmonary hygiene including cough, deep breathe, Incentive Spirometry  - Assess the need for suctioning and aspirate as needed  - Assess and instruct to report SOB or any respiratory difficulty  - Respiratory Therapy support as indicated  Outcome: Progressing     Problem: Prexisting or High Potential for Compromised Skin Integrity  Goal: Skin integrity is maintained or improved  Description: INTERVENTIONS:  - Identify patients at risk for skin breakdown  - Assess and monitor skin integrity  - Assess and monitor nutrition and hydration status  - Monitor labs   - Assess for incontinence   - Turn and reposition patient  - Assist with mobility/ambulation  - Relieve pressure over bony prominences  - Avoid friction and shearing  - Provide  Pt presents stating he has diarrhea most of the time - started in July Prior to July his normal bowel habit was 1 BM a day in the am - formed now he is having multiple stools a day he may see a little discoloration that may be blood in the am but not during the rest of the day no abd pain no nausea or emesis no fevers or chills he has not had any stool studies done and has not tried any otc meds no new medications when this started, no recent travel he has never had a colonoscopy before no family hx colon ca or polyps he has had some unintentional weight loss - 27 pounds over the past month - he has changed his diet where he is eating less salty, crunchy food like potato chips and popcorn - now doing more yogurt, fruit, cereal appropriate hygiene as needed including keeping skin clean and dry  - Evaluate need for skin moisturizer/barrier cream  - Collaborate with interdisciplinary team   - Patient/family teaching  - Consider wound care consult   Outcome: Progressing     Problem: PAIN - ADULT  Goal: Verbalizes/displays adequate comfort level or baseline comfort level  Description: Interventions:  - Encourage patient to monitor pain and request assistance  - Assess pain using appropriate pain scale  - Administer analgesics based on type and severity of pain and evaluate response  - Implement non-pharmacological measures as appropriate and evaluate response  - Consider cultural and social influences on pain and pain management  - Notify physician/advanced practitioner if interventions unsuccessful or patient reports new pain  Outcome: Progressing     Problem: INFECTION - ADULT  Goal: Absence or prevention of progression during hospitalization  Description: INTERVENTIONS:  - Assess and monitor for signs and symptoms of infection  - Monitor lab/diagnostic results  - Monitor all insertion sites, i.e. indwelling lines, tubes, and drains  - Monitor endotracheal if appropriate and nasal secretions for changes in amount and color  - [place] appropriate cooling/warming therapies per order  - Administer medications as ordered  - Instruct and encourage patient and family to use good hand hygiene technique  - Identify and instruct in appropriate isolation precautions for identified infection/condition  Outcome: Progressing  Goal: Absence of fever/infection during neutropenic period  Description: INTERVENTIONS:  - Monitor WBC    Outcome: Progressing     Problem: SAFETY ADULT  Goal: Patient will remain free of falls  Description: INTERVENTIONS:  - Educate patient/family on patient safety including physical limitations  - Instruct patient to call for assistance with activity   - Consult OT/PT to assist with strengthening/mobility   - Keep Call bell  within reach  - Keep bed low and locked with side rails adjusted as appropriate  - Keep care items and personal belongings within reach  - Initiate and maintain comfort rounds  - Make Fall Risk Sign visible to staff  - Offer Toileting every  Hours, in advance of need  - Initiate/Maintainalarm  - Obtain necessary fall risk management equipment:   - Apply yellow socks and bracelet for high fall risk patients  - Consider moving patient to room near nurses station  Outcome: Progressing  Goal: Maintain or return to baseline ADL function  Description: INTERVENTIONS:  -  Assess patient's ability to carry out ADLs; assess patient's baseline for ADL function and identify physical deficits which impact ability to perform ADLs (bathing, care of mouth/teeth, toileting, grooming, dressing, etc.)  - Assess/evaluate cause of self-care deficits   - Assess range of motion  - Assess patient's mobility; develop plan if impaired  - Assess patient's need for assistive devices and provide as appropriate  - Encourage maximum independence but intervene and supervise when necessary  - Involve family in performance of ADLs  - Assess for home care needs following discharge   - Consider OT consult to assist with ADL evaluation and planning for discharge  - Provide patient education as appropriate  Outcome: Progressing  Goal: Maintains/Returns to pre admission functional level  Description: INTERVENTIONS:  - Perform AM-PAC 6 Click Basic Mobility/ Daily Activity assessment daily.  - Set and communicate daily mobility goal to care team and patient/family/caregiver.   - Collaborate with rehabilitation services on mobility goals if consulted  - Perform Range of Motion  times a day.  - Reposition patient every  hours.  - Dangle patient  times a day  - Stand patient times a day  - Ambulate patient  times a day  - Out of bed to chair  times a day   - Out of bed for meals  times a day  - Out of bed for toileting  - Record patient progress and  toleration of activity level   Outcome: Progressing     Problem: DISCHARGE PLANNING  Goal: Discharge to home or other facility with appropriate resources  Description: INTERVENTIONS:  - Identify barriers to discharge w/patient and caregiver  - Arrange for needed discharge resources and transportation as appropriate  - Identify discharge learning needs (meds, wound care, etc.)  - Arrange for interpretive services to assist at discharge as needed  - Refer to Case Management Department for coordinating discharge planning if the patient needs post-hospital services based on physician/advanced practitioner order or complex needs related to functional status, cognitive ability, or social support system  Outcome: Progressing     Problem: Knowledge Deficit  Goal: Patient/family/caregiver demonstrates understanding of disease process, treatment plan, medications, and discharge instructions  Description: Complete learning assessment and assess knowledge base.  Interventions:  - Provide teaching at level of understanding  - Provide teaching via preferred learning methods  Outcome: Progressing     Problem: MOBILITY - ADULT  Goal: Maintain or return to baseline ADL function  Description: INTERVENTIONS:  -  Assess patient's ability to carry out ADLs; assess patient's baseline for ADL function and identify physical deficits which impact ability to perform ADLs (bathing, care of mouth/teeth, toileting, grooming, dressing, etc.)  - Assess/evaluate cause of self-care deficits   - Assess range of motion  - Assess patient's mobility; develop plan if impaired  - Assess patient's need for assistive devices and provide as appropriate  - Encourage maximum independence but intervene and supervise when necessary  - Involve family in performance of ADLs  - Assess for home care needs following discharge   - Consider OT consult to assist with ADL evaluation and planning for discharge  - Provide patient education as appropriate  Outcome:  Progressing  Goal: Maintains/Returns to pre admission functional level  Description: INTERVENTIONS:  - Perform AM-PAC 6 Click Basic Mobility/ Daily Activity assessment daily.  - Set and communicate daily mobility goal to care team and patient/family/caregiver.   - Collaborate with rehabilitation services on mobility goals if consulted  - Perform Range of Motion  times a day.  - Reposition patient every  hours.  - Dangle patient times a day  - Stand patient  times a day  - Ambulate patient  times a day  - Out of bed to chair  times a day   - Out of bed for meals  times a day  - Out of bed for toileting  - Record patient progress and toleration of activity level   Outcome: Progressing     Problem: Nutrition/Hydration-ADULT  Goal: Nutrient/Hydration intake appropriate for improving, restoring or maintaining nutritional needs  Description: Monitor and assess patient's nutrition/hydration status for malnutrition. Collaborate with interdisciplinary team and initiate plan and interventions as ordered.  Monitor patient's weight and dietary intake as ordered or per policy. Utilize nutrition screening tool and intervene as necessary. Determine patient's food preferences and provide high-protein, high-caloric foods as appropriate.     INTERVENTIONS:  - Monitor oral intake, urinary output, labs, and treatment plans  - Assess nutrition and hydration status and recommend course of action  - Evaluate amount of meals eaten  - Assist patient with eating if necessary   - Allow adequate time for meals  - Recommend/ encourage appropriate diets, oral nutritional supplements, and vitamin/mineral supplements  - Order, calculate, and assess calorie counts as needed  - Recommend, monitor, and adjust tube feedings and TPN/PPN based on assessed needs  - Assess need for intravenous fluids  - Provide specific nutrition/hydration education as appropriate  - Include patient/family/caregiver in decisions related to nutrition  Outcome:  Progressing

## 2024-03-02 NOTE — ASSESSMENT & PLAN NOTE
Currently SpO2: 95 % on Nasal Cannula O2 Flow Rate (L/min): 3 L/min    At baseline, uses 4L NC  CT Imaging showing mild pulm edema, scattered areas of tree-in-bud opacity  Continue to treat underlying NSTEMI, COVID, mild pulm edema  2/23 repeat chest xray Unchanged appearance of mild interstitial edema.Right basilar opacity likely representing small effusion with atelectasis. Please note pneumonia may demonstrate a similar appearance the appropriate clinical setting.  2/26 CXR was generally unchanged from 2/23 CXR. Continue treatment for pneumonia vs COPD exacerbation vs atelectasis. Pt continues to complain of pleuritic right lower chest pain.   Pt continues to complain of right upper abdominal/lower chest wall pain. On exam today found to have a palpable lower liver edge that was tender to palpation. Hepatic function tests and RUQ US were negative for any acute process.   IR was consulted for drainage of right lower lobe fluid accumulation, but decided the accumulation was too small to tap. Will get a repeat chest CT to evaluate effusion as well as broaden antibiotic coverage to IV Unasyn  Repeat Chest CT 2/28 was negative for PE. Showed consolidation vs. Atelectasis in right lower lobe with mucus plugging in bronchi. Pulmonology was consulted, recommend flutter valve and vest therapy as well as continue Unasyn IV, prednisone taper, daily nebs. Pt is not a good candidate for bronchoscopy b/c of her recent NSTEMI, triple vessel disease, HFreF. Superimoposed bacterial pneumonia vs. Aspiration pneumonia. Will order a swallow study to further evaluate. Procalcitonin still elevated this morning at 0.56, down from 0.92 yesterday. WBC down trending at 10.17 today from 15.34 yesterday.  2/29: Oxygen wean down to baseline 4L NC, pt says her right lower chest pain is much improved compared to yesterday, breathing better on exam.   2/30: Swallow study showed mild esophageal retention, can't rule out aspiration. Pt's O2  "requirements at baseline, RLL pain is improving, WBC normalized to 8.82, continues to be afebrile.     Patient does have some hemoptysis on 3/1 so we will continue to monitor  Continue antibiotic therapy with Unasyn - plan for a 5 day course -if stable can consider transition to Augmentin  Continue flutter valve  Continues incentive spirometry  Continue to monitoring oxygen just enough to keep saturation around 92%  Continue modified diet  Pulmonary input noted and appreciated  If improvement hoping to discharge to rehab in the next 24 hours, patient appears overall tired and fatigued but this is somewhat a chronic issue.  I did ask her if she wants to continue with aggressive treatments moving forward, she is somewhat conflicted but at this time she told me she is not ready to \"give up\" yet.  Patient requires continued stay  "

## 2024-03-03 LAB
ANION GAP SERPL CALCULATED.3IONS-SCNC: 5 MMOL/L
BUN SERPL-MCNC: 23 MG/DL (ref 5–25)
CALCIUM SERPL-MCNC: 8.7 MG/DL (ref 8.4–10.2)
CHLORIDE SERPL-SCNC: 105 MMOL/L (ref 96–108)
CO2 SERPL-SCNC: 32 MMOL/L (ref 21–32)
CREAT SERPL-MCNC: 0.78 MG/DL (ref 0.6–1.3)
ERYTHROCYTE [DISTWIDTH] IN BLOOD BY AUTOMATED COUNT: 13 % (ref 11.6–15.1)
GFR SERPL CREATININE-BSD FRML MDRD: 73 ML/MIN/1.73SQ M
GLUCOSE SERPL-MCNC: 95 MG/DL (ref 65–140)
HCT VFR BLD AUTO: 31.2 % (ref 34.8–46.1)
HGB BLD-MCNC: 9.9 G/DL (ref 11.5–15.4)
MCH RBC QN AUTO: 30.7 PG (ref 26.8–34.3)
MCHC RBC AUTO-ENTMCNC: 31.7 G/DL (ref 31.4–37.4)
MCV RBC AUTO: 97 FL (ref 82–98)
NRBC BLD AUTO-RTO: 0 /100 WBCS
PLATELET # BLD AUTO: 215 THOUSANDS/UL (ref 149–390)
PMV BLD AUTO: 10.6 FL (ref 8.9–12.7)
POTASSIUM SERPL-SCNC: 3.8 MMOL/L (ref 3.5–5.3)
RBC # BLD AUTO: 3.23 MILLION/UL (ref 3.81–5.12)
SODIUM SERPL-SCNC: 142 MMOL/L (ref 135–147)
WBC # BLD AUTO: 11.51 THOUSAND/UL (ref 4.31–10.16)

## 2024-03-03 PROCEDURE — 85027 COMPLETE CBC AUTOMATED: CPT | Performed by: INTERNAL MEDICINE

## 2024-03-03 PROCEDURE — 99233 SBSQ HOSP IP/OBS HIGH 50: CPT | Performed by: INTERNAL MEDICINE

## 2024-03-03 PROCEDURE — 94640 AIRWAY INHALATION TREATMENT: CPT

## 2024-03-03 PROCEDURE — 80048 BASIC METABOLIC PNL TOTAL CA: CPT | Performed by: INTERNAL MEDICINE

## 2024-03-03 PROCEDURE — 94668 MNPJ CHEST WALL SBSQ: CPT

## 2024-03-03 PROCEDURE — 94760 N-INVAS EAR/PLS OXIMETRY 1: CPT

## 2024-03-03 PROCEDURE — 94664 DEMO&/EVAL PT USE INHALER: CPT

## 2024-03-03 RX ORDER — GUAIFENESIN/DEXTROMETHORPHAN 100-10MG/5
10 SYRUP ORAL EVERY 4 HOURS PRN
Status: DISCONTINUED | OUTPATIENT
Start: 2024-03-03 | End: 2024-03-07 | Stop reason: HOSPADM

## 2024-03-03 RX ADMIN — AMPICILLIN SODIUM AND SULBACTAM SODIUM 3 G: 100; 50 INJECTION, POWDER, FOR SOLUTION INTRAVENOUS at 20:01

## 2024-03-03 RX ADMIN — Medication 3 MG: at 21:31

## 2024-03-03 RX ADMIN — IPRATROPIUM BROMIDE 0.5 MG: 0.5 SOLUTION RESPIRATORY (INHALATION) at 19:16

## 2024-03-03 RX ADMIN — MORPHINE SULFATE 2 MG: 2 INJECTION, SOLUTION INTRAMUSCULAR; INTRAVENOUS at 02:48

## 2024-03-03 RX ADMIN — PRAVASTATIN SODIUM 80 MG: 80 TABLET ORAL at 17:19

## 2024-03-03 RX ADMIN — IPRATROPIUM BROMIDE 0.5 MG: 0.5 SOLUTION RESPIRATORY (INHALATION) at 07:11

## 2024-03-03 RX ADMIN — ZANUBRUTINIB 160 MG: 80 CAPSULE, GELATIN COATED ORAL at 08:20

## 2024-03-03 RX ADMIN — IPRATROPIUM BROMIDE 0.5 MG: 0.5 SOLUTION RESPIRATORY (INHALATION) at 13:48

## 2024-03-03 RX ADMIN — LEVALBUTEROL HYDROCHLORIDE 1.25 MG: 1.25 SOLUTION RESPIRATORY (INHALATION) at 13:48

## 2024-03-03 RX ADMIN — MORPHINE SULFATE 2 MG: 2 INJECTION, SOLUTION INTRAMUSCULAR; INTRAVENOUS at 21:32

## 2024-03-03 RX ADMIN — MIDODRINE HYDROCHLORIDE 2.5 MG: 5 TABLET ORAL at 08:18

## 2024-03-03 RX ADMIN — ALBUTEROL SULFATE 2.5 MG: 2.5 SOLUTION RESPIRATORY (INHALATION) at 04:06

## 2024-03-03 RX ADMIN — ACETAMINOPHEN 975 MG: 325 TABLET, FILM COATED ORAL at 01:53

## 2024-03-03 RX ADMIN — SODIUM CHLORIDE SOLN NEBU 3% 4 ML: 3 NEBU SOLN at 13:48

## 2024-03-03 RX ADMIN — CARVEDILOL 25 MG: 12.5 TABLET, FILM COATED ORAL at 08:17

## 2024-03-03 RX ADMIN — VILAZODONE HYDROCHLORIDE 40 MG: 20 TABLET ORAL at 08:20

## 2024-03-03 RX ADMIN — MIDODRINE HYDROCHLORIDE 2.5 MG: 5 TABLET ORAL at 12:04

## 2024-03-03 RX ADMIN — CARVEDILOL 25 MG: 12.5 TABLET, FILM COATED ORAL at 17:19

## 2024-03-03 RX ADMIN — NYSTATIN 500000 UNITS: 100000 SUSPENSION ORAL at 12:03

## 2024-03-03 RX ADMIN — FUROSEMIDE 20 MG: 20 TABLET ORAL at 08:18

## 2024-03-03 RX ADMIN — GUAIFENESIN AND DEXTROMETHORPHAN 10 ML: 100; 10 SYRUP ORAL at 09:29

## 2024-03-03 RX ADMIN — HEPARIN SODIUM 5000 UNITS: 5000 INJECTION INTRAVENOUS; SUBCUTANEOUS at 05:02

## 2024-03-03 RX ADMIN — BUDESONIDE AND FORMOTEROL FUMARATE DIHYDRATE 2 PUFF: 160; 4.5 AEROSOL RESPIRATORY (INHALATION) at 08:15

## 2024-03-03 RX ADMIN — LEVALBUTEROL HYDROCHLORIDE 1.25 MG: 1.25 SOLUTION RESPIRATORY (INHALATION) at 19:16

## 2024-03-03 RX ADMIN — AMPICILLIN SODIUM AND SULBACTAM SODIUM 3 G: 100; 50 INJECTION, POWDER, FOR SOLUTION INTRAVENOUS at 01:47

## 2024-03-03 RX ADMIN — LORAZEPAM 0.5 MG: 0.5 TABLET ORAL at 00:28

## 2024-03-03 RX ADMIN — LORAZEPAM 0.5 MG: 0.5 TABLET ORAL at 17:19

## 2024-03-03 RX ADMIN — LEVALBUTEROL HYDROCHLORIDE 1.25 MG: 1.25 SOLUTION RESPIRATORY (INHALATION) at 07:11

## 2024-03-03 RX ADMIN — AMPICILLIN SODIUM AND SULBACTAM SODIUM 3 G: 100; 50 INJECTION, POWDER, FOR SOLUTION INTRAVENOUS at 14:10

## 2024-03-03 RX ADMIN — PREDNISONE 30 MG: 20 TABLET ORAL at 08:17

## 2024-03-03 RX ADMIN — SODIUM CHLORIDE SOLN NEBU 3% 4 ML: 3 NEBU SOLN at 19:16

## 2024-03-03 RX ADMIN — ASPIRIN 81 MG: 81 TABLET, COATED ORAL at 08:17

## 2024-03-03 RX ADMIN — PANTOPRAZOLE SODIUM 40 MG: 40 TABLET, DELAYED RELEASE ORAL at 08:17

## 2024-03-03 RX ADMIN — GUAIFENESIN 1200 MG: 600 TABLET ORAL at 08:17

## 2024-03-03 RX ADMIN — AMPICILLIN SODIUM AND SULBACTAM SODIUM 3 G: 100; 50 INJECTION, POWDER, FOR SOLUTION INTRAVENOUS at 08:20

## 2024-03-03 RX ADMIN — SODIUM CHLORIDE SOLN NEBU 3% 4 ML: 3 NEBU SOLN at 07:11

## 2024-03-03 RX ADMIN — MIDODRINE HYDROCHLORIDE 2.5 MG: 5 TABLET ORAL at 17:19

## 2024-03-03 RX ADMIN — BUPROPION HYDROCHLORIDE 75 MG: 75 TABLET, FILM COATED ORAL at 08:17

## 2024-03-03 RX ADMIN — ZANUBRUTINIB 160 MG: 80 CAPSULE, GELATIN COATED ORAL at 17:18

## 2024-03-03 RX ADMIN — HEPARIN SODIUM 5000 UNITS: 5000 INJECTION INTRAVENOUS; SUBCUTANEOUS at 14:10

## 2024-03-03 RX ADMIN — HEPARIN SODIUM 5000 UNITS: 5000 INJECTION INTRAVENOUS; SUBCUTANEOUS at 21:27

## 2024-03-03 RX ADMIN — GUAIFENESIN AND DEXTROMETHORPHAN 10 ML: 100; 10 SYRUP ORAL at 17:19

## 2024-03-03 NOTE — ASSESSMENT & PLAN NOTE
Lab Results   Component Value Date    EGFR 73 03/03/2024    EGFR 72 03/02/2024    EGFR 61 03/01/2024    CREATININE 0.78 03/03/2024    CREATININE 0.79 03/02/2024    CREATININE 0.91 03/01/2024     Pt has a history of CKD stage 2  Transitioned to po lasix 20 mg daily,   Cont to monitor bmp

## 2024-03-03 NOTE — RESPIRATORY THERAPY NOTE
RT Protocol Note  Lauren Quintero 78 y.o. female MRN: [id number]  Unit/Bed#: -01 Encounter: [id number]    Assessment    Principal Problem:    Acute on chronic respiratory failure with hypoxia (HCC)  Active Problems:    Essential hypertension    Major depressive disorder, recurrent episode, moderate (HCC)    COPD with exacerbation (HCC)    COVID-19    CKD (chronic kidney disease)    Elevated troponin      Home Pulmonary Medications:     03/03/24 1348   Respiratory Protocol   Protocol Initiated? No   Protocol Selection Respiratory   Language Barrier? No   Medical & Social History Reviewed? Yes   Diagnostic Studies Reviewed? Yes   Physical Assessment Performed? Yes   Home Devices/Therapy Home O2   Respiratory Plan Home Bronchodilator Patient pathway   Respiratory Assessment   Assessment Type Pre-treatment   General Appearance Alert;Awake   Respiratory Pattern Normal   Chest Assessment Chest expansion symmetrical   Resp Comments continue homwe regimen   O2 Device nc   Additional Assessments   Pulse 93   SpO2 92 %       Home Devices/Therapy: Home O2    Past Medical History:   Diagnosis Date    Acute congestive heart failure (HCC) 8/27/2021    Anxiety     Cardiac disease     Chest pain 8/27/2021    Chronic pain disorder     COPD (chronic obstructive pulmonary disease) (HCC)     Depression     Heart disease     Hyperlipidemia     Hypertension     MI (myocardial infarction) (HCC)     MRSA (methicillin resistant Staphylococcus aureus)     Renal disorder     benign kidney tumor     Social History     Socioeconomic History    Marital status:      Spouse name: None    Number of children: 3    Years of education: 13    Highest education level: High school graduate   Occupational History    Occupation:      Employer: [employer]     Comment: retired    Tobacco Use    Smoking status: Former     Current packs/day: 0.00     Average packs/day: 1 pack/day for 60.0 years (60.0 ttl pk-yrs)      Types: Cigarettes     Start date:      Quit date: 2016     Years since quittin.1    Smokeless tobacco: Never    Tobacco comments:     She has close to a 60 pack-year smoking history, most recently has cut down to 4-5 cigarettes daily   Vaping Use    Vaping status: Never Used   Substance and Sexual Activity    Alcohol use: Never    Drug use: No    Sexual activity: Not Currently   Other Topics Concern    None   Social History Narrative    None     Social Determinants of Health     Financial Resource Strain: Patient Declined (2024)    Received from [facility]    Overall Financial Resource Strain (CARDIA)     Difficulty of Paying Living Expenses: Patient declined   Food Insecurity: No Food Insecurity (2024)    Hunger Vital Sign     Worried About Running Out of Food in the Last Year: Never true     Ran Out of Food in the Last Year: Never true   Transportation Needs: No Transportation Needs (2024)    PRAPARE - Transportation     Lack of Transportation (Medical): No     Lack of Transportation (Non-Medical): No   Physical Activity: Patient Declined (2024)    Received from [facility]    Exercise Vital Sign     Days of Exercise per Week: Patient declined     Minutes of Exercise per Session: Patient declined   Stress: Patient Declined (2024)    Received from [facility]    [place] [employer] - Occupational Stress Questionnaire     Feeling of Stress : Patient declined   Social Connections: Patient Declined (2024)    Received from [facility]    Social Connection and Isolation Panel [NHANES]     Frequency of Communication with Friends and Family: Patient declined     Frequency of Social Gatherings with Friends and Family: Patient declined     Attends [employer] Services: Patient declined     Active Member of Clubs or Organizations: Patient declined     Attends Club or Organization Meetings: Patient declined     Marital Status: Patient declined  "  Intimate Partner Violence: Patient Declined (1/6/2024)    Received from [facility]    Humiliation, Afraid, Rape, and Kick questionnaire     Fear of Current or Ex-Partner: Patient declined     Emotionally Abused: Patient declined     Physically Abused: Patient declined     Sexually Abused: Patient declined   Housing Stability: Low Risk  (2/19/2024)    Housing Stability Vital Sign     Unable to Pay for Housing in the Last Year: No     Number of Places Lived in the Last Year: 2     Unstable Housing in the Last Year: No       Subjective    Subjective Data: awake and alert    Objective    Physical Exam:   Assessment Type: Pre-treatment  General Appearance: Alert, Awake  Respiratory Pattern: Normal  Chest Assessment: Chest expansion symmetrical  Bilateral Breath Sounds: Diminished  Cough: None  O2 Device: nc    Vitals:  Blood pressure 136/72, pulse 93, temperature 98.4 °F (36.9 °C), resp. rate 18, height 5' 8\" (1.727 m), weight 59.9 kg (132 lb), SpO2 92%, not currently breastfeeding.          Imaging and other studies: I have personally reviewed pertinent reports.      O2 Device: nc     Plan    Respiratory Plan: Home Bronchodilator Patient pathway        Resp Comments: continue homwe regimen   "

## 2024-03-03 NOTE — ASSESSMENT & PLAN NOTE
Continue home meds; initiate lisinopril as part of NSTEMI management  Continue carvedilol 25mg PO BID  Continue furosemide 20mg qD which is a chronic medicine  Continue to monitor blood pressure

## 2024-03-03 NOTE — PLAN OF CARE
Problem: Potential for Falls  Goal: Patient will remain free of falls  Description: INTERVENTIONS:  - Educate patient/family on patient safety including physical limitations  - Instruct patient to call for assistance with activity   - Consult OT/PT to assist with strengthening/mobility   - Keep Call bell within reach  - Keep bed low and locked with side rails adjusted as appropriate  - Keep care items and personal belongings within reach  - Initiate and maintain comfort rounds  - Make Fall Risk Sign visible to staff  - Offer Toileting every  Hours, in advance of need  - Initiate/Maintain alarm  - Obtain necessary fall risk management equipment:  - Apply yellow socks and bracelet for high fall risk patients  - Consider moving patient to room near nurses station  Outcome: Progressing     Problem: RESPIRATORY - ADULT  Goal: Achieves optimal ventilation and oxygenation  Description: INTERVENTIONS:  - Assess for changes in respiratory status  - Assess for changes in mentation and behavior  - Position to facilitate oxygenation and minimize respiratory effort  - Oxygen administered by appropriate delivery if ordered  - Initiate smoking cessation education as indicated  - Encourage broncho-pulmonary hygiene including cough, deep breathe, Incentive Spirometry  - Assess the need for suctioning and aspirate as needed  - Assess and instruct to report SOB or any respiratory difficulty  - Respiratory Therapy support as indicated  Outcome: Progressing     Problem: Prexisting or High Potential for Compromised Skin Integrity  Goal: Skin integrity is maintained or improved  Description: INTERVENTIONS:  - Identify patients at risk for skin breakdown  - Assess and monitor skin integrity  - Assess and monitor nutrition and hydration status  - Monitor labs   - Assess for incontinence   - Turn and reposition patient  - Assist with mobility/ambulation  - Relieve pressure over bony prominences  - Avoid friction and shearing  - Provide  appropriate hygiene as needed including keeping skin clean and dry  - Evaluate need for skin moisturizer/barrier cream  - Collaborate with interdisciplinary team   - Patient/family teaching  - Consider wound care consult   Outcome: Progressing     Problem: PAIN - ADULT  Goal: Verbalizes/displays adequate comfort level or baseline comfort level  Description: Interventions:  - Encourage patient to monitor pain and request assistance  - Assess pain using appropriate pain scale  - Administer analgesics based on type and severity of pain and evaluate response  - Implement non-pharmacological measures as appropriate and evaluate response  - Consider cultural and social influences on pain and pain management  - Notify physician/advanced practitioner if interventions unsuccessful or patient reports new pain  Outcome: Progressing     Problem: INFECTION - ADULT  Goal: Absence or prevention of progression during hospitalization  Description: INTERVENTIONS:  - Assess and monitor for signs and symptoms of infection  - Monitor lab/diagnostic results  - Monitor all insertion sites, i.e. indwelling lines, tubes, and drains  - Monitor endotracheal if appropriate and nasal secretions for changes in amount and color  - [place] appropriate cooling/warming therapies per order  - Administer medications as ordered  - Instruct and encourage patient and family to use good hand hygiene technique  - Identify and instruct in appropriate isolation precautions for identified infection/condition  Outcome: Progressing  Goal: Absence of fever/infection during neutropenic period  Description: INTERVENTIONS:  - Monitor WBC    Outcome: Progressing     Problem: SAFETY ADULT  Goal: Patient will remain free of falls  Description: INTERVENTIONS:  - Educate patient/family on patient safety including physical limitations  - Instruct patient to call for assistance with activity   - Consult OT/PT to assist with strengthening/mobility   - Keep Call bell  within reach  - Keep bed low and locked with side rails adjusted as appropriate  - Keep care items and personal belongings within reach  - Initiate and maintain comfort rounds  - Make Fall Risk Sign visible to staff  - Offer Toileting every  Hours, in advance of need  - Initiate/Maintain alarm  - Obtain necessary fall risk management equipment:   - Apply yellow socks and bracelet for high fall risk patients  - Consider moving patient to room near nurses station  Outcome: Progressing  Goal: Maintain or return to baseline ADL function  Description: INTERVENTIONS:  -  Assess patient's ability to carry out ADLs; assess patient's baseline for ADL function and identify physical deficits which impact ability to perform ADLs (bathing, care of mouth/teeth, toileting, grooming, dressing, etc.)  - Assess/evaluate cause of self-care deficits   - Assess range of motion  - Assess patient's mobility; develop plan if impaired  - Assess patient's need for assistive devices and provide as appropriate  - Encourage maximum independence but intervene and supervise when necessary  - Involve family in performance of ADLs  - Assess for home care needs following discharge   - Consider OT consult to assist with ADL evaluation and planning for discharge  - Provide patient education as appropriate  Outcome: Progressing  Goal: Maintains/Returns to pre admission functional level  Description: INTERVENTIONS:  - Perform AM-PAC 6 Click Basic Mobility/ Daily Activity assessment daily.  - Set and communicate daily mobility goal to care team and patient/family/caregiver.   - Collaborate with rehabilitation services on mobility goals if consulted  - Perform Range of Motion  times a day.  - Reposition patient every hours.  - Dangle patient  times a day  - Stand patient  times a day  - Ambulate patient  times a day  - Out of bed to chair  times a day   - Out of bed for meals  times a day  - Out of bed for toileting  - Record patient progress and  toleration of activity level   Outcome: Progressing     Problem: DISCHARGE PLANNING  Goal: Discharge to home or other facility with appropriate resources  Description: INTERVENTIONS:  - Identify barriers to discharge w/patient and caregiver  - Arrange for needed discharge resources and transportation as appropriate  - Identify discharge learning needs (meds, wound care, etc.)  - Arrange for interpretive services to assist at discharge as needed  - Refer to Case Management Department for coordinating discharge planning if the patient needs post-hospital services based on physician/advanced practitioner order or complex needs related to functional status, cognitive ability, or social support system  Outcome: Progressing     Problem: Knowledge Deficit  Goal: Patient/family/caregiver demonstrates understanding of disease process, treatment plan, medications, and discharge instructions  Description: Complete learning assessment and assess knowledge base.  Interventions:  - Provide teaching at level of understanding  - Provide teaching via preferred learning methods  Outcome: Progressing     Problem: MOBILITY - ADULT  Goal: Maintain or return to baseline ADL function  Description: INTERVENTIONS:  -  Assess patient's ability to carry out ADLs; assess patient's baseline for ADL function and identify physical deficits which impact ability to perform ADLs (bathing, care of mouth/teeth, toileting, grooming, dressing, etc.)  - Assess/evaluate cause of self-care deficits   - Assess range of motion  - Assess patient's mobility; develop plan if impaired  - Assess patient's need for assistive devices and provide as appropriate  - Encourage maximum independence but intervene and supervise when necessary  - Involve family in performance of ADLs  - Assess for home care needs following discharge   - Consider OT consult to assist with ADL evaluation and planning for discharge  - Provide patient education as appropriate  Outcome:  Progressing  Goal: Maintains/Returns to pre admission functional level  Description: INTERVENTIONS:  - Perform AM-PAC 6 Click Basic Mobility/ Daily Activity assessment daily.  - Set and communicate daily mobility goal to care team and patient/family/caregiver.   - Collaborate with rehabilitation services on mobility goals if consulted  - Perform Range of Motion  times a day.  - Reposition patient every hours.  - Dangle patient  times a day  - Stand patient  times a day  - Ambulate patient  times a day  - Out of bed to chair  times a day   - Out of bed for meals times a day  - Out of bed for toileting  - Record patient progress and toleration of activity level   Outcome: Progressing     Problem: Nutrition/Hydration-ADULT  Goal: Nutrient/Hydration intake appropriate for improving, restoring or maintaining nutritional needs  Description: Monitor and assess patient's nutrition/hydration status for malnutrition. Collaborate with interdisciplinary team and initiate plan and interventions as ordered.  Monitor patient's weight and dietary intake as ordered or per policy. Utilize nutrition screening tool and intervene as necessary. Determine patient's food preferences and provide high-protein, high-caloric foods as appropriate.     INTERVENTIONS:  - Monitor oral intake, urinary output, labs, and treatment plans  - Assess nutrition and hydration status and recommend course of action  - Evaluate amount of meals eaten  - Assist patient with eating if necessary   - Allow adequate time for meals  - Recommend/ encourage appropriate diets, oral nutritional supplements, and vitamin/mineral supplements  - Order, calculate, and assess calorie counts as needed  - Recommend, monitor, and adjust tube feedings and TPN/PPN based on assessed needs  - Assess need for intravenous fluids  - Provide specific nutrition/hydration education as appropriate  - Include patient/family/caregiver in decisions related to nutrition  Outcome:  Progressing

## 2024-03-03 NOTE — ASSESSMENT & PLAN NOTE
Lab Results   Component Value Date    HSTNI0 2,584 (H) 02/17/2024    HSTNI2 2,049 (H) 02/17/2024    HSTNID2 -535 02/17/2024     Presented with elevated troponins, SOB, chest pain  CT chest showing no evidence of pulmonary embolism, did note mild pulmonary edema  No chest pain present, but significant SOB, anginal equivalent, sudden onset, fairly elevated troponins  EKG: Sinus Tachycardia with no obvious STEMI  ADRIEN Score: 5    Initially Suspected a true type I NSTEMI but cath despite revealing CAD did not reveal lesion that could be intervened  Cardiac cath showed significant triple vessel disease with 90% stenosis in the mid LAD, 80% stenosis in the proximal LAD, 85% stenosis in the mid Cx, and 70% in the RCA with a patent mid RCA stent. Cardiology recommends maximizing medical therapy for her CAD and follow-up outpatient with a CT surgeon for a potential CABG once she fully recovers from COVID. As per cardiac cath report could also be Takotsubo cardiomyopathy  Patient has ischemic cardiomyopathy with reduced EF of 35%, Lifevest has been fitted -telemetry monitoring as per protocol

## 2024-03-03 NOTE — PLAN OF CARE
Problem: INFECTION - ADULT  Goal: Absence or prevention of progression during hospitalization  Description: INTERVENTIONS:  - Assess and monitor for signs and symptoms of infection  - Monitor lab/diagnostic results  - Monitor all insertion sites, i.e. indwelling lines, tubes, and drains  - Monitor endotracheal if appropriate and nasal secretions for changes in amount and color  - [place] appropriate cooling/warming therapies per order  - Administer medications as ordered  - Instruct and encourage patient and family to use good hand hygiene technique  - Identify and instruct in appropriate isolation precautions for identified infection/condition  Outcome: Progressing

## 2024-03-03 NOTE — PROGRESS NOTES
[facility]  Progress Note  Name: aLuren Quintero I  MRN: [id number]  Unit/Bed#: -01 I Date of Admission: 2/15/2024   Date of Service: 3/3/2024 I Hospital Day: 17    Assessment/Plan   * Acute on chronic respiratory failure with hypoxia (HCC)  Assessment & Plan  Currently SpO2: 91 % on Nasal Cannula O2 Flow Rate (L/min): 3 L/min    At baseline, uses 4L NC  CT Imaging showing mild pulm edema, scattered areas of tree-in-bud opacity  Continue to treat underlying NSTEMI, COVID, mild pulm edema  2/23 repeat chest xray Unchanged appearance of mild interstitial edema.Right basilar opacity likely representing small effusion with atelectasis. Please note pneumonia may demonstrate a similar appearance the appropriate clinical setting.  2/26 CXR was generally unchanged from 2/23 CXR. Continue treatment for pneumonia vs COPD exacerbation vs atelectasis. Pt continues to complain of pleuritic right lower chest pain.   Pt continues to complain of right upper abdominal/lower chest wall pain. On exam today found to have a palpable lower liver edge that was tender to palpation. Hepatic function tests and RUQ US were negative for any acute process.   IR was consulted for drainage of right lower lobe fluid accumulation, but decided the accumulation was too small to tap. Will get a repeat chest CT to evaluate effusion as well as broaden antibiotic coverage to IV Unasyn  Repeat Chest CT 2/28 was negative for PE. Showed consolidation vs. Atelectasis in right lower lobe with mucus plugging in bronchi. Pulmonology was consulted, recommend flutter valve and vest therapy as well as continue Unasyn IV, prednisone taper, daily nebs. Pt is not a good candidate for bronchoscopy b/c of her recent NSTEMI, triple vessel disease, HFreF. Superimoposed bacterial pneumonia vs. Aspiration pneumonia. Will order a swallow study to further evaluate. Procalcitonin still elevated this morning at 0.56, down from 0.92 yesterday. WBC  "down trending at 10.17 today from 15.34 yesterday.  2/29: Oxygen wean down to baseline 4L NC, pt says her right lower chest pain is much improved compared to yesterday, breathing better on exam.   2/30: Swallow study showed mild esophageal retention, can't rule out aspiration. Pt's O2 requirements at baseline, RLL pain is improving, WBC normalized to 8.82, continues to be afebrile.     Patient continues to complain of intermittent hemoptysis so we are monitoring her  Continue antibiotic therapy with Unasyn - plan for a 5 day course   Continue flutter valve  Continues incentive spirometry  Continue to monitoring oxygen just enough to keep saturation around 92%  Continue modified diet - currently dysphagia 3 dental soft thin liquids  Pulmonary input noted and appreciated  At this time waiting for improvement particularly in hemoptysis and hoping to discharge to rehab soon, patient appears overall tired and fatigued but this is somewhat a chronic issue.  I did ask her if she wants to continue with aggressive treatments moving forward, she is somewhat conflicted but at this time she told me she is not ready to \"give up\" yet.  Patient requires continued stay    Elevated troponin  Assessment & Plan  Lab Results   Component Value Date    HSTNI0 2,584 (H) 02/17/2024    HSTNI2 2,049 (H) 02/17/2024    HSTNID2 -535 02/17/2024     Presented with elevated troponins, SOB, chest pain  CT chest showing no evidence of pulmonary embolism, did note mild pulmonary edema  No chest pain present, but significant SOB, anginal equivalent, sudden onset, fairly elevated troponins  EKG: Sinus Tachycardia with no obvious STEMI  ADRIEN Score: 5    Initially Suspected a true type I NSTEMI but cath despite revealing CAD did not reveal lesion that could be intervened  Cardiac cath showed significant triple vessel disease with 90% stenosis in the mid LAD, 80% stenosis in the proximal LAD, 85% stenosis in the mid Cx, and 70% in the RCA with a patent " mid RCA stent. Cardiology recommends maximizing medical therapy for her CAD and follow-up outpatient with a CT surgeon for a potential CABG once she fully recovers from COVID. As per cardiac cath report could also be Takotsubo cardiomyopathy  Patient has ischemic cardiomyopathy with reduced EF of 35%, Lifevest has been fitted -telemetry monitoring as per protocol        COVID-19  Assessment & Plan  Lab Results   Component Value Date    SARSCOV2 Positive (A) 02/15/2024    SARSCOV2 Negative 12/12/2023    SARSCOV2 Negative 10/28/2023       Symptoms of COVID-19 SYMPTOMS: shortness of breath and Tachycardia (more than 100 beats per min)  Vaccine status: vaccinated  The patient does have acute hypoxemic respiratory failure given the same    CT chest w contrast showing Scattered areas of tree-in-bud opacity consistent with endobronchial impaction/infection  S/p remdesevir, IV steroids  Finished baricitinib  No need for further isolation  Continue to monitor    CKD (chronic kidney disease)  Assessment & Plan  Lab Results   Component Value Date    EGFR 73 03/03/2024    EGFR 72 03/02/2024    EGFR 61 03/01/2024    CREATININE 0.78 03/03/2024    CREATININE 0.79 03/02/2024    CREATININE 0.91 03/01/2024     Pt has a history of CKD stage 2  Transitioned to po lasix 20 mg daily,   Cont to monitor bmp    COPD with exacerbation (HCC)  Assessment & Plan  She did have Worsening Sob, wheezing, increased o2 requirements as above.   scheduled nebulizer treatment  Continue with po prednisone taper  Pulm following    Major depressive disorder, recurrent episode, moderate (HCC)  Assessment & Plan  Continue wellbutrin, vilazodone  Also takes Vraylar - patient brought this from home - continue    Essential hypertension  Assessment & Plan  Continue home meds; initiate lisinopril as part of NSTEMI management  Continue carvedilol 25mg PO BID  Continue furosemide 20mg qD which is a chronic medicine  Continue to monitor blood pressure                  VTE Pharmacologic Prophylaxis:   Pharmacologic: Heparin  Mechanical VTE Prophylaxis in Place: Yes    Patient Centered Rounds: I have performed bedside rounds with nursing staff today.    Discussions with Specialists or Other Care Team Provider: Discussed with care management team    Education and Discussions with Family / Patient:   I have asked the patient and she DOES NOT want me to talk to her son today    Time Spent for Care: 45 minutes.  More than 50% of total time spent on counseling and coordination of care as described above.    Current Length of Stay: 17 day(s)    Current Patient Status: Inpatient   Certification Statement: The patient will continue to require additional inpatient hospital stay due to need for IV antibiotics, hemoptysis    Discharge Plan: 48-72h    Code Status: Level 3 - DNAR and DNI      Subjective:     Patient evaluated this morning.  Overall still complains of a productive cough with some intermittent hemoptysis.  Denies any nausea or vomiting.  Does mention dry mouth    Objective:     Vitals:   Temp (24hrs), Av.1 °F (36.7 °C), Min:97.6 °F (36.4 °C), Max:98.8 °F (37.1 °C)    Temp:  [97.6 °F (36.4 °C)-98.8 °F (37.1 °C)] 98.4 °F (36.9 °C)  HR:  [] 102  Resp:  [18] 18  BP: (134-150)/(72-82) 150/82  SpO2:  [91 %-96 %] 91 %  Body mass index is 20.07 kg/m².     Input and Output Summary (last 24 hours):       Intake/Output Summary (Last 24 hours) at 3/3/2024 1042  Last data filed at 3/3/2024 0600  Gross per 24 hour   Intake 100 ml   Output 450 ml   Net -350 ml       Physical Exam:     Physical Exam  Vitals and nursing note reviewed.   Constitutional:       Appearance: Normal appearance. She is normal weight. She is ill-appearing.      Comments: Chronically ill-appearing female in bed, awake   HENT:      Head: Normocephalic and atraumatic.      Right Ear: External ear normal.      Left Ear: External ear normal.      Nose: Nose normal. No congestion.      Mouth/Throat:      Mouth:  Mucous membranes are moist.      Pharynx: No oropharyngeal exudate or posterior oropharyngeal erythema.      Comments: Thrush noted  Eyes:      General: No scleral icterus.        Right eye: No discharge.         Left eye: No discharge.      Extraocular Movements: Extraocular movements intact.      Conjunctiva/sclera: Conjunctivae normal.      Pupils: Pupils are equal, round, and reactive to light.   Cardiovascular:      Rate and Rhythm: Normal rate and regular rhythm.      Pulses: Normal pulses.      Heart sounds: Normal heart sounds. No murmur heard.     No friction rub. No gallop.   Pulmonary:      Effort: Pulmonary effort is normal. No respiratory distress.      Breath sounds: No stridor. Rhonchi present. No wheezing or rales.      Comments: Rhonchi noted mostly in the right lower lung fields  Chest:      Chest wall: No tenderness.   Abdominal:      General: Abdomen is flat. Bowel sounds are normal. There is no distension.      Palpations: Abdomen is soft. There is no mass.      Tenderness: There is no abdominal tenderness. There is no guarding or rebound.   Musculoskeletal:         General: No swelling, tenderness, deformity or signs of injury. Normal range of motion.      Cervical back: Normal range of motion and neck supple. No rigidity. No muscular tenderness.   Skin:     General: Skin is warm and dry.      Capillary Refill: Capillary refill takes less than 2 seconds.      Coloration: Skin is not jaundiced or pale.      Findings: No bruising, erythema, lesion or rash.   Neurological:      General: No focal deficit present.      Mental Status: She is alert and oriented to person, place, and time. Mental status is at baseline.      Cranial Nerves: No cranial nerve deficit.      Sensory: No sensory deficit.      Motor: No weakness.      Coordination: Coordination normal.   Psychiatric:         Mood and Affect: Mood normal.         Behavior: Behavior normal.         Thought Content: Thought content normal.          Judgment: Judgment normal.         Additional Data:     Labs:    Results from last 7 days   Lab Units 03/03/24  0600 03/02/24  0541 03/01/24  0447   WBC Thousand/uL 11.51* 9.57 8.82   HEMOGLOBIN g/dL 9.9* 9.7* 10.1*   HEMATOCRIT % 31.2* 31.2* 33.1*   PLATELETS Thousands/uL 215 213 212   BANDS PCT %  --  4  --    NEUTROS PCT %  --   --  78*   LYMPHS PCT %  --   --  12*   LYMPHO PCT %  --  10*  --    MONOS PCT %  --   --  8   MONO PCT %  --  4  --    EOS PCT %  --  0 0     Results from last 7 days   Lab Units 03/03/24  0600 03/01/24  0447 02/29/24  0624   SODIUM mmol/L 142   < > 142   POTASSIUM mmol/L 3.8   < > 4.4   CHLORIDE mmol/L 105   < > 105   CO2 mmol/L 32   < > 30   BUN mg/dL 23   < > 32*   CREATININE mg/dL 0.78   < > 0.88   ANION GAP mmol/L 5   < > 7   CALCIUM mg/dL 8.7   < > 9.0   ALBUMIN g/dL  --   --  3.1*   TOTAL BILIRUBIN mg/dL  --   --  0.36   ALK PHOS U/L  --   --  69   ALT U/L  --   --  6*   AST U/L  --   --  12*   GLUCOSE RANDOM mg/dL 95   < > 105    < > = values in this interval not displayed.                 Results from last 7 days   Lab Units 02/29/24  0624 02/28/24  0517 02/26/24  0438   PROCALCITONIN ng/ml 0.56* 0.92* 0.24           * I Have Reviewed All Lab Data Listed Above.  * Additional Pertinent Lab Tests Reviewed: All Labs For Current Hospital Admission Reviewed    I    Recent Cultures (last 7 days):     Results from last 7 days   Lab Units 02/28/24  1342   LEGIONELLA URINARY ANTIGEN  Negative       Last 24 Hours Medication List:   Current Facility-Administered Medications   Medication Dose Route Frequency Provider Last Rate    acetaminophen  975 mg Oral Q8H PRN Vasu Ramos PA-C      albuterol  2 puff Inhalation Q4H PRN Vasu Ramos PA-C      albuterol  2.5 mg Nebulization Q4H PRN Vasu Ramos PA-C      aluminum-magnesium hydroxide-simethicone  30 mL Oral Q6H PRN Vasu Ramos PA-C      ampicillin-sulbactam  3 g Intravenous Q6H Kenny abraham  MD Tia 3 g (03/03/24 0820)    aspirin  81 mg Oral Daily Vasu Ramos PA-C      budesonide-formoterol  2 puff Inhalation BID Vasu Ramos PA-C      buPROPion  75 mg Oral Daily Vasu Ramos PA-C      carvedilol  25 mg Oral BID With Meals Vasu Ramos PA-C      dextromethorphan-guaiFENesin  10 mL Oral Q4H PRN Kenny Alan MD      docusate sodium  100 mg Oral BID Vasu Ramos PA-C      furosemide  20 mg Oral Daily Alcides Rutherford MD      heparin (porcine)  5,000 Units Subcutaneous Q8H ROSELINE Alcides Rutherford MD      ipratropium  0.5 mg Nebulization TID Herve Jerez PA-C      labetalol  10 mg Intravenous Q6H PRN Vasu Ramos PA-C      levalbuterol  1.25 mg Nebulization TID Herve Jerez PA-C      lidocaine  2 patch Topical Daily PRN Vasu Ramos PA-C      loperamide  2 mg Oral 4x Daily PRN Kenny Alan MD      LORazepam  0.5 mg Oral BID PRN Latia Cortes DO      melatonin  3 mg Oral HS PRN Vasu Ramos PA-C      midodrine  2.5 mg Oral TID AC Vasu Ramos PA-C      morphine injection  2 mg Intravenous Q6H PRN Kenny Alan MD      nitroglycerin  0.4 mg Sublingual Q5 Min PRN Vasu Ramos PA-C      nystatin  500,000 Units Swish & Swallow 4x Daily Kenny Alan MD      ondansetron  4 mg Intravenous Q6H PRN Vasu Ramos PA-C      pantoprazole  40 mg Oral Daily Vasu Ramos PA-C      pravastatin  80 mg Oral QPM Vasu Ramos PA-C      predniSONE  30 mg Oral Daily Herve Jerez PA-C      sodium chloride  4 mL Nebulization TID Herve Jerez PA-C      vilazodone  40 mg Oral Daily With Breakfast ANGÉLICA Oswaldubrutinib  160 mg Oral BID Vasu Ramos PA-C          Today, Patient Was Seen By: Kenny Alan MD    ** Please Note: Dictation voice to text software may have been used in the creation of this  document. **

## 2024-03-03 NOTE — ASSESSMENT & PLAN NOTE
Currently SpO2: 91 % on Nasal Cannula O2 Flow Rate (L/min): 3 L/min    At baseline, uses 4L NC  CT Imaging showing mild pulm edema, scattered areas of tree-in-bud opacity  Continue to treat underlying NSTEMI, COVID, mild pulm edema  2/23 repeat chest xray Unchanged appearance of mild interstitial edema.Right basilar opacity likely representing small effusion with atelectasis. Please note pneumonia may demonstrate a similar appearance the appropriate clinical setting.  2/26 CXR was generally unchanged from 2/23 CXR. Continue treatment for pneumonia vs COPD exacerbation vs atelectasis. Pt continues to complain of pleuritic right lower chest pain.   Pt continues to complain of right upper abdominal/lower chest wall pain. On exam today found to have a palpable lower liver edge that was tender to palpation. Hepatic function tests and RUQ US were negative for any acute process.   IR was consulted for drainage of right lower lobe fluid accumulation, but decided the accumulation was too small to tap. Will get a repeat chest CT to evaluate effusion as well as broaden antibiotic coverage to IV Unasyn  Repeat Chest CT 2/28 was negative for PE. Showed consolidation vs. Atelectasis in right lower lobe with mucus plugging in bronchi. Pulmonology was consulted, recommend flutter valve and vest therapy as well as continue Unasyn IV, prednisone taper, daily nebs. Pt is not a good candidate for bronchoscopy b/c of her recent NSTEMI, triple vessel disease, HFreF. Superimoposed bacterial pneumonia vs. Aspiration pneumonia. Will order a swallow study to further evaluate. Procalcitonin still elevated this morning at 0.56, down from 0.92 yesterday. WBC down trending at 10.17 today from 15.34 yesterday.  2/29: Oxygen wean down to baseline 4L NC, pt says her right lower chest pain is much improved compared to yesterday, breathing better on exam.   2/30: Swallow study showed mild esophageal retention, can't rule out aspiration. Pt's O2  "requirements at baseline, RLL pain is improving, WBC normalized to 8.82, continues to be afebrile.     Patient continues to complain of intermittent hemoptysis so we are monitoring her  Continue antibiotic therapy with Unasyn - plan for a 5 day course   Continue flutter valve  Continues incentive spirometry  Continue to monitoring oxygen just enough to keep saturation around 92%  Continue modified diet - currently dysphagia 3 dental soft thin liquids  Pulmonary input noted and appreciated  At this time waiting for improvement particularly in hemoptysis and hoping to discharge to rehab soon, patient appears overall tired and fatigued but this is somewhat a chronic issue.  I did ask her if she wants to continue with aggressive treatments moving forward, she is somewhat conflicted but at this time she told me she is not ready to \"give up\" yet.  Patient requires continued stay  "

## 2024-03-04 ENCOUNTER — APPOINTMENT (INPATIENT)
Dept: RADIOLOGY | Facility: HOSPITAL | Age: 79
DRG: 871 | End: 2024-03-04
Payer: MEDICARE

## 2024-03-04 LAB
ANION GAP SERPL CALCULATED.3IONS-SCNC: 5 MMOL/L
BUN SERPL-MCNC: 21 MG/DL (ref 5–25)
CALCIUM SERPL-MCNC: 8.3 MG/DL (ref 8.4–10.2)
CHLORIDE SERPL-SCNC: 104 MMOL/L (ref 96–108)
CO2 SERPL-SCNC: 34 MMOL/L (ref 21–32)
CREAT SERPL-MCNC: 0.76 MG/DL (ref 0.6–1.3)
ERYTHROCYTE [DISTWIDTH] IN BLOOD BY AUTOMATED COUNT: 13.1 % (ref 11.6–15.1)
GFR SERPL CREATININE-BSD FRML MDRD: 75 ML/MIN/1.73SQ M
GLUCOSE SERPL-MCNC: 97 MG/DL (ref 65–140)
HCT VFR BLD AUTO: 29.8 % (ref 34.8–46.1)
HGB BLD-MCNC: 9.5 G/DL (ref 11.5–15.4)
MCH RBC QN AUTO: 30.3 PG (ref 26.8–34.3)
MCHC RBC AUTO-ENTMCNC: 31.9 G/DL (ref 31.4–37.4)
MCV RBC AUTO: 95 FL (ref 82–98)
PLATELET # BLD AUTO: 237 THOUSANDS/UL (ref 149–390)
PMV BLD AUTO: 10.6 FL (ref 8.9–12.7)
POTASSIUM SERPL-SCNC: 3.4 MMOL/L (ref 3.5–5.3)
RBC # BLD AUTO: 3.14 MILLION/UL (ref 3.81–5.12)
SODIUM SERPL-SCNC: 143 MMOL/L (ref 135–147)
WBC # BLD AUTO: 10.75 THOUSAND/UL (ref 4.31–10.16)

## 2024-03-04 PROCEDURE — 99232 SBSQ HOSP IP/OBS MODERATE 35: CPT | Performed by: INTERNAL MEDICINE

## 2024-03-04 PROCEDURE — 80048 BASIC METABOLIC PNL TOTAL CA: CPT | Performed by: INTERNAL MEDICINE

## 2024-03-04 PROCEDURE — 85027 COMPLETE CBC AUTOMATED: CPT | Performed by: INTERNAL MEDICINE

## 2024-03-04 PROCEDURE — 94640 AIRWAY INHALATION TREATMENT: CPT

## 2024-03-04 PROCEDURE — 99233 SBSQ HOSP IP/OBS HIGH 50: CPT | Performed by: INTERNAL MEDICINE

## 2024-03-04 PROCEDURE — 94760 N-INVAS EAR/PLS OXIMETRY 1: CPT

## 2024-03-04 PROCEDURE — 71045 X-RAY EXAM CHEST 1 VIEW: CPT

## 2024-03-04 RX ADMIN — MIDODRINE HYDROCHLORIDE 2.5 MG: 5 TABLET ORAL at 17:31

## 2024-03-04 RX ADMIN — CARVEDILOL 25 MG: 12.5 TABLET, FILM COATED ORAL at 08:00

## 2024-03-04 RX ADMIN — SODIUM CHLORIDE SOLN NEBU 3% 4 ML: 3 NEBU SOLN at 13:38

## 2024-03-04 RX ADMIN — CARVEDILOL 25 MG: 12.5 TABLET, FILM COATED ORAL at 17:32

## 2024-03-04 RX ADMIN — PRAVASTATIN SODIUM 80 MG: 80 TABLET ORAL at 17:31

## 2024-03-04 RX ADMIN — MORPHINE SULFATE 2 MG: 2 INJECTION, SOLUTION INTRAMUSCULAR; INTRAVENOUS at 03:59

## 2024-03-04 RX ADMIN — ACETAMINOPHEN 975 MG: 325 TABLET, FILM COATED ORAL at 00:23

## 2024-03-04 RX ADMIN — IPRATROPIUM BROMIDE 0.5 MG: 0.5 SOLUTION RESPIRATORY (INHALATION) at 19:31

## 2024-03-04 RX ADMIN — AMPICILLIN SODIUM AND SULBACTAM SODIUM 3 G: 100; 50 INJECTION, POWDER, FOR SOLUTION INTRAVENOUS at 15:05

## 2024-03-04 RX ADMIN — IPRATROPIUM BROMIDE 0.5 MG: 0.5 SOLUTION RESPIRATORY (INHALATION) at 07:26

## 2024-03-04 RX ADMIN — ZANUBRUTINIB 160 MG: 80 CAPSULE, GELATIN COATED ORAL at 09:53

## 2024-03-04 RX ADMIN — MIDODRINE HYDROCHLORIDE 2.5 MG: 5 TABLET ORAL at 12:17

## 2024-03-04 RX ADMIN — LEVALBUTEROL HYDROCHLORIDE 1.25 MG: 1.25 SOLUTION RESPIRATORY (INHALATION) at 13:38

## 2024-03-04 RX ADMIN — IPRATROPIUM BROMIDE 0.5 MG: 0.5 SOLUTION RESPIRATORY (INHALATION) at 13:38

## 2024-03-04 RX ADMIN — MORPHINE SULFATE 2 MG: 2 INJECTION, SOLUTION INTRAMUSCULAR; INTRAVENOUS at 21:27

## 2024-03-04 RX ADMIN — GUAIFENESIN AND DEXTROMETHORPHAN 10 ML: 100; 10 SYRUP ORAL at 12:16

## 2024-03-04 RX ADMIN — HEPARIN SODIUM 5000 UNITS: 5000 INJECTION INTRAVENOUS; SUBCUTANEOUS at 05:02

## 2024-03-04 RX ADMIN — BUPROPION HYDROCHLORIDE 75 MG: 75 TABLET, FILM COATED ORAL at 09:56

## 2024-03-04 RX ADMIN — AMPICILLIN SODIUM AND SULBACTAM SODIUM 3 G: 100; 50 INJECTION, POWDER, FOR SOLUTION INTRAVENOUS at 21:29

## 2024-03-04 RX ADMIN — LEVALBUTEROL HYDROCHLORIDE 1.25 MG: 1.25 SOLUTION RESPIRATORY (INHALATION) at 19:31

## 2024-03-04 RX ADMIN — ASPIRIN 81 MG: 81 TABLET, COATED ORAL at 09:56

## 2024-03-04 RX ADMIN — LIDOCAINE 5% 2 PATCH: 700 PATCH TOPICAL at 12:18

## 2024-03-04 RX ADMIN — HEPARIN SODIUM 5000 UNITS: 5000 INJECTION INTRAVENOUS; SUBCUTANEOUS at 15:06

## 2024-03-04 RX ADMIN — LORAZEPAM 0.5 MG: 0.5 TABLET ORAL at 00:24

## 2024-03-04 RX ADMIN — DOCUSATE SODIUM 100 MG: 100 CAPSULE, LIQUID FILLED ORAL at 09:57

## 2024-03-04 RX ADMIN — SODIUM CHLORIDE SOLN NEBU 3% 4 ML: 3 NEBU SOLN at 19:31

## 2024-03-04 RX ADMIN — SODIUM CHLORIDE SOLN NEBU 3% 4 ML: 3 NEBU SOLN at 07:26

## 2024-03-04 RX ADMIN — DOCUSATE SODIUM 100 MG: 100 CAPSULE, LIQUID FILLED ORAL at 17:32

## 2024-03-04 RX ADMIN — Medication 3 MG: at 21:27

## 2024-03-04 RX ADMIN — PREDNISONE 30 MG: 20 TABLET ORAL at 09:56

## 2024-03-04 RX ADMIN — LEVALBUTEROL HYDROCHLORIDE 1.25 MG: 1.25 SOLUTION RESPIRATORY (INHALATION) at 07:26

## 2024-03-04 RX ADMIN — VILAZODONE HYDROCHLORIDE 40 MG: 20 TABLET ORAL at 08:03

## 2024-03-04 RX ADMIN — FUROSEMIDE 20 MG: 20 TABLET ORAL at 09:56

## 2024-03-04 RX ADMIN — AMPICILLIN SODIUM AND SULBACTAM SODIUM 3 G: 100; 50 INJECTION, POWDER, FOR SOLUTION INTRAVENOUS at 02:22

## 2024-03-04 RX ADMIN — PANTOPRAZOLE SODIUM 40 MG: 40 TABLET, DELAYED RELEASE ORAL at 09:56

## 2024-03-04 RX ADMIN — ZANUBRUTINIB 160 MG: 80 CAPSULE, GELATIN COATED ORAL at 17:32

## 2024-03-04 RX ADMIN — MIDODRINE HYDROCHLORIDE 2.5 MG: 5 TABLET ORAL at 08:00

## 2024-03-04 RX ADMIN — AMPICILLIN SODIUM AND SULBACTAM SODIUM 3 G: 100; 50 INJECTION, POWDER, FOR SOLUTION INTRAVENOUS at 08:43

## 2024-03-04 RX ADMIN — MORPHINE SULFATE 2 MG: 2 INJECTION, SOLUTION INTRAMUSCULAR; INTRAVENOUS at 12:21

## 2024-03-04 RX ADMIN — HEPARIN SODIUM 5000 UNITS: 5000 INJECTION INTRAVENOUS; SUBCUTANEOUS at 21:27

## 2024-03-04 NOTE — RESPIRATORY THERAPY NOTE
RT Protocol Note  Lauren Quintero 78 y.o. female MRN: [id number]  Unit/Bed#: -01 Encounter: [id number]    Assessment    Principal Problem:    Acute on chronic respiratory failure with hypoxia (HCC)  Active Problems:    Essential hypertension    Major depressive disorder, recurrent episode, moderate (HCC)    COPD with exacerbation (HCC)    COVID-19    CKD (chronic kidney disease)    Elevated troponin      Home Pulmonary Medications:  Symbicort   Albuterol MDI PRN  Atrovent TID  Home Devices/Therapy: Home O2    Past Medical History:   Diagnosis Date    Acute congestive heart failure (HCC) 2021    Anxiety     Cardiac disease     Chest pain 2021    Chronic pain disorder     COPD (chronic obstructive pulmonary disease) (HCC)     Depression     Heart disease     Hyperlipidemia     Hypertension     MI (myocardial infarction) ([facility])     MRSA (methicillin resistant Staphylococcus aureus)     Renal disorder     benign kidney tumor     Social History     Socioeconomic History    Marital status:      Spouse name: None    Number of children: 3    Years of education: 13    Highest education level: High school graduate   Occupational History    Occupation:      Employer: [employer]     Comment: retired    Tobacco Use    Smoking status: Former     Current packs/day: 0.00     Average packs/day: 1 pack/day for 60.0 years (60.0 ttl pk-yrs)     Types: Cigarettes     Start date:      Quit date: 2016     Years since quittin.1    Smokeless tobacco: Never    Tobacco comments:     She has close to a 60 pack-year smoking history, most recently has cut down to 4-5 cigarettes daily   Vaping Use    Vaping status: Never Used   Substance and Sexual Activity    Alcohol use: Never    Drug use: No    Sexual activity: Not Currently   Other Topics Concern    None   Social History Narrative    None     Social Determinants of Health     Financial Resource Strain: Patient Declined (2024)     Received from [facility]    Overall Financial Resource Strain (CARDIA)     Difficulty of Paying Living Expenses: Patient declined   Food Insecurity: No Food Insecurity (2/19/2024)    Hunger Vital Sign     Worried About Running Out of Food in the Last Year: Never true     Ran Out of Food in the Last Year: Never true   Transportation Needs: No Transportation Needs (2/19/2024)    PRAPARE - Transportation     Lack of Transportation (Medical): No     Lack of Transportation (Non-Medical): No   Physical Activity: Patient Declined (1/6/2024)    Received from [facility]    Exercise Vital Sign     Days of Exercise per Week: Patient declined     Minutes of Exercise per Session: Patient declined   Stress: Patient Declined (1/6/2024)    Received from [facility]    [employer] [place] [employer] - Occupational Stress Questionnaire     Feeling of Stress : Patient declined   Social Connections: Patient Declined (1/6/2024)    Received from [facility]    Social Connection and Isolation Panel [NHANES]     Frequency of Communication with Friends and Family: Patient declined     Frequency of Social Gatherings with Friends and Family: Patient declined     Attends Yazidi Services: Patient declined     Active Member of Clubs or Organizations: Patient declined     Attends Club or Organization Meetings: Patient declined     Marital Status: Patient declined   Intimate Partner Violence: Patient Declined (1/6/2024)    Received from [facility]    Humiliation, Afraid, Rape, and Kick questionnaire     Fear of Current or Ex-Partner: Patient declined     Emotionally Abused: Patient declined     Physically Abused: Patient declined     Sexually Abused: Patient declined   Housing Stability: Low Risk  (2/19/2024)    Housing Stability Vital Sign     Unable to Pay for Housing in the Last Year: No     Number of Places Lived in the Last Year: 2     Unstable Housing in the Last Year: No  "      Subjective    Subjective Data: awake and alert    Objective    Physical Exam:   Assessment Type: Pre-treatment  General Appearance: Alert, Awake  Respiratory Pattern: Normal  Chest Assessment: Chest expansion symmetrical  Bilateral Breath Sounds: Diminished, Scattered, Rhonchi  Cough: None  O2 Device: NC    Vitals:  Blood pressure 136/72, pulse 93, temperature 98.4 °F (36.9 °C), resp. rate 18, height 5' 8\" (1.727 m), weight 59.9 kg (132 lb), SpO2 92%, not currently breastfeeding.          Imaging and other studies:     O2 Device: NC     Plan    Respiratory Plan: Home Bronchodilator Patient pathway        Resp Comments: patient in no apparent resp distress, continue with medication as ordered patient takes txs at home.   "

## 2024-03-04 NOTE — CASE MANAGEMENT
Case Management Progress Note    Patient name Lauren Quintero  Location /-01 MRN [id number]  : 1945 Date 3/4/2024       LOS (days): 18  Geometric Mean LOS (GMLOS) (days): 5.1  Days to GMLOS:-12.8        OBJECTIVE:        Current admission status: Inpatient  Preferred Pharmacy:   [facility]/pharmacy #[id number] - [place] - [address]  [address]  [place] [address]  Phone: [phone] Fax: [phone]    [facility]  [place] - [address]  [address]  [address]  [place] [address]  Phone: [phone] Fax: [phone]    Primary Care Provider: Starla Bateman MD    Primary Insurance: MEDICARE  Secondary Insurance:     PROGRESS NOTE:  Patient reviewed during Interdisciplinary Rounds with SLIM today.  Anticipated d/c is TBD.  CM continues to follow for d/c planning.  Open referral for [facility] as patient does not want to return to [facility].  CM to review with patient for choice.

## 2024-03-04 NOTE — ASSESSMENT & PLAN NOTE
Currently SpO2: 97 % on Nasal Cannula O2 Flow Rate (L/min): 3 L/min    At baseline, uses 4L NC  CT Imaging showing mild pulm edema, scattered areas of tree-in-bud opacity  2/26 CXR was generally unchanged from 2/23 CXR. Continue treatment for pneumonia vs COPD exacerbation vs atelectasis. Pt continues to complain of pleuritic right lower chest pain.   IR was consulted for drainage of right lower lobe fluid accumulation, but decided the accumulation was too small to tap. Will get a repeat chest CT to evaluate effusion as well as broaden antibiotic coverage to IV Unasyn  Repeat Chest CT 2/28 was negative for PE. Showed consolidation vs. Atelectasis in right lower lobe with mucus plugging in bronchi. Pulmonology was consulted, recommend flutter valve and vest therapy as well as continue Unasyn IV, prednisone taper, daily nebs. Pt is not a good candidate for bronchoscopy b/c of her recent NSTEMI, triple vessel disease, HFreF. Superimoposed bacterial pneumonia vs. Aspiration pneumonia. Will order a swallow study to further evaluate. Procalcitonin still elevated this morning at 0.56, down from 0.92 yesterday. WBC down trending at 10.17 today from 15.34 yesterday.  2/29: Oxygen wean down to baseline 4L NC, pt says her right lower chest pain is much improved compared to yesterday, breathing better on exam.   3/1 :Swallow study showed mild esophageal retention, can't rule out aspiration. Pt's O2 requirements at baseline, RLL pain is improving, WBC normalized to 8.82, continues to be afebrile.     Patient continues to complain of intermittent hemoptysis so we are monitoring her  3/4: Pt is tachycardic, requiring increasing O2 requirements (89% on 5L), and continuing to complain of right sided lower chest pleuritic pain, productive cough, hemoptysis, and subjective chills. WBC has increased to 10.75 over the past 2 days. Will get a repeat CXR, procal, pulm following, appreciate recs.     Continue antibiotic therapy with Unasyn  "- plan for a 7 day course as per pulm  Continue flutter valve  Continues incentive spirometry  Continue to monitoring oxygen just enough to keep saturation around 92%  Continue modified diet - currently dysphagia 3 dental soft thin liquids  Pulmonary input noted and appreciated  At this time waiting for improvement particularly in hemoptysis and hoping to discharge to rehab soon, patient appears overall tired and fatigued but this is somewhat a chronic issue.  I did ask her if she wants to continue with aggressive treatments moving forward, she is somewhat conflicted but at this time she told me she is not ready to \"give up\" yet.  Patient requires continued stay  "

## 2024-03-04 NOTE — PROGRESS NOTES
Critical access hospital  Progress Note  Name: Lauren Quintero I  MRN: [id number]  Unit/Bed#: [facility] I Date of Admission: 2/15/2024   Date of Service: 3/4/2024 I Hospital Day: 18    Assessment/Plan   * Acute on chronic respiratory failure with hypoxia (HCC)  Assessment & Plan  Currently SpO2: 97 % on Nasal Cannula O2 Flow Rate (L/min): 3 L/min    At baseline, uses 4L NC  CT Imaging showing mild pulm edema, scattered areas of tree-in-bud opacity  2/26 CXR was generally unchanged from 2/23 CXR. Continue treatment for pneumonia vs COPD exacerbation vs atelectasis. Pt continues to complain of pleuritic right lower chest pain.   IR was consulted for drainage of right lower lobe fluid accumulation, but decided the accumulation was too small to tap. Will get a repeat chest CT to evaluate effusion as well as broaden antibiotic coverage to IV Unasyn  Repeat Chest CT 2/28 was negative for PE. Showed consolidation vs. Atelectasis in right lower lobe with mucus plugging in bronchi. Pulmonology was consulted, recommend flutter valve and vest therapy as well as continue Unasyn IV, prednisone taper, daily nebs. Pt is not a good candidate for bronchoscopy b/c of her recent NSTEMI, triple vessel disease, HFreF. Superimoposed bacterial pneumonia vs. Aspiration pneumonia. Will order a swallow study to further evaluate. Procalcitonin still elevated this morning at 0.56, down from 0.92 yesterday. WBC down trending at 10.17 today from 15.34 yesterday.  2/29: Oxygen wean down to baseline 4L NC, pt says her right lower chest pain is much improved compared to yesterday, breathing better on exam.   3/1 :Swallow study showed mild esophageal retention, can't rule out aspiration. Pt's O2 requirements at baseline, RLL pain is improving, WBC normalized to 8.82, continues to be afebrile.     Patient continues to complain of intermittent hemoptysis so we are monitoring her  3/4: Pt is tachycardic, requiring increasing O2 requirements  "(89% on 5L), and continuing to complain of right sided lower chest pleuritic pain, productive cough, hemoptysis, and subjective chills. WBC has increased to 10.75 over the past 2 days. Will get a repeat CXR, procal, pulm following, appreciate recs.     Continue antibiotic therapy with Unasyn - plan for a 7 day course as per pulm  Continue flutter valve  Continues incentive spirometry  Continue to monitoring oxygen just enough to keep saturation around 92%  Continue modified diet - currently dysphagia 3 dental soft thin liquids  Pulmonary input noted and appreciated  At this time waiting for improvement particularly in hemoptysis and hoping to discharge to rehab soon, patient appears overall tired and fatigued but this is somewhat a chronic issue.  I did ask her if she wants to continue with aggressive treatments moving forward, she is somewhat conflicted but at this time she told me she is not ready to \"give up\" yet.  Patient requires continued stay    Elevated troponin  Assessment & Plan  Lab Results   Component Value Date    HSTNI0 2,584 (H) 02/17/2024    HSTNI2 2,049 (H) 02/17/2024    HSTNID2 -535 02/17/2024     Presented with elevated troponins, SOB, chest pain  CT chest showing no evidence of pulmonary embolism, did note mild pulmonary edema  No chest pain present, but significant SOB, anginal equivalent, sudden onset, fairly elevated troponins  EKG: Sinus Tachycardia with no obvious STEMI  ADRIEN Score: 5    Initially Suspected a true type I NSTEMI but cath despite revealing CAD did not reveal lesion that could be intervened  Cardiac cath showed significant triple vessel disease with 90% stenosis in the mid LAD, 80% stenosis in the proximal LAD, 85% stenosis in the mid Cx, and 70% in the RCA with a patent mid RCA stent. Cardiology recommends maximizing medical therapy for her CAD and follow-up outpatient with a CT surgeon for a potential CABG once she fully recovers from COVID. As per cardiac cath report could " also be Takotsubo cardiomyopathy  Patient has ischemic cardiomyopathy with reduced EF of 35%, Lifevest has been fitted -telemetry monitoring as per protocol        COVID-19  Assessment & Plan  Lab Results   Component Value Date    SARSCOV2 Positive (A) 02/15/2024    SARSCOV2 Negative 12/12/2023    SARSCOV2 Negative 10/28/2023       Symptoms of COVID-19 SYMPTOMS: shortness of breath and Tachycardia (more than 100 beats per min)  Vaccine status: vaccinated  The patient does have acute hypoxemic respiratory failure given the same    CT chest w contrast showing Scattered areas of tree-in-bud opacity consistent with endobronchial impaction/infection  S/p remdesevir, IV steroids  Finished baricitinib  No need for further isolation  Continue to monitor    CKD (chronic kidney disease)  Assessment & Plan  Lab Results   Component Value Date    EGFR 75 03/04/2024    EGFR 73 03/03/2024    EGFR 72 03/02/2024    CREATININE 0.76 03/04/2024    CREATININE 0.78 03/03/2024    CREATININE 0.79 03/02/2024     Pt has a history of CKD stage 2  Transitioned to po lasix 20 mg daily,   Cont to monitor bmp    COPD with exacerbation (HCC)  Assessment & Plan  She did have Worsening Sob, wheezing, increased o2 requirements as above.   scheduled nebulizer treatment  Continue with po prednisone taper  Pulm following    Essential hypertension  Assessment & Plan  Continue home meds; initiate lisinopril as part of NSTEMI management  Continue carvedilol 25mg PO BID  Continue furosemide 20mg qD which is a chronic medicine  Continue to monitor blood pressure                     VTE Pharmacologic Prophylaxis: VTE Score: 7 High Risk (Score >/= 5) - Pharmacological DVT Prophylaxis Ordered: heparin. Sequential Compression Devices Ordered.    Mobility:   Basic Mobility Inpatient Raw Score: 14  -HLM Goal: 4: Move to chair/commode  -HLM Achieved: 1: Laying in bed  HLM Goal NOT achieved. Continue with multidisciplinary rounding and encourage appropriate  mobility to improve upon HLM goals.    Patient Centered Rounds: I performed bedside rounds with nursing staff today.  Discussions with Specialists or Other Care Team Provider:  IP CONSULT TO CARDIOLOGY  IP CONSULT TO PULMONOLOGY      Education and Discussions with Family / Patient:   Patient herself, she DID NOT ask me to talk to her son I asked    Current Length of Stay: 18 day(s)  Current Patient Status: Inpatient   Certification Statement: The patient will continue to require additional inpatient hospital stay due to plan as noted above  Discharge Plan: Anticipate discharge in 24-48 hrs to rehab facility.    Code Status: Level 3 - DNAR and DNI    Subjective:   Pt is resting in bed. Says she feels worse, SOB, pleuritic chest pain, productive cough, sore throat, and chills. She is frustrated she isn't getting better and continues to feel sick. Last time she coughed up blood was yesterday. Needing increasing O2 requirements this morning, which slightly improved with nebulizer treatment. All questions answered.     Objective:     Vitals:   Temp (24hrs), Av °F (36.7 °C), Min:97.4 °F (36.3 °C), Max:98.9 °F (37.2 °C)    Temp:  [97.4 °F (36.3 °C)-98.9 °F (37.2 °C)] 98.9 °F (37.2 °C)  HR:  [] 109  Resp:  [18-19] 18  BP: (130-166)/(66-94) 151/79  SpO2:  [90 %-99 %] 94 %  Body mass index is 20.07 kg/m².     Input and Output Summary (last 24 hours):     Intake/Output Summary (Last 24 hours) at 3/4/2024 1555  Last data filed at 3/4/2024 1329  Gross per 24 hour   Intake 440 ml   Output 1500 ml   Net -1060 ml       Physical Exam:   Physical Exam  Constitutional:       Appearance: She is ill-appearing.   HENT:      Head: Normocephalic and atraumatic.      Right Ear: External ear normal.      Left Ear: External ear normal.      Nose: Nose normal.      Mouth/Throat:      Mouth: Mucous membranes are moist.      Pharynx: Oropharynx is clear.   Eyes:      Extraocular Movements: Extraocular movements intact.       Conjunctiva/sclera: Conjunctivae normal.   Cardiovascular:      Rate and Rhythm: Normal rate and regular rhythm.      Pulses: Normal pulses.      Heart sounds: Normal heart sounds.   Pulmonary:      Effort: Respiratory distress present.      Breath sounds: No stridor. Rhonchi present. No wheezing or rales.   Chest:      Chest wall: Tenderness present.   Abdominal:      General: Bowel sounds are normal.      Palpations: Abdomen is soft.      Tenderness: There is no abdominal tenderness.   Musculoskeletal:      Right lower leg: No edema.      Left lower leg: No edema.   Skin:     General: Skin is warm and dry.      Capillary Refill: Capillary refill takes less than 2 seconds.      Findings: Bruising present.   Neurological:      General: No focal deficit present.      Mental Status: She is alert and oriented to person, place, and time.            Additional Data:     Labs:  Results from last 7 days   Lab Units 03/04/24 0453 03/03/24  0600 03/02/24  0541 03/01/24  0447   WBC Thousand/uL 10.75*   < > 9.57 8.82   HEMOGLOBIN g/dL 9.5*   < > 9.7* 10.1*   HEMATOCRIT % 29.8*   < > 31.2* 33.1*   PLATELETS Thousands/uL 237   < > 213 212   BANDS PCT %  --   --  4  --    NEUTROS PCT %  --   --   --  78*   LYMPHS PCT %  --   --   --  12*   LYMPHO PCT %  --   --  10*  --    MONOS PCT %  --   --   --  8   MONO PCT %  --   --  4  --    EOS PCT %  --   --  0 0    < > = values in this interval not displayed.     Results from last 7 days   Lab Units 03/04/24 0453 03/01/24 0447 02/29/24  0624   SODIUM mmol/L 143   < > 142   POTASSIUM mmol/L 3.4*   < > 4.4   CHLORIDE mmol/L 104   < > 105   CO2 mmol/L 34*   < > 30   BUN mg/dL 21   < > 32*   CREATININE mg/dL 0.76   < > 0.88   ANION GAP mmol/L 5   < > 7   CALCIUM mg/dL 8.3*   < > 9.0   ALBUMIN g/dL  --   --  3.1*   TOTAL BILIRUBIN mg/dL  --   --  0.36   ALK PHOS U/L  --   --  69   ALT U/L  --   --  6*   AST U/L  --   --  12*   GLUCOSE RANDOM mg/dL 97   < > 105    < > = values in this  interval not displayed.                 Results from last 7 days   Lab Units 02/29/24  0624 02/28/24  0517   PROCALCITONIN ng/ml 0.56* 0.92*       Lines/Drains:  Invasive Devices       Peripheral Intravenous Line  Duration             Peripheral IV 03/03/24 Right;Ventral (anterior) Forearm 1 day              Drain  Duration             External Urinary Catheter 2 days                      Telemetry:  Telemetry Orders (From admission, onward)               24 Hour Telemetry Monitoring  Continuous x 24 Hours (Telem)        Expiring   Question:  Reason for 24 Hour Telemetry  Answer:  Arrhythmias requiring acute medical intervention / PPM or ICD malfunction                     Telemetry Reviewed: Normal Sinus Rhythm  Indication for Continued Telemetry Use: Acute MI/Unstable Angina/Rule out ACS             Imaging: Reviewed radiology reports from this admission including:   XR chest 1 view portable    Result Date: 2/16/2024  Impression: Mild pulmonary interstitial edema. Workstation performed: [id number]     CT chest with contrast    Result Date: 2/15/2024  Impression: No evidence for pulmonary embolism. Mild pulmonary edema. Scattered areas of tree-in-bud opacity consistent with endobronchial impaction/infection. Workstation performed: [id number]       No Chest XR results available for this patient.     Results for orders placed during the hospital encounter of 02/15/24    Echo complete w/ contrast if indicated    Interpretation Summary    Left Ventricle: Left ventricular cavity size is normal. Wall thickness is mildly increased. There is mild asymmetric hypertrophy of the basal septal wall. The left ventricular ejection fraction is 35%. Systolic function is severely reduced. Diastolic function is mildly abnormal, consistent with grade I (abnormal) relaxation.    The following segments are hypokinetic: mid anteroseptal, mid inferoseptal, apical anterior, apical septal, apical inferior and apex.    Mitral Valve: There is  severe annular calcification.    Tricuspid Valve: There is mild regurgitation.    IVC/SVC: The inferior vena cava is dilated.    Pulmonary Artery: The estimated pulmonary artery systolic pressure is 40.0 mmHg. The pulmonary artery systolic pressure is mildly increased.      Recent Cultures (last 7 days):   Results from last 7 days   Lab Units 02/28/24  1342   LEGIONELLA URINARY ANTIGEN  Negative       Last 24 Hours Medication List:   Current Facility-Administered Medications   Medication Dose Route Frequency Provider Last Rate    acetaminophen  975 mg Oral Q8H PRN Vasu Ramos PA-C      albuterol  2 puff Inhalation Q4H PRN Vasu Ramos PA-C      albuterol  2.5 mg Nebulization Q4H PRN Vasu Ramos PA-C      aluminum-magnesium hydroxide-simethicone  30 mL Oral Q6H PRN Vasu Ramos PA-C      ampicillin-sulbactam  3 g Intravenous Q6H Kenny Stone MD 3 g (03/04/24 1505)    aspirin  81 mg Oral Daily Vasu Ramos PA-C      budesonide-formoterol  2 puff Inhalation BID Vasu Ramos PA-C      buPROPion  75 mg Oral Daily Vasu Ramos PA-C      carvedilol  25 mg Oral BID With Meals Vasu Ramos PA-C      dextromethorphan-guaiFENesin  10 mL Oral Q4H PRN Kenny Stone MD      docusate sodium  100 mg Oral BID Vasu Ramos PA-C      furosemide  20 mg Oral Daily Alcides Rutherford MD      heparin (porcine)  5,000 Units Subcutaneous Q8H [facility] Alcides Rutherford MD      ipratropium  0.5 mg Nebulization TID Herve Jerez PA-C      labetalol  10 mg Intravenous Q6H PRN Vasu Ramos PA-C      levalbuterol  1.25 mg Nebulization TID Herve Jerez PA-C      lidocaine  2 patch Topical Daily PRN Vasu Ramos PA-C      loperamide  2 mg Oral 4x Daily PRN Kenny Stone MD      LORazepam  0.5 mg Oral BID PRN Latia Cortes DO      melatonin  3 mg Oral HS PRN Vasu Ramos PA-C      midodrine  2.5 mg  Oral TID AC Vasu Ramos PA-C      morphine injection  2 mg Intravenous Q6H PRN Kenny Stone MD      nitroglycerin  0.4 mg Sublingual Q5 Min PRN Vasu Ramos PA-C      nystatin  500,000 Units Swish & Swallow 4x Daily Kenny Stone MD      ondansetron  4 mg Intravenous Q6H PRN Vasu Ramos PA-C      pantoprazole  40 mg Oral Daily Vasu Ramos PA-C      pravastatin  80 mg Oral QPM Vasu Ramos PA-C      sodium chloride  4 mL Nebulization TID Herve Jerez PA-C      vilazodone  40 mg Oral Daily With Breakfast Vasu Ramos PA-C      Zanubrutinib  160 mg Oral BID Vasu Ramos PA-C          Today, Patient Was Seen By: Kenny Stone MD and the attending physician, Kenny Stone,*        **Please Note: This note may have been constructed using a voice recognition system.**

## 2024-03-04 NOTE — PLAN OF CARE
Problem: Potential for Falls  Goal: Patient will remain free of falls  Description: INTERVENTIONS:  - Educate patient/family on patient safety including physical limitations  - Instruct patient to call for assistance with activity   - Consult OT/PT to assist with strengthening/mobility   - Keep Call bell within reach  - Keep bed low and locked with side rails adjusted as appropriate  - Keep care items and personal belongings within reach  - Initiate and maintain comfort rounds  - Make Fall Risk Sign visible to staff  - Offer Toileting every 2 Hours, in advance of need  - Initiate/Maintain bed alarm  - Obtain necessary fall risk management equipment  - Apply yellow socks and bracelet for high fall risk patients  - Consider moving patient to room near nurses station  Outcome: Progressing     Problem: RESPIRATORY - ADULT  Goal: Achieves optimal ventilation and oxygenation  Description: INTERVENTIONS:  - Assess for changes in respiratory status  - Assess for changes in mentation and behavior  - Position to facilitate oxygenation and minimize respiratory effort  - Oxygen administered by appropriate delivery if ordered  - Initiate smoking cessation education as indicated  - Encourage broncho-pulmonary hygiene including cough, deep breathe, Incentive Spirometry  - Assess the need for suctioning and aspirate as needed  - Assess and instruct to report SOB or any respiratory difficulty  - Respiratory Therapy support as indicated  Outcome: Progressing     Problem: Prexisting or High Potential for Compromised Skin Integrity  Goal: Skin integrity is maintained or improved  Description: INTERVENTIONS:  - Identify patients at risk for skin breakdown  - Assess and monitor skin integrity  - Assess and monitor nutrition and hydration status  - Monitor labs   - Assess for incontinence   - Turn and reposition patient  - Assist with mobility/ambulation  - Relieve pressure over bony prominences  - Avoid friction and shearing  - Provide  appropriate hygiene as needed including keeping skin clean and dry  - Evaluate need for skin moisturizer/barrier cream  - Collaborate with interdisciplinary team   - Patient/family teaching  - Consider wound care consult   Outcome: Progressing     Problem: PAIN - ADULT  Goal: Verbalizes/displays adequate comfort level or baseline comfort level  Description: Interventions:  - Encourage patient to monitor pain and request assistance  - Assess pain using appropriate pain scale  - Administer analgesics based on type and severity of pain and evaluate response  - Implement non-pharmacological measures as appropriate and evaluate response  - Consider cultural and social influences on pain and pain management  - Notify physician/advanced practitioner if interventions unsuccessful or patient reports new pain  Outcome: Progressing     Problem: INFECTION - ADULT  Goal: Absence or prevention of progression during hospitalization  Description: INTERVENTIONS:  - Assess and monitor for signs and symptoms of infection  - Monitor lab/diagnostic results  - Monitor all insertion sites, i.e. indwelling lines, tubes, and drains  - Monitor endotracheal if appropriate and nasal secretions for changes in amount and color  - [place] appropriate cooling/warming therapies per order  - Administer medications as ordered  - Instruct and encourage patient and family to use good hand hygiene technique  - Identify and instruct in appropriate isolation precautions for identified infection/condition  Outcome: Progressing  Goal: Absence of fever/infection during neutropenic period  Description: INTERVENTIONS:  - Monitor WBC    Outcome: Progressing     Problem: SAFETY ADULT  Goal: Patient will remain free of falls  Description: INTERVENTIONS:  - Educate patient/family on patient safety including physical limitations  - Instruct patient to call for assistance with activity   - Consult OT/PT to assist with strengthening/mobility   - Keep Call bell  within reach  - Keep bed low and locked with side rails adjusted as appropriate  - Keep care items and personal belongings within reach  - Initiate and maintain comfort rounds  - Make Fall Risk Sign visible to staff  - Offer Toileting every 2 Hours, in advance of need  - Initiate/Maintain bed alarm  - Obtain necessary fall risk management equipment  - Apply yellow socks and bracelet for high fall risk patients  - Consider moving patient to room near nurses station  Outcome: Progressing  Goal: Maintain or return to baseline ADL function  Description: INTERVENTIONS:  -  Assess patient's ability to carry out ADLs; assess patient's baseline for ADL function and identify physical deficits which impact ability to perform ADLs (bathing, care of mouth/teeth, toileting, grooming, dressing, etc.)  - Assess/evaluate cause of self-care deficits   - Assess range of motion  - Assess patient's mobility; develop plan if impaired  - Assess patient's need for assistive devices and provide as appropriate  - Encourage maximum independence but intervene and supervise when necessary  - Involve family in performance of ADLs  - Assess for home care needs following discharge   - Consider OT consult to assist with ADL evaluation and planning for discharge  - Provide patient education as appropriate  Outcome: Progressing  Goal: Maintains/Returns to pre admission functional level  Description: INTERVENTIONS:  - Perform AM-PAC 6 Click Basic Mobility/ Daily Activity assessment daily.  - Set and communicate daily mobility goal to care team and patient/family/caregiver.   - Collaborate with rehabilitation services on mobility goals if consulted  - Perform Range of Motion 3 times a day.  - Reposition patient every 2 hours.  - Dangle patient 3 times a day  - Stand patient 3 times a day  - Ambulate patient 3 times a day  - Out of bed to chair 3 times a day   - Out of bed for meals 3 times a day  - Out of bed for toileting  - Record patient progress  and toleration of activity level   Outcome: Progressing     Problem: DISCHARGE PLANNING  Goal: Discharge to home or other facility with appropriate resources  Description: INTERVENTIONS:  - Identify barriers to discharge w/patient and caregiver  - Arrange for needed discharge resources and transportation as appropriate  - Identify discharge learning needs (meds, wound care, etc.)  - Arrange for interpretive services to assist at discharge as needed  - Refer to Case Management Department for coordinating discharge planning if the patient needs post-hospital services based on physician/advanced practitioner order or complex needs related to functional status, cognitive ability, or social support system  Outcome: Progressing     Problem: Knowledge Deficit  Goal: Patient/family/caregiver demonstrates understanding of disease process, treatment plan, medications, and discharge instructions  Description: Complete learning assessment and assess knowledge base.  Interventions:  - Provide teaching at level of understanding  - Provide teaching via preferred learning methods  Outcome: Progressing     Problem: MOBILITY - ADULT  Goal: Maintain or return to baseline ADL function  Description: INTERVENTIONS:  -  Assess patient's ability to carry out ADLs; assess patient's baseline for ADL function and identify physical deficits which impact ability to perform ADLs (bathing, care of mouth/teeth, toileting, grooming, dressing, etc.)  - Assess/evaluate cause of self-care deficits   - Assess range of motion  - Assess patient's mobility; develop plan if impaired  - Assess patient's need for assistive devices and provide as appropriate  - Encourage maximum independence but intervene and supervise when necessary  - Involve family in performance of ADLs  - Assess for home care needs following discharge   - Consider OT consult to assist with ADL evaluation and planning for discharge  - Provide patient education as appropriate  Outcome:  Progressing  Goal: Maintains/Returns to pre admission functional level  Description: INTERVENTIONS:  - Perform AM-PAC 6 Click Basic Mobility/ Daily Activity assessment daily.  - Set and communicate daily mobility goal to care team and patient/family/caregiver.   - Collaborate with rehabilitation services on mobility goals if consulted  - Perform Range of Motion 3 times a day.  - Reposition patient every 2 hours.  - Dangle patient 3 times a day  - Stand patient 3 times a day  - Ambulate patient 3 times a day  - Out of bed to chair 3 times a day   - Out of bed for meals 3 times a day  - Out of bed for toileting  - Record patient progress and toleration of activity level   Outcome: Progressing     Problem: Nutrition/Hydration-ADULT  Goal: Nutrient/Hydration intake appropriate for improving, restoring or maintaining nutritional needs  Description: Monitor and assess patient's nutrition/hydration status for malnutrition. Collaborate with interdisciplinary team and initiate plan and interventions as ordered.  Monitor patient's weight and dietary intake as ordered or per policy. Utilize nutrition screening tool and intervene as necessary. Determine patient's food preferences and provide high-protein, high-caloric foods as appropriate.     INTERVENTIONS:  - Monitor oral intake, urinary output, labs, and treatment plans  - Assess nutrition and hydration status and recommend course of action  - Evaluate amount of meals eaten  - Assist patient with eating if necessary   - Allow adequate time for meals  - Recommend/ encourage appropriate diets, oral nutritional supplements, and vitamin/mineral supplements  - Order, calculate, and assess calorie counts as needed  - Recommend, monitor, and adjust tube feedings and TPN/PPN based on assessed needs  - Assess need for intravenous fluids  - Provide specific nutrition/hydration education as appropriate  - Include patient/family/caregiver in decisions related to nutrition  Outcome:  Progressing

## 2024-03-04 NOTE — PLAN OF CARE
Problem: INFECTION - ADULT  Goal: Absence or prevention of progression during hospitalization  Description: INTERVENTIONS:  - Assess and monitor for signs and symptoms of infection  - Monitor lab/diagnostic results  - Monitor all insertion sites, i.e. indwelling lines, tubes, and drains  - Monitor endotracheal if appropriate and nasal secretions for changes in amount and color  - [place] appropriate cooling/warming therapies per order  - Administer medications as ordered  - Instruct and encourage patient and family to use good hand hygiene technique  - Identify and instruct in appropriate isolation precautions for identified infection/condition  Outcome: Progressing

## 2024-03-04 NOTE — PROGRESS NOTES
"Progress Note - Pulmonary   Lauren Quintero 78 y.o. female MRN: [id number]  Unit/Bed#: -01 Encounter: [id number]    Assessment:  Acute on chronic hypoxemic respiratory failure  COVID-19 pneumonia  Superimposed bacterial pneumonia with abnormal chest CT  COPD with acute exacerbation  Triple-vessel CAD    Plan:  Acute on chronic hypoxemic respiratory failure multifactorial in the setting of COVID-19 pneumonia, COPD exacerbation, aspiration pneumonia, triple-vessel CAD and NSTEMI  Currently on 3 L nasal cannula, recently had been on 4 L at baseline  Large consolidation in the right lower lobe with opacification of the right lower lobe bronchus, suspect related to aspiration, speech therapy is following  Today is day 6 of antibiotics, can complete a 7-day course  Still with pleuritic pain as well as wet cough and now with some dark hemoptysis-probably all related to the large consolidation and aspiration pneumonia  Will repeat a chest x-ray this morning  Continue airway clearance measures with flutter valve, IS.  Continue Xopenex, Atrovent, hypertonic saline  OOB to chair/ambulate as able to aid in mucus clearance and lung expansion  Continue Symbicort, will discontinue steroids as she has received an adequate course, no further wheezing on exam  Planned for discharge to rehab facility  Will continue to follow    Subjective:   Patient resting in bed. She has been having some dark hemoptysis the last couple of days. Still with pleuritic right sided chest pain.     Objective:     Vitals: Blood pressure 166/94, pulse 101, temperature 97.8 °F (36.6 °C), resp. rate 19, height 5' 8\" (1.727 m), weight 59.9 kg (132 lb), SpO2 97%, not currently breastfeeding.,Body mass index is 20.07 kg/m².      Intake/Output Summary (Last 24 hours) at 3/4/2024 1043  Last data filed at 3/4/2024 0900  Gross per 24 hour   Intake 320 ml   Output 1500 ml   Net -1180 ml       Invasive Devices       Peripheral Intravenous Line  Duration             " "Peripheral IV 03/03/24 Right;Ventral (anterior) Forearm 1 day              Drain  Duration             External Urinary Catheter 2 days                    Physical Exam: /94   Pulse 101   Temp 97.8 °F (36.6 °C)   Resp 19   Ht 5' 8\" (1.727 m)   Wt 59.9 kg (132 lb)   SpO2 97%   BMI 20.07 kg/m²   General appearance: alert and oriented, in no acute distress  Head: Normocephalic, without obvious abnormality, atraumatic  Eyes: negative findings: conjunctivae and sclerae normal  Lungs:  Diminished right base, scattered rhonchi, wet cough  Heart: regular rate and rhythm  Abdomen: normal findings: soft, non-tender  Extremities:  no edema  Skin:  warm and dry  Neurologic: Mental status: Alert, oriented, thought content appropriate     Labs: I have personally reviewed pertinent lab results., CBC:   Lab Results   Component Value Date    WBC 10.75 (H) 03/04/2024    HGB 9.5 (L) 03/04/2024    HCT 29.8 (L) 03/04/2024    MCV 95 03/04/2024     03/04/2024    RBC 3.14 (L) 03/04/2024    MCH 30.3 03/04/2024    MCHC 31.9 03/04/2024    RDW 13.1 03/04/2024    MPV 10.6 03/04/2024   , CMP:   Lab Results   Component Value Date    SODIUM 143 03/04/2024    K 3.4 (L) 03/04/2024     03/04/2024    CO2 34 (H) 03/04/2024    BUN 21 03/04/2024    CREATININE 0.76 03/04/2024    CALCIUM 8.3 (L) 03/04/2024    EGFR 75 03/04/2024     Imaging and other studies: I have personally reviewed pertinent reports.   and I have personally reviewed pertinent films in PACS    "

## 2024-03-04 NOTE — ASSESSMENT & PLAN NOTE
Lab Results   Component Value Date    EGFR 75 03/04/2024    EGFR 73 03/03/2024    EGFR 72 03/02/2024    CREATININE 0.76 03/04/2024    CREATININE 0.78 03/03/2024    CREATININE 0.79 03/02/2024     Pt has a history of CKD stage 2  Transitioned to po lasix 20 mg daily,   Cont to monitor bmp

## 2024-03-04 NOTE — PROGRESS NOTES
Spiritual Care Progress Note    3/4/2024  Patient: Lauren Quintero : 1945  Admission Date & Time: 2/15/2024 1713  MRN: [id number] CSN: [id number]     attempted to visit patient per CM referral. Patient resting comfortably upon attempts,  opted not to wake patient. Spiritual care remains available.     24 1500   Clinical Encounter Type   Visited With Patient not available   Referral From

## 2024-03-05 PROBLEM — U07.1 COVID-19: Status: RESOLVED | Noted: 2024-02-15 | Resolved: 2024-03-05

## 2024-03-05 LAB
ANION GAP SERPL CALCULATED.3IONS-SCNC: 7 MMOL/L
BUN SERPL-MCNC: 20 MG/DL (ref 5–25)
CALCIUM SERPL-MCNC: 8.4 MG/DL (ref 8.4–10.2)
CHLORIDE SERPL-SCNC: 101 MMOL/L (ref 96–108)
CO2 SERPL-SCNC: 33 MMOL/L (ref 21–32)
CREAT SERPL-MCNC: 0.79 MG/DL (ref 0.6–1.3)
ERYTHROCYTE [DISTWIDTH] IN BLOOD BY AUTOMATED COUNT: 13.2 % (ref 11.6–15.1)
GFR SERPL CREATININE-BSD FRML MDRD: 72 ML/MIN/1.73SQ M
GLUCOSE SERPL-MCNC: 142 MG/DL (ref 65–140)
HCT VFR BLD AUTO: 30.7 % (ref 34.8–46.1)
HGB BLD-MCNC: 9.8 G/DL (ref 11.5–15.4)
MCH RBC QN AUTO: 30.6 PG (ref 26.8–34.3)
MCHC RBC AUTO-ENTMCNC: 31.9 G/DL (ref 31.4–37.4)
MCV RBC AUTO: 96 FL (ref 82–98)
NRBC BLD AUTO-RTO: 0 /100 WBCS
PLATELET # BLD AUTO: 243 THOUSANDS/UL (ref 149–390)
PMV BLD AUTO: 10.6 FL (ref 8.9–12.7)
POTASSIUM SERPL-SCNC: 3.3 MMOL/L (ref 3.5–5.3)
PROCALCITONIN SERPL-MCNC: 0.11 NG/ML
RBC # BLD AUTO: 3.2 MILLION/UL (ref 3.81–5.12)
SODIUM SERPL-SCNC: 141 MMOL/L (ref 135–147)
WBC # BLD AUTO: 11.4 THOUSAND/UL (ref 4.31–10.16)

## 2024-03-05 PROCEDURE — 85027 COMPLETE CBC AUTOMATED: CPT | Performed by: INTERNAL MEDICINE

## 2024-03-05 PROCEDURE — 99232 SBSQ HOSP IP/OBS MODERATE 35: CPT | Performed by: PHYSICIAN ASSISTANT

## 2024-03-05 PROCEDURE — 92526 ORAL FUNCTION THERAPY: CPT

## 2024-03-05 PROCEDURE — 94640 AIRWAY INHALATION TREATMENT: CPT

## 2024-03-05 PROCEDURE — 99239 HOSP IP/OBS DSCHRG MGMT >30: CPT | Performed by: STUDENT IN AN ORGANIZED HEALTH CARE EDUCATION/TRAINING PROGRAM

## 2024-03-05 PROCEDURE — 80048 BASIC METABOLIC PNL TOTAL CA: CPT | Performed by: INTERNAL MEDICINE

## 2024-03-05 PROCEDURE — 94760 N-INVAS EAR/PLS OXIMETRY 1: CPT

## 2024-03-05 PROCEDURE — 84145 PROCALCITONIN (PCT): CPT | Performed by: INTERNAL MEDICINE

## 2024-03-05 RX ORDER — FUROSEMIDE 20 MG/1
20 TABLET ORAL DAILY
Qty: 30 TABLET | Refills: 0
Start: 2024-03-06 | End: 2024-04-05

## 2024-03-05 RX ORDER — PRAVASTATIN SODIUM 80 MG/1
80 TABLET ORAL EVERY EVENING
Qty: 30 TABLET | Refills: 0
Start: 2024-03-05 | End: 2024-04-04

## 2024-03-05 RX ORDER — POTASSIUM CHLORIDE 20 MEQ/1
40 TABLET, EXTENDED RELEASE ORAL ONCE
Status: COMPLETED | OUTPATIENT
Start: 2024-03-05 | End: 2024-03-05

## 2024-03-05 RX ORDER — MIDODRINE HYDROCHLORIDE 2.5 MG/1
2.5 TABLET ORAL
Qty: 90 TABLET | Refills: 0
Start: 2024-03-05 | End: 2024-04-04

## 2024-03-05 RX ORDER — GUAIFENESIN/DEXTROMETHORPHAN 100-10MG/5
10 SYRUP ORAL EVERY 4 HOURS PRN
Qty: 100 ML | Refills: 0
Start: 2024-03-05 | End: 2024-03-15

## 2024-03-05 RX ADMIN — LEVALBUTEROL HYDROCHLORIDE 1.25 MG: 1.25 SOLUTION RESPIRATORY (INHALATION) at 07:28

## 2024-03-05 RX ADMIN — POTASSIUM CHLORIDE 40 MEQ: 1500 TABLET, EXTENDED RELEASE ORAL at 15:02

## 2024-03-05 RX ADMIN — IPRATROPIUM BROMIDE 0.5 MG: 0.5 SOLUTION RESPIRATORY (INHALATION) at 07:28

## 2024-03-05 RX ADMIN — PANTOPRAZOLE SODIUM 40 MG: 40 TABLET, DELAYED RELEASE ORAL at 08:47

## 2024-03-05 RX ADMIN — ZANUBRUTINIB 160 MG: 80 CAPSULE, GELATIN COATED ORAL at 08:17

## 2024-03-05 RX ADMIN — GUAIFENESIN AND DEXTROMETHORPHAN 10 ML: 100; 10 SYRUP ORAL at 05:07

## 2024-03-05 RX ADMIN — CARVEDILOL 25 MG: 12.5 TABLET, FILM COATED ORAL at 08:16

## 2024-03-05 RX ADMIN — MIDODRINE HYDROCHLORIDE 2.5 MG: 5 TABLET ORAL at 15:02

## 2024-03-05 RX ADMIN — AMPICILLIN SODIUM AND SULBACTAM SODIUM 3 G: 100; 50 INJECTION, POWDER, FOR SOLUTION INTRAVENOUS at 08:43

## 2024-03-05 RX ADMIN — ACETAMINOPHEN 975 MG: 325 TABLET, FILM COATED ORAL at 15:13

## 2024-03-05 RX ADMIN — Medication 3 MG: at 22:25

## 2024-03-05 RX ADMIN — SODIUM CHLORIDE SOLN NEBU 3% 4 ML: 3 NEBU SOLN at 20:01

## 2024-03-05 RX ADMIN — SODIUM CHLORIDE SOLN NEBU 3% 4 ML: 3 NEBU SOLN at 07:28

## 2024-03-05 RX ADMIN — DOCUSATE SODIUM 100 MG: 100 CAPSULE, LIQUID FILLED ORAL at 08:47

## 2024-03-05 RX ADMIN — GUAIFENESIN AND DEXTROMETHORPHAN 10 ML: 100; 10 SYRUP ORAL at 01:18

## 2024-03-05 RX ADMIN — POTASSIUM CHLORIDE 40 MEQ: 1500 TABLET, EXTENDED RELEASE ORAL at 08:47

## 2024-03-05 RX ADMIN — BUPROPION HYDROCHLORIDE 75 MG: 75 TABLET, FILM COATED ORAL at 08:48

## 2024-03-05 RX ADMIN — VILAZODONE HYDROCHLORIDE 40 MG: 20 TABLET ORAL at 09:24

## 2024-03-05 RX ADMIN — LEVALBUTEROL HYDROCHLORIDE 1.25 MG: 1.25 SOLUTION RESPIRATORY (INHALATION) at 13:08

## 2024-03-05 RX ADMIN — MORPHINE SULFATE 2 MG: 2 INJECTION, SOLUTION INTRAMUSCULAR; INTRAVENOUS at 18:20

## 2024-03-05 RX ADMIN — IPRATROPIUM BROMIDE 0.5 MG: 0.5 SOLUTION RESPIRATORY (INHALATION) at 13:08

## 2024-03-05 RX ADMIN — ZANUBRUTINIB 160 MG: 80 CAPSULE, GELATIN COATED ORAL at 17:57

## 2024-03-05 RX ADMIN — MIDODRINE HYDROCHLORIDE 2.5 MG: 5 TABLET ORAL at 11:58

## 2024-03-05 RX ADMIN — GUAIFENESIN AND DEXTROMETHORPHAN 10 ML: 100; 10 SYRUP ORAL at 10:05

## 2024-03-05 RX ADMIN — FUROSEMIDE 20 MG: 20 TABLET ORAL at 08:47

## 2024-03-05 RX ADMIN — LIDOCAINE 5% 2 PATCH: 700 PATCH TOPICAL at 15:14

## 2024-03-05 RX ADMIN — IPRATROPIUM BROMIDE 0.5 MG: 0.5 SOLUTION RESPIRATORY (INHALATION) at 20:02

## 2024-03-05 RX ADMIN — LORAZEPAM 0.5 MG: 0.5 TABLET ORAL at 22:25

## 2024-03-05 RX ADMIN — GUAIFENESIN AND DEXTROMETHORPHAN 10 ML: 100; 10 SYRUP ORAL at 22:25

## 2024-03-05 RX ADMIN — ASPIRIN 81 MG: 81 TABLET, COATED ORAL at 08:48

## 2024-03-05 RX ADMIN — ACETAMINOPHEN 975 MG: 325 TABLET, FILM COATED ORAL at 05:07

## 2024-03-05 RX ADMIN — HEPARIN SODIUM 5000 UNITS: 5000 INJECTION INTRAVENOUS; SUBCUTANEOUS at 15:02

## 2024-03-05 RX ADMIN — LEVALBUTEROL HYDROCHLORIDE 1.25 MG: 1.25 SOLUTION RESPIRATORY (INHALATION) at 20:02

## 2024-03-05 RX ADMIN — HEPARIN SODIUM 5000 UNITS: 5000 INJECTION INTRAVENOUS; SUBCUTANEOUS at 05:07

## 2024-03-05 RX ADMIN — PRAVASTATIN SODIUM 80 MG: 80 TABLET ORAL at 17:57

## 2024-03-05 RX ADMIN — CARVEDILOL 25 MG: 12.5 TABLET, FILM COATED ORAL at 17:56

## 2024-03-05 RX ADMIN — HEPARIN SODIUM 5000 UNITS: 5000 INJECTION INTRAVENOUS; SUBCUTANEOUS at 21:17

## 2024-03-05 RX ADMIN — SODIUM CHLORIDE SOLN NEBU 3% 4 ML: 3 NEBU SOLN at 13:08

## 2024-03-05 RX ADMIN — GUAIFENESIN AND DEXTROMETHORPHAN 10 ML: 100; 10 SYRUP ORAL at 15:13

## 2024-03-05 RX ADMIN — AMPICILLIN SODIUM AND SULBACTAM SODIUM 3 G: 100; 50 INJECTION, POWDER, FOR SOLUTION INTRAVENOUS at 02:06

## 2024-03-05 RX ADMIN — MIDODRINE HYDROCHLORIDE 2.5 MG: 5 TABLET ORAL at 08:16

## 2024-03-05 RX ADMIN — DOCUSATE SODIUM 100 MG: 100 CAPSULE, LIQUID FILLED ORAL at 17:56

## 2024-03-05 NOTE — ASSESSMENT & PLAN NOTE
Lab Results   Component Value Date    HSTNI0 2,584 (H) 02/17/2024    HSTNI2 2,049 (H) 02/17/2024    HSTNID2 -535 02/17/2024     Presented with elevated troponins, SOB, chest pain  ADRIEN Score: 5  Cardiac cath showed significant triple vessel disease  Cardiology recommends maximizing medical therapy for her CAD and follow-up outpatient with a CT surgeon for a potential CABG once she fully recovers from COVID. As per cardiac cath report could also be Takotsubo cardiomyopathy  Patient has ischemic cardiomyopathy with reduced EF of 35%, Lifevest has been fitted -telemetry monitoring as per protocol

## 2024-03-05 NOTE — PLAN OF CARE
Problem: Potential for Falls  Goal: Patient will remain free of falls  Description: INTERVENTIONS:  - Educate patient/family on patient safety including physical limitations  - Instruct patient to call for assistance with activity   - Consult OT/PT to assist with strengthening/mobility   - Keep Call bell within reach  - Keep bed low and locked with side rails adjusted as appropriate  - Keep care items and personal belongings within reach  - Initiate and maintain comfort rounds  - Make Fall Risk Sign visible to staff  - Offer Toileting every 2 Hours, in advance of need  - Initiate/Maintain bed alarm  - Obtain necessary fall risk management equipment  - Apply yellow socks and bracelet for high fall risk patients  - Consider moving patient to room near nurses station  Outcome: Progressing     Problem: RESPIRATORY - ADULT  Goal: Achieves optimal ventilation and oxygenation  Description: INTERVENTIONS:  - Assess for changes in respiratory status  - Assess for changes in mentation and behavior  - Position to facilitate oxygenation and minimize respiratory effort  - Oxygen administered by appropriate delivery if ordered  - Initiate smoking cessation education as indicated  - Encourage broncho-pulmonary hygiene including cough, deep breathe, Incentive Spirometry  - Assess the need for suctioning and aspirate as needed  - Assess and instruct to report SOB or any respiratory difficulty  - Respiratory Therapy support as indicated  Outcome: Progressing     Problem: Prexisting or High Potential for Compromised Skin Integrity  Goal: Skin integrity is maintained or improved  Description: INTERVENTIONS:  - Identify patients at risk for skin breakdown  - Assess and monitor skin integrity  - Assess and monitor nutrition and hydration status  - Monitor labs   - Assess for incontinence   - Turn and reposition patient  - Assist with mobility/ambulation  - Relieve pressure over bony prominences  - Avoid friction and shearing  - Provide  appropriate hygiene as needed including keeping skin clean and dry  - Evaluate need for skin moisturizer/barrier cream  - Collaborate with interdisciplinary team   - Patient/family teaching  - Consider wound care consult   Outcome: Progressing     Problem: PAIN - ADULT  Goal: Verbalizes/displays adequate comfort level or baseline comfort level  Description: Interventions:  - Encourage patient to monitor pain and request assistance  - Assess pain using appropriate pain scale  - Administer analgesics based on type and severity of pain and evaluate response  - Implement non-pharmacological measures as appropriate and evaluate response  - Consider cultural and social influences on pain and pain management  - Notify physician/advanced practitioner if interventions unsuccessful or patient reports new pain  Outcome: Progressing     Problem: INFECTION - ADULT  Goal: Absence or prevention of progression during hospitalization  Description: INTERVENTIONS:  - Assess and monitor for signs and symptoms of infection  - Monitor lab/diagnostic results  - Monitor all insertion sites, i.e. indwelling lines, tubes, and drains  - Monitor endotracheal if appropriate and nasal secretions for changes in amount and color  - [place] appropriate cooling/warming therapies per order  - Administer medications as ordered  - Instruct and encourage patient and family to use good hand hygiene technique  - Identify and instruct in appropriate isolation precautions for identified infection/condition  Outcome: Progressing  Goal: Absence of fever/infection during neutropenic period  Description: INTERVENTIONS:  - Monitor WBC    Outcome: Progressing     Problem: SAFETY ADULT  Goal: Patient will remain free of falls  Description: INTERVENTIONS:  - Educate patient/family on patient safety including physical limitations  - Instruct patient to call for assistance with activity   - Consult OT/PT to assist with strengthening/mobility   - Keep Call bell  within reach  - Keep bed low and locked with side rails adjusted as appropriate  - Keep care items and personal belongings within reach  - Initiate and maintain comfort rounds  - Make Fall Risk Sign visible to staff  - Offer Toileting every 2 Hours, in advance of need  - Initiate/Maintain bed alarm  - Obtain necessary fall risk management equipment  - Apply yellow socks and bracelet for high fall risk patients  - Consider moving patient to room near nurses station  Outcome: Progressing  Goal: Maintain or return to baseline ADL function  Description: INTERVENTIONS:  -  Assess patient's ability to carry out ADLs; assess patient's baseline for ADL function and identify physical deficits which impact ability to perform ADLs (bathing, care of mouth/teeth, toileting, grooming, dressing, etc.)  - Assess/evaluate cause of self-care deficits   - Assess range of motion  - Assess patient's mobility; develop plan if impaired  - Assess patient's need for assistive devices and provide as appropriate  - Encourage maximum independence but intervene and supervise when necessary  - Involve family in performance of ADLs  - Assess for home care needs following discharge   - Consider OT consult to assist with ADL evaluation and planning for discharge  - Provide patient education as appropriate  Outcome: Progressing  Goal: Maintains/Returns to pre admission functional level  Description: INTERVENTIONS:  - Perform AM-PAC 6 Click Basic Mobility/ Daily Activity assessment daily.  - Set and communicate daily mobility goal to care team and patient/family/caregiver.   - Collaborate with rehabilitation services on mobility goals if consulted  - Perform Range of Motion 3 times a day.  - Reposition patient every 2 hours.  - Dangle patient 3 times a day  - Stand patient 3 times a day  - Ambulate patient 3 times a day  - Out of bed to chair 3 times a day   - Out of bed for meals 3 times a day  - Out of bed for toileting  - Record patient progress  and toleration of activity level   Outcome: Progressing     Problem: DISCHARGE PLANNING  Goal: Discharge to home or other facility with appropriate resources  Description: INTERVENTIONS:  - Identify barriers to discharge w/patient and caregiver  - Arrange for needed discharge resources and transportation as appropriate  - Identify discharge learning needs (meds, wound care, etc.)  - Arrange for interpretive services to assist at discharge as needed  - Refer to Case Management Department for coordinating discharge planning if the patient needs post-hospital services based on physician/advanced practitioner order or complex needs related to functional status, cognitive ability, or social support system  Outcome: Progressing     Problem: Knowledge Deficit  Goal: Patient/family/caregiver demonstrates understanding of disease process, treatment plan, medications, and discharge instructions  Description: Complete learning assessment and assess knowledge base.  Interventions:  - Provide teaching at level of understanding  - Provide teaching via preferred learning methods  Outcome: Progressing     Problem: MOBILITY - ADULT  Goal: Maintain or return to baseline ADL function  Description: INTERVENTIONS:  -  Assess patient's ability to carry out ADLs; assess patient's baseline for ADL function and identify physical deficits which impact ability to perform ADLs (bathing, care of mouth/teeth, toileting, grooming, dressing, etc.)  - Assess/evaluate cause of self-care deficits   - Assess range of motion  - Assess patient's mobility; develop plan if impaired  - Assess patient's need for assistive devices and provide as appropriate  - Encourage maximum independence but intervene and supervise when necessary  - Involve family in performance of ADLs  - Assess for home care needs following discharge   - Consider OT consult to assist with ADL evaluation and planning for discharge  - Provide patient education as appropriate  Outcome:  Progressing  Goal: Maintains/Returns to pre admission functional level  Description: INTERVENTIONS:  - Perform AM-PAC 6 Click Basic Mobility/ Daily Activity assessment daily.  - Set and communicate daily mobility goal to care team and patient/family/caregiver.   - Collaborate with rehabilitation services on mobility goals if consulted  - Perform Range of Motion 3 times a day.  - Reposition patient every 2 hours.  - Dangle patient 3 times a day  - Stand patient 3 times a day  - Ambulate patient 3 times a day  - Out of bed to chair 3 times a day   - Out of bed for meals 3 times a day  - Out of bed for toileting  - Record patient progress and toleration of activity level   Outcome: Progressing     Problem: Nutrition/Hydration-ADULT  Goal: Nutrient/Hydration intake appropriate for improving, restoring or maintaining nutritional needs  Description: Monitor and assess patient's nutrition/hydration status for malnutrition. Collaborate with interdisciplinary team and initiate plan and interventions as ordered.  Monitor patient's weight and dietary intake as ordered or per policy. Utilize nutrition screening tool and intervene as necessary. Determine patient's food preferences and provide high-protein, high-caloric foods as appropriate.     INTERVENTIONS:  - Monitor oral intake, urinary output, labs, and treatment plans  - Assess nutrition and hydration status and recommend course of action  - Evaluate amount of meals eaten  - Assist patient with eating if necessary   - Allow adequate time for meals  - Recommend/ encourage appropriate diets, oral nutritional supplements, and vitamin/mineral supplements  - Order, calculate, and assess calorie counts as needed  - Recommend, monitor, and adjust tube feedings and TPN/PPN based on assessed needs  - Assess need for intravenous fluids  - Provide specific nutrition/hydration education as appropriate  - Include patient/family/caregiver in decisions related to nutrition  Outcome:  Progressing

## 2024-03-05 NOTE — SPEECH THERAPY NOTE
"Speech Language/Pathology     Speech/Language Pathology Progress Note     Patient Name: Lauren Quintero    Today's Date: 3/5/2024     Problem List  Principal Problem:    Acute on chronic respiratory failure with hypoxia (HCC)  Active Problems:    Essential hypertension    Major depressive disorder, recurrent episode, moderate (HCC)    COPD with exacerbation (HCC)    COVID-19    CKD (chronic kidney disease)    Elevated troponin       Subjective:  Patient received awake and alert, seated upright in bed.  Breakfast tray set up by this writer. \"I don't mind the softer foods\"    Previous/current diet: dys3/thin     Objective:  The following consistencies were tested: soft/dysphagia 3, thin liquids   Patient presents with WFL bolus containment, manipulation and control.  Mastication judged to be prolonged yet complete and functional for softer options.  Episodic cough, not suspected to be related to PO, though cannot r/o chronic aspiration of secretions.    No immediate overt s/s of aspiration or distress.      Assessment:  Oropharyngeal swallow function appears same.  Given ongoing improvement in pulmonary status/pna w/ abx, pt comfortable on baseline 4L NC, no concerns w/ oral intake and MBS findings - suspect baseline swallow function and mgmt of present diet.        Plan:  Dysphagia 3/thin  Meds whole c puree - cut/crush larger  No further ST follow-up; pt at baseline and prefers current diet  Please re-order should pt status change or concerns arise      Corine Gastelum MS, CCC-SLP  Speech-Language Pathologist  [place] #[id number]  [place] #[id number]   "

## 2024-03-05 NOTE — CASE MANAGEMENT
[facility] received a [employer] appeal.   Case Control ID: [id number]  EMR Key:[id number]  Clinicals attached via [employer] online portal. Appeal is pending.     Care Manager notified:  melissa hurtado

## 2024-03-05 NOTE — PLAN OF CARE
Dysphagia 3/thin  Meds whole c puree - cut/crush larger  No further ST follow-up; pt at baseline and prefers current diet  Please re-order should pt status change or concerns arise

## 2024-03-05 NOTE — PLAN OF CARE
Problem: Potential for Falls  Goal: Patient will remain free of falls  Description: INTERVENTIONS:  - Educate patient/family on patient safety including physical limitations  - Instruct patient to call for assistance with activity   - Consult OT/PT to assist with strengthening/mobility   - Keep Call bell within reach  - Keep bed low and locked with side rails adjusted as appropriate  - Keep care items and personal belongings within reach  - Initiate and maintain comfort rounds  - Make Fall Risk Sign visible to staff  - Offer Toileting every  Hours, in advance of need  - Initiate/Maintain alarm  - Obtain necessary fall risk management equipment:   - Apply yellow socks and bracelet for high fall risk patients  - Consider moving patient to room near nurses station  Outcome: Progressing     Problem: RESPIRATORY - ADULT  Goal: Achieves optimal ventilation and oxygenation  Description: INTERVENTIONS:  - Assess for changes in respiratory status  - Assess for changes in mentation and behavior  - Position to facilitate oxygenation and minimize respiratory effort  - Oxygen administered by appropriate delivery if ordered  - Initiate smoking cessation education as indicated  - Encourage broncho-pulmonary hygiene including cough, deep breathe, Incentive Spirometry  - Assess the need for suctioning and aspirate as needed  - Assess and instruct to report SOB or any respiratory difficulty  - Respiratory Therapy support as indicated  Outcome: Progressing     Problem: Prexisting or High Potential for Compromised Skin Integrity  Goal: Skin integrity is maintained or improved  Description: INTERVENTIONS:  - Identify patients at risk for skin breakdown  - Assess and monitor skin integrity  - Assess and monitor nutrition and hydration status  - Monitor labs   - Assess for incontinence   - Turn and reposition patient  - Assist with mobility/ambulation  - Relieve pressure over bony prominences  - Avoid friction and shearing  - Provide  appropriate hygiene as needed including keeping skin clean and dry  - Evaluate need for skin moisturizer/barrier cream  - Collaborate with interdisciplinary team   - Patient/family teaching  - Consider wound care consult   Outcome: Progressing     Problem: PAIN - ADULT  Goal: Verbalizes/displays adequate comfort level or baseline comfort level  Description: Interventions:  - Encourage patient to monitor pain and request assistance  - Assess pain using appropriate pain scale  - Administer analgesics based on type and severity of pain and evaluate response  - Implement non-pharmacological measures as appropriate and evaluate response  - Consider cultural and social influences on pain and pain management  - Notify physician/advanced practitioner if interventions unsuccessful or patient reports new pain  Outcome: Progressing     Problem: INFECTION - ADULT  Goal: Absence or prevention of progression during hospitalization  Description: INTERVENTIONS:  - Assess and monitor for signs and symptoms of infection  - Monitor lab/diagnostic results  - Monitor all insertion sites, i.e. indwelling lines, tubes, and drains  - Monitor endotracheal if appropriate and nasal secretions for changes in amount and color  - [place] appropriate cooling/warming therapies per order  - Administer medications as ordered  - Instruct and encourage patient and family to use good hand hygiene technique  - Identify and instruct in appropriate isolation precautions for identified infection/condition  Outcome: Progressing  Goal: Absence of fever/infection during neutropenic period  Description: INTERVENTIONS:  - Monitor WBC    Outcome: Progressing     Problem: SAFETY ADULT  Goal: Patient will remain free of falls  Description: INTERVENTIONS:  - Educate patient/family on patient safety including physical limitations  - Instruct patient to call for assistance with activity   - Consult OT/PT to assist with strengthening/mobility   - Keep Call bell  within reach  - Keep bed low and locked with side rails adjusted as appropriate  - Keep care items and personal belongings within reach  - Initiate and maintain comfort rounds  - Make Fall Risk Sign visible to staff  - Offer Toileting every  Hours, in advance of need  - Initiate/Maintain alarm  - Obtain necessary fall risk management equipment:   - Apply yellow socks and bracelet for high fall risk patients  - Consider moving patient to room near nurses station  Outcome: Progressing  Goal: Maintain or return to baseline ADL function  Description: INTERVENTIONS:  -  Assess patient's ability to carry out ADLs; assess patient's baseline for ADL function and identify physical deficits which impact ability to perform ADLs (bathing, care of mouth/teeth, toileting, grooming, dressing, etc.)  - Assess/evaluate cause of self-care deficits   - Assess range of motion  - Assess patient's mobility; develop plan if impaired  - Assess patient's need for assistive devices and provide as appropriate  - Encourage maximum independence but intervene and supervise when necessary  - Involve family in performance of ADLs  - Assess for home care needs following discharge   - Consider OT consult to assist with ADL evaluation and planning for discharge  - Provide patient education as appropriate  Outcome: Progressing  Goal: Maintains/Returns to pre admission functional level  Description: INTERVENTIONS:  - Perform AM-PAC 6 Click Basic Mobility/ Daily Activity assessment daily.  - Set and communicate daily mobility goal to care team and patient/family/caregiver.   - Collaborate with rehabilitation services on mobility goals if consulted  - Perform Range of Motion  times a day.  - Reposition patient every  hours.  - Dangle patient  times a day  - Stand patient  times a day  - Ambulate patient  times a day  - Out of bed to chair  times a day   - Out of bed for meals  times a day  - Out of bed for toileting  - Record patient progress and  toleration of activity level   Outcome: Progressing     Problem: DISCHARGE PLANNING  Goal: Discharge to home or other facility with appropriate resources  Description: INTERVENTIONS:  - Identify barriers to discharge w/patient and caregiver  - Arrange for needed discharge resources and transportation as appropriate  - Identify discharge learning needs (meds, wound care, etc.)  - Arrange for interpretive services to assist at discharge as needed  - Refer to Case Management Department for coordinating discharge planning if the patient needs post-hospital services based on physician/advanced practitioner order or complex needs related to functional status, cognitive ability, or social support system  Outcome: Progressing     Problem: Knowledge Deficit  Goal: Patient/family/caregiver demonstrates understanding of disease process, treatment plan, medications, and discharge instructions  Description: Complete learning assessment and assess knowledge base.  Interventions:  - Provide teaching at level of understanding  - Provide teaching via preferred learning methods  Outcome: Progressing     Problem: MOBILITY - ADULT  Goal: Maintain or return to baseline ADL function  Description: INTERVENTIONS:  -  Assess patient's ability to carry out ADLs; assess patient's baseline for ADL function and identify physical deficits which impact ability to perform ADLs (bathing, care of mouth/teeth, toileting, grooming, dressing, etc.)  - Assess/evaluate cause of self-care deficits   - Assess range of motion  - Assess patient's mobility; develop plan if impaired  - Assess patient's need for assistive devices and provide as appropriate  - Encourage maximum independence but intervene and supervise when necessary  - Involve family in performance of ADLs  - Assess for home care needs following discharge   - Consider OT consult to assist with ADL evaluation and planning for discharge  - Provide patient education as appropriate  Outcome:  Progressing  Goal: Maintains/Returns to pre admission functional level  Description: INTERVENTIONS:  - Perform AM-PAC 6 Click Basic Mobility/ Daily Activity assessment daily.  - Set and communicate daily mobility goal to care team and patient/family/caregiver.   - Collaborate with rehabilitation services on mobility goals if consulted  - Perform Range of Motion  times a day.  - Reposition patient every hours.  - Dangle patient  times a day  - Stand patient  times a day  - Ambulate patient  times a day  - Out of bed to chair  times a day   - Out of bed for meals times a day  - Out of bed for toileting  - Record patient progress and toleration of activity level   Outcome: Progressing     Problem: Nutrition/Hydration-ADULT  Goal: Nutrient/Hydration intake appropriate for improving, restoring or maintaining nutritional needs  Description: Monitor and assess patient's nutrition/hydration status for malnutrition. Collaborate with interdisciplinary team and initiate plan and interventions as ordered.  Monitor patient's weight and dietary intake as ordered or per policy. Utilize nutrition screening tool and intervene as necessary. Determine patient's food preferences and provide high-protein, high-caloric foods as appropriate.     INTERVENTIONS:  - Monitor oral intake, urinary output, labs, and treatment plans  - Assess nutrition and hydration status and recommend course of action  - Evaluate amount of meals eaten  - Assist patient with eating if necessary   - Allow adequate time for meals  - Recommend/ encourage appropriate diets, oral nutritional supplements, and vitamin/mineral supplements  - Order, calculate, and assess calorie counts as needed  - Recommend, monitor, and adjust tube feedings and TPN/PPN based on assessed needs  - Assess need for intravenous fluids  - Provide specific nutrition/hydration education as appropriate  - Include patient/family/caregiver in decisions related to nutrition  Outcome: Progressing

## 2024-03-05 NOTE — DISCHARGE SUMMARY
[facility]  Discharge- Lauren Quintero 1945, 78 y.o. female MRN: [id number]  Unit/Bed#: MS [facility] Encounter: [id number]  Primary Care Provider: Starla Bateman MD   Date and time admitted to hospital: 2/15/2024  5:13 PM    COPD with exacerbation (HCC)  Assessment & Plan  Completed treatment for COPD exacerbation with steroids  Now resolved    * Acute on chronic respiratory failure with hypoxia (HCC)  Assessment & Plan  Secondary to covid with superimposed pneumonia  Completed course of antibiotics and remdesivir   Now back to baseline oxygen requirements  She did report hemoptysis, but hemoglobin remained stable  Pulmonology was consulted and recommended conservative management  Thus stable for discharge     Elevated troponin  Assessment & Plan  Lab Results   Component Value Date    HSTNI0 2,584 (H) 02/17/2024    HSTNI2 2,049 (H) 02/17/2024    HSTNID2 -535 02/17/2024     Presented with elevated troponins, SOB, chest pain  ADRIEN Score: 5  Cardiac cath showed significant triple vessel disease  Cardiology recommends maximizing medical therapy for her CAD and follow-up outpatient with a CT surgeon for a potential CABG once she fully recovers from COVID. As per cardiac cath report could also be Takotsubo cardiomyopathy  Patient has ischemic cardiomyopathy with reduced EF of 35%, Lifevest has been fitted -telemetry monitoring as per protocol        CKD (chronic kidney disease)  Assessment & Plan  Lab Results   Component Value Date    EGFR 72 03/05/2024    EGFR 75 03/04/2024    EGFR 73 03/03/2024    CREATININE 0.79 03/05/2024    CREATININE 0.76 03/04/2024    CREATININE 0.78 03/03/2024     Pt has a history of CKD stage 2  Transitioned to po lasix 20 mg daily,   Cont to monitor bmp    Major depressive disorder, recurrent episode, moderate (HCC)  Assessment & Plan  Continue wellbutrin, vilazodone  Also takes Vraylar - patient brought this from home - continue    Essential hypertension  Assessment  & Plan  Continue home meds; initiate lisinopril as part of NSTEMI management  Continue carvedilol 25mg PO BID  Continue furosemide 20mg qD which is a chronic medicine  Continue to monitor blood pressure      COVID-19-resolved as of 3/5/2024  Assessment & Plan  Lab Results   Component Value Date    SARSCOV2 Positive (A) 02/15/2024    SARSCOV2 Negative 12/12/2023    SARSCOV2 Negative 10/28/2023       Symptoms of COVID-19 SYMPTOMS: shortness of breath and Tachycardia (more than 100 beats per min)  Vaccine status: vaccinated  The patient does have acute hypoxemic respiratory failure given the same    CT chest w contrast showing Scattered areas of tree-in-bud opacity consistent with endobronchial impaction/infection  S/p remdesevir, IV steroids  Finished baricitinib  No need for further isolation  Continue to monitor        Medical Problems       Resolved Problems  Date Reviewed: 2/26/2024            Resolved    Sepsis (HCC) 2/21/2024     Resolved by  Admin Ambulatory    COVID-19 3/5/2024     Resolved by  Lico Larson MD          Discharging Physician / Practitioner: Lico Larson MD  PCP: Starla Bateman MD  Admission Date:   Admission Orders (From admission, onward)       Ordered        02/15/24 1950  INPATIENT ADMISSION  Once                          Discharge Date: 03/05/24    Disposition:    Acute Rehab at [facility]    Discharge Diagnoses:   Please see assessment and plan section above for further details regarding discharge diagnoses.     Consultations During Hospital Stay:  IP CONSULT TO CARDIOLOGY  IP CONSULT TO PULMONOLOGY    Procedures Performed:   Cardiac catheterization    Significant Findings / Test Results:   XR chest 1 view portable    Result Date: 2/16/2024  Impression: Mild pulmonary interstitial edema. Workstation performed: [id number]     CT chest with contrast    Result Date: 2/15/2024  Impression: No evidence for pulmonary embolism. Mild pulmonary edema. Scattered areas of tree-in-bud  opacity consistent with endobronchial impaction/infection. Workstation performed: [id number]       XR chest portable    Result Date: 3/4/2024  Impression Stable appearance of consolidation in the right middle and lower lobes likely representing pneumonia. Workstation performed: [id number]        Incidental Findings:   None other than noted above. I reviewed the above mentioned incidental findings with the patient and/or family and they expressed understanding    Test Results Pending at Discharge (will require follow up):        Outpatient Tests Requested:  Repeat CXR in 6-8 weeks  Follow-up w/ CT surgeon consult for triple vessel disease    Complications:  Likely aspiration pneumonia superimposed on COVID pneumonia    Reason for Admission:   Chief Complaint   Patient presents with    Shortness of Breath     Patient's son was bring patient home from [facility], and patient became short of breath.  Patient reports sob over past week at least, with wet sounding cough.       Hospital Course:      Lauren Quintero is a 78 y.o. female patient who originally presented to the hospital on 2/15/2024 due to worsening SOB and increased oxygen demand. She was found to be positive for COVID in the ED and met sepsis criteria. CT imaging showed presence of tree-in-bud opacities representing infection with some mild pulmonary edema. She also had an elevated troponin, but no CP. She was started on dexamethasone, remdesevir, and baricitinib b/c of increase O2 requirements. Cardiology was consulted and got an echo, which showed new onset LV systolic dysfunction with LVEF: 35% and mild diastolic dysfunction. Pt was diagnosed with an NSTEMI and received a cardiac catheterization which showed triple vessel disease with 90% stenosed LAD, 85% mid cx, and 70% mid RCA. No interventions were performed and pt was recommended to follow-up with CT surgery outpatient for possible CABG. She was fitted for a lifevest given newly diagnosed  HFrEF. Pt had an extended stay as it took a while to recover from COVID lung insult as well as her history of underlying COPD. Pt initially was recovering and was able to stop remdesivir, baricitnib, and transition to steroid taper, but continued to complain of pleuritic chest pain, developed a productive cough, and had an increase in WBC to 20,000 and an elevated procal. Repeat CXR and then CT showed an opacity in the RLL explained either by pneumonia or mucus-plugging causing atelectasis. She was started on IV Ceftriaxone and pulmonary was consulted. She was encouraged to continue incentive spirometry, flutter valve therapy, and switched to IV Unasyn as it was suspected her pneumonia was due to aspiration. She got a swallow study, which showed mild oropharyngeal retention, but no obvious aspiration. Pt was put on softs and clinically has improved on the Unasyn and flutter valve. On day of discharge, she denies any fever, pleuritic chest pain and is back at baseline oxygen requirements of 4L. She still has a productive cough with some hemoptysis, but hemoglobin has been stable and hemoptysis and productive cough likely 2/2 to clearing of dead tissue from multiple lung infections. She is medically stable for discharge home    Condition at Discharge: fair           Discussion with Family:     Medication Adjustments and Discharge Medications:  Discharge Medication List: See after visit summary for reconciled discharge medications.   Medication Dosing Tapers - Please refer to Discharge Medication List for details on any medication dosing tapers (if applicable to patient).   Summary of Medication Adjustments made as a result of this hospitalization: as noted on med rec  Medications being temporarily held (include recommended restart time): as noted on med rec    Wound Care Recommendations:  When applicable, please see wound care section of After Visit Summary.    Instructions for any Catheters / Lines Present at  Discharge (including removal date, if applicable):     Diet Recommendations at Discharge:  Diet -        Diet Orders   (From admission, onward)                 Start     Ordered    03/04/24 1229  Dietary nutrition supplements  Once        Question Answer Comment   Select Supplement: Ensure Plus High Protein Chocolate    Frequency Dinner        03/04/24 1229    03/04/24 1229  Dietary nutrition supplements  Once        Question Answer Comment   Select Supplement: Ensure Plus High Protein Vanilla    Frequency Breakfast        03/04/24 1229    02/29/24 1232  Diet Dysphagia/Modified Consistency; Dysphagia 3-Dental Soft; Thin Liquid  Diet effective now        References:    Adult Nutrition Support Algorithm    RD Therapeutic Diet Order Protocol   Question Answer Comment   Diet Type Dysphagia/Modified Consistency    Dysphagia/Modified Consistency Dysphagia 3-Dental Soft    Liquid Modifier Thin Liquid    RD to adjust diet per protocol? Yes        02/29/24 1231                    Mobility at time of Discharge:   Basic Mobility Inpatient Raw Score: 14  -HLM Goal: 4: Move to chair/commode  JH-HLM Achieved: 1: Laying in bed  HLM Goal NOT achieved. Continue to encourage mobility in post discharge setting.    Goals of Care Discussions:  Code Status at Discharge: Level 3 - DNAR and DNI  Goals of care were discussed during this admission. Summary of discussion: discussed with patient goals of care and if she would like a palliative care consult given her triple vessel disease and COPD. She denied and said she would like us to continue all treatments.    Discharge instructions/Information to patient and family:   See after visit summary section titled Discharge Instructions for information provided to patient and family.      Planned Readmission: none      Discharge Statement:  The attending physician Lico Larosn MD spent 30+ minutes discharging the patient. This time was spent on the day of discharge. The attending  physician had direct contact with the patient on the day of discharge. Greater than 50% of the total time was spent examining patient, answering all patient questions, arranging and discussing plan of care with patient as well as directly providing post-discharge instructions.  Additional time then spent on discharge activities.    **Please Note: This note may have been constructed using a voice recognition system.**

## 2024-03-05 NOTE — PROGRESS NOTES
"Progress Note - Pulmonary   Lauren Quintero 78 y.o. female MRN: [id number]  Unit/Bed#: -01 Encounter: [id number]    Assessment:  Acute on chronic hypoxemic respiratory failure  COVID-19 pneumonia  Superimposed bacterial pneumonia with abnormal chest CT  COPD with acute exacerbation  Triple-vessel CAD    Plan:  Acute on chronic hypoxemic respiratory failure multifactorial in the setting of COVID-19 pneumonia, COPD exacerbation, aspiration pneumonia, triple-vessel CAD and NSTEMI  Remains on 3 L nasal cannula, recent baseline had been 4 L  She is completing antibiotics today  Repeat chest x-ray done yesterday showed stable consolidation in the right lower lobe, will need repeat imaging in 6 to 8 weeks  Continue airway clearance measures with flutter valve, I-S, Xopenex and Atrovent along with hypertonic saline  Patient about getting out of bed to the chair-she is not interested in moving from her current position, explained that this will help with her lung expansion and recovery, still wants to remain in bed  Continue Symbicort, completed course of steroids  She is planned for discharge to rehab, she is stable from a pulmonary standpoint  Will need repeat imaging in 6 to 8 weeks, can follow-up with us in the office    Subjective:   Patient resting in bed. She is still having some cough, pleuritic pain is better. She does not want to get out of bed, states \"it's all the same crap\".    Objective:     Vitals: Blood pressure 114/68, pulse 78, temperature (!) 97.4 °F (36.3 °C), temperature source Axillary, resp. rate 20, height 5' 8\" (1.727 m), weight 59.9 kg (132 lb), SpO2 96%, not currently breastfeeding.,Body mass index is 20.07 kg/m².      Intake/Output Summary (Last 24 hours) at 3/5/2024 1005  Last data filed at 3/5/2024 0559  Gross per 24 hour   Intake 300 ml   Output 600 ml   Net -300 ml       Invasive Devices       Peripheral Intravenous Line  Duration             Peripheral IV 03/03/24 Right;Ventral (anterior) " "Forearm 2 days              Drain  Duration             External Urinary Catheter 3 days                    Physical Exam: /68 (BP Location: Right arm)   Pulse 78   Temp (!) 97.4 °F (36.3 °C) (Axillary)   Resp 20   Ht 5' 8\" (1.727 m)   Wt 59.9 kg (132 lb)   SpO2 96%   BMI 20.07 kg/m²   General appearance: alert and oriented, in no acute distress  Head: Normocephalic, without obvious abnormality, atraumatic  Eyes: negative findings: conjunctivae and sclerae normal  Lungs:  upper airway rhonchi, diminished right base  Heart: regular rate and rhythm  Abdomen: normal findings: soft, non-tender  Extremities:  trace edema  Skin:  warm and dry  Neurologic: Mental status: Alert, oriented, thought content appropriate     Labs: I have personally reviewed pertinent lab results., CBC:   Lab Results   Component Value Date    WBC 11.40 (H) 03/05/2024    HGB 9.8 (L) 03/05/2024    HCT 30.7 (L) 03/05/2024    MCV 96 03/05/2024     03/05/2024    RBC 3.20 (L) 03/05/2024    MCH 30.6 03/05/2024    MCHC 31.9 03/05/2024    RDW 13.2 03/05/2024    MPV 10.6 03/05/2024    NRBC 0 03/05/2024   , CMP:   Lab Results   Component Value Date    SODIUM 141 03/05/2024    K 3.3 (L) 03/05/2024     03/05/2024    CO2 33 (H) 03/05/2024    BUN 20 03/05/2024    CREATININE 0.79 03/05/2024    CALCIUM 8.4 03/05/2024    EGFR 72 03/05/2024     Imaging and other studies: I have personally reviewed pertinent reports.   and I have personally reviewed pertinent films in PACS    "

## 2024-03-05 NOTE — CASE MANAGEMENT
Case Management Discharge Planning Note    Patient name Lauren Quintero  Location /-01 MRN [id number]  : 1945 Date 3/5/2024       Current Admission Date: 2/15/2024  Current Admission Diagnosis:Acute on chronic respiratory failure with hypoxia (HCC)   Patient Active Problem List    Diagnosis Date Noted    Elevated troponin 2024    CKD (chronic kidney disease) 2024    Dizziness 2023    Social problem 2023    Acute on chronic respiratory failure with hypoxia and hypercapnia (HCC) 2023    Diverticulitis 10/29/2023    Moderate protein-calorie malnutrition (HCC) 2023    BRBPR (bright red blood per rectum) 2023    Generalized weakness 2023    Staring episodes 2023    Waldenstrom macroglobulinemia (HCC) 2023    Severe protein-calorie malnutrition (HCC) 2021    Positive blood culture 2021    COPD with exacerbation (HCC) 2021    Anemia 2021    Abnormal computed tomography angiography (CTA) 2021    Fatigue 2021    Major depressive disorder, recurrent episode, moderate (HCC) 2019    Flank pain 2019    CAD (coronary artery disease), native coronary artery 2018    Acute on chronic respiratory failure with hypoxia (HCC) 2018    Ambulatory dysfunction 2018    Hypokalemia 2018    Essential hypertension 2018    Tobacco abuse 2018      LOS (days): 19  Geometric Mean LOS (GMLOS) (days): 5.1  Days to GMLOS:-13.7     OBJECTIVE:  Risk of Unplanned Readmission Score: 51.23      Current admission status: Inpatient   Preferred Pharmacy:   [id number] [place] - [address]  [address]  [place] [address]  Phone: [phone] Fax: [phone]    [facility]  [place] - [address]  [address]  [address]  [place] [address]  Phone: [phone] Fax: [phone]    Primary Care Provider: Starla Bateman MD  Primary  Insurance: MEDICARE  Secondary Insurance:     DISCHARGE DETAILS:    Discharge planning discussed with:: Patient via bedside x2.  Freedom of Choice: Yes  Comments - Freedom of Choice: FOC maintained - CM met with patient at bedside this morning to review DCP.  [facility]'s Patient Choice List given with additional available facilities for STR.  CM reviewed both [facility] and [facility], both of which have respiratory/cardiac programs.  Per SLIM, patient is medically cleared for d/c.  Patient does not feel medically ready and would like to appeal her d/c.  CM offered assistance with appeal and returned to room in afternoon for [facility] call and DCP review.  David Collins present at bedside and assisted with [facility] call.  CM contacted family/caregiver?: Yes (David Collins, present at bedside.)  Were Treatment Team discharge recommendations reviewed with patient/caregiver?: Yes (As it pertains to d/c planning & CM role to this point.)  Did patient/caregiver verbalize understanding of patient care needs?: N/A- going to facility  Were patient/caregiver advised of the risks associated with not following Treatment Team discharge recommendations?: Yes (As it pertains to d/c planning & CM role to this point.)    Contacts  Patient Contacts: David (son)  Relationship to Patient:: Family  Contact Method: In Person  Reason/Outcome: Continuity of Care, Discharge Planning    Requested Home Health Care         Is the patient interested in HHC at discharge?: No    DME Referral Provided  Referral made for DME?: No    Other Referral/Resources/Interventions Provided:  Interventions: Short Term Rehab, Other (Specify) ([facility] appeal)  Referral Comments: [facility]'s patient choice list given for review.  [facility] appeal called in by patient and family w/ CM at bedside.  Programs:: COPD    Would you like to participate in our [facility] service program?  : No - Declined    Treatment Team Recommendation: Short Term Rehab  Discharge  Destination Plan:: Short Term Rehab    IMM Given (Date):: 03/05/24  IMM Given to:: Patient  Family notified:: Son, David, at bedside.  Additional Comments: Verbal review of IMM with patient at bedside.  Patient confirmed understanding, questions answered.  Patient appeal called in w/ son at bedside.

## 2024-03-05 NOTE — ASSESSMENT & PLAN NOTE
Lab Results   Component Value Date    EGFR 72 03/05/2024    EGFR 75 03/04/2024    EGFR 73 03/03/2024    CREATININE 0.79 03/05/2024    CREATININE 0.76 03/04/2024    CREATININE 0.78 03/03/2024     Pt has a history of CKD stage 2  Transitioned to po lasix 20 mg daily,   Cont to monitor bmp

## 2024-03-05 NOTE — ASSESSMENT & PLAN NOTE
Secondary to covid with superimposed pneumonia  Completed course of antibiotics and remdesivir   Now back to baseline oxygen requirements  She did report hemoptysis, but hemoglobin remained stable  Pulmonology was consulted and recommended conservative management  Thus stable for discharge

## 2024-03-06 ENCOUNTER — PATIENT OUTREACH (OUTPATIENT)
Dept: CASE MANAGEMENT | Facility: OTHER | Age: 79
End: 2024-03-06

## 2024-03-06 PROCEDURE — 94640 AIRWAY INHALATION TREATMENT: CPT

## 2024-03-06 PROCEDURE — 94760 N-INVAS EAR/PLS OXIMETRY 1: CPT

## 2024-03-06 RX ORDER — GUAIFENESIN 600 MG/1
600 TABLET, EXTENDED RELEASE ORAL EVERY 12 HOURS SCHEDULED
Status: DISCONTINUED | OUTPATIENT
Start: 2024-03-06 | End: 2024-03-07 | Stop reason: HOSPADM

## 2024-03-06 RX ADMIN — PRAVASTATIN SODIUM 80 MG: 80 TABLET ORAL at 18:50

## 2024-03-06 RX ADMIN — SODIUM CHLORIDE SOLN NEBU 3% 4 ML: 3 NEBU SOLN at 13:09

## 2024-03-06 RX ADMIN — CARVEDILOL 25 MG: 12.5 TABLET, FILM COATED ORAL at 18:54

## 2024-03-06 RX ADMIN — LEVALBUTEROL HYDROCHLORIDE 1.25 MG: 1.25 SOLUTION RESPIRATORY (INHALATION) at 13:09

## 2024-03-06 RX ADMIN — MORPHINE SULFATE 2 MG: 2 INJECTION, SOLUTION INTRAMUSCULAR; INTRAVENOUS at 20:17

## 2024-03-06 RX ADMIN — ZANUBRUTINIB 160 MG: 80 CAPSULE, GELATIN COATED ORAL at 18:53

## 2024-03-06 RX ADMIN — Medication 3 MG: at 23:19

## 2024-03-06 RX ADMIN — LEVALBUTEROL HYDROCHLORIDE 1.25 MG: 1.25 SOLUTION RESPIRATORY (INHALATION) at 19:37

## 2024-03-06 RX ADMIN — IPRATROPIUM BROMIDE 0.5 MG: 0.5 SOLUTION RESPIRATORY (INHALATION) at 19:37

## 2024-03-06 RX ADMIN — HEPARIN SODIUM 5000 UNITS: 5000 INJECTION INTRAVENOUS; SUBCUTANEOUS at 05:22

## 2024-03-06 RX ADMIN — IPRATROPIUM BROMIDE 0.5 MG: 0.5 SOLUTION RESPIRATORY (INHALATION) at 13:09

## 2024-03-06 RX ADMIN — GUAIFENESIN 600 MG: 600 TABLET ORAL at 21:46

## 2024-03-06 RX ADMIN — SODIUM CHLORIDE SOLN NEBU 3% 4 ML: 3 NEBU SOLN at 19:37

## 2024-03-06 RX ADMIN — GUAIFENESIN AND DEXTROMETHORPHAN 10 ML: 100; 10 SYRUP ORAL at 23:19

## 2024-03-06 RX ADMIN — HEPARIN SODIUM 5000 UNITS: 5000 INJECTION INTRAVENOUS; SUBCUTANEOUS at 18:49

## 2024-03-06 RX ADMIN — LORAZEPAM 0.5 MG: 0.5 TABLET ORAL at 23:19

## 2024-03-06 NOTE — PLAN OF CARE
Problem: Potential for Falls  Goal: Patient will remain free of falls  Description: INTERVENTIONS:  - Educate patient/family on patient safety including physical limitations  - Instruct patient to call for assistance with activity   - Consult OT/PT to assist with strengthening/mobility   - Keep Call bell within reach  - Keep bed low and locked with side rails adjusted as appropriate  - Keep care items and personal belongings within reach  - Initiate and maintain comfort rounds  - Make Fall Risk Sign visible to staff  - Offer Toileting every  Hours, in advance of need  - Initiate/Maintain alarm  - Obtain necessary fall risk management equipment:   - Apply yellow socks and bracelet for high fall risk patients  - Consider moving patient to room near nurses station  Outcome: Progressing     Problem: RESPIRATORY - ADULT  Goal: Achieves optimal ventilation and oxygenation  Description: INTERVENTIONS:  - Assess for changes in respiratory status  - Assess for changes in mentation and behavior  - Position to facilitate oxygenation and minimize respiratory effort  - Oxygen administered by appropriate delivery if ordered  - Initiate smoking cessation education as indicated  - Encourage broncho-pulmonary hygiene including cough, deep breathe, Incentive Spirometry  - Assess the need for suctioning and aspirate as needed  - Assess and instruct to report SOB or any respiratory difficulty  - Respiratory Therapy support as indicated  Outcome: Progressing     Problem: Prexisting or High Potential for Compromised Skin Integrity  Goal: Skin integrity is maintained or improved  Description: INTERVENTIONS:  - Identify patients at risk for skin breakdown  - Assess and monitor skin integrity  - Assess and monitor nutrition and hydration status  - Monitor labs   - Assess for incontinence   - Turn and reposition patient  - Assist with mobility/ambulation  - Relieve pressure over bony prominences  - Avoid friction and shearing  - Provide  appropriate hygiene as needed including keeping skin clean and dry  - Evaluate need for skin moisturizer/barrier cream  - Collaborate with interdisciplinary team   - Patient/family teaching  - Consider wound care consult   Outcome: Progressing     Problem: PAIN - ADULT  Goal: Verbalizes/displays adequate comfort level or baseline comfort level  Description: Interventions:  - Encourage patient to monitor pain and request assistance  - Assess pain using appropriate pain scale  - Administer analgesics based on type and severity of pain and evaluate response  - Implement non-pharmacological measures as appropriate and evaluate response  - Consider cultural and social influences on pain and pain management  - Notify physician/advanced practitioner if interventions unsuccessful or patient reports new pain  Outcome: Progressing     Problem: INFECTION - ADULT  Goal: Absence or prevention of progression during hospitalization  Description: INTERVENTIONS:  - Assess and monitor for signs and symptoms of infection  - Monitor lab/diagnostic results  - Monitor all insertion sites, i.e. indwelling lines, tubes, and drains  - Monitor endotracheal if appropriate and nasal secretions for changes in amount and color  - [place] appropriate cooling/warming therapies per order  - Administer medications as ordered  - Instruct and encourage patient and family to use good hand hygiene technique  - Identify and instruct in appropriate isolation precautions for identified infection/condition  Outcome: Progressing  Goal: Absence of fever/infection during neutropenic period  Description: INTERVENTIONS:  - Monitor WBC    Outcome: Progressing     Problem: SAFETY ADULT  Goal: Patient will remain free of falls  Description: INTERVENTIONS:  - Educate patient/family on patient safety including physical limitations  - Instruct patient to call for assistance with activity   - Consult OT/PT to assist with strengthening/mobility   - Keep Call bell  within reach  - Keep bed low and locked with side rails adjusted as appropriate  - Keep care items and personal belongings within reach  - Initiate and maintain comfort rounds  - Make Fall Risk Sign visible to staff  - Offer Toileting every  Hours, in advance of need  - Initiate/Maintain alarm  - Obtain necessary fall risk management equipment:   - Apply yellow socks and bracelet for high fall risk patients  - Consider moving patient to room near nurses station  Outcome: Progressing  Goal: Maintain or return to baseline ADL function  Description: INTERVENTIONS:  -  Assess patient's ability to carry out ADLs; assess patient's baseline for ADL function and identify physical deficits which impact ability to perform ADLs (bathing, care of mouth/teeth, toileting, grooming, dressing, etc.)  - Assess/evaluate cause of self-care deficits   - Assess range of motion  - Assess patient's mobility; develop plan if impaired  - Assess patient's need for assistive devices and provide as appropriate  - Encourage maximum independence but intervene and supervise when necessary  - Involve family in performance of ADLs  - Assess for home care needs following discharge   - Consider OT consult to assist with ADL evaluation and planning for discharge  - Provide patient education as appropriate  Outcome: Progressing  Goal: Maintains/Returns to pre admission functional level  Description: INTERVENTIONS:  - Perform AM-PAC 6 Click Basic Mobility/ Daily Activity assessment daily.  - Set and communicate daily mobility goal to care team and patient/family/caregiver.   - Collaborate with rehabilitation services on mobility goals if consulted  - Perform Range of Motion  times a day.  - Reposition patient every  hours.  - Dangle patient  times a day  - Stand patient  times a day  - Ambulate patient  times a day  - Out of bed to chair  times a day   - Out of bed for meals  times a day  - Out of bed for toileting  - Record patient progress and  toleration of activity level   Outcome: Progressing     Problem: DISCHARGE PLANNING  Goal: Discharge to home or other facility with appropriate resources  Description: INTERVENTIONS:  - Identify barriers to discharge w/patient and caregiver  - Arrange for needed discharge resources and transportation as appropriate  - Identify discharge learning needs (meds, wound care, etc.)  - Arrange for interpretive services to assist at discharge as needed  - Refer to Case Management Department for coordinating discharge planning if the patient needs post-hospital services based on physician/advanced practitioner order or complex needs related to functional status, cognitive ability, or social support system  Outcome: Progressing     Problem: Knowledge Deficit  Goal: Patient/family/caregiver demonstrates understanding of disease process, treatment plan, medications, and discharge instructions  Description: Complete learning assessment and assess knowledge base.  Interventions:  - Provide teaching at level of understanding  - Provide teaching via preferred learning methods  Outcome: Progressing     Problem: MOBILITY - ADULT  Goal: Maintain or return to baseline ADL function  Description: INTERVENTIONS:  -  Assess patient's ability to carry out ADLs; assess patient's baseline for ADL function and identify physical deficits which impact ability to perform ADLs (bathing, care of mouth/teeth, toileting, grooming, dressing, etc.)  - Assess/evaluate cause of self-care deficits   - Assess range of motion  - Assess patient's mobility; develop plan if impaired  - Assess patient's need for assistive devices and provide as appropriate  - Encourage maximum independence but intervene and supervise when necessary  - Involve family in performance of ADLs  - Assess for home care needs following discharge   - Consider OT consult to assist with ADL evaluation and planning for discharge  - Provide patient education as appropriate  Outcome:  Progressing  Goal: Maintains/Returns to pre admission functional level  Description: INTERVENTIONS:  - Perform AM-PAC 6 Click Basic Mobility/ Daily Activity assessment daily.  - Set and communicate daily mobility goal to care team and patient/family/caregiver.   - Collaborate with rehabilitation services on mobility goals if consulted  - Perform Range of Motion  times a day.  - Reposition patient every hours.  - Dangle patient  times a day  - Stand patient  times a day  - Ambulate patient times a day  - Out of bed to chair  times a day   - Out of bed for meals  times a day  - Out of bed for toileting  - Record patient progress and toleration of activity level   Outcome: Progressing     Problem: Nutrition/Hydration-ADULT  Goal: Nutrient/Hydration intake appropriate for improving, restoring or maintaining nutritional needs  Description: Monitor and assess patient's nutrition/hydration status for malnutrition. Collaborate with interdisciplinary team and initiate plan and interventions as ordered.  Monitor patient's weight and dietary intake as ordered or per policy. Utilize nutrition screening tool and intervene as necessary. Determine patient's food preferences and provide high-protein, high-caloric foods as appropriate.     INTERVENTIONS:  - Monitor oral intake, urinary output, labs, and treatment plans  - Assess nutrition and hydration status and recommend course of action  - Evaluate amount of meals eaten  - Assist patient with eating if necessary   - Allow adequate time for meals  - Recommend/ encourage appropriate diets, oral nutritional supplements, and vitamin/mineral supplements  - Order, calculate, and assess calorie counts as needed  - Recommend, monitor, and adjust tube feedings and TPN/PPN based on assessed needs  - Assess need for intravenous fluids  - Provide specific nutrition/hydration education as appropriate  - Include patient/family/caregiver in decisions related to nutrition  Outcome: Progressing

## 2024-03-06 NOTE — QUICK NOTE
Patient is pending appeal of discharge. She was resting peacefully on my evaluation today.   Reports by nursing of being on 6 liters, but satting at 94% for a COPD patient. Labs were not collected today, no other changes clinically. Will recheck labs in AM. Continue breathing treatments.

## 2024-03-06 NOTE — RESPIRATORY THERAPY NOTE
Found patient sleeping with spo2 85% 3L, increased to 6L now spo2 92%, bbs rhonchi, refusing neb treatment, nursing aware

## 2024-03-06 NOTE — PROGRESS NOTES
"Patient irritable and refusing all medications. Patient encouraged and educated on the importance of these medications. Patient states to this nurse, \" Im tired.\" Also O2 increased this morning to 6L via NC by  Pam Larson aware.  "

## 2024-03-06 NOTE — PROGRESS NOTES
OP RT CM received incoming in basket reminder.  Patient hospitalized. I will outreach once discharged to home.

## 2024-03-06 NOTE — PLAN OF CARE
Problem: Potential for Falls  Goal: Patient will remain free of falls  Description: INTERVENTIONS:  - Educate patient/family on patient safety including physical limitations  - Instruct patient to call for assistance with activity   - Consult OT/PT to assist with strengthening/mobility   - Keep Call bell within reach  - Keep bed low and locked with side rails adjusted as appropriate  - Keep care items and personal belongings within reach  - Initiate and maintain comfort rounds  - Make Fall Risk Sign visible to staff  - Offer Toileting every 2 Hours, in advance of need  - Initiate/Maintain 2alarm  - Obtain necessary fall risk management equipment: 2  - Apply yellow socks and bracelet for high fall risk patients  - Consider moving patient to room near nurses station  Outcome: Progressing     Problem: RESPIRATORY - ADULT  Goal: Achieves optimal ventilation and oxygenation  Description: INTERVENTIONS:  - Assess for changes in respiratory status  - Assess for changes in mentation and behavior  - Position to facilitate oxygenation and minimize respiratory effort  - Oxygen administered by appropriate delivery if ordered  - Initiate smoking cessation education as indicated  - Encourage broncho-pulmonary hygiene including cough, deep breathe, Incentive Spirometry  - Assess the need for suctioning and aspirate as needed  - Assess and instruct to report SOB or any respiratory difficulty  - Respiratory Therapy support as indicated  Outcome: Progressing     Problem: Prexisting or High Potential for Compromised Skin Integrity  Goal: Skin integrity is maintained or improved  Description: INTERVENTIONS:  - Identify patients at risk for skin breakdown  - Assess and monitor skin integrity  - Assess and monitor nutrition and hydration status  - Monitor labs   - Assess for incontinence   - Turn and reposition patient  - Assist with mobility/ambulation  - Relieve pressure over bony prominences  - Avoid friction and shearing  - Provide  appropriate hygiene as needed including keeping skin clean and dry  - Evaluate need for skin moisturizer/barrier cream  - Collaborate with interdisciplinary team   - Patient/family teaching  - Consider wound care consult   Outcome: Progressing     Problem: PAIN - ADULT  Goal: Verbalizes/displays adequate comfort level or baseline comfort level  Description: Interventions:  - Encourage patient to monitor pain and request assistance  - Assess pain using appropriate pain scale  - Administer analgesics based on type and severity of pain and evaluate response  - Implement non-pharmacological measures as appropriate and evaluate response  - Consider cultural and social influences on pain and pain management  - Notify physician/advanced practitioner if interventions unsuccessful or patient reports new pain  Outcome: Progressing     Problem: INFECTION - ADULT  Goal: Absence or prevention of progression during hospitalization  Description: INTERVENTIONS:  - Assess and monitor for signs and symptoms of infection  - Monitor lab/diagnostic results  - Monitor all insertion sites, i.e. indwelling lines, tubes, and drains  - Monitor endotracheal if appropriate and nasal secretions for changes in amount and color  - [place] appropriate cooling/warming therapies per order  - Administer medications as ordered  - Instruct and encourage patient and family to use good hand hygiene technique  - Identify and instruct in appropriate isolation precautions for identified infection/condition  Outcome: Progressing  Goal: Absence of fever/infection during neutropenic period  Description: INTERVENTIONS:  - Monitor WBC    Outcome: Progressing     Problem: SAFETY ADULT  Goal: Patient will remain free of falls  Description: INTERVENTIONS:  - Educate patient/family on patient safety including physical limitations  - Instruct patient to call for assistance with activity   - Consult OT/PT to assist with strengthening/mobility   - Keep Call bell  within reach  - Keep bed low and locked with side rails adjusted as appropriate  - Keep care items and personal belongings within reach  - Initiate and maintain comfort rounds  - Make Fall Risk Sign visible to staff  - Offer Toileting every 2 Hours, in advance of need  - Initiate/Maintain 2alarm  - Obtain necessary fall risk management equipment: 2  - Apply yellow socks and bracelet for high fall risk patients  - Consider moving patient to room near nurses station  Outcome: Progressing  Goal: Maintain or return to baseline ADL function  Description: INTERVENTIONS:  -  Assess patient's ability to carry out ADLs; assess patient's baseline for ADL function and identify physical deficits which impact ability to perform ADLs (bathing, care of mouth/teeth, toileting, grooming, dressing, etc.)  - Assess/evaluate cause of self-care deficits   - Assess range of motion  - Assess patient's mobility; develop plan if impaired  - Assess patient's need for assistive devices and provide as appropriate  - Encourage maximum independence but intervene and supervise when necessary  - Involve family in performance of ADLs  - Assess for home care needs following discharge   - Consider OT consult to assist with ADL evaluation and planning for discharge  - Provide patient education as appropriate  Outcome: Progressing  Goal: Maintains/Returns to pre admission functional level  Description: INTERVENTIONS:  - Perform AM-PAC 6 Click Basic Mobility/ Daily Activity assessment daily.  - Set and communicate daily mobility goal to care team and patient/family/caregiver.   - Collaborate with rehabilitation services on mobility goals if consulted  - Perform Range of Motion 2 times a day.  - Reposition patient every 2 hours.  - Dangle patient 2 times a day  - Stand patient 2 times a day  - Ambulate patient 2 times a day  - Out of bed to chair 2 times a day   - Out of bed for meals 2 times a day  - Out of bed for toileting  - Record patient progress  and toleration of activity level   Outcome: Progressing     Problem: DISCHARGE PLANNING  Goal: Discharge to home or other facility with appropriate resources  Description: INTERVENTIONS:  - Identify barriers to discharge w/patient and caregiver  - Arrange for needed discharge resources and transportation as appropriate  - Identify discharge learning needs (meds, wound care, etc.)  - Arrange for interpretive services to assist at discharge as needed  - Refer to Case Management Department for coordinating discharge planning if the patient needs post-hospital services based on physician/advanced practitioner order or complex needs related to functional status, cognitive ability, or social support system  Outcome: Progressing     Problem: Knowledge Deficit  Goal: Patient/family/caregiver demonstrates understanding of disease process, treatment plan, medications, and discharge instructions  Description: Complete learning assessment and assess knowledge base.  Interventions:  - Provide teaching at level of understanding  - Provide teaching via preferred learning methods  Outcome: Progressing     Problem: MOBILITY - ADULT  Goal: Maintain or return to baseline ADL function  Description: INTERVENTIONS:  -  Assess patient's ability to carry out ADLs; assess patient's baseline for ADL function and identify physical deficits which impact ability to perform ADLs (bathing, care of mouth/teeth, toileting, grooming, dressing, etc.)  - Assess/evaluate cause of self-care deficits   - Assess range of motion  - Assess patient's mobility; develop plan if impaired  - Assess patient's need for assistive devices and provide as appropriate  - Encourage maximum independence but intervene and supervise when necessary  - Involve family in performance of ADLs  - Assess for home care needs following discharge   - Consider OT consult to assist with ADL evaluation and planning for discharge  - Provide patient education as appropriate  Outcome:  Progressing  Goal: Maintains/Returns to pre admission functional level  Description: INTERVENTIONS:  - Perform AM-PAC 6 Click Basic Mobility/ Daily Activity assessment daily.  - Set and communicate daily mobility goal to care team and patient/family/caregiver.   - Collaborate with rehabilitation services on mobility goals if consulted  - Perform Range of Motion 2 times a day.  - Reposition patient every 2 hours.  - Dangle patient 2 times a day  - Stand patient 2 times a day  - Ambulate patient 2 times a day  - Out of bed to chair 2 times a day   - Out of bed for meals 2 times a day  - Out of bed for toileting  - Record patient progress and toleration of activity level   Outcome: Progressing     Problem: Nutrition/Hydration-ADULT  Goal: Nutrient/Hydration intake appropriate for improving, restoring or maintaining nutritional needs  Description: Monitor and assess patient's nutrition/hydration status for malnutrition. Collaborate with interdisciplinary team and initiate plan and interventions as ordered.  Monitor patient's weight and dietary intake as ordered or per policy. Utilize nutrition screening tool and intervene as necessary. Determine patient's food preferences and provide high-protein, high-caloric foods as appropriate.     INTERVENTIONS:  - Monitor oral intake, urinary output, labs, and treatment plans  - Assess nutrition and hydration status and recommend course of action  - Evaluate amount of meals eaten  - Assist patient with eating if necessary   - Allow adequate time for meals  - Recommend/ encourage appropriate diets, oral nutritional supplements, and vitamin/mineral supplements  - Order, calculate, and assess calorie counts as needed  - Recommend, monitor, and adjust tube feedings and TPN/PPN based on assessed needs  - Assess need for intravenous fluids  - Provide specific nutrition/hydration education as appropriate  - Include patient/family/caregiver in decisions related to nutrition  Outcome:  Progressing     Problem: CARDIOVASCULAR - ADULT  Goal: Maintains optimal cardiac output and hemodynamic stability  Description: INTERVENTIONS:  - Monitor I/O, vital signs and rhythm  - Monitor for S/S and trends of decreased cardiac output  - Administer and titrate ordered vasoactive medications to optimize hemodynamic stability  - Assess quality of pulses, skin color and temperature  - Assess for signs of decreased coronary artery perfusion  - Instruct patient to report change in severity of symptoms  Outcome: Progressing  Goal: Absence of cardiac dysrhythmias or at baseline rhythm  Description: INTERVENTIONS:  - Continuous cardiac monitoring, vital signs, obtain 12 lead EKG if ordered  - Administer antiarrhythmic and heart rate control medications as ordered  - Monitor electrolytes and administer replacement therapy as ordered  Outcome: Progressing

## 2024-03-07 ENCOUNTER — TELEPHONE (OUTPATIENT)
Dept: OTHER | Facility: OTHER | Age: 79
End: 2024-03-07

## 2024-03-07 VITALS
DIASTOLIC BLOOD PRESSURE: 57 MMHG | HEIGHT: 68 IN | OXYGEN SATURATION: 95 % | RESPIRATION RATE: 18 BRPM | WEIGHT: 132 LBS | BODY MASS INDEX: 20 KG/M2 | SYSTOLIC BLOOD PRESSURE: 114 MMHG | HEART RATE: 105 BPM | TEMPERATURE: 97.7 F

## 2024-03-07 DIAGNOSIS — R07.81 PLEURITIC CHEST PAIN: Primary | ICD-10-CM

## 2024-03-07 LAB
ANION GAP SERPL CALCULATED.3IONS-SCNC: 5 MMOL/L
BASOPHILS # BLD AUTO: 0.02 THOUSANDS/ÂΜL (ref 0–0.1)
BASOPHILS NFR BLD AUTO: 0 % (ref 0–1)
BUN SERPL-MCNC: 24 MG/DL (ref 5–25)
CALCIUM SERPL-MCNC: 8.7 MG/DL (ref 8.4–10.2)
CHLORIDE SERPL-SCNC: 101 MMOL/L (ref 96–108)
CO2 SERPL-SCNC: 34 MMOL/L (ref 21–32)
CREAT SERPL-MCNC: 0.77 MG/DL (ref 0.6–1.3)
EOSINOPHIL # BLD AUTO: 0.17 THOUSAND/ÂΜL (ref 0–0.61)
EOSINOPHIL NFR BLD AUTO: 2 % (ref 0–6)
ERYTHROCYTE [DISTWIDTH] IN BLOOD BY AUTOMATED COUNT: 13.3 % (ref 11.6–15.1)
GFR SERPL CREATININE-BSD FRML MDRD: 74 ML/MIN/1.73SQ M
GLUCOSE SERPL-MCNC: 106 MG/DL (ref 65–140)
HCT VFR BLD AUTO: 30.6 % (ref 34.8–46.1)
HGB BLD-MCNC: 9.7 G/DL (ref 11.5–15.4)
IMM GRANULOCYTES # BLD AUTO: 0.18 THOUSAND/UL (ref 0–0.2)
IMM GRANULOCYTES NFR BLD AUTO: 2 % (ref 0–2)
LYMPHOCYTES # BLD AUTO: 2.03 THOUSANDS/ÂΜL (ref 0.6–4.47)
LYMPHOCYTES NFR BLD AUTO: 22 % (ref 14–44)
MAGNESIUM SERPL-MCNC: 1.8 MG/DL (ref 1.9–2.7)
MCH RBC QN AUTO: 30.3 PG (ref 26.8–34.3)
MCHC RBC AUTO-ENTMCNC: 31.7 G/DL (ref 31.4–37.4)
MCV RBC AUTO: 96 FL (ref 82–98)
MONOCYTES # BLD AUTO: 0.62 THOUSAND/ÂΜL (ref 0.17–1.22)
MONOCYTES NFR BLD AUTO: 7 % (ref 4–12)
NEUTROPHILS # BLD AUTO: 6.44 THOUSANDS/ÂΜL (ref 1.85–7.62)
NEUTS SEG NFR BLD AUTO: 67 % (ref 43–75)
NRBC BLD AUTO-RTO: 0 /100 WBCS
PLATELET # BLD AUTO: 185 THOUSANDS/UL (ref 149–390)
PMV BLD AUTO: 10.9 FL (ref 8.9–12.7)
POTASSIUM SERPL-SCNC: 4 MMOL/L (ref 3.5–5.3)
RBC # BLD AUTO: 3.2 MILLION/UL (ref 3.81–5.12)
SODIUM SERPL-SCNC: 140 MMOL/L (ref 135–147)
WBC # BLD AUTO: 9.46 THOUSAND/UL (ref 4.31–10.16)

## 2024-03-07 PROCEDURE — 94760 N-INVAS EAR/PLS OXIMETRY 1: CPT

## 2024-03-07 PROCEDURE — 85025 COMPLETE CBC W/AUTO DIFF WBC: CPT | Performed by: STUDENT IN AN ORGANIZED HEALTH CARE EDUCATION/TRAINING PROGRAM

## 2024-03-07 PROCEDURE — 83735 ASSAY OF MAGNESIUM: CPT | Performed by: STUDENT IN AN ORGANIZED HEALTH CARE EDUCATION/TRAINING PROGRAM

## 2024-03-07 PROCEDURE — 80048 BASIC METABOLIC PNL TOTAL CA: CPT | Performed by: STUDENT IN AN ORGANIZED HEALTH CARE EDUCATION/TRAINING PROGRAM

## 2024-03-07 PROCEDURE — 94640 AIRWAY INHALATION TREATMENT: CPT

## 2024-03-07 RX ORDER — HYDROCODONE BITARTRATE AND ACETAMINOPHEN 5; 325 MG/1; MG/1
1 TABLET ORAL EVERY 6 HOURS PRN
Qty: 45 TABLET | Refills: 0 | Status: SHIPPED | OUTPATIENT
Start: 2024-03-07 | End: 2024-03-21

## 2024-03-07 RX ADMIN — PANTOPRAZOLE SODIUM 40 MG: 40 TABLET, DELAYED RELEASE ORAL at 10:23

## 2024-03-07 RX ADMIN — IPRATROPIUM BROMIDE 0.5 MG: 0.5 SOLUTION RESPIRATORY (INHALATION) at 13:25

## 2024-03-07 RX ADMIN — DOCUSATE SODIUM 100 MG: 100 CAPSULE, LIQUID FILLED ORAL at 10:21

## 2024-03-07 RX ADMIN — MIDODRINE HYDROCHLORIDE 2.5 MG: 5 TABLET ORAL at 16:45

## 2024-03-07 RX ADMIN — HEPARIN SODIUM 5000 UNITS: 5000 INJECTION INTRAVENOUS; SUBCUTANEOUS at 03:45

## 2024-03-07 RX ADMIN — SODIUM CHLORIDE SOLN NEBU 3% 4 ML: 3 NEBU SOLN at 07:27

## 2024-03-07 RX ADMIN — VILAZODONE HYDROCHLORIDE 40 MG: 20 TABLET ORAL at 10:23

## 2024-03-07 RX ADMIN — ASPIRIN 81 MG: 81 TABLET, COATED ORAL at 10:19

## 2024-03-07 RX ADMIN — NYSTATIN 500000 UNITS: 100000 SUSPENSION ORAL at 17:42

## 2024-03-07 RX ADMIN — LEVALBUTEROL HYDROCHLORIDE 1.25 MG: 1.25 SOLUTION RESPIRATORY (INHALATION) at 07:27

## 2024-03-07 RX ADMIN — ALBUTEROL SULFATE 2.5 MG: 2.5 SOLUTION RESPIRATORY (INHALATION) at 18:09

## 2024-03-07 RX ADMIN — PRAVASTATIN SODIUM 80 MG: 80 TABLET ORAL at 17:42

## 2024-03-07 RX ADMIN — SODIUM CHLORIDE SOLN NEBU 3% 4 ML: 3 NEBU SOLN at 13:25

## 2024-03-07 RX ADMIN — ZANUBRUTINIB 160 MG: 80 CAPSULE, GELATIN COATED ORAL at 10:23

## 2024-03-07 RX ADMIN — LIDOCAINE 5% 2 PATCH: 700 PATCH TOPICAL at 14:12

## 2024-03-07 RX ADMIN — BUPROPION HYDROCHLORIDE 75 MG: 75 TABLET, FILM COATED ORAL at 10:20

## 2024-03-07 RX ADMIN — DOCUSATE SODIUM 100 MG: 100 CAPSULE, LIQUID FILLED ORAL at 17:42

## 2024-03-07 RX ADMIN — LEVALBUTEROL HYDROCHLORIDE 1.25 MG: 1.25 SOLUTION RESPIRATORY (INHALATION) at 13:25

## 2024-03-07 RX ADMIN — IPRATROPIUM BROMIDE 0.5 MG: 0.5 SOLUTION RESPIRATORY (INHALATION) at 07:27

## 2024-03-07 RX ADMIN — GUAIFENESIN 600 MG: 600 TABLET ORAL at 10:21

## 2024-03-07 RX ADMIN — CARVEDILOL 25 MG: 12.5 TABLET, FILM COATED ORAL at 16:46

## 2024-03-07 RX ADMIN — MIDODRINE HYDROCHLORIDE 2.5 MG: 5 TABLET ORAL at 11:23

## 2024-03-07 NOTE — NURSING NOTE
Patient medications from home which had been held by pharmacy, returned to patient and transport team to take to facility with them. Discharge instructions given, pt. Had life vest on, battery checked and charged. All belongings with patient.

## 2024-03-07 NOTE — PLAN OF CARE
Problem: Potential for Falls  Goal: Patient will remain free of falls  Description: INTERVENTIONS:  - Educate patient/family on patient safety including physical limitations  - Instruct patient to call for assistance with activity   - Consult OT/PT to assist with strengthening/mobility   - Keep Call bell within reach  - Keep bed low and locked with side rails adjusted as appropriate  - Keep care items and personal belongings within reach  - Initiate and maintain comfort rounds  - Make Fall Risk Sign visible to staff  - Offer Toileting every 2 Hours, in advance of need  - Initiate/Maintain 2alarm  - Obtain necessary fall risk management equipment: 2  - Apply yellow socks and bracelet for high fall risk patients  - Consider moving patient to room near nurses station  Outcome: Progressing     Problem: RESPIRATORY - ADULT  Goal: Achieves optimal ventilation and oxygenation  Description: INTERVENTIONS:  - Assess for changes in respiratory status  - Assess for changes in mentation and behavior  - Position to facilitate oxygenation and minimize respiratory effort  - Oxygen administered by appropriate delivery if ordered  - Initiate smoking cessation education as indicated  - Encourage broncho-pulmonary hygiene including cough, deep breathe, Incentive Spirometry  - Assess the need for suctioning and aspirate as needed  - Assess and instruct to report SOB or any respiratory difficulty  - Respiratory Therapy support as indicated  Outcome: Progressing     Problem: Prexisting or High Potential for Compromised Skin Integrity  Goal: Skin integrity is maintained or improved  Description: INTERVENTIONS:  - Identify patients at risk for skin breakdown  - Assess and monitor skin integrity  - Assess and monitor nutrition and hydration status  - Monitor labs   - Assess for incontinence   - Turn and reposition patient  - Assist with mobility/ambulation  - Relieve pressure over bony prominences  - Avoid friction and shearing  - Provide  appropriate hygiene as needed including keeping skin clean and dry  - Evaluate need for skin moisturizer/barrier cream  - Collaborate with interdisciplinary team   - Patient/family teaching  - Consider wound care consult   Outcome: Progressing     Problem: PAIN - ADULT  Goal: Verbalizes/displays adequate comfort level or baseline comfort level  Description: Interventions:  - Encourage patient to monitor pain and request assistance  - Assess pain using appropriate pain scale  - Administer analgesics based on type and severity of pain and evaluate response  - Implement non-pharmacological measures as appropriate and evaluate response  - Consider cultural and social influences on pain and pain management  - Notify physician/advanced practitioner if interventions unsuccessful or patient reports new pain  Outcome: Progressing     Problem: INFECTION - ADULT  Goal: Absence or prevention of progression during hospitalization  Description: INTERVENTIONS:  - Assess and monitor for signs and symptoms of infection  - Monitor lab/diagnostic results  - Monitor all insertion sites, i.e. indwelling lines, tubes, and drains  - Monitor endotracheal if appropriate and nasal secretions for changes in amount and color  - [place] appropriate cooling/warming therapies per order  - Administer medications as ordered  - Instruct and encourage patient and family to use good hand hygiene technique  - Identify and instruct in appropriate isolation precautions for identified infection/condition  Outcome: Progressing  Goal: Absence of fever/infection during neutropenic period  Description: INTERVENTIONS:  - Monitor WBC    Outcome: Progressing     Problem: SAFETY ADULT  Goal: Patient will remain free of falls  Description: INTERVENTIONS:  - Educate patient/family on patient safety including physical limitations  - Instruct patient to call for assistance with activity   - Consult OT/PT to assist with strengthening/mobility   - Keep Call bell  within reach  - Keep bed low and locked with side rails adjusted as appropriate  - Keep care items and personal belongings within reach  - Initiate and maintain comfort rounds  - Make Fall Risk Sign visible to staff  - Offer Toileting every 2 Hours, in advance of need  - Initiate/Maintain 2alarm  - Obtain necessary fall risk management equipment: 2  - Apply yellow socks and bracelet for high fall risk patients  - Consider moving patient to room near nurses station  Outcome: Progressing  Goal: Maintain or return to baseline ADL function  Description: INTERVENTIONS:  -  Assess patient's ability to carry out ADLs; assess patient's baseline for ADL function and identify physical deficits which impact ability to perform ADLs (bathing, care of mouth/teeth, toileting, grooming, dressing, etc.)  - Assess/evaluate cause of self-care deficits   - Assess range of motion  - Assess patient's mobility; develop plan if impaired  - Assess patient's need for assistive devices and provide as appropriate  - Encourage maximum independence but intervene and supervise when necessary  - Involve family in performance of ADLs  - Assess for home care needs following discharge   - Consider OT consult to assist with ADL evaluation and planning for discharge  - Provide patient education as appropriate  Outcome: Progressing  Goal: Maintains/Returns to pre admission functional level  Description: INTERVENTIONS:  - Perform AM-PAC 6 Click Basic Mobility/ Daily Activity assessment daily.  - Set and communicate daily mobility goal to care team and patient/family/caregiver.   - Collaborate with rehabilitation services on mobility goals if consulted  - Perform Range of Motion 2 times a day.  - Reposition patient every 2 hours.  - Dangle patient 2 times a day  - Stand patient 2 times a day  - Ambulate patient 2 times a day  - Out of bed to chair 2 times a day   - Out of bed for meals 2 times a day  - Out of bed for toileting  - Record patient progress  and toleration of activity level   Outcome: Progressing     Problem: DISCHARGE PLANNING  Goal: Discharge to home or other facility with appropriate resources  Description: INTERVENTIONS:  - Identify barriers to discharge w/patient and caregiver  - Arrange for needed discharge resources and transportation as appropriate  - Identify discharge learning needs (meds, wound care, etc.)  - Arrange for interpretive services to assist at discharge as needed  - Refer to Case Management Department for coordinating discharge planning if the patient needs post-hospital services based on physician/advanced practitioner order or complex needs related to functional status, cognitive ability, or social support system  Outcome: Progressing     Problem: Knowledge Deficit  Goal: Patient/family/caregiver demonstrates understanding of disease process, treatment plan, medications, and discharge instructions  Description: Complete learning assessment and assess knowledge base.  Interventions:  - Provide teaching at level of understanding  - Provide teaching via preferred learning methods  Outcome: Progressing     Problem: MOBILITY - ADULT  Goal: Maintain or return to baseline ADL function  Description: INTERVENTIONS:  -  Assess patient's ability to carry out ADLs; assess patient's baseline for ADL function and identify physical deficits which impact ability to perform ADLs (bathing, care of mouth/teeth, toileting, grooming, dressing, etc.)  - Assess/evaluate cause of self-care deficits   - Assess range of motion  - Assess patient's mobility; develop plan if impaired  - Assess patient's need for assistive devices and provide as appropriate  - Encourage maximum independence but intervene and supervise when necessary  - Involve family in performance of ADLs  - Assess for home care needs following discharge   - Consider OT consult to assist with ADL evaluation and planning for discharge  - Provide patient education as appropriate  Outcome:  Progressing  Goal: Maintains/Returns to pre admission functional level  Description: INTERVENTIONS:  - Perform AM-PAC 6 Click Basic Mobility/ Daily Activity assessment daily.  - Set and communicate daily mobility goal to care team and patient/family/caregiver.   - Collaborate with rehabilitation services on mobility goals if consulted  - Perform Range of Motion 2 times a day.  - Reposition patient every 2 hours.  - Dangle patient 2 times a day  - Stand patient 2 times a day  - Ambulate patient 2 times a day  - Out of bed to chair 2 times a day   - Out of bed for meals 2 times a day  - Out of bed for toileting  - Record patient progress and toleration of activity level   Outcome: Progressing     Problem: Nutrition/Hydration-ADULT  Goal: Nutrient/Hydration intake appropriate for improving, restoring or maintaining nutritional needs  Description: Monitor and assess patient's nutrition/hydration status for malnutrition. Collaborate with interdisciplinary team and initiate plan and interventions as ordered.  Monitor patient's weight and dietary intake as ordered or per policy. Utilize nutrition screening tool and intervene as necessary. Determine patient's food preferences and provide high-protein, high-caloric foods as appropriate.     INTERVENTIONS:  - Monitor oral intake, urinary output, labs, and treatment plans  - Assess nutrition and hydration status and recommend course of action  - Evaluate amount of meals eaten  - Assist patient with eating if necessary   - Allow adequate time for meals  - Recommend/ encourage appropriate diets, oral nutritional supplements, and vitamin/mineral supplements  - Order, calculate, and assess calorie counts as needed  - Recommend, monitor, and adjust tube feedings and TPN/PPN based on assessed needs  - Assess need for intravenous fluids  - Provide specific nutrition/hydration education as appropriate  - Include patient/family/caregiver in decisions related to nutrition  Outcome:  Progressing  Problem: Potential for Falls  Goal: Patient will remain free of falls  Description: INTERVENTIONS:  - Educate patient/family on patient safety including physical limitations  - Instruct patient to call for assistance with activity   - Consult OT/PT to assist with strengthening/mobility   - Keep Call bell within reach  - Keep bed low and locked with side rails adjusted as appropriate  - Keep care items and personal belongings within reach  - Initiate and maintain comfort rounds  - Make Fall Risk Sign visible to staff  - Offer Toileting every 2 Hours, in advance of need  - Initiate/Maintain 2alarm  - Obtain necessary fall risk management equipment: 2  - Apply yellow socks and bracelet for high fall risk patients  - Consider moving patient to room near nurses station  Outcome: Progressing     Problem: RESPIRATORY - ADULT  Goal: Achieves optimal ventilation and oxygenation  Description: INTERVENTIONS:  - Assess for changes in respiratory status  - Assess for changes in mentation and behavior  - Position to facilitate oxygenation and minimize respiratory effort  - Oxygen administered by appropriate delivery if ordered  - Initiate smoking cessation education as indicated  - Encourage broncho-pulmonary hygiene including cough, deep breathe, Incentive Spirometry  - Assess the need for suctioning and aspirate as needed  - Assess and instruct to report SOB or any respiratory difficulty  - Respiratory Therapy support as indicated  Outcome: Progressing     Problem: Prexisting or High Potential for Compromised Skin Integrity  Goal: Skin integrity is maintained or improved  Description: INTERVENTIONS:  - Identify patients at risk for skin breakdown  - Assess and monitor skin integrity  - Assess and monitor nutrition and hydration status  - Monitor labs   - Assess for incontinence   - Turn and reposition patient  - Assist with mobility/ambulation  - Relieve pressure over bony prominences  - Avoid friction and  shearing  - Provide appropriate hygiene as needed including keeping skin clean and dry  - Evaluate need for skin moisturizer/barrier cream  - Collaborate with interdisciplinary team   - Patient/family teaching  - Consider wound care consult   Outcome: Progressing     Problem: PAIN - ADULT  Goal: Verbalizes/displays adequate comfort level or baseline comfort level  Description: Interventions:  - Encourage patient to monitor pain and request assistance  - Assess pain using appropriate pain scale  - Administer analgesics based on type and severity of pain and evaluate response  - Implement non-pharmacological measures as appropriate and evaluate response  - Consider cultural and social influences on pain and pain management  - Notify physician/advanced practitioner if interventions unsuccessful or patient reports new pain  Outcome: Progressing     Problem: INFECTION - ADULT  Goal: Absence or prevention of progression during hospitalization  Description: INTERVENTIONS:  - Assess and monitor for signs and symptoms of infection  - Monitor lab/diagnostic results  - Monitor all insertion sites, i.e. indwelling lines, tubes, and drains  - Monitor endotracheal if appropriate and nasal secretions for changes in amount and color  - [place] appropriate cooling/warming therapies per order  - Administer medications as ordered  - Instruct and encourage patient and family to use good hand hygiene technique  - Identify and instruct in appropriate isolation precautions for identified infection/condition  Outcome: Progressing  Goal: Absence of fever/infection during neutropenic period  Description: INTERVENTIONS:  - Monitor WBC    Outcome: Progressing     Problem: SAFETY ADULT  Goal: Patient will remain free of falls  Description: INTERVENTIONS:  - Educate patient/family on patient safety including physical limitations  - Instruct patient to call for assistance with activity   - Consult OT/PT to assist with strengthening/mobility   -  Keep Call bell within reach  - Keep bed low and locked with side rails adjusted as appropriate  - Keep care items and personal belongings within reach  - Initiate and maintain comfort rounds  - Make Fall Risk Sign visible to staff  - Offer Toileting every 2 Hours, in advance of need  - Initiate/Maintain 2alarm  - Obtain necessary fall risk management equipment: 2  - Apply yellow socks and bracelet for high fall risk patients  - Consider moving patient to room near nurses station  Outcome: Progressing  Goal: Maintain or return to baseline ADL function  Description: INTERVENTIONS:  -  Assess patient's ability to carry out ADLs; assess patient's baseline for ADL function and identify physical deficits which impact ability to perform ADLs (bathing, care of mouth/teeth, toileting, grooming, dressing, etc.)  - Assess/evaluate cause of self-care deficits   - Assess range of motion  - Assess patient's mobility; develop plan if impaired  - Assess patient's need for assistive devices and provide as appropriate  - Encourage maximum independence but intervene and supervise when necessary  - Involve family in performance of ADLs  - Assess for home care needs following discharge   - Consider OT consult to assist with ADL evaluation and planning for discharge  - Provide patient education as appropriate  Outcome: Progressing  Goal: Maintains/Returns to pre admission functional level  Description: INTERVENTIONS:  - Perform AM-PAC 6 Click Basic Mobility/ Daily Activity assessment daily.  - Set and communicate daily mobility goal to care team and patient/family/caregiver.   - Collaborate with rehabilitation services on mobility goals if consulted  - Perform Range of Motion 2 times a day.  - Reposition patient every 2 hours.  - Dangle patient 2 times a day  - Stand patient 2 times a day  - Ambulate patient 2 times a day  - Out of bed to chair 2 times a day   - Out of bed for meals 2 times a day  - Out of bed for toileting  - Record  patient progress and toleration of activity level   Outcome: Progressing     Problem: DISCHARGE PLANNING  Goal: Discharge to home or other facility with appropriate resources  Description: INTERVENTIONS:  - Identify barriers to discharge w/patient and caregiver  - Arrange for needed discharge resources and transportation as appropriate  - Identify discharge learning needs (meds, wound care, etc.)  - Arrange for interpretive services to assist at discharge as needed  - Refer to Case Management Department for coordinating discharge planning if the patient needs post-hospital services based on physician/advanced practitioner order or complex needs related to functional status, cognitive ability, or social support system  Outcome: Progressing     Problem: Knowledge Deficit  Goal: Patient/family/caregiver demonstrates understanding of disease process, treatment plan, medications, and discharge instructions  Description: Complete learning assessment and assess knowledge base.  Interventions:  - Provide teaching at level of understanding  - Provide teaching via preferred learning methods  Outcome: Progressing     Problem: MOBILITY - ADULT  Goal: Maintain or return to baseline ADL function  Description: INTERVENTIONS:  -  Assess patient's ability to carry out ADLs; assess patient's baseline for ADL function and identify physical deficits which impact ability to perform ADLs (bathing, care of mouth/teeth, toileting, grooming, dressing, etc.)  - Assess/evaluate cause of self-care deficits   - Assess range of motion  - Assess patient's mobility; develop plan if impaired  - Assess patient's need for assistive devices and provide as appropriate  - Encourage maximum independence but intervene and supervise when necessary  - Involve family in performance of ADLs  - Assess for home care needs following discharge   - Consider OT consult to assist with ADL evaluation and planning for discharge  - Provide patient education as  appropriate  Outcome: Progressing  Goal: Maintains/Returns to pre admission functional level  Description: INTERVENTIONS:  - Perform AM-PAC 6 Click Basic Mobility/ Daily Activity assessment daily.  - Set and communicate daily mobility goal to care team and patient/family/caregiver.   - Collaborate with rehabilitation services on mobility goals if consulted  - Perform Range of Motion 2 times a day.  - Reposition patient every 2 hours.  - Dangle patient 2 times a day  - Stand patient 2 times a day  - Ambulate patient 2 times a day  - Out of bed to chair 2 times a day   - Out of bed for meals 2 times a day  - Out of bed for toileting  - Record patient progress and toleration of activity level   Outcome: Progressing     Problem: Nutrition/Hydration-ADULT  Goal: Nutrient/Hydration intake appropriate for improving, restoring or maintaining nutritional needs  Description: Monitor and assess patient's nutrition/hydration status for malnutrition. Collaborate with interdisciplinary team and initiate plan and interventions as ordered.  Monitor patient's weight and dietary intake as ordered or per policy. Utilize nutrition screening tool and intervene as necessary. Determine patient's food preferences and provide high-protein, high-caloric foods as appropriate.     INTERVENTIONS:  - Monitor oral intake, urinary output, labs, and treatment plans  - Assess nutrition and hydration status and recommend course of action  - Evaluate amount of meals eaten  - Assist patient with eating if necessary   - Allow adequate time for meals  - Recommend/ encourage appropriate diets, oral nutritional supplements, and vitamin/mineral supplements  - Order, calculate, and assess calorie counts as needed  - Recommend, monitor, and adjust tube feedings and TPN/PPN based on assessed needs  - Assess need for intravenous fluids  - Provide specific nutrition/hydration education as appropriate  - Include patient/family/caregiver in decisions related to  nutrition  Outcome: Progressing     Problem: CARDIOVASCULAR - ADULT  Goal: Maintains optimal cardiac output and hemodynamic stability  Description: INTERVENTIONS:  - Monitor I/O, vital signs and rhythm  - Monitor for S/S and trends of decreased cardiac output  - Administer and titrate ordered vasoactive medications to optimize hemodynamic stability  - Assess quality of pulses, skin color and temperature  - Assess for signs of decreased coronary artery perfusion  - Instruct patient to report change in severity of symptoms  Outcome: Progressing  Goal: Absence of cardiac dysrhythmias or at baseline rhythm  Description: INTERVENTIONS:  - Continuous cardiac monitoring, vital signs, obtain 12 lead EKG if ordered  - Administer antiarrhythmic and heart rate control medications as ordered  - Monitor electrolytes and administer replacement therapy as ordered  Outcome: Progressing        Problem: CARDIOVASCULAR - ADULT  Goal: Maintains optimal cardiac output and hemodynamic stability  Description: INTERVENTIONS:  - Monitor I/O, vital signs and rhythm  - Monitor for S/S and trends of decreased cardiac output  - Administer and titrate ordered vasoactive medications to optimize hemodynamic stability  - Assess quality of pulses, skin color and temperature  - Assess for signs of decreased coronary artery perfusion  - Instruct patient to report change in severity of symptoms  Outcome: Progressing  Goal: Absence of cardiac dysrhythmias or at baseline rhythm  Description: INTERVENTIONS:  - Continuous cardiac monitoring, vital signs, obtain 12 lead EKG if ordered  - Administer antiarrhythmic and heart rate control medications as ordered  - Monitor electrolytes and administer replacement therapy as ordered  Outcome: Progressing

## 2024-03-07 NOTE — PROGRESS NOTES
Patient:    MRN:  [id number]    [place] Request ID:  [id number]    Level of care reserved:  Skilled Nursing Facility    Partner Reserved:  [facility], [place] [id number] [phone]    Clinical needs requested:    Geography searched:  10 miles around [phone]    Start of Service:    Request sent:  3:11pm EST on 2/19/2024 by Mireya Vanessa    Partner reserved:  2:41pm EST on 2/20/2024 by Mayda Marsh    Choice list shared:  9:33am EST on 2/20/2024 by Mayda Marsh

## 2024-03-07 NOTE — PLAN OF CARE
Problem: Potential for Falls  Goal: Patient will remain free of falls  Description: INTERVENTIONS:  - Educate patient/family on patient safety including physical limitations  - Instruct patient to call for assistance with activity   - Consult OT/PT to assist with strengthening/mobility   - Keep Call bell within reach  - Keep bed low and locked with side rails adjusted as appropriate  - Keep care items and personal belongings within reach  - Initiate and maintain comfort rounds  - Make Fall Risk Sign visible to staff  - Offer Toileting every  Hours, in advance of need  - Initiate/Maintain alarm  - Obtain necessary fall risk management equipment:   - Apply yellow socks and bracelet for high fall risk patients  - Consider moving patient to room near nurses station  Outcome: Progressing     Problem: RESPIRATORY - ADULT  Goal: Achieves optimal ventilation and oxygenation  Description: INTERVENTIONS:  - Assess for changes in respiratory status  - Assess for changes in mentation and behavior  - Position to facilitate oxygenation and minimize respiratory effort  - Oxygen administered by appropriate delivery if ordered  - Initiate smoking cessation education as indicated  - Encourage broncho-pulmonary hygiene including cough, deep breathe, Incentive Spirometry  - Assess the need for suctioning and aspirate as needed  - Assess and instruct to report SOB or any respiratory difficulty  - Respiratory Therapy support as indicated  Outcome: Progressing     Problem: Prexisting or High Potential for Compromised Skin Integrity  Goal: Skin integrity is maintained or improved  Description: INTERVENTIONS:  - Identify patients at risk for skin breakdown  - Assess and monitor skin integrity  - Assess and monitor nutrition and hydration status  - Monitor labs   - Assess for incontinence   - Turn and reposition patient  - Assist with mobility/ambulation  - Relieve pressure over bony prominences  - Avoid friction and shearing  - Provide  appropriate hygiene as needed including keeping skin clean and dry  - Evaluate need for skin moisturizer/barrier cream  - Collaborate with interdisciplinary team   - Patient/family teaching  - Consider wound care consult   Outcome: Progressing     Problem: PAIN - ADULT  Goal: Verbalizes/displays adequate comfort level or baseline comfort level  Description: Interventions:  - Encourage patient to monitor pain and request assistance  - Assess pain using appropriate pain scale  - Administer analgesics based on type and severity of pain and evaluate response  - Implement non-pharmacological measures as appropriate and evaluate response  - Consider cultural and social influences on pain and pain management  - Notify physician/advanced practitioner if interventions unsuccessful or patient reports new pain  Outcome: Progressing     Problem: INFECTION - ADULT  Goal: Absence or prevention of progression during hospitalization  Description: INTERVENTIONS:  - Assess and monitor for signs and symptoms of infection  - Monitor lab/diagnostic results  - Monitor all insertion sites, i.e. indwelling lines, tubes, and drains  - Monitor endotracheal if appropriate and nasal secretions for changes in amount and color  - [place] appropriate cooling/warming therapies per order  - Administer medications as ordered  - Instruct and encourage patient and family to use good hand hygiene technique  - Identify and instruct in appropriate isolation precautions for identified infection/condition  Outcome: Progressing  Goal: Absence of fever/infection during neutropenic period  Description: INTERVENTIONS:  - Monitor WBC    Outcome: Progressing     Problem: SAFETY ADULT  Goal: Patient will remain free of falls  Description: INTERVENTIONS:  - Educate patient/family on patient safety including physical limitations  - Instruct patient to call for assistance with activity   - Consult OT/PT to assist with strengthening/mobility   - Keep Call bell  within reach  - Keep bed low and locked with side rails adjusted as appropriate  - Keep care items and personal belongings within reach  - Initiate and maintain comfort rounds  - Make Fall Risk Sign visible to staff  - Offer Toileting every  Hours, in advance of need  - Initiate/Maintain alarm  - Obtain necessary fall risk management equipment:   - Apply yellow socks and bracelet for high fall risk patients  - Consider moving patient to room near nurses station  Outcome: Progressing  Goal: Maintain or return to baseline ADL function  Description: INTERVENTIONS:  -  Assess patient's ability to carry out ADLs; assess patient's baseline for ADL function and identify physical deficits which impact ability to perform ADLs (bathing, care of mouth/teeth, toileting, grooming, dressing, etc.)  - Assess/evaluate cause of self-care deficits   - Assess range of motion  - Assess patient's mobility; develop plan if impaired  - Assess patient's need for assistive devices and provide as appropriate  - Encourage maximum independence but intervene and supervise when necessary  - Involve family in performance of ADLs  - Assess for home care needs following discharge   - Consider OT consult to assist with ADL evaluation and planning for discharge  - Provide patient education as appropriate  Outcome: Progressing  Goal: Maintains/Returns to pre admission functional level  Description: INTERVENTIONS:  - Perform AM-PAC 6 Click Basic Mobility/ Daily Activity assessment daily.  - Set and communicate daily mobility goal to care team and patient/family/caregiver.   - Collaborate with rehabilitation services on mobility goals if consulted  - Perform Range of Motion  times a day.  - Reposition patient every  hours.  - Dangle patient  times a day  - Stand patient times a day  - Ambulate patient times a day  - Out of bed to chair  times a day   - Out of bed for meals  times a day  - Out of bed for toileting  - Record patient progress and  toleration of activity level   Outcome: Progressing     Problem: DISCHARGE PLANNING  Goal: Discharge to home or other facility with appropriate resources  Description: INTERVENTIONS:  - Identify barriers to discharge w/patient and caregiver  - Arrange for needed discharge resources and transportation as appropriate  - Identify discharge learning needs (meds, wound care, etc.)  - Arrange for interpretive services to assist at discharge as needed  - Refer to Case Management Department for coordinating discharge planning if the patient needs post-hospital services based on physician/advanced practitioner order or complex needs related to functional status, cognitive ability, or social support system  Outcome: Progressing     Problem: Knowledge Deficit  Goal: Patient/family/caregiver demonstrates understanding of disease process, treatment plan, medications, and discharge instructions  Description: Complete learning assessment and assess knowledge base.  Interventions:  - Provide teaching at level of understanding  - Provide teaching via preferred learning methods  Outcome: Progressing     Problem: MOBILITY - ADULT  Goal: Maintain or return to baseline ADL function  Description: INTERVENTIONS:  -  Assess patient's ability to carry out ADLs; assess patient's baseline for ADL function and identify physical deficits which impact ability to perform ADLs (bathing, care of mouth/teeth, toileting, grooming, dressing, etc.)  - Assess/evaluate cause of self-care deficits   - Assess range of motion  - Assess patient's mobility; develop plan if impaired  - Assess patient's need for assistive devices and provide as appropriate  - Encourage maximum independence but intervene and supervise when necessary  - Involve family in performance of ADLs  - Assess for home care needs following discharge   - Consider OT consult to assist with ADL evaluation and planning for discharge  - Provide patient education as appropriate  Outcome:  Progressing  Goal: Maintains/Returns to pre admission functional level  Description: INTERVENTIONS:  - Perform AM-PAC 6 Click Basic Mobility/ Daily Activity assessment daily.  - Set and communicate daily mobility goal to care team and patient/family/caregiver.   - Collaborate with rehabilitation services on mobility goals if consulted  - Perform Range of Motion  times a day.  - Reposition patient every  hours.  - Dangle patient  times a day  - Stand patient  times a day  - Ambulate patient  times a day  - Out of bed to chair  times a day   - Out of bed for meals  times a day  - Out of bed for toileting  - Record patient progress and toleration of activity level   Outcome: Progressing     Problem: Nutrition/Hydration-ADULT  Goal: Nutrient/Hydration intake appropriate for improving, restoring or maintaining nutritional needs  Description: Monitor and assess patient's nutrition/hydration status for malnutrition. Collaborate with interdisciplinary team and initiate plan and interventions as ordered.  Monitor patient's weight and dietary intake as ordered or per policy. Utilize nutrition screening tool and intervene as necessary. Determine patient's food preferences and provide high-protein, high-caloric foods as appropriate.     INTERVENTIONS:  - Monitor oral intake, urinary output, labs, and treatment plans  - Assess nutrition and hydration status and recommend course of action  - Evaluate amount of meals eaten  - Assist patient with eating if necessary   - Allow adequate time for meals  - Recommend/ encourage appropriate diets, oral nutritional supplements, and vitamin/mineral supplements  - Order, calculate, and assess calorie counts as needed  - Recommend, monitor, and adjust tube feedings and TPN/PPN based on assessed needs  - Assess need for intravenous fluids  - Provide specific nutrition/hydration education as appropriate  - Include patient/family/caregiver in decisions related to nutrition  Outcome:  Progressing     Problem: CARDIOVASCULAR - ADULT  Goal: Maintains optimal cardiac output and hemodynamic stability  Description: INTERVENTIONS:  - Monitor I/O, vital signs and rhythm  - Monitor for S/S and trends of decreased cardiac output  - Administer and titrate ordered vasoactive medications to optimize hemodynamic stability  - Assess quality of pulses, skin color and temperature  - Assess for signs of decreased coronary artery perfusion  - Instruct patient to report change in severity of symptoms  Outcome: Progressing  Goal: Absence of cardiac dysrhythmias or at baseline rhythm  Description: INTERVENTIONS:  - Continuous cardiac monitoring, vital signs, obtain 12 lead EKG if ordered  - Administer antiarrhythmic and heart rate control medications as ordered  - Monitor electrolytes and administer replacement therapy as ordered  Outcome: Progressing

## 2024-03-07 NOTE — CASE MANAGEMENT
[facility] received determination for [facility].   Med Director agrees with termination of services.   Liability starts: 3/07/2024    Care Manager notified:  melissa hurtado

## 2024-03-07 NOTE — CASE MANAGEMENT
Case Management Discharge Planning Note    Patient name Lauren Quintero  Location /-01 MRN [id number]  : 1945 Date 3/7/2024       Current Admission Date: 2/15/2024  Current Admission Diagnosis:Acute on chronic respiratory failure with hypoxia (HCC)   Patient Active Problem List    Diagnosis Date Noted    Elevated troponin 2024    CKD (chronic kidney disease) 2024    Dizziness 2023    Social problem 2023    Acute on chronic respiratory failure with hypoxia and hypercapnia (HCC) 2023    Diverticulitis 10/29/2023    Moderate protein-calorie malnutrition (HCC) 2023    BRBPR (bright red blood per rectum) 2023    Generalized weakness 2023    Staring episodes 2023    Wal[place]strom macroglobulinemia (HCC) 2023    Severe protein-calorie malnutrition (HCC) 2021    Positive blood culture 2021    COPD with exacerbation (HCC) 2021    Anemia 2021    Abnormal computed tomography angiography (CTA) 2021    Fatigue 2021    Major depressive disorder, recurrent episode, moderate (HCC) 2019    Flank pain 2019    CAD (coronary artery disease), native coronary artery 2018    Acute on chronic respiratory failure with hypoxia (HCC) 2018    Ambulatory dysfunction 2018    Hypokalemia 2018    Essential hypertension 2018    Tobacco abuse 2018      LOS (days): 21  Geometric Mean LOS (GMLOS) (days): 5.1  Days to GMLOS:-15.6     OBJECTIVE:  Risk of Unplanned Readmission Score: 52.1      Current admission status: Inpatient   Preferred Pharmacy:   [id number] [place] - [address]  [address]  [place] [address]  Phone: [phone] Fax: [phone]    [facility]  [place] - [address]  [address]  [address]  [place] [address]  Phone: [phone] Fax: [phone]    Primary Care Provider: Starla Bateman MD  Primary  Insurance: MEDICARE  Secondary Insurance:     DISCHARGE DETAILS:    Discharge planning discussed with:: Patient at bedside x3 today.  Freedom of Choice: Yes  Comments - Freedom of Choice: FOC maintained - CM reviewed status of [facility] appeal.  Determination in agreement with hospital discharge.  Patient frustrated and tearful, CM gave time and returned later x2.  Patient declined any call to family for update or support.  CM reviewed with STEVE who met with patient at bedside to review medical stability for d/c.  Patient agreeable to d/c to [facility] for STR due to respiratory program.  CM contacted family/caregiver?: No- see comments (Declined.)  Were Treatment Team discharge recommendations reviewed with patient/caregiver?: Yes (As it pertains to d/c planning and CM role.)  Did patient/caregiver verbalize understanding of patient care needs?: N/A- going to facility  Were patient/caregiver advised of the risks associated with not following Treatment Team discharge recommendations?: Yes (As it pertains to d/c planning and CM role.)    Requested Home Health Care         Is the patient interested in HHC at discharge?: No    DME Referral Provided  Referral made for DME?: No    Other Referral/Resources/Interventions Provided:  Interventions: Transportation, Short Term Rehab  Referral Comments: [facility] reserved for STR in [facility].  Facility contacts updated.  Transport coordinated via [employer] (BLS due to O2).  PCS to binder.  Programs:: COPD    Would you like to participate in our [facility] service program?  : No - Declined    Treatment Team Recommendation: Short Term Rehab, Home with Home Health Care  Discharge Destination Plan:: Short Term Rehab  Transport at Discharge : [facility]  Dispatcher Contacted: Yes  Number/Name of Dispatcher: [employer] [phone]  [id number]  ETA of Transport (Date): 03/07/24  ETA of Transport (Time): 9500      Transfer Mode: Stretcher     Accepting Facility Name, City & State : [facility] - [address] [place] [address]  Receiving Facility/Agency Phone Number: Phone: [phone]  Facility/Agency Fax Number: Fax: [phone]

## 2024-03-08 ENCOUNTER — NURSING HOME VISIT (OUTPATIENT)
Dept: GERIATRICS | Facility: OTHER | Age: 79
End: 2024-03-08
Payer: MEDICARE

## 2024-03-08 DIAGNOSIS — J44.1 COPD WITH EXACERBATION (HCC): Primary | ICD-10-CM

## 2024-03-08 PROCEDURE — 99306 1ST NF CARE HIGH MDM 50: CPT | Performed by: INTERNAL MEDICINE

## 2024-03-08 NOTE — TELEPHONE ENCOUNTER
S/w patient's son Imer. He stated that she is currently in inpatient rehab. Imer said they will call us back to follow up when they are ready to do so.

## 2024-03-10 VITALS
BODY MASS INDEX: 20.1 KG/M2 | DIASTOLIC BLOOD PRESSURE: 62 MMHG | HEART RATE: 96 BPM | RESPIRATION RATE: 18 BRPM | WEIGHT: 132.2 LBS | OXYGEN SATURATION: 92 % | SYSTOLIC BLOOD PRESSURE: 124 MMHG

## 2024-03-10 NOTE — PROGRESS NOTES
[facility]  [address]  [place] [address]  [id number]    Nursing Home Admission    NAME: Lauren Quintero  AGE: 78 y.o. SEX: female [phone]      Patient Location     [facility]    Patient’s care was coordinated with nursing facility staff. Recent vitals, labs and updated medications were reviewed on [facility]. Past Medical, surgical, social, medication and allergy history and patient’s previous records reviewed.       Assessment/Plan:    COPD with exacerbation (HCC)  Patient was recently hospitalized with COPD exacerbation aggravated by pneumonia and COVID-19 infection.  Patient completed course of antibiotic, remdesivir and steroids prior to discharge.  Respiratory status currently remains stable.  Continue nebulizer treatments with ipratropium and Xopenex.  Continue budesonide formoterol inhaler.      Acute on chronic respiratory failure with hypoxia:  Noted during recent hospitalization in the setting of COPD exacerbation, COVID with superimposed pneumonia.  Status posttreatment with antibiotic remdesivir and steroids.  Oxygen requirements are back to baseline.  Continue to monitor.  Follow-up with pulmonary service    Elevated troponin:  Patient presented to the hospital with elevated troponins and chest pain.  Cardiac cath showed significant triple-vessel disease.  Cardiology recommended maximizing medical therapy for CAD with outpatient follow-up with CT surgeon for potential CABG once she recovers from acute illness.  Patient was also diagnosed with Takotsubo cardiomyopathy on recent cath.  EF was 35%.  She was discharged to SNF on a LifeVest    CKD 2:  Recent creatinine was stable at 0.79.  Avoid hypotension and nephrotoxins    Major depressive disorder, recurrent episode, moderate:  Patient remains on multiple psych meds including Vraylar, as needed hydroxyzine    Essential hypertension:  Blood pressure stable on carvedilol 25 mg twice  daily and Lasix 20 mg daily    COVID-19 infection, resolved as of 3/5/2024:  Patient presented to the hospital with acute hypoxic respiratory failure attributed to COPD exacerbation, COVID-19 infection and pneumonia.  CT chest showed scattered areas of tree-in-bud opacity consistent with endobronchial impaction/infection.  She was treated with remdesivir, baricitinib,steroids,  antibiotics and neb.  Pt remains afebrile and clinically nontoxic.  Chief Complaint     Recent hospitalization for COPD exacerbation    HPI       Patient is a 78 y.o. female with past medical history significant for COPD on chronic oxygen therapy, depression, hyperlipidemia, MI, CAD, benign kidney tumor,Anxiety, CAD and CHF.    Patient was hospitalized on 2/15/2024 due to worsening shortness of breath. She was found to be COVID-positive.  CT imaging showed presence of tree-in-bud opacities representing infection with some mild pulmonary edema. She also had elevated troponin .  Patient was started on dexamethasone, Remdesivir and baricitinib.  She was seen in consultation by cardiology and pulmonary services.  Echo revealed reduced LV function of 35% and mild diastolic dysfunction.  Patient was diagnosed with non-ST elevation MI.  Patient subsequently underwent cardiac catheterization revealing triple-vessel disease 90% stenosis of LAD, 80% stenosis of mid circumflex and 70% stenosis of mid RCA.  Outpatient follow-up with CT surgery was recommended for possible CABG.  Patient was discharged to SNF with LifeVest given reduced EF.  Patient had a prolonged hospital stay complicated by increased chest congestion cough and pleuritic chest pain during the later half of her stay.  Repeat chest x-ray and CT showed an opacity in right lower lobe suspected to be pneumonia versus mucous plugging causing atelectasis.  She was started on IV ceftriaxone. She was encouraged to continue incentive spirometry, flutter valve therapy.  Antibiotics were later  switched to Unasyn for suspected aspiration pneumonia.  Swallowing study showed mild oropharyngeal retention but no obvious aspiration.  Patient clinically improved on Unasyn.  Oxygen requirements returned to baseline of 4 L.  She had some productive cough with some hemoptysis.  Hemoglobin remained stable.  Patient overall improved.  She was subsequently discharged to [facility] rehab where she is being seen for posthospital admission.  At the time of my evaluation patient is doing okay.  Dyspnea has improved.       Past Medical History:   Diagnosis Date    Acute congestive heart failure (HCC) 2021    Anxiety     Cardiac disease     Chest pain 2021    Chronic pain disorder     COPD (chronic obstructive pulmonary disease) ([facility])     COVID-19 02/15/2024    Depression     Heart disease     Hyperlipidemia     Hypertension     MI (myocardial infarction) ([facility])     MRSA (methicillin resistant Staphylococcus aureus)     Renal disorder     benign kidney tumor       Past Surgical History:   Procedure Laterality Date    APPENDECTOMY      CARDIAC CATHETERIZATION N/A 2024    Procedure: Cardiac catheterization;  Surgeon: Luke Malagon MD;  Location: [facility];  Service: Cardiology    CARDIAC CATHETERIZATION N/A 2024    Procedure: Cardiac Coronary Angiogram;  Surgeon: Luke Malagon MD;  Location: [facility];  Service: Cardiology    CARDIAC CATHETERIZATION Left 2024    Procedure: Cardiac Left Heart Cath;  Surgeon: Luke Malagon MD;  Location: [facility];  Service: Cardiology    ELBOW BURSA SURGERY Left 2018    D/T MRSA INFECTION    IR THORACENTESIS  2024    SPINAL FUSION      L7 L9       Social History     Tobacco Use   Smoking Status Former    Current packs/day: 0.00    Average packs/day: 1 pack/day for 60.0 years (60.0 ttl pk-yrs)    Types: Cigarettes    Start date:     Quit date: 2016    Years since quittin.1   Smokeless Tobacco Never    Tobacco Comments    She has close to a 60 pack-year smoking history, most recently has cut down to 4-5 cigarettes daily          Family History   Problem Relation Age of Onset    Heart disease Mother     Cancer Father         Allergies   Allergen Reactions    Sulfa Antibiotics Anaphylaxis    Iron GI Intolerance    Niacin     Statins Other (See Comments)     cramps          Current Outpatient Medications:     albuterol (VENTOLIN HFA) 90 mcg/act inhaler, Inhale 2 puffs every 6 (six) hours as needed for wheezing, Disp: 1 Inhaler, Rfl: 0    aspirin (ECOTRIN LOW STRENGTH) 81 mg EC tablet, Take 1 tablet (81 mg total) by mouth daily, Disp: 30 tablet, Rfl: 0    budesonide-formoterol (SYMBICORT) 160-4.5 mcg/act inhaler, Inhale 2 puffs 2 (two) times a day Rinse mouth after use., Disp: 10.2 g, Rfl: 0    buPROPion (WELLBUTRIN) 75 mg tablet, Take 75 mg by mouth in the morning, Disp: , Rfl:     Calcium-Magnesium-Vitamin D (CALCIUM 500 PO), Take 1,000 mg by mouth daily, Disp: , Rfl:     cariprazine (Vraylar) 1.5 MG capsule, Take 1.5 mg by mouth daily, Disp: , Rfl:     carvedilol (COREG) 25 mg tablet, Take 25 mg by mouth 2 (two) times a day with meals, Disp: , Rfl:     dextromethorphan-guaiFENesin (ROBITUSSIN DM)  mg/5 mL syrup, Take 10 mL by mouth every 4 (four) hours as needed for cough for up to 10 days, Disp: 100 mL, Rfl: 0    furosemide (LASIX) 20 mg tablet, Take 1 tablet (20 mg total) by mouth daily, Disp: 30 tablet, Rfl: 0    HYDROcodone-acetaminophen (NORCO) 5-325 mg per tablet, Take 1 tablet by mouth every 6 (six) hours as needed for pain for up to 14 days Max Daily Amount: 4 tablets, Disp: 45 tablet, Rfl: 0    hydrOXYzine HCL (ATARAX) 25 mg tablet, Take 1 tablet (25 mg total) by mouth every 6 (six) hours as needed for anxiety, Disp: , Rfl: 0    ipratropium (ATROVENT) 0.02 % nebulizer solution, Take 2.5 mL (0.5 mg total) by nebulization 3 (three) times a day, Disp: 225 mL, Rfl: 0    lidocaine (LIDODERM) 5 %, Apply  2 patches topically over 12 hours daily for 10 days Remove & Discard patch within 12 hours or as directed by MD, Disp: 20 patch, Rfl: 0    meclizine (ANTIVERT) 25 mg tablet, Take 1 tablet (25 mg total) by mouth every 8 (eight) hours as needed for dizziness for up to 7 days, Disp: 10 tablet, Rfl: 0    melatonin 3 mg, Take 1 tablet (3 mg total) by mouth daily at bedtime as needed (insomnia), Disp: 30 tablet, Rfl: 0    midodrine (PROAMATINE) 2.5 mg tablet, Take 1 tablet (2.5 mg total) by mouth 3 (three) times a day before meals, Disp: 90 tablet, Rfl: 0    nitroglycerin (NITROSTAT) 0.4 mg SL tablet, Place 0.4 mg under the tongue every 5 (five) minutes as needed for chest pain, Disp: , Rfl:     nystatin (MYCOSTATIN) 500,000 units/5 mL suspension, Swish and swallow 5 mL (500,000 Units total) 4 (four) times a day for 5 days, Disp: 100 mL, Rfl: 0    pantoprazole (PROTONIX) 40 mg tablet, Take 1 tablet (40 mg total) by mouth daily, Disp: 30 tablet, Rfl: 0    pravastatin (PRAVACHOL) 80 mg tablet, Take 1 tablet (80 mg total) by mouth every evening, Disp: 30 tablet, Rfl: 0    vilazodone (VIIBRYD) 40 mg tablet, Take 40 mg by mouth daily with breakfast, Disp: , Rfl:     Zanubrutinib 80 MG CAPS, Take 160 mg by mouth 2 (two) times a day, Disp: , Rfl:     Updated list was reviewed in [facility] system of facility.     Vitals:    03/10/24 1125   BP: 124/62   Pulse: 96   Resp: 18   SpO2: 92%       Vital signs were reviewed in [facility]    Review of Systems   Constitutional:  Positive for fatigue. Negative for chills and fever.   HENT:  Negative for nosebleeds and rhinorrhea.    Eyes:  Negative for discharge and redness.   Respiratory:  Positive for cough and shortness of breath. Negative for chest tightness, wheezing and stridor.    Cardiovascular:  Negative for chest pain and leg swelling.   Gastrointestinal:  Negative for abdominal distention, abdominal pain, diarrhea and vomiting.   Genitourinary:  Negative for dysuria,  flank pain and hematuria.   Musculoskeletal:  Positive for back pain and gait problem. Negative for arthralgias.   Skin:  Negative for pallor.   Neurological:  Positive for weakness (Generalized). Negative for tremors, seizures, syncope and headaches.   Psychiatric/Behavioral:  Negative for agitation, behavioral problems and confusion.        Physical Exam  Constitutional:       General: She is not in acute distress.  HENT:      Head: Normocephalic and atraumatic.      Nose: No rhinorrhea.   Eyes:      General: No scleral icterus.        Right eye: No discharge.         Left eye: No discharge.   Cardiovascular:      Rate and Rhythm: Normal rate and regular rhythm.   Pulmonary:      Breath sounds: No wheezing, rhonchi or rales.      Comments: Pt on Oxygen  Abdominal:      General: There is no distension.      Palpations: Abdomen is soft.      Tenderness: There is no abdominal tenderness. There is no guarding.   Musculoskeletal:      Cervical back: Neck supple.      Right lower leg: No edema.      Left lower leg: No edema.   Skin:     Coloration: Skin is not jaundiced.   Neurological:      General: No focal deficit present.      Mental Status: She is oriented to person, place, and time. Mental status is at baseline.      Cranial Nerves: No cranial nerve deficit.   Psychiatric:         Mood and Affect: Mood normal.         Behavior: Behavior normal.           Diagnostic Data       Recent labs and imaging studies were reviewed.  Lab Results   Component Value Date    WBC 9.46 03/07/2024    HGB 9.7 (L) 03/07/2024    HCT 30.6 (L) 03/07/2024    MCV 96 03/07/2024     03/07/2024         Lab Results   Component Value Date    SODIUM 140 03/07/2024    K 4.0 03/07/2024     03/07/2024    CO2 34 (H) 03/07/2024    BUN 24 03/07/2024    CREATININE 0.77 03/07/2024    GLUC 106 03/07/2024    CALCIUM 8.7 03/07/2024      Code Status:      Full code        Portions of the record may have been created with voice recognition  "software.  Occasional wrong word or \"sound a like\" substitutions may have occurred due to the inherent limitations of voice recognition software.  Read the chart carefully and recognize, using context, where substitutions have occurred.    This note was electronically signed by Dr. Annamarie Muhammad   "

## 2024-03-10 NOTE — ASSESSMENT & PLAN NOTE
Patient was recently hospitalized with COPD exacerbation aggravated by pneumonia and COVID-19 infection.  Patient completed course of antibiotic, remdesivir and steroids prior to discharge.  Respiratory status currently remains stable.  Continue nebulizer treatments with ipratropium and Xopenex.  Continue budesonide formoterol inhaler.

## 2024-03-11 ENCOUNTER — NURSING HOME VISIT (OUTPATIENT)
Dept: GERIATRICS | Facility: OTHER | Age: 79
End: 2024-03-11
Payer: MEDICARE

## 2024-03-11 ENCOUNTER — PATIENT OUTREACH (OUTPATIENT)
Dept: CASE MANAGEMENT | Facility: OTHER | Age: 79
End: 2024-03-11

## 2024-03-11 VITALS
BODY MASS INDEX: 20.1 KG/M2 | RESPIRATION RATE: 18 BRPM | DIASTOLIC BLOOD PRESSURE: 76 MMHG | HEART RATE: 78 BPM | SYSTOLIC BLOOD PRESSURE: 132 MMHG | TEMPERATURE: 97.3 F | WEIGHT: 132.2 LBS | OXYGEN SATURATION: 95 %

## 2024-03-11 DIAGNOSIS — J44.1 COPD WITH EXACERBATION (HCC): Primary | ICD-10-CM

## 2024-03-11 PROCEDURE — 99309 SBSQ NF CARE MODERATE MDM 30: CPT | Performed by: INTERNAL MEDICINE

## 2024-03-11 NOTE — PROGRESS NOTES
[facility]  [address]   [address]  [place], [place], [address] [phone]    Progress Note  Code SNF 31    Patient Location     [facility]    Reason for visit         Patient’s care was coordinated with nursing facility staff. Recent vitals, labs and updated medications [facility].     Problem List Items Addressed This Visit          Respiratory    COPD with exacerbation (HCC) - Primary     History of recent hospitalization with COPD exacerbation, COVID-19 infection and pneumonia.  Patient was treated with remdesivir and steroids and antibiotic SaO2 stable at 95%..  Patient remains on chronic oxygen.  Continue nebulizer treatment with ipratropium and Xopenex.   Patient has been refusing to take Advair per respiratory therapist.  Of note patient has history of severe COPD needing multiple hospitalizations in the past.  Follow-up with pulmonary service            Acute on chronic respiratory failure with hypoxia:  Patient was recently hospitalized with acute hypoxic respiratory failure in the setting of COPD exacerbation, COVID-19 infection with superimposed pneumonia.  Oxygen requirements are back to baseline.  Status post remdesivir antibiotic and steroids treatment.  Follow-up with pulmonary service    Non-ST elevation MI:  Patient had an episode of chest pain in the hospital with elevated troponins.  Cardiac cath revealed significant triple-vessel disease.  Outpatient follow-up with CT surgeon for potential CABG was recommended once patient recovers from acute illness.  She was also diagnosed with Takotsubo cardiomyopathy on recent cath.  EF was 35%.  Patient currently has a LifeVest in place.    Essential hypertension:  Blood pressure stable on carvedilol 25 mg twice daily and Lasix 20 mg daily      COVID-19 infection:  Patient presented to the hospital with acute hypoxic respiratory failure attributed to COPD exacerbation  pneumonia and COVID-19 infection.  CT chest showed scattered areas of tree-in-bud opacity consistent with endobronchial impaction/infection.  She was treated with remdesivir but recently with steroid antibiotic and well.  Remains afebrile with stable SaO2.  Continue to monitor    Depression:  Pt remains on  Vraylar, Vilzodone, Bupropion and PRN Hydroxyzine    Waldenström macroglobinemia:  Pt remains  zanubrutinib 160 mg twice a day.  Follow-up with oncology service  HPI       Patient is being seen for a follow-up visit today.  She is doing okay at present.  Dyspnea has improved.  SaO2 remaines stable on oxygen.  No reports of any fever chills or wheezing.  Patient is incontinent of bowel and bladder.  Per staff patient has had intermittent aches and pains needing hydrocodone intermittently.      Review of Systems   Constitutional:  Negative for chills and fever.   Respiratory:  Positive for shortness of breath (at times). Negative for wheezing and stridor.    Cardiovascular:  Negative for chest pain and leg swelling.   Gastrointestinal:  Negative for abdominal distention, abdominal pain and vomiting.   Genitourinary:  Negative for flank pain and hematuria.   Musculoskeletal:  Positive for arthralgias, back pain and gait problem.   Neurological:  Positive for weakness. Negative for syncope.   Psychiatric/Behavioral:  Negative for agitation and behavioral problems.        Past Medical History:   Diagnosis Date    Acute congestive heart failure (HCC) 08/27/2021    Anxiety     Cardiac disease     Chest pain 08/27/2021    Chronic pain disorder     COPD (chronic obstructive pulmonary disease) ([facility])     COVID-19 02/15/2024    Depression     Heart disease     Hyperlipidemia     Hypertension     MI (myocardial infarction) ([facility])     MRSA (methicillin resistant Staphylococcus aureus)     Renal disorder     benign kidney tumor       Past Surgical History:   Procedure Laterality Date    APPENDECTOMY      CARDIAC CATHETERIZATION  N/A 2024    Procedure: Cardiac catheterization;  Surgeon: Luke Malagon MD;  Location: [facility];  Service: Cardiology    CARDIAC CATHETERIZATION N/A 2024    Procedure: Cardiac Coronary Angiogram;  Surgeon: Luke Malagon MD;  [facility]: [facility];  Service: Cardiology    CARDIAC CATHETERIZATION Left 2024    Procedure: Cardiac Left Heart Cath;  Surgeon: Luke Malagon MD;  Location: [facility];  Service: Cardiology    ELBOW BURSA SURGERY Left 2018    D/T MRSA INFECTION    IR THORACENTESIS  2024    SPINAL FUSION      L7 L9       Social History     Tobacco Use   Smoking Status Former    Current packs/day: 0.00    Average packs/day: 1 pack/day for 60.0 years (60.0 ttl pk-yrs)    Types: Cigarettes    Start date:     Quit date:     Years since quittin.1   Smokeless Tobacco Never   Tobacco Comments    She has close to a 60 pack-year smoking history, most recently has cut down to 4-5 cigarettes daily       Family History   Problem Relation Age of Onset    Heart disease Mother     Cancer Father         Allergies   Allergen Reactions    Sulfa Antibiotics Anaphylaxis    Iron GI Intolerance    Niacin     Statins Other (See Comments)     cramps         Current Outpatient Medications:     albuterol (VENTOLIN HFA) 90 mcg/act inhaler, Inhale 2 puffs every 6 (six) hours as needed for wheezing, Disp: 1 Inhaler, Rfl: 0    aspirin (ECOTRIN LOW STRENGTH) 81 mg EC tablet, Take 1 tablet (81 mg total) by mouth daily, Disp: 30 tablet, Rfl: 0    buPROPion (WELLBUTRIN) 75 mg tablet, Take 75 mg by mouth in the morning, Disp: , Rfl:     Calcium-Magnesium-Vitamin D (CALCIUM 500 PO), Take 1,000 mg by mouth daily, Disp: , Rfl:     cariprazine (Vraylar) 1.5 MG capsule, Take 1.5 mg by mouth daily, Disp: , Rfl:     carvedilol (COREG) 25 mg tablet, Take 25 mg by mouth 2 (two) times a day with meals, Disp: , Rfl:     dextromethorphan-guaiFENesin (ROBITUSSIN DM)  mg/5 mL  syrup, Take 10 mL by mouth every 4 (four) hours as needed for cough for up to 10 days, Disp: 100 mL, Rfl: 0    furosemide (LASIX) 20 mg tablet, Take 1 tablet (20 mg total) by mouth daily, Disp: 30 tablet, Rfl: 0    HYDROcodone-acetaminophen (NORCO) 5-325 mg per tablet, Take 1 tablet by mouth every 6 (six) hours as needed for pain for up to 14 days Max Daily Amount: 4 tablets, Disp: 45 tablet, Rfl: 0    hydrOXYzine HCL (ATARAX) 25 mg tablet, Take 1 tablet (25 mg total) by mouth every 6 (six) hours as needed for anxiety, Disp: , Rfl: 0    ipratropium (ATROVENT) 0.02 % nebulizer solution, Take 2.5 mL (0.5 mg total) by nebulization 3 (three) times a day, Disp: 225 mL, Rfl: 0    lidocaine (LIDODERM) 5 %, Apply 2 patches topically over 12 hours daily for 10 days Remove & Discard patch within 12 hours or as directed by MD, Disp: 20 patch, Rfl: 0    meclizine (ANTIVERT) 25 mg tablet, Take 1 tablet (25 mg total) by mouth every 8 (eight) hours as needed for dizziness for up to 7 days, Disp: 10 tablet, Rfl: 0    melatonin 3 mg, Take 1 tablet (3 mg total) by mouth daily at bedtime as needed (insomnia), Disp: 30 tablet, Rfl: 0    midodrine (PROAMATINE) 2.5 mg tablet, Take 1 tablet (2.5 mg total) by mouth 3 (three) times a day before meals, Disp: 90 tablet, Rfl: 0    nitroglycerin (NITROSTAT) 0.4 mg SL tablet, Place 0.4 mg under the tongue every 5 (five) minutes as needed for chest pain, Disp: , Rfl:     pantoprazole (PROTONIX) 40 mg tablet, Take 1 tablet (40 mg total) by mouth daily, Disp: 30 tablet, Rfl: 0    pravastatin (PRAVACHOL) 80 mg tablet, Take 1 tablet (80 mg total) by mouth every evening, Disp: 30 tablet, Rfl: 0    vilazodone (VIIBRYD) 40 mg tablet, Take 40 mg by mouth daily with breakfast, Disp: , Rfl:     Zanubrutinib 80 MG CAPS, Take 160 mg by mouth 2 (two) times a day, Disp: , Rfl:     Updated list was reviewed in [facility] system of facility.     Vitals:    03/11/24 1011   BP: 132/76   Pulse: 78   Resp: 18  "  Temp: (!) 97.3 °F (36.3 °C)   SpO2: 95%       Physical Exam  Constitutional:       General: She is not in acute distress.  HENT:      Head: Normocephalic and atraumatic.   Eyes:      General: No scleral icterus.  Cardiovascular:      Rate and Rhythm: Normal rate and regular rhythm.   Pulmonary:      Breath sounds: No wheezing, rhonchi or rales.      Comments: Decreased breath sounds bilaterally  Abdominal:      General: There is no distension.      Palpations: Abdomen is soft.      Tenderness: There is no abdominal tenderness. There is no guarding.   Musculoskeletal:      Cervical back: Neck supple.      Right lower leg: No edema.      Left lower leg: No edema.   Skin:     Coloration: Skin is not jaundiced.      Comments: Ecchymosis involving bilateral upper extremities   Neurological:      General: No focal deficit present.      Cranial Nerves: No cranial nerve deficit.      Motor: Weakness present.   Psychiatric:         Mood and Affect: Mood normal.         Behavior: Behavior normal.         Diagnostic Data:  Lab Results   Component Value Date    WBC 9.46 03/07/2024    HGB 9.7 (L) 03/07/2024    HCT 30.6 (L) 03/07/2024    MCV 96 03/07/2024     03/07/2024      Lab Results   Component Value Date    SODIUM 140 03/07/2024    K 4.0 03/07/2024     03/07/2024    CO2 34 (H) 03/07/2024    BUN 24 03/07/2024    CREATININE 0.77 03/07/2024    GLUC 106 03/07/2024    CALCIUM 8.7 03/07/2024          Portions of the record may have been created with voice recognition software.  Occasional wrong word or \"sound a like\" substitutions may have occurred due to the inherent limitations of voice recognition software.  Read the chart carefully and recognize, using context, where substitutions have occurred.    This note was electronically signed by Dr. Annamarie Muhammad   "

## 2024-03-11 NOTE — ASSESSMENT & PLAN NOTE
History of recent hospitalization with COPD exacerbation, COVID-19 infection and pneumonia.  Patient was treated with remdesivir and steroids and antibiotic SaO2 stable at 95%..  Patient remains on chronic oxygen.  Continue nebulizer treatment with ipratropium and Xopenex.   Patient has been refusing to take Advair per respiratory therapist.  Of note patient has history of severe COPD needing multiple hospitalizations in the past.  Follow-up with pulmonary service

## 2024-03-11 NOTE — PROGRESS NOTES
OP RT CM received incoming in basket. Patient is currently at Replaced by [facility] for STR. I will outreach patient once discharged to home.

## 2024-03-13 ENCOUNTER — OFFICE VISIT (OUTPATIENT)
Dept: CARDIOLOGY CLINIC | Facility: SKILLED NURSING FACILITY | Age: 79
End: 2024-03-13
Payer: MEDICARE

## 2024-03-13 DIAGNOSIS — I10 ESSENTIAL HYPERTENSION: ICD-10-CM

## 2024-03-13 DIAGNOSIS — I50.42 CHRONIC COMBINED SYSTOLIC AND DIASTOLIC CHF (CONGESTIVE HEART FAILURE) (HCC): ICD-10-CM

## 2024-03-13 DIAGNOSIS — I25.118 CORONARY ARTERY DISEASE OF NATIVE ARTERY OF NATIVE HEART WITH STABLE ANGINA PECTORIS (HCC): Primary | ICD-10-CM

## 2024-03-13 PROCEDURE — 99305 1ST NF CARE MODERATE MDM 35: CPT | Performed by: INTERNAL MEDICINE

## 2024-03-13 NOTE — PROGRESS NOTES
[facility] Consultation - Cardiology     Lauren Quintero 78 y.o. female MRN: [id number]        Assessment:  1. Coronary artery disease of native artery of native heart with stable angina pectoris (HCC)        2. Essential hypertension        3. Chronic combined systolic and diastolic CHF (congestive heart failure) (HCC)          78-year-old female with COPD, recent admission to [facility] for hypoxic respiratory failure. In that setting, she was found to have COVID-pneumonia. Sepsis. Elevated troponin and ultimately found to have acute combined CHF with ejection fraction 35%. She underwent a cardiac catheterization which disclosed multivessel coronary artery disease.    She was started on appropriate medical therapy, fitted with a LifeVest, and is discharged to rehab.    Plan:  Combined CHF:  Volume status currently appears stable. Continue 20 mg p.o. Lasix daily. I see that she is on carvedilol 25 mg twice a day and is also on as needed midodrine. Would recommend changing to Toprol-XL which may not drop her blood pressure as low, and discontinue the midodrine. I would like to avoid increasing her afterload. Our cardiology team will follow-up next week. If her blood pressure remained stable, aim will be to add afterload reduction with ARB, ACE inhibitor, or Entresto depending on how her blood pressure is.  Consideration for SGLT2 inhibitor in the future as well as spironolactone.    There was mention that her reduction in LV function may be in the setting of stress-induced cardiomyopathy as opposed to purely an ischemic cardiomyopathy. I would like to repeat a limited echo at some point in the future, but it is currently unclear how long the patient will be in rehab. I would also like to give her some time with some goal-directed heart failure therapy. We can continue to follow her while she is in [facility]. Otherwise, she was at the [facility] and can follow-up there. We  will order a limited echo at some point moving forward.    Her LifeVest is in place. With limited echo again in the future, we can see if EF increases greater than 35%, then LifeVest could be removed.    Coronary artery disease:  Multivessel coronary artery disease was identified on cardiac catheterization during the most recent hospitalization. There was no thrombotic lesion. This does not appear to be primary ACS during her prior hospitalization. She is currently without any symptoms of angina. She has noncardiac sounding back pain which does not seem to be related to coronary artery disease. She is on aspirin. She has pretty significant ecchymosis so I will not start Plavix at this time. Continue high-dose pravastatin. Blood pressure as noted above. She will eventually need outpatient follow-up with cardiothoracic surgery. As it stands, in her current state she would not be a good surgical candidate so I would prefer that she do adequate rehab, titrate medical therapy and then follow-up ultimately after discharge from rehab.          History of Present Illness   Reason for Consult / Principal Problem: Congestive heart failure, coronary artery disease.  HPI: Lauren Quintero is a 78 y.o. year old female who was admitted to [facility] after a hospitalization at [facility]. She presented there with acute on chronic hypoxic respiratory failure in the setting of COPD with superimposed COVID, sepsis.    In that setting, she was also found to have elevated troponin. She was evaluated by cardiology during that hospitalization. An echocardiogram ultimately showed that she had an ejection fraction of 35% with regional wall motion abnormalities present. She underwent a cardiac catheterization which showed multivessel coronary artery disease.    She was started on goal directed heart failure therapy as her blood pressure would allow. She is on Coreg and 20 mg of Lasix daily. She is on max dose Coreg. It appears  she was also started on midodrine although at a low dose and here, her blood pressure has overall been controlled.    She was fitted with a LifeVest. She was also advised to follow-up with cardiothoracic surgery after she has improved with regards to the acute illness for consideration of CABG.    At rehab, she has not had any cardiac complaints. She continues to be weak. She has not done a lot with physical therapy, necessarily. It appears her rehab may be somewhat prolonged.    No alarms or device firings from her LifeVest. She does feel occasionally lightheaded. She is using supplemental oxygen, which she does at home, also.      Review of Systems:  Fatigue, weakness, imbalance, shortness of breath, back pain.  Otherwise, 12 point ROS negative    Historical Information   Past Medical History:   Diagnosis Date    Acute congestive heart failure (HCC) 08/27/2021    Anxiety     Cardiac disease     Chest pain 08/27/2021    Chronic pain disorder     COPD (chronic obstructive pulmonary disease) (Formerly [facility])     COVID-19 02/15/2024    Depression     Heart disease     Hyperlipidemia     Hypertension     MI (myocardial infarction) (Formerly [facility])     MRSA (methicillin resistant Staphylococcus aureus)     Renal disorder     benign kidney tumor     Past Surgical History:   Procedure Laterality Date    APPENDECTOMY      CARDIAC CATHETERIZATION N/A 2/21/2024    Procedure: Cardiac catheterization;  Surgeon: Luke Malagon MD;  Location: [facility];  Service: Cardiology    CARDIAC CATHETERIZATION N/A 2/21/2024    Procedure: Cardiac Coronary Angiogram;  Surgeon: Luke Malagon MD;  Location: [facility];  Service: Cardiology    CARDIAC CATHETERIZATION Left 2/21/2024    Procedure: Cardiac Left Heart Cath;  Surgeon: Luke Malagon MD;  Location: [facility];  Service: Cardiology    ELBOW BURSA SURGERY Left 02/2018    D/T MRSA INFECTION    IR THORACENTESIS  2/27/2024    SPINAL FUSION      L7 L9     Social History      Substance and Sexual Activity   Alcohol Use Never     Social History     Substance and Sexual Activity   Drug Use No     Social History     Tobacco Use   Smoking Status Former    Current packs/day: 0.00    Average packs/day: 1 pack/day for 60.0 years (60.0 ttl pk-yrs)    Types: Cigarettes    Start date:     Quit date:     Years since quittin.2   Smokeless Tobacco Never   Tobacco Comments    She has close to a 60 pack-year smoking history, most recently has cut down to 4-5 cigarettes daily     Family History: noncontributory    Meds/Allergies   Medications reviewed in PCC.  Pertinent cardiac medications include:  Aspirin 81 mg daily, carvedilol 25 mg twice a day, Lasix 20 mg daily, midodrine 2.5 mg 3 times a day, pravastatin 80 mg daily.    Allergies   Allergen Reactions    Sulfa Antibiotics Anaphylaxis    Iron GI Intolerance    Niacin     Statins Other (See Comments)     cramps       Objective   Vitals:  Reviewed in PCC and at the bedside with RN    Physical Exam:  GEN: Lauren Quintero elderly female. Laying in bed.  HEENT: pupils equal, round, and reactive to light; extraocular muscles intact  NECK: supple, no carotid bruits   HEART: regular rhythm, normal S1 and S2, no murmurs, clicks, gallops or rubs   LUNGS: clear to auscultation bilaterally; no wheezes, rales, or rhonchi   ABDOMEN: normal bowel sounds, soft, no tenderness, no distention  EXTREMITIES: peripheral pulses normal; no clubbing, cyanosis, or edema  NEURO: nonfocal  SKIN: ecchymoses    Lab Results:         Results from last 7 days   Lab Units 24  0425   WBC Thousand/uL 9.46   HEMOGLOBIN g/dL 9.7*   HEMATOCRIT % 30.6*   PLATELETS Thousands/uL 185         Results from last 7 days   Lab Units 24  0425   POTASSIUM mmol/L 4.0   CHLORIDE mmol/L 101   CO2 mmol/L 34*   BUN mg/dL 24   CREATININE mg/dL 0.77   CALCIUM mg/dL 8.7         Results from last 7 days   Lab Units 24  0425   MAGNESIUM mg/dL 1.8*       Imaging: I have  personally reviewed pertinent reports.      Cardiac Cath:    Mid LAD lesion is 90% stenosed.    Mid Cx lesion is 85% stenosed.    Prox LAD to Mid LAD lesion is 80% stenosed.    Mid RCA lesion is 70% stenosed.    1st Mrg lesion is 75% stenosed.    Patent mid RCA stent.     Three-vessel coronary artery disease as described.  Mildly elevated LVEDP.       Echo:  Left Ventricle: Left ventricular cavity size is normal. Wall thickness is mildly increased. There is mild asymmetric hypertrophy of the basal septal wall. The left ventricular ejection fraction is 35%. Systolic function is severely reduced. Diastolic function is mildly abnormal, consistent with grade I (abnormal) relaxation.    The following segments are hypokinetic: mid anteroseptal, mid inferoseptal, apical anterior, apical septal, apical inferior and apex.    Mitral Valve: There is severe annular calcification.    Tricuspid Valve: There is mild regurgitation.    IVC/SVC: The inferior vena cava is dilated.    Pulmonary Artery: The estimated pulmonary artery systolic pressure is 40.0 mmHg. The pulmonary artery systolic pressure is mildly increased.

## 2024-03-14 ENCOUNTER — NURSING HOME VISIT (OUTPATIENT)
Dept: PULMONOLOGY | Facility: CLINIC | Age: 79
End: 2024-03-14

## 2024-03-14 ENCOUNTER — NURSING HOME VISIT (OUTPATIENT)
Dept: GERIATRICS | Facility: OTHER | Age: 79
End: 2024-03-14
Payer: MEDICARE

## 2024-03-14 VITALS
TEMPERATURE: 97.5 F | BODY MASS INDEX: 20.1 KG/M2 | RESPIRATION RATE: 18 BRPM | HEART RATE: 101 BPM | OXYGEN SATURATION: 95 % | SYSTOLIC BLOOD PRESSURE: 116 MMHG | WEIGHT: 132.2 LBS | DIASTOLIC BLOOD PRESSURE: 62 MMHG

## 2024-03-14 DIAGNOSIS — J96.22 ACUTE ON CHRONIC RESPIRATORY FAILURE WITH HYPOXIA AND HYPERCAPNIA (HCC): ICD-10-CM

## 2024-03-14 DIAGNOSIS — J44.1 COPD WITH EXACERBATION (HCC): Primary | ICD-10-CM

## 2024-03-14 DIAGNOSIS — J96.21 ACUTE ON CHRONIC RESPIRATORY FAILURE WITH HYPOXIA AND HYPERCAPNIA (HCC): ICD-10-CM

## 2024-03-14 DIAGNOSIS — J96.21 ACUTE ON CHRONIC RESPIRATORY FAILURE WITH HYPOXIA (HCC): Primary | ICD-10-CM

## 2024-03-14 PROCEDURE — 99309 SBSQ NF CARE MODERATE MDM 30: CPT | Performed by: INTERNAL MEDICINE

## 2024-03-14 NOTE — PROGRESS NOTES
[facility]  [address]   [address]  [place], [place], [address] [phone]    Progress Note  Code SNF 31    Patient Location     [facility]    Reason for visit         Patient’s care was coordinated with nursing facility staff. Recent vitals, labs and updated medications were reviewed on [employer] of facility.     Problem List Items Addressed This Visit          Respiratory    COPD with exacerbation (HCC) - Primary      Pt was recently hospitalized with COPD exacerbation, COVID-19 infection and pneumonia.  She  was treated with remdesivir , steroids and antibiotic.  SaoO2 is currently stable on long term Oxygen. Continue nebulizer treatment .  F.u with pulmonary service          Anemia:  Likely multifactorial due to chronic disease. Cannot exclude GI losses.  HGB dropped to 7.8 on 3/11.  Check Iron studies, folic acid, B12, Heme ocuult  Repeat HGB. Threshold for  PRBC transfusion will be low given CAD if HGB drops further    Acute on chronic respiratory failure with hypoxia:  Patient was recently hospitalized with acute hypoxic respiratory failure in the setting of COPD exacerbation, COVID-19 infection with superimposed pneumonia. She was treated with remdesivir antibiotic and steroids. Pt remains on chronic Oxygen. Oxygen requirements are back to baseline  Follow-up with pulmonary service    Non-ST elevation MI:  Patient was noted to have positive troponin during recent hospitalization. Cardiac cath revealed significant triple-vessel disease.  Outpatient follow-up with CT surgeon for potential CABG was recommended once patient recovers from acute illness.  She was also diagnosed with Takotsubo cardiomyopathy on recent cath.  EF was 35%.  Patient currently has a LifeVest in place.  She was seen by cardiology service yesterday. F.U notes were reviewed. Carvedilol was changed to Toprol. Pt was taken off of Midodrine. BP is currently stable at 116/62. Continue to  monitor    HLD:  Continue Statin    Essential hypertension:  Blood pressure stable on Toprol XL 50 mg  daily and Lasix 20 mg daily      COVID-19 infection:  Patient presented to the hospital with acute hypoxic respiratory failure attributed to COPD exacerbation pneumonia and COVID-19 infection.  CT chest showed scattered areas of tree-in-bud opacity consistent with endobronchial impaction/infection.  She was treated with remdesivir,steroid  and antibiotic .  Remains afebrile but weak and frail    Depression:  Pt remains on  Vraylar, Vilzodone, Bupropion and PRN Hydroxyzine    Waldenström macroglobinemia:  Pt remains  zanubrutinib 160 mg twice a day.  Follow-up with oncology service  HPI       Patient is being seen for a follow-up visit today. She is doing ok at present. Remains weak and frail. Pt is awake and alert able to make her needs known. Sao2 is stable on Oxygen.  Pt was seen by cardiology service yesterday. F.U notes were reviewed. Care personally coordinated with . She was taken off of Carvedilol and Midodrine and started on Toprol. BP remains stable      Review of Systems   Constitutional:  Negative for chills and fever.   Respiratory:  Positive for shortness of breath (at times). Negative for wheezing and stridor.    Cardiovascular:  Negative for chest pain and leg swelling.   Gastrointestinal:  Negative for abdominal distention, abdominal pain and vomiting.   Genitourinary:  Negative for flank pain and hematuria.   Musculoskeletal:  Positive for arthralgias, back pain and gait problem.   Neurological:  Positive for weakness. Negative for syncope.   Psychiatric/Behavioral:  Negative for agitation and behavioral problems.        Past Medical History:   Diagnosis Date    Acute congestive heart failure (HCC) 08/27/2021    Anxiety     Cardiac disease     Chest pain 08/27/2021    Chronic pain disorder     COPD (chronic obstructive pulmonary disease) ([facility])     COVID-19 02/15/2024    Depression     Heart  disease     Hyperlipidemia     Hypertension     MI (myocardial infarction) (HCC)     MRSA (methicillin resistant Staphylococcus aureus)     Renal disorder     benign kidney tumor       Past Surgical History:   Procedure Laterality Date    APPENDECTOMY      CARDIAC CATHETERIZATION N/A 2024    Procedure: Cardiac catheterization;  Surgeon: Luke Malagon MD;  Location: [facility];  Service: Cardiology    CARDIAC CATHETERIZATION N/A 2024    Procedure: Cardiac Coronary Angiogram;  Surgeon: Luke Malagon MD;  Location: [facility];  Service: Cardiology    CARDIAC CATHETERIZATION Left 2024    Procedure: Cardiac Left Heart Cath;  Surgeon: Luke Malagon MD;  Location: [facility];  Service: Cardiology    ELBOW BURSA SURGERY Left 2018    D/T MRSA INFECTION    IR THORACENTESIS  2024    SPINAL FUSION      L7 L9       Social History     Tobacco Use   Smoking Status Former    Current packs/day: 0.00    Average packs/day: 1 pack/day for 60.0 years (60.0 ttl pk-yrs)    Types: Cigarettes    Start date:     Quit date:     Years since quittin.2   Smokeless Tobacco Never   Tobacco Comments    She has close to a 60 pack-year smoking history, most recently has cut down to 4-5 cigarettes daily       Family History   Problem Relation Age of Onset    Heart disease Mother     Cancer Father         Allergies   Allergen Reactions    Sulfa Antibiotics Anaphylaxis    Iron GI Intolerance    Niacin     Statins Other (See Comments)     cramps         Current Outpatient Medications:     albuterol (VENTOLIN HFA) 90 mcg/act inhaler, Inhale 2 puffs every 6 (six) hours as needed for wheezing, Disp: 1 Inhaler, Rfl: 0    aspirin (ECOTRIN LOW STRENGTH) 81 mg EC tablet, Take 1 tablet (81 mg total) by mouth daily, Disp: 30 tablet, Rfl: 0    buPROPion (WELLBUTRIN) 75 mg tablet, Take 75 mg by mouth in the morning, Disp: , Rfl:     Calcium-Magnesium-Vitamin D (CALCIUM 500 PO), Take 1,000  mg by mouth daily, Disp: , Rfl:     cariprazine (Vraylar) 1.5 MG capsule, Take 1.5 mg by mouth daily, Disp: , Rfl:     carvedilol (COREG) 25 mg tablet, Take 25 mg by mouth 2 (two) times a day with meals, Disp: , Rfl:     dextromethorphan-guaiFENesin (ROBITUSSIN DM)  mg/5 mL syrup, Take 10 mL by mouth every 4 (four) hours as needed for cough for up to 10 days, Disp: 100 mL, Rfl: 0    furosemide (LASIX) 20 mg tablet, Take 1 tablet (20 mg total) by mouth daily, Disp: 30 tablet, Rfl: 0    HYDROcodone-acetaminophen (NORCO) 5-325 mg per tablet, Take 1 tablet by mouth every 6 (six) hours as needed for pain for up to 14 days Max Daily Amount: 4 tablets, Disp: 45 tablet, Rfl: 0    hydrOXYzine HCL (ATARAX) 25 mg tablet, Take 1 tablet (25 mg total) by mouth every 6 (six) hours as needed for anxiety, Disp: , Rfl: 0    ipratropium (ATROVENT) 0.02 % nebulizer solution, Take 2.5 mL (0.5 mg total) by nebulization 3 (three) times a day, Disp: 225 mL, Rfl: 0    lidocaine (LIDODERM) 5 %, Apply 2 patches topically over 12 hours daily for 10 days Remove & Discard patch within 12 hours or as directed by MD, Disp: 20 patch, Rfl: 0    meclizine (ANTIVERT) 25 mg tablet, Take 1 tablet (25 mg total) by mouth every 8 (eight) hours as needed for dizziness for up to 7 days, Disp: 10 tablet, Rfl: 0    melatonin 3 mg, Take 1 tablet (3 mg total) by mouth daily at bedtime as needed (insomnia), Disp: 30 tablet, Rfl: 0    midodrine (PROAMATINE) 2.5 mg tablet, Take 1 tablet (2.5 mg total) by mouth 3 (three) times a day before meals, Disp: 90 tablet, Rfl: 0    nitroglycerin (NITROSTAT) 0.4 mg SL tablet, Place 0.4 mg under the tongue every 5 (five) minutes as needed for chest pain, Disp: , Rfl:     pantoprazole (PROTONIX) 40 mg tablet, Take 1 tablet (40 mg total) by mouth daily, Disp: 30 tablet, Rfl: 0    pravastatin (PRAVACHOL) 80 mg tablet, Take 1 tablet (80 mg total) by mouth every evening, Disp: 30 tablet, Rfl: 0    vilazodone (VIIBRYD) 40 mg  "tablet, Take 40 mg by mouth daily with breakfast, Disp: , Rfl:     Zanubrutinib 80 MG CAPS, Take 160 mg by mouth 2 (two) times a day, Disp: , Rfl:     Updated list was reviewed in [facility] of facility.     Vitals:    03/14/24 1602   BP: 116/62   Pulse: 101   Resp: 18   Temp: 97.5 °F (36.4 °C)   SpO2: 95%         Physical Exam  Constitutional:       General: She is not in acute distress.  HENT:      Head: Normocephalic and atraumatic.   Cardiovascular:      Rate and Rhythm: Normal rate and regular rhythm.   Pulmonary:      Breath sounds: No wheezing, rhonchi or rales.      Comments: Decreased breath sounds bilaterally  Abdominal:      General: There is no distension.      Palpations: Abdomen is soft.      Tenderness: There is no abdominal tenderness. There is no guarding.   Musculoskeletal:      Cervical back: Neck supple.      Right lower leg: No edema.      Left lower leg: No edema.   Skin:     Coloration: Skin is not jaundiced.      Comments: Ecchymosis involving bilateral upper extremities and infraorbital area   Neurological:      General: No focal deficit present.      Cranial Nerves: No cranial nerve deficit.      Motor: Weakness present.   Psychiatric:         Mood and Affect: Mood normal.         Behavior: Behavior normal.         Diagnostic Data:    Labs from 3/11 reviewed  HGB low at 7.8 down from 8.8, wbc 8.6, Plt 206  BMP stable        Lab Results   Component Value Date    WBC 9.46 03/07/2024    HGB 9.7 (L) 03/07/2024    HCT 30.6 (L) 03/07/2024    MCV 96 03/07/2024     03/07/2024      Lab Results   Component Value Date    SODIUM 140 03/07/2024    K 4.0 03/07/2024     03/07/2024    CO2 34 (H) 03/07/2024    BUN 24 03/07/2024    CREATININE 0.77 03/07/2024    GLUC 106 03/07/2024    CALCIUM 8.7 03/07/2024          Portions of the record may have been created with voice recognition software.  Occasional wrong word or \"sound a like\" substitutions may have occurred due to the inherent " limitations of voice recognition software.  Read the chart carefully and recognize, using context, where substitutions have occurred.    This note was electronically signed by Dr. Annamarie Muhammad

## 2024-03-14 NOTE — PROGRESS NOTES
Assessment/Plan:    Acute on chronic respiratory failure with hypoxia and hypercapnia (HCC)  Patient is back to her baseline oxygen.  I presume that she is on oxygen due to COPD although she has no PFTs on file.  I recommend she get outpatient PFTs upon discharge and follow-up in our pulmonary office.  She complains of chronic cough after COVID with increased phlegm and difficulty expectorating mucus recommend 600 mg Mucinex twice daily.  We talked about chronic inhaled therapy and she is refusing all inhalers due to her history of thrush.  I tried to explain that some inhalers do not increase the risk of thrush she would rather stick with nebulizers at this time.  She will continue Xopenex and Atrovent 3 times daily and encouraged to use a flutter valve after each 1.     Diagnoses and all orders for this visit:    Acute on chronic respiratory failure with hypoxia (HCC)    Acute on chronic respiratory failure with hypoxia and hypercapnia (HCC)          Subjective:      Patient ID: Lauren Quintero is a 78 y.o. female.    Lauren feels her breathing is close to her baseline after COVID however she still complaining of cough with difficulty expectorating mucus.  The mucus is clear in her breathing is stable.        The following portions of the patient's history were reviewed and updated as appropriate: allergies, current medications, past family history, past medical history, past social history, past surgical history, and problem list.    Review of Systems   Constitutional: Negative.  Negative for unexpected weight change.   HENT: Negative.  Negative for postnasal drip.    Eyes: Negative.    Respiratory:  Positive for cough and shortness of breath. Negative for wheezing.    Cardiovascular: Negative.  Negative for chest pain and leg swelling.   Gastrointestinal: Negative.    Endocrine: Negative.    Genitourinary: Negative.    Musculoskeletal: Negative.    Allergic/Immunologic: Negative.    Neurological: Negative.     Hematological: Negative.          Objective:      There were no vitals taken for this visit.         Physical Exam  Constitutional:       Appearance: She is well-developed.   HENT:      Head: Normocephalic.   Eyes:      Pupils: Pupils are equal, round, and reactive to light.   Cardiovascular:      Rate and Rhythm: Normal rate.      Heart sounds: No murmur heard.  Pulmonary:      Effort: Pulmonary effort is normal. No respiratory distress.      Breath sounds: Normal breath sounds. No wheezing or rales.   Abdominal:      Palpations: Abdomen is soft.   Musculoskeletal:         General: Normal range of motion.      Cervical back: Normal range of motion and neck supple.   Skin:     General: Skin is warm and dry.   Neurological:      Mental Status: She is alert and oriented to person, place, and time.

## 2024-03-14 NOTE — ASSESSMENT & PLAN NOTE
Pt was recently hospitalized with COPD exacerbation, COVID-19 infection and pneumonia.  She  was treated with remdesivir , steroids and antibiotic.  SaoO2 is currently stable on long term Oxygen. Continue nebulizer treatment .  F.u with pulmonary service

## 2024-03-14 NOTE — ASSESSMENT & PLAN NOTE
Patient is back to her baseline oxygen.  I presume that she is on oxygen due to COPD although she has no PFTs on file.  I recommend she get outpatient PFTs upon discharge and follow-up in our pulmonary office.  She complains of chronic cough after COVID with increased phlegm and difficulty expectorating mucus recommend 600 mg Mucinex twice daily.  We talked about chronic inhaled therapy and she is refusing all inhalers due to her history of thrush.  I tried to explain that some inhalers do not increase the risk of thrush she would rather stick with nebulizers at this time.  She will continue Xopenex and Atrovent 3 times daily and encouraged to use a flutter valve after each 1.

## 2024-03-18 ENCOUNTER — NURSING HOME VISIT (OUTPATIENT)
Dept: GERIATRICS | Facility: OTHER | Age: 79
End: 2024-03-18
Payer: MEDICARE

## 2024-03-18 VITALS
DIASTOLIC BLOOD PRESSURE: 62 MMHG | TEMPERATURE: 97.6 F | SYSTOLIC BLOOD PRESSURE: 131 MMHG | WEIGHT: 132.5 LBS | OXYGEN SATURATION: 97 % | HEART RATE: 85 BPM | RESPIRATION RATE: 18 BRPM | BODY MASS INDEX: 20.15 KG/M2

## 2024-03-18 DIAGNOSIS — D64.9 ANEMIA, UNSPECIFIED TYPE: Primary | ICD-10-CM

## 2024-03-18 PROCEDURE — 99310 SBSQ NF CARE HIGH MDM 45: CPT | Performed by: INTERNAL MEDICINE

## 2024-03-18 RX ORDER — FUROSEMIDE 10 MG/ML
20 INJECTION INTRAMUSCULAR; INTRAVENOUS ONCE
Status: CANCELLED | OUTPATIENT
Start: 2024-03-21

## 2024-03-18 RX ORDER — SODIUM CHLORIDE 9 MG/ML
20 INJECTION, SOLUTION INTRAVENOUS ONCE
Status: CANCELLED | OUTPATIENT
Start: 2024-03-21

## 2024-03-18 NOTE — PROGRESS NOTES
[facility]  [address]   [address]  [place], [place], [address] [phone]    Progress Note  Code SNF 31    Patient Location     [facility]    Reason for visit         Patient’s care was coordinated with nursing facility staff. Recent vitals, labs and updated medications were reviewed on [facility] facility.     Problem List Items Addressed This Visit          Blood    Anemia - Primary     Likely multifactorial due to chronic disease. Pt has history of Waldenström macroglobinemia and diverticular bleed in the past. Currently denies any GI bleed.  HGB dropped to 7.3 today from 7.8  on 3/11.  Folate 6.1, Iron 57, TIBC 167, Transferrin saturation 34.  Transfuse 1 unit of PRBC given significant CAD.              Acute on chronic respiratory failure with hypoxia:  Patient was recently hospitalized with acute hypoxic respiratory failure in the setting of COPD exacerbation, COVID-19 infection with superimposed pneumonia. She was treated with remdesivir antibiotic and steroids. O2 requirements are back to baseline Pt was seen by pulmonary service on 3/14. F.U notes were reviewed    Non-ST elevation MI:  Patient was noted to have positive troponin during recent hospitalization. Cardiac cath revealed significant triple-vessel disease.  Outpatient follow-up with CT surgeon for potential CABG was recommended once patient recovers from acute illness.  She was also diagnosed with Takotsubo cardiomyopathy on recent cath.  EF was 35%.  Patient currently has a LifeVest in place.  She was seen by cardiology service recently.  Carvedilol was changed to Toprol. Pt was taken off of Midodrine. BP remains stable    HLD:  Continue Statin    Essential hypertension:  Blood pressure stable on Toprol XL 50 mg  daily and Lasix 20 mg daily      COVID-19 infection:  Patient presented to the hospital with acute hypoxic respiratory failure attributed to COPD exacerbation pneumonia and  COVID-19 infection.  CT chest showed scattered areas of tree-in-bud opacity consistent with endobronchial impaction/infection.  She was treated with remdesivir,steroid  and antibiotic .  Sao2 remains stable at 97% on oxygen    Depression:  Pt remains on  Vraylar, Vilzodone, Bupropion and PRN Hydroxyzine    Waldenström macroglobinemia:  Pt remains  zanubrutinib 160 mg twice a day.  Follow-up with oncology service    HPI       Patient is being seen for a abnormal labs and a follow up visit.  She was noted to have further drop in her HGB to 7.3 today. Pt remains weak and frail, tends to get sob with activities.  Denies any rectal bleeding or hematemesis.  Oxygen requirements remain at baseline.  Recent cardiology and pulmonary follow up notes were reviewed        Review of Systems   Constitutional:  Negative for chills and fever.   Respiratory:  Positive for shortness of breath (with actvities). Negative for wheezing and stridor.    Cardiovascular:  Negative for chest pain and leg swelling.   Gastrointestinal:  Negative for abdominal distention, abdominal pain and vomiting.   Genitourinary:  Negative for flank pain and hematuria.   Musculoskeletal:  Positive for back pain and gait problem. Negative for arthralgias.   Neurological:  Positive for weakness. Negative for syncope.   Psychiatric/Behavioral:  Negative for agitation and behavioral problems.        Past Medical History:   Diagnosis Date    Acute congestive heart failure (HCC) 08/27/2021    Anxiety     Cardiac disease     Chest pain 08/27/2021    Chronic pain disorder     COPD (chronic obstructive pulmonary disease) ([facility])     COVID-19 02/15/2024    Depression     Heart disease     Hyperlipidemia     Hypertension     MI (myocardial infarction) ([facility])     MRSA (methicillin resistant Staphylococcus aureus)     Renal disorder     benign kidney tumor       Past Surgical History:   Procedure Laterality Date    APPENDECTOMY      CARDIAC CATHETERIZATION N/A 2/21/2024     Procedure: Cardiac catheterization;  Surgeon: Luke Malagon MD;  Location: [facility];  Service: Cardiology    CARDIAC CATHETERIZATION N/A 2024    Procedure: Cardiac Coronary Angiogram;  Surgeon: Luke Malagon MD;  Location: [facility];  Service: Cardiology    CARDIAC CATHETERIZATION Left 2024    Procedure: Cardiac Left Heart Cath;  Surgeon: Luke Malagon MD;  Location: [facility];  Service: Cardiology    ELBOW BURSA SURGERY Left 2018    D/T MRSA INFECTION    IR THORACENTESIS  2024    SPINAL FUSION      L7 L9       Social History     Tobacco Use   Smoking Status Former    Current packs/day: 0.00    Average packs/day: 1 pack/day for 60.0 years (60.0 ttl pk-yrs)    Types: Cigarettes    Start date:     Quit date:     Years since quittin.2   Smokeless Tobacco Never   Tobacco Comments    She has close to a 60 pack-year smoking history, most recently has cut down to 4-5 cigarettes daily       Family History   Problem Relation Age of Onset    Heart disease Mother     Cancer Father         Allergies   Allergen Reactions    Sulfa Antibiotics Anaphylaxis    Iron GI Intolerance    Niacin     Statins Other (See Comments)     cramps         Current Outpatient Medications:     albuterol (VENTOLIN HFA) 90 mcg/act inhaler, Inhale 2 puffs every 6 (six) hours as needed for wheezing, Disp: 1 Inhaler, Rfl: 0    aspirin (ECOTRIN LOW STRENGTH) 81 mg EC tablet, Take 1 tablet (81 mg total) by mouth daily, Disp: 30 tablet, Rfl: 0    buPROPion (WELLBUTRIN) 75 mg tablet, Take 75 mg by mouth in the morning, Disp: , Rfl:     Calcium-Magnesium-Vitamin D (CALCIUM 500 PO), Take 1,000 mg by mouth daily, Disp: , Rfl:     cariprazine (Vraylar) 1.5 MG capsule, Take 1.5 mg by mouth daily, Disp: , Rfl:     carvedilol (COREG) 25 mg tablet, Take 25 mg by mouth 2 (two) times a day with meals, Disp: , Rfl:     furosemide (LASIX) 20 mg tablet, Take 1 tablet (20 mg total) by mouth daily,  Disp: 30 tablet, Rfl: 0    HYDROcodone-acetaminophen (NORCO) 5-325 mg per tablet, Take 1 tablet by mouth every 6 (six) hours as needed for pain for up to 14 days Max Daily Amount: 4 tablets, Disp: 45 tablet, Rfl: 0    hydrOXYzine HCL (ATARAX) 25 mg tablet, Take 1 tablet (25 mg total) by mouth every 6 (six) hours as needed for anxiety, Disp: , Rfl: 0    ipratropium (ATROVENT) 0.02 % nebulizer solution, Take 2.5 mL (0.5 mg total) by nebulization 3 (three) times a day, Disp: 225 mL, Rfl: 0    lidocaine (LIDODERM) 5 %, Apply 2 patches topically over 12 hours daily for 10 days Remove & Discard patch within 12 hours or as directed by MD, Disp: 20 patch, Rfl: 0    meclizine (ANTIVERT) 25 mg tablet, Take 1 tablet (25 mg total) by mouth every 8 (eight) hours as needed for dizziness for up to 7 days, Disp: 10 tablet, Rfl: 0    melatonin 3 mg, Take 1 tablet (3 mg total) by mouth daily at bedtime as needed (insomnia), Disp: 30 tablet, Rfl: 0    midodrine (PROAMATINE) 2.5 mg tablet, Take 1 tablet (2.5 mg total) by mouth 3 (three) times a day before meals, Disp: 90 tablet, Rfl: 0    nitroglycerin (NITROSTAT) 0.4 mg SL tablet, Place 0.4 mg under the tongue every 5 (five) minutes as needed for chest pain, Disp: , Rfl:     pantoprazole (PROTONIX) 40 mg tablet, Take 1 tablet (40 mg total) by mouth daily, Disp: 30 tablet, Rfl: 0    pravastatin (PRAVACHOL) 80 mg tablet, Take 1 tablet (80 mg total) by mouth every evening, Disp: 30 tablet, Rfl: 0    vilazodone (VIIBRYD) 40 mg tablet, Take 40 mg by mouth daily with breakfast, Disp: , Rfl:     Zanubrutinib 80 MG CAPS, Take 160 mg by mouth 2 (two) times a day, Disp: , Rfl:     Updated list was reviewed in [facility].     Vitals:    03/18/24 1644   BP: 131/62   Pulse: 85   Resp: 18   Temp: 97.6 °F (36.4 °C)   SpO2: 97%         Physical Exam  Constitutional:       General: She is not in acute distress.  HENT:      Head: Normocephalic and atraumatic.   Eyes:       "General: No scleral icterus.  Cardiovascular:      Rate and Rhythm: Normal rate and regular rhythm.   Pulmonary:      Breath sounds: No wheezing, rhonchi or rales.      Comments: Decreased breath sounds bilaterally  Pt on Oxygen  Abdominal:      General: There is no distension.      Palpations: Abdomen is soft.      Tenderness: There is no abdominal tenderness. There is no guarding.   Musculoskeletal:      Cervical back: Neck supple.      Right lower leg: No edema.      Left lower leg: No edema.   Skin:     Coloration: Skin is not jaundiced.      Comments: Ecchymosis involving bilateral upper extremities   Neurological:      General: No focal deficit present.      Cranial Nerves: No cranial nerve deficit.      Motor: Weakness present.   Psychiatric:         Mood and Affect: Mood normal.         Behavior: Behavior normal.       Diagnostic Data:  Labs from today reviewed  HGB down to 7.8, B12, Folate ok, iron 57      Labs from 3/11 revealed  HGB low at 7.8 down from 8.8, wbc 8.6, Plt 206    Lab Results   Component Value Date    WBC 9.46 03/07/2024    HGB 9.7 (L) 03/07/2024    HCT 30.6 (L) 03/07/2024    MCV 96 03/07/2024     03/07/2024      Lab Results   Component Value Date    SODIUM 140 03/07/2024    K 4.0 03/07/2024     03/07/2024    CO2 34 (H) 03/07/2024    BUN 24 03/07/2024    CREATININE 0.77 03/07/2024    GLUC 106 03/07/2024    CALCIUM 8.7 03/07/2024      Attempted to call the son, could not reach. Left a message.  Transfusion center was called by the nursing staff. Appointment scheduled for 3/21 for PRBC transfusion      Portions of the record may have been created with voice recognition software.  Occasional wrong word or \"sound a like\" substitutions may have occurred due to the inherent limitations of voice recognition software.  Read the chart carefully and recognize, using context, where substitutions have occurred.    This note was electronically signed by Dr. Annamarie Muhammad   "

## 2024-03-18 NOTE — ASSESSMENT & PLAN NOTE
Likely multifactorial due to chronic disease. Pt has history of Waldenström macroglobinemia and diverticular bleed in the past. Currently denies any GI bleed.  HGB dropped to 7.3 today from 7.8  on 3/11.  Folate 6.1, Iron 57, TIBC 167, Transferrin saturation 34.  Transfuse 1 unit of PRBC given significant CAD.

## 2024-03-19 ENCOUNTER — APPOINTMENT (OUTPATIENT)
Dept: LAB | Facility: HOSPITAL | Age: 79
End: 2024-03-19
Payer: MEDICARE

## 2024-03-19 DIAGNOSIS — D64.9 ANEMIA, UNSPECIFIED TYPE: ICD-10-CM

## 2024-03-19 DIAGNOSIS — K62.5 BRBPR (BRIGHT RED BLOOD PER RECTUM): ICD-10-CM

## 2024-03-19 LAB
ABO GROUP BLD: NORMAL
BASOPHILS # BLD AUTO: 0.02 THOUSANDS/ÂΜL (ref 0–0.1)
BASOPHILS NFR BLD AUTO: 0 % (ref 0–1)
BLD GP AB SCN SERPL QL: NEGATIVE
EOSINOPHIL # BLD AUTO: 0.13 THOUSAND/ÂΜL (ref 0–0.61)
EOSINOPHIL NFR BLD AUTO: 2 % (ref 0–6)
ERYTHROCYTE [DISTWIDTH] IN BLOOD BY AUTOMATED COUNT: 14.9 % (ref 11.6–15.1)
FERRITIN SERPL-MCNC: 121 NG/ML (ref 11–307)
HCT VFR BLD AUTO: 27.1 % (ref 34.8–46.1)
HGB BLD-MCNC: 8.1 G/DL (ref 11.5–15.4)
IMM GRANULOCYTES # BLD AUTO: 0.04 THOUSAND/UL (ref 0–0.2)
IMM GRANULOCYTES NFR BLD AUTO: 1 % (ref 0–2)
IRON SATN MFR SERPL: 21 % (ref 15–50)
IRON SERPL-MCNC: 44 UG/DL (ref 50–212)
LYMPHOCYTES # BLD AUTO: 2.47 THOUSANDS/ÂΜL (ref 0.6–4.47)
LYMPHOCYTES NFR BLD AUTO: 44 % (ref 14–44)
MCH RBC QN AUTO: 29.7 PG (ref 26.8–34.3)
MCHC RBC AUTO-ENTMCNC: 29.9 G/DL (ref 31.4–37.4)
MCV RBC AUTO: 99 FL (ref 82–98)
MONOCYTES # BLD AUTO: 0.55 THOUSAND/ÂΜL (ref 0.17–1.22)
MONOCYTES NFR BLD AUTO: 10 % (ref 4–12)
NEUTROPHILS # BLD AUTO: 2.42 THOUSANDS/ÂΜL (ref 1.85–7.62)
NEUTS SEG NFR BLD AUTO: 43 % (ref 43–75)
NRBC BLD AUTO-RTO: 0 /100 WBCS
PLATELET # BLD AUTO: 240 THOUSANDS/UL (ref 149–390)
PMV BLD AUTO: 10 FL (ref 8.9–12.7)
RBC # BLD AUTO: 2.73 MILLION/UL (ref 3.81–5.12)
RH BLD: NEGATIVE
SPECIMEN EXPIRATION DATE: NORMAL
TIBC SERPL-MCNC: 207 UG/DL (ref 250–450)
UIBC SERPL-MCNC: 163 UG/DL (ref 155–355)
WBC # BLD AUTO: 5.63 THOUSAND/UL (ref 4.31–10.16)

## 2024-03-19 PROCEDURE — 86920 COMPATIBILITY TEST SPIN: CPT

## 2024-03-19 PROCEDURE — 86850 RBC ANTIBODY SCREEN: CPT | Performed by: INTERNAL MEDICINE

## 2024-03-19 PROCEDURE — 83550 IRON BINDING TEST: CPT

## 2024-03-19 PROCEDURE — 86900 BLOOD TYPING SEROLOGIC ABO: CPT

## 2024-03-19 PROCEDURE — 86900 BLOOD TYPING SEROLOGIC ABO: CPT | Performed by: INTERNAL MEDICINE

## 2024-03-19 PROCEDURE — 86901 BLOOD TYPING SEROLOGIC RH(D): CPT | Performed by: INTERNAL MEDICINE

## 2024-03-19 PROCEDURE — 86850 RBC ANTIBODY SCREEN: CPT

## 2024-03-19 PROCEDURE — 82728 ASSAY OF FERRITIN: CPT

## 2024-03-19 PROCEDURE — 86901 BLOOD TYPING SEROLOGIC RH(D): CPT

## 2024-03-19 PROCEDURE — 85025 COMPLETE CBC W/AUTO DIFF WBC: CPT

## 2024-03-19 PROCEDURE — 36415 COLL VENOUS BLD VENIPUNCTURE: CPT

## 2024-03-19 PROCEDURE — 83540 ASSAY OF IRON: CPT

## 2024-03-20 ENCOUNTER — OFFICE VISIT (OUTPATIENT)
Dept: CARDIOLOGY CLINIC | Facility: SKILLED NURSING FACILITY | Age: 79
End: 2024-03-20
Payer: MEDICARE

## 2024-03-20 ENCOUNTER — NURSING HOME VISIT (OUTPATIENT)
Dept: PULMONOLOGY | Facility: CLINIC | Age: 79
End: 2024-03-20
Payer: MEDICARE

## 2024-03-20 DIAGNOSIS — J96.21 ACUTE ON CHRONIC RESPIRATORY FAILURE WITH HYPOXIA AND HYPERCAPNIA (HCC): Primary | ICD-10-CM

## 2024-03-20 DIAGNOSIS — J96.22 ACUTE ON CHRONIC RESPIRATORY FAILURE WITH HYPOXIA AND HYPERCAPNIA (HCC): Primary | ICD-10-CM

## 2024-03-20 DIAGNOSIS — J44.9 CHRONIC OBSTRUCTIVE PULMONARY DISEASE, UNSPECIFIED COPD TYPE (HCC): ICD-10-CM

## 2024-03-20 DIAGNOSIS — I42.9 CARDIOMYOPATHY, UNSPECIFIED TYPE (HCC): ICD-10-CM

## 2024-03-20 DIAGNOSIS — R93.89 ABNORMAL CHEST X-RAY: ICD-10-CM

## 2024-03-20 DIAGNOSIS — I50.42 CHRONIC COMBINED SYSTOLIC AND DIASTOLIC CHF (CONGESTIVE HEART FAILURE) (HCC): ICD-10-CM

## 2024-03-20 DIAGNOSIS — I50.42 CHRONIC COMBINED SYSTOLIC AND DIASTOLIC CHF (CONGESTIVE HEART FAILURE) (HCC): Primary | ICD-10-CM

## 2024-03-20 DIAGNOSIS — I25.10 CORONARY ARTERY DISEASE INVOLVING NATIVE CORONARY ARTERY OF NATIVE HEART WITHOUT ANGINA PECTORIS: ICD-10-CM

## 2024-03-20 PROCEDURE — 99309 SBSQ NF CARE MODERATE MDM 30: CPT | Performed by: INTERNAL MEDICINE

## 2024-03-20 RX ORDER — FUROSEMIDE 10 MG/ML
20 INJECTION INTRAMUSCULAR; INTRAVENOUS ONCE
Status: CANCELLED | OUTPATIENT
Start: 2024-03-20

## 2024-03-20 RX ORDER — SODIUM CHLORIDE 9 MG/ML
20 INJECTION, SOLUTION INTRAVENOUS ONCE
OUTPATIENT
Start: 2024-03-20

## 2024-03-20 NOTE — PROGRESS NOTES
[facility] Progress Note - Cardiology   Lauren Quintero 78 y.o. female MRN: [id number]    Assessment/Plan:  #.  Cardiomyopathy  #.  Combined systolic and diastolic heart failure  This is a recent diagnosis during her admission in February 2024 for acute hypoxic respiratory failure.  Echocardiogram demonstrated LVEF 35% with regional wall motion abnormalities.  Cardiac catheterization demonstrated multivessel coronary artery disease.  It was suggested that her reduction in LV function may also be in the setting of Takotsubo cardiomyopathy.    BP trends reviewed and on average appears to be normotensive.  She does have some isolated high blood pressure readings.  She has anemia and is scheduled for a blood transfusion tomorrow.     Renal function and potassium normal on latest labs. So long as she doesn't have any active bleeding and BP remains stable, will plan to start Losartan 25 mg BID.  Continue metoprolol succinate 50 mg twice daily.  Further up titration of her beta-blocker may be limited due to her wheezing and COPD but we will continue to reassess.  Also consider addition of spironolactone.    Ultimately after optimization of her GDMT she will have a repeat echocardiogram to assess LV function. She will continue using LifeVest at this time.      #.  Multivessel CAD: Cardiac catheterization from 2/21/2024 shows multivessel CAD. She does not have anginal complaints. Outpatient f/u with CTS in the near future.  -Continue aspirin, statin      Subjective/Objective   Subjective: 78-year-old female with a history pertinent for COPD and combined systolic and diastolic CHF.  Briefly, she was admitted to [facility] in February 2024 for acute hypoxic respiratory failure in the setting of COPD and COVID-19.  For elevated troponin she had a transthoracic echocardiogram which showed LVEF 35% with regional wall motion abnormalities.  Cardiac catheterization showed severe multivessel CAD.  It was felt  that may also have Takotsubo cardiomyopathy on top of her CAD. Medical management and re-evaluation in the near future was recommended. She was discharged with a LifeVest.    She was seen at [facility] in follow-up on 3/13/2024.  Her carvedilol and midodrine were discontinued.  She was started on metoprolol succinate 50 mg twice daily. She was seen and examined today. She has no acute complaints.  She denies any lightheadedness, visual changes, chest pain, palpitations, shortness of breath, orthopnea.    Objective:  Vitals: Reviewed in PCC and at the bedside with RN    Physical Exam:   General appearance: alert and in no acute distress  Head: Normocephalic, without obvious abnormality, atraumatic  Neck: no carotid bruit, no JVD and supple, symmetrical, trachea midline  Lungs: Normal effort, symmetrical expansion, bilateral expiratory wheezing, no rales   Heart: S1, S2 normal and no S3 or S4. No murmurs.  Abdomen: Normal bowel sounds, soft, nontender, nondistended  Extremities: extremities normal, atraumatic, no cyanosis or edema  Pulses: 2+ and symmetric bilaterally  Skin: Skin color, texture, turgor normal   Neurologic: Grossly normal. Alert and oriented.    Medications:  Reviewed in PCC.  Pertinent cardiac medications include:  Aspirin 81 mg daily  Pravastatin 80 mg daily  Metoprolol succinate 50 mg twice daily  Furosemide 20 mg daily    Lab Results: Labs reviewed with nursing staff and in PCC      Results from last 7 days   Lab Units 03/19/24  1505   WBC Thousand/uL 5.63   HEMOGLOBIN g/dL 8.1*   HEMATOCRIT % 27.1*   PLATELETS Thousands/uL 240     Left heart catheterization-2/21/2024:    Mid LAD lesion is 90% stenosed.    Mid Cx lesion is 85% stenosed.    Prox LAD to Mid LAD lesion is 80% stenosed.    Mid RCA lesion is 70% stenosed.    1st Mrg lesion is 75% stenosed.    Patent mid RCA stent.    TTE-2/18/2024:    Left Ventricle: Left ventricular cavity size is normal. Wall thickness is mildly increased. There is  mild asymmetric hypertrophy of the basal septal wall. The left ventricular ejection fraction is 35%. Systolic function is severely reduced. Diastolic function is mildly abnormal, consistent with grade I (abnormal) relaxation.    The following segments are hypokinetic: mid anteroseptal, mid inferoseptal, apical anterior, apical septal, apical inferior and apex.    Mitral Valve: There is severe annular calcification.    Tricuspid Valve: There is mild regurgitation.    IVC/SVC: The inferior vena cava is dilated.    Pulmonary Artery: The estimated pulmonary artery systolic pressure is 40.0 mmHg. The pulmonary artery systolic pressure is mildly increased.

## 2024-03-20 NOTE — ASSESSMENT & PLAN NOTE
Back to baseline oxygen needs, which are 4LPM  - chronic hypercapnia noted, would benefit from BIPAP. Needs to avoid sedating medications.

## 2024-03-20 NOTE — PROGRESS NOTES
POS:  short term        Pulmonary Follow Up Note   Lauren Quintero 78 y.o. female MRN: [id number]  3/20/2024      Outpatient pulmonologist: none    Assessment and Plan:    Acute on chronic respiratory failure with hypoxia and hypercapnia ([facility])  Back to baseline oxygen needs, which are 4LPM  - chronic hypercapnia noted, would benefit from BIPAP. Needs to avoid sedating medications.    COPD (chronic obstructive pulmonary disease) ([facility])  - cont. Duonebs  - Refuses inhalers due to hx of thrush  - OOB, ambulate    Chronic combined systolic and diastolic CHF (congestive heart failure) ([facility])  Wt Readings from Last 3 Encounters:   03/18/24 60.1 kg (132 lb 8 oz)   03/14/24 60 kg (132 lb 3.2 oz)   03/11/24 60 kg (132 lb 3.2 oz)     LifeVest per cardiology. Diuresis with fluid/salt restriction.            Abnormal chest x-ray  Infiltrates on CXR noted. Needs f/u imaging in 6-8 weeks.      Diagnoses and all orders for this visit:    Acute on chronic respiratory failure with hypoxia and hypercapnia (HCC)    Chronic obstructive pulmonary disease, unspecified COPD type ([facility])    Chronic combined systolic and diastolic CHF (congestive heart failure) ([facility])    Abnormal chest x-ray        History of Present Illness   HPI:  Lauren Quintero is a 78 y.o. female who has lung disease after recent COVID infection.     Cough is improved with Guaifenesin BID.  Tolerating nebs. Refuses inhalers due to hx of thrush.    Baseline oxygen need is 4L for presumed COPD.    Found lying in bed, talking to palliative care. In good spirits. Denies complaints. States her cough is improved.    Planned for blood transfusion tomorrow for anemia of 7.3 g/dL.    Review of Systems  Positive as above and negative otherwise    Historical Information   Past Medical History:   Diagnosis Date    Acute congestive heart failure (HCC) 08/27/2021    Anxiety     Cardiac disease     Chest pain 08/27/2021    Chronic pain disorder     COPD (chronic obstructive pulmonary disease)  ([facility])     COVID-19 02/15/2024    Depression     Heart disease     Hyperlipidemia     Hypertension     MI (myocardial infarction) ([facility])     MRSA (methicillin resistant Staphylococcus aureus)     Renal disorder     benign kidney tumor     Past Surgical History:   Procedure Laterality Date    APPENDECTOMY      CARDIAC CATHETERIZATION N/A 2/21/2024    Procedure: Cardiac catheterization;  Surgeon: Luke Malagon MD;  Location: [facility];  Service: Cardiology    CARDIAC CATHETERIZATION N/A 2/21/2024    Procedure: Cardiac Coronary Angiogram;  Surgeon: Luke Malagon MD;  Location: [facility];  Service: Cardiology    CARDIAC CATHETERIZATION Left 2/21/2024    Procedure: Cardiac Left Heart Cath;  Surgeon: Luke Malagon MD;  Location: [facility];  Service: Cardiology    ELBOW BURSA SURGERY Left 02/2018    D/T MRSA INFECTION    IR THORACENTESIS  2/27/2024    SPINAL FUSION      L7 L9     Family History   Problem Relation Age of Onset    Heart disease Mother     Cancer Father          Meds/Allergies     Current Outpatient Medications:     albuterol (VENTOLIN HFA) 90 mcg/act inhaler, Inhale 2 puffs every 6 (six) hours as needed for wheezing, Disp: 1 Inhaler, Rfl: 0    aspirin (ECOTRIN LOW STRENGTH) 81 mg EC tablet, Take 1 tablet (81 mg total) by mouth daily, Disp: 30 tablet, Rfl: 0    buPROPion (WELLBUTRIN) 75 mg tablet, Take 75 mg by mouth in the morning, Disp: , Rfl:     Calcium-Magnesium-Vitamin D (CALCIUM 500 PO), Take 1,000 mg by mouth daily, Disp: , Rfl:     cariprazine (Vraylar) 1.5 MG capsule, Take 1.5 mg by mouth daily, Disp: , Rfl:     carvedilol (COREG) 25 mg tablet, Take 25 mg by mouth 2 (two) times a day with meals, Disp: , Rfl:     furosemide (LASIX) 20 mg tablet, Take 1 tablet (20 mg total) by mouth daily, Disp: 30 tablet, Rfl: 0    HYDROcodone-acetaminophen (NORCO) 5-325 mg per tablet, Take 1 tablet by mouth every 6 (six) hours as needed for pain for up to 14 days Max Daily  Amount: 4 tablets, Disp: 45 tablet, Rfl: 0    hydrOXYzine HCL (ATARAX) 25 mg tablet, Take 1 tablet (25 mg total) by mouth every 6 (six) hours as needed for anxiety, Disp: , Rfl: 0    ipratropium (ATROVENT) 0.02 % nebulizer solution, Take 2.5 mL (0.5 mg total) by nebulization 3 (three) times a day, Disp: 225 mL, Rfl: 0    lidocaine (LIDODERM) 5 %, Apply 2 patches topically over 12 hours daily for 10 days Remove & Discard patch within 12 hours or as directed by MD, Disp: 20 patch, Rfl: 0    meclizine (ANTIVERT) 25 mg tablet, Take 1 tablet (25 mg total) by mouth every 8 (eight) hours as needed for dizziness for up to 7 days, Disp: 10 tablet, Rfl: 0    melatonin 3 mg, Take 1 tablet (3 mg total) by mouth daily at bedtime as needed (insomnia), Disp: 30 tablet, Rfl: 0    midodrine (PROAMATINE) 2.5 mg tablet, Take 1 tablet (2.5 mg total) by mouth 3 (three) times a day before meals, Disp: 90 tablet, Rfl: 0    nitroglycerin (NITROSTAT) 0.4 mg SL tablet, Place 0.4 mg under the tongue every 5 (five) minutes as needed for chest pain, Disp: , Rfl:     pantoprazole (PROTONIX) 40 mg tablet, Take 1 tablet (40 mg total) by mouth daily, Disp: 30 tablet, Rfl: 0    pravastatin (PRAVACHOL) 80 mg tablet, Take 1 tablet (80 mg total) by mouth every evening, Disp: 30 tablet, Rfl: 0    vilazodone (VIIBRYD) 40 mg tablet, Take 40 mg by mouth daily with breakfast, Disp: , Rfl:     Zanubrutinib 80 MG CAPS, Take 160 mg by mouth 2 (two) times a day, Disp: , Rfl:   Allergies   Allergen Reactions    Sulfa Antibiotics Anaphylaxis    Iron GI Intolerance    Niacin     Statins Other (See Comments)     cramps       Vitals:    152/78; 97.5; HR 93 bpm; 18 breaths/min; 96% 4L    Physical Exam  Gen: WDWN, nad, comfortable  CVS: Regular  Lungs:clear b/l bs  Abd: soft, nd    Labs: I have personally reviewed pertinent lab results.  Lab Results   Component Value Date    WBC 5.63 03/19/2024    HGB 8.1 (L) 03/19/2024    HCT 27.1 (L) 03/19/2024    MCV 99 (H)  03/19/2024     03/19/2024     Lab Results   Component Value Date    GLUCOSE 92 08/14/2022    CALCIUM 8.7 03/07/2024    K 4.0 03/07/2024    CO2 34 (H) 03/07/2024     03/07/2024    BUN 24 03/07/2024    CREATININE 0.77 03/07/2024     Lab Results   Component Value Date     04/16/2019     Lab Results   Component Value Date    ALT 6 (L) 02/29/2024    AST 12 (L) 02/29/2024    ALKPHOS 69 02/29/2024     Labs per my interpretation show anemia, normal renal function.    Imaging and other studies: I have personally reviewed pertinent films in PACS  CXR 3/4/2 with RML and RLL consolidaiton    Pulmonary function testing: none on file    EKG, Pathology, and Other Studies: I have personally reviewed pertinent reports.    ECHO 2/18/24: EF 35%, grade 1 diastolic dysfunction  LHC 2/18/24: triple vessel disease with patent mid RCA stent    Valerie Magana D.O.  [facility]

## 2024-03-20 NOTE — ASSESSMENT & PLAN NOTE
Wt Readings from Last 3 Encounters:   03/18/24 60.1 kg (132 lb 8 oz)   03/14/24 60 kg (132 lb 3.2 oz)   03/11/24 60 kg (132 lb 3.2 oz)     LifeVest per cardiology. Diuresis with fluid/salt restriction.

## 2024-03-21 ENCOUNTER — HOSPITAL ENCOUNTER (OUTPATIENT)
Dept: INFUSION CENTER | Facility: CLINIC | Age: 79
Discharge: HOME | End: 2024-03-21
Payer: MEDICARE

## 2024-03-21 VITALS
DIASTOLIC BLOOD PRESSURE: 75 MMHG | RESPIRATION RATE: 16 BRPM | TEMPERATURE: 97.8 F | SYSTOLIC BLOOD PRESSURE: 121 MMHG | HEART RATE: 93 BPM

## 2024-03-21 DIAGNOSIS — D64.9 ANEMIA, UNSPECIFIED TYPE: Primary | ICD-10-CM

## 2024-03-21 DIAGNOSIS — I50.42 CHRONIC COMBINED SYSTOLIC AND DIASTOLIC CHF (CONGESTIVE HEART FAILURE) (HCC): Primary | ICD-10-CM

## 2024-03-21 LAB
ABO GROUP BLD: NORMAL
BLD GP AB SCN SERPL QL: NEGATIVE
RH BLD: NEGATIVE
SPECIMEN EXPIRATION DATE: NORMAL

## 2024-03-21 PROCEDURE — 36430 TRANSFUSION BLD/BLD COMPNT: CPT

## 2024-03-21 PROCEDURE — P9016 RBC LEUKOCYTES REDUCED: HCPCS

## 2024-03-21 PROCEDURE — 96374 THER/PROPH/DIAG INJ IV PUSH: CPT

## 2024-03-21 RX ORDER — SODIUM CHLORIDE 9 MG/ML
20 INJECTION, SOLUTION INTRAVENOUS ONCE
Status: COMPLETED | OUTPATIENT
Start: 2024-03-21 | End: 2024-03-21

## 2024-03-21 RX ORDER — FUROSEMIDE 10 MG/ML
20 INJECTION INTRAMUSCULAR; INTRAVENOUS ONCE
Status: COMPLETED | OUTPATIENT
Start: 2024-03-21 | End: 2024-03-21

## 2024-03-21 RX ADMIN — SODIUM CHLORIDE 20 ML/HR: 9 INJECTION, SOLUTION INTRAVENOUS at 10:01

## 2024-03-21 RX ADMIN — FUROSEMIDE 20 MG: 10 INJECTION, SOLUTION INTRAMUSCULAR; INTRAVENOUS at 13:49

## 2024-03-21 NOTE — PROGRESS NOTES
Patient arrived to unit without complaint. Patient tolerated 1 unit of PRBCs without incident. AVS provided and patient does not have a future infusion appointment at this time. Patient left in stable condition.

## 2024-03-21 NOTE — PROGRESS NOTES
Pt presents for PRBC x1. No new meds or concerns. Pt tolerated treatment without adverse reaction. Future appointments discussed. Pt will follow up with physician for future treatments if needed. AVS given.

## 2024-03-22 ENCOUNTER — NURSING HOME VISIT (OUTPATIENT)
Dept: GERIATRICS | Facility: OTHER | Age: 79
End: 2024-03-22
Payer: MEDICARE

## 2024-03-22 ENCOUNTER — TELEPHONE (OUTPATIENT)
Dept: OTHER | Facility: OTHER | Age: 79
End: 2024-03-22

## 2024-03-22 VITALS
SYSTOLIC BLOOD PRESSURE: 148 MMHG | RESPIRATION RATE: 18 BRPM | DIASTOLIC BLOOD PRESSURE: 88 MMHG | HEART RATE: 100 BPM | OXYGEN SATURATION: 97 % | BODY MASS INDEX: 20.16 KG/M2 | TEMPERATURE: 97.4 F | WEIGHT: 132.6 LBS

## 2024-03-22 DIAGNOSIS — D64.9 ANEMIA, UNSPECIFIED TYPE: Primary | ICD-10-CM

## 2024-03-22 LAB
ABO GROUP BLD BPU: NORMAL
ABO GROUP BLD BPU: NORMAL
BPU ID: NORMAL
BPU ID: NORMAL
CROSSMATCH: NORMAL
CROSSMATCH: NORMAL
UNIT DISPENSE STATUS: NORMAL
UNIT DISPENSE STATUS: NORMAL
UNIT PRODUCT CODE: NORMAL
UNIT PRODUCT CODE: NORMAL
UNIT PRODUCT VOLUME: 250 ML
UNIT PRODUCT VOLUME: 350 ML
UNIT RH: NORMAL
UNIT RH: NORMAL

## 2024-03-22 PROCEDURE — 99309 SBSQ NF CARE MODERATE MDM 30: CPT | Performed by: INTERNAL MEDICINE

## 2024-03-22 NOTE — ASSESSMENT & PLAN NOTE
Recent HGB was 7.3  Suspect anemia of chronic disease however cannot rule out GI losses  Pt was sent out for PRBC transfusion yesterday given cardiac history  Tolerated transfusion well. Repeat HGB next week

## 2024-03-22 NOTE — PROGRESS NOTES
[facility]  [address]   [address]  [place], [address] [phone]    Progress Note  Code SNF 31    Patient Location     [facility]    Reason for visit         Patient’s care was coordinated with nursing facility staff. Recent vitals, labs and updated medications were reviewed on [facility] of facility.     Problem List Items Addressed This Visit          Blood    Anemia - Primary     Recent HGB was 7.3  Suspect anemia of chronic disease however cannot rule out GI losses  Pt was sent out for PRBC transfusion yesterday given cardiac history  Tolerated transfusion well. Repeat HGB next week              Acute on chronic respiratory failure with hypoxia:  Patient was recently hospitalized with acute hypoxic respiratory failure due of COPD exacerbation, COVID-19 infection with superimposed pneumonia. She was treated with remdesivir antibiotic and steroids.     Non-ST elevation MI:  Patient was recently diagnosed with NSEMI. Cardiac cath revealed significant triple-vessel disease.  Outpatient follow-up with CT surgeon for potential CABG was recommended once patient recovers from acute illness.  She was also diagnosed with Takotsubo cardiomyopathy on recent cath.  EF was 35%.  Patient was discharged on LifeVest.  Pt has decided against CABG, would also prefer to remove the life vest. Recent cardiology f.u notes were reviewed.  Recommended to repeat ECHO  and dc life vest if EF reveals improvement    HLD:  Continue Statin    Essential hypertension:  Blood pressure stable on Toprol XL 50 mg  daily and Lasix 20 mg daily      COVID-19 infection:  Patient presented to the hospital with acute hypoxic respiratory failure attributed to COPD exacerbation pneumonia and COVID-19 infection.  CT chest showed scattered areas of tree-in-bud opacity consistent with endobronchial impaction/infection.  She was treated with remdesivir, steroid and antibiotic .  Pt remains  afebrile with stable Sao2    Waldenström macroglobinemia:  Pt remains  zanubrutinib 160 mg twice a day.  Follow-up with oncology service    Depression:  Pt remains on  Vraylar, Vilzodone, Bupropion and PRN Hydroxyzine      HPI       Patient is being seen for  a follow up visit. She is doing ok at present. Pt received one unit of PRBC at transfusion center yesterday for symptomatic anemia. Remains weak. Tends to get SOB with minimal activities. No fever chills or wheezing.      Review of Systems   Constitutional:  Negative for chills and fever.   Respiratory:  Positive for shortness of breath (with actvities). Negative for wheezing and stridor.    Cardiovascular:  Negative for chest pain and leg swelling.   Gastrointestinal:  Negative for abdominal distention, abdominal pain and vomiting.   Genitourinary:  Negative for flank pain and hematuria.   Musculoskeletal:  Positive for back pain and gait problem.   Neurological:  Positive for weakness. Negative for seizures and syncope.   Psychiatric/Behavioral:  Negative for agitation and behavioral problems.        Past Medical History:   Diagnosis Date    Acute congestive heart failure ([facility]) 08/27/2021    Anxiety     Cardiac disease     Chest pain 08/27/2021    Chronic pain disorder     COPD (chronic obstructive pulmonary disease) ([facility])     COVID-19 02/15/2024    Depression     Heart disease     Hyperlipidemia     Hypertension     MI (myocardial infarction) ([facility])     MRSA (methicillin resistant Staphylococcus aureus)     Renal disorder     benign kidney tumor       Past Surgical History:   Procedure Laterality Date    APPENDECTOMY      CARDIAC CATHETERIZATION N/A 2/21/2024    Procedure: Cardiac catheterization;  Surgeon: Luke Malagon MD;  [facility]: [facility];  Service: Cardiology    CARDIAC CATHETERIZATION N/A 2/21/2024    Procedure: Cardiac Coronary Angiogram;  Surgeon: Luke Malagon MD;  [facility]: [facility];  Service: Cardiology    CARDIAC  CATHETERIZATION Left 2024    Procedure: Cardiac Left Heart Cath;  Surgeon: Luke Malagon MD;  [facility]: [facility];  Service: Cardiology    ELBOW BURSA SURGERY Left 2018    D/T MRSA INFECTION    IR THORACENTESIS  2024    SPINAL FUSION      L7 L9       Social History     Tobacco Use   Smoking Status Former    Current packs/day: 0.00    Average packs/day: 1 pack/day for 60.0 years (60.0 ttl pk-yrs)    Types: Cigarettes    Start date:     Quit date:     Years since quittin.2   Smokeless Tobacco Never   Tobacco Comments    She has close to a 60 pack-year smoking history, most recently has cut down to 4-5 cigarettes daily       Family History   Problem Relation Age of Onset    Heart disease Mother     Cancer Father         Allergies   Allergen Reactions    Sulfa Antibiotics Anaphylaxis    Iron GI Intolerance    Niacin     Statins Other (See Comments)     cramps         Current Outpatient Medications:     albuterol (VENTOLIN HFA) 90 mcg/act inhaler, Inhale 2 puffs every 6 (six) hours as needed for wheezing, Disp: 1 Inhaler, Rfl: 0    aspirin (ECOTRIN LOW STRENGTH) 81 mg EC tablet, Take 1 tablet (81 mg total) by mouth daily, Disp: 30 tablet, Rfl: 0    buPROPion (WELLBUTRIN) 75 mg tablet, Take 75 mg by mouth in the morning, Disp: , Rfl:     Calcium-Magnesium-Vitamin D (CALCIUM 500 PO), Take 1,000 mg by mouth daily, Disp: , Rfl:     cariprazine (Vraylar) 1.5 MG capsule, Take 1.5 mg by mouth daily, Disp: , Rfl:     carvedilol (COREG) 25 mg tablet, Take 25 mg by mouth 2 (two) times a day with meals, Disp: , Rfl:     furosemide (LASIX) 20 mg tablet, Take 1 tablet (20 mg total) by mouth daily, Disp: 30 tablet, Rfl: 0    hydrOXYzine HCL (ATARAX) 25 mg tablet, Take 1 tablet (25 mg total) by mouth every 6 (six) hours as needed for anxiety, Disp: , Rfl: 0    ipratropium (ATROVENT) 0.02 % nebulizer solution, Take 2.5 mL (0.5 mg total) by nebulization 3 (three) times a day, Disp: 225 mL, Rfl: 0     lidocaine (LIDODERM) 5 %, Apply 2 patches topically over 12 hours daily for 10 days Remove & Discard patch within 12 hours or as directed by MD, Disp: 20 patch, Rfl: 0    meclizine (ANTIVERT) 25 mg tablet, Take 1 tablet (25 mg total) by mouth every 8 (eight) hours as needed for dizziness for up to 7 days, Disp: 10 tablet, Rfl: 0    melatonin 3 mg, Take 1 tablet (3 mg total) by mouth daily at bedtime as needed (insomnia), Disp: 30 tablet, Rfl: 0    midodrine (PROAMATINE) 2.5 mg tablet, Take 1 tablet (2.5 mg total) by mouth 3 (three) times a day before meals, Disp: 90 tablet, Rfl: 0    nitroglycerin (NITROSTAT) 0.4 mg SL tablet, Place 0.4 mg under the tongue every 5 (five) minutes as needed for chest pain, Disp: , Rfl:     pantoprazole (PROTONIX) 40 mg tablet, Take 1 tablet (40 mg total) by mouth daily, Disp: 30 tablet, Rfl: 0    pravastatin (PRAVACHOL) 80 mg tablet, Take 1 tablet (80 mg total) by mouth every evening, Disp: 30 tablet, Rfl: 0    vilazodone (VIIBRYD) 40 mg tablet, Take 40 mg by mouth daily with breakfast, Disp: , Rfl:     Zanubrutinib 80 MG CAPS, Take 160 mg by mouth 2 (two) times a day, Disp: , Rfl:   No current facility-administered medications for this visit.    Facility-Administered Medications Ordered in Other Visits:     alteplase (CATHFLO) injection 2 mg, 2 mg, Intracatheter, Q1MIN PRN, Annamarie Muhammad MD    Updated list was reviewed in [facility] system of facility.     Vitals:    03/22/24 1741   BP: 148/88   Pulse: 100   Resp: 18   Temp: (!) 97.4 °F (36.3 °C)   SpO2: 97%           Physical Exam  Constitutional:       General: She is not in acute distress.  HENT:      Head: Normocephalic and atraumatic.   Eyes:      General: No scleral icterus.  Cardiovascular:      Rate and Rhythm: Normal rate and regular rhythm.   Pulmonary:      Breath sounds: No wheezing, rhonchi or rales.      Comments: Pt on Oxygen  Decreased breath sounds bilaterally    Abdominal:      General: There is no distension.  "     Palpations: Abdomen is soft.      Tenderness: There is no abdominal tenderness. There is no guarding.   Musculoskeletal:      Cervical back: Neck supple.      Right lower leg: No edema.      Left lower leg: No edema.   Skin:     Coloration: Skin is not jaundiced.      Comments: Ecchymosis involving bilateral upper extremities   Neurological:      General: No focal deficit present.      Cranial Nerves: No cranial nerve deficit.      Motor: Weakness present.   Psychiatric:         Mood and Affect: Mood normal.         Behavior: Behavior normal.         Diagnostic Data:  Labs from 3/11 revealed  HGB down to 7.8,   3/18 B12, Folate ok, iron 57          Lab Results   Component Value Date    WBC 5.63 03/19/2024    HGB 8.1 (L) 03/19/2024    HCT 27.1 (L) 03/19/2024    MCV 99 (H) 03/19/2024     03/19/2024      Lab Results   Component Value Date    SODIUM 140 03/07/2024    K 4.0 03/07/2024     03/07/2024    CO2 34 (H) 03/07/2024    BUN 24 03/07/2024    CREATININE 0.77 03/07/2024    GLUC 106 03/07/2024    CALCIUM 8.7 03/07/2024            Portions of the record may have been created with voice recognition software.  Occasional wrong word or \"sound a like\" substitutions may have occurred due to the inherent limitations of voice recognition software.  Read the chart carefully and recognize, using context, where substitutions have occurred.    This note was electronically signed by Dr. Annamarie Muhammad   "

## 2024-03-25 ENCOUNTER — NURSING HOME VISIT (OUTPATIENT)
Dept: GERIATRICS | Facility: OTHER | Age: 79
End: 2024-03-25
Payer: MEDICARE

## 2024-03-25 VITALS
SYSTOLIC BLOOD PRESSURE: 145 MMHG | TEMPERATURE: 97.4 F | WEIGHT: 132.6 LBS | RESPIRATION RATE: 18 BRPM | DIASTOLIC BLOOD PRESSURE: 96 MMHG | BODY MASS INDEX: 20.16 KG/M2 | OXYGEN SATURATION: 98 % | HEART RATE: 90 BPM

## 2024-03-25 DIAGNOSIS — J44.9 CHRONIC OBSTRUCTIVE PULMONARY DISEASE, UNSPECIFIED COPD TYPE (HCC): Primary | ICD-10-CM

## 2024-03-25 PROCEDURE — 99310 SBSQ NF CARE HIGH MDM 45: CPT | Performed by: INTERNAL MEDICINE

## 2024-03-26 ENCOUNTER — PATIENT OUTREACH (OUTPATIENT)
Dept: CASE MANAGEMENT | Facility: OTHER | Age: 79
End: 2024-03-26

## 2024-03-26 NOTE — PROGRESS NOTES
[facility]  [address]   [address]  [place], [address] [phone]    Progress Note  Code SNF 31    Patient Location     [facility]    Reason for visit         Patient’s care was coordinated with nursing facility staff. Recent vitals, labs and updated medications were reviewed on [facility] facility.     Problem List Items Addressed This Visit          Respiratory    COPD (chronic obstructive pulmonary disease) (HCC) - Primary     Patient complains of increased chest congestion with coughing spells.  States she feels like she has pneumonia.  SaO2 is stable.  Per records patient has been refusing inhalers, currently remains on DuoNebs and budesonide nebulization.  Lung exam reveals decreased breath sounds.  Considering increase chest congestion and coughing spells will start tapering doses of prednisone. Check chest x-ray.  Add guaifenesin 600 mg twice daily.                 Acute on chronic respiratory failure with hypoxia:  Patient was recently hospitalized with acute hypoxic respiratory failure due of COPD exacerbation, COVID-19 infection with superimposed pneumonia. She was treated with remdesivir antibiotic and steroids.     Non-ST elevation MI:  Patient was recently diagnosed with NSEMI. Cardiac cath revealed significant triple-vessel disease.  Outpatient follow-up with CT surgeon for potential CABG was recommended once patient recovers from acute illness.  She was also diagnosed with Takotsubo cardiomyopathy on recent cath.  EF was 35%.  Patient was discharged on LifeVest.  Pt has decided against CABG, would also prefer to remove the life vest. Recent cardiology f.u notes were reviewed.  Recommended to repeat ECHO  and dc life vest if EF reveals improvement    HLD:     Essential hypertension:  Isolated increase in diastolic blood pressure levels were noted today. Other levels have been stable.  Continue Toprol-XL 50 mg twice daily and losartan 25  mg twice daily.  Monitor blood pressure closely      COVID-19 infection:  Patient presented to the hospital with acute hypoxic respiratory failure attributed to COPD exacerbation pneumonia and COVID-19 infection.  CT chest showed scattered areas of tree-in-bud opacity consistent with endobronchial impaction/infection.  She was treated with remdesivir, steroid and antibiotic .  Pt remains afebrile with stable Sao2    Waldenström macroglobinemia:  Pt remains  zanubrutinib 160 mg twice a day.  Follow-up with oncology service    Depression:  Pt remains on  Vraylar, Vilzodone, Bupropion and PRN Hydroxyzine    Goal of care discussion:  Goal of care discussion was carried out with patient.  Patient does not wish to pursue any f invasive cardiac intervention however agrees to go to the hospital if needed.  She is DNR and DNI    HPI       Patient is being seen for evaluation of increased coughing spells and chest congestion.  Patient reports having bothersome episodes of coughing.  States she feels like she has pneumonia.  SaO2 remained stable on 4 L of oxygen.  Cough is productive of yellow sputum.  No fever or chills.  Patient tends to get short of breath with activities    Review of Systems   Constitutional:  Negative for chills and fever.   Respiratory:  Positive for cough (Productive of yellow sputum) and shortness of breath (with actvities). Negative for wheezing and stridor.    Cardiovascular:  Negative for chest pain and leg swelling.   Gastrointestinal:  Negative for abdominal distention, abdominal pain and vomiting.   Genitourinary:  Negative for flank pain and hematuria.   Musculoskeletal:  Positive for back pain and gait problem.   Neurological:  Positive for weakness. Negative for seizures and syncope.   Psychiatric/Behavioral:  Negative for agitation and behavioral problems.        Past Medical History:   Diagnosis Date    Acute congestive heart failure (HCC) 08/27/2021    Anxiety     Cardiac disease     Chest  pain 2021    Chronic pain disorder     COPD (chronic obstructive pulmonary disease) ([facility])     COVID-19 02/15/2024    Depression     Heart disease     Hyperlipidemia     Hypertension     MI (myocardial infarction) ([facility])     MRSA (methicillin resistant Staphylococcus aureus)     Renal disorder     benign kidney tumor       Past Surgical History:   Procedure Laterality Date    APPENDECTOMY      CARDIAC CATHETERIZATION N/A 2024    Procedure: Cardiac catheterization;  Surgeon: Luke Malagon MD;  Location: [facility];  Service: Cardiology    CARDIAC CATHETERIZATION N/A 2024    Procedure: Cardiac Coronary Angiogram;  Surgeon: Luke Malagon MD;  [facility]: [facility];  Service: Cardiology    CARDIAC CATHETERIZATION Left 2024    Procedure: Cardiac Left Heart Cath;  Surgeon: Luke Malagon MD;  [facility];  Service: Cardiology    ELBOW BURSA SURGERY Left 2018    D/T MRSA INFECTION    IR THORACENTESIS  2024    SPINAL FUSION      L7 L9       Social History     Tobacco Use   Smoking Status Former    Current packs/day: 0.00    Average packs/day: 1 pack/day for 60.0 years (60.0 ttl pk-yrs)    Types: Cigarettes    Start date:     Quit date:     Years since quittin.2   Smokeless Tobacco Never   Tobacco Comments    She has close to a 60 pack-year smoking history, most recently has cut down to 4-5 cigarettes daily       Family History   Problem Relation Age of Onset    Heart disease Mother     Cancer Father         Allergies   Allergen Reactions    Sulfa Antibiotics Anaphylaxis    Iron GI Intolerance    Niacin     Statins Other (See Comments)     cramps         Current Outpatient Medications:     albuterol (VENTOLIN HFA) 90 mcg/act inhaler, Inhale 2 puffs every 6 (six) hours as needed for wheezing, Disp: 1 Inhaler, Rfl: 0    aspirin (ECOTRIN LOW STRENGTH) 81 mg EC tablet, Take 1 tablet (81 mg total) by mouth daily, Disp: 30 tablet, Rfl: 0     buPROPion (WELLBUTRIN) 75 mg tablet, Take 75 mg by mouth in the morning, Disp: , Rfl:     Calcium-Magnesium-Vitamin D (CALCIUM 500 PO), Take 1,000 mg by mouth daily, Disp: , Rfl:     cariprazine (Vraylar) 1.5 MG capsule, Take 1.5 mg by mouth daily, Disp: , Rfl:     carvedilol (COREG) 25 mg tablet, Take 25 mg by mouth 2 (two) times a day with meals, Disp: , Rfl:     furosemide (LASIX) 20 mg tablet, Take 1 tablet (20 mg total) by mouth daily, Disp: 30 tablet, Rfl: 0    hydrOXYzine HCL (ATARAX) 25 mg tablet, Take 1 tablet (25 mg total) by mouth every 6 (six) hours as needed for anxiety, Disp: , Rfl: 0    ipratropium (ATROVENT) 0.02 % nebulizer solution, Take 2.5 mL (0.5 mg total) by nebulization 3 (three) times a day, Disp: 225 mL, Rfl: 0    lidocaine (LIDODERM) 5 %, Apply 2 patches topically over 12 hours daily for 10 days Remove & Discard patch within 12 hours or as directed by MD, Disp: 20 patch, Rfl: 0    meclizine (ANTIVERT) 25 mg tablet, Take 1 tablet (25 mg total) by mouth every 8 (eight) hours as needed for dizziness for up to 7 days, Disp: 10 tablet, Rfl: 0    melatonin 3 mg, Take 1 tablet (3 mg total) by mouth daily at bedtime as needed (insomnia), Disp: 30 tablet, Rfl: 0    midodrine (PROAMATINE) 2.5 mg tablet, Take 1 tablet (2.5 mg total) by mouth 3 (three) times a day before meals, Disp: 90 tablet, Rfl: 0    nitroglycerin (NITROSTAT) 0.4 mg SL tablet, Place 0.4 mg under the tongue every 5 (five) minutes as needed for chest pain, Disp: , Rfl:     pantoprazole (PROTONIX) 40 mg tablet, Take 1 tablet (40 mg total) by mouth daily, Disp: 30 tablet, Rfl: 0    pravastatin (PRAVACHOL) 80 mg tablet, Take 1 tablet (80 mg total) by mouth every evening, Disp: 30 tablet, Rfl: 0    vilazodone (VIIBRYD) 40 mg tablet, Take 40 mg by mouth daily with breakfast, Disp: , Rfl:     Zanubrutinib 80 MG CAPS, Take 160 mg by mouth 2 (two) times a day, Disp: , Rfl:     Medications that were added today include prednisone 40 mg for 2  days, 30 mg for 2 days, 20 mg for 2 days then 10 mg for 2 days  Guaifenesin 600 mg twice daily    Updated list was reviewed in [facility] system of facility.     Vitals:    03/25/24 1504   BP: 145/96   Pulse: 90   Resp: 18   Temp: (!) 97.4 °F (36.3 °C)   SpO2: 98%           Physical Exam  Constitutional:       General: She is not in acute distress.  HENT:      Head: Normocephalic and atraumatic.   Eyes:      General: No scleral icterus.  Cardiovascular:      Rate and Rhythm: Normal rate and regular rhythm.   Pulmonary:      Breath sounds: No wheezing or rhonchi.      Comments: Pt on Oxygen  Decreased breath sounds bilaterally    Abdominal:      General: There is no distension.      Palpations: Abdomen is soft.      Tenderness: There is no abdominal tenderness. There is no guarding.   Musculoskeletal:      Cervical back: Neck supple.      Right lower leg: No edema.      Left lower leg: No edema.   Skin:     Coloration: Skin is not jaundiced.      Comments: Ecchymosis involving bilateral upper extremities   Neurological:      General: No focal deficit present.      Cranial Nerves: No cranial nerve deficit.      Motor: Weakness present.   Psychiatric:         Mood and Affect: Mood normal.         Behavior: Behavior normal.         Diagnostic Data:  Labs from 3/11 revealed  HGB down to 7.8,   3/18 B12, Folate ok, iron 57        Lab Results   Component Value Date    WBC 5.63 03/19/2024    HGB 8.1 (L) 03/19/2024    HCT 27.1 (L) 03/19/2024    MCV 99 (H) 03/19/2024     03/19/2024      Lab Results   Component Value Date    SODIUM 140 03/07/2024    K 4.0 03/07/2024     03/07/2024    CO2 34 (H) 03/07/2024    BUN 24 03/07/2024    CREATININE 0.77 03/07/2024    GLUC 106 03/07/2024    CALCIUM 8.7 03/07/2024      Additional Notes:    Repeat labs in a.m.  Check chest x-ray  Add tapering doses of steroids and guaifenesin 600 mg twice daily  Request pulmonary follow-up      Portions of the record may have been  "created with voice recognition software.  Occasional wrong word or \"sound a like\" substitutions may have occurred due to the inherent limitations of voice recognition software.  Read the chart carefully and recognize, using context, where substitutions have occurred.    This note was electronically signed by Dr. Annamarie Muhammad   "

## 2024-03-26 NOTE — ASSESSMENT & PLAN NOTE
Patient complains of increased chest congestion with coughing spells.  States she feels like she has pneumonia.  SaO2 is stable.  Per records patient has been refusing inhalers, currently remains on DuoNebs and budesonide nebulization.  Lung exam reveals decreased breath sounds.  Considering increase chest congestion and coughing spells will start tapering doses of prednisone. Check chest x-ray.  Add guaifenesin 600 mg twice daily.

## 2024-03-26 NOTE — PROGRESS NOTES
Raiza received from OPCM the patient Admitted 2/15/24 to [facility]. Discharged 3/7/24 to [facility]. Email sent to facility to inform them I will be following the patient during their skilled stay.  This Admin Coordinator will continue to monitor via chart review.

## 2024-03-26 NOTE — PROGRESS NOTES
OP RT CM received reminder incoming in basket message. Patient is currently at [facility] for STR.  I will outreach patient once she is discharged to home.

## 2024-03-27 ENCOUNTER — OFFICE VISIT (OUTPATIENT)
Dept: CARDIOLOGY CLINIC | Facility: SKILLED NURSING FACILITY | Age: 79
End: 2024-03-27
Payer: MEDICARE

## 2024-03-27 ENCOUNTER — NURSING HOME VISIT (OUTPATIENT)
Dept: GERIATRICS | Facility: OTHER | Age: 79
End: 2024-03-27
Payer: MEDICARE

## 2024-03-27 DIAGNOSIS — I10 ESSENTIAL HYPERTENSION: ICD-10-CM

## 2024-03-27 DIAGNOSIS — I42.9 CARDIOMYOPATHY, UNSPECIFIED TYPE (HCC): Primary | ICD-10-CM

## 2024-03-27 DIAGNOSIS — I25.10 CORONARY ARTERY DISEASE INVOLVING NATIVE CORONARY ARTERY OF NATIVE HEART WITHOUT ANGINA PECTORIS: ICD-10-CM

## 2024-03-27 DIAGNOSIS — I50.42 CHRONIC COMBINED SYSTOLIC AND DIASTOLIC CHF (CONGESTIVE HEART FAILURE) (HCC): ICD-10-CM

## 2024-03-27 DIAGNOSIS — J44.9 CHRONIC OBSTRUCTIVE PULMONARY DISEASE, UNSPECIFIED COPD TYPE (HCC): Primary | ICD-10-CM

## 2024-03-27 PROCEDURE — 99309 SBSQ NF CARE MODERATE MDM 30: CPT | Performed by: INTERNAL MEDICINE

## 2024-03-27 RX ORDER — SACUBITRIL AND VALSARTAN 24; 26 MG/1; MG/1
1 TABLET, FILM COATED ORAL 2 TIMES DAILY
COMMUNITY

## 2024-03-27 NOTE — PROGRESS NOTES
Nursing Home Visit - Pulmonary   Lauren Quintero 78 y.o. female MRN: [id number]  Unit/Bed#:  Encounter: [id number]    Assessment:  Chronic hypoxic respiratory failure, stable  COPD without acute exacerbation  Chronic combined systolic and diastolic CHF   Ischemic cardiomyopathy with LVEF 35% currently on LifeVest  Recent NSTEMI with triple-vessel disease declined CABG currently on medical therapy    Plan:  Continue oxygen as needed to maintain pulse ox more than 88%  I recommend switching her inhalers to LABA/LAMA to avoid ICS and the risk of thrush.  Patient has no history of significant exacerbation or wheezing and no history of asthma  Continue diuretics, beta-blocker and other medications for CHF as per cardiology  Continue aspirin Plavix and statin and follow with cardiology  Encourage out of bed and ambulation and physical therapy as tolerated    ----------------------------------------------------------------------------------------------------------------------    HPI/Interval History:   Patient has history of COPD and chronic hypoxic respiratory failure on 4 L of oxygen, recently she had COVID-19 infection but improving.  Also she has chronic systolic and diastolic CHF.  Recently she had NSTEMI and cardiac catheterization with no intervention but she had triple-vessel disease, she declined CABG, she was discharged on LifeVest due to low ejection fraction of 35%.  Patient does not like to use inhalers and finally was convinced to use ICS/LABA, she has history of thrush that was very bad as per patient.  Currently she feels fine denies significant cough or sputum production, denies wheezing, denies chest pain.  She is wearing her LifeVest.  Denies dysphagia.    Vitals:   Reviewed vital signs in chart at nursing facility, stable    Physical Exam:   Gen: Alert and without respiratory distress  HEENT: PERRL. O/P: clear. no erythema or exudate.  Neck: Supple. There is no JVD, no LAD or thyromegaly appreciated.  "Trachea is midline.  Chest: Equal breath sounds and clear to auscultation bilaterally, no wheezing or crackles  Cardiac: S1-S2 regular, no murmurs  Abdomen: Soft and nontender. Bowel sounds are present.  Extremities: No edema  Neuro:  Awake alert and oriented, no focal deficits      Pertinent studies:    Echocardiogram: LVEF 35%, grade 1 diastolic dysfunction, normal RV, estimated peak PA pressure 40    Cardiac catheterization February 2024: Three-vessel coronary artery disease mildly elevated LVEDP, patent RCA stent, no intervention      Hayder Barton MD    Portions of the record may have been created with voice recognition software. Occasional wrong word or \"sound a like\" substitutions may have occurred due to the inherent limitations of voice recognition software. Read the chart carefully and recognize, using context, where substitutions have occurred.  "

## 2024-03-27 NOTE — PROGRESS NOTES
[facility] [address] [address] [phone]                                               Cardiology Nursing Home Visit - Follow up  Lauren Quintero, 78 y.o. female  YOB: 1945  MRN: [id number] Encounter: [id number]      PCP - Starla Bateman MD  Outpatient cardiologist - will be [facility]    No chief complaint on file.      Vitals:      There were no vitals filed for this visit.  BMI - There is no height or weight on file to calculate BMI.  Wt Readings from Last 7 Encounters:   03/25/24 60.1 kg (132 lb 9.6 oz)   03/22/24 60.1 kg (132 lb 9.6 oz)   03/18/24 60.1 kg (132 lb 8 oz)   03/14/24 60 kg (132 lb 3.2 oz)   03/11/24 60 kg (132 lb 3.2 oz)   03/10/24 60 kg (132 lb 3.2 oz)   02/18/24 59.9 kg (132 lb)       Assessment  Cardiomyopathy, LVEF 35%  Chronic combined heart failure  Multivessel coronary disease  Hypertension  COPD  Anemia  S/p 1 U PRBC transfusion  Anxiety    Plan  Cardiomyopathy, chronic combined heart failure  No current symptoms of shortness of breath, chest pain  Clinically appears near euvolemic  Currently on losartan 25 mg twice daily, metoprolol XL 50 mg twice daily seems to be doing reasonably well  Creatinine remained stable at 0.8  Plan  Recommend switching losartan to Entresto 24/26 mg twice daily & uptitrate as tolerated  Recheck BMP in 1 week  Change Lasix 20 mg daily to 20 mg PRN for weight gain > 3 lbs after switching to Entresto and monitor weight  She has LifeVest in place, but initially had concerns about finding it uncomfortable to use  I counseled her that this is just to protect her against sudden death from tachyarrhythmia and we mainly plan to use it for the short course until we determine mine if her LV function improves or not with medical therapy  She is okay to continue using it for now  Plan to check 3-month follow-up echocardiogram in May 2024 to decide on need for ICD    Multivessel coronary artery  disease  Left heart cath results reviewed   pLAD 80%, mLAD 90%, mLCx 80%, OM1 75%, mRCA 70%  She was recommended CABG evaluation, but she herself is not interested in surgical treatment   She has been managed medically since then and she continues to do well without any chest pain  Continue aspirin, metoprolol succinate 50 mg twice daily, pravastatin    Hypertension  Blood pressure well-controlled  Currently on metoprolol, losartan, Lasix  Losartan, Lasix being switched to Entresto 24/26 mg twice daily  Plan to uptitrate further thereafter    No results found for this visit on 03/27/24.    No orders of the defined types were placed in this encounter.    Return in about 2 weeks (around 4/10/2024), or if symptoms worsen or fail to improve.      History of Present Illness   78 y.o. female is currently admitted to the [facility] after recent discharge from [facility] on 3/7/24.  We are currently consulted to evaluate and assist with management of her cardiac problems.     In mid February 2024 she had presented to [facility] with complaints of shortness of breath and increased oxygen demand and was found to have COVID infection.  She was treated for COVID sepsis during this, and subsequently was also discovered to have new onset cardiomyopathy and heart failure with an EF of 35%.  As part of her evaluation for this she underwent cardiac catheterization which then revealed multivessel coronary artery disease.  She was started on heart failure therapy and recommended outpatient follow-up with CT surgery to discuss CABG, and was fitted with a LifeVest to help protect her in the interim.      She has since been in rehab here at [facility], and has been seen on a weekly basis by our cardiology team here, due to need for modifications to her medical therapy.  She previously used to be on carvedilol, but had issues with low blood pressure and was eventually transitioned to  metoprolol and losartan recently.    At present she denies any chest pain or palpitations.  She does report having intermittent episodic symptoms of shortness of breath, which are nonexertional.  No complaints of orthopnea or PND, or pedal edema.      Historical Information   Past Medical History:   Diagnosis Date    Acute congestive heart failure (HCC) 08/27/2021    Anxiety     Cardiac disease     Chest pain 08/27/2021    Chronic pain disorder     COPD (chronic obstructive pulmonary disease) ([facility])     COVID-19 02/15/2024    Depression     Heart disease     Hyperlipidemia     Hypertension     MI (myocardial infarction) ([facility])     MRSA (methicillin resistant Staphylococcus aureus)     Renal disorder     benign kidney tumor     Past Surgical History:   Procedure Laterality Date    APPENDECTOMY      CARDIAC CATHETERIZATION N/A 2/21/2024    Procedure: Cardiac catheterization;  Surgeon: Luke Malagon MD;  Location: [facility];  Service: Cardiology    CARDIAC CATHETERIZATION N/A 2/21/2024    Procedure: Cardiac Coronary Angiogram;  Surgeon: Luke Malagon MD;  Location: [facility];  Service: Cardiology    CARDIAC CATHETERIZATION Left 2/21/2024    Procedure: Cardiac Left Heart Cath;  Surgeon: Luek Malagon MD;  Location: [facility];  Service: Cardiology    ELBOW BURSA SURGERY Left 02/2018    D/T MRSA INFECTION    IR THORACENTESIS  2/27/2024    SPINAL FUSION      L7 L9     Family History   Problem Relation Age of Onset    Heart disease Mother     Cancer Father      Current Outpatient Medications on File Prior to Visit   Medication Sig Dispense Refill    albuterol (VENTOLIN HFA) 90 mcg/act inhaler Inhale 2 puffs every 6 (six) hours as needed for wheezing 1 Inhaler 0    aspirin (ECOTRIN LOW STRENGTH) 81 mg EC tablet Take 1 tablet (81 mg total) by mouth daily 30 tablet 0    buPROPion (WELLBUTRIN) 75 mg tablet Take 75 mg by mouth in the morning      Calcium-Magnesium-Vitamin D (CALCIUM 500  PO) Take 1,000 mg by mouth daily      cariprazine (Vraylar) 1.5 MG capsule Take 1.5 mg by mouth daily      carvedilol (COREG) 25 mg tablet Take 25 mg by mouth 2 (two) times a day with meals      furosemide (LASIX) 20 mg tablet Take 1 tablet (20 mg total) by mouth daily 30 tablet 0    hydrOXYzine HCL (ATARAX) 25 mg tablet Take 1 tablet (25 mg total) by mouth every 6 (six) hours as needed for anxiety  0    ipratropium (ATROVENT) 0.02 % nebulizer solution Take 2.5 mL (0.5 mg total) by nebulization 3 (three) times a day 225 mL 0    lidocaine (LIDODERM) 5 % Apply 2 patches topically over 12 hours daily for 10 days Remove & Discard patch within 12 hours or as directed by MD 20 patch 0    meclizine (ANTIVERT) 25 mg tablet Take 1 tablet (25 mg total) by mouth every 8 (eight) hours as needed for dizziness for up to 7 days 10 tablet 0    melatonin 3 mg Take 1 tablet (3 mg total) by mouth daily at bedtime as needed (insomnia) 30 tablet 0    midodrine (PROAMATINE) 2.5 mg tablet Take 1 tablet (2.5 mg total) by mouth 3 (three) times a day before meals 90 tablet 0    nitroglycerin (NITROSTAT) 0.4 mg SL tablet Place 0.4 mg under the tongue every 5 (five) minutes as needed for chest pain      pantoprazole (PROTONIX) 40 mg tablet Take 1 tablet (40 mg total) by mouth daily 30 tablet 0    pravastatin (PRAVACHOL) 80 mg tablet Take 1 tablet (80 mg total) by mouth every evening 30 tablet 0    vilazodone (VIIBRYD) 40 mg tablet Take 40 mg by mouth daily with breakfast      Zanubrutinib 80 MG CAPS Take 160 mg by mouth 2 (two) times a day       No current facility-administered medications on file prior to visit.     Allergies   Allergen Reactions    Sulfa Antibiotics Anaphylaxis    Iron GI Intolerance    Niacin     Statins Other (See Comments)     cramps     Social History     Socioeconomic History    Marital status:      Spouse name: None    Number of children: 3    Years of education: 13    Highest education level: High school  graduate   Occupational History    Occupation:      Employer: [employer]     Comment: retired    Tobacco Use    Smoking status: Former     Current packs/day: 0.00     Average packs/day: 1 pack/day for 60.0 years (60.0 ttl pk-yrs)     Types: Cigarettes     Start date:      Quit date: 2016     Years since quittin.2    Smokeless tobacco: Never    Tobacco comments:     She has close to a 60 pack-year smoking history, most recently has cut down to 4-5 cigarettes daily   Vaping Use    Vaping status: Never Used   Substance and Sexual Activity    Alcohol use: Never    Drug use: No    Sexual activity: Not Currently   Other Topics Concern    None   Social History Narrative    None     Social Determinants of Health     Financial Resource Strain: Patient Declined (2024)    Received from [facility] [employer]    Overall Financial Resource Strain (CARDIA)     Difficulty of Paying Living Expenses: Patient declined   Food Insecurity: No Food Insecurity (2024)    Hunger Vital Sign     Worried About Running Out of Food in the Last Year: Never true     Ran Out of Food in the Last Year: Never true   Transportation Needs: No Transportation Needs (2024)    PRAPARE - Transportation     Lack of Transportation (Medical): No     Lack of Transportation (Non-Medical): No   Physical Activity: Patient Declined (2024)    Received from [facility] [employer]    Exercise Vital Sign     Days of Exercise per Week: Patient declined     Minutes of Exercise per Session: Patient declined   Stress: Patient Declined (2024)    Received from [facility]    [employer] [place] [employer] - Occupational Stress Questionnaire     Feeling of Stress : Patient declined   Social Connections: Patient Declined (2024)    Received from [facility] [employer]    Social Connection and Isolation Panel [NHANES]     Frequency of Communication with Friends and Family: Patient declined     Frequency of Social  Gatherings with Friends and Family: Patient declined     Attends Restoration Services: Patient declined     Active Member of Clubs or Organizations: Patient declined     Attends Club or Organization Meetings: Patient declined     Marital Status: Patient declined   Intimate Partner Violence: Patient Declined (1/6/2024)    Received from [facility]    Humiliation, Afraid, Rape, and Kick questionnaire     Fear of Current or Ex-Partner: Patient declined     Emotionally Abused: Patient declined     Physically Abused: Patient declined     Sexually Abused: Patient declined   Housing Stability: Low Risk  (2/19/2024)    Housing Stability Vital Sign     Unable to Pay for Housing in the Last Year: No     Number of Places Lived in the Last Year: 2     Unstable Housing in the Last Year: No        Review of Systems   All other systems reviewed and are negative.        Physical Exam  Vitals and nursing note reviewed.   Constitutional:       General: She is not in acute distress.     Appearance: Normal appearance. She is well-developed. She is not ill-appearing.   HENT:      Head: Normocephalic and atraumatic.      Nose: No congestion.   Eyes:      General: No scleral icterus.     Conjunctiva/sclera: Conjunctivae normal.   Neck:      Vascular: No carotid bruit or JVD.   Cardiovascular:      Rate and Rhythm: Normal rate and regular rhythm.      Pulses: Normal pulses.      Heart sounds: Normal heart sounds. No murmur heard.     No friction rub. No gallop.      Comments: LifeVest noted in place  Pulmonary:      Effort: Pulmonary effort is normal. No respiratory distress.      Breath sounds: Normal breath sounds. No rales.   Abdominal:      General: There is no distension.      Palpations: Abdomen is soft.      Tenderness: There is no abdominal tenderness.   Musculoskeletal:         General: No swelling or tenderness.      Cervical back: Neck supple.      Right lower leg: No edema.      Left lower leg: No edema.   Skin:     General:  "Skin is warm.   Neurological:      General: No focal deficit present.      Mental Status: She is alert and oriented to person, place, and time. Mental status is at baseline.   Psychiatric:         Attention and Perception: Attention normal.         Mood and Affect: Mood is anxious.         Speech: Speech normal.         Behavior: Behavior normal. Behavior is cooperative.         Thought Content: Thought content normal.         Cognition and Memory: Cognition normal.           Labs:    CBC:   Lab Results   Component Value Date    WBC 5.63 03/19/2024    RBC 2.73 (L) 03/19/2024    HGB 8.1 (L) 03/19/2024    HCT 27.1 (L) 03/19/2024    MCV 99 (H) 03/19/2024     03/19/2024    RDW 14.9 03/19/2024       CMP:   Lab Results   Component Value Date    K 4.0 03/07/2024     03/07/2024    CO2 34 (H) 03/07/2024    BUN 24 03/07/2024    CREATININE 0.77 03/07/2024    EGFR 74 03/07/2024    GLUCOSE 92 08/14/2022    CALCIUM 8.7 03/07/2024    AST 12 (L) 02/29/2024    ALT 6 (L) 02/29/2024    ALKPHOS 69 02/29/2024       Magnesium:  Lab Results   Component Value Date    MG 1.8 (L) 03/07/2024       Lipid Profile:   Lab Results   Component Value Date    HDL 55 02/16/2024    TRIG 143 02/16/2024    LDLCALC 139 (H) 02/16/2024       Thyroid Studies:   Lab Results   Component Value Date    TYJ4YBIEUJVI 0.421 (L) 04/30/2023    T3FREE 2.12 (L) 09/21/2020       A1c:  No components found for: \"HGA1C\"    INR:  Lab Results   Component Value Date    INR 1.03 02/15/2024    INR 0.95 12/12/2023    INR 1.01 10/28/2023   5    Imaging: XR chest portable    Result Date: 3/4/2024  Narrative: XR CHEST PORTABLE INDICATION: follow up pneumonia. COMPARISON: February 26, 2024 FINDINGS: Right middle and lower lobe consolidation noted. No pneumothorax or pleural effusion. Normal cardiomediastinal silhouette. Bones are unremarkable for age. Normal upper abdomen.     Impression: Stable appearance of consolidation in the right middle and lower lobes likely " representing pneumonia. Workstation performed: [id number]     FL barium swallow video w speech    Result Date: 2/29/2024  Narrative: A video barium swallow study was performed by the Department of Speech Pathology. Please refer to the report for the official interpretation. The images are stored for archival purposes only. Study images were not formally reviewed by the Radiology Department.    IR IN-Patient Thoracentesis    Result Date: 2/28/2024  Narrative: PROCEDURE: Limited ultrasound to evaluate for pleural effusion NUMBER OF IMAGES: 2 INDICATION: Concern for pleural effusion. PROCEDURE: Limited right thorax ultrasound was performed to evaluate for pleural effusion. FINDINGS: Limited right thorax ultrasound failed to identify a significant pleural fluid collection suitable for aspiration.     Impression: Limited ultrasound demonstrating absence of significant pleural effusion suitable for aspiration. Procedure not performed. Signed, performed, and dictated by TAMMIE Puente  under the supervision of Dr. Rosenberg Workstation performed: [id number]     CTA chest (pe study) abdomen pelvis contrast    Result Date: 2/28/2024  Narrative: CT PULMONARY ANGIOGRAM OF THE CHEST AND CT ABDOMEN AND PELVIS WITH INTRAVENOUS CONTRAST INDICATION: Severe RUQ/R pleuritic chest pain radiating to shoulder blade ro PE but also upper abdominal pathology. COMPARISON: None. TECHNIQUE: CT examination of the chest, abdomen and pelvis was performed. Thin section CT angiographic technique was used in the chest in order to evaluate for pulmonary embolus and coronal 3D MIP postprocessing was performed on the acquisition scanner. Multiplanar 2D reformatted images were created from the source data. This examination, like all CT scans performed in the [facility], was performed utilizing techniques to minimize radiation dose exposure, including the use of iterative reconstruction and automated exposure control. Radiation  dose length product (DLP) for this visit: 1026 mGy-cm IV Contrast: 100 mL of iohexol (OMNIPAQUE) Enteric Contrast: Not administered. FINDINGS: CHEST PULMONARY ARTERIAL TREE: No pulmonary embolus is seen. LUNGS: Consolidation/atelectasis seen right lower lobe with volume loss. The right lower lobe bronchus is obstructed/opacified Patchy consolidation left upper lobe with a few centrilobular nodules and branching tree in bud opacity, image 63 series 603 Patchy consolidation in the posterior aspect of the right upper lobe with branching tree-in-bud opacities, centrilobular nodules, image 101 series 2 mild patchy opacity left lung base PLEURA: Unremarkable. HEART/AORTA: Ascending aorta measures 3.6 cm. Coronary artery calcification seen atherosclerotic changes seen within the aorta Evaluation of the aorta limited due to streak artifact MEDIASTINUM AND ARABELLA: Lower right paratracheal, left paratracheal lymph nodes seen, do not meet the criteria for pathologic enlargement CHEST WALL AND LOWER NECK: Unremarkable. ABDOMEN LIVER/BILIARY TREE: Mildly nodular liver GALLBLADDER: No calcified gallstones. No pericholecystic inflammatory change. SPLEEN: Unremarkable. PANCREAS: Unremarkable. ADRENAL GLANDS: Unremarkable. KIDNEYS/URETERS: Right renal cyst seen. Left renal cyst measuring 2.4 cm STOMACH AND BOWEL: Diverticulosis seen There is normal abnormal dilatation of the small bowel loops seen Fluid-filled small bowel loops seen APPENDIX: No findings to suggest appendicitis. ABDOMINOPELVIC CAVITY: No ascites. No pneumoperitoneum. No lymphadenopathy. VESSELS: Celiac trunk appears unremarkable iliac vessels appear unremarkable PELVIS REPRODUCTIVE ORGANS: Right adnexal cyst seen measuring 3.2 cm URINARY BLADDER: Unremarkable. ABDOMINAL WALL/INGUINAL REGIONS: Unremarkable. BONES: Chronic wedge compression deformity of the L4 vertebra superior endplate depression of the L2 vertebra, new from the previous study of October 28, 2023  Chronic compression of the L1, T12 Changes of vertebroplasty Compression deformity of the T4 vertebra with a central depression of the superior endplate, unchanged    Impression: No pulmonary embolism Large area of consolidation/atelectasis right lower lung With opacification of the right lower lobe bronchus probably due to secretions/mucous plug. Consider pulmonary evaluation There are scattered multifocal areas of centrilobular nodular densities, branching: Bilateral opacities and mild consolidations in the both upper lobes, posterior segment right upper lobe, suggesting pneumonia,/aspiration Superior endplate depression of the L2 vertebral suggest osteoporotic compression, new since the previous CT October 28, 2023 Mild superior endplate depression of the T4 vertebra with 40% loss of vertebral height age-indeterminate probably acute compression Follow-up suggested at 3 months to demonstrate resolution Workstation performed: [id number]     US right upper quadrant    Result Date: 2/28/2024  Narrative: RIGHT UPPER QUADRANT ULTRASOUND INDICATION: Worsening RUQ pain ro acute foreign. COMPARISON: No prior right upper quadrant ultrasound available for comparison. Correlation with CT abdomen pelvis October 28, 2023 TECHNIQUE: Real-time ultrasound of the right upper quadrant was performed with a curvilinear transducer with both volumetric sweeps and still imaging techniques. FINDINGS: PANCREAS: Visualized portions of the pancreas are within normal limits. AORTA AND IVC: Visualized portions are normal for patient age. LIVER: Size: Within normal range. The liver measures 19.5 cm in the midclavicular line. Contour: Surface contour is smooth. Parenchyma: Echogenicity and echotexture are within normal limits. No liver mass identified. Limited imaging of the main portal vein shows it to be patent and hepatopetal. BILIARY: The gallbladder is normal in caliber. No wall thickening or pericholecystic fluid. No stones or sludge  identified. No sonographic Wen's sign. No intrahepatic biliary dilatation. CBD measures 5.0 mm. No choledocholithiasis. KIDNEY: Right kidney measures 9.1 x 3.6 x 5.4 cm. Volume 92.6 mL 1.1 x 0.8 x 1.1 cm simple cyst. No shadowing calculi. No hydronephrosis. ASCITES: None.     Impression: No cholelithiasis or acute cholecystitis. No acute pathology in the right upper quadrant of the abdomen. Workstation performed: [id number]       Cardiac testing:   Results for orders placed during the hospital encounter of 21    Echo complete with contrast if indicated    Narrative  [address] [address]  [phone]    Transthoracic Echocardiogram  2D, M-mode, Doppler, and Color Doppler    Study date:  27-Aug-2021    Patient: NICOLE HANNA  MR number: [id number]  Account number: [id number]  : 1945  Age: 75 years  Gender: Female  Status: Inpatient  Location: Emergency room  Height: 67 in  Weight: 103 lb  BP: 108/ 64 mmHg    Indications: Heart failure    Diagnoses: I50.9 - Heart failure, unspecified    Sonographer:  Naresh Noriega RDCS  Referring Physician:  Stephanie Day PA-C  Group:  [facility]  Interpreting Physician:  Velma St MD    SUMMARY    LEFT VENTRICLE:  Systolic function was normal. Ejection fraction was estimated to be 60 %.  There were no regional wall motion abnormalities.  There was mild concentric hypertrophy.  Doppler parameters were consistent with abnormal left ventricular relaxation (grade 1 diastolic dysfunction).    RIGHT VENTRICLE:  The size was normal.  Systolic function was normal.    LEFT ATRIUM:  The atrium was mildly dilated.    MITRAL VALVE:  There was mild to moderate annular calcification.  There was trace to mild regurgitation.    TRICUSPID VALVE:  There was trace to mild regurgitation.    IVC, HEPATIC VEINS:  The inferior vena cava was dilated.  The respirophasic change in diameter was more  than 50%.    PERICARDIUM:  A small pericardial effusion was identified. There was no evidence of hemodynamic compromise.    HISTORY: PRIOR HISTORY: COPD; Hypertension; Hyperlipidemia; MI; Congestive heart failure; SOB; Chest pain; Smoker; CAD; Sepsis; Pneumonia    PROCEDURE: The procedure was performed in the emergency room. This was a routine study. The transthoracic approach was used. The study included complete 2D imaging, M-mode, complete spectral Doppler, and color Doppler. The heart rate was  95 bpm, at the start of the study. Images were obtained from the parasternal, apical, subcostal, and suprasternal notch acoustic windows. Echocardiographic views were limited due to lung interference. Image quality was adequate.    LEFT VENTRICLE: Size was normal. Systolic function was normal. Ejection fraction was estimated to be 60 %. There were no regional wall motion abnormalities. There was mild concentric hypertrophy. No evidence of apical thrombus. DOPPLER:  Doppler parameters were consistent with abnormal left ventricular relaxation (grade 1 diastolic dysfunction).    VENTRICULAR SEPTUM: There was mild sigmoid septal appearance.    RIGHT VENTRICLE: The size was normal. Systolic function was normal. Wall thickness was normal.    LEFT ATRIUM: The atrium was mildly dilated.    RIGHT ATRIUM: Size was normal.    MITRAL VALVE: There was mild to moderate annular calcification. There was normal leaflet separation. DOPPLER: The transmitral velocity was within the normal range. There was no evidence for stenosis. There was trace to mild regurgitation.    AORTIC VALVE: The valve was trileaflet. Leaflets exhibited normal thickness and normal cuspal separation. DOPPLER: Transaortic velocity was within the normal range. There was no evidence for stenosis. There was no significant  regurgitation.    TRICUSPID VALVE: The valve structure was normal. There was normal leaflet separation. DOPPLER: The transtricuspid velocity was  within the normal range. There was no evidence for stenosis. There was trace to mild regurgitation.    PULMONIC VALVE: Leaflets exhibited normal thickness, no calcification, and normal cuspal separation. DOPPLER: The transpulmonic velocity was within the normal range. There was no significant regurgitation.    PERICARDIUM: A small pericardial effusion was identified. There was no evidence of hemodynamic compromise. The pericardium was normal in appearance.    AORTA: The root exhibited normal size.    SYSTEMIC VEINS: IVC: The inferior vena cava was dilated. The respirophasic change in diameter was more than 50%.    SYSTEM MEASUREMENT TABLES    2D  %FS: 26.5 %  Ao Diam: 3.2 cm  Ao asc: 3.3 cm  EDV(Teich): 66.3 ml  EF(Teich): 52.5 %  ESV(Teich): 31.5 ml  IVSd: 1.1 cm  LA Area: 21.8 cm2  LA Diam: 3.5 cm  LVEDV MOD A4C: 95.7 ml  LVEF MOD A4C: 57.4 %  LVESV MOD A4C: 40.7 ml  LVIDd: 3.9 cm  LVIDs: 2.9 cm  LVLd A4C: 7.7 cm  LVLs A4C: 6.4 cm  LVPWd: 0.9 cm  RA Area: 12.7 cm2  RVIDd: 3.4 cm  SV MOD A4C: 55 ml  SV(Teich): 34.8 ml    CW  TR Vmax: 2.8 m/s  TR maxP.6 mmHg    MM  TAPSE: 2.1 cm    PW  E' Sept: 0.1 m/s  E/E' Sept: 13.7  MV A Jacobo: 1.5 m/s  MV Dec [place]: 5.4 m/s2  MV DecT: 237 ms  MV E Jacobo: 1.3 m/s  MV E/A Ratio: 0.9  MV PHT: 68.7 ms  MVA By PHT: 3.2 cm2    [employer] Accredited Echocardiography Laboratory    Prepared and electronically signed by    Velma St MD  Signed 27-Aug-2021 13:44:21    No results found for this or any previous visit.    No results found for this or any previous visit.    No results found for this or any previous visit.      XR chest portable  Narrative: XR CHEST PORTABLE    INDICATION: follow up pneumonia.    COMPARISON: 2024    FINDINGS:    Right middle and lower lobe consolidation noted. No pneumothorax or pleural effusion.    Normal cardiomediastinal silhouette.    Bones are unremarkable for age.    Normal upper abdomen.  Impression: Stable appearance  of consolidation in the right middle and lower lobes likely representing pneumonia.    Workstation performed: [id number]

## 2024-03-28 ENCOUNTER — NURSING HOME VISIT (OUTPATIENT)
Dept: PULMONOLOGY | Facility: SKILLED NURSING FACILITY | Age: 79
End: 2024-03-28
Payer: MEDICARE

## 2024-03-28 DIAGNOSIS — I50.42 CHRONIC COMBINED SYSTOLIC AND DIASTOLIC CHF (CONGESTIVE HEART FAILURE) (HCC): Primary | ICD-10-CM

## 2024-03-28 DIAGNOSIS — J96.22 ACUTE ON CHRONIC RESPIRATORY FAILURE WITH HYPOXIA AND HYPERCAPNIA (HCC): ICD-10-CM

## 2024-03-28 DIAGNOSIS — J44.9 CHRONIC OBSTRUCTIVE PULMONARY DISEASE, UNSPECIFIED COPD TYPE (HCC): ICD-10-CM

## 2024-03-28 DIAGNOSIS — J96.21 ACUTE ON CHRONIC RESPIRATORY FAILURE WITH HYPOXIA AND HYPERCAPNIA (HCC): ICD-10-CM

## 2024-03-28 DIAGNOSIS — Z72.0 TOBACCO ABUSE: ICD-10-CM

## 2024-03-28 PROCEDURE — 99308 SBSQ NF CARE LOW MDM 20: CPT | Performed by: INTERNAL MEDICINE

## 2024-03-28 NOTE — PROGRESS NOTES
[facility]  [address]   [address]  [place], [address] [phone]    Progress Note  Code SNF 31    Patient Location     [facility]    Reason for visit     Patient’s care was coordinated with nursing facility staff. Recent vitals, labs and [facility]     Problem List Items Addressed This Visit          Respiratory    COPD (chronic obstructive pulmonary disease) (HCC) - Primary     Patient was noted to have increased chest congestion with coughing episodes lately.  Chest x-ray revealed very mild patchy pulmonary fibrosis.  Patient has history of recent pneumonia and COVID-19 infection.  Possible postinfectious cough.  She was started on prednisone 20 mg twice daily for 5 days.  Reports some improvement.  Continue same.  Continue current nebulizer treatments                  Acute on chronic respiratory failure with hypoxia:  Patient was recently hospitalized with acute hypoxic respiratory failure due of COPD exacerbation, COVID-19 infection with superimposed pneumonia.S/P remdesivir antibiotic and steroids treatment.     Non-ST elevation MI:  Patient was recently diagnosed with NSEMI. Cardiac cath revealed significant triple-vessel disease.  Outpatient follow-up with CT surgeon for potential CABG was recommended once patient recovers from acute illness.  She was also diagnosed with Takotsubo cardiomyopathy on recent cath.  EF was 35%.  Patient was discharged on LifeVest.  Pt has decided against CABG. Care coordinated with cardiology service.  Medical therapy to be maximized.  Losartan will be switched to Entresto.  Lasix to be changed to as needed for weight gain. Plans for repeat echo noted in May.  Hopefully LifeVest can be removed if EF reveals improvement.      Essential hypertension:  BP stable on  Toprol-XL 50 mg twice daily and losartan 25 mg twice daily.  Monitor blood pressure closely.  Losartan is  being changed to Entresto today      COVID-19 infection:  Patient presented to the hospital with acute hypoxic respiratory failure attributed to COPD exacerbation pneumonia and COVID-19 infection.  CT chest showed scattered areas of tree-in-bud opacity consistent with endobronchial impaction/infection.  She was treated with remdesivir, steroid and antibiotic .  Pt remains afebrile with stable Sao2    Waldenström macroglobinemia:  Pt remains  zanubrutinib 160 mg twice a day.  Follow-up with oncology service    Depression:  Pt remains on  Vraylar, Vilzodone, Bupropion and PRN Hydroxyzine        HPI       Patient is being seen for a follow-up visit today.  She was noted to have increased chest congestion with coughing spells few days ago.  Patient stated she felt like she was developing pneumonia.  Chest x-ray was negative for consolidation or CHF.  Patient was started on prednisone 20 mg twice daily for 5 days.  Reports feeling slightly better.  SaO2 remains stable on oxygen    Review of Systems   Constitutional:  Negative for chills and fever.   Respiratory:  Positive for cough (improved) and shortness of breath (with actvities). Negative for wheezing and stridor.    Cardiovascular:  Negative for chest pain and leg swelling.   Gastrointestinal:  Negative for abdominal distention, abdominal pain and vomiting.   Genitourinary:  Negative for flank pain and hematuria.   Musculoskeletal:  Positive for back pain and gait problem.   Neurological:  Positive for weakness. Negative for seizures and syncope.   Psychiatric/Behavioral:  Negative for agitation and behavioral problems.        Past Medical History:   Diagnosis Date    Acute congestive heart failure (HCC) 08/27/2021    Anxiety     Cardiac disease     Chest pain 08/27/2021    Chronic pain disorder     COPD (chronic obstructive pulmonary disease) (HCC)     COVID-19 02/15/2024    Depression     Heart disease     Hyperlipidemia     Hypertension     MI (myocardial  infarction) (HCC)     MRSA (methicillin resistant Staphylococcus aureus)     Renal disorder     benign kidney tumor       Past Surgical History:   Procedure Laterality Date    APPENDECTOMY      CARDIAC CATHETERIZATION N/A 2024    Procedure: Cardiac catheterization;  Surgeon: Luke Malagon MD;  [facility];  Service: Cardiology    CARDIAC CATHETERIZATION N/A 2024    Procedure: Cardiac Coronary Angiogram;  Surgeon: Luke Malagon MD;  [facility];  Service: Cardiology    CARDIAC CATHETERIZATION Left 2024    Procedure: Cardiac Left Heart Cath;  Surgeon: Luke Malagon MD;  [facility];  Service: Cardiology    ELBOW BURSA SURGERY Left 2018    D/T MRSA INFECTION    IR THORACENTESIS  2024    SPINAL FUSION      L7 L9       Social History     Tobacco Use   Smoking Status Former    Current packs/day: 0.00    Average packs/day: 1 pack/day for 60.0 years (60.0 ttl pk-yrs)    Types: Cigarettes    Start date:     Quit date:     Years since quittin.2   Smokeless Tobacco Never   Tobacco Comments    She has close to a 60 pack-year smoking history, most recently has cut down to 4-5 cigarettes daily       Family History   Problem Relation Age of Onset    Heart disease Mother     Cancer Father         Allergies   Allergen Reactions    Sulfa Antibiotics Anaphylaxis    Iron GI Intolerance    Niacin     Statins Other (See Comments)     cramps         Current Outpatient Medications:     sacubitril-valsartan (Entresto) 24-26 MG TABS, Take 1 tablet by mouth 2 (two) times a day, Disp: , Rfl:     albuterol (VENTOLIN HFA) 90 mcg/act inhaler, Inhale 2 puffs every 6 (six) hours as needed for wheezing, Disp: 1 Inhaler, Rfl: 0    aspirin (ECOTRIN LOW STRENGTH) 81 mg EC tablet, Take 1 tablet (81 mg total) by mouth daily, Disp: 30 tablet, Rfl: 0    buPROPion (WELLBUTRIN) 75 mg tablet, Take 75 mg by mouth in the morning, Disp: , Rfl:      Calcium-Magnesium-Vitamin D (CALCIUM 500 PO), Take 1,000 mg by mouth daily, Disp: , Rfl:     cariprazine (Vraylar) 1.5 MG capsule, Take 1.5 mg by mouth daily, Disp: , Rfl:     carvedilol (COREG) 25 mg tablet, Take 25 mg by mouth 2 (two) times a day with meals, Disp: , Rfl:     hydrOXYzine HCL (ATARAX) 25 mg tablet, Take 1 tablet (25 mg total) by mouth every 6 (six) hours as needed for anxiety, Disp: , Rfl: 0    ipratropium (ATROVENT) 0.02 % nebulizer solution, Take 2.5 mL (0.5 mg total) by nebulization 3 (three) times a day, Disp: 225 mL, Rfl: 0    lidocaine (LIDODERM) 5 %, Apply 2 patches topically over 12 hours daily for 10 days Remove & Discard patch within 12 hours or as directed by MD, Disp: 20 patch, Rfl: 0    meclizine (ANTIVERT) 25 mg tablet, Take 1 tablet (25 mg total) by mouth every 8 (eight) hours as needed for dizziness for up to 7 days, Disp: 10 tablet, Rfl: 0    melatonin 3 mg, Take 1 tablet (3 mg total) by mouth daily at bedtime as needed (insomnia), Disp: 30 tablet, Rfl: 0    midodrine (PROAMATINE) 2.5 mg tablet, Take 1 tablet (2.5 mg total) by mouth 3 (three) times a day before meals, Disp: 90 tablet, Rfl: 0    nitroglycerin (NITROSTAT) 0.4 mg SL tablet, Place 0.4 mg under the tongue every 5 (five) minutes as needed for chest pain, Disp: , Rfl:     pantoprazole (PROTONIX) 40 mg tablet, Take 1 tablet (40 mg total) by mouth daily, Disp: 30 tablet, Rfl: 0    pravastatin (PRAVACHOL) 80 mg tablet, Take 1 tablet (80 mg total) by mouth every evening, Disp: 30 tablet, Rfl: 0    vilazodone (VIIBRYD) 40 mg tablet, Take 40 mg by mouth daily with breakfast, Disp: , Rfl:     Zanubrutinib 80 MG CAPS, Take 160 mg by mouth 2 (two) times a day, Disp: , Rfl:     Medications that were added today include prednisone 40 mg for 2 days, 30 mg for 2 days, 20 mg for 2 days then 10 mg for 2 days  Guaifenesin 600 mg twice daily    Updated list was reviewed in [facility] of facility.     There were no vitals filed  "for this visit.          Physical Exam  Constitutional:       General: She is not in acute distress.  HENT:      Head: Normocephalic and atraumatic.   Eyes:      General: No scleral icterus.  Cardiovascular:      Rate and Rhythm: Normal rate and regular rhythm.   Pulmonary:      Breath sounds: No stridor. No wheezing or rhonchi.      Comments: Pt on Oxygen  Decreased breath sounds bilaterally    Abdominal:      General: There is no distension.      Palpations: Abdomen is soft.      Tenderness: There is no abdominal tenderness. There is no guarding.   Musculoskeletal:      Cervical back: Neck supple.      Right lower leg: No edema.      Left lower leg: No edema.   Skin:     Coloration: Skin is not jaundiced.      Comments: Ecchymosis involving bilateral upper extremities   Neurological:      General: No focal deficit present.      Cranial Nerves: No cranial nerve deficit.      Motor: Weakness present.   Psychiatric:         Mood and Affect: Mood normal.         Behavior: Behavior normal.         Diagnostic Data:  Labs from 3/11 revealed  HGB down to 7.8,   3/18 B12, Folate ok, iron 57        Lab Results   Component Value Date    WBC 5.63 03/19/2024    HGB 8.1 (L) 03/19/2024    HCT 27.1 (L) 03/19/2024    MCV 99 (H) 03/19/2024     03/19/2024      Lab Results   Component Value Date    SODIUM 140 03/07/2024    K 4.0 03/07/2024     03/07/2024    CO2 34 (H) 03/07/2024    BUN 24 03/07/2024    CREATININE 0.77 03/07/2024    GLUC 106 03/07/2024    CALCIUM 8.7 03/07/2024      Additional Notes:    Repeat labs in a.m.  Check chest x-ray  Add tapering doses of steroids and guaifenesin 600 mg twice daily  Request pulmonary follow-up      Portions of the record may have been created with voice recognition software.  Occasional wrong word or \"sound a like\" substitutions may have occurred due to the inherent limitations of voice recognition software.  Read the chart carefully and recognize, using context, where " substitutions have occurred.    This note was electronically signed by Dr. Annamarie Muhammad

## 2024-03-28 NOTE — ASSESSMENT & PLAN NOTE
Patient was noted to have increased chest congestion with coughing episodes lately.  Chest x-ray revealed very mild patchy pulmonary fibrosis.  Patient has history of recent pneumonia and COVID-19 infection.  Possible postinfectious cough.  She was started on prednisone 20 mg twice daily for 5 days.  Reports some improvement.  Continue same.  Continue current nebulizer treatments

## 2024-03-29 ENCOUNTER — PATIENT OUTREACH (OUTPATIENT)
Dept: CASE MANAGEMENT | Facility: OTHER | Age: 79
End: 2024-03-29

## 2024-03-29 ENCOUNTER — NURSING HOME VISIT (OUTPATIENT)
Dept: GERIATRICS | Facility: OTHER | Age: 79
End: 2024-03-29
Payer: MEDICARE

## 2024-03-29 VITALS
TEMPERATURE: 97.5 F | RESPIRATION RATE: 18 BRPM | SYSTOLIC BLOOD PRESSURE: 137 MMHG | BODY MASS INDEX: 18.19 KG/M2 | HEART RATE: 87 BPM | WEIGHT: 119.6 LBS | OXYGEN SATURATION: 96 % | DIASTOLIC BLOOD PRESSURE: 83 MMHG

## 2024-03-29 DIAGNOSIS — J44.9 CHRONIC OBSTRUCTIVE PULMONARY DISEASE, UNSPECIFIED COPD TYPE (HCC): Primary | ICD-10-CM

## 2024-03-29 PROCEDURE — 99309 SBSQ NF CARE MODERATE MDM 30: CPT | Performed by: INTERNAL MEDICINE

## 2024-03-29 RX ORDER — CHOLECALCIFEROL (VITAMIN D3) 125 MCG
5 CAPSULE ORAL
COMMUNITY

## 2024-03-30 NOTE — ASSESSMENT & PLAN NOTE
History of advanced COPD with recent exacerbation.  Patient was additionally treated for pneumonia and COVID-19 infection during recent hospitalization.  She was noted to have postinfectious cough during last week.  Patient was started on tapering doses of prednisone.  Chest congestion and coughing spells have improved however patient complains of insomnia.  Discontinue prednisone likely aggravating insomnia and anxiety issues.

## 2024-04-01 ENCOUNTER — NURSING HOME VISIT (OUTPATIENT)
Dept: GERIATRICS | Facility: OTHER | Age: 79
End: 2024-04-01
Payer: MEDICARE

## 2024-04-01 DIAGNOSIS — I10 ESSENTIAL HYPERTENSION: ICD-10-CM

## 2024-04-01 DIAGNOSIS — J96.22 ACUTE ON CHRONIC RESPIRATORY FAILURE WITH HYPOXIA AND HYPERCAPNIA (HCC): ICD-10-CM

## 2024-04-01 DIAGNOSIS — I21.4 NON-ST ELEVATION MI (NSTEMI) (HCC): ICD-10-CM

## 2024-04-01 DIAGNOSIS — D64.9 ANEMIA, UNSPECIFIED TYPE: ICD-10-CM

## 2024-04-01 DIAGNOSIS — R19.5 LOOSE STOOLS: Primary | ICD-10-CM

## 2024-04-01 DIAGNOSIS — G47.00 INSOMNIA, UNSPECIFIED TYPE: ICD-10-CM

## 2024-04-01 DIAGNOSIS — J44.9 CHRONIC OBSTRUCTIVE PULMONARY DISEASE, UNSPECIFIED COPD TYPE (HCC): ICD-10-CM

## 2024-04-01 DIAGNOSIS — J96.21 ACUTE ON CHRONIC RESPIRATORY FAILURE WITH HYPOXIA AND HYPERCAPNIA (HCC): ICD-10-CM

## 2024-04-01 DIAGNOSIS — R26.2 AMBULATORY DYSFUNCTION: ICD-10-CM

## 2024-04-01 PROCEDURE — 99309 SBSQ NF CARE MODERATE MDM 30: CPT | Performed by: NURSE PRACTITIONER

## 2024-04-01 NOTE — ASSESSMENT & PLAN NOTE
BP acceptable   C/w carvedilol 25 mg q12 hrs  Remains on Furosemide 20 mg qd  Noted recommendation during hospitalization to initiate lisinopril as part of NSTEMI managemment. Pt will be evaluated by cardiology will follow their recommendations.   Continue bp monitoring

## 2024-04-01 NOTE — ASSESSMENT & PLAN NOTE
Appear euvolemic with no edema on B/L LE  H/o advanced COPD with recent exacerbation.  During her last hospitalization pt was additionally treated for pneumonia and COVID-19 infection. Pt was noted to have postinfectious cough during admission. Remains with residual productive cough. RUL +exp wheezes. No chest congestion noted today on exam.  On chronic O2, 4L via NC. No SOB at rest.+SOB on exertion. Afebrile and HD stable. Pt is on prednisone 40 mg daily. C/w budenoside- formoterol 2 puff, BID, ipratropium bromide 2.5 ml every 8 hrs, ventolin 2 puff every 6 hrs. Start robitussin twice a day for 5 days

## 2024-04-01 NOTE — ASSESSMENT & PLAN NOTE
Pt reports loose stools started yesterday 3/31/24 after eating pork and beans for Easter. Currently three episodes. No recent antibiotics use. Suspect related to consumption of foods. Staff reports pt ate a good amount of chocolate. Abd soft, NT/ND, +BS throughout. Denies fever or chills. Start imodium as needed. Follow bowel

## 2024-04-01 NOTE — PROGRESS NOTES
[facility]  [address]   [address]  [place], [address] [phone]    Progress Note  Code STR 31    Patient Location     [facility]     Reason for visit     Follow up     Patient’s care was coordinated with nursing facility staff. Recent vitals, labs and updated medications were reviewed on [facility] system of facility.     Loose stools  Pt reports loose stools started yesterday 3/31/24 after eating pork and beans for [facility]. Currently three episodes. No recent antibiotics use. Suspect related to consumption of foods. Staff reports pt ate a good amount of chocolate. Abd soft, NT/ND, +BS throughout. Denies fever or chills. Start imodium as needed. Follow bowel    COPD (chronic obstructive pulmonary disease) (HCC)  Appear euvolemic with no edema on B/L LE  H/o advanced COPD with recent exacerbation.  During her last hospitalization pt was additionally treated for pneumonia and COVID-19 infection. Pt was noted to have postinfectious cough during admission. Remains with residual productive cough. RUL +exp wheezes. No chest congestion noted today on exam.  On chronic O2, 4L via NC. No SOB at rest.+SOB on exertion. Afebrile and HD stable. Pt is on prednisone 40 mg daily. C/w budenoside- formoterol 2 puff, BID, ipratropium bromide 2.5 ml every 8 hrs, ventolin 2 puff every 6 hrs. Start robitussin twice a day for 5 days     Essential hypertension  BP acceptable   C/w carvedilol 25 mg q12 hrs  Remains on Furosemide 20 mg qd  Noted recommendation during hospitalization to initiate lisinopril as part of NSTEMI managemment. Pt will be evaluated by cardiology will follow their recommendations.   Continue bp monitoring     Ambulatory dysfunction  Multifactorial in the setting of acute and chronic medical conditions  Continue fall precautions  PT OT  Uses assistive RW   Continue supportive care     Insomnia  Stable on melatonin 5 mg qd  Continue supportive care      Anemia  Recent HGB 9.7  S/p PRBC transfusion with cardiac history   Suspect anemia of chronic disease however cannot rule out GI losses  Denies active bleeding  Monitor levels and trend     Acute on chronic respiratory failure with hypoxia and hypercapnia (HCC)  Pt is back to her baseline of 4 L o2 via NC  +sob with activity, no Sob at rest  S/p acute hypoxic respiratory failure due to COPD exacerbation and COVID-19 infection with superimposed pneumonia. Received remdesivir, antibiotic and steroids course. Monitor respiratory status    Non-ST elevation MI (NSTEMI) ([facility])  With recent history. Underwent cardiac cath revealed significant triple-vessel disease. Pt deferred CABG. Also dx with takotsubo cardiomyopathy on recent cath. EF noted to be 35%. Evaluated by cardiology service. Losartan switched to Entresto. Remains on Toproll XL, pravastatin, and ASA.  Follow lasix only as needed. On exam euvolemic. Repeat ECHO requested in May. After repeat ECHO in May plan is to remove LifeVest if improved EF.   Cardiology following at rehab pt will need to continue to follow up post dc      HPI     Patient is a 78-yr old female seen and examined for her acute and chronic medical conditions. At the time of my evaluation pt reports feeling ok. However she says she started with loose stools since yesterday. Pt attributes loose stools to eating pork and beans for Easter. Today she has had three-episodes of loose stools. Additionally pt complaints of chronic productive cough. Denies shortness of breath. No bilateral lower extremity edema noted. Pt uses 4L of O2 at baseline (chronic). Uses W/C for ambulation. Denies fever, chills, chest pain, dizziness or lightheadedness. Per pt no other concerns or issues at this time.     Review of Systems   Constitutional:  Negative for chills and fever.   HENT:  Negative for nosebleeds.    Respiratory:  Positive for shortness of breath (at exertion). Negative for cough and wheezing.     Cardiovascular: Negative.    Gastrointestinal:  Positive for diarrhea. Negative for abdominal distention, abdominal pain, constipation, nausea and vomiting.   Genitourinary:  Negative for dysuria and hematuria.   Musculoskeletal:  Positive for gait problem.   Skin:  Negative for color change.   Psychiatric/Behavioral:  Negative for sleep disturbance.        Past Medical History:   Diagnosis Date    Acute congestive heart failure ([facility]) 2021    Anxiety     Cardiac disease     Chest pain 2021    Chronic pain disorder     COPD (chronic obstructive pulmonary disease) ([facility])     COVID-19 02/15/2024    Depression     Heart disease     Hyperlipidemia     Hypertension     MI (myocardial infarction) ([facility])     MRSA (methicillin resistant Staphylococcus aureus)     Renal disorder     benign kidney tumor       Past Surgical History:   Procedure Laterality Date    APPENDECTOMY      CARDIAC CATHETERIZATION N/A 2024    Procedure: Cardiac catheterization;  Surgeon: Luke Malagon MD;  [facility]: [facility];  Service: Cardiology    CARDIAC CATHETERIZATION N/A 2024    Procedure: Cardiac Coronary Angiogram;  Surgeon: Luke Malagon MD;  [facility];  Service: Cardiology    CARDIAC CATHETERIZATION Left 2024    Procedure: Cardiac Left Heart Cath;  Surgeon: Luke Malagon MD;  [facility];  Service: Cardiology    ELBOW BURSA SURGERY Left 2018    D/T MRSA INFECTION    IR THORACENTESIS  2024    SPINAL FUSION      L7 L9       Social History     Tobacco Use   Smoking Status Former    Current packs/day: 0.00    Average packs/day: 1 pack/day for 60.0 years (60.0 ttl pk-yrs)    Types: Cigarettes    Start date:     Quit date:     Years since quittin.2   Smokeless Tobacco Never   Tobacco Comments    She has close to a 60 pack-year smoking history, most recently has cut down to 4-5 cigarettes daily       Family History   Problem Relation Age of  Onset    Heart disease Mother     Cancer Father         Allergies   Allergen Reactions    Sulfa Antibiotics Anaphylaxis    Iron GI Intolerance    Niacin     Statins Other (See Comments)     cramps       Updated list was reviewed in [facility] of facility.     There were no vitals filed for this visit.    Physical Exam  Vitals and nursing note reviewed.   Constitutional:       General: She is not in acute distress.     Appearance: She is ill-appearing (chronically). She is not toxic-appearing.   HENT:      Nose: Nose normal.      Mouth/Throat:      Mouth: Mucous membranes are moist.   Eyes:      General:         Right eye: No discharge.         Left eye: No discharge.   Cardiovascular:      Rate and Rhythm: Normal rate and regular rhythm.      Pulses: Normal pulses.      Heart sounds: Normal heart sounds.   Pulmonary:      Effort: Pulmonary effort is normal. No respiratory distress.      Breath sounds: Wheezing (RUL +expiratory wheezes) present.   Abdominal:      General: Bowel sounds are normal. There is no distension.      Palpations: Abdomen is soft.      Tenderness: There is no abdominal tenderness. There is no guarding.   Musculoskeletal:      Cervical back: Normal range of motion and neck supple. No rigidity or tenderness.      Right lower leg: No edema.      Left lower leg: No edema.   Skin:     General: Skin is warm.      Coloration: Skin is not jaundiced.      Findings: No bruising or lesion.   Neurological:      General: No focal deficit present.      Mental Status: She is oriented to person, place, and time.      Cranial Nerves: No cranial nerve deficit.      Motor: Weakness present.      Gait: Gait abnormal.   Psychiatric:         Mood and Affect: Mood normal.     Medications reviewed     Recent labs were reviewed.  , cr 0.8, BUN 15, sodium 139, K 3.5, calcium 8.9, GFR >60    B12 514, Ferritin 219.1, Iron 57, Uns binding 110, total iron binding 167, asencio sat 34,     Additional Notes:      This note was electronically signed by TAMMIE River

## 2024-04-02 ENCOUNTER — NURSING HOME VISIT (OUTPATIENT)
Dept: GERIATRICS | Facility: OTHER | Age: 79
End: 2024-04-02
Payer: MEDICARE

## 2024-04-02 ENCOUNTER — NURSING HOME VISIT (OUTPATIENT)
Dept: PULMONOLOGY | Facility: CLINIC | Age: 79
End: 2024-04-02
Payer: MEDICARE

## 2024-04-02 ENCOUNTER — TELEPHONE (OUTPATIENT)
Dept: OTHER | Facility: OTHER | Age: 79
End: 2024-04-02

## 2024-04-02 VITALS
TEMPERATURE: 98.2 F | DIASTOLIC BLOOD PRESSURE: 67 MMHG | SYSTOLIC BLOOD PRESSURE: 118 MMHG | BODY MASS INDEX: 18.25 KG/M2 | RESPIRATION RATE: 18 BRPM | WEIGHT: 120 LBS | OXYGEN SATURATION: 97 % | HEART RATE: 83 BPM

## 2024-04-02 DIAGNOSIS — J44.9 CHRONIC OBSTRUCTIVE PULMONARY DISEASE, UNSPECIFIED COPD TYPE (HCC): Primary | ICD-10-CM

## 2024-04-02 DIAGNOSIS — J44.0 CHRONIC OBSTRUCTIVE PULMONARY DISEASE WITH ACUTE LOWER RESPIRATORY INFECTION (HCC): Primary | ICD-10-CM

## 2024-04-02 DIAGNOSIS — J96.22 ACUTE ON CHRONIC RESPIRATORY FAILURE WITH HYPOXIA AND HYPERCAPNIA (HCC): ICD-10-CM

## 2024-04-02 DIAGNOSIS — J96.21 ACUTE ON CHRONIC RESPIRATORY FAILURE WITH HYPOXIA AND HYPERCAPNIA (HCC): ICD-10-CM

## 2024-04-02 PROCEDURE — 99309 SBSQ NF CARE MODERATE MDM 30: CPT | Performed by: INTERNAL MEDICINE

## 2024-04-02 PROCEDURE — 99308 SBSQ NF CARE LOW MDM 20: CPT | Performed by: INTERNAL MEDICINE

## 2024-04-02 NOTE — ASSESSMENT & PLAN NOTE
Stable.  No bronchospasm.  SaO2 stable on supplemental oxygen.  Continue budesonide/formoterol inhaler, albuterol and ipratropium nebulization.    Patient was started on prednisone 40 mg daily last week for increased chest congestion and bronchospasm, overall improved.  Last dose to be given tomorrow

## 2024-04-02 NOTE — TELEPHONE ENCOUNTER
Fall, resident fell and hit the back of her head, she has a hematoma and is complaining of back pain.

## 2024-04-02 NOTE — ASSESSMENT & PLAN NOTE
Pt is back to her baseline of 4 L o2 via NC  +sob with activity, no Sob at rest  S/p acute hypoxic respiratory failure due to COPD exacerbation and COVID-19 infection with superimposed pneumonia. Received remdesivir, antibiotic and steroids course. Monitor respiratory status

## 2024-04-02 NOTE — ASSESSMENT & PLAN NOTE
Patient is clinically improved from pulmonary standpoint agree with transitioning to Anoro 1 puff daily upon discharge instead of Symbicort to help decrease chance of pneumonia.  In the meantime we will continue Symbicort 2 puffs twice a day and DuoNebs.  Unfortunately patient had a fall yesterday and has significant rib pain agree with rib x-rays and low back x-ray.  In the meantime will initiate incentive spirometer in order to decrease chance of post trauma pneumonia

## 2024-04-02 NOTE — PROGRESS NOTES
[facility]  [address]   [address]  [place], [place], [id number] [phone]    Progress Note  Code [facility] 31    Patient Location     [facility]    Reason for visit     Fall  Follow  essential hypertension, anemia, depression, recent MI, COPD, chronic respiratory failure  Problem List Items Addressed This Visit          Respiratory    COPD (chronic obstructive pulmonary disease) (HCC) - Primary     Stable.  No bronchospasm.  SaO2 stable on supplemental oxygen.  Continue budesonide/formoterol inhaler, albuterol and ipratropium nebulization.    Patient was started on prednisone 40 mg daily last week for increased chest congestion and bronchospasm, overall improved.  Last dose to be given tomorrow          Fall:  Patient sustained an accidental fall last night while getting out of the bathroom.  Head injury with wheelchair was reported.  Patient was noted to have a small scalp hematoma.  Patient declined ER transfer for evaluation. Currently complains of right-sided rib pain and lower back pain.  Will obtain x-rays to rule out fracture.    Anemia:  Hemoglobin was stable at 9.7 on 3/26/2024.    S/P PRBC transfusion earlier for hemoglobin of 7.2      Chronic respiratory failure:  SaO2 remains stable at 96% on supplemental oxygen.  Patient was recently treated for COVID-19 infection and COPD exacerbation at the hospital    Non-ST elevation MI:  History of recent non-ST elevation MI. Cardiac cath revealed significant triple-vessel disease.  Patient has decided against CABG..  She was also diagnosed with Takotsubo cardiomyopathy on recent cath.  EF was 35%.     Repeat echo is planned in May.  Patient currently has LifeVest in place.  Continue maintenance labs including aspirin, statin Toprol, as needed nitroglycerin and Entresto.  Follow-up with cardiology service    Essential hypertension:  BP stable on  Toprol-XL 50 mg twice daily and  Entresto 1 tablet twice daily.    COVID-19  infection:  History of recent COVID-19 infection treated with remdesivir, steroid and antibiotic .  Respiratory status remains stable with 96% on chronic oxygen    Waldenström macroglobinemia:  Continue  zanubrutinib 160 mg twice a day.  Follow-up with oncology service    Depression:  Pt remains on  Vraylar, Vilzodone, Bupropion .    HPI       Patient is being seen for evaluation of a follow-up.  Per records patient sustained an accidental fall while getting out of the restroom last night.  Patient complaint of hitting her head on the wheelchair.  She was noted to have small scalp hematoma, additionally complaint of right-sided rib and lower back pain.  Patient declined transfer to ER last night.  At the time of my evaluation patient is doing okay except complains of right-sided rib and lower back pain.  No focal changes.  Dyspnea and chest congestion has improved      Review of Systems   Constitutional:  Negative for chills and fever.   Respiratory:  Positive for cough (improved) and shortness of breath (with actvities). Negative for wheezing and stridor.    Cardiovascular:  Negative for chest pain and leg swelling.   Gastrointestinal:  Negative for abdominal distention, abdominal pain and vomiting.   Genitourinary:  Negative for flank pain and hematuria.   Musculoskeletal:  Positive for arthralgias (Right-sided rib pain), back pain and gait problem.   Neurological:  Positive for weakness. Negative for seizures and syncope.   Psychiatric/Behavioral:  Negative for agitation, behavioral problems and sleep disturbance.        Past Medical History:   Diagnosis Date    Acute congestive heart failure (HCC) 08/27/2021    Anxiety     Cardiac disease     Chest pain 08/27/2021    Chronic pain disorder     COPD (chronic obstructive pulmonary disease) ([facility])     COVID-19 02/15/2024    Depression     Heart disease     Hyperlipidemia     Hypertension     MI (myocardial infarction) ([facility])     MRSA (methicillin resistant  Staphylococcus aureus)     Renal disorder     benign kidney tumor       Past Surgical History:   Procedure Laterality Date    APPENDECTOMY      CARDIAC CATHETERIZATION N/A 2024    Procedure: Cardiac catheterization;  Surgeon: Luke Malagon MD;  Location: [facility];  Service: Cardiology    CARDIAC CATHETERIZATION N/A 2024    Procedure: Cardiac Coronary Angiogram;  Surgeon: Luke Malagon MD;  [facility];  Service: Cardiology    CARDIAC CATHETERIZATION Left 2024    Procedure: Cardiac Left Heart Cath;  Surgeon: Luke Malagon MD;  [facility]: [facility];  Service: Cardiology    ELBOW BURSA SURGERY Left 2018    D/T MRSA INFECTION    IR THORACENTESIS  2024    SPINAL FUSION      L7 L9       Social History     Tobacco Use   Smoking Status Former    Current packs/day: 0.00    Average packs/day: 1 pack/day for 60.0 years (60.0 ttl pk-yrs)    Types: Cigarettes    Start date:     Quit date:     Years since quittin.2   Smokeless Tobacco Never   Tobacco Comments    She has close to a 60 pack-year smoking history, most recently has cut down to 4-5 cigarettes daily       Family History   Problem Relation Age of Onset    Heart disease Mother     Cancer Father         Allergies   Allergen Reactions    Sulfa Antibiotics Anaphylaxis    Iron GI Intolerance    Niacin     Statins Other (See Comments)     cramps         Current Outpatient Medications:     albuterol (VENTOLIN HFA) 90 mcg/act inhaler, Inhale 2 puffs every 6 (six) hours as needed for wheezing, Disp: 1 Inhaler, Rfl: 0    aspirin (ECOTRIN LOW STRENGTH) 81 mg EC tablet, Take 1 tablet (81 mg total) by mouth daily, Disp: 30 tablet, Rfl: 0    buPROPion (WELLBUTRIN) 75 mg tablet, Take 75 mg by mouth in the morning, Disp: , Rfl:     Calcium-Magnesium-Vitamin D (CALCIUM 500 PO), Take 1,000 mg by mouth daily, Disp: , Rfl:     cariprazine (Vraylar) 1.5 MG capsule, Take 1.5 mg by mouth daily, Disp: ,  Rfl:     carvedilol (COREG) 25 mg tablet, Take 25 mg by mouth 2 (two) times a day with meals, Disp: , Rfl:     ipratropium (ATROVENT) 0.02 % nebulizer solution, Take 2.5 mL (0.5 mg total) by nebulization 3 (three) times a day, Disp: 225 mL, Rfl: 0    lidocaine (LIDODERM) 5 %, Apply 2 patches topically over 12 hours daily for 10 days Remove & Discard patch within 12 hours or as directed by MD, Disp: 20 patch, Rfl: 0    meclizine (ANTIVERT) 25 mg tablet, Take 1 tablet (25 mg total) by mouth every 8 (eight) hours as needed for dizziness for up to 7 days, Disp: 10 tablet, Rfl: 0    melatonin 3 mg, Take 1 tablet (3 mg total) by mouth daily at bedtime as needed (insomnia), Disp: 30 tablet, Rfl: 0    Melatonin 5 MG TABS, Take 5 mg by mouth daily at bedtime, Disp: , Rfl:     midodrine (PROAMATINE) 2.5 mg tablet, Take 1 tablet (2.5 mg total) by mouth 3 (three) times a day before meals, Disp: 90 tablet, Rfl: 0    nitroglycerin (NITROSTAT) 0.4 mg SL tablet, Place 0.4 mg under the tongue every 5 (five) minutes as needed for chest pain, Disp: , Rfl:     pantoprazole (PROTONIX) 40 mg tablet, Take 1 tablet (40 mg total) by mouth daily, Disp: 30 tablet, Rfl: 0    pravastatin (PRAVACHOL) 80 mg tablet, Take 1 tablet (80 mg total) by mouth every evening, Disp: 30 tablet, Rfl: 0    sacubitril-valsartan (Entresto) 24-26 MG TABS, Take 1 tablet by mouth 2 (two) times a day, Disp: , Rfl:     vilazodone (VIIBRYD) 40 mg tablet, Take 40 mg by mouth daily with breakfast, Disp: , Rfl:     Zanubrutinib 80 MG CAPS, Take 160 mg by mouth 2 (two) times a day, Disp: , Rfl:     Medications that were added today include prednisone 40 mg for 2 days, 30 mg for 2 days, 20 mg for 2 days then 10 mg for 2 days  Guaifenesin 600 mg twice daily    Updated list was reviewed in [facility] system of facility.     Vitals:    04/02/24 1108   BP: 118/67   Pulse: 83   Resp: 18   Temp: 98.2 °F (36.8 °C)   SpO2: 97%             Physical Exam  Constitutional:        "General: She is not in acute distress.  HENT:      Head: Normocephalic.      Comments: Small bulge likely  resolving hematoma involving occipital area  Cardiovascular:      Rate and Rhythm: Normal rate and regular rhythm.   Pulmonary:      Breath sounds: Rales (Few scattered rales bilaterally) present. No wheezing or rhonchi.      Comments: Pt on Oxygen      Abdominal:      General: There is no distension.      Palpations: Abdomen is soft.      Tenderness: There is no abdominal tenderness. There is no guarding.   Musculoskeletal:         General: Tenderness (Involving the right lower lateral ribs) present.      Cervical back: Neck supple.      Right lower leg: No edema.      Left lower leg: No edema.   Skin:     Comments: Ecchymosis involving bilateral upper extremities   Neurological:      General: No focal deficit present.      Cranial Nerves: No cranial nerve deficit.      Motor: Weakness present.   Psychiatric:         Mood and Affect: Mood normal.         Behavior: Behavior normal.         Diagnostic Data:    Labs from 3/26 revealed a BNP of 297, creatinine 0.8, blood glucose 186, BUN 15, potassium 3.5, sodium 139  Hemoglobin 9.7, WBC count 9.5  Labs from 3/11 revealed  HGB  7.8,   3/18 B12, Folate ok, iron 57        Lab Results   Component Value Date    WBC 5.63 03/19/2024    HGB 8.1 (L) 03/19/2024    HCT 27.1 (L) 03/19/2024    MCV 99 (H) 03/19/2024     03/19/2024      Lab Results   Component Value Date    SODIUM 140 03/07/2024    K 4.0 03/07/2024     03/07/2024    CO2 34 (H) 03/07/2024    BUN 24 03/07/2024    CREATININE 0.77 03/07/2024    GLUC 106 03/07/2024    CALCIUM 8.7 03/07/2024      Additional Notes:    Repeat labs in a.m.  Check chest x-ray  Add tapering doses of steroids and guaifenesin 600 mg twice daily  Request pulmonary follow-up      Portions of the record may have been created with voice recognition software.  Occasional wrong word or \"sound a like\" substitutions may have occurred " due to the inherent limitations of voice recognition software.  Read the chart carefully and recognize, using context, where substitutions have occurred.    This note was electronically signed by Dr. Annamarie Muhammad

## 2024-04-02 NOTE — ASSESSMENT & PLAN NOTE
Recent HGB 9.7  S/p PRBC transfusion with cardiac history   Suspect anemia of chronic disease however cannot rule out GI losses  Denies active bleeding  Monitor levels and trend

## 2024-04-02 NOTE — PROGRESS NOTES
Assessment/Plan:    COPD (chronic obstructive pulmonary disease) (HCC)  Patient is clinically improved from pulmonary standpoint agree with transitioning to Anoro 1 puff daily upon discharge instead of Symbicort to help decrease chance of pneumonia.  In the meantime we will continue Symbicort 2 puffs twice a day and DuoNebs.  Unfortunately patient had a fall yesterday and has significant rib pain agree with rib x-rays and low back x-ray.  In the meantime will initiate incentive spirometer in order to decrease chance of post trauma pneumonia    Acute on chronic respiratory failure with hypoxia and hypercapnia (HCC)  Patient is improved to her home oxygenation currently on 4 L     Diagnoses and all orders for this visit:    Chronic obstructive pulmonary disease with acute lower respiratory infection (HCC)    Acute on chronic respiratory failure with hypoxia and hypercapnia (HCC)          Subjective:      Patient ID: Lauren Quintero is a 78 y.o. female.    Lauren is feeling better with regards to her cough and shortness of breath.  She is no longer coughing up secretions weaning off prednisone.  Unfortunately she did suffer a fall last night and has significant rib pain and back pain.        The following portions of the patient's history were reviewed and updated as appropriate: allergies, current medications, past family history, past medical history, past social history, past surgical history, and problem list.    Review of Systems   Constitutional: Negative.  Negative for unexpected weight change.   HENT: Negative.  Negative for postnasal drip.    Eyes: Negative.    Respiratory: Negative.  Negative for cough, shortness of breath and wheezing.    Cardiovascular: Negative.  Negative for chest pain and leg swelling.   Gastrointestinal: Negative.    Endocrine: Negative.    Genitourinary: Negative.    Musculoskeletal: Negative.    Allergic/Immunologic: Negative.    Neurological: Negative.    Hematological: Negative.           Objective:      There were no vitals taken for this visit.         Physical Exam  Constitutional:       Appearance: She is well-developed.   HENT:      Head: Normocephalic.   Eyes:      Pupils: Pupils are equal, round, and reactive to light.   Cardiovascular:      Rate and Rhythm: Normal rate.      Heart sounds: No murmur heard.  Pulmonary:      Effort: Pulmonary effort is normal. No respiratory distress.      Breath sounds: Normal breath sounds. No wheezing or rales.   Abdominal:      Palpations: Abdomen is soft.   Musculoskeletal:         General: Normal range of motion.      Cervical back: Normal range of motion and neck supple.   Skin:     General: Skin is warm and dry.   Neurological:      Mental Status: She is alert and oriented to person, place, and time.

## 2024-04-02 NOTE — ASSESSMENT & PLAN NOTE
With recent history. Underwent cardiac cath revealed significant triple-vessel disease. Pt deferred CABG. Also dx with takotsubo cardiomyopathy on recent cath. EF noted to be 35%. Evaluated by cardiology service. Losartan switched to Entresto. Remains on Toproll XL, pravastatin, and ASA.  Follow lasix only as needed. On exam euvolemic. Repeat ECHO requested in May. After repeat ECHO in May plan is to remove LifeVest if improved EF.   Cardiology following at rehab pt will need to continue to follow up post dc

## 2024-04-02 NOTE — ASSESSMENT & PLAN NOTE
Multifactorial in the setting of acute and chronic medical conditions  Continue fall precautions  PT OT  Uses assistive RW   Continue supportive care

## 2024-04-04 ENCOUNTER — PATIENT OUTREACH (OUTPATIENT)
Dept: CASE MANAGEMENT | Facility: OTHER | Age: 79
End: 2024-04-04

## 2024-04-04 NOTE — PROGRESS NOTES
Update obtained from  SNF Coordinator the patient continues with Therapy no LCD at this time. This Admin Coordinator will continue to monitor via chart review.

## 2024-04-05 ENCOUNTER — PATIENT OUTREACH (OUTPATIENT)
Dept: CASE MANAGEMENT | Facility: OTHER | Age: 79
End: 2024-04-05

## 2024-04-05 NOTE — PROGRESS NOTES
Chart notes indicate a continued STR admission at [facility].  RT will reach out after discharge to home.

## 2024-04-08 ENCOUNTER — NURSING HOME VISIT (OUTPATIENT)
Dept: GERIATRICS | Facility: OTHER | Age: 79
End: 2024-04-08
Payer: MEDICARE

## 2024-04-08 DIAGNOSIS — R26.2 AMBULATORY DYSFUNCTION: ICD-10-CM

## 2024-04-08 DIAGNOSIS — J96.22 ACUTE ON CHRONIC RESPIRATORY FAILURE WITH HYPOXIA AND HYPERCAPNIA (HCC): ICD-10-CM

## 2024-04-08 DIAGNOSIS — J96.21 ACUTE ON CHRONIC RESPIRATORY FAILURE WITH HYPOXIA AND HYPERCAPNIA (HCC): ICD-10-CM

## 2024-04-08 DIAGNOSIS — N18.31 STAGE 3A CHRONIC KIDNEY DISEASE (HCC): ICD-10-CM

## 2024-04-08 DIAGNOSIS — R52 PAIN: Primary | ICD-10-CM

## 2024-04-08 DIAGNOSIS — R53.1 GENERALIZED WEAKNESS: ICD-10-CM

## 2024-04-08 PROCEDURE — 99309 SBSQ NF CARE MODERATE MDM 30: CPT | Performed by: NURSE PRACTITIONER

## 2024-04-09 NOTE — PROGRESS NOTES
[facility]  [address]   [address]  [place], [address] [phone]    Progress Note  Code STR 31    Patient Location     [facility]     Reason for visit     Follow up     Patient’s care was coordinated with nursing facility staff. Recent vitals, labs and updated medications were reviewed on [facility] system of facility.     Pain  C/o back and neck pain post fall   Xray ribs, spine & lumbosacral negative for fx or dislocation  Pt appears weak, frail and deconditioned  Neuros intact, VSS. On exam no injuries noted  Moving all 4 extremities  C/w percocet 5-325 mg q6 hr prn hold for sedation  Continue PT OT  Supportive care   Fall and safety precautions       Generalized weakness  H/o frequent falls   On exam appears weak, frail and deconditioned  Encourage adequate nutrition, MVI and supplements  PT OT  Fall precautions  Assist pt with ADLs/IADLs      Acute on chronic respiratory failure with hypoxia and hypercapnia (HCC)  Remains on chronic O2. Respiratory status at 92%. +SOB with exertion. Lungs bases +exp wheezes. No bronchospasm  C/w ipratropium bromide , ventolin MDI, albuterol nebs, and fluticasone salmeterol breathing tx. Pulmonology serv following      CKD (chronic kidney disease)  Lab Results   Component Value Date    EGFR 74 03/07/2024    EGFR 72 03/05/2024    EGFR 75 03/04/2024    CREATININE 0.77 03/07/2024    CREATININE 0.79 03/05/2024    CREATININE 0.76 03/04/2024   Cr stable. GFR >60  Avoid nephrotoxic agents and hypotension  Renal dose medications for GFR <30  Monitor cr periodically     Ambulatory dysfunction  Multifactorial in the setting of acute and chronic medical conditions  Continue fall precautions  PT OT  Uses assistive RW   Continue supportive care        HPI     Patient is a 78-yr old female seen and examined for her acute and chronic medical conditions. At the time of my evaluation pt is lying in bed. Appears in no acute distress. C/o back  and neck pain from fall. On exam no injuries noted. Pt able to move all extremities. Collaborated care with nursing. Neuros intact. VSS. Pt reports percocet managing pain. Xray R ribs, spine, lumbosacral unremarkable for fx or dislocation. Remains on 4 L o2. Denies dyspnea, cough, cp, palpitations, lightheadedness, dizziness, visual disturbances, fever or chills. Reports consuming at least 50% for all meals. Sleeping with no difficulties. Per pt no other concerns or issues at this time.      Review of Systems   Constitutional:  Negative for chills and fever.   HENT:  Negative for nosebleeds.    Respiratory:  Positive for shortness of breath (at exertion). Negative for cough and wheezing.    Cardiovascular: Negative.    Gastrointestinal:  Negative for abdominal distention, abdominal pain, constipation, diarrhea, nausea and vomiting.   Genitourinary:  Negative for dysuria and hematuria.   Musculoskeletal:  Positive for arthralgias, back pain and gait problem. Negative for neck pain and neck stiffness.   Skin:  Negative for color change.   Psychiatric/Behavioral:  Negative for sleep disturbance.        Past Medical History:   Diagnosis Date    Acute congestive heart failure (HCC) 08/27/2021    Anxiety     Cardiac disease     Chest pain 08/27/2021    Chronic pain disorder     COPD (chronic obstructive pulmonary disease) ([facility])     COVID-19 02/15/2024    Depression     Heart disease     Hyperlipidemia     Hypertension     MI (myocardial infarction) ([facility])     MRSA (methicillin resistant Staphylococcus aureus)     Renal disorder     benign kidney tumor       Past Surgical History:   Procedure Laterality Date    APPENDECTOMY      CARDIAC CATHETERIZATION N/A 2/21/2024    Procedure: Cardiac catheterization;  Surgeon: Luke Malagon MD;  Location: [facility];  Service: Cardiology    CARDIAC CATHETERIZATION N/A 2/21/2024    Procedure: Cardiac Coronary Angiogram;  Surgeon: Luke Malagon MD;  Location: [facility]  [facility];  Service: Cardiology    CARDIAC CATHETERIZATION Left 2024    Procedure: Cardiac Left Heart Cath;  Surgeon: Luke Malagon MD;  Location: [facility];  Service: Cardiology    ELBOW BURSA SURGERY Left 2018    D/T MRSA INFECTION    IR THORACENTESIS  2024    SPINAL FUSION      L7 L9       Social History     Tobacco Use   Smoking Status Former    Current packs/day: 0.00    Average packs/day: 1 pack/day for 60.0 years (60.0 ttl pk-yrs)    Types: Cigarettes    Start date:     Quit date:     Years since quittin.2   Smokeless Tobacco Never   Tobacco Comments    She has close to a 60 pack-year smoking history, most recently has cut down to 4-5 cigarettes daily       Family History   Problem Relation Age of Onset    Heart disease Mother     Cancer Father         Allergies   Allergen Reactions    Sulfa Antibiotics Anaphylaxis    Iron GI Intolerance    Niacin     Statins Other (See Comments)     cramps       Updated list was reviewed in [facility] of facility.     VS  /76, P 82, R 18, T 97.7, O2 92% on 4 L, W 115.6 lb    Physical Exam  Vitals and nursing note reviewed.   Constitutional:       General: She is not in acute distress.     Appearance: She is ill-appearing (chronically). She is not toxic-appearing.   HENT:      Head: Normocephalic and atraumatic.      Nose: Nose normal.      Mouth/Throat:      Mouth: Mucous membranes are moist.   Eyes:      General:         Right eye: No discharge.         Left eye: No discharge.   Cardiovascular:      Rate and Rhythm: Normal rate and regular rhythm.      Pulses: Normal pulses.      Heart sounds: Normal heart sounds.   Pulmonary:      Effort: Pulmonary effort is normal. No respiratory distress.      Breath sounds: Wheezing (RUL +expiratory wheezes) present.   Abdominal:      General: Bowel sounds are normal. There is no distension.      Palpations: Abdomen is soft.      Tenderness: There is no abdominal tenderness. There  is no guarding.   Musculoskeletal:      Cervical back: Normal range of motion and neck supple. No rigidity or tenderness.      Right lower leg: No edema.      Left lower leg: No edema.   Skin:     General: Skin is warm.      Coloration: Skin is not jaundiced.      Findings: No bruising or lesion.   Neurological:      General: No focal deficit present.      Mental Status: She is oriented to person, place, and time.      Cranial Nerves: No cranial nerve deficit.      Motor: Weakness present.      Gait: Gait abnormal.   Psychiatric:         Mood and Affect: Mood normal.     Medications reviewed     Recent labs were reviewed.  , cr 0.8, BUN 15, sodium 139, K 3.5, calcium 8.9, GFR >60    B12 514, Ferritin 219.1, Iron 57, Uns binding 110, total iron binding 167, asencio sat 34,     Additional Notes:     This note was electronically signed by TAMMIE River

## 2024-04-12 ENCOUNTER — PATIENT OUTREACH (OUTPATIENT)
Dept: CASE MANAGEMENT | Facility: OTHER | Age: 79
End: 2024-04-12

## 2024-04-12 ENCOUNTER — NURSING HOME VISIT (OUTPATIENT)
Dept: GERIATRICS | Facility: OTHER | Age: 79
End: 2024-04-12
Payer: MEDICARE

## 2024-04-12 DIAGNOSIS — I21.4 NON-ST ELEVATION MI (NSTEMI) (HCC): ICD-10-CM

## 2024-04-12 DIAGNOSIS — J96.22 ACUTE ON CHRONIC RESPIRATORY FAILURE WITH HYPOXIA AND HYPERCAPNIA (HCC): Primary | ICD-10-CM

## 2024-04-12 DIAGNOSIS — J44.9 CHRONIC OBSTRUCTIVE PULMONARY DISEASE, UNSPECIFIED COPD TYPE (HCC): ICD-10-CM

## 2024-04-12 DIAGNOSIS — F33.1 MAJOR DEPRESSIVE DISORDER, RECURRENT EPISODE, MODERATE (HCC): ICD-10-CM

## 2024-04-12 DIAGNOSIS — I50.42 CHRONIC COMBINED SYSTOLIC AND DIASTOLIC CHF (CONGESTIVE HEART FAILURE) (HCC): ICD-10-CM

## 2024-04-12 DIAGNOSIS — I10 ESSENTIAL HYPERTENSION: ICD-10-CM

## 2024-04-12 DIAGNOSIS — J96.21 ACUTE ON CHRONIC RESPIRATORY FAILURE WITH HYPOXIA AND HYPERCAPNIA (HCC): Primary | ICD-10-CM

## 2024-04-12 PROCEDURE — 99310 SBSQ NF CARE HIGH MDM 45: CPT | Performed by: NURSE PRACTITIONER

## 2024-04-15 ENCOUNTER — PATIENT OUTREACH (OUTPATIENT)
Dept: CASE MANAGEMENT | Facility: OTHER | Age: 79
End: 2024-04-15

## 2024-04-15 PROBLEM — R52 PAIN: Status: ACTIVE | Noted: 2024-04-15

## 2024-04-15 NOTE — PROGRESS NOTES
Call placed to [facility] the patient is currently at their facility for STR no LCD at this time. It has been 30 days since SNF/STR Surveillance episode was opened I will no longer be following the patient per protocol. I have removed myself from the care team, updated the Care Coordination note, and closed the episode. Inbasket sent to the OPCM to inform of the closure and hand off.

## 2024-04-16 NOTE — ASSESSMENT & PLAN NOTE
Remains on chronic O2. Respiratory status at 92%. +SOB with exertion. Lungs bases +exp wheezes. No bronchospasm  C/w ipratropium bromide , ventolin MDI, albuterol nebs, and fluticasone salmeterol breathing tx. Pulmonology serv following

## 2024-04-16 NOTE — ASSESSMENT & PLAN NOTE
Lab Results   Component Value Date    EGFR 74 03/07/2024    EGFR 72 03/05/2024    EGFR 75 03/04/2024    CREATININE 0.77 03/07/2024    CREATININE 0.79 03/05/2024    CREATININE 0.76 03/04/2024   Cr stable. GFR >60  Avoid nephrotoxic agents and hypotension  Renal dose medications for GFR <30  Monitor cr periodically

## 2024-04-16 NOTE — ASSESSMENT & PLAN NOTE
C/o back and neck pain post fall   Xray ribs, spine & lumbosacral negative for fx or dislocation  Pt appears weak, frail and deconditioned  Neuros intact, VSS. On exam no injuries noted  Moving all 4 extremities  C/w percocet 5-325 mg q6 hr prn hold for sedation  Continue PT OT  Supportive care   Fall and safety precautions

## 2024-04-16 NOTE — ASSESSMENT & PLAN NOTE
H/o frequent falls   On exam appears weak, frail and deconditioned  Encourage adequate nutrition, MVI and supplements  PT OT  Fall precautions  Assist pt with ADLs/IADLs

## 2024-04-17 ENCOUNTER — PATIENT OUTREACH (OUTPATIENT)
Dept: CASE MANAGEMENT | Facility: OTHER | Age: 79
End: 2024-04-17

## 2024-04-17 ENCOUNTER — NURSING HOME VISIT (OUTPATIENT)
Dept: GERIATRICS | Facility: OTHER | Age: 79
End: 2024-04-17
Payer: MEDICARE

## 2024-04-17 DIAGNOSIS — K59.1 FUNCTIONAL DIARRHEA: Primary | ICD-10-CM

## 2024-04-17 DIAGNOSIS — D64.9 ANEMIA, UNSPECIFIED TYPE: ICD-10-CM

## 2024-04-17 DIAGNOSIS — R26.2 AMBULATORY DYSFUNCTION: ICD-10-CM

## 2024-04-17 DIAGNOSIS — I50.42 CHRONIC COMBINED SYSTOLIC AND DIASTOLIC CHF (CONGESTIVE HEART FAILURE) (HCC): ICD-10-CM

## 2024-04-17 DIAGNOSIS — J44.9 CHRONIC OBSTRUCTIVE PULMONARY DISEASE, UNSPECIFIED COPD TYPE (HCC): ICD-10-CM

## 2024-04-17 PROCEDURE — 99310 SBSQ NF CARE HIGH MDM 45: CPT | Performed by: NURSE PRACTITIONER

## 2024-04-17 NOTE — PROGRESS NOTES
OP RT CM received in basket message from Zahira DENG From Care management. Patient is still in STR. OP RT CM will outreach once discharged to home.

## 2024-04-17 NOTE — PROGRESS NOTES
"[address], [facility] [address]  [phone]  [facility]  POS 31  PROGRESS NOTE        NAME: Lauren Quintero  AGE: 78 y.o. SEX: female  :  1945  DATE OF ENCOUNTER: 2024    Chief Complaint   Patient seen and examined for follow up on chronic conditions.     History of Present Illness     Lauren Quintero is a patient of [facility] with acute and chronic medical conditions of CHF, CAD, hypertension, non-STEMI, chronic respiratory failure with hypoxia and hypercapnia, COPD, BR BPR, CKD, depression, anemia, generalized weakness, insomnia, abnormal chest x-ray, abnormal CTA, diverticulitis, dizziness, fatigue, loose stools, moderate protein calorie malnutrition, and ambulatory dysfunction.    The patient is being seen and examined today for follow-up on acute and chronic medical conditions. Upon examination, the patient is sitting in her bed, alert, cooperative, and in no acute distress.      The patient reports that she continues to have lower back/sacral pain.  She denies any issues with sob and continues on supplemental oxygen 4L. She reports that she has had diarrhea r/t her diverticulosis.  She states that the moment she eats something \"it goes right through me.\"    PT/OT have been providing therapy treatment for restorative services.    Patient was hospitalized on 2/15/2024 due to worsening shortness of breath. She was found to be COVID-positive. CT imaging showed presence of tree-in-bud opacities representing infection with some mild pulmonary edema. She also had elevated troponin .  Patient was started on dexamethasone, Remdesivir and baricitinib.  She was seen in consultation by cardiology and pulmonary services.  Echo revealed reduced LV function of 35% and mild diastolic dysfunction.  Patient was diagnosed with NSTEMI.  Patient subsequently underwent cardiac catheterization revealing triple-vessel disease 90% stenosis of LAD, 80% stenosis of mid circumflex and 70% " stenosis of mid RCA. Outpatient follow-up with CT surgery was recommended for possible CABG.  Patient had a prolonged hospital stay complicated by increased chest congestion cough and pleuritic chest pain during the later half of her stay.  Repeat chest x-ray and CT showed an opacity in right lower lobe suspected to be pneumonia versus mucous plugging causing atelectasis.  She was started on IV ceftriaxone. She was encouraged to continue incentive spirometry, flutter valve therapy.  Antibiotics were later switched to Unasyn for suspected aspiration pneumonia.  Swallowing study showed mild oropharyngeal retention but no obvious aspiration.  Patient clinically improved on Unasyn. Oxygen requirements returned to baseline of 4 L.  She had some productive cough with some hemoptysis. Hemoglobin remained stable.  Patient overall improved.  Patient was discharged with LifeVest given reduced EF. She was subsequently discharged to [facility] rehab.      The following portions of the patient's history were reviewed and updated as appropriate: allergies, current medications, past family history, past medical history, past social history, past surgical history and problem list.    Review of Systems     Review of Systems   Constitutional:  Positive for activity change and fatigue. Negative for chills and fever.   HENT:  Negative for ear pain and sore throat.    Eyes:  Negative for pain and visual disturbance.   Respiratory:  Negative for cough and shortness of breath.    Cardiovascular:  Negative for chest pain and palpitations.   Gastrointestinal:  Positive for diarrhea. Negative for abdominal pain and vomiting.   Genitourinary:  Negative for dysuria and hematuria.   Musculoskeletal:  Positive for arthralgias, back pain and gait problem.   Skin:  Negative for color change and rash.   Neurological:  Positive for weakness. Negative for seizures and syncope.   All other systems reviewed and are negative.       History     Past  Medical History:   Diagnosis Date    Acute congestive heart failure ([facility]) 2021    Anxiety     Cardiac disease     Chest pain 2021    Chronic pain disorder     COPD (chronic obstructive pulmonary disease) ([facility])     COVID-19 02/15/2024    Depression     Heart disease     Hyperlipidemia     Hypertension     MI (myocardial infarction) ([facility])     MRSA (methicillin resistant Staphylococcus aureus)     Renal disorder     benign kidney tumor     Past Surgical History:   Procedure Laterality Date    APPENDECTOMY      CARDIAC CATHETERIZATION N/A 2024    Procedure: Cardiac catheterization;  Surgeon: Luke Malagon MD;  Location: [facility];  Service: Cardiology    CARDIAC CATHETERIZATION N/A 2024    Procedure: Cardiac Coronary Angiogram;  Surgeon: Luke Malagon MD;  Location: [facility];  Service: Cardiology    CARDIAC CATHETERIZATION Left 2024    Procedure: Cardiac Left Heart Cath;  Surgeon: Luke Malagon MD;  Location: [facility];  Service: Cardiology    ELBOW BURSA SURGERY Left 2018    D/T MRSA INFECTION    IR THORACENTESIS  2024    SPINAL FUSION      L7 L9     Family History   Problem Relation Age of Onset    Heart disease Mother     Cancer Father      Social History     Socioeconomic History    Marital status:      Spouse name: Not on file    Number of children: 3    Years of education: 13    Highest education level: High school graduate   Occupational History    Occupation:      Employer: [employer]     Comment: retired    Tobacco Use    Smoking status: Former     Current packs/day: 0.00     Average packs/day: 1 pack/day for 60.0 years (60.0 ttl pk-yrs)     Types: Cigarettes     Start date:      Quit date: 2016     Years since quittin.3    Smokeless tobacco: Never    Tobacco comments:     She has close to a 60 pack-year smoking history, most recently has cut down to 4-5 cigarettes daily   Vaping Use     Vaping status: Never Used   Substance and Sexual Activity    Alcohol use: Never    Drug use: No    Sexual activity: Not Currently   Other Topics Concern    Not on file   Social History Narrative    Not on file     Social Determinants of Health     Financial Resource Strain: Patient Declined (1/6/2024)    Received from [facility]    Overall Financial Resource Strain (CARDIA)     Difficulty of Paying Living Expenses: Patient declined   Food Insecurity: No Food Insecurity (2/19/2024)    Hunger Vital Sign     Worried About Running Out of Food in the Last Year: Never true     Ran Out of Food in the Last Year: Never true   Transportation Needs: No Transportation Needs (2/19/2024)    PRAPARE - Transportation     Lack of Transportation (Medical): No     Lack of Transportation (Non-Medical): No   Physical Activity: Patient Declined (1/6/2024)    Received from [facility]    Exercise Vital Sign     Days of Exercise per Week: Patient declined     Minutes of Exercise per Session: Patient declined   Stress: Patient Declined (1/6/2024)    Received from [facility] - Occupational Stress Questionnaire     Feeling of Stress : Patient declined   Social Connections: Patient Declined (1/6/2024)    Received from [facility]    Social Connection and Isolation Panel [NHANES]     Frequency of Communication with Friends and Family: Patient declined     Frequency of Social Gatherings with Friends and Family: Patient declined     Attends Temple Services: Patient declined     Active Member of Clubs or Organizations: Patient declined     Attends Club or Organization Meetings: Patient declined     Marital Status: Patient declined   Intimate Partner Violence: Patient Declined (1/6/2024)    Received from [facility]    Humiliation, Afraid, Rape, and Kick questionnaire     Fear of Current or Ex-Partner: Patient declined     Emotionally Abused: Patient declined      Physically Abused: Patient declined     Sexually Abused: Patient declined   Housing Stability: Low Risk  (2/19/2024)    Housing Stability Vital Sign     Unable to Pay for Housing in the Last Year: No     Number of Places Lived in the Last Year: 2     Unstable Housing in the Last Year: No     Allergies   Allergen Reactions    Sulfa Antibiotics Anaphylaxis    Iron GI Intolerance    Niacin     Statins Other (See Comments)     cramps       Objective     Vital Signs  BP: 125/76       HR:106 T:97.5    RR:18 O2Sat:98% on 4L W:125.4  Physical Exam  Vitals reviewed.   Constitutional:       Appearance: She is ill-appearing.   Cardiovascular:      Rate and Rhythm: Normal rate. Rhythm irregular.      Heart sounds: Normal heart sounds.      Comments: Life vest in place  Pulmonary:      Breath sounds: No wheezing, rhonchi or rales.      Comments: Diminshed fields  Abdominal:      General: Bowel sounds are normal. There is no distension.      Palpations: Abdomen is soft.      Tenderness: There is no abdominal tenderness.   Musculoskeletal:      Right lower leg: No edema.      Left lower leg: No edema.   Skin:     General: Skin is warm and dry.      Findings: Bruising (right arm) present.   Neurological:      Mental Status: She is alert and oriented to person, place, and time.      Motor: Weakness present.      Gait: Gait abnormal.   Psychiatric:         Mood and Affect: Mood normal.         Behavior: Behavior normal.        Pertinent Laboratory/Diagnostic Studies have been independently reviewed.      Current Medications     Current Medications Reviewed and updated in Nursing Home EMR.    Assessment and Plan     Diarrhea  Patient reports history of diarrhea r/t diverticulosis  Patient denies abdominal pain or tenderness  Will order prn Imodium  Continue to monitor for acute changes in condition    Chronic combined systolic and diastolic CHF (congestive heart failure) ([facility])  Wt Readings from Last 3 Encounters:   04/02/24 54.4 kg  "(120 lb)   03/29/24 54.3 kg (119 lb 9.6 oz)   03/25/24 60.1 kg (132 lb 9.6 oz)   Current wt 125.4, remains stable  EF 35%, patient currently has Life Vest in place will have follow up ECHO in May  Will check BMP periodically  Continue Entresto 24-26 BID  Continue Lasix 20 mg daily prn for fluid overload  Continue MAI diet  Continue to monitor for fluid overload            COPD (chronic obstructive pulmonary disease) (HCC)  Patient with history of COPD  Continue Ventolin HFA, Advair, Atrovent neb  Continue supplemental oxygen  Lungs clear but diminished upon assessment today  Sa02 94% on 4L 02 via NC  Continue to monitor for acute changes in respiratory condition    Anemia  Most recent Hgb 9.5/Hct 32, stable  Will continue to monitor CBC periodically  Monitor for signs and symptoms of bleeding  Continue to monitor patient for acute changes in condition     Ambulatory dysfunction  Maintain fall and safety precautions  Encourage use of call bell and asst devices  Continue PT/OT services  Assist with transfers, mobility, and ADLs as needed  Continue to monitor for acute changes in condition     Portions of this note may have been written with use of the previous note summary. Assessment and Plan of care have been reviewed and updated as appropriate.  In addition, voice-recognition software may have been used in the preparation of this document. Occasional wrong word or \"sound-alike\" substitutions may have occurred due to the inherent limitations of voice recognition software. Interpretation should be guided by context.      Gauri CHO  Geriatric Medicine  4/17/2024          "

## 2024-04-17 NOTE — PROGRESS NOTES
[address], [facility] [address]  [phone]  [facility]  POS [facility]  PROGRESS NOTE        NAME: Lauren Quintero  AGE: 78 y.o. SEX: female  :  1945  DATE OF ENCOUNTER: 2024    Chief Complaint   Patient seen and examined for follow up on chronic conditions.     History of Present Illness     Lauren Quintero is a patient of [facility] with acute and chronic medical conditions of CHF, CAD, hypertension, non-STEMI, chronic respiratory failure with hypoxia and hypercapnia, COPD, BR BPR, CKD, depression, anemia, generalized weakness, insomnia, abnormal chest x-ray, abnormal CTA, diverticulitis, dizziness, fatigue, loose stools, moderate protein calorie malnutrition, and ambulatory dysfunction.    The patient is being seen and examined today for follow-up on acute and chronic medical conditions. Upon examination, the patient is sitting in her bed, alert, cooperative, and in no acute distress.      The patient reports that she continues to have lower back/tailbone pain.  She denies any issues with sob and continues on supplemental oxygen at 4L. She reports that she continues to feel fatigued. She states that she is eating, drinking, and sleeping with no problem.    PT/OT have been providing therapy treatment for restorative services.     Patient was hospitalized on 2/15/2024 due to worsening shortness of breath. She was found to be COVID-positive. CT imaging showed presence of tree-in-bud opacities representing infection with some mild pulmonary edema. She also had elevated troponin .  Patient was started on dexamethasone, Remdesivir and baricitinib.  She was seen in consultation by cardiology and pulmonary services.  Echo revealed reduced LV function of 35% and mild diastolic dysfunction.  Patient was diagnosed with NSTEMI.  Patient subsequently underwent cardiac catheterization revealing triple-vessel disease 90% stenosis of LAD, 80% stenosis of mid circumflex and 70% stenosis of mid  RCA. Outpatient follow-up with CT surgery was recommended for possible CABG.  Patient had a prolonged hospital stay complicated by increased chest congestion cough and pleuritic chest pain during the later half of her stay.  Repeat chest x-ray and CT showed an opacity in right lower lobe suspected to be pneumonia versus mucous plugging causing atelectasis.  She was started on IV ceftriaxone. She was encouraged to continue incentive spirometry, flutter valve therapy.  Antibiotics were later switched to Unasyn for suspected aspiration pneumonia.  Swallowing study showed mild oropharyngeal retention but no obvious aspiration.  Patient clinically improved on Unasyn. Oxygen requirements returned to baseline of 4 L.  She had some productive cough with some hemoptysis. Hemoglobin remained stable.  Patient overall improved.  Patient was discharged with LifeVest given reduced EF. She was subsequently discharged to [facility] rehab.    The following portions of the patient's history were reviewed and updated as appropriate: allergies, current medications, past family history, past medical history, past social history, past surgical history and problem list.    Review of Systems     Review of Systems   Constitutional:  Positive for activity change and fatigue. Negative for chills and fever.   HENT:  Negative for ear pain and sore throat.    Eyes:  Negative for pain and visual disturbance.   Respiratory:  Negative for cough and shortness of breath.    Cardiovascular:  Negative for chest pain and palpitations.   Gastrointestinal:  Negative for abdominal pain and vomiting.   Genitourinary:  Negative for dysuria and hematuria.   Musculoskeletal:  Positive for arthralgias, back pain and gait problem.   Skin:  Negative for color change and rash.   Neurological:  Positive for weakness. Negative for seizures and syncope.   All other systems reviewed and are negative.       History     Past Medical History:   Diagnosis Date    Acute  congestive heart failure ([facility]) 2021    Anxiety     Cardiac disease     Chest pain 2021    Chronic pain disorder     COPD (chronic obstructive pulmonary disease) ([facility])     COVID-19 02/15/2024    Depression     Heart disease     Hyperlipidemia     Hypertension     MI (myocardial infarction) ([facility])     MRSA (methicillin resistant Staphylococcus aureus)     Renal disorder     benign kidney tumor     Past Surgical History:   Procedure Laterality Date    APPENDECTOMY      CARDIAC CATHETERIZATION N/A 2024    Procedure: Cardiac catheterization;  Surgeon: Luke Malagon MD;  Location: [facility];  Service: Cardiology    CARDIAC CATHETERIZATION N/A 2024    Procedure: Cardiac Coronary Angiogram;  Surgeon: Luke Malagon MD;  Location: [facility];  Service: Cardiology    CARDIAC CATHETERIZATION Left 2024    Procedure: Cardiac Left Heart Cath;  Surgeon: Luke Malagon MD;  Location: [facility];  Service: Cardiology    ELBOW BURSA SURGERY Left 2018    D/T MRSA INFECTION    IR THORACENTESIS  2024    SPINAL FUSION      L7 L9     Family History   Problem Relation Age of Onset    Heart disease Mother     Cancer Father      Social History     Socioeconomic History    Marital status:      Spouse name: Not on file    Number of children: 3    Years of education: 13    Highest education level: High school graduate   Occupational History    Occupation: [employer]     Employer: [employer]     Comment: retired    Tobacco Use    Smoking status: Former     Current packs/day: 0.00     Average packs/day: 1 pack/day for 60.0 years (60.0 ttl pk-yrs)     Types: Cigarettes     Start date:      Quit date: 2016     Years since quittin.3    Smokeless tobacco: Never    Tobacco comments:     She has close to a 60 pack-year smoking history, most recently has cut down to 4-5 cigarettes daily   Vaping Use    Vaping status: Never Used   Substance and  Sexual Activity    Alcohol use: Never    Drug use: No    Sexual activity: Not Currently   Other Topics Concern    Not on file   Social History Narrative    Not on file     Social Determinants of Health     Financial Resource Strain: Patient Declined (1/6/2024)    Received from [facility] [employer]    Overall Financial Resource Strain (CARDIA)     Difficulty of Paying Living Expenses: Patient declined   Food Insecurity: No Food Insecurity (2/19/2024)    Hunger Vital Sign     Worried About Running Out of Food in the Last Year: Never true     Ran Out of Food in the Last Year: Never true   Transportation Needs: No Transportation Needs (2/19/2024)    PRAPARE - Transportation     Lack of Transportation (Medical): No     Lack of Transportation (Non-Medical): No   Physical Activity: Patient Declined (1/6/2024)    Received from [facility] [employer]    Exercise Vital Sign     Days of Exercise per Week: Patient declined     Minutes of Exercise per Session: Patient declined   Stress: Patient Declined (1/6/2024)    Received from [facility]    [place] [employer] - Occupational Stress Questionnaire     Feeling of Stress : Patient declined   Social Connections: Patient Declined (1/6/2024)    Received from [facility] [employer]    Social Connection and Isolation Panel [NHANES]     Frequency of Communication with Friends and Family: Patient declined     Frequency of Social Gatherings with Friends and Family: Patient declined     Attends Spiritism Services: Patient declined     Active Member of Clubs or Organizations: Patient declined     Attends Club or Organization Meetings: Patient declined     Marital Status: Patient declined   Intimate Partner Violence: Patient Declined (1/6/2024)    Received from [facility] [employer]    Humiliation, Afraid, Rape, and Kick questionnaire     Fear of Current or Ex-Partner: Patient declined     Emotionally Abused: Patient declined     Physically Abused: Patient declined     Sexually  Abused: Patient declined   Housing Stability: Low Risk  (2/19/2024)    Housing Stability Vital Sign     Unable to Pay for Housing in the Last Year: No     Number of Places Lived in the Last Year: 2     Unstable Housing in the Last Year: No     Allergies   Allergen Reactions    Sulfa Antibiotics Anaphylaxis    Iron GI Intolerance    Niacin     Statins Other (See Comments)     cramps       Objective     Vital Signs  BP: 132/79       HR:92 T:97.5    RR:18 O2Sat:94% on 4L W:122.6  Physical Exam  Vitals reviewed.   Constitutional:       Appearance: She is ill-appearing.   Cardiovascular:      Rate and Rhythm: Normal rate. Rhythm irregular.      Heart sounds: Normal heart sounds.      Comments: Life vest in place  Pulmonary:      Breath sounds: No wheezing, rhonchi or rales.      Comments: Diminshed fields  Abdominal:      General: Bowel sounds are normal. There is no distension.      Palpations: Abdomen is soft.      Tenderness: There is no abdominal tenderness.   Musculoskeletal:      Right lower leg: No edema.      Left lower leg: No edema.   Skin:     General: Skin is warm and dry.      Findings: Bruising (right arm) present.   Neurological:      Mental Status: She is alert and oriented to person, place, and time.      Motor: Weakness present.      Gait: Gait abnormal.   Psychiatric:         Mood and Affect: Mood normal.         Behavior: Behavior normal.        Pertinent Laboratory/Diagnostic Studies have been independently reviewed.      Current Medications     Current Medications Reviewed and updated in Nursing Home EMR.    Assessment and Plan     Acute on chronic respiratory failure with hypoxia and hypercapnia (HCC)  Secondary to COPD exacerbation, COVID, and PNA  Patient received abx, remdesivir, and steroids  Continue Ventolin, Advair, and Atrovent  Continue supplemental oxygen  Patients lungs diminished but clear on assessment  Sa02 94% on 4L via NC  Continue to monitor for acute changes in respiratory  conditions    COPD (chronic obstructive pulmonary disease) ([facility])  Patient with history of COPD  Continue Ventolin HFA, Advair, Atrovent neb  Continue supplemental oxygen  Lungs clear but diminished upon assessment today  Sa02 94% on 4L 02 via NC  Continue to monitor for acute changes in respiratory condition    Non-ST elevation MI (NSTEMI) ([facility])  Patient with NSTEMI  S/p cardiac cath which revealed significant triple-vessel disease and takotsubo cardiomyopathy   Continue Metoprolol Succ 50 mg QD, Pravastatin 80 mg QD, Entresto 24-26 mg BID, ASA 81 mg QD, and Lasix prn  ECHO revealed EF 35%  Patient currently wearing Life Vest and to have f/u ECHO in May  Patient seen during weekly rounds at [facility]  Follow up with Cardiology at d/c    Essential hypertension  BP today 132/79, stable  Continue Metoprolol Succ 50 mg QD  Avoid hypotension  Will continue to monitor BP and for acute changes in condition      Chronic combined systolic and diastolic CHF (congestive heart failure) ([facility])  Wt Readings from Last 3 Encounters:   04/02/24 54.4 kg (120 lb)   03/29/24 54.3 kg (119 lb 9.6 oz)   03/25/24 60.1 kg (132 lb 9.6 oz)   Current wt 122.6, remains stable  EF 35%, patient currently has Life Vest in place will have follow up ECHO in May  Will check BMP periodically  Continue Entresto 24-26 BID  Continue Lasix 20 mg daily prn for fluid overload  Continue MAI diet  Continue to monitor for fluid overload            Major depressive disorder, recurrent episode, moderate ([facility])  Continue reassurance and supportive measures  Continue Vraylar, Viibryd, and Wellbutrin  Encourage activity and engagement  Will continue care in collaboration with psychiatric services  Continue to monitor for acute changes in condition     Portions of this note may have been written with use of the previous note summary. Assessment and Plan of care have been reviewed and updated as appropriate.  In addition, voice-recognition software may have been used in the  "preparation of this document. Occasional wrong word or \"sound-alike\" substitutions may have occurred due to the inherent limitations of voice recognition software. Interpretation should be guided by context.      Gauri CHO  Geriatric Medicine  4/12/2024          "

## 2024-04-19 DIAGNOSIS — M54.9 BACK PAIN, UNSPECIFIED BACK LOCATION, UNSPECIFIED BACK PAIN LATERALITY, UNSPECIFIED CHRONICITY: Primary | ICD-10-CM

## 2024-04-21 ENCOUNTER — TELEPHONE (OUTPATIENT)
Dept: OTHER | Facility: OTHER | Age: 79
End: 2024-04-21

## 2024-04-23 ENCOUNTER — HOSPITAL ENCOUNTER (OUTPATIENT)
Dept: CT IMAGING | Facility: HOSPITAL | Age: 79
Discharge: HOME | End: 2024-04-23
Payer: MEDICARE

## 2024-04-23 DIAGNOSIS — M54.9 BACK PAIN, UNSPECIFIED BACK LOCATION, UNSPECIFIED BACK PAIN LATERALITY, UNSPECIFIED CHRONICITY: ICD-10-CM

## 2024-04-23 PROCEDURE — 72131 CT LUMBAR SPINE W/O DYE: CPT

## 2024-04-24 ENCOUNTER — NURSING HOME VISIT (OUTPATIENT)
Dept: GERIATRICS | Facility: OTHER | Age: 79
End: 2024-04-24
Payer: MEDICARE

## 2024-04-24 DIAGNOSIS — J44.9 COPD (CHRONIC OBSTRUCTIVE PULMONARY DISEASE) (HCC): ICD-10-CM

## 2024-04-24 DIAGNOSIS — I25.118 CORONARY ARTERY DISEASE OF NATIVE ARTERY OF NATIVE HEART WITH STABLE ANGINA PECTORIS (HCC): ICD-10-CM

## 2024-04-24 DIAGNOSIS — J44.1 COPD EXACERBATION (HCC): ICD-10-CM

## 2024-04-24 DIAGNOSIS — F33.1 MAJOR DEPRESSIVE DISORDER, RECURRENT EPISODE, MODERATE (HCC): ICD-10-CM

## 2024-04-24 DIAGNOSIS — I10 ESSENTIAL HYPERTENSION: ICD-10-CM

## 2024-04-24 DIAGNOSIS — E44.0 MODERATE PROTEIN-CALORIE MALNUTRITION (HCC): ICD-10-CM

## 2024-04-24 DIAGNOSIS — G47.00 INSOMNIA, UNSPECIFIED TYPE: ICD-10-CM

## 2024-04-24 DIAGNOSIS — J44.1 COPD WITH ACUTE EXACERBATION (HCC): ICD-10-CM

## 2024-04-24 DIAGNOSIS — I50.42 CHRONIC COMBINED SYSTOLIC AND DIASTOLIC CHF (CONGESTIVE HEART FAILURE) (HCC): Primary | ICD-10-CM

## 2024-04-24 DIAGNOSIS — D64.9 ANEMIA, UNSPECIFIED TYPE: ICD-10-CM

## 2024-04-24 DIAGNOSIS — R52 PAIN: ICD-10-CM

## 2024-04-24 DIAGNOSIS — R79.89 ELEVATED TROPONIN: ICD-10-CM

## 2024-04-24 PROCEDURE — 99310 SBSQ NF CARE HIGH MDM 45: CPT | Performed by: NURSE PRACTITIONER

## 2024-04-24 NOTE — PROGRESS NOTES
"[place] [address] [facility], [facility] [address]  [phone]  [facility]  POS 31  PROGRESS NOTE        NAME: Lauren Quintero  AGE: 78 y.o. SEX: female  :  1945  DATE OF ENCOUNTER: 2024    Chief Complaint   Patient seen and examined for follow up on chronic conditions.     History of Present Illness     Lauren Quintero is a patient of [facility] with acute and chronic medical conditions of CHF, CAD, hypertension, non-STEMI, chronic respiratory failure with hypoxia and hypercapnia, COPD, BR BPR, CKD, depression, anemia, generalized weakness, insomnia, abnormal chest x-ray, abnormal CTA, diverticulitis, dizziness, fatigue, loose stools, moderate protein calorie malnutrition, and ambulatory dysfunction.    The patient is being seen and examined today for follow-up on acute and chronic medical conditions. Upon examination, the patient is sitting in her wheelchair, alert, cooperative, and in no acute distress.      The patient reports that she continues to have lower back pain.  She continues with Life vest and supplemental oxygen.  She reports that she continues to have intermittent bouts of diarrhea \"depending on the day.\"    PT/OT have been providing therapy treatment for restorative services.     Patient was hospitalized on 2/15/2024 due to worsening shortness of breath. She was found to be COVID-positive. CT imaging showed presence of tree-in-bud opacities representing infection with some mild pulmonary edema. She also had elevated troponin .  Patient was started on dexamethasone, Remdesivir and baricitinib.  She was seen in consultation by cardiology and pulmonary services.  Echo revealed reduced LV function of 35% and mild diastolic dysfunction.  Patient was diagnosed with NSTEMI.  Patient subsequently underwent cardiac catheterization revealing triple-vessel disease 90% stenosis of LAD, 80% stenosis of mid circumflex and 70% stenosis of mid RCA. Outpatient follow-up with CT surgery was " recommended for possible CABG.  Patient had a prolonged hospital stay complicated by increased chest congestion cough and pleuritic chest pain during the later half of her stay.  Repeat chest x-ray and CT showed an opacity in right lower lobe suspected to be pneumonia versus mucous plugging causing atelectasis.  She was started on IV ceftriaxone. She was encouraged to continue incentive spirometry, flutter valve therapy.  Antibiotics were later switched to Unasyn for suspected aspiration pneumonia.  Swallowing study showed mild oropharyngeal retention but no obvious aspiration.  Patient clinically improved on Unasyn. Oxygen requirements returned to baseline of 4 L.  She had some productive cough with some hemoptysis. Hemoglobin remained stable.  Patient overall improved.  Patient was discharged with LifeVest given reduced EF. She was subsequently discharged to [facility] rehab.     The following portions of the patient's history were reviewed and updated as appropriate: allergies, current medications, past family history, past medical history, past social history, past surgical history and problem list.    Review of Systems     Review of Systems   Constitutional:  Positive for activity change and fatigue. Negative for chills and fever.   HENT:  Negative for ear pain and sore throat.    Eyes:  Negative for pain and visual disturbance.   Respiratory:  Negative for cough and shortness of breath.    Cardiovascular:  Negative for chest pain and palpitations.   Gastrointestinal:  Positive for diarrhea. Negative for abdominal pain and vomiting.   Genitourinary:  Negative for dysuria and hematuria.   Musculoskeletal:  Positive for back pain and gait problem. Negative for arthralgias.   Skin:  Negative for color change and rash.   Neurological:  Positive for weakness. Negative for seizures and syncope.   Hematological:  Bruises/bleeds easily.   Psychiatric/Behavioral:  Negative for sleep disturbance.    All other systems  reviewed and are negative.       History     Past Medical History:   Diagnosis Date    Acute congestive heart failure (HCC) 2021    Anxiety     Cardiac disease     Chest pain 2021    Chronic pain disorder     COPD (chronic obstructive pulmonary disease) ([facility])     COVID-19 02/15/2024    Depression     Heart disease     Hyperlipidemia     Hypertension     MI (myocardial infarction) ([facility])     MRSA (methicillin resistant Staphylococcus aureus)     Renal disorder     benign kidney tumor     Past Surgical History:   Procedure Laterality Date    APPENDECTOMY      CARDIAC CATHETERIZATION N/A 2024    Procedure: Cardiac catheterization;  Surgeon: Luke Malagon MD;  Location: [facility];  Service: Cardiology    CARDIAC CATHETERIZATION N/A 2024    Procedure: Cardiac Coronary Angiogram;  Surgeon: Luke Malagon MD;  Location: [facility];  Service: Cardiology    CARDIAC CATHETERIZATION Left 2024    Procedure: Cardiac Left Heart Cath;  Surgeon: Luke Malagon MD;  Location: [facility];  Service: Cardiology    ELBOW BURSA SURGERY Left 2018    D/T MRSA INFECTION    IR THORACENTESIS  2024    SPINAL FUSION      L7 L9     Family History   Problem Relation Age of Onset    Heart disease Mother     Cancer Father      Social History     Socioeconomic History    Marital status:      Spouse name: Not on file    Number of children: 3    Years of education: 13    Highest education level: High school graduate   Occupational History    Occupation:      Employer: [employer]     Comment: retired    Tobacco Use    Smoking status: Former     Current packs/day: 0.00     Average packs/day: 1 pack/day for 60.0 years (60.0 ttl pk-yrs)     Types: Cigarettes     Start date:      Quit date: 2016     Years since quittin.3    Smokeless tobacco: Never    Tobacco comments:     She has close to a 60 pack-year smoking history, most recently  has cut down to 4-5 cigarettes daily   Vaping Use    Vaping status: Never Used   Substance and Sexual Activity    Alcohol use: Never    Drug use: No    Sexual activity: Not Currently   Other Topics Concern    Not on file   Social History Narrative    Not on file     Social Determinants of Health     Financial Resource Strain: Patient Declined (1/6/2024)    Received from [facility]    Overall Financial Resource Strain (CARDIA)     Difficulty of Paying Living Expenses: Patient declined   Food Insecurity: No Food Insecurity (2/19/2024)    Hunger Vital Sign     Worried About Running Out of Food in the Last Year: Never true     Ran Out of Food in the Last Year: Never true   Transportation Needs: No Transportation Needs (2/19/2024)    PRAPARE - Transportation     Lack of Transportation (Medical): No     Lack of Transportation (Non-Medical): No   Physical Activity: Patient Declined (1/6/2024)    Received from [facility]    Exercise Vital Sign     Days of Exercise per Week: Patient declined     Minutes of Exercise per Session: Patient declined   Stress: Patient Declined (1/6/2024)    Received from [facility]    [employer] - Occupational Stress Questionnaire     Feeling of Stress : Patient declined   Social Connections: Patient Declined (1/6/2024)    Received from [facility]    Social Connection and Isolation Panel [NHANES]     Frequency of Communication with Friends and Family: Patient declined     Frequency of Social Gatherings with Friends and Family: Patient declined     Attends [employer]ism Services: Patient declined     Active Member of Clubs or Organizations: Patient declined     Attends Club or Organization Meetings: Patient declined     Marital Status: Patient declined   Intimate Partner Violence: Patient Declined (1/6/2024)    Received from [facility]    Humiliation, Afraid, Rape, and Kick questionnaire     Fear of Current or Ex-Partner: Patient declined      Emotionally Abused: Patient declined     Physically Abused: Patient declined     Sexually Abused: Patient declined   Housing Stability: Low Risk  (2/19/2024)    Housing Stability Vital Sign     Unable to Pay for Housing in the Last Year: No     Number of Places Lived in the Last Year: 2     Unstable Housing in the Last Year: No     Allergies   Allergen Reactions    Sulfa Antibiotics Anaphylaxis    Iron GI Intolerance    Niacin     Statins Other (See Comments)     cramps       Objective     Vital Signs  BP: 134/78       HR:85 T:97.8    RR:18 O2Sat:95% on 4L W:120.4  Physical Exam  Vitals reviewed.   Constitutional:       Appearance: She is ill-appearing.   Cardiovascular:      Rate and Rhythm: Normal rate. Rhythm irregular.      Heart sounds: Normal heart sounds.      Comments: Life vest in place  Pulmonary:      Breath sounds: No wheezing, rhonchi or rales.      Comments: Diminshed fields  Abdominal:      General: Bowel sounds are normal. There is no distension.      Palpations: Abdomen is soft.      Tenderness: There is no abdominal tenderness.   Musculoskeletal:      Right lower leg: No edema.      Left lower leg: No edema.   Skin:     General: Skin is warm and dry.      Findings: Bruising (right arm) present.   Neurological:      Mental Status: She is alert and oriented to person, place, and time.      Motor: Weakness present.      Gait: Gait abnormal.   Psychiatric:         Mood and Affect: Mood normal.         Behavior: Behavior normal.        Pertinent Laboratory/Diagnostic Studies have been independently reviewed.      Current Medications     Current Medications Reviewed and updated in Nursing Home EMR.    Assessment and Plan     Chronic combined systolic and diastolic CHF (congestive heart failure) (HCC)  Wt Readings from Last 3 Encounters:   04/02/24 54.4 kg (120 lb)   03/29/24 54.3 kg (119 lb 9.6 oz)   03/25/24 60.1 kg (132 lb 9.6 oz)   Current wt 120.4, remains stable  EF 35%, patient currently has Life  Vest in place will have follow up ECHO in May  Will check BMP periodically  Continue Entresto 24-26 BID  Continue Lasix 20 mg daily prn for fluid overload  Continue MAI diet  Continue to monitor for fluid overload            Essential hypertension  BP today 134/78, stable  Continue Metoprolol Succ 50 mg QD  Avoid hypotension  Will continue to monitor BP and for acute changes in condition      Coronary artery disease of native artery of native heart with stable angina pectoris (HCC)  Continue ASA 81 mg and Pravastatin 80 mg   Continue to monitor for acute changes in condition    COPD (chronic obstructive pulmonary disease) (HCC)  Patient with history of COPD  Continue Ventolin HFA, Advair, Atrovent neb  Continue supplemental oxygen  Lungs clear but diminished upon assessment today  Sa02 92% on 4L 02 via NC  Continue to monitor for acute changes in respiratory condition    Major depressive disorder, recurrent episode, moderate (HCC)  Continue reassurance and supportive measures  Continue Vraylar, Viibryd, and Wellbutrin  Encourage activity and engagement  Will continue care in collaboration with psychiatric services  Continue to monitor for acute changes in condition     Anemia  Most recent Hgb 9.5/Hct 32, stable  Will continue to monitor CBC periodically  Monitor for signs and symptoms of bleeding  Continue to monitor patient for acute changes in condition     Pain  Patient reports back pain  Continue Percocet and Gabapentin  Continue to monitor for acute changes in condition    Insomnia  Continue Melatonin 5 mg HS  Limit interruptions at night as medically necessary  Continue to maintain proper sleep-wake cycle  Continue to monitor for acute changes in condition     Moderate protein-calorie malnutrition (HCC)  Malnutrition Findings:   Multifactorial including acute illness, inadequate energy, weight loss, muscle wasting, and fat loss  Continue to monitor weight and BMI   Continue all nutritional supplements  Consult  "nutritional services prn  Monitor for acute changes in condition       Portions of this note may have been written with use of the previous note summary. Assessment and Plan of care have been reviewed and updated as appropriate.  In addition, voice-recognition software may have been used in the preparation of this document. Occasional wrong word or \"sound-alike\" substitutions may have occurred due to the inherent limitations of voice recognition software. Interpretation should be guided by context.      Gauri CHO  Geriatric Medicine  4/24/2024          "

## 2024-04-26 RX ORDER — METOPROLOL SUCCINATE 50 MG/1
50 TABLET, EXTENDED RELEASE ORAL DAILY
Qty: 15 TABLET | Refills: 0 | Status: ON HOLD | OUTPATIENT
Start: 2024-04-29 | End: 2024-05-14

## 2024-04-26 RX ORDER — GABAPENTIN 100 MG/1
100 CAPSULE ORAL
Qty: 15 CAPSULE | Refills: 0 | Status: ON HOLD | OUTPATIENT
Start: 2024-04-29 | End: 2024-05-14

## 2024-04-26 RX ORDER — SACUBITRIL AND VALSARTAN 24; 26 MG/1; MG/1
1 TABLET, FILM COATED ORAL 2 TIMES DAILY
Qty: 30 TABLET | Refills: 0 | Status: ON HOLD | OUTPATIENT
Start: 2024-04-29 | End: 2024-05-14

## 2024-04-26 RX ORDER — BUPROPION HYDROCHLORIDE 75 MG/1
75 TABLET ORAL DAILY
Qty: 15 TABLET | Refills: 0 | Status: ON HOLD | OUTPATIENT
Start: 2024-04-29 | End: 2024-05-14

## 2024-04-26 RX ORDER — VILAZODONE HYDROCHLORIDE 40 MG/1
40 TABLET ORAL
Qty: 15 TABLET | Refills: 0 | Status: ON HOLD | OUTPATIENT
Start: 2024-04-29 | End: 2024-05-14

## 2024-04-26 RX ORDER — NITROGLYCERIN 0.4 MG/1
0.4 TABLET SUBLINGUAL
Qty: 15 TABLET | Refills: 0 | Status: ON HOLD | OUTPATIENT
Start: 2024-04-29 | End: 2024-05-14

## 2024-04-26 RX ORDER — FLUTICASONE PROPIONATE AND SALMETEROL 250; 50 UG/1; UG/1
1 POWDER RESPIRATORY (INHALATION) 2 TIMES DAILY
Qty: 60 BLISTER | Refills: 0 | Status: ON HOLD | OUTPATIENT
Start: 2024-04-29 | End: 2024-05-29

## 2024-04-26 RX ORDER — ALBUTEROL SULFATE 90 UG/1
2 AEROSOL, METERED RESPIRATORY (INHALATION) EVERY 6 HOURS PRN
Qty: 18 G | Refills: 0 | Status: ON HOLD | OUTPATIENT
Start: 2024-04-29 | End: 2024-05-29

## 2024-04-26 RX ORDER — PRAVASTATIN SODIUM 80 MG/1
80 TABLET ORAL EVERY EVENING
Qty: 15 TABLET | Refills: 0 | Status: ON HOLD | OUTPATIENT
Start: 2024-04-29 | End: 2024-05-14

## 2024-04-26 RX ORDER — CALCIUM CARBONATE 500(1250)
500 TABLET ORAL 2 TIMES DAILY
Qty: 30 TABLET | Refills: 0 | Status: ON HOLD | OUTPATIENT
Start: 2024-04-29 | End: 2024-05-14

## 2024-04-26 RX ORDER — CHOLECALCIFEROL (VITAMIN D3) 125 MCG
5 CAPSULE ORAL
Qty: 15 TABLET | Refills: 0 | Status: ON HOLD | OUTPATIENT
Start: 2024-04-29 | End: 2024-05-14

## 2024-04-26 RX ORDER — PANTOPRAZOLE SODIUM 40 MG/1
40 TABLET, DELAYED RELEASE ORAL DAILY
Qty: 15 TABLET | Refills: 0 | Status: ON HOLD | OUTPATIENT
Start: 2024-04-29 | End: 2024-05-14

## 2024-04-28 PROBLEM — R19.7 DIARRHEA: Status: ACTIVE | Noted: 2024-04-17

## 2024-04-28 NOTE — ASSESSMENT & PLAN NOTE
Secondary to COPD exacerbation, COVID, and PNA  Patient received abx, remdesivir, and steroids  Continue Ventolin, Advair, and Atrovent  Continue supplemental oxygen  Patients lungs diminished but clear on assessment  Sa02 94% on 4L via NC  Continue to monitor for acute changes in respiratory conditions

## 2024-04-28 NOTE — ASSESSMENT & PLAN NOTE
Patient with history of COPD  Continue Ventolin HFA, Advair, Atrovent neb  Continue supplemental oxygen  Lungs clear but diminished upon assessment today  Sa02 94% on 4L 02 via NC  Continue to monitor for acute changes in respiratory condition

## 2024-04-28 NOTE — ASSESSMENT & PLAN NOTE
Wt Readings from Last 3 Encounters:   04/02/24 54.4 kg (120 lb)   03/29/24 54.3 kg (119 lb 9.6 oz)   03/25/24 60.1 kg (132 lb 9.6 oz)   Current wt 120.4, remains stable  EF 35%, patient currently has Life Vest in place will have follow up ECHO in May  Will check BMP periodically  Continue Entresto 24-26 BID  Continue Lasix 20 mg daily prn for fluid overload  Continue MAI diet  Continue to monitor for fluid overload

## 2024-04-28 NOTE — ASSESSMENT & PLAN NOTE
BP today 132/79, stable  Continue Metoprolol Succ 50 mg QD  Avoid hypotension  Will continue to monitor BP and for acute changes in condition

## 2024-04-28 NOTE — ASSESSMENT & PLAN NOTE
Continue reassurance and supportive measures  Continue Vraylar, Viibryd, and Wellbutrin  Encourage activity and engagement  Will continue care in collaboration with psychiatric services  Continue to monitor for acute changes in condition

## 2024-04-28 NOTE — ASSESSMENT & PLAN NOTE
BP today 134/78, stable  Continue Metoprolol Succ 50 mg QD  Avoid hypotension  Will continue to monitor BP and for acute changes in condition

## 2024-04-28 NOTE — ASSESSMENT & PLAN NOTE
Continue Melatonin 5 mg HS  Limit interruptions at night as medically necessary  Continue to maintain proper sleep-wake cycle  Continue to monitor for acute changes in condition

## 2024-04-28 NOTE — ASSESSMENT & PLAN NOTE
Wt Readings from Last 3 Encounters:   04/02/24 54.4 kg (120 lb)   03/29/24 54.3 kg (119 lb 9.6 oz)   03/25/24 60.1 kg (132 lb 9.6 oz)   Current wt 122.6, remains stable  EF 35%, patient currently has Life Vest in place will have follow up ECHO in May  Will check BMP periodically  Continue Entresto 24-26 BID  Continue Lasix 20 mg daily prn for fluid overload  Continue MAI diet  Continue to monitor for fluid overload

## 2024-04-28 NOTE — ASSESSMENT & PLAN NOTE
Wt Readings from Last 3 Encounters:   04/02/24 54.4 kg (120 lb)   03/29/24 54.3 kg (119 lb 9.6 oz)   03/25/24 60.1 kg (132 lb 9.6 oz)   Current wt 125.4, remains stable  EF 35%, patient currently has Life Vest in place will have follow up ECHO in May  Will check BMP periodically  Continue Entresto 24-26 BID  Continue Lasix 20 mg daily prn for fluid overload  Continue MAI diet  Continue to monitor for fluid overload

## 2024-04-28 NOTE — ASSESSMENT & PLAN NOTE
Maintain fall and safety precautions  Encourage use of call bell and asst devices  Continue PT/OT services  Assist with transfers, mobility, and ADLs as needed  Continue to monitor for acute changes in condition

## 2024-04-28 NOTE — ASSESSMENT & PLAN NOTE
Malnutrition Findings:   Multifactorial including acute illness, inadequate energy, weight loss, muscle wasting, and fat loss  Continue to monitor weight and BMI   Continue all nutritional supplements  Consult nutritional services prn  Monitor for acute changes in condition

## 2024-04-28 NOTE — ASSESSMENT & PLAN NOTE
Most recent Hgb 9.5/Hct 32, stable  Will continue to monitor CBC periodically  Monitor for signs and symptoms of bleeding  Continue to monitor patient for acute changes in condition

## 2024-04-28 NOTE — ASSESSMENT & PLAN NOTE
Patient reports back pain  Continue Percocet and Gabapentin  Continue to monitor for acute changes in condition

## 2024-04-28 NOTE — ASSESSMENT & PLAN NOTE
Patient with NSTEMI  S/p cardiac cath which revealed significant triple-vessel disease and takotsubo cardiomyopathy   Continue Metoprolol Succ 50 mg QD, Pravastatin 80 mg QD, Entresto 24-26 mg BID, ASA 81 mg QD, and Lasix prn  ECHO revealed EF 35%  Patient currently wearing Life Vest and to have f/u ECHO in May  Patient seen during weekly rounds at [facility]  Follow up with Cardiology at d/c

## 2024-04-28 NOTE — ASSESSMENT & PLAN NOTE
Patient reports history of diarrhea r/t diverticulosis  Patient denies abdominal pain or tenderness  Will order prn Imodium  Continue to monitor for acute changes in condition

## 2024-04-28 NOTE — ASSESSMENT & PLAN NOTE
Patient with history of COPD  Continue Ventolin HFA, Advair, Atrovent neb  Continue supplemental oxygen  Lungs clear but diminished upon assessment today  Sa02 92% on 4L 02 via NC  Continue to monitor for acute changes in respiratory condition

## 2024-04-29 ENCOUNTER — NURSING HOME VISIT (OUTPATIENT)
Dept: GERIATRICS | Facility: OTHER | Age: 79
End: 2024-04-29
Payer: MEDICARE

## 2024-04-29 DIAGNOSIS — I50.42 CHRONIC COMBINED SYSTOLIC AND DIASTOLIC CHF (CONGESTIVE HEART FAILURE) (HCC): ICD-10-CM

## 2024-04-29 DIAGNOSIS — R26.2 AMBULATORY DYSFUNCTION: ICD-10-CM

## 2024-04-29 DIAGNOSIS — F33.1 MAJOR DEPRESSIVE DISORDER, RECURRENT EPISODE, MODERATE (HCC): ICD-10-CM

## 2024-04-29 DIAGNOSIS — G47.00 INSOMNIA, UNSPECIFIED TYPE: ICD-10-CM

## 2024-04-29 DIAGNOSIS — J96.21 ACUTE ON CHRONIC RESPIRATORY FAILURE WITH HYPOXIA AND HYPERCAPNIA (HCC): Primary | ICD-10-CM

## 2024-04-29 DIAGNOSIS — K59.1 FUNCTIONAL DIARRHEA: ICD-10-CM

## 2024-04-29 DIAGNOSIS — I21.4 NON-ST ELEVATION MI (NSTEMI) (HCC): ICD-10-CM

## 2024-04-29 DIAGNOSIS — J96.22 ACUTE ON CHRONIC RESPIRATORY FAILURE WITH HYPOXIA AND HYPERCAPNIA (HCC): Primary | ICD-10-CM

## 2024-04-29 DIAGNOSIS — J44.9 CHRONIC OBSTRUCTIVE PULMONARY DISEASE, UNSPECIFIED COPD TYPE (HCC): ICD-10-CM

## 2024-04-29 DIAGNOSIS — I10 ESSENTIAL HYPERTENSION: ICD-10-CM

## 2024-04-29 PROCEDURE — 99316 NF DSCHRG MGMT 30 MIN+: CPT | Performed by: NURSE PRACTITIONER

## 2024-04-29 NOTE — ASSESSMENT & PLAN NOTE
Continue Melatonin 5 mg HS  Limit interruptions at night as medically necessary  Continue to maintain proper sleep-wake cycle  Follow up with PCP upon d/c

## 2024-04-29 NOTE — ASSESSMENT & PLAN NOTE
BP today remained stable throughout STR stay  Continue Metoprolol Succ 50 mg QD  Follow up with Cardiology/PCP upon d/c

## 2024-04-29 NOTE — ASSESSMENT & PLAN NOTE
Continue reassurance and supportive measures  Continue Vraylar, Viibryd, and Wellbutrin  Encourage activity and engagement  Follow up with psych/pcp upon d/c

## 2024-04-29 NOTE — ASSESSMENT & PLAN NOTE
Wt Readings from Last 3 Encounters:   04/02/24 54.4 kg (120 lb)   03/29/24 54.3 kg (119 lb 9.6 oz)   03/25/24 60.1 kg (132 lb 9.6 oz)   Current wt 124.3, remains stable  EF 35%, patient currently has Life Vest in place will have follow up ECHO in May  Will check BMP periodically  Continue Entresto 24-26 BID  Continue MAI diet  Follow up with Cardiology/PCP

## 2024-04-29 NOTE — ASSESSMENT & PLAN NOTE
Patient with NSTEMI  S/p cardiac cath which revealed significant triple-vessel disease and takotsubo cardiomyopathy   Continue Metoprolol Succ 50 mg QD, Pravastatin 80 mg QD, Entresto 24-26 mg BID, ASA 81 mg QD, and Lasix prn  ECHO revealed EF 35%  Patient currently wearing Life Vest and to have f/u ECHO in May  Patient seen during Cardiology rounds at [facility]  Follow up with Cardiology upon d/c

## 2024-04-29 NOTE — ASSESSMENT & PLAN NOTE
Patient with history of COPD  Continue Ventolin HFA, Advair, Atrovent neb  Continue supplemental oxygen  Lungs clear upon assessment today  Sa02 95% on 4L 02 via NC  No recent COPD exacerbations during STR stay  Patient was seen during Pulm rounds during STR stay  Follow up with PCP/Pulmonary

## 2024-04-29 NOTE — ASSESSMENT & PLAN NOTE
Secondary to COPD exacerbation, COVID, and PNA now resolved  Patient received abx, remdesivir, and steroids  Continue Ventolin, Advair, and Atrovent  Continue supplemental oxygen  Patient with no recent COPD exacerbations during STR stay  Patients lungs clear on assessment today  Sa02 95% on 4L via NC  Patient was seen during Pulm rounds at [facility]  Follow up with PCP/Pulmonary upon d/c

## 2024-04-29 NOTE — ASSESSMENT & PLAN NOTE
Patient reports history of diarrhea r/t diverticulosis  Patient denies abdominal pain or tenderness  Continue prn Imodium  Follow up with GI/PCP upon d/c

## 2024-04-29 NOTE — PROGRESS NOTES
[address], [facility] [address]  [phone]  [facility]  POS 31  Discharge Summary        NAME: Lauren Quintero  AGE: 78 y.o. SEX: female  :  1945  DATE OF ENCOUNTER: 2024    Chief Complaint   Patient seen and examined for follow up on discharge.    History of Present Illness     Lauren Quintero is a patient of [facility] with acute and chronic medical conditions of CHF, CAD, hypertension, non-STEMI, chronic respiratory failure with hypoxia and hypercapnia, COPD, BR BPR, CKD, depression, anemia, generalized weakness, insomnia, abnormal chest x-ray, abnormal CTA, diverticulitis, dizziness, fatigue, loose stools, moderate protein calorie malnutrition, and ambulatory dysfunction.    The patient is being seen and examined today for discharge. Upon examination, the patient is sitting in her bed, alert, cooperative, and in no acute distress.      The patient reports that she is doing well today with no further questions or concerns at this time.    I made patient aware that remainder of medication and prescriptions will be available with discharge instructions.  I also made patient aware of importance of all follow up appointments.    During the patient's STR stay the patient received PT/OT treatment for restorative services.  The patient will be discharged with PT, OT, and nursing services with a community home health agency.    Patient was hospitalized on 2/15/2024 due to worsening shortness of breath. She was found to be COVID-positive. CT imaging showed presence of tree-in-bud opacities representing infection with some mild pulmonary edema. She also had elevated troponin .  Patient was started on dexamethasone, Remdesivir and baricitinib.  She was seen in consultation by cardiology and pulmonary services.  Echo revealed reduced LV function of 35% and mild diastolic dysfunction.  Patient was diagnosed with NSTEMI.  Patient subsequently underwent cardiac catheterization  revealing triple-vessel disease 90% stenosis of LAD, 80% stenosis of mid circumflex and 70% stenosis of mid RCA. Outpatient follow-up with CT surgery was recommended for possible CABG.  Patient had a prolonged hospital stay complicated by increased chest congestion cough and pleuritic chest pain during the later half of her stay.  Repeat chest x-ray and CT showed an opacity in right lower lobe suspected to be pneumonia versus mucous plugging causing atelectasis.  She was started on IV ceftriaxone. She was encouraged to continue incentive spirometry, flutter valve therapy.  Antibiotics were later switched to Unasyn for suspected aspiration pneumonia.  Swallowing study showed mild oropharyngeal retention but no obvious aspiration.  Patient clinically improved on Unasyn. Oxygen requirements returned to baseline of 4 L.  She had some productive cough with some hemoptysis. Hemoglobin remained stable.  Patient overall improved.  Patient was discharged with LifeVest given reduced EF. She was subsequently discharged to [facility] rehab.     The following portions of the patient's history were reviewed and updated as appropriate: allergies, current medications, past family history, past medical history, past social history, past surgical history and problem list.    Review of Systems     Review of Systems   Constitutional:  Positive for activity change and fatigue. Negative for chills and fever.   HENT:  Negative for ear pain and sore throat.    Eyes:  Negative for pain and visual disturbance.   Respiratory:  Negative for cough and shortness of breath.    Cardiovascular:  Negative for chest pain and palpitations.   Gastrointestinal:  Positive for diarrhea. Negative for abdominal pain and vomiting.   Genitourinary:  Negative for dysuria and hematuria.   Musculoskeletal:  Positive for back pain and gait problem. Negative for arthralgias.   Skin:  Negative for color change and rash.   Neurological:  Positive for weakness.  Negative for seizures and syncope.   Hematological:  Bruises/bleeds easily.   Psychiatric/Behavioral:  Negative for sleep disturbance.    All other systems reviewed and are negative.       History     Past Medical History:   Diagnosis Date    Acute congestive heart failure (HCC) 08/27/2021    Anxiety     Cardiac disease     Chest pain 08/27/2021    Chronic pain disorder     COPD (chronic obstructive pulmonary disease) ([facility])     COVID-19 02/15/2024    Depression     Heart disease     Hyperlipidemia     Hypertension     MI (myocardial infarction) ([facility])     MRSA (methicillin resistant Staphylococcus aureus)     Renal disorder     benign kidney tumor     Past Surgical History:   Procedure Laterality Date    APPENDECTOMY      CARDIAC CATHETERIZATION N/A 2/21/2024    Procedure: Cardiac catheterization;  Surgeon: Luke Malagon MD;  Location: [facility];  Service: Cardiology    CARDIAC CATHETERIZATION N/A 2/21/2024    Procedure: Cardiac Coronary Angiogram;  Surgeon: Luke Malagon MD;  Location: [facility];  Service: Cardiology    CARDIAC CATHETERIZATION Left 2/21/2024    Procedure: Cardiac Left Heart Cath;  Surgeon: Luke Malagon MD;  Location: [facility];  Service: Cardiology    ELBOW BURSA SURGERY Left 02/2018    D/T MRSA INFECTION    IR THORACENTESIS  2/27/2024    SPINAL FUSION      L7 L9     Family History   Problem Relation Age of Onset    Heart disease Mother     Cancer Father      Social History     Socioeconomic History    Marital status:      Spouse name: Not on file    Number of children: 3    Years of education: 13    Highest education level: High school graduate   Occupational History    Occupation:      Employer: [employer]     Comment: retired    Tobacco Use    Smoking status: Former     Current packs/day: 0.00     Average packs/day: 1 pack/day for 60.0 years (60.0 ttl pk-yrs)     Types: Cigarettes     Start date: 1956     Quit  date: 2016     Years since quittin.3    Smokeless tobacco: Never    Tobacco comments:     She has close to a 60 pack-year smoking history, most recently has cut down to 4-5 cigarettes daily   Vaping Use    Vaping status: Never Used   Substance and Sexual Activity    Alcohol use: Never    Drug use: No    Sexual activity: Not Currently   Other Topics Concern    Not on file   Social History Narrative    Not on file     Social Determinants of Health     Financial Resource Strain: Patient Declined (2024)    Received from [facility]    Overall Financial Resource Strain (CARDIA)     Difficulty of Paying Living Expenses: Patient declined   Food Insecurity: No Food Insecurity (2024)    Hunger Vital Sign     Worried About Running Out of Food in the Last Year: Never true     Ran Out of Food in the Last Year: Never true   Transportation Needs: No Transportation Needs (2024)    PRAPARE - Transportation     Lack of Transportation (Medical): No     Lack of Transportation (Non-Medical): No   Physical Activity: Patient Declined (2024)    Received from [facility]    Exercise Vital Sign     Days of Exercise per Week: Patient declined     Minutes of Exercise per Session: Patient declined   Stress: Patient Declined (2024)    Received from [facility]    [place] [employer] - Occupational Stress Questionnaire     Feeling of Stress : Patient declined   Social Connections: Patient Declined (2024)    Received from [facility]    Social Connection and Isolation Panel [NHANES]     Frequency of Communication with Friends and Family: Patient declined     Frequency of Social Gatherings with Friends and Family: Patient declined     Attends Mosque Services: Patient declined     Active Member of Clubs or Organizations: Patient declined     Attends Club or Organization Meetings: Patient declined     Marital Status: Patient declined   Intimate Partner Violence: Patient  Declined (1/6/2024)    Received from [facility]    Humiliation, Afraid, Rape, and Kick questionnaire     Fear of Current or Ex-Partner: Patient declined     Emotionally Abused: Patient declined     Physically Abused: Patient declined     Sexually Abused: Patient declined   Housing Stability: Low Risk  (2/19/2024)    Housing Stability Vital Sign     Unable to Pay for Housing in the Last Year: No     Number of Places Lived in the Last Year: 2     Unstable Housing in the Last Year: No     Allergies   Allergen Reactions    Sulfa Antibiotics Anaphylaxis    Iron GI Intolerance    Niacin     Statins Other (See Comments)     cramps       Objective     Vital Signs  BP: 129/74       HR:86 T:97.4    RR:18 O2Sat:95% on 4L W:124.3  Physical Exam  Vitals reviewed.   Constitutional:       Appearance: She is ill-appearing.   Cardiovascular:      Rate and Rhythm: Normal rate. Rhythm irregular.      Heart sounds: Normal heart sounds.      Comments: Life vest in place  Pulmonary:      Breath sounds: No wheezing, rhonchi or rales.      Comments: Diminshed fields  Abdominal:      General: Bowel sounds are normal. There is no distension.      Palpations: Abdomen is soft.      Tenderness: There is no abdominal tenderness.   Musculoskeletal:      Right lower leg: No edema.      Left lower leg: No edema.   Skin:     General: Skin is warm and dry.      Findings: Bruising (right arm) present.   Neurological:      Mental Status: She is alert and oriented to person, place, and time.      Motor: Weakness present.      Gait: Gait abnormal.   Psychiatric:         Mood and Affect: Mood normal.         Behavior: Behavior normal.        Pertinent Laboratory/Diagnostic Studies have been independently reviewed.      Current Medications     Current Medications Reviewed and updated in Nursing Home EMR.    Assessment and Plan     Acute on chronic respiratory failure with hypoxia and hypercapnia (HCC)  Secondary to COPD exacerbation, COVID, and PNA  now resolved  Patient received abx, remdesivir, and steroids  Continue Ventolin, Advair, and Atrovent  Continue supplemental oxygen  Patient with no recent COPD exacerbations during STR stay  Patients lungs clear on assessment today  Sa02 95% on 4L via NC  Patient was seen during Pulm rounds at [facility]  Follow up with PCP/Pulmonary upon d/c    COPD (chronic obstructive pulmonary disease) ([facility])  Patient with history of COPD  Continue Ventolin HFA, Advair, Atrovent neb  Continue supplemental oxygen  Lungs clear upon assessment today  Sa02 95% on 4L 02 via NC  No recent COPD exacerbations during STR stay  Patient was seen during Pulm rounds during STR stay  Follow up with PCP/Pulmonary    Non-ST elevation MI (NSTEMI) ([facility])  Patient with NSTEMI  S/p cardiac cath which revealed significant triple-vessel disease and takotsubo cardiomyopathy   Continue Metoprolol Succ 50 mg QD, Pravastatin 80 mg QD, Entresto 24-26 mg BID, ASA 81 mg QD, and Lasix prn  ECHO revealed EF 35%  Patient currently wearing Life Vest and to have f/u ECHO in May  Patient seen during Cardiology rounds at [facility]  Follow up with Cardiology upon d/c    Essential hypertension  BP today remained stable throughout STR stay  Continue Metoprolol Succ 50 mg QD  Follow up with Cardiology/PCP upon d/c      Chronic combined systolic and diastolic CHF (congestive heart failure) ([facility])  Wt Readings from Last 3 Encounters:   04/02/24 54.4 kg (120 lb)   03/29/24 54.3 kg (119 lb 9.6 oz)   03/25/24 60.1 kg (132 lb 9.6 oz)   Current wt 124.3, remains stable  EF 35%, patient currently has Life Vest in place will have follow up ECHO in May  Will check BMP periodically  Continue Entresto 24-26 BID  Continue MAI diet  Follow up with Cardiology/PCP                  Insomnia  Continue Melatonin 5 mg HS  Limit interruptions at night as medically necessary  Continue to maintain proper sleep-wake cycle  Follow up with PCP upon d/c    Diarrhea  Patient reports history of diarrhea r/t  "diverticulosis  Patient denies abdominal pain or tenderness  Continue prn Imodium  Follow up with GI/PCP upon d/c    Major depressive disorder, recurrent episode, moderate (HCC)  Continue reassurance and supportive measures  Continue Vraylar, Viibryd, and Wellbutrin  Encourage activity and engagement  Follow up with psych/pcp upon d/c    Ambulatory dysfunction  Maintain fall and safety precautions  Encourage use of  asst devices  Continue PT/OT services with [facility]  Assist with transfers, mobility, and ADLs as needed  Follow up with PCP upon d/c         Portions of this note may have been written with use of the previous note summary. Assessment and Plan of care have been reviewed and updated as appropriate.  In addition, voice-recognition software may have been used in the preparation of this document. Occasional wrong word or \"sound-alike\" substitutions may have occurred due to the inherent limitations of voice recognition software. Interpretation should be guided by context.      Gauri CHO  Geriatric Medicine  4/29/2024          "

## 2024-04-29 NOTE — ASSESSMENT & PLAN NOTE
Maintain fall and safety precautions  Encourage use of  asst devices  Continue PT/OT services with [facility]  Assist with transfers, mobility, and ADLs as needed  Follow up with PCP upon d/c

## 2024-04-30 ENCOUNTER — TELEPHONE (OUTPATIENT)
Age: 79
End: 2024-04-30

## 2024-04-30 NOTE — TELEPHONE ENCOUNTER
"Alexandra from [facility] called to report to Dr. Muhammad \"the CT Scan Lumbar Spine has significant finding. I am supposed to call the doctor and let them know the report is ready.\"     Sending to Provider to Review as well as TT.  "

## 2024-05-02 ENCOUNTER — HOSPITAL ENCOUNTER (INPATIENT)
Facility: HOSPITAL | Age: 79
LOS: 13 days | Discharge: NURSING, REHAB OR LONG-TERM CARE | DRG: 871 | End: 2024-05-15
Attending: EMERGENCY MEDICINE | Admitting: ANESTHESIOLOGY
Payer: MEDICARE

## 2024-05-02 ENCOUNTER — APPOINTMENT (EMERGENCY)
Dept: RADIOLOGY | Facility: HOSPITAL | Age: 79
DRG: 871 | End: 2024-05-02
Payer: MEDICARE

## 2024-05-02 ENCOUNTER — APPOINTMENT (INPATIENT)
Dept: CT IMAGING | Facility: HOSPITAL | Age: 79
DRG: 871 | End: 2024-05-02
Payer: MEDICARE

## 2024-05-02 DIAGNOSIS — N39.0 UTI (URINARY TRACT INFECTION): ICD-10-CM

## 2024-05-02 DIAGNOSIS — A41.9 SEPTIC SHOCK (HCC): Primary | ICD-10-CM

## 2024-05-02 DIAGNOSIS — N17.9 AKI (ACUTE KIDNEY INJURY) (HCC): ICD-10-CM

## 2024-05-02 DIAGNOSIS — R65.21 SEPTIC SHOCK (HCC): Primary | ICD-10-CM

## 2024-05-02 DIAGNOSIS — E83.42 HYPOMAGNESEMIA: ICD-10-CM

## 2024-05-02 DIAGNOSIS — I21.4 NON-ST ELEVATION MI (NSTEMI) (HCC): ICD-10-CM

## 2024-05-02 LAB
2HR DELTA HS TROPONIN: -7 NG/L
ALBUMIN SERPL BCP-MCNC: 2.9 G/DL (ref 3.5–5)
ALP SERPL-CCNC: 82 U/L (ref 34–104)
ALT SERPL W P-5'-P-CCNC: 3 U/L (ref 7–52)
ANION GAP SERPL CALCULATED.3IONS-SCNC: 10 MMOL/L (ref 4–13)
APTT PPP: 36 SECONDS (ref 23–37)
AST SERPL W P-5'-P-CCNC: 14 U/L (ref 13–39)
BACTERIA UR QL AUTO: ABNORMAL /HPF
BASOPHILS # BLD MANUAL: 0 THOUSAND/UL (ref 0–0.1)
BASOPHILS NFR MAR MANUAL: 0 % (ref 0–1)
BILIRUB SERPL-MCNC: 0.53 MG/DL (ref 0.2–1)
BILIRUB UR QL STRIP: NEGATIVE
BNP SERPL-MCNC: 513 PG/ML (ref 0–100)
BUN SERPL-MCNC: 54 MG/DL (ref 5–25)
CALCIUM ALBUM COR SERPL-MCNC: 8.2 MG/DL (ref 8.3–10.1)
CALCIUM SERPL-MCNC: 7.3 MG/DL (ref 8.4–10.2)
CARDIAC TROPONIN I PNL SERPL HS: 42 NG/L
CARDIAC TROPONIN I PNL SERPL HS: 49 NG/L
CHLORIDE SERPL-SCNC: 104 MMOL/L (ref 96–108)
CLARITY UR: ABNORMAL
CO2 SERPL-SCNC: 27 MMOL/L (ref 21–32)
COLOR UR: YELLOW
CREAT SERPL-MCNC: 2.99 MG/DL (ref 0.6–1.3)
EOSINOPHIL # BLD MANUAL: 0 THOUSAND/UL (ref 0–0.4)
EOSINOPHIL NFR BLD MANUAL: 0 % (ref 0–6)
ERYTHROCYTE [DISTWIDTH] IN BLOOD BY AUTOMATED COUNT: 15.2 % (ref 11.6–15.1)
FLUAV RNA RESP QL NAA+PROBE: NEGATIVE
FLUBV RNA RESP QL NAA+PROBE: NEGATIVE
GFR SERPL CREATININE-BSD FRML MDRD: 14 ML/MIN/1.73SQ M
GLUCOSE SERPL-MCNC: 117 MG/DL (ref 65–140)
GLUCOSE SERPL-MCNC: 146 MG/DL (ref 65–140)
GLUCOSE SERPL-MCNC: 171 MG/DL (ref 65–140)
GLUCOSE UR STRIP-MCNC: NEGATIVE MG/DL
GRAN CASTS #/AREA URNS LPF: ABNORMAL /[LPF]
HCT VFR BLD AUTO: 29 % (ref 34.8–46.1)
HGB BLD-MCNC: 9.2 G/DL (ref 11.5–15.4)
HGB UR QL STRIP.AUTO: ABNORMAL
INR PPP: 1.4 (ref 0.84–1.19)
KETONES UR STRIP-MCNC: NEGATIVE MG/DL
LACTATE SERPL-SCNC: 1.2 MMOL/L (ref 0.5–2)
LEUKOCYTE ESTERASE UR QL STRIP: ABNORMAL
LIPASE SERPL-CCNC: <6 U/L (ref 11–82)
LYMPHOCYTES # BLD AUTO: 1.17 THOUSAND/UL (ref 0.6–4.47)
LYMPHOCYTES # BLD AUTO: 5 % (ref 14–44)
MAGNESIUM SERPL-MCNC: 1.3 MG/DL (ref 1.9–2.7)
MCH RBC QN AUTO: 30.4 PG (ref 26.8–34.3)
MCHC RBC AUTO-ENTMCNC: 31.7 G/DL (ref 31.4–37.4)
MCV RBC AUTO: 96 FL (ref 82–98)
MONOCYTES # BLD AUTO: 1.17 THOUSAND/UL (ref 0–1.22)
MONOCYTES NFR BLD: 5 % (ref 4–12)
NEUTROPHILS # BLD MANUAL: 21 THOUSAND/UL (ref 1.85–7.62)
NEUTS BAND NFR BLD MANUAL: 17 % (ref 0–8)
NEUTS SEG NFR BLD AUTO: 73 % (ref 43–75)
NITRITE UR QL STRIP: NEGATIVE
NON-SQ EPI CELLS URNS QL MICRO: ABNORMAL /HPF
PH UR STRIP.AUTO: 5.5 [PH]
PLATELET # BLD AUTO: 152 THOUSANDS/UL (ref 149–390)
PLATELET BLD QL SMEAR: ADEQUATE
PMV BLD AUTO: 10.9 FL (ref 8.9–12.7)
POTASSIUM SERPL-SCNC: 3.3 MMOL/L (ref 3.5–5.3)
PROCALCITONIN SERPL-MCNC: 225.38 NG/ML
PROT SERPL-MCNC: 6.4 G/DL (ref 6.4–8.4)
PROT UR STRIP-MCNC: ABNORMAL MG/DL
PROTHROMBIN TIME: 17.9 SECONDS (ref 11.6–14.5)
RBC # BLD AUTO: 3.03 MILLION/UL (ref 3.81–5.12)
RBC #/AREA URNS AUTO: ABNORMAL /HPF
RSV RNA RESP QL NAA+PROBE: NEGATIVE
SARS-COV-2 RNA RESP QL NAA+PROBE: NEGATIVE
SODIUM SERPL-SCNC: 141 MMOL/L (ref 135–147)
SP GR UR STRIP.AUTO: 1.01 (ref 1–1.03)
UROBILINOGEN UR STRIP-ACNC: <2 MG/DL
WBC # BLD AUTO: 23.33 THOUSAND/UL (ref 4.31–10.16)
WBC #/AREA URNS AUTO: ABNORMAL /HPF

## 2024-05-02 PROCEDURE — 96375 TX/PRO/DX INJ NEW DRUG ADDON: CPT

## 2024-05-02 PROCEDURE — 85610 PROTHROMBIN TIME: CPT | Performed by: EMERGENCY MEDICINE

## 2024-05-02 PROCEDURE — 0241U HB NFCT DS VIR RESP RNA 4 TRGT: CPT | Performed by: EMERGENCY MEDICINE

## 2024-05-02 PROCEDURE — 99285 EMERGENCY DEPT VISIT HI MDM: CPT

## 2024-05-02 PROCEDURE — 82948 REAGENT STRIP/BLOOD GLUCOSE: CPT

## 2024-05-02 PROCEDURE — 83880 ASSAY OF NATRIURETIC PEPTIDE: CPT

## 2024-05-02 PROCEDURE — 87077 CULTURE AEROBIC IDENTIFY: CPT | Performed by: EMERGENCY MEDICINE

## 2024-05-02 PROCEDURE — 85007 BL SMEAR W/DIFF WBC COUNT: CPT | Performed by: EMERGENCY MEDICINE

## 2024-05-02 PROCEDURE — 74176 CT ABD & PELVIS W/O CONTRAST: CPT

## 2024-05-02 PROCEDURE — 71045 X-RAY EXAM CHEST 1 VIEW: CPT

## 2024-05-02 PROCEDURE — 96365 THER/PROPH/DIAG IV INF INIT: CPT

## 2024-05-02 PROCEDURE — 84484 ASSAY OF TROPONIN QUANT: CPT | Performed by: EMERGENCY MEDICINE

## 2024-05-02 PROCEDURE — 83605 ASSAY OF LACTIC ACID: CPT | Performed by: EMERGENCY MEDICINE

## 2024-05-02 PROCEDURE — 80053 COMPREHEN METABOLIC PANEL: CPT | Performed by: EMERGENCY MEDICINE

## 2024-05-02 PROCEDURE — 87147 CULTURE TYPE IMMUNOLOGIC: CPT | Performed by: EMERGENCY MEDICINE

## 2024-05-02 PROCEDURE — 87040 BLOOD CULTURE FOR BACTERIA: CPT | Performed by: EMERGENCY MEDICINE

## 2024-05-02 PROCEDURE — 81001 URINALYSIS AUTO W/SCOPE: CPT | Performed by: EMERGENCY MEDICINE

## 2024-05-02 PROCEDURE — 84145 PROCALCITONIN (PCT): CPT | Performed by: EMERGENCY MEDICINE

## 2024-05-02 PROCEDURE — 96366 THER/PROPH/DIAG IV INF ADDON: CPT

## 2024-05-02 PROCEDURE — 99291 CRITICAL CARE FIRST HOUR: CPT | Performed by: EMERGENCY MEDICINE

## 2024-05-02 PROCEDURE — 36620 INSERTION CATHETER ARTERY: CPT

## 2024-05-02 PROCEDURE — 85027 COMPLETE CBC AUTOMATED: CPT | Performed by: EMERGENCY MEDICINE

## 2024-05-02 PROCEDURE — 85730 THROMBOPLASTIN TIME PARTIAL: CPT | Performed by: EMERGENCY MEDICINE

## 2024-05-02 PROCEDURE — 87186 SC STD MICRODIL/AGAR DIL: CPT | Performed by: EMERGENCY MEDICINE

## 2024-05-02 PROCEDURE — 83690 ASSAY OF LIPASE: CPT | Performed by: EMERGENCY MEDICINE

## 2024-05-02 PROCEDURE — 36415 COLL VENOUS BLD VENIPUNCTURE: CPT | Performed by: EMERGENCY MEDICINE

## 2024-05-02 PROCEDURE — 83735 ASSAY OF MAGNESIUM: CPT | Performed by: EMERGENCY MEDICINE

## 2024-05-02 PROCEDURE — 87086 URINE CULTURE/COLONY COUNT: CPT | Performed by: EMERGENCY MEDICINE

## 2024-05-02 PROCEDURE — 99223 1ST HOSP IP/OBS HIGH 75: CPT

## 2024-05-02 PROCEDURE — 93005 ELECTROCARDIOGRAM TRACING: CPT

## 2024-05-02 PROCEDURE — 71250 CT THORAX DX C-: CPT

## 2024-05-02 PROCEDURE — 87154 CUL TYP ID BLD PTHGN 6+ TRGT: CPT | Performed by: EMERGENCY MEDICINE

## 2024-05-02 PROCEDURE — 96368 THER/DIAG CONCURRENT INF: CPT

## 2024-05-02 RX ORDER — ALBUTEROL SULFATE 2.5 MG/3ML
2.5 SOLUTION RESPIRATORY (INHALATION) EVERY 4 HOURS PRN
Status: DISCONTINUED | OUTPATIENT
Start: 2024-05-02 | End: 2024-05-15 | Stop reason: HOSPADM

## 2024-05-02 RX ORDER — LEVALBUTEROL INHALATION SOLUTION 1.25 MG/3ML
1.25 SOLUTION RESPIRATORY (INHALATION)
Status: DISCONTINUED | OUTPATIENT
Start: 2024-05-02 | End: 2024-05-03

## 2024-05-02 RX ORDER — CHLORHEXIDINE GLUCONATE ORAL RINSE 1.2 MG/ML
15 SOLUTION DENTAL EVERY 12 HOURS SCHEDULED
Status: DISCONTINUED | OUTPATIENT
Start: 2024-05-02 | End: 2024-05-03

## 2024-05-02 RX ORDER — HEPARIN SODIUM 5000 [USP'U]/ML
5000 INJECTION, SOLUTION INTRAVENOUS; SUBCUTANEOUS EVERY 8 HOURS SCHEDULED
Status: DISCONTINUED | OUTPATIENT
Start: 2024-05-02 | End: 2024-05-13

## 2024-05-02 RX ORDER — VANCOMYCIN HYDROCHLORIDE 750 MG/150ML
10 INJECTION, SOLUTION INTRAVENOUS DAILY PRN
Status: DISCONTINUED | OUTPATIENT
Start: 2024-05-03 | End: 2024-05-04

## 2024-05-02 RX ORDER — ACETAMINOPHEN 10 MG/ML
1000 INJECTION, SOLUTION INTRAVENOUS ONCE
Status: COMPLETED | OUTPATIENT
Start: 2024-05-02 | End: 2024-05-02

## 2024-05-02 RX ORDER — MAGNESIUM SULFATE HEPTAHYDRATE 40 MG/ML
2 INJECTION, SOLUTION INTRAVENOUS ONCE
Status: COMPLETED | OUTPATIENT
Start: 2024-05-02 | End: 2024-05-02

## 2024-05-02 RX ORDER — CALCIUM GLUCONATE 20 MG/ML
2 INJECTION, SOLUTION INTRAVENOUS ONCE
Status: COMPLETED | OUTPATIENT
Start: 2024-05-02 | End: 2024-05-03

## 2024-05-02 RX ORDER — METHYLPREDNISOLONE SODIUM SUCCINATE 40 MG/ML
40 INJECTION, POWDER, LYOPHILIZED, FOR SOLUTION INTRAMUSCULAR; INTRAVENOUS EVERY 8 HOURS SCHEDULED
Status: DISCONTINUED | OUTPATIENT
Start: 2024-05-02 | End: 2024-05-03

## 2024-05-02 RX ORDER — POTASSIUM CHLORIDE 14.9 MG/ML
20 INJECTION INTRAVENOUS
Status: COMPLETED | OUTPATIENT
Start: 2024-05-02 | End: 2024-05-03

## 2024-05-02 RX ORDER — BUPROPION HYDROCHLORIDE 75 MG/1
75 TABLET ORAL DAILY
Status: DISCONTINUED | OUTPATIENT
Start: 2024-05-03 | End: 2024-05-15 | Stop reason: HOSPADM

## 2024-05-02 RX ORDER — LIDOCAINE HYDROCHLORIDE 10 MG/ML
1 INJECTION, SOLUTION EPIDURAL; INFILTRATION; INTRACAUDAL; PERINEURAL ONCE
Status: COMPLETED | OUTPATIENT
Start: 2024-05-02 | End: 2024-05-03

## 2024-05-02 RX ADMIN — HEPARIN SODIUM 5000 UNITS: 5000 INJECTION INTRAVENOUS; SUBCUTANEOUS at 23:13

## 2024-05-02 RX ADMIN — VASOPRESSIN 0.04 UNITS/MIN: 20 INJECTION INTRAVENOUS at 22:12

## 2024-05-02 RX ADMIN — NOREPINEPHRINE BITARTRATE 2 MCG/MIN: 1 SOLUTION INTRAVENOUS at 19:57

## 2024-05-02 RX ADMIN — CEFEPIME 2000 MG: 2 INJECTION, POWDER, FOR SOLUTION INTRAVENOUS at 20:14

## 2024-05-02 RX ADMIN — MAGNESIUM SULFATE HEPTAHYDRATE 2 G: 40 INJECTION, SOLUTION INTRAVENOUS at 20:55

## 2024-05-02 RX ADMIN — CALCIUM GLUCONATE 2 G: 20 INJECTION, SOLUTION INTRAVENOUS at 23:07

## 2024-05-02 RX ADMIN — VANCOMYCIN HYDROCHLORIDE 1250 MG: 5 INJECTION, POWDER, LYOPHILIZED, FOR SOLUTION INTRAVENOUS at 20:56

## 2024-05-02 RX ADMIN — ACETAMINOPHEN 1000 MG: 10 INJECTION INTRAVENOUS at 19:31

## 2024-05-02 RX ADMIN — METHYLPREDNISOLONE SODIUM SUCCINATE 40 MG: 40 INJECTION, POWDER, FOR SOLUTION INTRAMUSCULAR; INTRAVENOUS at 23:13

## 2024-05-02 RX ADMIN — SODIUM CHLORIDE 500 ML: 0.9 INJECTION, SOLUTION INTRAVENOUS at 20:15

## 2024-05-02 RX ADMIN — SODIUM CHLORIDE 1000 ML: 0.9 INJECTION, SOLUTION INTRAVENOUS at 19:28

## 2024-05-02 RX ADMIN — POTASSIUM CHLORIDE 20 MEQ: 14.9 INJECTION, SOLUTION INTRAVENOUS at 23:56

## 2024-05-02 RX ADMIN — CHLORHEXIDINE GLUCONATE 0.12% ORAL RINSE 15 ML: 1.2 LIQUID ORAL at 23:13

## 2024-05-02 NOTE — ED PROVIDER NOTES
"Pt Name: Lauren Quintero  MRN: [id number]  Birthdate 1945  Age/Sex: 78 y.o. female  Date of evaluation: 5/2/2024  PCP: Starla Bateman MD    CHIEF COMPLAINT    Chief Complaint   Patient presents with    Altered Mental Status     Per EMS, pt has hx of dementia. Has an external defib on. Got released on Monday from home, pt reported feeling weak to son. Today, wasn't really moving around. Has 100 temp temporal, usually 3L at home. Last nebulizer was at 11am, hypotensive and 1,000ml given by EMS. Pt reports feeling \"terrible\".         HPI and MDM    78 y.o. female presenting with fever, generalized weakness, fatigue.  Patient has a history of dementia, she is alert and oriented to self and to place however not to time currently.  Recently discharged from rehab facility on Monday, has been living with son however has been declining.  Her blood pressure was low for EMS, she received 1 L of fluids, she had a fever.  She denies any pain or cough or runny nose.  She is on baseline 2-3 liters of oxygen.  No nausea or vomiting.  Uncertain if any sick contacts.    Son was at bedside, discussed with son who states that patient has a history of aspiration pneumonia, has had sepsis before.  She just came from rehab this Monday, she was doing well until yesterday and has been more sleepy and appetite has been low.      Differential diagnosis considered includes but not limited to UTI, viral respiratory infection, pneumonia, electrolyte abnormalities, dehydration, sepsis.    Sepsis alert was called.  Febrile, tachycardia, tachypnea.  Currently requiring 4 L of oxygen via nasal cannula.    Significant leukocytosis with bandemia.  Elevated procalcitonin.  She does have low magnesium, being given IV magnesium.    Per my independent interpretation of EKG, sinus tachycardia with heart rate 122, frequent PACs although there is a wavering baseline, narrow QRS, slightly prolonged QTc, NM reassuring, no obvious STEMI.    Per my depend " interpretation of chest x-ray, no obvious acute cardiopulmonary process, although the bottom right lung medially is blocked by the LifeVest, uncertain if there may be a consolidation underneath.    UA is concerning for infection, patient was started on broad-spectrum antibiotics.    Creatinine 2.99 concerning for acute renal failure as well.  Likely prerenal.  She did get some fluids as she did appear dry, however will need to be judicious with history of CHF with low EF.      ED Course as of 05/04/24 1240   Thu May 02, 2024   1949 Patient's blood pressure responded to IV fluids, however then dropped back to 80s systolic.  Will start pressors.  There is concern with giving her more fluids as she does have a history of congestive heart failure with last echo on 2/8/2024 revealing an EF of 35%.      Discussed with critical care for hospitalization.      Medications   aspirin (ECOTRIN LOW STRENGTH) EC tablet 81 mg (81 mg Oral Given 5/4/24 0830)   buPROPion (WELLBUTRIN) tablet 75 mg (75 mg Oral Given 5/4/24 0830)   heparin (porcine) subcutaneous injection 5,000 Units (5,000 Units Subcutaneous Given 5/4/24 0615)   ipratropium (ATROVENT) 0.02 % inhalation solution 0.5 mg (0.5 mg Nebulization Given 5/4/24 0715)   albuterol inhalation solution 2.5 mg (has no administration in time range)   cefepime (MAXIPIME) 1 g/50 mL dextrose IVPB (1,000 mg Intravenous New Bag 5/4/24 0924)   vancomycin (VANCOCIN) IVPB (premix in dextrose) 750 mg 150 mL (has no administration in time range)   vancomycin (VANCOCIN) IVPB (premix in dextrose) 750 mg 150 mL (750 mg Intravenous New Bag 5/4/24 1200)   acetaminophen (Ofirmev) injection 1,000 mg (0 mg Intravenous Stopped 5/1945)   sodium chloride 0.9 % bolus 1,000 mL (0 mL Intravenous Stopped 5/2/24 1959)   cefepime (MAXIPIME) 2 g/50 mL dextrose IVPB (0 mg Intravenous Stopped 5/2/24 2044)   vancomycin (VANCOCIN) 1,250 mg in sodium chloride 0.9 % 250 mL IVPB (0 mg Intravenous Stopped 5/2/24  2226)   magnesium sulfate 2 g/50 mL IVPB (premix) 2 g (2 g Intravenous New Bag 5/2/24 2055)   sodium chloride 0.9 % bolus 500 mL (0 mL Intravenous Stopped 5/2/24 2115)   potassium chloride 20 mEq IVPB (premix) (20 mEq Intravenous New Bag 5/3/24 0154)   calcium gluconate 2 g in sodium chloride 0.9% 100 mL (premix) (2 g Intravenous New Bag 5/2/24 2307)   lidocaine (PF) (XYLOCAINE-MPF) 1 % injection 1 mL (1 mL Infiltration Given by Other 5/3/24 0000)   albumin human (FLEXBUMIN) 5 % injection 12.5 g (0 g Intravenous Stopped 5/3/24 1001)   albumin human (FLEXBUMIN) 5 % injection 12.5 g (0 g Intravenous Stopped 5/3/24 1001)   vancomycin (VANCOCIN) IVPB (premix in dextrose) 500 mg 100 mL (0 mg Intravenous Stopped 5/3/24 1418)   calcium gluconate 2 g in sodium chloride 0.9% 100 mL (premix) (2 g Intravenous New Bag 5/3/24 1418)         Past Medical and Surgical History    Past Medical History:   Diagnosis Date    Acute congestive heart failure (HCC) 08/27/2021    Anxiety     Cardiac disease     Chest pain 08/27/2021    Chronic pain disorder     COPD (chronic obstructive pulmonary disease) ([facility])     COVID-19 02/15/2024    Depression     Heart disease     Hyperlipidemia     Hypertension     MI (myocardial infarction) ([facility])     MRSA (methicillin resistant Staphylococcus aureus)     Renal disorder     benign kidney tumor       Past Surgical History:   Procedure Laterality Date    APPENDECTOMY      CARDIAC CATHETERIZATION N/A 2/21/2024    Procedure: Cardiac catheterization;  Surgeon: Luke Malagon MD;  Location: [facility];  Service: Cardiology    CARDIAC CATHETERIZATION N/A 2/21/2024    Procedure: Cardiac Coronary Angiogram;  Surgeon: Luke Malgaon MD;  Location: [facility];  Service: Cardiology    CARDIAC CATHETERIZATION Left 2/21/2024    Procedure: Cardiac Left Heart Cath;  Surgeon: Luke Malagon MD;  Location: [facility];  Service: Cardiology    ELBOW BURSA SURGERY Left 02/2018    D/T  MRSA INFECTION    IR THORACENTESIS  2024    SPINAL FUSION      L7 L9       Family History   Problem Relation Age of Onset    Heart disease Mother     Cancer Father        Social History     Tobacco Use    Smoking status: Former     Current packs/day: 0.00     Average packs/day: 1 pack/day for 60.0 years (60.0 ttl pk-yrs)     Types: Cigarettes     Start date:      Quit date:      Years since quittin.3    Smokeless tobacco: Never    Tobacco comments:     She has close to a 60 pack-year smoking history, most recently has cut down to 4-5 cigarettes daily   Vaping Use    Vaping status: Never Used   Substance Use Topics    Alcohol use: Never    Drug use: No           Allergies    Allergies   Allergen Reactions    Sulfa Antibiotics Anaphylaxis    Iron GI Intolerance    Niacin     Statins Other (See Comments)     cramps       Home Medications    Prior to Admission medications    Medication Sig Start Date End Date Taking? Authorizing Provider   albuterol (Ventolin HFA) 90 mcg/act inhaler Inhale 2 puffs every 6 (six) hours as needed for wheezing Do not start before 2024. 24  TAMMIE Thomas   aspirin (ECOTRIN LOW STRENGTH) 81 mg EC tablet Take 1 tablet (81 mg total) by mouth daily for 15 days Do not start before 2024. 24  TAMMIE Thomas   buPROPion (WELLBUTRIN) 75 mg tablet Take 1 tablet (75 mg total) by mouth in the morning for 15 days Do not start before 2024. 24  TAMMIE Thomas   Calcium 500 MG tablet Take 1 tablet (500 mg total) by mouth 2 (two) times a day for 15 days Do not start before 2024. 24  TAMMIE Thomas   cariprazine (Vraylar) 1.5 MG capsule Take 1 capsule (1.5 mg total) by mouth daily for 15 days Do not start before 2024. 24  TAMMIE Thomas   Fluticasone-Salmeterol (Advair Diskus) 250-50  mcg/dose inhaler Inhale 1 puff 2 (two) times a day Rinse mouth after use. Do not start before April 29, 2024. 4/29/24 5/29/24  TAMMIE Thomas   gabapentin (NEURONTIN) 100 mg capsule Take 1 capsule (100 mg total) by mouth daily at bedtime for 15 days Do not start before April 29, 2024. 4/29/24 5/14/24  TAMMIE Thomas   ipratropium (ATROVENT) 0.02 % nebulizer solution Take 2.5 mL (0.5 mg total) by nebulization 3 (three) times a day for 15 days Do not start before April 29, 2024. 4/29/24 5/14/24  TAMMIE Thomas   Melatonin 5 MG TABS Take 1 tablet (5 mg total) by mouth daily at bedtime for 15 days Do not start before April 29, 2024. 4/29/24 5/14/24  TAMMIE Thomas   metoprolol succinate (TOPROL-XL) 50 mg 24 hr tablet Take 1 tablet (50 mg total) by mouth daily for 15 days Do not start before April 29, 2024. 4/29/24 5/14/24  TAMMIE Thomas   nitroglycerin (NITROSTAT) 0.4 mg SL tablet Place 1 tablet (0.4 mg total) under the tongue every 5 (five) minutes as needed for chest pain for up to 15 days Do not start before April 29, 2024. 4/29/24 5/14/24  TAMMIE Thomas   pantoprazole (PROTONIX) 40 mg tablet Take 1 tablet (40 mg total) by mouth daily for 15 doses Do not start before April 29, 2024. 4/29/24 5/14/24  TAMMIE Thomas   pravastatin (PRAVACHOL) 80 mg tablet Take 1 tablet (80 mg total) by mouth every evening for 15 days Do not start before April 29, 2024. 4/29/24 5/14/24  TAMMIE Thomas   sacubitril-valsartan (Entresto) 24-26 MG TABS Take 1 tablet by mouth 2 (two) times a day for 30 doses Do not start before April 29, 2024. 4/29/24 5/14/24  TAMMIE Thomas   vilazodone (VIIBRYD) 40 mg tablet Take 1 tablet (40 mg total) by mouth daily with breakfast for 15 days Do not start before April 29, 2024. 4/29/24 5/14/24  TAMMIE Thomas   Zanubrutinib 80  MG CAPS Take 160 mg by mouth 2 (two) times a day    Historical Provider, MD           Physical Exam      ED Triage Vitals   Temperature Pulse Respirations Blood Pressure SpO2   05/02/24 1910 05/02/24 1908 05/02/24 1908 05/02/24 1908 05/02/24 1908   (!) 100.9 °F (38.3 °C) (!) 122 (!) 24 97/54 91 %      Temp Source Heart Rate Source Patient Position - Orthostatic VS BP Location FiO2 (%)   05/02/24 1910 05/02/24 1908 05/02/24 1908 05/02/24 1908 --   Rectal Monitor Lying Right arm       Pain Score       05/02/24 2330       No Pain               Physical Exam  Constitutional:       General: She is not in acute distress.     Appearance: She is ill-appearing.   HENT:      Head: Normocephalic and atraumatic.      Nose: Nose normal.      Mouth/Throat:      Mouth: Mucous membranes are dry.   Eyes:      Extraocular Movements: Extraocular movements intact.      Pupils: Pupils are equal, round, and reactive to light.   Cardiovascular:      Rate and Rhythm: Normal rate and regular rhythm.   Pulmonary:      Effort: No respiratory distress.      Breath sounds: Normal breath sounds. No wheezing.   Abdominal:      General: There is no distension.      Palpations: Abdomen is soft.      Tenderness: There is no abdominal tenderness.   Musculoskeletal:         General: No swelling or deformity.      Cervical back: Normal range of motion and neck supple.   Skin:     General: Skin is warm.      Findings: No erythema.   Neurological:      Mental Status: She is alert.      Comments: Alert and oriented to self and place only              Diagnostic Results      Labs:    Results Reviewed       Procedure Component Value Units Date/Time    Urine culture [[id number]]  (Abnormal) Collected: 05/02/24 1944    Lab Status: Preliminary result Specimen: Urine, Straight Cath Updated: 05/04/24 1147     Urine Culture >100,000 cfu/ml Escherichia coli    Blood culture #2 [[id number]]  (Abnormal) Collected: 05/02/24 1924    Lab Status: Preliminary result  Specimen: Blood from Arm, Right Updated: 05/04/24 1144     Blood Culture Escherichia coli     Gram Stain Result Gram negative rods    Blood Culture Identification Panel [[id number]]  (Abnormal) Collected: 05/02/24 1924    Lab Status: Preliminary result Specimen: Blood from Arm, Right Updated: 05/04/24 1144     Escherichia coli Detected    Narrative:      Routine culture and susceptiblity to follow for confirmation.    Film Array panel tests for 11 gram positive organisms, 15 gram negative organisms, 7 yeast species and 10 resistance genes.     MRSA culture [[id number]]  (Normal) Collected: 05/03/24 0005    Lab Status: Final result Specimen: Nares from Nose Updated: 05/04/24 1023     MRSA Culture Only No Methicillin Resistant Staphlyococcus aureus (MRSA) isolated    Strep Pneumoniae, Urine [[id number]]  (Normal) Collected: 05/03/24 0005    Lab Status: Final result Specimen: Urine, Catheter Updated: 05/03/24 0807     Strep pneumoniae antigen, urine Negative    Legionella antigen, urine [[id number]]  (Normal) Collected: 05/03/24 0005    Lab Status: Final result Specimen: Urine, Catheter Updated: 05/03/24 0806     Legionella Urinary Antigen Negative    CBC and differential [[id number]]  (Abnormal) Collected: 05/03/24 0550    Lab Status: Final result Specimen: Blood from Line, Arterial Updated: 05/03/24 0714     WBC 18.40 Thousand/uL      RBC 2.63 Million/uL      Hemoglobin 8.0 g/dL      Hematocrit 25.8 %      MCV 98 fL      MCH 30.4 pg      MCHC 31.0 g/dL      RDW 15.4 %      MPV 11.1 fL      Platelets 120 Thousands/uL     Procalcitonin [[id number]]  (Abnormal) Collected: 05/03/24 0550    Lab Status: Final result Specimen: Blood from Line, Arterial Updated: 05/03/24 0654     Procalcitonin 141.27 ng/ml     Blood culture #1 [[id number]]  (Abnormal) Collected: 05/02/24 1925    Lab Status: Preliminary result Specimen: Blood from Arm, Left Updated: 05/03/24 0638     Gram Stain Result Gram negative rods    Lipid panel [[id number]]   (Abnormal) Collected: 05/03/24 0550    Lab Status: Final result Specimen: Blood from Line, Arterial Updated: 05/03/24 0629     Cholesterol 86 mg/dL      Triglycerides 152 mg/dL      HDL, Direct 13 mg/dL      LDL Calculated 43 mg/dL      Non-HDL-Chol (CHOL-HDL) 73 mg/dl     Comprehensive metabolic panel [[id number]]  (Abnormal) Collected: 05/03/24 0550    Lab Status: Final result Specimen: Blood from Line, Arterial Updated: 05/03/24 0629     Sodium 140 mmol/L      Potassium 4.4 mmol/L      Chloride 109 mmol/L      CO2 24 mmol/L      ANION GAP 7 mmol/L      BUN 55 mg/dL      Creatinine 2.59 mg/dL      Glucose 139 mg/dL      Calcium 7.8 mg/dL      Corrected Calcium 8.8 mg/dL      AST 13 U/L      ALT 3 U/L      Alkaline Phosphatase 63 U/L      Total Protein 6.2 g/dL      Albumin 2.8 g/dL      Total Bilirubin 0.39 mg/dL      eGFR 17 ml/min/1.73sq m     Narrative:      National Kidney Disease Foundation guidelines for Chronic Kidney Disease (CKD):     Stage 1 with normal or high GFR (GFR > 90 mL/min/1.73 square meters)    Stage 2 Mild CKD (GFR = 60-89 mL/min/1.73 square meters)    Stage 3A Moderate CKD (GFR = 45-59 mL/min/1.73 square meters)    Stage 3B Moderate CKD (GFR = 30-44 mL/min/1.73 square meters)    Stage 4 Severe CKD (GFR = 15-29 mL/min/1.73 square meters)    Stage 5 End Stage CKD (GFR <15 mL/min/1.73 square meters)  Note: GFR calculation is accurate only with a steady state creatinine    Magnesium [[id number]]  (Normal) Collected: 05/03/24 0550    Lab Status: Final result Specimen: Blood from Line, Arterial Updated: 05/03/24 0629     Magnesium 2.0 mg/dL     Phosphorus [[id number]]  (Normal) Collected: 05/03/24 0550    Lab Status: Final result Specimen: Blood from Line, Arterial Updated: 05/03/24 0629     Phosphorus 3.7 mg/dL     Protime-INR [[id number]]  (Abnormal) Collected: 05/03/24 0550    Lab Status: Final result Specimen: Blood from Line, Arterial Updated: 05/03/24 0626     Protime 17.0 seconds      INR 1.31     Calcium, ionized [[id number]]  (Abnormal) Collected: 05/03/24 0550    Lab Status: Final result Specimen: Blood from Line, Arterial Updated: 05/03/24 0608     Calcium, Ionized 1.07 mmol/L     HS Troponin I 4hr [[id number]]  (Normal) Collected: 05/03/24 0005    Lab Status: Final result Specimen: Blood from Line, Arterial Updated: 05/03/24 0055     hs TnI 4hr 39 ng/L      Delta 4hr hsTnI -10 ng/L     B-Type Natriuretic Peptide(BNP) [[id number]]  (Abnormal) Collected: 05/02/24 1924    Lab Status: Final result Specimen: Blood from Arm, Right Updated: 05/02/24 2254      pg/mL     HS Troponin I 2hr [[id number]]  (Normal) Collected: 05/02/24 2158    Lab Status: Final result Specimen: Blood from Arm, Right Updated: 05/02/24 2232     hs TnI 2hr 42 ng/L      Delta 2hr hsTnI -7 ng/L     Sputum culture and Gram stain [[id number]]     Lab Status: No result Specimen: Sputum     Procalcitonin [[id number]]  (Abnormal) Collected: 05/02/24 1924    Lab Status: Final result Specimen: Blood from Arm, Right Updated: 05/02/24 2029     Procalcitonin 225.38 ng/ml     FLU/RSV/COVID - if FLU/RSV clinically relevant [[id number]]  (Normal) Collected: 05/02/24 1929    Lab Status: Final result Specimen: Nares from Nose Updated: 05/02/24 2029     SARS-CoV-2 Negative     INFLUENZA A PCR Negative     INFLUENZA B PCR Negative     RSV PCR Negative    Narrative:      FOR PEDIATRIC PATIENTS - copy/paste COVID Guidelines URL to browser: [email or web address]    SARS-CoV-2 assay is a Nucleic Acid Amplification assay intended for the  qualitative detection of nucleic acid from SARS-CoV-2 in nasopharyngeal  swabs. Results are for the presumptive identification of SARS-CoV-2 RNA.    Positive results are indicative of infection with SARS-CoV-2, the virus  causing COVID-19, but do not rule out bacterial infection or co-infection  with other viruses. Laboratories within the [place] and  its  territories are required to report all positive results to the appropriate  public health authorities. Negative results do not preclude SARS-CoV-2  infection and should not be used as the sole basis for treatment or other  patient management decisions. Negative results must be combined with  clinical observations, patient history, and epidemiological information.  This test has not been FDA cleared or approved.    This test has been authorized by FDA under an Emergency Use Authorization  (EUA). This test is only authorized for the duration of time the  declaration that circumstances exist justifying the authorization of the  emergency use of an in vitro diagnostic tests for detection of SARS-CoV-2  virus and/or diagnosis of COVID-19 infection under section 564(b)(1) of  the Act, 21 U.S.C. 360bbb-3(b)(1), unless the authorization is terminated  or revoked sooner. The test has been validated but independent review by FDA  and CLIA is pending.    Test performed using [employer] GeneXpert: This RT-PCR assay targets N2,  a region unique to SARS-CoV-2. A conserved region in the E-gene was chosen  for pan-Sarbecovirus detection which includes SARS-CoV-2.    According to CMS-2020-01-R, this platform meets the definition of high-throughput technology.    CBC and differential [[id number]]  (Abnormal) Collected: 05/02/24 1924    Lab Status: Final result Specimen: Blood from Arm, Right Updated: 05/02/24 2013     WBC 23.33 Thousand/uL      RBC 3.03 Million/uL      Hemoglobin 9.2 g/dL      Hematocrit 29.0 %      MCV 96 fL      MCH 30.4 pg      MCHC 31.7 g/dL      RDW 15.2 %      MPV 10.9 fL      Platelets 152 Thousands/uL     Narrative:      This is an appended report.  These results have been appended to a previously verified report.    Manual Differential(PHLEBS Do Not Order) [[id number]]  (Abnormal) Collected: 05/02/24 1924    Lab Status: Final result Specimen: Blood from Arm, Right Updated: 05/02/24 2013     Segmented % 73 %       Bands % 17 %      Lymphocytes % 5 %      Monocytes % 5 %      Eosinophils % 0 %      Basophils % 0 %      Absolute Neutrophils 21.00 Thousand/uL      Absolute Lymphocytes 1.17 Thousand/uL      Absolute Monocytes 1.17 Thousand/uL      Absolute Eosinophils 0.00 Thousand/uL      Absolute Basophils 0.00 Thousand/uL      Total Counted --     Platelet Estimate Adequate    HS Troponin 0hr (reflex protocol) [[id number]]  (Normal) Collected: 05/02/24 1929    Lab Status: Final result Specimen: Blood from Arm, Left Updated: 05/02/24 2004     hs TnI 0hr 49 ng/L     Comprehensive metabolic panel [[id number]]  (Abnormal) Collected: 05/02/24 1924    Lab Status: Final result Specimen: Blood from Arm, Right Updated: 05/02/24 2004     Sodium 141 mmol/L      Potassium 3.3 mmol/L      Chloride 104 mmol/L      CO2 27 mmol/L      ANION GAP 10 mmol/L      BUN 54 mg/dL      Creatinine 2.99 mg/dL      Glucose 117 mg/dL      Calcium 7.3 mg/dL      Corrected Calcium 8.2 mg/dL      AST 14 U/L      ALT 3 U/L      Alkaline Phosphatase 82 U/L      Total Protein 6.4 g/dL      Albumin 2.9 g/dL      Total Bilirubin 0.53 mg/dL      eGFR 14 ml/min/1.73sq m     Narrative:      National Kidney Disease Foundation guidelines for Chronic Kidney Disease (CKD):     Stage 1 with normal or high GFR (GFR > 90 mL/min/1.73 square meters)    Stage 2 Mild CKD (GFR = 60-89 mL/min/1.73 square meters)    Stage 3A Moderate CKD (GFR = 45-59 mL/min/1.73 square meters)    Stage 3B Moderate CKD (GFR = 30-44 mL/min/1.73 square meters)    Stage 4 Severe CKD (GFR = 15-29 mL/min/1.73 square meters)    Stage 5 End Stage CKD (GFR <15 mL/min/1.73 square meters)  Note: GFR calculation is accurate only with a steady state creatinine    Magnesium [[id number]]  (Abnormal) Collected: 05/02/24 1924    Lab Status: Final result Specimen: Blood from Arm, Right Updated: 05/02/24 2004     Magnesium 1.3 mg/dL     Lipase [[id number]]  (Abnormal) Collected: 05/02/24 1924    Lab Status: Final  result Specimen: Blood from Arm, Right Updated: 05/02/24 2004     Lipase <6 u/L     Urine Microscopic [[id number]]  (Abnormal) Collected: 05/02/24 1944    Lab Status: Final result Specimen: Urine, Straight Cath Updated: 05/02/24 2003     RBC, UA 2-4 /hpf      WBC, UA Innumerable /hpf      Epithelial Cells Occasional /hpf      Bacteria, UA Occasional /hpf      Granular Casts, UA 3-5    Lactic acid, plasma (w/reflex if result > 2.0) [[id number]]  (Normal) Collected: 05/02/24 1924    Lab Status: Final result Specimen: Blood from Arm, Right Updated: 05/02/24 2001     LACTIC ACID 1.2 mmol/L     Narrative:      Result may be elevated if tourniquet was used during collection.    UA w Reflex to Microscopic w Reflex to Culture [[id number]]  (Abnormal) Collected: 05/02/24 1944    Lab Status: Final result Specimen: Urine, Straight Cath Updated: 05/02/24 1958     Color, UA Yellow     Clarity, UA Turbid     Specific Gravity, UA 1.015     pH, UA 5.5     Leukocytes, UA Moderate     Nitrite, UA Negative     Protein,  (2+) mg/dl      Glucose, UA Negative mg/dl      Ketones, UA Negative mg/dl      Urobilinogen, UA <2.0 mg/dl      Bilirubin, UA Negative     Occult Blood, UA Moderate    APTT [[id number]]  (Normal) Collected: 05/02/24 1924    Lab Status: Final result Specimen: Blood from Arm, Right Updated: 05/02/24 1955     PTT 36 seconds     Protime-INR [[id number]]  (Abnormal) Collected: 05/02/24 1924    Lab Status: Final result Specimen: Blood from Arm, Right Updated: 05/02/24 1955     Protime 17.9 seconds      INR 1.40    Fingerstick Glucose (POCT) [[id number]]  (Abnormal) Collected: 05/02/24 1909    Lab Status: Final result Specimen: Blood Updated: 05/02/24 1927     POC Glucose 146 mg/dl             All labs reviewed and utilized in the medical decision making process    Radiology:    CT chest abdomen pelvis wo contrast   Final Result      New asymmetric right perinephric stranding. Correlate for pyelonephritis.      Persistent  multifocal consolidations may represent sequela of prior pneumonia. However, recurrent pneumonia or chronic infectious or inflammatory process is not excluded. Recommend 3-month follow-up noncontrast CT of the chest to assess    stability/resolution.      The study was marked in EPIC for immediate notification.      Workstation performed: [id number]         XR chest portable   Final Result      No acute cardiopulmonary disease.            Workstation performed: [id number]         XR chest portable ICU    (Results Pending)       All radiology studies independently viewed by me and interpreted by the radiologist.    Procedure    CriticalCare Time    Date/Time: 5/4/2024 12:33 PM    Performed by: Rome Shah DO  Authorized by: Rome Shah DO    Critical care provider statement:     Critical care time (minutes):  50    Critical care time was exclusive of:  Separately billable procedures and treating other patients    Critical care was necessary to treat or prevent imminent or life-threatening deterioration of the following conditions:  Sepsis, shock, dehydration, renal failure and respiratory failure    Critical care was time spent personally by me on the following activities:  Obtaining history from patient or surrogate, development of treatment plan with patient or surrogate, discussions with primary provider, evaluation of patient's response to treatment, examination of patient, review of old charts, re-evaluation of patient's condition, ordering and review of radiographic studies and ordering and review of laboratory studies          FINAL IMPRESSION    Final diagnoses:   Septic shock (HCC)   UTI (urinary tract infection)   DARLING (acute kidney injury) (HCC)   Hypomagnesemia         DISPOSITION    Time reflects when diagnosis was documented in both MDM as applicable and the Disposition within this note       Time User Action Codes Description Comment    5/2/2024  9:46 PM Rome Shah Add [A41.9,  R65.21] Septic  shock (HCC)     5/2/2024  9:46 PM Pablo, Bilal Add [N39.0] UTI (urinary tract infection)     5/2/2024  9:46 PM Pablo, Bilal Add [N17.9] DARLING (acute kidney injury) ([facility])     5/2/2024  9:46 PM Pablo, Bilal Add [E83.42] Hypomagnesemia           ED Disposition       ED Disposition   Admit    Condition   Stable    Date/Time   Thu May 2, 2024  9:47 PM    Comment   Case was discussed with Dr. Tena and the patient's admission status was agreed to be Admission Status: inpatient status to the service of Dr. Tena.               Follow-up Information    None           PATIENT REFERRED TO:    No follow-up provider specified.    DISCHARGE MEDICATIONS:    Current Discharge Medication List        CONTINUE these medications which have NOT CHANGED    Details   albuterol (Ventolin HFA) 90 mcg/act inhaler Inhale 2 puffs every 6 (six) hours as needed for wheezing Do not start before April 29, 2024.  Qty: 18 g, Refills: 0    Comments: Substitution to a formulary equivalent within the same pharmaceutical class is authorized.  Associated Diagnoses: COPD exacerbation (HCC)      aspirin (ECOTRIN LOW STRENGTH) 81 mg EC tablet Take 1 tablet (81 mg total) by mouth daily for 15 days Do not start before April 29, 2024.  Qty: 15 tablet, Refills: 0    Associated Diagnoses: COPD (chronic obstructive pulmonary disease) (HCC)      buPROPion (WELLBUTRIN) 75 mg tablet Take 1 tablet (75 mg total) by mouth in the morning for 15 days Do not start before April 29, 2024.  Qty: 15 tablet, Refills: 0    Associated Diagnoses: Major depressive disorder, recurrent episode, moderate (HCC)      Calcium 500 MG tablet Take 1 tablet (500 mg total) by mouth 2 (two) times a day for 15 days Do not start before April 29, 2024.  Qty: 30 tablet, Refills: 0    Associated Diagnoses: Moderate protein-calorie malnutrition (HCC)      cariprazine (Vraylar) 1.5 MG capsule Take 1 capsule (1.5 mg total) by mouth daily for 15 days Do not start before April 29, 2024.  Qty: 15  capsule, Refills: 0    Associated Diagnoses: Major depressive disorder, recurrent episode, moderate (HCC)      Fluticasone-Salmeterol (Advair Diskus) 250-50 mcg/dose inhaler Inhale 1 puff 2 (two) times a day Rinse mouth after use. Do not start before April 29, 2024.  Qty: 60 blister, Refills: 0    Comments: Substitution to a formulary equivalent within the same pharmaceutical class is authorized.  Associated Diagnoses: COPD with acute exacerbation (HCC)      gabapentin (NEURONTIN) 100 mg capsule Take 1 capsule (100 mg total) by mouth daily at bedtime for 15 days Do not start before April 29, 2024.  Qty: 15 capsule, Refills: 0    Associated Diagnoses: Pain      ipratropium (ATROVENT) 0.02 % nebulizer solution Take 2.5 mL (0.5 mg total) by nebulization 3 (three) times a day for 15 days Do not start before April 29, 2024.  Qty: 112.5 mL, Refills: 0    Associated Diagnoses: COPD with acute exacerbation (HCC)      Melatonin 5 MG TABS Take 1 tablet (5 mg total) by mouth daily at bedtime for 15 days Do not start before April 29, 2024.  Qty: 15 tablet, Refills: 0    Associated Diagnoses: Insomnia, unspecified type      metoprolol succinate (TOPROL-XL) 50 mg 24 hr tablet Take 1 tablet (50 mg total) by mouth daily for 15 days Do not start before April 29, 2024.  Qty: 15 tablet, Refills: 0    Associated Diagnoses: Essential hypertension      nitroglycerin (NITROSTAT) 0.4 mg SL tablet Place 1 tablet (0.4 mg total) under the tongue every 5 (five) minutes as needed for chest pain for up to 15 days Do not start before April 29, 2024.  Qty: 15 tablet, Refills: 0    Associated Diagnoses: Coronary artery disease of native artery of native heart with stable angina pectoris (HCC)      pantoprazole (PROTONIX) 40 mg tablet Take 1 tablet (40 mg total) by mouth daily for 15 doses Do not start before April 29, 2024.  Qty: 15 tablet, Refills: 0    Associated Diagnoses: Anemia, unspecified type      pravastatin (PRAVACHOL) 80 mg tablet Take 1  tablet (80 mg total) by mouth every evening for 15 days Do not start before April 29, 2024.  Qty: 15 tablet, Refills: 0    Associated Diagnoses: Elevated troponin      sacubitril-valsartan (Entresto) 24-26 MG TABS Take 1 tablet by mouth 2 (two) times a day for 30 doses Do not start before April 29, 2024.  Qty: 30 tablet, Refills: 0    Associated Diagnoses: Chronic combined systolic and diastolic CHF (congestive heart failure) (HCC)      vilazodone (VIIBRYD) 40 mg tablet Take 1 tablet (40 mg total) by mouth daily with breakfast for 15 days Do not start before April 29, 2024.  Qty: 15 tablet, Refills: 0    Associated Diagnoses: Major depressive disorder, recurrent episode, moderate (HCC)      Zanubrutinib 80 MG CAPS Take 160 mg by mouth 2 (two) times a day             No discharge procedures on file.         Rome Shah DO        This note was partially completed using voice recognition technology, and was scanned for gross errors; however some errors may still exist. Please contact the author with any questions or requests for clarification.      Rome Shah DO  05/04/24 1621

## 2024-05-03 LAB
4HR DELTA HS TROPONIN: -10 NG/L
ALBUMIN SERPL BCP-MCNC: 2.8 G/DL (ref 3.5–5)
ALP SERPL-CCNC: 63 U/L (ref 34–104)
ALT SERPL W P-5'-P-CCNC: 3 U/L (ref 7–52)
ANION GAP SERPL CALCULATED.3IONS-SCNC: 7 MMOL/L (ref 4–13)
ANISOCYTOSIS BLD QL SMEAR: PRESENT
AST SERPL W P-5'-P-CCNC: 13 U/L (ref 13–39)
BASOPHILS # BLD MANUAL: 0 THOUSAND/UL (ref 0–0.1)
BASOPHILS NFR MAR MANUAL: 0 % (ref 0–1)
BILIRUB SERPL-MCNC: 0.39 MG/DL (ref 0.2–1)
BUN SERPL-MCNC: 55 MG/DL (ref 5–25)
CA-I BLD-SCNC: 1.07 MMOL/L (ref 1.12–1.32)
CALCIUM ALBUM COR SERPL-MCNC: 8.8 MG/DL (ref 8.3–10.1)
CALCIUM SERPL-MCNC: 7.8 MG/DL (ref 8.4–10.2)
CARDIAC TROPONIN I PNL SERPL HS: 39 NG/L
CHLORIDE SERPL-SCNC: 109 MMOL/L (ref 96–108)
CHOLEST SERPL-MCNC: 86 MG/DL
CO2 SERPL-SCNC: 24 MMOL/L (ref 21–32)
CREAT SERPL-MCNC: 2.59 MG/DL (ref 0.6–1.3)
EOSINOPHIL # BLD MANUAL: 0 THOUSAND/UL (ref 0–0.4)
EOSINOPHIL NFR BLD MANUAL: 0 % (ref 0–6)
ERYTHROCYTE [DISTWIDTH] IN BLOOD BY AUTOMATED COUNT: 15.4 % (ref 11.6–15.1)
GFR SERPL CREATININE-BSD FRML MDRD: 17 ML/MIN/1.73SQ M
GLUCOSE SERPL-MCNC: 139 MG/DL (ref 65–140)
HCT VFR BLD AUTO: 25.8 % (ref 34.8–46.1)
HDLC SERPL-MCNC: 13 MG/DL
HGB BLD-MCNC: 8 G/DL (ref 11.5–15.4)
INR PPP: 1.31 (ref 0.84–1.19)
L PNEUMO1 AG UR QL IA.RAPID: NEGATIVE
LDLC SERPL CALC-MCNC: 43 MG/DL (ref 0–100)
LYMPHOCYTES # BLD AUTO: 1.1 THOUSAND/UL (ref 0.6–4.47)
LYMPHOCYTES # BLD AUTO: 6 % (ref 14–44)
MAGNESIUM SERPL-MCNC: 2 MG/DL (ref 1.9–2.7)
MCH RBC QN AUTO: 30.4 PG (ref 26.8–34.3)
MCHC RBC AUTO-ENTMCNC: 31 G/DL (ref 31.4–37.4)
MCV RBC AUTO: 98 FL (ref 82–98)
METAMYELOCYTE ABSOLUTE CT: 0.74 THOUSAND/UL (ref 0–0.1)
METAMYELOCYTES NFR BLD MANUAL: 4 % (ref 0–1)
MONOCYTES # BLD AUTO: 0.18 THOUSAND/UL (ref 0–1.22)
MONOCYTES NFR BLD: 1 % (ref 4–12)
MYELOCYTE ABSOLUTE CT: 0.18 THOUSAND/UL (ref 0–0.1)
MYELOCYTES NFR BLD MANUAL: 1 % (ref 0–1)
NEUTROPHILS # BLD MANUAL: 16.19 THOUSAND/UL (ref 1.85–7.62)
NEUTS BAND NFR BLD MANUAL: 8 % (ref 0–8)
NEUTS SEG NFR BLD AUTO: 80 % (ref 43–75)
NONHDLC SERPL-MCNC: 73 MG/DL
PHOSPHATE SERPL-MCNC: 3.7 MG/DL (ref 2.3–4.1)
PLATELET # BLD AUTO: 120 THOUSANDS/UL (ref 149–390)
PLATELET BLD QL SMEAR: ABNORMAL
PMV BLD AUTO: 11.1 FL (ref 8.9–12.7)
POTASSIUM SERPL-SCNC: 4.4 MMOL/L (ref 3.5–5.3)
PROCALCITONIN SERPL-MCNC: 141.27 NG/ML
PROT SERPL-MCNC: 6.2 G/DL (ref 6.4–8.4)
PROTHROMBIN TIME: 17 SECONDS (ref 11.6–14.5)
RBC # BLD AUTO: 2.63 MILLION/UL (ref 3.81–5.12)
RBC MORPH BLD: PRESENT
S PNEUM AG UR QL: NEGATIVE
SODIUM SERPL-SCNC: 140 MMOL/L (ref 135–147)
TRIGL SERPL-MCNC: 152 MG/DL
VANCOMYCIN SERPL-MCNC: 15.2 UG/ML (ref 10–20)
WBC # BLD AUTO: 18.4 THOUSAND/UL (ref 4.31–10.16)

## 2024-05-03 PROCEDURE — 84484 ASSAY OF TROPONIN QUANT: CPT

## 2024-05-03 PROCEDURE — 97163 PT EVAL HIGH COMPLEX 45 MIN: CPT

## 2024-05-03 PROCEDURE — 85007 BL SMEAR W/DIFF WBC COUNT: CPT

## 2024-05-03 PROCEDURE — 99233 SBSQ HOSP IP/OBS HIGH 50: CPT | Performed by: ANESTHESIOLOGY

## 2024-05-03 PROCEDURE — 92610 EVALUATE SWALLOWING FUNCTION: CPT | Performed by: SPEECH-LANGUAGE PATHOLOGIST

## 2024-05-03 PROCEDURE — 83735 ASSAY OF MAGNESIUM: CPT

## 2024-05-03 PROCEDURE — 84100 ASSAY OF PHOSPHORUS: CPT

## 2024-05-03 PROCEDURE — 97167 OT EVAL HIGH COMPLEX 60 MIN: CPT

## 2024-05-03 PROCEDURE — 36620 INSERTION CATHETER ARTERY: CPT

## 2024-05-03 PROCEDURE — 97530 THERAPEUTIC ACTIVITIES: CPT

## 2024-05-03 PROCEDURE — 87449 NOS EACH ORGANISM AG IA: CPT

## 2024-05-03 PROCEDURE — 94640 AIRWAY INHALATION TREATMENT: CPT

## 2024-05-03 PROCEDURE — 80061 LIPID PANEL: CPT

## 2024-05-03 PROCEDURE — 82330 ASSAY OF CALCIUM: CPT

## 2024-05-03 PROCEDURE — 94760 N-INVAS EAR/PLS OXIMETRY 1: CPT

## 2024-05-03 PROCEDURE — 85610 PROTHROMBIN TIME: CPT

## 2024-05-03 PROCEDURE — 87081 CULTURE SCREEN ONLY: CPT

## 2024-05-03 PROCEDURE — 85027 COMPLETE CBC AUTOMATED: CPT

## 2024-05-03 PROCEDURE — 80202 ASSAY OF VANCOMYCIN: CPT

## 2024-05-03 PROCEDURE — 94664 DEMO&/EVAL PT USE INHALER: CPT

## 2024-05-03 PROCEDURE — 84145 PROCALCITONIN (PCT): CPT

## 2024-05-03 PROCEDURE — 80053 COMPREHEN METABOLIC PANEL: CPT

## 2024-05-03 RX ORDER — FLUTICASONE FUROATE AND VILANTEROL 200; 25 UG/1; UG/1
1 POWDER RESPIRATORY (INHALATION) DAILY
Status: DISCONTINUED | OUTPATIENT
Start: 2024-05-03 | End: 2024-05-03

## 2024-05-03 RX ORDER — ALBUMIN, HUMAN INJ 5% 5 %
12.5 SOLUTION INTRAVENOUS ONCE
Status: COMPLETED | OUTPATIENT
Start: 2024-05-03 | End: 2024-05-03

## 2024-05-03 RX ORDER — VANCOMYCIN HYDROCHLORIDE 500 MG/100ML
10 INJECTION, SOLUTION INTRAVENOUS ONCE
Qty: 100 ML | Refills: 0 | Status: COMPLETED | OUTPATIENT
Start: 2024-05-03 | End: 2024-05-03

## 2024-05-03 RX ORDER — CALCIUM GLUCONATE 20 MG/ML
2 INJECTION, SOLUTION INTRAVENOUS ONCE
Status: COMPLETED | OUTPATIENT
Start: 2024-05-03 | End: 2024-05-03

## 2024-05-03 RX ADMIN — LIDOCAINE HYDROCHLORIDE 1 ML: 10 INJECTION, SOLUTION EPIDURAL; INFILTRATION; INTRACAUDAL; PERINEURAL at 00:00

## 2024-05-03 RX ADMIN — METHYLPREDNISOLONE SODIUM SUCCINATE 40 MG: 40 INJECTION, POWDER, FOR SOLUTION INTRAMUSCULAR; INTRAVENOUS at 05:49

## 2024-05-03 RX ADMIN — HEPARIN SODIUM 5000 UNITS: 5000 INJECTION INTRAVENOUS; SUBCUTANEOUS at 14:18

## 2024-05-03 RX ADMIN — POTASSIUM CHLORIDE 20 MEQ: 14.9 INJECTION, SOLUTION INTRAVENOUS at 01:54

## 2024-05-03 RX ADMIN — CHLORHEXIDINE GLUCONATE 0.12% ORAL RINSE 15 ML: 1.2 LIQUID ORAL at 08:31

## 2024-05-03 RX ADMIN — HEPARIN SODIUM 5000 UNITS: 5000 INJECTION INTRAVENOUS; SUBCUTANEOUS at 05:49

## 2024-05-03 RX ADMIN — CEFEPIME 1000 MG: 2 INJECTION, POWDER, FOR SOLUTION INTRAVENOUS at 11:38

## 2024-05-03 RX ADMIN — ALBUMIN (HUMAN) 12.5 G: 12.5 INJECTION, SOLUTION INTRAVENOUS at 08:20

## 2024-05-03 RX ADMIN — HEPARIN SODIUM 5000 UNITS: 5000 INJECTION INTRAVENOUS; SUBCUTANEOUS at 22:37

## 2024-05-03 RX ADMIN — ASPIRIN 81 MG: 81 TABLET, COATED ORAL at 08:31

## 2024-05-03 RX ADMIN — BUPROPION HYDROCHLORIDE 75 MG: 75 TABLET, FILM COATED ORAL at 08:31

## 2024-05-03 RX ADMIN — IPRATROPIUM BROMIDE 0.5 MG: 0.5 SOLUTION RESPIRATORY (INHALATION) at 13:29

## 2024-05-03 RX ADMIN — ALBUMIN (HUMAN) 12.5 G: 12.5 INJECTION, SOLUTION INTRAVENOUS at 04:27

## 2024-05-03 RX ADMIN — CALCIUM GLUCONATE 2 G: 20 INJECTION, SOLUTION INTRAVENOUS at 14:18

## 2024-05-03 RX ADMIN — IPRATROPIUM BROMIDE 0.5 MG: 0.5 SOLUTION RESPIRATORY (INHALATION) at 07:23

## 2024-05-03 RX ADMIN — IPRATROPIUM BROMIDE 0.5 MG: 0.5 SOLUTION RESPIRATORY (INHALATION) at 20:32

## 2024-05-03 RX ADMIN — CEFEPIME 1000 MG: 2 INJECTION, POWDER, FOR SOLUTION INTRAVENOUS at 22:37

## 2024-05-03 RX ADMIN — VANCOMYCIN HYDROCHLORIDE 500 MG: 500 INJECTION, SOLUTION INTRAVENOUS at 11:38

## 2024-05-03 NOTE — ASSESSMENT & PLAN NOTE
Lab Results   Component Value Date    EGFR 14 05/02/2024    EGFR 74 03/07/2024    EGFR 72 03/05/2024    CREATININE 2.99 (H) 05/02/2024    CREATININE 0.77 03/07/2024    CREATININE 0.79 03/05/2024     Baseline appears to be 0.7  Presented with an DARLING in the setting of dehydration  Trend kidney function on BMP

## 2024-05-03 NOTE — ASSESSMENT & PLAN NOTE
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 meds tomorrow

## 2024-05-03 NOTE — PROGRESS NOTES
Lauren Quintero is a 78 y.o. female who is currently ordered Vancomycin IV with management by the Pharmacy Consult service.  Relevant clinical data and objective / subjective history reviewed.  Vancomycin Assessment:  Indication and Goal AUC/Trough: Urinary tract infection (goal -600, trough >10); Pneumonia (goal -600, trough >10), -600, trough >10  Clinical Status: stable  Micro:   Pending  Renal Function:  SCr: 2.59 mg/dL  CrCl: 15.4 mL/min  Renal replacement: Not on dialysis (nothing scheduled)  Days of Therapy: 2  Current Dose: 10mg/kg (750mg) QD prn if level <15  Vancomycin Plan:  New Dosing: 10mg/kg (500mg) dose given today for random level of 15. continue 10mg/kg QD prn if level <15  Pulse dosing  Next Level: 5/4/24 AM labs  Renal Function Monitoring: Daily BMP and UOP  Pharmacy will continue to follow closely for s/sx of nephrotoxicity, infusion reactions and appropriateness of therapy.  BMP and CBC will be ordered per protocol. We will continue to follow the patient’s culture results and clinical progress daily.    Gina Juarez, Pharmacist

## 2024-05-03 NOTE — ASSESSMENT & PLAN NOTE
AEB tachycardia, tachypnea leukocytosis, elevated procalcitonin, bandemia, DARLING  Unclear source of infection at this time but concern for UTI with leukocytes and innumerable WBCs  Patient also has a history of aspiration pneumonia  CT chest abdomen pelvis pending  Chest x-ray concerning for right lower lobe consolidation  Lactic negative  Received 2.5 L fluid bolus in the ED as well as cefepime and vancomycin  Cultures pending  MRSA pending  Strep and Legionella urinary antigens pending  Trend fever curve and leukocytosis  Continue broad-spectrum antibiotics with cefepime and vancomycin  Flu/COVID/RSV negative  Cautious fluid administration due to history of ejection fraction of 35%

## 2024-05-03 NOTE — PLAN OF CARE
Problem: INFECTION - ADULT  Goal: Absence or prevention of progression during hospitalization  Description: INTERVENTIONS:  - Assess and monitor for signs and symptoms of infection  - Monitor lab/diagnostic results  - Monitor all insertion sites, i.e. indwelling lines, tubes, and drains  - Monitor endotracheal if appropriate and nasal secretions for changes in amount and color  - Alverda appropriate cooling/warming therapies per order  - Administer medications as ordered  - Instruct and encourage patient and family to use good hand hygiene technique  - Identify and instruct in appropriate isolation precautions for identified infection/condition  Outcome: Progressing  Goal: Absence of fever/infection during neutropenic period  Description: INTERVENTIONS:  - Monitor WBC    Outcome: Progressing

## 2024-05-03 NOTE — ASSESSMENT & PLAN NOTE
Patient has a history of COPD and smoking  Follows outpatient with pulmonology  Chronically wears 4 L nasal cannula, now requiring 5 to 6 L  Likely combination of COPD and possible heart failure exacerbation  Cautious fluid administration  Continue scheduled nebs

## 2024-05-03 NOTE — PLAN OF CARE
Recommendations: soft/level 3 diet and thin liquids     Recommended Form of Meds: as tolerated     Aspiration precautions and compensatory swallowing strategies: upright posture, slow rate of feeding, small bites/sips, alternating bites and sips, and smaller more frequent meals with added moisteners

## 2024-05-03 NOTE — MALNUTRITION/BMI
This medical record reflects one or more clinical indicators suggestive of Underweight      BMI Findings:  Adult BMI Classifications: Underweight < 18.5        Body mass index is 18.47 kg/m².     See Nutrition note dated 5/3/24 for additional details.  Completed nutrition assessment is viewable in the nutrition documentation.

## 2024-05-03 NOTE — ASSESSMENT & PLAN NOTE
Likely prerenal in the setting of poor oral intake for the past several days  Received fluid resuscitation in the ED  Maintain Guadarrama catheter for strict MAURO  Closely monitor urine output and kidney function

## 2024-05-03 NOTE — OCCUPATIONAL THERAPY NOTE
"          Occupational Therapy Evaluation        Patient Name: Lauren Quintero  Today's Date: 5/3/2024     05/03/24 0906   OT Last Visit   OT Visit Date 05/03/24   Note Type   Note type Evaluation   Pain Assessment   Pain Assessment Tool 0-10   Pain Score 7   Pain Location/Orientation Location: Buttocks   Hospital Pain Intervention(s) Ambulation/increased activity   Restrictions/Precautions   Weight Bearing Precautions Per Order No   Other Precautions Chair Alarm;Bed Alarm;Aspiration;Multiple lines;Telemetry;O2;Fall Risk   Home Living   Type of Home House   Home Layout One level;Stairs to enter with rails  (4 [address])   Bathroom Shower/Tub Tub/shower unit   Bathroom Toilet Standard   Bathroom Equipment Grab bars in shower;Tub transfer bench;Grab bars around toilet   Bathroom Accessibility Accessible   Home Equipment Other (Comment)  (Rollator Home/ O2 at 4 L)   Prior Function   Level of [place]   (SBA ambulation w/ rollator (doesn't leave her bed for short periods when she's alone), S for showers)   Lives With Son   Receives Help From Family   Falls in the last 6 months 1 to 4  (\"bad one in February\")   Lifestyle   Autonomy Patient lives with her Son in a one story house, 4 [address], with family. SBA ambulation w/ rollator (doesn't leave her bed for short periods when she's alone), S for showers, reported using a bedside commode recently, but usually able to walk to/ from bathroom.   Reciprocal Relationships Supportive Family   General   Family/Caregiver Present No   ADL   Where Assessed Edge of bed  (Some ADL levels based on functional performance during OT eval)   Eating Assistance 6  Modified independent   Grooming Assistance 5  Supervision/Setup   UB Bathing Assistance 4  Minimal Assistance   LB Bathing Assistance 2  Maximal Assistance   UB Dressing Assistance 4  Minimal Assistance   LB Dressing Assistance 2  Maximal Assistance   Toileting Assistance  4  Minimal Assistance   Functional Assistance 2  Maximal Assistance "   Bed Mobility   Supine to Sit 4  Minimal assistance   Additional items Assist x 1;Increased time required;Verbal cues;HOB elevated;Bedrails   Transfers   Sit to Stand 3  Moderate assistance   Additional items Assist x 1;Increased time required;Verbal cues;Armrests   Stand to Sit 3  Moderate assistance   Additional items Assist x 1;Increased time required;Verbal cues;Armrests   Functional Mobility   Functional Mobility 4  Minimal assistance   Additional Comments assist of 1   Additional items Rolling walker   Balance   Static Sitting Fair +   Dynamic Sitting Fair   Static Standing Fair -   Dynamic Standing Poor +   Ambulatory Poor +   Activity Tolerance   Activity Tolerance Patient limited by fatigue   Medical Staff Made Aware Pt seen as a co-eval with PT due to the patient's co-morbidities, clinically unstable presentation, and present impairments which are a regression from the patient's baseline.   RUE Assessment   RUE Assessment X  (limited overhead movements)   RUE Strength   RUE Overall Strength Deficits  (3+/5)   LUE Assessment   LUE Assessment X   LUE Strength   LUE Overall Strength Deficits  (3+/5)   Hand Function   Gross Motor Coordination Functional   Fine Motor Coordination Functional   Sensation   Light Touch No apparent deficits  (BUEs)   Psychosocial   Psychosocial (WDL) WDL   Cognition   Overall Cognitive Status WFL   Arousal/Participation Alert;Responsive;Cooperative   Attention Within functional limits   Orientation Level Oriented X4   Memory Within functional limits   Following Commands Follows all commands and directions without difficulty   Assessment   Limitation Decreased UE strength;Decreased ADL status;Decreased endurance;Decreased self-care trans;Decreased high-level ADLs   Prognosis Good   Assessment Patient is a 78 y.o. female seen for OT evaluation s/p admit to [facility] on 5/2/2024 w/Septic shock (HCC). Commorbidities affecting patient's functional performance at time of  assessment include: Septic Shock, Chronic combined systolic and diastolic CHF, CAD, COPD, Acute on Chronic respiratory failure, DARLING, CKD, Anemia, Major depressive disorder, essential HTN, Hypokalemia, ambulatory dysfunction. Orders placed for OT evaluation and treatment.  Performed at least two patient identifiers during session including name and wristband.  Prior to admission, Patient lives with her Son in a one story house, [address], with family. SBA ambulation w/ rollator (doesn't leave her bed for short periods when she's alone), S for showers, reported using a bedside commode recently, but usually able to walk to/ from bathroom.  Personal factors affecting patient at time of initial evaluation include: steps to enter, limited insight into deficits, decreased initiation and engagement, difficulty performing ADLs, and difficulty performing IADLs. Upon evaluation, patient requires minimal  and moderate assist for UB ADLs, maximal assist for LB ADLs.   Occupational performance is affected by the following deficits: decreased functional use of BUEs, dynamic sit/ stand balance deficit with poor standing tolerance time for self care and functional mobility, decreased activity tolerance, decreased safety awareness, delayed righting and equilibrium reactions, absent righting and equilibrium reactions , and postural control and postural alignment deficit, requiring external assistance to complete transitional movements.  Patient to benefit from continued Occupational Therapy treatment while in the hospital to address deficits as defined above and maximize level of functional independence with ADLs and functional mobility. Occupational Performance areas to address include: bathing/ shower, dressing, toilet hygiene, transfer to all surfaces, functional mobility, health maintenance, medication routine/ management, IADLs: safety procedures, IADLs: meal prep/ clean up, and Leisure Participation. From OT standpoint,  recommendation at time of d/c would be Level 2.   Plan   Treatment Interventions ADL retraining;Functional transfer training;UE strengthening/ROM;Endurance training;Patient/family training;Equipment evaluation/education;Compensatory technique education;Continued evaluation;Energy conservation;Activityengagement   Goal Expiration Date 05/17/24   OT Frequency 3-5x/wk   Discharge Recommendation   Rehab Resource Intensity Level, OT II (Moderate Resource Intensity)   AM-PAC Daily Activity Inpatient   Lower Body Dressing 2   Bathing 2   Toileting 2   Upper Body Dressing 3   Grooming 3   Eating 4   Daily Activity Raw Score 16   Daily Activity Standardized Score (Calc for Raw Score >=11) 35.96   AM-PAC Applied Cognition Inpatient   Following a Speech/Presentation 3   Understanding Ordinary Conversation 3   Taking Medications 3   Remembering Where Things Are Placed or Put Away 3   Remembering List of 4-5 Errands 3   Taking Care of Complicated Tasks 3   Applied Cognition Raw Score 18   Applied Cognition Standardized Score 38.07   Barthel Index   Feeding 10   Bathing 0   Grooming Score 0   Dressing Score 5   Bladder Score 5   Bowels Score 0   Toilet Use Score 5   Transfers (Bed/Chair) Score 5   Mobility (Level Surface) Score 0   Stairs Score 0   Barthel Index Score 30       1 - Patient will verbalize and demonstrate use of energy conservation/ deep breathing technique and work simplification skills during functional activity with no verbal cues.    2 - Patient will verbalize and demonstrate good body mechanics and joint protection techniques during  ADLs/ IADLs with no verbal cues.    3 - Patient will increase OOB/ sitting tolerance to 2-4 hours per day for increased participation in self care and leisure tasks with no s/s of exertion.    4 - Patient will increase standing tolerance time to 5  minutes with unilateral UE support to complete sink level ADLs@ mod I level.    5 - Patient will increase sitting tolerance at edge of  bed to 20 minutes to complete UB ADLs @ set up assist level.    6 - Patient will transfer bed to Chair / toilet at Set up assist level with AD as indicated.     7 - Patient will complete UB ADLs with set up assist.     8 - Patient will complete LB ADLs with min assist with the use of adaptive equipment.     9 - Patient will complete toileting hygiene with set up assist/ supervision for thoroughness    10 - Patient/ Family  will demonstrate competency with UE Home Exercise Program.

## 2024-05-03 NOTE — ASSESSMENT & PLAN NOTE
Follows outpatient with pulmonology  Patient has a smoker  Chronically wears 4 L nasal cannula  Holding home inhalers  Continue scheduled neb treatments

## 2024-05-03 NOTE — SEPSIS NOTE
"  Sepsis Note   Lauren Quintero 78 y.o. female MRN: [id number]  Unit/Bed#: ED 07 Encounter: [id number]       Initial Sepsis Screening       Row Name 05/02/24 2233                Is the patient's history suggestive of a new or worsening infection? Yes (Proceed)  -JS        Suspected source of infection suspect infection, source unknown  -JS        Indicate SIRS criteria Hyperthemia > 38.3C (100.9F) OR Hypothermia <36C (96.8F);Tachycardia > 90 bpm;Tachypnea > 20 resp per min;Leukocytosis (WBC > 73313 IJL) OR Leukopenia (WBC <4000 IJL) OR Bandemia (WBC >10% bands)  -JS        Are two or more of the above signs & symptoms of infection both present and new to the patient? Yes (Proceed)  -JS        Assess for evidence of organ dysfunction: Are any of the below criteria present within 6 hours of suspected infection and SIRS criteria that are NOT considered to be chronic conditions? MAP < 65;Creatinine > 2.0;Urine output < 0.5 mL/kg/hour for 2 hours  -JS        Date of presentation of severe sepsis 05/02/24  -JS        Time of presentation of severe sepsis 2130 -JS        Date of presentation of septic shock 05/02/24  -JS        Time of presentation of septic shock 2130 -JS        Fluid Resuscitation: A lesser volume than 30 ml/kg IV fluid will be given  2.5L given in ED 2/2 HFrEF  -JS        The 30 mL/kg fluid bolus was not given to the patient despite having hypotension, a lactate of >= 4 mmol/L, or documentation of septic shock secondary to: Concern for fluid/volume overload;Heart Failure  -JS        Instead of the 30 ml/kg fluid bolus, the following volume of crystalloid fluid will be ordered: --        Sepsis Note: Click \"NEXT\" below (NOT \"close\") to generate sepsis note based on above information. --                  User Key  (r) = Recorded By, (t) = Taken By, (c) = Cosigned By      Initials Name Provider Type    TAMMIE Botello Nurse Practitioner                        Body mass index is 21.92 kg/m².  Wt " Readings from Last 1 Encounters:   05/02/24 65.4 kg (144 lb 2.9 oz)     IBW (Ideal Body Weight): 63.9 kg    Ideal body weight: 63.9 kg (140 lb 14 oz)  Adjusted ideal body weight: 64.5 kg (142 lb 3.1 oz)

## 2024-05-03 NOTE — RESPIRATORY THERAPY NOTE
RT Protocol Note  Lauren Quintero 78 y.o. female MRN: [id number]  Unit/Bed#: ICU 11 Encounter: [id number]    Assessment    Principal Problem:    Septic shock ([facility])  Active Problems:    Ambulatory dysfunction    Hypokalemia    Essential hypertension    Acute on chronic respiratory failure with hypoxia ([facility])    Coronary artery disease of native artery of native heart with stable angina pectoris ([facility])    Major depressive disorder, recurrent episode, moderate ([facility])    COPD (chronic obstructive pulmonary disease) ([facility])    Anemia    CKD (chronic kidney disease)    Chronic combined systolic and diastolic CHF (congestive heart failure) ([facility])    DARLING (acute kidney injury) ([facility])      Home Pulmonary Medications:  Tid xoponex  Home Devices/Therapy: Home O2    Past Medical History:   Diagnosis Date    Acute congestive heart failure ([facility]) 2021    Anxiety     Cardiac disease     Chest pain 2021    Chronic pain disorder     COPD (chronic obstructive pulmonary disease) ([facility])     COVID-19 02/15/2024    Depression     Heart disease     Hyperlipidemia     Hypertension     MI (myocardial infarction) ([facility])     MRSA (methicillin resistant Staphylococcus aureus)     Renal disorder     benign kidney tumor     Social History     Socioeconomic History    Marital status:      Spouse name: None    Number of children: 3    Years of education: 13    Highest education level: High school graduate   Occupational History    Occupation: [employer]     Employer: [employer]     Comment: retired    Tobacco Use    Smoking status: Former     Current packs/day: 0.00     Average packs/day: 1 pack/day for 60.0 years (60.0 ttl pk-yrs)     Types: Cigarettes     Start date:      Quit date: 2016     Years since quittin.3    Smokeless tobacco: Never    Tobacco comments:     She has close to a 60 pack-year smoking history, most recently has cut down to 4-5 cigarettes daily   Vaping Use    Vaping status: Never Used    Substance and Sexual Activity    Alcohol use: Never    Drug use: No    Sexual activity: Not Currently   Other Topics Concern    None   Social History Narrative    None     Social Determinants of Health     Financial Resource Strain: Patient Declined (1/6/2024)    Received from [facility]    Overall Financial Resource Strain (CARDIA)     Difficulty of Paying Living Expenses: Patient declined   Food Insecurity: No Food Insecurity (2/19/2024)    Hunger Vital Sign     Worried About Running Out of Food in the Last Year: Never true     Ran Out of Food in the Last Year: Never true   Transportation Needs: No Transportation Needs (2/19/2024)    PRAPARE - Transportation     Lack of Transportation (Medical): No     Lack of Transportation (Non-Medical): No   Physical Activity: Patient Declined (1/6/2024)    Received from [facility]    Exercise Vital Sign     Days of Exercise per Week: Patient declined     Minutes of Exercise per Session: Patient declined   Stress: Patient Declined (1/6/2024)    Received from [facility]    [employer] - Occupational Stress Questionnaire     Feeling of Stress : Patient declined   Social Connections: Patient Declined (1/6/2024)    Received from [facility]    Social Connection and Isolation Panel [NHANES]     Frequency of Communication with Friends and Family: Patient declined     Frequency of Social Gatherings with Friends and Family: Patient declined     Attends Congregation Services: Patient declined     Active Member of Clubs or Organizations: Patient declined     Attends Club or Organization Meetings: Patient declined     Marital Status: Patient declined   Intimate Partner Violence: Patient Declined (1/6/2024)    Received from [facility]    Humiliation, Afraid, Rape, and Kick questionnaire     Fear of Current or Ex-Partner: Patient declined     Emotionally Abused: Patient declined     Physically Abused: Patient declined     Sexually  "Abused: Patient declined   Housing Stability: Low Risk  (2/19/2024)    Housing Stability Vital Sign     Unable to Pay for Housing in the Last Year: No     Number of Places Lived in the Last Year: 2     Unstable Housing in the Last Year: No       Subjective         Objective    Physical Exam:        Vitals:  Blood pressure 106/58, pulse 89, temperature 97.6 °F (36.4 °C), temperature source Oral, resp. rate (!) 30, height 5' 7\" (1.702 m), weight 53.5 kg (117 lb 15.1 oz), SpO2 95%, not currently breastfeeding.          Imaging and other studies: I have personally reviewed pertinent reports.            Plan    Respiratory Plan: Mild Distress pathway, Home Bronchodilator Patient pathway        Resp Comments: pt with hx of copd  will continue with home regimean of tid xop   "

## 2024-05-03 NOTE — PROCEDURES
Arterial Line Insertion    Date/Time: 5/3/2024 1:39 AM    Performed by: TAMMIE Dubois  Authorized by: TAMMIE Dubois    Patient location:  ICU and bedside  Consent:     Consent obtained:  Verbal    Consent given by:  Patient    Risks discussed:  Bleeding, ischemia, infection, pain and repeat procedure  Universal protocol:     Procedure explained and questions answered to patient or proxy's satisfaction: yes      Relevant documents present and verified: yes      Test results available and properly labeled: yes      Immediately prior to procedure a time out was called: yes      Patient identity confirmed:  Arm band and hospital-assigned identification number  Indications:     Indications: hemodynamic monitoring, multiple ABGs, continuous blood pressure monitoring and frequent labs / infusion    Pre-procedure details:     Skin preparation:  Chlorhexidine  Procedure details:     Location / Tip of Catheter:  Radial    Laterality:  Right    Needle gauge:  20 G    Placement technique:  Percutaneous    Number of attempts:  2    Successful placement: yes      Transducer: waveform confirmed    Post-procedure details:     Post-procedure:  Secured with tape, sterile dressing applied and sutured    Patient tolerance of procedure:  Tolerated well, no immediate complications

## 2024-05-03 NOTE — H&P
[facility]  H&P  Name: Lauren Quintero 78 y.o. female I MRN: [id number]  Unit/Bed#: ICU 11 I Date of Admission: 5/2/2024   Date of Service: 5/3/2024 I Hospital Day: 1      Assessment/Plan   * Septic shock ([facility])  Assessment & Plan  AEB tachycardia, tachypnea leukocytosis, elevated procalcitonin, bandemia, DARLING  Unclear source of infection at this time but concern for UTI with leukocytes and innumerable WBCs  Patient also has a history of aspiration pneumonia  CT chest abdomen pelvis pending  Chest x-ray concerning for right lower lobe consolidation  Lactic negative  Received 2.5 L fluid bolus in the ED as well as cefepime and vancomycin  Cultures pending  MRSA pending  Strep and Legionella urinary antigens pending  Trend fever curve and leukocytosis  Continue broad-spectrum antibiotics with cefepime and vancomycin  Flu/COVID/RSV negative  Cautious fluid administration due to history of ejection fraction of 35%    Chronic combined systolic and diastolic CHF (congestive heart failure) ([facility])  Assessment & Plan  Wt Readings from Last 3 Encounters:   05/02/24 65.4 kg (144 lb 2.9 oz)   04/02/24 54.4 kg (120 lb)   03/29/24 54.3 kg (119 lb 9.6 oz)         Prior echo with EF of 35% and grade 1 diastolic dysfunction  Appears to be up approximately 24 pounds since April  Patient has multivessel disease and declined bypass surgery  Currently wearing a LifeVest  Received fluid bolus in the ED for septic shock  Exam has bilateral rails and increased work of breathing  Repeat echo pending  Cautious fluid administration        Coronary artery disease of native artery of native heart with stable angina pectoris ([facility])  Assessment & Plan  Noted to have multivessel disease during cardiac catheterization in February 2024  Patient declined CABG  Currently wearing LifeVest  Patient has a high risk of decompensation in the setting of septic shock  Continue aspirin if able to take p.o. meds tomorrow      COPD (chronic  obstructive pulmonary disease) ([facility])  Assessment & Plan  Follows outpatient with pulmonology  Patient is a current smoker  Chronically wears 4 L nasal cannula  Holding home inhalers  Continue scheduled neb treatments    Acute on chronic respiratory failure with hypoxia ([facility])  Assessment & Plan  Patient has a history of COPD and smoking  Follows outpatient with pulmonology  Chronically wears 4 L nasal cannula, now requiring 5 to 6 L  Likely combination of COPD and possible heart failure exacerbation  Cautious fluid administration  Continue scheduled nebs    DARLING (acute kidney injury) ([facility])  Assessment & Plan  Likely prerenal in the setting of poor oral intake for the past several days  Received fluid resuscitation in the ED  Maintain Guadarrama catheter for strict MAURO  Closely monitor urine output and kidney function      CKD (chronic kidney disease)  Assessment & Plan  Lab Results   Component Value Date    EGFR 14 05/02/2024    EGFR 74 03/07/2024    EGFR 72 03/05/2024    CREATININE 2.99 (H) 05/02/2024    CREATININE 0.77 03/07/2024    CREATININE 0.79 03/05/2024     Baseline appears to be 0.7  Presented with an DARLING in the setting of dehydration      Anemia  Assessment & Plan  Initial labs with hemoglobin 9.2  Appears to be at baseline  Monitor on CBC      Major depressive disorder, recurrent episode, moderate ([facility])  Assessment & Plan  Continue home Wellbutrin    Essential hypertension  Assessment & Plan  Holding home regimen in the setting of septic shock    Hypokalemia  Assessment & Plan  Likely in the setting of poor oral intake  Replete as needed    Ambulatory dysfunction  Assessment & Plan  History of falls  Uses a walker chronically  Fall precautions  PT OT           History of Present Illness     HPI: Lauren Quintero is a 78 y.o. who presents via EMS from home with lethargy and fevers for several days.  Noted to be hypotensive and received 1 L fluid bolus via EMS and 1.5 L fluid bolus in the emergency room.  Required  vasopressors for hypotension refractory to fluid administration.  Septic shock workup with cultures and antibiotics were completed, for unclear infectious source.  patient has a past medical history of combined systolic and diastolic heart failure and multivessel disease for which she refused CABG. echo February 2024 with an EF of 35% and grade 1 diastolic dysfunction, for which she wears a LifeVest.  Other past medical history notable for COPD, hypertension, CKD, ambulatory dysfunction, depression, anemia, and dementia.  She is transferred to critical care service for vasopressors and further workup of septic shock    History obtained from chart review.  Review of Systems   Constitutional:  Positive for appetite change, fatigue and fever.   Respiratory:  Positive for shortness of breath. Negative for cough and wheezing.    Cardiovascular:  Negative for chest pain and leg swelling.   Gastrointestinal: Negative.    Genitourinary:  Negative for flank pain.   Musculoskeletal:  Positive for gait problem.   Neurological:  Positive for weakness.   Psychiatric/Behavioral:  Positive for confusion.      Disposition: Critical care  Historical Information   Past Medical History:  08/27/2021: Acute congestive heart failure (HCC)  No date: Anxiety  No date: Cardiac disease  08/27/2021: Chest pain  No date: Chronic pain disorder  No date: COPD (chronic obstructive pulmonary disease) ([facility])  02/15/2024: COVID-19  No date: Depression  No date: Heart disease  No date: Hyperlipidemia  No date: Hypertension  No date: MI (myocardial infarction) ([facility])  No date: MRSA (methicillin resistant Staphylococcus aureus)  No date: Renal disorder      Comment:  benign kidney tumor Past Surgical History:  No date: APPENDECTOMY  2/21/2024: CARDIAC CATHETERIZATION; N/A      Comment:  Procedure: Cardiac catheterization;  Surgeon: Luke Malagon MD;  Location: [facility] CARDIAC [facility] LAB;  Service:                Cardiology  2/21/2024:  CARDIAC CATHETERIZATION; N/A      Comment:  Procedure: Cardiac Coronary Angiogram;  Surgeon: Luke Malagon MD;  Location: [facility]ARD[facility];  Service:               Cardiology  2/21/2024: CARDIAC CATHETERIZATION; Left      Comment:  Procedure: Cardiac Left Heart Cath;  Surgeon: Luke Malagon MD;  Location: [facility]ARDIAC [facility];  Service:                Cardiology  02/2018: ELBOW BURSA SURGERY; Left      Comment:  D/T MRSA INFECTION  2/27/2024: IR THORACENTESIS  No date: SPINAL FUSION      Comment:  L7 L9   Current Outpatient Medications   Medication Instructions    albuterol (Ventolin HFA) 90 mcg/act inhaler 2 puffs, Inhalation, Every 6 hours PRN    aspirin (ECOTRIN LOW STRENGTH) 81 mg, Oral, Daily    buPROPion (WELLBUTRIN) 75 mg, Oral, Daily    Calcium 500 mg, Oral, 2 times daily    cariprazine (VRAYLAR) 1.5 mg, Oral, Daily    Fluticasone-Salmeterol (Advair Diskus) 250-50 mcg/dose inhaler 1 puff, Inhalation, 2 times daily, Rinse mouth after use.    gabapentin (NEURONTIN) 100 mg, Oral, Daily at bedtime    ipratropium (ATROVENT) 0.5 mg, Nebulization, 3 times daily (RESP)    Melatonin 5 mg, Oral, Daily at bedtime    metoprolol succinate (TOPROL-XL) 50 mg, Oral, Daily    nitroglycerin (NITROSTAT) 0.4 mg, Sublingual, Every 5 minutes PRN    pantoprazole (PROTONIX) 40 mg, Oral, Daily    pravastatin (PRAVACHOL) 80 mg, Oral, Every evening    sacubitril-valsartan (Entresto) 24-26 MG TABS 1 tablet, Oral, 2 times daily    vilazodone (VIIBRYD) 40 mg, Oral, Daily with breakfast    Zanubrutinib 160 mg, Oral, 2 times daily    Allergies   Allergen Reactions    Sulfa Antibiotics Anaphylaxis    Iron GI Intolerance    Niacin     Statins Other (See Comments)     cramps      Social History     Tobacco Use    Smoking status: Former     Current packs/day: 0.00     Average packs/day: 1 pack/day for 60.0 years (60.0 ttl pk-yrs)     Types: Cigarettes     Start date: 1956     Quit date: 2016     Years  since quittin.3    Smokeless tobacco: Never    Tobacco comments:     She has close to a 60 pack-year smoking history, most recently has cut down to 4-5 cigarettes daily   Vaping Use    Vaping status: Never Used   Substance Use Topics    Alcohol use: Never    Drug use: No    Family History   Problem Relation Age of Onset    Heart disease Mother     Cancer Father           Objective                            Vitals I/O      Most Recent Min/Max in 24hrs   Temp 97.8 °F (36.6 °C) Temp  Min: 97.4 °F (36.3 °C)  Max: 100.9 °F (38.3 °C)   Pulse 88 Pulse  Min: 84  Max: 122   Resp (!) 23 Resp  Min: 15  Max: 33   /66 BP  Min: 77/46  Max: 133/64   O2 Sat 96 % SpO2  Min: 91 %  Max: 98 %      Intake/Output Summary (Last 24 hours) at 5/3/2024 0110  Last data filed at 2024 2231  Gross per 24 hour   Intake 1900.05 ml   Output 50 ml   Net 1850.05 ml       Diet NPO    Invasive Monitoring   Arterial Line  [place] /41  Arterial Line BP  Min: 134/45  Max: 165/53   MAP 70 mmHg  Arterial Line MAP (mmHg)  Min: 70 mmHg  Max: 88 mmHg           Physical Exam   Physical Exam  Vitals and nursing note reviewed.   Skin:     General: Skin is warm and dry.      Capillary Refill: Capillary refill takes less than 2 seconds.   HENT:      Head: Normocephalic.      Mouth/Throat:      Mouth: Mucous membranes are dry.   Cardiovascular:      Rate and Rhythm: Normal rate and regular rhythm.      Pulses: Normal pulses.      Heart sounds: Normal heart sounds.   Abdominal:      Palpations: Abdomen is soft.   Constitutional:       General: She is not in acute distress.     Appearance: She is ill-appearing.   Pulmonary:      Breath sounds: Rales present.   Neurological:      Mental Status: She is somnolent and calm.   Genitourinary/Anorectal:  Guadarrama present.          Diagnostic Studies      EKG: NSR  Imaging:   CT chest abdomen pelvis wo contrast   Final Result      New asymmetric right perinephric stranding. Correlate for pyelonephritis.       Persistent multifocal consolidations may represent sequela of prior pneumonia. However, recurrent pneumonia or chronic infectious or inflammatory process is not excluded. Recommend 3-month follow-up noncontrast CT of the chest to assess    stability/resolution.      The study was marked in EPIC for immediate notification.      Workstation performed: [id number]         XR chest portable    (Results Pending)      I have personally reviewed pertinent reports.       Medications:  Scheduled PRN   aspirin, 81 mg, Daily  buPROPion, 75 mg, Daily  cefepime, 1,000 mg, Q12H  chlorhexidine, 15 mL, Q12H ROSELINE  fluticasone-vilanterol, 1 puff, Daily  heparin (porcine), 5,000 Units, Q8H ROSELINE  ipratropium, 0.5 mg, TID  methylPREDNISolone sodium succinate, 40 mg, Q8H ROSELINE  potassium chloride, 20 mEq, Q2H      albuterol, 2.5 mg, Q4H PRN  vancomycin, 10 mg/kg, Daily PRN       Continuous    norepinephrine, 1-30 mcg/min, Last Rate: Stopped (05/03/24 0013)  vasopressin (PITRESSIN) 20 Units in sodium chloride 0.9 % 100 mL infusion, 0.04 Units/min, Last Rate: Stopped (05/03/24 0046)         Labs:    CBC    Recent Labs     05/02/24 1924   WBC 23.33*   HGB 9.2*   HCT 29.0*      BANDSPCT 17*     BMP    Recent Labs     05/02/24 1924   SODIUM 141   K 3.3*      CO2 27   AGAP 10   BUN 54*   CREATININE 2.99*   CALCIUM 7.3*       Coags    Recent Labs     05/02/24 1924   INR 1.40*   PTT 36        Additional Electrolytes  Recent Labs     05/02/24 1924   MG 1.3*          Blood Gas    No recent results  No recent results LFTs  Recent Labs     05/02/24 1924   ALT 3*   AST 14   ALKPHOS 82   ALB 2.9*   TBILI 0.53       Infectious  Recent Labs     05/02/24 1924   PROCALCITONI 225.38*     Glucose  Recent Labs     05/02/24 1924   GLUC 117             Anticipated Length of Stay is > 2 midnights  TAMMIE Dubois

## 2024-05-03 NOTE — PLAN OF CARE
Problem: PHYSICAL THERAPY ADULT  Goal: Performs mobility at highest level of function for planned discharge setting.  See evaluation for individualized goals.  Description: Treatment/Interventions: Functional transfer training, LE strengthening/ROM, Elevations, Therapeutic exercise, Endurance training, Patient/family training, Equipment eval/education, Bed mobility, Gait training, Compensatory technique education, Spoke to nursing, OT  Equipment Recommended:  (pt already has rollator)       See flowsheet documentation for full assessment, interventions and recommendations.  Note: Prognosis: Good  Problem List: Decreased strength, Decreased endurance, Impaired balance, Decreased mobility, Pain  Assessment: Pt is 78 y.o. female seen for a PT evaluation s/p admit to [facility] on 5/2/2024. Pt presenting w/ septic shock. Please see above for other active problem list / PMH. PT now consulted to assess functional mobility and needs for safe d/c planning. Prior to admission, pt was S for ambulation w/ rollator, lives w/ son in a 1SH w/ 4 ADELINA. Currently pt requires MinAx1 for bed skills; ModAx1 for functional transfers; MinAx1 for ambulation w/ RW. Pt presents functioning below baseline and w/ overall mobility deficits 2* to: decreased LE strength; generalized weakness/deconditioning; decreased endurance; impaired balance; gait deviations; SOB/FISHMAN; fatigue; impaired safety and judgement; bed/chair alarms; multiple lines. These impairments place pt at risk for falls. Pt will continue to benefit from skilled PT interventions to address stated impairments; to maximize functional potential; for ongoing pt/ family training; and DME needs. PT is currently recommending HHPT + continued family support.  Barriers to Discharge: None     Rehab Resource Intensity Level, PT: II (Moderate Resource Intensity)    See flowsheet documentation for full assessment.

## 2024-05-03 NOTE — ASSESSMENT & PLAN NOTE
Follows outpatient with pulmonology  Patient is a current smoker  Chronically wears 4 L nasal cannula  Holding home inhalers  Continue scheduled neb treatments

## 2024-05-03 NOTE — PHYSICAL THERAPY NOTE
Physical Therapy Evaluation    Patient's Name: Lauren Quintero    Admitting Diagnosis  Hypomagnesemia [E83.42]  UTI (urinary tract infection) [N39.0]  Altered mental status [R41.82]  DARLING (acute kidney injury) ([facility]) [N17.9]  Septic shock ([facility]) [A41.9, R65.21]    Problem List  Patient Active Problem List   Diagnosis    Ambulatory dysfunction    Hypokalemia    Essential hypertension    Tobacco abuse    Acute on chronic respiratory failure with hypoxia ([facility])    Coronary artery disease of native artery of native heart with stable angina pectoris ([facility])    Flank pain    Major depressive disorder, recurrent episode, moderate (HCC)    Fatigue    COPD (chronic obstructive pulmonary disease) ([facility])    Anemia    Abnormal computed tomography angiography (CTA)    Severe protein-calorie malnutrition ([facility])    Positive blood culture    Waldenstrom macroglobulinemia (HCC)    Generalized weakness    Staring episodes    BRBPR (bright red blood per rectum)    Moderate protein-calorie malnutrition ([facility])    Diverticulitis    Social problem    Dizziness    CKD (chronic kidney disease)    Elevated troponin    Chronic combined systolic and diastolic CHF (congestive heart failure) ([facility])    Abnormal chest x-ray    Loose stools    Insomnia    Non-ST elevation MI (NSTEMI) ([facility])    Pain    Diarrhea    Septic shock (HCC)    DARLING (acute kidney injury) ([facility])       Past Medical History  Past Medical History:   Diagnosis Date    Acute congestive heart failure ([facility]) 08/27/2021    Anxiety     Cardiac disease     Chest pain 08/27/2021    Chronic pain disorder     COPD (chronic obstructive pulmonary disease) ([facility])     COVID-19 02/15/2024    Depression     Heart disease     Hyperlipidemia     Hypertension     MI (myocardial infarction) ([facility])     MRSA (methicillin resistant Staphylococcus aureus)     Renal disorder     benign kidney tumor       Past Surgical History  Past Surgical History:   Procedure Laterality Date    APPENDECTOMY      CARDIAC  "CATHETERIZATION N/A 2/21/2024    Procedure: Cardiac catheterization;  Surgeon: Luke Malagon MD;  Location: [facility];  Service: Cardiology    CARDIAC CATHETERIZATION N/A 2/21/2024    Procedure: Cardiac Coronary Angiogram;  Surgeon: Luke Malagon MD;  Location: [facility];  Service: Cardiology    CARDIAC CATHETERIZATION Left 2/21/2024    Procedure: Cardiac Left Heart Cath;  Surgeon: Luke Malagon MD;  Location: [facility];  Service: Cardiology    ELBOW BURSA SURGERY Left 02/2018    D/T MRSA INFECTION    IR THORACENTESIS  2/27/2024    SPINAL FUSION      L7 L9        05/03/24 0937   PT Last Visit   PT Visit Date 05/03/24   Note Type   Note type Evaluation   Pain Assessment   Pain Assessment Tool 0-10   Pain Score 7   Pain Location/Orientation Location: Buttocks   Hospital Pain Intervention(s) Ambulation/increased activity   Restrictions/Precautions   Weight Bearing Precautions Per Order No   Other Precautions Chair Alarm;Bed Alarm;Aspiration;Multiple lines;Telemetry;O2;Fall Risk  (O2 3L, mechanical soft/thins, Life Vest)   Home Living   Type of Home House   Home Layout One level;Stairs to enter with rails  (4 ADELINA)   Bathroom Shower/Tub Tub/shower unit   Bathroom Toilet Standard   Bathroom Equipment Grab bars in shower;Tub transfer bench;Grab bars around toilet   Home Equipment   (rollator, home O2 4L)   Prior Function   Level of [place]   (SBA ambulation w/ rollator (doesn't leave her bed for short periods when she's alone), S for showers)   Lives With Son   Receives Help From Family   Falls in the last 6 months 1 to 4  (\"bad one in February\")   General   Family/Caregiver Present No   Cognition   Arousal/Participation Alert   Orientation Level Oriented X4   Comments pleasant + cooperative   Subjective   Subjective Agreeable to mobilize.   RLE Assessment   RLE Assessment   (grossly 4-/5)   LLE Assessment   LLE Assessment   (grossly 4-/5)   Coordination   Movements are " Fluid and Coordinated 1   Bed Mobility   Supine to Sit 4  Minimal assistance   Additional items Assist x 1;Increased time required;Verbal cues;HOB elevated;Bedrails   Sit to Supine Unable to assess   Additional Comments Pt greeted in supine.   Transfers   Sit to Stand 3  Moderate assistance   Additional items Assist x 1;Increased time required;Verbal cues;Armrests   Stand to Sit 3  Moderate assistance   Additional items Assist x 1;Increased time required;Verbal cues;Armrests   Additional Comments RW   Ambulation/Elevation   Gait pattern Excessively slow;Short stride;Decreased foot clearance;Improper Weight shift   Gait Assistance 4  Minimal assist   Additional items Assist x 1;Verbal cues;Tactile cues   Assistive Device Rolling walker   Distance 5' + 5'fwd/5' bwd   Stair Management Assistance Not tested   Balance   Static Sitting Fair +   Dynamic Sitting Fair   Static Standing Fair -   Dynamic Standing Poor +   Ambulatory Poor +  (RW)   Endurance Deficit   Endurance Deficit Yes   Endurance Deficit Description weakness, fatigue   Activity Tolerance   Activity Tolerance Patient limited by fatigue   Medical Staff Made Aware OT [place]   Nurse Made Aware yes - cleared + updated   Assessment   Prognosis Good   Problem List Decreased strength;Decreased endurance;Impaired balance;Decreased mobility;Pain   Assessment Pt is 78 y.o. female seen for a PT evaluation s/p [facility] to [facility]. Pt presenting w/ septic shock. Please see above for other active problem list / PMH.     PT now consulted to assess functional mobility and needs for safe d/c planning. Prior to admission, pt was S for ambulation w/ rollator, lives w/ son in a 1SH w/ 4 ADELINA.     Currently pt requires MinAx1 for bed skills; ModAx1 for functional transfers; MinAx1 for ambulation w/ RW. Pt presents functioning below baseline and w/ overall mobility deficits 2* to: decreased LE strength; generalized weakness/deconditioning; decreased endurance;  impaired balance; gait deviations; SOB/FISHMAN; fatigue; impaired safety and judgement; bed/chair alarms; multiple lines. These impairments place pt at risk for falls.     Pt will continue to benefit from skilled PT interventions to address stated impairments; to maximize functional potential; for ongoing pt/ family training; and DME needs. PT is currently recommending HHPT + continued family support.   Barriers to Discharge None   Goals   Patient Goals to have a BM   STG Expiration Date 05/17/24   Short Term Goal #1 In 14 days pt will complete: 1) Bed mobility skills with Donna to facilitate safe return to previous living environment. 2) Functional transfers with Donna to facilitate safe return to previous living environment. 3) Ambulation with least rollator vs ' w/ SBA without LOB for safe ambulation in home/community environment. 4) Improve balance scores by 1 grade to decrease fall risk. 5) Improve LE strength grades by 1 to increase independence w/ all functional mobility, transfers and gait. 6) PT for ongoing pt and family education; DME needs and D/C planning to promote highest level of function in least restrictive environment. 7) Stair training up/ down 4 steps with most appropriate technique and CGA for safe access to previous living environment and to increase community access.   PT Treatment Day 0   Plan   Treatment/Interventions Functional transfer training;LE strengthening/ROM;Elevations;Therapeutic exercise;Endurance training;Patient/family training;Equipment eval/education;Bed mobility;Gait training;Compensatory technique education;Spoke to nursing;OT   PT Frequency 3-5x/wk   Discharge Recommendation   Rehab Resource Intensity Level, PT II (Moderate Resource Intensity)   Equipment Recommended   (pt already has rollator)   AM-PAC Basic Mobility Inpatient   Turning in Flat Bed Without Bedrails 3   Lying on Back to Sitting on Edge of Flat Bed Without Bedrails 3   Moving Bed to Chair 3   Standing Up  From Chair Using Arms 2   Walk in Room 2   Climb 3-5 Stairs With Railing 2   Basic Mobility Inpatient Raw Score 15   Basic Mobility Standardized Score 36.97   [facility] Highest Level Of Mobility   -HL Goal 4: Move to chair/commode   -HLM Achieved 6: Walk 10 steps or more   Additional Treatment Session   Start Time 0921   End Time 0936   Treatment Assessment Additional sit<>stand t/f training to sit on bed pan as pt requesting to have a BM. Instructed pt in static standing x2 min for hygiene w/ RW. Gait training 5' fwd/5' bwd.   End of Consult   Patient Position at End of Consult Bedside chair;Bed/Chair alarm activated;All needs within reach  (on waffle cushion, all lines in tact)     Yuliya Guy, PT, DPT

## 2024-05-03 NOTE — PROGRESS NOTES
Lauren Quintero is a 78 y.o. female who is currently ordered Vancomycin IV with management by the Pharmacy Consult service.  Relevant clinical data and objective / subjective history reviewed.  Vancomycin Assessment:  Indication and Goal AUC/Trough: Pneumonia (goal -600, trough >10); Urinary tract infection (goal -600, trough >10), -600, trough >10  Clinical Status: stable  Micro:   pending  Renal Function:  SCr: 21.99 mg/dL  CrCl: 15.9 mL/min  Renal replacement: Not on dialysis  Days of Therapy: 1  Current Dose: 1250mg loading dose  Vancomycin Plan:  New Dosing: 10mg/kg (7540mg) QD prn if level <15  Estimated AUC: N/A mcg*hr/mL  Estimated Trough: <15 mcg/mL  Next Level: 05/03/24  Renal Function Monitoring: Daily BMP and UOP  Pharmacy will continue to follow closely for s/sx of nephrotoxicity, infusion reactions and appropriateness of therapy.  BMP and CBC will be ordered per protocol. We will continue to follow the patient’s culture results and clinical progress daily.    Augustus Denise, Pharmacist

## 2024-05-03 NOTE — SEPSIS NOTE
"  Sepsis Note   Lauren Quintero 78 y.o. female MRN: [id number]  Unit/Bed#: ED 07 Encounter: [id number]       Initial Sepsis Screening       Row Name 05/02/24 2233                Is the patient's history suggestive of a new or worsening infection? Yes (Proceed)  -JS        Suspected source of infection suspect infection, source unknown  -JS        Indicate SIRS criteria Hyperthemia > 38.3C (100.9F) OR Hypothermia <36C (96.8F);Tachycardia > 90 bpm;Tachypnea > 20 resp per min;Leukocytosis (WBC > 85499 IJL) OR Leukopenia (WBC <4000 IJL) OR Bandemia (WBC >10% bands)  -JS        Are two or more of the above signs & symptoms of infection both present and new to the patient? Yes (Proceed)  -JS        Assess for evidence of organ dysfunction: Are any of the below criteria present within 6 hours of suspected infection and SIRS criteria that are NOT considered to be chronic conditions? MAP < 65;Creatinine > 2.0;Urine output < 0.5 mL/kg/hour for 2 hours  -JS        Date of presentation of severe sepsis 05/02/24  -JS        Time of presentation of severe sepsis 2130 -JS        Date of presentation of septic shock 05/02/24  -JS        Time of presentation of septic shock 2130 -JS        Fluid Resuscitation: A lesser volume than 30 ml/kg IV fluid will be given  2.5L given in ED 2/2 HFrEF  -JS        The 30 mL/kg fluid bolus was not given to the patient despite having hypotension, a lactate of >= 4 mmol/L, or documentation of septic shock secondary to: Concern for fluid/volume overload;Heart Failure  -JS        Instead of the 30 ml/kg fluid bolus, the following volume of crystalloid fluid will be ordered: --        Sepsis Note: Click \"NEXT\" below (NOT \"close\") to generate sepsis note based on above information. --                  User Key  (r) = Recorded By, (t) = Taken By, (c) = Cosigned By      Initials Name Provider Type    TAMMIE Botello Nurse Practitioner                    Default Flowsheet Data (last 720 hours)  " "     Sepsis Reassess       Row Name 05/02/24 2236                   Repeat Volume Status and Tissue Perfusion Assessment Performed    Date of Reassessment: 05/02/24  -        Time of Reassessment: 2236  -        Sepsis Reassessment Note: Click \"NEXT\" below (NOT \"close\") to generate sepsis reassessment note. YES (proceed by clicking \"NEXT\")  -        Repeat Volume Status and Tissue Perfusion Assessment Performed --                  User Key  (r) = Recorded By, (t) = Taken By, (c) = Cosigned By      Initials Name Provider Type     TAMMIE Dubois Nurse Practitioner                    Body mass index is 21.92 kg/m².  Wt Readings from Last 1 Encounters:   05/02/24 65.4 kg (144 lb 2.9 oz)     IBW (Ideal Body Weight): 63.9 kg    Ideal body weight: 63.9 kg (140 lb 14 oz)  Adjusted ideal body weight: 64.5 kg (142 lb 3.1 oz)    "

## 2024-05-03 NOTE — PROGRESS NOTES
[facility]  Progress Note  Name: Lauren Quintero I  MRN: [id number]  Unit/Bed#: ICU 11 I Date of Admission: 5/2/2024   Date of Service: 5/3/2024 I Hospital Day: 1    Assessment/Plan   * Septic shock ([facility])  Assessment & Plan  AEB tachycardia, tachypnea leukocytosis, elevated procalcitonin, bandemia, DARLING  CT suspicious for pneumonia and pyelonephritis  Patient also has a history of aspiration pneumonia  Chest x-ray concerning for right lower lobe consolidation  Lactic negative  Received 2.5 L fluid bolus in the ED as well as cefepime and vancomycin  Cultures pending  MRSA pending  Strep and Legionella urinary antigens pending  Trend fever curve and leukocytosis  Continue broad-spectrum antibiotics with cefepime and vancomycin  Flu/COVID/RSV negative  Cautious fluid administration due to history of ejection fraction of 35%  Weaned of pressors    Chronic combined systolic and diastolic CHF (congestive heart failure) ([facility])  Assessment & Plan  Wt Readings from Last 3 Encounters:   05/02/24 53.5 kg (117 lb 15.1 oz)   04/02/24 54.4 kg (120 lb)   03/29/24 54.3 kg (119 lb 9.6 oz)         Prior echo with EF of 35% and grade 1 diastolic dysfunction  Appears to be up approximately 24 pounds since April  Patient has multivessel disease and declined bypass surgery  Currently wearing a LifeVest  Received fluid bolus in the ED for septic shock  On exam has bilateral rails   Cautious fluid administration        Coronary artery disease of native artery of native heart with stable angina pectoris ([facility])  Assessment & Plan  Noted to have multivessel disease during cardiac catheterization in February 2024  Patient declined CABG  Currently wearing LifeVest  Patient has a high risk of decompensation in the setting of septic shock  Continue aspirin if able to take p.o. meds tomorrow      COPD (chronic obstructive pulmonary disease) ([facility])  Assessment & Plan  Follows outpatient with pulmonology  Patient has a  smoker  Chronically wears 4 L nasal cannula  Holding home inhalers  Continue scheduled neb treatments    Acute on chronic respiratory failure with hypoxia (HCC)  Assessment & Plan  Patient has a history of COPD and smoking  Follows outpatient with pulmonology  Chronically wears 4 L nasal cannula  CT notable for possible pneumonia  Continue broad-spectrum antibiotics  Cautious fluid administration  Continue scheduled nebs    DARLING (acute kidney injury) ([facility])  Assessment & Plan  Likely prerenal in the setting of poor oral intake for the past several days  Received fluid resuscitation in the ED  Maintain [place] catheter for strict I/O  Closely monitor urine output and kidney function  Monitor creatinine on BMP      CKD (chronic kidney disease)  Assessment & Plan  Lab Results   Component Value Date    EGFR 14 05/02/2024    EGFR 74 03/07/2024    EGFR 72 03/05/2024    CREATININE 2.99 (H) 05/02/2024    CREATININE 0.77 03/07/2024    CREATININE 0.79 03/05/2024     Baseline appears to be 0.7  Presented with an DARLING in the setting of dehydration  Trend kidney function on BMP        Anemia  Assessment & Plan  Initial labs with hemoglobin 9.2  Appears to be at baseline  Monitor on CBC      Major depressive disorder, recurrent episode, moderate (HCC)  Assessment & Plan  Continue home Wellbutrin    Essential hypertension  Assessment & Plan  Holding home regimen in the setting of septic shock    Hypokalemia  Assessment & Plan  Likely in the setting of poor oral intake  Replete as needed    Ambulatory dysfunction  Assessment & Plan  History of falls  Uses a walker chronically  Fall precautions  PT OT             Disposition: Med Surg with Telemetry    ICU Core Measures     A: Assess, Prevent, and Manage Pain Has pain been assessed? Yes  Need for changes to pain regimen? No   B: Both SAT/SAT  N/A   C: Choice of Sedation RASS Goal: N/A patient not on sedation  Need for changes to sedation or analgesia regimen? NA   D: Delirium CAM-ICU:  Negative   E: Early Mobility  Plan for early mobility? Yes   F: Family Engagement Plan for family engagement today? Yes       Antibiotic Review: Awaiting culture results.     Review of Invasive Devices:    [place] Plan: Continue for accurate I/O monitoring for 48 hours    [place] Plan: Discontinue arterial line    Prophylaxis:  VTE VTE covered by:  heparin (porcine), Subcutaneous, 5,000 Units at 05/02/24 2313       Stress Ulcer  not orderedcovered bypantoprazole (PROTONIX) 40 mg tablet [[id number]] (Long-Term Med)         Significant 24hr Events     24hr events: Weaned off pressors overnight, no other acute events     Subjective   Review of Systems   Constitutional:  Positive for fatigue.   Respiratory:  Negative for shortness of breath.    Cardiovascular:  Negative for chest pain.   Neurological:  Positive for weakness.      Objective                            Vitals I/O      Most Recent Min/Max in 24hrs   Temp 98.2 °F (36.8 °C) Temp  Min: 97.4 °F (36.3 °C)  Max: 100.9 °F (38.3 °C)   Pulse 84 Pulse  Min: 84  Max: 122   Resp (!) 24 Resp  Min: 15  Max: 33   BP 97/56 BP  Min: 77/46  Max: 133/64   O2 Sat 96 % SpO2  Min: 91 %  Max: 98 %      Intake/Output Summary (Last 24 hours) at 5/3/2024 0404  Last data filed at 5/3/2024 0120  Gross per 24 hour   Intake 2213.01 ml   Output 50 ml   Net 2163.01 ml       Diet NPO    Invasive Monitoring   Arterial Line  Jodie /41  Arterial Line BP  Min: 134/45  Max: 165/53   MAP 70 mmHg  Arterial Line MAP (mmHg)  Min: 70 mmHg  Max: 88 mmHg           Physical Exam   Physical Exam  Vitals and nursing note reviewed.   Eyes:      Pupils: Pupils are equal, round, and reactive to light.   Skin:     General: Skin is warm and dry.   HENT:      Mouth/Throat:      Mouth: Mucous membranes are dry.   Cardiovascular:      Rate and Rhythm: Normal rate and regular rhythm.      Pulses: Normal pulses.      Heart sounds: Normal heart sounds.   Constitutional:       General: She is not in acute  distress.     Appearance: She is ill-appearing.   Pulmonary:      Breath sounds: Rales present.   Neurological:      Mental Status: She is calm.   Genitourinary/Anorectal:  [place] present.          Diagnostic Studies      EKG: NSR  Imaging:  I have personally reviewed pertinent reports.       Medications:  Scheduled PRN   Albumin 5%, 12.5 g, Once  aspirin, 81 mg, Daily  buPROPion, 75 mg, Daily  cefepime, 1,000 mg, Q12H  chlorhexidine, 15 mL, Q12H ROSELINE  heparin (porcine), 5,000 Units, Q8H ROSELINE  ipratropium, 0.5 mg, TID  methylPREDNISolone sodium succinate, 40 mg, Q8H ROSELINE      albuterol, 2.5 mg, Q4H PRN  vancomycin, 10 mg/kg, Daily PRN       Continuous    norepinephrine, 1-30 mcg/min, Last Rate: Stopped (05/03/24 0013)  vasopressin (PITRESSIN) 20 Units in sodium chloride 0.9 % 100 mL infusion, 0.04 Units/min, Last Rate: Stopped (05/03/24 0046)         Labs:    CBC    Recent Labs     05/02/24 1924   WBC 23.33*   HGB 9.2*   HCT 29.0*      BANDSPCT 17*     BMP    Recent Labs     05/02/24 1924   SODIUM 141   K 3.3*      CO2 27   AGAP 10   BUN 54*   CREATININE 2.99*   CALCIUM 7.3*       Coags    Recent Labs     05/02/24 1924   INR 1.40*   PTT 36        Additional Electrolytes  Recent Labs     05/02/24 1924   MG 1.3*          Blood Gas    No recent results  No recent results LFTs  Recent Labs     05/02/24 1924   ALT 3*   AST 14   ALKPHOS 82   ALB 2.9*   TBILI 0.53       Infectious  Recent Labs     05/02/24 1924   PROCALCITONI 225.38*     Glucose  Recent Labs     05/02/24 1924   GLUC 117               TAMMIE Dubois

## 2024-05-03 NOTE — ASSESSMENT & PLAN NOTE
AEB tachycardia, tachypnea leukocytosis, elevated procalcitonin, bandemia, DARLING  CT suspicious for pneumonia and pyelonephritis  Patient also has a history of aspiration pneumonia  Chest x-ray concerning for right lower lobe consolidation  Lactic negative  Received 2.5 L fluid bolus in the ED as well as cefepime and vancomycin  Cultures pending  MRSA pending  Strep and Legionella urinary antigens pending  Trend fever curve and leukocytosis  Continue broad-spectrum antibiotics with cefepime and vancomycin  Flu/COVID/RSV negative  Cautious fluid administration due to history of ejection fraction of 35%  Weaned of pressors

## 2024-05-03 NOTE — ASSESSMENT & PLAN NOTE
Patient has a history of COPD and smoking  Follows outpatient with pulmonology  Chronically wears 4 L nasal cannula  CT notable for possible pneumonia  Continue broad-spectrum antibiotics  Cautious fluid administration  Continue scheduled nebs

## 2024-05-03 NOTE — ASSESSMENT & PLAN NOTE
Lab Results   Component Value Date    EGFR 14 05/02/2024    EGFR 74 03/07/2024    EGFR 72 03/05/2024    CREATININE 2.99 (H) 05/02/2024    CREATININE 0.77 03/07/2024    CREATININE 0.79 03/05/2024     Baseline appears to be 0.7  Presented with an DARLING in the setting of dehydration

## 2024-05-03 NOTE — RESPIRATORY THERAPY NOTE
RT Protocol Note  Lauren Quintero 78 y.o. female MRN: [id number]  Unit/Bed#: ICU 11 Encounter: [id number]    Assessment    Principal Problem:    Septic shock ([facility])  Active Problems:    Ambulatory dysfunction    Hypokalemia    Essential hypertension    Acute on chronic respiratory failure with hypoxia (HCC)    Coronary artery disease of native artery of native heart with stable angina pectoris ([facility])    Major depressive disorder, recurrent episode, moderate (HCC)    COPD (chronic obstructive pulmonary disease) ([facility])    Anemia    CKD (chronic kidney disease)    Chronic combined systolic and diastolic CHF (congestive heart failure) ([facility])    DARLING (acute kidney injury) ([facility])      Home Pulmonary Medications:  Atrovent tid       Past Medical History:   Diagnosis Date    Acute congestive heart failure ([facility]) 2021    Anxiety     Cardiac disease     Chest pain 2021    Chronic pain disorder     COPD (chronic obstructive pulmonary disease) ([facility])     COVID-19 02/15/2024    Depression     Heart disease     Hyperlipidemia     Hypertension     MI (myocardial infarction) ([facility])     MRSA (methicillin resistant Staphylococcus aureus)     Renal disorder     benign kidney tumor     Social History     Socioeconomic History    Marital status:      Spouse name: None    Number of children: 3    Years of education: 13    Highest education level: High school graduate   Occupational History    Occupation: [employer]     Employer: [employer]     Comment: retired    Tobacco Use    Smoking status: Former     Current packs/day: 0.00     Average packs/day: 1 pack/day for 60.0 years (60.0 ttl pk-yrs)     Types: Cigarettes     Start date:      Quit date: 2016     Years since quittin.3    Smokeless tobacco: Never    Tobacco comments:     She has close to a 60 pack-year smoking history, most recently has cut down to 4-5 cigarettes daily   Vaping Use    Vaping status: Never Used   Substance and Sexual Activity     Alcohol use: Never    Drug use: No    Sexual activity: Not Currently   Other Topics Concern    None   Social History Narrative    None     Social Determinants of Health     Financial Resource Strain: Patient Declined (1/6/2024)    Received from [facility] [employer]    Overall Financial Resource Strain (CARDIA)     Difficulty of Paying Living Expenses: Patient declined   Food Insecurity: No Food Insecurity (2/19/2024)    Hunger Vital Sign     Worried About Running Out of Food in the Last Year: Never true     Ran Out of Food in the Last Year: Never true   Transportation Needs: No Transportation Needs (2/19/2024)    PRAPARE - Transportation     Lack of Transportation (Medical): No     Lack of Transportation (Non-Medical): No   Physical Activity: Patient Declined (1/6/2024)    Received from [facility] [employer]    Exercise Vital Sign     Days of Exercise per Week: Patient declined     Minutes of Exercise per Session: Patient declined   Stress: Patient Declined (1/6/2024)    Received from [facility]    [employer] - Occupational Stress Questionnaire     Feeling of Stress : Patient declined   Social Connections: Patient Declined (1/6/2024)    Received from [facility]    Social Connection and Isolation Panel [NHANES]     Frequency of Communication with Friends and Family: Patient declined     Frequency of Social Gatherings with Friends and Family: Patient declined     Attends Yazidism Services: Patient declined     Active Member of Clubs or Organizations: Patient declined     Attends Club or Organization Meetings: Patient declined     Marital Status: Patient declined   Intimate Partner Violence: Patient Declined (1/6/2024)    Received from [facility]    Humiliation, Afraid, Rape, and Kick questionnaire     Fear of Current or Ex-Partner: Patient declined     Emotionally Abused: Patient declined     Physically Abused: Patient declined     Sexually Abused: Patient declined   Housing  "Stability: Low Risk  (2/19/2024)    Housing Stability Vital Sign     Unable to Pay for Housing in the Last Year: No     Number of Places Lived in the Last Year: 2     Unstable Housing in the Last Year: No       Subjective         Objective    Physical Exam:   Assessment Type: Assess only  General Appearance: Sedated  Respiratory Pattern: Dyspnea with exertion  Chest Assessment: Chest expansion symmetrical  Bilateral Breath Sounds: Diminished, Coarse    Vitals:  Blood pressure 121/67, pulse 93, temperature (!) 97.4 °F (36.3 °C), temperature source Oral, resp. rate 22, height 5' 8\" (1.727 m), weight 53.5 kg (117 lb 15.1 oz), SpO2 94%, not currently breastfeeding.          Imaging and other studies: I have personally reviewed pertinent reports.            Plan    Respiratory Plan: Mild Distress pathway        Patient admitted for sepsis.  PMH of copd.  Tid atrovent ordered in accordance with home regimen.  BBS are decreased, course.         "

## 2024-05-03 NOTE — CASE MANAGEMENT
Case Management Assessment & Discharge Planning Note    Patient name Lauren Quintero  Location ICU 11/ICU 11 MRN [id number]  : 1945 Date 5/3/2024       Current Admission Date: 2024  Current Admission Diagnosis:Septic shock ([facility])   Patient Active Problem List    Diagnosis Date Noted    Septic shock (HCC) 2024    DARLING (acute kidney injury) ([facility]) 2024    Diarrhea 2024    Pain 04/15/2024    Loose stools 2024    Insomnia 2024    Non-ST elevation MI (NSTEMI) ([facility]) 2024    Abnormal chest x-ray 2024    Chronic combined systolic and diastolic CHF (congestive heart failure) ([facility]) 2024    Elevated troponin 2024    CKD (chronic kidney disease) 2024    Dizziness 2023    Social problem 2023    Diverticulitis 10/29/2023    Moderate protein-calorie malnutrition ([facility]) 2023    BRBPR (bright red blood per rectum) 2023    Generalized weakness 2023    Staring episodes 2023    Waldenstrom macroglobulinemia ([facility]) 2023    Severe protein-calorie malnutrition ([facility]) 2021    Positive blood culture 2021    COPD (chronic obstructive pulmonary disease) ([facility]) 2021    Anemia 2021    Abnormal computed tomography angiography (CTA) 2021    Fatigue 2021    Major depressive disorder, recurrent episode, moderate ([facility]) 2019    Flank pain 2019    Coronary artery disease of native artery of native heart with stable angina pectoris ([facility]) 2018    Acute on chronic respiratory failure with hypoxia ([facility]) 2018    Ambulatory dysfunction 2018    Hypokalemia 2018    Essential hypertension 2018    Tobacco abuse 2018      LOS (days): 1  Geometric Mean LOS (GMLOS) (days): 2.6  Days to GMLOS:1.7     OBJECTIVE:    Risk of Unplanned Readmission Score: 57.59         Current admission status: Inpatient       Preferred Pharmacy:   [facility]/pharmacy #[id number] - [place] - [address]  [address]  [place] [address]  Phone: [phone] Fax: [phone]    [facility] - [place] - [address]  [address]  [address]  [place] [address]  Phone: [phone] Fax: [phone]    [place], [place] - [address] [facility]  [address] [facility] [address]  Phone: [phone] Fax: [phone]    [facility] [place] - [place], [place] - [address]  [address]  [place] [address]  Phone: [phone] Fax: [phone]    Primary Care Provider: Starla Bateman MD    Primary Insurance: MEDICARE  Secondary Insurance:     ASSESSMENT:  Active Health Care Proxies       Imer Quintero Health Care Agent - Son   Primary Phone: [phone] (Home)                 Advance Directives  Does patient have a Health Care POA?: No  Was patient offered paperwork?: Yes (provided)  Does patient have Advance Directives?: No  Was patient offered paperwork?: Yes (provided)  Primary Contact: Imer Quintero (Son)  [phone] (Home Phone)         Readmission Root Cause  30 Day Readmission: No    Patient Information  Admitted from:: Home  Mental Status: Alert  During Assessment patient was accompanied by: Not accompanied during assessment  Assessment information provided by:: Patient  Primary Caregiver: Self  Support Systems: Son, Family members  Home entry access options. Select all that apply.: Stairs  Number of steps to enter home.: 4  Do the steps have railings?: Yes  Type of Current Residence: [facility]  Living Arrangements: Lives w/ Son  Is patient a ?: No    Activities of Daily Living Prior to Admission  Functional Status: Independent  Completes ADLs independently?: Yes  Ambulates independently?: Yes  Does patient use assisted devices?: Yes  Assisted Devices (DME) used: Walker, Bedside Commode, Shower Chair, Other (Comment), Portable Oxygen tanks, Home Oxygen concentrator (adjustable bed, grab bars in shower)  DME  Company Name (respiratory supplies): does not remember  O2 Rate(s): 4L NC  Does patient currently own DME?: Yes  What DME does the patient currently own?: Bedside Commode, Home Oxygen concentrator, Portable Oxygen tanks, Other (Comment), Walker, Shower Chair, Straight Cane (adjustable bed, grab bars in shower)  Does patient have a history of Outpatient Therapy (PT/OT)?: No  Does the patient have a history of Short-Term Rehab?: Yes  Does patient have a history of HHC?: Yes  Does patient currently have HHC?: Yes ([facility] but she was hospitalized before visits began;  d/c from [facility] on Mon, 4/29)    Current Home Health Care  Type of Current Home Care Services: Home OT, Home PT, Nurse visit  Current Home Health Agency::  ([facility])  Current Home Health Follow-Up Provider:: PCP    Patient Information Continued  Income Source: SSI/SSD  Does patient have prescription coverage?: Yes  Does patient receive dialysis treatments?: No  Does patient have a history of substance abuse?: No  Does patient have a history of Mental Health Diagnosis?: Yes (pt reports she has depression and anxiety)  Is patient receiving treatment for mental health?: Yes  Has patient received inpatient treatment related to mental health in the last 2 years?: No         Means of Transportation  Means of Transport to Appts:: Family transport      Social Determinants of Health (SDOH)      Flowsheet Row Most Recent Value   Housing Stability    In the last 12 months, was there a time when you were not able to pay the mortgage or rent on time? N   In the last 12 months, how many places have you lived? 1   In the last 12 months, was there a time when you did not have a steady place to sleep or slept in a shelter (including now)? N   Transportation Needs    In the past 12 months, has lack of transportation kept you from medical appointments or from getting medications? no   In the past 12 months, has lack of transportation kept  "you from meetings, work, or from getting things needed for daily living? No   Food Insecurity    Within the past 12 months, you worried that your food would run out before you got the money to buy more. Never true   Within the past 12 months, the food you bought just didn't last and you didn't have money to get more. Never true   Utilities    In the past 12 months has the electric, gas, oil, or water company threatened to shut off services in your home? No            DISCHARGE DETAILS:    Discharge planning discussed with:: pt  Freedom of Choice: Yes  Comments - Freedom of Choice: Met w pt, introduced self and explained role of CM, including arranging DME, STR, home health, out pt tx.  Advised pt that CM will make appropriate referrals based on treatment team recommendations and MD orders, and she can consider recommended plan of care and choose from available vendors.  CM contacted family/caregiver?: Yes  Were Treatment Team discharge recommendations reviewed with patient/caregiver?:  (no recommendations at this time)          Contacts  Patient Contacts: Imer Quintero (Son)  [phone] (Home Phone)  Relationship to Patient:: Family  Contact Method: Phone  Phone Number: Imer Quintero (Son)  [phone] (Home Phone)  Reason/Outcome: Continuity of Care, Discharge Planning         DME Referral Provided  Referral made for DME?: No    Other Referral/Resources/Interventions Provided:  Referral Comments: Pt's address listed as [facility].  Pt reports she just left facility a few days ago before coming back to hospital.  Referral sent to SNF asking if pt had been there and is she eligible to return.  S/W son Imer w whom pt is living.  Son had a TKR this afternoon.  Reports pt \"looked great when she left on Mon, but she came home and did nothing\".  Son says he cannot manage pt at home, and he wants her to return to facility while he plans for long term care.  Son reports pt was there 8 weeks for STR.  Awaiting " Hansel Worthington's response to inquiries, now to include how many MC days pt may have remaining.         Treatment Team Recommendation: Other (TBD)  Discharge Destination Plan:: Short Term Rehab, SNF  Transport at Discharge : Other (Comment) (TBD)                             IMM Given (Date):: 05/03/24  IMM Given to:: Patient

## 2024-05-03 NOTE — SPEECH THERAPY NOTE
Speech-Language Pathology Bedside Swallow Evaluation        Patient Name: Lauren Quintero    Today's Date: 5/3/2024     Problem List  Patient Active Problem List   Diagnosis    Ambulatory dysfunction    Hypokalemia    Essential hypertension    Tobacco abuse    Acute on chronic respiratory failure with hypoxia (HCC)    Coronary artery disease of native artery of native heart with stable angina pectoris (HCC)    Flank pain    Major depressive disorder, recurrent episode, moderate (HCC)    Fatigue    COPD (chronic obstructive pulmonary disease) ([facility])    Anemia    Abnormal computed tomography angiography (CTA)    Severe protein-calorie malnutrition (HCC)    Positive blood culture    Waldenstrom macroglobulinemia (HCC)    Generalized weakness    Staring episodes    BRBPR (bright red blood per rectum)    Moderate protein-calorie malnutrition (HCC)    Diverticulitis    Social problem    Dizziness    CKD (chronic kidney disease)    Elevated troponin    Chronic combined systolic and diastolic CHF (congestive heart failure) ([facility])    Abnormal chest x-ray    Loose stools    Insomnia    Non-ST elevation MI (NSTEMI) ([facility])    Pain    Diarrhea    Septic shock ([facility])    DARLING (acute kidney injury) ([facility])       Past Medical History  Past Medical History:   Diagnosis Date    Acute congestive heart failure ([facility]) 08/27/2021    Anxiety     Cardiac disease     Chest pain 08/27/2021    Chronic pain disorder     COPD (chronic obstructive pulmonary disease) ([facility])     COVID-19 02/15/2024    Depression     Heart disease     Hyperlipidemia     Hypertension     MI (myocardial infarction) ([facility])     MRSA (methicillin resistant Staphylococcus aureus)     Renal disorder     benign kidney tumor       Past Surgical History  Past Surgical History:   Procedure Laterality Date    APPENDECTOMY      CARDIAC CATHETERIZATION N/A 2/21/2024    Procedure: Cardiac catheterization;  Surgeon: Luke Malagon MD;  Location: [facility] CARDIAC CATH LAB;  Service:  Cardiology    CARDIAC CATHETERIZATION N/A 2/21/2024    Procedure: Cardiac Coronary Angiogram;  Surgeon: Luke Malagon MD;  Location: [facility];  Service: Cardiology    CARDIAC CATHETERIZATION Left 2/21/2024    Procedure: Cardiac Left Heart Cath;  Surgeon: Luke Malagon MD;  Location: [facility]ARD[facility];  Service: Cardiology    ELBOW BURSA SURGERY Left 02/2018    D/T MRSA INFECTION    IR THORACENTESIS  2/27/2024    SPINAL FUSION      L7 L9         Current Medical Status  Pt is a 78 y.o. female who presented to [facility] with fever, generalized weakness, fatigue.  Patient has a history of dementia, she is alert and oriented to self and to place however not to time currently.  Recently discharged from rehab facility on Monday, has been living with son however has been declining.  Her blood pressure was low for EMS, she received 1 L of fluids, she had a fever.  She denies any pain or cough or runny nose.  She is on baseline 2-3 liters of oxygen.  No nausea or vomiting.  Uncertain if any sick contacts.     Recent EGD did reveal erosive gastritis and duodenal bulb angiectasia.She is known to this department from prior admissions with baseline diet of dysphagia 3 and thin liquids. Patient reports she continues with this at home and denies any sxs of aspiration. Prior VBS suggestive of esophageal dysphagia. Patient does admit to hx of chronic GERD.    Past medical history:   Please see H&P for details    Special Studies:  2/29 VBS: Pt presents with mild oropharyngeal dysphagia characterized by delayed swallow, weak BOT retraction and reduced hyolaryngeal elevation.  Noted pharyngeal retention (<1/2 bolus) of solids>liquids, which patient makes no attempt to clear.  Requires verbal cues for multiple swallows.  Cannot r/o diverticulum, noted retention is cleared w/ sips of liquid and multiple swallows by completion of study.  While view limited by patient postioning in fluoro, esophogeal sweep  completed in lateral position and significant for retention in the upper esophagus as well.  No carlos aspiration observed under fluoro.  Cannot r/o post swallow aspiration given the above.  Patient c/o discomfort and requests to d/c study.    Note: Images are available for review in PACS as desired.    Social/Education/Vocational Hx:  Pt lives with family (son)    Swallow Information   Current Risks for Dysphagia & Aspiration: known history of dysphagia and change in respiratory status     Current Symptoms/Concerns: change in respiratory status    Current Diet: mechanically altered/level 2 diet and thin liquids      Baseline Diet: soft/level 3 diet and thin liquids      Baseline Assessment   Behavior/Cognition: alert    Speech/Language Status: able to participate in conversation and able to follow commands    Patient Positioning: upright in recliner      Swallow Mechanism Exam   Facial: symmetrical  Labial: WFL  Lingual: WFL  Velum: symmetrical  Mandible: adequate ROM  Dentition: edentulous (has dentures at home but does not wear them)  Vocal quality:clear/adequate   Volitional Cough: strong/productive   Resp: 3L NC    Consistencies Assessed and Performance   Consistencies Administered: thin liquids, puree, and soft solids  Specific materials administered included: pudding, paulette crackers, thin liquids    Oral Stage:   Mastication was min prolonged ( as expected) but adequate with the materials administered today.  Bolus formation and transfer were functional with no significant oral residue noted.  No overt s/s reduced oral control.    Pharyngeal Stage:   Swallowing initiation appeared prompt.  Laryngeal rise was palpated and judged to be within functional limits.  No coughing, throat clearing, change in vocal quality or respiratory status noted today.     Esophageal Concerns:  known h/o dysmotility/retention    Summary   Pts oropharyngeal swallow function appears c/w baseline. Likely risk for retrograde  aspiration. Was placed on PPI per chart in Nov for 8 weeks and since discontinued. Can consider restarting this.   Education provided to the patient on aspiration risk/sxs as well as reflux precautions. Reciprocal comprehension was verbally expressed.     Recommendations: soft/level 3 diet and thin liquids     Recommended Form of Meds:  as tolerated      Aspiration precautions and compensatory swallowing strategies: upright posture, slow rate of feeding, small bites/sips, alternating bites and sips, and smaller more frequent meals with added moisteners    Results Reviewed with: patient, RN, and CHANANP     Dysphagia Goals: pt will tolerate dysph 3 textures with thin liquids without s/s of aspiration x2-3    Plan  Will f/u 1-2x    Liv Oconnell M.S., CCC-SLP  Speech Language Pathologist   Available via [employer]  [place] #[id number]  [place] #[id number]

## 2024-05-03 NOTE — ASSESSMENT & PLAN NOTE
Wt Readings from Last 3 Encounters:   05/02/24 65.4 kg (144 lb 2.9 oz)   04/02/24 54.4 kg (120 lb)   03/29/24 54.3 kg (119 lb 9.6 oz)         Prior echo with EF of 35% and grade 1 diastolic dysfunction  Appears to be up approximately 24 pounds since April  Patient has multivessel disease and declined bypass surgery  Currently wearing a LifeVest  Received fluid bolus in the ED for septic shock  Exam has bilateral rails and increased work of breathing  Repeat echo pending  Cautious fluid administration

## 2024-05-03 NOTE — ASSESSMENT & PLAN NOTE
Wt Readings from Last 3 Encounters:   05/02/24 53.5 kg (117 lb 15.1 oz)   04/02/24 54.4 kg (120 lb)   03/29/24 54.3 kg (119 lb 9.6 oz)         Prior echo with EF of 35% and grade 1 diastolic dysfunction  Appears to be up approximately 24 pounds since April  Patient has multivessel disease and declined bypass surgery  Currently wearing a LifeVest  Received fluid bolus in the ED for septic shock  On exam has bilateral rails   Cautious fluid administration

## 2024-05-03 NOTE — ASSESSMENT & PLAN NOTE
Likely prerenal in the setting of poor oral intake for the past several days  Received fluid resuscitation in the ED  Maintain Guadarrama catheter for strict I/O  Closely monitor urine output and kidney function  Monitor creatinine on BMP

## 2024-05-04 ENCOUNTER — APPOINTMENT (INPATIENT)
Dept: RADIOLOGY | Facility: HOSPITAL | Age: 79
DRG: 871 | End: 2024-05-04
Payer: MEDICARE

## 2024-05-04 PROBLEM — N18.9 ACUTE KIDNEY INJURY SUPERIMPOSED ON CHRONIC KIDNEY DISEASE  (HCC): Status: ACTIVE | Noted: 2024-05-04

## 2024-05-04 PROBLEM — E87.6 HYPOKALEMIA: Status: RESOLVED | Noted: 2018-12-16 | Resolved: 2024-05-04

## 2024-05-04 PROBLEM — N17.9 ACUTE KIDNEY INJURY SUPERIMPOSED ON CHRONIC KIDNEY DISEASE  (HCC): Status: ACTIVE | Noted: 2024-05-04

## 2024-05-04 LAB
2HR DELTA HS TROPONIN: 2 NG/L
ANION GAP SERPL CALCULATED.3IONS-SCNC: 6 MMOL/L (ref 4–13)
ATRIAL RATE: 122 BPM
ATRIAL RATE: 90 BPM
ATRIAL RATE: 96 BPM
BNP SERPL-MCNC: 476 PG/ML (ref 0–100)
BUN SERPL-MCNC: 60 MG/DL (ref 5–25)
CA-I BLD-SCNC: 1.17 MMOL/L (ref 1.12–1.32)
CALCIUM SERPL-MCNC: 8.8 MG/DL (ref 8.4–10.2)
CARDIAC TROPONIN I PNL SERPL HS: 12 NG/L
CARDIAC TROPONIN I PNL SERPL HS: 14 NG/L
CHLORIDE SERPL-SCNC: 108 MMOL/L (ref 96–108)
CO2 SERPL-SCNC: 24 MMOL/L (ref 21–32)
CREAT SERPL-MCNC: 2.11 MG/DL (ref 0.6–1.3)
ERYTHROCYTE [DISTWIDTH] IN BLOOD BY AUTOMATED COUNT: 15.4 % (ref 11.6–15.1)
GFR SERPL CREATININE-BSD FRML MDRD: 22 ML/MIN/1.73SQ M
GLUCOSE SERPL-MCNC: 156 MG/DL (ref 65–140)
HCT VFR BLD AUTO: 26.1 % (ref 34.8–46.1)
HGB BLD-MCNC: 8.1 G/DL (ref 11.5–15.4)
MAGNESIUM SERPL-MCNC: 2 MG/DL (ref 1.9–2.7)
MCH RBC QN AUTO: 30.1 PG (ref 26.8–34.3)
MCHC RBC AUTO-ENTMCNC: 31 G/DL (ref 31.4–37.4)
MCV RBC AUTO: 97 FL (ref 82–98)
MRSA NOSE QL CULT: NORMAL
NRBC BLD AUTO-RTO: 0 /100 WBCS
P AXIS: 64 DEGREES
P AXIS: 66 DEGREES
P AXIS: 73 DEGREES
PHOSPHATE SERPL-MCNC: 2.6 MG/DL (ref 2.3–4.1)
PLATELET # BLD AUTO: 130 THOUSANDS/UL (ref 149–390)
PMV BLD AUTO: 11.2 FL (ref 8.9–12.7)
POTASSIUM SERPL-SCNC: 4 MMOL/L (ref 3.5–5.3)
PR INTERVAL: 130 MS
PR INTERVAL: 138 MS
PR INTERVAL: 150 MS
PROCALCITONIN SERPL-MCNC: 79.62 NG/ML
QRS AXIS: 67 DEGREES
QRS AXIS: 82 DEGREES
QRS AXIS: 85 DEGREES
QRSD INTERVAL: 82 MS
QRSD INTERVAL: 86 MS
QRSD INTERVAL: 90 MS
QT INTERVAL: 350 MS
QT INTERVAL: 370 MS
QT INTERVAL: 390 MS
QTC INTERVAL: 452 MS
QTC INTERVAL: 492 MS
QTC INTERVAL: 498 MS
RBC # BLD AUTO: 2.69 MILLION/UL (ref 3.81–5.12)
SODIUM SERPL-SCNC: 138 MMOL/L (ref 135–147)
T WAVE AXIS: 102 DEGREES
T WAVE AXIS: 55 DEGREES
T WAVE AXIS: 97 DEGREES
VANCOMYCIN SERPL-MCNC: 15.1 UG/ML (ref 10–20)
VENTRICULAR RATE: 122 BPM
VENTRICULAR RATE: 90 BPM
VENTRICULAR RATE: 96 BPM
WBC # BLD AUTO: 18.23 THOUSAND/UL (ref 4.31–10.16)

## 2024-05-04 PROCEDURE — 83735 ASSAY OF MAGNESIUM: CPT | Performed by: NURSE PRACTITIONER

## 2024-05-04 PROCEDURE — 84145 PROCALCITONIN (PCT): CPT | Performed by: NURSE PRACTITIONER

## 2024-05-04 PROCEDURE — 93010 ELECTROCARDIOGRAM REPORT: CPT | Performed by: INTERNAL MEDICINE

## 2024-05-04 PROCEDURE — 80048 BASIC METABOLIC PNL TOTAL CA: CPT | Performed by: NURSE PRACTITIONER

## 2024-05-04 PROCEDURE — 84484 ASSAY OF TROPONIN QUANT: CPT | Performed by: STUDENT IN AN ORGANIZED HEALTH CARE EDUCATION/TRAINING PROGRAM

## 2024-05-04 PROCEDURE — 84100 ASSAY OF PHOSPHORUS: CPT | Performed by: NURSE PRACTITIONER

## 2024-05-04 PROCEDURE — 93005 ELECTROCARDIOGRAM TRACING: CPT

## 2024-05-04 PROCEDURE — 80202 ASSAY OF VANCOMYCIN: CPT | Performed by: NURSE PRACTITIONER

## 2024-05-04 PROCEDURE — 83880 ASSAY OF NATRIURETIC PEPTIDE: CPT | Performed by: STUDENT IN AN ORGANIZED HEALTH CARE EDUCATION/TRAINING PROGRAM

## 2024-05-04 PROCEDURE — 94760 N-INVAS EAR/PLS OXIMETRY 1: CPT

## 2024-05-04 PROCEDURE — 94664 DEMO&/EVAL PT USE INHALER: CPT

## 2024-05-04 PROCEDURE — 99233 SBSQ HOSP IP/OBS HIGH 50: CPT | Performed by: STUDENT IN AN ORGANIZED HEALTH CARE EDUCATION/TRAINING PROGRAM

## 2024-05-04 PROCEDURE — 85027 COMPLETE CBC AUTOMATED: CPT | Performed by: NURSE PRACTITIONER

## 2024-05-04 PROCEDURE — 71045 X-RAY EXAM CHEST 1 VIEW: CPT

## 2024-05-04 PROCEDURE — 94640 AIRWAY INHALATION TREATMENT: CPT

## 2024-05-04 PROCEDURE — 82330 ASSAY OF CALCIUM: CPT | Performed by: NURSE PRACTITIONER

## 2024-05-04 RX ORDER — LANOLIN ALCOHOL/MO/W.PET/CERES
4.5 CREAM (GRAM) TOPICAL
Status: DISPENSED | OUTPATIENT
Start: 2024-05-04 | End: 2024-05-14

## 2024-05-04 RX ORDER — PANTOPRAZOLE SODIUM 40 MG/1
40 TABLET, DELAYED RELEASE ORAL DAILY
Status: DISCONTINUED | OUTPATIENT
Start: 2024-05-04 | End: 2024-05-15 | Stop reason: HOSPADM

## 2024-05-04 RX ORDER — METOPROLOL SUCCINATE 50 MG/1
50 TABLET, EXTENDED RELEASE ORAL DAILY
Status: DISCONTINUED | OUTPATIENT
Start: 2024-05-04 | End: 2024-05-05

## 2024-05-04 RX ORDER — VANCOMYCIN HYDROCHLORIDE 750 MG/150ML
12.5 INJECTION, SOLUTION INTRAVENOUS ONCE
Status: DISCONTINUED | OUTPATIENT
Start: 2024-05-04 | End: 2024-05-04

## 2024-05-04 RX ORDER — GABAPENTIN 100 MG/1
100 CAPSULE ORAL
Status: DISCONTINUED | OUTPATIENT
Start: 2024-05-04 | End: 2024-05-15 | Stop reason: HOSPADM

## 2024-05-04 RX ADMIN — HEPARIN SODIUM 5000 UNITS: 5000 INJECTION INTRAVENOUS; SUBCUTANEOUS at 14:21

## 2024-05-04 RX ADMIN — BUPROPION HYDROCHLORIDE 75 MG: 75 TABLET, FILM COATED ORAL at 08:30

## 2024-05-04 RX ADMIN — HEPARIN SODIUM 5000 UNITS: 5000 INJECTION INTRAVENOUS; SUBCUTANEOUS at 22:03

## 2024-05-04 RX ADMIN — IPRATROPIUM BROMIDE 0.5 MG: 0.5 SOLUTION RESPIRATORY (INHALATION) at 19:58

## 2024-05-04 RX ADMIN — VANCOMYCIN HYDROCHLORIDE 750 MG: 750 INJECTION, SOLUTION INTRAVENOUS at 12:00

## 2024-05-04 RX ADMIN — CEFTRIAXONE SODIUM 2000 MG: 10 INJECTION, POWDER, FOR SOLUTION INTRAVENOUS at 13:05

## 2024-05-04 RX ADMIN — GABAPENTIN 100 MG: 100 CAPSULE ORAL at 22:02

## 2024-05-04 RX ADMIN — ASPIRIN 81 MG: 81 TABLET, COATED ORAL at 08:30

## 2024-05-04 RX ADMIN — IPRATROPIUM BROMIDE 0.5 MG: 0.5 SOLUTION RESPIRATORY (INHALATION) at 07:15

## 2024-05-04 RX ADMIN — PANTOPRAZOLE SODIUM 40 MG: 40 TABLET, DELAYED RELEASE ORAL at 14:21

## 2024-05-04 RX ADMIN — Medication 4.5 MG: at 22:03

## 2024-05-04 RX ADMIN — CEFEPIME 1000 MG: 2 INJECTION, POWDER, FOR SOLUTION INTRAVENOUS at 09:24

## 2024-05-04 RX ADMIN — IPRATROPIUM BROMIDE 0.5 MG: 0.5 SOLUTION RESPIRATORY (INHALATION) at 13:12

## 2024-05-04 RX ADMIN — METOPROLOL SUCCINATE 50 MG: 50 TABLET, EXTENDED RELEASE ORAL at 14:21

## 2024-05-04 RX ADMIN — HEPARIN SODIUM 5000 UNITS: 5000 INJECTION INTRAVENOUS; SUBCUTANEOUS at 06:15

## 2024-05-04 NOTE — ASSESSMENT & PLAN NOTE
Uses a walker for assistance at baseline  Will need PT/OT Eval for dispo planning  Fall precuations

## 2024-05-04 NOTE — ASSESSMENT & PLAN NOTE
Patient presented to the ER with lethargy and fevers for several days.  Met SIRS criteria on admission, evidenced by tachycardia, tachypnea, leukocytosis with an elevated procalcitonin  Patient also with septic shock criteria, evidenced by bandemia, DARLING with hypotension; requiring ICU admission for pressors  She was able to receive 2.5 L fluid bolus in the ER, but due to chronic combined HF with an EF of 35%, unable to give patient further fluid boluses.    Shock has now resolved.  Current hemodynamically stable.  Etiology of sepsis appears to be multifactorial - evidenced by pyelonephritis and pneumonia as seen on CT imaging.  MRSA Negative, strep pneumoniae/legionella negative.  Urine culture with gram - marta growth thus far.  Blood cultures x 2 + gram - rods, BCID e coli.  Procal downtrending.  DC iv cefepime and vanco  Currently febrile, hemodynamically stable.  Transition to IV ceftriaxone 2 g every 12 hours.  Follow-up with final culture sensitivity.  Overall guarded prognosis  Continue with supportive care.

## 2024-05-04 NOTE — ASSESSMENT & PLAN NOTE
Takes reports patient taking multiple agents for depression  Wellbutrin, Vrylar, and Viibryd  Currently receiving Wellbutrin, other medications are non-formulary.  Mood stable

## 2024-05-04 NOTE — PLAN OF CARE
Problem: PAIN - ADULT  Goal: Verbalizes/displays adequate comfort level or baseline comfort level  Description: Interventions:  - Encourage patient to monitor pain and request assistance  - Assess pain using appropriate pain scale  - Administer analgesics based on type and severity of pain and evaluate response  - Implement non-pharmacological measures as appropriate and evaluate response  - Consider cultural and social influences on pain and pain management  - Notify physician/advanced practitioner if interventions unsuccessful or patient reports new pain  Outcome: Progressing     Problem: INFECTION - ADULT  Goal: Absence or prevention of progression during hospitalization  Description: INTERVENTIONS:  - Assess and monitor for signs and symptoms of infection  - Monitor lab/diagnostic results  - Monitor all insertion sites, i.e. indwelling lines, tubes, and drains  - Monitor endotracheal if appropriate and nasal secretions for changes in amount and color  - [place] appropriate cooling/warming therapies per order  - Administer medications as ordered  - Instruct and encourage patient and family to use good hand hygiene technique  - Identify and instruct in appropriate isolation precautions for identified infection/condition  Outcome: Progressing  Goal: Absence of fever/infection during neutropenic period  Description: INTERVENTIONS:  - Monitor WBC    Outcome: Progressing

## 2024-05-04 NOTE — ASSESSMENT & PLAN NOTE
History of combined CHF with the most recent EF of 35%  Lifevest patient  GDMT includes Toprol XL, Entresto  Resume Toprol-XL, continue to hold Entresto since DARLING improving.  Monitor strict intake/output  Monitor weights

## 2024-05-04 NOTE — ASSESSMENT & PLAN NOTE
Chronically wears 4 L NC at baseline  Currently O2 saturation 95 to 97% on 4 L nasal cannula.  Recent history of COVID-19 infection in  02/2024  CT chest Persistent multifocal consolidations may represent sequela of prior pneumonia. However, recurrent pneumonia or chronic infectious or inflammatory process is not excluded   Currently on ceftriaxone  Continue to trend CBC, procalcitonin.

## 2024-05-04 NOTE — ASSESSMENT & PLAN NOTE
Present on admission, evidenced by a creatinine of 2.99 with a baseline of 0.7-1.1  Creatinine continues to trend down.  Etiology likely multifactorial, secondary to poor oral intake as well as ATN from septic shock/hypotension.  Continues to improve, 2.11  Monitor BMP daily  Continue to hold Entresto for now.  Avoid nephrotoxins, hypotension.

## 2024-05-04 NOTE — ASSESSMENT & PLAN NOTE
Noted history; + smoker  Follows with Pulmonology OP  Takes Advair at home.  Continue nebs.  Monitor respiratory status closely

## 2024-05-04 NOTE — RESPIRATORY THERAPY NOTE
RT Protocol Note  Lauren Quintero 78 y.o. female MRN: [id number]  Unit/Bed#: ICU 11 Encounter: [id number]    Assessment    Principal Problem:    Septic shock ([facility])  Active Problems:    Ambulatory dysfunction    Hypokalemia    Essential hypertension    Acute on chronic respiratory failure with hypoxia ([facility])    Coronary artery disease of native artery of native heart with stable angina pectoris ([facility])    Major depressive disorder, recurrent episode, moderate ([facility])    COPD (chronic obstructive pulmonary disease) ([facility])    Anemia    CKD (chronic kidney disease)    Chronic combined systolic and diastolic CHF (congestive heart failure) ([facility])    DARLING (acute kidney injury) ([facility])      Home Pulmonary Medications:    Home Devices/Therapy: Home O2    Past Medical History:   Diagnosis Date    Acute congestive heart failure ([facility]) 2021    Anxiety     Cardiac disease     Chest pain 2021    Chronic pain disorder     COPD (chronic obstructive pulmonary disease) ([facility])     COVID-19 02/15/2024    Depression     Heart disease     Hyperlipidemia     Hypertension     MI (myocardial infarction) ([facility])     MRSA (methicillin resistant Staphylococcus aureus)     Renal disorder     benign kidney tumor     Social History     Socioeconomic History    Marital status:      Spouse name: None    Number of children: 3    Years of education: 13    Highest education level: High school graduate   Occupational History    Occupation:      Employer: [employer]     Comment: retired    Tobacco Use    Smoking status: Former     Current packs/day: 0.00     Average packs/day: 1 pack/day for 60.0 years (60.0 ttl pk-yrs)     Types: Cigarettes     Start date:      Quit date: 2016     Years since quittin.3    Smokeless tobacco: Never    Tobacco comments:     She has close to a 60 pack-year smoking history, most recently has cut down to 4-5 cigarettes daily   Vaping Use    Vaping status: Never Used   Substance and  Sexual Activity    Alcohol use: Never    Drug use: No    Sexual activity: Not Currently   Other Topics Concern    None   Social History Narrative    None     Social Determinants of Health     Financial Resource Strain: Patient Declined (1/6/2024)    Received from [facility]    Overall Financial Resource Strain (CARDIA)     Difficulty of Paying Living Expenses: Patient declined   Food Insecurity: No Food Insecurity (5/3/2024)    Hunger Vital Sign     Worried About Running Out of Food in the Last Year: Never true     Ran Out of Food in the Last Year: Never true   Transportation Needs: No Transportation Needs (5/3/2024)    PRAPARE - Transportation     Lack of Transportation (Medical): No     Lack of Transportation (Non-Medical): No   Physical Activity: Patient Declined (1/6/2024)    Received from [facility] [employer]    Exercise Vital Sign     Days of Exercise per Week: Patient declined     Minutes of Exercise per Session: Patient declined   Stress: Patient Declined (1/6/2024)    Received from [facility]    [place] [facility] [employer] - Occupational Stress Questionnaire     Feeling of Stress : Patient declined   Social Connections: Patient Declined (1/6/2024)    Received from [facility]    Social Connection and Isolation Panel [NHANES]     Frequency of Communication with Friends and Family: Patient declined     Frequency of Social Gatherings with Friends and Family: Patient declined     Attends Nondenominational Services: Patient declined     Active Member of Clubs or Organizations: Patient declined     Attends Club or Organization Meetings: Patient declined     Marital Status: Patient declined   Intimate Partner Violence: Patient Declined (1/6/2024)    Received from [facility]    Humiliation, Afraid, Rape, and Kick questionnaire     Fear of Current or Ex-Partner: Patient declined     Emotionally Abused: Patient declined     Physically Abused: Patient declined     Sexually Abused: Patient  "declined   Housing Stability: Low Risk  (5/3/2024)    Housing Stability Vital Sign     Unable to Pay for Housing in the Last Year: No     Number of Places Lived in the Last Year: 1     Unstable Housing in the Last Year: No       Subjective         Objective    Physical Exam:        Vitals:  Blood pressure 124/64, pulse 99, temperature 98.4 °F (36.9 °C), temperature source Oral, resp. rate (!) 26, height 5' 7\" (1.702 m), weight 52.9 kg (116 lb 10 oz), SpO2 95%, not currently breastfeeding.          Imaging and other studies: I have personally reviewed pertinent reports.            Plan    Respiratory Plan: Mild Distress pathway        Resp Comments: Pt with hx of copd will continue with tid atrovent   "

## 2024-05-04 NOTE — ASSESSMENT & PLAN NOTE
Continue with home medications - Aspirin, pravastatin, metoprolol  History of Multivessel Disease as noted in 2/24; declined CABG.

## 2024-05-04 NOTE — PROGRESS NOTES
[facility]  Progress Note  Name: Lauren Quintero I  MRN: [id number]  Unit/Bed#: ICU 11 I Date of Admission: 5/2/2024   Date of Service: 5/4/2024 I Hospital Day: 2    Assessment/Plan   * Septic shock (HCC)  Assessment & Plan  Patient presented to the ER with lethargy and fevers for several days.  Met SIRS criteria on admission, evidenced by tachycardia, tachypnea, leukocytosis with an elevated procalcitonin  Patient also with septic shock criteria, evidenced by bandemia, DARLING with hypotension; requiring ICU admission for pressors  She was able to receive 2.5 L fluid bolus in the ER, but due to chronic combined HF with an EF of 35%, unable to give patient further fluid boluses.    Shock has now resolved.  Current hemodynamically stable.  Etiology of sepsis appears to be multifactorial - evidenced by pyelonephritis and pneumonia as seen on CT imaging.  MRSA Negative, strep pneumoniae/legionella negative.  Urine culture with gram - marta growth thus far.  Blood cultures x 2 + gram - rods, BCID e coli.  Procal downtrending.  DC iv cefepime and vanco  Currently febrile, hemodynamically stable.  Transition to IV ceftriaxone 2 g every 12 hours.  Follow-up with final culture sensitivity.  Overall guarded prognosis  Continue with supportive care.      Acute kidney injury superimposed on chronic kidney disease  (HCC)  Assessment & Plan  Present on admission, evidenced by a creatinine of 2.99 with a baseline of 0.7-1.1  Creatinine continues to trend down.  Etiology likely multifactorial, secondary to poor oral intake as well as ATN from septic shock/hypotension.  Continues to improve, 2.11  Monitor BMP daily  Continue to hold Entresto for now.  Avoid nephrotoxins, hypotension.    Chronic combined systolic and diastolic CHF (congestive heart failure) (HCC)  Assessment & Plan  History of combined CHF with the most recent EF of 35%  Lifevest patient  GDMT includes Toprol XL, Entresto  Resume Toprol-XL, continue  to hold Entresto since DARLING improving.  Monitor strict intake/output  Monitor weights    Anemia  Assessment & Plan  Noted history  Stable at this time within her baseline    COPD (chronic obstructive pulmonary disease) (HCC)  Assessment & Plan  Noted history; + smoker  Follows with Pulmonology OP  Takes Advair at home.  Continue nebs.  Monitor respiratory status closely    Major depressive disorder, recurrent episode, moderate (HCC)  Assessment & Plan  Takes reports patient taking multiple agents for depression  Wellbutrin, Vrylar, and Viibryd  Currently receiving Wellbutrin, other medications are non-formulary.  Mood stable    Coronary artery disease of native artery of native heart with stable angina pectoris (HCC)  Assessment & Plan  Continue with home medications - Aspirin, pravastatin, metoprolol  History of Multivessel Disease as noted in 2/24; declined CABG.      Acute on chronic respiratory failure with hypoxia ([facility])  Assessment & Plan  Chronically wears 4 L NC at baseline  Currently O2 saturation 95 to 97% on 4 L nasal cannula.  Recent history of COVID-19 infection in  02/2024  CT chest Persistent multifocal consolidations may represent sequela of prior pneumonia. However, recurrent pneumonia or chronic infectious or inflammatory process is not excluded   Currently on ceftriaxone  Continue to trend CBC, procalcitonin.    Essential hypertension  Assessment & Plan  Patient does have history of hypertension, takes Toprol XL/Entresto at home.  She was noted to have hypotension on admission, secondary to septic shock, therefore these medications are currently on hold.    Shock is now resolved.  BP stable  Restart Toprol XL  Hold Entresto for now due to acute kidney injury improving  Monitor vital signs closely    Ambulatory dysfunction  Assessment & Plan  Uses a walker for assistance at baseline  Will need PT/OT Eval for dispo planning  Fall precuations               VTE Pharmacologic Prophylaxis:   Moderate Risk  (Score 3-4) - Pharmacological DVT Prophylaxis Ordered: heparin.    Mobility:   Basic Mobility Inpatient Raw Score: 15  -[facility] Goal: 4: Move to chair/commode  -[facility] Achieved: 2: Bed activities/Dependent transfer      Patient Centered Rounds: I performed bedside rounds with nursing staff today.   Discussions with Specialists or Other Care Team Provider:     Education and Discussions with Family / Patient: Updated  (son) via phone.  Spoke with son Imer over the phone at length at 1:10pm.     Total Time Spent on Date of Encounter in care of patient: 35 mins. This time was spent on one or more of the following: performing physical exam; counseling and coordination of care; obtaining or reviewing history; documenting in the medical record; reviewing/ordering tests, medications or procedures; communicating with other healthcare professionals and discussing with patient's family/caregivers.    Current Length of Stay: 2 day(s)  Current Patient Status: Inpatient   Certification Statement: The patient will continue to require additional inpatient hospital stay due to septic shock, currently on IV antibiotics,  Discharge Plan: Anticipate discharge in 48 hrs to discharge location to be determined pending rehab evaluations.    Code Status: Level 1 - Full Code    Subjective:     ICU downgrade.    Seen during a.m. rounds.  Appears comfortable not in distress.  Patient currently denies chest pain, dyspnea, nausea, vomiting, abdominal pain, dysuria, hematuria, diarrhea, any other new complaints.  No other events reported.    Objective:     Vitals:   Temp (24hrs), Av °F (36.7 °C), Min:97.6 °F (36.4 °C), Max:98.4 °F (36.9 °C)    Temp:  [97.6 °F (36.4 °C)-98.4 °F (36.9 °C)] 98 °F (36.7 °C)  HR:  [78-99] 96  Resp:  [17-30] 27  BP: (109-150)/(57-80) 150/80  SpO2:  [93 %-98 %] 95 %  Body mass index is 18.27 kg/m².     Input and Output Summary (last 24 hours):     Intake/Output Summary (Last 24 hours) at 2024  1303  Last data filed at 5/4/2024 1128  Gross per 24 hour   Intake 580 ml   Output 1275 ml   Net -695 ml       Physical Exam:   Physical Exam  Constitutional:       General: She is not in acute distress.     Appearance: She is not ill-appearing, toxic-appearing or diaphoretic.      Comments: Elderly female patient, chronically ill/deconditioning appearing, acutely nontoxic appearing.  Currently wearing a LifeVest.   HENT:      Head: Normocephalic and atraumatic.   Eyes:      Pupils: Pupils are equal, round, and reactive to light.   Cardiovascular:      Rate and Rhythm: Normal rate.      Pulses: Normal pulses.   Pulmonary:      Effort: Pulmonary effort is normal. No respiratory distress.      Breath sounds: Rhonchi present. No rales.      Comments: O2 saturation 95 to 97% on 4 L nasal cannula.  Abdominal:      General: Bowel sounds are normal. There is no distension.      Palpations: Abdomen is soft.      Tenderness: There is no abdominal tenderness.   Musculoskeletal:      Right lower leg: No edema.      Left lower leg: No edema.   Neurological:      Mental Status: She is alert and oriented to person, place, and time. Mental status is at baseline.   Psychiatric:         Mood and Affect: Mood normal.         Behavior: Behavior normal.          Additional Data:     Labs:  Results from last 7 days   Lab Units 05/04/24  0452 05/03/24  0550   WBC Thousand/uL 18.23* 18.40*   HEMOGLOBIN g/dL 8.1* 8.0*   HEMATOCRIT % 26.1* 25.8*   PLATELETS Thousands/uL 130* 120*   BANDS PCT %  --  8   LYMPHO PCT %  --  6*   MONO PCT %  --  1*   EOS PCT %  --  0     Results from last 7 days   Lab Units 05/04/24  0452 05/03/24  0550   SODIUM mmol/L 138 140   POTASSIUM mmol/L 4.0 4.4   CHLORIDE mmol/L 108 109*   CO2 mmol/L 24 24   BUN mg/dL 60* 55*   CREATININE mg/dL 2.11* 2.59*   ANION GAP mmol/L 6 7   CALCIUM mg/dL 8.8 7.8*   ALBUMIN g/dL  --  2.8*   TOTAL BILIRUBIN mg/dL  --  0.39   ALK PHOS U/L  --  63   ALT U/L  --  3*   AST U/L  --  13    GLUCOSE RANDOM mg/dL 156* 139     Results from last 7 days   Lab Units 05/03/24  0550   INR  1.31*     Results from last 7 days   Lab Units 05/02/24  2301 05/02/24  1909   POC GLUCOSE mg/dl 171* 146*         Results from last 7 days   Lab Units 05/04/24  0452 05/03/24  0550 05/02/24  1924   LACTIC ACID mmol/L  --   --  1.2   PROCALCITONIN ng/ml 79.62* 141.27* 225.38*       Lines/Drains:  Invasive Devices       Peripheral Intravenous Line  Duration             Peripheral IV 05/04/24 Dorsal (posterior);Right Hand <1 day                      Telemetry:  Telemetry Orders (From admission, onward)               LifeVest Patient: Continuous Telemetry Monitoring during hospitalization (non-expiring)  Continuous LifeVest Telemetry Monitoring        References:    LifeVest Policy        LifeVest Patient: Continuous Telemetry Monitoring during hospitalization (non-expiring)  (Lifevest Evaluation for removal/continue)  Continuous LifeVest Telemetry Monitoring        References:    LifeVest Policy                     Telemetry Reviewed: Normal Sinus Rhythm  Indication for Continued Telemetry Use: Lifevest (remains on tele entire hospital stay)             Imaging: Reviewed radiology reports from this admission including: chest CT scan and abdominal/pelvic CT    Recent Cultures (last 7 days):   Results from last 7 days   Lab Units 05/03/24  0005 05/02/24  1944 05/02/24  1925 05/02/24 1924   BLOOD CULTURE   --   --   --  Escherichia coli*   GRAM STAIN RESULT   --   --  Gram negative rods* Gram negative rods*   URINE CULTURE   --  >100,000 cfu/ml Escherichia coli*  --   --    LEGIONELLA URINARY ANTIGEN  Negative  --   --   --        Last 24 Hours Medication List:   Current Facility-Administered Medications   Medication Dose Route Frequency Provider Last Rate    albuterol  2.5 mg Nebulization Q4H PRN TAMMIE Pathak      aspirin  81 mg Oral Daily TAMMIE Pathak      buPROPion  75 mg Oral Daily  TAMMIE Pathak      cefTRIAXone  2,000 mg Intravenous Q12H Domenic YAÑEZ MD      gabapentin  100 mg Oral HS Domenic YAÑEZ MD      heparin (porcine)  5,000 Units Subcutaneous Q8H [facility] TAMMIE Pathak      ipratropium  0.5 mg Nebulization TID TAMMIE Pathak      melatonin  4.5 mg Oral HS Domenic YAÑEZ MD      metoprolol succinate  50 mg Oral Daily Domenic YAÑEZ MD      pantoprazole  40 mg Oral Daily Domenic YAÑEZ MD          Today, Patient Was Seen By: Domenic Wylie MD    **Please Note: This note may have been constructed using a voice recognition system.**

## 2024-05-05 LAB
ANION GAP SERPL CALCULATED.3IONS-SCNC: 5 MMOL/L (ref 4–13)
BACTERIA BLD CULT: ABNORMAL
BACTERIA BLD CULT: ABNORMAL
BASOPHILS # BLD AUTO: 0.02 THOUSANDS/ÂΜL (ref 0–0.1)
BASOPHILS NFR BLD AUTO: 0 % (ref 0–1)
BUN SERPL-MCNC: 57 MG/DL (ref 5–25)
CA-I BLD-SCNC: 1.23 MMOL/L (ref 1.12–1.32)
CALCIUM SERPL-MCNC: 9.1 MG/DL (ref 8.4–10.2)
CHLORIDE SERPL-SCNC: 106 MMOL/L (ref 96–108)
CO2 SERPL-SCNC: 28 MMOL/L (ref 21–32)
CREAT SERPL-MCNC: 1.63 MG/DL (ref 0.6–1.3)
E COLI DNA BLD POS QL NAA+NON-PROBE: DETECTED
EOSINOPHIL # BLD AUTO: 0 THOUSAND/ÂΜL (ref 0–0.61)
EOSINOPHIL NFR BLD AUTO: 0 % (ref 0–6)
ERYTHROCYTE [DISTWIDTH] IN BLOOD BY AUTOMATED COUNT: 15.4 % (ref 11.6–15.1)
GFR SERPL CREATININE-BSD FRML MDRD: 30 ML/MIN/1.73SQ M
GLUCOSE SERPL-MCNC: 128 MG/DL (ref 65–140)
GLUCOSE SERPL-MCNC: 92 MG/DL (ref 65–140)
GRAM STN SPEC: ABNORMAL
GRAM STN SPEC: ABNORMAL
HCT VFR BLD AUTO: 30.9 % (ref 34.8–46.1)
HGB BLD-MCNC: 9.6 G/DL (ref 11.5–15.4)
IMM GRANULOCYTES # BLD AUTO: 0.2 THOUSAND/UL (ref 0–0.2)
IMM GRANULOCYTES NFR BLD AUTO: 2 % (ref 0–2)
LYMPHOCYTES # BLD AUTO: 0.56 THOUSANDS/ÂΜL (ref 0.6–4.47)
LYMPHOCYTES NFR BLD AUTO: 4 % (ref 14–44)
MAGNESIUM SERPL-MCNC: 1.8 MG/DL (ref 1.9–2.7)
MCH RBC QN AUTO: 30.4 PG (ref 26.8–34.3)
MCHC RBC AUTO-ENTMCNC: 31.1 G/DL (ref 31.4–37.4)
MCV RBC AUTO: 98 FL (ref 82–98)
MONOCYTES # BLD AUTO: 0.82 THOUSAND/ÂΜL (ref 0.17–1.22)
MONOCYTES NFR BLD AUTO: 6 % (ref 4–12)
NEUTROPHILS # BLD AUTO: 11.24 THOUSANDS/ÂΜL (ref 1.85–7.62)
NEUTS SEG NFR BLD AUTO: 88 % (ref 43–75)
NRBC BLD AUTO-RTO: 0 /100 WBCS
PHOSPHATE SERPL-MCNC: 1.4 MG/DL (ref 2.3–4.1)
PHOSPHATE SERPL-MCNC: 1.5 MG/DL (ref 2.3–4.1)
PLATELET # BLD AUTO: 129 THOUSANDS/UL (ref 149–390)
PMV BLD AUTO: 10.6 FL (ref 8.9–12.7)
POTASSIUM SERPL-SCNC: 3.8 MMOL/L (ref 3.5–5.3)
PROCALCITONIN SERPL-MCNC: 38.59 NG/ML
RBC # BLD AUTO: 3.16 MILLION/UL (ref 3.81–5.12)
SODIUM SERPL-SCNC: 139 MMOL/L (ref 135–147)
WBC # BLD AUTO: 12.84 THOUSAND/UL (ref 4.31–10.16)

## 2024-05-05 PROCEDURE — 94760 N-INVAS EAR/PLS OXIMETRY 1: CPT

## 2024-05-05 PROCEDURE — 80048 BASIC METABOLIC PNL TOTAL CA: CPT | Performed by: NURSE PRACTITIONER

## 2024-05-05 PROCEDURE — 94664 DEMO&/EVAL PT USE INHALER: CPT

## 2024-05-05 PROCEDURE — 84145 PROCALCITONIN (PCT): CPT | Performed by: STUDENT IN AN ORGANIZED HEALTH CARE EDUCATION/TRAINING PROGRAM

## 2024-05-05 PROCEDURE — 84100 ASSAY OF PHOSPHORUS: CPT | Performed by: STUDENT IN AN ORGANIZED HEALTH CARE EDUCATION/TRAINING PROGRAM

## 2024-05-05 PROCEDURE — 99232 SBSQ HOSP IP/OBS MODERATE 35: CPT | Performed by: STUDENT IN AN ORGANIZED HEALTH CARE EDUCATION/TRAINING PROGRAM

## 2024-05-05 PROCEDURE — 82330 ASSAY OF CALCIUM: CPT | Performed by: NURSE PRACTITIONER

## 2024-05-05 PROCEDURE — 94640 AIRWAY INHALATION TREATMENT: CPT

## 2024-05-05 PROCEDURE — 82948 REAGENT STRIP/BLOOD GLUCOSE: CPT

## 2024-05-05 PROCEDURE — 84100 ASSAY OF PHOSPHORUS: CPT | Performed by: NURSE PRACTITIONER

## 2024-05-05 PROCEDURE — 85025 COMPLETE CBC W/AUTO DIFF WBC: CPT | Performed by: NURSE PRACTITIONER

## 2024-05-05 PROCEDURE — 83735 ASSAY OF MAGNESIUM: CPT | Performed by: NURSE PRACTITIONER

## 2024-05-05 RX ORDER — MAGNESIUM SULFATE HEPTAHYDRATE 40 MG/ML
2 INJECTION, SOLUTION INTRAVENOUS ONCE
Status: COMPLETED | OUTPATIENT
Start: 2024-05-05 | End: 2024-05-05

## 2024-05-05 RX ORDER — METOPROLOL TARTRATE 50 MG/1
50 TABLET, FILM COATED ORAL EVERY 12 HOURS SCHEDULED
Status: DISCONTINUED | OUTPATIENT
Start: 2024-05-05 | End: 2024-05-05

## 2024-05-05 RX ORDER — METOPROLOL TARTRATE 50 MG/1
50 TABLET, FILM COATED ORAL ONCE
Status: COMPLETED | OUTPATIENT
Start: 2024-05-05 | End: 2024-05-05

## 2024-05-05 RX ORDER — METOPROLOL SUCCINATE 100 MG/1
100 TABLET, EXTENDED RELEASE ORAL DAILY
Status: DISCONTINUED | OUTPATIENT
Start: 2024-05-06 | End: 2024-05-15 | Stop reason: HOSPADM

## 2024-05-05 RX ORDER — QUETIAPINE FUMARATE 25 MG/1
12.5 TABLET, FILM COATED ORAL
Status: DISCONTINUED | OUTPATIENT
Start: 2024-05-05 | End: 2024-05-06

## 2024-05-05 RX ADMIN — SODIUM PHOSPHATE, MONOBASIC, MONOHYDRATE AND SODIUM PHOSPHATE, DIBASIC, ANHYDROUS 6 MMOL: 142; 276 INJECTION, SOLUTION INTRAVENOUS at 18:30

## 2024-05-05 RX ADMIN — HEPARIN SODIUM 5000 UNITS: 5000 INJECTION INTRAVENOUS; SUBCUTANEOUS at 14:48

## 2024-05-05 RX ADMIN — IPRATROPIUM BROMIDE 0.5 MG: 0.5 SOLUTION RESPIRATORY (INHALATION) at 20:10

## 2024-05-05 RX ADMIN — HEPARIN SODIUM 5000 UNITS: 5000 INJECTION INTRAVENOUS; SUBCUTANEOUS at 05:40

## 2024-05-05 RX ADMIN — QUETIAPINE FUMARATE 12.5 MG: 25 TABLET ORAL at 21:18

## 2024-05-05 RX ADMIN — IPRATROPIUM BROMIDE 0.5 MG: 0.5 SOLUTION RESPIRATORY (INHALATION) at 13:08

## 2024-05-05 RX ADMIN — IPRATROPIUM BROMIDE 0.5 MG: 0.5 SOLUTION RESPIRATORY (INHALATION) at 07:15

## 2024-05-05 RX ADMIN — Medication 4.5 MG: at 21:17

## 2024-05-05 RX ADMIN — METOPROLOL TARTRATE 50 MG: 50 TABLET, FILM COATED ORAL at 18:30

## 2024-05-05 RX ADMIN — ASPIRIN 81 MG: 81 TABLET, COATED ORAL at 08:49

## 2024-05-05 RX ADMIN — HEPARIN SODIUM 5000 UNITS: 5000 INJECTION INTRAVENOUS; SUBCUTANEOUS at 21:18

## 2024-05-05 RX ADMIN — CEFTRIAXONE SODIUM 2000 MG: 10 INJECTION, POWDER, FOR SOLUTION INTRAVENOUS at 01:02

## 2024-05-05 RX ADMIN — PANTOPRAZOLE SODIUM 40 MG: 40 TABLET, DELAYED RELEASE ORAL at 08:49

## 2024-05-05 RX ADMIN — CEFTRIAXONE SODIUM 2000 MG: 10 INJECTION, POWDER, FOR SOLUTION INTRAVENOUS at 14:48

## 2024-05-05 RX ADMIN — GABAPENTIN 100 MG: 100 CAPSULE ORAL at 21:17

## 2024-05-05 RX ADMIN — METOPROLOL SUCCINATE 50 MG: 50 TABLET, EXTENDED RELEASE ORAL at 08:49

## 2024-05-05 RX ADMIN — MAGNESIUM SULFATE HEPTAHYDRATE 2 G: 40 INJECTION, SOLUTION INTRAVENOUS at 08:41

## 2024-05-05 RX ADMIN — BUPROPION HYDROCHLORIDE 75 MG: 75 TABLET, FILM COATED ORAL at 08:49

## 2024-05-05 NOTE — ASSESSMENT & PLAN NOTE
Patient does have history of hypertension, takes Toprol XL/Entresto at home.  She was noted to have hypotension on admission, secondary to septic shock, therefore these medications are currently on hold.    Shock is now resolved.  BP stable  Restart Toprol XL  Hold Entresto for now due to acute kidney injury improving  Monitor vital signs closely

## 2024-05-05 NOTE — ASSESSMENT & PLAN NOTE
Present on admission, evidenced by a creatinine of 2.99 with a baseline of 0.7-1.1  Creatinine continues to trend down.  Etiology likely multifactorial, secondary to poor oral intake as well as ATN from septic shock/hypotension.  Continues to improve, 1.63  Monitor BMP daily  Continue to hold Entresto for now.  Avoid nephrotoxins, hypotension.

## 2024-05-05 NOTE — RESPIRATORY THERAPY NOTE
RT Protocol Note  Lauren Quintero 78 y.o. female MRN: [id number]  Unit/Bed#: ICU 11 Encounter: [id number]    Assessment    Principal Problem:    Septic shock (HCC)  Active Problems:    Ambulatory dysfunction    Essential hypertension    Acute on chronic respiratory failure with hypoxia (HCC)    Coronary artery disease of native artery of native heart with stable angina pectoris ([facility])    Major depressive disorder, recurrent episode, moderate (HCC)    COPD (chronic obstructive pulmonary disease) ([facility])    Anemia    Chronic combined systolic and diastolic CHF (congestive heart failure) ([facility])    Acute kidney injury superimposed on chronic kidney disease  ([facility])      Home Pulmonary Medications:    Home Devices/Therapy: Home O2    Past Medical History:   Diagnosis Date    Acute congestive heart failure ([facility]) 2021    Anxiety     Cardiac disease     Chest pain 2021    Chronic pain disorder     COPD (chronic obstructive pulmonary disease) ([facility])     COVID-19 02/15/2024    Depression     Heart disease     Hyperlipidemia     Hypertension     MI (myocardial infarction) ([facility])     MRSA (methicillin resistant Staphylococcus aureus)     Renal disorder     benign kidney tumor     Social History     Socioeconomic History    Marital status:      Spouse name: None    Number of children: 3    Years of education: 13    Highest education level: High school graduate   Occupational History    Occupation: [employer]     Employer: [employer]     Comment: retired    Tobacco Use    Smoking status: Former     Current packs/day: 0.00     Average packs/day: 1 pack/day for 60.0 years (60.0 ttl pk-yrs)     Types: Cigarettes     Start date:      Quit date: 2016     Years since quittin.3    Smokeless tobacco: Never    Tobacco comments:     She has close to a 60 pack-year smoking history, most recently has cut down to 4-5 cigarettes daily   Vaping Use    Vaping status: Never Used   Substance and Sexual  Activity    Alcohol use: Never    Drug use: No    Sexual activity: Not Currently   Other Topics Concern    None   Social History Narrative    None     Social Determinants of Health     Financial Resource Strain: Patient Declined (1/6/2024)    Received from [facility]    Overall Financial Resource Strain (CARDIA)     Difficulty of Paying Living Expenses: Patient declined   Food Insecurity: No Food Insecurity (5/3/2024)    Hunger Vital Sign     Worried About Running Out of Food in the Last Year: Never true     Ran Out of Food in the Last Year: Never true   Transportation Needs: No Transportation Needs (5/3/2024)    PRAPARE - Transportation     Lack of Transportation (Medical): No     Lack of Transportation (Non-Medical): No   Physical Activity: Patient Declined (1/6/2024)    Received from [facility] [facility]    Exercise Vital Sign     Days of Exercise per Week: Patient declined     Minutes of Exercise per Session: Patient declined   Stress: Patient Declined (1/6/2024)    Received from [facility]    [place] [employer] - Occupational Stress Questionnaire     Feeling of Stress : Patient declined   Social Connections: Patient Declined (1/6/2024)    Received from [facility]    Social Connection and Isolation Panel [NHANES]     Frequency of Communication with Friends and Family: Patient declined     Frequency of Social Gatherings with Friends and Family: Patient declined     Attends Jain Services: Patient declined     Active Member of Clubs or Organizations: Patient declined     Attends Club or Organization Meetings: Patient declined     Marital Status: Patient declined   Intimate Partner Violence: Patient Declined (1/6/2024)    Received from [facility]    Humiliation, Afraid, Rape, and Kick questionnaire     Fear of Current or Ex-Partner: Patient declined     Emotionally Abused: Patient declined     Physically Abused: Patient declined     Sexually Abused: Patient declined  "  Housing Stability: Low Risk  (5/3/2024)    Housing Stability Vital Sign     Unable to Pay for Housing in the Last Year: No     Number of Places Lived in the Last Year: 1     Unstable Housing in the Last Year: No       Subjective         Objective    Physical Exam:        Vitals:  Blood pressure 165/83, pulse 98, temperature 97.9 °F (36.6 °C), temperature source Oral, resp. rate (!) 34, height 5' 7\" (1.702 m), weight 52.9 kg (116 lb 10 oz), SpO2 97%, not currently breastfeeding.          Imaging and other studies: I have personally reviewed pertinent reports.            Plan    Respiratory Plan: Mild Distress pathway        Resp Comments: pt with hx of copd will continue with home regof tid atrovent   "

## 2024-05-05 NOTE — PROGRESS NOTES
[facility]  Progress Note  Name: Lauren Quintero I  MRN: [id number]  Unit/Bed#: ICU 11 I Date of Admission: 5/2/2024   Date of Service: 5/5/2024 I Hospital Day: 3    Assessment/Plan   * Septic shock (HCC)  Assessment & Plan  Patient presented to the ER with lethargy and fevers for several days.  Met SIRS criteria on admission, evidenced by tachycardia, tachypnea, leukocytosis with an elevated procalcitonin  Patient also with septic shock criteria, evidenced by bandemia, DARLING with hypotension; requiring ICU admission for pressors  She was able to receive 2.5 L fluid bolus in the ER, but due to chronic combined HF with an EF of 35%, unable to give patient further fluid boluses.    Shock has now resolved.  Current hemodynamically stable.  Etiology of sepsis appears to be multifactorial - evidenced by pyelonephritis and pneumonia as seen on CT imaging.  MRSA Negative, strep pneumoniae/legionella negative.  Urine culture with gram - marta growth thus far.  Blood cultures x 2 + gram - rods, BCID e coli.  Procal downtrending.  DC iv cefepime and vanco  Currently febrile, hemodynamically stable.  Continue  IV ceftriaxone 2 g every 12 hours.  Follow-up with final culture sensitivity.  Overall guarded prognosis  Continue with supportive care.      Acute kidney injury superimposed on chronic kidney disease  (HCC)  Assessment & Plan  Present on admission, evidenced by a creatinine of 2.99 with a baseline of 0.7-1.1  Creatinine continues to trend down.  Etiology likely multifactorial, secondary to poor oral intake as well as ATN from septic shock/hypotension.  Continues to improve, 1.63  Monitor BMP daily  Continue to hold Entresto for now.  Avoid nephrotoxins, hypotension.    Chronic combined systolic and diastolic CHF (congestive heart failure) (HCC)  Assessment & Plan  History of combined CHF with the most recent EF of 35%  Pt took off Life vest : reports she will wear at home.  Continue telemetry  monitor.  GDMT includes Toprol XL, Entresto  Resume Toprol-XL, continue to hold Entresto since DARLING improving.  Monitor strict intake/output  Monitor weights    Anemia  Assessment & Plan  Noted history  Stable at this time within her baseline    COPD (chronic obstructive pulmonary disease) (HCC)  Assessment & Plan  Noted history; + smoker  Follows with Pulmonology OP  Takes Advair at home.  Continue nebs.  Monitor respiratory status closely    Major depressive disorder, recurrent episode, moderate (HCC)  Assessment & Plan  Takes reports patient taking multiple agents for depression  Wellbutrin, Vrylar, and Viibryd  Currently receiving Wellbutrin, other medications are non-formulary.  Mood stable    Coronary artery disease of native artery of native heart with stable angina pectoris (HCC)  Assessment & Plan  Continue with home medications - Aspirin, pravastatin, metoprolol  History of Multivessel Disease as noted in 2/24; declined CABG.      Acute on chronic respiratory failure with hypoxia (HCC)  Assessment & Plan  Chronically wears 4 L NC at baseline  Currently O2 saturation 95 to 97% on 4 L nasal cannula.  Recent history of COVID-19 infection in  02/2024  CT chest Persistent multifocal consolidations may represent sequela of prior pneumonia. However, recurrent pneumonia or chronic infectious or inflammatory process is not excluded   Currently on ceftriaxone  Continue to trend CBC, procalcitonin.    Essential hypertension  Assessment & Plan  Patient does have history of hypertension, takes Toprol XL/Entresto at home.  She was noted to have hypotension on admission, secondary to septic shock, therefore these medications are currently on hold.    Shock is now resolved.  BP stable  Restart Toprol XL  Hold Entresto for now due to acute kidney injury improving  Monitor vital signs closely    Ambulatory dysfunction  Assessment & Plan  Uses a walker for assistance at baseline  Will need PT/OT Eval for dispo planning  Fall  precuations               VTE Pharmacologic Prophylaxis:   Moderate Risk (Score 3-4) - Pharmacological DVT Prophylaxis Ordered: heparin.    Mobility:   Basic Mobility Inpatient Raw Score: 15  -[facility] Goal: 4: Move to chair/commode  -[facility] Achieved: 4: Move to chair/commode      Patient Centered Rounds: I performed bedside rounds with nursing staff today.     Total Time Spent on Date of Encounter in care of patient: 25 mins. This time was spent on one or more of the following: performing physical exam; counseling and coordination of care; obtaining or reviewing history; documenting in the medical record; reviewing/ordering tests, medications or procedures; communicating with other healthcare professionals and discussing with patient's family/caregivers.    Current Length of Stay: 3 day(s)  Current Patient Status: Inpatient   Certification Statement: The patient will continue to require additional inpatient hospital stay due to Bacteremia currently on IV antibiotics  Discharge Plan: Anticipate discharge in 48 hrs to discharge location to be determined pending rehab evaluations.    Code Status: Level 1 - Full Code    Subjective:   Seen during a.m. rounds.  Patient appears comfortable nondistressed.  Patient had taken her LifeVest off overnight due to discomfort.  Reports that she will keep LifeVest on when she goes home.  Currently she denies chest pain, dyspnea, fever, chills, nausea, vomiting, any other new complaints.  Seen sitting in a chair and appears comfortable.  No other events reported.    Objective:     Vitals:   Temp (24hrs), Av.9 °F (36.6 °C), Min:97.8 °F (36.6 °C), Max:98.1 °F (36.7 °C)    Temp:  [97.8 °F (36.6 °C)-98.1 °F (36.7 °C)] 97.9 °F (36.6 °C)  HR:  [] 98  Resp:  [24-37] 34  BP: (133-165)/() 165/83  SpO2:  [96 %-100 %] 99 %  Body mass index is 18.27 kg/m².     Input and Output Summary (last 24 hours):     Intake/Output Summary (Last 24 hours) at 2024 6809  Last data filed at  5/4/2024 2257  Gross per 24 hour   Intake --   Output 700 ml   Net -700 ml       Physical Exam:   Physical Exam  Constitutional:       General: She is not in acute distress.     Appearance: She is not ill-appearing, toxic-appearing or diaphoretic.      Comments: Elderly female patient, chronically ill/deconditioning appearing, acutely nontoxic appearing.  Currently wearing a LifeVest.   HENT:      Head: Normocephalic and atraumatic.   Eyes:      Pupils: Pupils are equal, round, and reactive to light.   Cardiovascular:      Rate and Rhythm: Normal rate.      Pulses: Normal pulses.   Pulmonary:      Effort: Pulmonary effort is normal. No respiratory distress.      Breath sounds: No rales.      Comments: O2 saturation 95 to 97% on 4 L nasal cannula.  Abdominal:      General: Bowel sounds are normal. There is no distension.      Palpations: Abdomen is soft.      Tenderness: There is no abdominal tenderness.   Musculoskeletal:      Right lower leg: No edema.      Left lower leg: No edema.   Neurological:      Mental Status: She is alert and oriented to person, place, and time. Mental status is at baseline.   Psychiatric:         Mood and Affect: Mood normal.         Behavior: Behavior normal.          Additional Data:     Labs:  Results from last 7 days   Lab Units 05/05/24  0549 05/04/24  0452 05/03/24  0550   WBC Thousand/uL 12.84*   < > 18.40*   HEMOGLOBIN g/dL 9.6*   < > 8.0*   HEMATOCRIT % 30.9*   < > 25.8*   PLATELETS Thousands/uL 129*   < > 120*   BANDS PCT %  --   --  8   SEGS PCT % 88*  --   --    LYMPHO PCT % 4*  --  6*   MONO PCT % 6  --  1*   EOS PCT % 0  --  0    < > = values in this interval not displayed.     Results from last 7 days   Lab Units 05/05/24  0549 05/04/24  0452 05/03/24  0550   SODIUM mmol/L 139   < > 140   POTASSIUM mmol/L 3.8   < > 4.4   CHLORIDE mmol/L 106   < > 109*   CO2 mmol/L 28   < > 24   BUN mg/dL 57*   < > 55*   CREATININE mg/dL 1.63*   < > 2.59*   ANION GAP mmol/L 5   < > 7    CALCIUM mg/dL 9.1   < > 7.8*   ALBUMIN g/dL  --   --  2.8*   TOTAL BILIRUBIN mg/dL  --   --  0.39   ALK PHOS U/L  --   --  63   ALT U/L  --   --  3*   AST U/L  --   --  13   GLUCOSE RANDOM mg/dL 92   < > 139    < > = values in this interval not displayed.     Results from last 7 days   Lab Units 05/03/24  0550   INR  1.31*     Results from last 7 days   Lab Units 05/02/24  2301 05/02/24  1909   POC GLUCOSE mg/dl 171* 146*         Results from last 7 days   Lab Units 05/05/24  0549 05/04/24  0452 05/03/24  0550 05/02/24  1924   LACTIC ACID mmol/L  --   --   --  1.2   PROCALCITONIN ng/ml 38.59* 79.62* 141.27* 225.38*       Lines/Drains:  Invasive Devices       Peripheral Intravenous Line  Duration             Peripheral IV 05/05/24 Left Antecubital <1 day                      Telemetry:  Telemetry Orders (From admission, onward)               LifeVest Patient: Continuous Telemetry Monitoring during hospitalization (non-expiring)  Continuous LifeVest Telemetry Monitoring        References:    LifeVest Policy        LifeVest Patient: Continuous Telemetry Monitoring during hospitalization (non-expiring)  (Lifevest Evaluation for removal/continue)  Continuous LifeVest Telemetry Monitoring        References:    LifeVest Policy                     Telemetry Reviewed: Normal Sinus Rhythm  Indication for Continued Telemetry Use: Lifevest (remains on tele entire hospital stay)               Recent Cultures (last 7 days):   Results from last 7 days   Lab Units 05/03/24  0005 05/02/24  1944 05/02/24 1925 05/02/24 1924   BLOOD CULTURE   --   --  Escherichia coli* Escherichia coli*   GRAM STAIN RESULT   --   --  Gram negative rods* Gram negative rods*   URINE CULTURE   --  >100,000 cfu/ml Escherichia coli*  50,000-59,000 cfu/ml Beta Hemolytic Streptococcus Group B*  --   --    LEGIONELLA URINARY ANTIGEN  Negative  --   --   --        Last 24 Hours Medication List:   Current Facility-Administered Medications   Medication Dose  Route Frequency Provider Last Rate    albuterol  2.5 mg Nebulization Q4H PRN TAMMIE Pathak      aspirin  81 mg Oral Daily TAMMIE Pathak      buPROPion  75 mg Oral Daily TAMMIE Pathak      cefTRIAXone  2,000 mg Intravenous Q12H Domenic YAÑEZ MD 2,000 mg (05/05/24 0102)    gabapentin  100 mg Oral HS Domenic YAÑEZ MD      heparin (porcine)  5,000 Units Subcutaneous Q8H [facility] TAMMIE Pathak      ipratropium  0.5 mg Nebulization TID TAMMIE Pathak      melatonin  4.5 mg Oral HS Domenic YAÑEZ MD      metoprolol succinate  50 mg Oral Daily Domenic YAÑEZ MD      pantoprazole  40 mg Oral Daily Domenic YAÑEZ MD          Today, Patient Was Seen By: Domenic Wylie MD    **Please Note: This note may have been constructed using a voice recognition system.**

## 2024-05-05 NOTE — QUICK NOTE
Contacted via TT by bedside nurse and informed patient removed life vest. At bedside the patient states she is tired of wearing it and she refuses to put Life Vest back. This provider and nurse at bedside explained the risk it represents not having it on, patient repeatedly expressed her wishes of not having the Life Vest despite consequences. She also reports discomfort at site. On assessment there is some erythema. Nurse was instructed to keep monitoring patient for any arrhythmia or other event to the medical team.  Wound care consult in place.

## 2024-05-05 NOTE — ASSESSMENT & PLAN NOTE
History of combined CHF with the most recent EF of 35%  Pt took off Life vest : reports she will wear at home.  Continue telemetry monitor.  GDMT includes Toprol XL, Entresto  Resume Toprol-XL, continue to hold Entresto since DARLING improving.  Monitor strict intake/output  Monitor weights

## 2024-05-05 NOTE — PLAN OF CARE
Problem: PAIN - ADULT  Goal: Verbalizes/displays adequate comfort level or baseline comfort level  Description: Interventions:  - Encourage patient to monitor pain and request assistance  - Assess pain using appropriate pain scale  - Administer analgesics based on type and severity of pain and evaluate response  - Implement non-pharmacological measures as appropriate and evaluate response  - Consider cultural and social influences on pain and pain management  - Notify physician/advanced practitioner if interventions unsuccessful or patient reports new pain  Outcome: Progressing     Problem: INFECTION - ADULT  Goal: Absence or prevention of progression during hospitalization  Description: INTERVENTIONS:  - Assess and monitor for signs and symptoms of infection  - Monitor lab/diagnostic results  - Monitor all insertion sites, i.e. indwelling lines, tubes, and drains  - Monitor endotracheal if appropriate and nasal secretions for changes in amount and color  - [place] appropriate cooling/warming therapies per order  - Administer medications as ordered  - Instruct and encourage patient and family to use good hand hygiene technique  - Identify and instruct in appropriate isolation precautions for identified infection/condition  Outcome: Progressing  Goal: Absence of fever/infection during neutropenic period  Description: INTERVENTIONS:  - Monitor WBC    Outcome: Progressing     Problem: DISCHARGE PLANNING  Goal: Discharge to home or other facility with appropriate resources  Description: INTERVENTIONS:  - Identify barriers to discharge w/patient and caregiver  - Arrange for needed discharge resources and transportation as appropriate  - Identify discharge learning needs (meds, wound care, etc.)  - Arrange for interpretive services to assist at discharge as needed  - Refer to Case Management Department for coordinating discharge planning if the patient needs post-hospital services based on physician/advanced  practitioner order or complex needs related to functional status, cognitive ability, or social support system  Outcome: Progressing     Problem: Knowledge Deficit  Goal: Patient/family/caregiver demonstrates understanding of disease process, treatment plan, medications, and discharge instructions  Description: Complete learning assessment and assess knowledge base.  Interventions:  - Provide teaching at level of understanding  - Provide teaching via preferred learning methods  Outcome: Progressing     Problem: Prexisting or High Potential for Compromised Skin Integrity  Goal: Skin integrity is maintained or improved  Description: INTERVENTIONS:  - Identify patients at risk for skin breakdown  - Assess and monitor skin integrity  - Assess and monitor nutrition and hydration status  - Monitor labs   - Assess for incontinence   - Turn and reposition patient  - Assist with mobility/ambulation  - Relieve pressure over bony prominences  - Avoid friction and shearing  - Provide appropriate hygiene as needed including keeping skin clean and dry  - Evaluate need for skin moisturizer/barrier cream  - Collaborate with interdisciplinary team   - Patient/family teaching  - Consider wound care consult   Outcome: Progressing     Problem: Nutrition/Hydration-ADULT  Goal: Nutrient/Hydration intake appropriate for improving, restoring or maintaining nutritional needs  Description: Monitor and assess patient's nutrition/hydration status for malnutrition. Collaborate with interdisciplinary team and initiate plan and interventions as ordered.  Monitor patient's weight and dietary intake as ordered or per policy. Utilize nutrition screening tool and intervene as necessary. Determine patient's food preferences and provide high-protein, high-caloric foods as appropriate.     INTERVENTIONS:  - Monitor oral intake, urinary output, labs, and treatment plans  - Assess nutrition and hydration status and recommend course of action  - Evaluate  amount of meals eaten  - Assist patient with eating if necessary   - Allow adequate time for meals  - Recommend/ encourage appropriate diets, oral nutritional supplements, and vitamin/mineral supplements  - Order, calculate, and assess calorie counts as needed  - Recommend, monitor, and adjust tube feedings and TPN/PPN based on assessed needs  - Assess need for intravenous fluids  - Provide specific nutrition/hydration education as appropriate  - Include patient/family/caregiver in decisions related to nutrition  Outcome: Progressing

## 2024-05-05 NOTE — ASSESSMENT & PLAN NOTE
Patient presented to the ER with lethargy and fevers for several days.  Met SIRS criteria on admission, evidenced by tachycardia, tachypnea, leukocytosis with an elevated procalcitonin  Patient also with septic shock criteria, evidenced by bandemia, DARLING with hypotension; requiring ICU admission for pressors  She was able to receive 2.5 L fluid bolus in the ER, but due to chronic combined HF with an EF of 35%, unable to give patient further fluid boluses.    Shock has now resolved.  Current hemodynamically stable.  Etiology of sepsis appears to be multifactorial - evidenced by pyelonephritis and pneumonia as seen on CT imaging.  MRSA Negative, strep pneumoniae/legionella negative.  Urine culture with gram - marta growth thus far.  Blood cultures x 2 + gram - rods, BCID e coli.  Procal downtrending.  DC iv cefepime and vanco  Currently febrile, hemodynamically stable.  Continue  IV ceftriaxone 2 g every 12 hours.  Follow-up with final culture sensitivity.  Overall guarded prognosis  Continue with supportive care.

## 2024-05-06 LAB
ANION GAP SERPL CALCULATED.3IONS-SCNC: 6 MMOL/L (ref 4–13)
BACTERIA UR CULT: ABNORMAL
BACTERIA UR CULT: ABNORMAL
BUN SERPL-MCNC: 49 MG/DL (ref 5–25)
CA-I BLD-SCNC: 1.2 MMOL/L (ref 1.12–1.32)
CALCIUM SERPL-MCNC: 8.8 MG/DL (ref 8.4–10.2)
CHLORIDE SERPL-SCNC: 106 MMOL/L (ref 96–108)
CO2 SERPL-SCNC: 28 MMOL/L (ref 21–32)
CREAT SERPL-MCNC: 1.55 MG/DL (ref 0.6–1.3)
ERYTHROCYTE [DISTWIDTH] IN BLOOD BY AUTOMATED COUNT: 15.6 % (ref 11.6–15.1)
GFR SERPL CREATININE-BSD FRML MDRD: 32 ML/MIN/1.73SQ M
GLUCOSE SERPL-MCNC: 102 MG/DL (ref 65–140)
HCT VFR BLD AUTO: 26.9 % (ref 34.8–46.1)
HGB BLD-MCNC: 8.3 G/DL (ref 11.5–15.4)
MAGNESIUM SERPL-MCNC: 2.1 MG/DL (ref 1.9–2.7)
MAGNESIUM SERPL-MCNC: 2.1 MG/DL (ref 1.9–2.7)
MCH RBC QN AUTO: 29.5 PG (ref 26.8–34.3)
MCHC RBC AUTO-ENTMCNC: 30.9 G/DL (ref 31.4–37.4)
MCV RBC AUTO: 96 FL (ref 82–98)
NRBC BLD AUTO-RTO: 0 /100 WBCS
PHOSPHATE SERPL-MCNC: 1.7 MG/DL (ref 2.3–4.1)
PLATELET # BLD AUTO: 100 THOUSANDS/UL (ref 149–390)
PMV BLD AUTO: 11.5 FL (ref 8.9–12.7)
POTASSIUM SERPL-SCNC: 3.7 MMOL/L (ref 3.5–5.3)
RBC # BLD AUTO: 2.81 MILLION/UL (ref 3.81–5.12)
SODIUM SERPL-SCNC: 140 MMOL/L (ref 135–147)
WBC # BLD AUTO: 8.38 THOUSAND/UL (ref 4.31–10.16)

## 2024-05-06 PROCEDURE — 83735 ASSAY OF MAGNESIUM: CPT | Performed by: NURSE PRACTITIONER

## 2024-05-06 PROCEDURE — 99233 SBSQ HOSP IP/OBS HIGH 50: CPT | Performed by: STUDENT IN AN ORGANIZED HEALTH CARE EDUCATION/TRAINING PROGRAM

## 2024-05-06 PROCEDURE — 92526 ORAL FUNCTION THERAPY: CPT

## 2024-05-06 PROCEDURE — 83735 ASSAY OF MAGNESIUM: CPT | Performed by: STUDENT IN AN ORGANIZED HEALTH CARE EDUCATION/TRAINING PROGRAM

## 2024-05-06 PROCEDURE — 80048 BASIC METABOLIC PNL TOTAL CA: CPT | Performed by: NURSE PRACTITIONER

## 2024-05-06 PROCEDURE — 84100 ASSAY OF PHOSPHORUS: CPT | Performed by: NURSE PRACTITIONER

## 2024-05-06 PROCEDURE — 94664 DEMO&/EVAL PT USE INHALER: CPT

## 2024-05-06 PROCEDURE — 94760 N-INVAS EAR/PLS OXIMETRY 1: CPT

## 2024-05-06 PROCEDURE — 82330 ASSAY OF CALCIUM: CPT | Performed by: NURSE PRACTITIONER

## 2024-05-06 PROCEDURE — 85027 COMPLETE CBC AUTOMATED: CPT | Performed by: NURSE PRACTITIONER

## 2024-05-06 PROCEDURE — 94640 AIRWAY INHALATION TREATMENT: CPT

## 2024-05-06 RX ORDER — MAGNESIUM SULFATE HEPTAHYDRATE 40 MG/ML
2 INJECTION, SOLUTION INTRAVENOUS ONCE
Status: DISCONTINUED | OUTPATIENT
Start: 2024-05-06 | End: 2024-05-06

## 2024-05-06 RX ORDER — ACETAMINOPHEN 325 MG/1
650 TABLET ORAL EVERY 6 HOURS PRN
Status: DISCONTINUED | OUTPATIENT
Start: 2024-05-06 | End: 2024-05-15 | Stop reason: HOSPADM

## 2024-05-06 RX ORDER — AMLODIPINE BESYLATE 10 MG/1
10 TABLET ORAL DAILY
Status: DISCONTINUED | OUTPATIENT
Start: 2024-05-06 | End: 2024-05-15 | Stop reason: HOSPADM

## 2024-05-06 RX ORDER — AMOXICILLIN 250 MG/1
500 CAPSULE ORAL EVERY 12 HOURS SCHEDULED
Status: DISCONTINUED | OUTPATIENT
Start: 2024-05-06 | End: 2024-05-07

## 2024-05-06 RX ADMIN — BUPROPION HYDROCHLORIDE 75 MG: 75 TABLET, FILM COATED ORAL at 09:35

## 2024-05-06 RX ADMIN — ASPIRIN 81 MG: 81 TABLET, COATED ORAL at 09:35

## 2024-05-06 RX ADMIN — SODIUM PHOSPHATE, MONOBASIC, MONOHYDRATE AND SODIUM PHOSPHATE, DIBASIC, ANHYDROUS 6 MMOL: 142; 276 INJECTION, SOLUTION INTRAVENOUS at 10:53

## 2024-05-06 RX ADMIN — IPRATROPIUM BROMIDE 0.5 MG: 0.5 SOLUTION RESPIRATORY (INHALATION) at 07:52

## 2024-05-06 RX ADMIN — PANTOPRAZOLE SODIUM 40 MG: 40 TABLET, DELAYED RELEASE ORAL at 09:35

## 2024-05-06 RX ADMIN — Medication 4.5 MG: at 21:36

## 2024-05-06 RX ADMIN — IPRATROPIUM BROMIDE 0.5 MG: 0.5 SOLUTION RESPIRATORY (INHALATION) at 20:47

## 2024-05-06 RX ADMIN — GABAPENTIN 100 MG: 100 CAPSULE ORAL at 21:36

## 2024-05-06 RX ADMIN — AMOXICILLIN 500 MG: 250 CAPSULE ORAL at 09:35

## 2024-05-06 RX ADMIN — HEPARIN SODIUM 5000 UNITS: 5000 INJECTION INTRAVENOUS; SUBCUTANEOUS at 21:36

## 2024-05-06 RX ADMIN — AMLODIPINE BESYLATE 10 MG: 10 TABLET ORAL at 17:39

## 2024-05-06 RX ADMIN — AMOXICILLIN 500 MG: 250 CAPSULE ORAL at 20:41

## 2024-05-06 RX ADMIN — HEPARIN SODIUM 5000 UNITS: 5000 INJECTION INTRAVENOUS; SUBCUTANEOUS at 14:51

## 2024-05-06 RX ADMIN — METOPROLOL SUCCINATE 100 MG: 100 TABLET, EXTENDED RELEASE ORAL at 09:43

## 2024-05-06 RX ADMIN — CEFTRIAXONE SODIUM 2000 MG: 10 INJECTION, POWDER, FOR SOLUTION INTRAVENOUS at 00:01

## 2024-05-06 RX ADMIN — ACETAMINOPHEN 650 MG: 325 TABLET, FILM COATED ORAL at 06:03

## 2024-05-06 RX ADMIN — ACETAMINOPHEN 650 MG: 325 TABLET, FILM COATED ORAL at 20:41

## 2024-05-06 RX ADMIN — IPRATROPIUM BROMIDE 0.5 MG: 0.5 SOLUTION RESPIRATORY (INHALATION) at 13:56

## 2024-05-06 RX ADMIN — HEPARIN SODIUM 5000 UNITS: 5000 INJECTION INTRAVENOUS; SUBCUTANEOUS at 05:19

## 2024-05-06 NOTE — ASSESSMENT & PLAN NOTE
Patient does have history of hypertension, takes Toprol XL/Entresto at home.  She was noted to have hypotension on admission, secondary to septic shock, therefore these medications are currently on hold.    Shock is now resolved.  BP stable  Restart Toprol XL  Hold Entresto for now due to acute kidney injury improving  Monitor vital signs closely   None

## 2024-05-06 NOTE — DISCHARGE INSTR - AVS FIRST PAGE
Recommend outpatient follow-up with primary care provider to have repeat CT chest in 6 to 8 weeks to monitor evaluation.

## 2024-05-06 NOTE — ASSESSMENT & PLAN NOTE
Present on admission, evidenced by a creatinine of 2.99 with a baseline of 0.7-1.1  Creatinine continues to trend down.  Etiology likely multifactorial, secondary to poor oral intake as well as ATN from septic shock/hypotension.  Continues to improve, 1.55  Monitor BMP daily  Continue to hold Entresto for now.  Avoid nephrotoxins, hypotension.

## 2024-05-06 NOTE — ASSESSMENT & PLAN NOTE
Chronically wears 4 L NC at baseline  Currently O2 saturation 95 to 97% on 4 L nasal cannula.  Recent history of COVID-19 infection in  02/2024  CT chest Persistent multifocal consolidations may represent sequela of prior pneumonia. However, recurrent pneumonia or chronic infectious or inflammatory process is not excluded   Completed 4 days of IV ceftriaxone.  Transition to p.o. amoxicillin for total 10-day course for E. coli bacteremia.  Recommend outpatient follow-up with primary care provider to repeat CT chest imaging to monitor resolution.  Continue to trend CBC, procalcitonin.

## 2024-05-06 NOTE — ASSESSMENT & PLAN NOTE
Noted history  EGD in 2023 Mild erosive gastritis and Duodenal bulb angiectasia status post APC  Colonoscopy in  2023 Left-sided diverticulosis with old blood clots likely indicating a diverticular bleed   Had Normal out pt video capsule endoscopy.   Currently hemoglobin stable around 8 range.  Continue to trend CBC while inpt.

## 2024-05-06 NOTE — PLAN OF CARE
Problem: PAIN - ADULT  Goal: Verbalizes/displays adequate comfort level or baseline comfort level  Description: Interventions:  - Encourage patient to monitor pain and request assistance  - Assess pain using appropriate pain scale  - Administer analgesics based on type and severity of pain and evaluate response  - Implement non-pharmacological measures as appropriate and evaluate response  - Consider cultural and social influences on pain and pain management  - Notify physician/advanced practitioner if interventions unsuccessful or patient reports new pain  Outcome: Progressing     Problem: INFECTION - ADULT  Goal: Absence or prevention of progression during hospitalization  Description: INTERVENTIONS:  - Assess and monitor for signs and symptoms of infection  - Monitor lab/diagnostic results  - Monitor all insertion sites, i.e. indwelling lines, tubes, and drains  - Monitor endotracheal if appropriate and nasal secretions for changes in amount and color  - [place] appropriate cooling/warming therapies per order  - Administer medications as ordered  - Instruct and encourage patient and family to use good hand hygiene technique  - Identify and instruct in appropriate isolation precautions for identified infection/condition  Outcome: Progressing  Goal: Absence of fever/infection during neutropenic period  Description: INTERVENTIONS:  - Monitor WBC    Outcome: Progressing     Problem: DISCHARGE PLANNING  Goal: Discharge to home or other facility with appropriate resources  Description: INTERVENTIONS:  - Identify barriers to discharge w/patient and caregiver  - Arrange for needed discharge resources and transportation as appropriate  - Identify discharge learning needs (meds, wound care, etc.)  - Arrange for interpretive services to assist at discharge as needed  - Refer to Case Management Department for coordinating discharge planning if the patient needs post-hospital services based on physician/advanced  practitioner order or complex needs related to functional status, cognitive ability, or social support system  Outcome: Progressing     Problem: Knowledge Deficit  Goal: Patient/family/caregiver demonstrates understanding of disease process, treatment plan, medications, and discharge instructions  Description: Complete learning assessment and assess knowledge base.  Interventions:  - Provide teaching at level of understanding  - Provide teaching via preferred learning methods  Outcome: Progressing     Problem: Prexisting or High Potential for Compromised Skin Integrity  Goal: Skin integrity is maintained or improved  Description: INTERVENTIONS:  - Identify patients at risk for skin breakdown  - Assess and monitor skin integrity  - Assess and monitor nutrition and hydration status  - Monitor labs   - Assess for incontinence   - Turn and reposition patient  - Assist with mobility/ambulation  - Relieve pressure over bony prominences  - Avoid friction and shearing  - Provide appropriate hygiene as needed including keeping skin clean and dry  - Evaluate need for skin moisturizer/barrier cream  - Collaborate with interdisciplinary team   - Patient/family teaching  - Consider wound care consult   Outcome: Progressing     Problem: Nutrition/Hydration-ADULT  Goal: Nutrient/Hydration intake appropriate for improving, restoring or maintaining nutritional needs  Description: Monitor and assess patient's nutrition/hydration status for malnutrition. Collaborate with interdisciplinary team and initiate plan and interventions as ordered.  Monitor patient's weight and dietary intake as ordered or per policy. Utilize nutrition screening tool and intervene as necessary. Determine patient's food preferences and provide high-protein, high-caloric foods as appropriate.     INTERVENTIONS:  - Monitor oral intake, urinary output, labs, and treatment plans  - Assess nutrition and hydration status and recommend course of action  - Evaluate  amount of meals eaten  - Assist patient with eating if necessary   - Allow adequate time for meals  - Recommend/ encourage appropriate diets, oral nutritional supplements, and vitamin/mineral supplements  - Order, calculate, and assess calorie counts as needed  - Recommend, monitor, and adjust tube feedings and TPN/PPN based on assessed needs  - Assess need for intravenous fluids  - Provide specific nutrition/hydration education as appropriate  - Include patient/family/caregiver in decisions related to nutrition  Outcome: Progressing

## 2024-05-06 NOTE — RESPIRATORY THERAPY NOTE
RT Protocol Note  Lauren Quintero 78 y.o. female MRN: [id number]  Unit/Bed#: ICU 11 Encounter: [id number]    Assessment    Principal Problem:    Septic shock (HCC)  Active Problems:    Ambulatory dysfunction    Essential hypertension    Acute on chronic respiratory failure with hypoxia (HCC)    Coronary artery disease of native artery of native heart with stable angina pectoris ([facility])    Major depressive disorder, recurrent episode, moderate (HCC)    COPD (chronic obstructive pulmonary disease) ([facility])    Anemia    Chronic combined systolic and diastolic CHF (congestive heart failure) ([facility])    Acute kidney injury superimposed on chronic kidney disease  ([facility])      Home Pulmonary Medications:    Home Devices/Therapy: Home O2    Past Medical History:   Diagnosis Date    Acute congestive heart failure ([facility]) 2021    Anxiety     Cardiac disease     Chest pain 2021    Chronic pain disorder     COPD (chronic obstructive pulmonary disease) ([facility])     COVID-19 02/15/2024    Depression     Heart disease     Hyperlipidemia     Hypertension     MI (myocardial infarction) ([facility])     MRSA (methicillin resistant Staphylococcus aureus)     Renal disorder     benign kidney tumor     Social History     Socioeconomic History    Marital status:      Spouse name: None    Number of children: 3    Years of education: 13    Highest education level: High school graduate   Occupational History    Occupation: [employer]     Employer: [employer]     Comment: retired    Tobacco Use    Smoking status: Former     Current packs/day: 0.00     Average packs/day: 1 pack/day for 60.0 years (60.0 ttl pk-yrs)     Types: Cigarettes     Start date:      Quit date: 2016     Years since quittin.3    Smokeless tobacco: Never    Tobacco comments:     She has close to a 60 pack-year smoking history, most recently has cut down to 4-5 cigarettes daily   Vaping Use    Vaping status: Never Used   Substance and Sexual  Activity    Alcohol use: Never    Drug use: No    Sexual activity: Not Currently   Other Topics Concern    None   Social History Narrative    None     Social Determinants of Health     Financial Resource Strain: Patient Declined (1/6/2024)    Received from [facility]    Overall Financial Resource Strain (CARDIA)     Difficulty of Paying Living Expenses: Patient declined   Food Insecurity: No Food Insecurity (5/3/2024)    Hunger Vital Sign     Worried About Running Out of Food in the Last Year: Never true     Ran Out of Food in the Last Year: Never true   Transportation Needs: No Transportation Needs (5/3/2024)    PRAPARE - Transportation     Lack of Transportation (Medical): No     Lack of Transportation (Non-Medical): No   Physical Activity: Patient Declined (1/6/2024)    Received from [facility]    Exercise Vital Sign     Days of Exercise per Week: Patient declined     Minutes of Exercise per Session: Patient declined   Stress: Patient Declined (1/6/2024)    Received from [facility]    Citizen of [place] [employer] - Occupational Stress Questionnaire     Feeling of Stress : Patient declined   Social Connections: Patient Declined (1/6/2024)    Received from [facility]    Social Connection and Isolation Panel [NHANES]     Frequency of Communication with Friends and Family: Patient declined     Frequency of Social Gatherings with Friends and Family: Patient declined     Attends Sikhism Services: Patient declined     Active Member of Clubs or Organizations: Patient declined     Attends Club or Organization Meetings: Patient declined     Marital Status: Patient declined   Intimate Partner Violence: Patient Declined (1/6/2024)    Received from [facility]    Humiliation, Afraid, Rape, and Kick questionnaire     Fear of Current or Ex-Partner: Patient declined     Emotionally Abused: Patient declined     Physically Abused: Patient declined     Sexually Abused: Patient declined  "  Housing Stability: Low Risk  (5/3/2024)    Housing Stability Vital Sign     Unable to Pay for Housing in the Last Year: No     Number of Places Lived in the Last Year: 1     Unstable Housing in the Last Year: No       Subjective         Objective    Physical Exam:   Assessment Type: During-treatment  General Appearance: Eyes open/responds to voice, Sleeping  Respiratory Pattern: Normal  Chest Assessment: Chest expansion symmetrical  Bilateral Breath Sounds: Diminished  Cough: None  O2 Device: NC    Vitals:  Blood pressure 156/78, pulse (P) 93, temperature 99.8 °F (37.7 °C), temperature source Axillary, resp. rate (P) 15, height 5' 7\" (1.702 m), weight 52.9 kg (116 lb 10 oz), SpO2 100%, not currently breastfeeding.          Imaging and other studies: I have personally reviewed pertinent reports.      O2 Device: NC     Plan    Respiratory Plan: Mild Distress pathway        Resp Comments: Pt resting and in no apparent distress.  Sleeping, but eyes open when name called.  Will cont w/ current therapy.   "

## 2024-05-06 NOTE — CASE MANAGEMENT
Case Management Discharge Planning Note    Patient name Lauren Quintero  Location ICU 11/ICU 11 MRN [id number]  : 1945 Date 2024       Current Admission Date: 2024  Current Admission Diagnosis:Septic shock ([facility])   Patient Active Problem List    Diagnosis Date Noted    Acute kidney injury superimposed on chronic kidney disease  ([facility]) 2024    Septic shock ([facility]) 2024    DARLING (acute kidney injury) ([facility]) 2024    Diarrhea 2024    Pain 04/15/2024    Loose stools 2024    Insomnia 2024    Non-ST elevation MI (NSTEMI) ([facility]) 2024    Abnormal chest x-ray 2024    Chronic combined systolic and diastolic CHF (congestive heart failure) ([facility]) 2024    Elevated troponin 2024    CKD (chronic kidney disease) 2024    Dizziness 2023    Social problem 2023    Diverticulitis 10/29/2023    Moderate protein-calorie malnutrition ([facility]) 2023    BRBPR (bright red blood per rectum) 2023    Generalized weakness 2023    Staring episodes 2023    Waldenstrom macroglobulinemia ([facility]) 2023    Severe protein-calorie malnutrition ([facility]) 2021    Positive blood culture 2021    COPD (chronic obstructive pulmonary disease) ([facility]) 2021    Anemia 2021    Abnormal computed tomography angiography (CTA) 2021    Fatigue 2021    Major depressive disorder, recurrent episode, moderate ([facility]) 2019    Flank pain 2019    Coronary artery disease of native artery of native heart with stable angina pectoris ([facility]) 2018    Acute on chronic respiratory failure with hypoxia ([facility]) 2018    Ambulatory dysfunction 2018    Essential hypertension 2018    Tobacco abuse 2018      LOS (days): 4  Geometric Mean LOS (GMLOS) (days): 2.6  Days to GMLOS:-1.2     OBJECTIVE:  Risk of Unplanned Readmission Score: 44.02         Current admission status: Inpatient   Preferred Pharmacy:   [id number] [place] - [address]  [address]  [place] [address]  Phone: [phone] Fax: [phone]    [facility] [place] - [address]  [address]  [address]  [place] [address]  Phone: [phone] Fax: [phone]    [place] [address] [facility]  [address] [facility] [address]  Phone: [phone] Fax: [phone]    [facility] [place] - [address]  [address] [facility] [place] [address]  Phone: [phone] Fax: [phone]    Primary Care Provider: Starla Bateman MD    Primary Insurance: MEDICARE  Secondary Insurance:     DISCHARGE DETAILS:                                          Other Referral/Resources/Interventions Provided:  Referral Comments: [facility] offering a bed via [facility];  facility reserved.  Pt on O2 4L, IV abx, IV NaPO4.  Has Lifevest in room;  unsure if pt has been using it at home as ordered.  D/C anticipated in 24-48 hrs per SLIM rounds this AM.         Treatment Team Recommendation: Short Term Rehab, SNF, Home with Home Health Care  Discharge Destination Plan:: SNF, Short Term Rehab  Transport at Discharge : Other (Comment) (TBD)

## 2024-05-06 NOTE — PROGRESS NOTES
[facility]  Progress Note  Name: Lauren Quintero I  MRN: [id number]  Unit/Bed#: ICU 11 I Date of Admission: 5/2/2024   Date of Service: 5/6/2024 I Hospital Day: 4    Assessment/Plan   * Septic shock (HCC)  Assessment & Plan  Patient presented to the ER with lethargy and fevers for several days.  Met SIRS criteria on admission, evidenced by tachycardia, tachypnea, leukocytosis with an elevated procalcitonin  Patient also with septic shock criteria, evidenced by bandemia, DARLING with hypotension; requiring ICU admission for pressors  She was able to receive 2.5 L fluid bolus in the ER, but due to chronic combined HF with an EF of 35%, unable to give patient further fluid boluses.  2/2 blood cultures growing pansensitive E. coli.    Shock has now resolved.  Sepsis likely secondary to pyelonephritis.  Current hemodynamically stable.  Etiology of sepsis appears to be multifactorial - evidenced by pyelonephritis and pneumonia as seen on CT imaging.  MRSA Negative, strep pneumoniae/legionella negative.  Initially was given cefepime and vancomycin.  Subsequently completed 4 days of IV ceftriaxone.  Transitioned to p.o. amoxicillin 500 mg twice daily given currently rates less than 30: Increase p.o. amoxicillin to 1000 mg twice daily once creatinine clearance improves more than 30.  Antibiotic course through 5/11/2024 for total 10-day course  Follow-up with final culture sensitivity.  Overall guarded prognosis  Continue with supportive care.      Acute kidney injury superimposed on chronic kidney disease  (HCC)  Assessment & Plan  Present on admission, evidenced by a creatinine of 2.99 with a baseline of 0.7-1.1  Creatinine continues to trend down.  Etiology likely multifactorial, secondary to poor oral intake as well as ATN from septic shock/hypotension.  Continues to improve, 1.55  Monitor BMP daily  Continue to hold Entresto for now.  Avoid nephrotoxins, hypotension.    Chronic combined systolic and  diastolic CHF (congestive heart failure) ([facility])  Assessment & Plan  History of combined CHF with the most recent EF of 35%  Pt took off Life vest : reports she will wear at home.  Continue telemetry monitor.  GDMT includes Toprol XL, Entresto  Resume Toprol-XL, continue to hold Entresto since DARLING improving.  Monitor strict intake/output  Monitor weights    Anemia  Assessment & Plan  Noted history  EGD in 2023 Mild erosive gastritis and Duodenal bulb angiectasia status post APC  Colonoscopy in  2023 Left-sided diverticulosis with old blood clots likely indicating a diverticular bleed   Had Normal out pt video capsule endoscopy.   Currently hemoglobin stable around 8 range.  Continue to trend CBC while inpt.       COPD (chronic obstructive pulmonary disease) ([facility])  Assessment & Plan  Noted history; + smoker  Follows with Pulmonology OP  Chonically on 4L nc at home.  Takes Advair at home.  Continue nebs.  Monitor respiratory status closely    Major depressive disorder, recurrent episode, moderate ([facility])  Assessment & Plan  Takes reports patient taking multiple agents for depression  Wellbutrin, Vrylar, and Viibryd  Currently receiving Wellbutrin, other medications are non-formulary.      Coronary artery disease of native artery of native heart with stable angina pectoris ([facility])  Assessment & Plan  Continue with home medications - Aspirin, pravastatin, metoprolol  History of Multivessel Disease as noted in 2/24; declined CABG.      Acute on chronic respiratory failure with hypoxia ([facility])  Assessment & Plan  Chronically wears 4 L NC at baseline  Currently O2 saturation 95 to 97% on 4 L nasal cannula.  Recent history of COVID-19 infection in  02/2024  CT chest Persistent multifocal consolidations may represent sequela of prior pneumonia. However, recurrent pneumonia or chronic infectious or inflammatory process is not excluded   Completed 4 days of IV ceftriaxone.  Transition to p.o. amoxicillin for total 10-day course for E.  coli bacteremia.  Recommend outpatient follow-up with primary care provider to repeat CT chest imaging to monitor resolution.  Continue to trend CBC, procalcitonin.    Essential hypertension  Assessment & Plan  Patient does have history of hypertension, takes Toprol XL/Entresto at home.  She was noted to have hypotension on admission, secondary to septic shock, therefore these medications are currently on hold.    Shock is now resolved.  BP stable  Restart Toprol XL  Hold Entresto for now due to acute kidney injury improving  Monitor vital signs closely    Ambulatory dysfunction  Assessment & Plan  Uses a walker for assistance at baseline  PT/OT following                 VTE Pharmacologic Prophylaxis:   Moderate Risk (Score 3-4) - Pharmacological DVT Prophylaxis Ordered: heparin.    Mobility:   Basic Mobility Inpatient Raw Score: 15  -[facility] Goal: 4: Move to chair/commode  -[facility] Achieved: 6: Walk 10 steps or more      Patient Centered Rounds: I performed bedside rounds with nursing staff today.       Education and Discussions with Family / Patient: Updated  (son) via phone.     Total Time Spent on Date of Encounter in care of patient: 25 mins. This time was spent on one or more of the following: performing physical exam; counseling and coordination of care; obtaining or reviewing history; documenting in the medical record; reviewing/ordering tests, medications or procedures; communicating with other healthcare professionals and discussing with patient's family/caregivers.    Current Length of Stay: 4 day(s)  Current Patient Status: Inpatient   Certification Statement: The patient will continue to require additional inpatient hospital stay due to transitioning to PO abx and DARLING improving.   Discharge Plan: Anticipate discharge in 48 hrs to discharge location to be determined pending rehab evaluations. Likely inpt rehab. Son prefers [facility]. Son has total knee replacement so pt would benefit form  inpt rehab.      Code Status: Level 1 - Full Code    Subjective:   Seen during a.m. rounds.  Patient appears comfortable nondistressed.  Patient was given low-dose of Seroquel yesterday due to mild delirium however noted somnolent during a.m. rounds which improved later on in the afternoon.  She is oriented x 3 during my encounter.  Denies chest pain, dyspnea, fever, chills, nausea, vomiting, any other new complaints.  No other events reported.  Will discontinue Seroquel.        Objective:     Vitals:   Temp (24hrs), Av.3 °F (36.8 °C), Min:97.6 °F (36.4 °C), Max:99.8 °F (37.7 °C)    Temp:  [97.6 °F (36.4 °C)-99.8 °F (37.7 °C)] 98.1 °F (36.7 °C)  HR:  [] 93  Resp:  [14-36] 18  BP: (138-170)/(68-84) 138/68  SpO2:  [94 %-100 %] 94 %  Body mass index is 18.27 kg/m².     Input and Output Summary (last 24 hours):     Intake/Output Summary (Last 24 hours) at 2024 1314  Last data filed at 2024 1740  Gross per 24 hour   Intake 50 ml   Output 200 ml   Net -150 ml       Physical Exam:   Physical Exam  Constitutional:       General: She is not in acute distress.     Appearance: She is not ill-appearing, toxic-appearing or diaphoretic.      Comments: Elderly female patient, chronically ill/deconditioning appearing, acutely nontoxic appearing.     HENT:      Head: Normocephalic and atraumatic.   Eyes:      Pupils: Pupils are equal, round, and reactive to light.   Cardiovascular:      Rate and Rhythm: Normal rate.      Pulses: Normal pulses.   Pulmonary:      Effort: Pulmonary effort is normal. No respiratory distress.      Breath sounds: No rales.      Comments: O2 saturation 95 to 97% on 2-3L nasal cannula.  Abdominal:      General: Bowel sounds are normal. There is no distension.      Palpations: Abdomen is soft.      Tenderness: There is no abdominal tenderness.   Musculoskeletal:      Right lower leg: No edema.      Left lower leg: No edema.   Neurological:      Mental Status: She is alert and oriented to  person, place, and time. Mental status is at baseline.   Psychiatric:         Mood and Affect: Mood normal.         Behavior: Behavior normal.          Additional Data:     Labs:  Results from last 7 days   Lab Units 05/06/24  0519 05/05/24  0549 05/04/24 0452 05/03/24  0550   WBC Thousand/uL 8.38 12.84*   < > 18.40*   HEMOGLOBIN g/dL 8.3* 9.6*   < > 8.0*   HEMATOCRIT % 26.9* 30.9*   < > 25.8*   PLATELETS Thousands/uL 100* 129*   < > 120*   BANDS PCT %  --   --   --  8   SEGS PCT %  --  88*  --   --    LYMPHO PCT %  --  4*  --  6*   MONO PCT %  --  6  --  1*   EOS PCT %  --  0  --  0    < > = values in this interval not displayed.     Results from last 7 days   Lab Units 05/06/24 0519 05/04/24 0452 05/03/24  0550   SODIUM mmol/L 140   < > 140   POTASSIUM mmol/L 3.7   < > 4.4   CHLORIDE mmol/L 106   < > 109*   CO2 mmol/L 28   < > 24   BUN mg/dL 49*   < > 55*   CREATININE mg/dL 1.55*   < > 2.59*   ANION GAP mmol/L 6   < > 7   CALCIUM mg/dL 8.8   < > 7.8*   ALBUMIN g/dL  --   --  2.8*   TOTAL BILIRUBIN mg/dL  --   --  0.39   ALK PHOS U/L  --   --  63   ALT U/L  --   --  3*   AST U/L  --   --  13   GLUCOSE RANDOM mg/dL 102   < > 139    < > = values in this interval not displayed.     Results from last 7 days   Lab Units 05/03/24  0550   INR  1.31*     Results from last 7 days   Lab Units 05/05/24  1521 05/02/24  2301 05/02/24  1909   POC GLUCOSE mg/dl 128 171* 146*         Results from last 7 days   Lab Units 05/05/24  0549 05/04/24  0452 05/03/24  0550 05/02/24  1924   LACTIC ACID mmol/L  --   --   --  1.2   PROCALCITONIN ng/ml 38.59* 79.62* 141.27* 225.38*       Lines/Drains:  Invasive Devices       Peripheral Intravenous Line  Duration             Peripheral IV 05/05/24 Left Antecubital 1 day                      Telemetry:  Telemetry Orders (From admission, onward)               LifeVest Patient: Continuous Telemetry Monitoring during hospitalization (non-expiring)  Continuous LifeVest Telemetry Monitoring         References:    LifeVest Policy        LifeVest Patient: Continuous Telemetry Monitoring during hospitalization (non-expiring)  (Lifevest Evaluation for removal/continue)  Continuous LifeVest Telemetry Monitoring        References:    LifeVest Policy                     Telemetry Reviewed: Normal Sinus Rhythm  Indication for Continued Telemetry Use: Lifevest (remains on tele entire hospital stay)             Recent Cultures (last 7 days):   Results from last 7 days   Lab Units 05/03/24  0005 05/02/24 1944 05/02/24 1925 05/02/24 1924   BLOOD CULTURE   --   --  Escherichia coli* Escherichia coli*   GRAM STAIN RESULT   --   --  Gram negative rods* Gram negative rods*   URINE CULTURE   --  >100,000 cfu/ml Escherichia coli*  50,000-59,000 cfu/ml Beta Hemolytic Streptococcus Group B*  --   --    LEGIONELLA URINARY ANTIGEN  Negative  --   --   --        Last 24 Hours Medication List:   Current Facility-Administered Medications   Medication Dose Route Frequency Provider Last Rate    acetaminophen  650 mg Oral Q6H PRN TAMMIE Nunez      albuterol  2.5 mg Nebulization Q4H PRN TAMMIE Pathak      amoxicillin  500 mg Oral Q12H ROSELINE Domenic YAÑEZ MD      aspirin  81 mg Oral Daily TAMMIE Pathak      buPROPion  75 mg Oral Daily TAMMIE Pathak      gabapentin  100 mg Oral HS Domenic YAÑEZ MD      heparin (porcine)  5,000 Units Subcutaneous Q8H ROSELINE TAMMIE Pathak      ipratropium  0.5 mg Nebulization TID TAMMIE Pathak      melatonin  4.5 mg Oral HS Domenic YAÑEZ MD      metoprolol succinate  100 mg Oral Daily Domenic YAÑEZ MD      pantoprazole  40 mg Oral Daily Domenic YAÑEZ MD      sodium phosphate  6 mmol Intravenous Once Domenic YAÑEZ MD 6 mmol (05/06/24 1053)        Today, Patient Was Seen By: Domenic Wylie MD    **Please Note: This note may have been constructed using a voice recognition  system.**

## 2024-05-06 NOTE — ASSESSMENT & PLAN NOTE
Patient presented to the ER with lethargy and fevers for several days.  Met SIRS criteria on admission, evidenced by tachycardia, tachypnea, leukocytosis with an elevated procalcitonin  Patient also with septic shock criteria, evidenced by bandemia, DARLING with hypotension; requiring ICU admission for pressors  She was able to receive 2.5 L fluid bolus in the ER, but due to chronic combined HF with an EF of 35%, unable to give patient further fluid boluses.  2/2 blood cultures growing pansensitive E. coli.    Shock has now resolved.  Sepsis likely secondary to pyelonephritis.  Current hemodynamically stable.  Etiology of sepsis appears to be multifactorial - evidenced by pyelonephritis and pneumonia as seen on CT imaging.  MRSA Negative, strep pneumoniae/legionella negative.  Initially was given cefepime and vancomycin.  Subsequently completed 4 days of IV ceftriaxone.  Transitioned to p.o. amoxicillin 500 mg twice daily given currently rates less than 30: Increase p.o. amoxicillin to 1000 mg twice daily once creatinine clearance improves more than 30.  Antibiotic course through 5/11/2024 for total 10-day course  Follow-up with final culture sensitivity.  Overall guarded prognosis  Continue with supportive care.

## 2024-05-06 NOTE — ASSESSMENT & PLAN NOTE
Noted history; + smoker  Follows with Pulmonology OP  Chonically on 4L nc at home.  Takes Advair at home.  Continue nebs.  Monitor respiratory status closely

## 2024-05-06 NOTE — WOUND OSTOMY CARE
Progress Note - Wound   Lauren Quintero 78 y.o. female MRN: [id number]  Unit/Bed#: ICU 11 Encounter: [id number]      Assessment:   Patient admitted to [facility] due to septic shock. History of COPD, HLD, HTN, MI. Wound care consulted for erythema beneath breast. Patient agreeable to assessment, is alert and oriented x4, continent of bowel, incontinent of bladder, pure-wick in place for urine management, assist of 1-2 to turn for assessment, is an assist with care. Primary RN made aware of assessment.    1. Bilateral elbows, hips, sacrum, buttock, and heels- skin is dry, intact, blanchable.    2. Left breast and left hip noted with irregular areas of purple ecchymosis that were pictured for patient's chart, no open aspects and no pressure injury noted to these areas.     Educated patient on importance of frequent offloading of pressure via turning, repositioning, and weight-shifting. Verbalized understanding of plan of care.    Wound care will sign off at this time, please re-consult if needed. Please follow skin care recommendations written as orders for skin protection and prevention.    Skin care plans:  1-Hydraguard to bilateral sacrum, buttock and heels BID and PRN  2-Elevate heels to offload pressure.  3-Ehob cushion in chair when out of bed.  4-Moisturize skin daily with skin nourishing cream.  5-Turn/reposition q2h for pressure re-distribution on skin.         Wound Care will sign off  Call or Tigertext with any questions    Maine CASTRO RN CWON  Wound and Ostomy care

## 2024-05-06 NOTE — SPEECH THERAPY NOTE
Speech Language/Pathology     Speech/Language Pathology Progress Note     Patient Name: Lauren Quintero    Today's Date: 5/6/2024     Problem List  Principal Problem:    Septic shock (Formerly [facility])  Active Problems:    Ambulatory dysfunction    Essential hypertension    Acute on chronic respiratory failure with hypoxia (Formerly [facility])    Coronary artery disease of native artery of native heart with stable angina pectoris (Formerly [facility])    Major depressive disorder, recurrent episode, moderate (HCC)    COPD (chronic obstructive pulmonary disease) (Formerly [facility])    Anemia    Chronic combined systolic and diastolic CHF (congestive heart failure) (Formerly [facility])    Acute kidney injury superimposed on chronic kidney disease  (Formerly [facility])       Subjective:  Patient received awake, upright in bed     Previous/current diet: dys3/thin     Objective:  The following consistencies were tested regular solids, softened solid, thin liquid.   Patient presents with functional bolus containment, manipulation and control.  Mastication judged to be prolonged w/ increased WOB upon progression of regular solid trials. Transfer prolonged.  Clears oral cavity entirely.  No overt s/s of aspiration or distress.      Assessment:  Oropharyngeal swallow function appears same; recommend continue present diet.       Plan:  Dysphagia 3/thin  Meds as tolerated  Will continue to follow x1-3      Corine Gastelum MS, CCC-SLP  Speech-Language Pathologist  [place] #[id number]  [place] #[id number]

## 2024-05-06 NOTE — ASSESSMENT & PLAN NOTE
Takes reports patient taking multiple agents for depression  Wellbutrin, Vrylar, and Viibryd  Currently receiving Wellbutrin, other medications are non-formulary.

## 2024-05-07 ENCOUNTER — APPOINTMENT (INPATIENT)
Dept: RADIOLOGY | Facility: HOSPITAL | Age: 79
DRG: 871 | End: 2024-05-07
Payer: MEDICARE

## 2024-05-07 LAB
ANION GAP SERPL CALCULATED.3IONS-SCNC: 4 MMOL/L (ref 4–13)
BUN SERPL-MCNC: 40 MG/DL (ref 5–25)
CALCIUM SERPL-MCNC: 8.6 MG/DL (ref 8.4–10.2)
CHLORIDE SERPL-SCNC: 106 MMOL/L (ref 96–108)
CO2 SERPL-SCNC: 29 MMOL/L (ref 21–32)
CREAT SERPL-MCNC: 1.32 MG/DL (ref 0.6–1.3)
ERYTHROCYTE [DISTWIDTH] IN BLOOD BY AUTOMATED COUNT: 15.5 % (ref 11.6–15.1)
GFR SERPL CREATININE-BSD FRML MDRD: 38 ML/MIN/1.73SQ M
GLUCOSE SERPL-MCNC: 110 MG/DL (ref 65–140)
HCT VFR BLD AUTO: 26.6 % (ref 34.8–46.1)
HGB BLD-MCNC: 8.4 G/DL (ref 11.5–15.4)
MCH RBC QN AUTO: 29.9 PG (ref 26.8–34.3)
MCHC RBC AUTO-ENTMCNC: 31.6 G/DL (ref 31.4–37.4)
MCV RBC AUTO: 95 FL (ref 82–98)
PHOSPHATE SERPL-MCNC: 2.2 MG/DL (ref 2.3–4.1)
PLATELET # BLD AUTO: 127 THOUSANDS/UL (ref 149–390)
PMV BLD AUTO: 10.8 FL (ref 8.9–12.7)
POTASSIUM SERPL-SCNC: 3.5 MMOL/L (ref 3.5–5.3)
PROCALCITONIN SERPL-MCNC: 8.11 NG/ML
RBC # BLD AUTO: 2.81 MILLION/UL (ref 3.81–5.12)
SODIUM SERPL-SCNC: 139 MMOL/L (ref 135–147)
WBC # BLD AUTO: 13.82 THOUSAND/UL (ref 4.31–10.16)

## 2024-05-07 PROCEDURE — 94640 AIRWAY INHALATION TREATMENT: CPT

## 2024-05-07 PROCEDURE — 94664 DEMO&/EVAL PT USE INHALER: CPT

## 2024-05-07 PROCEDURE — 71045 X-RAY EXAM CHEST 1 VIEW: CPT

## 2024-05-07 PROCEDURE — 84100 ASSAY OF PHOSPHORUS: CPT | Performed by: INTERNAL MEDICINE

## 2024-05-07 PROCEDURE — 99232 SBSQ HOSP IP/OBS MODERATE 35: CPT | Performed by: STUDENT IN AN ORGANIZED HEALTH CARE EDUCATION/TRAINING PROGRAM

## 2024-05-07 PROCEDURE — 94760 N-INVAS EAR/PLS OXIMETRY 1: CPT

## 2024-05-07 PROCEDURE — 97530 THERAPEUTIC ACTIVITIES: CPT

## 2024-05-07 PROCEDURE — 84145 PROCALCITONIN (PCT): CPT | Performed by: INTERNAL MEDICINE

## 2024-05-07 PROCEDURE — 80048 BASIC METABOLIC PNL TOTAL CA: CPT | Performed by: INTERNAL MEDICINE

## 2024-05-07 PROCEDURE — 85027 COMPLETE CBC AUTOMATED: CPT | Performed by: INTERNAL MEDICINE

## 2024-05-07 PROCEDURE — 97110 THERAPEUTIC EXERCISES: CPT

## 2024-05-07 RX ORDER — FUROSEMIDE 10 MG/ML
20 INJECTION INTRAMUSCULAR; INTRAVENOUS ONCE
Status: COMPLETED | OUTPATIENT
Start: 2024-05-07 | End: 2024-05-07

## 2024-05-07 RX ORDER — AMOXICILLIN 250 MG/1
1000 CAPSULE ORAL EVERY 12 HOURS SCHEDULED
Status: DISCONTINUED | OUTPATIENT
Start: 2024-05-07 | End: 2024-05-08

## 2024-05-07 RX ADMIN — METOPROLOL SUCCINATE 100 MG: 100 TABLET, EXTENDED RELEASE ORAL at 09:16

## 2024-05-07 RX ADMIN — GABAPENTIN 100 MG: 100 CAPSULE ORAL at 21:23

## 2024-05-07 RX ADMIN — ACETAMINOPHEN 650 MG: 325 TABLET, FILM COATED ORAL at 19:25

## 2024-05-07 RX ADMIN — HEPARIN SODIUM 5000 UNITS: 5000 INJECTION INTRAVENOUS; SUBCUTANEOUS at 13:31

## 2024-05-07 RX ADMIN — FUROSEMIDE 20 MG: 10 INJECTION, SOLUTION INTRAMUSCULAR; INTRAVENOUS at 17:35

## 2024-05-07 RX ADMIN — AMOXICILLIN 1000 MG: 250 CAPSULE ORAL at 21:23

## 2024-05-07 RX ADMIN — IPRATROPIUM BROMIDE 0.5 MG: 0.5 SOLUTION RESPIRATORY (INHALATION) at 20:16

## 2024-05-07 RX ADMIN — ASPIRIN 81 MG: 81 TABLET, COATED ORAL at 09:16

## 2024-05-07 RX ADMIN — AMOXICILLIN 500 MG: 250 CAPSULE ORAL at 09:18

## 2024-05-07 RX ADMIN — HEPARIN SODIUM 5000 UNITS: 5000 INJECTION INTRAVENOUS; SUBCUTANEOUS at 05:38

## 2024-05-07 RX ADMIN — IPRATROPIUM BROMIDE 0.5 MG: 0.5 SOLUTION RESPIRATORY (INHALATION) at 13:43

## 2024-05-07 RX ADMIN — PANTOPRAZOLE SODIUM 40 MG: 40 TABLET, DELAYED RELEASE ORAL at 09:16

## 2024-05-07 RX ADMIN — ACETAMINOPHEN 650 MG: 325 TABLET, FILM COATED ORAL at 05:38

## 2024-05-07 RX ADMIN — BUPROPION HYDROCHLORIDE 75 MG: 75 TABLET, FILM COATED ORAL at 09:16

## 2024-05-07 RX ADMIN — AMLODIPINE BESYLATE 10 MG: 10 TABLET ORAL at 09:16

## 2024-05-07 RX ADMIN — FUROSEMIDE 20 MG: 10 INJECTION, SOLUTION INTRAMUSCULAR; INTRAVENOUS at 18:58

## 2024-05-07 RX ADMIN — Medication 4.5 MG: at 21:23

## 2024-05-07 RX ADMIN — IPRATROPIUM BROMIDE 0.5 MG: 0.5 SOLUTION RESPIRATORY (INHALATION) at 07:09

## 2024-05-07 RX ADMIN — HEPARIN SODIUM 5000 UNITS: 5000 INJECTION INTRAVENOUS; SUBCUTANEOUS at 21:23

## 2024-05-07 NOTE — PLAN OF CARE
Problem: Potential for Falls  Goal: Patient will remain free of falls  Description: INTERVENTIONS:  - Educate patient/family on patient safety including physical limitations  - Instruct patient to call for assistance with activity   - Consult OT/PT to assist with strengthening/mobility   - Keep Call bell within reach  - Keep bed low and locked with side rails adjusted as appropriate  - Keep care items and personal belongings within reach  - Initiate and maintain comfort rounds  - Make Fall Risk Sign visible to staff  - Offer Toileting every 2 Hours, in advance of need  - Initiate/Maintain bed alarm  - Obtain necessary fall risk management equipment: bed alarm  - Apply yellow socks and bracelet for high fall risk patients  - Consider moving patient to room near nurses station  Outcome: Progressing     Problem: PAIN - ADULT  Goal: Verbalizes/displays adequate comfort level or baseline comfort level  Description: Interventions:  - Encourage patient to monitor pain and request assistance  - Assess pain using appropriate pain scale  - Administer analgesics based on type and severity of pain and evaluate response  - Implement non-pharmacological measures as appropriate and evaluate response  - Consider cultural and social influences on pain and pain management  - Notify physician/advanced practitioner if interventions unsuccessful or patient reports new pain  Outcome: Progressing     Problem: INFECTION - ADULT  Goal: Absence or prevention of progression during hospitalization  Description: INTERVENTIONS:  - Assess and monitor for signs and symptoms of infection  - Monitor lab/diagnostic results  - Monitor all insertion sites, i.e. indwelling lines, tubes, and drains  - Monitor endotracheal if appropriate and nasal secretions for changes in amount and color  - [place] appropriate cooling/warming therapies per order  - Administer medications as ordered  - Instruct and encourage patient and family to use good hand  hygiene technique  - Identify and instruct in appropriate isolation precautions for identified infection/condition  Outcome: Progressing  Goal: Absence of fever/infection during neutropenic period  Description: INTERVENTIONS:  - Monitor WBC    Outcome: Progressing     Problem: SAFETY ADULT  Goal: Patient will remain free of falls  Description: INTERVENTIONS:  - Educate patient/family on patient safety including physical limitations  - Instruct patient to call for assistance with activity   - Consult OT/PT to assist with strengthening/mobility   - Keep Call bell within reach  - Keep bed low and locked with side rails adjusted as appropriate  - Keep care items and personal belongings within reach  - Initiate and maintain comfort rounds  - Make Fall Risk Sign visible to staff  - Offer Toileting every 2 Hours, in advance of need  - Initiate/Maintain bed alarm  - Obtain necessary fall risk management equipment: bed alarm  - Apply yellow socks and bracelet for high fall risk patients  - Consider moving patient to room near nurses station  Outcome: Progressing  Goal: Maintain or return to baseline ADL function  Description: INTERVENTIONS:  -  Assess patient's ability to carry out ADLs; assess patient's baseline for ADL function and identify physical deficits which impact ability to perform ADLs (bathing, care of mouth/teeth, toileting, grooming, dressing, etc.)  - Assess/evaluate cause of self-care deficits   - Assess range of motion  - Assess patient's mobility; develop plan if impaired  - Assess patient's need for assistive devices and provide as appropriate  - Encourage maximum independence but intervene and supervise when necessary  - Involve family in performance of ADLs  - Assess for home care needs following discharge   - Consider OT consult to assist with ADL evaluation and planning for discharge  - Provide patient education as appropriate  Outcome: Progressing  Goal: Maintains/Returns to pre admission functional  level  Description: INTERVENTIONS:  - Perform AM-PAC 6 Click Basic Mobility/ Daily Activity assessment daily.  - Set and communicate daily mobility goal to care team and patient/family/caregiver.   - Collaborate with rehabilitation services on mobility goals if consulted  - Perform Range of Motion 4 times a day.  - Reposition patient every 2 hours.  - Dangle patient 3 times a day  - Stand patient 3 times a day  - Ambulate patient 3 times a day  - Out of bed to chair 3 times a day   - Out of bed for meals 3 times a day  - Out of bed for toileting  - Record patient progress and toleration of activity level   Outcome: Progressing     Problem: DISCHARGE PLANNING  Goal: Discharge to home or other facility with appropriate resources  Description: INTERVENTIONS:  - Identify barriers to discharge w/patient and caregiver  - Arrange for needed discharge resources and transportation as appropriate  - Identify discharge learning needs (meds, wound care, etc.)  - Arrange for interpretive services to assist at discharge as needed  - Refer to Case Management Department for coordinating discharge planning if the patient needs post-hospital services based on physician/advanced practitioner order or complex needs related to functional status, cognitive ability, or social support system  Outcome: Progressing     Problem: Knowledge Deficit  Goal: Patient/family/caregiver demonstrates understanding of disease process, treatment plan, medications, and discharge instructions  Description: Complete learning assessment and assess knowledge base.  Interventions:  - Provide teaching at level of understanding  - Provide teaching via preferred learning methods  Outcome: Progressing     Problem: Prexisting or High Potential for Compromised Skin Integrity  Goal: Skin integrity is maintained or improved  Description: INTERVENTIONS:  - Identify patients at risk for skin breakdown  - Assess and monitor skin integrity  - Assess and monitor nutrition  and hydration status  - Monitor labs   - Assess for incontinence   - Turn and reposition patient  - Assist with mobility/ambulation  - Relieve pressure over bony prominences  - Avoid friction and shearing  - Provide appropriate hygiene as needed including keeping skin clean and dry  - Evaluate need for skin moisturizer/barrier cream  - Collaborate with interdisciplinary team   - Patient/family teaching  - Consider wound care consult   Outcome: Progressing     Problem: Nutrition/Hydration-ADULT  Goal: Nutrient/Hydration intake appropriate for improving, restoring or maintaining nutritional needs  Description: Monitor and assess patient's nutrition/hydration status for malnutrition. Collaborate with interdisciplinary team and initiate plan and interventions as ordered.  Monitor patient's weight and dietary intake as ordered or per policy. Utilize nutrition screening tool and intervene as necessary. Determine patient's food preferences and provide high-protein, high-caloric foods as appropriate.     INTERVENTIONS:  - Monitor oral intake, urinary output, labs, and treatment plans  - Assess nutrition and hydration status and recommend course of action  - Evaluate amount of meals eaten  - Assist patient with eating if necessary   - Allow adequate time for meals  - Recommend/ encourage appropriate diets, oral nutritional supplements, and vitamin/mineral supplements  - Order, calculate, and assess calorie counts as needed  - Recommend, monitor, and adjust tube feedings and TPN/PPN based on assessed needs  - Assess need for intravenous fluids  - Provide specific nutrition/hydration education as appropriate  - Include patient/family/caregiver in decisions related to nutrition  Outcome: Progressing

## 2024-05-07 NOTE — RESPIRATORY THERAPY NOTE
"RT Protocol Note  Lauren Quintero 78 y.o. female MRN: [id number]  Unit/Bed#: -01 Encounter: [id number]    Assessment    Principal Problem:    Septic shock (HCC)  Active Problems:    Ambulatory dysfunction    Essential hypertension    Acute on chronic respiratory failure with hypoxia (HCC)    Coronary artery disease of native artery of native heart with stable angina pectoris (HCC)    Major depressive disorder, recurrent episode, moderate (HCC)    COPD (chronic obstructive pulmonary disease) (Former[facility])    Anemia    Chronic combined systolic and diastolic CHF (congestive heart failure) (Former[facility])    Acute kidney injury superimposed on chronic kidney disease  (HCC)    Physical Exam:   Assessment Type: Post-treatment  General Appearance: Alert, Awake  Respiratory Pattern: Normal  Chest Assessment: Chest expansion symmetrical  Bilateral Breath Sounds: Diminished  Cough: None  O2 Device: nc    Vitals:  Blood pressure 144/68, pulse 93, temperature 98.3 °F (36.8 °C), resp. rate 17, height 5' 7\" (1.702 m), weight 51.7 kg (113 lb 15.7 oz), SpO2 99%, not currently breastfeeding.      O2 Device: nc     Plan    Respiratory Plan: Home Bronchodilator Patient pathway        Resp Comments: will cont w current orders as per home regimen, pt resting in bed comfortably   "

## 2024-05-07 NOTE — PROGRESS NOTES
[facility]  Progress Note  Name: Lauren Quintero I  MRN: [id number]  Unit/Bed#: MS 328Slime I Date of Admission: 5/2/2024   Date of Service: 5/7/2024 I Hospital Day: 5    Assessment/Plan   Acute kidney injury superimposed on chronic kidney disease  (HCC)  Assessment & Plan  Present on admission, evidenced by a creatinine of 2.99 with a baseline of 0.7-1.1  Creatinine continues to trend down.  Etiology likely multifactorial, secondary to poor oral intake as well as ATN from septic shock/hypotension.  Continues to improve, 1.3  Monitor BMP daily  Continue to hold Entresto for now.  Avoid nephrotoxins, hypotension.    Chronic combined systolic and diastolic CHF (congestive heart failure) (Former[facility])  Assessment & Plan  History of combined CHF with the most recent EF of 35%  Pt took off Life vest : reports she will wear at home.  Continue telemetry monitor.  GDMT includes Toprol XL, Entresto  Resume Toprol-XL, continue to hold Entresto since DARLING improving.  Monitor strict intake/output  Monitor weights    Anemia  Assessment & Plan  Noted history  EGD in 2023 Mild erosive gastritis and Duodenal bulb angiectasia status post APC  Colonoscopy in  2023 Left-sided diverticulosis with old blood clots likely indicating a diverticular bleed   Had Normal out pt video capsule endoscopy.   Currently hemoglobin stable around 8 range.  Continue to trend CBC while inpt.       COPD (chronic obstructive pulmonary disease) (Former[facility])  Assessment & Plan  Noted history; + smoker  Follows with Pulmonology OP  Chonically on 4L nc at home.  Takes Advair at home.  Continue nebs.  Monitor respiratory status closely    Major depressive disorder, recurrent episode, moderate ([facility])  Assessment & Plan  Takes reports patient taking multiple agents for depression  Wellbutrin, Vrylar, and Viibryd  Currently receiving Wellbutrin, other medications are non-formulary.      Coronary artery disease of native artery of native heart with stable  angina pectoris (HCC)  Assessment & Plan  Continue with home medications - Aspirin, pravastatin, metoprolol  History of Multivessel Disease as noted in 2/24; declined CABG.      Acute on chronic respiratory failure with hypoxia (HCC)  Assessment & Plan  Chronically wears 4 L NC at baseline  Recent history of COVID-19 infection in  02/2024  CT chest Persistent multifocal consolidations may represent sequela of prior pneumonia. However, recurrent pneumonia or chronic infectious or inflammatory process is not excluded   Completed 4 days of IV ceftriaxone.  Transition to p.o. amoxicillin for total 10-day course for E. coli bacteremia.  Recommend outpatient follow-up with primary care provider to repeat CT chest imaging to monitor resolution.  Continue to trend CBC, procalcitonin.    Essential hypertension  Assessment & Plan  Patient does have history of hypertension, takes Toprol XL/Entresto at home.  She was noted to have hypotension on admission, secondary to septic shock, therefore these medications are currently on hold.    Shock is now resolved.  BP stable  Toprol XL  Hold Entresto for now due to acute kidney injury improving  Monitor vital signs closely    Ambulatory dysfunction  Assessment & Plan  Uses a walker for assistance at baseline  PT/OT following      * Septic shock (HCC)  Assessment & Plan  Patient presented to the ER with lethargy and fevers for several days.  Met SIRS criteria on admission, evidenced by tachycardia, tachypnea, leukocytosis with an elevated procalcitonin  Patient also with septic shock criteria, evidenced by bandemia, DARLING with hypotension; requiring ICU admission for pressors  She was able to receive 2.5 L fluid bolus in the ER, but due to chronic combined HF with an EF of 35%, unable to give patient further fluid boluses.  2/2 blood cultures growing pansensitive E. coli.    Shock has now resolved.  Sepsis likely secondary to pyelonephritis.  Current hemodynamically stable.  Etiology of  sepsis appears to be multifactorial - evidenced by pyelonephritis and pneumonia as seen on CT imaging.  MRSA Negative, strep pneumoniae/legionella negative.  Initially was given cefepime and vancomycin.  Subsequently completed 4 days of IV ceftriaxone.  Transitioned to p.o. amoxicillin 500 mg twice daily given currently rates less than 30: Increase p.o. amoxicillin to 1000 mg twice daily once creatinine clearance improves more than 30.  Antibiotic course through 2024 for total 10-day course  Follow-up with final culture sensitivity.  Today's update and plan:  Patient has mildly elevated WBCs with no tachycardia, hypotension.  Eating and drinking fine  Continue with amoxicillin for now               VTE Pharmacologic Prophylaxis: VTE Score: 3 Moderate Risk (Score 3-4) - Pharmacological DVT Prophylaxis Ordered: heparin.    Mobility:   Basic Mobility Inpatient Raw Score: 14  JH-HLM Goal: 4: Move to chair/commode  JH-HLM Achieved: 4: Move to chair/commode  JH-HLM Goal NOT achieved. Continue with multidisciplinary rounding and encourage appropriate mobility to improve upon JH-HLM goals.    Patient Centered Rounds: I performed bedside rounds with nursing staff today.   Discussions with Specialists or Other Care Team Provider: Care manager    Education and Discussions with Family / Patient: Will reach out to family regarding patient follow-up plan        Current Length of Stay: 5 day(s)  Current Patient Status: Inpatient   Certification Statement: The patient will continue to require additional inpatient hospital stay due to mild bump in WBCs count and fever curve monitoring  Discharge Plan: Anticipate discharge in 24-48 hrs to rehab facility.    Code Status: Level 1 - Full Code    Subjective:   Patient is eating and drinking fine.  Feeling better than yesterday.  Denied complaint of chest pain, shortness of breath    Objective:     Vitals:   Temp (24hrs), Av.4 °F (36.9 °C), Min:97.5 °F (36.4 °C), Max:100.4 °F (38  °C)    Temp:  [97.5 °F (36.4 °C)-100.4 °F (38 °C)] 98.9 °F (37.2 °C)  HR:  [] 110  Resp:  [14-32] 14  BP: (136-228)/(66-93) 136/67  SpO2:  [91 %-99 %] 94 %  Body mass index is 17.85 kg/m².     Input and Output Summary (last 24 hours):     Intake/Output Summary (Last 24 hours) at 5/7/2024 1508  Last data filed at 5/7/2024 1235  Gross per 24 hour   Intake 180 ml   Output 620 ml   Net -440 ml       Physical Exam:   Constitutional: No cardiovascular distress  HEENT: Pallor or icterus negative  CVS: S1 plus S2  Respiratory: Normal vascular breathe without crackles and wheeze  Gastroenterology: Soft nontender without any palpable mass  Skin: No bruises or ecchymosis  Neurology: No focal logical deficit      Additional Data:     Labs:  Results from last 7 days   Lab Units 05/07/24 0524 05/06/24  0519 05/05/24  0549 05/04/24 0452 05/03/24  0550   WBC Thousand/uL 13.82*   < > 12.84*   < > 18.40*   HEMOGLOBIN g/dL 8.4*   < > 9.6*   < > 8.0*   HEMATOCRIT % 26.6*   < > 30.9*   < > 25.8*   PLATELETS Thousands/uL 127*   < > 129*   < > 120*   BANDS PCT %  --   --   --   --  8   SEGS PCT %  --   --  88*  --   --    LYMPHO PCT %  --   --  4*  --  6*   MONO PCT %  --   --  6  --  1*   EOS PCT %  --   --  0  --  0    < > = values in this interval not displayed.     Results from last 7 days   Lab Units 05/07/24 0524 05/04/24 0452 05/03/24  0550   SODIUM mmol/L 139   < > 140   POTASSIUM mmol/L 3.5   < > 4.4   CHLORIDE mmol/L 106   < > 109*   CO2 mmol/L 29   < > 24   BUN mg/dL 40*   < > 55*   CREATININE mg/dL 1.32*   < > 2.59*   ANION GAP mmol/L 4   < > 7   CALCIUM mg/dL 8.6   < > 7.8*   ALBUMIN g/dL  --   --  2.8*   TOTAL BILIRUBIN mg/dL  --   --  0.39   ALK PHOS U/L  --   --  63   ALT U/L  --   --  3*   AST U/L  --   --  13   GLUCOSE RANDOM mg/dL 110   < > 139    < > = values in this interval not displayed.     Results from last 7 days   Lab Units 05/03/24  0550   INR  1.31*     Results from last 7 days   Lab Units  05/05/24  1521 05/02/24  2301 05/02/24  1909   POC GLUCOSE mg/dl 128 171* 146*         Results from last 7 days   Lab Units 05/07/24  0524 05/05/24  0549 05/04/24  0452 05/03/24  0550 05/02/24  1924   LACTIC ACID mmol/L  --   --   --   --  1.2   PROCALCITONIN ng/ml 8.11* 38.59* 79.62* 141.27* 225.38*       Lines/Drains:  Invasive Devices       Peripheral Intravenous Line  Duration             Peripheral IV 05/05/24 Left Antecubital 2 days                      Telemetry:  Telemetry Orders (From admission, onward)               LifeVest Patient: Continuous Telemetry Monitoring during hospitalization (non-expiring)  Continuous LifeVest Telemetry Monitoring        References:    LifeVest Policy        LifeVest Patient: Continuous Telemetry Monitoring during hospitalization (non-expiring)  (Lifevest Evaluation for removal/continue)  Continuous LifeVest Telemetry Monitoring        References:    LifeVest Policy                     Telemetry Reviewed: Normal Sinus Rhythm  Indication for Continued Telemetry Use: No indication for continued use. Will discontinue.              Imaging: No pertinent imaging reviewed.    Recent Cultures (last 7 days):   Results from last 7 days   Lab Units 05/03/24  0005 05/02/24  1944 05/02/24  1925 05/02/24  1924   BLOOD CULTURE   --   --  Escherichia coli* Escherichia coli*   GRAM STAIN RESULT   --   --  Gram negative rods* Gram negative rods*   URINE CULTURE   --  >100,000 cfu/ml Escherichia coli*  50,000-59,000 cfu/ml Beta Hemolytic Streptococcus Group B*  --   --    LEGIONELLA URINARY ANTIGEN  Negative  --   --   --        Last 24 Hours Medication List:   Current Facility-Administered Medications   Medication Dose Route Frequency Provider Last Rate    acetaminophen  650 mg Oral Q6H PRN Allison Julian, DO      albuterol  2.5 mg Nebulization Q4H PRN Allison Julian, DO      amLODIPine  10 mg Oral Daily Allison Julian, DO      amoxicillin  1,000 mg Oral Q12H ROSELINE  Clayton Ritchie MD      aspirin  81 mg Oral Daily Allisonzachery Julian, DO      buPROPion  75 mg Oral Daily Allisonzachery Julian, DO      gabapentin  100 mg Oral HS Allison Julian, DO      heparin (porcine)  5,000 Units Subcutaneous Q8H ROSELINE Allison Julian, DO      ipratropium  0.5 mg Nebulization TID Allison Julian, DO      melatonin  4.5 mg Oral HS Allison Julian, DO      metoprolol succinate  100 mg Oral Daily Allison Julian, DO      pantoprazole  40 mg Oral Daily Allison Julian, DO          Today, Patient Was Seen By: Clayton Ritchie MD    **Please Note: This note may have been constructed using a voice recognition system.**

## 2024-05-07 NOTE — ASSESSMENT & PLAN NOTE
Patient presented to the ER with lethargy and fevers for several days.  Met SIRS criteria on admission, evidenced by tachycardia, tachypnea, leukocytosis with an elevated procalcitonin  Patient also with septic shock criteria, evidenced by bandemia, DARLING with hypotension; requiring ICU admission for pressors  She was able to receive 2.5 L fluid bolus in the ER, but due to chronic combined HF with an EF of 35%, unable to give patient further fluid boluses.  2/2 blood cultures growing pansensitive E. coli.    Shock has now resolved.  Sepsis likely secondary to pyelonephritis.  Current hemodynamically stable.  Etiology of sepsis appears to be multifactorial - evidenced by pyelonephritis and pneumonia as seen on CT imaging.  MRSA Negative, strep pneumoniae/legionella negative.  Initially was given cefepime and vancomycin.  Subsequently completed 4 days of IV ceftriaxone.  Transitioned to p.o. amoxicillin 500 mg twice daily given currently rates less than 30: Increase p.o. amoxicillin to 1000 mg twice daily once creatinine clearance improves more than 30.  Antibiotic course through 5/11/2024 for total 10-day course  Follow-up with final culture sensitivity.  Today's update and plan:  Patient has mildly elevated WBCs with no tachycardia, hypotension.  Eating and drinking fine  Continue with amoxicillin for now

## 2024-05-07 NOTE — PLAN OF CARE
Problem: Potential for Falls  Goal: Patient will remain free of falls  Description: INTERVENTIONS:  - Educate patient/family on patient safety including physical limitations  - Instruct patient to call for assistance with activity   - Consult OT/PT to assist with strengthening/mobility   - Keep Call bell within reach  - Keep bed low and locked with side rails adjusted as appropriate  - Keep care items and personal belongings within reach  - Initiate and maintain comfort rounds  - Make Fall Risk Sign visible to staff  - Offer Toileting every 2 Hours, in advance of need  - Initiate/Maintain bed alarm  - Obtain necessary fall risk management equipment: bed alarm  - Apply yellow socks and bracelet for high fall risk patients  - Consider moving patient to room near nurses station  Outcome: Progressing     Problem: PAIN - ADULT  Goal: Verbalizes/displays adequate comfort level or baseline comfort level  Description: Interventions:  - Encourage patient to monitor pain and request assistance  - Assess pain using appropriate pain scale  - Administer analgesics based on type and severity of pain and evaluate response  - Implement non-pharmacological measures as appropriate and evaluate response  - Consider cultural and social influences on pain and pain management  - Notify physician/advanced practitioner if interventions unsuccessful or patient reports new pain  Outcome: Progressing     Problem: INFECTION - ADULT  Goal: Absence or prevention of progression during hospitalization  Description: INTERVENTIONS:  - Assess and monitor for signs and symptoms of infection  - Monitor lab/diagnostic results  - Monitor all insertion sites, i.e. indwelling lines, tubes, and drains  - Monitor endotracheal if appropriate and nasal secretions for changes in amount and color  - [place] appropriate cooling/warming therapies per order  - Administer medications as ordered  - Instruct and encourage patient and family to use good hand  hygiene technique  - Identify and instruct in appropriate isolation precautions for identified infection/condition  Outcome: Progressing  Goal: Absence of fever/infection during neutropenic period  Description: INTERVENTIONS:  - Monitor WBC    Outcome: Progressing     Problem: SAFETY ADULT  Goal: Patient will remain free of falls  Description: INTERVENTIONS:  - Educate patient/family on patient safety including physical limitations  - Instruct patient to call for assistance with activity   - Consult OT/PT to assist with strengthening/mobility   - Keep Call bell within reach  - Keep bed low and locked with side rails adjusted as appropriate  - Keep care items and personal belongings within reach  - Initiate and maintain comfort rounds  - Make Fall Risk Sign visible to staff  - Offer Toileting every 2 Hours, in advance of need  - Initiate/Maintain bed alarm  - Obtain necessary fall risk management equipment: bed alarm  - Apply yellow socks and bracelet for high fall risk patients  - Consider moving patient to room near nurses station  Outcome: Progressing  Goal: Maintain or return to baseline ADL function  Description: INTERVENTIONS:  -  Assess patient's ability to carry out ADLs; assess patient's baseline for ADL function and identify physical deficits which impact ability to perform ADLs (bathing, care of mouth/teeth, toileting, grooming, dressing, etc.)  - Assess/evaluate cause of self-care deficits   - Assess range of motion  - Assess patient's mobility; develop plan if impaired  - Assess patient's need for assistive devices and provide as appropriate  - Encourage maximum independence but intervene and supervise when necessary  - Involve family in performance of ADLs  - Assess for home care needs following discharge   - Consider OT consult to assist with ADL evaluation and planning for discharge  - Provide patient education as appropriate  Outcome: Progressing  Goal: Maintains/Returns to pre admission functional  level  Description: INTERVENTIONS:  - Perform AM-PAC 6 Click Basic Mobility/ Daily Activity assessment daily.  - Set and communicate daily mobility goal to care team and patient/family/caregiver.   - Collaborate with rehabilitation services on mobility goals if consulted  - Perform Range of Motion 4 times a day.  - Reposition patient every 2 hours.  - Dangle patient 3 times a day  - Stand patient 3 times a day  - Ambulate patient 3 times a day  - Out of bed to chair 3 times a day   - Out of bed for meals 3 times a day  - Out of bed for toileting  - Record patient progress and toleration of activity level   Outcome: Progressing     Problem: DISCHARGE PLANNING  Goal: Discharge to home or other facility with appropriate resources  Description: INTERVENTIONS:  - Identify barriers to discharge w/patient and caregiver  - Arrange for needed discharge resources and transportation as appropriate  - Identify discharge learning needs (meds, wound care, etc.)  - Arrange for interpretive services to assist at discharge as needed  - Refer to Case Management Department for coordinating discharge planning if the patient needs post-hospital services based on physician/advanced practitioner order or complex needs related to functional status, cognitive ability, or social support system  Outcome: Progressing     Problem: Knowledge Deficit  Goal: Patient/family/caregiver demonstrates understanding of disease process, treatment plan, medications, and discharge instructions  Description: Complete learning assessment and assess knowledge base.  Interventions:  - Provide teaching at level of understanding  - Provide teaching via preferred learning methods  Outcome: Progressing     Problem: Prexisting or High Potential for Compromised Skin Integrity  Goal: Skin integrity is maintained or improved  Description: INTERVENTIONS:  - Identify patients at risk for skin breakdown  - Assess and monitor skin integrity  - Assess and monitor nutrition  and hydration status  - Monitor labs   - Assess for incontinence   - Turn and reposition patient  - Assist with mobility/ambulation  - Relieve pressure over bony prominences  - Avoid friction and shearing  - Provide appropriate hygiene as needed including keeping skin clean and dry  - Evaluate need for skin moisturizer/barrier cream  - Collaborate with interdisciplinary team   - Patient/family teaching  - Consider wound care consult   Outcome: Progressing     Problem: Nutrition/Hydration-ADULT  Goal: Nutrient/Hydration intake appropriate for improving, restoring or maintaining nutritional needs  Description: Monitor and assess patient's nutrition/hydration status for malnutrition. Collaborate with interdisciplinary team and initiate plan and interventions as ordered.  Monitor patient's weight and dietary intake as ordered or per policy. Utilize nutrition screening tool and intervene as necessary. Determine patient's food preferences and provide high-protein, high-caloric foods as appropriate.     INTERVENTIONS:  - Monitor oral intake, urinary output, labs, and treatment plans  - Assess nutrition and hydration status and recommend course of action  - Evaluate amount of meals eaten  - Assist patient with eating if necessary   - Allow adequate time for meals  - Recommend/ encourage appropriate diets, oral nutritional supplements, and vitamin/mineral supplements  - Order, calculate, and assess calorie counts as needed  - Recommend, monitor, and adjust tube feedings and TPN/PPN based on assessed needs  - Assess need for intravenous fluids  - Provide specific nutrition/hydration education as appropriate  - Include patient/family/caregiver in decisions related to nutrition  Outcome: Progressing

## 2024-05-07 NOTE — ASSESSMENT & PLAN NOTE
Chronically wears 4 L NC at baseline  Recent history of COVID-19 infection in  02/2024  CT chest Persistent multifocal consolidations may represent sequela of prior pneumonia. However, recurrent pneumonia or chronic infectious or inflammatory process is not excluded   Completed 4 days of IV ceftriaxone.  Transition to p.o. amoxicillin for total 10-day course for E. coli bacteremia.  Recommend outpatient follow-up with primary care provider to repeat CT chest imaging to monitor resolution.  Continue to trend CBC, procalcitonin.

## 2024-05-07 NOTE — PLAN OF CARE
Problem: INFECTION - ADULT  Goal: Absence or prevention of progression during hospitalization  Description: INTERVENTIONS:  - Assess and monitor for signs and symptoms of infection  - Monitor lab/diagnostic results  - Monitor all insertion sites, i.e. indwelling lines, tubes, and drains  - Monitor endotracheal if appropriate and nasal secretions for changes in amount and color  - [place] appropriate cooling/warming therapies per order  - Administer medications as ordered  - Instruct and encourage patient and family to use good hand hygiene technique  - Identify and instruct in appropriate isolation precautions for identified infection/condition  Outcome: Progressing     Problem: SAFETY ADULT  Goal: Patient will remain free of falls  Description: INTERVENTIONS:  - Educate patient/family on patient safety including physical limitations  - Instruct patient to call for assistance with activity   - Consult OT/PT to assist with strengthening/mobility   - Keep Call bell within reach  - Keep bed low and locked with side rails adjusted as appropriate  - Keep care items and personal belongings within reach  - Initiate and maintain comfort rounds  -   Problem: Potential for Falls  Goal: Patient will remain free of falls  Description: INTERVENTIONS:  - Educate patient/family on patient safety including physical limitations  - Instruct patient to call for assistance with activity   - Consult OT/PT to assist with strengthening/mobility   - Keep Call bell within reach  - Keep bed low and locked with side rails adjusted as appropriate  - Keep care items and personal belongings within reach  -   Problem: Nutrition/Hydration-ADULT  Goal: Nutrient/Hydration intake appropriate for improving, restoring or maintaining nutritional needs  Description: Monitor and assess patient's nutrition/hydration status for malnutrition. Collaborate with interdisciplinary team and initiate plan and interventions as ordered.  Monitor patient's weight  and dietary intake as ordered or per policy. Utilize nutrition screening tool and intervene as necessary. Determine patient's food preferences and provide high-protein, high-caloric foods as appropriate.     INTERVENTIONS:  - Monitor oral intake, urinary output, labs, and treatment plans  - Assess nutrition and hydration status and recommend course of action  - Evaluate amount of meals eaten  - Assist patient with eating if necessary   - Allow adequate time for meals  - Recommend/ encourage appropriate diets, oral nutritional supplements, and vitamin/mineral supplements  - Order, calculate, and assess calorie counts as needed  - Recommend, monitor, and adjust tube feedings and TPN/PPN based on assessed needs  - Assess need for intravenous fluids  - Provide specific nutrition/hydration education as appropriate  - Include patient/family/caregiver in decisions related to nutrition  Outcome: Progressing     Problem: Nutrition/Hydration-ADULT  Goal: Nutrient/Hydration intake appropriate for improving, restoring or maintaining nutritional needs  Description: Monitor and assess patient's nutrition/hydration status for malnutrition. Collaborate with interdisciplinary team and initiate plan and interventions as ordered.  Monitor patient's weight and dietary intake as ordered or per policy. Utilize nutrition screening tool and intervene as necessary. Determine patient's food preferences and provide high-protein, high-caloric foods as appropriate.     INTERVENTIONS:  - Monitor oral intake, urinary output, labs, and treatment plans  - Assess nutrition and hydration status and recommend course of action  - Evaluate amount of meals eaten  - Assist patient with eating if necessary   - Allow adequate time for meals  - Recommend/ encourage appropriate diets, oral nutritional supplements, and vitamin/mineral supplements  - Order, calculate, and assess calorie counts as needed  - Recommend, monitor, and adjust tube feedings and  TPN/PPN based on assessed needs  - Assess need for intravenous fluids  - Provide specific nutrition/hydration education as appropriate  - Include patient/family/caregiver in decisions related to nutrition  Outcome: Progressing   Outcome: Progressing   - Apply yellow socks and bracelet for high fall risk patients  - Consider moving patient to room near nurses station  Outcome: Progressing

## 2024-05-07 NOTE — PLAN OF CARE
Problem: PHYSICAL THERAPY ADULT  Goal: Performs mobility at highest level of function for planned discharge setting.  See evaluation for individualized goals.  Description: Treatment/Interventions: Functional transfer training, LE strengthening/ROM, Elevations, Therapeutic exercise, Endurance training, Patient/family training, Equipment eval/education, Bed mobility, Gait training, Compensatory technique education, Spoke to nursing, OT  Equipment Recommended:  (pt already has rollator)       See flowsheet documentation for full assessment, interventions and recommendations.  Outcome: Progressing  Note: Prognosis: Good  Problem List: Decreased strength, Decreased endurance, Impaired balance, Decreased mobility, Decreased coordination  Assessment: Pt seen for PT treatment session this date, consisting of ther ex focused on strengthening and gt training on level surfaces to improve pt safety in household environment. Since previous session, pt has made good progress in terms of increased activity tolerance. Pertinent barriers during this session include SOB. Current goals and POC established on IE remain appropriate, pt continues to have rehab potential and is making good progress towards STGs. Pt prognosis for achieving goals is good, pending pt progress with hospitalization/medical status improvements, and indicated by motivation, previous response to intervention, and responsive to cues/strategies. Pt limited d/t fear of falling and medical instability. Pt continues to be functioning below baseline level, and remains limited 2* factors listed above. PT will continue to see pt during current hospitalization in order to address the deficits listed above and provide interventions consistent w/ POC in effort to achieve STGs. PT recommends level 2, moderate resource intensity upon discharge.  Barriers to Discharge: None     Rehab Resource Intensity Level, PT: II (Moderate Resource Intensity)    See flowsheet  documentation for full assessment.

## 2024-05-07 NOTE — PLAN OF CARE
Problem: OCCUPATIONAL THERAPY ADULT  Goal: Performs self-care activities at highest level of function for planned discharge setting.  See evaluation for individualized goals.  Description: Treatment Interventions: ADL retraining, Functional transfer training, Endurance training, Patient/family training, Equipment evaluation/education, Compensatory technique education, Continued evaluation, Cardiac education, Energy conservation, Activityengagement          See flowsheet documentation for full assessment, interventions and recommendations.   Note: Limitation: Decreased UE strength, Decreased ADL status, Decreased endurance, Decreased self-care trans, Decreased high-level ADLs  Prognosis: Good  Assessment: Pt participated in skilled OT session today addressing the following interventions ADL retraining with proper body mechanics, Patient / Family Education, Transfer Training, Therapeutic Activity, Safety Awareness, Fall Prevention, DME training, Back safety education & training, Activity tolerance training, Standing tolerance training, and Sitting/ standing balance task to increase EOB/ OOB sarmad performance tolerance. Upon arrival, OT completed 2 pt identifiers.  Pt agreeable to OT treatment session, upon arrival patient was found alert, responsive and in no apparent distress. Pt completed bed mobility with min A.  Pt completed sit to stand with mod A.  Pt completed ADLS at sink.   Pt was able to complete UB ADLS with min A and LB ADLS with mod A.  Pt completed functional mobility with RW and min A. Pt had to use bathroom and insisted on using commode chair at sink vs commode chair over toilet. OT discussed use of toilet with commode for height and safety, isabella as it was only 5 steps away. Pt agreed to attempt to complete mobility to toilet, however when she stood up bowel movement was uncontrolled and went onto floor. Pt then sat on commode to finish. Pt completed xfer to commode with RW and min A.  Pt requiring VCs  and A throughout and edu in breath support, pursed lipped breathing, safety, pacing, and increasing independence with ADL performance. Pt continues to be functioning below baseline level, occupational performance remains limited secondary to factors listed above and increased risk for falls and injury. From OT standpoint, recommendation at time of d/c would be Level 2 (Mod Resource Intensity).    Pt to benefit from continued Occupational Therapy treatment while in the hospital to address deficits as defined above and maximize level of functional independence with ADLs and functional mobility.     Rehab Resource Intensity Level, OT: II (Moderate Resource Intensity)

## 2024-05-07 NOTE — ASSESSMENT & PLAN NOTE
Present on admission, evidenced by a creatinine of 2.99 with a baseline of 0.7-1.1  Creatinine continues to trend down.  Etiology likely multifactorial, secondary to poor oral intake as well as ATN from septic shock/hypotension.  Continues to improve, 1.3  Monitor BMP daily  Continue to hold Entresto for now.  Avoid nephrotoxins, hypotension.

## 2024-05-07 NOTE — PHYSICAL THERAPY NOTE
"                                                                                  PHYSICAL THERAPY NOTE          Patient Name: Lauren Quintero  Today's Date: 5/7/2024 05/07/24 0948   PT Last Visit   PT Visit Date 05/07/24   Note Type   Note Type Treatment   Pain Assessment   Pain Assessment Tool 0-10   Pain Score No Pain   Restrictions/Precautions   Weight Bearing Precautions Per Order No   Other Precautions Chair Alarm;Bed Alarm;Multiple lines;Telemetry;O2;Fall Risk   General   Chart Reviewed Yes   Response to Previous Treatment Patient with no complaints from previous session.   Family/Caregiver Present No   Cognition   Overall Cognitive Status WFL   Arousal/Participation Alert;Responsive;Cooperative   Attention Within functional limits   Orientation Level Oriented to person;Oriented to place;Disoriented to time;Disoriented to situation   Memory Within functional limits   Following Commands Follows multistep commands with increased time or repetition   Subjective   Subjective \"I am just tired\"   Bed Mobility   Supine to Sit 4  Minimal assistance   Additional items Assist x 1;Bedrails;Increased time required;Verbal cues;LE management   Transfers   Sit to Stand 3  Moderate assistance   Additional items Assist x 1;Bedrails;Increased time required;Verbal cues   Stand to Sit 3  Moderate assistance   Additional items Assist x 1;Armrests;Increased time required;Verbal cues   Additional Comments with RW   Ambulation/Elevation   Gait pattern Improper Weight shift;Short stride;Decreased hip extension;Decreased toe off;Decreased heel strike;Excessively slow   Gait Assistance 3  Moderate assist   Additional items Assist x 1;Verbal cues   Assistive Device Rolling walker   Distance 3' to chair   Balance   Static Sitting Fair +   Dynamic Sitting Fair   Static Standing Fair -   Dynamic Standing Poor +   Ambulatory Poor +   Endurance Deficit   Endurance Deficit Yes   Endurance Deficit Description weakness and fatigue   Activity " Tolerance   Activity Tolerance Patient limited by fatigue   Nurse Made Aware cleared and updated   Exercises   Heelslides Supine;5 reps;AROM;Bilateral   Hip Abduction Supine;AROM;Bilateral;20 reps   Knee AROM Long Arc Quad Supine;25 reps;AROM;Bilateral   Ankle Pumps Supine;25 reps;AROM;Bilateral   Assessment   Prognosis Good   Problem List Decreased strength;Decreased endurance;Impaired balance;Decreased mobility;Decreased coordination   Assessment Pt seen for PT treatment session this date, consisting of ther ex focused on strengthening and gt training on level surfaces to improve pt safety in household environment. Since previous session, pt has made good progress in terms of increased activity tolerance. Pertinent barriers during this session include SOB. Current goals and POC established on IE remain appropriate, pt continues to have rehab potential and is making good progress towards STGs. Pt prognosis for achieving goals is good, pending pt progress with hospitalization/medical status improvements, and indicated by motivation, previous response to intervention, and responsive to cues/strategies. Pt limited d/t fear of falling and medical instability. Pt continues to be functioning below baseline level, and remains limited 2* factors listed above. PT will continue to see pt during current hospitalization in order to address the deficits listed above and provide interventions consistent w/ POC in effort to achieve STGs. PT recommends level 2, moderate resource intensity upon discharge.   Barriers to Discharge None   Goals   Patient Goals to get stronger   STG Expiration Date 05/17/24   Short Term Goal #1 Progressing towards goals   PT Treatment Day 1   Plan   Treatment/Interventions Functional transfer training;LE strengthening/ROM;Elevations;Therapeutic exercise;Endurance training;Patient/family training;Equipment eval/education;Bed mobility;Gait training   Progress Progressing toward goals   PT Frequency  3-5x/wk   Discharge Recommendation   Rehab Resource Intensity Level, PT II (Moderate Resource Intensity)   Equipment Recommended Walker   AM-PAC Basic Mobility Inpatient   Turning in Flat Bed Without Bedrails 3   Lying on Back to Sitting on Edge of Flat Bed Without Bedrails 3   Moving Bed to Chair 2   Standing Up From Chair Using Arms 2   Walk in Room 2   Climb 3-5 Stairs With Railing 2   Basic Mobility Inpatient Raw Score 14   Basic Mobility Standardized Score 35.55   [facility] Highest Level Of Mobility   -[facility] Goal 4: Move to chair/commode   -[facility] Achieved 4: Move to chair/commode   Education   Education Provided Mobility training;Assistive device   Patient Demonstrates acceptance/verbal understanding   End of Consult   Patient Position at End of Consult Bedside chair;Bed/Chair alarm activated;All needs within reach       Abhi Diane, PT,DPT

## 2024-05-07 NOTE — RESPIRATORY THERAPY NOTE
05/06/24 2047   Respiratory Protocol   Protocol Initiated? No   Protocol Selection Respiratory   Language Barrier? No   Medical & Social History Reviewed? Yes   Diagnostic Studies Reviewed? Yes   Physical Assessment Performed? Yes   Home Devices/Therapy Home O2   Respiratory Plan Home Bronchodilator Patient pathway   Respiratory Assessment   General Appearance Awake;Alert   Respiratory Pattern Normal   Chest Assessment Chest expansion symmetrical   Bilateral Breath Sounds Clear   Cough None   Resp Comments even non labored respirations   O2 Device 2L o2 nc   Additional Assessments   SpO2 96 %     Continue with scheduled aerosol per home regimen

## 2024-05-07 NOTE — ASSESSMENT & PLAN NOTE
Patient does have history of hypertension, takes Toprol XL/Entresto at home.  She was noted to have hypotension on admission, secondary to septic shock, therefore these medications are currently on hold.    Shock is now resolved.  BP stable  Toprol XL  Hold Entresto for now due to acute kidney injury improving  Monitor vital signs closely

## 2024-05-08 ENCOUNTER — APPOINTMENT (INPATIENT)
Dept: CT IMAGING | Facility: HOSPITAL | Age: 79
DRG: 871 | End: 2024-05-08
Payer: MEDICARE

## 2024-05-08 LAB
ANION GAP SERPL CALCULATED.3IONS-SCNC: 5 MMOL/L (ref 4–13)
B PARAP IS1001 DNA NPH QL NAA+NON-PROBE: NOT DETECTED
B PERT.PT PRMT NPH QL NAA+NON-PROBE: NOT DETECTED
BACTERIA UR QL AUTO: ABNORMAL /HPF
BILIRUB UR QL STRIP: NEGATIVE
BUN SERPL-MCNC: 36 MG/DL (ref 5–25)
C PNEUM DNA NPH QL NAA+NON-PROBE: NOT DETECTED
CALCIUM SERPL-MCNC: 8.5 MG/DL (ref 8.4–10.2)
CHLORIDE SERPL-SCNC: 104 MMOL/L (ref 96–108)
CLARITY UR: CLEAR
CO2 SERPL-SCNC: 31 MMOL/L (ref 21–32)
COLOR UR: ABNORMAL
CREAT SERPL-MCNC: 1.45 MG/DL (ref 0.6–1.3)
ERYTHROCYTE [DISTWIDTH] IN BLOOD BY AUTOMATED COUNT: 15.7 % (ref 11.6–15.1)
FLUAV RNA NPH QL NAA+NON-PROBE: NOT DETECTED
FLUBV RNA NPH QL NAA+NON-PROBE: NOT DETECTED
GFR SERPL CREATININE-BSD FRML MDRD: 34 ML/MIN/1.73SQ M
GLUCOSE SERPL-MCNC: 113 MG/DL (ref 65–140)
GLUCOSE UR STRIP-MCNC: NEGATIVE MG/DL
HADV DNA NPH QL NAA+NON-PROBE: NOT DETECTED
HCOV 229E RNA NPH QL NAA+NON-PROBE: NOT DETECTED
HCOV HKU1 RNA NPH QL NAA+NON-PROBE: NOT DETECTED
HCOV NL63 RNA NPH QL NAA+NON-PROBE: NOT DETECTED
HCOV OC43 RNA NPH QL NAA+NON-PROBE: NOT DETECTED
HCT VFR BLD AUTO: 26.2 % (ref 34.8–46.1)
HGB BLD-MCNC: 8.3 G/DL (ref 11.5–15.4)
HGB UR QL STRIP.AUTO: ABNORMAL
HMPV RNA NPH QL NAA+NON-PROBE: NOT DETECTED
HPIV1 RNA NPH QL NAA+NON-PROBE: NOT DETECTED
HPIV2 RNA NPH QL NAA+NON-PROBE: NOT DETECTED
HPIV3 RNA NPH QL NAA+NON-PROBE: NOT DETECTED
HPIV4 RNA NPH QL NAA+NON-PROBE: NOT DETECTED
KETONES UR STRIP-MCNC: NEGATIVE MG/DL
LEUKOCYTE ESTERASE UR QL STRIP: ABNORMAL
M PNEUMO DNA NPH QL NAA+NON-PROBE: NOT DETECTED
MCH RBC QN AUTO: 30 PG (ref 26.8–34.3)
MCHC RBC AUTO-ENTMCNC: 31.7 G/DL (ref 31.4–37.4)
MCV RBC AUTO: 95 FL (ref 82–98)
NITRITE UR QL STRIP: NEGATIVE
NON-SQ EPI CELLS URNS QL MICRO: ABNORMAL /HPF
PH UR STRIP.AUTO: 5.5 [PH]
PLATELET # BLD AUTO: 149 THOUSANDS/UL (ref 149–390)
PMV BLD AUTO: 10.8 FL (ref 8.9–12.7)
POTASSIUM SERPL-SCNC: 3.6 MMOL/L (ref 3.5–5.3)
PROCALCITONIN SERPL-MCNC: 4.15 NG/ML
PROT UR STRIP-MCNC: ABNORMAL MG/DL
RBC # BLD AUTO: 2.77 MILLION/UL (ref 3.81–5.12)
RBC #/AREA URNS AUTO: ABNORMAL /HPF
RSV RNA NPH QL NAA+NON-PROBE: NOT DETECTED
RV+EV RNA NPH QL NAA+NON-PROBE: NOT DETECTED
SARS-COV-2 RNA NPH QL NAA+NON-PROBE: NOT DETECTED
SODIUM SERPL-SCNC: 140 MMOL/L (ref 135–147)
SP GR UR STRIP.AUTO: 1.01 (ref 1–1.03)
UROBILINOGEN UR STRIP-ACNC: <2 MG/DL
WBC # BLD AUTO: 11.54 THOUSAND/UL (ref 4.31–10.16)
WBC #/AREA URNS AUTO: ABNORMAL /HPF

## 2024-05-08 PROCEDURE — 85027 COMPLETE CBC AUTOMATED: CPT | Performed by: STUDENT IN AN ORGANIZED HEALTH CARE EDUCATION/TRAINING PROGRAM

## 2024-05-08 PROCEDURE — 94664 DEMO&/EVAL PT USE INHALER: CPT

## 2024-05-08 PROCEDURE — 94640 AIRWAY INHALATION TREATMENT: CPT

## 2024-05-08 PROCEDURE — 0202U NFCT DS 22 TRGT SARS-COV-2: CPT | Performed by: STUDENT IN AN ORGANIZED HEALTH CARE EDUCATION/TRAINING PROGRAM

## 2024-05-08 PROCEDURE — 81001 URINALYSIS AUTO W/SCOPE: CPT | Performed by: STUDENT IN AN ORGANIZED HEALTH CARE EDUCATION/TRAINING PROGRAM

## 2024-05-08 PROCEDURE — 87040 BLOOD CULTURE FOR BACTERIA: CPT | Performed by: STUDENT IN AN ORGANIZED HEALTH CARE EDUCATION/TRAINING PROGRAM

## 2024-05-08 PROCEDURE — 80048 BASIC METABOLIC PNL TOTAL CA: CPT | Performed by: STUDENT IN AN ORGANIZED HEALTH CARE EDUCATION/TRAINING PROGRAM

## 2024-05-08 PROCEDURE — 92526 ORAL FUNCTION THERAPY: CPT

## 2024-05-08 PROCEDURE — 99232 SBSQ HOSP IP/OBS MODERATE 35: CPT | Performed by: STUDENT IN AN ORGANIZED HEALTH CARE EDUCATION/TRAINING PROGRAM

## 2024-05-08 PROCEDURE — 71250 CT THORAX DX C-: CPT

## 2024-05-08 PROCEDURE — 94760 N-INVAS EAR/PLS OXIMETRY 1: CPT

## 2024-05-08 PROCEDURE — 84145 PROCALCITONIN (PCT): CPT | Performed by: STUDENT IN AN ORGANIZED HEALTH CARE EDUCATION/TRAINING PROGRAM

## 2024-05-08 RX ORDER — BUDESONIDE AND FORMOTEROL FUMARATE DIHYDRATE 80; 4.5 UG/1; UG/1
2 AEROSOL RESPIRATORY (INHALATION) 2 TIMES DAILY
Status: DISCONTINUED | OUTPATIENT
Start: 2024-05-08 | End: 2024-05-15 | Stop reason: HOSPADM

## 2024-05-08 RX ORDER — FUROSEMIDE 20 MG/1
20 TABLET ORAL DAILY
Status: DISCONTINUED | OUTPATIENT
Start: 2024-05-08 | End: 2024-05-09

## 2024-05-08 RX ADMIN — Medication 2.5 MG: at 21:36

## 2024-05-08 RX ADMIN — HEPARIN SODIUM 5000 UNITS: 5000 INJECTION INTRAVENOUS; SUBCUTANEOUS at 21:37

## 2024-05-08 RX ADMIN — ASPIRIN 81 MG: 81 TABLET, COATED ORAL at 09:08

## 2024-05-08 RX ADMIN — BUDESONIDE AND FORMOTEROL FUMARATE DIHYDRATE 2 PUFF: 80; 4.5 AEROSOL RESPIRATORY (INHALATION) at 18:29

## 2024-05-08 RX ADMIN — AMLODIPINE BESYLATE 10 MG: 10 TABLET ORAL at 09:09

## 2024-05-08 RX ADMIN — IPRATROPIUM BROMIDE 0.5 MG: 0.5 SOLUTION RESPIRATORY (INHALATION) at 07:35

## 2024-05-08 RX ADMIN — ACETAMINOPHEN 650 MG: 325 TABLET, FILM COATED ORAL at 11:11

## 2024-05-08 RX ADMIN — BUDESONIDE AND FORMOTEROL FUMARATE DIHYDRATE 2 PUFF: 80; 4.5 AEROSOL RESPIRATORY (INHALATION) at 10:25

## 2024-05-08 RX ADMIN — IPRATROPIUM BROMIDE 0.5 MG: 0.5 SOLUTION RESPIRATORY (INHALATION) at 20:22

## 2024-05-08 RX ADMIN — GABAPENTIN 100 MG: 100 CAPSULE ORAL at 21:37

## 2024-05-08 RX ADMIN — ACETAMINOPHEN 650 MG: 325 TABLET, FILM COATED ORAL at 01:46

## 2024-05-08 RX ADMIN — Medication 2.5 MG: at 12:29

## 2024-05-08 RX ADMIN — PANTOPRAZOLE SODIUM 40 MG: 40 TABLET, DELAYED RELEASE ORAL at 09:09

## 2024-05-08 RX ADMIN — CEFEPIME 1000 MG: 2 INJECTION, POWDER, FOR SOLUTION INTRAVENOUS at 10:25

## 2024-05-08 RX ADMIN — CEFEPIME 1000 MG: 2 INJECTION, POWDER, FOR SOLUTION INTRAVENOUS at 21:36

## 2024-05-08 RX ADMIN — Medication 4.5 MG: at 21:36

## 2024-05-08 RX ADMIN — FUROSEMIDE 20 MG: 20 TABLET ORAL at 09:41

## 2024-05-08 RX ADMIN — BUPROPION HYDROCHLORIDE 75 MG: 75 TABLET, FILM COATED ORAL at 09:08

## 2024-05-08 RX ADMIN — METOPROLOL SUCCINATE 100 MG: 100 TABLET, EXTENDED RELEASE ORAL at 09:08

## 2024-05-08 RX ADMIN — HEPARIN SODIUM 5000 UNITS: 5000 INJECTION INTRAVENOUS; SUBCUTANEOUS at 13:07

## 2024-05-08 RX ADMIN — IPRATROPIUM BROMIDE 0.5 MG: 0.5 SOLUTION RESPIRATORY (INHALATION) at 13:29

## 2024-05-08 RX ADMIN — HEPARIN SODIUM 5000 UNITS: 5000 INJECTION INTRAVENOUS; SUBCUTANEOUS at 05:39

## 2024-05-08 NOTE — ASSESSMENT & PLAN NOTE
Chronically wears 4 L NC at baseline  Recent history of COVID-19 infection in  02/2024  CT chest Persistent multifocal consolidations may represent sequela of prior pneumonia. However, recurrent pneumonia or chronic infectious or inflammatory process is not excluded   Completed 4 days of IV ceftriaxone.  Today's update and plan:  Currently on 2 L of oxygen.  Patient has spike of fever with tachycardia.  UA did not show any significant acute UTI, procalcitonin is trending down.  Will follow CT chest without contrast to look for interval change

## 2024-05-08 NOTE — RESPIRATORY THERAPY NOTE
"RT Protocol Note  Lauren Quintero 78 y.o. female MRN: [id number]  Unit/Bed#: -01 Encounter: [id number]    Assessment    Principal Problem:    Septic shock (HCC)  Active Problems:    Ambulatory dysfunction    Essential hypertension    Acute on chronic respiratory failure with hypoxia (HCC)    Coronary artery disease of native artery of native heart with stable angina pectoris (HCC)    Major depressive disorder, recurrent episode, moderate (HCC)    COPD (chronic obstructive pulmonary disease) ([facility])    Anemia    Chronic combined systolic and diastolic CHF (congestive heart failure) ([facility])    Acute kidney injury superimposed on chronic kidney disease  ([facility])  Physical Exam:   Assessment Type: Post-treatment  General Appearance: Awake, Alert  Respiratory Pattern: Normal  Chest Assessment: Chest expansion symmetrical  Bilateral Breath Sounds: Diminished  O2 Device: nc    Vitals:  Blood pressure 128/69, pulse 104, temperature 98.1 °F (36.7 °C), resp. rate 16, height 5' 7\" (1.702 m), weight 52.3 kg (115 lb 4.8 oz), SpO2 99%, not currently breastfeeding.      O2 Device: nc     Plan    Respiratory Plan: Home Bronchodilator Patient pathway        Resp Comments: will cont with TID txs as per home regimen, pt resting comfortably no distress noted.   "

## 2024-05-08 NOTE — SPEECH THERAPY NOTE
Speech Language/Pathology     Speech/Language Pathology Progress Note     Patient Name: Lauren Quintero    Today's Date: 5/8/2024     Problem List  Principal Problem:    Septic shock ([facility])  Active Problems:    Ambulatory dysfunction    Essential hypertension    Acute on chronic respiratory failure with hypoxia (HCC)    Coronary artery disease of native artery of native heart with stable angina pectoris (HCC)    Major depressive disorder, recurrent episode, moderate (HCC)    COPD (chronic obstructive pulmonary disease) ([facility])    Anemia    Chronic combined systolic and diastolic CHF (congestive heart failure) (HCC)    Acute kidney injury superimposed on chronic kidney disease  (HCC)       Subjective:  Patient received awake, upright in bed, self-feeding lunch.     Previous/current diet: dys3/thin     Objective:  The following consistencies were tested dysphagia 3 solids, puree solids, thin liquids.   Patient presents with functional bolus containment, manipulation and control.  Mastication judged to be complete.  No increased WOB, s/s of aspiration or distress with modified textures.  O2 saturation 94%.  Appears to be managing well.  Intake is good.  Vocal quality noted to remain clear.      Assessment:  Oropharyngeal swallow function appears same from previous encounters.  Managing well on least restrictive diet     Plan:  Dysphagia 3/thin  Meds as preferred/ tolerated   Monitor respiratory status   Will continue to follow/monitor x1-3      Corine Gastelum MS, CCC-SLP  Speech-Language Pathologist  [place] #[id number]  [place] #[id number]

## 2024-05-08 NOTE — ASSESSMENT & PLAN NOTE
Present on admission, evidenced by a creatinine of 2.99 with a baseline of 0.7-1.1  Creatinine continues to trend down.  Etiology likely multifactorial, secondary to poor oral intake as well as ATN from septic shock/hypotension.  Improving  Monitor BMP daily  Continue to hold Entresto for now.  Avoid nephrotoxins, hypotension.

## 2024-05-08 NOTE — PROGRESS NOTES
[facility]  Progress Note  Name: Lauren Quintero I  MRN: [id number]  Unit/Bed#: MS 328Slime I Date of Admission: 5/2/2024   Date of Service: 5/8/2024 I Hospital Day: 6    Assessment/Plan   Acute kidney injury superimposed on chronic kidney disease  (HCC)  Assessment & Plan  Present on admission, evidenced by a creatinine of 2.99 with a baseline of 0.7-1.1  Creatinine continues to trend down.  Etiology likely multifactorial, secondary to poor oral intake as well as ATN from septic shock/hypotension.  Improving  Monitor BMP daily  Continue to hold Entresto for now.  Avoid nephrotoxins, hypotension.    Chronic combined systolic and diastolic CHF (congestive heart failure) ([facility])  Assessment & Plan  History of combined CHF with the most recent EF of 35%  Pt took off Life vest : reports she will wear at home.  Continue telemetry monitor.  GDMT includes Toprol XL, Entresto  Resume Toprol-XL, continue to hold Entresto since DARLING improving.  Monitor strict intake/output  Monitor weights    Anemia  Assessment & Plan  Noted history  EGD in 2023 Mild erosive gastritis and Duodenal bulb angiectasia status post APC  Colonoscopy in  2023 Left-sided diverticulosis with old blood clots likely indicating a diverticular bleed   Had Normal out pt video capsule endoscopy.   Currently hemoglobin stable around 8 range.  Continue to trend CBC while inpt.       COPD (chronic obstructive pulmonary disease) ([facility])  Assessment & Plan  Noted history; + smoker  Follows with Pulmonology OP  Chonically on 4L nc at home.  Takes Advair at home.  Continue nebs.  Monitor respiratory status closely    Major depressive disorder, recurrent episode, moderate ([facility])  Assessment & Plan  Takes reports patient taking multiple agents for depression  Wellbutrin, Vrylar, and Viibryd  Currently receiving Wellbutrin, other medications are non-formulary.      Coronary artery disease of native artery of native heart with stable angina pectoris  ([facility])  Assessment & Plan  Continue with home medications - Aspirin, pravastatin, metoprolol  History of Multivessel Disease as noted in 2/24; declined CABG.      Acute on chronic respiratory failure with hypoxia ([facility])  Assessment & Plan  Chronically wears 4 L NC at baseline  Recent history of COVID-19 infection in  02/2024  CT chest Persistent multifocal consolidations may represent sequela of prior pneumonia. However, recurrent pneumonia or chronic infectious or inflammatory process is not excluded   Completed 4 days of IV ceftriaxone.  Today's update and plan:  Currently on 2 L of oxygen.  Patient has spike of fever with tachycardia.  UA did not show any significant acute UTI, procalcitonin is trending down.  Will follow CT chest without contrast to look for interval change    Essential hypertension  Assessment & Plan  Patient does have history of hypertension, takes Toprol XL/Entresto at home.  She was noted to have hypotension on admission, secondary to septic shock, therefore these medications are currently on hold.    Shock is now resolved.  BP stable  Toprol XL  Hold Entresto for now due to acute kidney injury improving  Monitor vital signs closely    Ambulatory dysfunction  Assessment & Plan  Uses a walker for assistance at baseline  PT/OT following      * Septic shock ([facility])  Assessment & Plan  Patient presented to the ER with lethargy and fevers for several days.  Met SIRS criteria on admission, evidenced by tachycardia, tachypnea, leukocytosis with an elevated procalcitonin  Patient also with septic shock criteria, evidenced by bandemia, DARLING with hypotension; requiring ICU admission for pressors  She was able to receive 2.5 L fluid bolus in the ER, but due to chronic combined HF with an EF of 35%, unable to give patient further fluid boluses.  2/2 blood cultures growing pansensitive E. coli.    Shock has now resolved.  Sepsis likely secondary to pyelonephritis.  Current hemodynamically stable.  Etiology of  sepsis appears to be multifactorial - evidenced by pyelonephritis and pneumonia as seen on CT imaging.  MRSA Negative, strep pneumoniae/legionella negative.  Initially was given cefepime and vancomycin.  Subsequently completed 4 days of IV ceftriaxone.  Transitioned to p.o. amoxicillin 500 mg twice daily given currently rates less than 30: Increase p.o. amoxicillin to 1000 mg twice daily once creatinine clearance improves more than 30.  Antibiotic course through 2024 for total 10-day course  Follow-up with final culture sensitivity.  Today's update and plan:  Patient has mildly elevated WBCs with no tachycardia, hypotension.  Eating and drinking fine  Continue with amoxicillin for now               VTE Pharmacologic Prophylaxis: VTE Score: 3 Moderate Risk (Score 3-4) - Pharmacological DVT Prophylaxis Ordered: heparin.    Mobility:   Basic Mobility Inpatient Raw Score: 14  JH-HLM Goal: 4: Move to chair/commode  JH-HLM Achieved: 4: Move to chair/commode  JH-HLM Goal NOT achieved. Continue with multidisciplinary rounding and encourage appropriate mobility to improve upon JH-HLM goals.    Patient Centered Rounds: I performed bedside rounds with nursing staff today.   Discussions with Specialists or Other Care Team Provider: Care manager    Education and Discussions with Family / Patient: Updated  (son) via phone.      Current Length of Stay: 6 day(s)  Current Patient Status: Inpatient   Certification Statement: The patient will continue to require additional inpatient hospital stay due to spike of fever, tachycardia  Discharge Plan: Anticipate discharge in 24-48 hrs to rehab facility.    Code Status: Level 1 - Full Code    Subjective:   Patient had a spike of fever with episodes of tachycardia.  She is still on 2 L of oxygen.    Objective:     Vitals:   Temp (24hrs), Av.8 °F (37.1 °C), Min:97.9 °F (36.6 °C), Max:100.9 °F (38.3 °C)    Temp:  [97.9 °F (36.6 °C)-100.9 °F (38.3 °C)] 98 °F (36.7  °C)  HR:  [104-121] 106  Resp:  [14-20] 17  BP: ()/(55-76) 101/62  SpO2:  [92 %-99 %] 94 %  Body mass index is 18.06 kg/m².     Input and Output Summary (last 24 hours):     Intake/Output Summary (Last 24 hours) at 5/8/2024 1404  Last data filed at 5/8/2024 1329  Gross per 24 hour   Intake 587 ml   Output 1550 ml   Net -963 ml       Physical Exam:   Constitutional: No acute distress  HEENT: Pallor or icterus negative  CVS: S1 plus S2  Respiratory: Bilateral occasional crackles on the bases  Gastroenterology: Soft nontender without any palpable mass  Skin: No bruises or ecchymosis  Neurology: No focal logical deficit      Additional Data:     Labs:  Results from last 7 days   Lab Units 05/08/24  0834 05/06/24  0519 05/05/24  0549 05/04/24  0452 05/03/24  0550   WBC Thousand/uL 11.54*   < > 12.84*   < > 18.40*   HEMOGLOBIN g/dL 8.3*   < > 9.6*   < > 8.0*   HEMATOCRIT % 26.2*   < > 30.9*   < > 25.8*   PLATELETS Thousands/uL 149   < > 129*   < > 120*   BANDS PCT %  --   --   --   --  8   SEGS PCT %  --   --  88*  --   --    LYMPHO PCT %  --   --  4*  --  6*   MONO PCT %  --   --  6  --  1*   EOS PCT %  --   --  0  --  0    < > = values in this interval not displayed.     Results from last 7 days   Lab Units 05/08/24  0834 05/04/24  0452 05/03/24  0550   SODIUM mmol/L 140   < > 140   POTASSIUM mmol/L 3.6   < > 4.4   CHLORIDE mmol/L 104   < > 109*   CO2 mmol/L 31   < > 24   BUN mg/dL 36*   < > 55*   CREATININE mg/dL 1.45*   < > 2.59*   ANION GAP mmol/L 5   < > 7   CALCIUM mg/dL 8.5   < > 7.8*   ALBUMIN g/dL  --   --  2.8*   TOTAL BILIRUBIN mg/dL  --   --  0.39   ALK PHOS U/L  --   --  63   ALT U/L  --   --  3*   AST U/L  --   --  13   GLUCOSE RANDOM mg/dL 113   < > 139    < > = values in this interval not displayed.     Results from last 7 days   Lab Units 05/03/24  0550   INR  1.31*     Results from last 7 days   Lab Units 05/05/24  1521 05/02/24  2301 05/02/24  1909   POC GLUCOSE mg/dl 128 171* 146*          Results from last 7 days   Lab Units 05/08/24  0834 05/07/24  0524 05/05/24  0549 05/04/24  0452 05/03/24  0550 05/02/24 1924   LACTIC ACID mmol/L  --   --   --   --   --  1.2   PROCALCITONIN ng/ml 4.15* 8.11* 38.59* 79.62* 141.27* 225.38*       Lines/Drains:  Invasive Devices       Peripheral Intravenous Line  Duration             Peripheral IV 05/05/24 Left Antecubital 3 days                      Telemetry:  Telemetry Orders (From admission, onward)               LifeVest Patient: Continuous Telemetry Monitoring during hospitalization (non-expiring)  Continuous LifeVest Telemetry Monitoring        References:    LifeVest Policy        LifeVest Patient: Continuous Telemetry Monitoring during hospitalization (non-expiring)  (Lifevest Evaluation for removal/continue)  Continuous LifeVest Telemetry Monitoring        References:    LifeVest Policy                     Telemetry Reviewed: Normal Sinus Rhythm  Indication for Continued Telemetry Use: No indication for continued use. Will discontinue.              Imaging: No pertinent imaging reviewed.    Recent Cultures (last 7 days):   Results from last 7 days   Lab Units 05/03/24  0005 05/02/24  1944 05/02/24  1925 05/02/24 1924   BLOOD CULTURE   --   --  Escherichia coli* Escherichia coli*   GRAM STAIN RESULT   --   --  Gram negative rods* Gram negative rods*   URINE CULTURE   --  >100,000 cfu/ml Escherichia coli*  50,000-59,000 cfu/ml Beta Hemolytic Streptococcus Group B*  --   --    LEGIONELLA URINARY ANTIGEN  Negative  --   --   --        Last 24 Hours Medication List:   Current Facility-Administered Medications   Medication Dose Route Frequency Provider Last Rate    acetaminophen  650 mg Oral Q6H PRN Allison Daiunfidelina Julian, DO      albuterol  2.5 mg Nebulization Q4H PRN Allison Julian, DO      amLODIPine  10 mg Oral Daily Allison Julian, DO      aspirin  81 mg Oral Daily Allison Daiunfidelina Julian, DO      budesonide-formoterol  2 puff  Inhalation BID Clayton Ritchie MD      buPROPion  75 mg Oral Daily Allison Julian DO      cefepime  1,000 mg Intravenous Q12H Clayton Ritchie MD 1,000 mg (05/08/24 1025)    furosemide  20 mg Oral Daily Clayton Ritchie MD      gabapentin  100 mg Oral HS Allison Julian DO      heparin (porcine)  5,000 Units Subcutaneous Q8H ROSELINE Allison Julian DO      ipratropium  0.5 mg Nebulization TID Allison Julian DO      melatonin  4.5 mg Oral HS Allison Julian DO      metoprolol succinate  100 mg Oral Daily Allison Julian DO      oxyCODONE  2.5 mg Oral Q6H PRN Clayton Ritchie MD      pantoprazole  40 mg Oral Daily Allison Julian DO          Today, Patient Was Seen By: Clayton Ritchie MD    **Please Note: This note may have been constructed using a voice recognition system.**

## 2024-05-08 NOTE — RESPIRATORY THERAPY NOTE
RT Protocol Note  Lauren Quintero 78 y.o. female MRN: [id number]  Unit/Bed#: -01 Encounter: [id number]    Assessment    Principal Problem:    Septic shock ([facility])  Active Problems:    Ambulatory dysfunction    Essential hypertension    Acute on chronic respiratory failure with hypoxia ([facility])    Coronary artery disease of native artery of native heart with stable angina pectoris ([facility])    Major depressive disorder, recurrent episode, moderate (HCC)    COPD (chronic obstructive pulmonary disease) ([facility])    Anemia    Chronic combined systolic and diastolic CHF (congestive heart failure) ([facility])    Acute kidney injury superimposed on chronic kidney disease  ([facility])      Home Pulmonary Medications:     05/07/24 2016   Respiratory Protocol   Protocol Initiated? No   Protocol Selection Respiratory   Language Barrier? No   Medical & Social History Reviewed? Yes   Diagnostic Studies Reviewed? Yes   Physical Assessment Performed? Yes   Home Devices/Therapy Home O2   Respiratory Plan Home Bronchodilator Patient pathway   Respiratory Assessment   Assessment Type During-treatment   General Appearance Alert;Awake   Respiratory Pattern Normal   Chest Assessment Chest expansion symmetrical   Bilateral Breath Sounds Diminished   Cough None   Resp Comments Will continue with current tx plan   O2 Device NC   Cough Description   Sputum Amount None   Additional Assessments   Pulse (!) 116   Respirations 18   SpO2 94 %       Home Devices/Therapy: Home O2    Past Medical History:   Diagnosis Date    Acute congestive heart failure (HCC) 08/27/2021    Anxiety     Cardiac disease     Chest pain 08/27/2021    Chronic pain disorder     COPD (chronic obstructive pulmonary disease) ([facility])     COVID-19 02/15/2024    Depression     Heart disease     Hyperlipidemia     Hypertension     MI (myocardial infarction) ([facility])     MRSA (methicillin resistant Staphylococcus aureus)     Renal disorder     benign kidney tumor     Social History     Socioeconomic  History    Marital status:      Spouse name: None    Number of children: 3    Years of education: 13    Highest education level: High school graduate   Occupational History    Occupation:      Employer: [employer]     Comment: retired    Tobacco Use    Smoking status: Former     Current packs/day: 0.00     Average packs/day: 1 pack/day for 60.0 years (60.0 ttl pk-yrs)     Types: Cigarettes     Start date:      Quit date: 2016     Years since quittin.3    Smokeless tobacco: Never    Tobacco comments:     She has close to a 60 pack-year smoking history, most recently has cut down to 4-5 cigarettes daily   Vaping Use    Vaping status: Never Used   Substance and Sexual Activity    Alcohol use: Never    Drug use: No    Sexual activity: Not Currently   Other Topics Concern    None   Social History Narrative    None     Social Determinants of Health     Financial Resource Strain: Patient Declined (2024)    Received from [facility]    Overall Financial Resource Strain (CARDIA)     Difficulty of Paying Living Expenses: Patient declined   Food Insecurity: No Food Insecurity (5/3/2024)    Hunger Vital Sign     Worried About Running Out of Food in the Last Year: Never true     Ran Out of Food in the Last Year: Never true   Transportation Needs: No Transportation Needs (5/3/2024)    PRAPARE - Transportation     Lack of Transportation (Medical): No     Lack of Transportation (Non-Medical): No   Physical Activity: Patient Declined (2024)    Received from [facility]    Exercise Vital Sign     Days of Exercise per Week: Patient declined     Minutes of Exercise per Session: Patient declined   Stress: Patient Declined (2024)    Received from [facility]    [employer] [place] [employer] - Occupational Stress Questionnaire     Feeling of Stress : Patient declined   Social Connections: Patient Declined (2024)    Received from [employer]     "Social Connection and Isolation Panel [NHANES]     Frequency of Communication with Friends and Family: Patient declined     Frequency of Social Gatherings with Friends and Family: Patient declined     Attends [employer] Services: Patient declined     Active Member of Clubs or Organizations: Patient declined     Attends Club or Organization Meetings: Patient declined     Marital Status: Patient declined   Intimate Partner Violence: Patient Declined (1/6/2024)    Received from [facility]    Humiliation, Afraid, Rape, and Kick questionnaire     Fear of Current or Ex-Partner: Patient declined     Emotionally Abused: Patient declined     Physically Abused: Patient declined     Sexually Abused: Patient declined   Housing Stability: Low Risk  (5/3/2024)    Housing Stability Vital Sign     Unable to Pay for Housing in the Last Year: No     Number of Places Lived in the Last Year: 1     Unstable Housing in the Last Year: No       Subjective         Objective    Physical Exam:   Assessment Type: During-treatment  General Appearance: Alert, Awake  Respiratory Pattern: Normal  Chest Assessment: Chest expansion symmetrical  Bilateral Breath Sounds: Diminished  Cough: None  O2 Device: NC    Vitals:  Blood pressure 124/76, pulse (!) 116, temperature 100 °F (37.8 °C), resp. rate 18, height 5' 7\" (1.702 m), weight 51.7 kg (113 lb 15.7 oz), SpO2 94%, not currently breastfeeding.          Imaging and other studies: I have personally reviewed pertinent reports.      O2 Device: NC     Plan    Respiratory Plan: Home Bronchodilator Patient pathway        Resp Comments: Will continue with current tx plan   "

## 2024-05-08 NOTE — PLAN OF CARE
Dysphagia 3/thin  Meds as preferred/ tolerated   Monitor respiratory status   Will continue to follow/monitor x1-3

## 2024-05-09 LAB
ANION GAP SERPL CALCULATED.3IONS-SCNC: 6 MMOL/L (ref 4–13)
BUN SERPL-MCNC: 42 MG/DL (ref 5–25)
CALCIUM SERPL-MCNC: 8.7 MG/DL (ref 8.4–10.2)
CHLORIDE SERPL-SCNC: 101 MMOL/L (ref 96–108)
CO2 SERPL-SCNC: 30 MMOL/L (ref 21–32)
CREAT SERPL-MCNC: 1.66 MG/DL (ref 0.6–1.3)
ERYTHROCYTE [DISTWIDTH] IN BLOOD BY AUTOMATED COUNT: 15.8 % (ref 11.6–15.1)
GFR SERPL CREATININE-BSD FRML MDRD: 29 ML/MIN/1.73SQ M
GLUCOSE SERPL-MCNC: 114 MG/DL (ref 65–140)
HCT VFR BLD AUTO: 26.5 % (ref 34.8–46.1)
HGB BLD-MCNC: 8.3 G/DL (ref 11.5–15.4)
MCH RBC QN AUTO: 29.9 PG (ref 26.8–34.3)
MCHC RBC AUTO-ENTMCNC: 31.3 G/DL (ref 31.4–37.4)
MCV RBC AUTO: 95 FL (ref 82–98)
PLATELET # BLD AUTO: 208 THOUSANDS/UL (ref 149–390)
PMV BLD AUTO: 10.7 FL (ref 8.9–12.7)
POTASSIUM SERPL-SCNC: 3.9 MMOL/L (ref 3.5–5.3)
RBC # BLD AUTO: 2.78 MILLION/UL (ref 3.81–5.12)
SODIUM SERPL-SCNC: 137 MMOL/L (ref 135–147)
WBC # BLD AUTO: 12.93 THOUSAND/UL (ref 4.31–10.16)

## 2024-05-09 PROCEDURE — 94760 N-INVAS EAR/PLS OXIMETRY 1: CPT

## 2024-05-09 PROCEDURE — 80048 BASIC METABOLIC PNL TOTAL CA: CPT | Performed by: STUDENT IN AN ORGANIZED HEALTH CARE EDUCATION/TRAINING PROGRAM

## 2024-05-09 PROCEDURE — 94664 DEMO&/EVAL PT USE INHALER: CPT

## 2024-05-09 PROCEDURE — 99233 SBSQ HOSP IP/OBS HIGH 50: CPT | Performed by: STUDENT IN AN ORGANIZED HEALTH CARE EDUCATION/TRAINING PROGRAM

## 2024-05-09 PROCEDURE — 94640 AIRWAY INHALATION TREATMENT: CPT

## 2024-05-09 PROCEDURE — 85027 COMPLETE CBC AUTOMATED: CPT | Performed by: STUDENT IN AN ORGANIZED HEALTH CARE EDUCATION/TRAINING PROGRAM

## 2024-05-09 RX ADMIN — GABAPENTIN 100 MG: 100 CAPSULE ORAL at 21:05

## 2024-05-09 RX ADMIN — BUDESONIDE AND FORMOTEROL FUMARATE DIHYDRATE 2 PUFF: 80; 4.5 AEROSOL RESPIRATORY (INHALATION) at 09:43

## 2024-05-09 RX ADMIN — Medication 2.5 MG: at 14:12

## 2024-05-09 RX ADMIN — HEPARIN SODIUM 5000 UNITS: 5000 INJECTION INTRAVENOUS; SUBCUTANEOUS at 06:01

## 2024-05-09 RX ADMIN — PANTOPRAZOLE SODIUM 40 MG: 40 TABLET, DELAYED RELEASE ORAL at 09:38

## 2024-05-09 RX ADMIN — BUPROPION HYDROCHLORIDE 75 MG: 75 TABLET, FILM COATED ORAL at 09:38

## 2024-05-09 RX ADMIN — IPRATROPIUM BROMIDE 0.5 MG: 0.5 SOLUTION RESPIRATORY (INHALATION) at 19:38

## 2024-05-09 RX ADMIN — Medication 2.5 MG: at 06:20

## 2024-05-09 RX ADMIN — CEFEPIME 1000 MG: 2 INJECTION, POWDER, FOR SOLUTION INTRAVENOUS at 21:05

## 2024-05-09 RX ADMIN — AMLODIPINE BESYLATE 10 MG: 10 TABLET ORAL at 09:42

## 2024-05-09 RX ADMIN — IPRATROPIUM BROMIDE 0.5 MG: 0.5 SOLUTION RESPIRATORY (INHALATION) at 07:09

## 2024-05-09 RX ADMIN — IPRATROPIUM BROMIDE 0.5 MG: 0.5 SOLUTION RESPIRATORY (INHALATION) at 14:03

## 2024-05-09 RX ADMIN — Medication 2.5 MG: at 21:13

## 2024-05-09 RX ADMIN — HEPARIN SODIUM 5000 UNITS: 5000 INJECTION INTRAVENOUS; SUBCUTANEOUS at 21:05

## 2024-05-09 RX ADMIN — Medication 4.5 MG: at 21:05

## 2024-05-09 RX ADMIN — FUROSEMIDE 20 MG: 20 TABLET ORAL at 09:38

## 2024-05-09 RX ADMIN — METOPROLOL SUCCINATE 100 MG: 100 TABLET, EXTENDED RELEASE ORAL at 09:41

## 2024-05-09 RX ADMIN — HEPARIN SODIUM 5000 UNITS: 5000 INJECTION INTRAVENOUS; SUBCUTANEOUS at 14:12

## 2024-05-09 RX ADMIN — BUDESONIDE AND FORMOTEROL FUMARATE DIHYDRATE 2 PUFF: 80; 4.5 AEROSOL RESPIRATORY (INHALATION) at 17:54

## 2024-05-09 RX ADMIN — CEFEPIME 1000 MG: 2 INJECTION, POWDER, FOR SOLUTION INTRAVENOUS at 10:00

## 2024-05-09 RX ADMIN — ASPIRIN 81 MG: 81 TABLET, COATED ORAL at 09:38

## 2024-05-09 NOTE — RESPIRATORY THERAPY NOTE
RT Protocol Note  Lauren Quintero 78 y.o. female MRN: [id number]  Unit/Bed#: -01 Encounter: [id number]    Assessment    Principal Problem:    Septic shock ([facility])  Active Problems:    Ambulatory dysfunction    Essential hypertension    Acute on chronic respiratory failure with hypoxia ([facility])    Coronary artery disease of native artery of native heart with stable angina pectoris ([facility])    Major depressive disorder, recurrent episode, moderate ([facility])    COPD (chronic obstructive pulmonary disease) ([facility])    Anemia    Chronic combined systolic and diastolic CHF (congestive heart failure) ([facility])    Acute kidney injury superimposed on chronic kidney disease  ([facility])      Home Pulmonary Medications:     05/09/24 0710   Respiratory Protocol   Protocol Initiated? No   Protocol Selection Respiratory   Language Barrier? No   Medical & Social History Reviewed? Yes   Diagnostic Studies Reviewed? Yes   Physical Assessment Performed? Yes   Home Devices/Therapy Home O2   Respiratory Plan Home Bronchodilator Patient pathway   Respiratory Assessment   Assessment Type Pre-treatment   General Appearance Alert;Awake   Respiratory Pattern Normal   Chest Assessment Chest expansion symmetrical   Bilateral Breath Sounds Diminished   Resp Comments continue with home regimen   O2 Device nc   Additional Assessments   SpO2 96 %       Home Devices/Therapy: Home O2    Past Medical History:   Diagnosis Date    Acute congestive heart failure (HCC) 08/27/2021    Anxiety     Cardiac disease     Chest pain 08/27/2021    Chronic pain disorder     COPD (chronic obstructive pulmonary disease) ([facility])     COVID-19 02/15/2024    Depression     Heart disease     Hyperlipidemia     Hypertension     MI (myocardial infarction) ([facility])     MRSA (methicillin resistant Staphylococcus aureus)     Renal disorder     benign kidney tumor     Social History     Socioeconomic History    Marital status:      Spouse name: None    Number of children: 3    Years of  education: 13    Highest education level: High school graduate   Occupational History    Occupation:      Employer: [employer]     Comment: retired    Tobacco Use    Smoking status: Former     Current packs/day: 0.00     Average packs/day: 1 pack/day for 60.0 years (60.0 ttl pk-yrs)     Types: Cigarettes     Start date:      Quit date: 2016     Years since quittin.3    Smokeless tobacco: Never    Tobacco comments:     She has close to a 60 pack-year smoking history, most recently has cut down to 4-5 cigarettes daily   Vaping Use    Vaping status: Never Used   Substance and Sexual Activity    Alcohol use: Never    Drug use: No    Sexual activity: Not Currently   Other Topics Concern    None   Social History Narrative    None     Social Determinants of Health     Financial Resource Strain: Patient Declined (2024)    Received from [facility]    Overall Financial Resource Strain (CARDIA)     Difficulty of Paying Living Expenses: Patient declined   Food Insecurity: No Food Insecurity (5/3/2024)    Hunger Vital Sign     Worried About Running Out of Food in the Last Year: Never true     Ran Out of Food in the Last Year: Never true   Transportation Needs: No Transportation Needs (5/3/2024)    PRAPARE - Transportation     Lack of Transportation (Medical): No     Lack of Transportation (Non-Medical): No   Physical Activity: Patient Declined (2024)    Received from [facility]    Exercise Vital Sign     Days of Exercise per Week: Patient declined     Minutes of Exercise per Session: Patient declined   Stress: Patient Declined (2024)    Received from [facility]    [employer] - Occupational Stress Questionnaire     Feeling of Stress : Patient declined   Social Connections: Patient Declined (2024)    Received from [facility]    Social Connection and Isolation Panel [NHANES]     Frequency of Communication with Friends and  "Family: Patient declined     Frequency of Social Gatherings with Friends and Family: Patient declined     Attends Church Services: Patient declined     Active Member of Clubs or Organizations: Patient declined     Attends Club or Organization Meetings: Patient declined     Marital Status: Patient declined   Intimate Partner Violence: Patient Declined (1/6/2024)    Received from [facility]    Humiliation, Afraid, Rape, and Kick questionnaire     Fear of Current or Ex-Partner: Patient declined     Emotionally Abused: Patient declined     Physically Abused: Patient declined     Sexually Abused: Patient declined   Housing Stability: Low Risk  (5/3/2024)    Housing Stability Vital Sign     Unable to Pay for Housing in the Last Year: No     Number of Places Lived in the Last Year: 1     Unstable Housing in the Last Year: No       Subjective         Objective    Physical Exam:   Assessment Type: Pre-treatment  General Appearance: Alert, Awake  Respiratory Pattern: Normal  Chest Assessment: Chest expansion symmetrical  Bilateral Breath Sounds: Diminished  O2 Device: nc    Vitals:  Blood pressure 120/70, pulse (!) 113, temperature 98.9 °F (37.2 °C), resp. rate 18, height 5' 7\" (1.702 m), weight 51.6 kg (113 lb 12.1 oz), SpO2 (!) 89%, not currently breastfeeding.          Imaging and other studies: I have personally reviewed pertinent reports.      O2 Device: nc     Plan    Respiratory Plan: Home Bronchodilator Patient pathway        Resp Comments: continue with home regimen   "

## 2024-05-09 NOTE — ASSESSMENT & PLAN NOTE
Present on admission, evidenced by a creatinine of 2.99 with a baseline of 0.7-1.1  Etiology likely multifactorial, secondary to poor oral intake as well as ATN from septic shock/hypotension.  current creatinine is 1.6  Monitor BMP daily  Continue to hold Entresto for now.  Avoid nephrotoxins, hypotension.

## 2024-05-09 NOTE — CASE MANAGEMENT
Case Management Discharge Planning Note    Patient name Lauren Quintero  Location /-01 MRN [id number]  : 1945 Date 2024       Current Admission Date: 2024  Current Admission Diagnosis:Septic shock ([facility])   Patient Active Problem List    Diagnosis Date Noted    Acute kidney injury superimposed on chronic kidney disease  ([facility]) 2024    Septic shock ([facility]) 2024    DARLING (acute kidney injury) ([facility]) 2024    Diarrhea 2024    Pain 04/15/2024    Loose stools 2024    Insomnia 2024    Non-ST elevation MI (NSTEMI) ([facility]) 2024    Abnormal chest x-ray 2024    Chronic combined systolic and diastolic CHF (congestive heart failure) ([facility]) 2024    Elevated troponin 2024    CKD (chronic kidney disease) 2024    Dizziness 2023    Social problem 2023    Diverticulitis 10/29/2023    Moderate protein-calorie malnutrition ([facility]) 2023    BRBPR (bright red blood per rectum) 2023    Generalized weakness 2023    Staring episodes 2023    Waldenstrom macroglobulinemia ([facility]) 2023    Severe protein-calorie malnutrition ([facility]) 2021    Positive blood culture 2021    COPD (chronic obstructive pulmonary disease) ([facility]) 2021    Anemia 2021    Abnormal computed tomography angiography (CTA) 2021    Fatigue 2021    Major depressive disorder, recurrent episode, moderate ([facility]) 2019    Flank pain 2019    Coronary artery disease of native artery of native heart with stable angina pectoris ([facility]) 2018    Acute on chronic respiratory failure with hypoxia ([facility]) 2018    Ambulatory dysfunction 2018    Essential hypertension 2018    Tobacco abuse 2018      LOS (days): 7  Geometric Mean LOS (GMLOS) (days): 2.6  Days to GMLOS:-4.2     OBJECTIVE:  Risk of Unplanned Readmission Score: 45.01         Current admission status: Inpatient   Preferred Pharmacy:   [id number] [place] - [address]  [address]  [place] [address]  Phone: [phone] Fax: [phone]    [facility] [place] - [address]  [address]  [address]  [place] [address]  Phone: [phone] Fax: [phone]    [place], [place] - [address]  [address] [facility] [address]  Phone: [phone] Fax: [phone]    [facility] - [place], [place] - [address]  [address] [facility] [place] [address]  Phone: [phone] Fax: [phone]    Primary Care Provider: Starla Bateman MD    Primary Insurance: MEDICARE  Secondary Insurance:     DISCHARGE DETAILS:  Patients son is agreeable to giving patients medication Zanubrutinib 80mg to rehab as rehab will not cover cost.     Imer Quintero (Son)   [phone] (H)

## 2024-05-09 NOTE — ASSESSMENT & PLAN NOTE
Patient does have history of hypertension, takes Toprol XL/Entresto at home.  She was noted to have hypotension on admission, secondary to septic shock, therefore these medications are currently on hold.    Shock is now resolved.  BP stable  Toprol XL amlodipine and Lasix 20 mg daily  Hold Entresto for now due to acute kidney injury improving

## 2024-05-09 NOTE — ASSESSMENT & PLAN NOTE
Patient presented to the ER with lethargy and fevers for several days.  Met SIRS criteria on admission, evidenced by tachycardia, tachypnea, leukocytosis with an elevated procalcitonin  Patient also with septic shock criteria, evidenced by bandemia, DARLING with hypotension; requiring ICU admission for pressors  She was able to receive 2.5 L fluid bolus in the ER, but due to chronic combined HF with an EF of 35%, unable to give patient further fluid boluses.  2/2 blood cultures growing pansensitive E. coli.    Shock has now resolved.  Sepsis likely secondary to pyelonephritis.  Current hemodynamically stable.  Etiology of sepsis appears to be multifactorial - evidenced by pyelonephritis and pneumonia as seen on CT imaging.  MRSA Negative, strep pneumoniae/legionella negative.  Initially was given cefepime and vancomycin.  Subsequently completed 4 days of IV ceftriaxone.  Transitioned to p.o. amoxicillin 500 mg twice daily given currently rates less than 30: Increase p.o. amoxicillin to 1000 mg twice daily once creatinine clearance improves more than 30.  Antibiotic course through 5/11/2024 for total 10-day course  Follow-up with final culture sensitivity.  Today's update and plan:  Patient has mildly elevated WBCs with no tachycardia, hypotension.  Eating and drinking fine  Hold amoxicillin and started cefepime as patient started having fever with tachycardia and CT chest without contrast showed some and new infiltrate

## 2024-05-09 NOTE — CASE MANAGEMENT
Case Management Discharge Planning Note    Patient name Lauren Quintero  Location /-01 MRN [id number]  : 1945 Date 2024       Current Admission Date: 2024  Current Admission Diagnosis:Septic shock ([facility])   Patient Active Problem List    Diagnosis Date Noted    Acute kidney injury superimposed on chronic kidney disease  ([facility]) 2024    Septic shock ([facility]) 2024    DARLING (acute kidney injury) ([facility]) 2024    Diarrhea 2024    Pain 04/15/2024    Loose stools 2024    Insomnia 2024    Non-ST elevation MI (NSTEMI) ([facility]) 2024    Abnormal chest x-ray 2024    Chronic combined systolic and diastolic CHF (congestive heart failure) ([facility]) 2024    Elevated troponin 2024    CKD (chronic kidney disease) 2024    Dizziness 2023    Social problem 2023    Diverticulitis 10/29/2023    Moderate protein-calorie malnutrition ([facility]) 2023    BRBPR (bright red blood per rectum) 2023    Generalized weakness 2023    Staring episodes 2023    Waldenstrom macroglobulinemia ([facility]) 2023    Severe protein-calorie malnutrition ([facility]) 2021    Positive blood culture 2021    COPD (chronic obstructive pulmonary disease) ([facility]) 2021    Anemia 2021    Abnormal computed tomography angiography (CTA) 2021    Fatigue 2021    Major depressive disorder, recurrent episode, moderate ([facility]) 2019    Flank pain 2019    Coronary artery disease of native artery of native heart with stable angina pectoris ([facility]) 2018    Acute on chronic respiratory failure with hypoxia ([facility]) 2018    Ambulatory dysfunction 2018    Essential hypertension 2018    Tobacco abuse 2018      LOS (days): 7  Geometric Mean LOS (GMLOS) (days): 2.6  Days to GMLOS:-3.9     OBJECTIVE:  Risk of Unplanned Readmission Score: 45.18         Current admission status: Inpatient   Preferred Pharmacy:   [id number] [place] - [address]  [address]  [facility] [place] [address]  Phone: [phone] Fax: [phone]    [facility] [place] - [address]  [address]  [address]  [place] [address]  Phone: [phone] Fax: [phone]    [place], [place] - [address] [facility]  [address] [facility] [address]  Phone: [phone] Fax: [phone]    [facility] - [place], [place] - [address]  [address] [facility] [place] [address]  Phone: [phone] Fax: [phone]    Primary Care Provider: Starla Bateman MD    Primary Insurance: MEDICARE  Secondary Insurance:     DISCHARGE DETAILS:     IMM Given (Date):: 05/08/24  IMM Given to:: Patient (CM reviewed IMM with patient at bedside. Patient reported understanding their rights and denied any questions or concerns Patient was given a copy and signed copy was placed in medical records.)

## 2024-05-09 NOTE — ASSESSMENT & PLAN NOTE
Noted history  EGD in 2023 Mild erosive gastritis and Duodenal bulb angiectasia status post APC  Colonoscopy in  2023 Left-sided diverticulosis with old blood clots likely indicating a diverticular bleed   Had Normal out pt video capsule endoscopy.   Continue to trend CBC while inpt.

## 2024-05-09 NOTE — PLAN OF CARE
Problem: Potential for Falls  Goal: Patient will remain free of falls  Description: INTERVENTIONS:  - Educate patient/family on patient safety including physical limitations  - Instruct patient to call for assistance with activity   - Consult OT/PT to assist with strengthening/mobility   - Keep Call bell within reach  - Keep bed low and locked with side rails adjusted as appropriate  - Keep care items and personal belongings within reach  - Initiate and maintain comfort rounds  - Make Fall Risk Sign visible to staff  - Offer Toileting every 2 Hours, in advance of need  - Initiate/Maintain alarm  - Obtain necessary fall risk management equipment  - Apply yellow socks and bracelet for high fall risk patients  - Consider moving patient to room near nurses station  Outcome: Progressing     Problem: SAFETY ADULT  Goal: Patient will remain free of falls  Description: INTERVENTIONS:  - Educate patient/family on patient safety including physical limitations  - Instruct patient to call for assistance with activity   - Consult OT/PT to assist with strengthening/mobility   - Keep Call bell within reach  - Keep bed low and locked with side rails adjusted as appropriate  - Keep care items and personal belongings within reach  - Initiate and maintain comfort rounds  - Make Fall Risk Sign visible to staff  - Offer Toileting every 2 Hours, in advance of need  - Initiate/Maintain alarm  - Obtain necessary fall risk management equipment  - Apply yellow socks and bracelet for high fall risk patients  - Consider moving patient to room near nurses station  Outcome: Progressing

## 2024-05-09 NOTE — PROGRESS NOTES
[facility]  Progress Note  Name: Lauren Quintero I  MRN: [id number]  Unit/Bed#: MS 328Slime I Date of Admission: 5/2/2024   Date of Service: 5/9/2024 I Hospital Day: 7    Assessment/Plan   Acute kidney injury superimposed on chronic kidney disease  (HCC)  Assessment & Plan  Present on admission, evidenced by a creatinine of 2.99 with a baseline of 0.7-1.1  Etiology likely multifactorial, secondary to poor oral intake as well as ATN from septic shock/hypotension.  current creatinine is 1.6  Monitor BMP daily  Continue to hold Entresto for now.  Avoid nephrotoxins, hypotension.    Chronic combined systolic and diastolic CHF (congestive heart failure) ([facility])  Assessment & Plan  History of combined CHF with the most recent EF of 35%  Pt took off Life vest : reports she will wear at home.  Continue telemetry monitor.  GDMT includes Toprol XL, Entresto  Resume Toprol-XL, continue to hold Entresto since DARLING improving.  Monitor strict intake/output  Monitor weights    Anemia  Assessment & Plan  Noted history  EGD in 2023 Mild erosive gastritis and Duodenal bulb angiectasia status post APC  Colonoscopy in  2023 Left-sided diverticulosis with old blood clots likely indicating a diverticular bleed   Had Normal out pt video capsule endoscopy.   Continue to trend CBC while inpt.       COPD (chronic obstructive pulmonary disease) ([facility])  Assessment & Plan  Noted history; + smoker  Follows with Pulmonology OP  Chonically on 4L nc at home.  Takes Advair at home.  Continue nebs.  Monitor respiratory status closely    Major depressive disorder, recurrent episode, moderate ([facility])  Assessment & Plan  Takes reports patient taking multiple agents for depression  Discussed with the family on phone to bring these medication to the hospital including Vrylar, and Viibryd  Currently receiving Wellbutrin, other medications are non-formulary.      Coronary artery disease of native artery of native heart with stable angina  pectoris (HCC)  Assessment & Plan  Continue with home medications - Aspirin, pravastatin, metoprolol  History of Multivessel Disease as noted in 2/24; declined CABG.      Acute on chronic respiratory failure with hypoxia (HCC)  Assessment & Plan  Chronically wears 4 L NC at baseline  Recent history of COVID-19 infection in  02/2024  CT chest Persistent multifocal consolidations may represent sequela of prior pneumonia. However, recurrent pneumonia or chronic infectious or inflammatory process is not excluded   Completed 4 days of IV ceftriaxone.  Today's update and plan:  Currently on 2 L of oxygen.  Patient does not have any spike of fever.  Mild tachycardic.  Continue with cefepime as CT chest without contrast showed some suspected pneumonia    Essential hypertension  Assessment & Plan  Patient does have history of hypertension, takes Toprol XL/Entresto at home.  She was noted to have hypotension on admission, secondary to septic shock, therefore these medications are currently on hold.    Shock is now resolved.  BP stable  Toprol XL amlodipine and Lasix 20 mg daily  Hold Entresto for now due to acute kidney injury improving    Ambulatory dysfunction  Assessment & Plan  Uses a walker for assistance at baseline  PT/OT following      * Septic shock (HCC)  Assessment & Plan  Patient presented to the ER with lethargy and fevers for several days.  Met SIRS criteria on admission, evidenced by tachycardia, tachypnea, leukocytosis with an elevated procalcitonin  Patient also with septic shock criteria, evidenced by bandemia, DARLING with hypotension; requiring ICU admission for pressors  She was able to receive 2.5 L fluid bolus in the ER, but due to chronic combined HF with an EF of 35%, unable to give patient further fluid boluses.  2/2 blood cultures growing pansensitive E. coli.    Shock has now resolved.  Sepsis likely secondary to pyelonephritis.  Current hemodynamically stable.  Etiology of sepsis appears to be  multifactorial - evidenced by pyelonephritis and pneumonia as seen on CT imaging.  MRSA Negative, strep pneumoniae/legionella negative.  Initially was given cefepime and vancomycin.  Subsequently completed 4 days of IV ceftriaxone.  Transitioned to p.o. amoxicillin 500 mg twice daily given currently rates less than 30: Increase p.o. amoxicillin to 1000 mg twice daily once creatinine clearance improves more than 30.  Antibiotic course through 5/11/2024 for total 10-day course  Follow-up with final culture sensitivity.  Today's update and plan:  Patient has mildly elevated WBCs with no tachycardia, hypotension.  Eating and drinking fine  Hold amoxicillin and started cefepime as patient started having fever with tachycardia and CT chest without contrast showed some and new infiltrate               VTE Pharmacologic Prophylaxis: VTE Score: 3 Moderate Risk (Score 3-4) - Pharmacological DVT Prophylaxis Ordered: heparin.    Mobility:   Basic Mobility Inpatient Raw Score: 14  -HLM Goal: 4: Move to chair/commode  JH-HLM Achieved: 4: Move to chair/commode  JH-HLM Goal NOT achieved. Continue with multidisciplinary rounding and encourage appropriate mobility to improve upon JH-HLM goals.    Patient Centered Rounds: I performed bedside rounds with nursing staff today.   Discussions with Specialists or Other Care Team Provider: Care manager    Education and Discussions with Family / Patient: Updated  (son) via phone.        Current Length of Stay: 7 day(s)  Current Patient Status: Inpatient   Certification Statement: The patient will continue to require additional inpatient hospital stay due to requiring IV antibiotic for possible pneumonia  Discharge Plan: Anticipate discharge in 24-48 hrs to rehab facility.    Code Status: Level 1 - Full Code    Subjective:   Patient is feeling fatigued.  Currently on 2 L of oxygen mild tachycardic.  She is eating and drinking better than before    Objective:     Vitals:   Temp  (24hrs), Av.7 °F (37.1 °C), Min:98.1 °F (36.7 °C), Max:99.7 °F (37.6 °C)    Temp:  [98.1 °F (36.7 °C)-99.7 °F (37.6 °C)] 98.5 °F (36.9 °C)  HR:  [100-119] 103  Resp:  [14-19] 18  BP: (107-131)/(54-70) 107/54  SpO2:  [89 %-98 %] 94 %  Body mass index is 17.82 kg/m².     Input and Output Summary (last 24 hours):     Intake/Output Summary (Last 24 hours) at 2024 1336  Last data filed at 2024 1156  Gross per 24 hour   Intake 720 ml   Output 1848 ml   Net -1128 ml       Physical Exam:   Constitutional: Ill-appearing  HEENT: Pallor or icterus negative  CVS: S1 plus S2  Respiratory: Occasional crackles  Gastroenterology: Soft nontender without any palpable mass  Skin: No bruises or ecchymosis  Neurology: No focal logical deficit      Additional Data:     Labs:  Results from last 7 days   Lab Units 24  0616 24  0519 24  0549 24  04524  0550   WBC Thousand/uL 12.93*   < > 12.84*   < > 18.40*   HEMOGLOBIN g/dL 8.3*   < > 9.6*   < > 8.0*   HEMATOCRIT % 26.5*   < > 30.9*   < > 25.8*   PLATELETS Thousands/uL 208   < > 129*   < > 120*   BANDS PCT %  --   --   --   --  8   SEGS PCT %  --   --  88*  --   --    LYMPHO PCT %  --   --  4*  --  6*   MONO PCT %  --   --  6  --  1*   EOS PCT %  --   --  0  --  0    < > = values in this interval not displayed.     Results from last 7 days   Lab Units 24  0616 24  0550   SODIUM mmol/L 137   < > 140   POTASSIUM mmol/L 3.9   < > 4.4   CHLORIDE mmol/L 101   < > 109*   CO2 mmol/L 30   < > 24   BUN mg/dL 42*   < > 55*   CREATININE mg/dL 1.66*   < > 2.59*   ANION GAP mmol/L 6   < > 7   CALCIUM mg/dL 8.7   < > 7.8*   ALBUMIN g/dL  --   --  2.8*   TOTAL BILIRUBIN mg/dL  --   --  0.39   ALK PHOS U/L  --   --  63   ALT U/L  --   --  3*   AST U/L  --   --  13   GLUCOSE RANDOM mg/dL 114   < > 139    < > = values in this interval not displayed.     Results from last 7 days   Lab Units 24  0550   INR  1.31*     Results from  last 7 days   Lab Units 05/05/24  1521 05/02/24  2301 05/02/24  1909   POC GLUCOSE mg/dl 128 171* 146*         Results from last 7 days   Lab Units 05/08/24  0834 05/07/24  0524 05/05/24  0549 05/04/24  0452 05/03/24  0550 05/02/24  1924   LACTIC ACID mmol/L  --   --   --   --   --  1.2   PROCALCITONIN ng/ml 4.15* 8.11* 38.59* 79.62* 141.27* 225.38*       Lines/Drains:  Invasive Devices       Peripheral Intravenous Line  Duration             Peripheral IV 05/09/24 Dorsal (posterior);Right Forearm <1 day                      Telemetry:  Telemetry Orders (From admission, onward)               LifeVest Patient: Continuous Telemetry Monitoring during hospitalization (non-expiring)  Continuous LifeVest Telemetry Monitoring        References:    LifeVest Policy        LifeVest Patient: Continuous Telemetry Monitoring during hospitalization (non-expiring)  (Lifevest Evaluation for removal/continue)  Continuous LifeVest Telemetry Monitoring        References:    LifeVest Policy                     Telemetry Reviewed: Sinus Tachycardia  Indication for Continued Telemetry Use: No indication for continued use. Will discontinue.              Imaging: Personally reviewed the following imaging: chest CT scan showed some evolving infiltrate could be atelectasis or pneumonia    Recent Cultures (last 7 days):   Results from last 7 days   Lab Units 05/08/24  0859 05/03/24  0005 05/02/24  1944 05/02/24  1925 05/02/24  1924   BLOOD CULTURE  Received in Microbiology Lab. Culture in Progress.  Received in Microbiology Lab. Culture in Progress.  --   --  Escherichia coli* Escherichia coli*   GRAM STAIN RESULT   --   --   --  Gram negative rods* Gram negative rods*   URINE CULTURE   --   --  >100,000 cfu/ml Escherichia coli*  50,000-59,000 cfu/ml Beta Hemolytic Streptococcus Group B*  --   --    LEGIONELLA URINARY ANTIGEN   --  Negative  --   --   --        Last 24 Hours Medication List:   Current Facility-Administered Medications    Medication Dose Route Frequency Provider Last Rate    acetaminophen  650 mg Oral Q6H PRN Allison Julian,       albuterol  2.5 mg Nebulization Q4H PRN Allison Julian DO      amLODIPine  10 mg Oral Daily Allison Julian,       aspirin  81 mg Oral Daily Allison Julian DO      budesonide-formoterol  2 puff Inhalation BID Clayton Ritchie MD      buPROPion  75 mg Oral Daily Allison Julian DO      cefepime  1,000 mg Intravenous Q12H Clayton Ritchie MD 1,000 mg (05/09/24 1000)    gabapentin  100 mg Oral HS Allison Julian DO      heparin (porcine)  5,000 Units Subcutaneous Q8H ROSELINE Allison Julian DO      ipratropium  0.5 mg Nebulization TID Allison Julian DO      melatonin  4.5 mg Oral HS Allison Julian DO      metoprolol succinate  100 mg Oral Daily Allison Julian DO      oxyCODONE  2.5 mg Oral Q6H PRN Clayton Ritchie MD      pantoprazole  40 mg Oral Daily Allison Julian DO          Today, Patient Was Seen By: Clayton Ritchie MD    **Please Note: This note may have been constructed using a voice recognition system.**

## 2024-05-09 NOTE — ASSESSMENT & PLAN NOTE
Takes reports patient taking multiple agents for depression  Discussed with the family on phone to bring these medication to the hospital including Vrylar, and Viibryd  Currently receiving Wellbutrin, other medications are non-formulary.

## 2024-05-09 NOTE — RESPIRATORY THERAPY NOTE
RT Protocol Note  Lauren Quintero 78 y.o. female MRN: [id number]  Unit/Bed#: -01 Encounter: [id number]    Assessment    Principal Problem:    Septic shock ([facility])  Active Problems:    Ambulatory dysfunction    Essential hypertension    Acute on chronic respiratory failure with hypoxia ([facility])    Coronary artery disease of native artery of native heart with stable angina pectoris ([facility])    Major depressive disorder, recurrent episode, moderate (HCC)    COPD (chronic obstructive pulmonary disease) ([facility])    Anemia    Chronic combined systolic and diastolic CHF (congestive heart failure) ([facility])    Acute kidney injury superimposed on chronic kidney disease  ([facility])      Home Pulmonary Medications:  Atrovent tid  Home Devices/Therapy: Home O2    Past Medical History:   Diagnosis Date    Acute congestive heart failure ([facility]) 2021    Anxiety     Cardiac disease     Chest pain 2021    Chronic pain disorder     COPD (chronic obstructive pulmonary disease) ([facility])     COVID-19 02/15/2024    Depression     Heart disease     Hyperlipidemia     Hypertension     MI (myocardial infarction) ([facility])     MRSA (methicillin resistant Staphylococcus aureus)     Renal disorder     benign kidney tumor     Social History     Socioeconomic History    Marital status:      Spouse name: None    Number of children: 3    Years of education: 13    Highest education level: High school graduate   Occupational History    Occupation:      Employer: [employer]     Comment: retired    Tobacco Use    Smoking status: Former     Current packs/day: 0.00     Average packs/day: 1 pack/day for 60.0 years (60.0 ttl pk-yrs)     Types: Cigarettes     Start date:      Quit date: 2016     Years since quittin.3    Smokeless tobacco: Never    Tobacco comments:     She has close to a 60 pack-year smoking history, most recently has cut down to 4-5 cigarettes daily   Vaping Use    Vaping status: Never Used   Substance and  Sexual Activity    Alcohol use: Never    Drug use: No    Sexual activity: Not Currently   Other Topics Concern    None   Social History Narrative    None     Social Determinants of Health     Financial Resource Strain: Patient Declined (1/6/2024)    Received from [facility]    Overall Financial Resource Strain (CARDIA)     Difficulty of Paying Living Expenses: Patient declined   Food Insecurity: No Food Insecurity (5/3/2024)    Hunger Vital Sign     Worried About Running Out of Food in the Last Year: Never true     Ran Out of Food in the Last Year: Never true   Transportation Needs: No Transportation Needs (5/3/2024)    PRAPARE - Transportation     Lack of Transportation (Medical): No     Lack of Transportation (Non-Medical): No   Physical Activity: Patient Declined (1/6/2024)    Received from [facility]    Exercise Vital Sign     Days of Exercise per Week: Patient declined     Minutes of Exercise per Session: Patient declined   Stress: Patient Declined (1/6/2024)    Received from [facility]    [place] [employer] - Occupational Stress Questionnaire     Feeling of Stress : Patient declined   Social Connections: Patient Declined (1/6/2024)    Received from [facility]    Social Connection and Isolation Panel [NHANES]     Frequency of Communication with Friends and Family: Patient declined     Frequency of Social Gatherings with Friends and Family: Patient declined     Attends Mormonism Services: Patient declined     Active Member of Clubs or Organizations: Patient declined     Attends Club or Organization Meetings: Patient declined     Marital Status: Patient declined   Intimate Partner Violence: Patient Declined (1/6/2024)    Received from [facility]    Humiliation, Afraid, Rape, and Kick questionnaire     Fear of Current or Ex-Partner: Patient declined     Emotionally Abused: Patient declined     Physically Abused: Patient declined     Sexually Abused: Patient  "declined   Housing Stability: Low Risk  (5/3/2024)    Housing Stability Vital Sign     Unable to Pay for Housing in the Last Year: No     Number of Places Lived in the Last Year: 1     Unstable Housing in the Last Year: No       Subjective         Objective    Physical Exam:        Vitals:  Blood pressure 121/69, pulse (!) 109, temperature 99.1 °F (37.3 °C), resp. rate 19, height 5' 7\" (1.702 m), weight 52.3 kg (115 lb 4.8 oz), SpO2 95%, not currently breastfeeding.          Imaging and other studies:     O2 Device: nc     Plan    Respiratory Plan: Home Bronchodilator Patient pathway        Resp Comments: patient admitted with septic shock takes atrovent TID at [facility]. Will continue with current tx orders as per patient's home regimen.   "

## 2024-05-09 NOTE — RESPIRATORY THERAPY NOTE
RT Protocol Note  Lauren Quintero 78 y.o. female MRN: [id number]  Unit/Bed#: -01 Encounter: [id number]    Assessment    Principal Problem:    Septic shock ([facility])  Active Problems:    Ambulatory dysfunction    Essential hypertension    Acute on chronic respiratory failure with hypoxia ([facility])    Coronary artery disease of native artery of native heart with stable angina pectoris ([facility])    Major depressive disorder, recurrent episode, moderate (HCC)    COPD (chronic obstructive pulmonary disease) ([facility])    Anemia    Chronic combined systolic and diastolic CHF (congestive heart failure) ([facility])    Acute kidney injury superimposed on chronic kidney disease  ([facility])      Home Pulmonary Medications:     05/09/24 1940   Respiratory Protocol   Protocol Initiated? No   Protocol Selection Respiratory   Language Barrier? No   Medical & Social History Reviewed? Yes   Diagnostic Studies Reviewed? Yes   Physical Assessment Performed? Yes   Home Devices/Therapy Home O2   Respiratory Plan Home Bronchodilator Patient pathway   Respiratory Assessment   Assessment Type During-treatment   General Appearance Alert;Awake   Respiratory Pattern Normal   Chest Assessment Chest expansion symmetrical   Bilateral Breath Sounds Diminished   Cough None   Resp Comments Will continue with current tx plan   O2 Device NC   Additional Assessments   Pulse 102   Respirations 18   SpO2 98 %       Home Devices/Therapy: Home O2    Past Medical History:   Diagnosis Date    Acute congestive heart failure (HCC) 08/27/2021    Anxiety     Cardiac disease     Chest pain 08/27/2021    Chronic pain disorder     COPD (chronic obstructive pulmonary disease) ([facility])     COVID-19 02/15/2024    Depression     Heart disease     Hyperlipidemia     Hypertension     MI (myocardial infarction) ([facility])     MRSA (methicillin resistant Staphylococcus aureus)     Renal disorder     benign kidney tumor     Social History     Socioeconomic History    Marital status:      Spouse  name: None    Number of children: 3    Years of education: 13    Highest education level: High school graduate   Occupational History    Occupation:      Employer: [employer]     Comment: retired    Tobacco Use    Smoking status: Former     Current packs/day: 0.00     Average packs/day: 1 pack/day for 60.0 years (60.0 ttl pk-yrs)     Types: Cigarettes     Start date:      Quit date: 2016     Years since quittin.3    Smokeless tobacco: Never    Tobacco comments:     She has close to a 60 pack-year smoking history, most recently has cut down to 4-5 cigarettes daily   Vaping Use    Vaping status: Never Used   Substance and Sexual Activity    Alcohol use: Never    Drug use: No    Sexual activity: Not Currently   Other Topics Concern    None   Social History Narrative    None     Social Determinants of Health     Financial Resource Strain: Patient Declined (2024)    Received from [facility]    Overall Financial Resource Strain (CARDIA)     Difficulty of Paying Living Expenses: Patient declined   Food Insecurity: No Food Insecurity (5/3/2024)    Hunger Vital Sign     Worried About Running Out of Food in the Last Year: Never true     Ran Out of Food in the Last Year: Never true   Transportation Needs: No Transportation Needs (5/3/2024)    PRAPARE - Transportation     Lack of Transportation (Medical): No     Lack of Transportation (Non-Medical): No   Physical Activity: Patient Declined (2024)    Received from [facility]    Exercise Vital Sign     Days of Exercise per Week: Patient declined     Minutes of Exercise per Session: Patient declined   Stress: Patient Declined (2024)    Received from [facility]    [place] [employer] - Occupational Stress Questionnaire     Feeling of Stress : Patient declined   Social Connections: Patient Declined (2024)    Received from [facility]    Social Connection and Isolation Panel [NHANES]     " Frequency of Communication with Friends and Family: Patient declined     Frequency of Social Gatherings with Friends and Family: Patient declined     Attends Amish Services: Patient declined     Active Member of Clubs or Organizations: Patient declined     Attends Club or Organization Meetings: Patient declined     Marital Status: Patient declined   Intimate Partner Violence: Patient Declined (1/6/2024)    Received from [facility]    Humiliation, Afraid, Rape, and Kick questionnaire     Fear of Current or Ex-Partner: Patient declined     Emotionally Abused: Patient declined     Physically Abused: Patient declined     Sexually Abused: Patient declined   Housing Stability: Low Risk  (5/3/2024)    Housing Stability Vital Sign     Unable to Pay for Housing in the Last Year: No     Number of Places Lived in the Last Year: 1     Unstable Housing in the Last Year: No       Subjective         Objective    Physical Exam:   Assessment Type: During-treatment  General Appearance: Alert, Awake  Respiratory Pattern: Normal  Chest Assessment: Chest expansion symmetrical  Bilateral Breath Sounds: Diminished  Cough: None  O2 Device: NC    Vitals:  Blood pressure 113/69, pulse 102, temperature 98.1 °F (36.7 °C), resp. rate 18, height 5' 7\" (1.702 m), weight 51.6 kg (113 lb 12.1 oz), SpO2 98%, not currently breastfeeding.          Imaging and other studies: I have personally reviewed pertinent reports.      O2 Device: NC     Plan    Respiratory Plan: Home Bronchodilator Patient pathway        Resp Comments: Will continue with current tx plan   "

## 2024-05-10 LAB
ANION GAP SERPL CALCULATED.3IONS-SCNC: 5 MMOL/L (ref 4–13)
ANION GAP SERPL CALCULATED.3IONS-SCNC: 5 MMOL/L (ref 4–13)
BUN SERPL-MCNC: 43 MG/DL (ref 5–25)
BUN SERPL-MCNC: 44 MG/DL (ref 5–25)
CALCIUM SERPL-MCNC: 8.5 MG/DL (ref 8.4–10.2)
CALCIUM SERPL-MCNC: 8.7 MG/DL (ref 8.4–10.2)
CHLORIDE SERPL-SCNC: 102 MMOL/L (ref 96–108)
CHLORIDE SERPL-SCNC: 102 MMOL/L (ref 96–108)
CO2 SERPL-SCNC: 30 MMOL/L (ref 21–32)
CO2 SERPL-SCNC: 31 MMOL/L (ref 21–32)
CREAT SERPL-MCNC: 1.67 MG/DL (ref 0.6–1.3)
CREAT SERPL-MCNC: 1.75 MG/DL (ref 0.6–1.3)
ERYTHROCYTE [DISTWIDTH] IN BLOOD BY AUTOMATED COUNT: 15.9 % (ref 11.6–15.1)
GFR SERPL CREATININE-BSD FRML MDRD: 27 ML/MIN/1.73SQ M
GFR SERPL CREATININE-BSD FRML MDRD: 29 ML/MIN/1.73SQ M
GLUCOSE SERPL-MCNC: 102 MG/DL (ref 65–140)
GLUCOSE SERPL-MCNC: 130 MG/DL (ref 65–140)
HCT VFR BLD AUTO: 26.5 % (ref 34.8–46.1)
HGB BLD-MCNC: 7.9 G/DL (ref 11.5–15.4)
MCH RBC QN AUTO: 29.2 PG (ref 26.8–34.3)
MCHC RBC AUTO-ENTMCNC: 29.8 G/DL (ref 31.4–37.4)
MCV RBC AUTO: 98 FL (ref 82–98)
PLATELET # BLD AUTO: 200 THOUSANDS/UL (ref 149–390)
PMV BLD AUTO: 10 FL (ref 8.9–12.7)
POTASSIUM SERPL-SCNC: 4 MMOL/L (ref 3.5–5.3)
POTASSIUM SERPL-SCNC: 4.1 MMOL/L (ref 3.5–5.3)
RBC # BLD AUTO: 2.71 MILLION/UL (ref 3.81–5.12)
SODIUM SERPL-SCNC: 137 MMOL/L (ref 135–147)
SODIUM SERPL-SCNC: 138 MMOL/L (ref 135–147)
WBC # BLD AUTO: 10.5 THOUSAND/UL (ref 4.31–10.16)

## 2024-05-10 PROCEDURE — 80048 BASIC METABOLIC PNL TOTAL CA: CPT | Performed by: STUDENT IN AN ORGANIZED HEALTH CARE EDUCATION/TRAINING PROGRAM

## 2024-05-10 PROCEDURE — 94664 DEMO&/EVAL PT USE INHALER: CPT

## 2024-05-10 PROCEDURE — 99232 SBSQ HOSP IP/OBS MODERATE 35: CPT | Performed by: STUDENT IN AN ORGANIZED HEALTH CARE EDUCATION/TRAINING PROGRAM

## 2024-05-10 PROCEDURE — 97535 SELF CARE MNGMENT TRAINING: CPT

## 2024-05-10 PROCEDURE — 94760 N-INVAS EAR/PLS OXIMETRY 1: CPT

## 2024-05-10 PROCEDURE — 94640 AIRWAY INHALATION TREATMENT: CPT

## 2024-05-10 PROCEDURE — 85027 COMPLETE CBC AUTOMATED: CPT | Performed by: STUDENT IN AN ORGANIZED HEALTH CARE EDUCATION/TRAINING PROGRAM

## 2024-05-10 RX ORDER — VILAZODONE HYDROCHLORIDE 40 MG/1
40 TABLET ORAL DAILY
Status: DISCONTINUED | OUTPATIENT
Start: 2024-05-10 | End: 2024-05-15 | Stop reason: HOSPADM

## 2024-05-10 RX ADMIN — HEPARIN SODIUM 5000 UNITS: 5000 INJECTION INTRAVENOUS; SUBCUTANEOUS at 06:29

## 2024-05-10 RX ADMIN — ACETAMINOPHEN 650 MG: 325 TABLET, FILM COATED ORAL at 10:12

## 2024-05-10 RX ADMIN — IPRATROPIUM BROMIDE 0.5 MG: 0.5 SOLUTION RESPIRATORY (INHALATION) at 13:20

## 2024-05-10 RX ADMIN — Medication 2.5 MG: at 12:45

## 2024-05-10 RX ADMIN — BUDESONIDE AND FORMOTEROL FUMARATE DIHYDRATE 2 PUFF: 80; 4.5 AEROSOL RESPIRATORY (INHALATION) at 17:42

## 2024-05-10 RX ADMIN — BUDESONIDE AND FORMOTEROL FUMARATE DIHYDRATE 2 PUFF: 80; 4.5 AEROSOL RESPIRATORY (INHALATION) at 10:13

## 2024-05-10 RX ADMIN — ACETAMINOPHEN 650 MG: 325 TABLET, FILM COATED ORAL at 17:46

## 2024-05-10 RX ADMIN — Medication 2.5 MG: at 18:42

## 2024-05-10 RX ADMIN — GABAPENTIN 100 MG: 100 CAPSULE ORAL at 21:27

## 2024-05-10 RX ADMIN — IPRATROPIUM BROMIDE 0.5 MG: 0.5 SOLUTION RESPIRATORY (INHALATION) at 19:40

## 2024-05-10 RX ADMIN — HEPARIN SODIUM 5000 UNITS: 5000 INJECTION INTRAVENOUS; SUBCUTANEOUS at 21:27

## 2024-05-10 RX ADMIN — CEFEPIME 1000 MG: 2 INJECTION, POWDER, FOR SOLUTION INTRAVENOUS at 20:35

## 2024-05-10 RX ADMIN — SODIUM CHLORIDE 500 ML: 0.9 INJECTION, SOLUTION INTRAVENOUS at 10:13

## 2024-05-10 RX ADMIN — CEFEPIME 1000 MG: 2 INJECTION, POWDER, FOR SOLUTION INTRAVENOUS at 10:12

## 2024-05-10 RX ADMIN — BUPROPION HYDROCHLORIDE 75 MG: 75 TABLET, FILM COATED ORAL at 10:12

## 2024-05-10 RX ADMIN — CARIPRAZINE 1.5 MG: 1.5 CAPSULE, GELATIN COATED ORAL at 12:34

## 2024-05-10 RX ADMIN — VILAZODONE HYDROCHLORIDE 40 MG: 40 TABLET, FILM COATED ORAL at 12:34

## 2024-05-10 RX ADMIN — ASPIRIN 81 MG: 81 TABLET, COATED ORAL at 10:11

## 2024-05-10 RX ADMIN — Medication 4.5 MG: at 21:27

## 2024-05-10 RX ADMIN — PANTOPRAZOLE SODIUM 40 MG: 40 TABLET, DELAYED RELEASE ORAL at 10:11

## 2024-05-10 NOTE — ASSESSMENT & PLAN NOTE
Resolved  Patient presented to the ER with lethargy and fevers for several days.  Met SIRS criteria on admission, evidenced by tachycardia, tachypnea, leukocytosis with an elevated procalcitonin  Patient also with septic shock criteria, evidenced by bandemia, DARLING with hypotension; requiring ICU admission for pressors  She was able to receive 2.5 L fluid bolus in the ER, but due to chronic combined HF with an EF of 35%, unable to give patient further fluid boluses.  2/2 blood cultures growing pansensitive E. coli.    Shock has now resolved.  Sepsis likely secondary to pyelonephritis.  Current hemodynamically stable.  Etiology of sepsis appears to be multifactorial - evidenced by pyelonephritis and pneumonia as seen on CT imaging.  MRSA Negative, strep pneumoniae/legionella negative.  Initially was given cefepime and vancomycin.  Subsequently completed 4 days of IV ceftriaxone.  Transitioned to p.o. amoxicillin 500 mg twice daily given currently rates less than 30: Increase p.o. amoxicillin to 1000 mg twice daily once creatinine clearance improves more than 30.  Antibiotic course through 5/11/2024 for total 10-day course  Follow-up with final culture sensitivity.  Today's update and plan:  Patient has mildly elevated WBCs with no tachycardia, hypotension.  Eating and drinking fine  Hold amoxicillin and started cefepime as patient started having fever with tachycardia and CT chest without contrast showed some and new infiltrate

## 2024-05-10 NOTE — ASSESSMENT & PLAN NOTE
Takes reports patient taking multiple agents for depression  Started Vrylar, and Viibryd  Currently receiving Wellbutrin, other medications are non-formulary.

## 2024-05-10 NOTE — RESPIRATORY THERAPY NOTE
05/10/24 1940   Respiratory Protocol   Protocol Initiated? No   Protocol Selection Respiratory   Language Barrier? No   Medical & Social History Reviewed? Yes   Diagnostic Studies Reviewed? Yes   Physical Assessment Performed? Yes   Home Devices/Therapy Home O2   Respiratory Plan No distress/Pulmonary history;Home Bronchodilator Patient pathway   Respiratory Assessment   General Appearance Awake   Respiratory Pattern Normal   Chest Assessment Chest expansion symmetrical   Bilateral Breath Sounds Clear   Cough None   Resp Comments quietly watching tv, even non labored respirations   O2 Device 5L o2 nc   Additional Assessments   SpO2 95 %     Continue as ordered per home regimen

## 2024-05-10 NOTE — PROGRESS NOTES
[facility]  Progress Note  Name: Lauren Quintero I  MRN: [id number]  Unit/Bed#: -[facility] I Date of Admission: 5/2/2024   Date of Service: 5/10/2024 I Hospital Day: 8    Assessment/Plan   Acute kidney injury superimposed on chronic kidney disease  (HCC)  Assessment & Plan  Present on admission, evidenced by a creatinine of 2.99 with a baseline of 0.7-1.1  Etiology likely multifactorial, secondary to poor oral intake as well as ATN from septic shock/hypotension.  Patient's creatinine has bumped up  250 mL of bolus given repeat BMP today to see interval change  Avoid nephrotoxic medications    Chronic combined systolic and diastolic CHF (congestive heart failure) ([facility])  Assessment & Plan  History of combined CHF with the most recent EF of 35%  Pt took off Life vest : reports she will wear at home.  Continue telemetry monitor.  GDMT includes Toprol XL, Entresto  Resume Toprol-XL, continue to hold Entresto since DARLING improving.  Monitor strict intake/output  Monitor weights    Anemia  Assessment & Plan  Noted history  EGD in 2023 Mild erosive gastritis and Duodenal bulb angiectasia status post APC  Colonoscopy in  2023 Left-sided diverticulosis with old blood clots likely indicating a diverticular bleed   Had Normal out pt video capsule endoscopy.   Continue to trend CBC while inpt.       COPD (chronic obstructive pulmonary disease) ([facility])  Assessment & Plan  Noted history; + smoker  Follows with Pulmonology OP  Chonically on 4L nc at home.  Takes Advair at home.  Continue nebs.  Monitor respiratory status closely    Major depressive disorder, recurrent episode, moderate ([facility])  Assessment & Plan  Takes reports patient taking multiple agents for depression  Started Vrylar, and Viibryd  Currently receiving Wellbutrin, other medications are non-formulary.      Coronary artery disease of native artery of native heart with stable angina pectoris ([facility])  Assessment & Plan  Continue with home medications -  Aspirin, pravastatin, metoprolol  History of Multivessel Disease as noted in 2/24; declined CABG.      Acute on chronic respiratory failure with hypoxia (HCC)  Assessment & Plan  Chronically wears 4 L NC at baseline  Recent history of COVID-19 infection in  02/2024  CT chest Persistent multifocal consolidations may represent sequela of prior pneumonia. However, recurrent pneumonia or chronic infectious or inflammatory process is not excluded   Completed 4 days of IV ceftriaxone.  Today's update and plan:  Patient is doing good.  Patient can be discharged on levofloxacin 750 mg every 48 hourly for total of 7 days.  She is currently on 2 to 3 L of oxygen saturation more than 92%.    Essential hypertension  Assessment & Plan  Patient does have history of hypertension, takes Toprol XL/Entresto at home.  She was noted to have hypotension on admission, secondary to septic shock, therefore these medications are currently on hold.    Shock is now resolved.  BP stable  Toprol XL amlodipine and Lasix 20 mg daily  Hold Entresto for now due to acute kidney injury improving    Ambulatory dysfunction  Assessment & Plan  Uses a walker for assistance at baseline  PT/OT following      * Septic shock (HCC)  Assessment & Plan  Resolved  Patient presented to the ER with lethargy and fevers for several days.  Met SIRS criteria on admission, evidenced by tachycardia, tachypnea, leukocytosis with an elevated procalcitonin  Patient also with septic shock criteria, evidenced by bandemia, DARLING with hypotension; requiring ICU admission for pressors  She was able to receive 2.5 L fluid bolus in the ER, but due to chronic combined HF with an EF of 35%, unable to give patient further fluid boluses.  2/2 blood cultures growing pansensitive E. coli.    Shock has now resolved.  Sepsis likely secondary to pyelonephritis.  Current hemodynamically stable.  Etiology of sepsis appears to be multifactorial - evidenced by pyelonephritis and pneumonia as  seen on CT imaging.  MRSA Negative, strep pneumoniae/legionella negative.  Initially was given cefepime and vancomycin.  Subsequently completed 4 days of IV ceftriaxone.  Transitioned to p.o. amoxicillin 500 mg twice daily given currently rates less than 30: Increase p.o. amoxicillin to 1000 mg twice daily once creatinine clearance improves more than 30.  Antibiotic course through 2024 for total 10-day course  Follow-up with final culture sensitivity.  Today's update and plan:  Patient has mildly elevated WBCs with no tachycardia, hypotension.  Eating and drinking fine  Hold amoxicillin and started cefepime as patient started having fever with tachycardia and CT chest without contrast showed some and new infiltrate             VTE Pharmacologic Prophylaxis: VTE Score: 3 Moderate Risk (Score 3-4) - Pharmacological DVT Prophylaxis Ordered: heparin.    Mobility:   Basic Mobility Inpatient Raw Score: 14  -[facility] Goal: 4: Move to chair/commode  JH-HLM Achieved: 3: Sit at edge of bed  JH-HLM Goal NOT achieved. Continue with multidisciplinary rounding and encourage appropriate mobility to improve upon -[facility] goals.    Patient Centered Rounds: I performed bedside rounds with nursing staff today.   Discussions with Specialists or Other Care Team Provider: Care manager    Education and Discussions with Family / Patient: Updated son yesterday regarding plan    .    Current Length of Stay: 8 day(s)  Current Patient Status: Inpatient   Certification Statement: The patient will continue to require additional inpatient hospital stay due to IV antibiotics for suspected pneumonia  Discharge Plan: Anticipate discharge in 24-48 hrs to rehab facility.    Code Status: Level 1 - Full Code    Subjective:   Patient endorsed that she is feeling better.  Her cough improved    Objective:     Vitals:   Temp (24hrs), Av.2 °F (36.8 °C), Min:97.9 °F (36.6 °C), Max:98.4 °F (36.9 °C)    Temp:  [97.9 °F (36.6 °C)-98.4 °F (36.9 °C)] 97.9 °F  (36.6 °C)  HR:  [] 101  Resp:  [16-18] 17  BP: (100-113)/(53-69) 103/55  SpO2:  [93 %-99 %] 97 %  Body mass index is 17.71 kg/m².     Input and Output Summary (last 24 hours):     Intake/Output Summary (Last 24 hours) at 5/10/2024 1338  Last data filed at 5/10/2024 1201  Gross per 24 hour   Intake 720 ml   Output 1296 ml   Net -576 ml       Physical Exam:   Constitutional: No negative acute distress  HEENT: Pallor or icterus  CVS: S1 plus S2  Respiratory: Normal vascular breathe without crackles and wheeze  Gastroenterology: Soft nontender without any palpable mass  Skin: No bruises or ecchymosis  Neurology: No focal logical deficit      Additional Data:     Labs:  Results from last 7 days   Lab Units 05/10/24  0456 05/06/24  0519 05/05/24  0549   WBC Thousand/uL 10.50*   < > 12.84*   HEMOGLOBIN g/dL 7.9*   < > 9.6*   HEMATOCRIT % 26.5*   < > 30.9*   PLATELETS Thousands/uL 200   < > 129*   SEGS PCT %  --   --  88*   LYMPHO PCT %  --   --  4*   MONO PCT %  --   --  6   EOS PCT %  --   --  0    < > = values in this interval not displayed.     Results from last 7 days   Lab Units 05/10/24  0456   SODIUM mmol/L 138   POTASSIUM mmol/L 4.1   CHLORIDE mmol/L 102   CO2 mmol/L 31   BUN mg/dL 43*   CREATININE mg/dL 1.75*   ANION GAP mmol/L 5   CALCIUM mg/dL 8.7   GLUCOSE RANDOM mg/dL 102         Results from last 7 days   Lab Units 05/05/24  1521   POC GLUCOSE mg/dl 128         Results from last 7 days   Lab Units 05/08/24  0834 05/07/24  0524 05/05/24  0549 05/04/24  0452   PROCALCITONIN ng/ml 4.15* 8.11* 38.59* 79.62*       Lines/Drains:  Invasive Devices       Peripheral Intravenous Line  Duration             Peripheral IV 05/09/24 Dorsal (posterior);Right Forearm 1 day              Drain  Duration             External Urinary Catheter 1 day                      Telemetry:  Telemetry Orders (From admission, onward)               LifeVest Patient: Continuous Telemetry Monitoring during hospitalization (non-expiring)   Continuous LifeVest Telemetry Monitoring        References:    LifeVest Policy        LifeVest Patient: Continuous Telemetry Monitoring during hospitalization (non-expiring)  (Lifevest Evaluation for removal/continue)  Continuous LifeVest Telemetry Monitoring        References:    LifeVest Policy                     Telemetry Reviewed: Normal Sinus Rhythm  Indication for Continued Telemetry Use: No indication for continued use. Will discontinue.              Imaging: No pertinent imaging reviewed.    Recent Cultures (last 7 days):   Results from last 7 days   Lab Units 05/08/24  0859   BLOOD CULTURE  No Growth at 24 hrs.  No Growth at 24 hrs.       Last 24 Hours Medication List:   Current Facility-Administered Medications   Medication Dose Route Frequency Provider Last Rate    acetaminophen  650 mg Oral Q6H PRN Allison Julian, DO      albuterol  2.5 mg Nebulization Q4H PRN Allison Julian, DO      amLODIPine  10 mg Oral Daily Allison Julian, DO      aspirin  81 mg Oral Daily Allison Julian, DO      budesonide-formoterol  2 puff Inhalation BID Clayton Ritchie MD      buPROPion  75 mg Oral Daily Allison Julian, DO      cariprazine  1.5 mg Oral Daily Clayton Ritchie MD      cefepime  1,000 mg Intravenous Q12H Clayton Ritchie MD 1,000 mg (05/10/24 1012)    gabapentin  100 mg Oral HS Allison Julian, DO      heparin (porcine)  5,000 Units Subcutaneous Q8H ROSELINE Allison Julian, DO      ipratropium  0.5 mg Nebulization TID Allison Julian, DO      melatonin  4.5 mg Oral HS Allison Julian, DO      metoprolol succinate  100 mg Oral Daily Allison Julian, DO      oxyCODONE  2.5 mg Oral Q6H PRN Clayton Ritchie MD      pantoprazole  40 mg Oral Daily Allison Julian, DO      vilazodone  40 mg Oral Daily Clayton Ritchie MD          Today, Patient Was Seen By: Clayton Ritchie MD    **Please Note: This note may have been constructed using a voice  recognition system.**

## 2024-05-10 NOTE — PLAN OF CARE
Problem: Potential for Falls  Goal: Patient will remain free of falls  Description: INTERVENTIONS:  - Educate patient/family on patient safety including physical limitations  - Instruct patient to call for assistance with activity   - Consult OT/PT to assist with strengthening/mobility   - Keep Call bell within reach  - Keep bed low and locked with side rails adjusted as appropriate  - Keep care items and personal belongings within reach  - Initiate and maintain comfort rounds  - Make Fall Risk Sign visible to staff  - Offer Toileting every 2 Hours, in advance of need  - Initiate/Maintain alarm  - Obtain necessary fall risk management equipment  - Apply yellow socks and bracelet for high fall risk patients  - Consider moving patient to room near nurses station  Outcome: Progressing     Problem: PAIN - ADULT  Goal: Verbalizes/displays adequate comfort level or baseline comfort level  Description: Interventions:  - Encourage patient to monitor pain and request assistance  - Assess pain using appropriate pain scale  - Administer analgesics based on type and severity of pain and evaluate response  - Implement non-pharmacological measures as appropriate and evaluate response  - Consider cultural and social influences on pain and pain management  - Notify physician/advanced practitioner if interventions unsuccessful or patient reports new pain  Outcome: Progressing     Problem: INFECTION - ADULT  Goal: Absence or prevention of progression during hospitalization  Description: INTERVENTIONS:  - Assess and monitor for signs and symptoms of infection  - Monitor lab/diagnostic results  - Monitor all insertion sites, i.e. indwelling lines, tubes, and drains  - Monitor endotracheal if appropriate and nasal secretions for changes in amount and color  - [place] appropriate cooling/warming therapies per order  - Administer medications as ordered  - Instruct and encourage patient and family to use good hand hygiene technique  -  Identify and instruct in appropriate isolation precautions for identified infection/condition  Outcome: Progressing     Problem: SAFETY ADULT  Goal: Patient will remain free of falls  Description: INTERVENTIONS:  - Educate patient/family on patient safety including physical limitations  - Instruct patient to call for assistance with activity   - Consult OT/PT to assist with strengthening/mobility   - Keep Call bell within reach  - Keep bed low and locked with side rails adjusted as appropriate  - Keep care items and personal belongings within reach  - Initiate and maintain comfort rounds  - Make Fall Risk Sign visible to staff  - Offer Toileting every 2 Hours, in advance of need  - Initiate/Maintain alarm  - Obtain necessary fall risk management equipment  - Apply yellow socks and bracelet for high fall risk patients  - Consider moving patient to room near nurses station  Outcome: Progressing

## 2024-05-10 NOTE — ASSESSMENT & PLAN NOTE
Present on admission, evidenced by a creatinine of 2.99 with a baseline of 0.7-1.1  Etiology likely multifactorial, secondary to poor oral intake as well as ATN from septic shock/hypotension.  Patient's creatinine has bumped up  250 mL of bolus given repeat BMP today to see interval change  Avoid nephrotoxic medications

## 2024-05-10 NOTE — ASSESSMENT & PLAN NOTE
Chronically wears 4 L NC at baseline  Recent history of COVID-19 infection in  02/2024  CT chest Persistent multifocal consolidations may represent sequela of prior pneumonia. However, recurrent pneumonia or chronic infectious or inflammatory process is not excluded   Completed 4 days of IV ceftriaxone.  Today's update and plan:  Patient is doing good.  Patient can be discharged on levofloxacin 750 mg every 48 hourly for total of 7 days.  She is currently on 2 to 3 L of oxygen saturation more than 92%.

## 2024-05-10 NOTE — RESPIRATORY THERAPY NOTE
RT Protocol Note  Lauren Quintero 78 y.o. female MRN: [id number]  Unit/Bed#: -01 Encounter: [id number]    Assessment    Principal Problem:    Septic shock ([facility])  Active Problems:    Ambulatory dysfunction    Essential hypertension    Acute on chronic respiratory failure with hypoxia ([facility])    Coronary artery disease of native artery of native heart with stable angina pectoris ([facility])    Major depressive disorder, recurrent episode, moderate ([facility])    COPD (chronic obstructive pulmonary disease) ([facility])    Anemia    Chronic combined systolic and diastolic CHF (congestive heart failure) ([facility])    Acute kidney injury superimposed on chronic kidney disease  ([facility])      Home Pulmonary Medications:     05/10/24 1010   Respiratory Protocol   Protocol Initiated? No   Protocol Selection Respiratory   Language Barrier? No   Medical & Social History Reviewed? Yes   Diagnostic Studies Reviewed? Yes   Physical Assessment Performed? Yes   Home Devices/Therapy Home O2   Respiratory Plan Home Bronchodilator Patient pathway   Respiratory Assessment   Resp Comments continue with home regimen   Additional Assessments   Pulse 101   SpO2 96 %       Home Devices/Therapy: Home O2    Past Medical History:   Diagnosis Date    Acute congestive heart failure ([facility]) 08/27/2021    Anxiety     Cardiac disease     Chest pain 08/27/2021    Chronic pain disorder     COPD (chronic obstructive pulmonary disease) ([facility])     COVID-19 02/15/2024    Depression     Heart disease     Hyperlipidemia     Hypertension     MI (myocardial infarction) ([facility])     MRSA (methicillin resistant Staphylococcus aureus)     Renal disorder     benign kidney tumor     Social History     Socioeconomic History    Marital status:      Spouse name: None    Number of children: 3    Years of education: 13    Highest education level: High school graduate   Occupational History    Occupation:      Employer: [employer]     Comment: retired    Tobacco  Use    Smoking status: Former     Current packs/day: 0.00     Average packs/day: 1 pack/day for 60.0 years (60.0 ttl pk-yrs)     Types: Cigarettes     Start date:      Quit date: 2016     Years since quittin.3    Smokeless tobacco: Never    Tobacco comments:     She has close to a 60 pack-year smoking history, most recently has cut down to 4-5 cigarettes daily   Vaping Use    Vaping status: Never Used   Substance and Sexual Activity    Alcohol use: Never    Drug use: No    Sexual activity: Not Currently   Other Topics Concern    None   Social History Narrative    None     Social Determinants of Health     Financial Resource Strain: Patient Declined (2024)    Received from [facility]    Overall Financial Resource Strain (CARDIA)     Difficulty of Paying Living Expenses: Patient declined   Food Insecurity: No Food Insecurity (5/3/2024)    Hunger Vital Sign     Worried About Running Out of Food in the Last Year: Never true     Ran Out of Food in the Last Year: Never true   Transportation Needs: No Transportation Needs (5/3/2024)    PRAPARE - Transportation     Lack of Transportation (Medical): No     Lack of Transportation (Non-Medical): No   Physical Activity: Patient Declined (2024)    Received from [facility]    Exercise Vital Sign     Days of Exercise per Week: Patient declined     Minutes of Exercise per Session: Patient declined   Stress: Patient Declined (2024)    Received from [facility]    [employer] - Occupational Stress Questionnaire     Feeling of Stress : Patient declined   Social Connections: Patient Declined (2024)    Received from [facility]    Social Connection and Isolation Panel [NHANES]     Frequency of Communication with Friends and Family: Patient declined     Frequency of Social Gatherings with Friends and Family: Patient declined     Attends Tenriism Services: Patient declined     Active Member of Clubs or  "Organizations: Patient declined     Attends Club or Organization Meetings: Patient declined     Marital Status: Patient declined   Intimate Partner Violence: Patient Declined (1/6/2024)    Received from [facility]    Humiliation, Afraid, Rape, and Kick questionnaire     Fear of Current or Ex-Partner: Patient declined     Emotionally Abused: Patient declined     Physically Abused: Patient declined     Sexually Abused: Patient declined   Housing Stability: Low Risk  (5/3/2024)    Housing Stability Vital Sign     Unable to Pay for Housing in the Last Year: No     Number of Places Lived in the Last Year: 1     Unstable Housing in the Last Year: No       Subjective         Objective    Physical Exam:        Vitals:  Blood pressure 103/55, pulse 101, temperature 97.9 °F (36.6 °C), resp. rate 17, height 5' 7\" (1.702 m), weight 51.3 kg (113 lb 1.5 oz), SpO2 96%, not currently breastfeeding.          Imaging and other studies: I have personally reviewed pertinent reports.      O2 Device: NC     Plan    Respiratory Plan: Home Bronchodilator Patient pathway        Resp Comments: continue with home regimen   "

## 2024-05-10 NOTE — OCCUPATIONAL THERAPY NOTE
Occupational Therapy Treatment Note      Lauren Quintero    5/10/2024    Principal Problem:    Septic shock ([facility])  Active Problems:    Ambulatory dysfunction    Essential hypertension    Acute on chronic respiratory failure with hypoxia ([facility])    Coronary artery disease of native artery of native heart with stable angina pectoris ([facility])    Major depressive disorder, recurrent episode, moderate (HCC)    COPD (chronic obstructive pulmonary disease) ([facility])    Anemia    Chronic combined systolic and diastolic CHF (congestive heart failure) ([facility])    Acute kidney injury superimposed on chronic kidney disease  ([facility])      Past Medical History:   Diagnosis Date    Acute congestive heart failure ([facility]) 08/27/2021    Anxiety     Cardiac disease     Chest pain 08/27/2021    Chronic pain disorder     COPD (chronic obstructive pulmonary disease) ([facility])     COVID-19 02/15/2024    Depression     Heart disease     Hyperlipidemia     Hypertension     MI (myocardial infarction) ([facility])     MRSA (methicillin resistant Staphylococcus aureus)     Renal disorder     benign kidney tumor       Past Surgical History:   Procedure Laterality Date    APPENDECTOMY      CARDIAC CATHETERIZATION N/A 2/21/2024    Procedure: Cardiac catheterization;  Surgeon: Luke Malagon MD;  Location: [facility];  Service: Cardiology    CARDIAC CATHETERIZATION N/A 2/21/2024    Procedure: Cardiac Coronary Angiogram;  Surgeon: Luke Malagon MD;  Location: [facility];  Service: Cardiology    CARDIAC CATHETERIZATION Left 2/21/2024    Procedure: Cardiac Left Heart Cath;  Surgeon: Luke Malagon MD;  Location: [facility];  Service: Cardiology    ELBOW BURSA SURGERY Left 02/2018    D/T MRSA INFECTION    IR THORACENTESIS  2/27/2024    SPINAL FUSION      L7 L9       05/07/24 1113   OT Last Visit   OT Visit Date 05/07/24   Note Type   Note Type Treatment   Pain Assessment   Pain Assessment Tool 0-10   Pain Score 9   Pain Location/Orientation  Location: Hip   Restrictions/Precautions   Weight Bearing Precautions Per Order No   Other Precautions Chair Alarm;Bed Alarm;Aspiration;Multiple lines;Telemetry;Fall Risk   Lifestyle   Autonomy Patient lives with her Son in a one story house, [address], with family. SBA ambulation w/ rollator (doesn't leave her bed for short periods when she's alone), S for showers, reported using a bedside commode recently, but usually able to walk to/ from bathroom.   Reciprocal Relationships Supportive Family   ADL   Eating Assistance 6  Modified independent   Eating Deficit Increased time to complete   Grooming Assistance 5  Supervision/Setup   Grooming Deficit Increased time to complete;Supervision/safety;Setup   UB Bathing Assistance 4  Minimal Assistance   UB Bathing Deficit Increased time to complete;Supervision/safety;Verbal cueing;Steadying;Setup   LB Bathing Assistance 3  Moderate Assistance   LB Bathing Deficit Increased time to complete;Supervision/safety;Verbal cueing;Steadying;Setup   UB Dressing Assistance 4  Minimal Assistance   UB Dressing Deficit Increased time to complete;Supervision/safety;Verbal cueing;Steadying;Setup   LB Dressing Assistance 3  Moderate Assistance   LB Dressing Deficit Increased time to complete;Supervision/safety;Verbal cueing;Steadying;Setup   Toileting Assistance  2  Maximal Assistance   Toileting Deficit Increased time to complete;Supervison/safety;Verbal cueing;Steadying;Setup   Bed Mobility   Supine to Sit 4  Minimal assistance   Additional items Assist x 1;Bedrails;Increased time required;Verbal cues;LE management   Transfers   Sit to Stand 3  Moderate assistance   Additional items Assist x 1;Bedrails;Increased time required;Verbal cues   Stand to Sit 3  Moderate assistance   Additional items Assist x 1;Increased time required;Verbal cues;Armrests   Toilet Transfers   Toilet Transfer From Rolling walker   Toilet Transfer Type To and from   Toilet Transfer to Extra wide bedside commode    Toilet Transfer Technique Ambulating   Toilet Transfers Minimal assistance   Cognition   Overall Cognitive Status   (questionable safety, problem solving, and insight)   Arousal/Participation Alert;Responsive;Cooperative   Attention Attends with cues to redirect   Orientation Level Oriented to person;Oriented to place;Disoriented to situation;Oriented to time   Memory Within functional limits   Following Commands Follows one step commands with increased time or repetition   Comments Pt was agreeable to OT session   Activity Tolerance   Activity Tolerance Patient tolerated treatment well   Assessment   Assessment Pt participated in skilled OT session today addressing the following interventions ADL retraining with proper body mechanics, Patient / Family Education, Transfer Training, Therapeutic Activity, Safety Awareness, Fall Prevention, DME training, Back safety education & training, Activity tolerance training, Standing tolerance training, and Sitting/ standing balance task to increase EOB/ OOB sarmad performance tolerance. Upon arrival, OT completed 2 pt identifiers.  Pt agreeable to OT treatment session, upon arrival patient was found alert, responsive and in no apparent distress. Pt completed bed mobility with min A.  Pt completed sit to stand with mod A.  Pt completed ADLS at sink.   Pt was able to complete UB ADLS with min A and LB ADLS with mod A.  Pt completed functional mobility with RW and min A. Pt had to use bathroom and insisted on using commode chair at sink vs commode chair over toilet. OT discussed use of toilet with commode for height and safety, isabella as it was only 5 steps away. Pt agreed to attempt to complete mobility to toilet, however when she stood up bowel movement was uncontrolled and went onto floor. Pt then sat on commode to finish. Pt completed xfer to commode with RW and min A.  Pt requiring VCs and A throughout and edu in breath support, pursed lipped breathing, safety, pacing, and  increasing independence with ADL performance. Pt continues to be functioning below baseline level, occupational performance remains limited secondary to factors listed above and increased risk for falls and injury. From OT standpoint, recommendation at time of d/c would be Level 2 (Mod Resource Intensity).    Pt to benefit from continued Occupational Therapy treatment while in the hospital to address deficits as defined above and maximize level of functional independence with ADLs and functional mobility.   Plan   Treatment Interventions ADL retraining;Functional transfer training;Endurance training;Patient/family training;Equipment evaluation/education;Compensatory technique education;Continued evaluation;Cardiac education;Energy conservation;Activityengagement   Goal Expiration Date 05/17/24   OT Treatment Day 1   OT Frequency 3-5x/wk   Discharge Recommendation   Rehab Resource Intensity Level, OT II (Moderate Resource Intensity)   AM-PAC Daily Activity Inpatient   Lower Body Dressing 2   Bathing 2   Toileting 2   Upper Body Dressing 3   Grooming 3   Eating 4   Daily Activity Raw Score 16   Daily Activity Standardized Score (Calc for Raw Score >=11) 35.96   AM-PAC Applied Cognition Inpatient   Following a Speech/Presentation 3   Understanding Ordinary Conversation 3   Taking Medications 3   Remembering Where Things Are Placed or Put Away 3   Remembering List of 4-5 Errands 3   Taking Care of Complicated Tasks 3   Applied Cognition Raw Score 18   Applied Cognition Standardized Score 38.07   Zeenat Santamaria MS OTR/L

## 2024-05-11 LAB
ANION GAP SERPL CALCULATED.3IONS-SCNC: 4 MMOL/L (ref 4–13)
ANION GAP SERPL CALCULATED.3IONS-SCNC: 6 MMOL/L (ref 4–13)
BUN SERPL-MCNC: 40 MG/DL (ref 5–25)
BUN SERPL-MCNC: 40 MG/DL (ref 5–25)
CALCIUM SERPL-MCNC: 8.5 MG/DL (ref 8.4–10.2)
CALCIUM SERPL-MCNC: 8.8 MG/DL (ref 8.4–10.2)
CHLORIDE SERPL-SCNC: 101 MMOL/L (ref 96–108)
CHLORIDE SERPL-SCNC: 105 MMOL/L (ref 96–108)
CO2 SERPL-SCNC: 26 MMOL/L (ref 21–32)
CO2 SERPL-SCNC: 29 MMOL/L (ref 21–32)
CREAT SERPL-MCNC: 1.67 MG/DL (ref 0.6–1.3)
CREAT SERPL-MCNC: 1.8 MG/DL (ref 0.6–1.3)
ERYTHROCYTE [DISTWIDTH] IN BLOOD BY AUTOMATED COUNT: 15.6 % (ref 11.6–15.1)
GFR SERPL CREATININE-BSD FRML MDRD: 26 ML/MIN/1.73SQ M
GFR SERPL CREATININE-BSD FRML MDRD: 29 ML/MIN/1.73SQ M
GLUCOSE SERPL-MCNC: 130 MG/DL (ref 65–140)
GLUCOSE SERPL-MCNC: 298 MG/DL (ref 65–140)
HCT VFR BLD AUTO: 24.2 % (ref 34.8–46.1)
HGB BLD-MCNC: 7.3 G/DL (ref 11.5–15.4)
MCH RBC QN AUTO: 29.7 PG (ref 26.8–34.3)
MCHC RBC AUTO-ENTMCNC: 30.2 G/DL (ref 31.4–37.4)
MCV RBC AUTO: 98 FL (ref 82–98)
PLATELET # BLD AUTO: 229 THOUSANDS/UL (ref 149–390)
PMV BLD AUTO: 10.1 FL (ref 8.9–12.7)
POTASSIUM SERPL-SCNC: 3.9 MMOL/L (ref 3.5–5.3)
POTASSIUM SERPL-SCNC: 4.1 MMOL/L (ref 3.5–5.3)
RBC # BLD AUTO: 2.46 MILLION/UL (ref 3.81–5.12)
SODIUM SERPL-SCNC: 131 MMOL/L (ref 135–147)
SODIUM SERPL-SCNC: 140 MMOL/L (ref 135–147)
WBC # BLD AUTO: 10.5 THOUSAND/UL (ref 4.31–10.16)

## 2024-05-11 PROCEDURE — 99232 SBSQ HOSP IP/OBS MODERATE 35: CPT | Performed by: STUDENT IN AN ORGANIZED HEALTH CARE EDUCATION/TRAINING PROGRAM

## 2024-05-11 PROCEDURE — 80048 BASIC METABOLIC PNL TOTAL CA: CPT | Performed by: STUDENT IN AN ORGANIZED HEALTH CARE EDUCATION/TRAINING PROGRAM

## 2024-05-11 PROCEDURE — 94640 AIRWAY INHALATION TREATMENT: CPT

## 2024-05-11 PROCEDURE — 94760 N-INVAS EAR/PLS OXIMETRY 1: CPT

## 2024-05-11 PROCEDURE — 94664 DEMO&/EVAL PT USE INHALER: CPT

## 2024-05-11 PROCEDURE — 85027 COMPLETE CBC AUTOMATED: CPT | Performed by: STUDENT IN AN ORGANIZED HEALTH CARE EDUCATION/TRAINING PROGRAM

## 2024-05-11 RX ADMIN — HEPARIN SODIUM 5000 UNITS: 5000 INJECTION INTRAVENOUS; SUBCUTANEOUS at 05:05

## 2024-05-11 RX ADMIN — ASPIRIN 81 MG: 81 TABLET, COATED ORAL at 09:08

## 2024-05-11 RX ADMIN — GABAPENTIN 100 MG: 100 CAPSULE ORAL at 21:49

## 2024-05-11 RX ADMIN — CARIPRAZINE 1.5 MG: 1.5 CAPSULE, GELATIN COATED ORAL at 09:15

## 2024-05-11 RX ADMIN — CEFEPIME 1000 MG: 2 INJECTION, POWDER, FOR SOLUTION INTRAVENOUS at 21:49

## 2024-05-11 RX ADMIN — PANTOPRAZOLE SODIUM 40 MG: 40 TABLET, DELAYED RELEASE ORAL at 09:08

## 2024-05-11 RX ADMIN — VILAZODONE HYDROCHLORIDE 40 MG: 40 TABLET, FILM COATED ORAL at 09:15

## 2024-05-11 RX ADMIN — BUDESONIDE AND FORMOTEROL FUMARATE DIHYDRATE 2 PUFF: 80; 4.5 AEROSOL RESPIRATORY (INHALATION) at 17:06

## 2024-05-11 RX ADMIN — BUDESONIDE AND FORMOTEROL FUMARATE DIHYDRATE 2 PUFF: 80; 4.5 AEROSOL RESPIRATORY (INHALATION) at 09:14

## 2024-05-11 RX ADMIN — HEPARIN SODIUM 5000 UNITS: 5000 INJECTION INTRAVENOUS; SUBCUTANEOUS at 21:49

## 2024-05-11 RX ADMIN — Medication 4.5 MG: at 21:49

## 2024-05-11 RX ADMIN — AMLODIPINE BESYLATE 10 MG: 10 TABLET ORAL at 09:08

## 2024-05-11 RX ADMIN — IPRATROPIUM BROMIDE 0.5 MG: 0.5 SOLUTION RESPIRATORY (INHALATION) at 19:14

## 2024-05-11 RX ADMIN — SODIUM CHLORIDE 500 ML: 0.9 INJECTION, SOLUTION INTRAVENOUS at 09:19

## 2024-05-11 RX ADMIN — BUPROPION HYDROCHLORIDE 75 MG: 75 TABLET, FILM COATED ORAL at 09:08

## 2024-05-11 RX ADMIN — HEPARIN SODIUM 5000 UNITS: 5000 INJECTION INTRAVENOUS; SUBCUTANEOUS at 14:02

## 2024-05-11 RX ADMIN — CEFEPIME 1000 MG: 2 INJECTION, POWDER, FOR SOLUTION INTRAVENOUS at 09:19

## 2024-05-11 RX ADMIN — IPRATROPIUM BROMIDE 0.5 MG: 0.5 SOLUTION RESPIRATORY (INHALATION) at 13:06

## 2024-05-11 RX ADMIN — METOPROLOL SUCCINATE 100 MG: 100 TABLET, EXTENDED RELEASE ORAL at 09:08

## 2024-05-11 NOTE — PROGRESS NOTES
[facility]  Progress Note  Name: Lauren Quintero I  MRN: [id number]  Unit/Bed#: -[facility] I Date of Admission: 5/2/2024   Date of Service: 5/11/2024 I Hospital Day: 9    Assessment/Plan   Acute kidney injury superimposed on chronic kidney disease  (HCC)  Assessment & Plan  Present on admission, evidenced by a creatinine of 2.99 with a baseline of 0.7-1.1  Etiology likely multifactorial, secondary to poor oral intake as well as ATN from septic shock/hypotension.  Today's update and plan:  Looks like patient's new baseline 1.4-1.8  Give 1 bolus of IV fluids  BMP at 1800 today    Chronic combined systolic and diastolic CHF (congestive heart failure) ([facility])  Assessment & Plan  History of combined CHF with the most recent EF of 35%  Pt took off Life vest : reports she will wear at home.  Continue telemetry monitor.  GDMT includes Toprol XL, Entresto  Resume Toprol-XL, continue to hold Entresto  Monitor strict intake/output  Monitor weights    Anemia  Assessment & Plan  Noted history  EGD in 2023 Mild erosive gastritis and Duodenal bulb angiectasia status post APC  Colonoscopy in  2023 Left-sided diverticulosis with old blood clots likely indicating a diverticular bleed   Had Normal out pt video capsule endoscopy.   Continue to trend CBC while inpt.       COPD (chronic obstructive pulmonary disease) ([facility])  Assessment & Plan  Noted history; + smoker  Follows with Pulmonology OP  Chonically on 4L nc at home.  Takes Advair at home.  Continue nebs.  Monitor respiratory status closely    Major depressive disorder, recurrent episode, moderate ([facility])  Assessment & Plan  Takes reports patient taking multiple agents for depression  Continue with Vrylar, and Viibryd  Currently receiving Wellbutrin, other medications are non-formulary.      Coronary artery disease of native artery of native heart with stable angina pectoris ([facility])  Assessment & Plan  Continue with home medications - Aspirin, pravastatin,  metoprolol  History of Multivessel Disease as noted in 2/24; declined CABG.      Acute on chronic respiratory failure with hypoxia (HCC)  Assessment & Plan  Chronically wears 4 L NC at baseline  Recent history of COVID-19 infection in  02/2024  CT chest Persistent multifocal consolidations may represent sequela of prior pneumonia. However, recurrent pneumonia or chronic infectious or inflammatory process is not excluded   Completed 4 days of IV ceftriaxone.  Today's update and plan:  Patient is doing good.  Patient can be discharged on levofloxacin 750 mg every 48 hourly for total of 7 days.  She is currently on 2 to 3 L of oxygen saturation more than 92%.    Essential hypertension  Assessment & Plan  Patient does have history of hypertension, takes Toprol XL/Entresto at home.  She was noted to have hypotension on admission, secondary to septic shock, therefore these medications are currently on hold.    Shock is now resolved.  BP stable  Toprol XL amlodipine and Lasix 20 mg daily  Hold Entresto for now due to acute kidney injury improving    Ambulatory dysfunction  Assessment & Plan  Uses a walker for assistance at baseline  PT/OT following      * Septic shock (HCC)  Assessment & Plan  Resolved  Patient presented to the ER with lethargy and fevers for several days.  Met SIRS criteria on admission, evidenced by tachycardia, tachypnea, leukocytosis with an elevated procalcitonin  Patient also with septic shock criteria, evidenced by bandemia, DARLING with hypotension; requiring ICU admission for pressors  She was able to receive 2.5 L fluid bolus in the ER, but due to chronic combined HF with an EF of 35%, unable to give patient further fluid boluses.  2/2 blood cultures growing pansensitive E. coli.    Shock has now resolved.  Sepsis likely secondary to pyelonephritis.  Current hemodynamically stable.  Etiology of sepsis appears to be multifactorial - evidenced by pyelonephritis and pneumonia as seen on CT  imaging.  MRSA Negative, strep pneumoniae/legionella negative.  Initially was given cefepime and vancomycin.  Subsequently completed 4 days of IV ceftriaxone.  Transitioned to p.o. amoxicillin 500 mg twice daily given currently rates less than 30: Increase p.o. amoxicillin to 1000 mg twice daily once creatinine clearance improves more than 30.  Antibiotic course through 2024 for total 10-day course  Follow-up with final culture sensitivity.  Today's update and plan:  Continue with cefepime and transition to per oral levofloxacin every 48 hourly for total of 7 days               VTE Pharmacologic Prophylaxis: VTE Score: 3 Moderate Risk (Score 3-4) - Pharmacological DVT Prophylaxis Ordered: heparin.    Mobility:   Basic Mobility Inpatient Raw Score: 14  JH-HLM Goal: 4: Move to chair/commode  JH-HLM Achieved: 4: Move to chair/commode  JH-HLM Goal NOT achieved. Continue with multidisciplinary rounding and encourage appropriate mobility to improve upon JH-HLM goals.    Patient Centered Rounds: I performed bedside rounds with nursing staff today.   Discussions with Specialists or Other Care Team Provider: Care manager    Education and Discussions with Family / Patient:  Will reach out to her son.         Current Length of Stay: 9 day(s)  Current Patient Status: Inpatient   Certification Statement: The patient will continue to require additional inpatient hospital stay due to BMP monitoring  Discharge Plan: Anticipate discharge in 24-48 hrs to rehab facility.    Code Status: Level 1 - Full Code    Subjective:   Patient is complaining of fatigue.  Shortness of breath has significantly improved    Objective:     Vitals:   Temp (24hrs), Av.7 °F (36.5 °C), Min:97.4 °F (36.3 °C), Max:98 °F (36.7 °C)    Temp:  [97.4 °F (36.3 °C)-98 °F (36.7 °C)] 97.6 °F (36.4 °C)  HR:  [] 95  BP: (115-124)/(57-70) 115/57  SpO2:  [90 %-98 %] 95 %  Body mass index is 17.7 kg/m².     Input and Output Summary (last 24 hours):      Intake/Output Summary (Last 24 hours) at 5/11/2024 1526  Last data filed at 5/11/2024 1500  Gross per 24 hour   Intake 120 ml   Output 1800 ml   Net -1680 ml       Physical Exam:   Constitutional: Ill-appearing  HEENT: Pallor or icterus negative  CVS: S1 plus S2  Respiratory: Occasional crackles   Gastroenterology: Soft nontender without any palpable mass  Skin: No bruises or ecchymosis  Neurology: No focal logical deficit      Additional Data:     Labs:  Results from last 7 days   Lab Units 05/11/24  1018 05/06/24  0519 05/05/24  0549   WBC Thousand/uL 10.50*   < > 12.84*   HEMOGLOBIN g/dL 7.3*   < > 9.6*   HEMATOCRIT % 24.2*   < > 30.9*   PLATELETS Thousands/uL 229   < > 129*   SEGS PCT %  --   --  88*   LYMPHO PCT %  --   --  4*   MONO PCT %  --   --  6   EOS PCT %  --   --  0    < > = values in this interval not displayed.     Results from last 7 days   Lab Units 05/11/24  1018   SODIUM mmol/L 131*   POTASSIUM mmol/L 3.9   CHLORIDE mmol/L 101   CO2 mmol/L 26   BUN mg/dL 40*   CREATININE mg/dL 1.80*   ANION GAP mmol/L 4   CALCIUM mg/dL 8.5   GLUCOSE RANDOM mg/dL 298*         Results from last 7 days   Lab Units 05/05/24  1521   POC GLUCOSE mg/dl 128         Results from last 7 days   Lab Units 05/08/24  0834 05/07/24  0524 05/05/24  0549   PROCALCITONIN ng/ml 4.15* 8.11* 38.59*       Lines/Drains:  Invasive Devices       Peripheral Intravenous Line  Duration             Peripheral IV 05/09/24 Dorsal (posterior);Right Forearm 2 days                      Telemetry:  Telemetry Orders (From admission, onward)               LifeVest Patient: Continuous Telemetry Monitoring during hospitalization (non-expiring)  Continuous LifeVest Telemetry Monitoring        References:    LifeVest Policy        LifeVest Patient: Continuous Telemetry Monitoring during hospitalization (non-expiring)  (Lifevest Evaluation for removal/continue)  Continuous LifeVest Telemetry Monitoring        References:    LifeVest Policy                      Telemetry Reviewed: Normal Sinus Rhythm  Indication for Continued Telemetry Use: No indication for continued use. Will discontinue.              Imaging: No pertinent imaging reviewed.    Recent Cultures (last 7 days):   Results from last 7 days   Lab Units 05/08/24  0859   BLOOD CULTURE  No Growth at 48 hrs.  No Growth at 48 hrs.       Last 24 Hours Medication List:   Current Facility-Administered Medications   Medication Dose Route Frequency Provider Last Rate    acetaminophen  650 mg Oral Q6H PRN Allison Julian, DO      albuterol  2.5 mg Nebulization Q4H PRN Allison Julian, DO      amLODIPine  10 mg Oral Daily Allison Julian, DO      aspirin  81 mg Oral Daily Allison Julian, DO      budesonide-formoterol  2 puff Inhalation BID Clayton Ritchie MD      buPROPion  75 mg Oral Daily Allison Julian, DO      cariprazine  1.5 mg Oral Daily Clayton Ritchie MD      cefepime  1,000 mg Intravenous Q12H Clayton Ritchie MD 1,000 mg (05/11/24 0919)    gabapentin  100 mg Oral HS Allison Julian, DO      heparin (porcine)  5,000 Units Subcutaneous Q8H ROSELINE Allison Julian, DO      ipratropium  0.5 mg Nebulization TID Allison Julian, DO      melatonin  4.5 mg Oral HS Allison Julian, DO      metoprolol succinate  100 mg Oral Daily Allison Julian, DO      oxyCODONE  2.5 mg Oral Q6H PRN Clayton Ritchie MD      pantoprazole  40 mg Oral Daily Allison Julian, DO      vilazodone  40 mg Oral Daily Clayton Ritchie MD          Today, Patient Was Seen By: Clayton Ritchie MD    **Please Note: This note may have been constructed using a voice recognition system.**

## 2024-05-11 NOTE — ASSESSMENT & PLAN NOTE
Takes reports patient taking multiple agents for depression  Continue with Vrylar, and Viibryd  Currently receiving Wellbutrin, other medications are non-formulary.

## 2024-05-11 NOTE — ASSESSMENT & PLAN NOTE
Resolved  Patient presented to the ER with lethargy and fevers for several days.  Met SIRS criteria on admission, evidenced by tachycardia, tachypnea, leukocytosis with an elevated procalcitonin  Patient also with septic shock criteria, evidenced by bandemia, DARLING with hypotension; requiring ICU admission for pressors  She was able to receive 2.5 L fluid bolus in the ER, but due to chronic combined HF with an EF of 35%, unable to give patient further fluid boluses.  2/2 blood cultures growing pansensitive E. coli.    Shock has now resolved.  Sepsis likely secondary to pyelonephritis.  Current hemodynamically stable.  Etiology of sepsis appears to be multifactorial - evidenced by pyelonephritis and pneumonia as seen on CT imaging.  MRSA Negative, strep pneumoniae/legionella negative.  Initially was given cefepime and vancomycin.  Subsequently completed 4 days of IV ceftriaxone.  Transitioned to p.o. amoxicillin 500 mg twice daily given currently rates less than 30: Increase p.o. amoxicillin to 1000 mg twice daily once creatinine clearance improves more than 30.  Antibiotic course through 5/11/2024 for total 10-day course  Follow-up with final culture sensitivity.  Today's update and plan:  Continue with cefepime and transition to per oral levofloxacin every 48 hourly for total of 7 days

## 2024-05-11 NOTE — PLAN OF CARE
Problem: INFECTION - ADULT  Goal: Absence or prevention of progression during hospitalization  Description: INTERVENTIONS:  - Assess and monitor for signs and symptoms of infection  - Monitor lab/diagnostic results  - Monitor all insertion sites, i.e. indwelling lines, tubes, and drains  - Monitor endotracheal if appropriate and nasal secretions for changes in amount and color  - [place] appropriate cooling/warming therapies per order  - Administer medications as ordered  - Instruct and encourage patient and family to use good hand hygiene technique  - Identify and instruct in appropriate isolation precautions for identified infection/condition  Outcome: Progressing     Problem: SAFETY ADULT  Goal: Patient will remain free of falls  Description: INTERVENTIONS:  - Educate patient/family on patient safety including physical limitations  - Instruct patient to call for assistance with activity   - Consult OT/PT to assist with strengthening/mobility   - Keep Call bell within reach  - Keep bed low and locked with side rails adjusted as appropriate  - Keep care items and personal belongings within reach  - Initiate and maintain comfort rounds  - Make Fall Risk Sign visible to staff  - Offer Toileting every 2 Hours, in advance of need  - Initiate/Maintain bed alarm  - Apply yellow socks and bracelet for high fall risk patients  - Consider moving patient to room near nurses station  Outcome: Progressing

## 2024-05-11 NOTE — PLAN OF CARE
Problem: PAIN - ADULT  Goal: Verbalizes/displays adequate comfort level or baseline comfort level  Description: Interventions:  - Encourage patient to monitor pain and request assistance  - Assess pain using appropriate pain scale  - Administer analgesics based on type and severity of pain and evaluate response  - Implement non-pharmacological measures as appropriate and evaluate response  - Consider cultural and social influences on pain and pain management  - Notify physician/advanced practitioner if interventions unsuccessful or patient reports new pain  Outcome: Progressing     Problem: INFECTION - ADULT  Goal: Absence or prevention of progression during hospitalization  Description: INTERVENTIONS:  - Assess and monitor for signs and symptoms of infection  - Monitor lab/diagnostic results  - Monitor all insertion sites, i.e. indwelling lines, tubes, and drains  - Monitor endotracheal if appropriate and nasal secretions for changes in amount and color  - [place] appropriate cooling/warming therapies per order  - Administer medications as ordered  - Instruct and encourage patient and family to use good hand hygiene technique  - Identify and instruct in appropriate isolation precautions for identified infection/condition  Outcome: Progressing     Problem: Nutrition/Hydration-ADULT  Goal: Nutrient/Hydration intake appropriate for improving, restoring or maintaining nutritional needs  Description: Monitor and assess patient's nutrition/hydration status for malnutrition. Collaborate with interdisciplinary team and initiate plan and interventions as ordered.  Monitor patient's weight and dietary intake as ordered or per policy. Utilize nutrition screening tool and intervene as necessary. Determine patient's food preferences and provide high-protein, high-caloric foods as appropriate.     INTERVENTIONS:  - Monitor oral intake, urinary output, labs, and treatment plans  - Assess nutrition and hydration status and  recommend course of action  - Evaluate amount of meals eaten  - Assist patient with eating if necessary   - Allow adequate time for meals  - Recommend/ encourage appropriate diets, oral nutritional supplements, and vitamin/mineral supplements  - Order, calculate, and assess calorie counts as needed  - Recommend, monitor, and adjust tube feedings and TPN/PPN based on assessed needs  - Assess need for intravenous fluids  - Provide specific nutrition/hydration education as appropriate  - Include patient/family/caregiver in decisions related to nutrition  Outcome: Progressing     Problem: Knowledge Deficit  Goal: Patient/family/caregiver demonstrates understanding of disease process, treatment plan, medications, and discharge instructions  Description: Complete learning assessment and assess knowledge base.  Interventions:  - Provide teaching at level of understanding  - Provide teaching via preferred learning methods  Outcome: Progressing     Problem: Prexisting or High Potential for Compromised Skin Integrity  Goal: Skin integrity is maintained or improved  Description: INTERVENTIONS:  - Identify patients at risk for skin breakdown  - Assess and monitor skin integrity  - Assess and monitor nutrition and hydration status  - Monitor labs   - Assess for incontinence   - Turn and reposition patient  - Assist with mobility/ambulation  - Relieve pressure over bony prominences  - Avoid friction and shearing  - Provide appropriate hygiene as needed including keeping skin clean and dry  - Evaluate need for skin moisturizer/barrier cream  - Collaborate with interdisciplinary team   - Patient/family teaching  - Consider wound care consult   Outcome: Progressing

## 2024-05-11 NOTE — ASSESSMENT & PLAN NOTE
Present on admission, evidenced by a creatinine of 2.99 with a baseline of 0.7-1.1  Etiology likely multifactorial, secondary to poor oral intake as well as ATN from septic shock/hypotension.  Today's update and plan:  Looks like patient's new baseline 1.4-1.8  Give 1 bolus of IV fluids  BMP at 1800 today

## 2024-05-11 NOTE — ASSESSMENT & PLAN NOTE
History of combined CHF with the most recent EF of 35%  Pt took off Life vest : reports she will wear at home.  Continue telemetry monitor.  GDMT includes Toprol XL, Entresto  Resume Toprol-XL, continue to hold Entresto  Monitor strict intake/output  Monitor weights

## 2024-05-12 PROBLEM — D64.89 OTHER SPECIFIED ANEMIAS: Status: ACTIVE | Noted: 2024-05-12

## 2024-05-12 LAB
ANION GAP SERPL CALCULATED.3IONS-SCNC: 5 MMOL/L (ref 4–13)
BUN SERPL-MCNC: 36 MG/DL (ref 5–25)
CALCIUM SERPL-MCNC: 9 MG/DL (ref 8.4–10.2)
CHLORIDE SERPL-SCNC: 106 MMOL/L (ref 96–108)
CO2 SERPL-SCNC: 30 MMOL/L (ref 21–32)
CREAT SERPL-MCNC: 1.57 MG/DL (ref 0.6–1.3)
DAT POLY-SP REAG RBC QL: NEGATIVE
ERYTHROCYTE [DISTWIDTH] IN BLOOD BY AUTOMATED COUNT: 15.5 % (ref 11.6–15.1)
ERYTHROCYTE [DISTWIDTH] IN BLOOD BY AUTOMATED COUNT: 15.6 % (ref 11.6–15.1)
GFR SERPL CREATININE-BSD FRML MDRD: 31 ML/MIN/1.73SQ M
GLUCOSE SERPL-MCNC: 93 MG/DL (ref 65–140)
HCT VFR BLD AUTO: 23.1 % (ref 34.8–46.1)
HCT VFR BLD AUTO: 24.6 % (ref 34.8–46.1)
HGB BLD-MCNC: 7.1 G/DL (ref 11.5–15.4)
HGB BLD-MCNC: 7.6 G/DL (ref 11.5–15.4)
MCH RBC QN AUTO: 29.6 PG (ref 26.8–34.3)
MCH RBC QN AUTO: 30 PG (ref 26.8–34.3)
MCHC RBC AUTO-ENTMCNC: 30.7 G/DL (ref 31.4–37.4)
MCHC RBC AUTO-ENTMCNC: 30.9 G/DL (ref 31.4–37.4)
MCV RBC AUTO: 96 FL (ref 82–98)
MCV RBC AUTO: 97 FL (ref 82–98)
PLATELET # BLD AUTO: 271 THOUSANDS/UL (ref 149–390)
PLATELET # BLD AUTO: 277 THOUSANDS/UL (ref 149–390)
PMV BLD AUTO: 9.5 FL (ref 8.9–12.7)
PMV BLD AUTO: 9.8 FL (ref 8.9–12.7)
POTASSIUM SERPL-SCNC: 4.1 MMOL/L (ref 3.5–5.3)
RBC # BLD AUTO: 2.4 MILLION/UL (ref 3.81–5.12)
RBC # BLD AUTO: 2.53 MILLION/UL (ref 3.81–5.12)
SODIUM SERPL-SCNC: 141 MMOL/L (ref 135–147)
WBC # BLD AUTO: 10.08 THOUSAND/UL (ref 4.31–10.16)
WBC # BLD AUTO: 10.32 THOUSAND/UL (ref 4.31–10.16)

## 2024-05-12 PROCEDURE — 30233N1 TRANSFUSION OF NONAUTOLOGOUS RED BLOOD CELLS INTO PERIPHERAL VEIN, PERCUTANEOUS APPROACH: ICD-10-PCS | Performed by: FAMILY MEDICINE

## 2024-05-12 PROCEDURE — 86922 COMPATIBILITY TEST ANTIGLOB: CPT

## 2024-05-12 PROCEDURE — 80048 BASIC METABOLIC PNL TOTAL CA: CPT | Performed by: STUDENT IN AN ORGANIZED HEALTH CARE EDUCATION/TRAINING PROGRAM

## 2024-05-12 PROCEDURE — 94640 AIRWAY INHALATION TREATMENT: CPT

## 2024-05-12 PROCEDURE — 86921 COMPATIBILITY TEST INCUBATE: CPT

## 2024-05-12 PROCEDURE — 94760 N-INVAS EAR/PLS OXIMETRY 1: CPT

## 2024-05-12 PROCEDURE — 86850 RBC ANTIBODY SCREEN: CPT | Performed by: STUDENT IN AN ORGANIZED HEALTH CARE EDUCATION/TRAINING PROGRAM

## 2024-05-12 PROCEDURE — 99233 SBSQ HOSP IP/OBS HIGH 50: CPT | Performed by: STUDENT IN AN ORGANIZED HEALTH CARE EDUCATION/TRAINING PROGRAM

## 2024-05-12 PROCEDURE — 85027 COMPLETE CBC AUTOMATED: CPT | Performed by: STUDENT IN AN ORGANIZED HEALTH CARE EDUCATION/TRAINING PROGRAM

## 2024-05-12 PROCEDURE — 86900 BLOOD TYPING SEROLOGIC ABO: CPT | Performed by: STUDENT IN AN ORGANIZED HEALTH CARE EDUCATION/TRAINING PROGRAM

## 2024-05-12 PROCEDURE — 86880 COOMBS TEST DIRECT: CPT | Performed by: STUDENT IN AN ORGANIZED HEALTH CARE EDUCATION/TRAINING PROGRAM

## 2024-05-12 PROCEDURE — 86901 BLOOD TYPING SEROLOGIC RH(D): CPT | Performed by: STUDENT IN AN ORGANIZED HEALTH CARE EDUCATION/TRAINING PROGRAM

## 2024-05-12 PROCEDURE — 94664 DEMO&/EVAL PT USE INHALER: CPT

## 2024-05-12 RX ADMIN — IPRATROPIUM BROMIDE 0.5 MG: 0.5 SOLUTION RESPIRATORY (INHALATION) at 07:27

## 2024-05-12 RX ADMIN — IPRATROPIUM BROMIDE 0.5 MG: 0.5 SOLUTION RESPIRATORY (INHALATION) at 13:40

## 2024-05-12 RX ADMIN — AMLODIPINE BESYLATE 10 MG: 10 TABLET ORAL at 08:54

## 2024-05-12 RX ADMIN — CEFEPIME 1000 MG: 2 INJECTION, POWDER, FOR SOLUTION INTRAVENOUS at 21:11

## 2024-05-12 RX ADMIN — VILAZODONE HYDROCHLORIDE 40 MG: 40 TABLET, FILM COATED ORAL at 08:55

## 2024-05-12 RX ADMIN — Medication 4.5 MG: at 21:10

## 2024-05-12 RX ADMIN — PANTOPRAZOLE SODIUM 40 MG: 40 TABLET, DELAYED RELEASE ORAL at 08:54

## 2024-05-12 RX ADMIN — ASPIRIN 81 MG: 81 TABLET, COATED ORAL at 08:54

## 2024-05-12 RX ADMIN — BUPROPION HYDROCHLORIDE 75 MG: 75 TABLET, FILM COATED ORAL at 08:54

## 2024-05-12 RX ADMIN — HEPARIN SODIUM 5000 UNITS: 5000 INJECTION INTRAVENOUS; SUBCUTANEOUS at 15:16

## 2024-05-12 RX ADMIN — METOPROLOL SUCCINATE 100 MG: 100 TABLET, EXTENDED RELEASE ORAL at 08:54

## 2024-05-12 RX ADMIN — BUDESONIDE AND FORMOTEROL FUMARATE DIHYDRATE 2 PUFF: 80; 4.5 AEROSOL RESPIRATORY (INHALATION) at 08:55

## 2024-05-12 RX ADMIN — CARIPRAZINE 1.5 MG: 1.5 CAPSULE, GELATIN COATED ORAL at 08:55

## 2024-05-12 RX ADMIN — BUDESONIDE AND FORMOTEROL FUMARATE DIHYDRATE 2 PUFF: 80; 4.5 AEROSOL RESPIRATORY (INHALATION) at 17:29

## 2024-05-12 RX ADMIN — HEPARIN SODIUM 5000 UNITS: 5000 INJECTION INTRAVENOUS; SUBCUTANEOUS at 06:21

## 2024-05-12 RX ADMIN — IPRATROPIUM BROMIDE 0.5 MG: 0.5 SOLUTION RESPIRATORY (INHALATION) at 19:28

## 2024-05-12 RX ADMIN — CEFEPIME 1000 MG: 2 INJECTION, POWDER, FOR SOLUTION INTRAVENOUS at 08:57

## 2024-05-12 RX ADMIN — GABAPENTIN 100 MG: 100 CAPSULE ORAL at 21:10

## 2024-05-12 RX ADMIN — HEPARIN SODIUM 5000 UNITS: 5000 INJECTION INTRAVENOUS; SUBCUTANEOUS at 21:10

## 2024-05-12 RX ADMIN — ACETAMINOPHEN 650 MG: 325 TABLET, FILM COATED ORAL at 21:40

## 2024-05-12 NOTE — ASSESSMENT & PLAN NOTE
Present on admission, evidenced by a creatinine of 2.99 with a baseline of 0.7-1.1  Etiology likely multifactorial, secondary to poor oral intake as well as ATN from septic shock/hypotension.  Today's update and plan:  Trended down to 1.5 today  Encourage p.o. fluids  Patient has been encouraged to drink and eat

## 2024-05-12 NOTE — CASE MANAGEMENT
Case Management Discharge Planning Note    Patient name Lauren Quintero  Location /-01 MRN [id number]  : 1945 Date 2024       Current Admission Date: 2024  Current Admission Diagnosis:Septic shock ([facility])   Patient Active Problem List    Diagnosis Date Noted    Acute kidney injury superimposed on chronic kidney disease  ([facility]) 2024    Septic shock ([facility]) 2024    DARLING (acute kidney injury) ([facility]) 2024    Diarrhea 2024    Pain 04/15/2024    Loose stools 2024    Insomnia 2024    Non-ST elevation MI (NSTEMI) ([facility]) 2024    Abnormal chest x-ray 2024    Chronic combined systolic and diastolic CHF (congestive heart failure) ([facility]) 2024    Elevated troponin 2024    CKD (chronic kidney disease) 2024    Dizziness 2023    Social problem 2023    Diverticulitis 10/29/2023    Moderate protein-calorie malnutrition ([facility]) 2023    BRBPR (bright red blood per rectum) 2023    Generalized weakness 2023    Staring episodes 2023    Waldenstrom macroglobulinemia ([facility]) 2023    Severe protein-calorie malnutrition ([facility]) 2021    Positive blood culture 2021    COPD (chronic obstructive pulmonary disease) ([facility]) 2021    Anemia 2021    Abnormal computed tomography angiography (CTA) 2021    Fatigue 2021    Major depressive disorder, recurrent episode, moderate ([facility]) 2019    Flank pain 2019    Coronary artery disease of native artery of native heart with stable angina pectoris ([facility]) 2018    Acute on chronic respiratory failure with hypoxia ([facility]) 2018    Ambulatory dysfunction 2018    Essential hypertension 2018    Tobacco abuse 2018      LOS (days): 10  Geometric Mean LOS (GMLOS) (days): 2.6  Days to GMLOS:-7     OBJECTIVE:  Risk of Unplanned Readmission Score: 44.42         Current admission status: Inpatient   Preferred Pharmacy:   [id number] [place] - [address]  [address]  [place] [address]  Phone: [phone] Fax: [phone]    [facility] [place] - [address]  [address]  [address]  [place] [address]  Phone: [phone] Fax: [phone]    [place], [place] - [address]  [address] [facility] [address]  Phone: [phone] Fax: [phone]    [facility] [place] - [address]  [address] [facility] [place] [address]  Phone: [phone] Fax: [phone]    Primary Care Provider: Starla Bateman MD    Primary Insurance: MEDICARE  Secondary Insurance:     DISCHARGE DETAILS:                                          Other Referral/Resources/Interventions Provided:  Interventions: Short Term Rehab  Referral Comments: Pt was to have been discharged to [facility] today and a bed (110B) was available per nursing supervisor Neida, but STEVE has now decided to hold the d/c.  NE informed

## 2024-05-12 NOTE — ASSESSMENT & PLAN NOTE
Patient does have history of hypertension, takes Toprol XL/Entresto at home.  She was noted to have hypotension on admission, secondary to septic shock, therefore these medications are currently on hold.    Shock is now resolved.  BP stable  Toprol XL amlodipine  Hold Entresto and Lasix for now due to acute kidney injury improving

## 2024-05-12 NOTE — RESPIRATORY THERAPY NOTE
05/12/24 1928   Respiratory Protocol   Protocol Initiated? No   Protocol Selection Respiratory   Language Barrier? No   Medical & Social History Reviewed? Yes   Diagnostic Studies Reviewed? Yes   Physical Assessment Performed? Yes   Home Devices/Therapy Home O2   Respiratory Plan No distress/Pulmonary history;Home Bronchodilator Patient pathway   Respiratory Assessment   General Appearance Awake;Alert   Respiratory Pattern Normal   Chest Assessment Chest expansion symmetrical   Bilateral Breath Sounds Diminished   R Breath Sounds Expiratory wheezes;Scattered   L Breath Sounds Expiratory wheezes;Scattered   Cough None   Resp Comments pt c/o dyspnea   O2 Device 4L o2 nc   Additional Assessments   SpO2 94 %     Continue as ordered per home regimen

## 2024-05-12 NOTE — NURSING NOTE
Blood bank called. Patient does not meet criteria for blood transfusion inpatiently,  as per protocol. SLIM notified.

## 2024-05-12 NOTE — PROGRESS NOTES
[facility]  Progress Note  Name: Lauren Quintero I  MRN: [id number]  Unit/Bed#: -01 I Date of Admission: 5/2/2024   Date of Service: 5/12/2024 I Hospital Day: 10    Assessment/Plan   Acute kidney injury superimposed on chronic kidney disease  (HCC)  Assessment & Plan  Present on admission, evidenced by a creatinine of 2.99 with a baseline of 0.7-1.1  Etiology likely multifactorial, secondary to poor oral intake as well as ATN from septic shock/hypotension.  Today's update and plan:  Trended down to 1.5 today  Encourage p.o. fluids  Patient has been encouraged to drink and eat    Chronic combined systolic and diastolic CHF (congestive heart failure) ([facility])  Assessment & Plan  History of combined CHF with the most recent EF of 35%  Pt took off Life vest : reports she will wear at home.  Continue telemetry monitor.  GDMT includes Toprol XL, Entresto  Resume Toprol-XL, continue to hold Entresto  Monitor strict intake/output  Monitor weights    Anemia  Assessment & Plan  Noted history  EGD in 2023 Mild erosive gastritis and Duodenal bulb angiectasia status post APC  Colonoscopy in  2023 Left-sided diverticulosis with old blood clots likely indicating a diverticular bleed   Had Normal out pt video capsule endoscopy.   Continue to trend CBC while inpt.       COPD (chronic obstructive pulmonary disease) ([facility])  Assessment & Plan  Noted history; + smoker  Follows with Pulmonology OP  Chonically on 4L nc at home.  Takes Advair at home.  Continue nebs.  Monitor respiratory status closely    Major depressive disorder, recurrent episode, moderate ([facility])  Assessment & Plan  Takes reports patient taking multiple agents for depression  Continue with Vrylar, and Viibryd  Currently receiving Wellbutrin, other medications are non-formulary.      Coronary artery disease of native artery of native heart with stable angina pectoris ([facility])  Assessment & Plan  Continue with home medications - Aspirin, pravastatin,  metoprolol  History of Multivessel Disease as noted in 2/24; declined CABG.      Acute on chronic respiratory failure with hypoxia (HCC)  Assessment & Plan  Chronically wears 4 L NC at baseline  Recent history of COVID-19 infection in  02/2024  CT chest Persistent multifocal consolidations may represent sequela of prior pneumonia. However, recurrent pneumonia or chronic infectious or inflammatory process is not excluded   Completed 4 days of IV ceftriaxone.  Today's update and plan:  Patient is doing good.  Patient can be discharged on levofloxacin 750 mg every 48 hourly for total of 7 days.  She is currently on 2 to 3 L of oxygen saturation more than 92%.    Essential hypertension  Assessment & Plan  Patient does have history of hypertension, takes Toprol XL/Entresto at home.  She was noted to have hypotension on admission, secondary to septic shock, therefore these medications are currently on hold.    Shock is now resolved.  BP stable  Toprol XL amlodipine  Hold Entresto and Lasix for now due to acute kidney injury improving    Ambulatory dysfunction  Assessment & Plan  Uses a walker for assistance at baseline  PT/OT following      * Septic shock (HCC)  Assessment & Plan  Resolved  Patient presented to the ER with lethargy and fevers for several days.  Met SIRS criteria on admission, evidenced by tachycardia, tachypnea, leukocytosis with an elevated procalcitonin  Patient also with septic shock criteria, evidenced by bandemia, DARLING with hypotension; requiring ICU admission for pressors  She was able to receive 2.5 L fluid bolus in the ER, but due to chronic combined HF with an EF of 35%, unable to give patient further fluid boluses.  2/2 blood cultures growing pansensitive E. coli.    Shock has now resolved.  Sepsis likely secondary to pyelonephritis.  Current hemodynamically stable.  Etiology of sepsis appears to be multifactorial - evidenced by pyelonephritis and pneumonia as seen on CT imaging.  MRSA Negative,  strep pneumoniae/legionella negative.  Initially was given cefepime and vancomycin.  Subsequently completed 4 days of IV ceftriaxone.  Transitioned to p.o. amoxicillin 500 mg twice daily given currently rates less than 30: Increase p.o. amoxicillin to 1000 mg twice daily once creatinine clearance improves more than 30.  Antibiotic course through 2024 for total 10-day course  Follow-up with final culture sensitivity.  Today's update and plan:  Continue with cefepime and transition to per oral levofloxacin every 48 hourly for total of 7 days                 VTE Pharmacologic Prophylaxis: VTE Score: 3 Moderate Risk (Score 3-4) - Pharmacological DVT Prophylaxis Ordered: heparin.    Mobility:   Basic Mobility Inpatient Raw Score: 14  JH-HLM Goal: 4: Move to chair/commode  JH-HLM Achieved: 5: Stand (1 or more minutes)  JH-HLM Goal NOT achieved. Continue with multidisciplinary rounding and encourage appropriate mobility to improve upon JH-HLM goals.    Patient Centered Rounds: I performed bedside rounds with nursing staff today.   Discussions with Specialists or Other Care Team Provider: Care manager    Education and Discussions with Family / Patient: Updated  (son) at bedside.        Current Length of Stay: 10 day(s)  Current Patient Status: Inpatient   Certification Statement: The patient will continue to require additional inpatient hospital stay due to anemia and weakness  Discharge Plan: Anticipate discharge in 24-48 hrs to rehab facility.    Code Status: Level 1 - Full Code    Subjective:   Generalized weakness with not good appetite    Objective:     Vitals:   Temp (24hrs), Av.1 °F (36.7 °C), Min:97.6 °F (36.4 °C), Max:98.7 °F (37.1 °C)    Temp:  [97.6 °F (36.4 °C)-98.7 °F (37.1 °C)] 98 °F (36.7 °C)  HR:  [] 102  Resp:  [18] 18  BP: (115-123)/(57-65) 119/65  SpO2:  [94 %-97 %] 95 %  Body mass index is 17.26 kg/m².     Input and Output Summary (last 24 hours):     Intake/Output Summary  (Last 24 hours) at 5/12/2024 1329  Last data filed at 5/12/2024 1101  Gross per 24 hour   Intake 130 ml   Output 1282 ml   Net -1152 ml       Physical Exam:   Constitutional: Ill-appearing  HEENT: Pallor or icterus negative  CVS: S1 plus S2  Respiratory: Normal vascular breathe without crackles and wheeze  Gastroenterology: Soft nontender without any palpable mass  Skin: No bruises or ecchymosis  Neurology: No focal logical deficit      Additional Data:     Labs:  Results from last 7 days   Lab Units 05/12/24  1313   WBC Thousand/uL 10.08   HEMOGLOBIN g/dL 7.6*   HEMATOCRIT % 24.6*   PLATELETS Thousands/uL 271     Results from last 7 days   Lab Units 05/12/24  0600   SODIUM mmol/L 141   POTASSIUM mmol/L 4.1   CHLORIDE mmol/L 106   CO2 mmol/L 30   BUN mg/dL 36*   CREATININE mg/dL 1.57*   ANION GAP mmol/L 5   CALCIUM mg/dL 9.0   GLUCOSE RANDOM mg/dL 93         Results from last 7 days   Lab Units 05/05/24  1521   POC GLUCOSE mg/dl 128         Results from last 7 days   Lab Units 05/08/24  0834 05/07/24  0524   PROCALCITONIN ng/ml 4.15* 8.11*       Lines/Drains:  Invasive Devices       Peripheral Intravenous Line  Duration             Peripheral IV 05/09/24 Dorsal (posterior);Right Forearm 3 days                      Telemetry:  Telemetry Orders (From admission, onward)               LifeVest Patient: Continuous Telemetry Monitoring during hospitalization (non-expiring)  Continuous LifeVest Telemetry Monitoring        References:    LifeVest Policy        LifeVest Patient: Continuous Telemetry Monitoring during hospitalization (non-expiring)  (Lifevest Evaluation for removal/continue)  Continuous LifeVest Telemetry Monitoring        References:    LifeVest Policy                     Telemetry Reviewed: Normal Sinus Rhythm  Indication for Continued Telemetry Use: No indication for continued use. Will discontinue.              Imaging: No pertinent imaging reviewed.    Recent Cultures (last 7 days):   Results from last 7  days   Lab Units 05/08/24  0859   BLOOD CULTURE  No Growth at 72 hrs.  No Growth at 72 hrs.       Last 24 Hours Medication List:   Current Facility-Administered Medications   Medication Dose Route Frequency Provider Last Rate    acetaminophen  650 mg Oral Q6H PRN Allison Julian, DO      albuterol  2.5 mg Nebulization Q4H PRN Allison Julian, DO      amLODIPine  10 mg Oral Daily Allison Julian, DO      aspirin  81 mg Oral Daily Allison Julian, DO      budesonide-formoterol  2 puff Inhalation BID Clayton Ritchie MD      buPROPion  75 mg Oral Daily Allison Julian, DO      cariprazine  1.5 mg Oral Daily Clayton Ritchie MD      cefepime  1,000 mg Intravenous Q12H Clayton Ritchie MD 1,000 mg (05/12/24 0857)    gabapentin  100 mg Oral HS Allison Julian, DO      heparin (porcine)  5,000 Units Subcutaneous Q8H ROSELINE Allison Julian, DO      ipratropium  0.5 mg Nebulization TID Allison Julian, DO      melatonin  4.5 mg Oral HS Allison Julian, DO      metoprolol succinate  100 mg Oral Daily Allison Julian, DO      oxyCODONE  2.5 mg Oral Q6H PRN Clayton Ritchie MD      pantoprazole  40 mg Oral Daily Allison Julian, DO      vilazodone  40 mg Oral Daily Clayton Ritchie MD          Today, Patient Was Seen By: Clayton Ritchie MD    **Please Note: This note may have been constructed using a voice recognition system.**

## 2024-05-12 NOTE — RESPIRATORY THERAPY NOTE
RT Protocol Note  Lauren Quintero 78 y.o. female MRN: [id number]  Unit/Bed#: -01 Encounter: [id number]    Assessment    Principal Problem:    Septic shock ([facility])  Active Problems:    Ambulatory dysfunction    Essential hypertension    Acute on chronic respiratory failure with hypoxia ([facility])    Coronary artery disease of native artery of native heart with stable angina pectoris ([facility])    Major depressive disorder, recurrent episode, moderate (HCC)    COPD (chronic obstructive pulmonary disease) ([facility])    Anemia    Chronic combined systolic and diastolic CHF (congestive heart failure) ([facility])    Acute kidney injury superimposed on chronic kidney disease  ([facility])      Home Pulmonary Medications:     05/12/24 0728   Respiratory Protocol   Protocol Initiated? No   Protocol Selection Respiratory   Language Barrier? No   Medical & Social History Reviewed? Yes   Diagnostic Studies Reviewed? Yes   Physical Assessment Performed? Yes   Home Devices/Therapy Home O2   Respiratory Plan Home Bronchodilator Patient pathway   Respiratory Assessment   Assessment Type Pre-treatment   General Appearance Alert;Awake   Respiratory Pattern Normal   Chest Assessment Chest expansion symmetrical   Bilateral Breath Sounds Clear   Resp Comments continue home regimen   O2 Device nc   Additional Assessments   SpO2 95 %       Home Devices/Therapy: Home O2    Past Medical History:   Diagnosis Date    Acute congestive heart failure (HCC) 08/27/2021    Anxiety     Cardiac disease     Chest pain 08/27/2021    Chronic pain disorder     COPD (chronic obstructive pulmonary disease) ([facility])     COVID-19 02/15/2024    Depression     Heart disease     Hyperlipidemia     Hypertension     MI (myocardial infarction) ([facility])     MRSA (methicillin resistant Staphylococcus aureus)     Renal disorder     benign kidney tumor     Social History     Socioeconomic History    Marital status:      Spouse name: None    Number of children: 3    Years of education: 13     Highest education level: High school graduate   Occupational History    Occupation:      Employer: [employer]     Comment: retired    Tobacco Use    Smoking status: Former     Current packs/day: 0.00     Average packs/day: 1 pack/day for 60.0 years (60.0 ttl pk-yrs)     Types: Cigarettes     Start date:      Quit date: 2016     Years since quittin.3    Smokeless tobacco: Never    Tobacco comments:     She has close to a 60 pack-year smoking history, most recently has cut down to 4-5 cigarettes daily   Vaping Use    Vaping status: Never Used   Substance and Sexual Activity    Alcohol use: Never    Drug use: No    Sexual activity: Not Currently   Other Topics Concern    None   Social History Narrative    None     Social Determinants of Health     Financial Resource Strain: Patient Declined (2024)    Received from [facility]    Overall Financial Resource Strain (CARDIA)     Difficulty of Paying Living Expenses: Patient declined   Food Insecurity: No Food Insecurity (5/3/2024)    Hunger Vital Sign     Worried About Running Out of Food in the Last Year: Never true     Ran Out of Food in the Last Year: Never true   Transportation Needs: No Transportation Needs (5/3/2024)    PRAPARE - Transportation     Lack of Transportation (Medical): No     Lack of Transportation (Non-Medical): No   Physical Activity: Patient Declined (2024)    Received from [facility]    Exercise Vital Sign     Days of Exercise per Week: Patient declined     Minutes of Exercise per Session: Patient declined   Stress: Patient Declined (2024)    Received from [facility]    [employer] - Occupational Stress Questionnaire     Feeling of Stress : Patient declined   Social Connections: Patient Declined (2024)    Received from [facility]    Social Connection and Isolation Panel [NHANES]     Frequency of Communication with Friends and Family: Patient  "declined     Frequency of Social Gatherings with Friends and Family: Patient declined     Attends Rastafari Services: Patient declined     Active Member of Clubs or Organizations: Patient declined     Attends Club or Organization Meetings: Patient declined     Marital Status: Patient declined   Intimate Partner Violence: Patient Declined (1/6/2024)    Received from [facility]    Humiliation, Afraid, Rape, and Kick questionnaire     Fear of Current or Ex-Partner: Patient declined     Emotionally Abused: Patient declined     Physically Abused: Patient declined     Sexually Abused: Patient declined   Housing Stability: Low Risk  (5/3/2024)    Housing Stability Vital Sign     Unable to Pay for Housing in the Last Year: No     Number of Places Lived in the Last Year: 1     Unstable Housing in the Last Year: No       Subjective         Objective    Physical Exam:   Assessment Type: Pre-treatment  General Appearance: Alert, Awake  Respiratory Pattern: Normal  Chest Assessment: Chest expansion symmetrical  Bilateral Breath Sounds: Clear  O2 Device: nc    Vitals:  Blood pressure 119/65, pulse 102, temperature 98 °F (36.7 °C), resp. rate 18, height 5' 7\" (1.702 m), weight 50 kg (110 lb 3.7 oz), SpO2 97%, not currently breastfeeding.          Imaging and other studies: I have personally reviewed pertinent reports.      O2 Device: nc     Plan    Respiratory Plan: Home Bronchodilator Patient pathway        Resp Comments: continue home regimen   "

## 2024-05-13 PROBLEM — D64.89 OTHER SPECIFIED ANEMIAS: Status: RESOLVED | Noted: 2024-05-12 | Resolved: 2024-05-13

## 2024-05-13 LAB
ABO GROUP BLD: NORMAL
ANION GAP SERPL CALCULATED.3IONS-SCNC: 8 MMOL/L (ref 4–13)
BACTERIA BLD CULT: NORMAL
BACTERIA BLD CULT: NORMAL
BLD GP AB SCN SERPL QL: NEGATIVE
BUN SERPL-MCNC: 38 MG/DL (ref 5–25)
CALCIUM SERPL-MCNC: 9.5 MG/DL (ref 8.4–10.2)
CHLORIDE SERPL-SCNC: 104 MMOL/L (ref 96–108)
CO2 SERPL-SCNC: 28 MMOL/L (ref 21–32)
CREAT SERPL-MCNC: 1.73 MG/DL (ref 0.6–1.3)
ERYTHROCYTE [DISTWIDTH] IN BLOOD BY AUTOMATED COUNT: 15.8 % (ref 11.6–15.1)
GFR SERPL CREATININE-BSD FRML MDRD: 28 ML/MIN/1.73SQ M
GLUCOSE SERPL-MCNC: 100 MG/DL (ref 65–140)
HCT VFR BLD AUTO: 24.8 % (ref 34.8–46.1)
HGB BLD-MCNC: 7.4 G/DL (ref 11.5–15.4)
MCH RBC QN AUTO: 29 PG (ref 26.8–34.3)
MCHC RBC AUTO-ENTMCNC: 29.8 G/DL (ref 31.4–37.4)
MCV RBC AUTO: 97 FL (ref 82–98)
PLATELET # BLD AUTO: 339 THOUSANDS/UL (ref 149–390)
PMV BLD AUTO: 9.7 FL (ref 8.9–12.7)
POTASSIUM SERPL-SCNC: 4.5 MMOL/L (ref 3.5–5.3)
RBC # BLD AUTO: 2.55 MILLION/UL (ref 3.81–5.12)
RH BLD: NEGATIVE
SODIUM SERPL-SCNC: 140 MMOL/L (ref 135–147)
SPECIMEN EXPIRATION DATE: NORMAL
WBC # BLD AUTO: 12.1 THOUSAND/UL (ref 4.31–10.16)

## 2024-05-13 PROCEDURE — 94640 AIRWAY INHALATION TREATMENT: CPT

## 2024-05-13 PROCEDURE — P9016 RBC LEUKOCYTES REDUCED: HCPCS

## 2024-05-13 PROCEDURE — 80048 BASIC METABOLIC PNL TOTAL CA: CPT | Performed by: STUDENT IN AN ORGANIZED HEALTH CARE EDUCATION/TRAINING PROGRAM

## 2024-05-13 PROCEDURE — 94760 N-INVAS EAR/PLS OXIMETRY 1: CPT

## 2024-05-13 PROCEDURE — 94664 DEMO&/EVAL PT USE INHALER: CPT

## 2024-05-13 PROCEDURE — 85027 COMPLETE CBC AUTOMATED: CPT | Performed by: STUDENT IN AN ORGANIZED HEALTH CARE EDUCATION/TRAINING PROGRAM

## 2024-05-13 PROCEDURE — 99232 SBSQ HOSP IP/OBS MODERATE 35: CPT | Performed by: STUDENT IN AN ORGANIZED HEALTH CARE EDUCATION/TRAINING PROGRAM

## 2024-05-13 RX ADMIN — BUPROPION HYDROCHLORIDE 75 MG: 75 TABLET, FILM COATED ORAL at 11:31

## 2024-05-13 RX ADMIN — CEFEPIME 1000 MG: 2 INJECTION, POWDER, FOR SOLUTION INTRAVENOUS at 12:00

## 2024-05-13 RX ADMIN — CARIPRAZINE 1.5 MG: 1.5 CAPSULE, GELATIN COATED ORAL at 11:34

## 2024-05-13 RX ADMIN — GABAPENTIN 100 MG: 100 CAPSULE ORAL at 22:33

## 2024-05-13 RX ADMIN — BUDESONIDE AND FORMOTEROL FUMARATE DIHYDRATE 2 PUFF: 80; 4.5 AEROSOL RESPIRATORY (INHALATION) at 17:38

## 2024-05-13 RX ADMIN — CEFEPIME 1000 MG: 2 INJECTION, POWDER, FOR SOLUTION INTRAVENOUS at 23:18

## 2024-05-13 RX ADMIN — IPRATROPIUM BROMIDE 0.5 MG: 0.5 SOLUTION RESPIRATORY (INHALATION) at 20:01

## 2024-05-13 RX ADMIN — VILAZODONE HYDROCHLORIDE 40 MG: 40 TABLET, FILM COATED ORAL at 11:35

## 2024-05-13 RX ADMIN — HEPARIN SODIUM 5000 UNITS: 5000 INJECTION INTRAVENOUS; SUBCUTANEOUS at 04:51

## 2024-05-13 RX ADMIN — PANTOPRAZOLE SODIUM 40 MG: 40 TABLET, DELAYED RELEASE ORAL at 11:31

## 2024-05-13 RX ADMIN — BUDESONIDE AND FORMOTEROL FUMARATE DIHYDRATE 2 PUFF: 80; 4.5 AEROSOL RESPIRATORY (INHALATION) at 11:35

## 2024-05-13 RX ADMIN — IPRATROPIUM BROMIDE 0.5 MG: 0.5 SOLUTION RESPIRATORY (INHALATION) at 07:28

## 2024-05-13 RX ADMIN — AMLODIPINE BESYLATE 10 MG: 10 TABLET ORAL at 11:31

## 2024-05-13 RX ADMIN — ASPIRIN 81 MG: 81 TABLET, COATED ORAL at 11:32

## 2024-05-13 RX ADMIN — IPRATROPIUM BROMIDE 0.5 MG: 0.5 SOLUTION RESPIRATORY (INHALATION) at 13:38

## 2024-05-13 RX ADMIN — METOPROLOL SUCCINATE 100 MG: 100 TABLET, EXTENDED RELEASE ORAL at 11:31

## 2024-05-13 NOTE — ASSESSMENT & PLAN NOTE
Chronically wears 4 L NC at baseline  Recent history of COVID-19 infection in  02/2024  CT chest Persistent multifocal consolidations may represent sequela of prior pneumonia. However, recurrent pneumonia or chronic infectious or inflammatory process is not excluded   Completed 4 days of IV ceftriaxone.  Today's update and plan:  Currently on 2 to 3 L of oxygen   Patient can be discharged on levofloxacin 750 mg every 48 hourly for total of 7 days.

## 2024-05-13 NOTE — PLAN OF CARE
Problem: Potential for Falls  Goal: Patient will remain free of falls  Description: INTERVENTIONS:  - Educate patient/family on patient safety including physical limitations  - Instruct patient to call for assistance with activity   - Consult OT/PT to assist with strengthening/mobility   - Keep Call bell within reach  - Keep bed low and locked with side rails adjusted as appropriate  - Keep care items and personal belongings within reach  - Initiate and maintain comfort rounds  - Make Fall Risk Sign visible to staff  - Consider moving patient to room near nurses station  Outcome: Progressing     Problem: PAIN - ADULT  Goal: Verbalizes/displays adequate comfort level or baseline comfort level  Description: Interventions:  - Encourage patient to monitor pain and request assistance  - Assess pain using appropriate pain scale  - Administer analgesics based on type and severity of pain and evaluate response  - Implement non-pharmacological measures as appropriate and evaluate response  - Consider cultural and social influences on pain and pain management  - Notify physician/advanced practitioner if interventions unsuccessful or patient reports new pain  Outcome: Progressing     Problem: INFECTION - ADULT  Goal: Absence or prevention of progression during hospitalization  Description: INTERVENTIONS:  - Assess and monitor for signs and symptoms of infection  - Monitor lab/diagnostic results  - Monitor all insertion sites, i.e. indwelling lines, tubes, and drains  - Monitor endotracheal if appropriate and nasal secretions for changes in amount and color  - [place] appropriate cooling/warming therapies per order  - Administer medications as ordered  - Instruct and encourage patient and family to use good hand hygiene technique  - Identify and instruct in appropriate isolation precautions for identified infection/condition  Outcome: Progressing  Goal: Absence of fever/infection during neutropenic period  Description:  INTERVENTIONS:  - Monitor WBC    Outcome: Progressing     Problem: SAFETY ADULT  Goal: Patient will remain free of falls  Description: INTERVENTIONS:  - Educate patient/family on patient safety including physical limitations  - Instruct patient to call for assistance with activity   - Consult OT/PT to assist with strengthening/mobility   - Keep Call bell within reach  - Keep bed low and locked with side rails adjusted as appropriate  - Keep care items and personal belongings within reach  - Initiate and maintain comfort rounds  - Make Fall Risk Sign visible to staff  - Apply yellow socks and bracelet for high fall risk patients  - Consider moving patient to room near nurses station  Outcome: Progressing  Goal: Maintain or return to baseline ADL function  Description: INTERVENTIONS:  -  Assess patient's ability to carry out ADLs; assess patient's baseline for ADL function and identify physical deficits which impact ability to perform ADLs (bathing, care of mouth/teeth, toileting, grooming, dressing, etc.)  - Assess/evaluate cause of self-care deficits   - Assess range of motion  - Assess patient's mobility; develop plan if impaired  - Assess patient's need for assistive devices and provide as appropriate  - Encourage maximum independence but intervene and supervise when necessary  - Involve family in performance of ADLs  - Assess for home care needs following discharge   - Consider OT consult to assist with ADL evaluation and planning for discharge  - Provide patient education as appropriate  Outcome: Progressing  Goal: Maintains/Returns to pre admission functional level  Description: INTERVENTIONS:  - Perform AM-PAC 6 Click Basic Mobility/ Daily Activity assessment daily.  - Set and communicate daily mobility goal to care team and patient/family/caregiver.   - Collaborate with rehabilitation services on mobility goals if consulted  - Out of bed for toileting  - Record patient progress and toleration of activity  level   Outcome: Progressing     Problem: DISCHARGE PLANNING  Goal: Discharge to home or other facility with appropriate resources  Description: INTERVENTIONS:  - Identify barriers to discharge w/patient and caregiver  - Arrange for needed discharge resources and transportation as appropriate  - Identify discharge learning needs (meds, wound care, etc.)  - Arrange for interpretive services to assist at discharge as needed  - Refer to Case Management Department for coordinating discharge planning if the patient needs post-hospital services based on physician/advanced practitioner order or complex needs related to functional status, cognitive ability, or social support system  Outcome: Progressing     Problem: Knowledge Deficit  Goal: Patient/family/caregiver demonstrates understanding of disease process, treatment plan, medications, and discharge instructions  Description: Complete learning assessment and assess knowledge base.  Interventions:  - Provide teaching at level of understanding  - Provide teaching via preferred learning methods  Outcome: Progressing     Problem: Prexisting or High Potential for Compromised Skin Integrity  Goal: Skin integrity is maintained or improved  Description: INTERVENTIONS:  - Identify patients at risk for skin breakdown  - Assess and monitor skin integrity  - Assess and monitor nutrition and hydration status  - Monitor labs   - Assess for incontinence   - Turn and reposition patient  - Assist with mobility/ambulation  - Relieve pressure over bony prominences  - Avoid friction and shearing  - Provide appropriate hygiene as needed including keeping skin clean and dry  - Evaluate need for skin moisturizer/barrier cream  - Collaborate with interdisciplinary team   - Patient/family teaching  - Consider wound care consult   Outcome: Progressing     Problem: Nutrition/Hydration-ADULT  Goal: Nutrient/Hydration intake appropriate for improving, restoring or maintaining nutritional  needs  Description: Monitor and assess patient's nutrition/hydration status for malnutrition. Collaborate with interdisciplinary team and initiate plan and interventions as ordered.  Monitor patient's weight and dietary intake as ordered or per policy. Utilize nutrition screening tool and intervene as necessary. Determine patient's food preferences and provide high-protein, high-caloric foods as appropriate.     INTERVENTIONS:  - Monitor oral intake, urinary output, labs, and treatment plans  - Assess nutrition and hydration status and recommend course of action  - Evaluate amount of meals eaten  - Assist patient with eating if necessary   - Allow adequate time for meals  - Recommend/ encourage appropriate diets, oral nutritional supplements, and vitamin/mineral supplements  - Order, calculate, and assess calorie counts as needed  - Recommend, monitor, and adjust tube feedings and TPN/PPN based on assessed needs  - Assess need for intravenous fluids  - Provide specific nutrition/hydration education as appropriate  - Include patient/family/caregiver in decisions related to nutrition  Outcome: Progressing

## 2024-05-13 NOTE — PROGRESS NOTES
[facility]  Progress Note  Name: Lauren Quintero I  MRN: [id number]  Unit/Bed#: MS 328Slime I Date of Admission: 5/2/2024   Date of Service: 5/13/2024 I Hospital Day: 11    Assessment/Plan   Acute kidney injury superimposed on chronic kidney disease  (HCC)  Assessment & Plan  Present on admission, evidenced by a creatinine of 2.99 with a baseline of 0.7-1.1  Etiology likely multifactorial, secondary to poor oral intake as well as ATN from septic shock/hypotension.  Today's update and plan:  Trended down to 1.7 today could be a new baseline renal functions  Encourage p.o. fluids  Patient has been encouraged to drink and eat    Chronic combined systolic and diastolic CHF (congestive heart failure) ([facility])  Assessment & Plan  History of combined CHF with the most recent EF of 35%  Pt took off Life vest : reports she will wear at home.  Continue telemetry monitor.  GDMT includes Toprol XL, Entresto  Resume Toprol-XL, continue to hold Entresto  Monitor strict intake/output  Monitor weights    Anemia  Assessment & Plan  Noted history  EGD in 2023 Mild erosive gastritis and Duodenal bulb angiectasia status post APC  Colonoscopy in  2023 Left-sided diverticulosis with old blood clots likely indicating a diverticular bleed   Had Normal out pt video capsule endoscopy.   Patient hemoglobin trended down to 7.4.  Ordered 1 unit of blood transfusion for symptomatic anemia but blood bank refused that days not the protocol and got up to get somebody blood transfusion      COPD (chronic obstructive pulmonary disease) ([facility])  Assessment & Plan  Noted history; + smoker  Follows with Pulmonology OP  Chonically on 4L nc at home.  Takes Advair at home.  Continue nebs.  Monitor respiratory status closely    Major depressive disorder, recurrent episode, moderate ([facility])  Assessment & Plan  Takes reports patient taking multiple agents for depression  Continue with Vrylar, and Viibryd  Currently receiving Wellbutrin, other  medications are non-formulary.      Coronary artery disease of native artery of native heart with stable angina pectoris (HCC)  Assessment & Plan  Continue with home medications - Aspirin, pravastatin, metoprolol  History of Multivessel Disease as noted in 2/24; declined CABG.      Acute on chronic respiratory failure with hypoxia (HCC)  Assessment & Plan  Chronically wears 4 L NC at baseline  Recent history of COVID-19 infection in  02/2024  CT chest Persistent multifocal consolidations may represent sequela of prior pneumonia. However, recurrent pneumonia or chronic infectious or inflammatory process is not excluded   Completed 4 days of IV ceftriaxone.  Today's update and plan:  Currently on 2 to 3 L of oxygen   Patient can be discharged on levofloxacin 750 mg every 48 hourly for total of 7 days.     Essential hypertension  Assessment & Plan  Patient does have history of hypertension, takes Toprol XL/Entresto at home.  She was noted to have hypotension on admission, secondary to septic shock, therefore these medications are currently on hold.    Shock is now resolved.  BP stable  Toprol XL amlodipine  Hold Entresto and Lasix for now in the setting of resolving DARLING    Ambulatory dysfunction  Assessment & Plan  Uses a walker for assistance at baseline  PT/OT following      * Septic shock (HCC)  Assessment & Plan  Resolved  Patient presented to the ER with lethargy and fevers for several days.  Met SIRS criteria on admission, evidenced by tachycardia, tachypnea, leukocytosis with an elevated procalcitonin  Patient also with septic shock criteria, evidenced by bandemia, DARLING with hypotension; requiring ICU admission for pressors  She was able to receive 2.5 L fluid bolus in the ER, but due to chronic combined HF with an EF of 35%, unable to give patient further fluid boluses.  2/2 blood cultures growing pansensitive E. coli.    Shock has now resolved.  Sepsis likely secondary to pyelonephritis.  Current  hemodynamically stable.  Etiology of sepsis appears to be multifactorial - evidenced by pyelonephritis and pneumonia as seen on CT imaging.  MRSA Negative, strep pneumoniae/legionella negative.  Initially was given cefepime and vancomycin.  Subsequently completed 4 days of IV ceftriaxone.  Transitioned to p.o. amoxicillin 500 mg twice daily given currently rates less than 30: Increase p.o. amoxicillin to 1000 mg twice daily once creatinine clearance improves more than 30.  Antibiotic course through 2024 for total 10-day course  Follow-up with final culture sensitivity.  Today's update and plan:  Continue with cefepime and transition to per oral levofloxacin every 48 hourly for total of 7 days               VTE Pharmacologic Prophylaxis: VTE Score: 3 Moderate Risk (Score 3-4) - Pharmacological DVT Prophylaxis Ordered: SCD.    Mobility:   Basic Mobility Inpatient Raw Score: 14  -HLM Goal: 4: Move to chair/commode  JH-HLM Achieved: 5: Stand (1 or more minutes)  JH-HLM Goal NOT achieved. Continue with multidisciplinary rounding and encourage appropriate mobility to improve upon JH-HLM goals.    Patient Centered Rounds: I performed bedside rounds with nursing staff today.   Discussions with Specialists or Other Care Team Provider: cm    Education and Discussions with Family / Patient: Will reach out to patient's son        Current Length of Stay: 11 day(s)  Current Patient Status: Inpatient   Certification Statement: The patient will continue to require additional inpatient hospital stay due to acute on chronic blood loss anemia suspected  Discharge Plan: Anticipate discharge in 24-48 hrs to rehab facility.    Code Status: Level 1 - Full Code    Subjective:   Patient was well-oriented to time place and person without any cardiorespiratory stress.  She is currently on 2 L of oxygen.    Objective:     Vitals:   Temp (24hrs), Av.1 °F (36.7 °C), Min:97.5 °F (36.4 °C), Max:98.8 °F (37.1 °C)    Temp:  [97.5 °F (36.4  °C)-98.8 °F (37.1 °C)] 98.2 °F (36.8 °C)  HR:  [] 105  Resp:  [18-20] 18  BP: (120-137)/(48-72) 136/72  SpO2:  [94 %-97 %] 96 %  Body mass index is 17.54 kg/m².     Input and Output Summary (last 24 hours):     Intake/Output Summary (Last 24 hours) at 5/13/2024 1403  Last data filed at 5/13/2024 1244  Gross per 24 hour   Intake 680 ml   Output 200 ml   Net 480 ml       Physical Exam:   Constitutional: No acute distress  HEENT: Pallor or icterus negative  CVS: S1 plus S2  Respiratory: Normal vascular breathe without crackles and wheeze  Gastroenterology: Soft nontender without any palpable mass  Skin: No bruises or ecchymosis  Neurology: No focal logical deficit      Additional Data:     Labs:  Results from last 7 days   Lab Units 05/13/24  0450   WBC Thousand/uL 12.10*   HEMOGLOBIN g/dL 7.4*   HEMATOCRIT % 24.8*   PLATELETS Thousands/uL 339     Results from last 7 days   Lab Units 05/13/24  0707   SODIUM mmol/L 140   POTASSIUM mmol/L 4.5   CHLORIDE mmol/L 104   CO2 mmol/L 28   BUN mg/dL 38*   CREATININE mg/dL 1.73*   ANION GAP mmol/L 8   CALCIUM mg/dL 9.5   GLUCOSE RANDOM mg/dL 100                 Results from last 7 days   Lab Units 05/08/24  0834 05/07/24  0524   PROCALCITONIN ng/ml 4.15* 8.11*       Lines/Drains:  Invasive Devices       Peripheral Intravenous Line  Duration             Peripheral IV 05/13/24 Distal;Dorsal (posterior);Right Forearm <1 day                      Telemetry:  Telemetry Orders (From admission, onward)               LifeVest Patient: Continuous Telemetry Monitoring during hospitalization (non-expiring)  Continuous LifeVest Telemetry Monitoring        References:    LifeVest Policy        LifeVest Patient: Continuous Telemetry Monitoring during hospitalization (non-expiring)  (Lifevest Evaluation for removal/continue)  Continuous LifeVest Telemetry Monitoring        References:    LifeVest Policy                     Telemetry Reviewed: Normal Sinus Rhythm  Indication for Continued  Telemetry Use: Will keep her on telemetry             Imaging: No pertinent imaging reviewed.    Recent Cultures (last 7 days):   Results from last 7 days   Lab Units 05/08/24  0859   BLOOD CULTURE  No Growth After 4 Days.  No Growth After 4 Days.       Last 24 Hours Medication List:   Current Facility-Administered Medications   Medication Dose Route Frequency Provider Last Rate    acetaminophen  650 mg Oral Q6H PRN Allison Julian, DO      albuterol  2.5 mg Nebulization Q4H PRN Allison Julian, DO      amLODIPine  10 mg Oral Daily Allison Julian, DO      aspirin  81 mg Oral Daily Allison Julian, DO      budesonide-formoterol  2 puff Inhalation BID Clayton Ritchie MD      buPROPion  75 mg Oral Daily Allison Julian, DO      cariprazine  1.5 mg Oral Daily Clayton Ritchie MD      cefepime  1,000 mg Intravenous Q12H Clayton Ritchie MD 1,000 mg (05/13/24 1200)    gabapentin  100 mg Oral HS Allison Julian, DO      heparin (porcine)  5,000 Units Subcutaneous Q8H ROSELINE Allison Julian, DO      ipratropium  0.5 mg Nebulization TID Allison Julian, DO      melatonin  4.5 mg Oral HS Allison Julian, DO      metoprolol succinate  100 mg Oral Daily Allison Julian, DO      oxyCODONE  2.5 mg Oral Q6H PRN Clayton Ritchie MD      pantoprazole  40 mg Oral Daily Allison Julian, DO      vilazodone  40 mg Oral Daily Clayton Ritchie MD          Today, Patient Was Seen By: Clayton Ritchie MD    **Please Note: This note may have been constructed using a voice recognition system.**

## 2024-05-13 NOTE — ASSESSMENT & PLAN NOTE
Present on admission, evidenced by a creatinine of 2.99 with a baseline of 0.7-1.1  Etiology likely multifactorial, secondary to poor oral intake as well as ATN from septic shock/hypotension.  Today's update and plan:  Trended down to 1.7 today could be a new baseline renal functions  Encourage p.o. fluids  Patient has been encouraged to drink and eat

## 2024-05-13 NOTE — ASSESSMENT & PLAN NOTE
Noted history  EGD in 2023 Mild erosive gastritis and Duodenal bulb angiectasia status post APC  Colonoscopy in  2023 Left-sided diverticulosis with old blood clots likely indicating a diverticular bleed   Had Normal out pt video capsule endoscopy.   Patient hemoglobin trended down to 7.4.  Ordered 1 unit of blood transfusion for symptomatic anemia but blood bank refused that days not the protocol and got up to get somebody blood transfusion

## 2024-05-13 NOTE — RESPIRATORY THERAPY NOTE
RT Protocol Note  Lauren Quintero 78 y.o. female MRN: [id number]  Unit/Bed#: -01 Encounter: [id number]    Assessment    Principal Problem:    Septic shock ([facility])  Active Problems:    Ambulatory dysfunction    Essential hypertension    Acute on chronic respiratory failure with hypoxia ([facility])    Coronary artery disease of native artery of native heart with stable angina pectoris ([facility])    Major depressive disorder, recurrent episode, moderate (HCC)    COPD (chronic obstructive pulmonary disease) ([facility])    Anemia    Chronic combined systolic and diastolic CHF (congestive heart failure) ([facility])    Acute kidney injury superimposed on chronic kidney disease  ([facility])    Other specified anemias      Home Pulmonary Medications:    Home Devices/Therapy: Home O2    Past Medical History:   Diagnosis Date    Acute congestive heart failure ([facility]) 2021    Anxiety     Cardiac disease     Chest pain 2021    Chronic pain disorder     COPD (chronic obstructive pulmonary disease) ([facility])     COVID-19 02/15/2024    Depression     Heart disease     Hyperlipidemia     Hypertension     MI (myocardial infarction) ([facility])     MRSA (methicillin resistant Staphylococcus aureus)     Renal disorder     benign kidney tumor     Social History     Socioeconomic History    Marital status:      Spouse name: None    Number of children: 3    Years of education: 13    Highest education level: High school graduate   Occupational History    Occupation: [employer]     Employer: [employer]     Comment: retired    Tobacco Use    Smoking status: Former     Current packs/day: 0.00     Average packs/day: 1 pack/day for 60.0 years (60.0 ttl pk-yrs)     Types: Cigarettes     Start date:      Quit date: 2016     Years since quittin.3    Smokeless tobacco: Never    Tobacco comments:     She has close to a 60 pack-year smoking history, most recently has cut down to 4-5 cigarettes daily   Vaping Use    Vaping status: Never Used    Substance and Sexual Activity    Alcohol use: Never    Drug use: No    Sexual activity: Not Currently   Other Topics Concern    None   Social History Narrative    None     Social Determinants of Health     Financial Resource Strain: Patient Declined (1/6/2024)    Received from [facility]    Overall Financial Resource Strain (CARDIA)     Difficulty of Paying Living Expenses: Patient declined   Food Insecurity: No Food Insecurity (5/3/2024)    Hunger Vital Sign     Worried About Running Out of Food in the Last Year: Never true     Ran Out of Food in the Last Year: Never true   Transportation Needs: No Transportation Needs (5/3/2024)    PRAPARE - Transportation     Lack of Transportation (Medical): No     Lack of Transportation (Non-Medical): No   Physical Activity: Patient Declined (1/6/2024)    Received from [facility]    Exercise Vital Sign     Days of Exercise per Week: Patient declined     Minutes of Exercise per Session: Patient declined   Stress: Patient Declined (1/6/2024)    Received from [facility]    [place] [employer] - Occupational Stress Questionnaire     Feeling of Stress : Patient declined   Social Connections: Patient Declined (1/6/2024)    Received from [facility]    Social Connection and Isolation Panel [NHANES]     Frequency of Communication with Friends and Family: Patient declined     Frequency of Social Gatherings with Friends and Family: Patient declined     Attends [employer] Services: Patient declined     Active Member of Clubs or Organizations: Patient declined     Attends Club or Organization Meetings: Patient declined     Marital Status: Patient declined   Intimate Partner Violence: Patient Declined (1/6/2024)    Received from [facility]    Humiliation, Afraid, Rape, and Kick questionnaire     Fear of Current or Ex-Partner: Patient declined     Emotionally Abused: Patient declined     Physically Abused: Patient declined     Sexually  "Abused: Patient declined   Housing Stability: Low Risk  (5/3/2024)    Housing Stability Vital Sign     Unable to Pay for Housing in the Last Year: No     Number of Places Lived in the Last Year: 1     Unstable Housing in the Last Year: No       Subjective         Objective    Physical Exam:   Assessment Type: During-treatment  General Appearance: Awake, Alert  Respiratory Pattern: Normal  Chest Assessment: Chest expansion symmetrical  Bilateral Breath Sounds: Diminished  R Breath Sounds: Crackles (fine crackles in the base)  Cough: None  O2 Device: NC    Vitals:  Blood pressure (!) 120/48, pulse (P) 101, temperature 97.5 °F (36.4 °C), resp. rate (P) 18, height 5' 7\" (1.702 m), weight 50.8 kg (111 lb 15.9 oz), SpO2 95%, not currently breastfeeding.          Imaging and other studies: I have personally reviewed pertinent reports.      O2 Device: NC     Plan    Respiratory Plan: No distress/Pulmonary history, Home Bronchodilator Patient pathway        Resp Comments: Pt in no apparent distress.  Offers no complaints.  Will cont w/ current therapy.   "

## 2024-05-13 NOTE — ASSESSMENT & PLAN NOTE
Patient does have history of hypertension, takes Toprol XL/Entresto at home.  She was noted to have hypotension on admission, secondary to septic shock, therefore these medications are currently on hold.    Shock is now resolved.  BP stable  Toprol XL amlodipine  Hold Entresto and Lasix for now in the setting of resolving DARLING

## 2024-05-14 LAB
ABO GROUP BLD BPU: NORMAL
ANION GAP SERPL CALCULATED.3IONS-SCNC: 4 MMOL/L (ref 4–13)
BASOPHILS # BLD AUTO: 0.03 THOUSANDS/ÂΜL (ref 0–0.1)
BASOPHILS NFR BLD AUTO: 0 % (ref 0–1)
BPU ID: NORMAL
BUN SERPL-MCNC: 30 MG/DL (ref 5–25)
CALCIUM SERPL-MCNC: 9.3 MG/DL (ref 8.4–10.2)
CHLORIDE SERPL-SCNC: 105 MMOL/L (ref 96–108)
CO2 SERPL-SCNC: 32 MMOL/L (ref 21–32)
CREAT SERPL-MCNC: 1.5 MG/DL (ref 0.6–1.3)
CROSSMATCH: NORMAL
EOSINOPHIL # BLD AUTO: 0.08 THOUSAND/ÂΜL (ref 0–0.61)
EOSINOPHIL NFR BLD AUTO: 1 % (ref 0–6)
ERYTHROCYTE [DISTWIDTH] IN BLOOD BY AUTOMATED COUNT: 15.5 % (ref 11.6–15.1)
GFR SERPL CREATININE-BSD FRML MDRD: 33 ML/MIN/1.73SQ M
GLUCOSE SERPL-MCNC: 83 MG/DL (ref 65–140)
HCT VFR BLD AUTO: 28.1 % (ref 34.8–46.1)
HGB BLD-MCNC: 8.9 G/DL (ref 11.5–15.4)
IMM GRANULOCYTES # BLD AUTO: 0.04 THOUSAND/UL (ref 0–0.2)
IMM GRANULOCYTES NFR BLD AUTO: 0 % (ref 0–2)
LYMPHOCYTES # BLD AUTO: 1.13 THOUSANDS/ÂΜL (ref 0.6–4.47)
LYMPHOCYTES NFR BLD AUTO: 13 % (ref 14–44)
MCH RBC QN AUTO: 30 PG (ref 26.8–34.3)
MCHC RBC AUTO-ENTMCNC: 31.7 G/DL (ref 31.4–37.4)
MCV RBC AUTO: 95 FL (ref 82–98)
MONOCYTES # BLD AUTO: 1.16 THOUSAND/ÂΜL (ref 0.17–1.22)
MONOCYTES NFR BLD AUTO: 13 % (ref 4–12)
NEUTROPHILS # BLD AUTO: 6.53 THOUSANDS/ÂΜL (ref 1.85–7.62)
NEUTS SEG NFR BLD AUTO: 73 % (ref 43–75)
NRBC BLD AUTO-RTO: 0 /100 WBCS
PLATELET # BLD AUTO: 297 THOUSANDS/UL (ref 149–390)
PMV BLD AUTO: 9.3 FL (ref 8.9–12.7)
POTASSIUM SERPL-SCNC: 4.3 MMOL/L (ref 3.5–5.3)
RBC # BLD AUTO: 2.97 MILLION/UL (ref 3.81–5.12)
SODIUM SERPL-SCNC: 141 MMOL/L (ref 135–147)
UNIT DISPENSE STATUS: NORMAL
UNIT PRODUCT CODE: NORMAL
UNIT PRODUCT VOLUME: 350 ML
UNIT RH: NORMAL
WBC # BLD AUTO: 8.97 THOUSAND/UL (ref 4.31–10.16)

## 2024-05-14 PROCEDURE — 99232 SBSQ HOSP IP/OBS MODERATE 35: CPT | Performed by: FAMILY MEDICINE

## 2024-05-14 PROCEDURE — 94664 DEMO&/EVAL PT USE INHALER: CPT

## 2024-05-14 PROCEDURE — 97116 GAIT TRAINING THERAPY: CPT

## 2024-05-14 PROCEDURE — 80048 BASIC METABOLIC PNL TOTAL CA: CPT | Performed by: FAMILY MEDICINE

## 2024-05-14 PROCEDURE — 85025 COMPLETE CBC W/AUTO DIFF WBC: CPT | Performed by: STUDENT IN AN ORGANIZED HEALTH CARE EDUCATION/TRAINING PROGRAM

## 2024-05-14 PROCEDURE — 94640 AIRWAY INHALATION TREATMENT: CPT

## 2024-05-14 PROCEDURE — 94760 N-INVAS EAR/PLS OXIMETRY 1: CPT

## 2024-05-14 PROCEDURE — 97110 THERAPEUTIC EXERCISES: CPT

## 2024-05-14 RX ADMIN — PANTOPRAZOLE SODIUM 40 MG: 40 TABLET, DELAYED RELEASE ORAL at 08:23

## 2024-05-14 RX ADMIN — METOPROLOL SUCCINATE 100 MG: 100 TABLET, EXTENDED RELEASE ORAL at 08:23

## 2024-05-14 RX ADMIN — CARIPRAZINE 1.5 MG: 1.5 CAPSULE, GELATIN COATED ORAL at 08:24

## 2024-05-14 RX ADMIN — ASPIRIN 81 MG: 81 TABLET, COATED ORAL at 08:23

## 2024-05-14 RX ADMIN — IPRATROPIUM BROMIDE 0.5 MG: 0.5 SOLUTION RESPIRATORY (INHALATION) at 20:13

## 2024-05-14 RX ADMIN — IPRATROPIUM BROMIDE 0.5 MG: 0.5 SOLUTION RESPIRATORY (INHALATION) at 16:11

## 2024-05-14 RX ADMIN — AMLODIPINE BESYLATE 10 MG: 10 TABLET ORAL at 08:23

## 2024-05-14 RX ADMIN — Medication 2.5 MG: at 22:02

## 2024-05-14 RX ADMIN — IPRATROPIUM BROMIDE 0.5 MG: 0.5 SOLUTION RESPIRATORY (INHALATION) at 07:58

## 2024-05-14 RX ADMIN — BUDESONIDE AND FORMOTEROL FUMARATE DIHYDRATE 2 PUFF: 80; 4.5 AEROSOL RESPIRATORY (INHALATION) at 08:27

## 2024-05-14 RX ADMIN — GABAPENTIN 100 MG: 100 CAPSULE ORAL at 22:00

## 2024-05-14 RX ADMIN — CEFEPIME 1000 MG: 2 INJECTION, POWDER, FOR SOLUTION INTRAVENOUS at 08:27

## 2024-05-14 RX ADMIN — BUPROPION HYDROCHLORIDE 75 MG: 75 TABLET, FILM COATED ORAL at 08:23

## 2024-05-14 RX ADMIN — VILAZODONE HYDROCHLORIDE 40 MG: 40 TABLET, FILM COATED ORAL at 08:27

## 2024-05-14 RX ADMIN — BUDESONIDE AND FORMOTEROL FUMARATE DIHYDRATE 2 PUFF: 80; 4.5 AEROSOL RESPIRATORY (INHALATION) at 18:06

## 2024-05-14 NOTE — ASSESSMENT & PLAN NOTE
Resolved  C/c : Presented to the ER with lethargy and fevers for several days.  Septic shock as evidenced by tachycardia/tachypnea/leukocytosis/evidence of hypotension, requiring ICU admission for pressors.  Likely due to pyelonephritis/pneumonia seen on CT imaging  S/p fluid resuscitation  S/p IV antibiotics  2/2 blood cultures growing pansensitive E. coli.  MRSA Negative, strep pneumoniae/legionella negative.  Initially was given cefepime and vancomycin.  Subsequently on IV ceftriaxone.  Now hemodynamically stable.

## 2024-05-14 NOTE — PHYSICAL THERAPY NOTE
"                                                                                  PHYSICAL THERAPY NOTE    Patient Name: Lauren Quintero  Today's Date: 5/14/2024 05/14/24 1446   PT Last Visit   PT Visit Date 05/14/24   Note Type   Note Type Treatment   Pain Assessment   Pain Assessment Tool 0-10   Pain Score No Pain   Restrictions/Precautions   Weight Bearing Precautions Per Order No   Other Precautions Chair Alarm;Bed Alarm;Multiple lines;Telemetry;Fall Risk;O2  (2-3L via NC)   General   Chart Reviewed Yes   Response to Previous Treatment Patient with no complaints from previous session.   Family/Caregiver Present No   Cognition   Overall Cognitive Status WFL   Arousal/Participation Alert;Responsive;Cooperative   Attention Attends with cues to redirect   Orientation Level Oriented to person;Oriented to place;Oriented to situation   Memory Within functional limits   Following Commands Follows one step commands with increased time or repetition   Comments Pt agreeable to PT session, of note pt is [place]   Subjective   Subjective \"It's just the breathing that is hard\"   Bed Mobility   Sit to Supine 5  Supervision   Additional items Assist x 1;Bedrails;HOB elevated;Increased time required;Verbal cues   Additional Comments pt greeted in chair   Transfers   Sit to Stand 4  Minimal assistance   Additional items Assist x 1;Armrests;Increased time required;Verbal cues   Stand to Sit 4  Minimal assistance   Additional items Assist x 1;Armrests;Increased time required;Verbal cues   Additional Comments with RW   Ambulation/Elevation   Gait pattern Improper Weight shift;Narrow RACHAEL;Short stride;Excessively slow;Step to   Gait Assistance 4  Minimal assist   Additional items Assist x 1;Verbal cues   Assistive Device Rolling walker   Distance 15'  (3 sets of 5' forward/ backward with seated rest break after each set)   Balance   Static Sitting Fair +   Dynamic Sitting Fair   Static Standing Fair -   Dynamic Standing Poor + "   Ambulatory Poor +   Endurance Deficit   Endurance Deficit Yes   Endurance Deficit Description weakness and SOB  (with ambulation O2 levels would decrease to 80s, levels would quickly increase to high 90s once stationary/resting)   Activity Tolerance   Activity Tolerance Patient limited by fatigue   Nurse Made Aware Spoke with YASIR Martell about outcomes of sesion   Exercises   Quad Sets Sitting;20 reps;AROM;Bilateral   Glute Sets Sitting;20 reps;AROM;Bilateral   Hip Abduction Sitting;20 reps;AROM;Bilateral   Knee AROM Long Arc Quad Sitting;20 reps;AROM;Bilateral   Ankle Pumps Sitting;20 reps;AROM;Bilateral   Marching Sitting;20 reps;AROM;Bilateral   Assessment   Prognosis Good   Problem List Decreased strength;Decreased endurance;Impaired balance;Decreased mobility;Pain   Assessment Pt seen for PT treatment session this date, consisting of ther ex focused on strengthening and gt training on level surfaces to improve pt safety in household environment. Since previous session, pt has made good progress in terms of increased distance ambulated. Pt tolerated 3 sets of walking 5' forward and backward with RW and min A x1, followed by seated rest breaks between sets. Pt tolerated all seated exercises but required verbal cue to stay on task. Pt demonstrated fatigue during the final exercises of the session. Pt request to get back into bed at conclusion of session and required min A x 1 to transfer from chair to bed, and required supervision to transfer from sit to supine with verbal cues. Pertinent barriers during this session include pain and SOB. Current goals and POC established on IE remain appropriate, pt continues to have rehab potential and is making good progress towards STGs. Pt prognosis for achieving goals is good, pending pt progress with hospitalization/medical status improvements, and indicated by ability to follow directions, previous response to intervention, and responsive to cues/strategies. Pt limited d/t  fear of falling. Pt continues to be functioning below baseline level, and remains limited 2* factors listed above. PT will continue to see pt during current hospitalization in order to address the deficits listed above and provide interventions consistent w/ POC in effort to achieve STGs. PT recommends level 2, moderate resource intensity upon discharge.   Barriers to Discharge None   Goals   Patient Goals to get stronger   STG Expiration Date 05/17/24   Short Term Goal #1 Progressing towards goals   PT Treatment Day 2   Plan   Treatment/Interventions Functional transfer training;LE strengthening/ROM;Elevations;Therapeutic exercise;Endurance training;Patient/family training;Equipment eval/education;Bed mobility;Gait training   Progress Slow progress, decreased activity tolerance   PT Frequency 3-5x/wk   Discharge Recommendation   Rehab Resource Intensity Level, PT II (Moderate Resource Intensity)   Equipment Recommended Walker   AM-PAC Basic Mobility Inpatient   Turning in Flat Bed Without Bedrails 3   Lying on Back to Sitting on Edge of Flat Bed Without Bedrails 3   Moving Bed to Chair 3   Standing Up From Chair Using Arms 3   Walk in Room 3   Climb 3-5 Stairs With Railing 2   Basic Mobility Inpatient Raw Score 17   Basic Mobility Standardized Score 39.67   [facility] Highest Level Of Mobility   -HLM Goal 5: Stand one or more mins   -HLM Achieved 6: Walk 10 steps or more   Education   Education Provided Mobility training;Home exercise program;Assistive device   Patient Demonstrates acceptance/verbal understanding   End of Consult   Patient Position at End of Consult Supine;Bed/Chair alarm activated;All needs within reach       Abhi Diane, PT, DPT

## 2024-05-14 NOTE — ASSESSMENT & PLAN NOTE
Patient does have history of hypertension, takes Toprol XL/Entresto at home.  Shock is now resolved and BP stable  On Toprol XL amlodipine  Hold Entresto and Lasix for now in the setting of resolving DARLING

## 2024-05-14 NOTE — PROGRESS NOTES
Replaced by [facility]  Progress Note  Name: Lauren Quintero I  MRN: [id number]  Unit/Bed#: -01 I Date of Admission: 5/2/2024   Date of Service: 5/14/2024 I Hospital Day: 12    Assessment/Plan   Acute on chronic respiratory failure with hypoxia (HCC)  Assessment & Plan  Chronically wears 4 L NC at baseline  Recent history of COVID-19 infection in  02/2024  CT chest Persistent multifocal consolidations may represent sequela of prior pneumonia. However, recurrent pneumonia or chronic infectious or inflammatory process is not excluded   S/p iv antibiotics  Plan to dc on levofloxacin 750 mg every 48 hourly for total of 7 days.     * Septic shock (HCC)  Assessment & Plan  Resolved  C/c : Presented to the ER with lethargy and fevers for several days.  Septic shock as evidenced by tachycardia/tachypnea/leukocytosis/evidence of hypotension, requiring ICU admission for pressors.  Likely due to pyelonephritis/pneumonia seen on CT imaging  S/p fluid resuscitation  S/p IV antibiotics  2/2 blood cultures growing pansensitive E. coli.  MRSA Negative, strep pneumoniae/legionella negative.  Initially was given cefepime and vancomycin.  Subsequently on IV ceftriaxone.  Now hemodynamically stable.      Acute kidney injury superimposed on chronic kidney disease  (HCC)  Assessment & Plan  Present on admission, evidenced by a creatinine of 2.99 with a baseline of 0.7-1.1  Etiology likely multifactorial, secondary to poor oral intake as well as ATN from septic shock/hypotension.  Trended down to 1.7 today could be a new baseline renal functions  Encourage p.o. fluids  Monitor    Chronic combined systolic and diastolic CHF (congestive heart failure) (HCC)  Assessment & Plan  History of combined CHF with the most recent EF of 35%  Pt took off Life vest : reports she will wear at home.  Continue telemetry monitor.  GDMT includes Toprol XL, Entresto  Resume Toprol-XL, continue to hold Entresto  Monitor strict  intake/output  Monitor weights    COPD (chronic obstructive pulmonary disease) ([facility])  Assessment & Plan  Noted history; + smoker  Follows with Pulmonology OP  Chonically on 4L nc at home.  Takes Advair at home.Continue nebs.  Monitor respiratory status closely    Anemia  Assessment & Plan  Noted history  EGD in 2023 Mild erosive gastritis and Duodenal bulb angiectasia status post APC  Colonoscopy in  2023 Left-sided diverticulosis with old blood clots likely indicating a diverticular bleed   Had Normal out pt video capsule endoscopy.   Patient hemoglobin trended down to 8.9, monitor. No active bleeding noted      Coronary artery disease of native artery of native heart with stable angina pectoris ([facility])  Assessment & Plan  Continue with home medications - Aspirin, pravastatin, metoprolol  History of Multivessel Disease as noted in 2/24; declined CABG.      Essential hypertension  Assessment & Plan  Patient does have history of hypertension, takes Toprol XL/Entresto at home.  Shock is now resolved and BP stable  On Toprol XL amlodipine  Hold Entresto and Lasix for now in the setting of resolving DARLING    Major depressive disorder, recurrent episode, moderate ([facility])  Assessment & Plan  reports patient taking multiple agents for depression  Continue with Vrylar, and Viibryd  Currently receiving Wellbutrin, other medications are non-formulary.      Ambulatory dysfunction  Assessment & Plan  Uses a walker for assistance at baseline  PT/OT following; snf recommendation                 Mobility:   Basic Mobility Inpatient Raw Score: 12  JH-HLM Goal: 4: Move to chair/commode  JH-HLM Achieved: 2: Bed activities/Dependent transfer  JH-HLM Goal NOT achieved. Continue with multidisciplinary rounding and encourage appropriate mobility to improve upon JH-HLM goals.    Patient Centered Rounds: I performed bedside rounds with nursing staff today.   Discussions with Specialists or Other Care Team Provider: cm    Education and  Discussions with Family / Patient: patient    Total Time Spent on Date of Encounter in care of patient: 35 mins. This time was spent on one or more of the following: performing physical exam; counseling and coordination of care; obtaining or reviewing history; documenting in the medical record; reviewing/ordering tests, medications or procedures; communicating with other healthcare professionals and discussing with patient's family/caregivers.    Current Length of Stay: 12 day(s)  Current Patient Status: Inpatient   Certification Statement: The patient will continue to require additional inpatient hospital stay due to ashwin monitoring  Discharge Plan: likely tomorrow  Code Status: Level 1 - Full Code    Subjective:   Overall reports feeling much better  On 4 L which is her baseline  Denies chest pain shortness of breath nausea vomiting abdominal pain  No other acute concerns.    Objective:     Vitals:   Temp (24hrs), Av.9 °F (36.6 °C), Min:97.4 °F (36.3 °C), Max:98.9 °F (37.2 °C)    Temp:  [97.4 °F (36.3 °C)-98.9 °F (37.2 °C)] 98.3 °F (36.8 °C)  HR:  [] 90  Resp:  [16-20] 18  BP: (114-129)/(62-70) 120/65  SpO2:  [94 %-100 %] 95 %  Body mass index is 17.54 kg/m².     Input and Output Summary (last 24 hours):     Intake/Output Summary (Last 24 hours) at 2024 1457  Last data filed at 2024 0500  Gross per 24 hour   Intake 975 ml   Output 600 ml   Net 375 ml       Physical Exam:   Physical Exam  Constitutional:       General: She is not in acute distress.     Appearance: She is not toxic-appearing.   HENT:      Mouth/Throat:      Mouth: Mucous membranes are moist.      Pharynx: Oropharynx is clear.   Cardiovascular:      Rate and Rhythm: Normal rate and regular rhythm.      Pulses: Normal pulses.   Pulmonary:      Effort: Pulmonary effort is normal. No respiratory distress.      Breath sounds: Normal breath sounds.   Abdominal:      General: Abdomen is flat. Bowel sounds are normal.      Palpations:  Abdomen is soft.   Musculoskeletal:      Right lower leg: No edema.      Left lower leg: No edema.   Neurological:      Mental Status: She is alert and oriented to person, place, and time.          Additional Data:     Labs:  Results from last 7 days   Lab Units 05/14/24  0836   WBC Thousand/uL 8.97   HEMOGLOBIN g/dL 8.9*   HEMATOCRIT % 28.1*   PLATELETS Thousands/uL 297   SEGS PCT % 73   LYMPHO PCT % 13*   MONO PCT % 13*   EOS PCT % 1     Results from last 7 days   Lab Units 05/14/24  0836   SODIUM mmol/L 141   POTASSIUM mmol/L 4.3   CHLORIDE mmol/L 105   CO2 mmol/L 32   BUN mg/dL 30*   CREATININE mg/dL 1.50*   ANION GAP mmol/L 4   CALCIUM mg/dL 9.3   GLUCOSE RANDOM mg/dL 83                 Results from last 7 days   Lab Units 05/08/24  0834   PROCALCITONIN ng/ml 4.15*       Lines/Drains:  Invasive Devices       Peripheral Intravenous Line  Duration             Peripheral IV 05/13/24 Distal;Dorsal (posterior);Right Forearm 1 day              Drain  Duration             External Urinary Catheter <1 day                      Telemetry:  Telemetry Orders (From admission, onward)               LifeVest Patient: Continuous Telemetry Monitoring during hospitalization (non-expiring)  (Lifevest Evaluation for removal/continue)  Continuous LifeVest Telemetry Monitoring        References:    LifeVest Policy                                  Recent Cultures (last 7 days):   Results from last 7 days   Lab Units 05/08/24  0859   BLOOD CULTURE  No Growth After 5 Days.  No Growth After 5 Days.       Last 24 Hours Medication List:   Current Facility-Administered Medications   Medication Dose Route Frequency Provider Last Rate    acetaminophen  650 mg Oral Q6H PRN Allison Susanfidelina Julian, DO      albuterol  2.5 mg Nebulization Q4H PRN Allison Susanfidelina Julian, DO      amLODIPine  10 mg Oral Daily Allison Susan Eliel, DO      aspirin  81 mg Oral Daily Allisonzachery Julian, DO      budesonide-formoterol  2 puff Inhalation BID  Clayton Ritchie MD      buPROPion  75 mg Oral Daily Allisonzachery Julian, DO      cariprazine  1.5 mg Oral Daily Clayton Ritchie MD      gabapentin  100 mg Oral HS Allison Julian, DO      ipratropium  0.5 mg Nebulization TID Allison Julian, DO      melatonin  4.5 mg Oral HS Allison Julian, DO      metoprolol succinate  100 mg Oral Daily Allison Julian, DO      oxyCODONE  2.5 mg Oral Q6H PRN Clayton Ritchie MD      pantoprazole  40 mg Oral Daily Allison Julian, DO      vilazodone  40 mg Oral Daily Clayton Ritchie MD          Today, Patient Was Seen By: Lexie Richter MD    **Please Note: This note may have been constructed using a voice recognition system.**

## 2024-05-14 NOTE — RESPIRATORY THERAPY NOTE
Per voicemail message at 12:24 p.m.  Patient requests call back from Nurse.  No further details given on voicemail.   RT Protocol Note  Lauren Quintero 78 y.o. female MRN: [id number]  Unit/Bed#: -01 Encounter: [id number]    Assessment    Principal Problem:    Septic shock (HCC)  Active Problems:    Ambulatory dysfunction    Essential hypertension    Acute on chronic respiratory failure with hypoxia (HCC)    Coronary artery disease of native artery of native heart with stable angina pectoris ([facility])    Major depressive disorder, recurrent episode, moderate (HCC)    COPD (chronic obstructive pulmonary disease) ([facility])    Anemia    Chronic combined systolic and diastolic CHF (congestive heart failure) ([facility])    Acute kidney injury superimposed on chronic kidney disease  ([facility])      Home Pulmonary Medications:     05/13/24 2001   Respiratory Protocol   Protocol Initiated? No   Protocol Selection Respiratory   Language Barrier? No   Medical & Social History Reviewed? Yes   Diagnostic Studies Reviewed? Yes   Physical Assessment Performed? Yes   Home Devices/Therapy Home O2   Respiratory Plan No distress/Pulmonary history   Respiratory Assessment   Assessment Type During-treatment   General Appearance Alert;Awake   Respiratory Pattern Normal   Chest Assessment Chest expansion symmetrical   Bilateral Breath Sounds Diminished   Cough None   Resp Comments Will continue with current tx plan   O2 Device NC   Additional Assessments   Pulse 104   Respirations 20   SpO2 98 %       Home Devices/Therapy: Home O2    Past Medical History:   Diagnosis Date    Acute congestive heart failure (HCC) 08/27/2021    Anxiety     Cardiac disease     Chest pain 08/27/2021    Chronic pain disorder     COPD (chronic obstructive pulmonary disease) ([facility])     COVID-19 02/15/2024    Depression     Heart disease     Hyperlipidemia     Hypertension     MI (myocardial infarction) ([facility])     MRSA (methicillin resistant Staphylococcus aureus)     Renal disorder     benign kidney tumor     Social History     Socioeconomic History    Marital status:      Spouse name:  None    Number of children: 3    Years of education: 13    Highest education level: High school graduate   Occupational History    Occupation:      Employer: [employer]     Comment: retired    Tobacco Use    Smoking status: Former     Current packs/day: 0.00     Average packs/day: 1 pack/day for 60.0 years (60.0 ttl pk-yrs)     Types: Cigarettes     Start date:      Quit date: 2016     Years since quittin.3    Smokeless tobacco: Never    Tobacco comments:     She has close to a 60 pack-year smoking history, most recently has cut down to 4-5 cigarettes daily   Vaping Use    Vaping status: Never Used   Substance and Sexual Activity    Alcohol use: Never    Drug use: No    Sexual activity: Not Currently   Other Topics Concern    None   Social History Narrative    None     Social Determinants of Health     Financial Resource Strain: Patient Declined (2024)    Received from [facility] [place]    Overall Financial Resource Strain (CARDIA)     Difficulty of Paying Living Expenses: Patient declined   Food Insecurity: No Food Insecurity (5/3/2024)    Hunger Vital Sign     Worried About Running Out of Food in the Last Year: Never true     Ran Out of Food in the Last Year: Never true   Transportation Needs: No Transportation Needs (5/3/2024)    PRAPARE - Transportation     Lack of Transportation (Medical): No     Lack of Transportation (Non-Medical): No   Physical Activity: Patient Declined (2024)    Received from [facility] [employer]    Exercise Vital Sign     Days of Exercise per Week: Patient declined     Minutes of Exercise per Session: Patient declined   Stress: Patient Declined (2024)    Received from [facility]    [place] [employer] - Occupational Stress Questionnaire     Feeling of Stress : Patient declined   Social Connections: Patient Declined (2024)    Received from [facility]    Social Connection and Isolation Panel [NHANES]      "Frequency of Communication with Friends and Family: Patient declined     Frequency of Social Gatherings with Friends and Family: Patient declined     Attends [employer] Services: Patient declined     Active Member of Clubs or Organizations: Patient declined     Attends Club or Organization Meetings: Patient declined     Marital Status: Patient declined   Intimate Partner Violence: Patient Declined (1/6/2024)    Received from [facility]    Humiliation, Afraid, Rape, and Kick questionnaire     Fear of Current or Ex-Partner: Patient declined     Emotionally Abused: Patient declined     Physically Abused: Patient declined     Sexually Abused: Patient declined   Housing Stability: Low Risk  (5/3/2024)    Housing Stability Vital Sign     Unable to Pay for Housing in the Last Year: No     Number of Places Lived in the Last Year: 1     Unstable Housing in the Last Year: No       Subjective         Objective    Physical Exam:   Assessment Type: During-treatment  General Appearance: Alert, Awake  Respiratory Pattern: Normal  Chest Assessment: Chest expansion symmetrical  Bilateral Breath Sounds: Diminished  Cough: None  O2 Device: NC    Vitals:  Blood pressure 125/66, pulse 104, temperature 98.9 °F (37.2 °C), resp. rate 20, height 5' 7\" (1.702 m), weight 50.8 kg (111 lb 15.9 oz), SpO2 98%, not currently breastfeeding.          Imaging and other studies: I have personally reviewed pertinent reports.      O2 Device: NC     Plan    Respiratory Plan: No distress/Pulmonary history        Resp Comments: Will continue with current tx plan   "

## 2024-05-14 NOTE — ASSESSMENT & PLAN NOTE
Chronically wears 4 L NC at baseline  Recent history of COVID-19 infection in  02/2024  CT chest Persistent multifocal consolidations may represent sequela of prior pneumonia. However, recurrent pneumonia or chronic infectious or inflammatory process is not excluded   S/p iv antibiotics  Plan to dc on levofloxacin 750 mg every 48 hourly for total of 7 days.

## 2024-05-14 NOTE — ASSESSMENT & PLAN NOTE
Present on admission, evidenced by a creatinine of 2.99 with a baseline of 0.7-1.1  Etiology likely multifactorial, secondary to poor oral intake as well as ATN from septic shock/hypotension.  Trended down to 1.7 today could be a new baseline renal functions  Encourage p.o. fluids  Monitor

## 2024-05-14 NOTE — ASSESSMENT & PLAN NOTE
reports patient taking multiple agents for depression  Continue with Vrylar, and Viibryd  Currently receiving Wellbutrin, other medications are non-formulary.

## 2024-05-14 NOTE — RESPIRATORY THERAPY NOTE
RT Protocol Note  Lauren Quintero 78 y.o. female MRN: [id number]  Unit/Bed#: -01 Encounter: [id number]    Assessment    Principal Problem:    Septic shock ([facility])  Active Problems:    Ambulatory dysfunction    Essential hypertension    Acute on chronic respiratory failure with hypoxia ([facility])    Coronary artery disease of native artery of native heart with stable angina pectoris ([facility])    Major depressive disorder, recurrent episode, moderate ([facility])    COPD (chronic obstructive pulmonary disease) ([facility])    Anemia    Chronic combined systolic and diastolic CHF (congestive heart failure) ([facility])    Acute kidney injury superimposed on chronic kidney disease  ([facility])      Home Pulmonary Medications:    Home Devices/Therapy: Home O2    Past Medical History:   Diagnosis Date    Acute congestive heart failure ([facility]) 2021    Anxiety     Cardiac disease     Chest pain 2021    Chronic pain disorder     COPD (chronic obstructive pulmonary disease) ([facility])     COVID-19 02/15/2024    Depression     Heart disease     Hyperlipidemia     Hypertension     MI (myocardial infarction) ([facility])     MRSA (methicillin resistant Staphylococcus aureus)     Renal disorder     benign kidney tumor     Social History     Socioeconomic History    Marital status:      Spouse name: None    Number of children: 3    Years of education: 13    Highest education level: High school graduate   Occupational History    Occupation: [employer]     Employer: [employer]     Comment: retired    Tobacco Use    Smoking status: Former     Current packs/day: 0.00     Average packs/day: 1 pack/day for 60.0 years (60.0 ttl pk-yrs)     Types: Cigarettes     Start date:      Quit date: 2016     Years since quittin.3    Smokeless tobacco: Never    Tobacco comments:     She has close to a 60 pack-year smoking history, most recently has cut down to 4-5 cigarettes daily   Vaping Use    Vaping status: Never Used   Substance and Sexual  Activity    Alcohol use: Never    Drug use: No    Sexual activity: Not Currently   Other Topics Concern    None   Social History Narrative    None     Social Determinants of Health     Financial Resource Strain: Patient Declined (1/6/2024)    Received from [facility]    Overall Financial Resource Strain (CARDIA)     Difficulty of Paying Living Expenses: Patient declined   Food Insecurity: No Food Insecurity (5/3/2024)    Hunger Vital Sign     Worried About Running Out of Food in the Last Year: Never true     Ran Out of Food in the Last Year: Never true   Transportation Needs: No Transportation Needs (5/3/2024)    PRAPARE - Transportation     Lack of Transportation (Medical): No     Lack of Transportation (Non-Medical): No   Physical Activity: Patient Declined (1/6/2024)    Received from [facility]    Exercise Vital Sign     Days of Exercise per Week: Patient declined     Minutes of Exercise per Session: Patient declined   Stress: Patient Declined (1/6/2024)    Received from [facility]    [place] of Occupational [employer] - Occupational Stress Questionnaire     Feeling of Stress : Patient declined   Social Connections: Patient Declined (1/6/2024)    Received from [facility]    Social Connection and Isolation Panel [NHANES]     Frequency of Communication with Friends and Family: Patient declined     Frequency of Social Gatherings with Friends and Family: Patient declined     Attends Mormon Services: Patient declined     Active Member of Clubs or Organizations: Patient declined     Attends Club or Organization Meetings: Patient declined     Marital Status: Patient declined   Intimate Partner Violence: Patient Declined (1/6/2024)    Received from [facility]    Humiliation, Afraid, Rape, and Kick questionnaire     Fear of Current or Ex-Partner: Patient declined     Emotionally Abused: Patient declined     Physically Abused: Patient declined     Sexually Abused: Patient declined  "  Housing Stability: Low Risk  (5/3/2024)    Housing Stability Vital Sign     Unable to Pay for Housing in the Last Year: No     Number of Places Lived in the Last Year: 1     Unstable Housing in the Last Year: No       Subjective         Objective    Physical Exam:   Assessment Type: During-treatment  General Appearance: Drowsy, Eyes open/responds to voice  Respiratory Pattern: Normal  Chest Assessment: Chest expansion symmetrical  Bilateral Breath Sounds: Diminished  Cough: None  O2 Device: NC    Vitals:  Blood pressure 117/62, pulse 87, temperature 98.5 °F (36.9 °C), resp. rate (P) 20, height 5' 7\" (1.702 m), weight 50.8 kg (111 lb 15.9 oz), SpO2 98%, not currently breastfeeding.          Imaging and other studies: I have personally reviewed pertinent reports.      O2 Device: NC     Plan    Respiratory Plan: No distress/Pulmonary history        Resp Comments: Pt resting and in no apparent distress.  Will cont w/ current therapy.   "

## 2024-05-14 NOTE — ASSESSMENT & PLAN NOTE
Noted history; + smoker  Follows with Pulmonology OP  Chonically on 4L nc at home.  Takes Advair at home.Continue nebs.  Monitor respiratory status closely

## 2024-05-14 NOTE — ASSESSMENT & PLAN NOTE
Noted history  EGD in 2023 Mild erosive gastritis and Duodenal bulb angiectasia status post APC  Colonoscopy in  2023 Left-sided diverticulosis with old blood clots likely indicating a diverticular bleed   Had Normal out pt video capsule endoscopy.   Patient hemoglobin trended down to 8.9, monitor. No active bleeding noted

## 2024-05-14 NOTE — PLAN OF CARE
Problem: PAIN - ADULT  Goal: Verbalizes/displays adequate comfort level or baseline comfort level  Description: Interventions:  - Encourage patient to monitor pain and request assistance  - Assess pain using appropriate pain scale  - Administer analgesics based on type and severity of pain and evaluate response  - Implement non-pharmacological measures as appropriate and evaluate response  - Consider cultural and social influences on pain and pain management  - Notify physician/advanced practitioner if interventions unsuccessful or patient reports new pain  Outcome: Progressing     Problem: INFECTION - ADULT  Goal: Absence or prevention of progression during hospitalization  Description: INTERVENTIONS:  - Assess and monitor for signs and symptoms of infection  - Monitor lab/diagnostic results  - Monitor all insertion sites, i.e. indwelling lines, tubes, and drains  - Monitor endotracheal if appropriate and nasal secretions for changes in amount and color  - [place] appropriate cooling/warming therapies per order  - Administer medications as ordered  - Instruct and encourage patient and family to use good hand hygiene technique  - Identify and instruct in appropriate isolation precautions for identified infection/condition  Outcome: Progressing

## 2024-05-15 VITALS
DIASTOLIC BLOOD PRESSURE: 60 MMHG | BODY MASS INDEX: 17.51 KG/M2 | TEMPERATURE: 97.6 F | SYSTOLIC BLOOD PRESSURE: 112 MMHG | HEIGHT: 67 IN | WEIGHT: 111.55 LBS | OXYGEN SATURATION: 96 % | HEART RATE: 95 BPM | RESPIRATION RATE: 18 BRPM

## 2024-05-15 LAB
ANION GAP SERPL CALCULATED.3IONS-SCNC: 6 MMOL/L (ref 4–13)
BASOPHILS # BLD AUTO: 0.05 THOUSANDS/ÂΜL (ref 0–0.1)
BASOPHILS NFR BLD AUTO: 1 % (ref 0–1)
BUN SERPL-MCNC: 32 MG/DL (ref 5–25)
CALCIUM SERPL-MCNC: 9.1 MG/DL (ref 8.4–10.2)
CHLORIDE SERPL-SCNC: 104 MMOL/L (ref 96–108)
CO2 SERPL-SCNC: 30 MMOL/L (ref 21–32)
CREAT SERPL-MCNC: 1.54 MG/DL (ref 0.6–1.3)
EOSINOPHIL # BLD AUTO: 0.13 THOUSAND/ÂΜL (ref 0–0.61)
EOSINOPHIL NFR BLD AUTO: 1 % (ref 0–6)
ERYTHROCYTE [DISTWIDTH] IN BLOOD BY AUTOMATED COUNT: 15.5 % (ref 11.6–15.1)
GFR SERPL CREATININE-BSD FRML MDRD: 32 ML/MIN/1.73SQ M
GLUCOSE SERPL-MCNC: 88 MG/DL (ref 65–140)
HCT VFR BLD AUTO: 28.3 % (ref 34.8–46.1)
HGB BLD-MCNC: 8.7 G/DL (ref 11.5–15.4)
IMM GRANULOCYTES # BLD AUTO: 0.05 THOUSAND/UL (ref 0–0.2)
IMM GRANULOCYTES NFR BLD AUTO: 1 % (ref 0–2)
LYMPHOCYTES # BLD AUTO: 1.53 THOUSANDS/ÂΜL (ref 0.6–4.47)
LYMPHOCYTES NFR BLD AUTO: 16 % (ref 14–44)
MCH RBC QN AUTO: 29.5 PG (ref 26.8–34.3)
MCHC RBC AUTO-ENTMCNC: 30.7 G/DL (ref 31.4–37.4)
MCV RBC AUTO: 96 FL (ref 82–98)
MONOCYTES # BLD AUTO: 1.3 THOUSAND/ÂΜL (ref 0.17–1.22)
MONOCYTES NFR BLD AUTO: 14 % (ref 4–12)
NEUTROPHILS # BLD AUTO: 6.54 THOUSANDS/ÂΜL (ref 1.85–7.62)
NEUTS SEG NFR BLD AUTO: 67 % (ref 43–75)
NRBC BLD AUTO-RTO: 0 /100 WBCS
PLATELET # BLD AUTO: 350 THOUSANDS/UL (ref 149–390)
PMV BLD AUTO: 9.4 FL (ref 8.9–12.7)
POTASSIUM SERPL-SCNC: 4.3 MMOL/L (ref 3.5–5.3)
RBC # BLD AUTO: 2.95 MILLION/UL (ref 3.81–5.12)
SODIUM SERPL-SCNC: 140 MMOL/L (ref 135–147)
WBC # BLD AUTO: 9.6 THOUSAND/UL (ref 4.31–10.16)

## 2024-05-15 PROCEDURE — 94640 AIRWAY INHALATION TREATMENT: CPT

## 2024-05-15 PROCEDURE — 94760 N-INVAS EAR/PLS OXIMETRY 1: CPT

## 2024-05-15 PROCEDURE — 99239 HOSP IP/OBS DSCHRG MGMT >30: CPT | Performed by: FAMILY MEDICINE

## 2024-05-15 PROCEDURE — 85025 COMPLETE CBC W/AUTO DIFF WBC: CPT | Performed by: FAMILY MEDICINE

## 2024-05-15 PROCEDURE — 80048 BASIC METABOLIC PNL TOTAL CA: CPT | Performed by: FAMILY MEDICINE

## 2024-05-15 RX ORDER — AMLODIPINE BESYLATE 10 MG/1
10 TABLET ORAL DAILY
Start: 2024-05-15

## 2024-05-15 RX ORDER — METOPROLOL SUCCINATE 100 MG/1
100 TABLET, EXTENDED RELEASE ORAL DAILY
Start: 2024-05-15

## 2024-05-15 RX ORDER — BUDESONIDE AND FORMOTEROL FUMARATE DIHYDRATE 80; 4.5 UG/1; UG/1
2 AEROSOL RESPIRATORY (INHALATION) 2 TIMES DAILY
Qty: 10.2 G | Refills: 0
Start: 2024-05-15

## 2024-05-15 RX ADMIN — BUDESONIDE AND FORMOTEROL FUMARATE DIHYDRATE 2 PUFF: 80; 4.5 AEROSOL RESPIRATORY (INHALATION) at 10:32

## 2024-05-15 RX ADMIN — AMLODIPINE BESYLATE 10 MG: 10 TABLET ORAL at 10:30

## 2024-05-15 RX ADMIN — ASPIRIN 81 MG: 81 TABLET, COATED ORAL at 10:30

## 2024-05-15 RX ADMIN — BUPROPION HYDROCHLORIDE 75 MG: 75 TABLET, FILM COATED ORAL at 10:30

## 2024-05-15 RX ADMIN — METOPROLOL SUCCINATE 100 MG: 100 TABLET, EXTENDED RELEASE ORAL at 10:30

## 2024-05-15 RX ADMIN — CARIPRAZINE 1.5 MG: 1.5 CAPSULE, GELATIN COATED ORAL at 10:31

## 2024-05-15 RX ADMIN — PANTOPRAZOLE SODIUM 40 MG: 40 TABLET, DELAYED RELEASE ORAL at 10:30

## 2024-05-15 RX ADMIN — VILAZODONE HYDROCHLORIDE 40 MG: 40 TABLET, FILM COATED ORAL at 10:33

## 2024-05-15 RX ADMIN — IPRATROPIUM BROMIDE 0.5 MG: 0.5 SOLUTION RESPIRATORY (INHALATION) at 07:26

## 2024-05-15 NOTE — ASSESSMENT & PLAN NOTE
Patient does have history of hypertension, takes Toprol XL/Entresto at home.  Shock is now resolved and BP stable  On Toprol XL amlodipine  Resume Entresto on dc

## 2024-05-15 NOTE — RESPIRATORY THERAPY NOTE
RT Protocol Note  Lauren Quintero 78 y.o. female MRN: [id number]  Unit/Bed#: -01 Encounter: [id number]    Assessment    Principal Problem:    Septic shock ([facility])  Active Problems:    Ambulatory dysfunction    Essential hypertension    Acute on chronic respiratory failure with hypoxia ([facility])    Coronary artery disease of native artery of native heart with stable angina pectoris ([facility])    Major depressive disorder, recurrent episode, moderate ([facility])    COPD (chronic obstructive pulmonary disease) ([facility])    Anemia    Chronic combined systolic and diastolic CHF (congestive heart failure) ([facility])    Acute kidney injury superimposed on chronic kidney disease  ([facility])      Home Pulmonary Medications:    Home Devices/Therapy: Home O2 nebs TID    Past Medical History:   Diagnosis Date    Acute congestive heart failure ([facility]) 2021    Anxiety     Cardiac disease     Chest pain 2021    Chronic pain disorder     COPD (chronic obstructive pulmonary disease) ([facility])     COVID-19 02/15/2024    Depression     Heart disease     Hyperlipidemia     Hypertension     MI (myocardial infarction) ([facility])     MRSA (methicillin resistant Staphylococcus aureus)     Renal disorder     benign kidney tumor     Social History     Socioeconomic History    Marital status:      Spouse name: None    Number of children: 3    Years of education: 13    Highest education level: High school graduate   Occupational History    Occupation: [employer]     Employer: [employer]     Comment: retired    Tobacco Use    Smoking status: Former     Current packs/day: 0.00     Average packs/day: 1 pack/day for 60.0 years (60.0 ttl pk-yrs)     Types: Cigarettes     Start date:      Quit date: 2016     Years since quittin.3    Smokeless tobacco: Never    Tobacco comments:     She has close to a 60 pack-year smoking history, most recently has cut down to 4-5 cigarettes daily   Vaping Use    Vaping status: Never Used   Substance and  Sexual Activity    Alcohol use: Never    Drug use: No    Sexual activity: Not Currently   Other Topics Concern    None   Social History Narrative    None     Social Determinants of Health     Financial Resource Strain: Patient Declined (1/6/2024)    Received from [facility]    Overall Financial Resource Strain (CARDIA)     Difficulty of Paying Living Expenses: Patient declined   Food Insecurity: No Food Insecurity (5/3/2024)    Hunger Vital Sign     Worried About Running Out of Food in the Last Year: Never true     Ran Out of Food in the Last Year: Never true   Transportation Needs: No Transportation Needs (5/3/2024)    PRAPARE - Transportation     Lack of Transportation (Medical): No     Lack of Transportation (Non-Medical): No   Physical Activity: Patient Declined (1/6/2024)    Received from [facility]    Exercise Vital Sign     Days of Exercise per Week: Patient declined     Minutes of Exercise per Session: Patient declined   Stress: Patient Declined (1/6/2024)    Received from [facility]    [place] of Occupational Health - Occupational Stress Questionnaire     Feeling of Stress : Patient declined   Social Connections: Patient Declined (1/6/2024)    Received from [facility]    Social Connection and Isolation Panel [NHANES]     Frequency of Communication with Friends and Family: Patient declined     Frequency of Social Gatherings with Friends and Family: Patient declined     Attends Taoist Services: Patient declined     Active Member of Clubs or Organizations: Patient declined     Attends Club or Organization Meetings: Patient declined     Marital Status: Patient declined   Intimate Partner Violence: Patient Declined (1/6/2024)    Received from [facility]    Humiliation, Afraid, Rape, and Kick questionnaire     Fear of Current or Ex-Partner: Patient declined     Emotionally Abused: Patient declined     Physically Abused: Patient declined     Sexually Abused: Patient  "declined   Housing Stability: Low Risk  (5/3/2024)    Housing Stability Vital Sign     Unable to Pay for Housing in the Last Year: No     Number of Places Lived in the Last Year: 1     Unstable Housing in the Last Year: No       Subjective         Objective    Physical Exam:   Assessment Type: Pre-treatment  General Appearance: Alert, Awake  Respiratory Pattern: Shallow  Chest Assessment: Chest expansion symmetrical  Bilateral Breath Sounds: Diminished, Crackles  O2 Device: nc    Vitals:  Blood pressure 127/69, pulse 96, temperature 98.3 °F (36.8 °C), resp. rate 18, height 5' 7\" (1.702 m), weight 50.8 kg (111 lb 15.9 oz), SpO2 94%, not currently breastfeeding.          Imaging and other studies: I have personally reviewed pertinent reports.      O2 Device: nc     Plan    Respiratory Plan: Home Bronchodilator Patient pathway        Resp Comments: protocol complete. pt not in distress. lungs are diminished with crackles throughout. spo2 on 4L 98%. pt wears O2 at 4L at home and takes tx's TID at home. continue tx's as ordered.   "

## 2024-05-15 NOTE — CASE MANAGEMENT
Case Management Discharge Planning Note    Patient name Lauren Quintero  Location /-01 MRN [id number]  : 1945 Date 5/15/2024       Current Admission Date: 2024  Current Admission Diagnosis:Septic shock ([facility])   Patient Active Problem List    Diagnosis Date Noted Date Diagnosed    Acute kidney injury superimposed on chronic kidney disease  ([facility]) 2024     Septic shock ([facility]) 2024     DARLING (acute kidney injury) ([facility]) 2024     Diarrhea 2024     Pain 04/15/2024     Loose stools 2024     Insomnia 2024     Non-ST elevation MI (NSTEMI) ([facility]) 2024     Abnormal chest x-ray 2024     Chronic combined systolic and diastolic CHF (congestive heart failure) ([facility]) 2024     Elevated troponin 2024     CKD (chronic kidney disease) 2024     Dizziness 2023     Social problem 2023     Diverticulitis 10/29/2023     Moderate protein-calorie malnutrition ([facility]) 2023     BRBPR (bright red blood per rectum) 2023     Generalized weakness 2023     Staring episodes 2023     [place] macroglobulinemia ([facility]) 2023     Severe protein-calorie malnutrition ([facility]) 2021     Positive blood culture 2021     COPD (chronic obstructive pulmonary disease) ([facility]) 2021     Anemia 2021     Abnormal computed tomography angiography (CTA) 2021     Fatigue 2021     Major depressive disorder, recurrent episode, moderate ([facility]) 2019     Flank pain 2019     Coronary artery disease of native artery of native heart with stable angina pectoris ([facility]) 2018     Acute on chronic respiratory failure with hypoxia ([facility]) 2018     Ambulatory dysfunction 2018     Essential hypertension 2018     Tobacco abuse 2018       LOS (days): 13  Geometric Mean LOS (GMLOS) (days): 5.1  Days to GMLOS:-7.6     OBJECTIVE:  Risk of Unplanned Readmission Score: 43.55         Current admission  status: Inpatient   Preferred Pharmacy:   [id number] - [place] - [address]  [address]  [place] [address]  Phone: [phone] Fax: [phone]    [facility] [place] - [address]  [address]  [address]  [place] [address]  Phone: [phone] Fax: [phone]    [place] - [address]  [address] [address]  Phone: [phone] Fax: [phone]    [facility] [place] - [address]  [address] [place] [address]  Phone: [phone] Fax: [phone]    Primary Care Provider: Starla Bateman MD    Primary Insurance: MEDICARE  Secondary Insurance:     DISCHARGE DETAILS:    Discharge planning discussed with:: Pt  Freedom of Choice: Yes      IMM Given (Date):: 05/15/24  IMM Given to:: Patient (CM reviewed IMM with patient at bedside. Patient reported understanding their rights and denied any questions or concerns Patient was given a copy and pt refused to sign IMM. Refused copy was placed in medical records.)

## 2024-05-15 NOTE — DISCHARGE SUMMARY
[facility]  Discharge- Lauren Quintero 1945, 78 y.o. female MRN: [id number]  Unit/Bed#: -[facility] Encounter: [id number]  Primary Care Provider: Starla Bateman MD   Date and time admitted to hospital: 5/2/2024  7:01 PM    Acute on chronic respiratory failure with hypoxia (HCC)  Assessment & Plan  Chronically wears 4 L NC at baseline  Recent history of COVID-19 infection in  02/2024  CT chest Persistent multifocal consolidations may represent sequela of prior pneumonia. However, recurrent pneumonia or chronic infectious or inflammatory process is not excluded   S/p 7 days of iv antibiotics      * Septic shock (HCC)  Assessment & Plan  Resolved  C/c : Presented to the ER with lethargy and fevers for several days.  Septic shock as evidenced by tachycardia/tachypnea/leukocytosis/evidence of hypotension, requiring ICU admission for pressors.  Likely due to pyelonephritis/pneumonia seen on CT imaging  S/p fluid resuscitation  S/p IV antibiotics  2/2 blood cultures growing pansensitive E. coli.  MRSA Negative, strep pneumoniae/legionella negative.  Initially was given cefepime and vancomycin.  Subsequently on IV ceftriaxone.  Now hemodynamically stable.      Acute kidney injury superimposed on chronic kidney disease  (HCC)  Assessment & Plan  Present on admission, evidenced by a creatinine of 2.99 with a baseline of 0.7-1.1  Etiology likely multifactorial, secondary to poor oral intake as well as ATN from septic shock/hypotension.  Trended down to 1.5 today could be a new baseline renal functions  Encourage p.o. fluids      Chronic combined systolic and diastolic CHF (congestive heart failure) (HCC)  Assessment & Plan  History of combined CHF with the most recent EF of 35%  Pt took off Life vest : reports she will wear at home.  Continue telemetry monitor in hospital , on dc should have life vest in place  GDMT includes Toprol XL, Entresto      COPD (chronic obstructive pulmonary disease)  (HCC)  Assessment & Plan  Noted history; + smoker  Follows with Pulmonology OP  Chonically on 4L nc at home.  Takes Advair at home.Continue nebs.      Anemia  Assessment & Plan  Noted history  EGD in 2023 Mild erosive gastritis and Duodenal bulb angiectasia status post APC  Colonoscopy in  2023 Left-sided diverticulosis with old blood clots likely indicating a diverticular bleed   Had Normal out pt video capsule endoscopy.   Patient hemoglobin trended down to 8.7, monitor. No active bleeding noted      Coronary artery disease of native artery of native heart with stable angina pectoris (HCC)  Assessment & Plan  Continue with home medications - Aspirin, pravastatin, metoprolol  History of Multivessel Disease as noted in 2/24; declined CABG.      Essential hypertension  Assessment & Plan  Patient does have history of hypertension, takes Toprol XL/Entresto at home.  Shock is now resolved and BP stable  On Toprol XL amlodipine  Resume Entresto on dc    Major depressive disorder, recurrent episode, moderate (HCC)  Assessment & Plan  reports patient taking multiple agents for depression  Continue with Vrylar, and Viibryd  Currently receiving Wellbutrin, other medications are non-formulary.      Ambulatory dysfunction  Assessment & Plan  Uses a walker for assistance at baseline  PT/OT following; going to snf today      Discharging Physician / Practitioner: Lexie Richter MD  PCP: Starla Bateman MD  Admission Date:   Admission Orders (From admission, onward)       Ordered        05/02/24 2148  INPATIENT ADMISSION  Once                          Discharge Date: 05/15/24    Disposition:      SNF    Reason for Admission: septic shock    Discharge Diagnoses:     Please see assessment and plan section above for further details regarding discharge diagnoses.     Medical Problems       Resolved Problems  Date Reviewed: 5/6/2024   None         Medication Adjustments and Discharge Medications:  Summary of Medication Adjustments  made as a result of this hospitalization: see [facility] rec  Medication Dosing Tapers - Please refer to Discharge Medication List for details on any medication dosing tapers (if applicable to patient).  Medications being temporarily held (include recommended restart time): see med rec  Discharge Medication List: See after visit summary for reconciled discharge medications.       Diet Recommendations at Discharge:  Diet -        Diet Orders   (From admission, onward)                 Start     Ordered    05/04/24 0900  Diet Dysphagia/Modified Consistency; Dysphagia 3-Dental Soft; Thin Liquid  Diet effective now        References:    Adult Nutrition Support Algorithm    RD Therapeutic Diet Order Protocol   Question Answer Comment   Diet Type Dysphagia/Modified Consistency    Dysphagia/Modified Consistency Dysphagia 3-Dental Soft    Liquid Modifier Thin Liquid    RD to adjust diet per protocol? Yes        05/04/24 0859    05/03/24 1134  Dietary nutrition supplements  Once        Question Answer Comment   Select Supplement: Ensure Plus High Protein Chocolate    Frequency Breakfast        05/03/24 1133    05/03/24 1133  Dietary nutrition supplements  Once        Question Answer Comment   Select Supplement: Ensure Plus High Protein Vanilla    Frequency Dinner        05/03/24 1133                        Hospital Course:     Lauren Quintero is a 78 y.o. female patient who originally presented to the hospital on 5/2/2024 with underlying history of hypertension/CKD/depression/anemia/COPD-chronic hypoxic respiratory failure/CAD s/p CABG with LifeVest/CHF presenting in septic shock.  Symptoms of lethargy/fevers for several days.  Noted to be hypotensive and admitted for pressors.    -Septic shock now resolved.  Likely due to pyelonephritis/pneumonia seen on CT imaging.  Patient s/p fluid resuscitation/IV antibiotics.  2 out of 2 blood cultures growing pansensitive E. coli, repeat blood cultures negative.  MRSA negative.  Strep  "pneumococcal/Legionella antigen negative.  Initially receiving cefepime/vancomycin and subsequently IV ceftriaxone.  Now hemodynamically stable.    -Acute on chronic hypoxic respiratory failure with recent history of COVID-19 in 224.  CT chest noting persistent multifocal consolidations likely sequelae of prior pneumonia.  Did complete 7-day IV antibiotics.    - Acute on chronic kidney failure, on admission creatinine of 2.99 likely multifactorial secondary to poor oral intake as well as ATN from septic shock.    - Anemia, at baseline.  EGD in 2023 noted mild erosive gastritis with duodenal bulb angiectasia s/p APC.  Colonoscopy in 2023 which noted left-sided diverticulosis with old blood clots likely indicating a diverticular bleed.  Had normal outpatient video capsule endoscopy.  Hemoglobin 8.7 today at discharge.    - Patient going to SNF today.  She reports feeling at baseline.  On 4 L O2 which is her baseline.  Verbally understands plan above and is in agreement.    Condition at Discharge: fair     Discharge Day Creatinine trended down to 1.5 today which could be her new baseline.  Encouraged oral fluids.Visit / Exam:       Vitals: Blood Pressure: 112/60 (05/15/24 1053)  Pulse: 95 (05/15/24 1053)  Temperature: 97.6 °F (36.4 °C) (05/15/24 1053)  Temp Source: Axillary (05/13/24 2001)  Respirations: 18 (05/15/24 0827)  Height: 5' 7\" (170.2 cm) (05/03/24 0100)  Weight - Scale: 50.6 kg (111 lb 8.8 oz) (05/15/24 0600)  SpO2: 96 % (05/15/24 1053)  Exam:   Physical Exam  Constitutional:       General: She is not in acute distress.     Appearance: She is not toxic-appearing.   HENT:      Mouth/Throat:      Mouth: Mucous membranes are moist.      Pharynx: Oropharynx is clear.   Cardiovascular:      Rate and Rhythm: Normal rate and regular rhythm.      Pulses: Normal pulses.   Pulmonary:      Effort: Pulmonary effort is normal. No respiratory distress.      Breath sounds: Normal breath sounds.   Abdominal:      General: " Abdomen is flat. Bowel sounds are normal.      Palpations: Abdomen is soft.   Musculoskeletal:      Right lower leg: No edema.      Left lower leg: No edema.   Neurological:      General: No focal deficit present.      Mental Status: She is alert and oriented to person, place, and time.           Goals of Care Discussions:  Code Status at Discharge: Level 1 - Full Code      Discharge instructions/Information to patient and family:   See after visit summary section titled Discharge Instructions for information provided to patient and family.       Discharge Statement:  I spent 40 minutes discharging the patient. This time was spent on the day of discharge. I had direct contact with the patient on the day of discharge. Greater than 50% of the total time was spent examining patient, answering all patient questions, arranging and discussing plan of care with patient as well as directly providing post-discharge instructions.  Additional time then spent on discharge activities.    ** Please Note: This note has been constructed using a voice recognition system **

## 2024-05-15 NOTE — CASE MANAGEMENT
Case Management Discharge Planning Note    Patient name Lauren Quintero  Location /-01 MRN [id number]  : 1945 Date 5/15/2024       Current Admission Date: 2024  Current Admission Diagnosis:Septic shock ([facility])   Patient Active Problem List    Diagnosis Date Noted Date Diagnosed    Acute kidney injury superimposed on chronic kidney disease  ([facility]) 2024     Septic shock ([facility]) 2024     DARLING (acute kidney injury) ([facility]) 2024     Diarrhea 2024     Pain 04/15/2024     Loose stools 2024     Insomnia 2024     Non-ST elevation MI (NSTEMI) ([facility]) 2024     Abnormal chest x-ray 2024     Chronic combined systolic and diastolic CHF (congestive heart failure) ([facility]) 2024     Elevated troponin 2024     CKD (chronic kidney disease) 2024     Dizziness 2023     Social problem 2023     Diverticulitis 10/29/2023     Moderate protein-calorie malnutrition ([facility]) 2023     BRBPR (bright red blood per rectum) 2023     Generalized weakness 2023     Staring episodes 2023     Waldenstrom macroglobulinemia ([facility]) 2023     Severe protein-calorie malnutrition ([facility]) 2021     Positive blood culture 2021     COPD (chronic obstructive pulmonary disease) ([facility]) 2021     Anemia 2021     Abnormal computed tomography angiography (CTA) 2021     Fatigue 2021     Major depressive disorder, recurrent episode, moderate ([facility]) 2019     Flank pain 2019     Coronary artery disease of native artery of native heart with stable angina pectoris ([facility]) 2018     Acute on chronic respiratory failure with hypoxia ([facility]) 2018     Ambulatory dysfunction 2018     Essential hypertension 2018     Tobacco abuse 2018       LOS (days): 13  Geometric Mean LOS (GMLOS) (days): 5.1  Days to GMLOS:-7.4     OBJECTIVE:  Risk of Unplanned Readmission Score: 42.83         Current admission  status: Inpatient   Preferred Pharmacy:   [id number] [place] - [address]  [address]  [place] [address]  Phone: [phone] Fax: [phone]    [facility] [place] - [address]  [address]  [address]  [place] [address]  Phone: [phone] Fax: [phone]    [place], [place] - [address]  [address] [place] [address]  Phone: [phone] Fax: [phone]    [facility] - [place], [place] - [address]  [address] [facility] [place] [address]  Phone: [phone] Fax: [phone]    Primary Care Provider: Starla Bateman MD    Primary Insurance: MEDICARE  Secondary Insurance:     DISCHARGE DETAILS:       Accepting Facility Name, City & State : [facility]  Receiving Facility/Agency Phone Number: Phone: [phone]  Facility/Agency Fax Number: Fax: [phone]

## 2024-05-15 NOTE — NURSING NOTE
Pt left unit with all belongings including meds and life vest. Discharge papers with EMS crew and report called to facility by charge nurse

## 2024-05-15 NOTE — PLAN OF CARE
"Report given to EMS crew, Belongings with patient , Patient declined to wear life vest , \"I dont want to wear that thing\".   "

## 2024-05-15 NOTE — ASSESSMENT & PLAN NOTE
Noted history; + smoker  Follows with Pulmonology OP  Chonically on 4L nc at home.  Takes Advair at home.Continue nebs.

## 2024-05-15 NOTE — ASSESSMENT & PLAN NOTE
History of combined CHF with the most recent EF of 35%  Pt took off Life vest : reports she will wear at home.  Continue telemetry monitor in hospital , on dc should have life vest in place  GDMT includes Toprol XL, Entresto

## 2024-05-15 NOTE — ASSESSMENT & PLAN NOTE
Chronically wears 4 L NC at baseline  Recent history of COVID-19 infection in  02/2024  CT chest Persistent multifocal consolidations may represent sequela of prior pneumonia. However, recurrent pneumonia or chronic infectious or inflammatory process is not excluded   S/p 7 days of iv antibiotics

## 2024-05-15 NOTE — ASSESSMENT & PLAN NOTE
Noted history  EGD in 2023 Mild erosive gastritis and Duodenal bulb angiectasia status post APC  Colonoscopy in  2023 Left-sided diverticulosis with old blood clots likely indicating a diverticular bleed   Had Normal out pt video capsule endoscopy.   Patient hemoglobin trended down to 8.7, monitor. No active bleeding noted

## 2024-05-15 NOTE — ASSESSMENT & PLAN NOTE
Present on admission, evidenced by a creatinine of 2.99 with a baseline of 0.7-1.1  Etiology likely multifactorial, secondary to poor oral intake as well as ATN from septic shock/hypotension.  Trended down to 1.5 today could be a new baseline renal functions  Encourage p.o. fluids

## 2024-05-16 ENCOUNTER — NURSING HOME VISIT (OUTPATIENT)
Dept: GERIATRICS | Facility: OTHER | Age: 79
End: 2024-05-16
Payer: MEDICARE

## 2024-05-16 VITALS
RESPIRATION RATE: 18 BRPM | TEMPERATURE: 97.5 F | BODY MASS INDEX: 19.98 KG/M2 | SYSTOLIC BLOOD PRESSURE: 118 MMHG | OXYGEN SATURATION: 92 % | HEART RATE: 80 BPM | DIASTOLIC BLOOD PRESSURE: 74 MMHG | WEIGHT: 127.6 LBS

## 2024-05-16 DIAGNOSIS — F33.1 MAJOR DEPRESSIVE DISORDER, RECURRENT EPISODE, MODERATE (HCC): ICD-10-CM

## 2024-05-16 DIAGNOSIS — N18.9 ACUTE KIDNEY INJURY SUPERIMPOSED ON CHRONIC KIDNEY DISEASE  (HCC): ICD-10-CM

## 2024-05-16 DIAGNOSIS — D64.9 ANEMIA, UNSPECIFIED TYPE: ICD-10-CM

## 2024-05-16 DIAGNOSIS — I50.42 CHRONIC COMBINED SYSTOLIC AND DIASTOLIC CHF (CONGESTIVE HEART FAILURE) (HCC): ICD-10-CM

## 2024-05-16 DIAGNOSIS — E43 SEVERE PROTEIN-CALORIE MALNUTRITION (HCC): ICD-10-CM

## 2024-05-16 DIAGNOSIS — R26.2 AMBULATORY DYSFUNCTION: ICD-10-CM

## 2024-05-16 DIAGNOSIS — N17.9 ACUTE KIDNEY INJURY SUPERIMPOSED ON CHRONIC KIDNEY DISEASE  (HCC): ICD-10-CM

## 2024-05-16 DIAGNOSIS — J96.21 ACUTE ON CHRONIC RESPIRATORY FAILURE WITH HYPOXIA (HCC): ICD-10-CM

## 2024-05-16 DIAGNOSIS — R65.21 SEPTIC SHOCK (HCC): Primary | ICD-10-CM

## 2024-05-16 DIAGNOSIS — I10 ESSENTIAL HYPERTENSION: ICD-10-CM

## 2024-05-16 DIAGNOSIS — R53.1 GENERALIZED WEAKNESS: ICD-10-CM

## 2024-05-16 DIAGNOSIS — C88.0 WALDENSTROM MACROGLOBULINEMIA (HCC): ICD-10-CM

## 2024-05-16 DIAGNOSIS — A41.9 SEPTIC SHOCK (HCC): Primary | ICD-10-CM

## 2024-05-16 PROCEDURE — 99306 1ST NF CARE HIGH MDM 50: CPT | Performed by: INTERNAL MEDICINE

## 2024-05-16 NOTE — ASSESSMENT & PLAN NOTE
History of anemia.  Recent hemoglobin was 8.7.  Patient has had prior GI workup in 2023 with EGD revealing mild erosive gastritis and did not angiectasia status post APC, colonoscopy revealed left-sided diverticulosis with old blood clots likely indicating diverticular bleed.  Capsule endoscopy done on outpatient basis was reported negative.  Continue to monitorPatient is a 78 y.o. female with past medical history significant for COPD on chronic oxygen therapy, depression, hyperlipidemia, MI, CAD, benign kidney tumor,Anxiety, CAD and CHF.

## 2024-05-16 NOTE — ASSESSMENT & PLAN NOTE
Blood pressure stable on metoprolol succinate 100 mg daily, Entresto 1 tablet twice daily and amlodipine 10 mg daily.  Avoid hypotension

## 2024-05-16 NOTE — ASSESSMENT & PLAN NOTE
Patient was recently hospitalized with septic shock related to pneumonia/pyelonephritis as seen on CT imaging.  Blood cultures revealed growth of E. coli.  She was initially started on cefepime and vancomycin subsequently switched over to IV vancomycin.  Patient completed antibiotic course prior to discharge.  Urine strep antigen and antigens were negative.  Remains afebrile and clinically nontoxic.  Will continue to monitor.

## 2024-05-16 NOTE — ASSESSMENT & PLAN NOTE
History of Waldenström's macroglobulinemia.  Follow-up with heme-onc.  Patient remains on zanubrutinib

## 2024-05-16 NOTE — ASSESSMENT & PLAN NOTE
History of chronic respiratory failure, patient wears oxygen 4 L via nasal cannula at baseline.  She was hospitalized in February/24 with COVID-19 infection.  CT chest revealed persistent multifocal consolidations.  Recent CT imaging revealed bilateral opacities atelectasis versus pneumonia.  Patient completed 7-day course of antibiotic at the hospital repeat CT was recommended in 3 months.

## 2024-05-16 NOTE — PROGRESS NOTES
[facility]  [address]  [place] [address]  [id number]    Nursing Home Admission    NAME: Lauren Quintero  AGE: 78 y.o. SEX: female [id number]      Patient Location     [facility]    Patient’s care was coordinated with nursing facility staff. Recent vitals, labs and updated medications were reviewed on [employer] system of facility. Past Medical, surgical, social, medication and allergy history and patient’s previous records reviewed.       Assessment/Plan:    Septic shock (HCC)  Patient was recently hospitalized with septic shock related to pneumonia/pyelonephritis as seen on CT imaging.  Blood cultures revealed growth of E. coli.  She was initially started on cefepime and vancomycin subsequently switched over to IV vancomycin.  Patient completed antibiotic course prior to discharge.  Urine strep antigen and antigens were negative.  Remains afebrile and clinically nontoxic.  Will continue to monitor.    Generalized weakness  Continue PT OT    Ambulatory dysfunction  Continue PT OT    Acute kidney injury superimposed on chronic kidney disease  ([facility])  Lab Results   Component Value Date    EGFR 32 05/15/2024    EGFR 33 05/14/2024    EGFR 28 05/13/2024    CREATININE 1.54 (H) 05/15/2024    CREATININE 1.50 (H) 05/14/2024    CREATININE 1.73 (H) 05/13/2024   DARLING noted upon recent admission with creatinine of 2.9 attributed to prerenal state as well as ATN from septic shock/hypotension.  Renal function improved with hydration.  Creatinine came down to 1.5 which was felt to be a new baseline for the patient.  Follow-up repeat BMP.  Avoid hypotension and nephrotoxins    Acute on chronic respiratory failure with hypoxia ([facility])  History of chronic respiratory failure, patient wears oxygen 4 L via nasal cannula at baseline.  She was hospitalized in February/24 with COVID-19 infection.  CT chest revealed persistent multifocal consolidations.  Recent CT imaging revealed  bilateral opacities atelectasis versus pneumonia.  Patient completed 7-day course of antibiotic at the hospital repeat CT was recommended in 3 months.    Chronic combined systolic and diastolic CHF (congestive heart failure) (HCC)  Wt Readings from Last 3 Encounters:   05/16/24 57.9 kg (127 lb 9.6 oz)   05/15/24 50.6 kg (111 lb 8.8 oz)   04/02/24 54.4 kg (120 lb)       History of combined CHF with most recent EF of 35%.  She was discharged on a LifeVest. Staff reports patient initially refused to wear it later agreed an currently has it on.        Essential hypertension  Blood pressure stable on metoprolol succinate 100 mg daily, Entresto 1 tablet twice daily and amlodipine 10 mg daily.  Avoid hypotension    Major depressive disorder, recurrent episode, moderate (HCC)  History of depression.  Patient remains on multiple meds including Vrylar 1.5 mg daily, bupropion 75 mg daily and Vilazdone 40 mg daily    Anemia  History of anemia.  Recent hemoglobin was 8.7.  Patient has had prior GI workup in 2023 with EGD revealing mild erosive gastritis and did not angiectasia status post APC, colonoscopy revealed left-sided diverticulosis with old blood clots likely indicating diverticular bleed.  Capsule endoscopy done on outpatient basis was reported negative.  Continue to monitorPatient is a 78 y.o. female with past medical history significant for COPD on chronic oxygen therapy, depression, hyperlipidemia, MI, CAD, benign kidney tumor,Anxiety, CAD and CHF.       Severe protein-calorie malnutrition (HCC)  Malnutrition Findings:                Patient remains ill with generalized body muscle wasting.  Encourage p.o. intake.  Follow-up with dietitian                 BMI Findings:           Body mass index is 19.98 kg/m².       Waldenstrom macroglobulinemia (HCC)  History of Waldenström's macroglobulinemia.  Follow-up with heme-onc.  Patient remains on zanubrutinib        Chief Complaint     Recent hospitalization for septic  shock, weakness    HPI       Patient is a 78 y.o. female with past medical history significant for COPD on chronic oxygen therapy, depression, hyperlipidemia, MI, CAD, benign kidney tumor,Anxiety, CAD and CHF.       Patient has had several hospitalizations since earlier this year.  She was hospitalized in February with COVID infection.  CT chest revealed tree-in-bud opacities representing infection with some mild pulmonary edema.  Patient was also noted to have positive troponins with echo revealing reduced EF of 35%.  She was diagnosed with non-ST elevation MI, subsequently underwent cardiac cath revealing triple-vessel disease outpatient follow-up for CABG was recommended.  She was discharged to SNF with LifeVest given reduced EF.  Patient was discharged from rehab approximately 3 weeks ago.  She was rehospitalized on 5/2/2024 with increased lethargy and fever. CT imaging revealed pyelonephritis/pneumonia.  Patient was diagnosed with septic shock related to pyelonephritis/pneumonia.  Blood cultures revealed pansensitive E. coli.  Urine strep pneumo and Legionella antigens were negative.  Patient was initially started on cefepime and vancomycin and subsequently switched over to IV ceftriaxone.  Antibiotic course was completed in the hospital.  Hospital stay was also significant for DARLING with creatinine of 2.9.  Baseline creatinine is between 0.7-1.1.  Renal function improved with hydration.  Creatinine came down to 1.5 which was felt to be a new baseline for pt.  Patient was seen by PT OT services and subsequently discharged to [facility] rehab where she is being seen for posthospital admission.  At the time of my evaluation patient is doing okay.  Remains weak.    Past Medical History:   Diagnosis Date    Acute congestive heart failure (HCC) 08/27/2021    Anxiety     Cardiac disease     Chest pain 08/27/2021    Chronic pain disorder     COPD (chronic obstructive pulmonary disease) (HCC)     COVID-19 02/15/2024     Depression     Heart disease     Hyperlipidemia     Hypertension     MI (myocardial infarction) (HCC)     MRSA (methicillin resistant Staphylococcus aureus)     Renal disorder     benign kidney tumor       Past Surgical History:   Procedure Laterality Date    APPENDECTOMY      CARDIAC CATHETERIZATION N/A 2024    Procedure: Cardiac catheterization;  Surgeon: Luke Malagon MD;  Location: [facility];  Service: Cardiology    CARDIAC CATHETERIZATION N/A 2024    Procedure: Cardiac Coronary Angiogram;  Surgeon: Luke Malagon MD;  Location: [facility];  Service: Cardiology    CARDIAC CATHETERIZATION Left 2024    Procedure: Cardiac Left Heart Cath;  Surgeon: Luke Malagon MD;  Location: [facility];  Service: Cardiology    ELBOW BURSA SURGERY Left 2018    D/T MRSA INFECTION    IR THORACENTESIS  2024    SPINAL FUSION      L7 L9       Social History     Tobacco Use   Smoking Status Former    Current packs/day: 0.00    Average packs/day: 1 pack/day for 60.0 years (60.0 ttl pk-yrs)    Types: Cigarettes    Start date:     Quit date:     Years since quittin.3   Smokeless Tobacco Never   Tobacco Comments    She has close to a 60 pack-year smoking history, most recently has cut down to 4-5 cigarettes daily          Family History   Problem Relation Age of Onset    Heart disease Mother     Cancer Father         Allergies   Allergen Reactions    Sulfa Antibiotics Anaphylaxis    Iron GI Intolerance    Niacin     Statins Other (See Comments)     cramps          Current Outpatient Medications:     albuterol (Ventolin HFA) 90 mcg/act inhaler, Inhale 2 puffs every 6 (six) hours as needed for wheezing Do not start before 2024., Disp: 18 g, Rfl: 0    amLODIPine (NORVASC) 10 mg tablet, Take 1 tablet (10 mg total) by mouth daily, Disp: , Rfl:     aspirin (ECOTRIN LOW STRENGTH) 81 mg EC tablet, Take 1 tablet (81 mg total) by mouth daily for 15 days Do not  start before April 29, 2024., Disp: 15 tablet, Rfl: 0    budesonide-formoterol (SYMBICORT) 80-4.5 MCG/ACT inhaler, Inhale 2 puffs 2 (two) times a day Rinse mouth after use., Disp: 10.2 g, Rfl: 0    buPROPion (WELLBUTRIN) 75 mg tablet, Take 1 tablet (75 mg total) by mouth in the morning for 15 days Do not start before April 29, 2024., Disp: 15 tablet, Rfl: 0    Calcium 500 MG tablet, Take 1 tablet (500 mg total) by mouth 2 (two) times a day for 15 days Do not start before April 29, 2024., Disp: 30 tablet, Rfl: 0    cariprazine (Vraylar) 1.5 MG capsule, Take 1 capsule (1.5 mg total) by mouth daily for 15 days Do not start before April 29, 2024., Disp: 15 capsule, Rfl: 0    Fluticasone-Salmeterol (Advair Diskus) 250-50 mcg/dose inhaler, Inhale 1 puff 2 (two) times a day Rinse mouth after use. Do not start before April 29, 2024., Disp: 60 blister, Rfl: 0    gabapentin (NEURONTIN) 100 mg capsule, Take 1 capsule (100 mg total) by mouth daily at bedtime for 15 days Do not start before April 29, 2024., Disp: 15 capsule, Rfl: 0    ipratropium (ATROVENT) 0.02 % nebulizer solution, Take 2.5 mL (0.5 mg total) by nebulization 3 (three) times a day for 15 days Do not start before April 29, 2024., Disp: 112.5 mL, Rfl: 0    metoprolol succinate (TOPROL-XL) 100 mg 24 hr tablet, Take 1 tablet (100 mg total) by mouth daily, Disp: , Rfl:     nitroglycerin (NITROSTAT) 0.4 mg SL tablet, Place 1 tablet (0.4 mg total) under the tongue every 5 (five) minutes as needed for chest pain for up to 15 days Do not start before April 29, 2024., Disp: 15 tablet, Rfl: 0    pantoprazole (PROTONIX) 40 mg tablet, Take 1 tablet (40 mg total) by mouth daily for 15 doses Do not start before April 29, 2024., Disp: 15 tablet, Rfl: 0    pravastatin (PRAVACHOL) 80 mg tablet, Take 1 tablet (80 mg total) by mouth every evening for 15 days Do not start before April 29, 2024., Disp: 15 tablet, Rfl: 0    sacubitril-valsartan (Entresto) 24-26 MG TABS, Take 1 tablet  by mouth 2 (two) times a day for 30 doses Do not start before April 29, 2024., Disp: 30 tablet, Rfl: 0    vilazodone (VIIBRYD) 40 mg tablet, Take 1 tablet (40 mg total) by mouth daily with breakfast for 15 days Do not start before April 29, 2024., Disp: 15 tablet, Rfl: 0    Zanubrutinib 80 MG CAPS, Take 160 mg by mouth 2 (two) times a day, Disp: , Rfl:   No current facility-administered medications for this visit.    Updated list was reviewed in [employer] system of facility.     Vitals:    05/16/24 1011   BP: 118/74   Pulse: 80   Resp: 18   Temp: 97.5 °F (36.4 °C)   SpO2: 92%       Vital signs were reviewed in point [facility]    Review of Systems   Constitutional:  Positive for fatigue. Negative for chills and fever.   HENT:  Negative for nosebleeds and rhinorrhea.    Eyes:  Negative for discharge and redness.   Respiratory:  Positive for cough and shortness of breath. Negative for chest tightness, wheezing and stridor.    Cardiovascular:  Negative for chest pain and leg swelling.   Gastrointestinal:  Negative for abdominal distention, abdominal pain, diarrhea and vomiting.   Genitourinary:  Negative for dysuria, flank pain and hematuria.   Musculoskeletal:  Positive for back pain (at times) and gait problem. Negative for arthralgias.   Skin:  Negative for pallor.   Neurological:  Positive for weakness (Generalized). Negative for tremors, seizures, syncope and headaches.   Psychiatric/Behavioral:  Negative for agitation, behavioral problems and confusion.        Physical Exam  Constitutional:       General: She is not in acute distress.  HENT:      Head: Normocephalic and atraumatic.      Nose: No rhinorrhea.   Eyes:      General: No scleral icterus.        Right eye: No discharge.         Left eye: No discharge.   Cardiovascular:      Rate and Rhythm: Normal rate and regular rhythm.      Heart sounds: Murmur heard.      Comments: Life vest in place  Pulmonary:      Breath sounds: Rales (Few at bases)  "present. No wheezing or rhonchi.      Comments: Pt on Oxygen  Abdominal:      General: There is no distension.      Palpations: Abdomen is soft.      Tenderness: There is no abdominal tenderness. There is no guarding.   Musculoskeletal:      Cervical back: Neck supple.      Right lower leg: No edema.      Left lower leg: No edema.   Skin:     Coloration: Skin is not jaundiced.      Comments: Ecchymosis involving B/L UE   Neurological:      General: No focal deficit present.      Mental Status: Mental status is at baseline.      Cranial Nerves: No cranial nerve deficit.      Comments: Oriented to month and year   Psychiatric:         Mood and Affect: Mood normal.         Behavior: Behavior normal.           Diagnostic Data       Recent labs and imaging studies were reviewed.  Lab Results   Component Value Date    WBC 9.60 05/15/2024    HGB 8.7 (L) 05/15/2024    HCT 28.3 (L) 05/15/2024    MCV 96 05/15/2024     05/15/2024         Lab Results   Component Value Date    SODIUM 140 05/15/2024    K 4.3 05/15/2024     05/15/2024    CO2 30 05/15/2024    BUN 32 (H) 05/15/2024    CREATININE 1.54 (H) 05/15/2024    GLUC 88 05/15/2024    CALCIUM 9.1 05/15/2024      Code Status:      Full code        Portions of the record may have been created with voice recognition software.  Occasional wrong word or \"sound a like\" substitutions may have occurred due to the inherent limitations of voice recognition software.  Read the chart carefully and recognize, using context, where substitutions have occurred.    This note was electronically signed by Dr. Annamarie Muhammad   "

## 2024-05-16 NOTE — ASSESSMENT & PLAN NOTE
Malnutrition Findings:                Patient remains ill with generalized body muscle wasting.  Encourage p.o. intake.  Follow-up with dietitian                 BMI Findings:           Body mass index is 19.98 kg/m².

## 2024-05-16 NOTE — ASSESSMENT & PLAN NOTE
History of depression.  Patient remains on multiple meds including Vrylar 1.5 mg daily, bupropion 75 mg daily and Vilazdone 40 mg daily

## 2024-05-16 NOTE — ASSESSMENT & PLAN NOTE
Wt Readings from Last 3 Encounters:   05/16/24 57.9 kg (127 lb 9.6 oz)   05/15/24 50.6 kg (111 lb 8.8 oz)   04/02/24 54.4 kg (120 lb)       History of combined CHF with most recent EF of 35%.  She was discharged on a LifeVest. Staff reports patient initially refused to wear it later agreed an currently has it on.

## 2024-05-16 NOTE — ASSESSMENT & PLAN NOTE
Lab Results   Component Value Date    EGFR 32 05/15/2024    EGFR 33 05/14/2024    EGFR 28 05/13/2024    CREATININE 1.54 (H) 05/15/2024    CREATININE 1.50 (H) 05/14/2024    CREATININE 1.73 (H) 05/13/2024   DARLING noted upon recent admission with creatinine of 2.9 attributed to prerenal state as well as ATN from septic shock/hypotension.  Renal function improved with hydration.  Creatinine came down to 1.5 which was felt to be a new baseline for the patient.  Follow-up repeat BMP.  Avoid hypotension and nephrotoxins

## 2024-05-17 ENCOUNTER — PATIENT OUTREACH (OUTPATIENT)
Dept: CASE MANAGEMENT | Facility: OTHER | Age: 79
End: 2024-05-17

## 2024-05-17 NOTE — PROGRESS NOTES
[facility] and care is coordinated between nursing home staff and  [facility].  She has been in the hospital or Rehab since the initial referral on 3/1. RT outreach was not completed.  Episode will end at this time and RT removed from Care team.   Outreach will resume If needed in the future, with a new referral.

## 2024-05-20 ENCOUNTER — NURSING HOME VISIT (OUTPATIENT)
Dept: PULMONOLOGY | Facility: CLINIC | Age: 79
End: 2024-05-20
Payer: MEDICARE

## 2024-05-20 DIAGNOSIS — J44.9 CHRONIC OBSTRUCTIVE PULMONARY DISEASE, UNSPECIFIED COPD TYPE (HCC): Primary | ICD-10-CM

## 2024-05-20 DIAGNOSIS — I25.118 CORONARY ARTERY DISEASE OF NATIVE ARTERY OF NATIVE HEART WITH STABLE ANGINA PECTORIS (HCC): ICD-10-CM

## 2024-05-20 PROCEDURE — 99305 1ST NF CARE MODERATE MDM 35: CPT | Performed by: INTERNAL MEDICINE

## 2024-05-20 PROCEDURE — G2211 COMPLEX E/M VISIT ADD ON: HCPCS | Performed by: INTERNAL MEDICINE

## 2024-05-20 NOTE — ASSESSMENT & PLAN NOTE
Continue medical goal-directed therapy per cardiology.  She needs a repeat echocardiogram and discontinuation of LifeVest if her EF is improved.

## 2024-05-20 NOTE — PROGRESS NOTES
Ambulatory Visit  Name: Lauren Quintero      : 1945      MRN: [id number]  Encounter Provider: Shonda Mccrary DO  Encounter Date: 2024   Encounter department: [facility] PULMONARY [facility]    Assessment & Plan   1. Chronic obstructive pulmonary disease, unspecified COPD type (HCC)  Assessment & Plan:  Lauren is at her respiratory baseline.  She does not have pulmonary function test but presumed COPD will maintain on Anoro not Advair.  Will discontinue Advair and place patient on Anoro upon discharge as well.  Continue to use DuoNebs 3 times daily and patient's on her chronic 4 L of oxygen.  2. Coronary artery disease of native artery of native heart with stable angina pectoris (HCC)  Assessment & Plan:  Continue medical goal-directed therapy per cardiology.  She needs a repeat echocardiogram and discontinuation of LifeVest if her EF is improved.      History of Present Illness     Lauren Quintero is a 78 y.o. female who presents after hospitalization for sepsis.  She denies any shortness of breath or cough.    Review of Systems   Constitutional: Negative.  Negative for unexpected weight change.   HENT: Negative.  Negative for postnasal drip.    Eyes: Negative.    Respiratory: Negative.  Negative for cough, shortness of breath and wheezing.    Cardiovascular: Negative.  Negative for chest pain and leg swelling.   Gastrointestinal: Negative.    Endocrine: Negative.    Genitourinary: Negative.    Musculoskeletal: Negative.    Allergic/Immunologic: Negative.    Neurological: Negative.    Hematological: Negative.      Medical History Reviewed by provider this encounter:       Objective     There were no vitals taken for this visit.    Physical Exam  Constitutional:       Appearance: She is well-developed.   HENT:      Head: Normocephalic and atraumatic.   Eyes:      Pupils: Pupils are equal, round, and reactive to light.   Cardiovascular:      Rate and Rhythm: Normal rate and regular rhythm.      Heart  sounds: No murmur heard.  Pulmonary:      Effort: Pulmonary effort is normal. No respiratory distress.      Breath sounds: Normal breath sounds. No wheezing or rales.   Abdominal:      Palpations: Abdomen is soft.   Musculoskeletal:      Cervical back: Normal range of motion and neck supple.   Skin:     General: Skin is warm and dry.   Neurological:      Mental Status: She is alert and oriented to person, place, and time.       Administrative Statements

## 2024-05-20 NOTE — ASSESSMENT & PLAN NOTE
Lauren is at her respiratory baseline.  She does not have pulmonary function test but presumed COPD will maintain on Anoro not Advair.  Will discontinue Advair and place patient on Anoro upon discharge as well.  Continue to use DuoNebs 3 times daily and patient's on her chronic 4 L of oxygen.

## 2024-05-22 ENCOUNTER — OFFICE VISIT (OUTPATIENT)
Dept: CARDIOLOGY CLINIC | Facility: SKILLED NURSING FACILITY | Age: 79
End: 2024-05-22
Payer: MEDICARE

## 2024-05-22 ENCOUNTER — HOSPITAL ENCOUNTER (OUTPATIENT)
Dept: NON INVASIVE DIAGNOSTICS | Facility: CLINIC | Age: 79
Discharge: HOME | End: 2024-05-22
Payer: MEDICARE

## 2024-05-22 VITALS
HEART RATE: 100 BPM | HEIGHT: 67 IN | DIASTOLIC BLOOD PRESSURE: 74 MMHG | WEIGHT: 127 LBS | SYSTOLIC BLOOD PRESSURE: 118 MMHG | BODY MASS INDEX: 19.93 KG/M2

## 2024-05-22 DIAGNOSIS — I25.118 CORONARY ARTERY DISEASE OF NATIVE ARTERY OF NATIVE HEART WITH STABLE ANGINA PECTORIS (HCC): ICD-10-CM

## 2024-05-22 DIAGNOSIS — I10 ESSENTIAL HYPERTENSION: ICD-10-CM

## 2024-05-22 DIAGNOSIS — I50.42 CHRONIC COMBINED SYSTOLIC AND DIASTOLIC CHF (CONGESTIVE HEART FAILURE) (HCC): Primary | ICD-10-CM

## 2024-05-22 DIAGNOSIS — I50.42 CHRONIC COMBINED SYSTOLIC AND DIASTOLIC CHF (CONGESTIVE HEART FAILURE) (HCC): ICD-10-CM

## 2024-05-22 LAB
AORTIC VALVE MEAN VELOCITY: 8.5 M/S
APICAL FOUR CHAMBER EJECTION FRACTION: 49 %
AV AREA BY CONTINUOUS VTI: 2.8 CM2
AV AREA PEAK VELOCITY: 2.7 CM2
AV LVOT MEAN GRADIENT: 3 MMHG
AV LVOT PEAK GRADIENT: 5 MMHG
AV MEAN GRADIENT: 3 MMHG
AV PEAK GRADIENT: 6 MMHG
AV VALVE AREA: 2.82 CM2
AV VELOCITY RATIO: 0.94
BSA FOR ECHO PROCEDURE: 1.67 M2
DOP CALC AO PEAK VEL: 1.21 M/S
DOP CALC AO VTI: 23.51 CM
DOP CALC LVOT AREA: 2.83 CM2
DOP CALC LVOT CARDIAC INDEX: 4.12 L/MIN/M2
DOP CALC LVOT CARDIAC OUTPUT: 6.88 L/MIN
DOP CALC LVOT DIAMETER: 1.9 CM
DOP CALC LVOT PEAK VEL VTI: 23.43 CM
DOP CALC LVOT PEAK VEL: 1.14 M/S
DOP CALC LVOT STROKE INDEX: 41.3 ML/M2
DOP CALC LVOT STROKE VOLUME: 66.4
LEFT VENTRICLE DIASTOLIC VOLUME (MOD BIPLANE): 74 ML
LEFT VENTRICLE DIASTOLIC VOLUME INDEX (MOD BIPLANE): 44.3 ML/M2
LEFT VENTRICLE SYSTOLIC VOLUME (MOD BIPLANE): 34 ML
LEFT VENTRICLE SYSTOLIC VOLUME INDEX (MOD BIPLANE): 20.4 ML/M2
LV EF: 54 %
RA PRESSURE ESTIMATED: 5 MMHG
RIGHT VENTRICLE ID DIMENSION: 3.9 CM
SL CV LV EF: 50
TRICUSPID VALVE PEAK REGURGITATION VELOCITY: 2.93 M/S

## 2024-05-22 PROCEDURE — 93308 TTE F-UP OR LMTD: CPT

## 2024-05-22 PROCEDURE — 93321 DOPPLER ECHO F-UP/LMTD STD: CPT

## 2024-05-22 PROCEDURE — 93321 DOPPLER ECHO F-UP/LMTD STD: CPT | Performed by: INTERNAL MEDICINE

## 2024-05-22 PROCEDURE — 99305 1ST NF CARE MODERATE MDM 35: CPT | Performed by: INTERNAL MEDICINE

## 2024-05-22 PROCEDURE — 93308 TTE F-UP OR LMTD: CPT | Performed by: INTERNAL MEDICINE

## 2024-05-22 PROCEDURE — 93325 DOPPLER ECHO COLOR FLOW MAPG: CPT

## 2024-05-22 PROCEDURE — 93325 DOPPLER ECHO COLOR FLOW MAPG: CPT | Performed by: INTERNAL MEDICINE

## 2024-05-22 NOTE — PROGRESS NOTES
[facility] Consultation - Cardiology     Lauren Quintero 78 y.o. female MRN: [id number]        Assessment:  1. Chronic combined systolic and diastolic CHF (congestive heart failure) (HCC)        2. Coronary artery disease of native artery of native heart with stable angina pectoris (HCC)        3. Essential hypertension          Combined CHF:  I reviewed her echo. Her ejection fraction appears improved. Official report is pending, but it looks to be 45% to my eye. She can remove the LifeVest. Recommend continuing Entresto and Toprol at the current doses. She is not currently requiring any loop diuretic. The LifeVest can be returned to the company.    CAD with multivessel coronary artery disease:  Continue aspirin, lipid-lowering therapy. Good blood pressure control. She is currently without any symptoms of significant angina. She has nitroglycerin available to her if needed. She has decided that she would not want to have any bypass surgery and she wants to continue with medical management. This seems appropriate and she does seem pretty well compensated currently.    Hypertension:  Blood pressure is controlled with the regimen that she uses for her heart failure which includes Norvasc, Entresto, Toprol-XL.        She may become a long-term resident of the facility. Regardless, she can have follow-up in about 3 months either at the facility or as an outpatient.        History of Present Illness   Reason for Consult / Principal Problem: CHF:  HPI: Lauren Quintero is a 78 y.o. year old female who I have previously evaluated when she has been at the nursing facility.    I had seen her back in March 2024. 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 She was wearing a LifeVest and continues to do so up until today.    She has declined surgical  intervention for the coronary disease and she does not wish to follow-up with the CT surgeons.    I had recommended a follow-up echocardiogram be done and she had this done this morning.    She was actually discharged from the nursing facility, but then was readmitted to the hospital with sepsis. She was admitted again to the nursing facility and it is unclear whether she will be a long-term resident, now.    She denies any acute cardiac complaints. Her volume status has been stable. She denies any edema. She is wearing oxygen. I saw her in the therapy room, and she was sitting in the wheelchair. She had an echo done earlier today which I had reviewed personally. The formal report is not in, but ejection fraction of appears improved to me to about 45%.      Review of Systems:  Shortness of breath, fatigue, difficulty with ambulation, weakness  Otherwise, 12 point review systems negative.    Historical Information   Past Medical History:   Diagnosis Date    Acute congestive heart failure (HCC) 08/27/2021    Anxiety     Cardiac disease     Chest pain 08/27/2021    Chronic pain disorder     COPD (chronic obstructive pulmonary disease) ([facility])     COVID-19 02/15/2024    Depression     Heart disease     Hyperlipidemia     Hypertension     MI (myocardial infarction) ([facility])     MRSA (methicillin resistant Staphylococcus aureus)     Renal disorder     benign kidney tumor     Past Surgical History:   Procedure Laterality Date    APPENDECTOMY      CARDIAC CATHETERIZATION N/A 2/21/2024    Procedure: Cardiac catheterization;  Surgeon: Luke Malagon MD;  Location: [facility];  Service: Cardiology    CARDIAC CATHETERIZATION N/A 2/21/2024    Procedure: Cardiac Coronary Angiogram;  Surgeon: Luke Malagon MD;  Location: [facility];  Service: Cardiology    CARDIAC CATHETERIZATION Left 2/21/2024    Procedure: Cardiac Left Heart Cath;  Surgeon: Luke Malagon MD;  Location: [facility];  Service:  "Cardiology    ELBOW BURSA SURGERY Left 2018    D/T MRSA INFECTION    IR THORACENTESIS  2024    SPINAL FUSION      L7 L9     Social History     Substance and Sexual Activity   Alcohol Use Never     Social History     Substance and Sexual Activity   Drug Use No     Social History     Tobacco Use   Smoking Status Former    Current packs/day: 0.00    Average packs/day: 1 pack/day for 60.0 years (60.0 ttl pk-yrs)    Types: Cigarettes    Start date:     Quit date: 2016    Years since quittin.3   Smokeless Tobacco Never   Tobacco Comments    She has close to a 60 pack-year smoking history, most recently has cut down to 4-5 cigarettes daily     Family History: non-contributory    Meds/Allergies   Medications reviewed in PCC.  Pertinent cardiac medications include:  Aspirin, Entresto, Toprol- mg daily, pravastatin 80 mg daily, Norvasc 10 mg daily,    Allergies   Allergen Reactions    Sulfa Antibiotics Anaphylaxis    Iron GI Intolerance    Niacin     Statins Other (See Comments)     cramps       Objective   Vitals:  Reviewed in PCC and at the bedside with RN    Physical Exam:  GEN: Lauren Quintero is an elderly female, in wheelchair. O2 on via NC.  HEENT: pupils equal, round, and reactive to light; extraocular muscles intact  NECK: supple, no carotid bruits   HEART: regular rhythm, normal S1 and S2, no murmurs, clicks, gallops or rubs   LUNGS: clear to auscultation bilaterally; no wheezes, rales, or rhonchi   ABDOMEN: normal bowel sounds, soft, no tenderness, no distention  EXTREMITIES: peripheral pulses normal; no clubbing, cyanosis, or edema  NEURO: no focal findings   SKIN: normal without suspicious lesions on exposed skin    Lab Results:                       Invalid input(s): \"LABALBU\"            Imaging: I have personally reviewed pertinent films in PACS  XR chest portable    Result Date: 2024  Narrative: XR CHEST PORTABLE INDICATION: sob. COMPARISON: 2024 FINDINGS: Clear lungs. No " pneumothorax or pleural effusion. The patient has emphysema. Normal cardiomediastinal silhouette. Bones are unremarkable for age. Normal upper abdomen.     Impression: No acute cardiopulmonary disease. Emphysema. Workstation performed: [id number]     CT chest wo contrast    Result Date: 5/8/2024  Narrative: CT CHEST WITHOUT IV CONTRAST INDICATION: sob. COMPARISON: CT chest abdomen pelvis 5/2/2024. TECHNIQUE: CT examination of the chest was performed without intravenous contrast. Multiplanar 2D reformatted images were created from the source data. This examination, like all CT scans performed in the [facility], was performed utilizing techniques to minimize radiation dose exposure, including the use of iterative reconstruction and automated exposure control. Radiation dose length product (DLP) for this visit: 190 mGy-cm FINDINGS: LUNGS: Diffuse emphysematous changes and mild bronchiectasis/bronchial wall thickening likely related to chronic bronchitis. Increased patchy dependent opacities in the left lower lobe (example series 4 image 59) and focal consolidative opacity in the posterior medial left lower lobe (example series 4 image 82). Increased mild groundglass and patchy opacity in the posterior aspect of the left upper lobe along the fissure (example series 4 images 51-53). Stable band of opacification in the left upper lobe (series 4 image 24). Mixture of reticular and nodular opacities in the right lower lobe and mild dependent consolidation are not significantly changed. Unchanged atelectasis of the right middle lobe. Unchanged patchy/nodular opacities in the right upper lobe (for example series 4 images 25 and 50). PLEURA: Unremarkable. HEART/GREAT VESSELS: Heart is unremarkable for patient's age. No thoracic aortic aneurysm. Coronary artery and aortic calcifications. MEDIASTINUM AND ARABELLA: Small hiatal hernia. CHEST WALL AND LOWER NECK: Unremarkable. VISUALIZED STRUCTURES IN THE UPPER  ABDOMEN: Left renal cyst. OSSEOUS STRUCTURES: No acute fracture or destructive osseous lesion. Vertebroplasty changes at T7 and T8. Unchanged multilevel compression deformities.     Impression: Mild increase in dependent opacities in the left lung with new focal consolidation at the posteromedial left base. While these may be due to atelectasis, cannot exclude a component of worsening infection. Otherwise stable bilateral opacities on a background of emphysema. Recommendation remains for 3-month follow-up chest CT to ensure stability/resolution. The study was marked in EPIC for immediate notification. Workstation performed: [id number]     XR chest portable ICU    Result Date: 5/4/2024  Narrative: XR CHEST PORTABLE ICU INDICATION: SOB. COMPARISON: CXR and chest CT 5/2/2024. FINDINGS: Mild pulmonary venous congestion. Mild bilateral subsegmental atelectasis/scar. No pneumothorax or pleural effusion. Normal cardiomediastinal silhouette. Life vest. Vertebral augmentation. Normal upper abdomen.     Impression: Mild pulmonary venous congestion. Workstation performed: [id number]     XR chest portable    Result Date: 5/3/2024  Narrative: XR CHEST PORTABLE INDICATION: fever. COMPARISON: 3/4/2024 FINDINGS: The field-of-view especially obscured by overlying artifacts. Clear visible lungs. No pneumothorax or pleural effusion. Normal cardiomediastinal silhouette. Redemonstrated valvuloplasties in the thoracic spine. Normal upper abdomen.     Impression: No acute cardiopulmonary disease. Workstation performed: [id number]     CT chest abdomen pelvis wo contrast    Result Date: 5/2/2024  Narrative: CT CHEST, ABDOMEN AND PELVIS WITHOUT IV CONTRAST INDICATION: Sepsis. COMPARISON: Multiple priors most recently same day radiograph TECHNIQUE: CT examination of the chest, abdomen and pelvis was performed without intravenous contrast. Multiplanar 2D reformatted images were created from the source data. This examination, like all CT scans  performed in the [facility], was performed utilizing techniques to minimize radiation dose exposure, including the use of iterative reconstruction and automated exposure control. Radiation dose length product (DLP) for this visit: 593 mGy-cm Enteric Contrast: Not administered. FINDINGS: CHEST LUNGS: Persistent opacities in the bilateral upper lobes and right middle lobe. Evaluation of the lower lobes degraded due to respiratory motion artifact. PLEURA: Unremarkable. HEART/GREAT VESSELS: Heart is unremarkable for patient's age.No thoracic aortic aneurysm. MEDIASTINUM AND ARABELLA: Unremarkable. CHEST WALL AND LOWER NECK: Unremarkable. ABDOMEN LIVER/BILIARY TREE: Unremarkable. GALLBLADDER: No calcified gallstones. No pericholecystic inflammatory change. SPLEEN: Unremarkable. PANCREAS: Unremarkable. ADRENAL GLANDS: Unremarkable. KIDNEYS/URETERS: Asymmetric right perinephric stranding. STOMACH AND BOWEL: Colonic diverticulosis without findings of acute diverticulitis. APPENDIX: No findings to suggest appendicitis. ABDOMINOPELVIC CAVITY: No ascites. No pneumoperitoneum. No lymphadenopathy. VESSELS: Atherosclerosis without abdominal aortic aneurysm. PELVIS REPRODUCTIVE ORGANS: Unchanged 2.7 cm right adnexal cyst URINARY BLADDER: Decompressed by Gu[place]ama catheter. ABDOMINAL WALL/INGUINAL REGIONS: Unremarkable. BONES: No acute fracture or suspicious osseous lesion. Spinal degenerative changes. Stable multilevel vertebral body height loss. Vertebral augmentation at T7 and T8     Impression: New asymmetric right perinephric stranding. Correlate for pyelonephritis. Persistent multifocal consolidations may represent sequela of prior pneumonia. However, recurrent pneumonia or chronic infectious or inflammatory process is not excluded. Recommend 3-month follow-up noncontrast CT of the chest to assess stability/resolution. The study was marked in EPIC for immediate notification. Workstation performed: [id number]     CT  spine lumbar wo contrast    Result Date: 4/29/2024  Narrative: CT SPINE LUMBAR WO CONTRAST INDICATION:   M54.9: Dorsalgia, unspecified. COMPARISON: 12/18/2018 TECHNIQUE:  Contiguous axial images through the lumbar spine were obtained. Sagittal and coronal reconstructions were performed. IV Contrast: None. Radiation dose length product (DLP) for this visit:  842.09 mGy-cm .  This examination, like all CT scans performed in the [facility], was performed utilizing techniques to minimize radiation dose exposure, including the use of iterative  reconstruction and automated exposure control. IMAGE QUALITY:  Diagnostic. FINDINGS: ALIGNMENT:  There are 5 lumbar type vertebral bodies. Grade 1 anterolisthesis of L5-S1. VERTEBRAE: Bones are osteopenic. There are multilevel compression deformities. - There is stable mild loss of height of the T12 vertebral body. - There is moderate to severe central loss of height of the L1 vertebral body, stable. - There is age-indeterminate mild loss of height of the L2 vertebral body, new. There is sclerosis along the superior endplate. - There is stable moderate loss of height of the L4 vertebral body. - There is mild loss of height of the L5 vertebral body, stable. There is also a chronic appearing fracture deformity of the distal sacrum at S3, new. DEGENERATIVE CHANGES: Lower Thoracic spine: T12-L1 there is bony retropulsion. There is facet arthrosis. There is mild canal stenosis. There is mild foraminal narrowing. L1-2: There is vacuum disc phenomenon. There is a bulging annulus. There is facet arthrosis. There is no significant canal stenosis. There is mild foraminal narrowing. L2-3: There is vacuum disc phenomenon. There is disc space narrowing. There is a bulging annulus. There is facet arthrosis. There is moderate canal stenosis. There is moderate left foraminal narrowing. L3-4: There is a bulging annulus. There is facet arthrosis with ligamentum flavum thickening.  There is moderate to severe canal stenosis. There is moderate foraminal narrowing. L4-5: There is a bulging annulus. There is facet arthrosis. There is lateral recess stenosis. There is mild to moderate canal stenosis. There is mild foraminal narrowing. L5-S1: There is uncovering the disc space. There is facet arthrosis. There is moderate bilateral foraminal narrowing. PARASPINAL SOFT TISSUES:  Normal. OTHER: Right adnexal cyst. Colonic diverticulosis. Left-sided nephrolithiasis. Hyperdense left renal cyst is likely hemorrhagic or proteinaceous in nature. There is nonspecific stranding in the left hemipelvis however this is not clearly surrounding a diverticula.     Impression: Multilevel compression deformities of the lumbar spine, as described above and stable at T12, L1, L4 and L5. Age-indeterminate mild loss of height of the L2 vertebral body with sclerosis along the superior endplate. Correlate with focal point tenderness and if warranted MRI for further evaluation. Chronic appearing sacral fracture is new since the prior examination. Nonspecific stranding in the left hemipelvis is of unclear etiology. Findings do not appear to be clearly surrounding a diverticula however clinical correlation for the possibility of diverticulitis is recommended. The study was marked in EPIC for significant notification. Workstation performed: [id number]

## 2024-05-23 ENCOUNTER — NURSING HOME VISIT (OUTPATIENT)
Dept: GERIATRICS | Facility: OTHER | Age: 79
End: 2024-05-23
Payer: MEDICARE

## 2024-05-23 DIAGNOSIS — I10 ESSENTIAL HYPERTENSION: ICD-10-CM

## 2024-05-23 DIAGNOSIS — I50.42 CHRONIC COMBINED SYSTOLIC AND DIASTOLIC CHF (CONGESTIVE HEART FAILURE) (HCC): Primary | ICD-10-CM

## 2024-05-23 DIAGNOSIS — J44.9 CHRONIC OBSTRUCTIVE PULMONARY DISEASE, UNSPECIFIED COPD TYPE (HCC): ICD-10-CM

## 2024-05-23 DIAGNOSIS — R26.2 AMBULATORY DYSFUNCTION: ICD-10-CM

## 2024-05-23 DIAGNOSIS — A41.9 SEPTIC SHOCK (HCC): ICD-10-CM

## 2024-05-23 DIAGNOSIS — R65.21 SEPTIC SHOCK (HCC): ICD-10-CM

## 2024-05-23 DIAGNOSIS — J96.21 ACUTE ON CHRONIC RESPIRATORY FAILURE WITH HYPOXIA (HCC): ICD-10-CM

## 2024-05-23 PROCEDURE — 99309 SBSQ NF CARE MODERATE MDM 30: CPT

## 2024-05-28 ENCOUNTER — NURSING HOME VISIT (OUTPATIENT)
Dept: GERIATRICS | Facility: OTHER | Age: 79
End: 2024-05-28
Payer: MEDICARE

## 2024-05-28 VITALS
SYSTOLIC BLOOD PRESSURE: 102 MMHG | TEMPERATURE: 97.6 F | HEART RATE: 91 BPM | DIASTOLIC BLOOD PRESSURE: 64 MMHG | OXYGEN SATURATION: 95 %

## 2024-05-28 DIAGNOSIS — R26.2 AMBULATORY DYSFUNCTION: ICD-10-CM

## 2024-05-28 DIAGNOSIS — J96.21 ACUTE ON CHRONIC RESPIRATORY FAILURE WITH HYPOXIA (HCC): ICD-10-CM

## 2024-05-28 DIAGNOSIS — D64.9 ANEMIA, UNSPECIFIED TYPE: ICD-10-CM

## 2024-05-28 DIAGNOSIS — R53.1 GENERALIZED WEAKNESS: ICD-10-CM

## 2024-05-28 DIAGNOSIS — A41.9 SEPTIC SHOCK (HCC): Primary | ICD-10-CM

## 2024-05-28 DIAGNOSIS — I50.42 CHRONIC COMBINED SYSTOLIC AND DIASTOLIC CHF (CONGESTIVE HEART FAILURE) (HCC): ICD-10-CM

## 2024-05-28 DIAGNOSIS — R65.21 SEPTIC SHOCK (HCC): Primary | ICD-10-CM

## 2024-05-28 DIAGNOSIS — N18.9 ACUTE KIDNEY INJURY SUPERIMPOSED ON CHRONIC KIDNEY DISEASE  (HCC): ICD-10-CM

## 2024-05-28 DIAGNOSIS — E43 SEVERE PROTEIN-CALORIE MALNUTRITION (HCC): ICD-10-CM

## 2024-05-28 DIAGNOSIS — I10 ESSENTIAL HYPERTENSION: ICD-10-CM

## 2024-05-28 DIAGNOSIS — F33.1 MAJOR DEPRESSIVE DISORDER, RECURRENT EPISODE, MODERATE (HCC): ICD-10-CM

## 2024-05-28 DIAGNOSIS — C88.0 WALDENSTROM MACROGLOBULINEMIA (HCC): ICD-10-CM

## 2024-05-28 DIAGNOSIS — N17.9 ACUTE KIDNEY INJURY SUPERIMPOSED ON CHRONIC KIDNEY DISEASE  (HCC): ICD-10-CM

## 2024-05-28 PROCEDURE — 99310 SBSQ NF CARE HIGH MDM 45: CPT | Performed by: NURSE PRACTITIONER

## 2024-05-28 RX ORDER — UMECLIDINIUM BROMIDE AND VILANTEROL TRIFENATATE 62.5; 25 UG/1; UG/1
1 POWDER RESPIRATORY (INHALATION) DAILY
Start: 2024-05-28 | End: 2024-06-27

## 2024-05-28 NOTE — ASSESSMENT & PLAN NOTE
BP appears stable   Continue metoprolol 100 mg daily   Continue entresto   Continue amlodipine 10 mg daily   Monitor BP

## 2024-05-28 NOTE — ASSESSMENT & PLAN NOTE
In the setting of recent pneumonia  Appears to be at baseline   Continue supplemental O2 (chronically on 4L)  Continue inhalers and nebs   Monitor respiratory status

## 2024-05-28 NOTE — PROGRESS NOTES
"[address], [facility] [address]  [phone]  [facility]  POS 31  PROGRESS NOTE        NAME: Lauren Quintero  AGE: 78 y.o. SEX: female  :  1945  DATE OF ENCOUNTER: 2024    Chief Complaint   Patient seen and examined for follow up on chronic conditions.     History of Present Illness     Lauren Quintero is a patient of [facility] with acute and chronic medical conditions of CHF, CAD, hypertension, non-STEMI, chronic respiratory failure with hypoxia and hypercapnia, COPD, BR BPR, CKD, depression, anemia, generalized weakness, insomnia, abnormal chest x-ray, abnormal CTA, diverticulitis, dizziness, fatigue, loose stools, moderate protein calorie malnutrition, and ambulatory dysfunction.    The patient is being seen and examined today for follow-up on acute and chronic medical conditions. Upon examination, the patient is sitting in her bed, alert, cooperative, and in no acute distress.      The patient reports that she has back and neck pain today.  She states that pain is in lower back and radiates around to flank.  She also states that her neck feels \"stiff\" today.  I made patient aware that Tylenol has been scheduled 975 mg po TID and that I will order muscle cream rub to also be administered BID.      PT/OT have been providing therapy treatment for restorative services.     Patient was hospitalized 2024 with underlying history of hypertension/CKD/depression/anemia/COPD-chronic hypoxic respiratory failure/CAD s/p CABG with LifeVest/CHF presenting in septic shock.  Symptoms of lethargy/fevers for several days.  Noted to be hypotensive and admitted for pressors.     -Septic shock now resolved.  Likely due to pyelonephritis/pneumonia seen on CT imaging.  Patient s/p fluid resuscitation/IV antibiotics.  2 out of 2 blood cultures growing pansensitive E. coli, repeat blood cultures negative.  MRSA negative.  Strep pneumococcal/Legionella antigen negative.  Initially receiving " cefepime/vancomycin and subsequently IV ceftriaxone.  Now hemodynamically stable.     -Acute on chronic hypoxic respiratory failure with recent history of COVID-19 in 224.  CT chest noting persistent multifocal consolidations likely sequelae of prior pneumonia.  Did complete 7-day IV antibiotics.     - Acute on chronic kidney failure, on admission creatinine of 2.99 likely multifactorial secondary to poor oral intake as well as ATN from septic shock.     - Anemia, at baseline.  EGD in 2023 noted mild erosive gastritis with duodenal bulb angiectasia s/p APC.  Colonoscopy in 2023 which noted left-sided diverticulosis with old blood clots likely indicating a diverticular bleed.  Had normal outpatient video capsule endoscopy.  Hemoglobin 8.7 today at discharge.  Patient will be admitted to [facility] ([place], [place]) and continue to be maintained on 4L of supplemental 02.    The following portions of the patient's history were reviewed and updated as appropriate: allergies, current medications, past family history, past medical history, past social history, past surgical history and problem list.    Review of Systems     Review of Systems   Constitutional:  Positive for activity change and fatigue. Negative for chills and fever.   HENT:  Negative for ear pain and sore throat.    Eyes:  Negative for pain and visual disturbance.   Respiratory:  Negative for cough and shortness of breath.    Cardiovascular:  Negative for chest pain and palpitations.   Gastrointestinal:  Positive for diarrhea. Negative for abdominal pain and vomiting.   Genitourinary:  Negative for dysuria and hematuria.   Musculoskeletal:  Positive for back pain, gait problem and neck pain. Negative for arthralgias.   Skin:  Negative for color change and rash.   Neurological:  Positive for weakness. Negative for seizures and syncope.   Hematological:  Bruises/bleeds easily.   Psychiatric/Behavioral:  Negative for sleep disturbance.    All other systems reviewed and are  negative.       History     Past Medical History:   Diagnosis Date    Acute congestive heart failure (HCC) 2021    Anxiety     Cardiac disease     Chest pain 2021    Chronic pain disorder     COPD (chronic obstructive pulmonary disease) ([facility])     COVID-19 02/15/2024    Depression     Heart disease     Hyperlipidemia     Hypertension     MI (myocardial infarction) ([facility])     MRSA (methicillin resistant Staphylococcus aureus)     Renal disorder     benign kidney tumor     Past Surgical History:   Procedure Laterality Date    APPENDECTOMY      CARDIAC CATHETERIZATION N/A 2024    Procedure: Cardiac catheterization;  Surgeon: Luke Malagon MD;  Location: [facility];  Service: Cardiology    CARDIAC CATHETERIZATION N/A 2024    Procedure: Cardiac Coronary Angiogram;  Surgeon: Luke Malagon MD;  [facility]: [facility];  Service: Cardiology    CARDIAC CATHETERIZATION Left 2024    Procedure: Cardiac Left Heart Cath;  Surgeon: Luke Malagon MD;  [facility]: [facility];  Service: Cardiology    ELBOW BURSA SURGERY Left 2018    D/T MRSA INFECTION    IR THORACENTESIS  2024    SPINAL FUSION      L7 L9     Family History   Problem Relation Age of Onset    Heart disease Mother     Cancer Father      Social History     Socioeconomic History    Marital status:      Spouse name: Not on file    Number of children: 3    Years of education: 13    Highest education level: High school graduate   Occupational History    Occupation:      Employer: [employer]     Comment: retired    Tobacco Use    Smoking status: Former     Current packs/day: 0.00     Average packs/day: 1 pack/day for 60.0 years (60.0 ttl pk-yrs)     Types: Cigarettes     Start date:      Quit date: 2016     Years since quittin.4    Smokeless tobacco: Never    Tobacco comments:     She has close to a 60 pack-year smoking history, most recently has cut down to  4-5 cigarettes daily   Vaping Use    Vaping status: Never Used   Substance and Sexual Activity    Alcohol use: Never    Drug use: No    Sexual activity: Not Currently   Other Topics Concern    Not on file   Social History Narrative    Not on file     Social Determinants of Health     Financial Resource Strain: Patient Declined (1/6/2024)    Received from [facility]    Overall Financial Resource Strain (CARDIA)     Difficulty of Paying Living Expenses: Patient declined   Food Insecurity: No Food Insecurity (5/3/2024)    Hunger Vital Sign     Worried About Running Out of Food in the Last Year: Never true     Ran Out of Food in the Last Year: Never true   Transportation Needs: No Transportation Needs (5/3/2024)    PRAPARE - Transportation     Lack of Transportation (Medical): No     Lack of Transportation (Non-Medical): No   Physical Activity: Patient Declined (1/6/2024)    Received from [facility]    Exercise Vital Sign     Days of Exercise per Week: Patient declined     Minutes of Exercise per Session: Patient declined   Stress: Patient Declined (1/6/2024)    Received from [facility]    [employer] [employer] - Occupational Stress Questionnaire     Feeling of Stress : Patient declined   Social Connections: Patient Declined (1/6/2024)    Received from [facility]    Social Connection and Isolation Panel [NHANES]     Frequency of Communication with Friends and Family: Patient declined     Frequency of Social Gatherings with Friends and Family: Patient declined     Attends Tenriism Services: Patient declined     Active Member of Clubs or Organizations: Patient declined     Attends Club or Organization Meetings: Patient declined     Marital Status: Patient declined   Intimate Partner Violence: Patient Declined (1/6/2024)    Received from [facility]    Humiliation, Afraid, Rape, and  Kick questionnaire     Fear of Current or Ex-Partner: Patient declined     Emotionally Abused: Patient declined     Physically Abused: Patient declined     Sexually Abused: Patient declined   Housing Stability: Low Risk  (5/3/2024)    Housing Stability Vital Sign     Unable to Pay for Housing in the Last Year: No     Number of Places Lived in the Last Year: 1     Unstable Housing in the Last Year: No     Allergies   Allergen Reactions    Sulfa Antibiotics Anaphylaxis    Iron GI Intolerance    Niacin     Statins Other (See Comments)     cramps       Objective     Vital Signs  BP: 118/66       HR:89 T:97.4    RR:18 O2Sat:94% on 4L W:120.4  Physical Exam  Vitals reviewed.   Constitutional:       Appearance: She is ill-appearing.   Cardiovascular:      Rate and Rhythm: Normal rate. Rhythm irregular.      Heart sounds: Normal heart sounds.      Comments: Life vest in place  Pulmonary:      Breath sounds: No wheezing, rhonchi or rales.      Comments: Diminshed fields  Abdominal:      General: Bowel sounds are normal. There is no distension.      Palpations: Abdomen is soft.      Tenderness: There is no abdominal tenderness.   Musculoskeletal:      Cervical back: Normal range of motion.      Right lower leg: No edema.      Left lower leg: No edema.   Skin:     General: Skin is warm and dry.      Findings: No bruising (right arm).   Neurological:      Mental Status: She is alert and oriented to person, place, and time.      Motor: Weakness present.      Gait: Gait abnormal.   Psychiatric:         Mood and Affect: Mood normal.         Behavior: Behavior normal.        Pertinent Laboratory/Diagnostic Studies have been independently reviewed.      Current Medications     Current Medications Reviewed and updated in Nursing Home EMR.    Assessment and Plan     Septic shock (HCC)  Recent septic shock secondary to PNA/pyelonephritis as seen on CT imaging  BC positive for E. Coli  Patient is s/p IV antibiotic Cefepime and  Vancomycin  Continue to monitor for acute changes in condition    Generalized weakness  Maintain fall and safety precautions  Encourage and remind use of call bell and asst devices  Continue PT/OT services  Assist with transfers, mobility, and ADLs as needed  Continue to monitor for acute changes in condition     Ambulatory dysfunction  Hx of falls  Maintain fall and safety precautions  Encourage and remind of use of call bell and asst devices  Continue PT/OT services  Assist with transfers, mobility, and ADLs as needed  Continue to monitor for acute changes in condition     Acute kidney injury superimposed on chronic kidney disease  (HCC)  Lab Results   Component Value Date    EGFR 32 05/15/2024    EGFR 33 05/14/2024    EGFR 28 05/13/2024    CREATININE 1.54 (H) 05/15/2024    CREATININE 1.50 (H) 05/14/2024    CREATININE 1.73 (H) 05/13/2024   Last Creatinine 1.66 /Gfr 32, stable  Will continue to monitor BMP periodically  Avoid nephrotoxins and NSAIDS  Avoid hypotension  Continue to monitor for acute changes in condition     Acute on chronic respiratory failure with hypoxia (HCC)  Secondary to COPD exacerbation and PNA now resolved  Patient received IV abx  Continue Ventolin, Advair, and Atrovent  Continue supplemental oxygen  Patient denies sob today  Patients lungs clear on assessment today  Sa02 94% on 4L via NC  CT of chest revealed persistent multifocal consolidations with recommended repeat CT in 3 mo.  Patient will be seen during Pulm rounds at [facility]      Chronic combined systolic and diastolic CHF (congestive heart failure) (HCC)  Current wt 120.4, remains stable  EF 35%, patient currently has Life Vest in place   Will check BMP periodically  Continue Entresto 24-26 BID  Continue MAI diet  Patient will be seen by Cardiology rounds during [facility] stay                  Essential hypertension  BP today remains stable, 118/66  Continue Metoprolol Succ 100 mg QD  Avoid hypotension  Continue to monitor Bp      Major  "depressive disorder, recurrent episode, moderate (HCC)  Continue reassurance and supportive measures  Continue Vraylar, Viibryd, and Wellbutrin  Encourage activity and engagement  Will consult psychiatric services prn  Continue to monitor for acute changes in condition    Anemia  5/16 most recent Hgb 10.1/Hct 32  Will continue to monitor CBC periodically  Monitor for signs and symptoms of bleeding  Continue to monitor patient for acute changes in condition     Severe protein-calorie malnutrition (HCC)  Malnutrition Findings:   Multifactorial including acute illness, inadequate energy, weight loss, muscle wasting, and fat loss  Continue to monitor weight and BMI   Continue all nutritional supplements  Consult nutritional services prn  Monitor for acute changes in condition     Waldenstrom macroglobulinemia (HCC)  History of Waldenstrom macroglobulinemia  Continue Zanubrutinib, provided by family  Follow up with Heme/Onc    Portions of this note may have been written with use of the previous note summary. Assessment and Plan of care have been reviewed and updated as appropriate.  In addition, voice-recognition software may have been used in the preparation of this document. Occasional wrong word or \"sound-alike\" substitutions may have occurred due to the inherent limitations of voice recognition software. Interpretation should be guided by context.      Gauri CHO  Geriatric Medicine  5/28/2024          "

## 2024-05-28 NOTE — ASSESSMENT & PLAN NOTE
Seen by pulmonology at rehab   Continue duonebs and Anoro inhaler   Chronically on 4L NC however currently on 1.5L satting 95%

## 2024-05-28 NOTE — PROGRESS NOTES
[facility]  [address] [address]  [phone]  FACILITY: [facility]  Code 31 (STR)        NAME: Lauren Quintero  AGE: 78 y.o. SEX: female CODE STATUS: No CPR    DATE OF ENCOUNTER: 5/23/24    Assessment and Plan     1. Chronic combined systolic and diastolic CHF (congestive heart failure) ([facility])  Assessment & Plan:  Wt Readings from Last 3 Encounters:   05/22/24 57.6 kg (127 lb)   05/16/24 57.9 kg (127 lb 9.6 oz)   05/15/24 50.6 kg (111 lb 8.8 oz)     Discharged on Lifevest, was seen by Cardiology yesterday- EF on recent echo improved from 35 to 45%, no longer requires Lifevest   Continue entresto and metoprolol   Appears euvolemic   Monitor weights   Encourage cardiac diet   2. Essential hypertension  Assessment & Plan:  BP appears stable   Continue metoprolol 100 mg daily   Continue entresto   Continue amlodipine 10 mg daily   Monitor BP  3. Acute on chronic respiratory failure with hypoxia ([facility])  Assessment & Plan:  In the setting of recent pneumonia  Appears to be at baseline   Continue supplemental O2 (chronically on 4L)  Continue inhalers and nebs   Monitor respiratory status   4. Chronic obstructive pulmonary disease, unspecified COPD type ([facility])  Assessment & Plan:  Seen by pulmonology at rehab   Continue duonebs and Anoro inhaler   Chronically on 4L NC however currently on 1.5L satting 95%  Orders:  -     umeclidinium-vilanterol (Anoro Ellipta) 62.5-25 mcg/actuation inhaler; Inhale 1 puff daily  5. Ambulatory dysfunction  Assessment & Plan:  Multifactorial  Continue PT/OT  Maintain fall and safety precautions   following for discharge planning]    6. Septic shock ([facility])  Assessment & Plan:  In the setting of pneumonia/pyelonephritis   Blood cultures revealed E choli   S/p antibiotic course   Remains afebrile          All medications and routine orders were reviewed and updated as needed.    Chief Complaint     STR follow up visit    Past Medical and Surgica History       Past Medical History:   Diagnosis Date    Acute congestive heart failure (HCC) 08/27/2021    Anxiety     Cardiac disease     Chest pain 08/27/2021    Chronic pain disorder     COPD (chronic obstructive pulmonary disease) ([facility])     COVID-19 02/15/2024    Depression     Heart disease     Hyperlipidemia     Hypertension     MI (myocardial infarction) ([facility])     MRSA (methicillin resistant Staphylococcus aureus)     Renal disorder     benign kidney tumor     Past Surgical History:   Procedure Laterality Date    APPENDECTOMY      CARDIAC CATHETERIZATION N/A 2/21/2024    Procedure: Cardiac catheterization;  Surgeon: Luke Malagon MD;  Location: [facility];  Service: Cardiology    CARDIAC CATHETERIZATION N/A 2/21/2024    Procedure: Cardiac Coronary Angiogram;  Surgeon: Luke Malagon MD;  Location: [facility];  Service: Cardiology    CARDIAC CATHETERIZATION Left 2/21/2024    Procedure: Cardiac Left Heart Cath;  Surgeon: Luke Malagon MD;  Location: [facility];  Service: Cardiology    ELBOW BURSA SURGERY Left 02/2018    D/T MRSA INFECTION    IR THORACENTESIS  2/27/2024    SPINAL FUSION      L7 L9     Allergies   Allergen Reactions    Sulfa Antibiotics Anaphylaxis    Iron GI Intolerance    Niacin     Statins Other (See Comments)     cramps          History of Present Illness     Patient is a 78 y.o. old female being seen at [facility] for follow up of acute and chronic medical conditions. She is seen and examined sitting in bed without acute distress. She remains on supplemental oxygen which she wears chronically. She has no complaints today. Denies CP, nausea, vomiting, diarrhea, constipation, headaches or dizziness. Chronically SOB in setting of severe COPD. She reports no worsening of breathing. Seen by cardiology yesterday, she no longer needs to wear Lifevest and does not currently have it on.           The patient's allergies, past medical, surgical, social and family history were  reviewed and unchanged.    Review of Systems     Review of Systems   Musculoskeletal:  Positive for gait problem.   Neurological:  Positive for weakness.   All other systems reviewed and are negative.        Objective     Vitals:   Vitals:    05/23/24 1010   BP: 102/64   Pulse: 91   Temp: 97.6 °F (36.4 °C)   SpO2: 95%         Physical Exam  Vitals reviewed.   Constitutional:       General: She is not in acute distress.     Appearance: She is ill-appearing. She is not toxic-appearing or diaphoretic.      Comments: Chronically ill appearing   HENT:      Head: Normocephalic and atraumatic.   Eyes:      Conjunctiva/sclera: Conjunctivae normal.   Cardiovascular:      Rate and Rhythm: Normal rate and regular rhythm.      Pulses: Normal pulses.      Heart sounds: Normal heart sounds. No murmur heard.  Pulmonary:      Effort: Pulmonary effort is normal. No respiratory distress.      Breath sounds: Decreased air movement present. Decreased breath sounds present.   Abdominal:      General: Bowel sounds are normal. There is no distension.      Palpations: Abdomen is soft.      Tenderness: There is no abdominal tenderness.   Musculoskeletal:      Right lower leg: No edema.      Left lower leg: No edema.   Skin:     General: Skin is warm and dry.   Neurological:      General: No focal deficit present.      Mental Status: She is alert and oriented to person, place, and time.      Motor: Weakness present.   Psychiatric:         Mood and Affect: Mood normal.         Behavior: Behavior normal.         Thought Content: Thought content normal.         Pertinent Laboratory/Diagnostic Studies:   Reviewed in facility chart-stable      Current Medications   Medications reviewed and updated see facility MAR for details.      Current Outpatient Medications:     umeclidinium-vilanterol (Anoro Ellipta) 62.5-25 mcg/actuation inhaler, Inhale 1 puff daily, Disp: , Rfl:     albuterol (Ventolin HFA) 90 mcg/act inhaler, Inhale 2 puffs every 6 (six)  hours as needed for wheezing Do not start before April 29, 2024., Disp: 18 g, Rfl: 0    amLODIPine (NORVASC) 10 mg tablet, Take 1 tablet (10 mg total) by mouth daily, Disp: , Rfl:     aspirin (ECOTRIN LOW STRENGTH) 81 mg EC tablet, Take 1 tablet (81 mg total) by mouth daily for 15 days Do not start before April 29, 2024., Disp: 15 tablet, Rfl: 0    buPROPion (WELLBUTRIN) 75 mg tablet, Take 1 tablet (75 mg total) by mouth in the morning for 15 days Do not start before April 29, 2024., Disp: 15 tablet, Rfl: 0    Calcium 500 MG tablet, Take 1 tablet (500 mg total) by mouth 2 (two) times a day for 15 days Do not start before April 29, 2024., Disp: 30 tablet, Rfl: 0    cariprazine (Vraylar) 1.5 MG capsule, Take 1 capsule (1.5 mg total) by mouth daily for 15 days Do not start before April 29, 2024., Disp: 15 capsule, Rfl: 0    gabapentin (NEURONTIN) 100 mg capsule, Take 1 capsule (100 mg total) by mouth daily at bedtime for 15 days Do not start before April 29, 2024., Disp: 15 capsule, Rfl: 0    ipratropium (ATROVENT) 0.02 % nebulizer solution, Take 2.5 mL (0.5 mg total) by nebulization 3 (three) times a day for 15 days Do not start before April 29, 2024., Disp: 112.5 mL, Rfl: 0    metoprolol succinate (TOPROL-XL) 100 mg 24 hr tablet, Take 1 tablet (100 mg total) by mouth daily, Disp: , Rfl:     nitroglycerin (NITROSTAT) 0.4 mg SL tablet, Place 1 tablet (0.4 mg total) under the tongue every 5 (five) minutes as needed for chest pain for up to 15 days Do not start before April 29, 2024., Disp: 15 tablet, Rfl: 0    pantoprazole (PROTONIX) 40 mg tablet, Take 1 tablet (40 mg total) by mouth daily for 15 doses Do not start before April 29, 2024., Disp: 15 tablet, Rfl: 0    pravastatin (PRAVACHOL) 80 mg tablet, Take 1 tablet (80 mg total) by mouth every evening for 15 days Do not start before April 29, 2024., Disp: 15 tablet, Rfl: 0    sacubitril-valsartan (Entresto) 24-26 MG TABS, Take 1 tablet by mouth 2 (two) times a day for  "30 doses Do not start before April 29, 2024., Disp: 30 tablet, Rfl: 0    vilazodone (VIIBRYD) 40 mg tablet, Take 1 tablet (40 mg total) by mouth daily with breakfast for 15 days Do not start before April 29, 2024., Disp: 15 tablet, Rfl: 0    Zanubrutinib 80 MG CAPS, Take 160 mg by mouth 2 (two) times a day, Disp: , Rfl:        Please note:  Voice-recognition software may have been used in the preparation of this document.  Occasional wrong word or \"sound-alike\" substitutions may have occurred due to the inherent limitations of voice recognition software.  Interpretation should be guided by context.         TAMMIE Rojo    "

## 2024-05-28 NOTE — ASSESSMENT & PLAN NOTE
Multifactorial  Continue PT/OT  Maintain fall and safety precautions   following for discharge planning]

## 2024-05-28 NOTE — ASSESSMENT & PLAN NOTE
In the setting of pneumonia/pyelonephritis   Blood cultures revealed E choli   S/p antibiotic course   Remains afebrile

## 2024-05-28 NOTE — ASSESSMENT & PLAN NOTE
Wt Readings from Last 3 Encounters:   05/22/24 57.6 kg (127 lb)   05/16/24 57.9 kg (127 lb 9.6 oz)   05/15/24 50.6 kg (111 lb 8.8 oz)     Discharged on Lifevest, was seen by Cardiology yesterday- EF on recent echo improved from 35 to 45%, no longer requires Lifevest   Continue entresto and metoprolol   Appears euvolemic   Monitor weights   Encourage cardiac diet

## 2024-06-02 NOTE — ASSESSMENT & PLAN NOTE
4 Eyes Skin Assessment     NAME:  Júnior Girard  YOB: 1972  MEDICAL RECORD NUMBER:  [id number]    The patient is being assessed for  Shift Handoff    I agree that at least one RN has performed a thorough Head to Toe Skin Assessment on the patient. ALL assessment sites listed below have been assessed.      Areas assessed by both nurses:    Head, Face, Ears, Shoulders, Back, Chest, Arms, Elbows, Hands, Sacrum. Buttock, Coccyx, Ischium, Legs. Feet and Heels, and Under Medical Devices         Does the Patient have a Wound? Yes wound(s) were present on assessment. LDA wound assessment was Initiated and completed by RN       Norman Prevention initiated by RN: Yes  Wound Care Orders initiated by RN: Yes    Pressure Injury (Stage 3,4, Unstageable, DTI, NWPT, and Complex wounds) if present, place Wound referral order by RN under : No    New Ostomies, if present place, Ostomy referral order under : No     Nurse 1 eSignature: Electronically signed by Kristine Farfan RN on 6/2/24 at 6:16 PM EDT    **SHARE this note so that the co-signing nurse can place an eSignature**    Nurse 2 eSignature: Electronically signed by Randy Ibarra RN on 6/3/24 at 1:35 AM EDT     History of combined CHF with the most recent EF of 35%  Pt took off Life vest : reports she will wear at home.  Continue telemetry monitor.  GDMT includes Toprol XL, Entresto  Resume Toprol-XL, continue to hold Entresto since DARLING improving.  Monitor strict intake/output  Monitor weights

## 2024-06-03 ENCOUNTER — NURSING HOME VISIT (OUTPATIENT)
Dept: GERIATRICS | Facility: OTHER | Age: 79
End: 2024-06-03
Payer: MEDICARE

## 2024-06-03 VITALS
TEMPERATURE: 98.2 F | OXYGEN SATURATION: 96 % | BODY MASS INDEX: 18.95 KG/M2 | WEIGHT: 121 LBS | HEART RATE: 75 BPM | RESPIRATION RATE: 18 BRPM | DIASTOLIC BLOOD PRESSURE: 68 MMHG | SYSTOLIC BLOOD PRESSURE: 115 MMHG

## 2024-06-03 DIAGNOSIS — D64.9 ANEMIA, UNSPECIFIED TYPE: ICD-10-CM

## 2024-06-03 DIAGNOSIS — J44.9 CHRONIC OBSTRUCTIVE PULMONARY DISEASE, UNSPECIFIED COPD TYPE (HCC): ICD-10-CM

## 2024-06-03 DIAGNOSIS — I25.118 CORONARY ARTERY DISEASE OF NATIVE ARTERY OF NATIVE HEART WITH STABLE ANGINA PECTORIS (HCC): ICD-10-CM

## 2024-06-03 DIAGNOSIS — I10 ESSENTIAL HYPERTENSION: Primary | ICD-10-CM

## 2024-06-03 DIAGNOSIS — I50.42 CHRONIC COMBINED SYSTOLIC AND DIASTOLIC CHF (CONGESTIVE HEART FAILURE) (HCC): ICD-10-CM

## 2024-06-03 DIAGNOSIS — E44.0 MODERATE PROTEIN-CALORIE MALNUTRITION (HCC): ICD-10-CM

## 2024-06-03 DIAGNOSIS — R26.2 AMBULATORY DYSFUNCTION: ICD-10-CM

## 2024-06-03 DIAGNOSIS — F33.1 MAJOR DEPRESSIVE DISORDER, RECURRENT EPISODE, MODERATE (HCC): ICD-10-CM

## 2024-06-03 DIAGNOSIS — A41.9 SEPTIC SHOCK (HCC): ICD-10-CM

## 2024-06-03 DIAGNOSIS — R53.1 GENERALIZED WEAKNESS: ICD-10-CM

## 2024-06-03 DIAGNOSIS — N18.31 STAGE 3A CHRONIC KIDNEY DISEASE (HCC): ICD-10-CM

## 2024-06-03 DIAGNOSIS — R65.21 SEPTIC SHOCK (HCC): ICD-10-CM

## 2024-06-03 PROCEDURE — 99309 SBSQ NF CARE MODERATE MDM 30: CPT | Performed by: INTERNAL MEDICINE

## 2024-06-04 NOTE — PROGRESS NOTES
[facility]  [address]   [address]  [place], [address] [phone]    Progress Note  Code SNF 31    Patient Location     [facility]    Reason for visit     Follow-up CHF, hypertension, chronic respiratory failure, COPD, ambulatory dysfunction, recent hospitalization for septic shock      Patient’s care was coordinated with nursing facility staff. Recent vitals, labs and updated medications [facility].     Problem List Items Addressed This Visit          Cardiovascular and Mediastinum    Essential hypertension - Primary     Patient's blood pressure has been running on the softer side 100/68 earlier today.  92/61 this a.m.  Patient currently remains on amlodipine 10 mg daily, Metoprolol 100 mg daily and Entresto 1 tablet twice a day.  Will DC amlodipine and follow.  Avoid hypotension         Coronary artery disease of native artery of native heart with stable angina pectoris (HCC)     Stable.  No angina symptoms. Continue Metoprolol, PRN Nitro and Pravastatin.  Pt is currently not on Aspirin. Will resume  Pt has declined any surgical intervention. Recent cardiology f.u notes were reviewed         Chronic combined systolic and diastolic CHF (congestive heart failure) (HCC)     Wt Readings from Last 3 Encounters:   06/02/24 54.9 kg (121 lb)   05/22/24 57.6 kg (127 lb)   05/16/24 57.9 kg (127 lb 9.6 oz)       Clinically compensated. Pt is currently not on any maintenance diuretics                Respiratory    COPD (chronic obstructive pulmonary disease) (HCC)     Stable. No bronchospasm. Continue nebs, Anoro ellipta and PRN Ventolin            Genitourinary    CKD (chronic kidney disease)     Lab Results   Component Value Date    EGFR 32 05/15/2024    EGFR 33 05/14/2024    EGFR 28 05/13/2024    CREATININE 1.54 (H) 05/15/2024    CREATININE 1.50 (H) 05/14/2024    CREATININE 1.73 (H) 05/13/2024   Creatinine was stable at baseline on  5/16.  Avoid hypotension and nephrotoxins            Behavioral Health    Major depressive disorder, recurrent episode, moderate (HCC)     History of depression.  Patient remains Vrylar 1.5 mg daily, bupropion 75 mg daily and Vilazdone 40 mg daily            Blood    Anemia     Recent HGB was stable at 10.1. Continue to monitor periodically            Care Coordination    Ambulatory dysfunction     Continue PT/OT            Surgery/Wound/Pain    Septic shock (HCC)     Patient was recently hospitalized with septic shock related to pneumonia/pyelonephritis as seen on CT imaging.  Blood cultures revealed growth of E. coli.  She was initially started on cefepime and vancomycin subsequently switched over to IV vancomycin.  Patient completed antibiotic course prior to discharge.  Urine strep antigen and antigens were negative.  Remains afebrile. WBC count was normal at 8.9 on 5/16            Neurology/Sleep    Generalized weakness     Continue PT/OT            Other    Moderate protein-calorie malnutrition (HCC)     Malnutrition Findings:                      Pt is noted to be weak and frail with generalized muscle wasting. Encourage po intake. Monitor weights.  F.U with dietitian           BMI Findings:           Body mass index is 18.95 kg/m².             Waldenstrom macroglobulinemia (HCC)  History of Waldenström's macroglobulinemia.  Follow-up with heme-onc.  Patient remains on zanubrutinib      HPI         Patient is being seen for follow-up visit today.  She is noted to be more awake and alert, progressing well with therapy.  No dyspnea fever chills or chest congestion.  SaO2 has been stable at 97% on chronic oxygen.  Patient is continent of bowel and bladder.  No signs of CHF clinically.  No recent weight gain.      Review of Systems   Constitutional:  Negative for chills and fever.   Respiratory:  Negative for shortness of breath and wheezing.    Cardiovascular:  Negative for chest pain.   Gastrointestinal:   Negative for abdominal distention, abdominal pain and vomiting.   Genitourinary:  Negative for flank pain and hematuria.   Musculoskeletal:  Positive for gait problem.        Denies any back pain at present   Neurological:  Positive for weakness. Negative for seizures and syncope.   Psychiatric/Behavioral:  Negative for agitation and behavioral problems.        Past Medical History:   Diagnosis Date    Acute congestive heart failure ([facility]) 2021    Anxiety     Cardiac disease     Chest pain 2021    Chronic pain disorder     COPD (chronic obstructive pulmonary disease) ([facility])     COVID-19 02/15/2024    Depression     Heart disease     Hyperlipidemia     Hypertension     MI (myocardial infarction) ([facility])     MRSA (methicillin resistant Staphylococcus aureus)     Renal disorder     benign kidney tumor       Past Surgical History:   Procedure Laterality Date    APPENDECTOMY      CARDIAC CATHETERIZATION N/A 2024    Procedure: Cardiac catheterization;  Surgeon: Luke Malagon MD;  Location: [facility];  Service: Cardiology    CARDIAC CATHETERIZATION N/A 2024    Procedure: Cardiac Coronary Angiogram;  Surgeon: Luke Malagon MD;  [facility]: [facility];  Service: Cardiology    CARDIAC CATHETERIZATION Left 2024    Procedure: Cardiac Left Heart Cath;  Surgeon: Luke Malagon MD;  [facility]: [facility];  Service: Cardiology    ELBOW BURSA SURGERY Left 2018    D/T MRSA INFECTION    IR THORACENTESIS  2024    SPINAL FUSION      L7 L9       Social History     Tobacco Use   Smoking Status Former    Current packs/day: 0.00    Average packs/day: 1 pack/day for 60.0 years (60.0 ttl pk-yrs)    Types: Cigarettes    Start date:     Quit date:     Years since quittin.4   Smokeless Tobacco Never   Tobacco Comments    She has close to a 60 pack-year smoking history, most recently has cut down to 4-5 cigarettes daily       Family History   Problem Relation Age of  Onset    Heart disease Mother     Cancer Father         Allergies   Allergen Reactions    Sulfa Antibiotics Anaphylaxis    Iron GI Intolerance    Niacin     Statins Other (See Comments)     cramps         Current Outpatient Medications:     amLODIPine (NORVASC) 10 mg tablet, Take 1 tablet (10 mg total) by mouth daily, Disp: , Rfl:     aspirin (ECOTRIN LOW STRENGTH) 81 mg EC tablet, Take 1 tablet (81 mg total) by mouth daily for 15 days Do not start before April 29, 2024., Disp: 15 tablet, Rfl: 0    buPROPion (WELLBUTRIN) 75 mg tablet, Take 1 tablet (75 mg total) by mouth in the morning for 15 days Do not start before April 29, 2024., Disp: 15 tablet, Rfl: 0    Calcium 500 MG tablet, Take 1 tablet (500 mg total) by mouth 2 (two) times a day for 15 days Do not start before April 29, 2024., Disp: 30 tablet, Rfl: 0    cariprazine (Vraylar) 1.5 MG capsule, Take 1 capsule (1.5 mg total) by mouth daily for 15 days Do not start before April 29, 2024., Disp: 15 capsule, Rfl: 0    gabapentin (NEURONTIN) 100 mg capsule, Take 1 capsule (100 mg total) by mouth daily at bedtime for 15 days Do not start before April 29, 2024., Disp: 15 capsule, Rfl: 0    ipratropium (ATROVENT) 0.02 % nebulizer solution, Take 2.5 mL (0.5 mg total) by nebulization 3 (three) times a day for 15 days Do not start before April 29, 2024., Disp: 112.5 mL, Rfl: 0    metoprolol succinate (TOPROL-XL) 100 mg 24 hr tablet, Take 1 tablet (100 mg total) by mouth daily, Disp: , Rfl:     nitroglycerin (NITROSTAT) 0.4 mg SL tablet, Place 1 tablet (0.4 mg total) under the tongue every 5 (five) minutes as needed for chest pain for up to 15 days Do not start before April 29, 2024., Disp: 15 tablet, Rfl: 0    pantoprazole (PROTONIX) 40 mg tablet, Take 1 tablet (40 mg total) by mouth daily for 15 doses Do not start before April 29, 2024., Disp: 15 tablet, Rfl: 0    pravastatin (PRAVACHOL) 80 mg tablet, Take 1 tablet (80 mg total) by mouth every evening for 15 days Do  not start before April 29, 2024., Disp: 15 tablet, Rfl: 0    sacubitril-valsartan (Entresto) 24-26 MG TABS, Take 1 tablet by mouth 2 (two) times a day for 30 doses Do not start before April 29, 2024., Disp: 30 tablet, Rfl: 0    umeclidinium-vilanterol (Anoro Ellipta) 62.5-25 mcg/actuation inhaler, Inhale 1 puff daily, Disp: , Rfl:     vilazodone (VIIBRYD) 40 mg tablet, Take 1 tablet (40 mg total) by mouth daily with breakfast for 15 days Do not start before April 29, 2024., Disp: 15 tablet, Rfl: 0    Zanubrutinib 80 MG CAPS, Take 160 mg by mouth 2 (two) times a day, Disp: , Rfl:     Updated list was reviewed in [facility] system of facility.     Vitals:    06/02/24 1121   BP: 115/68   Pulse: 75   Resp: 18   Temp: 98.2 °F (36.8 °C)   SpO2: 96%       Physical Exam  Constitutional:       General: She is not in acute distress.  HENT:      Head: Normocephalic and atraumatic.   Eyes:      General: No scleral icterus.  Cardiovascular:      Rate and Rhythm: Normal rate and regular rhythm.   Pulmonary:      Breath sounds: Rales (Chronic Rales at bases) present.   Abdominal:      General: There is no distension.      Palpations: Abdomen is soft.      Tenderness: There is no abdominal tenderness.   Musculoskeletal:      Cervical back: Neck supple.      Right lower leg: No edema.      Left lower leg: No edema.   Skin:     Coloration: Skin is not jaundiced.   Neurological:      General: No focal deficit present.      Cranial Nerves: No cranial nerve deficit.   Psychiatric:         Mood and Affect: Mood normal.         Behavior: Behavior normal.         Diagnostic Data:    Lab Results   Component Value Date    WBC 9.60 05/15/2024    HGB 8.7 (L) 05/15/2024    HCT 28.3 (L) 05/15/2024    MCV 96 05/15/2024     05/15/2024      Lab Results   Component Value Date    SODIUM 140 05/15/2024    K 4.3 05/15/2024     05/15/2024    CO2 30 05/15/2024    BUN 32 (H) 05/15/2024    CREATININE 1.54 (H) 05/15/2024    GLUC 88  "05/15/2024    CALCIUM 9.1 05/15/2024        Portions of the record may have been created with voice recognition software.  Occasional wrong word or \"sound a like\" substitutions may have occurred due to the inherent limitations of voice recognition software.  Read the chart carefully and recognize, using context, where substitutions have occurred.    This note was electronically signed by Dr. Annamarie Muhammad   "

## 2024-06-04 NOTE — ASSESSMENT & PLAN NOTE
Stable.  No angina symptoms. Continue Metoprolol, PRN Nitro and Pravastatin.  Pt is currently not on Aspirin. Will resume  Pt has declined any surgical intervention. Recent cardiology f.u notes were reviewed

## 2024-06-04 NOTE — ASSESSMENT & PLAN NOTE
Malnutrition Findings:                      Pt is noted to be weak and frail with generalized muscle wasting. Encourage po intake. Monitor weights.  F.U with dietitian           BMI Findings:           Body mass index is 18.95 kg/m².

## 2024-06-04 NOTE — ASSESSMENT & PLAN NOTE
Patient was recently hospitalized with septic shock related to pneumonia/pyelonephritis as seen on CT imaging.  Blood cultures revealed growth of E. coli.  She was initially started on cefepime and vancomycin subsequently switched over to IV vancomycin.  Patient completed antibiotic course prior to discharge.  Urine strep antigen and antigens were negative.  Remains afebrile. WBC count was normal at 8.9 on 5/16

## 2024-06-04 NOTE — ASSESSMENT & PLAN NOTE
Wt Readings from Last 3 Encounters:   06/02/24 54.9 kg (121 lb)   05/22/24 57.6 kg (127 lb)   05/16/24 57.9 kg (127 lb 9.6 oz)       Clinically compensated. Pt is currently not on any maintenance diuretics

## 2024-06-04 NOTE — ASSESSMENT & PLAN NOTE
Patient's blood pressure has been running on the softer side 100/68 earlier today.  92/61 this a.m.  Patient currently remains on amlodipine 10 mg daily, Metoprolol 100 mg daily and Entresto 1 tablet twice a day.  Will DC amlodipine and follow.  Avoid hypotension

## 2024-06-04 NOTE — ASSESSMENT & PLAN NOTE
History of depression.  Patient remains Vrylar 1.5 mg daily, bupropion 75 mg daily and Vilazdone 40 mg daily

## 2024-06-04 NOTE — ASSESSMENT & PLAN NOTE
Lab Results   Component Value Date    EGFR 32 05/15/2024    EGFR 33 05/14/2024    EGFR 28 05/13/2024    CREATININE 1.54 (H) 05/15/2024    CREATININE 1.50 (H) 05/14/2024    CREATININE 1.73 (H) 05/13/2024   Creatinine was stable at baseline on 5/16.  Avoid hypotension and nephrotoxins

## 2024-06-05 ENCOUNTER — NURSING HOME VISIT (OUTPATIENT)
Dept: GERIATRICS | Facility: OTHER | Age: 79
End: 2024-06-05
Payer: MEDICARE

## 2024-06-05 VITALS
BODY MASS INDEX: 19.05 KG/M2 | SYSTOLIC BLOOD PRESSURE: 116 MMHG | RESPIRATION RATE: 18 BRPM | HEART RATE: 79 BPM | WEIGHT: 121.6 LBS | DIASTOLIC BLOOD PRESSURE: 66 MMHG | OXYGEN SATURATION: 96 % | TEMPERATURE: 97.3 F

## 2024-06-05 DIAGNOSIS — R65.21 SEPTIC SHOCK (HCC): ICD-10-CM

## 2024-06-05 DIAGNOSIS — I25.118 CORONARY ARTERY DISEASE OF NATIVE ARTERY OF NATIVE HEART WITH STABLE ANGINA PECTORIS (HCC): ICD-10-CM

## 2024-06-05 DIAGNOSIS — J44.9 CHRONIC OBSTRUCTIVE PULMONARY DISEASE, UNSPECIFIED COPD TYPE (HCC): ICD-10-CM

## 2024-06-05 DIAGNOSIS — E44.0 MODERATE PROTEIN-CALORIE MALNUTRITION (HCC): ICD-10-CM

## 2024-06-05 DIAGNOSIS — N18.31 STAGE 3A CHRONIC KIDNEY DISEASE (HCC): ICD-10-CM

## 2024-06-05 DIAGNOSIS — A41.9 SEPTIC SHOCK (HCC): ICD-10-CM

## 2024-06-05 DIAGNOSIS — I10 ESSENTIAL HYPERTENSION: Primary | ICD-10-CM

## 2024-06-05 DIAGNOSIS — F33.1 MAJOR DEPRESSIVE DISORDER, RECURRENT EPISODE, MODERATE (HCC): ICD-10-CM

## 2024-06-05 DIAGNOSIS — I50.42 CHRONIC COMBINED SYSTOLIC AND DIASTOLIC CHF (CONGESTIVE HEART FAILURE) (HCC): ICD-10-CM

## 2024-06-05 DIAGNOSIS — C88.0 WALDENSTROM MACROGLOBULINEMIA (HCC): ICD-10-CM

## 2024-06-05 PROCEDURE — 99309 SBSQ NF CARE MODERATE MDM 30: CPT | Performed by: INTERNAL MEDICINE

## 2024-06-05 NOTE — ASSESSMENT & PLAN NOTE
Patient was taken off of amlodipine on last encounter due to low blood pressures.  Blood pressure is currently stable at 116/66.  Continue Entresto 1 tablet twice daily and metoprolol succinate 100 mg daily

## 2024-06-05 NOTE — ASSESSMENT & PLAN NOTE
Continue reassurance and supportive measures  Continue Vraylar, Viibryd, and Wellbutrin  Encourage activity and engagement  Will consult psychiatric services prn  Continue to monitor for acute changes in condition

## 2024-06-05 NOTE — PROGRESS NOTES
[facility]  [address]   [address]  [place], [address] [phone]    Progress Note  Code SNF 31    Patient Location     [facility]    Reason for visit     F.u COPD,chronic respiratory failure, HTN, recent hospitalization for septic shock    Patient’s care was coordinated with nursing facility staff. Recent vitals, labs and updated medications were reviewed on [email or web address] system of facility.     Problem List Items Addressed This Visit          Cardiovascular and Mediastinum    Essential hypertension - Primary     Patient was taken off of amlodipine on last encounter due to low blood pressures.  Blood pressure is currently stable at 116/66.  Continue Entresto 1 tablet twice daily and metoprolol succinate 100 mg daily         Coronary artery disease of native artery of native heart with stable angina pectoris (HCC)      Patient has declined any surgical intervention or evaluation for CAD.  Continue conservative treatment with metoprolol as needed nitro, pravastatin and aspirin.         Chronic combined systolic and diastolic CHF (congestive heart failure) (HCC)     Wt Readings from Last 3 Encounters:   06/05/24 55.2 kg (121 lb 9.6 oz)   06/02/24 54.9 kg (121 lb)   05/22/24 57.6 kg (127 lb)       Clinically compensated.  No recent weight gain.  Patient is currently not on any maintenance diuretics                Respiratory    COPD (chronic obstructive pulmonary disease) (HCC)     Stable on current inhalers.  Continue Anoro Ellipta and DuoNebs 3 times daily            Genitourinary    CKD (chronic kidney disease)     Lab Results   Component Value Date    EGFR 32 05/15/2024    EGFR 33 05/14/2024    EGFR 28 05/13/2024    CREATININE 1.54 (H) 05/15/2024    CREATININE 1.50 (H) 05/14/2024    CREATININE 1.73 (H) 05/13/2024   Recent creatinine was stable at baseline.  Avoid hypotension and nephrotoxins            Behavioral Health    Major depressive disorder, recurrent  episode, moderate (HCC)     History of depression.  Patient remains Vrylar 1.5 mg daily, bupropion 75 mg daily and Vilazdone 40 mg daily            Blood    Waldenstrom macroglobulinemia (HCC)     History of Waldenström's macroglobulinemia.  Follow-up with heme-onc.  Patient remains on zanubrutinib            Surgery/Wound/Pain    Septic shock (HCC)     Patient was recently hospitalized with septic shock related to pneumonia/pyelonephritis as seen on CT imaging.  Blood cultures revealed growth of E. coli.  She was initially started on cefepime and vancomycin subsequently switched over to IV vancomycin.  Patient completed antibiotic course prior to discharge.  Urine strep antigen and antigens were negative.  Remains afebrile. WBC count was normal at 8.9 on 5/16            Other    Moderate protein-calorie malnutrition (HCC)     Malnutrition Findings:                      Pt is noted to be weak and frail with generalized muscle wasting. Encourage po intake. Monitor weights.  F.U with dietitian           BMI Findings:           Body mass index is 19.05 kg/m².                   HPI       Pt is being seen for a follow up visit.  She is doing ok at present. Comfortable in no distress.  No fever chills, dyspnea or wheezing.Pt is awake alert able to make her needs known.  She is progressing well with therapy.  Patient is compliant with care and meds.  She was recently taken off of amlodipine due to low blood pressures.  Repeat blood pressure readings have been stable.  No episodes of hypotension      Review of Systems   Constitutional:  Negative for chills and fever.   Respiratory:  Positive for shortness of breath (at times). Negative for cough, wheezing and stridor.    Cardiovascular:  Negative for chest pain and leg swelling.   Gastrointestinal:  Negative for abdominal distention, abdominal pain and vomiting.   Genitourinary:  Negative for dysuria and hematuria.   Musculoskeletal:  Positive for gait problem.    Neurological:  Negative for seizures.   Psychiatric/Behavioral:  Negative for agitation and behavioral problems.        Past Medical History:   Diagnosis Date    Acute congestive heart failure (HCC) 2021    Anxiety     Cardiac disease     Chest pain 2021    Chronic pain disorder     COPD (chronic obstructive pulmonary disease) ([facility])     COVID-19 02/15/2024    Depression     Heart disease     Hyperlipidemia     Hypertension     MI (myocardial infarction) ([facility])     MRSA (methicillin resistant Staphylococcus aureus)     Renal disorder     benign kidney tumor       Past Surgical History:   Procedure Laterality Date    APPENDECTOMY      CARDIAC CATHETERIZATION N/A 2024    Procedure: Cardiac catheterization;  Surgeon: Luke Malagon MD;  [facility]: [facility];  Service: Cardiology    CARDIAC CATHETERIZATION N/A 2024    Procedure: Cardiac Coronary Angiogram;  Surgeon: Luke Malagon MD;  [facility];  Service: Cardiology    CARDIAC CATHETERIZATION Left 2024    Procedure: Cardiac Left Heart Cath;  Surgeon: Luke Malagon MD;  [facility];  Service: Cardiology    ELBOW BURSA SURGERY Left 2018    D/T MRSA INFECTION    IR THORACENTESIS  2024    SPINAL FUSION      L7 L9       Social History     Tobacco Use   Smoking Status Former    Current packs/day: 0.00    Average packs/day: 1 pack/day for 60.0 years (60.0 ttl pk-yrs)    Types: Cigarettes    Start date:     Quit date:     Years since quittin.4   Smokeless Tobacco Never   Tobacco Comments    She has close to a 60 pack-year smoking history, most recently has cut down to 4-5 cigarettes daily       Family History   Problem Relation Age of Onset    Heart disease Mother     Cancer Father         Allergies   Allergen Reactions    Sulfa Antibiotics Anaphylaxis    Iron GI Intolerance    Niacin     Statins Other (See Comments)     cramps         Current Outpatient Medications:      aspirin (ECOTRIN LOW STRENGTH) 81 mg EC tablet, Take 1 tablet (81 mg total) by mouth daily for 15 days Do not start before April 29, 2024., Disp: 15 tablet, Rfl: 0    buPROPion (WELLBUTRIN) 75 mg tablet, Take 1 tablet (75 mg total) by mouth in the morning for 15 days Do not start before April 29, 2024., Disp: 15 tablet, Rfl: 0    Calcium 500 MG tablet, Take 1 tablet (500 mg total) by mouth 2 (two) times a day for 15 days Do not start before April 29, 2024., Disp: 30 tablet, Rfl: 0    cariprazine (Vraylar) 1.5 MG capsule, Take 1 capsule (1.5 mg total) by mouth daily for 15 days Do not start before April 29, 2024., Disp: 15 capsule, Rfl: 0    gabapentin (NEURONTIN) 100 mg capsule, Take 1 capsule (100 mg total) by mouth daily at bedtime for 15 days Do not start before April 29, 2024., Disp: 15 capsule, Rfl: 0    ipratropium (ATROVENT) 0.02 % nebulizer solution, Take 2.5 mL (0.5 mg total) by nebulization 3 (three) times a day for 15 days Do not start before April 29, 2024., Disp: 112.5 mL, Rfl: 0    metoprolol succinate (TOPROL-XL) 100 mg 24 hr tablet, Take 1 tablet (100 mg total) by mouth daily, Disp: , Rfl:     nitroglycerin (NITROSTAT) 0.4 mg SL tablet, Place 1 tablet (0.4 mg total) under the tongue every 5 (five) minutes as needed for chest pain for up to 15 days Do not start before April 29, 2024., Disp: 15 tablet, Rfl: 0    pantoprazole (PROTONIX) 40 mg tablet, Take 1 tablet (40 mg total) by mouth daily for 15 doses Do not start before April 29, 2024., Disp: 15 tablet, Rfl: 0    pravastatin (PRAVACHOL) 80 mg tablet, Take 1 tablet (80 mg total) by mouth every evening for 15 days Do not start before April 29, 2024., Disp: 15 tablet, Rfl: 0    sacubitril-valsartan (Entresto) 24-26 MG TABS, Take 1 tablet by mouth 2 (two) times a day for 30 doses Do not start before April 29, 2024., Disp: 30 tablet, Rfl: 0    umeclidinium-vilanterol (Anoro Ellipta) 62.5-25 mcg/actuation inhaler, Inhale 1 puff daily, Disp: , Rfl:      "vilazodone (VIIBRYD) 40 mg tablet, Take 1 tablet (40 mg total) by mouth daily with breakfast for 15 days Do not start before April 29, 2024., Disp: 15 tablet, Rfl: 0    Zanubrutinib 80 MG CAPS, Take 160 mg by mouth 2 (two) times a day, Disp: , Rfl:     Updated list was reviewed in [facility] system of facility.     Vitals:    06/05/24 1447   BP: 116/66   Pulse: 79   Resp: 18   Temp: (!) 97.3 °F (36.3 °C)   SpO2: 96%       Physical Exam  Constitutional:       General: She is not in acute distress.  HENT:      Head: Normocephalic and atraumatic.   Eyes:      General: No scleral icterus.  Cardiovascular:      Rate and Rhythm: Normal rate.   Pulmonary:      Breath sounds: Rales (few at bases) present. No wheezing or rhonchi.   Abdominal:      General: There is no distension.      Palpations: Abdomen is soft.      Tenderness: There is no abdominal tenderness. There is guarding.   Musculoskeletal:      Cervical back: Neck supple.      Right lower leg: No edema.      Left lower leg: No edema.   Skin:     Coloration: Skin is not jaundiced.   Neurological:      General: No focal deficit present.      Cranial Nerves: No cranial nerve deficit.   Psychiatric:         Mood and Affect: Mood normal.         Behavior: Behavior normal.       Diagnostic Data:    5/16  Creatinine 1.66  BUN 28, Na 141,, K 4.3  HGB 10.1, WBC 8.9,     Portions of the record may have been created with voice recognition software.  Occasional wrong word or \"sound a like\" substitutions may have occurred due to the inherent limitations of voice recognition software.  Read the chart carefully and recognize, using context, where substitutions have occurred.    This note was electronically signed by Dr. Annamarie Muhammad   "

## 2024-06-05 NOTE — ASSESSMENT & PLAN NOTE
Lab Results   Component Value Date    EGFR 32 05/15/2024    EGFR 33 05/14/2024    EGFR 28 05/13/2024    CREATININE 1.54 (H) 05/15/2024    CREATININE 1.50 (H) 05/14/2024    CREATININE 1.73 (H) 05/13/2024   Last Creatinine 1.66 /Gfr 32, stable  Will continue to monitor BMP periodically  Avoid nephrotoxins and NSAIDS  Avoid hypotension  Continue to monitor for acute changes in condition

## 2024-06-05 NOTE — ASSESSMENT & PLAN NOTE
Current wt 120.4, remains stable  EF 35%, patient currently has Life Vest in place   Will check BMP periodically  Continue Entresto 24-26 BID  Continue MAI diet  Patient will be seen by Cardiology rounds during [facility] stay

## 2024-06-05 NOTE — ASSESSMENT & PLAN NOTE
Recent septic shock secondary to PNA/pyelonephritis as seen on CT imaging  BC positive for E. Coli  Patient is s/p IV antibiotic Cefepime and Vancomycin  Continue to monitor for acute changes in condition

## 2024-06-05 NOTE — ASSESSMENT & PLAN NOTE
Malnutrition Findings:                      Pt is noted to be weak and frail with generalized muscle wasting. Encourage po intake. Monitor weights.  F.U with dietitian           BMI Findings:           Body mass index is 19.05 kg/m².

## 2024-06-05 NOTE — ASSESSMENT & PLAN NOTE
Patient has declined any surgical intervention or evaluation for CAD.  Continue conservative treatment with metoprolol as needed nitro, pravastatin and aspirin.

## 2024-06-05 NOTE — ASSESSMENT & PLAN NOTE
5/16 most recent Hgb 10.1/Hct 32  Will continue to monitor CBC periodically  Monitor for signs and symptoms of bleeding  Continue to monitor patient for acute changes in condition

## 2024-06-05 NOTE — ASSESSMENT & PLAN NOTE
BP today remains stable, 118/66  Continue Metoprolol Succ 100 mg QD  Avoid hypotension  Continue to monitor Bp

## 2024-06-05 NOTE — ASSESSMENT & PLAN NOTE
Secondary to COPD exacerbation and PNA now resolved  Patient received IV abx  Continue Ventolin, Advair, and Atrovent  Continue supplemental oxygen  Patient denies sob today  Patients lungs clear on assessment today  Sa02 94% on 4L via NC  CT of chest revealed persistent multifocal consolidations with recommended repeat CT in 3 mo.  Patient will be seen during Pulm rounds at [facility]

## 2024-06-05 NOTE — ASSESSMENT & PLAN NOTE
History of Waldenstrom macroglobulinemia  Continue Zanubrutinib, provided by family  Follow up with Heme/Onc

## 2024-06-05 NOTE — ASSESSMENT & PLAN NOTE
Wt Readings from Last 3 Encounters:   06/05/24 55.2 kg (121 lb 9.6 oz)   06/02/24 54.9 kg (121 lb)   05/22/24 57.6 kg (127 lb)       Clinically compensated.  No recent weight gain.  Patient is currently not on any maintenance diuretics

## 2024-06-05 NOTE — ASSESSMENT & PLAN NOTE
Hx of falls  Maintain fall and safety precautions  Encourage and remind of use of call bell and asst devices  Continue PT/OT services  Assist with transfers, mobility, and ADLs as needed  Continue to monitor for acute changes in condition

## 2024-06-05 NOTE — ASSESSMENT & PLAN NOTE
Maintain fall and safety precautions  Encourage and remind use of call bell and asst devices  Continue PT/OT services  Assist with transfers, mobility, and ADLs as needed  Continue to monitor for acute changes in condition

## 2024-06-05 NOTE — ASSESSMENT & PLAN NOTE
Lab Results   Component Value Date    EGFR 32 05/15/2024    EGFR 33 05/14/2024    EGFR 28 05/13/2024    CREATININE 1.54 (H) 05/15/2024    CREATININE 1.50 (H) 05/14/2024    CREATININE 1.73 (H) 05/13/2024   Recent creatinine was stable at baseline.  Avoid hypotension and nephrotoxins

## 2024-06-11 ENCOUNTER — NURSING HOME VISIT (OUTPATIENT)
Dept: GERIATRICS | Facility: OTHER | Age: 79
End: 2024-06-11
Payer: MEDICARE

## 2024-06-11 VITALS
DIASTOLIC BLOOD PRESSURE: 56 MMHG | WEIGHT: 121 LBS | TEMPERATURE: 97.5 F | BODY MASS INDEX: 18.95 KG/M2 | RESPIRATION RATE: 18 BRPM | HEART RATE: 78 BPM | OXYGEN SATURATION: 96 % | SYSTOLIC BLOOD PRESSURE: 116 MMHG

## 2024-06-11 DIAGNOSIS — I10 ESSENTIAL HYPERTENSION: Primary | ICD-10-CM

## 2024-06-11 DIAGNOSIS — E44.0 MODERATE PROTEIN-CALORIE MALNUTRITION (HCC): ICD-10-CM

## 2024-06-11 DIAGNOSIS — I25.118 CORONARY ARTERY DISEASE OF NATIVE ARTERY OF NATIVE HEART WITH STABLE ANGINA PECTORIS (HCC): ICD-10-CM

## 2024-06-11 DIAGNOSIS — A41.9 SEPTIC SHOCK (HCC): ICD-10-CM

## 2024-06-11 DIAGNOSIS — D64.9 ANEMIA, UNSPECIFIED TYPE: ICD-10-CM

## 2024-06-11 DIAGNOSIS — I50.42 CHRONIC COMBINED SYSTOLIC AND DIASTOLIC CHF (CONGESTIVE HEART FAILURE) (HCC): ICD-10-CM

## 2024-06-11 DIAGNOSIS — J44.9 CHRONIC OBSTRUCTIVE PULMONARY DISEASE, UNSPECIFIED COPD TYPE (HCC): ICD-10-CM

## 2024-06-11 DIAGNOSIS — R65.21 SEPTIC SHOCK (HCC): ICD-10-CM

## 2024-06-11 DIAGNOSIS — R26.2 AMBULATORY DYSFUNCTION: ICD-10-CM

## 2024-06-11 DIAGNOSIS — F33.1 MAJOR DEPRESSIVE DISORDER, RECURRENT EPISODE, MODERATE (HCC): ICD-10-CM

## 2024-06-11 DIAGNOSIS — N18.31 STAGE 3A CHRONIC KIDNEY DISEASE (HCC): ICD-10-CM

## 2024-06-11 PROCEDURE — 99316 NF DSCHRG MGMT 30 MIN+: CPT | Performed by: INTERNAL MEDICINE

## 2024-06-11 NOTE — ASSESSMENT & PLAN NOTE
Wt Readings from Last 3 Encounters:   06/11/24 54.9 kg (121 lb)   06/05/24 55.2 kg (121 lb 9.6 oz)   06/02/24 54.9 kg (121 lb)       Clinically euvolemic.  Recent cardiology follow-up notes were reviewed.  Echo revealed improvement in EF to 40%.  LifeVest was discontinued.  Clinically no signs of CHF at present.  Continue to monitor weights.  Patient is currently not on any maintenance diuretics

## 2024-06-11 NOTE — ASSESSMENT & PLAN NOTE
Patient has declined any surgical intervention or evaluation for CAD.  Continue conservative treatment with metoprolol succinate 100 mg daily, Entresto 1 tablet twice daily, as needed nitro, pravastatin and aspirin.

## 2024-06-11 NOTE — ASSESSMENT & PLAN NOTE
Lab Results   Component Value Date    EGFR 32 05/15/2024    EGFR 33 05/14/2024    EGFR 28 05/13/2024    CREATININE 1.54 (H) 05/15/2024    CREATININE 1.50 (H) 05/14/2024    CREATININE 1.73 (H) 05/13/2024       Creatinine was stable at 1.6 on 5/16/2024.  Avoid hypotension and nephrotoxins

## 2024-06-11 NOTE — ASSESSMENT & PLAN NOTE
Patient was recently hospitalized with septic shock related to pneumonia/pyelonephritis as seen on CT imaging.  Blood cultures revealed growth of E. coli.  She was initially started on cefepime and vancomycin subsequently switched over to IV vancomycin.  Patient completed antibiotic course prior to discharge.  Urine strep antigen and antigens were negative.  Stable off antibiotics at present

## 2024-06-11 NOTE — PROGRESS NOTES
[facility]  [address]   [address]  [place], [place], [address] [phone]  Nursing Home Discharge    Code SNF 31      Patient Location     [facility]      Patient’s care was coordinated with nursing facility staff. Recent vitals, labs and updated medications were reviewed in [facility] facility.      History of Present   Illness     Patient is a 78 y.o. female with past medical history significant for COPD on chronic oxygen therapy, depression, hyperlipidemia, MI, CAD, benign kidney tumor,Anxiety, CAD and CHF.      Patient has had several hospitalizations since earlier this year.  She was hospitalized in February with COVID infection.  CT chest revealed tree-in-bud opacities representing infection with some mild pulmonary edema.  Patient was also noted to have positive troponins with echo revealing reduced EF of 35%.  She was diagnosed with non-ST elevation MI, subsequently underwent cardiac cath revealing triple-vessel disease outpatient follow-up for CABG was recommended.  She was discharged to SNF with LifeVest given reduced EF.  Patient was discharged from rehab approximately in April/24  She was re hospitalized on 5/2/2024 with increased lethargy and fever. CT imaging revealed pyelonephritis/pneumonia.  Patient was diagnosed with septic shock related to pyelonephritis/pneumonia.  Blood cultures revealed pan sensitive E. coli.  Urine strep pneumo and Legionella antigens were negative.  Patient was initially started on cefepime and vancomycin and subsequently switched over to IV ceftriaxone.  Antibiotic course was completed in the hospital.  Hospital stay was also significant for DARLING with creatinine of 2.9.  Baseline creatinine is between 0.7-1.1.  Renal function improved with hydration.  Creatinine came down to 1.5 which was felt to be a new baseline for pt.  Patient was seen by PT OT services and subsequently discharged to [facility]  course:  Pt underwent PT/OT. Labs on 5/16 were stable with Creatinine of 1.6, HGB of 10.1.  Respiratory status remained stable.  Pt was evaluated by cardiology service on 5/22. Repeat echo revealed EF of 45%. Life vest was removed  Entresto and Toprol were continued. Pt opted for conservative treatment only.  Pt requested to be discharged on 6/11/24.  Recommend to f.u with PCP in 7-10 days      Problem List Items Addressed This Visit          Cardiovascular and Mediastinum    Essential hypertension - Primary     Blood pressure stable on Entresto 1 tablet twice daily and metoprolol succinate 100 mg daily         Coronary artery disease of native artery of native heart with stable angina pectoris (HCC)      Patient has declined any surgical intervention or evaluation for CAD.  Continue conservative treatment with metoprolol succinate 100 mg daily, Entresto 1 tablet twice daily, as needed nitro, pravastatin and aspirin.         Chronic combined systolic and diastolic CHF (congestive heart failure) (HCC)     Wt Readings from Last 3 Encounters:   06/11/24 54.9 kg (121 lb)   06/05/24 55.2 kg (121 lb 9.6 oz)   06/02/24 54.9 kg (121 lb)       Clinically euvolemic.  Recent cardiology follow-up notes were reviewed.  Echo revealed improvement in EF to 40%.  LifeVest was discontinued.  Clinically no signs of CHF at present.  Continue to monitor weights.  Patient is currently not on any maintenance diuretics                Respiratory    COPD (chronic obstructive pulmonary disease) (HCC)     Stable on current inhalers.  Continue Anoro Ellipta and DuoNebs 3 times daily            Genitourinary    CKD (chronic kidney disease)     Lab Results   Component Value Date    EGFR 32 05/15/2024    EGFR 33 05/14/2024    EGFR 28 05/13/2024    CREATININE 1.54 (H) 05/15/2024    CREATININE 1.50 (H) 05/14/2024    CREATININE 1.73 (H) 05/13/2024       Creatinine was stable at 1.6 on 5/16/2024.  Avoid hypotension and nephrotoxins             Behavioral Health    Major depressive disorder, recurrent episode, moderate (HCC)     History of depression.  Patient remains Vrylar 1.5 mg daily, bupropion 75 mg daily and Vilazdone 40 mg daily            Blood    Anemia     Hemoglobin was stable at 10.1 on 5/16/2024.  Continue to monitor periodically            Care Coordination    Ambulatory dysfunction     Continue PT OT as needed.  Encourage use of walker            Surgery/Wound/Pain    Septic shock (HCC)     Patient was recently hospitalized with septic shock related to pneumonia/pyelonephritis as seen on CT imaging.  Blood cultures revealed growth of E. coli.  She was initially started on cefepime and vancomycin subsequently switched over to IV vancomycin.  Patient completed antibiotic course prior to discharge.  Urine strep antigen and antigens were negative.  Stable off antibiotics at present            Other    Moderate protein-calorie malnutrition (HCC)     Malnutrition Findings:                      Pt is noted to be weak and frail with generalized muscle wasting. Encourage po intake. Monitor weights.  F.U with dietitian           BMI Findings:           Body mass index is 18.95 kg/m².                 Review of Systems   Constitutional:  Negative for chills and fever.   Respiratory:  Negative for shortness of breath and wheezing.    Cardiovascular:  Negative for chest pain.   Gastrointestinal:  Negative for abdominal distention, abdominal pain, diarrhea and vomiting.   Genitourinary:  Negative for flank pain and hematuria.   Musculoskeletal:  Positive for gait problem (Uses walker to ambulate). Negative for back pain.   Neurological:  Negative for seizures, syncope and weakness.   Psychiatric/Behavioral:  Negative for agitation and behavioral problems.         Past Surgical History:   Procedure Laterality Date    APPENDECTOMY      CARDIAC CATHETERIZATION N/A 2/21/2024    Procedure: Cardiac catheterization;  Surgeon: Luke Malagon MD;  Location: [facility]  CARDIAC CATH LAB;  Service: Cardiology    CARDIAC CATHETERIZATION N/A 2024    Procedure: Cardiac Coronary Angiogram;  Surgeon: Luke Malagon MD;  Location: [facility] CARDIAC CATH LAB;  Service: Cardiology    CARDIAC CATHETERIZATION Left 2024    Procedure: Cardiac Left Heart Cath;  Surgeon: Luke Malagon MD;  Location: [facility] CARDIAC CATH LAB;  Service: Cardiology    ELBOW BURSA SURGERY Left 2018    D/T MRSA INFECTION    IR THORACENTESIS  2024    SPINAL FUSION      L7 L9       Past Medical History:   Diagnosis Date    Acute congestive heart failure (HCC) 2021    Anxiety     Cardiac disease     Chest pain 2021    Chronic pain disorder     COPD (chronic obstructive pulmonary disease) ([facility])     COVID-19 02/15/2024    Depression     Heart disease     Hyperlipidemia     Hypertension     MI (myocardial infarction) ([facility])     MRSA (methicillin resistant Staphylococcus aureus)     Renal disorder     benign kidney tumor         Social History     Tobacco Use   Smoking Status Former    Current packs/day: 0.00    Average packs/day: 1 pack/day for 60.0 years (60.0 ttl pk-yrs)    Types: Cigarettes    Start date:     Quit date:     Years since quittin.4   Smokeless Tobacco Never   Tobacco Comments    She has close to a 60 pack-year smoking history, most recently has cut down to 4-5 cigarettes daily       Social History     Substance and Sexual Activity   Alcohol Use Never       Family History   Problem Relation Age of Onset    Heart disease Mother     Cancer Father        Allergies   Allergen Reactions    Sulfa Antibiotics Anaphylaxis    Iron GI Intolerance    Niacin     Statins Other (See Comments)     cramps          Current Outpatient Medications:     aspirin (ECOTRIN LOW STRENGTH) 81 mg EC tablet, Take 1 tablet (81 mg total) by mouth daily for 15 days Do not start before 2024., Disp: 15 tablet, Rfl: 0    buPROPion (WELLBUTRIN) 75 mg tablet, Take 1 tablet (75 mg total) by mouth  in the morning for 15 days Do not start before April 29, 2024., Disp: 15 tablet, Rfl: 0    Calcium 500 MG tablet, Take 1 tablet (500 mg total) by mouth 2 (two) times a day for 15 days Do not start before April 29, 2024., Disp: 30 tablet, Rfl: 0    cariprazine (Vraylar) 1.5 MG capsule, Take 1 capsule (1.5 mg total) by mouth daily for 15 days Do not start before April 29, 2024., Disp: 15 capsule, Rfl: 0    gabapentin (NEURONTIN) 100 mg capsule, Take 1 capsule (100 mg total) by mouth daily at bedtime for 15 days Do not start before April 29, 2024., Disp: 15 capsule, Rfl: 0    ipratropium (ATROVENT) 0.02 % nebulizer solution, Take 2.5 mL (0.5 mg total) by nebulization 3 (three) times a day for 15 days Do not start before April 29, 2024., Disp: 112.5 mL, Rfl: 0    metoprolol succinate (TOPROL-XL) 100 mg 24 hr tablet, Take 1 tablet (100 mg total) by mouth daily, Disp: , Rfl:     nitroglycerin (NITROSTAT) 0.4 mg SL tablet, Place 1 tablet (0.4 mg total) under the tongue every 5 (five) minutes as needed for chest pain for up to 15 days Do not start before April 29, 2024., Disp: 15 tablet, Rfl: 0    pantoprazole (PROTONIX) 40 mg tablet, Take 1 tablet (40 mg total) by mouth daily for 15 doses Do not start before April 29, 2024., Disp: 15 tablet, Rfl: 0    pravastatin (PRAVACHOL) 80 mg tablet, Take 1 tablet (80 mg total) by mouth every evening for 15 days Do not start before April 29, 2024., Disp: 15 tablet, Rfl: 0    sacubitril-valsartan (Entresto) 24-26 MG TABS, Take 1 tablet by mouth 2 (two) times a day for 30 doses Do not start before April 29, 2024., Disp: 30 tablet, Rfl: 0    umeclidinium-vilanterol (Anoro Ellipta) 62.5-25 mcg/actuation inhaler, Inhale 1 puff daily, Disp: , Rfl:     vilazodone (VIIBRYD) 40 mg tablet, Take 1 tablet (40 mg total) by mouth daily with breakfast for 15 days Do not start before April 29, 2024., Disp: 15 tablet, Rfl: 0    Zanubrutinib 80 MG CAPS, Take 160 mg by mouth 2 (two) times a day, Disp: ,  "Rfl:     [facility]       Vitals:    06/11/24 1013   BP: 116/56   Pulse: 78   Resp: 18   Temp: 97.5 °F (36.4 °C)   SpO2: 96%         Physical Exam  Constitutional:       General: She is not in acute distress.     Comments: Thin appearing   HENT:      Head: Normocephalic and atraumatic.   Eyes:      General: No scleral icterus.  Cardiovascular:      Rate and Rhythm: Normal rate and regular rhythm.   Pulmonary:      Breath sounds: No wheezing, rhonchi or rales.      Comments: Pt on chronic O2  Abdominal:      General: There is no distension.      Palpations: Abdomen is soft.      Tenderness: There is no abdominal tenderness. There is no guarding.   Musculoskeletal:      Cervical back: Neck supple.      Right lower leg: No edema.      Left lower leg: No edema.   Skin:     Coloration: Skin is not jaundiced.   Neurological:      General: No focal deficit present.      Cranial Nerves: No cranial nerve deficit.      Motor: Weakness present.      Comments: Awake alert able to make her needs known   Psychiatric:         Mood and Affect: Mood normal.         Behavior: Behavior normal.         Diagnostic Data     Labs from 5/16 were reviewed      Follow-up Instructions     F.U with PCP in 7-10 days    Care coordinated with patient's son over the phone.  Son recently filled patient's prescriptions from the pharmacy.  Does not need any medications at present      Portions of the record may have been created with voice recognition software.  Occasional wrong word or \"sound a like\" substitutions may have occurred due to the inherent limitations of voice recognition software.  Read the chart carefully and recognize, using context, where substitutions have occurred.  This note was electronically signed by Dr. Annamarie Muhammad  "

## 2024-06-14 ENCOUNTER — HOSPITAL ENCOUNTER (INPATIENT)
Facility: HOSPITAL | Age: 79
LOS: 7 days | Discharge: HOME HEALTH | DRG: 872 | End: 2024-06-21
Attending: EMERGENCY MEDICINE | Admitting: STUDENT IN AN ORGANIZED HEALTH CARE EDUCATION/TRAINING PROGRAM
Payer: MEDICARE

## 2024-06-14 ENCOUNTER — APPOINTMENT (EMERGENCY)
Dept: RADIOLOGY | Facility: HOSPITAL | Age: 79
DRG: 872 | End: 2024-06-14
Payer: MEDICARE

## 2024-06-14 DIAGNOSIS — N39.0 UTI (URINARY TRACT INFECTION): Primary | ICD-10-CM

## 2024-06-14 DIAGNOSIS — A41.9 SEPSIS (HCC): ICD-10-CM

## 2024-06-14 LAB
ALBUMIN SERPL BCG-MCNC: 3.6 G/DL (ref 3.5–5)
ALP SERPL-CCNC: 88 U/L (ref 34–104)
ALT SERPL W P-5'-P-CCNC: 3 U/L (ref 7–52)
ANION GAP SERPL CALCULATED.3IONS-SCNC: 6 MMOL/L (ref 4–13)
APTT PPP: 44 SECONDS (ref 23–37)
AST SERPL W P-5'-P-CCNC: 10 U/L (ref 13–39)
BACTERIA UR QL AUTO: ABNORMAL /HPF
BASOPHILS # BLD AUTO: 0.04 THOUSANDS/ÂΜL (ref 0–0.1)
BASOPHILS NFR BLD AUTO: 0 % (ref 0–1)
BILIRUB SERPL-MCNC: 0.41 MG/DL (ref 0.2–1)
BILIRUB UR QL STRIP: NEGATIVE
BUN SERPL-MCNC: 40 MG/DL (ref 5–25)
CALCIUM SERPL-MCNC: 9.4 MG/DL (ref 8.4–10.2)
CHLORIDE SERPL-SCNC: 107 MMOL/L (ref 96–108)
CLARITY UR: ABNORMAL
CO2 SERPL-SCNC: 26 MMOL/L (ref 21–32)
COLOR UR: ABNORMAL
CREAT SERPL-MCNC: 1.87 MG/DL (ref 0.6–1.3)
EOSINOPHIL # BLD AUTO: 0.07 THOUSAND/ÂΜL (ref 0–0.61)
EOSINOPHIL NFR BLD AUTO: 1 % (ref 0–6)
ERYTHROCYTE [DISTWIDTH] IN BLOOD BY AUTOMATED COUNT: 16 % (ref 11.6–15.1)
FLUAV RNA RESP QL NAA+PROBE: NEGATIVE
FLUBV RNA RESP QL NAA+PROBE: NEGATIVE
GFR SERPL CREATININE-BSD FRML MDRD: 25 ML/MIN/1.73SQ M
GLUCOSE SERPL-MCNC: 114 MG/DL (ref 65–140)
GLUCOSE UR STRIP-MCNC: NEGATIVE MG/DL
HCT VFR BLD AUTO: 27.4 % (ref 34.8–46.1)
HGB BLD-MCNC: 8.4 G/DL (ref 11.5–15.4)
HGB UR QL STRIP.AUTO: ABNORMAL
IMM GRANULOCYTES # BLD AUTO: 0.05 THOUSAND/UL (ref 0–0.2)
IMM GRANULOCYTES NFR BLD AUTO: 0 % (ref 0–2)
INR PPP: 1.03 (ref 0.84–1.19)
KETONES UR STRIP-MCNC: NEGATIVE MG/DL
LACTATE SERPL-SCNC: 0.9 MMOL/L (ref 0.5–2)
LEUKOCYTE ESTERASE UR QL STRIP: ABNORMAL
LYMPHOCYTES # BLD AUTO: 1.37 THOUSANDS/ÂΜL (ref 0.6–4.47)
LYMPHOCYTES NFR BLD AUTO: 11 % (ref 14–44)
MCH RBC QN AUTO: 29 PG (ref 26.8–34.3)
MCHC RBC AUTO-ENTMCNC: 30.7 G/DL (ref 31.4–37.4)
MCV RBC AUTO: 95 FL (ref 82–98)
MONOCYTES # BLD AUTO: 0.77 THOUSAND/ÂΜL (ref 0.17–1.22)
MONOCYTES NFR BLD AUTO: 6 % (ref 4–12)
NEUTROPHILS # BLD AUTO: 10.44 THOUSANDS/ÂΜL (ref 1.85–7.62)
NEUTS SEG NFR BLD AUTO: 82 % (ref 43–75)
NITRITE UR QL STRIP: POSITIVE
NON-SQ EPI CELLS URNS QL MICRO: ABNORMAL /HPF
NRBC BLD AUTO-RTO: 0 /100 WBCS
PH UR STRIP.AUTO: 5.5 [PH]
PLATELET # BLD AUTO: 200 THOUSANDS/UL (ref 149–390)
PMV BLD AUTO: 10.1 FL (ref 8.9–12.7)
POTASSIUM SERPL-SCNC: 4.5 MMOL/L (ref 3.5–5.3)
PROCALCITONIN SERPL-MCNC: 0.13 NG/ML
PROT SERPL-MCNC: 8.9 G/DL (ref 6.4–8.4)
PROT UR STRIP-MCNC: ABNORMAL MG/DL
PROTHROMBIN TIME: 14.1 SECONDS (ref 11.6–14.5)
RBC # BLD AUTO: 2.9 MILLION/UL (ref 3.81–5.12)
RBC #/AREA URNS AUTO: ABNORMAL /HPF
RSV RNA RESP QL NAA+PROBE: NEGATIVE
SARS-COV-2 RNA RESP QL NAA+PROBE: NEGATIVE
SODIUM SERPL-SCNC: 139 MMOL/L (ref 135–147)
SP GR UR STRIP.AUTO: 1.01 (ref 1–1.03)
UROBILINOGEN UR STRIP-ACNC: <2 MG/DL
WBC # BLD AUTO: 12.74 THOUSAND/UL (ref 4.31–10.16)
WBC #/AREA URNS AUTO: ABNORMAL /HPF
WBC CLUMPS # UR AUTO: PRESENT /UL

## 2024-06-14 PROCEDURE — 80053 COMPREHEN METABOLIC PANEL: CPT | Performed by: PHYSICIAN ASSISTANT

## 2024-06-14 PROCEDURE — 87040 BLOOD CULTURE FOR BACTERIA: CPT | Performed by: PHYSICIAN ASSISTANT

## 2024-06-14 PROCEDURE — 99223 1ST HOSP IP/OBS HIGH 75: CPT | Performed by: STUDENT IN AN ORGANIZED HEALTH CARE EDUCATION/TRAINING PROGRAM

## 2024-06-14 PROCEDURE — 85730 THROMBOPLASTIN TIME PARTIAL: CPT | Performed by: PHYSICIAN ASSISTANT

## 2024-06-14 PROCEDURE — 93005 ELECTROCARDIOGRAM TRACING: CPT

## 2024-06-14 PROCEDURE — 96368 THER/DIAG CONCURRENT INF: CPT

## 2024-06-14 PROCEDURE — 96365 THER/PROPH/DIAG IV INF INIT: CPT

## 2024-06-14 PROCEDURE — 85025 COMPLETE CBC W/AUTO DIFF WBC: CPT | Performed by: PHYSICIAN ASSISTANT

## 2024-06-14 PROCEDURE — 85610 PROTHROMBIN TIME: CPT | Performed by: PHYSICIAN ASSISTANT

## 2024-06-14 PROCEDURE — 71046 X-RAY EXAM CHEST 2 VIEWS: CPT

## 2024-06-14 PROCEDURE — 87186 SC STD MICRODIL/AGAR DIL: CPT | Performed by: PHYSICIAN ASSISTANT

## 2024-06-14 PROCEDURE — 99285 EMERGENCY DEPT VISIT HI MDM: CPT

## 2024-06-14 PROCEDURE — 81001 URINALYSIS AUTO W/SCOPE: CPT | Performed by: PHYSICIAN ASSISTANT

## 2024-06-14 PROCEDURE — 36415 COLL VENOUS BLD VENIPUNCTURE: CPT | Performed by: PHYSICIAN ASSISTANT

## 2024-06-14 PROCEDURE — 87077 CULTURE AEROBIC IDENTIFY: CPT | Performed by: PHYSICIAN ASSISTANT

## 2024-06-14 PROCEDURE — 87086 URINE CULTURE/COLONY COUNT: CPT | Performed by: PHYSICIAN ASSISTANT

## 2024-06-14 PROCEDURE — 0241U HB NFCT DS VIR RESP RNA 4 TRGT: CPT | Performed by: PHYSICIAN ASSISTANT

## 2024-06-14 PROCEDURE — 83605 ASSAY OF LACTIC ACID: CPT | Performed by: PHYSICIAN ASSISTANT

## 2024-06-14 PROCEDURE — 87154 CUL TYP ID BLD PTHGN 6+ TRGT: CPT | Performed by: PHYSICIAN ASSISTANT

## 2024-06-14 PROCEDURE — 84145 PROCALCITONIN (PCT): CPT | Performed by: PHYSICIAN ASSISTANT

## 2024-06-14 PROCEDURE — 96367 TX/PROPH/DG ADDL SEQ IV INF: CPT

## 2024-06-14 RX ORDER — HEPARIN SODIUM 5000 [USP'U]/ML
5000 INJECTION, SOLUTION INTRAVENOUS; SUBCUTANEOUS EVERY 8 HOURS SCHEDULED
Status: DISCONTINUED | OUTPATIENT
Start: 2024-06-14 | End: 2024-06-18

## 2024-06-14 RX ORDER — VANCOMYCIN HYDROCHLORIDE 750 MG/150ML
15 INJECTION, SOLUTION INTRAVENOUS ONCE
Status: DISCONTINUED | OUTPATIENT
Start: 2024-06-14 | End: 2024-06-14 | Stop reason: DRUGHIGH

## 2024-06-14 RX ORDER — PRAVASTATIN SODIUM 80 MG/1
80 TABLET ORAL EVERY EVENING
Status: DISCONTINUED | OUTPATIENT
Start: 2024-06-14 | End: 2024-06-21 | Stop reason: HOSPADM

## 2024-06-14 RX ORDER — GABAPENTIN 100 MG/1
100 CAPSULE ORAL
Status: DISCONTINUED | OUTPATIENT
Start: 2024-06-14 | End: 2024-06-21 | Stop reason: HOSPADM

## 2024-06-14 RX ORDER — METOPROLOL SUCCINATE 100 MG/1
100 TABLET, EXTENDED RELEASE ORAL DAILY
Status: DISCONTINUED | OUTPATIENT
Start: 2024-06-15 | End: 2024-06-15

## 2024-06-14 RX ORDER — PANTOPRAZOLE SODIUM 40 MG/1
40 TABLET, DELAYED RELEASE ORAL DAILY
Status: DISCONTINUED | OUTPATIENT
Start: 2024-06-15 | End: 2024-06-21 | Stop reason: HOSPADM

## 2024-06-14 RX ORDER — BUPROPION HYDROCHLORIDE 75 MG/1
75 TABLET ORAL DAILY
Status: DISCONTINUED | OUTPATIENT
Start: 2024-06-15 | End: 2024-06-21 | Stop reason: HOSPADM

## 2024-06-14 RX ORDER — VILAZODONE HYDROCHLORIDE 20 MG/1
40 TABLET ORAL
Status: DISCONTINUED | OUTPATIENT
Start: 2024-06-15 | End: 2024-06-21 | Stop reason: HOSPADM

## 2024-06-14 RX ADMIN — VANCOMYCIN HYDROCHLORIDE 1250 MG: 5 INJECTION, POWDER, LYOPHILIZED, FOR SOLUTION INTRAVENOUS at 21:02

## 2024-06-14 RX ADMIN — SODIUM CHLORIDE 1000 ML: 0.9 INJECTION, SOLUTION INTRAVENOUS at 20:09

## 2024-06-14 RX ADMIN — CEFEPIME 2000 MG: 2 INJECTION, POWDER, FOR SOLUTION INTRAVENOUS at 20:11

## 2024-06-14 RX ADMIN — Medication 820 MG: at 20:39

## 2024-06-15 LAB
ANION GAP SERPL CALCULATED.3IONS-SCNC: 5 MMOL/L (ref 4–13)
BUN SERPL-MCNC: 34 MG/DL (ref 5–25)
CALCIUM SERPL-MCNC: 8.8 MG/DL (ref 8.4–10.2)
CHLORIDE SERPL-SCNC: 110 MMOL/L (ref 96–108)
CO2 SERPL-SCNC: 23 MMOL/L (ref 21–32)
CREAT SERPL-MCNC: 1.56 MG/DL (ref 0.6–1.3)
ERYTHROCYTE [DISTWIDTH] IN BLOOD BY AUTOMATED COUNT: 16.4 % (ref 11.6–15.1)
GFR SERPL CREATININE-BSD FRML MDRD: 31 ML/MIN/1.73SQ M
GLUCOSE SERPL-MCNC: 103 MG/DL (ref 65–140)
HCT VFR BLD AUTO: 26.1 % (ref 34.8–46.1)
HGB BLD-MCNC: 7.8 G/DL (ref 11.5–15.4)
MCH RBC QN AUTO: 28.6 PG (ref 26.8–34.3)
MCHC RBC AUTO-ENTMCNC: 29.9 G/DL (ref 31.4–37.4)
MCV RBC AUTO: 96 FL (ref 82–98)
PLATELET # BLD AUTO: 152 THOUSANDS/UL (ref 149–390)
PMV BLD AUTO: 10.6 FL (ref 8.9–12.7)
POTASSIUM SERPL-SCNC: 4 MMOL/L (ref 3.5–5.3)
PROCALCITONIN SERPL-MCNC: 0.5 NG/ML
RBC # BLD AUTO: 2.73 MILLION/UL (ref 3.81–5.12)
SODIUM SERPL-SCNC: 138 MMOL/L (ref 135–147)
WBC # BLD AUTO: 11.47 THOUSAND/UL (ref 4.31–10.16)

## 2024-06-15 PROCEDURE — 85027 COMPLETE CBC AUTOMATED: CPT | Performed by: STUDENT IN AN ORGANIZED HEALTH CARE EDUCATION/TRAINING PROGRAM

## 2024-06-15 PROCEDURE — 94760 N-INVAS EAR/PLS OXIMETRY 1: CPT

## 2024-06-15 PROCEDURE — 80048 BASIC METABOLIC PNL TOTAL CA: CPT | Performed by: STUDENT IN AN ORGANIZED HEALTH CARE EDUCATION/TRAINING PROGRAM

## 2024-06-15 PROCEDURE — 94640 AIRWAY INHALATION TREATMENT: CPT

## 2024-06-15 PROCEDURE — 99232 SBSQ HOSP IP/OBS MODERATE 35: CPT | Performed by: INTERNAL MEDICINE

## 2024-06-15 PROCEDURE — 84145 PROCALCITONIN (PCT): CPT | Performed by: STUDENT IN AN ORGANIZED HEALTH CARE EDUCATION/TRAINING PROGRAM

## 2024-06-15 PROCEDURE — 97163 PT EVAL HIGH COMPLEX 45 MIN: CPT

## 2024-06-15 RX ORDER — SODIUM CHLORIDE 9 MG/ML
75 INJECTION, SOLUTION INTRAVENOUS CONTINUOUS
Status: DISCONTINUED | OUTPATIENT
Start: 2024-06-15 | End: 2024-06-15

## 2024-06-15 RX ORDER — ALBUTEROL SULFATE 2.5 MG/3ML
2.5 SOLUTION RESPIRATORY (INHALATION) EVERY 6 HOURS PRN
Status: DISCONTINUED | OUTPATIENT
Start: 2024-06-15 | End: 2024-06-21 | Stop reason: HOSPADM

## 2024-06-15 RX ORDER — ACETAMINOPHEN 325 MG/1
650 TABLET ORAL EVERY 6 HOURS PRN
Status: DISCONTINUED | OUTPATIENT
Start: 2024-06-15 | End: 2024-06-21 | Stop reason: HOSPADM

## 2024-06-15 RX ORDER — OXYCODONE HYDROCHLORIDE 5 MG/1
5 TABLET ORAL EVERY 6 HOURS PRN
Status: DISCONTINUED | OUTPATIENT
Start: 2024-06-15 | End: 2024-06-21 | Stop reason: HOSPADM

## 2024-06-15 RX ADMIN — MORPHINE SULFATE 2 MG: 2 INJECTION, SOLUTION INTRAMUSCULAR; INTRAVENOUS at 22:07

## 2024-06-15 RX ADMIN — GABAPENTIN 100 MG: 100 CAPSULE ORAL at 22:07

## 2024-06-15 RX ADMIN — GABAPENTIN 100 MG: 100 CAPSULE ORAL at 00:09

## 2024-06-15 RX ADMIN — HEPARIN SODIUM 5000 UNITS: 5000 INJECTION INTRAVENOUS; SUBCUTANEOUS at 22:09

## 2024-06-15 RX ADMIN — BUPROPION HYDROCHLORIDE 75 MG: 75 TABLET, FILM COATED ORAL at 08:34

## 2024-06-15 RX ADMIN — SODIUM CHLORIDE 75 ML/HR: 0.9 INJECTION, SOLUTION INTRAVENOUS at 00:09

## 2024-06-15 RX ADMIN — PRAVASTATIN SODIUM 80 MG: 80 TABLET ORAL at 00:09

## 2024-06-15 RX ADMIN — CEFTRIAXONE SODIUM 1000 MG: 10 INJECTION, POWDER, FOR SOLUTION INTRAVENOUS at 08:37

## 2024-06-15 RX ADMIN — IPRATROPIUM BROMIDE 0.5 MG: 0.5 SOLUTION RESPIRATORY (INHALATION) at 14:10

## 2024-06-15 RX ADMIN — PANTOPRAZOLE SODIUM 40 MG: 40 TABLET, DELAYED RELEASE ORAL at 08:34

## 2024-06-15 RX ADMIN — ZANUBRUTINIB 160 MG: 80 CAPSULE, GELATIN COATED ORAL at 14:23

## 2024-06-15 RX ADMIN — HEPARIN SODIUM 5000 UNITS: 5000 INJECTION INTRAVENOUS; SUBCUTANEOUS at 00:09

## 2024-06-15 RX ADMIN — IPRATROPIUM BROMIDE 0.5 MG: 0.5 SOLUTION RESPIRATORY (INHALATION) at 07:15

## 2024-06-15 RX ADMIN — IPRATROPIUM BROMIDE 0.5 MG: 0.5 SOLUTION RESPIRATORY (INHALATION) at 19:46

## 2024-06-15 RX ADMIN — ASPIRIN 81 MG: 81 TABLET, COATED ORAL at 08:34

## 2024-06-15 RX ADMIN — HEPARIN SODIUM 5000 UNITS: 5000 INJECTION INTRAVENOUS; SUBCUTANEOUS at 13:55

## 2024-06-15 RX ADMIN — ZANUBRUTINIB 160 MG: 80 CAPSULE, GELATIN COATED ORAL at 22:07

## 2024-06-15 RX ADMIN — VILAZODONE HYDROCHLORIDE 40 MG: 20 TABLET ORAL at 08:34

## 2024-06-15 RX ADMIN — PRAVASTATIN SODIUM 80 MG: 80 TABLET ORAL at 17:14

## 2024-06-15 NOTE — ED PROVIDER NOTES
History  Chief Complaint   Patient presents with    Failure To Thrive     Patient arrives to the ER from home via EMS with complaints of failure to thrive. Per family pt was hospitalized recently for sepsis (e-coli). GCS 14. 3lnc oxygen at home. Pt not eating/drinking and unable to ambulate x 2 days.     77 yo with fever. Admitted last month for E coli bacteremia 2/2 UTI. Has been home fm rehab for two days during which time she's been weak, tired, eating less than usual. Having chills. No n/v. No chest pain or sob.       History provided by:  Patient   used: No        Prior to Admission Medications   Prescriptions Last Dose Informant Patient Reported? Taking?   Calcium 500 MG tablet   No No   Sig: Take 1 tablet (500 mg total) by mouth 2 (two) times a day for 15 days Do not start before April 29, 2024.   Zanubrutinib 80 MG CAPS   Yes No   Sig: Take 160 mg by mouth 2 (two) times a day   aspirin (ECOTRIN LOW STRENGTH) 81 mg EC tablet   No No   Sig: Take 1 tablet (81 mg total) by mouth daily for 15 days Do not start before April 29, 2024.   buPROPion (WELLBUTRIN) 75 mg tablet   No No   Sig: Take 1 tablet (75 mg total) by mouth in the morning for 15 days Do not start before April 29, 2024.   cariprazine (Vraylar) 1.5 MG capsule   No No   Sig: Take 1 capsule (1.5 mg total) by mouth daily for 15 days Do not start before April 29, 2024.   gabapentin (NEURONTIN) 100 mg capsule   No No   Sig: Take 1 capsule (100 mg total) by mouth daily at bedtime for 15 days Do not start before April 29, 2024.   ipratropium (ATROVENT) 0.02 % nebulizer solution   No No   Sig: Take 2.5 mL (0.5 mg total) by nebulization 3 (three) times a day for 15 days Do not start before April 29, 2024.   metoprolol succinate (TOPROL-XL) 100 mg 24 hr tablet   No No   Sig: Take 1 tablet (100 mg total) by mouth daily   nitroglycerin (NITROSTAT) 0.4 mg SL tablet   No No   Sig: Place 1 tablet (0.4 mg total) under the tongue every 5 (five)  minutes as needed for chest pain for up to 15 days Do not start before April 29, 2024.   pantoprazole (PROTONIX) 40 mg tablet   No No   Sig: Take 1 tablet (40 mg total) by mouth daily for 15 doses Do not start before April 29, 2024.   pravastatin (PRAVACHOL) 80 mg tablet   No No   Sig: Take 1 tablet (80 mg total) by mouth every evening for 15 days Do not start before April 29, 2024.   sacubitril-valsartan (Entresto) 24-26 MG TABS   No No   Sig: Take 1 tablet by mouth 2 (two) times a day for 30 doses Do not start before April 29, 2024.   umeclidinium-vilanterol (Anoro Ellipta) 62.5-25 mcg/actuation inhaler   No No   Sig: Inhale 1 puff daily   vilazodone (VIIBRYD) 40 mg tablet   No No   Sig: Take 1 tablet (40 mg total) by mouth daily with breakfast for 15 days Do not start before April 29, 2024.      Facility-Administered Medications: None       Past Medical History:   Diagnosis Date    Acute congestive heart failure (HCC) 08/27/2021    Anxiety     Cardiac disease     Chest pain 08/27/2021    Chronic pain disorder     COPD (chronic obstructive pulmonary disease) ([facility])     COVID-19 02/15/2024    Depression     Heart disease     Hyperlipidemia     Hypertension     MI (myocardial infarction) ([facility])     MRSA (methicillin resistant Staphylococcus aureus)     Renal disorder     benign kidney tumor       Past Surgical History:   Procedure Laterality Date    APPENDECTOMY      CARDIAC CATHETERIZATION N/A 2/21/2024    Procedure: Cardiac catheterization;  Surgeon: Luke Malagon MD;  Location: [facility];  Service: Cardiology    CARDIAC CATHETERIZATION N/A 2/21/2024    Procedure: Cardiac Coronary Angiogram;  Surgeon: Luke Malagon MD;  Location: [facility];  Service: Cardiology    CARDIAC CATHETERIZATION Left 2/21/2024    Procedure: Cardiac Left Heart Cath;  Surgeon: Luke Malagon MD;  Location: [facility];  Service: Cardiology    ELBOW BURSA SURGERY Left 02/2018    D/T MRSA INFECTION     IR THORACENTESIS  2024    SPINAL FUSION      L7 L9       Family History   Problem Relation Age of Onset    Heart disease Mother     Cancer Father      I have reviewed and agree with the history as documented.    E-Cigarette/Vaping    E-Cigarette Use Never User      E-Cigarette/Vaping Substances    Nicotine No     THC No     CBD No     Flavoring No     Other No     Unknown No      Social History     Tobacco Use    Smoking status: Former     Current packs/day: 0.00     Average packs/day: 1 pack/day for 60.0 years (60.0 ttl pk-yrs)     Types: Cigarettes     Start date:      Quit date:      Years since quittin.4    Smokeless tobacco: Never    Tobacco comments:     She has close to a 60 pack-year smoking history, most recently has cut down to 4-5 cigarettes daily   Vaping Use    Vaping status: Never Used   Substance Use Topics    Alcohol use: Never    Drug use: No       Review of Systems   Constitutional:  Positive for appetite change, chills, fatigue and fever. Negative for diaphoresis.   HENT:  Negative for ear pain and sore throat.    Eyes:  Negative for pain and visual disturbance.   Respiratory:  Negative for cough and shortness of breath.    Cardiovascular:  Negative for chest pain and palpitations.   Gastrointestinal:  Negative for abdominal pain and vomiting.   Genitourinary:  Negative for dysuria and hematuria.   Musculoskeletal:  Negative for arthralgias and back pain.   Skin:  Negative for color change and rash.   Neurological:  Negative for seizures and syncope.   All other systems reviewed and are negative.      Physical Exam  Physical Exam  Vitals and nursing note reviewed.   Constitutional:       General: She is not in acute distress.     Appearance: She is well-developed. She is ill-appearing.      Comments: Rigors     HENT:      Head: Normocephalic and atraumatic.   Eyes:      Conjunctiva/sclera: Conjunctivae normal.   Cardiovascular:      Rate and Rhythm: Normal rate and regular rhythm.       Heart sounds: No murmur heard.  Pulmonary:      Effort: Pulmonary effort is normal. No respiratory distress.      Breath sounds: Normal breath sounds.   Abdominal:      Palpations: Abdomen is soft.      Tenderness: There is no abdominal tenderness.   Musculoskeletal:         General: No swelling.      Cervical back: Neck supple.   Skin:     General: Skin is warm and dry.      Capillary Refill: Capillary refill takes less than 2 seconds.   Neurological:      Mental Status: She is alert.   Psychiatric:         Mood and Affect: Mood normal.         Vital Signs  ED Triage Vitals   Temperature Pulse Respirations Blood Pressure SpO2   06/14/24 1945 06/14/24 1943 06/14/24 1943 06/14/24 1943 06/14/24 1943   (!) 101.1 °F (38.4 °C) 104 21 167/85 95 %      Temp src Heart Rate Source Patient Position - Orthostatic VS BP Location FiO2 (%)   -- -- -- -- --             Pain Score       --                  Vitals:    06/14/24 1943   BP: 167/85   Pulse: 104         Visual Acuity      ED Medications  Medications   vancomycin (VANCOCIN) IVPB (premix in dextrose) 750 mg 150 mL (has no administration in time range)   cefepime (MAXIPIME) 2 g/50 mL dextrose IVPB (has no administration in time range)   sodium chloride 0.9 % bolus 1,000 mL (has no administration in time range)   acetaminophen (Ofirmev) IVPB 820 mg (has no administration in time range)       Diagnostic Studies  Results Reviewed       Procedure Component Value Units Date/Time    CBC and differential [[id number]] Collected: 06/14/24 2000    Lab Status: No result Specimen: Blood from Arm, Right     Comprehensive metabolic panel [[id number]] Collected: 06/14/24 2000    Lab Status: No result Specimen: Blood from Arm, Right     Lactic acid [[id number]] Collected: 06/14/24 2000    Lab Status: No result Specimen: Blood from Arm, Right     Procalcitonin [[id number]] Collected: 06/14/24 2000    Lab Status: No result Specimen: Blood from Arm, Right     Protime-INR [[id number]]  Collected: 06/14/24 2000    Lab Status: No result Specimen: Blood from Arm, Right     APTT [[id number]] Collected: 06/14/24 2000    Lab Status: No result Specimen: Blood from Arm, Right     Blood culture #1 [[id number]] Collected: 06/14/24 2000    Lab Status: No result Specimen: Blood from Arm, Right     FLU/RSV/COVID - if FLU/RSV clinically relevant [[id number]] Collected: 06/14/24 2000    Lab Status: No result Specimen: Nares from Nose     UA w Reflex to Microscopic w Reflex to Culture [[id number]]     Lab Status: No result Specimen: Urine                    XR chest 2 views    (Results Pending)              Procedures  ECG 12 Lead Documentation Only    Date/Time: 6/14/2024 8:03 PM    Performed by: Ralph Barbosa PA-C  Authorized by: Ralph Barbosa PA-C    ECG reviewed by me, the ED Provider: yes    Patient location:  ED  Interpretation:     Interpretation: normal    Quality:     Tracing quality:  Limited by artifact  Rate:     ECG rate:  103    ECG rate assessment: tachycardic    Rhythm:     Rhythm: sinus rhythm    Ectopy:     Ectopy: none    QRS:     QRS axis:  Normal    QRS intervals:  Normal  Conduction:     Conduction: normal    ST segments:     ST segments:  Normal  T waves:     T waves: normal             ED Course                                             Medical Decision Making  Ddx includes but not limited to viral syndrome, flu, covid, rsv, pneumonia, UTI. Plan: Labs. Abx. UA.     Amount and/or Complexity of Data Reviewed  Labs: ordered.  Radiology: ordered.    Risk  Prescription drug management.             Disposition  Final diagnoses:   None     ED Disposition       None          Follow-up Information    None         Patient's Medications   Discharge Prescriptions    No medications on file       No discharge procedures on file.    PDMP Review         Value Time User    PDMP Reviewed  Yes 11/21/2023  9:21 AM Uche Purdy MD            ED Provider  Electronically Signed by

## 2024-06-15 NOTE — ASSESSMENT & PLAN NOTE
78-year-old female patient with past medical history of COPD, chronic home O2 dependent 3 to 4 L baseline, history of combined CHF with EF of 35%, anemia, CAD, recent hospitalization and 05/2024 due to septic shock requiring ICU level of care, vasopressors secondary to E. coli bacteremia was discharged on 05/15/2024.  Again presented to [facility] emergency room with complaining of generalized weakness, poor p.o. intake, sepsis likely secondary to UTI.  Likely secondary to UTI  Continue ceftriaxone  Follow-up urine culture  Monitor WBC count  Monitor fever curve  Follow-up blood cultures

## 2024-06-15 NOTE — ASSESSMENT & PLAN NOTE
Present on admission history  anemia.  EGD in 2023 Mild erosive gastritis and Duodenal bulb angiectasia status post APC  Hemoglobin remained stable at approximately 8  No overt bleeding  Continue to monitor

## 2024-06-15 NOTE — PHYSICAL THERAPY NOTE
Physical Therapy Evaluation     Patient's Name: Lauren Quintero    Admitting Diagnosis  UTI (urinary tract infection) [N39.0]  Failure to thrive in adult [R62.7]  Sepsis (HCC) [A41.9]       06/15/24 1328   PT Last Visit   PT Visit Date 06/15/24   Note Type   Note type Evaluation   Pain Assessment   Pain Assessment Tool 0-10   Pain Score No Pain   Restrictions/Precautions   Weight Bearing Precautions Per Order No   Other Precautions O2;Fall Risk;Bed Alarm;Chair Alarm;Cognitive  (3 L O2 via NC)   Home Living   Type of Home House   Home Layout One level;Stairs to enter with rails  (4 ADELINA)   Bathroom Shower/Tub Tub/shower unit   Bathroom Toilet Standard   Bathroom Equipment Grab bars in shower;Tub transfer bench;Grab bars around toilet   Bathroom Accessibility Accessible   Home Equipment Other (Comment)  (rollator, 4L home O2.)   Prior Function   Level of [place] Needs assistance with ADLs;Needs assistance with functional mobility   Lives With Son   Receives Help From Family   IADLs Family/Friend/Other provides transportation;Family/Friend/Other provides meals;Family/Friend/Other provides medication management   Falls in the last 6 months 1 to 4  (Had a fall in February)   Vocational Retired   General   Family/Caregiver Present No   Cognition   Overall Cognitive Status WFL   Arousal/Participation Alert   Orientation Level Oriented X4   Memory Decreased recall of recent events   Following Commands Follows one step commands without difficulty   RLE Assessment   RLE Assessment X   Strength RLE   RLE Overall Strength 3+/5   LLE Assessment   LLE Assessment X   Strength LLE   LLE Overall Strength 3+/5   Light Touch   RLE Light Touch Grossly intact   LLE Light Touch Grossly intact   Bed Mobility   Supine to Sit 2  Maximal assistance   Additional items HOB elevated;Increased time required;Verbal cues;LE management   Transfers   Sit to Stand 3  Moderate assistance   Additional items Assist x 1;Increased time required;Verbal  cues   Stand to Sit 3  Moderate assistance   Additional items Assist x 1;Increased time required;Verbal cues;Bed elevated   Toilet transfer 3  Moderate assistance   Additional items Assist x 1;Armrests;Commode;Increased time required   Ambulation/Elevation   Gait pattern Improper Weight shift;Forward Flexion;Decreased foot clearance;Decreased R stance;Decreased L stance;Inconsistent tutu;Redundant gait at times;Short stride;Excessively slow;Decreased hip extension;Decreased heel strike;Decreased toe off;Step through pattern   Gait Assistance 3  Moderate assist   Additional items Assist x 1;Verbal cues   Assistive Device Rolling walker   Distance 2 ft   Balance   Static Sitting Fair +   Dynamic Sitting Fair   Static Standing Poor +   Dynamic Standing Poor   Ambulatory Poor   Endurance Deficit   Endurance Deficit Yes   Activity Tolerance   Activity Tolerance Patient limited by fatigue   Nurse Made Aware RN Gina   Assessment   Prognosis Good   Problem List Decreased strength;Decreased endurance;Decreased mobility;Impaired balance;Decreased coordination;Decreased safety awareness   Assessment Pt is 78 y.o. female seen for PT evaluation s/p admit to [facility] on 6/14/2024 w/ Sepsis ([facility]). PT consulted to assess pt's functional mobility and d/c needs. Order placed for PT eval and tx, w/ up as tolerated order. Comorbidities affecting pt's physical performance at time of assessment include: sepsis, COPD, CHF, CKD, anemia, CAD, HTN. PTA, pt was requiring A for mobility, ambulates household distances, has 4 ADELINA, lives w/ son in single level house, and retired. Personal factors affecting pt at time of IE include: inaccessible home environment, stairs to enter home, inability to ambulate household distances, unable to perform dynamic tasks in community, positive fall history, unable to perform physical activity, inability to perform IADLs, inability to perform ADLs, and inability to live alone. Please find  objective findings from PT assessment regarding body systems outlined above with impairments and limitations including weakness, impaired balance, decreased endurance, impaired coordination, gait deviations, decreased activity tolerance, decreased functional mobility tolerance, and fall risk. The following objective measures performed on IE also reveal limitations: Barthel Index: 30/100, Modified Dennis: 4 (moderate/severe disability), and AM-PAC 6-Clicks: 11/24. Pt's clinical presentation is currently unstable/unpredictable seen in pt's presentation of of continued need of medical management and monitoring, decreased strength and balance, leading to an increased risk of falls, decreased endurance and activity tolerance limiting mobility. Pt to benefit from continued PT tx to address deficits as defined above and maximize level of functional independent mobility and consistency. From PT/mobility standpoint, recommendation at time of d/c would be Level 2 Moderate resource intensity post acute rehabilitation services pending progress in order to facilitate return to PLOF.   Barriers to Discharge Inaccessible home environment   Goals   STG Expiration Date 06/25/24   Short Term Goal #1 In 7-10 days: Increase bilateral LE strength 1 grade to facilitate independent mobility, Perform all bed mobility tasks with min A of 1 to decrease caregiver burden, Perform all transfers with min A of 1 to improve independence, Ambulate > 25 ft. with RW with min A of 1 w/o LOB and w/ normalized gait pattern 100% of the time, and Increase all balance 1 grade to decrease risk for falls   Plan   Treatment/Interventions Functional transfer training;LE strengthening/ROM;Elevations;Therapeutic exercise;Endurance training;Cognitive reorientation;Patient/family training;Bed mobility;Gait training;Continued evaluation;Spoke to nursing   PT Frequency 3-5x/wk   AM-PAC Basic Mobility Inpatient   Turning in Flat Bed Without Bedrails 2   Lying on Back  to Sitting on Edge of Flat Bed Without Bedrails 2   Moving Bed to Chair 2   Standing Up From Chair Using Arms 2   Walk in Room 2   Climb 3-5 Stairs With Railing 1   Basic Mobility Inpatient Raw Score 11   Basic Mobility Standardized Score 30.25   [facility] Highest Level Of Mobility   [facility] Goal 4: Move to chair/commode   [facility] Achieved 4: Move to chair/commode   Modified Dennis Scale   Modified [place] Scale 4   Barthel Index   Feeding 10   Bathing 0   Grooming Score 0   Dressing Score 5   Bladder Score 0   Bowels Score 5   Toilet Use Score 5   Transfers (Bed/Chair) Score 5   Mobility (Level Surface) Score 0   Stairs Score 0   Barthel Index Score 30     Problem List  Patient Active Problem List   Diagnosis    Ambulatory dysfunction    Essential hypertension    Tobacco abuse    Acute on chronic respiratory failure with hypoxia (HCC)    Coronary artery disease of native artery of native heart with stable angina pectoris ([facility])    Flank pain    Major depressive disorder, recurrent episode, moderate (HCC)    Fatigue    COPD (chronic obstructive pulmonary disease) (HCC)    Anemia    Abnormal computed tomography angiography (CTA)    Severe protein-calorie malnutrition (HCC)    Positive blood culture    Wal[place]strom macroglobulinemia (HCC)    Generalized weakness    Staring episodes    BRBPR (bright red blood per rectum)    Moderate protein-calorie malnutrition (HCC)    Diverticulitis    Social problem    Dizziness    CKD (chronic kidney disease)    Elevated troponin    Chronic combined systolic and diastolic CHF (congestive heart failure) (HCC)    Abnormal chest x-ray    Loose stools    Insomnia    Non-ST elevation MI (NSTEMI) (HCC)    Pain    Diarrhea    Sepsis (HCC)    DARLING (acute kidney injury) (HCC)    Acute kidney injury superimposed on chronic kidney disease  (HCC)       Past Medical History  Past Medical History:   Diagnosis Date    Acute congestive heart failure (HCC) 08/27/2021    Anxiety     Cardiac disease      Chest pain 08/27/2021    Chronic pain disorder     COPD (chronic obstructive pulmonary disease) ([facility])     COVID-19 02/15/2024    Depression     Heart disease     Hyperlipidemia     Hypertension     MI (myocardial infarction) ([facility])     MRSA (methicillin resistant Staphylococcus aureus)     Renal disorder     benign kidney tumor       Past Surgical History  Past Surgical History:   Procedure Laterality Date    APPENDECTOMY      CARDIAC CATHETERIZATION N/A 2/21/2024    Procedure: Cardiac catheterization;  Surgeon: Luke Malagon MD;  Location: [facility];  Service: Cardiology    CARDIAC CATHETERIZATION N/A 2/21/2024    Procedure: Cardiac Coronary Angiogram;  Surgeon: Luke Malagon MD;  Location: [facility];  Service: Cardiology    CARDIAC CATHETERIZATION Left 2/21/2024    Procedure: Cardiac Left Heart Cath;  Surgeon: Luke Malagon MD;  Location: [facility];  Service: Cardiology    ELBOW BURSA SURGERY Left 02/2018    D/T MRSA INFECTION    IR THORACENTESIS  2/27/2024    SPINAL FUSION      L7 L9             Gilberto Becerra, PT

## 2024-06-15 NOTE — ASSESSMENT & PLAN NOTE
Patient does have history of hypertension, takes Toprol XL/Entresto at home.  Hold Toprol, Entresto for now.  BP stable

## 2024-06-15 NOTE — ASSESSMENT & PLAN NOTE
Wt Readings from Last 3 Encounters:   06/14/24 56.1 kg (123 lb 10.9 oz)   06/11/24 54.9 kg (121 lb)   06/05/24 55.2 kg (121 lb 9.6 oz)       History of combined CHF with the most recent EF of 35%  Has life vest   Continue telemetry monitor in hospital  GDMT includes Toprol XL, Entresto  Hold Toprol, Entresto.

## 2024-06-15 NOTE — ASSESSMENT & PLAN NOTE
Noted history; + smoker  Follows with Pulmonology OP  Chonically on 4L nc at home.  Not in acute exacerbation  Continue home inhalers

## 2024-06-15 NOTE — PLAN OF CARE
Problem: PHYSICAL THERAPY ADULT  Goal: Performs mobility at highest level of function for planned discharge setting.  See evaluation for individualized goals.  Description: Treatment/Interventions: Functional transfer training, LE strengthening/ROM, Elevations, Therapeutic exercise, Endurance training, Cognitive reorientation, Patient/family training, Bed mobility, Gait training, Continued evaluation, Spoke to nursing          See flowsheet documentation for full assessment, interventions and recommendations.  Outcome: Progressing  Note: Prognosis: Good  Problem List: Decreased strength, Decreased endurance, Decreased mobility, Impaired balance, Decreased coordination, Decreased safety awareness  Assessment: Pt is 78 y.o. female seen for PT evaluation s/p admit to [facility] on 6/14/2024 w/ Sepsis ([facility]). PT consulted to assess pt's functional mobility and d/c needs. Order placed for PT eval and tx, w/ up as tolerated order. Comorbidities affecting pt's physical performance at time of assessment include: sepsis, COPD, CHF, CKD, anemia, CAD, HTN. PTA, pt was requiring A for mobility, ambulates household distances, has 4 ADELINA, lives w/ son in single level house, and retired. Personal factors affecting pt at time of IE include: inaccessible home environment, stairs to enter home, inability to ambulate household distances, unable to perform dynamic tasks in community, positive fall history, unable to perform physical activity, inability to perform IADLs, inability to perform ADLs, and inability to live alone. Please find objective findings from PT assessment regarding body systems outlined above with impairments and limitations including weakness, impaired balance, decreased endurance, impaired coordination, gait deviations, decreased activity tolerance, decreased functional mobility tolerance, and fall risk. The following objective measures performed on IE also reveal limitations: Barthel Index: 30/100, Modified  Dennis: 4 (moderate/severe disability), and AM-PAC 6-Clicks: 11/24. Pt's clinical presentation is currently unstable/unpredictable seen in pt's presentation of of continued need of medical management and monitoring, decreased strength and balance, leading to an increased risk of falls, decreased endurance and activity tolerance limiting mobility. Pt to benefit from continued PT tx to address deficits as defined above and maximize level of functional independent mobility and consistency. From PT/mobility standpoint, recommendation at time of d/c would be Level 2 Moderate resource intensity post acute rehabilitation services pending progress in order to facilitate return to PLOF.  Barriers to Discharge: Inaccessible home environment          See flowsheet documentation for full assessment.

## 2024-06-15 NOTE — ED CARE HANDOFF
Emergency Department Sign Out Note        Sign out and transfer of care from Ralph Barbosa PA-C. See Separate Emergency Department note.     The patient, Lauren Quintero, was evaluated by the previous provider for failure to thrive.    Workup Completed:  CBC, CMP, lactic acid, procal, PT/PTT, APT, blood cultures, FLU/RSV/COVID, UA    ED Course / Workup Pending (followup):  Patient febrile, tachycardic with a WBC count will admit for uro sepsis and abx  Slight DARLING                                ED Course as of 06/15/24 0415   Fri Jun 14, 2024   2137 XR chest 2 views     No acute cardiopulmonary disease.     Redemonstration of partial right middle lobe atelectasis.       Procedures  Medical Decision Making  Vancomycin, Cefepime, and fluids ordered. Patient is well-appearing in no acute distress lying on the stretcher.  Discussed that she will be admitted for IV antibiotics and further evaluation and treatment.  Patient understands and agrees with treatment plan.  Discussed with oscar and will admit ops for further treatment and evaluation of her urosepsis.    Problems Addressed:  Sepsis (HCC): acute illness or injury  UTI (urinary tract infection): acute illness or injury    Amount and/or Complexity of Data Reviewed  Labs: ordered.  Radiology: ordered. Decision-making details documented in ED Course.    Risk  Decision regarding hospitalization.            Disposition  Final diagnoses:   UTI (urinary tract infection)   Sepsis (HCC)     Time reflects when diagnosis was documented in both MDM as applicable and the Disposition within this note       Time User Action Codes Description Comment    6/14/2024  9:33 PM Moises Vasquez Add [N39.0] UTI (urinary tract infection)     6/14/2024  9:34 PM Moises Vasquez Add [A41.9] Sepsis (HCC)           ED Disposition       ED Disposition   Admit    Condition   Stable    Date/Time   Fri Jun 14, 2024  9:51 PM    Comment   Case was discussed with STEVE and the patient's admission status  was agreed to be Admission Status: inpatient status to the service of Dr. Wylie .               Follow-up Information    None       Current Discharge Medication List        CONTINUE these medications which have NOT CHANGED    Details   aspirin (ECOTRIN LOW STRENGTH) 81 mg EC tablet Take 1 tablet (81 mg total) by mouth daily for 15 days Do not start before April 29, 2024.  Qty: 15 tablet, Refills: 0    Associated Diagnoses: COPD (chronic obstructive pulmonary disease) ([facility])      buPROPion (WELLBUTRIN) 75 mg tablet Take 1 tablet (75 mg total) by mouth in the morning for 15 days Do not start before April 29, 2024.  Qty: 15 tablet, Refills: 0    Associated Diagnoses: Major depressive disorder, recurrent episode, moderate (HCC)      Calcium 500 MG tablet Take 1 tablet (500 mg total) by mouth 2 (two) times a day for 15 days Do not start before April 29, 2024.  Qty: 30 tablet, Refills: 0    Associated Diagnoses: Moderate protein-calorie malnutrition (HCC)      cariprazine (Vraylar) 1.5 MG capsule Take 1 capsule (1.5 mg total) by mouth daily for 15 days Do not start before April 29, 2024.  Qty: 15 capsule, Refills: 0    Associated Diagnoses: Major depressive disorder, recurrent episode, moderate (HCC)      gabapentin (NEURONTIN) 100 mg capsule Take 1 capsule (100 mg total) by mouth daily at bedtime for 15 days Do not start before April 29, 2024.  Qty: 15 capsule, Refills: 0    Associated Diagnoses: Pain      ipratropium (ATROVENT) 0.02 % nebulizer solution Take 2.5 mL (0.5 mg total) by nebulization 3 (three) times a day for 15 days Do not start before April 29, 2024.  Qty: 112.5 mL, Refills: 0    Associated Diagnoses: COPD with acute exacerbation ([facility])      metoprolol succinate (TOPROL-XL) 100 mg 24 hr tablet Take 1 tablet (100 mg total) by mouth daily    Associated Diagnoses: Non-ST elevation MI (NSTEMI) ([facility])      nitroglycerin (NITROSTAT) 0.4 mg SL tablet Place 1 tablet (0.4 mg total) under the tongue every 5 (five)  minutes as needed for chest pain for up to 15 days Do not start before April 29, 2024.  Qty: 15 tablet, Refills: 0    Associated Diagnoses: Coronary artery disease of native artery of native heart with stable angina pectoris (HCC)      pantoprazole (PROTONIX) 40 mg tablet Take 1 tablet (40 mg total) by mouth daily for 15 doses Do not start before April 29, 2024.  Qty: 15 tablet, Refills: 0    Associated Diagnoses: Anemia, unspecified type      pravastatin (PRAVACHOL) 80 mg tablet Take 1 tablet (80 mg total) by mouth every evening for 15 days Do not start before April 29, 2024.  Qty: 15 tablet, Refills: 0    Associated Diagnoses: Elevated troponin      sacubitril-valsartan (Entresto) 24-26 MG TABS Take 1 tablet by mouth 2 (two) times a day for 30 doses Do not start before April 29, 2024.  Qty: 30 tablet, Refills: 0    Associated Diagnoses: Chronic combined systolic and diastolic CHF (congestive heart failure) (HCC)      umeclidinium-vilanterol (Anoro Ellipta) 62.5-25 mcg/actuation inhaler Inhale 1 puff daily    Associated Diagnoses: Chronic obstructive pulmonary disease, unspecified COPD type (HCC)      vilazodone (VIIBRYD) 40 mg tablet Take 1 tablet (40 mg total) by mouth daily with breakfast for 15 days Do not start before April 29, 2024.  Qty: 15 tablet, Refills: 0    Associated Diagnoses: Major depressive disorder, recurrent episode, moderate (HCC)      Zanubrutinib 80 MG CAPS Take 160 mg by mouth 2 (two) times a day           No discharge procedures on file.       ED Provider  Electronically Signed by     Moises Vsaquez PA-C  06/15/24 0415

## 2024-06-15 NOTE — H&P
[facility]  H&P  Name: Lauren Quintero 78 y.o. female I MRN: [id number]  Unit/Bed#: 2 E 258-01 I Date of Admission: 6/14/2024   Date of Service: 6/14/2024 I Hospital Day: 0      Assessment & Plan   * Sepsis (HCC)  Assessment & Plan  78-year-old female patient with past medical history of COPD, chronic home O2 dependent 3 to 4 L baseline, history of combined CHF with EF of 35%, anemia, CAD, recent hospitalization and 05/2024 due to septic shock requiring ICU level of care, vasopressors secondary to E. coli bacteremia was discharged on 05/15/2024.  Again presented to [facility] emergency room with complaining of generalized weakness, poor p.o. intake, sepsis likely secondary to UTI.    CBC noted with leukocytosis.  BUN noted with creatinine elevated from baseline.  Lactic acid within normal.  UA indicative of UTI.  COVID-19 status post status negative.  Chest x-ray reported negative for acute finding, noted with redemonstration of right middle lobe atelectasis.  Patient was given dose of IV vancomycin and cefepime in the emergency room.    Currently on my encounter patient is afebrile, hemodynamically stable, acutely nontoxic-appearing.  Transition to IV ceftriaxone for now.  Follow-up with pending urine and blood cultures.  Continue supportive care.      Chronic combined systolic and diastolic CHF (congestive heart failure) (HCC)  Assessment & Plan  Wt Readings from Last 3 Encounters:   06/14/24 56.1 kg (123 lb 10.9 oz)   06/11/24 54.9 kg (121 lb)   06/05/24 55.2 kg (121 lb 9.6 oz)       History of combined CHF with the most recent EF of 35%  Life vest was arranged previously.   Continue telemetry monitor in hospital  GDMT includes Toprol XL, Entresto  Hold Toprol, Entresto.        CKD (chronic kidney disease)  Assessment & Plan  Lab Results   Component Value Date    EGFR 25 06/14/2024    EGFR 32 05/15/2024    EGFR 33 05/14/2024    CREATININE 1.87 (H) 06/14/2024    CREATININE 1.54 (H) 05/15/2024     CREATININE 1.50 (H) 05/14/2024     Present on admission history of CKD.  Baseline creatinine around 1.5-1.6 range.  Currently slightly elevated compared to baseline to 1.87.  Hold Entresto, Toprol.  Continue with IV hydration and monitor BMP.    Anemia  Assessment & Plan  Present on admission history  anemia.  EGD in 2023 Mild erosive gastritis and Duodenal bulb angiectasia status post APC  Colonoscopy in  2023 Left-sided diverticulosis with old blood clots likely indicating a diverticular bleed   Had Normal out pt video capsule endoscopy.   Chronically hemoglobin around 8-9 range.  Current hemoglobin 8.4.  Monitor CBC tomorrow AM.    COPD (chronic obstructive pulmonary disease) ([facility])  Assessment & Plan  Noted history; + smoker  Follows with Pulmonology OP  Chonically on 4L nc at home.  Takes Advair at home.Continue nebs.    Coronary artery disease of native artery of native heart with stable angina pectoris ([facility])  Assessment & Plan  Continue with home medications - Aspirin, pravastatin, metoprolol  History of Multivessel Disease as noted in 2/24; declined CABG.    Essential hypertension  Assessment & Plan  Patient does have history of hypertension, takes Toprol XL/Entresto at home.  Hold Toprol, Entresto for now.  BP stable        VTE Pharmacologic Prophylaxis:   Moderate Risk (Score 3-4) - Pharmacological DVT Prophylaxis Ordered: heparin.  Code Status: Level 1 - Full Code    Anticipated Length of Stay: Patient will be admitted on an inpatient basis with an anticipated length of stay of greater than 2 midnights secondary to sepsis.    Total Time Spent on Date of Encounter in care of patient: 90 mins. This time was spent on one or more of the following: performing physical exam; counseling and coordination of care; obtaining or reviewing history; documenting in the medical record; reviewing/ordering tests, medications or procedures; communicating with other healthcare professionals and discussing with patient's  family/caregivers.    Chief Complaint: Generalized weakness, poor p.o. intake, overall not feeling well    History of Present Illness:    Lauren Quintero is a 78 y.o. female with a PMH of past medical history of COPD, chronic home O2 dependent 3 to 4 L baseline, history of combined CHF with EF of 35%, anemia, CAD, recent hospitalization and 05/2024 due to septic shock requiring ICU level of care, vasopressors secondary to E. coli bacteremia was discharged on 05/15/2024.  Patient reports that she was discharged to rehab and just discharged home 3 days ago.  Again presented to [facility] emergency room with complaining of generalized weakness, poor p.o. intake, sepsis likely secondary to UTI.  Currently on encounter patient appears weak, deconditioned, not in distress.  Denies chest pain, dyspnea, fever, chills, nausea, vomiting, any other new complaints.  No other events reported.    Review of Systems:  Review of Systems   Constitutional:  Positive for fatigue and fever. Negative for diaphoresis.   HENT:  Negative for congestion.    Eyes:  Negative for visual disturbance.   Respiratory:  Negative for cough and shortness of breath.    Cardiovascular:  Negative for chest pain, palpitations and leg swelling.   Gastrointestinal:  Negative for abdominal pain, diarrhea, nausea and vomiting.   Genitourinary:  Negative for hematuria.   Neurological:  Positive for weakness. Negative for dizziness and headaches.   Psychiatric/Behavioral:  Negative for agitation.        Past Medical and Surgical History:   Past Medical History:   Diagnosis Date    Acute congestive heart failure (HCC) 08/27/2021    Anxiety     Cardiac disease     Chest pain 08/27/2021    Chronic pain disorder     COPD (chronic obstructive pulmonary disease) ([facility])     COVID-19 02/15/2024    Depression     Heart disease     Hyperlipidemia     Hypertension     MI (myocardial infarction) ([facility])     MRSA (methicillin resistant Staphylococcus aureus)     Renal disorder      benign kidney tumor       Past Surgical History:   Procedure Laterality Date    APPENDECTOMY      CARDIAC CATHETERIZATION N/A 2/21/2024    Procedure: Cardiac catheterization;  Surgeon: Luke Malagon MD;  Location: [facility] CARDI[facility];  Service: Cardiology    CARDIAC CATHETERIZATION N/A 2/21/2024    Procedure: Cardiac Coronary Angiogram;  Surgeon: Luke Malagon MD;  Location: [facility];  Service: Cardiology    CARDIAC CATHETERIZATION Left 2/21/2024    Procedure: Cardiac Left Heart Cath;  Surgeon: Luke Malagon MD;  Location: [facility]ARD[facility];  Service: Cardiology    ELBOW BURSA SURGERY Left 02/2018    D/T MRSA INFECTION    IR THORACENTESIS  2/27/2024    SPINAL FUSION      L7 L9       Meds/Allergies:  Prior to Admission medications    Medication Sig Start Date End Date Taking? Authorizing Provider   aspirin (ECOTRIN LOW STRENGTH) 81 mg EC tablet Take 1 tablet (81 mg total) by mouth daily for 15 days Do not start before April 29, 2024. 4/29/24 5/14/24  TAMMIE Thomas   buPROPion (WELLBUTRIN) 75 mg tablet Take 1 tablet (75 mg total) by mouth in the morning for 15 days Do not start before April 29, 2024. 4/29/24 5/14/24  TAMMIE Thomas   Calcium 500 MG tablet Take 1 tablet (500 mg total) by mouth 2 (two) times a day for 15 days Do not start before April 29, 2024. 4/29/24 5/14/24  TAMMIE Thomas   cariprazine (Vraylar) 1.5 MG capsule Take 1 capsule (1.5 mg total) by mouth daily for 15 days Do not start before April 29, 2024. 4/29/24 5/14/24  TAMMIE Thomas   gabapentin (NEURONTIN) 100 mg capsule Take 1 capsule (100 mg total) by mouth daily at bedtime for 15 days Do not start before April 29, 2024. 4/29/24 5/14/24  TAMMIE Thomas   ipratropium (ATROVENT) 0.02 % nebulizer solution Take 2.5 mL (0.5 mg total) by nebulization 3 (three) times a day for 15 days Do not start before April 29, 2024. 4/29/24  5/14/24  TAMMIE Thomas   metoprolol succinate (TOPROL-XL) 100 mg 24 hr tablet Take 1 tablet (100 mg total) by mouth daily 5/15/24   Lexie Richter MD   nitroglycerin (NITROSTAT) 0.4 mg SL tablet Place 1 tablet (0.4 mg total) under the tongue every 5 (five) minutes as needed for chest pain for up to 15 days Do not start before April 29, 2024. 4/29/24 5/14/24  TAMMIE Thomas   pantoprazole (PROTONIX) 40 mg tablet Take 1 tablet (40 mg total) by mouth daily for 15 doses Do not start before April 29, 2024. 4/29/24 5/14/24  TAMMIE Thomas   pravastatin (PRAVACHOL) 80 mg tablet Take 1 tablet (80 mg total) by mouth every evening for 15 days Do not start before April 29, 2024. 4/29/24 5/14/24  TAMMIE Thomas   sacubitril-valsartan (Entresto) 24-26 MG TABS Take 1 tablet by mouth 2 (two) times a day for 30 doses Do not start before April 29, 2024. 4/29/24 5/14/24  TAMMIE Thomas   umeclidinium-vilanterol (Anoro Ellipta) 62.5-25 mcg/actuation inhaler Inhale 1 puff daily 5/28/24 6/27/24  TAMMIE Rojo   vilazodone (VIIBRYD) 40 mg tablet Take 1 tablet (40 mg total) by mouth daily with breakfast for 15 days Do not start before April 29, 2024. 4/29/24 5/14/24  TAMMIE Thomas   Zanubrutinib 80 MG CAPS Take 160 mg by mouth 2 (two) times a day  Patient not taking: Reported on 6/14/2024    Historical Provider, MD     I have reviewed home medications with a medical source (PCP, Pharmacy, other).    Allergies:   Allergies   Allergen Reactions    Sulfa Antibiotics Anaphylaxis    Iron GI Intolerance    Niacin     Statins Other (See Comments)     cramps       Social History:  Marital Status:      Substance Use History:   Social History     Substance and Sexual Activity   Alcohol Use Never     Social History     Tobacco Use   Smoking Status Former    Current packs/day: 0.00    Average packs/day: 1 pack/day for 60.0  "years (60.0 ttl pk-yrs)    Types: Cigarettes    Start date:     Quit date: 2016    Years since quittin.4   Smokeless Tobacco Never   Tobacco Comments    She has close to a 60 pack-year smoking history, most recently has cut down to 4-5 cigarettes daily     Social History     Substance and Sexual Activity   Drug Use No       Family History:  Family History   Problem Relation Age of Onset    Heart disease Mother     Cancer Father        Physical Exam:     Vitals:   Blood Pressure: 110/71 (24)  Pulse: 92 (24)  Temperature: 98.3 °F (36.8 °C) (24)  Temp Source: Oral (24)  Respirations: 16 (24)  Height: 5' 7\" (170.2 cm) (24)  Weight - Scale: 56.1 kg (123 lb 10.9 oz) (24)  SpO2: 100 % (24)    Physical Exam  Constitutional:       General: She is not in acute distress.     Appearance: Normal appearance. She is ill-appearing. She is not toxic-appearing or diaphoretic.      Comments: Elderly female, chronically deconditioned, acutely nontoxic appearing.   HENT:      Head: Normocephalic and atraumatic.   Eyes:      Pupils: Pupils are equal, round, and reactive to light.   Cardiovascular:      Rate and Rhythm: Normal rate.      Pulses: Normal pulses.   Pulmonary:      Effort: Pulmonary effort is normal. No respiratory distress.      Breath sounds: Normal breath sounds. No wheezing.      Comments: Currently resolution noted 98 to 99% on 3 to 4 L nasal cannula at her baseline.  Abdominal:      General: Bowel sounds are normal. There is no distension.      Palpations: Abdomen is soft.      Tenderness: There is no abdominal tenderness.   Neurological:      Mental Status: She is alert and oriented to person, place, and time. Mental status is at baseline.   Psychiatric:         Mood and Affect: Mood normal.         Behavior: Behavior normal.          Additional Data:     Lab Results:  Results from last 7 days   Lab Units 24"   WBC Thousand/uL 12.74*   HEMOGLOBIN g/dL 8.4*   HEMATOCRIT % 27.4*   PLATELETS Thousands/uL 200   SEGS PCT % 82*   LYMPHO PCT % 11*   MONO PCT % 6   EOS PCT % 1     Results from last 7 days   Lab Units 06/14/24 2000   SODIUM mmol/L 139   POTASSIUM mmol/L 4.5   CHLORIDE mmol/L 107   CO2 mmol/L 26   BUN mg/dL 40*   CREATININE mg/dL 1.87*   ANION GAP mmol/L 6   CALCIUM mg/dL 9.4   ALBUMIN g/dL 3.6   TOTAL BILIRUBIN mg/dL 0.41   ALK PHOS U/L 88   ALT U/L 3*   AST U/L 10*   GLUCOSE RANDOM mg/dL 114     Results from last 7 days   Lab Units 06/14/24 2000   INR  1.03         Lab Results   Component Value Date    HGBA1C 5.3 02/15/2024    HGBA1C 5.0 06/15/2022     Results from last 7 days   Lab Units 06/14/24 2000   LACTIC ACID mmol/L 0.9   PROCALCITONIN ng/ml 0.13       Lines/Drains:  Invasive Devices       Peripheral Intravenous Line  Duration             Peripheral IV 06/14/24 Left Antecubital <1 day    Peripheral IV 06/14/24 Proximal;Right;Ventral (anterior) Forearm <1 day                        Imaging: Reviewed radiology reports from this admission including: chest xray  XR chest 2 views   Final Result by Lisha Rivers MD (06/14 2040)      No acute cardiopulmonary disease.      Redemonstration of partial right middle lobe atelectasis.      Workstation performed: [id number]             EKG and Other Studies Reviewed on Admission:   EKG:  Sinus tachycardia.    ** Please Note: This note has been constructed using a voice recognition system. **

## 2024-06-15 NOTE — ASSESSMENT & PLAN NOTE
Lab Results   Component Value Date    EGFR 25 06/14/2024    EGFR 32 05/15/2024    EGFR 33 05/14/2024    CREATININE 1.87 (H) 06/14/2024    CREATININE 1.54 (H) 05/15/2024    CREATININE 1.50 (H) 05/14/2024     Present on admission history of CKD.  Baseline creatinine around 1.5-1.6 range.  Currently slightly elevated compared to baseline to 1.87.  Hold Entresto, Toprol.  Continue with IV hydration and monitor BMP.

## 2024-06-15 NOTE — PLAN OF CARE
Problem: INFECTION - ADULT  Goal: Absence or prevention of progression during hospitalization  Description: INTERVENTIONS:  - Assess and monitor for signs and symptoms of infection  - Monitor lab/diagnostic results  - Monitor all insertion sites, i.e. indwelling lines, tubes, and drains  - Monitor endotracheal if appropriate and nasal secretions for changes in amount and color  - Roca appropriate cooling/warming therapies per order  - Administer medications as ordered  - Instruct and encourage patient and family to use good hand hygiene technique  - Identify and instruct in appropriate isolation precautions for identified infection/condition  Outcome: Progressing  Goal: Absence of fever/infection during neutropenic period  Description: INTERVENTIONS:  - Monitor WBC    Outcome: Progressing     Problem: Knowledge Deficit  Goal: Patient/family/caregiver demonstrates understanding of disease process, treatment plan, medications, and discharge instructions  Description: Complete learning assessment and assess knowledge base.  Interventions:  - Provide teaching at level of understanding  - Provide teaching via preferred learning methods  Outcome: Progressing     Problem: DISCHARGE PLANNING  Goal: Discharge to home or other facility with appropriate resources  Description: INTERVENTIONS:  - Identify barriers to discharge w/patient and caregiver  - Arrange for needed discharge resources and transportation as appropriate  - Identify discharge learning needs (meds, wound care, etc.)  - Arrange for interpretive services to assist at discharge as needed  - Refer to Case Management Department for coordinating discharge planning if the patient needs post-hospital services based on physician/advanced practitioner order or complex needs related to functional status, cognitive ability, or social support system  Outcome: Progressing

## 2024-06-15 NOTE — ASSESSMENT & PLAN NOTE
Patient does have history of hypertension, takes Toprol XL/Entresto at home.  Likely resume Toprol and Entresto tomorrow

## 2024-06-15 NOTE — ASSESSMENT & PLAN NOTE
Present on admission history  anemia.  EGD in 2023 Mild erosive gastritis and Duodenal bulb angiectasia status post APC  Colonoscopy in  2023 Left-sided diverticulosis with old blood clots likely indicating a diverticular bleed   Had Normal out pt video capsule endoscopy.   Chronically hemoglobin around 8-9 range.  Current hemoglobin 8.4.  Monitor CBC tomorrow AM.

## 2024-06-15 NOTE — ASSESSMENT & PLAN NOTE
Wt Readings from Last 3 Encounters:   06/14/24 56.1 kg (123 lb 10.9 oz)   06/11/24 54.9 kg (121 lb)   06/05/24 55.2 kg (121 lb 9.6 oz)       History of combined CHF with the most recent EF of 35%  Has life vest   Hold Toprol, Entresto for now

## 2024-06-15 NOTE — ASSESSMENT & PLAN NOTE
78-year-old female patient with past medical history of COPD, chronic home O2 dependent 3 to 4 L baseline, history of combined CHF with EF of 35%, anemia, CAD, recent hospitalization and 05/2024 due to septic shock requiring ICU level of care, vasopressors secondary to E. coli bacteremia was discharged on 05/15/2024.  Again presented to [facility] emergency room with complaining of generalized weakness, poor p.o. intake, sepsis likely secondary to UTI.    CBC noted with leukocytosis.  BUN noted with creatinine elevated from baseline.  Lactic acid within normal.  UA indicative of UTI.  COVID-19 status post status negative.  Chest x-ray reported negative for acute finding, noted with redemonstration of right middle lobe atelectasis.  Patient was given dose of IV vancomycin and cefepime in the emergency room.    Currently on my encounter patient is afebrile, hemodynamically stable, clear nontoxic-appearing.  Transition to IV ceftriaxone for now.  Follow-up with pending urine and blood cultures.  Continue supportive care.

## 2024-06-15 NOTE — PROGRESS NOTES
[facility]  Progress Note  Name: Lauren Quintero I  MRN: [id number]  Unit/Bed#: 2 E 258-01 I Date of Admission: 6/14/2024   Date of Service: 6/15/2024 I Hospital Day: 1    Assessment & Plan   * Sepsis ([facility])  Assessment & Plan  78-year-old female patient with past medical history of COPD, chronic home O2 dependent 3 to 4 L baseline, history of combined CHF with EF of 35%, anemia, CAD, recent hospitalization and 05/2024 due to septic shock requiring ICU level of care, vasopressors secondary to E. coli bacteremia was discharged on 05/15/2024.  Again presented to [facility] emergency room with complaining of generalized weakness, poor p.o. intake, sepsis likely secondary to UTI.  Likely secondary to UTI  Continue ceftriaxone  Follow-up urine culture  Monitor WBC count  Monitor fever curve  Follow-up blood cultures    COPD (chronic obstructive pulmonary disease) ([facility])  Assessment & Plan  Noted history; + smoker  Follows with Pulmonology OP  Chonically on 4L nc at home.  Not in acute exacerbation  Continue home inhalers      Chronic combined systolic and diastolic CHF (congestive heart failure) ([facility])  Assessment & Plan  Wt Readings from Last 3 Encounters:   06/14/24 56.1 kg (123 lb 10.9 oz)   06/11/24 54.9 kg (121 lb)   06/05/24 55.2 kg (121 lb 9.6 oz)       History of combined CHF with the most recent EF of 35%  Has life vest   Hold Toprol, Entresto for now        CKD (chronic kidney disease)  Assessment & Plan  Creatinine remains at baseline of approximately 1.5  Continue to monitor    Anemia  Assessment & Plan  Present on admission history  anemia.  EGD in 2023 Mild erosive gastritis and Duodenal bulb angiectasia status post APC  Hemoglobin remained stable at approximately 8  No overt bleeding  Continue to monitor    Coronary artery disease of native artery of native heart with stable angina pectoris ([facility])  Assessment & Plan  Continue with home medications - Aspirin, pravastatin,  metoprolol  History of Multivessel Disease as noted in ; declined CABG.    Essential hypertension  Assessment & Plan  Patient does have history of hypertension, takes Toprol XL/Entresto at home.  Likely resume Toprol and Entresto tomorrow             VTE Pharmacologic Prophylaxis:   Pharmacologic: Heparin  Mechanical VTE Prophylaxis in Place: Yes    Patient Centered Rounds: I have performed bedside rounds with nursing staff today.    Discussions with Specialists or Other Care Team Provider: cm, nursing    Education and Discussions with Family / Patient: pt, son    Time Spent for Care: 30 minutes.  More than 50% of total time spent on counseling and coordination of care as described above.    Current Length of Stay: 1 day(s)    Current Patient Status: Inpatient   Certification Statement: The patient will continue to require additional inpatient hospital stay due to see below    Discharge Plan: Still requiring IV antibiotics    Code Status: Level 1 - Full Code      Subjective:     Denies chest pain, shortness of breath, cough or fevers,      Objective:     Vitals:   Temp (24hrs), Av.8 °F (37.7 °C), Min:98.3 °F (36.8 °C), Max:101.1 °F (38.4 °C)    Temp:  [98.3 °F (36.8 °C)-101.1 °F (38.4 °C)] 99.5 °F (37.5 °C)  HR:  [] 107  Resp:  [16-25] 16  BP: (110-167)/(67-85) 138/71  SpO2:  [94 %-100 %] 95 %  Body mass index is 19.37 kg/m².     Input and Output Summary (last 24 hours):       Intake/Output Summary (Last 24 hours) at 6/15/2024 1013  Last data filed at 6/15/2024 0407  Gross per 24 hour   Intake 707.5 ml   Output --   Net 707.5 ml       Physical Exam:     Physical Exam  Constitutional:       General: She is not in acute distress.     Appearance: She is well-developed. She is not diaphoretic.   HENT:      Head: Normocephalic and atraumatic.      Nose: Nose normal.      Mouth/Throat:      Mouth: Oropharynx is clear and moist.      Pharynx: No oropharyngeal exudate.   Eyes:      General: No scleral icterus.         Right eye: No discharge.         Left eye: No discharge.      Extraocular Movements: EOM normal.      Conjunctiva/sclera: Conjunctivae normal.   Neck:      Thyroid: No thyromegaly.      Vascular: No JVD.   Cardiovascular:      Rate and Rhythm: Normal rate and regular rhythm.      Heart sounds: Normal heart sounds. No murmur heard.     No friction rub. No gallop.   Pulmonary:      Effort: Pulmonary effort is normal. No respiratory distress.      Breath sounds: Normal breath sounds. No wheezing or rales.   Chest:      Chest wall: No tenderness.   Abdominal:      General: Bowel sounds are normal. There is no distension.      Palpations: Abdomen is soft.      Tenderness: There is no abdominal tenderness. There is no guarding or rebound.   Musculoskeletal:         General: No tenderness, deformity or edema. Normal range of motion.      Cervical back: Normal range of motion and neck supple.   Skin:     General: Skin is warm and dry.      Findings: No erythema or rash.   Neurological:      Mental Status: She is alert. Mental status is at baseline.      Cranial Nerves: No cranial nerve deficit.      Sensory: No sensory deficit.      Motor: No abnormal muscle tone.      Coordination: Coordination normal.   Psychiatric:         Mood and Affect: Mood and affect normal.           Additional Data:     Labs:    Results from last 7 days   Lab Units 06/15/24  0520 06/14/24 2000   WBC Thousand/uL 11.47* 12.74*   HEMOGLOBIN g/dL 7.8* 8.4*   HEMATOCRIT % 26.1* 27.4*   PLATELETS Thousands/uL 152 200   SEGS PCT %  --  82*   LYMPHO PCT %  --  11*   MONO PCT %  --  6   EOS PCT %  --  1     Results from last 7 days   Lab Units 06/15/24  0520 06/14/24 2000   SODIUM mmol/L 138 139   POTASSIUM mmol/L 4.0 4.5   CHLORIDE mmol/L 110* 107   CO2 mmol/L 23 26   BUN mg/dL 34* 40*   CREATININE mg/dL 1.56* 1.87*   ANION GAP mmol/L 5 6   CALCIUM mg/dL 8.8 9.4   ALBUMIN g/dL  --  3.6   TOTAL BILIRUBIN mg/dL  --  0.41   ALK PHOS U/L  --  88    ALT U/L  --  3*   AST U/L  --  10*   GLUCOSE RANDOM mg/dL 103 114     Results from last 7 days   Lab Units 06/14/24 2000   INR  1.03             Results from last 7 days   Lab Units 06/15/24  0520 06/14/24 2000   LACTIC ACID mmol/L  --  0.9   PROCALCITONIN ng/ml 0.50* 0.13           * I Have Reviewed All Lab Data Listed Above.  * Additional Pertinent Lab Tests Reviewed: All Labs Within Last 24 Hours Reviewed    Imaging:    Imaging Reports Reviewed Today Include: na  Imaging Personally Reviewed by Myself Includes:  na    Recent Cultures (last 7 days):     Results from last 7 days   Lab Units 06/14/24 2011 06/14/24 2000   BLOOD CULTURE  Received in Microbiology Lab. Culture in Progress. Received in Microbiology Lab. Culture in Progress.       Last 24 Hours Medication List:   Current Facility-Administered Medications   Medication Dose Route Frequency Provider Last Rate    aspirin  81 mg Oral Daily Domenic YAÑEZ MD      buPROPion  75 mg Oral Daily Domenic YAÑEZ MD      cefTRIAXone  1,000 mg Intravenous Q24H Domenic YAÑEZ MD 1,000 mg (06/15/24 0837)    gabapentin  100 mg Oral HS Domenic YAÑEZ MD      heparin (porcine)  5,000 Units Subcutaneous Q8H ROSELINE Domenic YAÑEZ MD      ipratropium  0.5 mg Nebulization TID Domenic YAÑEZ MD      pantoprazole  40 mg Oral Daily Domenic YAÑEZ MD      pravastatin  80 mg Oral QPM Domenic YAÑEZ MD      vilazodone  40 mg Oral Daily With Breakfast Domenic YAÑEZ MD          Today, Patient Was Seen By: Jimmy Rosen MD    ** Please Note: Dictation voice to text software may have been used in the creation of this document. **

## 2024-06-16 LAB
ANION GAP SERPL CALCULATED.3IONS-SCNC: 8 MMOL/L (ref 4–13)
ATRIAL RATE: 103 BPM
BASOPHILS # BLD AUTO: 0.02 THOUSANDS/ÂΜL (ref 0–0.1)
BASOPHILS NFR BLD AUTO: 0 % (ref 0–1)
BUN SERPL-MCNC: 28 MG/DL (ref 5–25)
CALCIUM SERPL-MCNC: 8.7 MG/DL (ref 8.4–10.2)
CHLORIDE SERPL-SCNC: 107 MMOL/L (ref 96–108)
CO2 SERPL-SCNC: 21 MMOL/L (ref 21–32)
CREAT SERPL-MCNC: 1.39 MG/DL (ref 0.6–1.3)
EOSINOPHIL # BLD AUTO: 0 THOUSAND/ÂΜL (ref 0–0.61)
EOSINOPHIL NFR BLD AUTO: 0 % (ref 0–6)
ERYTHROCYTE [DISTWIDTH] IN BLOOD BY AUTOMATED COUNT: 16.6 % (ref 11.6–15.1)
GFR SERPL CREATININE-BSD FRML MDRD: 36 ML/MIN/1.73SQ M
GLUCOSE SERPL-MCNC: 78 MG/DL (ref 65–140)
HCT VFR BLD AUTO: 23.2 % (ref 34.8–46.1)
HGB BLD-MCNC: 7.1 G/DL (ref 11.5–15.4)
IMM GRANULOCYTES # BLD AUTO: 0.03 THOUSAND/UL (ref 0–0.2)
IMM GRANULOCYTES NFR BLD AUTO: 0 % (ref 0–2)
LYMPHOCYTES # BLD AUTO: 1.5 THOUSANDS/ÂΜL (ref 0.6–4.47)
LYMPHOCYTES NFR BLD AUTO: 20 % (ref 14–44)
MCH RBC QN AUTO: 28.9 PG (ref 26.8–34.3)
MCHC RBC AUTO-ENTMCNC: 30.6 G/DL (ref 31.4–37.4)
MCV RBC AUTO: 94 FL (ref 82–98)
MONOCYTES # BLD AUTO: 0.56 THOUSAND/ÂΜL (ref 0.17–1.22)
MONOCYTES NFR BLD AUTO: 8 % (ref 4–12)
NEUTROPHILS # BLD AUTO: 5.35 THOUSANDS/ÂΜL (ref 1.85–7.62)
NEUTS SEG NFR BLD AUTO: 72 % (ref 43–75)
NRBC BLD AUTO-RTO: 0 /100 WBCS
P AXIS: 54 DEGREES
PLATELET # BLD AUTO: 135 THOUSANDS/UL (ref 149–390)
PMV BLD AUTO: 10.4 FL (ref 8.9–12.7)
POTASSIUM SERPL-SCNC: 3.6 MMOL/L (ref 3.5–5.3)
PR INTERVAL: 146 MS
QRS AXIS: 69 DEGREES
QRSD INTERVAL: 82 MS
QT INTERVAL: 328 MS
QTC INTERVAL: 429 MS
RBC # BLD AUTO: 2.46 MILLION/UL (ref 3.81–5.12)
SODIUM SERPL-SCNC: 136 MMOL/L (ref 135–147)
T WAVE AXIS: 51 DEGREES
VENTRICULAR RATE: 103 BPM
WBC # BLD AUTO: 7.46 THOUSAND/UL (ref 4.31–10.16)

## 2024-06-16 PROCEDURE — 94664 DEMO&/EVAL PT USE INHALER: CPT

## 2024-06-16 PROCEDURE — 80048 BASIC METABOLIC PNL TOTAL CA: CPT | Performed by: INTERNAL MEDICINE

## 2024-06-16 PROCEDURE — 99232 SBSQ HOSP IP/OBS MODERATE 35: CPT | Performed by: INTERNAL MEDICINE

## 2024-06-16 PROCEDURE — 85025 COMPLETE CBC W/AUTO DIFF WBC: CPT | Performed by: INTERNAL MEDICINE

## 2024-06-16 PROCEDURE — 93010 ELECTROCARDIOGRAM REPORT: CPT | Performed by: INTERNAL MEDICINE

## 2024-06-16 RX ADMIN — VILAZODONE HYDROCHLORIDE 40 MG: 20 TABLET ORAL at 08:20

## 2024-06-16 RX ADMIN — HEPARIN SODIUM 5000 UNITS: 5000 INJECTION INTRAVENOUS; SUBCUTANEOUS at 15:17

## 2024-06-16 RX ADMIN — ZANUBRUTINIB 160 MG: 80 CAPSULE, GELATIN COATED ORAL at 17:16

## 2024-06-16 RX ADMIN — HEPARIN SODIUM 5000 UNITS: 5000 INJECTION INTRAVENOUS; SUBCUTANEOUS at 21:40

## 2024-06-16 RX ADMIN — PANTOPRAZOLE SODIUM 40 MG: 40 TABLET, DELAYED RELEASE ORAL at 08:19

## 2024-06-16 RX ADMIN — ASPIRIN 81 MG: 81 TABLET, COATED ORAL at 08:19

## 2024-06-16 RX ADMIN — HEPARIN SODIUM 5000 UNITS: 5000 INJECTION INTRAVENOUS; SUBCUTANEOUS at 05:35

## 2024-06-16 RX ADMIN — GABAPENTIN 100 MG: 100 CAPSULE ORAL at 21:40

## 2024-06-16 RX ADMIN — PRAVASTATIN SODIUM 80 MG: 80 TABLET ORAL at 17:16

## 2024-06-16 RX ADMIN — BUPROPION HYDROCHLORIDE 75 MG: 75 TABLET, FILM COATED ORAL at 08:19

## 2024-06-16 RX ADMIN — ZANUBRUTINIB 160 MG: 80 CAPSULE, GELATIN COATED ORAL at 08:20

## 2024-06-16 RX ADMIN — CEFTRIAXONE SODIUM 1000 MG: 10 INJECTION, POWDER, FOR SOLUTION INTRAVENOUS at 08:22

## 2024-06-16 RX ADMIN — UMECLIDINIUM BROMIDE AND VILANTEROL TRIFENATATE 1 PUFF: 62.5; 25 POWDER RESPIRATORY (INHALATION) at 08:20

## 2024-06-16 NOTE — ASSESSMENT & PLAN NOTE
78-year-old female patient with past medical history of COPD, chronic home O2 dependent 3 to 4 L baseline, history of combined CHF with EF of 35%, anemia, CAD, recent hospitalization and 05/2024 due to septic shock requiring ICU level of care, vasopressors secondary to E. coli bacteremia was discharged on 05/15/2024.  Again presented to [facility] emergency room with complaining of generalized weakness, poor p.o. intake, sepsis likely secondary to UTI.  Likely secondary to UTI  Continue ceftriaxone  Complicated by gram-negative bacteremia  Clinically improving  Follow-up blood culture sensitivities  Follow-up urine culture  Will need 7 days of antibiotic therapy

## 2024-06-16 NOTE — PLAN OF CARE
Problem: Prexisting or High Potential for Compromised Skin Integrity  Goal: Skin integrity is maintained or improved  Description: INTERVENTIONS:  - Identify patients at risk for skin breakdown  - Assess and monitor skin integrity  - Assess and monitor nutrition and hydration status  - Monitor labs   - Assess for incontinence   - Turn and reposition patient  - Assist with mobility/ambulation  - Relieve pressure over bony prominences  - Avoid friction and shearing  - Provide appropriate hygiene as needed including keeping skin clean and dry  - Evaluate need for skin moisturizer/barrier cream  - Collaborate with interdisciplinary team   - Patient/family teaching  - Consider wound care consult   Outcome: Progressing     Problem: INFECTION - ADULT  Goal: Absence or prevention of progression during hospitalization  Description: INTERVENTIONS:  - Assess and monitor for signs and symptoms of infection  - Monitor lab/diagnostic results  - Monitor all insertion sites, i.e. indwelling lines, tubes, and drains  - Monitor endotracheal if appropriate and nasal secretions for changes in amount and color  - [place] appropriate cooling/warming therapies per order  - Administer medications as ordered  - Instruct and encourage patient and family to use good hand hygiene technique  - Identify and instruct in appropriate isolation precautions for identified infection/condition  Outcome: Progressing  Goal: Absence of fever/infection during neutropenic period  Description: INTERVENTIONS:  - Monitor WBC    Outcome: Progressing     Problem: Knowledge Deficit  Goal: Patient/family/caregiver demonstrates understanding of disease process, treatment plan, medications, and discharge instructions  Description: Complete learning assessment and assess knowledge base.  Interventions:  - Provide teaching at level of understanding  - Provide teaching via preferred learning methods  Outcome: Progressing

## 2024-06-16 NOTE — ASSESSMENT & PLAN NOTE
Continue home aspirin, statin, beta-blocker  History of Multivessel Disease as noted in 2/24; declined CABG.

## 2024-06-16 NOTE — PROGRESS NOTES
[facility]  Progress Note  Name: Lauren Quintero I  MRN: [id number]  Unit/Bed#: 2 E 258-01 I Date of Admission: 6/14/2024   Date of Service: 6/16/2024 I Hospital Day: 2    Assessment & Plan   * Sepsis ([facility])  Assessment & Plan  78-year-old female patient with past medical history of COPD, chronic home O2 dependent 3 to 4 L baseline, history of combined CHF with EF of 35%, anemia, CAD, recent hospitalization and 05/2024 due to septic shock requiring ICU level of care, vasopressors secondary to E. coli bacteremia was discharged on 05/15/2024.  Again presented to [facility] emergency room with complaining of generalized weakness, poor p.o. intake, sepsis likely secondary to UTI.  Likely secondary to UTI  Continue ceftriaxone  Complicated by gram-negative bacteremia  Clinically improving  Follow-up blood culture sensitivities  Follow-up urine culture  Will need 7 days of antibiotic therapy    COPD (chronic obstructive pulmonary disease) ([facility])  Assessment & Plan  Noted history; + smoker  Follows with Pulmonology OP  Chonically on 4L nc at home.  Not in acute exacerbation  Continue home inhalers      Chronic combined systolic and diastolic CHF (congestive heart failure) ([facility])  Assessment & Plan  Wt Readings from Last 3 Encounters:   06/14/24 56.1 kg (123 lb 10.9 oz)   06/11/24 54.9 kg (121 lb)   06/05/24 55.2 kg (121 lb 9.6 oz)       History of combined CHF with the most recent EF of 35%  Has life vest   Likely resume Toprol and Entresto tomorrow        CKD (chronic kidney disease)  Assessment & Plan  Creatinine remains at baseline of approximately 1.5  Remains near baseline  Continue to monitor    Anemia  Assessment & Plan  Present on admission history  anemia.  EGD in 2023 Mild erosive gastritis and Duodenal bulb angiectasia status post APC  Hemoglobin remained stable at approximately 8  No overt bleeding  Continue to monitor    Coronary artery disease of native artery of native heart with stable  angina pectoris ([facility])  Assessment & Plan  Continue home aspirin, statin, beta-blocker  History of Multivessel Disease as noted in ; declined CABG.    Essential hypertension  Assessment & Plan  Patient does have history of hypertension, takes Toprol XL/Entresto at home.  Likely resume Toprol and Entresto tomorrow               VTE Pharmacologic Prophylaxis:   Pharmacologic: Heparin  Mechanical VTE Prophylaxis in Place: Yes    Patient Centered Rounds: I have performed bedside rounds with nursing staff today.    Discussions with Specialists or Other Care Team Provider: cm, nursing    Education and Discussions with Family / Patient: pt, son    Time Spent for Care: 30 minutes.  More than 50% of total time spent on counseling and coordination of care as described above.    Current Length of Stay: 2 day(s)    Current Patient Status: Inpatient   Certification Statement: The patient will continue to require additional inpatient hospital stay due to see below    Discharge Plan: Still requiring IV antibiotics anticipate 48 hours medically will need rehab    Code Status: Level 1 - Full Code      Subjective:   Denies chest pain, shortness of breath, cough, fevers, chills    Objective:     Vitals:   Temp (24hrs), Av.7 °F (37.1 °C), Min:98.7 °F (37.1 °C), Max:98.7 °F (37.1 °C)    Temp:  [98.7 °F (37.1 °C)] 98.7 °F (37.1 °C)  HR:  [100-110] 108  Resp:  [16] 16  BP: (115-148)/(70-86) 148/78  SpO2:  [92 %-99 %] 97 %  Body mass index is 19.37 kg/m².     Input and Output Summary (last 24 hours):     No intake or output data in the 24 hours ending 24 0906    Physical Exam:     Physical Exam  Constitutional:       General: She is not in acute distress.     Appearance: She is well-developed. She is not diaphoretic.   HENT:      Head: Normocephalic and atraumatic.      Nose: Nose normal.      Mouth/Throat:      Mouth: Oropharynx is clear and moist.      Pharynx: No oropharyngeal exudate.   Eyes:      General: No scleral  icterus.        Right eye: No discharge.         Left eye: No discharge.      Extraocular Movements: EOM normal.      Conjunctiva/sclera: Conjunctivae normal.   Neck:      Thyroid: No thyromegaly.      Vascular: No JVD.   Cardiovascular:      Rate and Rhythm: Normal rate and regular rhythm.      Heart sounds: Normal heart sounds. No murmur heard.     No friction rub. No gallop.   Pulmonary:      Effort: Pulmonary effort is normal. No respiratory distress.      Breath sounds: Normal breath sounds. No wheezing or rales.   Chest:      Chest wall: No tenderness.   Abdominal:      General: Bowel sounds are normal. There is no distension.      Palpations: Abdomen is soft.      Tenderness: There is no abdominal tenderness. There is no guarding or rebound.   Musculoskeletal:         General: No tenderness, deformity or edema. Normal range of motion.      Cervical back: Normal range of motion and neck supple.   Skin:     General: Skin is warm and dry.      Findings: No erythema or rash.   Neurological:      Mental Status: She is alert. Mental status is at baseline.      Cranial Nerves: No cranial nerve deficit.      Sensory: No sensory deficit.      Motor: No abnormal muscle tone.      Coordination: Coordination normal.   Psychiatric:         Mood and Affect: Mood and affect normal.       (   Additional Data:     Labs:    Results from last 7 days   Lab Units 06/16/24  0524   WBC Thousand/uL 7.46   HEMOGLOBIN g/dL 7.1*   HEMATOCRIT % 23.2*   PLATELETS Thousands/uL 135*   SEGS PCT % 72   LYMPHO PCT % 20   MONO PCT % 8   EOS PCT % 0     Results from last 7 days   Lab Units 06/16/24  0524 06/15/24  0520 06/14/24  2000   SODIUM mmol/L 136   < > 139   POTASSIUM mmol/L 3.6   < > 4.5   CHLORIDE mmol/L 107   < > 107   CO2 mmol/L 21   < > 26   BUN mg/dL 28*   < > 40*   CREATININE mg/dL 1.39*   < > 1.87*   ANION GAP mmol/L 8   < > 6   CALCIUM mg/dL 8.7   < > 9.4   ALBUMIN g/dL  --   --  3.6   TOTAL BILIRUBIN mg/dL  --   --  0.41   ALK  PHOS U/L  --   --  88   ALT U/L  --   --  3*   AST U/L  --   --  10*   GLUCOSE RANDOM mg/dL 78   < > 114    < > = values in this interval not displayed.     Results from last 7 days   Lab Units 06/14/24 2000   INR  1.03             Results from last 7 days   Lab Units 06/15/24  0520 06/14/24 2000   LACTIC ACID mmol/L  --  0.9   PROCALCITONIN ng/ml 0.50* 0.13           * I Have Reviewed All Lab Data Listed Above.  * Additional Pertinent Lab Tests Reviewed: All Labs Within Last 24 Hours Reviewed    Imaging:    Imaging Reports Reviewed Today Include: na  Imaging Personally Reviewed by Myself Includes:  na    Recent Cultures (last 7 days):     Results from last 7 days   Lab Units 06/14/24 2011 06/14/24 2000   BLOOD CULTURE  No Growth at 24 hrs.  --    GRAM STAIN RESULT   --  Gram negative rods*       Last 24 Hours Medication List:   Current Facility-Administered Medications   Medication Dose Route Frequency Provider Last Rate    acetaminophen  650 mg Oral Q6H PRN Jaleel Rodrigez MD      albuterol  2.5 mg Nebulization Q6H PRN Jimmy Rosen MD      aspirin  81 mg Oral Daily Domenic YAÑEZ MD      buPROPion  75 mg Oral Daily Domenic YAÑEZ MD      cefTRIAXone  1,000 mg Intravenous Q24H Domenic YAÑEZ MD 1,000 mg (06/16/24 0822)    gabapentin  100 mg Oral HS Domenic YAÑEZ MD      heparin (porcine)  5,000 Units Subcutaneous Q8H [facility] Domenic YAÑEZ MD      morphine injection  2 mg Intravenous Q6H PRN Jaleel Rodrigez MD      oxyCODONE  5 mg Oral Q6H PRN Jaleel Rodrigez MD      pantoprazole  40 mg Oral Daily Domenic YAÑEZ MD      pravastatin  80 mg Oral QPM Domenic YAÑEZ MD      umeclidinium-vilanterol  1 puff Inhalation Daily Jaleel Rodrigez MD      vilazodone  40 mg Oral Daily With Breakfast Domenic YAÑEZ MD      Zanubrutinib  160 mg Oral BID Jimmy Rosen MD          Today, Patient Was Seen By: Jmimy Rosen MD    ** Please Note: Dictation voice to text software may have been used in the  creation of this document. **

## 2024-06-16 NOTE — ASSESSMENT & PLAN NOTE
Wt Readings from Last 3 Encounters:   06/14/24 56.1 kg (123 lb 10.9 oz)   06/11/24 54.9 kg (121 lb)   06/05/24 55.2 kg (121 lb 9.6 oz)       History of combined CHF with the most recent EF of 35%  Has life vest   Likely resume Toprol and Entresto tomorrow

## 2024-06-16 NOTE — RESPIRATORY THERAPY NOTE
RT Protocol Note  Lauren Quintero 78 y.o. female MRN: [id number]  Unit/Bed#: 2 E 258-01 Encounter: [id number]    Assessment    Principal Problem:    Sepsis (HCC)  Active Problems:    Essential hypertension    Coronary artery disease of native artery of native heart with stable angina pectoris (HCC)    COPD (chronic obstructive pulmonary disease) ([facility])    Anemia    CKD (chronic kidney disease)    Chronic combined systolic and diastolic CHF (congestive heart failure) ([facility])      Home Pulmonary Medications:     06/16/24 0736   Respiratory Protocol   Protocol Initiated? No   Protocol Selection Respiratory   Language Barrier? No   Medical & Social History Reviewed? Yes   Diagnostic Studies Reviewed? Yes   Physical Assessment Performed? Yes   Home Devices/Therapy Home O2   Respiratory Plan Home Bronchodilator Patient pathway   Respiratory Assessment   General Appearance Drowsy   Bilateral Breath Sounds Diminished   Resp Comments continue as ordered   O2 Device nc   Additional Assessments   Pulse (!) 108   SpO2 97 %       Home Devices/Therapy: Home O2    Past Medical History:   Diagnosis Date    Acute congestive heart failure (HCC) 08/27/2021    Anxiety     Cardiac disease     Chest pain 08/27/2021    Chronic pain disorder     COPD (chronic obstructive pulmonary disease) ([facility])     COVID-19 02/15/2024    Depression     Heart disease     Hyperlipidemia     Hypertension     MI (myocardial infarction) ([facility])     MRSA (methicillin resistant Staphylococcus aureus)     Renal disorder     benign kidney tumor     Social History     Socioeconomic History    Marital status:      Spouse name: None    Number of children: 3    Years of education: 13    Highest education level: High school graduate   Occupational History    Occupation:      Employer: [employer]     Comment: retired    Tobacco Use    Smoking status: Former     Current packs/day: 0.00     Average packs/day: 1 pack/day for 60.0 years (60.0  ttl pk-yrs)     Types: Cigarettes     Start date:      Quit date: 2016     Years since quittin.4    Smokeless tobacco: Never    Tobacco comments:     She has close to a 60 pack-year smoking history, most recently has cut down to 4-5 cigarettes daily   Vaping Use    Vaping status: Never Used   Substance and Sexual Activity    Alcohol use: Never    Drug use: No    Sexual activity: Not Currently   Other Topics Concern    None   Social History Narrative    None     Social Determinants of Health     Financial Resource Strain: Patient Declined (2024)    Received from [facility]    Overall Financial Resource Strain (CARDIA)     Difficulty of Paying Living Expenses: Patient declined   Food Insecurity: No Food Insecurity (2024)    Hunger Vital Sign     Worried About Running Out of Food in the Last Year: Never true     Ran Out of Food in the Last Year: Never true   Transportation Needs: No Transportation Needs (2024)    PRAPARE - Transportation     Lack of Transportation (Medical): No     Lack of Transportation (Non-Medical): No   Physical Activity: Patient Declined (2024)    Received from [facility]    Exercise Vital Sign     Days of Exercise per Week: Patient declined     Minutes of Exercise per Session: Patient declined   Stress: Patient Declined (2024)    Received from [facility]    [place] [place] [employer] - Occupational Stress Questionnaire     Feeling of Stress : Patient declined   Social Connections: Patient Declined (2024)    Received from [facility]    Social Connection and Isolation Panel [NHANES]     Frequency of Communication with Friends and Family: Patient declined     Frequency of Social Gatherings with Friends and Family: Patient declined     Attends Restoration Services: Patient declined     Active Member of Clubs or Organizations: Patient declined     Attends Club  "or Organization Meetings: Patient declined     Marital Status: Patient declined   Intimate Partner Violence: Patient Declined (1/6/2024)    Received from [facility]    Humiliation, Afraid, Rape, and Kick questionnaire     Fear of Current or Ex-Partner: Patient declined     Emotionally Abused: Patient declined     Physically Abused: Patient declined     Sexually Abused: Patient declined   Housing Stability: Low Risk  (6/14/2024)    Housing Stability Vital Sign     Unable to Pay for Housing in the Last Year: No     Number of Times Moved in the Last Year: 1     Homeless in the Last Year: No       Subjective         Objective    Physical Exam:   General Appearance: Drowsy  Respiratory Pattern: Shallow  Chest Assessment: Chest expansion symmetrical  Bilateral Breath Sounds: Diminished  Cough: None  O2 Device: nc    Vitals:  Blood pressure 148/78, pulse (!) 108, temperature 98.7 °F (37.1 °C), temperature source Oral, resp. rate 16, height 5' 7\" (1.702 m), weight 56.1 kg (123 lb 10.9 oz), SpO2 97%, not currently breastfeeding.          Imaging and other studies: I have personally reviewed pertinent reports.      O2 Device: nc     Plan    Respiratory Plan: Home Bronchodilator Patient pathway        Resp Comments: continue as ordered   "

## 2024-06-16 NOTE — RESPIRATORY THERAPY NOTE
RT Protocol Note  Lauren Quintero 78 y.o. female MRN: [id number]  Unit/Bed#: 2 E 258-01 Encounter: [id number]    Assessment    Principal Problem:    Sepsis (HCC)  Active Problems:    Essential hypertension    Coronary artery disease of native artery of native heart with stable angina pectoris (HCC)    COPD (chronic obstructive pulmonary disease) ([facility])    Anemia    CKD (chronic kidney disease)    Chronic combined systolic and diastolic CHF (congestive heart failure) ([facility])      Home Pulmonary Medications:  Advair  Home Devices/Therapy: Home O2 (4L)    Past Medical History:   Diagnosis Date    Acute congestive heart failure (HCC) 2021    Anxiety     Cardiac disease     Chest pain 2021    Chronic pain disorder     COPD (chronic obstructive pulmonary disease) ([facility])     COVID-19 02/15/2024    Depression     Heart disease     Hyperlipidemia     Hypertension     MI (myocardial infarction) ([facility])     MRSA (methicillin resistant Staphylococcus aureus)     Renal disorder     benign kidney tumor     Social History     Socioeconomic History    Marital status:      Spouse name: None    Number of children: 3    Years of education: 13    Highest education level: High school graduate   Occupational History    Occupation: HTP     Employer: [employer]     Comment: retired    Tobacco Use    Smoking status: Former     Current packs/day: 0.00     Average packs/day: 1 pack/day for 60.0 years (60.0 ttl pk-yrs)     Types: Cigarettes     Start date:      Quit date: 2016     Years since quittin.4    Smokeless tobacco: Never    Tobacco comments:     She has close to a 60 pack-year smoking history, most recently has cut down to 4-5 cigarettes daily   Vaping Use    Vaping status: Never Used   Substance and Sexual Activity    Alcohol use: Never    Drug use: No    Sexual activity: Not Currently   Other Topics Concern    None   Social History Narrative    None     Social Determinants of Health      Financial Resource Strain: Patient Declined (1/6/2024)    Received from [facility] [employer] [facility]    Overall Financial Resource Strain (CARDIA)     Difficulty of Paying Living Expenses: Patient declined   Food Insecurity: No Food Insecurity (6/14/2024)    Hunger Vital Sign     Worried About Running Out of Food in the Last Year: Never true     Ran Out of Food in the Last Year: Never true   Transportation Needs: No Transportation Needs (6/14/2024)    PRAPARE - Transportation     Lack of Transportation (Medical): No     Lack of Transportation (Non-Medical): No   Physical Activity: Patient Declined (1/6/2024)    Received from [facility] [employer] [facility]    Exercise Vital Sign     Days of Exercise per Week: Patient declined     Minutes of Exercise per Session: Patient declined   Stress: Patient Declined (1/6/2024)    Received from [facility] [employer] [facility]    [place] [place] [employer] - Occupational Stress Questionnaire     Feeling of Stress : Patient declined   Social Connections: Patient Declined (1/6/2024)    Received from [facility]    Social Connection and Isolation Panel [NHANES]     Frequency of Communication with Friends and Family: Patient declined     Frequency of Social Gatherings with Friends and Family: Patient declined     Attends Rastafari Services: Patient declined     Active Member of Clubs or Organizations: Patient declined     Attends Club or Organization Meetings: Patient declined     Marital Status: Patient declined   Intimate Partner Violence: Patient Declined (1/6/2024)    Received from [facility] [employer]    Humiliation, Afraid, Rape, and Kick questionnaire     Fear of Current or Ex-Partner: Patient declined     Emotionally Abused: Patient declined     Physically Abused: Patient declined     Sexually Abused: Patient declined   Housing Stability: Low Risk  (6/14/2024)    Housing Stability Vital Sign     Unable to Pay  "for Housing in the Last Year: No     Number of Times Moved in the Last Year: 1     Homeless in the Last Year: No       Subjective         Objective    Physical Exam:   Assessment Type: Pre-treatment  General Appearance: Drowsy  Respiratory Pattern: Shallow  Chest Assessment: Chest expansion symmetrical  Bilateral Breath Sounds: Clear, Diminished  Cough: None  O2 Device: 4L o2 nc    Vitals:  Blood pressure 135/77, pulse (!) 108, temperature 98.7 °F (37.1 °C), resp. rate 16, height 5' 7\" (1.702 m), weight 56.1 kg (123 lb 10.9 oz), SpO2 94%, not currently breastfeeding.          Imaging and other studies:     O2 Device: 4L o2 nc     Plan    Respiratory Plan: Home Bronchodilator Patient pathway        Resp Comments: respirations are even and non labored, pt with hx of COPD, o2 dependent at 4L, Atrovent Tid prescription has , pt does use maintenance inhaler at home daily, pt is not in exacerbation, try to have inhaled steroid added to this admission and change nebulizer  frequency, pt remains at o2 baseline not requiring additional oxygen support  "

## 2024-06-17 LAB
ANION GAP SERPL CALCULATED.3IONS-SCNC: 7 MMOL/L (ref 4–13)
BACTERIA BLD CULT: ABNORMAL
BASOPHILS # BLD AUTO: 0.02 THOUSANDS/ÂΜL (ref 0–0.1)
BASOPHILS NFR BLD AUTO: 0 % (ref 0–1)
BUN SERPL-MCNC: 26 MG/DL (ref 5–25)
CALCIUM SERPL-MCNC: 8.3 MG/DL (ref 8.4–10.2)
CHLORIDE SERPL-SCNC: 107 MMOL/L (ref 96–108)
CO2 SERPL-SCNC: 24 MMOL/L (ref 21–32)
CREAT SERPL-MCNC: 1.5 MG/DL (ref 0.6–1.3)
E COLI DNA BLD POS QL NAA+NON-PROBE: DETECTED
EOSINOPHIL # BLD AUTO: 0.1 THOUSAND/ÂΜL (ref 0–0.61)
EOSINOPHIL NFR BLD AUTO: 2 % (ref 0–6)
ERYTHROCYTE [DISTWIDTH] IN BLOOD BY AUTOMATED COUNT: 16.7 % (ref 11.6–15.1)
GFR SERPL CREATININE-BSD FRML MDRD: 33 ML/MIN/1.73SQ M
GLUCOSE SERPL-MCNC: 100 MG/DL (ref 65–140)
GRAM STN SPEC: ABNORMAL
HCT VFR BLD AUTO: 24.1 % (ref 34.8–46.1)
HGB BLD-MCNC: 7.4 G/DL (ref 11.5–15.4)
IMM GRANULOCYTES # BLD AUTO: 0.03 THOUSAND/UL (ref 0–0.2)
IMM GRANULOCYTES NFR BLD AUTO: 1 % (ref 0–2)
LYMPHOCYTES # BLD AUTO: 1.5 THOUSANDS/ÂΜL (ref 0.6–4.47)
LYMPHOCYTES NFR BLD AUTO: 28 % (ref 14–44)
MCH RBC QN AUTO: 28.4 PG (ref 26.8–34.3)
MCHC RBC AUTO-ENTMCNC: 30.7 G/DL (ref 31.4–37.4)
MCV RBC AUTO: 92 FL (ref 82–98)
MONOCYTES # BLD AUTO: 0.71 THOUSAND/ÂΜL (ref 0.17–1.22)
MONOCYTES NFR BLD AUTO: 13 % (ref 4–12)
NEUTROPHILS # BLD AUTO: 3.09 THOUSANDS/ÂΜL (ref 1.85–7.62)
NEUTS SEG NFR BLD AUTO: 56 % (ref 43–75)
NRBC BLD AUTO-RTO: 0 /100 WBCS
PLATELET # BLD AUTO: 143 THOUSANDS/UL (ref 149–390)
PMV BLD AUTO: 10.5 FL (ref 8.9–12.7)
POTASSIUM SERPL-SCNC: 3.6 MMOL/L (ref 3.5–5.3)
RBC # BLD AUTO: 2.61 MILLION/UL (ref 3.81–5.12)
SODIUM SERPL-SCNC: 138 MMOL/L (ref 135–147)
WBC # BLD AUTO: 5.45 THOUSAND/UL (ref 4.31–10.16)

## 2024-06-17 PROCEDURE — 99232 SBSQ HOSP IP/OBS MODERATE 35: CPT | Performed by: INTERNAL MEDICINE

## 2024-06-17 PROCEDURE — 97167 OT EVAL HIGH COMPLEX 60 MIN: CPT

## 2024-06-17 PROCEDURE — 94664 DEMO&/EVAL PT USE INHALER: CPT

## 2024-06-17 PROCEDURE — 85025 COMPLETE CBC W/AUTO DIFF WBC: CPT | Performed by: INTERNAL MEDICINE

## 2024-06-17 PROCEDURE — 80048 BASIC METABOLIC PNL TOTAL CA: CPT | Performed by: INTERNAL MEDICINE

## 2024-06-17 RX ORDER — METOPROLOL SUCCINATE 100 MG/1
100 TABLET, EXTENDED RELEASE ORAL DAILY
Status: DISCONTINUED | OUTPATIENT
Start: 2024-06-17 | End: 2024-06-21 | Stop reason: HOSPADM

## 2024-06-17 RX ADMIN — ASPIRIN 81 MG: 81 TABLET, COATED ORAL at 08:18

## 2024-06-17 RX ADMIN — HEPARIN SODIUM 5000 UNITS: 5000 INJECTION INTRAVENOUS; SUBCUTANEOUS at 14:17

## 2024-06-17 RX ADMIN — PANTOPRAZOLE SODIUM 40 MG: 40 TABLET, DELAYED RELEASE ORAL at 08:27

## 2024-06-17 RX ADMIN — GABAPENTIN 100 MG: 100 CAPSULE ORAL at 22:02

## 2024-06-17 RX ADMIN — CEFTRIAXONE SODIUM 1000 MG: 10 INJECTION, POWDER, FOR SOLUTION INTRAVENOUS at 08:23

## 2024-06-17 RX ADMIN — VILAZODONE HYDROCHLORIDE 40 MG: 20 TABLET ORAL at 08:23

## 2024-06-17 RX ADMIN — ZANUBRUTINIB 160 MG: 80 CAPSULE, GELATIN COATED ORAL at 17:16

## 2024-06-17 RX ADMIN — SACUBITRIL AND VALSARTAN 1 TABLET: 24; 26 TABLET, FILM COATED ORAL at 10:08

## 2024-06-17 RX ADMIN — SACUBITRIL AND VALSARTAN 1 TABLET: 24; 26 TABLET, FILM COATED ORAL at 17:15

## 2024-06-17 RX ADMIN — ZANUBRUTINIB 160 MG: 80 CAPSULE, GELATIN COATED ORAL at 08:19

## 2024-06-17 RX ADMIN — HEPARIN SODIUM 5000 UNITS: 5000 INJECTION INTRAVENOUS; SUBCUTANEOUS at 22:02

## 2024-06-17 RX ADMIN — HEPARIN SODIUM 5000 UNITS: 5000 INJECTION INTRAVENOUS; SUBCUTANEOUS at 05:38

## 2024-06-17 RX ADMIN — METOPROLOL SUCCINATE 100 MG: 100 TABLET, EXTENDED RELEASE ORAL at 10:08

## 2024-06-17 RX ADMIN — BUPROPION HYDROCHLORIDE 75 MG: 75 TABLET, FILM COATED ORAL at 08:18

## 2024-06-17 RX ADMIN — PRAVASTATIN SODIUM 80 MG: 80 TABLET ORAL at 17:15

## 2024-06-17 NOTE — ASSESSMENT & PLAN NOTE
Wt Readings from Last 3 Encounters:   06/14/24 56.1 kg (123 lb 10.9 oz)   06/11/24 54.9 kg (121 lb)   06/05/24 55.2 kg (121 lb 9.6 oz)       History of combined CHF with the most recent EF of 35%  Has life vest   Resume Toprol and Entresto

## 2024-06-17 NOTE — CASE MANAGEMENT
Case Management Assessment & Discharge Planning Note    Patient name Lauren Quintero  Location 2 EAST 258/2 E 258-01 MRN [id number]  : 1945 Date 2024       Current Admission Date: 2024  Current Admission Diagnosis:Sepsis ([facility])   Patient Active Problem List    Diagnosis Date Noted Date Diagnosed    Acute kidney injury superimposed on chronic kidney disease  ([facility]) 2024     Sepsis ([facility]) 2024     DARLING (acute kidney injury) ([facility]) 2024     Diarrhea 2024     Pain 04/15/2024     Loose stools 2024     Insomnia 2024     Non-ST elevation MI (NSTEMI) ([facility]) 2024     Abnormal chest x-ray 2024     Chronic combined systolic and diastolic CHF (congestive heart failure) ([facility]) 2024     Elevated troponin 2024     CKD (chronic kidney disease) 2024     Dizziness 2023     Social problem 2023     Diverticulitis 10/29/2023     Moderate protein-calorie malnutrition ([facility]) 2023     BRBPR (bright red blood per rectum) 2023     Generalized weakness 2023     Staring episodes 2023     Waldenstrom macroglobulinemia ([facility]) 2023     Severe protein-calorie malnutrition ([facility]) 2021     Positive blood culture 2021     COPD (chronic obstructive pulmonary disease) ([facility]) 2021     Anemia 2021     Abnormal computed tomography angiography (CTA) 2021     Fatigue 2021     Major depressive disorder, recurrent episode, moderate ([facility]) 2019     Flank pain 2019     Coronary artery disease of native artery of native heart with stable angina pectoris ([facility]) 2018     Acute on chronic respiratory failure with hypoxia ([facility]) 2018     Ambulatory dysfunction 2018     Essential hypertension 2018     Tobacco abuse 2018       LOS (days): 3  Geometric Mean LOS (GMLOS) (days): 3.6  Days to GMLOS:1.2     OBJECTIVE:  PATIENT READMITTED TO HOSPITAL  Risk of Unplanned Readmission  Score: 42.95         Current admission status: Inpatient       Preferred Pharmacy:   [id number] [place] - [address]  [address]  [place] [facility] [address]  Phone: [phone] Fax: [phone]    [facility] [place] - [address]  [address]  [address]  [place] [address]  Phone: [phone] Fax: [phone]    [place], [place] - [address] [facility]  [address] [facility] [address]  Phone: [phone] Fax: [phone]    [facility] [place] - [address]  [address]  [place] [address]  Phone: [phone] Fax: [phone]    Primary Care Provider: Starla Bateman MD    Primary Insurance: MEDICARE  Secondary Insurance:     ASSESSMENT:  Active Health Care Proxies       Imer Quintero Health Care Agent - Son   Primary Phone: [phone] (Home)                 Advance Directives  Does patient have a Health Care POA?: Yes  Does patient have Advance Directives?: Yes  Advance Directives: Living will, Power of  for health care  Primary Contact: fadia Willams         Readmission Root Cause  30 Day Readmission: Yes  Who directed you to return to the hospital?: Family  Did you understand whom to contact if you had questions or problems?: Yes  Did you get your prescriptions before you left the hospital?: No  Reason:: Preference for own pharmacy  Were you able to get your prescriptions filled when you left the hospital?: Yes  Did you take your medications as prescribed?: Yes  Were you able to get to your follow-up appointments?: Yes  During previous admission, was a post-acute recommendation made?: Yes  What post-acute resources were offered?: STR  Patient was readmitted due to: weakness  Action Plan: IV abx    Patient Information  Admitted from:: Home  Mental Status: Alert  During Assessment patient was accompanied by: Not accompanied during assessment  Assessment information  provided by:: Patient  Primary Caregiver: Family  Caregiver's Name:: Imer  Caregiver's Relationship to Patient:: Family Member  Caregiver's Telephone Number:: [phone]  Support Systems: Son  County of Residence: [place]  What city do you live in?: [place]  Home entry access options. Select all that apply.: Stairs  Number of steps to enter home.: 4  Do the steps have railings?: Yes  Type of Current Residence: [facility]  Living Arrangements: Lives w/ Son  Is patient a ?: No    Activities of Daily Living Prior to Admission  Functional Status: Assistance  Completes ADLs independently?: Yes (but son stands outside the door to be available in case of a fall)  Ambulates independently?: No  Level of ambulatory dependence: Assistance  Does patient use assisted devices?: Yes  Assisted Devices (DME) used: Walker, Wheelchair, Shower Chair, Home Oxygen concentrator, Portable Oxygen tanks  DME Company Name (respiratory supplies): unsure of the name of the company  O2 Rate(s): 4 lpm  Does patient currently own DME?: Yes  What DME does the patient currently own?: Wheelchair, Shower Chair, Walker  Does patient have a history of Outpatient Therapy (PT/OT)?: No  Does the patient have a history of Short-Term Rehab?: Yes ([facility])  Does patient have a history of HHC?: Yes ([facility])  Does patient currently have HHC?: No         Patient Information Continued  Income Source: SSI/SSD  Does patient have prescription coverage?: Yes  Does patient receive dialysis treatments?: No  Does patient have a history of substance abuse?: No  Does patient have a history of Mental Health Diagnosis?: Yes  Is patient receiving treatment for mental health?: Yes  Has patient received inpatient treatment related to mental health in the last 2 years?: Yes (MDD-was in an Adult Behavior Unit)    PHQ 2/9 Screening   Reviewed PHQ 2/9 Depression Screening Score?: No    Means of Transportation  Means of Transport to  Appts:: Family transport      Social Determinants of Health (SDOH)      Flowsheet Row Most Recent Value   Housing Stability    In the last 12 months, was there a time when you were not able to pay the mortgage or rent on time? N   In the past 12 months, how many times have you moved where you were living? 1  [but was recently in [facility] for rehab]   At any time in the past 12 months, were you homeless or living in a shelter (including now)? N   Transportation Needs    In the past 12 months, has lack of transportation kept you from medical appointments or from getting medications? no   In the past 12 months, has lack of transportation kept you from meetings, work, or from getting things needed for daily living? No   Food Insecurity    Within the past 12 months, you worried that your food would run out before you got the money to buy more. Never true   Within the past 12 months, the food you bought just didn't last and you didn't have money to get more. Never true   Utilities    In the past 12 months has the electric, gas, oil, or water company threatened to shut off services in your home? No            DISCHARGE DETAILS:    Discharge planning discussed with:: patient  Freedom of Choice: Yes  Comments - Freedom of Choice: Pt informs CM that she was just recently d/jaguar from [facility] and wants to be d/jaguar home with her son when medically cleared.  [facility]  She has used this agency in the past and was pleased with the services.  Referrals placed.  CM left message with fadia Willams to call back to confirm dcp  CM contacted family/caregiver?: No- see comments (left message for fadia Willams to call back)  Were Treatment Team discharge recommendations reviewed with patient/caregiver?: Yes  Did patient/caregiver verbalize understanding of patient care needs?: Yes  Were patient/caregiver advised of the risks associated with not following Treatment Team discharge recommendations?:  Yes    Contacts  Patient Contacts: Imer  Relationship to Patient:: Family    Requested Home Health Care         Is the patient interested in HHC at discharge?: Yes  Home Health Discipline requested:: Home Health Aide, Nursing, Physical Therapy, Occupational Therapy  Home Health Agency Name:: [facility]  HHA External Referral Reason (only applicable if external HHA name selected): Patient has established relationship with provider  Home Health Follow-Up Provider:: PCP  Home Health Services Needed:: Evaluate Functional Status and Safety, Gait/ADL Training, COPD Management, Heart Failure Management, Oxygen Via Nasal Cannula, Strengthening/Theraputic Exercises to Improve Function  Oxygen LPM Ordered (if applicable based on home health services needed):: 4 LPM  Homebound Criteria Met:: Uses an Assist Device (i.e. cane, walker, etc), Requires the Assistance of Another Person for Safe Ambulation or to Leave the Home  Supporting Clincal Findings:: Fatigues Easliy in Short Distances, Dyspnea with Exertion, Requires Oxygen, Limited Endurance    DME Referral Provided  Referral made for DME?: No    Other Referral/Resources/Interventions Provided:  Interventions: HHC  Referral Comments: [facility] pended    Would you like to participate in our [facility] service program?  : No - Declined    Treatment Team Recommendation: Home with Home Health Care  Discharge Destination Plan:: Home with Home Health Care  Transport at Discharge : Family

## 2024-06-17 NOTE — PROGRESS NOTES
Patient:    MRN:  [id number]    [place] Request ID:  [id number]    Level of care reserved:  Home Health Agency    Partner Reserved:  [facility], [place] [address] [phone]    Clinical needs requested:    Geography searched:  [id number]    Start of Service:    Request sent:  12:19pm EDT on 6/17/2024 by Mireya Vanessa    Partner reserved:  1:20pm EDT on 6/17/2024 by Mireya Vanessa    Choice list shared:

## 2024-06-17 NOTE — RESPIRATORY THERAPY NOTE
RT Protocol Note  Lauren Quintero 78 y.o. female MRN: [id number]  Unit/Bed#: 2 E 258-01 Encounter: [id number]    Assessment    Principal Problem:    Sepsis ([facility])  Active Problems:    Essential hypertension    Coronary artery disease of native artery of native heart with stable angina pectoris ([facility])    COPD (chronic obstructive pulmonary disease) ([facility])    Anemia    CKD (chronic kidney disease)    Chronic combined systolic and diastolic CHF (congestive heart failure) ([facility])      Home Pulmonary Medications:     06/17/24 1127   Respiratory Protocol   Protocol Initiated? No   Protocol Selection Respiratory   Language Barrier? No   Medical & Social History Reviewed? Yes   Diagnostic Studies Reviewed? Yes   Physical Assessment Performed? Yes   Home Devices/Therapy Home O2   Respiratory Plan Home Bronchodilator Patient pathway   Respiratory Assessment   Resp Comments continue as ordered   Additional Assessments   Pulse 94   Respirations 18   SpO2 100 %       Home Devices/Therapy: Home O2    Past Medical History:   Diagnosis Date    Acute congestive heart failure (HCC) 08/27/2021    Anxiety     Cardiac disease     Chest pain 08/27/2021    Chronic pain disorder     COPD (chronic obstructive pulmonary disease) ([facility])     COVID-19 02/15/2024    Depression     Heart disease     Hyperlipidemia     Hypertension     MI (myocardial infarction) ([facility])     MRSA (methicillin resistant Staphylococcus aureus)     Renal disorder     benign kidney tumor     Social History     Socioeconomic History    Marital status:      Spouse name: None    Number of children: 3    Years of education: 13    Highest education level: High school graduate   Occupational History    Occupation:      Employer: [employer]     Comment: retired    Tobacco Use    Smoking status: Former     Current packs/day: 0.00     Average packs/day: 1 pack/day for 60.0 years (60.0 ttl pk-yrs)     Types: Cigarettes     Start date: 1956     Quit  date: 2016     Years since quittin.4    Smokeless tobacco: Never    Tobacco comments:     She has close to a 60 pack-year smoking history, most recently has cut down to 4-5 cigarettes daily   Vaping Use    Vaping status: Never Used   Substance and Sexual Activity    Alcohol use: Never    Drug use: No    Sexual activity: Not Currently   Other Topics Concern    None   Social History Narrative    None     Social Determinants of Health     Financial Resource Strain: Patient Declined (2024)    [facility]    Overall Financial Resource Strain (CARDIA)     Difficulty of Paying Living Expenses: Patient declined   Food Insecurity: No Food Insecurity (2024)    Hunger Vital Sign     Worried About Running Out of Food in the Last Year: Never true     Ran Out of Food in the Last Year: Never true   Transportation Needs: No Transportation Needs (2024)    PRAPARE - Transportation     Lack of Transportation (Medical): No     Lack of Transportation (Non-Medical): No   Physical Activity: Patient Declined (2024)    [facility]    Exercise Vital Sign     Days of Exercise per Week: Patient declined     Minutes of Exercise per Session: Patient declined   Stress: Patient Declined (2024)    [facility]    [place] [employer] - Occupational Stress Questionnaire     Feeling of Stress : Patient declined   Social Connections: Patient Declined (2024)    Received from [facility]    Social Connection and Isolation Panel [NHANES]     Frequency of Communication with Friends and Family: Patient declined     Frequency of Social Gatherings with Friends and Family: Patient declined     Attends [employer] Services: Patient declined     Active Member of Clubs or Organizations: Patient declined     Attends Club or Organization Meetings: Patient declined     Marital Status:  "Patient declined   Intimate Partner Violence: Patient Declined (1/6/2024)    [facility]    Humiliation, Afraid, Rape, and Kick questionnaire     Fear of Current or Ex-Partner: Patient declined     Emotionally Abused: Patient declined     Physically Abused: Patient declined     Sexually Abused: Patient declined   Housing Stability: Low Risk  (6/17/2024)    Housing Stability Vital Sign     Unable to Pay for Housing in the Last Year: No     Number of Times Moved in the Last Year: 1     Homeless in the Last Year: No       Subjective         Objective    Physical Exam:        Vitals:  Blood pressure 117/63, pulse 94, temperature 98.1 °F (36.7 °C), temperature source Oral, resp. rate 18, height 5' 7\" (1.702 m), weight 56.1 kg (123 lb 10.9 oz), SpO2 100%, not currently breastfeeding.          Imaging and other studies: I have personally reviewed pertinent reports.      O2 Device: nc     Plan    Respiratory Plan: Home Bronchodilator Patient pathway        Resp Comments: continue as ordered   "

## 2024-06-17 NOTE — ASSESSMENT & PLAN NOTE
78-year-old female patient with past medical history of COPD, chronic home O2 dependent 3 to 4 L baseline, history of combined CHF with EF of 35%, anemia, CAD, recent hospitalization and 05/2024 due to septic shock requiring ICU level of care, vasopressors secondary to E. coli bacteremia was discharged on 05/15/2024.  Again presented to [facility] emergency room with complaining of generalized weakness, poor p.o. intake, sepsis likely secondary to UTI.  Likely secondary to UTI  Continue ceftriaxone  Complicated by E. coli bacteremia  Pansensitive  Can likely transition to next tomorrow   will need 7 days of antibiotic therapy

## 2024-06-17 NOTE — PLAN OF CARE
Problem: OCCUPATIONAL THERAPY ADULT  Goal: Performs self-care activities at highest level of function for planned discharge setting.  See evaluation for individualized goals.  Description: Treatment Interventions: ADL retraining, Functional transfer training, UE strengthening/ROM, Endurance training, Patient/family training, Equipment evaluation/education, Compensatory technique education, Continued evaluation, Cardiac education, Energy conservation, Activityengagement          See flowsheet documentation for full assessment, interventions and recommendations.   Note: Limitation: Decreased ADL status, Decreased UE strength, Decreased endurance, Decreased self-care trans, Decreased high-level ADLs, Decreased Safe judgement during ADL, Decreased cognition  Prognosis: Good  Assessment: Pt is a 78 y.o. female seen for OT evaluation s/p admit to [facility] on 6/14/2024 w/ Sepsis (HCC). Comorbidities affecting pt's functional performance at time of assessment include:DARLING, anemia, CAD, COPD, CKD, depression, HTN, sepsis, flank pain, and tobacco abuse . Orders placed for OT evaluation and treatment.  Performed at least two patient identifiers during session including name and wristband. Personal factors affecting pt at time of IE include:steps to enter environment, difficulty performing ADLS, difficulty performing IADLS , limited insight into deficits, decreased initiation and engagement , financial barriers, health management , and environment. Prior to admission, pt reports Ind with ADLs, A with IADLs, and (-) driving.  Upon evaluation: Pt requires min A with UB ADLs, mod A with LB ADLs, mod A with xfers and min A with functional mobility 2* the following deficits impacting occupational performance: weakness, decreased ROM, decreased strength, decreased dynamic sit/ stand balance, decreased activity tolerance, decreased standing tolerance time for self care and functional mobility, impaired memory, impaired  problem solving, decreased safety awareness, environmental deficits, decreased mobilty, and requiring external assistance to complete transitional movements. Pt to benefit from continued skilled OT tx while in the hospital to address deficits as defined above and maximize level of functional independence w ADL's and functional mobility. Occupational Performance areas to address include: bathing/shower, toilet hygiene, dressing, medication management, health maintenance, functional mobility, community mobility, clothing management, cleaning, and household maintenance. From OT standpoint, recommendation at time of d/c would be Level 2 (Mod Resource Intensity).     Rehab Resource Intensity Level, OT: II (Moderate Resource Intensity)

## 2024-06-17 NOTE — OCCUPATIONAL THERAPY NOTE
Occupational Therapy Evaluation      Lauren Quintero    6/17/2024    Principal Problem:    Sepsis ([facility])  Active Problems:    Essential hypertension    Coronary artery disease of native artery of native heart with stable angina pectoris ([facility])    COPD (chronic obstructive pulmonary disease) ([facility])    Anemia    CKD (chronic kidney disease)    Chronic combined systolic and diastolic CHF (congestive heart failure) ([facility])      Past Medical History:   Diagnosis Date    Acute congestive heart failure ([facility]) 08/27/2021    Anxiety     Cardiac disease     Chest pain 08/27/2021    Chronic pain disorder     COPD (chronic obstructive pulmonary disease) ([facility])     COVID-19 02/15/2024    Depression     Heart disease     Hyperlipidemia     Hypertension     MI (myocardial infarction) ([facility])     MRSA (methicillin resistant Staphylococcus aureus)     Renal disorder     benign kidney tumor       Past Surgical History:   Procedure Laterality Date    APPENDECTOMY      CARDIAC CATHETERIZATION N/A 2/21/2024    Procedure: Cardiac catheterization;  Surgeon: Luke Malagon MD;  Location: [facility];  Service: Cardiology    CARDIAC CATHETERIZATION N/A 2/21/2024    Procedure: Cardiac Coronary Angiogram;  Surgeon: Luke Malagon MD;  Location: [facility];  Service: Cardiology    CARDIAC CATHETERIZATION Left 2/21/2024    Procedure: Cardiac Left Heart Cath;  Surgeon: Luke Malagon MD;  Location: [facility];  Service: Cardiology    ELBOW BURSA SURGERY Left 02/2018    D/T MRSA INFECTION    IR THORACENTESIS  2/27/2024    SPINAL FUSION      L7 L9       06/17/24 0924   OT Last Visit   OT Visit Date 06/17/24   Note Type   Note type Evaluation   Pain Assessment   Pain Assessment Tool 0-10   Pain Score No Pain   Restrictions/Precautions   Weight Bearing Precautions Per Order No   Other Precautions O2;Fall Risk;Bed Alarm;Chair Alarm;Cognitive  (4L O2 via NC)   Home Living   Type of Home House   Home Layout One level;Performs  ADLs on one level;Able to live on main level with bedroom/bathroom;Stairs to enter with rails  (4 ADELINA)   Bathroom Shower/Tub Tub/shower unit   Bathroom Toilet Standard   Bathroom Equipment Grab bars in shower;Tub transfer bench;Grab bars around toilet;Hand-held shower   Bathroom Accessibility Accessible   Home Equipment Walker;Other (Comment)  (4L o2)   Additional Comments Pt reports using RW   Prior Function   Level of [place] Needs assistance with functional mobility;Independent with ADLs;Needs assistance with IADLS   Lives With Son   Receives Help From Family   IADLs Family/Friend/Other provides transportation;Family/Friend/Other provides meals;Family/Friend/Other provides medication management  (cleaning and laundry with A)   Falls in the last 6 months 1 to 4  (Per PT eval fall in Feb; Pt denies all falls)   Vocational Retired  ([employer])   Lifestyle   Autonomy Pt reports PLOF was Ind with ADLs, A with IADLs, and (-) driving   Reciprocal Relationships 2 (local) sons & 1 (non local) son, 10+ grands, 6 grands   ADL   Eating Assistance 5  Supervision/Setup   Eating Deficit Increased time to complete;Supervision/safety;Setup   Grooming Assistance 5  Supervision/Setup   Grooming Deficit Increased time to complete;Supervision/safety;Setup   UB Bathing Assistance 4  Minimal Assistance   UB Bathing Deficit Increased time to complete;Supervision/safety;Verbal cueing;Steadying;Setup   LB Bathing Assistance 3  Moderate Assistance   LB Bathing Deficit Increased time to complete;Supervision/safety;Verbal cueing;Steadying;Setup   UB Dressing Assistance 4  Minimal Assistance   UB Dressing Deficit Increased time to complete;Supervision/safety;Verbal cueing;Steadying;Setup   LB Dressing Assistance 3  Moderate Assistance   LB Dressing Deficit Increased time to complete;Supervision/safety;Verbal cueing;Steadying;Setup   Toileting Assistance  4  Minimal Assistance   Toileting Deficit Increased time to  complete;Supervison/safety;Verbal cueing;Steadying;Setup   Functional Assistance 3  Moderate Assistance   Functional Deficit Increased time to complete;Supervision/safety;Verbal cueing;Steadying;Setup   Bed Mobility   Supine to Sit 4  Minimal assistance   Additional items Assist x 1;Bedrails;Increased time required;Verbal cues;LE management   Transfers   Sit to Stand 3  Moderate assistance   Additional items Assist x 1;Increased time required;Verbal cues;Armrests   Stand to Sit 3  Moderate assistance   Additional items Assist x 1;Increased time required;Verbal cues;Armrests   Functional Mobility   Functional Mobility 4  Minimal assistance   Additional items Rolling walker   Balance   Static Sitting Good   Dynamic Sitting Fair +   Static Standing Fair   Dynamic Standing Fair -   Ambulatory Fair -   Activity Tolerance   Activity Tolerance Patient limited by fatigue   RUE Assessment   RUE Assessment   (ROM WFL/ MMT +3/5)   LUE Assessment   LUE Assessment   (ROM WFL/ MMT +3/5)   Hand Function   Gross Motor Coordination Functional   Fine Motor Coordination Functional   Sensation   Light Touch No apparent deficits   Sharp/Dull No apparent deficits   Stereognosis No apparent deficits   Proprioception   Proprioception No apparent deficits   Vision-Basic Assessment   Current Vision No visual deficits   Vision - Complex Assessment   Ocular Range of Motion Intact   Psychosocial   Psychosocial (WDL) WDL   Perception   Inattention/Neglect Appears intact   Cognition   Overall Cognitive Status   (questionable)   Arousal/Participation Alert;Cooperative   Attention Within functional limits   Orientation Level Oriented X4   Memory Decreased recall of recent events   Following Commands Follows one step commands without difficulty   Comments Pt was agreeable to OT eval   Assessment   Limitation Decreased ADL status;Decreased UE strength;Decreased endurance;Decreased self-care trans;Decreased high-level ADLs;Decreased Safe judgement  during ADL;Decreased cognition   Prognosis Good   Assessment Pt is a 78 y.o. female seen for OT evaluation s/p admit to [facility] on 6/14/2024 w/ Sepsis (HCC). Comorbidities affecting pt's functional performance at time of assessment include:DARLING, anemia, CAD, COPD, CKD, depression, HTN, sepsis, flank pain, and tobacco abuse . Orders placed for OT evaluation and treatment.  Performed at least two patient identifiers during session including name and wristband. Personal factors affecting pt at time of IE include:steps to enter environment, difficulty performing ADLS, difficulty performing IADLS , limited insight into deficits, decreased initiation and engagement , financial barriers, health management , and environment. Prior to admission, pt reports Ind with ADLs, A with IADLs, and (-) driving.  Upon evaluation: Pt requires min A with UB ADLs, mod A with LB ADLs, mod A with xfers and min A with functional mobility 2* the following deficits impacting occupational performance: weakness, decreased ROM, decreased strength, decreased dynamic sit/ stand balance, decreased activity tolerance, decreased standing tolerance time for self care and functional mobility, impaired memory, impaired problem solving, decreased safety awareness, environmental deficits, decreased mobilty, and requiring external assistance to complete transitional movements. Pt to benefit from continued skilled OT tx while in the hospital to address deficits as defined above and maximize level of functional independence w ADL's and functional mobility. Occupational Performance areas to address include: bathing/shower, toilet hygiene, dressing, medication management, health maintenance, functional mobility, community mobility, clothing management, cleaning, and household maintenance. From OT standpoint, recommendation at time of d/c would be Level 2 (Mod Resource Intensity).   Plan   Treatment Interventions ADL retraining;Functional transfer  training;UE strengthening/ROM;Endurance training;Patient/family training;Equipment evaluation/education;Compensatory technique education;Continued evaluation;Cardiac education;Energy conservation;Activityengagement   Goal Expiration Date 06/30/24   OT Frequency 3-5x/wk   Discharge Recommendation   Rehab Resource Intensity Level, OT II (Moderate Resource Intensity)   AM-PAC Daily Activity Inpatient   Lower Body Dressing 2   Bathing 2   Toileting 3   Upper Body Dressing 2   Grooming 3   Eating 3   Daily Activity Raw Score 15   Daily Activity Standardized Score (Calc for Raw Score >=11) 34.69   AM-PAC Applied Cognition Inpatient   Following a Speech/Presentation 2   Understanding Ordinary Conversation 4   Taking Medications 3   Remembering Where Things Are Placed or Put Away 3   Remembering List of 4-5 Errands 3   Taking Care of Complicated Tasks 2   Applied Cognition Raw Score 17   Applied Cognition Standardized Score 36.52   Barthel Index   Grooming Score 0   Dressing Score 5   Toilet Use Score 5   Transfers (Bed/Chair) Score 5   Mobility (Level Surface) Score 0     Occupational Therapy goals: In 7-14 days:    Patient will verbalize and demonstrate use of energy conservation/ deep breathing technique and work simplification skills during functional activity with no verbal cues.    Patient will verbalize and demonstrate good body mechanics and joint protection techniques during  ADLs/ IADLs with no verbal cues.    Patient will increase OOB/ sitting tolerance to 2-4 hours per day for increased participation in self care and leisure tasks with no s/s of exertion.    Patient will increase standing tolerance time to 5  minutes with unilateral UE support to complete sink level ADLs@ mod I level.    Patient will increase sitting tolerance at edge of bed to 20 minutes to complete UB ADLs @ set up assist level.    Patient will transfer bed to Chair / toilet at S with AD as indicated.    Patient will complete UB ADLs with  S.    Patient will complete LB ADLs with S.    Patient will complete toileting hygiene with S for thoroughness.    Patient/ Family  will demonstrate competency with UE Home Exercise Program.       Zeenat Santamaria MS OTR/L

## 2024-06-17 NOTE — CASE MANAGEMENT
Case Management Discharge Planning Note    Patient name Lauren Quintero  Location 2 EAST 258/2 E 258-01 MRN [id number]  : 1945 Date 2024       Current Admission Date: 2024  Current Admission Diagnosis:Sepsis ([facility])   Patient Active Problem List    Diagnosis Date Noted Date Diagnosed    Acute kidney injury superimposed on chronic kidney disease  ([facility]) 2024     Sepsis ([facility]) 2024     DARLING (acute kidney injury) ([facility]) 2024     Diarrhea 2024     Pain 04/15/2024     Loose stools 2024     Insomnia 2024     Non-ST elevation MI (NSTEMI) ([facility]) 2024     Abnormal chest x-ray 2024     Chronic combined systolic and diastolic CHF (congestive heart failure) ([facility]) 2024     Elevated troponin 2024     CKD (chronic kidney disease) 2024     Dizziness 2023     Social problem 2023     Diverticulitis 10/29/2023     Moderate protein-calorie malnutrition ([facility]) 2023     BRBPR (bright red blood per rectum) 2023     Generalized weakness 2023     Staring episodes 2023     Waldenstrom macroglobulinemia ([facility]) 2023     Severe protein-calorie malnutrition ([facility]) 2021     Positive blood culture 2021     COPD (chronic obstructive pulmonary disease) ([facility]) 2021     Anemia 2021     Abnormal computed tomography angiography (CTA) 2021     Fatigue 2021     Major depressive disorder, recurrent episode, moderate ([facility]) 2019     Flank pain 2019     Coronary artery disease of native artery of native heart with stable angina pectoris ([facility]) 2018     Acute on chronic respiratory failure with hypoxia ([facility]) 2018     Ambulatory dysfunction 2018     Essential hypertension 2018     Tobacco abuse 2018       LOS (days): 3  Geometric Mean LOS (GMLOS) (days): 3.6  Days to GMLOS:1     OBJECTIVE:  Risk of Unplanned Readmission Score: 43.64         Current admission status:  Inpatient   Preferred Pharmacy:   [id number] - [place] - [address]  [address]  [place] [facility] [place] [address]  Phone: [phone] Fax: [phone]    [facility]  [place] - [address]  [address]  [address]  [place] [address]  Phone: [phone] Fax: [phone]    [place], [place] - [address] [facility]  [address] [facility] [address]  Phone: [phone] Fax: [phone]    [facility] [place] - [address]  [address]  [place] [address]  Phone: [phone] Fax: [phone]    Primary Care Provider: Starla Bateman MD    Primary Insurance: MEDICARE  Secondary Insurance:     DISCHARGE DETAILS:    Discharge planning discussed with:: pt at bedside and dennis Willams via phone  Freedom of Choice: Yes  Comments - Freedom of Choice: Dennis Willams confirms that he wants pt to be d/[facility] home.  He is asking for a referral to [facility] and they accepted and were reserved.  IMM reivewed and signed by pt.  Copy to pt and copy to MR for scanning  CM contacted family/caregiver?: Yes  Were Treatment Team discharge recommendations reviewed with patient/caregiver?: Yes  Did patient/caregiver verbalize understanding of patient care needs?: Yes  Were patient/caregiver advised of the risks associated with not following Treatment Team discharge recommendations?: Yes    Contacts  Patient Contacts: Imer  Relationship to Patient:: Family  Contact Method: Phone  Phone Number: [phone]  Reason/Outcome: Discharge Planning    Requested Home Health Care         Is the patient interested in HHC at discharge?: Yes  Home Health Agency Name:: [facility]  HHA External Referral Reason (only applicable if external HHA name selected): Patient has established relationship with provider  Home Health Follow-Up Provider:: PCP    DME Referral Provided  Referral made for DME?: No    Other  Referral/Resources/Interventions Provided:  Interventions: [facility]  Referral Comments: [facility] accepted and reserved.  Pt is current with them    Would you like to participate in our [facility] service program?  : No - Declined    Treatment Team Recommendation: Home with Home Health Care  Discharge Destination Plan:: Home with Home Health Care  Transport at Discharge : Family                             IMM Given (Date):: 06/17/24  IMM Given to:: Patient  Family notified:: son Imer

## 2024-06-17 NOTE — ASSESSMENT & PLAN NOTE
Patient does have history of hypertension, takes Toprol XL/Entresto at home.  Resume Toprol/Entresto  BP currently controlled will monitor

## 2024-06-17 NOTE — PROGRESS NOTES
[facility]  Progress Note  Name: Lauren Quintero I  MRN: [id number]  Unit/Bed#: 2 E 258-01 I Date of Admission: 6/14/2024   Date of Service: 6/17/2024 I Hospital Day: 3    Assessment & Plan   * Sepsis ([facility])  Assessment & Plan  78-year-old female patient with past medical history of COPD, chronic home O2 dependent 3 to 4 L baseline, history of combined CHF with EF of 35%, anemia, CAD, recent hospitalization and 05/2024 due to septic shock requiring ICU level of care, vasopressors secondary to E. coli bacteremia was discharged on 05/15/2024.  Again presented to [facility] emergency room with complaining of generalized weakness, poor p.o. intake, sepsis likely secondary to UTI.  Likely secondary to UTI  Continue ceftriaxone  Complicated by E. coli bacteremia  Pansensitive  Can likely transition to next tomorrow   will need 7 days of antibiotic therapy    COPD (chronic obstructive pulmonary disease) ([facility])  Assessment & Plan  Noted history; + smoker  Follows with Pulmonology OP  Chonically on 4L nc at home.  Not in acute exacerbation  Continue home inhalers      Chronic combined systolic and diastolic CHF (congestive heart failure) ([facility])  Assessment & Plan  Wt Readings from Last 3 Encounters:   06/14/24 56.1 kg (123 lb 10.9 oz)   06/11/24 54.9 kg (121 lb)   06/05/24 55.2 kg (121 lb 9.6 oz)       History of combined CHF with the most recent EF of 35%  Has life vest   Resume Toprol and Entresto      CKD (chronic kidney disease)  Assessment & Plan  Creatinine remains at baseline of approximately 1.5  Remains near baseline  Continue to monitor    Anemia  Assessment & Plan  Present on admission history  anemia.  EGD in 2023 Mild erosive gastritis and Duodenal bulb angiectasia status post APC  Hemoglobin remained stable at approximately 8  No overt bleeding  Continue to monitor    Coronary artery disease of native artery of native heart with stable angina pectoris ([facility])  Assessment & Plan  Continue home  aspirin, statin, beta-blocker  History of Multivessel Disease as noted in ; declined CABG.    Essential hypertension  Assessment & Plan  Patient does have history of hypertension, takes Toprol XL/Entresto at home.  Resume Toprol/Entresto  BP currently controlled will monitor             VTE Pharmacologic Prophylaxis:   Pharmacologic: Heparin  Mechanical VTE Prophylaxis in Place: Yes    Patient Centered Rounds: I have performed bedside rounds with nursing staff today.    Discussions with Specialists or Other Care Team Provider: cm, nursing    Education and Discussions with Family / Patient: pt, son    Time Spent for Care: 30 minutes.  More than 50% of total time spent on counseling and coordination of care as described above.    Current Length of Stay: 3 day(s)    Current Patient Status: Inpatient   Certification Statement: The patient will continue to require additional inpatient hospital stay due to see below    Discharge Plan: Anticipate medically cleared tomorrow will need rehab placement    Code Status: Level 1 - Full Code      Subjective:   Denies chest pain, shortness of breath, cough or fevers, chills    Objective:     Vitals:   Temp (24hrs), Av.2 °F (36.8 °C), Min:97.8 °F (36.6 °C), Max:98.7 °F (37.1 °C)    Temp:  [97.8 °F (36.6 °C)-98.7 °F (37.1 °C)] 98.2 °F (36.8 °C)  HR:  [] 109  Resp:  [18-20] 18  BP: (111-135)/(68-79) 113/68  SpO2:  [99 %] 99 %  Body mass index is 19.37 kg/m².     Input and Output Summary (last 24 hours):     No intake or output data in the 24 hours ending 24    Physical Exam:     Physical Exam  Constitutional:       General: She is not in acute distress.     Appearance: She is well-developed. She is not diaphoretic.   HENT:      Head: Normocephalic and atraumatic.      Nose: Nose normal.      Mouth/Throat:      Mouth: Oropharynx is clear and moist.      Pharynx: No oropharyngeal exudate.   Eyes:      General: No scleral icterus.        Right eye: No discharge.          Left eye: No discharge.      Extraocular Movements: EOM normal.      Conjunctiva/sclera: Conjunctivae normal.   Neck:      Thyroid: No thyromegaly.      Vascular: No JVD.   Cardiovascular:      Rate and Rhythm: Normal rate and regular rhythm.      Heart sounds: Normal heart sounds. No murmur heard.     No friction rub. No gallop.   Pulmonary:      Effort: Pulmonary effort is normal. No respiratory distress.      Breath sounds: Normal breath sounds. No wheezing or rales.   Chest:      Chest wall: No tenderness.   Abdominal:      General: Bowel sounds are normal. There is no distension.      Palpations: Abdomen is soft.      Tenderness: There is no abdominal tenderness. There is no guarding or rebound.   Musculoskeletal:         General: No tenderness, deformity or edema. Normal range of motion.      Cervical back: Normal range of motion and neck supple.   Skin:     General: Skin is warm and dry.      Findings: No erythema or rash.   Neurological:      Mental Status: She is alert. Mental status is at baseline.      Cranial Nerves: No cranial nerve deficit.      Sensory: No sensory deficit.      Motor: No abnormal muscle tone.      Coordination: Coordination normal.   Psychiatric:         Mood and Affect: Mood and affect normal.       (     Additional Data:     Labs:    Results from last 7 days   Lab Units 06/17/24  0635   WBC Thousand/uL 5.45   HEMOGLOBIN g/dL 7.4*   HEMATOCRIT % 24.1*   PLATELETS Thousands/uL 143*   SEGS PCT % 56   LYMPHO PCT % 28   MONO PCT % 13*   EOS PCT % 2     Results from last 7 days   Lab Units 06/17/24  0635 06/15/24  0520 06/14/24  2000   SODIUM mmol/L 138   < > 139   POTASSIUM mmol/L 3.6   < > 4.5   CHLORIDE mmol/L 107   < > 107   CO2 mmol/L 24   < > 26   BUN mg/dL 26*   < > 40*   CREATININE mg/dL 1.50*   < > 1.87*   ANION GAP mmol/L 7   < > 6   CALCIUM mg/dL 8.3*   < > 9.4   ALBUMIN g/dL  --   --  3.6   TOTAL BILIRUBIN mg/dL  --   --  0.41   ALK PHOS U/L  --   --  88   ALT U/L  --    --  3*   AST U/L  --   --  10*   GLUCOSE RANDOM mg/dL 100   < > 114    < > = values in this interval not displayed.     Results from last 7 days   Lab Units 06/14/24 2000   INR  1.03             Results from last 7 days   Lab Units 06/15/24  0520 06/14/24 2000   LACTIC ACID mmol/L  --  0.9   PROCALCITONIN ng/ml 0.50* 0.13           * I Have Reviewed All Lab Data Listed Above.  * Additional Pertinent Lab Tests Reviewed: All Labs Within Last 24 Hours Reviewed    Imaging:    Imaging Reports Reviewed Today Include: na  Imaging Personally Reviewed by Myself Includes:  na    Recent Cultures (last 7 days):     Results from last 7 days   Lab Units 06/14/24 2017 06/14/24 2011 06/14/24 2000   BLOOD CULTURE   --  No Growth at 48 hrs. Escherichia coli*   GRAM STAIN RESULT   --   --  Gram negative rods*   URINE CULTURE  >100,000 cfu/ml Escherichia coli*  --   --        Last 24 Hours Medication List:   Current Facility-Administered Medications   Medication Dose Route Frequency Provider Last Rate    acetaminophen  650 mg Oral Q6H PRN Jaleel Rodrigez MD      albuterol  2.5 mg Nebulization Q6H PRN Jimmy Rosen MD      aspirin  81 mg Oral Daily Domenic YAÑEZ MD      buPROPion  75 mg Oral Daily Domenic YAÑEZ MD      cefTRIAXone  1,000 mg Intravenous Q24H Domenic YAÑEZ MD 1,000 mg (06/17/24 0823)    gabapentin  100 mg Oral HS Domenic YAÑEZ MD      heparin (porcine)  5,000 Units Subcutaneous Q8H [facility] Domenic YAÑEZ MD      metoprolol succinate  100 mg Oral Daily Jimmy Rosen MD      morphine injection  2 mg Intravenous Q6H PRN Jaleel Rodrigez MD      oxyCODONE  5 mg Oral Q6H PRN Jaleel Rodrigez MD      pantoprazole  40 mg Oral Daily Domenic YAÑEZ MD      pravastatin  80 mg Oral QPM Domenic YAÑEZ MD      sacubitril-valsartan  1 tablet Oral BID Jimmy Rosen MD      umeclidinium-vilanterol  1 puff Inhalation Daily Jaleel Rodrigez MD      vilazodone  40 mg Oral Daily With Breakfast Domenic YAÑEZ MD       Zanubrutinib  160 mg Oral BID Jimmy Rosen MD          Today, Patient Was Seen By: Jimmy Rosen MD    ** Please Note: Dictation voice to text software may have been used in the creation of this document. **

## 2024-06-18 PROBLEM — J96.11 CHRONIC RESPIRATORY FAILURE WITH HYPOXIA (HCC): Status: ACTIVE | Noted: 2024-06-18

## 2024-06-18 LAB
ALBUMIN SERPL BCG-MCNC: 2.9 G/DL (ref 3.5–5)
ALP SERPL-CCNC: 83 U/L (ref 34–104)
ALT SERPL W P-5'-P-CCNC: 4 U/L (ref 7–52)
ANION GAP SERPL CALCULATED.3IONS-SCNC: 4 MMOL/L (ref 4–13)
AST SERPL W P-5'-P-CCNC: 11 U/L (ref 13–39)
BACTERIA UR CULT: ABNORMAL
BASOPHILS # BLD AUTO: 0.01 THOUSANDS/ÂΜL (ref 0–0.1)
BASOPHILS NFR BLD AUTO: 0 % (ref 0–1)
BILIRUB DIRECT SERPL-MCNC: 0.07 MG/DL (ref 0–0.2)
BILIRUB SERPL-MCNC: 0.22 MG/DL (ref 0.2–1)
BUN SERPL-MCNC: 27 MG/DL (ref 5–25)
CALCIUM SERPL-MCNC: 8.2 MG/DL (ref 8.4–10.2)
CHLORIDE SERPL-SCNC: 110 MMOL/L (ref 96–108)
CO2 SERPL-SCNC: 26 MMOL/L (ref 21–32)
CREAT SERPL-MCNC: 1.57 MG/DL (ref 0.6–1.3)
EOSINOPHIL # BLD AUTO: 0.23 THOUSAND/ÂΜL (ref 0–0.61)
EOSINOPHIL NFR BLD AUTO: 4 % (ref 0–6)
ERYTHROCYTE [DISTWIDTH] IN BLOOD BY AUTOMATED COUNT: 16.7 % (ref 11.6–15.1)
GFR SERPL CREATININE-BSD FRML MDRD: 31 ML/MIN/1.73SQ M
GLUCOSE SERPL-MCNC: 84 MG/DL (ref 65–140)
HCT VFR BLD AUTO: 22.4 % (ref 34.8–46.1)
HCT VFR BLD AUTO: 24 % (ref 34.8–46.1)
HGB BLD-MCNC: 6.9 G/DL (ref 11.5–15.4)
HGB BLD-MCNC: 7.4 G/DL (ref 11.5–15.4)
IMM GRANULOCYTES # BLD AUTO: 0.01 THOUSAND/UL (ref 0–0.2)
IMM GRANULOCYTES NFR BLD AUTO: 0 % (ref 0–2)
LYMPHOCYTES # BLD AUTO: 1.73 THOUSANDS/ÂΜL (ref 0.6–4.47)
LYMPHOCYTES NFR BLD AUTO: 33 % (ref 14–44)
MCH RBC QN AUTO: 28.8 PG (ref 26.8–34.3)
MCHC RBC AUTO-ENTMCNC: 30.8 G/DL (ref 31.4–37.4)
MCV RBC AUTO: 93 FL (ref 82–98)
MONOCYTES # BLD AUTO: 0.69 THOUSAND/ÂΜL (ref 0.17–1.22)
MONOCYTES NFR BLD AUTO: 13 % (ref 4–12)
NEUTROPHILS # BLD AUTO: 2.54 THOUSANDS/ÂΜL (ref 1.85–7.62)
NEUTS SEG NFR BLD AUTO: 50 % (ref 43–75)
NRBC BLD AUTO-RTO: 0 /100 WBCS
PLATELET # BLD AUTO: 150 THOUSANDS/UL (ref 149–390)
PMV BLD AUTO: 10.8 FL (ref 8.9–12.7)
POTASSIUM SERPL-SCNC: 3.7 MMOL/L (ref 3.5–5.3)
PROCALCITONIN SERPL-MCNC: 0.52 NG/ML
PROT SERPL-MCNC: 7.1 G/DL (ref 6.4–8.4)
RBC # BLD AUTO: 2.4 MILLION/UL (ref 3.81–5.12)
SODIUM SERPL-SCNC: 140 MMOL/L (ref 135–147)
WBC # BLD AUTO: 5.21 THOUSAND/UL (ref 4.31–10.16)

## 2024-06-18 PROCEDURE — 97530 THERAPEUTIC ACTIVITIES: CPT

## 2024-06-18 PROCEDURE — 80048 BASIC METABOLIC PNL TOTAL CA: CPT | Performed by: INTERNAL MEDICINE

## 2024-06-18 PROCEDURE — 99233 SBSQ HOSP IP/OBS HIGH 50: CPT | Performed by: INTERNAL MEDICINE

## 2024-06-18 PROCEDURE — 85014 HEMATOCRIT: CPT | Performed by: INTERNAL MEDICINE

## 2024-06-18 PROCEDURE — 85018 HEMOGLOBIN: CPT | Performed by: INTERNAL MEDICINE

## 2024-06-18 PROCEDURE — 80076 HEPATIC FUNCTION PANEL: CPT | Performed by: INTERNAL MEDICINE

## 2024-06-18 PROCEDURE — 84145 PROCALCITONIN (PCT): CPT | Performed by: INTERNAL MEDICINE

## 2024-06-18 PROCEDURE — 85025 COMPLETE CBC W/AUTO DIFF WBC: CPT | Performed by: INTERNAL MEDICINE

## 2024-06-18 RX ORDER — LANOLIN ALCOHOL/MO/W.PET/CERES
3 CREAM (GRAM) TOPICAL
Status: DISCONTINUED | OUTPATIENT
Start: 2024-06-18 | End: 2024-06-21 | Stop reason: HOSPADM

## 2024-06-18 RX ADMIN — BUPROPION HYDROCHLORIDE 75 MG: 75 TABLET, FILM COATED ORAL at 08:22

## 2024-06-18 RX ADMIN — PRAVASTATIN SODIUM 80 MG: 80 TABLET ORAL at 17:49

## 2024-06-18 RX ADMIN — GABAPENTIN 100 MG: 100 CAPSULE ORAL at 22:42

## 2024-06-18 RX ADMIN — HEPARIN SODIUM 5000 UNITS: 5000 INJECTION INTRAVENOUS; SUBCUTANEOUS at 05:13

## 2024-06-18 RX ADMIN — ASPIRIN 81 MG: 81 TABLET, COATED ORAL at 08:22

## 2024-06-18 RX ADMIN — PANTOPRAZOLE SODIUM 40 MG: 40 TABLET, DELAYED RELEASE ORAL at 08:22

## 2024-06-18 RX ADMIN — METOPROLOL SUCCINATE 100 MG: 100 TABLET, EXTENDED RELEASE ORAL at 08:22

## 2024-06-18 RX ADMIN — ZANUBRUTINIB 160 MG: 80 CAPSULE, GELATIN COATED ORAL at 08:24

## 2024-06-18 RX ADMIN — Medication 3 MG: at 22:43

## 2024-06-18 RX ADMIN — SACUBITRIL AND VALSARTAN 1 TABLET: 24; 26 TABLET, FILM COATED ORAL at 08:23

## 2024-06-18 RX ADMIN — VILAZODONE HYDROCHLORIDE 40 MG: 20 TABLET ORAL at 08:23

## 2024-06-18 RX ADMIN — CEFTRIAXONE SODIUM 1000 MG: 10 INJECTION, POWDER, FOR SOLUTION INTRAVENOUS at 08:41

## 2024-06-18 NOTE — OCCUPATIONAL THERAPY NOTE
Occupational Therapy Treatment Note      Lauren Quintero    6/18/2024    Principal Problem:    Sepsis (HCC)  Active Problems:    Essential hypertension    COPD (chronic obstructive pulmonary disease) ([facility])    Anemia    CKD (chronic kidney disease)    Chronic combined systolic and diastolic CHF (congestive heart failure) (HCC)    Chronic respiratory failure with hypoxia (HCC)      Past Medical History:   Diagnosis Date    Acute congestive heart failure (HCC) 08/27/2021    Anxiety     Cardiac disease     Chest pain 08/27/2021    Chronic pain disorder     COPD (chronic obstructive pulmonary disease) ([facility])     COVID-19 02/15/2024    Depression     Heart disease     Hyperlipidemia     Hypertension     MI (myocardial infarction) ([facility])     MRSA (methicillin resistant Staphylococcus aureus)     Renal disorder     benign kidney tumor       Past Surgical History:   Procedure Laterality Date    APPENDECTOMY      CARDIAC CATHETERIZATION N/A 2/21/2024    Procedure: Cardiac catheterization;  Surgeon: Luke Malagon MD;  Location: [facility];  Service: Cardiology    CARDIAC CATHETERIZATION N/A 2/21/2024    Procedure: Cardiac Coronary Angiogram;  Surgeon: Luke Malagon MD;  Location: [facility];  Service: Cardiology    CARDIAC CATHETERIZATION Left 2/21/2024    Procedure: Cardiac Left Heart Cath;  Surgeon: Luke Malagon MD;  Location: [facility];  Service: Cardiology    ELBOW BURSA SURGERY Left 02/2018    D/T MRSA INFECTION    IR THORACENTESIS  2/27/2024    SPINAL FUSION      L7 L9       06/18/24 1344   OT Last Visit   OT Visit Date 06/18/24   Note Type   Note Type Treatment   Pain Assessment   Pain Assessment Tool 0-10   Pain Score No Pain   Restrictions/Precautions   Weight Bearing Precautions Per Order No   Other Precautions O2;Fall Risk;Bed Alarm;Chair Alarm;Cognitive  (3 L O2 via NC)   Lifestyle   Autonomy Pt reports PLOF was Ind with ADLs, A with IADLs, and (-) driving   Reciprocal  Relationships 2 (local) sons & 1 (non local) son, 10+ grands, 6 grands   ADL   Eating Assistance 6  Modified independent   Eating Deficit Increased time to complete   Grooming Assistance 6  Modified Independent   Grooming Deficit Increased time to complete   UB Bathing Assistance 5  Supervision/Setup   UB Bathing Deficit Supervision/safety;Increased time to complete;Setup   LB Bathing Assistance 5  Supervision/Setup   LB Bathing Deficit Supervision/safety;Increased time to complete;Setup   UB Dressing Assistance 5  Supervision/Setup   UB Dressing Deficit Supervision/safety;Increased time to complete;Setup   LB Dressing Assistance 5  Supervision/Setup   LB Dressing Deficit Supervision/safety;Increased time to complete;Setup   Toileting Assistance  5  Supervision/Setup   Toileting Deficit Increased time to complete;Supervison/safety;Setup   Bed Mobility   Supine to Sit 5  Supervision   Additional items Increased time required   Transfers   Sit to Stand 4  Minimal assistance   Additional items Assist x 1;Increased time required;Verbal cues;Armrests   Stand to Sit 4  Minimal assistance   Additional items Assist x 1;Increased time required;Verbal cues;Armrests   Functional Mobility   Functional Mobility 4  Minimal assistance   Additional items Rolling walker   Cognition   Overall Cognitive Status   (questionable)   Arousal/Participation Alert;Cooperative   Attention Within functional limits   Orientation Level Oriented X4   Memory Decreased recall of recent events   Following Commands Follows one step commands without difficulty   Comments Pt was agreeable to OT session.   Assessment   Assessment Pt participated in skilled OT session today addressing the following interventions ADL retraining with proper body mechanics, Patient / Family Education, Transfer Training, Therapeutic Activity, Back safety education & training, Standing tolerance training, and Sitting/ standing balance task to increase EOB/ OOB sarmad performance  tolerance. Upon arrival, OT completed 2 pt identifiers.  Pt agreeable to OT treatment session, upon arrival patient was found alert, responsive and in no apparent distress. Pt completed bed mobility with S.  Pt completed sit to stand with S.  Pt completed functional mobility with RW and min A. Pt completed dynamic standing balance act to address act alison. Pt was able to participate for . Pt completed xfer to arm chair with RW and min A.   Pt requiring VCs and A throughout and edu in energy conservation, safety, and increasing independence with ADL performance. Pt continues to be functioning below baseline level, occupational performance remains limited secondary to factors listed above and increased risk for falls and injury. From OT standpoint, recommendation at time of d/c would be Level 2 (Mod Resource Intensity).    Pt to benefit from continued Occupational Therapy treatment while in the hospital to address deficits as defined above and maximize level of functional independence with ADLs and functional mobility.   Plan   Treatment Interventions ADL retraining;Functional transfer training;Patient/family training;Equipment evaluation/education;Compensatory technique education;Continued evaluation;Cardiac education;Energy conservation;Activityengagement   Goal Expiration Date 06/30/24   OT Frequency 3-5x/wk   Discharge Recommendation   Rehab Resource Intensity Level, OT II (Moderate Resource Intensity)   AM-PAC Daily Activity Inpatient   Lower Body Dressing 3   Bathing 3   Toileting 3   Upper Body Dressing 3   Grooming 3   Eating 3   Daily Activity Raw Score 18   Daily Activity Standardized Score (Calc for Raw Score >=11) 38.66   AM-PAC Applied Cognition Inpatient   Following a Speech/Presentation 2   Understanding Ordinary Conversation 4   Taking Medications 3   Remembering Where Things Are Placed or Put Away 3   Remembering List of 4-5 Errands 3   Taking Care of Complicated Tasks 2   Applied Cognition Raw Score 17    Applied Cognition Standardized Score 36.52   Barthel Index   Grooming Score 0   Dressing Score 5   Toilet Use Score 5   Transfers (Bed/Chair) Score 5   Mobility (Level Surface) Score 0     Zeenat Santamaria MS OTR/L

## 2024-06-18 NOTE — ASSESSMENT & PLAN NOTE
Wt Readings from Last 3 Encounters:   06/14/24 56.1 kg (123 lb 10.9 oz)   06/11/24 54.9 kg (121 lb)   06/05/24 55.2 kg (121 lb 9.6 oz)       History of combined CHF with the most recent EF of 35%  Has life vest   Appears euvolemic, Toprol and Entresto  Monitor

## 2024-06-18 NOTE — PLAN OF CARE
Problem: OCCUPATIONAL THERAPY ADULT  Goal: Performs self-care activities at highest level of function for planned discharge setting.  See evaluation for individualized goals.  Description: Treatment Interventions: ADL retraining, Functional transfer training, Patient/family training, Equipment evaluation/education, Compensatory technique education, Continued evaluation, Cardiac education, Energy conservation, Activityengagement          See flowsheet documentation for full assessment, interventions and recommendations.   Note: Limitation: Decreased ADL status, Decreased UE strength, Decreased endurance, Decreased self-care trans, Decreased high-level ADLs, Decreased Safe judgement during ADL, Decreased cognition  Prognosis: Good  Assessment: Pt participated in skilled OT session today addressing the following interventions ADL retraining with proper body mechanics, Patient / Family Education, Transfer Training, Therapeutic Activity, Back safety education & training, Standing tolerance training, and Sitting/ standing balance task to increase EOB/ OOB sarmad performance tolerance. Upon arrival, OT completed 2 pt identifiers.  Pt agreeable to OT treatment session, upon arrival patient was found alert, responsive and in no apparent distress. Pt completed bed mobility with S.  Pt completed sit to stand with S.  Pt completed functional mobility with RW and min A. Pt completed dynamic standing balance act to address act alison. Pt was able to participate for . Pt completed xfer to arm chair with RW and min A.   Pt requiring VCs and A throughout and edu in energy conservation, safety, and increasing independence with ADL performance. Pt continues to be functioning below baseline level, occupational performance remains limited secondary to factors listed above and increased risk for falls and injury. From OT standpoint, recommendation at time of d/c would be Level 2 (Mod Resource Intensity).    Pt to benefit from continued  Occupational Therapy treatment while in the hospital to address deficits as defined above and maximize level of functional independence with ADLs and functional mobility.     Rehab Resource Intensity Level, OT: II (Moderate Resource Intensity)

## 2024-06-18 NOTE — PROGRESS NOTES
[facility]  Progress Note  Name: Lauren Quintero I  MRN: [id number]  Unit/Bed#: 2 E 258-01 I Date of Admission: 6/14/2024   Date of Service: 6/18/2024 I Hospital Day: 4    Assessment & Plan   * Sepsis (HCC)  Assessment & Plan  78-year-old female patient with past medical history of COPD, chronic home O2 dependent 3 to 4 L baseline, history of combined CHF with EF of 35%, anemia, CAD, recent hospitalization and 05/2024 due to septic shock requiring ICU level of care, vasopressors secondary to E. coli bacteremia was discharged on 05/15/2024.  Again presented to [facility] emergency room with complaining of generalized weakness, poor p.o. intake, sepsis likely secondary to UTI.  Likely secondary to UTI  Continue ceftriaxone  Complicated by E. coli bacteremia  Pansensitive    Continue ceftriaxone for now, monitor clinically, hemoglobin and CBC, if continues to do well can potentially transition to p.o. alternative soon.    Chronic respiratory failure with hypoxia (HCC)  Assessment & Plan  Chronically on O2  Continue supplementation and monitor    Chronic combined systolic and diastolic CHF (congestive heart failure) (HCC)  Assessment & Plan  Wt Readings from Last 3 Encounters:   06/14/24 56.1 kg (123 lb 10.9 oz)   06/11/24 54.9 kg (121 lb)   06/05/24 55.2 kg (121 lb 9.6 oz)       History of combined CHF with the most recent EF of 35%  Has life vest   Appears euvolemic, Toprol and Entresto  Monitor      CKD (chronic kidney disease)  Assessment & Plan  Creatinine remains at baseline of approximately 1.5  Remains near baseline  Continue to monitor    Anemia  Assessment & Plan  Present on admission history  anemia.  EGD in 2023 Mild erosive gastritis and Duodenal bulb angiectasia status post APC  Hemoglobin trending down 6/18, currently 6.9.  Will repeat this morning.  If persistently less than 7 transfuse.  Patient has signed consent already    COPD (chronic obstructive pulmonary disease)  ([facility])  Assessment & Plan  Noted history; + smoker  Follows with Pulmonology OP  Chonically on 4L nc at home.  Not in acute exacerbation  Continue home inhalers      Essential hypertension  Assessment & Plan  Patient does have history of hypertension, takes Toprol XL/Entresto at home.  Monitor blood pressure               VTE Pharmacologic Prophylaxis:   Pharmacologic: Pharmacologic VTE Prophylaxis contraindicated due to anemia  Mechanical VTE Prophylaxis in Place: Yes    Patient Centered Rounds: I have performed bedside rounds with nursing staff today.    Discussions with Specialists or Other Care Team Provider: Care mgt team    Education and Discussions with Family / Patient: Patient, she did not request me to talk to anyone    Time Spent for Care: 1 hour.  More than 50% of total time spent on counseling and coordination of care as described above.    Current Length of Stay: 4 day(s)    Current Patient Status: Inpatient   Certification Statement: The patient will continue to require additional inpatient hospital stay due to need for close monitoring of medical status, hemoglobin    Discharge Plan: 24-48h    Code Status: Level 1 - Full Code      Subjective:     Patient valuated medically  She feels overall well, denies any bleeding, hemoglobin noted to be low however.    Objective:     Vitals:   Temp (24hrs), Av.8 °F (36.6 °C), Min:97.3 °F (36.3 °C), Max:98.2 °F (36.8 °C)    Temp:  [97.3 °F (36.3 °C)-98.2 °F (36.8 °C)] 98.2 °F (36.8 °C)  HR:  [76-88] 79  Resp:  [17-18] 17  BP: (103-127)/(59-67) 114/64  SpO2:  [97 %-100 %] 100 %  Body mass index is 19.37 kg/m².     Input and Output Summary (last 24 hours):       Intake/Output Summary (Last 24 hours) at 2024 1241  Last data filed at 2024 0900  Gross per 24 hour   Intake 540 ml   Output 500 ml   Net 40 ml       Physical Exam:     Physical Exam  Vitals and nursing note reviewed.   Constitutional:       Appearance: Normal appearance. She is normal weight.       Comments: Chronically ill appearing but acutely nontoxic appearing female in bed, awake   HENT:      Head: Normocephalic and atraumatic.      Right Ear: External ear normal.      Left Ear: External ear normal.      Nose: Nose normal. No congestion.      Mouth/Throat:      Mouth: Mucous membranes are dry.      Pharynx: Oropharynx is clear. No oropharyngeal exudate or posterior oropharyngeal erythema.   Eyes:      General: No scleral icterus.        Right eye: No discharge.         Left eye: No discharge.      Extraocular Movements: Extraocular movements intact.      Conjunctiva/sclera: Conjunctivae normal.      Pupils: Pupils are equal, round, and reactive to light.   Cardiovascular:      Rate and Rhythm: Normal rate and regular rhythm.      Pulses: Normal pulses.      Heart sounds: Normal heart sounds. No murmur heard.     No friction rub. No gallop.   Pulmonary:      Effort: Pulmonary effort is normal. No respiratory distress.      Breath sounds: Normal breath sounds. No stridor. No wheezing, rhonchi or rales.   Chest:      Chest wall: No tenderness.   Abdominal:      General: Abdomen is flat. Bowel sounds are normal. There is no distension.      Palpations: Abdomen is soft. There is no mass.      Tenderness: There is no abdominal tenderness. There is no guarding or rebound.   Musculoskeletal:         General: No swelling, tenderness, deformity or signs of injury. Normal range of motion.      Cervical back: Normal range of motion and neck supple. No rigidity. No muscular tenderness.   Skin:     General: Skin is warm and dry.      Capillary Refill: Capillary refill takes less than 2 seconds.      Coloration: Skin is pale. Skin is not jaundiced.      Findings: No bruising, erythema, lesion or rash.   Neurological:      General: No focal deficit present.      Mental Status: She is alert and oriented to person, place, and time. Mental status is at baseline.      Cranial Nerves: No cranial nerve deficit.       Sensory: No sensory deficit.      Motor: No weakness.      Coordination: Coordination normal.   Psychiatric:         Mood and Affect: Mood normal.         Behavior: Behavior normal.         Thought Content: Thought content normal.         Judgment: Judgment normal.         Additional Data:     Labs:    Results from last 7 days   Lab Units 06/18/24  0857 06/18/24  0542   WBC Thousand/uL  --  5.21   HEMOGLOBIN g/dL 7.4* 6.9*   HEMATOCRIT % 24.0* 22.4*   PLATELETS Thousands/uL  --  150   SEGS PCT %  --  50   LYMPHO PCT %  --  33   MONO PCT %  --  13*   EOS PCT %  --  4     Results from last 7 days   Lab Units 06/18/24  0542   SODIUM mmol/L 140   POTASSIUM mmol/L 3.7   CHLORIDE mmol/L 110*   CO2 mmol/L 26   BUN mg/dL 27*   CREATININE mg/dL 1.57*   ANION GAP mmol/L 4   CALCIUM mg/dL 8.2*   ALBUMIN g/dL 2.9*   TOTAL BILIRUBIN mg/dL 0.22   ALK PHOS U/L 83   ALT U/L 4*   AST U/L 11*   GLUCOSE RANDOM mg/dL 84     Results from last 7 days   Lab Units 06/14/24 2000   INR  1.03             Results from last 7 days   Lab Units 06/18/24  0542 06/15/24  0520 06/14/24 2000   LACTIC ACID mmol/L  --   --  0.9   PROCALCITONIN ng/ml 0.52* 0.50* 0.13           * I Have Reviewed All Lab Data Listed Above.  * Additional Pertinent Lab Tests Reviewed: All Labs For Current Hospital Admission Reviewed        Recent Cultures (last 7 days):     Results from last 7 days   Lab Units 06/14/24 2017 06/14/24 2011 06/14/24 2000   BLOOD CULTURE   --  No Growth at 72 hrs. Escherichia coli*   GRAM STAIN RESULT   --   --  Gram negative rods*   URINE CULTURE  >100,000 cfu/ml Escherichia coli*  --   --        Last 24 Hours Medication List:   Current Facility-Administered Medications   Medication Dose Route Frequency Provider Last Rate    acetaminophen  650 mg Oral Q6H PRN Jaleel Rodrigez MD      albuterol  2.5 mg Nebulization Q6H PRN Jimmy Rosen MD      aspirin  81 mg Oral Daily Domenic YAÑEZ MD      buPROPion  75 mg Oral Daily Domenic Wylie  MD PARI      cefTRIAXone  1,000 mg Intravenous Q24H Domenic YAÑEZ MD 1,000 mg (06/18/24 0841)    gabapentin  100 mg Oral HS Domenic YAÑEZ MD      heparin (porcine)  5,000 Units Subcutaneous Q8H [facility] Domenic YAÑEZ MD      metoprolol succinate  100 mg Oral Daily Jimmy Rosen MD      morphine injection  2 mg Intravenous Q6H PRN Jaleel Rodrigez MD      oxyCODONE  5 mg Oral Q6H PRN Jaleel Rodrigez MD      pantoprazole  40 mg Oral Daily Domenic YAÑEZ MD      pravastatin  80 mg Oral QPM Domenic YAÑEZ MD      sacubitril-valsartan  1 tablet Oral BID Jimmy Rosen MD      umeclidinium-vilanterol  1 puff Inhalation Daily Jaleel Rodrigez MD      vilazodone  40 mg Oral Daily With Breakfast Domenic YAÑEZ MD      Zanubrutinib  160 mg Oral BID Jimmy Rosen MD          Today, Patient Was Seen By: Kenny Alan MD    ** Please Note: Dictation voice to text software may have been used in the creation of this document. **

## 2024-06-18 NOTE — CASE MANAGEMENT
Case Management Progress Note    Patient name Lauren Quintero  Location 2 EAST 258/2 E 258-01 MRN [id number]  : 1945 Date 2024       LOS (days): 4  Geometric Mean LOS (GMLOS) (days): 3.6  Days to GMLOS:-0.2        OBJECTIVE:     Current admission status: Inpatient  Preferred Pharmacy:   [facility] #[id number] - [place] - [address]  [address]  [place] [address]  Phone: [phone] Fax: [phone]    [facility] [place] [place] [facility] [address]  [address]  [address]  [place] [address]  Phone: [phone] Fax: [phone]    [place], [place] - [address] [facility]  [address] [place] [address]  Phone: [phone] Fax: [phone]    [facility] [place] - [address]  [address] [facility] [place] [address]  Phone: [phone] Fax: [phone]    Primary Care Provider: Starla Bateman MD  Primary Insurance: MEDICARE  Secondary Insurance:     PROGRESS NOTE:  Patient reviewed during Interdisciplinary Rounds with SLIM today.  Anticipated for d/c in 24hrs.  Patient declining STR referral.  Preference is d/c home w/ HHC.  [facility] reserved and AVS updated.  Patient has CHF & COPD dx, OP CM referral sent via In Basket due to prior referral despite out of network PCP.  CM will continue to follow for needs and planning.

## 2024-06-18 NOTE — ASSESSMENT & PLAN NOTE
78-year-old female patient with past medical history of COPD, chronic home O2 dependent 3 to 4 L baseline, history of combined CHF with EF of 35%, anemia, CAD, recent hospitalization and 05/2024 due to septic shock requiring ICU level of care, vasopressors secondary to E. coli bacteremia was discharged on 05/15/2024.  Again presented to [facility] emergency room with complaining of generalized weakness, poor p.o. intake, sepsis likely secondary to UTI.  Likely secondary to UTI  Continue ceftriaxone  Complicated by E. coli bacteremia  Pansensitive    Continue ceftriaxone for now, monitor clinically, hemoglobin and CBC, if continues to do well can potentially transition to p.o. alternative soon.

## 2024-06-18 NOTE — ASSESSMENT & PLAN NOTE
Present on admission history  anemia.  EGD in 2023 Mild erosive gastritis and Duodenal bulb angiectasia status post APC  Hemoglobin trending down 6/18, currently 6.9.  Will repeat this morning.  If persistently less than 7 transfuse.  Patient has signed consent already

## 2024-06-18 NOTE — PLAN OF CARE
Problem: Prexisting or High Potential for Compromised Skin Integrity  Goal: Skin integrity is maintained or improved  Description: INTERVENTIONS:  - Identify patients at risk for skin breakdown  - Assess and monitor skin integrity  - Assess and monitor nutrition and hydration status  - Monitor labs   - Assess for incontinence   - Turn and reposition patient  - Assist with mobility/ambulation  - Relieve pressure over bony prominences  - Avoid friction and shearing  - Provide appropriate hygiene as needed including keeping skin clean and dry  - Evaluate need for skin moisturizer/barrier cream  - Collaborate with interdisciplinary team   - Patient/family teaching  - Consider wound care consult   Outcome: Progressing     Problem: PAIN - ADULT  Goal: Verbalizes/displays adequate comfort level or baseline comfort level  Description: Interventions:  - Encourage patient to monitor pain and request assistance  - Assess pain using appropriate pain scale  - Administer analgesics based on type and severity of pain and evaluate response  - Implement non-pharmacological measures as appropriate and evaluate response  - Consider cultural and social influences on pain and pain management  - Notify physician/advanced practitioner if interventions unsuccessful or patient reports new pain  Outcome: Progressing     Problem: INFECTION - ADULT  Goal: Absence or prevention of progression during hospitalization  Description: INTERVENTIONS:  - Assess and monitor for signs and symptoms of infection  - Monitor lab/diagnostic results  - Monitor all insertion sites, i.e. indwelling lines, tubes, and drains  - Monitor endotracheal if appropriate and nasal secretions for changes in amount and color  - Reading appropriate cooling/warming therapies per order  - Administer medications as ordered  - Instruct and encourage patient and family to use good hand hygiene technique  - Identify and instruct in appropriate isolation precautions for  identified infection/condition  Outcome: Progressing  Goal: Absence of fever/infection during neutropenic period  Description: INTERVENTIONS:  - Monitor WBC    Outcome: Progressing     Problem: SAFETY ADULT  Goal: Patient will remain free of falls  Description: INTERVENTIONS:  - Educate patient/family on patient safety including physical limitations  - Instruct patient to call for assistance with activity   - Consult OT/PT to assist with strengthening/mobility   - Keep Call bell within reach  - Keep bed low and locked with side rails adjusted as appropriate  - Keep care items and personal belongings within reach  - Initiate and maintain comfort rounds  - Make Fall Risk Sign visible to staff  - Offer Toileting every 2 Hours, in advance of need  - Initiate/Maintain bed alarm  - Obtain necessary fall risk management equipment:   - Apply yellow socks and bracelet for high fall risk patients  - Consider moving patient to room near nurses station  Outcome: Progressing  Goal: Maintain or return to baseline ADL function  Description: INTERVENTIONS:  -  Assess patient's ability to carry out ADLs; assess patient's baseline for ADL function and identify physical deficits which impact ability to perform ADLs (bathing, care of mouth/teeth, toileting, grooming, dressing, etc.)  - Assess/evaluate cause of self-care deficits   - Assess range of motion  - Assess patient's mobility; develop plan if impaired  - Assess patient's need for assistive devices and provide as appropriate  - Encourage maximum independence but intervene and supervise when necessary  - Involve family in performance of ADLs  - Assess for home care needs following discharge   - Consider OT consult to assist with ADL evaluation and planning for discharge  - Provide patient education as appropriate  Outcome: Progressing  Goal: Maintains/Returns to pre admission functional level  Description: INTERVENTIONS:  - Perform AM-PAC 6 Click Basic Mobility/ Daily Activity  assessment daily.  - Set and communicate daily mobility goal to care team and patient/family/caregiver.   - Collaborate with rehabilitation services on mobility goals if consulted  - Perform Range of Motion 3 times a day.  - Reposition patient every 2 hours.  - Dangle patient 3 times a day  - Stand patient 3 times a day  - Ambulate patient 3 times a day  - Out of bed to chair 3 times a day   - Out of bed for meals 3 times a day  - Out of bed for toileting  - Record patient progress and toleration of activity level   Outcome: Progressing     Problem: DISCHARGE PLANNING  Goal: Discharge to home or other facility with appropriate resources  Description: INTERVENTIONS:  - Identify barriers to discharge w/patient and caregiver  - Arrange for needed discharge resources and transportation as appropriate  - Identify discharge learning needs (meds, wound care, etc.)  - Arrange for interpretive services to assist at discharge as needed  - Refer to Case Management Department for coordinating discharge planning if the patient needs post-hospital services based on physician/advanced practitioner order or complex needs related to functional status, cognitive ability, or social support system  Outcome: Progressing     Problem: Knowledge Deficit  Goal: Patient/family/caregiver demonstrates understanding of disease process, treatment plan, medications, and discharge instructions  Description: Complete learning assessment and assess knowledge base.  Interventions:  - Provide teaching at level of understanding  - Provide teaching via preferred learning methods  Outcome: Progressing

## 2024-06-18 NOTE — ASSESSMENT & PLAN NOTE
Patient does have history of hypertension, takes Toprol XL/Entresto at home.  Monitor blood pressure

## 2024-06-19 LAB
ABO GROUP BLD: NORMAL
ALBUMIN SERPL BCG-MCNC: 3 G/DL (ref 3.5–5)
ALP SERPL-CCNC: 81 U/L (ref 34–104)
ALT SERPL W P-5'-P-CCNC: 4 U/L (ref 7–52)
ANION GAP SERPL CALCULATED.3IONS-SCNC: 3 MMOL/L (ref 4–13)
AST SERPL W P-5'-P-CCNC: 11 U/L (ref 13–39)
BACTERIA BLD CULT: NORMAL
BASOPHILS # BLD AUTO: 0.01 THOUSANDS/ÂΜL (ref 0–0.1)
BASOPHILS NFR BLD AUTO: 0 % (ref 0–1)
BILIRUB SERPL-MCNC: 0.24 MG/DL (ref 0.2–1)
BLD GP AB SCN SERPL QL: NEGATIVE
BUN SERPL-MCNC: 29 MG/DL (ref 5–25)
CALCIUM ALBUM COR SERPL-MCNC: 9.6 MG/DL (ref 8.3–10.1)
CALCIUM SERPL-MCNC: 8.8 MG/DL (ref 8.4–10.2)
CHLORIDE SERPL-SCNC: 110 MMOL/L (ref 96–108)
CO2 SERPL-SCNC: 28 MMOL/L (ref 21–32)
CREAT SERPL-MCNC: 1.48 MG/DL (ref 0.6–1.3)
EOSINOPHIL # BLD AUTO: 0.2 THOUSAND/ÂΜL (ref 0–0.61)
EOSINOPHIL NFR BLD AUTO: 4 % (ref 0–6)
ERYTHROCYTE [DISTWIDTH] IN BLOOD BY AUTOMATED COUNT: 16.9 % (ref 11.6–15.1)
FERRITIN SERPL-MCNC: 285 NG/ML (ref 11–307)
GFR SERPL CREATININE-BSD FRML MDRD: 33 ML/MIN/1.73SQ M
GLUCOSE SERPL-MCNC: 83 MG/DL (ref 65–140)
HCT VFR BLD AUTO: 22.3 % (ref 34.8–46.1)
HCT VFR BLD AUTO: 24.7 % (ref 34.8–46.1)
HGB BLD-MCNC: 6.8 G/DL (ref 11.5–15.4)
HGB BLD-MCNC: 7.6 G/DL (ref 11.5–15.4)
IMM GRANULOCYTES # BLD AUTO: 0.03 THOUSAND/UL (ref 0–0.2)
IMM GRANULOCYTES NFR BLD AUTO: 1 % (ref 0–2)
IRON SATN MFR SERPL: 62 % (ref 15–50)
IRON SERPL-MCNC: 111 UG/DL (ref 50–212)
LYMPHOCYTES # BLD AUTO: 1.99 THOUSANDS/ÂΜL (ref 0.6–4.47)
LYMPHOCYTES NFR BLD AUTO: 39 % (ref 14–44)
MCH RBC QN AUTO: 28.7 PG (ref 26.8–34.3)
MCHC RBC AUTO-ENTMCNC: 30.5 G/DL (ref 31.4–37.4)
MCV RBC AUTO: 94 FL (ref 82–98)
MONOCYTES # BLD AUTO: 0.51 THOUSAND/ÂΜL (ref 0.17–1.22)
MONOCYTES NFR BLD AUTO: 10 % (ref 4–12)
NEUTROPHILS # BLD AUTO: 2.39 THOUSANDS/ÂΜL (ref 1.85–7.62)
NEUTS SEG NFR BLD AUTO: 46 % (ref 43–75)
NRBC BLD AUTO-RTO: 0 /100 WBCS
PLATELET # BLD AUTO: 167 THOUSANDS/UL (ref 149–390)
PMV BLD AUTO: 11 FL (ref 8.9–12.7)
POTASSIUM SERPL-SCNC: 3.8 MMOL/L (ref 3.5–5.3)
PROT SERPL-MCNC: 7.6 G/DL (ref 6.4–8.4)
RBC # BLD AUTO: 2.37 MILLION/UL (ref 3.81–5.12)
RH BLD: NEGATIVE
SODIUM SERPL-SCNC: 141 MMOL/L (ref 135–147)
SPECIMEN EXPIRATION DATE: NORMAL
TIBC SERPL-MCNC: 180 UG/DL (ref 250–450)
UIBC SERPL-MCNC: 69 UG/DL (ref 155–355)
WBC # BLD AUTO: 5.13 THOUSAND/UL (ref 4.31–10.16)

## 2024-06-19 PROCEDURE — 85018 HEMOGLOBIN: CPT | Performed by: INTERNAL MEDICINE

## 2024-06-19 PROCEDURE — 82728 ASSAY OF FERRITIN: CPT | Performed by: INTERNAL MEDICINE

## 2024-06-19 PROCEDURE — 83540 ASSAY OF IRON: CPT | Performed by: INTERNAL MEDICINE

## 2024-06-19 PROCEDURE — 86923 COMPATIBILITY TEST ELECTRIC: CPT

## 2024-06-19 PROCEDURE — 86850 RBC ANTIBODY SCREEN: CPT | Performed by: INTERNAL MEDICINE

## 2024-06-19 PROCEDURE — 86901 BLOOD TYPING SEROLOGIC RH(D): CPT | Performed by: INTERNAL MEDICINE

## 2024-06-19 PROCEDURE — 85025 COMPLETE CBC W/AUTO DIFF WBC: CPT | Performed by: INTERNAL MEDICINE

## 2024-06-19 PROCEDURE — 80053 COMPREHEN METABOLIC PANEL: CPT | Performed by: INTERNAL MEDICINE

## 2024-06-19 PROCEDURE — 99233 SBSQ HOSP IP/OBS HIGH 50: CPT | Performed by: INTERNAL MEDICINE

## 2024-06-19 PROCEDURE — P9016 RBC LEUKOCYTES REDUCED: HCPCS

## 2024-06-19 PROCEDURE — 86900 BLOOD TYPING SEROLOGIC ABO: CPT | Performed by: INTERNAL MEDICINE

## 2024-06-19 PROCEDURE — 83550 IRON BINDING TEST: CPT | Performed by: INTERNAL MEDICINE

## 2024-06-19 PROCEDURE — 85014 HEMATOCRIT: CPT | Performed by: INTERNAL MEDICINE

## 2024-06-19 RX ADMIN — PANTOPRAZOLE SODIUM 40 MG: 40 TABLET, DELAYED RELEASE ORAL at 08:09

## 2024-06-19 RX ADMIN — SACUBITRIL AND VALSARTAN 1 TABLET: 24; 26 TABLET, FILM COATED ORAL at 17:30

## 2024-06-19 RX ADMIN — Medication 3 MG: at 20:52

## 2024-06-19 RX ADMIN — VILAZODONE HYDROCHLORIDE 40 MG: 20 TABLET ORAL at 08:15

## 2024-06-19 RX ADMIN — GABAPENTIN 100 MG: 100 CAPSULE ORAL at 20:52

## 2024-06-19 RX ADMIN — CEFTRIAXONE SODIUM 1000 MG: 10 INJECTION, POWDER, FOR SOLUTION INTRAVENOUS at 08:14

## 2024-06-19 RX ADMIN — ASPIRIN 81 MG: 81 TABLET, COATED ORAL at 08:09

## 2024-06-19 RX ADMIN — PRAVASTATIN SODIUM 80 MG: 80 TABLET ORAL at 17:31

## 2024-06-19 RX ADMIN — BUPROPION HYDROCHLORIDE 75 MG: 75 TABLET, FILM COATED ORAL at 08:09

## 2024-06-19 RX ADMIN — SACUBITRIL AND VALSARTAN 1 TABLET: 24; 26 TABLET, FILM COATED ORAL at 08:28

## 2024-06-19 RX ADMIN — METOPROLOL SUCCINATE 100 MG: 100 TABLET, EXTENDED RELEASE ORAL at 08:09

## 2024-06-19 NOTE — PROGRESS NOTES
[facility]  Progress Note  Name: Lauren Quintero I  MRN: [id number]  Unit/Bed#: 2 E 258-01 I Date of Admission: 6/14/2024   Date of Service: 6/19/2024 I Hospital Day: 5    Assessment & Plan   * Sepsis (HCC)  Assessment & Plan  78-year-old female patient with past medical history of COPD, chronic home O2 dependent 3 to 4 L baseline, history of combined CHF with EF of 35%, anemia, CAD, recent hospitalization and 05/2024 due to septic shock requiring ICU level of care, vasopressors secondary to E. coli bacteremia was discharged on 05/15/2024.  Again presented to [facility] emergency room with complaining of generalized weakness, poor p.o. intake, sepsis likely secondary to UTI.  Likely secondary to UTI  Continue ceftriaxone  Complicated by E. coli bacteremia  Pansensitive    Continue ceftriaxone for now, monitor clinically, hemoglobin and CBC, if continues to do well can potentially transition to p.o. alternative soon.    Chronic respiratory failure with hypoxia (HCC)  Assessment & Plan  Chronically on O2  Continue supplementation and monitor    Anemia  Assessment & Plan  Present on admission history  anemia.  EGD in 2023 Mild erosive gastritis and Duodenal bulb angiectasia status post APC  Hemoglobin trending down 6/18, currently 6.9, again 6.8 on 6/19 will order 1U PRBc. She denies dark stools but if Hb does not  will reconsider GI eval.    Chronic combined systolic and diastolic CHF (congestive heart failure) (HCC)  Assessment & Plan  Wt Readings from Last 3 Encounters:   06/14/24 56.1 kg (123 lb 10.9 oz)   06/11/24 54.9 kg (121 lb)   06/05/24 55.2 kg (121 lb 9.6 oz)       History of combined CHF with the most recent EF of 35%  Has life vest   Appears euvolemic, Toprol and Entresto  Monitor      CKD (chronic kidney disease)  Assessment & Plan  Creatinine remains at baseline of approximately 1.5  Remains near baseline  Continue to monitor    COPD (chronic obstructive pulmonary disease)  (Formerly [facility])  Assessment & Plan  Noted history; + smoker  Follows with Pulmonology OP  Chonically on 4L nc at home.  Not in acute exacerbation  Continue home inhalers               VTE Pharmacologic Prophylaxis:   Pharmacologic: Pharmacologic VTE Prophylaxis contraindicated due to anemia  Mechanical VTE Prophylaxis in Place: Yes    Patient Centered Rounds: I have performed bedside rounds with nursing staff today.    Discussions with Specialists or Other Care Team Provider: Discussed with care management team    Education and Discussions with Family / Patient: Patient    Time Spent for Care: 1 hour.  More than 50% of total time spent on counseling and coordination of care as described above.    Current Length of Stay: 5 day(s)    Current Patient Status: Inpatient   Certification Statement: The patient will continue to require additional inpatient hospital stay due to anemia    Discharge Plan: 24-48h pending Hb    Code Status: Level 1 - Full Code      Subjective:     Patient evaluated this AM  Feels well  Denies any CP, nausea, vomiting  Hb 6.8 but denies bleeding.    Objective:     Vitals:   Temp (24hrs), Av.9 °F (36.6 °C), Min:97.7 °F (36.5 °C), Max:98.2 °F (36.8 °C)    Temp:  [97.7 °F (36.5 °C)-98.2 °F (36.8 °C)] 97.7 °F (36.5 °C)  HR:  [77-83] 77  Resp:  [17] 17  BP: (105-133)/(56-74) 129/74  SpO2:  [98 %-100 %] 98 %  Body mass index is 19.37 kg/m².     Input and Output Summary (last 24 hours):     No intake or output data in the 24 hours ending 24 0911    Physical Exam:     Physical Exam  Vitals and nursing note reviewed.   Constitutional:       Appearance: Normal appearance. She is normal weight.      Comments: Female in bed, awake   HENT:      Head: Normocephalic and atraumatic.      Right Ear: External ear normal.      Left Ear: External ear normal.      Nose: Nose normal. No congestion.      Mouth/Throat:      Mouth: Mucous membranes are dry.      Pharynx: Oropharynx is clear. No oropharyngeal exudate or  posterior oropharyngeal erythema.   Eyes:      General: No scleral icterus.        Right eye: No discharge.         Left eye: No discharge.      Extraocular Movements: Extraocular movements intact.      Conjunctiva/sclera: Conjunctivae normal.      Pupils: Pupils are equal, round, and reactive to light.   Cardiovascular:      Rate and Rhythm: Normal rate and regular rhythm.      Pulses: Normal pulses.      Heart sounds: Normal heart sounds. No murmur heard.     No friction rub. No gallop.   Pulmonary:      Effort: Pulmonary effort is normal. No respiratory distress.      Breath sounds: Normal breath sounds. No stridor. No wheezing, rhonchi or rales.   Chest:      Chest wall: No tenderness.   Abdominal:      General: Abdomen is flat. Bowel sounds are normal. There is no distension.      Palpations: Abdomen is soft. There is no mass.      Tenderness: There is no abdominal tenderness. There is no guarding or rebound.   Musculoskeletal:         General: No swelling, tenderness, deformity or signs of injury. Normal range of motion.      Cervical back: Normal range of motion and neck supple. No rigidity. No muscular tenderness.   Skin:     General: Skin is warm and dry.      Capillary Refill: Capillary refill takes less than 2 seconds.      Coloration: Skin is pale. Skin is not jaundiced.      Findings: No bruising, erythema, lesion or rash.   Neurological:      General: No focal deficit present.      Mental Status: She is alert and oriented to person, place, and time. Mental status is at baseline.      Cranial Nerves: No cranial nerve deficit.      Sensory: No sensory deficit.      Motor: No weakness.      Coordination: Coordination normal.   Psychiatric:         Mood and Affect: Mood normal.         Behavior: Behavior normal.         Thought Content: Thought content normal.         Judgment: Judgment normal.           Additional Data:     Labs:    Results from last 7 days   Lab Units 06/19/24  0525   WBC Thousand/uL  5.13   HEMOGLOBIN g/dL 6.8*   HEMATOCRIT % 22.3*   PLATELETS Thousands/uL 167   SEGS PCT % 46   LYMPHO PCT % 39   MONO PCT % 10   EOS PCT % 4     Results from last 7 days   Lab Units 06/19/24  0525   SODIUM mmol/L 141   POTASSIUM mmol/L 3.8   CHLORIDE mmol/L 110*   CO2 mmol/L 28   BUN mg/dL 29*   CREATININE mg/dL 1.48*   ANION GAP mmol/L 3*   CALCIUM mg/dL 8.8   ALBUMIN g/dL 3.0*   TOTAL BILIRUBIN mg/dL 0.24   ALK PHOS U/L 81   ALT U/L 4*   AST U/L 11*   GLUCOSE RANDOM mg/dL 83     Results from last 7 days   Lab Units 06/14/24 2000   INR  1.03             Results from last 7 days   Lab Units 06/18/24  0542 06/15/24  0520 06/14/24 2000   LACTIC ACID mmol/L  --   --  0.9   PROCALCITONIN ng/ml 0.52* 0.50* 0.13           * I Have Reviewed All Lab Data Listed Above.  * Additional Pertinent Lab Tests Reviewed: All Labs For Current Hospital Admission Reviewed      Recent Cultures (last 7 days):     Results from last 7 days   Lab Units 06/14/24  2017 06/14/24 2011 06/14/24 2000   BLOOD CULTURE   --  No Growth After 4 Days. Escherichia coli*   GRAM STAIN RESULT   --   --  Gram negative rods*   URINE CULTURE  >100,000 cfu/ml Escherichia coli*  --   --        Last 24 Hours Medication List:   Current Facility-Administered Medications   Medication Dose Route Frequency Provider Last Rate    acetaminophen  650 mg Oral Q6H PRN Jaleel Rodrigez MD      albuterol  2.5 mg Nebulization Q6H PRN Jimmy Rosen MD      aspirin  81 mg Oral Daily Domenic YAÑEZ MD      buPROPion  75 mg Oral Daily Domenic YAÑEZ MD      cefTRIAXone  1,000 mg Intravenous Q24H Domenic YAÑEZ MD 1,000 mg (06/19/24 0814)    gabapentin  100 mg Oral HS Domenic YAÑEZ MD      melatonin  3 mg Oral HS Royal Prescott MD      metoprolol succinate  100 mg Oral Daily Jimmy Rosen MD      morphine injection  2 mg Intravenous Q6H PRN Jaleel Rodrigez MD      oxyCODONE  5 mg Oral Q6H PRN Jaleel Rodrigez MD      pantoprazole  40 mg Oral Daily Domenic  Dejan YAÑEZ MD      pravastatin  80 mg Oral QPM Domenic YAÑEZ MD      sacubitril-valsartan  1 tablet Oral BID Jimmy Rosen MD      umeclidinium-vilanterol  1 puff Inhalation Daily Jaleel Rodrigez MD      vilazodone  40 mg Oral Daily With Breakfast Domenic YAÑEZ MD      Zanubrutinib  160 mg Oral BID Jimmy Rosen MD          Today, Patient Was Seen By: Kenny Alan MD    ** Please Note: Dictation voice to text software may have been used in the creation of this document. **

## 2024-06-19 NOTE — ASSESSMENT & PLAN NOTE
Present on admission history  anemia.  EGD in 2023 Mild erosive gastritis and Duodenal bulb angiectasia status post APC  Hemoglobin trending down 6/18, currently 6.9, again 6.8 on 6/19 will order 1U PRBc. She denies dark stools but if Hb does not  will reconsider GI eval.

## 2024-06-19 NOTE — PROGRESS NOTES
Patient:    MRN:  [id number]    [place] Request ID:  [id number]    Level of care reserved:  Home Health Agency    Partner Reserved:  [facility], [place] [address] [phone]    Clinical needs requested:    Geography searched:  [id number]    Start of Service:    Request sent:  12:19pm EDT on 6/17/2024 by Mireya Vanessa    Partner reserved:  1:20pm EDT on 6/17/2024 by Mireya Vanessa    Choice list shared:

## 2024-06-19 NOTE — CASE MANAGEMENT
Case Management Discharge Planning Note    Patient name Lauren Quintero  Location 2 EAST 258/2 E 258-01 MRN [id number]  : 1945 Date 2024       Current Admission Date: 2024  Current Admission Diagnosis:Sepsis (Formerly [facility])   Patient Active Problem List    Diagnosis Date Noted Date Diagnosed    Chronic respiratory failure with hypoxia (Formerly [facility]) 2024     Acute kidney injury superimposed on chronic kidney disease  (Formerly [facility]) 2024     Sepsis (Formerly [facility]) 2024     DALRING (acute kidney injury) (Formerly [facility]) 2024     Diarrhea 2024     Pain 04/15/2024     Loose stools 2024     Insomnia 2024     Non-ST elevation MI (NSTEMI) (Formerly [facility]) 2024     Abnormal chest x-ray 2024     Chronic combined systolic and diastolic CHF (congestive heart failure) (Formerly [facility]) 2024     Elevated troponin 2024     CKD (chronic kidney disease) 2024     Dizziness 2023     Social problem 2023     Diverticulitis 10/29/2023     Moderate protein-calorie malnutrition ([facility]) 2023     BRBPR (bright red blood per rectum) 2023     Generalized weakness 2023     Staring episodes 2023     Waldenstrom macroglobulinemia ([facility]ly [facility]) 2023     Severe protein-calorie malnutrition ([facility]ly [facility]) 2021     Positive blood culture 2021     COPD (chronic obstructive pulmonary disease) (Formerly [facility]) 2021     Anemia 2021     Abnormal computed tomography angiography (CTA) 2021     Fatigue 2021     Major depressive disorder, recurrent episode, moderate ([facility]) 2019     Flank pain 2019     Coronary artery disease of native artery of native heart with stable angina pectoris (Formerly [facility]) 2018     Acute on chronic respiratory failure with hypoxia ([facility]ly [facility]) 2018     Ambulatory dysfunction 2018     Essential hypertension 2018     Tobacco abuse 2018       LOS (days): 5  Geometric Mean LOS (GMLOS) (days): 3.6  Days to GMLOS:-1.2     OBJECTIVE:  Risk of Unplanned  Readmission Score: 38.62      Current admission status: Inpatient   Preferred Pharmacy:   [id number] [place] - [address]  [address]  [facility] [place] [address]  Phone: [phone] Fax: [phone]    [facility] [place] - [address]  [address]  [address]  [place] [address]  Phone: [phone] Fax: [phone]    [place], [place] - [address]  [address] [facility] [address]  Phone: [phone] Fax: [phone]    [facility] [place] - [address]  [address]  [place] [address]  Phone: [phone] Fax: [phone]    Primary Care Provider: Starla Bateman MD  Primary Insurance: MEDICARE  Secondary Insurance:     DISCHARGE DETAILS:    Discharge planning discussed with:: Patient, son and DIL at bedside.  [place] of Choice: Yes  Comments - Freedom of Choice: FOC maintained - CM reviewed DCP.  Confirmed [facility] is reserved in [facility] for ADELINE.  CM sent messaged with update on anticipated d/c and contacted liaison, Fannie, for confirmation.  Fannie to reach out to son to confirm ADELINE.  Patient anticipated for d/c in 24-48hrs pending hgb.  Son reports that call was to be placed to patient's heme-onc provider regarding hospital admission.  Son is concerned that patient will not be able to get her cancer medications without an in-person appt.  CM forwarded message to [facility] for follow-up.  CM contacted family/caregiver?: Yes (Present at bedside.)  Were Treatment Team discharge recommendations reviewed with patient/caregiver?: Yes  Did patient/caregiver verbalize understanding of patient care needs?: Yes  Were patient/caregiver advised of the risks associated with not following Treatment Team discharge recommendations?: Yes    Contacts  Patient Contacts: Imer (son)  Relationship to Patient:: Family  Contact Method: In Person  Reason/Outcome: Continuity of Care, Discharge  Planning    Requested Home Health Care         Is the patient interested in HHC at discharge?: Yes  Home Health Discipline requested:: Home Health Aide, Nursing, Occupational Therapy, Physical Therapy  Home Health Agency Name:: [facility]  HHA External Referral Reason (only applicable if external HHA name selected): Patient has established relationship with provider  Home Health Follow-Up Provider:: PCP (Starla Bateman MD)  Home Health Services Needed:: COPD Management, Evaluate Functional Status and Safety, Gait/ADL Training, Heart Failure Management, Oxygen Via Nasal Cannula, Strengthening/Theraputic Exercises to Improve Function  Oxygen LPM Ordered (if applicable based on home health services needed):: 4 LPM  Homebound Criteria Met:: Requires the Assistance of Another Person for Safe Ambulation or to Leave the Home, Uses an Assist Device (i.e. cane, walker, etc)  Supporting Clincal Findings:: Fatigues Easliy in Short Distances, Dyspnea with Exertion, Requires Oxygen, Limited Endurance    DME Referral Provided  Referral made for DME?: No    Other Referral/Resources/Interventions Provided:  Interventions: HHC  Referral Comments: [facility] confirmed, reserved, & AVS updated.  Programs:: COPD, CHF    Discharge Destination Plan:: Home with Home Health Care  Transport at Discharge : Family

## 2024-06-19 NOTE — QUICK NOTE
Hb 6.8 again  Patient already agreed with blood products previously after risk vs benefit discussion during this admission    1U PRBc ordered

## 2024-06-19 NOTE — PLAN OF CARE
Problem: Prexisting or High Potential for Compromised Skin Integrity  Goal: Skin integrity is maintained or improved  Description: INTERVENTIONS:  - Identify patients at risk for skin breakdown  - Assess and monitor skin integrity  - Assess and monitor nutrition and hydration status  - Monitor labs   - Assess for incontinence   - Turn and reposition patient  - Assist with mobility/ambulation  - Relieve pressure over bony prominences  - Avoid friction and shearing  - Provide appropriate hygiene as needed including keeping skin clean and dry  - Evaluate need for skin moisturizer/barrier cream  - Collaborate with interdisciplinary team   - Patient/family teaching  - Consider wound care consult   Outcome: Progressing     Problem: PAIN - ADULT  Goal: Verbalizes/displays adequate comfort level or baseline comfort level  Description: Interventions:  - Encourage patient to monitor pain and request assistance  - Assess pain using appropriate pain scale  - Administer analgesics based on type and severity of pain and evaluate response  - Implement non-pharmacological measures as appropriate and evaluate response  - Consider cultural and social influences on pain and pain management  - Notify physician/advanced practitioner if interventions unsuccessful or patient reports new pain  Outcome: Progressing     Problem: INFECTION - ADULT  Goal: Absence or prevention of progression during hospitalization  Description: INTERVENTIONS:  - Assess and monitor for signs and symptoms of infection  - Monitor lab/diagnostic results  - Monitor all insertion sites, i.e. indwelling lines, tubes, and drains  - Monitor endotracheal if appropriate and nasal secretions for changes in amount and color  - Burns appropriate cooling/warming therapies per order  - Administer medications as ordered  - Instruct and encourage patient and family to use good hand hygiene technique  - Identify and instruct in appropriate isolation precautions for  identified infection/condition  Outcome: Progressing  Goal: Absence of fever/infection during neutropenic period  Description: INTERVENTIONS:  - Monitor WBC    Outcome: Progressing     Problem: SAFETY ADULT  Goal: Patient will remain free of falls  Description: INTERVENTIONS:  - Educate patient/family on patient safety including physical limitations  - Instruct patient to call for assistance with activity   - Consult OT/PT to assist with strengthening/mobility   - Keep Call bell within reach  - Keep bed low and locked with side rails adjusted as appropriate  - Keep care items and personal belongings within reach  - Initiate and maintain comfort rounds  - Make Fall Risk Sign visible to staff  - Offer Toileting every 2 Hours, in advance of need  - Initiate/Maintain bed alarm  - Obtain necessary fall risk management equipment:   - Apply yellow socks and bracelet for high fall risk patients  - Consider moving patient to room near nurses station  Outcome: Progressing  Goal: Maintain or return to baseline ADL function  Description: INTERVENTIONS:  -  Assess patient's ability to carry out ADLs; assess patient's baseline for ADL function and identify physical deficits which impact ability to perform ADLs (bathing, care of mouth/teeth, toileting, grooming, dressing, etc.)  - Assess/evaluate cause of self-care deficits   - Assess range of motion  - Assess patient's mobility; develop plan if impaired  - Assess patient's need for assistive devices and provide as appropriate  - Encourage maximum independence but intervene and supervise when necessary  - Involve family in performance of ADLs  - Assess for home care needs following discharge   - Consider OT consult to assist with ADL evaluation and planning for discharge  - Provide patient education as appropriate  Outcome: Progressing  Goal: Maintains/Returns to pre admission functional level  Description: INTERVENTIONS:  - Perform AM-PAC 6 Click Basic Mobility/ Daily Activity  assessment daily.  - Set and communicate daily mobility goal to care team and patient/family/caregiver.   - Collaborate with rehabilitation services on mobility goals if consulted  - Perform Range of Motion 3 times a day.  - Reposition patient every 2 hours.  - Dangle patient 3 times a day  - Stand patient 3 times a day  - Ambulate patient 3 times a day  - Out of bed to chair 3 times a day   - Out of bed for meals 3 times a day  - Out of bed for toileting  - Record patient progress and toleration of activity level   Outcome: Progressing     Problem: DISCHARGE PLANNING  Goal: Discharge to home or other facility with appropriate resources  Description: INTERVENTIONS:  - Identify barriers to discharge w/patient and caregiver  - Arrange for needed discharge resources and transportation as appropriate  - Identify discharge learning needs (meds, wound care, etc.)  - Arrange for interpretive services to assist at discharge as needed  - Refer to Case Management Department for coordinating discharge planning if the patient needs post-hospital services based on physician/advanced practitioner order or complex needs related to functional status, cognitive ability, or social support system  Outcome: Progressing     Problem: Knowledge Deficit  Goal: Patient/family/caregiver demonstrates understanding of disease process, treatment plan, medications, and discharge instructions  Description: Complete learning assessment and assess knowledge base.  Interventions:  - Provide teaching at level of understanding  - Provide teaching via preferred learning methods  Outcome: Progressing

## 2024-06-19 NOTE — ASSESSMENT & PLAN NOTE
78-year-old female patient with past medical history of COPD, chronic home O2 dependent 3 to 4 L baseline, history of combined CHF with EF of 35%, anemia, CAD, recent hospitalization and 05/2024 due to septic shock requiring ICU level of care, vasopressors secondary to E. coli bacteremia was discharged on 05/15/2024.  Again presented to [facility] emergency room with complaining of generalized weakness, poor p.o. intake, sepsis likely secondary to UTI.  Likely secondary to UTI  Continue ceftriaxone  Complicated by E. coli bacteremia  Pansensitive    Continue ceftriaxone for now, monitor clinically, hemoglobin and CBC, if continues to do well can potentially transition to p.o. alternative soon.

## 2024-06-20 LAB
ABO GROUP BLD BPU: NORMAL
ANION GAP SERPL CALCULATED.3IONS-SCNC: 4 MMOL/L (ref 4–13)
BASOPHILS # BLD AUTO: 0.01 THOUSANDS/ÂΜL (ref 0–0.1)
BASOPHILS NFR BLD AUTO: 0 % (ref 0–1)
BPU ID: NORMAL
BUN SERPL-MCNC: 25 MG/DL (ref 5–25)
CALCIUM SERPL-MCNC: 8.9 MG/DL (ref 8.4–10.2)
CHLORIDE SERPL-SCNC: 109 MMOL/L (ref 96–108)
CO2 SERPL-SCNC: 29 MMOL/L (ref 21–32)
CREAT SERPL-MCNC: 1.42 MG/DL (ref 0.6–1.3)
CROSSMATCH: NORMAL
EOSINOPHIL # BLD AUTO: 0.2 THOUSAND/ÂΜL (ref 0–0.61)
EOSINOPHIL NFR BLD AUTO: 4 % (ref 0–6)
ERYTHROCYTE [DISTWIDTH] IN BLOOD BY AUTOMATED COUNT: 16.2 % (ref 11.6–15.1)
GFR SERPL CREATININE-BSD FRML MDRD: 35 ML/MIN/1.73SQ M
GLUCOSE SERPL-MCNC: 116 MG/DL (ref 65–140)
HCT VFR BLD AUTO: 27 % (ref 34.8–46.1)
HGB BLD-MCNC: 8.4 G/DL (ref 11.5–15.4)
IMM GRANULOCYTES # BLD AUTO: 0.02 THOUSAND/UL (ref 0–0.2)
IMM GRANULOCYTES NFR BLD AUTO: 0 % (ref 0–2)
LDH SERPL-CCNC: 94 U/L (ref 140–271)
LYMPHOCYTES # BLD AUTO: 1.72 THOUSANDS/ÂΜL (ref 0.6–4.47)
LYMPHOCYTES NFR BLD AUTO: 35 % (ref 14–44)
MCH RBC QN AUTO: 29.4 PG (ref 26.8–34.3)
MCHC RBC AUTO-ENTMCNC: 31.1 G/DL (ref 31.4–37.4)
MCV RBC AUTO: 94 FL (ref 82–98)
MONOCYTES # BLD AUTO: 0.57 THOUSAND/ÂΜL (ref 0.17–1.22)
MONOCYTES NFR BLD AUTO: 12 % (ref 4–12)
NEUTROPHILS # BLD AUTO: 2.44 THOUSANDS/ÂΜL (ref 1.85–7.62)
NEUTS SEG NFR BLD AUTO: 49 % (ref 43–75)
NRBC BLD AUTO-RTO: 0 /100 WBCS
PLATELET # BLD AUTO: 172 THOUSANDS/UL (ref 149–390)
PMV BLD AUTO: 10.3 FL (ref 8.9–12.7)
POTASSIUM SERPL-SCNC: 3.6 MMOL/L (ref 3.5–5.3)
RBC # BLD AUTO: 2.86 MILLION/UL (ref 3.81–5.12)
RETICS # AUTO: ABNORMAL 10*3/UL (ref 14097–95744)
RETICS # CALC: 3.19 % (ref 0.37–1.87)
SODIUM SERPL-SCNC: 142 MMOL/L (ref 135–147)
UNIT DISPENSE STATUS: NORMAL
UNIT PRODUCT CODE: NORMAL
UNIT PRODUCT VOLUME: 250 ML
UNIT RH: NORMAL
VIT B12 SERPL-MCNC: 306 PG/ML (ref 180–914)
WBC # BLD AUTO: 4.96 THOUSAND/UL (ref 4.31–10.16)

## 2024-06-20 PROCEDURE — 99233 SBSQ HOSP IP/OBS HIGH 50: CPT | Performed by: INTERNAL MEDICINE

## 2024-06-20 PROCEDURE — 83615 LACTATE (LD) (LDH) ENZYME: CPT | Performed by: INTERNAL MEDICINE

## 2024-06-20 PROCEDURE — 97110 THERAPEUTIC EXERCISES: CPT

## 2024-06-20 PROCEDURE — 82607 VITAMIN B-12: CPT | Performed by: INTERNAL MEDICINE

## 2024-06-20 PROCEDURE — 80048 BASIC METABOLIC PNL TOTAL CA: CPT | Performed by: INTERNAL MEDICINE

## 2024-06-20 PROCEDURE — 97530 THERAPEUTIC ACTIVITIES: CPT

## 2024-06-20 PROCEDURE — 83918 ORGANIC ACIDS TOTAL QUANT: CPT | Performed by: INTERNAL MEDICINE

## 2024-06-20 PROCEDURE — 97535 SELF CARE MNGMENT TRAINING: CPT

## 2024-06-20 PROCEDURE — 85045 AUTOMATED RETICULOCYTE COUNT: CPT | Performed by: INTERNAL MEDICINE

## 2024-06-20 PROCEDURE — 85025 COMPLETE CBC W/AUTO DIFF WBC: CPT | Performed by: INTERNAL MEDICINE

## 2024-06-20 RX ADMIN — VILAZODONE HYDROCHLORIDE 40 MG: 20 TABLET ORAL at 09:52

## 2024-06-20 RX ADMIN — BUPROPION HYDROCHLORIDE 75 MG: 75 TABLET, FILM COATED ORAL at 09:34

## 2024-06-20 RX ADMIN — PANTOPRAZOLE SODIUM 40 MG: 40 TABLET, DELAYED RELEASE ORAL at 09:34

## 2024-06-20 RX ADMIN — SACUBITRIL AND VALSARTAN 1 TABLET: 24; 26 TABLET, FILM COATED ORAL at 09:52

## 2024-06-20 RX ADMIN — ASPIRIN 81 MG: 81 TABLET, COATED ORAL at 09:34

## 2024-06-20 RX ADMIN — CEFTRIAXONE SODIUM 1000 MG: 10 INJECTION, POWDER, FOR SOLUTION INTRAVENOUS at 09:51

## 2024-06-20 RX ADMIN — Medication 3 MG: at 22:11

## 2024-06-20 RX ADMIN — GABAPENTIN 100 MG: 100 CAPSULE ORAL at 22:11

## 2024-06-20 RX ADMIN — PRAVASTATIN SODIUM 80 MG: 80 TABLET ORAL at 17:15

## 2024-06-20 RX ADMIN — SACUBITRIL AND VALSARTAN 1 TABLET: 24; 26 TABLET, FILM COATED ORAL at 17:15

## 2024-06-20 RX ADMIN — ZANUBRUTINIB 160 MG: 80 CAPSULE, GELATIN COATED ORAL at 09:52

## 2024-06-20 RX ADMIN — METOPROLOL SUCCINATE 100 MG: 100 TABLET, EXTENDED RELEASE ORAL at 09:34

## 2024-06-20 NOTE — PHYSICAL THERAPY NOTE
PHYSICAL THERAPY NOTE          Patient Name: Lauren Quintero  Today's Date: 6/20/2024 06/20/24 1121   PT Last Visit   PT Visit Date 06/20/24   Note Type   Note Type Treatment   Pain Assessment   Pain Assessment Tool 0-10   Pain Score No Pain   Restrictions/Precautions   Weight Bearing Precautions Per Order No   Other Precautions Chair Alarm;Bed Alarm;O2;Fall Risk;Multiple lines  (4 L O2 via NC)   General   Chart Reviewed Yes   Response to Previous Treatment Patient with no complaints from previous session.   Family/Caregiver Present No   Cognition   Overall Cognitive Status WFL   Arousal/Participation Alert;Responsive;Cooperative   Attention Within functional limits   Orientation Level Oriented X4   Memory Decreased recall of recent events   Following Commands Follows one step commands without difficulty   Comments pt agreeable to PT session   Bed Mobility   Supine to Sit 5  Supervision   Additional items Increased time required;Bedrails   Transfers   Sit to Stand 4  Minimal assistance   Additional items Assist x 1;Bedrails;Increased time required;Verbal cues   Stand to Sit 4  Minimal assistance   Additional items Assist x 1;Armrests;Increased time required;Verbal cues   Additional Comments with RW   Ambulation/Elevation   Gait pattern Forward Flexion;Decreased foot clearance;Decreased hip extension;Decreased heel strike;Decreased toe off;Excessively slow;Short stride   Gait Assistance 4  Minimal assist   Additional items Assist x 1;Verbal cues   Assistive Device Rolling walker   Distance 10'   Balance   Static Sitting Good   Dynamic Sitting Fair +   Static Standing Fair   Dynamic Standing Fair -   Ambulatory Fair -   Activity Tolerance   Activity Tolerance Patient limited by fatigue   Nurse Made Aware Spoke to RN   Exercises   Quad Sets Sitting;20 reps;AROM;Bilateral  (long sit)   Heelslides Sitting;20 reps;AROM;Bilateral   Glute  Sets Sitting;20 reps;AROM;Bilateral   Hip Abduction Sitting;20 reps;AROM;Bilateral   Hip Adduction Sitting;20 reps;AROM;Bilateral   Knee AROM Long Arc Quad Sitting;20 reps;AROM;Bilateral   Ankle Pumps Sitting;20 reps;AROM;Bilateral   Marching Sitting;20 reps;AROM;Bilateral   Assessment   Prognosis Good   Problem List Decreased strength;Decreased endurance;Impaired balance;Decreased mobility   Assessment Chart review and two person identifiers were completed. Pt seen for PT treatment session this date, consisting of ther act focused on bed mobility, transfers, and balance, ther ex focused on strengthening, and gt training on level surfaces to improve pt safety in household environment. Since previous session, pt has made good progress in terms of tolerance of exercises. Pt completed supine to sit with supervision and did not have any lightheadedness or dizziness. Pt completed STS with min A x1 with RW and was able to ambulate 10' to bedside chair with RW and min A x1. Pt completed stand to sit with min A x1 with armrests. Pt completed additional STS with RW and maintained standing balance for ~2 minutes while getting assistance with bathroom hygiene. Pt returned to sitting with min A x1. Pt completed seated exercises with rest breaks due to muscular fatigue. Pt ended session seated in recliner with alarm activated and all needs within reach. Spoke to RN about session outcomes. Pertinent barriers during this session include  fatigue . Current goals and POC established on IE remain appropriate. Pt prognosis for achieving goals is good, pending pt progress with hospitalization/medical status improvements, and indicated by ability to follow directions, responsive to cues/strategies, and previous response to intervention. Pt limited d/t limited family/caregiver support. Pt continues to be functioning below baseline level, and remains limited due to factors listed above. PT will continue to see pt during current  hospitalization in order to address the deficits listed above and provide interventions consistent w/ POC in effort to achieve STGs. PT recommends level 2, moderate resource intensity upon discharge.   Barriers to Discharge Inaccessible home environment;Decreased caregiver support   Goals   Patient Goals To sit by the window   STG Expiration Date 06/25/24   PT Treatment Day 1   Plan   Treatment/Interventions Functional transfer training;LE strengthening/ROM;Elevations;Therapeutic exercise;Endurance training;Patient/family training;Equipment eval/education;Bed mobility;Gait training   Progress Progressing toward goals   PT Frequency 3-5x/wk   Discharge Recommendation   Rehab Resource Intensity Level, PT II (Moderate Resource Intensity)   Equipment Recommended Walker   AM-PAC Basic Mobility Inpatient   Turning in Flat Bed Without Bedrails 3   Lying on Back to Sitting on Edge of Flat Bed Without Bedrails 3   Moving Bed to Chair 3   Standing Up From Chair Using Arms 3   Walk in Room 3   Climb 3-5 Stairs With Railing 2   Basic Mobility Inpatient Raw Score 17   Basic Mobility Standardized Score 39.67   [facility] Highest Level Of Mobility   -[facility] Goal 5: Stand one or more mins   -HL Achieved 6: Walk 10 steps or more   Education   Education Provided Mobility training;Home exercise program;Assistive device   Patient Demonstrates acceptance/verbal understanding   End of Consult   Patient Position at End of Consult Bedside chair;Bed/Chair alarm activated;All needs within reach     Abhi Diane, PT, DPT

## 2024-06-20 NOTE — ASSESSMENT & PLAN NOTE
Wt Readings from Last 3 Encounters:   06/14/24 56.1 kg (123 lb 10.9 oz)   06/11/24 54.9 kg (121 lb)   06/05/24 55.2 kg (121 lb 9.6 oz)       History of combined CHF with the most recent EF of 35%  Appears euvolemic, Toprol and Entresto  Monitor

## 2024-06-20 NOTE — CASE MANAGEMENT
Case Management Discharge Planning Note    Patient name Lauren Quintero  Location 2 EAST 258/2 E 258-01 MRN [id number]  : 1945 Date 2024       Current Admission Date: 2024  Current Admission Diagnosis:Sepsis ([facility])   Patient Active Problem List    Diagnosis Date Noted Date Diagnosed    Chronic respiratory failure with hypoxia ([facility]) 2024     Acute kidney injury superimposed on chronic kidney disease  ([facility]) 2024     Sepsis ([facility]) 2024     DARLING (acute kidney injury) ([facility]) 2024     Diarrhea 2024     Pain 04/15/2024     Loose stools 2024     Insomnia 2024     Non-ST elevation MI (NSTEMI) ([facility]) 2024     Abnormal chest x-ray 2024     Chronic combined systolic and diastolic CHF (congestive heart failure) ([facility]) 2024     Elevated troponin 2024     CKD (chronic kidney disease) 2024     Dizziness 2023     Social problem 2023     Diverticulitis 10/29/2023     Moderate protein-calorie malnutrition ([facility]) 2023     BRBPR (bright red blood per rectum) 2023     Generalized weakness 2023     Staring episodes 2023     Waldenstrom macroglobulinemia ([facility]) 2023     Severe protein-calorie malnutrition ([facility]) 2021     Positive blood culture 2021     COPD (chronic obstructive pulmonary disease) ([facility]) 2021     Anemia 2021     Abnormal computed tomography angiography (CTA) 2021     Fatigue 2021     Major depressive disorder, recurrent episode, moderate ([facility]) 2019     Flank pain 2019     Coronary artery disease of native artery of native heart with stable angina pectoris ([facility]) 2018     Acute on chronic respiratory failure with hypoxia ([facility]) 2018     Ambulatory dysfunction 2018     Essential hypertension 2018     Tobacco abuse 2018       LOS (days): 6  Geometric Mean LOS (GMLOS) (days): 3.6  Days to GMLOS:-2.2     OBJECTIVE:  Risk of Unplanned  Readmission Score: 35.68      Current admission status: Inpatient   Preferred Pharmacy:   [id number] [place] - [address]  [address]  [place] [address]  Phone: [phone] Fax: [phone]    [facility] [place] - [address]  [address]  [address]  [place] [address]  Phone: [phone] Fax: [phone]    [place], [place] - [address]  [address] [facility] [address] [address]  Phone: [phone] Fax: [phone]    [facility] [place] - [address]  [address] [facility] [place] [address]  Phone: [phone] Fax: [phone]    Primary Care Provider: Starla Bateman MD  Primary Insurance: MEDICARE  Secondary Insurance:     DISCHARGE DETAILS:    Discharge planning discussed with:: Patient at bedside.  Freedom of Choice: Yes  Comments - Freedom of Choice: FOC maintained - DCP reviewed.  Patient plans to d/c home tomorrow with [facility].  CM contacted family/caregiver?: No- see comments  Were Treatment Team discharge recommendations reviewed with patient/caregiver?: Yes  Did patient/caregiver verbalize understanding of patient care needs?: Yes  Were patient/caregiver advised of the risks associated with not following Treatment Team discharge recommendations?: Yes     IMM Given (Date):: 06/20/24  IMM Given to:: Patient (Verbal review of IMM with patient at bedside.  Understanding confirmed.  Patient copy given.  Original to medical records.)

## 2024-06-20 NOTE — QUICK NOTE
Per RN patient reports she is no longer on the LifeVest, questioning being on telemetry.  Per review of chart patient did see outpatient cardiologist on 5/22/2024 where it was reported patient can discontinue LifeVest and return LifeVest back to company.  Since patient euvolemic and no longer on LifeVest at home will DC telemetry order while inpatient.

## 2024-06-20 NOTE — ASSESSMENT & PLAN NOTE
Present on admission history  anemia.  EGD in 2023 Mild erosive gastritis and Duodenal bulb angiectasia status post APC  Hemoglobin trending down 6/18/24 was 6.9 than 6.8 on 6/19 1U PRBc was ordered and administered on 6/19/2024  Hemoglobin has increased to 8.4 today (6/20/2024)  She denies dark stools

## 2024-06-20 NOTE — ASSESSMENT & PLAN NOTE
78-year-old female patient with past medical history of COPD, chronic home O2 dependent 3 to 4 L baseline, history of combined CHF with EF of 35%, anemia, CAD, recent hospitalization and 05/2024 due to septic shock requiring ICU level of care, vasopressors secondary to E. coli bacteremia was discharged on 05/15/2024.  Again presented to [facility] emergency room with complaining of generalized weakness, poor p.o. intake, sepsis likely secondary to UTI.  Likely secondary to UTI  Continue IV ceftriaxone, will transition to p.o. equivalence upon discharge   Complicated by E. coli bacteremia  Pansensitive

## 2024-06-20 NOTE — PROGRESS NOTES
[facility]  Progress Note  Name: Lauren Quintero I  MRN: [id number]  Unit/Bed#: 2 E 258-01 I Date of Admission: 6/14/2024   Date of Service: 6/20/2024 I Hospital Day: 6    Assessment & Plan   * Sepsis (HCC)  Assessment & Plan  78-year-old female patient with past medical history of COPD, chronic home O2 dependent 3 to 4 L baseline, history of combined CHF with EF of 35%, anemia, CAD, recent hospitalization and 05/2024 due to septic shock requiring ICU level of care, vasopressors secondary to E. coli bacteremia was discharged on 05/15/2024.  Again presented to [facility] emergency room with complaining of generalized weakness, poor p.o. intake, sepsis likely secondary to UTI.  Likely secondary to UTI  Continue IV ceftriaxone, will transition to p.o. equivalence upon discharge   Complicated by E. coli bacteremia  Pansensitive      If patient doing well when anemia not an issue can consider potential discharge in the next 24 hours      Anemia  Assessment & Plan  Present on admission history  anemia.  EGD in 2023 Mild erosive gastritis and Duodenal bulb angiectasia status post APC  Hemoglobin trending down 6/18/24 was 6.9 than 6.8 on 6/19 1U PRBc was ordered and administered on 6/19/2024  Hemoglobin has increased to 8.4 today (6/20/2024)  She denies dark stools     Chronic respiratory failure with hypoxia (HCC)  Assessment & Plan  Chronically on O2  Continue supplementation and monitor    Chronic combined systolic and diastolic CHF (congestive heart failure) (HCC)  Assessment & Plan  Wt Readings from Last 3 Encounters:   06/14/24 56.1 kg (123 lb 10.9 oz)   06/11/24 54.9 kg (121 lb)   06/05/24 55.2 kg (121 lb 9.6 oz)       History of combined CHF with the most recent EF of 35%  Appears euvolemic, Toprol and Entresto  Monitor      CKD (chronic kidney disease)  Assessment & Plan  Creatinine remains at baseline of approximately 1.5  Remains near baseline  Continue to monitor    COPD (chronic  obstructive pulmonary disease) (HCC)  Assessment & Plan  Noted history; + smoker  Follows with Pulmonology OP  Chonically on 4L nc at home.  Not in acute exacerbation  Continue home inhalers      Essential hypertension  Assessment & Plan  Patient does have history of hypertension, takes Toprol XL/Entresto at home.  Monitor blood pressure               VTE Pharmacologic Prophylaxis: VTE Score: 3 Moderate Risk (Score 3-4) - Pharmacological DVT Prophylaxis Contraindicated. Sequential Compression Devices Ordered.    Mobility:   Basic Mobility Inpatient Raw Score: 18  JH-HLM Goal: 6: Walk 10 steps or more  JH-HLM Achieved: 4: Move to chair/commode  JH-HLM Goal NOT achieved. Continue with multidisciplinary rounding and encourage appropriate mobility to improve upon JH-HLM goals.    Patient Centered Rounds: I performed bedside rounds with nursing staff today.  Discussions with Specialists or Other Care Team Provider:  None      Education and Discussions with Family / Patient: All questions answered at this time. Spoke with son on the phone about potential discharge tomorrow.     Current Length of Stay: 6 day(s)  Current Patient Status: Inpatient   Certification Statement: The patient will continue to require additional inpatient hospital stay due to plan as noted above  Discharge Plan: Anticipate discharge tomorrow to home with home services.    Code Status: Level 1 - Full Code    Subjective:     No acute events per patient and nursing. She was sitting comfortable in bed during our encounter. She has no complaints at this time. Denies vomiting, nausea, chills or fevers.     Objective:     Vitals:   Temp (24hrs), Av.8 °F (36.6 °C), Min:97.4 °F (36.3 °C), Max:98.2 °F (36.8 °C)    Temp:  [97.4 °F (36.3 °C)-98.2 °F (36.8 °C)] 97.4 °F (36.3 °C)  HR:  [73-77] 75  Resp:  [18] 18  BP: (121-148)/(71-74) 121/71  SpO2:  [96 %-98 %] 96 %  Body mass index is 19.37 kg/m².     Input and Output Summary (last 24 hours):      Intake/Output Summary (Last 24 hours) at 6/20/2024 1330  Last data filed at 6/20/2024 0900  Gross per 24 hour   Intake 420 ml   Output 350 ml   Net 70 ml       Physical Exam:   Physical Exam  Vitals and nursing note reviewed.   Constitutional:       General: She is not in acute distress.     Appearance: Normal appearance. She is normal weight. She is not ill-appearing, toxic-appearing or diaphoretic.   HENT:      Head: Normocephalic and atraumatic.      Right Ear: External ear normal.      Left Ear: External ear normal.      Nose: Nose normal. No congestion.      Mouth/Throat:      Mouth: Mucous membranes are moist.      Pharynx: Oropharynx is clear. No oropharyngeal exudate or posterior oropharyngeal erythema.   Eyes:      General: No scleral icterus.        Right eye: No discharge.         Left eye: No discharge.      Extraocular Movements: Extraocular movements intact.      Conjunctiva/sclera: Conjunctivae normal.      Pupils: Pupils are equal, round, and reactive to light.   Cardiovascular:      Rate and Rhythm: Normal rate and regular rhythm.      Pulses: Normal pulses.      Heart sounds: Normal heart sounds. No murmur heard.     No friction rub. No gallop.   Pulmonary:      Effort: Pulmonary effort is normal. No respiratory distress.      Breath sounds: Normal breath sounds. No stridor. No wheezing, rhonchi or rales.   Chest:      Chest wall: No tenderness.   Abdominal:      General: Abdomen is flat. Bowel sounds are normal. There is no distension.      Palpations: Abdomen is soft. There is no mass.      Tenderness: There is no abdominal tenderness. There is no guarding or rebound.   Musculoskeletal:         General: No swelling, tenderness, deformity or signs of injury. Normal range of motion.      Cervical back: Normal range of motion and neck supple. No rigidity. No muscular tenderness.   Skin:     General: Skin is warm and dry.      Capillary Refill: Capillary refill takes less than 2 seconds.       Coloration: Skin is not jaundiced or pale.      Findings: No bruising, erythema, lesion or rash.   Neurological:      General: No focal deficit present.      Mental Status: She is alert and oriented to person, place, and time. Mental status is at baseline.      Cranial Nerves: No cranial nerve deficit.      Sensory: No sensory deficit.      Motor: No weakness.      Coordination: Coordination normal.   Psychiatric:         Mood and Affect: Mood normal.         Behavior: Behavior normal.         Thought Content: Thought content normal.         Judgment: Judgment normal.            Additional Data:     Labs:  Results from last 7 days   Lab Units 06/20/24 0445   WBC Thousand/uL 4.96   HEMOGLOBIN g/dL 8.4*   HEMATOCRIT % 27.0*   PLATELETS Thousands/uL 172   SEGS PCT % 49   LYMPHO PCT % 35   MONO PCT % 12   EOS PCT % 4     Results from last 7 days   Lab Units 06/20/24  0445 06/19/24  0525   SODIUM mmol/L 142 141   POTASSIUM mmol/L 3.6 3.8   CHLORIDE mmol/L 109* 110*   CO2 mmol/L 29 28   BUN mg/dL 25 29*   CREATININE mg/dL 1.42* 1.48*   ANION GAP mmol/L 4 3*   CALCIUM mg/dL 8.9 8.8   ALBUMIN g/dL  --  3.0*   TOTAL BILIRUBIN mg/dL  --  0.24   ALK PHOS U/L  --  81   ALT U/L  --  4*   AST U/L  --  11*   GLUCOSE RANDOM mg/dL 116 83     Results from last 7 days   Lab Units 06/14/24 2000   INR  1.03             Results from last 7 days   Lab Units 06/18/24  0542 06/15/24  0520 06/14/24 2000   LACTIC ACID mmol/L  --   --  0.9   PROCALCITONIN ng/ml 0.52* 0.50* 0.13       Lines/Drains:  Invasive Devices       Peripheral Intravenous Line  Duration             Peripheral IV 06/19/24 Right Antecubital <1 day                          Imaging: Reviewed radiology reports from this admission including:   XR chest 2 views    Result Date: 6/14/2024  Impression: No acute cardiopulmonary disease. Redemonstration of partial right middle lobe atelectasis. Workstation performed: [id number]       No Chest XR results available for this patient.      Results for orders placed during the hospital encounter of 02/15/24    Echo complete w/ contrast if indicated    Interpretation Summary    Left Ventricle: Left ventricular cavity size is normal. Wall thickness is mildly increased. There is mild asymmetric hypertrophy of the basal septal wall. The left ventricular ejection fraction is 35%. Systolic function is severely reduced. Diastolic function is mildly abnormal, consistent with grade I (abnormal) relaxation.    The following segments are hypokinetic: mid anteroseptal, mid inferoseptal, apical anterior, apical septal, apical inferior and apex.    Mitral Valve: There is severe annular calcification.    Tricuspid Valve: There is mild regurgitation.    IVC/SVC: The inferior vena cava is dilated.    Pulmonary Artery: The estimated pulmonary artery systolic pressure is 40.0 mmHg. The pulmonary artery systolic pressure is mildly increased.      Recent Cultures (last 7 days):   Results from last 7 days   Lab Units 06/14/24 2017 06/14/24 2011 06/14/24 2000   BLOOD CULTURE   --  No Growth After 5 Days. Escherichia coli*   GRAM STAIN RESULT   --   --  Gram negative rods*   URINE CULTURE  >100,000 cfu/ml Escherichia coli*  --   --        Last 24 Hours Medication List:   Current Facility-Administered Medications   Medication Dose Route Frequency Provider Last Rate    acetaminophen  650 mg Oral Q6H PRN Jaleel Rodrigez MD      albuterol  2.5 mg Nebulization Q6H PRN Jimmy Rosen MD      aspirin  81 mg Oral Daily Domenic YAÑEZ MD      buPROPion  75 mg Oral Daily Domenic YAÑEZ MD      cefTRIAXone  1,000 mg Intravenous Q24H Domenic YAÑEZ MD 1,000 mg (06/20/24 0951)    gabapentin  100 mg Oral HS Domenic YAÑEZ MD      melatonin  3 mg Oral HS Royal Prescott MD      metoprolol succinate  100 mg Oral Daily Jimmy Rosen MD      morphine injection  2 mg Intravenous Q6H PRN Jaleel Rodrigez MD      oxyCODONE  5 mg Oral Q6H PRN Jaleel Rodrigez MD       pantoprazole  40 mg Oral Daily Domenic YAÑEZ MD      pravastatin  80 mg Oral QPM Domenic YAÑEZ MD      sacubitril-valsartan  1 tablet Oral BID Jimmy Rosen MD      umeclidinium-vilanterol  1 puff Inhalation Daily Jaleel Rodrigez MD      vilazodone  40 mg Oral Daily With Breakfast Domenic YAÑEZ MD      Zanubrutinib  160 mg Oral BID Jimmy Rosen MD          Today, Patient Was Seen By: Glenys Pedro and the attending physician, Kenny Stone,*        **Please Note: This note may have been constructed using a voice recognition system.**

## 2024-06-20 NOTE — PLAN OF CARE
Problem: PHYSICAL THERAPY ADULT  Goal: Performs mobility at highest level of function for planned discharge setting.  See evaluation for individualized goals.  Description: Treatment/Interventions: Functional transfer training, LE strengthening/ROM, Elevations, Therapeutic exercise, Endurance training, Patient/family training, Equipment eval/education, Bed mobility, Gait training  Equipment Recommended: Walker       See flowsheet documentation for full assessment, interventions and recommendations.  Outcome: Progressing  Note: Prognosis: Good  Problem List: Decreased strength, Decreased endurance, Impaired balance, Decreased mobility  Assessment: Chart review and two person identifiers were completed. Pt seen for PT treatment session this date, consisting of ther act focused on bed mobility, transfers, and balance, ther ex focused on strengthening, and gt training on level surfaces to improve pt safety in household environment. Since previous session, pt has made good progress in terms of tolerance of exercises. Pt completed supine to sit with supervision and did not have any lightheadedness or dizziness. Pt completed STS with min A x1 with RW and was able to ambulate 10' to bedside chair with RW and min A x1. Pt completed stand to sit with min A x1 with armrests. Pt completed additional STS with RW and maintained standing balance for ~2 minutes while getting assistance with bathroom hygiene. Pt returned to sitting with min A x1. Pt completed seated exercises with rest breaks due to muscular fatigue. Pt ended session seated in recliner with alarm activated and all needs within reach. Spoke to RN about session outcomes. Pertinent barriers during this session include  fatigue . Current goals and POC established on IE remain appropriate. Pt prognosis for achieving goals is good, pending pt progress with hospitalization/medical status improvements, and indicated by ability to follow directions, responsive to  cues/strategies, and previous response to intervention. Pt limited d/t limited family/caregiver support. Pt continues to be functioning below baseline level, and remains limited due to factors listed above. PT will continue to see pt during current hospitalization in order to address the deficits listed above and provide interventions consistent w/ POC in effort to achieve STGs. PT recommends level 2, moderate resource intensity upon discharge.  Barriers to Discharge: Inaccessible home environment, Decreased caregiver support     Rehab Resource Intensity Level, PT: II (Moderate Resource Intensity)    See flowsheet documentation for full assessment.

## 2024-06-20 NOTE — OCCUPATIONAL THERAPY NOTE
Occupational Therapy Treatment Note     Patient Name: Lauren Quintero  Today's Date: 6/20/2024  Problem List  Principal Problem:    Sepsis (HCC)  Active Problems:    Essential hypertension    COPD (chronic obstructive pulmonary disease) (HCC)    Anemia    [place] macroglobulinemia (HCC)    CKD (chronic kidney disease)    Chronic combined systolic and diastolic CHF (congestive heart failure) (HCC)    Chronic respiratory failure with hypoxia (HCC)        06/20/24 1355   OT Last Visit   OT Visit Date 06/20/24   Note Type   Note Type Treatment   Pain Assessment   Pain Assessment Tool 0-10   Pain Score No Pain   Restrictions/Precautions   Weight Bearing Precautions Per Order No   Other Precautions Fall Risk;O2;Chair Alarm;Multiple lines  (4 L O2 via NC)   ADL   Where Assessed Commode   UB Bathing Assistance 5  Supervision/Setup   UB Bathing Deficit Setup;Verbal cueing;Supervision/safety;Increased time to complete   LB Bathing Assistance 4  Minimal Assistance   LB Bathing Deficit Setup;Verbal cueing;Supervision/safety;Increased time to complete;Right lower leg including foot;Left lower leg including foot   UB Dressing Assistance 4  Minimal Assistance   UB Dressing Deficit Setup;Supervision/safety;Verbal cueing;Increased time to complete;Pull around back;Fasteners   UB Dressing Comments Pt [facility]   LB Dressing Assistance 3  Moderate Assistance   LB Dressing Deficit Setup;Steadying;Increased time to complete;Verbal cueing;Supervision/safety;Don/doff R sock;Don/doff L sock   Toileting Assistance  3  Moderate Assistance   Toileting Deficit Setup;Verbal cueing;Supervison/safety;Increased time to complete;Bedside commode;Perineal hygiene   Bed Mobility   Additional Comments DNT bed mobility: pt seated OOB in recliner upon arrival and at end of session   Transfers   Sit to Stand 4  Minimal assistance   Additional items Assist x 1;Armrests;Increased time required;Verbal cues   Stand to Sit 4  Minimal assistance    Additional items Assist x 1;Armrests;Increased time required;Verbal cues   Toilet transfer 4  Minimal assistance   Additional items Assist x 1;Armrests;Increased time required;Verbal cues;Commode   Functional Mobility   Functional Mobility 4  Minimal assistance   Additional Comments Pt ambulated to/from bathroom with no overt LOB. Pt grossly unsteady and reported mild SOB with mobility. SpO2 >90% with O2 donned   Additional items Rolling walker   Toilet Transfers   Toilet Transfer From   (Recliner)   Toilet Transfer Type To and from   Toilet Transfer to Standard bedside commode  (over toilet)   Toilet Transfer Technique Ambulating   Toilet Transfers Minimal assistance   Cognition   Overall Cognitive Status WFL   Arousal/Participation Alert;Responsive;Cooperative   Attention Within functional limits   Orientation Level Oriented X4   Memory Decreased recall of recent events   Following Commands Follows one step commands without difficulty   Comments Pt agreeable to OT session   Activity Tolerance   Activity Tolerance Patient limited by fatigue   Assessment   Assessment Patient participated in Skilled OT session this date with interventions consisting of ADL re training with the use of correct body mechnaics,  therapeutic activities to: increase activity tolerance, and increase dynamic sit/ stand balance during functional activity  . Patient agreeable to OT treatment session, upon arrival patient was found seated OOB to Recliner and in no apparent distress. Patient completed functional transfers with min assist and ambulated to/from bathroom with min assist utilizing RW. Pt completed toilet transfer with min assist and required mod assist for toilet hygiene. While seated on commode pt required sup-min assist for UB ADLs and min-mod assist for LB ADLs.  In comparison to previous session, patient with improvements in endurance. Patient requiring ocassional safety reminders for O2 line management. Patient continues to  be functioning below baseline level, occupational performance remains limited secondary to factors listed above and increased risk for falls and injury.   From OT standpoint, recommendation at time of d/c would be Level II (moderate resource intensity).   Patient to benefit from continued Occupational Therapy treatment while in the hospital to address deficits as defined above and maximize level of functional independence with ADLs and functional mobility.   Plan   Treatment Interventions ADL retraining;Functional transfer training;Endurance training;Patient/family training;Compensatory technique education;Energy conservation   Goal Expiration Date 06/30/24   OT Treatment Day 1   OT Frequency 3-5x/wk   Discharge Recommendation   Rehab Resource Intensity Level, OT II (Moderate Resource Intensity)   AM-PAC Daily Activity Inpatient   Lower Body Dressing 2   Bathing 2   Toileting 2   Upper Body Dressing 3   Grooming 3   Eating 4   Daily Activity Raw Score 16   Daily Activity Standardized Score (Calc for Raw Score >=11) 35.96   AM-PAC Applied Cognition Inpatient   Following a Speech/Presentation 3   Understanding Ordinary Conversation 4   Taking Medications 3   Remembering Where Things Are Placed or Put Away 3   Remembering List of 4-5 Errands 3   Taking Care of Complicated Tasks 3   Applied Cognition Raw Score 19   Applied Cognition Standardized Score 39.77   Modified [place] Scale   Modified [place] Scale 4   End of Consult   Patient Position at End of Consult Bedside chair;Bed/Chair alarm activated;All needs within reach   Nurse Communication Nurse aware of consult     Renate Carmona OTMIGUEL ANGEL, OTR/L

## 2024-06-20 NOTE — PLAN OF CARE
Problem: OCCUPATIONAL THERAPY ADULT  Goal: Performs self-care activities at highest level of function for planned discharge setting.  See evaluation for individualized goals.  Description: Treatment Interventions: ADL retraining, Functional transfer training, Endurance training, Patient/family training, Compensatory technique education, Energy conservation          See flowsheet documentation for full assessment, interventions and recommendations.   Note: Limitation: Decreased ADL status, Decreased UE strength, Decreased endurance, Decreased self-care trans, Decreased high-level ADLs, Decreased Safe judgement during ADL, Decreased cognition  Prognosis: Good  Assessment: Patient participated in Skilled OT session this date with interventions consisting of ADL re training with the use of correct body mechnaics,  therapeutic activities to: increase activity tolerance, and increase dynamic sit/ stand balance during functional activity  . Patient agreeable to OT treatment session, upon arrival patient was found seated OOB to Recliner and in no apparent distress. Patient completed functional transfers with min assist and ambulated to/from bathroom with min assist utilizing RW. Pt completed toilet transfer with min assist and required mod assist for toilet hygiene. While seated on commode pt required sup-min assist for UB ADLs and min-mod assist for LB ADLs.  In comparison to previous session, patient with improvements in endurance. Patient requiring ocassional safety reminders for O2 line management. Patient continues to be functioning below baseline level, occupational performance remains limited secondary to factors listed above and increased risk for falls and injury.   From OT standpoint, recommendation at time of d/c would be Level II (moderate resource intensity).   Patient to benefit from continued Occupational Therapy treatment while in the hospital to address deficits as defined above and maximize level of  functional independence with ADLs and functional mobility.     Rehab Resource Intensity Level, OT: II (Moderate Resource Intensity)        Renate Carmona OTD, OTR/L

## 2024-06-21 VITALS
SYSTOLIC BLOOD PRESSURE: 138 MMHG | TEMPERATURE: 97.5 F | BODY MASS INDEX: 19.41 KG/M2 | OXYGEN SATURATION: 96 % | WEIGHT: 123.68 LBS | RESPIRATION RATE: 20 BRPM | HEIGHT: 67 IN | DIASTOLIC BLOOD PRESSURE: 71 MMHG | HEART RATE: 81 BPM

## 2024-06-21 LAB
ANION GAP SERPL CALCULATED.3IONS-SCNC: 4 MMOL/L (ref 4–13)
BASOPHILS # BLD AUTO: 0.02 THOUSANDS/ÂΜL (ref 0–0.1)
BASOPHILS NFR BLD AUTO: 0 % (ref 0–1)
BUN SERPL-MCNC: 22 MG/DL (ref 5–25)
CALCIUM SERPL-MCNC: 9.2 MG/DL (ref 8.4–10.2)
CHLORIDE SERPL-SCNC: 107 MMOL/L (ref 96–108)
CO2 SERPL-SCNC: 30 MMOL/L (ref 21–32)
CREAT SERPL-MCNC: 1.38 MG/DL (ref 0.6–1.3)
EOSINOPHIL # BLD AUTO: 0.26 THOUSAND/ÂΜL (ref 0–0.61)
EOSINOPHIL NFR BLD AUTO: 5 % (ref 0–6)
ERYTHROCYTE [DISTWIDTH] IN BLOOD BY AUTOMATED COUNT: 16.1 % (ref 11.6–15.1)
GFR SERPL CREATININE-BSD FRML MDRD: 36 ML/MIN/1.73SQ M
GLUCOSE SERPL-MCNC: 85 MG/DL (ref 65–140)
HCT VFR BLD AUTO: 28.1 % (ref 34.8–46.1)
HGB BLD-MCNC: 8.6 G/DL (ref 11.5–15.4)
IMM GRANULOCYTES # BLD AUTO: 0.02 THOUSAND/UL (ref 0–0.2)
IMM GRANULOCYTES NFR BLD AUTO: 0 % (ref 0–2)
LYMPHOCYTES # BLD AUTO: 2.15 THOUSANDS/ÂΜL (ref 0.6–4.47)
LYMPHOCYTES NFR BLD AUTO: 38 % (ref 14–44)
MCH RBC QN AUTO: 29 PG (ref 26.8–34.3)
MCHC RBC AUTO-ENTMCNC: 30.6 G/DL (ref 31.4–37.4)
MCV RBC AUTO: 95 FL (ref 82–98)
MONOCYTES # BLD AUTO: 0.66 THOUSAND/ÂΜL (ref 0.17–1.22)
MONOCYTES NFR BLD AUTO: 12 % (ref 4–12)
NEUTROPHILS # BLD AUTO: 2.57 THOUSANDS/ÂΜL (ref 1.85–7.62)
NEUTS SEG NFR BLD AUTO: 45 % (ref 43–75)
NRBC BLD AUTO-RTO: 0 /100 WBCS
PLATELET # BLD AUTO: 189 THOUSANDS/UL (ref 149–390)
PMV BLD AUTO: 9.9 FL (ref 8.9–12.7)
POTASSIUM SERPL-SCNC: 3.6 MMOL/L (ref 3.5–5.3)
RBC # BLD AUTO: 2.97 MILLION/UL (ref 3.81–5.12)
SODIUM SERPL-SCNC: 141 MMOL/L (ref 135–147)
WBC # BLD AUTO: 5.68 THOUSAND/UL (ref 4.31–10.16)

## 2024-06-21 PROCEDURE — 85025 COMPLETE CBC W/AUTO DIFF WBC: CPT | Performed by: INTERNAL MEDICINE

## 2024-06-21 PROCEDURE — 80048 BASIC METABOLIC PNL TOTAL CA: CPT | Performed by: INTERNAL MEDICINE

## 2024-06-21 PROCEDURE — 97116 GAIT TRAINING THERAPY: CPT

## 2024-06-21 PROCEDURE — 99239 HOSP IP/OBS DSCHRG MGMT >30: CPT | Performed by: INTERNAL MEDICINE

## 2024-06-21 PROCEDURE — 97110 THERAPEUTIC EXERCISES: CPT

## 2024-06-21 RX ORDER — CEPHALEXIN 500 MG/1
500 CAPSULE ORAL EVERY 6 HOURS SCHEDULED
Qty: 28 CAPSULE | Refills: 0 | Status: SHIPPED | OUTPATIENT
Start: 2024-06-21 | End: 2024-06-28

## 2024-06-21 RX ADMIN — UMECLIDINIUM BROMIDE AND VILANTEROL TRIFENATATE 1 PUFF: 62.5; 25 POWDER RESPIRATORY (INHALATION) at 08:37

## 2024-06-21 RX ADMIN — VILAZODONE HYDROCHLORIDE 40 MG: 20 TABLET ORAL at 08:34

## 2024-06-21 RX ADMIN — SACUBITRIL AND VALSARTAN 1 TABLET: 24; 26 TABLET, FILM COATED ORAL at 08:33

## 2024-06-21 RX ADMIN — ASPIRIN 81 MG: 81 TABLET, COATED ORAL at 08:33

## 2024-06-21 RX ADMIN — PANTOPRAZOLE SODIUM 40 MG: 40 TABLET, DELAYED RELEASE ORAL at 08:33

## 2024-06-21 RX ADMIN — METOPROLOL SUCCINATE 100 MG: 100 TABLET, EXTENDED RELEASE ORAL at 08:33

## 2024-06-21 RX ADMIN — CEFTRIAXONE SODIUM 1000 MG: 10 INJECTION, POWDER, FOR SOLUTION INTRAVENOUS at 08:39

## 2024-06-21 RX ADMIN — BUPROPION HYDROCHLORIDE 75 MG: 75 TABLET, FILM COATED ORAL at 08:33

## 2024-06-21 NOTE — PLAN OF CARE
Problem: INFECTION - ADULT  Goal: Absence or prevention of progression during hospitalization  Description: INTERVENTIONS:  - Assess and monitor for signs and symptoms of infection  - Monitor lab/diagnostic results  - Monitor all insertion sites, i.e. indwelling lines, tubes, and drains  - Monitor endotracheal if appropriate and nasal secretions for changes in amount and color  - [place] appropriate cooling/warming therapies per order  - Administer medications as ordered  - Instruct and encourage patient and family to use good hand hygiene technique  - Identify and instruct in appropriate isolation precautions for identified infection/condition  Outcome: Progressing  Goal: Absence of fever/infection during neutropenic period  Description: INTERVENTIONS:  - Monitor WBC    Outcome: Progressing

## 2024-06-21 NOTE — ASSESSMENT & PLAN NOTE
78-year-old female patient with past medical history of COPD, chronic home O2 dependent 3 to 4 L baseline, history of combined CHF with EF of 35%, anemia, CAD, recent hospitalization and 05/2024 due to septic shock requiring ICU level of care, vasopressors secondary to E. coli bacteremia was discharged on 05/15/2024.  Again presented to [facility] emergency room with complaining of generalized weakness, poor p.o. intake, sepsis likely secondary to UTI.  Likely secondary to UTI  Complicated by E. coli bacteremia  Transition IV ceftriaxone to p.o. Cefpodoxime upon discharge

## 2024-06-21 NOTE — DISCHARGE SUMMARY
[facility]  Discharge- Lauren Quintero 1945, 78 y.o. female MRN: [id number]  Unit/Bed#: 2 E 258-01 Encounter: [id number]  Primary Care Provider: Starla Bateman MD   Date and time admitted to hospital: 6/14/2024  7:37 PM    * Sepsis (HCC)  Assessment & Plan  78-year-old female patient with past medical history of COPD, chronic home O2 dependent 3 to 4 L baseline, history of combined CHF with EF of 35%, anemia, CAD, recent hospitalization and 05/2024 due to septic shock requiring ICU level of care, vasopressors secondary to E. coli bacteremia was discharged on 05/15/2024.  Again presented to [facility] emergency room with complaining of generalized weakness, poor p.o. intake, sepsis likely secondary to UTI.  Likely secondary to UTI  Complicated by E. coli bacteremia  Transition IV ceftriaxone to p.o. cephalexin on discharge-more days to complete treatment for sepsis UTI and bacteremia      Anemia  Assessment & Plan  Present on admission history anemia.  EGD in 2023 Mild erosive gastritis and Duodenal bulb angiectasia status post APC  Hemoglobin trending down 6/18/24 was 6.9 than 6.8 on 6/19 1U PRBc was ordered and administered on 6/19/2024  Hemoglobin has increased to 8.6 today (6/21/2024) which is stable from yesterday   She denies dark stools  Stable Hb    Chronic respiratory failure with hypoxia (HCC)  Assessment & Plan  Chronically on O2  Continue supplementation oxygen at home    Chronic combined systolic and diastolic CHF (congestive heart failure) (HCC)  Assessment & Plan  Wt Readings from Last 3 Encounters:   06/14/24 56.1 kg (123 lb 10.9 oz)   06/11/24 54.9 kg (121 lb)   06/05/24 55.2 kg (121 lb 9.6 oz)       History of combined CHF with a previous EF of 35%, most recent EF has improved to 50%  Life vest discontinued   Appears euvolemic, Toprol and Entresto        CKD (chronic kidney disease)  Assessment & Plan  Creatinine remains at baseline of approximately 1.5  Remains near  "baseline, Stable    COPD (chronic obstructive pulmonary disease) ([facility])  Assessment & Plan  Noted history; + smoker  Follows with Pulmonology OP  Chonically on 4L nc at home.  Not in acute exacerbation  Continue home inhalers      Essential hypertension  Assessment & Plan  Patient does have history of hypertension, takes Toprol XL/Entresto at home.  Stable        Discharging Physician / Practitioner: Kenny Stone,*  PCP: Starla Bateman MD  Admission Date:   Admission Orders (From admission, onward)       Ordered        06/14/24 2152  INPATIENT ADMISSION  Once                          Discharge Date: 06/21/24    Consultations During Hospital Stay:  None    Procedures Performed:   none    Significant Findings / Test Results:   XR chest 2 views    Result Date: 6/14/2024  Impression: No acute cardiopulmonary disease. Redemonstration of partial right middle lobe atelectasis. Workstation performed: [id number]       No Chest XR results available for this patient.   Results for orders placed during the hospital encounter of 02/15/24    Echo complete w/ contrast if indicated    Interpretation Summary    Left Ventricle: Left ventricular cavity size is normal. Wall thickness is mildly increased. There is mild asymmetric hypertrophy of the basal septal wall. The left ventricular ejection fraction is 35%. Systolic function is severely reduced. Diastolic function is mildly abnormal, consistent with grade I (abnormal) relaxation.    The following segments are hypokinetic: mid anteroseptal, mid inferoseptal, apical anterior, apical septal, apical inferior and apex.    Mitral Valve: There is severe annular calcification.    Tricuspid Valve: There is mild regurgitation.    IVC/SVC: The inferior vena cava is dilated.    Pulmonary Artery: The estimated pulmonary artery systolic pressure is 40.0 mmHg. The pulmonary artery systolic pressure is mildly increased.      No results for input(s): \"BLOODCX\", \"SPUTUMCULTUR\", " "\"GRAMSTAIN\", \"URINECX\", \"WOUNDCULT\", \"BODYFLUIDCUL\", \"MRSACULTURE\", \"INFLUAPCR\", \"INFLUBPCR\", \"RSVPCR\", \"LEGIONELLAUR\", \"STPU\", \"CDIFFTOXINB\" in the last 72 hours.    Incidental Findings:   None other than noted above;I reviewed the above mentioned incidental findings with the patient and/or family and they expressed understanding.    Test Results Pending at Discharge (will require follow up):   none     Outpatient Tests Requested:   None    Complications: none    Reason for Admission:   Chief Complaint   Patient presents with    Failure To Thrive     Patient arrives to the ER from home via EMS with complaints of failure to thrive. Per family pt was hospitalized recently for sepsis (e-coli). GCS 14. 3lnc oxygen at home. Pt not eating/drinking and unable to ambulate x 2 days.         Hospital Course:   Lauren Quintero is a 78 y.o. female patient who originally presented to the hospital on 6/14/2024 due to generalized weakness, poor p.o intake and fatigue. Patient was recently  hospitalization (05/2024) due to septic shock requiring ICU level of care needing vasopressors secondary to E. coli bacteremia. She was discharged on 05/15/2024 to a rehab.  Patient was then discharged from rehab and within 3 days she had to return to the hospital due to weakness. In the ED patient was found to met SIRs criteria likely secondary to UTI. She was admitted at this time. UA and blood sample grew E.coli. she was given IV ceftriaxone, upon discharge she will be transitioned to p.o alternative . On 6/18/2024 her Hemoglobin was trending down, getting as low as 6.8. 1 U PRBc were ordered and given at this time. Hemoglobin has increased and stabilized at 8.6 as of today (6/21/2024). She is clinically stable with no current complaints. We tried to advise and recommend her to rehab but she is very admit at wanting to return home. Spoke with son in detail about this transition, he is okay with this as long as patient has home services.   The " "patient is being discharged home as per her preference in stable condition.  I consented the patient on a course of cephalexin for 7 more days to continue treatment for bacteremia and UTI.  Patient verbalized understanding peer  Patient being discharged in stable condition and should follow-up with primary care physician.    Please see above list of diagnoses and related plan for additional information.     Condition at Discharge: fair    Discharge Day Visit / Exam:   Subjective:      No acute events overnight per patient and nursing. Patient laying comfortably in bed during our encounter. Has no complaints, is eager to be discharged home.     Vitals: Blood Pressure: 138/71 (06/21/24 0803)  Pulse: 81 (06/21/24 0803)  Temperature: 97.5 °F (36.4 °C) (06/21/24 0803)  Temp Source: Oral (06/20/24 2138)  Respirations: 20 (06/20/24 2138)  Height: 5' 7\" (170.2 cm) (06/14/24 2243)  Weight - Scale: 56.1 kg (123 lb 10.9 oz) (06/14/24 2243)  SpO2: 96 % (06/21/24 0803)    Exam:   Physical Exam  Vitals and nursing note reviewed.   Constitutional:       General: She is not in acute distress.     Appearance: Normal appearance. She is normal weight. She is not ill-appearing, toxic-appearing or diaphoretic.      Comments: Pleasant elderly female in bed, awake   HENT:      Head: Normocephalic and atraumatic.      Right Ear: External ear normal.      Left Ear: External ear normal.      Nose: Nose normal. No congestion.      Mouth/Throat:      Mouth: Mucous membranes are dry.      Pharynx: Oropharynx is clear. No oropharyngeal exudate or posterior oropharyngeal erythema.   Eyes:      General: No scleral icterus.        Right eye: No discharge.         Left eye: No discharge.      Extraocular Movements: Extraocular movements intact.      Conjunctiva/sclera: Conjunctivae normal.      Pupils: Pupils are equal, round, and reactive to light.   Cardiovascular:      Rate and Rhythm: Normal rate and regular rhythm.      Pulses: Normal pulses. "      Heart sounds: Normal heart sounds. No murmur heard.     No friction rub. No gallop.   Pulmonary:      Effort: Pulmonary effort is normal. No respiratory distress.      Breath sounds: Normal breath sounds. No stridor. No wheezing, rhonchi or rales.   Chest:      Chest wall: No tenderness.   Abdominal:      General: Abdomen is flat. Bowel sounds are normal. There is no distension.      Palpations: Abdomen is soft. There is no mass.      Tenderness: There is no abdominal tenderness. There is no guarding or rebound.   Musculoskeletal:         General: No swelling, tenderness, deformity or signs of injury. Normal range of motion.      Cervical back: Normal range of motion and neck supple. No rigidity. No muscular tenderness.   Skin:     General: Skin is warm and dry.      Capillary Refill: Capillary refill takes less than 2 seconds.      Coloration: Skin is not jaundiced or pale.      Findings: No bruising, erythema, lesion or rash.   Neurological:      General: No focal deficit present.      Mental Status: She is alert and oriented to person, place, and time. Mental status is at baseline.      Cranial Nerves: No cranial nerve deficit.      Sensory: No sensory deficit.      Motor: No weakness.      Coordination: Coordination normal.   Psychiatric:         Mood and Affect: Mood normal.         Behavior: Behavior normal.         Thought Content: Thought content normal.         Judgment: Judgment normal.          Discussion with Family: Updated  (son) via phone.    Discharge instructions/Information to patient and family:   See after visit summary for information provided to patient and family.      Provisions for Follow-Up Care:  See after visit summary for information related to follow-up care and any pertinent home health orders.       Mobility at time of Discharge:   Basic Mobility Inpatient Raw Score: 17  -HLM Goal: 5: Stand one or more mins  JH-HLM Achieved: 4: Move to chair/commode  HLM Goal NOT  achieved. Continue to encourage mobility in post discharge setting.    Disposition:   Home with VNA Services (Reminder: Complete face to face encounter)    Planned Readmission: none     Discharge Statement:  The attending physician Kenny Stone,* spent 30+ minutes discharging the patient. This time was spent on the day of discharge. The attending physician had direct contact with the patient on the day of discharge. Greater than 50% of the total time was spent examining patient, answering all patient questions, arranging and discussing plan of care with patient as well as directly providing post-discharge instructions.  Additional time then spent on discharge activities.    Discharge Medications:  See after visit summary for reconciled discharge medications provided to patient and/or family.      **Please Note: This note may have been constructed using a voice recognition system**

## 2024-06-21 NOTE — ASSESSMENT & PLAN NOTE
Present on admission history anemia.  EGD in 2023 Mild erosive gastritis and Duodenal bulb angiectasia status post APC  Hemoglobin trending down 6/18/24 was 6.9 than 6.8 on 6/19 1U PRBc was ordered and administered on 6/19/2024  Hemoglobin has increased to 8.6 today (6/21/2024) which is stable from yesterday   She denies dark stools

## 2024-06-21 NOTE — ASSESSMENT & PLAN NOTE
Wt Readings from Last 3 Encounters:   06/14/24 56.1 kg (123 lb 10.9 oz)   06/11/24 54.9 kg (121 lb)   06/05/24 55.2 kg (121 lb 9.6 oz)       History of combined CHF with a previous EF of 35%, most recent EF has improved to 50%  Life vest discontinued   Appears euvolemic, Toprol and Entresto

## 2024-06-21 NOTE — PHYSICAL THERAPY NOTE
PHYSICAL THERAPY NOTE          Patient Name: Lauren Quintero  Today's Date: 6/21/2024 06/21/24 1156   PT Last Visit   PT Visit Date 06/21/24   Note Type   Note Type Treatment   Pain Assessment   Pain Assessment Tool 0-10   Pain Score No Pain   Restrictions/Precautions   Weight Bearing Precautions Per Order No   Other Precautions Chair Alarm;Bed Alarm;Fall Risk;O2  (4L O2 via NC)   General   Chart Reviewed Yes   Response to Previous Treatment Patient with no complaints from previous session.   Family/Caregiver Present No   Cognition   Overall Cognitive Status WFL   Arousal/Participation Alert;Cooperative   Attention Within functional limits   Orientation Level Oriented X4   Following Commands Follows one step commands without difficulty   Comments Pt agreeable to PT session   Bed Mobility   Supine to Sit 5  Supervision   Additional items Increased time required;Verbal cues   Transfers   Sit to Stand 4  Minimal assistance   Additional items Assist x 1;Increased time required;Verbal cues   Stand to Sit 4  Minimal assistance   Additional items Assist x 1;Increased time required;Verbal cues;Armrests   Additional Comments with RW   Ambulation/Elevation   Gait pattern Decreased hip extension;Decreased heel strike;Decreased toe off;Excessively slow;Short stride;Foward flexed;Decreased foot clearance   Gait Assistance 4  Minimal assist   Additional items Assist x 1;Verbal cues   Assistive Device Rolling walker   Distance 50'   Balance   Static Sitting Good   Dynamic Sitting Fair +   Static Standing Fair   Dynamic Standing Fair -   Ambulatory Fair -   Activity Tolerance   Activity Tolerance Patient tolerated treatment well;Patient limited by fatigue   Nurse Made Aware spoke to RN   Exercises   Quad Sets Sitting;20 reps;AROM;Bilateral   Glute Sets Sitting;20 reps;AROM;Bilateral   Hip Abduction Sitting;20 reps;AROM;Bilateral   Hip Adduction  Sitting;20 reps;AROM;Bilateral   Knee AROM Long Arc Quad Sitting;20 reps;AROM;Bilateral   Ankle Pumps Sitting;20 reps;AROM;Bilateral   Marching Sitting;20 reps;AROM;Bilateral   Assessment   Prognosis Good   Problem List Decreased strength;Decreased endurance;Impaired balance;Decreased mobility;Pain   Assessment Chart review and two person identifiers were completed. Pt seen for PT treatment session this date, consisting of ther ex focused on strengthening and gt training on level surfaces to improve pt safety in household environment. Since previous session, pt has made good progress in terms of increased distance ambulated. Pt was able to complete supine to sitting EOB with supervision and increased time. Pt denies any lightheadedness or dizziness with change in position. Pt completed STS with RW and min A x1 and ambulated 50'. Pt completed stand to sit with min Ax1 and armrests. Pt required rest break after ambulated before continuing with exercises. Pt completed all seated exercises well with no complaints of pain. Pt demonstrated fatigue with abduction exercise requiring brief rest group home through set of 10.  Pt ended session seated in recliner with alarm activated and all needs within reach. Spoke to RN about session outcomes. Pertinent barriers during this session include  fatigue . Current goals and POC established on IE remain appropriate. Pt prognosis for achieving goals is good, pending pt progress with hospitalization/medical status improvements, and indicated by ability to follow directions, responsive to cues/strategies, and previous response to intervention. Pt limited d/t limited family/caregiver support. Pt continues to be functioning below baseline level, and remains limited due to factors listed above. PT will continue to see pt during current hospitalization in order to address the deficits listed above and provide interventions consistent w/ POC in effort to achieve STGs. PT recommends level 2,  moderate resource intensity upon discharge.   Barriers to Discharge Inaccessible home environment;Decreased caregiver support   Goals   Patient Goals to go home   STG Expiration Date 06/25/24   PT Treatment Day 2   Plan   Treatment/Interventions Functional transfer training;Elevations;LE strengthening/ROM;Endurance training;Therapeutic exercise;Patient/family training;Equipment eval/education;Bed mobility;Gait training   Progress Progressing toward goals   PT Frequency 3-5x/wk   Discharge Recommendation   Rehab Resource Intensity Level, PT II (Moderate Resource Intensity)   Equipment Recommended Walker   AM-PAC Basic Mobility Inpatient   Turning in Flat Bed Without Bedrails 3   Lying on Back to Sitting on Edge of Flat Bed Without Bedrails 3   Moving Bed to Chair 3   Standing Up From Chair Using Arms 3   Walk in Room 3   Climb 3-5 Stairs With Railing 3   Basic Mobility Inpatient Raw Score 18   Basic Mobility Standardized Score 41.05   [facility] Highest Level Of Mobility   -HLM Goal 6: Walk 10 steps or more   -HLM Achieved 7: Walk 25 feet or more   Education   Education Provided Mobility training;Home exercise program;Assistive device   Patient Demonstrates acceptance/verbal understanding   End of Consult   Patient Position at End of Consult Bedside chair;Bed/Chair alarm activated;All needs within reach       Abhi Diane, PT, DPT

## 2024-06-21 NOTE — PLAN OF CARE
Problem: Prexisting or High Potential for Compromised Skin Integrity  Goal: Skin integrity is maintained or improved  Description: INTERVENTIONS:  - Identify patients at risk for skin breakdown  - Assess and monitor skin integrity  - Assess and monitor nutrition and hydration status  - Monitor labs   - Assess for incontinence   - Turn and reposition patient  - Assist with mobility/ambulation  - Relieve pressure over bony prominences  - Avoid friction and shearing  - Provide appropriate hygiene as needed including keeping skin clean and dry  - Evaluate need for skin moisturizer/barrier cream  - Collaborate with interdisciplinary team   - Patient/family teaching  - Consider wound care consult   Outcome: Progressing     Problem: PAIN - ADULT  Goal: Verbalizes/displays adequate comfort level or baseline comfort level  Description: Interventions:  - Encourage patient to monitor pain and request assistance  - Assess pain using appropriate pain scale  - Administer analgesics based on type and severity of pain and evaluate response  - Implement non-pharmacological measures as appropriate and evaluate response  - Consider cultural and social influences on pain and pain management  - Notify physician/advanced practitioner if interventions unsuccessful or patient reports new pain  Outcome: Progressing     Problem: INFECTION - ADULT  Goal: Absence or prevention of progression during hospitalization  Description: INTERVENTIONS:  - Assess and monitor for signs and symptoms of infection  - Monitor lab/diagnostic results  - Monitor all insertion sites, i.e. indwelling lines, tubes, and drains  - Monitor endotracheal if appropriate and nasal secretions for changes in amount and color  - [place] appropriate cooling/warming therapies per order  - Administer medications as ordered  - Instruct and encourage patient and family to use good hand hygiene technique  - Identify and instruct in appropriate isolation precautions for  identified infection/condition  Outcome: Progressing  Goal: Absence of fever/infection during neutropenic period  Description: INTERVENTIONS:  - Monitor WBC    Outcome: Progressing     Problem: SAFETY ADULT  Goal: Patient will remain free of falls  Description: INTERVENTIONS:  - Educate patient/family on patient safety including physical limitations  - Instruct patient to call for assistance with activity   - Consult OT/PT to assist with strengthening/mobility   - Keep Call bell within reach  - Keep bed low and locked with side rails adjusted as appropriate  - Keep care items and personal belongings within reach  - Initiate and maintain comfort rounds  - Make Fall Risk Sign visible to staff  - Offer Toileting every 2 Hours, in advance of need  - Initiate/Maintain alarm  - Obtain necessary fall risk management equipment:   - Apply yellow socks and bracelet for high fall risk patients  - Consider moving patient to room near nurses station  Outcome: Progressing  Goal: Maintain or return to baseline ADL function  Description: INTERVENTIONS:  -  Assess patient's ability to carry out ADLs; assess patient's baseline for ADL function and identify physical deficits which impact ability to perform ADLs (bathing, care of mouth/teeth, toileting, grooming, dressing, etc.)  - Assess/evaluate cause of self-care deficits   - Assess range of motion  - Assess patient's mobility; develop plan if impaired  - Assess patient's need for assistive devices and provide as appropriate  - Encourage maximum independence but intervene and supervise when necessary  - Involve family in performance of ADLs  - Assess for home care needs following discharge   - Consider OT consult to assist with ADL evaluation and planning for discharge  - Provide patient education as appropriate  Outcome: Progressing  Goal: Maintains/Returns to pre admission functional level  Description: INTERVENTIONS:  - Perform AM-PAC 6 Click Basic Mobility/ Daily Activity  assessment daily.  - Set and communicate daily mobility goal to care team and patient/family/caregiver.   - Collaborate with rehabilitation services on mobility goals if consulted  - Perform Range of Motion 3 times a day.  - Reposition patient every 2 hours.  - Dangle patient 3 times a day  - Stand patient 3 times a day  - Ambulate patient 3 times a day  - Out of bed to chair 3 times a day   - Out of bed for meals 3 times a day  - Out of bed for toileting  - Record patient progress and toleration of activity level   Outcome: Progressing     Problem: DISCHARGE PLANNING  Goal: Discharge to home or other facility with appropriate resources  Description: INTERVENTIONS:  - Identify barriers to discharge w/patient and caregiver  - Arrange for needed discharge resources and transportation as appropriate  - Identify discharge learning needs (meds, wound care, etc.)  - Arrange for interpretive services to assist at discharge as needed  - Refer to Case Management Department for coordinating discharge planning if the patient needs post-hospital services based on physician/advanced practitioner order or complex needs related to functional status, cognitive ability, or social support system  Outcome: Progressing     Problem: Knowledge Deficit  Goal: Patient/family/caregiver demonstrates understanding of disease process, treatment plan, medications, and discharge instructions  Description: Complete learning assessment and assess knowledge base.  Interventions:  - Provide teaching at level of understanding  - Provide teaching via preferred learning methods  Outcome: Progressing

## 2024-06-21 NOTE — PLAN OF CARE
Problem: PHYSICAL THERAPY ADULT  Goal: Performs mobility at highest level of function for planned discharge setting.  See evaluation for individualized goals.  Description: Treatment/Interventions: Functional transfer training, Elevations, LE strengthening/ROM, Endurance training, Therapeutic exercise, Patient/family training, Equipment eval/education, Bed mobility, Gait training  Equipment Recommended: Walker       See flowsheet documentation for full assessment, interventions and recommendations.  Outcome: Progressing  Note: Prognosis: Good  Problem List: Decreased strength, Decreased endurance, Impaired balance, Decreased mobility, Pain  Assessment: Chart review and two person identifiers were completed. Pt seen for PT treatment session this date, consisting of ther ex focused on strengthening and gt training on level surfaces to improve pt safety in household environment. Since previous session, pt has made good progress in terms of increased distance ambulated. Pt was able to complete supine to sitting EOB with supervision and increased time. Pt denies any lightheadedness or dizziness with change in position. Pt completed STS with RW and min A x1 and ambulated 50'. Pt completed stand to sit with min Ax1 and armrests. Pt required rest break after ambulated before continuing with exercises. Pt completed all seated exercises well with no complaints of pain. Pt demonstrated fatigue with abduction exercise requiring brief rest MCFP through set of 10.  Pt ended session seated in recliner with alarm activated and all needs within reach. Spoke to RN about session outcomes. Pertinent barriers during this session include  fatigue . Current goals and POC established on IE remain appropriate. Pt prognosis for achieving goals is good, pending pt progress with hospitalization/medical status improvements, and indicated by ability to follow directions, responsive to cues/strategies, and previous response to intervention.  Pt limited d/t limited family/caregiver support. Pt continues to be functioning below baseline level, and remains limited due to factors listed above. PT will continue to see pt during current hospitalization in order to address the deficits listed above and provide interventions consistent w/ POC in effort to achieve STGs. PT recommends level 2, moderate resource intensity upon discharge.  Barriers to Discharge: Inaccessible home environment, Decreased caregiver support     Rehab Resource Intensity Level, PT: II (Moderate Resource Intensity)    See flowsheet documentation for full assessment.

## 2024-06-24 ENCOUNTER — PATIENT OUTREACH (OUTPATIENT)
Dept: CASE MANAGEMENT | Facility: OTHER | Age: 79
End: 2024-06-24

## 2024-06-24 DIAGNOSIS — Z71.89 COMPLEX CARE COORDINATION: Primary | ICD-10-CM

## 2024-06-24 NOTE — PROGRESS NOTES
Outpatient Care Management Note:  OPCM referral received from [facility].  Chart review completed.  Patient was admitted to [facility] on 6/14/24 for sepsis 2/2 UTI.  Patient was cleared to discharge to home  on 6/21/24 with services from [facility] and with recommendation to continue cephalexin for 7 days and to follow up with PCP.      PMH includes:  COPD O2 dependent at 3 -4 LPM, CHF, CAD, CKD, HTN, depression    Future Appointments:  None noted in [facility]    Outreach call scheduled.

## 2024-06-25 ENCOUNTER — PATIENT OUTREACH (OUTPATIENT)
Dept: CASE MANAGEMENT | Facility: OTHER | Age: 79
End: 2024-06-25

## 2024-06-25 LAB — METHYLMALONATE SERPL-SCNC: 896 NMOL/L (ref 0–378)

## 2024-06-25 NOTE — PROGRESS NOTES
"Outpatient Care Management Note:  OPCM referral received from [facility].  Chart review completed.  Patient was admitted to [facility] on 6/14/24 for sepsis 2/2 UTI.  Patient was cleared to discharge to home  on 6/21/24 with services from [facility] and with recommendation to continue cephalexin for 7 days and to follow up with PCP.      PMH includes:  COPD O2 dependent at 3 -4 LPM, CHF, CAD, CKD, HTN, depression    Future Appointments:  None noted in [facility]    Telephone outreach attempted. Spoke with patient.  CM introduced self with explanation of purpose of phone call.  Patient informs me she is too tired to talk on the phone and requests that I speak to her son Imer.    Imer informs me patient has just returned from a medical visit with [facility] in [facility] ([phone]).  Son informs me patient is living with him at his home residence in [place].  Address in EPIC chart changed to reflect correct address and phone number.    Son reports patient sees Dr. Bateman, PCP.  Last OV was over 8 months ago.  Son states patient would like to switch to a  provider closer to [place].  Provided son with phone number to [facility] and encouraged son to contact office to schedule an appointment.  In the background, patient asks if she could also switch Hem/Onc doctors.  Son states he prefers to wait until she establishes with a PCP before switching her oncology care to a [facility] provider.     Son states he feels frustrated in being patient's main caregiver.  Son states \"I'm so frustrated right now.  She doesn't do what she's supposed to do.  She doesn't do her breathing treatments.  I tell her to drink more so she doesn't get UTIs, but she just won't do it\".  Son informs me he was the main caregiver for his father and now main caregiver for his mother.  Son reports there are other siblings \"But they are not stepping up\".  Son states he has his own health issues (recent knee " operation) and feels overwhelmed.  Initiated conversation re alternative home placements.  Son informs me patient is declining [facility] or SNF placement.  Will plan to discuss options further with son at a later time.    Have you been contacted by, or visited by, Home Care Services?     [facility].  Son states agency was to start [facility] services prior to last hospitalizations.  Son states he has not heard back from agency since patient returned home.  Son has not contacted agency.  Son requests that we terminate this phone call as he needs to leave to run a friend home.  May I follow up with you again?    Son agreeable to future outreach next week.  Phone call made to [facility].  Per  RN visit scheduled for 6/27/24. Request made that agency staff contact son to make him aware of this plan.

## 2024-06-27 PROBLEM — N18.32 STAGE 3B CHRONIC KIDNEY DISEASE (HCC): Status: ACTIVE | Noted: 2024-06-27

## 2024-06-27 PROBLEM — N18.9 CKD (CHRONIC KIDNEY DISEASE): Status: RESOLVED | Noted: 2024-02-21 | Resolved: 2024-06-27

## 2024-07-02 ENCOUNTER — PATIENT OUTREACH (OUTPATIENT)
Dept: CASE MANAGEMENT | Facility: OTHER | Age: 79
End: 2024-07-02

## 2024-07-02 DIAGNOSIS — Z71.89 ENCOUNTER FOR COORDINATION OF COMPLEX CARE: Primary | ICD-10-CM
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
## 2024-07-11 ENCOUNTER — PATIENT OUTREACH (OUTPATIENT)
Dept: CASE MANAGEMENT | Facility: OTHER | Age: 79
End: 2024-07-11

## 2024-07-11 NOTE — PROGRESS NOTES
[facility] Management Follow Up Note:  Followup inbasket reminder received.  Chart review completed.  Outside records through Care Everywhere requested and refreshed.   Telephone call made to [facility] for update on this case.  Was informed social work has not made a home visit and there did not appear to be one scheduled in the near future.  Requested a call back from Jorge Alberto COOLEY Case manager.  Left agency staff member my phone number for a call back.     Per chart review noted new patient appointment with Kesha Reno PA-C scheduled for 8/23/24.     Will continue to monitor.

## 2024-07-12 ENCOUNTER — PATIENT OUTREACH (OUTPATIENT)
Dept: CASE MANAGEMENT | Facility: OTHER | Age: 79
End: 2024-07-12

## 2024-07-12 NOTE — PROGRESS NOTES
"OutPatient Complex Care Management Note:  Incoming call from YASIR Azul CM at [facility].  Jorge Alberto informed me agency is aware of social issues and safety concerns.  Patient has been referred to SW with anticipated home visit \"today or early part of next week\".  Will continue to monitor.   "

## 2024-07-14 PROBLEM — A41.9 SEPSIS (HCC): Status: RESOLVED | Noted: 2024-05-02 | Resolved: 2024-07-14

## 2024-07-16 ENCOUNTER — PATIENT OUTREACH (OUTPATIENT)
Dept: CASE MANAGEMENT | Facility: OTHER | Age: 79
End: 2024-07-16

## 2024-07-16 NOTE — PROGRESS NOTES
[facility] Management Follow Up Note:  Followup inbasket reminder received.  Chart review completed.  Outside records through Care Everywhere requested and refreshed.     Telephone call attempted today for follow up.  No answer.    Left voicemail message with general contact information with name, title, call back phone number office hours when I am available and message encouraging son to return call.    Will continue to monitor.

## 2024-07-18 ENCOUNTER — PATIENT OUTREACH (OUTPATIENT)
Dept: CASE MANAGEMENT | Facility: OTHER | Age: 79
End: 2024-07-18

## 2024-07-18 NOTE — PROGRESS NOTES
SHELIA malone this am regarding patient. Chart review completed.  Patient concerns for safety in the home. Lauren 's son is going back to work and patient possibly left alone all day. PABLO BRADFORD reached out to Jorge [facility] with [facility]. Outreach to number listed, and call was completed. Jorge Alberto did answer the call and stated he did put in a order, however he was not sure if a SW had gone out yet. He asked me to follow up with [facility] direct number.     PABLO BRADFORD reached out to [facility] at [phone]. Call was completed. Spoke to Quin, had me on hold. She connected me to the nurse manager who informed me that their SW Neida has been out of the office but plans to do a visit next Monday.     PABLO BRADFORD will to follow up .     Update to Jessica SANCHEZ RN CM regarding same.

## 2024-07-19 ENCOUNTER — TELEPHONE (OUTPATIENT)
Age: 79
End: 2024-07-19

## 2024-07-19 ENCOUNTER — APPOINTMENT (EMERGENCY)
Dept: RADIOLOGY | Facility: HOSPITAL | Age: 79
End: 2024-07-19
Payer: MEDICARE

## 2024-07-19 ENCOUNTER — HOSPITAL ENCOUNTER (EMERGENCY)
Facility: HOSPITAL | Age: 79
Discharge: HOME | End: 2024-07-19
Attending: EMERGENCY MEDICINE
Payer: MEDICARE

## 2024-07-19 ENCOUNTER — APPOINTMENT (EMERGENCY)
Dept: CT IMAGING | Facility: HOSPITAL | Age: 79
End: 2024-07-19
Payer: MEDICARE

## 2024-07-19 ENCOUNTER — PATIENT OUTREACH (OUTPATIENT)
Dept: CASE MANAGEMENT | Facility: OTHER | Age: 79
End: 2024-07-19

## 2024-07-19 VITALS
DIASTOLIC BLOOD PRESSURE: 58 MMHG | RESPIRATION RATE: 12 BRPM | HEART RATE: 72 BPM | TEMPERATURE: 97.5 F | OXYGEN SATURATION: 96 % | SYSTOLIC BLOOD PRESSURE: 100 MMHG

## 2024-07-19 DIAGNOSIS — N39.0 UTI (URINARY TRACT INFECTION): Primary | ICD-10-CM

## 2024-07-19 DIAGNOSIS — N39.0 RECURRENT UTI: ICD-10-CM

## 2024-07-19 DIAGNOSIS — J96.11 CHRONIC HYPOXEMIC RESPIRATORY FAILURE (HCC): ICD-10-CM

## 2024-07-19 DIAGNOSIS — J44.9 COPD (CHRONIC OBSTRUCTIVE PULMONARY DISEASE) (HCC): ICD-10-CM

## 2024-07-19 DIAGNOSIS — E86.0 MILD DEHYDRATION: ICD-10-CM

## 2024-07-19 LAB
ALBUMIN SERPL BCG-MCNC: 3.6 G/DL (ref 3.5–5)
ALP SERPL-CCNC: 95 U/L (ref 34–104)
ALT SERPL W P-5'-P-CCNC: 3 U/L (ref 7–52)
ANION GAP SERPL CALCULATED.3IONS-SCNC: 3 MMOL/L (ref 4–13)
AST SERPL W P-5'-P-CCNC: 11 U/L (ref 13–39)
ATRIAL RATE: 75 BPM
BACTERIA UR QL AUTO: ABNORMAL /HPF
BASOPHILS # BLD AUTO: 0.03 THOUSANDS/ÂΜL (ref 0–0.1)
BASOPHILS NFR BLD AUTO: 1 % (ref 0–1)
BILIRUB SERPL-MCNC: 0.39 MG/DL (ref 0.2–1)
BILIRUB UR QL STRIP: NEGATIVE
BUN SERPL-MCNC: 28 MG/DL (ref 5–25)
CALCIUM SERPL-MCNC: 9.2 MG/DL (ref 8.4–10.2)
CHLORIDE SERPL-SCNC: 104 MMOL/L (ref 96–108)
CLARITY UR: ABNORMAL
CO2 SERPL-SCNC: 33 MMOL/L (ref 21–32)
COLOR UR: YELLOW
CREAT SERPL-MCNC: 1.88 MG/DL (ref 0.6–1.3)
EOSINOPHIL # BLD AUTO: 0.33 THOUSAND/ÂΜL (ref 0–0.61)
EOSINOPHIL NFR BLD AUTO: 6 % (ref 0–6)
ERYTHROCYTE [DISTWIDTH] IN BLOOD BY AUTOMATED COUNT: 15.2 % (ref 11.6–15.1)
GFR SERPL CREATININE-BSD FRML MDRD: 25 ML/MIN/1.73SQ M
GLUCOSE SERPL-MCNC: 73 MG/DL (ref 65–140)
GLUCOSE SERPL-MCNC: 80 MG/DL (ref 65–140)
GLUCOSE SERPL-MCNC: 80 MG/DL (ref 65–140)
GLUCOSE UR STRIP-MCNC: NEGATIVE MG/DL
HCT VFR BLD AUTO: 33.1 % (ref 34.8–46.1)
HGB BLD-MCNC: 9.7 G/DL (ref 11.5–15.4)
HGB UR QL STRIP.AUTO: ABNORMAL
IMM GRANULOCYTES # BLD AUTO: 0.01 THOUSAND/UL (ref 0–0.2)
IMM GRANULOCYTES NFR BLD AUTO: 0 % (ref 0–2)
INR PPP: 0.97 (ref 0.84–1.19)
KETONES UR STRIP-MCNC: NEGATIVE MG/DL
LACTATE SERPL-SCNC: 0.5 MMOL/L (ref 0.5–2)
LEUKOCYTE ESTERASE UR QL STRIP: ABNORMAL
LYMPHOCYTES # BLD AUTO: 2.19 THOUSANDS/ÂΜL (ref 0.6–4.47)
LYMPHOCYTES NFR BLD AUTO: 42 % (ref 14–44)
MCH RBC QN AUTO: 29.3 PG (ref 26.8–34.3)
MCHC RBC AUTO-ENTMCNC: 29.3 G/DL (ref 31.4–37.4)
MCV RBC AUTO: 100 FL (ref 82–98)
MONOCYTES # BLD AUTO: 0.59 THOUSAND/ÂΜL (ref 0.17–1.22)
MONOCYTES NFR BLD AUTO: 11 % (ref 4–12)
NEUTROPHILS # BLD AUTO: 2.11 THOUSANDS/ÂΜL (ref 1.85–7.62)
NEUTS SEG NFR BLD AUTO: 40 % (ref 43–75)
NITRITE UR QL STRIP: POSITIVE
NON-SQ EPI CELLS URNS QL MICRO: ABNORMAL /HPF
NRBC BLD AUTO-RTO: 0 /100 WBCS
P AXIS: 64 DEGREES
PH UR STRIP.AUTO: 5.5 [PH]
PLATELET # BLD AUTO: 171 THOUSANDS/UL (ref 149–390)
PMV BLD AUTO: 9.2 FL (ref 8.9–12.7)
POTASSIUM SERPL-SCNC: 5 MMOL/L (ref 3.5–5.3)
PR INTERVAL: 160 MS
PROCALCITONIN SERPL-MCNC: <0.05 NG/ML
PROT SERPL-MCNC: 8.3 G/DL (ref 6.4–8.4)
PROT UR STRIP-MCNC: ABNORMAL MG/DL
PROTHROMBIN TIME: 13.5 SECONDS (ref 11.6–14.5)
QRS AXIS: 74 DEGREES
QRSD INTERVAL: 90 MS
QT INTERVAL: 386 MS
QTC INTERVAL: 431 MS
RBC # BLD AUTO: 3.31 MILLION/UL (ref 3.81–5.12)
RBC #/AREA URNS AUTO: ABNORMAL /HPF
SODIUM SERPL-SCNC: 140 MMOL/L (ref 135–147)
SP GR UR STRIP.AUTO: 1.01 (ref 1–1.03)
T WAVE AXIS: 63 DEGREES
UROBILINOGEN UR STRIP-ACNC: <2 MG/DL
VENTRICULAR RATE: 75 BPM
WBC # BLD AUTO: 5.26 THOUSAND/UL (ref 4.31–10.16)
WBC #/AREA URNS AUTO: ABNORMAL /HPF
WBC CLUMPS # UR AUTO: PRESENT /UL

## 2024-07-19 PROCEDURE — 87077 CULTURE AEROBIC IDENTIFY: CPT | Performed by: PHYSICIAN ASSISTANT

## 2024-07-19 PROCEDURE — 85025 COMPLETE CBC W/AUTO DIFF WBC: CPT | Performed by: PHYSICIAN ASSISTANT

## 2024-07-19 PROCEDURE — 83605 ASSAY OF LACTIC ACID: CPT | Performed by: PHYSICIAN ASSISTANT

## 2024-07-19 PROCEDURE — 99285 EMERGENCY DEPT VISIT HI MDM: CPT | Performed by: PHYSICIAN ASSISTANT

## 2024-07-19 PROCEDURE — 93005 ELECTROCARDIOGRAM TRACING: CPT

## 2024-07-19 PROCEDURE — 93010 ELECTROCARDIOGRAM REPORT: CPT | Performed by: INTERNAL MEDICINE

## 2024-07-19 PROCEDURE — 71046 X-RAY EXAM CHEST 2 VIEWS: CPT

## 2024-07-19 PROCEDURE — 94640 AIRWAY INHALATION TREATMENT: CPT

## 2024-07-19 PROCEDURE — 84145 PROCALCITONIN (PCT): CPT | Performed by: PHYSICIAN ASSISTANT

## 2024-07-19 PROCEDURE — 36415 COLL VENOUS BLD VENIPUNCTURE: CPT | Performed by: PHYSICIAN ASSISTANT

## 2024-07-19 PROCEDURE — 87186 SC STD MICRODIL/AGAR DIL: CPT | Performed by: PHYSICIAN ASSISTANT

## 2024-07-19 PROCEDURE — 96361 HYDRATE IV INFUSION ADD-ON: CPT

## 2024-07-19 PROCEDURE — 80053 COMPREHEN METABOLIC PANEL: CPT | Performed by: PHYSICIAN ASSISTANT

## 2024-07-19 PROCEDURE — 81001 URINALYSIS AUTO W/SCOPE: CPT | Performed by: PHYSICIAN ASSISTANT

## 2024-07-19 PROCEDURE — 87086 URINE CULTURE/COLONY COUNT: CPT | Performed by: PHYSICIAN ASSISTANT

## 2024-07-19 PROCEDURE — 96360 HYDRATION IV INFUSION INIT: CPT

## 2024-07-19 PROCEDURE — 99285 EMERGENCY DEPT VISIT HI MDM: CPT

## 2024-07-19 PROCEDURE — 82948 REAGENT STRIP/BLOOD GLUCOSE: CPT

## 2024-07-19 PROCEDURE — 85610 PROTHROMBIN TIME: CPT | Performed by: PHYSICIAN ASSISTANT

## 2024-07-19 PROCEDURE — 74176 CT ABD & PELVIS W/O CONTRAST: CPT

## 2024-07-19 RX ORDER — CEPHALEXIN 250 MG/1
500 CAPSULE ORAL ONCE
Status: COMPLETED | OUTPATIENT
Start: 2024-07-19 | End: 2024-07-19

## 2024-07-19 RX ORDER — CEPHALEXIN 500 MG/1
500 CAPSULE ORAL EVERY 6 HOURS SCHEDULED
Qty: 28 CAPSULE | Refills: 0 | Status: SHIPPED | OUTPATIENT
Start: 2024-07-19 | End: 2024-07-26

## 2024-07-19 RX ORDER — IPRATROPIUM BROMIDE AND ALBUTEROL SULFATE 2.5; .5 MG/3ML; MG/3ML
3 SOLUTION RESPIRATORY (INHALATION) ONCE
Status: COMPLETED | OUTPATIENT
Start: 2024-07-19 | End: 2024-07-19

## 2024-07-19 RX ADMIN — SODIUM CHLORIDE 1000 ML: 0.9 INJECTION, SOLUTION INTRAVENOUS at 11:47

## 2024-07-19 RX ADMIN — IPRATROPIUM BROMIDE AND ALBUTEROL SULFATE 3 ML: 2.5; .5 SOLUTION RESPIRATORY (INHALATION) at 09:08

## 2024-07-19 RX ADMIN — CEPHALEXIN 500 MG: 250 CAPSULE ORAL at 11:45

## 2024-07-19 NOTE — ED PROVIDER NOTES
History  Chief Complaint   Patient presents with    Altered Mental Status     BIBA from home for complaints of altered mental status, symptoms started yesterday, also complaint of foul smelling urine, Hx. Of UTI and lymphoma.     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  She notes she was recently admitted for same.  She notes that She was admitted from 6/14-6/21 for generalized weakness and fatigue and having prior been addmitted on 5/15 to rehab and being readmitted for septic shock from UTI.  She had E coli bacteremia.  With her overall weakness she has been at home having declined PT.  She is ambulatory at home per her own history and as well as per her son.  She herself states that she believes she has another UTI and is having burning urination.  No fevers, chills or rigors.  No flank pain.  She otherwise finished her abx on 6/28.    Prior to Admission Medications   Prescriptions Last Dose Informant Patient Reported? Taking?   Calcium 500 MG tablet   No No   Sig: Take 1 tablet (500 mg total) by mouth 2 (two) times a day for 15 days Do not start before April 29, 2024.   Zanubrutinib 80 MG CAPS   Yes No   Sig: Take 160 mg by mouth 2 (two) times a day   Patient not taking: Reported on 6/14/2024   aspirin (ECOTRIN LOW STRENGTH) 81 mg EC tablet   No No   Sig: Take 1 tablet (81 mg total) by mouth daily for 15 days Do not start before April 29, 2024.   buPROPion (WELLBUTRIN) 75 mg tablet   No No   Sig: Take 1 tablet (75 mg total) by mouth in the morning for 15 days Do not start before April 29, 2024.   cariprazine (Vraylar) 1.5 MG capsule   No No   Sig: Take 1 capsule (1.5 mg total) by mouth daily for 15 days Do not start before April 29, 2024.   gabapentin (NEURONTIN) 100 mg capsule   No No   Sig: Take 1 capsule (100 mg total) by mouth  daily at bedtime for 15 days Do not start before April 29, 2024.   ipratropium (ATROVENT) 0.02 % nebulizer solution   No No   Sig: Take 2.5 mL (0.5 mg total) by nebulization 3 (three) times a day for 15 days Do not start before April 29, 2024.   metoprolol succinate (TOPROL-XL) 100 mg 24 hr tablet   No No   Sig: Take 1 tablet (100 mg total) by mouth daily   nitroglycerin (NITROSTAT) 0.4 mg SL tablet   No No   Sig: Place 1 tablet (0.4 mg total) under the tongue every 5 (five) minutes as needed for chest pain for up to 15 days Do not start before April 29, 2024.   pantoprazole (PROTONIX) 40 mg tablet   No No   Sig: Take 1 tablet (40 mg total) by mouth daily for 15 doses Do not start before April 29, 2024.   pravastatin (PRAVACHOL) 80 mg tablet   No No   Sig: Take 1 tablet (80 mg total) by mouth every evening for 15 days Do not start before April 29, 2024.   sacubitril-valsartan (Entresto) 24-26 MG TABS   No No   Sig: Take 1 tablet by mouth 2 (two) times a day for 30 doses Do not start before April 29, 2024.   umeclidinium-vilanterol (Anoro Ellipta) 62.5-25 mcg/actuation inhaler   No No   Sig: Inhale 1 puff daily   vilazodone (VIIBRYD) 40 mg tablet   No No   Sig: Take 1 tablet (40 mg total) by mouth daily with breakfast for 15 days Do not start before April 29, 2024.      Facility-Administered Medications: None       Past Medical History:   Diagnosis Date    Acute congestive heart failure (HCC) 08/27/2021    Anxiety     Cardiac disease     Chest pain 08/27/2021    Chronic pain disorder     CKD (chronic kidney disease) 02/21/2024    COPD (chronic obstructive pulmonary disease) ([facility])     COVID-19 02/15/2024    Depression     Heart disease     Hyperlipidemia     Hypertension     MI (myocardial infarction) ([facility])     MRSA (methicillin resistant Staphylococcus aureus)     Renal disorder     benign kidney tumor       Past Surgical History:   Procedure Laterality Date    APPENDECTOMY      CARDIAC CATHETERIZATION N/A 2/21/2024     Procedure: Cardiac catheterization;  Surgeon: Luke Malagon MD;  Location: [facility];  Service: Cardiology    CARDIAC CATHETERIZATION N/A 2024    Procedure: Cardiac Coronary Angiogram;  Surgeon: Luke Malagon MD;  Location: [facility];  Service: Cardiology    CARDIAC CATHETERIZATION Left 2024    Procedure: Cardiac Left Heart Cath;  Surgeon: Luke Malagon MD;  Location: [facility];  Service: Cardiology    ELBOW BURSA SURGERY Left 2018    D/T MRSA INFECTION    IR THORACENTESIS  2024    SPINAL FUSION      L7 L9       Family History   Problem Relation Age of Onset    Heart disease Mother     Cancer Father      I have reviewed and agree with the history as documented.    E-Cigarette/Vaping    E-Cigarette Use Never User      E-Cigarette/Vaping Substances    Nicotine No     THC No     CBD No     Flavoring No     Other No     Unknown No      Social History     Tobacco Use    Smoking status: Former     Current packs/day: 0.00     Average packs/day: 1 pack/day for 60.0 years (60.0 ttl pk-yrs)     Types: Cigarettes     Start date:      Quit date:      Years since quittin.5    Smokeless tobacco: Never    Tobacco comments:     She has close to a 60 pack-year smoking history, most recently has cut down to 4-5 cigarettes daily   Vaping Use    Vaping status: Never Used   Substance Use Topics    Alcohol use: Never    Drug use: No       Review of Systems   Constitutional:  Negative for chills and fever.   HENT:  Negative for ear pain and sore throat.    Eyes:  Negative for pain and visual disturbance.   Respiratory:  Negative for cough and shortness of breath.    Cardiovascular:  Negative for chest pain and palpitations.   Gastrointestinal:  Negative for abdominal pain and vomiting.   Genitourinary:  Positive for dysuria. Negative for hematuria.   Musculoskeletal:  Negative for arthralgias and back pain.   Skin:  Negative for color change and rash.    Neurological:  Negative for seizures and syncope.   All other systems reviewed and are negative.      Physical Exam  Physical Exam  Vitals (avs wnl) and nursing note reviewed.   Constitutional:       General: She is not in acute distress.     Appearance: She is well-developed.   HENT:      Head: Normocephalic and atraumatic.   Eyes:      Conjunctiva/sclera: Conjunctivae normal.   Cardiovascular:      Rate and Rhythm: Normal rate and regular rhythm.      Heart sounds: No murmur heard.  Pulmonary:      Effort: Pulmonary effort is normal. No respiratory distress.      Breath sounds: No decreased air movement or transmitted upper airway sounds. Examination of the right-lower field reveals wheezing. Wheezing present. No rhonchi or rales.      Comments: 94% on 4 L   Abdominal:      Palpations: Abdomen is soft.      Tenderness: There is no abdominal tenderness.   Musculoskeletal:         General: No swelling.      Cervical back: Neck supple.   Skin:     General: Skin is warm and dry.      Capillary Refill: Capillary refill takes less than 2 seconds.   Neurological:      Mental Status: She is alert.   Psychiatric:         Mood and Affect: Mood normal.         Vital Signs  ED Triage Vitals   Temperature Pulse Respirations Blood Pressure SpO2   07/19/24 0851 07/19/24 0851 07/19/24 0851 07/19/24 0851 07/19/24 0901   98 °F (36.7 °C) 63 16 111/67 96 %      Temp Source Heart Rate Source Patient Position - Orthostatic VS BP Location FiO2 (%)   07/19/24 0851 07/19/24 0851 07/19/24 0851 07/19/24 0851 --   Oral Monitor Sitting Right arm       Pain Score       --                  Vitals:    07/19/24 1030 07/19/24 1130 07/19/24 1315 07/19/24 1352   BP: 103/62 120/62  100/58   Pulse: 75 77 73 72   Patient Position - Orthostatic VS: Sitting Lying  Sitting         Visual Acuity      ED Medications  Medications   ipratropium-albuterol (DUO-NEB) 0.5-2.5 mg/3 mL inhalation solution 3 mL (3 mL Nebulization Given 7/19/24 0908)   cephalexin  (KEFLEX) capsule 500 mg (500 mg Oral Given 7/19/24 1145)   sodium chloride 0.9 % bolus 1,000 mL (1,000 mL Intravenous New Bag 7/19/24 1147)       Diagnostic Studies  Results Reviewed       Procedure Component Value Units Date/Time    Urine Microscopic [[id number]]  (Abnormal) Collected: 07/19/24 1102    Lab Status: Final result Specimen: Urine, Straight Cath Updated: 07/19/24 1113     RBC, UA 4-10 /hpf      WBC, UA Innumerable /hpf      Epithelial Cells None Seen /hpf      Bacteria, UA None Seen /hpf      WBC Clumps Present    Urine culture [[id number]] Collected: 07/19/24 1102    Lab Status: In process Specimen: Urine, Straight Cath Updated: 07/19/24 1113    UA w Reflex to Microscopic w Reflex to Culture [[id number]]  (Abnormal) Collected: 07/19/24 1102    Lab Status: Final result Specimen: Urine, Straight Cath Updated: 07/19/24 1111     Color, UA Yellow     Clarity, UA Extra Turbid     Specific Gravity, UA 1.012     pH, UA 5.5     Leukocytes, UA Large     Nitrite, UA Positive     Protein, UA 50 (1+) mg/dl      Glucose, UA Negative mg/dl      Ketones, UA Negative mg/dl      Urobilinogen, UA <2.0 mg/dl      Bilirubin, UA Negative     Occult Blood, UA Small    Procalcitonin [[id number]]  (Normal) Collected: 07/19/24 0900    Lab Status: Final result Specimen: Blood from Arm, Left Updated: 07/19/24 0941     Procalcitonin <0.05 ng/ml     Lactic acid [[id number]]  (Normal) Collected: 07/19/24 0900    Lab Status: Final result Specimen: Blood from Arm, Left Updated: 07/19/24 0927     LACTIC ACID 0.5 mmol/L     Narrative:      Result may be elevated if tourniquet was used during collection.    Comprehensive metabolic panel [[id number]]  (Abnormal) Collected: 07/19/24 0900    Lab Status: Final result Specimen: Blood from Arm, Left Updated: 07/19/24 0927     Sodium 140 mmol/L      Potassium 5.0 mmol/L      Chloride 104 mmol/L      CO2 33 mmol/L      ANION GAP 3 mmol/L      BUN 28 mg/dL      Creatinine 1.88 mg/dL      Glucose 80  mg/dL      Calcium 9.2 mg/dL      AST 11 U/L      ALT 3 U/L      Alkaline Phosphatase 95 U/L      Total Protein 8.3 g/dL      Albumin 3.6 g/dL      Total Bilirubin 0.39 mg/dL      eGFR 25 ml/min/1.73sq m     Narrative:      National Kid[employer] Foundation guidelines for Chronic Kidney Disease (CKD):     Stage 1 with normal or high GFR (GFR > 90 mL/min/1.73 square meters)    Stage 2 Mild CKD (GFR = 60-89 mL/min/1.73 square meters)    Stage 3A Moderate CKD (GFR = 45-59 mL/min/1.73 square meters)    Stage 3B Moderate CKD (GFR = 30-44 mL/min/1.73 square meters)    Stage 4 Severe CKD (GFR = 15-29 mL/min/1.73 square meters)    Stage 5 End Stage CKD (GFR <15 mL/min/1.73 square meters)  Note: GFR calculation is accurate only with a steady state creatinine    Protime-INR [[id number]]  (Normal) Collected: 07/19/24 0900    Lab Status: Final result Specimen: Blood from Arm, Left Updated: 07/19/24 0921     Protime 13.5 seconds      INR 0.97    CBC and differential [[id number]]  (Abnormal) Collected: 07/19/24 0900    Lab Status: Final result Specimen: Blood from Arm, Left Updated: 07/19/24 0909     WBC 5.26 Thousand/uL      RBC 3.31 Million/uL      Hemoglobin 9.7 g/dL      Hematocrit 33.1 %       fL      MCH 29.3 pg      MCHC 29.3 g/dL      RDW 15.2 %      MPV 9.2 fL      Platelets 171 Thousands/uL      nRBC 0 /100 WBCs      Segmented % 40 %      Immature Grans % 0 %      Lymphocytes % 42 %      Monocytes % 11 %      Eosinophils Relative 6 %      Basophils Relative 1 %      Absolute Neutrophils 2.11 Thousands/µL      Absolute Immature Grans 0.01 Thousand/uL      Absolute Lymphocytes 2.19 Thousands/µL      Absolute Monocytes 0.59 Thousand/µL      Eosinophils Absolute 0.33 Thousand/µL      Basophils Absolute 0.03 Thousands/µL     Fingerstick Glucose (POCT) [[id number]]  (Normal) Collected: 07/19/24 0857    Lab Status: Final result Specimen: Blood Updated: 07/19/24 0858     POC Glucose 80 mg/dl     Fingerstick Glucose (POCT)  [[id number]]  (Normal) Collected: 07/19/24 0854    Lab Status: Final result Specimen: Blood Updated: 07/19/24 0855     POC Glucose 73 mg/dl                    CT abdomen pelvis wo contrast   Final Result by Andrés Chappell MD (07/19 1314)      1. No acute abnormality in the abdomen or pelvis.   2. Nonemergent findings as noted.         Workstation performed: [id number]         XR chest 2 views   Final Result by Lisha Rivers MD (07/19 1215)      No acute cardiopulmonary disease.            Workstation performed: [id number]                    Procedures  ECG 12 Lead Documentation Only    Date/Time: 7/19/2024 2:43 PM    Performed by: Luis Downs PA-C  Authorized by: Luis Downs PA-C    Indications / Diagnosis:  GENERALIZED WEAKNESS  ECG reviewed by me, the ED Provider: yes    Patient location:  ED  Previous ECG:     Comparison to cardiac monitor: Yes    Interpretation:     Interpretation: normal    Rate:     ECG rate:  75    ECG rate assessment: normal    Rhythm:     Rhythm: sinus rhythm    Ectopy:     Ectopy: none    QRS:     QRS axis:  Normal  Conduction:     Conduction: normal    ST segments:     ST segments:  Normal  T waves:     T waves: normal    Comments:      Self interpreted by me. No STEMI.  Normal ECG           ED Course  ED Course as of 07/19/24 1456   Fri Jul 19, 2024   0854 W/u initiated.   1131 Urine w/ + markers for UTI similar to prior > either may not have cleared   1315 Pending CT                                 SBIRT 22yo+      Flowsheet Row Most Recent Value   Initial Alcohol Screen: US AUDIT-C     1. How often do you have a drink containing alcohol? 0 Filed at: 07/19/2024 0901   2. How many drinks containing alcohol do you have on a typical day you are drinking?  0 Filed at: 07/19/2024 0901   3a. Male UNDER 65: How often do you have five or more drinks on one occasion? 0 Filed at: 07/19/2024 0901   3b. FEMALE Any Age, or MALE 65+: How often do you have 4 or more  drinks on one occassion? 0 Filed at: 07/19/2024 0901   Audit-C Score 0 Filed at: 07/19/2024 0901   NIA: How many times in the past year have you...    Used an illegal drug or used a prescription medication for non-medical reasons? Never Filed at: 07/19/2024 0901                      Medical Decision Making  Patient without significant acute findings on examination or CT imaging.  Possible mild dehydration w/ borderline DARLING. Obtained CT to r/o obstructive uropathy.  No renal stone.  No pyelonephritis.  Patient w/ + urinary markers for UTI.  Plan for treatment into the outpatient setting.  Pt otherwise without markers for sepsis.  Procal and lactate normal.  In consideration of dysuria will treat for UTI.  Gave abx here in ED and Rx for Keflex into the outpatient setting based on prior sensitivities w/ Ecoli card sensitive.  Discussed w/ patient and son for RTED if any newly developing or worsening s/s.  Borderline DARLING would otherwise resolve with fluid challenge given in consideration of no obstructive uropathy.  Had mild singular episode of wheezing on RLL lungs on arrival treated w/ nebs / non recurrent.  CXR negative for acute findings.    Amount and/or Complexity of Data Reviewed  Labs: ordered.  Radiology: ordered.    Risk  Prescription drug management.                 Disposition  Final diagnoses:   UTI (urinary tract infection)   Mild dehydration     Time reflects when diagnosis was documented in both MDM as applicable and the Disposition within this note       Time User Action Codes Description Comment    7/19/2024  8:55 AM Luis Downs Add [R41.82] Altered mental status, unspecified altered mental status type     7/19/2024  2:33 PM Luis Downs Remove [R41.82] Altered mental status, unspecified altered mental status type     7/19/2024  2:33 PM Luis Downs Add [N39.0] UTI (urinary tract infection)     7/19/2024  2:33 PM Luis Downs Add [E86.0] Mild dehydration           ED Disposition        ED Disposition   Discharge    Condition   Stable    Date/Time   Fri Jul 19, 2024 1400    Comment   Lauren Quintero discharge to home/self care.                   Follow-up Information       Follow up With Specialties Details Why Contact Info    Starla Bateman MD Internal Medicine Call today Call today for routine follow up. [address] [place] [id number]  [phone]              Current Discharge Medication List        START taking these medications    Details   cephalexin (KEFLEX) 500 mg capsule Take 1 capsule (500 mg total) by mouth every 6 (six) hours for 7 days  Qty: 28 capsule, Refills: 0    Associated Diagnoses: UTI (urinary tract infection)           CONTINUE these medications which have NOT CHANGED    Details   aspirin (ECOTRIN LOW STRENGTH) 81 mg EC tablet Take 1 tablet (81 mg total) by mouth daily for 15 days Do not start before April 29, 2024.  Qty: 15 tablet, Refills: 0    Associated Diagnoses: COPD (chronic obstructive pulmonary disease) (HCC)      buPROPion (WELLBUTRIN) 75 mg tablet Take 1 tablet (75 mg total) by mouth in the morning for 15 days Do not start before April 29, 2024.  Qty: 15 tablet, Refills: 0    Associated Diagnoses: Major depressive disorder, recurrent episode, moderate (HCC)      Calcium 500 MG tablet Take 1 tablet (500 mg total) by mouth 2 (two) times a day for 15 days Do not start before April 29, 2024.  Qty: 30 tablet, Refills: 0    Associated Diagnoses: Moderate protein-calorie malnutrition (HCC)      cariprazine (Vraylar) 1.5 MG capsule Take 1 capsule (1.5 mg total) by mouth daily for 15 days Do not start before April 29, 2024.  Qty: 15 capsule, Refills: 0    Associated Diagnoses: Major depressive disorder, recurrent episode, moderate (HCC)      gabapentin (NEURONTIN) 100 mg capsule Take 1 capsule (100 mg total) by mouth daily at bedtime for 15 days Do not start before April 29, 2024.  Qty: 15 capsule, Refills: 0    Associated Diagnoses: Pain       ipratropium (ATROVENT) 0.02 % nebulizer solution Take 2.5 mL (0.5 mg total) by nebulization 3 (three) times a day for 15 days Do not start before April 29, 2024.  Qty: 112.5 mL, Refills: 0    Associated Diagnoses: COPD with acute exacerbation ([facility])      metoprolol succinate (TOPROL-XL) 100 mg 24 hr tablet Take 1 tablet (100 mg total) by mouth daily    Associated Diagnoses: Non-ST elevation MI (NSTEMI) ([facility])      nitroglycerin (NITROSTAT) 0.4 mg SL tablet Place 1 tablet (0.4 mg total) under the tongue every 5 (five) minutes as needed for chest pain for up to 15 days Do not start before April 29, 2024.  Qty: 15 tablet, Refills: 0    Associated Diagnoses: Coronary artery disease of native artery of native heart with stable angina pectoris ([facility])      pantoprazole (PROTONIX) 40 mg tablet Take 1 tablet (40 mg total) by mouth daily for 15 doses Do not start before April 29, 2024.  Qty: 15 tablet, Refills: 0    Associated Diagnoses: Anemia, unspecified type      pravastatin (PRAVACHOL) 80 mg tablet Take 1 tablet (80 mg total) by mouth every evening for 15 days Do not start before April 29, 2024.  Qty: 15 tablet, Refills: 0    Associated Diagnoses: Elevated troponin      sacubitril-valsartan (Entresto) 24-26 MG TABS Take 1 tablet by mouth 2 (two) times a day for 30 doses Do not start before April 29, 2024.  Qty: 30 tablet, Refills: 0    Associated Diagnoses: Chronic combined systolic and diastolic CHF (congestive heart failure) ([facility])      umeclidinium-vilanterol (Anoro Ellipta) 62.5-25 mcg/actuation inhaler Inhale 1 puff daily    Associated Diagnoses: Chronic obstructive pulmonary disease, unspecified COPD type ([facility])      vilazodone (VIIBRYD) 40 mg tablet Take 1 tablet (40 mg total) by mouth daily with breakfast for 15 days Do not start before April 29, 2024.  Qty: 15 tablet, Refills: 0    Associated Diagnoses: Major depressive disorder, recurrent episode, moderate ([facility])      Zanubrutinib 80 MG CAPS Take 160 mg by mouth 2  (two) times a day             No discharge procedures on file.    PDMP Review         Value Time User    PDMP Reviewed  Yes 11/21/2023  9:21 AM Uche Purdy MD            ED Provider  Electronically Signed by             Luis Downs PA-C  07/19/24 8232       Luis Downs PA-C  07/19/24 5098

## 2024-07-19 NOTE — TELEPHONE ENCOUNTER
Son of pt calling to schedule NP appt for recurrent UTIs. Pt was seen in ED today for UTI and had CT scan completed which showed:    IMPRESSION:     1. No acute abnormality in the abdomen or pelvis.  2. Nonemergent findings as noted.    Pt scheduled for next available appt in [place] on 8/29.

## 2024-07-19 NOTE — Clinical Note
Lauren Quintero was seen and treated in our emergency department on 7/19/2024.                Diagnosis:     Lauren  .    She may return on this date:          If you have any questions or concerns, please don't hesitate to call.      Jerad Pimentel MD    ______________________________           _______________          _______________  Hospital Representative                              Date                                Time

## 2024-07-19 NOTE — PROGRESS NOTES
OP.CM SW received ADT alert regarding patient. Chart review completed. Lauren presented to  [facility] ED on 07/19 for an altered mental status. She is presently being evaluated. Will continue to follow

## 2024-07-19 NOTE — DISCHARGE INSTRUCTIONS
Discussed to call to schedule a follow up appointment w/ referred clinicians post ED follow up to address non emergent follow up findings and to schedule follow up exam.  Discussed return emergency department for any newly developing or worsening signs or symptoms.  Patient understood all instructions prior to discharge and plan agreed upon by patient and myself.   Discussed overall common severe and commonly common side effects of prescription medication and advised review of all prescription package inserts for personal review prior to taking prescribed medications.

## 2024-07-21 LAB — BACTERIA UR CULT: ABNORMAL

## 2024-07-24 ENCOUNTER — PATIENT OUTREACH (OUTPATIENT)
Dept: CASE MANAGEMENT | Facility: OTHER | Age: 79
End: 2024-07-24

## 2024-07-24 NOTE — PROGRESS NOTES
"Outpatient Care Management Note:  Inbasket  ADT ER alert received. Chart review completed.    Patient was evaluated at [facility] ED department on 7/19/24 for complaints of altered mental status and foul smelling urine.  Patient was diagnosed with UTI and was treated accordingly and discharged to home.    Per AVS patient was instructed to initiate keflex and follow up with PCP and urology.    Future appointment with Uro on  8/29/24 noted.  Future appointment with John DE LUNA noted on 8/23/24.     Telephone outreach attempt made  Spoke with son.   Son informs me is out running errands and requests a call back in 30 minutes.  Second phone call attempted.  Spoke with son.  Son informs me patient is doing \"better\" since her ED visit.  Patient is taking her oral antibiotic daily as prescribed.    Son verbalized frustration of caregiving for his mother and his wish that patient \"would do more for herself\".  Son confirmed SW from [facility] had made 2 visits to the home.  Attentive listening and emotional support provided.     Phone call made to John DE LUNA to inquire about a sooner appointment.  Was informed that there is currently only 1 provider in the office.  Patient 's name placed on cancellation list should an appointment become available.     Will continue to monitor and plan for future outreach in 1 month. IB reminder set.   "

## 2024-07-25 ENCOUNTER — TELEPHONE (OUTPATIENT)
Age: 79
End: 2024-07-25

## 2024-07-25 NOTE — TELEPHONE ENCOUNTER
Olga of [facility] called regarding this patient.  She will be faxing over a plan of care. She would like to know if Kesha would be able to sign off on any issues that my arise - Patient is coming in as a new patient on 8/23.  Her old PCP is not available as he is involved in some legal issues.    Olga can be reached at [phone].  You may leave a message.

## 2024-07-31 ENCOUNTER — PATIENT OUTREACH (OUTPATIENT)
Dept: CASE MANAGEMENT | Facility: OTHER | Age: 79
End: 2024-07-31

## 2024-07-31 NOTE — PROGRESS NOTES
OP.CM PABLO received in basket reminder to follow up with patient. Chart review completed. PABLO BRADFORD reached out to [facility] to follow up with them regarding the SW visit. Left a voicemail with [facility]'s SW to give me a call back. Will remain available for return call.

## 2024-08-02 ENCOUNTER — TELEPHONE (OUTPATIENT)
Dept: FAMILY MEDICINE CLINIC | Facility: CLINIC | Age: 79
End: 2024-08-02

## 2024-08-02 NOTE — TELEPHONE ENCOUNTER
Pts son Imer walked in to confirm Lauren's appt on 8/23/2024 with Kesha, pt will be a NP/transferring to a new provider.       All questions/concerns were answered, will call the office if needed.

## 2024-08-19 ENCOUNTER — RA CDI HCC (OUTPATIENT)
Dept: OTHER | Facility: HOSPITAL | Age: 79
End: 2024-08-19

## 2024-08-21 ENCOUNTER — TELEPHONE (OUTPATIENT)
Dept: ADMINISTRATIVE | Facility: OTHER | Age: 79
End: 2024-08-21

## 2024-08-21 NOTE — TELEPHONE ENCOUNTER
Upon review of the In Basket request we were able to locate, review, and update the patient chart as requested for Hepatitis C .    Any additional questions or concerns should be emailed to the Practice Liaisons via the appropriate education email address, please do not reply via In Basket.    Thank you  Neida Leone MA   [facility]

## 2024-08-21 NOTE — TELEPHONE ENCOUNTER
----- Message from Jolie HAMMONDS sent at 8/20/2024  3:14 PM EDT -----  Regarding: Hep C  08/20/24 3:14 PM    Hello, our patient attached above has had Hepatitis C completed/performed. Please assist in updating the patient chart by pulling the Care Everywhere (CE) document. The date of service is 9/21/2020.     Thank you,  Jolie Sotelo [facility]

## 2024-08-30 PROBLEM — R26.9 UNSPECIFIED ABNORMALITIES OF GAIT AND MOBILITY: Status: ACTIVE | Noted: 2024-01-30

## 2024-08-30 PROBLEM — J96.01 ACUTE RESPIRATORY FAILURE WITH HYPOXIA AND HYPERCAPNIA (HCC): Chronic | Status: ACTIVE | Noted: 2022-06-14

## 2024-08-30 PROBLEM — J32.9 CHRONIC SINUSITIS: Status: ACTIVE | Noted: 2024-08-30

## 2024-08-30 PROBLEM — Z86.79 HISTORY OF CORONARY ARTERY DISEASE: Chronic | Status: ACTIVE | Noted: 2020-09-20

## 2024-08-30 PROBLEM — G47.10 HYPERSOMNIA: Status: ACTIVE | Noted: 2024-08-30

## 2024-08-30 PROBLEM — Z99.81 ON HOME O2: Status: ACTIVE | Noted: 2024-01-06

## 2024-08-30 PROBLEM — J10.1 INFLUENZA A: Status: ACTIVE | Noted: 2022-12-19

## 2024-08-30 PROBLEM — Z86.010 HISTORY OF COLONIC POLYPS: Status: ACTIVE | Noted: 2024-08-30

## 2024-08-30 PROBLEM — J18.9 RIGHT LOWER LOBE PNEUMONIA: Status: ACTIVE | Noted: 2022-12-10

## 2024-08-30 PROBLEM — K57.30 DIVERTICULOSIS OF LARGE INTESTINE WITHOUT PERFORATION OR ABSCESS WITHOUT BLEEDING: Status: ACTIVE | Noted: 2024-08-30

## 2024-08-30 PROBLEM — L66.4: Status: ACTIVE | Noted: 2024-08-30

## 2024-08-30 PROBLEM — F41.9 ANXIETY DISORDER: Status: ACTIVE | Noted: 2024-01-30

## 2024-08-30 PROBLEM — D72.829 LEUKOCYTOSIS: Status: ACTIVE | Noted: 2020-09-20

## 2024-08-30 PROBLEM — C85.90 LYMPHOMA (HCC): Status: ACTIVE | Noted: 2022-12-10

## 2024-08-30 PROBLEM — F32.A DEPRESSION: Chronic | Status: ACTIVE | Noted: 2020-09-20

## 2024-08-30 PROBLEM — K64.0 FIRST DEGREE HEMORRHOIDS: Status: ACTIVE | Noted: 2024-08-30

## 2024-08-30 PROBLEM — M54.14 THORACIC RADICULOPATHY: Status: ACTIVE | Noted: 2024-08-30

## 2024-08-30 PROBLEM — R45.1 AGITATION: Status: ACTIVE | Noted: 2022-12-20

## 2024-08-30 PROBLEM — G47.33 OBSTRUCTIVE SLEEP APNEA SYNDROME: Status: ACTIVE | Noted: 2024-08-30

## 2024-08-30 PROBLEM — K58.9 IRRITABLE BOWEL SYNDROME: Chronic | Status: ACTIVE | Noted: 2020-09-21

## 2024-08-30 PROBLEM — F33.9 MAJOR DEPRESSIVE DISORDER, RECURRENT, UNSPECIFIED (HCC): Status: ACTIVE | Noted: 2024-01-30

## 2024-08-30 PROBLEM — J96.20 ACUTE AND CHRONIC RESPIRATORY FAILURE, UNSPECIFIED WHETHER WITH HYPOXIA OR HYPERCAPNIA (HCC): Status: ACTIVE | Noted: 2024-01-30

## 2024-08-30 PROBLEM — E56.9 VITAMIN DEFICIENCY: Status: ACTIVE | Noted: 2024-08-30

## 2024-08-30 PROBLEM — J44.1 ACUTE EXACERBATION OF CHRONIC OBSTRUCTIVE PULMONARY DISEASE (COPD) (HCC): Status: ACTIVE | Noted: 2020-09-21

## 2024-08-30 PROBLEM — F33.2 MDD (MAJOR DEPRESSIVE DISORDER), RECURRENT EPISODE, SEVERE (HCC): Status: ACTIVE | Noted: 2024-01-05

## 2024-08-30 PROBLEM — E87.6 HYPOKALEMIA: Status: ACTIVE | Noted: 2022-12-10

## 2024-08-30 PROBLEM — S22.9XXA: Status: ACTIVE | Noted: 2024-08-30

## 2024-08-30 PROBLEM — Z72.0 TOBACCO USE: Status: ACTIVE | Noted: 2024-08-30

## 2024-08-30 PROBLEM — Z99.81 OXYGEN DEPENDENT: Status: ACTIVE | Noted: 2024-01-30

## 2024-08-30 PROBLEM — E78.2 MIXED HYPERLIPIDEMIA: Chronic | Status: ACTIVE | Noted: 2020-09-20

## 2024-08-30 PROBLEM — M62.81 GENERALIZED MUSCLE WEAKNESS: Status: ACTIVE | Noted: 2024-01-30

## 2024-08-30 PROBLEM — E78.5 HYPERLIPIDEMIA, UNSPECIFIED: Status: ACTIVE | Noted: 2024-01-30

## 2024-08-30 PROBLEM — Z00.01 ENCOUNTER FOR GENERAL ADULT MEDICAL EXAMINATION WITH ABNORMAL FINDINGS: Status: ACTIVE | Noted: 2024-08-30

## 2024-08-30 PROBLEM — F41.0 PANIC DISORDER: Status: ACTIVE | Noted: 2024-08-30

## 2024-08-30 PROBLEM — Z78.9 UNABLE TO CARE FOR SELF: Status: ACTIVE | Noted: 2024-08-30

## 2024-08-30 PROBLEM — I16.1 HYPERTENSIVE EMERGENCY: Status: ACTIVE | Noted: 2022-06-14

## 2024-08-30 PROBLEM — A41.9 SEPSIS (HCC): Status: ACTIVE | Noted: 2022-12-10

## 2024-08-30 PROBLEM — D50.9 IDA (IRON DEFICIENCY ANEMIA): Status: ACTIVE | Noted: 2024-01-12

## 2024-08-30 PROBLEM — M81.0 OSTEOPOROSIS: Status: ACTIVE | Noted: 2024-08-30

## 2024-08-30 PROBLEM — M71.522: Status: ACTIVE | Noted: 2024-08-30

## 2024-08-30 PROBLEM — R41.82 ALTERED MENTAL STATUS: Status: ACTIVE | Noted: 2022-06-14

## 2024-08-30 PROBLEM — E43 SEVERE MALNUTRITION (HCC): Chronic | Status: ACTIVE | Noted: 2022-06-15

## 2024-08-30 PROBLEM — J96.02 ACUTE RESPIRATORY FAILURE WITH HYPOXIA AND HYPERCAPNIA (HCC): Chronic | Status: ACTIVE | Noted: 2022-06-14

## 2024-08-30 PROBLEM — C85.90 NON-HODGKIN LYMPHOMA, UNSPECIFIED, UNSPECIFIED SITE (HCC): Status: ACTIVE | Noted: 2024-01-30

## 2024-08-30 PROBLEM — J20.9 ACUTE BRONCHITIS: Status: ACTIVE | Noted: 2024-08-30

## 2024-08-30 PROBLEM — E83.42 HYPOMAGNESEMIA: Status: ACTIVE | Noted: 2022-12-11

## 2024-08-30 PROBLEM — I25.10 ATHEROSCLEROTIC HEART DISEASE OF NATIVE CORONARY ARTERY WITHOUT ANGINA PECTORIS: Status: ACTIVE | Noted: 2024-01-30

## 2024-08-30 PROBLEM — E46 UNSPECIFIED PROTEIN-CALORIE MALNUTRITION (HCC): Status: ACTIVE | Noted: 2024-01-30

## 2024-08-30 PROBLEM — Z71.6 TOBACCO ABUSE COUNSELING: Status: ACTIVE | Noted: 2024-08-30

## 2024-08-30 PROBLEM — E78.00 PURE HYPERCHOLESTEROLEMIA: Status: ACTIVE | Noted: 2024-08-30

## 2024-08-30 NOTE — ED PROVIDER NOTES
History  Chief Complaint   Patient presents with    Chest Pain     Pt arrives EMS from home for weakness and L sided chest pain since yesterday with SOB     78-year-old female history of COPD, hypertension, CAD, heart failure on aspirin presenting with chest pain shortness of breath.  Patient reports insidious onset of left-sided chest pain beginning yesterday as well as some shortness of breath.  Patient reports that she is on her baseline 3 L oxygen.  Denies any cough.  Denies any abdominal pain nausea vomiting diarrhea.  Denies any current chest pain.  Received 325 aspirin with EMS.  Denies any other complaints.  Chart reviewed.    Past Medical History:  08/27/2021: Acute congestive heart failure (HCC)  No date: Anxiety  No date: Cardiac disease  08/27/2021: Chest pain  No date: Chronic pain disorder  02/21/2024: CKD (chronic kidney disease)  No date: COPD (chronic obstructive pulmonary disease) ([facility])  02/15/2024: COVID-19  No date: Depression  No date: Heart disease  No date: Hyperlipidemia  No date: Hypertension  No date: MI (myocardial infarction) ([facility])  No date: MRSA (methicillin resistant Staphylococcus aureus)  No date: Renal disorder      Comment:  benign kidney tumor  Family History: non-contributory  Social History          Prior to Admission Medications   Prescriptions Last Dose Informant Patient Reported? Taking?   Calcium 500 MG tablet   No No   Sig: Take 1 tablet (500 mg total) by mouth 2 (two) times a day for 15 days Do not start before April 29, 2024.   Zanubrutinib 80 MG CAPS   Yes No   Sig: Take 160 mg by mouth 2 (two) times a day   Patient not taking: Reported on 6/14/2024   aspirin (ECOTRIN LOW STRENGTH) 81 mg EC tablet   No No   Sig: Take 1 tablet (81 mg total) by mouth daily for 15 days Do not start before April 29, 2024.   buPROPion (WELLBUTRIN) 75 mg tablet   No No   Sig: Take 1 tablet (75 mg total) by mouth in the morning for 15 days Do not start before April 29, 2024.   cariprazine  (Vraylar) 1.5 MG capsule   No No   Sig: Take 1 capsule (1.5 mg total) by mouth daily for 15 days Do not start before April 29, 2024.   gabapentin (NEURONTIN) 100 mg capsule   No No   Sig: Take 1 capsule (100 mg total) by mouth daily at bedtime for 15 days Do not start before April 29, 2024.   ipratropium (ATROVENT) 0.02 % nebulizer solution   No No   Sig: Take 2.5 mL (0.5 mg total) by nebulization 3 (three) times a day for 15 days Do not start before April 29, 2024.   metoprolol succinate (TOPROL-XL) 100 mg 24 hr tablet   No No   Sig: Take 1 tablet (100 mg total) by mouth daily   nitroglycerin (NITROSTAT) 0.4 mg SL tablet   No No   Sig: Place 1 tablet (0.4 mg total) under the tongue every 5 (five) minutes as needed for chest pain for up to 15 days Do not start before April 29, 2024.   pantoprazole (PROTONIX) 40 mg tablet   No No   Sig: Take 1 tablet (40 mg total) by mouth daily for 15 doses Do not start before April 29, 2024.   pravastatin (PRAVACHOL) 80 mg tablet   No No   Sig: Take 1 tablet (80 mg total) by mouth every evening for 15 days Do not start before April 29, 2024.   sacubitril-valsartan (Entresto) 24-26 MG TABS   No No   Sig: Take 1 tablet by mouth 2 (two) times a day for 30 doses Do not start before April 29, 2024.   umeclidinium-vilanterol (Anoro Ellipta) 62.5-25 mcg/actuation inhaler   No No   Sig: Inhale 1 puff daily   vilazodone (VIIBRYD) 40 mg tablet   No No   Sig: Take 1 tablet (40 mg total) by mouth daily with breakfast for 15 days Do not start before April 29, 2024.      Facility-Administered Medications: None       Past Medical History:   Diagnosis Date    Acute congestive heart failure (HCC) 08/27/2021    Anxiety     Cardiac disease     Chest pain 08/27/2021    Chronic pain disorder     CKD (chronic kidney disease) 02/21/2024    COPD (chronic obstructive pulmonary disease) (HCC)     COVID-19 02/15/2024    Depression     Heart disease     Hyperlipidemia     Hypertension     MI (myocardial  infarction) (HCC)     MRSA (methicillin resistant Staphylococcus aureus)     Renal disorder     benign kidney tumor       Past Surgical History:   Procedure Laterality Date    APPENDECTOMY      CARDIAC CATHETERIZATION N/A 2024    Procedure: Cardiac catheterization;  Surgeon: Luke Malagon MD;  Location: [facility];  Service: Cardiology    CARDIAC CATHETERIZATION N/A 2024    Procedure: Cardiac Coronary Angiogram;  Surgeon: Luke Malagon MD;  Location: [facility];  Service: Cardiology    CARDIAC CATHETERIZATION Left 2024    Procedure: Cardiac Left Heart Cath;  Surgeon: Luke Malagon MD;  Location: [facility];  Service: Cardiology    ELBOW BURSA SURGERY Left 2018    D/T MRSA INFECTION    IR THORACENTESIS  2024    SPINAL FUSION      L7 L9       Family History   Problem Relation Age of Onset    Heart disease Mother     Cancer Father      I have reviewed and agree with the history as documented.    E-Cigarette/Vaping    E-Cigarette Use Never User      E-Cigarette/Vaping Substances    Nicotine No     THC No     CBD No     Flavoring No     Other No     Unknown No      Social History     Tobacco Use    Smoking status: Former     Current packs/day: 0.00     Average packs/day: 1 pack/day for 60.0 years (60.0 ttl pk-yrs)     Types: Cigarettes     Start date:      Quit date: 2016     Years since quittin.6    Smokeless tobacco: Never    Tobacco comments:     She has close to a 60 pack-year smoking history, most recently has cut down to 4-5 cigarettes daily   Vaping Use    Vaping status: Never Used   Substance Use Topics    Alcohol use: Never    Drug use: No       Review of Systems   Constitutional:  Negative for appetite change, chills, diaphoresis, fever and unexpected weight change.   HENT:  Negative for congestion and rhinorrhea.    Eyes:  Negative for photophobia and visual disturbance.   Respiratory:  Positive for shortness of breath. Negative for cough  and chest tightness.    Cardiovascular:  Positive for chest pain. Negative for palpitations and leg swelling.   Gastrointestinal:  Negative for abdominal distention, abdominal pain, blood in stool, constipation, diarrhea, nausea and vomiting.   Genitourinary:  Negative for dysuria and hematuria.   Musculoskeletal:  Negative for back pain, joint swelling, neck pain and neck stiffness.   Skin:  Negative for color change, pallor, rash and wound.   Neurological:  Negative for dizziness, syncope, weakness, light-headedness and headaches.   Psychiatric/Behavioral:  Negative for agitation.    All other systems reviewed and are negative.      Physical Exam  Physical Exam  Vitals and nursing note reviewed.   Constitutional:       General: She is not in acute distress.     Appearance: Normal appearance. She is ill-appearing. She is not toxic-appearing or diaphoretic.   HENT:      Head: Normocephalic and atraumatic.      Nose: Nose normal. No congestion or rhinorrhea.      Mouth/Throat:      Mouth: Mucous membranes are moist.      Pharynx: Oropharynx is clear. No oropharyngeal exudate or posterior oropharyngeal erythema.   Eyes:      General: No scleral icterus.        Right eye: No discharge.         Left eye: No discharge.      Extraocular Movements: Extraocular movements intact.      Conjunctiva/sclera: Conjunctivae normal.      Pupils: Pupils are equal, round, and reactive to light.   Neck:      Vascular: No JVD.      Trachea: No tracheal deviation.      Comments: Supple. Normal range of motion.   Cardiovascular:      Rate and Rhythm: Normal rate and regular rhythm.      Heart sounds: Normal heart sounds. No murmur heard.     No friction rub. No gallop.      Comments: Normal rate and regular rhythm  Pulmonary:      Effort: Pulmonary effort is normal. No respiratory distress.      Breath sounds: No stridor. Examination of the right-lower field reveals rales. Examination of the left-lower field reveals rales. Rales present.  No wheezing.      Comments: Mild Rales  Chest:      Chest wall: No tenderness.   Abdominal:      General: Bowel sounds are normal. There is no distension.      Palpations: Abdomen is soft.      Tenderness: There is no abdominal tenderness. There is no right CVA tenderness, left CVA tenderness, guarding or rebound.      Comments: Soft, nontender, nondistended.  Normal bowel sounds throughout   Musculoskeletal:         General: No swelling, tenderness, deformity or signs of injury. Normal range of motion.      Cervical back: Normal range of motion and neck supple. No rigidity. No muscular tenderness.      Right lower leg: No edema.      Left lower leg: No edema.   Lymphadenopathy:      Cervical: No cervical adenopathy.   Skin:     General: Skin is warm and dry.      Coloration: Skin is not pale.      Findings: No erythema or rash.   Neurological:      General: No focal deficit present.      Mental Status: She is alert. Mental status is at baseline.      Sensory: No sensory deficit.      Motor: No weakness or abnormal muscle tone.      Coordination: Coordination normal.      Gait: Gait normal.      Comments: Alert.  Strength and sensation grossly intact.  Ambulatory without difficulty at baseline.    Psychiatric:         Behavior: Behavior normal.         Thought Content: Thought content normal.         Vital Signs  ED Triage Vitals   Temperature Pulse Respirations Blood Pressure SpO2   08/30/24 1824 08/30/24 1730 08/30/24 1730 08/30/24 1730 08/30/24 1730   97.7 °F (36.5 °C) 78 (!) 24 149/71 98 %      Temp src Heart Rate Source Patient Position - Orthostatic VS BP Location FiO2 (%)   -- 08/30/24 1730 08/30/24 1830 08/30/24 1730 --    Monitor Sitting Right arm       Pain Score       08/31/24 0027       No Pain           Vitals:    08/31/24 0306 08/31/24 0802 08/31/24 1534 08/31/24 2226   BP: (!) 154/101 132/86 128/87 155/94   Pulse: 89 103  (!) 112   Patient Position - Orthostatic VS:             Visual Acuity  Visual  Acuity      Flowsheet Row Most Recent Value   L Pupil Size (mm) 3   R Pupil Size (mm) 3   L Pupil Shape Round   R Pupil Shape Round            ED Medications  Medications   aspirin (ECOTRIN LOW STRENGTH) EC tablet 81 mg (81 mg Oral Given 8/31/24 0823)   buPROPion (WELLBUTRIN) tablet 75 mg (75 mg Oral Given 8/31/24 0824)   vilazodone (VIIBRYD) tablet 40 mg (40 mg Oral Given 8/31/24 0822)   umeclidinium-vilanterol 62.5-25 mcg/actuation inhaler 1 puff (1 puff Inhalation Not Given 8/31/24 0823)   sacubitril-valsartan (ENTRESTO) 24-26 MG per tablet 1 tablet (1 tablet Oral Given 8/31/24 1706)   pravastatin (PRAVACHOL) tablet 80 mg (80 mg Oral Not Given 8/31/24 1706)   pantoprazole (PROTONIX) EC tablet 40 mg (40 mg Oral Given 8/31/24 0823)   metoprolol succinate (TOPROL-XL) 24 hr tablet 100 mg (100 mg Oral Given 8/31/24 0823)   gabapentin (NEURONTIN) capsule 100 mg (100 mg Oral Given 8/31/24 2228)   acetaminophen (TYLENOL) tablet 650 mg (has no administration in time range)   polyethylene glycol (MIRALAX) packet 17 g (has no administration in time range)   ondansetron (ZOFRAN) injection 4 mg (has no administration in time range)   aluminum-magnesium hydroxide-simethicone (MAALOX) oral suspension 30 mL (has no administration in time range)   heparin (porcine) subcutaneous injection 5,000 Units (5,000 Units Subcutaneous Given 9/1/24 0606)   levalbuterol (XOPENEX) inhalation solution 1.25 mg (1.25 mg Nebulization Given 8/31/24 1923)   hydrALAZINE (APRESOLINE) injection 10 mg (10 mg Intravenous Not Given 8/31/24 0304)   melatonin tablet 3 mg (3 mg Oral Given 8/31/24 2228)   hydrOXYzine HCL (ATARAX) tablet 25 mg (25 mg Oral Given 8/31/24 2228)       Diagnostic Studies  Results Reviewed       Procedure Component Value Units Date/Time    Comprehensive metabolic panel [[id number]]  (Abnormal) Collected: 08/31/24 8029    Lab Status: Final result Specimen: Blood from Hand, Left Updated: 08/31/24 0533     Sodium 142 mmol/L       Potassium 4.4 mmol/L      Chloride 100 mmol/L      CO2 36 mmol/L      ANION GAP 6 mmol/L      BUN 23 mg/dL      Creatinine 1.26 mg/dL      Glucose 81 mg/dL      Glucose, Fasting 81 mg/dL      Calcium 9.9 mg/dL      AST 13 U/L      ALT 3 U/L      Alkaline Phosphatase 87 U/L      Total Protein 8.2 g/dL      Albumin 4.0 g/dL      Total Bilirubin 0.42 mg/dL      eGFR 41 ml/min/1.73sq m     Narrative:      National Kid[employer] Disease Foundation guidelines for Chronic Kidney Disease (CKD):     Stage 1 with normal or high GFR (GFR > 90 mL/min/1.73 square meters)    Stage 2 Mild CKD (GFR = 60-89 mL/min/1.73 square meters)    Stage 3A Moderate CKD (GFR = 45-59 mL/min/1.73 square meters)    Stage 3B Moderate CKD (GFR = 30-44 mL/min/1.73 square meters)    Stage 4 Severe CKD (GFR = 15-29 mL/min/1.73 square meters)    Stage 5 End Stage CKD (GFR <15 mL/min/1.73 square meters)  Note: GFR calculation is accurate only with a steady state creatinine    Magnesium [[id number]]  (Normal) Collected: 08/31/24 0442    Lab Status: Final result Specimen: Blood from Hand, Left Updated: 08/31/24 0532     Magnesium 2.0 mg/dL     Phosphorus [[id number]]  (Normal) Collected: 08/31/24 0442    Lab Status: Final result Specimen: Blood from Hand, Left Updated: 08/31/24 0532     Phosphorus 2.9 mg/dL     CBC and differential [[id number]]  (Abnormal) Collected: 08/31/24 0442    Lab Status: Final result Specimen: Blood from Hand, Left Updated: 08/31/24 0511     WBC 5.54 Thousand/uL      RBC 4.01 Million/uL      Hemoglobin 11.8 g/dL      Hematocrit 38.2 %      MCV 95 fL      MCH 29.4 pg      MCHC 30.9 g/dL      RDW 13.2 %      MPV 9.6 fL      Platelets 183 Thousands/uL      nRBC 0 /100 WBCs      Segmented % 39 %      Immature Grans % 0 %      Lymphocytes % 47 %      Monocytes % 10 %      Eosinophils Relative 3 %      Basophils Relative 1 %      Absolute Neutrophils 2.15 Thousands/µL      Absolute Immature Grans 0.01 Thousand/uL      Absolute Lymphocytes 2.62  Thousands/µL      Absolute Monocytes 0.55 Thousand/µL      Eosinophils Absolute 0.18 Thousand/µL      Basophils Absolute 0.03 Thousands/µL     HS Troponin I 2hr [[id number]]  (Normal) Collected: 08/30/24 1939    Lab Status: Final result Specimen: Blood from Arm, Left Updated: 08/30/24 2008     hs TnI 2hr 5 ng/L      Delta 2hr hsTnI -1 ng/L     HS Troponin 0hr (reflex protocol) [[id number]]  (Normal) Collected: 08/30/24 1739    Lab Status: Final result Specimen: Blood from Arm, Left Updated: 08/30/24 1811     hs TnI 0hr 6 ng/L     B-Type Natriuretic Peptide(BNP) [[id number]]  (Normal) Collected: 08/30/24 1739    Lab Status: Final result Specimen: Blood from Arm, Left Updated: 08/30/24 1810     BNP 65 pg/mL     Comprehensive metabolic panel [[id number]]  (Abnormal) Collected: 08/30/24 1739    Lab Status: Final result Specimen: Blood from Arm, Left Updated: 08/30/24 1803     Sodium 141 mmol/L      Potassium 4.5 mmol/L      Chloride 101 mmol/L      CO2 35 mmol/L      ANION GAP 5 mmol/L      BUN 24 mg/dL      Creatinine 1.36 mg/dL      Glucose 92 mg/dL      Calcium 9.6 mg/dL      AST 13 U/L      ALT 3 U/L      Alkaline Phosphatase 87 U/L      Total Protein 8.0 g/dL      Albumin 3.9 g/dL      Total Bilirubin 0.33 mg/dL      eGFR 37 ml/min/1.73sq m     Narrative:      National Kidney Disease Foundation guidelines for Chronic Kidney Disease (CKD):     Stage 1 with normal or high GFR (GFR > 90 mL/min/1.73 square meters)    Stage 2 Mild CKD (GFR = 60-89 mL/min/1.73 square meters)    Stage 3A Moderate CKD (GFR = 45-59 mL/min/1.73 square meters)    Stage 3B Moderate CKD (GFR = 30-44 mL/min/1.73 square meters)    Stage 4 Severe CKD (GFR = 15-29 mL/min/1.73 square meters)    Stage 5 End Stage CKD (GFR <15 mL/min/1.73 square meters)  Note: GFR calculation is accurate only with a steady state creatinine    CBC and differential [[id number]]  (Abnormal) Collected: 08/30/24 1739    Lab Status: Final result Specimen: Blood from Arm, Left  Updated: 08/30/24 1745     WBC 5.06 Thousand/uL      RBC 3.75 Million/uL      Hemoglobin 11.4 g/dL      Hematocrit 36.5 %      MCV 97 fL      MCH 30.4 pg      MCHC 31.2 g/dL      RDW 13.2 %      MPV 9.4 fL      Platelets 181 Thousands/uL      nRBC 0 /100 WBCs      Segmented % 40 %      Immature Grans % 0 %      Lymphocytes % 43 %      Monocytes % 11 %      Eosinophils Relative 5 %      Basophils Relative 1 %      Absolute Neutrophils 2.02 Thousands/µL      Absolute Immature Grans 0.00 Thousand/uL      Absolute Lymphocytes 2.23 Thousands/µL      Absolute Monocytes 0.53 Thousand/µL      Eosinophils Absolute 0.24 Thousand/µL      Basophils Absolute 0.04 Thousands/µL                    XR chest 1 view portable   Final Result by Carmine Kwong MD (08/31 7186)      No acute cardiopulmonary disease.            Workstation performed: [id number]                    Procedures  Procedures         ED Course               HEART Risk Score      Flowsheet Row Most Recent Value   Heart Score Risk Calculator    History 0 Filed at: 08/30/2024 2037   ECG 0 Filed at: 08/30/2024 2037   Age 2 Filed at: 08/30/2024 2037   Risk Factors 2 Filed at: 08/30/2024 2037   Troponin 0 Filed at: 08/30/2024 2037   HEART Score 4 Filed at: 08/30/2024 2037                                        Medical Decision Making  78-year-old female history of COPD, hypertension, CAD, heart failure on aspirin presenting with chest pain shortness of breath.  Plan for cardiac evaluation including EKG troponin plus chest x-ray and BNP.  Basic labs.  Reassess.    EKG interpreted by me with normal sinus rhythm and no acute ST abnormality.  Labs interpreted by me notable for troponin of 6 with normal delta.  Chest x-ray interpreted by me without significant acute process.  Patient with difficulty caring for self at home.  Interested in rehab.  Plan for further evaluation and management inpatient.  Admitted.    Amount and/or Complexity of Data Reviewed  Labs:  ordered.  Radiology: ordered.    Risk  Decision regarding hospitalization.                 Disposition  Final diagnoses:   Chest pain   SOB (shortness of breath)   Ambulatory dysfunction     Time reflects when diagnosis was documented in both MDM as applicable and the Disposition within this note       Time User Action Codes Description Comment    8/30/2024  6:05 PM ErnFaraz bingham Add [R07.9] Chest pain     8/30/2024  6:05 PM ErneFaraz Add [R06.02] SOB (shortness of breath)     8/30/2024  8:37 PM ErneRein Add [R26.2] Ambulatory dysfunction     8/30/2024  8:37 PM Erne Faraz Modify [R07.9] Chest pain     8/30/2024  8:37 PM Erne Faraz Modify [R26.2] Ambulatory dysfunction     8/30/2024 10:29 PM Connie Murdock Add [Z60.9] Social problem     8/30/2024 10:29 PM Connie Murdock Add [M62.81] Generalized muscle weakness           ED Disposition       ED Disposition   Admit    Condition   Stable    Date/Time   Fri Aug 30, 2024 2209    Comment   Case was discussed with Dr. Coombs and the patient's admission status was agreed to be Admission Status: observation status to the service of Dr. Coombs .               Follow-up Information    None         Current Discharge Medication List        CONTINUE these medications which have NOT CHANGED    Details   aspirin (ECOTRIN LOW STRENGTH) 81 mg EC tablet Take 1 tablet (81 mg total) by mouth daily for 15 days Do not start before April 29, 2024.  Qty: 15 tablet, Refills: 0    Associated Diagnoses: COPD (chronic obstructive pulmonary disease) (HCC)      buPROPion (WELLBUTRIN) 75 mg tablet Take 1 tablet (75 mg total) by mouth in the morning for 15 days Do not start before April 29, 2024.  Qty: 15 tablet, Refills: 0    Associated Diagnoses: Major depressive disorder, recurrent episode, moderate (HCC)      Calcium 500 MG tablet Take 1 tablet (500 mg total) by mouth 2 (two) times a day for 15 days Do not start before April 29, 2024.  Qty: 30 tablet, Refills: 0    Associated Diagnoses:  Moderate protein-calorie malnutrition (HCC)      cariprazine (Vraylar) 1.5 MG capsule Take 1 capsule (1.5 mg total) by mouth daily for 15 days Do not start before April 29, 2024.  Qty: 15 capsule, Refills: 0    Associated Diagnoses: Major depressive disorder, recurrent episode, moderate ([facility])      gabapentin (NEURONTIN) 100 mg capsule Take 1 capsule (100 mg total) by mouth daily at bedtime for 15 days Do not start before April 29, 2024.  Qty: 15 capsule, Refills: 0    Associated Diagnoses: Pain      ipratropium (ATROVENT) 0.02 % nebulizer solution Take 2.5 mL (0.5 mg total) by nebulization 3 (three) times a day for 15 days Do not start before April 29, 2024.  Qty: 112.5 mL, Refills: 0    Associated Diagnoses: COPD with acute exacerbation ([facility])      metoprolol succinate (TOPROL-XL) 100 mg 24 hr tablet Take 1 tablet (100 mg total) by mouth daily    Associated Diagnoses: Non-ST elevation MI (NSTEMI) ([facility])      nitroglycerin (NITROSTAT) 0.4 mg SL tablet Place 1 tablet (0.4 mg total) under the tongue every 5 (five) minutes as needed for chest pain for up to 15 days Do not start before April 29, 2024.  Qty: 15 tablet, Refills: 0    Associated Diagnoses: Coronary artery disease of native artery of native heart with stable angina pectoris ([facility])      pantoprazole (PROTONIX) 40 mg tablet Take 1 tablet (40 mg total) by mouth daily for 15 doses Do not start before April 29, 2024.  Qty: 15 tablet, Refills: 0    Associated Diagnoses: Anemia, unspecified type      pravastatin (PRAVACHOL) 80 mg tablet Take 1 tablet (80 mg total) by mouth every evening for 15 days Do not start before April 29, 2024.  Qty: 15 tablet, Refills: 0    Associated Diagnoses: Elevated troponin      sacubitril-valsartan (Entresto) 24-26 MG TABS Take 1 tablet by mouth 2 (two) times a day for 30 doses Do not start before April 29, 2024.  Qty: 30 tablet, Refills: 0    Associated Diagnoses: Chronic combined systolic and diastolic CHF (congestive heart failure)  (HCC)      umeclidinium-vilanterol (Anoro Ellipta) 62.5-25 mcg/actuation inhaler Inhale 1 puff daily    Associated Diagnoses: Chronic obstructive pulmonary disease, unspecified COPD type (HCC)      vilazodone (VIIBRYD) 40 mg tablet Take 1 tablet (40 mg total) by mouth daily with breakfast for 15 days Do not start before April 29, 2024.  Qty: 15 tablet, Refills: 0    Associated Diagnoses: Major depressive disorder, recurrent episode, moderate (HCC)      Zanubrutinib 80 MG CAPS Take 160 mg by mouth 2 (two) times a day             No discharge procedures on file.    PDMP Review         Value Time User    PDMP Reviewed  Yes 8/30/2024 10:25 PM TAMMIE Christensen            ED Provider  Electronically Signed by             Faraz Beaver MD  09/01/24 0765

## 2024-08-31 NOTE — PLAN OF CARE
Problem: PHYSICAL THERAPY ADULT  Goal: Performs mobility at highest level of function for planned discharge setting.  See evaluation for individualized goals.  Description: Treatment/Interventions: Functional transfer training, ADL retraining, LE strengthening/ROM, Therapeutic exercise, Endurance training, Cognitive reorientation, Patient/family training, Bed mobility, Gait training, Compensatory technique education, Continued evaluation, Spoke to nursing, Spoke to case management, OT  Equipment Recommended: Walker       See flowsheet documentation for full assessment, interventions and recommendations.  Outcome: Progressing  Note: Prognosis: Good  Problem List: Decreased strength, Decreased endurance, Impaired balance, Decreased mobility, Impaired hearing  Assessment: Pt is 78 y.o. female seen for PT evaluation s/p admit to [facility] on 8/30/2024 w/ Unable to care for self. PT consulted to assess pt's functional mobility and d/c needs. Order placed for PT eval and tx, w/ up as tolerated order. Comorbidities affecting pt's physical performance at time of assessment include: chronic respiratory failure, generalized weakness, COPD, depression, HTN, CKD, CHF. PTA, pt was ambulates household distances, has 4 ADELINA, lives w/ son in single level house, and retired. Personal factors affecting pt at time of IE include: lives in 1 story house, inability to ambulate household distances, unable to perform dynamic tasks in community, positive fall history, hearing impairments, depression, unable to perform physical activity, inability to perform IADLs, and inability to perform ADLs. Please find objective findings from PT assessment regarding body systems outlined above with impairments and limitations including weakness, impaired balance, decreased endurance, gait deviations, decreased activity tolerance, decreased functional mobility tolerance, and fall risk. The following objective measures performed on IE also reveal  limitations: Barthel Index: 40/100, Modified Dennis: 4 (moderate/severe disability), and AM-PAC 6-Clicks: 19/24. Pt's clinical presentation is currently unstable/unpredictable seen in pt's presentation of continued need of medical management and monitoring, decreased strength and balance, leading to an increased risk of falls, decreased endurance and activity tolerance limiting mobility  . Pt to benefit from continued PT tx to address deficits as defined above and maximize level of functional independent mobility and consistency. From PT/mobility standpoint, recommendation at time of d/c would be Level 2 moderate resource utilization post acute rehabilitation services pending progress in order to facilitate return to PLOF.  Barriers to Discharge: Inaccessible home environment     Rehab Resource Intensity Level, PT: II (Moderate Resource Intensity)    See flowsheet documentation for full assessment.

## 2024-08-31 NOTE — H&P
"[facility]  H&P  Name: Lauren Quintero 78 y.o. female I MRN: [id number]  Unit/Bed#: ED 25 I Date of Admission: 8/30/2024   Date of Service: 8/30/2024 I Hospital Day: 0      Assessment & Plan   * Unable to care for self  Assessment & Plan  Presents in the emergency department reporting \" not feeling well.\"  Per ED provider the patient was brought to the ED with some chest pain that subsided prior to ED arrival.    Chest x-ray was obtained final results are pending, per personal reading there is no acute cardiopulmonary findings, blood work shows improved creatinine, troponin are negative, urinalysis was not obtained, EKG shows sinus rhythm heart rate 80 bpm, hypertensive  On assessment Mrs. Quintero explained to me that she has been having some chest discomfort for around 5 days, the pain has now subsided, she also offers feeling unwell and not able to take care of herself.  The patient states she does not want to go home because she is unable to take care for herself and that she has presented to the ER because she would like placement to [facility]  PT/OT for further evaluation  Case management consult in place for possible long-term care placement      Ambulatory dysfunction  Assessment & Plan  Patient states she has been mobilizing around the house with a walker, however reports physical mobility has been declining lately and mostly she is spent her time in her bed  The patient also reports difficulty performing her ADLs  The patient has asked me for help to seek permanent placement at a long-term care facility, case management consult in place  PT and OT consulted for further evaluation      Chronic combined systolic and diastolic CHF (congestive heart failure) (HCC)  Assessment & Plan  Wt Readings from Last 3 Encounters:   08/30/24 55.3 kg (121 lb 14.6 oz)   06/14/24 56.1 kg (123 lb 10.9 oz)   06/11/24 54.9 kg (121 lb)     On assessment the patient is euvolemic, in no acute " exacerbation  Weight shows a loss of 2 pounds  Continue with Entresto daily  Daily weights  Strict intake and output          COPD (chronic obstructive pulmonary disease) ([facility])  Assessment & Plan  On chronic supplemental oxygen at 4 L nasal cannula, the patient is in no acute exacerbation  On Anoro Ellipta and Atrovent daily  Monitor O2 sats per unit protocol    Stage 3b chronic kidney disease ([facility])  Assessment & Plan  Lab Results   Component Value Date    EGFR 37 08/30/2024    EGFR 25 07/19/2024    EGFR 36 06/21/2024    CREATININE 1.36 (H) 08/30/2024    CREATININE 1.88 (H) 07/19/2024    CREATININE 1.38 (H) 06/21/2024   Baseline appears to be 1.5  Avoid nephrotoxic medications, NSAIDs and contrast dye if possible  Monitor kidney function with daily BMPs    Essential hypertension  Assessment & Plan    Blood pressure systolic on epic 140s, at bedside monitor shows systolic 180  On metoprolol and Entresto daily  Labetalol 10 mg every 8 hours as needed for systolic greater than 180  Monitor BP per unit protocol or when needed    Major depressive disorder, recurrent episode, moderate ([facility])  Assessment & Plan  History of depression on Vrylar, Wellbutrin and Vilazdone   Stable       VTE Pharmacologic Prophylaxis: VTE Score: 5 High Risk (Score >/= 5) - Pharmacological DVT Prophylaxis Ordered: heparin. Sequential Compression Devices Ordered.  Code Status: Level 3 - DNAR and DNI Per patient wishes   Discussion with family: Patient declined call to .     Anticipated Length of Stay: Patient will be admitted on an observation basis with an anticipated length of stay of less than 2 midnights secondary to PT/OT/case management consult and possible placement.    Total Time Spent on Date of Encounter in care of patient: 40 mins. This time was spent on one or more of the following: performing physical exam; counseling and coordination of care; obtaining or reviewing history; documenting in the medical record;  reviewing/ordering tests, medications or procedures; communicating with other healthcare professionals and discussing with patient's family/caregivers.    Chief Complaint: Chest pain lasting 5 days and feeling unwell overall    History of Present Illness:  Lauren Quintero is a 78 y.o. female with a PMH of tobacco abuse, chronic respiratory failure, CAD, generalized weakness, dizziness, ambulatory dysfunction, heart failure, CKD, major depressive disorder who presents with chest pain lasting 5 days and feeling unwell overall.  STEVE was contacted by ED provider stating patient presents with chest pain but workup negative, requested admission for ambulatory dysfunction.  At bedside Lauren has explained to me that she has been having some chest discomfort lasting 5 days but it resolved before she arrived to the emergency department.  During this conversation the patient states she would like to find long-term care placement because she is unable to perform her ADLs and her mobility has completely declined and is pretty much bedbound.  Chest x-ray in the ED is still pending however per personal reading there is no cardiopulmonary findings, blood work is unremarkable showing improved and kidney function urinalysis was not obtained, EKG shows sinus rhythm.  The patient reports chest discomfort that has now resolved and unable to care for self, the patient denies any other symptoms.  Medicine was consulted for patient evaluation and possible placement.    Review of Systems:  Review of Systems   Constitutional:  Negative for chills and fever.   HENT:  Negative for ear pain and sore throat.    Eyes:  Negative for pain and visual disturbance.   Respiratory:  Negative for cough and shortness of breath.         Chest discomfort   Cardiovascular:  Negative for chest pain and palpitations.   Gastrointestinal:  Negative for abdominal pain and vomiting.   Genitourinary:  Negative for dysuria and hematuria.   Musculoskeletal:  Negative  for arthralgias and back pain.   Skin:  Negative for color change and rash.   Neurological:  Negative for seizures and syncope.   All other systems reviewed and are negative.      Past Medical and Surgical History:   Past Medical History:   Diagnosis Date    Acute congestive heart failure (HCC) 08/27/2021    Anxiety     Cardiac disease     Chest pain 08/27/2021    Chronic pain disorder     CKD (chronic kidney disease) 02/21/2024    COPD (chronic obstructive pulmonary disease) ([facility])     COVID-19 02/15/2024    Depression     Heart disease     Hyperlipidemia     Hypertension     MI (myocardial infarction) ([facility])     MRSA (methicillin resistant Staphylococcus aureus)     Renal disorder     benign kidney tumor       Past Surgical History:   Procedure Laterality Date    APPENDECTOMY      CARDIAC CATHETERIZATION N/A 2/21/2024    Procedure: Cardiac catheterization;  Surgeon: Luke Malagon MD;  Location: [facility];  Service: Cardiology    CARDIAC CATHETERIZATION N/A 2/21/2024    Procedure: Cardiac Coronary Angiogram;  Surgeon: Luke Malagon MD;  Location: [facility];  Service: Cardiology    CARDIAC CATHETERIZATION Left 2/21/2024    Procedure: Cardiac Left Heart Cath;  Surgeon: Luke Malagon MD;  Location: [facility];  Service: Cardiology    ELBOW BURSA SURGERY Left 02/2018    D/T MRSA INFECTION    IR THORACENTESIS  2/27/2024    SPINAL FUSION      L7 L9       Meds/Allergies:  Prior to Admission medications    Medication Sig Start Date End Date Taking? Authorizing Provider   aspirin (ECOTRIN LOW STRENGTH) 81 mg EC tablet Take 1 tablet (81 mg total) by mouth daily for 15 days Do not start before April 29, 2024. 4/29/24 5/14/24  TAMMIE Thomas   buPROPion (WELLBUTRIN) 75 mg tablet Take 1 tablet (75 mg total) by mouth in the morning for 15 days Do not start before April 29, 2024. 4/29/24 5/14/24  TAMMIE Thomas   Calcium 500 MG tablet Take 1 tablet (500  mg total) by mouth 2 (two) times a day for 15 days Do not start before April 29, 2024. 4/29/24 5/14/24  TAMMIE Thomas   cariprazine (Vraylar) 1.5 MG capsule Take 1 capsule (1.5 mg total) by mouth daily for 15 days Do not start before April 29, 2024. 4/29/24 5/14/24  TAMMIE Thomas   gabapentin (NEURONTIN) 100 mg capsule Take 1 capsule (100 mg total) by mouth daily at bedtime for 15 days Do not start before April 29, 2024. 4/29/24 5/14/24  TAMMIE Thomas   ipratropium (ATROVENT) 0.02 % nebulizer solution Take 2.5 mL (0.5 mg total) by nebulization 3 (three) times a day for 15 days Do not start before April 29, 2024. 4/29/24 5/14/24  TAMMIE Thomas   metoprolol succinate (TOPROL-XL) 100 mg 24 hr tablet Take 1 tablet (100 mg total) by mouth daily 5/15/24   Lexie Richter MD   nitroglycerin (NITROSTAT) 0.4 mg SL tablet Place 1 tablet (0.4 mg total) under the tongue every 5 (five) minutes as needed for chest pain for up to 15 days Do not start before April 29, 2024. 4/29/24 5/14/24  TAMMIE Thomas   pantoprazole (PROTONIX) 40 mg tablet Take 1 tablet (40 mg total) by mouth daily for 15 doses Do not start before April 29, 2024. 4/29/24 5/14/24  TAMMIE Thomas   pravastatin (PRAVACHOL) 80 mg tablet Take 1 tablet (80 mg total) by mouth every evening for 15 days Do not start before April 29, 2024. 4/29/24 5/14/24  TAMMIE Thomas   sacubitril-valsartan (Entresto) 24-26 MG TABS Take 1 tablet by mouth 2 (two) times a day for 30 doses Do not start before April 29, 2024. 4/29/24 5/14/24  TAMMIE Thomas   umeclidinium-vilanterol (Anoro Ellipta) 62.5-25 mcg/actuation inhaler Inhale 1 puff daily 5/28/24 6/27/24  TAMMIE Rojo   vilazodone (VIIBRYD) 40 mg tablet Take 1 tablet (40 mg total) by mouth daily with breakfast for 15 days Do not start before April 29, 2024. 4/29/24 5/14/24   TAMMIE Thomas   Zanubrutinib 80 MG CAPS Take 160 mg by mouth 2 (two) times a day  Patient not taking: Reported on 2024    Historical Provider, MD     I have reviewed home medications with a medical source (PCP, Pharmacy, other).    Allergies:   Allergies   Allergen Reactions    Sulfa Antibiotics Anaphylaxis    Iron GI Intolerance    Niacin     Statins Other (See Comments)     cramps       Social History:  Marital Status:    Occupation: Retired  Patient Pre-hospital Living Situation: Home  Patient Pre-hospital Level of Mobility: non-ambulatory/bed bound  Patient Pre-hospital Diet Restrictions: None  Substance Use History:   Social History     Substance and Sexual Activity   Alcohol Use Never     Social History     Tobacco Use   Smoking Status Former    Current packs/day: 0.00    Average packs/day: 1 pack/day for 60.0 years (60.0 ttl pk-yrs)    Types: Cigarettes    Start date:     Quit date:     Years since quittin.6   Smokeless Tobacco Never   Tobacco Comments    She has close to a 60 pack-year smoking history, most recently has cut down to 4-5 cigarettes daily     Social History     Substance and Sexual Activity   Drug Use No       Family History:  Family History   Problem Relation Age of Onset    Heart disease Mother     Cancer Father        Physical Exam:     Vitals:   Blood Pressure: 153/71 (24)  Pulse: 78 (24)  Temperature: 97.7 °F (36.5 °C) (24)  Respirations: (!) 24 (24)  Weight - Scale: 55.3 kg (121 lb 14.6 oz) (24 193)  SpO2: 100 % (24)    Physical Exam  Vitals and nursing note reviewed.   Constitutional:       General: She is not in acute distress.     Appearance: She is well-developed.   HENT:      Head: Normocephalic and atraumatic.      Mouth/Throat:      Mouth: Mucous membranes are moist.      Pharynx: Oropharynx is clear.   Eyes:      Conjunctiva/sclera: Conjunctivae normal.   Cardiovascular:       Rate and Rhythm: Normal rate and regular rhythm.      Pulses: Normal pulses.      Heart sounds: Normal heart sounds. No murmur heard.  Pulmonary:      Effort: Pulmonary effort is normal. No respiratory distress.      Breath sounds: Normal breath sounds.   Abdominal:      General: Bowel sounds are normal.      Palpations: Abdomen is soft.      Tenderness: There is no abdominal tenderness.   Musculoskeletal:         General: No swelling.      Cervical back: Neck supple.   Skin:     General: Skin is warm and dry.      Capillary Refill: Capillary refill takes less than 2 seconds.   Neurological:      General: No focal deficit present.      Mental Status: She is alert and oriented to person, place, and time. Mental status is at baseline.   Psychiatric:         Attention and Perception: Attention normal.         Mood and Affect: Mood normal.         Speech: Speech normal.         Behavior: Behavior normal. Behavior is cooperative.         Thought Content: Thought content normal.         Cognition and Memory: Cognition normal.         Judgment: Judgment normal.      Comments: Pleasant          Additional Data:     Lab Results:  Results from last 7 days   Lab Units 08/30/24  1739   WBC Thousand/uL 5.06   HEMOGLOBIN g/dL 11.4*   HEMATOCRIT % 36.5   PLATELETS Thousands/uL 181   SEGS PCT % 40*   LYMPHO PCT % 43   MONO PCT % 11   EOS PCT % 5     Results from last 7 days   Lab Units 08/30/24  1739   SODIUM mmol/L 141   POTASSIUM mmol/L 4.5   CHLORIDE mmol/L 101   CO2 mmol/L 35*   BUN mg/dL 24   CREATININE mg/dL 1.36*   ANION GAP mmol/L 5   CALCIUM mg/dL 9.6   ALBUMIN g/dL 3.9   TOTAL BILIRUBIN mg/dL 0.33   ALK PHOS U/L 87   ALT U/L 3*   AST U/L 13   GLUCOSE RANDOM mg/dL 92             Lab Results   Component Value Date    HGBA1C 5.3 02/15/2024    HGBA1C 5.0 06/15/2022           Lines/Drains:  Invasive Devices       Peripheral Intravenous Line  Duration             Peripheral IV 08/30/24 Left Antecubital <1 day                         Imaging: Personally reviewed all imaging pertaining this admission.  XR chest 1 view portable    (Results Pending)       EKG and Other Studies Reviewed on Admission:   EKG: Personally Reviewed.    ** Please Note: This note has been constructed using a voice recognition system. **

## 2024-08-31 NOTE — CASE MANAGEMENT
Case Management Assessment & Discharge Planning Note    Patient name Lauren Quintero  Location /-01 MRN [id number]  : 1945 Date 2024       Current Admission Date: 2024  Current Admission Diagnosis:Unable to care for self   Patient Active Problem List    Diagnosis Date Noted Date Diagnosed    Closed fracture of bones of trunk 2024     Diverticulosis of large intestine without perforation or abscess without bleeding 2024     Encounter for general adult medical examination with abnormal findings 2024     First degree hemorrhoids 2024     Folliculitis ulerythematosa reticulata 2024     History of colonic polyps 2024     Obstructive sleep apnea syndrome 2024     Osteoporosis 2024     Other bursitis, not elsewhere classified, left elbow 2024     Panic disorder 2024     Pure hypercholesterolemia 2024     Thoracic radiculopathy 2024     Tobacco abuse counseling 2024     Vitamin deficiency 2024     Acute bronchitis 2024     Chronic sinusitis 2024     Hypersomnia 2024     Tobacco use 2024     Unable to care for self 2024     Stage 3b chronic kidney disease ([facility]) 2024     Chronic respiratory failure with hypoxia ([facility]) 2024     Acute kidney injury superimposed on chronic kidney disease  ([facility]) 2024     DARLING (acute kidney injury) ([facility]) 2024     Diarrhea 2024     Pain 04/15/2024     Loose stools 2024     Insomnia 2024     Non-ST elevation MI (NSTEMI) ([facility]) 2024     Abnormal chest x-ray 2024     Chronic combined systolic and diastolic CHF (congestive heart failure) ([facility]) 2024     Elevated troponin 2024     Generalized muscle weakness 2024     Major depressive disorder, recurrent, unspecified ([facility]) 2024     Hyperlipidemia, unspecified 2024     Non-Hodgkin lymphoma, unspecified, unspecified site ([facility])  01/30/2024     Oxygen dependent 01/30/2024     Anxiety disorder 01/30/2024     Unspecified abnormalities of gait and mobility 01/30/2024     Acute and chronic respiratory failure, unspecified whether with hypoxia or hypercapnia (HCC) 01/30/2024     Atherosclerotic heart disease of native coronary artery without angina pectoris 01/30/2024     Unspecified protein-calorie malnutrition (HCC) 01/30/2024     JOHN (iron deficiency anemia) 01/12/2024     On home O2 01/06/2024     MDD (major depressive disorder), recurrent episode, severe (HCC) 01/05/2024     Dizziness 12/14/2023     Social problem 12/13/2023     Diverticulitis 10/29/2023     Moderate protein-calorie malnutrition ([facility]) 05/02/2023     BRBPR (bright red blood per rectum) 05/01/2023     Generalized weakness 03/30/2023     Staring episodes 03/30/2023     Waldenstrom macroglobulinemia ([facility]) 03/06/2023     Agitation 12/20/2022     Influenza A 12/19/2022     Hypomagnesemia 12/11/2022     Lymphoma ([facility]) 12/10/2022     Right lower lobe pneumonia 12/10/2022     Hypokalemia 12/10/2022     Sepsis ([facility]) 12/10/2022     Severe malnutrition (HCC) 06/15/2022     Acute respiratory failure with hypoxia and hypercapnia ([facility]) 06/14/2022     Altered mental status 06/14/2022     Hypertensive emergency 06/14/2022     Severe protein-calorie malnutrition (HCC) 08/28/2021     Positive blood culture 08/28/2021     COPD (chronic obstructive pulmonary disease) ([facility]) 08/27/2021     Anemia 08/27/2021     Abnormal computed tomography angiography (CTA) 08/27/2021     Fatigue 03/30/2021     Irritable bowel syndrome 09/21/2020     Acute exacerbation of chronic obstructive pulmonary disease (COPD) ([facility]) 09/21/2020     Depression 09/20/2020     History of coronary artery disease 09/20/2020     Leukocytosis 09/20/2020     Mixed hyperlipidemia 09/20/2020     Major depressive disorder, recurrent episode, moderate (HCC) 12/30/2019     Flank pain 04/12/2019     Coronary artery disease of native artery  of native heart with stable angina pectoris (HCC) 12/21/2018     Acute on chronic respiratory failure with hypoxia (HCC) 12/19/2018     Ambulatory dysfunction 12/16/2018     Essential hypertension 12/16/2018     Tobacco abuse 12/16/2018     Need for prophylactic hormone replacement therapy (postmenopausal) 06/01/2000     Sciatica 06/01/2000     Plantar fibromatosis 01/06/2000       LOS (days): 0  Geometric Mean LOS (GMLOS) (days):   Days to GMLOS:     OBJECTIVE:              Current admission status: Observation       Preferred Pharmacy:   [id number] - [place] - [address]  [address]  [place] [address]  Phone: [phone] Fax: [phone]    [facility] - [place] - [address]  [address]  [address]  [place] [address]  Phone: [phone] Fax: [phone]    [place], [place] - [address] [facility]  [address] [facility] [address]  Phone: [phone] Fax: [phone]    [facility] [place] - [address]  [address]  [place] [address]  Phone: [phone] Fax: [phone]    Primary Care Provider: Starla Bateman MD    Primary Insurance: MEDICARE  Secondary Insurance:     ASSESSMENT:  Active Health Care Proxies       Imer Quintero Health Care Agent - Son   Primary Phone: [phone] (Home)                 Advance Directives  Does patient have a Health Care POA?: Yes  Does patient have Advance Directives?: Yes  Advance Directives: Power of  for health care  Primary Contact: Imer Quintero (Son)  [phone]         Readmission Root Cause  30 Day Readmission: No    Patient Information  Admitted from:: Home  Mental Status: Alert  During Assessment patient was accompanied by: Not accompanied during assessment  Assessment information provided by:: Patient, Son  Primary Caregiver: Self  Support Systems: Son  County of Residence: [place]  What [place] you [place] in[place]  Home entry access options. Select all that apply.: Stairs  Number of steps to enter home.: 4  Do the steps have railings?: Yes  Type of Current Residence: [facility]  Living Arrangements: Lives w/ Son  Is patient a ?: No    Activities of Daily Living Prior to Admission  Functional Status: Assistance  Completes ADLs independently?: No  Level of ADL dependence: Assistance  Ambulates independently?: No  Level of ambulatory dependence: Assistance  Does patient use assisted devices?: Yes  Assisted Devices (DME) used: Walker, Wheelchair, Shower Chair, Home Oxygen concentrator  DME Company Name (respiratory supplies): unkown  O2 Rate(s): 4LPM  Does patient currently own DME?: Yes  What DME does the patient currently own?: Walker, Wheelchair, Shower Chair, Home Oxygen concentrator  Does patient have a history of Outpatient Therapy (PT/OT)?: No  Does the patient have a history of Short-Term Rehab?: Yes ([facility], [facility], [facility])  Does patient have a history of HHC?: Yes ([facility])  Does patient currently have HHC?: No         Patient Information Continued  Income Source: SSI/SSD  Does patient have prescription coverage?: Yes  Does patient receive dialysis treatments?: No  Does patient have a history of substance abuse?: No  Does patient have a history of Mental Health Diagnosis?: Yes (MDD)  Is patient receiving treatment for mental health?: Yes  Has patient received inpatient treatment related to mental health in the last 2 years?: No         Means of Transportation  Means of Transport to [facility]:: Family transport      Social Determinants of Health (SDOH)      Flowsheet Row Most Recent Value   Housing Stability    In the last 12 months, was there a time when you were not able to pay the mortgage or rent on time? N   In the past 12 months, how many times have you moved where you were living? 1  [at rehab in the past year]   At any time in the past 12 months, were you homeless or living in a shelter  (including now)? N   Transportation Needs    In the past 12 months, has lack of transportation kept you from medical appointments or from getting medications? no   In the past 12 months, has lack of transportation kept you from meetings, work, or from getting things needed for daily living? No   Food Insecurity    Within the past 12 months, you worried that your food would run out before you got the money to buy more. Never true   Within the past 12 months, the food you bought just didn't last and you didn't have money to get more. Never true   Utilities    In the past 12 months has the electric, gas, oil, or water company threatened to shut off services in your home? No            DISCHARGE DETAILS:    Discharge planning discussed with:: pt and her son  Freedom of Choice: Yes  Comments - Freedom of Choice: CM spoke with pt who directed CM to speak with her son Imer to discuss discharge plans. Pt expressed wanted to go to rehab and transition to LTC. CM spoke with pt son Imer who is fully aware that CM is able to find placement for STR pending PT/OT evals and if a facility is able to offer LTC they will be discussing LTC placement with family directly. Pt son expressed that he is fully aware as this has been a re-ocurring event with pt. [facility] referral made via [facility]. Message sent to [facility] as it is pt perfered facility. CM dept continues to follow and assist with pt dc plans.  CM contacted family/caregiver?: Yes  Were Treatment Team discharge recommendations reviewed with patient/caregiver?: Yes  Did patient/caregiver verbalize understanding of patient care needs?: Yes  Were patient/caregiver advised of the risks associated with not following Treatment Team discharge recommendations?: Yes    Contacts  Patient Contacts: Imer (son)  Relationship to Patient:: Family  Contact Method: Phone  Phone Number: [phone]  Reason/Outcome: Continuity of Care, Discharge Planning, Emergency Contact,  Referral    Requested Home Health Care         Is the patient interested in HHC at discharge?: No    DME Referral Provided  Referral made for DME?: No    Other Referral/Resources/Interventions Provided:  Interventions: Short Term Rehab         Treatment Team Recommendation: Short Term Rehab  Discharge Destination Plan:: Short Term Rehab  Transport at Discharge : Eder metcalf  Dispatcher Contacted: No

## 2024-08-31 NOTE — ASSESSMENT & PLAN NOTE
Lab Results   Component Value Date    EGFR 41 08/31/2024    EGFR 37 08/30/2024    EGFR 25 07/19/2024    CREATININE 1.26 08/31/2024    CREATININE 1.36 (H) 08/30/2024    CREATININE 1.88 (H) 07/19/2024   Baseline appears to be 1.5  Avoid nephrotoxic medications, NSAIDs and contrast dye if possible  Monitor kidney function with daily BMPs

## 2024-08-31 NOTE — ASSESSMENT & PLAN NOTE
On chronic supplemental oxygen at 4 L nasal cannula, the patient is in no acute exacerbation  On Anoro Ellipta and Atrovent daily  Monitor O2 sats per unit protocol

## 2024-08-31 NOTE — PROGRESS NOTES
"[facility]   Progress Note  Name: Lauren Quintero I  MRN: [id number]  Unit/Bed#: -01 I Date of Admission: 8/30/2024   Date of Service: 8/31/2024 I Hospital Day: 0    * Unable to care for self  Assessment & Plan  Presents in the emergency department reporting \" not feeling well.\"  Per ED provider the patient was brought to the ED with some chest pain that subsided prior to ED arrival.    Chest x-ray was obtained final results are pending, per personal reading there is no acute cardiopulmonary findings, blood work shows improved creatinine, troponin are negative, urinalysis was not obtained, EKG shows sinus rhythm heart rate 80 bpm, hypertensive  On assessment Mrs. Quintero explained to me that she has been having some chest discomfort for around 5 days, the pain has now subsided, she also offers feeling unwell and not able to take care of herself.  The patient states she does not want to go home because she is unable to take care for herself and that she has presented to the ER because she would like placement to [facility]  PT/OT for further evaluation  Case management consult in place for possible long-term care placement      Chronic respiratory failure with hypoxia (HCC)  Assessment & Plan  Currently SpO2: (!) 89 % on Nasal Cannula O2 Flow Rate (L/min): 3 L/min    At baseline, uses 3L    Plan:  Goal > 88%  Adjust O2 as needed      COPD without exacerbation (HCC)  Assessment & Plan  On chronic supplemental oxygen at 4 L nasal cannula, the patient is in no acute exacerbation  On Anoro Ellipta and Atrovent daily  Monitor O2 sats per unit protocol    Major depressive disorder, recurrent episode, moderate (HCC)  Assessment & Plan  History of depression on Vrylar, Wellbutrin and Vilazdone   Denies SI  Stable    Ambulatory dysfunction  Assessment & Plan  Patient states she has been mobilizing around the house with a walker, however reports physical mobility has been declining lately " and mostly she is spent her time in her bed  The patient also reports difficulty performing her ADLs  The patient has asked me for help to seek permanent placement at a long-term care facility, case management consult in place  PT and OT consulted for further evaluation      Essential hypertension  Assessment & Plan  Blood Pressure: 132/86  On metoprolol and Entresto daily  Labetalol 10 mg every 8 hours as needed for systolic greater than 180  Monitor BP per unit protocol or when needed    Stage 3b chronic kidney disease (HCC)  Assessment & Plan  Lab Results   Component Value Date    EGFR 41 08/31/2024    EGFR 37 08/30/2024    EGFR 25 07/19/2024    CREATININE 1.26 08/31/2024    CREATININE 1.36 (H) 08/30/2024    CREATININE 1.88 (H) 07/19/2024   Baseline appears to be 1.5  Avoid nephrotoxic medications, NSAIDs and contrast dye if possible  Monitor kidney function with daily BMPs    Chronic combined systolic and diastolic CHF (congestive heart failure) ([facility])  Assessment & Plan  Wt Readings from Last 3 Encounters:   08/30/24 55.3 kg (121 lb 14.6 oz)   06/14/24 56.1 kg (123 lb 10.9 oz)   06/11/24 54.9 kg (121 lb)     On assessment the patient is euvolemic, in no acute exacerbation  Weight shows a loss of 2 pounds  Continue with Entresto daily  Daily weights  Strict intake and output        VTE Pharmacologic Prophylaxis: VTE Score: 5 High Risk (Score >/= 5) - Pharmacological DVT Prophylaxis Ordered: heparin. Sequential Compression Devices Ordered.    Mobility:   Basic Mobility Inpatient Raw Score: 21  JH-HLM Goal: 6: Walk 10 steps or more  JH-HLM Achieved: 6: Walk 10 steps or more  JH-HLM Goal achieved. Continue to encourage appropriate mobility.    Patient Centered Rounds: I performed bedside rounds with nursing staff today.  Discussions with Specialists or Other Care Team Provider:  IP CONSULT TO CASE MANAGEMENT  IP CONSULT TO CASE MANAGEMENT      Education and Discussions with Family / Patient: patient    Total Time  Spent on Date of Encounter in care of patient: 30+ mins. This time was spent on one or more of the following: performing physical exam; counseling and coordination of care; obtaining or reviewing history; documenting in the medical record; reviewing/ordering tests, medications or procedures; communicating with other healthcare professionals and discussing with patient's family/caregivers.    Current Length of Stay: 0 day(s)  Current Patient Status: Inpatient   Certification Statement: The patient will continue to require additional inpatient hospital stay due to plan as noted above  Discharge Plan: Anticipate discharge in 48-72 hrs to rehab facility.    Code Status: Level 3 - DNAR and DNI    Subjective:   Offers no new complaints at this time. No acute events reported overnight. Understanding of plan.  All questions answered. Eager for discharge to facility but understanding she'd need to stay for a few days for placement.    Objective:     Vitals:   Temp (24hrs), Av °F (36.7 °C), Min:97.7 °F (36.5 °C), Max:98.3 °F (36.8 °C)    Temp:  [97.7 °F (36.5 °C)-98.3 °F (36.8 °C)] 98.3 °F (36.8 °C)  HR:  [] 103  Resp:  [16-24] 16  BP: (132-171)/() 132/86  SpO2:  [89 %-100 %] 89 %  Body mass index is 19.09 kg/m².     Input and Output Summary (last 24 hours):     Intake/Output Summary (Last 24 hours) at 2024 1116  Last data filed at 2024 0802  Gross per 24 hour   Intake 120 ml   Output --   Net 120 ml       Physical Exam:   Physical Exam  Vitals and nursing note reviewed.   Constitutional:       General: She is not in acute distress.     Appearance: She is ill-appearing (Chronically). She is not toxic-appearing.      Comments: Elderly, frail   HENT:      Head: Normocephalic.      Nose: Nose normal.      Mouth/Throat:      Mouth: Mucous membranes are dry.   Eyes:      General: No scleral icterus.     Conjunctiva/sclera: Conjunctivae normal.   Cardiovascular:      Rate and Rhythm: Normal rate.       Pulses: Normal pulses.           Radial pulses are 2+ on the right side and 2+ on the left side.      Heart sounds: Normal heart sounds.   Pulmonary:      Effort: Pulmonary effort is normal.      Breath sounds: Normal breath sounds.   Abdominal:      General: Bowel sounds are normal. There is no distension.      Palpations: Abdomen is soft.      Tenderness: There is no abdominal tenderness.   Musculoskeletal:         General: No tenderness.   Skin:     General: Skin is dry.      Coloration: Skin is not jaundiced.   Neurological:      Mental Status: She is alert.   Psychiatric:         Mood and Affect: Mood normal.         Behavior: Behavior normal. Behavior is cooperative.          Additional Data:     Labs:  Results from last 7 days   Lab Units 08/31/24  0442   WBC Thousand/uL 5.54   HEMOGLOBIN g/dL 11.8   HEMATOCRIT % 38.2   PLATELETS Thousands/uL 183   SEGS PCT % 39*   LYMPHO PCT % 47*   MONO PCT % 10   EOS PCT % 3     Results from last 7 days   Lab Units 08/31/24  0442   SODIUM mmol/L 142   POTASSIUM mmol/L 4.4   CHLORIDE mmol/L 100   CO2 mmol/L 36*   BUN mg/dL 23   CREATININE mg/dL 1.26   ANION GAP mmol/L 6   CALCIUM mg/dL 9.9   ALBUMIN g/dL 4.0   TOTAL BILIRUBIN mg/dL 0.42   ALK PHOS U/L 87   ALT U/L 3*   AST U/L 13   GLUCOSE RANDOM mg/dL 81                       Lines/Drains:  Invasive Devices       Peripheral Intravenous Line  Duration             Peripheral IV 08/30/24 Left Antecubital <1 day                          Imaging: Reviewed radiology reports from this admission including:   XR chest 1 view portable    Result Date: 8/31/2024  Impression: No acute cardiopulmonary disease. Workstation performed: [id number]       XR chest 1 view portable    Result Date: 8/31/2024  Impression No acute cardiopulmonary disease. Workstation performed: [id number]        Results for orders placed during the hospital encounter of 02/15/24    Echo complete w/ contrast if indicated    Interpretation Summary    Left Ventricle:  Left ventricular cavity size is normal. Wall thickness is mildly increased. There is mild asymmetric hypertrophy of the basal septal wall. The left ventricular ejection fraction is 35%. Systolic function is severely reduced. Diastolic function is mildly abnormal, consistent with grade I (abnormal) relaxation.    The following segments are hypokinetic: mid anteroseptal, mid inferoseptal, apical anterior, apical septal, apical inferior and apex.    Mitral Valve: There is severe annular calcification.    Tricuspid Valve: There is mild regurgitation.    IVC/SVC: The inferior vena cava is dilated.    Pulmonary Artery: The estimated pulmonary artery systolic pressure is 40.0 mmHg. The pulmonary artery systolic pressure is mildly increased.      Recent Cultures (last 7 days):         Last 24 Hours Medication List:   Current Facility-Administered Medications   Medication Dose Route Frequency Provider Last Rate    acetaminophen  650 mg Oral Q6H PRN CHANA ChristensenNP      aluminum-magnesium hydroxide-simethicone  30 mL Oral Q6H PRN TAMMIE Christensen      aspirin  81 mg Oral Daily CHANA ChristensenNP      buPROPion  75 mg Oral Daily CHANA ChristensenNP      gabapentin  100 mg Oral HS TAMMIE Christensen      heparin (porcine)  5,000 Units Subcutaneous Q8H [facility] TAMMIE Christensen      hydrALAZINE  10 mg Intravenous Once TAMMIE Christensen      hydrOXYzine HCL  25 mg Oral Q6H PRN TAMMIE Christensen      levalbuterol  1.25 mg Nebulization TID Abhilash Coombs, DO      melatonin  3 mg Oral HS TAMMIE Christensen      metoprolol succinate  100 mg Oral Daily ATMMIE Christensen      ondansetron  4 mg Intravenous Q6H PRN TAMMIE Christensen      pantoprazole  40 mg Oral Daily TAMMIE Christensen      polyethylene glycol  17 g Oral Daily PRN TAMMIE Christensen      pravastatin  80 mg Oral QPM TAMMIE Christensen      sacubitril-valsartan  1 tablet Oral BID TAMMIE Christensen      umeclidinium-vilanterol  1 puff Inhalation Daily Connie  TAMMIE Murdock      vilazodone  40 mg Oral Daily With Breakfast TAMMIE Christensen          Today, Patient Was Seen By: Jose Shelton DO    **Please Note: This note may have been constructed using a voice recognition system.**

## 2024-08-31 NOTE — ASSESSMENT & PLAN NOTE
Wt Readings from Last 3 Encounters:   08/30/24 55.3 kg (121 lb 14.6 oz)   06/14/24 56.1 kg (123 lb 10.9 oz)   06/11/24 54.9 kg (121 lb)     On assessment the patient is euvolemic, in no acute exacerbation  Weight shows a loss of 2 pounds  Continue with Entresto daily  Daily weights  Strict intake and output

## 2024-08-31 NOTE — ASSESSMENT & PLAN NOTE
"Presents in the emergency department reporting \" not feeling well.\"  Per ED provider the patient was brought to the ED with some chest pain that subsided prior to ED arrival.    Chest x-ray was obtained final results are pending, per personal reading there is no acute cardiopulmonary findings, blood work shows improved creatinine, troponin are negative, urinalysis was not obtained, EKG shows sinus rhythm heart rate 80 bpm, hypertensive  On assessment Mrs. Quintero explained to me that she has been having some chest discomfort for around 5 days, the pain has now subsided, she also offers feeling unwell and not able to take care of herself.  The patient states she does not want to go home because she is unable to take care for herself and that she has presented to the ER because she would like placement to [facility]  PT/OT for further evaluation  Case management consult in place for possible long-term care placement    "

## 2024-08-31 NOTE — ASSESSMENT & PLAN NOTE
Patient states she has been mobilizing around the house with a walker, however reports physical mobility has been declining lately and mostly she is spent her time in her bed  The patient also reports difficulty performing her ADLs  The patient has asked me for help to seek permanent placement at a long-term care facility, case management consult in place  PT and OT consulted for further evaluation

## 2024-08-31 NOTE — PHYSICAL THERAPY NOTE
Physical Therapy Evaluation     Patient's Name: Lauren Quintero    Admitting Diagnosis  Chest pain [R07.9]  SOB (shortness of breath) [R06.02]  Generalized muscle weakness [M62.81]  Social problem [Z60.9]  Ambulatory dysfunction [R26.2]    Problem List  Patient Active Problem List   Diagnosis    Ambulatory dysfunction    Essential hypertension    Tobacco abuse    Acute on chronic respiratory failure with hypoxia (HCC)    Coronary artery disease of native artery of native heart with stable angina pectoris (HCC)    Flank pain    Major depressive disorder, recurrent episode, moderate (HCC)    Fatigue    COPD without exacerbation (HCC)    Anemia    Abnormal computed tomography angiography (CTA)    Severe protein-calorie malnutrition (HCC)    Positive blood culture    Waldenstrom macroglobulinemia (HCC)    Generalized weakness    Staring episodes    BRBPR (bright red blood per rectum)    Moderate protein-calorie malnutrition (HCC)    Diverticulitis    Social problem    Dizziness    Elevated troponin    Chronic combined systolic and diastolic CHF (congestive heart failure) (HCC)    Abnormal chest x-ray    Loose stools    Insomnia    Non-ST elevation MI (NSTEMI) (HCC)    Pain    Diarrhea    DARLING (acute kidney injury) (HCC)    Acute kidney injury superimposed on chronic kidney disease  (HCC)    Chronic respiratory failure with hypoxia (HCC)    Stage 3b chronic kidney disease (HCC)    Acute respiratory failure with hypoxia and hypercapnia (HCC)    Agitation    Altered mental status    Closed fracture of bones of trunk    Depression    Diverticulosis of large intestine without perforation or abscess without bleeding    Encounter for general adult medical examination with abnormal findings    First degree hemorrhoids    Folliculitis ulerythematosa reticulata    Generalized muscle weakness    History of colonic polyps    History of coronary artery disease    Hypertensive emergency    Hypomagnesemia    JOHN (iron deficiency  anemia)    Influenza A    Irritable bowel syndrome    Leukocytosis    Major depressive disorder, recurrent, unspecified (HCC)    MDD (major depressive disorder), recurrent episode, severe (HCC)    Hyperlipidemia, unspecified    Mixed hyperlipidemia    Need for prophylactic hormone replacement therapy (postmenopausal)    Lymphoma (HCC)    Non-Hodgkin lymphoma, unspecified, unspecified site (HCC)    Obstructive sleep apnea syndrome    Osteoporosis    Other bursitis, not elsewhere classified, left elbow    Oxygen dependent    On home O2    Anxiety disorder    Panic disorder    Plantar fibromatosis    Pure hypercholesterolemia    Right lower lobe pneumonia    Sciatica    Thoracic radiculopathy    Tobacco abuse counseling    Unspecified abnormalities of gait and mobility    Vitamin deficiency    Acute and chronic respiratory failure, unspecified whether with hypoxia or hypercapnia (HCC)    Acute bronchitis    Acute exacerbation of chronic obstructive pulmonary disease (COPD) (HCC)    Chronic sinusitis    Atherosclerotic heart disease of native coronary artery without angina pectoris    Hypersomnia    Hypokalemia    Severe malnutrition (HCC)    Unspecified protein-calorie malnutrition (HCC)    Sepsis (HCC)    Tobacco use    Unable to care for self       Past Medical History  Past Medical History:   Diagnosis Date    Acute congestive heart failure ([facility]) 08/27/2021    Anxiety     Cardiac disease     Chest pain 08/27/2021    Chronic pain disorder     CKD (chronic kidney disease) 02/21/2024    COPD (chronic obstructive pulmonary disease) ([facility])     COVID-19 02/15/2024    Depression     Heart disease     Hyperlipidemia     Hypertension     MI (myocardial infarction) ([facility])     MRSA (methicillin resistant Staphylococcus aureus)     Renal disorder     benign kidney tumor       Past Surgical History  Past Surgical History:   Procedure Laterality Date    APPENDECTOMY      CARDIAC CATHETERIZATION N/A 2/21/2024    Procedure: Cardiac  catheterization;  Surgeon: Luke Malagon MD;  Location: [facility];  Service: Cardiology    CARDIAC CATHETERIZATION N/A 2/21/2024    Procedure: Cardiac Coronary Angiogram;  Surgeon: Luke Malagon MD;  Location: [facility];  Service: Cardiology    CARDIAC CATHETERIZATION Left 2/21/2024    Procedure: Cardiac Left Heart Cath;  Surgeon: Luke Malagon MD;  Location: [facility];  Service: Cardiology    ELBOW BURSA SURGERY Left 02/2018    D/T MRSA INFECTION    IR THORACENTESIS  2/27/2024    SPINAL FUSION      L7 L9          08/31/24 0901   Pain Assessment   Pain Assessment Tool 0-10   Pain Score No Pain   AM-PAC Basic Mobility Inpatient   Turning in Flat Bed Without Bedrails 4   Lying on Back to Sitting on Edge of Flat Bed Without Bedrails 4   Moving Bed to Chair 4   Standing Up From Chair Using Arms 4   Walk in Room 3   Climb 3-5 Stairs With Railing 2   Basic Mobility Inpatient Raw Score 21   Basic Mobility Standardized Score 45.55   [facility] Highest Level Of Mobility   -HLM Goal 6: Walk 10 steps or more   -HLM Achieved 6: Walk 10 steps or more   Exercises   Hip Flexion Sitting;20 reps;AROM;Bilateral   Hip Abduction Sitting;20 reps;AROM;Bilateral   Hip Adduction Sitting;20 reps;AROM;Bilateral   Knee AROM Long Arc Quad Sitting;20 reps;AROM;Bilateral   Ankle Pumps Sitting;20 reps;AROM;Bilateral           Gilberto Raccuia, PT

## 2024-08-31 NOTE — PLAN OF CARE
Problem: Prexisting or High Potential for Compromised Skin Integrity  Goal: Skin integrity is maintained or improved  Description: INTERVENTIONS:  - Identify patients at risk for skin breakdown  - Assess and monitor skin integrity  - Assess and monitor nutrition and hydration status  - Monitor labs   - Assess for incontinence   - Turn and reposition patient  - Assist with mobility/ambulation  - Relieve pressure over bony prominences  - Avoid friction and shearing  - Provide appropriate hygiene as needed including keeping skin clean and dry  - Evaluate need for skin moisturizer/barrier cream  - Collaborate with interdisciplinary team   - Patient/family teaching  - Consider wound care consult   Outcome: Progressing     Problem: PAIN - ADULT  Goal: Verbalizes/displays adequate comfort level or baseline comfort level  Description: Interventions:  - Encourage patient to monitor pain and request assistance  - Assess pain using appropriate pain scale  - Administer analgesics based on type and severity of pain and evaluate response  - Implement non-pharmacological measures as appropriate and evaluate response  - Consider cultural and social influences on pain and pain management  - Notify physician/advanced practitioner if interventions unsuccessful or patient reports new pain  Outcome: Progressing     Problem: INFECTION - ADULT  Goal: Absence or prevention of progression during hospitalization  Description: INTERVENTIONS:  - Assess and monitor for signs and symptoms of infection  - Monitor lab/diagnostic results  - Monitor all insertion sites, i.e. indwelling lines, tubes, and drains  - Monitor endotracheal if appropriate and nasal secretions for changes in amount and color  - [facility] appropriate cooling/warming therapies per order  - Administer medications as ordered  - Instruct and encourage patient and family to use good hand hygiene technique  - Identify and instruct in appropriate isolation precautions for  identified infection/condition  Outcome: Progressing  Goal: Absence of fever/infection during neutropenic period  Description: INTERVENTIONS:  - Monitor WBC    Outcome: Progressing     Problem: SAFETY ADULT  Goal: Patient will remain free of falls  Description: INTERVENTIONS:  - Educate patient/family on patient safety including physical limitations  - Instruct patient to call for assistance with activity   - Consult OT/PT to assist with strengthening/mobility   - Keep Call bell within reach  - Keep bed low and locked with side rails adjusted as appropriate  - Keep care items and personal belongings within reach  - Initiate and maintain comfort rounds  - Make Fall Risk Sign visible to staff  - Offer Toileting every 2 Hours, in advance of need  - Initiate/Maintain bed alarm  - Obtain necessary fall risk management equipment: chair alarm  - Apply yellow socks and bracelet for high fall risk patients  - Consider moving patient to room near nurses station  Outcome: Progressing  Goal: Maintain or return to baseline ADL function  Description: INTERVENTIONS:  -  Assess patient's ability to carry out ADLs; assess patient's baseline for ADL function and identify physical deficits which impact ability to perform ADLs (bathing, care of mouth/teeth, toileting, grooming, dressing, etc.)  - Assess/evaluate cause of self-care deficits   - Assess range of motion  - Assess patient's mobility; develop plan if impaired  - Assess patient's need for assistive devices and provide as appropriate  - Encourage maximum independence but intervene and supervise when necessary  - Involve family in performance of ADLs  - Assess for home care needs following discharge   - Consider OT consult to assist with ADL evaluation and planning for discharge  - Provide patient education as appropriate  Outcome: Progressing  Goal: Maintains/Returns to pre admission functional level  Description: INTERVENTIONS:  - Perform AM-PAC 6 Click Basic Mobility/ Daily  Activity assessment daily.  - Set and communicate daily mobility goal to care team and patient/family/caregiver.   - Collaborate with rehabilitation services on mobility goals if consulted  - Perform Range of Motion 3 times a day.  - Reposition patient every 3 hours.  - Dangle patient 3 times a day  - Stand patient 3 times a day  - Ambulate patient 3 times a day  - Out of bed to chair 3 times a day   - Out of bed for meals 3 times a day  - Out of bed for toileting  - Record patient progress and toleration of activity level   Outcome: Progressing     Problem: DISCHARGE PLANNING  Goal: Discharge to home or other facility with appropriate resources  Description: INTERVENTIONS:  - Identify barriers to discharge w/patient and caregiver  - Arrange for needed discharge resources and transportation as appropriate  - Identify discharge learning needs (meds, wound care, etc.)  - Arrange for interpretive services to assist at discharge as needed  - Refer to Case Management Department for coordinating discharge planning if the patient needs post-hospital services based on physician/advanced practitioner order or complex needs related to functional status, cognitive ability, or social support system  Outcome: Progressing     Problem: Knowledge Deficit  Goal: Patient/family/caregiver demonstrates understanding of disease process, treatment plan, medications, and discharge instructions  Description: Complete learning assessment and assess knowledge base.  Interventions:  - Provide teaching at level of understanding  - Provide teaching via preferred learning methods  Outcome: Progressing     Problem: CARDIOVASCULAR - ADULT  Goal: Maintains optimal cardiac output and hemodynamic stability  Description: INTERVENTIONS:  - Monitor I/O, vital signs and rhythm  - Monitor for S/S and trends of decreased cardiac output  - Administer and titrate ordered vasoactive medications to optimize hemodynamic stability  - Assess quality of pulses,  skin color and temperature  - Assess for signs of decreased coronary artery perfusion  - Instruct patient to report change in severity of symptoms  Outcome: Progressing  Goal: Absence of cardiac dysrhythmias or at baseline rhythm  Description: INTERVENTIONS:  - Continuous cardiac monitoring, vital signs, obtain 12 lead EKG if ordered  - Administer antiarrhythmic and heart rate control medications as ordered  - Monitor electrolytes and administer replacement therapy as ordered  Outcome: Progressing     Problem: RESPIRATORY - ADULT  Goal: Achieves optimal ventilation and oxygenation  Description: INTERVENTIONS:  - Assess for changes in respiratory status  - Assess for changes in mentation and behavior  - Position to facilitate oxygenation and minimize respiratory effort  - Oxygen administered by appropriate delivery if ordered  - Initiate smoking cessation education as indicated  - Encourage broncho-pulmonary hygiene including cough, deep breathe, Incentive Spirometry  - Assess the need for suctioning and aspirate as needed  - Assess and instruct to report SOB or any respiratory difficulty  - Respiratory Therapy support as indicated  Outcome: Progressing     Problem: METABOLIC, FLUID AND ELECTROLYTES - ADULT  Goal: Fluid balance maintained  Description: INTERVENTIONS:  - Monitor labs   - Monitor I/O and WT  - Instruct patient on fluid and nutrition as appropriate  - Assess for signs & symptoms of volume excess or deficit  Outcome: Progressing

## 2024-08-31 NOTE — RESPIRATORY THERAPY NOTE
RT Protocol Note  Lauren Quintero 78 y.o. female MRN: [id number]  Unit/Bed#: -01 Encounter: [id number]    Assessment    Principal Problem:    Unable to care for self  Active Problems:    Ambulatory dysfunction    Essential hypertension    Major depressive disorder, recurrent episode, moderate ([facility])    COPD (chronic obstructive pulmonary disease) ([facility])    Chronic combined systolic and diastolic CHF (congestive heart failure) ([facility])    Stage 3b chronic kidney disease ([facility])      Home Pulmonary Medications:  Atrovent TID  Home Devices/Therapy: Home O2    Past Medical History:   Diagnosis Date    Acute congestive heart failure ([facility]) 2021    Anxiety     Cardiac disease     Chest pain 2021    Chronic pain disorder     CKD (chronic kidney disease) 2024    COPD (chronic obstructive pulmonary disease) ([facility])     COVID-19 02/15/2024    Depression     Heart disease     Hyperlipidemia     Hypertension     MI (myocardial infarction) ([facility])     MRSA (methicillin resistant Staphylococcus aureus)     Renal disorder     benign kidney tumor     Social History     Socioeconomic History    Marital status:      Spouse name: Not on file    Number of children: 3    Years of education: 13    Highest education level: High school graduate   Occupational History    Occupation: [employer]     Employer: [employer]     Comment: retired    Tobacco Use    Smoking status: Former     Current packs/day: 0.00     Average packs/day: 1 pack/day for 60.0 years (60.0 ttl pk-yrs)     Types: Cigarettes     Start date:      Quit date: 2016     Years since quittin.6    Smokeless tobacco: Never    Tobacco comments:     She has close to a 60 pack-year smoking history, most recently has cut down to 4-5 cigarettes daily   Vaping Use    Vaping status: Never Used   Substance and Sexual Activity    Alcohol use: Never    Drug use: No    Sexual activity: Not Currently   Other Topics Concern    Not on file   Social  History Narrative    Not on file     Social Determinants of Health     Financial Resource Strain: Patient Declined (1/6/2024)    Received from [facility]    Overall Financial Resource Strain (CARDIA)     Difficulty of Paying Living Expenses: Patient declined   Food Insecurity: No Food Insecurity (8/31/2024)    Hunger Vital Sign     Worried About Running Out of Food in the Last Year: Never true     Ran Out of Food in the Last Year: Never true   Transportation Needs: No Transportation Needs (8/31/2024)    PRAPARE - Transportation     Lack of Transportation (Medical): No     Lack of Transportation (Non-Medical): No   Physical Activity: Patient Declined (1/6/2024)    Received from [facility]    Exercise Vital Sign     Days of Exercise per Week: Patient declined     Minutes of Exercise per Session: Patient declined   Stress: Patient Declined (1/6/2024)    Received from [facility] - Occupational Stress Questionnaire     Feeling of Stress : Patient declined   Social Connections: Unknown (6/18/2024)    Received from [facility]    Social Connections     How often do you feel lonely or isolated from those around you? (Adult - for ages 18 years and over): Not on file   Intimate Partner Violence: Patient Declined (1/6/2024)    Received from [facility]    Humiliation, Afraid, Rape, and Kick questionnaire     Fear of Current or Ex-Partner: Patient declined     Emotionally Abused: Patient declined     Physically Abused: Patient declined     Sexually Abused: Patient declined   Housing Stability: Low Risk  (8/31/2024)    Housing Stability Vital Sign     Unable to Pay for Housing in the Last Year: No     Number of Times Moved in the Last Year: 1     Homeless in the Last Year: No       Subjective         Objective    Physical Exam:   Assessment Type: Assess only  General Appearance:  Awake  Respiratory Pattern: Normal  Chest Assessment: Chest expansion symmetrical  Bilateral Breath Sounds: Diminished    Vitals:  Blood pressure (!) 171/92, pulse 74, temperature 98 °F (36.7 °C), resp. rate (!) 24, weight 55.3 kg (121 lb 14.6 oz), SpO2 98%, not currently breastfeeding.          Imaging and other studies:           Plan    Respiratory Plan: Home Bronchodilator Patient pathway        Resp Comments: Pt with presumed history of COPD admitted because she is unable to care for herself and awaiting placement. Will continue with anoro and TID txs. Pt does take txs at home.

## 2024-08-31 NOTE — PLAN OF CARE
Problem: OCCUPATIONAL THERAPY ADULT  Goal: Performs self-care activities at highest level of function for planned discharge setting.  See evaluation for individualized goals.  Description: Treatment Interventions: ADL retraining, Functional transfer training, UE strengthening/ROM, Endurance training, Compensatory technique education          See flowsheet documentation for full assessment, interventions and recommendations.   Note: Limitation: Decreased ADL status, Decreased Safe judgement during ADL, Decreased endurance, Decreased self-care trans, Decreased high-level ADLs  Prognosis: Good  Assessment: Patient is a 78 y.o. female seen for OT evaluation s/p admit to [facility] on 8/30/2024 w/chest pain, feeling well and Unable to care for self. Commorbidities affecting patient's functional performance at time of assessment include:  has a past medical history of Acute congestive heart failure ([facility]), Anxiety, Cardiac disease, Chest pain, Chronic pain disorder, CKD (chronic kidney disease), COPD (chronic obstructive pulmonary disease) ([facility]), COVID-19, Depression, Heart disease, Hyperlipidemia, Hypertension, MI (myocardial infarction) ([facility]), MRSA (methicillin resistant Staphylococcus aureus), and Renal disorder.  Orders placed for OT evaluation and treatment.  Performed at least two patient identifiers during session including name and wristband.  Prior to admission, patient was independent with ADL tasks, used a rollator for mobility and had assistance with IADL tasks . Personal factors affecting patient at time of initial evaluation include: steps to enter, difficulty performing ADLs, and difficulty performing IADLs. Upon evaluation, patient requires supervision assist for UB ADLs, minimal  assist for LB ADLs, transfers and functional ambulation in room and bathroom with minimal  assist, with the use of Rolling Walker.  Patient is oriented x 4. Occupational performance is affected by the following  deficits: decreased muscle strength, dynamic sit/ stand balance deficit with poor standing tolerance time for self care and functional mobility, and decreased activity tolerance.  Patient to benefit from continued Occupational Therapy treatment while in the hospital to address deficits as defined above and maximize level of functional independence with ADLs and functional mobility. Occupational Performance areas to address include: grooming , bathing/ shower, dressing, toilet hygiene, transfer to all surfaces, functional mobility, and community mobility. From OT standpoint, recommendation at time of d/c would be level II (moderate resource intensity).     Rehab Resource Intensity Level, OT: II (Moderate Resource Intensity)

## 2024-08-31 NOTE — ASSESSMENT & PLAN NOTE
Lab Results   Component Value Date    EGFR 37 08/30/2024    EGFR 25 07/19/2024    EGFR 36 06/21/2024    CREATININE 1.36 (H) 08/30/2024    CREATININE 1.88 (H) 07/19/2024    CREATININE 1.38 (H) 06/21/2024   Baseline appears to be 1.5  Avoid nephrotoxic medications, NSAIDs and contrast dye if possible  Monitor kidney function with daily BMPs

## 2024-08-31 NOTE — OCCUPATIONAL THERAPY NOTE
Occupational Therapy Evaluation     Patient Name: Lauren Quintero  Today's Date: 8/31/2024  Problem List  Principal Problem:    Unable to care for self  Active Problems:    Ambulatory dysfunction    Essential hypertension    Major depressive disorder, recurrent episode, moderate (HCC)    COPD without exacerbation (HCC)    Chronic combined systolic and diastolic CHF (congestive heart failure) (HCC)    Chronic respiratory failure with hypoxia (HCC)    Stage 3b chronic kidney disease (HCC)    Past Medical History  Past Medical History:   Diagnosis Date    Acute congestive heart failure (HCC) 08/27/2021    Anxiety     Cardiac disease     Chest pain 08/27/2021    Chronic pain disorder     CKD (chronic kidney disease) 02/21/2024    COPD (chronic obstructive pulmonary disease) ([facility])     COVID-19 02/15/2024    Depression     Heart disease     Hyperlipidemia     Hypertension     MI (myocardial infarction) ([facility])     MRSA (methicillin resistant Staphylococcus aureus)     Renal disorder     benign kidney tumor     Past Surgical History  Past Surgical History:   Procedure Laterality Date    APPENDECTOMY      CARDIAC CATHETERIZATION N/A 2/21/2024    Procedure: Cardiac catheterization;  Surgeon: Luke Malagon MD;  [facility]: [facility];  Service: Cardiology    CARDIAC CATHETERIZATION N/A 2/21/2024    Procedure: Cardiac Coronary Angiogram;  Surgeon: Luke Malagon MD;  [facility]: [facility];  Service: Cardiology    CARDIAC CATHETERIZATION Left 2/21/2024    Procedure: Cardiac Left Heart Cath;  Surgeon: Luke Malagon MD;  [facility]: [facility];  Service: Cardiology    ELBOW BURSA SURGERY Left 02/2018    D/T MRSA INFECTION    IR THORACENTESIS  2/27/2024    SPINAL FUSION      L7 L9             08/31/24 0830   OT Last Visit   OT Visit Date 08/31/24   Note Type   Note type Evaluation   Pain Assessment   Pain Assessment Tool 0-10   Pain Score No Pain   Restrictions/Precautions   Weight Bearing  Precautions Per Order No   Braces or Orthoses Other (Comment)  (None)   Home Living   Type of Home House   Home Layout One level;Stairs to enter with rails;Performs ADLs on one level  (4 steps to enter)   Bathroom Shower/Tub Tub/shower unit   Bathroom Toilet Standard   Bathroom Equipment Grab bars in shower;Grab bars around toilet;Shower chair;Commode   Bathroom Accessibility Accessible   Home Equipment Other (Comment)  (Rollator)   Prior Function   Level of [place] Independent with ADLs;Independent with functional mobility  (Son helps with cooking and cleaning, mobility with rollator)   Lives With Son   Receives Help From Family   IADLs Family/Friend/Other provides transportation;Family/Friend/Other provides meals;Family/Friend/Other provides medication management   Falls in the last 6 months 1 to 4  (1 fall PTA)   Vocational Retired   Lifestyle   Autonomy Patient was independent with ADL tasks, received assistance for IADL tasks and driving.   Reciprocal Relationships Family   Service to Others Retired   General   Family/Caregiver Present No   Subjective   Subjective I haven't slept in 4 days.   ADL   Where Assessed Other (Comment)  (ADL levels based on functional performance during OT eval)   Eating Assistance 5  Supervision/Setup   Eating Deficit Setup   Grooming Assistance 5  Supervision/Setup   Grooming Deficit Setup  (standing at sink)   UB Bathing Assistance 5  Supervision/Setup   UB Bathing Deficit Setup;Supervision/safety;Increased time to complete   LB Bathing Assistance 4  Minimal Assistance   LB Bathing Deficit Setup;Increased time to complete   UB Dressing Assistance 5  Supervision/Setup   UB Dressing Deficit Setup;Increased time to complete   LB Dressing Assistance 4  Minimal Assistance   LB Dressing Deficit Increased time to complete;Supervision/safety   Toileting Assistance  4  Minimal Assistance   Toileting Deficit Increased time to complete;Grab bar use   Bed Mobility   Supine to Sit 5   Supervision   Additional items Assist x 1;HOB elevated;Bedrails   Additional Comments Pt received in bed and left in chair at end of session   Transfers   Sit to Stand 4  Minimal assistance  (CGA)   Additional items Assist x 1;Armrests   Stand to Sit 5  Supervision   Additional items Assist x 1;Armrests   Toilet transfer 4  Minimal assistance   Additional items Assist x 1   Functional Mobility   Functional Mobility 4  Minimal assistance   Additional items Rolling walker  (min A x 1 for mobility within room to bathroom and back to chair.)   Balance   Static Sitting Good   Dynamic Sitting Fair +   Static Standing Fair   Dynamic Standing Fair -   Ambulatory Poor +   Activity Tolerance   Activity Tolerance Patient limited by fatigue   Medical Staff Made Aware Pt seen as co-evaluation with PT Al due to patient's co-morbidities, clinically unstable presentation, and present impairments which are a regression from the patient's baseline.   Nurse Made Aware RN Ed aware   RUE Assessment   RUE Assessment X  (UE strength grossly 3+/5 during functional activities)   LUE Assessment   LUE Assessment X  (UE strength grossly 3+/5 during functional activities)   Hand Function   Gross Motor Coordination Functional   Fine Motor Coordination Functional   Sensation   Light Touch No apparent deficits   Sharp/Dull No apparent deficits   Stereognosis No apparent deficits   Proprioception   Proprioception No apparent deficits   Vision-Basic Assessment   Current Vision Wears glasses for distance only   Psychosocial   Psychosocial (WDL) WDL   Perception   Inattention/Neglect Appears intact   Motor Planning Appears intact   Perseveration Not present   Cognition   Overall Cognitive Status WFL   Arousal/Participation Alert;Responsive;Cooperative   Attention Within functional limits   Orientation Level Oriented X4   Memory Within functional limits   Following Commands Follows all commands and directions without difficulty   Assessment    Limitation Decreased ADL status;Decreased Safe judgement during ADL;Decreased endurance;Decreased self-care trans;Decreased high-level ADLs   Prognosis Good   Assessment Patient is a 78 y.o. female seen for OT evaluation s/p admit to [facility] on 8/30/2024 w/chest pain, feeling well and Unable to care for self. Commorbidities affecting patient's functional performance at time of assessment include:  has a past medical history of Acute congestive heart failure ([facility]), Anxiety, Cardiac disease, Chest pain, Chronic pain disorder, CKD (chronic kidney disease), COPD (chronic obstructive pulmonary disease) ([facility]), COVID-19, Depression, Heart disease, Hyperlipidemia, Hypertension, MI (myocardial infarction) ([facility]), MRSA (methicillin resistant Staphylococcus aureus), and Renal disorder.  Orders placed for OT evaluation and treatment.  Performed at least two patient identifiers during session including name and wristband.  Prior to admission, patient was independent with ADL tasks, used a rollator for mobility and had assistance with IADL tasks . Personal factors affecting patient at time of initial evaluation include: steps to enter, difficulty performing ADLs, and difficulty performing IADLs. Upon evaluation, patient requires supervision assist for UB ADLs, minimal  assist for LB ADLs, transfers and functional ambulation in room and bathroom with minimal  assist, with the use of Rolling Walker.  Patient is oriented x 4. Occupational performance is affected by the following deficits: decreased muscle strength, dynamic sit/ stand balance deficit with poor standing tolerance time for self care and functional mobility, and decreased activity tolerance.  Patient to benefit from continued Occupational Therapy treatment while in the hospital to address deficits as defined above and maximize level of functional independence with ADLs and functional mobility. Occupational Performance areas to address include: grooming  , bathing/ shower, dressing, toilet hygiene, transfer to all surfaces, functional mobility, and community mobility. From OT standpoint, recommendation at time of d/c would be level II (moderate resource intensity).   Plan   Treatment Interventions ADL retraining;Functional transfer training;UE strengthening/ROM;Endurance training;Compensatory technique education   Goal Expiration Date 09/14/24   OT Treatment Day 0   OT Frequency 3-5x/wk   Discharge Recommendation   Rehab Resource Intensity Level, OT II (Moderate Resource Intensity)   AM-PAC Daily Activity Inpatient   Lower Body Dressing 3   Bathing 3   Toileting 3   Upper Body Dressing 3   Grooming 3   Eating 4   Daily Activity Raw Score 19   Daily Activity Standardized Score (Calc for Raw Score >=11) 40.22   AM-PAC Applied Cognition Inpatient   Following a Speech/Presentation 4   Understanding Ordinary Conversation 4   Taking Medications 4   Remembering Where Things Are Placed or Put Away 4   Remembering List of 4-5 Errands 4   Taking Care of Complicated Tasks 4   Applied Cognition Raw Score 24   Applied Cognition Standardized Score 62.21   Barthel Index   Feeding 10   Bathing 0   Grooming Score 0   Dressing Score 5   Bladder Score 5   Bowels Score 5   Toilet Use Score 5   Transfers (Bed/Chair) Score 10   Mobility (Level Surface) Score 0   Stairs Score 5   Barthel Index Score 45     Occupational Therapy Goals: In 7- 10 days:  1.  Patient will complete UB dressing tasks while seated MI.   2. Patient will complete LB ADLs at supervision  level,  with the use of AE as indicated.  3. Patient will increase OOB/ sitting tolerance to 2-4 hours per day for increased participation in self care and leisure tasks with no s/s of exertion  4. Patient will increase standing tolerance time to  10  minutes with  Unilateral UE support to complete sink level ADLs @ MI level.   5.  Patient/ Family  will demonstrate competency with UE Home Exercise Program.

## 2024-08-31 NOTE — PHYSICAL THERAPY NOTE
Physical Therapy Evaluation     Patient's Name: Lauren Quintero    Admitting Diagnosis  Chest pain [R07.9]  SOB (shortness of breath) [R06.02]  Generalized muscle weakness [M62.81]  Social problem [Z60.9]  Ambulatory dysfunction [R26.2]    Problem List  Patient Active Problem List   Diagnosis    Ambulatory dysfunction    Essential hypertension    Tobacco abuse    Acute on chronic respiratory failure with hypoxia (HCC)    Coronary artery disease of native artery of native heart with stable angina pectoris (HCC)    Flank pain    Major depressive disorder, recurrent episode, moderate (HCC)    Fatigue    COPD without exacerbation (HCC)    Anemia    Abnormal computed tomography angiography (CTA)    Severe protein-calorie malnutrition (HCC)    Positive blood culture    Waldenstrom macroglobulinemia (HCC)    Generalized weakness    Staring episodes    BRBPR (bright red blood per rectum)    Moderate protein-calorie malnutrition (HCC)    Diverticulitis    Social problem    Dizziness    Elevated troponin    Chronic combined systolic and diastolic CHF (congestive heart failure) (HCC)    Abnormal chest x-ray    Loose stools    Insomnia    Non-ST elevation MI (NSTEMI) (HCC)    Pain    Diarrhea    DARLING (acute kidney injury) (HCC)    Acute kidney injury superimposed on chronic kidney disease  (HCC)    Chronic respiratory failure with hypoxia (HCC)    Stage 3b chronic kidney disease (HCC)    Acute respiratory failure with hypoxia and hypercapnia (HCC)    Agitation    Altered mental status    Closed fracture of bones of trunk    Depression    Diverticulosis of large intestine without perforation or abscess without bleeding    Encounter for general adult medical examination with abnormal findings    First degree hemorrhoids    Folliculitis ulerythematosa reticulata    Generalized muscle weakness    History of colonic polyps    History of coronary artery disease    Hypertensive emergency    Hypomagnesemia    JOHN (iron deficiency  anemia)    Influenza A    Irritable bowel syndrome    Leukocytosis    Major depressive disorder, recurrent, unspecified (HCC)    MDD (major depressive disorder), recurrent episode, severe (HCC)    Hyperlipidemia, unspecified    Mixed hyperlipidemia    Need for prophylactic hormone replacement therapy (postmenopausal)    Lymphoma (HCC)    Non-Hodgkin lymphoma, unspecified, unspecified site (HCC)    Obstructive sleep apnea syndrome    Osteoporosis    Other bursitis, not elsewhere classified, left elbow    Oxygen dependent    On home O2    Anxiety disorder    Panic disorder    Plantar fibromatosis    Pure hypercholesterolemia    Right lower lobe pneumonia    Sciatica    Thoracic radiculopathy    Tobacco abuse counseling    Unspecified abnormalities of gait and mobility    Vitamin deficiency    Acute and chronic respiratory failure, unspecified whether with hypoxia or hypercapnia (HCC)    Acute bronchitis    Acute exacerbation of chronic obstructive pulmonary disease (COPD) (HCC)    Chronic sinusitis    Atherosclerotic heart disease of native coronary artery without angina pectoris    Hypersomnia    Hypokalemia    Severe malnutrition (HCC)    Unspecified protein-calorie malnutrition (HCC)    Sepsis (HCC)    Tobacco use    Unable to care for self       Past Medical History  Past Medical History:   Diagnosis Date    Acute congestive heart failure ([facility]) 08/27/2021    Anxiety     Cardiac disease     Chest pain 08/27/2021    Chronic pain disorder     CKD (chronic kidney disease) 02/21/2024    COPD (chronic obstructive pulmonary disease) ([facility])     COVID-19 02/15/2024    Depression     Heart disease     Hyperlipidemia     Hypertension     MI (myocardial infarction) ([facility])     MRSA (methicillin resistant Staphylococcus aureus)     Renal disorder     benign kidney tumor       Past Surgical History  Past Surgical History:   Procedure Laterality Date    APPENDECTOMY      CARDIAC CATHETERIZATION N/A 2/21/2024    Procedure: Cardiac  catheterization;  Surgeon: Luke Malagon MD;  [facility]: [facility];  Service: Cardiology    CARDIAC CATHETERIZATION N/A 2/21/2024    Procedure: Cardiac Coronary Angiogram;  Surgeon: Luke Malagon MD;  [facility];  Service: Cardiology    CARDIAC CATHETERIZATION Left 2/21/2024    Procedure: Cardiac Left Heart Cath;  Surgeon: Luke Malagon MD;  [facility]: [facility];  Service: Cardiology    ELBOW BURSA SURGERY Left 02/2018    D/T MRSA INFECTION    IR THORACENTESIS  2/27/2024    SPINAL FUSION      L7 L9          08/31/24 0905   PT Last Visit   PT Visit Date 08/31/24   Note Type   Note type Evaluation   Pain Assessment   Pain Assessment Tool 0-10   Pain Score No Pain   Restrictions/Precautions   Weight Bearing Precautions Per Order No   Braces or Orthoses Other (Comment)  (None)   Other Precautions Chair Alarm;Bed Alarm;Multiple lines;Fall Risk;O2  (4L O2 at baseline)   Home Living   Type of Home House   Home Layout One level;Stairs to enter with rails;Performs ADLs on one level  (4 ADELINA)   Bathroom Shower/Tub Tub/shower unit   Bathroom Toilet Standard   Bathroom Equipment Grab bars in shower;Grab bars around toilet;Commode;Shower chair   Bathroom Accessibility Accessible;Other (Comment)  (Rollator)   Home Equipment Other (Comment)  (Rollator)   Prior Function   Level of [place] Needs assistance with functional mobility;Independent with ADLs  (Son helps with cooking and cleaning)   Lives With Son   Receives Help From Family   IADLs Family/Friend/Other provides transportation;Family/Friend/Other provides meals;Family/Friend/Other provides medication management   Falls in the last 6 months 1 to 4  (1 fall PTA)   Vocational Retired   General   Family/Caregiver Present No   Cognition   Overall Cognitive Status WFL   Arousal/Participation Alert   Orientation Level Oriented X4   Memory Within functional limits   Following Commands Follows all commands and directions  without difficulty   RLE Assessment   RLE Assessment X   Strength RLE   RLE Overall Strength 3+/5   LLE Assessment   LLE Assessment X   Strength LLE   LLE Overall Strength 3+/5   Vision-Basic Assessment   Current Vision Wears glasses for distance only   Proprioception   RLE Proprioception Grossly intact   LLE Proprioception Grossly Intact   Bed Mobility   Supine to Sit 5  Supervision   Additional items Assist x 1;HOB elevated;Increased time required;Verbal cues   Additional Comments Pt received in bed and left in chair at end of session   Transfers   Sit to Stand 4  Minimal assistance   Additional items Assist x 1;Increased time required;Verbal cues   Stand to Sit 5  Supervision   Additional items Assist x 1;Armrests;Increased time required;Verbal cues   Toilet transfer 4  Minimal assistance   Additional items Assist x 1;Increased time required;Verbal cues  (VC to use hand rails.)   Ambulation/Elevation   Gait pattern Decreased foot clearance;Inconsistent tutu;Redundant gait at times;Short stride;Decreased hip extension;Decreased heel strike;Decreased toe off;Step through pattern;Shuffling   Gait Assistance 4  Minimal assist   Additional items Assist x 1;Verbal cues   Assistive Device Rolling walker   Distance 10 ft x 2   Stair Management Assistance Not tested   Balance   Static Sitting Good   Dynamic Sitting Fair +   Static Standing Fair   Dynamic Standing Fair -   Ambulatory Poor +   Activity Tolerance   Activity Tolerance Patient limited by fatigue   Nurse Made Aware Pt seen as co-evaluation with ALEJO Pardo due to patient's co-morbidities, clinically unstable presentation, and present impairments which are a regression from the patient's baseline.   Assessment   Prognosis Good   Problem List Decreased strength;Decreased endurance;Impaired balance;Decreased mobility;Impaired hearing   Assessment Pt is 78 y.o. female seen for PT evaluation s/p admit to [facility] on 8/30/2024 w/ Unable to care for self. PT  consulted to assess pt's functional mobility and d/c needs. Order placed for PT eval and tx, w/ up as tolerated order. Comorbidities affecting pt's physical performance at time of assessment include: chronic respiratory failure, generalized weakness, COPD, depression, HTN, CKD, CHF. PTA, pt was ambulates household distances, has 4 ADELINA, lives w/ son in single level house, and retired. Personal factors affecting pt at time of IE include: lives in 1 story house, inability to ambulate household distances, unable to perform dynamic tasks in community, positive fall history, hearing impairments, depression, unable to perform physical activity, inability to perform IADLs, and inability to perform ADLs. Please find objective findings from PT assessment regarding body systems outlined above with impairments and limitations including weakness, impaired balance, decreased endurance, gait deviations, decreased activity tolerance, decreased functional mobility tolerance, and fall risk. The following objective measures performed on IE also reveal limitations: Barthel Index: 40/100, Modified [place]: 4 (moderate/severe disability), and AM-PAC 6-Clicks: 19/24. Pt's clinical presentation is currently unstable/unpredictable seen in pt's presentation of continued need of medical management and monitoring, decreased strength and balance, leading to an increased risk of falls, decreased endurance and activity tolerance limiting mobility  . Pt to benefit from continued PT tx to address deficits as defined above and maximize level of functional independent mobility and consistency. From PT/mobility standpoint, recommendation at time of d/c would be Level 2 moderate resource utilization post acute rehabilitation services pending progress in order to facilitate return to PLOF.   Barriers to Discharge Inaccessible home environment   Goals   STG Expiration Date 09/10/24   Short Term Goal #1 In 10 days: Increase bilateral LE strength 1 grade to  facilitate independent mobility, Perform all bed mobility tasks with distant S to decrease caregiver burden, Perform all transfers with distant S to improve independence, Ambulate > 100 ft. with RW with distant S w/o LOB and w/ normalized gait pattern 100% of the time, and Increase all balance 1 grade to decrease risk for falls   Plan   Treatment/Interventions Functional transfer training;ADL retraining;LE strengthening/ROM;Therapeutic exercise;Endurance training;Cognitive reorientation;Patient/family training;Bed mobility;Gait training;Compensatory technique education;Continued evaluation;Spoke to nursing;Spoke to case management;OT   PT Frequency 3-5x/wk   Discharge Recommendation   Rehab Resource Intensity Level, PT II (Moderate Resource Intensity)   Equipment Recommended Walker   Walker Package Recommended Wheeled walker   AM-PAC Basic Mobility Inpatient   Turning in Flat Bed Without Bedrails 4   Lying on Back to Sitting on Edge of Flat Bed Without Bedrails 4   Moving Bed to Chair 3   Standing Up From Chair Using Arms 3   Walk in Room 3   Climb 3-5 Stairs With Railing 2   Basic Mobility Inpatient Raw Score 19   Basic Mobility Standardized Score 42.48   [facility] Highest Level Of Mobility   -HLM Goal 6: Walk 10 steps or more   -HLM Achieved 6: Walk 10 steps or more   Modified [place] Scale   Modified [place] Scale 4   Barthel Index   Feeding 5   Bathing 5   Grooming Score 0   Dressing Score 5   Bladder Score 5   Bowels Score 5   Toilet Use Score 5   Transfers (Bed/Chair) Score 5   Mobility (Level Surface) Score 0   Stairs Score 5   Barthel Index Score 40         Gilberto Becerra, PT

## 2024-08-31 NOTE — ASSESSMENT & PLAN NOTE
Currently SpO2: 93 % on Nasal Cannula O2 Flow Rate (L/min): 6 L/min    At baseline, uses 3L    Plan:  Goal > 88%  Obtain chest x-ray  .  Procalcitonin  Adjust O2 as needed

## 2024-08-31 NOTE — ASSESSMENT & PLAN NOTE
Blood pressure systolic on epic 140s, at bedside monitor shows systolic 180  On metoprolol and Entresto daily  Labetalol 10 mg every 8 hours as needed for systolic greater than 180  Monitor BP per unit protocol or when needed

## 2024-08-31 NOTE — ASSESSMENT & PLAN NOTE
"Presents in the emergency department reporting \" not feeling well.\"  Per ED provider the patient was brought to the ED with some chest pain that subsided prior to ED arrival.    Chest x-ray was obtained final results are pending, per personal reading there is no acute cardiopulmonary findings, blood work shows improved creatinine, troponin are negative, urinalysis was not obtained, EKG shows sinus rhythm heart rate 80 bpm, hypertensive  On assessment Mrs. Quintero explained to me that she has been having some chest discomfort for around 5 days, the pain has now subsided, she also offers feeling unwell and not able to take care of herself.  The patient states she does not want to go home because she is unable to take care for herself and that she has presented to the ER because she would like placement to [facility]  PT/OT for further evaluation  Case management consult in place for possible long-term care placement    "

## 2024-08-31 NOTE — OCCUPATIONAL THERAPY NOTE
Occupational Therapy Evaluation     Patient Name: Lauren Quintero  Today's Date: 8/31/2024  Problem List  Principal Problem:    Unable to care for self  Active Problems:    Ambulatory dysfunction    Essential hypertension    Major depressive disorder, recurrent episode, moderate (HCC)    COPD without exacerbation (HCC)    Chronic combined systolic and diastolic CHF (congestive heart failure) (HCC)    Chronic respiratory failure with hypoxia (HCC)    Stage 3b chronic kidney disease (HCC)    Past Medical History  Past Medical History:   Diagnosis Date    Acute congestive heart failure (HCC) 08/27/2021    Anxiety     Cardiac disease     Chest pain 08/27/2021    Chronic pain disorder     CKD (chronic kidney disease) 02/21/2024    COPD (chronic obstructive pulmonary disease) ([facility])     COVID-19 02/15/2024    Depression     Heart disease     Hyperlipidemia     Hypertension     MI (myocardial infarction) ([facility])     MRSA (methicillin resistant Staphylococcus aureus)     Renal disorder     benign kidney tumor     Past Surgical History  Past Surgical History:   Procedure Laterality Date    APPENDECTOMY      CARDIAC CATHETERIZATION N/A 2/21/2024    Procedure: Cardiac catheterization;  Surgeon: Luke Malagon MD;  Location: [facility];  Service: Cardiology    CARDIAC CATHETERIZATION N/A 2/21/2024    Procedure: Cardiac Coronary Angiogram;  Surgeon: Luke Malagon MD;  Location: [facility];  Service: Cardiology    CARDIAC CATHETERIZATION Left 2/21/2024    Procedure: Cardiac Left Heart Cath;  Surgeon: Luke Malagon MD;  Location: [facility];  Service: Cardiology    ELBOW BURSA SURGERY Left 02/2018    D/T MRSA INFECTION    IR THORACENTESIS  2/27/2024    SPINAL FUSION      L7 L9             08/31/24 0830   OT Last Visit   OT Visit Date 08/31/24   Note Type   Note type Evaluation   Pain Assessment   Pain Assessment Tool 0-10   Pain Score No Pain   Restrictions/Precautions   Weight Bearing  Precautions Per Order No   Braces or Orthoses Other (Comment)  (None)   Home Living   Type of Home House   Home Layout One level;Stairs to enter with rails;Performs ADLs on one level  (4 steps to enter)   Bathroom Shower/Tub Tub/shower unit   Bathroom Toilet Standard   Bathroom Equipment Grab bars in shower;Grab bars around toilet;Shower chair;Commode   Bathroom Accessibility Accessible   Home Equipment Other (Comment)  (Rollator)   Prior Function   Level of [place] Independent with ADLs;Independent with functional mobility  (Son helps with cooking and cleaning, mobility with rollator)   Lives With Son   Receives Help From Family   IADLs Family/Friend/Other provides transportation;Family/Friend/Other provides meals;Family/Friend/Other provides medication management   Falls in the last 6 months 1 to 4  (1 fall PTA)   Vocational Retired   Lifestyle   Autonomy Patient was independent with ADL tasks, received assistance for IADL tasks and driving.   Reciprocal Relationships Family   Service to Others Retired   General   Family/Caregiver Present No   Subjective   Subjective I haven't slept in 4 days.   ADL   Where Assessed Other (Comment)  (ADL levels based on functional performance during OT eval)   Eating Assistance 5  Supervision/Setup   Eating Deficit Setup   Grooming Assistance 5  Supervision/Setup   Grooming Deficit Setup  (standing at sink)   UB Bathing Assistance 5  Supervision/Setup   UB Bathing Deficit Setup;Supervision/safety;Increased time to complete   LB Bathing Assistance 4  Minimal Assistance   LB Bathing Deficit Setup;Increased time to complete   UB Dressing Assistance 5  Supervision/Setup   UB Dressing Deficit Setup;Increased time to complete   LB Dressing Assistance 4  Minimal Assistance   LB Dressing Deficit Increased time to complete;Supervision/safety   Toileting Assistance  4  Minimal Assistance   Toileting Deficit Increased time to complete;Grab bar use   Bed Mobility   Supine to Sit 5   Supervision   Additional items Assist x 1;HOB elevated;Bedrails   Additional Comments Pt received in bed and left in chair at end of session   Transfers   Sit to Stand 4  Minimal assistance  (CGA)   Additional items Assist x 1;Armrests   Stand to Sit 5  Supervision   Additional items Assist x 1;Armrests   Toilet transfer 4  Minimal assistance   Additional items Assist x 1   Functional Mobility   Functional Mobility 4  Minimal assistance   Additional items Rolling walker  (min A x 1 for mobility within room to bathroom and back to chair.)   Balance   Static Sitting Good   Dynamic Sitting Fair +   Static Standing Fair   Dynamic Standing Fair -   Ambulatory Poor +   Activity Tolerance   Activity Tolerance Patient limited by fatigue   Medical Staff Made Aware Pt seen as co-evaluation with PT Al due to patient's co-morbidities, clinically unstable presentation, and present impairments which are a regression from the patient's baseline.   Nurse Made Aware RN Ed aware   RUE Assessment   RUE Assessment X  (UE strength grossly 3+/5 during functional activities)   LUE Assessment   LUE Assessment X  (UE strength grossly 3+/5 during functional activities)   Hand Function   Gross Motor Coordination Functional   Fine Motor Coordination Functional   Sensation   Light Touch No apparent deficits   Sharp/Dull No apparent deficits   Stereognosis No apparent deficits   Proprioception   Proprioception No apparent deficits   Vision-Basic Assessment   Current Vision Wears glasses for distance only   Psychosocial   Psychosocial (WDL) WDL   Perception   Inattention/Neglect Appears intact   Motor Planning Appears intact   Perseveration Not present   Cognition   Overall Cognitive Status WFL   Arousal/Participation Alert;Responsive;Cooperative   Attention Within functional limits   Orientation Level Oriented X4   Memory Within functional limits   Following Commands Follows all commands and directions without difficulty   Assessment    Limitation Decreased ADL status;Decreased Safe judgement during ADL;Decreased endurance;Decreased self-care trans;Decreased high-level ADLs   Prognosis Good   Assessment Patient is a 78 y.o. female seen for OT evaluation s/p admit to [facility] on [facility]/30/2024 w/chest pain, feeling well and Unable to care for self. Commorbidities affecting patient's functional performance at time of assessment include:  has a past medical history of Acute congestive heart failure ([facility]), Anxiety, Cardiac disease, Chest pain, Chronic pain disorder, CKD (chronic kidney disease), COPD (chronic obstructive pulmonary disease) ([facility]), COVID-19, Depression, Heart disease, Hyperlipidemia, Hypertension, MI (myocardial infarction) ([facility]), MRSA (methicillin resistant Staphylococcus aureus), and Renal disorder.  Orders placed for OT evaluation and treatment.  Performed at least two patient identifiers during session including name and wristband.  Prior to admission, patient was independent with ADL tasks, used a rollator for mobility and had assistance with IADL tasks . Personal factors affecting patient at time of initial evaluation include: steps to enter, difficulty performing ADLs, and difficulty performing IADLs. Upon evaluation, patient requires supervision assist for UB ADLs, minimal  assist for LB ADLs, transfers and functional ambulation in room and bathroom with minimal  assist, with the use of Rolling Walker.  Patient is oriented x 4. Occupational performance is affected by the following deficits: decreased muscle strength, dynamic sit/ stand balance deficit with poor standing tolerance time for self care and functional mobility, and decreased activity tolerance.  Patient to benefit from continued Occupational Therapy treatment while in the hospital to address deficits as defined above and maximize level of functional independence with ADLs and functional mobility. Occupational Performance areas to address include: grooming  , bathing/ shower, dressing, toilet hygiene, transfer to all surfaces, functional mobility, and community mobility. From OT standpoint, recommendation at time of d/c would be level II (moderate resource intensity).   Plan   Treatment Interventions ADL retraining;Functional transfer training;UE strengthening/ROM;Endurance training;Compensatory technique education   Goal Expiration Date 09/14/24   OT Treatment Day 0   OT Frequency 3-5x/wk   Discharge Recommendation   Rehab Resource Intensity Level, OT II (Moderate Resource Intensity)   AM-PAC Daily Activity Inpatient   Lower Body Dressing 3   Bathing 3   Toileting 3   Upper Body Dressing 3   Grooming 3   Eating 4   Daily Activity Raw Score 19   Daily Activity Standardized Score (Calc for Raw Score >=11) 40.22   AM-PAC Applied Cognition Inpatient   Following a Speech/Presentation 4   Understanding Ordinary Conversation 4   Taking Medications 4   Remembering Where Things Are Placed or Put Away 4   Remembering List of 4-5 Errands 4   Taking Care of Complicated Tasks 4   Applied Cognition Raw Score 24   Applied Cognition Standardized Score 62.21   Barthel Index   Feeding 10   Bathing 0   Grooming Score 0   Dressing Score 5   Bladder Score 5   Bowels Score 5   Toilet Use Score 5   Transfers (Bed/Chair) Score 10   Mobility (Level Surface) Score 0   Stairs Score 5   Barthel Index Score 45   Additional Treatment Session   Start Time 0855   End Time 0905   Treatment Assessment Patient completed UE therex while seated in chair. Therex as follows: 10 reps Shoulder flexion, 10 reps bicep curls, and 10 reps hand pumps. Patient educated on completing therex 2 times per day.     Occupational Therapy Goals: In 7- 10 days:  1.  Patient will complete UB dressing tasks while seated MI.   2. Patient will complete LB ADLs at supervision  with the use of AE as indicated.  3. Patient will increase OOB/ sitting tolerance to 2-4 hours per day for increased participation in self care and  leisure tasks with no s/s of exertion  4. Patient will increase standing tolerance time to 10 minutes with  Unilateral UE support to complete sink level ADLs @ MI level.   5.  Patient/ Family  will demonstrate competency with UE Home Exercise Program.

## 2024-08-31 NOTE — CASE MANAGEMENT
Case Management Discharge Planning Note    Patient name Lauren Quintero  Location /-01 MRN [id number]  : 1945 Date 2024       Current Admission Date: 2024  Current Admission Diagnosis:Unable to care for self   Patient Active Problem List    Diagnosis Date Noted Date Diagnosed    Closed fracture of bones of trunk 2024     Diverticulosis of large intestine without perforation or abscess without bleeding 2024     Encounter for general adult medical examination with abnormal findings 2024     First degree hemorrhoids 2024     Folliculitis ulerythematosa reticulata 2024     History of colonic polyps 2024     Obstructive sleep apnea syndrome 2024     Osteoporosis 2024     Other bursitis, not elsewhere classified, left elbow 2024     Panic disorder 2024     Pure hypercholesterolemia 2024     Thoracic radiculopathy 2024     Tobacco abuse counseling 2024     Vitamin deficiency 2024     Acute bronchitis 2024     Chronic sinusitis 2024     Hypersomnia 2024     Tobacco use 2024     Unable to care for self 2024     Stage 3b chronic kidney disease ([facility]) 2024     Chronic respiratory failure with hypoxia ([facility]) 2024     Acute kidney injury superimposed on chronic kidney disease  ([facility]) 2024     DARLING (acute kidney injury) ([facility]) 2024     Diarrhea 2024     Pain 04/15/2024     Loose stools 2024     Insomnia 2024     Non-ST elevation MI (NSTEMI) ([facility]) 2024     Abnormal chest x-ray 2024     Chronic combined systolic and diastolic CHF (congestive heart failure) ([facility]) 2024     Elevated troponin 2024     Generalized muscle weakness 2024     Major depressive disorder, recurrent, unspecified ([facility]) 2024     Hyperlipidemia, unspecified 2024     Non-Hodgkin lymphoma, unspecified, unspecified site ([facility]) 2024     Oxygen  dependent 01/30/2024     Anxiety disorder 01/30/2024     Unspecified abnormalities of gait and mobility 01/30/2024     Acute and chronic respiratory failure, unspecified whether with hypoxia or hypercapnia (HCC) 01/30/2024     Atherosclerotic heart disease of native coronary artery without angina pectoris 01/30/2024     Unspecified protein-calorie malnutrition (HCC) 01/30/2024     JOHN (iron deficiency anemia) 01/12/2024     On home O2 01/06/2024     MDD (major depressive disorder), recurrent episode, severe (HCC) 01/05/2024     Dizziness 12/14/2023     Social problem 12/13/2023     Diverticulitis 10/29/2023     Moderate protein-calorie malnutrition (HCC) 05/02/2023     BRBPR (bright red blood per rectum) 05/01/2023     Generalized weakness 03/30/2023     Staring episodes 03/30/2023     [place] macroglobulinemia (HCC) 03/06/2023     Agitation 12/20/2022     Influenza A 12/19/2022     Hypomagnesemia 12/11/2022     Lymphoma (HCC) 12/10/2022     Right lower lobe pneumonia 12/10/2022     Hypokalemia 12/10/2022     Sepsis (HCC) 12/10/2022     Severe malnutrition (HCC) 06/15/2022     Acute respiratory failure with hypoxia and hypercapnia (HCC) 06/14/2022     Altered mental status 06/14/2022     Hypertensive emergency 06/14/2022     Severe protein-calorie malnutrition (HCC) 08/28/2021     Positive blood culture 08/28/2021     COPD without exacerbation (HCC) 08/27/2021     Anemia 08/27/2021     Abnormal computed tomography angiography (CTA) 08/27/2021     Fatigue 03/30/2021     Irritable bowel syndrome 09/21/2020     Acute exacerbation of chronic obstructive pulmonary disease (COPD) (HCC) 09/21/2020     Depression 09/20/2020     History of coronary artery disease 09/20/2020     Leukocytosis 09/20/2020     Mixed hyperlipidemia 09/20/2020     Major depressive disorder, recurrent episode, moderate (HCC) 12/30/2019     Flank pain 04/12/2019     Coronary artery disease of native artery of native heart with stable angina  pectoris (HCC) 12/21/2018     Acute on chronic respiratory failure with hypoxia (HCC) 12/19/2018     Ambulatory dysfunction 12/16/2018     Essential hypertension 12/16/2018     Tobacco abuse 12/16/2018     Need for prophylactic hormone replacement therapy (postmenopausal) 06/01/2000     Sciatica 06/01/2000     Plantar fibromatosis 01/06/2000       LOS (days): 0  Geometric Mean LOS (GMLOS) (days):   Days to GMLOS:     OBJECTIVE:  Risk of Unplanned Readmission Score: 43.29         Current admission status: Inpatient   Preferred Pharmacy:   [id number] [place] - [address]  [address]  [place] [address]  Phone: [phone] Fax: [phone]    [facility] [place] - [address]  [address]  [address]  [place] [address]  Phone: [phone] Fax: [phone]    [place], [place] - [address] [facility]  [address] [place] [address]  Phone: [phone] Fax: [phone]    [facility] [place] - [address]  [address] [facility] [place] [address]  Phone: [phone] Fax: [phone]    Primary Care Provider: Starla Bateman MD    Primary Insurance: MEDICARE  Secondary Insurance:     DISCHARGE DETAILS:                                          Other Referral/Resources/Interventions Provided:  Referral Comments: Pt has been accepted and [facility] [place] and [facility].  CM left message for son Imer to call back.  He was hoping for [facility].  CM will continue to follow    Would you like to participate in our [facility] service program?  : No - Declined    Treatment Team Recommendation: Short Term Rehab  Discharge Destination Plan:: Short Term Rehab

## 2024-08-31 NOTE — ASSESSMENT & PLAN NOTE
Blood Pressure: 132/86  On metoprolol and Entresto daily  Labetalol 10 mg every 8 hours as needed for systolic greater than 180  Monitor BP per unit protocol or when needed

## 2024-09-01 PROBLEM — J96.11 CHRONIC RESPIRATORY FAILURE WITH HYPOXIA (HCC): Status: ACTIVE | Noted: 2018-12-19

## 2024-09-01 PROBLEM — J96.21 ACUTE ON CHRONIC RESPIRATORY FAILURE WITH HYPOXIA (HCC): Status: ACTIVE | Noted: 2018-12-19

## 2024-09-01 NOTE — PLAN OF CARE
Problem: Prexisting or High Potential for Compromised Skin Integrity  Goal: Skin integrity is maintained or improved  Description: INTERVENTIONS:  - Identify patients at risk for skin breakdown  - Assess and monitor skin integrity  - Assess and monitor nutrition and hydration status  - Monitor labs   - Assess for incontinence   - Turn and reposition patient  - Assist with mobility/ambulation  - Relieve pressure over bony prominences  - Avoid friction and shearing  - Provide appropriate hygiene as needed including keeping skin clean and dry  - Evaluate need for skin moisturizer/barrier cream  - Collaborate with interdisciplinary team   - Patient/family teaching  - Consider wound care consult   Outcome: Progressing     Problem: PAIN - ADULT  Goal: Verbalizes/displays adequate comfort level or baseline comfort level  Description: Interventions:  - Encourage patient to monitor pain and request assistance  - Assess pain using appropriate pain scale  - Administer analgesics based on type and severity of pain and evaluate response  - Implement non-pharmacological measures as appropriate and evaluate response  - Consider cultural and social influences on pain and pain management  - Notify physician/advanced practitioner if interventions unsuccessful or patient reports new pain  Outcome: Progressing     Problem: INFECTION - ADULT  Goal: Absence or prevention of progression during hospitalization  Description: INTERVENTIONS:  - Assess and monitor for signs and symptoms of infection  - Monitor lab/diagnostic results  - Monitor all insertion sites, i.e. indwelling lines, tubes, and drains  - Monitor endotracheal if appropriate and nasal secretions for changes in amount and color  - [place] appropriate cooling/warming therapies per order  - Administer medications as ordered  - Instruct and encourage patient and family to use good hand hygiene technique  - Identify and instruct in appropriate isolation precautions for  identified infection/condition  Outcome: Progressing

## 2024-09-01 NOTE — PLAN OF CARE
Problem: Prexisting or High Potential for Compromised Skin Integrity  Goal: Skin integrity is maintained or improved  Description: INTERVENTIONS:  - Identify patients at risk for skin breakdown  - Assess and monitor skin integrity  - Assess and monitor nutrition and hydration status  - Monitor labs   - Assess for incontinence   - Turn and reposition patient  - Assist with mobility/ambulation  - Relieve pressure over bony prominences  - Avoid friction and shearing  - Provide appropriate hygiene as needed including keeping skin clean and dry  - Evaluate need for skin moisturizer/barrier cream  - Collaborate with interdisciplinary team   - Patient/family teaching  - Consider wound care consult   Outcome: Progressing     Problem: PAIN - ADULT  Goal: Verbalizes/displays adequate comfort level or baseline comfort level  Description: Interventions:  - Encourage patient to monitor pain and request assistance  - Assess pain using appropriate pain scale  - Administer analgesics based on type and severity of pain and evaluate response  - Implement non-pharmacological measures as appropriate and evaluate response  - Consider cultural and social influences on pain and pain management  - Notify physician/advanced practitioner if interventions unsuccessful or patient reports new pain  Outcome: Progressing     Problem: INFECTION - ADULT  Goal: Absence or prevention of progression during hospitalization  Description: INTERVENTIONS:  - Assess and monitor for signs and symptoms of infection  - Monitor lab/diagnostic results  - Monitor all insertion sites, i.e. indwelling lines, tubes, and drains  - Monitor endotracheal if appropriate and nasal secretions for changes in amount and color  - [place] appropriate cooling/warming therapies per order  - Administer medications as ordered  - Instruct and encourage patient and family to use good hand hygiene technique  - Identify and instruct in appropriate isolation precautions for  identified infection/condition  Outcome: Progressing  Goal: Absence of fever/infection during neutropenic period  Description: INTERVENTIONS:  - Monitor WBC    Outcome: Progressing     Problem: SAFETY ADULT  Goal: Patient will remain free of falls  Description: INTERVENTIONS:  - Educate patient/family on patient safety including physical limitations  - Instruct patient to call for assistance with activity   - Consult OT/PT to assist with strengthening/mobility   - Keep Call bell within reach  - Keep bed low and locked with side rails adjusted as appropriate  - Keep care items and personal belongings within reach  - Initiate and maintain comfort rounds  - Make Fall Risk Sign visible to staff  - Offer Toileting every 3 Hours, in advance of need  - Initiate/Maintain bed alarm  - Obtain necessary fall risk management equipment:   - Apply yellow socks and bracelet for high fall risk patients  - Consider moving patient to room near nurses station  Outcome: Progressing  Goal: Maintain or return to baseline ADL function  Description: INTERVENTIONS:  -  Assess patient's ability to carry out ADLs; assess patient's baseline for ADL function and identify physical deficits which impact ability to perform ADLs (bathing, care of mouth/teeth, toileting, grooming, dressing, etc.)  - Assess/evaluate cause of self-care deficits   - Assess range of motion  - Assess patient's mobility; develop plan if impaired  - Assess patient's need for assistive devices and provide as appropriate  - Encourage maximum independence but intervene and supervise when necessary  - Involve family in performance of ADLs  - Assess for home care needs following discharge   - Consider OT consult to assist with ADL evaluation and planning for discharge  - Provide patient education as appropriate  Outcome: Progressing  Goal: Maintains/Returns to pre admission functional level  Description: INTERVENTIONS:  - Perform AM-PAC 6 Click Basic Mobility/ Daily Activity  assessment daily.  - Set and communicate daily mobility goal to care team and patient/family/caregiver.   - Collaborate with rehabilitation services on mobility goals if consulted  - Perform Range of Motion 3 times a day.  - Reposition patient every 3 hours.  - Dangle patient 3 times a day  - Stand patient 3 times a day  - Ambulate patient 3 times a day  - Out of bed to chair 3 times a day   - Out of bed for meals 3 times a day  - Out of bed for toileting  - Record patient progress and toleration of activity level   Outcome: Progressing     Problem: DISCHARGE PLANNING  Goal: Discharge to home or other facility with appropriate resources  Description: INTERVENTIONS:  - Identify barriers to discharge w/patient and caregiver  - Arrange for needed discharge resources and transportation as appropriate  - Identify discharge learning needs (meds, wound care, etc.)  - Arrange for interpretive services to assist at discharge as needed  - Refer to Case Management Department for coordinating discharge planning if the patient needs post-hospital services based on physician/advanced practitioner order or complex needs related to functional status, cognitive ability, or social support system  Outcome: Progressing     Problem: Knowledge Deficit  Goal: Patient/family/caregiver demonstrates understanding of disease process, treatment plan, medications, and discharge instructions  Description: Complete learning assessment and assess knowledge base.  Interventions:  - Provide teaching at level of understanding  - Provide teaching via preferred learning methods  Outcome: Progressing     Problem: CARDIOVASCULAR - ADULT  Goal: Maintains optimal cardiac output and hemodynamic stability  Description: INTERVENTIONS:  - Monitor I/O, vital signs and rhythm  - Monitor for S/S and trends of decreased cardiac output  - Administer and titrate ordered vasoactive medications to optimize hemodynamic stability  - Assess quality of pulses, skin color  and temperature  - Assess for signs of decreased coronary artery perfusion  - Instruct patient to report change in severity of symptoms  Outcome: Progressing  Goal: Absence of cardiac dysrhythmias or at baseline rhythm  Description: INTERVENTIONS:  - Continuous cardiac monitoring, vital signs, obtain 12 lead EKG if ordered  - Administer antiarrhythmic and heart rate control medications as ordered  - Monitor electrolytes and administer replacement therapy as ordered  Outcome: Progressing     Problem: RESPIRATORY - ADULT  Goal: Achieves optimal ventilation and oxygenation  Description: INTERVENTIONS:  - Assess for changes in respiratory status  - Assess for changes in mentation and behavior  - Position to facilitate oxygenation and minimize respiratory effort  - Oxygen administered by appropriate delivery if ordered  - Initiate smoking cessation education as indicated  - Encourage broncho-pulmonary hygiene including cough, deep breathe, Incentive Spirometry  - Assess the need for suctioning and aspirate as needed  - Assess and instruct to report SOB or any respiratory difficulty  - Respiratory Therapy support as indicated  Outcome: Progressing     Problem: METABOLIC, FLUID AND ELECTROLYTES - ADULT  Goal: Fluid balance maintained  Description: INTERVENTIONS:  - Monitor labs   - Monitor I/O and WT  - Instruct patient on fluid and nutrition as appropriate  - Assess for signs & symptoms of volume excess or deficit  Outcome: Progressing

## 2024-09-01 NOTE — RESPIRATORY THERAPY NOTE
"RT Protocol Note  Lauren Quintero 78 y.o. female MRN: [id number]  Unit/Bed#: -01 Encounter: [id number]    Assessment    Principal Problem:    Unable to care for self  Active Problems:    Ambulatory dysfunction    Essential hypertension    Major depressive disorder, recurrent episode, moderate (HCC)    COPD without exacerbation (HCC)    Chronic combined systolic and diastolic CHF (congestive heart failure) (HCC)    Chronic respiratory failure with hypoxia (HCC)    Stage 3b chronic kidney disease (HCC)    Physical Exam:   Assessment Type: Post-treatment  General Appearance: Alert, Awake  Respiratory Pattern: Normal  Chest Assessment: Chest expansion symmetrical  Bilateral Breath Sounds: Diminished  O2 Device: nc    Vitals:  Blood pressure 129/79, pulse 105, temperature 100 °F (37.8 °C), resp. rate 18, height 5' 7\" (1.702 m), weight 55.3 kg (121 lb 14.6 oz), SpO2 94%, not currently breastfeeding.    O2 Device: nc     Plan    Respiratory Plan: Home Bronchodilator Patient pathway        Resp Comments: will cont w current orders, pt was drowsy and need encouragement with acapella.   "

## 2024-09-01 NOTE — PLAN OF CARE
Problem: Prexisting or High Potential for Compromised Skin Integrity  Goal: Skin integrity is maintained or improved  Description: INTERVENTIONS:  - Identify patients at risk for skin breakdown  - Assess and monitor skin integrity  - Assess and monitor nutrition and hydration status  - Monitor labs   - Assess for incontinence   - Turn and reposition patient  - Assist with mobility/ambulation  - Relieve pressure over bony prominences  - Avoid friction and shearing  - Provide appropriate hygiene as needed including keeping skin clean and dry  - Evaluate need for skin moisturizer/barrier cream  - Collaborate with interdisciplinary team   - Patient/family teaching  - Consider wound care consult   Outcome: Progressing     Problem: PAIN - ADULT  Goal: Verbalizes/displays adequate comfort level or baseline comfort level  Description: Interventions:  - Encourage patient to monitor pain and request assistance  - Assess pain using appropriate pain scale  - Administer analgesics based on type and severity of pain and evaluate response  - Implement non-pharmacological measures as appropriate and evaluate response  - Consider cultural and social influences on pain and pain management  - Notify physician/advanced practitioner if interventions unsuccessful or patient reports new pain  Outcome: Progressing     Problem: INFECTION - ADULT  Goal: Absence or prevention of progression during hospitalization  Description: INTERVENTIONS:  - Assess and monitor for signs and symptoms of infection  - Monitor lab/diagnostic results  - Monitor all insertion sites, i.e. indwelling lines, tubes, and drains  - Monitor endotracheal if appropriate and nasal secretions for changes in amount and color  - [place] appropriate cooling/warming therapies per order  - Administer medications as ordered  - Instruct and encourage patient and family to use good hand hygiene technique  - Identify and instruct in appropriate isolation precautions for  identified infection/condition  Outcome: Progressing  Goal: Absence of fever/infection during neutropenic period  Description: INTERVENTIONS:  - Monitor WBC    Outcome: Progressing     Problem: SAFETY ADULT  Goal: Patient will remain free of falls  Description: INTERVENTIONS:  - Educate patient/family on patient safety including physical limitations  - Instruct patient to call for assistance with activity   - Consult OT/PT to assist with strengthening/mobility   - Keep Call bell within reach  - Keep bed low and locked with side rails adjusted as appropriate  - Keep care items and personal belongings within reach  - Initiate and maintain comfort rounds  - Make Fall Risk Sign visible to staff  - Offer Toileting every 2 Hours, in advance of need  - Initiate/Maintain alarm  - Obtain necessary fall risk management equipment:   - Apply yellow socks and bracelet for high fall risk patients  - Consider moving patient to room near nurses station  Outcome: Progressing  Goal: Maintain or return to baseline ADL function  Description: INTERVENTIONS:  -  Assess patient's ability to carry out ADLs; assess patient's baseline for ADL function and identify physical deficits which impact ability to perform ADLs (bathing, care of mouth/teeth, toileting, grooming, dressing, etc.)  - Assess/evaluate cause of self-care deficits   - Assess range of motion  - Assess patient's mobility; develop plan if impaired  - Assess patient's need for assistive devices and provide as appropriate  - Encourage maximum independence but intervene and supervise when necessary  - Involve family in performance of ADLs  - Assess for home care needs following discharge   - Consider OT consult to assist with ADL evaluation and planning for discharge  - Provide patient education as appropriate  Outcome: Progressing  Goal: Maintains/Returns to pre admission functional level  Description: INTERVENTIONS:  - Perform AM-PAC 6 Click Basic Mobility/ Daily Activity  assessment daily.  - Set and communicate daily mobility goal to care team and patient/family/caregiver.   - Collaborate with rehabilitation services on mobility goals if consulted  - Perform Range of Motion 3 times a day.  - Reposition patient every 2 hours.  - Dangle patient 3 times a day  - Stand patient 3 times a day  - Ambulate patient 3 times a day  - Out of bed to chair 3 times a day   - Out of bed for meals 3 times a day  - Out of bed for toileting  - Record patient progress and toleration of activity level   Outcome: Progressing     Problem: DISCHARGE PLANNING  Goal: Discharge to home or other facility with appropriate resources  Description: INTERVENTIONS:  - Identify barriers to discharge w/patient and caregiver  - Arrange for needed discharge resources and transportation as appropriate  - Identify discharge learning needs (meds, wound care, etc.)  - Arrange for interpretive services to assist at discharge as needed  - Refer to Case Management Department for coordinating discharge planning if the patient needs post-hospital services based on physician/advanced practitioner order or complex needs related to functional status, cognitive ability, or social support system  Outcome: Progressing     Problem: Knowledge Deficit  Goal: Patient/family/caregiver demonstrates understanding of disease process, treatment plan, medications, and discharge instructions  Description: Complete learning assessment and assess knowledge base.  Interventions:  - Provide teaching at level of understanding  - Provide teaching via preferred learning methods  Outcome: Progressing     Problem: CARDIOVASCULAR - ADULT  Goal: Maintains optimal cardiac output and hemodynamic stability  Description: INTERVENTIONS:  - Monitor I/O, vital signs and rhythm  - Monitor for S/S and trends of decreased cardiac output  - Administer and titrate ordered vasoactive medications to optimize hemodynamic stability  - Assess quality of pulses, skin color  and temperature  - Assess for signs of decreased coronary artery perfusion  - Instruct patient to report change in severity of symptoms  Outcome: Progressing  Goal: Absence of cardiac dysrhythmias or at baseline rhythm  Description: INTERVENTIONS:  - Continuous cardiac monitoring, vital signs, obtain 12 lead EKG if ordered  - Administer antiarrhythmic and heart rate control medications as ordered  - Monitor electrolytes and administer replacement therapy as ordered  Outcome: Progressing     Problem: RESPIRATORY - ADULT  Goal: Achieves optimal ventilation and oxygenation  Description: INTERVENTIONS:  - Assess for changes in respiratory status  - Assess for changes in mentation and behavior  - Position to facilitate oxygenation and minimize respiratory effort  - Oxygen administered by appropriate delivery if ordered  - Initiate smoking cessation education as indicated  - Encourage broncho-pulmonary hygiene including cough, deep breathe, Incentive Spirometry  - Assess the need for suctioning and aspirate as needed  - Assess and instruct to report SOB or any respiratory difficulty  - Respiratory Therapy support as indicated  Outcome: Progressing     Problem: METABOLIC, FLUID AND ELECTROLYTES - ADULT  Goal: Fluid balance maintained  Description: INTERVENTIONS:  - Monitor labs   - Monitor I/O and WT  - Instruct patient on fluid and nutrition as appropriate  - Assess for signs & symptoms of volume excess or deficit  Outcome: Progressing

## 2024-09-01 NOTE — RESPIRATORY THERAPY NOTE
08/31/24 1938   Respiratory Protocol   Protocol Initiated? No   Protocol Selection Respiratory   Language Barrier? No   Medical & Social History Reviewed? Yes   Diagnostic Studies Reviewed? Yes   Physical Assessment Performed? Yes   Home Devices/Therapy Home O2   Respiratory Plan Home Bronchodilator Patient pathway   Respiratory Assessment   Assessment Type Post-treatment   General Appearance Alert   Respiratory Pattern Normal   Chest Assessment Chest expansion symmetrical   Bilateral Breath Sounds Diminished   Resp Comments pt admitted awaiting placement continue with home medication orders

## 2024-09-01 NOTE — PROGRESS NOTES
"[facility]   Progress Note  Name: Lauren Quintero I  MRN: [id number]  Unit/Bed#: -01 I Date of Admission: 8/30/2024   Date of Service: 9/1/2024 I Hospital Day: 1    Acute on chronic respiratory failure with hypoxia (HCC)  Assessment & Plan  Currently SpO2: 93 % on Nasal Cannula O2 Flow Rate (L/min): 6 L/min    At baseline, uses 3L    Plan:  Goal > 88%  Obtain chest x-ray  .  Procalcitonin  Adjust O2 as needed      COPD without exacerbation (HCC)  Assessment & Plan  On chronic supplemental oxygen at 4 L nasal cannula, the patient is in no acute exacerbation  On Anoro Ellipta and Atrovent daily  Monitor O2 sats per unit protocol    Stage 3b chronic kidney disease (HCC)  Assessment & Plan  Lab Results   Component Value Date    EGFR 41 08/31/2024    EGFR 37 08/30/2024    EGFR 25 07/19/2024    CREATININE 1.26 08/31/2024    CREATININE 1.36 (H) 08/30/2024    CREATININE 1.88 (H) 07/19/2024   Baseline appears to be 1.5  Avoid nephrotoxic medications, NSAIDs and contrast dye if possible  Monitor kidney function with daily BMPs    Major depressive disorder, recurrent episode, moderate (HCC)  Assessment & Plan  History of depression on Vrylar, Wellbutrin and Vilazdone   Denies SI  Stable    * Unable to care for self  Assessment & Plan  Presents in the emergency department reporting \" not feeling well.\"  Per ED provider the patient was brought to the ED with some chest pain that subsided prior to ED arrival.    Chest x-ray was obtained final results are pending, per personal reading there is no acute cardiopulmonary findings, blood work shows improved creatinine, troponin are negative, urinalysis was not obtained, EKG shows sinus rhythm heart rate 80 bpm, hypertensive  On assessment Mrs. Quintero explained to me that she has been having some chest discomfort for around 5 days, the pain has now subsided, she also offers feeling unwell and not able to take care of herself.  The patient states she does not " want to go home because she is unable to take care for herself and that she has presented to the ER because she would like placement to [facility]  PT/OT for further evaluation  Case management consult in place for possible long-term care placement      Ambulatory dysfunction  Assessment & Plan  Patient states she has been mobilizing around the house with a walker, however reports physical mobility has been declining lately and mostly she is spent her time in her bed  The patient also reports difficulty performing her ADLs  The patient has asked me for help to seek permanent placement at a long-term care facility, case management consult in place  PT and OT consulted for further evaluation      Essential hypertension  Assessment & Plan  Blood Pressure: 132/86  On metoprolol and Entresto daily  Labetalol 10 mg every 8 hours as needed for systolic greater than 180  Monitor BP per unit protocol or when needed    Chronic combined systolic and diastolic CHF (congestive heart failure) (HCC)  Assessment & Plan  Wt Readings from Last 3 Encounters:   08/30/24 55.3 kg (121 lb 14.6 oz)   06/14/24 56.1 kg (123 lb 10.9 oz)   06/11/24 54.9 kg (121 lb)     On assessment the patient is euvolemic, in no acute exacerbation  Weight shows a loss of 2 pounds  Continue with Entresto daily  Daily weights  Strict intake and output        VTE Pharmacologic Prophylaxis: VTE Score: 5 High Risk (Score >/= 5) - Pharmacological DVT Prophylaxis Ordered: heparin. Sequential Compression Devices Ordered.    Mobility:   Basic Mobility Inpatient Raw Score: 20  JH-HLM Goal: 6: Walk 10 steps or more  JH-HLM Achieved: 6: Walk 10 steps or more  JH-HLM Goal achieved. Continue to encourage appropriate mobility.    Patient Centered Rounds: I performed bedside rounds with nursing staff today.  Discussions with Specialists or Other Care Team Provider:  IP CONSULT TO CASE MANAGEMENT  IP CONSULT TO CASE MANAGEMENT      Education and Discussions with  Family / Patient: patient, called son, left VM    Total Time Spent on Date of Encounter in care of patient: 30+ mins. This time was spent on one or more of the following: performing physical exam; counseling and coordination of care; obtaining or reviewing history; documenting in the medical record; reviewing/ordering tests, medications or procedures; communicating with other healthcare professionals and discussing with patient's family/caregivers.    Current Length of Stay: 1 day(s)  Current Patient Status: Inpatient   Certification Statement: The patient will continue to require additional inpatient hospital stay due to plan as noted above  Discharge Plan: Anticipate discharge in 48-72 hrs to rehab facility.    Code Status: Level 3 - DNAR and DNI    Subjective:   This morning, noted to be severely hypoxic, requiring increasing oxygen through nasal cannula, now requiring 6 L on baseline is a 3  Objective:     Vitals:   Temp (24hrs), Av.6 °F (37 °C), Min:98 °F (36.7 °C), Max:100 °F (37.8 °C)    Temp:  [98 °F (36.7 °C)-100 °F (37.8 °C)] 98.1 °F (36.7 °C)  HR:  [104-131] 104  Resp:  [16-19] 16  BP: ()/(62-94) 102/65  SpO2:  [88 %-95 %] 93 %  Body mass index is 19.09 kg/m².     Input and Output Summary (last 24 hours):     Intake/Output Summary (Last 24 hours) at 2024 1626  Last data filed at 2024 0937  Gross per 24 hour   Intake 600 ml   Output --   Net 600 ml       Physical Exam:   Physical Exam  Vitals and nursing note reviewed.   Constitutional:       General: She is not in acute distress.     Appearance: She is ill-appearing (Chronically). She is not toxic-appearing.      Comments: Elderly, frail   HENT:      Head: Normocephalic.      Nose: Nose normal.      Mouth/Throat:      Mouth: Mucous membranes are dry.   Eyes:      General: No scleral icterus.     Conjunctiva/sclera: Conjunctivae normal.   Cardiovascular:      Rate and Rhythm: Normal rate.      Pulses: Normal pulses.           Radial  pulses are 2+ on the right side and 2+ on the left side.      Heart sounds: Normal heart sounds.   Pulmonary:      Effort: Respiratory distress present.      Breath sounds: Normal breath sounds. No wheezing, rhonchi or rales.   Abdominal:      General: Bowel sounds are normal. There is no distension.      Palpations: Abdomen is soft.      Tenderness: There is no abdominal tenderness.   Musculoskeletal:         General: No tenderness.   Skin:     General: Skin is dry.      Coloration: Skin is not jaundiced.   Neurological:      Mental Status: She is alert.   Psychiatric:         Mood and Affect: Mood normal.         Behavior: Behavior normal. Behavior is cooperative.          Additional Data:     Labs:  Results from last 7 days   Lab Units 09/01/24  0621 08/31/24  0442   WBC Thousand/uL 11.37* 5.54   HEMOGLOBIN g/dL 12.0 11.8   HEMATOCRIT % 36.5 38.2   PLATELETS Thousands/uL 193 183   SEGS PCT %  --  39*   LYMPHO PCT %  --  47*   MONO PCT %  --  10   EOS PCT %  --  3     Results from last 7 days   Lab Units 09/01/24  0621 08/31/24  0442   SODIUM mmol/L 138 142   POTASSIUM mmol/L 4.3 4.4   CHLORIDE mmol/L 99 100   CO2 mmol/L 32 36*   BUN mg/dL 24 23   CREATININE mg/dL 1.45* 1.26   ANION GAP mmol/L 7 6   CALCIUM mg/dL 9.7 9.9   ALBUMIN g/dL  --  4.0   TOTAL BILIRUBIN mg/dL  --  0.42   ALK PHOS U/L  --  87   ALT U/L  --  3*   AST U/L  --  13   GLUCOSE RANDOM mg/dL 113 81                       Lines/Drains:  Invasive Devices       Peripheral Intravenous Line  Duration             Peripheral IV 08/30/24 Left Antecubital 1 day                          Imaging: Reviewed radiology reports from this admission including:   XR chest 1 view portable    Result Date: 8/31/2024  Impression: No acute cardiopulmonary disease. Workstation performed: [id number]       XR chest 1 view portable    Result Date: 8/31/2024  Impression No acute cardiopulmonary disease. Workstation performed: [id number]        Results for orders placed during  the hospital encounter of 02/15/24    Echo complete w/ contrast if indicated    Interpretation Summary    Left Ventricle: Left ventricular cavity size is normal. Wall thickness is mildly increased. There is mild asymmetric hypertrophy of the basal septal wall. The left ventricular ejection fraction is 35%. Systolic function is severely reduced. Diastolic function is mildly abnormal, consistent with grade I (abnormal) relaxation.    The following segments are hypokinetic: mid anteroseptal, mid inferoseptal, apical anterior, apical septal, apical inferior and apex.    Mitral Valve: There is severe annular calcification.    Tricuspid Valve: There is mild regurgitation.    IVC/SVC: The inferior vena cava is dilated.    Pulmonary Artery: The estimated pulmonary artery systolic pressure is 40.0 mmHg. The pulmonary artery systolic pressure is mildly increased.      Recent Cultures (last 7 days):         Last 24 Hours Medication List:   Current Facility-Administered Medications   Medication Dose Route Frequency Provider Last Rate    acetaminophen  650 mg Oral Q6H PRN CHANA ChristensenNP      aluminum-magnesium hydroxide-simethicone  30 mL Oral Q6H PRN TAMMIE Christensen      aspirin  81 mg Oral Daily Connie Murdock CRNP      buPROPion  75 mg Oral Daily Connie Murdock, CRNP      gabapentin  100 mg Oral HS TAMMIE Christensen      guaiFENesin  1,200 mg Oral Q12H ROSELINE Jose Shelton, DO      heparin (porcine)  5,000 Units Subcutaneous Q8H [facility] TAMMIE Christensen      hydrALAZINE  10 mg Intravenous Once TAMMIE Christensen      hydrOXYzine HCL  25 mg Oral Q6H PRN TAMMIE Christensen      levalbuterol  1.25 mg Nebulization TID Abhilash Coombs, DO      melatonin  3 mg Oral HS TAMMIE Christensen      metoprolol succinate  100 mg Oral Daily CHANA ChristensenNP      ondansetron  4 mg Intravenous Q6H PRN TAMMIE Christensen      pantoprazole  40 mg Oral Daily CHANA ChristensenNP      polyethylene glycol  17 g Oral Daily PRN Connie  TAMMIE Murdock      pravastatin  80 mg Oral QPM TAMMIE Christensen      sacubitril-valsartan  1 tablet Oral BID TAMMIE Christensen      umeclidinium-vilanterol  1 puff Inhalation Daily TAMMIE Christensen      vilazodone  40 mg Oral Daily With Breakfast TAMMIE Christensen          Today, Patient Was Seen By: Jose Shelton DO    **Please Note: This note may have been constructed using a voice recognition system.**

## 2024-09-02 PROBLEM — N18.9 ACUTE ON CHRONIC RENAL FAILURE  (HCC): Status: ACTIVE | Noted: 2024-01-01

## 2024-09-02 PROBLEM — A41.9 SEVERE SEPSIS (HCC): Status: ACTIVE | Noted: 2024-01-01

## 2024-09-02 PROBLEM — R65.20 SEVERE SEPSIS (HCC): Status: ACTIVE | Noted: 2024-01-01

## 2024-09-02 PROBLEM — F33.9 MAJOR DEPRESSIVE DISORDER, RECURRENT, UNSPECIFIED (HCC): Status: RESOLVED | Noted: 2024-01-01 | Resolved: 2024-01-01

## 2024-09-02 PROBLEM — N17.9 ACUTE ON CHRONIC RENAL FAILURE  (HCC): Status: ACTIVE | Noted: 2024-01-01

## 2024-09-02 NOTE — RESPIRATORY THERAPY NOTE
09/02/24 1907   Respiratory Assessment   Assessment Type Pre-treatment   General Appearance Sleeping   Respiratory Pattern Assisted   Chest Assessment Chest expansion symmetrical   Bilateral Breath Sounds Diminished;Rhonchi   Resp Comments titrated FIO2 to 70 due to the patient's SPO2   Non-Invasive Information   $ Pulse Oximetry Spot Check Charge Completed   Non-Invasive Settings   FiO2 (%) 70   IPAP (cm) 14 cm   EPAP (cm) 8 cm   Rate (Set) 8   Pressure Support (cm H2O) 6   Rise Time 2   Inspiratory Time (Set) 1   Non-Invasive Readings   Skin Intervention Skin intact   Heater Temperature (Obs) 31.1   Total Rate 24   MV (Mech) 11.7   Peak Pressure (Obs) 15   Spontaneous Vt (mL) 444   Leak (lpm) 21   Non-Invasive Alarms   Insp Pressure High (cm H20) 25   Insp Pressure Low (cm H20) 5   Low Insp Pressure Time (sec) 20 sec   MV Low (L/min) 3   Vt High (mL) 1200   Vt Low (mL) 150   High Resp Rate (BPM) 45 BPM   Low Resp Rate (BPM) 8 BPM

## 2024-09-02 NOTE — ASSESSMENT & PLAN NOTE
Patient describes inability to perform her own ADLs   Requesting evaluation for long term placement

## 2024-09-02 NOTE — SPEECH THERAPY NOTE
Speech Language/Pathology Missed Visit Note    Dysphagia evaluation orders received and appreciated.  Evaluation attempted, however pt O2 saturation ~84% w/ increased RR.  RN present and awaiting RT.  Reported concerns w/ aspiration overnight.  Pt not appropriate for PO intake at this time; evaluation deferred.  Will re-attempt once respiratory status stabilized.  Suggest to continue NPO status until patient able to participate in formal dysphagia evaluation as she is well-known to this department with hx oropharyngeal dysphagia.  Nursing aware.      Corine Gastelum MS, CCC-SLP  Speech-Language Pathologist  [place] #[id number]  [place] #[id number]

## 2024-09-02 NOTE — PLAN OF CARE
Problem: Nutrition/Hydration-ADULT  Goal: Nutrient/Hydration intake appropriate for improving, restoring or maintaining nutritional needs  Description: Monitor and assess patient's nutrition/hydration status for malnutrition. Collaborate with interdisciplinary team and initiate plan and interventions as ordered.  Monitor patient's weight and dietary intake as ordered or per policy. Utilize nutrition screening tool and intervene as necessary. Determine patient's food preferences and provide high-protein, high-caloric foods as appropriate.     INTERVENTIONS:  - Monitor oral intake, urinary output, labs, and treatment plans  - Assess nutrition and hydration status and recommend course of action  - Evaluate amount of meals eaten  - Assist patient with eating if necessary   - Allow adequate time for meals  - Recommend/ encourage appropriate diets, oral nutritional supplements, and vitamin/mineral supplements  - Order, calculate, and assess calorie counts as needed  - Recommend, monitor, and adjust tube feedings and TPN/PPN based on assessed needs  - Assess need for intravenous fluids  - Provide specific nutrition/hydration education as appropriate  - Include patient/family/caregiver in decisions related to nutrition  Outcome: Not Progressing   NPO, will monitor diet advancement/tolerance.

## 2024-09-02 NOTE — NURSING NOTE
"Pt has been restless during the night. Provider notified that HR has been elevated 120-125 for periods of the night. It is also noted that she sounds very congested but has a very weak non-productive cough.  Provider's reply is \"ok, thank you \" no new orders received at this time    Pt plan of care on going - no further concerns as of present and expresses no other needs at this time- call light within reach.     "

## 2024-09-02 NOTE — PLAN OF CARE
Problem: Prexisting or High Potential for Compromised Skin Integrity  Goal: Skin integrity is maintained or improved  Description: INTERVENTIONS:  - Identify patients at risk for skin breakdown  - Assess and monitor skin integrity  - Assess and monitor nutrition and hydration status  - Monitor labs   - Assess for incontinence   - Turn and reposition patient  - Assist with mobility/ambulation  - Relieve pressure over bony prominences  - Avoid friction and shearing  - Provide appropriate hygiene as needed including keeping skin clean and dry  - Evaluate need for skin moisturizer/barrier cream  - Collaborate with interdisciplinary team   - Patient/family teaching  - Consider wound care consult   Outcome: Progressing     Problem: PAIN - ADULT  Goal: Verbalizes/displays adequate comfort level or baseline comfort level  Description: Interventions:  - Encourage patient to monitor pain and request assistance  - Assess pain using appropriate pain scale  - Administer analgesics based on type and severity of pain and evaluate response  - Implement non-pharmacological measures as appropriate and evaluate response  - Consider cultural and social influences on pain and pain management  - Notify physician/advanced practitioner if interventions unsuccessful or patient reports new pain  Outcome: Progressing     Problem: INFECTION - ADULT  Goal: Absence or prevention of progression during hospitalization  Description: INTERVENTIONS:  - Assess and monitor for signs and symptoms of infection  - Monitor lab/diagnostic results  - Monitor all insertion sites, i.e. indwelling lines, tubes, and drains  - Monitor endotracheal if appropriate and nasal secretions for changes in amount and color  - [place] appropriate cooling/warming therapies per order  - Administer medications as ordered  - Instruct and encourage patient and family to use good hand hygiene technique  - Identify and instruct in appropriate isolation precautions for  identified infection/condition  Outcome: Progressing  Goal: Absence of fever/infection during neutropenic period  Description: INTERVENTIONS:  - Monitor WBC    Outcome: Progressing     Problem: SAFETY ADULT  Goal: Patient will remain free of falls  Description: INTERVENTIONS:  - Educate patient/family on patient safety including physical limitations  - Instruct patient to call for assistance with activity   - Consult OT/PT to assist with strengthening/mobility   - Keep Call bell within reach  - Keep bed low and locked with side rails adjusted as appropriate  - Keep care items and personal belongings within reach  - Initiate and maintain comfort rounds  - Make Fall Risk Sign visible to staff  - Offer Toileting every 2 Hours, in advance of need  - Initiate/Maintain bed alarm  - Apply yellow socks and bracelet for high fall risk patients  - Consider moving patient to room near nurses station  Outcome: Progressing  Goal: Maintain or return to baseline ADL function  Description: INTERVENTIONS:  -  Assess patient's ability to carry out ADLs; assess patient's baseline for ADL function and identify physical deficits which impact ability to perform ADLs (bathing, care of mouth/teeth, toileting, grooming, dressing, etc.)  - Assess/evaluate cause of self-care deficits   - Assess range of motion  - Assess patient's mobility; develop plan if impaired  - Assess patient's need for assistive devices and provide as appropriate  - Encourage maximum independence but intervene and supervise when necessary  - Involve family in performance of ADLs  - Assess for home care needs following discharge   - Consider OT consult to assist with ADL evaluation and planning for discharge  - Provide patient education as appropriate  Outcome: Progressing  Goal: Maintains/Returns to pre admission functional level  Description: INTERVENTIONS:  - Perform AM-PAC 6 Click Basic Mobility/ Daily Activity assessment daily.  - Set and communicate daily mobility  goal to care team and patient/family/caregiver.   - Collaborate with rehabilitation services on mobility goals if consulted  - Perform Range of Motion 2 times a day.  - Reposition patient every 2 hours.  - Record patient progress and toleration of activity level   Outcome: Progressing     Problem: DISCHARGE PLANNING  Goal: Discharge to home or other facility with appropriate resources  Description: INTERVENTIONS:  - Identify barriers to discharge w/patient and caregiver  - Arrange for needed discharge resources and transportation as appropriate  - Identify discharge learning needs (meds, wound care, etc.)  - Arrange for interpretive services to assist at discharge as needed  - Refer to Case Management Department for coordinating discharge planning if the patient needs post-hospital services based on physician/advanced practitioner order or complex needs related to functional status, cognitive ability, or social support system  Outcome: Progressing     Problem: Knowledge Deficit  Goal: Patient/family/caregiver demonstrates understanding of disease process, treatment plan, medications, and discharge instructions  Description: Complete learning assessment and assess knowledge base.  Interventions:  - Provide teaching at level of understanding  - Provide teaching via preferred learning methods  Outcome: Progressing     Problem: CARDIOVASCULAR - ADULT  Goal: Maintains optimal cardiac output and hemodynamic stability  Description: INTERVENTIONS:  - Monitor I/O, vital signs and rhythm  - Monitor for S/S and trends of decreased cardiac output  - Administer and titrate ordered vasoactive medications to optimize hemodynamic stability  - Assess quality of pulses, skin color and temperature  - Assess for signs of decreased coronary artery perfusion  - Instruct patient to report change in severity of symptoms  Outcome: Progressing  Goal: Absence of cardiac dysrhythmias or at baseline rhythm  Description: INTERVENTIONS:  -  Continuous cardiac monitoring, vital signs, obtain 12 lead EKG if ordered  - Administer antiarrhythmic and heart rate control medications as ordered  - Monitor electrolytes and administer replacement therapy as ordered  Outcome: Progressing     Problem: RESPIRATORY - ADULT  Goal: Achieves optimal ventilation and oxygenation  Description: INTERVENTIONS:  - Assess for changes in respiratory status  - Assess for changes in mentation and behavior  - Position to facilitate oxygenation and minimize respiratory effort  - Oxygen administered by appropriate delivery if ordered  - Initiate smoking cessation education as indicated  - Encourage broncho-pulmonary hygiene including cough, deep breathe, Incentive Spirometry  - Assess the need for suctioning and aspirate as needed  - Assess and instruct to report SOB or any respiratory difficulty  - Respiratory Therapy support as indicated  Outcome: Progressing     Problem: METABOLIC, FLUID AND ELECTROLYTES - ADULT  Goal: Fluid balance maintained  Description: INTERVENTIONS:  - Monitor labs   - Monitor I/O and WT  - Instruct patient on fluid and nutrition as appropriate  - Assess for signs & symptoms of volume excess or deficit  Outcome: Progressing     Problem: Nutrition/Hydration-ADULT  Goal: Nutrient/Hydration intake appropriate for improving, restoring or maintaining nutritional needs  Description: Monitor and assess patient's nutrition/hydration status for malnutrition. Collaborate with interdisciplinary team and initiate plan and interventions as ordered.  Monitor patient's weight and dietary intake as ordered or per policy. Utilize nutrition screening tool and intervene as necessary. Determine patient's food preferences and provide high-protein, high-caloric foods as appropriate.     INTERVENTIONS:  - Monitor oral intake, urinary output, labs, and treatment plans  - Assess nutrition and hydration status and recommend course of action  - Evaluate amount of meals eaten  -  Assist patient with eating if necessary   - Allow adequate time for meals  - Recommend/ encourage appropriate diets, oral nutritional supplements, and vitamin/mineral supplements  - Order, calculate, and assess calorie counts as needed  - Recommend, monitor, and adjust tube feedings and TPN/PPN based on assessed needs  - Assess need for intravenous fluids  - Provide specific nutrition/hydration education as appropriate  - Include patient/family/caregiver in decisions related to nutrition  Outcome: Progressing

## 2024-09-02 NOTE — CONSULTS
Formerly [facility]  Consult  Name: Lauren Quintero 78 y.o. female I MRN: [id number]  Unit/Bed#: ICU 02 I Date of Admission: 8/30/2024   Date of Service: 9/2/2024 I Hospital Day: 2    Consults    Assessment & Plan   * Acute on chronic respiratory failure with hypoxia (HCC)  Assessment & Plan  Has chronic respiratory failure  Has had escalating oxygen requirements over the course of the night   Rapid response called on 9/2 for respiratory distress, likely in the setting of aspiration pneumonia  Transferred to icu for BIPAP therapy   Continue ceftriaxone and add flagyl   Will check urine antigens, MRSA, COVID/RSV/influenza PCR and sputum   DNR/DNI, son aware of this and willing to uphold   NPO   Will need speech evaluation when respiratory status improves     Severe sepsis (HCC)  Assessment & Plan  As evidenced by tachycardia, tachypnea, leukocytosis, DARLING, respiratory failure requiring non-invasive mechanical ventilation  Etiology is likely aspiration pneumonia  No lactate production  Procalcitonin 2.84, will trend   Blood cultures pending from 9/1  Will send sputum, MRSA, urine antigens, check COVID/RSV/influenza  Continue ceftriaxone, will add flagyl   The 30ml/kg fluid bolus was not given to the patient despite hypotension and/or significantly elevated lactate of ? 4 and/or presence of septic shock due to: Heart Failure. The patient will be administered 250 ml of crystalloid fluid instead. Orders for this have been placed in [facility]. The patient may receive additional colloid or crystalloid fluids thereafter based on clinical condition.   Follow temperature curve and WBCs     Chronic combined systolic and diastolic CHF (congestive heart failure) (HCC)  Assessment & Plan  Wt Readings from Last 3 Encounters:   09/02/24 51 kg (112 lb 7 oz)   06/14/24 56.1 kg (123 lb 10.9 oz)   06/11/24 54.9 kg (121 lb)     Chronically on entresto  Will continue for now as BP is stable  If BP begins to downtrend, will  discontinue   Echocardiogram from may 2024 with EF of 50%   Appears euvolemic           COPD without exacerbation (HCC)  Assessment & Plan  Will utilize atrovent and xopenex in the setting of respiratory failure and BIPAP  Hold on steroids     Unable to care for self  Assessment & Plan  Patient describes inability to perform her own ADLs   Requesting evaluation for long term placement     Acute on chronic renal failure  (HCC)  Assessment & Plan  Lab Results   Component Value Date    EGFR 16 09/02/2024    EGFR 34 09/01/2024    EGFR 41 08/31/2024    CREATININE 2.67 (H) 09/02/2024    CREATININE 1.45 (H) 09/01/2024    CREATININE 1.26 08/31/2024   Baseline creatinine appears to be 1.2  Now doubled to 2.4  Likely secondary to severe sepsis  Will give small fluid bolus   Monitor scrt  Avoid nephrotoxic agents     Major depressive disorder, recurrent episode, moderate (HCC)  Assessment & Plan  Continue home dose medications when able to take oral     Essential hypertension  Assessment & Plan  Will continue home dose antihypertensives as BP is currently stable  If begins to downtrend, will discontinue     Ambulatory dysfunction  Assessment & Plan  Will need PT/OT evaluation  Patient was requesting evaluation for long term placement as she cannot complete her ADLs herself at this point            History of Present Illness     HPI: Lauren Quintero is a 78 y.o. who presents  who has a past medical history of tobacco abuse, chronic respiratory failure, CAD, generalized weakness, ambulatory dysfunction, heart failure, CKD, depression.  She is admitted for evaluation of ambulatory dysfunction.  Rapid response called for hypoxia and respiratory distress.  Upon evaluation, patient is in visible respiratory distress with oxygen saturations in the 70's on 100% NRB.  Chest xray noting hilar opacification.  Will transport to ICU for BIPAP therapy     History obtained from chart review.  Review of Systems: Review of Systems  Disposition:  Stepdown Level 1   Historical Information   Past Medical History:  08/27/2021: Acute congestive heart failure (HCC)  No date: Anxiety  No date: Cardiac disease  08/27/2021: Chest pain  No date: Chronic pain disorder  02/21/2024: CKD (chronic kidney disease)  No date: COPD (chronic obstructive pulmonary disease) ([facility])  02/15/2024: COVID-19  No date: Depression  No date: Heart disease  No date: Hyperlipidemia  No date: Hypertension  No date: MI (myocardial infarction) ([facility])  No date: MRSA (methicillin resistant Staphylococcus aureus)  No date: Renal disorder      Comment:  benign kidney tumor Past Surgical History:  No date: APPENDECTOMY  2/21/2024: CARDIAC CATHETERIZATION; N/A      Comment:  Procedure: Cardiac catheterization;  Surgeon: Luke Malagon MD;  Location: [facility];  Service:                Cardiology  2/21/2024: CARDIAC CATHETERIZATION; N/A      Comment:  Procedure: Cardiac Coronary Angiogram;  Surgeon: Luke Malagon MD;  Location: [facility];  Service:               Cardiology  2/21/2024: CARDIAC CATHETERIZATION; Left      Comment:  Procedure: Cardiac Left Heart Cath;  Surgeon: Luke Malagon MD;  Location: [facility];  Service:                Cardiology  02/2018: ELBOW BURSA SURGERY; Left      Comment:  D/T MRSA INFECTION  2/27/2024: IR THORACENTESIS  No date: SPINAL FUSION      Comment:  L7 L9   Current Outpatient Medications   Medication Instructions    aspirin (ECOTRIN LOW STRENGTH) 81 mg, Oral, Daily    buPROPion (WELLBUTRIN) 75 mg, Oral, Daily    Calcium 500 mg, Oral, 2 times daily    cariprazine (VRAYLAR) 1.5 mg, Oral, Daily    gabapentin (NEURONTIN) 100 mg, Oral, Daily at bedtime    ipratropium (ATROVENT) 0.5 mg, Nebulization, 3 times daily (RESP)    metoprolol succinate (TOPROL-XL) 100 mg, Oral, Daily    nitroglycerin (NITROSTAT) 0.4 mg, Sublingual, Every 5 minutes PRN    pantoprazole (PROTONIX) 40 mg, Oral,  Daily    pravastatin (PRAVACHOL) 80 mg, Oral, Every evening    sacubitril-valsartan (Entresto) 24-26 MG TABS 1 tablet, Oral, 2 times daily    umeclidinium-vilanterol (Anoro Ellipta) 62.5-25 mcg/actuation inhaler 1 puff, Inhalation, Daily    vilazodone (VIIBRYD) 40 mg, Oral, Daily with breakfast    Zanubrutinib 160 mg, 2 times daily    Allergies   Allergen Reactions    Sulfa Antibiotics Anaphylaxis    Iron GI Intolerance    Niacin     Statins Other (See Comments)     cramps      Social History     Tobacco Use    Smoking status: Former     Current packs/day: 0.00     Average packs/day: 1 pack/day for 60.0 years (60.0 ttl pk-yrs)     Types: Cigarettes     Start date:      Quit date:      Years since quittin.6    Smokeless tobacco: Never    Tobacco comments:     She has close to a 60 pack-year smoking history, most recently has cut down to 4-5 cigarettes daily   Vaping Use    Vaping status: Never Used   Substance Use Topics    Alcohol use: Never    Drug use: No    Family History   Problem Relation Age of Onset    Heart disease Mother     Cancer Father         Objective                            Vitals I/O      Most Recent Min/Max in 24hrs   Temp 97.9 °F (36.6 °C) Temp  Min: 97.9 °F (36.6 °C)  Max: 98.4 °F (36.9 °C)   Pulse (!) 145 Pulse  Min: 104  Max: 145   Resp (!) 24 Resp  Min: 16  Max: 46   /74 BP  Min: 99/62  Max: 177/99   O2 Sat 94 % SpO2  Min: 71 %  Max: 95 %    No intake or output data in the 24 hours ending 24 1150    Diet NPO    Invasive Monitoring           Physical Exam   Physical Exam  Eyes:      General: Lids are normal.      Extraocular Movements: Extraocular movements intact.      Conjunctiva/sclera: Conjunctivae normal.   Skin:     General: Skin is warm and dry.   HENT:      Head: Normocephalic and atraumatic.   Neck:      Trachea: Trachea normal.   Cardiovascular:      Rate and Rhythm: Tachycardia present.      Pulses: Normal pulses.   Musculoskeletal:      Cervical back:  Normal range of motion.      Right lower leg: No edema.      Left lower leg: No edema.   Abdominal: General: Abdomen is flat.      Palpations: Abdomen is soft.      Tenderness: There is no abdominal tenderness.   Constitutional:       General: She is awake. She is in acute distress.      Appearance: She is cachectic. She is ill-appearing.      Comments: On bipap     Pulmonary:      Breath sounds: Rhonchi present.   Psychiatric:         Behavior: Behavior is cooperative.   Neurological:      General: No focal deficit present.      Mental Status: She is alert.      GCS: GCS eye subscore is 4. GCS verbal subscore is 5. GCS motor subscore is 6.      Sensory: Sensation is intact.      Coordination: Coordination is intact.            Diagnostic Studies      EKG: ST   Imaging:   XR chest 1 view portable   Final Result      No acute cardiopulmonary disease.            Workstation performed: [id number]         XR chest portable    (Results Pending)   XR chest portable ICU    (Results Pending)         Medications:  Scheduled PRN   aspirin, 81 mg, Daily  buPROPion, 75 mg, Daily  cefTRIAXone, 2,000 mg, Q24H  gabapentin, 100 mg, HS  guaiFENesin, 1,200 mg, Q12H ROSELINE  heparin (porcine), 5,000 Units, Q8H ROSELINE  hydrALAZINE, 10 mg, Once  ipratropium, 0.5 mg, Q6H  levalbuterol, 1.25 mg, Q6H  magnesium sulfate, 2 g, Once  melatonin, 3 mg, HS  metoprolol succinate, 100 mg, Daily  metroNIDAZOLE, 500 mg, Q8H  multi-electrolyte, 250 mL, Once  pantoprazole, 40 mg, Daily  pravastatin, 80 mg, QPM  sacubitril-valsartan, 1 tablet, BID  vilazodone, 40 mg, Daily With Breakfast      acetaminophen, 650 mg, Q6H PRN  aluminum-magnesium hydroxide-simethicone, 30 mL, Q6H PRN  hydrOXYzine HCL, 25 mg, Q6H PRN  ondansetron, 4 mg, Q6H PRN  polyethylene glycol, 17 g, Daily PRN       Continuous          Labs:    CBC    Recent Labs     09/01/24  0621 09/02/24  0519 09/02/24  0834   WBC 11.37* 13.70*  --    HGB 12.0 11.6 12.9   HCT 36.5 36.8 38    169  --     BANDSPCT  --  19*  --      BMP    Recent Labs     09/01/24  0621 09/02/24  0519 09/02/24  0834   SODIUM 138 140  --    K 4.3 4.4  --    CL 99 99  --    CO2 32 31 32   AGAP 7 10  --    BUN 24 42*  --    CREATININE 1.45* 2.67*  --    CALCIUM 9.7 9.3  --        Coags    No recent results     Additional Electrolytes  Recent Labs     09/01/24 0621 09/02/24  0519 09/02/24  0834   MG 1.6* 1.6*  --    CAIONIZED  --   --  1.18          Blood Gas    No recent results  No recent results LFTs  Recent Labs     09/02/24  0519   ALT <3*   AST 8*   ALKPHOS 77   ALB 3.7   TBILI 0.78       Infectious  Recent Labs     09/01/24  0621 09/02/24  0519   PROCALCITONI 0.08 2.84*     Glucose  Recent Labs     09/01/24  0621 09/02/24  0519   GLUC 113 115               TAMMIE Thorpe

## 2024-09-02 NOTE — RESPIRATORY THERAPY NOTE
RT Protocol Note  Lauren Quintero 78 y.o. female MRN: [id number]  Unit/Bed#: ICU 02 Encounter: [id number]    Assessment    Principal Problem:    Acute on chronic respiratory failure with hypoxia (HCC)  Active Problems:    Ambulatory dysfunction    Essential hypertension    Major depressive disorder, recurrent episode, moderate (HCC)    COPD without exacerbation (HCC)    Chronic combined systolic and diastolic CHF (congestive heart failure) ([facility])    Acute on chronic renal failure  (HCC)    Unable to care for self    Severe sepsis (HCC)      Home Pulmonary Medications:    Home Devices/Therapy: Home O2    Past Medical History:   Diagnosis Date    Acute congestive heart failure (HCC) 2021    Anxiety     Cardiac disease     Chest pain 2021    Chronic pain disorder     CKD (chronic kidney disease) 2024    COPD (chronic obstructive pulmonary disease) ([facility])     COVID-19 02/15/2024    Depression     Heart disease     Hyperlipidemia     Hypertension     MI (myocardial infarction) ([facility])     MRSA (methicillin resistant Staphylococcus aureus)     Renal disorder     benign kidney tumor     Social History     Socioeconomic History    Marital status:      Spouse name: Not on file    Number of children: 3    Years of education: 13    Highest education level: High school graduate   Occupational History    Occupation: [employer]     Employer: [employer]     Comment: retired    Tobacco Use    Smoking status: Former     Current packs/day: 0.00     Average packs/day: 1 pack/day for 60.0 years (60.0 ttl pk-yrs)     Types: Cigarettes     Start date:      Quit date: 2016     Years since quittin.6    Smokeless tobacco: Never    Tobacco comments:     She has close to a 60 pack-year smoking history, most recently has cut down to 4-5 cigarettes daily   Vaping Use    Vaping status: Never Used   Substance and Sexual Activity    Alcohol use: Never    Drug use: No    Sexual activity: Not Currently    Other Topics Concern    Not on file   Social History Narrative    Not on file     Social Determinants of Health     Financial Resource Strain: Patient Declined (1/6/2024)    Received from [email or web address] [facility]    Overall Financial Resource Strain (CARDIA)     Difficulty of Paying Living Expenses: Patient declined   Food Insecurity: No Food Insecurity (8/31/2024)    Hunger Vital Sign     Worried About Running Out of Food in the Last Year: Never true     Ran Out of Food in the Last Year: Never true   Transportation Needs: No Transportation Needs (8/31/2024)    PRAPARE - Transportation     Lack of Transportation (Medical): No     Lack of Transportation (Non-Medical): No   Physical Activity: Patient Declined (1/6/2024)    Received from [email or web address] [facility]    Exercise Vital Sign     Days of Exercise per Week: Patient declined     Minutes of Exercise per Session: Patient declined   Stress: Patient Declined (1/6/2024)    Received from [facility] [employer] [facility] [employer] [facility] - Occupational Stress Questionnaire     Feeling of Stress : Patient declined   Social Connections: Unknown (6/18/2024)    Received from [employer]    Social Connections     How often do you feel lonely or isolated from those around you? (Adult - for ages 18 years and over): Not on file   Intimate Partner Violence: Patient Declined (1/6/2024)    Received from [email or web address] [facility]    Humiliation, Afraid, Rape, and Kick questionnaire     Fear of Current or Ex-Partner: Patient declined     Emotionally Abused: Patient declined     Physically Abused: Patient declined     Sexually Abused: Patient declined   Housing Stability: Low Risk  (8/31/2024)    Housing Stability Vital Sign     Unable to Pay for Housing in the Last Year: No     Number of Times Moved in the Last Year: 1     Homeless in the Last Year: No       Subjective    Subjective Data: awake/alert    Objective    Physical  "Exam:   Assessment Type: Assess only  General Appearance: Awake  Respiratory Pattern: Assisted  Chest Assessment: Chest expansion symmetrical  Bilateral Breath Sounds: Diminished, Rhonchi, Coarse  O2 Device: v60    Vitals:  Blood pressure 122/64, pulse (!) 144, temperature 97.9 °F (36.6 °C), temperature source Axillary, resp. rate 22, height 5' 7\" (1.702 m), weight 51 kg (112 lb 7 oz), SpO2 97%, not currently breastfeeding.          Imaging and other studies: I have personally reviewed pertinent reports.      O2 Device: v60     Plan    Respiratory Plan: Home Bronchodilator Patient pathway  Airway Clearance Plan: Flutter     Resp Comments: pt with hx of copd will continue with Q6 xop/atr   "

## 2024-09-02 NOTE — ASSESSMENT & PLAN NOTE
Will need PT/OT evaluation  Patient was requesting evaluation for long term placement as she cannot complete her ADLs herself at this point

## 2024-09-02 NOTE — NURSING NOTE
Upon RN assessment Pt with HR in 120's-130's, increased work of breathing,  O2 sats 82-85% on 6Lnc meeting sepsis criteria. Notified Dr. Ritchie. New orders for IV abx and holding on fluids r/t CHF. Notified RT for increased O2. RT placed pt on midflow 15L. Pt maintained sats around 88%, but shortly after decreased to 71% now with complaints of SOB. RRT called at 0822 and pt transferred to ICU.

## 2024-09-02 NOTE — RAPID RESPONSE
Rapid Response Note  Lauren Quintero 78 y.o. female MRN: [id number]  Unit/Bed#: ICU 02 Encounter: [id number]    Rapid Response Notification(s):   Response called date/time:  9/2/2024 8:24 AM  Response team arrival date/time:  9/2/2024 8:26 AM  Response end date/time:  9/2/2024 9:00 AM  [facility]  [facility]  Primary reason for rapid response call:  Acute change in RR and acute change in O2 sat    Rapid Response Intervention(s):   Breathing:  Oxygen  Circulation:  Electrocardiogram  Fluids administered:  None       Assessment:   Acute on chronic hypoxic respiratory likely secondary to aspiration pneumonia   Severe sepsis       Plan:   Will transfer to icu for highflow/BIPAP therapy   Continue ceftriaxone and add flagyl   NPO   DNR/DNI      Rapid Response Outcome:   Transfer:  Transfer to stepdown 1  Primary service notified of transfer: Yes    Code Status: Level 3 (DNAR and DNI)      Family notified: Yes, Name of Family member contacted Son Imer           Background/Situation:   Lauren Quintero is a 78 y.o. female who has a past medical history of tobacco abuse, chronic respiratory failure, CAD, generalized weakness, ambulatory dysfunction, heart failure, CKD, depression.  She is admitted for evaluation of ambulatory dysfunction.  Rapid response called for hypoxia and respiratory distress.  Upon evaluation, patient is in visible respiratory distress with oxygen saturations in the 70's on 100% NRB.  Chest xray noting hilar opacification.  Will transport to ICU for BIPAP therapy.      Review of Systems   Unable to perform ROS: Acuity of condition       Objective:   Vitals:    09/02/24 0908 09/02/24 1000 09/02/24 1003 09/02/24 1100   BP:   112/74    BP Location:   Right arm    Pulse:  (!) 145     Resp:  (!) 28 (!) 24    Temp:    97.9 °F (36.6 °C)   TempSrc:    Axillary   SpO2: (!) 85% 94%     Weight:       Height:         Physical Exam  Constitutional:       General: She is awake.       Appearance: She is cachectic. She is ill-appearing.      Comments: 100% NRB + NC    HENT:      Head: Normocephalic and atraumatic.   Eyes:      General: Lids are normal.      Extraocular Movements: Extraocular movements intact.      Conjunctiva/sclera: Conjunctivae normal.   Neck:      Trachea: Trachea normal.   Cardiovascular:      Rate and Rhythm: Tachycardia present.      Pulses: Normal pulses.   Pulmonary:      Breath sounds: Rhonchi present.   Abdominal:      General: Abdomen is flat.      Palpations: Abdomen is soft.      Tenderness: There is no abdominal tenderness.   Musculoskeletal:      Cervical back: Normal range of motion.      Right lower leg: No edema.      Left lower leg: No edema.   Skin:     General: Skin is warm and dry.   Neurological:      General: No focal deficit present.      Mental Status: She is alert.      GCS: GCS eye subscore is 4. GCS verbal subscore is 5. GCS motor subscore is 6.      Sensory: Sensation is intact.      Motor: Motor function is intact.   Psychiatric:         Mood and Affect: Mood is anxious.         Behavior: Behavior is cooperative.

## 2024-09-02 NOTE — RESPIRATORY THERAPY NOTE
RT Ventilator Management Note  Lauren Quintero 78 y.o. female MRN: [id number]  Unit/Bed#: -01 Encounter: [id number]      Daily Screen    No data found in the last 10 encounters.           Physical Exam:   Assessment Type: Assess only  General Appearance: Awake, Alert  Respiratory Pattern: Assisted  Chest Assessment: Chest expansion symmetrical  Bilateral Breath Sounds: Diminished, Rhonchi, Coarse  O2 Device: HFNC  Subjective Data: awake/alert      Resp Comments: pt brought to ICU following RR due to possible aspiration and placed on HFNC

## 2024-09-02 NOTE — RESPIRATORY THERAPY NOTE
RT Ventilator Management Note  Lauren Quintero 78 y.o. female MRN: [id number]  Unit/Bed#: ICU 02 Encounter: [id number]      Daily Screen    No data found in the last 10 encounters.           Physical Exam:   Assessment Type: Assess only  General Appearance: Sleeping  Respiratory Pattern: Assisted, Spontaneous  Chest Assessment: Chest expansion symmetrical  Bilateral Breath Sounds: Diminished, Rhonchi, Coarse  O2 Device: v60  Subjective Data: sleeping      Resp Comments: increaed fio2 back to 100%       09/02/24 1411   Respiratory Assessment   Assessment Type Assess only   General Appearance Sleeping   Respiratory Pattern Assisted;Spontaneous   Chest Assessment Chest expansion symmetrical   Bilateral Breath Sounds Diminished;Rhonchi;Coarse   Resp Comments increaed fio2 back to 100%   O2 Device v60   Subjective Data sleeping   Non-Invasive Information   O2 Interface Device Face mask   Non-Invasive Ventilation Mode BiPAP   SpO2 91 %   $ Pulse Oximetry Spot Check Charge Completed   Non-Invasive Settings   FiO2 (%) (S)  100   Temperature (Set) 31   IPAP (cm) 14 cm   EPAP (cm) 8 cm   Rate (Set) 8   Pressure Support (cm H2O) 6   Rise Time 2   Inspiratory Time (Set) 1   Non-Invasive Readings   Skin Intervention Skin intact   Heater Temperature (Obs) 31.1   Total Rate 24   Vt (mL) (Mech) 479   MV (Mech) 11.2   Peak Pressure (Obs) 15   Spontaneous Vt (mL) 394   Leak (lpm) 0   Non-Invasive Alarms   Insp Pressure High (cm H20) 25   Insp Pressure Low (cm H20) 5   MV Low (L/min) 3   Vt High (mL) 1200   Vt Low (mL) 150   High Resp Rate (BPM) 45 BPM   Low Resp Rate (BPM) 8 BPM   Maintenance   Water bag changed No

## 2024-09-02 NOTE — SEPSIS NOTE
"  Sepsis Note   Lauren Quintero 78 y.o. female MRN: [id number]  Unit/Bed#: ICU 02 Encounter: [id number]       Initial Sepsis Screening       Row Name 09/02/24 0949                Is the patient's history suggestive of a new or worsening infection? Yes (Proceed)  -KS        Suspected source of infection pneumonia  -KS        Indicate SIRS criteria Leukocytosis (WBC > 56728 IJL) OR Leukopenia (WBC <4000 IJL) OR Bandemia (WBC >10% bands);Tachycardia > 90 bpm;Tachypnea > 20 resp per min  -KS        Are two or more of the above signs & symptoms of infection both present and new to the patient? Yes (Proceed)  -KS        Assess for evidence of organ dysfunction: Are any of the below criteria present within 6 hours of suspected infection and SIRS criteria that are NOT considered to be chronic conditions? Creatinine > 2.0;New need for invasive/non-invasive ventilation  -KS        Date of presentation of severe sepsis 09/02/24  -KS        Time of presentation of severe sepsis 0949  -KS        Sepsis Note: Click \"NEXT\" below (NOT \"close\") to generate sepsis note based on above information. YES (proceed by clicking \"NEXT\")  -KS                  User Key  (r) = Recorded By, (t) = Taken By, (c) = Cosigned By      Initials Name Provider Type    TAMMIE Ortega Nurse Practitioner                        Body mass index is 19.09 kg/m².  Wt Readings from Last 1 Encounters:   08/30/24 55.3 kg (121 lb 14.6 oz)        Ideal body weight: 61.6 kg (135 lb 12.9 oz)    "

## 2024-09-02 NOTE — RESPIRATORY THERAPY NOTE
RT Ventilator Management Note  Lauren Quintero 78 y.o. female MRN: [id number]  Unit/Bed#: ICU 02 Encounter: [id number]      Daily Screen    No data found in the last 10 encounters.           Physical Exam:   Assessment Type: Assess only  General Appearance: Awake  Respiratory Pattern: Assisted  Chest Assessment: Chest expansion symmetrical  Bilateral Breath Sounds: Diminished, Rhonchi, Coarse  O2 Device: v60  Subjective Data: awake/alert      Resp Comments: transitioned pt to bipap       09/02/24 0908   Respiratory Assessment   Assessment Type Assess only   General Appearance Awake   Respiratory Pattern Assisted   Chest Assessment Chest expansion symmetrical   Bilateral Breath Sounds Diminished;Rhonchi;Coarse   Resp Comments transitioned pt to bipap   O2 Device v60   Subjective Data awake/alert   Non-Invasive Information   O2 Interface Device Face mask   Non-Invasive Ventilation Mode BiPAP   $ Continous NIV Initial   SpO2 (!) 85 %   $ Pulse Oximetry Spot Check Charge Completed   Non-Invasive Settings   FiO2 (%) 100   Temperature (Set) 31   IPAP (cm) 18 cm   EPAP (cm) 12 cm   Rate (Set) 8   Pressure Support (cm H2O) 6   Rise Time 2   Inspiratory Time (Set) 1   Non-Invasive Readings   Skin Intervention Skin intact   Heater Temperature (Obs) 31   Total Rate 29   Vt (mL) (Mech) 415   MAP (Obs) 19   MV (Mech) 12.1   Leak (lpm) 17   Non-Invasive Alarms   Insp Pressure High (cm H20) 25   Insp Pressure Low (cm H20) 5   MV Low (L/min) 3   Vt High (mL) 1200   Vt Low (mL) 150   High Resp Rate (BPM) 45 BPM   Low Resp Rate (BPM) 8 BPM

## 2024-09-02 NOTE — ASSESSMENT & PLAN NOTE
Has chronic respiratory failure  Has had escalating oxygen requirements over the course of the night   Rapid response called on 9/2 for respiratory distress, likely in the setting of aspiration pneumonia  Transferred to icu for BIPAP therapy   Continue ceftriaxone and add flagyl   Will check urine antigens, MRSA, COVID/RSV/influenza PCR and sputum   DNR/DNI, son aware of this and willing to uphold   NPO   Will need speech evaluation when respiratory status improves

## 2024-09-02 NOTE — ACP (ADVANCE CARE PLANNING)
Critical Care Advanced Care Planning Note  Lauren Quintero 78 y.o. female MRN: [id number]  Unit/Bed#: ICU 02 Encounter: [id number]    Lauren Quintero is a 78 y.o. female requiring critical care evaluation and advanced care planning. The patient has chronic comorbidities, including but not limited to tobacco abuse, chronic respiratory failure, CAD, generalized weakness, ambulatory dysfunction, heart failure, CKD, depression which is now further complicated by the following acute conditions: acute respiratory failure secondary to aspiration.  Due to the severity of the patient's acute condition and/or the extent of chronic conditions, additional conversations pertaining to advanced care planning were required. Today's discussion, which was held in a face-to-face meeting, included  patient's son, Imer , and it was established that all stake holders understood the rationale for the advanced care planning. The patient was unable to participate in the discussion due to encephalopathy and BIPAP .    Summary of Discussion:  We discussed Lauren's current clinical state.  She had a rapid response this morning for respiratory distress, ultimately requiring continuous BIPAP therapy.  She has had fluctuating oxygen saturations while on the BIPAP.  She currently is requiring maximum BIPAP support.   She remains encephalopathic only responsive to her name and simple yes or no questions.  We discussed the trajectory of her care through the night.  I expressed concern that Lauren may have worsening respiratory status beyond the treatment capabilities of BIPAP.  In an aggressive treatment focused path, this would require intubation and mechanical ventilation.  However, Lauren had expressed the desire to be a DNR/DNI on presentation to the hospital.  Collectively, we all agree that we should uphold those wishes despite her inability to reconfirm them now.  That being said, I explained what a comfort focused path would look like for  "Lauren should she decompensate further throughout the night.  Her son and his significant other believe that comfort measures would be the best course of action should she decompensate further.  Imer expressed that Lauren has had multiple hospital admissions for aspiration and sepsis and has been \"suffering\" for some time now.  Given that she is fairly stable and comfortable at the time of this conversation, we agreed to continued treatment focused care through the night with a plan to re-evaluate her clinical status in the morning.  All of their questions were answered to their satisfaction.          Total time spent, (33) minutes (1650 to 1723).      CODE STATUS: DNR/DNI - Level 3  POA:    POLST:      SIGNATURE: TAMMIE Thorpe  DATE: September 2, 2024  TIME: 5:23 PM    "

## 2024-09-02 NOTE — ASSESSMENT & PLAN NOTE
Wt Readings from Last 3 Encounters:   09/02/24 51 kg (112 lb 7 oz)   06/14/24 56.1 kg (123 lb 10.9 oz)   06/11/24 54.9 kg (121 lb)     Chronically on entresto  Will continue for now as BP is stable  If BP begins to downtrend, will discontinue   Echocardiogram from may 2024 with EF of 50%   Appears euvolemic

## 2024-09-02 NOTE — ASSESSMENT & PLAN NOTE
Lab Results   Component Value Date    EGFR 16 09/02/2024    EGFR 34 09/01/2024    EGFR 41 08/31/2024    CREATININE 2.67 (H) 09/02/2024    CREATININE 1.45 (H) 09/01/2024    CREATININE 1.26 08/31/2024   Baseline creatinine appears to be 1.2  Now doubled to 2.4  Likely secondary to severe sepsis  Will give small fluid bolus   Monitor scrt  Avoid nephrotoxic agents

## 2024-09-02 NOTE — ASSESSMENT & PLAN NOTE
Will utilize atrovent and xopenex in the setting of respiratory failure and BIPAP  Hold on steroids

## 2024-09-02 NOTE — QUICK NOTE
Rapid response this a.m. due to hypoxia, likely thought to aspiration pneumonia as evident on x-ray from 9/1 concerning for possible hilar pneumonia  Discussed with crit care and plan to take over care with transfer of patient to ICU    Multiple attempts to reach family but unsuccessful  Left voicemail for son Imer at [phone] and Daughter-in-law Lisa at [phone]; will re-attempt later in the day to provide update.

## 2024-09-02 NOTE — ASSESSMENT & PLAN NOTE
Will continue home dose antihypertensives as BP is currently stable  If begins to downtrend, will discontinue

## 2024-09-02 NOTE — QUICK NOTE
Attempted to call both patient's son Imer and daughter-in-law to given clinical update and alert them of transfer to ICU secondary to clinical deterioration.  I left a message to return a phone call to the ICU.  Will continue to attempt contact.     Purse String (Intermediate) Text: Given the location of the defect and the characteristics of the surrounding skin a purse string intermediate closure was deemed most appropriate.  Undermining was performed circumfirentially around the surgical defect.  A purse string suture was then placed and tightened.

## 2024-09-02 NOTE — ASSESSMENT & PLAN NOTE
As evidenced by tachycardia, tachypnea, leukocytosis, DARLING, respiratory failure requiring non-invasive mechanical ventilation  Etiology is likely aspiration pneumonia  No lactate production  Procalcitonin 2.84, will trend   Blood cultures pending from 9/1  Will send sputum, MRSA, urine antigens, check COVID/RSV/influenza  Continue ceftriaxone, will add flagyl   The 30ml/kg fluid bolus was not given to the patient despite hypotension and/or significantly elevated lactate of ? 4 and/or presence of septic shock due to: Heart Failure. The patient will be administered 250 ml of crystalloid fluid instead. Orders for this have been placed in [facility]. The patient may receive additional colloid or crystalloid fluids thereafter based on clinical condition.   Follow temperature curve and WBCs    [Time Spent: ___ minutes] : I have spent [unfilled] minutes of time on the encounter.

## 2024-09-03 ENCOUNTER — PATIENT OUTREACH (OUTPATIENT)
Dept: CASE MANAGEMENT | Facility: OTHER | Age: 79
End: 2024-09-03

## 2024-09-03 LAB
L PNEUMO1 AG UR QL IA.RAPID: NEGATIVE
S PNEUM AG UR QL: NEGATIVE

## 2024-09-03 NOTE — DISCHARGE SUMMARY
Discharge Summary   Lauren Quintero 78 y.o. female MRN: [id number]    [facility] ICU Room / Bed: ICU 02/ICU 02 Encounter: [id number]      Admitting Provider: Jose Shelton DO  Discharge Provider: Osiris Brantley DO  Admission Date: 8/30/2024     Discharge Date: No discharge date for patient encounter.  Primary Care Physician at Discharge: Starla Bateman [phone]    Primary Discharge Diagnosis  Principal Problem:    Acute on chronic respiratory failure with hypoxia (HCC)  Active Problems:    Chronic combined systolic and diastolic CHF (congestive heart failure) ([facility])    Severe sepsis (HCC)    COPD without exacerbation ([facility])    Ambulatory dysfunction    Essential hypertension    Major depressive disorder, recurrent episode, moderate (HCC)    Acute on chronic renal failure  ([facility])    Unable to care for self  Resolved Problems:    Major depressive disorder, recurrent, unspecified (HCC)      Other Problems Addressed  Patient Active Problem List    Diagnosis Date Noted    Acute on chronic respiratory failure with hypoxia ([facility]) 12/19/2018    Severe sepsis ([facility]) 09/02/2024    Chronic combined systolic and diastolic CHF (congestive heart failure) ([facility]) 03/13/2024    Coronary artery disease of native artery of native heart with stable angina pectoris ([facility]) 12/21/2018    COPD without exacerbation ([facility]) 08/27/2021    DARLING (acute kidney injury) ([facility]) 05/02/2024    Closed fracture of bones of trunk 08/30/2024    Diverticulosis of large intestine without perforation or abscess without bleeding 08/30/2024    Encounter for general adult medical examination with abnormal findings 08/30/2024    First degree hemorrhoids 08/30/2024    Folliculitis ulerythematosa reticulata 08/30/2024    History of colonic polyps 08/30/2024    Obstructive sleep apnea syndrome 08/30/2024    Osteoporosis 08/30/2024    Other bursitis, not elsewhere classified, left elbow 08/30/2024    Panic disorder 08/30/2024     Pure hypercholesterolemia 08/30/2024    Thoracic radiculopathy 08/30/2024    Tobacco abuse counseling 08/30/2024    Vitamin deficiency 08/30/2024    Acute bronchitis 08/30/2024    Chronic sinusitis 08/30/2024    Hypersomnia 08/30/2024    Tobacco use 08/30/2024    Unable to care for self 08/30/2024    Acute on chronic renal failure  (HCC) 06/27/2024    Acute kidney injury superimposed on chronic kidney disease  (HCC) 05/04/2024    Diarrhea 04/17/2024    Pain 04/15/2024    Loose stools 04/01/2024    Insomnia 04/01/2024    Non-ST elevation MI (NSTEMI) ([facility]) 04/01/2024    Abnormal chest x-ray 03/20/2024    Elevated troponin 03/01/2024    Generalized muscle weakness 01/30/2024    Hyperlipidemia, unspecified 01/30/2024    Non-Hodgkin lymphoma, unspecified, unspecified site ([facility]) 01/30/2024    Oxygen dependent 01/30/2024    Anxiety disorder 01/30/2024    Unspecified abnormalities of gait and mobility 01/30/2024    Acute and chronic respiratory failure, unspecified whether with hypoxia or hypercapnia ([facility]) 01/30/2024    Atherosclerotic heart disease of native coronary artery without angina pectoris 01/30/2024    Unspecified protein-calorie malnutrition ([facility]) 01/30/2024    JOHN (iron deficiency anemia) 01/12/2024    On home O2 01/06/2024    MDD (major depressive disorder), recurrent episode, severe ([facility]) 01/05/2024    Dizziness 12/14/2023    Social problem 12/13/2023    Diverticulitis 10/29/2023    Moderate protein-calorie malnutrition ([facility]) 05/02/2023    BRBPR (bright red blood per rectum) 05/01/2023    Generalized weakness 03/30/2023    Staring episodes 03/30/2023    Waldenstrom macroglobulinemia ([facility]) 03/06/2023    Agitation 12/20/2022    Influenza A 12/19/2022    Hypomagnesemia 12/11/2022    Lymphoma ([facility]) 12/10/2022    Right lower lobe pneumonia 12/10/2022    Hypokalemia 12/10/2022    Sepsis ([facility]) 12/10/2022    Severe malnutrition ([facility]) 06/15/2022    Acute respiratory failure with hypoxia and hypercapnia (HCC) 06/14/2022  "   Altered mental status 06/14/2022    Hypertensive emergency 06/14/2022    Severe protein-calorie malnutrition (HCC) 08/28/2021    Positive blood culture 08/28/2021    Anemia 08/27/2021    Abnormal computed tomography angiography (CTA) 08/27/2021    Fatigue 03/30/2021    Irritable bowel syndrome 09/21/2020    Acute exacerbation of chronic obstructive pulmonary disease (COPD) (HCC) 09/21/2020    Depression 09/20/2020    History of coronary artery disease 09/20/2020    Leukocytosis 09/20/2020    Mixed hyperlipidemia 09/20/2020    Major depressive disorder, recurrent episode, moderate (HCC) 12/30/2019    Flank pain 04/12/2019    Ambulatory dysfunction 12/16/2018    Essential hypertension 12/16/2018    Tobacco abuse 12/16/2018    Need for prophylactic hormone replacement therapy (postmenopausal) 06/01/2000    Sciatica 06/01/2000    Plantar fibromatosis 01/06/2000       Consulting Providers   Critical care    Diagnostic Procedures Performed  XR chest portable   Final Result      Medial right base atelectasis and/or infiltrate.      Trace right pleural effusion.            Workstation performed: [id number]         XR chest 1 view portable   Final Result      No acute cardiopulmonary disease.            Workstation performed: [id number]         XR chest portable ICU    (Results Pending)       Presenting Problem/History of Present Illness  Acute on chronic respiratory failure with hypoxia (HCC)    Hospital Course  HPI per TAMMIE Thorpe:  \"Lauren Quintero is a 78 y.o. who presents  who has a past medical history of tobacco abuse, chronic respiratory failure, CAD, generalized weakness, ambulatory dysfunction, heart failure, CKD, depression.  She is admitted for evaluation of ambulatory dysfunction.  Rapid response called for hypoxia and respiratory distress.  Upon evaluation, patient is in visible respiratory distress with oxygen saturations in the 70's on 100% NRB.  Chest xray noting hilar opacification.  Will transport " "to ICU for BIPAP therapy.\"  Despite being on bipap, the patient continued to decline. Multiple conversations were had with patient's son Imer regarding goals of care. The patient became hypotensive and Imer was updated again regarding progression to septic shock. Imer did not wish to prolong the patient's suffering and returned back to the hospital. Once he was at bedside, the patient was transitioned to comfort care. She  on 24 at 2153. Our condolences were offered to the family.     Discharge Disposition: Home/Self Care    Allergies  Allergies   Allergen Reactions    Sulfa Antibiotics Anaphylaxis    Iron GI Intolerance    Niacin     Statins Other (See Comments)     cramps     Discharge Condition:        Code Status: Level 4 - Comfort Care  Advance Directive and Living Will: <no information>  Power of :    POLST:      Discharge  Statement   I spent 15 minutes discharging the patient. This time was spent on the day of discharge. I had direct contact with the patient on the day of discharge. Additional documentation is required if more than 30 minutes were spent on discharge.           "

## 2024-09-03 NOTE — QUICK NOTE
Updated son Imer and daughter in law Lisa at bedside. Dr. Brantley had a previous telephone conversation with them regarding Lauren's condition and ongoing decompensation. They would like to pursue comfort focused care at this time. All questions and concerns addressed. Will change patient to level 4 comfort care.

## 2024-09-03 NOTE — DEATH NOTE
INPATIENT DEATH NOTE  Lauren Quintero 78 y.o. female MRN: [id number]  Unit/Bed#: ICU 02 Encounter: [id number]    Date, Time and Cause of Death    Date of Death: 24  Time of Death:  9:53 PM  Preliminary Cause of Death: Acute respiratory failure with hypoxia (HCC)  Entered by: TAMMIE Morel[KD1.1]       Attribution       KD1.1 TAMMIE Morel 24 21:59             Patient's Information  Pronounced by: TAMMIE Morel  Did the patient's death occur in the ED?: No  Did the patient's death occur in the OR?: No  Did the patient's death occur less than 10 days post-op?: No  Did the patient's death occur within 24 hours of admission?: No  Was code status DNR at the time of death?: Yes    PHYSICAL EXAM:  Unresponsive to noxious stimuli, Spontaneous respirations absent, Breath sounds absent, Carotid pulse absent, Heart sounds absent, Pupillary light reflex absent, Corneal blink reflex absent, and asystole on monitor.     Medical Examiner notification criteria:  NONE APPLICABLE   Medical Examiner's office notified?:  No, does not meet ME notification criteria       Family Notification  Was the family notified?: Yes  Date Notified: 24  Time Notified:   Notified by: TAMMIE Morel  Name of Family Notified of Death: Imer Quintero   Relationship to Patient: Son  Family Notification Route: At bedside  Was the family told to contact a  home?: Yes  Name of  Home:: undecided    Autopsy Options:  Post-mortem examination declined by next of kin    Primary Service Attending Physician notified?:  yes - Attending:  Osiris Brantley DO

## 2024-09-03 NOTE — PROGRESS NOTES
OP.CM SW received ADT alert regarding patient. Lauren is . She presented to the ED on  for SOB, chest pain. That resolved but then she had hypoxic respiratory and required BiPAP. Lauren was a DNR.  She also had severe sepsis. Patient  on  at 9:52 pm. SW CM removed from care team and SW episode resolved.

## 2024-09-03 NOTE — PROGRESS NOTES
ADT Admission hospital notification received.  Chart review completed.  Patient .   Complex episode closed and removed self from care team.

## 2024-09-07 LAB
BACTERIA BLD CULT: NORMAL
BACTERIA BLD CULT: NORMAL

## (undated) DEVICE — CATH DIAG 5FR IMPULSE 100CM FR4

## (undated) DEVICE — CATH DIAG 5FR IMPULSE 100CM FL3.5